# Patient Record
Sex: MALE | Race: BLACK OR AFRICAN AMERICAN | NOT HISPANIC OR LATINO | Employment: OTHER | URBAN - METROPOLITAN AREA
[De-identification: names, ages, dates, MRNs, and addresses within clinical notes are randomized per-mention and may not be internally consistent; named-entity substitution may affect disease eponyms.]

---

## 2017-03-09 ENCOUNTER — HOSPITAL ENCOUNTER (OUTPATIENT)
Facility: HOSPITAL | Age: 67
LOS: 1 days | Discharge: HOME | End: 2017-03-10
Attending: EMERGENCY MEDICINE | Admitting: HOSPITALIST
Payer: MEDICARE

## 2017-03-09 DIAGNOSIS — I25.10 CORONARY ARTERY DISEASE DUE TO LIPID RICH PLAQUE: Chronic | ICD-10-CM

## 2017-03-09 DIAGNOSIS — I10 ESSENTIAL HYPERTENSION: Chronic | ICD-10-CM

## 2017-03-09 DIAGNOSIS — R07.9 ACUTE CHEST PAIN: ICD-10-CM

## 2017-03-09 DIAGNOSIS — R07.9 CHEST PAIN: ICD-10-CM

## 2017-03-09 DIAGNOSIS — E78.5 HYPERLIPIDEMIA, UNSPECIFIED HYPERLIPIDEMIA TYPE: Chronic | ICD-10-CM

## 2017-03-09 DIAGNOSIS — R94.31 ABNORMAL EKG: ICD-10-CM

## 2017-03-09 DIAGNOSIS — D63.8 ANEMIA OF CHRONIC DISEASE: Chronic | ICD-10-CM

## 2017-03-09 DIAGNOSIS — K21.9 GASTROESOPHAGEAL REFLUX DISEASE, ESOPHAGITIS PRESENCE NOT SPECIFIED: Chronic | ICD-10-CM

## 2017-03-09 DIAGNOSIS — I25.83 CORONARY ARTERY DISEASE DUE TO LIPID RICH PLAQUE: Chronic | ICD-10-CM

## 2017-03-09 DIAGNOSIS — N17.9 ACUTE RENAL FAILURE, UNSPECIFIED ACUTE RENAL FAILURE TYPE: Primary | ICD-10-CM

## 2017-03-09 LAB
ALBUMIN SERPL BCP-MCNC: 3.3 G/DL
ALP SERPL-CCNC: 84 U/L
ALT SERPL W/O P-5'-P-CCNC: 20 U/L
ANION GAP SERPL CALC-SCNC: 10 MMOL/L
AST SERPL-CCNC: 13 U/L
BASOPHILS # BLD AUTO: 0.03 K/UL
BASOPHILS NFR BLD: 0.5 %
BILIRUB SERPL-MCNC: 0.4 MG/DL
BNP SERPL-MCNC: 103 PG/ML
BUN SERPL-MCNC: 19 MG/DL
CALCIUM SERPL-MCNC: 9.4 MG/DL
CHLORIDE SERPL-SCNC: 97 MMOL/L
CO2 SERPL-SCNC: 25 MMOL/L
CREAT SERPL-MCNC: 1.5 MG/DL
DIFFERENTIAL METHOD: ABNORMAL
EOSINOPHIL # BLD AUTO: 0.2 K/UL
EOSINOPHIL NFR BLD: 3.2 %
ERYTHROCYTE [DISTWIDTH] IN BLOOD BY AUTOMATED COUNT: 12.6 %
EST. GFR  (AFRICAN AMERICAN): 55 ML/MIN/1.73 M^2
EST. GFR  (NON AFRICAN AMERICAN): 48 ML/MIN/1.73 M^2
GLUCOSE SERPL-MCNC: 438 MG/DL
HCT VFR BLD AUTO: 32.8 %
HGB BLD-MCNC: 12 G/DL
INR PPP: 1
LYMPHOCYTES # BLD AUTO: 1.4 K/UL
LYMPHOCYTES NFR BLD: 21.4 %
MCH RBC QN AUTO: 29.1 PG
MCHC RBC AUTO-ENTMCNC: 36.6 %
MCV RBC AUTO: 79 FL
MONOCYTES # BLD AUTO: 0.6 K/UL
MONOCYTES NFR BLD: 8.4 %
NEUTROPHILS # BLD AUTO: 4.4 K/UL
NEUTROPHILS NFR BLD: 67.1 %
PLATELET # BLD AUTO: 183 K/UL
PMV BLD AUTO: 9.3 FL
POTASSIUM SERPL-SCNC: 3.5 MMOL/L
PROT SERPL-MCNC: 7.2 G/DL
PROTHROMBIN TIME: 10.1 SEC
RBC # BLD AUTO: 4.13 M/UL
SODIUM SERPL-SCNC: 132 MMOL/L
TROPONIN I SERPL DL<=0.01 NG/ML-MCNC: 0.02 NG/ML
WBC # BLD AUTO: 6.64 K/UL

## 2017-03-09 PROCEDURE — 93005 ELECTROCARDIOGRAM TRACING: CPT

## 2017-03-09 PROCEDURE — 80053 COMPREHEN METABOLIC PANEL: CPT

## 2017-03-09 PROCEDURE — 25000003 PHARM REV CODE 250: Performed by: EMERGENCY MEDICINE

## 2017-03-09 PROCEDURE — 84484 ASSAY OF TROPONIN QUANT: CPT

## 2017-03-09 PROCEDURE — G0378 HOSPITAL OBSERVATION PER HR: HCPCS

## 2017-03-09 PROCEDURE — 83880 ASSAY OF NATRIURETIC PEPTIDE: CPT

## 2017-03-09 PROCEDURE — 96361 HYDRATE IV INFUSION ADD-ON: CPT

## 2017-03-09 PROCEDURE — 99285 EMERGENCY DEPT VISIT HI MDM: CPT | Mod: 25

## 2017-03-09 PROCEDURE — 85610 PROTHROMBIN TIME: CPT

## 2017-03-09 PROCEDURE — 85025 COMPLETE CBC W/AUTO DIFF WBC: CPT

## 2017-03-09 PROCEDURE — 96360 HYDRATION IV INFUSION INIT: CPT

## 2017-03-09 PROCEDURE — 82962 GLUCOSE BLOOD TEST: CPT

## 2017-03-09 RX ORDER — ACETAMINOPHEN 500 MG
1000 TABLET ORAL
Status: COMPLETED | OUTPATIENT
Start: 2017-03-09 | End: 2017-03-09

## 2017-03-09 RX ADMIN — ACETAMINOPHEN 1000 MG: 500 TABLET ORAL at 09:03

## 2017-03-09 RX ADMIN — SODIUM CHLORIDE 1000 ML: 0.9 INJECTION, SOLUTION INTRAVENOUS at 10:03

## 2017-03-09 NOTE — IP AVS SNAPSHOT
[place] [address] [place] [address]  Phone: [phone]           Patient Discharge Instructions     Our goal is to set you up for success. This packet includes information on your condition, medications, and your home care. It will help you to care for yourself so you don't get sicker and need to go back to the hospital.     Please ask your nurse if you have any questions.        There are many details to remember when preparing to leave the hospital. Here is what you will need to do:    1. Take your medicine. If you are prescribed medications, review your Medication List in the following pages. You may have new medications to  at the pharmacy and others that you'll need to stop taking. Review the instructions for how and when to take your medications. Talk with your doctor or nurses if you are unsure of what to do.     2. Go to your follow-up appointments. Specific follow-up information is listed in the following pages. Your may be contacted by a transition nurse or clinical provider about future appointments. Be sure we have all of the phone numbers to reach you, if needed. Please contact your provider's office if you are unable to make an appointment.     3. Watch for warning signs. Your doctor or nurse will give you detailed warning signs to watch for and when to call for assistance. These instructions may also include educational information about your condition. If you experience any of warning signs to your health, call your doctor.               [facility]  Unless otherwise directed by your provider, please contact [facility], our nurse care line that is available for 24/7 assistance.     [phone] (toll-free)    Registered nurses in the [facility] provide clinical advisement, health education, appointment booking, and other advisory services.                    ** Verify the list of medication(s) below is accurate and up to date.  Carry this with you in case of emergency. If your medications have changed, please notify your healthcare provider.             Medication List      CONTINUE taking these medications        Additional Info                      amlodipine 10 MG tablet   Commonly known as:  NORVASC   Refills:  0   Dose:  10 mg    Last time this was given:  10 mg on 3/10/2017  9:46 AM   Instructions:  Take 10 mg by mouth once daily.     Begin Date    AM    Noon    PM    Bedtime       aspirin 81 MG EC tablet   Commonly known as:  ECOTRIN   Refills:  0   Dose:  81 mg    Last time this was given:  81 mg on 3/10/2017  9:47 AM   Instructions:  Take 1 tablet (81 mg total) by mouth once daily.     Begin Date    AM    Noon    PM    Bedtime       atorvastatin 80 MG tablet   Commonly known as:  LIPITOR   Refills:  0   Dose:  80 mg    Instructions:  Take 80 mg by mouth every evening.     Begin Date    AM    Noon    PM    Bedtime       carvedilol 12.5 MG tablet   Commonly known as:  COREG   Quantity:  60 tablet   Refills:  0   Dose:  12.5 mg    Last time this was given:  12.5 mg on 3/10/2017  9:47 AM   Instructions:  Take 1 tablet (12.5 mg total) by mouth 2 (two) times daily.     Begin Date    AM    Noon    PM    Bedtime       isosorbide-hydrALAZINE 20-37.5 mg 20-37.5 mg Tab   Commonly known as:  BIDIL   Quantity:  60 tablet   Refills:  0   Dose:  1 tablet    Last time this was given:  1 tablet on 3/10/2017  9:46 AM   Instructions:  Take 1 tablet by mouth 2 (two) times daily.     Begin Date    AM    Noon    PM    Bedtime       lisinopril 40 MG tablet   Commonly known as:  PRINIVIL,ZESTRIL   Refills:  0   Dose:  40 mg    Last time this was given:  40 mg on 3/10/2017  9:47 AM   Instructions:  Take 40 mg by mouth once daily.     Begin Date    AM    Noon    PM    Bedtime       metformin 1000 MG tablet   Commonly known as:  GLUCOPHAGE   Refills:  0   Dose:  1000 mg    Instructions:  Take 1,000 mg by mouth 2 (two) times daily with meals.     Begin Date  "   AM    Noon    PM    Bedtime       pantoprazole 40 MG tablet   Commonly known as:  PROTONIX   Quantity:  60 tablet   Refills:  1   Dose:  40 mg    Last time this was given:  40 mg on 3/10/2017  9:47 AM   Instructions:  Take 1 tablet (40 mg total) by mouth 2 (two) times daily.     Begin Date    AM    Noon    PM    Bedtime                  Please bring to all follow up appointments:    1. A copy of your discharge instructions.  2. All medicines you are currently taking in their original bottles.  3. Identification and insurance card.    Please arrive 15 minutes ahead of scheduled appointment time.    Please call 24 hours in advance if you must reschedule your appointment and/or time.        Follow-up Information     Follow up with [facility] On 3/14/2017.    Why:  Appointment scheduled for Tuesday March 14th at 10:50am.    Contact information:    [place] [address]  [phone]          Discharge Instructions     Future Orders    Activity as tolerated     Diet general     Questions:    Total calories:      Fat restriction, if any:      Protein restriction, if any:      Na restriction, if any:      Fluid restriction:      Additional restrictions:  Diabetic 1800    Cardiac (Low Na/Chol)        Primary Diagnosis     Your primary diagnosis was:  Acute Chest Pain      Admission Information     Date & Time Provider Department CSN    3/9/2017  9:18 PM Raoul Morse MD [facility] [id number]      Care Providers     Provider Role Specialty Primary office phone    Raoul Morse MD Attending Provider Hospitalist [phone]    Indra Foote MD Consulting Physician  Cardiology [phone]      Your Vitals Were     BP Pulse Temp Resp Height Weight    166/89 (BP Location: Right arm, Patient Position: Lying, BP Method: Automatic) 65 97.9 °F (36.6 °C) (Oral) 18 5' 11" (1.803 m) 96.3 kg (212 lb 4.9 oz)    SpO2 BMI             100% 29.61 kg/m2         Recent Lab " Values        3/7/2016                           5:37 AM           A1C 11.4 (H)                       Allergies as of 3/10/2017     No Known Allergies      Advance Directives     An advance directive is a document which, in the event you are no longer able to make decisions for yourself, tells your healthcare team what kind of treatment you do or do not want to receive, or who you would like to make those decisions for you.  If you do not currently have an advance directive, [facility] encourages you to create one.  For more information call:  [phone]), [phone] ([phone]),  or log on to [email or web address].        Language Assistance Services     ATTENTION: Language assistance services are available, free of charge. Please call [phone].      ATENCIÓN: Si habla español, tiene a schmitt disposición servicios gratuitos de asistencia lingüística. Llame al [phone].     CHÚ Ý: N?u b?n nói Ti?ng Vi?t, có các d?ch v? h? tr? ngôn ng? mi?n phí dành cho b?n. G?i s? [phone].        Diabetes Discharge Instructions                                   [facility] Sign[facility]     Activating your [facility] account is as easy as 1-2-3!     1) Visit [email or web address], select Sign Up Now, enter this activation code and your date of birth, then select Next.  [email or web address]  Expires: 4/24/2017  5:22 PM      2) Create a username and password to use when you visit [facility] in the future and select a security question in case you lose your password and select Next.    3) Enter your e-mail address and click Sign Up!    Additional Information  If you have questions, please e-mail [email or web address] or call [phone] to talk to our [facility] staff. Remember, [facility] is NOT to be used for urgent needs. For medical emergencies, dial 911.          [facility] complies with applicable Federal civil rights laws and does not discriminate on the basis of race, color, national  origin, age, disability, or sex.

## 2017-03-10 VITALS
HEIGHT: 71 IN | BODY MASS INDEX: 29.72 KG/M2 | OXYGEN SATURATION: 100 % | SYSTOLIC BLOOD PRESSURE: 160 MMHG | TEMPERATURE: 98 F | RESPIRATION RATE: 18 BRPM | DIASTOLIC BLOOD PRESSURE: 88 MMHG | HEART RATE: 65 BPM | WEIGHT: 212.31 LBS

## 2017-03-10 PROBLEM — N17.9 ACUTE RENAL FAILURE: Status: ACTIVE | Noted: 2017-03-10

## 2017-03-10 PROBLEM — D63.8 ANEMIA OF CHRONIC DISEASE: Chronic | Status: ACTIVE | Noted: 2017-03-10

## 2017-03-10 PROBLEM — E44.1 MILD PROTEIN MALNUTRITION: Chronic | Status: ACTIVE | Noted: 2017-03-10

## 2017-03-10 PROBLEM — E78.5 HYPERLIPIDEMIA: Chronic | Status: ACTIVE | Noted: 2017-03-10

## 2017-03-10 PROBLEM — K21.9 GERD (GASTROESOPHAGEAL REFLUX DISEASE): Chronic | Status: ACTIVE | Noted: 2017-03-10

## 2017-03-10 PROBLEM — I10 ESSENTIAL HYPERTENSION: Chronic | Status: ACTIVE | Noted: 2017-03-10

## 2017-03-10 LAB
ANION GAP SERPL CALC-SCNC: 11 MMOL/L
AORTIC VALVE REGURGITATION: ABNORMAL
AORTIC VALVE STENOSIS: ABNORMAL
BUN SERPL-MCNC: 16 MG/DL
CALCIUM SERPL-MCNC: 9 MG/DL
CHLORIDE SERPL-SCNC: 102 MMOL/L
CHOLEST/HDLC SERPL: 6.3 {RATIO}
CO2 SERPL-SCNC: 21 MMOL/L
CREAT SERPL-MCNC: 1.4 MG/DL
DIASTOLIC DYSFUNCTION: NO
DIASTOLIC DYSFUNCTION: YES
EST. GFR  (AFRICAN AMERICAN): >60 ML/MIN/1.73 M^2
EST. GFR  (NON AFRICAN AMERICAN): 52 ML/MIN/1.73 M^2
ESTIMATED PA SYSTOLIC PRESSURE: 15.53
GLUCOSE SERPL-MCNC: 410 MG/DL
HDL/CHOLESTEROL RATIO: 15.9 %
HDLC SERPL-MCNC: 138 MG/DL
HDLC SERPL-MCNC: 22 MG/DL
LDLC SERPL CALC-MCNC: ABNORMAL MG/DL
NONHDLC SERPL-MCNC: 116 MG/DL
POCT GLUCOSE: 312 MG/DL (ref 70–110)
POCT GLUCOSE: 320 MG/DL (ref 70–110)
POCT GLUCOSE: 348 MG/DL (ref 70–110)
POCT GLUCOSE: 349 MG/DL (ref 70–110)
POCT GLUCOSE: 350 MG/DL (ref 70–110)
POTASSIUM SERPL-SCNC: 4.2 MMOL/L
RETIRED EF AND QEF - SEE NOTES: 55 (ref 55–65)
SODIUM SERPL-SCNC: 134 MMOL/L
TRIGL SERPL-MCNC: 410 MG/DL
TROPONIN I SERPL DL<=0.01 NG/ML-MCNC: 0.01 NG/ML
TROPONIN I SERPL DL<=0.01 NG/ML-MCNC: <0.006 NG/ML

## 2017-03-10 PROCEDURE — 96368 THER/DIAG CONCURRENT INF: CPT

## 2017-03-10 PROCEDURE — 93306 TTE W/DOPPLER COMPLETE: CPT | Mod: 26,,, | Performed by: INTERNAL MEDICINE

## 2017-03-10 PROCEDURE — 27000221 HC OXYGEN, UP TO 24 HOURS

## 2017-03-10 PROCEDURE — 94761 N-INVAS EAR/PLS OXIMETRY MLT: CPT

## 2017-03-10 PROCEDURE — G0378 HOSPITAL OBSERVATION PER HR: HCPCS

## 2017-03-10 PROCEDURE — 93018 CV STRESS TEST I&R ONLY: CPT | Mod: ,,, | Performed by: INTERNAL MEDICINE

## 2017-03-10 PROCEDURE — 80048 BASIC METABOLIC PNL TOTAL CA: CPT

## 2017-03-10 PROCEDURE — 80061 LIPID PANEL: CPT

## 2017-03-10 PROCEDURE — 93306 TTE W/DOPPLER COMPLETE: CPT

## 2017-03-10 PROCEDURE — 84484 ASSAY OF TROPONIN QUANT: CPT

## 2017-03-10 PROCEDURE — 93005 ELECTROCARDIOGRAM TRACING: CPT

## 2017-03-10 PROCEDURE — 25000003 PHARM REV CODE 250: Performed by: EMERGENCY MEDICINE

## 2017-03-10 PROCEDURE — 93017 CV STRESS TEST TRACING ONLY: CPT

## 2017-03-10 PROCEDURE — 63600175 PHARM REV CODE 636 W HCPCS

## 2017-03-10 PROCEDURE — 96360 HYDRATION IV INFUSION INIT: CPT

## 2017-03-10 PROCEDURE — 36415 COLL VENOUS BLD VENIPUNCTURE: CPT

## 2017-03-10 PROCEDURE — 93016 CV STRESS TEST SUPVJ ONLY: CPT | Mod: ,,, | Performed by: INTERNAL MEDICINE

## 2017-03-10 PROCEDURE — 99219 PR INITIAL OBSERVATION CARE,LEVL II: CPT | Mod: ,,, | Performed by: INTERNAL MEDICINE

## 2017-03-10 PROCEDURE — 78452 HT MUSCLE IMAGE SPECT MULT: CPT | Mod: 26,,, | Performed by: INTERNAL MEDICINE

## 2017-03-10 PROCEDURE — 96361 HYDRATE IV INFUSION ADD-ON: CPT

## 2017-03-10 PROCEDURE — 63600175 PHARM REV CODE 636 W HCPCS: Performed by: INTERNAL MEDICINE

## 2017-03-10 PROCEDURE — 25000003 PHARM REV CODE 250: Performed by: INTERNAL MEDICINE

## 2017-03-10 RX ORDER — GLUCAGON 1 MG
1 KIT INJECTION
Status: DISCONTINUED | OUTPATIENT
Start: 2017-03-10 | End: 2017-03-10 | Stop reason: HOSPADM

## 2017-03-10 RX ORDER — AMLODIPINE BESYLATE 5 MG/1
10 TABLET ORAL DAILY
Status: DISCONTINUED | OUTPATIENT
Start: 2017-03-10 | End: 2017-03-10 | Stop reason: HOSPADM

## 2017-03-10 RX ORDER — ISOSORBIDE DINITRATE AND HYDRALAZINE HYDROCHLORIDE 37.5; 2 MG/1; MG/1
1 TABLET ORAL 2 TIMES DAILY
Status: DISCONTINUED | OUTPATIENT
Start: 2017-03-10 | End: 2017-03-10 | Stop reason: HOSPADM

## 2017-03-10 RX ORDER — CLONIDINE HYDROCHLORIDE 0.1 MG/1
0.1 TABLET ORAL 3 TIMES DAILY PRN
Status: DISCONTINUED | OUTPATIENT
Start: 2017-03-10 | End: 2017-03-10 | Stop reason: HOSPADM

## 2017-03-10 RX ORDER — ASPIRIN 81 MG/1
81 TABLET ORAL DAILY
Status: DISCONTINUED | OUTPATIENT
Start: 2017-03-10 | End: 2017-03-10 | Stop reason: HOSPADM

## 2017-03-10 RX ORDER — ONDANSETRON 2 MG/ML
4 INJECTION INTRAMUSCULAR; INTRAVENOUS EVERY 12 HOURS PRN
Status: DISCONTINUED | OUTPATIENT
Start: 2017-03-10 | End: 2017-03-10

## 2017-03-10 RX ORDER — AMOXICILLIN 250 MG
1 CAPSULE ORAL 2 TIMES DAILY
Status: DISCONTINUED | OUTPATIENT
Start: 2017-03-10 | End: 2017-03-10

## 2017-03-10 RX ORDER — LISINOPRIL 20 MG/1
40 TABLET ORAL DAILY
Status: DISCONTINUED | OUTPATIENT
Start: 2017-03-10 | End: 2017-03-10 | Stop reason: HOSPADM

## 2017-03-10 RX ORDER — IBUPROFEN 200 MG
16 TABLET ORAL
Status: DISCONTINUED | OUTPATIENT
Start: 2017-03-10 | End: 2017-03-10 | Stop reason: HOSPADM

## 2017-03-10 RX ORDER — ONDANSETRON 2 MG/ML
8 INJECTION INTRAMUSCULAR; INTRAVENOUS EVERY 8 HOURS PRN
Status: DISCONTINUED | OUTPATIENT
Start: 2017-03-10 | End: 2017-03-10 | Stop reason: HOSPADM

## 2017-03-10 RX ORDER — NITROGLYCERIN 0.4 MG/1
0.4 TABLET SUBLINGUAL EVERY 5 MIN PRN
Status: DISCONTINUED | OUTPATIENT
Start: 2017-03-10 | End: 2017-03-10 | Stop reason: HOSPADM

## 2017-03-10 RX ORDER — REGADENOSON 0.08 MG/ML
INJECTION, SOLUTION INTRAVENOUS
Status: DISCONTINUED
Start: 2017-03-10 | End: 2017-03-10 | Stop reason: HOSPADM

## 2017-03-10 RX ORDER — IBUPROFEN 200 MG
24 TABLET ORAL
Status: DISCONTINUED | OUTPATIENT
Start: 2017-03-10 | End: 2017-03-10 | Stop reason: HOSPADM

## 2017-03-10 RX ORDER — ACETAMINOPHEN 325 MG/1
650 TABLET ORAL EVERY 6 HOURS PRN
Status: DISCONTINUED | OUTPATIENT
Start: 2017-03-10 | End: 2017-03-10

## 2017-03-10 RX ORDER — ACETAMINOPHEN 500 MG
500 TABLET ORAL EVERY 6 HOURS PRN
Status: DISCONTINUED | OUTPATIENT
Start: 2017-03-10 | End: 2017-03-10 | Stop reason: HOSPADM

## 2017-03-10 RX ORDER — ENOXAPARIN SODIUM 100 MG/ML
40 INJECTION SUBCUTANEOUS EVERY 24 HOURS
Status: DISCONTINUED | OUTPATIENT
Start: 2017-03-10 | End: 2017-03-10 | Stop reason: HOSPADM

## 2017-03-10 RX ORDER — INSULIN ASPART 100 [IU]/ML
0-5 INJECTION, SOLUTION INTRAVENOUS; SUBCUTANEOUS
Status: DISCONTINUED | OUTPATIENT
Start: 2017-03-10 | End: 2017-03-10

## 2017-03-10 RX ORDER — ATORVASTATIN CALCIUM 40 MG/1
80 TABLET, FILM COATED ORAL NIGHTLY
Status: DISCONTINUED | OUTPATIENT
Start: 2017-03-10 | End: 2017-03-10 | Stop reason: HOSPADM

## 2017-03-10 RX ORDER — SODIUM CHLORIDE 9 MG/ML
INJECTION, SOLUTION INTRAVENOUS CONTINUOUS
Status: DISCONTINUED | OUTPATIENT
Start: 2017-03-10 | End: 2017-03-10 | Stop reason: HOSPADM

## 2017-03-10 RX ORDER — CARVEDILOL 6.25 MG/1
12.5 TABLET ORAL 2 TIMES DAILY
Status: DISCONTINUED | OUTPATIENT
Start: 2017-03-10 | End: 2017-03-10 | Stop reason: HOSPADM

## 2017-03-10 RX ORDER — INSULIN ASPART 100 [IU]/ML
0-5 INJECTION, SOLUTION INTRAVENOUS; SUBCUTANEOUS
Status: DISCONTINUED | OUTPATIENT
Start: 2017-03-10 | End: 2017-03-10 | Stop reason: HOSPADM

## 2017-03-10 RX ORDER — RAMELTEON 8 MG/1
8 TABLET ORAL NIGHTLY PRN
Status: DISCONTINUED | OUTPATIENT
Start: 2017-03-10 | End: 2017-03-10 | Stop reason: HOSPADM

## 2017-03-10 RX ORDER — PANTOPRAZOLE SODIUM 40 MG/1
40 TABLET, DELAYED RELEASE ORAL 2 TIMES DAILY
Status: DISCONTINUED | OUTPATIENT
Start: 2017-03-10 | End: 2017-03-10 | Stop reason: HOSPADM

## 2017-03-10 RX ORDER — MORPHINE SULFATE 10 MG/ML
4 INJECTION INTRAMUSCULAR; INTRAVENOUS; SUBCUTANEOUS EVERY 4 HOURS PRN
Status: DISCONTINUED | OUTPATIENT
Start: 2017-03-10 | End: 2017-03-10 | Stop reason: HOSPADM

## 2017-03-10 RX ADMIN — INSULIN ASPART 5 UNITS: 100 INJECTION, SOLUTION INTRAVENOUS; SUBCUTANEOUS at 05:03

## 2017-03-10 RX ADMIN — SODIUM CHLORIDE: 0.9 INJECTION, SOLUTION INTRAVENOUS at 06:03

## 2017-03-10 RX ADMIN — ASPIRIN 81 MG: 81 TABLET, COATED ORAL at 09:03

## 2017-03-10 RX ADMIN — CLONIDINE HYDROCHLORIDE 0.1 MG: 0.1 TABLET ORAL at 06:03

## 2017-03-10 RX ADMIN — PANTOPRAZOLE SODIUM 40 MG: 40 TABLET, DELAYED RELEASE ORAL at 09:03

## 2017-03-10 RX ADMIN — LISINOPRIL 40 MG: 20 TABLET ORAL at 09:03

## 2017-03-10 RX ADMIN — HYDRALAZINE HYDROCHLORIDE AND ISOSORBIDE DINITRATE 1 TABLET: 37.5; 2 TABLET, FILM COATED ORAL at 09:03

## 2017-03-10 RX ADMIN — CARVEDILOL 12.5 MG: 6.25 TABLET, FILM COATED ORAL at 09:03

## 2017-03-10 RX ADMIN — AMLODIPINE BESYLATE 10 MG: 5 TABLET ORAL at 09:03

## 2017-03-10 RX ADMIN — INSULIN ASPART 2 UNITS: 100 INJECTION, SOLUTION INTRAVENOUS; SUBCUTANEOUS at 03:03

## 2017-03-10 NOTE — ASSESSMENT & PLAN NOTE
Poorly-controlled; will continue patient's home regimen of atorvastatin.  Will repeat the lipid panel.

## 2017-03-10 NOTE — ED PROVIDER NOTES
Encounter Date: 3/9/2017    SCRIBE #1 NOTE: I, Connie Leos, am scribing for, and in the presence of,  Brian Hernandez MD. I have scribed the following portions of the note - Other sections scribed: HPI, ROS.       History   No chief complaint on file.    Review of patient's allergies indicates:  No Known Allergies  HPI Comments: CC: Chest Pain    HPI: 66 y.o. M with DM, HTN, hyperlipemia, arthritis, and CAD presents to the ED via EMS c/o intermittent, non-radiating L-sided chest pain with associated SOB, abdominal pain, and headaches x 3 days with worsening pain this evening. Symptoms are acute onset and severe (9/10). EMS administered 3 doses of nitro SL and aspirin 325 mg en route with no relief. Pt denies fever, chills, diaphoresis, nausea, vomiting, extremity pain, and any other associated symptoms. No alleviating factors.    The history is provided by the patient and the EMS personnel. No  was used.     Past Medical History:   Diagnosis Date    Arthritis     Coronary artery disease     Diabetes mellitus     Hyperlipemia     Hypertension      Past Surgical History:   Procedure Laterality Date    EYE SURGERY      VASCULAR SURGERY       Family History   Problem Relation Age of Onset    Hypertension Mother     Diabetes Mother     Diabetes Father     Hypertension Father     Diabetes Sister     Hypertension Sister      Social History   Substance Use Topics    Smoking status: Never Smoker    Smokeless tobacco: None    Alcohol use 3.6 oz/week     6 Cans of beer per week      Comment: states drinks a 6 pack of beer every other day     Review of Systems   Constitutional: Negative for chills, diaphoresis and fever.   HENT: Negative for ear pain and sore throat.    Eyes: Negative for photophobia and visual disturbance.   Respiratory: Positive for shortness of breath. Negative for cough.    Cardiovascular: Positive for chest pain (L-sided).   Gastrointestinal: Positive for abdominal  pain. Negative for diarrhea, nausea and vomiting.   Genitourinary: Negative for dysuria.   Musculoskeletal: Negative for back pain.        (-) extremity pain   Skin: Negative for rash.   Neurological: Positive for headaches.       Physical Exam   Initial Vitals   BP Pulse Resp Temp SpO2   03/09/17 2121 03/09/17 2121 -- -- 03/09/17 2121   146/75 78   96 %     Physical Exam    Nursing note and vitals reviewed.  Constitutional: He appears well-developed and well-nourished. He is not diaphoretic. No distress.   HENT:   Head: Normocephalic and atraumatic.   Right Ear: External ear normal.   Left Ear: External ear normal.   Nose: Nose normal.   Mouth/Throat: Oropharynx is clear and moist.   Eyes: Conjunctivae and EOM are normal. Pupils are equal, round, and reactive to light. Right eye exhibits no discharge. Left eye exhibits no discharge. No scleral icterus.   Neck: Normal range of motion. Neck supple. No JVD present.   Cardiovascular: Normal rate, regular rhythm, normal heart sounds and intact distal pulses. Exam reveals no gallop and no friction rub.    No murmur heard.  Pulmonary/Chest: Breath sounds normal. No stridor. No respiratory distress. He has no wheezes. He has no rhonchi. He has no rales. He exhibits no tenderness.   Abdominal: He exhibits no distension and no mass. There is no tenderness. There is no rebound and no guarding.   Musculoskeletal: Normal range of motion. He exhibits no edema or tenderness.   Neurological: He is alert and oriented to person, place, and time. He has normal strength. No cranial nerve deficit or sensory deficit.   Skin: Skin is warm and dry. No rash noted. No erythema. No pallor.   Psychiatric: He has a normal mood and affect. His behavior is normal. Judgment and thought content normal.         ED Course   Procedures  Labs Reviewed - No data to display  EKG Readings: (Independently Interpreted)   Initial Reading: No STEMI.   EKG reviewed and interpreted by me shows sinus rhythm at  a rate of 78.  There is a left bundle branch block.  There is first-degree AV block.  There is a normal QT interval.  There is a left axis deviation.  There is poor R-wave progression.  There are T-wave inversions in the lateral and inferior leads.  Findings are similar morphology to EKG from March 6, 2016.       X-Rays:   Independently Interpreted Readings:   Other Readings:  Chest x-ray reviewed and interpreted by me shows no evidence of pulmonary edema, pneumothorax, or consolidations/ infiltrates.    Medical Decision Making:   ED Management:  This is the emergent evaluation of a 66-year-old male presents the emergency department complaining of chest pain that has been intermittent for the past 3 days but suddenly became worse this evening.Differential diagnosis at the time of initial evaluation included, but was not limited to:  acute myocardial infarction, acute coronary syndrome, pericarditis, myocarditis, pneumonia, pneumothorax, gastroesophageal reflux disease, pleurisy, costochondritis.  I consider but doubt pulmonary embolus and aortic dissection.  I reviewed this patient's medical records.  History of coronary artery disease, diabetes.  Patient denies any tobacco use.    Patient has findings by history that are concerning for acute coronary syndrome.  He has multiple risk factors.  EKG is abnormal but unchanged from prior.  This patient needs admission to telemetry for further evaluation and management by cardiology.  Serial troponins.  Patient's chest pain-free at this time.  I believe he is safe and stable for admission.  Case discussed with the admitting hospitalist, Dr. Flores, who agrees with plan.            Scribe Attestation:   Scribe #1: I performed the above scribed service and the documentation accurately describes the services I performed. I attest to the accuracy of the note.    Attending Attestation:           Physician Attestation for Scribe:  Physician Attestation Statement for Scribe #1:  I, Brian Hernandez MD, reviewed documentation, as scribed by Connie Leos in my presence, and it is both accurate and complete.                 ED Course     Clinical Impression:   The encounter diagnosis was Chest pain.          Brian Hernandez Jr., MD  03/10/17 0144

## 2017-03-10 NOTE — ASSESSMENT & PLAN NOTE
Poorly-controlled on a home regimen of metformin; will provide basal insulin along with insulin sliding scale.

## 2017-03-10 NOTE — PROGRESS NOTES
Follow-up Information     Follow up with [facility] On 3/14/2017.    Why:  Appointment scheduled for Tuesday March 14th at 10:50am.    Contact information:    [place] [id number]  [phone]        Thank you for choosing [facility] for your care. Within 48-72 hours after leaving the hospital you will receive a call from [facility] Nurses following up to see how you are doing. The team will ask you a few questions and the call will last approximately 20 minutes.     Please answer any calls you may receive from [facility] we want to continue to support you as you manage your healthcare needs. [facility] happy to have the opportunity to serve you.     [facility] Nurse Care Line - 24/7 Assistance  Registered [facility] nurses can provide appointment booking, health education, clinical advisement, and other advisory services.   Call for this free service at [phone].              Sincerely,   Your [facility],   Corine Goldberg,JOYCE/[place]  [phone]

## 2017-03-10 NOTE — ASSESSMENT & PLAN NOTE
Patient's blood pressure is poorly-controlled; will continue home regimen of amlodipine, carvedilol, hydralazine, isosorbide, and lisinopril, and provide as-needed clonidine.

## 2017-03-10 NOTE — DISCHARGE SUMMARY
[facility] Hospital Medicine  Discharge Summary      Patient Name: Chato Bowie  MRN: [id number]  Admission Date: 3/9/2017  Hospital Length of Stay: 1 days  Discharge Date and Time:  03/10/2017 4:53 PM  Attending Physician: Raoul Morse MD   Discharging Provider: Radha Ye PA-C  Primary Care Provider: [facility]      HPI:   Mr. Chato Bowie is a 66 y.o. male with essential hypertension, hyperlipidemia (LDL inval. Feb 2010), type 2 diabetes mellitus (HbA1c 11.4% Mar 2016), anemia of chronic disease, mild protein malnutrition, and GERD who presents to [facility] ED with complaints of chest pain for three days.  The pain is left-sided with radiation to the left shoulder, is both heavy and stabbing in quality, and was 10/10 in severity.  The pain started at rest while he was watching television and was associated with nausea without vomiting, shortness of breath, and weakness, but not with diaphoresis, palpitations, fevers, chills, hemoptysis, nor any lower extremity pain or swelling.  He cannot recall any exacerbating factors but does say that the sublingual nitroglycerin he received helps with pain.  He reports that both his mother and father had heart attacks and diabetes, but he himself never had a heart attack.  He does say that he usually goes to [facility] for these pains, which has been ongoing for three years, and he has undergone coronary angiograms but never had a stent.      * No surgery found *      Indwelling Lines/Drains at time of discharge:   Lines/Drains/Airways          No matching active lines, drains, or airways        Hospital Course:   Placed in observation for ACS r/o. Patient with no chest pain by time of interview. CHARITY score of 3 for age, risk factors, and recent aspirin use.  Place in observation on telemetry. EKG and it reveals normal sinus rhythm with a LBBB that appears to be new; there are anterolateral T-wave inversions  which appears to be old. Chest X-ray is without any obvious chest pain mimickers. Administered supplemental oxygen and aspirin along with as-needed nitroglycerin and morphine.  Cardiology consulted with recommendations for stress and echo. Obtained serial cardiac markers all negative. Echo revealed EF of 55% and DD. Stress revealing abnormal perfusion with moderate fixed defect L ventricle wall but unchanged from previous in March 2016. Patient BP poorly controlled but reports had been out of home medications for about 1 week. Renal function improved with IVF hydration. Of note patient with elevated BG on presentation. On metformin at home but again states has been out for about a week. Concerned he may need addition of basal insulin, however, per patient when on metformin prior to running out BG running in one teens to 150. He states he will go to  refills right after discharge and keep BG log to bring to PCP appointment to determine need for further titration. He remained stable throughout stay and will discharge to home today.      Consults:   Consults         Status Ordering Provider     Inpatient consult to Cardiology  Once     Provider:  Indra Foote MD    Acknowledged BLAS PATHAK JR          Significant Diagnostic Studies: Labs:   CMP   Recent Labs  Lab 03/09/17 2138 03/10/17  1457   * 134*   K 3.5 4.2   CL 97 102   CO2 25 21*   * 410*   BUN 19 16   CREATININE 1.5* 1.4   CALCIUM 9.4 9.0   PROT 7.2  --    ALBUMIN 3.3*  --    BILITOT 0.4  --    ALKPHOS 84  --    AST 13  --    ALT 20  --    ANIONGAP 10 11   ESTGFRAFRICA 55* >60   EGFRNONAA 48* 52*   , CBC   Recent Labs  Lab 03/09/17 2138   WBC 6.64   HGB 12.0*   HCT 32.8*       and Troponin   Recent Labs  Lab 03/10/17  1457   TROPONINI <0.006     NST: ABNORMAL MYOCARDIAL PERFUSION  1. The perfusion scan is free of evidence for myocardial ischemia.   2. There is mild intensity fixed defect in the inferior wall of the  left ventricle, consistent with myocardial injury.   3. Resting wall motion is physiologic.   4. There is resting LV dysfunction with a reduced ejection fraction of 44 %.   5. The ventricular volumes are normal at rest and stress.   6. The extracardiac distribution of radioactivity is normal.   7. When compared to the previous study from 03/08/2016, no change    Cardiac Graphics: Echocardiogram:   2D echo with color flow doppler:   Results for orders placed or performed during the hospital encounter of 03/09/17   2D echo with color flow doppler   Result Value Ref Range    EF 55 55 - 65    Diastolic Dysfunction Yes (A)     Aortic Valve Regurgitation MILD     Aortic Valve Stenosis MILD (A)     Est. PA Systolic Pressure 15.53        Pending Diagnostic Studies:     Procedure Component Value Units Date/Time    EKG 12-LEAD [[id number]]     Order Status:  Sent Lab Status:  No result     EKG 12-LEAD [[id number]]     Order Status:  Sent Lab Status:  No result     EKG 12-LEAD [[id number]]     Order Status:  Sent Lab Status:  No result     NM Myocardial Perfusion Spect Multi Pharmacologic [[id number]] Resulted:  03/10/17 1054    Order Status:  Sent Lab Status:  In process Updated:  03/10/17 1339        Final Active Diagnoses:    Diagnosis Date Noted POA    PRINCIPAL PROBLEM:  Acute chest pain [R07.9] 03/08/2016 Yes    Acute renal failure [N17.9] 03/10/2017 Yes    Essential hypertension [I10] 03/10/2017 Yes     Chronic    Hyperlipidemia [E78.5] 03/10/2017 Yes     Chronic    Type 2 diabetes mellitus, uncontrolled [E11.65] 03/10/2017 Yes     Chronic    Anemia of chronic disease [D63.8] 03/10/2017 Yes     Chronic    Mild protein malnutrition [E44.1] 03/10/2017 Yes     Chronic    GERD (gastroesophageal reflux disease) [K21.9] 03/10/2017 Yes     Chronic      Problems Resolved During this Admission:    Diagnosis Date Noted Date Resolved POA      No new Assessment & Plan notes have been filed under this hospital service since the  last note was generated.  Service: Hospital Medicine      Discharged Condition: stable    Disposition:     Follow Up:  Follow-up Information     Follow up with [facility] On 3/14/2017.    Why:  Appointment scheduled for Tuesday March 14th at 10:50am.    Contact information:    [place] [id number]  [phone]          Patient Instructions:   No discharge procedures on file.  Medications:  Reconciled Home Medications:   Current Discharge Medication List      CONTINUE these medications which have NOT CHANGED    Details   amlodipine (NORVASC) 10 MG tablet Take 10 mg by mouth once daily.      aspirin (ECOTRIN) 81 MG EC tablet Take 1 tablet (81 mg total) by mouth once daily.  Refills: 0      atorvastatin (LIPITOR) 80 MG tablet Take 80 mg by mouth every evening.      carvedilol (COREG) 12.5 MG tablet Take 1 tablet (12.5 mg total) by mouth 2 (two) times daily.  Qty: 60 tablet, Refills: 0      isosorbide-hydrALAZINE 20-37.5 mg (BIDIL) 20-37.5 mg Tab Take 1 tablet by mouth 2 (two) times daily.  Qty: 60 tablet, Refills: 0      lisinopril (PRINIVIL,ZESTRIL) 40 MG tablet Take 40 mg by mouth once daily.      metformin (GLUCOPHAGE) 1000 MG tablet Take 1,000 mg by mouth 2 (two) times daily with meals.      pantoprazole (PROTONIX) 40 MG tablet Take 1 tablet (40 mg total) by mouth 2 (two) times daily.  Qty: 60 tablet, Refills: 1           Time spent on the discharge of patient: less than thirty minutes    Radha Ye PA-C  Department of Hospital Medicine  [facility]

## 2017-03-10 NOTE — PLAN OF CARE
03/10/17 1559   Discharge Assessment   Assessment Type Discharge Planning Assessment   Confirmed/corrected address and phone number on facesheet? Yes   Assessment information obtained from? Patient   Prior to hospitilization cognitive status: Alert/Oriented   Prior to hospitalization functional status: Independent   Current cognitive status: Alert/Oriented   Current Functional Status: Independent   Arrived From home or self-care   Lives With alone   Able to Return to Prior Arrangements yes   Is patient able to care for self after discharge? Yes   How many people do you have in your home that can help with your care after discharge? 1   Who are your caregiver(s) and their phone number(s)? Dulce cole [phone]   Patient's perception of discharge disposition home or selfcare   Readmission Within The Last 30 Days no previous admission in last 30 days   Patient currently being followed by outpatient case management? No   Patient currently receives home health services? No   Does the patient currently use HME? Yes   Equipment Currently Used at Home glucometer   Do you have any problems affording any of your prescribed medications? No   Is the patient taking medications as prescribed? yes   Do you have any financial concerns preventing you from receiving the healthcare you need? No   Does the patient have transportation to healthcare appointments? Yes   Transportation Available family or friend will provide;taxi   On Dialysis? No   Does the patient receive services at the Coumadin Clinic? No   Are there any open cases? No   Discharge Plan A Home     [facility] [address] [place] - [address]  [address]  [facility] [address]  Phone: [phone] Fax: [phone]    [facility] for Tuesday March [facility]th at 10:50.  Information entered into follow up tab to print on AVS

## 2017-03-10 NOTE — ASSESSMENT & PLAN NOTE
The chronicity of his renal failure is unclear at this time; the last labwork we have for him is a year ago.  Patient's urinalysis is pending.  Urine output has been stable.  Will obtain additional urine studies; provide aggressive IV fluid hydration; monitor the urine output; recheck the renal function in the morning; and avoid nephrotoxins.

## 2017-03-10 NOTE — H&P
[facility] Medicine  History & Physical    Patient Name: Chato Bowie  MRN: [id number]  Admission Date: 3/9/2017  Attending Physician: Raoul Morse MD   Primary Care Provider: [facility]         Patient information was obtained from patient.     Subjective:     Principal Problem:Acute chest pain    Chief Complaint: Chest pain last night.    HPI: Mr. Chato Bowie is a 66 y.o. male with essential hypertension, hyperlipidemia (LDL inval. Feb 2010), type 2 diabetes mellitus (HbA1c 11.4% Mar 2016), anemia of chronic disease, mild protein malnutrition, and GERD who presents to [facility] ED with complaints of chest pain for since last night.  The pain is left-sided with radiation to the left shoulder, is both heavy and stabbing in quality, and was 10/10 in severity.  The pain started at rest while he was watching television and was associated with nausea without vomiting, shortness of breath, and weakness, but not with diaphoresis, palpitations, fevers, chills, hemoptysis, nor any lower extremity pain or swelling.  He cannot recall any exacerbating factors but does say that the sublingual nitroglycerin he received helps with pain.  He reports that both his mother and father had heart attacks and diabetes, but he himself never had a heart attack.  He does say that he usually goes to [facility] for these pains, which has been ongoing for three years, and he has undergone coronary angiograms but never had a stent.      Chart Review:  Previous Hospitalizations  Date Hospital Diagnosis   Mar 2016 [facility] Abdominal pain     Past Medical History:   Diagnosis Date    Arthritis     Coronary artery disease     Diabetes mellitus     Hyperlipemia     Hypertension        Past Surgical History:   Procedure Laterality Date    EYE SURGERY      VASCULAR SURGERY         Review of patient's allergies indicates:  No Known Allergies    No current facility-administered  medications on file prior to encounter.      Current Outpatient Prescriptions on File Prior to Encounter   Medication Sig    amlodipine (NORVASC) 10 MG tablet Take 10 mg by mouth once daily.    aspirin (ECOTRIN) 81 MG EC tablet Take 1 tablet (81 mg total) by mouth once daily.    atorvastatin (LIPITOR) 80 MG tablet Take 80 mg by mouth every evening.    carvedilol (COREG) 12.5 MG tablet Take 1 tablet (12.5 mg total) by mouth 2 (two) times daily.    isosorbide-hydrALAZINE 20-37.5 mg (BIDIL) 20-37.5 mg Tab Take 1 tablet by mouth 2 (two) times daily.    lisinopril (PRINIVIL,ZESTRIL) 40 MG tablet Take 40 mg by mouth once daily.    metformin (GLUCOPHAGE) 1000 MG tablet Take 1,000 mg by mouth 2 (two) times daily with meals.    pantoprazole (PROTONIX) 40 MG tablet Take 1 tablet (40 mg total) by mouth 2 (two) times daily.     Family History     Problem Relation (Age of Onset)    Diabetes Mother, Father, Sister    Hypertension Mother, Father, Sister        Social History Main Topics    Smoking status: Never Smoker    Smokeless tobacco: Not on file    Alcohol use 3.6 oz/week     6 Cans of beer per week      Comment: states drinks a 6 pack of beer every other day    Drug use: No    Sexual activity: Not on file     Review of Systems   Constitutional: Negative for activity change, appetite change, chills, diaphoresis, fatigue, fever and unexpected weight change.   HENT: Negative.    Eyes: Negative.    Respiratory: Positive for shortness of breath. Negative for cough, chest tightness and wheezing.    Cardiovascular: Positive for chest pain. Negative for palpitations and leg swelling.   Gastrointestinal: Positive for nausea. Negative for abdominal distention, abdominal pain, blood in stool, constipation, diarrhea and vomiting.   Endocrine: Negative.    Genitourinary: Negative for dysuria and hematuria.   Musculoskeletal: Negative.    Neurological: Positive for weakness. Negative for dizziness, seizures, syncope and  light-headedness.   Psychiatric/Behavioral: Negative.      Objective:     Vital Signs (Most Recent):  Temp: 98 °F (36.7 °C) (03/10/17 0549)  Pulse: (!) 49 (03/10/17 0549)  Resp: 19 (03/10/17 0549)  BP: (!) 170/88 (03/10/17 0549)  SpO2: 100 % (03/10/17 0549) Vital Signs (24h Range):  Temp:  [97.8 °F (36.6 °C)-98 °F (36.7 °C)] 98 °F (36.7 °C)  Pulse:  [49-78] 49  Resp:  [16-19] 19  SpO2:  [96 %-100 %] 100 %  BP: (146-181)/(75-91) 170/88     Weight: 96.3 kg (212 lb 4.9 oz)  Body mass index is 29.61 kg/(m^2).    Physical Exam   Constitutional: He is oriented to person, place, and time. He appears well-developed and well-nourished. No distress.   HENT:   Head: Normocephalic and atraumatic.   Right Ear: External ear normal.   Left Ear: External ear normal.   Nose: Nose normal.   Eyes: Right eye exhibits no discharge. Left eye exhibits no discharge.   Neck: Normal range of motion.   Cardiovascular:   Grade II/VI early systolic murmur, normal S1 & S2   Pulmonary/Chest:   Normal effort with bibasilar fine crackles   Abdominal: Soft. Bowel sounds are normal. He exhibits no distension. There is no tenderness. There is no rebound and no guarding.   Musculoskeletal: Normal range of motion. He exhibits no edema.   Neurological: He is alert and oriented to person, place, and time.   Skin: Skin is warm and dry. He is not diaphoretic. No erythema.   Psychiatric: He has a normal mood and affect. His behavior is normal. Judgment and thought content normal.        Significant Labs: All pertinent labs within the past 24 hours have been reviewed.    Significant Imaging: I have reviewed and interpreted all pertinent imaging results/findings within the past 24 hours.    Assessment/Plan:     * Acute chest pain  Patient currently with no chest pain.  CHARITY score of 3 for age, risk factors, and recent aspirin use.  Will plan to obtain serial cardiac markers, which thus far have been negative.  Place in observation on telemetry.  I have  reviewed the EKG and it reveals normal sinus rhythm with a LBBB that appears to be new; there are anterolateral T-wave inversions which appears to be old.  Chest X-ray is without any obvious chest pain mimickers.  Will also administer supplemental oxygen and aspirin.  Will have as-needed nitroglycerin and morphine available.  Will consult Cardiology for further recommendations.    Acute renal failure  The chronicity of his renal failure is unclear at this time; the last labwork we have for him is a year ago.  Patient's urinalysis is pending.  Urine output has been stable.  Will obtain additional urine studies; provide aggressive IV fluid hydration; monitor the urine output; recheck the renal function in the morning; and avoid nephrotoxins.    Essential hypertension  Patient's blood pressure is poorly-controlled; will continue home regimen of amlodipine, carvedilol, hydralazine, isosorbide, and lisinopril, and provide as-needed clonidine.    Hyperlipidemia  Poorly-controlled; will continue patient's home regimen of atorvastatin.  Will repeat the lipid panel.    Type 2 diabetes mellitus, uncontrolled  Poorly-controlled on a home regimen of metformin; will provide basal insulin along with insulin sliding scale.    Anemia of chronic disease  The patient's H/H is stable and consistent with previous laboratory measurements, and the patient exhibits no signs or symptoms of acute bleeding; there is no indication for transfusion.  Will continue to monitor.    Mild protein malnutrition  Will provide protein supplementation with Boost Glucose.    GERD (gastroesophageal reflux disease)  Will continue his home regimen of pantoprazole.    VTE Risk Mitigation         Ordered     enoxaparin injection 40 mg  Daily     Route:  Subcutaneous        03/10/17 0104     Medium Risk of VTE  Once      03/10/17 0542            Total time spent on case: 45 minutes.        Rema Flores M.D.  Staff Nocturnist  Department of [facility]  Medicine  [facility]  Pager: [phone]

## 2017-03-10 NOTE — CONSULTS
[facility]  Cardiology  Consult Note    Patient Name: Chato Bowie  MRN: [id number]  Admission Date: 3/9/2017  Hospital Length of Stay: 1 days  Code Status: Full Code   Attending Provider: Raoul Morse MD   Consulting Provider: Tyrone Steen MD  Primary Care Physician: [facility]  Principal Problem:Acute chest pain    Patient information was obtained from patient and ER records.     Consults  Subjective:     Chief Complaint:  CP     HPI:     HPI: Mr. Chato Bowie is a 66 y.o. male with essential hypertension, hyperlipidemia (LDL inval. Feb 2010), type 2 diabetes mellitus (HbA1c 11.4% Mar 2016), anemia of chronic disease, mild protein malnutrition, and GERD who presents to [facility] ED with complaints of chest pain for since last night. The pain is left-sided with radiation to the left shoulder, is both heavy and stabbing in quality, and was 10/10 in severity. The pain started at rest while he was watching television and was associated with nausea without vomiting, shortness of breath, and weakness, but not with diaphoresis, palpitations, fevers, chills, hemoptysis, nor any lower extremity pain or swelling. He cannot recall any exacerbating factors but does say that the sublingual nitroglycerin he received helps with pain. He reports that both his mother and father had heart attacks and diabetes, but he himself never had a heart attack. He does say that he usually goes to [facility] for these pains, which has been ongoing for three years, and he has undergone coronary angiograms but never had a stent.     EKG NSR LVH with QRS widening - diffuse STT changes - similar to previous EKG    Evaluated by us during 3/2016 admission    Echo 3/7/16    1 - Mildly depressed left ventricular systolic function (EF 45-50%).  Mild global hypokinesis without regional wall motion abnormalities.     2 - Concentric hypertrophy.     3 - Left ventricular diastolic  dysfunction.     4 - Mild aortic stenosis, BASIL = 1.63 cm2, peak velocity = 2.3 m/s, mean gradient = 14.0 mmHg.     5 - Mild aortic regurgitation.     6 - Trivial to mild mitral regurgitation.     7 - Trivial tricuspid regurgitation.     8 - The estimated PA systolic pressure is 25 mmHg.     Stress test 3/8/16  Impression: ABNORMAL MYOCARDIAL PERFUSION  1. The perfusion scan is free of evidence for myocardial ischemia.   2. There is a moderate size fixed defect of moderate intensity that extends from the  to the  inferior wall of the left ventricle, consistent with myocardial injury.   3. There is abnormal wall motion at rest showing hypokinesis of the inferior wall of the left ventricle.   4. There is resting LV dysfunction with a reduced ejection fraction of 41 %.     # Abd pain, ?gastroparesis. Improved vs yesterday. GI following. EGD yesterday essentially neg.  # CP, likely referred from abd. MPI neg for ischemia.  # CAD, h/o PCI several yrs ago  # Mild isch CMP, EF 40-45%  # Abnl EKG  # NSVT  # Murmur, mild AS  # DM  # HTN, uncontrolled  # Hyperlipidemia/hypertriglyceridemia    Past Medical History:   Diagnosis Date    Arthritis     Coronary artery disease     Diabetes mellitus     Hyperlipemia     Hypertension        Past Surgical History:   Procedure Laterality Date    EYE SURGERY      VASCULAR SURGERY         Review of patient's allergies indicates:  No Known Allergies    No current facility-administered medications on file prior to encounter.      Current Outpatient Prescriptions on File Prior to Encounter   Medication Sig    amlodipine (NORVASC) 10 MG tablet Take 10 mg by mouth once daily.    aspirin (ECOTRIN) 81 MG EC tablet Take 1 tablet (81 mg total) by mouth once daily.    atorvastatin (LIPITOR) 80 MG tablet Take 80 mg by mouth every evening.    carvedilol (COREG) 12.5 MG tablet Take 1 tablet (12.5 mg total) by mouth 2 (two) times daily.    isosorbide-hydrALAZINE 20-37.5 mg (BIDIL) 20-37.5  mg Tab Take 1 tablet by mouth 2 (two) times daily.    lisinopril (PRINIVIL,ZESTRIL) 40 MG tablet Take 40 mg by mouth once daily.    metformin (GLUCOPHAGE) 1000 MG tablet Take 1,000 mg by mouth 2 (two) times daily with meals.    pantoprazole (PROTONIX) 40 MG tablet Take 1 tablet (40 mg total) by mouth 2 (two) times daily.     Family History     Problem Relation (Age of Onset)    Diabetes Mother, Father, Sister    Hypertension Mother, Father, Sister        Social History Main Topics    Smoking status: Never Smoker    Smokeless tobacco: Not on file    Alcohol use 3.6 oz/week     6 Cans of beer per week      Comment: states drinks a 6 pack of beer every other day    Drug use: No    Sexual activity: Not on file     Review of Systems   All other systems reviewed and are negative.    Objective:     Vital Signs (Most Recent):  Temp: 97.8 °F (36.6 °C) (03/10/17 0817)  Pulse: (!) 53 (03/10/17 0817)  Resp: 18 (03/10/17 0817)  BP: (!) 166/89 (03/10/17 0817)  SpO2: 99 % (03/10/17 0832) Vital Signs (24h Range):  Temp:  [97.8 °F (36.6 °C)-98 °F (36.7 °C)] 97.8 °F (36.6 °C)  Pulse:  [49-78] 53  Resp:  [16-19] 18  SpO2:  [96 %-100 %] 99 %  BP: (146-181)/(75-91) 166/89     Weight: 96.3 kg (212 lb 4.9 oz)  Body mass index is 29.61 kg/(m^2).    SpO2: 99 %  O2 Device (Oxygen Therapy): nasal cannula      Intake/Output Summary (Last 24 hours) at 03/10/17 1016  Last data filed at 03/10/17 0555   Gross per 24 hour   Intake               60 ml   Output             1450 ml   Net            -1390 ml       Lines/Drains/Airways     Peripheral Intravenous Line                 Peripheral IV - Single Lumen 03/09/17 2107 Left;Anterior less than 1 day         Peripheral IV - Single Lumen 03/09/17 2107 Right Hand less than 1 day                Physical Exam   Constitutional: He is oriented to person, place, and time. He appears well-developed and well-nourished.   HENT:   Head: Normocephalic and atraumatic.   Eyes: Conjunctivae are normal.  Pupils are equal, round, and reactive to light.   Neck: Normal range of motion. Neck supple.   Cardiovascular: Normal rate and intact distal pulses.    Murmur heard.   Harsh midsystolic murmur is present with a grade of 2/6  at the upper right sternal border radiating to the neck  Pulmonary/Chest: Effort normal and breath sounds normal.   Abdominal: Soft. Bowel sounds are normal.   Musculoskeletal: Normal range of motion.   Neurological: He is alert and oriented to person, place, and time.   Skin: Skin is warm and dry.       Significant Labs: All pertinent lab results from the last 24 hours have been reviewed.    Significant Imaging: Echocardiogram:   2D echo with color flow doppler:   Results for orders placed or performed during the hospital encounter of 03/06/16   2D echo with color flow doppler   Result Value Ref Range    EF 45 55 - 65    Mitral Valve Regurgitation TRIVIAL TO MILD     Diastolic Dysfunction Yes (A)     Aortic Valve Regurgitation MILD     Aortic Valve Stenosis MILD (A)     Est. PA Systolic Pressure 24.97     Pericardial Effusion NONE     Mitral Valve Mobility NORMAL     Tricuspid Valve Regurgitation TRIVIAL      Assessment and Plan:     Active Diagnoses:    Diagnosis Date Noted POA    PRINCIPAL PROBLEM:  Acute chest pain [R07.9] 03/08/2016 Yes    Acute renal failure [N17.9] 03/10/2017 Yes    Essential hypertension [I10] 03/10/2017 Yes     Chronic    Hyperlipidemia [E78.5] 03/10/2017 Yes     Chronic    Type 2 diabetes mellitus, uncontrolled [E11.65] 03/10/2017 Yes     Chronic    Anemia of chronic disease [D63.8] 03/10/2017 Yes     Chronic    Mild protein malnutrition [E44.1] 03/10/2017 Yes     Chronic    GERD (gastroesophageal reflux disease) [K21.9] 03/10/2017 Yes     Chronic      Problems Resolved During this Admission:    Diagnosis Date Noted Date Resolved POA       VTE Risk Mitigation         Ordered     enoxaparin injection 40 mg  Daily     Route:  Subcutaneous        03/10/17 0104      Medium Risk of VTE  Once      03/10/17 0542        CP - ruling out for MI. EKG changes are old. Reports medical Rx for CAD by previous LHC at [facility]. High risk for LHC with ARF. Check echo and stress test today  HTN  HLD  DM  Mild AS  ARF    Thank you for your consult. I will follow-up with patient. Please contact us if you have any additional questions.    Tyrone Steen MD  Cardiology   [facility]

## 2017-03-10 NOTE — ASSESSMENT & PLAN NOTE
The patient's H/H is stable and consistent with previous laboratory measurements, and the patient exhibits no signs or symptoms of acute bleeding; there is no indication for transfusion.  Will continue to monitor.

## 2017-03-10 NOTE — ED TRIAGE NOTES
Pt presents to ED by ems c/o chest pains since yesterday.  Also c/o nv and sob.  EMS called a stemi activation.  Pt received 3 doses of nitro SL and a full aspirin en route.  Rates his pain a 9/10.  Denies ha

## 2017-03-10 NOTE — ASSESSMENT & PLAN NOTE
Patient currently with no chest pain.  CHARITY score of 3 for age, risk factors, and recent aspirin use.  Will plan to obtain serial cardiac markers, which thus far have been negative.  Place in observation on telemetry.  I have reviewed the EKG and it reveals normal sinus rhythm with a LBBB that appears to be new; there are anterolateral T-wave inversions which appears to be old.  Chest X-ray is without any obvious chest pain mimickers.  Will also administer supplemental oxygen and aspirin.  Will have as-needed nitroglycerin and morphine available.  Will consult Cardiology for further recommendations.

## 2017-03-10 NOTE — PLAN OF CARE
03/10/17 1350   Discharge Assessment   Assessment Type Discharge Planning Assessment  (attempted to meet with pt to complete d/c planning assessment .  unable to do so at this time as pt is off unit for testing.)

## 2017-03-10 NOTE — SUBJECTIVE & OBJECTIVE
Past Medical History:   Diagnosis Date    Arthritis     Coronary artery disease     Diabetes mellitus     Hyperlipemia     Hypertension        Past Surgical History:   Procedure Laterality Date    EYE SURGERY      VASCULAR SURGERY         Review of patient's allergies indicates:  No Known Allergies    No current facility-administered medications on file prior to encounter.      Current Outpatient Prescriptions on File Prior to Encounter   Medication Sig    amlodipine (NORVASC) 10 MG tablet Take 10 mg by mouth once daily.    aspirin (ECOTRIN) 81 MG EC tablet Take 1 tablet (81 mg total) by mouth once daily.    atorvastatin (LIPITOR) 80 MG tablet Take 80 mg by mouth every evening.    carvedilol (COREG) 12.5 MG tablet Take 1 tablet (12.5 mg total) by mouth 2 (two) times daily.    isosorbide-hydrALAZINE 20-37.5 mg (BIDIL) 20-37.5 mg Tab Take 1 tablet by mouth 2 (two) times daily.    lisinopril (PRINIVIL,ZESTRIL) 40 MG tablet Take 40 mg by mouth once daily.    metformin (GLUCOPHAGE) 1000 MG tablet Take 1,000 mg by mouth 2 (two) times daily with meals.    pantoprazole (PROTONIX) 40 MG tablet Take 1 tablet (40 mg total) by mouth 2 (two) times daily.     Family History     Problem Relation (Age of Onset)    Diabetes Mother, Father, Sister    Hypertension Mother, Father, Sister        Social History Main Topics    Smoking status: Never Smoker    Smokeless tobacco: Not on file    Alcohol use 3.6 oz/week     6 Cans of beer per week      Comment: states drinks a 6 pack of beer every other day    Drug use: No    Sexual activity: Not on file     Review of Systems   Constitutional: Negative for activity change, appetite change, chills, diaphoresis, fatigue, fever and unexpected weight change.   HENT: Negative.    Eyes: Negative.    Respiratory: Positive for shortness of breath. Negative for cough, chest tightness and wheezing.    Cardiovascular: Positive for chest pain. Negative for palpitations and leg  swelling.   Gastrointestinal: Positive for nausea. Negative for abdominal distention, abdominal pain, blood in stool, constipation, diarrhea and vomiting.   Endocrine: Negative.    Genitourinary: Negative for dysuria and hematuria.   Musculoskeletal: Negative.    Neurological: Positive for weakness. Negative for dizziness, seizures, syncope and light-headedness.   Psychiatric/Behavioral: Negative.      Objective:     Vital Signs (Most Recent):  Temp: 98 °F (36.7 °C) (03/10/17 0549)  Pulse: (!) 49 (03/10/17 0549)  Resp: 19 (03/10/17 0549)  BP: (!) 170/88 (03/10/17 0549)  SpO2: 100 % (03/10/17 0549) Vital Signs (24h Range):  Temp:  [97.8 °F (36.6 °C)-98 °F (36.7 °C)] 98 °F (36.7 °C)  Pulse:  [49-78] 49  Resp:  [16-19] 19  SpO2:  [96 %-100 %] 100 %  BP: (146-181)/(75-91) 170/88     Weight: 96.3 kg (212 lb 4.9 oz)  Body mass index is 29.61 kg/(m^2).    Physical Exam   Constitutional: He is oriented to person, place, and time. He appears well-developed and well-nourished. No distress.   HENT:   Head: Normocephalic and atraumatic.   Right Ear: External ear normal.   Left Ear: External ear normal.   Nose: Nose normal.   Eyes: Right eye exhibits no discharge. Left eye exhibits no discharge.   Neck: Normal range of motion.   Cardiovascular:   Grade II/VI early systolic murmur, normal S1 & S2   Pulmonary/Chest:   Normal effort with bibasilar fine crackles   Abdominal: Soft. Bowel sounds are normal. He exhibits no distension. There is no tenderness. There is no rebound and no guarding.   Musculoskeletal: Normal range of motion. He exhibits no edema.   Neurological: He is alert and oriented to person, place, and time.   Skin: Skin is warm and dry. He is not diaphoretic. No erythema.   Psychiatric: He has a normal mood and affect. His behavior is normal. Judgment and thought content normal.        Significant Labs: All pertinent labs within the past 24 hours have been reviewed.    Significant Imaging: I have reviewed and  interpreted all pertinent imaging results/findings within the past 24 hours.

## 2017-10-03 ENCOUNTER — HOSPITAL ENCOUNTER (EMERGENCY)
Facility: HOSPITAL | Age: 67
Discharge: HOME | End: 2017-10-04
Attending: EMERGENCY MEDICINE
Payer: MEDICARE

## 2017-10-03 DIAGNOSIS — N30.90 CYSTITIS: Primary | ICD-10-CM

## 2017-10-03 DIAGNOSIS — R10.9 ABDOMINAL PAIN: ICD-10-CM

## 2017-10-03 LAB
ALBUMIN SERPL BCP-MCNC: 3.8 G/DL
ALP SERPL-CCNC: 87 U/L
ALT SERPL W/O P-5'-P-CCNC: 52 U/L
ANION GAP SERPL CALC-SCNC: 16 MMOL/L
AST SERPL-CCNC: 44 U/L
BACTERIA #/AREA URNS HPF: ABNORMAL /HPF
BASOPHILS # BLD AUTO: 0.14 K/UL
BASOPHILS NFR BLD: 2.6 %
BILIRUB SERPL-MCNC: 0.9 MG/DL
BILIRUB UR QL STRIP: NEGATIVE
BNP SERPL-MCNC: 81 PG/ML
BUN SERPL-MCNC: 20 MG/DL
CALCIUM SERPL-MCNC: 9.6 MG/DL
CHLORIDE SERPL-SCNC: 102 MMOL/L
CLARITY UR: CLEAR
CO2 SERPL-SCNC: 26 MMOL/L
COLOR UR: YELLOW
CREAT SERPL-MCNC: 1.2 MG/DL
DIFFERENTIAL METHOD: ABNORMAL
EOSINOPHIL # BLD AUTO: 0.1 K/UL
EOSINOPHIL NFR BLD: 1.9 %
ERYTHROCYTE [DISTWIDTH] IN BLOOD BY AUTOMATED COUNT: 13.1 %
EST. GFR  (AFRICAN AMERICAN): >60 ML/MIN/1.73 M^2
EST. GFR  (NON AFRICAN AMERICAN): >60 ML/MIN/1.73 M^2
GLUCOSE SERPL-MCNC: 224 MG/DL
GLUCOSE UR QL STRIP: ABNORMAL
HCT VFR BLD AUTO: 35.2 %
HGB BLD-MCNC: 12.6 G/DL
HGB UR QL STRIP: ABNORMAL
HYALINE CASTS #/AREA URNS LPF: 0 /LPF
KETONES UR QL STRIP: NEGATIVE
LEUKOCYTE ESTERASE UR QL STRIP: NEGATIVE
LIPASE SERPL-CCNC: 36 U/L
LYMPHOCYTES # BLD AUTO: 1.6 K/UL
LYMPHOCYTES NFR BLD: 30 %
MCH RBC QN AUTO: 28.8 PG
MCHC RBC AUTO-ENTMCNC: 35.8 G/DL
MCV RBC AUTO: 80 FL
MICROSCOPIC COMMENT: ABNORMAL
MONOCYTES # BLD AUTO: 0.6 K/UL
MONOCYTES NFR BLD: 11.4 %
NEUTROPHILS # BLD AUTO: 2.9 K/UL
NEUTROPHILS NFR BLD: 54.1 %
NITRITE UR QL STRIP: NEGATIVE
PH UR STRIP: 8 [PH] (ref 5–8)
PLATELET # BLD AUTO: 258 K/UL
PMV BLD AUTO: 8.9 FL
POTASSIUM SERPL-SCNC: 3.7 MMOL/L
PROT SERPL-MCNC: 8.2 G/DL
PROT UR QL STRIP: ABNORMAL
RBC # BLD AUTO: 4.38 M/UL
RBC #/AREA URNS HPF: 7 /HPF (ref 0–4)
SODIUM SERPL-SCNC: 144 MMOL/L
SP GR UR STRIP: 1.02 (ref 1–1.03)
SQUAMOUS #/AREA URNS HPF: 4 /HPF
TROPONIN I SERPL DL<=0.01 NG/ML-MCNC: 0.03 NG/ML
URN SPEC COLLECT METH UR: ABNORMAL
UROBILINOGEN UR STRIP-ACNC: ABNORMAL EU/DL
WBC # BLD AUTO: 5.37 K/UL
WBC #/AREA URNS HPF: 7 /HPF (ref 0–5)

## 2017-10-03 PROCEDURE — 63600175 PHARM REV CODE 636 W HCPCS: Performed by: EMERGENCY MEDICINE

## 2017-10-03 PROCEDURE — 96361 HYDRATE IV INFUSION ADD-ON: CPT

## 2017-10-03 PROCEDURE — C9113 INJ PANTOPRAZOLE SODIUM, VIA: HCPCS | Performed by: EMERGENCY MEDICINE

## 2017-10-03 PROCEDURE — 83690 ASSAY OF LIPASE: CPT

## 2017-10-03 PROCEDURE — 80053 COMPREHEN METABOLIC PANEL: CPT

## 2017-10-03 PROCEDURE — 25500020 PHARM REV CODE 255: Performed by: EMERGENCY MEDICINE

## 2017-10-03 PROCEDURE — 93005 ELECTROCARDIOGRAM TRACING: CPT

## 2017-10-03 PROCEDURE — 96376 TX/PRO/DX INJ SAME DRUG ADON: CPT

## 2017-10-03 PROCEDURE — 99285 EMERGENCY DEPT VISIT HI MDM: CPT | Mod: 25

## 2017-10-03 PROCEDURE — 84484 ASSAY OF TROPONIN QUANT: CPT

## 2017-10-03 PROCEDURE — 96374 THER/PROPH/DIAG INJ IV PUSH: CPT

## 2017-10-03 PROCEDURE — 81000 URINALYSIS NONAUTO W/SCOPE: CPT

## 2017-10-03 PROCEDURE — 93010 ELECTROCARDIOGRAM REPORT: CPT | Mod: ,,, | Performed by: INTERNAL MEDICINE

## 2017-10-03 PROCEDURE — 85025 COMPLETE CBC W/AUTO DIFF WBC: CPT

## 2017-10-03 PROCEDURE — 83880 ASSAY OF NATRIURETIC PEPTIDE: CPT

## 2017-10-03 PROCEDURE — 96375 TX/PRO/DX INJ NEW DRUG ADDON: CPT

## 2017-10-03 PROCEDURE — 25000003 PHARM REV CODE 250: Performed by: EMERGENCY MEDICINE

## 2017-10-03 RX ORDER — SODIUM CHLORIDE 9 MG/ML
1000 INJECTION, SOLUTION INTRAVENOUS
Status: COMPLETED | OUTPATIENT
Start: 2017-10-03 | End: 2017-10-03

## 2017-10-03 RX ORDER — ONDANSETRON 2 MG/ML
4 INJECTION INTRAMUSCULAR; INTRAVENOUS
Status: COMPLETED | OUTPATIENT
Start: 2017-10-03 | End: 2017-10-03

## 2017-10-03 RX ORDER — PANTOPRAZOLE SODIUM 40 MG/10ML
40 INJECTION, POWDER, LYOPHILIZED, FOR SOLUTION INTRAVENOUS
Status: COMPLETED | OUTPATIENT
Start: 2017-10-03 | End: 2017-10-03

## 2017-10-03 RX ORDER — LANSOPRAZOLE 30 MG/1
30 CAPSULE, DELAYED RELEASE ORAL DAILY
Status: ON HOLD | COMMUNITY
End: 2020-12-11 | Stop reason: HOSPADM

## 2017-10-03 RX ORDER — GLIPIZIDE 10 MG/1
10 TABLET ORAL
Status: ON HOLD | COMMUNITY
End: 2019-02-15 | Stop reason: HOSPADM

## 2017-10-03 RX ORDER — LOSARTAN POTASSIUM AND HYDROCHLOROTHIAZIDE 25; 100 MG/1; MG/1
1 TABLET ORAL DAILY
Status: ON HOLD | COMMUNITY
End: 2020-11-27 | Stop reason: HOSPADM

## 2017-10-03 RX ORDER — POTASSIUM CHLORIDE 750 MG/1
20 CAPSULE, EXTENDED RELEASE ORAL ONCE
Status: ON HOLD | COMMUNITY
End: 2020-03-25 | Stop reason: HOSPADM

## 2017-10-03 RX ORDER — ROSUVASTATIN CALCIUM 40 MG/1
10 TABLET, COATED ORAL DAILY
Status: ON HOLD | COMMUNITY
End: 2020-12-10 | Stop reason: HOSPADM

## 2017-10-03 RX ORDER — MORPHINE SULFATE 10 MG/ML
4 INJECTION INTRAMUSCULAR; INTRAVENOUS; SUBCUTANEOUS
Status: COMPLETED | OUTPATIENT
Start: 2017-10-03 | End: 2017-10-03

## 2017-10-03 RX ADMIN — IOHEXOL 80 ML: 350 INJECTION, SOLUTION INTRAVENOUS at 09:10

## 2017-10-03 RX ADMIN — PANTOPRAZOLE SODIUM 40 MG: 40 INJECTION, POWDER, FOR SOLUTION INTRAVENOUS at 06:10

## 2017-10-03 RX ADMIN — SODIUM CHLORIDE 1000 ML: 0.9 INJECTION, SOLUTION INTRAVENOUS at 10:10

## 2017-10-03 RX ADMIN — MORPHINE SULFATE 4 MG: 10 INJECTION INTRAVENOUS at 09:10

## 2017-10-03 RX ADMIN — ONDANSETRON 4 MG: 2 INJECTION INTRAMUSCULAR; INTRAVENOUS at 06:10

## 2017-10-03 NOTE — ED NOTES
Dr. Worley made aware of pt's symptoms,abd pain, n/v, approx 300ml of bile noted in emesis bag, verbalized understanding, md placing new orders

## 2017-10-04 VITALS
TEMPERATURE: 98 F | BODY MASS INDEX: 29.4 KG/M2 | OXYGEN SATURATION: 98 % | SYSTOLIC BLOOD PRESSURE: 174 MMHG | HEART RATE: 87 BPM | WEIGHT: 210 LBS | RESPIRATION RATE: 18 BRPM | DIASTOLIC BLOOD PRESSURE: 98 MMHG | HEIGHT: 71 IN

## 2017-10-04 LAB — TROPONIN I SERPL DL<=0.01 NG/ML-MCNC: 0.02 NG/ML

## 2017-10-04 PROCEDURE — 63600175 PHARM REV CODE 636 W HCPCS: Performed by: EMERGENCY MEDICINE

## 2017-10-04 PROCEDURE — 25000003 PHARM REV CODE 250: Performed by: EMERGENCY MEDICINE

## 2017-10-04 PROCEDURE — 84484 ASSAY OF TROPONIN QUANT: CPT

## 2017-10-04 RX ORDER — CARVEDILOL 6.25 MG/1
12.5 TABLET ORAL
Status: COMPLETED | OUTPATIENT
Start: 2017-10-04 | End: 2017-10-04

## 2017-10-04 RX ORDER — AMLODIPINE BESYLATE 5 MG/1
10 TABLET ORAL
Status: COMPLETED | OUTPATIENT
Start: 2017-10-04 | End: 2017-10-04

## 2017-10-04 RX ORDER — MORPHINE SULFATE 10 MG/ML
4 INJECTION INTRAMUSCULAR; INTRAVENOUS; SUBCUTANEOUS
Status: COMPLETED | OUTPATIENT
Start: 2017-10-04 | End: 2017-10-04

## 2017-10-04 RX ORDER — CEPHALEXIN 500 MG/1
500 CAPSULE ORAL EVERY 8 HOURS
Qty: 15 CAPSULE | Refills: 0 | Status: SHIPPED | OUTPATIENT
Start: 2017-10-04 | End: 2017-10-09

## 2017-10-04 RX ORDER — CEPHALEXIN 250 MG/1
500 CAPSULE ORAL
Status: COMPLETED | OUTPATIENT
Start: 2017-10-04 | End: 2017-10-04

## 2017-10-04 RX ORDER — LISINOPRIL 20 MG/1
40 TABLET ORAL
Status: COMPLETED | OUTPATIENT
Start: 2017-10-04 | End: 2017-10-04

## 2017-10-04 RX ADMIN — LISINOPRIL 40 MG: 20 TABLET ORAL at 02:10

## 2017-10-04 RX ADMIN — CEPHALEXIN 500 MG: 250 CAPSULE ORAL at 02:10

## 2017-10-04 RX ADMIN — MORPHINE SULFATE 4 MG: 10 INJECTION INTRAVENOUS at 01:10

## 2017-10-04 RX ADMIN — CARVEDILOL 12.5 MG: 6.25 TABLET, FILM COATED ORAL at 02:10

## 2017-10-04 RX ADMIN — AMLODIPINE BESYLATE 10 MG: 5 TABLET ORAL at 02:10

## 2017-10-04 NOTE — ED NOTES
Pt has involuntary intermittent shaking of arms and upper body. MD made aware. Temp assessed 98.4. Safety maintained.

## 2017-10-04 NOTE — ED TRIAGE NOTES
Pt presents to ED via EMS with mid and RLQ abdominal pain and tenderness x 1 week with N/V/D that started this AM. Pt denies chest pain, headache and SOB due to pain.

## 2017-10-04 NOTE — ED NOTES
Pt is aware that a designated  is needed since morphine medication will be administered to him. Pt states that he does not have a ride and will take a cab. Pt was explained that he will have to be monitored in the ed for 4hrs if medication will be administered. MD consulted. Pt's agree's to having to wait for monitoring after administration if discharged. No questions or concerns voiced.

## 2017-10-04 NOTE — ED PROVIDER NOTES
Encounter Date: 10/3/2017    SCRIBE #1 NOTE: I, Connie Leos, am scribing for, and in the presence of,  Martin Pepper MD. I have scribed the following portions of the note - Other sections scribed: HPI, ROS.       History     Chief Complaint   Patient presents with    Abdominal Pain     pt c/o abd pain x1wk with 1 episode of n/v this morning but worsened this morning. pt also c/o headache vomiting upon arrival      CC: Abdominal Pain    HPI: 66 year old male with DM, HTN, hyperlipemia, CAD, and arthritis presents to the ED via EMS c/o RUQ, LLQ, and epigastric abdominal pain x 1 week and nausea, vomiting (x 2), and diarrhea (x 1) that began yesterday. Pain is 10/10 and has worsened today. Pt attempted to eat today but was unable to keep anything down. Pt reports having similar pain in the past. Pt otherwise denies fever, chills, chest pain, SOB, dysuria, back pain, and any other associated symptoms. No alleviating factors.      The history is provided by the patient. No  was used.     Review of patient's allergies indicates:  No Known Allergies  Past Medical History:   Diagnosis Date    Arthritis     Coronary artery disease     Diabetes mellitus     Hyperlipemia     Hypertension      Past Surgical History:   Procedure Laterality Date    EYE SURGERY      VASCULAR SURGERY       Family History   Problem Relation Age of Onset    Hypertension Mother     Diabetes Mother     Diabetes Father     Hypertension Father     Diabetes Sister     Hypertension Sister      Social History   Substance Use Topics    Smoking status: Never Smoker    Smokeless tobacco: Never Used    Alcohol use 2.4 oz/week     4 Cans of beer per week     Review of Systems   Constitutional: Negative for chills, diaphoresis and fever.   HENT: Negative for ear pain and sore throat.    Eyes: Negative for photophobia and visual disturbance.   Respiratory: Negative for cough and shortness of breath.    Cardiovascular: Negative  for chest pain.   Gastrointestinal: Positive for abdominal pain, diarrhea, nausea and vomiting.   Genitourinary: Negative for dysuria.   Musculoskeletal: Negative for back pain.   Skin: Negative for rash.   Neurological: Negative for headaches.       Physical Exam     Initial Vitals [10/03/17 1755]   BP Pulse Resp Temp SpO2   (!) 201/117 103 18 99.3 °F (37.4 °C) 99 %      MAP       145         Physical Exam    Constitutional: He appears well-developed and well-nourished. He is not diaphoretic. No distress.   HENT:   Head: Normocephalic and atraumatic.   Nose: Nose normal.   Mouth/Throat: Oropharynx is clear and moist. No oropharyngeal exudate.   Eyes: Conjunctivae and EOM are normal. Pupils are equal, round, and reactive to light. No scleral icterus.   Neck: Normal range of motion. Neck supple. No thyromegaly present. No tracheal deviation present.   Cardiovascular: Normal rate, regular rhythm and normal heart sounds. Exam reveals no gallop and no friction rub.    No murmur heard.  Pulmonary/Chest: Breath sounds normal. No respiratory distress. He has no wheezes. He has no rhonchi. He has no rales.   Abdominal: Soft. Bowel sounds are normal. He exhibits no distension and no mass. There is tenderness (Ruq, epigastric, LLQ). There is no rebound and no guarding.   Musculoskeletal: Normal range of motion. He exhibits no edema or tenderness.   Lymphadenopathy:     He has no cervical adenopathy.   Neurological: He is alert and oriented to person, place, and time. He has normal strength. No cranial nerve deficit or sensory deficit.   Skin: Skin is warm and dry. No rash noted. No erythema. No pallor.   Psychiatric: He has a normal mood and affect. His behavior is normal. Thought content normal.         ED Course   Procedures  Labs Reviewed   CBC W/ AUTO DIFFERENTIAL - Abnormal; Notable for the following:        Result Value    RBC 4.38 (*)     Hemoglobin 12.6 (*)     Hematocrit 35.2 (*)     MCV 80 (*)     MPV 8.9 (*)      Basophil% 2.6 (*)     All other components within normal limits    Narrative:     For upper or mid abdominal pain.   COMPREHENSIVE METABOLIC PANEL - Abnormal; Notable for the following:     Glucose 224 (*)     AST 44 (*)     ALT 52 (*)     All other components within normal limits    Narrative:     For upper or mid abdominal pain.   LIPASE    Narrative:     For upper or mid abdominal pain.   URINALYSIS   LIPASE   TROPONIN I   B-TYPE NATRIURETIC PEPTIDE             Medical Decision Making:   Initial Assessment:   66-year-old male presents complaining of right mid abdominal pain for the last week suddenly worsened in the last 24 hours.  He reports 2 episodes of vomiting and one episode of diarrhea.  Physical examination reveals epigastric and RUQ tenderness, and left lower quadrant tenderness.   Differential Diagnosis:   Broad, includes gastritis, peptic disease, gallbladder disease, appendicitis, colitis, pancreatitis, small bowel obstruction, mesenteric ischemia, urinary tract infection  Independently Interpreted Test(s):   I have ordered and independently interpreted X-rays - see summary below.       <> Summary of X-Ray Reading(s): CT abdomen: Hepatic steatosis, no acute abnormality  I have ordered and independently interpreted EKG Reading(s) - see summary below       <> Summary of EKG Reading(s): Sinus tachycardia at 101 bpm, normal axis, prolonged QT, lateral ST depressions and T-wave inversions that are chronic  ED Management:  CT unremarkable.  Laboratory evaluation unremarkable except for microscopic hematuria and pyuria.  On reevaluation the patient is now reporting suprapubic pain and tenderness, with no other pain or tenderness at all.  When I comment that location of pain and tenderness seems to changed, he denies this, stating that his pain has been suprapubic all along.  Continue to deny nausea, vomiting, diarrhea.  He remains moderate to severely hypertensive but with no other concerning abnormalities.   His initial troponin was very slightly elevated, though of unknown significance given no chest pain, shortness of breath, weakness, paresthesias, nausea, vomiting.  He does have a history of severe diastolic dysfunction with baseline abnormal EKG, which may explain his slightly abnormal troponin.  Out of an abundance of caution, we'll repeat this troponin at 6 hours.    Repeat troponin essentially stable, now in the normal range.  Continues to have only suprapubic complaints.  Will treat for cystitis and discharge with return precautions.            Scribe Attestation:   Scribe #1: I performed the above scribed service and the documentation accurately describes the services I performed. I attest to the accuracy of the note.    Attending Attestation:           Physician Attestation for Scribe:  Physician Attestation Statement for Scribe #1: I, Martin Pepper MD, reviewed documentation, as scribed by Connie Leos in my presence, and it is both accurate and complete.                 ED Course      Clinical Impression:   The primary encounter diagnosis was Cystitis. A diagnosis of Abdominal pain was also pertinent to this visit.                           Martin Pepper III, MD  10/04/17 0243

## 2017-10-04 NOTE — DISCHARGE INSTRUCTIONS
Please return to the ER if you develop worsening abdominal pain, persistent vomiting, fever higher than 100.4°, or for any new or worsening medical concerns.

## 2019-02-14 ENCOUNTER — HOSPITAL ENCOUNTER (INPATIENT)
Facility: HOSPITAL | Age: 69
LOS: 1 days | Discharge: HOME | DRG: 638 | End: 2019-02-15
Attending: EMERGENCY MEDICINE | Admitting: INTERNAL MEDICINE
Payer: MEDICARE

## 2019-02-14 DIAGNOSIS — N28.9 ACUTE RENAL INSUFFICIENCY: ICD-10-CM

## 2019-02-14 DIAGNOSIS — E16.2 HYPOGLYCEMIA: Primary | ICD-10-CM

## 2019-02-14 DIAGNOSIS — E87.6 HYPOKALEMIA: ICD-10-CM

## 2019-02-14 DIAGNOSIS — T68.XXXA HYPOTHERMIA, INITIAL ENCOUNTER: ICD-10-CM

## 2019-02-14 DIAGNOSIS — R41.82 ALTERED MENTAL STATUS, UNSPECIFIED ALTERED MENTAL STATUS TYPE: ICD-10-CM

## 2019-02-14 PROBLEM — N19 RENAL FAILURE: Status: ACTIVE | Noted: 2019-02-14

## 2019-02-14 PROBLEM — F10.10 ALCOHOL ABUSE: Status: ACTIVE | Noted: 2019-02-14

## 2019-02-14 LAB
ALBUMIN SERPL BCP-MCNC: 3.5 G/DL
ALP SERPL-CCNC: 74 U/L
ALT SERPL W/O P-5'-P-CCNC: 15 U/L
ANION GAP SERPL CALC-SCNC: 10 MMOL/L
AST SERPL-CCNC: 25 U/L
BACTERIA #/AREA URNS HPF: ABNORMAL /HPF
BASOPHILS # BLD AUTO: 0.04 K/UL
BASOPHILS NFR BLD: 0.6 %
BILIRUB SERPL-MCNC: 0.6 MG/DL
BILIRUB UR QL STRIP: NEGATIVE
BUN SERPL-MCNC: 24 MG/DL
CALCIUM SERPL-MCNC: 9.2 MG/DL
CHLORIDE SERPL-SCNC: 96 MMOL/L
CLARITY UR: CLEAR
CO2 SERPL-SCNC: 31 MMOL/L
COLOR UR: COLORLESS
CREAT SERPL-MCNC: 2.2 MG/DL
DIFFERENTIAL METHOD: ABNORMAL
EOSINOPHIL # BLD AUTO: 0.1 K/UL
EOSINOPHIL NFR BLD: 1.7 %
ERYTHROCYTE [DISTWIDTH] IN BLOOD BY AUTOMATED COUNT: 13.5 %
EST. GFR  (AFRICAN AMERICAN): 34 ML/MIN/1.73 M^2
EST. GFR  (NON AFRICAN AMERICAN): 30 ML/MIN/1.73 M^2
ESTIMATED AVG GLUCOSE: 180 MG/DL
ESTIMATED AVG GLUCOSE: 180 MG/DL
GLUCOSE SERPL-MCNC: 103 MG/DL (ref 70–110)
GLUCOSE SERPL-MCNC: 150 MG/DL
GLUCOSE SERPL-MCNC: 191 MG/DL (ref 70–110)
GLUCOSE SERPL-MCNC: 76 MG/DL (ref 70–110)
GLUCOSE SERPL-MCNC: 76 MG/DL (ref 70–110)
GLUCOSE UR QL STRIP: NEGATIVE
HBA1C MFR BLD HPLC: 7.9 %
HBA1C MFR BLD HPLC: 7.9 %
HCT VFR BLD AUTO: 36.3 %
HGB BLD-MCNC: 12.7 G/DL
HGB UR QL STRIP: ABNORMAL
HYALINE CASTS #/AREA URNS LPF: 0 /LPF
KETONES UR QL STRIP: NEGATIVE
LACTATE SERPL-SCNC: 1.8 MMOL/L
LEUKOCYTE ESTERASE UR QL STRIP: NEGATIVE
LYMPHOCYTES # BLD AUTO: 0.9 K/UL
LYMPHOCYTES NFR BLD: 12.8 %
MCH RBC QN AUTO: 29 PG
MCHC RBC AUTO-ENTMCNC: 35 G/DL
MCV RBC AUTO: 83 FL
MICROSCOPIC COMMENT: ABNORMAL
MONOCYTES # BLD AUTO: 0.9 K/UL
MONOCYTES NFR BLD: 14 %
NEUTROPHILS # BLD AUTO: 4.7 K/UL
NEUTROPHILS NFR BLD: 70.9 %
NITRITE UR QL STRIP: NEGATIVE
PH UR STRIP: 7 [PH] (ref 5–8)
PLATELET # BLD AUTO: 218 K/UL
PMV BLD AUTO: 9 FL
POCT GLUCOSE: 103 MG/DL (ref 70–110)
POCT GLUCOSE: 241 MG/DL (ref 70–110)
POCT GLUCOSE: 247 MG/DL (ref 70–110)
POTASSIUM SERPL-SCNC: 2.4 MMOL/L
PROT SERPL-MCNC: 7.5 G/DL
PROT UR QL STRIP: ABNORMAL
RBC # BLD AUTO: 4.38 M/UL
RBC #/AREA URNS HPF: 10 /HPF (ref 0–4)
SODIUM SERPL-SCNC: 137 MMOL/L
SP GR UR STRIP: 1 (ref 1–1.03)
URN SPEC COLLECT METH UR: ABNORMAL
UROBILINOGEN UR STRIP-ACNC: NEGATIVE EU/DL
WBC # BLD AUTO: 6.63 K/UL
WBC #/AREA URNS HPF: 2 /HPF (ref 0–5)

## 2019-02-14 PROCEDURE — 11000001 HC ACUTE MED/SURG PRIVATE ROOM

## 2019-02-14 PROCEDURE — 81000 URINALYSIS NONAUTO W/SCOPE: CPT

## 2019-02-14 PROCEDURE — 25000003 PHARM REV CODE 250: Performed by: INTERNAL MEDICINE

## 2019-02-14 PROCEDURE — 63600175 PHARM REV CODE 636 W HCPCS: Performed by: INTERNAL MEDICINE

## 2019-02-14 PROCEDURE — 99284 EMERGENCY DEPT VISIT MOD MDM: CPT | Mod: 25

## 2019-02-14 PROCEDURE — 80053 COMPREHEN METABOLIC PANEL: CPT

## 2019-02-14 PROCEDURE — 36415 COLL VENOUS BLD VENIPUNCTURE: CPT

## 2019-02-14 PROCEDURE — 25000003 PHARM REV CODE 250: Performed by: EMERGENCY MEDICINE

## 2019-02-14 PROCEDURE — 83605 ASSAY OF LACTIC ACID: CPT

## 2019-02-14 PROCEDURE — 85025 COMPLETE CBC W/AUTO DIFF WBC: CPT

## 2019-02-14 PROCEDURE — 83036 HEMOGLOBIN GLYCOSYLATED A1C: CPT | Mod: 91

## 2019-02-14 PROCEDURE — 63600175 PHARM REV CODE 636 W HCPCS: Performed by: EMERGENCY MEDICINE

## 2019-02-14 PROCEDURE — 25000003 PHARM REV CODE 250

## 2019-02-14 PROCEDURE — 83036 HEMOGLOBIN GLYCOSYLATED A1C: CPT

## 2019-02-14 PROCEDURE — 82962 GLUCOSE BLOOD TEST: CPT

## 2019-02-14 RX ORDER — ASPIRIN 81 MG/1
81 TABLET ORAL DAILY
Status: DISCONTINUED | OUTPATIENT
Start: 2019-02-14 | End: 2019-02-15 | Stop reason: HOSPADM

## 2019-02-14 RX ORDER — INSULIN ASPART 100 [IU]/ML
1-10 INJECTION, SOLUTION INTRAVENOUS; SUBCUTANEOUS
Status: DISCONTINUED | OUTPATIENT
Start: 2019-02-14 | End: 2019-02-15 | Stop reason: HOSPADM

## 2019-02-14 RX ORDER — PANTOPRAZOLE SODIUM 40 MG/1
40 TABLET, DELAYED RELEASE ORAL DAILY
Status: DISCONTINUED | OUTPATIENT
Start: 2019-02-14 | End: 2019-02-15 | Stop reason: HOSPADM

## 2019-02-14 RX ORDER — IBUPROFEN 200 MG
24 TABLET ORAL
Status: DISCONTINUED | OUTPATIENT
Start: 2019-02-14 | End: 2019-02-15 | Stop reason: HOSPADM

## 2019-02-14 RX ORDER — AMLODIPINE BESYLATE 5 MG/1
10 TABLET ORAL DAILY
Status: DISCONTINUED | OUTPATIENT
Start: 2019-02-14 | End: 2019-02-15 | Stop reason: HOSPADM

## 2019-02-14 RX ORDER — ATORVASTATIN CALCIUM 40 MG/1
80 TABLET, FILM COATED ORAL NIGHTLY
Status: DISCONTINUED | OUTPATIENT
Start: 2019-02-14 | End: 2019-02-15 | Stop reason: HOSPADM

## 2019-02-14 RX ORDER — SODIUM CHLORIDE 0.9 % (FLUSH) 0.9 %
5 SYRINGE (ML) INJECTION
Status: DISCONTINUED | OUTPATIENT
Start: 2019-02-14 | End: 2019-02-15 | Stop reason: HOSPADM

## 2019-02-14 RX ORDER — DEXTROSE 50 % IN WATER (D50W) INTRAVENOUS SYRINGE
Status: COMPLETED
Start: 2019-02-14 | End: 2019-02-14

## 2019-02-14 RX ORDER — LOSARTAN POTASSIUM AND HYDROCHLOROTHIAZIDE 12.5; 5 MG/1; MG/1
2 TABLET ORAL DAILY
Status: DISCONTINUED | OUTPATIENT
Start: 2019-02-14 | End: 2019-02-15 | Stop reason: HOSPADM

## 2019-02-14 RX ORDER — HYDROCODONE BITARTRATE AND ACETAMINOPHEN 5; 325 MG/1; MG/1
1 TABLET ORAL EVERY 4 HOURS PRN
Status: DISCONTINUED | OUTPATIENT
Start: 2019-02-14 | End: 2019-02-15 | Stop reason: HOSPADM

## 2019-02-14 RX ORDER — POTASSIUM CHLORIDE 20 MEQ/15ML
40 SOLUTION ORAL
Status: COMPLETED | OUTPATIENT
Start: 2019-02-14 | End: 2019-02-14

## 2019-02-14 RX ORDER — ISOSORBIDE DINITRATE AND HYDRALAZINE HYDROCHLORIDE 37.5; 2 MG/1; MG/1
1 TABLET ORAL 2 TIMES DAILY
Status: DISCONTINUED | OUTPATIENT
Start: 2019-02-14 | End: 2019-02-15 | Stop reason: HOSPADM

## 2019-02-14 RX ORDER — POTASSIUM CHLORIDE 20 MEQ/15ML
40 SOLUTION ORAL 2 TIMES DAILY
Status: DISCONTINUED | OUTPATIENT
Start: 2019-02-14 | End: 2019-02-15 | Stop reason: HOSPADM

## 2019-02-14 RX ORDER — GLUCAGON 1 MG
1 KIT INJECTION
Status: DISCONTINUED | OUTPATIENT
Start: 2019-02-14 | End: 2019-02-15 | Stop reason: HOSPADM

## 2019-02-14 RX ORDER — IBUPROFEN 200 MG
16 TABLET ORAL
Status: DISCONTINUED | OUTPATIENT
Start: 2019-02-14 | End: 2019-02-15 | Stop reason: HOSPADM

## 2019-02-14 RX ORDER — CARVEDILOL 6.25 MG/1
12.5 TABLET ORAL 2 TIMES DAILY
Status: DISCONTINUED | OUTPATIENT
Start: 2019-02-14 | End: 2019-02-15 | Stop reason: HOSPADM

## 2019-02-14 RX ORDER — DEXTROSE 50 % IN WATER (D50W) INTRAVENOUS SYRINGE
25
Status: DISCONTINUED | OUTPATIENT
Start: 2019-02-14 | End: 2019-02-14

## 2019-02-14 RX ORDER — POTASSIUM CHLORIDE 20 MEQ/15ML
40 SOLUTION ORAL EVERY 4 HOURS
Status: DISCONTINUED | OUTPATIENT
Start: 2019-02-14 | End: 2019-02-14

## 2019-02-14 RX ORDER — DEXTROSE 50 % IN WATER (D50W) INTRAVENOUS SYRINGE
25
Status: COMPLETED | OUTPATIENT
Start: 2019-02-14 | End: 2019-02-14

## 2019-02-14 RX ORDER — SODIUM CHLORIDE 9 MG/ML
INJECTION, SOLUTION INTRAVENOUS CONTINUOUS
Status: DISCONTINUED | OUTPATIENT
Start: 2019-02-14 | End: 2019-02-15 | Stop reason: HOSPADM

## 2019-02-14 RX ORDER — ENOXAPARIN SODIUM 100 MG/ML
30 INJECTION SUBCUTANEOUS EVERY 24 HOURS
Status: DISCONTINUED | OUTPATIENT
Start: 2019-02-14 | End: 2019-02-15

## 2019-02-14 RX ORDER — POLYETHYLENE GLYCOL 3350 17 G/17G
17 POWDER, FOR SOLUTION ORAL DAILY
Status: DISCONTINUED | OUTPATIENT
Start: 2019-02-14 | End: 2019-02-15 | Stop reason: HOSPADM

## 2019-02-14 RX ADMIN — PANTOPRAZOLE SODIUM 40 MG: 40 TABLET, DELAYED RELEASE ORAL at 01:02

## 2019-02-14 RX ADMIN — HYDRALAZINE HYDROCHLORIDE AND ISOSORBIDE DINITRATE 1 TABLET: 37.5; 2 TABLET, FILM COATED ORAL at 10:02

## 2019-02-14 RX ADMIN — ASPIRIN 81 MG: 81 TABLET, COATED ORAL at 01:02

## 2019-02-14 RX ADMIN — SODIUM CHLORIDE 1000 ML: 0.9 INJECTION, SOLUTION INTRAVENOUS at 09:02

## 2019-02-14 RX ADMIN — ATORVASTATIN CALCIUM 80 MG: 40 TABLET, FILM COATED ORAL at 09:02

## 2019-02-14 RX ADMIN — POTASSIUM CHLORIDE 40 MEQ: 1.5 SOLUTION ORAL at 08:02

## 2019-02-14 RX ADMIN — CARVEDILOL 12.5 MG: 6.25 TABLET, FILM COATED ORAL at 09:02

## 2019-02-14 RX ADMIN — CARVEDILOL 12.5 MG: 6.25 TABLET, FILM COATED ORAL at 01:02

## 2019-02-14 RX ADMIN — DEXTROSE 50 % IN WATER (D50W) INTRAVENOUS SYRINGE 25 G: at 06:02

## 2019-02-14 RX ADMIN — HYDRALAZINE HYDROCHLORIDE AND ISOSORBIDE DINITRATE 1 TABLET: 37.5; 2 TABLET, FILM COATED ORAL at 01:02

## 2019-02-14 RX ADMIN — ENOXAPARIN SODIUM 30 MG: 100 INJECTION SUBCUTANEOUS at 05:02

## 2019-02-14 RX ADMIN — SODIUM CHLORIDE: 0.9 INJECTION, SOLUTION INTRAVENOUS at 01:02

## 2019-02-14 RX ADMIN — DEXTROSE MONOHYDRATE 25 G: 25 INJECTION, SOLUTION INTRAVENOUS at 06:02

## 2019-02-14 RX ADMIN — INSULIN ASPART 2 UNITS: 100 INJECTION, SOLUTION INTRAVENOUS; SUBCUTANEOUS at 10:02

## 2019-02-14 RX ADMIN — AMLODIPINE BESYLATE 10 MG: 5 TABLET ORAL at 01:02

## 2019-02-14 RX ADMIN — POTASSIUM CHLORIDE 40 MEQ: 1.5 SOLUTION ORAL at 09:02

## 2019-02-14 RX ADMIN — LOSARTAN POTASSIUM AND HYDROCHLOROTHIAZIDE 2 TABLET: 12.5; 5 TABLET ORAL at 01:02

## 2019-02-14 NOTE — H&P
[facility] Hospital Medicine  History & Physical    Patient Name: Chato Bowie  MRN: [id number]  Admission Date: 2/14/2019  Attending Physician: Raymond Cuellar MD   Primary Care Provider: [facility]         Patient information was obtained from patient and ER records.     Subjective:     Principal Problem:Hypoglycemia    Chief Complaint:   Chief Complaint   Patient presents with    Hypoglycemia     pt called 911 and was not answering dispatch so EMS arrived to pts house and found him lethargic with a cbg of 43, given oral glucose, 1 amp, and d5 gtt, upon re check cbg 47, pt incontinent of urine, pt is awake but slow to respond upon ED arrival        HPI: Mr. Bowie is a 67 yo M who presented to the ED today after EMS found the patient at home with a blood sugar of 45. He was given D50 in the field and was brought to the ED. Initially somewhat incoherent, by the time of my evaluation, he was completely back to his baseline.  He states that he took too much of his AM Insulin- 60 units instead of 50 long acting.  He notes that prior to this, he has had good control of his sugars and has not had an episode like this in the past.     Past Medical History:   Diagnosis Date    Arthritis     Coronary artery disease     Diabetes mellitus     Hyperlipemia     Hypertension        Past Surgical History:   Procedure Laterality Date    ESOPHAGOGASTRODUODENOSCOPY (EGD) N/A 3/9/2016    Performed by [facility] at [facility] ENDO    EYE SURGERY      VASCULAR SURGERY         Review of patient's allergies indicates:  No Known Allergies    No current facility-administered medications on file prior to encounter.      Current Outpatient Medications on File Prior to Encounter   Medication Sig    amlodipine (NORVASC) 10 MG tablet Take 10 mg by mouth once daily.    aspirin (ECOTRIN) 81 MG EC tablet Take 1 tablet (81 mg total) by mouth once daily.    atorvastatin (LIPITOR) 80 MG tablet Take 80  mg by mouth every evening.    carvedilol (COREG) 12.5 MG tablet Take 1 tablet (12.5 mg total) by mouth 2 (two) times daily. (Patient taking differently: Take 25 mg by mouth 2 (two) times daily. )    glipiZIDE (GLUCOTROL) 10 MG tablet Take 10 mg by mouth 2 (two) times daily before meals.    isosorbide-hydrALAZINE 20-37.5 mg (BIDIL) 20-37.5 mg Tab Take 1 tablet by mouth 2 (two) times daily.    lansoprazole (PREVACID) 30 MG capsule Take 30 mg by mouth once daily.    lisinopril (PRINIVIL,ZESTRIL) 40 MG tablet Take 40 mg by mouth once daily.    losartan-hydrochlorothiazide 100-25 mg (HYZAAR) 100-25 mg per tablet Take 1 tablet by mouth once daily.    metformin (GLUCOPHAGE) 1000 MG tablet Take 1,000 mg by mouth 2 (two) times daily with meals.    pantoprazole (PROTONIX) 40 MG tablet Take 1 tablet (40 mg total) by mouth 2 (two) times daily.    potassium chloride (MICRO-K) 10 MEQ CpSR Take 20 mEq by mouth once.    rosuvastatin (CRESTOR) 40 MG Tab Take 10 mg by mouth once daily.     Family History     Problem Relation (Age of Onset)    Diabetes Mother, Father, Sister    Hypertension Mother, Father, Sister        Tobacco Use    Smoking status: Never Smoker    Smokeless tobacco: Never Used   Substance and Sexual Activity    Alcohol use: Yes     Alcohol/week: 2.4 oz     Types: 4 Cans of beer per week    Drug use: No    Sexual activity: Not on file     Review of Systems   Constitutional: Negative for activity change and diaphoresis.   HENT: Negative for congestion.    Respiratory: Negative for chest tightness and shortness of breath.    Cardiovascular: Negative for chest pain and palpitations.   Gastrointestinal: Negative for abdominal distention, blood in stool, constipation, nausea and vomiting.   Genitourinary: Negative for difficulty urinating and flank pain.   Musculoskeletal: Negative for arthralgias.   Skin: Negative for color change and pallor.   Psychiatric/Behavioral: Positive for confusion. Negative for  agitation and behavioral problems.     Objective:     Vital Signs (Most Recent):  Temp: 98.3 °F (36.8 °C) (02/14/19 1132)  Pulse: 75 (02/14/19 1132)  Resp: 18 (02/14/19 1132)  BP: (!) 162/91 (02/14/19 1132)  SpO2: 95 % (02/14/19 1132) Vital Signs (24h Range):  Temp:  [94.5 °F (34.7 °C)-98.3 °F (36.8 °C)] 98.3 °F (36.8 °C)  Pulse:  [60-75] 75  Resp:  [16-20] 18  SpO2:  [94 %-100 %] 95 %  BP: (157-189)/(87-99) 162/91     Weight: 95.3 kg (210 lb)  Body mass index is 29.29 kg/m².    Physical Exam   Constitutional: He is oriented to person, place, and time. He appears well-developed and well-nourished.   HENT:   Head: Normocephalic and atraumatic.   Cardiovascular: Normal rate. Exam reveals no gallop and no friction rub.   Pulmonary/Chest: Effort normal and breath sounds normal.   Abdominal: Soft. He exhibits no distension. There is no rebound and no guarding.   Neurological: He is alert and oriented to person, place, and time.   Skin: Skin is warm and dry.   Psychiatric: He has a normal mood and affect. His behavior is normal.   Vitals reviewed.          Significant Labs:   BMP:   Recent Labs   Lab 02/14/19  0645   *      K 2.4*   CL 96   CO2 31*   BUN 24*   CREATININE 2.2*   CALCIUM 9.2     CBC:   Recent Labs   Lab 02/14/19  0645   WBC 6.63   HGB 12.7*   HCT 36.3*          Significant Imaging:    Assessment/Plan:     * Hypoglycemia    This was likely secondary to patient taking too much of his insulin this am. Normally has good control of sugars. No previous hypoglycemic episodes in the past. Will check an A1C.  He initially presented with AMS from low sugars- this has resolved and the patient is at his baseline now.  A contributing factor could also be decreased GFR to 30. Of note- 10/17 the patient had baseline normal renal function.  Will monitor overnight and likely send home in am.        Type 2 diabetes mellitus, uncontrolled    Uncontrolled with hypoglycemia. A1C   May have renal  manifestations. Will know more by tomorrow.          Alcohol abuse    States he drinks 1/2 pint every other day or so.  Will monitor for DT's.  Give outpatient referral for ETOH abuse.          Renal failure    See problem #1. WIll start IV fluids and check BMP in am.          Hypokalemia    Will replace.          Hypothermia    From hypoglycemia.  Resolved.          GERD (gastroesophageal reflux disease)    Resume home meds.          Mild protein malnutrition    Will discuss with patient        Anemia of chronic disease    No acute issues.          Hyperlipidemia    Resume home meds.        Essential hypertension    Resume home meds.          CAD (coronary artery disease)    Stable. No acute issues.          hypokalemia- will replace.     VTE Risk Mitigation (From admission, onward)        Ordered     IP VTE LOW RISK PATIENT  Once      02/14/19 1204     Place MASOOD hose  Until discontinued      02/14/19 1204         Monitor today- home in am      Raymond Mccormack MD  Department of Hospital Medicine   [facility]

## 2019-02-14 NOTE — ASSESSMENT & PLAN NOTE
This was likely secondary to patient taking too much of his insulin this am. Normally has good control of sugars. No previous hypoglycemic episodes in the past. Will check an A1C.  He initially presented with AMS from low sugars- this has resolved and the patient is at his baseline now.  A contributing factor could also be decreased GFR to 30. Of note- 10/17 the patient had baseline normal renal function.  Will monitor overnight and likely send home in am.

## 2019-02-14 NOTE — HPI
Mr. Bowie is a 67 yo M who presented to the ED today after EMS found the patient at home with a blood sugar of 45. He was given D50 in the field and was brought to the ED. Initially somewhat incoherent, by the time of my evaluation, he was completely back to his baseline.  He states that he took too much of his AM Insulin- 60 units instead of 50 long acting.  He notes that prior to this, he has had good control of his sugars and has not had an episode like this in the past.

## 2019-02-14 NOTE — ASSESSMENT & PLAN NOTE
States he drinks 1/2 pint every other day or so.  Will monitor for DT's.  Give outpatient referral for ETOH abuse.

## 2019-02-14 NOTE — ASSESSMENT & PLAN NOTE
Uncontrolled with hypoglycemia. A1C   May have renal manifestations. Will know more by tomorrow.

## 2019-02-14 NOTE — ED NOTES
Received report from [facility]; pt assessed; vitals assessed; call light in reach; side rails raised times two

## 2019-02-14 NOTE — SUBJECTIVE & OBJECTIVE
Past Medical History:   Diagnosis Date    Arthritis     Coronary artery disease     Diabetes mellitus     Hyperlipemia     Hypertension        Past Surgical History:   Procedure Laterality Date    ESOPHAGOGASTRODUODENOSCOPY (EGD) N/A 3/9/2016    Performed by [facility] at [facility] ENDO    EYE SURGERY      VASCULAR SURGERY         Review of patient's allergies indicates:  No Known Allergies    No current facility-administered medications on file prior to encounter.      Current Outpatient Medications on File Prior to Encounter   Medication Sig    amlodipine (NORVASC) 10 MG tablet Take 10 mg by mouth once daily.    aspirin (ECOTRIN) 81 MG EC tablet Take 1 tablet (81 mg total) by mouth once daily.    atorvastatin (LIPITOR) 80 MG tablet Take 80 mg by mouth every evening.    carvedilol (COREG) 12.5 MG tablet Take 1 tablet (12.5 mg total) by mouth 2 (two) times daily. (Patient taking differently: Take 25 mg by mouth 2 (two) times daily. )    glipiZIDE (GLUCOTROL) 10 MG tablet Take 10 mg by mouth 2 (two) times daily before meals.    isosorbide-hydrALAZINE 20-37.5 mg (BIDIL) 20-37.5 mg Tab Take 1 tablet by mouth 2 (two) times daily.    lansoprazole (PREVACID) 30 MG capsule Take 30 mg by mouth once daily.    lisinopril (PRINIVIL,ZESTRIL) 40 MG tablet Take 40 mg by mouth once daily.    losartan-hydrochlorothiazide 100-25 mg (HYZAAR) 100-25 mg per tablet Take 1 tablet by mouth once daily.    metformin (GLUCOPHAGE) 1000 MG tablet Take 1,000 mg by mouth 2 (two) times daily with meals.    pantoprazole (PROTONIX) 40 MG tablet Take 1 tablet (40 mg total) by mouth 2 (two) times daily.    potassium chloride (MICRO-K) 10 MEQ CpSR Take 20 mEq by mouth once.    rosuvastatin (CRESTOR) 40 MG Tab Take 10 mg by mouth once daily.     Family History     Problem Relation (Age of Onset)    Diabetes Mother, Father, Sister    Hypertension Mother, Father, Sister        Tobacco Use    Smoking status: Never Smoker     Smokeless tobacco: Never Used   Substance and Sexual Activity    Alcohol use: Yes     Alcohol/week: 2.4 oz     Types: 4 Cans of beer per week    Drug use: No    Sexual activity: Not on file     Review of Systems   Constitutional: Negative for activity change and diaphoresis.   HENT: Negative for congestion.    Respiratory: Negative for chest tightness and shortness of breath.    Cardiovascular: Negative for chest pain and palpitations.   Gastrointestinal: Negative for abdominal distention, blood in stool, constipation, nausea and vomiting.   Genitourinary: Negative for difficulty urinating and flank pain.   Musculoskeletal: Negative for arthralgias.   Skin: Negative for color change and pallor.   Psychiatric/Behavioral: Positive for confusion. Negative for agitation and behavioral problems.     Objective:     Vital Signs (Most Recent):  Temp: 98.3 °F (36.8 °C) (02/14/19 1132)  Pulse: 75 (02/14/19 1132)  Resp: 18 (02/14/19 1132)  BP: (!) 162/91 (02/14/19 1132)  SpO2: 95 % (02/14/19 1132) Vital Signs (24h Range):  Temp:  [94.5 °F (34.7 °C)-98.3 °F (36.8 °C)] 98.3 °F (36.8 °C)  Pulse:  [60-75] 75  Resp:  [16-20] 18  SpO2:  [94 %-100 %] 95 %  BP: (157-189)/(87-99) 162/91     Weight: 95.3 kg (210 lb)  Body mass index is 29.29 kg/m².    Physical Exam   Constitutional: He is oriented to person, place, and time. He appears well-developed and well-nourished.   HENT:   Head: Normocephalic and atraumatic.   Cardiovascular: Normal rate. Exam reveals no gallop and no friction rub.   Pulmonary/Chest: Effort normal and breath sounds normal.   Abdominal: Soft. He exhibits no distension. There is no rebound and no guarding.   Neurological: He is alert and oriented to person, place, and time.   Skin: Skin is warm and dry.   Psychiatric: He has a normal mood and affect. His behavior is normal.   Vitals reviewed.          Significant Labs:   BMP:   Recent Labs   Lab 02/14/19  0645   *      K 2.4*   CL 96   CO2 31*   BUN  24*   CREATININE 2.2*   CALCIUM 9.2     CBC:   Recent Labs   Lab 02/14/19  0645   WBC 6.63   HGB 12.7*   HCT 36.3*          Significant Imaging:

## 2019-02-14 NOTE — ED TRIAGE NOTES
Pt from home found by EMS w/ low blood sugar, ams and was inconstant of urine. IV's started [place] and d-50 given prior to arrival

## 2019-02-14 NOTE — ED PROVIDER NOTES
Encounter Date: 2/14/2019    SCRIBE #1 NOTE: I, Geronimo Navarro , am scribing for, and in the presence of,  Martin Hopper MD . I have scribed the following portions of the note - Other sections scribed: HPI, ROS .       History     Chief Complaint   Patient presents with    Hypoglycemia     pt called 911 and was not answering dispatch so EMS arrived to pts house and found him lethargic with a cbg of 43, given oral glucose, 1 amp, and d5 gtt, upon re check cbg 47, pt incontinent of urine, pt is awake but slow to respond upon ED arrival     CC: Hypoglycemia     HPI: 69 y/o M who has a past medical history of Arthritis, Coronary artery disease, Diabetes mellitus, Hyperlipemia, and Hypertension presents to the ED via EMS for emergent evaluation of hypoglycemia. According to EMS, pt was found at home with low sugar and was inconstant of urine. EMS gave d-50 prior to arrival. Pt complains of dizziness, weakness, and diaphoresis. Pt takes 55units of insulin 2x/day, once in the morning and once a night. Pt took his insulin last night but states he ate less than he normal. He believes this to be a cause of his hypoglycemia. No past surgeries. Pt is a nonsmoker. Pt is a nondrinker. No known allergies. Pt has not other complaints at this time.          The history is provided by the patient. No  was used.     Review of patient's allergies indicates:  No Known Allergies  Past Medical History:   Diagnosis Date    Arthritis     Coronary artery disease     Diabetes mellitus     Hyperlipemia     Hypertension      Past Surgical History:   Procedure Laterality Date    ESOPHAGOGASTRODUODENOSCOPY (EGD) N/A 3/9/2016    Performed by Andrea Nascimento MD at [facility] ENDO    EYE SURGERY      VASCULAR SURGERY       Family History   Problem Relation Age of Onset    Hypertension Mother     Diabetes Mother     Diabetes Father     Hypertension Father     Diabetes Sister     Hypertension Sister      Social History      Tobacco Use    Smoking status: Never Smoker    Smokeless tobacco: Never Used   Substance Use Topics    Alcohol use: Yes     Alcohol/week: 2.4 oz     Types: 4 Cans of beer per week    Drug use: No     Review of Systems   Constitutional: Positive for diaphoresis. Negative for appetite change and fever.   HENT: Negative for rhinorrhea and sore throat.    Eyes: Negative for visual disturbance.   Respiratory: Negative for cough and shortness of breath.    Cardiovascular: Negative for chest pain.   Gastrointestinal: Negative for abdominal pain.   Genitourinary: Negative for dysuria.   Musculoskeletal: Negative for gait problem.   Skin: Negative for rash.   Neurological: Positive for dizziness and weakness. Negative for syncope.       Physical Exam     Initial Vitals [02/14/19 0640]   BP Pulse Resp Temp SpO2   (!) 189/96 62 16 (!) 94.5 °F (34.7 °C) (!) 94 %      MAP       --         Physical Exam  The patient was examined specifically for the following:   General:No significant distress, Good color, Warm and dry. Head and neck:Scalp atraumatic, Neck supple. Neurological:Appropriate conversation, Gross motor deficits. Eyes:Conjugate gaze, Clear corneas. ENT: No epistaxis. Cardiac: Regular rate and rhythm, Grossly normal heart tones. Pulmonary: Wheezing, Rales. Gastrointestinal: Abdominal tenderness, Abdominal distention. Musculoskeletal: Extremity deformity, Apparent pain with range of motion of the joints. Skin: Rash.   The findings on examination were normal except for the following:  Patient is cool to touch.  The lungs are clear.  The heart tones are normal. The abdomen is soft.  Extremities are nontender.  There is no pain with range of motion of any joints.  The patient has no rash. The patient is alert and conversational.  Cranial nerves motor and sensory examination are essentially unremarkable.  ED Course   Critical Care  Date/Time: 2/14/2019 8:56 AM  Performed by: Martin Hopper MD  Authorized by: Martin  ZOEY Hopper MD   Direct patient critical care time: 22 minutes  Additional history critical care time: 11 minutes  Ordering / reviewing critical care time: 11 minutes  Documentation critical care time: 17 minutes  Consulting other physicians critical care time: 5 minutes  Consult with family critical care time: 5 minutes  Total critical care time (exclusive of procedural time) : 71 minutes  Critical care time was exclusive of separately billable procedures and treating other patients.  Critical care was necessary to treat or prevent imminent or life-threatening deterioration of the following conditions: metabolic crisis and CNS failure or compromise.  Critical care was time spent personally by me on the following activities: evaluation of patient's response to treatment, obtaining history from patient or surrogate, ordering and review of laboratory studies, pulse oximetry, review of old charts, development of treatment plan with patient or surrogate, discussions with primary provider, examination of patient, ordering and performing treatments and interventions, ordering and review of radiographic studies and re-evaluation of patient's condition.        Labs Reviewed   COMPREHENSIVE METABOLIC PANEL - Abnormal; Notable for the following components:       Result Value    Potassium 2.4 (*)     CO2 31 (*)     Glucose 150 (*)     BUN, Bld 24 (*)     Creatinine 2.2 (*)     eGFR if  34 (*)     eGFR if non  30 (*)     All other components within normal limits    Narrative:      Potassium  critical result(s) called and verbal readback obtained   from Kristel Huber., 02/14/2019 08:23   CBC W/ AUTO DIFFERENTIAL - Abnormal; Notable for the following components:    RBC 4.38 (*)     Hemoglobin 12.7 (*)     Hematocrit 36.3 (*)     MPV 9.0 (*)     Lymph # 0.9 (*)     Lymph% 12.8 (*)     All other components within normal limits   URINALYSIS, REFLEX TO URINE CULTURE - Abnormal; Notable for the following  components:    Color, UA Colorless (*)     Protein, UA 1+ (*)     Occult Blood UA 2+ (*)     All other components within normal limits    Narrative:     Preferred Collection Type->Urine, Clean Catch   URINALYSIS MICROSCOPIC - Abnormal; Notable for the following components:    RBC, UA 10 (*)     All other components within normal limits    Narrative:     Preferred Collection Type->Urine, Clean Catch   LACTIC ACID, PLASMA          Imaging Results    None       Medical decision making:  Given the above, this patient presents to the emergency room after being found lethargic in the field with a blood sugar of 43.  The patient was treated with sugar.  His Accu-Chek edi to above 100.  He was noted to be hypothermic.  He was treated with a warming blanket he remains hypothermic.  His potassium is 2.4.  We will treat him with oral potassium.  He will be admitted to the floor for recovery.  We considered sepsis but is lactate is normal.  The patient does have new acute renal insufficiency with a creatinine of 2.2.  This value was previously 1.2.  It is almost doubled.  I will admit the patient for further evaluation by Hospital Medicine.  I discussed the case at length with Dr. Alisa Montero Attestation:   Scribe #1: I performed the above scribed service and the documentation accurately describes the services I performed. I attest to the accuracy of the note.    Attending Attestation:           Physician Attestation for Scribe:  Physician Attestation Statement for Markibe #1: I, Martin Hopper MD , reviewed documentation, as scribed by Geronimo Navarro  in my presence, and it is both accurate and complete.                    Clinical Impression:   The primary encounter diagnosis was Hypoglycemia. Diagnoses of Altered mental status, unspecified altered mental status type, Acute renal insufficiency, Hypokalemia, and Hypothermia, initial encounter were also pertinent to this visit.                             Martin  ZOEY Hopper MD  02/14/19 0857

## 2019-02-14 NOTE — HOSPITAL COURSE
Mr. Bowie is a 69 yo M who presented to the ED today after EMS found the patient at home with a blood sugar of 45. He was given D50 in the field and was brought to the ED. Initially somewhat incoherent, by the time of my evaluation, he was completely back to his baseline.  He states that he took too much of his AM Insulin- 60 units instead of 50 long acting.  The patient of note is also on a sulfonylurea.  The patient was found to have some renal insufficiency that improved with IV fluids. The rest of the hospital stay was unremarkable.  To be on the safe side, sulfonylurea will be discontinued. A1c is 7.9.  Patient will be discharged to home today. Activity as tolerated. Diet- low NA ada 2000 juan j diet. Follow up with PCP in one week

## 2019-02-15 VITALS
DIASTOLIC BLOOD PRESSURE: 65 MMHG | HEIGHT: 71 IN | BODY MASS INDEX: 29.94 KG/M2 | HEART RATE: 75 BPM | OXYGEN SATURATION: 97 % | WEIGHT: 213.88 LBS | RESPIRATION RATE: 17 BRPM | SYSTOLIC BLOOD PRESSURE: 139 MMHG | TEMPERATURE: 98 F

## 2019-02-15 PROBLEM — E87.6 HYPOKALEMIA: Status: RESOLVED | Noted: 2019-02-14 | Resolved: 2019-02-15

## 2019-02-15 PROBLEM — N19 RENAL FAILURE: Status: RESOLVED | Noted: 2019-02-14 | Resolved: 2019-02-15

## 2019-02-15 PROBLEM — T68.XXXA HYPOTHERMIA: Status: RESOLVED | Noted: 2019-02-14 | Resolved: 2019-02-15

## 2019-02-15 PROBLEM — E16.2 HYPOGLYCEMIA: Status: RESOLVED | Noted: 2019-02-14 | Resolved: 2019-02-15

## 2019-02-15 LAB
ANION GAP SERPL CALC-SCNC: 11 MMOL/L
BUN SERPL-MCNC: 26 MG/DL
CALCIUM SERPL-MCNC: 8.3 MG/DL
CHLORIDE SERPL-SCNC: 101 MMOL/L
CO2 SERPL-SCNC: 26 MMOL/L
CREAT SERPL-MCNC: 1.8 MG/DL
EST. GFR  (AFRICAN AMERICAN): 44 ML/MIN/1.73 M^2
EST. GFR  (NON AFRICAN AMERICAN): 38 ML/MIN/1.73 M^2
GLUCOSE SERPL-MCNC: 170 MG/DL
POCT GLUCOSE: 162 MG/DL (ref 70–110)
POCT GLUCOSE: 205 MG/DL (ref 70–110)
POTASSIUM SERPL-SCNC: 3.2 MMOL/L
SODIUM SERPL-SCNC: 138 MMOL/L

## 2019-02-15 PROCEDURE — 25000003 PHARM REV CODE 250: Performed by: EMERGENCY MEDICINE

## 2019-02-15 PROCEDURE — 80048 BASIC METABOLIC PNL TOTAL CA: CPT

## 2019-02-15 PROCEDURE — 36415 COLL VENOUS BLD VENIPUNCTURE: CPT

## 2019-02-15 PROCEDURE — 25000003 PHARM REV CODE 250: Performed by: INTERNAL MEDICINE

## 2019-02-15 RX ORDER — ENOXAPARIN SODIUM 100 MG/ML
40 INJECTION SUBCUTANEOUS EVERY 24 HOURS
Status: DISCONTINUED | OUTPATIENT
Start: 2019-02-15 | End: 2019-02-15 | Stop reason: HOSPADM

## 2019-02-15 RX ADMIN — CARVEDILOL 12.5 MG: 6.25 TABLET, FILM COATED ORAL at 10:02

## 2019-02-15 RX ADMIN — HYDROCODONE BITARTRATE AND ACETAMINOPHEN 1 TABLET: 5; 325 TABLET ORAL at 05:02

## 2019-02-15 RX ADMIN — AMLODIPINE BESYLATE 10 MG: 5 TABLET ORAL at 10:02

## 2019-02-15 RX ADMIN — SODIUM CHLORIDE 1000 ML: 0.9 INJECTION, SOLUTION INTRAVENOUS at 05:02

## 2019-02-15 RX ADMIN — HYDRALAZINE HYDROCHLORIDE AND ISOSORBIDE DINITRATE 1 TABLET: 37.5; 2 TABLET, FILM COATED ORAL at 10:02

## 2019-02-15 RX ADMIN — POTASSIUM CHLORIDE 40 MEQ: 1.5 SOLUTION ORAL at 10:02

## 2019-02-15 RX ADMIN — PANTOPRAZOLE SODIUM 40 MG: 40 TABLET, DELAYED RELEASE ORAL at 10:02

## 2019-02-15 RX ADMIN — ASPIRIN 81 MG: 81 TABLET, COATED ORAL at 10:02

## 2019-02-15 RX ADMIN — LOSARTAN POTASSIUM AND HYDROCHLOROTHIAZIDE 2 TABLET: 12.5; 5 TABLET ORAL at 10:02

## 2019-02-15 NOTE — PROGRESS NOTES
Hypoglycemia (Low Blood Sugar)     Fast-acting sugar includes a cup of nonfat milk.     Too little sugar (glucose) in your blood is called hypoglycemia or low blood sugar. Low blood sugar usually means anything lower than 70 mg/dL. Talk with your healthcare provider about your target range and what level is too low for you. Diabetes itself doesnt cause low blood sugar. But some of the treatments for diabetes, such as pills or insulin, may raise your risk for it. Low blood sugar may cause you to pass out or have a seizure. So always treat low blood sugar right away, but don't overeat.  Special note: Always carry a source of fast-acting sugar and a snack in case of hypoglycemia.   What you may notice  If you have low blood sugar, you may have one or more of these symptoms:  · Shakiness or dizziness  · Cold, clammy skin or sweating  · Feelings of hunger  · Headache  · Nervousness  · A hard, fast heartbeat  · Weakness  · Confusion or irritability  · Blurred vision  · Having nightmares or waking up confused or sweating  · Numbness or tingling in the lips or tongue  What you should do  Here are tips to follow if you have hypoglycemia:   · First check your blood sugar. If it is too low (out of your target range), eat or drink 15 to 20 grams of fast-acting sugar. This may be 3 to 4 glucose tablets, 4 ounces (half a cup) of fruit juice or regular (nondiet) soda, 8 ounces (1 cup) of fat-free milk, or 1 tablespoon of honey. Dont take more than this, or your blood sugar may go too high.  · Wait 15 minutes. Then recheck your blood sugar if you can.  · If your blood sugar is still too low, repeat the steps above and check your blood sugar again. If your blood sugar still has not returned to your target range, contact your healthcare provider or seek emergency care.  · Once your blood sugar returns to target range, eat a snack or meal.  Preventing low blood sugar  Things you can do include the following:   · If your condition  needs a strict treatment plan, eat your meals and snacks at the same times each day. Dont skip meals!  · If your treatment plan lets you change when you eat and what you eat, learn how to change the time and dose of your rapid-acting insulin to match this.   · Ask your healthcare provider if it is safe for you to drink alcohol. Never drink on an empty stomach.  · Take your medicine at the prescribed times.  · Always carry a source of fast-acting sugar and a snack when youre away from home.  Other things to do  Additional tips include the following:  · Carry a medical ID card, a compact USB drive, or wear a medical alert bracelet or necklace. It should say that you have diabetes. It should also say what to do if you pass out or have a seizure.  · Make sure your family, friends, and coworkers know the signs of low blood sugar. Tell them what to do if your blood sugar falls very low and you cant treat yourself.  · Keep a glucagon emergency kit handy. Be sure your family, friends, and coworkers know how and when to use it. Check it regularly and replace the glucagon before it expires.  · Talk with your health care team about other things you can do to prevent low blood sugar.

## 2019-02-15 NOTE — PLAN OF CARE
Problem: Fall Injury Risk  Goal: Absence of Fall and Fall-Related Injury  Outcome: Ongoing (interventions implemented as appropriate)  Monitored. Safety precautions maintained. Bed alarm on, call bell in reach.

## 2019-02-15 NOTE — ASSESSMENT & PLAN NOTE
This was likely secondary to patient taking too much of his insulin this am. Normally has good control of sugars. No previous hypoglycemic episodes in the past. Will check an A1C.  He initially presented with AMS from low sugars- this has resolved and the patient is at his baseline now.  A contributing factor could also be decreased GFR to 30. Of note- 10/17 the patient had baseline normal renal function.  Will monitor overnight and likely send home in am.  Resolved.     Will d/c to home. A1c 7.9. Will hold sulfonylurea.

## 2019-02-15 NOTE — PLAN OF CARE
"TN reviewed follow up appointment information as well as  "Hypoglycemia discharge instructions" handout with patient using teach back. Patient stated he will notify the doctor if he has dizziness, confusion, and vision problems. Patient is in agreement and verbalized an understanding. Placed discharge information in blue discharge folder. TN also reviewed patient responsibility checklist with him using teach back. Patient was able to verbalize his responsibilities after discharge to manage his care at home bein. Going to follow up appointments   2. Picking up rx from the pharmacy when discharged  3. Taking his medications as prescribed     Patient informed that [facility] will contact him to arrange his home visits.    1142- Patient's nurse, Yari, informed that patient can discharge from  standpoint.        02/15/19 1135   Final Note   Assessment Type Final Discharge Note   Anticipated Discharge Disposition Home   What phone number can be called within the next 1-3 days to see how you are doing after discharge? ([phone])   Hospital Follow Up  Appt(s) scheduled? Yes   Discharge plans and expectations educations in teach back method with documentation complete? Yes   Right Care Referral Info   Post Acute Recommendation Home-care   Facility Name ([facility])       "

## 2019-02-15 NOTE — PLAN OF CARE
[facility]    HOME HEALTH ORDERS  FACE TO FACE ENCOUNTER    Patient Name: Chato Bowie  YOB: 1950    PCP: Irlanda Valenzuela   PCP Address: [address]  PCP Phone Number: [phone]  PCP Fax: [phone]    Encounter Date: 02/15/2019    Admit to Home Health    Diagnoses: hypoglycemia     Active Hospital Problems    Diagnosis  POA    Type 2 diabetes mellitus, uncontrolled [E11.65]  Yes     Priority: 2      Chronic    Alcohol abuse [F10.10]  Yes    Anemia of chronic disease [D63.8]  Yes     Chronic    Essential hypertension [I10]  Yes     Chronic    GERD (gastroesophageal reflux disease) [K21.9]  Yes     Chronic    Hyperlipidemia [E78.5]  Yes     Chronic    Mild protein malnutrition [E44.1]  Yes     Chronic    CAD (coronary artery disease) [I25.10]  Yes     Chronic      Resolved Hospital Problems    Diagnosis Date Resolved POA    *Hypoglycemia [E16.2] 02/15/2019 Yes     Priority: 1 - High    Hypothermia [T68.XXXA] 02/15/2019 Yes    Hypokalemia [E87.6] 02/15/2019 Yes    Renal failure [N19] 02/15/2019 Yes       No future appointments.  Follow-up Information     [facility] In 1 week.    Contact information:  [address]  [phone]                     I have seen and examined this patient face to face today. My clinical findings that support the need for the home health skilled services and home bound status are the following:  Patient with medication mismanagement issues requiring home bound status as evidenced by  Poor adherence to medication regimen/dosage.    Allergies:Review of patient's allergies indicates:  No Known Allergies    Diet: diabetic diet: 2000 calorie and 2 gram sodium diet    Activities: activity as tolerated    Nursing:   SN to complete comprehensive assessment including routine vital signs. Instruct on disease process and s/s of complications to report to MD. Review/verify medication list sent  home with the patient at time of discharge  and instruct patient/caregiver as needed. Frequency may be adjusted depending on start of care date.    Notify MD if SBP > 160 or < 90; DBP > 90 or < 50; HR > 120 or < 50; Temp > 101; Other:        CONSULTS:    Physical Therapy to evaluate and treat. Evaluate for home safety and equipment needs; Establish/upgrade home exercise program. Perform / instruct on therapeutic exercises, gait training, transfer training, and Range of Motion.  Occupational Therapy to evaluate and treat. Evaluate home environment for safety and equipment needs. Perform/Instruct on transfers, ADL training, ROM, and therapeutic exercises.  Aide to provide assistance with personal care, ADLs, and vital signs.    MISCELLANEOUS CARE:      WOUND CARE ORDERS        Medications: Review discharge medications with patient and family and provide education.      Current Discharge Medication List      CONTINUE these medications which have NOT CHANGED    Details   amlodipine (NORVASC) 10 MG tablet Take 10 mg by mouth once daily.      aspirin (ECOTRIN) 81 MG EC tablet Take 1 tablet (81 mg total) by mouth once daily.  Refills: 0      atorvastatin (LIPITOR) 80 MG tablet Take 80 mg by mouth every evening.      carvedilol (COREG) 12.5 MG tablet Take 1 tablet (12.5 mg total) by mouth 2 (two) times daily.  Qty: 60 tablet, Refills: 0      isosorbide-hydrALAZINE 20-37.5 mg (BIDIL) 20-37.5 mg Tab Take 1 tablet by mouth 2 (two) times daily.  Qty: 60 tablet, Refills: 0      lansoprazole (PREVACID) 30 MG capsule Take 30 mg by mouth once daily.      lisinopril (PRINIVIL,ZESTRIL) 40 MG tablet Take 40 mg by mouth once daily.      losartan-hydrochlorothiazide 100-25 mg (HYZAAR) 100-25 mg per tablet Take 1 tablet by mouth once daily.      metformin (GLUCOPHAGE) 1000 MG tablet Take 1,000 mg by mouth 2 (two) times daily with meals.      pantoprazole (PROTONIX) 40 MG tablet Take 1 tablet (40 mg total) by mouth 2 (two) times  daily.  Qty: 60 tablet, Refills: 1      potassium chloride (MICRO-K) 10 MEQ CpSR Take 20 mEq by mouth once.      rosuvastatin (CRESTOR) 40 MG Tab Take 10 mg by mouth once daily.         STOP taking these medications       glipiZIDE (GLUCOTROL) 10 MG tablet Comments:   Reason for Stopping:               I certify that this patient is confined to his home and needs intermittent skilled nursing care, physical therapy and occupational therapy.

## 2019-02-15 NOTE — DISCHARGE SUMMARY
[facility] Hospital Medicine  Discharge Summary      Patient Name: Chato Bowie  MRN: [id number]  Admission Date: 2/14/2019  Hospital Length of Stay: 1 days  Discharge Date and Time:  02/15/2019 9:35 AM  Attending Physician: Raymond Cuellar MD   Discharging Provider: Raymond Cuellar MD  Primary Care Provider: [facility]      HPI:   Mr. Bowie is a 67 yo M who presented to the ED today after EMS found the patient at home with a blood sugar of 45. He was given D50 in the field and was brought to the ED. Initially somewhat incoherent, by the time of my evaluation, he was completely back to his baseline.  He states that he took too much of his AM Insulin- 60 units instead of 50 long acting.  He notes that prior to this, he has had good control of his sugars and has not had an episode like this in the past.     * No surgery found *      Hospital Course:   Mr. Bowie is a 67 yo M who presented to the ED today after EMS found the patient at home with a blood sugar of 45. He was given D50 in the field and was brought to the ED. Initially somewhat incoherent, by the time of my evaluation, he was completely back to his baseline.  He states that he took too much of his AM Insulin- 60 units instead of 50 long acting.  The patient of note is also on a sulfonylurea.  The patient was found to have some renal insufficiency that improved with IV fluids. The rest of the hospital stay was unremarkable.  To be on the safe side, sulfonylurea will be discontinued. A1c is 7.9.  Patient will be discharged to home today. Activity as tolerated. Diet- low NA ada 2000 juan j diet. Follow up with PCP in one week     Also, H/H with an Nursing Aide will be arranged.        Consults:     No new Assessment & Plan notes have been filed under this hospital service since the last note was generated.  Service: Hospital Medicine    Final Active Diagnoses:    Diagnosis Date Noted POA    Type 2 diabetes mellitus,  uncontrolled [E11.65] 03/10/2017 Yes     Chronic    Alcohol abuse [F10.10] 02/14/2019 Yes    Anemia of chronic disease [D63.8] 03/10/2017 Yes     Chronic    Essential hypertension [I10] 03/10/2017 Yes     Chronic    GERD (gastroesophageal reflux disease) [K21.9] 03/10/2017 Yes     Chronic    Hyperlipidemia [E78.5] 03/10/2017 Yes     Chronic    Mild protein malnutrition [E44.1] 03/10/2017 Yes     Chronic    CAD (coronary artery disease) [I25.10] 03/08/2016 Yes     Chronic      Problems Resolved During this Admission:    Diagnosis Date Noted Date Resolved POA    PRINCIPAL PROBLEM:  Hypoglycemia [E16.2] 02/14/2019 02/15/2019 Yes    Hypothermia [T68.XXXA] 02/14/2019 02/15/2019 Yes    Hypokalemia [E87.6] 02/14/2019 02/15/2019 Yes    Renal failure [N19] 02/14/2019 02/15/2019 Yes       Discharged Condition: good    Disposition:   To home   Follow Up:  Follow-up Information     [facility] In 1 week.    Contact information:  [address] [place] [address]  [place] [id number] [phone]                 Patient Instructions:   No discharge procedures on file.    Significant Diagnostic Studies:     Pending Diagnostic Studies:     None         Medications:  Reconciled Home Medications:      Medication List      CHANGE how you take these medications    carvedilol 12.5 MG tablet  Commonly known as:  COREG  Take 1 tablet (12.5 mg total) by mouth 2 (two) times daily.  What changed:  how much to take        CONTINUE taking these medications    amLODIPine 10 MG tablet  Commonly known as:  NORVASC  Take 10 mg by mouth once daily.     aspirin 81 MG EC tablet  Commonly known as:  ECOTRIN  Take 1 tablet (81 mg total) by mouth once daily.     atorvastatin 80 MG tablet  Commonly known as:  LIPITOR  Take 80 mg by mouth every evening.     isosorbide-hydrALAZINE 20-37.5 mg 20-37.5 mg Tab  Commonly known as:  BIDIL  Take 1 tablet by mouth 2 (two) times daily.     lansoprazole 30 MG capsule  Commonly known as:  PREVACID  Take 30 mg by mouth  once daily.     lisinopril 40 MG tablet  Commonly known as:  PRINIVIL,ZESTRIL  Take 40 mg by mouth once daily.     losartan-hydrochlorothiazide 100-25 mg 100-25 mg per tablet  Commonly known as:  HYZAAR  Take 1 tablet by mouth once daily.     metFORMIN 1000 MG tablet  Commonly known as:  GLUCOPHAGE  Take 1,000 mg by mouth 2 (two) times daily with meals.     pantoprazole 40 MG tablet  Commonly known as:  PROTONIX  Take 1 tablet (40 mg total) by mouth 2 (two) times daily.     potassium chloride 10 MEQ Cpsr  Commonly known as:  MICRO-K  Take 20 mEq by mouth once.     rosuvastatin 40 MG Tab  Commonly known as:  CRESTOR  Take 10 mg by mouth once daily.        STOP taking these medications    glipiZIDE 10 MG tablet  Commonly known as:  GLUCOTROL            Indwelling Lines/Drains at time of discharge:   Lines/Drains/Airways          None          Time spent on the discharge of patient:  < 30  minutes  Patient was seen and examined on the date of discharge and determined to be suitable for discharge.         Raymond Mccormack MD  Department of Hospital Medicine  [facility]

## 2019-02-15 NOTE — NURSING
Patient oriented to room. Blue folder and white board explained. Questions encouraged and answered. Patient verbalized understanding. Bed in low position and locked. Call light in reach. Bed alarm set. No distress noted. Will continue to monitor, will continue with plan of care.

## 2019-02-15 NOTE — ASSESSMENT & PLAN NOTE
See problem #1. WIll start IV fluids and check BMP in am.   Improved. Suspect that patient has renal manifestations of DM with CKD III

## 2019-02-15 NOTE — PROGRESS NOTES
TN sent patient's clinicals (Packet, MD notes, MAR, POC) to [facility] for resumption of care. Awaiting feedback.

## 2019-02-15 NOTE — PROGRESS NOTES
[facility] Hospital Medicine  Progress Note    Patient Name: Chato Bowie  MRN: [id number]  Patient Class: IP- Inpatient   Admission Date: 2/14/2019  Length of Stay: 1 days  Attending Physician: Raymond Cuellar MD  Primary Care Provider: [facility]        Subjective:     Principal Problem:Hypoglycemia    HPI:  Mr. Bowie is a 69 yo M who presented to the ED today after EMS found the patient at home with a blood sugar of 45. He was given D50 in the field and was brought to the ED. Initially somewhat incoherent, by the time of my evaluation, he was completely back to his baseline.  He states that he took too much of his AM Insulin- 60 units instead of 50 long acting.  He notes that prior to this, he has had good control of his sugars and has not had an episode like this in the past.     Hospital Course:  Mr. Bowie is a 69 yo M who presented to the ED today after EMS found the patient at home with a blood sugar of 45. He was given D50 in the field and was brought to the ED. Initially somewhat incoherent, by the time of my evaluation, he was completely back to his baseline.  He states that he took too much of his AM Insulin- 60 units instead of 50 long acting.  The patient of note is also on a sulfonylurea.  The patient was found to have some renal insufficiency that improved with IV fluids. The rest of the hospital stay was unremarkable.  To be on the safe side, sulfonylurea will be discontinued. A1c is 7.9.  Patient will be discharged to home today. Activity as tolerated. Diet- low NA ada 2000 juan j diet. Follow up with PCP in one week       Interval History: No new issues. Would like to be discharged.       Review of Systems   Constitutional: Negative for activity change and diaphoresis.   HENT: Negative for congestion.    Respiratory: Negative for chest tightness and shortness of breath.    Cardiovascular: Negative for chest pain and palpitations.   Gastrointestinal: Negative for  abdominal distention, blood in stool, constipation, nausea and vomiting.   Genitourinary: Negative for difficulty urinating and flank pain.   Musculoskeletal: Negative for arthralgias.   Skin: Negative for color change and pallor.   Psychiatric/Behavioral: Negative for agitation, behavioral problems and confusion.     Objective:     Vital Signs (Most Recent):  Temp: 98.1 °F (36.7 °C) (02/15/19 0731)  Pulse: 68 (02/15/19 0731)  Resp: 17 (02/15/19 0731)  BP: (!) 152/72 (02/15/19 0731)  SpO2: 96 % (02/15/19 0731) Vital Signs (24h Range):  Temp:  [98.1 °F (36.7 °C)-98.7 °F (37.1 °C)] 98.1 °F (36.7 °C)  Pulse:  [67-80] 68  Resp:  [17-20] 17  SpO2:  [95 %-97 %] 96 %  BP: (136-162)/(71-94) 152/72     Weight: 97 kg (213 lb 13.5 oz)  Body mass index is 29.83 kg/m².    Intake/Output Summary (Last 24 hours) at 2/15/2019 0931  Last data filed at 2/15/2019 0800  Gross per 24 hour   Intake 1460 ml   Output 300 ml   Net 1160 ml      Physical Exam   Constitutional: He is oriented to person, place, and time. He appears well-developed and well-nourished.   HENT:   Head: Normocephalic and atraumatic.   Cardiovascular: Normal rate. Exam reveals no gallop and no friction rub.   Pulmonary/Chest: Effort normal and breath sounds normal.   Abdominal: Soft. He exhibits no distension. There is no rebound and no guarding.   Neurological: He is alert and oriented to person, place, and time.   Skin: Skin is warm and dry.   Psychiatric: He has a normal mood and affect. His behavior is normal.   Vitals reviewed.      Significant Labs:   BMP:   Recent Labs   Lab 02/15/19  0344   *      K 3.2*      CO2 26   BUN 26*   CREATININE 1.8*   CALCIUM 8.3*     CBC:   Recent Labs   Lab 02/14/19  0645   WBC 6.63   HGB 12.7*   HCT 36.3*          Significant Imaging:    Assessment/Plan:      * Hypoglycemia    This was likely secondary to patient taking too much of his insulin this am. Normally has good control of sugars. No previous  hypoglycemic episodes in the past. Will check an A1C.  He initially presented with AMS from low sugars- this has resolved and the patient is at his baseline now.  A contributing factor could also be decreased GFR to 30. Of note- 10/17 the patient had baseline normal renal function.  Will monitor overnight and likely send home in am.  Resolved.     Will d/c to home. A1c 7.9. Will hold sulfonylurea.        Type 2 diabetes mellitus, uncontrolled    Uncontrolled with hypoglycemia. A1C   May have renal manifestations. Will know more by tomorrow.          Alcohol abuse    States he drinks 1/2 pint every other day or so.  Will monitor for DT's.  Give outpatient referral for ETOH abuse.          Renal failure    See problem #1. WIll start IV fluids and check BMP in am.   Improved. Suspect that patient has renal manifestations of DM with CKD III         Hypokalemia    Will replace.          Hypothermia    From hypoglycemia.  Resolved.          GERD (gastroesophageal reflux disease)    Resume home meds.          Mild protein malnutrition    Will discuss with patient        Anemia of chronic disease    No acute issues.          Hyperlipidemia    Resume home meds.        Essential hypertension    Resume home meds.          CAD (coronary artery disease)    Stable. No acute issues.            VTE Risk Mitigation (From admission, onward)        Ordered     enoxaparin injection 40 mg  Daily      02/15/19 0906     IP VTE LOW RISK PATIENT  Once      02/14/19 1204     Place MASOOD hose  Until discontinued      02/14/19 1204        D/c to home        Raymond Mccormack MD  Department of Hospital Medicine   [facility]

## 2019-02-15 NOTE — PROGRESS NOTES
WRITTEN HEALTHCARE DISCHARGE INFORMATION     Things that YOU are responsible for to Manage Your Care At Home:  1. Getting your prescriptions filled.  2. Taking you medications as directed. DO NOT MISS ANY DOSES!  3. Going to your follow-up doctor appointments. This is important because it allows the doctor to monitor your progress and to determine if any changes need to be made to your treatment plan.    If you are unable to make your follow up appointments, please call the number listed and reschedule this appointment.     After discharge, if you need assistance, you can call [facility] for 24/7 assistance at [phone]    Thank you for choosing [facility] and allowing us to care for you.   From your care manager: Joie RAMOS,TN @ [phone]     You should receive a call from [facility] within 48-72 hours to help manage your care after discharge. Please try to make sure that you answer your phone for this important phone call.     Follow-up Information     [facility] On 2/18/2019.    Why:  On Monday @ 8:10am, outpatient services  Contact information:  [place] [id number]  [phone]             [facility].    Specialties:  Home Health Services, Physical Therapy, Occupational Therapy  Why:  Nurse will call you to schedule your home visits  Contact information:  [address]  [address] [place] [address]  [phone]

## 2019-02-15 NOTE — PROGRESS NOTES
TN arranged transportation for patient to travel via cab. Approved by Shyann Archer. Nurse Yari funes.

## 2019-02-15 NOTE — SUBJECTIVE & OBJECTIVE
Interval History: No new issues. Would like to be discharged.       Review of Systems   Constitutional: Negative for activity change and diaphoresis.   HENT: Negative for congestion.    Respiratory: Negative for chest tightness and shortness of breath.    Cardiovascular: Negative for chest pain and palpitations.   Gastrointestinal: Negative for abdominal distention, blood in stool, constipation, nausea and vomiting.   Genitourinary: Negative for difficulty urinating and flank pain.   Musculoskeletal: Negative for arthralgias.   Skin: Negative for color change and pallor.   Psychiatric/Behavioral: Negative for agitation, behavioral problems and confusion.     Objective:     Vital Signs (Most Recent):  Temp: 98.1 °F (36.7 °C) (02/15/19 0731)  Pulse: 68 (02/15/19 0731)  Resp: 17 (02/15/19 0731)  BP: (!) 152/72 (02/15/19 0731)  SpO2: 96 % (02/15/19 0731) Vital Signs (24h Range):  Temp:  [98.1 °F (36.7 °C)-98.7 °F (37.1 °C)] 98.1 °F (36.7 °C)  Pulse:  [67-80] 68  Resp:  [17-20] 17  SpO2:  [95 %-97 %] 96 %  BP: (136-162)/(71-94) 152/72     Weight: 97 kg (213 lb 13.5 oz)  Body mass index is 29.83 kg/m².    Intake/Output Summary (Last 24 hours) at 2/15/2019 0931  Last data filed at 2/15/2019 0800  Gross per 24 hour   Intake 1460 ml   Output 300 ml   Net 1160 ml      Physical Exam   Constitutional: He is oriented to person, place, and time. He appears well-developed and well-nourished.   HENT:   Head: Normocephalic and atraumatic.   Cardiovascular: Normal rate. Exam reveals no gallop and no friction rub.   Pulmonary/Chest: Effort normal and breath sounds normal.   Abdominal: Soft. He exhibits no distension. There is no rebound and no guarding.   Neurological: He is alert and oriented to person, place, and time.   Skin: Skin is warm and dry.   Psychiatric: He has a normal mood and affect. His behavior is normal.   Vitals reviewed.      Significant Labs:   BMP:   Recent Labs   Lab 02/15/19  0344   *      K 3.2*       CO2 26   BUN 26*   CREATININE 1.8*   CALCIUM 8.3*     CBC:   Recent Labs   Lab 02/14/19  0645   WBC 6.63   HGB 12.7*   HCT 36.3*          Significant Imaging:

## 2019-02-15 NOTE — PLAN OF CARE
"TN met with patient at bedside to complete discharge needs assessment. TN explained duties of case management to patient.  TN reviewed "[facility] Packet", "Discharge Planning Begins on Admission"  and discussed "Help at Home".  Patient stated he lives alone however his sister Wendy visits him each day to assist. Patient has [facility]. Patient will discharge home with assistance from family and home health. TN also discussed his responsibilities to manage his  health at home. Patient was informed to leave folder at bedside during hospital stay. Contact information added to zeynep thomas.    [facility] Case Management        02/15/19 0148   Discharge Assessment   Assessment Type Discharge Planning Assessment   Assessment information obtained from? Patient   Prior to hospitilization cognitive status: Alert/Oriented   Prior to hospitalization functional status: Independent;Assistive Equipment   Current cognitive status: Alert/Oriented   Current Functional Status: Independent;Assistive Equipment   Lives With alone   Able to Return to Prior Arrangements yes   Is patient able to care for self after discharge? Yes   Who are your caregiver(s) and their phone number(s)? Wendy- sister [phone]   Patient's perception of discharge disposition home or selfcare;home health   Readmission Within the Last 30 Days no previous admission in last 30 days   Patient currently being followed by outpatient case management? Yes   If yes, name of outpatient case management following: ([facility])   Patient currently receives any other outside agency services? No   Equipment Currently Used at Home cane, straight   Do you have any problems affording any of your prescribed medications? No   Is the patient taking medications as prescribed? yes   Does the patient have transportation home? No   Does the patient receive services at the [facility]madin Clinic? No   Discharge Plan A Home;Home Health   Discharge Plan B Home Health;Home   Patient/Family " in Agreement with Plan yes

## 2020-01-02 ENCOUNTER — OFFICE VISIT (OUTPATIENT)
Dept: UROLOGY | Facility: CLINIC | Age: 70
End: 2020-01-02
Payer: MEDICARE

## 2020-01-02 VITALS
RESPIRATION RATE: 15 BRPM | WEIGHT: 214.5 LBS | HEIGHT: 71 IN | BODY MASS INDEX: 30.03 KG/M2 | HEART RATE: 68 BPM | DIASTOLIC BLOOD PRESSURE: 70 MMHG | SYSTOLIC BLOOD PRESSURE: 122 MMHG

## 2020-01-02 DIAGNOSIS — R35.0 FREQUENCY OF URINATION: ICD-10-CM

## 2020-01-02 DIAGNOSIS — N52.01 ERECTILE DYSFUNCTION DUE TO ARTERIAL INSUFFICIENCY: Primary | ICD-10-CM

## 2020-01-02 DIAGNOSIS — R35.1 NOCTURIA: ICD-10-CM

## 2020-01-02 DIAGNOSIS — N39.41 URGE INCONTINENCE: ICD-10-CM

## 2020-01-02 PROCEDURE — 1159F MED LIST DOCD IN RCRD: CPT | Mod: S$GLB,,, | Performed by: UROLOGY

## 2020-01-02 PROCEDURE — 3074F PR MOST RECENT SYSTOLIC BLOOD PRESSURE < 130 MM HG: ICD-10-PCS | Mod: CPTII,S$GLB,, | Performed by: UROLOGY

## 2020-01-02 PROCEDURE — 3078F DIAST BP <80 MM HG: CPT | Mod: CPTII,S$GLB,, | Performed by: UROLOGY

## 2020-01-02 PROCEDURE — 1126F AMNT PAIN NOTED NONE PRSNT: CPT | Mod: S$GLB,,, | Performed by: UROLOGY

## 2020-01-02 PROCEDURE — 99999 PR PBB SHADOW E&M-EST. PATIENT-LVL III: CPT | Mod: PBBFAC,,, | Performed by: UROLOGY

## 2020-01-02 PROCEDURE — 1159F PR MEDICATION LIST DOCUMENTED IN MEDICAL RECORD: ICD-10-PCS | Mod: S$GLB,,, | Performed by: UROLOGY

## 2020-01-02 PROCEDURE — 1101F PT FALLS ASSESS-DOCD LE1/YR: CPT | Mod: CPTII,S$GLB,, | Performed by: UROLOGY

## 2020-01-02 PROCEDURE — 3074F SYST BP LT 130 MM HG: CPT | Mod: CPTII,S$GLB,, | Performed by: UROLOGY

## 2020-01-02 PROCEDURE — 1101F PR PT FALLS ASSESS DOC 0-1 FALLS W/OUT INJ PAST YR: ICD-10-PCS | Mod: CPTII,S$GLB,, | Performed by: UROLOGY

## 2020-01-02 PROCEDURE — 1126F PR PAIN SEVERITY QUANTIFIED, NO PAIN PRESENT: ICD-10-PCS | Mod: S$GLB,,, | Performed by: UROLOGY

## 2020-01-02 PROCEDURE — 99204 OFFICE O/P NEW MOD 45 MIN: CPT | Mod: S$GLB,,, | Performed by: UROLOGY

## 2020-01-02 PROCEDURE — 99204 PR OFFICE/OUTPT VISIT, NEW, LEVL IV, 45-59 MIN: ICD-10-PCS | Mod: S$GLB,,, | Performed by: UROLOGY

## 2020-01-02 PROCEDURE — 99999 PR PBB SHADOW E&M-EST. PATIENT-LVL III: ICD-10-PCS | Mod: PBBFAC,,, | Performed by: UROLOGY

## 2020-01-02 PROCEDURE — 3078F PR MOST RECENT DIASTOLIC BLOOD PRESSURE < 80 MM HG: ICD-10-PCS | Mod: CPTII,S$GLB,, | Performed by: UROLOGY

## 2020-01-02 RX ORDER — ATROPINE SULFATE 10 MG/ML
SOLUTION/ DROPS OPHTHALMIC
Status: ON HOLD | COMMUNITY
Start: 2019-11-01 | End: 2020-12-10 | Stop reason: HOSPADM

## 2020-01-02 RX ORDER — TAMSULOSIN HYDROCHLORIDE 0.4 MG/1
0.4 CAPSULE ORAL DAILY
Qty: 30 CAPSULE | Refills: 11 | Status: SHIPPED | OUTPATIENT
Start: 2020-01-02 | End: 2023-11-27

## 2020-01-02 RX ORDER — TERBINAFINE HYDROCHLORIDE 250 MG/1
TABLET ORAL
Status: ON HOLD | COMMUNITY
Start: 2019-12-23 | End: 2020-12-10 | Stop reason: HOSPADM

## 2020-01-02 RX ORDER — INSULIN ASPART 100 [IU]/ML
INJECTION, SUSPENSION SUBCUTANEOUS
Status: ON HOLD | COMMUNITY
Start: 2019-12-07 | End: 2020-11-27 | Stop reason: SDUPTHER

## 2020-01-02 RX ORDER — FENOFIBRATE 160 MG/1
160 TABLET ORAL
Status: ON HOLD | COMMUNITY
Start: 2015-04-29 | End: 2020-12-10 | Stop reason: HOSPADM

## 2020-01-02 RX ORDER — LOSARTAN POTASSIUM 100 MG/1
TABLET ORAL
COMMUNITY
Start: 2019-10-31 | End: 2020-01-02 | Stop reason: ALTCHOICE

## 2020-01-02 RX ORDER — FOLIC ACID 1 MG/1
1 TABLET ORAL
Status: ON HOLD | COMMUNITY
Start: 2015-04-29 | End: 2020-12-10 | Stop reason: HOSPADM

## 2020-01-02 RX ORDER — SILDENAFIL 100 MG/1
100 TABLET, FILM COATED ORAL DAILY PRN
Qty: 30 TABLET | Refills: 11 | Status: ON HOLD | OUTPATIENT
Start: 2020-01-02 | End: 2021-06-12

## 2020-01-02 RX ORDER — SYRINGE,SAFETY WITH NEEDLE,1ML 25GX1"
SYRINGE (EA) MISCELLANEOUS
Status: ON HOLD | COMMUNITY
Start: 2019-12-13 | End: 2020-12-11 | Stop reason: HOSPADM

## 2020-01-02 RX ORDER — DUREZOL 0.5 MG/ML
EMULSION OPHTHALMIC
COMMUNITY
Start: 2019-11-01 | End: 2023-01-05

## 2020-01-02 RX ORDER — LANOLIN ALCOHOL/MO/W.PET/CERES
800 CREAM (GRAM) TOPICAL
COMMUNITY
Start: 2015-04-29 | End: 2023-09-19

## 2020-01-02 RX ORDER — FUROSEMIDE 20 MG/1
TABLET ORAL
Status: ON HOLD | COMMUNITY
Start: 2019-10-31 | End: 2020-03-25 | Stop reason: HOSPADM

## 2020-01-02 NOTE — PROGRESS NOTES
"Subjective:       Chato Bowie is a 69 y.o. male who is a new patient who was referred by PCP for evaluation of ED.      Patient complains of erectile dysfunction. Onset of dysfunction was 4-5 months ago and was sudden in onset.  Patient states the nature of difficulty is both attaining and maintaining erection. Full erections occur never. Partial erections occur never. Libido is not affected. Risk factors for ED include +CAD, HTN, uncontrolled DM2, beta-blocker use. Previous treatment of ED includes Viagra 20mg - not helpful. He was not given a higher dose.     +isosorbide mononitrate listed on MAR but states he is no longer on this medication.      He also reports urinary frequency day and night. Occasional UUI. Tried prostate meds by report but stopped as they were not helpful. +Lasix. Significant glucosuria today.     Unknown if PSA checked by PCP.     PVR (bladder scan) today - 152cc      The following portions of the patient's history were reviewed and updated as appropriate: allergies, current medications, past family history, past medical history, past social history, past surgical history and problem list.    Review of Systems  Constitutional: no fever or chills  ENT: no nasal congestion or sore throat  Respiratory: no cough or shortness of breath  Cardiovascular: no chest pain or palpitations  Gastrointestinal: no nausea or vomiting, tolerating diet  Genitourinary: as per HPI  Hematologic/Lymphatic: no easy bruising or lymphadenopathy  Musculoskeletal: no arthralgias or myalgias  Skin: no rashes or lesions  Neurological: no seizures or tremors  Behavioral/Psych: no auditory or visual hallucinations       Objective:    Vitals: Pulse 68   Resp 15   Ht 5' 11" (1.803 m)   Wt 97.3 kg (214 lb 8.1 oz)   BMI 29.92 kg/m²     Physical Exam   General: well developed, well nourished in no acute distress  Head: normocephalic, atraumatic  Neck: supple, trachea midline, no obvious enlargement of thyroid  HEENT: " EOMI, mucus membranes moist, sclera anicteric, no hearing impairment  Lungs: symmetric expansion, non-labored breathing  Cardiovascular: regular rate and rhythm, normal pulses  Abdomen: soft, non tender, non distended, no palpable masses, no hepatosplenomegaly, no hernias, bladder nonpalpable. No CVA tenderness.  Musculoskeletal: no peripheral edema, normal ROM in bilateral upper and lower extremities  Lymphatics: no cervical or inguinal lymphadenopathy  Skin: no rashes or lesions  Neuro: alert and oriented x 3, no gross deficits  Psych: normal judgment and insight, normal mood/affect and non-anxious  Genitourinary:   patient declined exam   LINCOLN: patient declined exam      Lab Review   Urine analysis today in clinic shows ++protein, +blood 250, +glucose 1000    Lab Results   Component Value Date    WBC 6.63 02/14/2019    HGB 12.7 (L) 02/14/2019    HCT 36.3 (L) 02/14/2019    MCV 83 02/14/2019     02/14/2019     Lab Results   Component Value Date    CREATININE 1.8 (H) 02/15/2019    BUN 26 (H) 02/15/2019     No results found for: PSA  No results found for: PSADIAG    Imaging  NA         Assessment/Plan:      1. Erectile dysfunction due to arterial insufficiency    - Multiple risk factors   - Viagra 20mg given previously - too low of dose.    - Increase to Viagra 100mg. Instructed on use and SE.    - May need 2nd line therapies if Viagra not working     2. Nocturia    - Multifactorial   - Needs much better glucose control   - Trial Flomax first     3. Frequency of urination    - Flomax   - Glucose control   - Also on Lasix     4. Urge incontinence    - Flomax       Follow up in 2 months with PVR

## 2020-01-16 ENCOUNTER — TELEPHONE (OUTPATIENT)
Dept: UROLOGY | Facility: CLINIC | Age: 70
End: 2020-01-16

## 2020-01-16 NOTE — TELEPHONE ENCOUNTER
----- Message from Bell Swain sent at 1/16/2020  2:51 PM CST -----  Contact: tavo /sriram /[phone]   Name of Who is Calling:     What is the request in detail:  request call back in reference to verify scheduled visit and chart notes Please contact to further discuss and advise      Can the clinic reply by [facility]: no     What Number to Call Back if not in [facility]:  tavo /sriram /[phone]

## 2020-03-05 ENCOUNTER — OFFICE VISIT (OUTPATIENT)
Dept: UROLOGY | Facility: CLINIC | Age: 70
End: 2020-03-05
Payer: MEDICARE

## 2020-03-05 VITALS
WEIGHT: 216.5 LBS | SYSTOLIC BLOOD PRESSURE: 146 MMHG | BODY MASS INDEX: 30.31 KG/M2 | HEIGHT: 71 IN | DIASTOLIC BLOOD PRESSURE: 84 MMHG

## 2020-03-05 DIAGNOSIS — N52.01 ERECTILE DYSFUNCTION DUE TO ARTERIAL INSUFFICIENCY: ICD-10-CM

## 2020-03-05 DIAGNOSIS — N39.41 URGE INCONTINENCE: ICD-10-CM

## 2020-03-05 DIAGNOSIS — R31.29 MICROHEMATURIA: ICD-10-CM

## 2020-03-05 DIAGNOSIS — R35.0 FREQUENCY OF URINATION: Primary | ICD-10-CM

## 2020-03-05 DIAGNOSIS — R35.1 NOCTURIA: ICD-10-CM

## 2020-03-05 LAB
BACTERIA #/AREA URNS HPF: ABNORMAL /HPF
MICROSCOPIC COMMENT: ABNORMAL
RBC #/AREA URNS HPF: 18 /HPF (ref 0–4)
WBC #/AREA URNS HPF: 3 /HPF (ref 0–5)

## 2020-03-05 PROCEDURE — 1101F PT FALLS ASSESS-DOCD LE1/YR: CPT | Mod: CPTII,S$GLB,, | Performed by: UROLOGY

## 2020-03-05 PROCEDURE — 1101F PR PT FALLS ASSESS DOC 0-1 FALLS W/OUT INJ PAST YR: ICD-10-PCS | Mod: CPTII,S$GLB,, | Performed by: UROLOGY

## 2020-03-05 PROCEDURE — 51798 US URINE CAPACITY MEASURE: CPT | Mod: S$GLB,,, | Performed by: UROLOGY

## 2020-03-05 PROCEDURE — 1126F AMNT PAIN NOTED NONE PRSNT: CPT | Mod: S$GLB,,, | Performed by: UROLOGY

## 2020-03-05 PROCEDURE — 3079F DIAST BP 80-89 MM HG: CPT | Mod: CPTII,S$GLB,, | Performed by: UROLOGY

## 2020-03-05 PROCEDURE — 3077F SYST BP >= 140 MM HG: CPT | Mod: CPTII,S$GLB,, | Performed by: UROLOGY

## 2020-03-05 PROCEDURE — 1159F PR MEDICATION LIST DOCUMENTED IN MEDICAL RECORD: ICD-10-PCS | Mod: S$GLB,,, | Performed by: UROLOGY

## 2020-03-05 PROCEDURE — 3079F PR MOST RECENT DIASTOLIC BLOOD PRESSURE 80-89 MM HG: ICD-10-PCS | Mod: CPTII,S$GLB,, | Performed by: UROLOGY

## 2020-03-05 PROCEDURE — 99999 PR PBB SHADOW E&M-EST. PATIENT-LVL III: ICD-10-PCS | Mod: PBBFAC,,, | Performed by: UROLOGY

## 2020-03-05 PROCEDURE — 81002 POCT URINE DIPSTICK WITHOUT MICROSCOPE: ICD-10-PCS | Mod: S$GLB,,, | Performed by: UROLOGY

## 2020-03-05 PROCEDURE — 1126F PR PAIN SEVERITY QUANTIFIED, NO PAIN PRESENT: ICD-10-PCS | Mod: S$GLB,,, | Performed by: UROLOGY

## 2020-03-05 PROCEDURE — 51798 POCT BLADDER SCAN: ICD-10-PCS | Mod: S$GLB,,, | Performed by: UROLOGY

## 2020-03-05 PROCEDURE — 3077F PR MOST RECENT SYSTOLIC BLOOD PRESSURE >= 140 MM HG: ICD-10-PCS | Mod: CPTII,S$GLB,, | Performed by: UROLOGY

## 2020-03-05 PROCEDURE — 1159F MED LIST DOCD IN RCRD: CPT | Mod: S$GLB,,, | Performed by: UROLOGY

## 2020-03-05 PROCEDURE — 81002 URINALYSIS NONAUTO W/O SCOPE: CPT | Mod: S$GLB,,, | Performed by: UROLOGY

## 2020-03-05 PROCEDURE — 99214 OFFICE O/P EST MOD 30 MIN: CPT | Mod: 25,S$GLB,, | Performed by: UROLOGY

## 2020-03-05 PROCEDURE — 99214 PR OFFICE/OUTPT VISIT, EST, LEVL IV, 30-39 MIN: ICD-10-PCS | Mod: 25,S$GLB,, | Performed by: UROLOGY

## 2020-03-05 PROCEDURE — 81000 URINALYSIS NONAUTO W/SCOPE: CPT

## 2020-03-05 PROCEDURE — 99999 PR PBB SHADOW E&M-EST. PATIENT-LVL III: CPT | Mod: PBBFAC,,, | Performed by: UROLOGY

## 2020-03-05 NOTE — PROGRESS NOTES
"Subjective:       Chato Bowie is a 69 y.o. male who is an established patient who was referred by PCP for evaluation of ED.      Patient complains of erectile dysfunction. Onset of dysfunction was 4-5 months ago and was sudden in onset.  Patient states the nature of difficulty is both attaining and maintaining erection. Full erections occur never. Partial erections occur never. Libido is not affected. Risk factors for ED include +CAD, HTN, uncontrolled DM2, beta-blocker use. Previous treatment of ED includes Viagra 20mg - not helpful. He was not given a higher dose.     +isosorbide mononitrate listed on MAR but states he is no longer on this medication.      He also reports urinary frequency day and night. Occasional UUI. Tried prostate meds by report but stopped as they were not helpful. +Lasix. Significant glucosuria today.     Unknown if PSA checked by PCP.     PVR (bladder scan) today - 152cc    3/5/2020   Given Viagra 100mg last visit - no improvement. Feels that he has penile shortening.   Also given Flomax for LUTS - no improvement.   PVR (bladder scan) today - 188cc      The following portions of the patient's history were reviewed and updated as appropriate: allergies, current medications, past family history, past medical history, past social history, past surgical history and problem list.    Review of Systems  Constitutional: no fever or chills  ENT: no nasal congestion or sore throat  Respiratory: no cough or shortness of breath  Cardiovascular: no chest pain or palpitations  Gastrointestinal: no nausea or vomiting, tolerating diet  Genitourinary: as per HPI  Hematologic/Lymphatic: no easy bruising or lymphadenopathy  Musculoskeletal: no arthralgias or myalgias  Skin: no rashes or lesions  Neurological: no seizures or tremors  Behavioral/Psych: no auditory or visual hallucinations       Objective:    Vitals: BP (!) 146/84   Ht 5' 11" (1.803 m)   Wt 98.2 kg (216 lb 7.9 oz)   BMI 30.19 kg/m² "     Physical Exam   General: well developed, well nourished in no acute distress  Head: normocephalic, atraumatic  Neck: supple, trachea midline, no obvious enlargement of thyroid  HEENT: EOMI, mucus membranes moist, sclera anicteric, no hearing impairment  Lungs: symmetric expansion, non-labored breathing  Cardiovascular: regular rate and rhythm, normal pulses  Abdomen: soft, non tender, non distended, no palpable masses, no hepatosplenomegaly, no hernias, bladder nonpalpable. No CVA tenderness.  Musculoskeletal: no peripheral edema, normal ROM in bilateral upper and lower extremities  Lymphatics: no cervical or inguinal lymphadenopathy  Skin: no rashes or lesions  Neuro: alert and oriented x 3, no gross deficits  Psych: normal judgment and insight, normal mood/affect and non-anxious  Genitourinary:   patient declined exam   LINCOLN: patient declined exam      Lab Review   Urine analysis today in clinic shows ++protein, +blood 50, +glucose 500    Lab Results   Component Value Date    WBC 6.63 02/14/2019    HGB 12.7 (L) 02/14/2019    HCT 36.3 (L) 02/14/2019    MCV 83 02/14/2019     02/14/2019     Lab Results   Component Value Date    CREATININE 1.8 (H) 02/15/2019    BUN 26 (H) 02/15/2019     No results found for: PSA  No results found for: PSADIAG    Imaging  NA         Assessment/Plan:      1. Erectile dysfunction due to arterial insufficiency    - Multiple risk factors   - Viagra 20mg given previously - too low of dose.    - Viagra 100mg. Instructed on use and SE.    - As he has failed PDE5i, we discussed other alternatives of ED treatment. We discussed KODAK with compression ring, MUSE intraurethral suppository, penile injection therapy as non-surgical options. We also discussed possibility of IPP implantation. He understands the pros and cons of each therapy including cost and risks.   - Interested in KODAK.       2. Nocturia    - Multifactorial   - Needs much better glucose control   - Flomax - no improvement      3. Frequency of urination    - Flomax   - Glucose control   - Also on Lasix      4. Urge incontinence    - Flomax     5. Microhematuria   - UA micro today   - If confirmed, rec CT uro and cysto    Follow up in 3-4 weeks with PVR

## 2020-03-06 LAB
BILIRUB SERPL-MCNC: NORMAL MG/DL
BLOOD URINE, POC: POSITIVE
COLOR, POC UA: YELLOW
GLUCOSE UR QL STRIP: NORMAL
KETONES UR QL STRIP: NORMAL
LEUKOCYTE ESTERASE URINE, POC: NORMAL
NITRITE, POC UA: POSITIVE
PH, POC UA: NORMAL
POC RESIDUAL URINE VOLUME: 188 ML (ref 0–100)
PROTEIN, POC: POSITIVE
SPECIFIC GRAVITY, POC UA: 1000
UROBILINOGEN, POC UA: NORMAL

## 2020-03-19 ENCOUNTER — HOSPITAL ENCOUNTER (INPATIENT)
Facility: HOSPITAL | Age: 70
LOS: 6 days | Discharge: HOME | DRG: 293 | End: 2020-03-25
Attending: EMERGENCY MEDICINE | Admitting: EMERGENCY MEDICINE
Payer: MEDICARE

## 2020-03-19 DIAGNOSIS — R06.02 SHORTNESS OF BREATH: ICD-10-CM

## 2020-03-19 DIAGNOSIS — I50.9 ACUTE CONGESTIVE HEART FAILURE, UNSPECIFIED HEART FAILURE TYPE: Primary | ICD-10-CM

## 2020-03-19 DIAGNOSIS — I50.9 CHF EXACERBATION: ICD-10-CM

## 2020-03-19 PROCEDURE — 99900035 HC TECH TIME PER 15 MIN (STAT)

## 2020-03-19 PROCEDURE — 82962 GLUCOSE BLOOD TEST: CPT

## 2020-03-19 PROCEDURE — 99291 CRITICAL CARE FIRST HOUR: CPT | Mod: 25

## 2020-03-19 PROCEDURE — 27000221 HC OXYGEN, UP TO 24 HOURS

## 2020-03-19 PROCEDURE — 94660 CPAP INITIATION&MGMT: CPT

## 2020-03-19 PROCEDURE — 12000002 HC ACUTE/MED SURGE SEMI-PRIVATE ROOM

## 2020-03-19 PROCEDURE — 27000190 HC CPAP FULL FACE MASK W/VALVE

## 2020-03-19 RX ORDER — FUROSEMIDE 10 MG/ML
80 INJECTION INTRAMUSCULAR; INTRAVENOUS
Status: COMPLETED | OUTPATIENT
Start: 2020-03-20 | End: 2020-03-20

## 2020-03-20 PROBLEM — I50.9 ACUTE CONGESTIVE HEART FAILURE: Status: ACTIVE | Noted: 2020-03-20

## 2020-03-20 LAB
ALBUMIN SERPL BCP-MCNC: 3.4 G/DL (ref 3.5–5.2)
ALP SERPL-CCNC: 79 U/L (ref 55–135)
ALT SERPL W/O P-5'-P-CCNC: 24 U/L (ref 10–44)
ANION GAP SERPL CALC-SCNC: 11 MMOL/L (ref 8–16)
AST SERPL-CCNC: 26 U/L (ref 10–40)
BACTERIA #/AREA URNS HPF: ABNORMAL /HPF
BASOPHILS # BLD AUTO: 0.08 K/UL (ref 0–0.2)
BASOPHILS NFR BLD: 0.8 % (ref 0–1.9)
BILIRUB SERPL-MCNC: 0.5 MG/DL (ref 0.1–1)
BILIRUB UR QL STRIP: NEGATIVE
BNP SERPL-MCNC: 311 PG/ML (ref 0–99)
BUN SERPL-MCNC: 14 MG/DL (ref 8–23)
CALCIUM SERPL-MCNC: 8.8 MG/DL (ref 8.7–10.5)
CHLORIDE SERPL-SCNC: 99 MMOL/L (ref 95–110)
CLARITY UR: CLEAR
CO2 SERPL-SCNC: 29 MMOL/L (ref 23–29)
COLOR UR: ABNORMAL
CREAT SERPL-MCNC: 1.6 MG/DL (ref 0.5–1.4)
DIFFERENTIAL METHOD: ABNORMAL
EOSINOPHIL # BLD AUTO: 0.3 K/UL (ref 0–0.5)
EOSINOPHIL NFR BLD: 2.5 % (ref 0–8)
ERYTHROCYTE [DISTWIDTH] IN BLOOD BY AUTOMATED COUNT: 13.5 % (ref 11.5–14.5)
EST. GFR  (AFRICAN AMERICAN): 50 ML/MIN/1.73 M^2
EST. GFR  (NON AFRICAN AMERICAN): 43 ML/MIN/1.73 M^2
ESTIMATED AVG GLUCOSE: 243 MG/DL (ref 68–131)
ESTIMATED AVG GLUCOSE: 243 MG/DL (ref 68–131)
GLUCOSE SERPL-MCNC: 192 MG/DL (ref 70–110)
GLUCOSE SERPL-MCNC: 209 MG/DL (ref 70–110)
GLUCOSE UR QL STRIP: ABNORMAL
HBA1C MFR BLD HPLC: 10.1 % (ref 4–5.6)
HBA1C MFR BLD HPLC: 10.1 % (ref 4–5.6)
HCT VFR BLD AUTO: 32.9 % (ref 40–54)
HGB BLD-MCNC: 11.3 G/DL (ref 14–18)
HGB UR QL STRIP: ABNORMAL
HYALINE CASTS #/AREA URNS LPF: 0 /LPF
IMM GRANULOCYTES # BLD AUTO: 0.05 K/UL (ref 0–0.04)
IMM GRANULOCYTES NFR BLD AUTO: 0.5 % (ref 0–0.5)
INR PPP: 1 (ref 0.8–1.2)
KETONES UR QL STRIP: NEGATIVE
LEUKOCYTE ESTERASE UR QL STRIP: NEGATIVE
LYMPHOCYTES # BLD AUTO: 1.1 K/UL (ref 1–4.8)
LYMPHOCYTES NFR BLD: 10.9 % (ref 18–48)
MCH RBC QN AUTO: 28.4 PG (ref 27–31)
MCHC RBC AUTO-ENTMCNC: 34.3 G/DL (ref 32–36)
MCV RBC AUTO: 83 FL (ref 82–98)
MICROSCOPIC COMMENT: ABNORMAL
MONOCYTES # BLD AUTO: 0.9 K/UL (ref 0.3–1)
MONOCYTES NFR BLD: 8.3 % (ref 4–15)
NEUTROPHILS # BLD AUTO: 7.9 K/UL (ref 1.8–7.7)
NEUTROPHILS NFR BLD: 77 % (ref 38–73)
NITRITE UR QL STRIP: NEGATIVE
NRBC BLD-RTO: 0 /100 WBC
PH UR STRIP: 6 [PH] (ref 5–8)
PLATELET # BLD AUTO: 239 K/UL (ref 150–350)
PMV BLD AUTO: 8.9 FL (ref 9.2–12.9)
POCT GLUCOSE: 161 MG/DL (ref 70–110)
POCT GLUCOSE: 175 MG/DL (ref 70–110)
POCT GLUCOSE: 192 MG/DL (ref 70–110)
POCT GLUCOSE: 217 MG/DL (ref 70–110)
POTASSIUM SERPL-SCNC: 3.2 MMOL/L (ref 3.5–5.1)
PROT SERPL-MCNC: 7.2 G/DL (ref 6–8.4)
PROT UR QL STRIP: ABNORMAL
PROTHROMBIN TIME: 10.6 SEC (ref 9–12.5)
RBC # BLD AUTO: 3.98 M/UL (ref 4.6–6.2)
RBC #/AREA URNS HPF: 10 /HPF (ref 0–4)
SODIUM SERPL-SCNC: 139 MMOL/L (ref 136–145)
SP GR UR STRIP: 1 (ref 1–1.03)
TROPONIN I SERPL DL<=0.01 NG/ML-MCNC: 0.03 NG/ML (ref 0–0.03)
URN SPEC COLLECT METH UR: ABNORMAL
UROBILINOGEN UR STRIP-ACNC: NEGATIVE EU/DL
WBC # BLD AUTO: 10.25 K/UL (ref 3.9–12.7)
WBC #/AREA URNS HPF: 0 /HPF (ref 0–5)
YEAST URNS QL MICRO: ABNORMAL

## 2020-03-20 PROCEDURE — 25000003 PHARM REV CODE 250: Performed by: HOSPITALIST

## 2020-03-20 PROCEDURE — 99223 1ST HOSP IP/OBS HIGH 75: CPT | Mod: ,,, | Performed by: INTERNAL MEDICINE

## 2020-03-20 PROCEDURE — 36415 COLL VENOUS BLD VENIPUNCTURE: CPT

## 2020-03-20 PROCEDURE — 83036 HEMOGLOBIN GLYCOSYLATED A1C: CPT

## 2020-03-20 PROCEDURE — 63600175 PHARM REV CODE 636 W HCPCS: Performed by: EMERGENCY MEDICINE

## 2020-03-20 PROCEDURE — 85025 COMPLETE CBC W/AUTO DIFF WBC: CPT

## 2020-03-20 PROCEDURE — 93010 ELECTROCARDIOGRAM REPORT: CPT | Mod: ,,, | Performed by: INTERNAL MEDICINE

## 2020-03-20 PROCEDURE — 84484 ASSAY OF TROPONIN QUANT: CPT

## 2020-03-20 PROCEDURE — 93010 EKG 12-LEAD: ICD-10-PCS | Mod: ,,, | Performed by: INTERNAL MEDICINE

## 2020-03-20 PROCEDURE — 83880 ASSAY OF NATRIURETIC PEPTIDE: CPT

## 2020-03-20 PROCEDURE — 84484 ASSAY OF TROPONIN QUANT: CPT | Mod: 91

## 2020-03-20 PROCEDURE — 93005 ELECTROCARDIOGRAM TRACING: CPT

## 2020-03-20 PROCEDURE — 81000 URINALYSIS NONAUTO W/SCOPE: CPT

## 2020-03-20 PROCEDURE — 25000003 PHARM REV CODE 250: Performed by: EMERGENCY MEDICINE

## 2020-03-20 PROCEDURE — 80053 COMPREHEN METABOLIC PANEL: CPT

## 2020-03-20 PROCEDURE — 63600175 PHARM REV CODE 636 W HCPCS: Performed by: HOSPITALIST

## 2020-03-20 PROCEDURE — 99223 PR INITIAL HOSPITAL CARE,LEVL III: ICD-10-PCS | Mod: ,,, | Performed by: INTERNAL MEDICINE

## 2020-03-20 PROCEDURE — 21400001 HC TELEMETRY ROOM

## 2020-03-20 PROCEDURE — 85610 PROTHROMBIN TIME: CPT

## 2020-03-20 RX ORDER — ACETAMINOPHEN 325 MG/1
650 TABLET ORAL EVERY 4 HOURS PRN
Status: DISCONTINUED | OUTPATIENT
Start: 2020-03-20 | End: 2020-03-25 | Stop reason: HOSPADM

## 2020-03-20 RX ORDER — ROSUVASTATIN CALCIUM 10 MG/1
10 TABLET, COATED ORAL DAILY
Status: DISCONTINUED | OUTPATIENT
Start: 2020-03-20 | End: 2020-03-20

## 2020-03-20 RX ORDER — IBUPROFEN 200 MG
16 TABLET ORAL
Status: DISCONTINUED | OUTPATIENT
Start: 2020-03-20 | End: 2020-03-25 | Stop reason: HOSPADM

## 2020-03-20 RX ORDER — GLUCAGON 1 MG
1 KIT INJECTION
Status: DISCONTINUED | OUTPATIENT
Start: 2020-03-20 | End: 2020-03-25 | Stop reason: HOSPADM

## 2020-03-20 RX ORDER — IBUPROFEN 200 MG
24 TABLET ORAL
Status: DISCONTINUED | OUTPATIENT
Start: 2020-03-20 | End: 2020-03-25 | Stop reason: HOSPADM

## 2020-03-20 RX ORDER — ONDANSETRON 2 MG/ML
8 INJECTION INTRAMUSCULAR; INTRAVENOUS EVERY 6 HOURS PRN
Status: DISCONTINUED | OUTPATIENT
Start: 2020-03-20 | End: 2020-03-25 | Stop reason: HOSPADM

## 2020-03-20 RX ORDER — CARVEDILOL 12.5 MG/1
12.5 TABLET ORAL 2 TIMES DAILY
Status: DISCONTINUED | OUTPATIENT
Start: 2020-03-20 | End: 2020-03-23

## 2020-03-20 RX ORDER — FUROSEMIDE 40 MG/1
40 TABLET ORAL 2 TIMES DAILY
Status: CANCELLED | OUTPATIENT
Start: 2020-03-20 | End: 2020-03-21

## 2020-03-20 RX ORDER — FUROSEMIDE 10 MG/ML
40 INJECTION INTRAMUSCULAR; INTRAVENOUS
Status: DISCONTINUED | OUTPATIENT
Start: 2020-03-20 | End: 2020-03-23

## 2020-03-20 RX ORDER — INSULIN ASPART 100 [IU]/ML
30 INJECTION, SUSPENSION SUBCUTANEOUS 2 TIMES DAILY WITH MEALS
Status: DISCONTINUED | OUTPATIENT
Start: 2020-03-20 | End: 2020-03-21

## 2020-03-20 RX ORDER — FUROSEMIDE 10 MG/ML
40 INJECTION INTRAMUSCULAR; INTRAVENOUS
Status: DISCONTINUED | OUTPATIENT
Start: 2020-03-20 | End: 2020-03-20

## 2020-03-20 RX ORDER — ROSUVASTATIN CALCIUM 10 MG/1
10 TABLET, COATED ORAL DAILY
Status: DISCONTINUED | OUTPATIENT
Start: 2020-03-21 | End: 2020-03-25 | Stop reason: HOSPADM

## 2020-03-20 RX ORDER — POTASSIUM CHLORIDE 20 MEQ/15ML
20 SOLUTION ORAL DAILY
Status: DISCONTINUED | OUTPATIENT
Start: 2020-03-20 | End: 2020-03-22

## 2020-03-20 RX ORDER — LOSARTAN POTASSIUM 25 MG/1
100 TABLET ORAL DAILY
Status: DISCONTINUED | OUTPATIENT
Start: 2020-03-20 | End: 2020-03-25 | Stop reason: HOSPADM

## 2020-03-20 RX ORDER — HYDROCHLOROTHIAZIDE 25 MG/1
25 TABLET ORAL DAILY
Status: DISCONTINUED | OUTPATIENT
Start: 2020-03-20 | End: 2020-03-23

## 2020-03-20 RX ORDER — ASPIRIN 81 MG/1
81 TABLET ORAL DAILY
Status: DISCONTINUED | OUTPATIENT
Start: 2020-03-20 | End: 2020-03-25 | Stop reason: HOSPADM

## 2020-03-20 RX ORDER — SODIUM CHLORIDE 0.9 % (FLUSH) 0.9 %
10 SYRINGE (ML) INJECTION
Status: DISCONTINUED | OUTPATIENT
Start: 2020-03-20 | End: 2020-03-25 | Stop reason: HOSPADM

## 2020-03-20 RX ORDER — INSULIN ASPART 100 [IU]/ML
0-5 INJECTION, SOLUTION INTRAVENOUS; SUBCUTANEOUS
Status: DISCONTINUED | OUTPATIENT
Start: 2020-03-20 | End: 2020-03-25 | Stop reason: HOSPADM

## 2020-03-20 RX ORDER — POLYETHYLENE GLYCOL 3350 17 G/17G
17 POWDER, FOR SOLUTION ORAL 2 TIMES DAILY PRN
Status: DISCONTINUED | OUTPATIENT
Start: 2020-03-20 | End: 2020-03-25 | Stop reason: HOSPADM

## 2020-03-20 RX ORDER — AMLODIPINE BESYLATE 5 MG/1
10 TABLET ORAL DAILY
Status: DISCONTINUED | OUTPATIENT
Start: 2020-03-20 | End: 2020-03-25 | Stop reason: HOSPADM

## 2020-03-20 RX ORDER — TAMSULOSIN HYDROCHLORIDE 0.4 MG/1
0.4 CAPSULE ORAL DAILY
Status: DISCONTINUED | OUTPATIENT
Start: 2020-03-20 | End: 2020-03-25 | Stop reason: HOSPADM

## 2020-03-20 RX ORDER — ATORVASTATIN CALCIUM 40 MG/1
80 TABLET, FILM COATED ORAL NIGHTLY
Status: DISCONTINUED | OUTPATIENT
Start: 2020-03-20 | End: 2020-03-20

## 2020-03-20 RX ADMIN — ASPIRIN 81 MG: 81 TABLET, COATED ORAL at 09:03

## 2020-03-20 RX ADMIN — FUROSEMIDE 40 MG: 10 INJECTION, SOLUTION INTRAMUSCULAR; INTRAVENOUS at 10:03

## 2020-03-20 RX ADMIN — NITROGLYCERIN 1 INCH: 20 OINTMENT TOPICAL at 12:03

## 2020-03-20 RX ADMIN — CARVEDILOL 12.5 MG: 12.5 TABLET, FILM COATED ORAL at 09:03

## 2020-03-20 RX ADMIN — FUROSEMIDE 40 MG: 10 INJECTION, SOLUTION INTRAMUSCULAR; INTRAVENOUS at 11:03

## 2020-03-20 RX ADMIN — CARVEDILOL 12.5 MG: 12.5 TABLET, FILM COATED ORAL at 10:03

## 2020-03-20 RX ADMIN — NITROGLYCERIN 1 INCH: 20 OINTMENT TOPICAL at 05:03

## 2020-03-20 RX ADMIN — NITROGLYCERIN 1 INCH: 20 OINTMENT TOPICAL at 06:03

## 2020-03-20 RX ADMIN — LOSARTAN POTASSIUM 100 MG: 25 TABLET ORAL at 09:03

## 2020-03-20 RX ADMIN — INSULIN ASPART 30 UNITS: 100 INJECTION, SUSPENSION SUBCUTANEOUS at 05:03

## 2020-03-20 RX ADMIN — POTASSIUM CHLORIDE 20 MEQ: 1.5 SOLUTION ORAL at 01:03

## 2020-03-20 RX ADMIN — FUROSEMIDE 80 MG: 10 INJECTION, SOLUTION INTRAMUSCULAR; INTRAVENOUS at 12:03

## 2020-03-20 RX ADMIN — AMLODIPINE BESYLATE 10 MG: 5 TABLET ORAL at 09:03

## 2020-03-20 RX ADMIN — TAMSULOSIN HYDROCHLORIDE 0.4 MG: 0.4 CAPSULE ORAL at 01:03

## 2020-03-20 RX ADMIN — HYDROCHLOROTHIAZIDE 25 MG: 25 TABLET ORAL at 09:03

## 2020-03-20 RX ADMIN — ATORVASTATIN CALCIUM 80 MG: 40 TABLET, FILM COATED ORAL at 05:03

## 2020-03-20 NOTE — PLAN OF CARE
To patient's room to discuss patient managing his care at home. Patient confirmed information of facesheet was correct;     TN Role Explained.  Patient identified by using 2 identifiers:  Name and date of birth     Patient stated that Dulce (sister) WILL HELP AT HOME WITH his RECOVERY if needed.     Patient currently denies any home needs at this time and plans to return home;      TN name and contact info placed on the communication board; encouraged to call for any questions or discharge needs      [id number] - [place] [address] [place] [address]  [place] [address]  Phone: [phone] Fax: [phone]       03/20/20 1535   Discharge Assessment   Assessment Type Discharge Planning Assessment   Confirmed/corrected address and phone number on facesheet? Yes   Assessment information obtained from? Patient   Expected Length of Stay (days) 3   Communicated expected length of stay with patient/caregiver yes   Prior to hospitilization cognitive status: Alert/Oriented   Prior to hospitalization functional status: Assistive Equipment   Current cognitive status: Alert/Oriented   Current Functional Status: Assistive Equipment   Lives With alone   Able to Return to Prior Arrangements yes   Is patient able to care for self after discharge? Yes   Who are your caregiver(s) and their phone number(s)? Dulce Ramirez (sister) [phone]   Patient's perception of discharge disposition home or selfcare   Readmission Within the Last 30 Days no previous admission in last 30 days   Patient currently being followed by outpatient case management? No   Patient currently receives any other outside agency services? No  (pt was receiving home health in past; unsure of agency; stated was arranged by [facility])   Equipment Currently Used at Home cane, straight   Do you have any problems affording any of your prescribed medications? No   Is the patient taking  medications as prescribed? yes   Does the patient have transportation home? No   Does the patient receive services at the Coumadin Clinic? No   Discharge Plan A Foster Home;Home   Discharge Plan B   (TBD)   DME Needed Upon Discharge    (TBD)   Patient/Family in Agreement with Plan yes

## 2020-03-20 NOTE — HPI
HPI: 69-year-old male with known congestive heart failure, coronary artery disease, hypertension, diabetes, alcohol abuse comes in with over a week of lower extremity bilateral swelling with progressive worsening shortness of breath that really got acutely worse tonight.  He called 911 emergently as he was so short of breath he could not breathe was found to have O2 sats of 81% and rales.  Patient was placed on BiPAP had nitroglycerin and on arrival to the emergency department he was given Lasix.  Since he has been given Lasix had nitroglycerin paste placed he has diuresed and his respiratory status has much improved.  He is currently down to 2 L of oxygen and feeling much better.  He denies any fevers coughing in the last week.  Patient be referred for admission for acute flash pulmonary edema and congestive heart failure.    Less SOB, denies CP  EKG NSR IVCD NSSTT changes  , troponin negative    Stress test 2017  LVEF: 44 %  Impression: ABNORMAL MYOCARDIAL PERFUSION  1. The perfusion scan is free of evidence for myocardial ischemia.   2. There is mild intensity fixed defect in the inferior wall of the left ventricle, consistent with myocardial injury.   3. Resting wall motion is physiologic.   4. There is resting LV dysfunction with a reduced ejection fraction of 44 %.   5. The ventricular volumes are normal at rest and stress.   6. The extracardiac distribution of radioactivity is normal.   7. When compared to the previous study from 03/08/2016, no change    Echo 2017    1 - Normal left ventricular systolic function (EF 55-60%).     2 - Concentric hypertrophy.     3 - Left ventricular diastolic dysfunction.     4 - Left atrial enlargement.     5 - Mild aortic stenosis, BASIL = 1.95 cm2, mean gradient = 16.7 mmHg.     6 - Mild aortic regurgitation.

## 2020-03-20 NOTE — CONSULTS
[facility]  Cardiology  Consult Note    Patient Name: Chato Bowie  MRN: [id number]  Admission Date: 3/19/2020  Hospital Length of Stay: 0 days  Code Status: Full Code   Attending Provider: Volodymyr Hunt MD   Consulting Provider: Tyrone Steen MD  Primary Care Physician: [facility]  Principal Problem:Acute congestive heart failure    Patient information was obtained from patient and ER records.     Consults  Subjective:     Chief Complaint:  CHF     HPI: 69-year-old male with known congestive heart failure, coronary artery disease, hypertension, diabetes, alcohol abuse comes in with over a week of lower extremity bilateral swelling with progressive worsening shortness of breath that really got acutely worse tonight.  He called 911 emergently as he was so short of breath he could not breathe was found to have O2 sats of 81% and rales.  Patient was placed on BiPAP had nitroglycerin and on arrival to the emergency department he was given Lasix.  Since he has been given Lasix had nitroglycerin paste placed he has diuresed and his respiratory status has much improved.  He is currently down to 2 L of oxygen and feeling much better.  He denies any fevers coughing in the last week.  Patient be referred for admission for acute flash pulmonary edema and congestive heart failure.    Less SOB, denies CP  EKG NSR IVCD NSSTT changes  , troponin negative    Stress test 2017  LVEF: 44 %  Impression: ABNORMAL MYOCARDIAL PERFUSION  1. The perfusion scan is free of evidence for myocardial ischemia.   2. There is mild intensity fixed defect in the inferior wall of the left ventricle, consistent with myocardial injury.   3. Resting wall motion is physiologic.   4. There is resting LV dysfunction with a reduced ejection fraction of 44 %.   5. The ventricular volumes are normal at rest and stress.   6. The extracardiac distribution of radioactivity is normal.   7. When compared to the previous study  from 03/08/2016, no change    Echo 2017    1 - Normal left ventricular systolic function (EF 55-60%).     2 - Concentric hypertrophy.     3 - Left ventricular diastolic dysfunction.     4 - Left atrial enlargement.     5 - Mild aortic stenosis, BASIL = 1.95 cm2, mean gradient = 16.7 mmHg.     6 - Mild aortic regurgitation.     Past Medical History:   Diagnosis Date    Acute congestive heart failure 3/20/2020    Arthritis     Coronary artery disease     Diabetes mellitus     Hyperlipemia     Hypertension        Past Surgical History:   Procedure Laterality Date    EYE SURGERY      VASCULAR SURGERY         Review of patient's allergies indicates:  No Known Allergies    No current facility-administered medications on file prior to encounter.      Current Outpatient Medications on File Prior to Encounter   Medication Sig    amlodipine (NORVASC) 10 MG tablet Take 10 mg by mouth once daily.    aspirin (ECOTRIN) 81 MG EC tablet Take 1 tablet (81 mg total) by mouth once daily.    atorvastatin (LIPITOR) 80 MG tablet Take 80 mg by mouth every evening.    atropine 1% (ISOPTO ATROPINE) 1 % Drop INT 1 GTT INTO OD QD FOR 1 WK    carvedilol (COREG) 12.5 MG tablet Take 1 tablet (12.5 mg total) by mouth 2 (two) times daily. (Patient taking differently: Take 25 mg by mouth 2 (two) times daily. )    furosemide (LASIX) 20 MG tablet     lansoprazole (PREVACID) 30 MG capsule Take 30 mg by mouth once daily.    metformin (GLUCOPHAGE) 1000 MG tablet Take 1,000 mg by mouth 2 (two) times daily with meals.    potassium chloride (MICRO-K) 10 MEQ CpSR Take 20 mEq by mouth once.    rosuvastatin (CRESTOR) 40 MG Tab Take 10 mg by mouth once daily.    tamsulosin (FLOMAX) 0.4 mg Cap Take 1 capsule (0.4 mg total) by mouth once daily.    terbinafine HCl (LAMISIL) 250 mg tablet     aspirin-calcium carbonate 81 mg-300 mg calcium(777 mg) Tab Take 81 mg by mouth.    DUREZOL 0.05 % Drop ophthalmic solution INT 1 GTT INTO OD FID FOR 3  WEEKS    fenofibrate 160 MG Tab Take 160 mg by mouth.    folic acid (FOLVITE) 1 MG tablet Take 1 mg by mouth.    insulin syringe-needle U-100 1 mL 31 gauge x 5/16 Syrg USE TO INJECT INSULIN TWICE A DAY    losartan-hydrochlorothiazide 100-25 mg (HYZAAR) 100-25 mg per tablet Take 1 tablet by mouth once daily.    magnesium oxide (MAG-OX) 400 mg (241.3 mg magnesium) tablet Take 800 mg by mouth.    NOVOLOG MIX 70-30 U-100 INSULN 100 unit/mL (70-30) Soln INJ 55 UNI SC QAM AND 45 UNI QPM    pantoprazole (PROTONIX) 40 MG tablet Take 1 tablet (40 mg total) by mouth 2 (two) times daily.    sildenafil (VIAGRA) 100 MG tablet Take 1 tablet (100 mg total) by mouth daily as needed for Erectile Dysfunction. *generic tabs*     Family History     Problem Relation (Age of Onset)    Diabetes Mother, Father, Sister    Hypertension Mother, Father, Sister        Tobacco Use    Smoking status: Never Smoker    Smokeless tobacco: Never Used   Substance and Sexual Activity    Alcohol use: Yes     Alcohol/week: 4.0 standard drinks     Types: 4 Cans of beer per week    Drug use: No    Sexual activity: Not on file     Review of Systems   Constitution: Negative for decreased appetite.   HENT: Negative for ear discharge.    Eyes: Negative for blurred vision.   Endocrine: Negative for polyphagia.   Skin: Negative for nail changes.   Genitourinary: Negative for bladder incontinence.   Neurological: Negative for aphonia.   Psychiatric/Behavioral: Negative for hallucinations.   Allergic/Immunologic: Negative for hives.     Objective:     Vital Signs (Most Recent):  Temp: 98.2 °F (36.8 °C) (03/20/20 0824)  Pulse: 76 (03/20/20 0824)  Resp: 18 (03/20/20 0601)  BP: 139/75 (03/20/20 0824)  SpO2: 98 % (03/20/20 0824) Vital Signs (24h Range):  Temp:  [97.9 °F (36.6 °C)-98.3 °F (36.8 °C)] 98.2 °F (36.8 °C)  Pulse:  [75-93] 76  Resp:  [18-26] 18  SpO2:  [96 %-100 %] 98 %  BP: (139-176)/() 139/75     Weight: 102.4 kg (225 lb 12 oz)  Body mass  index is 31.49 kg/m².    SpO2: 98 %  O2 Device (Oxygen Therapy): nasal cannula      Intake/Output Summary (Last 24 hours) at 3/20/2020 0949  Last data filed at 3/20/2020 0601  Gross per 24 hour   Intake 0 ml   Output 1050 ml   Net -1050 ml       Lines/Drains/Airways     Peripheral Intravenous Line                 Peripheral IV - Single Lumen 02/14/19 0648 Left Antecubital 400 days                Physical Exam   Constitutional: He is oriented to person, place, and time. He appears well-developed and well-nourished.   HENT:   Head: Normocephalic and atraumatic.   Eyes: Pupils are equal, round, and reactive to light. Conjunctivae are normal.   Neck: Normal range of motion. Neck supple.   Cardiovascular: Normal rate, normal heart sounds and intact distal pulses.   Pulmonary/Chest: Effort normal and breath sounds normal.   Abdominal: Soft. Bowel sounds are normal.   Musculoskeletal: Normal range of motion.   Neurological: He is alert and oriented to person, place, and time.   Skin: Skin is warm and dry.       Significant Labs: All pertinent lab results from the last 24 hours have been reviewed.    Significant Imaging: Echocardiogram:   2D echo with color flow doppler:   Results for orders placed or performed during the hospital encounter of 03/09/17   2D echo with color flow doppler   Result Value Ref Range    QEF 55 55 - 65    Diastolic Dysfunction Yes (A)     Aortic Valve Regurgitation MILD     Aortic Valve Stenosis MILD (A)     Est. PA Systolic Pressure 15.53     Narrative    Date of Procedure: 03/10/2017        TEST DESCRIPTION       Aorta: The aortic root is normal in size, measuring 2.8 cm at sinotubular junction and 3.1 cm at Sinuses of Valsalva. The proximal ascending aorta is normal in size, measuring 2.6 cm across.     Left Atrium: The left atrial volume index is severely enlarged, measuring 52.83 cc/m2.     Left Ventricle: The left ventricle is normal in size, with an end-diastolic diameter of 4.0 cm, and an  end-systolic diameter of 2.9 cm. Wall thickness is increased, with the septum measuring 1.7 cm and the posterior wall measuring 1.9 cm across. Relative   wall thickness was increased at 0.95, and the LV mass index was increased at 174.3 g/m2 consistent with concentric left ventricular hypertrophy. Global left ventricular systolic function appears normal. Visually estimated ejection fraction is 55-60%.   The LV Doppler derived stroke volume equals 92.0 ccs.   The E/e'(lat) is 21, consistent with significant diastolic dysfunction.     Right Atrium: The right atrium is normal in size, measuring 4.1 cm in length and 3.4 cm in width in the apical view.     Right Ventricle: The right ventricle is normal in size measuring 3.7 cm at the base in the apical right ventricle-focused view. Global right ventricular systolic function appears normal. Tricuspid annular plane systolic excursion (TAPSE) is 2.5 cm.   Tissue Doppler-derived tricuspid annular peak systolic velocity (S prime) is 15.8 cm/s. The estimated PA systolic pressure is 16 mmHg.     Aortic Valve:  The aortic valve is moderately sclerotic. The peak gradient obtained across the aortic valve is 30.0 mmHg, with a mean gradient of 16.7 mmHg. Using a left ventricular outflow tract diameter of 2.3 cm, a left ventricular outflow tract   velocity time integral of 23 cm, and a peak instantaneous transvalvular velocity time integral of 47 cm, the calculated aortic valve area is 1.95 cm2, consistent with mild aortic stenosis. Additionally, there is mild aortic regurgitation.     IVC: IVC is normal in size and collapses > 50% with a sniff, suggesting normal right atrial pressure of 3 mmHg.     Intracavitary: There is no evidence of pericardial effusion, intracavity mass, thrombi, or vegetation.         CONCLUSIONS     1 - Normal left ventricular systolic function (EF 55-60%).     2 - Concentric hypertrophy.     3 - Left ventricular diastolic dysfunction.     4 - Left atrial  enlargement.     5 - Mild aortic stenosis, BASIL = 1.95 cm2, mean gradient = 16.7 mmHg.     6 - Mild aortic regurgitation.             This document has been electronically    SIGNED BY: Tyrone Steen MD On: 03/10/2017 12:04     Assessment and Plan:     * Acute congestive heart failure  Diastolic. Repeat echo. Diuresis and afterload reduction as tolerated. If echo stable will f/u prn    Type 2 diabetes mellitus, uncontrolled  Per primary    Hyperlipidemia  On statin    Essential hypertension  Poorly controlled on admission - improved now    CAD (coronary artery disease)  No Cp, No MI. Reports medical Rx by [facility] several years ago. Out patient stress test        VTE Risk Mitigation (From admission, onward)         Ordered     IP VTE HIGH RISK PATIENT  Once      03/20/20 0245     Place MASOOD hose  Until discontinued      03/20/20 0245     Place sequential compression device  Until discontinued      03/20/20 0245                Thank you for your consult. I will follow-up with patient. Please contact us if you have any additional questions.    Tyrone Steen MD  Cardiology   [facility] [place]

## 2020-03-20 NOTE — ED PROVIDER NOTES
Encounter Date: 3/19/2020       History     Chief Complaint   Patient presents with    Shortness of Breath     Pt c/o SOB/CP 81% RA with rales, not on home O2, pt arrives via EMS on CPAP at 96%. pt denies fever or flu s/s.  4spray NTG en route     Pt is a 70 y/o M with PMHx as per below who presents with concern for acute onset of SOB  Pt states he woke feeling profoundly SOB so he called 911.  When EMS arrived he was SOB with SpO2 of 81%.  Pt placed on BIPAP and given four sprays of sublingual nitroglycerin by EMS with improvement of his symptoms.          Review of patient's allergies indicates:  No Known Allergies  Past Medical History:   Diagnosis Date    Acute congestive heart failure 3/20/2020    Arthritis     Coronary artery disease     Diabetes mellitus     Hyperlipemia     Hypertension      Past Surgical History:   Procedure Laterality Date    EYE SURGERY      VASCULAR SURGERY       Family History   Problem Relation Age of Onset    Hypertension Mother     Diabetes Mother     Diabetes Father     Hypertension Father     Diabetes Sister     Hypertension Sister      Social History     Tobacco Use    Smoking status: Never Smoker    Smokeless tobacco: Never Used   Substance Use Topics    Alcohol use: Yes     Alcohol/week: 4.0 standard drinks     Types: 4 Cans of beer per week    Drug use: No     Review of Systems   Constitutional: Negative for chills and fever.   HENT: Negative for congestion, postnasal drip, rhinorrhea, sinus pressure, sore throat, trouble swallowing and voice change.    Eyes: Negative for pain and redness.   Respiratory: Positive for shortness of breath. Negative for apnea, cough, choking, chest tightness, wheezing and stridor.    Cardiovascular: Positive for leg swelling. Negative for chest pain.   Gastrointestinal: Negative for abdominal distention, abdominal pain, blood in stool, constipation, diarrhea, nausea and vomiting.   Endocrine: Negative for cold intolerance and  heat intolerance.   Genitourinary: Negative for dysuria, flank pain, hematuria and urgency.   Musculoskeletal: Negative for arthralgias and myalgias.   Skin: Negative for rash and wound.   Allergic/Immunologic: Negative for immunocompromised state.   Neurological: Negative for weakness, light-headedness and headaches.   Hematological: Does not bruise/bleed easily.   Psychiatric/Behavioral: Negative for agitation, behavioral problems, confusion and hallucinations. The patient is not nervous/anxious.        Physical Exam     Initial Vitals [03/20/20 0000]   BP Pulse Resp Temp SpO2   (!) 176/106 93 (!) 26 98 °F (36.7 °C) 96 %      MAP       --         Physical Exam    Nursing note and vitals reviewed.  Constitutional: He appears well-developed and well-nourished. He is not diaphoretic. He appears distressed.   Pt arrived on BIPAP due to moderately increased WOB   HENT:   Head: Normocephalic and atraumatic.   Right Ear: External ear normal.   Left Ear: External ear normal.   Mouth/Throat: Oropharynx is clear and moist.   Eyes: Conjunctivae are normal. Right eye exhibits no discharge. Left eye exhibits no discharge. No scleral icterus.   Neck: Normal range of motion. Neck supple. No thyromegaly present. No tracheal deviation present. JVD present.   Cardiovascular: Normal rate, regular rhythm, normal heart sounds and intact distal pulses. Exam reveals no gallop and no friction rub.    No murmur heard.  Pulmonary/Chest: No stridor. He is in respiratory distress. He has no wheezes. He has no rhonchi. He has rales. He exhibits no tenderness.   Abdominal: Soft. He exhibits no distension. There is no tenderness. There is no rebound and no guarding.   Musculoskeletal: Normal range of motion. He exhibits edema (2+ pedal edema noted on exam). He exhibits no tenderness.   Lymphadenopathy:     He has no cervical adenopathy.   Neurological: He is alert and oriented to person, place, and time. He has normal strength. GCS score is 15.  GCS eye subscore is 4. GCS verbal subscore is 5. GCS motor subscore is 6.   VALDO with NGND's   Skin: Skin is warm and dry. Capillary refill takes less than 2 seconds. No rash noted. No erythema.   Psychiatric: He has a normal mood and affect. His behavior is normal. Judgment and thought content normal.         ED Course   Critical Care  Date/Time: 3/20/2020 2:39 AM  Performed by: Jose Gaitan MD  Authorized by: Jose Gaitan MD   Direct patient critical care time: 20 minutes  Additional history critical care time: 5 minutes  Ordering / reviewing critical care time: 10 minutes  Documentation critical care time: 5 minutes  Consulting other physicians critical care time: 5 minutes  Consult with family critical care time: 0 minutes  Total critical care time (exclusive of procedural time) : 45 minutes  Critical care time was exclusive of separately billable procedures and treating other patients.  Critical care was necessary to treat or prevent imminent or life-threatening deterioration of the following conditions: cardiac failure (CHF / flash pulmonary edema requiring BIPAP, nitroglycerin and Lasix).  Critical care was time spent personally by me on the following activities: evaluation of patient's response to treatment, ordering and performing treatments and interventions, pulse oximetry, development of treatment plan with patient or surrogate, examination of patient, ordering and review of laboratory studies, re-evaluation of patient's condition, discussions with consultants, obtaining history from patient or surrogate, ordering and review of radiographic studies, review of old charts and interpretation of cardiac output measurements.        Labs Reviewed   CBC W/ AUTO DIFFERENTIAL - Abnormal; Notable for the following components:       Result Value    RBC 3.98 (*)     Hemoglobin 11.3 (*)     Hematocrit 32.9 (*)     MPV 8.9 (*)     Gran # (ANC) 7.9 (*)     Immature Grans (Abs) 0.05 (*)     Gran% 77.0 (*)      Lymph% 10.9 (*)     All other components within normal limits   COMPREHENSIVE METABOLIC PANEL - Abnormal; Notable for the following components:    Potassium 3.2 (*)     Glucose 209 (*)     Creatinine 1.6 (*)     Albumin 3.4 (*)     eGFR if  50 (*)     eGFR if non  43 (*)     All other components within normal limits   TROPONIN I - Abnormal; Notable for the following components:    Troponin I 0.030 (*)     All other components within normal limits   B-TYPE NATRIURETIC PEPTIDE - Abnormal; Notable for the following components:     (*)     All other components within normal limits   URINALYSIS, REFLEX TO URINE CULTURE - Abnormal; Notable for the following components:    Protein, UA 2+ (*)     Glucose, UA 1+ (*)     Occult Blood UA 2+ (*)     All other components within normal limits    Narrative:     Preferred Collection Type->Urine, Clean Catch   URINALYSIS MICROSCOPIC - Abnormal; Notable for the following components:    RBC, UA 10 (*)     Yeast, UA Rare (*)     All other components within normal limits    Narrative:     Preferred Collection Type->Urine, Clean Catch   POCT GLUCOSE - Abnormal; Notable for the following components:    POCT Glucose 192 (*)     All other components within normal limits   PROTIME-INR   TROPONIN I   HEMOGLOBIN A1C   HEMOGLOBIN A1C   POCT GLUCOSE MONITORING CONTINUOUS   POCT GLUCOSE MONITORING CONTINUOUS     EKG Readings: (Independently Interpreted)   Initial Reading: No STEMI. Rhythm: Normal Sinus Rhythm. Heart Rate: 81. Ectopy: No Ectopy. Conduction: 1st Degree AV Block. ST Segments: Normal ST Segments. T Waves: Normal. Axis: Normal. Clinical Impression: Normal Sinus Rhythm       Imaging Results          X-Ray Chest AP Portable (Final result)  Result time 03/20/20 01:01:36    Final result by Gen Brooke MD (03/20/20 01:01:36)                 Impression:      Interstitial and bibasilar airspace opacities which may relate to edema.  Atypical  interstitial infection is not excluded.  Clinical correlation advised.      Electronically signed by: Gen Brooke MD  Date:    03/20/2020  Time:    01:01             Narrative:    EXAMINATION:  XR CHEST AP PORTABLE    CLINICAL HISTORY:  CHF;    TECHNIQUE:  Single frontal view of the chest was performed.    COMPARISON:  Chest radiograph 03/09/2017    FINDINGS:  Cardiac monitoring leads overlie the chest.  Cardiomediastinal silhouette is mildly prominent.  There is prominence of the central pulmonary vasculature with increased interstitial attenuation bilaterally.  There are mild perihilar and bibasilar opacities.  There is a possible small volume right-sided pleural effusion.  No evidence of pneumothorax.  Osseous structures demonstrate degenerative changes.                                Pt arrived afebrile and on BIPAP due to moderate respiratory distress.   His WOB and BP improved following continuation of BIPAP along with administration of nitro paste and Lasix.  Pt's presentation clearly correlates with flash pulmonary edema / CHF.  Pt gradually weaned off BIPAP and now comfortably resting on O2 via NC.  EKG revealed no acute findings concerning for ischemia, arrhythmia, heart block or any pathology to warrant cardiology consultation.  Pt discussed with Dr. Domingo and admitted for further evaluation and management.    Jose Gaitan MD                                             Clinical Impression:       ICD-10-CM ICD-9-CM   1. Acute congestive heart failure, unspecified heart failure type I50.9 428.0   2. Shortness of breath R06.02 786.05             ED Disposition Condition    Admit             I, Jose Gaitan, personally performed the services described in this documentation. All medical record entries made by the scribe were at my direction and in my presence.  I have reviewed the chart and agree that the record reflects my personal performance and is accurate and complete.                  Jose Gaitan MD  03/20/20 4212

## 2020-03-20 NOTE — SUBJECTIVE & OBJECTIVE
Past Medical History:   Diagnosis Date    Acute congestive heart failure 3/20/2020    Arthritis     Coronary artery disease     Diabetes mellitus     Hyperlipemia     Hypertension        Past Surgical History:   Procedure Laterality Date    EYE SURGERY      VASCULAR SURGERY         Review of patient's allergies indicates:  No Known Allergies    No current facility-administered medications on file prior to encounter.      Current Outpatient Medications on File Prior to Encounter   Medication Sig    amlodipine (NORVASC) 10 MG tablet Take 10 mg by mouth once daily.    aspirin (ECOTRIN) 81 MG EC tablet Take 1 tablet (81 mg total) by mouth once daily.    aspirin-calcium carbonate 81 mg-300 mg calcium(777 mg) Tab Take 81 mg by mouth.    atorvastatin (LIPITOR) 80 MG tablet Take 80 mg by mouth every evening.    atropine 1% (ISOPTO ATROPINE) 1 % Drop INT 1 GTT INTO OD QD FOR 1 WK    carvedilol (COREG) 12.5 MG tablet Take 1 tablet (12.5 mg total) by mouth 2 (two) times daily. (Patient taking differently: Take 25 mg by mouth 2 (two) times daily. )    DUREZOL 0.05 % Drop ophthalmic solution INT 1 GTT INTO OD FID FOR 3 WEEKS    fenofibrate 160 MG Tab Take 160 mg by mouth.    folic acid (FOLVITE) 1 MG tablet Take 1 mg by mouth.    furosemide (LASIX) 20 MG tablet     insulin syringe-needle U-100 1 mL 31 gauge x 5/16 Syrg USE TO INJECT INSULIN TWICE A DAY    lansoprazole (PREVACID) 30 MG capsule Take 30 mg by mouth once daily.    losartan-hydrochlorothiazide 100-25 mg (HYZAAR) 100-25 mg per tablet Take 1 tablet by mouth once daily.    magnesium oxide (MAG-OX) 400 mg (241.3 mg magnesium) tablet Take 800 mg by mouth.    metformin (GLUCOPHAGE) 1000 MG tablet Take 1,000 mg by mouth 2 (two) times daily with meals.    NOVOLOG MIX 70-30 U-100 INSULN 100 unit/mL (70-30) Soln INJ 55 UNI SC QAM AND 45 UNI QPM    pantoprazole (PROTONIX) 40 MG tablet Take 1 tablet (40 mg total) by mouth 2 (two) times daily.     potassium chloride (MICRO-K) 10 MEQ CpSR Take 20 mEq by mouth once.    rosuvastatin (CRESTOR) 40 MG Tab Take 10 mg by mouth once daily.    sildenafil (VIAGRA) 100 MG tablet Take 1 tablet (100 mg total) by mouth daily as needed for Erectile Dysfunction. *generic tabs*    tamsulosin (FLOMAX) 0.4 mg Cap Take 1 capsule (0.4 mg total) by mouth once daily.    terbinafine HCl (LAMISIL) 250 mg tablet      Family History     Problem Relation (Age of Onset)    Diabetes Mother, Father, Sister    Hypertension Mother, Father, Sister        Tobacco Use    Smoking status: Never Smoker    Smokeless tobacco: Never Used   Substance and Sexual Activity    Alcohol use: Yes     Alcohol/week: 4.0 standard drinks     Types: 4 Cans of beer per week    Drug use: No    Sexual activity: Not on file     Review of Systems   All other systems reviewed and are negative.    Objective:     Vital Signs (Most Recent):  Temp: 98 °F (36.7 °C) (03/20/20 0000)  Pulse: 78 (03/20/20 0302)  Resp: (!) 23 (03/20/20 0231)  BP: (!) 174/93 (03/20/20 0302)  SpO2: 98 % (03/20/20 0302) Vital Signs (24h Range):  Temp:  [98 °F (36.7 °C)] 98 °F (36.7 °C)  Pulse:  [75-93] 78  Resp:  [20-26] 23  SpO2:  [96 %-100 %] 98 %  BP: (146-176)/() 174/93     Weight: 95.3 kg (210 lb)  Body mass index is 29.29 kg/m².    Physical Exam   Constitutional: He is oriented to person, place, and time. He appears well-developed and well-nourished.   HENT:   Head: Normocephalic and atraumatic.   Eyes: Pupils are equal, round, and reactive to light. EOM are normal.   Neck: Normal range of motion. Neck supple.   Cardiovascular: Normal rate, regular rhythm, normal heart sounds and intact distal pulses.   No murmur heard.  Pulmonary/Chest: He is in respiratory distress. He has no wheezes. He has rales.   Abdominal: Soft. Bowel sounds are normal. He exhibits no distension. There is no tenderness. There is no rebound and no guarding.   Musculoskeletal: Normal range of motion. He  exhibits edema. He exhibits no deformity.   Neurological: He is alert and oriented to person, place, and time. No cranial nerve deficit.   Skin: Skin is warm and dry. Capillary refill takes 2 to 3 seconds. No rash noted.   Psychiatric: He has a normal mood and affect. His behavior is normal. Judgment and thought content normal.   Nursing note and vitals reviewed.        CRANIAL NERVES     CN III, IV, VI   Pupils are equal, round, and reactive to light.  Extraocular motions are normal.        Significant Labs: All pertinent labs within the past 24 hours have been reviewed.    Significant Imaging: I have reviewed and interpreted all pertinent imaging results/findings within the past 24 hours.

## 2020-03-20 NOTE — SUBJECTIVE & OBJECTIVE
Past Medical History:   Diagnosis Date    Acute congestive heart failure 3/20/2020    Arthritis     Coronary artery disease     Diabetes mellitus     Hyperlipemia     Hypertension        Past Surgical History:   Procedure Laterality Date    EYE SURGERY      VASCULAR SURGERY         Review of patient's allergies indicates:  No Known Allergies    No current facility-administered medications on file prior to encounter.      Current Outpatient Medications on File Prior to Encounter   Medication Sig    amlodipine (NORVASC) 10 MG tablet Take 10 mg by mouth once daily.    aspirin (ECOTRIN) 81 MG EC tablet Take 1 tablet (81 mg total) by mouth once daily.    atorvastatin (LIPITOR) 80 MG tablet Take 80 mg by mouth every evening.    atropine 1% (ISOPTO ATROPINE) 1 % Drop INT 1 GTT INTO OD QD FOR 1 WK    carvedilol (COREG) 12.5 MG tablet Take 1 tablet (12.5 mg total) by mouth 2 (two) times daily. (Patient taking differently: Take 25 mg by mouth 2 (two) times daily. )    furosemide (LASIX) 20 MG tablet     lansoprazole (PREVACID) 30 MG capsule Take 30 mg by mouth once daily.    metformin (GLUCOPHAGE) 1000 MG tablet Take 1,000 mg by mouth 2 (two) times daily with meals.    potassium chloride (MICRO-K) 10 MEQ CpSR Take 20 mEq by mouth once.    rosuvastatin (CRESTOR) 40 MG Tab Take 10 mg by mouth once daily.    tamsulosin (FLOMAX) 0.4 mg Cap Take 1 capsule (0.4 mg total) by mouth once daily.    terbinafine HCl (LAMISIL) 250 mg tablet     aspirin-calcium carbonate 81 mg-300 mg calcium(777 mg) Tab Take 81 mg by mouth.    DUREZOL 0.05 % Drop ophthalmic solution INT 1 GTT INTO OD FID FOR 3 WEEKS    fenofibrate 160 MG Tab Take 160 mg by mouth.    folic acid (FOLVITE) 1 MG tablet Take 1 mg by mouth.    insulin syringe-needle U-100 1 mL 31 gauge x 5/16 Syrg USE TO INJECT INSULIN TWICE A DAY    losartan-hydrochlorothiazide 100-25 mg (HYZAAR) 100-25 mg per tablet Take 1 tablet by mouth once daily.    magnesium  oxide (MAG-OX) 400 mg (241.3 mg magnesium) tablet Take 800 mg by mouth.    NOVOLOG MIX 70-30 U-100 INSULN 100 unit/mL (70-30) Soln INJ 55 UNI SC QAM AND 45 UNI QPM    pantoprazole (PROTONIX) 40 MG tablet Take 1 tablet (40 mg total) by mouth 2 (two) times daily.    sildenafil (VIAGRA) 100 MG tablet Take 1 tablet (100 mg total) by mouth daily as needed for Erectile Dysfunction. *generic tabs*     Family History     Problem Relation (Age of Onset)    Diabetes Mother, Father, Sister    Hypertension Mother, Father, Sister        Tobacco Use    Smoking status: Never Smoker    Smokeless tobacco: Never Used   Substance and Sexual Activity    Alcohol use: Yes     Alcohol/week: 4.0 standard drinks     Types: 4 Cans of beer per week    Drug use: No    Sexual activity: Not on file     Review of Systems   Constitution: Negative for decreased appetite.   HENT: Negative for ear discharge.    Eyes: Negative for blurred vision.   Endocrine: Negative for polyphagia.   Skin: Negative for nail changes.   Genitourinary: Negative for bladder incontinence.   Neurological: Negative for aphonia.   Psychiatric/Behavioral: Negative for hallucinations.   Allergic/Immunologic: Negative for hives.     Objective:     Vital Signs (Most Recent):  Temp: 98.2 °F (36.8 °C) (03/20/20 0824)  Pulse: 76 (03/20/20 0824)  Resp: 18 (03/20/20 0601)  BP: 139/75 (03/20/20 0824)  SpO2: 98 % (03/20/20 0824) Vital Signs (24h Range):  Temp:  [97.9 °F (36.6 °C)-98.3 °F (36.8 °C)] 98.2 °F (36.8 °C)  Pulse:  [75-93] 76  Resp:  [18-26] 18  SpO2:  [96 %-100 %] 98 %  BP: (139-176)/() 139/75     Weight: 102.4 kg (225 lb 12 oz)  Body mass index is 31.49 kg/m².    SpO2: 98 %  O2 Device (Oxygen Therapy): nasal cannula      Intake/Output Summary (Last 24 hours) at 3/20/2020 0978  Last data filed at 3/20/2020 0601  Gross per 24 hour   Intake 0 ml   Output 1050 ml   Net -1050 ml       Lines/Drains/Airways     Peripheral Intravenous Line                 Peripheral  IV - Single Lumen 02/14/19 0648 Left Antecubital 400 days                Physical Exam   Constitutional: He is oriented to person, place, and time. He appears well-developed and well-nourished.   HENT:   Head: Normocephalic and atraumatic.   Eyes: Pupils are equal, round, and reactive to light. Conjunctivae are normal.   Neck: Normal range of motion. Neck supple.   Cardiovascular: Normal rate, normal heart sounds and intact distal pulses.   Pulmonary/Chest: Effort normal and breath sounds normal.   Abdominal: Soft. Bowel sounds are normal.   Musculoskeletal: Normal range of motion.   Neurological: He is alert and oriented to person, place, and time.   Skin: Skin is warm and dry.       Significant Labs: All pertinent lab results from the last 24 hours have been reviewed.    Significant Imaging: Echocardiogram:   2D echo with color flow doppler:   Results for orders placed or performed during the hospital encounter of 03/09/17   2D echo with color flow doppler   Result Value Ref Range    QEF 55 55 - 65    Diastolic Dysfunction Yes (A)     Aortic Valve Regurgitation MILD     Aortic Valve Stenosis MILD (A)     Est. PA Systolic Pressure 15.53     Narrative    Date of Procedure: 03/10/2017        TEST DESCRIPTION       Aorta: The aortic root is normal in size, measuring 2.8 cm at sinotubular junction and 3.1 cm at Sinuses of Valsalva. The proximal ascending aorta is normal in size, measuring 2.6 cm across.     Left Atrium: The left atrial volume index is severely enlarged, measuring 52.83 cc/m2.     Left Ventricle: The left ventricle is normal in size, with an end-diastolic diameter of 4.0 cm, and an end-systolic diameter of 2.9 cm. Wall thickness is increased, with the septum measuring 1.7 cm and the posterior wall measuring 1.9 cm across. Relative   wall thickness was increased at 0.95, and the LV mass index was increased at 174.3 g/m2 consistent with concentric left ventricular hypertrophy. Global left ventricular  systolic function appears normal. Visually estimated ejection fraction is 55-60%.   The LV Doppler derived stroke volume equals 92.0 ccs.   The E/e'(lat) is 21, consistent with significant diastolic dysfunction.     Right Atrium: The right atrium is normal in size, measuring 4.1 cm in length and 3.4 cm in width in the apical view.     Right Ventricle: The right ventricle is normal in size measuring 3.7 cm at the base in the apical right ventricle-focused view. Global right ventricular systolic function appears normal. Tricuspid annular plane systolic excursion (TAPSE) is 2.5 cm.   Tissue Doppler-derived tricuspid annular peak systolic velocity (S prime) is 15.8 cm/s. The estimated PA systolic pressure is 16 mmHg.     Aortic Valve:  The aortic valve is moderately sclerotic. The peak gradient obtained across the aortic valve is 30.0 mmHg, with a mean gradient of 16.7 mmHg. Using a left ventricular outflow tract diameter of 2.3 cm, a left ventricular outflow tract   velocity time integral of 23 cm, and a peak instantaneous transvalvular velocity time integral of 47 cm, the calculated aortic valve area is 1.95 cm2, consistent with mild aortic stenosis. Additionally, there is mild aortic regurgitation.     IVC: IVC is normal in size and collapses > 50% with a sniff, suggesting normal right atrial pressure of 3 mmHg.     Intracavitary: There is no evidence of pericardial effusion, intracavity mass, thrombi, or vegetation.         CONCLUSIONS     1 - Normal left ventricular systolic function (EF 55-60%).     2 - Concentric hypertrophy.     3 - Left ventricular diastolic dysfunction.     4 - Left atrial enlargement.     5 - Mild aortic stenosis, BASIL = 1.95 cm2, mean gradient = 16.7 mmHg.     6 - Mild aortic regurgitation.             This document has been electronically    SIGNED BY: Tyrone Steen MD On: 03/10/2017 12:04

## 2020-03-20 NOTE — HPI
69-year-old male with known congestive heart failure, coronary artery disease, hypertension, diabetes, alcohol abuse comes in with over a week of lower extremity bilateral swelling with progressive worsening shortness of breath that really got acutely worse tonight.  He called 911 emergently as he was so short of breath he could not breathe was found to have O2 sats of 81% and rales.  Patient was placed on BiPAP had nitroglycerin and on arrival to the emergency department he was given Lasix.  Since he has been given Lasix had nitroglycerin paste placed he has diuresed and his respiratory status has much improved.  He is currently down to 2 L of oxygen and feeling much better.  He denies any fevers coughing in the last week.  Patient be referred for admission for acute flash pulmonary edema and congestive heart failure.

## 2020-03-20 NOTE — ASSESSMENT & PLAN NOTE
Patient has diuresed well with Lasix 80 mg IV in the ED.  Will place on Lasix 40 mg IV q.12 hours.  Continue nitroglycerin paste.  Supplemental oxygen as needed and wean oxygen as tolerates.  Aim for better blood pressure control.

## 2020-03-20 NOTE — RESPIRATORY THERAPY
Pt in on ambulance CPAP. Placed on V60 BIPAP per Dr Gaitan. 15/8, R 8, FIO2 30%. Medium mask. Tolerating well.

## 2020-03-20 NOTE — H&P
[facility] Hospital Medicine  History & Physical    Patient Name: Chato Bowie  MRN: [id number]  Admission Date: 3/19/2020  Attending Physician: Jose Gaitan MD   Primary Care Provider: [facility]         Patient information was obtained from patient and ER records.     Subjective:     Principal Problem:Acute congestive heart failure    Chief Complaint:   Chief Complaint   Patient presents with    Shortness of Breath     Pt c/o SOB/CP 81% RA with rales, not on home O2, pt arrives via EMS on CPAP at 96%. pt denies fever or flu s/s.  4spray NTG en route        HPI: 69-year-old male with known congestive heart failure, coronary artery disease, hypertension, diabetes, alcohol abuse comes in with over a week of lower extremity bilateral swelling with progressive worsening shortness of breath that really got acutely worse tonight.  He called 911 emergently as he was so short of breath he could not breathe was found to have O2 sats of 81% and rales.  Patient was placed on BiPAP had nitroglycerin and on arrival to the emergency department he was given Lasix.  Since he has been given Lasix had nitroglycerin paste placed he has diuresed and his respiratory status has much improved.  He is currently down to 2 L of oxygen and feeling much better.  He denies any fevers coughing in the last week.  Patient be referred for admission for acute flash pulmonary edema and congestive heart failure.    Past Medical History:   Diagnosis Date    Acute congestive heart failure 3/20/2020    Arthritis     Coronary artery disease     Diabetes mellitus     Hyperlipemia     Hypertension        Past Surgical History:   Procedure Laterality Date    EYE SURGERY      VASCULAR SURGERY         Review of patient's allergies indicates:  No Known Allergies    No current facility-administered medications on file prior to encounter.      Current Outpatient Medications on File Prior to Encounter   Medication Sig     amlodipine (NORVASC) 10 MG tablet Take 10 mg by mouth once daily.    aspirin (ECOTRIN) 81 MG EC tablet Take 1 tablet (81 mg total) by mouth once daily.    aspirin-calcium carbonate 81 mg-300 mg calcium(777 mg) Tab Take 81 mg by mouth.    atorvastatin (LIPITOR) 80 MG tablet Take 80 mg by mouth every evening.    atropine 1% (ISOPTO ATROPINE) 1 % Drop INT 1 GTT INTO OD QD FOR 1 WK    carvedilol (COREG) 12.5 MG tablet Take 1 tablet (12.5 mg total) by mouth 2 (two) times daily. (Patient taking differently: Take 25 mg by mouth 2 (two) times daily. )    DUREZOL 0.05 % Drop ophthalmic solution INT 1 GTT INTO OD FID FOR 3 WEEKS    fenofibrate 160 MG Tab Take 160 mg by mouth.    folic acid (FOLVITE) 1 MG tablet Take 1 mg by mouth.    furosemide (LASIX) 20 MG tablet     insulin syringe-needle U-100 1 mL 31 gauge x 5/16 Syrg USE TO INJECT INSULIN TWICE A DAY    lansoprazole (PREVACID) 30 MG capsule Take 30 mg by mouth once daily.    losartan-hydrochlorothiazide 100-25 mg (HYZAAR) 100-25 mg per tablet Take 1 tablet by mouth once daily.    magnesium oxide (MAG-OX) 400 mg (241.3 mg magnesium) tablet Take 800 mg by mouth.    metformin (GLUCOPHAGE) 1000 MG tablet Take 1,000 mg by mouth 2 (two) times daily with meals.    NOVOLOG MIX 70-30 U-100 INSULN 100 unit/mL (70-30) Soln INJ 55 UNI SC QAM AND 45 UNI QPM    pantoprazole (PROTONIX) 40 MG tablet Take 1 tablet (40 mg total) by mouth 2 (two) times daily.    potassium chloride (MICRO-K) 10 MEQ CpSR Take 20 mEq by mouth once.    rosuvastatin (CRESTOR) 40 MG Tab Take 10 mg by mouth once daily.    sildenafil (VIAGRA) 100 MG tablet Take 1 tablet (100 mg total) by mouth daily as needed for Erectile Dysfunction. *generic tabs*    tamsulosin (FLOMAX) 0.4 mg Cap Take 1 capsule (0.4 mg total) by mouth once daily.    terbinafine HCl (LAMISIL) 250 mg tablet      Family History     Problem Relation (Age of Onset)    Diabetes Mother, Father, Sister    Hypertension Mother,  Father, Sister        Tobacco Use    Smoking status: Never Smoker    Smokeless tobacco: Never Used   Substance and Sexual Activity    Alcohol use: Yes     Alcohol/week: 4.0 standard drinks     Types: 4 Cans of beer per week    Drug use: No    Sexual activity: Not on file     Review of Systems   All other systems reviewed and are negative.    Objective:     Vital Signs (Most Recent):  Temp: 98 °F (36.7 °C) (03/20/20 0000)  Pulse: 78 (03/20/20 0302)  Resp: (!) 23 (03/20/20 0231)  BP: (!) 174/93 (03/20/20 0302)  SpO2: 98 % (03/20/20 0302) Vital Signs (24h Range):  Temp:  [98 °F (36.7 °C)] 98 °F (36.7 °C)  Pulse:  [75-93] 78  Resp:  [20-26] 23  SpO2:  [96 %-100 %] 98 %  BP: (146-176)/() 174/93     Weight: 95.3 kg (210 lb)  Body mass index is 29.29 kg/m².    Physical Exam   Constitutional: He is oriented to person, place, and time. He appears well-developed and well-nourished.   HENT:   Head: Normocephalic and atraumatic.   Eyes: Pupils are equal, round, and reactive to light. EOM are normal.   Neck: Normal range of motion. Neck supple.   Cardiovascular: Normal rate, regular rhythm, normal heart sounds and intact distal pulses.   No murmur heard.  Pulmonary/Chest: He is in respiratory distress. He has no wheezes. He has rales.   Abdominal: Soft. Bowel sounds are normal. He exhibits no distension. There is no tenderness. There is no rebound and no guarding.   Musculoskeletal: Normal range of motion. He exhibits edema. He exhibits no deformity.   Neurological: He is alert and oriented to person, place, and time. No cranial nerve deficit.   Skin: Skin is warm and dry. Capillary refill takes 2 to 3 seconds. No rash noted.   Psychiatric: He has a normal mood and affect. His behavior is normal. Judgment and thought content normal.   Nursing note and vitals reviewed.        CRANIAL NERVES     CN III, IV, VI   Pupils are equal, round, and reactive to light.  Extraocular motions are normal.        Significant Labs: All  pertinent labs within the past 24 hours have been reviewed.    Significant Imaging: I have reviewed and interpreted all pertinent imaging results/findings within the past 24 hours.    Assessment/Plan:     * Acute congestive heart failure  Patient has diuresed well with Lasix 80 mg IV in the ED.  Will place on Lasix 40 mg IV q.12 hours.  Continue nitroglycerin paste.  Supplemental oxygen as needed and wean oxygen as tolerates.  Aim for better blood pressure control.      Anemia of chronic disease  Stable      Type 2 diabetes mellitus, uncontrolled  Clarify home meds and resume      Essential hypertension  Resume home meds and monitor.  Unclear patient is compliant at home.      CAD (coronary artery disease)    Stable      VTE Risk Mitigation (From admission, onward)         Ordered     IP VTE HIGH RISK PATIENT  Once      03/20/20 0245     Place MASOOD hose  Until discontinued      03/20/20 0245     Place sequential compression device  Until discontinued      03/20/20 0245                   Cheryl Domingo MD  Department of Hospital Medicine   [facility]

## 2020-03-20 NOTE — ASSESSMENT & PLAN NOTE
Diastolic. Repeat echo. Diuresis and afterload reduction as tolerated. If echo stable will f/u prn

## 2020-03-20 NOTE — ASSESSMENT & PLAN NOTE
No Cp, No MI. Reports medical Rx by [facility] at [facility] several years ago. Out patient stress test

## 2020-03-20 NOTE — ED TRIAGE NOTES
Pt arrives via EMS acute SOB/cp 81% upon EMS arrival, with rales, pt does not use home O2, pt arrives via EMS on cpap at 96%.  Pt denies fever, flu like s/s, cough or contact with sick people.  4 IN NTG en route

## 2020-03-20 NOTE — CARE UPDATE
Patient seen and examined.  Agree with Dr. Domingo's treatment and plan.  CHF exacerbation.  Probable acute on chronic diastolic heart failure.  Continue IV diuresis.  Diabetes.  Restart scheduled insulin with sliding scale.  Will reassess in Am.

## 2020-03-21 LAB
AORTIC ROOT ANNULUS: 3.79 CM
AORTIC VALVE CUSP SEPERATION: 1.37 CM
ASCENDING AORTA: 3.1 CM
AV INDEX (PROSTH): 0.29
AV MEAN GRADIENT: 24 MMHG
AV PEAK GRADIENT: 37 MMHG
AV VALVE AREA: 1.07 CM2
AV VELOCITY RATIO: 0.28
BSA FOR ECHO PROCEDURE: 2.26 M2
CV ECHO LV RWT: 0.68 CM
DOP CALC AO PEAK VEL: 3.05 M/S
DOP CALC AO VTI: 73.15 CM
DOP CALC LVOT AREA: 3.7 CM2
DOP CALC LVOT DIAMETER: 2.16 CM
DOP CALC LVOT PEAK VEL: 0.84 M/S
DOP CALC LVOT STROKE VOLUME: 78.45 CM3
DOP CALCLVOT PEAK VEL VTI: 21.42 CM
E WAVE DECELERATION TIME: 151.71 MSEC
E/A RATIO: 1.38
E/E' RATIO: 22 M/S
ECHO LV POSTERIOR WALL: 1.67 CM (ref 0.6–1.1)
FRACTIONAL SHORTENING: 25 % (ref 28–44)
INTERVENTRICULAR SEPTUM: 1.75 CM (ref 0.6–1.1)
IVRT: 95.73 MSEC
LA MAJOR: 6.26 CM
LA MINOR: 5.39 CM
LA WIDTH: 4.82 CM
LEFT ATRIUM SIZE: 4.42 CM
LEFT ATRIUM VOLUME INDEX: 47.3 ML/M2
LEFT ATRIUM VOLUME: 104.9 CM3
LEFT INTERNAL DIMENSION IN SYSTOLE: 3.64 CM (ref 2.1–4)
LEFT VENTRICLE DIASTOLIC VOLUME INDEX: 50.33 ML/M2
LEFT VENTRICLE DIASTOLIC VOLUME: 111.58 ML
LEFT VENTRICLE MASS INDEX: 171 G/M2
LEFT VENTRICLE SYSTOLIC VOLUME INDEX: 25.2 ML/M2
LEFT VENTRICLE SYSTOLIC VOLUME: 55.94 ML
LEFT VENTRICULAR INTERNAL DIMENSION IN DIASTOLE: 4.88 CM (ref 3.5–6)
LEFT VENTRICULAR MASS: 379.64 G
LV LATERAL E/E' RATIO: 20.17 M/S
LV SEPTAL E/E' RATIO: 24.2 M/S
MV PEAK A VEL: 0.88 M/S
MV PEAK E VEL: 1.21 M/S
PISA TR MAX VEL: 3.31 M/S
POCT GLUCOSE: 121 MG/DL (ref 70–110)
POCT GLUCOSE: 155 MG/DL (ref 70–110)
POCT GLUCOSE: 159 MG/DL (ref 70–110)
POCT GLUCOSE: 170 MG/DL (ref 70–110)
POCT GLUCOSE: 196 MG/DL (ref 70–110)
POCT GLUCOSE: 76 MG/DL (ref 70–110)
PULM VEIN S/D RATIO: 1.17
PV PEAK D VEL: 0.42 M/S
PV PEAK S VEL: 0.49 M/S
PV PEAK VELOCITY: 1.12 CM/S
RA MAJOR: 5.47 CM
RA PRESSURE: 3 MMHG
RA WIDTH: 3.96 CM
RIGHT VENTRICULAR END-DIASTOLIC DIMENSION: 4.42 CM
RV TISSUE DOPPLER FREE WALL SYSTOLIC VELOCITY 1 (APICAL 4 CHAMBER VIEW): 14.64 CM/S
SINUS: 3.49 CM
STJ: 2.81 CM
TDI LATERAL: 0.06 M/S
TDI SEPTAL: 0.05 M/S
TDI: 0.06 M/S
TR MAX PG: 44 MMHG
TRICUSPID ANNULAR PLANE SYSTOLIC EXCURSION: 2.06 CM
TV REST PULMONARY ARTERY PRESSURE: 47 MMHG

## 2020-03-21 PROCEDURE — 25000003 PHARM REV CODE 250: Performed by: HOSPITALIST

## 2020-03-21 PROCEDURE — 21400001 HC TELEMETRY ROOM

## 2020-03-21 PROCEDURE — 25000003 PHARM REV CODE 250: Performed by: EMERGENCY MEDICINE

## 2020-03-21 PROCEDURE — 63600175 PHARM REV CODE 636 W HCPCS: Performed by: HOSPITALIST

## 2020-03-21 RX ORDER — INSULIN ASPART 100 [IU]/ML
25 INJECTION, SUSPENSION SUBCUTANEOUS 2 TIMES DAILY WITH MEALS
Status: DISCONTINUED | OUTPATIENT
Start: 2020-03-21 | End: 2020-03-25 | Stop reason: HOSPADM

## 2020-03-21 RX ADMIN — CARVEDILOL 12.5 MG: 12.5 TABLET, FILM COATED ORAL at 08:03

## 2020-03-21 RX ADMIN — FUROSEMIDE 40 MG: 10 INJECTION, SOLUTION INTRAMUSCULAR; INTRAVENOUS at 09:03

## 2020-03-21 RX ADMIN — ROSUVASTATIN CALCIUM 10 MG: 10 TABLET, COATED ORAL at 09:03

## 2020-03-21 RX ADMIN — FUROSEMIDE 40 MG: 10 INJECTION, SOLUTION INTRAMUSCULAR; INTRAVENOUS at 08:03

## 2020-03-21 RX ADMIN — TAMSULOSIN HYDROCHLORIDE 0.4 MG: 0.4 CAPSULE ORAL at 12:03

## 2020-03-21 RX ADMIN — LOSARTAN POTASSIUM 100 MG: 25 TABLET ORAL at 09:03

## 2020-03-21 RX ADMIN — ASPIRIN 81 MG: 81 TABLET, COATED ORAL at 12:03

## 2020-03-21 RX ADMIN — INSULIN ASPART 25 UNITS: 100 INJECTION, SUSPENSION SUBCUTANEOUS at 04:03

## 2020-03-21 RX ADMIN — NITROGLYCERIN 1 INCH: 20 OINTMENT TOPICAL at 12:03

## 2020-03-21 RX ADMIN — NITROGLYCERIN 1 INCH: 20 OINTMENT TOPICAL at 06:03

## 2020-03-21 RX ADMIN — INSULIN ASPART 30 UNITS: 100 INJECTION, SUSPENSION SUBCUTANEOUS at 09:03

## 2020-03-21 RX ADMIN — HYDROCHLOROTHIAZIDE 25 MG: 25 TABLET ORAL at 09:03

## 2020-03-21 RX ADMIN — CARVEDILOL 12.5 MG: 12.5 TABLET, FILM COATED ORAL at 09:03

## 2020-03-21 RX ADMIN — AMLODIPINE BESYLATE 10 MG: 5 TABLET ORAL at 12:03

## 2020-03-21 RX ADMIN — POTASSIUM CHLORIDE 20 MEQ: 1.5 SOLUTION ORAL at 09:03

## 2020-03-21 NOTE — PLAN OF CARE
03/21/20 1524   Discharge Assessment   Assessment Type Discharge Planning Assessment   Confirmed/corrected address and phone number on facesheet? Yes   Assessment information obtained from? Patient;Medical Record   Communicated expected length of stay with patient/caregiver no   Prior to hospitilization cognitive status: Alert/Oriented   Prior to hospitalization functional status: Independent;Assistive Equipment  (Uses cane)   Current cognitive status: Alert/Oriented   Current Functional Status: Independent;Assistive Equipment  (cane)   Facility Arrived From: [facility]   Lives With alone   Able to Return to Prior Arrangements yes   Is patient able to care for self after discharge? Yes   Who are your caregiver(s) and their phone number(s)? Dulce-sister: [phone]   Patient's perception of discharge disposition home or selfcare   Readmission Within the Last 30 Days no previous admission in last 30 days   Patient currently being followed by outpatient case management? No   Patient currently receives any other outside agency services? No   Equipment Currently Used at Home cane, straight   Do you have any problems affording any of your prescribed medications? No   Is the patient taking medications as prescribed? yes   Does the patient have transportation home? Yes   Transportation Anticipated health plan transportation  ([employer] transport)   Does the patient receive services at the [facility]? No   Discharge Plan A Home   Discharge Plan B Other  (TBD)   DME Needed Upon Discharge  other (see comments)  (TBD)   Patient/Family in Agreement with Plan yes   SW Role explained to patient; two patient identifiers recognized; SW contact information placed on Communication board. Discussed patient managing health care at home; determined who would be helping patient at home with recovery: Zara helps at home    PCP: Irlanda Valenzuela     Extended Emergency Contact Information  Primary Emergency Contact: Dulce Ramirez    [place] [facility]  Home Phone: [phone]  Relation: Sister     [facility] - [place], [place] - [address]  [address] [place] [address]  Phone: [phone] Fax: [phone]    [id number] - [place], [place] - [address]  [address]  [facility] [address]  Phone: [phone] Fax: [phone]    Payor: [employer] MANAGED MEDICARE / Plan: [employer] [facility] / Product Type: Medicare Advantage /

## 2020-03-21 NOTE — SUBJECTIVE & OBJECTIVE
Interval History: Feeling a little better.    Review of Systems   HENT: Negative for ear discharge and ear pain.    Eyes: Negative for discharge and itching.   Endocrine: Negative for cold intolerance and heat intolerance.   Neurological: Negative for seizures and syncope.     Objective:     Vital Signs (Most Recent):  Temp: 98.6 °F (37 °C) (03/21/20 1118)  Pulse: 77 (03/21/20 1118)  Resp: 17 (03/21/20 1118)  BP: 132/76 (03/21/20 1118)  SpO2: (!) 94 % (03/21/20 1118) Vital Signs (24h Range):  Temp:  [97.6 °F (36.4 °C)-98.6 °F (37 °C)] 98.6 °F (37 °C)  Pulse:  [74-78] 77  Resp:  [16-20] 17  SpO2:  [92 %-95 %] 94 %  BP: (121-167)/(59-92) 132/76     Weight: 102.1 kg (225 lb)  Body mass index is 31.38 kg/m².    Intake/Output Summary (Last 24 hours) at 3/21/2020 1302  Last data filed at 3/21/2020 1215  Gross per 24 hour   Intake 240 ml   Output 3420 ml   Net -3180 ml      Physical Exam   Constitutional: He is oriented to person, place, and time. He appears well-developed and well-nourished.   HENT:   Head: Normocephalic and atraumatic.   Eyes: Pupils are equal, round, and reactive to light. EOM are normal.   Neck: Normal range of motion. Neck supple.   Cardiovascular: Normal rate, regular rhythm, normal heart sounds and intact distal pulses.   No murmur heard.  Pulmonary/Chest: Effort normal. He has no wheezes. He has rales.   Abdominal: Soft. Bowel sounds are normal. He exhibits no distension. There is no tenderness. There is no rebound and no guarding.   Musculoskeletal: Normal range of motion. He exhibits edema. He exhibits no deformity.   Neurological: He is alert and oriented to person, place, and time. No cranial nerve deficit.   Skin: Skin is warm and dry. Capillary refill takes 2 to 3 seconds. No rash noted.   Psychiatric: He has a normal mood and affect. His behavior is normal. Judgment and thought content normal.   Nursing note and vitals reviewed.      Significant Labs:   BMP:   Recent Labs   Lab  03/20/20  0014   *      K 3.2*   CL 99   CO2 29   BUN 14   CREATININE 1.6*   CALCIUM 8.8     CBC:   Recent Labs   Lab 03/20/20  0014   WBC 10.25   HGB 11.3*   HCT 32.9*          Significant Imaging: I have reviewed all pertinent imaging results/findings within the past 24 hours.

## 2020-03-21 NOTE — ASSESSMENT & PLAN NOTE
Echo showing EF of 50%.  Acute on chronic diastolic heart failure.  Started on IV Lasix with good diuresis and improvement of symptoms.  Continue IV diuresis.  Possibly home tomorrow.

## 2020-03-21 NOTE — NURSING
Received bedside handoff from Sherry. Discussed plan of care, pain management and safety measures with patient. Patient free of pain and states he understands plan of care.

## 2020-03-21 NOTE — ASSESSMENT & PLAN NOTE
Resume home insulin at lower dose to avoid hypoglycemia.  Diabetic diet and insulin sliding scale.

## 2020-03-21 NOTE — PLAN OF CARE
Patient rested well throughout the night. Remained pain free and no SOB. Diuresed well this shift  Problem: Fall Injury Risk  Goal: Absence of Fall and Fall-Related Injury  Outcome: Ongoing, Progressing  Intervention: Identify and Manage Contributors to Fall Injury Risk  Flowsheets (Taken 3/21/2020 0501)  Self-Care Promotion: independence encouraged; BADL personal objects within reach; BADL personal routines maintained; safe use of adaptive equipment encouraged  Intervention: Promote Injury-Free Environment  Flowsheets (Taken 3/21/2020 0501)  Safety Promotion/Fall Prevention: assistive device/personal item within reach; bed alarm set; commode/urinal/bedpan at bedside; diversional activities provided; Fall Risk reviewed with patient/family; Fall Risk signage in place; medications reviewed; nonskid shoes/socks when out of bed; side rails raised x 2; supervised activity  Environmental Safety Modification: assistive device/personal items within reach; clutter free environment maintained; lighting adjusted; room organization consistent     Problem: Diabetes Comorbidity  Goal: Blood Glucose Level Within Desired Range  Outcome: Ongoing, Progressing  Intervention: Maintain Glycemic Control  Flowsheets (Taken 3/21/2020 0501)  Glycemic Management: blood glucose monitoring; insulin dose matched to carbohydrate intake     Problem: Adjustment to Illness (Heart Failure)  Goal: Optimal Coping  Outcome: Ongoing, Progressing  Intervention: Support and Optimize Psychosocial Response to Chronic Illness  Flowsheets (Taken 3/21/2020 0501)  Supportive Measures: active listening utilized; decision-making supported; positive reinforcement provided; problem solving facilitated; self-reflection promoted; self-care encouraged; self-responsibility promoted; verbalization of feelings encouraged  Family/Support System Care: self-care encouraged; support provided     Problem: Fluid Imbalance (Heart Failure)  Goal: Fluid Balance  Outcome: Ongoing,  Progressing  Intervention: Monitor and Manage Fluid Balance  Flowsheets (Taken 3/21/2020 0501)  Fluid/Electrolyte Management: fluids restricted     Problem: Functional Ability Impaired (Heart Failure)  Goal: Optimal Functional Ability  Outcome: Ongoing, Progressing  Intervention: Optimize Functional Ability  Flowsheets (Taken 3/21/2020 0501)  Self-Care Promotion: independence encouraged; BADL personal objects within reach; BADL personal routines maintained; safe use of adaptive equipment encouraged  Activity Management: sitting at edge of bed - L2; walk with assistive device and/or staff member - L3; activity adjusted per tolerance; activity clustered for rest period     Problem: Respiratory Compromise (Heart Failure)  Goal: Effective Oxygenation and Ventilation  Outcome: Ongoing, Progressing  Intervention: Promote Airway Secretion Clearance  Flowsheets (Taken 3/21/2020 0501)  Breathing Techniques/Airway Clearance: deep/controlled cough encouraged  Cough And Deep Breathing: done independently per patient  Intervention: Optimize Oxygenation and Ventilation  Flowsheets (Taken 3/21/2020 0501)  Airway/Ventilation Management: airway patency maintained; calming measures promoted

## 2020-03-21 NOTE — HOSPITAL COURSE
69-year-old male with known congestive heart failure, coronary artery disease, hypertension, diabetes, alcohol abuse comes in with over a week of lower extremity bilateral swelling with progressive worsening shortness of breath that really got acutely got worse,. He called 911 emergently as he was so short of breath he could not breathe was found to have O2 sats of 81% and rales.  Patient was placed on BiPAP had nitroglycerin and on arrival to the emergency department he was given Lasix.  Since he has been given Lasix had nitroglycerin paste placed he has diuresed and his respiratory status has much improved.  He was down to 2 L of oxygen and feeling much better.  He denies any fevers coughing in the last week.  Patient be referred for admission for acute flash pulmonary edema and congestive heart failure.patient was  admitted with acute on chronic systolic ,diastolic heart failure. was Started on IV Lasix.  Great diuresis with improvement of symptoms.  Hypokalemia secondary to diuresis and replaced orally.  Has left knee pain,X ray show,DJD and bone infarcts.Probable suprapatellar effusion.  Atherosclerotic vascular disease.had  elevated uric acid 7.8,started on colchicine and IV solumedrol.ortho was consulted,did not required intervention since his symptoms resolved,he was stable on RA at DC time,home lasix prescribed,patient was discharged home with PCP follow up.

## 2020-03-21 NOTE — PROGRESS NOTES
[facility] Hospital Medicine  Progress Note    Patient Name: Chato Bowie  MRN: [id number]  Patient Class: IP- Inpatient   Admission Date: 3/19/2020  Length of Stay: 1 days  Attending Physician: Volodymyr Hunt MD  Primary Care Provider: [facility]        Subjective:     Principal Problem:Acute congestive heart failure        HPI:  69-year-old male with known congestive heart failure, coronary artery disease, hypertension, diabetes, alcohol abuse comes in with over a week of lower extremity bilateral swelling with progressive worsening shortness of breath that really got acutely worse tonight.  He called 911 emergently as he was so short of breath he could not breathe was found to have O2 sats of 81% and rales.  Patient was placed on BiPAP had nitroglycerin and on arrival to the emergency department he was given Lasix.  Since he has been given Lasix had nitroglycerin paste placed he has diuresed and his respiratory status has much improved.  He is currently down to 2 L of oxygen and feeling much better.  He denies any fevers coughing in the last week.  Patient be referred for admission for acute flash pulmonary edema and congestive heart failure.    Overview/Hospital Course:  70 y/o male admitted with acute on chronic diastolic heart failure.  Started on IV Lasix.    Interval History: Feeling a little better.    Review of Systems   HENT: Negative for ear discharge and ear pain.    Eyes: Negative for discharge and itching.   Endocrine: Negative for cold intolerance and heat intolerance.   Neurological: Negative for seizures and syncope.     Objective:     Vital Signs (Most Recent):  Temp: 98.6 °F (37 °C) (03/21/20 1118)  Pulse: 77 (03/21/20 1118)  Resp: 17 (03/21/20 1118)  BP: 132/76 (03/21/20 1118)  SpO2: (!) 94 % (03/21/20 1118) Vital Signs (24h Range):  Temp:  [97.6 °F (36.4 °C)-98.6 °F (37 °C)] 98.6 °F (37 °C)  Pulse:  [74-78] 77  Resp:  [16-20] 17  SpO2:  [92 %-95 %] 94 %  BP:  (121-167)/(59-92) 132/76     Weight: 102.1 kg (225 lb)  Body mass index is 31.38 kg/m².    Intake/Output Summary (Last 24 hours) at 3/21/2020 1302  Last data filed at 3/21/2020 1215  Gross per 24 hour   Intake 240 ml   Output 3420 ml   Net -3180 ml      Physical Exam   Constitutional: He is oriented to person, place, and time. He appears well-developed and well-nourished.   HENT:   Head: Normocephalic and atraumatic.   Eyes: Pupils are equal, round, and reactive to light. EOM are normal.   Neck: Normal range of motion. Neck supple.   Cardiovascular: Normal rate, regular rhythm, normal heart sounds and intact distal pulses.   No murmur heard.  Pulmonary/Chest: Effort normal. He has no wheezes. He has rales.   Abdominal: Soft. Bowel sounds are normal. He exhibits no distension. There is no tenderness. There is no rebound and no guarding.   Musculoskeletal: Normal range of motion. He exhibits edema. He exhibits no deformity.   Neurological: He is alert and oriented to person, place, and time. No cranial nerve deficit.   Skin: Skin is warm and dry. Capillary refill takes 2 to 3 seconds. No rash noted.   Psychiatric: He has a normal mood and affect. His behavior is normal. Judgment and thought content normal.   Nursing note and vitals reviewed.      Significant Labs:   BMP:   Recent Labs   Lab 03/20/20  0014   *      K 3.2*   CL 99   CO2 29   BUN 14   CREATININE 1.6*   CALCIUM 8.8     CBC:   Recent Labs   Lab 03/20/20  0014   WBC 10.25   HGB 11.3*   HCT 32.9*          Significant Imaging: I have reviewed all pertinent imaging results/findings within the past 24 hours.      Assessment/Plan:      * Acute congestive heart failure  Echo showing EF of 50%.  Acute on chronic diastolic heart failure.  Started on IV Lasix with good diuresis and improvement of symptoms.  Continue IV diuresis.  Possibly home tomorrow.      Anemia of chronic disease  H/H stable.      Type 2 diabetes mellitus,  uncontrolled  Resume home insulin at lower dose to avoid hypoglycemia.  Diabetic diet and insulin sliding scale.      Hyperlipidemia  Continue Statin.      Essential hypertension  Resume home meds.  Low NA diet.      CAD (coronary artery disease)  Continue ASA and Statin.      VTE Risk Mitigation (From admission, onward)         Ordered     IP VTE HIGH RISK PATIENT  Once      03/20/20 0245     Place MASOOD hose  Until discontinued      03/20/20 0245     Place sequential compression device  Until discontinued      03/20/20 0245                      Volodymyr Hunt MD  Department of Hospital Medicine   [facility]

## 2020-03-22 LAB
ANION GAP SERPL CALC-SCNC: 12 MMOL/L (ref 8–16)
BUN SERPL-MCNC: 18 MG/DL (ref 8–23)
CALCIUM SERPL-MCNC: 8.6 MG/DL (ref 8.7–10.5)
CHLORIDE SERPL-SCNC: 96 MMOL/L (ref 95–110)
CO2 SERPL-SCNC: 32 MMOL/L (ref 23–29)
CREAT SERPL-MCNC: 1.4 MG/DL (ref 0.5–1.4)
EST. GFR  (AFRICAN AMERICAN): 59 ML/MIN/1.73 M^2
EST. GFR  (NON AFRICAN AMERICAN): 51 ML/MIN/1.73 M^2
GLUCOSE SERPL-MCNC: 99 MG/DL (ref 70–110)
POCT GLUCOSE: 167 MG/DL (ref 70–110)
POCT GLUCOSE: 174 MG/DL (ref 70–110)
POCT GLUCOSE: 178 MG/DL (ref 70–110)
POTASSIUM SERPL-SCNC: 2.4 MMOL/L (ref 3.5–5.1)
SODIUM SERPL-SCNC: 140 MMOL/L (ref 136–145)

## 2020-03-22 PROCEDURE — 80048 BASIC METABOLIC PNL TOTAL CA: CPT

## 2020-03-22 PROCEDURE — 21400001 HC TELEMETRY ROOM

## 2020-03-22 PROCEDURE — 25000003 PHARM REV CODE 250: Performed by: EMERGENCY MEDICINE

## 2020-03-22 PROCEDURE — 63600175 PHARM REV CODE 636 W HCPCS: Performed by: HOSPITALIST

## 2020-03-22 PROCEDURE — 36415 COLL VENOUS BLD VENIPUNCTURE: CPT

## 2020-03-22 PROCEDURE — 25000003 PHARM REV CODE 250: Performed by: HOSPITALIST

## 2020-03-22 RX ORDER — POTASSIUM CHLORIDE 20 MEQ/1
40 TABLET, EXTENDED RELEASE ORAL 2 TIMES DAILY
Status: DISCONTINUED | OUTPATIENT
Start: 2020-03-22 | End: 2020-03-25

## 2020-03-22 RX ADMIN — POTASSIUM CHLORIDE 40 MEQ: 20 TABLET, EXTENDED RELEASE ORAL at 08:03

## 2020-03-22 RX ADMIN — ROSUVASTATIN CALCIUM 10 MG: 10 TABLET, COATED ORAL at 08:03

## 2020-03-22 RX ADMIN — HYDROCHLOROTHIAZIDE 25 MG: 25 TABLET ORAL at 08:03

## 2020-03-22 RX ADMIN — AMLODIPINE BESYLATE 10 MG: 5 TABLET ORAL at 08:03

## 2020-03-22 RX ADMIN — CARVEDILOL 12.5 MG: 12.5 TABLET, FILM COATED ORAL at 08:03

## 2020-03-22 RX ADMIN — ASPIRIN 81 MG: 81 TABLET, COATED ORAL at 08:03

## 2020-03-22 RX ADMIN — TAMSULOSIN HYDROCHLORIDE 0.4 MG: 0.4 CAPSULE ORAL at 08:03

## 2020-03-22 RX ADMIN — LOSARTAN POTASSIUM 100 MG: 25 TABLET ORAL at 08:03

## 2020-03-22 RX ADMIN — INSULIN ASPART 25 UNITS: 100 INJECTION, SUSPENSION SUBCUTANEOUS at 05:03

## 2020-03-22 RX ADMIN — INSULIN ASPART 25 UNITS: 100 INJECTION, SUSPENSION SUBCUTANEOUS at 08:03

## 2020-03-22 RX ADMIN — FUROSEMIDE 40 MG: 10 INJECTION, SOLUTION INTRAMUSCULAR; INTRAVENOUS at 08:03

## 2020-03-22 NOTE — NURSING
Received bedside handoff from Milady.Discussed p[[place] of care, pain management and safety measures with patient.

## 2020-03-22 NOTE — PLAN OF CARE
Patient rested well , diuresed and serum potassium 2,4 called to Dr Hunt- received order for K DUR 40 meq po BID. Discussed potassium replacement with patient.  Problem: Diabetes Comorbidity  Goal: Blood Glucose Level Within Desired Range  Outcome: Ongoing, Progressing  Intervention: Maintain Glycemic Control  Flowsheets (Taken 3/22/2020 0631)  Glycemic Management: blood glucose monitoring; insulin dose matched to carbohydrate intake     Problem: Adjustment to Illness (Heart Failure)  Goal: Optimal Coping  Outcome: Ongoing, Progressing  Intervention: Support and Optimize Psychosocial Response to Chronic Illness  Flowsheets (Taken 3/22/2020 0631)  Supportive Measures: active listening utilized; decision-making supported; positive reinforcement provided; relaxation techniques promoted; self-care encouraged; self-reflection promoted; self-responsibility promoted  Family/Support System Care: self-care encouraged     Problem: Fluid Imbalance (Heart Failure)  Goal: Fluid Balance  Outcome: Ongoing, Progressing     Problem: Functional Ability Impaired (Heart Failure)  Goal: Optimal Functional Ability  Outcome: Ongoing, Progressing  Intervention: Optimize Functional Ability  Flowsheets (Taken 3/22/2020 0631)  Self-Care Promotion: independence encouraged; BADL personal objects within reach; BADL personal routines maintained  Activity Management: ambulated in room - L4     Problem: Respiratory Compromise (Heart Failure)  Goal: Effective Oxygenation and Ventilation  Outcome: Ongoing, Progressing  Intervention: Promote Airway Secretion Clearance  Flowsheets (Taken 3/22/2020 0631)  Breathing Techniques/Airway Clearance: deep/controlled cough encouraged  Cough And Deep Breathing: done independently per patient  Intervention: Optimize Oxygenation and Ventilation  Flowsheets (Taken 3/22/2020 0631)  Airway/Ventilation Management: airway patency maintained; calming measures promoted

## 2020-03-22 NOTE — NURSING
Received report from LUIS Hickey RN. Pt AAOx4, RR even and unlabored, PERRL with no c/o pain. Telemetry monitor in place. Saline lock in place to site is clear. Pt informed of plan of care, oriented to environment and safety maintained with bed low side rails up x2 with nurse call bell within reach w/ bed alarm set

## 2020-03-22 NOTE — SUBJECTIVE & OBJECTIVE
Interval History: Feeling well.    Review of Systems   HENT: Negative for ear discharge and ear pain.    Eyes: Negative for discharge and itching.   Endocrine: Negative for cold intolerance and heat intolerance.   Neurological: Negative for seizures and syncope.     Objective:     Vital Signs (Most Recent):  Temp: 99 °F (37.2 °C) (03/22/20 0810)  Pulse: 85 (03/22/20 0810)  Resp: 18 (03/22/20 0810)  BP: (!) 150/87 (03/22/20 0810)  SpO2: 95 % (03/22/20 0810) Vital Signs (24h Range):  Temp:  [98.4 °F (36.9 °C)-99.4 °F (37.4 °C)] 99 °F (37.2 °C)  Pulse:  [76-85] 85  Resp:  [17-18] 18  SpO2:  [93 %-95 %] 95 %  BP: (132-156)/(73-87) 150/87     Weight: 98.4 kg (216 lb 14.9 oz)  Body mass index is 30.26 kg/m².    Intake/Output Summary (Last 24 hours) at 3/22/2020 1103  Last data filed at 3/22/2020 0600  Gross per 24 hour   Intake --   Output 3425 ml   Net -3425 ml      Physical Exam   Constitutional: He is oriented to person, place, and time. He appears well-developed and well-nourished.   HENT:   Head: Normocephalic and atraumatic.   Eyes: Pupils are equal, round, and reactive to light. EOM are normal.   Neck: Normal range of motion. Neck supple.   Cardiovascular: Normal rate, regular rhythm, normal heart sounds and intact distal pulses.   No murmur heard.  Pulmonary/Chest: Effort normal. He has no wheezes. He has rales.   Abdominal: Soft. Bowel sounds are normal. He exhibits no distension. There is no tenderness. There is no rebound and no guarding.   Musculoskeletal: Normal range of motion. He exhibits edema. He exhibits no deformity.   Neurological: He is alert and oriented to person, place, and time. No cranial nerve deficit.   Skin: Skin is warm and dry. Capillary refill takes 2 to 3 seconds. No rash noted.   Psychiatric: He has a normal mood and affect. His behavior is normal. Judgment and thought content normal.   Nursing note and vitals reviewed.      Significant Labs:   BMP:   Recent Labs   Lab 03/22/20  2892    GLU 99      K 2.4*   CL 96   CO2 32*   BUN 18   CREATININE 1.4   CALCIUM 8.6*     CBC:   No results for input(s): WBC, HGB, HCT, PLT in the last 48 hours.    Significant Imaging: I have reviewed all pertinent imaging results/findings within the past 24 hours.

## 2020-03-22 NOTE — NURSING
Received report from LUIS Hickey RN. Pt AAOx4, RR even and unlabored, PERRL with no c/o pain. Telemetry monitor in place. Saline lock in place to site is clear. Pt  informed of plan of care

## 2020-03-22 NOTE — PLAN OF CARE
Problem: Adult Inpatient Plan of Care  Goal: Plan of Care Review  Outcome: Ongoing, Progressing  Goal: Patient-Specific Goal (Individualization)  Outcome: Ongoing, Progressing  Goal: Absence of Hospital-Acquired Illness or Injury  Outcome: Ongoing, Progressing  Goal: Optimal Comfort and Wellbeing  Outcome: Ongoing, Progressing  Goal: Readiness for Transition of Care  Outcome: Ongoing, Progressing  Goal: Rounds/Family Conference  Outcome: Ongoing, Progressing     Problem: Fall Injury Risk  Goal: Absence of Fall and Fall-Related Injury  Outcome: Ongoing, Progressing     Problem: Diabetes Comorbidity  Goal: Blood Glucose Level Within Desired Range  Outcome: Ongoing, Progressing     Problem: Adjustment to Illness (Heart Failure)  Goal: Optimal Coping  Outcome: Ongoing, Progressing     Problem: Fluid Imbalance (Heart Failure)  Goal: Fluid Balance  Outcome: Ongoing, Progressing     Problem: Functional Ability Impaired (Heart Failure)  Goal: Optimal Functional Ability  Outcome: Ongoing, Progressing     Problem: Respiratory Compromise (Heart Failure)  Goal: Effective Oxygenation and Ventilation  Outcome: Ongoing, Progressing

## 2020-03-22 NOTE — ASSESSMENT & PLAN NOTE
Echo showing EF of 50%.  Acute on chronic diastolic heart failure.  Started on IV Lasix with good diuresis and improvement of symptoms.  Continue IV diuresis.  One more day of IV lasix and home tomorrow.

## 2020-03-22 NOTE — PROGRESS NOTES
[facility] Hospital Medicine  Progress Note    Patient Name: Chato Bowie  MRN: [id number]  Patient Class: IP- Inpatient   Admission Date: 3/19/2020  Length of Stay: 2 days  Attending Physician: Volodymyr Hunt MD  Primary Care Provider: [facility]        Subjective:     Principal Problem:Acute congestive heart failure        HPI:  69-year-old male with known congestive heart failure, coronary artery disease, hypertension, diabetes, alcohol abuse comes in with over a week of lower extremity bilateral swelling with progressive worsening shortness of breath that really got acutely worse tonight.  He called 911 emergently as he was so short of breath he could not breathe was found to have O2 sats of 81% and rales.  Patient was placed on BiPAP had nitroglycerin and on arrival to the emergency department he was given Lasix.  Since he has been given Lasix had nitroglycerin paste placed he has diuresed and his respiratory status has much improved.  He is currently down to 2 L of oxygen and feeling much better.  He denies any fevers coughing in the last week.  Patient be referred for admission for acute flash pulmonary edema and congestive heart failure.    Overview/Hospital Course:  70 y/o male admitted with acute on chronic diastolic heart failure.  Started on IV Lasix.    Interval History: Feeling well.    Review of Systems   HENT: Negative for ear discharge and ear pain.    Eyes: Negative for discharge and itching.   Endocrine: Negative for cold intolerance and heat intolerance.   Neurological: Negative for seizures and syncope.     Objective:     Vital Signs (Most Recent):  Temp: 99 °F (37.2 °C) (03/22/20 0810)  Pulse: 85 (03/22/20 0810)  Resp: 18 (03/22/20 0810)  BP: (!) 150/87 (03/22/20 0810)  SpO2: 95 % (03/22/20 0810) Vital Signs (24h Range):  Temp:  [98.4 °F (36.9 °C)-99.4 °F (37.4 °C)] 99 °F (37.2 °C)  Pulse:  [76-85] 85  Resp:  [17-18] 18  SpO2:  [93 %-95 %] 95 %  BP:  (132-156)/(73-87) 150/87     Weight: 98.4 kg (216 lb 14.9 oz)  Body mass index is 30.26 kg/m².    Intake/Output Summary (Last 24 hours) at 3/22/2020 1103  Last data filed at 3/22/2020 0600  Gross per 24 hour   Intake --   Output 3425 ml   Net -3425 ml      Physical Exam   Constitutional: He is oriented to person, place, and time. He appears well-developed and well-nourished.   HENT:   Head: Normocephalic and atraumatic.   Eyes: Pupils are equal, round, and reactive to light. EOM are normal.   Neck: Normal range of motion. Neck supple.   Cardiovascular: Normal rate, regular rhythm, normal heart sounds and intact distal pulses.   No murmur heard.  Pulmonary/Chest: Effort normal. He has no wheezes. He has rales.   Abdominal: Soft. Bowel sounds are normal. He exhibits no distension. There is no tenderness. There is no rebound and no guarding.   Musculoskeletal: Normal range of motion. He exhibits edema. He exhibits no deformity.   Neurological: He is alert and oriented to person, place, and time. No cranial nerve deficit.   Skin: Skin is warm and dry. Capillary refill takes 2 to 3 seconds. No rash noted.   Psychiatric: He has a normal mood and affect. His behavior is normal. Judgment and thought content normal.   Nursing note and vitals reviewed.      Significant Labs:   BMP:   Recent Labs   Lab 03/22/20  0406   GLU 99      K 2.4*   CL 96   CO2 32*   BUN 18   CREATININE 1.4   CALCIUM 8.6*     CBC:   No results for input(s): WBC, HGB, HCT, PLT in the last 48 hours.    Significant Imaging: I have reviewed all pertinent imaging results/findings within the past 24 hours.      Assessment/Plan:      * Acute congestive heart failure  Echo showing EF of 50%.  Acute on chronic diastolic heart failure.  Started on IV Lasix with good diuresis and improvement of symptoms.  Continue IV diuresis.  One more day of IV lasix and home tomorrow.      Hypokalemia  Secondary to diuresis.  Replace orally and recheck in Am.      Anemia  of chronic disease  H/H stable.      Type 2 diabetes mellitus, uncontrolled  Resume home insulin at lower dose to avoid hypoglycemia.  Diabetic diet and insulin sliding scale.      Hyperlipidemia  Continue Statin.      Essential hypertension  Resume home meds.  Low NA diet.      CAD (coronary artery disease)  Continue ASA and Statin.      VTE Risk Mitigation (From admission, onward)         Ordered     IP VTE HIGH RISK PATIENT  Once      03/20/20 0245     Place MASOOD hose  Until discontinued      03/20/20 0245     Place sequential compression device  Until discontinued      03/20/20 0245                      Volodymyr Hunt MD  Department of Hospital Medicine   [facility]

## 2020-03-23 PROBLEM — M25.562 ACUTE PAIN OF LEFT KNEE: Status: ACTIVE | Noted: 2020-03-23

## 2020-03-23 LAB
ANION GAP SERPL CALC-SCNC: 12 MMOL/L (ref 8–16)
BUN SERPL-MCNC: 20 MG/DL (ref 8–23)
CALCIUM SERPL-MCNC: 8.7 MG/DL (ref 8.7–10.5)
CHLORIDE SERPL-SCNC: 95 MMOL/L (ref 95–110)
CO2 SERPL-SCNC: 30 MMOL/L (ref 23–29)
CREAT SERPL-MCNC: 1.4 MG/DL (ref 0.5–1.4)
EST. GFR  (AFRICAN AMERICAN): 59 ML/MIN/1.73 M^2
EST. GFR  (NON AFRICAN AMERICAN): 51 ML/MIN/1.73 M^2
GLUCOSE SERPL-MCNC: 126 MG/DL (ref 70–110)
POCT GLUCOSE: 180 MG/DL (ref 70–110)
POCT GLUCOSE: 184 MG/DL (ref 70–110)
POCT GLUCOSE: 204 MG/DL (ref 70–110)
POCT GLUCOSE: 262 MG/DL (ref 70–110)
POCT GLUCOSE: 334 MG/DL (ref 70–110)
POTASSIUM SERPL-SCNC: 2.6 MMOL/L (ref 3.5–5.1)
SODIUM SERPL-SCNC: 137 MMOL/L (ref 136–145)
URATE SERPL-MCNC: 7.8 MG/DL (ref 3.4–7)

## 2020-03-23 PROCEDURE — 25000003 PHARM REV CODE 250: Performed by: HOSPITALIST

## 2020-03-23 PROCEDURE — 84550 ASSAY OF BLOOD/URIC ACID: CPT

## 2020-03-23 PROCEDURE — 25000003 PHARM REV CODE 250: Performed by: EMERGENCY MEDICINE

## 2020-03-23 PROCEDURE — 63600175 PHARM REV CODE 636 W HCPCS: Performed by: HOSPITALIST

## 2020-03-23 PROCEDURE — 80048 BASIC METABOLIC PNL TOTAL CA: CPT

## 2020-03-23 PROCEDURE — 21400001 HC TELEMETRY ROOM

## 2020-03-23 PROCEDURE — 36415 COLL VENOUS BLD VENIPUNCTURE: CPT

## 2020-03-23 RX ORDER — CARVEDILOL 12.5 MG/1
25 TABLET ORAL 2 TIMES DAILY
Status: DISCONTINUED | OUTPATIENT
Start: 2020-03-23 | End: 2020-03-25 | Stop reason: HOSPADM

## 2020-03-23 RX ORDER — FUROSEMIDE 40 MG/1
40 TABLET ORAL DAILY
Status: DISCONTINUED | OUTPATIENT
Start: 2020-03-23 | End: 2020-03-23

## 2020-03-23 RX ORDER — PREDNISONE 20 MG/1
20 TABLET ORAL DAILY
Status: DISCONTINUED | OUTPATIENT
Start: 2020-03-23 | End: 2020-03-24

## 2020-03-23 RX ORDER — FUROSEMIDE 40 MG/1
40 TABLET ORAL 2 TIMES DAILY
Status: DISCONTINUED | OUTPATIENT
Start: 2020-03-23 | End: 2020-03-25 | Stop reason: HOSPADM

## 2020-03-23 RX ORDER — GUAIFENESIN/DEXTROMETHORPHAN 100-10MG/5
5 SYRUP ORAL EVERY 6 HOURS
Status: DISCONTINUED | OUTPATIENT
Start: 2020-03-23 | End: 2020-03-25 | Stop reason: HOSPADM

## 2020-03-23 RX ORDER — BENZONATATE 100 MG/1
200 CAPSULE ORAL 3 TIMES DAILY PRN
Status: DISCONTINUED | OUTPATIENT
Start: 2020-03-23 | End: 2020-03-25 | Stop reason: HOSPADM

## 2020-03-23 RX ORDER — COLCHICINE 0.6 MG/1
0.6 TABLET, FILM COATED ORAL DAILY
Status: DISCONTINUED | OUTPATIENT
Start: 2020-03-23 | End: 2020-03-25 | Stop reason: HOSPADM

## 2020-03-23 RX ADMIN — GUAIFENESIN AND DEXTROMETHORPHAN 5 ML: 100; 10 SYRUP ORAL at 11:03

## 2020-03-23 RX ADMIN — POTASSIUM CHLORIDE 40 MEQ: 20 TABLET, EXTENDED RELEASE ORAL at 08:03

## 2020-03-23 RX ADMIN — INSULIN ASPART 25 UNITS: 100 INJECTION, SUSPENSION SUBCUTANEOUS at 09:03

## 2020-03-23 RX ADMIN — FUROSEMIDE 40 MG: 40 TABLET ORAL at 05:03

## 2020-03-23 RX ADMIN — PREDNISONE 20 MG: 20 TABLET ORAL at 10:03

## 2020-03-23 RX ADMIN — HYDROCHLOROTHIAZIDE 25 MG: 25 TABLET ORAL at 08:03

## 2020-03-23 RX ADMIN — ASPIRIN 81 MG: 81 TABLET, COATED ORAL at 08:03

## 2020-03-23 RX ADMIN — LOSARTAN POTASSIUM 100 MG: 25 TABLET ORAL at 08:03

## 2020-03-23 RX ADMIN — FUROSEMIDE 40 MG: 40 TABLET ORAL at 08:03

## 2020-03-23 RX ADMIN — TAMSULOSIN HYDROCHLORIDE 0.4 MG: 0.4 CAPSULE ORAL at 08:03

## 2020-03-23 RX ADMIN — BENZONATATE 200 MG: 100 CAPSULE ORAL at 11:03

## 2020-03-23 RX ADMIN — CARVEDILOL 12.5 MG: 12.5 TABLET, FILM COATED ORAL at 08:03

## 2020-03-23 RX ADMIN — COLCHICINE 0.6 MG: 0.6 TABLET, FILM COATED ORAL at 11:03

## 2020-03-23 RX ADMIN — INSULIN ASPART 25 UNITS: 100 INJECTION, SUSPENSION SUBCUTANEOUS at 05:03

## 2020-03-23 RX ADMIN — ROSUVASTATIN CALCIUM 10 MG: 10 TABLET, COATED ORAL at 08:03

## 2020-03-23 RX ADMIN — GUAIFENESIN AND DEXTROMETHORPHAN 5 ML: 100; 10 SYRUP ORAL at 05:03

## 2020-03-23 RX ADMIN — AMLODIPINE BESYLATE 10 MG: 5 TABLET ORAL at 08:03

## 2020-03-23 RX ADMIN — CARVEDILOL 25 MG: 12.5 TABLET, FILM COATED ORAL at 08:03

## 2020-03-23 NOTE — SUBJECTIVE & OBJECTIVE
Interval History: Complaining of severe pain to left knee.    Review of Systems   HENT: Negative for ear discharge and ear pain.    Eyes: Negative for discharge and itching.   Endocrine: Negative for cold intolerance and heat intolerance.   Neurological: Negative for seizures and syncope.     Objective:     Vital Signs (Most Recent):  Temp: 98.3 °F (36.8 °C) (03/23/20 0755)  Pulse: 77 (03/23/20 0755)  Resp: 18 (03/23/20 0755)  BP: (!) 158/83 (03/23/20 0755)  SpO2: 95 % (03/23/20 0755) Vital Signs (24h Range):  Temp:  [98.3 °F (36.8 °C)-99.5 °F (37.5 °C)] 98.3 °F (36.8 °C)  Pulse:  [71-82] 77  Resp:  [17-18] 18  SpO2:  [94 %-100 %] 95 %  BP: (137-158)/(63-86) 158/83     Weight: 98.4 kg (216 lb 14.9 oz)  Body mass index is 30.26 kg/m².    Intake/Output Summary (Last 24 hours) at 3/23/2020 0938  Last data filed at 3/23/2020 0324  Gross per 24 hour   Intake --   Output 1125 ml   Net -1125 ml      Physical Exam   Constitutional: He is oriented to person, place, and time. He appears well-developed and well-nourished.   HENT:   Head: Normocephalic and atraumatic.   Eyes: Pupils are equal, round, and reactive to light. EOM are normal.   Neck: Normal range of motion. Neck supple.   Cardiovascular: Normal rate, regular rhythm, normal heart sounds and intact distal pulses.   No murmur heard.  Pulmonary/Chest: Effort normal. He has no wheezes. He has rales.   Abdominal: Soft. Bowel sounds are normal. He exhibits no distension. There is no tenderness. There is no rebound and no guarding.   Musculoskeletal: Normal range of motion. He exhibits edema (Improved from admit). He exhibits no deformity.   Left knee swelling, tender to palpation and warm   Neurological: He is alert and oriented to person, place, and time. No cranial nerve deficit.   Skin: Skin is warm and dry. Capillary refill takes 2 to 3 seconds. No rash noted.   Psychiatric: He has a normal mood and affect. His behavior is normal. Judgment and thought content normal.    Nursing note and vitals reviewed.      Significant Labs:   BMP:   Recent Labs   Lab 03/23/20  0519   *      K 2.6*   CL 95   CO2 30*   BUN 20   CREATININE 1.4   CALCIUM 8.7     CBC:   No results for input(s): WBC, HGB, HCT, PLT in the last 48 hours.    Significant Imaging: I have reviewed all pertinent imaging results/findings within the past 24 hours.

## 2020-03-23 NOTE — PROGRESS NOTES
Report received from off going nurse, JOYCE Canales. Patient AAO sitting in chair. No signs of distress noted. Call light in reach. Bed low and locked. Will continue to monitor.

## 2020-03-23 NOTE — ASSESSMENT & PLAN NOTE
Seems like a gout attack secondary to diuretics.  No hx of gout.  Check uric acid.  Knee Xray.  Will start colchicine.  No NSAID's secondary to borderline renal function.  Start Prednisone.

## 2020-03-23 NOTE — PLAN OF CARE
IMM and Medicare appeal process reviewed with patient via telephone; copies to be given to patient and placed in chart       03/23/20 1016   Medicare Message   Important Message from Medicare regarding Discharge Appeal Rights Given to patient/caregiver;Explained to patient/caregiver;Signed/date by patient/caregiver   Date IMM was signed 03/23/20   Time IMM was signed 1011

## 2020-03-23 NOTE — PROGRESS NOTES
[facility] Hospital Medicine  Progress Note    Patient Name: Chato Bowie  MRN: [id number]  Patient Class: IP- Inpatient   Admission Date: 3/19/2020  Length of Stay: 3 days  Attending Physician: Volodymyr Hunt MD  Primary Care Provider: [facility]        Subjective:     Principal Problem:Acute congestive heart failure        HPI:  69-year-old male with known congestive heart failure, coronary artery disease, hypertension, diabetes, alcohol abuse comes in with over a week of lower extremity bilateral swelling with progressive worsening shortness of breath that really got acutely worse tonight.  He called 911 emergently as he was so short of breath he could not breathe was found to have O2 sats of 81% and rales.  Patient was placed on BiPAP had nitroglycerin and on arrival to the emergency department he was given Lasix.  Since he has been given Lasix had nitroglycerin paste placed he has diuresed and his respiratory status has much improved.  He is currently down to 2 L of oxygen and feeling much better.  He denies any fevers coughing in the last week.  Patient be referred for admission for acute flash pulmonary edema and congestive heart failure.    Overview/Hospital Course:  68 y/o male admitted with acute on chronic diastolic heart failure.  Started on IV Lasix.  Great diuresis with improvement of symptoms.  Hypokalemia secondary to diuresis and replaced orally.    Interval History: Complaining of severe pain to left knee.    Review of Systems   HENT: Negative for ear discharge and ear pain.    Eyes: Negative for discharge and itching.   Endocrine: Negative for cold intolerance and heat intolerance.   Neurological: Negative for seizures and syncope.     Objective:     Vital Signs (Most Recent):  Temp: 98.3 °F (36.8 °C) (03/23/20 0755)  Pulse: 77 (03/23/20 0755)  Resp: 18 (03/23/20 0755)  BP: (!) 158/83 (03/23/20 0755)  SpO2: 95 % (03/23/20 0755) Vital Signs (24h Range):  Temp:  [98.3  °F (36.8 °C)-99.5 °F (37.5 °C)] 98.3 °F (36.8 °C)  Pulse:  [71-82] 77  Resp:  [17-18] 18  SpO2:  [94 %-100 %] 95 %  BP: (137-158)/(63-86) 158/83     Weight: 98.4 kg (216 lb 14.9 oz)  Body mass index is 30.26 kg/m².    Intake/Output Summary (Last 24 hours) at 3/23/2020 0938  Last data filed at 3/23/2020 0324  Gross per 24 hour   Intake --   Output 1125 ml   Net -1125 ml      Physical Exam   Constitutional: He is oriented to person, place, and time. He appears well-developed and well-nourished.   HENT:   Head: Normocephalic and atraumatic.   Eyes: Pupils are equal, round, and reactive to light. EOM are normal.   Neck: Normal range of motion. Neck supple.   Cardiovascular: Normal rate, regular rhythm, normal heart sounds and intact distal pulses.   No murmur heard.  Pulmonary/Chest: Effort normal. He has no wheezes. He has rales.   Abdominal: Soft. Bowel sounds are normal. He exhibits no distension. There is no tenderness. There is no rebound and no guarding.   Musculoskeletal: Normal range of motion. He exhibits edema (Improved from admit). He exhibits no deformity.   Left knee swelling, tender to palpation and warm   Neurological: He is alert and oriented to person, place, and time. No cranial nerve deficit.   Skin: Skin is warm and dry. Capillary refill takes 2 to 3 seconds. No rash noted.   Psychiatric: He has a normal mood and affect. His behavior is normal. Judgment and thought content normal.   Nursing note and vitals reviewed.      Significant Labs:   BMP:   Recent Labs   Lab 03/23/20  0519   *      K 2.6*   CL 95   CO2 30*   BUN 20   CREATININE 1.4   CALCIUM 8.7     CBC:   No results for input(s): WBC, HGB, HCT, PLT in the last 48 hours.    Significant Imaging: I have reviewed all pertinent imaging results/findings within the past 24 hours.      Assessment/Plan:      * Acute congestive heart failure  Echo showing EF of 50%.  Acute on chronic diastolic heart failure.  Started on IV Lasix with good  diuresis and improvement of symptoms.  Continued IV diuresis.  Change to oral Lasix.  Continue B blocker and ARB        Acute pain of left knee  Seems like a gout attack secondary to diuretics.  No hx of gout.  Check uric acid.  Knee Xray.  Will start colchicine.  No NSAID's secondary to borderline renal function.  Start Prednisone.    Hypokalemia  Secondary to diuresis.  Replace orally and recheck in Am.      Anemia of chronic disease  H/H stable.      Type 2 diabetes mellitus, uncontrolled  Resume home insulin at lower dose to avoid hypoglycemia.  Diabetic diet and insulin sliding scale.      Hyperlipidemia  Continue Statin.      Essential hypertension  Resume home meds.  Low NA diet.  Will increase Coreg.      CAD (coronary artery disease)  Continue ASA and Statin.      VTE Risk Mitigation (From admission, onward)         Ordered     IP VTE HIGH RISK PATIENT  Once      03/20/20 0245     Place MASOOD hose  Until discontinued      03/20/20 0245     Place sequential compression device  Until discontinued      03/20/20 0245                      Volodymyr Hunt MD  Department of Hospital Medicine   [facility]

## 2020-03-23 NOTE — ASSESSMENT & PLAN NOTE
Echo showing EF of 50%.  Acute on chronic diastolic heart failure.  Started on IV Lasix with good diuresis and improvement of symptoms.  Continued IV diuresis.  Change to oral Lasix.  Continue B blocker and ARB

## 2020-03-23 NOTE — NURSING
Bedside shift report given to Magali. Pt sitting up in chair watching tv. NADN. Respirations even and unlabored. Call light in easy reach.

## 2020-03-24 PROBLEM — I50.43 ACUTE ON CHRONIC COMBINED SYSTOLIC AND DIASTOLIC CONGESTIVE HEART FAILURE: Status: ACTIVE | Noted: 2020-03-20

## 2020-03-24 LAB
ANION GAP SERPL CALC-SCNC: 10 MMOL/L (ref 8–16)
BUN SERPL-MCNC: 27 MG/DL (ref 8–23)
CALCIUM SERPL-MCNC: 8.9 MG/DL (ref 8.7–10.5)
CHLORIDE SERPL-SCNC: 97 MMOL/L (ref 95–110)
CO2 SERPL-SCNC: 31 MMOL/L (ref 23–29)
CREAT SERPL-MCNC: 1.3 MG/DL (ref 0.5–1.4)
EST. GFR  (AFRICAN AMERICAN): >60 ML/MIN/1.73 M^2
EST. GFR  (NON AFRICAN AMERICAN): 56 ML/MIN/1.73 M^2
GLUCOSE SERPL-MCNC: 144 MG/DL (ref 70–110)
POCT GLUCOSE: 213 MG/DL (ref 70–110)
POCT GLUCOSE: 245 MG/DL (ref 70–110)
POCT GLUCOSE: 299 MG/DL (ref 70–110)
POTASSIUM SERPL-SCNC: 2.9 MMOL/L (ref 3.5–5.1)
SODIUM SERPL-SCNC: 138 MMOL/L (ref 136–145)

## 2020-03-24 PROCEDURE — 80048 BASIC METABOLIC PNL TOTAL CA: CPT

## 2020-03-24 PROCEDURE — 63600175 PHARM REV CODE 636 W HCPCS: Performed by: EMERGENCY MEDICINE

## 2020-03-24 PROCEDURE — 25000003 PHARM REV CODE 250: Performed by: EMERGENCY MEDICINE

## 2020-03-24 PROCEDURE — 63600175 PHARM REV CODE 636 W HCPCS: Performed by: HOSPITALIST

## 2020-03-24 PROCEDURE — 36415 COLL VENOUS BLD VENIPUNCTURE: CPT

## 2020-03-24 PROCEDURE — 25000003 PHARM REV CODE 250: Performed by: HOSPITALIST

## 2020-03-24 PROCEDURE — 21400001 HC TELEMETRY ROOM

## 2020-03-24 RX ORDER — METHYLPREDNISOLONE SOD SUCC 125 MG
80 VIAL (EA) INJECTION 2 TIMES DAILY
Status: DISCONTINUED | OUTPATIENT
Start: 2020-03-24 | End: 2020-03-25 | Stop reason: HOSPADM

## 2020-03-24 RX ORDER — FAMOTIDINE 20 MG/1
20 TABLET, FILM COATED ORAL 2 TIMES DAILY
Status: DISCONTINUED | OUTPATIENT
Start: 2020-03-24 | End: 2020-03-25 | Stop reason: HOSPADM

## 2020-03-24 RX ADMIN — FAMOTIDINE 20 MG: 20 TABLET ORAL at 12:03

## 2020-03-24 RX ADMIN — ACETAMINOPHEN 650 MG: 325 TABLET ORAL at 03:03

## 2020-03-24 RX ADMIN — FAMOTIDINE 20 MG: 20 TABLET ORAL at 10:03

## 2020-03-24 RX ADMIN — POTASSIUM CHLORIDE 40 MEQ: 20 TABLET, EXTENDED RELEASE ORAL at 10:03

## 2020-03-24 RX ADMIN — LOSARTAN POTASSIUM 100 MG: 25 TABLET ORAL at 10:03

## 2020-03-24 RX ADMIN — GUAIFENESIN AND DEXTROMETHORPHAN 5 ML: 100; 10 SYRUP ORAL at 01:03

## 2020-03-24 RX ADMIN — GUAIFENESIN AND DEXTROMETHORPHAN 5 ML: 100; 10 SYRUP ORAL at 10:03

## 2020-03-24 RX ADMIN — INSULIN ASPART 1 UNITS: 100 INJECTION, SOLUTION INTRAVENOUS; SUBCUTANEOUS at 01:03

## 2020-03-24 RX ADMIN — FUROSEMIDE 40 MG: 40 TABLET ORAL at 05:03

## 2020-03-24 RX ADMIN — ASPIRIN 81 MG: 81 TABLET, COATED ORAL at 10:03

## 2020-03-24 RX ADMIN — TAMSULOSIN HYDROCHLORIDE 0.4 MG: 0.4 CAPSULE ORAL at 10:03

## 2020-03-24 RX ADMIN — GUAIFENESIN AND DEXTROMETHORPHAN 5 ML: 100; 10 SYRUP ORAL at 05:03

## 2020-03-24 RX ADMIN — AMLODIPINE BESYLATE 10 MG: 5 TABLET ORAL at 10:03

## 2020-03-24 RX ADMIN — COLCHICINE 0.6 MG: 0.6 TABLET, FILM COATED ORAL at 10:03

## 2020-03-24 RX ADMIN — GUAIFENESIN AND DEXTROMETHORPHAN 5 ML: 100; 10 SYRUP ORAL at 12:03

## 2020-03-24 RX ADMIN — INSULIN ASPART 25 UNITS: 100 INJECTION, SUSPENSION SUBCUTANEOUS at 04:03

## 2020-03-24 RX ADMIN — FUROSEMIDE 40 MG: 40 TABLET ORAL at 10:03

## 2020-03-24 RX ADMIN — CARVEDILOL 25 MG: 12.5 TABLET, FILM COATED ORAL at 10:03

## 2020-03-24 RX ADMIN — INSULIN ASPART 3 UNITS: 100 INJECTION, SOLUTION INTRAVENOUS; SUBCUTANEOUS at 04:03

## 2020-03-24 RX ADMIN — ACETAMINOPHEN 650 MG: 325 TABLET ORAL at 10:03

## 2020-03-24 RX ADMIN — METHYLPREDNISOLONE SODIUM SUCCINATE 80 MG: 125 INJECTION, POWDER, FOR SOLUTION INTRAMUSCULAR; INTRAVENOUS at 05:03

## 2020-03-24 RX ADMIN — ROSUVASTATIN CALCIUM 10 MG: 10 TABLET, COATED ORAL at 10:03

## 2020-03-24 RX ADMIN — INSULIN ASPART 2 UNITS: 100 INJECTION, SOLUTION INTRAVENOUS; SUBCUTANEOUS at 12:03

## 2020-03-24 RX ADMIN — PREDNISONE 20 MG: 20 TABLET ORAL at 10:03

## 2020-03-24 NOTE — PROGRESS NOTES
[facility] Hospital Medicine  Progress Note    Patient Name: Chato Boiwe  MRN: [id number]  Patient Class: IP- Inpatient   Admission Date: 3/19/2020  Length of Stay: 4 days  Attending Physician: Raoul Morse MD  Primary Care Provider: [facility]        Subjective:     Principal Problem:Acute on chronic combined systolic and diastolic congestive heart failure        HPI:  69-year-old male with known congestive heart failure, coronary artery disease, hypertension, diabetes, alcohol abuse comes in with over a week of lower extremity bilateral swelling with progressive worsening shortness of breath that really got acutely worse tonight.  He called 911 emergently as he was so short of breath he could not breathe was found to have O2 sats of 81% and rales.  Patient was placed on BiPAP had nitroglycerin and on arrival to the emergency department he was given Lasix.  Since he has been given Lasix had nitroglycerin paste placed he has diuresed and his respiratory status has much improved.  He is currently down to 2 L of oxygen and feeling much better.  He denies any fevers coughing in the last week.  Patient be referred for admission for acute flash pulmonary edema and congestive heart failure.    Overview/Hospital Course:  68 y/o male admitted with acute on chronic diastolic heart failure.  Started on IV Lasix.  Great diuresis with improvement of symptoms.  Hypokalemia secondary to diuresis and replaced orally.  Has left knee pain,X ray show,DJD and bone infarcts.Probable suprapatellar effusion.  Atherosclerotic vascular disease.has elevated uric acid 7.8,started on colchicine and IV solumedrol.ortho is consulted,    Interval History: Complaining of severe pain to left knee.    Review of Systems   HENT: Negative for ear discharge and ear pain.    Eyes: Negative for discharge and itching.   Endocrine: Negative for cold intolerance and heat intolerance.   Neurological: Negative for  seizures and syncope.     Objective:     Vital Signs (Most Recent):  Temp: 98.4 °F (36.9 °C) (03/24/20 1135)  Pulse: 69 (03/24/20 1135)  Resp: 18 (03/24/20 1135)  BP: (!) 148/79 (03/24/20 1135)  SpO2: 95 % (03/24/20 1135) Vital Signs (24h Range):  Temp:  [97.6 °F (36.4 °C)-98.9 °F (37.2 °C)] 98.4 °F (36.9 °C)  Pulse:  [69-82] 69  Resp:  [18] 18  SpO2:  [93 %-97 %] 95 %  BP: (136-148)/(77-86) 148/79     Weight: 98.4 kg (216 lb 14.9 oz)  Body mass index is 30.26 kg/m².    Intake/Output Summary (Last 24 hours) at 3/24/2020 1152  Last data filed at 3/24/2020 1019  Gross per 24 hour   Intake 120 ml   Output 1501 ml   Net -1381 ml      Physical Exam   Constitutional: He is oriented to person, place, and time. He appears well-developed and well-nourished.   HENT:   Head: Normocephalic and atraumatic.   Eyes: Pupils are equal, round, and reactive to light. EOM are normal.   Neck: Normal range of motion. Neck supple.   Cardiovascular: Normal rate, regular rhythm, normal heart sounds and intact distal pulses.   No murmur heard.  Pulmonary/Chest: Effort normal. He has no wheezes. He has rales.   Abdominal: Soft. Bowel sounds are normal. He exhibits no distension. There is no tenderness. There is no rebound and no guarding.   Musculoskeletal: Normal range of motion. He exhibits edema (Improved from admit). He exhibits no deformity.   Left knee swelling, tender to palpation and warm   Neurological: He is alert and oriented to person, place, and time. No cranial nerve deficit.   Skin: Skin is warm and dry. Capillary refill takes 2 to 3 seconds. No rash noted.   Psychiatric: He has a normal mood and affect. His behavior is normal. Judgment and thought content normal.   Nursing note and vitals reviewed.      Significant Labs:   BMP:   Recent Labs   Lab 03/24/20  0706   *      K 2.9*   CL 97   CO2 31*   BUN 27*   CREATININE 1.3   CALCIUM 8.9     CBC:   No results for input(s): WBC, HGB, HCT, PLT in the last 48  hours.    Significant Imaging: I have reviewed all pertinent imaging results/findings within the past 24 hours.      Assessment/Plan:      * Acute on chronic combined systolic and diastolic congestive heart failure  Echo showing EF of 50%.  Acute on chronic diastolic heart failure.  Started on IV Lasix with good diuresis and improvement of symptoms.  Continued IV diuresis.  Change to oral Lasix.  Continue B blocker and ARB        Acute pain of left knee  Seems like a gout attack secondary to diuretics.  No hx of gout.  Check uric acid.  Knee Xray.  Will start colchicine.  No NSAID's secondary to borderline renal function.    Has left knee pain,X ray show,DJD and bone infarcts.Probable suprapatellar effusion.  Atherosclerotic vascular disease.has elevated uric acid 7.8,started on colchicine and IV solumedrol.ortho is consulted,    Hypokalemia  Secondary to diuresis.  Replace orally and recheck in Am.      Anemia of chronic disease  H/H stable.      Type 2 diabetes mellitus, uncontrolled  Resume home insulin at lower dose to avoid hypoglycemia.  Diabetic diet and insulin sliding scale.      Hyperlipidemia  Continue Statin.      Essential hypertension  Resume home meds.  Low NA diet.  Will increase Coreg.      CAD (coronary artery disease)  Continue ASA and Statin.      VTE Risk Mitigation (From admission, onward)         Ordered     IP VTE HIGH RISK PATIENT  Once      03/20/20 0245     Place MASOOD hose  Until discontinued      03/20/20 0245     Place sequential compression device  Until discontinued      03/20/20 0245                      Raoul Morse MD  Department of Hospital Medicine   [facility]

## 2020-03-24 NOTE — NURSING
Report Recieved from off going nurse Aury COOK. Patient is AAO. Bed in lowest position, wheels locked, call light in reach. Will continue to monitor.

## 2020-03-24 NOTE — SUBJECTIVE & OBJECTIVE
Interval History: Complaining of severe pain to left knee.    Review of Systems   HENT: Negative for ear discharge and ear pain.    Eyes: Negative for discharge and itching.   Endocrine: Negative for cold intolerance and heat intolerance.   Neurological: Negative for seizures and syncope.     Objective:     Vital Signs (Most Recent):  Temp: 98.4 °F (36.9 °C) (03/24/20 1135)  Pulse: 69 (03/24/20 1135)  Resp: 18 (03/24/20 1135)  BP: (!) 148/79 (03/24/20 1135)  SpO2: 95 % (03/24/20 1135) Vital Signs (24h Range):  Temp:  [97.6 °F (36.4 °C)-98.9 °F (37.2 °C)] 98.4 °F (36.9 °C)  Pulse:  [69-82] 69  Resp:  [18] 18  SpO2:  [93 %-97 %] 95 %  BP: (136-148)/(77-86) 148/79     Weight: 98.4 kg (216 lb 14.9 oz)  Body mass index is 30.26 kg/m².    Intake/Output Summary (Last 24 hours) at 3/24/2020 1152  Last data filed at 3/24/2020 1019  Gross per 24 hour   Intake 120 ml   Output 1501 ml   Net -1381 ml      Physical Exam   Constitutional: He is oriented to person, place, and time. He appears well-developed and well-nourished.   HENT:   Head: Normocephalic and atraumatic.   Eyes: Pupils are equal, round, and reactive to light. EOM are normal.   Neck: Normal range of motion. Neck supple.   Cardiovascular: Normal rate, regular rhythm, normal heart sounds and intact distal pulses.   No murmur heard.  Pulmonary/Chest: Effort normal. He has no wheezes. He has rales.   Abdominal: Soft. Bowel sounds are normal. He exhibits no distension. There is no tenderness. There is no rebound and no guarding.   Musculoskeletal: Normal range of motion. He exhibits edema (Improved from admit). He exhibits no deformity.   Left knee swelling, tender to palpation and warm   Neurological: He is alert and oriented to person, place, and time. No cranial nerve deficit.   Skin: Skin is warm and dry. Capillary refill takes 2 to 3 seconds. No rash noted.   Psychiatric: He has a normal mood and affect. His behavior is normal. Judgment and thought content normal.    Nursing note and vitals reviewed.      Significant Labs:   BMP:   Recent Labs   Lab 03/24/20  0706   *      K 2.9*   CL 97   CO2 31*   BUN 27*   CREATININE 1.3   CALCIUM 8.9     CBC:   No results for input(s): WBC, HGB, HCT, PLT in the last 48 hours.    Significant Imaging: I have reviewed all pertinent imaging results/findings within the past 24 hours.

## 2020-03-24 NOTE — ASSESSMENT & PLAN NOTE
Seems like a gout attack secondary to diuretics.  No hx of gout.  Check uric acid.  Knee Xray.  Will start colchicine.  No NSAID's secondary to borderline renal function.    Has left knee pain,X ray show,DJD and bone infarcts.Probable suprapatellar effusion.  Atherosclerotic vascular disease.has elevated uric acid 7.8,started on colchicine and IV solumedrol.ortho is consulted,

## 2020-03-24 NOTE — PROGRESS NOTES
Received report from JOSLYN De Los Santos. Patient AAOx4, resting quietly in bed, telemetry monitoring in place, no distress noted. Safety maintained, call light in reach.

## 2020-03-24 NOTE — PLAN OF CARE
TN reviewed discharge planning; 0 changes in previous information; pt continues to agree with current discharge plan and anticipates returning home with 0 needs; received report from [facility] that [facility] had [facility] in past;        03/24/20 1310   Discharge Assessment   Assessment Type Discharge Planning Reassessment   Confirmed/corrected address and phone number on facesheet? Yes   Assessment information obtained from? Medical Record;Patient   Prior to hospitilization cognitive status: Alert/Oriented   Prior to hospitalization functional status: Assistive Equipment;Independent   Current cognitive status: Alert/Oriented   Current Functional Status: Independent;Assistive Equipment   Facility Arrived From:   (Home)   Lives With alone   Able to Return to Prior Arrangements yes   Is patient able to care for self after discharge? Yes   Who are your caregiver(s) and their phone number(s)? Dulce (sister) [phone]   Patient's perception of discharge disposition home or selfcare   Readmission Within the Last 30 Days no previous admission in last 30 days   Patient currently being followed by outpatient case management? No   Patient currently receives any other outside agency services? No   Equipment Currently Used at Home cane, straight   Do you have any problems affording any of your prescribed medications? No   Is the patient taking medications as prescribed? yes   Does the patient have transportation home? Yes   Transportation Anticipated family or friend will provide  ([facility])   Does the patient receive services at the [facility]? No   Discharge Plan A Home   Discharge Plan B   (TBD)   DME Needed Upon Discharge    (TBD)   Patient/Family in Agreement with Plan yes

## 2020-03-24 NOTE — CONSULTS
69-year-old male admitted with congestive heart failure.  Patient reports he has had chronic problems with his left knee and in the past at [facility] underwent aspiration with cortisone injection for arthritis.  It is suspected he has a gout flare up due to his diuresis.    Patient reports he is much improved with pain in range of motion from 24 hr ago after giving Colcrys and steroids.  NSAIDs avoided due to renal condition.    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  Patient has very good range of motion 0-95 with some pain but he reports much improved from yesterday.  There is no erythema or sign of infection.  He is neurologically intact.    Patient has a gouty flare-up of the left knee as well as his arthritis.  He is much improved with Colchrys and steroids today. I have offered him aspiration with cortisone injection and the patient reports it was very painful in the past and would like to avoid this if possible.  As long as he continues to improve, we will avoid aspiration with cortisone injection.  He is ambulating with minimal pain. We will aspirate in a.m. if he is not improved.

## 2020-03-24 NOTE — PROGRESS NOTES
Patient awake, alert, oriented resting comfortably. No signs of distress observed. bed low and locked. Call light in reach. Report given to oncoming nurse, JOYCE Jeffers. 12hour chart check complete.

## 2020-03-24 NOTE — PHYSICIAN QUERY
PT Name: Chato Bowie  MR #: [id number]     Physician Query Form - Diabetic Condition Clarification     CDS/: Mandi Sandhu               Contact information: [email or web address]    This form is a permanent document in the medical record.     Query Date: March 24, 2020    By submitting this query, we are merely seeking further clarification of documentation to reflect the severity of illness of your patient. Please utilize your independent clinical judgment when addressing the question(s) below.    The Medical record reflects the following:     Indicators   Supporting Clinical Findings Location in Medical Record   x Diabetes uncontrolled documented Type 2 diabetes mellitus, uncontrolled  Resume home insulin at lower dose to avoid hypoglycemia.  Diabetic diet and insulin sliding scale. HM note 3/23    x Lab Value(s), POCT glucose value(s)  Hemoglobin A1C- 10.1  Estimated avg glucose 243 Labs 3/20     Treatment                                 Medication      Other       Provider, please specify the meaning of the term uncontrolled:    [  x ] Diabetes mellitus Type 2 with hyperglycemia   [   ] Other diabetes complication (please specify): ____________   [   ]  Clinically Undetermined       Please document in your progress notes daily for the duration of treatment until resolved, and include in your discharge summary.

## 2020-03-25 VITALS
OXYGEN SATURATION: 95 % | HEART RATE: 74 BPM | WEIGHT: 216.94 LBS | HEIGHT: 71 IN | TEMPERATURE: 98 F | DIASTOLIC BLOOD PRESSURE: 77 MMHG | RESPIRATION RATE: 18 BRPM | SYSTOLIC BLOOD PRESSURE: 143 MMHG | BODY MASS INDEX: 30.37 KG/M2

## 2020-03-25 LAB
ANION GAP SERPL CALC-SCNC: 14 MMOL/L (ref 8–16)
BUN SERPL-MCNC: 35 MG/DL (ref 8–23)
CALCIUM SERPL-MCNC: 9.4 MG/DL (ref 8.7–10.5)
CHLORIDE SERPL-SCNC: 97 MMOL/L (ref 95–110)
CO2 SERPL-SCNC: 25 MMOL/L (ref 23–29)
CREAT SERPL-MCNC: 1.6 MG/DL (ref 0.5–1.4)
EST. GFR  (AFRICAN AMERICAN): 50 ML/MIN/1.73 M^2
EST. GFR  (NON AFRICAN AMERICAN): 43 ML/MIN/1.73 M^2
GLUCOSE SERPL-MCNC: 300 MG/DL (ref 70–110)
POCT GLUCOSE: 252 MG/DL (ref 70–110)
POCT GLUCOSE: 303 MG/DL (ref 70–110)
POTASSIUM SERPL-SCNC: 4 MMOL/L (ref 3.5–5.1)
SODIUM SERPL-SCNC: 136 MMOL/L (ref 136–145)

## 2020-03-25 PROCEDURE — 63600175 PHARM REV CODE 636 W HCPCS: Performed by: HOSPITALIST

## 2020-03-25 PROCEDURE — 25000003 PHARM REV CODE 250: Performed by: HOSPITALIST

## 2020-03-25 PROCEDURE — 25000003 PHARM REV CODE 250: Performed by: EMERGENCY MEDICINE

## 2020-03-25 PROCEDURE — 80048 BASIC METABOLIC PNL TOTAL CA: CPT

## 2020-03-25 PROCEDURE — 36415 COLL VENOUS BLD VENIPUNCTURE: CPT

## 2020-03-25 RX ORDER — FUROSEMIDE 40 MG/1
40 TABLET ORAL 2 TIMES DAILY
Qty: 60 TABLET | Refills: 0 | Status: ON HOLD | OUTPATIENT
Start: 2020-03-25 | End: 2020-11-27

## 2020-03-25 RX ORDER — COLCHICINE 0.6 MG/1
0.6 TABLET ORAL DAILY
Qty: 14 TABLET | Refills: 0 | Status: ON HOLD | OUTPATIENT
Start: 2020-03-26 | End: 2020-12-10 | Stop reason: HOSPADM

## 2020-03-25 RX ADMIN — POTASSIUM CHLORIDE 40 MEQ: 20 TABLET, EXTENDED RELEASE ORAL at 08:03

## 2020-03-25 RX ADMIN — FUROSEMIDE 40 MG: 40 TABLET ORAL at 08:03

## 2020-03-25 RX ADMIN — TAMSULOSIN HYDROCHLORIDE 0.4 MG: 0.4 CAPSULE ORAL at 08:03

## 2020-03-25 RX ADMIN — COLCHICINE 0.6 MG: 0.6 TABLET, FILM COATED ORAL at 08:03

## 2020-03-25 RX ADMIN — INSULIN ASPART 25 UNITS: 100 INJECTION, SUSPENSION SUBCUTANEOUS at 08:03

## 2020-03-25 RX ADMIN — GUAIFENESIN AND DEXTROMETHORPHAN 5 ML: 100; 10 SYRUP ORAL at 06:03

## 2020-03-25 RX ADMIN — ROSUVASTATIN CALCIUM 10 MG: 10 TABLET, COATED ORAL at 08:03

## 2020-03-25 RX ADMIN — INSULIN ASPART 4 UNITS: 100 INJECTION, SOLUTION INTRAVENOUS; SUBCUTANEOUS at 08:03

## 2020-03-25 RX ADMIN — LOSARTAN POTASSIUM 100 MG: 25 TABLET ORAL at 08:03

## 2020-03-25 RX ADMIN — CARVEDILOL 25 MG: 12.5 TABLET, FILM COATED ORAL at 08:03

## 2020-03-25 RX ADMIN — AMLODIPINE BESYLATE 10 MG: 5 TABLET ORAL at 08:03

## 2020-03-25 RX ADMIN — ASPIRIN 81 MG: 81 TABLET, COATED ORAL at 08:03

## 2020-03-25 RX ADMIN — FAMOTIDINE 20 MG: 20 TABLET ORAL at 08:03

## 2020-03-25 RX ADMIN — GUAIFENESIN AND DEXTROMETHORPHAN 5 ML: 100; 10 SYRUP ORAL at 11:03

## 2020-03-25 RX ADMIN — METHYLPREDNISOLONE SODIUM SUCCINATE 80 MG: 125 INJECTION, POWDER, FOR SOLUTION INTRAMUSCULAR; INTRAVENOUS at 06:03

## 2020-03-25 NOTE — NURSING
Report given to oncoming nurse Bryon COOK, Patient is resting in bed, no distress noted,. Bed is in lowest position, wheels locked, call light is in reach. 12 hour chart check completed

## 2020-03-25 NOTE — PLAN OF CARE
" Patient provided with educational information on Heart Failure;  Information reviewed via telephone; pt verbalized understanding with teachback method; stated that he will call 911 if "he gets sick again, can't breath, shortness of breath or gain more than 3 lbs a day"     Follow up information reviewed with patient; informed that he will receive a telephone follow up from MD on scheduled date and time; information will also be printed on AVS and given to patient upon discharge    Patient stated that he does not have anyone to pick him up from the hospital; [place] informed patient that nurses can arrange for a cab; nurse De Los Santos notified    Nurse De Los Santos also notified that all discharge planning needs have been addressed and patient can be discharged from  standpoint       03/25/20 1144   Final Note   Assessment Type Final Discharge Note   Anticipated Discharge Disposition Home   What phone number can be called within the next 1-3 days to see how you are doing after discharge? [phone]   Hospital Follow Up  Appt(s) scheduled? Yes   Discharge plans and expectations educations in teach back method with documentation complete? Yes   Post-Acute Status   Post-Acute Authorization Other   Other Status No Post-Acute Service Needs   Discharge Delays None known at this time           "

## 2020-03-25 NOTE — DISCHARGE SUMMARY
[facility] Hospital Medicine  Discharge Summary      Patient Name: Chato Bowie  MRN: [id number]  Admission Date: 3/19/2020  Hospital Length of Stay: 5 days  Discharge Date and Time:  03/25/2020 12:59 PM  Attending Physician: Raoul Morse MD   Discharging Provider: Raoul Morse MD  Primary Care Provider: [facility]      HPI:   69-year-old male with known congestive heart failure, coronary artery disease, hypertension, diabetes, alcohol abuse comes in with over a week of lower extremity bilateral swelling with progressive worsening shortness of breath that really got acutely worse tonight.  He called 911 emergently as he was so short of breath he could not breathe was found to have O2 sats of 81% and rales.  Patient was placed on BiPAP had nitroglycerin and on arrival to the emergency department he was given Lasix.  Since he has been given Lasix had nitroglycerin paste placed he has diuresed and his respiratory status has much improved.  He is currently down to 2 L of oxygen and feeling much better.  He denies any fevers coughing in the last week.  Patient be referred for admission for acute flash pulmonary edema and congestive heart failure.    * No surgery found *      Hospital Course:   69-year-old male with known congestive heart failure, coronary artery disease, hypertension, diabetes, alcohol abuse comes in with over a week of lower extremity bilateral swelling with progressive worsening shortness of breath that really got acutely got worse,. He called 911 emergently as he was so short of breath he could not breathe was found to have O2 sats of 81% and rales.  Patient was placed on BiPAP had nitroglycerin and on arrival to the emergency department he was given Lasix.  Since he has been given Lasix had nitroglycerin paste placed he has diuresed and his respiratory status has much improved.  He was down to 2 L of oxygen and feeling much better.  He denies any fevers  coughing in the last week.  Patient be referred for admission for acute flash pulmonary edema and congestive heart failure.patient was  admitted with acute on chronic systolic ,diastolic heart failure. was Started on IV Lasix.  Great diuresis with improvement of symptoms.  Hypokalemia secondary to diuresis and replaced orally.  Has left knee pain,X ray show,DJD and bone infarcts.Probable suprapatellar effusion.  Atherosclerotic vascular disease.had  elevated uric acid 7.8,started on colchicine and IV solumedrol.ortho was consulted,did not required intervention since his symptoms resolved,he was stable on RA at DC time,home lasix prescribed,patient was discharged home with PCP follow up.     Consults:   Consults (From admission, onward)        Status Ordering Provider     Inpatient consult to Orthopedic Surgery  Once     Provider:  Miguel Dasilva MD    Completed EDWIN NESBITT          No new Assessment & Plan notes have been filed under this hospital service since the last note was generated.  Service: Hospital Medicine    Final Active Diagnoses:    Diagnosis Date Noted POA    PRINCIPAL PROBLEM:  Acute on chronic combined systolic and diastolic congestive heart failure [I50.43] 03/20/2020 Yes    Acute pain of left knee [M25.562] 03/23/2020 Yes    Hypokalemia [E87.6] 02/14/2019 No    Essential hypertension [I10] 03/10/2017 Yes     Chronic    Type 2 diabetes mellitus, uncontrolled [E11.65] 03/10/2017 Yes     Chronic    Anemia of chronic disease [D63.8] 03/10/2017 Yes     Chronic    Hyperlipidemia [E78.5] 03/10/2017 Yes     Chronic    CAD (coronary artery disease) [I25.10] 03/08/2016 Yes     Chronic      Problems Resolved During this Admission:       Discharged Condition: stable    Disposition: Home or Self Care    Follow Up:  Follow-up Information     [facility] On 3/26/2020.    Why:  Telephone follow up  appointment scheduled March 26th @ 6293  Contact information:  [place]  [id number]  [phone]                 Patient Instructions:      Activity as tolerated       Significant Diagnostic Studies: Labs:   BMP:   Recent Labs   Lab 03/24/20  0706 03/25/20  0640   * 300*    136   K 2.9* 4.0   CL 97 97   CO2 31* 25   BUN 27* 35*   CREATININE 1.3 1.6*   CALCIUM 8.9 9.4   , CMP   Recent Labs   Lab 03/24/20  0706 03/25/20  0640    136   K 2.9* 4.0   CL 97 97   CO2 31* 25   * 300*   BUN 27* 35*   CREATININE 1.3 1.6*   CALCIUM 8.9 9.4   ANIONGAP 10 14   ESTGFRAFRICA >60 50*   EGFRNONAA 56* 43*   , CBC No results for input(s): WBC, HGB, HCT, PLT in the last 48 hours. and Troponin   Recent Labs   Lab 03/20/20  0928   TROPONINI 0.026     Radiology: X-Ray: CXR: X-Ray Chest 1 View (CXR): No results found for this visit on 03/19/20., X-Ray Chest PA and Lateral (CXR): No results found for this visit on 03/19/20. and  knee    Pending Diagnostic Studies:     None         Medications:  Reconciled Home Medications:      Medication List      START taking these medications    colchicine 0.6 mg tablet  Commonly known as:  COLCRYS  Take 1 tablet (0.6 mg total) by mouth once daily. for 14 days  Start taking on:  March 26, 2020        CHANGE how you take these medications    carvediloL 12.5 MG tablet  Commonly known as:  COREG  Take 1 tablet (12.5 mg total) by mouth 2 (two) times daily.  What changed:  how much to take     furosemide 40 MG tablet  Commonly known as:  LASIX  Take 1 tablet (40 mg total) by mouth 2 (two) times daily.  What changed:    · medication strength  · how much to take  · how to take this  · when to take this        CONTINUE taking these medications    amLODIPine 10 MG tablet  Commonly known as:  NORVASC  Take 10 mg by mouth once daily.     aspirin 81 MG EC tablet  Commonly known as:  ECOTRIN  Take 1 tablet (81 mg total) by mouth once daily.     aspirin-calcium carbonate 81 mg-300 mg calcium(777 mg) Tab  Take 81 mg by mouth.     atorvastatin 80 MG tablet  Commonly  known as:  LIPITOR  Take 80 mg by mouth every evening.     atropine 1% 1 % Drop  Commonly known as:  ISOPTO ATROPINE  INT 1 GTT INTO OD QD FOR 1 WK     DurezoL 0.05 % Drop ophthalmic solution  Generic drug:  difluprednate  INT 1 GTT INTO OD FID FOR 3 WEEKS     fenofibrate 160 MG Tab  Take 160 mg by mouth.     folic acid 1 MG tablet  Commonly known as:  FOLVITE  Take 1 mg by mouth.     insulin syringe-needle U-100 1 mL 31 gauge x 5/16 Syrg  USE TO INJECT INSULIN TWICE A DAY     lansoprazole 30 MG capsule  Commonly known as:  PREVACID  Take 30 mg by mouth once daily.     losartan-hydrochlorothiazide 100-25 mg 100-25 mg per tablet  Commonly known as:  HYZAAR  Take 1 tablet by mouth once daily.     magnesium oxide 400 mg (241.3 mg magnesium) tablet  Commonly known as:  MAG-OX  Take 800 mg by mouth.     metFORMIN 1000 MG tablet  Commonly known as:  GLUCOPHAGE  Take 1,000 mg by mouth 2 (two) times daily with meals.     NovoLOG Mix 70-30 U-100 Insuln 100 unit/mL (70-30) Soln  Generic drug:  insulin asp prt-insulin aspart (NOVOLOG 70/30)  INJ 55 UNI SC QAM AND 45 UNI QPM     pantoprazole 40 MG tablet  Commonly known as:  PROTONIX  Take 1 tablet (40 mg total) by mouth 2 (two) times daily.     rosuvastatin 40 MG Tab  Commonly known as:  CRESTOR  Take 10 mg by mouth once daily.     sildenafiL 100 MG tablet  Commonly known as:  VIAGRA  Take 1 tablet (100 mg total) by mouth daily as needed for Erectile Dysfunction. *generic tabs*     tamsulosin 0.4 mg Cap  Commonly known as:  FLOMAX  Take 1 capsule (0.4 mg total) by mouth once daily.     terbinafine  mg tablet  Commonly known as:  LAMISIL        STOP taking these medications    potassium chloride 10 MEQ Cpsr  Commonly known as:  MICRO-K            Indwelling Lines/Drains at time of discharge:   Lines/Drains/Airways     None                 Time spent on the discharge of patient: over 30  minutes  Patient was seen and examined on the date of discharge and determined to be  suitable for discharge.         Raoul Morse MD  Department of Hospital Medicine  [facility]

## 2020-03-25 NOTE — PROGRESS NOTES
Report received from off going nurse, JOYCE Slater. Patient AAO. No signs of distress noted. Call light in reach. Bed low and locked. Will continue to monitor.

## 2020-03-25 NOTE — NURSING
Patient discharged to home in stable condition.  Patient left with discharge packet in hand.  Discharge orders and new meds explained to patient.  Patient aware of new meds to be picked up at pharmacy and also which meds were discontinued.  Patient left in wheelchair assisted by patient transporter.  Patient left via taxi with belongings at side.

## 2020-03-28 ENCOUNTER — TELEPHONE (OUTPATIENT)
Dept: UROLOGY | Facility: CLINIC | Age: 70
End: 2020-03-28

## 2020-03-28 NOTE — TELEPHONE ENCOUNTER
Tried to reach pt do to the corona virus appt will need to be urvashi. Letter to contact office mailed out also.-calin

## 2020-09-16 ENCOUNTER — TELEPHONE (OUTPATIENT)
Dept: UROLOGY | Facility: CLINIC | Age: 70
End: 2020-09-16

## 2020-09-16 NOTE — TELEPHONE ENCOUNTER
----- Message from Bell Swain sent at 9/16/2020  2:34 PM CDT -----   Name of Who is Calling:     What is the request in detail:  patient request call back     NFI Please contact to further discuss and advise      Can the clinic reply by [facility]:     What Number to Call Back if not in [facility]:  [phone]

## 2020-11-24 ENCOUNTER — HOSPITAL ENCOUNTER (INPATIENT)
Facility: HOSPITAL | Age: 70
LOS: 3 days | Discharge: HOME HEALTH | DRG: 281 | End: 2020-11-27
Attending: EMERGENCY MEDICINE | Admitting: EMERGENCY MEDICINE
Payer: MEDICARE

## 2020-11-24 DIAGNOSIS — N17.9 AKI (ACUTE KIDNEY INJURY): ICD-10-CM

## 2020-11-24 DIAGNOSIS — R07.9 CHEST PAIN: ICD-10-CM

## 2020-11-24 DIAGNOSIS — R74.8 ELEVATED LIPASE: ICD-10-CM

## 2020-11-24 DIAGNOSIS — I21.4 NSTEMI (NON-ST ELEVATED MYOCARDIAL INFARCTION): Primary | ICD-10-CM

## 2020-11-24 DIAGNOSIS — E83.42 HYPOMAGNESEMIA: ICD-10-CM

## 2020-11-24 DIAGNOSIS — E87.6 HYPOKALEMIA: ICD-10-CM

## 2020-11-24 DIAGNOSIS — R73.9 HYPERGLYCEMIA: ICD-10-CM

## 2020-11-24 PROBLEM — N18.30 CKD (CHRONIC KIDNEY DISEASE), STAGE III: Status: ACTIVE | Noted: 2020-11-24

## 2020-11-24 PROBLEM — N40.0 BPH (BENIGN PROSTATIC HYPERPLASIA): Status: ACTIVE | Noted: 2020-11-24

## 2020-11-24 PROBLEM — I35.0 AORTIC STENOSIS, MODERATE: Status: ACTIVE | Noted: 2020-11-24

## 2020-11-24 PROBLEM — E11.65 TYPE 2 DIABETES MELLITUS WITH HYPERGLYCEMIA: Status: ACTIVE | Noted: 2020-11-24

## 2020-11-24 PROBLEM — N18.30 ACUTE RENAL FAILURE SUPERIMPOSED ON STAGE 3 CHRONIC KIDNEY DISEASE: Status: ACTIVE | Noted: 2020-11-24

## 2020-11-24 PROBLEM — I50.42 CHRONIC COMBINED SYSTOLIC AND DIASTOLIC CONGESTIVE HEART FAILURE: Status: ACTIVE | Noted: 2020-11-24

## 2020-11-24 LAB
ALBUMIN SERPL BCP-MCNC: 3.7 G/DL (ref 3.5–5.2)
ALP SERPL-CCNC: 72 U/L (ref 55–135)
ALT SERPL W/O P-5'-P-CCNC: 28 U/L (ref 10–44)
AMPHET+METHAMPHET UR QL: NEGATIVE
ANION GAP SERPL CALC-SCNC: 18 MMOL/L (ref 8–16)
ANION GAP SERPL CALC-SCNC: 23 MMOL/L (ref 8–16)
APTT BLDCRRT: 26 SEC (ref 21–32)
AST SERPL-CCNC: 47 U/L (ref 10–40)
BACTERIA #/AREA URNS HPF: ABNORMAL /HPF
BARBITURATES UR QL SCN>200 NG/ML: NEGATIVE
BASOPHILS # BLD AUTO: 0.07 K/UL (ref 0–0.2)
BASOPHILS NFR BLD: 0.8 % (ref 0–1.9)
BENZODIAZ UR QL SCN>200 NG/ML: NEGATIVE
BILIRUB SERPL-MCNC: 1.1 MG/DL (ref 0.1–1)
BILIRUB UR QL STRIP: NEGATIVE
BNP SERPL-MCNC: 97 PG/ML (ref 0–99)
BUN SERPL-MCNC: 29 MG/DL (ref 8–23)
BUN SERPL-MCNC: 31 MG/DL (ref 8–23)
BZE UR QL SCN: NEGATIVE
CALCIUM SERPL-MCNC: 8.9 MG/DL (ref 8.7–10.5)
CALCIUM SERPL-MCNC: 8.9 MG/DL (ref 8.7–10.5)
CANNABINOIDS UR QL SCN: NEGATIVE
CHLORIDE SERPL-SCNC: 82 MMOL/L (ref 95–110)
CHLORIDE SERPL-SCNC: 83 MMOL/L (ref 95–110)
CHOLEST SERPL-MCNC: 98 MG/DL (ref 120–199)
CHOLEST/HDLC SERPL: 2.7 {RATIO} (ref 2–5)
CK SERPL-CCNC: 569 U/L (ref 20–200)
CLARITY UR: CLEAR
CO2 SERPL-SCNC: 33 MMOL/L (ref 23–29)
CO2 SERPL-SCNC: 39 MMOL/L (ref 23–29)
COLOR UR: YELLOW
CREAT SERPL-MCNC: 2.3 MG/DL (ref 0.5–1.4)
CREAT SERPL-MCNC: 2.5 MG/DL (ref 0.5–1.4)
CREAT UR-MCNC: 78.9 MG/DL (ref 23–375)
CTP QC/QA: YES
D DIMER PPP IA.FEU-MCNC: 0.51 MG/L FEU
DIFFERENTIAL METHOD: ABNORMAL
EOSINOPHIL # BLD AUTO: 0 K/UL (ref 0–0.5)
EOSINOPHIL NFR BLD: 0.4 % (ref 0–8)
ERYTHROCYTE [DISTWIDTH] IN BLOOD BY AUTOMATED COUNT: 12.4 % (ref 11.5–14.5)
EST. GFR  (AFRICAN AMERICAN): 29 ML/MIN/1.73 M^2
EST. GFR  (AFRICAN AMERICAN): 32 ML/MIN/1.73 M^2
EST. GFR  (NON AFRICAN AMERICAN): 25 ML/MIN/1.73 M^2
EST. GFR  (NON AFRICAN AMERICAN): 28 ML/MIN/1.73 M^2
ETHANOL SERPL-MCNC: 150 MG/DL
GLUCOSE SERPL-MCNC: 253 MG/DL (ref 70–110)
GLUCOSE SERPL-MCNC: 277 MG/DL (ref 70–110)
GLUCOSE UR QL STRIP: ABNORMAL
HCT VFR BLD AUTO: 31.5 % (ref 40–54)
HDLC SERPL-MCNC: 36 MG/DL (ref 40–75)
HDLC SERPL: 36.7 % (ref 20–50)
HGB BLD-MCNC: 11.2 G/DL (ref 14–18)
HGB UR QL STRIP: ABNORMAL
HYALINE CASTS #/AREA URNS LPF: 3 /LPF
IMM GRANULOCYTES # BLD AUTO: 0.04 K/UL (ref 0–0.04)
IMM GRANULOCYTES NFR BLD AUTO: 0.5 % (ref 0–0.5)
INR PPP: 1 (ref 0.8–1.2)
KETONES UR QL STRIP: NEGATIVE
LDLC SERPL CALC-MCNC: 15.2 MG/DL (ref 63–159)
LEUKOCYTE ESTERASE UR QL STRIP: NEGATIVE
LIPASE SERPL-CCNC: 74 U/L (ref 4–60)
LYMPHOCYTES # BLD AUTO: 0.9 K/UL (ref 1–4.8)
LYMPHOCYTES NFR BLD: 10.8 % (ref 18–48)
MAGNESIUM SERPL-MCNC: 1.4 MG/DL (ref 1.6–2.6)
MAGNESIUM SERPL-MCNC: 1.8 MG/DL (ref 1.6–2.6)
MAGNESIUM SERPL-MCNC: 2.1 MG/DL (ref 1.6–2.6)
MCH RBC QN AUTO: 28.1 PG (ref 27–31)
MCHC RBC AUTO-ENTMCNC: 35.6 G/DL (ref 32–36)
MCV RBC AUTO: 79 FL (ref 82–98)
METHADONE UR QL SCN>300 NG/ML: NEGATIVE
MICROSCOPIC COMMENT: ABNORMAL
MONOCYTES # BLD AUTO: 0.9 K/UL (ref 0.3–1)
MONOCYTES NFR BLD: 11.4 % (ref 4–15)
NEUTROPHILS # BLD AUTO: 6.3 K/UL (ref 1.8–7.7)
NEUTROPHILS NFR BLD: 76.1 % (ref 38–73)
NITRITE UR QL STRIP: NEGATIVE
NONHDLC SERPL-MCNC: 62 MG/DL
NRBC BLD-RTO: 0 /100 WBC
OPIATES UR QL SCN: NORMAL
PCP UR QL SCN>25 NG/ML: NEGATIVE
PH UR STRIP: 6 [PH] (ref 5–8)
PHOSPHATE SERPL-MCNC: 2.8 MG/DL (ref 2.7–4.5)
PLATELET # BLD AUTO: 183 K/UL (ref 150–350)
PMV BLD AUTO: 8.7 FL (ref 9.2–12.9)
POCT GLUCOSE: 276 MG/DL (ref 70–110)
POCT GLUCOSE: 304 MG/DL (ref 70–110)
POTASSIUM SERPL-SCNC: 2.3 MMOL/L (ref 3.5–5.1)
POTASSIUM SERPL-SCNC: 2.4 MMOL/L (ref 3.5–5.1)
POTASSIUM SERPL-SCNC: 2.5 MMOL/L (ref 3.5–5.1)
POTASSIUM SERPL-SCNC: <2 MMOL/L (ref 3.5–5.1)
PROT SERPL-MCNC: 7.2 G/DL (ref 6–8.4)
PROT UR QL STRIP: ABNORMAL
PROTHROMBIN TIME: 11 SEC (ref 9–12.5)
RBC # BLD AUTO: 3.98 M/UL (ref 4.6–6.2)
RBC #/AREA URNS HPF: 3 /HPF (ref 0–4)
SARS-COV-2 RDRP RESP QL NAA+PROBE: NEGATIVE
SODIUM SERPL-SCNC: 139 MMOL/L (ref 136–145)
SODIUM SERPL-SCNC: 139 MMOL/L (ref 136–145)
SP GR UR STRIP: 1.01 (ref 1–1.03)
SQUAMOUS #/AREA URNS HPF: 6 /HPF
TOXICOLOGY INFORMATION: NORMAL
TRIGL SERPL-MCNC: 234 MG/DL (ref 30–150)
TROPONIN I SERPL DL<=0.01 NG/ML-MCNC: 0.06 NG/ML (ref 0–0.03)
TROPONIN I SERPL DL<=0.01 NG/ML-MCNC: 0.06 NG/ML (ref 0–0.03)
TROPONIN I SERPL DL<=0.01 NG/ML-MCNC: 0.07 NG/ML (ref 0–0.03)
TSH SERPL DL<=0.005 MIU/L-ACNC: 1.54 UIU/ML (ref 0.4–4)
URN SPEC COLLECT METH UR: ABNORMAL
UROBILINOGEN UR STRIP-ACNC: NEGATIVE EU/DL
WBC # BLD AUTO: 8.27 K/UL (ref 3.9–12.7)
WBC #/AREA URNS HPF: 2 /HPF (ref 0–5)

## 2020-11-24 PROCEDURE — 84132 ASSAY OF SERUM POTASSIUM: CPT | Mod: 91

## 2020-11-24 PROCEDURE — 63600175 PHARM REV CODE 636 W HCPCS: Performed by: HOSPITALIST

## 2020-11-24 PROCEDURE — 81000 URINALYSIS NONAUTO W/SCOPE: CPT | Mod: 59

## 2020-11-24 PROCEDURE — 25000003 PHARM REV CODE 250: Performed by: HOSPITALIST

## 2020-11-24 PROCEDURE — 20000000 HC ICU ROOM

## 2020-11-24 PROCEDURE — 36415 COLL VENOUS BLD VENIPUNCTURE: CPT

## 2020-11-24 PROCEDURE — 83735 ASSAY OF MAGNESIUM: CPT | Mod: 91

## 2020-11-24 PROCEDURE — 96374 THER/PROPH/DIAG INJ IV PUSH: CPT

## 2020-11-24 PROCEDURE — 25000242 PHARM REV CODE 250 ALT 637 W/ HCPCS: Performed by: EMERGENCY MEDICINE

## 2020-11-24 PROCEDURE — 99222 PR INITIAL HOSPITAL CARE,LEVL II: ICD-10-PCS | Mod: ,,, | Performed by: INTERNAL MEDICINE

## 2020-11-24 PROCEDURE — 99291 CRITICAL CARE FIRST HOUR: CPT | Mod: 25

## 2020-11-24 PROCEDURE — 83690 ASSAY OF LIPASE: CPT

## 2020-11-24 PROCEDURE — 85025 COMPLETE CBC W/AUTO DIFF WBC: CPT

## 2020-11-24 PROCEDURE — 99222 1ST HOSP IP/OBS MODERATE 55: CPT | Mod: ,,, | Performed by: INTERNAL MEDICINE

## 2020-11-24 PROCEDURE — 25000003 PHARM REV CODE 250: Performed by: EMERGENCY MEDICINE

## 2020-11-24 PROCEDURE — 84484 ASSAY OF TROPONIN QUANT: CPT | Mod: 91

## 2020-11-24 PROCEDURE — 84443 ASSAY THYROID STIM HORMONE: CPT

## 2020-11-24 PROCEDURE — 93005 ELECTROCARDIOGRAM TRACING: CPT

## 2020-11-24 PROCEDURE — 83735 ASSAY OF MAGNESIUM: CPT

## 2020-11-24 PROCEDURE — 85379 FIBRIN DEGRADATION QUANT: CPT

## 2020-11-24 PROCEDURE — 80048 BASIC METABOLIC PNL TOTAL CA: CPT

## 2020-11-24 PROCEDURE — 85730 THROMBOPLASTIN TIME PARTIAL: CPT

## 2020-11-24 PROCEDURE — 63600175 PHARM REV CODE 636 W HCPCS: Performed by: EMERGENCY MEDICINE

## 2020-11-24 PROCEDURE — 83036 HEMOGLOBIN GLYCOSYLATED A1C: CPT

## 2020-11-24 PROCEDURE — 80053 COMPREHEN METABOLIC PANEL: CPT

## 2020-11-24 PROCEDURE — 80320 DRUG SCREEN QUANTALCOHOLS: CPT

## 2020-11-24 PROCEDURE — 93010 EKG 12-LEAD: ICD-10-PCS | Mod: ,,, | Performed by: INTERNAL MEDICINE

## 2020-11-24 PROCEDURE — 82550 ASSAY OF CK (CPK): CPT

## 2020-11-24 PROCEDURE — 84484 ASSAY OF TROPONIN QUANT: CPT

## 2020-11-24 PROCEDURE — 80307 DRUG TEST PRSMV CHEM ANLYZR: CPT

## 2020-11-24 PROCEDURE — U0002 COVID-19 LAB TEST NON-CDC: HCPCS | Performed by: EMERGENCY MEDICINE

## 2020-11-24 PROCEDURE — 80061 LIPID PANEL: CPT

## 2020-11-24 PROCEDURE — 93010 ELECTROCARDIOGRAM REPORT: CPT | Mod: ,,, | Performed by: INTERNAL MEDICINE

## 2020-11-24 PROCEDURE — 85610 PROTHROMBIN TIME: CPT

## 2020-11-24 PROCEDURE — 83880 ASSAY OF NATRIURETIC PEPTIDE: CPT

## 2020-11-24 PROCEDURE — 84100 ASSAY OF PHOSPHORUS: CPT

## 2020-11-24 RX ORDER — MORPHINE SULFATE 4 MG/ML
4 INJECTION, SOLUTION INTRAMUSCULAR; INTRAVENOUS EVERY 4 HOURS PRN
Status: DISCONTINUED | OUTPATIENT
Start: 2020-11-24 | End: 2020-11-27 | Stop reason: HOSPADM

## 2020-11-24 RX ORDER — LORAZEPAM 2 MG/ML
2 INJECTION INTRAMUSCULAR
Status: DISCONTINUED | OUTPATIENT
Start: 2020-11-24 | End: 2020-11-27 | Stop reason: HOSPADM

## 2020-11-24 RX ORDER — MAGNESIUM SULFATE 1 G/100ML
1 INJECTION INTRAVENOUS ONCE
Status: COMPLETED | OUTPATIENT
Start: 2020-11-24 | End: 2020-11-24

## 2020-11-24 RX ORDER — POTASSIUM CHLORIDE 1.5 G/1.58G
40 POWDER, FOR SOLUTION ORAL
Status: COMPLETED | OUTPATIENT
Start: 2020-11-24 | End: 2020-11-24

## 2020-11-24 RX ORDER — GLUCAGON 1 MG
1 KIT INJECTION
Status: DISCONTINUED | OUTPATIENT
Start: 2020-11-24 | End: 2020-11-27 | Stop reason: HOSPADM

## 2020-11-24 RX ORDER — CLONIDINE HYDROCHLORIDE 0.1 MG/1
0.1 TABLET ORAL EVERY 4 HOURS PRN
Status: DISCONTINUED | OUTPATIENT
Start: 2020-11-24 | End: 2020-11-27 | Stop reason: HOSPADM

## 2020-11-24 RX ORDER — IBUPROFEN 200 MG
24 TABLET ORAL
Status: DISCONTINUED | OUTPATIENT
Start: 2020-11-24 | End: 2020-11-27 | Stop reason: HOSPADM

## 2020-11-24 RX ORDER — METOPROLOL SUCCINATE 25 MG/1
25 TABLET, EXTENDED RELEASE ORAL DAILY
Status: DISCONTINUED | OUTPATIENT
Start: 2020-11-24 | End: 2020-11-27 | Stop reason: HOSPADM

## 2020-11-24 RX ORDER — ASPIRIN 325 MG
325 TABLET, DELAYED RELEASE (ENTERIC COATED) ORAL DAILY
Status: DISCONTINUED | OUTPATIENT
Start: 2020-11-24 | End: 2020-11-27 | Stop reason: HOSPADM

## 2020-11-24 RX ORDER — MAGNESIUM SULFATE 1 G/100ML
1 INJECTION INTRAVENOUS
Status: COMPLETED | OUTPATIENT
Start: 2020-11-24 | End: 2020-11-24

## 2020-11-24 RX ORDER — SODIUM CHLORIDE AND POTASSIUM CHLORIDE 150; 900 MG/100ML; MG/100ML
INJECTION, SOLUTION INTRAVENOUS CONTINUOUS
Status: DISCONTINUED | OUTPATIENT
Start: 2020-11-24 | End: 2020-11-25

## 2020-11-24 RX ORDER — IBUPROFEN 200 MG
16 TABLET ORAL
Status: DISCONTINUED | OUTPATIENT
Start: 2020-11-24 | End: 2020-11-27 | Stop reason: HOSPADM

## 2020-11-24 RX ORDER — ATORVASTATIN CALCIUM 40 MG/1
40 TABLET, FILM COATED ORAL NIGHTLY
Status: DISCONTINUED | OUTPATIENT
Start: 2020-11-24 | End: 2020-11-27 | Stop reason: HOSPADM

## 2020-11-24 RX ORDER — LANOLIN ALCOHOL/MO/W.PET/CERES
100 CREAM (GRAM) TOPICAL DAILY
Status: DISCONTINUED | OUTPATIENT
Start: 2020-11-25 | End: 2020-11-27 | Stop reason: HOSPADM

## 2020-11-24 RX ORDER — NITROGLYCERIN 0.4 MG/1
0.4 TABLET SUBLINGUAL EVERY 5 MIN PRN
Status: COMPLETED | OUTPATIENT
Start: 2020-11-24 | End: 2020-11-24

## 2020-11-24 RX ORDER — HEPARIN SODIUM,PORCINE/D5W 25000/250
12 INTRAVENOUS SOLUTION INTRAVENOUS CONTINUOUS
Status: DISCONTINUED | OUTPATIENT
Start: 2020-11-24 | End: 2020-11-25

## 2020-11-24 RX ORDER — DIAZEPAM 5 MG/1
10 TABLET ORAL EVERY 8 HOURS
Status: DISCONTINUED | OUTPATIENT
Start: 2020-11-24 | End: 2020-11-26

## 2020-11-24 RX ORDER — POTASSIUM CHLORIDE 7.45 MG/ML
10 INJECTION INTRAVENOUS
Status: COMPLETED | OUTPATIENT
Start: 2020-11-24 | End: 2020-11-24

## 2020-11-24 RX ORDER — TAMSULOSIN HYDROCHLORIDE 0.4 MG/1
0.4 CAPSULE ORAL DAILY
Status: DISCONTINUED | OUTPATIENT
Start: 2020-11-25 | End: 2020-11-27 | Stop reason: HOSPADM

## 2020-11-24 RX ORDER — INSULIN ASPART 100 [IU]/ML
0-5 INJECTION, SOLUTION INTRAVENOUS; SUBCUTANEOUS
Status: DISCONTINUED | OUTPATIENT
Start: 2020-11-24 | End: 2020-11-27 | Stop reason: HOSPADM

## 2020-11-24 RX ORDER — FOLIC ACID 1 MG/1
1 TABLET ORAL DAILY
Status: DISCONTINUED | OUTPATIENT
Start: 2020-11-25 | End: 2020-11-27 | Stop reason: HOSPADM

## 2020-11-24 RX ORDER — CLOPIDOGREL 300 MG/1
300 TABLET, FILM COATED ORAL
Status: COMPLETED | OUTPATIENT
Start: 2020-11-24 | End: 2020-11-24

## 2020-11-24 RX ORDER — MORPHINE SULFATE 4 MG/ML
4 INJECTION, SOLUTION INTRAMUSCULAR; INTRAVENOUS
Status: COMPLETED | OUTPATIENT
Start: 2020-11-24 | End: 2020-11-24

## 2020-11-24 RX ORDER — FAMOTIDINE 10 MG/ML
20 INJECTION INTRAVENOUS DAILY
Status: DISCONTINUED | OUTPATIENT
Start: 2020-11-25 | End: 2020-11-27 | Stop reason: HOSPADM

## 2020-11-24 RX ADMIN — SODIUM CHLORIDE AND POTASSIUM CHLORIDE: 9; 1.49 INJECTION, SOLUTION INTRAVENOUS at 06:11

## 2020-11-24 RX ADMIN — POTASSIUM CHLORIDE 10 MEQ: 7.46 INJECTION, SOLUTION INTRAVENOUS at 06:11

## 2020-11-24 RX ADMIN — INSULIN ASPART 2 UNITS: 100 INJECTION, SOLUTION INTRAVENOUS; SUBCUTANEOUS at 08:11

## 2020-11-24 RX ADMIN — CLOPIDOGREL BISULFATE 300 MG: 300 TABLET, FILM COATED ORAL at 05:11

## 2020-11-24 RX ADMIN — MAGNESIUM SULFATE 1 G: 1 INJECTION INTRAVENOUS at 05:11

## 2020-11-24 RX ADMIN — POTASSIUM CHLORIDE 40 MEQ: 1.5 POWDER, FOR SOLUTION ORAL at 05:11

## 2020-11-24 RX ADMIN — ATORVASTATIN CALCIUM 40 MG: 40 TABLET, FILM COATED ORAL at 08:11

## 2020-11-24 RX ADMIN — MORPHINE SULFATE 4 MG: 4 INJECTION INTRAVENOUS at 04:11

## 2020-11-24 RX ADMIN — NITROGLYCERIN 0.4 MG: 0.4 TABLET, ORALLY DISINTEGRATING SUBLINGUAL at 03:11

## 2020-11-24 RX ADMIN — POTASSIUM BICARBONATE 50 MEQ: 978 TABLET, EFFERVESCENT ORAL at 06:11

## 2020-11-24 RX ADMIN — MAGNESIUM SULFATE 1 G: 1 INJECTION INTRAVENOUS at 06:11

## 2020-11-24 RX ADMIN — SODIUM CHLORIDE 500 ML: 0.9 INJECTION, SOLUTION INTRAVENOUS at 05:11

## 2020-11-24 RX ADMIN — HEPARIN SODIUM AND DEXTROSE 12 UNITS/KG/HR: 10000; 5 INJECTION INTRAVENOUS at 05:11

## 2020-11-24 RX ADMIN — POTASSIUM BICARBONATE 50 MEQ: 978 TABLET, EFFERVESCENT ORAL at 08:11

## 2020-11-24 RX ADMIN — METOPROLOL SUCCINATE 25 MG: 25 TABLET, EXTENDED RELEASE ORAL at 07:11

## 2020-11-24 RX ADMIN — DIAZEPAM 10 MG: 5 TABLET ORAL at 08:11

## 2020-11-24 NOTE — ASSESSMENT & PLAN NOTE
Patient with intermittent chest pain with many risk factories in last 3 days,has  Troponin level of 0.063,EKG with non specific T wave changes,cardiology was called ,was planing taking to cath lab,but he has potassium level of less than 2 and magnesioum of 1.4 cardiology can not take patient to cath lab at this time,electrolytes is replaced IV and PO.,he has been also  already on heparin drip.NPO past MN.cardiology is consulted.

## 2020-11-24 NOTE — ASSESSMENT & PLAN NOTE
Minimally abnormal troponin.  Rojas.  Abnormal EKG appears slightly different than previous.  Heparin.  Aspirin Plavix.

## 2020-11-24 NOTE — ASSESSMENT & PLAN NOTE
According to the patient history of a stent many years ago.  Patient will need repeat heart catheterization.  Would like to do 2D echocardiogram to reassess his aortic valve.  There is definitive concern if the stenosis has progressed.  Would need left and right heart catheterization.

## 2020-11-24 NOTE — ED PROVIDER NOTES
"Encounter Date: 11/24/2020    SCRIBE #1 NOTE: I, Jennie Pollock, am scribing for, and in the presence of,  Kristel Mercado MD. I have scribed the following portions of the note - Other sections scribed: HPI, ROS, PE.       History     Chief Complaint   Patient presents with    Dizziness     EMS reports symptoms x 2 days, patient had a pre-syncopal episode yesterday. EMS reports dry cough x 3 days and chest pain on inspiration and palpation     Pre Syncope    Weakness     CC: Chest pain; Light-headedness    69-year-old male with a PMHx of CAD, CHF, DM, and HTN presents to the ED, per EMS, complaining of intermittent chest pain x 3 days. He describes his pain as pressure. CP radiates to his neck. CP exacerbating with deep respiration. Reports associated SOB with CP. Reports emesis with CP yesterday. Today, pt states he felt light-headed while shopping prior to onset of CP around 2 pm. States he was standing at a bus stop when his pain began. States CP lasted about 1 hour. Reports of chest pain at this time. States EMS gave ASA en route. Pt states he does not take NTG or ASA at home. PSHx of cardiac stent placement "years" ago. He does not take blood thinners. No Hx of renal complications. Reports compliance with Metformin, states he takes his potassium pills occasionally. He states he is not really sure what other medications he is taking. NKDA. Reports chronic leg swelling.    The history is provided by the patient.     Review of patient's allergies indicates:  No Known Allergies  Past Medical History:   Diagnosis Date    Acute congestive heart failure 3/20/2020    Alcohol abuse     Arthritis     Coronary artery disease     Diabetes mellitus     Hyperlipemia     Hypertension     Rhabdomyolysis      Past Surgical History:   Procedure Laterality Date    EYE SURGERY      VASCULAR SURGERY       Family History   Problem Relation Age of Onset    Hypertension Mother     Diabetes Mother     Diabetes Father  "    Hypertension Father     Diabetes Sister     Hypertension Sister      Social History     Tobacco Use    Smoking status: Never Smoker    Smokeless tobacco: Never Used   Substance Use Topics    Alcohol use: Yes     Alcohol/week: 4.0 standard drinks     Types: 4 Cans of beer per week    Drug use: No     Review of Systems   Constitutional: Negative for chills and fever.   HENT: Negative for congestion and sore throat.    Eyes: Negative for visual disturbance.   Respiratory: Positive for shortness of breath (with CP). Negative for cough.    Cardiovascular: Positive for chest pain. Negative for leg swelling.   Gastrointestinal: Positive for vomiting (with CP). Negative for abdominal pain and nausea.   Genitourinary: Negative for dysuria.   Musculoskeletal: Positive for neck pain (with CP).   Skin: Negative for rash.   Neurological: Positive for light-headedness. Negative for headaches.   Psychiatric/Behavioral: Negative for confusion.       Physical Exam     Initial Vitals [11/24/20 1439]   BP Pulse Resp Temp SpO2   122/70 84 18 99.1 °F (37.3 °C) 100 %      MAP       --         Physical Exam    Nursing note and vitals reviewed.  Constitutional: He appears well-developed and well-nourished. He is not diaphoretic. No distress.   HENT:   Head: Normocephalic and atraumatic.   Mouth/Throat: Oropharynx is clear and moist.   Eyes: EOM are normal. Pupils are equal, round, and reactive to light.   Neck: Neck supple.   Cardiovascular: Normal rate and regular rhythm.   Murmur heard.   Systolic murmur is present with a grade of 5/6.  Pulmonary/Chest: Breath sounds normal. No respiratory distress.   Abdominal: Soft. Bowel sounds are normal.   Musculoskeletal: No edema.   Neurological: He is alert and oriented to person, place, and time.   Skin: Skin is warm and dry.   Psychiatric: He has a normal mood and affect.         ED Course   Critical Care    Date/Time: 11/24/2020 5:04 PM  Performed by: Kristel Mercado  MD  Authorized by: Kristel Mercado MD   Total critical care time (exclusive of procedural time) : 0 minutes  Comments: Please put in 45 minutes of critical care due to patient having a high risk of cardiac failure.   Separate from teaching and exclusive of procedure and ekg time  Includes:  Time at bedside  Time reviewing test results  Time discussing case with staff  Time documenting the medical record  Time spent with consults  Management          Labs Reviewed   CBC W/ AUTO DIFFERENTIAL - Abnormal; Notable for the following components:       Result Value    RBC 3.98 (*)     Hemoglobin 11.2 (*)     Hematocrit 31.5 (*)     MCV 79 (*)     MPV 8.7 (*)     Lymph # 0.9 (*)     Gran % 76.1 (*)     Lymph % 10.8 (*)     All other components within normal limits   COMPREHENSIVE METABOLIC PANEL - Abnormal; Notable for the following components:    Potassium <2.0 (*)     Chloride 82 (*)     CO2 39 (*)     Glucose 253 (*)     BUN 31 (*)     Creatinine 2.5 (*)     Total Bilirubin 1.1 (*)     AST 47 (*)     Anion Gap 18 (*)     eGFR if  29 (*)     eGFR if non  25 (*)     All other components within normal limits   D DIMER, QUANTITATIVE - Abnormal; Notable for the following components:    D-Dimer 0.51 (*)     All other components within normal limits   TROPONIN I - Abnormal; Notable for the following components:    Troponin I 0.063 (*)     All other components within normal limits   URINALYSIS, REFLEX TO URINE CULTURE - Abnormal; Notable for the following components:    Protein, UA 2+ (*)     Glucose, UA 2+ (*)     Occult Blood UA 2+ (*)     All other components within normal limits    Narrative:     Specimen Source->Urine   MAGNESIUM - Abnormal; Notable for the following components:    Magnesium 1.4 (*)     All other components within normal limits    Narrative:       Mg critical result(s) called and verbal readback obtained from   [facility] by JONATHAN 11/24/2020 16:10   ALCOHOL,MEDICAL  (ETHANOL) - Abnormal; Notable for the following components:    Alcohol, Serum 150 (*)     All other components within normal limits   LIPASE - Abnormal; Notable for the following components:    Lipase 74 (*)     All other components within normal limits   BASIC METABOLIC PANEL - Abnormal; Notable for the following components:    Potassium 2.5 (*)     Chloride 83 (*)     CO2 33 (*)     Glucose 277 (*)     BUN 29 (*)     Creatinine 2.3 (*)     Anion Gap 23 (*)     eGFR if  32 (*)     eGFR if non  28 (*)     All other components within normal limits    Narrative:        POTASSIUM critical result(s) called and verbal readback obtained   from JENIFER STEVENSON  by JONATHAN 11/24/2020 19:31   LIPID PANEL - Abnormal; Notable for the following components:    Cholesterol 98 (*)     Triglycerides 234 (*)     HDL 36 (*)     LDL Cholesterol 15.2 (*)     All other components within normal limits   URINALYSIS MICROSCOPIC - Abnormal; Notable for the following components:    Hyaline Casts, UA 3 (*)     All other components within normal limits    Narrative:     Specimen Source->Urine   B-TYPE NATRIURETIC PEPTIDE   TSH   PROTIME-INR   APTT   DRUG SCREEN PANEL, URINE EMERGENCY    Narrative:     Specimen Source->Urine   ALCOHOL,MEDICAL (ETHANOL)   LIPASE   LIPID PANEL   SARS-COV-2 RDRP GENE     EKG Readings: (Independently Interpreted)   Initial Reading: Ischemia.   Sinus rhythm with first-degree AV block, rate 82 beats per minute, KY interval 228 milliseconds.  No STEMI, patient does have ST depression noted in 2, V5 and V6 which appear more pronounced compared to previous EKG in March of 2020.       Imaging Results          X-Ray Chest AP Portable (Final result)  Result time 11/24/20 16:00:53    Final result by Naina Arreola MD (11/24/20 16:00:53)                 Impression:      No acute abnormalities.      Electronically signed by: Naina Arreola  Date:    11/24/2020  Time:    16:00              Narrative:    EXAMINATION:  XR CHEST AP PORTABLE    CLINICAL HISTORY:  chest pain;    TECHNIQUE:  Single frontal view of the chest was performed.    COMPARISON:  Chest x-ray 03/20/2020    FINDINGS:  The cardiomediastinal silhouette is stable and not significantly enlarged.  The lungs appear clear.  There is no evidence of effusion or pneumothorax.  Osseous structures grossly appear intact.                                 Medical Decision Making:   History:   Old Medical Records: I decided to obtain old medical records.  Initial Assessment:   69-year-old male with history of CAD, CHF, diabetes, hypertension presents to the ED with intermittent chest pain, associated shortness of breath, nausea, vomiting, lightheadedness.  Differential includes not limited to ACS, PE, GERD, musculoskeletal pain.  Workup initiated with labs, D-dimer, cardiac markers.  EKG with ischemic changes, will discuss with Cardiology.  Patient received 325 mg of aspirin EN route with EMS.  Independently Interpreted Test(s):   I have ordered and independently interpreted EKG Reading(s) - see prior notes  Clinical Tests:   Lab Tests: Ordered and Reviewed  Radiological Study: Ordered and Reviewed  Medical Tests: Ordered and Reviewed            Scribe Attestation:   Scribe #1: I performed the above scribed service and the documentation accurately describes the services I performed. I attest to the accuracy of the note.            ED Course as of Nov 24 2138   Tue Nov 24, 2020   1529 Patient's initial EKG reviewed with lots of artifact (performed with portable machine at EMS triage as no beds available)- too much artifact for interpretation. Moved to room 17 and repeat ECG shows some ischemic changes in the inferolateral leads changed compared to ECG 3/2020. Discussed case with Dr. Llamas- no STEMI, will try to alleviate CP with nitro, if no pain relief will call Dr. Llamas back.     [LH]   1614 Patient with continued CP 8/10 despite SL Nitro x 3 and IV  morphine x 4. Labs with severe electrolyte derangement- K <2, Mag 1.2, and with ELIZABETH- discussed with Dr. Llamas- no cath lab activation at this time due to electrolyte derangements. Will replete K IV and PO, Mag IV.     [LH]   1658 Case discussed with Dr. Hampton- will admit to ICU for close monitoring while replacing K. Plavix and heparin gtt ordered. Patient's D-dimer elevated at 0.51- age adjusted D dimer negative.     [LH]      ED Course User Index  [LH] Kristel Mercado MD            Clinical Impression:     ICD-10-CM ICD-9-CM   1. NSTEMI (non-ST elevated myocardial infarction)  I21.4 410.70   2. Chest pain  R07.9 786.50   3. Hypokalemia  E87.6 276.8   4. Hypomagnesemia  E83.42 275.2   5. ELIZABETH (acute kidney injury)  N17.9 584.9   6. Elevated lipase  R74.8 790.5   7. Hyperglycemia  R73.9 790.29                          ED Disposition Condition    Admit                    I, Kristel Mercado MD, personally performed the services described in this documentation. All medical record entries made by the scribe were at my direction and in my presence.  I have reviewed the chart and agree that the record reflects my personal performance and is accurate and complete.    This dictation has been generated using M-Modal Fluency Direct dictation; some phonetic errors may occur.          Kristel Mercado MD  11/24/20 1134

## 2020-11-24 NOTE — SUBJECTIVE & OBJECTIVE
Past Medical History:   Diagnosis Date    Acute congestive heart failure 3/20/2020    Arthritis     Coronary artery disease     Diabetes mellitus     Hyperlipemia     Hypertension        Past Surgical History:   Procedure Laterality Date    EYE SURGERY      VASCULAR SURGERY         Review of patient's allergies indicates:  No Known Allergies    No current facility-administered medications on file prior to encounter.      Current Outpatient Medications on File Prior to Encounter   Medication Sig    amlodipine (NORVASC) 10 MG tablet Take 10 mg by mouth once daily.    aspirin (ECOTRIN) 81 MG EC tablet Take 1 tablet (81 mg total) by mouth once daily.    aspirin-calcium carbonate 81 mg-300 mg calcium(777 mg) Tab Take 81 mg by mouth.    atorvastatin (LIPITOR) 80 MG tablet Take 80 mg by mouth every evening.    atropine 1% (ISOPTO ATROPINE) 1 % Drop INT 1 GTT INTO OD QD FOR 1 WK    carvedilol (COREG) 12.5 MG tablet Take 1 tablet (12.5 mg total) by mouth 2 (two) times daily. (Patient taking differently: Take 25 mg by mouth 2 (two) times daily. )    colchicine (COLCRYS) 0.6 mg tablet Take 1 tablet (0.6 mg total) by mouth once daily. for 14 days    DUREZOL 0.05 % Drop ophthalmic solution INT 1 GTT INTO OD FID FOR 3 WEEKS    fenofibrate 160 MG Tab Take 160 mg by mouth.    folic acid (FOLVITE) 1 MG tablet Take 1 mg by mouth.    furosemide (LASIX) 40 MG tablet Take 1 tablet (40 mg total) by mouth 2 (two) times daily.    insulin syringe-needle U-100 1 mL 31 gauge x 5/16 Syrg USE TO INJECT INSULIN TWICE A DAY    lansoprazole (PREVACID) 30 MG capsule Take 30 mg by mouth once daily.    losartan-hydrochlorothiazide 100-25 mg (HYZAAR) 100-25 mg per tablet Take 1 tablet by mouth once daily.    magnesium oxide (MAG-OX) 400 mg (241.3 mg magnesium) tablet Take 800 mg by mouth.    metformin (GLUCOPHAGE) 1000 MG tablet Take 1,000 mg by mouth 2 (two) times daily with meals.    NOVOLOG MIX 70-30 U-100 INSULN 100  unit/mL (70-30) Soln INJ 55 UNI SC QAM AND 45 UNI QPM    pantoprazole (PROTONIX) 40 MG tablet Take 1 tablet (40 mg total) by mouth 2 (two) times daily.    rosuvastatin (CRESTOR) 40 MG Tab Take 10 mg by mouth once daily.    sildenafil (VIAGRA) 100 MG tablet Take 1 tablet (100 mg total) by mouth daily as needed for Erectile Dysfunction. *generic tabs*    tamsulosin (FLOMAX) 0.4 mg Cap Take 1 capsule (0.4 mg total) by mouth once daily.    terbinafine HCl (LAMISIL) 250 mg tablet      Family History     Problem Relation (Age of Onset)    Diabetes Mother, Father, Sister    Hypertension Mother, Father, Sister        Tobacco Use    Smoking status: Never Smoker    Smokeless tobacco: Never Used   Substance and Sexual Activity    Alcohol use: Yes     Alcohol/week: 4.0 standard drinks     Types: 4 Cans of beer per week    Drug use: No    Sexual activity: Not on file     Review of Systems   Cardiovascular: Positive for chest pain and leg swelling. Negative for dyspnea on exertion, irregular heartbeat, orthopnea, palpitations, paroxysmal nocturnal dyspnea and syncope.   Respiratory: Negative for shortness of breath, sputum production and wheezing.      Objective:     Vital Signs (Most Recent):  Temp: 99.1 °F (37.3 °C) (11/24/20 1439)  Pulse: 89 (11/24/20 1602)  Resp: 20 (11/24/20 1604)  BP: 112/64 (11/24/20 1602)  SpO2: 100 % (11/24/20 1439) Vital Signs (24h Range):  Temp:  [99.1 °F (37.3 °C)] 99.1 °F (37.3 °C)  Pulse:  [81-89] 89  Resp:  [18-24] 20  SpO2:  [100 %] 100 %  BP: (112-139)/(63-77) 112/64     Weight: 95.3 kg (210 lb)  Body mass index is 29.29 kg/m².    SpO2: 100 %  O2 Device (Oxygen Therapy): nasal cannula    No intake or output data in the 24 hours ending 11/24/20 1753    Lines/Drains/Airways     Peripheral Intravenous Line                 Peripheral IV - Single Lumen 11/24/20 1443 18 G Anterior;Left Upper Arm less than 1 day                Physical Exam   Constitutional: He is oriented to person, place,  and time. No distress.   Neck: No JVD present. Carotid bruit is not present.   Cardiovascular: Normal rate, regular rhythm and intact distal pulses.   Murmur heard.   Crescendo systolic murmur is present with a grade of 3/6.  Pulmonary/Chest: Effort normal and breath sounds normal.   Abdominal: Soft. Bowel sounds are normal. There is no abdominal tenderness.   Musculoskeletal:      Right lower leg: Edema present.      Left lower leg: Edema present.   Neurological: He is alert and oriented to person, place, and time.   Skin: He is not diaphoretic.   Psychiatric: He has a normal mood and affect. His speech is normal and behavior is normal.   Vitals reviewed.      Significant Labs:   CMP   Recent Labs   Lab 11/24/20  1519      K <2.0*   CL 82*   CO2 39*   *   BUN 31*   CREATININE 2.5*   CALCIUM 8.9   PROT 7.2   ALBUMIN 3.7   BILITOT 1.1*   ALKPHOS 72   AST 47*   ALT 28   ANIONGAP 18*   ESTGFRAFRICA 29*   EGFRNONAA 25*   , CBC   Recent Labs   Lab 11/24/20  1519   WBC 8.27   HGB 11.2*   HCT 31.5*      , Lipid Panel   Recent Labs   Lab 11/24/20  1519   CHOL 98*   HDL 36*   LDLCALC 15.2*   TRIG 234*   CHOLHDL 36.7    and Troponin   Recent Labs   Lab 11/24/20  1519   TROPONINI 0.063*       Significant Imaging: Echocardiogram:   Transthoracic echo (TTE) complete (Cupid Only):   Results for orders placed or performed during the hospital encounter of 03/19/20   Echo Color Flow Doppler? Yes   Result Value Ref Range    Ascending aorta 3.10 cm    STJ 2.81 cm    AV mean gradient 24 mmHg    Ao peak david 3.05 m/s    Ao VTI 73.15 cm    IVRT 95.73 msec    IVS 1.75 (A) 0.6 - 1.1 cm    LA size 4.42 cm    Left Atrium Major Axis 6.26 cm    Left Atrium Minor Axis 5.39 cm    LVIDd 4.88 3.5 - 6.0 cm    LVIDs 3.64 2.1 - 4.0 cm    LVOT diameter 2.16 cm    LVOT peak VTI 21.42 cm    Posterior Wall 1.67 (A) 0.6 - 1.1 cm    MV Peak A David 0.88 m/s    E wave decelartion time 151.71 msec    MV Peak E David 1.21 m/s    PV Peak D David  0.42 m/s    PV Peak S David 0.49 m/s    RA Major Axis 5.47 cm    RA Width 3.96 cm    RVDD 4.42 cm    Sinus 3.49 cm    TAPSE 2.06 cm    TR Max David 3.31 m/s    TDI LATERAL 0.06 m/s    TDI SEPTAL 0.05 m/s    LA WIDTH 4.82 cm    Ao root annulus 3.79 cm    AORTIC VALVE CUSP SEPERATION 1.37 cm    PV PEAK VELOCITY 1.12 cm/s    LV Diastolic Volume 111.58 mL    LV Systolic Volume 55.94 mL    RV S' 14.64 cm/s    LVOT peak david 0.84 m/s    LV LATERAL E/E' RATIO 20.17 m/s    LV SEPTAL E/E' RATIO 24.20 m/s    FS 25 %    LA volume 104.90 cm3    LV mass 379.64 g    Left Ventricle Relative Wall Thickness 0.68 cm    AV valve area 1.07 cm2    AV Velocity Ratio 0.28     AV index (prosthetic) 0.29     E/A ratio 1.38     Mean e' 0.06 m/s    Pulm vein S/D ratio 1.17     LVOT area 3.7 cm2    LVOT stroke volume 78.45 cm3    AV peak gradient 37 mmHg    E/E' ratio 22.00 m/s    LV Systolic Volume Index 25.2 mL/m2    LV Diastolic Volume Index 50.33 mL/m2    LA Volume Index 47.3 mL/m2    LV Mass Index 171 g/m2    Triscuspid Valve Regurgitation Peak Gradient 44 mmHg    BSA 2.26 m2    Right Atrial Pressure (from IVC) 3 mmHg    TV rest pulmonary artery pressure 47 mmHg    Narrative    · Low normal left ventricular systolic function. The estimated ejection   fraction is 50%.  · Concentric left ventricular hypertrophy.  · Grade II (moderate) left ventricular diastolic dysfunction consistent   with pseudonormalization.  · Normal right ventricular systolic function.  · Moderate left atrial enlargement.  · Moderate aortic valve stenosis.  · Aortic valve area is 1.07 cm2; peak velocity is 3.05 m/s; mean gradient   is 24 mmHg.  · Mild aortic regurgitation.  · Mild mitral regurgitation.  · The estimated PA systolic pressure is 47 mmHg.  · Pulmonary hypertension present.

## 2020-11-24 NOTE — CONSULTS
[facility]  Cardiology  Consult Note    Patient Name: Chato Bowie  MRN: [id number]  Admission Date: 11/24/2020  Hospital Length of Stay: 0 days  Code Status: Prior   Attending Provider: Raoul Morse MD   Consulting Provider: Joshua Llamas MD  Primary Care Physician: [facility]  Principal Problem:NSTEMI (non-ST elevated myocardial infarction)    Patient information was obtained from patient, past medical records and ER records.     Inpatient consult to Cardiology  Consult performed by: Joshua Llamas MD  Consult ordered by: Kristel Mercado MD        Subjective:     Chief Complaint:  Chest pain     HPI:   Chato Bowie is a 69-year-old male who presents with complaints of chest discomfort.  Symptoms started yesterday at rest.  No waxing or waning.  Persistent throughout the night into today.  Remote history of coronary artery disease.  Has been seen by Dr. Steen in the hospital the patient has not followed up in clinic.  EKG shows sinus rhythm with interventricular conduction delay.  Lateral T-wave abnormality that appears slightly worse than previous.  His electrolytes were friendly abnormal.  Hypokalemia.  Hypomagnesemia.  His creatinine is 2.5.  Troponin of 0.063.  With regards to his chest discomfort he has received nitroglycerin without relief.  Last ejection fraction showed an EF of 50%.  Left ventricular hypertrophy with grade 2 diastolic dysfunction.  Moderate aortic stenosis.  Moderate pulmonary hypertension.  BNP normal.  Blood pressure not elevated.  Creatinine from March 1.6.  Denies tobacco or alcohol.    Echocardiogram 03/21/2020:    · Low normal left ventricular systolic function. The estimated ejection fraction is 50%.  · Concentric left ventricular hypertrophy.  · Grade II (moderate) left ventricular diastolic dysfunction consistent with pseudonormalization.  · Normal right ventricular systolic function.  · Moderate left atrial enlargement.  · Moderate  aortic valve stenosis.  · Aortic valve area is 1.07 cm2; peak velocity is 3.05 m/s; mean gradient is 24 mmHg.  · Mild aortic regurgitation.  · Mild mitral regurgitation.  · The estimated PA systolic pressure is 47 mmHg.  · Pulmonary hypertension present.      Past Medical History:   Diagnosis Date    Acute congestive heart failure 3/20/2020    Arthritis     Coronary artery disease     Diabetes mellitus     Hyperlipemia     Hypertension        Past Surgical History:   Procedure Laterality Date    EYE SURGERY      VASCULAR SURGERY         Review of patient's allergies indicates:  No Known Allergies    No current facility-administered medications on file prior to encounter.      Current Outpatient Medications on File Prior to Encounter   Medication Sig    amlodipine (NORVASC) 10 MG tablet Take 10 mg by mouth once daily.    aspirin (ECOTRIN) 81 MG EC tablet Take 1 tablet (81 mg total) by mouth once daily.    aspirin-calcium carbonate 81 mg-300 mg calcium(777 mg) Tab Take 81 mg by mouth.    atorvastatin (LIPITOR) 80 MG tablet Take 80 mg by mouth every evening.    atropine 1% (ISOPTO ATROPINE) 1 % Drop INT 1 GTT INTO OD QD FOR 1 WK    carvedilol (COREG) 12.5 MG tablet Take 1 tablet (12.5 mg total) by mouth 2 (two) times daily. (Patient taking differently: Take 25 mg by mouth 2 (two) times daily. )    colchicine (COLCRYS) 0.6 mg tablet Take 1 tablet (0.6 mg total) by mouth once daily. for 14 days    DUREZOL 0.05 % Drop ophthalmic solution INT 1 GTT INTO OD FID FOR 3 WEEKS    fenofibrate 160 MG Tab Take 160 mg by mouth.    folic acid (FOLVITE) 1 MG tablet Take 1 mg by mouth.    furosemide (LASIX) 40 MG tablet Take 1 tablet (40 mg total) by mouth 2 (two) times daily.    insulin syringe-needle U-100 1 mL 31 gauge x 5/16 Syrg USE TO INJECT INSULIN TWICE A DAY    lansoprazole (PREVACID) 30 MG capsule Take 30 mg by mouth once daily.    losartan-hydrochlorothiazide 100-25 mg (HYZAAR) 100-25 mg per tablet  Take 1 tablet by mouth once daily.    magnesium oxide (MAG-OX) 400 mg (241.3 mg magnesium) tablet Take 800 mg by mouth.    metformin (GLUCOPHAGE) 1000 MG tablet Take 1,000 mg by mouth 2 (two) times daily with meals.    NOVOLOG MIX 70-30 U-100 INSULN 100 unit/mL (70-30) Soln INJ 55 UNI SC QAM AND 45 UNI QPM    pantoprazole (PROTONIX) 40 MG tablet Take 1 tablet (40 mg total) by mouth 2 (two) times daily.    rosuvastatin (CRESTOR) 40 MG Tab Take 10 mg by mouth once daily.    sildenafil (VIAGRA) 100 MG tablet Take 1 tablet (100 mg total) by mouth daily as needed for Erectile Dysfunction. *generic tabs*    tamsulosin (FLOMAX) 0.4 mg Cap Take 1 capsule (0.4 mg total) by mouth once daily.    terbinafine HCl (LAMISIL) 250 mg tablet      Family History     Problem Relation (Age of Onset)    Diabetes Mother, Father, Sister    Hypertension Mother, Father, Sister        Tobacco Use    Smoking status: Never Smoker    Smokeless tobacco: Never Used   Substance and Sexual Activity    Alcohol use: Yes     Alcohol/week: 4.0 standard drinks     Types: 4 Cans of beer per week    Drug use: No    Sexual activity: Not on file     Review of Systems   Cardiovascular: Positive for chest pain and leg swelling. Negative for dyspnea on exertion, irregular heartbeat, orthopnea, palpitations, paroxysmal nocturnal dyspnea and syncope.   Respiratory: Negative for shortness of breath, sputum production and wheezing.      Objective:     Vital Signs (Most Recent):  Temp: 99.1 °F (37.3 °C) (11/24/20 1439)  Pulse: 89 (11/24/20 1602)  Resp: 20 (11/24/20 1604)  BP: 112/64 (11/24/20 1602)  SpO2: 100 % (11/24/20 1439) Vital Signs (24h Range):  Temp:  [99.1 °F (37.3 °C)] 99.1 °F (37.3 °C)  Pulse:  [81-89] 89  Resp:  [18-24] 20  SpO2:  [100 %] 100 %  BP: (112-139)/(63-77) 112/64     Weight: 95.3 kg (210 lb)  Body mass index is 29.29 kg/m².    SpO2: 100 %  O2 Device (Oxygen Therapy): nasal cannula    No intake or output data in the 24 hours ending  11/24/20 1753    Lines/Drains/Airways     Peripheral Intravenous Line                 Peripheral IV - Single Lumen 11/24/20 1443 18 G Anterior;Left Upper Arm less than 1 day                Physical Exam   Constitutional: He is oriented to person, place, and time. No distress.   Neck: No JVD present. Carotid bruit is not present.   Cardiovascular: Normal rate, regular rhythm and intact distal pulses.   Murmur heard.   Crescendo systolic murmur is present with a grade of 3/6.  Pulmonary/Chest: Effort normal and breath sounds normal.   Abdominal: Soft. Bowel sounds are normal. There is no abdominal tenderness.   Musculoskeletal:      Right lower leg: Edema present.      Left lower leg: Edema present.   Neurological: He is alert and oriented to person, place, and time.   Skin: He is not diaphoretic.   Psychiatric: He has a normal mood and affect. His speech is normal and behavior is normal.   Vitals reviewed.      Significant Labs:   CMP   Recent Labs   Lab 11/24/20  1519      K <2.0*   CL 82*   CO2 39*   *   BUN 31*   CREATININE 2.5*   CALCIUM 8.9   PROT 7.2   ALBUMIN 3.7   BILITOT 1.1*   ALKPHOS 72   AST 47*   ALT 28   ANIONGAP 18*   ESTGFRAFRICA 29*   EGFRNONAA 25*   , CBC   Recent Labs   Lab 11/24/20  1519   WBC 8.27   HGB 11.2*   HCT 31.5*      , Lipid Panel   Recent Labs   Lab 11/24/20  1519   CHOL 98*   HDL 36*   LDLCALC 15.2*   TRIG 234*   CHOLHDL 36.7    and Troponin   Recent Labs   Lab 11/24/20  1519   TROPONINI 0.063*       Significant Imaging: Echocardiogram:   Transthoracic echo (TTE) complete (Cupid Only):   Results for orders placed or performed during the hospital encounter of 03/19/20   Echo Color Flow Doppler? Yes   Result Value Ref Range    Ascending aorta 3.10 cm    STJ 2.81 cm    AV mean gradient 24 mmHg    Ao peak chiki 3.05 m/s    Ao VTI 73.15 cm    IVRT 95.73 msec    IVS 1.75 (A) 0.6 - 1.1 cm    LA size 4.42 cm    Left Atrium Major Axis 6.26 cm    Left Atrium Minor Axis 5.39 cm     LVIDd 4.88 3.5 - 6.0 cm    LVIDs 3.64 2.1 - 4.0 cm    LVOT diameter 2.16 cm    LVOT peak VTI 21.42 cm    Posterior Wall 1.67 (A) 0.6 - 1.1 cm    MV Peak A David 0.88 m/s    E wave decelartion time 151.71 msec    MV Peak E David 1.21 m/s    PV Peak D David 0.42 m/s    PV Peak S David 0.49 m/s    RA Major Axis 5.47 cm    RA Width 3.96 cm    RVDD 4.42 cm    Sinus 3.49 cm    TAPSE 2.06 cm    TR Max David 3.31 m/s    TDI LATERAL 0.06 m/s    TDI SEPTAL 0.05 m/s    LA WIDTH 4.82 cm    Ao root annulus 3.79 cm    AORTIC VALVE CUSP SEPERATION 1.37 cm    PV PEAK VELOCITY 1.12 cm/s    LV Diastolic Volume 111.58 mL    LV Systolic Volume 55.94 mL    RV S' 14.64 cm/s    LVOT peak david 0.84 m/s    LV LATERAL E/E' RATIO 20.17 m/s    LV SEPTAL E/E' RATIO 24.20 m/s    FS 25 %    LA volume 104.90 cm3    LV mass 379.64 g    Left Ventricle Relative Wall Thickness 0.68 cm    AV valve area 1.07 cm2    AV Velocity Ratio 0.28     AV index (prosthetic) 0.29     E/A ratio 1.38     Mean e' 0.06 m/s    Pulm vein S/D ratio 1.17     LVOT area 3.7 cm2    LVOT stroke volume 78.45 cm3    AV peak gradient 37 mmHg    E/E' ratio 22.00 m/s    LV Systolic Volume Index 25.2 mL/m2    LV Diastolic Volume Index 50.33 mL/m2    LA Volume Index 47.3 mL/m2    LV Mass Index 171 g/m2    Triscuspid Valve Regurgitation Peak Gradient 44 mmHg    BSA 2.26 m2    Right Atrial Pressure (from IVC) 3 mmHg    TV rest pulmonary artery pressure 47 mmHg    Narrative    · Low normal left ventricular systolic function. The estimated ejection   fraction is 50%.  · Concentric left ventricular hypertrophy.  · Grade II (moderate) left ventricular diastolic dysfunction consistent   with pseudonormalization.  · Normal right ventricular systolic function.  · Moderate left atrial enlargement.  · Moderate aortic valve stenosis.  · Aortic valve area is 1.07 cm2; peak velocity is 3.05 m/s; mean gradient   is 24 mmHg.  · Mild aortic regurgitation.  · Mild mitral regurgitation.  · The estimated PA  systolic pressure is 47 mmHg.  · Pulmonary hypertension present.        Assessment and Plan:     * NSTEMI (non-ST elevated myocardial infarction)  Minimally abnormal troponin.  Rojas.  Abnormal EKG appears slightly different than previous.  Heparin.  Aspirin Plavix.    Aortic stenosis, moderate  Follow-up echocardiogram    Acute renal failure superimposed on stage 3 chronic kidney disease  Electrolyte abnormalities.  Creatinine significantly elevated compared to previous.    Hypokalemia  Replace lytes    Essential hypertension  Monitor.    CAD (coronary artery disease)  According to the patient history of a stent many years ago.  Patient will need repeat heart catheterization.  Would like to do 2D echocardiogram to reassess his aortic valve.  There is definitive concern if the stenosis has progressed.  Would need left and right heart catheterization.        VTE Risk Mitigation (From admission, onward)         Ordered     heparin 25,000 units in dextrose 5% (100 units/ml) IV bolus from bag - ADDITIONAL PRN BOLUS - 60 units/kg (max bolus 4000 units)  As needed (PRN)     Question:  Heparin Infusion Adjustment (DO NOT MODIFY ANSWER)  Answer:  \\[facility]\epic\Images\Pharmacy\HeparinInfusions\heparin LOW INTENSITY nomogram for [facility] [id number].pdf    11/24/20 1657     heparin 25,000 units in dextrose 5% (100 units/ml) IV bolus from bag - ADDITIONAL PRN BOLUS - 30 units/kg (max bolus 4000 units)  As needed (PRN)     Question:  Heparin Infusion Adjustment (DO NOT MODIFY ANSWER)  Answer:  \[facility]\epic\Images\Pharmacy\HeparinInfusions\heparin LOW INTENSITY nomogram for [facility] [id number].pdf    11/24/20 1657     heparin 25,000 units in dextrose 5% 250 mL (100 units/mL) infusion LOW INTENSITY nomogram - [facility]  Continuous     Question:  Heparin Infusion Adjustment (DO NOT MODIFY ANSWER)  Answer:  \[facility]\epic\Images\Pharmacy\HeparinInfusions\heparin LOW INTENSITY nomogram for [facility] [id number].pdf    11/24/20 1657                Thank  you for your consult. I will follow-up with patient. Please contact us if you have any additional questions.    Joshua Llamas MD  Cardiology   [facility]

## 2020-11-24 NOTE — H&P
[facility] Hospital Medicine  History & Physical    Patient Name: Chato Bowie  MRN: [id number]  Admission Date: 11/24/2020  Attending Physician: Raoul Morse    Primary Care Provider: [facility]         Patient information was obtained from patient and ER records.     Subjective:     Principal Problem:NSTEMI (non-ST elevated myocardial infarction)    Chief Complaint:   Chief Complaint   Patient presents with    Dizziness     EMS reports symptoms x 2 days, patient had a pre-syncopal episode yesterday. EMS reports dry cough x 3 days and chest pain on inspiration and palpation     Pre Syncope    Weakness        HPI: 69-year-old male with a PMHx of CAD, CHF with EF of 50%,alcohol abuse,CKD 3,, DM, and HTN presents to the ED, per EMS, complaining of intermittent chest pain x 3 days. He describes his pain as pressure. CP radiates to his neck. CP exacerbating with deep respiration. Reports associated SOB with CP. Reports emesis with CP yesterday. Today, pt states he felt light-headed while shopping prior to onset of CP around 2 pm. States he was standing at a bus stop when his pain began. States CP lasted about 1 hour. Reports of chest pain at this time. States EMS gave ASA en route. Pt states he does not take NTG or ASA at home.in ER has Troponin level of 0.063,EKG wit non specific T wave changes,cardiology was called ,was planing taking to cath lab,but he has potassium level of less than 2 and magnesioum of 1.4 cardiology can not take patient to cath lab at this time,electrolytes is replaced IV and PO.he has been also started on IVF for ARF on CKD 3,he is hemodynamically stable and he is on RA,he is already on heparin drip.his last alcohol consumption was yesterday,drink 2 pint of whisky.    Past Medical History:   Diagnosis Date    Acute congestive heart failure 3/20/2020    Arthritis     Coronary artery disease     Diabetes mellitus     Hyperlipemia     Hypertension         Past Surgical History:   Procedure Laterality Date    EYE SURGERY      VASCULAR SURGERY         Review of patient's allergies indicates:  No Known Allergies    No current facility-administered medications on file prior to encounter.      Current Outpatient Medications on File Prior to Encounter   Medication Sig    amlodipine (NORVASC) 10 MG tablet Take 10 mg by mouth once daily.    aspirin (ECOTRIN) 81 MG EC tablet Take 1 tablet (81 mg total) by mouth once daily.    aspirin-calcium carbonate 81 mg-300 mg calcium(777 mg) Tab Take 81 mg by mouth.    atorvastatin (LIPITOR) 80 MG tablet Take 80 mg by mouth every evening.    atropine 1% (ISOPTO ATROPINE) 1 % Drop INT 1 GTT INTO OD QD FOR 1 WK    carvedilol (COREG) 12.5 MG tablet Take 1 tablet (12.5 mg total) by mouth 2 (two) times daily. (Patient taking differently: Take 25 mg by mouth 2 (two) times daily. )    colchicine (COLCRYS) 0.6 mg tablet Take 1 tablet (0.6 mg total) by mouth once daily. for 14 days    DUREZOL 0.05 % Drop ophthalmic solution INT 1 GTT INTO OD FID FOR 3 WEEKS    fenofibrate 160 MG Tab Take 160 mg by mouth.    folic acid (FOLVITE) 1 MG tablet Take 1 mg by mouth.    furosemide (LASIX) 40 MG tablet Take 1 tablet (40 mg total) by mouth 2 (two) times daily.    insulin syringe-needle U-100 1 mL 31 gauge x 5/16 Syrg USE TO INJECT INSULIN TWICE A DAY    lansoprazole (PREVACID) 30 MG capsule Take 30 mg by mouth once daily.    losartan-hydrochlorothiazide 100-25 mg (HYZAAR) 100-25 mg per tablet Take 1 tablet by mouth once daily.    magnesium oxide (MAG-OX) 400 mg (241.3 mg magnesium) tablet Take 800 mg by mouth.    metformin (GLUCOPHAGE) 1000 MG tablet Take 1,000 mg by mouth 2 (two) times daily with meals.    NOVOLOG MIX 70-30 U-100 INSULN 100 unit/mL (70-30) Soln INJ 55 UNI SC QAM AND 45 UNI QPM    pantoprazole (PROTONIX) 40 MG tablet Take 1 tablet (40 mg total) by mouth 2 (two) times daily.    rosuvastatin (CRESTOR) 40 MG Tab  Take 10 mg by mouth once daily.    sildenafil (VIAGRA) 100 MG tablet Take 1 tablet (100 mg total) by mouth daily as needed for Erectile Dysfunction. *generic tabs*    tamsulosin (FLOMAX) 0.4 mg Cap Take 1 capsule (0.4 mg total) by mouth once daily.    terbinafine HCl (LAMISIL) 250 mg tablet      Family History     Problem Relation (Age of Onset)    Diabetes Mother, Father, Sister    Hypertension Mother, Father, Sister        Tobacco Use    Smoking status: Never Smoker    Smokeless tobacco: Never Used   Substance and Sexual Activity    Alcohol use: Yes     Alcohol/week: 4.0 standard drinks     Types: 4 Cans of beer per week    Drug use: No    Sexual activity: Not on file     Review of Systems   Constitutional: Positive for activity change and appetite change.   HENT: Negative for congestion and dental problem.    Eyes: Negative for discharge and itching.   Respiratory: Positive for shortness of breath. Negative for apnea and chest tightness.    Cardiovascular: Positive for chest pain. Negative for leg swelling.   Gastrointestinal: Negative for abdominal distention and abdominal pain.   Endocrine: Negative for cold intolerance and polydipsia.   Genitourinary: Negative for difficulty urinating and dysuria.   Musculoskeletal: Negative for arthralgias and back pain.   Skin: Negative for color change and pallor.   Allergic/Immunologic: Negative for environmental allergies and food allergies.   Neurological: Negative for dizziness and facial asymmetry.   Hematological: Negative for adenopathy. Does not bruise/bleed easily.   Psychiatric/Behavioral: Negative for agitation and behavioral problems.     Objective:     Vital Signs (Most Recent):  Temp: 99.1 °F (37.3 °C) (11/24/20 1439)  Pulse: 89 (11/24/20 1602)  Resp: 20 (11/24/20 1604)  BP: 112/64 (11/24/20 1602)  SpO2: 100 % (11/24/20 1439) Vital Signs (24h Range):  Temp:  [99.1 °F (37.3 °C)] 99.1 °F (37.3 °C)  Pulse:  [81-89] 89  Resp:  [18-24] 20  SpO2:  [100 %]  100 %  BP: (112-139)/(63-77) 112/64     Weight: 95.3 kg (210 lb)  Body mass index is 29.29 kg/m².    Physical Exam  Constitutional:       Appearance: Normal appearance.   HENT:      Head: Normocephalic and atraumatic.      Right Ear: Tympanic membrane normal.      Nose: Nose normal.      Mouth/Throat:      Mouth: Mucous membranes are dry.   Eyes:      Extraocular Movements: Extraocular movements intact.      Pupils: Pupils are equal, round, and reactive to light.   Neck:      Musculoskeletal: Normal range of motion and neck supple.   Cardiovascular:      Rate and Rhythm: Normal rate and regular rhythm.      Heart sounds: Murmur present.   Pulmonary:      Effort: Pulmonary effort is normal.      Breath sounds: Normal breath sounds.   Abdominal:      General: Abdomen is flat.      Palpations: Abdomen is soft.   Musculoskeletal: Normal range of motion.         General: No swelling, tenderness, deformity or signs of injury.   Skin:     General: Skin is warm and dry.   Neurological:      General: No focal deficit present.      Mental Status: He is alert. He is disoriented.   Psychiatric:         Mood and Affect: Mood normal.         Behavior: Behavior normal.           CRANIAL NERVES     CN III, IV, VI   Pupils are equal, round, and reactive to light.       Significant Labs:   BMP:   Recent Labs   Lab 11/24/20  1519   *      K <2.0*   CL 82*   CO2 39*   BUN 31*   CREATININE 2.5*   CALCIUM 8.9   MG 1.4*     CBC:   Recent Labs   Lab 11/24/20  1519   WBC 8.27   HGB 11.2*   HCT 31.5*        CMP:   Recent Labs   Lab 11/24/20  1519      K <2.0*   CL 82*   CO2 39*   *   BUN 31*   CREATININE 2.5*   CALCIUM 8.9   PROT 7.2   ALBUMIN 3.7   BILITOT 1.1*   ALKPHOS 72   AST 47*   ALT 28   ANIONGAP 18*   EGFRNONAA 25*     Cardiac Markers:   Recent Labs   Lab 11/24/20  1519   BNP 97     Troponin:   Recent Labs   Lab 11/24/20  1519   TROPONINI 0.063*       Significant Imaging:  reviewed.    Assessment/Plan:     * NSTEMI (non-ST elevated myocardial infarction)   Patient with intermittent chest pain with many risk factories in last 3 days,has  Troponin level of 0.063,EKG with non specific T wave changes,cardiology was called ,was planing taking to cath lab,but he has potassium level of less than 2 and magnesioum of 1.4 cardiology can not take patient to cath lab at this time,electrolytes is replaced IV and PO.,he has been also  already on heparin drip.NPO past MN.cardiology is consulted.      Hypokalemia  Replaced agressive IV and PO,will monitor closely in ICU with BPM Q 4 hours.      Hypomagnesemia  replaced per IV.      Type 2 diabetes mellitus with hyperglycemia  On SSI.      Aortic stenosis, moderate  Per Echo,has loud systolic murmur,on medical treatment.      BPH (benign prostatic hyperplasia)  Resumed Flomax.      Chronic combined systolic and diastolic congestive heart failure  With EF of 50%,will monitor for fluid overload.      CKD (chronic kidney disease), stage III  Baseline CRT is 1.6.will monitor,.      Acute renal failure superimposed on stage 3 chronic kidney disease  Baseline CRT is 1.6,will continue with IVF,folow up with labs.      Alcohol abuse  consulted need quit drinking,on DT precaution with scheduled valium,also vitamin with prn IV Ativan.drink usually 2 pint whisky every other day.      Anemia of chronic disease  Stable,will monitor.      Hyperlipidemia  Check lipid panel,on statin.      Essential hypertension  Stable at this time, stared on BB,prn Clonidine.      CAD (coronary artery disease)  On ACS medications,ASA,plavix,statin,BB,not on ACE or ARB duo to ARF,check lipid panel.        VTE Risk Mitigation (From admission, onward)         Ordered     heparin 25,000 units in dextrose 5% (100 units/ml) IV bolus from bag - ADDITIONAL PRN BOLUS - 60 units/kg (max bolus 4000 units)  As needed (PRN)     Question:  Heparin Infusion Adjustment (DO NOT MODIFY ANSWER)   Answer:  [email or web address]\epic\Images\Pharmacy\HeparinInfusions\heparin LOW INTENSITY nomogram for [facility] [id number].pdf    11/24/20 1657     heparin 25,000 units in dextrose 5% (100 units/ml) IV bolus from bag - ADDITIONAL PRN BOLUS - 30 units/kg (max bolus 4000 units)  As needed (PRN)     Question:  Heparin Infusion Adjustment (DO NOT MODIFY ANSWER)  Answer:  [email or web address]\[email or web address]\Images\Pharmacy\HeparinInfusions\heparin LOW INTENSITY nomogram for [facility] [id number].pdf    11/24/20 1657     heparin 25,000 units in dextrose 5% 250 mL (100 units/mL) infusion LOW INTENSITY nomogram - [facility]  Continuous     Question:  Heparin Infusion Adjustment (DO NOT MODIFY ANSWER)  Answer:  [email or web address]\epic\Images\Pharmacy\HeparinInfusions\heparin LOW INTENSITY nomogram for [facility] [id number].pdf    11/24/20 1652                   Raoul Morse MD  Department of Hospital Medicine   [facility]

## 2020-11-24 NOTE — ASSESSMENT & PLAN NOTE
consulted need quit drinking,on DT precaution with scheduled valium,also vitamin with prn IV Ativan.drink usually 2 pint whisky every other day.

## 2020-11-24 NOTE — SUBJECTIVE & OBJECTIVE
Past Medical History:   Diagnosis Date    Acute congestive heart failure 3/20/2020    Arthritis     Coronary artery disease     Diabetes mellitus     Hyperlipemia     Hypertension        Past Surgical History:   Procedure Laterality Date    EYE SURGERY      VASCULAR SURGERY         Review of patient's allergies indicates:  No Known Allergies    No current facility-administered medications on file prior to encounter.      Current Outpatient Medications on File Prior to Encounter   Medication Sig    amlodipine (NORVASC) 10 MG tablet Take 10 mg by mouth once daily.    aspirin (ECOTRIN) 81 MG EC tablet Take 1 tablet (81 mg total) by mouth once daily.    aspirin-calcium carbonate 81 mg-300 mg calcium(777 mg) Tab Take 81 mg by mouth.    atorvastatin (LIPITOR) 80 MG tablet Take 80 mg by mouth every evening.    atropine 1% (ISOPTO ATROPINE) 1 % Drop INT 1 GTT INTO OD QD FOR 1 WK    carvedilol (COREG) 12.5 MG tablet Take 1 tablet (12.5 mg total) by mouth 2 (two) times daily. (Patient taking differently: Take 25 mg by mouth 2 (two) times daily. )    colchicine (COLCRYS) 0.6 mg tablet Take 1 tablet (0.6 mg total) by mouth once daily. for 14 days    DUREZOL 0.05 % Drop ophthalmic solution INT 1 GTT INTO OD FID FOR 3 WEEKS    fenofibrate 160 MG Tab Take 160 mg by mouth.    folic acid (FOLVITE) 1 MG tablet Take 1 mg by mouth.    furosemide (LASIX) 40 MG tablet Take 1 tablet (40 mg total) by mouth 2 (two) times daily.    insulin syringe-needle U-100 1 mL 31 gauge x 5/16 Syrg USE TO INJECT INSULIN TWICE A DAY    lansoprazole (PREVACID) 30 MG capsule Take 30 mg by mouth once daily.    losartan-hydrochlorothiazide 100-25 mg (HYZAAR) 100-25 mg per tablet Take 1 tablet by mouth once daily.    magnesium oxide (MAG-OX) 400 mg (241.3 mg magnesium) tablet Take 800 mg by mouth.    metformin (GLUCOPHAGE) 1000 MG tablet Take 1,000 mg by mouth 2 (two) times daily with meals.    NOVOLOG MIX 70-30 U-100 INSULN 100  unit/mL (70-30) Soln INJ 55 UNI SC QAM AND 45 UNI QPM    pantoprazole (PROTONIX) 40 MG tablet Take 1 tablet (40 mg total) by mouth 2 (two) times daily.    rosuvastatin (CRESTOR) 40 MG Tab Take 10 mg by mouth once daily.    sildenafil (VIAGRA) 100 MG tablet Take 1 tablet (100 mg total) by mouth daily as needed for Erectile Dysfunction. *generic tabs*    tamsulosin (FLOMAX) 0.4 mg Cap Take 1 capsule (0.4 mg total) by mouth once daily.    terbinafine HCl (LAMISIL) 250 mg tablet      Family History     Problem Relation (Age of Onset)    Diabetes Mother, Father, Sister    Hypertension Mother, Father, Sister        Tobacco Use    Smoking status: Never Smoker    Smokeless tobacco: Never Used   Substance and Sexual Activity    Alcohol use: Yes     Alcohol/week: 4.0 standard drinks     Types: 4 Cans of beer per week    Drug use: No    Sexual activity: Not on file     Review of Systems   Constitutional: Positive for activity change and appetite change.   HENT: Negative for congestion and dental problem.    Eyes: Negative for discharge and itching.   Respiratory: Positive for shortness of breath. Negative for apnea and chest tightness.    Cardiovascular: Positive for chest pain. Negative for leg swelling.   Gastrointestinal: Negative for abdominal distention and abdominal pain.   Endocrine: Negative for cold intolerance and polydipsia.   Genitourinary: Negative for difficulty urinating and dysuria.   Musculoskeletal: Negative for arthralgias and back pain.   Skin: Negative for color change and pallor.   Allergic/Immunologic: Negative for environmental allergies and food allergies.   Neurological: Negative for dizziness and facial asymmetry.   Hematological: Negative for adenopathy. Does not bruise/bleed easily.   Psychiatric/Behavioral: Negative for agitation and behavioral problems.     Objective:     Vital Signs (Most Recent):  Temp: 99.1 °F (37.3 °C) (11/24/20 1439)  Pulse: 89 (11/24/20 1602)  Resp: 20 (11/24/20  1604)  BP: 112/64 (11/24/20 1602)  SpO2: 100 % (11/24/20 1439) Vital Signs (24h Range):  Temp:  [99.1 °F (37.3 °C)] 99.1 °F (37.3 °C)  Pulse:  [81-89] 89  Resp:  [18-24] 20  SpO2:  [100 %] 100 %  BP: (112-139)/(63-77) 112/64     Weight: 95.3 kg (210 lb)  Body mass index is 29.29 kg/m².    Physical Exam  Constitutional:       Appearance: Normal appearance.   HENT:      Head: Normocephalic and atraumatic.      Right Ear: Tympanic membrane normal.      Nose: Nose normal.      Mouth/Throat:      Mouth: Mucous membranes are dry.   Eyes:      Extraocular Movements: Extraocular movements intact.      Pupils: Pupils are equal, round, and reactive to light.   Neck:      Musculoskeletal: Normal range of motion and neck supple.   Cardiovascular:      Rate and Rhythm: Normal rate and regular rhythm.      Heart sounds: Murmur present.   Pulmonary:      Effort: Pulmonary effort is normal.      Breath sounds: Normal breath sounds.   Abdominal:      General: Abdomen is flat.      Palpations: Abdomen is soft.   Musculoskeletal: Normal range of motion.         General: No swelling, tenderness, deformity or signs of injury.   Skin:     General: Skin is warm and dry.   Neurological:      General: No focal deficit present.      Mental Status: He is alert. He is disoriented.   Psychiatric:         Mood and Affect: Mood normal.         Behavior: Behavior normal.           CRANIAL NERVES     CN III, IV, VI   Pupils are equal, round, and reactive to light.       Significant Labs:   BMP:   Recent Labs   Lab 11/24/20  1519   *      K <2.0*   CL 82*   CO2 39*   BUN 31*   CREATININE 2.5*   CALCIUM 8.9   MG 1.4*     CBC:   Recent Labs   Lab 11/24/20  1519   WBC 8.27   HGB 11.2*   HCT 31.5*        CMP:   Recent Labs   Lab 11/24/20  1519      K <2.0*   CL 82*   CO2 39*   *   BUN 31*   CREATININE 2.5*   CALCIUM 8.9   PROT 7.2   ALBUMIN 3.7   BILITOT 1.1*   ALKPHOS 72   AST 47*   ALT 28   ANIONGAP 18*   EGFRNONAA 25*      Cardiac Markers:   Recent Labs   Lab 11/24/20  1519   BNP 97     Troponin:   Recent Labs   Lab 11/24/20  1519   TROPONINI 0.063*       Significant Imaging: reviewed.

## 2020-11-25 LAB
ALBUMIN SERPL BCP-MCNC: 3.6 G/DL (ref 3.5–5.2)
ALP SERPL-CCNC: 72 U/L (ref 55–135)
ALT SERPL W/O P-5'-P-CCNC: 27 U/L (ref 10–44)
ANION GAP SERPL CALC-SCNC: 12 MMOL/L (ref 8–16)
ANION GAP SERPL CALC-SCNC: 14 MMOL/L (ref 8–16)
ANION GAP SERPL CALC-SCNC: 15 MMOL/L (ref 8–16)
ANION GAP SERPL CALC-SCNC: 15 MMOL/L (ref 8–16)
ANION GAP SERPL CALC-SCNC: 17 MMOL/L (ref 8–16)
ANION GAP SERPL CALC-SCNC: 18 MMOL/L (ref 8–16)
AORTIC ROOT ANNULUS: 2.85 CM
AORTIC VALVE CUSP SEPERATION: 1.44 CM
APTT BLDCRRT: 35.5 SEC (ref 21–32)
APTT BLDCRRT: 45 SEC (ref 21–32)
ASCENDING AORTA: 4.07 CM
AST SERPL-CCNC: 47 U/L (ref 10–40)
AV INDEX (PROSTH): 0.29
AV MEAN GRADIENT: 25 MMHG
AV PEAK GRADIENT: 42 MMHG
AV VALVE AREA: 1.61 CM2
AV VELOCITY RATIO: 0.29
BASOPHILS # BLD AUTO: 0.08 K/UL (ref 0–0.2)
BASOPHILS NFR BLD: 1.1 % (ref 0–1.9)
BILIRUB SERPL-MCNC: 1.3 MG/DL (ref 0.1–1)
BSA FOR ECHO PROCEDURE: 2.18 M2
BUN SERPL-MCNC: 22 MG/DL (ref 8–23)
BUN SERPL-MCNC: 22 MG/DL (ref 8–23)
BUN SERPL-MCNC: 23 MG/DL (ref 8–23)
BUN SERPL-MCNC: 25 MG/DL (ref 8–23)
BUN SERPL-MCNC: 26 MG/DL (ref 8–23)
BUN SERPL-MCNC: 28 MG/DL (ref 8–23)
CALCIUM SERPL-MCNC: 7.8 MG/DL (ref 8.7–10.5)
CALCIUM SERPL-MCNC: 8.2 MG/DL (ref 8.7–10.5)
CALCIUM SERPL-MCNC: 8.2 MG/DL (ref 8.7–10.5)
CALCIUM SERPL-MCNC: 8.5 MG/DL (ref 8.7–10.5)
CALCIUM SERPL-MCNC: 8.5 MG/DL (ref 8.7–10.5)
CALCIUM SERPL-MCNC: 8.6 MG/DL (ref 8.7–10.5)
CHLORIDE SERPL-SCNC: 84 MMOL/L (ref 95–110)
CHLORIDE SERPL-SCNC: 84 MMOL/L (ref 95–110)
CHLORIDE SERPL-SCNC: 85 MMOL/L (ref 95–110)
CO2 SERPL-SCNC: 33 MMOL/L (ref 23–29)
CO2 SERPL-SCNC: 37 MMOL/L (ref 23–29)
CO2 SERPL-SCNC: 38 MMOL/L (ref 23–29)
CO2 SERPL-SCNC: 38 MMOL/L (ref 23–29)
CO2 SERPL-SCNC: 39 MMOL/L (ref 23–29)
CO2 SERPL-SCNC: 39 MMOL/L (ref 23–29)
CREAT SERPL-MCNC: 1.7 MG/DL (ref 0.5–1.4)
CREAT SERPL-MCNC: 1.8 MG/DL (ref 0.5–1.4)
CREAT SERPL-MCNC: 1.9 MG/DL (ref 0.5–1.4)
CREAT SERPL-MCNC: 1.9 MG/DL (ref 0.5–1.4)
CREAT SERPL-MCNC: 2 MG/DL (ref 0.5–1.4)
CREAT SERPL-MCNC: 2.1 MG/DL (ref 0.5–1.4)
CV ECHO LV RWT: 0.54 CM
CV STRESS BASE HR: 89 BPM
DIASTOLIC BLOOD PRESSURE: 67 MMHG
DIFFERENTIAL METHOD: ABNORMAL
DOP CALC AO PEAK VEL: 3.23 M/S
DOP CALC AO VTI: 76.12 CM
DOP CALC LVOT AREA: 5.5 CM2
DOP CALC LVOT DIAMETER: 2.64 CM
DOP CALC LVOT PEAK VEL: 0.94 M/S
DOP CALC LVOT STROKE VOLUME: 122.83 CM3
DOP CALCLVOT PEAK VEL VTI: 22.45 CM
E WAVE DECELERATION TIME: 212.97 MSEC
E/A RATIO: 0.66
ECHO LV POSTERIOR WALL: 1.32 CM (ref 0.6–1.1)
EOSINOPHIL # BLD AUTO: 0.2 K/UL (ref 0–0.5)
EOSINOPHIL NFR BLD: 3 % (ref 0–8)
ERYTHROCYTE [DISTWIDTH] IN BLOOD BY AUTOMATED COUNT: 12.3 % (ref 11.5–14.5)
EST. GFR  (AFRICAN AMERICAN): 36 ML/MIN/1.73 M^2
EST. GFR  (AFRICAN AMERICAN): 38 ML/MIN/1.73 M^2
EST. GFR  (AFRICAN AMERICAN): 40 ML/MIN/1.73 M^2
EST. GFR  (AFRICAN AMERICAN): 40 ML/MIN/1.73 M^2
EST. GFR  (AFRICAN AMERICAN): 43 ML/MIN/1.73 M^2
EST. GFR  (AFRICAN AMERICAN): 46 ML/MIN/1.73 M^2
EST. GFR  (NON AFRICAN AMERICAN): 31 ML/MIN/1.73 M^2
EST. GFR  (NON AFRICAN AMERICAN): 33 ML/MIN/1.73 M^2
EST. GFR  (NON AFRICAN AMERICAN): 35 ML/MIN/1.73 M^2
EST. GFR  (NON AFRICAN AMERICAN): 35 ML/MIN/1.73 M^2
EST. GFR  (NON AFRICAN AMERICAN): 37 ML/MIN/1.73 M^2
EST. GFR  (NON AFRICAN AMERICAN): 40 ML/MIN/1.73 M^2
ESTIMATED AVG GLUCOSE: 275 MG/DL (ref 68–131)
FRACTIONAL SHORTENING: 35 % (ref 28–44)
GLUCOSE SERPL-MCNC: 164 MG/DL (ref 70–110)
GLUCOSE SERPL-MCNC: 175 MG/DL (ref 70–110)
GLUCOSE SERPL-MCNC: 181 MG/DL (ref 70–110)
GLUCOSE SERPL-MCNC: 252 MG/DL (ref 70–110)
GLUCOSE SERPL-MCNC: 310 MG/DL (ref 70–110)
GLUCOSE SERPL-MCNC: 336 MG/DL (ref 70–110)
HBA1C MFR BLD HPLC: 11.2 % (ref 4–5.6)
HCT VFR BLD AUTO: 33.8 % (ref 40–54)
HGB BLD-MCNC: 11.9 G/DL (ref 14–18)
IMM GRANULOCYTES # BLD AUTO: 0.02 K/UL (ref 0–0.04)
IMM GRANULOCYTES NFR BLD AUTO: 0.3 % (ref 0–0.5)
INTERVENTRICULAR SEPTUM: 1.26 CM (ref 0.6–1.1)
IVRT: 117.65 MSEC
LA MAJOR: 5.46 CM
LA MINOR: 6.03 CM
LA WIDTH: 4.62 CM
LEFT ATRIUM SIZE: 4.49 CM
LEFT ATRIUM VOLUME INDEX: 46.9 ML/M2
LEFT ATRIUM VOLUME: 101.05 CM3
LEFT INTERNAL DIMENSION IN SYSTOLE: 3.2 CM (ref 2.1–4)
LEFT VENTRICLE DIASTOLIC VOLUME INDEX: 52.95 ML/M2
LEFT VENTRICLE DIASTOLIC VOLUME: 114 ML
LEFT VENTRICLE MASS INDEX: 117 G/M2
LEFT VENTRICLE SYSTOLIC VOLUME INDEX: 19 ML/M2
LEFT VENTRICLE SYSTOLIC VOLUME: 40.93 ML
LEFT VENTRICULAR INTERNAL DIMENSION IN DIASTOLE: 4.92 CM (ref 3.5–6)
LEFT VENTRICULAR MASS: 252.51 G
LYMPHOCYTES # BLD AUTO: 1.7 K/UL (ref 1–4.8)
LYMPHOCYTES NFR BLD: 24.4 % (ref 18–48)
MCH RBC QN AUTO: 27.7 PG (ref 27–31)
MCHC RBC AUTO-ENTMCNC: 35.2 G/DL (ref 32–36)
MCV RBC AUTO: 79 FL (ref 82–98)
MONOCYTES # BLD AUTO: 0.9 K/UL (ref 0.3–1)
MONOCYTES NFR BLD: 13.1 % (ref 4–15)
MV PEAK A VEL: 1.07 M/S
MV PEAK E VEL: 0.71 M/S
NEUTROPHILS # BLD AUTO: 4.1 K/UL (ref 1.8–7.7)
NEUTROPHILS NFR BLD: 58.1 % (ref 38–73)
NRBC BLD-RTO: 0 /100 WBC
NUC STRESS DIASTOLIC VOLUME INDEX: 119
NUC STRESS EJECTION FRACTION: 48 %
NUC STRESS SYSTOLIC VOLUME INDEX: 62
OHS CV CPX 85 PERCENT MAX PREDICTED HEART RATE MALE: 128
OHS CV CPX MAX PREDICTED HEART RATE: 150
OHS CV CPX PATIENT IS FEMALE: 0
OHS CV CPX PATIENT IS MALE: 1
OHS CV CPX PEAK DIASTOLIC BLOOD PRESSURE: 65 MMHG
OHS CV CPX PEAK HEAR RATE: 92 BPM
OHS CV CPX PEAK RATE PRESSURE PRODUCT: NORMAL
OHS CV CPX PEAK SYSTOLIC BLOOD PRESSURE: 125 MMHG
OHS CV CPX PERCENT MAX PREDICTED HEART RATE ACHIEVED: 61
OHS CV CPX RATE PRESSURE PRODUCT PRESENTING: NORMAL
OSMOLALITY SERPL: 301 MOSM/KG (ref 280–300)
OSMOLALITY UR: 365 MOSM/KG (ref 50–1200)
PISA TR MAX VEL: 2.52 M/S
PLATELET # BLD AUTO: 197 K/UL (ref 150–350)
PMV BLD AUTO: 9.1 FL (ref 9.2–12.9)
POCT GLUCOSE: 177 MG/DL (ref 70–110)
POCT GLUCOSE: 210 MG/DL (ref 70–110)
POCT GLUCOSE: 340 MG/DL (ref 70–110)
POTASSIUM SERPL-SCNC: 2.3 MMOL/L (ref 3.5–5.1)
POTASSIUM SERPL-SCNC: 2.5 MMOL/L (ref 3.5–5.1)
POTASSIUM SERPL-SCNC: 2.6 MMOL/L (ref 3.5–5.1)
POTASSIUM SERPL-SCNC: 2.8 MMOL/L (ref 3.5–5.1)
POTASSIUM SERPL-SCNC: 2.9 MMOL/L (ref 3.5–5.1)
POTASSIUM SERPL-SCNC: 3 MMOL/L (ref 3.5–5.1)
POTASSIUM UR-SCNC: 20 MMOL/L (ref 15–95)
PROT SERPL-MCNC: 7.2 G/DL (ref 6–8.4)
PULM VEIN S/D RATIO: 1.59
PV PEAK D VEL: 0.27 M/S
PV PEAK S VEL: 0.43 M/S
PV PEAK VELOCITY: 1.08 CM/S
RA MAJOR: 5.1 CM
RA WIDTH: 3.42 CM
RBC # BLD AUTO: 4.3 M/UL (ref 4.6–6.2)
RIGHT VENTRICULAR END-DIASTOLIC DIMENSION: 3.74 CM
RV TISSUE DOPPLER FREE WALL SYSTOLIC VELOCITY 1 (APICAL 4 CHAMBER VIEW): 8.3 CM/S
SINUS: 3.92 CM
SODIUM SERPL-SCNC: 135 MMOL/L (ref 136–145)
SODIUM SERPL-SCNC: 135 MMOL/L (ref 136–145)
SODIUM SERPL-SCNC: 137 MMOL/L (ref 136–145)
SODIUM SERPL-SCNC: 138 MMOL/L (ref 136–145)
SODIUM SERPL-SCNC: 138 MMOL/L (ref 136–145)
SODIUM SERPL-SCNC: 140 MMOL/L (ref 136–145)
STJ: 3.18 CM
SYSTOLIC BLOOD PRESSURE: 130 MMHG
TR MAX PG: 25 MMHG
TRICUSPID ANNULAR PLANE SYSTOLIC EXCURSION: 2.42 CM
TROPONIN I SERPL DL<=0.01 NG/ML-MCNC: 0.07 NG/ML (ref 0–0.03)
WBC # BLD AUTO: 7.08 K/UL (ref 3.9–12.7)

## 2020-11-25 PROCEDURE — 85730 THROMBOPLASTIN TIME PARTIAL: CPT

## 2020-11-25 PROCEDURE — 99232 SBSQ HOSP IP/OBS MODERATE 35: CPT | Mod: 25,,, | Performed by: INTERNAL MEDICINE

## 2020-11-25 PROCEDURE — 80048 BASIC METABOLIC PNL TOTAL CA: CPT

## 2020-11-25 PROCEDURE — 82088 ASSAY OF ALDOSTERONE: CPT

## 2020-11-25 PROCEDURE — 25000003 PHARM REV CODE 250: Performed by: HOSPITALIST

## 2020-11-25 PROCEDURE — 80048 BASIC METABOLIC PNL TOTAL CA: CPT | Mod: 91

## 2020-11-25 PROCEDURE — 21400001 HC TELEMETRY ROOM

## 2020-11-25 PROCEDURE — 36415 COLL VENOUS BLD VENIPUNCTURE: CPT

## 2020-11-25 PROCEDURE — 63600175 PHARM REV CODE 636 W HCPCS: Performed by: HOSPITALIST

## 2020-11-25 PROCEDURE — 99232 PR SUBSEQUENT HOSPITAL CARE,LEVL II: ICD-10-PCS | Mod: 25,,, | Performed by: INTERNAL MEDICINE

## 2020-11-25 PROCEDURE — 84484 ASSAY OF TROPONIN QUANT: CPT

## 2020-11-25 PROCEDURE — 25000003 PHARM REV CODE 250: Performed by: NURSE PRACTITIONER

## 2020-11-25 PROCEDURE — 85025 COMPLETE CBC W/AUTO DIFF WBC: CPT

## 2020-11-25 PROCEDURE — 83935 ASSAY OF URINE OSMOLALITY: CPT

## 2020-11-25 PROCEDURE — 80053 COMPREHEN METABOLIC PANEL: CPT

## 2020-11-25 PROCEDURE — 84133 ASSAY OF URINE POTASSIUM: CPT

## 2020-11-25 PROCEDURE — 83930 ASSAY OF BLOOD OSMOLALITY: CPT

## 2020-11-25 PROCEDURE — 85730 THROMBOPLASTIN TIME PARTIAL: CPT | Mod: 91

## 2020-11-25 RX ORDER — POTASSIUM CHLORIDE 20 MEQ/1
60 TABLET, EXTENDED RELEASE ORAL ONCE
Status: COMPLETED | OUTPATIENT
Start: 2020-11-25 | End: 2020-11-25

## 2020-11-25 RX ORDER — LANOLIN ALCOHOL/MO/W.PET/CERES
800 CREAM (GRAM) TOPICAL ONCE
Status: COMPLETED | OUTPATIENT
Start: 2020-11-25 | End: 2020-11-25

## 2020-11-25 RX ORDER — HEPARIN SODIUM 5000 [USP'U]/ML
5000 INJECTION, SOLUTION INTRAVENOUS; SUBCUTANEOUS EVERY 8 HOURS
Status: DISCONTINUED | OUTPATIENT
Start: 2020-11-25 | End: 2020-11-27 | Stop reason: HOSPADM

## 2020-11-25 RX ORDER — AMLODIPINE BESYLATE 5 MG/1
10 TABLET ORAL DAILY
Status: DISCONTINUED | OUTPATIENT
Start: 2020-11-25 | End: 2020-11-27 | Stop reason: HOSPADM

## 2020-11-25 RX ORDER — SODIUM CHLORIDE 450 MG/100ML
INJECTION, SOLUTION INTRAVENOUS CONTINUOUS
Status: DISCONTINUED | OUTPATIENT
Start: 2020-11-25 | End: 2020-11-25

## 2020-11-25 RX ORDER — REGADENOSON 0.08 MG/ML
0.4 INJECTION, SOLUTION INTRAVENOUS ONCE
Status: COMPLETED | OUTPATIENT
Start: 2020-11-25 | End: 2020-11-25

## 2020-11-25 RX ORDER — MUPIROCIN 20 MG/G
OINTMENT TOPICAL 2 TIMES DAILY
Status: DISCONTINUED | OUTPATIENT
Start: 2020-11-25 | End: 2020-11-27 | Stop reason: HOSPADM

## 2020-11-25 RX ORDER — POTASSIUM CHLORIDE 7.45 MG/ML
20 INJECTION INTRAVENOUS ONCE
Status: COMPLETED | OUTPATIENT
Start: 2020-11-25 | End: 2020-11-25

## 2020-11-25 RX ORDER — SODIUM CHLORIDE AND POTASSIUM CHLORIDE 150; 450 MG/100ML; MG/100ML
INJECTION, SOLUTION INTRAVENOUS CONTINUOUS
Status: DISCONTINUED | OUTPATIENT
Start: 2020-11-25 | End: 2020-11-26

## 2020-11-25 RX ADMIN — THERA TABS 1 TABLET: TAB at 08:11

## 2020-11-25 RX ADMIN — POTASSIUM CHLORIDE AND SODIUM CHLORIDE: 450; 150 INJECTION, SOLUTION INTRAVENOUS at 08:11

## 2020-11-25 RX ADMIN — DIAZEPAM 10 MG: 5 TABLET ORAL at 05:11

## 2020-11-25 RX ADMIN — POTASSIUM BICARBONATE 50 MEQ: 978 TABLET, EFFERVESCENT ORAL at 06:11

## 2020-11-25 RX ADMIN — FAMOTIDINE 20 MG: 10 INJECTION INTRAVENOUS at 08:11

## 2020-11-25 RX ADMIN — MUPIROCIN: 20 OINTMENT TOPICAL at 09:11

## 2020-11-25 RX ADMIN — MUPIROCIN: 20 OINTMENT TOPICAL at 08:11

## 2020-11-25 RX ADMIN — ATORVASTATIN CALCIUM 40 MG: 40 TABLET, FILM COATED ORAL at 08:11

## 2020-11-25 RX ADMIN — DIAZEPAM 10 MG: 5 TABLET ORAL at 09:11

## 2020-11-25 RX ADMIN — INSULIN ASPART 2 UNITS: 100 INJECTION, SOLUTION INTRAVENOUS; SUBCUTANEOUS at 08:11

## 2020-11-25 RX ADMIN — MAGNESIUM OXIDE TAB 400 MG (241.3 MG ELEMENTAL MG) 800 MG: 400 (241.3 MG) TAB at 01:11

## 2020-11-25 RX ADMIN — HEPARIN SODIUM 5000 UNITS: 5000 INJECTION INTRAVENOUS; SUBCUTANEOUS at 03:11

## 2020-11-25 RX ADMIN — TAMSULOSIN HYDROCHLORIDE 0.4 MG: 0.4 CAPSULE ORAL at 08:11

## 2020-11-25 RX ADMIN — POTASSIUM CHLORIDE 60 MEQ: 1500 TABLET, EXTENDED RELEASE ORAL at 11:11

## 2020-11-25 RX ADMIN — HEPARIN SODIUM 5000 UNITS: 5000 INJECTION INTRAVENOUS; SUBCUTANEOUS at 09:11

## 2020-11-25 RX ADMIN — METOPROLOL SUCCINATE 25 MG: 25 TABLET, EXTENDED RELEASE ORAL at 08:11

## 2020-11-25 RX ADMIN — DIAZEPAM 10 MG: 5 TABLET ORAL at 03:11

## 2020-11-25 RX ADMIN — POTASSIUM CHLORIDE 60 MEQ: 1500 TABLET, EXTENDED RELEASE ORAL at 07:11

## 2020-11-25 RX ADMIN — Medication 100 MG: at 08:11

## 2020-11-25 RX ADMIN — SODIUM CHLORIDE: 0.45 INJECTION, SOLUTION INTRAVENOUS at 01:11

## 2020-11-25 RX ADMIN — POTASSIUM CHLORIDE 20 MEQ: 7.46 INJECTION, SOLUTION INTRAVENOUS at 01:11

## 2020-11-25 RX ADMIN — AMLODIPINE BESYLATE 10 MG: 5 TABLET ORAL at 08:11

## 2020-11-25 RX ADMIN — POTASSIUM CHLORIDE 20 MEQ: 7.46 INJECTION, SOLUTION INTRAVENOUS at 07:11

## 2020-11-25 RX ADMIN — POTASSIUM CHLORIDE 60 MEQ: 1500 TABLET, EXTENDED RELEASE ORAL at 01:11

## 2020-11-25 RX ADMIN — FOLIC ACID 1 MG: 1 TABLET ORAL at 08:11

## 2020-11-25 RX ADMIN — ASPIRIN 325 MG: 325 TABLET, COATED ORAL at 08:11

## 2020-11-25 NOTE — NURSING TRANSFER
Nursing Transfer Note      11/25/2020     Transfer To: 320    Transfer via wheelchair    Transfer with cardiac monitoring    Transported by JOYCE Ma    Medicines sent: novolog, mupirocin    Chart send with patient: Yes    Notified: pt notified family    Patient reassessed at: 1000 11/25/2020

## 2020-11-25 NOTE — CONSULTS
Reason for consultation: acute renal failure, electrolyte imbalance    HPI:  71 yo AA man with h/o HTN, DM type 2, moderate aortic stenosis, alcoholism, CAD presented to the hospital with chest pain and was admitted for NSTEMI.  He is also found with elevated serum creatinine fro baseline, and low serum levels of potassium and magnesium.  Nephrology is consulted for evaluation of acute renal failure and electrolyte imbalance.  He reports drinking at least 6 beers a day.  At this time, he denies any problems with SOB, fever, chills, N?V.  Chest pain has been resolved.    PMH:  As above    Scheduled Meds:   aspirin  325 mg Oral Daily    atorvastatin  40 mg Oral QHS    diazePAM  10 mg Oral Q8H    famotidine (PF)  20 mg Intravenous Daily    folic acid  1 mg Oral Daily    metoprolol succinate  25 mg Oral Daily    multivitamin  1 tablet Oral Daily    tamsulosin  0.4 mg Oral Daily    thiamine  100 mg Oral Daily       Review of patient's allergies indicates:  No Known Allergies    Family history:  Non contributory    Social history:  No tobacco, (+) excessive alcohol use    Vital Signs Range (Last 24H):  Temp:  [98.4 °F (36.9 °C)-99.1 °F (37.3 °C)]   Pulse:  [69-96]   Resp:  [12-26]   BP: (112-174)/()   SpO2:  [100 %]     I & O (Last 24H):    Intake/Output Summary (Last 24 hours) at 11/25/2020 0806  Last data filed at 11/25/2020 0600  Gross per 24 hour   Intake 2309.06 ml   Output 500 ml   Net 1809.06 ml           Physical Exam:  General appearance: well developed, well nourished, no distress  Lungs:  clear to auscultation bilaterally and normal respiratory effort  Heart: regular rate and rhythm  Abdomen: soft, non-tender non-distented; bowel sounds normal; no masses,  no organomegaly  Extremities: no cyanosis or edema, or clubbing    Laboratory:  CBC:   Recent Labs   Lab 11/25/20  0433   WBC 7.08   RBC 4.30*   HGB 11.9*   HCT 33.8*      MCV 79*   MCH 27.7   MCHC 35.2     CMP:   Recent Labs   Lab  11/25/20  0433   *   CALCIUM 8.5*   ALBUMIN 3.6   PROT 7.2      K 2.5*   CO2 39*   CL 84*   BUN 26*   CREATININE 1.9*   ALKPHOS 72   ALT 27   AST 47*   BILITOT 1.3*     Cardiac markers:   Recent Labs   Lab 11/25/20  0655   TROPONINI 0.070*     Recent Labs   Lab 11/24/20  1834   COLORU Yellow   SPECGRAV 1.010   PHUR 6.0   PROTEINUA 2+*   BACTERIA Rare   NITRITE Negative   LEUKOCYTESUR Negative   UROBILINOGEN Negative   HYALINECASTS 3*       Impression:    ELIZABETH on CKD stage 3 - prerenal azeotemia, may also relate to cardiac decompensation  Electrolyte imbalance - hypomagnesemia/hypokalemia related to excessive alcohol intake  NSTEMI  HTN  DM type 2  Proteinuria - most likely DM nephropathy  Alcoholism    Discussion/Recommendations:    Continue IVF  Replace Mg and K  Watch renal function and electrolytes  We'll follow    Thank you for the courtesy of the consultation      Ilene Tirado  11/25/2020

## 2020-11-25 NOTE — SUBJECTIVE & OBJECTIVE
Interval History:  Chest pain-free    Review of Systems   Cardiovascular: Positive for leg swelling. Negative for chest pain, dyspnea on exertion, irregular heartbeat, orthopnea, palpitations, paroxysmal nocturnal dyspnea and syncope.   Respiratory: Negative for shortness of breath, sputum production and wheezing.      Objective:     Vital Signs (Most Recent):  Temp: 98.6 °F (37 °C) (11/25/20 0715)  Pulse: 69 (11/25/20 0900)  Resp: 16 (11/25/20 0900)  BP: (!) 180/95 (11/25/20 0900)  SpO2: 100 % (11/25/20 0900) Vital Signs (24h Range):  Temp:  [98.4 °F (36.9 °C)-99.1 °F (37.3 °C)] 98.6 °F (37 °C)  Pulse:  [69-96] 69  Resp:  [11-26] 16  SpO2:  [100 %] 100 %  BP: (112-180)/() 180/95     Weight: 95.3 kg (210 lb)  Body mass index is 29.29 kg/m².     SpO2: 100 %  O2 Device (Oxygen Therapy): room air      Intake/Output Summary (Last 24 hours) at 11/25/2020 0908  Last data filed at 11/25/2020 0901  Gross per 24 hour   Intake 2833.53 ml   Output 1100 ml   Net 1733.53 ml       Lines/Drains/Airways     Peripheral Intravenous Line                 Peripheral IV - Single Lumen 11/24/20 1443 18 G Anterior;Left Upper Arm less than 1 day         Peripheral IV - Single Lumen 11/24/20 1834 20 G Left Hand less than 1 day         Peripheral IV - Single Lumen 11/24/20 1834 20 G Right Forearm less than 1 day                Physical Exam   Constitutional: He is oriented to person, place, and time. No distress.   Neck: No JVD present. Carotid bruit is not present.   Cardiovascular: Normal rate, regular rhythm and intact distal pulses.   Murmur heard.   Crescendo systolic murmur is present with a grade of 3/6.  Pulmonary/Chest: Effort normal and breath sounds normal.   Abdominal: Soft. Bowel sounds are normal. There is no abdominal tenderness.   Musculoskeletal:      Right lower leg: Edema present.      Left lower leg: Edema present.   Neurological: He is alert and oriented to person, place, and time.   Skin: He is not diaphoretic.    Psychiatric: He has a normal mood and affect. His speech is normal and behavior is normal.   Vitals reviewed.      Significant Labs:   BMP:   Recent Labs   Lab 11/24/20  1519  11/24/20 2001 11/24/20 2157 11/25/20  0003 11/25/20  0433 11/25/20  0655   *   < >  --   --  175* 164* 181*      < >  --   --  140 138 138   K <2.0*   < > 2.4* 2.3* 2.6* 2.5* 2.3*   CL 82*   < >  --   --  84* 84* 85*   CO2 39*   < >  --   --  38* 39* 39*   BUN 31*   < >  --   --  28* 26* 25*   CREATININE 2.5*   < >  --   --  2.0* 1.9* 1.8*   CALCIUM 8.9   < >  --   --  8.6* 8.5* 8.2*   MG 1.4*  --  2.1 1.8  --   --   --     < > = values in this interval not displayed.   , CBC   Recent Labs   Lab 11/24/20  1519 11/25/20  0433   WBC 8.27 7.08   HGB 11.2* 11.9*   HCT 31.5* 33.8*    197   , Lipid Panel   Recent Labs   Lab 11/24/20  1519   CHOL 98*   HDL 36*   LDLCALC 15.2*   TRIG 234*   CHOLHDL 36.7    and Troponin   Recent Labs   Lab 11/24/20 2001 11/24/20 2157 11/25/20  0655   TROPONINI 0.060* 0.067* 0.070*       Significant Imaging: Echocardiogram:   Transthoracic echo (TTE) complete (Cupid Only):   Results for orders placed or performed during the hospital encounter of 11/24/20   Echo Color Flow Doppler? Yes   Result Value Ref Range    BSA 2.18 m2    LA WIDTH 4.62 cm    AORTIC VALVE CUSP SEPERATION 1.44 cm    PV PEAK VELOCITY 1.08 cm/s    LVIDd 4.92 3.5 - 6.0 cm    IVS 1.26 (A) 0.6 - 1.1 cm    Posterior Wall 1.32 (A) 0.6 - 1.1 cm    Ao root annulus 2.85 cm    LVIDs 3.20 2.1 - 4.0 cm    FS 35 28 - 44 %    LA volume 101.05 cm3    Sinus 3.92 cm    STJ 3.18 cm    Ascending aorta 4.07 cm    LV mass 252.51 g    LA size 4.49 cm    RVDD 3.74 cm    TAPSE 2.42 cm    RV S' 8.30 cm/s    Left Ventricle Relative Wall Thickness 0.54 cm    AV mean gradient 25 mmHg    AV valve area 1.61 cm2    AV Velocity Ratio 0.29     AV index (prosthetic) 0.29     E/A ratio 0.66     E wave decelartion time 212.97 msec    IVRT 117.65 msec    Pulm  vein S/D ratio 1.59     LVOT diameter 2.64 cm    LVOT area 5.5 cm2    LVOT peak david 0.94 m/s    LVOT peak VTI 22.45 cm    Ao peak david 3.23 m/s    Ao VTI 76.12 cm    LVOT stroke volume 122.83 cm3    AV peak gradient 42 mmHg    MV Peak E David 0.71 m/s    TR Max David 2.52 m/s    MV Peak A David 1.07 m/s    PV Peak S David 0.43 m/s    PV Peak D David 0.27 m/s    LV Systolic Volume 40.93 mL    LV Systolic Volume Index 19.0 mL/m2    LV Diastolic Volume 114.00 mL    LV Diastolic Volume Index 52.95 mL/m2    LA Volume Index 46.9 mL/m2    LV Mass Index 117 g/m2    RA Major Axis 5.10 cm    Left Atrium Minor Axis 6.03 cm    Left Atrium Major Axis 5.46 cm    Triscuspid Valve Regurgitation Peak Gradient 25 mmHg    RA Width 3.42 cm    Narrative    · The left ventricle is normal in size with normal systolic function. The   estimated ejection fraction is 55%.  · There is left ventricular concentric hypertrophy.  · Moderate left atrial enlargement.  · Normal left ventricular diastolic function.  · Normal right ventricular size with normal right ventricular systolic   function.  · Mild-to-moderate aortic valve stenosis.  · Aortic valve area is 1.61 cm2; peak velocity is 3.23 m/s; mean gradient   is 25 mmHg.  · Moderate aortic regurgitation.  · Mild mitral regurgitation.  · No pulmonary hypertension

## 2020-11-25 NOTE — HOSPITAL COURSE
69-year-old male with a PMHx of CAD, CHF with EF of 50%,alcohol abuse,CKD 3,, DM, and HTN presents to the ED, per EMS, complaining of intermittent chest pain x 3 days. He describes his pain as pressure. CP radiates to his neck. CP exacerbating with deep respiration. Reports associated SOB with CP. Reports emesis with CP , pt states he felt light-headed while shopping prior to onset of CP around 2 pm. States he was standing at a bus stop when his pain began. States CP lasted about 1 hour.  States EMS gave ASA en route. Pt states he does not take NTG or ASA at home.in ER has Troponin level of 0.063,EKG wit non specific T wave changes,cardiology was called ,was planing taking to cath lab,but he had  potassium level of less than 2 and magnesioum of 1.4 cardiology could not take patient to cath lab ,electrolytes is replaced IV and PO.he has been also started on IVF for ARF on CKD 3,he was hemodynamically stable and he was on RA,he was started  already on heparin drip to ICU for close monitoring..his last alcohol consumption was day before admission,,drink 2 pint of whisky every orgher day.  Hypokalemia and Hypomagnesemia is gradually improved,continued replace and monitor closely.  CRT with IVF  is improved,was back to baseline, nephrology was following,  Troponin  level remains flat,chest pain is resolved,.SNT show no acute ischemia,  Patient was on DT precaution with schedulled valium and vitamins,gradully wean down.  Did well with PT,OT and HH at MT time is arranged.  Patient was consulted multiple times quit alcohol consumption,patient was discharged home with HH and  DME and follow up with PCP in next few days.

## 2020-11-25 NOTE — ASSESSMENT & PLAN NOTE
Baseline CRT is 1.6,will continue with IVF,folow up with labs,  CRT is improving,consulted nephrology a [facility].

## 2020-11-25 NOTE — ASSESSMENT & PLAN NOTE
Patient with intermittent chest pain with many risk factories in last 3 days,has  Troponin level of 0.063,EKG with non specific T wave changes,cardiology was called ,was planing taking to cath lab,but he has potassium level of less than 2 and magnesioum of 1.4 cardiology can not take patient to cath lab at this time,electrolytes is replaced IV and PO.,he has been also  already on heparin drip.NPO past MN.cardiology is consulted.  Troponin  level remains flat,chest pain is improved.echo result is pending.

## 2020-11-25 NOTE — PROGRESS NOTES
[facility] Hospital Medicine  Progress Note    Patient Name: Chato Bowie  MRN: [id number]  Patient Class: IP- Inpatient   Admission Date: 11/24/2020  Length of Stay: 1 days  Attending Physician: Raoul Morse MD  Primary Care Provider: [facility]        Subjective:     Principal Problem:NSTEMI (non-ST elevated myocardial infarction)        HPI:  69-year-old male with a PMHx of CAD, CHF with EF of 50%,alcohol abuse,CKD 3,, DM, and HTN presents to the ED, per EMS, complaining of intermittent chest pain x 3 days. He describes his pain as pressure. CP radiates to his neck. CP exacerbating with deep respiration. Reports associated SOB with CP. Reports emesis with CP yesterday. Today, pt states he felt light-headed while shopping prior to onset of CP around 2 pm. States he was standing at a bus stop when his pain began. States CP lasted about 1 hour. Reports of chest pain at this time. States EMS gave ASA en route. Pt states he does not take NTG or ASA at home.in ER has Troponin level of 0.063,EKG wit non specific T wave changes,cardiology was called ,was planing taking to cath lab,but he has potassium level of less than 2 and magnesioum of 1.4 cardiology can not take patient to cath lab at this time,electrolytes is replaced IV and PO.he has been also started on IVF for ARF on CKD 3,he is hemodynamically stable and he is on RA,he is already on heparin drip.his last alcohol consumption was yesterday,drink 2 pint of whisky.    Overview/Hospital Course:  69-year-old male with a PMHx of CAD, CHF with EF of 50%,alcohol abuse,CKD 3,, DM, and HTN presents to the ED, per EMS, complaining of intermittent chest pain x 3 days. He describes his pain as pressure. CP radiates to his neck. CP exacerbating with deep respiration. Reports associated SOB with CP. Reports emesis with CP , pt states he felt light-headed while shopping prior to onset of CP around 2 pm. States he was standing at a bus stop  when his pain began. States CP lasted about 1 hour.  States EMS gave ASA en route. Pt states he does not take NTG or ASA at home.in ER has Troponin level of 0.063,EKG wit non specific T wave changes,cardiology was called ,was planing taking to cath lab,but he has potassium level of less than 2 and magnesioum of 1.4 cardiology could not take patient to cath lab ,electrolytes is replaced IV and PO.he has been also started on IVF for ARF on CKD 3,he is hemodynamically stable and he is on RA,he is already on heparin drip.his last alcohol consumption was day before admission,,drink 2 pint of whisky every orgher day.  Hypokalemia and Hypomagnesemia is gradually improving,will continue replace and monitor closely.  CRT is improving,consulted nephrology jake arguelles.  Troponin  level remains flat,chest pain is improved.    Past Medical History:   Diagnosis Date    Acute congestive heart failure 3/20/2020    Alcohol abuse     Arthritis     Coronary artery disease     Diabetes mellitus     Hyperlipemia     Hypertension     Rhabdomyolysis        Past Surgical History:   Procedure Laterality Date    EYE SURGERY      VASCULAR SURGERY         Review of patient's allergies indicates:  No Known Allergies    No current facility-administered medications on file prior to encounter.      Current Outpatient Medications on File Prior to Encounter   Medication Sig    amlodipine (NORVASC) 10 MG tablet Take 10 mg by mouth once daily.    aspirin (ECOTRIN) 81 MG EC tablet Take 1 tablet (81 mg total) by mouth once daily.    aspirin-calcium carbonate 81 mg-300 mg calcium(777 mg) Tab Take 81 mg by mouth.    atorvastatin (LIPITOR) 80 MG tablet Take 80 mg by mouth every evening.    atropine 1% (ISOPTO ATROPINE) 1 % Drop INT 1 GTT INTO OD QD FOR 1 WK    carvedilol (COREG) 12.5 MG tablet Take 1 tablet (12.5 mg total) by mouth 2 (two) times daily. (Patient taking differently: Take 25 mg by mouth 2 (two) times daily. )    colchicine  (COLCRYS) 0.6 mg tablet Take 1 tablet (0.6 mg total) by mouth once daily. for 14 days    DUREZOL 0.05 % Drop ophthalmic solution INT 1 GTT INTO OD FID FOR 3 WEEKS    fenofibrate 160 MG Tab Take 160 mg by mouth.    folic acid (FOLVITE) 1 MG tablet Take 1 mg by mouth.    furosemide (LASIX) 40 MG tablet Take 1 tablet (40 mg total) by mouth 2 (two) times daily.    insulin syringe-needle U-100 1 mL 31 gauge x 5/16 Syrg USE TO INJECT INSULIN TWICE A DAY    lansoprazole (PREVACID) 30 MG capsule Take 30 mg by mouth once daily.    losartan-hydrochlorothiazide 100-25 mg (HYZAAR) 100-25 mg per tablet Take 1 tablet by mouth once daily.    magnesium oxide (MAG-OX) 400 mg (241.3 mg magnesium) tablet Take 800 mg by mouth.    metformin (GLUCOPHAGE) 1000 MG tablet Take 1,000 mg by mouth 2 (two) times daily with meals.    NOVOLOG MIX 70-30 U-100 INSULN 100 unit/mL (70-30) Soln INJ 55 UNI SC QAM AND 45 UNI QPM    pantoprazole (PROTONIX) 40 MG tablet Take 1 tablet (40 mg total) by mouth 2 (two) times daily.    rosuvastatin (CRESTOR) 40 MG Tab Take 10 mg by mouth once daily.    sildenafil (VIAGRA) 100 MG tablet Take 1 tablet (100 mg total) by mouth daily as needed for Erectile Dysfunction. *generic tabs*    tamsulosin (FLOMAX) 0.4 mg Cap Take 1 capsule (0.4 mg total) by mouth once daily.    terbinafine HCl (LAMISIL) 250 mg tablet      Family History     Problem Relation (Age of Onset)    Diabetes Mother, Father, Sister    Hypertension Mother, Father, Sister        Tobacco Use    Smoking status: Never Smoker    Smokeless tobacco: Never Used   Substance and Sexual Activity    Alcohol use: Yes     Alcohol/week: 4.0 standard drinks     Types: 4 Cans of beer per week    Drug use: No    Sexual activity: Not on file     Review of Systems   Constitutional: Positive for activity change and appetite change.   HENT: Negative for congestion and dental problem.    Eyes: Negative for discharge and itching.   Respiratory: Negative  for apnea, chest tightness and shortness of breath.    Cardiovascular: Positive for chest pain. Negative for leg swelling.   Gastrointestinal: Negative for abdominal distention and abdominal pain.   Endocrine: Negative for cold intolerance and polydipsia.   Genitourinary: Negative for difficulty urinating and dysuria.   Musculoskeletal: Negative for arthralgias and back pain.   Skin: Negative for color change and pallor.   Allergic/Immunologic: Negative for environmental allergies and food allergies.   Neurological: Negative for dizziness and facial asymmetry.   Hematological: Negative for adenopathy. Does not bruise/bleed easily.   Psychiatric/Behavioral: Negative for agitation and behavioral problems.     Objective:     Vital Signs (Most Recent):  Temp: 98.6 °F (37 °C) (11/25/20 0715)  Pulse: 73 (11/25/20 0800)  Resp: (!) 21 (11/25/20 0800)  BP: (!) 162/108 (11/25/20 0800)  SpO2: 100 % (11/25/20 0800) Vital Signs (24h Range):  Temp:  [98.4 °F (36.9 °C)-99.1 °F (37.3 °C)] 98.6 °F (37 °C)  Pulse:  [69-96] 73  Resp:  [11-26] 21  SpO2:  [100 %] 100 %  BP: (112-174)/() 162/108     Weight: 95.3 kg (210 lb)  Body mass index is 29.29 kg/m².    Physical Exam  Constitutional:       Appearance: Normal appearance.   HENT:      Head: Normocephalic and atraumatic.      Right Ear: Tympanic membrane normal.      Nose: Nose normal.      Mouth/Throat:      Mouth: Mucous membranes are dry.   Eyes:      Extraocular Movements: Extraocular movements intact.      Pupils: Pupils are equal, round, and reactive to light.   Neck:      Musculoskeletal: Normal range of motion and neck supple.   Cardiovascular:      Rate and Rhythm: Normal rate and regular rhythm.      Heart sounds: Murmur present.   Pulmonary:      Effort: Pulmonary effort is normal.      Breath sounds: Normal breath sounds.   Abdominal:      General: Abdomen is flat.      Palpations: Abdomen is soft.   Musculoskeletal: Normal range of motion.         General: No  swelling, tenderness, deformity or signs of injury.   Skin:     General: Skin is warm and dry.   Neurological:      General: No focal deficit present.      Mental Status: He is alert. He is disoriented.   Psychiatric:         Mood and Affect: Mood normal.         Behavior: Behavior normal.           CRANIAL NERVES     CN III, IV, VI   Pupils are equal, round, and reactive to light.       Significant Labs:   BMP:   Recent Labs   Lab 11/24/20 2157 11/25/20  0655   GLU  --    < > 181*   NA  --    < > 138   K 2.3*   < > 2.3*   CL  --    < > 85*   CO2  --    < > 39*   BUN  --    < > 25*   CREATININE  --    < > 1.8*   CALCIUM  --    < > 8.2*   MG 1.8  --   --     < > = values in this interval not displayed.     CBC:   Recent Labs   Lab 11/24/20 1519 11/25/20 0433   WBC 8.27 7.08   HGB 11.2* 11.9*   HCT 31.5* 33.8*    197     CMP:   Recent Labs   Lab 11/24/20 1519 11/25/20  0003 11/25/20  0433 11/25/20  0655      < > 140 138 138   K <2.0*   < > 2.6* 2.5* 2.3*   CL 82*   < > 84* 84* 85*   CO2 39*   < > 38* 39* 39*   *   < > 175* 164* 181*   BUN 31*   < > 28* 26* 25*   CREATININE 2.5*   < > 2.0* 1.9* 1.8*   CALCIUM 8.9   < > 8.6* 8.5* 8.2*   PROT 7.2  --   --  7.2  --    ALBUMIN 3.7  --   --  3.6  --    BILITOT 1.1*  --   --  1.3*  --    ALKPHOS 72  --   --  72  --    AST 47*  --   --  47*  --    ALT 28  --   --  27  --    ANIONGAP 18*   < > 18* 15 14   EGFRNONAA 25*   < > 33* 35* 37*    < > = values in this interval not displayed.     Cardiac Markers:   Recent Labs   Lab 11/24/20 1519   BNP 97     Troponin:   Recent Labs   Lab 11/24/20 2001 11/24/20 2157 11/25/20  0655   TROPONINI 0.060* 0.067* 0.070*       Significant Imaging: reviewed.      Assessment/Plan:      * NSTEMI (non-ST elevated myocardial infarction)   Patient with intermittent chest pain with many risk factories in last 3 days,has  Troponin level of 0.063,EKG with non specific T wave changes,cardiology was called ,was planing taking  to cath lab,but he has potassium level of less than 2 and magnesioum of 1.4 cardiology can not take patient to cath lab at this time,electrolytes is replaced IV and PO.,he has been also  already on heparin drip.NPO past MN.cardiology is consulted.  Troponin  level remains flat,chest pain is improved.echo result is pending.      Hypokalemia  Replaced agressive IV and PO,will monitor closely in ICU with BPM Q 4 hours.Hypokalemia and Hypomagnesemia is gradually improving,will continue replace and monitor closely.        Hypomagnesemia  replaced per IV.      Type 2 diabetes mellitus with hyperglycemia  On SSI.      Aortic stenosis, moderate  Per Echo,has loud systolic murmur,on medical treatment.      BPH (benign prostatic hyperplasia)  Resumed Flomax.      Chronic combined systolic and diastolic congestive heart failure  With EF of 50%,will monitor for fluid overload.      CKD (chronic kidney disease), stage III  Baseline CRT is 1.6.will monitor,.      Acute renal failure superimposed on stage 3 chronic kidney disease  Baseline CRT is 1.6,will continue with IVF,folow up with labs,  CRT is improving,consulted nephrology jake arguelles.    Alcohol abuse  consulted need quit drinking,on DT precaution with scheduled valium,also vitamin with prn IV Ativan.drink usually 2 pint whisky every other day.      Anemia of chronic disease  Stable,will monitor.      Hyperlipidemia  Check lipid panel,on statin.      Essential hypertension  Stable at this time, stared on BB,prn Clonidine.adede Norvasc.      CAD (coronary artery disease)  On ACS medications,ASA,plavix,statin,BB,not on ACE or ARB duo to ARF,check lipid panel.        VTE Risk Mitigation (From admission, onward)         Ordered     heparin 25,000 units in dextrose 5% (100 units/ml) IV bolus from bag - ADDITIONAL PRN BOLUS - 60 units/kg (max bolus 4000 units)  As needed (PRN)     Question:  Heparin Infusion Adjustment (DO NOT MODIFY ANSWER)  Answer:   \\[facility]\epic\Images\Pharmacy\HeparinInfusions\heparin LOW INTENSITY nomogram for [facility] [id number].pdf    11/24/20 1657     heparin 25,000 units in dextrose 5% (100 units/ml) IV bolus from bag - ADDITIONAL PRN BOLUS - 30 units/kg (max bolus 4000 units)  As needed (PRN)     Question:  Heparin Infusion Adjustment (DO NOT MODIFY ANSWER)  Answer:  \\[email or web address]\epic\Images\Pharmacy\HeparinInfusions\heparin LOW INTENSITY nomogram for [facility] [id number].pdf    11/24/20 1657     heparin 25,000 units in dextrose 5% 250 mL (100 units/mL) infusion LOW INTENSITY nomogram - [facility]  Continuous     Question:  Heparin Infusion Adjustment (DO NOT MODIFY ANSWER)  Answer:  \\[facility]\epic\Images\Pharmacy\HeparinInfusions\heparin LOW INTENSITY nomogram for [facility] [id number].pdf    11/24/20 1657                Discharge Planning   ELY:      Code Status: Prior   Is the patient medically ready for discharge?:     Reason for patient still in hospital (select all that apply): Patient trending condition  Discharge Plan A: (P) Home            Critical care time spent on the evaluation and treatment of severe organ dysfunction, review of pertinent labs and imaging studies, discussions with consulting providers and discussions with patient/family: over 45  minutes.      Raoul Morse MD  Department of Hospital Medicine   [facility]

## 2020-11-25 NOTE — NURSING
Lab rafat BMP one hour earlier. It is a timed lab. Critical K reported to Dr Hampton. Adjusted times for BMP since pt is currently receiving K supplements. Notified BMP to be at 1000 and every 4 hrs after

## 2020-11-25 NOTE — ASSESSMENT & PLAN NOTE
Minimally abnormal troponin.  Rojas.  Abnormal EKG appears slightly different than previous.  Heparin.  Aspirin Plavix.    11/25/2020:  Chest pain-free.  Troponins flat.  Echocardiogram showed normal left ventricular systolic function.  No significant aortic valve stenosis.  Plan for nuclear myocardial perfusion study today.  If significant ischemia would then plan on heart catheterization.

## 2020-11-25 NOTE — PROGRESS NOTES
eICU Brief Admission Note       Briefly, 69-year-old male with a PMHx of CAD, CHF, DM, and HTN presents to the ED, per EMS, complaining of intermittent chest pain x 3 days.    Video assessment :  lying comfortably in bed     Vitals reviewed   Afebrile, stable vitals     LABs reviewed       Radiology reviewed         Assessment / Plan :  Chest pain -- NSTEMI   Hypokalemia , low Cl , low Mg   Alcohol intoxication; watch for DTs   Moderate AS   ELIZABETH / CKD ; wide AG   HTN   CAD, CHF   DM   - on Heparin drip   - on ASA, statin, BB   - TTE   - trend trops / EKG; f/u cardiology   - Folate, MVT, thiamine  - check serum ketones, Lactate, CPK , phosphorus    - Insulin drip    - correct electrolytes       DVT Px : Heparin drip   GI Px : N/A      Patient seen over video : Yes        1:12 AM   K 2.6   - IV 20 + po 60 KCl   - DC IVF   - IV 1 g + po 800 mg Mg   - check TTKG ; serum Renin:Aldosterone level   - may need nephrology consult in AM   Chart reviewed :Yes  Spoke with RN : yes

## 2020-11-25 NOTE — PLAN OF CARE
Discharge Planning Aassessment completed with patient's assistance . Prior to admit, patient was (independent).  Patient had no OP services and the only DME is a RW.  Patient's sister to  provided in-home assistance.  Patient's fwill need transportation assistance at discharge.  He normally uses public transportation or cabl.  Patient receives [facility] at [facility],  When medically stable, patient will discharge to home .          11/24/20 2135   Discharge Assessment   Assessment Type Discharge Planning Assessment   Confirmed/corrected address and phone number on facesheet? Yes   Assessment information obtained from? Patient   Expected Length of Stay (days) 2   Communicated expected length of stay with patient/caregiver yes   Prior to hospitilization cognitive status: Alert/Oriented   Prior to hospitalization functional status: Assistive Equipment;Independent   Current cognitive status: Alert/Oriented   Current Functional Status: Independent;Assistive Equipment   Facility Arrived From: home   Lives With alone   Able to Return to Prior Arrangements yes   Is patient able to care for self after discharge? Yes   Who are your caregiver(s) and their phone number(s)? sister; Dulce Ramirez  [phone]   Patient's perception of discharge disposition home or selfcare   Readmission Within the Last 30 Days no previous admission in last 30 days   Patient currently being followed by outpatient case management? No   Patient currently receives any other outside agency services? No   Equipment Currently Used at Home walker, rolling   Part D Coverage [employer]   Do you have any problems affording any of your prescribed medications? No   Is the patient taking medications as prescribed? yes   Does the patient have transportation home? No   Dialysis Name and Scheduled days n/a   Does the patient receive services at the [facility]mad[facility] Clinic? No   Discharge Plan A Home   Discharge Plan B Home   DME Needed Upon Discharge  none    Patient/Family in Agreement with Plan yes   Ariana Holland LMSW, CCM  11/24/20

## 2020-11-25 NOTE — CARE UPDATE
[facility]  ICU Shift Summary  Date: 11/25/2020      COVID Test: (--)  Isolation: No active isolations     Prehospitalization: Home  Admit Date / LOS : 11/24/2020/ 1 days    Diagnosis: NSTEMI (non-ST elevated myocardial infarction)    Consults:        Active: Cardio       Needed: N/A     Code Status: Prior   Advanced Directive: <no information>    LDA: PIV       Central Lines/Site/Justification:Patient Does Not Have Central Line       Urinary Cath/Order/Justification:Patient Does Not Have Urinary Catheter    Vasopressors/Infusions:    sodium chloride 0.45% 100 mL/hr at 11/25/20 0300    heparin (porcine) in D5W 14.046 Units/kg/hr (11/25/20 0300)          GOALS: Volume/ Hemodynamic: N/A                     RASS: 0  alert and calm    Pain Management: none       Pain Controlled: not applicable     Rhythm: NSR    Respiratory Device: Nasal Cannula                  Most Recent SBT/ SAT: N/A       MOVE Screen:     VTE Prophylaxis: Pharm and Mechanical  Mobility: Bedrest  Stress Ulcer Prophylaxis: Yes    Dietary: NPO  Tolerance: not applicable  /  Advancement:     I & O (24h):    Intake/Output Summary (Last 24 hours) at 11/25/2020 0332  Last data filed at 11/25/2020 0300  Gross per 24 hour   Intake 1973.96 ml   Output 300 ml   Net 1673.96 ml        Restraints: No    Significant Dates:  Post Op Date: N/A  Rescue Date: N/A  Imaging/ Diagnostics: N/A    Noteworthy Labs:      CBC/Anemia Labs: Coags:    Recent Labs   Lab 11/24/20  1519   WBC 8.27   HGB 11.2*   HCT 31.5*      MCV 79*   RDW 12.4    Recent Labs   Lab 11/24/20  1519 11/25/20  0003   INR 1.0  --    APTT 26.0 35.5*        Chemistries:   Recent Labs   Lab 11/24/20  1519 11/24/20  1834 11/24/20 2001 11/24/20 2157 11/25/20  0003    139  --   --  140   K <2.0* 2.5* 2.4* 2.3* 2.6*   CL 82* 83*  --   --  84*   CO2 39* 33*  --   --  38*   BUN 31* 29*  --   --  28*   CREATININE 2.5* 2.3*  --   --  2.0*   CALCIUM 8.9 8.9  --   --  8.6*   PROT  7.2  --   --   --   --    BILITOT 1.1*  --   --   --   --    ALKPHOS 72  --   --   --   --    ALT 28  --   --   --   --    AST 47*  --   --   --   --    MG 1.4*  --  2.1 1.8  --    PHOS  --   --  2.8  --   --         Cardiac Enzymes: Ejection Fractions:    Recent Labs     11/24/20 2001 11/24/20 2157   *  --    TROPONINI 0.060* 0.067*    No results found for: EF     POCT Glucose: HbA1c:    Recent Labs   Lab 11/24/20 1931 11/24/20 2013   POCTGLUCOSE 276* 304*    Hemoglobin A1C   Date Value Ref Range Status   03/20/2020 10.1 (H) 4.0 - 5.6 % Final     Comment:     ADA Screening Guidelines:  5.7-6.4%  Consistent with prediabetes  >or=6.5%  Consistent with diabetes  High levels of fetal hemoglobin interfere with the HbA1C  assay. Heterozygous hemoglobin variants (HbS, HgC, etc)do  not significantly interfere with this assay.   However, presence of multiple variants may affect accuracy.     03/20/2020 10.1 (H) 4.0 - 5.6 % Final     Comment:     ADA Screening Guidelines:  5.7-6.4%  Consistent with prediabetes  >or=6.5%  Consistent with diabetes  High levels of fetal hemoglobin interfere with the HbA1C  assay. Heterozygous hemoglobin variants (HbS, HgC, etc)do  not significantly interfere with this assay.   However, presence of multiple variants may affect accuracy.             ICU LOS 7h  Level of Care: Critical Care    Shift Summary/Plan for the shift: Please see Care Planning Notes

## 2020-11-25 NOTE — ASSESSMENT & PLAN NOTE
Replaced agressive IV and PO,will monitor closely in ICU with BPM Q 4 hours.Hypokalemia and Hypomagnesemia is gradually improving,will continue replace and monitor closely.

## 2020-11-25 NOTE — ASSESSMENT & PLAN NOTE
According to the patient history of a stent many years ago.  Patient will need repeat heart catheterization.  Would like to do 2D echocardiogram to reassess his aortic valve.  There is definitive concern if the stenosis has progressed.  Would need left and right heart catheterization.    11/25/2020:  Flat troponins.  Chest pain free.  Normal left ventricular systolic function.  No significant aortic valve stenosis.  Nuclear stress today.  If significant ischemia would plan on heart catheterization.  Otherwise continue medical management.

## 2020-11-25 NOTE — SUBJECTIVE & OBJECTIVE
Past Medical History:   Diagnosis Date    Acute congestive heart failure 3/20/2020    Alcohol abuse     Arthritis     Coronary artery disease     Diabetes mellitus     Hyperlipemia     Hypertension     Rhabdomyolysis        Past Surgical History:   Procedure Laterality Date    EYE SURGERY      VASCULAR SURGERY         Review of patient's allergies indicates:  No Known Allergies    No current facility-administered medications on file prior to encounter.      Current Outpatient Medications on File Prior to Encounter   Medication Sig    amlodipine (NORVASC) 10 MG tablet Take 10 mg by mouth once daily.    aspirin (ECOTRIN) 81 MG EC tablet Take 1 tablet (81 mg total) by mouth once daily.    aspirin-calcium carbonate 81 mg-300 mg calcium(777 mg) Tab Take 81 mg by mouth.    atorvastatin (LIPITOR) 80 MG tablet Take 80 mg by mouth every evening.    atropine 1% (ISOPTO ATROPINE) 1 % Drop INT 1 GTT INTO OD QD FOR 1 WK    carvedilol (COREG) 12.5 MG tablet Take 1 tablet (12.5 mg total) by mouth 2 (two) times daily. (Patient taking differently: Take 25 mg by mouth 2 (two) times daily. )    colchicine (COLCRYS) 0.6 mg tablet Take 1 tablet (0.6 mg total) by mouth once daily. for 14 days    DUREZOL 0.05 % Drop ophthalmic solution INT 1 GTT INTO OD FID FOR 3 WEEKS    fenofibrate 160 MG Tab Take 160 mg by mouth.    folic acid (FOLVITE) 1 MG tablet Take 1 mg by mouth.    furosemide (LASIX) 40 MG tablet Take 1 tablet (40 mg total) by mouth 2 (two) times daily.    insulin syringe-needle U-100 1 mL 31 gauge x 5/16 Syrg USE TO INJECT INSULIN TWICE A DAY    lansoprazole (PREVACID) 30 MG capsule Take 30 mg by mouth once daily.    losartan-hydrochlorothiazide 100-25 mg (HYZAAR) 100-25 mg per tablet Take 1 tablet by mouth once daily.    magnesium oxide (MAG-OX) 400 mg (241.3 mg magnesium) tablet Take 800 mg by mouth.    metformin (GLUCOPHAGE) 1000 MG tablet Take 1,000 mg by mouth 2 (two) times daily with meals.     NOVOLOG MIX 70-30 U-100 INSULN 100 unit/mL (70-30) Soln INJ 55 UNI SC QAM AND 45 UNI QPM    pantoprazole (PROTONIX) 40 MG tablet Take 1 tablet (40 mg total) by mouth 2 (two) times daily.    rosuvastatin (CRESTOR) 40 MG Tab Take 10 mg by mouth once daily.    sildenafil (VIAGRA) 100 MG tablet Take 1 tablet (100 mg total) by mouth daily as needed for Erectile Dysfunction. *generic tabs*    tamsulosin (FLOMAX) 0.4 mg Cap Take 1 capsule (0.4 mg total) by mouth once daily.    terbinafine HCl (LAMISIL) 250 mg tablet      Family History     Problem Relation (Age of Onset)    Diabetes Mother, Father, Sister    Hypertension Mother, Father, Sister        Tobacco Use    Smoking status: Never Smoker    Smokeless tobacco: Never Used   Substance and Sexual Activity    Alcohol use: Yes     Alcohol/week: 4.0 standard drinks     Types: 4 Cans of beer per week    Drug use: No    Sexual activity: Not on file     Review of Systems   Constitutional: Positive for activity change and appetite change.   HENT: Negative for congestion and dental problem.    Eyes: Negative for discharge and itching.   Respiratory: Negative for apnea, chest tightness and shortness of breath.    Cardiovascular: Positive for chest pain. Negative for leg swelling.   Gastrointestinal: Negative for abdominal distention and abdominal pain.   Endocrine: Negative for cold intolerance and polydipsia.   Genitourinary: Negative for difficulty urinating and dysuria.   Musculoskeletal: Negative for arthralgias and back pain.   Skin: Negative for color change and pallor.   Allergic/Immunologic: Negative for environmental allergies and food allergies.   Neurological: Negative for dizziness and facial asymmetry.   Hematological: Negative for adenopathy. Does not bruise/bleed easily.   Psychiatric/Behavioral: Negative for agitation and behavioral problems.     Objective:     Vital Signs (Most Recent):  Temp: 98.6 °F (37 °C) (11/25/20 0715)  Pulse: 73 (11/25/20  0800)  Resp: (!) 21 (11/25/20 0800)  BP: (!) 162/108 (11/25/20 0800)  SpO2: 100 % (11/25/20 0800) Vital Signs (24h Range):  Temp:  [98.4 °F (36.9 °C)-99.1 °F (37.3 °C)] 98.6 °F (37 °C)  Pulse:  [69-96] 73  Resp:  [11-26] 21  SpO2:  [100 %] 100 %  BP: (112-174)/() 162/108     Weight: 95.3 kg (210 lb)  Body mass index is 29.29 kg/m².    Physical Exam  Constitutional:       Appearance: Normal appearance.   HENT:      Head: Normocephalic and atraumatic.      Right Ear: Tympanic membrane normal.      Nose: Nose normal.      Mouth/Throat:      Mouth: Mucous membranes are dry.   Eyes:      Extraocular Movements: Extraocular movements intact.      Pupils: Pupils are equal, round, and reactive to light.   Neck:      Musculoskeletal: Normal range of motion and neck supple.   Cardiovascular:      Rate and Rhythm: Normal rate and regular rhythm.      Heart sounds: Murmur present.   Pulmonary:      Effort: Pulmonary effort is normal.      Breath sounds: Normal breath sounds.   Abdominal:      General: Abdomen is flat.      Palpations: Abdomen is soft.   Musculoskeletal: Normal range of motion.         General: No swelling, tenderness, deformity or signs of injury.   Skin:     General: Skin is warm and dry.   Neurological:      General: No focal deficit present.      Mental Status: He is alert. He is disoriented.   Psychiatric:         Mood and Affect: Mood normal.         Behavior: Behavior normal.           CRANIAL NERVES     CN III, IV, VI   Pupils are equal, round, and reactive to light.       Significant Labs:   BMP:   Recent Labs   Lab 11/24/20  2157  11/25/20  0655   GLU  --    < > 181*   NA  --    < > 138   K 2.3*   < > 2.3*   CL  --    < > 85*   CO2  --    < > 39*   BUN  --    < > 25*   CREATININE  --    < > 1.8*   CALCIUM  --    < > 8.2*   MG 1.8  --   --     < > = values in this interval not displayed.     CBC:   Recent Labs   Lab 11/24/20  1519 11/25/20  0433   WBC 8.27 7.08   HGB 11.2* 11.9*   HCT 31.5* 33.8*     197     CMP:   Recent Labs   Lab 11/24/20  1519  11/25/20  0003 11/25/20  0433 11/25/20  0655      < > 140 138 138   K <2.0*   < > 2.6* 2.5* 2.3*   CL 82*   < > 84* 84* 85*   CO2 39*   < > 38* 39* 39*   *   < > 175* 164* 181*   BUN 31*   < > 28* 26* 25*   CREATININE 2.5*   < > 2.0* 1.9* 1.8*   CALCIUM 8.9   < > 8.6* 8.5* 8.2*   PROT 7.2  --   --  7.2  --    ALBUMIN 3.7  --   --  3.6  --    BILITOT 1.1*  --   --  1.3*  --    ALKPHOS 72  --   --  72  --    AST 47*  --   --  47*  --    ALT 28  --   --  27  --    ANIONGAP 18*   < > 18* 15 14   EGFRNONAA 25*   < > 33* 35* 37*    < > = values in this interval not displayed.     Cardiac Markers:   Recent Labs   Lab 11/24/20  1519   BNP 97     Troponin:   Recent Labs   Lab 11/24/20 2001 11/24/20  2157 11/25/20  0655   TROPONINI 0.060* 0.067* 0.070*       Significant Imaging: reviewed.

## 2020-11-25 NOTE — PROGRESS NOTES
[facility]  Cardiology  Progress Note    Patient Name: Chato Bowie  MRN: [id number]  Admission Date: 11/24/2020  Hospital Length of Stay: 1 days  Code Status: Prior   Attending Physician: Raoul Morse MD   Primary Care Physician: Irlanda Montes [facility]  Expected Discharge Date:   Principal Problem:NSTEMI (non-ST elevated myocardial infarction)    Subjective:     Hospital Course:   No notes on file    Interval History:  Chest pain-free    Review of Systems   Cardiovascular: Positive for leg swelling. Negative for chest pain, dyspnea on exertion, irregular heartbeat, orthopnea, palpitations, paroxysmal nocturnal dyspnea and syncope.   Respiratory: Negative for shortness of breath, sputum production and wheezing.      Objective:     Vital Signs (Most Recent):  Temp: 98.6 °F (37 °C) (11/25/20 0715)  Pulse: 69 (11/25/20 0900)  Resp: 16 (11/25/20 0900)  BP: (!) 180/95 (11/25/20 0900)  SpO2: 100 % (11/25/20 0900) Vital Signs (24h Range):  Temp:  [98.4 °F (36.9 °C)-99.1 °F (37.3 °C)] 98.6 °F (37 °C)  Pulse:  [69-96] 69  Resp:  [11-26] 16  SpO2:  [100 %] 100 %  BP: (112-180)/() 180/95     Weight: 95.3 kg (210 lb)  Body mass index is 29.29 kg/m².     SpO2: 100 %  O2 Device (Oxygen Therapy): room air      Intake/Output Summary (Last 24 hours) at 11/25/2020 0908  Last data filed at 11/25/2020 0901  Gross per 24 hour   Intake 2833.53 ml   Output 1100 ml   Net 1733.53 ml       Lines/Drains/Airways     Peripheral Intravenous Line                 Peripheral IV - Single Lumen 11/24/20 1443 18 G Anterior;Left Upper Arm less than 1 day         Peripheral IV - Single Lumen 11/24/20 1834 20 G Left Hand less than 1 day         Peripheral IV - Single Lumen 11/24/20 1834 20 G Right Forearm less than 1 day                Physical Exam   Constitutional: He is oriented to person, place, and time. No distress.   Neck: No JVD present. Carotid bruit is not present.   Cardiovascular: Normal rate, regular rhythm and  intact distal pulses.   Murmur heard.   Crescendo systolic murmur is present with a grade of 3/6.  Pulmonary/Chest: Effort normal and breath sounds normal.   Abdominal: Soft. Bowel sounds are normal. There is no abdominal tenderness.   Musculoskeletal:      Right lower leg: Edema present.      Left lower leg: Edema present.   Neurological: He is alert and oriented to person, place, and time.   Skin: He is not diaphoretic.   Psychiatric: He has a normal mood and affect. His speech is normal and behavior is normal.   Vitals reviewed.      Significant Labs:   BMP:   Recent Labs   Lab 11/24/20  1519 11/24/20 2001 11/24/20 2157 11/25/20  0003 11/25/20  0433 11/25/20  0655   *   < >  --   --  175* 164* 181*      < >  --   --  140 138 138   K <2.0*   < > 2.4* 2.3* 2.6* 2.5* 2.3*   CL 82*   < >  --   --  84* 84* 85*   CO2 39*   < >  --   --  38* 39* 39*   BUN 31*   < >  --   --  28* 26* 25*   CREATININE 2.5*   < >  --   --  2.0* 1.9* 1.8*   CALCIUM 8.9   < >  --   --  8.6* 8.5* 8.2*   MG 1.4*  --  2.1 1.8  --   --   --     < > = values in this interval not displayed.   , CBC   Recent Labs   Lab 11/24/20 1519 11/25/20  0433   WBC 8.27 7.08   HGB 11.2* 11.9*   HCT 31.5* 33.8*    197   , Lipid Panel   Recent Labs   Lab 11/24/20 1519   CHOL 98*   HDL 36*   LDLCALC 15.2*   TRIG 234*   CHOLHDL 36.7    and Troponin   Recent Labs   Lab 11/24/20 2001 11/24/20 2157 11/25/20  0655   TROPONINI 0.060* 0.067* 0.070*       Significant Imaging: Echocardiogram:   Transthoracic echo (TTE) complete (Cupid Only):   Results for orders placed or performed during the hospital encounter of 11/24/20   Echo Color Flow Doppler? Yes   Result Value Ref Range    BSA 2.18 m2    LA WIDTH 4.62 cm    AORTIC VALVE CUSP SEPERATION 1.44 cm    PV PEAK VELOCITY 1.08 cm/s    LVIDd 4.92 3.5 - 6.0 cm    IVS 1.26 (A) 0.6 - 1.1 cm    Posterior Wall 1.32 (A) 0.6 - 1.1 cm    Ao root annulus 2.85 cm    LVIDs 3.20 2.1 - 4.0 cm    FS 35 28 - 44  %    LA volume 101.05 cm3    Sinus 3.92 cm    STJ 3.18 cm    Ascending aorta 4.07 cm    LV mass 252.51 g    LA size 4.49 cm    RVDD 3.74 cm    TAPSE 2.42 cm    RV S' 8.30 cm/s    Left Ventricle Relative Wall Thickness 0.54 cm    AV mean gradient 25 mmHg    AV valve area 1.61 cm2    AV Velocity Ratio 0.29     AV index (prosthetic) 0.29     E/A ratio 0.66     E wave decelartion time 212.97 msec    IVRT 117.65 msec    Pulm vein S/D ratio 1.59     LVOT diameter 2.64 cm    LVOT area 5.5 cm2    LVOT peak david 0.94 m/s    LVOT peak VTI 22.45 cm    Ao peak david 3.23 m/s    Ao VTI 76.12 cm    LVOT stroke volume 122.83 cm3    AV peak gradient 42 mmHg    MV Peak E David 0.71 m/s    TR Max David 2.52 m/s    MV Peak A David 1.07 m/s    PV Peak S David 0.43 m/s    PV Peak D David 0.27 m/s    LV Systolic Volume 40.93 mL    LV Systolic Volume Index 19.0 mL/m2    LV Diastolic Volume 114.00 mL    LV Diastolic Volume Index 52.95 mL/m2    LA Volume Index 46.9 mL/m2    LV Mass Index 117 g/m2    RA Major Axis 5.10 cm    Left Atrium Minor Axis 6.03 cm    Left Atrium Major Axis 5.46 cm    Triscuspid Valve Regurgitation Peak Gradient 25 mmHg    RA Width 3.42 cm    Narrative    · The left ventricle is normal in size with normal systolic function. The   estimated ejection fraction is 55%.  · There is left ventricular concentric hypertrophy.  · Moderate left atrial enlargement.  · Normal left ventricular diastolic function.  · Normal right ventricular size with normal right ventricular systolic   function.  · Mild-to-moderate aortic valve stenosis.  · Aortic valve area is 1.61 cm2; peak velocity is 3.23 m/s; mean gradient   is 25 mmHg.  · Moderate aortic regurgitation.  · Mild mitral regurgitation.  · No pulmonary hypertension        Assessment and Plan:     Brief HPI:     * NSTEMI (non-ST elevated myocardial infarction)  Minimally abnormal troponin.  Rojas.  Abnormal EKG appears slightly different than previous.  Heparin.  Aspirin Plavix.    11/25/2020:   Chest pain-free.  Troponins flat.  Echocardiogram showed normal left ventricular systolic function.  No significant aortic valve stenosis.  Plan for nuclear myocardial perfusion study today.  If significant ischemia would then plan on heart catheterization.    Aortic stenosis, moderate  Follow-up echocardiogram    Acute renal failure superimposed on stage 3 chronic kidney disease  Electrolyte abnormalities.  Creatinine significantly elevated compared to previous.    Hypokalemia  Replace lytes    Essential hypertension  Monitor.    11/25/2020:  Elevated.  Patient just received his medications.  Monitor.    CAD (coronary artery disease)  According to the patient history of a stent many years ago.  Patient will need repeat heart catheterization.  Would like to do 2D echocardiogram to reassess his aortic valve.  There is definitive concern if the stenosis has progressed.  Would need left and right heart catheterization.    11/25/2020:  Flat troponins.  Chest pain free.  Normal left ventricular systolic function.  No significant aortic valve stenosis.  Nuclear stress today.  If significant ischemia would plan on heart catheterization.  Otherwise continue medical management.        VTE Risk Mitigation (From admission, onward)         Ordered     heparin (porcine) injection 5,000 Units  Every 8 hours      11/25/20 0853                Joshua Llamas MD  Cardiology  [facility]

## 2020-11-26 LAB
ALBUMIN SERPL BCP-MCNC: 3.2 G/DL (ref 3.5–5.2)
ALP SERPL-CCNC: 78 U/L (ref 55–135)
ALT SERPL W/O P-5'-P-CCNC: 23 U/L (ref 10–44)
ANION GAP SERPL CALC-SCNC: 11 MMOL/L (ref 8–16)
ANION GAP SERPL CALC-SCNC: 12 MMOL/L (ref 8–16)
ANION GAP SERPL CALC-SCNC: 9 MMOL/L (ref 8–16)
AST SERPL-CCNC: 43 U/L (ref 10–40)
BASOPHILS # BLD AUTO: 0.07 K/UL (ref 0–0.2)
BASOPHILS NFR BLD: 1.1 % (ref 0–1.9)
BILIRUB SERPL-MCNC: 1 MG/DL (ref 0.1–1)
BUN SERPL-MCNC: 18 MG/DL (ref 8–23)
BUN SERPL-MCNC: 19 MG/DL (ref 8–23)
BUN SERPL-MCNC: 20 MG/DL (ref 8–23)
BUN SERPL-MCNC: 20 MG/DL (ref 8–23)
BUN SERPL-MCNC: 21 MG/DL (ref 8–23)
CALCIUM SERPL-MCNC: 7.8 MG/DL (ref 8.7–10.5)
CALCIUM SERPL-MCNC: 7.9 MG/DL (ref 8.7–10.5)
CALCIUM SERPL-MCNC: 8 MG/DL (ref 8.7–10.5)
CHLORIDE SERPL-SCNC: 86 MMOL/L (ref 95–110)
CHLORIDE SERPL-SCNC: 88 MMOL/L (ref 95–110)
CHLORIDE SERPL-SCNC: 88 MMOL/L (ref 95–110)
CHLORIDE SERPL-SCNC: 89 MMOL/L (ref 95–110)
CHLORIDE SERPL-SCNC: 90 MMOL/L (ref 95–110)
CO2 SERPL-SCNC: 32 MMOL/L (ref 23–29)
CO2 SERPL-SCNC: 33 MMOL/L (ref 23–29)
CO2 SERPL-SCNC: 37 MMOL/L (ref 23–29)
CO2 SERPL-SCNC: 37 MMOL/L (ref 23–29)
CO2 SERPL-SCNC: 38 MMOL/L (ref 23–29)
CREAT SERPL-MCNC: 1.7 MG/DL (ref 0.5–1.4)
CREAT SERPL-MCNC: 1.8 MG/DL (ref 0.5–1.4)
CREAT SERPL-MCNC: 2 MG/DL (ref 0.5–1.4)
DIFFERENTIAL METHOD: ABNORMAL
EOSINOPHIL # BLD AUTO: 0.1 K/UL (ref 0–0.5)
EOSINOPHIL NFR BLD: 2.2 % (ref 0–8)
ERYTHROCYTE [DISTWIDTH] IN BLOOD BY AUTOMATED COUNT: 12.2 % (ref 11.5–14.5)
EST. GFR  (AFRICAN AMERICAN): 38 ML/MIN/1.73 M^2
EST. GFR  (AFRICAN AMERICAN): 43 ML/MIN/1.73 M^2
EST. GFR  (AFRICAN AMERICAN): 46 ML/MIN/1.73 M^2
EST. GFR  (NON AFRICAN AMERICAN): 33 ML/MIN/1.73 M^2
EST. GFR  (NON AFRICAN AMERICAN): 37 ML/MIN/1.73 M^2
EST. GFR  (NON AFRICAN AMERICAN): 40 ML/MIN/1.73 M^2
GLUCOSE SERPL-MCNC: 268 MG/DL (ref 70–110)
GLUCOSE SERPL-MCNC: 268 MG/DL (ref 70–110)
GLUCOSE SERPL-MCNC: 295 MG/DL (ref 70–110)
GLUCOSE SERPL-MCNC: 295 MG/DL (ref 70–110)
GLUCOSE SERPL-MCNC: 318 MG/DL (ref 70–110)
HCT VFR BLD AUTO: 28.8 % (ref 40–54)
HGB BLD-MCNC: 10.4 G/DL (ref 14–18)
IMM GRANULOCYTES # BLD AUTO: 0.03 K/UL (ref 0–0.04)
IMM GRANULOCYTES NFR BLD AUTO: 0.5 % (ref 0–0.5)
LYMPHOCYTES # BLD AUTO: 1.1 K/UL (ref 1–4.8)
LYMPHOCYTES NFR BLD: 16.5 % (ref 18–48)
MAGNESIUM SERPL-MCNC: 1.5 MG/DL (ref 1.6–2.6)
MCH RBC QN AUTO: 27.8 PG (ref 27–31)
MCHC RBC AUTO-ENTMCNC: 36.1 G/DL (ref 32–36)
MCV RBC AUTO: 77 FL (ref 82–98)
MONOCYTES # BLD AUTO: 0.8 K/UL (ref 0.3–1)
MONOCYTES NFR BLD: 11.6 % (ref 4–15)
NEUTROPHILS # BLD AUTO: 4.4 K/UL (ref 1.8–7.7)
NEUTROPHILS NFR BLD: 68.1 % (ref 38–73)
NRBC BLD-RTO: 0 /100 WBC
PLATELET # BLD AUTO: 177 K/UL (ref 150–350)
PMV BLD AUTO: 9 FL (ref 9.2–12.9)
POCT GLUCOSE: 270 MG/DL (ref 70–110)
POCT GLUCOSE: 283 MG/DL (ref 70–110)
POCT GLUCOSE: 342 MG/DL (ref 70–110)
POTASSIUM SERPL-SCNC: 2.6 MMOL/L (ref 3.5–5.1)
POTASSIUM SERPL-SCNC: 2.9 MMOL/L (ref 3.5–5.1)
POTASSIUM SERPL-SCNC: 2.9 MMOL/L (ref 3.5–5.1)
POTASSIUM SERPL-SCNC: 3.6 MMOL/L (ref 3.5–5.1)
POTASSIUM SERPL-SCNC: 3.6 MMOL/L (ref 3.5–5.1)
PROT SERPL-MCNC: 6.5 G/DL (ref 6–8.4)
RBC # BLD AUTO: 3.74 M/UL (ref 4.6–6.2)
SODIUM SERPL-SCNC: 131 MMOL/L (ref 136–145)
SODIUM SERPL-SCNC: 134 MMOL/L (ref 136–145)
SODIUM SERPL-SCNC: 135 MMOL/L (ref 136–145)
SODIUM SERPL-SCNC: 136 MMOL/L (ref 136–145)
SODIUM SERPL-SCNC: 136 MMOL/L (ref 136–145)
WBC # BLD AUTO: 6.48 K/UL (ref 3.9–12.7)

## 2020-11-26 PROCEDURE — 85025 COMPLETE CBC W/AUTO DIFF WBC: CPT

## 2020-11-26 PROCEDURE — C9399 UNCLASSIFIED DRUGS OR BIOLOG: HCPCS | Performed by: HOSPITALIST

## 2020-11-26 PROCEDURE — 21400001 HC TELEMETRY ROOM

## 2020-11-26 PROCEDURE — 97162 PT EVAL MOD COMPLEX 30 MIN: CPT | Performed by: PHYSICAL THERAPIST

## 2020-11-26 PROCEDURE — 25000003 PHARM REV CODE 250: Performed by: HOSPITALIST

## 2020-11-26 PROCEDURE — 80048 BASIC METABOLIC PNL TOTAL CA: CPT | Mod: 91

## 2020-11-26 PROCEDURE — 94761 N-INVAS EAR/PLS OXIMETRY MLT: CPT

## 2020-11-26 PROCEDURE — 83735 ASSAY OF MAGNESIUM: CPT

## 2020-11-26 PROCEDURE — 97110 THERAPEUTIC EXERCISES: CPT | Performed by: PHYSICAL THERAPIST

## 2020-11-26 PROCEDURE — 63600175 PHARM REV CODE 636 W HCPCS: Performed by: HOSPITALIST

## 2020-11-26 PROCEDURE — 80048 BASIC METABOLIC PNL TOTAL CA: CPT

## 2020-11-26 PROCEDURE — 80053 COMPREHEN METABOLIC PANEL: CPT

## 2020-11-26 PROCEDURE — 63600175 PHARM REV CODE 636 W HCPCS: Performed by: INTERNAL MEDICINE

## 2020-11-26 PROCEDURE — 36415 COLL VENOUS BLD VENIPUNCTURE: CPT

## 2020-11-26 PROCEDURE — 25000003 PHARM REV CODE 250: Performed by: INTERNAL MEDICINE

## 2020-11-26 RX ORDER — POTASSIUM CHLORIDE 20 MEQ/1
40 TABLET, EXTENDED RELEASE ORAL ONCE
Status: COMPLETED | OUTPATIENT
Start: 2020-11-26 | End: 2020-11-26

## 2020-11-26 RX ORDER — POTASSIUM CHLORIDE 7.45 MG/ML
10 INJECTION INTRAVENOUS
Status: DISCONTINUED | OUTPATIENT
Start: 2020-11-26 | End: 2020-11-26

## 2020-11-26 RX ORDER — POTASSIUM CHLORIDE 14.9 MG/ML
40 INJECTION INTRAVENOUS ONCE
Status: DISCONTINUED | OUTPATIENT
Start: 2020-11-26 | End: 2020-11-26

## 2020-11-26 RX ORDER — POLYETHYLENE GLYCOL 3350 17 G/17G
17 POWDER, FOR SOLUTION ORAL 2 TIMES DAILY
Status: DISCONTINUED | OUTPATIENT
Start: 2020-11-26 | End: 2020-11-27 | Stop reason: HOSPADM

## 2020-11-26 RX ORDER — MAGNESIUM SULFATE HEPTAHYDRATE 40 MG/ML
2 INJECTION, SOLUTION INTRAVENOUS ONCE
Status: COMPLETED | OUTPATIENT
Start: 2020-11-26 | End: 2020-11-26

## 2020-11-26 RX ORDER — DIAZEPAM 5 MG/1
5 TABLET ORAL EVERY 8 HOURS
Status: DISCONTINUED | OUTPATIENT
Start: 2020-11-26 | End: 2020-11-27 | Stop reason: HOSPADM

## 2020-11-26 RX ORDER — SODIUM CHLORIDE AND POTASSIUM CHLORIDE 150; 900 MG/100ML; MG/100ML
INJECTION, SOLUTION INTRAVENOUS CONTINUOUS
Status: DISCONTINUED | OUTPATIENT
Start: 2020-11-26 | End: 2020-11-27 | Stop reason: HOSPADM

## 2020-11-26 RX ADMIN — FOLIC ACID 1 MG: 1 TABLET ORAL at 08:11

## 2020-11-26 RX ADMIN — POLYETHYLENE GLYCOL 3350 17 G: 17 POWDER, FOR SOLUTION ORAL at 09:11

## 2020-11-26 RX ADMIN — DIAZEPAM 5 MG: 5 TABLET ORAL at 02:11

## 2020-11-26 RX ADMIN — POTASSIUM CHLORIDE AND SODIUM CHLORIDE: 450; 150 INJECTION, SOLUTION INTRAVENOUS at 02:11

## 2020-11-26 RX ADMIN — INSULIN ASPART 1 UNITS: 100 INJECTION, SOLUTION INTRAVENOUS; SUBCUTANEOUS at 09:11

## 2020-11-26 RX ADMIN — FAMOTIDINE 20 MG: 10 INJECTION INTRAVENOUS at 08:11

## 2020-11-26 RX ADMIN — POTASSIUM CHLORIDE 10 MEQ: 7.46 INJECTION, SOLUTION INTRAVENOUS at 03:11

## 2020-11-26 RX ADMIN — ATORVASTATIN CALCIUM 40 MG: 40 TABLET, FILM COATED ORAL at 09:11

## 2020-11-26 RX ADMIN — INSULIN ASPART 4 UNITS: 100 INJECTION, SOLUTION INTRAVENOUS; SUBCUTANEOUS at 04:11

## 2020-11-26 RX ADMIN — HEPARIN SODIUM 5000 UNITS: 5000 INJECTION INTRAVENOUS; SUBCUTANEOUS at 09:11

## 2020-11-26 RX ADMIN — HEPARIN SODIUM 5000 UNITS: 5000 INJECTION INTRAVENOUS; SUBCUTANEOUS at 02:11

## 2020-11-26 RX ADMIN — INSULIN ASPART 3 UNITS: 100 INJECTION, SOLUTION INTRAVENOUS; SUBCUTANEOUS at 08:11

## 2020-11-26 RX ADMIN — POTASSIUM CHLORIDE 10 MEQ: 7.46 INJECTION, SOLUTION INTRAVENOUS at 05:11

## 2020-11-26 RX ADMIN — DIAZEPAM 5 MG: 5 TABLET ORAL at 09:11

## 2020-11-26 RX ADMIN — THERA TABS 1 TABLET: TAB at 08:11

## 2020-11-26 RX ADMIN — MUPIROCIN: 20 OINTMENT TOPICAL at 09:11

## 2020-11-26 RX ADMIN — SODIUM CHLORIDE AND POTASSIUM CHLORIDE: 9; 1.49 INJECTION, SOLUTION INTRAVENOUS at 11:11

## 2020-11-26 RX ADMIN — POTASSIUM CHLORIDE 40 MEQ: 1500 TABLET, EXTENDED RELEASE ORAL at 06:11

## 2020-11-26 RX ADMIN — MAGNESIUM SULFATE 2 G: 2 INJECTION INTRAVENOUS at 11:11

## 2020-11-26 RX ADMIN — Medication 100 MG: at 08:11

## 2020-11-26 RX ADMIN — POTASSIUM BICARBONATE 50 MEQ: 978 TABLET, EFFERVESCENT ORAL at 08:11

## 2020-11-26 RX ADMIN — INSULIN DETEMIR 20 UNITS: 100 INJECTION, SOLUTION SUBCUTANEOUS at 05:11

## 2020-11-26 RX ADMIN — INSULIN ASPART 3 UNITS: 100 INJECTION, SOLUTION INTRAVENOUS; SUBCUTANEOUS at 11:11

## 2020-11-26 RX ADMIN — DIAZEPAM 10 MG: 5 TABLET ORAL at 05:11

## 2020-11-26 RX ADMIN — HEPARIN SODIUM 5000 UNITS: 5000 INJECTION INTRAVENOUS; SUBCUTANEOUS at 05:11

## 2020-11-26 RX ADMIN — ASPIRIN 325 MG: 325 TABLET, COATED ORAL at 08:11

## 2020-11-26 RX ADMIN — TAMSULOSIN HYDROCHLORIDE 0.4 MG: 0.4 CAPSULE ORAL at 08:11

## 2020-11-26 RX ADMIN — POLYETHYLENE GLYCOL 3350 17 G: 17 POWDER, FOR SOLUTION ORAL at 11:11

## 2020-11-26 RX ADMIN — METOPROLOL SUCCINATE 25 MG: 25 TABLET, EXTENDED RELEASE ORAL at 08:11

## 2020-11-26 RX ADMIN — AMLODIPINE BESYLATE 10 MG: 5 TABLET ORAL at 08:11

## 2020-11-26 NOTE — PROGRESS NOTES
[facility] Hospital Medicine  Progress Note    Patient Name: Chato Bowie  MRN: [id number]  Patient Class: IP- Inpatient   Admission Date: 11/24/2020  Length of Stay: 2 days  Attending Physician: Raoul Morse MD  Primary Care Provider: [facility]        Subjective:     Principal Problem:NSTEMI (non-ST elevated myocardial infarction)        HPI:  69-year-old male with a PMHx of CAD, CHF with EF of 50%,alcohol abuse,CKD 3,, DM, and HTN presents to the ED, per EMS, complaining of intermittent chest pain x 3 days. He describes his pain as pressure. CP radiates to his neck. CP exacerbating with deep respiration. Reports associated SOB with CP. Reports emesis with CP yesterday. Today, pt states he felt light-headed while shopping prior to onset of CP around 2 pm. States he was standing at a bus stop when his pain began. States CP lasted about 1 hour. Reports of chest pain at this time. States EMS gave ASA en route. Pt states he does not take NTG or ASA at home.in ER has Troponin level of 0.063,EKG wit non specific T wave changes,cardiology was called ,was planing taking to cath lab,but he has potassium level of less than 2 and magnesioum of 1.4 cardiology can not take patient to cath lab at this time,electrolytes is replaced IV and PO.he has been also started on IVF for ARF on CKD 3,he is hemodynamically stable and he is on RA,he is already on heparin drip.his last alcohol consumption was yesterday,drink 2 pint of whisky.    Overview/Hospital Course:  69-year-old male with a PMHx of CAD, CHF with EF of 50%,alcohol abuse,CKD 3,, DM, and HTN presents to the ED, per EMS, complaining of intermittent chest pain x 3 days. He describes his pain as pressure. CP radiates to his neck. CP exacerbating with deep respiration. Reports associated SOB with CP. Reports emesis with CP , pt states he felt light-headed while shopping prior to onset of CP around 2 pm. States he was standing at a bus stop  when his pain began. States CP lasted about 1 hour.  States EMS gave ASA en route. Pt states he does not take NTG or ASA at home.in ER has Troponin level of 0.063,EKG wit non specific T wave changes,cardiology was called ,was planing taking to cath lab,but he has potassium level of less than 2 and magnesioum of 1.4 cardiology could not take patient to cath lab ,electrolytes is replaced IV and PO.he has been also started on IVF for ARF on CKD 3,he is hemodynamically stable and he is on RA,he is already on heparin drip.his last alcohol consumption was day before admission,,drink 2 pint of whisky every orgher day.  Hypokalemia and Hypomagnesemia is gradually improving,will continue replace and monitor closely.  CRT is improving,consulted nephrology jake arguelles.  Troponin  level remains flat,chest pain is improved.SNT show no acute ischemia,    Past Medical History:   Diagnosis Date    Acute congestive heart failure 3/20/2020    Alcohol abuse     Arthritis     Coronary artery disease     Diabetes mellitus     Hyperlipemia     Hypertension     Rhabdomyolysis        Past Surgical History:   Procedure Laterality Date    EYE SURGERY      VASCULAR SURGERY         Review of patient's allergies indicates:  No Known Allergies    No current facility-administered medications on file prior to encounter.      Current Outpatient Medications on File Prior to Encounter   Medication Sig    amlodipine (NORVASC) 10 MG tablet Take 10 mg by mouth once daily.    aspirin (ECOTRIN) 81 MG EC tablet Take 1 tablet (81 mg total) by mouth once daily.    aspirin-calcium carbonate 81 mg-300 mg calcium(777 mg) Tab Take 81 mg by mouth.    atorvastatin (LIPITOR) 80 MG tablet Take 80 mg by mouth every evening.    atropine 1% (ISOPTO ATROPINE) 1 % Drop INT 1 GTT INTO OD QD FOR 1 WK    carvedilol (COREG) 12.5 MG tablet Take 1 tablet (12.5 mg total) by mouth 2 (two) times daily. (Patient taking differently: Take 25 mg by mouth 2 (two) times  daily. )    colchicine (COLCRYS) 0.6 mg tablet Take 1 tablet (0.6 mg total) by mouth once daily. for 14 days    DUREZOL 0.05 % Drop ophthalmic solution INT 1 GTT INTO OD FID FOR 3 WEEKS    fenofibrate 160 MG Tab Take 160 mg by mouth.    folic acid (FOLVITE) 1 MG tablet Take 1 mg by mouth.    furosemide (LASIX) 40 MG tablet Take 1 tablet (40 mg total) by mouth 2 (two) times daily.    insulin syringe-needle U-100 1 mL 31 gauge x 5/16 Syrg USE TO INJECT INSULIN TWICE A DAY    lansoprazole (PREVACID) 30 MG capsule Take 30 mg by mouth once daily.    losartan-hydrochlorothiazide 100-25 mg (HYZAAR) 100-25 mg per tablet Take 1 tablet by mouth once daily.    magnesium oxide (MAG-OX) 400 mg (241.3 mg magnesium) tablet Take 800 mg by mouth.    metformin (GLUCOPHAGE) 1000 MG tablet Take 1,000 mg by mouth 2 (two) times daily with meals.    NOVOLOG MIX 70-30 U-100 INSULN 100 unit/mL (70-30) Soln INJ 55 UNI SC QAM AND 45 UNI QPM    pantoprazole (PROTONIX) 40 MG tablet Take 1 tablet (40 mg total) by mouth 2 (two) times daily.    rosuvastatin (CRESTOR) 40 MG Tab Take 10 mg by mouth once daily.    sildenafil (VIAGRA) 100 MG tablet Take 1 tablet (100 mg total) by mouth daily as needed for Erectile Dysfunction. *generic tabs*    tamsulosin (FLOMAX) 0.4 mg Cap Take 1 capsule (0.4 mg total) by mouth once daily.    terbinafine HCl (LAMISIL) 250 mg tablet      Family History     Problem Relation (Age of Onset)    Diabetes Mother, Father, Sister    Hypertension Mother, Father, Sister        Tobacco Use    Smoking status: Never Smoker    Smokeless tobacco: Never Used   Substance and Sexual Activity    Alcohol use: Yes     Alcohol/week: 4.0 standard drinks     Types: 4 Cans of beer per week    Drug use: No    Sexual activity: Not on file     Review of Systems   Constitutional: Positive for activity change and appetite change.   HENT: Negative for congestion and dental problem.    Eyes: Negative for discharge and itching.    Respiratory: Negative for apnea, chest tightness and shortness of breath.    Cardiovascular: Positive for chest pain. Negative for leg swelling.   Gastrointestinal: Negative for abdominal distention and abdominal pain.   Endocrine: Negative for cold intolerance and polydipsia.   Genitourinary: Negative for difficulty urinating and dysuria.   Musculoskeletal: Negative for arthralgias and back pain.   Skin: Negative for color change and pallor.   Allergic/Immunologic: Negative for environmental allergies and food allergies.   Neurological: Negative for dizziness and facial asymmetry.   Hematological: Negative for adenopathy. Does not bruise/bleed easily.   Psychiatric/Behavioral: Negative for agitation and behavioral problems.     Objective:     Vital Signs (Most Recent):  Temp: 98 °F (36.7 °C) (11/26/20 0739)  Pulse: 71 (11/26/20 0739)  Resp: 18 (11/26/20 0739)  BP: (!) 160/81 (11/26/20 0739)  SpO2: 95 % (11/26/20 0739) Vital Signs (24h Range):  Temp:  [97.5 °F (36.4 °C)-98.9 °F (37.2 °C)] 98 °F (36.7 °C)  Pulse:  [71-86] 71  Resp:  [18] 18  SpO2:  [95 %-97 %] 95 %  BP: (118-160)/(66-86) 160/81     Weight: 95.3 kg (210 lb)  Body mass index is 29.29 kg/m².    Physical Exam  Constitutional:       Appearance: Normal appearance.   HENT:      Head: Normocephalic and atraumatic.      Right Ear: Tympanic membrane normal.      Nose: Nose normal.      Mouth/Throat:      Mouth: Mucous membranes are dry.   Eyes:      Extraocular Movements: Extraocular movements intact.      Pupils: Pupils are equal, round, and reactive to light.   Neck:      Musculoskeletal: Normal range of motion and neck supple.   Cardiovascular:      Rate and Rhythm: Normal rate and regular rhythm.      Heart sounds: Murmur present.   Pulmonary:      Effort: Pulmonary effort is normal.      Breath sounds: Normal breath sounds.   Abdominal:      General: Abdomen is flat.      Palpations: Abdomen is soft.   Musculoskeletal: Normal range of motion.          General: No swelling, tenderness, deformity or signs of injury.   Skin:     General: Skin is warm and dry.   Neurological:      General: No focal deficit present.      Mental Status: He is alert. He is disoriented.   Psychiatric:         Mood and Affect: Mood normal.         Behavior: Behavior normal.           CRANIAL NERVES     CN III, IV, VI   Pupils are equal, round, and reactive to light.       Significant Labs:   BMP:   Recent Labs   Lab 11/26/20  0217  11/26/20  1013   *   < > 295*   *   < > 131*   K 2.6*   < > 3.6   CL 86*   < > 89*   CO2 38*   < > 33*   BUN 21   < > 19   CREATININE 2.0*   < > 1.8*   CALCIUM 7.8*   < > 7.9*   MG 1.5*  --   --     < > = values in this interval not displayed.     CBC:   Recent Labs   Lab 11/24/20  1519 11/25/20  0433 11/26/20  0217   WBC 8.27 7.08 6.48   HGB 11.2* 11.9* 10.4*   HCT 31.5* 33.8* 28.8*    197 177     CMP:   Recent Labs   Lab 11/24/20  1519  11/25/20  0433  11/26/20  0217 11/26/20  0638 11/26/20  1013      < > 138   < > 135* 136  136 131*   K <2.0*   < > 2.5*   < > 2.6* 2.9*  2.9* 3.6   CL 82*   < > 84*   < > 86* 88*  88* 89*   CO2 39*   < > 39*   < > 38* 37*  37* 33*   *   < > 164*   < > 295* 268*  268* 295*   BUN 31*   < > 26*   < > 21 20  20 19   CREATININE 2.5*   < > 1.9*   < > 2.0* 1.8*  1.8* 1.8*   CALCIUM 8.9   < > 8.5*   < > 7.8* 7.9*  7.9* 7.9*   PROT 7.2  --  7.2  --   --  6.5  --    ALBUMIN 3.7  --  3.6  --   --  3.2*  --    BILITOT 1.1*  --  1.3*  --   --  1.0  --    ALKPHOS 72  --  72  --   --  78  --    AST 47*  --  47*  --   --  43*  --    ALT 28  --  27  --   --  23  --    ANIONGAP 18*   < > 15   < > 11 11  11 9   EGFRNONAA 25*   < > 35*   < > 33* 37*  37* 37*    < > = values in this interval not displayed.     Cardiac Markers:   Recent Labs   Lab 11/24/20  1519   BNP 97     Troponin:   Recent Labs   Lab 11/24/20 2001 11/24/20  2157 11/25/20  0655   TROPONINI 0.060* 0.067* 0.070*       Significant  Imaging: reviewed.      Assessment/Plan:      * NSTEMI (non-ST elevated myocardial infarction)   Patient with intermittent chest pain with many risk factories in last 3 days,has  Troponin level of 0.063,EKG with non specific T wave changes,cardiology was called ,was planing taking to cath lab,but he has potassium level of less than 2 and magnesioum of 1.4 cardiology can not take patient to cath lab at this time,electrolytes is replaced IV and PO.,he has been also  already on heparin drip.NPO past MN.cardiology is consulted.  Troponin  level remains flat,chest pain is improved.SNT show no acute ischemia,EF is preserved .      Hypokalemia  Replaced agressive IV and PO,will monitor closely in ICU with BPM Q 4 hours.Hypokalemia and Hypomagnesemia is gradually improving,will continue replace and monitor closely.        Hypomagnesemia  replaced per IV.      Type 2 diabetes mellitus with hyperglycemia  On SSI.      Aortic stenosis, moderate  Per Echo,has loud systolic murmur,on medical treatment.      BPH (benign prostatic hyperplasia)  Resumed Flomax.      Chronic combined systolic and diastolic congestive heart failure  With EF of 50%,will monitor for fluid overload.      CKD (chronic kidney disease), stage III  Baseline CRT is 1.6.will monitor,.      Acute renal failure superimposed on stage 3 chronic kidney disease  Baseline CRT is 1.6,will continue with IVF,folow up with labs,  CRT is improving,consulted nephrology jake arguelles.    Alcohol abuse  consulted need quit drinking,on DT precaution with scheduled valium,also vitamin with prn IV Ativan.drink usually 2 pint whisky every other day.      Anemia of chronic disease  Stable,will monitor.      Hyperlipidemia  Check lipid panel,on statin.      Essential hypertension  Stable at this time, stared on BB,prn Clonidine.adede Norvasc.      CAD (coronary artery disease)  On ACS medications,ASA,plavix,statin,BB,not on ACE or ARB duo to ARF,check lipid panel.        VTE Risk  Mitigation (From admission, onward)         Ordered     heparin (porcine) injection 5,000 Units  Every 8 hours      11/25/20 0853                Discharge Planning   ELY:      Code Status: Prior   Is the patient medically ready for discharge?:     Reason for patient still in hospital (select all that apply): Patient trending condition  Discharge Plan A: (P) Home                  Raoul Morse MD  Department of Hospital Medicine   [facility]

## 2020-11-26 NOTE — SUBJECTIVE & OBJECTIVE
Past Medical History:   Diagnosis Date    Acute congestive heart failure 3/20/2020    Alcohol abuse     Arthritis     Coronary artery disease     Diabetes mellitus     Hyperlipemia     Hypertension     Rhabdomyolysis        Past Surgical History:   Procedure Laterality Date    EYE SURGERY      VASCULAR SURGERY         Review of patient's allergies indicates:  No Known Allergies    No current facility-administered medications on file prior to encounter.      Current Outpatient Medications on File Prior to Encounter   Medication Sig    amlodipine (NORVASC) 10 MG tablet Take 10 mg by mouth once daily.    aspirin (ECOTRIN) 81 MG EC tablet Take 1 tablet (81 mg total) by mouth once daily.    aspirin-calcium carbonate 81 mg-300 mg calcium(777 mg) Tab Take 81 mg by mouth.    atorvastatin (LIPITOR) 80 MG tablet Take 80 mg by mouth every evening.    atropine 1% (ISOPTO ATROPINE) 1 % Drop INT 1 GTT INTO OD QD FOR 1 WK    carvedilol (COREG) 12.5 MG tablet Take 1 tablet (12.5 mg total) by mouth 2 (two) times daily. (Patient taking differently: Take 25 mg by mouth 2 (two) times daily. )    colchicine (COLCRYS) 0.6 mg tablet Take 1 tablet (0.6 mg total) by mouth once daily. for 14 days    DUREZOL 0.05 % Drop ophthalmic solution INT 1 GTT INTO OD FID FOR 3 WEEKS    fenofibrate 160 MG Tab Take 160 mg by mouth.    folic acid (FOLVITE) 1 MG tablet Take 1 mg by mouth.    furosemide (LASIX) 40 MG tablet Take 1 tablet (40 mg total) by mouth 2 (two) times daily.    insulin syringe-needle U-100 1 mL 31 gauge x 5/16 Syrg USE TO INJECT INSULIN TWICE A DAY    lansoprazole (PREVACID) 30 MG capsule Take 30 mg by mouth once daily.    losartan-hydrochlorothiazide 100-25 mg (HYZAAR) 100-25 mg per tablet Take 1 tablet by mouth once daily.    magnesium oxide (MAG-OX) 400 mg (241.3 mg magnesium) tablet Take 800 mg by mouth.    metformin (GLUCOPHAGE) 1000 MG tablet Take 1,000 mg by mouth 2 (two) times daily with meals.     NOVOLOG MIX 70-30 U-100 INSULN 100 unit/mL (70-30) Soln INJ 55 UNI SC QAM AND 45 UNI QPM    pantoprazole (PROTONIX) 40 MG tablet Take 1 tablet (40 mg total) by mouth 2 (two) times daily.    rosuvastatin (CRESTOR) 40 MG Tab Take 10 mg by mouth once daily.    sildenafil (VIAGRA) 100 MG tablet Take 1 tablet (100 mg total) by mouth daily as needed for Erectile Dysfunction. *generic tabs*    tamsulosin (FLOMAX) 0.4 mg Cap Take 1 capsule (0.4 mg total) by mouth once daily.    terbinafine HCl (LAMISIL) 250 mg tablet      Family History     Problem Relation (Age of Onset)    Diabetes Mother, Father, Sister    Hypertension Mother, Father, Sister        Tobacco Use    Smoking status: Never Smoker    Smokeless tobacco: Never Used   Substance and Sexual Activity    Alcohol use: Yes     Alcohol/week: 4.0 standard drinks     Types: 4 Cans of beer per week    Drug use: No    Sexual activity: Not on file     Review of Systems   Constitutional: Positive for activity change and appetite change.   HENT: Negative for congestion and dental problem.    Eyes: Negative for discharge and itching.   Respiratory: Negative for apnea, chest tightness and shortness of breath.    Cardiovascular: Positive for chest pain. Negative for leg swelling.   Gastrointestinal: Negative for abdominal distention and abdominal pain.   Endocrine: Negative for cold intolerance and polydipsia.   Genitourinary: Negative for difficulty urinating and dysuria.   Musculoskeletal: Negative for arthralgias and back pain.   Skin: Negative for color change and pallor.   Allergic/Immunologic: Negative for environmental allergies and food allergies.   Neurological: Negative for dizziness and facial asymmetry.   Hematological: Negative for adenopathy. Does not bruise/bleed easily.   Psychiatric/Behavioral: Negative for agitation and behavioral problems.     Objective:     Vital Signs (Most Recent):  Temp: 98 °F (36.7 °C) (11/26/20 0739)  Pulse: 71 (11/26/20  0739)  Resp: 18 (11/26/20 0739)  BP: (!) 160/81 (11/26/20 0739)  SpO2: 95 % (11/26/20 0739) Vital Signs (24h Range):  Temp:  [97.5 °F (36.4 °C)-98.9 °F (37.2 °C)] 98 °F (36.7 °C)  Pulse:  [71-86] 71  Resp:  [18] 18  SpO2:  [95 %-97 %] 95 %  BP: (118-160)/(66-86) 160/81     Weight: 95.3 kg (210 lb)  Body mass index is 29.29 kg/m².    Physical Exam  Constitutional:       Appearance: Normal appearance.   HENT:      Head: Normocephalic and atraumatic.      Right Ear: Tympanic membrane normal.      Nose: Nose normal.      Mouth/Throat:      Mouth: Mucous membranes are dry.   Eyes:      Extraocular Movements: Extraocular movements intact.      Pupils: Pupils are equal, round, and reactive to light.   Neck:      Musculoskeletal: Normal range of motion and neck supple.   Cardiovascular:      Rate and Rhythm: Normal rate and regular rhythm.      Heart sounds: Murmur present.   Pulmonary:      Effort: Pulmonary effort is normal.      Breath sounds: Normal breath sounds.   Abdominal:      General: Abdomen is flat.      Palpations: Abdomen is soft.   Musculoskeletal: Normal range of motion.         General: No swelling, tenderness, deformity or signs of injury.   Skin:     General: Skin is warm and dry.   Neurological:      General: No focal deficit present.      Mental Status: He is alert. He is disoriented.   Psychiatric:         Mood and Affect: Mood normal.         Behavior: Behavior normal.           CRANIAL NERVES     CN III, IV, VI   Pupils are equal, round, and reactive to light.       Significant Labs:   BMP:   Recent Labs   Lab 11/26/20 0217  11/26/20  1013   *   < > 295*   *   < > 131*   K 2.6*   < > 3.6   CL 86*   < > 89*   CO2 38*   < > 33*   BUN 21   < > 19   CREATININE 2.0*   < > 1.8*   CALCIUM 7.8*   < > 7.9*   MG 1.5*  --   --     < > = values in this interval not displayed.     CBC:   Recent Labs   Lab 11/24/20  1519 11/25/20  0433 11/26/20 0217   WBC 8.27 7.08 6.48   HGB 11.2* 11.9* 10.4*   HCT  31.5* 33.8* 28.8*    197 177     CMP:   Recent Labs   Lab 11/24/20  1519  11/25/20  0433  11/26/20  0217 11/26/20  0638 11/26/20  1013      < > 138   < > 135* 136  136 131*   K <2.0*   < > 2.5*   < > 2.6* 2.9*  2.9* 3.6   CL 82*   < > 84*   < > 86* 88*  88* 89*   CO2 39*   < > 39*   < > 38* 37*  37* 33*   *   < > 164*   < > 295* 268*  268* 295*   BUN 31*   < > 26*   < > 21 20  20 19   CREATININE 2.5*   < > 1.9*   < > 2.0* 1.8*  1.8* 1.8*   CALCIUM 8.9   < > 8.5*   < > 7.8* 7.9*  7.9* 7.9*   PROT 7.2  --  7.2  --   --  6.5  --    ALBUMIN 3.7  --  3.6  --   --  3.2*  --    BILITOT 1.1*  --  1.3*  --   --  1.0  --    ALKPHOS 72  --  72  --   --  78  --    AST 47*  --  47*  --   --  43*  --    ALT 28  --  27  --   --  23  --    ANIONGAP 18*   < > 15   < > 11 11  11 9   EGFRNONAA 25*   < > 35*   < > 33* 37*  37* 37*    < > = values in this interval not displayed.     Cardiac Markers:   Recent Labs   Lab 11/24/20  1519   BNP 97     Troponin:   Recent Labs   Lab 11/24/20 2001 11/24/20  2157 11/25/20  0655   TROPONINI 0.060* 0.067* 0.070*       Significant Imaging: reviewed.

## 2020-11-26 NOTE — PROGRESS NOTES
Chato Bowie is a 70 y.o. male patient.    Follow for ELIZABETH, electrolyte abnormalities    No c/o feeling better    Scheduled Meds:   amLODIPine  10 mg Oral Daily    aspirin  325 mg Oral Daily    atorvastatin  40 mg Oral QHS    diazePAM  5 mg Oral Q8H    famotidine (PF)  20 mg Intravenous Daily    folic acid  1 mg Oral Daily    heparin (porcine)  5,000 Units Subcutaneous Q8H    metoprolol succinate  25 mg Oral Daily    multivitamin  1 tablet Oral Daily    mupirocin   Nasal BID    polyethylene glycol  17 g Oral BID    tamsulosin  0.4 mg Oral Daily    thiamine  100 mg Oral Daily       Review of patient's allergies indicates:  No Known Allergies      Vital Signs Range (Last 24H):  Temp:  [97.5 °F (36.4 °C)-98.9 °F (37.2 °C)]   Pulse:  [71-86]   Resp:  [18]   BP: (118-160)/(66-86)   SpO2:  [95 %-97 %]     I & O (Last 24H):    Intake/Output Summary (Last 24 hours) at 11/26/2020 1357  Last data filed at 11/26/2020 0900  Gross per 24 hour   Intake 2100 ml   Output 1355 ml   Net 745 ml           Physical Exam:  General appearance: well developed, well nourished, no distress  Lungs:  clear to auscultation bilaterally and normal respiratory effort  Heart: regular rate and rhythm  Abdomen: soft, non-tender non-distented; bowel sounds normal; no masses,  no organomegaly  Extremities: no cyanosis or edema, or clubbing    Laboratory:  CBC:   Recent Labs   Lab 11/26/20  0217   WBC 6.48   RBC 3.74*   HGB 10.4*   HCT 28.8*      MCV 77*   MCH 27.8   MCHC 36.1*     CMP:   Recent Labs   Lab 11/26/20  0638 11/26/20  1013   *  268* 295*   CALCIUM 7.9*  7.9* 7.9*   ALBUMIN 3.2*  --    PROT 6.5  --      136 131*   K 2.9*  2.9* 3.6   CO2 37*  37* 33*   CL 88*  88* 89*   BUN 20  20 19   CREATININE 1.8*  1.8* 1.8*   ALKPHOS 78  --    ALT 23  --    AST 43*  --    BILITOT 1.0  --        Imp/Plan    ELIZABETH - improving  Hypomagnesemia/hypokalemia related to alcohol, improving  NSTEMI  HTN  DM type  2  Proteinuria  Alcoholism    Continue present Rx  CMP in am      Trac T Le  11/26/2020

## 2020-11-26 NOTE — NURSING
Patient refused potassium IV, says burns too much. Was already running with a saline flush for comfort. Lowered rate to 50ml/hr, still burns per patient. Notified Dr Conn for med order.

## 2020-11-26 NOTE — PLAN OF CARE
Problem: Adult Inpatient Plan of Care  Goal: Plan of Care Review  Flowsheets (Taken 11/26/2020 1736)  Plan of Care Reviewed With: patient     Problem: Fall Injury Risk  Goal: Absence of Fall and Fall-Related Injury  Intervention: Identify and Manage Contributors to Fall Injury Risk  Flowsheets (Taken 11/26/2020 1736)  Self-Care Promotion: independence encouraged  Medication Review/Management: medications reviewed  Intervention: Promote Injury-Free Environment  Flowsheets (Taken 11/26/2020 1736)  Safety Promotion/Fall Prevention:   assistive device/personal item within reach   bed alarm set   side rails raised x 2   medications reviewed   instructed to call staff for mobility   room near unit station  Environmental Safety Modification:   assistive device/personal items within reach   clutter free environment maintained   room near unit station     Problem: Fall Injury Risk  Goal: Absence of Fall and Fall-Related Injury  Intervention: Promote Injury-Free Environment  Flowsheets (Taken 11/26/2020 1736)  Safety Promotion/Fall Prevention:   assistive device/personal item within reach   bed alarm set   side rails raised x 2   medications reviewed   instructed to call staff for mobility   room near unit station  Environmental Safety Modification:   assistive device/personal items within reach   clutter free environment maintained   room near unit station     Problem: Diabetes Comorbidity  Goal: Blood Glucose Level Within Desired Range  Intervention: Maintain Glycemic Control  Flowsheets (Taken 11/26/2020 1736)  Glycemic Management:   blood glucose monitoring   supplemental insulin given     Problem: Adjustment to Illness (Sepsis/Septic Shock)  Goal: Optimal Coping  Intervention: Optimize Psychosocial Adjustment to Illness  Flowsheets (Taken 11/26/2020 1736)  Supportive Measures:   active listening utilized   verbalization of feelings encouraged  Family/Support System Care: support provided     Problem: Electrolyte  Imbalance (Acute Kidney Injury/Impairment)  Goal: Serum Electrolyte Balance  Intervention: Monitor and Manage Electrolyte Imbalance  Flowsheets (Taken 11/26/2020 1736)  Fluid/Electrolyte Management:   electrolyte supplement initiated   fluids adjusted     Problem: Renal Function Impairment (Acute Kidney Injury/Impairment)  Goal: Effective Renal Function  Intervention: Monitor and Support Renal Function  Flowsheets (Taken 11/26/2020 1736)  Medication Review/Management: medications reviewed

## 2020-11-26 NOTE — PROGRESS NOTES
Nuclear showed no ischemia. He has had recovery of his ejection fraction. Recommend medical management. Stress compliance. Restart home hyzaar as creatine improves.

## 2020-11-26 NOTE — PT/OT/SLP EVAL
Physical Therapy Evaluation    Patient Name:  Chato Bowie   MRN:  [id number]    Recommendations:     Discharge Recommendations:  home health PT   Discharge Equipment Recommendations: 3-in-1 commode, walker, rolling   Barriers to discharge: None    Assessment:     Chato Bowie is a 70 y.o. male admitted with a medical diagnosis of NSTEMI (non-ST elevated myocardial infarction).  He presents with the following impairments/functional limitations:  weakness, impaired endurance, impaired functional mobilty, gait instability, impaired balance, decreased lower extremity function, decreased safety awareness, pain, edema.    Rehab Prognosis: Good; patient would benefit from acute skilled PT services to address these deficits and reach maximum level of function.    Recent Surgery: * No surgery found *      Plan:     During this hospitalization, patient to be seen 5 x/week to address the identified rehab impairments via gait training, therapeutic activities, therapeutic exercises and progress toward the following goals:    · Plan of Care Expires:  12/09/20    Subjective     Chief Complaint: my leg is swollen  Patient/Family Comments/goals: to return to PLOF  Pain/Comfort:  · Pain Rating 1: 3/10  · Location - Side 1: Bilateral  · Location - Orientation 1: lower  · Location 1: back  · Pain Addressed 1: Reposition    Patients cultural, spiritual, Sikhism conflicts given the current situation:      Living Environment:  Pt lives alone in a 3rd floor apartment.  Pt reports using the elevation, but has to utilize stairs if electricity goes out.    Prior to admission, patients level of function was Independent.  Equipment used at home: none.  DME owned (not currently used): none.  Upon discharge, patient will have assistance from self.    Objective:     Communicated with Nurse Corado prior to session.  Patient found supine with peripheral IV, telemetry, SCD  upon PT entry to room.    General Precautions: Standard, fall    Orthopedic Precautions:Full weight bearing   Braces: N/A     Exams:  · Cognitive Exam:  Patient is oriented to Person, Place and Situation  · Gross Motor Coordination:  WFL  · Postural Exam:  Patient presented with the following abnormalities:    · -       Rounded shoulders  · -       Forward head  · -       Abnormal trunk flexion  · Skin Integrity/Edema:      · -       Skin integrity: Visible skin intact  · -       Edema: Mild L knee  · RLE ROM: WFL  · RLE Strength: WFL  · LLE ROM: WFL  · LLE Strength: WFL    Functional Mobility:  · Bed Mobility:     · Rolling Left:  contact guard assistance  · Rolling Right: contact guard assistance  · Supine to Sit: minimum assistance  · Sit to Supine: minimum assistance  · Transfers:     · Sit to Stand:  minimum assistance with rolling walker  · Gait: 100' with RW and Min A.  Pt requires VC's for sequencing, maintaining an upright posture, and increase step length to normalized functional mobility.     Therapeutic Activities and Exercises:   Lower Extremity Exercises.   Patient educated on the purpose of therapeutic exercise.     Patient verbalized acceptance/understanding of instructions, expectations, and limitations(for safety).   Patient performed: 2 sets of 10 reps (each) of B LE There Ex: AP, LAQ, Hip abd/add, Hip flexion while sitting up on EOB.        Patient required still requires verbal cues/tactile cues to ensure correct sequence, to maintain proper form, and to allow for self-correction.   Pt reported no complaints or problems with exercise activity.     AM-PAC 6 CLICK MOBILITY  Total Score:17     Patient left supine with all lines intact and call button in reach.    GOALS:   Multidisciplinary Problems     Physical Therapy Goals        Problem: Physical Therapy Goal    Goal Priority Disciplines Outcome Goal Variances Interventions   Physical Therapy Goal     PT, PT/OT Ongoing, Progressing     Description: Goals to be met by: 12/09/2020    Patient will increase  functional independence with mobility by performin. Supine to sit with Modified [place]  2. Sit to supine with Modified [place]  3. Sit to stand transfer with [place]  4. Gait  x 400 feet with Modified [place] using Rolling Walker.    Recommend:  HHPT, Rolling Walker, and 2-1 Commode at time of discharge.                      History:     Past Medical History:   Diagnosis Date    Acute congestive heart failure 3/20/2020    Alcohol abuse     Arthritis     Coronary artery disease     Diabetes mellitus     Hyperlipemia     Hypertension     Rhabdomyolysis        Past Surgical History:   Procedure Laterality Date    EYE SURGERY      VASCULAR SURGERY         Time Tracking:     PT Received On: 20  PT Start Time: 1705     PT Stop Time: 1727  PT Total Time (min): 22 min     Billable Minutes: Evaluation 8 min and Therapeutic Exercise 14 min      Mark Medina, PT  2020

## 2020-11-26 NOTE — PLAN OF CARE
Problem: Physical Therapy Goal  Goal: Physical Therapy Goal  Description: Goals to be met by: 2020    Patient will increase functional independence with mobility by performin. Supine to sit with Modified [place]  2. Sit to supine with Modified [place]  3. Sit to stand transfer with [place]  4. Gait  x 400 feet with Modified [place] using Rolling Walker.    Recommend:  HHPT, Rolling Walker, and 2-1 Commode at time of discharge.     Outcome: Ongoing, Progressing     Mark Medina, PT  2020

## 2020-11-26 NOTE — NURSING
Received report from JOYCE Curiel. Patient lying in bed resting, NAD noted. Safety precautions maintained, Will Monitor.

## 2020-11-26 NOTE — ASSESSMENT & PLAN NOTE
Patient with intermittent chest pain with many risk factories in last 3 days,has  Troponin level of 0.063,EKG with non specific T wave changes,cardiology was called ,was planing taking to cath lab,but he has potassium level of less than 2 and magnesioum of 1.4 cardiology can not take patient to cath lab at this time,electrolytes is replaced IV and PO.,he has been also  already on heparin drip.NPO past MN.cardiology is consulted.  Troponin  level remains flat,chest pain is improved.SNT show no acute ischemia,EF is preserved .

## 2020-11-27 VITALS
BODY MASS INDEX: 30.15 KG/M2 | RESPIRATION RATE: 16 BRPM | HEART RATE: 85 BPM | DIASTOLIC BLOOD PRESSURE: 85 MMHG | OXYGEN SATURATION: 99 % | TEMPERATURE: 98 F | WEIGHT: 215.38 LBS | SYSTOLIC BLOOD PRESSURE: 152 MMHG | HEIGHT: 71 IN

## 2020-11-27 LAB
ANION GAP SERPL CALC-SCNC: 12 MMOL/L (ref 8–16)
BASOPHILS # BLD AUTO: 0.08 K/UL (ref 0–0.2)
BASOPHILS NFR BLD: 1.1 % (ref 0–1.9)
BUN SERPL-MCNC: 16 MG/DL (ref 8–23)
CALCIUM SERPL-MCNC: 8.1 MG/DL (ref 8.7–10.5)
CHLORIDE SERPL-SCNC: 93 MMOL/L (ref 95–110)
CO2 SERPL-SCNC: 33 MMOL/L (ref 23–29)
CREAT SERPL-MCNC: 1.6 MG/DL (ref 0.5–1.4)
DIFFERENTIAL METHOD: ABNORMAL
EOSINOPHIL # BLD AUTO: 0.2 K/UL (ref 0–0.5)
EOSINOPHIL NFR BLD: 2.7 % (ref 0–8)
ERYTHROCYTE [DISTWIDTH] IN BLOOD BY AUTOMATED COUNT: 12.2 % (ref 11.5–14.5)
EST. GFR  (AFRICAN AMERICAN): 50 ML/MIN/1.73 M^2
EST. GFR  (NON AFRICAN AMERICAN): 43 ML/MIN/1.73 M^2
GLUCOSE SERPL-MCNC: 241 MG/DL (ref 70–110)
HCT VFR BLD AUTO: 30.9 % (ref 40–54)
HGB BLD-MCNC: 11 G/DL (ref 14–18)
IMM GRANULOCYTES # BLD AUTO: 0.08 K/UL (ref 0–0.04)
IMM GRANULOCYTES NFR BLD AUTO: 1.1 % (ref 0–0.5)
LYMPHOCYTES # BLD AUTO: 1.1 K/UL (ref 1–4.8)
LYMPHOCYTES NFR BLD: 15.2 % (ref 18–48)
MAGNESIUM SERPL-MCNC: 1.8 MG/DL (ref 1.6–2.6)
MCH RBC QN AUTO: 27.9 PG (ref 27–31)
MCHC RBC AUTO-ENTMCNC: 35.6 G/DL (ref 32–36)
MCV RBC AUTO: 78 FL (ref 82–98)
MONOCYTES # BLD AUTO: 0.9 K/UL (ref 0.3–1)
MONOCYTES NFR BLD: 12.4 % (ref 4–15)
NEUTROPHILS # BLD AUTO: 5.1 K/UL (ref 1.8–7.7)
NEUTROPHILS NFR BLD: 67.5 % (ref 38–73)
NRBC BLD-RTO: 0 /100 WBC
PLATELET # BLD AUTO: 196 K/UL (ref 150–350)
PMV BLD AUTO: 9.4 FL (ref 9.2–12.9)
POCT GLUCOSE: 226 MG/DL (ref 70–110)
POCT GLUCOSE: 252 MG/DL (ref 70–110)
POCT GLUCOSE: 297 MG/DL (ref 70–110)
POTASSIUM SERPL-SCNC: 3.1 MMOL/L (ref 3.5–5.1)
RBC # BLD AUTO: 3.94 M/UL (ref 4.6–6.2)
SODIUM SERPL-SCNC: 138 MMOL/L (ref 136–145)
WBC # BLD AUTO: 7.48 K/UL (ref 3.9–12.7)

## 2020-11-27 PROCEDURE — 80048 BASIC METABOLIC PNL TOTAL CA: CPT

## 2020-11-27 PROCEDURE — 25000003 PHARM REV CODE 250: Performed by: HOSPITALIST

## 2020-11-27 PROCEDURE — 36415 COLL VENOUS BLD VENIPUNCTURE: CPT

## 2020-11-27 PROCEDURE — 83735 ASSAY OF MAGNESIUM: CPT

## 2020-11-27 PROCEDURE — 97530 THERAPEUTIC ACTIVITIES: CPT | Performed by: PHYSICAL THERAPIST

## 2020-11-27 PROCEDURE — 97165 OT EVAL LOW COMPLEX 30 MIN: CPT

## 2020-11-27 PROCEDURE — 97110 THERAPEUTIC EXERCISES: CPT | Performed by: PHYSICAL THERAPIST

## 2020-11-27 PROCEDURE — 85025 COMPLETE CBC W/AUTO DIFF WBC: CPT

## 2020-11-27 PROCEDURE — 94761 N-INVAS EAR/PLS OXIMETRY MLT: CPT

## 2020-11-27 PROCEDURE — 97116 GAIT TRAINING THERAPY: CPT | Performed by: PHYSICAL THERAPIST

## 2020-11-27 PROCEDURE — 63600175 PHARM REV CODE 636 W HCPCS: Performed by: HOSPITALIST

## 2020-11-27 RX ORDER — POTASSIUM CHLORIDE 20 MEQ/1
60 TABLET, EXTENDED RELEASE ORAL ONCE
Status: COMPLETED | OUTPATIENT
Start: 2020-11-27 | End: 2020-11-27

## 2020-11-27 RX ORDER — INSULIN ASPART 100 [IU]/ML
10 INJECTION, SUSPENSION SUBCUTANEOUS
Status: ON HOLD
Start: 2020-11-27 | End: 2020-12-11 | Stop reason: HOSPADM

## 2020-11-27 RX ORDER — FUROSEMIDE 40 MG/1
40 TABLET ORAL DAILY
Qty: 30 TABLET | Refills: 0 | Status: ON HOLD
Start: 2020-11-27 | End: 2020-12-10 | Stop reason: HOSPADM

## 2020-11-27 RX ORDER — HYDRALAZINE HYDROCHLORIDE 50 MG/1
50 TABLET, FILM COATED ORAL 3 TIMES DAILY
Qty: 90 TABLET | Refills: 0 | Status: ON HOLD | OUTPATIENT
Start: 2020-11-27 | End: 2020-12-11 | Stop reason: HOSPADM

## 2020-11-27 RX ORDER — LANOLIN ALCOHOL/MO/W.PET/CERES
100 CREAM (GRAM) TOPICAL DAILY
Status: ON HOLD
Start: 2020-11-28 | End: 2020-12-10 | Stop reason: HOSPADM

## 2020-11-27 RX ADMIN — THERA TABS 1 TABLET: TAB at 08:11

## 2020-11-27 RX ADMIN — Medication 100 MG: at 08:11

## 2020-11-27 RX ADMIN — SODIUM CHLORIDE AND POTASSIUM CHLORIDE: 9; 1.49 INJECTION, SOLUTION INTRAVENOUS at 05:11

## 2020-11-27 RX ADMIN — FAMOTIDINE 20 MG: 10 INJECTION INTRAVENOUS at 08:11

## 2020-11-27 RX ADMIN — AMLODIPINE BESYLATE 10 MG: 5 TABLET ORAL at 08:11

## 2020-11-27 RX ADMIN — POTASSIUM CHLORIDE 60 MEQ: 1500 TABLET, EXTENDED RELEASE ORAL at 12:11

## 2020-11-27 RX ADMIN — FOLIC ACID 1 MG: 1 TABLET ORAL at 08:11

## 2020-11-27 RX ADMIN — TAMSULOSIN HYDROCHLORIDE 0.4 MG: 0.4 CAPSULE ORAL at 08:11

## 2020-11-27 RX ADMIN — METOPROLOL SUCCINATE 25 MG: 25 TABLET, EXTENDED RELEASE ORAL at 08:11

## 2020-11-27 RX ADMIN — DIAZEPAM 5 MG: 5 TABLET ORAL at 05:11

## 2020-11-27 RX ADMIN — INSULIN DETEMIR 20 UNITS: 100 INJECTION, SOLUTION SUBCUTANEOUS at 08:11

## 2020-11-27 RX ADMIN — HEPARIN SODIUM 5000 UNITS: 5000 INJECTION INTRAVENOUS; SUBCUTANEOUS at 05:11

## 2020-11-27 RX ADMIN — INSULIN ASPART 2 UNITS: 100 INJECTION, SOLUTION INTRAVENOUS; SUBCUTANEOUS at 08:11

## 2020-11-27 RX ADMIN — ASPIRIN 325 MG: 325 TABLET, COATED ORAL at 08:11

## 2020-11-27 RX ADMIN — POLYETHYLENE GLYCOL 3350 17 G: 17 POWDER, FOR SOLUTION ORAL at 08:11

## 2020-11-27 NOTE — PT/OT/SLP DISCHARGE
Occupational Therapy Discharge Summary    Chato Bowie  MRN: [id number]   Principal Problem: NSTEMI (non-ST elevated myocardial infarction)      Patient Discharged from acute Occupational Therapy on 11/27/20.  Please refer to prior OT notes for functional status.    Assessment:      Patient appropriate for care in another setting.    Objective:     GOALS:   Multidisciplinary Problems     Occupational Therapy Goals        Problem: Occupational Therapy Goal    Goal Priority Disciplines Outcome Interventions   Occupational Therapy Goal     OT, PT/OT Ongoing, Progressing    Description: Goals to be met by: 12/04/20     Patient will increase functional independence with ADLs by performing:    LE Dressing with Modified [place].  Grooming while standing at sink with Modified [place].  Toileting from toilet with Modified [place] for hygiene and clothing management.   Toilet transfer to toilet with Modified [place].  Upper extremity exercise program x15 reps per handout, with independence.                     Reasons for Discontinuation of Therapy Services  Transfer to alternate level of care.      Plan:     Patient Discharged to: Home with Home Health Service    Ewa Ji OT  11/27/2020

## 2020-11-27 NOTE — PLAN OF CARE
Important Message from Medicare and discharge appeal process reviewed with patient; patient was given opportunity to ask questions; after which, verbalized understanding of rights; copy was placed in medical record chart and one copy given to his for his review and records       11/27/20 0830   Medicare Message   Important Message from Medicare regarding Discharge Appeal Rights Given to patient/caregiver;Explained to patient/caregiver;Signed/date by patient/caregiver   Date IMM was signed 11/27/20   Time IMM was signed 0830

## 2020-11-27 NOTE — NURSING
Received report from JOYCE Obrien. Patient lying in bed resting, NAD noted. Safety precautions maintained.

## 2020-11-27 NOTE — PLAN OF CARE
Problem: Occupational Therapy Goal  Goal: Occupational Therapy Goal  Description: Goals to be met by: 12/04/20     Patient will increase functional independence with ADLs by performing:    LE Dressing with Modified [place].  Grooming while standing at sink with Modified [place].  Toileting from toilet with Modified [place] for hygiene and clothing management.   Toilet transfer to toilet with Modified [place].  Upper extremity exercise program x15 reps per handout, with independence.    Outcome: Ongoing, Progressing     SBA/SUP for in-room functional mobility using RW and ADLs. OT rec HHOT for home safety/set-up in order to increase safety with ADLs/all aspects of functional mobility and reduce risk of falls.

## 2020-11-27 NOTE — PLAN OF CARE
[facility]    HOME HEALTH ORDERS  FACE TO FACE ENCOUNTER    Patient Name: Chato Bowie  YOB: 1950    PCP: Irlanda Valenzuela   PCP Address: [address]  PCP Phone Number: [phone]  PCP Fax: [phone]    Encounter Date: 11/27/2020    Admit to Home Health    Diagnoses:  Active Hospital Problems    Diagnosis  POA    *NSTEMI (non-ST elevated myocardial infarction) [I21.4]  Yes     Priority: 1 - High    Hypokalemia [E87.6]  Yes     Priority: 2     Hypomagnesemia [E83.42]  Yes     Priority: 3     Acute renal failure superimposed on stage 3 chronic kidney disease [N17.9, N18.30]  Yes    CKD (chronic kidney disease), stage III [N18.30]  Yes    Chronic combined systolic and diastolic congestive heart failure [I50.42]  Yes    BPH (benign prostatic hyperplasia) [N40.0]  Yes    Aortic stenosis, moderate [I35.0]  Yes    Type 2 diabetes mellitus with hyperglycemia [E11.65]  Yes    Alcohol abuse [F10.10]  Yes    Anemia of chronic disease [D63.8]  Yes     Chronic    Essential hypertension [I10]  Yes     Chronic    Hyperlipidemia [E78.5]  Yes     Chronic    CAD (coronary artery disease) [I25.10]  Yes     Chronic      Resolved Hospital Problems   No resolved problems to display.       No future appointments.  Follow-up Information     [facility] In 1 week.    Contact information:  [address]  [phone]                     I have seen and examined this patient face to face today. My clinical findings that support the need for the home health skilled services and home bound status are the following:  Weakness/numbness causing balance and gait disturbance due to Weakness/Debility making it taxing to leave home.    Allergies:Review of patient's allergies indicates:  No Known Allergies    Diet: diabetic diet: 2000 calorie and renal diet    Activities: activity as tolerated    Nursing:   SN to complete comprehensive  assessment including routine vital signs. Instruct on disease process and s/s of complications to report to MD. Review/verify medication list sent home with the patient at time of discharge  and instruct patient/caregiver as needed. Frequency may be adjusted depending on start of care date.    Notify MD if SBP > 160 or < 90; DBP > 90 or < 50; HR > 120 or < 50; Temp > 101; Other:         CONSULTS:    Physical Therapy to evaluate and treat. Evaluate for home safety and equipment needs; Establish/upgrade home exercise program. Perform / instruct on therapeutic exercises, gait training, transfer training, and Range of Motion.  Occupational Therapy to evaluate and treat. Evaluate home environment for safety and equipment needs. Perform/Instruct on transfers, ADL training, ROM, and therapeutic exercises.    MISCELLANEOUS CARE:  Routine Skin for Bedridden Patients: Instruct patient/caregiver to apply moisture barrier cream to all skin folds and wet areas in perineal area daily and after baths and all bowel movements.    WOUND CARE ORDERS  n/a      Medications: Review discharge medications with patient and family and provide education.      Current Discharge Medication List      START taking these medications    Details   hydrALAZINE (APRESOLINE) 50 MG tablet Take 1 tablet (50 mg total) by mouth 3 (three) times daily.  Qty: 90 tablet, Refills: 0    Comments: .      multivitamin Tab Take 1 tablet by mouth once daily.  Qty:        thiamine 100 MG tablet Take 1 tablet (100 mg total) by mouth once daily.  Qty:           CONTINUE these medications which have CHANGED    Details   furosemide (LASIX) 40 MG tablet Take 1 tablet (40 mg total) by mouth once daily.  Qty: 30 tablet, Refills: 0      NOVOLOG MIX 70-30 U-100 INSULN 100 unit/mL (70-30) Soln Inject 10 Units into the skin before meals as needed.         CONTINUE these medications which have NOT CHANGED    Details   amlodipine (NORVASC) 10 MG tablet Take 10 mg by mouth once  daily.      aspirin (ECOTRIN) 81 MG EC tablet Take 1 tablet (81 mg total) by mouth once daily.  Refills: 0      aspirin-calcium carbonate 81 mg-300 mg calcium(777 mg) Tab Take 81 mg by mouth.      atorvastatin (LIPITOR) 80 MG tablet Take 80 mg by mouth every evening.      atropine 1% (ISOPTO ATROPINE) 1 % Drop INT 1 GTT INTO OD QD FOR 1 WK      carvedilol (COREG) 12.5 MG tablet Take 1 tablet (12.5 mg total) by mouth 2 (two) times daily.  Qty: 60 tablet, Refills: 0      colchicine (COLCRYS) 0.6 mg tablet Take 1 tablet (0.6 mg total) by mouth once daily. for 14 days  Qty: 14 tablet, Refills: 0      DUREZOL 0.05 % Drop ophthalmic solution INT 1 GTT INTO OD FID FOR 3 WEEKS      fenofibrate 160 MG Tab Take 160 mg by mouth.      folic acid (FOLVITE) 1 MG tablet Take 1 mg by mouth.      insulin syringe-needle U-100 1 mL 31 gauge x 5/16 Syrg USE TO INJECT INSULIN TWICE A DAY      lansoprazole (PREVACID) 30 MG capsule Take 30 mg by mouth once daily.      magnesium oxide (MAG-OX) 400 mg (241.3 mg magnesium) tablet Take 800 mg by mouth.      pantoprazole (PROTONIX) 40 MG tablet Take 1 tablet (40 mg total) by mouth 2 (two) times daily.  Qty: 60 tablet, Refills: 1      rosuvastatin (CRESTOR) 40 MG Tab Take 10 mg by mouth once daily.      sildenafil (VIAGRA) 100 MG tablet Take 1 tablet (100 mg total) by mouth daily as needed for Erectile Dysfunction. *generic tabs*  Qty: 30 tablet, Refills: 11      tamsulosin (FLOMAX) 0.4 mg Cap Take 1 capsule (0.4 mg total) by mouth once daily.  Qty: 30 capsule, Refills: 11      terbinafine HCl (LAMISIL) 250 mg tablet          STOP taking these medications       losartan-hydrochlorothiazide 100-25 mg (HYZAAR) 100-25 mg per tablet Comments:   Reason for Stopping:         metformin (GLUCOPHAGE) 1000 MG tablet Comments:   Reason for Stopping:               I certify that this patient is confined to his home and needs intermittent skilled nursing care, physical therapy and occupational  therapy.

## 2020-11-27 NOTE — PT/OT/SLP EVAL
Occupational Therapy   Evaluation    Name: Chato Bowie  MRN: [id number]  Admitting Diagnosis:  NSTEMI (non-ST elevated myocardial infarction)      Recommendations:     Discharge Recommendations: home health OT  Discharge Equipment Recommendations:  walker, rolling  Barriers to discharge:  None    Assessment:     Chato Bowie is a 70 y.o. male with a medical diagnosis of NSTEMI (non-ST elevated myocardial infarction). Performance deficits affecting function: weakness, impaired endurance, impaired balance, impaired functional mobilty, impaired self care skills.      SBA/SUP for in-room functional mobility using RW and ADLs. OT rec HHOT for home safety/set-up in order to increase safety with ADLs/all aspects of functional mobility and reduce risk of falls    Rehab Prognosis: Good; patient would benefit from acute skilled OT services to address these deficits and reach maximum level of function.       Plan:     Patient to be seen 3 x/week to address the above listed problems via self-care/home management, therapeutic activities, therapeutic exercises  · Plan of Care Expires: 12/11/20  · Plan of Care Reviewed with: patient    Subjective     Chief Complaint: ready to go home and take a shower   Patient/Family Comments/goals: pt pleasantly and appreciative; happy to sit up in the chair     Occupational Profile:  Living Environment: Pt lives alone on the 3rd floor apartment with elevator access. Bathroom set-up: tub/shower combo.   Previous level of function: independent within the apartment with ADLs/functional mobility; pt uses str c in the community   Roles and Routines: likes to cook; uses public transportation   Equipment Used at Home:  cane, straight  Assistance upon Discharge: sister nearby     Pain/Comfort:  · Pain Rating 1: 0/10    Patients cultural, spiritual, Latter day conflicts given the current situation: no    Objective:     Communicated with: nurseMickie, prior to session.  Patient found HOB elevated  with peripheral IV, telemetry upon OT entry to room.    General Precautions: Standard, fall, diabetic   Orthopedic Precautions:N/A   Braces: N/A     Occupational Performance:    Bed Mobility:    · Patient completed Scooting/Bridging with modified independence  · Patient completed Supine to Sit with modified independence    Functional Mobility/Transfers:  · Patient completed Sit <> Stand Transfer with supervision  with  rolling walker   · Patient completed Bed <> Chair Transfer using Step Transfer technique with supervision with rolling walker  · Patient completed Toilet Transfer Step Transfer technique with supervision with  rolling walker  · Functional Mobility: Pt completed in-room and bathroom functional mobility with RW and supervision.    Activities of Daily Living:  · Upper Body Dressing: modified independence with donning gown with back gown  · Lower Body Dressing: supervision to doff/don R sock seated EOB; pt became SOB after this; spO2 98%, 90 bpm  · Pt already completed grooming with nursing assist      Cognitive/Visual Perceptual:  Cognitive/Psychosocial Skills:     -       Oriented to: Person, Place and Time   -       Follows Commands/attention:Follows multistep  commands  -       Communication: clear/fluent  -       Memory: No Deficits noted  -       Safety awareness/insight to disability: intact   -       Mood/Affect/Coping skills/emotional control: Cooperative and Pleasant  Visual/Perceptual:      -Intact  R/L discrimination      Physical Exam:  Balance:    -       seated: MOD I; standing: SUP with RW  Postural examination/scapula alignment:    -       Rounded shoulders  Skin integrity: Visible skin intact  Edema:  no BUE edema noted  Sensation:    -       Intact  light/touch BUE  Dominant hand:    -       Right  Upper Extremity Range of Motion:     -       Right Upper Extremity: WFL  -       Left Upper Extremity: WFL  Upper Extremity Strength:    -       Right Upper Extremity: WFL  -       Left Upper  Extremity: WFL   Strength:    -       Right Upper Extremity: WFL  -       Left Upper Extremity: WFL  Fine Motor Coordination:    -       Intact  Left hand, manipulation of objects and Right hand, manipulation of objects  Gross motor coordination:   WFL    AMPAC 6 Click ADL:  AMPAC Total Score: 24    Treatment & Education:  · Pt educated on OT role/POC.   · Importance of OOB activity with staff assistance  · Encouraged pt to sit up in the chair throughout the day, especially for meals  · Safety during functional t/f and mobility   · White board updated: level 3 progressive mobility   · Multiple self-care tasks/functional mobility completed- assistance level noted above   · All questions/concerns answered within OT scope of practice     Education:    Patient left up in chair with all lines intact, call button in reach, nurse, Mickie, aware and all needs within reach    GOALS:   Multidisciplinary Problems     Occupational Therapy Goals        Problem: Occupational Therapy Goal    Goal Priority Disciplines Outcome Interventions   Occupational Therapy Goal     OT, PT/OT Ongoing, Progressing    Description: Goals to be met by: 12/04/20     Patient will increase functional independence with ADLs by performing:    LE Dressing with Modified [place].  Grooming while standing at sink with Modified [place].  Toileting from toilet with Modified [place] for hygiene and clothing management.   Toilet transfer to toilet with Modified [place].  Upper extremity exercise program x15 reps per handout, with independence.                     History:     Past Medical History:   Diagnosis Date    Acute congestive heart failure 3/20/2020    Alcohol abuse     Arthritis     Coronary artery disease     Diabetes mellitus     Hyperlipemia     Hypertension     Rhabdomyolysis        Past Surgical History:   Procedure Laterality Date    EYE SURGERY      VASCULAR SURGERY         Time Tracking:     OT Date of  Treatment: 11/27/20  OT Start Time: 1035  OT Stop Time: 1047  OT Total Time (min): 12 min    Billable Minutes:Evaluation 12 min    Ewa Ji OT  11/27/2020

## 2020-11-27 NOTE — PT/OT/SLP PROGRESS
Physical Therapy Treatment    Patient Name:  Chato Bowie   MRN:  [id number]    Recommendations:     Discharge Recommendations:  home health PT   Discharge Equipment Recommendations: 3-in-1 commode, walker, rolling   Barriers to discharge: None    Assessment:     Chato Bowie is a 70 y.o. male admitted with a medical diagnosis of NSTEMI (non-ST elevated myocardial infarction).  He presents with the following impairments/functional limitations:  impaired endurance, gait instability, impaired balance, decreased lower extremity function, decreased safety awareness.    Rehab Prognosis: Good; patient would benefit from acute skilled PT services to address these deficits and reach maximum level of function.    Recent Surgery: * No surgery found *      Plan:     During this hospitalization, patient to be seen 5 x/week to address the identified rehab impairments via gait training, therapeutic activities, therapeutic exercises and progress toward the following goals:    · Plan of Care Expires:  12/09/20    Subjective     Chief Complaint: none stated  Patient/Family Comments/goals: to return to PLOF  Pain/Comfort:  · Pain Rating 1: 0/10      Objective:     Communicated with Nurse Corado prior to session.  Patient found supine with peripheral IV, telemetry upon PT entry to room.     General Precautions: Standard, fall   Orthopedic Precautions:Full weight bearing   Braces: (- personal L knee compression sleeve)     Functional Mobility:  · Bed Mobility:     · Supine to Sit: stand by assistance  · Sit to Supine: stand by assistance  · Transfers:     · Sit to Stand:  contact guard assistance with rolling walker  · Gait: 400' with RW and CGA.      Therapeutic Activities and Exercises:   Lower Extremity Exercises.   Patient educated on the purpose of therapeutic exercise.     Patient verbalized acceptance/understanding of instructions, expectations, and limitations(for safety).   Patient performed: 2 sets of 10 reps (each) of B  "LE There Ex: AP, LAQ, Hip abd/add, Hip flexion while sitting up on EOB.        Patient required still requires verbal cues/tactile cues to ensure correct sequence, to maintain proper form, and to allow for self-correction.   Pt reported no complaints or problems with exercise activity.     Patient Assistance for Bathroom Usage.   The bathroom environment was organized for patient usage (light is on/ is up/toilet seat is down/trash can is out way/locate toilet paper dispenser).  Patient ambulated from EOB to toilet with RW and CGA.  Patient required VC's/TC's for sequencing, positioning, and initiation for safety with toilet transfer.  · VC's to "Step back until you can feel the toilet with the back of your legs to ensure proximity to toilet.    Patient required CGA to transfer on/off toilet with initial hesitation with task.   · VC's to Reach back for the grab bar or toilet seat with UE to provide support and alignment with toilet.  · VC's to Lean Forward while allowing buttocks to "stick out" while lowering to toilet seat to prevent any (sitting) LOB.    Patient educated to ALWAYS notify someone (NURSE/CNA) before going to the Bathroom to make sure path is blocked or still hooked up to lines.      Patient left supine with all lines intact, call button in reach and Nurse Mickie notified..    GOALS:   Multidisciplinary Problems     Physical Therapy Goals        Problem: Physical Therapy Goal    Goal Priority Disciplines Outcome Goal Variances Interventions   Physical Therapy Goal     PT, PT/OT Ongoing, Progressing     Description: Goals to be met by: 2020    Patient will increase functional independence with mobility by performin. Supine to sit with Modified [place]  2. Sit to supine with Modified [place]  3. Sit to stand transfer with [place]  4. Gait  x 400 feet with Modified [place] using Rolling Walker.    Recommend:  HHPT, Rolling Walker, and 2-1 Commode at time of " discharge.                      Time Tracking:     PT Received On: 11/27/20  PT Start Time: 0940     PT Stop Time: 1020  PT Total Time (min): 40 min     Billable Minutes: Gait Training 15 min, Therapeutic Activity 12 min and Therapeutic Exercise 13 min    Treatment Type: Treatment  PT/PTA: PT     PTA Visit Number: 0     Mark Medina, PT  11/27/2020

## 2020-11-27 NOTE — DISCHARGE INSTRUCTIONS
WRITTEN HEALTHCARE DISCHARGE INFORMATION      Things that YOU are responsible for to Manage Your Care At Home:  1. Getting your prescriptions filled.  2. Taking you medications as directed. DO NOT MISS ANY DOSES!  3. Going to your follow-up doctor appointments. This is important because it allows the doctor to monitor your progress and to determine if any changes need to be made to your treatment plan.     If you are unable to make your follow up appointments, please call the number listed and reschedule this appointment.      After discharge, if you need assistance, you can call [facility] On Call Nurse Care Line for 24/7 assistance at [phone]     If you are experience any signs or symptoms, Call your doctor or Call [phone] and come to your nearest Emergency Room.     Thank you for choosing [facility] and allowing us to care for you.        You should receive a call from [facility] Discharge Department within 48-72 hours to help manage your care after discharge. Please try to make sure that you answer your phone for this important phone call.

## 2020-11-27 NOTE — PLAN OF CARE
Problem: Physical Therapy Goal  Goal: Physical Therapy Goal  Description: Goals to be met by: 2020    Patient will increase functional independence with mobility by performin. Supine to sit with Modified [place]  2. Sit to supine with Modified [place]  3. Sit to stand transfer with [place]  4. Gait  x 400 feet with Modified [place] using Rolling Walker.    Recommend:  HHPT, Rolling Walker, and 2-1 Commode at time of discharge.     Outcome: Ongoing, Progressing     Mark Medina, PT  2020

## 2020-11-27 NOTE — PLAN OF CARE
EDUCATION:  Patient discharge instructions updated to include educational information on Heart Attacks that will be printed on patient's AVS to review upon discharge; Information reviewed with patient and include  signs and symptoms to look for and call the doctor if experiencing, and symptoms that may indicate a medical emergency: CALL 911.    All questions answered.  Teach back method used.   Repeated will call 911 for chest pains and SOB    Reviewed with patient things that he can do  to better manage his care at home to include taking all medications as precscribed and make sure patient attend all follow up visits;     F/U appointments with PCP were reviewed;  verbalized understanding     NurseMickie notified that all discharge planning needs have been addressed and patient can be discharged from CM standpoint     Patient stated that he needed assistance with transportation; TN provided; nurse made aware    ADT 30 order placed for Van Transportation.      Requested  time: 1330      If transportation does not arrive at ETA time nurse will be instructed to follow protocol for transportation below:  How can I get in touch directly with dispatch, if needed?                 Non-emergent (Van): [phone]        ++NURSING:  If Van does not arrive at requested time please call the above Non Emergent Dispatcher.  If issue not resolved please escalate to your charge nurse for further instructions.           11/27/20 1202   Final Note   Assessment Type Final Discharge Note   Anticipated Discharge Disposition Home-Health   What phone number can be called within the next 1-3 days to see how you are doing after discharge? [phone]   Hospital Follow Up  Appt(s) scheduled? Yes   Discharge plans and expectations educations in teach back method with documentation complete? Yes   Right Care Referral Info   Post Acute Recommendation Home-care   Referral Type Home Health   Facility Name [facility]   Post-Acute  Status   Post-Acute Authorization HME;Home Health;Other   HME Status Patient/Family Changed Mind  (RW/BSC delivered to room)   Home Health Status Set-up Complete  (Family [facility] - per Irlanda)   Other Status Awaiting f/u Appts  (per Irlanda, virtual appt scheduled 12/1 @ 3:30; pt made aware)   Discharge Delays None known at this time

## 2020-11-27 NOTE — PLAN OF CARE
WRITTEN HEALTHCARE DISCHARGE INFORMATION      Things that YOU are responsible for to Manage Your Care At Home:  1. Getting your prescriptions filled.  2. Taking you medications as directed. DO NOT MISS ANY DOSES!  3. Going to your follow-up doctor appointments. This is important because it allows the doctor to monitor your progress and to determine if any changes need to be made to your treatment plan.     If you are unable to make your follow up appointments, please call the number listed and reschedule this appointment.      After discharge, if you need assistance, you can call [facility] Line for 24/7 assistance at [phone]     If you are experience any signs or symptoms, Call your doctor or Call 911 and come to your nearest Emergency Room.     Thank you for choosing [facility] and allowing us to care for you.        You should receive a call from [facility] Discharge Department within 48-72 hours to help manage your care after discharge. Please try to make sure that you answer your phone for this important phone call.     Follow-up Information     Irlanda Valenzuela On 12/1/2020.    Why: Video appointment scheduled 12/1/20 @ 3:30  Contact information:  [address]  [place] [address]  [phone]             [facility].    Specialties: Home Health Services, Physical Therapy, Occupational Therapy  Why: Please call for any Home Health related issues  Contact information:  [address] [address]  [phone]             [facility]    Specialty: DME Provider  Why: Please call for any equipment related issues  Contact information:  [address]  [facility]  [facility] [id number] [phone]

## 2020-11-27 NOTE — DISCHARGE SUMMARY
[facility] Hospital Medicine  Discharge Summary      Patient Name: Chato Bowie  MRN: [id number]  Admission Date: 11/24/2020  Hospital Length of Stay: 3 days  Discharge Date and Time:  11/27/2020 10:07 AM  Attending Physician: Raoul Morse MD   Discharging Provider: Raoul Morse MD  Primary Care Provider: [facility]      HPI:   69-year-old male with a PMHx of CAD, CHF with EF of 50%,alcohol abuse,CKD 3,, DM, and HTN presents to the ED, per EMS, complaining of intermittent chest pain x 3 days. He describes his pain as pressure. CP radiates to his neck. CP exacerbating with deep respiration. Reports associated SOB with CP. Reports emesis with CP yesterday. Today, pt states he felt light-headed while shopping prior to onset of CP around 2 pm. States he was standing at a bus stop when his pain began. States CP lasted about 1 hour. Reports of chest pain at this time. States EMS gave ASA en route. Pt states he does not take NTG or ASA at home.in ER has Troponin level of 0.063,EKG wit non specific T wave changes,cardiology was called ,was planing taking to cath lab,but he has potassium level of less than 2 and magnesioum of 1.4 cardiology can not take patient to cath lab at this time,electrolytes is replaced IV and PO.he has been also started on IVF for ARF on CKD 3,he is hemodynamically stable and he is on RA,he is already on heparin drip.his last alcohol consumption was yesterday,drink 2 pint of whisky.    * No surgery found *      Hospital Course:   69-year-old male with a PMHx of CAD, CHF with EF of 50%,alcohol abuse,CKD 3,, DM, and HTN presents to the ED, per EMS, complaining of intermittent chest pain x 3 days. He describes his pain as pressure. CP radiates to his neck. CP exacerbating with deep respiration. Reports associated SOB with CP. Reports emesis with CP , pt states he felt light-headed while shopping prior to onset of CP around 2 pm. States he was standing at a  bus stop when his pain began. States CP lasted about 1 hour.  States EMS gave ASA en route. Pt states he does not take NTG or ASA at home.in ER has Troponin level of 0.063,EKG wit non specific T wave changes,cardiology was called ,was planing taking to cath lab,but he had  potassium level of less than 2 and magnesioum of 1.4 cardiology could not take patient to cath lab ,electrolytes is replaced IV and PO.he has been also started on IVF for ARF on CKD 3,he was hemodynamically stable and he was on RA,he was started  already on heparin drip to ICU for close monitoring..his last alcohol consumption was day before admission,,drink 2 pint of whisky every orgher day.  Hypokalemia and Hypomagnesemia is gradually improved,continued replace and monitor closely.  CRT with IVF  is improved,was back to baseline, nephrology was following,  Troponin  level remains flat,chest pain is resolved,.SNT show no acute ischemia,  Patient was on DT precaution with schedulled valium and vitamins,gradully wean down.  Did well with PT,OT and HH at [place] time is arranged.  Patient was consulted multiple times quit alcohol consumption,patient was discharged home with HH and  DME and follow up with PCP in next few days.     Consults:   Consults (From admission, onward)        Status Ordering Provider     Inpatient consult to Cardiology  Once     Provider:  Joshua Llamas MD    Completed LATRICE STALEY     Inpatient consult to Nephrology  Once     Provider:  Ilene Tirado MD    Completed RABIA SALAZAR          No new Assessment & Plan notes have been filed under this hospital service since the last note was generated.  Service: Hospital Medicine    Final Active Diagnoses:    Diagnosis Date Noted POA    PRINCIPAL PROBLEM:  NSTEMI (non-ST elevated myocardial infarction) [I21.4] 11/24/2020 Yes    Hypokalemia [E87.6] 02/14/2019 Yes    Hypomagnesemia [E83.42] 11/24/2020 Yes    Acute renal failure superimposed on stage 3 chronic kidney  "disease [N17.9, N18.30] 11/24/2020 Yes    CKD (chronic kidney disease), stage III [N18.30] 11/24/2020 Yes    Chronic combined systolic and diastolic congestive heart failure [I50.42] 11/24/2020 Yes    BPH (benign prostatic hyperplasia) [N40.0] 11/24/2020 Yes    Aortic stenosis, moderate [I35.0] 11/24/2020 Yes    Type 2 diabetes mellitus with hyperglycemia [E11.65] 11/24/2020 Yes    Alcohol abuse [F10.10] 02/14/2019 Yes    Anemia of chronic disease [D63.8] 03/10/2017 Yes     Chronic    Essential hypertension [I10] 03/10/2017 Yes     Chronic    Hyperlipidemia [E78.5] 03/10/2017 Yes     Chronic    CAD (coronary artery disease) [I25.10] 03/08/2016 Yes     Chronic      Problems Resolved During this Admission:       Discharged Condition: stable    Disposition: Home-Health Care [facility]    Follow Up:  Follow-up Information     [facility]    Contact information:  [address] [place] [address]  Luz MERCEDES [id number] [phone]                 Patient Instructions:      WALKER FOR HOME USE     Order Specific Question Answer Comments   Type of Walker: Adult (5'4"-6'6")    With wheels? Yes    Height: 5' 11" (1.803 m)    Weight: 97.7 kg (215 lb 6.2 oz)    Length of need (1-99 months): 99    Does patient have medical equipment at home? none    Please check all that apply: Patient's condition impairs ambulation.    Please check all that apply: Patient is unable to safely ambulate without equipment.      COMMODE FOR HOME USE     Order Specific Question Answer Comments   Type: Standard    Height: 5' 11" (1.803 m)    Weight: 97.7 kg (215 lb 6.2 oz)    Does patient have medical equipment at home? none    Length of need (1-99 months): 99      Activity as tolerated       Significant Diagnostic Studies: Labs:   BMP:   Recent Labs   Lab 11/26/20  0217  11/26/20  1013 11/26/20  1440 11/27/20  0540   *   < > 295* 318* 241*   *   < > 131* 134* 138   K 2.6*   < > 3.6 3.6 3.1*   CL 86*   < > 89* 90* 93*   CO2 38*   < > 33* " 32* 33*   BUN 21   < > 19 18 16   CREATININE 2.0*   < > 1.8* 1.7* 1.6*   CALCIUM 7.8*   < > 7.9* 8.0* 8.1*   MG 1.5*  --   --   --  1.8    < > = values in this interval not displayed.   , CMP   Recent Labs   Lab 11/26/20  0638 11/26/20  1013 11/26/20  1440 11/27/20  0540     136 131* 134* 138   K 2.9*  2.9* 3.6 3.6 3.1*   CL 88*  88* 89* 90* 93*   CO2 37*  37* 33* 32* 33*   *  268* 295* 318* 241*   BUN 20  20 19 18 16   CREATININE 1.8*  1.8* 1.8* 1.7* 1.6*   CALCIUM 7.9*  7.9* 7.9* 8.0* 8.1*   PROT 6.5  --   --   --    ALBUMIN 3.2*  --   --   --    BILITOT 1.0  --   --   --    ALKPHOS 78  --   --   --    AST 43*  --   --   --    ALT 23  --   --   --    ANIONGAP 11  11 9 12 12   ESTGFRAFRICA 43*  43* 43* 46* 50*   EGFRNONAA 37*  37* 37* 40* 43*   , CBC   Recent Labs   Lab 11/26/20  0217 11/27/20  0540   WBC 6.48 7.48   HGB 10.4* 11.0*   HCT 28.8* 30.9*    196    and Troponin   Recent Labs   Lab 11/24/20 2001 11/24/20  2157 11/25/20  0655   TROPONINI 0.060* 0.067* 0.070*     Radiology: X-Ray: CXR: X-Ray Chest 1 View (CXR): No results found for this visit on 11/24/20. and X-Ray Chest PA and Lateral (CXR): No results found for this visit on 11/24/20.  Nuclear Medicine: Lexiscan     Pending Diagnostic Studies:     Procedure Component Value Units Date/Time    Aldosterone/Renin Activity Ratio [[id number]] Collected: 11/25/20 0433    Order Status: Sent Lab Status: In process Updated: 11/25/20 0554    Specimen: Blood          Medications:  Reconciled Home Medications:      Medication List      START taking these medications    hydrALAZINE 50 MG tablet  Commonly known as: APRESOLINE  Take 1 tablet (50 mg total) by mouth 3 (three) times daily.     multivitamin Tab  Take 1 tablet by mouth once daily.  Start taking on: November 28, 2020     thiamine 100 MG tablet  Take 1 tablet (100 mg total) by mouth once daily.  Start taking on: November 28, 2020        CHANGE how you take these medications     carvediloL 12.5 MG tablet  Commonly known as: COREG  Take 1 tablet (12.5 mg total) by mouth 2 (two) times daily.  What changed: how much to take     furosemide 40 MG tablet  Commonly known as: LASIX  Take 1 tablet (40 mg total) by mouth once daily.  What changed: when to take this     NovoLOG Mix 70-30 U-100 Insuln 100 unit/mL (70-30) Soln  Generic drug: insulin asp prt-insulin aspart (NOVOLOG 70/30)  Inject 10 Units into the skin before meals as needed.  What changed: See the new instructions.        CONTINUE taking these medications    amLODIPine 10 MG tablet  Commonly known as: NORVASC  Take 10 mg by mouth once daily.     aspirin 81 MG EC tablet  Commonly known as: ECOTRIN  Take 1 tablet (81 mg total) by mouth once daily.     aspirin-calcium carbonate 81 mg-300 mg calcium(777 mg) Tab  Take 81 mg by mouth.     atorvastatin 80 MG tablet  Commonly known as: LIPITOR  Take 80 mg by mouth every evening.     atropine 1% 1 % Drop  Commonly known as: ISOPTO ATROPINE  INT 1 GTT INTO OD QD FOR 1 WK     colchicine 0.6 mg tablet  Commonly known as: COLCRYS  Take 1 tablet (0.6 mg total) by mouth once daily. for 14 days     DurezoL 0.05 % Drop ophthalmic solution  Generic drug: difluprednate  INT 1 GTT INTO OD FID FOR 3 WEEKS     fenofibrate 160 MG Tab  Take 160 mg by mouth.     folic acid 1 MG tablet  Commonly known as: FOLVITE  Take 1 mg by mouth.     insulin syringe-needle U-100 1 mL 31 gauge x 5/16 Syrg  USE TO INJECT INSULIN TWICE A DAY     lansoprazole 30 MG capsule  Commonly known as: PREVACID  Take 30 mg by mouth once daily.     magnesium oxide 400 mg (241.3 mg magnesium) tablet  Commonly known as: MAG-OX  Take 800 mg by mouth.     pantoprazole 40 MG tablet  Commonly known as: PROTONIX  Take 1 tablet (40 mg total) by mouth 2 (two) times daily.     rosuvastatin 40 MG Tab  Commonly known as: CRESTOR  Take 10 mg by mouth once daily.     sildenafiL 100 MG tablet  Commonly known as: VIAGRA  Take 1 tablet (100 mg total) by  mouth daily as needed for Erectile Dysfunction. *generic tabs*     tamsulosin 0.4 mg Cap  Commonly known as: FLOMAX  Take 1 capsule (0.4 mg total) by mouth once daily.     terbinafine  mg tablet  Commonly known as: LAMISIL        STOP taking these medications    losartan-hydrochlorothiazide 100-25 mg 100-25 mg per tablet  Commonly known as: HYZAAR     metFORMIN 1000 MG tablet  Commonly known as: GLUCOPHAGE            Indwelling Lines/Drains at time of discharge:   Lines/Drains/Airways     None                 Time spent on the discharge of patient: over 30  minutes  Patient was seen and examined on the date of discharge and determined to be suitable for discharge.         Raoul Morse MD  Department of Hospital Medicine  [facility]

## 2020-11-27 NOTE — PLAN OF CARE
11/27/20 1113   Post-Acute Status   Post-Acute Authorization HME;Home Health;Other   HME Status Set-up Complete  (RW/BSC delivered to room)   Home Health Status Set-up Complete  (per Ziyad, pt previously had Family [facility]; referral sent and accepted; signed choice letter placed in chart)   Other Status Awaiting f/u Appts  (contacted ROBERTH Acosta ([facility]) to inform of DC; pending f/u appts)   Patient choice form signed by patient/caregiver List from [facility]   Discharge Delays None known at this time   Discharge Plan   Discharge Plan A Home Health

## 2020-11-28 LAB
ALDOST SERPL-MCNC: 3.3 NG/DL
ALDOST/RENIN PLAS-RTO: 8.4 RATIO
RENIN PLAS-CCNC: 0.4 NG/ML/HR

## 2020-11-30 ENCOUNTER — PATIENT OUTREACH (OUTPATIENT)
Dept: ADMINISTRATIVE | Facility: CLINIC | Age: 70
End: 2020-11-30

## 2020-11-30 NOTE — PROGRESS NOTES
C3 nurse attempted to contact patient. No answer.  C3 nurse attempted to contact Chato Jamir  for a TCC post hospital discharge follow up call. No answer at phone number listed and no voicemail available. The patient has a [facility] appointment with Irlanda Valenzuela  on 12/1 @ 330pm(virtual).

## 2020-12-04 ENCOUNTER — HOSPITAL ENCOUNTER (INPATIENT)
Facility: HOSPITAL | Age: 70
LOS: 7 days | Discharge: HOME | DRG: 291 | End: 2020-12-11
Attending: EMERGENCY MEDICINE | Admitting: EMERGENCY MEDICINE
Payer: MEDICARE

## 2020-12-04 DIAGNOSIS — R07.9 CHEST PAIN: ICD-10-CM

## 2020-12-04 DIAGNOSIS — I48.0 PAROXYSMAL ATRIAL FIBRILLATION: ICD-10-CM

## 2020-12-04 DIAGNOSIS — N18.32 STAGE 3B CHRONIC KIDNEY DISEASE: ICD-10-CM

## 2020-12-04 DIAGNOSIS — I50.33 ACUTE ON CHRONIC DIASTOLIC CONGESTIVE HEART FAILURE: ICD-10-CM

## 2020-12-04 DIAGNOSIS — R00.0 TACHYCARDIA: ICD-10-CM

## 2020-12-04 DIAGNOSIS — R06.02 SHORTNESS OF BREATH: ICD-10-CM

## 2020-12-04 DIAGNOSIS — R09.02 HYPOXEMIA: ICD-10-CM

## 2020-12-04 DIAGNOSIS — I48.92 ATRIAL FLUTTER, UNSPECIFIED TYPE: Primary | ICD-10-CM

## 2020-12-04 DIAGNOSIS — I50.9 CHF (CONGESTIVE HEART FAILURE): ICD-10-CM

## 2020-12-04 LAB
ALBUMIN SERPL BCP-MCNC: 3 G/DL (ref 3.5–5.2)
ALP SERPL-CCNC: 58 U/L (ref 55–135)
ALT SERPL W/O P-5'-P-CCNC: 26 U/L (ref 10–44)
ANION GAP SERPL CALC-SCNC: 15 MMOL/L (ref 8–16)
AST SERPL-CCNC: 29 U/L (ref 10–40)
BASOPHILS # BLD AUTO: 0.06 K/UL (ref 0–0.2)
BASOPHILS NFR BLD: 0.8 % (ref 0–1.9)
BILIRUB SERPL-MCNC: 0.6 MG/DL (ref 0.1–1)
BUN SERPL-MCNC: 19 MG/DL (ref 8–23)
CALCIUM SERPL-MCNC: 8.1 MG/DL (ref 8.7–10.5)
CHLORIDE SERPL-SCNC: 98 MMOL/L (ref 95–110)
CO2 SERPL-SCNC: 26 MMOL/L (ref 23–29)
CREAT SERPL-MCNC: 1.7 MG/DL (ref 0.5–1.4)
CTP QC/QA: YES
D DIMER PPP IA.FEU-MCNC: 1.67 MG/L FEU
DIFFERENTIAL METHOD: ABNORMAL
EOSINOPHIL # BLD AUTO: 0.2 K/UL (ref 0–0.5)
EOSINOPHIL NFR BLD: 2.4 % (ref 0–8)
ERYTHROCYTE [DISTWIDTH] IN BLOOD BY AUTOMATED COUNT: 13.8 % (ref 11.5–14.5)
EST. GFR  (AFRICAN AMERICAN): 46 ML/MIN/1.73 M^2
EST. GFR  (NON AFRICAN AMERICAN): 40 ML/MIN/1.73 M^2
GLUCOSE SERPL-MCNC: 224 MG/DL (ref 70–110)
HCT VFR BLD AUTO: 27.2 % (ref 40–54)
HGB BLD-MCNC: 9.2 G/DL (ref 14–18)
IMM GRANULOCYTES # BLD AUTO: 0.03 K/UL (ref 0–0.04)
IMM GRANULOCYTES NFR BLD AUTO: 0.4 % (ref 0–0.5)
LYMPHOCYTES # BLD AUTO: 1.1 K/UL (ref 1–4.8)
LYMPHOCYTES NFR BLD: 14.8 % (ref 18–48)
MCH RBC QN AUTO: 28.3 PG (ref 27–31)
MCHC RBC AUTO-ENTMCNC: 33.8 G/DL (ref 32–36)
MCV RBC AUTO: 84 FL (ref 82–98)
MONOCYTES # BLD AUTO: 1.1 K/UL (ref 0.3–1)
MONOCYTES NFR BLD: 14.6 % (ref 4–15)
NEUTROPHILS # BLD AUTO: 5.1 K/UL (ref 1.8–7.7)
NEUTROPHILS NFR BLD: 67 % (ref 38–73)
NRBC BLD-RTO: 0 /100 WBC
PLATELET # BLD AUTO: 243 K/UL (ref 150–350)
PMV BLD AUTO: 9 FL (ref 9.2–12.9)
POTASSIUM SERPL-SCNC: 2.6 MMOL/L (ref 3.5–5.1)
PROT SERPL-MCNC: 6.5 G/DL (ref 6–8.4)
RBC # BLD AUTO: 3.25 M/UL (ref 4.6–6.2)
SARS-COV-2 RDRP RESP QL NAA+PROBE: NEGATIVE
SODIUM SERPL-SCNC: 139 MMOL/L (ref 136–145)
TROPONIN I SERPL DL<=0.01 NG/ML-MCNC: 0.02 NG/ML (ref 0–0.03)
WBC # BLD AUTO: 7.58 K/UL (ref 3.9–12.7)

## 2020-12-04 PROCEDURE — 93005 ELECTROCARDIOGRAM TRACING: CPT

## 2020-12-04 PROCEDURE — 96375 TX/PRO/DX INJ NEW DRUG ADDON: CPT

## 2020-12-04 PROCEDURE — 80053 COMPREHEN METABOLIC PANEL: CPT

## 2020-12-04 PROCEDURE — 63600175 PHARM REV CODE 636 W HCPCS: Performed by: EMERGENCY MEDICINE

## 2020-12-04 PROCEDURE — 84484 ASSAY OF TROPONIN QUANT: CPT

## 2020-12-04 PROCEDURE — 83880 ASSAY OF NATRIURETIC PEPTIDE: CPT

## 2020-12-04 PROCEDURE — 93010 EKG 12-LEAD: ICD-10-PCS | Mod: ,,, | Performed by: INTERNAL MEDICINE

## 2020-12-04 PROCEDURE — 85379 FIBRIN DEGRADATION QUANT: CPT

## 2020-12-04 PROCEDURE — 85025 COMPLETE CBC W/AUTO DIFF WBC: CPT

## 2020-12-04 PROCEDURE — 99291 CRITICAL CARE FIRST HOUR: CPT | Mod: 25

## 2020-12-04 PROCEDURE — 25000003 PHARM REV CODE 250: Performed by: EMERGENCY MEDICINE

## 2020-12-04 PROCEDURE — 12000002 HC ACUTE/MED SURGE SEMI-PRIVATE ROOM

## 2020-12-04 PROCEDURE — 93010 ELECTROCARDIOGRAM REPORT: CPT | Mod: ,,, | Performed by: INTERNAL MEDICINE

## 2020-12-04 PROCEDURE — U0002 COVID-19 LAB TEST NON-CDC: HCPCS | Performed by: EMERGENCY MEDICINE

## 2020-12-04 PROCEDURE — 83735 ASSAY OF MAGNESIUM: CPT

## 2020-12-04 RX ORDER — ACETAMINOPHEN 500 MG
1000 TABLET ORAL
Status: COMPLETED | OUTPATIENT
Start: 2020-12-04 | End: 2020-12-05

## 2020-12-04 RX ORDER — ONDANSETRON 2 MG/ML
4 INJECTION INTRAMUSCULAR; INTRAVENOUS
Status: COMPLETED | OUTPATIENT
Start: 2020-12-04 | End: 2020-12-04

## 2020-12-04 RX ORDER — ASPIRIN 325 MG
325 TABLET ORAL
Status: COMPLETED | OUTPATIENT
Start: 2020-12-04 | End: 2020-12-04

## 2020-12-04 RX ORDER — MORPHINE SULFATE 4 MG/ML
4 INJECTION, SOLUTION INTRAMUSCULAR; INTRAVENOUS
Status: DISCONTINUED | OUTPATIENT
Start: 2020-12-04 | End: 2020-12-04

## 2020-12-04 RX ADMIN — ASPIRIN 325 MG ORAL TABLET 325 MG: 325 PILL ORAL at 11:12

## 2020-12-04 RX ADMIN — ONDANSETRON 4 MG: 2 INJECTION INTRAMUSCULAR; INTRAVENOUS at 11:12

## 2020-12-05 PROBLEM — I50.33 ACUTE ON CHRONIC DIASTOLIC CONGESTIVE HEART FAILURE: Status: ACTIVE | Noted: 2020-03-20

## 2020-12-05 PROBLEM — I50.9 CHF (CONGESTIVE HEART FAILURE): Status: RESOLVED | Noted: 2020-12-05 | Resolved: 2020-12-05

## 2020-12-05 PROBLEM — J96.01 ACUTE HYPOXEMIC RESPIRATORY FAILURE: Status: ACTIVE | Noted: 2020-12-05

## 2020-12-05 PROBLEM — R07.89 OTHER CHEST PAIN: Status: ACTIVE | Noted: 2020-12-05

## 2020-12-05 PROBLEM — J18.9 PNEUMONIA OF RIGHT LOWER LOBE DUE TO INFECTIOUS ORGANISM: Status: ACTIVE | Noted: 2020-12-05

## 2020-12-05 PROBLEM — I50.9 CHF (CONGESTIVE HEART FAILURE): Status: ACTIVE | Noted: 2020-12-05

## 2020-12-05 LAB
ANION GAP SERPL CALC-SCNC: 12 MMOL/L (ref 8–16)
BASOPHILS # BLD AUTO: 0.06 K/UL (ref 0–0.2)
BASOPHILS NFR BLD: 0.8 % (ref 0–1.9)
BNP SERPL-MCNC: 868 PG/ML (ref 0–99)
BUN SERPL-MCNC: 19 MG/DL (ref 8–23)
CALCIUM SERPL-MCNC: 8.4 MG/DL (ref 8.7–10.5)
CHLORIDE SERPL-SCNC: 97 MMOL/L (ref 95–110)
CO2 SERPL-SCNC: 29 MMOL/L (ref 23–29)
CREAT SERPL-MCNC: 1.7 MG/DL (ref 0.5–1.4)
DIFFERENTIAL METHOD: ABNORMAL
EOSINOPHIL # BLD AUTO: 0.3 K/UL (ref 0–0.5)
EOSINOPHIL NFR BLD: 3.5 % (ref 0–8)
ERYTHROCYTE [DISTWIDTH] IN BLOOD BY AUTOMATED COUNT: 13.9 % (ref 11.5–14.5)
EST. GFR  (AFRICAN AMERICAN): 46 ML/MIN/1.73 M^2
EST. GFR  (NON AFRICAN AMERICAN): 40 ML/MIN/1.73 M^2
GLUCOSE SERPL-MCNC: 195 MG/DL (ref 70–110)
HCT VFR BLD AUTO: 29.6 % (ref 40–54)
HGB BLD-MCNC: 9.9 G/DL (ref 14–18)
IMM GRANULOCYTES # BLD AUTO: 0.03 K/UL (ref 0–0.04)
IMM GRANULOCYTES NFR BLD AUTO: 0.4 % (ref 0–0.5)
LYMPHOCYTES # BLD AUTO: 1 K/UL (ref 1–4.8)
LYMPHOCYTES NFR BLD: 13.6 % (ref 18–48)
MAGNESIUM SERPL-MCNC: 1.4 MG/DL (ref 1.6–2.6)
MCH RBC QN AUTO: 28.2 PG (ref 27–31)
MCHC RBC AUTO-ENTMCNC: 33.4 G/DL (ref 32–36)
MCV RBC AUTO: 84 FL (ref 82–98)
MONOCYTES # BLD AUTO: 1.1 K/UL (ref 0.3–1)
MONOCYTES NFR BLD: 15.3 % (ref 4–15)
NEUTROPHILS # BLD AUTO: 4.8 K/UL (ref 1.8–7.7)
NEUTROPHILS NFR BLD: 66.4 % (ref 38–73)
NRBC BLD-RTO: 0 /100 WBC
PLATELET # BLD AUTO: 278 K/UL (ref 150–350)
PMV BLD AUTO: 9.3 FL (ref 9.2–12.9)
POCT GLUCOSE: 177 MG/DL (ref 70–110)
POCT GLUCOSE: 181 MG/DL (ref 70–110)
POCT GLUCOSE: 198 MG/DL (ref 70–110)
POCT GLUCOSE: 211 MG/DL (ref 70–110)
POTASSIUM SERPL-SCNC: 3.4 MMOL/L (ref 3.5–5.1)
PROCALCITONIN SERPL IA-MCNC: 0.11 NG/ML
RBC # BLD AUTO: 3.51 M/UL (ref 4.6–6.2)
SODIUM SERPL-SCNC: 138 MMOL/L (ref 136–145)
TROPONIN I SERPL DL<=0.01 NG/ML-MCNC: 0.01 NG/ML (ref 0–0.03)
TROPONIN I SERPL DL<=0.01 NG/ML-MCNC: 0.02 NG/ML (ref 0–0.03)
WBC # BLD AUTO: 7.21 K/UL (ref 3.9–12.7)

## 2020-12-05 PROCEDURE — 94761 N-INVAS EAR/PLS OXIMETRY MLT: CPT

## 2020-12-05 PROCEDURE — 63600175 PHARM REV CODE 636 W HCPCS: Performed by: EMERGENCY MEDICINE

## 2020-12-05 PROCEDURE — 25000003 PHARM REV CODE 250: Performed by: EMERGENCY MEDICINE

## 2020-12-05 PROCEDURE — 25000003 PHARM REV CODE 250: Performed by: STUDENT IN AN ORGANIZED HEALTH CARE EDUCATION/TRAINING PROGRAM

## 2020-12-05 PROCEDURE — 85025 COMPLETE CBC W/AUTO DIFF WBC: CPT

## 2020-12-05 PROCEDURE — 96361 HYDRATE IV INFUSION ADD-ON: CPT

## 2020-12-05 PROCEDURE — 25000003 PHARM REV CODE 250: Performed by: INTERNAL MEDICINE

## 2020-12-05 PROCEDURE — 94640 AIRWAY INHALATION TREATMENT: CPT

## 2020-12-05 PROCEDURE — 96365 THER/PROPH/DIAG IV INF INIT: CPT

## 2020-12-05 PROCEDURE — 63600175 PHARM REV CODE 636 W HCPCS: Performed by: STUDENT IN AN ORGANIZED HEALTH CARE EDUCATION/TRAINING PROGRAM

## 2020-12-05 PROCEDURE — 25000242 PHARM REV CODE 250 ALT 637 W/ HCPCS: Performed by: STUDENT IN AN ORGANIZED HEALTH CARE EDUCATION/TRAINING PROGRAM

## 2020-12-05 PROCEDURE — 63600175 PHARM REV CODE 636 W HCPCS: Performed by: HOSPITALIST

## 2020-12-05 PROCEDURE — 84145 PROCALCITONIN (PCT): CPT

## 2020-12-05 PROCEDURE — 96375 TX/PRO/DX INJ NEW DRUG ADDON: CPT

## 2020-12-05 PROCEDURE — 99223 PR INITIAL HOSPITAL CARE,LEVL III: ICD-10-PCS | Mod: ,,, | Performed by: INTERNAL MEDICINE

## 2020-12-05 PROCEDURE — 99223 1ST HOSP IP/OBS HIGH 75: CPT | Mod: ,,, | Performed by: INTERNAL MEDICINE

## 2020-12-05 PROCEDURE — 21400001 HC TELEMETRY ROOM

## 2020-12-05 PROCEDURE — 27000221 HC OXYGEN, UP TO 24 HOURS

## 2020-12-05 PROCEDURE — 25500020 PHARM REV CODE 255: Performed by: EMERGENCY MEDICINE

## 2020-12-05 PROCEDURE — 84484 ASSAY OF TROPONIN QUANT: CPT

## 2020-12-05 PROCEDURE — 93010 ELECTROCARDIOGRAM REPORT: CPT | Mod: ,,, | Performed by: INTERNAL MEDICINE

## 2020-12-05 PROCEDURE — 36415 COLL VENOUS BLD VENIPUNCTURE: CPT

## 2020-12-05 PROCEDURE — 80048 BASIC METABOLIC PNL TOTAL CA: CPT

## 2020-12-05 PROCEDURE — 93005 ELECTROCARDIOGRAM TRACING: CPT

## 2020-12-05 PROCEDURE — 93010 EKG 12-LEAD: ICD-10-PCS | Mod: ,,, | Performed by: INTERNAL MEDICINE

## 2020-12-05 PROCEDURE — 96376 TX/PRO/DX INJ SAME DRUG ADON: CPT

## 2020-12-05 RX ORDER — SODIUM CHLORIDE 0.9 % (FLUSH) 0.9 %
10 SYRINGE (ML) INJECTION
Status: DISCONTINUED | OUTPATIENT
Start: 2020-12-05 | End: 2020-12-11 | Stop reason: HOSPADM

## 2020-12-05 RX ORDER — FENOFIBRATE 160 MG/1
160 TABLET ORAL DAILY
Status: DISCONTINUED | OUTPATIENT
Start: 2020-12-05 | End: 2020-12-10

## 2020-12-05 RX ORDER — DILTIAZEM HYDROCHLORIDE 5 MG/ML
10 INJECTION INTRAVENOUS
Status: COMPLETED | OUTPATIENT
Start: 2020-12-05 | End: 2020-12-05

## 2020-12-05 RX ORDER — ASPIRIN 81 MG/1
81 TABLET ORAL DAILY
Status: DISCONTINUED | OUTPATIENT
Start: 2020-12-05 | End: 2020-12-05

## 2020-12-05 RX ORDER — POTASSIUM CHLORIDE 7.45 MG/ML
10 INJECTION INTRAVENOUS ONCE
Status: COMPLETED | OUTPATIENT
Start: 2020-12-05 | End: 2020-12-05

## 2020-12-05 RX ORDER — CARVEDILOL 3.12 MG/1
3.12 TABLET ORAL 2 TIMES DAILY
Status: DISCONTINUED | OUTPATIENT
Start: 2020-12-05 | End: 2020-12-06

## 2020-12-05 RX ORDER — ROSUVASTATIN CALCIUM 10 MG/1
10 TABLET, COATED ORAL DAILY
Status: DISCONTINUED | OUTPATIENT
Start: 2020-12-05 | End: 2020-12-05

## 2020-12-05 RX ORDER — BENZONATATE 100 MG/1
100 CAPSULE ORAL 3 TIMES DAILY PRN
Status: DISCONTINUED | OUTPATIENT
Start: 2020-12-05 | End: 2020-12-11 | Stop reason: HOSPADM

## 2020-12-05 RX ORDER — DILTIAZEM HYDROCHLORIDE 120 MG/1
120 CAPSULE, COATED, EXTENDED RELEASE ORAL
Status: COMPLETED | OUTPATIENT
Start: 2020-12-05 | End: 2020-12-05

## 2020-12-05 RX ORDER — LANOLIN ALCOHOL/MO/W.PET/CERES
100 CREAM (GRAM) TOPICAL DAILY
Status: DISCONTINUED | OUTPATIENT
Start: 2020-12-05 | End: 2020-12-10

## 2020-12-05 RX ORDER — LANOLIN ALCOHOL/MO/W.PET/CERES
400 CREAM (GRAM) TOPICAL DAILY
Status: DISCONTINUED | OUTPATIENT
Start: 2020-12-05 | End: 2020-12-07

## 2020-12-05 RX ORDER — LEVALBUTEROL INHALATION SOLUTION 0.63 MG/3ML
0.63 SOLUTION RESPIRATORY (INHALATION) EVERY 8 HOURS
Status: DISCONTINUED | OUTPATIENT
Start: 2020-12-05 | End: 2020-12-11 | Stop reason: HOSPADM

## 2020-12-05 RX ORDER — IBUPROFEN 200 MG
24 TABLET ORAL
Status: DISCONTINUED | OUTPATIENT
Start: 2020-12-05 | End: 2020-12-11 | Stop reason: HOSPADM

## 2020-12-05 RX ORDER — INSULIN ASPART 100 [IU]/ML
0-5 INJECTION, SOLUTION INTRAVENOUS; SUBCUTANEOUS
Status: DISCONTINUED | OUTPATIENT
Start: 2020-12-05 | End: 2020-12-11 | Stop reason: HOSPADM

## 2020-12-05 RX ORDER — ATORVASTATIN CALCIUM 40 MG/1
80 TABLET, FILM COATED ORAL NIGHTLY
Status: DISCONTINUED | OUTPATIENT
Start: 2020-12-05 | End: 2020-12-11 | Stop reason: HOSPADM

## 2020-12-05 RX ORDER — GUAIFENESIN 100 MG/5ML
200 SOLUTION ORAL EVERY 4 HOURS PRN
Status: DISCONTINUED | OUTPATIENT
Start: 2020-12-05 | End: 2020-12-11 | Stop reason: HOSPADM

## 2020-12-05 RX ORDER — FUROSEMIDE 10 MG/ML
40 INJECTION INTRAMUSCULAR; INTRAVENOUS
Status: COMPLETED | OUTPATIENT
Start: 2020-12-05 | End: 2020-12-05

## 2020-12-05 RX ORDER — MAGNESIUM SULFATE HEPTAHYDRATE 40 MG/ML
2 INJECTION, SOLUTION INTRAVENOUS
Status: COMPLETED | OUTPATIENT
Start: 2020-12-05 | End: 2020-12-05

## 2020-12-05 RX ORDER — IBUPROFEN 200 MG
16 TABLET ORAL
Status: DISCONTINUED | OUTPATIENT
Start: 2020-12-05 | End: 2020-12-11 | Stop reason: HOSPADM

## 2020-12-05 RX ORDER — FUROSEMIDE 10 MG/ML
40 INJECTION INTRAMUSCULAR; INTRAVENOUS 2 TIMES DAILY
Status: DISCONTINUED | OUTPATIENT
Start: 2020-12-05 | End: 2020-12-08

## 2020-12-05 RX ORDER — GLUCAGON 1 MG
1 KIT INJECTION
Status: DISCONTINUED | OUTPATIENT
Start: 2020-12-05 | End: 2020-12-11 | Stop reason: HOSPADM

## 2020-12-05 RX ADMIN — Medication 100 MG: at 09:12

## 2020-12-05 RX ADMIN — ACETAMINOPHEN 1000 MG: 500 TABLET ORAL at 12:12

## 2020-12-05 RX ADMIN — FUROSEMIDE 40 MG: 10 INJECTION, SOLUTION INTRAVENOUS at 09:12

## 2020-12-05 RX ADMIN — THERA TABS 1 TABLET: TAB at 09:12

## 2020-12-05 RX ADMIN — POTASSIUM BICARBONATE 50 MEQ: 978 TABLET, EFFERVESCENT ORAL at 12:12

## 2020-12-05 RX ADMIN — DILTIAZEM HYDROCHLORIDE 120 MG: 120 CAPSULE, COATED, EXTENDED RELEASE ORAL at 01:12

## 2020-12-05 RX ADMIN — Medication 400 MG: at 09:12

## 2020-12-05 RX ADMIN — DOXYCYCLINE 100 MG: 100 INJECTION, POWDER, LYOPHILIZED, FOR SOLUTION INTRAVENOUS at 09:12

## 2020-12-05 RX ADMIN — POTASSIUM CHLORIDE 10 MEQ: 7.46 INJECTION, SOLUTION INTRAVENOUS at 12:12

## 2020-12-05 RX ADMIN — IOHEXOL 75 ML: 350 INJECTION, SOLUTION INTRAVENOUS at 01:12

## 2020-12-05 RX ADMIN — FUROSEMIDE 40 MG: 10 INJECTION, SOLUTION INTRAVENOUS at 12:12

## 2020-12-05 RX ADMIN — CARVEDILOL 3.12 MG: 3.12 TABLET, FILM COATED ORAL at 09:12

## 2020-12-05 RX ADMIN — ATORVASTATIN CALCIUM 80 MG: 40 TABLET, FILM COATED ORAL at 09:12

## 2020-12-05 RX ADMIN — CARVEDILOL 3.12 MG: 3.12 TABLET, FILM COATED ORAL at 12:12

## 2020-12-05 RX ADMIN — DILTIAZEM HYDROCHLORIDE 10 MG: 5 INJECTION INTRAVENOUS at 02:12

## 2020-12-05 RX ADMIN — APIXABAN 5 MG: 5 TABLET, FILM COATED ORAL at 09:12

## 2020-12-05 RX ADMIN — INSULIN ASPART 1 UNITS: 100 INJECTION, SOLUTION INTRAVENOUS; SUBCUTANEOUS at 09:12

## 2020-12-05 RX ADMIN — LEVALBUTEROL HYDROCHLORIDE 0.63 MG: 0.63 SOLUTION RESPIRATORY (INHALATION) at 03:12

## 2020-12-05 RX ADMIN — ASPIRIN 81 MG: 81 TABLET, COATED ORAL at 09:12

## 2020-12-05 RX ADMIN — SODIUM CHLORIDE 250 ML: 0.9 INJECTION, SOLUTION INTRAVENOUS at 12:12

## 2020-12-05 RX ADMIN — DILTIAZEM HYDROCHLORIDE 10 MG: 5 INJECTION INTRAVENOUS at 12:12

## 2020-12-05 RX ADMIN — MAGNESIUM SULFATE 2 G: 2 INJECTION INTRAVENOUS at 02:12

## 2020-12-05 RX ADMIN — LEVALBUTEROL HYDROCHLORIDE 0.63 MG: 0.63 SOLUTION RESPIRATORY (INHALATION) at 09:12

## 2020-12-05 RX ADMIN — FENOFIBRATE 160 MG: 160 TABLET ORAL at 09:12

## 2020-12-05 NOTE — ASSESSMENT & PLAN NOTE
Hemoglobin appears slightly lower than on previous admissions.  No evidence of bleeding at this time.  Will continue diuresis and monitor.

## 2020-12-05 NOTE — CONSULTS
[facility]  Cardiology  Consult Note    Patient Name: Chato Bowie  MRN: [id number]  Admission Date: 12/4/2020  Hospital Length of Stay: 0 days  Code Status: Full Code   Attending Provider: Sea Phelan MD   Consulting Provider: Indra Foote MD  Primary Care Physician: [facility]  Principal Problem:Acute hypoxemic respiratory failure    Patient information was obtained from patient and ER records.     Inpatient consult to Cardiology  Consult performed by: Indra Foote MD  Consult ordered by: Gildardo Ho NP  Reason for consult: AFib        Subjective:     Chief Complaint:  SOB/cough     HPI:   70-year-old male with a past medical history of coronary artery disease, hypertension, diabetes, BPH, alcohol use who presents after he has been having a cough for 1 week.  The patient was recently admitted for chest pain and underwent nuclear stress test.  She had a fixed defect the patient was discharged with follow-up.  The patient states that ever since he was discharged, he has been having a dry cough and it has gotten so bad that it makes his chest hurt.  He is not producing any sputum.  Otherwise, he does not have chest pain on exertion.  Only when he coughs.  He denies fevers or chills but endorses shortness of breath with exertion in anything that he does.  He is not able to lie flat.  His lower extremities do have some swelling he states that this is his baseline and not worse than usual.  He denies smoking and denies any history of lung disease.  However he does believe that he may have some sort of asthma. The patient states he has not had any alcohol since he was discharged last week.    Cardiology consulted for possible atrial arrhythmia.    Patient seen by Dr. Llamas last week with elevated troponin and ARF as well as aortic stenosis.  Nuclear stress test was negative.    The patient presents with shortness of breath respiratory phasic chest pain.  He is noted to  be in a tachycardia which apparently his atrial fibrillation on telemetry on my personal review.  His EKG done earlier today suggest possible atypical flutter.  Nevertheless, the patient is unaware of the arrhythmia aside from his dyspnea.  He denies any history of stroke or TIA.  His chads Vasc score is at least 2.  I discussed the need to initiate anticoagulation and he is agreeable.  I also discussed possible MARIA cardioversion and the patient is agreeable to proceeding with the procedure.    Past Medical History:   Diagnosis Date    Acute congestive heart failure 3/20/2020    Alcohol abuse     Arthritis     Coronary artery disease     Diabetes mellitus     Hyperlipemia     Hypertension     Rhabdomyolysis        Past Surgical History:   Procedure Laterality Date    EYE SURGERY      VASCULAR SURGERY         Review of patient's allergies indicates:  No Known Allergies    No current facility-administered medications on file prior to encounter.      Current Outpatient Medications on File Prior to Encounter   Medication Sig    amlodipine (NORVASC) 10 MG tablet Take 10 mg by mouth once daily.    aspirin (ECOTRIN) 81 MG EC tablet Take 1 tablet (81 mg total) by mouth once daily.    aspirin-calcium carbonate 81 mg-300 mg calcium(777 mg) Tab Take 81 mg by mouth.    atorvastatin (LIPITOR) 80 MG tablet Take 80 mg by mouth every evening.    atropine 1% (ISOPTO ATROPINE) 1 % Drop INT 1 GTT INTO OD QD FOR 1 WK    carvedilol (COREG) 12.5 MG tablet Take 1 tablet (12.5 mg total) by mouth 2 (two) times daily. (Patient taking differently: Take 25 mg by mouth 2 (two) times daily. )    colchicine (COLCRYS) 0.6 mg tablet Take 1 tablet (0.6 mg total) by mouth once daily. for 14 days    DUREZOL 0.05 % Drop ophthalmic solution INT 1 GTT INTO OD FID FOR 3 WEEKS    fenofibrate 160 MG Tab Take 160 mg by mouth.    folic acid (FOLVITE) 1 MG tablet Take 1 mg by mouth.    furosemide (LASIX) 40 MG tablet Take 1 tablet (40 mg  total) by mouth once daily.    hydrALAZINE (APRESOLINE) 50 MG tablet Take 1 tablet (50 mg total) by mouth 3 (three) times daily.    insulin syringe-needle U-100 1 mL 31 gauge x 5/16 Syrg USE TO INJECT INSULIN TWICE A DAY    lansoprazole (PREVACID) 30 MG capsule Take 30 mg by mouth once daily.    magnesium oxide (MAG-OX) 400 mg (241.3 mg magnesium) tablet Take 800 mg by mouth.    multivitamin Tab Take 1 tablet by mouth once daily.    NOVOLOG MIX 70-30 U-100 INSULN 100 unit/mL (70-30) Soln Inject 10 Units into the skin before meals as needed.    pantoprazole (PROTONIX) 40 MG tablet Take 1 tablet (40 mg total) by mouth 2 (two) times daily.    rosuvastatin (CRESTOR) 40 MG Tab Take 10 mg by mouth once daily.    sildenafil (VIAGRA) 100 MG tablet Take 1 tablet (100 mg total) by mouth daily as needed for Erectile Dysfunction. *generic tabs*    tamsulosin (FLOMAX) 0.4 mg Cap Take 1 capsule (0.4 mg total) by mouth once daily.    terbinafine HCl (LAMISIL) 250 mg tablet     thiamine 100 MG tablet Take 1 tablet (100 mg total) by mouth once daily.     Family History     Problem Relation (Age of Onset)    Diabetes Mother, Father, Sister    Hypertension Mother, Father, Sister        Tobacco Use    Smoking status: Never Smoker    Smokeless tobacco: Never Used   Substance and Sexual Activity    Alcohol use: Yes     Alcohol/week: 4.0 standard drinks     Types: 4 Cans of beer per week    Drug use: No    Sexual activity: Not on file     Review of Systems   Constitution: Negative for chills, diaphoresis, fever and malaise/fatigue.   HENT: Negative for nosebleeds.    Eyes: Negative for blurred vision and double vision.   Cardiovascular: Positive for chest pain (with cough). Negative for claudication, cyanosis, dyspnea on exertion, leg swelling, orthopnea, palpitations, paroxysmal nocturnal dyspnea and syncope.   Respiratory: Positive for cough and shortness of breath. Negative for wheezing.    Skin: Negative for dry skin  and poor wound healing.   Musculoskeletal: Negative for back pain, joint swelling and myalgias.   Gastrointestinal: Negative for abdominal pain, nausea and vomiting.   Genitourinary: Negative for hematuria.   Neurological: Negative for dizziness, headaches, numbness, seizures and weakness.   Psychiatric/Behavioral: Negative for altered mental status and depression.     Objective:     Vital Signs (Most Recent):  Temp: 97.9 °F (36.6 °C) (12/05/20 0728)  Pulse: 87 (12/05/20 0928)  Resp: 20 (12/05/20 0928)  BP: 116/71 (12/05/20 0728)  SpO2: 97 % (12/05/20 0928) Vital Signs (24h Range):  Temp:  [97.9 °F (36.6 °C)-99.4 °F (37.4 °C)] 97.9 °F (36.6 °C)  Pulse:  [] 87  Resp:  [18-30] 20  SpO2:  [71 %-97 %] 97 %  BP: (116-151)/() 116/71     Weight: 103.9 kg (229 lb 0.9 oz)  Body mass index is 31.95 kg/m².    SpO2: 97 %  O2 Device (Oxygen Therapy): Non Rebreather Mask      Intake/Output Summary (Last 24 hours) at 12/5/2020 1110  Last data filed at 12/5/2020 0344  Gross per 24 hour   Intake 600 ml   Output --   Net 600 ml       Lines/Drains/Airways     Peripheral Intravenous Line                 Peripheral IV - Single Lumen 12/04/20 2246 18 G Left Antecubital less than 1 day                Physical Exam   Constitutional: He is oriented to person, place, and time. He appears well-developed and well-nourished. No distress.   HENT:   Head: Normocephalic and atraumatic.   Eyes: Pupils are equal, round, and reactive to light. Conjunctivae and EOM are normal. No scleral icterus.   Neck: Normal range of motion. Neck supple. No JVD present. No tracheal deviation present. No thyromegaly present.   Cardiovascular: Normal rate, S1 normal, S2 normal and intact distal pulses. An irregularly irregular rhythm present. Exam reveals no gallop and no friction rub.   Murmur heard.   Systolic murmur is present with a grade of 1/6.  Pulmonary/Chest: Effort normal and breath sounds normal. No respiratory distress. He has no wheezes. He  has no rales. He exhibits no tenderness.   Abdominal: Soft. He exhibits no distension.   Musculoskeletal:         General: No edema.   Neurological: He is alert and oriented to person, place, and time. He has normal strength. No cranial nerve deficit.   Skin: Skin is warm and dry. No rash noted. He is not diaphoretic.   Psychiatric: He has a normal mood and affect. His behavior is normal.       Current Medications:   aspirin  81 mg Oral Daily    atorvastatin  80 mg Oral QHS    carvediloL  3.125 mg Oral BID    doxycycline (VIBRAMYCIN) IVPB  100 mg Intravenous Q12H    fenofibrate  160 mg Oral Daily    furosemide (LASIX) IV  40 mg Intravenous BID    levalbuterol  0.63 mg Nebulization Q8H    magnesium oxide  400 mg Oral Daily    multivitamin  1 tablet Oral Daily    thiamine  100 mg Oral Daily       benzonatate, dextrose 50%, dextrose 50%, glucagon (human recombinant), glucose, glucose, guaifenesin 100 mg/5 ml, insulin aspart U-100, sodium chloride 0.9%    Laboratory (all labs reviewed):  CBC:  Recent Labs   Lab 11/25/20  0433 11/26/20  0217 11/27/20  0540 12/04/20  2323 12/05/20  0645   WBC 7.08 6.48 7.48 7.58 7.21   Hemoglobin 11.9 L 10.4 L 11.0 L 9.2 L 9.9 L   Hematocrit 33.8 L 28.8 L 30.9 L 27.2 L 29.6 L   Platelets 197 177 196 243 278       CHEMISTRIES:  Recent Labs   Lab 11/24/20 2001 11/24/20  2157  11/26/20  0217  11/26/20  1013 11/26/20  1440 11/27/20  0540 12/04/20  2323 12/05/20  0645   Glucose  --   --    < > 295 H   < > 295 H 318 H 241 H 224 H 195 H   Sodium  --   --    < > 135 L   < > 131 L 134 L 138 139 138   Potassium 2.4 LL 2.3 LL   < > 2.6 LL   < > 3.6 3.6 3.1 L 2.6 LL 3.4 L   BUN  --   --    < > 21   < > 19 18 16 19 19   Creatinine  --   --    < > 2.0 H   < > 1.8 H 1.7 H 1.6 H 1.7 H 1.7 H   eGFR if   --   --    < > 38 A   < > 43 A 46 A 50 A 46 A 46 A   eGFR if non   --   --    < > 33 A   < > 37 A 40 A 43 A 40 A 40 A   Calcium  --   --    < > 7.8 L   < > 7.9 L  8.0 L 8.1 L 8.1 L 8.4 L   Magnesium 2.1 1.8  --  1.5 L  --   --   --  1.8 1.4 L  --     < > = values in this interval not displayed.       CARDIAC BIOMARKERS:  Recent Labs   Lab 11/24/20 2001 11/24/20 2157 11/25/20  0655 12/04/20  2323 12/05/20  0645    H  --   --   --   --    Troponin I 0.060 H 0.067 H 0.070 H 0.022 0.013       COAGS:  Recent Labs   Lab 03/20/20  0014 11/24/20  1519   INR 1.0 1.0       LIPIDS/LFTS:  Recent Labs   Lab 03/20/20 0014 11/24/20  1519 11/25/20  0433 11/26/20  0638 12/04/20  2323   Cholesterol  --  98 L  --   --   --    Triglycerides  --  234 H  --   --   --    HDL  --  36 L  --   --   --    LDL Cholesterol  --  15.2 L  --   --   --    Non-HDL Cholesterol  --  62  --   --   --    AST 26 47 H 47 H 43 H 29   ALT 24 28 27 23 26       BNP:  Recent Labs   Lab 03/20/20 0014 11/24/20  1519 12/04/20  2323    H 97 868 H       TSH:  Recent Labs   Lab 11/24/20 1519   TSH 1.539       Free T4:        Diagnostic Results:  ECG (personally reviewed and interpreted tracing(s)):  11/24/20 1516 SR 82, 1st deg AVB, IVCD  12/5/20 0030 SR vs ?2:1 , IVCD  12/5/20 1113 tele AF 90s    Chest X-Ray (personally reviewed and interpreted image(s)): 12/5/20 ?CHF vs PNA    Echo: 11/25/20  · The left ventricle is normal in size with normal systolic function. The estimated ejection fraction is 55%.  · There is left ventricular concentric hypertrophy.  · Moderate left atrial enlargement.  · Normal left ventricular diastolic function.  · Normal right ventricular size with normal right ventricular systolic function.  · Mild-to-moderate aortic valve stenosis.  · Aortic valve area is 1.61 cm2; peak velocity is 3.23 m/s; mean gradient is 25 mmHg.  · Moderate aortic regurgitation.  · Mild mitral regurgitation.  · No pulmonary hypertension    Stress Test: L MPi 11/25/20    Normal myocardial perfusion scan. There is no evidence of myocardial ischemia or infarction.    There is a  moderate intensity  fixed defect in the inferior wall of the left ventricle secondary to diaphragm attenuation.    Gated perfusion images showed an ejection fraction of 48% post stress.    There is normal wall motion post stress.    The EKG portion of this study is abnormal but not diagnostic.    There were no arrhythmias during stress.          Assessment and Plan:     * Acute hypoxemic respiratory failure  Mgmt per IM  Agree with lasix for vol overload  Atrial arrhythmia not helping  Cont efforts at rate control    Paroxysmal atrial fibrillation  Newly noted this admission  HR controlled  CHADSVASC 3  Stop ASA  Start eliquis 5mg bid  Plan MARIA/DCCV on Monday 12/7/20    Risks, benefits and alternatives of the MARIA/Cardioversion procedure were discussed with the patient including throat irritation, aspiration, anesthetic complications, esophageal irritation/perforation, skin irritation, arrhythmia, etc.  Patient understands and agrees to proceed.        Essential hypertension  Cont med rx    Hyperlipidemia  Cont statin    Type 2 diabetes mellitus, uncontrolled  Per IM    Acute on chronic diastolic congestive heart failure  Recent cardiac workup (echo/MPI 11/2020) normal.  Mild to mod AS noted, EF normal.  No need for repeat isch eval    CKD (chronic kidney disease), stage III  Creat stable        VTE Risk Mitigation (From admission, onward)         Ordered     IP VTE HIGH RISK PATIENT  Once      12/05/20 0556     Place sequential compression device  Until discontinued      12/05/20 0556                Thank you for your consult. I will follow-up with patient. Please contact us if you have any additional questions.    Indra Foote MD  Cardiology   [facility]

## 2020-12-05 NOTE — ED PROVIDER NOTES
"Encounter Date: 12/4/2020       History     Chief Complaint   Patient presents with    Cough     x1 week. "I cough so much, it makes my chest hurt". Denies fever     Patient presents for evaluation of chest and abdominal pain associated with coughing.  Patient states Min coughing for approximately a week.  He states it hurts his chest upper abdomen when he coughs.  He states cough is nonproductive.  He admits to mild wheezing.  Chest pain is not present when he is not coughing.  No pain with breathing.  No back pain.  No nausea or vomiting.  No diarrhea.  No fever.  Patient denies leg swelling or calf pain.  Patient was recently admitted for chest pain to rule out acute coronary syndrome.  He had nuclear stress test that was normal and echocardiogram that was normal.  Patient has history of alcohol abuse.  He was treated for hypokalemia and hypomagnesemia during his admission.  He had pleuritic pain at that time.  D-dimer that time was 0.51.  Troponin was mildly elevated but stable.  Patient's history of renal dysfunction.  There was no evidence of pneumonia.  Patient states that the pain he was having during the admission has just gotten worse since he left.  COVID testing was negative during recent admission.        Review of patient's allergies indicates:  No Known Allergies  Past Medical History:   Diagnosis Date    Acute congestive heart failure 3/20/2020    Alcohol abuse     Arthritis     Coronary artery disease     Diabetes mellitus     Hyperlipemia     Hypertension     Rhabdomyolysis      Past Surgical History:   Procedure Laterality Date    EYE SURGERY      VASCULAR SURGERY       Family History   Problem Relation Age of Onset    Hypertension Mother     Diabetes Mother     Diabetes Father     Hypertension Father     Diabetes Sister     Hypertension Sister      Social History     Tobacco Use    Smoking status: Never Smoker    Smokeless tobacco: Never Used   Substance Use Topics    Alcohol " use: Yes     Alcohol/week: 4.0 standard drinks     Types: 4 Cans of beer per week    Drug use: No     Review of Systems   Constitutional: Negative for chills, diaphoresis and fever.   HENT: Negative for congestion and sore throat.    Eyes: Negative.  Negative for visual disturbance.   Respiratory: Positive for cough. Negative for shortness of breath.    Cardiovascular: Positive for chest pain. Negative for palpitations and leg swelling.   Gastrointestinal: Positive for abdominal pain. Negative for blood in stool, diarrhea, nausea and vomiting.        NO melena or rectal bleeding   Genitourinary: Negative for dysuria, flank pain and frequency.   Musculoskeletal: Negative for back pain and neck pain.   Skin: Negative for rash.   Neurological: Negative for dizziness, syncope, speech difficulty, weakness, numbness and headaches.        No numbness   Psychiatric/Behavioral: Negative for confusion.   All other systems reviewed and are negative.      Physical Exam     Initial Vitals [12/04/20 2245]   BP Pulse Resp Temp SpO2   130/88 (!) 120 18 99.4 °F (37.4 °C) 97 %      MAP       --         Physical Exam    Nursing note and vitals reviewed.  Constitutional: He appears well-developed and well-nourished. He is not diaphoretic. No distress.   HENT:   Head: Normocephalic and atraumatic.   Right Ear: External ear normal.   Left Ear: External ear normal.   Nose: Nose normal.   Eyes: Conjunctivae and EOM are normal. Pupils are equal, round, and reactive to light. Right eye exhibits no discharge. Left eye exhibits no discharge. No scleral icterus.   Neck: Neck supple. No thyromegaly present. No tracheal deviation present. No JVD present.   Cardiovascular: Regular rhythm and normal heart sounds.   No murmur heard.  Tachycardic   Pulmonary/Chest: No stridor. No respiratory distress. He has no wheezes. He has rhonchi (Bilateral.  More pronounced on the left.). He has rales (Left posterior lung field, coarse). He exhibits no  tenderness.   Abdominal: Soft. He exhibits no distension. There is no abdominal tenderness. There is no rebound and no guarding.   Musculoskeletal: Normal range of motion. No tenderness or edema.      Comments: No calf tenderness.   Neurological: He is alert and oriented to person, place, and time. He has normal strength. No cranial nerve deficit or sensory deficit. GCS score is 15. GCS eye subscore is 4. GCS verbal subscore is 5. GCS motor subscore is 6.   Skin: Skin is warm and dry. No rash noted. No pallor.   Psychiatric: He has a normal mood and affect. His behavior is normal. Judgment and thought content normal.         ED Course   Procedures  Labs Reviewed   CBC W/ AUTO DIFFERENTIAL - Abnormal; Notable for the following components:       Result Value    RBC 3.25 (*)     Hemoglobin 9.2 (*)     Hematocrit 27.2 (*)     MPV 9.0 (*)     Mono # 1.1 (*)     Lymph % 14.8 (*)     All other components within normal limits   COMPREHENSIVE METABOLIC PANEL - Abnormal; Notable for the following components:    Potassium 2.6 (*)     Glucose 224 (*)     Creatinine 1.7 (*)     Calcium 8.1 (*)     Albumin 3.0 (*)     eGFR if  46 (*)     eGFR if non  40 (*)     All other components within normal limits    Narrative:      Potassium critical result(s) called and verbal readback obtained   from Emily Whitehead by SMITH 12/04/2020 23:56   D DIMER, QUANTITATIVE - Abnormal; Notable for the following components:    D-Dimer 1.67 (*)     All other components within normal limits   MAGNESIUM - Abnormal; Notable for the following components:    Magnesium 1.4 (*)     All other components within normal limits   B-TYPE NATRIURETIC PEPTIDE - Abnormal; Notable for the following components:     (*)     All other components within normal limits   TROPONIN I   SARS-COV-2 RDRP GENE     EKG Readings: (Independently Interpreted)   Initial Reading: No STEMI. Rhythm: Sinus Tachycardia. Heart Rate: 123. Ectopy: No  Ectopy. ST Segments: Non-Specific ST Segment Depression. T Waves Flipped: II, III, AVF, V5 and V6. Axis: Normal.   Nonspecific intraventricular conduction delay.  Nonspecific ST changes and T-wave inversions unchanged from 11/25/2020.  Heart rate is increased.       Imaging Results          CTA Chest Non-Coronary (PE Study) (Final result)  Result time 12/05/20 02:04:21    Final result by Mine Dasilva MD (12/05/20 02:04:21)                 Impression:      1. No evidence of PE.  2. Small bilateral pleural effusions with mild dependent consolidation/compressive atelectasis within the lower lobes.  3. Patchy multifocal opacities within the right lower lobe, nonspecific but may reflect infectious or inflammatory process.  4. Nonspecific bilateral peribronchial thickening, more pronounced within the lower lobes.  5. Additional findings as detailed above.      Electronically signed by: Mine Dasilva MD  Date:    12/05/2020  Time:    02:04             Narrative:    EXAMINATION:  CTA CHEST NON CORONARY    CLINICAL HISTORY:  PE suspected, intermediate prob, positive D-dimer;    TECHNIQUE:  Low dose axial images, sagittal and coronal reformations were obtained from the thoracic inlet to the lung bases following the IV administration of 75 mL of Omnipaque 350.  Contrast timing was optimized to evaluate the pulmonary arteries.  MIP images were performed.    COMPARISON:  None    FINDINGS:  Structures at the base of the neck are unremarkable.  Aorta is non-aneurysmal.  Heart is enlarged without pericardial effusion.  There is mild coronary artery calcification.  Aortic valve calcification is also seen.  No intraluminal filling defects within the pulmonary arteries to suggest pulmonary thromboembolism.   No evidence of mediastinal lymphadenopathy.  Prominent bilateral axillary lymph nodes are seen with nonspecific mild edema and stranding seen involving the bilateral axillary regions, left greater than right.  The esophagus  is unremarkable along its course.    The trachea and bronchi are patent.  The lungs are symmetrically expanded.  Small bilateral pleural effusions are seen resulting in mild volume loss with mild dependent consolidation/compressive atelectasis within the lower lobes.  There is bilateral peribronchial thickening seen prominent within the lower lobes.  Multifocal opacities are seen within the right lower lobe.    The visualized abdominal structures are unremarkable.  Osseous structures demonstrate degenerative change.                               X-Ray Chest AP Portable (Final result)  Result time 12/05/20 00:16:27    Final result by Wilder Amezquita DO (12/05/20 00:16:27)                 Impression:      Bilateral interstitial opacities, likely representing mild edema or atypical infection.      Electronically signed by: Wilder Amezquita  Date:    12/05/2020  Time:    00:16             Narrative:    EXAMINATION:  XR CHEST AP PORTABLE    CLINICAL HISTORY:  Chest Pain.    TECHNIQUE:  Single frontal view of the chest was performed.    COMPARISON:  Multiple prior radiographs of the chest, most recent from 11/24/2020.    FINDINGS:  The lungs are hypoexpanded.  There is pulmonary vascular congestion and perihilar prominence bilaterally.  The pleural spaces are clear.  The cardiac silhouette is borderline enlarged.  The visualized osseous structures are intact and demonstrate degenerative changes.                                 Medical Decision Making:   Initial Assessment:   Patient presents for evaluation of chest pain abdominal pain associated with nonproductive cough.  Patient denies shortness of breath.  Admits to wheezing.  There are scattered rhonchi and crackles on his pulmonary exam.  Most prominent in the left posterior lung field.  Patient's oxygen saturations are normal.  Patient is afebrile.  Patient is tachycardic.  No clinical evidence of DVT.  Patient recently admitted for chest pain rule out for acute  coronary syndrome.  Repeat cardiac workup.  Will check D-dimer again.  Will get chest x-ray.  Will repeat COVID.  Will provide symptomatic relief.  Will reassess.  Differential Diagnosis:   Bronchitis, pneumonia, COVID, pulmonary embolism, CHF, chest wall pain, GERD  Clinical Tests:   Lab Tests: Ordered and Reviewed  The following lab test(s) were unremarkable: CBC, CMP, Troponin and D-Dimer  Radiological Study: Ordered and Reviewed  Medical Tests: Ordered and Reviewed  ED Management:  0030:  This has ischemia on initial EKG.  Patient is tachycardic with compared to previous.  No significant change in QRS complex or QT segments.  Initial rhythm difficult to determine.  P-waves were difficult to visualize.  May represent sinus tach versus a flutter.  He was monitored for improvement no significant proven in rate and no variation rate.  Repeat EKG more consistent with a flutter redness sinus tachycardia.  Lab work shows recurrence hypokalemia.  Magnesium is pending.  Renal function is at baseline.  Troponin is not elevated.  D-dimer is more elevated than previous.  Will obtain CT of the chest rule out pulmonary embolism is etiology of tachycardia and cough.  Chest x-ray shows evidence of interstitial edema.  Recent echo did not show a decreased EF or diastolic dysfunction but, previous echo earlier this year showed grade 2 diastolic dysfunction with mild depression and EF.  Will give Lasix.  Will give supplemental potassium.  Will give diltiazem and reassess rhythm.    0230:  Rhythm improved after 2 doses of IV diltiazem and p.o. diltiazem.  Patient diuresing after IV Lasix.  Patient's O2 requirement is increased.  Patient current on 40% Venti mask.  CT negative for pulmonary embolism.  Findings consistent with CHF.  Will admit for atrial fibrillation with RVR and hypoxemia secondary to pulmonary edema.  Discussed plan with patient.  Patient also given magnesium for hypomagnesemia.              Attending Attestation:          Attending Critical Care:   Critical Care Times:   Direct Patient Care (initial evaluation, reassessments, and time considering the case)................................................................15 minutes.   Additional History from reviewing old medical records or taking additional history from the family, EMS, PCP, etc.......................10 minutes.   Ordering, Reviewing, and Interpreting Diagnostic Studies...............................................................................................................10 minutes.   Documentation..................................................................................................................................................................................10 minutes.   ==============================================================  · Total Critical Care Time - exclusive of procedural time: 45 minutes.  ==============================================================  Critical care was necessary to treat or prevent imminent or life-threatening deterioration of the following conditions: congestive heart failure and cardiac arrhythmia.   The following critical care procedures were done by me (see procedure notes): pulse oximetry.   Critical care was time spent personally by me on the following activities: obtaining history from patient or relative, examination of patient, review of old charts, ordering lab, x-rays, and/or EKG, ordering and performing treatments and interventions, discussion with consultants and re-evaluation of patient's conition.   Critical Care Condition: potentially life-threatening                         Clinical Impression:       ICD-10-CM ICD-9-CM   1. Atrial flutter, unspecified type  I48.92 427.32   2. Chest pain  R07.9 786.50   3. Tachycardia  R00.0 785.0   4. Acute on chronic diastolic congestive heart failure  I50.33 428.33     428.0   5. Hypoxemia  R09.02 799.02   6. Shortness of breath  R06.02 786.05                       Disposition:   Disposition: Admitted  Condition: Serious     ED Disposition Condition    Admit                             Ralf Luna MD  12/05/20 0233

## 2020-12-05 NOTE — ED NOTES
While on 2L/min of O2 via NC, patient's O2 was 91%. I increased the O2 administration to 4L/min, and the O2 rate increased to 96%. NAD noted. VSS. Will continue to monitor.

## 2020-12-05 NOTE — H&P
"[facility] Medicine  History & Physical    Patient Name: Chato Bowie  MRN: [id number]  Admission Date: 12/4/2020  Attending Physician: Sea Phelan MD   Primary Care Provider: [facility]         Patient information was obtained from patient, past medical records and ER records.     Subjective:     Principal Problem:Acute hypoxemic respiratory failure    Chief Complaint:   Chief Complaint   Patient presents with    Cough     x1 week. "I cough so much, it makes my chest hurt". Denies fever        HPI: Mr. Marie is a 70-year-old male with a past medical history of coronary artery disease, hypertension, diabetes, BPH, alcohol use who presents after he has been having a cough for 1 week.  The patient was recently admitted for chest pain and underwent nuclear stress test.  She had a fixed defect the patient was discharged with follow-up.  The patient states that ever since he was discharged, he has been having a dry cough and it has gotten so bad that it makes his chest hurt.  He is not producing any sputum.  Otherwise, he does not have chest pain on exertion.  Only when he coughs.  He denies fevers or chills but endorses shortness of breath with exertion in anything that he does.  He is not able to lie flat.  His lower extremities do have some swelling he states that this is his baseline and not worse than usual.  He denies smoking and denies any history of lung disease.  However he does believe that he may have some sort of asthma. The patient states he has not had any alcohol since he was discharged last week.    Past Medical History:   Diagnosis Date    Acute congestive heart failure 3/20/2020    Alcohol abuse     Arthritis     Coronary artery disease     Diabetes mellitus     Hyperlipemia     Hypertension     Rhabdomyolysis        Past Surgical History:   Procedure Laterality Date    EYE SURGERY      VASCULAR SURGERY         Review of patient's allergies " indicates:  No Known Allergies    No current facility-administered medications on file prior to encounter.      Current Outpatient Medications on File Prior to Encounter   Medication Sig    amlodipine (NORVASC) 10 MG tablet Take 10 mg by mouth once daily.    aspirin (ECOTRIN) 81 MG EC tablet Take 1 tablet (81 mg total) by mouth once daily.    aspirin-calcium carbonate 81 mg-300 mg calcium(777 mg) Tab Take 81 mg by mouth.    atorvastatin (LIPITOR) 80 MG tablet Take 80 mg by mouth every evening.    atropine 1% (ISOPTO ATROPINE) 1 % Drop INT 1 GTT INTO OD QD FOR 1 WK    carvedilol (COREG) 12.5 MG tablet Take 1 tablet (12.5 mg total) by mouth 2 (two) times daily. (Patient taking differently: Take 25 mg by mouth 2 (two) times daily. )    colchicine (COLCRYS) 0.6 mg tablet Take 1 tablet (0.6 mg total) by mouth once daily. for 14 days    DUREZOL 0.05 % Drop ophthalmic solution INT 1 GTT INTO OD FID FOR 3 WEEKS    fenofibrate 160 MG Tab Take 160 mg by mouth.    folic acid (FOLVITE) 1 MG tablet Take 1 mg by mouth.    furosemide (LASIX) 40 MG tablet Take 1 tablet (40 mg total) by mouth once daily.    hydrALAZINE (APRESOLINE) 50 MG tablet Take 1 tablet (50 mg total) by mouth 3 (three) times daily.    insulin syringe-needle U-100 1 mL 31 gauge x 5/16 Syrg USE TO INJECT INSULIN TWICE A DAY    lansoprazole (PREVACID) 30 MG capsule Take 30 mg by mouth once daily.    magnesium oxide (MAG-OX) 400 mg (241.3 mg magnesium) tablet Take 800 mg by mouth.    multivitamin Tab Take 1 tablet by mouth once daily.    NOVOLOG MIX 70-30 U-100 INSULN 100 unit/mL (70-30) Soln Inject 10 Units into the skin before meals as needed.    pantoprazole (PROTONIX) 40 MG tablet Take 1 tablet (40 mg total) by mouth 2 (two) times daily.    rosuvastatin (CRESTOR) 40 MG Tab Take 10 mg by mouth once daily.    sildenafil (VIAGRA) 100 MG tablet Take 1 tablet (100 mg total) by mouth daily as needed for Erectile Dysfunction. *generic tabs*     tamsulosin (FLOMAX) 0.4 mg Cap Take 1 capsule (0.4 mg total) by mouth once daily.    terbinafine HCl (LAMISIL) 250 mg tablet     thiamine 100 MG tablet Take 1 tablet (100 mg total) by mouth once daily.     Family History     Problem Relation (Age of Onset)    Diabetes Mother, Father, Sister    Hypertension Mother, Father, Sister        Tobacco Use    Smoking status: Never Smoker    Smokeless tobacco: Never Used   Substance and Sexual Activity    Alcohol use: Yes     Alcohol/week: 4.0 standard drinks     Types: 4 Cans of beer per week    Drug use: No    Sexual activity: Not on file     Review of Systems   Constitutional: Negative for chills and fever.   HENT: Negative for congestion and sore throat.    Respiratory: Positive for cough, shortness of breath and wheezing.    Cardiovascular: Positive for chest pain. Negative for palpitations and leg swelling.   Gastrointestinal: Negative for abdominal pain, constipation, diarrhea, nausea and vomiting.   Genitourinary: Negative for dysuria.   Musculoskeletal: Negative for arthralgias and back pain.   Neurological: Negative for dizziness, numbness and headaches.   Psychiatric/Behavioral: Negative for agitation and confusion.     Objective:     Vital Signs (Most Recent):  Temp: 97.9 °F (36.6 °C) (12/05/20 0728)  Pulse: 65 (12/05/20 0728)  Resp: (!) 22 (12/05/20 0728)  BP: 116/71 (12/05/20 0728)  SpO2: (!) 88 % (12/05/20 0824) Vital Signs (24h Range):  Temp:  [97.9 °F (36.6 °C)-99.4 °F (37.4 °C)] 97.9 °F (36.6 °C)  Pulse:  [] 65  Resp:  [18-30] 22  SpO2:  [71 %-97 %] 88 %  BP: (116-151)/() 116/71     Weight: 103.9 kg (229 lb 0.9 oz)  Body mass index is 31.95 kg/m².    Physical Exam  Vitals signs and nursing note reviewed.   Constitutional:       General: He is not in acute distress.     Appearance: Normal appearance. He is not ill-appearing.   HENT:      Head: Normocephalic.      Nose: No congestion.      Mouth/Throat:      Pharynx: Oropharynx is clear.    Eyes:      General: No scleral icterus.     Conjunctiva/sclera: Conjunctivae normal.   Neck:      Musculoskeletal: Neck supple.   Cardiovascular:      Rate and Rhythm: Normal rate and regular rhythm.      Pulses: Normal pulses.   Pulmonary:      Effort: Pulmonary effort is normal.      Comments: Mild crackles at bases and faint expiratory wheezing noted. No rhonchi appreciated  Abdominal:      General: Bowel sounds are normal. There is no distension.      Tenderness: There is no abdominal tenderness.   Musculoskeletal: Normal range of motion.      Comments: Mild nonpitting edema to b/l lower extremities   Skin:     General: Skin is warm and dry.   Neurological:      General: No focal deficit present.      Mental Status: He is alert and oriented to person, place, and time.   Psychiatric:         Mood and Affect: Mood normal.         Thought Content: Thought content normal.         Judgment: Judgment normal.             Significant Labs: All pertinent labs within the past 24 hours have been reviewed.    Significant Imaging: I have reviewed all pertinent imaging results/findings within the past 24 hours.    Assessment/Plan:     * Acute hypoxemic respiratory failure  Patient presented with dry cough and persistent shortness of breath.  Became hypoxic and went for CTA which showed no evidence of a pulmonary embolism but noted patchy multifocal opacities within the right lower lobe as well as small bilateral pleural effusions with mild dependent consolidations loss compressive atelectasis  Patient does not appear in distress but is currently a non-rebreather mask.  Lung exam were consistent with volume overload and some expiratory wheezing  There is concern for some sort of infectious process.  Start patient on doxycycline and check procalcitonin.  Will continue with IV diuresis and supplemental oxygen to wean as tolerated.  COVID negative.  Also with peribronchial thickening which is nonspecific.  Will give nebulizers  for shortness of breath and wheezing.      Other chest pain  Patient describes his chest pain is only when he coughs which has been going on for 1 week.  He also describes the pain in his abdomen.  Denies chest pain on exertion, EKG reviewed troponin downtrending.  Do not suspect ACS at this time.  Continue with aspirin and statin and symptom relief      Pneumonia of right lower lobe due to infectious organism  Patient gives a history a dry cough with shortness of breath and is now hypoxic.  He does have evidence of volume overload however on CT a it showed that he had multifocal patchy opacities in right lower.  More likely to be atypical in nature given his symptoms.  COVID negative.  Will treat with doxycycline and check procalcitonin.  Wean off oxygen as tolerated      Aortic stenosis, moderate  Noted on previous echo      CKD (chronic kidney disease), stage III  Appears to be at baseline.  Will continue to monitor      Acute on chronic combined systolic and diastolic congestive heart failure  Patient has a history of low normal EF and diastolic dysfunction.  Last echocardiogram 1 week ago showed improved EF and no diastolic heart dysfunction.  Concern for some mild volume overload at this time.  Will continue with IV diuresis to see if this will improve his symptoms.  Patient reports compliance with his home medication, resume carvedilol at a lower dose.      Alcohol abuse  Patient has a history of alcohol abuse however denies any recent used in the past week.  Not showing any signs or symptoms of withdrawal at this time.  Continue with thiamine and multivitamin      Hypokalemia  Will replace and monitor      GERD (gastroesophageal reflux disease)  Not on any medications at home.  Will monitor for now.      Anemia of chronic disease  Hemoglobin appears slightly lower than on previous admissions.  No evidence of bleeding at this time.  Will continue diuresis and monitor.      Type 2 diabetes mellitus,  uncontrolled  On NovoLog at home.  Will continue with a diabetic diet and sliding scale for now and adjust as needed.      Hyperlipidemia  Continue with home statin      Essential hypertension  Relatively normotensive at this time.  Restarted home carvedilol at a lower dose.  Will continue to monitor.      CAD (coronary artery disease)  History of coronary artery disease.  Continue with aspirin statin      Tachycardia  EKG reviewed and noted wide complex tachycardia, not consistent VT.  Reviewed older EKGs and there are changes.  Currently, patient appears to be in atrial flutter with an irregular rhythm on telemetry but rate is 80s. Will continue to monitor, patient states he's been compliant with his home medications.   Cardiology consulted for evaluation of ECG changes.        VTE Risk Mitigation (From admission, onward)         Ordered     IP VTE HIGH RISK PATIENT  Once      12/05/20 0556     Place sequential compression device  Until discontinued      12/05/20 0556                   Sea Phelan MD  Department of Hospital Medicine   [facility]

## 2020-12-05 NOTE — PLAN OF CARE
12/05/20 0943   Discharge Assessment   Assessment Type Discharge Planning Assessment   Confirmed/corrected address and phone number on facesheet? Yes   Assessment information obtained from? Patient;Medical Record   Communicated expected length of stay with patient/caregiver no   Prior to hospitilization cognitive status: Alert/Oriented   Prior to hospitalization functional status: Independent;Assistive Equipment  (Cane)   Current cognitive status: Alert/Oriented   Current Functional Status: Independent;Assistive Equipment  (Cane)   Facility Arrived From: Home   Lives With alone   Able to Return to Prior Arrangements yes   Is patient able to care for self after discharge? Yes   Who are your caregiver(s) and their phone number(s)? Janelle   Patient's perception of discharge disposition home or selfcare   Readmission Within the Last 30 Days planned readmission   If yes, most recent facility name: [facility]   Patient currently being followed by outpatient case management? No   Patient currently receives any other outside agency services? No   Equipment Currently Used at Home cane, straight   Do you have any problems affording any of your prescribed medications? No   Is the patient taking medications as prescribed? yes   Does the patient have transportation home? Yes   Transportation Anticipated family or friend will provide;public transportation   Does the patient receive services at the [facility]? No   Discharge Plan A Home   Discharge Plan B Other  (TBD)   DME Needed Upon Discharge  other (see comments)  (TBD)   Patient/Family in Agreement with Plan yes   Readmission Questionnaire   At the time of your discharge, did someone talk to you about what your health problems were? Yes   At the time of discharge, did someone talk to you about what to watch out for regarding worsening of your health problem? Yes   At the time of discharge, did someone talk to you about what to do if you experienced worsening of your  health problem? Yes   At the time of discharge, did someone talk to you about which medication to take when you left the hospital and which ones to stop taking? Yes   At the time of discharge, did someone talk to you about when and where to follow up with a doctor after you left the hospital? Yes   What do you believe caused you to be sick enough to be re-admitted? Respiratory problems   How often do you need to have someone help you when you read instructions, pamphlets, or other written material from your doctor or pharmacy? Sometimes   Do you have problems taking your medications as prescribed? No   Do you have any problems affording any of  your prescribed medications? No   Do you have problems obtaining/receiving your medications? No   Does the patient have transportation to healthcare appointments? Yes   Living Arrangements apartment   Does the patient have family/friends to help with healtcare needs after discharge? yes   Does your caregiver provide all the help you need? Yes   If no, what kind of help do you need at home? Transport; not much other help needed   SW Role explained to patient; two patient identifiers recognized; SW contact information placed on Communication board. Discussed patient managing health care at home; determined who would be helping patient at home with recovery: Dulce- will help with recovery at home.    PCP: Irlanda Valenzuela     Extended Emergency Contact Information  Primary Emergency Contact: Dulce Ramirez   [facility]  Home Phone: [phone]  Relation: Sister [id number] - [place], [place] - [address]  [address]  [address]  [facility] [address]  Phone: [phone] Fax: [phone]    [facility] [address] [place], [place] - [address]  [address] [place] [address]  Phone: [phone] Fax: [phone]     Payor: [id number] MANAGED MEDICARE / Plan: [employer] MEDICARE HMO / Product  Type: Capitation /

## 2020-12-05 NOTE — ASSESSMENT & PLAN NOTE
Patient describes his chest pain is only when he coughs which has been going on for 1 week.  He also describes the pain in his abdomen.  Denies chest pain on exertion, EKG reviewed troponin downtrending.  Do not suspect ACS at this time.  Continue with aspirin and statin and symptom relief

## 2020-12-05 NOTE — ASSESSMENT & PLAN NOTE
Mgmt per IM  Agree with lasix for vol overload  Atrial arrhythmia not helping  Cont efforts at rate control

## 2020-12-05 NOTE — ED NOTES
Pt's stats dropped to 88% while on 4L/min of O2 on NC. 8L/min of O2 was administered via non-rebreather. Stats increased to 96%.

## 2020-12-05 NOTE — ASSESSMENT & PLAN NOTE
Patient gives a history a dry cough with shortness of breath and is now hypoxic.  He does have evidence of volume overload however on CT a it showed that he had multifocal patchy opacities in right lower.  More likely to be atypical in nature given his symptoms.  COVID negative.  Will treat with doxycycline and check procalcitonin.  Wean off oxygen as tolerated

## 2020-12-05 NOTE — ED NOTES
MD Cheryl notified of patient's 145/92 BP before administration of Diltiazem. MD informed me to continue with administration.

## 2020-12-05 NOTE — ASSESSMENT & PLAN NOTE
Newly noted this admission  HR controlled  CHADSVASC 3  Stop ASA  Start eliquis 5mg bid  Plan MARIA/DCCV on Monday 12/7/20    Risks, benefits and alternatives of the MARIA/Cardioversion procedure were discussed with the patient including throat irritation, aspiration, anesthetic complications, esophageal irritation/perforation, skin irritation, arrhythmia, etc.  Patient understands and agrees to proceed.

## 2020-12-05 NOTE — HPI
Mr. Marie is a 70-year-old male with a past medical history of coronary artery disease, hypertension, diabetes, BPH, alcohol use who presents after he has been having a cough for 1 week.  The patient was recently admitted for chest pain and underwent nuclear stress test.  She had a fixed defect the patient was discharged with follow-up.  The patient states that ever since he was discharged, he has been having a dry cough and it has gotten so bad that it makes his chest hurt.  He is not producing any sputum.  Otherwise, he does not have chest pain on exertion.  Only when he coughs.  He denies fevers or chills but endorses shortness of breath with exertion in anything that he does.  He is not able to lie flat.  His lower extremities do have some swelling he states that this is his baseline and not worse than usual.  He denies smoking and denies any history of lung disease.  However he does believe that he may have some sort of asthma. The patient states he has not had any alcohol since he was discharged last week.

## 2020-12-05 NOTE — ASSESSMENT & PLAN NOTE
On NovoLog at home.  Will continue with a diabetic diet and sliding scale for now and adjust as needed.

## 2020-12-05 NOTE — SUBJECTIVE & OBJECTIVE
Past Medical History:   Diagnosis Date    Acute congestive heart failure 3/20/2020    Alcohol abuse     Arthritis     Coronary artery disease     Diabetes mellitus     Hyperlipemia     Hypertension     Rhabdomyolysis        Past Surgical History:   Procedure Laterality Date    EYE SURGERY      VASCULAR SURGERY         Review of patient's allergies indicates:  No Known Allergies    No current facility-administered medications on file prior to encounter.      Current Outpatient Medications on File Prior to Encounter   Medication Sig    amlodipine (NORVASC) 10 MG tablet Take 10 mg by mouth once daily.    aspirin (ECOTRIN) 81 MG EC tablet Take 1 tablet (81 mg total) by mouth once daily.    aspirin-calcium carbonate 81 mg-300 mg calcium(777 mg) Tab Take 81 mg by mouth.    atorvastatin (LIPITOR) 80 MG tablet Take 80 mg by mouth every evening.    atropine 1% (ISOPTO ATROPINE) 1 % Drop INT 1 GTT INTO OD QD FOR 1 WK    carvedilol (COREG) 12.5 MG tablet Take 1 tablet (12.5 mg total) by mouth 2 (two) times daily. (Patient taking differently: Take 25 mg by mouth 2 (two) times daily. )    colchicine (COLCRYS) 0.6 mg tablet Take 1 tablet (0.6 mg total) by mouth once daily. for 14 days    DUREZOL 0.05 % Drop ophthalmic solution INT 1 GTT INTO OD FID FOR 3 WEEKS    fenofibrate 160 MG Tab Take 160 mg by mouth.    folic acid (FOLVITE) 1 MG tablet Take 1 mg by mouth.    furosemide (LASIX) 40 MG tablet Take 1 tablet (40 mg total) by mouth once daily.    hydrALAZINE (APRESOLINE) 50 MG tablet Take 1 tablet (50 mg total) by mouth 3 (three) times daily.    insulin syringe-needle U-100 1 mL 31 gauge x 5/16 Syrg USE TO INJECT INSULIN TWICE A DAY    lansoprazole (PREVACID) 30 MG capsule Take 30 mg by mouth once daily.    magnesium oxide (MAG-OX) 400 mg (241.3 mg magnesium) tablet Take 800 mg by mouth.    multivitamin Tab Take 1 tablet by mouth once daily.    NOVOLOG MIX 70-30 U-100 INSULN 100 unit/mL (70-30) Soln  Inject 10 Units into the skin before meals as needed.    pantoprazole (PROTONIX) 40 MG tablet Take 1 tablet (40 mg total) by mouth 2 (two) times daily.    rosuvastatin (CRESTOR) 40 MG Tab Take 10 mg by mouth once daily.    sildenafil (VIAGRA) 100 MG tablet Take 1 tablet (100 mg total) by mouth daily as needed for Erectile Dysfunction. *generic tabs*    tamsulosin (FLOMAX) 0.4 mg Cap Take 1 capsule (0.4 mg total) by mouth once daily.    terbinafine HCl (LAMISIL) 250 mg tablet     thiamine 100 MG tablet Take 1 tablet (100 mg total) by mouth once daily.     Family History     Problem Relation (Age of Onset)    Diabetes Mother, Father, Sister    Hypertension Mother, Father, Sister        Tobacco Use    Smoking status: Never Smoker    Smokeless tobacco: Never Used   Substance and Sexual Activity    Alcohol use: Yes     Alcohol/week: 4.0 standard drinks     Types: 4 Cans of beer per week    Drug use: No    Sexual activity: Not on file     Review of Systems   Constitution: Negative for chills, diaphoresis, fever and malaise/fatigue.   HENT: Negative for nosebleeds.    Eyes: Negative for blurred vision and double vision.   Cardiovascular: Positive for chest pain (with cough). Negative for claudication, cyanosis, dyspnea on exertion, leg swelling, orthopnea, palpitations, paroxysmal nocturnal dyspnea and syncope.   Respiratory: Positive for cough and shortness of breath. Negative for wheezing.    Skin: Negative for dry skin and poor wound healing.   Musculoskeletal: Negative for back pain, joint swelling and myalgias.   Gastrointestinal: Negative for abdominal pain, nausea and vomiting.   Genitourinary: Negative for hematuria.   Neurological: Negative for dizziness, headaches, numbness, seizures and weakness.   Psychiatric/Behavioral: Negative for altered mental status and depression.     Objective:     Vital Signs (Most Recent):  Temp: 97.9 °F (36.6 °C) (12/05/20 0728)  Pulse: 87 (12/05/20 0928)  Resp: 20  (12/05/20 0928)  BP: 116/71 (12/05/20 0728)  SpO2: 97 % (12/05/20 0928) Vital Signs (24h Range):  Temp:  [97.9 °F (36.6 °C)-99.4 °F (37.4 °C)] 97.9 °F (36.6 °C)  Pulse:  [] 87  Resp:  [18-30] 20  SpO2:  [71 %-97 %] 97 %  BP: (116-151)/() 116/71     Weight: 103.9 kg (229 lb 0.9 oz)  Body mass index is 31.95 kg/m².    SpO2: 97 %  O2 Device (Oxygen Therapy): Non Rebreather Mask      Intake/Output Summary (Last 24 hours) at 12/5/2020 1110  Last data filed at 12/5/2020 0344  Gross per 24 hour   Intake 600 ml   Output --   Net 600 ml       Lines/Drains/Airways     Peripheral Intravenous Line                 Peripheral IV - Single Lumen 12/04/20 2246 18 G Left Antecubital less than 1 day                Physical Exam   Constitutional: He is oriented to person, place, and time. He appears well-developed and well-nourished. No distress.   HENT:   Head: Normocephalic and atraumatic.   Eyes: Pupils are equal, round, and reactive to light. Conjunctivae and EOM are normal. No scleral icterus.   Neck: Normal range of motion. Neck supple. No JVD present. No tracheal deviation present. No thyromegaly present.   Cardiovascular: Normal rate, S1 normal, S2 normal and intact distal pulses. An irregularly irregular rhythm present. Exam reveals no gallop and no friction rub.   Murmur heard.   Systolic murmur is present with a grade of 1/6.  Pulmonary/Chest: Effort normal and breath sounds normal. No respiratory distress. He has no wheezes. He has no rales. He exhibits no tenderness.   Abdominal: Soft. He exhibits no distension.   Musculoskeletal:         General: No edema.   Neurological: He is alert and oriented to person, place, and time. He has normal strength. No cranial nerve deficit.   Skin: Skin is warm and dry. No rash noted. He is not diaphoretic.   Psychiatric: He has a normal mood and affect. His behavior is normal.       Current Medications:   aspirin  81 mg Oral Daily    atorvastatin  80 mg Oral QHS     carvediloL  3.125 mg Oral BID    doxycycline (VIBRAMYCIN) IVPB  100 mg Intravenous Q12H    fenofibrate  160 mg Oral Daily    furosemide (LASIX) IV  40 mg Intravenous BID    levalbuterol  0.63 mg Nebulization Q8H    magnesium oxide  400 mg Oral Daily    multivitamin  1 tablet Oral Daily    thiamine  100 mg Oral Daily       benzonatate, dextrose 50%, dextrose 50%, glucagon (human recombinant), glucose, glucose, guaifenesin 100 mg/5 ml, insulin aspart U-100, sodium chloride 0.9%    Laboratory (all labs reviewed):  CBC:  Recent Labs   Lab 11/25/20  0433 11/26/20  0217 11/27/20  0540 12/04/20  2323 12/05/20  0645   WBC 7.08 6.48 7.48 7.58 7.21   Hemoglobin 11.9 L 10.4 L 11.0 L 9.2 L 9.9 L   Hematocrit 33.8 L 28.8 L 30.9 L 27.2 L 29.6 L   Platelets 197 177 196 243 278       CHEMISTRIES:  Recent Labs   Lab 11/24/20 2001 11/24/20  2157  11/26/20  0217  11/26/20  1013 11/26/20  1440 11/27/20  0540 12/04/20  2323 12/05/20  0645   Glucose  --   --    < > 295 H   < > 295 H 318 H 241 H 224 H 195 H   Sodium  --   --    < > 135 L   < > 131 L 134 L 138 139 138   Potassium 2.4 LL 2.3 LL   < > 2.6 LL   < > 3.6 3.6 3.1 L 2.6 LL 3.4 L   BUN  --   --    < > 21   < > 19 18 16 19 19   Creatinine  --   --    < > 2.0 H   < > 1.8 H 1.7 H 1.6 H 1.7 H 1.7 H   eGFR if   --   --    < > 38 A   < > 43 A 46 A 50 A 46 A 46 A   eGFR if non   --   --    < > 33 A   < > 37 A 40 A 43 A 40 A 40 A   Calcium  --   --    < > 7.8 L   < > 7.9 L 8.0 L 8.1 L 8.1 L 8.4 L   Magnesium 2.1 1.8  --  1.5 L  --   --   --  1.8 1.4 L  --     < > = values in this interval not displayed.       CARDIAC BIOMARKERS:  Recent Labs   Lab 11/24/20 2001 11/24/20 2157 11/25/20 0655 12/04/20 2323 12/05/20  0645    H  --   --   --   --    Troponin I 0.060 H 0.067 H 0.070 H 0.022 0.013       COAGS:  Recent Labs   Lab 03/20/20 0014 11/24/20  1519   INR 1.0 1.0       LIPIDS/LFTS:  Recent Labs   Lab 03/20/20 0014 11/24/20  1519  11/25/20  0433 11/26/20  0638 12/04/20  2323   Cholesterol  --  98 L  --   --   --    Triglycerides  --  234 H  --   --   --    HDL  --  36 L  --   --   --    LDL Cholesterol  --  15.2 L  --   --   --    Non-HDL Cholesterol  --  62  --   --   --    AST 26 47 H 47 H 43 H 29   ALT 24 28 27 23 26       BNP:  Recent Labs   Lab 03/20/20  0014 11/24/20  1519 12/04/20  2323    H 97 868 H       TSH:  Recent Labs   Lab 11/24/20  1519   TSH 1.539       Free T4:        Diagnostic Results:  ECG (personally reviewed and interpreted tracing(s)):  11/24/20 1516 SR 82, 1st deg AVB, IVCD  12/5/20 0030 SR vs ?2:1 , IVCD  12/5/20 1113 tele AF 90s    Chest X-Ray (personally reviewed and interpreted image(s)): 12/5/20 ?CHF vs PNA    Echo: 11/25/20  · The left ventricle is normal in size with normal systolic function. The estimated ejection fraction is 55%.  · There is left ventricular concentric hypertrophy.  · Moderate left atrial enlargement.  · Normal left ventricular diastolic function.  · Normal right ventricular size with normal right ventricular systolic function.  · Mild-to-moderate aortic valve stenosis.  · Aortic valve area is 1.61 cm2; peak velocity is 3.23 m/s; mean gradient is 25 mmHg.  · Moderate aortic regurgitation.  · Mild mitral regurgitation.  · No pulmonary hypertension    Stress Test: L MPi 11/25/20    Normal myocardial perfusion scan. There is no evidence of myocardial ischemia or infarction.    There is a  moderate intensity fixed defect in the inferior wall of the left ventricle secondary to diaphragm attenuation.    Gated perfusion images showed an ejection fraction of 48% post stress.    There is normal wall motion post stress.    The EKG portion of this study is abnormal but not diagnostic.    There were no arrhythmias during stress.

## 2020-12-05 NOTE — ASSESSMENT & PLAN NOTE
Patient has a history of alcohol abuse however denies any recent used in the past week.  Not showing any signs or symptoms of withdrawal at this time.  Continue with thiamine and multivitamin

## 2020-12-05 NOTE — ASSESSMENT & PLAN NOTE
EKG reviewed and noted wide complex tachycardia, not consistent VT.  Reviewed older EKGs and there are changes.  Currently, patient appears to be in atrial flutter with an irregular rhythm on telemetry but rate is 80s. Will continue to monitor, patient states he's been compliant with his home medications.   Cardiology consulted for evaluation of ECG changes.

## 2020-12-05 NOTE — ASSESSMENT & PLAN NOTE
Patient has a history of low normal EF and diastolic dysfunction.  Last echocardiogram 1 week ago showed improved EF and no diastolic heart dysfunction.  Concern for some mild volume overload at this time.  Will continue with IV diuresis to see if this will improve his symptoms.  Patient reports compliance with his home medication, resume carvedilol at a lower dose.

## 2020-12-05 NOTE — HPI
70-year-old male with a past medical history of coronary artery disease, hypertension, diabetes, BPH, alcohol use who presents after he has been having a cough for 1 week.  The patient was recently admitted for chest pain and underwent nuclear stress test.  She had a fixed defect the patient was discharged with follow-up.  The patient states that ever since he was discharged, he has been having a dry cough and it has gotten so bad that it makes his chest hurt.  He is not producing any sputum.  Otherwise, he does not have chest pain on exertion.  Only when he coughs.  He denies fevers or chills but endorses shortness of breath with exertion in anything that he does.  He is not able to lie flat.  His lower extremities do have some swelling he states that this is his baseline and not worse than usual.  He denies smoking and denies any history of lung disease.  However he does believe that he may have some sort of asthma. The patient states he has not had any alcohol since he was discharged last week.    Cardiology consulted for possible atrial arrhythmia.    Patient seen by Dr. Llamas last week with elevated troponin and ARF as well as aortic stenosis.  Nuclear stress test was negative.    The patient presents with shortness of breath respiratory phasic chest pain.  He is noted to be in a tachycardia which apparently his atrial fibrillation on telemetry on my personal review.  His EKG done earlier today suggest possible atypical flutter.  Nevertheless, the patient is unaware of the arrhythmia aside from his dyspnea.  He denies any history of stroke or TIA.  His chads Vasc score is at least 2.  I discussed the need to initiate anticoagulation and he is agreeable.  I also discussed possible MARIA cardioversion and the patient is agreeable to proceeding with the procedure.

## 2020-12-05 NOTE — ASSESSMENT & PLAN NOTE
Relatively normotensive at this time.  Restarted home carvedilol at a lower dose.  Will continue to monitor.

## 2020-12-05 NOTE — ED TRIAGE NOTES
Pt presents to the ED w/ c/o cough and midsternal chest/abd pain when he coughs x 1 week. Reports SOB, dysuria nausea and vomiting since yesterday, but denies hematuria, diarrhea or fever. NAD noted. Will continue to monitor.

## 2020-12-05 NOTE — SUBJECTIVE & OBJECTIVE
Past Medical History:   Diagnosis Date    Acute congestive heart failure 3/20/2020    Alcohol abuse     Arthritis     Coronary artery disease     Diabetes mellitus     Hyperlipemia     Hypertension     Rhabdomyolysis        Past Surgical History:   Procedure Laterality Date    EYE SURGERY      VASCULAR SURGERY         Review of patient's allergies indicates:  No Known Allergies    No current facility-administered medications on file prior to encounter.      Current Outpatient Medications on File Prior to Encounter   Medication Sig    amlodipine (NORVASC) 10 MG tablet Take 10 mg by mouth once daily.    aspirin (ECOTRIN) 81 MG EC tablet Take 1 tablet (81 mg total) by mouth once daily.    aspirin-calcium carbonate 81 mg-300 mg calcium(777 mg) Tab Take 81 mg by mouth.    atorvastatin (LIPITOR) 80 MG tablet Take 80 mg by mouth every evening.    atropine 1% (ISOPTO ATROPINE) 1 % Drop INT 1 GTT INTO OD QD FOR 1 WK    carvedilol (COREG) 12.5 MG tablet Take 1 tablet (12.5 mg total) by mouth 2 (two) times daily. (Patient taking differently: Take 25 mg by mouth 2 (two) times daily. )    colchicine (COLCRYS) 0.6 mg tablet Take 1 tablet (0.6 mg total) by mouth once daily. for 14 days    DUREZOL 0.05 % Drop ophthalmic solution INT 1 GTT INTO OD FID FOR 3 WEEKS    fenofibrate 160 MG Tab Take 160 mg by mouth.    folic acid (FOLVITE) 1 MG tablet Take 1 mg by mouth.    furosemide (LASIX) 40 MG tablet Take 1 tablet (40 mg total) by mouth once daily.    hydrALAZINE (APRESOLINE) 50 MG tablet Take 1 tablet (50 mg total) by mouth 3 (three) times daily.    insulin syringe-needle U-100 1 mL 31 gauge x 5/16 Syrg USE TO INJECT INSULIN TWICE A DAY    lansoprazole (PREVACID) 30 MG capsule Take 30 mg by mouth once daily.    magnesium oxide (MAG-OX) 400 mg (241.3 mg magnesium) tablet Take 800 mg by mouth.    multivitamin Tab Take 1 tablet by mouth once daily.    NOVOLOG MIX 70-30 U-100 INSULN 100 unit/mL (70-30) Soln  Inject 10 Units into the skin before meals as needed.    pantoprazole (PROTONIX) 40 MG tablet Take 1 tablet (40 mg total) by mouth 2 (two) times daily.    rosuvastatin (CRESTOR) 40 MG Tab Take 10 mg by mouth once daily.    sildenafil (VIAGRA) 100 MG tablet Take 1 tablet (100 mg total) by mouth daily as needed for Erectile Dysfunction. *generic tabs*    tamsulosin (FLOMAX) 0.4 mg Cap Take 1 capsule (0.4 mg total) by mouth once daily.    terbinafine HCl (LAMISIL) 250 mg tablet     thiamine 100 MG tablet Take 1 tablet (100 mg total) by mouth once daily.     Family History     Problem Relation (Age of Onset)    Diabetes Mother, Father, Sister    Hypertension Mother, Father, Sister        Tobacco Use    Smoking status: Never Smoker    Smokeless tobacco: Never Used   Substance and Sexual Activity    Alcohol use: Yes     Alcohol/week: 4.0 standard drinks     Types: 4 Cans of beer per week    Drug use: No    Sexual activity: Not on file     Review of Systems   Constitutional: Negative for chills and fever.   HENT: Negative for congestion and sore throat.    Respiratory: Positive for cough, shortness of breath and wheezing.    Cardiovascular: Positive for chest pain. Negative for palpitations and leg swelling.   Gastrointestinal: Negative for abdominal pain, constipation, diarrhea, nausea and vomiting.   Genitourinary: Negative for dysuria.   Musculoskeletal: Negative for arthralgias and back pain.   Neurological: Negative for dizziness, numbness and headaches.   Psychiatric/Behavioral: Negative for agitation and confusion.     Objective:     Vital Signs (Most Recent):  Temp: 97.9 °F (36.6 °C) (12/05/20 0728)  Pulse: 65 (12/05/20 0728)  Resp: (!) 22 (12/05/20 0728)  BP: 116/71 (12/05/20 0728)  SpO2: (!) 88 % (12/05/20 0824) Vital Signs (24h Range):  Temp:  [97.9 °F (36.6 °C)-99.4 °F (37.4 °C)] 97.9 °F (36.6 °C)  Pulse:  [] 65  Resp:  [18-30] 22  SpO2:  [71 %-97 %] 88 %  BP: (116-151)/() 116/71      Weight: 103.9 kg (229 lb 0.9 oz)  Body mass index is 31.95 kg/m².    Physical Exam  Vitals signs and nursing note reviewed.   Constitutional:       General: He is not in acute distress.     Appearance: Normal appearance. He is not ill-appearing.   HENT:      Head: Normocephalic.      Nose: No congestion.      Mouth/Throat:      Pharynx: Oropharynx is clear.   Eyes:      General: No scleral icterus.     Conjunctiva/sclera: Conjunctivae normal.   Neck:      Musculoskeletal: Neck supple.   Cardiovascular:      Rate and Rhythm: Normal rate and regular rhythm.      Pulses: Normal pulses.   Pulmonary:      Effort: Pulmonary effort is normal.      Comments: Mild crackles at bases and faint expiratory wheezing noted. No rhonchi appreciated  Abdominal:      General: Bowel sounds are normal. There is no distension.      Tenderness: There is no abdominal tenderness.   Musculoskeletal: Normal range of motion.      Comments: Mild nonpitting edema to b/l lower extremities   Skin:     General: Skin is warm and dry.   Neurological:      General: No focal deficit present.      Mental Status: He is alert and oriented to person, place, and time.   Psychiatric:         Mood and Affect: Mood normal.         Thought Content: Thought content normal.         Judgment: Judgment normal.             Significant Labs: All pertinent labs within the past 24 hours have been reviewed.    Significant Imaging: I have reviewed all pertinent imaging results/findings within the past 24 hours.

## 2020-12-05 NOTE — ASSESSMENT & PLAN NOTE
Recent cardiac workup (echo/MPI 11/2020) normal.  Mild to mod AS noted, EF normal.  No need for repeat isch eval

## 2020-12-05 NOTE — ASSESSMENT & PLAN NOTE
Patient presented with dry cough and persistent shortness of breath.  Became hypoxic and went for CTA which showed no evidence of a pulmonary embolism but noted patchy multifocal opacities within the right lower lobe as well as small bilateral pleural effusions with mild dependent consolidations loss compressive atelectasis  Patient does not appear in distress but is currently a non-rebreather mask.  Lung exam were consistent with volume overload and some expiratory wheezing  There is concern for some sort of infectious process.  Start patient on doxycycline and check procalcitonin.  Will continue with IV diuresis and supplemental oxygen to wean as tolerated.  COVID negative.  Also with peribronchial thickening which is nonspecific.  Will give nebulizers for shortness of breath and wheezing.

## 2020-12-06 LAB
ANION GAP SERPL CALC-SCNC: 12 MMOL/L (ref 8–16)
BASOPHILS # BLD AUTO: 0.08 K/UL (ref 0–0.2)
BASOPHILS NFR BLD: 0.8 % (ref 0–1.9)
BUN SERPL-MCNC: 20 MG/DL (ref 8–23)
CALCIUM SERPL-MCNC: 8.5 MG/DL (ref 8.7–10.5)
CHLORIDE SERPL-SCNC: 95 MMOL/L (ref 95–110)
CO2 SERPL-SCNC: 32 MMOL/L (ref 23–29)
CREAT SERPL-MCNC: 1.8 MG/DL (ref 0.5–1.4)
DIFFERENTIAL METHOD: ABNORMAL
EOSINOPHIL # BLD AUTO: 0.3 K/UL (ref 0–0.5)
EOSINOPHIL NFR BLD: 2.8 % (ref 0–8)
ERYTHROCYTE [DISTWIDTH] IN BLOOD BY AUTOMATED COUNT: 13.4 % (ref 11.5–14.5)
EST. GFR  (AFRICAN AMERICAN): 43 ML/MIN/1.73 M^2
EST. GFR  (NON AFRICAN AMERICAN): 37 ML/MIN/1.73 M^2
GLUCOSE SERPL-MCNC: 184 MG/DL (ref 70–110)
HCT VFR BLD AUTO: 27.6 % (ref 40–54)
HGB BLD-MCNC: 9 G/DL (ref 14–18)
IMM GRANULOCYTES # BLD AUTO: 0.04 K/UL (ref 0–0.04)
IMM GRANULOCYTES NFR BLD AUTO: 0.4 % (ref 0–0.5)
LYMPHOCYTES # BLD AUTO: 0.8 K/UL (ref 1–4.8)
LYMPHOCYTES NFR BLD: 8.5 % (ref 18–48)
MAGNESIUM SERPL-MCNC: 1.6 MG/DL (ref 1.6–2.6)
MCH RBC QN AUTO: 28.2 PG (ref 27–31)
MCHC RBC AUTO-ENTMCNC: 32.6 G/DL (ref 32–36)
MCV RBC AUTO: 87 FL (ref 82–98)
MONOCYTES # BLD AUTO: 1.1 K/UL (ref 0.3–1)
MONOCYTES NFR BLD: 11.6 % (ref 4–15)
NEUTROPHILS # BLD AUTO: 7.2 K/UL (ref 1.8–7.7)
NEUTROPHILS NFR BLD: 75.9 % (ref 38–73)
NRBC BLD-RTO: 0 /100 WBC
PLATELET # BLD AUTO: 288 K/UL (ref 150–350)
PMV BLD AUTO: 9.5 FL (ref 9.2–12.9)
POCT GLUCOSE: 234 MG/DL (ref 70–110)
POCT GLUCOSE: 242 MG/DL (ref 70–110)
POCT GLUCOSE: 244 MG/DL (ref 70–110)
POCT GLUCOSE: 265 MG/DL (ref 70–110)
POCT GLUCOSE: 310 MG/DL (ref 70–110)
POTASSIUM SERPL-SCNC: 3.2 MMOL/L (ref 3.5–5.1)
RBC # BLD AUTO: 3.19 M/UL (ref 4.6–6.2)
SODIUM SERPL-SCNC: 139 MMOL/L (ref 136–145)
WBC # BLD AUTO: 9.45 K/UL (ref 3.9–12.7)

## 2020-12-06 PROCEDURE — 83735 ASSAY OF MAGNESIUM: CPT

## 2020-12-06 PROCEDURE — 25000003 PHARM REV CODE 250: Performed by: INTERNAL MEDICINE

## 2020-12-06 PROCEDURE — 94640 AIRWAY INHALATION TREATMENT: CPT

## 2020-12-06 PROCEDURE — 94761 N-INVAS EAR/PLS OXIMETRY MLT: CPT

## 2020-12-06 PROCEDURE — 25000003 PHARM REV CODE 250: Performed by: STUDENT IN AN ORGANIZED HEALTH CARE EDUCATION/TRAINING PROGRAM

## 2020-12-06 PROCEDURE — 27100171 HC OXYGEN HIGH FLOW UP TO 24 HOURS

## 2020-12-06 PROCEDURE — 63600175 PHARM REV CODE 636 W HCPCS: Performed by: STUDENT IN AN ORGANIZED HEALTH CARE EDUCATION/TRAINING PROGRAM

## 2020-12-06 PROCEDURE — 85025 COMPLETE CBC W/AUTO DIFF WBC: CPT

## 2020-12-06 PROCEDURE — 36415 COLL VENOUS BLD VENIPUNCTURE: CPT

## 2020-12-06 PROCEDURE — 27000221 HC OXYGEN, UP TO 24 HOURS

## 2020-12-06 PROCEDURE — 25000242 PHARM REV CODE 250 ALT 637 W/ HCPCS: Performed by: STUDENT IN AN ORGANIZED HEALTH CARE EDUCATION/TRAINING PROGRAM

## 2020-12-06 PROCEDURE — 99232 PR SUBSEQUENT HOSPITAL CARE,LEVL II: ICD-10-PCS | Mod: ,,, | Performed by: INTERNAL MEDICINE

## 2020-12-06 PROCEDURE — 21400001 HC TELEMETRY ROOM

## 2020-12-06 PROCEDURE — C9399 UNCLASSIFIED DRUGS OR BIOLOG: HCPCS | Performed by: STUDENT IN AN ORGANIZED HEALTH CARE EDUCATION/TRAINING PROGRAM

## 2020-12-06 PROCEDURE — 99232 SBSQ HOSP IP/OBS MODERATE 35: CPT | Mod: ,,, | Performed by: INTERNAL MEDICINE

## 2020-12-06 PROCEDURE — 80048 BASIC METABOLIC PNL TOTAL CA: CPT

## 2020-12-06 RX ORDER — FLUTICASONE FUROATE AND VILANTEROL 200; 25 UG/1; UG/1
1 POWDER RESPIRATORY (INHALATION) DAILY
Status: DISCONTINUED | OUTPATIENT
Start: 2020-12-06 | End: 2020-12-10

## 2020-12-06 RX ORDER — METOPROLOL TARTRATE 1 MG/ML
5 INJECTION, SOLUTION INTRAVENOUS ONCE
Status: COMPLETED | OUTPATIENT
Start: 2020-12-06 | End: 2020-12-06

## 2020-12-06 RX ORDER — METOPROLOL TARTRATE 25 MG/1
25 TABLET, FILM COATED ORAL 2 TIMES DAILY
Status: DISCONTINUED | OUTPATIENT
Start: 2020-12-06 | End: 2020-12-08

## 2020-12-06 RX ORDER — PREDNISONE 20 MG/1
20 TABLET ORAL DAILY
Status: DISCONTINUED | OUTPATIENT
Start: 2020-12-06 | End: 2020-12-09

## 2020-12-06 RX ADMIN — LEVALBUTEROL HYDROCHLORIDE 0.63 MG: 0.63 SOLUTION RESPIRATORY (INHALATION) at 04:12

## 2020-12-06 RX ADMIN — FLUTICASONE FUROATE AND VILANTEROL TRIFENATATE 1 PUFF: 200; 25 POWDER RESPIRATORY (INHALATION) at 04:12

## 2020-12-06 RX ADMIN — METOPROLOL TARTRATE 5 MG: 5 INJECTION, SOLUTION INTRAVENOUS at 04:12

## 2020-12-06 RX ADMIN — Medication 100 MG: at 09:12

## 2020-12-06 RX ADMIN — PREDNISONE 20 MG: 20 TABLET ORAL at 11:12

## 2020-12-06 RX ADMIN — APIXABAN 5 MG: 5 TABLET, FILM COATED ORAL at 09:12

## 2020-12-06 RX ADMIN — DOXYCYCLINE 100 MG: 100 INJECTION, POWDER, LYOPHILIZED, FOR SOLUTION INTRAVENOUS at 09:12

## 2020-12-06 RX ADMIN — THERA TABS 1 TABLET: TAB at 09:12

## 2020-12-06 RX ADMIN — INSULIN ASPART 2 UNITS: 100 INJECTION, SOLUTION INTRAVENOUS; SUBCUTANEOUS at 05:12

## 2020-12-06 RX ADMIN — LEVALBUTEROL HYDROCHLORIDE 0.63 MG: 0.63 SOLUTION RESPIRATORY (INHALATION) at 08:12

## 2020-12-06 RX ADMIN — INSULIN DETEMIR 5 UNITS: 100 INJECTION, SOLUTION SUBCUTANEOUS at 09:12

## 2020-12-06 RX ADMIN — FENOFIBRATE 160 MG: 160 TABLET ORAL at 09:12

## 2020-12-06 RX ADMIN — LEVALBUTEROL HYDROCHLORIDE 0.63 MG: 0.63 SOLUTION RESPIRATORY (INHALATION) at 11:12

## 2020-12-06 RX ADMIN — APIXABAN 5 MG: 5 TABLET, FILM COATED ORAL at 08:12

## 2020-12-06 RX ADMIN — FUROSEMIDE 40 MG: 10 INJECTION, SOLUTION INTRAVENOUS at 09:12

## 2020-12-06 RX ADMIN — FUROSEMIDE 40 MG: 10 INJECTION, SOLUTION INTRAVENOUS at 08:12

## 2020-12-06 RX ADMIN — POTASSIUM PHOSPHATE, MONOBASIC 500 MG: 500 TABLET, SOLUBLE ORAL at 03:12

## 2020-12-06 RX ADMIN — INSULIN ASPART 2 UNITS: 100 INJECTION, SOLUTION INTRAVENOUS; SUBCUTANEOUS at 10:12

## 2020-12-06 RX ADMIN — Medication 400 MG: at 09:12

## 2020-12-06 RX ADMIN — INSULIN ASPART 2 UNITS: 100 INJECTION, SOLUTION INTRAVENOUS; SUBCUTANEOUS at 09:12

## 2020-12-06 RX ADMIN — METOPROLOL TARTRATE 25 MG: 25 TABLET, FILM COATED ORAL at 04:12

## 2020-12-06 RX ADMIN — METOPROLOL TARTRATE 25 MG: 25 TABLET, FILM COATED ORAL at 08:12

## 2020-12-06 RX ADMIN — ATORVASTATIN CALCIUM 80 MG: 40 TABLET, FILM COATED ORAL at 08:12

## 2020-12-06 RX ADMIN — INSULIN ASPART 3 UNITS: 100 INJECTION, SOLUTION INTRAVENOUS; SUBCUTANEOUS at 11:12

## 2020-12-06 RX ADMIN — LEVALBUTEROL HYDROCHLORIDE 0.63 MG: 0.63 SOLUTION RESPIRATORY (INHALATION) at 12:12

## 2020-12-06 NOTE — ASSESSMENT & PLAN NOTE
Newly noted this admission  HR controlled  CHADSVASC 3  Cont eliquis 5mg bid  Plan MARIA/DCCV on Monday 12/7/20

## 2020-12-06 NOTE — PROGRESS NOTES
[facility]  Cardiology  Progress Note    Patient Name: Chato Bowie  MRN: [id number]  Admission Date: 12/4/2020  Hospital Length of Stay: 1 days  Code Status: Full Code   Attending Physician: Sea Phelan MD   Primary Care Physician: Irlanda Valenzuela  Expected Discharge Date:   Principal Problem:Acute hypoxemic respiratory failure    Subjective:     Hospital Course:   12/4 adm with resp failure and AFL    Interval Hx: SOB improving, no cp/palps/LH.    Tele: -120 (personally reviewed and interpreted)          Past Medical History:   Diagnosis Date    Acute congestive heart failure 3/20/2020    Alcohol abuse     Arthritis     Coronary artery disease     Diabetes mellitus     Hyperlipemia     Hypertension     Rhabdomyolysis        Past Surgical History:   Procedure Laterality Date    EYE SURGERY      VASCULAR SURGERY         Review of patient's allergies indicates:  No Known Allergies    No current facility-administered medications on file prior to encounter.      Current Outpatient Medications on File Prior to Encounter   Medication Sig    amlodipine (NORVASC) 10 MG tablet Take 10 mg by mouth once daily.    aspirin (ECOTRIN) 81 MG EC tablet Take 1 tablet (81 mg total) by mouth once daily.    aspirin-calcium carbonate 81 mg-300 mg calcium(777 mg) Tab Take 81 mg by mouth.    atorvastatin (LIPITOR) 80 MG tablet Take 80 mg by mouth every evening.    atropine 1% (ISOPTO ATROPINE) 1 % Drop INT 1 GTT INTO OD QD FOR 1 WK    carvedilol (COREG) 12.5 MG tablet Take 1 tablet (12.5 mg total) by mouth 2 (two) times daily. (Patient taking differently: Take 25 mg by mouth 2 (two) times daily. )    colchicine (COLCRYS) 0.6 mg tablet Take 1 tablet (0.6 mg total) by mouth once daily. for 14 days    DUREZOL 0.05 % Drop ophthalmic solution INT 1 GTT INTO OD FID FOR 3 WEEKS    fenofibrate 160 MG Tab Take 160 mg by mouth.    folic acid (FOLVITE) 1 MG tablet Take 1 mg by mouth.    furosemide  (LASIX) 40 MG tablet Take 1 tablet (40 mg total) by mouth once daily.    hydrALAZINE (APRESOLINE) 50 MG tablet Take 1 tablet (50 mg total) by mouth 3 (three) times daily.    insulin syringe-needle U-100 1 mL 31 gauge x 5/16 Syrg USE TO INJECT INSULIN TWICE A DAY    lansoprazole (PREVACID) 30 MG capsule Take 30 mg by mouth once daily.    magnesium oxide (MAG-OX) 400 mg (241.3 mg magnesium) tablet Take 800 mg by mouth.    multivitamin Tab Take 1 tablet by mouth once daily.    NOVOLOG MIX 70-30 U-100 INSULN 100 unit/mL (70-30) Soln Inject 10 Units into the skin before meals as needed.    pantoprazole (PROTONIX) 40 MG tablet Take 1 tablet (40 mg total) by mouth 2 (two) times daily.    rosuvastatin (CRESTOR) 40 MG Tab Take 10 mg by mouth once daily.    sildenafil (VIAGRA) 100 MG tablet Take 1 tablet (100 mg total) by mouth daily as needed for Erectile Dysfunction. *generic tabs*    tamsulosin (FLOMAX) 0.4 mg Cap Take 1 capsule (0.4 mg total) by mouth once daily.    terbinafine HCl (LAMISIL) 250 mg tablet     thiamine 100 MG tablet Take 1 tablet (100 mg total) by mouth once daily.     Family History     Problem Relation (Age of Onset)    Diabetes Mother, Father, Sister    Hypertension Mother, Father, Sister        Tobacco Use    Smoking status: Never Smoker    Smokeless tobacco: Never Used   Substance and Sexual Activity    Alcohol use: Yes     Alcohol/week: 4.0 standard drinks     Types: 4 Cans of beer per week    Drug use: No    Sexual activity: Not on file     Review of Systems   Gastrointestinal: Negative for melena.   Genitourinary: Negative for hematuria.     Objective:     Vital Signs (Most Recent):  Temp: 98.2 °F (36.8 °C) (12/06/20 1130)  Pulse: (!) 119 (12/06/20 1130)  Resp: 20 (12/06/20 1130)  BP: 131/78 (12/06/20 1130)  SpO2: 97 % (12/06/20 1130) Vital Signs (24h Range):  Temp:  [97.6 °F (36.4 °C)-99.3 °F (37.4 °C)] 98.2 °F (36.8 °C)  Pulse:  [106-133] 119  Resp:  [16-20] 20  SpO2:  [95 %-100  %] 97 %  BP: (115-131)/(76-90) 131/78     Weight: 103.9 kg (229 lb 0.9 oz)  Body mass index is 31.95 kg/m².    SpO2: 97 %  O2 Device (Oxygen Therapy): High Flow nasal Cannula      Intake/Output Summary (Last 24 hours) at 12/6/2020 1300  Last data filed at 12/6/2020 1100  Gross per 24 hour   Intake 840 ml   Output 3101 ml   Net -2261 ml       Lines/Drains/Airways     Peripheral Intravenous Line                 Peripheral IV - Single Lumen 12/04/20 2246 18 G Left Antecubital 1 day              Exam unchanged vs 12/5/20  Physical Exam   Constitutional: He is oriented to person, place, and time. He appears well-developed and well-nourished. No distress.   HENT:   Head: Normocephalic and atraumatic.   Eyes: Pupils are equal, round, and reactive to light. Conjunctivae and EOM are normal. No scleral icterus.   Neck: Normal range of motion. Neck supple. No JVD present. No tracheal deviation present. No thyromegaly present.   Cardiovascular: Normal rate, S1 normal, S2 normal and intact distal pulses. An irregularly irregular rhythm present. Exam reveals no gallop and no friction rub.   Murmur heard.   Systolic murmur is present with a grade of 1/6.  Pulmonary/Chest: Effort normal and breath sounds normal. No respiratory distress. He has no wheezes. He has no rales. He exhibits no tenderness.   Abdominal: Soft. He exhibits no distension.   Musculoskeletal:         General: No edema.   Neurological: He is alert and oriented to person, place, and time. He has normal strength. No cranial nerve deficit.   Skin: Skin is warm and dry. No rash noted. He is not diaphoretic.   Psychiatric: He has a normal mood and affect. His behavior is normal.       Current Medications:   apixaban  5 mg Oral BID    atorvastatin  80 mg Oral QHS    doxycycline (VIBRAMYCIN) IVPB  100 mg Intravenous Q12H    fenofibrate  160 mg Oral Daily    furosemide (LASIX) IV  40 mg Intravenous BID    levalbuterol  0.63 mg Nebulization Q8H    magnesium oxide   400 mg Oral Daily    metoprolol tartrate  25 mg Oral BID    multivitamin  1 tablet Oral Daily    predniSONE  20 mg Oral Daily    thiamine  100 mg Oral Daily       benzonatate, dextrose 50%, dextrose 50%, glucagon (human recombinant), glucose, glucose, guaifenesin 100 mg/5 ml, insulin aspart U-100, sodium chloride 0.9%    Laboratory (all labs reviewed):  CBC:  Recent Labs   Lab 11/26/20 0217 11/27/20  0540 12/04/20 2323 12/05/20 0645 12/06/20  0505   WBC 6.48 7.48 7.58 7.21 9.45   Hemoglobin 10.4 L 11.0 L 9.2 L 9.9 L 9.0 L   Hematocrit 28.8 L 30.9 L 27.2 L 29.6 L 27.6 L   Platelets 177 196 243 278 288       CHEMISTRIES:  Recent Labs   Lab 11/24/20 2157 11/26/20 0217 11/26/20  1440 11/27/20 0540 12/04/20 2323 12/05/20 0645 12/06/20  0505   Glucose  --    < > 295 H   < > 318 H 241 H 224 H 195 H 184 H   Sodium  --    < > 135 L   < > 134 L 138 139 138 139   Potassium 2.3 LL   < > 2.6 LL   < > 3.6 3.1 L 2.6 LL 3.4 L 3.2 L   BUN  --    < > 21   < > 18 16 19 19 20   Creatinine  --    < > 2.0 H   < > 1.7 H 1.6 H 1.7 H 1.7 H 1.8 H   eGFR if   --    < > 38 A   < > 46 A 50 A 46 A 46 A 43 A   eGFR if non   --    < > 33 A   < > 40 A 43 A 40 A 40 A 37 A   Calcium  --    < > 7.8 L   < > 8.0 L 8.1 L 8.1 L 8.4 L 8.5 L   Magnesium 1.8  --  1.5 L  --   --  1.8 1.4 L  --  1.6    < > = values in this interval not displayed.       CARDIAC BIOMARKERS:  Recent Labs   Lab 11/24/20 2001 11/24/20 2157 11/25/20  0655 12/04/20 2323 12/05/20 0645 12/05/20  1545    H  --   --   --   --   --    Troponin I 0.060 H 0.067 H 0.070 H 0.022 0.013 0.023       COAGS:  Recent Labs   Lab 03/20/20  0014 11/24/20  1519   INR 1.0 1.0       LIPIDS/LFTS:  Recent Labs   Lab 03/20/20  0014 11/24/20  1519 11/25/20  0433 11/26/20  0638 12/04/20  2323   Cholesterol  --  98 L  --   --   --    Triglycerides  --  234 H  --   --   --    HDL  --  36 L  --   --   --    LDL Cholesterol  --  15.2 L  --   --   --     Non-HDL Cholesterol  --  62  --   --   --    AST 26 47 H 47 H 43 H 29   ALT 24 28 27 23 26       BNP:  Recent Labs   Lab 03/20/20  0014 11/24/20  1519 12/04/20  2323    H 97 868 H       TSH:  Recent Labs   Lab 11/24/20  1519   TSH 1.539       Free T4:        Diagnostic Results:  ECG (personally reviewed and interpreted tracing(s)):  11/24/20 1516 SR 82, 1st deg AVB, IVCD  12/5/20 0030 SR vs ?2:1 , IVCD  12/5/20 1113 tele AF 90s    Chest X-Ray (personally reviewed and interpreted image(s)): 12/5/20 ?CHF vs PNA    Echo: 11/25/20  · The left ventricle is normal in size with normal systolic function. The estimated ejection fraction is 55%.  · There is left ventricular concentric hypertrophy.  · Moderate left atrial enlargement.  · Normal left ventricular diastolic function.  · Normal right ventricular size with normal right ventricular systolic function.  · Mild-to-moderate aortic valve stenosis.  · Aortic valve area is 1.61 cm2; peak velocity is 3.23 m/s; mean gradient is 25 mmHg.  · Moderate aortic regurgitation.  · Mild mitral regurgitation.  · No pulmonary hypertension    Stress Test: L MPi 11/25/20    Normal myocardial perfusion scan. There is no evidence of myocardial ischemia or infarction.    There is a  moderate intensity fixed defect in the inferior wall of the left ventricle secondary to diaphragm attenuation.    Gated perfusion images showed an ejection fraction of 48% post stress.    There is normal wall motion post stress.    The EKG portion of this study is abnormal but not diagnostic.    There were no arrhythmias during stress.          Assessment and Plan:     * Acute hypoxemic respiratory failure  Mgmt per IM  Agree with lasix for vol overload  Atrial arrhythmia not helping  Cont efforts at rate control    Paroxysmal atrial fibrillation  Newly noted this admission  HR controlled  CHADSVASC 3  Cont eliquis 5mg bid  Plan MARIA/DCCV on Monday 12/7/20      Essential hypertension  Cont  med rx    Hyperlipidemia  Cont statin    Type 2 diabetes mellitus, uncontrolled  Per IM    Acute on chronic diastolic congestive heart failure  Recent cardiac workup (echo/MPI 11/2020) normal.  Mild to mod AS noted, EF normal.  No need for repeat isch eval    CKD (chronic kidney disease), stage III  Creat stable        VTE Risk Mitigation (From admission, onward)         Ordered     apixaban tablet 5 mg  2 times daily      12/05/20 1138     IP VTE HIGH RISK PATIENT  Once      12/05/20 0556     Place sequential compression device  Until discontinued      12/05/20 0556                Indra Foote MD  Cardiology  [facility]

## 2020-12-06 NOTE — ASSESSMENT & PLAN NOTE
Patient presented with dry cough and persistent shortness of breath.  Became hypoxic and went for CTA which showed no evidence of a pulmonary embolism but noted patchy multifocal opacities within the right lower lobe as well as small bilateral pleural effusions with mild dependent consolidations loss compressive atelectasis.  Patient does not appear in distress but was a non-rebreather mask.  Lung exam were consistent with volume overload and some expiratory wheezing  There is concern for some sort of infectious process.  Start patient on doxycycline and check procalcitonin.  Procalcitonin only 0.11.  Will continue with IV diuresis and supplemental oxygen to wean as tolerated.  COVID negative.  Also with peribronchial thickening which is nonspecific.  Will give nebulizers for shortness of breath and wheezing.  Having more wheezing today, started on prednisone and inhalers.  Oxygen requirements decreased

## 2020-12-06 NOTE — SUBJECTIVE & OBJECTIVE
Past Medical History:   Diagnosis Date    Acute congestive heart failure 3/20/2020    Alcohol abuse     Arthritis     Coronary artery disease     Diabetes mellitus     Hyperlipemia     Hypertension     Rhabdomyolysis        Past Surgical History:   Procedure Laterality Date    EYE SURGERY      VASCULAR SURGERY         Review of patient's allergies indicates:  No Known Allergies    No current facility-administered medications on file prior to encounter.      Current Outpatient Medications on File Prior to Encounter   Medication Sig    amlodipine (NORVASC) 10 MG tablet Take 10 mg by mouth once daily.    aspirin (ECOTRIN) 81 MG EC tablet Take 1 tablet (81 mg total) by mouth once daily.    aspirin-calcium carbonate 81 mg-300 mg calcium(777 mg) Tab Take 81 mg by mouth.    atorvastatin (LIPITOR) 80 MG tablet Take 80 mg by mouth every evening.    atropine 1% (ISOPTO ATROPINE) 1 % Drop INT 1 GTT INTO OD QD FOR 1 WK    carvedilol (COREG) 12.5 MG tablet Take 1 tablet (12.5 mg total) by mouth 2 (two) times daily. (Patient taking differently: Take 25 mg by mouth 2 (two) times daily. )    colchicine (COLCRYS) 0.6 mg tablet Take 1 tablet (0.6 mg total) by mouth once daily. for 14 days    DUREZOL 0.05 % Drop ophthalmic solution INT 1 GTT INTO OD FID FOR 3 WEEKS    fenofibrate 160 MG Tab Take 160 mg by mouth.    folic acid (FOLVITE) 1 MG tablet Take 1 mg by mouth.    furosemide (LASIX) 40 MG tablet Take 1 tablet (40 mg total) by mouth once daily.    hydrALAZINE (APRESOLINE) 50 MG tablet Take 1 tablet (50 mg total) by mouth 3 (three) times daily.    insulin syringe-needle U-100 1 mL 31 gauge x 5/16 Syrg USE TO INJECT INSULIN TWICE A DAY    lansoprazole (PREVACID) 30 MG capsule Take 30 mg by mouth once daily.    magnesium oxide (MAG-OX) 400 mg (241.3 mg magnesium) tablet Take 800 mg by mouth.    multivitamin Tab Take 1 tablet by mouth once daily.    NOVOLOG MIX 70-30 U-100 INSULN 100 unit/mL (70-30) Soln  Inject 10 Units into the skin before meals as needed.    pantoprazole (PROTONIX) 40 MG tablet Take 1 tablet (40 mg total) by mouth 2 (two) times daily.    rosuvastatin (CRESTOR) 40 MG Tab Take 10 mg by mouth once daily.    sildenafil (VIAGRA) 100 MG tablet Take 1 tablet (100 mg total) by mouth daily as needed for Erectile Dysfunction. *generic tabs*    tamsulosin (FLOMAX) 0.4 mg Cap Take 1 capsule (0.4 mg total) by mouth once daily.    terbinafine HCl (LAMISIL) 250 mg tablet     thiamine 100 MG tablet Take 1 tablet (100 mg total) by mouth once daily.     Family History     Problem Relation (Age of Onset)    Diabetes Mother, Father, Sister    Hypertension Mother, Father, Sister        Tobacco Use    Smoking status: Never Smoker    Smokeless tobacco: Never Used   Substance and Sexual Activity    Alcohol use: Yes     Alcohol/week: 4.0 standard drinks     Types: 4 Cans of beer per week    Drug use: No    Sexual activity: Not on file     Review of Systems   Gastrointestinal: Negative for melena.   Genitourinary: Negative for hematuria.     Objective:     Vital Signs (Most Recent):  Temp: 98.2 °F (36.8 °C) (12/06/20 1130)  Pulse: (!) 119 (12/06/20 1130)  Resp: 20 (12/06/20 1130)  BP: 131/78 (12/06/20 1130)  SpO2: 97 % (12/06/20 1130) Vital Signs (24h Range):  Temp:  [97.6 °F (36.4 °C)-99.3 °F (37.4 °C)] 98.2 °F (36.8 °C)  Pulse:  [106-133] 119  Resp:  [16-20] 20  SpO2:  [95 %-100 %] 97 %  BP: (115-131)/(76-90) 131/78     Weight: 103.9 kg (229 lb 0.9 oz)  Body mass index is 31.95 kg/m².    SpO2: 97 %  O2 Device (Oxygen Therapy): High Flow nasal Cannula      Intake/Output Summary (Last 24 hours) at 12/6/2020 1300  Last data filed at 12/6/2020 1100  Gross per 24 hour   Intake 840 ml   Output 3101 ml   Net -2261 ml       Lines/Drains/Airways     Peripheral Intravenous Line                 Peripheral IV - Single Lumen 12/04/20 2246 18 G Left Antecubital 1 day              Exam unchanged vs 12/5/20  Physical Exam    Constitutional: He is oriented to person, place, and time. He appears well-developed and well-nourished. No distress.   HENT:   Head: Normocephalic and atraumatic.   Eyes: Pupils are equal, round, and reactive to light. Conjunctivae and EOM are normal. No scleral icterus.   Neck: Normal range of motion. Neck supple. No JVD present. No tracheal deviation present. No thyromegaly present.   Cardiovascular: Normal rate, S1 normal, S2 normal and intact distal pulses. An irregularly irregular rhythm present. Exam reveals no gallop and no friction rub.   Murmur heard.   Systolic murmur is present with a grade of 1/6.  Pulmonary/Chest: Effort normal and breath sounds normal. No respiratory distress. He has no wheezes. He has no rales. He exhibits no tenderness.   Abdominal: Soft. He exhibits no distension.   Musculoskeletal:         General: No edema.   Neurological: He is alert and oriented to person, place, and time. He has normal strength. No cranial nerve deficit.   Skin: Skin is warm and dry. No rash noted. He is not diaphoretic.   Psychiatric: He has a normal mood and affect. His behavior is normal.       Current Medications:   apixaban  5 mg Oral BID    atorvastatin  80 mg Oral QHS    doxycycline (VIBRAMYCIN) IVPB  100 mg Intravenous Q12H    fenofibrate  160 mg Oral Daily    furosemide (LASIX) IV  40 mg Intravenous BID    levalbuterol  0.63 mg Nebulization Q8H    magnesium oxide  400 mg Oral Daily    metoprolol tartrate  25 mg Oral BID    multivitamin  1 tablet Oral Daily    predniSONE  20 mg Oral Daily    thiamine  100 mg Oral Daily       benzonatate, dextrose 50%, dextrose 50%, glucagon (human recombinant), glucose, glucose, guaifenesin 100 mg/5 ml, insulin aspart U-100, sodium chloride 0.9%    Laboratory (all labs reviewed):  CBC:  Recent Labs   Lab 11/26/20  0217 11/27/20  0540 12/04/20  2323 12/05/20  0645 12/06/20  0505   WBC 6.48 7.48 7.58 7.21 9.45   Hemoglobin 10.4 L 11.0 L 9.2 L 9.9 L 9.0 L    Hematocrit 28.8 L 30.9 L 27.2 L 29.6 L 27.6 L   Platelets 177 196 243 278 288       CHEMISTRIES:  Recent Labs   Lab 11/24/20 2157 11/26/20 0217  11/26/20  1440 11/27/20  0540 12/04/20  2323 12/05/20  0645 12/06/20  0505   Glucose  --    < > 295 H   < > 318 H 241 H 224 H 195 H 184 H   Sodium  --    < > 135 L   < > 134 L 138 139 138 139   Potassium 2.3 LL   < > 2.6 LL   < > 3.6 3.1 L 2.6 LL 3.4 L 3.2 L   BUN  --    < > 21   < > 18 16 19 19 20   Creatinine  --    < > 2.0 H   < > 1.7 H 1.6 H 1.7 H 1.7 H 1.8 H   eGFR if   --    < > 38 A   < > 46 A 50 A 46 A 46 A 43 A   eGFR if non   --    < > 33 A   < > 40 A 43 A 40 A 40 A 37 A   Calcium  --    < > 7.8 L   < > 8.0 L 8.1 L 8.1 L 8.4 L 8.5 L   Magnesium 1.8  --  1.5 L  --   --  1.8 1.4 L  --  1.6    < > = values in this interval not displayed.       CARDIAC BIOMARKERS:  Recent Labs   Lab 11/24/20 2001 11/24/20 2157 11/25/20  0655 12/04/20  2323 12/05/20  0645 12/05/20  1545    H  --   --   --   --   --    Troponin I 0.060 H 0.067 H 0.070 H 0.022 0.013 0.023       COAGS:  Recent Labs   Lab 03/20/20 0014 11/24/20  1519   INR 1.0 1.0       LIPIDS/LFTS:  Recent Labs   Lab 03/20/20 0014 11/24/20  1519 11/25/20  0433 11/26/20  0638 12/04/20  2323   Cholesterol  --  98 L  --   --   --    Triglycerides  --  234 H  --   --   --    HDL  --  36 L  --   --   --    LDL Cholesterol  --  15.2 L  --   --   --    Non-HDL Cholesterol  --  62  --   --   --    AST 26 47 H 47 H 43 H 29   ALT 24 28 27 23 26       BNP:  Recent Labs   Lab 03/20/20  0014 11/24/20  1519 12/04/20  2323    H 97 868 H       TSH:  Recent Labs   Lab 11/24/20  1519   TSH 1.539       Free T4:        Diagnostic Results:  ECG (personally reviewed and interpreted tracing(s)):  11/24/20 1516 SR 82, 1st deg AVB, IVCD  12/5/20 0030 SR vs ?2:1 , IVCD  12/5/20 1113 tele AF 90s    Chest X-Ray (personally reviewed and interpreted image(s)): 12/5/20 ?CHF vs PNA    Echo:  11/25/20  · The left ventricle is normal in size with normal systolic function. The estimated ejection fraction is 55%.  · There is left ventricular concentric hypertrophy.  · Moderate left atrial enlargement.  · Normal left ventricular diastolic function.  · Normal right ventricular size with normal right ventricular systolic function.  · Mild-to-moderate aortic valve stenosis.  · Aortic valve area is 1.61 cm2; peak velocity is 3.23 m/s; mean gradient is 25 mmHg.  · Moderate aortic regurgitation.  · Mild mitral regurgitation.  · No pulmonary hypertension    Stress Test: L MPi 11/25/20    Normal myocardial perfusion scan. There is no evidence of myocardial ischemia or infarction.    There is a  moderate intensity fixed defect in the inferior wall of the left ventricle secondary to diaphragm attenuation.    Gated perfusion images showed an ejection fraction of 48% post stress.    There is normal wall motion post stress.    The EKG portion of this study is abnormal but not diagnostic.    There were no arrhythmias during stress.

## 2020-12-06 NOTE — PLAN OF CARE
"I received a notification from the nurse, "Mr Bowie, Rm 322, admitted with chf, heart rate has been hanging around 125 for few hours. Been in afib/flutter since admission 12/04. Hasn't had any chest pain. Having MARIA/DCCV Monday."    Temp:  [97.6 °F (36.4 °C)-99.3 °F (37.4 °C)] 99.3 °F (37.4 °C)  Pulse:  [] 110  Resp:  [16-22] 19  SpO2:  [71 %-100 %] 97 %  BP: (116-131)/(71-90) 131/81     Will discontinue Coreg  Start Lopressor 25mg po BID, first now  Lopressor 5mg iv x 1  "

## 2020-12-06 NOTE — PLAN OF CARE
Problem: Fall Injury Risk  Goal: Absence of Fall and Fall-Related Injury  Outcome: Ongoing, Progressing  Intervention: Identify and Manage Contributors to Fall Injury Risk  Flowsheets (Taken 12/6/2020 1746)  Self-Care Promotion: independence encouraged  Medication Review/Management: medications reviewed  Intervention: Promote Injury-Free Environment  Flowsheets (Taken 12/6/2020 1746)  Safety Promotion/Fall Prevention: assistive device/personal item within reach     Problem: Adult Inpatient Plan of Care  Goal: Plan of Care Review  Outcome: Ongoing, Progressing  Goal: Patient-Specific Goal (Individualization)  Outcome: Ongoing, Progressing  Goal: Absence of Hospital-Acquired Illness or Injury  Outcome: Ongoing, Progressing  Intervention: Identify and Manage Fall Risk  Flowsheets (Taken 12/6/2020 1746)  Safety Promotion/Fall Prevention: assistive device/personal item within reach  Goal: Optimal Comfort and Wellbeing  Outcome: Ongoing, Progressing  Goal: Readiness for Transition of Care  Outcome: Ongoing, Progressing  Goal: Rounds/Family Conference  Outcome: Ongoing, Progressing     Problem: Diabetes Comorbidity  Goal: Blood Glucose Level Within Desired Range  Outcome: Ongoing, Progressing  Intervention: Maintain Glycemic Control  Flowsheets (Taken 12/6/2020 1746)  Glycemic Management: blood glucose monitoring     Problem: Adjustment to Illness (Sepsis/Septic Shock)  Goal: Optimal Coping  Outcome: Ongoing, Progressing     Problem: Bleeding (Sepsis/Septic Shock)  Goal: Absence of Bleeding  Outcome: Ongoing, Progressing     Problem: Glycemic Control Impaired (Sepsis/Septic Shock)  Goal: Blood Glucose Level Within Desired Range  Outcome: Ongoing, Progressing     Problem: Hemodynamic Instability (Sepsis/Septic Shock)  Goal: Effective Tissue Perfusion  Outcome: Ongoing, Progressing     Problem: Infection (Sepsis/Septic Shock)  Goal: Absence of Infection Signs/Symptoms  Outcome: Ongoing, Progressing     Problem: Nutrition  Impaired (Sepsis/Septic Shock)  Goal: Optimal Nutrition Intake  Outcome: Ongoing, Progressing     Problem: Respiratory Compromise (Sepsis/Septic Shock)  Goal: Effective Oxygenation and Ventilation  Outcome: Ongoing, Progressing     Problem: Electrolyte Imbalance (Acute Kidney Injury/Impairment)  Goal: Serum Electrolyte Balance  Outcome: Ongoing, Progressing     Problem: Fluid Imbalance (Acute Kidney Injury/Impairment)  Goal: Optimal Fluid Balance  Outcome: Ongoing, Progressing     Problem: Hematologic Alteration (Acute Kidney Injury/Impairment)  Goal: Hemoglobin, Hematocrit and Platelets Within Normal Range  Outcome: Ongoing, Progressing     Problem: Oral Intake Inadequate (Acute Kidney Injury/Impairment)  Goal: Optimal Nutrition Intake  Outcome: Ongoing, Progressing     Problem: Renal Function Impairment (Acute Kidney Injury/Impairment)  Goal: Effective Renal Function  Outcome: Ongoing, Progressing     Problem: Fluid Imbalance (Pneumonia)  Goal: Fluid Balance  Outcome: Ongoing, Progressing     Problem: Infection (Pneumonia)  Goal: Resolution of Infection Signs/Symptoms  Outcome: Ongoing, Progressing     Problem: Respiratory Compromise (Pneumonia)  Goal: Effective Oxygenation and Ventilation  Outcome: Ongoing, Progressing

## 2020-12-06 NOTE — ASSESSMENT & PLAN NOTE
Relatively normotensive at this time.  Madeleine loss.  Overnight and started on metoprolol 25 mg p.o. b.i.d..  Will continue to monitor.

## 2020-12-06 NOTE — PROGRESS NOTES
MEWS Monitoring: Chart check completed, abnormal VS noted, bedside RN, Tia notified. Bedside rounds completed. Pt calm, resting, no signs of distress. no concerns verbalized at this time, instructed to call [phone] for further concerns or assistance.

## 2020-12-06 NOTE — PROGRESS NOTES
[facility] Hospital Medicine  Progress Note    Patient Name: Chato Bowie  MRN: [id number]  Patient Class: IP- Inpatient   Admission Date: 12/4/2020  Length of Stay: 1 days  Attending Physician: Sea Phelan MD  Primary Care Provider: [facility]        Subjective:     Principal Problem:Acute hypoxemic respiratory failure        HPI:  Mr. Marie is a 70-year-old male with a past medical history of coronary artery disease, hypertension, diabetes, BPH, alcohol use who presents after he has been having a cough for 1 week.  The patient was recently admitted for chest pain and underwent nuclear stress test.  She had a fixed defect the patient was discharged with follow-up.  The patient states that ever since he was discharged, he has been having a dry cough and it has gotten so bad that it makes his chest hurt.  He is not producing any sputum.  Otherwise, he does not have chest pain on exertion.  Only when he coughs.  He denies fevers or chills but endorses shortness of breath with exertion in anything that he does.  He is not able to lie flat.  His lower extremities do have some swelling he states that this is his baseline and not worse than usual.  He denies smoking and denies any history of lung disease.  However he does believe that he may have some sort of asthma. The patient states he has not had any alcohol since he was discharged last week.    Overview/Hospital Course:  No notes on file    Interval History:  Is patient states he is feeling somewhat better today.  Breathing improved but still with dry cough and pain coughing.    Review of Systems   Constitutional: Negative for chills and fever.   HENT: Negative for congestion and sore throat.    Respiratory: Positive for cough and wheezing. Negative for shortness of breath.    Cardiovascular: Positive for chest pain. Negative for palpitations and leg swelling.   Gastrointestinal: Negative for abdominal pain, constipation, diarrhea,  nausea and vomiting.   Genitourinary: Negative for dysuria.   Musculoskeletal: Negative for arthralgias and back pain.   Neurological: Negative for dizziness, numbness and headaches.   Psychiatric/Behavioral: Negative for agitation and confusion.     Objective:     Vital Signs (Most Recent):  Temp: 98.2 °F (36.8 °C) (12/06/20 1130)  Pulse: (!) 119 (12/06/20 1130)  Resp: 20 (12/06/20 1130)  BP: 131/78 (12/06/20 1130)  SpO2: 97 % (12/06/20 1130) Vital Signs (24h Range):  Temp:  [97.6 °F (36.4 °C)-99.3 °F (37.4 °C)] 98.2 °F (36.8 °C)  Pulse:  [106-133] 119  Resp:  [16-20] 20  SpO2:  [95 %-100 %] 97 %  BP: (115-131)/(76-90) 131/78     Weight: 103.9 kg (229 lb 0.9 oz)  Body mass index is 31.95 kg/m².    Intake/Output Summary (Last 24 hours) at 12/6/2020 1343  Last data filed at 12/6/2020 1100  Gross per 24 hour   Intake 840 ml   Output 3101 ml   Net -2261 ml      Physical Exam  Vitals signs and nursing note reviewed.   Constitutional:       General: He is not in acute distress.     Appearance: Normal appearance. He is not ill-appearing.   HENT:      Head: Normocephalic.      Nose: No congestion.      Mouth/Throat:      Pharynx: Oropharynx is clear.   Eyes:      General: No scleral icterus.     Conjunctiva/sclera: Conjunctivae normal.   Neck:      Musculoskeletal: Neck supple.   Cardiovascular:      Rate and Rhythm: Normal rate and regular rhythm.      Pulses: Normal pulses.   Pulmonary:      Effort: Pulmonary effort is normal.      Comments: Mild crackles at bases and significant expiratory wheezing  Abdominal:      General: Bowel sounds are normal. There is no distension.      Tenderness: There is no abdominal tenderness.   Musculoskeletal: Normal range of motion.      Comments: Mild nonpitting edema to b/l lower extremities   Skin:     General: Skin is warm and dry.   Neurological:      General: No focal deficit present.      Mental Status: He is alert and oriented to person, place, and time.   Psychiatric:          Mood and Affect: Mood normal.         Thought Content: Thought content normal.         Judgment: Judgment normal.         Significant Labs: All pertinent labs within the past 24 hours have been reviewed.    Significant Imaging: I have reviewed all pertinent imaging results/findings within the past 24 hours.      Assessment/Plan:      * Acute hypoxemic respiratory failure  Patient presented with dry cough and persistent shortness of breath.  Became hypoxic and went for CTA which showed no evidence of a pulmonary embolism but noted patchy multifocal opacities within the right lower lobe as well as small bilateral pleural effusions with mild dependent consolidations loss compressive atelectasis.  Patient does not appear in distress but was a non-rebreather mask.  Lung exam were consistent with volume overload and some expiratory wheezing  There is concern for some sort of infectious process.  Start patient on doxycycline and check procalcitonin.  Procalcitonin only 0.11.  Will continue with IV diuresis and supplemental oxygen to wean as tolerated.  COVID negative.  Also with peribronchial thickening which is nonspecific.  Will give nebulizers for shortness of breath and wheezing.  Having more wheezing today, started on prednisone and inhalers.  Oxygen requirements decreased      Other chest pain  Patient describes his chest pain is only when he coughs which has been going on for 1 week.  He also describes the pain in his abdomen.  Denies chest pain on exertion, EKG reviewed troponin downtrending.  Do not suspect ACS at this time.  Continue with aspirin and statin and symptom relief      Pneumonia of right lower lobe due to infectious organism  Patient gives a history a dry cough with shortness of breath and is now hypoxic.  He does have evidence of volume overload however on CT a it showed that he had multifocal patchy opacities in right lower.  More likely to be atypical in nature given his symptoms.  COVID negative.   Started doxy cycline however pro calcitonin and.  Continue for total of 5 days considering exam findings and clinical presentation.  Wean off oxygen as tolerated      Aortic stenosis, moderate  Noted on previous echo      CKD (chronic kidney disease), stage III  Appears to be at baseline.  Will continue to monitor      Acute on chronic diastolic congestive heart failure  Patient has a history of low normal EF and diastolic dysfunction.  Last echocardiogram 1 week ago showed improved EF and no diastolic heart dysfunction.  Concern for some mild volume overload at this time.  Will continue with IV diuresis to see if this will improve his symptoms.  Patient reports compliance with his home medication      Alcohol abuse  Patient has a history of alcohol abuse however denies any recent used in the past week.  Not showing any signs or symptoms of withdrawal at this time.  Continue with thiamine and multivitamin      Hypokalemia  Will replace and monitor      GERD (gastroesophageal reflux disease)  Not on any medications at home.  Will monitor for now.      Anemia of chronic disease  Hemoglobin appears slightly lower than on previous admissions.  No evidence of bleeding at this time.  Will continue diuresis and monitor.      Type 2 diabetes mellitus, uncontrolled  On NovoLog at home.  Will continue with a diabetic diet and sliding scale for now and adjust as needed.  Added  Basal insulin        Hyperlipidemia  Continue with home statin      Essential hypertension  Relatively normotensive at this time.  Madeleine loss.  Overnight and started on metoprolol 25 mg p.o. b.i.d..  Will continue to monitor.      CAD (coronary artery disease)  History of coronary artery disease.  Continue with aspirin statin      Paroxysmal atrial fibrillation  EKG reviewed and noted wide complex tachycardia, not consistent VT.  Reviewed older EKGs and there are changes.    On admission, patient appears to be in atrial flutter/fibrillation with an  irregular rhythm on telemetry but rate is 80s. Will continue to monitor, patient states he's been compliant with his home medications.   Cardiology consulted   Patient started on Eliquis 5 mg p.o. b.i.d. and plans for cardioversion tomorrow 12/07/2020  Started on metoprolol 25 b.i.d. for rate control        VTE Risk Mitigation (From admission, onward)         Ordered     apixaban tablet 5 mg  2 times daily      12/05/20 1138     IP VTE HIGH RISK PATIENT  Once      12/05/20 0556     Place sequential compression device  Until discontinued      12/05/20 0556                Discharge Planning   ELY:      Code Status: Full Code   Is the patient medically ready for discharge?:     Reason for patient still in hospital (select all that apply): Treatment  Discharge Plan A: Home                  Sea Phelan MD  Department of Hospital Medicine   [facility]

## 2020-12-06 NOTE — NURSING
Dr Tovar notified of the following: Hi Dr Tovar. Mr Bowie, Rm 322, admitted with chf, heart rate has been hanging around 125 for few hours. Been in afib/flutter since admission 12/04. Hasn't had any chest pain. Having MARIA/DCCV Monday.

## 2020-12-06 NOTE — SUBJECTIVE & OBJECTIVE
Interval History:  Is patient states he is feeling somewhat better today.  Breathing improved but still with dry cough and pain coughing.    Review of Systems   Constitutional: Negative for chills and fever.   HENT: Negative for congestion and sore throat.    Respiratory: Positive for cough and wheezing. Negative for shortness of breath.    Cardiovascular: Positive for chest pain. Negative for palpitations and leg swelling.   Gastrointestinal: Negative for abdominal pain, constipation, diarrhea, nausea and vomiting.   Genitourinary: Negative for dysuria.   Musculoskeletal: Negative for arthralgias and back pain.   Neurological: Negative for dizziness, numbness and headaches.   Psychiatric/Behavioral: Negative for agitation and confusion.     Objective:     Vital Signs (Most Recent):  Temp: 98.2 °F (36.8 °C) (12/06/20 1130)  Pulse: (!) 119 (12/06/20 1130)  Resp: 20 (12/06/20 1130)  BP: 131/78 (12/06/20 1130)  SpO2: 97 % (12/06/20 1130) Vital Signs (24h Range):  Temp:  [97.6 °F (36.4 °C)-99.3 °F (37.4 °C)] 98.2 °F (36.8 °C)  Pulse:  [106-133] 119  Resp:  [16-20] 20  SpO2:  [95 %-100 %] 97 %  BP: (115-131)/(76-90) 131/78     Weight: 103.9 kg (229 lb 0.9 oz)  Body mass index is 31.95 kg/m².    Intake/Output Summary (Last 24 hours) at 12/6/2020 1343  Last data filed at 12/6/2020 1100  Gross per 24 hour   Intake 840 ml   Output 3101 ml   Net -2261 ml      Physical Exam  Vitals signs and nursing note reviewed.   Constitutional:       General: He is not in acute distress.     Appearance: Normal appearance. He is not ill-appearing.   HENT:      Head: Normocephalic.      Nose: No congestion.      Mouth/Throat:      Pharynx: Oropharynx is clear.   Eyes:      General: No scleral icterus.     Conjunctiva/sclera: Conjunctivae normal.   Neck:      Musculoskeletal: Neck supple.   Cardiovascular:      Rate and Rhythm: Normal rate and regular rhythm.      Pulses: Normal pulses.   Pulmonary:      Effort: Pulmonary effort is normal.       Comments: Mild crackles at bases and significant expiratory wheezing  Abdominal:      General: Bowel sounds are normal. There is no distension.      Tenderness: There is no abdominal tenderness.   Musculoskeletal: Normal range of motion.      Comments: Mild nonpitting edema to b/l lower extremities   Skin:     General: Skin is warm and dry.   Neurological:      General: No focal deficit present.      Mental Status: He is alert and oriented to person, place, and time.   Psychiatric:         Mood and Affect: Mood normal.         Thought Content: Thought content normal.         Judgment: Judgment normal.         Significant Labs: All pertinent labs within the past 24 hours have been reviewed.    Significant Imaging: I have reviewed all pertinent imaging results/findings within the past 24 hours.

## 2020-12-06 NOTE — HOSPITAL COURSE
12/4 adm with resp failure and AFL  12/7 successful MARIA/DCCV    Interval Hx: SOB improving, no cp/palps/LH.    Tele: SR 70s (personally reviewed and interpreted)

## 2020-12-06 NOTE — ASSESSMENT & PLAN NOTE
Patient gives a history a dry cough with shortness of breath and is now hypoxic.  He does have evidence of volume overload however on CT a it showed that he had multifocal patchy opacities in right lower.  More likely to be atypical in nature given his symptoms.  COVID negative.  Started doxy cycline however pro calcitonin and.  Continue for total of 5 days considering exam findings and clinical presentation.  Wean off oxygen as tolerated

## 2020-12-06 NOTE — ASSESSMENT & PLAN NOTE
EKG reviewed and noted wide complex tachycardia, not consistent VT.  Reviewed older EKGs and there are changes.    On admission, patient appears to be in atrial flutter/fibrillation with an irregular rhythm on telemetry but rate is 80s. Will continue to monitor, patient states he's been compliant with his home medications.   Cardiology consulted   Patient started on Eliquis 5 mg p.o. b.i.d. and plans for cardioversion tomorrow 12/07/2020  Started on metoprolol 25 b.i.d. for rate control

## 2020-12-06 NOTE — ASSESSMENT & PLAN NOTE
Patient has a history of low normal EF and diastolic dysfunction.  Last echocardiogram 1 week ago showed improved EF and no diastolic heart dysfunction.  Concern for some mild volume overload at this time.  Will continue with IV diuresis to see if this will improve his symptoms.  Patient reports compliance with his home medication

## 2020-12-06 NOTE — NURSING
Received report from night nurse JOYCE Joseph. Patient comfortable in bed at time of report with no signs of acute distress noted. Safety precautions maintained, will continue to monitor.

## 2020-12-06 NOTE — PLAN OF CARE
Problem: Fall Injury Risk  Goal: Absence of Fall and Fall-Related Injury  Outcome: Ongoing, Progressing     Problem: Adult Inpatient Plan of Care  Goal: Plan of Care Review  Outcome: Ongoing, Progressing  Goal: Patient-Specific Goal (Individualization)  Outcome: Ongoing, Progressing  Goal: Absence of Hospital-Acquired Illness or Injury  Outcome: Ongoing, Progressing  Goal: Optimal Comfort and Wellbeing  Outcome: Ongoing, Progressing  Goal: Readiness for Transition of Care  Outcome: Ongoing, Progressing  Goal: Rounds/Family Conference  Outcome: Ongoing, Progressing     Problem: Diabetes Comorbidity  Goal: Blood Glucose Level Within Desired Range  Outcome: Ongoing, Progressing     Problem: Adjustment to Illness (Sepsis/Septic Shock)  Goal: Optimal Coping  Outcome: Ongoing, Progressing     Problem: Bleeding (Sepsis/Septic Shock)  Goal: Absence of Bleeding  Outcome: Ongoing, Progressing     Problem: Glycemic Control Impaired (Sepsis/Septic Shock)  Goal: Blood Glucose Level Within Desired Range  Outcome: Ongoing, Progressing     Problem: Hemodynamic Instability (Sepsis/Septic Shock)  Goal: Effective Tissue Perfusion  Outcome: Ongoing, Progressing     Problem: Infection (Sepsis/Septic Shock)  Goal: Absence of Infection Signs/Symptoms  Outcome: Ongoing, Progressing     Problem: Nutrition Impaired (Sepsis/Septic Shock)  Goal: Optimal Nutrition Intake  Outcome: Ongoing, Progressing     Problem: Respiratory Compromise (Sepsis/Septic Shock)  Goal: Effective Oxygenation and Ventilation  Outcome: Ongoing, Progressing     Problem: Electrolyte Imbalance (Acute Kidney Injury/Impairment)  Goal: Serum Electrolyte Balance  Outcome: Ongoing, Progressing     Problem: Fluid Imbalance (Acute Kidney Injury/Impairment)  Goal: Optimal Fluid Balance  Outcome: Ongoing, Progressing     Problem: Hematologic Alteration (Acute Kidney Injury/Impairment)  Goal: Hemoglobin, Hematocrit and Platelets Within Normal Range  Outcome: Ongoing,  Progressing     Problem: Oral Intake Inadequate (Acute Kidney Injury/Impairment)  Goal: Optimal Nutrition Intake  Outcome: Ongoing, Progressing     Problem: Renal Function Impairment (Acute Kidney Injury/Impairment)  Goal: Effective Renal Function  Outcome: Ongoing, Progressing     Problem: Fluid Imbalance (Pneumonia)  Goal: Fluid Balance  Outcome: Ongoing, Progressing     Problem: Infection (Pneumonia)  Goal: Resolution of Infection Signs/Symptoms  Outcome: Ongoing, Progressing     Problem: Respiratory Compromise (Pneumonia)  Goal: Effective Oxygenation and Ventilation  Outcome: Ongoing, Progressing

## 2020-12-07 ENCOUNTER — ANESTHESIA EVENT (OUTPATIENT)
Dept: CARDIOLOGY | Facility: HOSPITAL | Age: 70
DRG: 291 | End: 2020-12-07
Payer: MEDICARE

## 2020-12-07 ENCOUNTER — ANESTHESIA (OUTPATIENT)
Dept: CARDIOLOGY | Facility: HOSPITAL | Age: 70
DRG: 291 | End: 2020-12-07
Payer: MEDICARE

## 2020-12-07 PROBLEM — I48.92 ATRIAL FLUTTER: Status: ACTIVE | Noted: 2020-12-07

## 2020-12-07 LAB
ANION GAP SERPL CALC-SCNC: 8 MMOL/L (ref 8–16)
BASOPHILS # BLD AUTO: 0.04 K/UL (ref 0–0.2)
BASOPHILS NFR BLD: 0.4 % (ref 0–1.9)
BSA FOR ECHO PROCEDURE: 2.28 M2
BUN SERPL-MCNC: 24 MG/DL (ref 8–23)
CALCIUM SERPL-MCNC: 8.8 MG/DL (ref 8.7–10.5)
CHLORIDE SERPL-SCNC: 95 MMOL/L (ref 95–110)
CO2 SERPL-SCNC: 35 MMOL/L (ref 23–29)
CREAT SERPL-MCNC: 1.8 MG/DL (ref 0.5–1.4)
DIFFERENTIAL METHOD: ABNORMAL
EOSINOPHIL # BLD AUTO: 0 K/UL (ref 0–0.5)
EOSINOPHIL NFR BLD: 0.2 % (ref 0–8)
ERYTHROCYTE [DISTWIDTH] IN BLOOD BY AUTOMATED COUNT: 13.2 % (ref 11.5–14.5)
EST. GFR  (AFRICAN AMERICAN): 43 ML/MIN/1.73 M^2
EST. GFR  (NON AFRICAN AMERICAN): 37 ML/MIN/1.73 M^2
GLUCOSE SERPL-MCNC: 299 MG/DL (ref 70–110)
HCT VFR BLD AUTO: 27.1 % (ref 40–54)
HGB BLD-MCNC: 9.1 G/DL (ref 14–18)
IMM GRANULOCYTES # BLD AUTO: 0.04 K/UL (ref 0–0.04)
IMM GRANULOCYTES NFR BLD AUTO: 0.4 % (ref 0–0.5)
LYMPHOCYTES # BLD AUTO: 0.7 K/UL (ref 1–4.8)
LYMPHOCYTES NFR BLD: 7.5 % (ref 18–48)
MAGNESIUM SERPL-MCNC: 1.5 MG/DL (ref 1.6–2.6)
MCH RBC QN AUTO: 27.8 PG (ref 27–31)
MCHC RBC AUTO-ENTMCNC: 33.6 G/DL (ref 32–36)
MCV RBC AUTO: 83 FL (ref 82–98)
MONOCYTES # BLD AUTO: 0.8 K/UL (ref 0.3–1)
MONOCYTES NFR BLD: 8 % (ref 4–15)
NEUTROPHILS # BLD AUTO: 8.1 K/UL (ref 1.8–7.7)
NEUTROPHILS NFR BLD: 83.5 % (ref 38–73)
NRBC BLD-RTO: 0 /100 WBC
PLATELET # BLD AUTO: 332 K/UL (ref 150–350)
PMV BLD AUTO: 9.7 FL (ref 9.2–12.9)
POCT GLUCOSE: 211 MG/DL (ref 70–110)
POCT GLUCOSE: 241 MG/DL (ref 70–110)
POCT GLUCOSE: 259 MG/DL (ref 70–110)
POCT GLUCOSE: 297 MG/DL (ref 70–110)
POTASSIUM SERPL-SCNC: 3.6 MMOL/L (ref 3.5–5.1)
RBC # BLD AUTO: 3.27 M/UL (ref 4.6–6.2)
SINUS: 3.25 CM
SODIUM SERPL-SCNC: 138 MMOL/L (ref 136–145)
WBC # BLD AUTO: 9.74 K/UL (ref 3.9–12.7)

## 2020-12-07 PROCEDURE — 25000003 PHARM REV CODE 250: Performed by: STUDENT IN AN ORGANIZED HEALTH CARE EDUCATION/TRAINING PROGRAM

## 2020-12-07 PROCEDURE — 63600175 PHARM REV CODE 636 W HCPCS: Performed by: NURSE ANESTHETIST, CERTIFIED REGISTERED

## 2020-12-07 PROCEDURE — 92960 PR CARDIOVERSION, ELECTIVE;EXTERN: ICD-10-PCS | Mod: ,,, | Performed by: INTERNAL MEDICINE

## 2020-12-07 PROCEDURE — 21400001 HC TELEMETRY ROOM

## 2020-12-07 PROCEDURE — 94640 AIRWAY INHALATION TREATMENT: CPT

## 2020-12-07 PROCEDURE — 92960 CARDIOVERSION ELECTRIC EXT: CPT | Performed by: INTERNAL MEDICINE

## 2020-12-07 PROCEDURE — D9220A PRA ANESTHESIA: Mod: ANES,,, | Performed by: ANESTHESIOLOGY

## 2020-12-07 PROCEDURE — D9220A PRA ANESTHESIA: Mod: CRNA,,, | Performed by: NURSE ANESTHETIST, CERTIFIED REGISTERED

## 2020-12-07 PROCEDURE — 85025 COMPLETE CBC W/AUTO DIFF WBC: CPT

## 2020-12-07 PROCEDURE — 80048 BASIC METABOLIC PNL TOTAL CA: CPT

## 2020-12-07 PROCEDURE — 37000009 HC ANESTHESIA EA ADD 15 MINS: Performed by: INTERNAL MEDICINE

## 2020-12-07 PROCEDURE — D9220A PRA ANESTHESIA: ICD-10-PCS | Mod: ANES,,, | Performed by: ANESTHESIOLOGY

## 2020-12-07 PROCEDURE — 83735 ASSAY OF MAGNESIUM: CPT

## 2020-12-07 PROCEDURE — 25000003 PHARM REV CODE 250: Performed by: INTERNAL MEDICINE

## 2020-12-07 PROCEDURE — 94761 N-INVAS EAR/PLS OXIMETRY MLT: CPT

## 2020-12-07 PROCEDURE — 25000242 PHARM REV CODE 250 ALT 637 W/ HCPCS: Performed by: STUDENT IN AN ORGANIZED HEALTH CARE EDUCATION/TRAINING PROGRAM

## 2020-12-07 PROCEDURE — 37000008 HC ANESTHESIA 1ST 15 MINUTES: Performed by: INTERNAL MEDICINE

## 2020-12-07 PROCEDURE — 36415 COLL VENOUS BLD VENIPUNCTURE: CPT

## 2020-12-07 PROCEDURE — D9220A PRA ANESTHESIA: ICD-10-PCS | Mod: CRNA,,, | Performed by: NURSE ANESTHETIST, CERTIFIED REGISTERED

## 2020-12-07 PROCEDURE — 92960 CARDIOVERSION ELECTRIC EXT: CPT | Mod: ,,, | Performed by: INTERNAL MEDICINE

## 2020-12-07 PROCEDURE — 25000003 PHARM REV CODE 250: Performed by: NURSE ANESTHETIST, CERTIFIED REGISTERED

## 2020-12-07 PROCEDURE — 63600175 PHARM REV CODE 636 W HCPCS: Performed by: STUDENT IN AN ORGANIZED HEALTH CARE EDUCATION/TRAINING PROGRAM

## 2020-12-07 RX ORDER — SODIUM CHLORIDE 9 MG/ML
INJECTION, SOLUTION INTRAVENOUS CONTINUOUS PRN
Status: DISCONTINUED | OUTPATIENT
Start: 2020-12-07 | End: 2020-12-07

## 2020-12-07 RX ORDER — ETOMIDATE 2 MG/ML
INJECTION INTRAVENOUS
Status: DISCONTINUED | OUTPATIENT
Start: 2020-12-07 | End: 2020-12-07

## 2020-12-07 RX ORDER — PROPOFOL 10 MG/ML
VIAL (ML) INTRAVENOUS
Status: DISCONTINUED | OUTPATIENT
Start: 2020-12-07 | End: 2020-12-07

## 2020-12-07 RX ORDER — LIDOCAINE HCL/PF 100 MG/5ML
SYRINGE (ML) INTRAVENOUS
Status: DISCONTINUED | OUTPATIENT
Start: 2020-12-07 | End: 2020-12-07

## 2020-12-07 RX ORDER — LANOLIN ALCOHOL/MO/W.PET/CERES
400 CREAM (GRAM) TOPICAL 2 TIMES DAILY
Status: DISCONTINUED | OUTPATIENT
Start: 2020-12-07 | End: 2020-12-11 | Stop reason: HOSPADM

## 2020-12-07 RX ADMIN — MAGNESIUM OXIDE TAB 400 MG (241.3 MG ELEMENTAL MG) 400 MG: 400 (241.3 MG) TAB at 08:12

## 2020-12-07 RX ADMIN — POTASSIUM PHOSPHATE, MONOBASIC 500 MG: 500 TABLET, SOLUBLE ORAL at 08:12

## 2020-12-07 RX ADMIN — ETOMIDATE 4 MG: 2 INJECTION, SOLUTION INTRAVENOUS at 02:12

## 2020-12-07 RX ADMIN — FLUTICASONE FUROATE AND VILANTEROL TRIFENATATE 1 PUFF: 200; 25 POWDER RESPIRATORY (INHALATION) at 08:12

## 2020-12-07 RX ADMIN — PROPOFOL 50 MG: 10 INJECTION, EMULSION INTRAVENOUS at 02:12

## 2020-12-07 RX ADMIN — METOPROLOL TARTRATE 25 MG: 25 TABLET, FILM COATED ORAL at 09:12

## 2020-12-07 RX ADMIN — Medication 100 MG: at 08:12

## 2020-12-07 RX ADMIN — APIXABAN 5 MG: 5 TABLET, FILM COATED ORAL at 08:12

## 2020-12-07 RX ADMIN — PROPOFOL 30 MG: 10 INJECTION, EMULSION INTRAVENOUS at 02:12

## 2020-12-07 RX ADMIN — FUROSEMIDE 40 MG: 10 INJECTION, SOLUTION INTRAVENOUS at 08:12

## 2020-12-07 RX ADMIN — SODIUM CHLORIDE: 0.9 INJECTION, SOLUTION INTRAVENOUS at 02:12

## 2020-12-07 RX ADMIN — DOXYCYCLINE 100 MG: 100 INJECTION, POWDER, LYOPHILIZED, FOR SOLUTION INTRAVENOUS at 09:12

## 2020-12-07 RX ADMIN — PREDNISONE 20 MG: 20 TABLET ORAL at 08:12

## 2020-12-07 RX ADMIN — THERA TABS 1 TABLET: TAB at 08:12

## 2020-12-07 RX ADMIN — INSULIN ASPART 2 UNITS: 100 INJECTION, SOLUTION INTRAVENOUS; SUBCUTANEOUS at 05:12

## 2020-12-07 RX ADMIN — ATORVASTATIN CALCIUM 80 MG: 40 TABLET, FILM COATED ORAL at 09:12

## 2020-12-07 RX ADMIN — MAGNESIUM OXIDE TAB 400 MG (241.3 MG ELEMENTAL MG) 400 MG: 400 (241.3 MG) TAB at 09:12

## 2020-12-07 RX ADMIN — DOXYCYCLINE 100 MG: 100 INJECTION, POWDER, LYOPHILIZED, FOR SOLUTION INTRAVENOUS at 10:12

## 2020-12-07 RX ADMIN — LEVALBUTEROL HYDROCHLORIDE 0.63 MG: 0.63 SOLUTION RESPIRATORY (INHALATION) at 11:12

## 2020-12-07 RX ADMIN — INSULIN ASPART 3 UNITS: 100 INJECTION, SOLUTION INTRAVENOUS; SUBCUTANEOUS at 08:12

## 2020-12-07 RX ADMIN — ETOMIDATE 6 MG: 2 INJECTION, SOLUTION INTRAVENOUS at 02:12

## 2020-12-07 RX ADMIN — APIXABAN 5 MG: 5 TABLET, FILM COATED ORAL at 09:12

## 2020-12-07 RX ADMIN — BENZOCAINE, BUTAMBEN, AND TETRACAINE HYDROCHLORIDE 1 SPRAY: .028; .004; .004 AEROSOL, SPRAY TOPICAL at 02:12

## 2020-12-07 RX ADMIN — FUROSEMIDE 40 MG: 10 INJECTION, SOLUTION INTRAVENOUS at 09:12

## 2020-12-07 RX ADMIN — METOPROLOL TARTRATE 25 MG: 25 TABLET, FILM COATED ORAL at 08:12

## 2020-12-07 RX ADMIN — FENOFIBRATE 160 MG: 160 TABLET ORAL at 08:12

## 2020-12-07 RX ADMIN — INSULIN ASPART 1 UNITS: 100 INJECTION, SOLUTION INTRAVENOUS; SUBCUTANEOUS at 09:12

## 2020-12-07 RX ADMIN — LEVALBUTEROL HYDROCHLORIDE 0.63 MG: 0.63 SOLUTION RESPIRATORY (INHALATION) at 03:12

## 2020-12-07 RX ADMIN — LIDOCAINE HYDROCHLORIDE 100 MG: 20 INJECTION, SOLUTION INTRAVENOUS at 02:12

## 2020-12-07 RX ADMIN — LEVALBUTEROL HYDROCHLORIDE 0.63 MG: 0.63 SOLUTION RESPIRATORY (INHALATION) at 08:12

## 2020-12-07 NOTE — TRANSFER OF CARE
"Anesthesia Transfer of Care Note    Patient: Chato Bowie    Procedure(s) Performed: Procedure(s) (LRB):  Transesophageal echo (MARIA) intra-procedure log documentation (N/A)  Cardioversion or Defibrillation (N/A)    Patient location: Cath Lab    Anesthesia Type: general    Transport from OR: Transported from OR on 2-3 L/min O2 by NC with adequate spontaneous ventilation    Post pain: adequate analgesia    Post assessment: no apparent anesthetic complications and tolerated procedure well    Post vital signs: stable    Level of consciousness: sedated and responds to stimulation    Nausea/Vomiting: no nausea/vomiting    Complications: none    Transfer of care protocol was followed      Last vitals:   Visit Vitals  /71 (BP Location: Right arm, Patient Position: Lying)   Pulse 79   Temp 36.4 °C (97.5 °F) (Skin)   Resp 11   Ht 5' 11" (1.803 m)   Wt 103.9 kg (229 lb 0.9 oz)   SpO2 96%   BMI 31.95 kg/m²     "

## 2020-12-07 NOTE — PLAN OF CARE
Problem: Fall Injury Risk  Goal: Absence of Fall and Fall-Related Injury  Outcome: Ongoing, Progressing  Intervention: Identify and Manage Contributors to Fall Injury Risk  Flowsheets (Taken 12/7/2020 1645)  Self-Care Promotion: independence encouraged  Medication Review/Management: medications reviewed     Problem: Adult Inpatient Plan of Care  Goal: Plan of Care Review  Outcome: Ongoing, Progressing  Goal: Patient-Specific Goal (Individualization)  Outcome: Ongoing, Progressing  Goal: Absence of Hospital-Acquired Illness or Injury  Outcome: Ongoing, Progressing  Intervention: Identify and Manage Fall Risk  Flowsheets (Taken 12/7/2020 1645)  Safety Promotion/Fall Prevention: assistive device/personal item within reach  Goal: Optimal Comfort and Wellbeing  Outcome: Ongoing, Progressing  Goal: Readiness for Transition of Care  Outcome: Ongoing, Progressing  Goal: Rounds/Family Conference  Outcome: Ongoing, Progressing     Problem: Diabetes Comorbidity  Goal: Blood Glucose Level Within Desired Range  Outcome: Ongoing, Progressing     Problem: Adjustment to Illness (Sepsis/Septic Shock)  Goal: Optimal Coping  Outcome: Ongoing, Progressing     Problem: Bleeding (Sepsis/Septic Shock)  Goal: Absence of Bleeding  Outcome: Ongoing, Progressing     Problem: Glycemic Control Impaired (Sepsis/Septic Shock)  Goal: Blood Glucose Level Within Desired Range  Outcome: Ongoing, Progressing     Problem: Hemodynamic Instability (Sepsis/Septic Shock)  Goal: Effective Tissue Perfusion  Outcome: Ongoing, Progressing     Problem: Infection (Sepsis/Septic Shock)  Goal: Absence of Infection Signs/Symptoms  Outcome: Ongoing, Progressing     Problem: Nutrition Impaired (Sepsis/Septic Shock)  Goal: Optimal Nutrition Intake  Outcome: Ongoing, Progressing     Problem: Respiratory Compromise (Sepsis/Septic Shock)  Goal: Effective Oxygenation and Ventilation  Outcome: Ongoing, Progressing     Problem: Electrolyte Imbalance (Acute Kidney  Injury/Impairment)  Goal: Serum Electrolyte Balance  Outcome: Ongoing, Progressing     Problem: Fluid Imbalance (Acute Kidney Injury/Impairment)  Goal: Optimal Fluid Balance  Outcome: Ongoing, Progressing     Problem: Hematologic Alteration (Acute Kidney Injury/Impairment)  Goal: Hemoglobin, Hematocrit and Platelets Within Normal Range  Outcome: Ongoing, Progressing     Problem: Oral Intake Inadequate (Acute Kidney Injury/Impairment)  Goal: Optimal Nutrition Intake  Outcome: Ongoing, Progressing     Problem: Renal Function Impairment (Acute Kidney Injury/Impairment)  Goal: Effective Renal Function  Outcome: Ongoing, Progressing     Problem: Fluid Imbalance (Pneumonia)  Goal: Fluid Balance  Outcome: Ongoing, Progressing     Problem: Infection (Pneumonia)  Goal: Resolution of Infection Signs/Symptoms  Outcome: Ongoing, Progressing     Problem: Respiratory Compromise (Pneumonia)  Goal: Effective Oxygenation and Ventilation  Outcome: Ongoing, Progressing

## 2020-12-07 NOTE — PLAN OF CARE
12/07/20 1650   Medicare Message   Important Message from Medicare regarding Discharge Appeal Rights Given to patient/caregiver;Explained to patient/caregiver;Signed/date by patient/caregiver   Date IMM was signed 12/07/20   Time IMM was signed [id number]

## 2020-12-07 NOTE — NURSING
Gave report to night nurse JOYCE Macario. Patient comfortable in bed at time of report with no signs of acute distress. Safety precautions maintained.

## 2020-12-07 NOTE — ASSESSMENT & PLAN NOTE
Patient presented with dry cough and persistent shortness of breath.  Became hypoxic and went for CTA which showed no evidence of a pulmonary embolism but noted patchy multifocal opacities within the right lower lobe as well as small bilateral pleural effusions with mild dependent consolidations loss compressive atelectasis.  Patient does not appear in distress but was a non-rebreather mask.  Lung exam were consistent with volume overload and some expiratory wheezing  There is concern for some sort of infectious process.  Start patient on doxycycline and check procalcitonin.  Procalcitonin only 0.11.  Will continue with IV diuresis and supplemental oxygen to wean as tolerated.  COVID negative.  Also with peribronchial thickening which is nonspecific.  Will give nebulizers for shortness of breath and wheezing.  Having more wheezing today, started on prednisone and inhalers.  Oxygen requirements decreased and wheezing improved with addition of prednisone. Continue to wean off oxygen as tolerated.

## 2020-12-07 NOTE — PLAN OF CARE
Pt slept throughout evening.  No complaints of pain.     Pt continues to have dry cough, pt denies pain while coughing.    Pt afib on telemetry 110-119.  Pt titrated from 5 L NC to 7 L NC, sating in high 90s.    Pt voiding per urinal.    Pt compliant with NPO@0000 for MARIA in AM.

## 2020-12-07 NOTE — ASSESSMENT & PLAN NOTE
EKG reviewed and noted wide complex tachycardia, not consistent VT.  Reviewed older EKGs and there are changes.    On admission, patient appears to be in atrial flutter/fibrillation with an irregular rhythm on telemetry but rate is 80s. Will continue to monitor, patient states he's been compliant with his home medications.   Cardiology consulted   Patient started on Eliquis 5 mg p.o. b.i.d. and cardioversion today  Started on metoprolol 25 b.i.d. for rate control

## 2020-12-07 NOTE — ANESTHESIA PREPROCEDURE EVALUATION
12/07/2020    Pre-operative evaluation for Procedure(s) (LRB):  Transesophageal echo (MARIA) intra-procedure log documentation (N/A)    Chato Bowie is a 70 y.o. male     Patient Active Problem List   Diagnosis    Abnormal EKG    Epigastric abdominal pain    Paroxysmal atrial fibrillation    Sepsis    Intractable abdominal pain    Fever    Intractable vomiting with nausea    CAD (coronary artery disease)    Acute renal failure    Essential hypertension    Hyperlipidemia    Type 2 diabetes mellitus, uncontrolled    Anemia of chronic disease    Mild protein malnutrition    GERD (gastroesophageal reflux disease)    Cystitis    Hypokalemia    Alcohol abuse    Acute on chronic diastolic congestive heart failure    Acute pain of left knee    NSTEMI (non-ST elevated myocardial infarction)    Acute renal failure superimposed on stage 3 chronic kidney disease    CKD (chronic kidney disease), stage III    Hypomagnesemia    Chronic combined systolic and diastolic congestive heart failure    BPH (benign prostatic hyperplasia)    Aortic stenosis, moderate    Type 2 diabetes mellitus with hyperglycemia    Acute hypoxemic respiratory failure    Pneumonia of right lower lobe due to infectious organism    Other chest pain       Review of patient's allergies indicates:  No Known Allergies    No current facility-administered medications on file prior to encounter.      Current Outpatient Medications on File Prior to Encounter   Medication Sig Dispense Refill    amlodipine (NORVASC) 10 MG tablet Take 10 mg by mouth once daily.      aspirin (ECOTRIN) 81 MG EC tablet Take 1 tablet (81 mg total) by mouth once daily.  0    aspirin-calcium carbonate 81 mg-300 mg calcium(777 mg) Tab Take 81 mg by mouth.      atorvastatin (LIPITOR) 80 MG tablet Take 80 mg by mouth every evening.      atropine 1%  (ISOPTO ATROPINE) 1 % Drop INT 1 GTT INTO OD QD FOR 1 WK      carvedilol (COREG) 12.5 MG tablet Take 1 tablet (12.5 mg total) by mouth 2 (two) times daily. (Patient taking differently: Take 25 mg by mouth 2 (two) times daily. ) 60 tablet 0    colchicine (COLCRYS) 0.6 mg tablet Take 1 tablet (0.6 mg total) by mouth once daily. for 14 days 14 tablet 0    DUREZOL 0.05 % Drop ophthalmic solution INT 1 GTT INTO OD FID FOR 3 WEEKS      fenofibrate 160 MG Tab Take 160 mg by mouth.      folic acid (FOLVITE) 1 MG tablet Take 1 mg by mouth.      furosemide (LASIX) 40 MG tablet Take 1 tablet (40 mg total) by mouth once daily. 30 tablet 0    hydrALAZINE (APRESOLINE) 50 MG tablet Take 1 tablet (50 mg total) by mouth 3 (three) times daily. 90 tablet 0    insulin syringe-needle U-100 1 mL 31 gauge x 5/16 Syrg USE TO INJECT INSULIN TWICE A DAY      lansoprazole (PREVACID) 30 MG capsule Take 30 mg by mouth once daily.      magnesium oxide (MAG-OX) 400 mg (241.3 mg magnesium) tablet Take 800 mg by mouth.      multivitamin Tab Take 1 tablet by mouth once daily.      NOVOLOG MIX 70-30 U-100 INSULN 100 unit/mL (70-30) Soln Inject 10 Units into the skin before meals as needed.      pantoprazole (PROTONIX) 40 MG tablet Take 1 tablet (40 mg total) by mouth 2 (two) times daily. 60 tablet 1    rosuvastatin (CRESTOR) 40 MG Tab Take 10 mg by mouth once daily.      sildenafil (VIAGRA) 100 MG tablet Take 1 tablet (100 mg total) by mouth daily as needed for Erectile Dysfunction. *generic tabs* 30 tablet 11    tamsulosin (FLOMAX) 0.4 mg Cap Take 1 capsule (0.4 mg total) by mouth once daily. 30 capsule 11    terbinafine HCl (LAMISIL) 250 mg tablet       thiamine 100 MG tablet Take 1 tablet (100 mg total) by mouth once daily.         Past Surgical History:   Procedure Laterality Date    EYE SURGERY      VASCULAR SURGERY         Social History     Socioeconomic History    Marital status: Single     Spouse name: Not on file     Number of children: Not on file    Years of education: Not on file    Highest education level: Not on file   Occupational History    Not on file   Social Needs    Financial resource strain: Not on file    Food insecurity     Worry: Not on file     Inability: Not on file    Transportation needs     Medical: Not on file     Non-medical: Not on file   Tobacco Use    Smoking status: Never Smoker    Smokeless tobacco: Never Used   Substance and Sexual Activity    Alcohol use: Yes     Alcohol/week: 4.0 standard drinks     Types: 4 Cans of beer per week    Drug use: No    Sexual activity: Not on file   Lifestyle    Physical activity     Days per week: Not on file     Minutes per session: Not on file    Stress: Not on file   Relationships    Social connections     Talks on phone: Not on file     Gets together: Not on file     Attends [employer] service: Not on file     Active member of club or organization: Not on file     Attends meetings of clubs or organizations: Not on file     Relationship status: Not on file   Other Topics Concern    Not on file   Social History Narrative    Not on file         CBC:   Recent Labs     20  0505 20  0430   WBC 9.45 9.74   RBC 3.19* 3.27*   HGB 9.0* 9.1*   HCT 27.6* 27.1*    332   MCV 87 83   MCH 28.2 27.8   MCHC 32.6 33.6       CMP:   Recent Labs     20  2323  20  0505 20  0429      < > 139 138   K 2.6*   < > 3.2* 3.6   CL 98   < > 95 95   CO2 26   < > 32* 35*   BUN 19   < > 20 24*   CREATININE 1.7*   < > 1.8* 1.8*   *   < > 184* 299*   MG 1.4*  --  1.6 1.5*   CALCIUM 8.1*   < > 8.5* 8.8   ALBUMIN 3.0*  --   --   --    PROT 6.5  --   --   --    ALKPHOS 58  --   --   --    ALT 26  --   --   --    AST 29  --   --   --    BILITOT 0.6  --   --   --     < > = values in this interval not displayed.       INR  No results for input(s): PT, INR, PROTIME, APTT in the last 72 hours.        Diagnostic Studies:      EKD  Echo:  Results for orders placed or performed during the hospital encounter of 03/09/17   2D echo with color flow doppler   Result Value Ref Range    QEF 55 55 - 65    Diastolic Dysfunction Yes (A)     Aortic Valve Regurgitation MILD     Aortic Valve Stenosis MILD (A)     Est. PA Systolic Pressure 15.53          Anesthesia Evaluation    I have reviewed the Patient Summary Reports.    I have reviewed the NPO Status.   I have reviewed the Medications.     Review of Systems  Anesthesia Hx:  No previous Anesthesia   Social:  Non-Smoker    Hematology/Oncology:  Hematology Normal   Oncology Normal     EENT/Dental:EENT/Dental Normal   Cardiovascular:   Hypertension Past MI CAD   CHF 11/20-stress test neg for ischemia..45 ef..echo 55 perc ef mod aortic stenosis 1.6 cm sq area ..with aortic regurg mod and mitral regurge mild  Past echo showed a a valve area of 1 cm2   Pulmonary:   Pneumonia Recent cxr shows active pulmonary edema   Renal/:   Chronic Renal Disease    Hepatic/GI:   GERD    Musculoskeletal:  Musculoskeletal Normal    Neurological:   Neuromuscular Disease,    Endocrine:   Diabetes    Dermatological:  Skin Normal    Psych:   Psychiatric History             Anesthesia Plan  Type of Anesthesia, risks & benefits discussed:  Anesthesia Type:  MAC  Patient's Preference:   Intra-op Monitoring Plan: standard ASA monitors  Intra-op Monitoring Plan Comments:   Post Op Pain Control Plan: multimodal analgesia, IV/PO Opioids PRN and per primary service following discharge from PACU  Post Op Pain Control Plan Comments:   Induction:    Beta Blocker:  Patient is not currently on a Beta-Blocker (No further documentation required).       Informed Consent: Patient understands risks and agrees with Anesthesia plan.  Questions answered. Anesthesia consent signed with patient.  ASA Score: 4     Day of Surgery Review of History & Physical:    H&P update referred to the provider.  H&P completed by Anesthesiologist.       Ready For  Surgery From Anesthesia Perspective.

## 2020-12-07 NOTE — NURSING
Received report from night nurse JOYCE Macario. Patient comfortable in bed at time of report with no signs of acute distress noted. Safety precautions maintained, will continue to monitor.

## 2020-12-07 NOTE — ASSESSMENT & PLAN NOTE
On NovoLog at home.  Will continue with a diabetic diet and sliding scale for now and adjust as needed.  Added Basal insulin

## 2020-12-07 NOTE — ANESTHESIA POSTPROCEDURE EVALUATION
Anesthesia Post Evaluation    Patient: Chato Bowie    Procedure(s) Performed: Procedure(s) (LRB):  Transesophageal echo (MARIA) intra-procedure log documentation (N/A)  Cardioversion or Defibrillation (N/A)    Final Anesthesia Type: general    Patient location during evaluation: PACU  Patient participation: Yes- Able to Participate  Level of consciousness: awake and alert and oriented  Post-procedure vital signs: reviewed and stable  Pain management: adequate  Airway patency: patent    PONV status at discharge: No PONV  Anesthetic complications: no      Cardiovascular status: blood pressure returned to baseline, hemodynamically stable and stable  Respiratory status: unassisted, spontaneous ventilation and room air  Hydration status: euvolemic  Follow-up not needed.          Vitals Value Taken Time   /71 12/07/20 1437   Temp 36.4 (97.5) 12/07/20 1437   Pulse 79 12/07/20 1437   Resp 11 12/07/20 1437   SpO2 96 12/07/20 1437         No case tracking events are documented in the log.      Pain/Avery Score: Avery Score: 8 (12/7/2020  2:35 PM)

## 2020-12-07 NOTE — ASSESSMENT & PLAN NOTE
Patient describes his chest pain is only when he coughs which has been going on for 1 week.  He also describes the pain in his abdomen.  Denies chest pain on exertion, EKG reviewed troponin downtrending.  Do not suspect ACS at this time.  Continue with aspirin and statin and symptom relief  Resolved

## 2020-12-07 NOTE — HOSPITAL COURSE
Admitted for chest pain, cough and hypoxemic respiratory failure. Found to be afib/flutter and concerned for pneumonia and volume overload. Started on antibiotics and IV diuresis. Cardiology consulted and MARIA/cardioversion on 12/7. TTE no pulmonary hypertension, normal EF and diastolic function. Still hypoxic. Started doxycycline, prednisone, and PO lasix.  Patient completed a course of oral doxycycline and prednisone for 5 days and this was discontinued at time of discharge.  He did have some increased hypoxemia on the day prior to discharge, which seemed to be related to worsening heart failure.  He received 60 mg of Lasix IV and diuresed well.  Home oxygen evaluation was done on the day of discharge, and he did not require any oxygen.

## 2020-12-07 NOTE — BRIEF OP NOTE
[facility]  Brief Operative Note     SUMMARY     Surgery Date: 12/7/2020     Surgeon(s) and Role:     * Indra Foote MD - Primary    Assisting Surgeon: None    Pre-op Diagnosis:  Paroxysmal atrial fibrillation [I48.0]    Post-op Diagnosis:  Post-Op Diagnosis Codes:     * Paroxysmal atrial fibrillation [I48.0]    Procedure(s) (LRB):  Transesophageal echo (MARIA) intra-procedure log documentation (N/A)  Cardioversion or Defibrillation (N/A)    Anesthesia: Monitor Anesthesia Care    Description of the findings of the procedure: Uneventful MARIA/DCCV with anesthesia    Findings/Key Components:   LVEF 60%  Normal RV fxn  Biatrial enlargement  No LA/VANESA thrombus  Mod MR  Mild-Mod AI  Mod AS, BASIL 1.5 cm2 (by planimetry)    Successful DCCV  BPM->SR 70 BPM 75J sync biphasic x1    Plan:  Cont eliquis 5mg bid  Dispo planning appropriate, pt to follow up with Dr. Llamas as planned.    Estimated Blood Loss: none         Specimens: None    Diet: ADA/cardiac    Activity: ad judy.

## 2020-12-07 NOTE — SUBJECTIVE & OBJECTIVE
Interval History: patient feeling better today, no more pain with coughing. Npo and planned for shabnam/cardioversion.    Review of Systems   Constitutional: Negative for chills and fever.   HENT: Negative for congestion and sore throat.    Respiratory: Positive for cough and wheezing. Negative for shortness of breath.    Cardiovascular: Negative for chest pain, palpitations and leg swelling.   Gastrointestinal: Negative for abdominal pain.   Genitourinary: Negative for dysuria.   Neurological: Negative for dizziness, numbness and headaches.     Objective:     Vital Signs (Most Recent):  Temp: 98.1 °F (36.7 °C) (12/07/20 1538)  Pulse: 81 (12/07/20 1538)  Resp: 18 (12/07/20 1538)  BP: 129/83 (12/07/20 1538)  SpO2: 97 % (12/07/20 1538) Vital Signs (24h Range):  Temp:  [97 °F (36.1 °C)-98.4 °F (36.9 °C)] 98.1 °F (36.7 °C)  Pulse:  [] 81  Resp:  [11-20] 18  SpO2:  [87 %-100 %] 97 %  BP: (124-138)/(71-97) 129/83     Weight: 103.9 kg (229 lb 0.9 oz)  Body mass index is 31.95 kg/m².    Intake/Output Summary (Last 24 hours) at 12/7/2020 1658  Last data filed at 12/7/2020 1200  Gross per 24 hour   Intake 490 ml   Output 2750 ml   Net -2260 ml      Physical Exam  Vitals signs and nursing note reviewed.   Constitutional:       General: He is not in acute distress.     Appearance: Normal appearance. He is not ill-appearing.   HENT:      Head: Normocephalic.      Nose: No congestion.      Mouth/Throat:      Pharynx: Oropharynx is clear.   Eyes:      General: No scleral icterus.     Conjunctiva/sclera: Conjunctivae normal.   Neck:      Musculoskeletal: Neck supple.   Cardiovascular:      Rate and Rhythm: Normal rate and regular rhythm.      Pulses: Normal pulses.   Pulmonary:      Effort: Pulmonary effort is normal.      Comments: Wheezing significantly improved  Abdominal:      General: Bowel sounds are normal. There is no distension.      Tenderness: There is no abdominal tenderness.   Musculoskeletal: Normal range of motion.       Comments: Mild nonpitting edema to b/l lower extremities   Skin:     General: Skin is warm and dry.   Neurological:      General: No focal deficit present.      Mental Status: He is alert and oriented to person, place, and time.   Psychiatric:         Mood and Affect: Mood normal.         Thought Content: Thought content normal.         Judgment: Judgment normal.         Significant Labs: All pertinent labs within the past 24 hours have been reviewed.    Significant Imaging: I have reviewed all pertinent imaging results/findings within the past 24 hours.

## 2020-12-07 NOTE — ASSESSMENT & PLAN NOTE
Patient gives a history a dry cough with shortness of breath and is now hypoxic.  He does have evidence of volume overload however on CT a it showed that he had multifocal patchy opacities in right lower.  More likely to be atypical in nature given his symptoms.  COVID negative.  Started doxy cycline however pro calcitonin low.  Continue for total of 5 days considering exam findings and clinical presentation.  Wean off oxygen as tolerated

## 2020-12-07 NOTE — PROGRESS NOTES
[facility] Hospital Medicine  Progress Note    Patient Name: Chato Bowie  MRN: [id number]  Patient Class: IP- Inpatient   Admission Date: 12/4/2020  Length of Stay: 2 days  Attending Physician: Sea Phelan MD  Primary Care Provider: [facility]        Subjective:     Principal Problem:Acute hypoxemic respiratory failure        HPI:  Mr. Marie is a 70-year-old male with a past medical history of coronary artery disease, hypertension, diabetes, BPH, alcohol use who presents after he has been having a cough for 1 week.  The patient was recently admitted for chest pain and underwent nuclear stress test.  She had a fixed defect the patient was discharged with follow-up.  The patient states that ever since he was discharged, he has been having a dry cough and it has gotten so bad that it makes his chest hurt.  He is not producing any sputum.  Otherwise, he does not have chest pain on exertion.  Only when he coughs.  He denies fevers or chills but endorses shortness of breath with exertion in anything that he does.  He is not able to lie flat.  His lower extremities do have some swelling he states that this is his baseline and not worse than usual.  He denies smoking and denies any history of lung disease.  However he does believe that he may have some sort of asthma. The patient states he has not had any alcohol since he was discharged last week.    Overview/Hospital Course:  No notes on file    Interval History: patient feeling better today, no more pain with coughing. Npo and planned for shabnam/cardioversion.    Review of Systems   Constitutional: Negative for chills and fever.   HENT: Negative for congestion and sore throat.    Respiratory: Positive for cough and wheezing. Negative for shortness of breath.    Cardiovascular: Negative for chest pain, palpitations and leg swelling.   Gastrointestinal: Negative for abdominal pain.   Genitourinary: Negative for dysuria.   Neurological:  Negative for dizziness, numbness and headaches.     Objective:     Vital Signs (Most Recent):  Temp: 98.1 °F (36.7 °C) (12/07/20 1538)  Pulse: 81 (12/07/20 1538)  Resp: 18 (12/07/20 1538)  BP: 129/83 (12/07/20 1538)  SpO2: 97 % (12/07/20 1538) Vital Signs (24h Range):  Temp:  [97 °F (36.1 °C)-98.4 °F (36.9 °C)] 98.1 °F (36.7 °C)  Pulse:  [] 81  Resp:  [11-20] 18  SpO2:  [87 %-100 %] 97 %  BP: (124-138)/(71-97) 129/83     Weight: 103.9 kg (229 lb 0.9 oz)  Body mass index is 31.95 kg/m².    Intake/Output Summary (Last 24 hours) at 12/7/2020 1658  Last data filed at 12/7/2020 1200  Gross per 24 hour   Intake 490 ml   Output 2750 ml   Net -2260 ml      Physical Exam  Vitals signs and nursing note reviewed.   Constitutional:       General: He is not in acute distress.     Appearance: Normal appearance. He is not ill-appearing.   HENT:      Head: Normocephalic.      Nose: No congestion.      Mouth/Throat:      Pharynx: Oropharynx is clear.   Eyes:      General: No scleral icterus.     Conjunctiva/sclera: Conjunctivae normal.   Neck:      Musculoskeletal: Neck supple.   Cardiovascular:      Rate and Rhythm: Normal rate and regular rhythm.      Pulses: Normal pulses.   Pulmonary:      Effort: Pulmonary effort is normal.      Comments: Wheezing significantly improved  Abdominal:      General: Bowel sounds are normal. There is no distension.      Tenderness: There is no abdominal tenderness.   Musculoskeletal: Normal range of motion.      Comments: Mild nonpitting edema to b/l lower extremities   Skin:     General: Skin is warm and dry.   Neurological:      General: No focal deficit present.      Mental Status: He is alert and oriented to person, place, and time.   Psychiatric:         Mood and Affect: Mood normal.         Thought Content: Thought content normal.         Judgment: Judgment normal.         Significant Labs: All pertinent labs within the past 24 hours have been reviewed.    Significant Imaging: I have  reviewed all pertinent imaging results/findings within the past 24 hours.      Assessment/Plan:      * Acute hypoxemic respiratory failure  Patient presented with dry cough and persistent shortness of breath.  Became hypoxic and went for CTA which showed no evidence of a pulmonary embolism but noted patchy multifocal opacities within the right lower lobe as well as small bilateral pleural effusions with mild dependent consolidations loss compressive atelectasis.  Patient does not appear in distress but was a non-rebreather mask.  Lung exam were consistent with volume overload and some expiratory wheezing  There is concern for some sort of infectious process.  Start patient on doxycycline and check procalcitonin.  Procalcitonin only 0.11.  Will continue with IV diuresis and supplemental oxygen to wean as tolerated.  COVID negative.  Also with peribronchial thickening which is nonspecific.  Will give nebulizers for shortness of breath and wheezing.  Having more wheezing today, started on prednisone and inhalers.  Oxygen requirements decreased and wheezing improved with addition of prednisone. Continue to wean off oxygen as tolerated.      Other chest pain  Patient describes his chest pain is only when he coughs which has been going on for 1 week.  He also describes the pain in his abdomen.  Denies chest pain on exertion, EKG reviewed troponin downtrending.  Do not suspect ACS at this time.  Continue with aspirin and statin and symptom relief  Resolved      Pneumonia of right lower lobe due to infectious organism  Patient gives a history a dry cough with shortness of breath and is now hypoxic.  He does have evidence of volume overload however on CT a it showed that he had multifocal patchy opacities in right lower.  More likely to be atypical in nature given his symptoms.  COVID negative.  Started doxy cycline however pro calcitonin low.  Continue for total of 5 days considering exam findings and clinical presentation.   Wean off oxygen as tolerated      Aortic stenosis, moderate  Noted on previous echo      CKD (chronic kidney disease), stage III  Appears to be at baseline.  Will continue to monitor      Acute on chronic diastolic congestive heart failure  Patient has a history of low normal EF and diastolic dysfunction.  Last echocardiogram 1 week ago showed improved EF and no diastolic heart dysfunction.  Concern for some mild volume overload at this time.  Will continue with IV diuresis to see if this will improve his symptoms.  Patient reports compliance with his home medication      Alcohol abuse  Patient has a history of alcohol abuse however denies any recent used in the past week.  Not showing any signs or symptoms of withdrawal at this time.  Continue with thiamine and multivitamin      Hypokalemia  Will replace and monitor      GERD (gastroesophageal reflux disease)  Not on any medications at home.  Will monitor for now.      Anemia of chronic disease  Hemoglobin appears slightly lower than on previous admissions.  No evidence of bleeding at this time.  Will continue diuresis and monitor.      Type 2 diabetes mellitus, uncontrolled  On NovoLog at home.  Will continue with a diabetic diet and sliding scale for now and adjust as needed.  Added Basal insulin        Hyperlipidemia  Continue with home statin      Essential hypertension  Relatively normotensive at this time.  Madeleine loss.  Overnight and started on metoprolol 25 mg p.o. b.i.d..  Will continue to monitor.      CAD (coronary artery disease)  History of coronary artery disease.  Continue with aspirin statin      Paroxysmal atrial fibrillation  EKG reviewed and noted wide complex tachycardia, not consistent VT.  Reviewed older EKGs and there are changes.    On admission, patient appears to be in atrial flutter/fibrillation with an irregular rhythm on telemetry but rate is 80s. Will continue to monitor, patient states he's been compliant with his home medications.    Cardiology consulted   Patient started on Eliquis 5 mg p.o. b.i.d. and cardioversion today  Started on metoprolol 25 b.i.d. for rate control        VTE Risk Mitigation (From admission, onward)         Ordered     apixaban tablet 5 mg  2 times daily      12/05/20 1138     IP VTE HIGH RISK PATIENT  Once      12/05/20 0556     Place sequential compression device  Until discontinued      12/05/20 0556                Discharge Planning   ELY:      Code Status: Full Code   Is the patient medically ready for discharge?:     Reason for patient still in hospital (select all that apply): Treatment  Discharge Plan A: Home                  Sea Phelan MD  Department of Hospital Medicine   [facility]

## 2020-12-08 LAB
ANION GAP SERPL CALC-SCNC: 9 MMOL/L (ref 8–16)
BASOPHILS # BLD AUTO: 0.07 K/UL (ref 0–0.2)
BASOPHILS NFR BLD: 0.7 % (ref 0–1.9)
BUN SERPL-MCNC: 27 MG/DL (ref 8–23)
CALCIUM SERPL-MCNC: 9 MG/DL (ref 8.7–10.5)
CHLORIDE SERPL-SCNC: 95 MMOL/L (ref 95–110)
CO2 SERPL-SCNC: 37 MMOL/L (ref 23–29)
CREAT SERPL-MCNC: 1.7 MG/DL (ref 0.5–1.4)
DIFFERENTIAL METHOD: ABNORMAL
EOSINOPHIL # BLD AUTO: 0.1 K/UL (ref 0–0.5)
EOSINOPHIL NFR BLD: 0.9 % (ref 0–8)
ERYTHROCYTE [DISTWIDTH] IN BLOOD BY AUTOMATED COUNT: 13.5 % (ref 11.5–14.5)
EST. GFR  (AFRICAN AMERICAN): 46 ML/MIN/1.73 M^2
EST. GFR  (NON AFRICAN AMERICAN): 40 ML/MIN/1.73 M^2
GLUCOSE SERPL-MCNC: 190 MG/DL (ref 70–110)
HCT VFR BLD AUTO: 27.8 % (ref 40–54)
HGB BLD-MCNC: 9.2 G/DL (ref 14–18)
IMM GRANULOCYTES # BLD AUTO: 0.05 K/UL (ref 0–0.04)
IMM GRANULOCYTES NFR BLD AUTO: 0.5 % (ref 0–0.5)
LYMPHOCYTES # BLD AUTO: 1.3 K/UL (ref 1–4.8)
LYMPHOCYTES NFR BLD: 13.5 % (ref 18–48)
MAGNESIUM SERPL-MCNC: 1.6 MG/DL (ref 1.6–2.6)
MCH RBC QN AUTO: 28 PG (ref 27–31)
MCHC RBC AUTO-ENTMCNC: 33.1 G/DL (ref 32–36)
MCV RBC AUTO: 85 FL (ref 82–98)
MONOCYTES # BLD AUTO: 1 K/UL (ref 0.3–1)
MONOCYTES NFR BLD: 10.1 % (ref 4–15)
NEUTROPHILS # BLD AUTO: 7.2 K/UL (ref 1.8–7.7)
NEUTROPHILS NFR BLD: 74.3 % (ref 38–73)
NRBC BLD-RTO: 0 /100 WBC
PLATELET # BLD AUTO: 358 K/UL (ref 150–350)
PMV BLD AUTO: 9.6 FL (ref 9.2–12.9)
POCT GLUCOSE: 236 MG/DL (ref 70–110)
POCT GLUCOSE: 319 MG/DL (ref 70–110)
POCT GLUCOSE: 337 MG/DL (ref 70–110)
POTASSIUM SERPL-SCNC: 3.3 MMOL/L (ref 3.5–5.1)
RBC # BLD AUTO: 3.29 M/UL (ref 4.6–6.2)
SODIUM SERPL-SCNC: 141 MMOL/L (ref 136–145)
WBC # BLD AUTO: 9.69 K/UL (ref 3.9–12.7)

## 2020-12-08 PROCEDURE — 83735 ASSAY OF MAGNESIUM: CPT

## 2020-12-08 PROCEDURE — 27000221 HC OXYGEN, UP TO 24 HOURS

## 2020-12-08 PROCEDURE — 63600175 PHARM REV CODE 636 W HCPCS: Performed by: STUDENT IN AN ORGANIZED HEALTH CARE EDUCATION/TRAINING PROGRAM

## 2020-12-08 PROCEDURE — 36415 COLL VENOUS BLD VENIPUNCTURE: CPT

## 2020-12-08 PROCEDURE — 99233 PR SUBSEQUENT HOSPITAL CARE,LEVL III: ICD-10-PCS | Mod: ,,, | Performed by: INTERNAL MEDICINE

## 2020-12-08 PROCEDURE — 94640 AIRWAY INHALATION TREATMENT: CPT

## 2020-12-08 PROCEDURE — 80048 BASIC METABOLIC PNL TOTAL CA: CPT

## 2020-12-08 PROCEDURE — 25000242 PHARM REV CODE 250 ALT 637 W/ HCPCS: Performed by: STUDENT IN AN ORGANIZED HEALTH CARE EDUCATION/TRAINING PROGRAM

## 2020-12-08 PROCEDURE — 25000003 PHARM REV CODE 250: Performed by: HOSPITALIST

## 2020-12-08 PROCEDURE — 25000003 PHARM REV CODE 250: Performed by: INTERNAL MEDICINE

## 2020-12-08 PROCEDURE — 85025 COMPLETE CBC W/AUTO DIFF WBC: CPT

## 2020-12-08 PROCEDURE — 21400001 HC TELEMETRY ROOM

## 2020-12-08 PROCEDURE — 99233 SBSQ HOSP IP/OBS HIGH 50: CPT | Mod: ,,, | Performed by: INTERNAL MEDICINE

## 2020-12-08 PROCEDURE — 25000003 PHARM REV CODE 250: Performed by: STUDENT IN AN ORGANIZED HEALTH CARE EDUCATION/TRAINING PROGRAM

## 2020-12-08 RX ORDER — POTASSIUM CHLORIDE 20 MEQ/1
40 TABLET, EXTENDED RELEASE ORAL ONCE
Status: COMPLETED | OUTPATIENT
Start: 2020-12-08 | End: 2020-12-08

## 2020-12-08 RX ORDER — FUROSEMIDE 40 MG/1
40 TABLET ORAL 2 TIMES DAILY
Status: DISCONTINUED | OUTPATIENT
Start: 2020-12-08 | End: 2020-12-10

## 2020-12-08 RX ORDER — METOPROLOL TARTRATE 50 MG/1
50 TABLET ORAL 2 TIMES DAILY
Status: DISCONTINUED | OUTPATIENT
Start: 2020-12-08 | End: 2020-12-10

## 2020-12-08 RX ADMIN — INSULIN ASPART 4 UNITS: 100 INJECTION, SOLUTION INTRAVENOUS; SUBCUTANEOUS at 05:12

## 2020-12-08 RX ADMIN — APIXABAN 5 MG: 5 TABLET, FILM COATED ORAL at 09:12

## 2020-12-08 RX ADMIN — DOXYCYCLINE 100 MG: 100 INJECTION, POWDER, LYOPHILIZED, FOR SOLUTION INTRAVENOUS at 10:12

## 2020-12-08 RX ADMIN — LEVALBUTEROL HYDROCHLORIDE 0.63 MG: 0.63 SOLUTION RESPIRATORY (INHALATION) at 07:12

## 2020-12-08 RX ADMIN — INSULIN ASPART 2 UNITS: 100 INJECTION, SOLUTION INTRAVENOUS; SUBCUTANEOUS at 10:12

## 2020-12-08 RX ADMIN — ATORVASTATIN CALCIUM 80 MG: 40 TABLET, FILM COATED ORAL at 09:12

## 2020-12-08 RX ADMIN — METOPROLOL TARTRATE 50 MG: 50 TABLET, FILM COATED ORAL at 10:12

## 2020-12-08 RX ADMIN — PREDNISONE 20 MG: 20 TABLET ORAL at 10:12

## 2020-12-08 RX ADMIN — METOPROLOL TARTRATE 50 MG: 50 TABLET, FILM COATED ORAL at 09:12

## 2020-12-08 RX ADMIN — POTASSIUM CHLORIDE 40 MEQ: 1500 TABLET, EXTENDED RELEASE ORAL at 10:12

## 2020-12-08 RX ADMIN — Medication 100 MG: at 10:12

## 2020-12-08 RX ADMIN — THERA TABS 1 TABLET: TAB at 10:12

## 2020-12-08 RX ADMIN — INSULIN ASPART 2 UNITS: 100 INJECTION, SOLUTION INTRAVENOUS; SUBCUTANEOUS at 09:12

## 2020-12-08 RX ADMIN — FUROSEMIDE 40 MG: 40 TABLET ORAL at 10:12

## 2020-12-08 RX ADMIN — LEVALBUTEROL HYDROCHLORIDE 0.63 MG: 0.63 SOLUTION RESPIRATORY (INHALATION) at 03:12

## 2020-12-08 RX ADMIN — DOXYCYCLINE 100 MG: 100 INJECTION, POWDER, LYOPHILIZED, FOR SOLUTION INTRAVENOUS at 09:12

## 2020-12-08 RX ADMIN — APIXABAN 5 MG: 5 TABLET, FILM COATED ORAL at 10:12

## 2020-12-08 RX ADMIN — MAGNESIUM OXIDE TAB 400 MG (241.3 MG ELEMENTAL MG) 400 MG: 400 (241.3 MG) TAB at 09:12

## 2020-12-08 RX ADMIN — MAGNESIUM OXIDE TAB 400 MG (241.3 MG ELEMENTAL MG) 400 MG: 400 (241.3 MG) TAB at 10:12

## 2020-12-08 RX ADMIN — FLUTICASONE FUROATE AND VILANTEROL TRIFENATATE 1 PUFF: 200; 25 POWDER RESPIRATORY (INHALATION) at 07:12

## 2020-12-08 RX ADMIN — FUROSEMIDE 40 MG: 40 TABLET ORAL at 05:12

## 2020-12-08 RX ADMIN — FENOFIBRATE 160 MG: 160 TABLET ORAL at 10:12

## 2020-12-08 NOTE — ASSESSMENT & PLAN NOTE
EKG reviewed and noted wide complex tachycardia, not consistent VT.  Reviewed older EKGs and there are changes.    On admission, patient appears to be in atrial flutter/fibrillation with an irregular rhythm on telemetry but rate is 80s. Will continue to monitor, patient states he's been compliant with his home medications.   Cardiology consulted   Patient started on Eliquis 5 mg p.o. b.i.d. and cardioversion 12/7  Started on metoprolol 25 b.i.d. for rate control

## 2020-12-08 NOTE — ASSESSMENT & PLAN NOTE
Newly noted this admission  HR controlled  CHADSVASC 3  Cont eliquis 5mg bid  S/p MARIA/DCCV 12/7/20  Inc metoprolol 50mg bid

## 2020-12-08 NOTE — ASSESSMENT & PLAN NOTE
Patient has a history of low normal EF and diastolic dysfunction.  Last echocardiogram 1 week ago showed improved EF and no diastolic heart dysfunction.  Concern for some mild volume overload at this time.  Continue lasix diuresis.  Patient reports compliance with his home medication

## 2020-12-08 NOTE — PROGRESS NOTES
[facility] Hospital Medicine  Progress Note    Patient Name: Chato Bowie  MRN: [id number]  Patient Class: IP- Inpatient   Admission Date: 12/4/2020  Length of Stay: 3 days  Attending Physician: Wen Polk MD  Primary Care Provider: [facility]        Subjective:     Principal Problem:Acute hypoxemic respiratory failure        HPI:  Mr. Marie is a 70-year-old male with a past medical history of coronary artery disease, hypertension, diabetes, BPH, alcohol use who presents after he has been having a cough for 1 week.  The patient was recently admitted for chest pain and underwent nuclear stress test.  She had a fixed defect the patient was discharged with follow-up.  The patient states that ever since he was discharged, he has been having a dry cough and it has gotten so bad that it makes his chest hurt.  He is not producing any sputum.  Otherwise, he does not have chest pain on exertion.  Only when he coughs.  He denies fevers or chills but endorses shortness of breath with exertion in anything that he does.  He is not able to lie flat.  His lower extremities do have some swelling he states that this is his baseline and not worse than usual.  He denies smoking and denies any history of lung disease.  However he does believe that he may have some sort of asthma. The patient states he has not had any alcohol since he was discharged last week.    Overview/Hospital Course:  Admitted for chest pain, cough and hypoxemic respiratory failure. Found to be afib/flutter and concerned for pneumonia and volume overload. Started on antibiotics and IV diuresis. Cardiology consulted and MARIA/cardioversion on 12/7.     Interval History: Feeling better. Still has dry cough and shortness of breath with exertion. Weaned to 3L NC     Review of Systems   Constitutional: Negative for chills and fever.   Respiratory: Positive for cough and shortness of breath.    Cardiovascular: Negative for chest pain and  leg swelling.   Gastrointestinal: Negative for abdominal pain, nausea and vomiting.   Genitourinary: Negative for difficulty urinating.     Objective:     Vital Signs (Most Recent):  Temp: 98.8 °F (37.1 °C) (12/08/20 1541)  Pulse: 79 (12/08/20 1541)  Resp: 18 (12/08/20 1541)  BP: (!) 146/78 (12/08/20 1541)  SpO2: (!) 94 % (12/08/20 1541) Vital Signs (24h Range):  Temp:  [97.4 °F (36.3 °C)-98.8 °F (37.1 °C)] 98.8 °F (37.1 °C)  Pulse:  [75-84] 79  Resp:  [17-20] 18  SpO2:  [94 %-98 %] 94 %  BP: (127-161)/(78-86) 146/78     Weight: 101.8 kg (224 lb 6.9 oz)  Body mass index is 31.3 kg/m².    Intake/Output Summary (Last 24 hours) at 12/8/2020 1630  Last data filed at 12/8/2020 1400  Gross per 24 hour   Intake 720 ml   Output 620 ml   Net 100 ml      Physical Exam  Vitals signs and nursing note reviewed.   Constitutional:       General: He is not in acute distress.     Appearance: He is not toxic-appearing.   HENT:      Head: Normocephalic and atraumatic.      Nose: Nose normal.      Mouth/Throat:      Mouth: Mucous membranes are moist.   Cardiovascular:      Rate and Rhythm: Normal rate and regular rhythm.      Pulses: Normal pulses.      Heart sounds: Normal heart sounds. No murmur. No gallop.    Pulmonary:      Effort: Pulmonary effort is normal. No respiratory distress.      Breath sounds: Normal breath sounds. No wheezing or rales.      Comments: 3L O2 by NC  Abdominal:      General: Bowel sounds are normal. There is no distension.      Tenderness: There is no abdominal tenderness. There is no guarding.   Musculoskeletal:      Right lower leg: No edema.      Left lower leg: No edema.   Skin:     General: Skin is warm and dry.   Neurological:      Mental Status: He is alert.         Significant Labs: All pertinent labs within the past 24 hours have been reviewed.    Significant Imaging: I have reviewed and interpreted all pertinent imaging results/findings within the past 24 hours.      Assessment/Plan:      * Acute  hypoxemic respiratory failure  Patient presented with dry cough and persistent shortness of breath.  Became hypoxic and went for CTA which showed no evidence of a pulmonary embolism but noted patchy multifocal opacities within the right lower lobe as well as small bilateral pleural effusions with mild dependent consolidations with compressive atelectasis.  Patient does not appear in distress but was a non-rebreather mask.  Lung exam were consistent with volume overload and some expiratory wheezing  There is concern for some sort of infectious process.  Start patient on doxycycline.  Procalcitonin only 0.11.    COVID negative.   Started prednisone.   Slowly improving and weaning O2 as tolerated    Atrial flutter  Paroxysmal  Metoprolol HR control, eliquis anticoagulation      Other chest pain  Patient describes his chest pain is only when he coughs which has been going on for 1 week.  He also describes the pain in his abdomen.  Denies chest pain on exertion, EKG reviewed troponin downtrending.  Do not suspect ACS at this time.  Continue with aspirin and statin and symptom relief  Resolved      Pneumonia of right lower lobe due to infectious organism  Patient gives a history a dry cough with shortness of breath and is now hypoxic.  He does have evidence of volume overload however on CT  it showed that he had multifocal patchy opacities in right lower lobe.  More likely to be atypical in nature given his symptoms.  COVID negative.  Started doxycycline.  Continue for total of 5 days considering exam findings and clinical presentation.  Wean off oxygen as tolerated      Aortic stenosis, moderate  Noted on previous echo      CKD (chronic kidney disease), stage III  Appears to be at baseline.  Will continue to monitor      Acute on chronic diastolic congestive heart failure  Patient has a history of low normal EF and diastolic dysfunction.  Last echocardiogram 1 week ago showed improved EF and no diastolic heart  dysfunction.  Concern for some mild volume overload at this time.  Continue lasix diuresis.  Patient reports compliance with his home medication      Alcohol abuse  Patient has a history of alcohol abuse however denies any recent used in the past week.  Not showing any signs or symptoms of withdrawal at this time.  Continue with thiamine and multivitamin      Hypokalemia  Will replace and monitor      GERD (gastroesophageal reflux disease)  Not on any medications at home.  Will monitor for now.      Anemia of chronic disease  Hemoglobin appears slightly lower than on previous admissions.  No evidence of bleeding at this time.      Type 2 diabetes mellitus, uncontrolled  A1c:   Lab Results   Component Value Date    HGBA1C 11.2 (H) 11/24/2020     Meds: basal insulin + SSI PRN to maintain goal 140-180  ADA diet, accuchecks ACHS, hypoglycemic protocol          Hyperlipidemia  Continue with home statin      Essential hypertension  Relatively normotensive at this time.  Started on metoprolol 25 mg p.o. b.i.d. for Afib rate control.  Will continue to monitor.      CAD (coronary artery disease)  History of coronary artery disease.  Continue with aspirin statin      Paroxysmal atrial fibrillation  EKG reviewed and noted wide complex tachycardia, not consistent VT.  Reviewed older EKGs and there are changes.    On admission, patient appears to be in atrial flutter/fibrillation with an irregular rhythm on telemetry but rate is 80s. Will continue to monitor, patient states he's been compliant with his home medications.   Cardiology consulted   Patient started on Eliquis 5 mg p.o. b.i.d. and cardioversion 12/7  Started on metoprolol 25 b.i.d. for rate control        VTE Risk Mitigation (From admission, onward)         Ordered     apixaban tablet 5 mg  2 times daily      12/05/20 1138     IP VTE HIGH RISK PATIENT  Once      12/05/20 0556     Place sequential compression device  Until discontinued      12/05/20 0556                 Discharge Planning   ELY:      Code Status: Full Code   Is the patient medically ready for discharge?:     Reason for patient still in hospital (select all that apply): Patient trending condition  Discharge Plan A: Home Health   Discharge Delays: None known at this time              Wen Polk MD  Department of Hospital Medicine   [facility]

## 2020-12-08 NOTE — NURSING
Received report from night nurse JOYCE Smith. Patient comfortable in bed at time of report with no signs of acute distress noted. Safety precautions maintained, will continue to monitor.

## 2020-12-08 NOTE — SUBJECTIVE & OBJECTIVE
Interval History: Feeling better. Still has dry cough and shortness of breath with exertion. Weaned to 3L NC     Review of Systems   Constitutional: Negative for chills and fever.   Respiratory: Positive for cough and shortness of breath.    Cardiovascular: Negative for chest pain and leg swelling.   Gastrointestinal: Negative for abdominal pain, nausea and vomiting.   Genitourinary: Negative for difficulty urinating.     Objective:     Vital Signs (Most Recent):  Temp: 98.8 °F (37.1 °C) (12/08/20 1541)  Pulse: 79 (12/08/20 1541)  Resp: 18 (12/08/20 1541)  BP: (!) 146/78 (12/08/20 1541)  SpO2: (!) 94 % (12/08/20 1541) Vital Signs (24h Range):  Temp:  [97.4 °F (36.3 °C)-98.8 °F (37.1 °C)] 98.8 °F (37.1 °C)  Pulse:  [75-84] 79  Resp:  [17-20] 18  SpO2:  [94 %-98 %] 94 %  BP: (127-161)/(78-86) 146/78     Weight: 101.8 kg (224 lb 6.9 oz)  Body mass index is 31.3 kg/m².    Intake/Output Summary (Last 24 hours) at 12/8/2020 1630  Last data filed at 12/8/2020 1400  Gross per 24 hour   Intake 720 ml   Output 620 ml   Net 100 ml      Physical Exam  Vitals signs and nursing note reviewed.   Constitutional:       General: He is not in acute distress.     Appearance: He is not toxic-appearing.   HENT:      Head: Normocephalic and atraumatic.      Nose: Nose normal.      Mouth/Throat:      Mouth: Mucous membranes are moist.   Cardiovascular:      Rate and Rhythm: Normal rate and regular rhythm.      Pulses: Normal pulses.      Heart sounds: Normal heart sounds. No murmur. No gallop.    Pulmonary:      Effort: Pulmonary effort is normal. No respiratory distress.      Breath sounds: Normal breath sounds. No wheezing or rales.      Comments: 3L O2 by NC  Abdominal:      General: Bowel sounds are normal. There is no distension.      Tenderness: There is no abdominal tenderness. There is no guarding.   Musculoskeletal:      Right lower leg: No edema.      Left lower leg: No edema.   Skin:     General: Skin is warm and dry.    Neurological:      Mental Status: He is alert.         Significant Labs: All pertinent labs within the past 24 hours have been reviewed.    Significant Imaging: I have reviewed and interpreted all pertinent imaging results/findings within the past 24 hours.

## 2020-12-08 NOTE — ASSESSMENT & PLAN NOTE
Patient gives a history a dry cough with shortness of breath and is now hypoxic.  He does have evidence of volume overload however on CT  it showed that he had multifocal patchy opacities in right lower lobe.  More likely to be atypical in nature given his symptoms.  COVID negative.  Started doxycycline.  Continue for total of 5 days considering exam findings and clinical presentation.  Wean off oxygen as tolerated

## 2020-12-08 NOTE — ASSESSMENT & PLAN NOTE
Relatively normotensive at this time.  Started on metoprolol 25 mg p.o. b.i.d. for Afib rate control.  Will continue to monitor.

## 2020-12-08 NOTE — PROGRESS NOTES
[facility]  Cardiology  Progress Note    Patient Name: Chato Bowie  MRN: [id number]  Admission Date: 12/4/2020  Hospital Length of Stay: 3 days  Code Status: Full Code   Attending Physician: Wen Polk MD   Primary Care Physician: Irlanda Valenzuela  Expected Discharge Date:   Principal Problem:Acute hypoxemic respiratory failure    Subjective:     Hospital Course:   12/4 adm with resp failure and AFL  12/7 successful MARIA/DCCV    Interval Hx: SOB improving, no cp/palps/LH.    Tele: SR 70s (personally reviewed and interpreted)          Past Medical History:   Diagnosis Date    Acute congestive heart failure 3/20/2020    Alcohol abuse     Arthritis     Coronary artery disease     Diabetes mellitus     Hyperlipemia     Hypertension     Rhabdomyolysis        Past Surgical History:   Procedure Laterality Date    EYE SURGERY      VASCULAR SURGERY         Review of patient's allergies indicates:  No Known Allergies    No current facility-administered medications on file prior to encounter.      Current Outpatient Medications on File Prior to Encounter   Medication Sig    amlodipine (NORVASC) 10 MG tablet Take 10 mg by mouth once daily.    aspirin (ECOTRIN) 81 MG EC tablet Take 1 tablet (81 mg total) by mouth once daily.    aspirin-calcium carbonate 81 mg-300 mg calcium(777 mg) Tab Take 81 mg by mouth.    atorvastatin (LIPITOR) 80 MG tablet Take 80 mg by mouth every evening.    atropine 1% (ISOPTO ATROPINE) 1 % Drop INT 1 GTT INTO OD QD FOR 1 WK    carvedilol (COREG) 12.5 MG tablet Take 1 tablet (12.5 mg total) by mouth 2 (two) times daily. (Patient taking differently: Take 25 mg by mouth 2 (two) times daily. )    colchicine (COLCRYS) 0.6 mg tablet Take 1 tablet (0.6 mg total) by mouth once daily. for 14 days    DUREZOL 0.05 % Drop ophthalmic solution INT 1 GTT INTO OD FID FOR 3 WEEKS    fenofibrate 160 MG Tab Take 160 mg by mouth.    folic acid (FOLVITE) 1 MG tablet Take 1 mg  by mouth.    furosemide (LASIX) 40 MG tablet Take 1 tablet (40 mg total) by mouth once daily.    hydrALAZINE (APRESOLINE) 50 MG tablet Take 1 tablet (50 mg total) by mouth 3 (three) times daily.    insulin syringe-needle U-100 1 mL 31 gauge x 5/16 Syrg USE TO INJECT INSULIN TWICE A DAY    lansoprazole (PREVACID) 30 MG capsule Take 30 mg by mouth once daily.    magnesium oxide (MAG-OX) 400 mg (241.3 mg magnesium) tablet Take 800 mg by mouth.    multivitamin Tab Take 1 tablet by mouth once daily.    NOVOLOG MIX 70-30 U-100 INSULN 100 unit/mL (70-30) Soln Inject 10 Units into the skin before meals as needed.    pantoprazole (PROTONIX) 40 MG tablet Take 1 tablet (40 mg total) by mouth 2 (two) times daily.    rosuvastatin (CRESTOR) 40 MG Tab Take 10 mg by mouth once daily.    sildenafil (VIAGRA) 100 MG tablet Take 1 tablet (100 mg total) by mouth daily as needed for Erectile Dysfunction. *generic tabs*    tamsulosin (FLOMAX) 0.4 mg Cap Take 1 capsule (0.4 mg total) by mouth once daily.    terbinafine HCl (LAMISIL) 250 mg tablet     thiamine 100 MG tablet Take 1 tablet (100 mg total) by mouth once daily.     Family History     Problem Relation (Age of Onset)    Diabetes Mother, Father, Sister    Hypertension Mother, Father, Sister        Tobacco Use    Smoking status: Never Smoker    Smokeless tobacco: Never Used   Substance and Sexual Activity    Alcohol use: Yes     Alcohol/week: 4.0 standard drinks     Types: 4 Cans of beer per week    Drug use: No    Sexual activity: Not on file     Review of Systems   Gastrointestinal: Negative for melena.   Genitourinary: Negative for hematuria.     Objective:     Vital Signs (Most Recent):  Temp: 98.5 °F (36.9 °C) (12/08/20 0742)  Pulse: 75 (12/08/20 0759)  Resp: 19 (12/08/20 0759)  BP: (!) 156/86 (12/08/20 0742)  SpO2: 98 % (12/08/20 0749) Vital Signs (24h Range):  Temp:  [97 °F (36.1 °C)-98.5 °F (36.9 °C)] 98.5 °F (36.9 °C)  Pulse:  [] 75  Resp:  [11-19]  19  SpO2:  [93 %-98 %] 98 %  BP: (124-156)/(71-97) 156/86     Weight: 99.9 kg (220 lb 3.8 oz)  Body mass index is 30.72 kg/m².    SpO2: 98 %  O2 Device (Oxygen Therapy): nasal cannula w/ humidification      Intake/Output Summary (Last 24 hours) at 12/8/2020 0853  Last data filed at 12/8/2020 0356  Gross per 24 hour   Intake 240 ml   Output 1670 ml   Net -1430 ml       Lines/Drains/Airways     Airway                 Airway - Non-Surgical 12/07/20 1427 Nasal Cannula less than 1 day          Peripheral Intravenous Line                 Peripheral IV - Single Lumen 12/04/20 2246 18 G Left Antecubital 3 days                Physical Exam   Constitutional: He is oriented to person, place, and time. He appears well-developed and well-nourished. No distress.   HENT:   Head: Normocephalic and atraumatic.   Eyes: Pupils are equal, round, and reactive to light. Conjunctivae and EOM are normal. No scleral icterus.   Neck: Normal range of motion. Neck supple. No JVD present. No tracheal deviation present. No thyromegaly present.   Cardiovascular: Normal rate, regular rhythm, S1 normal and S2 normal. Exam reveals no gallop and no friction rub.   Murmur heard.   Systolic murmur is present with a grade of 1/6.  Pulmonary/Chest: Effort normal and breath sounds normal. No respiratory distress. He has no wheezes. He has no rales. He exhibits no tenderness.   Abdominal: Soft. He exhibits no distension.   Musculoskeletal:         General: No edema.   Neurological: He is alert and oriented to person, place, and time. He has normal strength. No cranial nerve deficit.   Skin: Skin is warm and dry. No rash noted. He is not diaphoretic.   Psychiatric: He has a normal mood and affect. His behavior is normal.       Current Medications:   apixaban  5 mg Oral BID    atorvastatin  80 mg Oral QHS    doxycycline (VIBRAMYCIN) IVPB  100 mg Intravenous Q12H    fenofibrate  160 mg Oral Daily    fluticasone furoate-vilanteroL  1 puff Inhalation Daily     furosemide (LASIX) IV  40 mg Intravenous BID    insulin detemir U-100  10 Units Subcutaneous Daily    levalbuterol  0.63 mg Nebulization Q8H    magnesium oxide  400 mg Oral BID    metoprolol tartrate  25 mg Oral BID    multivitamin  1 tablet Oral Daily    potassium chloride  40 mEq Oral Once    predniSONE  20 mg Oral Daily    thiamine  100 mg Oral Daily       benzonatate, dextrose 50%, dextrose 50%, glucagon (human recombinant), glucose, glucose, guaifenesin 100 mg/5 ml, insulin aspart U-100, sodium chloride 0.9%    Laboratory (all labs reviewed):  CBC:  Recent Labs   Lab 12/04/20 2323 12/05/20  0645 12/06/20  0505 12/07/20  0430 12/08/20  0354   WBC 7.58 7.21 9.45 9.74 9.69   Hemoglobin 9.2 L 9.9 L 9.0 L 9.1 L 9.2 L   Hematocrit 27.2 L 29.6 L 27.6 L 27.1 L 27.8 L   Platelets 243 278 288 332 358 H       CHEMISTRIES:  Recent Labs   Lab 11/27/20  0540 12/04/20 2323 12/05/20 0645 12/06/20  0505 12/07/20  0429 12/08/20  0354   Glucose 241 H 224 H 195 H 184 H 299 H 190 H   Sodium 138 139 138 139 138 141   Potassium 3.1 L 2.6 LL 3.4 L 3.2 L 3.6 3.3 L   BUN 16 19 19 20 24 H 27 H   Creatinine 1.6 H 1.7 H 1.7 H 1.8 H 1.8 H 1.7 H   eGFR if  50 A 46 A 46 A 43 A 43 A 46 A   eGFR if non  43 A 40 A 40 A 37 A 37 A 40 A   Calcium 8.1 L 8.1 L 8.4 L 8.5 L 8.8 9.0   Magnesium 1.8 1.4 L  --  1.6 1.5 L 1.6       CARDIAC BIOMARKERS:  Recent Labs   Lab 11/24/20 2001 11/24/20 2157 11/25/20 0655 12/04/20 2323 12/05/20  0645 12/05/20  1545    H  --   --   --   --   --    Troponin I 0.060 H 0.067 H 0.070 H 0.022 0.013 0.023       COAGS:  Recent Labs   Lab 03/20/20  0014 11/24/20  1519   INR 1.0 1.0       LIPIDS/LFTS:  Recent Labs   Lab 03/20/20 0014 11/24/20  1519 11/25/20  0433 11/26/20  0638 12/04/20  2323   Cholesterol  --  98 L  --   --   --    Triglycerides  --  234 H  --   --   --    HDL  --  36 L  --   --   --    LDL Cholesterol  --  15.2 L  --   --   --    Non-HDL Cholesterol   --  62  --   --   --    AST 26 47 H 47 H 43 H 29   ALT 24 28 27 23 26       BNP:  Recent Labs   Lab 03/20/20  0014 11/24/20  1519 12/04/20  2323    H 97 868 H       TSH:  Recent Labs   Lab 11/24/20  1519   TSH 1.539       Free T4:        Diagnostic Results:  ECG (personally reviewed and interpreted tracing(s)):  11/24/20 1516 SR 82, 1st deg AVB, IVCD  12/5/20 0030 SR vs ?2:1 , IVCD  12/5/20 1113 tele AF 90s    Chest X-Ray (personally reviewed and interpreted image(s)): 12/5/20 ?CHF vs PNA    MARIA/DCCV 12/7/20 (images personally reviewed and interpreted)  LVEF 60%  Normal RV fxn  Biatrial enlargement  No LA/VANESA thrombus  Mod MR  Mild-Mod AI  Mod AS, BASIL 1.5 cm2 (by planimetry)  Successful DCCV  BPM->SR 70 BPM 75J sync biphasic x1  Plan:  Cont eliquis 5mg bid  Dispo planning appropriate, pt to follow up with Dr. Llamas as planned.    Echo: 11/25/20  · The left ventricle is normal in size with normal systolic function. The estimated ejection fraction is 55%.  · There is left ventricular concentric hypertrophy.  · Moderate left atrial enlargement.  · Normal left ventricular diastolic function.  · Normal right ventricular size with normal right ventricular systolic function.  · Mild-to-moderate aortic valve stenosis.  · Aortic valve area is 1.61 cm2; peak velocity is 3.23 m/s; mean gradient is 25 mmHg.  · Moderate aortic regurgitation.  · Mild mitral regurgitation.  · No pulmonary hypertension    Stress Test: L MPI 11/25/20    Normal myocardial perfusion scan. There is no evidence of myocardial ischemia or infarction.    There is a  moderate intensity fixed defect in the inferior wall of the left ventricle secondary to diaphragm attenuation.    Gated perfusion images showed an ejection fraction of 48% post stress.    There is normal wall motion post stress.    The EKG portion of this study is abnormal but not diagnostic.    There were no arrhythmias during stress.          Assessment and Plan:      * Acute hypoxemic respiratory failure  Mgmt per IM  Change lasix to PO    Paroxysmal atrial fibrillation  Newly noted this admission  HR controlled  CHADSVASC 3  Cont eliquis 5mg bid  S/p MARIA/DCCV 12/7/20  Inc metoprolol 50mg bid      Essential hypertension  Cont med rx    Hyperlipidemia  Cont statin    Type 2 diabetes mellitus, uncontrolled  Per IM    Acute on chronic diastolic congestive heart failure  Recent cardiac workup (echo/MPI 11/2020) normal.  Mild to mod AS noted, EF normal.  No need for repeat isch eval    CKD (chronic kidney disease), stage III  Creat stable        VTE Risk Mitigation (From admission, onward)         Ordered     apixaban tablet 5 mg  2 times daily      12/05/20 1138     IP VTE HIGH RISK PATIENT  Once      12/05/20 0556     Place sequential compression device  Until discontinued      12/05/20 0556              Dispo planning appropriate.  Cardiology will sign off, pls call with questions.  Pt to follow up with Dr. Llamas as planned 12/10/20.    Indra Foote MD  Cardiology  [facility]

## 2020-12-08 NOTE — NURSING
Gave report to night nurse JOYCE Smith. Patient comfortable in bed at time of report with no signs of acute distress noted. Safety precautions maintained.

## 2020-12-08 NOTE — PLAN OF CARE
12/08/20 1136   Discharge Reassessment   Assessment Type Discharge Planning Reassessment   Do you have any problems affording any of your prescribed medications? No   Discharge Plan A Home Health   Discharge Plan B Home   DME Needed Upon Discharge  none   Patient choice form signed by patient/caregiver N/A   Anticipated Discharge Disposition Home-Health   Can the patient/caregiver answer the patient profile reliably? Yes, cognitively intact   How does the patient rate their overall health at the present time? Good   Describe the patient's ability to walk at the present time. No restrictions   How often would a person be available to care for the patient? Often   Number of comorbid conditions (as recorded on the chart) Three   During the past month, has the patient often been bothered by feeling down, depressed or hopeless? No   During the past month, has the patient often been bothered by little interest or pleasure in doing things? No   Post-Acute Status   Post-Acute Authorization Other   Other Status No Post-Acute Service Needs   Discharge Delays None known at this time

## 2020-12-08 NOTE — ASSESSMENT & PLAN NOTE
Hemoglobin appears slightly lower than on previous admissions.  No evidence of bleeding at this time.

## 2020-12-08 NOTE — PLAN OF CARE
Problem: Diabetes Comorbidity  Goal: Blood Glucose Level Within Desired Range  Outcome: Ongoing, Progressing     Problem: Renal Function Impairment (Acute Kidney Injury/Impairment)  Goal: Effective Renal Function  Outcome: Ongoing, Progressing  Intervention: Monitor and Support Renal Function  Flowsheets (Taken 12/8/2020 0526)  Medication Review/Management: medications reviewed     Problem: Respiratory Compromise (Pneumonia)  Goal: Effective Oxygenation and Ventilation  Outcome: Ongoing, Progressing  Intervention: Promote Airway Secretion Clearance  Flowsheets (Taken 12/8/2020 0526)  Breathing Techniques/Airway Clearance: deep/controlled cough encouraged  Cough And Deep Breathing: done independently per patient       Pt remained stable throughout shift. No c/o of pain or other issues.  Will continue to monitor.

## 2020-12-08 NOTE — SUBJECTIVE & OBJECTIVE
Past Medical History:   Diagnosis Date    Acute congestive heart failure 3/20/2020    Alcohol abuse     Arthritis     Coronary artery disease     Diabetes mellitus     Hyperlipemia     Hypertension     Rhabdomyolysis        Past Surgical History:   Procedure Laterality Date    EYE SURGERY      VASCULAR SURGERY         Review of patient's allergies indicates:  No Known Allergies    No current facility-administered medications on file prior to encounter.      Current Outpatient Medications on File Prior to Encounter   Medication Sig    amlodipine (NORVASC) 10 MG tablet Take 10 mg by mouth once daily.    aspirin (ECOTRIN) 81 MG EC tablet Take 1 tablet (81 mg total) by mouth once daily.    aspirin-calcium carbonate 81 mg-300 mg calcium(777 mg) Tab Take 81 mg by mouth.    atorvastatin (LIPITOR) 80 MG tablet Take 80 mg by mouth every evening.    atropine 1% (ISOPTO ATROPINE) 1 % Drop INT 1 GTT INTO OD QD FOR 1 WK    carvedilol (COREG) 12.5 MG tablet Take 1 tablet (12.5 mg total) by mouth 2 (two) times daily. (Patient taking differently: Take 25 mg by mouth 2 (two) times daily. )    colchicine (COLCRYS) 0.6 mg tablet Take 1 tablet (0.6 mg total) by mouth once daily. for 14 days    DUREZOL 0.05 % Drop ophthalmic solution INT 1 GTT INTO OD FID FOR 3 WEEKS    fenofibrate 160 MG Tab Take 160 mg by mouth.    folic acid (FOLVITE) 1 MG tablet Take 1 mg by mouth.    furosemide (LASIX) 40 MG tablet Take 1 tablet (40 mg total) by mouth once daily.    hydrALAZINE (APRESOLINE) 50 MG tablet Take 1 tablet (50 mg total) by mouth 3 (three) times daily.    insulin syringe-needle U-100 1 mL 31 gauge x 5/16 Syrg USE TO INJECT INSULIN TWICE A DAY    lansoprazole (PREVACID) 30 MG capsule Take 30 mg by mouth once daily.    magnesium oxide (MAG-OX) 400 mg (241.3 mg magnesium) tablet Take 800 mg by mouth.    multivitamin Tab Take 1 tablet by mouth once daily.    NOVOLOG MIX 70-30 U-100 INSULN 100 unit/mL (70-30) Soln  Inject 10 Units into the skin before meals as needed.    pantoprazole (PROTONIX) 40 MG tablet Take 1 tablet (40 mg total) by mouth 2 (two) times daily.    rosuvastatin (CRESTOR) 40 MG Tab Take 10 mg by mouth once daily.    sildenafil (VIAGRA) 100 MG tablet Take 1 tablet (100 mg total) by mouth daily as needed for Erectile Dysfunction. *generic tabs*    tamsulosin (FLOMAX) 0.4 mg Cap Take 1 capsule (0.4 mg total) by mouth once daily.    terbinafine HCl (LAMISIL) 250 mg tablet     thiamine 100 MG tablet Take 1 tablet (100 mg total) by mouth once daily.     Family History     Problem Relation (Age of Onset)    Diabetes Mother, Father, Sister    Hypertension Mother, Father, Sister        Tobacco Use    Smoking status: Never Smoker    Smokeless tobacco: Never Used   Substance and Sexual Activity    Alcohol use: Yes     Alcohol/week: 4.0 standard drinks     Types: 4 Cans of beer per week    Drug use: No    Sexual activity: Not on file     Review of Systems   Gastrointestinal: Negative for melena.   Genitourinary: Negative for hematuria.     Objective:     Vital Signs (Most Recent):  Temp: 98.5 °F (36.9 °C) (12/08/20 0742)  Pulse: 75 (12/08/20 0759)  Resp: 19 (12/08/20 0759)  BP: (!) 156/86 (12/08/20 0742)  SpO2: 98 % (12/08/20 0749) Vital Signs (24h Range):  Temp:  [97 °F (36.1 °C)-98.5 °F (36.9 °C)] 98.5 °F (36.9 °C)  Pulse:  [] 75  Resp:  [11-19] 19  SpO2:  [93 %-98 %] 98 %  BP: (124-156)/(71-97) 156/86     Weight: 99.9 kg (220 lb 3.8 oz)  Body mass index is 30.72 kg/m².    SpO2: 98 %  O2 Device (Oxygen Therapy): nasal cannula w/ humidification      Intake/Output Summary (Last 24 hours) at 12/8/2020 0833  Last data filed at 12/8/2020 0356  Gross per 24 hour   Intake 240 ml   Output 1670 ml   Net -1430 ml       Lines/Drains/Airways     Airway                 Airway - Non-Surgical 12/07/20 1427 Nasal Cannula less than 1 day          Peripheral Intravenous Line                 Peripheral IV - Single Lumen  12/04/20 2246 18 G Left Antecubital 3 days                Physical Exam   Constitutional: He is oriented to person, place, and time. He appears well-developed and well-nourished. No distress.   HENT:   Head: Normocephalic and atraumatic.   Eyes: Pupils are equal, round, and reactive to light. Conjunctivae and EOM are normal. No scleral icterus.   Neck: Normal range of motion. Neck supple. No JVD present. No tracheal deviation present. No thyromegaly present.   Cardiovascular: Normal rate, regular rhythm, S1 normal and S2 normal. Exam reveals no gallop and no friction rub.   Murmur heard.   Systolic murmur is present with a grade of 1/6.  Pulmonary/Chest: Effort normal and breath sounds normal. No respiratory distress. He has no wheezes. He has no rales. He exhibits no tenderness.   Abdominal: Soft. He exhibits no distension.   Musculoskeletal:         General: No edema.   Neurological: He is alert and oriented to person, place, and time. He has normal strength. No cranial nerve deficit.   Skin: Skin is warm and dry. No rash noted. He is not diaphoretic.   Psychiatric: He has a normal mood and affect. His behavior is normal.       Current Medications:   apixaban  5 mg Oral BID    atorvastatin  80 mg Oral QHS    doxycycline (VIBRAMYCIN) IVPB  100 mg Intravenous Q12H    fenofibrate  160 mg Oral Daily    fluticasone furoate-vilanteroL  1 puff Inhalation Daily    furosemide (LASIX) IV  40 mg Intravenous BID    insulin detemir U-100  10 Units Subcutaneous Daily    levalbuterol  0.63 mg Nebulization Q8H    magnesium oxide  400 mg Oral BID    metoprolol tartrate  25 mg Oral BID    multivitamin  1 tablet Oral Daily    potassium chloride  40 mEq Oral Once    predniSONE  20 mg Oral Daily    thiamine  100 mg Oral Daily       benzonatate, dextrose 50%, dextrose 50%, glucagon (human recombinant), glucose, glucose, guaifenesin 100 mg/5 ml, insulin aspart U-100, sodium chloride 0.9%    Laboratory (all labs  reviewed):  CBC:  Recent Labs   Lab 12/04/20  2323 12/05/20  0645 12/06/20  0505 12/07/20  0430 12/08/20  0354   WBC 7.58 7.21 9.45 9.74 9.69   Hemoglobin 9.2 L 9.9 L 9.0 L 9.1 L 9.2 L   Hematocrit 27.2 L 29.6 L 27.6 L 27.1 L 27.8 L   Platelets 243 278 288 332 358 H       CHEMISTRIES:  Recent Labs   Lab 11/27/20  0540 12/04/20  2323 12/05/20  0645 12/06/20  0505 12/07/20  0429 12/08/20  0354   Glucose 241 H 224 H 195 H 184 H 299 H 190 H   Sodium 138 139 138 139 138 141   Potassium 3.1 L 2.6 LL 3.4 L 3.2 L 3.6 3.3 L   BUN 16 19 19 20 24 H 27 H   Creatinine 1.6 H 1.7 H 1.7 H 1.8 H 1.8 H 1.7 H   eGFR if  50 A 46 A 46 A 43 A 43 A 46 A   eGFR if non  43 A 40 A 40 A 37 A 37 A 40 A   Calcium 8.1 L 8.1 L 8.4 L 8.5 L 8.8 9.0   Magnesium 1.8 1.4 L  --  1.6 1.5 L 1.6       CARDIAC BIOMARKERS:  Recent Labs   Lab 11/24/20 2001 11/24/20 2157 11/25/20 0655 12/04/20 2323 12/05/20 0645 12/05/20  1545    H  --   --   --   --   --    Troponin I 0.060 H 0.067 H 0.070 H 0.022 0.013 0.023       COAGS:  Recent Labs   Lab 03/20/20 0014 11/24/20  1519   INR 1.0 1.0       LIPIDS/LFTS:  Recent Labs   Lab 03/20/20 0014 11/24/20  1519 11/25/20  0433 11/26/20  0638 12/04/20  2323   Cholesterol  --  98 L  --   --   --    Triglycerides  --  234 H  --   --   --    HDL  --  36 L  --   --   --    LDL Cholesterol  --  15.2 L  --   --   --    Non-HDL Cholesterol  --  62  --   --   --    AST 26 47 H 47 H 43 H 29   ALT 24 28 27 23 26       BNP:  Recent Labs   Lab 03/20/20  0014 11/24/20  1519 12/04/20  2323    H 97 868 H       TSH:  Recent Labs   Lab 11/24/20  1519   TSH 1.539       Free T4:        Diagnostic Results:  ECG (personally reviewed and interpreted tracing(s)):  11/24/20 1516 SR 82, 1st deg AVB, IVCD  12/5/20 0030 SR vs ?2:1 , IVCD  12/5/20 1113 tele AF 90s    Chest X-Ray (personally reviewed and interpreted image(s)): 12/5/20 ?CHF vs PNA    MARIA/DCCV 12/7/20 (images personally  reviewed and interpreted)  LVEF 60%  Normal RV fxn  Biatrial enlargement  No LA/VANESA thrombus  Mod MR  Mild-Mod AI  Mod AS, BASIL 1.5 cm2 (by planimetry)  Successful DCCV  BPM->SR 70 BPM 75J sync biphasic x1  Plan:  Cont eliquis 5mg bid  Dispo planning appropriate, pt to follow up with Dr. Llamas as planned.    Echo: 11/25/20  · The left ventricle is normal in size with normal systolic function. The estimated ejection fraction is 55%.  · There is left ventricular concentric hypertrophy.  · Moderate left atrial enlargement.  · Normal left ventricular diastolic function.  · Normal right ventricular size with normal right ventricular systolic function.  · Mild-to-moderate aortic valve stenosis.  · Aortic valve area is 1.61 cm2; peak velocity is 3.23 m/s; mean gradient is 25 mmHg.  · Moderate aortic regurgitation.  · Mild mitral regurgitation.  · No pulmonary hypertension    Stress Test: L MPI 11/25/20    Normal myocardial perfusion scan. There is no evidence of myocardial ischemia or infarction.    There is a  moderate intensity fixed defect in the inferior wall of the left ventricle secondary to diaphragm attenuation.    Gated perfusion images showed an ejection fraction of 48% post stress.    There is normal wall motion post stress.    The EKG portion of this study is abnormal but not diagnostic.    There were no arrhythmias during stress.

## 2020-12-08 NOTE — ASSESSMENT & PLAN NOTE
A1c:   Lab Results   Component Value Date    HGBA1C 11.2 (H) 11/24/2020     Meds: basal insulin + SSI PRN to maintain goal 140-180  ADA diet, accuchecks ACHS, hypoglycemic protocol

## 2020-12-08 NOTE — ASSESSMENT & PLAN NOTE
Patient presented with dry cough and persistent shortness of breath.  Became hypoxic and went for CTA which showed no evidence of a pulmonary embolism but noted patchy multifocal opacities within the right lower lobe as well as small bilateral pleural effusions with mild dependent consolidations with compressive atelectasis.  Patient does not appear in distress but was a non-rebreather mask.  Lung exam were consistent with volume overload and some expiratory wheezing  There is concern for some sort of infectious process.  Start patient on doxycycline.  Procalcitonin only 0.11.    COVID negative.   Started prednisone.   Slowly improving and weaning O2 as tolerated

## 2020-12-09 LAB
POCT GLUCOSE: 221 MG/DL (ref 70–110)
POCT GLUCOSE: 240 MG/DL (ref 70–110)
POCT GLUCOSE: 319 MG/DL (ref 70–110)
POCT GLUCOSE: 359 MG/DL (ref 70–110)

## 2020-12-09 PROCEDURE — 27000221 HC OXYGEN, UP TO 24 HOURS

## 2020-12-09 PROCEDURE — 25000242 PHARM REV CODE 250 ALT 637 W/ HCPCS: Performed by: STUDENT IN AN ORGANIZED HEALTH CARE EDUCATION/TRAINING PROGRAM

## 2020-12-09 PROCEDURE — 63600175 PHARM REV CODE 636 W HCPCS: Performed by: HOSPITALIST

## 2020-12-09 PROCEDURE — 25000003 PHARM REV CODE 250: Performed by: INTERNAL MEDICINE

## 2020-12-09 PROCEDURE — 94640 AIRWAY INHALATION TREATMENT: CPT

## 2020-12-09 PROCEDURE — 21400001 HC TELEMETRY ROOM

## 2020-12-09 PROCEDURE — 63600175 PHARM REV CODE 636 W HCPCS: Performed by: STUDENT IN AN ORGANIZED HEALTH CARE EDUCATION/TRAINING PROGRAM

## 2020-12-09 PROCEDURE — 25000003 PHARM REV CODE 250: Performed by: HOSPITALIST

## 2020-12-09 PROCEDURE — 94761 N-INVAS EAR/PLS OXIMETRY MLT: CPT

## 2020-12-09 PROCEDURE — 25000003 PHARM REV CODE 250: Performed by: STUDENT IN AN ORGANIZED HEALTH CARE EDUCATION/TRAINING PROGRAM

## 2020-12-09 RX ORDER — INSULIN ASPART 100 [IU]/ML
7 INJECTION, SOLUTION INTRAVENOUS; SUBCUTANEOUS
Status: DISCONTINUED | OUTPATIENT
Start: 2020-12-09 | End: 2020-12-11 | Stop reason: HOSPADM

## 2020-12-09 RX ORDER — INSULIN ASPART 100 [IU]/ML
3 INJECTION, SOLUTION INTRAVENOUS; SUBCUTANEOUS
Status: DISCONTINUED | OUTPATIENT
Start: 2020-12-09 | End: 2020-12-09

## 2020-12-09 RX ADMIN — THERA TABS 1 TABLET: TAB at 08:12

## 2020-12-09 RX ADMIN — INSULIN ASPART 3 UNITS: 100 INJECTION, SOLUTION INTRAVENOUS; SUBCUTANEOUS at 11:12

## 2020-12-09 RX ADMIN — LEVALBUTEROL HYDROCHLORIDE 0.63 MG: 0.63 SOLUTION RESPIRATORY (INHALATION) at 08:12

## 2020-12-09 RX ADMIN — MAGNESIUM OXIDE TAB 400 MG (241.3 MG ELEMENTAL MG) 400 MG: 400 (241.3 MG) TAB at 09:12

## 2020-12-09 RX ADMIN — Medication 100 MG: at 08:12

## 2020-12-09 RX ADMIN — INSULIN ASPART 5 UNITS: 100 INJECTION, SOLUTION INTRAVENOUS; SUBCUTANEOUS at 05:12

## 2020-12-09 RX ADMIN — LEVALBUTEROL HYDROCHLORIDE 0.63 MG: 0.63 SOLUTION RESPIRATORY (INHALATION) at 04:12

## 2020-12-09 RX ADMIN — MAGNESIUM OXIDE TAB 400 MG (241.3 MG ELEMENTAL MG) 400 MG: 400 (241.3 MG) TAB at 08:12

## 2020-12-09 RX ADMIN — DOXYCYCLINE 100 MG: 100 INJECTION, POWDER, LYOPHILIZED, FOR SOLUTION INTRAVENOUS at 09:12

## 2020-12-09 RX ADMIN — PREDNISONE 20 MG: 20 TABLET ORAL at 08:12

## 2020-12-09 RX ADMIN — METOPROLOL TARTRATE 50 MG: 50 TABLET, FILM COATED ORAL at 08:12

## 2020-12-09 RX ADMIN — INSULIN ASPART 7 UNITS: 100 INJECTION, SOLUTION INTRAVENOUS; SUBCUTANEOUS at 05:12

## 2020-12-09 RX ADMIN — METOPROLOL TARTRATE 50 MG: 50 TABLET, FILM COATED ORAL at 09:12

## 2020-12-09 RX ADMIN — FUROSEMIDE 40 MG: 40 TABLET ORAL at 08:12

## 2020-12-09 RX ADMIN — FENOFIBRATE 160 MG: 160 TABLET ORAL at 08:12

## 2020-12-09 RX ADMIN — APIXABAN 5 MG: 5 TABLET, FILM COATED ORAL at 09:12

## 2020-12-09 RX ADMIN — FUROSEMIDE 40 MG: 40 TABLET ORAL at 05:12

## 2020-12-09 RX ADMIN — DOXYCYCLINE 100 MG: 100 INJECTION, POWDER, LYOPHILIZED, FOR SOLUTION INTRAVENOUS at 11:12

## 2020-12-09 RX ADMIN — LEVALBUTEROL HYDROCHLORIDE 0.63 MG: 0.63 SOLUTION RESPIRATORY (INHALATION) at 12:12

## 2020-12-09 RX ADMIN — INSULIN ASPART 2 UNITS: 100 INJECTION, SOLUTION INTRAVENOUS; SUBCUTANEOUS at 09:12

## 2020-12-09 RX ADMIN — ATORVASTATIN CALCIUM 80 MG: 40 TABLET, FILM COATED ORAL at 09:12

## 2020-12-09 RX ADMIN — INSULIN ASPART 2 UNITS: 100 INJECTION, SOLUTION INTRAVENOUS; SUBCUTANEOUS at 08:12

## 2020-12-09 RX ADMIN — FLUTICASONE FUROATE AND VILANTEROL TRIFENATATE 1 PUFF: 200; 25 POWDER RESPIRATORY (INHALATION) at 08:12

## 2020-12-09 RX ADMIN — APIXABAN 5 MG: 5 TABLET, FILM COATED ORAL at 08:12

## 2020-12-09 NOTE — ASSESSMENT & PLAN NOTE
A1c:   Lab Results   Component Value Date    HGBA1C 11.2 (H) 11/24/2020     Meds: basal bolus insulin + SSI PRN to maintain goal 140-180. Increased for hyperglycemia  ADA diet, accuchecks ACHS, hypoglycemic protocol

## 2020-12-09 NOTE — SUBJECTIVE & OBJECTIVE
Interval History: Short of breath with walking. Otherwise feeling better. Desatted to 84% with ambulation this AM.      Review of Systems   Constitutional: Negative for chills and fever.   Respiratory: Positive for shortness of breath. Negative for cough.    Cardiovascular: Negative for chest pain and leg swelling.   Gastrointestinal: Negative for abdominal pain, nausea and vomiting.   Genitourinary: Negative for difficulty urinating.     Objective:     Vital Signs (Most Recent):  Temp: 98.7 °F (37.1 °C) (12/09/20 1132)  Pulse: 72 (12/09/20 1132)  Resp: 18 (12/09/20 1132)  BP: (!) 154/81 (12/09/20 1132)  SpO2: 95 % (12/09/20 1132) Vital Signs (24h Range):  Temp:  [97.1 °F (36.2 °C)-98.8 °F (37.1 °C)] 98.7 °F (37.1 °C)  Pulse:  [72-79] 72  Resp:  [16-20] 18  SpO2:  [90 %-99 %] 95 %  BP: (146-181)/(71-94) 154/81     Weight: 100.5 kg (221 lb 9 oz)  Body mass index is 30.9 kg/m².    Intake/Output Summary (Last 24 hours) at 12/9/2020 1229  Last data filed at 12/9/2020 0300  Gross per 24 hour   Intake 480 ml   Output 1050 ml   Net -570 ml      Physical Exam  Vitals signs and nursing note reviewed.   Constitutional:       General: He is not in acute distress.     Appearance: He is not toxic-appearing.   HENT:      Head: Normocephalic and atraumatic.      Nose: Nose normal.      Mouth/Throat:      Mouth: Mucous membranes are moist.   Cardiovascular:      Rate and Rhythm: Normal rate and regular rhythm.      Pulses: Normal pulses.      Heart sounds: Normal heart sounds. No murmur. No gallop.    Pulmonary:      Effort: Pulmonary effort is normal. No respiratory distress.      Breath sounds: Normal breath sounds. No wheezing or rales.      Comments: 2L O2 by NC  Abdominal:      General: Bowel sounds are normal. There is no distension.      Tenderness: There is no abdominal tenderness. There is no guarding.   Musculoskeletal:      Right lower leg: No edema.      Left lower leg: No edema.   Skin:     General: Skin is warm and  dry.   Neurological:      Mental Status: He is alert.         Significant Labs: All pertinent labs within the past 24 hours have been reviewed.    Significant Imaging: I have reviewed and interpreted all pertinent imaging results/findings within the past 24 hours.

## 2020-12-09 NOTE — PROGRESS NOTES
Testing for Home O2    O2 Sats on RA at rest =88%    O2 sats on RA with exercise  =84%    O2 sats on 2__L O2 with exercise =93%

## 2020-12-09 NOTE — PLAN OF CARE
Important Message from Medicare and discharge appeal process reviewed with patient; patient was given opportunity to ask questions; after which, verbalized understanding of rights; copy was placed in medical record chart and one copy given to patient for her review and records.       12/09/20 1411   Medicare Message   Important Message from Medicare regarding Discharge Appeal Rights Given to patient/caregiver;Explained to patient/caregiver;Signed/date by patient/caregiver   Date IMM was signed 12/09/20   Time IMM was signed 1219

## 2020-12-09 NOTE — PROGRESS NOTES
[facility] Hospital Medicine  Progress Note    Patient Name: Chato Bowie  MRN: [id number]  Patient Class: IP- Inpatient   Admission Date: 12/4/2020  Length of Stay: 4 days  Attending Physician: Wen Polk MD  Primary Care Provider: [facility]        Subjective:     Principal Problem:Acute hypoxemic respiratory failure        HPI:  Mr. Marie is a 70-year-old male with a past medical history of coronary artery disease, hypertension, diabetes, BPH, alcohol use who presents after he has been having a cough for 1 week.  The patient was recently admitted for chest pain and underwent nuclear stress test.  She had a fixed defect the patient was discharged with follow-up.  The patient states that ever since he was discharged, he has been having a dry cough and it has gotten so bad that it makes his chest hurt.  He is not producing any sputum.  Otherwise, he does not have chest pain on exertion.  Only when he coughs.  He denies fevers or chills but endorses shortness of breath with exertion in anything that he does.  He is not able to lie flat.  His lower extremities do have some swelling he states that this is his baseline and not worse than usual.  He denies smoking and denies any history of lung disease.  However he does believe that he may have some sort of asthma. The patient states he has not had any alcohol since he was discharged last week.    Overview/Hospital Course:  Admitted for chest pain, cough and hypoxemic respiratory failure. Found to be afib/flutter and concerned for pneumonia and volume overload. Started on antibiotics and IV diuresis. Cardiology consulted and MARIA/cardioversion on 12/7. TTE no pulmonary hypertension, normal EF and diastolic function. Still hypoxic. Started doxycycline, prednisone, and PO lasix. Still hypoxic with ambulation and repeating workup.     Interval History: Short of breath with walking. Otherwise feeling better. Desatted to 84% with ambulation  this AM.      Review of Systems   Constitutional: Negative for chills and fever.   Respiratory: Positive for shortness of breath. Negative for cough.    Cardiovascular: Negative for chest pain and leg swelling.   Gastrointestinal: Negative for abdominal pain, nausea and vomiting.   Genitourinary: Negative for difficulty urinating.     Objective:     Vital Signs (Most Recent):  Temp: 98.7 °F (37.1 °C) (12/09/20 1132)  Pulse: 72 (12/09/20 1132)  Resp: 18 (12/09/20 1132)  BP: (!) 154/81 (12/09/20 1132)  SpO2: 95 % (12/09/20 1132) Vital Signs (24h Range):  Temp:  [97.1 °F (36.2 °C)-98.8 °F (37.1 °C)] 98.7 °F (37.1 °C)  Pulse:  [72-79] 72  Resp:  [16-20] 18  SpO2:  [90 %-99 %] 95 %  BP: (146-181)/(71-94) 154/81     Weight: 100.5 kg (221 lb 9 oz)  Body mass index is 30.9 kg/m².    Intake/Output Summary (Last 24 hours) at 12/9/2020 1229  Last data filed at 12/9/2020 0300  Gross per 24 hour   Intake 480 ml   Output 1050 ml   Net -570 ml      Physical Exam  Vitals signs and nursing note reviewed.   Constitutional:       General: He is not in acute distress.     Appearance: He is not toxic-appearing.   HENT:      Head: Normocephalic and atraumatic.      Nose: Nose normal.      Mouth/Throat:      Mouth: Mucous membranes are moist.   Cardiovascular:      Rate and Rhythm: Normal rate and regular rhythm.      Pulses: Normal pulses.      Heart sounds: Normal heart sounds. No murmur. No gallop.    Pulmonary:      Effort: Pulmonary effort is normal. No respiratory distress.      Breath sounds: Normal breath sounds. No wheezing or rales.      Comments: 2L O2 by NC  Abdominal:      General: Bowel sounds are normal. There is no distension.      Tenderness: There is no abdominal tenderness. There is no guarding.   Musculoskeletal:      Right lower leg: No edema.      Left lower leg: No edema.   Skin:     General: Skin is warm and dry.   Neurological:      Mental Status: He is alert.         Significant Labs: All pertinent labs within  the past 24 hours have been reviewed.    Significant Imaging: I have reviewed and interpreted all pertinent imaging results/findings within the past 24 hours.      Assessment/Plan:      * Acute hypoxemic respiratory failure  Patient presented with dry cough and persistent shortness of breath.  Became hypoxic and went for CTA which showed no evidence of a pulmonary embolism but noted patchy multifocal opacities within the right lower lobe as well as small bilateral pleural effusions with mild dependent consolidations with compressive atelectasis.  Patient does not appear in distress but was a non-rebreather mask.  Lung exam were consistent with volume overload and some expiratory wheezing  There is concern for some sort of infectious process.  Started patient on doxycycline.  Procalcitonin only 0.11.    COVID negative.   Started prednisone.   Slowly improving and weaning O2 as tolerated  Still hypoxic with ambulation. Repeat CXR, BNP, procal.     Atrial flutter  Paroxysmal  Metoprolol HR control, eliquis anticoagulation      Other chest pain  Patient describes his chest pain is only when he coughs which has been going on for 1 week.  He also describes the pain in his abdomen.  Denies chest pain on exertion, EKG reviewed troponin downtrending.  Do not suspect ACS at this time.  Continue with aspirin and statin and symptom relief  Resolved      Pneumonia of right lower lobe due to infectious organism  Patient gives a history a dry cough with shortness of breath and is now hypoxic.  He does have evidence of volume overload however on CT  it showed that he had multifocal patchy opacities in right lower lobe.  More likely to be atypical in nature given his symptoms.  COVID negative.  Started doxycycline.  Continue for total of 5 days considering exam findings and clinical presentation.  Wean off oxygen as tolerated      Aortic stenosis, moderate  Noted on previous echo      CKD (chronic kidney disease), stage III  Appears  to be at baseline.  Will continue to monitor      Acute on chronic diastolic congestive heart failure  Patient has a history of low normal EF and diastolic dysfunction.  Last echocardiogram 1 week ago showed improved EF and no diastolic heart dysfunction.  Concern for some mild volume overload at this time.  Continue lasix diuresis.  Patient reports compliance with his home medication  Still requiring O2. Repeat CXR with pulmonary congestion but no clear edema. BNP pending.     Alcohol abuse  Patient has a history of alcohol abuse however denies any recent used in the past week.  Not showing any signs or symptoms of withdrawal at this time.  Continue with thiamine and multivitamin      Hypokalemia  Will replace and monitor      GERD (gastroesophageal reflux disease)  Not on any medications at home.  Will monitor for now.      Anemia of chronic disease  Hemoglobin appears slightly lower than on previous admissions.  No evidence of bleeding at this time.      Type 2 diabetes mellitus, uncontrolled  A1c:   Lab Results   Component Value Date    HGBA1C 11.2 (H) 11/24/2020     Meds: basal bolus insulin + SSI PRN to maintain goal 140-180. Increased for hyperglycemia  ADA diet, accuchecks ACHS, hypoglycemic protocol          Hyperlipidemia  Continue with home statin      Essential hypertension  Started on metoprolol 25 mg p.o. b.i.d. for Afib rate control.  Will continue to monitor.      CAD (coronary artery disease)  History of coronary artery disease.  Continue with aspirin statin      Paroxysmal atrial fibrillation  EKG reviewed and noted wide complex tachycardia, not consistent VT.  Reviewed older EKGs and there are changes.    On admission, patient appears to be in atrial flutter/fibrillation with an irregular rhythm on telemetry but rate is 80s. Will continue to monitor, patient states he's been compliant with his home medications.   Cardiology consulted   Patient started on Eliquis 5 mg p.o. b.i.d. and cardioversion  12/7  Started on metoprolol 25 b.i.d. for rate control        VTE Risk Mitigation (From admission, onward)         Ordered     apixaban tablet 5 mg  2 times daily      12/05/20 1138     IP VTE HIGH RISK PATIENT  Once      12/05/20 0556     Place sequential compression device  Until discontinued      12/05/20 0556                Discharge Planning   ELY:      Code Status: Full Code   Is the patient medically ready for discharge?:     Reason for patient still in hospital (select all that apply): Patient trending condition  Discharge Plan A: Home Health   Discharge Delays: None known at this time              Wen Polk MD  Department of Hospital Medicine   [facility]

## 2020-12-09 NOTE — ASSESSMENT & PLAN NOTE
Patient presented with dry cough and persistent shortness of breath.  Became hypoxic and went for CTA which showed no evidence of a pulmonary embolism but noted patchy multifocal opacities within the right lower lobe as well as small bilateral pleural effusions with mild dependent consolidations with compressive atelectasis.  Patient does not appear in distress but was a non-rebreather mask.  Lung exam were consistent with volume overload and some expiratory wheezing  There is concern for some sort of infectious process.  Started patient on doxycycline.  Procalcitonin only 0.11.    COVID negative.   Started prednisone.   Slowly improving and weaning O2 as tolerated  Still hypoxic with ambulation. Repeat CXR, BNP, procal.

## 2020-12-09 NOTE — PLAN OF CARE
Problem: Adult Inpatient Plan of Care  Goal: Plan of Care Review  Outcome: Ongoing, Progressing     Problem: Adult Inpatient Plan of Care  Goal: Patient-Specific Goal (Individualization)  Outcome: Ongoing, Progressing     Problem: Diabetes Comorbidity  Goal: Blood Glucose Level Within Desired Range  Outcome: Ongoing, Progressing       Pt remained stable throughout shift.  No c/o of pain.  Pt O2 on RA was 90.  Will put O2 back on at 2L to maintain above 92%.  Will continue to monitor.

## 2020-12-09 NOTE — ASSESSMENT & PLAN NOTE
Patient has a history of low normal EF and diastolic dysfunction.  Last echocardiogram 1 week ago showed improved EF and no diastolic heart dysfunction.  Concern for some mild volume overload at this time.  Continue lasix diuresis.  Patient reports compliance with his home medication  Still requiring O2. Repeat CXR with pulmonary congestion but no clear edema. BNP pending.

## 2020-12-10 LAB
ANION GAP SERPL CALC-SCNC: 10 MMOL/L (ref 8–16)
BASOPHILS # BLD AUTO: 0.1 K/UL (ref 0–0.2)
BASOPHILS NFR BLD: 1 % (ref 0–1.9)
BNP SERPL-MCNC: 1179 PG/ML (ref 0–99)
BUN SERPL-MCNC: 29 MG/DL (ref 8–23)
CALCIUM SERPL-MCNC: 9.2 MG/DL (ref 8.7–10.5)
CHLORIDE SERPL-SCNC: 98 MMOL/L (ref 95–110)
CO2 SERPL-SCNC: 33 MMOL/L (ref 23–29)
CREAT SERPL-MCNC: 1.5 MG/DL (ref 0.5–1.4)
DIFFERENTIAL METHOD: ABNORMAL
EOSINOPHIL # BLD AUTO: 0.1 K/UL (ref 0–0.5)
EOSINOPHIL NFR BLD: 0.8 % (ref 0–8)
ERYTHROCYTE [DISTWIDTH] IN BLOOD BY AUTOMATED COUNT: 13.2 % (ref 11.5–14.5)
EST. GFR  (AFRICAN AMERICAN): 54 ML/MIN/1.73 M^2
EST. GFR  (NON AFRICAN AMERICAN): 46 ML/MIN/1.73 M^2
GLUCOSE SERPL-MCNC: 183 MG/DL (ref 70–110)
HCT VFR BLD AUTO: 28.5 % (ref 40–54)
HGB BLD-MCNC: 9.5 G/DL (ref 14–18)
IMM GRANULOCYTES # BLD AUTO: 0.08 K/UL (ref 0–0.04)
IMM GRANULOCYTES NFR BLD AUTO: 0.8 % (ref 0–0.5)
LYMPHOCYTES # BLD AUTO: 1.5 K/UL (ref 1–4.8)
LYMPHOCYTES NFR BLD: 14.7 % (ref 18–48)
MAGNESIUM SERPL-MCNC: 1.5 MG/DL (ref 1.6–2.6)
MCH RBC QN AUTO: 27.8 PG (ref 27–31)
MCHC RBC AUTO-ENTMCNC: 33.3 G/DL (ref 32–36)
MCV RBC AUTO: 83 FL (ref 82–98)
MONOCYTES # BLD AUTO: 1.1 K/UL (ref 0.3–1)
MONOCYTES NFR BLD: 10.9 % (ref 4–15)
NEUTROPHILS # BLD AUTO: 7.5 K/UL (ref 1.8–7.7)
NEUTROPHILS NFR BLD: 71.8 % (ref 38–73)
NRBC BLD-RTO: 0 /100 WBC
PLATELET # BLD AUTO: 371 K/UL (ref 150–350)
PMV BLD AUTO: 9.3 FL (ref 9.2–12.9)
POCT GLUCOSE: 125 MG/DL (ref 70–110)
POCT GLUCOSE: 127 MG/DL (ref 70–110)
POCT GLUCOSE: 154 MG/DL (ref 70–110)
POCT GLUCOSE: 200 MG/DL (ref 70–110)
POTASSIUM SERPL-SCNC: 3.2 MMOL/L (ref 3.5–5.1)
RBC # BLD AUTO: 3.42 M/UL (ref 4.6–6.2)
SODIUM SERPL-SCNC: 141 MMOL/L (ref 136–145)
WBC # BLD AUTO: 10.48 K/UL (ref 3.9–12.7)

## 2020-12-10 PROCEDURE — 83880 ASSAY OF NATRIURETIC PEPTIDE: CPT

## 2020-12-10 PROCEDURE — 25000242 PHARM REV CODE 250 ALT 637 W/ HCPCS: Performed by: STUDENT IN AN ORGANIZED HEALTH CARE EDUCATION/TRAINING PROGRAM

## 2020-12-10 PROCEDURE — 80048 BASIC METABOLIC PNL TOTAL CA: CPT

## 2020-12-10 PROCEDURE — 94640 AIRWAY INHALATION TREATMENT: CPT

## 2020-12-10 PROCEDURE — 94761 N-INVAS EAR/PLS OXIMETRY MLT: CPT

## 2020-12-10 PROCEDURE — 25000003 PHARM REV CODE 250: Performed by: INTERNAL MEDICINE

## 2020-12-10 PROCEDURE — 83735 ASSAY OF MAGNESIUM: CPT

## 2020-12-10 PROCEDURE — 21400001 HC TELEMETRY ROOM

## 2020-12-10 PROCEDURE — 25000003 PHARM REV CODE 250: Performed by: HOSPITALIST

## 2020-12-10 PROCEDURE — 25000003 PHARM REV CODE 250: Performed by: STUDENT IN AN ORGANIZED HEALTH CARE EDUCATION/TRAINING PROGRAM

## 2020-12-10 PROCEDURE — 36415 COLL VENOUS BLD VENIPUNCTURE: CPT

## 2020-12-10 PROCEDURE — 85025 COMPLETE CBC W/AUTO DIFF WBC: CPT

## 2020-12-10 PROCEDURE — 63600175 PHARM REV CODE 636 W HCPCS: Performed by: HOSPITALIST

## 2020-12-10 RX ORDER — FUROSEMIDE 10 MG/ML
40 INJECTION INTRAMUSCULAR; INTRAVENOUS ONCE
Status: COMPLETED | OUTPATIENT
Start: 2020-12-10 | End: 2020-12-10

## 2020-12-10 RX ORDER — CARVEDILOL 12.5 MG/1
25 TABLET ORAL 2 TIMES DAILY
Status: DISCONTINUED | OUTPATIENT
Start: 2020-12-10 | End: 2020-12-11 | Stop reason: HOSPADM

## 2020-12-10 RX ORDER — POTASSIUM CHLORIDE 20 MEQ/1
40 TABLET, EXTENDED RELEASE ORAL ONCE
Status: COMPLETED | OUTPATIENT
Start: 2020-12-10 | End: 2020-12-10

## 2020-12-10 RX ORDER — FUROSEMIDE 10 MG/ML
40 INJECTION INTRAMUSCULAR; INTRAVENOUS 2 TIMES DAILY
Status: DISCONTINUED | OUTPATIENT
Start: 2020-12-10 | End: 2020-12-10

## 2020-12-10 RX ORDER — POLYETHYLENE GLYCOL 3350 17 G/17G
17 POWDER, FOR SOLUTION ORAL DAILY PRN
Status: DISCONTINUED | OUTPATIENT
Start: 2020-12-10 | End: 2020-12-11 | Stop reason: HOSPADM

## 2020-12-10 RX ORDER — CARVEDILOL 12.5 MG/1
25 TABLET ORAL 2 TIMES DAILY
Qty: 360 TABLET | Refills: 3 | Status: SHIPPED | OUTPATIENT
Start: 2020-12-10 | End: 2020-12-11 | Stop reason: HOSPADM

## 2020-12-10 RX ORDER — FUROSEMIDE 40 MG/1
40 TABLET ORAL 2 TIMES DAILY
Status: DISCONTINUED | OUTPATIENT
Start: 2020-12-10 | End: 2020-12-10

## 2020-12-10 RX ORDER — ATORVASTATIN CALCIUM 80 MG/1
80 TABLET, FILM COATED ORAL NIGHTLY
Qty: 90 TABLET | Refills: 3 | Status: ON HOLD | OUTPATIENT
Start: 2020-12-10 | End: 2021-03-23

## 2020-12-10 RX ORDER — FUROSEMIDE 10 MG/ML
60 INJECTION INTRAMUSCULAR; INTRAVENOUS 2 TIMES DAILY
Status: DISCONTINUED | OUTPATIENT
Start: 2020-12-10 | End: 2020-12-11 | Stop reason: HOSPADM

## 2020-12-10 RX ORDER — POLYETHYLENE GLYCOL 3350 17 G/17G
17 POWDER, FOR SOLUTION ORAL DAILY
Status: DISCONTINUED | OUTPATIENT
Start: 2020-12-10 | End: 2020-12-10

## 2020-12-10 RX ORDER — AMOXICILLIN 250 MG
2 CAPSULE ORAL 2 TIMES DAILY
Status: DISCONTINUED | OUTPATIENT
Start: 2020-12-10 | End: 2020-12-11 | Stop reason: HOSPADM

## 2020-12-10 RX ADMIN — LEVALBUTEROL HYDROCHLORIDE 0.63 MG: 0.63 SOLUTION RESPIRATORY (INHALATION) at 08:12

## 2020-12-10 RX ADMIN — APIXABAN 5 MG: 5 TABLET, FILM COATED ORAL at 08:12

## 2020-12-10 RX ADMIN — FUROSEMIDE 40 MG: 10 INJECTION, SOLUTION INTRAMUSCULAR; INTRAVENOUS at 08:12

## 2020-12-10 RX ADMIN — THERA TABS 1 TABLET: TAB at 08:12

## 2020-12-10 RX ADMIN — METOPROLOL TARTRATE 50 MG: 50 TABLET, FILM COATED ORAL at 08:12

## 2020-12-10 RX ADMIN — ATORVASTATIN CALCIUM 80 MG: 40 TABLET, FILM COATED ORAL at 08:12

## 2020-12-10 RX ADMIN — FLUTICASONE FUROATE AND VILANTEROL TRIFENATATE 1 PUFF: 200; 25 POWDER RESPIRATORY (INHALATION) at 09:12

## 2020-12-10 RX ADMIN — MAGNESIUM OXIDE TAB 400 MG (241.3 MG ELEMENTAL MG) 400 MG: 400 (241.3 MG) TAB at 08:12

## 2020-12-10 RX ADMIN — DOCUSATE SODIUM 50 MG AND SENNOSIDES 8.6 MG 2 TABLET: 8.6; 5 TABLET, FILM COATED ORAL at 08:12

## 2020-12-10 RX ADMIN — FENOFIBRATE 160 MG: 160 TABLET ORAL at 08:12

## 2020-12-10 RX ADMIN — Medication 100 MG: at 08:12

## 2020-12-10 RX ADMIN — INSULIN ASPART 7 UNITS: 100 INJECTION, SOLUTION INTRAVENOUS; SUBCUTANEOUS at 04:12

## 2020-12-10 RX ADMIN — POTASSIUM CHLORIDE 40 MEQ: 1500 TABLET, EXTENDED RELEASE ORAL at 08:12

## 2020-12-10 RX ADMIN — INSULIN ASPART 7 UNITS: 100 INJECTION, SOLUTION INTRAVENOUS; SUBCUTANEOUS at 11:12

## 2020-12-10 RX ADMIN — LEVALBUTEROL HYDROCHLORIDE 0.63 MG: 0.63 SOLUTION RESPIRATORY (INHALATION) at 03:12

## 2020-12-10 RX ADMIN — CARVEDILOL 25 MG: 12.5 TABLET, FILM COATED ORAL at 08:12

## 2020-12-10 RX ADMIN — INSULIN ASPART 7 UNITS: 100 INJECTION, SOLUTION INTRAVENOUS; SUBCUTANEOUS at 08:12

## 2020-12-10 RX ADMIN — POTASSIUM CHLORIDE 40 MEQ: 1500 TABLET, EXTENDED RELEASE ORAL at 01:12

## 2020-12-10 RX ADMIN — POLYETHYLENE GLYCOL 3350 17 G: 17 POWDER, FOR SOLUTION ORAL at 08:12

## 2020-12-10 RX ADMIN — LEVALBUTEROL HYDROCHLORIDE 0.63 MG: 0.63 SOLUTION RESPIRATORY (INHALATION) at 12:12

## 2020-12-10 RX ADMIN — FUROSEMIDE 60 MG: 10 INJECTION, SOLUTION INTRAMUSCULAR; INTRAVENOUS at 09:12

## 2020-12-10 NOTE — ASSESSMENT & PLAN NOTE
Hemoglobin appears slightly lower than on previous admissions but is stable.  No evidence of bleeding at this time.

## 2020-12-10 NOTE — ASSESSMENT & PLAN NOTE
Patient presented with dry cough and persistent shortness of breath.  Became hypoxic and went for CTA which showed no evidence of a pulmonary embolism but noted patchy multifocal opacities within the right lower lobe as well as small bilateral pleural effusions with mild dependent consolidations with compressive atelectasis.  Patient does not appear in distress but was a non-rebreather mask.  Lung exam were consistent with volume overload and some expiratory wheezing  There is concern for some sort of infectious process.  Completed 5 days doxycycline and prednisone- now discontinued.     COVID negative.   Still hypoxic on 12/9 with ambulation. CXR with pulmonary vascular congestion. Repeat BNP pending. Resume IV lasix.

## 2020-12-10 NOTE — ASSESSMENT & PLAN NOTE
A1c:   Lab Results   Component Value Date    HGBA1C 11.2 (H) 11/24/2020     Meds: basal bolus insulin + SSI PRN to maintain goal 140-180. Increased again for hyperglycemia  ADA diet, accuchecks ACHS, hypoglycemic protocol

## 2020-12-10 NOTE — ASSESSMENT & PLAN NOTE
Patient has a history of low normal EF and diastolic dysfunction.  Last echocardiogram 1 week ago showed improved EF and no diastolic heart dysfunction.  Concern for some mild volume overload at this time.  Continue lasix diuresis.  Patient reports compliance with his home medication  Still requiring O2. Repeat CXR with pulmonary congestion but no clear edema. BNP pending.   Dose IV lasix this AM

## 2020-12-10 NOTE — CONSULTS
Patient chart reviewed-    Appears appropriate for [facility] medicine management. Will begin care for patient at 7PM

## 2020-12-10 NOTE — RESPIRATORY THERAPY
Testing for Home O2    O2 Sats on RA at rest =90    O2 sats on RA with exercise  =85%      O2 sats on _2_L O2 with exercise =92%    96% on 2lpm recovery at rest

## 2020-12-10 NOTE — ASSESSMENT & PLAN NOTE
Patient has a history of alcohol abuse however denies any recent used in the past week.  Not showing any signs or symptoms of withdrawal at this time.

## 2020-12-10 NOTE — SUBJECTIVE & OBJECTIVE
Interval History: No shortness of breath at rest and cough resolved. SpO2 90% on room air at rest. Walking test pending.     Review of Systems   Constitutional: Negative for chills and fever.   Respiratory: Negative for cough and shortness of breath.    Cardiovascular: Negative for chest pain and leg swelling.   Gastrointestinal: Negative for abdominal pain, nausea and vomiting.   Genitourinary: Negative for difficulty urinating.     Objective:     Vital Signs (Most Recent):  Temp: 98.3 °F (36.8 °C) (12/10/20 0748)  Pulse: 77 (12/10/20 0901)  Resp: 18 (12/10/20 0901)  BP: (!) 179/83 (12/10/20 0748)  SpO2: (!) 91 % (12/10/20 0857) Vital Signs (24h Range):  Temp:  [98 °F (36.7 °C)-98.8 °F (37.1 °C)] 98.3 °F (36.8 °C)  Pulse:  [70-77] 77  Resp:  [16-19] 18  SpO2:  [91 %-98 %] 91 %  BP: (140-179)/(74-96) 179/83     Weight: 100 kg (220 lb 7.4 oz)  Body mass index is 30.75 kg/m².    Intake/Output Summary (Last 24 hours) at 12/10/2020 0934  Last data filed at 12/9/2020 2343  Gross per 24 hour   Intake 730 ml   Output 1775 ml   Net -1045 ml      Physical Exam  Vitals signs and nursing note reviewed.   Constitutional:       General: He is not in acute distress.     Appearance: He is not toxic-appearing.   HENT:      Head: Normocephalic and atraumatic.      Nose: Nose normal.      Mouth/Throat:      Mouth: Mucous membranes are moist.   Cardiovascular:      Rate and Rhythm: Normal rate and regular rhythm.      Pulses: Normal pulses.      Heart sounds: Normal heart sounds. No murmur. No gallop.    Pulmonary:      Effort: Pulmonary effort is normal. No respiratory distress.      Breath sounds: Normal breath sounds. No wheezing or rales.      Comments: Diminished breath sounds. On room air  Abdominal:      General: Bowel sounds are normal. There is no distension.      Tenderness: There is no abdominal tenderness. There is no guarding.   Musculoskeletal:      Right lower leg: No edema.      Left lower leg: No edema.   Skin:      General: Skin is warm and dry.   Neurological:      Mental Status: He is alert.         Significant Labs: All pertinent labs within the past 24 hours have been reviewed.    Significant Imaging: I have reviewed and interpreted all pertinent imaging results/findings within the past 24 hours.

## 2020-12-10 NOTE — PROGRESS NOTES
[facility] Hospital Medicine  Progress Note    Patient Name: Chato Bowie  MRN: [id number]  Patient Class: IP- Inpatient   Admission Date: 12/4/2020  Length of Stay: 5 days  Attending Physician: Wen Polk MD  Primary Care Provider: [facility]        Subjective:     Principal Problem:Acute hypoxemic respiratory failure        HPI:  Mr. Marie is a 70-year-old male with a past medical history of coronary artery disease, hypertension, diabetes, BPH, alcohol use who presents after he has been having a cough for 1 week.  The patient was recently admitted for chest pain and underwent nuclear stress test.  She had a fixed defect the patient was discharged with follow-up.  The patient states that ever since he was discharged, he has been having a dry cough and it has gotten so bad that it makes his chest hurt.  He is not producing any sputum.  Otherwise, he does not have chest pain on exertion.  Only when he coughs.  He denies fevers or chills but endorses shortness of breath with exertion in anything that he does.  He is not able to lie flat.  His lower extremities do have some swelling he states that this is his baseline and not worse than usual.  He denies smoking and denies any history of lung disease.  However he does believe that he may have some sort of asthma. The patient states he has not had any alcohol since he was discharged last week.    Overview/Hospital Course:  Admitted for chest pain, cough and hypoxemic respiratory failure. Found to be afib/flutter and concerned for pneumonia and volume overload. Started on antibiotics and IV diuresis. Cardiology consulted and MARIA/cardioversion on 12/7. TTE no pulmonary hypertension, normal EF and diastolic function. Still hypoxic. Started doxycycline, prednisone, and PO lasix. Still hypoxic with ambulation.     Interval History: No shortness of breath at rest and cough resolved. SpO2 90% on room air at rest. Walking test pending.      Review of Systems   Constitutional: Negative for chills and fever.   Respiratory: Negative for cough and shortness of breath.    Cardiovascular: Negative for chest pain and leg swelling.   Gastrointestinal: Negative for abdominal pain, nausea and vomiting.   Genitourinary: Negative for difficulty urinating.     Objective:     Vital Signs (Most Recent):  Temp: 98.3 °F (36.8 °C) (12/10/20 0748)  Pulse: 77 (12/10/20 0901)  Resp: 18 (12/10/20 0901)  BP: (!) 179/83 (12/10/20 0748)  SpO2: (!) 91 % (12/10/20 0857) Vital Signs (24h Range):  Temp:  [98 °F (36.7 °C)-98.8 °F (37.1 °C)] 98.3 °F (36.8 °C)  Pulse:  [70-77] 77  Resp:  [16-19] 18  SpO2:  [91 %-98 %] 91 %  BP: (140-179)/(74-96) 179/83     Weight: 100 kg (220 lb 7.4 oz)  Body mass index is 30.75 kg/m².    Intake/Output Summary (Last 24 hours) at 12/10/2020 0934  Last data filed at 12/9/2020 2343  Gross per 24 hour   Intake 730 ml   Output 1775 ml   Net -1045 ml      Physical Exam  Vitals signs and nursing note reviewed.   Constitutional:       General: He is not in acute distress.     Appearance: He is not toxic-appearing.   HENT:      Head: Normocephalic and atraumatic.      Nose: Nose normal.      Mouth/Throat:      Mouth: Mucous membranes are moist.   Cardiovascular:      Rate and Rhythm: Normal rate and regular rhythm.      Pulses: Normal pulses.      Heart sounds: Normal heart sounds. No murmur. No gallop.    Pulmonary:      Effort: Pulmonary effort is normal. No respiratory distress.      Breath sounds: Normal breath sounds. No wheezing or rales.      Comments: Diminished breath sounds. On room air  Abdominal:      General: Bowel sounds are normal. There is no distension.      Tenderness: There is no abdominal tenderness. There is no guarding.   Musculoskeletal:      Right lower leg: No edema.      Left lower leg: No edema.   Skin:     General: Skin is warm and dry.   Neurological:      Mental Status: He is alert.         Significant Labs: All pertinent  labs within the past 24 hours have been reviewed.    Significant Imaging: I have reviewed and interpreted all pertinent imaging results/findings within the past 24 hours.      Assessment/Plan:      * Acute hypoxemic respiratory failure  Patient presented with dry cough and persistent shortness of breath.  Became hypoxic and went for CTA which showed no evidence of a pulmonary embolism but noted patchy multifocal opacities within the right lower lobe as well as small bilateral pleural effusions with mild dependent consolidations with compressive atelectasis.  Patient does not appear in distress but was a non-rebreather mask.  Lung exam were consistent with volume overload and some expiratory wheezing  There is concern for some sort of infectious process.  Completed 5 days doxycycline and prednisone- now discontinued.     COVID negative.   Still hypoxic on 12/9 with ambulation. CXR with pulmonary vascular congestion. Repeat BNP pending. Resume IV lasix.     Atrial flutter  Paroxysmal  Coreg HR control, eliquis anticoagulation      Other chest pain  Patient describes his chest pain is only when he coughs which has been going on for 1 week.  He also describes the pain in his abdomen.  Denies chest pain on exertion, EKG reviewed troponin downtrending.  Do not suspect ACS at this time.  Continue with aspirin and statin and symptom relief  Resolved      Pneumonia of right lower lobe due to infectious organism  Patient gives a history a dry cough with shortness of breath and is now hypoxic.  He does have evidence of volume overload however on CT  it showed that he had multifocal patchy opacities in right lower lobe.  More likely to be atypical in nature given his symptoms.  COVID negative.  Started doxycycline x5 days.        Aortic stenosis, moderate  Noted on previous echo      Hypomagnesemia  Replace and monitor        CKD (chronic kidney disease), stage III  Appears to be at baseline.  Will continue to  monitor      Acute on chronic diastolic congestive heart failure  Patient has a history of low normal EF and diastolic dysfunction.  Last echocardiogram 1 week ago showed improved EF and no diastolic heart dysfunction.  Concern for some mild volume overload at this time.  Continue lasix diuresis.  Patient reports compliance with his home medication  Still requiring O2. Repeat CXR with pulmonary congestion but no clear edema. BNP pending.   Dose IV lasix this AM    Alcohol abuse  Patient has a history of alcohol abuse however denies any recent used in the past week.  Not showing any signs or symptoms of withdrawal at this time.     Hypokalemia  Will replace and monitor      GERD (gastroesophageal reflux disease)  Not on any medications at home.  Will monitor for now.      Anemia of chronic disease  Hemoglobin appears slightly lower than on previous admissions but is stable.  No evidence of bleeding at this time.      Type 2 diabetes mellitus, uncontrolled  A1c:   Lab Results   Component Value Date    HGBA1C 11.2 (H) 11/24/2020     Meds: basal bolus insulin + SSI PRN to maintain goal 140-180. Increased again for hyperglycemia  ADA diet, accuchecks ACHS, hypoglycemic protocol          Hyperlipidemia  Continue with home statin      Essential hypertension  Resumed home coreg for BP and HR control       CAD (coronary artery disease)  History of coronary artery disease.  Continue with aspirin statin      Paroxysmal atrial fibrillation  EKG reviewed and noted wide complex tachycardia, not consistent VT.  Reviewed older EKGs and there are changes.    On admission, patient appears to be in atrial flutter/fibrillation with an irregular rhythm on telemetry but rate is 80s. Will continue to monitor, patient states he's been compliant with his home medications.   Cardiology consulted   Patient started on Eliquis 5 mg p.o. b.i.d. and cardioversion 12/7  Resumed home coreg for HR control        VTE Risk Mitigation (From admission,  onward)         Ordered     apixaban tablet 5 mg  2 times daily      12/05/20 1138     IP VTE HIGH RISK PATIENT  Once      12/05/20 0556     Place sequential compression device  Until discontinued      12/05/20 0556                Discharge Planning   ELY:      Code Status: Full Code   Is the patient medically ready for discharge?:     Reason for patient still in hospital (select all that apply): Patient trending condition  Discharge Plan A: Home Health   Discharge Delays: None known at this time      12:10 PM  BNP>1100, higher than previously. Suspect pulmonary edema as cause of continued hypoxia. Continue IV lasix diuresis.         Wen Polk MD  Department of Hospital Medicine   [facility]

## 2020-12-10 NOTE — ASSESSMENT & PLAN NOTE
EKG reviewed and noted wide complex tachycardia, not consistent VT.  Reviewed older EKGs and there are changes.    On admission, patient appears to be in atrial flutter/fibrillation with an irregular rhythm on telemetry but rate is 80s. Will continue to monitor, patient states he's been compliant with his home medications.   Cardiology consulted   Patient started on Eliquis 5 mg p.o. b.i.d. and cardioversion 12/7  Resumed home coreg for HR control

## 2020-12-10 NOTE — ASSESSMENT & PLAN NOTE
Patient gives a history a dry cough with shortness of breath and is now hypoxic.  He does have evidence of volume overload however on CT  it showed that he had multifocal patchy opacities in right lower lobe.  More likely to be atypical in nature given his symptoms.  COVID negative.  Started doxycycline x5 days.

## 2020-12-10 NOTE — NURSING
Report given to Adrianne COOK. Pt resting comfortably, eyes closed. 2L O2 in use nasal cannula. No acute distress noted. Safety precautions maintained.     Chart check completed.

## 2020-12-10 NOTE — PLAN OF CARE
Problem: Fall Injury Risk  Goal: Absence of Fall and Fall-Related Injury  Outcome: Ongoing, Progressing     Problem: Adult Inpatient Plan of Care  Goal: Plan of Care Review  Outcome: Ongoing, Progressing  Goal: Patient-Specific Goal (Individualization)  Outcome: Ongoing, Progressing  Goal: Absence of Hospital-Acquired Illness or Injury  Outcome: Ongoing, Progressing  Goal: Optimal Comfort and Wellbeing  Outcome: Ongoing, Progressing  Goal: Readiness for Transition of Care  Outcome: Ongoing, Progressing  Goal: Rounds/Family Conference  Outcome: Ongoing, Progressing     Problem: Diabetes Comorbidity  Goal: Blood Glucose Level Within Desired Range  Outcome: Ongoing, Progressing     Problem: Adjustment to Illness (Sepsis/Septic Shock)  Goal: Optimal Coping  Outcome: Ongoing, Progressing     Problem: Bleeding (Sepsis/Septic Shock)  Goal: Absence of Bleeding  Outcome: Ongoing, Progressing     Problem: Glycemic Control Impaired (Sepsis/Septic Shock)  Goal: Blood Glucose Level Within Desired Range  Outcome: Ongoing, Progressing     Problem: Hemodynamic Instability (Sepsis/Septic Shock)  Goal: Effective Tissue Perfusion  Outcome: Ongoing, Progressing     Problem: Infection (Sepsis/Septic Shock)  Goal: Absence of Infection Signs/Symptoms  Outcome: Ongoing, Progressing     Problem: Nutrition Impaired (Sepsis/Septic Shock)  Goal: Optimal Nutrition Intake  Outcome: Ongoing, Progressing     Problem: Respiratory Compromise (Sepsis/Septic Shock)  Goal: Effective Oxygenation and Ventilation  Outcome: Ongoing, Progressing     Problem: Electrolyte Imbalance (Acute Kidney Injury/Impairment)  Goal: Serum Electrolyte Balance  Outcome: Ongoing, Progressing     Problem: Fluid Imbalance (Acute Kidney Injury/Impairment)  Goal: Optimal Fluid Balance  Outcome: Ongoing, Progressing     Problem: Hematologic Alteration (Acute Kidney Injury/Impairment)  Goal: Hemoglobin, Hematocrit and Platelets Within Normal Range  Outcome: Ongoing,  Progressing     Problem: Oral Intake Inadequate (Acute Kidney Injury/Impairment)  Goal: Optimal Nutrition Intake  Outcome: Ongoing, Progressing     Problem: Infection (Pneumonia)  Goal: Resolution of Infection Signs/Symptoms  Outcome: Ongoing, Progressing     Problem: Respiratory Compromise (Pneumonia)  Goal: Effective Oxygenation and Ventilation  Outcome: Ongoing, Progressing

## 2020-12-11 VITALS
HEIGHT: 71 IN | DIASTOLIC BLOOD PRESSURE: 85 MMHG | BODY MASS INDEX: 30.86 KG/M2 | SYSTOLIC BLOOD PRESSURE: 173 MMHG | WEIGHT: 220.44 LBS | OXYGEN SATURATION: 98 % | RESPIRATION RATE: 16 BRPM | TEMPERATURE: 97 F | HEART RATE: 73 BPM

## 2020-12-11 LAB
ANION GAP SERPL CALC-SCNC: 10 MMOL/L (ref 8–16)
BUN SERPL-MCNC: 25 MG/DL (ref 8–23)
CALCIUM SERPL-MCNC: 9.2 MG/DL (ref 8.7–10.5)
CHLORIDE SERPL-SCNC: 100 MMOL/L (ref 95–110)
CO2 SERPL-SCNC: 34 MMOL/L (ref 23–29)
CREAT SERPL-MCNC: 1.4 MG/DL (ref 0.5–1.4)
EST. GFR  (AFRICAN AMERICAN): 58 ML/MIN/1.73 M^2
EST. GFR  (NON AFRICAN AMERICAN): 51 ML/MIN/1.73 M^2
GLUCOSE SERPL-MCNC: 163 MG/DL (ref 70–110)
MAGNESIUM SERPL-MCNC: 1.5 MG/DL (ref 1.6–2.6)
POCT GLUCOSE: 172 MG/DL (ref 70–110)
POCT GLUCOSE: 279 MG/DL (ref 70–110)
POTASSIUM SERPL-SCNC: 3.3 MMOL/L (ref 3.5–5.1)
SODIUM SERPL-SCNC: 144 MMOL/L (ref 136–145)

## 2020-12-11 PROCEDURE — 94761 N-INVAS EAR/PLS OXIMETRY MLT: CPT

## 2020-12-11 PROCEDURE — 94640 AIRWAY INHALATION TREATMENT: CPT

## 2020-12-11 PROCEDURE — 80048 BASIC METABOLIC PNL TOTAL CA: CPT

## 2020-12-11 PROCEDURE — 83735 ASSAY OF MAGNESIUM: CPT

## 2020-12-11 PROCEDURE — 25000003 PHARM REV CODE 250: Performed by: INTERNAL MEDICINE

## 2020-12-11 PROCEDURE — 25000003 PHARM REV CODE 250: Performed by: HOSPITALIST

## 2020-12-11 PROCEDURE — 63600175 PHARM REV CODE 636 W HCPCS: Performed by: HOSPITALIST

## 2020-12-11 PROCEDURE — 25000242 PHARM REV CODE 250 ALT 637 W/ HCPCS: Performed by: INTERNAL MEDICINE

## 2020-12-11 PROCEDURE — 36415 COLL VENOUS BLD VENIPUNCTURE: CPT

## 2020-12-11 RX ORDER — SODIUM,POTASSIUM PHOSPHATES 280-250MG
2 POWDER IN PACKET (EA) ORAL
Status: DISCONTINUED | OUTPATIENT
Start: 2020-12-11 | End: 2020-12-11 | Stop reason: HOSPADM

## 2020-12-11 RX ORDER — INSULIN ASPART 100 [IU]/ML
7 INJECTION, SOLUTION INTRAVENOUS; SUBCUTANEOUS 3 TIMES DAILY
Qty: 6.3 ML | Refills: 11 | Status: ON HOLD | OUTPATIENT
Start: 2020-12-11 | End: 2020-12-31 | Stop reason: SDUPTHER

## 2020-12-11 RX ORDER — GUAIFENESIN/DEXTROMETHORPHAN 100-10MG/5
10 SYRUP ORAL EVERY 4 HOURS PRN
Refills: 0 | COMMUNITY
Start: 2020-12-11 | End: 2020-12-21

## 2020-12-11 RX ORDER — CARVEDILOL 25 MG/1
25 TABLET ORAL 2 TIMES DAILY
Qty: 60 TABLET | Refills: 11 | Status: ON HOLD | OUTPATIENT
Start: 2020-12-11 | End: 2021-03-24 | Stop reason: HOSPADM

## 2020-12-11 RX ORDER — FUROSEMIDE 40 MG/1
40 TABLET ORAL DAILY
Qty: 30 TABLET | Refills: 11 | Status: ON HOLD | OUTPATIENT
Start: 2020-12-11 | End: 2021-03-24 | Stop reason: SDUPTHER

## 2020-12-11 RX ORDER — GUAIFENESIN/DEXTROMETHORPHAN 100-10MG/5
10 SYRUP ORAL EVERY 4 HOURS PRN
Status: DISCONTINUED | OUTPATIENT
Start: 2020-12-11 | End: 2020-12-11 | Stop reason: HOSPADM

## 2020-12-11 RX ORDER — LANOLIN ALCOHOL/MO/W.PET/CERES
800 CREAM (GRAM) TOPICAL
Status: DISCONTINUED | OUTPATIENT
Start: 2020-12-11 | End: 2020-12-11 | Stop reason: HOSPADM

## 2020-12-11 RX ORDER — LOPERAMIDE HYDROCHLORIDE 2 MG/1
2 CAPSULE ORAL 4 TIMES DAILY PRN
Status: DISCONTINUED | OUTPATIENT
Start: 2020-12-11 | End: 2020-12-11 | Stop reason: HOSPADM

## 2020-12-11 RX ADMIN — INSULIN ASPART 7 UNITS: 100 INJECTION, SOLUTION INTRAVENOUS; SUBCUTANEOUS at 10:12

## 2020-12-11 RX ADMIN — APIXABAN 5 MG: 5 TABLET, FILM COATED ORAL at 10:12

## 2020-12-11 RX ADMIN — LEVALBUTEROL HYDROCHLORIDE 0.63 MG: 0.63 SOLUTION RESPIRATORY (INHALATION) at 03:12

## 2020-12-11 RX ADMIN — LEVALBUTEROL HYDROCHLORIDE 0.63 MG: 0.63 SOLUTION RESPIRATORY (INHALATION) at 08:12

## 2020-12-11 RX ADMIN — FUROSEMIDE 60 MG: 10 INJECTION, SOLUTION INTRAMUSCULAR; INTRAVENOUS at 10:12

## 2020-12-11 RX ADMIN — THERA TABS 1 TABLET: TAB at 10:12

## 2020-12-11 RX ADMIN — INSULIN ASPART 7 UNITS: 100 INJECTION, SOLUTION INTRAVENOUS; SUBCUTANEOUS at 04:12

## 2020-12-11 RX ADMIN — POTASSIUM BICARBONATE 50 MEQ: 977.5 TABLET, EFFERVESCENT ORAL at 11:12

## 2020-12-11 RX ADMIN — MAGNESIUM OXIDE TAB 400 MG (241.3 MG ELEMENTAL MG) 400 MG: 400 (241.3 MG) TAB at 10:12

## 2020-12-11 RX ADMIN — MAGNESIUM OXIDE TAB 400 MG (241.3 MG ELEMENTAL MG) 800 MG: 400 (241.3 MG) TAB at 11:12

## 2020-12-11 RX ADMIN — LOPERAMIDE HYDROCHLORIDE 2 MG: 2 CAPSULE ORAL at 03:12

## 2020-12-11 RX ADMIN — CARVEDILOL 25 MG: 12.5 TABLET, FILM COATED ORAL at 10:12

## 2020-12-11 RX ADMIN — LEVALBUTEROL HYDROCHLORIDE 0.63 MG: 0.63 SOLUTION RESPIRATORY (INHALATION) at 12:12

## 2020-12-11 NOTE — PROGRESS NOTES
WRITTEN HEALTHCARE DISCHARGE INFORMATION      Things that YOU are RESPONSIBLE for to Manage Your Care At Home:     1. Getting your prescriptions filled.  2. Taking you medications as directed. DO NOT MISS ANY DOSES!  3. Going to your follow-up doctor appointments. This is important because it allows the doctor to monitor your progress and to determine if any changes need to be made to your treatment plan.     If you are unable to make your follow up appointments, please call the number listed and reschedule this appointment.      ____________HELP AT HOME____________________     Experiencing any SIGNS or SYMPTOMS: YOU CAN     Schedule a same day appopintment with your Primary Care Doctor or  you can call [facility] On Call Nurse Care Line for 24/7 assistance at [phone]     If you are experience any signs or symptoms that have become severe, Call 911 and come to your nearest Emergency Room.     Thank you for choosing [facility] and allowing us to care for you.   From your care management team:      You should receive a call from [facility] Discharge Department within 48-72 hours to help manage your care after discharge. Please try to make sure that you answer your phone for this important phone call.    Follow-up Information     Joshua Llamas MD. Go on 12/10/2020.    Specialties: Cardiovascular Disease, INTERVENTIONAL CARDIOLOGY, Cardiology  Why: @ 9:00am Out-Patient Cardiology Hospital Follow-Up with Dr. Llamas  Contact information:  [address]  [address]  [facility] [address]  [phone]             [facility] On 12/14/2020.    Why: Appointment scheduled for Decemeber 14th at 4:10pm  Contact information:  [address] [facility] [address]  [phone]

## 2020-12-11 NOTE — DISCHARGE INSTRUCTIONS
Thank you for choosing [facility] for your medical care. The primary doctor who is taking care of you at the time of your discharge is Tico Cheney MD.     You were admitted to the hospital with Acute hypoxemic respiratory failure.     Please note your discharge instructions, including diet/activity restrictions, follow-up appointments, and medication changes.  If you have any questions about your medical issues, prescriptions, or any other questions, please feel free to contact the [facility] Medicine Dept at [phone] and we will help.    If you are previously with Home health, outpatient PT/OT or under a therapy program, you are cleared to return to those programs.    Please direct all long term medication refills and follow up to your primary care provider, Irlanda Valenzuela. Thank you again for letting us take care of your health care needs.    CHF Home Care-  Please make sure that you review the patient handouts for CHF. Remember that this disease is dependent on YOU! Proper diet, knowing when to increase your fluid pills, and knowing when to call your provider is absolutely essential to staying healthy! Please see below for how to change your fluid pills when you have weight gain.    Please make sure to weigh yourself EVERY DAY. See below if you gain weight.    Weight gain protocol  For weight gain of greater than 5 pounds over 2-3 days, or 10pounds over one week.   Increase lasix (oral diuretic) dose to 40mg 2xday for 5 days.  Notify physician.    If you are increasing your fluid pill, please notify the hospital medicine department [phone] to order labs for you kidney function within 2-3 days.    Please notify your PCP, Irlanda Valenzuela if your symptoms are worsening.

## 2020-12-11 NOTE — PROGRESS NOTES
HOW TO MANAGE YOUR CARE  AT HOME:  TN taught Symptoms and Problems for CHF home care with pt and  with teach back:  1. Low salt, 2. Feet swelling, 3. SOB. TN placed education sheet in [employer]..     HELP AT HOME TO ASSIST WITH PATIENT'S RECOVERY IS hs sister, Dulce.      TN taught patient about things he is responsible for when discharged TO HELP WITH HIS RECOVERY:  How to Manage His Care At Home:  1. Getting his prescriptions filled.  2. Taking his medications as directed. DO NOT MISS ANY DOSES!  3. Going to his follow-up doctor appointments.   .  Luh Avila, RN, BSN, STN CCM

## 2020-12-11 NOTE — PLAN OF CARE
Problem: Fall Injury Risk  Goal: Absence of Fall and Fall-Related Injury  Outcome: Ongoing, Progressing     Problem: Adult Inpatient Plan of Care  Goal: Plan of Care Review  Outcome: Ongoing, Progressing  Goal: Patient-Specific Goal (Individualization)  Outcome: Ongoing, Progressing  Goal: Absence of Hospital-Acquired Illness or Injury  Outcome: Ongoing, Progressing  Goal: Optimal Comfort and Wellbeing  Outcome: Ongoing, Progressing  Goal: Readiness for Transition of Care  Outcome: Ongoing, Progressing  Goal: Rounds/Family Conference  Outcome: Ongoing, Progressing     Problem: Diabetes Comorbidity  Goal: Blood Glucose Level Within Desired Range  Outcome: Ongoing, Progressing     Problem: Adjustment to Illness (Sepsis/Septic Shock)  Goal: Optimal Coping  Outcome: Ongoing, Progressing     Problem: Bleeding (Sepsis/Septic Shock)  Goal: Absence of Bleeding  Outcome: Ongoing, Progressing     Problem: Glycemic Control Impaired (Sepsis/Septic Shock)  Goal: Blood Glucose Level Within Desired Range  Outcome: Ongoing, Progressing     Problem: Hemodynamic Instability (Sepsis/Septic Shock)  Goal: Effective Tissue Perfusion  Outcome: Ongoing, Progressing     Problem: Infection (Sepsis/Septic Shock)  Goal: Absence of Infection Signs/Symptoms  Outcome: Ongoing, Progressing     Problem: Nutrition Impaired (Sepsis/Septic Shock)  Goal: Optimal Nutrition Intake  Outcome: Ongoing, Progressing     Problem: Respiratory Compromise (Sepsis/Septic Shock)  Goal: Effective Oxygenation and Ventilation  Outcome: Ongoing, Progressing     Problem: Electrolyte Imbalance (Acute Kidney Injury/Impairment)  Goal: Serum Electrolyte Balance  Outcome: Ongoing, Progressing     Problem: Fluid Imbalance (Acute Kidney Injury/Impairment)  Goal: Optimal Fluid Balance  Outcome: Ongoing, Progressing     Problem: Hematologic Alteration (Acute Kidney Injury/Impairment)  Goal: Hemoglobin, Hematocrit and Platelets Within Normal Range  Outcome: Ongoing,  Progressing     Problem: Oral Intake Inadequate (Acute Kidney Injury/Impairment)  Goal: Optimal Nutrition Intake  Outcome: Ongoing, Progressing     Problem: Infection (Pneumonia)  Goal: Resolution of Infection Signs/Symptoms  Outcome: Ongoing, Progressing     Problem: Respiratory Compromise (Pneumonia)  Goal: Effective Oxygenation and Ventilation  Outcome: Ongoing, Progressing     Patient awake and alert. Able to voice needs. 1L O2 in use nasal cannula. Tolerates medication by mouth. No c/o pain or discomfort noted. Ambulates independently. Blood glucose monitored. Will continue to monitor.

## 2020-12-11 NOTE — PROGRESS NOTES

## 2020-12-13 NOTE — DISCHARGE SUMMARY
[facility] Hospital Medicine  Discharge Summary      Patient Name: Chato Bowie  MRN: [id number]  Admission Date: 12/4/2020  Hospital Length of Stay: 6 days  Discharge Date and Time: 12/11/2020  5:24 PM  Attending Physician: No att. providers found   Discharging Provider: Tico Cheney MD  Primary Care Provider: [facility]      HPI:   Mr. Marie is a 70-year-old male with a past medical history of coronary artery disease, hypertension, diabetes, BPH, alcohol use who presents after he has been having a cough for 1 week.  The patient was recently admitted for chest pain and underwent nuclear stress test.  She had a fixed defect the patient was discharged with follow-up.  The patient states that ever since he was discharged, he has been having a dry cough and it has gotten so bad that it makes his chest hurt.  He is not producing any sputum.  Otherwise, he does not have chest pain on exertion.  Only when he coughs.  He denies fevers or chills but endorses shortness of breath with exertion in anything that he does.  He is not able to lie flat.  His lower extremities do have some swelling he states that this is his baseline and not worse than usual.  He denies smoking and denies any history of lung disease.  However he does believe that he may have some sort of asthma. The patient states he has not had any alcohol since he was discharged last week.    Procedure(s) (LRB):  Transesophageal echo (MARIA) intra-procedure log documentation (N/A)  Cardioversion or Defibrillation (N/A)      Hospital Course:   Admitted for chest pain, cough and hypoxemic respiratory failure. Found to be afib/flutter and concerned for pneumonia and volume overload. Started on antibiotics and IV diuresis. Cardiology consulted and MARIA/cardioversion on 12/7. TTE no pulmonary hypertension, normal EF and diastolic function. Still hypoxic. Started doxycycline, prednisone, and PO lasix.  Patient completed a course of oral  doxycycline and prednisone for 5 days and this was discontinued at time of discharge.  He did have some increased hypoxemia on the day prior to discharge, which seemed to be related to worsening heart failure.  He received 60 mg of Lasix IV and diuresed well.  Home oxygen evaluation was done on the day of discharge, and he did not require any oxygen.     Consults:   Consults (From admission, onward)        Status Ordering Provider     Inpatient consult to Cardiology  Once     Provider:  Indra Foote MD    Completed ALEAH DEE     Inpatient consult to Social Work  Once     Provider:  (Not yet assigned)    INDRA Nobles     Inpatient virtual consult to Hospital Medicine  Once     Provider:  Tico Cheney MD    Completed ROCK DE LA ROSA new Assessment & Plan notes have been filed under this hospital service since the last note was generated.  Service: Hospital Medicine    Final Active Diagnoses:    Diagnosis Date Noted POA    PRINCIPAL PROBLEM:  Acute hypoxemic respiratory failure [J96.01] 12/05/2020 Yes    Atrial flutter [I48.92] 12/07/2020 Yes    Pneumonia of right lower lobe due to infectious organism [J18.9] 12/05/2020 Yes    Other chest pain [R07.89] 12/05/2020 Yes    CKD (chronic kidney disease), stage III [N18.30] 11/24/2020 Yes    Aortic stenosis, moderate [I35.0] 11/24/2020 Yes    Hypomagnesemia [E83.42] 11/24/2020 Yes    Acute on chronic diastolic congestive heart failure [I50.33] 03/20/2020 Yes    Hypokalemia [E87.6] 02/14/2019 Yes    Alcohol abuse [F10.10] 02/14/2019 Yes    Hyperlipidemia [E78.5] 03/10/2017 Yes     Chronic    Essential hypertension [I10] 03/10/2017 Yes     Chronic    Type 2 diabetes mellitus, uncontrolled [E11.65] 03/10/2017 Yes     Chronic    Anemia of chronic disease [D63.8] 03/10/2017 Yes     Chronic    GERD (gastroesophageal reflux disease) [K21.9] 03/10/2017 Yes     Chronic    CAD (coronary artery disease) [I25.10]  03/08/2016 Yes     Chronic    Paroxysmal atrial fibrillation [I48.0] 03/07/2016 Yes      Problems Resolved During this Admission:    Diagnosis Date Noted Date Resolved POA    CHF (congestive heart failure) [I50.9] 12/05/2020 12/05/2020 Yes       Discharged Condition: stable    Disposition: Home or Self Care    Follow Up:  Follow-up Information     Joshua Llamas MD. Go on 12/10/2020.    Specialties: Cardiovascular Disease, INTERVENTIONAL CARDIOLOGY, Cardiology  Why: @ 9:00am Out-Patient Cardiology Hospital Follow-Up with Dr. Llamas  Contact information:  [address]  [address]  [facility] [address]  [phone]             [facility] On 12/14/2020.    Why: Appointment scheduled for Decemeber 14th at 4:10pm  Contact information:  [address] [place] [address]  [phone]                 Patient Instructions:      Diet diabetic     Diet Cardiac     Notify your health care provider if you experience any of the following:  difficulty breathing or increased cough     Notify your health care provider if you experience any of the following:  severe uncontrolled pain     Activity as tolerated       Significant Diagnostic Studies:  N/A    Pending Diagnostic Studies:     None         Medications:  Reconciled Home Medications:      Medication List      START taking these medications    apixaban 5 mg Tab  Commonly known as: ELIQUIS  Take 1 tablet (5 mg total) by mouth 2 (two) times daily.     dextromethorphan-guaifenesin  mg/5 ml  mg/5 mL liquid  Commonly known as: ROBITUSSIN-DM  Take 10 mLs by mouth every 4 (four) hours as needed.     insulin aspart U-100 100 unit/mL (3 mL) Inpn pen  Commonly known as: NovoLOG  Inject 7 Units into the skin 3 (three) times daily.     insulin detemir U-100 100 unit/mL (3 mL) Inpn pen  Commonly known as: LEVEMIR FLEXTOUCH  Inject 20 Units into the skin once daily.        CHANGE how you take these medications    carvediloL 25 MG tablet  Commonly known as: COREG  Take  1 tablet (25 mg total) by mouth 2 (two) times daily.  What changed:   · medication strength  · how much to take        CONTINUE taking these medications    atorvastatin 80 MG tablet  Commonly known as: LIPITOR  Take 1 tablet (80 mg total) by mouth every evening.     DurezoL 0.05 % Drop ophthalmic solution  Generic drug: difluprednate  INT 1 GTT INTO OD FID FOR 3 WEEKS     furosemide 40 MG tablet  Commonly known as: LASIX  Take 1 tablet (40 mg total) by mouth once daily.     magnesium oxide 400 mg (241.3 mg magnesium) tablet  Commonly known as: MAG-OX  Take 800 mg by mouth.     multivitamin Tab  Take 1 tablet by mouth once daily.     sildenafiL 100 MG tablet  Commonly known as: VIAGRA  Take 1 tablet (100 mg total) by mouth daily as needed for Erectile Dysfunction. *generic tabs*     tamsulosin 0.4 mg Cap  Commonly known as: FLOMAX  Take 1 capsule (0.4 mg total) by mouth once daily.        STOP taking these medications    amLODIPine 10 MG tablet  Commonly known as: NORVASC     aspirin 81 MG EC tablet  Commonly known as: ECOTRIN     aspirin-calcium carbonate 81 mg-300 mg calcium(777 mg) Tab     atropine 1% 1 % Drop  Commonly known as: ISOPTO ATROPINE     colchicine 0.6 mg tablet  Commonly known as: COLCRYS     fenofibrate 160 MG Tab     folic acid 1 MG tablet  Commonly known as: FOLVITE     hydrALAZINE 50 MG tablet  Commonly known as: APRESOLINE     insulin syringe-needle U-100 1 mL 31 gauge x 5/16 Syrg     lansoprazole 30 MG capsule  Commonly known as: PREVACID     NovoLOG Mix 70-30 U-100 Insuln 100 unit/mL (70-30) Soln  Generic drug: insulin asp prt-insulin aspart (NOVOLOG 70/30)     pantoprazole 40 MG tablet  Commonly known as: PROTONIX     rosuvastatin 40 MG Tab  Commonly known as: CRESTOR     terbinafine  mg tablet  Commonly known as: LAMISIL     thiamine 100 MG tablet            Indwelling Lines/Drains at time of discharge:   Lines/Drains/Airways     Airway                 Airway - Non-Surgical 12/07/20  1427 Nasal Cannula 5 days                Time spent on the discharge of patient: 38 minutes  Patient was seen and examined on the date of discharge and determined to be suitable for discharge.         Tico Cheney MD  Department of Hospital Medicine  [facility]

## 2020-12-29 ENCOUNTER — HOSPITAL ENCOUNTER (INPATIENT)
Facility: HOSPITAL | Age: 70
LOS: 2 days | Discharge: HOME | DRG: 308 | End: 2020-12-31
Attending: EMERGENCY MEDICINE | Admitting: INTERNAL MEDICINE
Payer: MEDICARE

## 2020-12-29 DIAGNOSIS — R09.02 HYPOXIA: ICD-10-CM

## 2020-12-29 DIAGNOSIS — R06.02 SHORTNESS OF BREATH: ICD-10-CM

## 2020-12-29 DIAGNOSIS — I50.9 CONGESTIVE HEART FAILURE, UNSPECIFIED HF CHRONICITY, UNSPECIFIED HEART FAILURE TYPE: ICD-10-CM

## 2020-12-29 DIAGNOSIS — J81.0 ACUTE PULMONARY EDEMA: ICD-10-CM

## 2020-12-29 DIAGNOSIS — I48.0 PAROXYSMAL ATRIAL FIBRILLATION: ICD-10-CM

## 2020-12-29 DIAGNOSIS — R60.0 BILATERAL LOWER EXTREMITY EDEMA: ICD-10-CM

## 2020-12-29 DIAGNOSIS — I48.92 ATRIAL FLUTTER WITH RAPID VENTRICULAR RESPONSE: Primary | ICD-10-CM

## 2020-12-29 PROBLEM — R07.9 CHEST PAIN: Status: ACTIVE | Noted: 2020-12-05

## 2020-12-29 PROBLEM — R79.89 ELEVATED SERUM CREATININE: Status: ACTIVE | Noted: 2020-12-29

## 2020-12-29 LAB
ALBUMIN SERPL BCP-MCNC: 3.5 G/DL (ref 3.5–5.2)
ALP SERPL-CCNC: 64 U/L (ref 55–135)
ALT SERPL W/O P-5'-P-CCNC: 19 U/L (ref 10–44)
ANION GAP SERPL CALC-SCNC: 15 MMOL/L (ref 8–16)
APTT BLDCRRT: 25.6 SEC (ref 21–32)
AST SERPL-CCNC: 24 U/L (ref 10–40)
BASOPHILS # BLD AUTO: 0.07 K/UL (ref 0–0.2)
BASOPHILS NFR BLD: 1 % (ref 0–1.9)
BILIRUB SERPL-MCNC: 0.7 MG/DL (ref 0.1–1)
BNP SERPL-MCNC: 887 PG/ML (ref 0–99)
BUN SERPL-MCNC: 18 MG/DL (ref 8–23)
CALCIUM SERPL-MCNC: 9 MG/DL (ref 8.7–10.5)
CHLORIDE SERPL-SCNC: 99 MMOL/L (ref 95–110)
CHOLEST SERPL-MCNC: 141 MG/DL (ref 120–199)
CHOLEST/HDLC SERPL: 5.2 {RATIO} (ref 2–5)
CO2 SERPL-SCNC: 25 MMOL/L (ref 23–29)
CREAT SERPL-MCNC: 1.6 MG/DL (ref 0.5–1.4)
CTP QC/QA: YES
DIFFERENTIAL METHOD: ABNORMAL
EOSINOPHIL # BLD AUTO: 0.3 K/UL (ref 0–0.5)
EOSINOPHIL NFR BLD: 4.5 % (ref 0–8)
ERYTHROCYTE [DISTWIDTH] IN BLOOD BY AUTOMATED COUNT: 14 % (ref 11.5–14.5)
EST. GFR  (AFRICAN AMERICAN): 50 ML/MIN/1.73 M^2
EST. GFR  (NON AFRICAN AMERICAN): 43 ML/MIN/1.73 M^2
ESTIMATED AVG GLUCOSE: 209 MG/DL (ref 68–131)
GLUCOSE SERPL-MCNC: 222 MG/DL (ref 70–110)
HBA1C MFR BLD HPLC: 8.9 % (ref 4–5.6)
HCT VFR BLD AUTO: 32.5 % (ref 40–54)
HDLC SERPL-MCNC: 27 MG/DL (ref 40–75)
HDLC SERPL: 19.1 % (ref 20–50)
HGB BLD-MCNC: 11 G/DL (ref 14–18)
IMM GRANULOCYTES # BLD AUTO: 0.03 K/UL (ref 0–0.04)
IMM GRANULOCYTES NFR BLD AUTO: 0.4 % (ref 0–0.5)
INR PPP: 1 (ref 0.8–1.2)
LDLC SERPL CALC-MCNC: 89.4 MG/DL (ref 63–159)
LYMPHOCYTES # BLD AUTO: 1 K/UL (ref 1–4.8)
LYMPHOCYTES NFR BLD: 14 % (ref 18–48)
MAGNESIUM SERPL-MCNC: 1.7 MG/DL (ref 1.6–2.6)
MCH RBC QN AUTO: 28.1 PG (ref 27–31)
MCHC RBC AUTO-ENTMCNC: 33.8 G/DL (ref 32–36)
MCV RBC AUTO: 83 FL (ref 82–98)
MONOCYTES # BLD AUTO: 0.8 K/UL (ref 0.3–1)
MONOCYTES NFR BLD: 12.3 % (ref 4–15)
NEUTROPHILS # BLD AUTO: 4.6 K/UL (ref 1.8–7.7)
NEUTROPHILS NFR BLD: 67.8 % (ref 38–73)
NONHDLC SERPL-MCNC: 114 MG/DL
NRBC BLD-RTO: 0 /100 WBC
PHOSPHATE SERPL-MCNC: 3.4 MG/DL (ref 2.7–4.5)
PLATELET # BLD AUTO: 268 K/UL (ref 150–350)
PMV BLD AUTO: 9.5 FL (ref 9.2–12.9)
POCT GLUCOSE: 184 MG/DL (ref 70–110)
POCT GLUCOSE: 225 MG/DL (ref 70–110)
POCT GLUCOSE: 240 MG/DL (ref 70–110)
POTASSIUM SERPL-SCNC: 3.5 MMOL/L (ref 3.5–5.1)
PROT SERPL-MCNC: 6.9 G/DL (ref 6–8.4)
PROTHROMBIN TIME: 11.5 SEC (ref 9–12.5)
RBC # BLD AUTO: 3.92 M/UL (ref 4.6–6.2)
SARS-COV-2 RDRP RESP QL NAA+PROBE: NEGATIVE
SODIUM SERPL-SCNC: 139 MMOL/L (ref 136–145)
TRIGL SERPL-MCNC: 123 MG/DL (ref 30–150)
TROPONIN I SERPL DL<=0.01 NG/ML-MCNC: 0.01 NG/ML (ref 0–0.03)
TSH SERPL DL<=0.005 MIU/L-ACNC: 2.63 UIU/ML (ref 0.4–4)
WBC # BLD AUTO: 6.85 K/UL (ref 3.9–12.7)

## 2020-12-29 PROCEDURE — 99291 CRITICAL CARE FIRST HOUR: CPT | Mod: ,,, | Performed by: INTERNAL MEDICINE

## 2020-12-29 PROCEDURE — 82962 GLUCOSE BLOOD TEST: CPT

## 2020-12-29 PROCEDURE — 93010 ELECTROCARDIOGRAM REPORT: CPT | Mod: ,,, | Performed by: INTERNAL MEDICINE

## 2020-12-29 PROCEDURE — 63600175 PHARM REV CODE 636 W HCPCS: Performed by: INTERNAL MEDICINE

## 2020-12-29 PROCEDURE — 80053 COMPREHEN METABOLIC PANEL: CPT

## 2020-12-29 PROCEDURE — 25000003 PHARM REV CODE 250: Performed by: HOSPITALIST

## 2020-12-29 PROCEDURE — 93010 EKG 12-LEAD: ICD-10-PCS | Mod: ,,, | Performed by: INTERNAL MEDICINE

## 2020-12-29 PROCEDURE — 25000003 PHARM REV CODE 250: Performed by: STUDENT IN AN ORGANIZED HEALTH CARE EDUCATION/TRAINING PROGRAM

## 2020-12-29 PROCEDURE — 27000221 HC OXYGEN, UP TO 24 HOURS

## 2020-12-29 PROCEDURE — 80061 LIPID PANEL: CPT

## 2020-12-29 PROCEDURE — 84484 ASSAY OF TROPONIN QUANT: CPT

## 2020-12-29 PROCEDURE — C9399 UNCLASSIFIED DRUGS OR BIOLOG: HCPCS | Performed by: STUDENT IN AN ORGANIZED HEALTH CARE EDUCATION/TRAINING PROGRAM

## 2020-12-29 PROCEDURE — 99291 PR CRITICAL CARE, E/M 30-74 MINUTES: ICD-10-PCS | Mod: ,,, | Performed by: INTERNAL MEDICINE

## 2020-12-29 PROCEDURE — 20000000 HC ICU ROOM

## 2020-12-29 PROCEDURE — 85610 PROTHROMBIN TIME: CPT

## 2020-12-29 PROCEDURE — 85730 THROMBOPLASTIN TIME PARTIAL: CPT

## 2020-12-29 PROCEDURE — 94761 N-INVAS EAR/PLS OXIMETRY MLT: CPT

## 2020-12-29 PROCEDURE — 27000190 HC CPAP FULL FACE MASK W/VALVE

## 2020-12-29 PROCEDURE — U0002 COVID-19 LAB TEST NON-CDC: HCPCS | Performed by: EMERGENCY MEDICINE

## 2020-12-29 PROCEDURE — 25000003 PHARM REV CODE 250: Performed by: EMERGENCY MEDICINE

## 2020-12-29 PROCEDURE — 99285 EMERGENCY DEPT VISIT HI MDM: CPT | Mod: 25

## 2020-12-29 PROCEDURE — 63600175 PHARM REV CODE 636 W HCPCS: Performed by: STUDENT IN AN ORGANIZED HEALTH CARE EDUCATION/TRAINING PROGRAM

## 2020-12-29 PROCEDURE — 83880 ASSAY OF NATRIURETIC PEPTIDE: CPT

## 2020-12-29 PROCEDURE — 99900035 HC TECH TIME PER 15 MIN (STAT)

## 2020-12-29 PROCEDURE — 85025 COMPLETE CBC W/AUTO DIFF WBC: CPT

## 2020-12-29 PROCEDURE — 94660 CPAP INITIATION&MGMT: CPT

## 2020-12-29 PROCEDURE — 84443 ASSAY THYROID STIM HORMONE: CPT

## 2020-12-29 PROCEDURE — 97802 MEDICAL NUTRITION INDIV IN: CPT

## 2020-12-29 PROCEDURE — 63600175 PHARM REV CODE 636 W HCPCS: Performed by: EMERGENCY MEDICINE

## 2020-12-29 PROCEDURE — 96374 THER/PROPH/DIAG INJ IV PUSH: CPT

## 2020-12-29 PROCEDURE — 84100 ASSAY OF PHOSPHORUS: CPT

## 2020-12-29 PROCEDURE — 96375 TX/PRO/DX INJ NEW DRUG ADDON: CPT

## 2020-12-29 PROCEDURE — 93005 ELECTROCARDIOGRAM TRACING: CPT

## 2020-12-29 PROCEDURE — 83036 HEMOGLOBIN GLYCOSYLATED A1C: CPT

## 2020-12-29 PROCEDURE — 83735 ASSAY OF MAGNESIUM: CPT

## 2020-12-29 RX ORDER — TAMSULOSIN HYDROCHLORIDE 0.4 MG/1
0.4 CAPSULE ORAL DAILY
Status: DISCONTINUED | OUTPATIENT
Start: 2020-12-29 | End: 2020-12-31 | Stop reason: HOSPADM

## 2020-12-29 RX ORDER — ATORVASTATIN CALCIUM 40 MG/1
80 TABLET, FILM COATED ORAL NIGHTLY
Status: DISCONTINUED | OUTPATIENT
Start: 2020-12-29 | End: 2020-12-31 | Stop reason: HOSPADM

## 2020-12-29 RX ORDER — POTASSIUM CHLORIDE 20 MEQ/1
40 TABLET, EXTENDED RELEASE ORAL ONCE
Status: COMPLETED | OUTPATIENT
Start: 2020-12-29 | End: 2020-12-29

## 2020-12-29 RX ORDER — MUPIROCIN 20 MG/G
OINTMENT TOPICAL 2 TIMES DAILY
Status: DISCONTINUED | OUTPATIENT
Start: 2020-12-29 | End: 2020-12-31 | Stop reason: HOSPADM

## 2020-12-29 RX ORDER — METOPROLOL TARTRATE 1 MG/ML
5 INJECTION, SOLUTION INTRAVENOUS
Status: COMPLETED | OUTPATIENT
Start: 2020-12-29 | End: 2020-12-29

## 2020-12-29 RX ORDER — ONDANSETRON 2 MG/ML
4 INJECTION INTRAMUSCULAR; INTRAVENOUS EVERY 8 HOURS PRN
Status: DISCONTINUED | OUTPATIENT
Start: 2020-12-29 | End: 2020-12-31 | Stop reason: HOSPADM

## 2020-12-29 RX ORDER — SODIUM CHLORIDE 0.9 % (FLUSH) 0.9 %
10 SYRINGE (ML) INJECTION
Status: DISCONTINUED | OUTPATIENT
Start: 2020-12-29 | End: 2020-12-31 | Stop reason: HOSPADM

## 2020-12-29 RX ORDER — ENOXAPARIN SODIUM 100 MG/ML
40 INJECTION SUBCUTANEOUS EVERY 24 HOURS
Status: DISCONTINUED | OUTPATIENT
Start: 2020-12-29 | End: 2020-12-29

## 2020-12-29 RX ORDER — INSULIN ASPART 100 [IU]/ML
4 INJECTION, SOLUTION INTRAVENOUS; SUBCUTANEOUS
Status: DISCONTINUED | OUTPATIENT
Start: 2020-12-29 | End: 2020-12-31 | Stop reason: HOSPADM

## 2020-12-29 RX ORDER — FUROSEMIDE 10 MG/ML
40 INJECTION INTRAMUSCULAR; INTRAVENOUS
Status: DISCONTINUED | OUTPATIENT
Start: 2020-12-29 | End: 2020-12-30

## 2020-12-29 RX ORDER — FUROSEMIDE 10 MG/ML
80 INJECTION INTRAMUSCULAR; INTRAVENOUS
Status: COMPLETED | OUTPATIENT
Start: 2020-12-29 | End: 2020-12-29

## 2020-12-29 RX ADMIN — MUPIROCIN: 20 OINTMENT TOPICAL at 11:12

## 2020-12-29 RX ADMIN — AMIODARONE HYDROCHLORIDE 1 MG/MIN: 1.8 INJECTION, SOLUTION INTRAVENOUS at 11:12

## 2020-12-29 RX ADMIN — INSULIN ASPART 4 UNITS: 100 INJECTION, SOLUTION INTRAVENOUS; SUBCUTANEOUS at 12:12

## 2020-12-29 RX ADMIN — FUROSEMIDE 80 MG: 10 INJECTION, SOLUTION INTRAMUSCULAR; INTRAVENOUS at 03:12

## 2020-12-29 RX ADMIN — POTASSIUM CHLORIDE 40 MEQ: 1500 TABLET, EXTENDED RELEASE ORAL at 11:12

## 2020-12-29 RX ADMIN — APIXABAN 5 MG: 5 TABLET, FILM COATED ORAL at 11:12

## 2020-12-29 RX ADMIN — APIXABAN 5 MG: 5 TABLET, FILM COATED ORAL at 09:12

## 2020-12-29 RX ADMIN — AMIODARONE HYDROCHLORIDE 150 MG: 1.5 INJECTION, SOLUTION INTRAVENOUS at 11:12

## 2020-12-29 RX ADMIN — ATORVASTATIN CALCIUM 80 MG: 40 TABLET, FILM COATED ORAL at 09:12

## 2020-12-29 RX ADMIN — FUROSEMIDE 40 MG: 10 INJECTION, SOLUTION INTRAVENOUS at 10:12

## 2020-12-29 RX ADMIN — AMIODARONE HYDROCHLORIDE 0.5 MG/MIN: 1.8 INJECTION, SOLUTION INTRAVENOUS at 04:12

## 2020-12-29 RX ADMIN — TAMSULOSIN HYDROCHLORIDE 0.4 MG: 0.4 CAPSULE ORAL at 11:12

## 2020-12-29 RX ADMIN — MUPIROCIN: 20 OINTMENT TOPICAL at 09:12

## 2020-12-29 RX ADMIN — METOPROLOL TARTRATE 5 MG: 5 INJECTION, SOLUTION INTRAVENOUS at 05:12

## 2020-12-29 RX ADMIN — FUROSEMIDE 40 MG: 10 INJECTION, SOLUTION INTRAVENOUS at 09:12

## 2020-12-29 RX ADMIN — INSULIN DETEMIR 10 UNITS: 100 INJECTION, SOLUTION SUBCUTANEOUS at 09:12

## 2020-12-30 ENCOUNTER — ANESTHESIA (OUTPATIENT)
Dept: CARDIOLOGY | Facility: HOSPITAL | Age: 70
DRG: 308 | End: 2020-12-30
Payer: MEDICARE

## 2020-12-30 ENCOUNTER — ANESTHESIA EVENT (OUTPATIENT)
Dept: CARDIOLOGY | Facility: HOSPITAL | Age: 70
DRG: 308 | End: 2020-12-30
Payer: MEDICARE

## 2020-12-30 LAB
ANION GAP SERPL CALC-SCNC: 11 MMOL/L (ref 8–16)
BUN SERPL-MCNC: 18 MG/DL (ref 8–23)
CALCIUM SERPL-MCNC: 9.1 MG/DL (ref 8.7–10.5)
CHLORIDE SERPL-SCNC: 102 MMOL/L (ref 95–110)
CO2 SERPL-SCNC: 29 MMOL/L (ref 23–29)
CREAT SERPL-MCNC: 1.6 MG/DL (ref 0.5–1.4)
EST. GFR  (AFRICAN AMERICAN): 50 ML/MIN/1.73 M^2
EST. GFR  (NON AFRICAN AMERICAN): 43 ML/MIN/1.73 M^2
GLUCOSE SERPL-MCNC: 174 MG/DL (ref 70–110)
POCT GLUCOSE: 164 MG/DL (ref 70–110)
POCT GLUCOSE: 173 MG/DL (ref 70–110)
POCT GLUCOSE: 188 MG/DL (ref 70–110)
POCT GLUCOSE: 197 MG/DL (ref 70–110)
POTASSIUM SERPL-SCNC: 3.1 MMOL/L (ref 3.5–5.1)
SODIUM SERPL-SCNC: 142 MMOL/L (ref 136–145)

## 2020-12-30 PROCEDURE — 80048 BASIC METABOLIC PNL TOTAL CA: CPT

## 2020-12-30 PROCEDURE — 25000003 PHARM REV CODE 250: Performed by: INTERNAL MEDICINE

## 2020-12-30 PROCEDURE — 92960 CARDIOVERSION ELECTRIC EXT: CPT | Mod: ,,, | Performed by: INTERNAL MEDICINE

## 2020-12-30 PROCEDURE — 92960 PR CARDIOVERSION, ELECTIVE;EXTERN: ICD-10-PCS | Mod: ,,, | Performed by: INTERNAL MEDICINE

## 2020-12-30 PROCEDURE — 25000242 PHARM REV CODE 250 ALT 637 W/ HCPCS: Performed by: STUDENT IN AN ORGANIZED HEALTH CARE EDUCATION/TRAINING PROGRAM

## 2020-12-30 PROCEDURE — 36415 COLL VENOUS BLD VENIPUNCTURE: CPT

## 2020-12-30 PROCEDURE — C1894 INTRO/SHEATH, NON-LASER: HCPCS | Performed by: INTERNAL MEDICINE

## 2020-12-30 PROCEDURE — 37000009 HC ANESTHESIA EA ADD 15 MINS: Performed by: INTERNAL MEDICINE

## 2020-12-30 PROCEDURE — 37000008 HC ANESTHESIA 1ST 15 MINUTES: Performed by: INTERNAL MEDICINE

## 2020-12-30 PROCEDURE — D9220A PRA ANESTHESIA: ICD-10-PCS | Mod: ANES,,, | Performed by: ANESTHESIOLOGY

## 2020-12-30 PROCEDURE — 25000003 PHARM REV CODE 250: Performed by: STUDENT IN AN ORGANIZED HEALTH CARE EDUCATION/TRAINING PROGRAM

## 2020-12-30 PROCEDURE — 93010 EKG 12-LEAD: ICD-10-PCS | Mod: ,,, | Performed by: INTERNAL MEDICINE

## 2020-12-30 PROCEDURE — C1887 CATHETER, GUIDING: HCPCS | Performed by: INTERNAL MEDICINE

## 2020-12-30 PROCEDURE — D9220A PRA ANESTHESIA: Mod: CRNA,,, | Performed by: NURSE ANESTHETIST, CERTIFIED REGISTERED

## 2020-12-30 PROCEDURE — D9220A PRA ANESTHESIA: Mod: ANES,,, | Performed by: ANESTHESIOLOGY

## 2020-12-30 PROCEDURE — 25000242 PHARM REV CODE 250 ALT 637 W/ HCPCS: Performed by: ANESTHESIOLOGY

## 2020-12-30 PROCEDURE — C1769 GUIDE WIRE: HCPCS | Performed by: INTERNAL MEDICINE

## 2020-12-30 PROCEDURE — 94640 AIRWAY INHALATION TREATMENT: CPT

## 2020-12-30 PROCEDURE — 25000003 PHARM REV CODE 250: Performed by: NURSE ANESTHETIST, CERTIFIED REGISTERED

## 2020-12-30 PROCEDURE — 63600175 PHARM REV CODE 636 W HCPCS: Performed by: STUDENT IN AN ORGANIZED HEALTH CARE EDUCATION/TRAINING PROGRAM

## 2020-12-30 PROCEDURE — 21400001 HC TELEMETRY ROOM

## 2020-12-30 PROCEDURE — 93010 ELECTROCARDIOGRAM REPORT: CPT | Mod: ,,, | Performed by: INTERNAL MEDICINE

## 2020-12-30 PROCEDURE — 92960 CARDIOVERSION ELECTRIC EXT: CPT | Performed by: INTERNAL MEDICINE

## 2020-12-30 PROCEDURE — 94761 N-INVAS EAR/PLS OXIMETRY MLT: CPT

## 2020-12-30 PROCEDURE — D9220A PRA ANESTHESIA: ICD-10-PCS | Mod: CRNA,,, | Performed by: NURSE ANESTHETIST, CERTIFIED REGISTERED

## 2020-12-30 PROCEDURE — 27000221 HC OXYGEN, UP TO 24 HOURS

## 2020-12-30 PROCEDURE — 93005 ELECTROCARDIOGRAM TRACING: CPT

## 2020-12-30 PROCEDURE — 63600175 PHARM REV CODE 636 W HCPCS: Performed by: NURSE ANESTHETIST, CERTIFIED REGISTERED

## 2020-12-30 RX ORDER — PROPOFOL 10 MG/ML
VIAL (ML) INTRAVENOUS
Status: DISCONTINUED | OUTPATIENT
Start: 2020-12-30 | End: 2020-12-30

## 2020-12-30 RX ORDER — POTASSIUM CHLORIDE 20 MEQ/1
40 TABLET, EXTENDED RELEASE ORAL ONCE
Status: COMPLETED | OUTPATIENT
Start: 2020-12-30 | End: 2020-12-30

## 2020-12-30 RX ORDER — IPRATROPIUM BROMIDE AND ALBUTEROL SULFATE 2.5; .5 MG/3ML; MG/3ML
3 SOLUTION RESPIRATORY (INHALATION)
Status: COMPLETED | OUTPATIENT
Start: 2020-12-30 | End: 2020-12-30

## 2020-12-30 RX ORDER — LABETALOL HYDROCHLORIDE 5 MG/ML
10 INJECTION, SOLUTION INTRAVENOUS EVERY 4 HOURS
Status: DISCONTINUED | OUTPATIENT
Start: 2020-12-30 | End: 2020-12-30

## 2020-12-30 RX ORDER — BENZONATATE 100 MG/1
100 CAPSULE ORAL 3 TIMES DAILY
Status: DISCONTINUED | OUTPATIENT
Start: 2020-12-30 | End: 2020-12-31 | Stop reason: HOSPADM

## 2020-12-30 RX ORDER — ALBUTEROL SULFATE 90 UG/1
2 AEROSOL, METERED RESPIRATORY (INHALATION) EVERY 6 HOURS PRN
Status: DISCONTINUED | OUTPATIENT
Start: 2020-12-30 | End: 2020-12-30

## 2020-12-30 RX ORDER — BENZONATATE 100 MG/1
100 CAPSULE ORAL 3 TIMES DAILY PRN
Status: DISCONTINUED | OUTPATIENT
Start: 2020-12-30 | End: 2020-12-30

## 2020-12-30 RX ORDER — AMIODARONE HYDROCHLORIDE 200 MG/1
200 TABLET ORAL 2 TIMES DAILY
Status: DISCONTINUED | OUTPATIENT
Start: 2020-12-30 | End: 2020-12-31 | Stop reason: HOSPADM

## 2020-12-30 RX ORDER — FUROSEMIDE 10 MG/ML
40 INJECTION INTRAMUSCULAR; INTRAVENOUS DAILY
Status: DISCONTINUED | OUTPATIENT
Start: 2020-12-31 | End: 2020-12-31

## 2020-12-30 RX ORDER — LIDOCAINE HCL/PF 100 MG/5ML
SYRINGE (ML) INTRAVENOUS
Status: DISCONTINUED | OUTPATIENT
Start: 2020-12-30 | End: 2020-12-30

## 2020-12-30 RX ORDER — IPRATROPIUM BROMIDE AND ALBUTEROL SULFATE 2.5; .5 MG/3ML; MG/3ML
3 SOLUTION RESPIRATORY (INHALATION) EVERY 4 HOURS PRN
Status: DISCONTINUED | OUTPATIENT
Start: 2020-12-30 | End: 2020-12-31 | Stop reason: HOSPADM

## 2020-12-30 RX ORDER — LABETALOL HYDROCHLORIDE 5 MG/ML
10 INJECTION, SOLUTION INTRAVENOUS EVERY 4 HOURS PRN
Status: DISCONTINUED | OUTPATIENT
Start: 2020-12-30 | End: 2020-12-31 | Stop reason: HOSPADM

## 2020-12-30 RX ADMIN — TAMSULOSIN HYDROCHLORIDE 0.4 MG: 0.4 CAPSULE ORAL at 08:12

## 2020-12-30 RX ADMIN — ALBUTEROL SULFATE 2 PUFF: 90 AEROSOL, METERED RESPIRATORY (INHALATION) at 08:12

## 2020-12-30 RX ADMIN — INSULIN DETEMIR 10 UNITS: 100 INJECTION, SOLUTION SUBCUTANEOUS at 09:12

## 2020-12-30 RX ADMIN — BENZONATATE 100 MG: 100 CAPSULE ORAL at 04:12

## 2020-12-30 RX ADMIN — MUPIROCIN: 20 OINTMENT TOPICAL at 09:12

## 2020-12-30 RX ADMIN — APIXABAN 5 MG: 5 TABLET, FILM COATED ORAL at 09:12

## 2020-12-30 RX ADMIN — BENZONATATE 100 MG: 100 CAPSULE ORAL at 09:12

## 2020-12-30 RX ADMIN — POTASSIUM CHLORIDE 40 MEQ: 1500 TABLET, EXTENDED RELEASE ORAL at 08:12

## 2020-12-30 RX ADMIN — MUPIROCIN: 20 OINTMENT TOPICAL at 08:12

## 2020-12-30 RX ADMIN — FUROSEMIDE 40 MG: 10 INJECTION, SOLUTION INTRAVENOUS at 11:12

## 2020-12-30 RX ADMIN — LIDOCAINE HYDROCHLORIDE 100 MG: 20 INJECTION, SOLUTION INTRAVENOUS at 02:12

## 2020-12-30 RX ADMIN — APIXABAN 5 MG: 5 TABLET, FILM COATED ORAL at 08:12

## 2020-12-30 RX ADMIN — POTASSIUM CHLORIDE 40 MEQ: 1500 TABLET, EXTENDED RELEASE ORAL at 04:12

## 2020-12-30 RX ADMIN — AMIODARONE HYDROCHLORIDE 200 MG: 200 TABLET ORAL at 09:12

## 2020-12-30 RX ADMIN — INSULIN ASPART 4 UNITS: 100 INJECTION, SOLUTION INTRAVENOUS; SUBCUTANEOUS at 04:12

## 2020-12-30 RX ADMIN — IPRATROPIUM BROMIDE AND ALBUTEROL SULFATE 3 ML: .5; 3 SOLUTION RESPIRATORY (INHALATION) at 12:12

## 2020-12-30 RX ADMIN — ATORVASTATIN CALCIUM 80 MG: 40 TABLET, FILM COATED ORAL at 09:12

## 2020-12-30 RX ADMIN — PROPOFOL 50 MG: 10 INJECTION, EMULSION INTRAVENOUS at 02:12

## 2020-12-30 RX ADMIN — AMIODARONE HYDROCHLORIDE 200 MG: 200 TABLET ORAL at 02:12

## 2020-12-30 RX ADMIN — AMIODARONE HYDROCHLORIDE 0.5 MG/MIN: 1.8 INJECTION, SOLUTION INTRAVENOUS at 03:12

## 2020-12-30 NOTE — ANESTHESIA PREPROCEDURE EVALUATION
12/30/2020  Chato Bowie is a 70 y.o., male.  Pre-operative evaluation for Procedure(s) (LRB):  Cardioversion or Defibrillation (N/A)    NPO >8h  METS >4    Admitted 12/29 for SOB, LE swelling found to be in Afib/flutter on IV amiodarone 0.5 mg/min gtt still with HR >90 needing cardioversion. MARIA cardioversion recently and continued on Eliquis. Requiring 3L NCfor SpO2 >90%.    Vitals:    12/30/20 0815 12/30/20 0830 12/30/20 0840 12/30/20 0845   BP:  (!) 137/103     BP Location:       Patient Position:       Pulse: 93 105 99 98   Resp: (!) 24 (!) 27 (!) 26 (!) 25   Temp:       TempSrc:       SpO2: (!) 93% (!) 94% (!) 94% (!) 93%   Weight:       Height:           Patient Active Problem List   Diagnosis    Abnormal EKG    Epigastric abdominal pain    Paroxysmal Afib/Aflutter    Sepsis    Intractable abdominal pain    Intractable vomiting with nausea    CAD (coronary artery disease)    Acute renal failure    Essential hypertension    Hyperlipidemia    Type 2 diabetes mellitus, uncontrolled    Anemia of chronic disease    Mild protein malnutrition    GERD (gastroesophageal reflux disease)    Hypokalemia    Alcohol abuse    Acute on chronic diastolic congestive heart failure    Acute pain of left knee    NSTEMI (non-ST elevated myocardial infarction)    Acute renal failure superimposed on stage 3 chronic kidney disease    CKD (chronic kidney disease), stage III    Hypomagnesemia    Chronic combined systolic and diastolic congestive heart failure    BPH (benign prostatic hyperplasia)    Aortic stenosis, moderate    Type 2 diabetes mellitus with hyperglycemia    Acute hypoxemic respiratory failure    Pneumonia of right lower lobe due to infectious organism    Chest pain    Atrial flutter    Congestive heart failure    Elevated serum creatinine       Review of patient's allergies  indicates:  No Known Allergies    No current facility-administered medications on file prior to encounter.      Current Outpatient Medications on File Prior to Encounter   Medication Sig Dispense Refill    apixaban (ELIQUIS) 5 mg Tab Take 1 tablet (5 mg total) by mouth 2 (two) times daily. 60 tablet 11    atorvastatin (LIPITOR) 80 MG tablet Take 1 tablet (80 mg total) by mouth every evening. 90 tablet 3    carvediloL (COREG) 25 MG tablet Take 1 tablet (25 mg total) by mouth 2 (two) times daily. 60 tablet 11    DUREZOL 0.05 % Drop ophthalmic solution INT 1 GTT INTO OD FID FOR 3 WEEKS      furosemide (LASIX) 40 MG tablet Take 1 tablet (40 mg total) by mouth once daily. 30 tablet 11    insulin aspart U-100 (NOVOLOG) 100 unit/mL (3 mL) InPn pen Inject 7 Units into the skin 3 (three) times daily. 6.3 mL 11    insulin detemir U-100 (LEVEMIR FLEXTOUCH) 100 unit/mL (3 mL) SubQ InPn pen Inject 20 Units into the skin once daily. 3 mL 0    magnesium oxide (MAG-OX) 400 mg (241.3 mg magnesium) tablet Take 800 mg by mouth.      multivitamin Tab Take 1 tablet by mouth once daily.      sildenafil (VIAGRA) 100 MG tablet Take 1 tablet (100 mg total) by mouth daily as needed for Erectile Dysfunction. *generic tabs* 30 tablet 11    tamsulosin (FLOMAX) 0.4 mg Cap Take 1 capsule (0.4 mg total) by mouth once daily. 30 capsule 11       Past Surgical History:   Procedure Laterality Date    EYE SURGERY      TREATMENT OF CARDIAC ARRHYTHMIA N/A 12/7/2020    Procedure: Cardioversion or Defibrillation;  Surgeon: Indra Foote MD;  Location: [facility] CATH LAB;  Service: Cardiology;  Laterality: N/A;    VASCULAR SURGERY         Social History     Socioeconomic History    Marital status: Single     Spouse name: Not on file    Number of children: Not on file    Years of education: Not on file    Highest education level: Not on file   Occupational History    Not on file   Social Needs    Financial resource strain: Not on file     Food insecurity     Worry: Not on file     Inability: Not on file    Transportation needs     Medical: Not on file     Non-medical: Not on file   Tobacco Use    Smoking status: Never Smoker    Smokeless tobacco: Never Used   Substance and Sexual Activity    Alcohol use: Not Currently     Alcohol/week: 4.0 standard drinks     Types: 4 Cans of beer per week    Drug use: No    Sexual activity: Not on file   Lifestyle    Physical activity     Days per week: Not on file     Minutes per session: Not on file    Stress: Not on file   Relationships    Social connections     Talks on phone: Not on file     Gets together: Not on file     Attends Baptism service: Not on file     Active member of club or organization: Not on file     Attends meetings of clubs or organizations: Not on file     Relationship status: Not on file   Other Topics Concern    Not on file   Social History Narrative    Not on file         CBC:   Recent Labs     12/29/20 0320   WBC 6.85   RBC 3.92*   HGB 11.0*   HCT 32.5*      MCV 83   MCH 28.1   MCHC 33.8       CMP:   Recent Labs     12/29/20 0320 12/30/20  0354    142   K 3.5 3.1*   CL 99 102   CO2 25 29   BUN 18 18   CREATININE 1.6* 1.6*   * 174*   MG 1.7  --    PHOS 3.4  --    CALCIUM 9.0 9.1   ALBUMIN 3.5  --    PROT 6.9  --    ALKPHOS 64  --    ALT 19  --    AST 24  --    BILITOT 0.7  --        INR  Recent Labs     12/29/20 0320   INR 1.0   APTT 25.6           Diagnostic Studies:  CXR 12/29     FINDINGS:  Cardiac monitoring leads overlie the chest.  The cardiomediastinal silhouette is stable.  Mediastinal structures are midline.  The visualized airway is within normal limits.  The lungs demonstrate increased interstitial attenuation which can be seen with edema or infectious or non infectious inflammatory processes.  There is a probable small volume of right pleural fluid.  No evidence of pneumothorax.  Visualized osseous structures are  intact.     Impression:     Please see above.        Electronically signed by: Gregoria Brooke MD  Date:                                            12/29/2020  Time:                                           03:41    EKG:    Vent. Rate : 116 BPM     Atrial Rate : 111 BPM      P-R Int : 000 ms          QRS Dur : 142 ms       QT Int : 390 ms       P-R-T Axes : 000 -49 233 degrees      QTc Int : 542 ms     Atrial flutter with 2 to 1 block   Left axis deviation   Intraventricular conduction delay   Abnormal ECG   When compared with ECG of 07-DEC-2020 14:44,   Significant changes have occurred   Confirmed by Tyrone Steen MD ([id number]) on 12/29/2020 7:29:39 PM      2D Echo:  11/25/20  · The left ventricle is normal in size with normal systolic function. The estimated ejection fraction is 55%.  · There is left ventricular concentric hypertrophy.  · Moderate left atrial enlargement.  · Normal left ventricular diastolic function.  · Normal right ventricular size with normal right ventricular systolic function.  · Mild-to-moderate aortic valve stenosis.  · Aortic valve area is 1.61 cm2; peak velocity is 3.23 m/s; mean gradient is 25 mmHg.  · Moderate aortic regurgitation.  · Mild mitral regurgitation.  · No pulmonary hypertension  Stress test 11/25/20    Normal myocardial perfusion scan. There is no evidence of myocardial ischemia or infarction.    There is a  moderate intensity fixed defect in the inferior wall of the left ventricle secondary to diaphragm attenuation.    Gated perfusion images showed an ejection fraction of 48% post stress.    There is normal wall motion post stress.    The EKG portion of this study is abnormal but not diagnostic.    There were no arrhythmias during stress.  Results for orders placed or performed during the hospital encounter of 03/09/17   2D echo with color flow doppler   Result Value Ref Range    EF 55 55 - 65    Diastolic Dysfunction Yes (A)     Aortic Valve Regurgitation MILD     Aortic  Valve Stenosis MILD (A)     Est. PA Systolic Pressure 15.53        Anesthesia Evaluation    I have reviewed the Patient Summary Reports.    I have reviewed the Nursing Notes. I have reviewed the NPO Status.   I have reviewed the Medications.     Review of Systems  Anesthesia Hx:  No problems with previous Anesthesia  History of prior surgery of interest to airway management or planning:  Denies Personal Hx of Anesthesia complications.   Social:  Non-Smoker, Alcohol Use    Hematology/Oncology:  Hematology Normal   Oncology Normal     EENT/Dental:EENT/Dental Normal   Cardiovascular:   Exercise tolerance: good Hypertension Past MI CAD  Dysrhythmias (on Eliquis) atrial fibrillation CHF ECG has been reviewed.    Pulmonary:   Pneumonia Shortness of breath    Renal/:   Chronic Renal Disease, ARF    Hepatic/GI:   GERD    Neurological:   Neuromuscular Disease,    Endocrine:   Diabetes, type 2    Psych:   Psychiatric History          Physical Exam  General:  Obesity    Airway/Jaw/Neck:  AIRWAY FINDINGS: Normal      Chest/Lungs:  Chest/Lungs Clear    Heart/Vascular:  Heart Findings: Rate: Tachycardia        Mental Status:  Mental Status Findings: Normal        Anesthesia Plan  Type of Anesthesia, risks & benefits discussed:  Anesthesia Type:  general  Patient's Preference:   Intra-op Monitoring Plan: standard ASA monitors  Intra-op Monitoring Plan Comments:   Post Op Pain Control Plan:   Post Op Pain Control Plan Comments:   Induction:   IV  Beta Blocker:  Patient is on a Beta-Blocker and has received one dose within the past 24 hours (No further documentation required).       Informed Consent: Patient understands risks and agrees with Anesthesia plan.  Questions answered. Anesthesia consent signed with patient.  ASA Score: 4     Day of Surgery Review of History & Physical:  There are no significant changes.  H&P update referred to the provider.     Anesthesia Plan Notes: Needed Albuterol 2 puff this AM for  wheezing.        Ready For Surgery From Anesthesia Perspective.

## 2020-12-30 NOTE — TRANSFER OF CARE
"Anesthesia Transfer of Care Note    Patient: Chato Bowie    Procedure(s) Performed: Procedure(s) (LRB):  Cardioversion or Defibrillation (N/A)    Patient location: Cath Lab    Anesthesia Type: general    Transport from OR: Transported from OR on 100% O2 by closed face mask with adequate spontaneous ventilation    Post pain: adequate analgesia    Post assessment: no apparent anesthetic complications and tolerated procedure well    Post vital signs: stable    Level of consciousness: sedated and responds to stimulation    Nausea/Vomiting: no nausea/vomiting    Complications: none    Transfer of care protocol was followed      Last vitals:   Visit Vitals  /84 (BP Location: Right arm, Patient Position: Lying)   Pulse 66   Temp 37 °C (98.6 °F) (Oral)   Resp 18   Ht 5' 11" (1.803 m)   Wt 102.2 kg (225 lb 5 oz)   SpO2 99%   BMI 31.42 kg/m²     "

## 2020-12-31 VITALS
OXYGEN SATURATION: 92 % | HEART RATE: 79 BPM | DIASTOLIC BLOOD PRESSURE: 79 MMHG | BODY MASS INDEX: 30.37 KG/M2 | RESPIRATION RATE: 18 BRPM | SYSTOLIC BLOOD PRESSURE: 143 MMHG | TEMPERATURE: 99 F | HEIGHT: 71 IN | WEIGHT: 216.94 LBS

## 2020-12-31 LAB
ANION GAP SERPL CALC-SCNC: 12 MMOL/L (ref 8–16)
BUN SERPL-MCNC: 20 MG/DL (ref 8–23)
CALCIUM SERPL-MCNC: 9.2 MG/DL (ref 8.7–10.5)
CHLORIDE SERPL-SCNC: 104 MMOL/L (ref 95–110)
CO2 SERPL-SCNC: 25 MMOL/L (ref 23–29)
CREAT SERPL-MCNC: 1.7 MG/DL (ref 0.5–1.4)
EST. GFR  (AFRICAN AMERICAN): 46 ML/MIN/1.73 M^2
EST. GFR  (NON AFRICAN AMERICAN): 40 ML/MIN/1.73 M^2
GLUCOSE SERPL-MCNC: 113 MG/DL (ref 70–110)
POCT GLUCOSE: 162 MG/DL (ref 70–110)
POCT GLUCOSE: 177 MG/DL (ref 70–110)
POTASSIUM SERPL-SCNC: 3.9 MMOL/L (ref 3.5–5.1)
SODIUM SERPL-SCNC: 141 MMOL/L (ref 136–145)

## 2020-12-31 PROCEDURE — 36415 COLL VENOUS BLD VENIPUNCTURE: CPT

## 2020-12-31 PROCEDURE — 99232 PR SUBSEQUENT HOSPITAL CARE,LEVL II: ICD-10-PCS | Mod: ,,, | Performed by: INTERNAL MEDICINE

## 2020-12-31 PROCEDURE — 80048 BASIC METABOLIC PNL TOTAL CA: CPT

## 2020-12-31 PROCEDURE — 25000003 PHARM REV CODE 250: Performed by: INTERNAL MEDICINE

## 2020-12-31 PROCEDURE — 99900035 HC TECH TIME PER 15 MIN (STAT)

## 2020-12-31 PROCEDURE — 99232 SBSQ HOSP IP/OBS MODERATE 35: CPT | Mod: ,,, | Performed by: INTERNAL MEDICINE

## 2020-12-31 PROCEDURE — 94660 CPAP INITIATION&MGMT: CPT

## 2020-12-31 PROCEDURE — 25000003 PHARM REV CODE 250: Performed by: STUDENT IN AN ORGANIZED HEALTH CARE EDUCATION/TRAINING PROGRAM

## 2020-12-31 RX ORDER — AMIODARONE HYDROCHLORIDE 200 MG/1
200 TABLET ORAL 2 TIMES DAILY
Qty: 60 TABLET | Refills: 0 | Status: ON HOLD | OUTPATIENT
Start: 2020-12-31 | End: 2021-03-24 | Stop reason: HOSPADM

## 2020-12-31 RX ORDER — INSULIN ASPART 100 [IU]/ML
5 INJECTION, SOLUTION INTRAVENOUS; SUBCUTANEOUS 3 TIMES DAILY
Qty: 4.5 ML | Refills: 11 | Status: SHIPPED | OUTPATIENT
Start: 2020-12-31 | End: 2023-09-19

## 2020-12-31 RX ADMIN — AMIODARONE HYDROCHLORIDE 200 MG: 200 TABLET ORAL at 09:12

## 2020-12-31 RX ADMIN — TAMSULOSIN HYDROCHLORIDE 0.4 MG: 0.4 CAPSULE ORAL at 09:12

## 2020-12-31 RX ADMIN — BENZONATATE 100 MG: 100 CAPSULE ORAL at 09:12

## 2020-12-31 RX ADMIN — INSULIN ASPART 4 UNITS: 100 INJECTION, SOLUTION INTRAVENOUS; SUBCUTANEOUS at 12:12

## 2020-12-31 RX ADMIN — APIXABAN 5 MG: 5 TABLET, FILM COATED ORAL at 09:12

## 2020-12-31 RX ADMIN — MUPIROCIN: 20 OINTMENT TOPICAL at 09:12

## 2020-12-31 RX ADMIN — INSULIN ASPART 4 UNITS: 100 INJECTION, SOLUTION INTRAVENOUS; SUBCUTANEOUS at 09:12

## 2020-12-31 NOTE — ANESTHESIA POSTPROCEDURE EVALUATION
Anesthesia Post Evaluation    Patient: Chato Bowie    Procedure(s) Performed: Procedure(s) (LRB):  Cardioversion or Defibrillation (N/A)    Final Anesthesia Type: general      Patient location: recovered in OR.  Patient participation: Yes- Able to Participate  Level of consciousness: awake and alert  Post-procedure vital signs: reviewed and stable  Pain management: adequate  Airway patency: patent    PONV status at discharge: No PONV  Anesthetic complications: no      Cardiovascular status: blood pressure returned to baseline and hemodynamically stable  Respiratory status: unassisted and spontaneous ventilation  Hydration status: euvolemic  Follow-up not needed.          Vitals Value Taken Time   /88 12/31/20 0507   Temp 36.8 °C (98.2 °F) 12/31/20 0507   Pulse 66 12/31/20 0507   Resp 20 12/31/20 0507   SpO2 100 % 12/31/20 0507         No case tracking events are documented in the log.      Pain/Avery Score: Avery Score: 10 (12/30/2020  2:07 PM)

## 2021-01-04 ENCOUNTER — PATIENT OUTREACH (OUTPATIENT)
Dept: ADMINISTRATIVE | Facility: CLINIC | Age: 71
End: 2021-01-04

## 2021-03-22 ENCOUNTER — HOSPITAL ENCOUNTER (OUTPATIENT)
Facility: HOSPITAL | Age: 71
Discharge: HOME | End: 2021-03-24
Attending: EMERGENCY MEDICINE | Admitting: EMERGENCY MEDICINE
Payer: MEDICARE

## 2021-03-22 DIAGNOSIS — I48.92 ATRIAL FLUTTER, UNSPECIFIED TYPE: ICD-10-CM

## 2021-03-22 DIAGNOSIS — R07.9 CHEST PAIN, UNSPECIFIED TYPE: ICD-10-CM

## 2021-03-22 DIAGNOSIS — R06.00 DYSPNEA: ICD-10-CM

## 2021-03-22 DIAGNOSIS — I50.9 CONGESTIVE HEART FAILURE, UNSPECIFIED HF CHRONICITY, UNSPECIFIED HEART FAILURE TYPE: ICD-10-CM

## 2021-03-22 DIAGNOSIS — R07.9 CHEST PAIN: ICD-10-CM

## 2021-03-22 DIAGNOSIS — I50.9 CHF (CONGESTIVE HEART FAILURE): ICD-10-CM

## 2021-03-22 DIAGNOSIS — I50.33 ACUTE ON CHRONIC DIASTOLIC CONGESTIVE HEART FAILURE: Primary | ICD-10-CM

## 2021-03-22 LAB
ALBUMIN SERPL BCP-MCNC: 3.5 G/DL (ref 3.5–5.2)
ALP SERPL-CCNC: 70 U/L (ref 55–135)
ALT SERPL W/O P-5'-P-CCNC: 18 U/L (ref 10–44)
ANION GAP SERPL CALC-SCNC: 14 MMOL/L (ref 8–16)
AST SERPL-CCNC: 18 U/L (ref 10–40)
BACTERIA #/AREA URNS HPF: ABNORMAL /HPF
BASOPHILS # BLD AUTO: 0.11 K/UL (ref 0–0.2)
BASOPHILS NFR BLD: 1.6 % (ref 0–1.9)
BILIRUB SERPL-MCNC: 0.3 MG/DL (ref 0.1–1)
BILIRUB UR QL STRIP: NEGATIVE
BNP SERPL-MCNC: 750 PG/ML (ref 0–99)
BUN SERPL-MCNC: 40 MG/DL (ref 8–23)
CALCIUM SERPL-MCNC: 8.6 MG/DL (ref 8.7–10.5)
CHLORIDE SERPL-SCNC: 98 MMOL/L (ref 95–110)
CLARITY UR: CLEAR
CO2 SERPL-SCNC: 24 MMOL/L (ref 23–29)
COLOR UR: YELLOW
CREAT SERPL-MCNC: 2 MG/DL (ref 0.5–1.4)
CTP QC/QA: YES
DIFFERENTIAL METHOD: ABNORMAL
EOSINOPHIL # BLD AUTO: 0.7 K/UL (ref 0–0.5)
EOSINOPHIL NFR BLD: 9.7 % (ref 0–8)
ERYTHROCYTE [DISTWIDTH] IN BLOOD BY AUTOMATED COUNT: 13.1 % (ref 11.5–14.5)
EST. GFR  (AFRICAN AMERICAN): 38 ML/MIN/1.73 M^2
EST. GFR  (NON AFRICAN AMERICAN): 33 ML/MIN/1.73 M^2
GLUCOSE SERPL-MCNC: 153 MG/DL (ref 70–110)
GLUCOSE UR QL STRIP: NEGATIVE
HCT VFR BLD AUTO: 36.1 % (ref 40–54)
HGB BLD-MCNC: 12 G/DL (ref 14–18)
HGB UR QL STRIP: ABNORMAL
HYALINE CASTS #/AREA URNS LPF: 5 /LPF
IMM GRANULOCYTES # BLD AUTO: 0.02 K/UL (ref 0–0.04)
IMM GRANULOCYTES NFR BLD AUTO: 0.3 % (ref 0–0.5)
KETONES UR QL STRIP: NEGATIVE
LEUKOCYTE ESTERASE UR QL STRIP: NEGATIVE
LYMPHOCYTES # BLD AUTO: 1 K/UL (ref 1–4.8)
LYMPHOCYTES NFR BLD: 14.7 % (ref 18–48)
MCH RBC QN AUTO: 26.8 PG (ref 27–31)
MCHC RBC AUTO-ENTMCNC: 33.2 G/DL (ref 32–36)
MCV RBC AUTO: 81 FL (ref 82–98)
MICROSCOPIC COMMENT: ABNORMAL
MONOCYTES # BLD AUTO: 0.9 K/UL (ref 0.3–1)
MONOCYTES NFR BLD: 13.4 % (ref 4–15)
NEUTROPHILS # BLD AUTO: 4.2 K/UL (ref 1.8–7.7)
NEUTROPHILS NFR BLD: 60.3 % (ref 38–73)
NITRITE UR QL STRIP: NEGATIVE
NRBC BLD-RTO: 0 /100 WBC
PH UR STRIP: 6 [PH] (ref 5–8)
PLATELET # BLD AUTO: 278 K/UL (ref 150–350)
PMV BLD AUTO: 8.9 FL (ref 9.2–12.9)
POTASSIUM SERPL-SCNC: 3.4 MMOL/L (ref 3.5–5.1)
PROT SERPL-MCNC: 6.8 G/DL (ref 6–8.4)
PROT UR QL STRIP: ABNORMAL
RBC # BLD AUTO: 4.48 M/UL (ref 4.6–6.2)
RBC #/AREA URNS HPF: 14 /HPF (ref 0–4)
SARS-COV-2 RDRP RESP QL NAA+PROBE: NEGATIVE
SODIUM SERPL-SCNC: 136 MMOL/L (ref 136–145)
SP GR UR STRIP: 1.01 (ref 1–1.03)
TROPONIN I SERPL DL<=0.01 NG/ML-MCNC: 0.02 NG/ML (ref 0–0.03)
URN SPEC COLLECT METH UR: ABNORMAL
UROBILINOGEN UR STRIP-ACNC: NEGATIVE EU/DL
WBC # BLD AUTO: 6.92 K/UL (ref 3.9–12.7)
WBC #/AREA URNS HPF: 2 /HPF (ref 0–5)

## 2021-03-22 PROCEDURE — 63600175 PHARM REV CODE 636 W HCPCS: Performed by: EMERGENCY MEDICINE

## 2021-03-22 PROCEDURE — 96374 THER/PROPH/DIAG INJ IV PUSH: CPT

## 2021-03-22 PROCEDURE — G0378 HOSPITAL OBSERVATION PER HR: HCPCS

## 2021-03-22 PROCEDURE — 99285 EMERGENCY DEPT VISIT HI MDM: CPT | Mod: 25

## 2021-03-22 PROCEDURE — 25000242 PHARM REV CODE 250 ALT 637 W/ HCPCS: Performed by: EMERGENCY MEDICINE

## 2021-03-22 PROCEDURE — U0002 COVID-19 LAB TEST NON-CDC: HCPCS | Performed by: EMERGENCY MEDICINE

## 2021-03-22 PROCEDURE — 83880 ASSAY OF NATRIURETIC PEPTIDE: CPT | Performed by: EMERGENCY MEDICINE

## 2021-03-22 PROCEDURE — 94640 AIRWAY INHALATION TREATMENT: CPT

## 2021-03-22 PROCEDURE — 84484 ASSAY OF TROPONIN QUANT: CPT | Performed by: EMERGENCY MEDICINE

## 2021-03-22 PROCEDURE — 81000 URINALYSIS NONAUTO W/SCOPE: CPT | Performed by: EMERGENCY MEDICINE

## 2021-03-22 PROCEDURE — 80053 COMPREHEN METABOLIC PANEL: CPT | Performed by: EMERGENCY MEDICINE

## 2021-03-22 PROCEDURE — 85025 COMPLETE CBC W/AUTO DIFF WBC: CPT | Performed by: EMERGENCY MEDICINE

## 2021-03-22 RX ORDER — FUROSEMIDE 10 MG/ML
40 INJECTION INTRAMUSCULAR; INTRAVENOUS
Status: DISCONTINUED | OUTPATIENT
Start: 2021-03-23 | End: 2021-03-24 | Stop reason: HOSPADM

## 2021-03-22 RX ORDER — SODIUM CHLORIDE 0.9 % (FLUSH) 0.9 %
10 SYRINGE (ML) INJECTION
Status: DISCONTINUED | OUTPATIENT
Start: 2021-03-22 | End: 2021-03-24 | Stop reason: HOSPADM

## 2021-03-22 RX ORDER — FUROSEMIDE 10 MG/ML
60 INJECTION INTRAMUSCULAR; INTRAVENOUS
Status: COMPLETED | OUTPATIENT
Start: 2021-03-22 | End: 2021-03-22

## 2021-03-22 RX ORDER — IBUPROFEN 200 MG
16 TABLET ORAL
Status: DISCONTINUED | OUTPATIENT
Start: 2021-03-22 | End: 2021-03-24 | Stop reason: HOSPADM

## 2021-03-22 RX ORDER — IBUPROFEN 200 MG
24 TABLET ORAL
Status: DISCONTINUED | OUTPATIENT
Start: 2021-03-22 | End: 2021-03-24 | Stop reason: HOSPADM

## 2021-03-22 RX ORDER — GLUCAGON 1 MG
1 KIT INJECTION
Status: DISCONTINUED | OUTPATIENT
Start: 2021-03-22 | End: 2021-03-24 | Stop reason: HOSPADM

## 2021-03-22 RX ORDER — ALBUTEROL SULFATE 2.5 MG/.5ML
2.5 SOLUTION RESPIRATORY (INHALATION)
Status: COMPLETED | OUTPATIENT
Start: 2021-03-22 | End: 2021-03-22

## 2021-03-22 RX ADMIN — FUROSEMIDE 60 MG: 10 INJECTION, SOLUTION INTRAMUSCULAR; INTRAVENOUS at 06:03

## 2021-03-22 RX ADMIN — ALBUTEROL SULFATE 2.5 MG: 2.5 SOLUTION RESPIRATORY (INHALATION) at 07:03

## 2021-03-23 PROBLEM — N18.4 CKD (CHRONIC KIDNEY DISEASE), STAGE IV: Status: ACTIVE | Noted: 2021-03-23

## 2021-03-23 PROBLEM — R09.02 HYPOXIA: Status: ACTIVE | Noted: 2021-03-23

## 2021-03-23 LAB
ALBUMIN SERPL BCP-MCNC: 3.6 G/DL (ref 3.5–5.2)
ALP SERPL-CCNC: 73 U/L (ref 55–135)
ALT SERPL W/O P-5'-P-CCNC: 15 U/L (ref 10–44)
ANION GAP SERPL CALC-SCNC: 12 MMOL/L (ref 8–16)
AORTIC ROOT ANNULUS: 3.51 CM
AORTIC VALVE CUSP SEPERATION: 1.05 CM
ASCENDING AORTA: 2.88 CM
AST SERPL-CCNC: 19 U/L (ref 10–40)
AV INDEX (PROSTH): 0.3
AV MEAN GRADIENT: 22 MMHG
AV PEAK GRADIENT: 40 MMHG
AV VALVE AREA: 1.9 CM2
AV VELOCITY RATIO: 0.31
BASOPHILS # BLD AUTO: 0.09 K/UL (ref 0–0.2)
BASOPHILS NFR BLD: 1.4 % (ref 0–1.9)
BILIRUB SERPL-MCNC: 0.6 MG/DL (ref 0.1–1)
BSA FOR ECHO PROCEDURE: 2.18 M2
BUN SERPL-MCNC: 38 MG/DL (ref 8–23)
CALCIUM SERPL-MCNC: 9.4 MG/DL (ref 8.7–10.5)
CHLORIDE SERPL-SCNC: 98 MMOL/L (ref 95–110)
CO2 SERPL-SCNC: 28 MMOL/L (ref 23–29)
CREAT SERPL-MCNC: 2.1 MG/DL (ref 0.5–1.4)
CV ECHO LV RWT: 0.39 CM
DIFFERENTIAL METHOD: ABNORMAL
DOP CALC AO PEAK VEL: 3.16 M/S
DOP CALC AO VTI: 75.35 CM
DOP CALC LVOT AREA: 6.3 CM2
DOP CALC LVOT DIAMETER: 2.83 CM
DOP CALC LVOT PEAK VEL: 0.98 M/S
DOP CALC LVOT STROKE VOLUME: 142.97 CM3
DOP CALCLVOT PEAK VEL VTI: 22.74 CM
E WAVE DECELERATION TIME: 249.11 MSEC
E/A RATIO: 2.98
E/E' RATIO: 27.08 M/S
ECHO LV POSTERIOR WALL: 1.13 CM (ref 0.6–1.1)
EOSINOPHIL # BLD AUTO: 0.6 K/UL (ref 0–0.5)
EOSINOPHIL NFR BLD: 9.4 % (ref 0–8)
ERYTHROCYTE [DISTWIDTH] IN BLOOD BY AUTOMATED COUNT: 13.3 % (ref 11.5–14.5)
EST. GFR  (AFRICAN AMERICAN): 36 ML/MIN/1.73 M^2
EST. GFR  (NON AFRICAN AMERICAN): 31 ML/MIN/1.73 M^2
ESTIMATED AVG GLUCOSE: 171 MG/DL (ref 68–131)
FRACTIONAL SHORTENING: 31 % (ref 28–44)
GLUCOSE SERPL-MCNC: 124 MG/DL (ref 70–110)
HBA1C MFR BLD: 7.6 % (ref 4–5.6)
HCT VFR BLD AUTO: 36.3 % (ref 40–54)
HGB BLD-MCNC: 12 G/DL (ref 14–18)
IMM GRANULOCYTES # BLD AUTO: 0.02 K/UL (ref 0–0.04)
IMM GRANULOCYTES NFR BLD AUTO: 0.3 % (ref 0–0.5)
INR PPP: 1.1 (ref 0.8–1.2)
INTERVENTRICULAR SEPTUM: 1.07 CM (ref 0.6–1.1)
IVRT: 88.81 MSEC
LA MAJOR: 5.48 CM
LA MINOR: 5.48 CM
LA WIDTH: 4.81 CM
LEFT ATRIUM SIZE: 4.72 CM
LEFT ATRIUM VOLUME INDEX: 46 ML/M2
LEFT ATRIUM VOLUME: 105.75 CM3
LEFT INTERNAL DIMENSION IN SYSTOLE: 3.99 CM (ref 2.1–4)
LEFT VENTRICLE DIASTOLIC VOLUME INDEX: 72.48 ML/M2
LEFT VENTRICLE DIASTOLIC VOLUME: 166.71 ML
LEFT VENTRICLE MASS INDEX: 115 G/M2
LEFT VENTRICLE SYSTOLIC VOLUME INDEX: 30.3 ML/M2
LEFT VENTRICLE SYSTOLIC VOLUME: 69.69 ML
LEFT VENTRICULAR INTERNAL DIMENSION IN DIASTOLE: 5.8 CM (ref 3.5–6)
LEFT VENTRICULAR MASS: 264.25 G
LV LATERAL E/E' RATIO: 22 M/S
LV SEPTAL E/E' RATIO: 35.2 M/S
LYMPHOCYTES # BLD AUTO: 0.9 K/UL (ref 1–4.8)
LYMPHOCYTES NFR BLD: 14.5 % (ref 18–48)
MAGNESIUM SERPL-MCNC: 1.8 MG/DL (ref 1.6–2.6)
MCH RBC QN AUTO: 26.9 PG (ref 27–31)
MCHC RBC AUTO-ENTMCNC: 33.1 G/DL (ref 32–36)
MCV RBC AUTO: 81 FL (ref 82–98)
MONOCYTES # BLD AUTO: 0.8 K/UL (ref 0.3–1)
MONOCYTES NFR BLD: 12.4 % (ref 4–15)
MV PEAK A VEL: 0.59 M/S
MV PEAK E VEL: 1.76 M/S
MV STENOSIS PRESSURE HALF TIME: 90.46 MS
MV VALVE AREA P 1/2 METHOD: 2.43 CM2
NEUTROPHILS # BLD AUTO: 3.9 K/UL (ref 1.8–7.7)
NEUTROPHILS NFR BLD: 62 % (ref 38–73)
NRBC BLD-RTO: 0 /100 WBC
PISA TR MAX VEL: 3.55 M/S
PLATELET # BLD AUTO: 288 K/UL (ref 150–350)
PMV BLD AUTO: 9.3 FL (ref 9.2–12.9)
POCT GLUCOSE: 122 MG/DL (ref 70–110)
POCT GLUCOSE: 126 MG/DL (ref 70–110)
POCT GLUCOSE: 151 MG/DL (ref 70–110)
POCT GLUCOSE: 162 MG/DL (ref 70–110)
POTASSIUM SERPL-SCNC: 3.4 MMOL/L (ref 3.5–5.1)
PROT SERPL-MCNC: 6.8 G/DL (ref 6–8.4)
PROTHROMBIN TIME: 11.5 SEC (ref 9–12.5)
PV PEAK VELOCITY: 1.1 CM/S
RA MAJOR: 4.79 CM
RA PRESSURE: 3 MMHG
RA WIDTH: 2.98 CM
RBC # BLD AUTO: 4.46 M/UL (ref 4.6–6.2)
RIGHT VENTRICULAR END-DIASTOLIC DIMENSION: 3.8 CM
SINUS: 3.28 CM
SODIUM SERPL-SCNC: 138 MMOL/L (ref 136–145)
STJ: 2.62 CM
T4 FREE SERPL-MCNC: 1.17 NG/DL (ref 0.71–1.51)
TDI LATERAL: 0.08 M/S
TDI SEPTAL: 0.05 M/S
TDI: 0.07 M/S
TR MAX PG: 50 MMHG
TRICUSPID ANNULAR PLANE SYSTOLIC EXCURSION: 1.68 CM
TROPONIN I SERPL DL<=0.01 NG/ML-MCNC: 0.02 NG/ML (ref 0–0.03)
TROPONIN I SERPL DL<=0.01 NG/ML-MCNC: 0.03 NG/ML (ref 0–0.03)
TSH SERPL DL<=0.005 MIU/L-ACNC: 5.73 UIU/ML (ref 0.4–4)
TV REST PULMONARY ARTERY PRESSURE: 53 MMHG
WBC # BLD AUTO: 6.35 K/UL (ref 3.9–12.7)

## 2021-03-23 PROCEDURE — 85610 PROTHROMBIN TIME: CPT | Performed by: NURSE PRACTITIONER

## 2021-03-23 PROCEDURE — 25000003 PHARM REV CODE 250: Performed by: HOSPITALIST

## 2021-03-23 PROCEDURE — 93010 ELECTROCARDIOGRAM REPORT: CPT | Mod: ,,, | Performed by: INTERNAL MEDICINE

## 2021-03-23 PROCEDURE — 84484 ASSAY OF TROPONIN QUANT: CPT | Performed by: PHYSICIAN ASSISTANT

## 2021-03-23 PROCEDURE — 63600175 PHARM REV CODE 636 W HCPCS: Performed by: PHYSICIAN ASSISTANT

## 2021-03-23 PROCEDURE — 99220 PR INITIAL OBSERVATION CARE,LEVL III: CPT | Mod: 25,,, | Performed by: INTERNAL MEDICINE

## 2021-03-23 PROCEDURE — 84484 ASSAY OF TROPONIN QUANT: CPT | Mod: 91 | Performed by: PHYSICIAN ASSISTANT

## 2021-03-23 PROCEDURE — 99220 PR INITIAL OBSERVATION CARE,LEVL III: ICD-10-PCS | Mod: 25,,, | Performed by: INTERNAL MEDICINE

## 2021-03-23 PROCEDURE — 93005 ELECTROCARDIOGRAM TRACING: CPT

## 2021-03-23 PROCEDURE — 85025 COMPLETE CBC W/AUTO DIFF WBC: CPT | Performed by: PHYSICIAN ASSISTANT

## 2021-03-23 PROCEDURE — 36415 COLL VENOUS BLD VENIPUNCTURE: CPT | Performed by: NURSE PRACTITIONER

## 2021-03-23 PROCEDURE — 96376 TX/PRO/DX INJ SAME DRUG ADON: CPT

## 2021-03-23 PROCEDURE — 93010 EKG 12-LEAD: ICD-10-PCS | Mod: ,,, | Performed by: INTERNAL MEDICINE

## 2021-03-23 PROCEDURE — 84443 ASSAY THYROID STIM HORMONE: CPT | Performed by: PHYSICIAN ASSISTANT

## 2021-03-23 PROCEDURE — 80053 COMPREHEN METABOLIC PANEL: CPT | Performed by: PHYSICIAN ASSISTANT

## 2021-03-23 PROCEDURE — 84439 ASSAY OF FREE THYROXINE: CPT | Performed by: PHYSICIAN ASSISTANT

## 2021-03-23 PROCEDURE — G0378 HOSPITAL OBSERVATION PER HR: HCPCS

## 2021-03-23 PROCEDURE — 83735 ASSAY OF MAGNESIUM: CPT | Performed by: PHYSICIAN ASSISTANT

## 2021-03-23 PROCEDURE — 25000003 PHARM REV CODE 250: Performed by: NURSE PRACTITIONER

## 2021-03-23 PROCEDURE — 83036 HEMOGLOBIN GLYCOSYLATED A1C: CPT | Performed by: PHYSICIAN ASSISTANT

## 2021-03-23 RX ORDER — TALC
6 POWDER (GRAM) TOPICAL NIGHTLY PRN
Status: DISCONTINUED | OUTPATIENT
Start: 2021-03-23 | End: 2021-03-24 | Stop reason: HOSPADM

## 2021-03-23 RX ORDER — ROSUVASTATIN CALCIUM 20 MG/1
20 TABLET, COATED ORAL DAILY
COMMUNITY
Start: 2021-02-09 | End: 2023-06-01 | Stop reason: DRUGHIGH

## 2021-03-23 RX ORDER — HYDRALAZINE HYDROCHLORIDE 25 MG/1
50 TABLET, FILM COATED ORAL 3 TIMES DAILY
Status: DISCONTINUED | OUTPATIENT
Start: 2021-03-23 | End: 2021-03-23

## 2021-03-23 RX ORDER — TAMSULOSIN HYDROCHLORIDE 0.4 MG/1
0.4 CAPSULE ORAL DAILY
Status: DISCONTINUED | OUTPATIENT
Start: 2021-03-23 | End: 2021-03-24 | Stop reason: HOSPADM

## 2021-03-23 RX ORDER — AMIODARONE HYDROCHLORIDE 200 MG/1
200 TABLET ORAL 2 TIMES DAILY
Status: DISCONTINUED | OUTPATIENT
Start: 2021-03-23 | End: 2021-03-23

## 2021-03-23 RX ORDER — INSULIN ASPART 100 [IU]/ML
0-5 INJECTION, SOLUTION INTRAVENOUS; SUBCUTANEOUS EVERY 6 HOURS PRN
Status: DISCONTINUED | OUTPATIENT
Start: 2021-03-23 | End: 2021-03-24 | Stop reason: HOSPADM

## 2021-03-23 RX ORDER — PANTOPRAZOLE SODIUM 40 MG/1
40 TABLET, DELAYED RELEASE ORAL DAILY
Status: DISCONTINUED | OUTPATIENT
Start: 2021-03-23 | End: 2021-03-24 | Stop reason: HOSPADM

## 2021-03-23 RX ORDER — AMLODIPINE BESYLATE 10 MG/1
10 TABLET ORAL DAILY
Status: ON HOLD | COMMUNITY
Start: 2021-02-09 | End: 2021-06-12

## 2021-03-23 RX ORDER — HYDRALAZINE HYDROCHLORIDE 20 MG/ML
10 INJECTION INTRAMUSCULAR; INTRAVENOUS EVERY 8 HOURS PRN
Status: DISCONTINUED | OUTPATIENT
Start: 2021-03-23 | End: 2021-03-24 | Stop reason: HOSPADM

## 2021-03-23 RX ORDER — ATORVASTATIN CALCIUM 40 MG/1
80 TABLET, FILM COATED ORAL NIGHTLY
Status: DISCONTINUED | OUTPATIENT
Start: 2021-03-23 | End: 2021-03-24 | Stop reason: HOSPADM

## 2021-03-23 RX ORDER — HYDRALAZINE HYDROCHLORIDE 50 MG/1
50 TABLET, FILM COATED ORAL 3 TIMES DAILY
Status: ON HOLD | COMMUNITY
Start: 2021-01-04 | End: 2021-03-24 | Stop reason: HOSPADM

## 2021-03-23 RX ORDER — POTASSIUM CHLORIDE 20 MEQ/1
40 TABLET, EXTENDED RELEASE ORAL ONCE
Status: COMPLETED | OUTPATIENT
Start: 2021-03-23 | End: 2021-03-23

## 2021-03-23 RX ORDER — AMLODIPINE BESYLATE 5 MG/1
10 TABLET ORAL DAILY
Status: DISCONTINUED | OUTPATIENT
Start: 2021-03-23 | End: 2021-03-23

## 2021-03-23 RX ORDER — HYDRALAZINE HYDROCHLORIDE 25 MG/1
50 TABLET, FILM COATED ORAL 3 TIMES DAILY
Status: DISCONTINUED | OUTPATIENT
Start: 2021-03-23 | End: 2021-03-24

## 2021-03-23 RX ORDER — ACETAMINOPHEN 500 MG
500 TABLET ORAL EVERY 6 HOURS PRN
Status: DISCONTINUED | OUTPATIENT
Start: 2021-03-23 | End: 2021-03-24 | Stop reason: HOSPADM

## 2021-03-23 RX ORDER — OMEPRAZOLE 20 MG/1
20 CAPSULE, DELAYED RELEASE ORAL DAILY
Status: ON HOLD | COMMUNITY
Start: 2021-03-08 | End: 2022-02-10 | Stop reason: HOSPADM

## 2021-03-23 RX ORDER — NITROGLYCERIN 0.4 MG/1
0.4 TABLET SUBLINGUAL EVERY 5 MIN PRN
Status: DISCONTINUED | OUTPATIENT
Start: 2021-03-23 | End: 2021-03-24 | Stop reason: HOSPADM

## 2021-03-23 RX ORDER — NAPROXEN SODIUM 220 MG/1
81 TABLET, FILM COATED ORAL DAILY
Status: DISCONTINUED | OUTPATIENT
Start: 2021-03-23 | End: 2021-03-24 | Stop reason: HOSPADM

## 2021-03-23 RX ORDER — AMIODARONE HYDROCHLORIDE 200 MG/1
200 TABLET ORAL DAILY
Status: DISCONTINUED | OUTPATIENT
Start: 2021-03-24 | End: 2021-03-23

## 2021-03-23 RX ORDER — AMLODIPINE BESYLATE 5 MG/1
10 TABLET ORAL DAILY
Status: DISCONTINUED | OUTPATIENT
Start: 2021-03-23 | End: 2021-03-24 | Stop reason: HOSPADM

## 2021-03-23 RX ADMIN — ASPIRIN 81 MG: 81 TABLET, CHEWABLE ORAL at 09:03

## 2021-03-23 RX ADMIN — HYDRALAZINE HYDROCHLORIDE 50 MG: 25 TABLET, FILM COATED ORAL at 03:03

## 2021-03-23 RX ADMIN — FUROSEMIDE 40 MG: 10 INJECTION, SOLUTION INTRAVENOUS at 09:03

## 2021-03-23 RX ADMIN — AMIODARONE HYDROCHLORIDE 200 MG: 200 TABLET ORAL at 03:03

## 2021-03-23 RX ADMIN — TAMSULOSIN HYDROCHLORIDE 0.4 MG: 0.4 CAPSULE ORAL at 09:03

## 2021-03-23 RX ADMIN — AMLODIPINE BESYLATE 10 MG: 5 TABLET ORAL at 05:03

## 2021-03-23 RX ADMIN — APIXABAN 5 MG: 5 TABLET, FILM COATED ORAL at 02:03

## 2021-03-23 RX ADMIN — HYDRALAZINE HYDROCHLORIDE 50 MG: 25 TABLET, FILM COATED ORAL at 05:03

## 2021-03-23 RX ADMIN — PANTOPRAZOLE SODIUM 40 MG: 40 TABLET, DELAYED RELEASE ORAL at 09:03

## 2021-03-23 RX ADMIN — POTASSIUM BICARBONATE 50 MEQ: 978 TABLET, EFFERVESCENT ORAL at 07:03

## 2021-03-23 RX ADMIN — ATORVASTATIN CALCIUM 80 MG: 40 TABLET, FILM COATED ORAL at 02:03

## 2021-03-23 RX ADMIN — APIXABAN 5 MG: 5 TABLET, FILM COATED ORAL at 09:03

## 2021-03-23 RX ADMIN — HYDRALAZINE HYDROCHLORIDE 50 MG: 25 TABLET, FILM COATED ORAL at 09:03

## 2021-03-23 RX ADMIN — MELATONIN TAB 3 MG 6 MG: 3 TAB at 09:03

## 2021-03-23 RX ADMIN — POTASSIUM CHLORIDE 40 MEQ: 1500 TABLET, EXTENDED RELEASE ORAL at 09:03

## 2021-03-23 RX ADMIN — ATORVASTATIN CALCIUM 80 MG: 40 TABLET, FILM COATED ORAL at 09:03

## 2021-03-24 VITALS
WEIGHT: 244.81 LBS | HEART RATE: 98 BPM | HEIGHT: 71 IN | RESPIRATION RATE: 19 BRPM | TEMPERATURE: 98 F | DIASTOLIC BLOOD PRESSURE: 84 MMHG | BODY MASS INDEX: 34.27 KG/M2 | SYSTOLIC BLOOD PRESSURE: 157 MMHG | OXYGEN SATURATION: 96 %

## 2021-03-24 LAB
POCT GLUCOSE: 161 MG/DL (ref 70–110)
POCT GLUCOSE: 177 MG/DL (ref 70–110)

## 2021-03-24 PROCEDURE — 25000003 PHARM REV CODE 250: Performed by: NURSE PRACTITIONER

## 2021-03-24 PROCEDURE — 63600175 PHARM REV CODE 636 W HCPCS: Performed by: PHYSICIAN ASSISTANT

## 2021-03-24 PROCEDURE — 96376 TX/PRO/DX INJ SAME DRUG ADON: CPT

## 2021-03-24 PROCEDURE — 25000003 PHARM REV CODE 250: Performed by: HOSPITALIST

## 2021-03-24 PROCEDURE — G0378 HOSPITAL OBSERVATION PER HR: HCPCS

## 2021-03-24 PROCEDURE — 94761 N-INVAS EAR/PLS OXIMETRY MLT: CPT

## 2021-03-24 RX ORDER — HYDRALAZINE HYDROCHLORIDE 100 MG/1
100 TABLET, FILM COATED ORAL 3 TIMES DAILY
Qty: 90 TABLET | Refills: 5 | Status: ON HOLD | OUTPATIENT
Start: 2021-03-24 | End: 2021-06-12

## 2021-03-24 RX ORDER — HYDRALAZINE HYDROCHLORIDE 25 MG/1
100 TABLET, FILM COATED ORAL 3 TIMES DAILY
Status: DISCONTINUED | OUTPATIENT
Start: 2021-03-24 | End: 2021-03-24 | Stop reason: HOSPADM

## 2021-03-24 RX ORDER — FUROSEMIDE 40 MG/1
40 TABLET ORAL DAILY
Qty: 60 TABLET | Refills: 5 | Status: ON HOLD | OUTPATIENT
Start: 2021-03-24 | End: 2021-06-12 | Stop reason: SDUPTHER

## 2021-03-24 RX ORDER — NAPROXEN SODIUM 220 MG/1
81 TABLET, FILM COATED ORAL DAILY
Refills: 0 | Status: ON HOLD
Start: 2021-03-24 | End: 2023-01-06 | Stop reason: HOSPADM

## 2021-03-24 RX ADMIN — TAMSULOSIN HYDROCHLORIDE 0.4 MG: 0.4 CAPSULE ORAL at 09:03

## 2021-03-24 RX ADMIN — ASPIRIN 81 MG: 81 TABLET, CHEWABLE ORAL at 09:03

## 2021-03-24 RX ADMIN — HYDRALAZINE HYDROCHLORIDE 100 MG: 25 TABLET, FILM COATED ORAL at 06:03

## 2021-03-24 RX ADMIN — FUROSEMIDE 40 MG: 10 INJECTION, SOLUTION INTRAVENOUS at 09:03

## 2021-03-24 RX ADMIN — AMLODIPINE BESYLATE 10 MG: 5 TABLET ORAL at 09:03

## 2021-03-24 RX ADMIN — PANTOPRAZOLE SODIUM 40 MG: 40 TABLET, DELAYED RELEASE ORAL at 09:03

## 2021-03-24 RX ADMIN — APIXABAN 5 MG: 5 TABLET, FILM COATED ORAL at 09:03

## 2021-03-24 RX ADMIN — HYDRALAZINE HYDROCHLORIDE 100 MG: 25 TABLET, FILM COATED ORAL at 09:03

## 2021-04-07 NOTE — PLAN OF CARE
Problem: Adult Inpatient Plan of Care  Goal: Plan of Care Review  Outcome: Ongoing, Progressing  Flowsheets (Taken 11/25/2020 0327)  Plan of Care Reviewed With: patient    Patient was admitted last night for NSTEMI On arrival to ICU patient is awake alert oriented. Able to move independently on bed without any assistance. Vital signs stable and within limit. Potassium level remain critically low despite replacement in ER. Additional replacement was given as per order awaiting Lab redraw this morning at 5 am. NPO post midnight was initiated Possible cath this morning. Still on Heparin drip and titrated as per normogram.      Topical Retinoid Pregnancy And Lactation Text: This medication is Pregnancy Category C. It is unknown if this medication is excreted in breast milk. Ivermectin Pregnancy And Lactation Text: This medication is Pregnancy Category C and it isn't known if it is safe during pregnancy. It is also excreted in breast milk. Erivedge Counseling- I discussed with the patient the risks of Erivedge including but not limited to nausea, vomiting, diarrhea, constipation, weight loss, changes in the sense of taste, decreased appetite, muscle spasms, and hair loss.  The patient verbalized understanding of the proper use and possible adverse effects of Erivedge.  All of the patient's questions and concerns were addressed. Metronidazole Counseling:  I discussed with the patient the risks of metronidazole including but not limited to seizures, nausea/vomiting, a metallic taste in the mouth, nausea/vomiting and severe allergy. Griseofulvin Pregnancy And Lactation Text: This medication is Pregnancy Category X and is known to cause serious birth defects. It is unknown if this medication is excreted in breast milk but breast feeding should be avoided. Drysol Pregnancy And Lactation Text: This medication is considered safe during pregnancy and breast feeding. Azathioprine Counseling:  I discussed with the patient the risks of azathioprine including but not limited to myelosuppression, immunosuppression, hepatotoxicity, lymphoma, and infections.  The patient understands that monitoring is required including baseline LFTs, Creatinine, possible TPMP genotyping and weekly CBCs for the first month and then every 2 weeks thereafter.  The patient verbalized understanding of the proper use and possible adverse effects of azathioprine.  All of the patient's questions and concerns were addressed. Tazorac Counseling:  Patient advised that medication is irritating and drying.  Patient may need to apply sparingly and wash off after an hour before eventually leaving it on overnight.  The patient verbalized understanding of the proper use and possible adverse effects of tazorac.  All of the patient's questions and concerns were addressed. Erivedge Pregnancy And Lactation Text: This medication is Pregnancy Category X and is absolutely contraindicated during pregnancy. It is unknown if it is excreted in breast milk. Rituxan Pregnancy And Lactation Text: This medication is Pregnancy Category C and it isn't know if it is safe during pregnancy. It is unknown if this medication is excreted in breast milk but similar antibodies are known to be excreted. Elidel Counseling: Patient may experience a mild burning sensation during topical application. Elidel is not approved in children less than 2 years of age. There have been case reports of hematologic and skin malignancies in patients using topical calcineurin inhibitors although causality is questionable. Itraconazole Counseling:  I discussed with the patient the risks of itraconazole including but not limited to liver damage, nausea/vomiting, neuropathy, and severe allergy.  The patient understands that this medication is best absorbed when taken with acidic beverages such as non-diet cola or ginger ale.  The patient understands that monitoring is required including baseline LFTs and repeat LFTs at intervals.  The patient understands that they are to contact us or the primary physician if concerning signs are noted. Azathioprine Pregnancy And Lactation Text: This medication is Pregnancy Category D and isn't considered safe during pregnancy. It is unknown if this medication is excreted in breast milk. Gabapentin Counseling: I discussed with the patient the risks of gabapentin including but not limited to dizziness, somnolence, fatigue and ataxia. Spironolactone Counseling: Patient advised regarding risks of diarrhea, abdominal pain, hyperkalemia, birth defects (for female patients), liver toxicity and renal toxicity. The patient may need blood work to monitor liver and kidney function and potassium levels while on therapy. The patient verbalized understanding of the proper use and possible adverse effects of spironolactone.  All of the patient's questions and concerns were addressed. Siliq Counseling:  I discussed with the patient the risks of Siliq including but not limited to new or worsening depression, suicidal thoughts and behavior, immunosuppression, malignancy, posterior leukoencephalopathy syndrome, and serious infections.  The patient understands that monitoring is required including a PPD at baseline and must alert us or the primary physician if symptoms of infection or other concerning signs are noted. There is also a special program designed to monitor depression which is required with Siliq. Tazorac Pregnancy And Lactation Text: This medication is not safe during pregnancy. It is unknown if this medication is excreted in breast milk. Itraconazole Pregnancy And Lactation Text: This medication is Pregnancy Category C and it isn't know if it is safe during pregnancy. It is also excreted in breast milk. Cellcept Counseling:  I discussed with the patient the risks of mycophenolate mofetil including but not limited to infection/immunosuppression, GI upset, hypokalemia, hypercholesterolemia, bone marrow suppression, lymphoproliferative disorders, malignancy, GI ulceration/bleed/perforation, colitis, interstitial lung disease, kidney failure, progressive multifocal leukoencephalopathy, and birth defects.  The patient understands that monitoring is required including a baseline creatinine and regular CBC testing. In addition, patient must alert us immediately if symptoms of infection or other concerning signs are noted. Siliq Pregnancy And Lactation Text: The risk during pregnancy and breastfeeding is uncertain with this medication. Eucrisa Counseling: Patient may experience a mild burning sensation during topical application. Eucrisa is not approved in children less than 2 years of age. Gabapentin Pregnancy And Lactation Text: This medication is Pregnancy Category C and isn't considered safe during pregnancy. It is excreted in breast milk. Spironolactone Pregnancy And Lactation Text: This medication can cause feminization of the male fetus and should be avoided during pregnancy. The active metabolite is also found in breast milk. Ketoconazole Counseling:   Patient counseled regarding improving absorption with orange juice.  Adverse effects include but are not limited to breast enlargement, headache, diarrhea, nausea, upset stomach, liver function test abnormalities, taste disturbance, and stomach pain.  There is a rare possibility of liver failure that can occur when taking ketoconazole. The patient understands that monitoring of LFTs may be required, especially at baseline. The patient verbalized understanding of the proper use and possible adverse effects of ketoconazole.  All of the patient's questions and concerns were addressed. Topical Clindamycin Counseling: Patient counseled that this medication may cause skin irritation or allergic reactions.  In the event of skin irritation, the patient was advised to reduce the amount of the drug applied or use it less frequently.   The patient verbalized understanding of the proper use and possible adverse effects of clindamycin.  All of the patient's questions and concerns were addressed. Metronidazole Pregnancy And Lactation Text: This medication is Pregnancy Category B and considered safe during pregnancy.  It is also excreted in breast milk. Simponi Counseling:  I discussed with the patient the risks of golimumab including but not limited to myelosuppression, immunosuppression, autoimmune hepatitis, demyelinating diseases, lymphoma, and serious infections.  The patient understands that monitoring is required including a PPD at baseline and must alert us or the primary physician if symptoms of infection or other concerning signs are noted. Glycopyrrolate Counseling:  I discussed with the patient the risks of glycopyrrolate including but not limited to skin rash, drowsiness, dry mouth, difficulty urinating, and blurred vision. Ketoconazole Pregnancy And Lactation Text: This medication is Pregnancy Category C and it isn't know if it is safe during pregnancy. It is also excreted in breast milk and breast feeding isn't recommended. Topical Clindamycin Pregnancy And Lactation Text: This medication is Pregnancy Category B and is considered safe during pregnancy. It is unknown if it is excreted in breast milk. SSKI Counseling:  I discussed with the patient the risks of SSKI including but not limited to thyroid abnormalities, metallic taste, GI upset, fever, headache, acne, arthralgias, paraesthesias, lymphadenopathy, easy bleeding, arrhythmias, and allergic reaction. Cimzia Counseling:  I discussed with the patient the risks of Cimzia including but not limited to immunosuppression, allergic reactions and infections.  The patient understands that monitoring is required including a PPD at baseline and must alert us or the primary physician if symptoms of infection or other concerning signs are noted. Eucrisa Pregnancy And Lactation Text: This medication has not been assigned a Pregnancy Risk Category but animal studies failed to show danger with the topical medication. It is unknown if the medication is excreted in breast milk. Bexarotene Counseling:  I discussed with the patient the risks of bexarotene including but not limited to hair loss, dry lips/skin/eyes, liver abnormalities, hyperlipidemia, pancreatitis, depression/suicidal ideation, photosensitivity, drug rash/allergic reactions, hypothyroidism, anemia, leukopenia, infection, cataracts, and teratogenicity.  Patient understands that they will need regular blood tests to check lipid profile, liver function tests, white blood cell count, thyroid function tests and pregnancy test if applicable. Cyclophosphamide Counseling:  I discussed with the patient the risks of cyclophosphamide including but not limited to hair loss, hormonal abnormalities, decreased fertility, abdominal pain, diarrhea, nausea and vomiting, bone marrow suppression and infection. The patient understands that monitoring is required while taking this medication. Minocycline Counseling: Patient advised regarding possible photosensitivity and discoloration of the teeth, skin, lips, tongue and gums.  Patient instructed to avoid sunlight, if possible.  When exposed to sunlight, patients should wear protective clothing, sunglasses, and sunscreen.  The patient was instructed to call the office immediately if the following severe adverse effects occur:  hearing changes, easy bruising/bleeding, severe headache, or vision changes.  The patient verbalized understanding of the proper use and possible adverse effects of minocycline.  All of the patient's questions and concerns were addressed. Glycopyrrolate Pregnancy And Lactation Text: This medication is Pregnancy Category B and is considered safe during pregnancy. It is unknown if it is excreted breast milk. Hydroquinone Counseling:  Patient advised that medication may result in skin irritation, lightening (hypopigmentation), dryness, and burning.  In the event of skin irritation, the patient was advised to reduce the amount of the drug applied or use it less frequently.  Rarely, spots that are treated with hydroquinone can become darker (pseudoochronosis).  Should this occur, patient instructed to stop medication and call the office. The patient verbalized understanding of the proper use and possible adverse effects of hydroquinone.  All of the patient's questions and concerns were addressed. Topical Sulfur Applications Counseling: Topical Sulfur Counseling: Patient counseled that this medication may cause skin irritation or allergic reactions.  In the event of skin irritation, the patient was advised to reduce the amount of the drug applied or use it less frequently.   The patient verbalized understanding of the proper use and possible adverse effects of topical sulfur application.  All of the patient's questions and concerns were addressed. Bexarotene Pregnancy And Lactation Text: This medication is Pregnancy Category X and should not be given to women who are pregnant or may become pregnant. This medication should not be used if you are breast feeding. Cimzia Pregnancy And Lactation Text: This medication crosses the placenta but can be considered safe in certain situations. Cimzia may be excreted in breast milk. Cyclophosphamide Pregnancy And Lactation Text: This medication is Pregnancy Category D and it isn't considered safe during pregnancy. This medication is excreted in breast milk. Sski Pregnancy And Lactation Text: This medication is Pregnancy Category D and isn't considered safe during pregnancy. It is excreted in breast milk. Terbinafine Counseling: Patient counseling regarding adverse effects of terbinafine including but not limited to headache, diarrhea, rash, upset stomach, liver function test abnormalities, itching, taste/smell disturbance, nausea, abdominal pain, and flatulence.  There is a rare possibility of liver failure that can occur when taking terbinafine.  The patient understands that a baseline LFT and kidney function test may be required. The patient verbalized understanding of the proper use and possible adverse effects of terbinafine.  All of the patient's questions and concerns were addressed. Minocycline Pregnancy And Lactation Text: This medication is Pregnancy Category D and not consider safe during pregnancy. It is also excreted in breast milk. Skyrizi Counseling: I discussed with the patient the risks of risankizumab-rzaa including but not limited to immunosuppression, and serious infections.  The patient understands that monitoring is required including a PPD at baseline and must alert us or the primary physician if symptoms of infection or other concerning signs are noted. Cosentyx Counseling:  I discussed with the patient the risks of Cosentyx including but not limited to worsening of Crohn's disease, immunosuppression, allergic reactions and infections.  The patient understands that monitoring is required including a PPD at baseline and must alert us or the primary physician if symptoms of infection or other concerning signs are noted. Hydroxychloroquine Counseling:  I discussed with the patient that a baseline ophthalmologic exam is needed at the start of therapy and every year thereafter while on therapy. A CBC may also be warranted for monitoring.  The side effects of this medication were discussed with the patient, including but not limited to agranulocytosis, aplastic anemia, seizures, rashes, retinopathy, and liver toxicity. Patient instructed to call the office should any adverse effect occur.  The patient verbalized understanding of the proper use and possible adverse effects of Plaquenil.  All the patient's questions and concerns were addressed. Topical Sulfur Applications Pregnancy And Lactation Text: This medication is Pregnancy Category C and has an unknown safety profile during pregnancy. It is unknown if this topical medication is excreted in breast milk. Thalidomide Counseling: I discussed with the patient the risks of thalidomide including but not limited to birth defects, anxiety, weakness, chest pain, dizziness, cough and severe allergy. Quinolones Counseling:  I discussed with the patient the risks of fluoroquinolones including but not limited to GI upset, allergic reaction, drug rash, diarrhea, dizziness, photosensitivity, yeast infections, liver function test abnormalities, tendonitis/tendon rupture. Azithromycin Counseling:  I discussed with the patient the risks of azithromycin including but not limited to GI upset, allergic reaction, drug rash, diarrhea, and yeast infections. Isotretinoin Counseling: Patient should get monthly blood tests, not donate blood, not drive at night if vision affected, not share medication, and not undergo elective surgery for 6 months after tx completed. Side effects reviewed, pt to contact office should one occur. Terbinafine Pregnancy And Lactation Text: This medication is Pregnancy Category B and is considered safe during pregnancy. It is also excreted in breast milk and breast feeding isn't recommended. Cyclosporine Counseling:  I discussed with the patient the risks of cyclosporine including but not limited to hypertension, gingival hyperplasia,myelosuppression, immunosuppression, liver damage, kidney damage, neurotoxicity, lymphoma, and serious infections. The patient understands that monitoring is required including baseline blood pressure, CBC, CMP, lipid panel and uric acid, and then 1-2 times monthly CMP and blood pressure. Zyclara Counseling:  I discussed with the patient the risks of imiquimod including but not limited to erythema, scaling, itching, weeping, crusting, and pain.  Patient understands that the inflammatory response to imiquimod is variable from person to person and was educated regarded proper titration schedule.  If flu-like symptoms develop, patient knows to discontinue the medication and contact us. Hydroxychloroquine Pregnancy And Lactation Text: This medication has been shown to cause fetal harm but it isn't assigned a Pregnancy Risk Category. There are small amounts excreted in breast milk. Cosentyx Pregnancy And Lactation Text: This medication is Pregnancy Category B and is considered safe during pregnancy. It is unknown if this medication is excreted in breast milk. Imiquimod Counseling:  I discussed with the patient the risks of imiquimod including but not limited to erythema, scaling, itching, weeping, crusting, and pain.  Patient understands that the inflammatory response to imiquimod is variable from person to person and was educated regarded proper titration schedule.  If flu-like symptoms develop, patient knows to discontinue the medication and contact us. Azithromycin Pregnancy And Lactation Text: This medication is considered safe during pregnancy and is also secreted in breast milk. Isotretinoin Pregnancy And Lactation Text: This medication is Pregnancy Category X and is considered extremely dangerous during pregnancy. It is unknown if it is excreted in breast milk. Stelara Counseling:  I discussed with the patient the risks of ustekinumab including but not limited to immunosuppression, malignancy, posterior leukoencephalopathy syndrome, and serious infections.  The patient understands that monitoring is required including a PPD at baseline and must alert us or the primary physician if symptoms of infection or other concerning signs are noted. Cyclosporine Pregnancy And Lactation Text: This medication is Pregnancy Category C and it isn't know if it is safe during pregnancy. This medication is excreted in breast milk. High Dose Vitamin A Counseling: Side effects reviewed, pt to contact office should one occur. Dupixent Counseling: I discussed with the patient the risks of dupilumab including but not limited to eye infection and irritation, cold sores, injection site reactions, worsening of asthma, allergic reactions and increased risk of parasitic infection.  Live vaccines should be avoided while taking dupilumab. Dupilumab will also interact with certain medications such as warfarin and cyclosporine. The patient understands that monitoring is required and they must alert us or the primary physician if symptoms of infection or other concerning signs are noted. Nsaids Counseling: NSAID Counseling: I discussed with the patient that NSAIDs should be taken with food. Prolonged use of NSAIDs can result in the development of stomach ulcers.  Patient advised to stop taking NSAIDs if abdominal pain occurs.  The patient verbalized understanding of the proper use and possible adverse effects of NSAIDs.  All of the patient's questions and concerns were addressed. Cimetidine Counseling:  I discussed with the patient the risks of Cimetidine including but not limited to gynecomastia, headache, diarrhea, nausea, drowsiness, arrhythmias, pancreatitis, skin rashes, psychosis, bone marrow suppression and kidney toxicity. Valtrex Counseling: I discussed with the patient the risks of valacyclovir including but not limited to kidney damage, nausea, vomiting and severe allergy.  The patient understands that if the infection seems to be worsening or is not improving, they are to call. Bactrim Counseling:  I discussed with the patient the risks of sulfa antibiotics including but not limited to GI upset, allergic reaction, drug rash, diarrhea, dizziness, photosensitivity, and yeast infections.  Rarely, more serious reactions can occur including but not limited to aplastic anemia, agranulocytosis, methemoglobinemia, blood dyscrasias, liver or kidney failure, lung infiltrates or desquamative/blistering drug rashes. Rifampin Counseling: I discussed with the patient the risks of rifampin including but not limited to liver damage, kidney damage, red-orange body fluids, nausea/vomiting and severe allergy. Methotrexate Counseling:  Patient counseled regarding adverse effects of methotrexate including but not limited to nausea, vomiting, abnormalities in liver function tests. Patients may develop mouth sores, rash, diarrhea, and abnormalities in blood counts. The patient understands that monitoring is required including LFT's and blood counts.  There is a rare possibility of scarring of the liver and lung problems that can occur when taking methotrexate. Persistent nausea, loss of appetite, pale stools, dark urine, cough, and shortness of breath should be reported immediately. Patient advised to discontinue methotrexate treatment at least three months before attempting to become pregnant.  I discussed the need for folate supplements while taking methotrexate.  These supplements can decrease side effects during methotrexate treatment. The patient verbalized understanding of the proper use and possible adverse effects of methotrexate.  All of the patient's questions and concerns were addressed. Dupixent Pregnancy And Lactation Text: This medication likely crosses the placenta but the risk for the fetus is uncertain. This medication is excreted in breast milk. Nsaids Pregnancy And Lactation Text: These medications are considered safe up to 30 weeks gestation. It is excreted in breast milk. Valtrex Pregnancy And Lactation Text: this medication is Pregnancy Category B and is considered safe during pregnancy. This medication is not directly found in breast milk but it's metabolite acyclovir is present. High Dose Vitamin A Pregnancy And Lactation Text: High dose vitamin A therapy is contraindicated during pregnancy and breast feeding. Minoxidil Counseling: Minoxidil is a topical medication which can increase blood flow where it is applied. It is uncertain how this medication increases hair growth. Side effects are uncommon and include stinging and allergic reactions. Methotrexate Pregnancy And Lactation Text: This medication is Pregnancy Category X and is known to cause fetal harm. This medication is excreted in breast milk. Rifampin Pregnancy And Lactation Text: This medication is Pregnancy Category C and it isn't know if it is safe during pregnancy. It is also excreted in breast milk and should not be used if you are breast feeding. Bactrim Pregnancy And Lactation Text: This medication is Pregnancy Category D and is known to cause fetal risk.  It is also excreted in breast milk. Odomzo Counseling- I discussed with the patient the risks of Odomzo including but not limited to nausea, vomiting, diarrhea, constipation, weight loss, changes in the sense of taste, decreased appetite, muscle spasms, and hair loss.  The patient verbalized understanding of the proper use and possible adverse effects of Odomzo.  All of the patient's questions and concerns were addressed. Taltz Counseling: I discussed with the patient the risks of ixekizumab including but not limited to immunosuppression, serious infections, worsening of inflammatory bowel disease and drug reactions.  The patient understands that monitoring is required including a PPD at baseline and must alert us or the primary physician if symptoms of infection or other concerning signs are noted. Enbrel Counseling:  I discussed with the patient the risks of etanercept including but not limited to myelosuppression, immunosuppression, autoimmune hepatitis, demyelinating diseases, lymphoma, and infections.  The patient understands that monitoring is required including a PPD at baseline and must alert us or the primary physician if symptoms of infection or other concerning signs are noted. Doxepin Counseling:  Patient advised that the medication is sedating and not to drive a car after taking this medication. Patient informed of potential adverse effects including but not limited to dry mouth, urinary retention, and blurry vision.  The patient verbalized understanding of the proper use and possible adverse effects of doxepin.  All of the patient's questions and concerns were addressed. Arava Counseling:  Patient counseled regarding adverse effects of Arava including but not limited to nausea, vomiting, abnormalities in liver function tests. Patients may develop mouth sores, rash, diarrhea, and abnormalities in blood counts. The patient understands that monitoring is required including LFTs and blood counts.  There is a rare possibility of scarring of the liver and lung problems that can occur when taking methotrexate. Persistent nausea, loss of appetite, pale stools, dark urine, cough, and shortness of breath should be reported immediately. Patient advised to discontinue Arava treatment and consult with a physician prior to attempting conception. The patient will have to undergo a treatment to eliminate Arava from the body prior to conception. Cephalexin Counseling: I counseled the patient regarding use of cephalexin as an antibiotic for prophylactic and/or therapeutic purposes. Cephalexin (commonly prescribed under brand name Keflex) is a cephalosporin antibiotic which is active against numerous classes of bacteria, including most skin bacteria. Side effects may include nausea, diarrhea, gastrointestinal upset, rash, hives, yeast infections, and in rare cases, hepatitis, kidney disease, seizures, fever, confusion, neurologic symptoms, and others. Patients with severe allergies to penicillin medications are cautioned that there is about a 10% incidence of cross-reactivity with cephalosporins. When possible, patients with penicillin allergies should use alternatives to cephalosporins for antibiotic therapy. Sarecycline Counseling: Patient advised regarding possible photosensitivity and discoloration of the teeth, skin, lips, tongue and gums.  Patient instructed to avoid sunlight, if possible.  When exposed to sunlight, patients should wear protective clothing, sunglasses, and sunscreen.  The patient was instructed to call the office immediately if the following severe adverse effects occur:  hearing changes, easy bruising/bleeding, severe headache, or vision changes.  The patient verbalized understanding of the proper use and possible adverse effects of sarecycline.  All of the patient's questions and concerns were addressed. Prednisone Counseling:  I discussed with the patient the risks of prolonged use of prednisone including but not limited to weight gain, insomnia, osteoporosis, mood changes, diabetes, susceptibility to infection, glaucoma and high blood pressure.  In cases where prednisone use is prolonged, patients should be monitored with blood pressure checks, serum glucose levels and an eye exam.  Additionally, the patient may need to be placed on GI prophylaxis, PCP prophylaxis, and calcium and vitamin D supplementation and/or a bisphosphonate.  The patient verbalized understanding of the proper use and the possible adverse effects of prednisone.  All of the patient's questions and concerns were addressed. Use Enhanced Medication Counseling?: No Doxepin Pregnancy And Lactation Text: This medication is Pregnancy Category C and it isn't known if it is safe during pregnancy. It is also excreted in breast milk and breast feeding isn't recommended. Picato Counseling:  I discussed with the patient the risks of Picato including but not limited to erythema, scaling, itching, weeping, crusting, and pain. Cephalexin Pregnancy And Lactation Text: This medication is Pregnancy Category B and considered safe during pregnancy.  It is also excreted in breast milk but can be used safely for shorter doses. Benzoyl Peroxide Counseling: Patient counseled that medicine may cause skin irritation and bleach clothing.  In the event of skin irritation, the patient was advised to reduce the amount of the drug applied or use it less frequently.   The patient verbalized understanding of the proper use and possible adverse effects of benzoyl peroxide.  All of the patient's questions and concerns were addressed. Otezla Counseling: The side effects of Otezla were discussed with the patient, including but not limited to worsening or new depression, weight loss, diarrhea, nausea, upper respiratory tract infection, and headache. Patient instructed to call the office should any adverse effect occur.  The patient verbalized understanding of the proper use and possible adverse effects of Otezla.  All the patient's questions and concerns were addressed. Tremfya Counseling: I discussed with the patient the risks of guselkumab including but not limited to immunosuppression, serious infections, worsening of inflammatory bowel disease and drug reactions.  The patient understands that monitoring is required including a PPD at baseline and must alert us or the primary physician if symptoms of infection or other concerning signs are noted. Clofazimine Counseling:  I discussed with the patient the risks of clofazimine including but not limited to skin and eye pigmentation, liver damage, nausea/vomiting, gastrointestinal bleeding and allergy. Humira Counseling:  I discussed with the patient the risks of adalimumab including but not limited to myelosuppression, immunosuppression, autoimmune hepatitis, demyelinating diseases, lymphoma, and serious infections.  The patient understands that monitoring is required including a PPD at baseline and must alert us or the primary physician if symptoms of infection or other concerning signs are noted. Hydroxyzine Counseling: Patient advised that the medication is sedating and not to drive a car after taking this medication.  Patient informed of potential adverse effects including but not limited to dry mouth, urinary retention, and blurry vision.  The patient verbalized understanding of the proper use and possible adverse effects of hydroxyzine.  All of the patient's questions and concerns were addressed. Benzoyl Peroxide Pregnancy And Lactation Text: This medication is Pregnancy Category C. It is unknown if benzoyl peroxide is excreted in breast milk. Tetracycline Counseling: Patient counseled regarding possible photosensitivity and increased risk for sunburn.  Patient instructed to avoid sunlight, if possible.  When exposed to sunlight, patients should wear protective clothing, sunglasses, and sunscreen.  The patient was instructed to call the office immediately if the following severe adverse effects occur:  hearing changes, easy bruising/bleeding, severe headache, or vision changes.  The patient verbalized understanding of the proper use and possible adverse effects of tetracycline.  All of the patient's questions and concerns were addressed. Patient understands to avoid pregnancy while on therapy due to potential birth defects. Clindamycin Counseling: I counseled the patient regarding use of clindamycin as an antibiotic for prophylactic and/or therapeutic purposes. Clindamycin is active against numerous classes of bacteria, including skin bacteria. Side effects may include nausea, diarrhea, gastrointestinal upset, rash, hives, yeast infections, and in rare cases, colitis. Detail Level: Zone Protopic Counseling: Patient may experience a mild burning sensation during topical application. Protopic is not approved in children less than 2 years of age. There have been case reports of hematologic and skin malignancies in patients using topical calcineurin inhibitors although causality is questionable. Carac Counseling:  I discussed with the patient the risks of Carac including but not limited to erythema, scaling, itching, weeping, crusting, and pain. Otezla Pregnancy And Lactation Text: This medication is Pregnancy Category C and it isn't known if it is safe during pregnancy. It is unknown if it is excreted in breast milk. Hydroxyzine Pregnancy And Lactation Text: This medication is not safe during pregnancy and should not be taken. It is also excreted in breast milk and breast feeding isn't recommended. Clindamycin Pregnancy And Lactation Text: This medication can be used in pregnancy if certain situations. Clindamycin is also present in breast milk. Xeljanz Counseling: I discussed with the patient the risks of Xeljanz therapy including increased risk of infection, liver issues, headache, diarrhea, or cold symptoms. Live vaccines should be avoided. They were instructed to call if they have any problems. Ilumya Counseling: I discussed with the patient the risks of tildrakizumab including but not limited to immunosuppression, malignancy, posterior leukoencephalopathy syndrome, and serious infections.  The patient understands that monitoring is required including a PPD at baseline and must alert us or the primary physician if symptoms of infection or other concerning signs are noted. Oxybutynin Counseling:  I discussed with the patient the risks of oxybutynin including but not limited to skin rash, drowsiness, dry mouth, difficulty urinating, and blurred vision. Colchicine Counseling:  Patient counseled regarding adverse effects including but not limited to stomach upset (nausea, vomiting, stomach pain, or diarrhea).  Patient instructed to limit alcohol consumption while taking this medication.  Colchicine may reduce blood counts especially with prolonged use.  The patient understands that monitoring of kidney function and blood counts may be required, especially at baseline. The patient verbalized understanding of the proper use and possible adverse effects of colchicine.  All of the patient's questions and concerns were addressed. Protopic Pregnancy And Lactation Text: This medication is Pregnancy Category C. It is unknown if this medication is excreted in breast milk when applied topically. Doxycycline Counseling:  Patient counseled regarding possible photosensitivity and increased risk for sunburn.  Patient instructed to avoid sunlight, if possible.  When exposed to sunlight, patients should wear protective clothing, sunglasses, and sunscreen.  The patient was instructed to call the office immediately if the following severe adverse effects occur:  hearing changes, easy bruising/bleeding, severe headache, or vision changes.  The patient verbalized understanding of the proper use and possible adverse effects of doxycycline.  All of the patient's questions and concerns were addressed. Carac Pregnancy And Lactation Text: This medication is Pregnancy Category X and contraindicated in pregnancy and in women who may become pregnant. It is unknown if this medication is excreted in breast milk. Xelalphonsoz Pregnancy And Lactation Text: This medication is Pregnancy Category D and is not considered safe during pregnancy.  The risk during breast feeding is also uncertain. 5-Fu Counseling: 5-Fluorouracil Counseling:  I discussed with the patient the risks of 5-fluorouracil including but not limited to erythema, scaling, itching, weeping, crusting, and pain. Solaraze Counseling:  I discussed with the patient the risks of Solaraze including but not limited to erythema, scaling, itching, weeping, crusting, and pain. Doxycycline Pregnancy And Lactation Text: This medication is Pregnancy Category D and not consider safe during pregnancy. It is also excreted in breast milk but is considered safe for shorter treatment courses. Acitretin Counseling:  I discussed with the patient the risks of acitretin including but not limited to hair loss, dry lips/skin/eyes, liver damage, hyperlipidemia, depression/suicidal ideation, photosensitivity.  Serious rare side effects can include but are not limited to pancreatitis, pseudotumor cerebri, bony changes, clot formation/stroke/heart attack.  Patient understands that alcohol is contraindicated since it can result in liver toxicity and significantly prolong the elimination of the drug by many years. Albendazole Counseling:  I discussed with the patient the risks of albendazole including but not limited to cytopenia, kidney damage, nausea/vomiting and severe allergy.  The patient understands that this medication is being used in an off-label manner. Xolair Counseling:  Patient informed of potential adverse effects including but not limited to fever, muscle aches, rash and allergic reactions.  The patient verbalized understanding of the proper use and possible adverse effects of Xolair.  All of the patient's questions and concerns were addressed. Fluconazole Counseling:  Patient counseled regarding adverse effects of fluconazole including but not limited to headache, diarrhea, nausea, upset stomach, liver function test abnormalities, taste disturbance, and stomach pain.  There is a rare possibility of liver failure that can occur when taking fluconazole.  The patient understands that monitoring of LFTs and kidney function test may be required, especially at baseline. The patient verbalized understanding of the proper use and possible adverse effects of fluconazole.  All of the patient's questions and concerns were addressed. Infliximab Counseling:  I discussed with the patient the risks of infliximab including but not limited to myelosuppression, immunosuppression, autoimmune hepatitis, demyelinating diseases, lymphoma, and serious infections.  The patient understands that monitoring is required including a PPD at baseline and must alert us or the primary physician if symptoms of infection or other concerning signs are noted. Birth Control Pills Counseling: Birth Control Pill Counseling: I discussed with the patient the potential side effects of OCPs including but not limited to increased risk of stroke, heart attack, thrombophlebitis, deep venous thrombosis, hepatic adenomas, breast changes, GI upset, headaches, and depression.  The patient verbalized understanding of the proper use and possible adverse effects of OCPs. All of the patient's questions and concerns were addressed. Solaraze Pregnancy And Lactation Text: This medication is Pregnancy Category B and is considered safe. There is some data to suggest avoiding during the third trimester. It is unknown if this medication is excreted in breast milk. Dapsone Counseling: I discussed with the patient the risks of dapsone including but not limited to hemolytic anemia, agranulocytosis, rashes, methemoglobinemia, kidney failure, peripheral neuropathy, headaches, GI upset, and liver toxicity.  Patients who start dapsone require monitoring including baseline LFTs and weekly CBCs for the first month, then every month thereafter.  The patient verbalized understanding of the proper use and possible adverse effects of dapsone.  All of the patient's questions and concerns were addressed. Acitretin Pregnancy And Lactation Text: This medication is Pregnancy Category X and should not be given to women who are pregnant or may become pregnant in the future. This medication is excreted in breast milk. Erythromycin Counseling:  I discussed with the patient the risks of erythromycin including but not limited to GI upset, allergic reaction, drug rash, diarrhea, increase in liver enzymes, and yeast infections. Birth Control Pills Pregnancy And Lactation Text: This medication should be avoided if pregnant and for the first 30 days post-partum. Xolair Pregnancy And Lactation Text: This medication is Pregnancy Category B and is considered safe during pregnancy. This medication is excreted in breast milk. Dapsone Pregnancy And Lactation Text: This medication is Pregnancy Category C and is not considered safe during pregnancy or breast feeding. Drysol Counseling:  I discussed with the patient the risks of drysol/aluminum chloride including but not limited to skin rash, itching, irritation, burning. Topical Retinoid counseling:  Patient advised to apply a pea-sized amount only at bedtime and wait 30 minutes after washing their face before applying.  If too drying, patient may add a non-comedogenic moisturizer. The patient verbalized understanding of the proper use and possible adverse effects of retinoids.  All of the patient's questions and concerns were addressed. Ivermectin Counseling:  Patient instructed to take medication on an empty stomach with a full glass of water.  Patient informed of potential adverse effects including but not limited to nausea, diarrhea, dizziness, itching, and swelling of the extremities or lymph nodes.  The patient verbalized understanding of the proper use and possible adverse effects of ivermectin.  All of the patient's questions and concerns were addressed. Erythromycin Pregnancy And Lactation Text: This medication is Pregnancy Category B and is considered safe during pregnancy. It is also excreted in breast milk. Griseofulvin Counseling:  I discussed with the patient the risks of griseofulvin including but not limited to photosensitivity, cytopenia, liver damage, nausea/vomiting and severe allergy.  The patient understands that this medication is best absorbed when taken with a fatty meal (e.g., ice cream or french fries). Rituxan Counseling:  I discussed with the patient the risks of Rituxan infusions. Side effects can include infusion reactions, severe drug rashes including mucocutaneous reactions, reactivation of latent hepatitis and other infections and rarely progressive multifocal leukoencephalopathy.  All of the patient's questions and concerns were addressed.

## 2021-05-23 ENCOUNTER — HOSPITAL ENCOUNTER (OUTPATIENT)
Facility: HOSPITAL | Age: 71
Discharge: HOME HEALTH | End: 2021-05-25
Attending: EMERGENCY MEDICINE | Admitting: EMERGENCY MEDICINE
Payer: MEDICARE

## 2021-05-23 DIAGNOSIS — N17.9 ACUTE KIDNEY INJURY: ICD-10-CM

## 2021-05-23 DIAGNOSIS — E87.6 HYPOKALEMIA: ICD-10-CM

## 2021-05-23 DIAGNOSIS — E86.0 DEHYDRATION: ICD-10-CM

## 2021-05-23 DIAGNOSIS — S22.31XA CLOSED FRACTURE OF ONE RIB OF RIGHT SIDE, INITIAL ENCOUNTER: ICD-10-CM

## 2021-05-23 DIAGNOSIS — N18.4 CKD (CHRONIC KIDNEY DISEASE), STAGE IV: ICD-10-CM

## 2021-05-23 DIAGNOSIS — R07.89 CHEST WALL PAIN: ICD-10-CM

## 2021-05-23 DIAGNOSIS — S22.31XA FRACTURE OF ONE RIB, RIGHT SIDE, INITIAL ENCOUNTER FOR CLOSED FRACTURE: Primary | ICD-10-CM

## 2021-05-23 DIAGNOSIS — R55 SYNCOPE: ICD-10-CM

## 2021-05-23 DIAGNOSIS — N17.9 AKI (ACUTE KIDNEY INJURY): ICD-10-CM

## 2021-05-23 LAB
ALBUMIN SERPL BCP-MCNC: 3.8 G/DL (ref 3.5–5.2)
ALP SERPL-CCNC: 90 U/L (ref 55–135)
ALT SERPL W/O P-5'-P-CCNC: 12 U/L (ref 10–44)
ANION GAP SERPL CALC-SCNC: 12 MMOL/L (ref 8–16)
AST SERPL-CCNC: 15 U/L (ref 10–40)
BASOPHILS # BLD AUTO: 0.09 K/UL (ref 0–0.2)
BASOPHILS NFR BLD: 1.1 % (ref 0–1.9)
BILIRUB SERPL-MCNC: 0.7 MG/DL (ref 0.1–1)
BNP SERPL-MCNC: 346 PG/ML (ref 0–99)
BUN SERPL-MCNC: 41 MG/DL (ref 8–23)
CALCIUM SERPL-MCNC: 9.7 MG/DL (ref 8.7–10.5)
CHLORIDE SERPL-SCNC: 92 MMOL/L (ref 95–110)
CO2 SERPL-SCNC: 32 MMOL/L (ref 23–29)
CREAT SERPL-MCNC: 3 MG/DL (ref 0.5–1.4)
D DIMER PPP IA.FEU-MCNC: 0.56 MG/L FEU
DIFFERENTIAL METHOD: ABNORMAL
EOSINOPHIL # BLD AUTO: 0.1 K/UL (ref 0–0.5)
EOSINOPHIL NFR BLD: 1.6 % (ref 0–8)
ERYTHROCYTE [DISTWIDTH] IN BLOOD BY AUTOMATED COUNT: 14.7 % (ref 11.5–14.5)
EST. GFR  (AFRICAN AMERICAN): 23 ML/MIN/1.73 M^2
EST. GFR  (NON AFRICAN AMERICAN): 20 ML/MIN/1.73 M^2
GLUCOSE SERPL-MCNC: 151 MG/DL (ref 70–110)
HCT VFR BLD AUTO: 41.7 % (ref 40–54)
HGB BLD-MCNC: 14.2 G/DL (ref 14–18)
IMM GRANULOCYTES # BLD AUTO: 0.01 K/UL (ref 0–0.04)
IMM GRANULOCYTES NFR BLD AUTO: 0.1 % (ref 0–0.5)
LYMPHOCYTES # BLD AUTO: 1.3 K/UL (ref 1–4.8)
LYMPHOCYTES NFR BLD: 15.1 % (ref 18–48)
MAGNESIUM SERPL-MCNC: 1.9 MG/DL (ref 1.6–2.6)
MCH RBC QN AUTO: 27.2 PG (ref 27–31)
MCHC RBC AUTO-ENTMCNC: 34.1 G/DL (ref 32–36)
MCV RBC AUTO: 80 FL (ref 82–98)
MONOCYTES # BLD AUTO: 1.2 K/UL (ref 0.3–1)
MONOCYTES NFR BLD: 14.1 % (ref 4–15)
NEUTROPHILS # BLD AUTO: 5.8 K/UL (ref 1.8–7.7)
NEUTROPHILS NFR BLD: 68 % (ref 38–73)
NRBC BLD-RTO: 0 /100 WBC
PLATELET # BLD AUTO: 254 K/UL (ref 150–450)
PMV BLD AUTO: 8.8 FL (ref 9.2–12.9)
POCT GLUCOSE: 182 MG/DL (ref 70–110)
POTASSIUM SERPL-SCNC: 2.9 MMOL/L (ref 3.5–5.1)
PROT SERPL-MCNC: 8.3 G/DL (ref 6–8.4)
RBC # BLD AUTO: 5.22 M/UL (ref 4.6–6.2)
SODIUM SERPL-SCNC: 136 MMOL/L (ref 136–145)
TROPONIN I SERPL DL<=0.01 NG/ML-MCNC: 0.03 NG/ML (ref 0–0.03)
WBC # BLD AUTO: 8.52 K/UL (ref 3.9–12.7)

## 2021-05-23 PROCEDURE — 83880 ASSAY OF NATRIURETIC PEPTIDE: CPT | Performed by: EMERGENCY MEDICINE

## 2021-05-23 PROCEDURE — 25000003 PHARM REV CODE 250: Performed by: EMERGENCY MEDICINE

## 2021-05-23 PROCEDURE — 85025 COMPLETE CBC W/AUTO DIFF WBC: CPT | Performed by: EMERGENCY MEDICINE

## 2021-05-23 PROCEDURE — 82962 GLUCOSE BLOOD TEST: CPT

## 2021-05-23 PROCEDURE — 84443 ASSAY THYROID STIM HORMONE: CPT | Performed by: EMERGENCY MEDICINE

## 2021-05-23 PROCEDURE — 93005 ELECTROCARDIOGRAM TRACING: CPT

## 2021-05-23 PROCEDURE — 84484 ASSAY OF TROPONIN QUANT: CPT | Performed by: EMERGENCY MEDICINE

## 2021-05-23 PROCEDURE — 84100 ASSAY OF PHOSPHORUS: CPT | Performed by: EMERGENCY MEDICINE

## 2021-05-23 PROCEDURE — 80061 LIPID PANEL: CPT | Performed by: EMERGENCY MEDICINE

## 2021-05-23 PROCEDURE — 85379 FIBRIN DEGRADATION QUANT: CPT | Performed by: EMERGENCY MEDICINE

## 2021-05-23 PROCEDURE — 84439 ASSAY OF FREE THYROXINE: CPT | Performed by: EMERGENCY MEDICINE

## 2021-05-23 PROCEDURE — 83735 ASSAY OF MAGNESIUM: CPT | Performed by: EMERGENCY MEDICINE

## 2021-05-23 PROCEDURE — 96361 HYDRATE IV INFUSION ADD-ON: CPT

## 2021-05-23 PROCEDURE — 93010 EKG 12-LEAD: ICD-10-PCS | Mod: ,,, | Performed by: INTERNAL MEDICINE

## 2021-05-23 PROCEDURE — 93010 ELECTROCARDIOGRAM REPORT: CPT | Mod: ,,, | Performed by: INTERNAL MEDICINE

## 2021-05-23 PROCEDURE — 80053 COMPREHEN METABOLIC PANEL: CPT | Performed by: EMERGENCY MEDICINE

## 2021-05-23 PROCEDURE — 99285 EMERGENCY DEPT VISIT HI MDM: CPT | Mod: 25

## 2021-05-23 PROCEDURE — U0002 COVID-19 LAB TEST NON-CDC: HCPCS | Performed by: EMERGENCY MEDICINE

## 2021-05-23 RX ORDER — HYDROCODONE BITARTRATE AND ACETAMINOPHEN 5; 325 MG/1; MG/1
1 TABLET ORAL
Status: COMPLETED | OUTPATIENT
Start: 2021-05-23 | End: 2021-05-23

## 2021-05-23 RX ADMIN — POTASSIUM BICARBONATE 50 MEQ: 978 TABLET, EFFERVESCENT ORAL at 11:05

## 2021-05-23 RX ADMIN — HYDROCODONE BITARTRATE AND ACETAMINOPHEN 1 TABLET: 5; 325 TABLET ORAL at 11:05

## 2021-05-23 RX ADMIN — SODIUM CHLORIDE 500 ML: 0.9 INJECTION, SOLUTION INTRAVENOUS at 11:05

## 2021-05-24 PROBLEM — R55 SYNCOPE: Status: ACTIVE | Noted: 2021-05-24

## 2021-05-24 PROBLEM — S22.31XA FRACTURE OF ONE RIB, RIGHT SIDE, INITIAL ENCOUNTER FOR CLOSED FRACTURE: Status: ACTIVE | Noted: 2021-05-24

## 2021-05-24 PROBLEM — I50.42 CHRONIC COMBINED SYSTOLIC AND DIASTOLIC CONGESTIVE HEART FAILURE: Status: RESOLVED | Noted: 2020-11-24 | Resolved: 2021-05-24

## 2021-05-24 PROBLEM — N17.9 AKI (ACUTE KIDNEY INJURY): Status: ACTIVE | Noted: 2021-05-24

## 2021-05-24 LAB
ANION GAP SERPL CALC-SCNC: 10 MMOL/L (ref 8–16)
ANION GAP SERPL CALC-SCNC: 13 MMOL/L (ref 8–16)
BACTERIA #/AREA URNS HPF: ABNORMAL /HPF
BASOPHILS # BLD AUTO: 0.1 K/UL (ref 0–0.2)
BASOPHILS NFR BLD: 1.3 % (ref 0–1.9)
BILIRUB UR QL STRIP: NEGATIVE
BUN SERPL-MCNC: 39 MG/DL (ref 8–23)
BUN SERPL-MCNC: 42 MG/DL (ref 8–23)
CALCIUM SERPL-MCNC: 9 MG/DL (ref 8.7–10.5)
CALCIUM SERPL-MCNC: 9.4 MG/DL (ref 8.7–10.5)
CHLORIDE SERPL-SCNC: 96 MMOL/L (ref 95–110)
CHLORIDE SERPL-SCNC: 96 MMOL/L (ref 95–110)
CHOLEST SERPL-MCNC: 123 MG/DL (ref 120–199)
CHOLEST/HDLC SERPL: 4 {RATIO} (ref 2–5)
CLARITY UR: CLEAR
CO2 SERPL-SCNC: 27 MMOL/L (ref 23–29)
CO2 SERPL-SCNC: 30 MMOL/L (ref 23–29)
COLOR UR: YELLOW
CREAT SERPL-MCNC: 2.5 MG/DL (ref 0.5–1.4)
CREAT SERPL-MCNC: 2.5 MG/DL (ref 0.5–1.4)
CTP QC/QA: YES
DIFFERENTIAL METHOD: ABNORMAL
EOSINOPHIL # BLD AUTO: 0.2 K/UL (ref 0–0.5)
EOSINOPHIL NFR BLD: 2.4 % (ref 0–8)
ERYTHROCYTE [DISTWIDTH] IN BLOOD BY AUTOMATED COUNT: 14.7 % (ref 11.5–14.5)
EST. GFR  (AFRICAN AMERICAN): 29 ML/MIN/1.73 M^2
EST. GFR  (AFRICAN AMERICAN): 29 ML/MIN/1.73 M^2
EST. GFR  (NON AFRICAN AMERICAN): 25 ML/MIN/1.73 M^2
EST. GFR  (NON AFRICAN AMERICAN): 25 ML/MIN/1.73 M^2
GLUCOSE SERPL-MCNC: 140 MG/DL (ref 70–110)
GLUCOSE SERPL-MCNC: 185 MG/DL (ref 70–110)
GLUCOSE UR QL STRIP: ABNORMAL
HCT VFR BLD AUTO: 40 % (ref 40–54)
HDLC SERPL-MCNC: 31 MG/DL (ref 40–75)
HDLC SERPL: 25.2 % (ref 20–50)
HGB BLD-MCNC: 13.5 G/DL (ref 14–18)
HGB UR QL STRIP: ABNORMAL
HYALINE CASTS #/AREA URNS LPF: 5 /LPF
IMM GRANULOCYTES # BLD AUTO: 0.09 K/UL (ref 0–0.04)
IMM GRANULOCYTES NFR BLD AUTO: 1.2 % (ref 0–0.5)
KETONES UR QL STRIP: NEGATIVE
LDLC SERPL CALC-MCNC: 47.2 MG/DL (ref 63–159)
LEUKOCYTE ESTERASE UR QL STRIP: ABNORMAL
LYMPHOCYTES # BLD AUTO: 1.4 K/UL (ref 1–4.8)
LYMPHOCYTES NFR BLD: 18.2 % (ref 18–48)
MCH RBC QN AUTO: 26.8 PG (ref 27–31)
MCHC RBC AUTO-ENTMCNC: 33.8 G/DL (ref 32–36)
MCV RBC AUTO: 80 FL (ref 82–98)
MICROSCOPIC COMMENT: ABNORMAL
MONOCYTES # BLD AUTO: 1.1 K/UL (ref 0.3–1)
MONOCYTES NFR BLD: 14.1 % (ref 4–15)
NEUTROPHILS # BLD AUTO: 4.7 K/UL (ref 1.8–7.7)
NEUTROPHILS NFR BLD: 62.8 % (ref 38–73)
NITRITE UR QL STRIP: NEGATIVE
NONHDLC SERPL-MCNC: 92 MG/DL
NRBC BLD-RTO: 0 /100 WBC
PH UR STRIP: 6 [PH] (ref 5–8)
PHOSPHATE SERPL-MCNC: 3.8 MG/DL (ref 2.7–4.5)
PLATELET # BLD AUTO: ABNORMAL K/UL (ref 150–450)
PLATELET BLD QL SMEAR: ABNORMAL
PMV BLD AUTO: ABNORMAL FL (ref 9.2–12.9)
POCT GLUCOSE: 137 MG/DL (ref 70–110)
POCT GLUCOSE: 146 MG/DL (ref 70–110)
POCT GLUCOSE: 154 MG/DL (ref 70–110)
POCT GLUCOSE: 176 MG/DL (ref 70–110)
POCT GLUCOSE: 224 MG/DL (ref 70–110)
POTASSIUM SERPL-SCNC: 3.2 MMOL/L (ref 3.5–5.1)
POTASSIUM SERPL-SCNC: 3.4 MMOL/L (ref 3.5–5.1)
PROT UR QL STRIP: ABNORMAL
RBC # BLD AUTO: 5.03 M/UL (ref 4.6–6.2)
RBC #/AREA URNS HPF: 17 /HPF (ref 0–4)
SARS-COV-2 RDRP RESP QL NAA+PROBE: NEGATIVE
SODIUM SERPL-SCNC: 136 MMOL/L (ref 136–145)
SODIUM SERPL-SCNC: 136 MMOL/L (ref 136–145)
SP GR UR STRIP: 1.02 (ref 1–1.03)
T4 FREE SERPL-MCNC: 1.25 NG/DL (ref 0.71–1.51)
TRIGL SERPL-MCNC: 224 MG/DL (ref 30–150)
TROPONIN I SERPL DL<=0.01 NG/ML-MCNC: 0.01 NG/ML (ref 0–0.03)
TROPONIN I SERPL DL<=0.01 NG/ML-MCNC: 0.03 NG/ML (ref 0–0.03)
TSH SERPL DL<=0.005 MIU/L-ACNC: 4.97 UIU/ML (ref 0.4–4)
URN SPEC COLLECT METH UR: ABNORMAL
UROBILINOGEN UR STRIP-ACNC: NEGATIVE EU/DL
WBC # BLD AUTO: 7.47 K/UL (ref 3.9–12.7)
WBC #/AREA URNS HPF: 26 /HPF (ref 0–5)

## 2021-05-24 PROCEDURE — 87088 URINE BACTERIA CULTURE: CPT | Performed by: HOSPITALIST

## 2021-05-24 PROCEDURE — 63600175 PHARM REV CODE 636 W HCPCS: Performed by: HOSPITALIST

## 2021-05-24 PROCEDURE — 87077 CULTURE AEROBIC IDENTIFY: CPT | Performed by: HOSPITALIST

## 2021-05-24 PROCEDURE — 96376 TX/PRO/DX INJ SAME DRUG ADON: CPT | Performed by: EMERGENCY MEDICINE

## 2021-05-24 PROCEDURE — 84484 ASSAY OF TROPONIN QUANT: CPT | Performed by: HOSPITALIST

## 2021-05-24 PROCEDURE — 51798 US URINE CAPACITY MEASURE: CPT

## 2021-05-24 PROCEDURE — 87086 URINE CULTURE/COLONY COUNT: CPT | Performed by: HOSPITALIST

## 2021-05-24 PROCEDURE — 25000003 PHARM REV CODE 250: Performed by: INTERNAL MEDICINE

## 2021-05-24 PROCEDURE — G0378 HOSPITAL OBSERVATION PER HR: HCPCS

## 2021-05-24 PROCEDURE — 80048 BASIC METABOLIC PNL TOTAL CA: CPT | Performed by: HOSPITALIST

## 2021-05-24 PROCEDURE — 97161 PT EVAL LOW COMPLEX 20 MIN: CPT

## 2021-05-24 PROCEDURE — 36415 COLL VENOUS BLD VENIPUNCTURE: CPT | Performed by: HOSPITALIST

## 2021-05-24 PROCEDURE — 87186 SC STD MICRODIL/AGAR DIL: CPT | Performed by: HOSPITALIST

## 2021-05-24 PROCEDURE — 97116 GAIT TRAINING THERAPY: CPT

## 2021-05-24 PROCEDURE — 80048 BASIC METABOLIC PNL TOTAL CA: CPT | Mod: 91 | Performed by: HOSPITALIST

## 2021-05-24 PROCEDURE — 94799 UNLISTED PULMONARY SVC/PX: CPT

## 2021-05-24 PROCEDURE — 25000003 PHARM REV CODE 250: Performed by: HOSPITALIST

## 2021-05-24 PROCEDURE — 99220 PR INITIAL OBSERVATION CARE,LEVL III: ICD-10-PCS | Mod: ,,, | Performed by: INTERNAL MEDICINE

## 2021-05-24 PROCEDURE — C9399 UNCLASSIFIED DRUGS OR BIOLOG: HCPCS | Performed by: HOSPITALIST

## 2021-05-24 PROCEDURE — 99220 PR INITIAL OBSERVATION CARE,LEVL III: CPT | Mod: ,,, | Performed by: INTERNAL MEDICINE

## 2021-05-24 PROCEDURE — 96375 TX/PRO/DX INJ NEW DRUG ADDON: CPT | Performed by: EMERGENCY MEDICINE

## 2021-05-24 PROCEDURE — 96372 THER/PROPH/DIAG INJ SC/IM: CPT | Mod: 59 | Performed by: EMERGENCY MEDICINE

## 2021-05-24 PROCEDURE — 85025 COMPLETE CBC W/AUTO DIFF WBC: CPT | Performed by: HOSPITALIST

## 2021-05-24 PROCEDURE — 81000 URINALYSIS NONAUTO W/SCOPE: CPT | Performed by: HOSPITALIST

## 2021-05-24 PROCEDURE — 94760 N-INVAS EAR/PLS OXIMETRY 1: CPT

## 2021-05-24 PROCEDURE — 97165 OT EVAL LOW COMPLEX 30 MIN: CPT

## 2021-05-24 RX ORDER — SODIUM CHLORIDE 0.9 % (FLUSH) 0.9 %
10 SYRINGE (ML) INJECTION
Status: DISCONTINUED | OUTPATIENT
Start: 2021-05-24 | End: 2021-05-25 | Stop reason: HOSPADM

## 2021-05-24 RX ORDER — GLUCAGON 1 MG
1 KIT INJECTION
Status: DISCONTINUED | OUTPATIENT
Start: 2021-05-24 | End: 2021-05-25 | Stop reason: HOSPADM

## 2021-05-24 RX ORDER — GUAIFENESIN/DEXTROMETHORPHAN 100-10MG/5
10 SYRUP ORAL EVERY 4 HOURS PRN
Status: DISCONTINUED | OUTPATIENT
Start: 2021-05-24 | End: 2021-05-25 | Stop reason: HOSPADM

## 2021-05-24 RX ORDER — SODIUM CHLORIDE 9 MG/ML
INJECTION, SOLUTION INTRAVENOUS CONTINUOUS
Status: DISCONTINUED | OUTPATIENT
Start: 2021-05-24 | End: 2021-05-24

## 2021-05-24 RX ORDER — LANOLIN ALCOHOL/MO/W.PET/CERES
800 CREAM (GRAM) TOPICAL 2 TIMES DAILY
Status: DISCONTINUED | OUTPATIENT
Start: 2021-05-24 | End: 2021-05-25 | Stop reason: HOSPADM

## 2021-05-24 RX ORDER — ONDANSETRON 2 MG/ML
4 INJECTION INTRAMUSCULAR; INTRAVENOUS EVERY 8 HOURS PRN
Status: DISCONTINUED | OUTPATIENT
Start: 2021-05-24 | End: 2021-05-25 | Stop reason: HOSPADM

## 2021-05-24 RX ORDER — ROSUVASTATIN CALCIUM 10 MG/1
20 TABLET, COATED ORAL DAILY
Status: DISCONTINUED | OUTPATIENT
Start: 2021-05-24 | End: 2021-05-25 | Stop reason: HOSPADM

## 2021-05-24 RX ORDER — IBUPROFEN 200 MG
16 TABLET ORAL
Status: DISCONTINUED | OUTPATIENT
Start: 2021-05-24 | End: 2021-05-25 | Stop reason: HOSPADM

## 2021-05-24 RX ORDER — OXYCODONE AND ACETAMINOPHEN 5; 325 MG/1; MG/1
1 TABLET ORAL EVERY 6 HOURS PRN
Status: DISCONTINUED | OUTPATIENT
Start: 2021-05-24 | End: 2021-05-25 | Stop reason: HOSPADM

## 2021-05-24 RX ORDER — AMLODIPINE BESYLATE 5 MG/1
10 TABLET ORAL DAILY
Status: DISCONTINUED | OUTPATIENT
Start: 2021-05-24 | End: 2021-05-25 | Stop reason: HOSPADM

## 2021-05-24 RX ORDER — PANTOPRAZOLE SODIUM 40 MG/1
40 TABLET, DELAYED RELEASE ORAL DAILY
Status: DISCONTINUED | OUTPATIENT
Start: 2021-05-24 | End: 2021-05-25 | Stop reason: HOSPADM

## 2021-05-24 RX ORDER — MORPHINE SULFATE 4 MG/ML
4 INJECTION, SOLUTION INTRAMUSCULAR; INTRAVENOUS EVERY 4 HOURS PRN
Status: DISCONTINUED | OUTPATIENT
Start: 2021-05-24 | End: 2021-05-25 | Stop reason: HOSPADM

## 2021-05-24 RX ORDER — INSULIN ASPART 100 [IU]/ML
0-5 INJECTION, SOLUTION INTRAVENOUS; SUBCUTANEOUS
Status: DISCONTINUED | OUTPATIENT
Start: 2021-05-24 | End: 2021-05-25 | Stop reason: HOSPADM

## 2021-05-24 RX ORDER — NAPROXEN SODIUM 220 MG/1
81 TABLET, FILM COATED ORAL DAILY
Status: DISCONTINUED | OUTPATIENT
Start: 2021-05-24 | End: 2021-05-25 | Stop reason: HOSPADM

## 2021-05-24 RX ORDER — IBUPROFEN 200 MG
24 TABLET ORAL
Status: DISCONTINUED | OUTPATIENT
Start: 2021-05-24 | End: 2021-05-25 | Stop reason: HOSPADM

## 2021-05-24 RX ORDER — HYDRALAZINE HYDROCHLORIDE 25 MG/1
100 TABLET, FILM COATED ORAL 3 TIMES DAILY
Status: DISCONTINUED | OUTPATIENT
Start: 2021-05-24 | End: 2021-05-25 | Stop reason: HOSPADM

## 2021-05-24 RX ORDER — TALC
6 POWDER (GRAM) TOPICAL NIGHTLY PRN
Status: DISCONTINUED | OUTPATIENT
Start: 2021-05-24 | End: 2021-05-25 | Stop reason: HOSPADM

## 2021-05-24 RX ORDER — FUROSEMIDE 10 MG/ML
40 INJECTION INTRAMUSCULAR; INTRAVENOUS 2 TIMES DAILY WITH MEALS
Status: DISCONTINUED | OUTPATIENT
Start: 2021-05-24 | End: 2021-05-24

## 2021-05-24 RX ORDER — METOPROLOL SUCCINATE 25 MG/1
25 TABLET, EXTENDED RELEASE ORAL DAILY
Status: DISCONTINUED | OUTPATIENT
Start: 2021-05-24 | End: 2021-05-25 | Stop reason: HOSPADM

## 2021-05-24 RX ORDER — ACETAMINOPHEN 325 MG/1
650 TABLET ORAL EVERY 4 HOURS PRN
Status: DISCONTINUED | OUTPATIENT
Start: 2021-05-24 | End: 2021-05-25 | Stop reason: HOSPADM

## 2021-05-24 RX ORDER — POTASSIUM CHLORIDE 20 MEQ/1
20 TABLET, EXTENDED RELEASE ORAL 2 TIMES DAILY WITH MEALS
Status: DISCONTINUED | OUTPATIENT
Start: 2021-05-24 | End: 2021-05-24

## 2021-05-24 RX ADMIN — MORPHINE SULFATE 4 MG: 4 INJECTION, SOLUTION INTRAMUSCULAR; INTRAVENOUS at 05:05

## 2021-05-24 RX ADMIN — ROSUVASTATIN CALCIUM 20 MG: 10 TABLET, FILM COATED ORAL at 08:05

## 2021-05-24 RX ADMIN — OXYCODONE HYDROCHLORIDE AND ACETAMINOPHEN 1 TABLET: 5; 325 TABLET ORAL at 08:05

## 2021-05-24 RX ADMIN — OXYCODONE HYDROCHLORIDE AND ACETAMINOPHEN 1 TABLET: 5; 325 TABLET ORAL at 02:05

## 2021-05-24 RX ADMIN — MAGNESIUM OXIDE 400 MG (241.3 MG MAGNESIUM) TABLET 800 MG: TABLET at 08:05

## 2021-05-24 RX ADMIN — GUAIFENESIN AND DEXTROMETHORPHAN 10 ML: 100; 10 SYRUP ORAL at 08:05

## 2021-05-24 RX ADMIN — ASPIRIN 81 MG: 81 TABLET, CHEWABLE ORAL at 08:05

## 2021-05-24 RX ADMIN — INSULIN ASPART 2 UNITS: 100 INJECTION, SOLUTION INTRAVENOUS; SUBCUTANEOUS at 04:05

## 2021-05-24 RX ADMIN — HYDRALAZINE HYDROCHLORIDE 100 MG: 25 TABLET, FILM COATED ORAL at 08:05

## 2021-05-24 RX ADMIN — PANTOPRAZOLE SODIUM 40 MG: 40 TABLET, DELAYED RELEASE ORAL at 08:05

## 2021-05-24 RX ADMIN — METOPROLOL SUCCINATE 25 MG: 25 TABLET, EXTENDED RELEASE ORAL at 08:05

## 2021-05-24 RX ADMIN — Medication 6 MG: at 08:05

## 2021-05-24 RX ADMIN — HYDRALAZINE HYDROCHLORIDE 100 MG: 25 TABLET, FILM COATED ORAL at 02:05

## 2021-05-24 RX ADMIN — THERA TABS 1 TABLET: TAB at 08:05

## 2021-05-24 RX ADMIN — MORPHINE SULFATE 4 MG: 4 INJECTION, SOLUTION INTRAMUSCULAR; INTRAVENOUS at 11:05

## 2021-05-24 RX ADMIN — FUROSEMIDE 40 MG: 10 INJECTION, SOLUTION INTRAVENOUS at 08:05

## 2021-05-24 RX ADMIN — MORPHINE SULFATE 4 MG: 4 INJECTION, SOLUTION INTRAMUSCULAR; INTRAVENOUS at 10:05

## 2021-05-24 RX ADMIN — AMLODIPINE BESYLATE 10 MG: 5 TABLET ORAL at 08:05

## 2021-05-24 RX ADMIN — INSULIN DETEMIR 7.5 UNITS: 100 INJECTION, SOLUTION SUBCUTANEOUS at 08:05

## 2021-05-24 RX ADMIN — POTASSIUM CHLORIDE 20 MEQ: 1500 TABLET, EXTENDED RELEASE ORAL at 08:05

## 2021-05-25 VITALS
WEIGHT: 212.5 LBS | HEIGHT: 71 IN | OXYGEN SATURATION: 96 % | BODY MASS INDEX: 29.75 KG/M2 | HEART RATE: 106 BPM | SYSTOLIC BLOOD PRESSURE: 124 MMHG | RESPIRATION RATE: 18 BRPM | DIASTOLIC BLOOD PRESSURE: 76 MMHG | TEMPERATURE: 98 F

## 2021-05-25 LAB
POCT GLUCOSE: 129 MG/DL (ref 70–110)
POCT GLUCOSE: 169 MG/DL (ref 70–110)

## 2021-05-25 PROCEDURE — 97535 SELF CARE MNGMENT TRAINING: CPT

## 2021-05-25 PROCEDURE — 97530 THERAPEUTIC ACTIVITIES: CPT

## 2021-05-25 PROCEDURE — G0378 HOSPITAL OBSERVATION PER HR: HCPCS

## 2021-05-25 PROCEDURE — 96374 THER/PROPH/DIAG INJ IV PUSH: CPT | Performed by: EMERGENCY MEDICINE

## 2021-05-25 PROCEDURE — 25000003 PHARM REV CODE 250: Performed by: HOSPITALIST

## 2021-05-25 PROCEDURE — 25000003 PHARM REV CODE 250: Performed by: INTERNAL MEDICINE

## 2021-05-25 PROCEDURE — 96372 THER/PROPH/DIAG INJ SC/IM: CPT | Mod: 59 | Performed by: EMERGENCY MEDICINE

## 2021-05-25 PROCEDURE — 63600175 PHARM REV CODE 636 W HCPCS: Performed by: HOSPITALIST

## 2021-05-25 PROCEDURE — 96376 TX/PRO/DX INJ SAME DRUG ADON: CPT | Performed by: EMERGENCY MEDICINE

## 2021-05-25 RX ORDER — OXYCODONE AND ACETAMINOPHEN 5; 325 MG/1; MG/1
1 TABLET ORAL EVERY 6 HOURS PRN
Qty: 20 TABLET | Refills: 0 | Status: SHIPPED | OUTPATIENT
Start: 2021-05-25 | End: 2021-05-31

## 2021-05-25 RX ORDER — TAMSULOSIN HYDROCHLORIDE 0.4 MG/1
0.4 CAPSULE ORAL DAILY
Status: DISCONTINUED | OUTPATIENT
Start: 2021-05-25 | End: 2021-05-25 | Stop reason: HOSPADM

## 2021-05-25 RX ADMIN — CEFTRIAXONE 1 G: 1 INJECTION, SOLUTION INTRAVENOUS at 09:05

## 2021-05-25 RX ADMIN — ASPIRIN 81 MG: 81 TABLET, CHEWABLE ORAL at 09:05

## 2021-05-25 RX ADMIN — ROSUVASTATIN CALCIUM 20 MG: 10 TABLET, FILM COATED ORAL at 09:05

## 2021-05-25 RX ADMIN — OXYCODONE HYDROCHLORIDE AND ACETAMINOPHEN 1 TABLET: 5; 325 TABLET ORAL at 10:05

## 2021-05-25 RX ADMIN — HYDRALAZINE HYDROCHLORIDE 100 MG: 25 TABLET, FILM COATED ORAL at 09:05

## 2021-05-25 RX ADMIN — PANTOPRAZOLE SODIUM 40 MG: 40 TABLET, DELAYED RELEASE ORAL at 09:05

## 2021-05-25 RX ADMIN — OXYCODONE HYDROCHLORIDE AND ACETAMINOPHEN 1 TABLET: 5; 325 TABLET ORAL at 04:05

## 2021-05-25 RX ADMIN — INSULIN DETEMIR 7.5 UNITS: 100 INJECTION, SOLUTION SUBCUTANEOUS at 09:05

## 2021-05-25 RX ADMIN — MORPHINE SULFATE 4 MG: 4 INJECTION, SOLUTION INTRAMUSCULAR; INTRAVENOUS at 05:05

## 2021-05-25 RX ADMIN — METOPROLOL SUCCINATE 25 MG: 25 TABLET, EXTENDED RELEASE ORAL at 09:05

## 2021-05-25 RX ADMIN — AMLODIPINE BESYLATE 10 MG: 5 TABLET ORAL at 09:05

## 2021-05-25 RX ADMIN — TAMSULOSIN HYDROCHLORIDE 0.4 MG: 0.4 CAPSULE ORAL at 08:05

## 2021-05-25 RX ADMIN — MAGNESIUM OXIDE 400 MG (241.3 MG MAGNESIUM) TABLET 800 MG: TABLET at 09:05

## 2021-05-25 RX ADMIN — THERA TABS 1 TABLET: TAB at 09:05

## 2021-05-26 LAB — BACTERIA UR CULT: ABNORMAL

## 2021-05-26 PROCEDURE — G0180 MD CERTIFICATION HHA PATIENT: HCPCS | Mod: ,,, | Performed by: HOSPITALIST

## 2021-05-26 PROCEDURE — G0180 PR HOME HEALTH MD CERTIFICATION: ICD-10-PCS | Mod: ,,, | Performed by: HOSPITALIST

## 2021-06-04 ENCOUNTER — HOSPITAL ENCOUNTER (INPATIENT)
Facility: HOSPITAL | Age: 71
LOS: 8 days | Discharge: HOME HEALTH | DRG: 291 | End: 2021-06-12
Attending: EMERGENCY MEDICINE | Admitting: STUDENT IN AN ORGANIZED HEALTH CARE EDUCATION/TRAINING PROGRAM
Payer: MEDICARE

## 2021-06-04 DIAGNOSIS — I50.33 ACUTE ON CHRONIC DIASTOLIC CONGESTIVE HEART FAILURE: ICD-10-CM

## 2021-06-04 DIAGNOSIS — J96.00 ACUTE RESPIRATORY FAILURE, UNSPECIFIED WHETHER WITH HYPOXIA OR HYPERCAPNIA: Primary | ICD-10-CM

## 2021-06-04 DIAGNOSIS — S22.31XD CLOSED FRACTURE OF ONE RIB OF RIGHT SIDE WITH ROUTINE HEALING, SUBSEQUENT ENCOUNTER: ICD-10-CM

## 2021-06-04 DIAGNOSIS — R06.02 SHORTNESS OF BREATH: ICD-10-CM

## 2021-06-04 PROBLEM — J96.02 ACUTE RESPIRATORY FAILURE WITH HYPOXIA AND HYPERCARBIA: Status: ACTIVE | Noted: 2020-12-05

## 2021-06-04 LAB
ALBUMIN SERPL BCP-MCNC: 3.6 G/DL (ref 3.5–5.2)
ALLENS TEST: ABNORMAL
ALP SERPL-CCNC: 97 U/L (ref 55–135)
ALT SERPL W/O P-5'-P-CCNC: 56 U/L (ref 10–44)
ANION GAP SERPL CALC-SCNC: 13 MMOL/L (ref 8–16)
AST SERPL-CCNC: 52 U/L (ref 10–40)
BASOPHILS # BLD AUTO: 0.11 K/UL (ref 0–0.2)
BASOPHILS NFR BLD: 1.1 % (ref 0–1.9)
BILIRUB SERPL-MCNC: 0.4 MG/DL (ref 0.1–1)
BNP SERPL-MCNC: 548 PG/ML (ref 0–99)
BUN SERPL-MCNC: 24 MG/DL (ref 8–23)
CALCIUM SERPL-MCNC: 9.2 MG/DL (ref 8.7–10.5)
CHLORIDE SERPL-SCNC: 102 MMOL/L (ref 95–110)
CO2 SERPL-SCNC: 24 MMOL/L (ref 23–29)
CREAT SERPL-MCNC: 1.8 MG/DL (ref 0.5–1.4)
CTP QC/QA: YES
D DIMER PPP IA.FEU-MCNC: 0.96 MG/L FEU
DELSYS: ABNORMAL
DIFFERENTIAL METHOD: ABNORMAL
EOSINOPHIL # BLD AUTO: 0.3 K/UL (ref 0–0.5)
EOSINOPHIL NFR BLD: 2.5 % (ref 0–8)
EP: 5
ERYTHROCYTE [DISTWIDTH] IN BLOOD BY AUTOMATED COUNT: 15 % (ref 11.5–14.5)
ERYTHROCYTE [SEDIMENTATION RATE] IN BLOOD BY WESTERGREN METHOD: 10 MM/H
EST. GFR  (AFRICAN AMERICAN): 43 ML/MIN/1.73 M^2
EST. GFR  (NON AFRICAN AMERICAN): 37 ML/MIN/1.73 M^2
FIO2: 65
GLUCOSE SERPL-MCNC: 157 MG/DL (ref 70–110)
HCO3 UR-SCNC: 30.2 MMOL/L (ref 24–28)
HCT VFR BLD AUTO: 34.5 % (ref 40–54)
HGB BLD-MCNC: 11.6 G/DL (ref 14–18)
IMM GRANULOCYTES # BLD AUTO: 0.06 K/UL (ref 0–0.04)
IMM GRANULOCYTES NFR BLD AUTO: 0.6 % (ref 0–0.5)
IP: 12
LYMPHOCYTES # BLD AUTO: 1.1 K/UL (ref 1–4.8)
LYMPHOCYTES NFR BLD: 11.1 % (ref 18–48)
MAGNESIUM SERPL-MCNC: 1.7 MG/DL (ref 1.6–2.6)
MCH RBC QN AUTO: 27.4 PG (ref 27–31)
MCHC RBC AUTO-ENTMCNC: 33.6 G/DL (ref 32–36)
MCV RBC AUTO: 82 FL (ref 82–98)
MIN VOL: 14.6
MODE: ABNORMAL
MONOCYTES # BLD AUTO: 0.9 K/UL (ref 0.3–1)
MONOCYTES NFR BLD: 8.6 % (ref 4–15)
NEUTROPHILS # BLD AUTO: 7.5 K/UL (ref 1.8–7.7)
NEUTROPHILS NFR BLD: 76.1 % (ref 38–73)
NRBC BLD-RTO: 0 /100 WBC
PCO2 BLDA: 50.5 MMHG (ref 35–45)
PH SMN: 7.38 [PH] (ref 7.35–7.45)
PHOSPHATE SERPL-MCNC: 3 MG/DL (ref 2.7–4.5)
PLATELET # BLD AUTO: 217 K/UL (ref 150–450)
PMV BLD AUTO: 8.9 FL (ref 9.2–12.9)
PO2 BLDA: 320 MMHG (ref 80–100)
POC BE: 4 MMOL/L
POC SATURATED O2: 100 % (ref 95–100)
POC TCO2: 32 MMOL/L (ref 23–27)
POCT GLUCOSE: 184 MG/DL (ref 70–110)
POTASSIUM SERPL-SCNC: 3.8 MMOL/L (ref 3.5–5.1)
PROT SERPL-MCNC: 7.3 G/DL (ref 6–8.4)
RBC # BLD AUTO: 4.23 M/UL (ref 4.6–6.2)
SAMPLE: ABNORMAL
SARS-COV-2 RDRP RESP QL NAA+PROBE: NEGATIVE
SITE: ABNORMAL
SODIUM SERPL-SCNC: 139 MMOL/L (ref 136–145)
SP02: 100
SPONT RATE: 27
TROPONIN I SERPL DL<=0.01 NG/ML-MCNC: 0.02 NG/ML (ref 0–0.03)
WBC # BLD AUTO: 9.9 K/UL (ref 3.9–12.7)

## 2021-06-04 PROCEDURE — 63600175 PHARM REV CODE 636 W HCPCS: Performed by: EMERGENCY MEDICINE

## 2021-06-04 PROCEDURE — 93005 ELECTROCARDIOGRAM TRACING: CPT

## 2021-06-04 PROCEDURE — 83880 ASSAY OF NATRIURETIC PEPTIDE: CPT | Performed by: EMERGENCY MEDICINE

## 2021-06-04 PROCEDURE — 84484 ASSAY OF TROPONIN QUANT: CPT | Performed by: EMERGENCY MEDICINE

## 2021-06-04 PROCEDURE — 83735 ASSAY OF MAGNESIUM: CPT | Performed by: EMERGENCY MEDICINE

## 2021-06-04 PROCEDURE — 82803 BLOOD GASES ANY COMBINATION: CPT

## 2021-06-04 PROCEDURE — 80053 COMPREHEN METABOLIC PANEL: CPT | Performed by: EMERGENCY MEDICINE

## 2021-06-04 PROCEDURE — 85025 COMPLETE CBC W/AUTO DIFF WBC: CPT | Performed by: EMERGENCY MEDICINE

## 2021-06-04 PROCEDURE — U0002 COVID-19 LAB TEST NON-CDC: HCPCS | Performed by: EMERGENCY MEDICINE

## 2021-06-04 PROCEDURE — 93010 ELECTROCARDIOGRAM REPORT: CPT | Mod: ,,, | Performed by: INTERNAL MEDICINE

## 2021-06-04 PROCEDURE — 84100 ASSAY OF PHOSPHORUS: CPT | Performed by: EMERGENCY MEDICINE

## 2021-06-04 PROCEDURE — 96374 THER/PROPH/DIAG INJ IV PUSH: CPT

## 2021-06-04 PROCEDURE — 27000190 HC CPAP FULL FACE MASK W/VALVE

## 2021-06-04 PROCEDURE — 85379 FIBRIN DEGRADATION QUANT: CPT | Performed by: EMERGENCY MEDICINE

## 2021-06-04 PROCEDURE — 25000003 PHARM REV CODE 250: Performed by: EMERGENCY MEDICINE

## 2021-06-04 PROCEDURE — 36600 WITHDRAWAL OF ARTERIAL BLOOD: CPT

## 2021-06-04 PROCEDURE — 25000003 PHARM REV CODE 250: Performed by: STUDENT IN AN ORGANIZED HEALTH CARE EDUCATION/TRAINING PROGRAM

## 2021-06-04 PROCEDURE — 82962 GLUCOSE BLOOD TEST: CPT

## 2021-06-04 PROCEDURE — 12000002 HC ACUTE/MED SURGE SEMI-PRIVATE ROOM

## 2021-06-04 PROCEDURE — 99291 CRITICAL CARE FIRST HOUR: CPT | Mod: 25

## 2021-06-04 PROCEDURE — 94660 CPAP INITIATION&MGMT: CPT

## 2021-06-04 PROCEDURE — 93010 EKG 12-LEAD: ICD-10-PCS | Mod: ,,, | Performed by: INTERNAL MEDICINE

## 2021-06-04 PROCEDURE — 99900035 HC TECH TIME PER 15 MIN (STAT)

## 2021-06-04 RX ORDER — POTASSIUM CHLORIDE 20 MEQ/1
40 TABLET, EXTENDED RELEASE ORAL ONCE
Status: COMPLETED | OUTPATIENT
Start: 2021-06-05 | End: 2021-06-05

## 2021-06-04 RX ORDER — ASPIRIN 325 MG
325 TABLET ORAL
Status: COMPLETED | OUTPATIENT
Start: 2021-06-04 | End: 2021-06-04

## 2021-06-04 RX ORDER — NAPROXEN SODIUM 220 MG/1
81 TABLET, FILM COATED ORAL DAILY
Status: DISCONTINUED | OUTPATIENT
Start: 2021-06-05 | End: 2021-06-12 | Stop reason: HOSPADM

## 2021-06-04 RX ORDER — GLUCAGON 1 MG
1 KIT INJECTION
Status: DISCONTINUED | OUTPATIENT
Start: 2021-06-05 | End: 2021-06-12 | Stop reason: HOSPADM

## 2021-06-04 RX ORDER — INSULIN ASPART 100 [IU]/ML
0-5 INJECTION, SOLUTION INTRAVENOUS; SUBCUTANEOUS
Status: DISCONTINUED | OUTPATIENT
Start: 2021-06-05 | End: 2021-06-12 | Stop reason: HOSPADM

## 2021-06-04 RX ORDER — AMLODIPINE BESYLATE 5 MG/1
10 TABLET ORAL DAILY
Status: DISCONTINUED | OUTPATIENT
Start: 2021-06-05 | End: 2021-06-09

## 2021-06-04 RX ORDER — FUROSEMIDE 10 MG/ML
80 INJECTION INTRAMUSCULAR; INTRAVENOUS
Status: DISCONTINUED | OUTPATIENT
Start: 2021-06-05 | End: 2021-06-06

## 2021-06-04 RX ORDER — HEPARIN SODIUM 5000 [USP'U]/ML
5000 INJECTION, SOLUTION INTRAVENOUS; SUBCUTANEOUS EVERY 8 HOURS
Status: DISCONTINUED | OUTPATIENT
Start: 2021-06-05 | End: 2021-06-05

## 2021-06-04 RX ORDER — PANTOPRAZOLE SODIUM 40 MG/1
40 TABLET, DELAYED RELEASE ORAL DAILY
Status: DISCONTINUED | OUTPATIENT
Start: 2021-06-05 | End: 2021-06-12 | Stop reason: HOSPADM

## 2021-06-04 RX ORDER — HYDRALAZINE HYDROCHLORIDE 25 MG/1
100 TABLET, FILM COATED ORAL
Status: COMPLETED | OUTPATIENT
Start: 2021-06-04 | End: 2021-06-04

## 2021-06-04 RX ORDER — IBUPROFEN 200 MG
24 TABLET ORAL
Status: DISCONTINUED | OUTPATIENT
Start: 2021-06-05 | End: 2021-06-12 | Stop reason: HOSPADM

## 2021-06-04 RX ORDER — HYDRALAZINE HYDROCHLORIDE 25 MG/1
100 TABLET, FILM COATED ORAL 3 TIMES DAILY
Status: DISCONTINUED | OUTPATIENT
Start: 2021-06-05 | End: 2021-06-09

## 2021-06-04 RX ORDER — TAMSULOSIN HYDROCHLORIDE 0.4 MG/1
0.4 CAPSULE ORAL DAILY
Status: DISCONTINUED | OUTPATIENT
Start: 2021-06-05 | End: 2021-06-12 | Stop reason: HOSPADM

## 2021-06-04 RX ORDER — LANOLIN ALCOHOL/MO/W.PET/CERES
800 CREAM (GRAM) TOPICAL DAILY
Status: DISCONTINUED | OUTPATIENT
Start: 2021-06-05 | End: 2021-06-11

## 2021-06-04 RX ORDER — ONDANSETRON 4 MG/1
4 TABLET, ORALLY DISINTEGRATING ORAL EVERY 8 HOURS PRN
Status: DISCONTINUED | OUTPATIENT
Start: 2021-06-05 | End: 2021-06-12 | Stop reason: HOSPADM

## 2021-06-04 RX ORDER — SODIUM CHLORIDE 0.9 % (FLUSH) 0.9 %
10 SYRINGE (ML) INJECTION
Status: DISCONTINUED | OUTPATIENT
Start: 2021-06-05 | End: 2021-06-12 | Stop reason: HOSPADM

## 2021-06-04 RX ORDER — ROSUVASTATIN CALCIUM 10 MG/1
20 TABLET, COATED ORAL DAILY
Status: DISCONTINUED | OUTPATIENT
Start: 2021-06-05 | End: 2021-06-12 | Stop reason: HOSPADM

## 2021-06-04 RX ORDER — FUROSEMIDE 10 MG/ML
80 INJECTION INTRAMUSCULAR; INTRAVENOUS
Status: COMPLETED | OUTPATIENT
Start: 2021-06-04 | End: 2021-06-04

## 2021-06-04 RX ORDER — IBUPROFEN 200 MG
16 TABLET ORAL
Status: DISCONTINUED | OUTPATIENT
Start: 2021-06-05 | End: 2021-06-12 | Stop reason: HOSPADM

## 2021-06-04 RX ORDER — POLYETHYLENE GLYCOL 3350 17 G/17G
17 POWDER, FOR SOLUTION ORAL DAILY
Status: DISCONTINUED | OUTPATIENT
Start: 2021-06-05 | End: 2021-06-12 | Stop reason: HOSPADM

## 2021-06-04 RX ADMIN — FUROSEMIDE 80 MG: 10 INJECTION, SOLUTION INTRAVENOUS at 09:06

## 2021-06-04 RX ADMIN — ASPIRIN 325 MG ORAL TABLET 325 MG: 325 PILL ORAL at 09:06

## 2021-06-04 RX ADMIN — NITROGLYCERIN 0.5 INCH: 20 OINTMENT TOPICAL at 09:06

## 2021-06-04 RX ADMIN — HYDRALAZINE HYDROCHLORIDE 100 MG: 25 TABLET, FILM COATED ORAL at 11:06

## 2021-06-05 PROBLEM — T50.2X5A DIURETIC-INDUCED HYPOKALEMIA: Status: ACTIVE | Noted: 2019-02-14

## 2021-06-05 LAB
ANION GAP SERPL CALC-SCNC: 10 MMOL/L (ref 8–16)
BUN SERPL-MCNC: 27 MG/DL (ref 8–23)
CALCIUM SERPL-MCNC: 9 MG/DL (ref 8.7–10.5)
CHLORIDE SERPL-SCNC: 101 MMOL/L (ref 95–110)
CO2 SERPL-SCNC: 27 MMOL/L (ref 23–29)
CREAT SERPL-MCNC: 1.7 MG/DL (ref 0.5–1.4)
EST. GFR  (AFRICAN AMERICAN): 46 ML/MIN/1.73 M^2
EST. GFR  (NON AFRICAN AMERICAN): 40 ML/MIN/1.73 M^2
ESTIMATED AVG GLUCOSE: 177 MG/DL (ref 68–131)
GLUCOSE SERPL-MCNC: 126 MG/DL (ref 70–110)
HBA1C MFR BLD: 7.8 % (ref 4–5.6)
POCT GLUCOSE: 110 MG/DL (ref 70–110)
POCT GLUCOSE: 115 MG/DL (ref 70–110)
POCT GLUCOSE: 161 MG/DL (ref 70–110)
POCT GLUCOSE: 170 MG/DL (ref 70–110)
POCT GLUCOSE: 174 MG/DL (ref 70–110)
POTASSIUM SERPL-SCNC: 3.8 MMOL/L (ref 3.5–5.1)
SODIUM SERPL-SCNC: 138 MMOL/L (ref 136–145)
TROPONIN I SERPL DL<=0.01 NG/ML-MCNC: 0.03 NG/ML (ref 0–0.03)

## 2021-06-05 PROCEDURE — 83036 HEMOGLOBIN GLYCOSYLATED A1C: CPT | Performed by: INTERNAL MEDICINE

## 2021-06-05 PROCEDURE — 94761 N-INVAS EAR/PLS OXIMETRY MLT: CPT

## 2021-06-05 PROCEDURE — 94660 CPAP INITIATION&MGMT: CPT

## 2021-06-05 PROCEDURE — 63600175 PHARM REV CODE 636 W HCPCS: Performed by: INTERNAL MEDICINE

## 2021-06-05 PROCEDURE — 36415 COLL VENOUS BLD VENIPUNCTURE: CPT | Performed by: INTERNAL MEDICINE

## 2021-06-05 PROCEDURE — 84484 ASSAY OF TROPONIN QUANT: CPT | Performed by: INTERNAL MEDICINE

## 2021-06-05 PROCEDURE — 27000221 HC OXYGEN, UP TO 24 HOURS

## 2021-06-05 PROCEDURE — 25000003 PHARM REV CODE 250: Performed by: HOSPITALIST

## 2021-06-05 PROCEDURE — 25000003 PHARM REV CODE 250: Performed by: INTERNAL MEDICINE

## 2021-06-05 PROCEDURE — C9399 UNCLASSIFIED DRUGS OR BIOLOG: HCPCS | Performed by: INTERNAL MEDICINE

## 2021-06-05 PROCEDURE — 25000242 PHARM REV CODE 250 ALT 637 W/ HCPCS: Performed by: STUDENT IN AN ORGANIZED HEALTH CARE EDUCATION/TRAINING PROGRAM

## 2021-06-05 PROCEDURE — 94640 AIRWAY INHALATION TREATMENT: CPT

## 2021-06-05 PROCEDURE — 21400001 HC TELEMETRY ROOM

## 2021-06-05 PROCEDURE — 25000003 PHARM REV CODE 250: Performed by: STUDENT IN AN ORGANIZED HEALTH CARE EDUCATION/TRAINING PROGRAM

## 2021-06-05 PROCEDURE — 80048 BASIC METABOLIC PNL TOTAL CA: CPT | Performed by: INTERNAL MEDICINE

## 2021-06-05 PROCEDURE — 99900035 HC TECH TIME PER 15 MIN (STAT)

## 2021-06-05 RX ORDER — LIDOCAINE 50 MG/G
1 PATCH TOPICAL
Status: DISCONTINUED | OUTPATIENT
Start: 2021-06-05 | End: 2021-06-12 | Stop reason: HOSPADM

## 2021-06-05 RX ORDER — GUAIFENESIN/DEXTROMETHORPHAN 100-10MG/5
10 SYRUP ORAL EVERY 4 HOURS PRN
Status: DISCONTINUED | OUTPATIENT
Start: 2021-06-05 | End: 2021-06-12 | Stop reason: HOSPADM

## 2021-06-05 RX ORDER — TALC
6 POWDER (GRAM) TOPICAL NIGHTLY PRN
Status: DISCONTINUED | OUTPATIENT
Start: 2021-06-06 | End: 2021-06-12 | Stop reason: HOSPADM

## 2021-06-05 RX ORDER — LOSARTAN POTASSIUM 100 MG/1
100 TABLET ORAL DAILY
COMMUNITY
End: 2021-09-05

## 2021-06-05 RX ORDER — ACETAMINOPHEN 325 MG/1
650 TABLET ORAL EVERY 6 HOURS PRN
Status: DISCONTINUED | OUTPATIENT
Start: 2021-06-05 | End: 2021-06-09

## 2021-06-05 RX ORDER — METOPROLOL SUCCINATE 50 MG/1
50 TABLET, EXTENDED RELEASE ORAL DAILY
Status: DISCONTINUED | OUTPATIENT
Start: 2021-06-05 | End: 2021-06-12 | Stop reason: HOSPADM

## 2021-06-05 RX ORDER — LOSARTAN POTASSIUM 25 MG/1
100 TABLET ORAL DAILY
Status: DISCONTINUED | OUTPATIENT
Start: 2021-06-05 | End: 2021-06-08

## 2021-06-05 RX ORDER — OXYCODONE HYDROCHLORIDE 5 MG/1
5 TABLET ORAL EVERY 6 HOURS PRN
Status: DISCONTINUED | OUTPATIENT
Start: 2021-06-05 | End: 2021-06-09

## 2021-06-05 RX ORDER — IPRATROPIUM BROMIDE AND ALBUTEROL SULFATE 2.5; .5 MG/3ML; MG/3ML
3 SOLUTION RESPIRATORY (INHALATION) EVERY 4 HOURS PRN
Status: DISCONTINUED | OUTPATIENT
Start: 2021-06-05 | End: 2021-06-12 | Stop reason: HOSPADM

## 2021-06-05 RX ORDER — METOPROLOL SUCCINATE 50 MG/1
50 TABLET, EXTENDED RELEASE ORAL DAILY
Status: ON HOLD | COMMUNITY
End: 2022-02-10 | Stop reason: SDUPTHER

## 2021-06-05 RX ADMIN — LIDOCAINE 1 PATCH: 50 PATCH TOPICAL at 04:06

## 2021-06-05 RX ADMIN — LOSARTAN POTASSIUM 100 MG: 25 TABLET, FILM COATED ORAL at 09:06

## 2021-06-05 RX ADMIN — HEPARIN SODIUM 5000 UNITS: 5000 INJECTION INTRAVENOUS; SUBCUTANEOUS at 05:06

## 2021-06-05 RX ADMIN — FUROSEMIDE 80 MG: 10 INJECTION, SOLUTION INTRAVENOUS at 02:06

## 2021-06-05 RX ADMIN — ASPIRIN 81 MG CHEWABLE TABLET 81 MG: 81 TABLET CHEWABLE at 08:06

## 2021-06-05 RX ADMIN — OXYCODONE 5 MG: 5 TABLET ORAL at 09:06

## 2021-06-05 RX ADMIN — INSULIN DETEMIR 10 UNITS: 100 INJECTION, SOLUTION SUBCUTANEOUS at 08:06

## 2021-06-05 RX ADMIN — METOPROLOL SUCCINATE 50 MG: 50 TABLET, FILM COATED, EXTENDED RELEASE ORAL at 09:06

## 2021-06-05 RX ADMIN — HEPARIN SODIUM 5000 UNITS: 5000 INJECTION INTRAVENOUS; SUBCUTANEOUS at 02:06

## 2021-06-05 RX ADMIN — GUAIFENESIN AND DEXTROMETHORPHAN 10 ML: 100; 10 SYRUP ORAL at 09:06

## 2021-06-05 RX ADMIN — POLYETHYLENE GLYCOL 3350 17 G: 17 POWDER, FOR SOLUTION ORAL at 08:06

## 2021-06-05 RX ADMIN — TAMSULOSIN HYDROCHLORIDE 0.4 MG: 0.4 CAPSULE ORAL at 09:06

## 2021-06-05 RX ADMIN — PANTOPRAZOLE SODIUM 40 MG: 40 TABLET, DELAYED RELEASE ORAL at 08:06

## 2021-06-05 RX ADMIN — FUROSEMIDE 80 MG: 10 INJECTION, SOLUTION INTRAVENOUS at 01:06

## 2021-06-05 RX ADMIN — HYDRALAZINE HYDROCHLORIDE 100 MG: 25 TABLET, FILM COATED ORAL at 02:06

## 2021-06-05 RX ADMIN — Medication 800 MG: at 08:06

## 2021-06-05 RX ADMIN — Medication 800 MG: at 02:06

## 2021-06-05 RX ADMIN — HYDRALAZINE HYDROCHLORIDE 100 MG: 25 TABLET, FILM COATED ORAL at 09:06

## 2021-06-05 RX ADMIN — IPRATROPIUM BROMIDE AND ALBUTEROL SULFATE 3 ML: .5; 3 SOLUTION RESPIRATORY (INHALATION) at 07:06

## 2021-06-05 RX ADMIN — ROSUVASTATIN CALCIUM 20 MG: 10 TABLET, FILM COATED ORAL at 08:06

## 2021-06-05 RX ADMIN — POTASSIUM CHLORIDE 40 MEQ: 1500 TABLET, EXTENDED RELEASE ORAL at 02:06

## 2021-06-05 RX ADMIN — IPRATROPIUM BROMIDE AND ALBUTEROL SULFATE 3 ML: .5; 3 SOLUTION RESPIRATORY (INHALATION) at 09:06

## 2021-06-05 RX ADMIN — AMLODIPINE BESYLATE 10 MG: 5 TABLET ORAL at 08:06

## 2021-06-05 RX ADMIN — FUROSEMIDE 80 MG: 10 INJECTION, SOLUTION INTRAVENOUS at 09:06

## 2021-06-05 RX ADMIN — HYDRALAZINE HYDROCHLORIDE 100 MG: 25 TABLET, FILM COATED ORAL at 08:06

## 2021-06-06 LAB
ANION GAP SERPL CALC-SCNC: 11 MMOL/L (ref 8–16)
BUN SERPL-MCNC: 31 MG/DL (ref 8–23)
CALCIUM SERPL-MCNC: 9.3 MG/DL (ref 8.7–10.5)
CHLORIDE SERPL-SCNC: 100 MMOL/L (ref 95–110)
CO2 SERPL-SCNC: 28 MMOL/L (ref 23–29)
CREAT SERPL-MCNC: 1.9 MG/DL (ref 0.5–1.4)
EST. GFR  (AFRICAN AMERICAN): 40 ML/MIN/1.73 M^2
EST. GFR  (NON AFRICAN AMERICAN): 35 ML/MIN/1.73 M^2
GLUCOSE SERPL-MCNC: 158 MG/DL (ref 70–110)
POCT GLUCOSE: 152 MG/DL (ref 70–110)
POCT GLUCOSE: 169 MG/DL (ref 70–110)
POCT GLUCOSE: 170 MG/DL (ref 70–110)
POCT GLUCOSE: 197 MG/DL (ref 70–110)
POTASSIUM SERPL-SCNC: 3.6 MMOL/L (ref 3.5–5.1)
SODIUM SERPL-SCNC: 139 MMOL/L (ref 136–145)

## 2021-06-06 PROCEDURE — 25000003 PHARM REV CODE 250: Performed by: HOSPITALIST

## 2021-06-06 PROCEDURE — 21400001 HC TELEMETRY ROOM

## 2021-06-06 PROCEDURE — 94660 CPAP INITIATION&MGMT: CPT

## 2021-06-06 PROCEDURE — 63600175 PHARM REV CODE 636 W HCPCS: Performed by: INTERNAL MEDICINE

## 2021-06-06 PROCEDURE — 99900035 HC TECH TIME PER 15 MIN (STAT)

## 2021-06-06 PROCEDURE — 94761 N-INVAS EAR/PLS OXIMETRY MLT: CPT

## 2021-06-06 PROCEDURE — 63600175 PHARM REV CODE 636 W HCPCS: Performed by: STUDENT IN AN ORGANIZED HEALTH CARE EDUCATION/TRAINING PROGRAM

## 2021-06-06 PROCEDURE — 36415 COLL VENOUS BLD VENIPUNCTURE: CPT | Performed by: INTERNAL MEDICINE

## 2021-06-06 PROCEDURE — 80048 BASIC METABOLIC PNL TOTAL CA: CPT | Performed by: INTERNAL MEDICINE

## 2021-06-06 PROCEDURE — 25000003 PHARM REV CODE 250: Performed by: INTERNAL MEDICINE

## 2021-06-06 RX ORDER — FUROSEMIDE 10 MG/ML
40 INJECTION INTRAMUSCULAR; INTRAVENOUS
Status: DISCONTINUED | OUTPATIENT
Start: 2021-06-06 | End: 2021-06-08

## 2021-06-06 RX ADMIN — APIXABAN 5 MG: 5 TABLET, FILM COATED ORAL at 09:06

## 2021-06-06 RX ADMIN — HYDRALAZINE HYDROCHLORIDE 100 MG: 25 TABLET, FILM COATED ORAL at 08:06

## 2021-06-06 RX ADMIN — TAMSULOSIN HYDROCHLORIDE 0.4 MG: 0.4 CAPSULE ORAL at 08:06

## 2021-06-06 RX ADMIN — OXYCODONE 5 MG: 5 TABLET ORAL at 06:06

## 2021-06-06 RX ADMIN — ASPIRIN 81 MG CHEWABLE TABLET 81 MG: 81 TABLET CHEWABLE at 08:06

## 2021-06-06 RX ADMIN — INSULIN DETEMIR 10 UNITS: 100 INJECTION, SOLUTION SUBCUTANEOUS at 08:06

## 2021-06-06 RX ADMIN — Medication 6 MG: at 09:06

## 2021-06-06 RX ADMIN — AMLODIPINE BESYLATE 10 MG: 5 TABLET ORAL at 08:06

## 2021-06-06 RX ADMIN — METOPROLOL SUCCINATE 50 MG: 50 TABLET, FILM COATED, EXTENDED RELEASE ORAL at 08:06

## 2021-06-06 RX ADMIN — Medication 6 MG: at 12:06

## 2021-06-06 RX ADMIN — ACETAMINOPHEN 650 MG: 325 TABLET ORAL at 09:06

## 2021-06-06 RX ADMIN — LIDOCAINE 1 PATCH: 50 PATCH TOPICAL at 04:06

## 2021-06-06 RX ADMIN — ROSUVASTATIN CALCIUM 20 MG: 10 TABLET, FILM COATED ORAL at 08:06

## 2021-06-06 RX ADMIN — HYDRALAZINE HYDROCHLORIDE 100 MG: 25 TABLET, FILM COATED ORAL at 04:06

## 2021-06-06 RX ADMIN — PANTOPRAZOLE SODIUM 40 MG: 40 TABLET, DELAYED RELEASE ORAL at 08:06

## 2021-06-06 RX ADMIN — GUAIFENESIN AND DEXTROMETHORPHAN 10 ML: 100; 10 SYRUP ORAL at 09:06

## 2021-06-06 RX ADMIN — FUROSEMIDE 40 MG: 10 INJECTION, SOLUTION INTRAVENOUS at 12:06

## 2021-06-06 RX ADMIN — HYDRALAZINE HYDROCHLORIDE 100 MG: 25 TABLET, FILM COATED ORAL at 09:06

## 2021-06-06 RX ADMIN — APIXABAN 5 MG: 5 TABLET, FILM COATED ORAL at 08:06

## 2021-06-06 RX ADMIN — FUROSEMIDE 80 MG: 10 INJECTION, SOLUTION INTRAVENOUS at 12:06

## 2021-06-06 RX ADMIN — POLYETHYLENE GLYCOL 3350 17 G: 17 POWDER, FOR SOLUTION ORAL at 08:06

## 2021-06-06 RX ADMIN — LOSARTAN POTASSIUM 100 MG: 25 TABLET, FILM COATED ORAL at 08:06

## 2021-06-06 RX ADMIN — Medication 800 MG: at 08:06

## 2021-06-07 LAB
ANION GAP SERPL CALC-SCNC: 10 MMOL/L (ref 8–16)
BUN SERPL-MCNC: 36 MG/DL (ref 8–23)
CALCIUM SERPL-MCNC: 9.3 MG/DL (ref 8.7–10.5)
CHLORIDE SERPL-SCNC: 99 MMOL/L (ref 95–110)
CO2 SERPL-SCNC: 29 MMOL/L (ref 23–29)
CREAT SERPL-MCNC: 1.9 MG/DL (ref 0.5–1.4)
EST. GFR  (AFRICAN AMERICAN): 40 ML/MIN/1.73 M^2
EST. GFR  (NON AFRICAN AMERICAN): 35 ML/MIN/1.73 M^2
GLUCOSE SERPL-MCNC: 153 MG/DL (ref 70–110)
POCT GLUCOSE: 173 MG/DL (ref 70–110)
POCT GLUCOSE: 184 MG/DL (ref 70–110)
POCT GLUCOSE: 256 MG/DL (ref 70–110)
POCT GLUCOSE: 266 MG/DL (ref 70–110)
POTASSIUM SERPL-SCNC: 3.5 MMOL/L (ref 3.5–5.1)
SODIUM SERPL-SCNC: 138 MMOL/L (ref 136–145)

## 2021-06-07 PROCEDURE — C9399 UNCLASSIFIED DRUGS OR BIOLOG: HCPCS | Performed by: INTERNAL MEDICINE

## 2021-06-07 PROCEDURE — 99900035 HC TECH TIME PER 15 MIN (STAT)

## 2021-06-07 PROCEDURE — 97161 PT EVAL LOW COMPLEX 20 MIN: CPT

## 2021-06-07 PROCEDURE — 21400001 HC TELEMETRY ROOM

## 2021-06-07 PROCEDURE — 27000221 HC OXYGEN, UP TO 24 HOURS

## 2021-06-07 PROCEDURE — 97165 OT EVAL LOW COMPLEX 30 MIN: CPT

## 2021-06-07 PROCEDURE — 36415 COLL VENOUS BLD VENIPUNCTURE: CPT | Performed by: INTERNAL MEDICINE

## 2021-06-07 PROCEDURE — 25000242 PHARM REV CODE 250 ALT 637 W/ HCPCS: Performed by: STUDENT IN AN ORGANIZED HEALTH CARE EDUCATION/TRAINING PROGRAM

## 2021-06-07 PROCEDURE — 94761 N-INVAS EAR/PLS OXIMETRY MLT: CPT

## 2021-06-07 PROCEDURE — 25000003 PHARM REV CODE 250: Performed by: STUDENT IN AN ORGANIZED HEALTH CARE EDUCATION/TRAINING PROGRAM

## 2021-06-07 PROCEDURE — 63600175 PHARM REV CODE 636 W HCPCS: Performed by: INTERNAL MEDICINE

## 2021-06-07 PROCEDURE — 25000003 PHARM REV CODE 250: Performed by: HOSPITALIST

## 2021-06-07 PROCEDURE — 94640 AIRWAY INHALATION TREATMENT: CPT

## 2021-06-07 PROCEDURE — 97116 GAIT TRAINING THERAPY: CPT

## 2021-06-07 PROCEDURE — 25000003 PHARM REV CODE 250: Performed by: INTERNAL MEDICINE

## 2021-06-07 PROCEDURE — 63600175 PHARM REV CODE 636 W HCPCS: Performed by: STUDENT IN AN ORGANIZED HEALTH CARE EDUCATION/TRAINING PROGRAM

## 2021-06-07 PROCEDURE — 94660 CPAP INITIATION&MGMT: CPT

## 2021-06-07 PROCEDURE — 80048 BASIC METABOLIC PNL TOTAL CA: CPT | Performed by: INTERNAL MEDICINE

## 2021-06-07 RX ADMIN — ROSUVASTATIN CALCIUM 20 MG: 10 TABLET, FILM COATED ORAL at 10:06

## 2021-06-07 RX ADMIN — ASPIRIN 81 MG CHEWABLE TABLET 81 MG: 81 TABLET CHEWABLE at 09:06

## 2021-06-07 RX ADMIN — AMLODIPINE BESYLATE 10 MG: 5 TABLET ORAL at 09:06

## 2021-06-07 RX ADMIN — PANTOPRAZOLE SODIUM 40 MG: 40 TABLET, DELAYED RELEASE ORAL at 09:06

## 2021-06-07 RX ADMIN — TAMSULOSIN HYDROCHLORIDE 0.4 MG: 0.4 CAPSULE ORAL at 10:06

## 2021-06-07 RX ADMIN — IPRATROPIUM BROMIDE AND ALBUTEROL SULFATE 3 ML: .5; 3 SOLUTION RESPIRATORY (INHALATION) at 10:06

## 2021-06-07 RX ADMIN — INSULIN ASPART 3 UNITS: 100 INJECTION, SOLUTION INTRAVENOUS; SUBCUTANEOUS at 12:06

## 2021-06-07 RX ADMIN — INSULIN ASPART 3 UNITS: 100 INJECTION, SOLUTION INTRAVENOUS; SUBCUTANEOUS at 09:06

## 2021-06-07 RX ADMIN — OXYCODONE 5 MG: 5 TABLET ORAL at 12:06

## 2021-06-07 RX ADMIN — APIXABAN 5 MG: 5 TABLET, FILM COATED ORAL at 08:06

## 2021-06-07 RX ADMIN — APIXABAN 5 MG: 5 TABLET, FILM COATED ORAL at 09:06

## 2021-06-07 RX ADMIN — OXYCODONE 5 MG: 5 TABLET ORAL at 08:06

## 2021-06-07 RX ADMIN — METOPROLOL SUCCINATE 50 MG: 50 TABLET, FILM COATED, EXTENDED RELEASE ORAL at 09:06

## 2021-06-07 RX ADMIN — HYDRALAZINE HYDROCHLORIDE 100 MG: 25 TABLET, FILM COATED ORAL at 08:06

## 2021-06-07 RX ADMIN — OXYCODONE 5 MG: 5 TABLET ORAL at 01:06

## 2021-06-07 RX ADMIN — HYDRALAZINE HYDROCHLORIDE 100 MG: 25 TABLET, FILM COATED ORAL at 09:06

## 2021-06-07 RX ADMIN — GUAIFENESIN AND DEXTROMETHORPHAN 10 ML: 100; 10 SYRUP ORAL at 08:06

## 2021-06-07 RX ADMIN — HYDRALAZINE HYDROCHLORIDE 100 MG: 25 TABLET, FILM COATED ORAL at 03:06

## 2021-06-07 RX ADMIN — LOSARTAN POTASSIUM 100 MG: 25 TABLET, FILM COATED ORAL at 09:06

## 2021-06-07 RX ADMIN — FUROSEMIDE 40 MG: 10 INJECTION, SOLUTION INTRAVENOUS at 01:06

## 2021-06-07 RX ADMIN — Medication 800 MG: at 10:06

## 2021-06-07 RX ADMIN — INSULIN DETEMIR 10 UNITS: 100 INJECTION, SOLUTION SUBCUTANEOUS at 09:06

## 2021-06-07 RX ADMIN — LIDOCAINE 1 PATCH: 50 PATCH TOPICAL at 03:06

## 2021-06-07 RX ADMIN — FUROSEMIDE 40 MG: 10 INJECTION, SOLUTION INTRAVENOUS at 12:06

## 2021-06-08 PROBLEM — Z79.4 TYPE 2 DIABETES MELLITUS WITH KIDNEY COMPLICATION, WITH LONG-TERM CURRENT USE OF INSULIN: Status: ACTIVE | Noted: 2017-03-10

## 2021-06-08 PROBLEM — E11.29 TYPE 2 DIABETES MELLITUS WITH KIDNEY COMPLICATION, WITH LONG-TERM CURRENT USE OF INSULIN: Status: ACTIVE | Noted: 2017-03-10

## 2021-06-08 LAB
ANION GAP SERPL CALC-SCNC: 11 MMOL/L (ref 8–16)
BUN SERPL-MCNC: 47 MG/DL (ref 8–23)
CALCIUM SERPL-MCNC: 9.2 MG/DL (ref 8.7–10.5)
CHLORIDE SERPL-SCNC: 98 MMOL/L (ref 95–110)
CO2 SERPL-SCNC: 28 MMOL/L (ref 23–29)
CREAT SERPL-MCNC: 2.4 MG/DL (ref 0.5–1.4)
EST. GFR  (AFRICAN AMERICAN): 30 ML/MIN/1.73 M^2
EST. GFR  (NON AFRICAN AMERICAN): 26 ML/MIN/1.73 M^2
GLUCOSE SERPL-MCNC: 163 MG/DL (ref 70–110)
POCT GLUCOSE: 155 MG/DL (ref 70–110)
POCT GLUCOSE: 167 MG/DL (ref 70–110)
POCT GLUCOSE: 173 MG/DL (ref 70–110)
POCT GLUCOSE: 247 MG/DL (ref 70–110)
POTASSIUM SERPL-SCNC: 3.7 MMOL/L (ref 3.5–5.1)
SODIUM SERPL-SCNC: 137 MMOL/L (ref 136–145)

## 2021-06-08 PROCEDURE — 94761 N-INVAS EAR/PLS OXIMETRY MLT: CPT

## 2021-06-08 PROCEDURE — 63600175 PHARM REV CODE 636 W HCPCS: Performed by: STUDENT IN AN ORGANIZED HEALTH CARE EDUCATION/TRAINING PROGRAM

## 2021-06-08 PROCEDURE — 25000003 PHARM REV CODE 250: Performed by: INTERNAL MEDICINE

## 2021-06-08 PROCEDURE — 21400001 HC TELEMETRY ROOM

## 2021-06-08 PROCEDURE — 80048 BASIC METABOLIC PNL TOTAL CA: CPT | Performed by: INTERNAL MEDICINE

## 2021-06-08 PROCEDURE — 27000221 HC OXYGEN, UP TO 24 HOURS

## 2021-06-08 PROCEDURE — 99900035 HC TECH TIME PER 15 MIN (STAT)

## 2021-06-08 PROCEDURE — 97530 THERAPEUTIC ACTIVITIES: CPT | Mod: CO

## 2021-06-08 PROCEDURE — 36415 COLL VENOUS BLD VENIPUNCTURE: CPT | Performed by: INTERNAL MEDICINE

## 2021-06-08 PROCEDURE — C9399 UNCLASSIFIED DRUGS OR BIOLOG: HCPCS | Performed by: INTERNAL MEDICINE

## 2021-06-08 PROCEDURE — 97116 GAIT TRAINING THERAPY: CPT

## 2021-06-08 PROCEDURE — 25000003 PHARM REV CODE 250: Performed by: HOSPITALIST

## 2021-06-08 PROCEDURE — 25000003 PHARM REV CODE 250: Performed by: STUDENT IN AN ORGANIZED HEALTH CARE EDUCATION/TRAINING PROGRAM

## 2021-06-08 PROCEDURE — 97110 THERAPEUTIC EXERCISES: CPT

## 2021-06-08 PROCEDURE — 94660 CPAP INITIATION&MGMT: CPT

## 2021-06-08 RX ORDER — LOSARTAN POTASSIUM 25 MG/1
100 TABLET ORAL DAILY
Status: DISCONTINUED | OUTPATIENT
Start: 2021-06-09 | End: 2021-06-09

## 2021-06-08 RX ADMIN — APIXABAN 5 MG: 5 TABLET, FILM COATED ORAL at 09:06

## 2021-06-08 RX ADMIN — PANTOPRAZOLE SODIUM 40 MG: 40 TABLET, DELAYED RELEASE ORAL at 09:06

## 2021-06-08 RX ADMIN — INSULIN DETEMIR 10 UNITS: 100 INJECTION, SOLUTION SUBCUTANEOUS at 09:06

## 2021-06-08 RX ADMIN — ACETAMINOPHEN 650 MG: 325 TABLET ORAL at 10:06

## 2021-06-08 RX ADMIN — LIDOCAINE 1 PATCH: 50 PATCH TOPICAL at 04:06

## 2021-06-08 RX ADMIN — Medication 800 MG: at 09:06

## 2021-06-08 RX ADMIN — FUROSEMIDE 40 MG: 10 INJECTION, SOLUTION INTRAVENOUS at 12:06

## 2021-06-08 RX ADMIN — TAMSULOSIN HYDROCHLORIDE 0.4 MG: 0.4 CAPSULE ORAL at 09:06

## 2021-06-08 RX ADMIN — OXYCODONE 5 MG: 5 TABLET ORAL at 12:06

## 2021-06-08 RX ADMIN — HYDRALAZINE HYDROCHLORIDE 100 MG: 25 TABLET, FILM COATED ORAL at 09:06

## 2021-06-08 RX ADMIN — ROSUVASTATIN CALCIUM 20 MG: 10 TABLET, FILM COATED ORAL at 09:06

## 2021-06-08 RX ADMIN — Medication 6 MG: at 10:06

## 2021-06-08 RX ADMIN — Medication 6 MG: at 12:06

## 2021-06-08 RX ADMIN — GUAIFENESIN AND DEXTROMETHORPHAN 10 ML: 100; 10 SYRUP ORAL at 09:06

## 2021-06-08 RX ADMIN — OXYCODONE 5 MG: 5 TABLET ORAL at 06:06

## 2021-06-08 RX ADMIN — ASPIRIN 81 MG CHEWABLE TABLET 81 MG: 81 TABLET CHEWABLE at 09:06

## 2021-06-08 RX ADMIN — INSULIN ASPART 2 UNITS: 100 INJECTION, SOLUTION INTRAVENOUS; SUBCUTANEOUS at 12:06

## 2021-06-08 RX ADMIN — METOPROLOL SUCCINATE 50 MG: 50 TABLET, FILM COATED, EXTENDED RELEASE ORAL at 09:06

## 2021-06-08 RX ADMIN — AMLODIPINE BESYLATE 10 MG: 5 TABLET ORAL at 09:06

## 2021-06-08 RX ADMIN — HYDRALAZINE HYDROCHLORIDE 100 MG: 25 TABLET, FILM COATED ORAL at 03:06

## 2021-06-09 LAB
ALBUMIN SERPL BCP-MCNC: 3.2 G/DL (ref 3.5–5.2)
ANION GAP SERPL CALC-SCNC: 10 MMOL/L (ref 8–16)
BUN SERPL-MCNC: 56 MG/DL (ref 8–23)
CALCIUM SERPL-MCNC: 9.3 MG/DL (ref 8.7–10.5)
CHLORIDE SERPL-SCNC: 99 MMOL/L (ref 95–110)
CO2 SERPL-SCNC: 27 MMOL/L (ref 23–29)
CREAT SERPL-MCNC: 2.7 MG/DL (ref 0.5–1.4)
EST. GFR  (AFRICAN AMERICAN): 26 ML/MIN/1.73 M^2
EST. GFR  (NON AFRICAN AMERICAN): 23 ML/MIN/1.73 M^2
GLUCOSE SERPL-MCNC: 151 MG/DL (ref 70–110)
PHOSPHATE SERPL-MCNC: 5.1 MG/DL (ref 2.7–4.5)
POCT GLUCOSE: 149 MG/DL (ref 70–110)
POCT GLUCOSE: 152 MG/DL (ref 70–110)
POCT GLUCOSE: 167 MG/DL (ref 70–110)
POCT GLUCOSE: 191 MG/DL (ref 70–110)
POTASSIUM SERPL-SCNC: 3.7 MMOL/L (ref 3.5–5.1)
SODIUM SERPL-SCNC: 136 MMOL/L (ref 136–145)

## 2021-06-09 PROCEDURE — 25000003 PHARM REV CODE 250: Performed by: HOSPITALIST

## 2021-06-09 PROCEDURE — 21400001 HC TELEMETRY ROOM

## 2021-06-09 PROCEDURE — 80069 RENAL FUNCTION PANEL: CPT | Performed by: INTERNAL MEDICINE

## 2021-06-09 PROCEDURE — 25000003 PHARM REV CODE 250: Performed by: STUDENT IN AN ORGANIZED HEALTH CARE EDUCATION/TRAINING PROGRAM

## 2021-06-09 PROCEDURE — C9399 UNCLASSIFIED DRUGS OR BIOLOG: HCPCS | Performed by: INTERNAL MEDICINE

## 2021-06-09 PROCEDURE — 25000003 PHARM REV CODE 250: Performed by: INTERNAL MEDICINE

## 2021-06-09 PROCEDURE — 27000221 HC OXYGEN, UP TO 24 HOURS

## 2021-06-09 PROCEDURE — 94761 N-INVAS EAR/PLS OXIMETRY MLT: CPT

## 2021-06-09 PROCEDURE — 36415 COLL VENOUS BLD VENIPUNCTURE: CPT | Performed by: INTERNAL MEDICINE

## 2021-06-09 RX ORDER — HYDROCODONE BITARTRATE AND ACETAMINOPHEN 5; 325 MG/1; MG/1
1 TABLET ORAL EVERY 6 HOURS PRN
Status: DISCONTINUED | OUTPATIENT
Start: 2021-06-09 | End: 2021-06-12 | Stop reason: HOSPADM

## 2021-06-09 RX ADMIN — Medication 800 MG: at 09:06

## 2021-06-09 RX ADMIN — APIXABAN 5 MG: 5 TABLET, FILM COATED ORAL at 09:06

## 2021-06-09 RX ADMIN — HYDRALAZINE HYDROCHLORIDE 100 MG: 25 TABLET, FILM COATED ORAL at 02:06

## 2021-06-09 RX ADMIN — HYDROCODONE BITARTRATE AND ACETAMINOPHEN 1 TABLET: 5; 325 TABLET ORAL at 10:06

## 2021-06-09 RX ADMIN — ROSUVASTATIN CALCIUM 20 MG: 10 TABLET, FILM COATED ORAL at 09:06

## 2021-06-09 RX ADMIN — APIXABAN 5 MG: 5 TABLET, FILM COATED ORAL at 10:06

## 2021-06-09 RX ADMIN — TAMSULOSIN HYDROCHLORIDE 0.4 MG: 0.4 CAPSULE ORAL at 09:06

## 2021-06-09 RX ADMIN — Medication 6 MG: at 10:06

## 2021-06-09 RX ADMIN — INSULIN DETEMIR 10 UNITS: 100 INJECTION, SOLUTION SUBCUTANEOUS at 09:06

## 2021-06-09 RX ADMIN — PANTOPRAZOLE SODIUM 40 MG: 40 TABLET, DELAYED RELEASE ORAL at 09:06

## 2021-06-09 RX ADMIN — METOPROLOL SUCCINATE 50 MG: 50 TABLET, FILM COATED, EXTENDED RELEASE ORAL at 09:06

## 2021-06-09 RX ADMIN — AMLODIPINE BESYLATE 10 MG: 5 TABLET ORAL at 09:06

## 2021-06-09 RX ADMIN — POLYETHYLENE GLYCOL 3350 17 G: 17 POWDER, FOR SOLUTION ORAL at 09:06

## 2021-06-09 RX ADMIN — HYDRALAZINE HYDROCHLORIDE 100 MG: 25 TABLET, FILM COATED ORAL at 09:06

## 2021-06-09 RX ADMIN — LIDOCAINE 1 PATCH: 50 PATCH TOPICAL at 04:06

## 2021-06-09 RX ADMIN — ASPIRIN 81 MG CHEWABLE TABLET 81 MG: 81 TABLET CHEWABLE at 09:06

## 2021-06-09 RX ADMIN — OXYCODONE 5 MG: 5 TABLET ORAL at 05:06

## 2021-06-09 RX ADMIN — OXYCODONE 5 MG: 5 TABLET ORAL at 02:06

## 2021-06-10 LAB
ANION GAP SERPL CALC-SCNC: 11 MMOL/L (ref 8–16)
BUN SERPL-MCNC: 62 MG/DL (ref 8–23)
CALCIUM SERPL-MCNC: 9 MG/DL (ref 8.7–10.5)
CHLORIDE SERPL-SCNC: 101 MMOL/L (ref 95–110)
CO2 SERPL-SCNC: 25 MMOL/L (ref 23–29)
CREAT SERPL-MCNC: 2.5 MG/DL (ref 0.5–1.4)
EST. GFR  (AFRICAN AMERICAN): 29 ML/MIN/1.73 M^2
EST. GFR  (NON AFRICAN AMERICAN): 25 ML/MIN/1.73 M^2
GLUCOSE SERPL-MCNC: 126 MG/DL (ref 70–110)
POCT GLUCOSE: 137 MG/DL (ref 70–110)
POCT GLUCOSE: 140 MG/DL (ref 70–110)
POCT GLUCOSE: 143 MG/DL (ref 70–110)
POCT GLUCOSE: 172 MG/DL (ref 70–110)
POTASSIUM SERPL-SCNC: 3.9 MMOL/L (ref 3.5–5.1)
SODIUM SERPL-SCNC: 137 MMOL/L (ref 136–145)

## 2021-06-10 PROCEDURE — 36415 COLL VENOUS BLD VENIPUNCTURE: CPT | Performed by: INTERNAL MEDICINE

## 2021-06-10 PROCEDURE — 97116 GAIT TRAINING THERAPY: CPT | Mod: CQ

## 2021-06-10 PROCEDURE — 80048 BASIC METABOLIC PNL TOTAL CA: CPT | Performed by: INTERNAL MEDICINE

## 2021-06-10 PROCEDURE — 25000003 PHARM REV CODE 250: Performed by: STUDENT IN AN ORGANIZED HEALTH CARE EDUCATION/TRAINING PROGRAM

## 2021-06-10 PROCEDURE — 99900035 HC TECH TIME PER 15 MIN (STAT)

## 2021-06-10 PROCEDURE — 25000242 PHARM REV CODE 250 ALT 637 W/ HCPCS: Performed by: STUDENT IN AN ORGANIZED HEALTH CARE EDUCATION/TRAINING PROGRAM

## 2021-06-10 PROCEDURE — 94761 N-INVAS EAR/PLS OXIMETRY MLT: CPT

## 2021-06-10 PROCEDURE — 25000003 PHARM REV CODE 250: Performed by: INTERNAL MEDICINE

## 2021-06-10 PROCEDURE — 94640 AIRWAY INHALATION TREATMENT: CPT

## 2021-06-10 PROCEDURE — 27000221 HC OXYGEN, UP TO 24 HOURS

## 2021-06-10 PROCEDURE — 21400001 HC TELEMETRY ROOM

## 2021-06-10 PROCEDURE — 97110 THERAPEUTIC EXERCISES: CPT | Mod: CQ

## 2021-06-10 RX ADMIN — IPRATROPIUM BROMIDE AND ALBUTEROL SULFATE 3 ML: .5; 3 SOLUTION RESPIRATORY (INHALATION) at 08:06

## 2021-06-10 RX ADMIN — ROSUVASTATIN CALCIUM 20 MG: 10 TABLET, FILM COATED ORAL at 09:06

## 2021-06-10 RX ADMIN — METOPROLOL SUCCINATE 50 MG: 50 TABLET, FILM COATED, EXTENDED RELEASE ORAL at 09:06

## 2021-06-10 RX ADMIN — TAMSULOSIN HYDROCHLORIDE 0.4 MG: 0.4 CAPSULE ORAL at 09:06

## 2021-06-10 RX ADMIN — PANTOPRAZOLE SODIUM 40 MG: 40 TABLET, DELAYED RELEASE ORAL at 09:06

## 2021-06-10 RX ADMIN — APIXABAN 5 MG: 5 TABLET, FILM COATED ORAL at 08:06

## 2021-06-10 RX ADMIN — INSULIN DETEMIR 10 UNITS: 100 INJECTION, SOLUTION SUBCUTANEOUS at 09:06

## 2021-06-10 RX ADMIN — POLYETHYLENE GLYCOL 3350 17 G: 17 POWDER, FOR SOLUTION ORAL at 09:06

## 2021-06-10 RX ADMIN — APIXABAN 5 MG: 5 TABLET, FILM COATED ORAL at 09:06

## 2021-06-10 RX ADMIN — HYDROCODONE BITARTRATE AND ACETAMINOPHEN 1 TABLET: 5; 325 TABLET ORAL at 05:06

## 2021-06-10 RX ADMIN — Medication 800 MG: at 09:06

## 2021-06-10 RX ADMIN — LIDOCAINE 1 PATCH: 50 PATCH TOPICAL at 05:06

## 2021-06-10 RX ADMIN — ASPIRIN 81 MG CHEWABLE TABLET 81 MG: 81 TABLET CHEWABLE at 09:06

## 2021-06-11 LAB
POCT GLUCOSE: 136 MG/DL (ref 70–110)
POCT GLUCOSE: 144 MG/DL (ref 70–110)
POCT GLUCOSE: 146 MG/DL (ref 70–110)
POCT GLUCOSE: 164 MG/DL (ref 70–110)

## 2021-06-11 PROCEDURE — 97535 SELF CARE MNGMENT TRAINING: CPT | Mod: CO

## 2021-06-11 PROCEDURE — 21400001 HC TELEMETRY ROOM

## 2021-06-11 PROCEDURE — 94761 N-INVAS EAR/PLS OXIMETRY MLT: CPT

## 2021-06-11 PROCEDURE — 99900035 HC TECH TIME PER 15 MIN (STAT)

## 2021-06-11 PROCEDURE — 25000003 PHARM REV CODE 250: Performed by: INTERNAL MEDICINE

## 2021-06-11 PROCEDURE — 25000003 PHARM REV CODE 250: Performed by: STUDENT IN AN ORGANIZED HEALTH CARE EDUCATION/TRAINING PROGRAM

## 2021-06-11 RX ADMIN — ASPIRIN 81 MG CHEWABLE TABLET 81 MG: 81 TABLET CHEWABLE at 08:06

## 2021-06-11 RX ADMIN — APIXABAN 5 MG: 5 TABLET, FILM COATED ORAL at 09:06

## 2021-06-11 RX ADMIN — LIDOCAINE 1 PATCH: 50 PATCH TOPICAL at 04:06

## 2021-06-11 RX ADMIN — POLYETHYLENE GLYCOL 3350 17 G: 17 POWDER, FOR SOLUTION ORAL at 08:06

## 2021-06-11 RX ADMIN — HYDROCODONE BITARTRATE AND ACETAMINOPHEN 1 TABLET: 5; 325 TABLET ORAL at 04:06

## 2021-06-11 RX ADMIN — ROSUVASTATIN CALCIUM 20 MG: 10 TABLET, FILM COATED ORAL at 08:06

## 2021-06-11 RX ADMIN — TAMSULOSIN HYDROCHLORIDE 0.4 MG: 0.4 CAPSULE ORAL at 08:06

## 2021-06-11 RX ADMIN — METOPROLOL SUCCINATE 50 MG: 50 TABLET, FILM COATED, EXTENDED RELEASE ORAL at 08:06

## 2021-06-11 RX ADMIN — INSULIN DETEMIR 10 UNITS: 100 INJECTION, SOLUTION SUBCUTANEOUS at 08:06

## 2021-06-11 RX ADMIN — PANTOPRAZOLE SODIUM 40 MG: 40 TABLET, DELAYED RELEASE ORAL at 08:06

## 2021-06-11 RX ADMIN — HYDROCODONE BITARTRATE AND ACETAMINOPHEN 1 TABLET: 5; 325 TABLET ORAL at 05:06

## 2021-06-11 RX ADMIN — Medication 800 MG: at 08:06

## 2021-06-11 RX ADMIN — HYDROCODONE BITARTRATE AND ACETAMINOPHEN 1 TABLET: 5; 325 TABLET ORAL at 10:06

## 2021-06-11 RX ADMIN — APIXABAN 5 MG: 5 TABLET, FILM COATED ORAL at 08:06

## 2021-06-12 VITALS
OXYGEN SATURATION: 99 % | BODY MASS INDEX: 32.56 KG/M2 | SYSTOLIC BLOOD PRESSURE: 130 MMHG | HEART RATE: 85 BPM | WEIGHT: 232.56 LBS | TEMPERATURE: 98 F | RESPIRATION RATE: 18 BRPM | HEIGHT: 71 IN | DIASTOLIC BLOOD PRESSURE: 80 MMHG

## 2021-06-12 PROBLEM — E87.6 DIURETIC-INDUCED HYPOKALEMIA: Status: RESOLVED | Noted: 2019-02-14 | Resolved: 2021-06-12

## 2021-06-12 PROBLEM — I50.33 ACUTE ON CHRONIC DIASTOLIC HEART FAILURE: Status: RESOLVED | Noted: 2020-03-20 | Resolved: 2021-06-12

## 2021-06-12 PROBLEM — T50.2X5A DIURETIC-INDUCED HYPOKALEMIA: Status: RESOLVED | Noted: 2019-02-14 | Resolved: 2021-06-12

## 2021-06-12 PROBLEM — J96.01 ACUTE RESPIRATORY FAILURE WITH HYPOXIA AND HYPERCARBIA: Status: RESOLVED | Noted: 2020-12-05 | Resolved: 2021-06-12

## 2021-06-12 PROBLEM — J96.02 ACUTE RESPIRATORY FAILURE WITH HYPOXIA AND HYPERCARBIA: Status: RESOLVED | Noted: 2020-12-05 | Resolved: 2021-06-12

## 2021-06-12 LAB — POCT GLUCOSE: 121 MG/DL (ref 70–110)

## 2021-06-12 PROCEDURE — 25000003 PHARM REV CODE 250: Performed by: INTERNAL MEDICINE

## 2021-06-12 PROCEDURE — C9399 UNCLASSIFIED DRUGS OR BIOLOG: HCPCS | Performed by: INTERNAL MEDICINE

## 2021-06-12 RX ORDER — FUROSEMIDE 40 MG/1
40 TABLET ORAL DAILY
Qty: 30 TABLET | Refills: 0 | Status: ON HOLD | OUTPATIENT
Start: 2021-06-13 | End: 2021-09-09

## 2021-06-12 RX ADMIN — METOPROLOL SUCCINATE 50 MG: 50 TABLET, FILM COATED, EXTENDED RELEASE ORAL at 08:06

## 2021-06-12 RX ADMIN — POLYETHYLENE GLYCOL 3350 17 G: 17 POWDER, FOR SOLUTION ORAL at 08:06

## 2021-06-12 RX ADMIN — ASPIRIN 81 MG CHEWABLE TABLET 81 MG: 81 TABLET CHEWABLE at 08:06

## 2021-06-12 RX ADMIN — HYDROCODONE BITARTRATE AND ACETAMINOPHEN 1 TABLET: 5; 325 TABLET ORAL at 05:06

## 2021-06-12 RX ADMIN — TAMSULOSIN HYDROCHLORIDE 0.4 MG: 0.4 CAPSULE ORAL at 08:06

## 2021-06-12 RX ADMIN — APIXABAN 5 MG: 5 TABLET, FILM COATED ORAL at 08:06

## 2021-06-12 RX ADMIN — PANTOPRAZOLE SODIUM 40 MG: 40 TABLET, DELAYED RELEASE ORAL at 08:06

## 2021-06-12 RX ADMIN — ROSUVASTATIN CALCIUM 20 MG: 10 TABLET, FILM COATED ORAL at 08:06

## 2021-06-12 RX ADMIN — INSULIN DETEMIR 10 UNITS: 100 INJECTION, SOLUTION SUBCUTANEOUS at 08:06

## 2021-06-14 ENCOUNTER — EXTERNAL HOME HEALTH (OUTPATIENT)
Dept: HOME HEALTH SERVICES | Facility: HOSPITAL | Age: 71
End: 2021-06-14
Payer: MEDICARE

## 2021-06-14 ENCOUNTER — PATIENT OUTREACH (OUTPATIENT)
Dept: ADMINISTRATIVE | Facility: CLINIC | Age: 71
End: 2021-06-14

## 2021-09-05 ENCOUNTER — HOSPITAL ENCOUNTER (INPATIENT)
Facility: HOSPITAL | Age: 71
LOS: 2 days | Discharge: HOME | DRG: 689 | End: 2021-09-09
Attending: EMERGENCY MEDICINE | Admitting: EMERGENCY MEDICINE
Payer: MEDICARE

## 2021-09-05 DIAGNOSIS — I50.9 CHF (CONGESTIVE HEART FAILURE): ICD-10-CM

## 2021-09-05 DIAGNOSIS — R78.81 BACTEREMIA: ICD-10-CM

## 2021-09-05 DIAGNOSIS — R79.89 ELEVATED TROPONIN: ICD-10-CM

## 2021-09-05 DIAGNOSIS — I50.9 ACUTE EXACERBATION OF CHF (CONGESTIVE HEART FAILURE): Primary | ICD-10-CM

## 2021-09-05 DIAGNOSIS — N30.01 ACUTE CYSTITIS WITH HEMATURIA: ICD-10-CM

## 2021-09-05 DIAGNOSIS — R06.02 SHORTNESS OF BREATH: ICD-10-CM

## 2021-09-05 DIAGNOSIS — I50.33 ACUTE ON CHRONIC DIASTOLIC CONGESTIVE HEART FAILURE: ICD-10-CM

## 2021-09-05 DIAGNOSIS — I16.0 HYPERTENSIVE URGENCY: ICD-10-CM

## 2021-09-05 DIAGNOSIS — R07.9 CHEST PAIN: ICD-10-CM

## 2021-09-05 PROBLEM — N39.0 UTI (URINARY TRACT INFECTION): Status: ACTIVE | Noted: 2021-09-05

## 2021-09-05 LAB
ALBUMIN SERPL BCP-MCNC: 3.4 G/DL (ref 3.5–5.2)
ALLENS TEST: ABNORMAL
ALP SERPL-CCNC: 98 U/L (ref 55–135)
ALT SERPL W/O P-5'-P-CCNC: 51 U/L (ref 10–44)
ANION GAP SERPL CALC-SCNC: 16 MMOL/L (ref 8–16)
AORTIC ROOT ANNULUS: 3.78 CM
AORTIC VALVE CUSP SEPERATION: 0.99 CM
AST SERPL-CCNC: 60 U/L (ref 10–40)
AV INDEX (PROSTH): 0.31
AV MEAN GRADIENT: 23 MMHG
AV PEAK GRADIENT: 36 MMHG
AV VALVE AREA: 1.54 CM2
AV VELOCITY RATIO: 0.31
B-OH-BUTYR BLD STRIP-SCNC: 0 MMOL/L (ref 0–0.5)
BACTERIA #/AREA URNS HPF: ABNORMAL /HPF
BASOPHILS # BLD AUTO: 0.11 K/UL (ref 0–0.2)
BASOPHILS NFR BLD: 0.8 % (ref 0–1.9)
BILIRUB SERPL-MCNC: 1.8 MG/DL (ref 0.1–1)
BILIRUB UR QL STRIP: NEGATIVE
BNP SERPL-MCNC: 732 PG/ML (ref 0–99)
BSA FOR ECHO PROCEDURE: 2.3 M2
BUN SERPL-MCNC: 21 MG/DL (ref 8–23)
CALCIUM SERPL-MCNC: 9.4 MG/DL (ref 8.7–10.5)
CHLORIDE SERPL-SCNC: 105 MMOL/L (ref 95–110)
CLARITY UR: ABNORMAL
CO2 SERPL-SCNC: 20 MMOL/L (ref 23–29)
COLOR UR: YELLOW
CREAT SERPL-MCNC: 1.8 MG/DL (ref 0.5–1.4)
CTP QC/QA: YES
CV ECHO LV RWT: 0.45 CM
D DIMER PPP IA.FEU-MCNC: 1.55 MG/L FEU
DELSYS: ABNORMAL
DIFFERENTIAL METHOD: ABNORMAL
DOP CALC AO PEAK VEL: 2.99 M/S
DOP CALC AO VTI: 52.07 CM
DOP CALC LVOT AREA: 5 CM2
DOP CALC LVOT DIAMETER: 2.53 CM
DOP CALC LVOT PEAK VEL: 0.92 M/S
DOP CALC LVOT STROKE VOLUME: 79.99 CM3
DOP CALCLVOT PEAK VEL VTI: 15.92 CM
E WAVE DECELERATION TIME: 156.31 MSEC
E/A RATIO: 2.8
ECHO LV POSTERIOR WALL: 1.13 CM (ref 0.6–1.1)
EJECTION FRACTION: 55 %
EOSINOPHIL # BLD AUTO: 0 K/UL (ref 0–0.5)
EOSINOPHIL NFR BLD: 0.1 % (ref 0–8)
ERYTHROCYTE [DISTWIDTH] IN BLOOD BY AUTOMATED COUNT: 13.9 % (ref 11.5–14.5)
EST. GFR  (AFRICAN AMERICAN): 43 ML/MIN/1.73 M^2
EST. GFR  (NON AFRICAN AMERICAN): 37 ML/MIN/1.73 M^2
ESTIMATED AVG GLUCOSE: 157 MG/DL (ref 68–131)
FIO2: 28
FLOW: 2
FRACTIONAL SHORTENING: 17 % (ref 28–44)
GLUCOSE SERPL-MCNC: 204 MG/DL (ref 70–110)
GLUCOSE UR QL STRIP: ABNORMAL
HBA1C MFR BLD: 7.1 % (ref 4–5.6)
HCO3 UR-SCNC: 28.3 MMOL/L (ref 24–28)
HCT VFR BLD AUTO: 42 % (ref 40–54)
HGB BLD-MCNC: 14.4 G/DL (ref 14–18)
HGB UR QL STRIP: ABNORMAL
HYALINE CASTS #/AREA URNS LPF: ABNORMAL /LPF
IMM GRANULOCYTES # BLD AUTO: 0.09 K/UL (ref 0–0.04)
IMM GRANULOCYTES NFR BLD AUTO: 0.7 % (ref 0–0.5)
INR PPP: 1.1 (ref 0.8–1.2)
INTERVENTRICULAR SEPTUM: 1.1 CM (ref 0.6–1.1)
IVRT: 76.12 MSEC
KETONES UR QL STRIP: NEGATIVE
LA MAJOR: 6.43 CM
LA MINOR: 6.16 CM
LA WIDTH: 4.53 CM
LACTATE SERPL-SCNC: 2 MMOL/L (ref 0.5–2.2)
LEFT ATRIUM SIZE: 5.11 CM
LEFT ATRIUM VOLUME INDEX: 55 ML/M2
LEFT ATRIUM VOLUME: 123.8 CM3
LEFT INTERNAL DIMENSION IN SYSTOLE: 4.19 CM (ref 2.1–4)
LEFT VENTRICLE DIASTOLIC VOLUME INDEX: 54.01 ML/M2
LEFT VENTRICLE DIASTOLIC VOLUME: 121.52 ML
LEFT VENTRICLE MASS INDEX: 96 G/M2
LEFT VENTRICLE SYSTOLIC VOLUME INDEX: 34.7 ML/M2
LEFT VENTRICLE SYSTOLIC VOLUME: 78.13 ML
LEFT VENTRICULAR INTERNAL DIMENSION IN DIASTOLE: 5.06 CM (ref 3.5–6)
LEFT VENTRICULAR MASS: 215.14 G
LEUKOCYTE ESTERASE UR QL STRIP: ABNORMAL
LIPASE SERPL-CCNC: 33 U/L (ref 4–60)
LV LATERAL E/E' RATIO: 14 M/S
LYMPHOCYTES # BLD AUTO: 0.9 K/UL (ref 1–4.8)
LYMPHOCYTES NFR BLD: 6.3 % (ref 18–48)
MAGNESIUM SERPL-MCNC: 1.6 MG/DL (ref 1.6–2.6)
MCH RBC QN AUTO: 27 PG (ref 27–31)
MCHC RBC AUTO-ENTMCNC: 34.3 G/DL (ref 32–36)
MCV RBC AUTO: 79 FL (ref 82–98)
MICROSCOPIC COMMENT: ABNORMAL
MODE: ABNORMAL
MONOCYTES # BLD AUTO: 1.1 K/UL (ref 0.3–1)
MONOCYTES NFR BLD: 7.6 % (ref 4–15)
MV PEAK A VEL: 0.55 M/S
MV PEAK E VEL: 1.54 M/S
MV STENOSIS PRESSURE HALF TIME: 45.33 MS
MV VALVE AREA P 1/2 METHOD: 4.85 CM2
NEUTROPHILS # BLD AUTO: 11.6 K/UL (ref 1.8–7.7)
NEUTROPHILS NFR BLD: 84.5 % (ref 38–73)
NITRITE UR QL STRIP: NEGATIVE
NRBC BLD-RTO: 0 /100 WBC
OSMOLALITY SERPL: 317 MOSM/KG (ref 280–300)
PCO2 BLDA: 38.6 MMHG (ref 35–45)
PH SMN: 7.47 [PH] (ref 7.35–7.45)
PH UR STRIP: 7 [PH] (ref 5–8)
PISA TR MAX VEL: 3.44 M/S
PLATELET # BLD AUTO: 193 K/UL (ref 150–450)
PMV BLD AUTO: 8.3 FL (ref 9.2–12.9)
PO2 BLDA: 76 MMHG (ref 80–100)
POC BE: 4 MMOL/L
POC SATURATED O2: 96 % (ref 95–100)
POC TCO2: 29 MMOL/L (ref 23–27)
POCT GLUCOSE: 149 MG/DL (ref 70–110)
POCT GLUCOSE: 190 MG/DL (ref 70–110)
POTASSIUM SERPL-SCNC: 3.7 MMOL/L (ref 3.5–5.1)
PROT SERPL-MCNC: 7.5 G/DL (ref 6–8.4)
PROT UR QL STRIP: ABNORMAL
PROTHROMBIN TIME: 11.5 SEC (ref 9–12.5)
PV PEAK VELOCITY: 1.05 CM/S
RA MAJOR: 5.84 CM
RA PRESSURE: 3 MMHG
RA WIDTH: 3.99 CM
RBC # BLD AUTO: 5.34 M/UL (ref 4.6–6.2)
RBC #/AREA URNS HPF: 6 /HPF (ref 0–4)
RIGHT VENTRICULAR END-DIASTOLIC DIMENSION: 3.65 CM
SAMPLE: ABNORMAL
SARS-COV-2 RDRP RESP QL NAA+PROBE: NEGATIVE
SINUS: 3.2 CM
SITE: ABNORMAL
SODIUM SERPL-SCNC: 141 MMOL/L (ref 136–145)
SP GR UR STRIP: 1.02 (ref 1–1.03)
SQUAMOUS #/AREA URNS HPF: 1 /HPF
STJ: 2.77 CM
TDI LATERAL: 0.11 M/S
TR MAX PG: 47 MMHG
TRICUSPID ANNULAR PLANE SYSTOLIC EXCURSION: 2.63 CM
TROPONIN I SERPL DL<=0.01 NG/ML-MCNC: 0.03 NG/ML (ref 0–0.03)
TROPONIN I SERPL DL<=0.01 NG/ML-MCNC: 0.04 NG/ML (ref 0–0.03)
TROPONIN I SERPL DL<=0.01 NG/ML-MCNC: 0.04 NG/ML (ref 0–0.03)
TSH SERPL DL<=0.005 MIU/L-ACNC: 0.89 UIU/ML (ref 0.4–4)
TV REST PULMONARY ARTERY PRESSURE: 50 MMHG
URN SPEC COLLECT METH UR: ABNORMAL
UROBILINOGEN UR STRIP-ACNC: NEGATIVE EU/DL
WBC # BLD AUTO: 13.73 K/UL (ref 3.9–12.7)
WBC #/AREA URNS HPF: >100 /HPF (ref 0–5)
WBC CLUMPS URNS QL MICRO: ABNORMAL

## 2021-09-05 PROCEDURE — 83930 ASSAY OF BLOOD OSMOLALITY: CPT | Performed by: EMERGENCY MEDICINE

## 2021-09-05 PROCEDURE — 83036 HEMOGLOBIN GLYCOSYLATED A1C: CPT | Performed by: PHYSICIAN ASSISTANT

## 2021-09-05 PROCEDURE — 93005 ELECTROCARDIOGRAM TRACING: CPT

## 2021-09-05 PROCEDURE — 80053 COMPREHEN METABOLIC PANEL: CPT | Performed by: EMERGENCY MEDICINE

## 2021-09-05 PROCEDURE — 83735 ASSAY OF MAGNESIUM: CPT | Performed by: EMERGENCY MEDICINE

## 2021-09-05 PROCEDURE — 63600175 PHARM REV CODE 636 W HCPCS: Performed by: NURSE PRACTITIONER

## 2021-09-05 PROCEDURE — 96372 THER/PROPH/DIAG INJ SC/IM: CPT | Mod: 59

## 2021-09-05 PROCEDURE — 36600 WITHDRAWAL OF ARTERIAL BLOOD: CPT

## 2021-09-05 PROCEDURE — 85379 FIBRIN DEGRADATION QUANT: CPT | Performed by: EMERGENCY MEDICINE

## 2021-09-05 PROCEDURE — U0002 COVID-19 LAB TEST NON-CDC: HCPCS | Performed by: EMERGENCY MEDICINE

## 2021-09-05 PROCEDURE — 96374 THER/PROPH/DIAG INJ IV PUSH: CPT | Mod: 59

## 2021-09-05 PROCEDURE — 99285 EMERGENCY DEPT VISIT HI MDM: CPT | Mod: 25

## 2021-09-05 PROCEDURE — 63600175 PHARM REV CODE 636 W HCPCS: Performed by: EMERGENCY MEDICINE

## 2021-09-05 PROCEDURE — 82010 KETONE BODYS QUAN: CPT | Performed by: EMERGENCY MEDICINE

## 2021-09-05 PROCEDURE — 96376 TX/PRO/DX INJ SAME DRUG ADON: CPT

## 2021-09-05 PROCEDURE — 93010 ELECTROCARDIOGRAM REPORT: CPT | Mod: ,,, | Performed by: INTERNAL MEDICINE

## 2021-09-05 PROCEDURE — 25000003 PHARM REV CODE 250: Performed by: EMERGENCY MEDICINE

## 2021-09-05 PROCEDURE — 96365 THER/PROPH/DIAG IV INF INIT: CPT

## 2021-09-05 PROCEDURE — 84443 ASSAY THYROID STIM HORMONE: CPT | Performed by: EMERGENCY MEDICINE

## 2021-09-05 PROCEDURE — 87077 CULTURE AEROBIC IDENTIFY: CPT | Performed by: EMERGENCY MEDICINE

## 2021-09-05 PROCEDURE — 81000 URINALYSIS NONAUTO W/SCOPE: CPT | Performed by: EMERGENCY MEDICINE

## 2021-09-05 PROCEDURE — 93010 EKG 12-LEAD: ICD-10-PCS | Mod: ,,, | Performed by: INTERNAL MEDICINE

## 2021-09-05 PROCEDURE — 85025 COMPLETE CBC W/AUTO DIFF WBC: CPT | Performed by: EMERGENCY MEDICINE

## 2021-09-05 PROCEDURE — 87088 URINE BACTERIA CULTURE: CPT | Performed by: EMERGENCY MEDICINE

## 2021-09-05 PROCEDURE — 99220 PR INITIAL OBSERVATION CARE,LEVL III: ICD-10-PCS | Mod: 25,,, | Performed by: INTERNAL MEDICINE

## 2021-09-05 PROCEDURE — 96375 TX/PRO/DX INJ NEW DRUG ADDON: CPT

## 2021-09-05 PROCEDURE — 87086 URINE CULTURE/COLONY COUNT: CPT | Performed by: EMERGENCY MEDICINE

## 2021-09-05 PROCEDURE — 83880 ASSAY OF NATRIURETIC PEPTIDE: CPT | Performed by: EMERGENCY MEDICINE

## 2021-09-05 PROCEDURE — 82962 GLUCOSE BLOOD TEST: CPT

## 2021-09-05 PROCEDURE — G0378 HOSPITAL OBSERVATION PER HR: HCPCS

## 2021-09-05 PROCEDURE — 87186 SC STD MICRODIL/AGAR DIL: CPT | Mod: 59 | Performed by: PHYSICIAN ASSISTANT

## 2021-09-05 PROCEDURE — 85610 PROTHROMBIN TIME: CPT | Performed by: EMERGENCY MEDICINE

## 2021-09-05 PROCEDURE — 99220 PR INITIAL OBSERVATION CARE,LEVL III: CPT | Mod: 25,,, | Performed by: INTERNAL MEDICINE

## 2021-09-05 PROCEDURE — 83605 ASSAY OF LACTIC ACID: CPT | Performed by: PHYSICIAN ASSISTANT

## 2021-09-05 PROCEDURE — 87040 BLOOD CULTURE FOR BACTERIA: CPT | Mod: 59 | Performed by: PHYSICIAN ASSISTANT

## 2021-09-05 PROCEDURE — 82803 BLOOD GASES ANY COMBINATION: CPT

## 2021-09-05 PROCEDURE — 84484 ASSAY OF TROPONIN QUANT: CPT | Mod: 91 | Performed by: NURSE PRACTITIONER

## 2021-09-05 PROCEDURE — 84484 ASSAY OF TROPONIN QUANT: CPT | Performed by: EMERGENCY MEDICINE

## 2021-09-05 PROCEDURE — 99900035 HC TECH TIME PER 15 MIN (STAT)

## 2021-09-05 PROCEDURE — 87077 CULTURE AEROBIC IDENTIFY: CPT | Mod: 59 | Performed by: PHYSICIAN ASSISTANT

## 2021-09-05 PROCEDURE — 87186 SC STD MICRODIL/AGAR DIL: CPT | Performed by: EMERGENCY MEDICINE

## 2021-09-05 PROCEDURE — 83690 ASSAY OF LIPASE: CPT | Performed by: EMERGENCY MEDICINE

## 2021-09-05 RX ORDER — ATORVASTATIN CALCIUM 40 MG/1
40 TABLET, FILM COATED ORAL DAILY
Status: DISCONTINUED | OUTPATIENT
Start: 2021-09-06 | End: 2021-09-09 | Stop reason: HOSPADM

## 2021-09-05 RX ORDER — IBUPROFEN 200 MG
24 TABLET ORAL
Status: DISCONTINUED | OUTPATIENT
Start: 2021-09-05 | End: 2021-09-09 | Stop reason: HOSPADM

## 2021-09-05 RX ORDER — POTASSIUM CHLORIDE 20 MEQ/1
2 TABLET, EXTENDED RELEASE ORAL DAILY
COMMUNITY
Start: 2015-04-29 | End: 2023-06-01 | Stop reason: DRUGHIGH

## 2021-09-05 RX ORDER — FUROSEMIDE 10 MG/ML
40 INJECTION INTRAMUSCULAR; INTRAVENOUS
Status: DISCONTINUED | OUTPATIENT
Start: 2021-09-05 | End: 2021-09-06

## 2021-09-05 RX ORDER — HUMAN INSULIN 100 [USP'U]/ML
INJECTION, SUSPENSION SUBCUTANEOUS
Status: ON HOLD | COMMUNITY
Start: 2021-05-31 | End: 2022-02-10 | Stop reason: HOSPADM

## 2021-09-05 RX ORDER — AMLODIPINE BESYLATE 5 MG/1
10 TABLET ORAL DAILY
Status: DISCONTINUED | OUTPATIENT
Start: 2021-09-06 | End: 2021-09-09 | Stop reason: HOSPADM

## 2021-09-05 RX ORDER — TALC
6 POWDER (GRAM) TOPICAL NIGHTLY PRN
Status: DISCONTINUED | OUTPATIENT
Start: 2021-09-05 | End: 2021-09-09 | Stop reason: HOSPADM

## 2021-09-05 RX ORDER — AMOXICILLIN 250 MG
1 CAPSULE ORAL ONCE
Status: DISCONTINUED | OUTPATIENT
Start: 2021-09-05 | End: 2021-09-09 | Stop reason: HOSPADM

## 2021-09-05 RX ORDER — IBUPROFEN 200 MG
16 TABLET ORAL
Status: DISCONTINUED | OUTPATIENT
Start: 2021-09-05 | End: 2021-09-09 | Stop reason: HOSPADM

## 2021-09-05 RX ORDER — AMLODIPINE BESYLATE 10 MG/1
10 TABLET ORAL DAILY
Status: ON HOLD | COMMUNITY
Start: 2021-08-17 | End: 2022-02-10 | Stop reason: HOSPADM

## 2021-09-05 RX ORDER — LOSARTAN POTASSIUM 100 MG/1
100 TABLET ORAL DAILY
Status: ON HOLD | COMMUNITY
End: 2021-11-27 | Stop reason: HOSPADM

## 2021-09-05 RX ORDER — CALCIUM CARBONATE 200(500)MG
500 TABLET,CHEWABLE ORAL 3 TIMES DAILY PRN
Status: DISCONTINUED | OUTPATIENT
Start: 2021-09-05 | End: 2021-09-09 | Stop reason: HOSPADM

## 2021-09-05 RX ORDER — SODIUM CHLORIDE 0.9 % (FLUSH) 0.9 %
10 SYRINGE (ML) INJECTION
Status: DISCONTINUED | OUTPATIENT
Start: 2021-09-05 | End: 2021-09-09 | Stop reason: HOSPADM

## 2021-09-05 RX ORDER — HYDROMORPHONE HYDROCHLORIDE 2 MG/ML
1 INJECTION, SOLUTION INTRAMUSCULAR; INTRAVENOUS; SUBCUTANEOUS
Status: COMPLETED | OUTPATIENT
Start: 2021-09-05 | End: 2021-09-05

## 2021-09-05 RX ORDER — LOSARTAN POTASSIUM AND HYDROCHLOROTHIAZIDE 25; 100 MG/1; MG/1
1 TABLET ORAL DAILY
Status: ON HOLD | COMMUNITY
End: 2022-02-10 | Stop reason: HOSPADM

## 2021-09-05 RX ORDER — ONDANSETRON 2 MG/ML
4 INJECTION INTRAMUSCULAR; INTRAVENOUS
Status: COMPLETED | OUTPATIENT
Start: 2021-09-05 | End: 2021-09-05

## 2021-09-05 RX ORDER — FUROSEMIDE 10 MG/ML
80 INJECTION INTRAMUSCULAR; INTRAVENOUS
Status: COMPLETED | OUTPATIENT
Start: 2021-09-05 | End: 2021-09-05

## 2021-09-05 RX ORDER — MORPHINE SULFATE 4 MG/ML
6 INJECTION, SOLUTION INTRAMUSCULAR; INTRAVENOUS
Status: COMPLETED | OUTPATIENT
Start: 2021-09-05 | End: 2021-09-05

## 2021-09-05 RX ORDER — METOLAZONE 5 MG/1
TABLET ORAL
Status: ON HOLD | COMMUNITY
Start: 2021-07-26 | End: 2023-06-06 | Stop reason: HOSPADM

## 2021-09-05 RX ORDER — FAMOTIDINE 40 MG/1
40 TABLET, FILM COATED ORAL DAILY
Status: ON HOLD | COMMUNITY
Start: 2021-05-10 | End: 2023-01-06 | Stop reason: HOSPADM

## 2021-09-05 RX ORDER — METOPROLOL SUCCINATE 50 MG/1
50 TABLET, EXTENDED RELEASE ORAL DAILY
Status: DISCONTINUED | OUTPATIENT
Start: 2021-09-06 | End: 2021-09-09 | Stop reason: HOSPADM

## 2021-09-05 RX ORDER — FLUTICASONE PROPIONATE AND SALMETEROL 100; 50 UG/1; UG/1
POWDER RESPIRATORY (INHALATION)
Status: ON HOLD | COMMUNITY
Start: 2021-07-13 | End: 2022-02-10 | Stop reason: HOSPADM

## 2021-09-05 RX ORDER — TAMSULOSIN HYDROCHLORIDE 0.4 MG/1
0.4 CAPSULE ORAL DAILY
Status: DISCONTINUED | OUTPATIENT
Start: 2021-09-06 | End: 2021-09-09 | Stop reason: HOSPADM

## 2021-09-05 RX ORDER — LOSARTAN POTASSIUM 25 MG/1
100 TABLET ORAL DAILY
Status: DISCONTINUED | OUTPATIENT
Start: 2021-09-06 | End: 2021-09-07

## 2021-09-05 RX ORDER — PROMETHAZINE HYDROCHLORIDE 25 MG/ML
25 INJECTION, SOLUTION INTRAMUSCULAR; INTRAVENOUS
Status: COMPLETED | OUTPATIENT
Start: 2021-09-05 | End: 2021-09-05

## 2021-09-05 RX ORDER — TRAMADOL HYDROCHLORIDE 50 MG/1
50 TABLET ORAL 4 TIMES DAILY PRN
Status: ON HOLD | COMMUNITY
Start: 2021-06-16 | End: 2021-11-27 | Stop reason: HOSPADM

## 2021-09-05 RX ORDER — ALBUTEROL SULFATE 90 UG/1
2 AEROSOL, METERED RESPIRATORY (INHALATION) EVERY 4 HOURS PRN
Status: ON HOLD | COMMUNITY
Start: 2021-06-15 | End: 2021-12-15 | Stop reason: SDUPTHER

## 2021-09-05 RX ORDER — ACETAMINOPHEN 500 MG
500 TABLET ORAL EVERY 6 HOURS PRN
Status: DISCONTINUED | OUTPATIENT
Start: 2021-09-05 | End: 2021-09-06

## 2021-09-05 RX ORDER — SPIRONOLACTONE 25 MG/1
TABLET ORAL
Status: ON HOLD | COMMUNITY
Start: 2021-06-01 | End: 2022-02-10 | Stop reason: HOSPADM

## 2021-09-05 RX ORDER — INSULIN ASPART 100 [IU]/ML
0-5 INJECTION, SOLUTION INTRAVENOUS; SUBCUTANEOUS
Status: DISCONTINUED | OUTPATIENT
Start: 2021-09-05 | End: 2021-09-09 | Stop reason: HOSPADM

## 2021-09-05 RX ORDER — AMIODARONE HYDROCHLORIDE 200 MG/1
200 TABLET ORAL DAILY
Status: ON HOLD | COMMUNITY
Start: 2021-06-22 | End: 2022-02-10 | Stop reason: SDUPTHER

## 2021-09-05 RX ORDER — HYDROMORPHONE HYDROCHLORIDE 2 MG/ML
2 INJECTION, SOLUTION INTRAMUSCULAR; INTRAVENOUS; SUBCUTANEOUS
Status: DISCONTINUED | OUTPATIENT
Start: 2021-09-05 | End: 2021-09-05

## 2021-09-05 RX ORDER — GLUCAGON 1 MG
1 KIT INJECTION
Status: DISCONTINUED | OUTPATIENT
Start: 2021-09-05 | End: 2021-09-09 | Stop reason: HOSPADM

## 2021-09-05 RX ADMIN — MORPHINE SULFATE 6 MG: 4 INJECTION, SOLUTION INTRAMUSCULAR; INTRAVENOUS at 05:09

## 2021-09-05 RX ADMIN — PROMETHAZINE HYDROCHLORIDE 25 MG: 25 INJECTION INTRAMUSCULAR; INTRAVENOUS at 06:09

## 2021-09-05 RX ADMIN — ONDANSETRON 4 MG: 2 INJECTION INTRAMUSCULAR; INTRAVENOUS at 05:09

## 2021-09-05 RX ADMIN — HYDROMORPHONE HYDROCHLORIDE 1 MG: 2 INJECTION INTRAMUSCULAR; INTRAVENOUS; SUBCUTANEOUS at 07:09

## 2021-09-05 RX ADMIN — CEFTRIAXONE 2 G: 2 INJECTION, POWDER, FOR SOLUTION INTRAMUSCULAR; INTRAVENOUS at 06:09

## 2021-09-05 RX ADMIN — FUROSEMIDE 80 MG: 10 INJECTION, SOLUTION INTRAMUSCULAR; INTRAVENOUS at 05:09

## 2021-09-05 RX ADMIN — FUROSEMIDE 40 MG: 10 INJECTION, SOLUTION INTRAMUSCULAR; INTRAVENOUS at 01:09

## 2021-09-06 LAB
ALBUMIN SERPL BCP-MCNC: 2.7 G/DL (ref 3.5–5.2)
ALP SERPL-CCNC: 99 U/L (ref 55–135)
ALT SERPL W/O P-5'-P-CCNC: 34 U/L (ref 10–44)
ANION GAP SERPL CALC-SCNC: 10 MMOL/L (ref 8–16)
AST SERPL-CCNC: 29 U/L (ref 10–40)
BASOPHILS # BLD AUTO: 0.14 K/UL (ref 0–0.2)
BASOPHILS NFR BLD: 1 % (ref 0–1.9)
BILIRUB SERPL-MCNC: 1.6 MG/DL (ref 0.1–1)
BUN SERPL-MCNC: 19 MG/DL (ref 8–23)
CALCIUM SERPL-MCNC: 8.9 MG/DL (ref 8.7–10.5)
CHLORIDE SERPL-SCNC: 103 MMOL/L (ref 95–110)
CO2 SERPL-SCNC: 27 MMOL/L (ref 23–29)
CREAT SERPL-MCNC: 1.7 MG/DL (ref 0.5–1.4)
DIFFERENTIAL METHOD: ABNORMAL
EOSINOPHIL # BLD AUTO: 0.1 K/UL (ref 0–0.5)
EOSINOPHIL NFR BLD: 0.6 % (ref 0–8)
ERYTHROCYTE [DISTWIDTH] IN BLOOD BY AUTOMATED COUNT: 13.7 % (ref 11.5–14.5)
EST. GFR  (AFRICAN AMERICAN): 46 ML/MIN/1.73 M^2
EST. GFR  (NON AFRICAN AMERICAN): 40 ML/MIN/1.73 M^2
GLUCOSE SERPL-MCNC: 126 MG/DL (ref 70–110)
HCT VFR BLD AUTO: 44 % (ref 40–54)
HGB BLD-MCNC: 14.7 G/DL (ref 14–18)
IMM GRANULOCYTES # BLD AUTO: 0.06 K/UL (ref 0–0.04)
IMM GRANULOCYTES NFR BLD AUTO: 0.4 % (ref 0–0.5)
LYMPHOCYTES # BLD AUTO: 1.1 K/UL (ref 1–4.8)
LYMPHOCYTES NFR BLD: 8.3 % (ref 18–48)
MCH RBC QN AUTO: 26.9 PG (ref 27–31)
MCHC RBC AUTO-ENTMCNC: 33.4 G/DL (ref 32–36)
MCV RBC AUTO: 80 FL (ref 82–98)
MONOCYTES # BLD AUTO: 1 K/UL (ref 0.3–1)
MONOCYTES NFR BLD: 7.2 % (ref 4–15)
NEUTROPHILS # BLD AUTO: 11.2 K/UL (ref 1.8–7.7)
NEUTROPHILS NFR BLD: 82.5 % (ref 38–73)
NRBC BLD-RTO: 0 /100 WBC
PLATELET # BLD AUTO: 153 K/UL (ref 150–450)
PMV BLD AUTO: 8.6 FL (ref 9.2–12.9)
POCT GLUCOSE: 162 MG/DL (ref 70–110)
POCT GLUCOSE: 164 MG/DL (ref 70–110)
POTASSIUM SERPL-SCNC: 3.4 MMOL/L (ref 3.5–5.1)
PROT SERPL-MCNC: 6.8 G/DL (ref 6–8.4)
RBC # BLD AUTO: 5.47 M/UL (ref 4.6–6.2)
SODIUM SERPL-SCNC: 140 MMOL/L (ref 136–145)
TROPONIN I SERPL DL<=0.01 NG/ML-MCNC: 0.02 NG/ML (ref 0–0.03)
TROPONIN I SERPL DL<=0.01 NG/ML-MCNC: 0.06 NG/ML (ref 0–0.03)
WBC # BLD AUTO: 13.59 K/UL (ref 3.9–12.7)

## 2021-09-06 PROCEDURE — 63600175 PHARM REV CODE 636 W HCPCS: Performed by: NURSE PRACTITIONER

## 2021-09-06 PROCEDURE — 96375 TX/PRO/DX INJ NEW DRUG ADDON: CPT

## 2021-09-06 PROCEDURE — 93005 ELECTROCARDIOGRAM TRACING: CPT

## 2021-09-06 PROCEDURE — 96376 TX/PRO/DX INJ SAME DRUG ADON: CPT

## 2021-09-06 PROCEDURE — 85025 COMPLETE CBC W/AUTO DIFF WBC: CPT | Performed by: PHYSICIAN ASSISTANT

## 2021-09-06 PROCEDURE — 25000003 PHARM REV CODE 250: Performed by: PHYSICIAN ASSISTANT

## 2021-09-06 PROCEDURE — 25000003 PHARM REV CODE 250: Performed by: NURSE PRACTITIONER

## 2021-09-06 PROCEDURE — 84484 ASSAY OF TROPONIN QUANT: CPT | Mod: 91 | Performed by: PHYSICIAN ASSISTANT

## 2021-09-06 PROCEDURE — G0378 HOSPITAL OBSERVATION PER HR: HCPCS

## 2021-09-06 PROCEDURE — 36415 COLL VENOUS BLD VENIPUNCTURE: CPT | Performed by: PHYSICIAN ASSISTANT

## 2021-09-06 PROCEDURE — 63600175 PHARM REV CODE 636 W HCPCS: Performed by: PHYSICIAN ASSISTANT

## 2021-09-06 PROCEDURE — 96366 THER/PROPH/DIAG IV INF ADDON: CPT

## 2021-09-06 PROCEDURE — 80053 COMPREHEN METABOLIC PANEL: CPT | Performed by: PHYSICIAN ASSISTANT

## 2021-09-06 PROCEDURE — 63600175 PHARM REV CODE 636 W HCPCS: Performed by: HOSPITALIST

## 2021-09-06 PROCEDURE — 25000003 PHARM REV CODE 250: Performed by: HOSPITALIST

## 2021-09-06 RX ORDER — ACETAMINOPHEN 325 MG/1
650 TABLET ORAL EVERY 6 HOURS PRN
Status: DISCONTINUED | OUTPATIENT
Start: 2021-09-07 | End: 2021-09-09 | Stop reason: HOSPADM

## 2021-09-06 RX ORDER — FUROSEMIDE 40 MG/1
40 TABLET ORAL DAILY
Status: DISCONTINUED | OUTPATIENT
Start: 2021-09-07 | End: 2021-09-07

## 2021-09-06 RX ORDER — POTASSIUM CHLORIDE 20 MEQ/1
20 TABLET, EXTENDED RELEASE ORAL ONCE
Status: COMPLETED | OUTPATIENT
Start: 2021-09-06 | End: 2021-09-06

## 2021-09-06 RX ADMIN — FUROSEMIDE 40 MG: 10 INJECTION, SOLUTION INTRAMUSCULAR; INTRAVENOUS at 01:09

## 2021-09-06 RX ADMIN — CEFTRIAXONE 2 G: 2 INJECTION, POWDER, FOR SOLUTION INTRAMUSCULAR; INTRAVENOUS at 07:09

## 2021-09-06 RX ADMIN — ACETAMINOPHEN 500 MG: 500 TABLET, FILM COATED ORAL at 09:09

## 2021-09-06 RX ADMIN — ATORVASTATIN CALCIUM 40 MG: 40 TABLET, FILM COATED ORAL at 10:09

## 2021-09-06 RX ADMIN — AMLODIPINE BESYLATE 10 MG: 5 TABLET ORAL at 10:09

## 2021-09-06 RX ADMIN — TAMSULOSIN HYDROCHLORIDE 0.4 MG: 0.4 CAPSULE ORAL at 10:09

## 2021-09-06 RX ADMIN — METOPROLOL SUCCINATE 50 MG: 50 TABLET, EXTENDED RELEASE ORAL at 10:09

## 2021-09-06 RX ADMIN — APIXABAN 5 MG: 5 TABLET, FILM COATED ORAL at 10:09

## 2021-09-06 RX ADMIN — LOSARTAN POTASSIUM 100 MG: 25 TABLET, FILM COATED ORAL at 10:09

## 2021-09-06 RX ADMIN — POTASSIUM CHLORIDE 20 MEQ: 20 TABLET, EXTENDED RELEASE ORAL at 10:09

## 2021-09-06 RX ADMIN — CALCIUM CARBONATE (ANTACID) CHEW TAB 500 MG 500 MG: 500 CHEW TAB at 02:09

## 2021-09-06 RX ADMIN — APIXABAN 5 MG: 5 TABLET, FILM COATED ORAL at 12:09

## 2021-09-06 RX ADMIN — FUROSEMIDE 40 MG: 10 INJECTION, SOLUTION INTRAMUSCULAR; INTRAVENOUS at 02:09

## 2021-09-06 RX ADMIN — VANCOMYCIN HYDROCHLORIDE 2000 MG: 10 INJECTION, POWDER, LYOPHILIZED, FOR SOLUTION INTRAVENOUS at 10:09

## 2021-09-06 RX ADMIN — APIXABAN 5 MG: 5 TABLET, FILM COATED ORAL at 09:09

## 2021-09-06 RX ADMIN — ACETAMINOPHEN 500 MG: 500 TABLET, FILM COATED ORAL at 01:09

## 2021-09-07 PROBLEM — R78.81 BACTEREMIA DUE TO GRAM-POSITIVE BACTERIA: Status: ACTIVE | Noted: 2021-09-07

## 2021-09-07 LAB
ALBUMIN SERPL BCP-MCNC: 2.5 G/DL (ref 3.5–5.2)
ALP SERPL-CCNC: 87 U/L (ref 55–135)
ALT SERPL W/O P-5'-P-CCNC: 22 U/L (ref 10–44)
ANION GAP SERPL CALC-SCNC: 13 MMOL/L (ref 8–16)
AORTIC ROOT ANNULUS: 3.82 CM
AORTIC VALVE CUSP SEPERATION: 1.21 CM
AST SERPL-CCNC: 22 U/L (ref 10–40)
AV INDEX (PROSTH): 0.26
AV MEAN GRADIENT: 20 MMHG
AV PEAK GRADIENT: 32 MMHG
AV VALVE AREA: 1.28 CM2
AV VELOCITY RATIO: 0.32
BACTERIA UR CULT: ABNORMAL
BASOPHILS # BLD AUTO: 0.09 K/UL (ref 0–0.2)
BASOPHILS NFR BLD: 1 % (ref 0–1.9)
BILIRUB SERPL-MCNC: 0.9 MG/DL (ref 0.1–1)
BSA FOR ECHO PROCEDURE: 2.3 M2
BUN SERPL-MCNC: 27 MG/DL (ref 8–23)
CALCIUM SERPL-MCNC: 8.5 MG/DL (ref 8.7–10.5)
CHLORIDE SERPL-SCNC: 100 MMOL/L (ref 95–110)
CO2 SERPL-SCNC: 22 MMOL/L (ref 23–29)
CREAT SERPL-MCNC: 2.3 MG/DL (ref 0.5–1.4)
CV ECHO LV RWT: 0.62 CM
DIFFERENTIAL METHOD: ABNORMAL
DOP CALC AO PEAK VEL: 2.81 M/S
DOP CALC AO VTI: 58.66 CM
DOP CALC LVOT AREA: 4.9 CM2
DOP CALC LVOT DIAMETER: 2.5 CM
DOP CALC LVOT PEAK VEL: 0.9 M/S
DOP CALC LVOT STROKE VOLUME: 74.82 CM3
DOP CALCLVOT PEAK VEL VTI: 15.25 CM
E WAVE DECELERATION TIME: 138.53 MSEC
E/A RATIO: 2.49
ECHO LV POSTERIOR WALL: 1.46 CM (ref 0.6–1.1)
EJECTION FRACTION: 60 %
EOSINOPHIL # BLD AUTO: 0.1 K/UL (ref 0–0.5)
EOSINOPHIL NFR BLD: 1.1 % (ref 0–8)
ERYTHROCYTE [DISTWIDTH] IN BLOOD BY AUTOMATED COUNT: 13.8 % (ref 11.5–14.5)
EST. GFR  (AFRICAN AMERICAN): 32 ML/MIN/1.73 M^2
EST. GFR  (NON AFRICAN AMERICAN): 28 ML/MIN/1.73 M^2
FRACTIONAL SHORTENING: 36 % (ref 28–44)
GLUCOSE SERPL-MCNC: 122 MG/DL (ref 70–110)
HCT VFR BLD AUTO: 43.2 % (ref 40–54)
HGB BLD-MCNC: 14 G/DL (ref 14–18)
IMM GRANULOCYTES # BLD AUTO: 0.04 K/UL (ref 0–0.04)
IMM GRANULOCYTES NFR BLD AUTO: 0.5 % (ref 0–0.5)
INTERVENTRICULAR SEPTUM: 1.51 CM (ref 0.6–1.1)
IVRT: 62.28 MSEC
LA MAJOR: 4.97 CM
LA MINOR: 6.16 CM
LA WIDTH: 5.66 CM
LEFT ATRIUM SIZE: 4.72 CM
LEFT ATRIUM VOLUME INDEX: 55.5 ML/M2
LEFT ATRIUM VOLUME: 124.92 CM3
LEFT INTERNAL DIMENSION IN SYSTOLE: 3.01 CM (ref 2.1–4)
LEFT VENTRICLE DIASTOLIC VOLUME INDEX: 44.98 ML/M2
LEFT VENTRICLE DIASTOLIC VOLUME: 101.21 ML
LEFT VENTRICLE MASS INDEX: 128 G/M2
LEFT VENTRICLE SYSTOLIC VOLUME INDEX: 15.6 ML/M2
LEFT VENTRICLE SYSTOLIC VOLUME: 35.18 ML
LEFT VENTRICULAR INTERNAL DIMENSION IN DIASTOLE: 4.68 CM (ref 3.5–6)
LEFT VENTRICULAR MASS: 287.8 G
LV SEPTAL E/E' RATIO: 44 M/S
LYMPHOCYTES # BLD AUTO: 0.9 K/UL (ref 1–4.8)
LYMPHOCYTES NFR BLD: 10.1 % (ref 18–48)
MCH RBC QN AUTO: 27 PG (ref 27–31)
MCHC RBC AUTO-ENTMCNC: 32.4 G/DL (ref 32–36)
MCV RBC AUTO: 83 FL (ref 82–98)
MONOCYTES # BLD AUTO: 1 K/UL (ref 0.3–1)
MONOCYTES NFR BLD: 11.1 % (ref 4–15)
MV PEAK A VEL: 0.53 M/S
MV PEAK E VEL: 1.32 M/S
NEUTROPHILS # BLD AUTO: 6.7 K/UL (ref 1.8–7.7)
NEUTROPHILS NFR BLD: 76.2 % (ref 38–73)
NRBC BLD-RTO: 0 /100 WBC
PISA TR MAX VEL: 3.52 M/S
PLATELET # BLD AUTO: 135 K/UL (ref 150–450)
PMV BLD AUTO: 8.8 FL (ref 9.2–12.9)
POCT GLUCOSE: 120 MG/DL (ref 70–110)
POCT GLUCOSE: 154 MG/DL (ref 70–110)
POCT GLUCOSE: 163 MG/DL (ref 70–110)
POCT GLUCOSE: 167 MG/DL (ref 70–110)
POTASSIUM SERPL-SCNC: 3.8 MMOL/L (ref 3.5–5.1)
PROT SERPL-MCNC: 6.4 G/DL (ref 6–8.4)
PULM VEIN S/D RATIO: 0.63
PV PEAK D VEL: 0.59 M/S
PV PEAK S VEL: 0.37 M/S
PV PEAK VELOCITY: 1.22 CM/S
RA MAJOR: 5.06 CM
RA PRESSURE: 3 MMHG
RA WIDTH: 3.73 CM
RBC # BLD AUTO: 5.19 M/UL (ref 4.6–6.2)
RIGHT VENTRICULAR END-DIASTOLIC DIMENSION: 4.43 CM
RV TISSUE DOPPLER FREE WALL SYSTOLIC VELOCITY 1 (APICAL 4 CHAMBER VIEW): 7.91 CM/S
SINUS: 3.74 CM
SODIUM SERPL-SCNC: 135 MMOL/L (ref 136–145)
STJ: 3.01 CM
TDI SEPTAL: 0.03 M/S
TR MAX PG: 50 MMHG
TRICUSPID ANNULAR PLANE SYSTOLIC EXCURSION: 2.19 CM
TV REST PULMONARY ARTERY PRESSURE: 53 MMHG
VANCOMYCIN TROUGH SERPL-MCNC: 32.8 UG/ML (ref 10–22)
WBC # BLD AUTO: 8.75 K/UL (ref 3.9–12.7)

## 2021-09-07 PROCEDURE — 80053 COMPREHEN METABOLIC PANEL: CPT | Performed by: PHYSICIAN ASSISTANT

## 2021-09-07 PROCEDURE — 87040 BLOOD CULTURE FOR BACTERIA: CPT | Performed by: NURSE PRACTITIONER

## 2021-09-07 PROCEDURE — 36415 COLL VENOUS BLD VENIPUNCTURE: CPT | Performed by: PHYSICIAN ASSISTANT

## 2021-09-07 PROCEDURE — 96376 TX/PRO/DX INJ SAME DRUG ADON: CPT

## 2021-09-07 PROCEDURE — 85025 COMPLETE CBC W/AUTO DIFF WBC: CPT | Performed by: PHYSICIAN ASSISTANT

## 2021-09-07 PROCEDURE — 11000001 HC ACUTE MED/SURG PRIVATE ROOM

## 2021-09-07 PROCEDURE — 25000003 PHARM REV CODE 250: Performed by: PHYSICIAN ASSISTANT

## 2021-09-07 PROCEDURE — 97165 OT EVAL LOW COMPLEX 30 MIN: CPT

## 2021-09-07 PROCEDURE — 63600175 PHARM REV CODE 636 W HCPCS: Performed by: INTERNAL MEDICINE

## 2021-09-07 PROCEDURE — 63600175 PHARM REV CODE 636 W HCPCS: Performed by: PHYSICIAN ASSISTANT

## 2021-09-07 PROCEDURE — 97161 PT EVAL LOW COMPLEX 20 MIN: CPT

## 2021-09-07 PROCEDURE — 25000003 PHARM REV CODE 250: Performed by: INTERNAL MEDICINE

## 2021-09-07 PROCEDURE — 80202 ASSAY OF VANCOMYCIN: CPT | Performed by: HOSPITALIST

## 2021-09-07 RX ORDER — BENZONATATE 100 MG/1
100 CAPSULE ORAL 3 TIMES DAILY PRN
Status: DISCONTINUED | OUTPATIENT
Start: 2021-09-07 | End: 2021-09-09 | Stop reason: HOSPADM

## 2021-09-07 RX ADMIN — METOPROLOL SUCCINATE 50 MG: 50 TABLET, EXTENDED RELEASE ORAL at 09:09

## 2021-09-07 RX ADMIN — BENZONATATE 100 MG: 100 CAPSULE ORAL at 08:09

## 2021-09-07 RX ADMIN — FUROSEMIDE 40 MG: 40 TABLET ORAL at 09:09

## 2021-09-07 RX ADMIN — ATORVASTATIN CALCIUM 40 MG: 40 TABLET, FILM COATED ORAL at 09:09

## 2021-09-07 RX ADMIN — APIXABAN 5 MG: 5 TABLET, FILM COATED ORAL at 09:09

## 2021-09-07 RX ADMIN — AMLODIPINE BESYLATE 10 MG: 5 TABLET ORAL at 09:09

## 2021-09-07 RX ADMIN — APIXABAN 5 MG: 5 TABLET, FILM COATED ORAL at 08:09

## 2021-09-07 RX ADMIN — CEFTRIAXONE 2 G: 2 INJECTION, POWDER, FOR SOLUTION INTRAMUSCULAR; INTRAVENOUS at 05:09

## 2021-09-07 RX ADMIN — VANCOMYCIN HYDROCHLORIDE 1750 MG: 10 INJECTION, POWDER, LYOPHILIZED, FOR SOLUTION INTRAVENOUS at 10:09

## 2021-09-07 RX ADMIN — TAMSULOSIN HYDROCHLORIDE 0.4 MG: 0.4 CAPSULE ORAL at 09:09

## 2021-09-07 RX ADMIN — LOSARTAN POTASSIUM 100 MG: 25 TABLET, FILM COATED ORAL at 09:09

## 2021-09-08 LAB
ALBUMIN SERPL BCP-MCNC: 2.7 G/DL (ref 3.5–5.2)
ALP SERPL-CCNC: 95 U/L (ref 55–135)
ALT SERPL W/O P-5'-P-CCNC: 17 U/L (ref 10–44)
ANION GAP SERPL CALC-SCNC: 11 MMOL/L (ref 8–16)
AST SERPL-CCNC: 23 U/L (ref 10–40)
BASOPHILS # BLD AUTO: 0.11 K/UL (ref 0–0.2)
BASOPHILS NFR BLD: 1.7 % (ref 0–1.9)
BILIRUB SERPL-MCNC: 0.6 MG/DL (ref 0.1–1)
BUN SERPL-MCNC: 34 MG/DL (ref 8–23)
CALCIUM SERPL-MCNC: 8.6 MG/DL (ref 8.7–10.5)
CHLORIDE SERPL-SCNC: 98 MMOL/L (ref 95–110)
CO2 SERPL-SCNC: 24 MMOL/L (ref 23–29)
CREAT SERPL-MCNC: 2.4 MG/DL (ref 0.5–1.4)
DIFFERENTIAL METHOD: ABNORMAL
EOSINOPHIL # BLD AUTO: 0.2 K/UL (ref 0–0.5)
EOSINOPHIL NFR BLD: 2.5 % (ref 0–8)
ERYTHROCYTE [DISTWIDTH] IN BLOOD BY AUTOMATED COUNT: 13.5 % (ref 11.5–14.5)
EST. GFR  (AFRICAN AMERICAN): 30 ML/MIN/1.73 M^2
EST. GFR  (NON AFRICAN AMERICAN): 26 ML/MIN/1.73 M^2
GLUCOSE SERPL-MCNC: 121 MG/DL (ref 70–110)
HCT VFR BLD AUTO: 37.9 % (ref 40–54)
HGB BLD-MCNC: 12.6 G/DL (ref 14–18)
IMM GRANULOCYTES # BLD AUTO: 0.05 K/UL (ref 0–0.04)
IMM GRANULOCYTES NFR BLD AUTO: 0.8 % (ref 0–0.5)
LYMPHOCYTES # BLD AUTO: 0.9 K/UL (ref 1–4.8)
LYMPHOCYTES NFR BLD: 14.7 % (ref 18–48)
MCH RBC QN AUTO: 26.8 PG (ref 27–31)
MCHC RBC AUTO-ENTMCNC: 33.2 G/DL (ref 32–36)
MCV RBC AUTO: 81 FL (ref 82–98)
MONOCYTES # BLD AUTO: 1 K/UL (ref 0.3–1)
MONOCYTES NFR BLD: 15.2 % (ref 4–15)
NEUTROPHILS # BLD AUTO: 4.2 K/UL (ref 1.8–7.7)
NEUTROPHILS NFR BLD: 65.1 % (ref 38–73)
NRBC BLD-RTO: 0 /100 WBC
PLATELET # BLD AUTO: 156 K/UL (ref 150–450)
PMV BLD AUTO: 9 FL (ref 9.2–12.9)
POCT GLUCOSE: 128 MG/DL (ref 70–110)
POCT GLUCOSE: 152 MG/DL (ref 70–110)
POCT GLUCOSE: 158 MG/DL (ref 70–110)
POCT GLUCOSE: 205 MG/DL (ref 70–110)
POCT GLUCOSE: 215 MG/DL (ref 70–110)
POTASSIUM SERPL-SCNC: 3 MMOL/L (ref 3.5–5.1)
PROT SERPL-MCNC: 6.7 G/DL (ref 6–8.4)
RBC # BLD AUTO: 4.71 M/UL (ref 4.6–6.2)
SODIUM SERPL-SCNC: 133 MMOL/L (ref 136–145)
VANCOMYCIN TROUGH SERPL-MCNC: 15.8 UG/ML (ref 10–22)
WBC # BLD AUTO: 6.39 K/UL (ref 3.9–12.7)

## 2021-09-08 PROCEDURE — 99223 1ST HOSP IP/OBS HIGH 75: CPT | Mod: ,,, | Performed by: INTERNAL MEDICINE

## 2021-09-08 PROCEDURE — 99223 PR INITIAL HOSPITAL CARE,LEVL III: ICD-10-PCS | Mod: ,,, | Performed by: INTERNAL MEDICINE

## 2021-09-08 PROCEDURE — 25000003 PHARM REV CODE 250: Performed by: INTERNAL MEDICINE

## 2021-09-08 PROCEDURE — 80053 COMPREHEN METABOLIC PANEL: CPT | Performed by: NURSE PRACTITIONER

## 2021-09-08 PROCEDURE — 63600175 PHARM REV CODE 636 W HCPCS: Performed by: INTERNAL MEDICINE

## 2021-09-08 PROCEDURE — 36415 COLL VENOUS BLD VENIPUNCTURE: CPT | Performed by: NURSE PRACTITIONER

## 2021-09-08 PROCEDURE — 85025 COMPLETE CBC W/AUTO DIFF WBC: CPT | Performed by: NURSE PRACTITIONER

## 2021-09-08 PROCEDURE — 25000003 PHARM REV CODE 250: Performed by: PHYSICIAN ASSISTANT

## 2021-09-08 PROCEDURE — 36415 COLL VENOUS BLD VENIPUNCTURE: CPT | Performed by: HOSPITALIST

## 2021-09-08 PROCEDURE — 80202 ASSAY OF VANCOMYCIN: CPT | Performed by: HOSPITALIST

## 2021-09-08 PROCEDURE — 63600175 PHARM REV CODE 636 W HCPCS: Performed by: PHYSICIAN ASSISTANT

## 2021-09-08 PROCEDURE — 11000001 HC ACUTE MED/SURG PRIVATE ROOM

## 2021-09-08 RX ORDER — FUROSEMIDE 10 MG/ML
40 INJECTION INTRAMUSCULAR; INTRAVENOUS ONCE
Status: COMPLETED | OUTPATIENT
Start: 2021-09-08 | End: 2021-09-08

## 2021-09-08 RX ADMIN — TAMSULOSIN HYDROCHLORIDE 0.4 MG: 0.4 CAPSULE ORAL at 08:09

## 2021-09-08 RX ADMIN — AMLODIPINE BESYLATE 10 MG: 5 TABLET ORAL at 08:09

## 2021-09-08 RX ADMIN — BENZONATATE 100 MG: 100 CAPSULE ORAL at 03:09

## 2021-09-08 RX ADMIN — BENZONATATE 100 MG: 100 CAPSULE ORAL at 08:09

## 2021-09-08 RX ADMIN — VANCOMYCIN HYDROCHLORIDE 1750 MG: 10 INJECTION, POWDER, LYOPHILIZED, FOR SOLUTION INTRAVENOUS at 11:09

## 2021-09-08 RX ADMIN — APIXABAN 5 MG: 5 TABLET, FILM COATED ORAL at 08:09

## 2021-09-08 RX ADMIN — FUROSEMIDE 40 MG: 10 INJECTION, SOLUTION INTRAMUSCULAR; INTRAVENOUS at 03:09

## 2021-09-08 RX ADMIN — METOPROLOL SUCCINATE 50 MG: 50 TABLET, EXTENDED RELEASE ORAL at 08:09

## 2021-09-08 RX ADMIN — ATORVASTATIN CALCIUM 40 MG: 40 TABLET, FILM COATED ORAL at 08:09

## 2021-09-08 RX ADMIN — CEFTRIAXONE 2 G: 2 INJECTION, POWDER, FOR SOLUTION INTRAMUSCULAR; INTRAVENOUS at 05:09

## 2021-09-08 RX ADMIN — INSULIN ASPART 2 UNITS: 100 INJECTION, SOLUTION INTRAVENOUS; SUBCUTANEOUS at 06:09

## 2021-09-09 VITALS
HEART RATE: 106 BPM | TEMPERATURE: 98 F | OXYGEN SATURATION: 97 % | DIASTOLIC BLOOD PRESSURE: 69 MMHG | BODY MASS INDEX: 32.48 KG/M2 | RESPIRATION RATE: 18 BRPM | SYSTOLIC BLOOD PRESSURE: 129 MMHG | WEIGHT: 232 LBS | HEIGHT: 71 IN

## 2021-09-09 PROBLEM — R78.81 BACTEREMIA DUE TO GRAM-POSITIVE BACTERIA: Status: RESOLVED | Noted: 2021-09-07 | Resolved: 2021-09-09

## 2021-09-09 PROBLEM — I50.33 ACUTE ON CHRONIC DIASTOLIC CONGESTIVE HEART FAILURE: Status: RESOLVED | Noted: 2021-09-05 | Resolved: 2021-09-09

## 2021-09-09 LAB
POCT GLUCOSE: 155 MG/DL (ref 70–110)
POCT GLUCOSE: 175 MG/DL (ref 70–110)

## 2021-09-09 PROCEDURE — 99233 PR SUBSEQUENT HOSPITAL CARE,LEVL III: ICD-10-PCS | Mod: ,,, | Performed by: INTERNAL MEDICINE

## 2021-09-09 PROCEDURE — 25000003 PHARM REV CODE 250: Performed by: INTERNAL MEDICINE

## 2021-09-09 PROCEDURE — 63600175 PHARM REV CODE 636 W HCPCS: Performed by: PHYSICIAN ASSISTANT

## 2021-09-09 PROCEDURE — 99233 SBSQ HOSP IP/OBS HIGH 50: CPT | Mod: ,,, | Performed by: INTERNAL MEDICINE

## 2021-09-09 PROCEDURE — 25000003 PHARM REV CODE 250: Performed by: PHYSICIAN ASSISTANT

## 2021-09-09 PROCEDURE — 63600175 PHARM REV CODE 636 W HCPCS: Performed by: INTERNAL MEDICINE

## 2021-09-09 RX ORDER — FUROSEMIDE 40 MG/1
40 TABLET ORAL 2 TIMES DAILY
Qty: 60 TABLET | Refills: 5 | Status: ON HOLD | OUTPATIENT
Start: 2021-09-09 | End: 2021-11-27

## 2021-09-09 RX ORDER — CIPROFLOXACIN 500 MG/1
500 TABLET ORAL EVERY 12 HOURS
Qty: 14 TABLET | Refills: 0 | Status: SHIPPED | OUTPATIENT
Start: 2021-09-09 | End: 2021-09-16

## 2021-09-09 RX ADMIN — CEFTRIAXONE 2 G: 2 INJECTION, POWDER, FOR SOLUTION INTRAMUSCULAR; INTRAVENOUS at 06:09

## 2021-09-09 RX ADMIN — METOPROLOL SUCCINATE 50 MG: 50 TABLET, EXTENDED RELEASE ORAL at 09:09

## 2021-09-09 RX ADMIN — APIXABAN 5 MG: 5 TABLET, FILM COATED ORAL at 09:09

## 2021-09-09 RX ADMIN — AMLODIPINE BESYLATE 10 MG: 5 TABLET ORAL at 09:09

## 2021-09-09 RX ADMIN — TAMSULOSIN HYDROCHLORIDE 0.4 MG: 0.4 CAPSULE ORAL at 09:09

## 2021-09-09 RX ADMIN — ATORVASTATIN CALCIUM 40 MG: 40 TABLET, FILM COATED ORAL at 09:09

## 2021-09-10 ENCOUNTER — PATIENT OUTREACH (OUTPATIENT)
Dept: ADMINISTRATIVE | Facility: CLINIC | Age: 71
End: 2021-09-10

## 2021-09-10 LAB
BACTERIA BLD CULT: ABNORMAL

## 2021-09-11 LAB
BACTERIA BLD CULT: NORMAL
BACTERIA BLD CULT: NORMAL

## 2021-11-23 ENCOUNTER — HOSPITAL ENCOUNTER (INPATIENT)
Facility: HOSPITAL | Age: 71
LOS: 3 days | Discharge: HOME HEALTH | DRG: 291 | End: 2021-11-27
Attending: EMERGENCY MEDICINE | Admitting: EMERGENCY MEDICINE
Payer: MEDICARE

## 2021-11-23 DIAGNOSIS — I50.9 CHF (CONGESTIVE HEART FAILURE): ICD-10-CM

## 2021-11-23 DIAGNOSIS — J45.901 EXACERBATION OF ASTHMA, UNSPECIFIED ASTHMA SEVERITY, UNSPECIFIED WHETHER PERSISTENT: ICD-10-CM

## 2021-11-23 DIAGNOSIS — I48.91 ATRIAL FIBRILLATION, UNSPECIFIED TYPE: ICD-10-CM

## 2021-11-23 DIAGNOSIS — R06.02 SHORTNESS OF BREATH: ICD-10-CM

## 2021-11-23 DIAGNOSIS — N18.9 CHRONIC KIDNEY DISEASE, UNSPECIFIED CKD STAGE: ICD-10-CM

## 2021-11-23 DIAGNOSIS — J81.0 ACUTE PULMONARY EDEMA: ICD-10-CM

## 2021-11-23 DIAGNOSIS — I50.9 ACUTE ON CHRONIC CONGESTIVE HEART FAILURE, UNSPECIFIED HEART FAILURE TYPE: Primary | ICD-10-CM

## 2021-11-23 LAB
ALBUMIN SERPL BCP-MCNC: 3.5 G/DL (ref 3.5–5.2)
ALP SERPL-CCNC: 97 U/L (ref 55–135)
ALT SERPL W/O P-5'-P-CCNC: 9 U/L (ref 10–44)
ANION GAP SERPL CALC-SCNC: 11 MMOL/L (ref 8–16)
AST SERPL-CCNC: 19 U/L (ref 10–40)
BASOPHILS # BLD AUTO: 0.09 K/UL (ref 0–0.2)
BASOPHILS NFR BLD: 1 % (ref 0–1.9)
BILIRUB SERPL-MCNC: 0.8 MG/DL (ref 0.1–1)
BNP SERPL-MCNC: 929 PG/ML (ref 0–99)
BUN SERPL-MCNC: 30 MG/DL (ref 8–23)
CALCIUM SERPL-MCNC: 9.5 MG/DL (ref 8.7–10.5)
CHLORIDE SERPL-SCNC: 98 MMOL/L (ref 95–110)
CO2 SERPL-SCNC: 29 MMOL/L (ref 23–29)
CREAT SERPL-MCNC: 2.3 MG/DL (ref 0.5–1.4)
CTP QC/QA: YES
DIFFERENTIAL METHOD: ABNORMAL
EOSINOPHIL # BLD AUTO: 0.1 K/UL (ref 0–0.5)
EOSINOPHIL NFR BLD: 1.1 % (ref 0–8)
ERYTHROCYTE [DISTWIDTH] IN BLOOD BY AUTOMATED COUNT: 15.2 % (ref 11.5–14.5)
EST. GFR  (AFRICAN AMERICAN): 32 ML/MIN/1.73 M^2
EST. GFR  (NON AFRICAN AMERICAN): 28 ML/MIN/1.73 M^2
GLUCOSE SERPL-MCNC: 107 MG/DL (ref 70–110)
HCT VFR BLD AUTO: 34.4 % (ref 40–54)
HGB BLD-MCNC: 11.5 G/DL (ref 14–18)
IMM GRANULOCYTES # BLD AUTO: 0.03 K/UL (ref 0–0.04)
IMM GRANULOCYTES NFR BLD AUTO: 0.3 % (ref 0–0.5)
LYMPHOCYTES # BLD AUTO: 1 K/UL (ref 1–4.8)
LYMPHOCYTES NFR BLD: 10.7 % (ref 18–48)
MCH RBC QN AUTO: 28 PG (ref 27–31)
MCHC RBC AUTO-ENTMCNC: 33.4 G/DL (ref 32–36)
MCV RBC AUTO: 84 FL (ref 82–98)
MONOCYTES # BLD AUTO: 1.2 K/UL (ref 0.3–1)
MONOCYTES NFR BLD: 13.3 % (ref 4–15)
NEUTROPHILS # BLD AUTO: 6.5 K/UL (ref 1.8–7.7)
NEUTROPHILS NFR BLD: 73.6 % (ref 38–73)
NRBC BLD-RTO: 0 /100 WBC
PLATELET # BLD AUTO: 253 K/UL (ref 150–450)
PMV BLD AUTO: 8.5 FL (ref 9.2–12.9)
POTASSIUM SERPL-SCNC: 3.3 MMOL/L (ref 3.5–5.1)
PROT SERPL-MCNC: 7.6 G/DL (ref 6–8.4)
RBC # BLD AUTO: 4.1 M/UL (ref 4.6–6.2)
SARS-COV-2 RDRP RESP QL NAA+PROBE: NEGATIVE
SODIUM SERPL-SCNC: 138 MMOL/L (ref 136–145)
TROPONIN I SERPL DL<=0.01 NG/ML-MCNC: 0.01 NG/ML (ref 0–0.03)
WBC # BLD AUTO: 8.85 K/UL (ref 3.9–12.7)

## 2021-11-23 PROCEDURE — 99900035 HC TECH TIME PER 15 MIN (STAT)

## 2021-11-23 PROCEDURE — 83880 ASSAY OF NATRIURETIC PEPTIDE: CPT | Performed by: EMERGENCY MEDICINE

## 2021-11-23 PROCEDURE — 99291 CRITICAL CARE FIRST HOUR: CPT | Mod: 25

## 2021-11-23 PROCEDURE — 80053 COMPREHEN METABOLIC PANEL: CPT | Performed by: EMERGENCY MEDICINE

## 2021-11-23 PROCEDURE — 94660 CPAP INITIATION&MGMT: CPT

## 2021-11-23 PROCEDURE — 82803 BLOOD GASES ANY COMBINATION: CPT

## 2021-11-23 PROCEDURE — 63600175 PHARM REV CODE 636 W HCPCS: Performed by: EMERGENCY MEDICINE

## 2021-11-23 PROCEDURE — 84484 ASSAY OF TROPONIN QUANT: CPT | Performed by: EMERGENCY MEDICINE

## 2021-11-23 PROCEDURE — 94640 AIRWAY INHALATION TREATMENT: CPT

## 2021-11-23 PROCEDURE — U0002 COVID-19 LAB TEST NON-CDC: HCPCS | Performed by: EMERGENCY MEDICINE

## 2021-11-23 PROCEDURE — 93005 ELECTROCARDIOGRAM TRACING: CPT

## 2021-11-23 PROCEDURE — 85025 COMPLETE CBC W/AUTO DIFF WBC: CPT | Performed by: EMERGENCY MEDICINE

## 2021-11-23 PROCEDURE — 93010 ELECTROCARDIOGRAM REPORT: CPT | Mod: ,,, | Performed by: INTERNAL MEDICINE

## 2021-11-23 PROCEDURE — 25000242 PHARM REV CODE 250 ALT 637 W/ HCPCS: Performed by: EMERGENCY MEDICINE

## 2021-11-23 PROCEDURE — 96375 TX/PRO/DX INJ NEW DRUG ADDON: CPT

## 2021-11-23 PROCEDURE — 27000190 HC CPAP FULL FACE MASK W/VALVE

## 2021-11-23 PROCEDURE — 93010 EKG 12-LEAD: ICD-10-PCS | Mod: ,,, | Performed by: INTERNAL MEDICINE

## 2021-11-23 PROCEDURE — 96374 THER/PROPH/DIAG INJ IV PUSH: CPT

## 2021-11-23 RX ORDER — ASPIRIN 325 MG
325 TABLET ORAL
Status: DISCONTINUED | OUTPATIENT
Start: 2021-11-23 | End: 2021-11-24

## 2021-11-23 RX ORDER — FUROSEMIDE 10 MG/ML
40 INJECTION INTRAMUSCULAR; INTRAVENOUS
Status: COMPLETED | OUTPATIENT
Start: 2021-11-23 | End: 2021-11-23

## 2021-11-23 RX ORDER — METHYLPREDNISOLONE SOD SUCC 125 MG
125 VIAL (EA) INJECTION
Status: COMPLETED | OUTPATIENT
Start: 2021-11-23 | End: 2021-11-23

## 2021-11-23 RX ORDER — IPRATROPIUM BROMIDE AND ALBUTEROL SULFATE 2.5; .5 MG/3ML; MG/3ML
3 SOLUTION RESPIRATORY (INHALATION) EVERY 5 MIN PRN
Status: COMPLETED | OUTPATIENT
Start: 2021-11-23 | End: 2021-11-23

## 2021-11-23 RX ADMIN — METHYLPREDNISOLONE SODIUM SUCCINATE 125 MG: 125 INJECTION, POWDER, FOR SOLUTION INTRAMUSCULAR; INTRAVENOUS at 10:11

## 2021-11-23 RX ADMIN — IPRATROPIUM BROMIDE AND ALBUTEROL SULFATE 3 ML: 2.5; .5 SOLUTION RESPIRATORY (INHALATION) at 10:11

## 2021-11-23 RX ADMIN — FUROSEMIDE 40 MG: 10 INJECTION, SOLUTION INTRAMUSCULAR; INTRAVENOUS at 11:11

## 2021-11-24 PROBLEM — J45.901 ASTHMA ATTACK: Status: ACTIVE | Noted: 2021-11-24

## 2021-11-24 PROBLEM — I50.21 ACUTE HFREF (HEART FAILURE WITH REDUCED EJECTION FRACTION): Status: ACTIVE | Noted: 2021-11-24

## 2021-11-24 LAB
ANION GAP SERPL CALC-SCNC: 9 MMOL/L (ref 8–16)
AORTIC ROOT ANNULUS: 3.58 CM
AORTIC VALVE CUSP SEPERATION: 1.29 CM
ASCENDING AORTA: 2.99 CM
AV INDEX (PROSTH): 0.35
AV MEAN GRADIENT: 17 MMHG
AV PEAK GRADIENT: 26 MMHG
AV VALVE AREA: 1.4 CM2
AV VELOCITY RATIO: 0.33
BACTERIA #/AREA URNS HPF: ABNORMAL /HPF
BASOPHILS # BLD AUTO: 0.04 K/UL (ref 0–0.2)
BASOPHILS NFR BLD: 0.4 % (ref 0–1.9)
BILIRUB UR QL STRIP: NEGATIVE
BSA FOR ECHO PROCEDURE: 2.25 M2
BUN SERPL-MCNC: 31 MG/DL (ref 8–23)
CALCIUM SERPL-MCNC: 10.2 MG/DL (ref 8.7–10.5)
CHLORIDE SERPL-SCNC: 98 MMOL/L (ref 95–110)
CLARITY UR: CLEAR
CO2 SERPL-SCNC: 30 MMOL/L (ref 23–29)
COLOR UR: YELLOW
CREAT SERPL-MCNC: 2.3 MG/DL (ref 0.5–1.4)
CV ECHO LV RWT: 0.7 CM
DIFFERENTIAL METHOD: ABNORMAL
DOP CALC AO PEAK VEL: 2.53 M/S
DOP CALC AO VTI: 52.83 CM
DOP CALC LVOT AREA: 4 CM2
DOP CALC LVOT DIAMETER: 2.26 CM
DOP CALC LVOT PEAK VEL: 0.83 M/S
DOP CALC LVOT STROKE VOLUME: 74.01 CM3
DOP CALCLVOT PEAK VEL VTI: 18.46 CM
E/E' RATIO: 24.17 M/S
ECHO LV POSTERIOR WALL: 1.6 CM (ref 0.6–1.1)
EJECTION FRACTION: 55 %
EOSINOPHIL # BLD AUTO: 0 K/UL (ref 0–0.5)
EOSINOPHIL NFR BLD: 0.1 % (ref 0–8)
ERYTHROCYTE [DISTWIDTH] IN BLOOD BY AUTOMATED COUNT: 14.9 % (ref 11.5–14.5)
EST. GFR  (AFRICAN AMERICAN): 32 ML/MIN/1.73 M^2
EST. GFR  (NON AFRICAN AMERICAN): 28 ML/MIN/1.73 M^2
FRACTIONAL SHORTENING: 24 % (ref 28–44)
GLUCOSE SERPL-MCNC: 139 MG/DL (ref 70–110)
GLUCOSE UR QL STRIP: NEGATIVE
HCT VFR BLD AUTO: 33 % (ref 40–54)
HGB BLD-MCNC: 11.1 G/DL (ref 14–18)
HGB UR QL STRIP: ABNORMAL
HYALINE CASTS #/AREA URNS LPF: 0 /LPF
IMM GRANULOCYTES # BLD AUTO: 0.02 K/UL (ref 0–0.04)
IMM GRANULOCYTES NFR BLD AUTO: 0.2 % (ref 0–0.5)
INTERVENTRICULAR SEPTUM: 1.65 CM (ref 0.6–1.1)
IVRT: 117.65 MSEC
KETONES UR QL STRIP: NEGATIVE
LA MAJOR: 5.62 CM
LA MINOR: 6.19 CM
LA WIDTH: 4.51 CM
LEFT ATRIUM SIZE: 5.54 CM
LEFT ATRIUM VOLUME INDEX: 56.6 ML/M2
LEFT ATRIUM VOLUME: 125.12 CM3
LEFT INTERNAL DIMENSION IN SYSTOLE: 3.46 CM (ref 2.1–4)
LEFT VENTRICLE DIASTOLIC VOLUME INDEX: 43.09 ML/M2
LEFT VENTRICLE DIASTOLIC VOLUME: 95.22 ML
LEFT VENTRICLE MASS INDEX: 144 G/M2
LEFT VENTRICLE SYSTOLIC VOLUME INDEX: 22.4 ML/M2
LEFT VENTRICLE SYSTOLIC VOLUME: 49.49 ML
LEFT VENTRICULAR INTERNAL DIMENSION IN DIASTOLE: 4.56 CM (ref 3.5–6)
LEFT VENTRICULAR MASS: 318.06 G
LEUKOCYTE ESTERASE UR QL STRIP: NEGATIVE
LV LATERAL E/E' RATIO: 16.11 M/S
LV SEPTAL E/E' RATIO: 48.33 M/S
LYMPHOCYTES # BLD AUTO: 0.4 K/UL (ref 1–4.8)
LYMPHOCYTES NFR BLD: 3.9 % (ref 18–48)
MAGNESIUM SERPL-MCNC: 1.9 MG/DL (ref 1.6–2.6)
MCH RBC QN AUTO: 28 PG (ref 27–31)
MCHC RBC AUTO-ENTMCNC: 33.6 G/DL (ref 32–36)
MCV RBC AUTO: 83 FL (ref 82–98)
MICROSCOPIC COMMENT: ABNORMAL
MONOCYTES # BLD AUTO: 0.2 K/UL (ref 0.3–1)
MONOCYTES NFR BLD: 1.9 % (ref 4–15)
MV PEAK E VEL: 1.45 M/S
NEUTROPHILS # BLD AUTO: 8.6 K/UL (ref 1.8–7.7)
NEUTROPHILS NFR BLD: 93.5 % (ref 38–73)
NITRITE UR QL STRIP: NEGATIVE
NRBC BLD-RTO: 0 /100 WBC
PH UR STRIP: 6 [PH] (ref 5–8)
PHOSPHATE SERPL-MCNC: 3 MG/DL (ref 2.7–4.5)
PISA TR MAX VEL: 3.61 M/S
PLATELET # BLD AUTO: 270 K/UL (ref 150–450)
PMV BLD AUTO: 8.5 FL (ref 9.2–12.9)
POCT GLUCOSE: 189 MG/DL (ref 70–110)
POCT GLUCOSE: 240 MG/DL (ref 70–110)
POCT GLUCOSE: 250 MG/DL (ref 70–110)
POCT GLUCOSE: 306 MG/DL (ref 70–110)
POTASSIUM SERPL-SCNC: 3.2 MMOL/L (ref 3.5–5.1)
PROT UR QL STRIP: ABNORMAL
PV PEAK VELOCITY: 1.14 CM/S
RA MAJOR: 5.39 CM
RA PRESSURE: 8 MMHG
RA WIDTH: 4.31 CM
RBC # BLD AUTO: 3.96 M/UL (ref 4.6–6.2)
RBC #/AREA URNS HPF: 20 /HPF (ref 0–4)
RIGHT VENTRICULAR END-DIASTOLIC DIMENSION: 5 CM
RV TISSUE DOPPLER FREE WALL SYSTOLIC VELOCITY 1 (APICAL 4 CHAMBER VIEW): 7.78 CM/S
SINUS: 3.63 CM
SODIUM SERPL-SCNC: 137 MMOL/L (ref 136–145)
SP GR UR STRIP: 1.01 (ref 1–1.03)
STJ: 2.92 CM
TDI LATERAL: 0.09 M/S
TDI SEPTAL: 0.03 M/S
TDI: 0.06 M/S
TR MAX PG: 52 MMHG
TRICUSPID ANNULAR PLANE SYSTOLIC EXCURSION: 2.18 CM
TROPONIN I SERPL DL<=0.01 NG/ML-MCNC: 0.01 NG/ML (ref 0–0.03)
TV REST PULMONARY ARTERY PRESSURE: 60 MMHG
URN SPEC COLLECT METH UR: ABNORMAL
UROBILINOGEN UR STRIP-ACNC: NEGATIVE EU/DL
WBC # BLD AUTO: 9.24 K/UL (ref 3.9–12.7)
WBC #/AREA URNS HPF: 0 /HPF (ref 0–5)

## 2021-11-24 PROCEDURE — 84100 ASSAY OF PHOSPHORUS: CPT | Performed by: INTERNAL MEDICINE

## 2021-11-24 PROCEDURE — 99900035 HC TECH TIME PER 15 MIN (STAT)

## 2021-11-24 PROCEDURE — 84484 ASSAY OF TROPONIN QUANT: CPT | Performed by: INTERNAL MEDICINE

## 2021-11-24 PROCEDURE — 25000003 PHARM REV CODE 250: Performed by: INTERNAL MEDICINE

## 2021-11-24 PROCEDURE — 63600175 PHARM REV CODE 636 W HCPCS: Performed by: INTERNAL MEDICINE

## 2021-11-24 PROCEDURE — 94760 N-INVAS EAR/PLS OXIMETRY 1: CPT

## 2021-11-24 PROCEDURE — 21400001 HC TELEMETRY ROOM

## 2021-11-24 PROCEDURE — 81000 URINALYSIS NONAUTO W/SCOPE: CPT | Performed by: EMERGENCY MEDICINE

## 2021-11-24 PROCEDURE — 27000221 HC OXYGEN, UP TO 24 HOURS

## 2021-11-24 PROCEDURE — 63600175 PHARM REV CODE 636 W HCPCS: Performed by: CLINIC/CENTER

## 2021-11-24 PROCEDURE — 85025 COMPLETE CBC W/AUTO DIFF WBC: CPT | Performed by: INTERNAL MEDICINE

## 2021-11-24 PROCEDURE — 25000003 PHARM REV CODE 250: Performed by: EMERGENCY MEDICINE

## 2021-11-24 PROCEDURE — 83735 ASSAY OF MAGNESIUM: CPT | Performed by: INTERNAL MEDICINE

## 2021-11-24 PROCEDURE — 80048 BASIC METABOLIC PNL TOTAL CA: CPT | Performed by: INTERNAL MEDICINE

## 2021-11-24 RX ORDER — ONDANSETRON 2 MG/ML
4 INJECTION INTRAMUSCULAR; INTRAVENOUS EVERY 8 HOURS PRN
Status: DISCONTINUED | OUTPATIENT
Start: 2021-11-24 | End: 2021-11-27 | Stop reason: HOSPADM

## 2021-11-24 RX ORDER — AMLODIPINE BESYLATE 5 MG/1
10 TABLET ORAL DAILY
Status: DISCONTINUED | OUTPATIENT
Start: 2021-11-24 | End: 2021-11-27 | Stop reason: HOSPADM

## 2021-11-24 RX ORDER — METOPROLOL SUCCINATE 50 MG/1
50 TABLET, EXTENDED RELEASE ORAL DAILY
Status: DISCONTINUED | OUTPATIENT
Start: 2021-11-24 | End: 2021-11-27 | Stop reason: HOSPADM

## 2021-11-24 RX ORDER — GLUCAGON 1 MG
1 KIT INJECTION
Status: DISCONTINUED | OUTPATIENT
Start: 2021-11-24 | End: 2021-11-27 | Stop reason: HOSPADM

## 2021-11-24 RX ORDER — TALC
6 POWDER (GRAM) TOPICAL NIGHTLY PRN
Status: DISCONTINUED | OUTPATIENT
Start: 2021-11-24 | End: 2021-11-27 | Stop reason: HOSPADM

## 2021-11-24 RX ORDER — MUPIROCIN 20 MG/G
OINTMENT TOPICAL 2 TIMES DAILY
Status: DISCONTINUED | OUTPATIENT
Start: 2021-11-24 | End: 2021-11-27 | Stop reason: HOSPADM

## 2021-11-24 RX ORDER — HYDRALAZINE HYDROCHLORIDE 20 MG/ML
10 INJECTION INTRAMUSCULAR; INTRAVENOUS ONCE
Status: COMPLETED | OUTPATIENT
Start: 2021-11-24 | End: 2021-11-24

## 2021-11-24 RX ORDER — PREDNISONE 20 MG/1
40 TABLET ORAL DAILY
Status: DISCONTINUED | OUTPATIENT
Start: 2021-11-24 | End: 2021-11-24

## 2021-11-24 RX ORDER — IPRATROPIUM BROMIDE AND ALBUTEROL SULFATE 2.5; .5 MG/3ML; MG/3ML
3 SOLUTION RESPIRATORY (INHALATION) EVERY 4 HOURS PRN
Status: DISCONTINUED | OUTPATIENT
Start: 2021-11-24 | End: 2021-11-27 | Stop reason: HOSPADM

## 2021-11-24 RX ORDER — NAPROXEN SODIUM 220 MG/1
81 TABLET, FILM COATED ORAL DAILY
Status: DISCONTINUED | OUTPATIENT
Start: 2021-11-24 | End: 2021-11-27 | Stop reason: HOSPADM

## 2021-11-24 RX ORDER — ATORVASTATIN CALCIUM 40 MG/1
80 TABLET, FILM COATED ORAL DAILY
Status: DISCONTINUED | OUTPATIENT
Start: 2021-11-24 | End: 2021-11-27 | Stop reason: HOSPADM

## 2021-11-24 RX ORDER — SPIRONOLACTONE 25 MG/1
25 TABLET ORAL DAILY
Status: DISCONTINUED | OUTPATIENT
Start: 2021-11-24 | End: 2021-11-27 | Stop reason: HOSPADM

## 2021-11-24 RX ORDER — FAMOTIDINE 20 MG/1
40 TABLET, FILM COATED ORAL DAILY
Status: DISCONTINUED | OUTPATIENT
Start: 2021-11-24 | End: 2021-11-24

## 2021-11-24 RX ORDER — HYDRALAZINE HYDROCHLORIDE 20 MG/ML
10 INJECTION INTRAMUSCULAR; INTRAVENOUS EVERY 8 HOURS PRN
Status: DISCONTINUED | OUTPATIENT
Start: 2021-11-24 | End: 2021-11-27 | Stop reason: HOSPADM

## 2021-11-24 RX ORDER — SODIUM CHLORIDE 0.9 % (FLUSH) 0.9 %
10 SYRINGE (ML) INJECTION
Status: DISCONTINUED | OUTPATIENT
Start: 2021-11-24 | End: 2021-11-27 | Stop reason: HOSPADM

## 2021-11-24 RX ORDER — FAMOTIDINE 20 MG/1
20 TABLET, FILM COATED ORAL DAILY
Status: DISCONTINUED | OUTPATIENT
Start: 2021-11-25 | End: 2021-11-27 | Stop reason: HOSPADM

## 2021-11-24 RX ORDER — HYDROCHLOROTHIAZIDE 25 MG/1
25 TABLET ORAL DAILY
Status: DISCONTINUED | OUTPATIENT
Start: 2021-11-24 | End: 2021-11-27 | Stop reason: HOSPADM

## 2021-11-24 RX ORDER — ACETAMINOPHEN 325 MG/1
650 TABLET ORAL EVERY 4 HOURS PRN
Status: DISCONTINUED | OUTPATIENT
Start: 2021-11-24 | End: 2021-11-27 | Stop reason: HOSPADM

## 2021-11-24 RX ORDER — INSULIN ASPART 100 [IU]/ML
0-5 INJECTION, SOLUTION INTRAVENOUS; SUBCUTANEOUS EVERY 6 HOURS PRN
Status: DISCONTINUED | OUTPATIENT
Start: 2021-11-24 | End: 2021-11-27 | Stop reason: HOSPADM

## 2021-11-24 RX ORDER — LOSARTAN POTASSIUM 25 MG/1
100 TABLET ORAL DAILY
Status: DISCONTINUED | OUTPATIENT
Start: 2021-11-24 | End: 2021-11-27 | Stop reason: HOSPADM

## 2021-11-24 RX ORDER — AMIODARONE HYDROCHLORIDE 200 MG/1
200 TABLET ORAL DAILY
Status: DISCONTINUED | OUTPATIENT
Start: 2021-11-24 | End: 2021-11-27 | Stop reason: HOSPADM

## 2021-11-24 RX ORDER — POTASSIUM CHLORIDE 7.45 MG/ML
10 INJECTION INTRAVENOUS ONCE
Status: COMPLETED | OUTPATIENT
Start: 2021-11-24 | End: 2021-11-24

## 2021-11-24 RX ORDER — FUROSEMIDE 10 MG/ML
80 INJECTION INTRAMUSCULAR; INTRAVENOUS
Status: DISPENSED | OUTPATIENT
Start: 2021-11-24 | End: 2021-11-27

## 2021-11-24 RX ADMIN — ATORVASTATIN CALCIUM 80 MG: 40 TABLET, FILM COATED ORAL at 08:11

## 2021-11-24 RX ADMIN — ASPIRIN 81 MG CHEWABLE TABLET 81 MG: 81 TABLET CHEWABLE at 08:11

## 2021-11-24 RX ADMIN — APIXABAN 5 MG: 5 TABLET, FILM COATED ORAL at 08:11

## 2021-11-24 RX ADMIN — AMIODARONE HYDROCHLORIDE 200 MG: 200 TABLET ORAL at 08:11

## 2021-11-24 RX ADMIN — MUPIROCIN: 20 OINTMENT TOPICAL at 08:11

## 2021-11-24 RX ADMIN — FAMOTIDINE 40 MG: 20 TABLET ORAL at 08:11

## 2021-11-24 RX ADMIN — AMLODIPINE BESYLATE 10 MG: 5 TABLET ORAL at 08:11

## 2021-11-24 RX ADMIN — INSULIN ASPART 2 UNITS: 100 INJECTION, SOLUTION INTRAVENOUS; SUBCUTANEOUS at 04:11

## 2021-11-24 RX ADMIN — HYDRALAZINE HYDROCHLORIDE 10 MG: 20 INJECTION, SOLUTION INTRAMUSCULAR; INTRAVENOUS at 01:11

## 2021-11-24 RX ADMIN — INSULIN ASPART 1 UNITS: 100 INJECTION, SOLUTION INTRAVENOUS; SUBCUTANEOUS at 08:11

## 2021-11-24 RX ADMIN — NITROGLYCERIN 0.5 INCH: 20 OINTMENT TOPICAL at 12:11

## 2021-11-24 RX ADMIN — FUROSEMIDE 80 MG: 10 INJECTION, SOLUTION INTRAMUSCULAR; INTRAVENOUS at 06:11

## 2021-11-24 RX ADMIN — SPIRONOLACTONE 25 MG: 25 TABLET, FILM COATED ORAL at 08:11

## 2021-11-24 RX ADMIN — INSULIN ASPART 4 UNITS: 100 INJECTION, SOLUTION INTRAVENOUS; SUBCUTANEOUS at 11:11

## 2021-11-24 RX ADMIN — LOSARTAN POTASSIUM 100 MG: 25 TABLET, FILM COATED ORAL at 08:11

## 2021-11-24 RX ADMIN — HYDROCHLOROTHIAZIDE 25 MG: 25 TABLET ORAL at 08:11

## 2021-11-24 RX ADMIN — PREDNISONE 40 MG: 20 TABLET ORAL at 08:11

## 2021-11-24 RX ADMIN — METOPROLOL SUCCINATE 50 MG: 50 TABLET, EXTENDED RELEASE ORAL at 08:11

## 2021-11-24 RX ADMIN — POTASSIUM CHLORIDE 10 MEQ: 7.46 INJECTION, SOLUTION INTRAVENOUS at 06:11

## 2021-11-25 PROBLEM — J45.901 ASTHMA ATTACK: Status: RESOLVED | Noted: 2021-11-24 | Resolved: 2021-11-25

## 2021-11-25 PROBLEM — I50.21 ACUTE HFREF (HEART FAILURE WITH REDUCED EJECTION FRACTION): Status: RESOLVED | Noted: 2021-11-24 | Resolved: 2021-11-25

## 2021-11-25 LAB
ANION GAP SERPL CALC-SCNC: 13 MMOL/L (ref 8–16)
BUN SERPL-MCNC: 44 MG/DL (ref 8–23)
CALCIUM SERPL-MCNC: 9.7 MG/DL (ref 8.7–10.5)
CHLORIDE SERPL-SCNC: 99 MMOL/L (ref 95–110)
CO2 SERPL-SCNC: 25 MMOL/L (ref 23–29)
CREAT SERPL-MCNC: 2.3 MG/DL (ref 0.5–1.4)
EST. GFR  (AFRICAN AMERICAN): 32 ML/MIN/1.73 M^2
EST. GFR  (NON AFRICAN AMERICAN): 28 ML/MIN/1.73 M^2
GLUCOSE SERPL-MCNC: 209 MG/DL (ref 70–110)
POCT GLUCOSE: 169 MG/DL (ref 70–110)
POCT GLUCOSE: 234 MG/DL (ref 70–110)
POCT GLUCOSE: 251 MG/DL (ref 70–110)
POCT GLUCOSE: 266 MG/DL (ref 70–110)
POTASSIUM SERPL-SCNC: 3.8 MMOL/L (ref 3.5–5.1)
SODIUM SERPL-SCNC: 137 MMOL/L (ref 136–145)

## 2021-11-25 PROCEDURE — 63600175 PHARM REV CODE 636 W HCPCS: Performed by: INTERNAL MEDICINE

## 2021-11-25 PROCEDURE — 25000003 PHARM REV CODE 250: Performed by: STUDENT IN AN ORGANIZED HEALTH CARE EDUCATION/TRAINING PROGRAM

## 2021-11-25 PROCEDURE — 27000221 HC OXYGEN, UP TO 24 HOURS

## 2021-11-25 PROCEDURE — 21400001 HC TELEMETRY ROOM

## 2021-11-25 PROCEDURE — 94760 N-INVAS EAR/PLS OXIMETRY 1: CPT

## 2021-11-25 PROCEDURE — C9399 UNCLASSIFIED DRUGS OR BIOLOG: HCPCS | Performed by: STUDENT IN AN ORGANIZED HEALTH CARE EDUCATION/TRAINING PROGRAM

## 2021-11-25 PROCEDURE — 80048 BASIC METABOLIC PNL TOTAL CA: CPT | Performed by: INTERNAL MEDICINE

## 2021-11-25 PROCEDURE — 25000003 PHARM REV CODE 250: Performed by: INTERNAL MEDICINE

## 2021-11-25 PROCEDURE — 36415 COLL VENOUS BLD VENIPUNCTURE: CPT | Performed by: INTERNAL MEDICINE

## 2021-11-25 RX ADMIN — ATORVASTATIN CALCIUM 80 MG: 40 TABLET, FILM COATED ORAL at 09:11

## 2021-11-25 RX ADMIN — FUROSEMIDE 80 MG: 10 INJECTION, SOLUTION INTRAMUSCULAR; INTRAVENOUS at 05:11

## 2021-11-25 RX ADMIN — AMLODIPINE BESYLATE 10 MG: 5 TABLET ORAL at 09:11

## 2021-11-25 RX ADMIN — INSULIN ASPART 3 UNITS: 100 INJECTION, SOLUTION INTRAVENOUS; SUBCUTANEOUS at 05:11

## 2021-11-25 RX ADMIN — MUPIROCIN: 20 OINTMENT TOPICAL at 09:11

## 2021-11-25 RX ADMIN — ASPIRIN 81 MG CHEWABLE TABLET 81 MG: 81 TABLET CHEWABLE at 09:11

## 2021-11-25 RX ADMIN — INSULIN ASPART 2 UNITS: 100 INJECTION, SOLUTION INTRAVENOUS; SUBCUTANEOUS at 09:11

## 2021-11-25 RX ADMIN — MUPIROCIN: 20 OINTMENT TOPICAL at 08:11

## 2021-11-25 RX ADMIN — LOSARTAN POTASSIUM 100 MG: 25 TABLET, FILM COATED ORAL at 09:11

## 2021-11-25 RX ADMIN — AMIODARONE HYDROCHLORIDE 200 MG: 200 TABLET ORAL at 09:11

## 2021-11-25 RX ADMIN — APIXABAN 5 MG: 5 TABLET, FILM COATED ORAL at 08:11

## 2021-11-25 RX ADMIN — HYDROCHLOROTHIAZIDE 25 MG: 25 TABLET ORAL at 09:11

## 2021-11-25 RX ADMIN — METOPROLOL SUCCINATE 50 MG: 50 TABLET, EXTENDED RELEASE ORAL at 09:11

## 2021-11-25 RX ADMIN — FAMOTIDINE 20 MG: 20 TABLET ORAL at 09:11

## 2021-11-25 RX ADMIN — INSULIN DETEMIR 8 UNITS: 100 INJECTION, SOLUTION SUBCUTANEOUS at 11:11

## 2021-11-25 RX ADMIN — SPIRONOLACTONE 25 MG: 25 TABLET, FILM COATED ORAL at 09:11

## 2021-11-25 RX ADMIN — APIXABAN 5 MG: 5 TABLET, FILM COATED ORAL at 09:11

## 2021-11-26 LAB
ANION GAP SERPL CALC-SCNC: 11 MMOL/L (ref 8–16)
BUN SERPL-MCNC: 44 MG/DL (ref 8–23)
CALCIUM SERPL-MCNC: 10.3 MG/DL (ref 8.7–10.5)
CHLORIDE SERPL-SCNC: 99 MMOL/L (ref 95–110)
CO2 SERPL-SCNC: 29 MMOL/L (ref 23–29)
CREAT SERPL-MCNC: 2 MG/DL (ref 0.5–1.4)
EST. GFR  (AFRICAN AMERICAN): 38 ML/MIN/1.73 M^2
EST. GFR  (NON AFRICAN AMERICAN): 33 ML/MIN/1.73 M^2
GLUCOSE SERPL-MCNC: 128 MG/DL (ref 70–110)
POCT GLUCOSE: 120 MG/DL (ref 70–110)
POCT GLUCOSE: 188 MG/DL (ref 70–110)
POCT GLUCOSE: 212 MG/DL (ref 70–110)
POCT GLUCOSE: 260 MG/DL (ref 70–110)
POTASSIUM SERPL-SCNC: 3.1 MMOL/L (ref 3.5–5.1)
SODIUM SERPL-SCNC: 139 MMOL/L (ref 136–145)

## 2021-11-26 PROCEDURE — 63600175 PHARM REV CODE 636 W HCPCS: Performed by: STUDENT IN AN ORGANIZED HEALTH CARE EDUCATION/TRAINING PROGRAM

## 2021-11-26 PROCEDURE — 94760 N-INVAS EAR/PLS OXIMETRY 1: CPT

## 2021-11-26 PROCEDURE — 21400001 HC TELEMETRY ROOM

## 2021-11-26 PROCEDURE — 27000221 HC OXYGEN, UP TO 24 HOURS

## 2021-11-26 PROCEDURE — 25000003 PHARM REV CODE 250: Performed by: INTERNAL MEDICINE

## 2021-11-26 PROCEDURE — 25000003 PHARM REV CODE 250: Performed by: STUDENT IN AN ORGANIZED HEALTH CARE EDUCATION/TRAINING PROGRAM

## 2021-11-26 PROCEDURE — 80048 BASIC METABOLIC PNL TOTAL CA: CPT | Performed by: INTERNAL MEDICINE

## 2021-11-26 PROCEDURE — 63600175 PHARM REV CODE 636 W HCPCS: Performed by: INTERNAL MEDICINE

## 2021-11-26 PROCEDURE — 36415 COLL VENOUS BLD VENIPUNCTURE: CPT | Performed by: INTERNAL MEDICINE

## 2021-11-26 RX ORDER — POTASSIUM CHLORIDE 20 MEQ/1
40 TABLET, EXTENDED RELEASE ORAL ONCE
Status: COMPLETED | OUTPATIENT
Start: 2021-11-26 | End: 2021-11-26

## 2021-11-26 RX ORDER — POTASSIUM CHLORIDE 20 MEQ/1
40 TABLET, EXTENDED RELEASE ORAL 2 TIMES DAILY
Status: DISCONTINUED | OUTPATIENT
Start: 2021-11-26 | End: 2021-11-27 | Stop reason: HOSPADM

## 2021-11-26 RX ORDER — FUROSEMIDE 40 MG/1
40 TABLET ORAL 2 TIMES DAILY
Status: DISCONTINUED | OUTPATIENT
Start: 2021-11-27 | End: 2021-11-27 | Stop reason: HOSPADM

## 2021-11-26 RX ADMIN — AMIODARONE HYDROCHLORIDE 200 MG: 200 TABLET ORAL at 09:11

## 2021-11-26 RX ADMIN — LOSARTAN POTASSIUM 100 MG: 25 TABLET, FILM COATED ORAL at 09:11

## 2021-11-26 RX ADMIN — FUROSEMIDE 80 MG: 10 INJECTION, SOLUTION INTRAMUSCULAR; INTRAVENOUS at 05:11

## 2021-11-26 RX ADMIN — SPIRONOLACTONE 25 MG: 25 TABLET, FILM COATED ORAL at 09:11

## 2021-11-26 RX ADMIN — HYDROCHLOROTHIAZIDE 25 MG: 25 TABLET ORAL at 09:11

## 2021-11-26 RX ADMIN — MUPIROCIN: 20 OINTMENT TOPICAL at 09:11

## 2021-11-26 RX ADMIN — AMLODIPINE BESYLATE 10 MG: 5 TABLET ORAL at 09:11

## 2021-11-26 RX ADMIN — APIXABAN 5 MG: 5 TABLET, FILM COATED ORAL at 09:11

## 2021-11-26 RX ADMIN — METOPROLOL SUCCINATE 50 MG: 50 TABLET, EXTENDED RELEASE ORAL at 09:11

## 2021-11-26 RX ADMIN — ATORVASTATIN CALCIUM 80 MG: 40 TABLET, FILM COATED ORAL at 09:11

## 2021-11-26 RX ADMIN — FAMOTIDINE 20 MG: 20 TABLET ORAL at 09:11

## 2021-11-26 RX ADMIN — INSULIN ASPART 2 UNITS: 100 INJECTION, SOLUTION INTRAVENOUS; SUBCUTANEOUS at 05:11

## 2021-11-26 RX ADMIN — APIXABAN 5 MG: 5 TABLET, FILM COATED ORAL at 08:11

## 2021-11-26 RX ADMIN — POTASSIUM CHLORIDE 40 MEQ: 1500 TABLET, EXTENDED RELEASE ORAL at 09:11

## 2021-11-26 RX ADMIN — INSULIN ASPART 3 UNITS: 100 INJECTION, SOLUTION INTRAVENOUS; SUBCUTANEOUS at 01:11

## 2021-11-26 RX ADMIN — INSULIN DETEMIR 8 UNITS: 100 INJECTION, SOLUTION SUBCUTANEOUS at 09:11

## 2021-11-26 RX ADMIN — POTASSIUM CHLORIDE 40 MEQ: 1500 TABLET, EXTENDED RELEASE ORAL at 04:11

## 2021-11-26 RX ADMIN — ASPIRIN 81 MG CHEWABLE TABLET 81 MG: 81 TABLET CHEWABLE at 09:11

## 2021-11-27 VITALS
WEIGHT: 220.88 LBS | HEIGHT: 71 IN | OXYGEN SATURATION: 97 % | BODY MASS INDEX: 30.92 KG/M2 | HEART RATE: 84 BPM | RESPIRATION RATE: 19 BRPM | DIASTOLIC BLOOD PRESSURE: 80 MMHG | TEMPERATURE: 98 F | SYSTOLIC BLOOD PRESSURE: 141 MMHG

## 2021-11-27 LAB
ANION GAP SERPL CALC-SCNC: 14 MMOL/L (ref 8–16)
BUN SERPL-MCNC: 39 MG/DL (ref 8–23)
CALCIUM SERPL-MCNC: 10.5 MG/DL (ref 8.7–10.5)
CHLORIDE SERPL-SCNC: 96 MMOL/L (ref 95–110)
CO2 SERPL-SCNC: 30 MMOL/L (ref 23–29)
CREAT SERPL-MCNC: 1.9 MG/DL (ref 0.5–1.4)
EST. GFR  (AFRICAN AMERICAN): 40 ML/MIN/1.73 M^2
EST. GFR  (NON AFRICAN AMERICAN): 35 ML/MIN/1.73 M^2
GLUCOSE SERPL-MCNC: 154 MG/DL (ref 70–110)
POCT GLUCOSE: 195 MG/DL (ref 70–110)
POTASSIUM SERPL-SCNC: 3.8 MMOL/L (ref 3.5–5.1)
SODIUM SERPL-SCNC: 140 MMOL/L (ref 136–145)

## 2021-11-27 PROCEDURE — 25000003 PHARM REV CODE 250: Performed by: INTERNAL MEDICINE

## 2021-11-27 PROCEDURE — G0009 ADMIN PNEUMOCOCCAL VACCINE: HCPCS | Performed by: INTERNAL MEDICINE

## 2021-11-27 PROCEDURE — 36415 COLL VENOUS BLD VENIPUNCTURE: CPT | Performed by: INTERNAL MEDICINE

## 2021-11-27 PROCEDURE — 80048 BASIC METABOLIC PNL TOTAL CA: CPT | Performed by: INTERNAL MEDICINE

## 2021-11-27 PROCEDURE — 25000003 PHARM REV CODE 250: Performed by: STUDENT IN AN ORGANIZED HEALTH CARE EDUCATION/TRAINING PROGRAM

## 2021-11-27 PROCEDURE — 90694 VACC AIIV4 NO PRSRV 0.5ML IM: CPT | Performed by: INTERNAL MEDICINE

## 2021-11-27 PROCEDURE — 63600175 PHARM REV CODE 636 W HCPCS: Performed by: INTERNAL MEDICINE

## 2021-11-27 PROCEDURE — G0008 ADMIN INFLUENZA VIRUS VAC: HCPCS | Performed by: INTERNAL MEDICINE

## 2021-11-27 PROCEDURE — 90670 PCV13 VACCINE IM: CPT | Performed by: INTERNAL MEDICINE

## 2021-11-27 PROCEDURE — 90471 IMMUNIZATION ADMIN: CPT | Performed by: INTERNAL MEDICINE

## 2021-11-27 PROCEDURE — 90472 IMMUNIZATION ADMIN EACH ADD: CPT | Performed by: INTERNAL MEDICINE

## 2021-11-27 RX ORDER — FUROSEMIDE 40 MG/1
80 TABLET ORAL 2 TIMES DAILY
Qty: 60 TABLET | Refills: 5 | Status: ON HOLD | OUTPATIENT
Start: 2021-11-27 | End: 2021-12-15 | Stop reason: SDUPTHER

## 2021-11-27 RX ADMIN — METOPROLOL SUCCINATE 50 MG: 50 TABLET, EXTENDED RELEASE ORAL at 08:11

## 2021-11-27 RX ADMIN — POTASSIUM CHLORIDE 40 MEQ: 1500 TABLET, EXTENDED RELEASE ORAL at 08:11

## 2021-11-27 RX ADMIN — MUPIROCIN: 20 OINTMENT TOPICAL at 08:11

## 2021-11-27 RX ADMIN — INFLUENZA VACCINE, ADJUVANTED 0.5 ML: 15; 15; 15; 15 INJECTION, SUSPENSION INTRAMUSCULAR at 04:11

## 2021-11-27 RX ADMIN — HYDROCHLOROTHIAZIDE 25 MG: 25 TABLET ORAL at 08:11

## 2021-11-27 RX ADMIN — APIXABAN 5 MG: 5 TABLET, FILM COATED ORAL at 08:11

## 2021-11-27 RX ADMIN — PNEUMOCOCCAL 13-VALENT CONJUGATE VACCINE 0.5 ML: 2.2; 2.2; 2.2; 2.2; 2.2; 4.4; 2.2; 2.2; 2.2; 2.2; 2.2; 2.2; 2.2 INJECTION, SUSPENSION INTRAMUSCULAR at 04:11

## 2021-11-27 RX ADMIN — ASPIRIN 81 MG CHEWABLE TABLET 81 MG: 81 TABLET CHEWABLE at 08:11

## 2021-11-27 RX ADMIN — FUROSEMIDE 40 MG: 40 TABLET ORAL at 08:11

## 2021-11-27 RX ADMIN — AMIODARONE HYDROCHLORIDE 200 MG: 200 TABLET ORAL at 08:11

## 2021-11-27 RX ADMIN — FAMOTIDINE 20 MG: 20 TABLET ORAL at 08:11

## 2021-11-27 RX ADMIN — SPIRONOLACTONE 25 MG: 25 TABLET, FILM COATED ORAL at 08:11

## 2021-11-27 RX ADMIN — LOSARTAN POTASSIUM 100 MG: 25 TABLET, FILM COATED ORAL at 08:11

## 2021-11-27 RX ADMIN — INSULIN DETEMIR 8 UNITS: 100 INJECTION, SOLUTION SUBCUTANEOUS at 08:11

## 2021-11-27 RX ADMIN — AMLODIPINE BESYLATE 10 MG: 5 TABLET ORAL at 08:11

## 2021-11-27 RX ADMIN — ATORVASTATIN CALCIUM 80 MG: 40 TABLET, FILM COATED ORAL at 08:11

## 2021-11-29 ENCOUNTER — PATIENT OUTREACH (OUTPATIENT)
Dept: ADMINISTRATIVE | Facility: CLINIC | Age: 71
End: 2021-11-29
Payer: MEDICARE

## 2021-12-09 ENCOUNTER — HOSPITAL ENCOUNTER (INPATIENT)
Facility: HOSPITAL | Age: 71
LOS: 4 days | Discharge: HOME | DRG: 682 | End: 2021-12-15
Attending: EMERGENCY MEDICINE | Admitting: HOSPITALIST
Payer: MEDICARE

## 2021-12-09 DIAGNOSIS — R00.1 BRADYCARDIA: ICD-10-CM

## 2021-12-09 DIAGNOSIS — R07.9 CHEST PAIN: ICD-10-CM

## 2021-12-09 DIAGNOSIS — R06.02 SHORTNESS OF BREATH: ICD-10-CM

## 2021-12-09 DIAGNOSIS — J45.909 ASTHMA, UNSPECIFIED ASTHMA SEVERITY, UNSPECIFIED WHETHER COMPLICATED, UNSPECIFIED WHETHER PERSISTENT: Primary | ICD-10-CM

## 2021-12-09 LAB
ALBUMIN SERPL BCP-MCNC: 3.4 G/DL (ref 3.5–5.2)
ALLENS TEST: ABNORMAL
ALP SERPL-CCNC: 72 U/L (ref 55–135)
ALT SERPL W/O P-5'-P-CCNC: 16 U/L (ref 10–44)
ANION GAP SERPL CALC-SCNC: 14 MMOL/L (ref 8–16)
AST SERPL-CCNC: 15 U/L (ref 10–40)
BASOPHILS # BLD AUTO: 0.12 K/UL (ref 0–0.2)
BASOPHILS NFR BLD: 1.6 % (ref 0–1.9)
BILIRUB SERPL-MCNC: 0.5 MG/DL (ref 0.1–1)
BNP SERPL-MCNC: 525 PG/ML (ref 0–99)
BUN SERPL-MCNC: 58 MG/DL (ref 8–23)
CALCIUM SERPL-MCNC: 8.7 MG/DL (ref 8.7–10.5)
CHLORIDE SERPL-SCNC: 99 MMOL/L (ref 95–110)
CO2 SERPL-SCNC: 23 MMOL/L (ref 23–29)
CREAT SERPL-MCNC: 3.6 MG/DL (ref 0.5–1.4)
CTP QC/QA: YES
DELSYS: ABNORMAL
DIFFERENTIAL METHOD: ABNORMAL
EOSINOPHIL # BLD AUTO: 0.2 K/UL (ref 0–0.5)
EOSINOPHIL NFR BLD: 2 % (ref 0–8)
ERYTHROCYTE [DISTWIDTH] IN BLOOD BY AUTOMATED COUNT: 14.6 % (ref 11.5–14.5)
EST. GFR  (AFRICAN AMERICAN): 19 ML/MIN/1.73 M^2
EST. GFR  (NON AFRICAN AMERICAN): 16 ML/MIN/1.73 M^2
FIO2: 28
FLOW: 2
GLUCOSE SERPL-MCNC: 107 MG/DL (ref 70–110)
HCO3 UR-SCNC: 28.1 MMOL/L (ref 24–28)
HCT VFR BLD AUTO: 32.1 % (ref 40–54)
HGB BLD-MCNC: 10.5 G/DL (ref 14–18)
IMM GRANULOCYTES # BLD AUTO: 0.03 K/UL (ref 0–0.04)
IMM GRANULOCYTES NFR BLD AUTO: 0.4 % (ref 0–0.5)
LYMPHOCYTES # BLD AUTO: 0.9 K/UL (ref 1–4.8)
LYMPHOCYTES NFR BLD: 12.5 % (ref 18–48)
MCH RBC QN AUTO: 27.9 PG (ref 27–31)
MCHC RBC AUTO-ENTMCNC: 32.7 G/DL (ref 32–36)
MCV RBC AUTO: 85 FL (ref 82–98)
MODE: ABNORMAL
MONOCYTES # BLD AUTO: 0.9 K/UL (ref 0.3–1)
MONOCYTES NFR BLD: 12.5 % (ref 4–15)
NEUTROPHILS # BLD AUTO: 5.2 K/UL (ref 1.8–7.7)
NEUTROPHILS NFR BLD: 71 % (ref 38–73)
NRBC BLD-RTO: 0 /100 WBC
PCO2 BLDA: 46.5 MMHG (ref 35–45)
PH SMN: 7.39 [PH] (ref 7.35–7.45)
PLATELET # BLD AUTO: 334 K/UL (ref 150–450)
PMV BLD AUTO: 9.2 FL (ref 9.2–12.9)
PO2 BLDA: 51 MMHG (ref 40–60)
POC BE: 3 MMOL/L
POC SATURATED O2: 85 % (ref 95–100)
POC TCO2: 29 MMOL/L (ref 24–29)
POTASSIUM SERPL-SCNC: 3.7 MMOL/L (ref 3.5–5.1)
PROT SERPL-MCNC: 6.9 G/DL (ref 6–8.4)
RBC # BLD AUTO: 3.76 M/UL (ref 4.6–6.2)
SAMPLE: ABNORMAL
SARS-COV-2 RDRP RESP QL NAA+PROBE: NEGATIVE
SITE: ABNORMAL
SODIUM SERPL-SCNC: 136 MMOL/L (ref 136–145)
SP02: 95
TROPONIN I SERPL DL<=0.01 NG/ML-MCNC: 0.01 NG/ML (ref 0–0.03)
WBC # BLD AUTO: 7.35 K/UL (ref 3.9–12.7)

## 2021-12-09 PROCEDURE — U0002 COVID-19 LAB TEST NON-CDC: HCPCS | Performed by: EMERGENCY MEDICINE

## 2021-12-09 PROCEDURE — 93010 ELECTROCARDIOGRAM REPORT: CPT | Mod: 76,,, | Performed by: INTERNAL MEDICINE

## 2021-12-09 PROCEDURE — 25000242 PHARM REV CODE 250 ALT 637 W/ HCPCS: Performed by: EMERGENCY MEDICINE

## 2021-12-09 PROCEDURE — 84484 ASSAY OF TROPONIN QUANT: CPT | Performed by: EMERGENCY MEDICINE

## 2021-12-09 PROCEDURE — 93010 ELECTROCARDIOGRAM REPORT: CPT | Mod: ,,, | Performed by: INTERNAL MEDICINE

## 2021-12-09 PROCEDURE — 93005 ELECTROCARDIOGRAM TRACING: CPT

## 2021-12-09 PROCEDURE — 93010 EKG 12-LEAD: ICD-10-PCS | Mod: ,,, | Performed by: INTERNAL MEDICINE

## 2021-12-09 PROCEDURE — G0378 HOSPITAL OBSERVATION PER HR: HCPCS

## 2021-12-09 PROCEDURE — 99285 EMERGENCY DEPT VISIT HI MDM: CPT | Mod: 25

## 2021-12-09 PROCEDURE — 94644 CONT INHLJ TX 1ST HOUR: CPT

## 2021-12-09 PROCEDURE — 27000221 HC OXYGEN, UP TO 24 HOURS

## 2021-12-09 PROCEDURE — 99900035 HC TECH TIME PER 15 MIN (STAT)

## 2021-12-09 PROCEDURE — 63600175 PHARM REV CODE 636 W HCPCS: Performed by: NURSE PRACTITIONER

## 2021-12-09 PROCEDURE — 82803 BLOOD GASES ANY COMBINATION: CPT

## 2021-12-09 PROCEDURE — 83880 ASSAY OF NATRIURETIC PEPTIDE: CPT | Performed by: EMERGENCY MEDICINE

## 2021-12-09 PROCEDURE — 96366 THER/PROPH/DIAG IV INF ADDON: CPT

## 2021-12-09 PROCEDURE — 85025 COMPLETE CBC W/AUTO DIFF WBC: CPT | Performed by: EMERGENCY MEDICINE

## 2021-12-09 PROCEDURE — 96375 TX/PRO/DX INJ NEW DRUG ADDON: CPT

## 2021-12-09 PROCEDURE — 25000003 PHARM REV CODE 250: Performed by: EMERGENCY MEDICINE

## 2021-12-09 PROCEDURE — 94761 N-INVAS EAR/PLS OXIMETRY MLT: CPT

## 2021-12-09 PROCEDURE — 96365 THER/PROPH/DIAG IV INF INIT: CPT

## 2021-12-09 PROCEDURE — 94645 CONT INHLJ TX EACH ADDL HOUR: CPT

## 2021-12-09 PROCEDURE — 63600175 PHARM REV CODE 636 W HCPCS: Performed by: EMERGENCY MEDICINE

## 2021-12-09 PROCEDURE — 80053 COMPREHEN METABOLIC PANEL: CPT | Performed by: EMERGENCY MEDICINE

## 2021-12-09 RX ORDER — IPRATROPIUM BROMIDE 0.5 MG/2.5ML
500 SOLUTION RESPIRATORY (INHALATION)
Status: COMPLETED | OUTPATIENT
Start: 2021-12-09 | End: 2021-12-09

## 2021-12-09 RX ORDER — MAGNESIUM SULFATE HEPTAHYDRATE 40 MG/ML
2 INJECTION, SOLUTION INTRAVENOUS
Status: COMPLETED | OUTPATIENT
Start: 2021-12-09 | End: 2021-12-09

## 2021-12-09 RX ORDER — IPRATROPIUM BROMIDE AND ALBUTEROL SULFATE 2.5; .5 MG/3ML; MG/3ML
3 SOLUTION RESPIRATORY (INHALATION) EVERY 4 HOURS
Status: DISPENSED | OUTPATIENT
Start: 2021-12-09 | End: 2021-12-10

## 2021-12-09 RX ORDER — ALBUTEROL SULFATE 2.5 MG/.5ML
10 SOLUTION RESPIRATORY (INHALATION)
Status: COMPLETED | OUTPATIENT
Start: 2021-12-09 | End: 2021-12-09

## 2021-12-09 RX ORDER — FUROSEMIDE 10 MG/ML
20 INJECTION INTRAMUSCULAR; INTRAVENOUS
Status: COMPLETED | OUTPATIENT
Start: 2021-12-09 | End: 2021-12-09

## 2021-12-09 RX ORDER — SODIUM CHLORIDE 0.9 % (FLUSH) 0.9 %
10 SYRINGE (ML) INJECTION
Status: CANCELLED | OUTPATIENT
Start: 2021-12-09

## 2021-12-09 RX ORDER — ASPIRIN 325 MG
325 TABLET ORAL
Status: COMPLETED | OUTPATIENT
Start: 2021-12-09 | End: 2021-12-09

## 2021-12-09 RX ADMIN — ALBUTEROL SULFATE 10 MG: 2.5 SOLUTION RESPIRATORY (INHALATION) at 07:12

## 2021-12-09 RX ADMIN — MAGNESIUM SULFATE 2 G: 2 INJECTION INTRAVENOUS at 05:12

## 2021-12-09 RX ADMIN — METHYLPREDNISOLONE SODIUM SUCCINATE 80 MG: 40 INJECTION, POWDER, FOR SOLUTION INTRAMUSCULAR; INTRAVENOUS at 10:12

## 2021-12-09 RX ADMIN — FUROSEMIDE 20 MG: 10 INJECTION, SOLUTION INTRAMUSCULAR; INTRAVENOUS at 08:12

## 2021-12-09 RX ADMIN — IPRATROPIUM BROMIDE 500 MCG: 0.5 SOLUTION RESPIRATORY (INHALATION) at 05:12

## 2021-12-09 RX ADMIN — ASPIRIN 325 MG ORAL TABLET 325 MG: 325 PILL ORAL at 05:12

## 2021-12-09 RX ADMIN — IPRATROPIUM BROMIDE 500 MCG: 0.5 SOLUTION RESPIRATORY (INHALATION) at 07:12

## 2021-12-09 RX ADMIN — ALBUTEROL SULFATE 10 MG: 2.5 SOLUTION RESPIRATORY (INHALATION) at 05:12

## 2021-12-10 ENCOUNTER — EXTERNAL HOME HEALTH (OUTPATIENT)
Dept: HOME HEALTH SERVICES | Facility: HOSPITAL | Age: 71
End: 2021-12-10
Payer: MEDICARE

## 2021-12-10 PROBLEM — J96.01 ACUTE RESPIRATORY FAILURE WITH HYPOXIA AND HYPERCAPNIA: Status: RESOLVED | Noted: 2020-12-05 | Resolved: 2021-12-10

## 2021-12-10 PROBLEM — I50.33 ACUTE ON CHRONIC DIASTOLIC (CONGESTIVE) HEART FAILURE: Chronic | Status: ACTIVE | Noted: 2021-09-05

## 2021-12-10 PROBLEM — J96.02 ACUTE RESPIRATORY FAILURE WITH HYPOXIA AND HYPERCAPNIA: Status: RESOLVED | Noted: 2020-12-05 | Resolved: 2021-12-10

## 2021-12-10 PROBLEM — D63.8 ANEMIA OF CHRONIC DISEASE: Status: ACTIVE | Noted: 2017-03-10

## 2021-12-10 LAB
ANION GAP SERPL CALC-SCNC: 12 MMOL/L (ref 8–16)
BASOPHILS # BLD AUTO: 0.03 K/UL (ref 0–0.2)
BASOPHILS NFR BLD: 0.5 % (ref 0–1.9)
BUN SERPL-MCNC: 65 MG/DL (ref 8–23)
CALCIUM SERPL-MCNC: 9.1 MG/DL (ref 8.7–10.5)
CHLORIDE SERPL-SCNC: 101 MMOL/L (ref 95–110)
CO2 SERPL-SCNC: 26 MMOL/L (ref 23–29)
CREAT SERPL-MCNC: 3.9 MG/DL (ref 0.5–1.4)
DIFFERENTIAL METHOD: ABNORMAL
EOSINOPHIL # BLD AUTO: 0 K/UL (ref 0–0.5)
EOSINOPHIL NFR BLD: 0.2 % (ref 0–8)
ERYTHROCYTE [DISTWIDTH] IN BLOOD BY AUTOMATED COUNT: 14.1 % (ref 11.5–14.5)
EST. GFR  (AFRICAN AMERICAN): 17 ML/MIN/1.73 M^2
EST. GFR  (NON AFRICAN AMERICAN): 15 ML/MIN/1.73 M^2
ESTIMATED AVG GLUCOSE: 163 MG/DL (ref 68–131)
GLUCOSE SERPL-MCNC: 107 MG/DL (ref 70–110)
HBA1C MFR BLD: 7.3 % (ref 4–5.6)
HCT VFR BLD AUTO: 34 % (ref 40–54)
HGB BLD-MCNC: 11 G/DL (ref 14–18)
IMM GRANULOCYTES # BLD AUTO: 0.01 K/UL (ref 0–0.04)
IMM GRANULOCYTES NFR BLD AUTO: 0.2 % (ref 0–0.5)
LYMPHOCYTES # BLD AUTO: 0.5 K/UL (ref 1–4.8)
LYMPHOCYTES NFR BLD: 6.9 % (ref 18–48)
MAGNESIUM SERPL-MCNC: 2.5 MG/DL (ref 1.6–2.6)
MCH RBC QN AUTO: 27.6 PG (ref 27–31)
MCHC RBC AUTO-ENTMCNC: 32.4 G/DL (ref 32–36)
MCV RBC AUTO: 85 FL (ref 82–98)
MONOCYTES # BLD AUTO: 0.2 K/UL (ref 0.3–1)
MONOCYTES NFR BLD: 2.5 % (ref 4–15)
NEUTROPHILS # BLD AUTO: 5.8 K/UL (ref 1.8–7.7)
NEUTROPHILS NFR BLD: 89.7 % (ref 38–73)
NRBC BLD-RTO: 0 /100 WBC
PHOSPHATE SERPL-MCNC: 5.7 MG/DL (ref 2.7–4.5)
PLATELET # BLD AUTO: 346 K/UL (ref 150–450)
PMV BLD AUTO: 9.5 FL (ref 9.2–12.9)
POCT GLUCOSE: 106 MG/DL (ref 70–110)
POCT GLUCOSE: 130 MG/DL (ref 70–110)
POCT GLUCOSE: 202 MG/DL (ref 70–110)
POTASSIUM SERPL-SCNC: 4.8 MMOL/L (ref 3.5–5.1)
RBC # BLD AUTO: 3.99 M/UL (ref 4.6–6.2)
SODIUM SERPL-SCNC: 139 MMOL/L (ref 136–145)
TROPONIN I SERPL DL<=0.01 NG/ML-MCNC: 0.01 NG/ML (ref 0–0.03)
TROPONIN I SERPL DL<=0.01 NG/ML-MCNC: 0.02 NG/ML (ref 0–0.03)
WBC # BLD AUTO: 6.5 K/UL (ref 3.9–12.7)

## 2021-12-10 PROCEDURE — 25000003 PHARM REV CODE 250: Performed by: NURSE PRACTITIONER

## 2021-12-10 PROCEDURE — 84100 ASSAY OF PHOSPHORUS: CPT | Performed by: NURSE PRACTITIONER

## 2021-12-10 PROCEDURE — 83735 ASSAY OF MAGNESIUM: CPT | Performed by: NURSE PRACTITIONER

## 2021-12-10 PROCEDURE — 63600175 PHARM REV CODE 636 W HCPCS: Performed by: NURSE PRACTITIONER

## 2021-12-10 PROCEDURE — 36415 COLL VENOUS BLD VENIPUNCTURE: CPT | Performed by: NURSE PRACTITIONER

## 2021-12-10 PROCEDURE — 25000242 PHARM REV CODE 250 ALT 637 W/ HCPCS: Performed by: NURSE PRACTITIONER

## 2021-12-10 PROCEDURE — 94640 AIRWAY INHALATION TREATMENT: CPT

## 2021-12-10 PROCEDURE — 94645 CONT INHLJ TX EACH ADDL HOUR: CPT

## 2021-12-10 PROCEDURE — 85025 COMPLETE CBC W/AUTO DIFF WBC: CPT | Performed by: NURSE PRACTITIONER

## 2021-12-10 PROCEDURE — G0378 HOSPITAL OBSERVATION PER HR: HCPCS

## 2021-12-10 PROCEDURE — 84484 ASSAY OF TROPONIN QUANT: CPT | Mod: 91 | Performed by: NURSE PRACTITIONER

## 2021-12-10 PROCEDURE — 27000221 HC OXYGEN, UP TO 24 HOURS

## 2021-12-10 PROCEDURE — 83036 HEMOGLOBIN GLYCOSYLATED A1C: CPT | Performed by: NURSE PRACTITIONER

## 2021-12-10 PROCEDURE — 99220 PR INITIAL OBSERVATION CARE,LEVL III: CPT | Mod: ,,, | Performed by: INTERNAL MEDICINE

## 2021-12-10 PROCEDURE — 94760 N-INVAS EAR/PLS OXIMETRY 1: CPT

## 2021-12-10 PROCEDURE — 99220 PR INITIAL OBSERVATION CARE,LEVL III: ICD-10-PCS | Mod: ,,, | Performed by: INTERNAL MEDICINE

## 2021-12-10 PROCEDURE — 80048 BASIC METABOLIC PNL TOTAL CA: CPT | Performed by: NURSE PRACTITIONER

## 2021-12-10 RX ORDER — FLUTICASONE FUROATE AND VILANTEROL 100; 25 UG/1; UG/1
1 POWDER RESPIRATORY (INHALATION) DAILY
Status: DISCONTINUED | OUTPATIENT
Start: 2021-12-10 | End: 2021-12-10

## 2021-12-10 RX ORDER — GLUCAGON 1 MG
1 KIT INJECTION
Status: DISCONTINUED | OUTPATIENT
Start: 2021-12-10 | End: 2021-12-15 | Stop reason: HOSPADM

## 2021-12-10 RX ORDER — NALOXONE HCL 0.4 MG/ML
0.02 VIAL (ML) INJECTION
Status: DISCONTINUED | OUTPATIENT
Start: 2021-12-10 | End: 2021-12-15 | Stop reason: HOSPADM

## 2021-12-10 RX ORDER — FUROSEMIDE 10 MG/ML
40 INJECTION INTRAMUSCULAR; INTRAVENOUS
Status: DISCONTINUED | OUTPATIENT
Start: 2021-12-10 | End: 2021-12-11

## 2021-12-10 RX ORDER — FLUTICASONE FUROATE AND VILANTEROL 100; 25 UG/1; UG/1
1 POWDER RESPIRATORY (INHALATION) DAILY
Status: DISCONTINUED | OUTPATIENT
Start: 2021-12-10 | End: 2021-12-15 | Stop reason: HOSPADM

## 2021-12-10 RX ORDER — IBUPROFEN 200 MG
24 TABLET ORAL
Status: DISCONTINUED | OUTPATIENT
Start: 2021-12-10 | End: 2021-12-15 | Stop reason: HOSPADM

## 2021-12-10 RX ORDER — INSULIN ASPART 100 [IU]/ML
0-5 INJECTION, SOLUTION INTRAVENOUS; SUBCUTANEOUS EVERY 6 HOURS PRN
Status: DISCONTINUED | OUTPATIENT
Start: 2021-12-10 | End: 2021-12-15 | Stop reason: HOSPADM

## 2021-12-10 RX ORDER — ATORVASTATIN CALCIUM 40 MG/1
80 TABLET, FILM COATED ORAL DAILY
Status: DISCONTINUED | OUTPATIENT
Start: 2021-12-10 | End: 2021-12-15 | Stop reason: HOSPADM

## 2021-12-10 RX ORDER — PREDNISONE 20 MG/1
40 TABLET ORAL DAILY
Status: DISCONTINUED | OUTPATIENT
Start: 2021-12-11 | End: 2021-12-15 | Stop reason: HOSPADM

## 2021-12-10 RX ORDER — ACETAMINOPHEN 325 MG/1
650 TABLET ORAL EVERY 4 HOURS PRN
Status: DISCONTINUED | OUTPATIENT
Start: 2021-12-10 | End: 2021-12-15 | Stop reason: HOSPADM

## 2021-12-10 RX ORDER — ONDANSETRON 2 MG/ML
4 INJECTION INTRAMUSCULAR; INTRAVENOUS EVERY 8 HOURS PRN
Status: DISCONTINUED | OUTPATIENT
Start: 2021-12-10 | End: 2021-12-15 | Stop reason: HOSPADM

## 2021-12-10 RX ORDER — FAMOTIDINE 20 MG/1
20 TABLET, FILM COATED ORAL DAILY
Status: DISCONTINUED | OUTPATIENT
Start: 2021-12-10 | End: 2021-12-15 | Stop reason: HOSPADM

## 2021-12-10 RX ORDER — TALC
6 POWDER (GRAM) TOPICAL NIGHTLY PRN
Status: DISCONTINUED | OUTPATIENT
Start: 2021-12-10 | End: 2021-12-15 | Stop reason: HOSPADM

## 2021-12-10 RX ORDER — IBUPROFEN 200 MG
16 TABLET ORAL
Status: DISCONTINUED | OUTPATIENT
Start: 2021-12-10 | End: 2021-12-15 | Stop reason: HOSPADM

## 2021-12-10 RX ORDER — FAMOTIDINE 20 MG/1
40 TABLET, FILM COATED ORAL DAILY
Status: DISCONTINUED | OUTPATIENT
Start: 2021-12-10 | End: 2021-12-10 | Stop reason: DRUGHIGH

## 2021-12-10 RX ORDER — TAMSULOSIN HYDROCHLORIDE 0.4 MG/1
0.4 CAPSULE ORAL DAILY
Status: DISCONTINUED | OUTPATIENT
Start: 2021-12-10 | End: 2021-12-15 | Stop reason: HOSPADM

## 2021-12-10 RX ORDER — SODIUM CHLORIDE 0.9 % (FLUSH) 0.9 %
10 SYRINGE (ML) INJECTION EVERY 12 HOURS PRN
Status: DISCONTINUED | OUTPATIENT
Start: 2021-12-10 | End: 2021-12-15 | Stop reason: HOSPADM

## 2021-12-10 RX ORDER — NAPROXEN SODIUM 220 MG/1
81 TABLET, FILM COATED ORAL DAILY
Status: DISCONTINUED | OUTPATIENT
Start: 2021-12-10 | End: 2021-12-15 | Stop reason: HOSPADM

## 2021-12-10 RX ADMIN — INSULIN ASPART 2 UNITS: 100 INJECTION, SOLUTION INTRAVENOUS; SUBCUTANEOUS at 06:12

## 2021-12-10 RX ADMIN — APIXABAN 5 MG: 5 TABLET, FILM COATED ORAL at 09:12

## 2021-12-10 RX ADMIN — APIXABAN 5 MG: 5 TABLET, FILM COATED ORAL at 08:12

## 2021-12-10 RX ADMIN — METHYLPREDNISOLONE SODIUM SUCCINATE 80 MG: 40 INJECTION, POWDER, FOR SOLUTION INTRAMUSCULAR; INTRAVENOUS at 02:12

## 2021-12-10 RX ADMIN — FAMOTIDINE 20 MG: 20 TABLET ORAL at 09:12

## 2021-12-10 RX ADMIN — METHYLPREDNISOLONE SODIUM SUCCINATE 80 MG: 40 INJECTION, POWDER, FOR SOLUTION INTRAMUSCULAR; INTRAVENOUS at 05:12

## 2021-12-10 RX ADMIN — ASPIRIN 81 MG CHEWABLE TABLET 81 MG: 81 TABLET CHEWABLE at 09:12

## 2021-12-10 RX ADMIN — IPRATROPIUM BROMIDE AND ALBUTEROL SULFATE 3 ML: .5; 3 SOLUTION RESPIRATORY (INHALATION) at 09:12

## 2021-12-10 RX ADMIN — FUROSEMIDE 40 MG: 10 INJECTION, SOLUTION INTRAMUSCULAR; INTRAVENOUS at 02:12

## 2021-12-10 RX ADMIN — TAMSULOSIN HYDROCHLORIDE 0.4 MG: 0.4 CAPSULE ORAL at 09:12

## 2021-12-10 RX ADMIN — FUROSEMIDE 40 MG: 10 INJECTION, SOLUTION INTRAMUSCULAR; INTRAVENOUS at 01:12

## 2021-12-10 RX ADMIN — IPRATROPIUM BROMIDE AND ALBUTEROL SULFATE 3 ML: .5; 3 SOLUTION RESPIRATORY (INHALATION) at 03:12

## 2021-12-10 RX ADMIN — ATORVASTATIN CALCIUM 80 MG: 40 TABLET, FILM COATED ORAL at 09:12

## 2021-12-11 LAB
ANION GAP SERPL CALC-SCNC: 17 MMOL/L (ref 8–16)
BUN SERPL-MCNC: 81 MG/DL (ref 8–23)
CALCIUM SERPL-MCNC: 9.2 MG/DL (ref 8.7–10.5)
CHLORIDE SERPL-SCNC: 95 MMOL/L (ref 95–110)
CO2 SERPL-SCNC: 24 MMOL/L (ref 23–29)
CREAT SERPL-MCNC: 4.3 MG/DL (ref 0.5–1.4)
EST. GFR  (AFRICAN AMERICAN): 15 ML/MIN/1.73 M^2
EST. GFR  (NON AFRICAN AMERICAN): 13 ML/MIN/1.73 M^2
GLUCOSE SERPL-MCNC: 285 MG/DL (ref 70–110)
POCT GLUCOSE: 252 MG/DL (ref 70–110)
POCT GLUCOSE: 296 MG/DL (ref 70–110)
POCT GLUCOSE: 304 MG/DL (ref 70–110)
POCT GLUCOSE: 312 MG/DL (ref 70–110)
POCT GLUCOSE: 317 MG/DL (ref 70–110)
POTASSIUM SERPL-SCNC: 4.2 MMOL/L (ref 3.5–5.1)
SODIUM SERPL-SCNC: 136 MMOL/L (ref 136–145)

## 2021-12-11 PROCEDURE — 25000003 PHARM REV CODE 250: Performed by: NURSE PRACTITIONER

## 2021-12-11 PROCEDURE — 94640 AIRWAY INHALATION TREATMENT: CPT

## 2021-12-11 PROCEDURE — 36415 COLL VENOUS BLD VENIPUNCTURE: CPT | Performed by: STUDENT IN AN ORGANIZED HEALTH CARE EDUCATION/TRAINING PROGRAM

## 2021-12-11 PROCEDURE — 94761 N-INVAS EAR/PLS OXIMETRY MLT: CPT

## 2021-12-11 PROCEDURE — 21400001 HC TELEMETRY ROOM

## 2021-12-11 PROCEDURE — 63600175 PHARM REV CODE 636 W HCPCS: Performed by: NURSE PRACTITIONER

## 2021-12-11 PROCEDURE — 63600175 PHARM REV CODE 636 W HCPCS: Performed by: STUDENT IN AN ORGANIZED HEALTH CARE EDUCATION/TRAINING PROGRAM

## 2021-12-11 PROCEDURE — 25000242 PHARM REV CODE 250 ALT 637 W/ HCPCS: Performed by: HOSPITALIST

## 2021-12-11 PROCEDURE — 25000003 PHARM REV CODE 250: Performed by: STUDENT IN AN ORGANIZED HEALTH CARE EDUCATION/TRAINING PROGRAM

## 2021-12-11 PROCEDURE — 63600175 PHARM REV CODE 636 W HCPCS: Performed by: INTERNAL MEDICINE

## 2021-12-11 PROCEDURE — C9399 UNCLASSIFIED DRUGS OR BIOLOG: HCPCS | Performed by: STUDENT IN AN ORGANIZED HEALTH CARE EDUCATION/TRAINING PROGRAM

## 2021-12-11 PROCEDURE — 80048 BASIC METABOLIC PNL TOTAL CA: CPT | Performed by: STUDENT IN AN ORGANIZED HEALTH CARE EDUCATION/TRAINING PROGRAM

## 2021-12-11 RX ORDER — INSULIN ASPART 100 [IU]/ML
5 INJECTION, SOLUTION INTRAVENOUS; SUBCUTANEOUS
Status: DISCONTINUED | OUTPATIENT
Start: 2021-12-11 | End: 2021-12-12

## 2021-12-11 RX ORDER — FUROSEMIDE 10 MG/ML
80 INJECTION INTRAMUSCULAR; INTRAVENOUS ONCE
Status: COMPLETED | OUTPATIENT
Start: 2021-12-11 | End: 2021-12-11

## 2021-12-11 RX ADMIN — ASPIRIN 81 MG CHEWABLE TABLET 81 MG: 81 TABLET CHEWABLE at 09:12

## 2021-12-11 RX ADMIN — FUROSEMIDE 80 MG: 10 INJECTION, SOLUTION INTRAMUSCULAR; INTRAVENOUS at 06:12

## 2021-12-11 RX ADMIN — FUROSEMIDE 40 MG: 10 INJECTION, SOLUTION INTRAMUSCULAR; INTRAVENOUS at 12:12

## 2021-12-11 RX ADMIN — INSULIN ASPART 4 UNITS: 100 INJECTION, SOLUTION INTRAVENOUS; SUBCUTANEOUS at 05:12

## 2021-12-11 RX ADMIN — FLUTICASONE FUROATE AND VILANTEROL TRIFENATATE 1 PUFF: 100; 25 POWDER RESPIRATORY (INHALATION) at 07:12

## 2021-12-11 RX ADMIN — INSULIN ASPART 5 UNITS: 100 INJECTION, SOLUTION INTRAVENOUS; SUBCUTANEOUS at 06:12

## 2021-12-11 RX ADMIN — INSULIN DETEMIR 8 UNITS: 100 INJECTION, SOLUTION SUBCUTANEOUS at 08:12

## 2021-12-11 RX ADMIN — PREDNISONE 40 MG: 20 TABLET ORAL at 09:12

## 2021-12-11 RX ADMIN — INSULIN ASPART 5 UNITS: 100 INJECTION, SOLUTION INTRAVENOUS; SUBCUTANEOUS at 09:12

## 2021-12-11 RX ADMIN — APIXABAN 5 MG: 5 TABLET, FILM COATED ORAL at 08:12

## 2021-12-11 RX ADMIN — INSULIN ASPART 5 UNITS: 100 INJECTION, SOLUTION INTRAVENOUS; SUBCUTANEOUS at 01:12

## 2021-12-11 RX ADMIN — APIXABAN 5 MG: 5 TABLET, FILM COATED ORAL at 09:12

## 2021-12-11 RX ADMIN — INSULIN ASPART 1 UNITS: 100 INJECTION, SOLUTION INTRAVENOUS; SUBCUTANEOUS at 12:12

## 2021-12-11 RX ADMIN — FAMOTIDINE 20 MG: 20 TABLET ORAL at 09:12

## 2021-12-11 RX ADMIN — ATORVASTATIN CALCIUM 80 MG: 40 TABLET, FILM COATED ORAL at 09:12

## 2021-12-11 RX ADMIN — TAMSULOSIN HYDROCHLORIDE 0.4 MG: 0.4 CAPSULE ORAL at 09:12

## 2021-12-12 LAB
ALBUMIN SERPL BCP-MCNC: 3.7 G/DL (ref 3.5–5.2)
ALP SERPL-CCNC: 74 U/L (ref 55–135)
ALT SERPL W/O P-5'-P-CCNC: 11 U/L (ref 10–44)
ANION GAP SERPL CALC-SCNC: 14 MMOL/L (ref 8–16)
ANION GAP SERPL CALC-SCNC: 14 MMOL/L (ref 8–16)
AST SERPL-CCNC: 10 U/L (ref 10–40)
BILIRUB SERPL-MCNC: 0.4 MG/DL (ref 0.1–1)
BUN SERPL-MCNC: 99 MG/DL (ref 8–23)
BUN SERPL-MCNC: 99 MG/DL (ref 8–23)
CALCIUM SERPL-MCNC: 9 MG/DL (ref 8.7–10.5)
CALCIUM SERPL-MCNC: 9 MG/DL (ref 8.7–10.5)
CHLORIDE SERPL-SCNC: 98 MMOL/L (ref 95–110)
CHLORIDE SERPL-SCNC: 98 MMOL/L (ref 95–110)
CO2 SERPL-SCNC: 24 MMOL/L (ref 23–29)
CO2 SERPL-SCNC: 24 MMOL/L (ref 23–29)
CREAT SERPL-MCNC: 4.6 MG/DL (ref 0.5–1.4)
CREAT SERPL-MCNC: 4.6 MG/DL (ref 0.5–1.4)
EST. GFR  (AFRICAN AMERICAN): 14 ML/MIN/1.73 M^2
EST. GFR  (AFRICAN AMERICAN): 14 ML/MIN/1.73 M^2
EST. GFR  (NON AFRICAN AMERICAN): 12 ML/MIN/1.73 M^2
EST. GFR  (NON AFRICAN AMERICAN): 12 ML/MIN/1.73 M^2
GLUCOSE SERPL-MCNC: 302 MG/DL (ref 70–110)
GLUCOSE SERPL-MCNC: 302 MG/DL (ref 70–110)
POCT GLUCOSE: 236 MG/DL (ref 70–110)
POCT GLUCOSE: 323 MG/DL (ref 70–110)
POCT GLUCOSE: 347 MG/DL (ref 70–110)
POCT GLUCOSE: 349 MG/DL (ref 70–110)
POCT GLUCOSE: 361 MG/DL (ref 70–110)
POTASSIUM SERPL-SCNC: 4 MMOL/L (ref 3.5–5.1)
POTASSIUM SERPL-SCNC: 4 MMOL/L (ref 3.5–5.1)
PROT SERPL-MCNC: 6.9 G/DL (ref 6–8.4)
SODIUM SERPL-SCNC: 136 MMOL/L (ref 136–145)
SODIUM SERPL-SCNC: 136 MMOL/L (ref 136–145)

## 2021-12-12 PROCEDURE — 21400001 HC TELEMETRY ROOM

## 2021-12-12 PROCEDURE — 36415 COLL VENOUS BLD VENIPUNCTURE: CPT | Performed by: INTERNAL MEDICINE

## 2021-12-12 PROCEDURE — 63600175 PHARM REV CODE 636 W HCPCS: Performed by: STUDENT IN AN ORGANIZED HEALTH CARE EDUCATION/TRAINING PROGRAM

## 2021-12-12 PROCEDURE — 25000242 PHARM REV CODE 250 ALT 637 W/ HCPCS: Performed by: STUDENT IN AN ORGANIZED HEALTH CARE EDUCATION/TRAINING PROGRAM

## 2021-12-12 PROCEDURE — 94761 N-INVAS EAR/PLS OXIMETRY MLT: CPT

## 2021-12-12 PROCEDURE — 25000003 PHARM REV CODE 250: Performed by: NURSE PRACTITIONER

## 2021-12-12 PROCEDURE — 94640 AIRWAY INHALATION TREATMENT: CPT

## 2021-12-12 PROCEDURE — 63600175 PHARM REV CODE 636 W HCPCS: Performed by: INTERNAL MEDICINE

## 2021-12-12 PROCEDURE — 27000221 HC OXYGEN, UP TO 24 HOURS

## 2021-12-12 PROCEDURE — 99900035 HC TECH TIME PER 15 MIN (STAT)

## 2021-12-12 PROCEDURE — 25000003 PHARM REV CODE 250: Performed by: INTERNAL MEDICINE

## 2021-12-12 PROCEDURE — 25000003 PHARM REV CODE 250: Performed by: STUDENT IN AN ORGANIZED HEALTH CARE EDUCATION/TRAINING PROGRAM

## 2021-12-12 PROCEDURE — 80053 COMPREHEN METABOLIC PANEL: CPT | Performed by: INTERNAL MEDICINE

## 2021-12-12 RX ORDER — FUROSEMIDE 10 MG/ML
80 INJECTION INTRAMUSCULAR; INTRAVENOUS 2 TIMES DAILY
Status: COMPLETED | OUTPATIENT
Start: 2021-12-12 | End: 2021-12-14

## 2021-12-12 RX ORDER — IPRATROPIUM BROMIDE AND ALBUTEROL SULFATE 2.5; .5 MG/3ML; MG/3ML
3 SOLUTION RESPIRATORY (INHALATION) EVERY 4 HOURS PRN
Status: DISCONTINUED | OUTPATIENT
Start: 2021-12-12 | End: 2021-12-15 | Stop reason: HOSPADM

## 2021-12-12 RX ORDER — GUAIFENESIN/DEXTROMETHORPHAN 100-10MG/5
5 SYRUP ORAL EVERY 4 HOURS PRN
Status: DISCONTINUED | OUTPATIENT
Start: 2021-12-12 | End: 2021-12-15 | Stop reason: HOSPADM

## 2021-12-12 RX ORDER — METOLAZONE 5 MG/1
10 TABLET ORAL DAILY
Status: COMPLETED | OUTPATIENT
Start: 2021-12-12 | End: 2021-12-14

## 2021-12-12 RX ORDER — INSULIN ASPART 100 [IU]/ML
8 INJECTION, SOLUTION INTRAVENOUS; SUBCUTANEOUS
Status: DISCONTINUED | OUTPATIENT
Start: 2021-12-12 | End: 2021-12-14

## 2021-12-12 RX ADMIN — FAMOTIDINE 20 MG: 20 TABLET ORAL at 09:12

## 2021-12-12 RX ADMIN — PREDNISONE 40 MG: 20 TABLET ORAL at 09:12

## 2021-12-12 RX ADMIN — METOLAZONE 10 MG: 5 TABLET ORAL at 02:12

## 2021-12-12 RX ADMIN — FUROSEMIDE 80 MG: 10 INJECTION, SOLUTION INTRAMUSCULAR; INTRAVENOUS at 02:12

## 2021-12-12 RX ADMIN — INSULIN ASPART 8 UNITS: 100 INJECTION, SOLUTION INTRAVENOUS; SUBCUTANEOUS at 06:12

## 2021-12-12 RX ADMIN — ASPIRIN 81 MG CHEWABLE TABLET 81 MG: 81 TABLET CHEWABLE at 09:12

## 2021-12-12 RX ADMIN — INSULIN ASPART 5 UNITS: 100 INJECTION, SOLUTION INTRAVENOUS; SUBCUTANEOUS at 09:12

## 2021-12-12 RX ADMIN — FLUTICASONE FUROATE AND VILANTEROL TRIFENATATE 1 PUFF: 100; 25 POWDER RESPIRATORY (INHALATION) at 07:12

## 2021-12-12 RX ADMIN — ATORVASTATIN CALCIUM 80 MG: 40 TABLET, FILM COATED ORAL at 09:12

## 2021-12-12 RX ADMIN — APIXABAN 5 MG: 5 TABLET, FILM COATED ORAL at 09:12

## 2021-12-12 RX ADMIN — FUROSEMIDE 80 MG: 10 INJECTION, SOLUTION INTRAMUSCULAR; INTRAVENOUS at 09:12

## 2021-12-12 RX ADMIN — TAMSULOSIN HYDROCHLORIDE 0.4 MG: 0.4 CAPSULE ORAL at 09:12

## 2021-12-12 RX ADMIN — INSULIN ASPART 5 UNITS: 100 INJECTION, SOLUTION INTRAVENOUS; SUBCUTANEOUS at 02:12

## 2021-12-12 RX ADMIN — IPRATROPIUM BROMIDE AND ALBUTEROL SULFATE 3 ML: .5; 3 SOLUTION RESPIRATORY (INHALATION) at 02:12

## 2021-12-12 RX ADMIN — GUAIFENESIN AND DEXTROMETHORPHAN 5 ML: 100; 10 SYRUP ORAL at 09:12

## 2021-12-13 LAB
ANION GAP SERPL CALC-SCNC: 15 MMOL/L (ref 8–16)
BUN SERPL-MCNC: 99 MG/DL (ref 8–23)
CALCIUM SERPL-MCNC: 9.1 MG/DL (ref 8.7–10.5)
CHLORIDE SERPL-SCNC: 96 MMOL/L (ref 95–110)
CO2 SERPL-SCNC: 24 MMOL/L (ref 23–29)
CREAT SERPL-MCNC: 3.9 MG/DL (ref 0.5–1.4)
EST. GFR  (AFRICAN AMERICAN): 17 ML/MIN/1.73 M^2
EST. GFR  (NON AFRICAN AMERICAN): 15 ML/MIN/1.73 M^2
GLUCOSE SERPL-MCNC: 365 MG/DL (ref 70–110)
POCT GLUCOSE: 268 MG/DL (ref 70–110)
POCT GLUCOSE: 282 MG/DL (ref 70–110)
POCT GLUCOSE: 291 MG/DL (ref 70–110)
POCT GLUCOSE: 294 MG/DL (ref 70–110)
POTASSIUM SERPL-SCNC: 4.1 MMOL/L (ref 3.5–5.1)
SODIUM SERPL-SCNC: 135 MMOL/L (ref 136–145)

## 2021-12-13 PROCEDURE — 94640 AIRWAY INHALATION TREATMENT: CPT

## 2021-12-13 PROCEDURE — 25000003 PHARM REV CODE 250: Performed by: STUDENT IN AN ORGANIZED HEALTH CARE EDUCATION/TRAINING PROGRAM

## 2021-12-13 PROCEDURE — 21400001 HC TELEMETRY ROOM

## 2021-12-13 PROCEDURE — 63600175 PHARM REV CODE 636 W HCPCS: Performed by: STUDENT IN AN ORGANIZED HEALTH CARE EDUCATION/TRAINING PROGRAM

## 2021-12-13 PROCEDURE — 80048 BASIC METABOLIC PNL TOTAL CA: CPT | Performed by: STUDENT IN AN ORGANIZED HEALTH CARE EDUCATION/TRAINING PROGRAM

## 2021-12-13 PROCEDURE — 25000003 PHARM REV CODE 250: Performed by: NURSE PRACTITIONER

## 2021-12-13 PROCEDURE — 63600175 PHARM REV CODE 636 W HCPCS: Performed by: INTERNAL MEDICINE

## 2021-12-13 PROCEDURE — 94760 N-INVAS EAR/PLS OXIMETRY 1: CPT

## 2021-12-13 PROCEDURE — 25000003 PHARM REV CODE 250: Performed by: INTERNAL MEDICINE

## 2021-12-13 PROCEDURE — 36415 COLL VENOUS BLD VENIPUNCTURE: CPT | Performed by: STUDENT IN AN ORGANIZED HEALTH CARE EDUCATION/TRAINING PROGRAM

## 2021-12-13 RX ORDER — AMLODIPINE BESYLATE 5 MG/1
10 TABLET ORAL DAILY
Status: DISCONTINUED | OUTPATIENT
Start: 2021-12-13 | End: 2021-12-15 | Stop reason: HOSPADM

## 2021-12-13 RX ADMIN — METOLAZONE 10 MG: 5 TABLET ORAL at 08:12

## 2021-12-13 RX ADMIN — FUROSEMIDE 80 MG: 10 INJECTION, SOLUTION INTRAMUSCULAR; INTRAVENOUS at 08:12

## 2021-12-13 RX ADMIN — APIXABAN 5 MG: 5 TABLET, FILM COATED ORAL at 08:12

## 2021-12-13 RX ADMIN — ATORVASTATIN CALCIUM 80 MG: 40 TABLET, FILM COATED ORAL at 08:12

## 2021-12-13 RX ADMIN — INSULIN ASPART 8 UNITS: 100 INJECTION, SOLUTION INTRAVENOUS; SUBCUTANEOUS at 04:12

## 2021-12-13 RX ADMIN — TAMSULOSIN HYDROCHLORIDE 0.4 MG: 0.4 CAPSULE ORAL at 08:12

## 2021-12-13 RX ADMIN — INSULIN ASPART 2 UNITS: 100 INJECTION, SOLUTION INTRAVENOUS; SUBCUTANEOUS at 08:12

## 2021-12-13 RX ADMIN — FAMOTIDINE 20 MG: 20 TABLET ORAL at 08:12

## 2021-12-13 RX ADMIN — INSULIN ASPART 8 UNITS: 100 INJECTION, SOLUTION INTRAVENOUS; SUBCUTANEOUS at 11:12

## 2021-12-13 RX ADMIN — PREDNISONE 40 MG: 20 TABLET ORAL at 08:12

## 2021-12-13 RX ADMIN — Medication 6 MG: at 08:12

## 2021-12-13 RX ADMIN — INSULIN DETEMIR 15 UNITS: 100 INJECTION, SOLUTION SUBCUTANEOUS at 08:12

## 2021-12-13 RX ADMIN — INSULIN ASPART 8 UNITS: 100 INJECTION, SOLUTION INTRAVENOUS; SUBCUTANEOUS at 08:12

## 2021-12-13 RX ADMIN — INSULIN ASPART 1 UNITS: 100 INJECTION, SOLUTION INTRAVENOUS; SUBCUTANEOUS at 08:12

## 2021-12-13 RX ADMIN — AMLODIPINE BESYLATE 10 MG: 5 TABLET ORAL at 11:12

## 2021-12-13 RX ADMIN — ASPIRIN 81 MG CHEWABLE TABLET 81 MG: 81 TABLET CHEWABLE at 08:12

## 2021-12-13 RX ADMIN — FLUTICASONE FUROATE AND VILANTEROL TRIFENATATE 1 PUFF: 100; 25 POWDER RESPIRATORY (INHALATION) at 08:12

## 2021-12-14 LAB
ANION GAP SERPL CALC-SCNC: 14 MMOL/L (ref 8–16)
BUN SERPL-MCNC: 89 MG/DL (ref 8–23)
CALCIUM SERPL-MCNC: 9.8 MG/DL (ref 8.7–10.5)
CHLORIDE SERPL-SCNC: 95 MMOL/L (ref 95–110)
CO2 SERPL-SCNC: 29 MMOL/L (ref 23–29)
CREAT SERPL-MCNC: 3 MG/DL (ref 0.5–1.4)
EST. GFR  (AFRICAN AMERICAN): 23 ML/MIN/1.73 M^2
EST. GFR  (NON AFRICAN AMERICAN): 20 ML/MIN/1.73 M^2
GLUCOSE SERPL-MCNC: 261 MG/DL (ref 70–110)
POCT GLUCOSE: 218 MG/DL (ref 70–110)
POCT GLUCOSE: 234 MG/DL (ref 70–110)
POCT GLUCOSE: 288 MG/DL (ref 70–110)
POCT GLUCOSE: 313 MG/DL (ref 70–110)
POCT GLUCOSE: 354 MG/DL (ref 70–110)
POCT GLUCOSE: 386 MG/DL (ref 70–110)
POTASSIUM SERPL-SCNC: 3.7 MMOL/L (ref 3.5–5.1)
SODIUM SERPL-SCNC: 138 MMOL/L (ref 136–145)

## 2021-12-14 PROCEDURE — 21400001 HC TELEMETRY ROOM

## 2021-12-14 PROCEDURE — 99900035 HC TECH TIME PER 15 MIN (STAT)

## 2021-12-14 PROCEDURE — 25000003 PHARM REV CODE 250: Performed by: NURSE PRACTITIONER

## 2021-12-14 PROCEDURE — C9399 UNCLASSIFIED DRUGS OR BIOLOG: HCPCS | Performed by: STUDENT IN AN ORGANIZED HEALTH CARE EDUCATION/TRAINING PROGRAM

## 2021-12-14 PROCEDURE — 36415 COLL VENOUS BLD VENIPUNCTURE: CPT | Performed by: STUDENT IN AN ORGANIZED HEALTH CARE EDUCATION/TRAINING PROGRAM

## 2021-12-14 PROCEDURE — 63600175 PHARM REV CODE 636 W HCPCS: Performed by: STUDENT IN AN ORGANIZED HEALTH CARE EDUCATION/TRAINING PROGRAM

## 2021-12-14 PROCEDURE — 80048 BASIC METABOLIC PNL TOTAL CA: CPT | Performed by: STUDENT IN AN ORGANIZED HEALTH CARE EDUCATION/TRAINING PROGRAM

## 2021-12-14 PROCEDURE — 25000003 PHARM REV CODE 250: Performed by: INTERNAL MEDICINE

## 2021-12-14 PROCEDURE — 63600175 PHARM REV CODE 636 W HCPCS: Performed by: INTERNAL MEDICINE

## 2021-12-14 PROCEDURE — 94640 AIRWAY INHALATION TREATMENT: CPT

## 2021-12-14 PROCEDURE — 94761 N-INVAS EAR/PLS OXIMETRY MLT: CPT

## 2021-12-14 PROCEDURE — 25000003 PHARM REV CODE 250: Performed by: STUDENT IN AN ORGANIZED HEALTH CARE EDUCATION/TRAINING PROGRAM

## 2021-12-14 RX ORDER — INSULIN ASPART 100 [IU]/ML
13 INJECTION, SOLUTION INTRAVENOUS; SUBCUTANEOUS
Status: DISCONTINUED | OUTPATIENT
Start: 2021-12-14 | End: 2021-12-15 | Stop reason: HOSPADM

## 2021-12-14 RX ORDER — HYDRALAZINE HYDROCHLORIDE 25 MG/1
25 TABLET, FILM COATED ORAL EVERY 8 HOURS
Status: DISCONTINUED | OUTPATIENT
Start: 2021-12-14 | End: 2021-12-15 | Stop reason: HOSPADM

## 2021-12-14 RX ADMIN — TAMSULOSIN HYDROCHLORIDE 0.4 MG: 0.4 CAPSULE ORAL at 08:12

## 2021-12-14 RX ADMIN — FUROSEMIDE 80 MG: 10 INJECTION, SOLUTION INTRAMUSCULAR; INTRAVENOUS at 09:12

## 2021-12-14 RX ADMIN — FUROSEMIDE 80 MG: 10 INJECTION, SOLUTION INTRAMUSCULAR; INTRAVENOUS at 08:12

## 2021-12-14 RX ADMIN — INSULIN ASPART 8 UNITS: 100 INJECTION, SOLUTION INTRAVENOUS; SUBCUTANEOUS at 08:12

## 2021-12-14 RX ADMIN — METOLAZONE 10 MG: 5 TABLET ORAL at 08:12

## 2021-12-14 RX ADMIN — AMLODIPINE BESYLATE 10 MG: 5 TABLET ORAL at 08:12

## 2021-12-14 RX ADMIN — INSULIN ASPART 3 UNITS: 100 INJECTION, SOLUTION INTRAVENOUS; SUBCUTANEOUS at 12:12

## 2021-12-14 RX ADMIN — APIXABAN 5 MG: 5 TABLET, FILM COATED ORAL at 08:12

## 2021-12-14 RX ADMIN — APIXABAN 5 MG: 5 TABLET, FILM COATED ORAL at 09:12

## 2021-12-14 RX ADMIN — HYDRALAZINE HYDROCHLORIDE 25 MG: 25 TABLET ORAL at 09:12

## 2021-12-14 RX ADMIN — INSULIN ASPART 2 UNITS: 100 INJECTION, SOLUTION INTRAVENOUS; SUBCUTANEOUS at 08:12

## 2021-12-14 RX ADMIN — INSULIN DETEMIR 15 UNITS: 100 INJECTION, SOLUTION SUBCUTANEOUS at 09:12

## 2021-12-14 RX ADMIN — INSULIN ASPART 2 UNITS: 100 INJECTION, SOLUTION INTRAVENOUS; SUBCUTANEOUS at 01:12

## 2021-12-14 RX ADMIN — PREDNISONE 40 MG: 20 TABLET ORAL at 08:12

## 2021-12-14 RX ADMIN — HYDRALAZINE HYDROCHLORIDE 25 MG: 25 TABLET ORAL at 01:12

## 2021-12-14 RX ADMIN — INSULIN ASPART 13 UNITS: 100 INJECTION, SOLUTION INTRAVENOUS; SUBCUTANEOUS at 05:12

## 2021-12-14 RX ADMIN — ASPIRIN 81 MG CHEWABLE TABLET 81 MG: 81 TABLET CHEWABLE at 08:12

## 2021-12-14 RX ADMIN — INSULIN ASPART 13 UNITS: 100 INJECTION, SOLUTION INTRAVENOUS; SUBCUTANEOUS at 12:12

## 2021-12-14 RX ADMIN — ATORVASTATIN CALCIUM 80 MG: 40 TABLET, FILM COATED ORAL at 08:12

## 2021-12-14 RX ADMIN — FLUTICASONE FUROATE AND VILANTEROL TRIFENATATE 1 PUFF: 100; 25 POWDER RESPIRATORY (INHALATION) at 10:12

## 2021-12-14 RX ADMIN — INSULIN ASPART 4 UNITS: 100 INJECTION, SOLUTION INTRAVENOUS; SUBCUTANEOUS at 05:12

## 2021-12-14 RX ADMIN — FAMOTIDINE 20 MG: 20 TABLET ORAL at 08:12

## 2021-12-15 VITALS
HEART RATE: 114 BPM | RESPIRATION RATE: 20 BRPM | BODY MASS INDEX: 30.68 KG/M2 | TEMPERATURE: 98 F | OXYGEN SATURATION: 99 % | DIASTOLIC BLOOD PRESSURE: 88 MMHG | SYSTOLIC BLOOD PRESSURE: 136 MMHG | WEIGHT: 219.13 LBS | HEIGHT: 71 IN

## 2021-12-15 PROBLEM — I50.33 ACUTE ON CHRONIC DIASTOLIC (CONGESTIVE) HEART FAILURE: Chronic | Status: RESOLVED | Noted: 2021-09-05 | Resolved: 2021-12-15

## 2021-12-15 PROBLEM — N17.9 ACUTE RENAL FAILURE SUPERIMPOSED ON STAGE 3 CHRONIC KIDNEY DISEASE: Status: RESOLVED | Noted: 2020-11-24 | Resolved: 2021-12-15

## 2021-12-15 PROBLEM — J45.901 ACUTE ASTHMA EXACERBATION: Status: RESOLVED | Noted: 2021-11-24 | Resolved: 2021-12-15

## 2021-12-15 PROBLEM — N18.30 ACUTE RENAL FAILURE SUPERIMPOSED ON STAGE 3 CHRONIC KIDNEY DISEASE: Status: RESOLVED | Noted: 2020-11-24 | Resolved: 2021-12-15

## 2021-12-15 LAB
ANION GAP SERPL CALC-SCNC: 13 MMOL/L (ref 8–16)
BUN SERPL-MCNC: 80 MG/DL (ref 8–23)
CALCIUM SERPL-MCNC: 10.2 MG/DL (ref 8.7–10.5)
CHLORIDE SERPL-SCNC: 92 MMOL/L (ref 95–110)
CO2 SERPL-SCNC: 34 MMOL/L (ref 23–29)
CREAT SERPL-MCNC: 2.2 MG/DL (ref 0.5–1.4)
EST. GFR  (AFRICAN AMERICAN): 34 ML/MIN/1.73 M^2
EST. GFR  (NON AFRICAN AMERICAN): 29 ML/MIN/1.73 M^2
GLUCOSE SERPL-MCNC: 222 MG/DL (ref 70–110)
POCT GLUCOSE: 208 MG/DL (ref 70–110)
POCT GLUCOSE: 281 MG/DL (ref 70–110)
POTASSIUM SERPL-SCNC: 3.3 MMOL/L (ref 3.5–5.1)
SODIUM SERPL-SCNC: 139 MMOL/L (ref 136–145)

## 2021-12-15 PROCEDURE — 25000003 PHARM REV CODE 250: Performed by: NURSE PRACTITIONER

## 2021-12-15 PROCEDURE — 94640 AIRWAY INHALATION TREATMENT: CPT

## 2021-12-15 PROCEDURE — 36415 COLL VENOUS BLD VENIPUNCTURE: CPT | Performed by: STUDENT IN AN ORGANIZED HEALTH CARE EDUCATION/TRAINING PROGRAM

## 2021-12-15 PROCEDURE — 63600175 PHARM REV CODE 636 W HCPCS: Performed by: STUDENT IN AN ORGANIZED HEALTH CARE EDUCATION/TRAINING PROGRAM

## 2021-12-15 PROCEDURE — 25000003 PHARM REV CODE 250: Performed by: INTERNAL MEDICINE

## 2021-12-15 PROCEDURE — 80048 BASIC METABOLIC PNL TOTAL CA: CPT | Performed by: STUDENT IN AN ORGANIZED HEALTH CARE EDUCATION/TRAINING PROGRAM

## 2021-12-15 PROCEDURE — 25000003 PHARM REV CODE 250: Performed by: STUDENT IN AN ORGANIZED HEALTH CARE EDUCATION/TRAINING PROGRAM

## 2021-12-15 RX ORDER — ALBUTEROL SULFATE 90 UG/1
2 AEROSOL, METERED RESPIRATORY (INHALATION) EVERY 4 HOURS PRN
Qty: 8.5 G | Refills: 5 | Status: SHIPPED | OUTPATIENT
Start: 2021-12-15 | End: 2023-07-12 | Stop reason: SDUPTHER

## 2021-12-15 RX ORDER — HYDRALAZINE HYDROCHLORIDE 25 MG/1
25 TABLET, FILM COATED ORAL EVERY 8 HOURS
Qty: 90 TABLET | Refills: 5 | Status: ON HOLD | OUTPATIENT
Start: 2021-12-15 | End: 2022-02-10 | Stop reason: SDUPTHER

## 2021-12-15 RX ORDER — FUROSEMIDE 80 MG/1
80 TABLET ORAL 2 TIMES DAILY
Qty: 60 TABLET | Refills: 5 | Status: ON HOLD | OUTPATIENT
Start: 2021-12-15 | End: 2022-02-10 | Stop reason: SDUPTHER

## 2021-12-15 RX ORDER — FUROSEMIDE 40 MG/1
40 TABLET ORAL DAILY
Status: DISCONTINUED | OUTPATIENT
Start: 2021-12-15 | End: 2021-12-15 | Stop reason: HOSPADM

## 2021-12-15 RX ADMIN — ASPIRIN 81 MG CHEWABLE TABLET 81 MG: 81 TABLET CHEWABLE at 09:12

## 2021-12-15 RX ADMIN — APIXABAN 5 MG: 5 TABLET, FILM COATED ORAL at 09:12

## 2021-12-15 RX ADMIN — PREDNISONE 40 MG: 20 TABLET ORAL at 09:12

## 2021-12-15 RX ADMIN — HYDRALAZINE HYDROCHLORIDE 25 MG: 25 TABLET ORAL at 06:12

## 2021-12-15 RX ADMIN — TAMSULOSIN HYDROCHLORIDE 0.4 MG: 0.4 CAPSULE ORAL at 09:12

## 2021-12-15 RX ADMIN — FAMOTIDINE 20 MG: 20 TABLET ORAL at 09:12

## 2021-12-15 RX ADMIN — ATORVASTATIN CALCIUM 80 MG: 40 TABLET, FILM COATED ORAL at 09:12

## 2021-12-15 RX ADMIN — INSULIN ASPART 13 UNITS: 100 INJECTION, SOLUTION INTRAVENOUS; SUBCUTANEOUS at 09:12

## 2021-12-15 RX ADMIN — POTASSIUM BICARBONATE 25 MEQ: 978 TABLET, EFFERVESCENT ORAL at 09:12

## 2021-12-15 RX ADMIN — AMLODIPINE BESYLATE 10 MG: 5 TABLET ORAL at 09:12

## 2021-12-15 RX ADMIN — FUROSEMIDE 40 MG: 40 TABLET ORAL at 09:12

## 2021-12-15 RX ADMIN — FLUTICASONE FUROATE AND VILANTEROL TRIFENATATE 1 PUFF: 100; 25 POWDER RESPIRATORY (INHALATION) at 07:12

## 2021-12-16 ENCOUNTER — PATIENT OUTREACH (OUTPATIENT)
Dept: ADMINISTRATIVE | Facility: CLINIC | Age: 71
End: 2021-12-16
Payer: MEDICARE

## 2022-01-03 NOTE — HPI
69-year-old male with a PMHx of CAD, CHF with EF of 50%,alcohol abuse,CKD 3,, DM, and HTN presents to the ED, per EMS, complaining of intermittent chest pain x 3 days. He describes his pain as pressure. CP radiates to his neck. CP exacerbating with deep respiration. Reports associated SOB with CP. Reports emesis with CP yesterday. Today, pt states he felt light-headed while shopping prior to onset of CP around 2 pm. States he was standing at a bus stop when his pain began. States CP lasted about 1 hour. Reports of chest pain at this time. States EMS gave ASA en route. Pt states he does not take NTG or ASA at home.in ER has Troponin level of 0.063,EKG wit non specific T wave changes,cardiology was called ,was planing taking to cath lab,but he has potassium level of less than 2 and magnesioum of 1.4 cardiology can not take patient to cath lab at this time,electrolytes is replaced IV and PO.he has been also started on IVF for ARF on CKD 3,he is hemodynamically stable and he is on RA,he is already on heparin drip.his last alcohol consumption was yesterday,drink 2 pint of whisky.  
Chato Bowie is a 69-year-old male who presents with complaints of chest discomfort.  Symptoms started yesterday at rest.  No waxing or waning.  Persistent throughout the night into today.  Remote history of coronary artery disease.  Has been seen by Dr. Steen in the hospital the patient has not followed up in clinic.  EKG shows sinus rhythm with interventricular conduction delay.  Lateral T-wave abnormality that appears slightly worse than previous.  His electrolytes were friendly abnormal.  Hypokalemia.  Hypomagnesemia.  His creatinine is 2.5.  Troponin of 0.063.  With regards to his chest discomfort he has received nitroglycerin without relief.  Last ejection fraction showed an EF of 50%.  Left ventricular hypertrophy with grade 2 diastolic dysfunction.  Moderate aortic stenosis.  Moderate pulmonary hypertension.  BNP normal.  Blood pressure not elevated.  Creatinine from March 1.6.  Denies tobacco or alcohol.    Echocardiogram 03/21/2020:    · Low normal left ventricular systolic function. The estimated ejection fraction is 50%.  · Concentric left ventricular hypertrophy.  · Grade II (moderate) left ventricular diastolic dysfunction consistent with pseudonormalization.  · Normal right ventricular systolic function.  · Moderate left atrial enlargement.  · Moderate aortic valve stenosis.  · Aortic valve area is 1.07 cm2; peak velocity is 3.05 m/s; mean gradient is 24 mmHg.  · Mild aortic regurgitation.  · Mild mitral regurgitation.  · The estimated PA systolic pressure is 47 mmHg.  · Pulmonary hypertension present.    
No

## 2022-02-05 ENCOUNTER — HOSPITAL ENCOUNTER (INPATIENT)
Facility: HOSPITAL | Age: 72
LOS: 5 days | Discharge: HOME HEALTH | DRG: 291 | End: 2022-02-10
Attending: EMERGENCY MEDICINE | Admitting: STUDENT IN AN ORGANIZED HEALTH CARE EDUCATION/TRAINING PROGRAM
Payer: MEDICARE

## 2022-02-05 DIAGNOSIS — I50.9 CHF EXACERBATION: ICD-10-CM

## 2022-02-05 DIAGNOSIS — R55 SYNCOPE, UNSPECIFIED SYNCOPE TYPE: ICD-10-CM

## 2022-02-05 DIAGNOSIS — R60.9 EDEMA: ICD-10-CM

## 2022-02-05 DIAGNOSIS — R06.02 SOB (SHORTNESS OF BREATH): ICD-10-CM

## 2022-02-05 DIAGNOSIS — U07.1 COVID-19: ICD-10-CM

## 2022-02-05 DIAGNOSIS — N18.4 CKD (CHRONIC KIDNEY DISEASE), STAGE IV: ICD-10-CM

## 2022-02-05 DIAGNOSIS — I50.33 ACUTE ON CHRONIC DIASTOLIC (CONGESTIVE) HEART FAILURE: Primary | Chronic | ICD-10-CM

## 2022-02-05 DIAGNOSIS — R60.1 ANASARCA: ICD-10-CM

## 2022-02-05 LAB
ALBUMIN SERPL BCP-MCNC: 4 G/DL (ref 3.5–5.2)
ALLENS TEST: ABNORMAL
ALP SERPL-CCNC: 78 U/L (ref 55–135)
ALT SERPL W/O P-5'-P-CCNC: 8 U/L (ref 10–44)
ANION GAP SERPL CALC-SCNC: 14 MMOL/L (ref 8–16)
AST SERPL-CCNC: 17 U/L (ref 10–40)
BASOPHILS # BLD AUTO: 0.05 K/UL (ref 0–0.2)
BASOPHILS NFR BLD: 0.6 % (ref 0–1.9)
BILIRUB SERPL-MCNC: 0.6 MG/DL (ref 0.1–1)
BNP SERPL-MCNC: 659 PG/ML (ref 0–99)
BUN SERPL-MCNC: 35 MG/DL (ref 8–23)
CALCIUM SERPL-MCNC: 9.6 MG/DL (ref 8.7–10.5)
CHLORIDE SERPL-SCNC: 104 MMOL/L (ref 95–110)
CO2 SERPL-SCNC: 24 MMOL/L (ref 23–29)
CREAT SERPL-MCNC: 1.8 MG/DL (ref 0.5–1.4)
CRP SERPL-MCNC: 13.7 MG/L (ref 0–8.2)
CTP QC/QA: YES
DELSYS: ABNORMAL
DIFFERENTIAL METHOD: ABNORMAL
EOSINOPHIL # BLD AUTO: 0.2 K/UL (ref 0–0.5)
EOSINOPHIL NFR BLD: 2.7 % (ref 0–8)
EP: 5
ERYTHROCYTE [DISTWIDTH] IN BLOOD BY AUTOMATED COUNT: 13.4 % (ref 11.5–14.5)
ERYTHROCYTE [SEDIMENTATION RATE] IN BLOOD BY WESTERGREN METHOD: 14 MM/H
EST. GFR  (AFRICAN AMERICAN): 43 ML/MIN/1.73 M^2
EST. GFR  (NON AFRICAN AMERICAN): 37 ML/MIN/1.73 M^2
FERRITIN SERPL-MCNC: 293 NG/ML (ref 20–300)
FIO2: 100
GLUCOSE SERPL-MCNC: 110 MG/DL (ref 70–110)
HCO3 UR-SCNC: 27.4 MMOL/L (ref 24–28)
HCT VFR BLD AUTO: 37.3 % (ref 40–54)
HGB BLD-MCNC: 12.2 G/DL (ref 14–18)
IMM GRANULOCYTES # BLD AUTO: 0.04 K/UL (ref 0–0.04)
IMM GRANULOCYTES NFR BLD AUTO: 0.5 % (ref 0–0.5)
IP: 10
LACTATE SERPL-SCNC: 0.9 MMOL/L (ref 0.5–2.2)
LACTATE SERPL-SCNC: 1.4 MMOL/L (ref 0.5–2.2)
LYMPHOCYTES # BLD AUTO: 1.3 K/UL (ref 1–4.8)
LYMPHOCYTES NFR BLD: 14.4 % (ref 18–48)
MAGNESIUM SERPL-MCNC: 2 MG/DL (ref 1.6–2.6)
MCH RBC QN AUTO: 26.7 PG (ref 27–31)
MCHC RBC AUTO-ENTMCNC: 32.7 G/DL (ref 32–36)
MCV RBC AUTO: 82 FL (ref 82–98)
MODE: ABNORMAL
MONOCYTES # BLD AUTO: 1.1 K/UL (ref 0.3–1)
MONOCYTES NFR BLD: 12.1 % (ref 4–15)
NEUTROPHILS # BLD AUTO: 6.2 K/UL (ref 1.8–7.7)
NEUTROPHILS NFR BLD: 69.7 % (ref 38–73)
NRBC BLD-RTO: 0 /100 WBC
PCO2 BLDA: 44.8 MMHG (ref 35–45)
PH SMN: 7.39 [PH] (ref 7.35–7.45)
PHOSPHATE SERPL-MCNC: 3.4 MG/DL (ref 2.7–4.5)
PLATELET # BLD AUTO: 266 K/UL (ref 150–450)
PMV BLD AUTO: 8.9 FL (ref 9.2–12.9)
PO2 BLDA: 71 MMHG (ref 40–60)
POC BE: 2 MMOL/L
POC SATURATED O2: 94 % (ref 95–100)
POC TCO2: 29 MMOL/L (ref 24–29)
POCT GLUCOSE: 106 MG/DL (ref 70–110)
POCT GLUCOSE: 120 MG/DL (ref 70–110)
POCT GLUCOSE: 132 MG/DL (ref 70–110)
POCT GLUCOSE: 244 MG/DL (ref 70–110)
POTASSIUM SERPL-SCNC: 2.9 MMOL/L (ref 3.5–5.1)
POTASSIUM SERPL-SCNC: 3.2 MMOL/L (ref 3.5–5.1)
PROCALCITONIN SERPL IA-MCNC: 0.05 NG/ML
PROCALCITONIN SERPL IA-MCNC: 0.07 NG/ML
PROT SERPL-MCNC: 7.4 G/DL (ref 6–8.4)
RBC # BLD AUTO: 4.57 M/UL (ref 4.6–6.2)
SAMPLE: ABNORMAL
SARS-COV-2 RDRP RESP QL NAA+PROBE: NEGATIVE
SITE: ABNORMAL
SODIUM SERPL-SCNC: 142 MMOL/L (ref 136–145)
TROPONIN I SERPL DL<=0.01 NG/ML-MCNC: 0.02 NG/ML (ref 0–0.03)
WBC # BLD AUTO: 8.86 K/UL (ref 3.9–12.7)

## 2022-02-05 PROCEDURE — 94640 AIRWAY INHALATION TREATMENT: CPT

## 2022-02-05 PROCEDURE — 84132 ASSAY OF SERUM POTASSIUM: CPT | Performed by: NURSE PRACTITIONER

## 2022-02-05 PROCEDURE — 25000003 PHARM REV CODE 250: Performed by: NURSE PRACTITIONER

## 2022-02-05 PROCEDURE — 27000190 HC CPAP FULL FACE MASK W/VALVE

## 2022-02-05 PROCEDURE — 84145 PROCALCITONIN (PCT): CPT | Performed by: EMERGENCY MEDICINE

## 2022-02-05 PROCEDURE — 94660 CPAP INITIATION&MGMT: CPT

## 2022-02-05 PROCEDURE — 63600175 PHARM REV CODE 636 W HCPCS: Performed by: EMERGENCY MEDICINE

## 2022-02-05 PROCEDURE — U0003 INFECTIOUS AGENT DETECTION BY NUCLEIC ACID (DNA OR RNA); SEVERE ACUTE RESPIRATORY SYNDROME CORONAVIRUS 2 (SARS-COV-2) (CORONAVIRUS DISEASE [COVID-19]), AMPLIFIED PROBE TECHNIQUE, MAKING USE OF HIGH THROUGHPUT TECHNOLOGIES AS DESCRIBED BY CMS-2020-01-R: HCPCS | Performed by: NURSE PRACTITIONER

## 2022-02-05 PROCEDURE — 99900035 HC TECH TIME PER 15 MIN (STAT)

## 2022-02-05 PROCEDURE — 63600175 PHARM REV CODE 636 W HCPCS: Performed by: NURSE PRACTITIONER

## 2022-02-05 PROCEDURE — 82728 ASSAY OF FERRITIN: CPT | Performed by: NURSE PRACTITIONER

## 2022-02-05 PROCEDURE — 93010 ELECTROCARDIOGRAM REPORT: CPT | Mod: ,,, | Performed by: INTERNAL MEDICINE

## 2022-02-05 PROCEDURE — 84484 ASSAY OF TROPONIN QUANT: CPT | Performed by: EMERGENCY MEDICINE

## 2022-02-05 PROCEDURE — U0005 INFEC AGEN DETEC AMPLI PROBE: HCPCS | Performed by: NURSE PRACTITIONER

## 2022-02-05 PROCEDURE — 83735 ASSAY OF MAGNESIUM: CPT | Performed by: EMERGENCY MEDICINE

## 2022-02-05 PROCEDURE — U0002 COVID-19 LAB TEST NON-CDC: HCPCS | Performed by: EMERGENCY MEDICINE

## 2022-02-05 PROCEDURE — 84100 ASSAY OF PHOSPHORUS: CPT | Performed by: EMERGENCY MEDICINE

## 2022-02-05 PROCEDURE — 80053 COMPREHEN METABOLIC PANEL: CPT | Performed by: EMERGENCY MEDICINE

## 2022-02-05 PROCEDURE — 96365 THER/PROPH/DIAG IV INF INIT: CPT

## 2022-02-05 PROCEDURE — 21400001 HC TELEMETRY ROOM

## 2022-02-05 PROCEDURE — 25000003 PHARM REV CODE 250: Performed by: EMERGENCY MEDICINE

## 2022-02-05 PROCEDURE — 82803 BLOOD GASES ANY COMBINATION: CPT

## 2022-02-05 PROCEDURE — 84145 PROCALCITONIN (PCT): CPT | Mod: 91 | Performed by: NURSE PRACTITIONER

## 2022-02-05 PROCEDURE — 94761 N-INVAS EAR/PLS OXIMETRY MLT: CPT

## 2022-02-05 PROCEDURE — 93010 EKG 12-LEAD: ICD-10-PCS | Mod: ,,, | Performed by: INTERNAL MEDICINE

## 2022-02-05 PROCEDURE — 93005 ELECTROCARDIOGRAM TRACING: CPT

## 2022-02-05 PROCEDURE — 83605 ASSAY OF LACTIC ACID: CPT | Performed by: EMERGENCY MEDICINE

## 2022-02-05 PROCEDURE — 99291 CRITICAL CARE FIRST HOUR: CPT | Mod: 25

## 2022-02-05 PROCEDURE — 85025 COMPLETE CBC W/AUTO DIFF WBC: CPT | Performed by: EMERGENCY MEDICINE

## 2022-02-05 PROCEDURE — 96375 TX/PRO/DX INJ NEW DRUG ADDON: CPT

## 2022-02-05 PROCEDURE — 27000207 HC ISOLATION

## 2022-02-05 PROCEDURE — 63700000 PHARM REV CODE 250 ALT 637 W/O HCPCS: Performed by: NURSE PRACTITIONER

## 2022-02-05 PROCEDURE — 86140 C-REACTIVE PROTEIN: CPT | Performed by: EMERGENCY MEDICINE

## 2022-02-05 PROCEDURE — 83605 ASSAY OF LACTIC ACID: CPT | Mod: 91 | Performed by: NURSE PRACTITIONER

## 2022-02-05 PROCEDURE — 25000242 PHARM REV CODE 250 ALT 637 W/ HCPCS: Performed by: EMERGENCY MEDICINE

## 2022-02-05 PROCEDURE — 83880 ASSAY OF NATRIURETIC PEPTIDE: CPT | Performed by: EMERGENCY MEDICINE

## 2022-02-05 RX ORDER — METOPROLOL SUCCINATE 50 MG/1
50 TABLET, EXTENDED RELEASE ORAL DAILY
Status: DISCONTINUED | OUTPATIENT
Start: 2022-02-05 | End: 2022-02-10 | Stop reason: HOSPADM

## 2022-02-05 RX ORDER — GLUCAGON 1 MG
1 KIT INJECTION
Status: DISCONTINUED | OUTPATIENT
Start: 2022-02-05 | End: 2022-02-10 | Stop reason: HOSPADM

## 2022-02-05 RX ORDER — IBUPROFEN 200 MG
24 TABLET ORAL
Status: DISCONTINUED | OUTPATIENT
Start: 2022-02-05 | End: 2022-02-10 | Stop reason: HOSPADM

## 2022-02-05 RX ORDER — AZITHROMYCIN 250 MG/1
250 TABLET, FILM COATED ORAL DAILY
Status: COMPLETED | OUTPATIENT
Start: 2022-02-06 | End: 2022-02-09

## 2022-02-05 RX ORDER — SODIUM CHLORIDE 0.9 % (FLUSH) 0.9 %
10 SYRINGE (ML) INJECTION
Status: DISCONTINUED | OUTPATIENT
Start: 2022-02-05 | End: 2022-02-10 | Stop reason: HOSPADM

## 2022-02-05 RX ORDER — ATORVASTATIN CALCIUM 10 MG/1
20 TABLET, FILM COATED ORAL NIGHTLY
Status: DISCONTINUED | OUTPATIENT
Start: 2022-02-05 | End: 2022-02-10 | Stop reason: HOSPADM

## 2022-02-05 RX ORDER — LEVOFLOXACIN 500 MG/1
500 TABLET, FILM COATED ORAL DAILY
Status: DISCONTINUED | OUTPATIENT
Start: 2022-02-05 | End: 2022-02-05

## 2022-02-05 RX ORDER — POTASSIUM CHLORIDE 7.45 MG/ML
10 INJECTION INTRAVENOUS
Status: COMPLETED | OUTPATIENT
Start: 2022-02-05 | End: 2022-02-05

## 2022-02-05 RX ORDER — FAMOTIDINE 20 MG/1
20 TABLET, FILM COATED ORAL DAILY
Status: DISCONTINUED | OUTPATIENT
Start: 2022-02-05 | End: 2022-02-10 | Stop reason: HOSPADM

## 2022-02-05 RX ORDER — FAMOTIDINE 20 MG/1
40 TABLET, FILM COATED ORAL DAILY
Status: DISCONTINUED | OUTPATIENT
Start: 2022-02-05 | End: 2022-02-05 | Stop reason: DRUGHIGH

## 2022-02-05 RX ORDER — FUROSEMIDE 10 MG/ML
40 INJECTION INTRAMUSCULAR; INTRAVENOUS ONCE
Status: DISCONTINUED | OUTPATIENT
Start: 2022-02-05 | End: 2022-02-05

## 2022-02-05 RX ORDER — HYDRALAZINE HYDROCHLORIDE 25 MG/1
25 TABLET, FILM COATED ORAL EVERY 8 HOURS
Status: DISCONTINUED | OUTPATIENT
Start: 2022-02-05 | End: 2022-02-09

## 2022-02-05 RX ORDER — FUROSEMIDE 10 MG/ML
40 INJECTION INTRAMUSCULAR; INTRAVENOUS 2 TIMES DAILY WITH MEALS
Status: DISCONTINUED | OUTPATIENT
Start: 2022-02-05 | End: 2022-02-05

## 2022-02-05 RX ORDER — IBUPROFEN 200 MG
16 TABLET ORAL
Status: DISCONTINUED | OUTPATIENT
Start: 2022-02-05 | End: 2022-02-10 | Stop reason: HOSPADM

## 2022-02-05 RX ORDER — LANOLIN ALCOHOL/MO/W.PET/CERES
800 CREAM (GRAM) TOPICAL DAILY
Status: DISCONTINUED | OUTPATIENT
Start: 2022-02-05 | End: 2022-02-10 | Stop reason: HOSPADM

## 2022-02-05 RX ORDER — ALBUTEROL SULFATE 2.5 MG/.5ML
5 SOLUTION RESPIRATORY (INHALATION)
Status: COMPLETED | OUTPATIENT
Start: 2022-02-05 | End: 2022-02-05

## 2022-02-05 RX ORDER — FUROSEMIDE 10 MG/ML
80 INJECTION INTRAMUSCULAR; INTRAVENOUS
Status: COMPLETED | OUTPATIENT
Start: 2022-02-05 | End: 2022-02-05

## 2022-02-05 RX ORDER — ONDANSETRON 2 MG/ML
4 INJECTION INTRAMUSCULAR; INTRAVENOUS EVERY 6 HOURS PRN
Status: DISCONTINUED | OUTPATIENT
Start: 2022-02-05 | End: 2022-02-10 | Stop reason: HOSPADM

## 2022-02-05 RX ORDER — IPRATROPIUM BROMIDE AND ALBUTEROL SULFATE 2.5; .5 MG/3ML; MG/3ML
3 SOLUTION RESPIRATORY (INHALATION)
Status: DISCONTINUED | OUTPATIENT
Start: 2022-02-05 | End: 2022-02-05

## 2022-02-05 RX ORDER — INSULIN ASPART 100 [IU]/ML
0-5 INJECTION, SOLUTION INTRAVENOUS; SUBCUTANEOUS
Status: DISCONTINUED | OUTPATIENT
Start: 2022-02-05 | End: 2022-02-10 | Stop reason: HOSPADM

## 2022-02-05 RX ORDER — AMIODARONE HYDROCHLORIDE 200 MG/1
200 TABLET ORAL DAILY
Status: DISCONTINUED | OUTPATIENT
Start: 2022-02-05 | End: 2022-02-10 | Stop reason: HOSPADM

## 2022-02-05 RX ORDER — TAMSULOSIN HYDROCHLORIDE 0.4 MG/1
0.4 CAPSULE ORAL DAILY
Status: DISCONTINUED | OUTPATIENT
Start: 2022-02-05 | End: 2022-02-10 | Stop reason: HOSPADM

## 2022-02-05 RX ORDER — AZITHROMYCIN 250 MG/1
500 TABLET, FILM COATED ORAL ONCE
Status: COMPLETED | OUTPATIENT
Start: 2022-02-05 | End: 2022-02-05

## 2022-02-05 RX ORDER — ACETAMINOPHEN 325 MG/1
650 TABLET ORAL EVERY 6 HOURS PRN
Status: DISCONTINUED | OUTPATIENT
Start: 2022-02-05 | End: 2022-02-10 | Stop reason: HOSPADM

## 2022-02-05 RX ORDER — NAPROXEN SODIUM 220 MG/1
81 TABLET, FILM COATED ORAL DAILY
Status: DISCONTINUED | OUTPATIENT
Start: 2022-02-05 | End: 2022-02-10 | Stop reason: HOSPADM

## 2022-02-05 RX ORDER — IPRATROPIUM BROMIDE 0.5 MG/2.5ML
1 SOLUTION RESPIRATORY (INHALATION) ONCE
Status: COMPLETED | OUTPATIENT
Start: 2022-02-05 | End: 2022-02-05

## 2022-02-05 RX ORDER — FUROSEMIDE 10 MG/ML
60 INJECTION INTRAMUSCULAR; INTRAVENOUS 2 TIMES DAILY WITH MEALS
Status: COMPLETED | OUTPATIENT
Start: 2022-02-05 | End: 2022-02-06

## 2022-02-05 RX ORDER — AMLODIPINE BESYLATE 5 MG/1
10 TABLET ORAL DAILY
Status: DISCONTINUED | OUTPATIENT
Start: 2022-02-05 | End: 2022-02-07

## 2022-02-05 RX ORDER — POTASSIUM CHLORIDE 20 MEQ/1
40 TABLET, EXTENDED RELEASE ORAL DAILY
Status: DISCONTINUED | OUTPATIENT
Start: 2022-02-05 | End: 2022-02-10 | Stop reason: HOSPADM

## 2022-02-05 RX ORDER — ALBUTEROL SULFATE 2.5 MG/.5ML
2.5 SOLUTION RESPIRATORY (INHALATION) EVERY 4 HOURS PRN
Status: DISCONTINUED | OUTPATIENT
Start: 2022-02-05 | End: 2022-02-10 | Stop reason: HOSPADM

## 2022-02-05 RX ADMIN — ASPIRIN 81 MG CHEWABLE TABLET 81 MG: 81 TABLET CHEWABLE at 09:02

## 2022-02-05 RX ADMIN — HYDRALAZINE HYDROCHLORIDE 25 MG: 25 TABLET, FILM COATED ORAL at 09:02

## 2022-02-05 RX ADMIN — TAMSULOSIN HYDROCHLORIDE 0.4 MG: 0.4 CAPSULE ORAL at 09:02

## 2022-02-05 RX ADMIN — Medication 800 MG: at 09:02

## 2022-02-05 RX ADMIN — ALBUTEROL SULFATE 5 MG: 2.5 SOLUTION RESPIRATORY (INHALATION) at 04:02

## 2022-02-05 RX ADMIN — LEVOFLOXACIN 500 MG: 500 TABLET, FILM COATED ORAL at 01:02

## 2022-02-05 RX ADMIN — FAMOTIDINE 20 MG: 20 TABLET, FILM COATED ORAL at 09:02

## 2022-02-05 RX ADMIN — POTASSIUM BICARBONATE 25 MEQ: 977.5 TABLET, EFFERVESCENT ORAL at 04:02

## 2022-02-05 RX ADMIN — HYDRALAZINE HYDROCHLORIDE 25 MG: 25 TABLET, FILM COATED ORAL at 01:02

## 2022-02-05 RX ADMIN — POTASSIUM CHLORIDE 10 MEQ: 7.46 INJECTION, SOLUTION INTRAVENOUS at 04:02

## 2022-02-05 RX ADMIN — METOPROLOL SUCCINATE 50 MG: 50 TABLET, EXTENDED RELEASE ORAL at 09:02

## 2022-02-05 RX ADMIN — POTASSIUM CHLORIDE 40 MEQ: 20 TABLET, EXTENDED RELEASE ORAL at 09:02

## 2022-02-05 RX ADMIN — APIXABAN 5 MG: 5 TABLET, FILM COATED ORAL at 09:02

## 2022-02-05 RX ADMIN — HYDRALAZINE HYDROCHLORIDE 25 MG: 25 TABLET, FILM COATED ORAL at 05:02

## 2022-02-05 RX ADMIN — METHYLPREDNISOLONE SODIUM SUCCINATE 60 MG: 40 INJECTION, POWDER, FOR SOLUTION INTRAMUSCULAR; INTRAVENOUS at 01:02

## 2022-02-05 RX ADMIN — INSULIN ASPART 1 UNITS: 100 INJECTION, SOLUTION INTRAVENOUS; SUBCUTANEOUS at 09:02

## 2022-02-05 RX ADMIN — FUROSEMIDE 80 MG: 10 INJECTION, SOLUTION INTRAMUSCULAR; INTRAVENOUS at 04:02

## 2022-02-05 RX ADMIN — IPRATROPIUM BROMIDE 1 MG: 0.5 SOLUTION RESPIRATORY (INHALATION) at 04:02

## 2022-02-05 RX ADMIN — FUROSEMIDE 60 MG: 10 INJECTION, SOLUTION INTRAMUSCULAR; INTRAVENOUS at 05:02

## 2022-02-05 RX ADMIN — ATORVASTATIN CALCIUM 20 MG: 10 TABLET, FILM COATED ORAL at 09:02

## 2022-02-05 RX ADMIN — AMIODARONE HYDROCHLORIDE 200 MG: 200 TABLET ORAL at 09:02

## 2022-02-05 RX ADMIN — AZITHROMYCIN MONOHYDRATE 500 MG: 250 TABLET ORAL at 03:02

## 2022-02-05 RX ADMIN — CEFTRIAXONE 1 G: 1 INJECTION, SOLUTION INTRAVENOUS at 03:02

## 2022-02-05 NOTE — H&P
"[facility]  History & Physical    Patient Name: Chato Bowie  MRN: [id number]  Patient Class: OP- Observation  Admission Date: 2/5/2022  Attending Physician: Sea Phelan MD   Primary Care Provider: [facility]         Patient information was obtained from patient, past medical records and ER records.     Subjective:     Principal Problem:Congestive heart failure    Chief Complaint:   Chief Complaint   Patient presents with    Shortness of Breath     Pt SOB x2hrs. Home inhaler hasn't helped. + wheezing and labored breathing. Hx of CHF. Pt placed on CPAP with EMS. +BLE swelling. EMS gave ASA 324mg and x2 Nitro SL. Pt also with complaints of midline CP. Per EMS report pt initial sats were 92% on RA.     Chest Pain        HPI: 71 year old male present to ED with c/o persistent shortness of breath onset 1 day ago. Patient also notes symptoms of leg swelling (onset "earlier today"), chills, chest pain, cough, chest tightness, and abdominal distension. Patient reports that he ran out of his prescribed Lasix 1 day ago, but otherwise endorses medication compliance. Patient has attempted Tx with Albuterol inhaler, but notes no alleviation. Patient also states that his chest pain is exacerbated by coughing. PMHx CHF, Asthma, CAD, DM, HTN, HLD.                   Past Medical History:   Diagnosis Date    Acute congestive heart failure 3/20/2020    Alcohol abuse     Arthritis     Asthma attack 11/24/2021    Coronary artery disease     Diabetes mellitus     Hyperlipemia     Hypertension     Rhabdomyolysis        Past Surgical History:   Procedure Laterality Date    EYE SURGERY      TREATMENT OF CARDIAC ARRHYTHMIA N/A 12/7/2020    Procedure: Cardioversion or Defibrillation;  Surgeon: Indra Foote MD;  Location: [facility] CATH LAB;  Service: Cardiology;  Laterality: N/A;    TREATMENT OF CARDIAC ARRHYTHMIA N/A 12/30/2020    Procedure: Cardioversion or Defibrillation;  Surgeon: " Tyrone Steen MD;  Location: [facility] CATH LAB;  Service: Cardiology;  Laterality: N/A;    VASCULAR SURGERY         Review of patient's allergies indicates:  No Known Allergies    No current facility-administered medications on file prior to encounter.     Current Outpatient Medications on File Prior to Encounter   Medication Sig    albuterol (PROVENTIL/VENTOLIN HFA) 90 mcg/actuation inhaler Inhale 2 puffs into the lungs every 4 (four) hours as needed.    amiodarone (PACERONE) 200 MG Tab Take 200 mg by mouth once daily.    amLODIPine (NORVASC) 10 MG tablet Take 10 mg by mouth once daily.    apixaban (ELIQUIS) 5 mg Tab Take 5 mg by mouth 2 (two) times daily.    aspirin 81 MG Chew Take 1 tablet (81 mg total) by mouth once daily.    DUREZOL 0.05 % Drop ophthalmic solution INT 1 GTT INTO OD FID FOR 3 WEEKS    famotidine (PEPCID) 40 MG tablet Take 40 mg by mouth once daily.    fluticasone-salmeterol diskus inhaler 100-50 mcg     furosemide (LASIX) 80 MG tablet Take 1 tablet (80 mg total) by mouth 2 (two) times a day.    hydrALAZINE (APRESOLINE) 25 MG tablet Take 1 tablet (25 mg total) by mouth every 8 (eight) hours.    insulin aspart U-100 (NOVOLOG) 100 unit/mL (3 mL) InPn pen Inject 5 Units into the skin 3 (three) times daily.    losartan-hydrochlorothiazide 100-25 mg (HYZAAR) 100-25 mg per tablet Take 1 tablet by mouth once daily.    magnesium oxide (MAG-OX) 400 mg (241.3 mg magnesium) tablet Take 800 mg by mouth.    metOLazone (ZAROXOLYN) 5 MG tablet TAKE 1 TABLET BY MOUTH 3 TIMES A WEEK AS NEEDED FOR SEVERE LEG SWELLING    metoprolol succinate (TOPROL-XL) 50 MG 24 hr tablet Take 50 mg by mouth once daily.    multivitamin Tab Take 1 tablet by mouth once daily.    NOVOLIN 70/30 U-100 INSULIN 100 unit/mL (70-30) injection     omeprazole (PRILOSEC) 20 MG capsule Take 20 mg by mouth once daily.    potassium chloride SA (K-DUR,KLOR-CON) 20 MEQ tablet Take 2 tablets by mouth once daily.    rosuvastatin  (CRESTOR) 20 MG tablet Take 20 mg by mouth once daily.    spironolactone (ALDACTONE) 25 MG tablet     tamsulosin (FLOMAX) 0.4 mg Cap Take 1 capsule (0.4 mg total) by mouth once daily.     Family History     Problem Relation (Age of Onset)    Diabetes Mother, Father, Sister    Hypertension Mother, Father, Sister        Tobacco Use    Smoking status: Never Smoker    Smokeless tobacco: Never Used   Substance and Sexual Activity    Alcohol use: Not Currently     Alcohol/week: 4.0 standard drinks     Types: 4 Cans of beer per week    Drug use: No    Sexual activity: Yes     Partners: Female     Review of Systems   Constitutional: Positive for chills. Negative for fever.   HENT: Negative.  Negative for congestion, rhinorrhea and sore throat.    Eyes: Negative.  Negative for visual disturbance.   Respiratory: Positive for cough, chest tightness and shortness of breath.    Cardiovascular: Positive for chest pain and leg swelling.   Gastrointestinal: Positive for abdominal distention. Negative for abdominal pain, diarrhea, nausea and vomiting.   Genitourinary: Negative.  Negative for dysuria, frequency and hematuria.   Musculoskeletal: Negative.  Negative for back pain.   Skin: Negative.  Negative for rash.   Neurological: Negative.  Negative for dizziness, weakness and headaches.   Objective:     Vital Signs (Most Recent):  Temp: 98.5 °F (36.9 °C) (02/05/22 0409)  Pulse: 107 (02/05/22 0432)  Resp: (!) 22 (02/05/22 0432)  BP: (!) 158/87 (02/05/22 0432)  SpO2: 100 % (02/05/22 0432) Vital Signs (24h Range):  Temp:  [98.5 °F (36.9 °C)] 98.5 °F (36.9 °C)  Pulse:  [106-108] 107  Resp:  [16-30] 22  SpO2:  [97 %-100 %] 100 %  BP: (150-167)/(82-87) 158/87     Weight: 104.3 kg (230 lb)  Body mass index is 32.08 kg/m².    Physical Exam  Nursing note and vitals reviewed.  Constitutional: He is not diaphoretic. He appears distressed (moderately).   HENT:   Head: Normocephalic and atraumatic.   Nose: Nose normal.   Mouth/Throat:  No oropharyngeal exudate.   Eyes: EOM are normal. Pupils are equal, round, and reactive to light.   Neck: Neck supple. No tracheal deviation present. JVD present.   Normal range of motion.  Cardiovascular: Regular rhythm, normal heart sounds and intact distal pulses.   Tachycardic   Pulmonary/Chest: He is in respiratory distress (Mild tachypnea, diffuse wheezing). He has wheezes.   Abdominal: Abdomen is soft. Bowel sounds are normal. He exhibits no distension. There is no abdominal tenderness. There is no rebound and no guarding.   Musculoskeletal:         General: Edema ( 2+ pitting edema bilateral lower extremity) present. No tenderness. Normal range of motion.      Cervical back: Normal range of motion and neck supple.     Neurological: He is alert and oriented to person, place, and time. He has normal strength. No cranial nerve deficit.   Skin: Skin is warm and dry. Capillary refill takes less than 2 seconds. No rash noted. No erythema.      Significant Labs: All pertinent labs within the past 24 hours have been reviewed.    Significant Imaging: I have reviewed all pertinent imaging results/findings within the past 24 hours.    Assessment/Plan:     * Congestive heart failure  IV diuresis  , troponin 0.017  Strict I&Os  Daily weight  Fluid restrict 1.5L   As needed respiratory tx   continuous tele monitoring   TTE 9/07/21  · The left ventricle is normal in size with concentric hypertrophy and normal systolic function.  · The estimated ejection fraction is 60%.  · Grade III left ventricular diastolic dysfunction.  · Mild right ventricular enlargement with normal right ventricular systolic function.  · Severe left atrial enlargement.  · Mild right atrial enlargement.  · There is mild-to-moderate aortic valve stenosis.  · Aortic valve area is 1.28 cm2; peak velocity is 2.81 m/s; mean gradient is 20 mmHg.  · Mild aortic regurgitation.  · Mild mitral regurgitation.  · Moderate tricuspid regurgitation.  · Normal  central venous pressure (3 mmHg).  · The estimated PA systolic pressure is 53 mmHg.  · There is pulmonary hypertension.  CXR IMPRESSION: Mild diminished depth of inspiration noted.  The cardiomediastinal silhouette appears enlarged, stable appearance when compared to the prior exam.There is central pulmonary vascular prominence.  There is bilateral pattern of patchy pulmonary infiltrates.  This is more prominent about the perihilar region and lung bases. There is no evidence for pleural effusion and there is no evidence for pneumothorax.  The osseous structures demonstrate chronic change.          Paroxysmal atrial flutter  Resume home eliquis   continuous tele monitoring   ekg as needed    Hypokalemia  Replete as needed  monitor    Type 2 diabetes mellitus with kidney complication, with long-term current use of insulin  Accuchecks AcHs with ss insulin  Held home meds     Hyperlipidemia  Resume home meds    Essential hypertension  Resume home meds  Held losartan due to elevated creatinine    Acute renal failure  Trend level 1.8  (recently patient was high as 3.9)   Avoid any nephrotoxin medication  Strict I and Os   Daily wgt    CAD (coronary artery disease)  Resume home meds      VTE Risk Mitigation (From admission, onward)         Ordered     apixaban tablet 5 mg  2 times daily         02/05/22 0538               As clarification, on2/5/22 @ 0511, patient should be placed in hospital observation services under my care in collaboration with MD Raimundo Gunderson NP  Department of Hospital Medicine   [facility] - Emergency Dept

## 2022-02-05 NOTE — ASSESSMENT & PLAN NOTE
IV diuresis  , troponin 0.017  Strict I&Os  Daily weight  Fluid restrict 1.5L   As needed respiratory tx   continuous tele monitoring   TTE 9/07/21  · The left ventricle is normal in size with concentric hypertrophy and normal systolic function.  · The estimated ejection fraction is 60%.  · Grade III left ventricular diastolic dysfunction.  · Mild right ventricular enlargement with normal right ventricular systolic function.  · Severe left atrial enlargement.  · Mild right atrial enlargement.  · There is mild-to-moderate aortic valve stenosis.  · Aortic valve area is 1.28 cm2; peak velocity is 2.81 m/s; mean gradient is 20 mmHg.  · Mild aortic regurgitation.  · Mild mitral regurgitation.  · Moderate tricuspid regurgitation.  · Normal central venous pressure (3 mmHg).  · The estimated PA systolic pressure is 53 mmHg.  · There is pulmonary hypertension.  CXR IMPRESSION: Mild diminished depth of inspiration noted.  The cardiomediastinal silhouette appears enlarged, stable appearance when compared to the prior exam.There is central pulmonary vascular prominence.  There is bilateral pattern of patchy pulmonary infiltrates.  This is more prominent about the perihilar region and lung bases. There is no evidence for pleural effusion and there is no evidence for pneumothorax.  The osseous structures demonstrate chronic change.

## 2022-02-05 NOTE — HOSPITAL COURSE
"71 year old AAM with Diastolic heart failure (preserved LVEF 55% [11/21]), Aortic stenosis, and pulmonary HTN, who reports acute onset 2 day history of MACK and LE swelling. Admitted on 02/05/2022 for Acute on chronic diastolic congestive heart failure  IV lasix in ED with minimal improvement. Covid rapid negative and BNP consistent with prior levels. He had CXR finding which shows, "bilateral pattern of patchy pulmonary infiltrates.  This is more prominent about the perihilar region and lung bases".  Procalcitonin normal. Cardiology consulted and recommends continuing diuresis. Nephrolgy consulted for CKD and recommended aggressive diuresis. Patient was started on Rocephin/Azithromycin for CAP coverage on admission. Home norvasc discontinued given pedal edema and anasraca. US of bilateral lower extremities with no evidence of lower extremity deep venous thrombosis.      Patient dyspnea and swelling improved with diuresis and antibiotics. COVID PCR negative. Patient weaned off O2 and currently on room air.  Admits significant improvement in dyspnea and anasarca.  Lower extremities with mild edema, but significantly improved since admission.  Patient denies any fever, headaches, vision changes, chest pain, shortness of breath, difficulty breathing, abdominal pain, nausea, vomiting, or any new weaknesses.  No issues with urination. Patient's exam on discharge was as follow: Patient is alert and oriented, appears in no acute distress, heart with regular rate and rhythm, lungs clear to asculation with non-labored breathing, abdomen soft and nontender, and no new weaknesses or focal deficits seen.  Bilateral lower extremity with +1 pitting edema, significantly improved.  No calf tenderness bilaterally.    Patient was counseled regarding any abnormal labs, differential diagnosis, treatment options, risk-benefit, lifestyle changes, prognosis, current condition, and medications. Patient was interactive and attentive.  " Patient's questions were answered in a respectful and timely manner. Patient was instructed to follow-up with PCP within 1 week and to continue taking medications as prescribed.  Instructed to also follow up with nephrology and cardiology outpatient. Referral placed if needed. Also, extensively discussed the risks, benefits, and side effects of patient's medications. Discussed with patient about any medication changes. Provided refills for the requested medications and sent new prescriptions to pharmacy. Patient verbalized understanding and agrees to treatment plan.  Patient is stable for discharge.  Patient has no other questions or concerns at this time.  ED precautions discussed with the patient.    Vital signs are stable. Ambulating without any difficulty. Tolerating p.o. intake without any nausea or vomiting. Afebrile for over 24 hours. Patient is in stable condition and has no questions or concerns. Patient will be discharge to home with home health for PT and nurse. Referrals placed for nephrology and cardiology

## 2022-02-05 NOTE — HPI
"71 year old male present to ED with c/o persistent shortness of breath onset 1 day ago. Patient also notes symptoms of leg swelling (onset "earlier today"), chills, chest pain, cough, chest tightness, and abdominal distension. Patient reports that he ran out of his prescribed Lasix 1 day ago, but otherwise endorses medication compliance. Patient has attempted Tx with Albuterol inhaler, but notes no alleviation. Patient also states that his chest pain is exacerbated by coughing. PMHx CHF, Asthma, CAD, DM, HTN, HLD.               "

## 2022-02-05 NOTE — CARE UPDATE
"I have read and reviewed the findings on documentation from staff HM provider, ANA Estrada, and agree with the ROS, PE, and Plan from 2/5/2022, Please see assessment and plan in full details. Additions and/or changes below:     71 year old AAM who reports acute onset 2 day history of MACK and LE swelling. Covid rapid negative and BNP consistent with prior levels. He had CXR finding which shows, "bilateral pattern of patchy pulmonary infiltrates.  This is more prominent about the perihilar region and lung bases". He denies recent sick contact but acute SOB, difficulty breathing, conversing in incomplete sentences stopping to breath between 2-3 words, and severe MACK. IV lasix in ED with minimal improvement.      PLAN:    - Start azithromycin + ceftriaxone for CAP due to CXR findings  - Rapid Covid negative but due to symptomology will obtain a COVID PCR  - Ferritin, procal, lactic  - One dose IV solumedrol 60 mg      Present on Admission:   CAD (coronary artery disease)   Essential hypertension   Hyperlipidemia   Paroxysmal atrial flutter   Acute on chronic diastolic congestive heart failure   Acute renal failure   Pneumonia due to infectious organism   CKD (chronic kidney disease), stage III            La Thornton, DNP, APRN, FNP-C  Department of Hospital Medicine - [facility]  [address] [place] [address]  Office [phone]; Pager [phone]        "

## 2022-02-05 NOTE — ED PROVIDER NOTES
"Encounter Date: 2/5/2022    SCRIBE #1 NOTE: I, Kay Hodges, am scribing for, and in the presence of,  Robert Ferrer MD. I have scribed the following portions of the note - Other sections scribed: HPI, ROS.       History     Chief Complaint   Patient presents with    Shortness of Breath     Pt SOB x2hrs. Home inhaler hasn't helped. + wheezing and labored breathing. Hx of CHF. Pt placed on CPAP with EMS. +BLE swelling. EMS gave ASA 324mg and x2 Nitro SL. Pt also with complaints of midline CP. Per EMS report pt initial sats were 92% on RA.     Chest Pain     Chato Bowie is a 71 y.o. male, with a past medical history of CHF, Asthma, and CAD, who presents to the ED with persistent shortness of breath onset 1 day ago. Patient also notes symptoms of leg swelling (onset "earlier today"), chills, chest pain, cough, chest tightness, and abdominal distension. Patient reports that he ran out of his prescribed Lasix 1 day ago, but otherwise endorses medication compliance. Patient has attempted Tx with Albuterol inhaler, but notes no alleviation. Patient also states that his chest pain is exacerbated by coughing. No other exacerbating or alleviating factors. Patient denies other associated symptoms.       The history is provided by the patient and the EMS personnel.     Review of patient's allergies indicates:  No Known Allergies  Past Medical History:   Diagnosis Date    Acute congestive heart failure 3/20/2020    Alcohol abuse     Arthritis     Asthma attack 11/24/2021    Coronary artery disease     Diabetes mellitus     Hyperlipemia     Hypertension     Rhabdomyolysis      Past Surgical History:   Procedure Laterality Date    EYE SURGERY      TREATMENT OF CARDIAC ARRHYTHMIA N/A 12/7/2020    Procedure: Cardioversion or Defibrillation;  Surgeon: Indra Foote MD;  Location: [facility] CATH LAB;  Service: Cardiology;  Laterality: N/A;    TREATMENT OF CARDIAC ARRHYTHMIA N/A 12/30/2020    Procedure: " Cardioversion or Defibrillation;  Surgeon: Tyrone Steen MD;  Location: [facility] CATH LAB;  Service: Cardiology;  Laterality: N/A;    VASCULAR SURGERY       Family History   Problem Relation Age of Onset    Hypertension Mother     Diabetes Mother     Diabetes Father     Hypertension Father     Diabetes Sister     Hypertension Sister      Social History     Tobacco Use    Smoking status: Never Smoker    Smokeless tobacco: Never Used   Substance Use Topics    Alcohol use: Not Currently     Alcohol/week: 4.0 standard drinks     Types: 4 Cans of beer per week    Drug use: No     Review of Systems   Constitutional: Positive for chills. Negative for fever.   HENT: Negative.  Negative for congestion, rhinorrhea and sore throat.    Eyes: Negative.  Negative for visual disturbance.   Respiratory: Positive for cough, chest tightness and shortness of breath.    Cardiovascular: Positive for chest pain and leg swelling.   Gastrointestinal: Positive for abdominal distention. Negative for abdominal pain, diarrhea, nausea and vomiting.   Genitourinary: Negative.  Negative for dysuria, frequency and hematuria.   Musculoskeletal: Negative.  Negative for back pain.   Skin: Negative.  Negative for rash.   Neurological: Negative.  Negative for dizziness, weakness and headaches.       Physical Exam     Initial Vitals   BP Pulse Resp Temp SpO2   02/05/22 0205 02/05/22 0205 02/05/22 0207 02/05/22 0409 02/05/22 0205   (!) 156/82 107 16 98.5 °F (36.9 °C) 97 %      MAP       --                Physical Exam    Nursing note and vitals reviewed.  Constitutional: He is not diaphoretic. He appears distressed (moderately).   HENT:   Head: Normocephalic and atraumatic.   Nose: Nose normal.   Mouth/Throat: No oropharyngeal exudate.   Eyes: EOM are normal. Pupils are equal, round, and reactive to light.   Neck: Neck supple. No tracheal deviation present. JVD present.   Normal range of motion.  Cardiovascular: Regular rhythm, normal heart  sounds and intact distal pulses.   Tachycardic   Pulmonary/Chest: He is in respiratory distress (Mild tachypnea, diffuse wheezing). He has wheezes.   Abdominal: Abdomen is soft. Bowel sounds are normal. He exhibits no distension. There is no abdominal tenderness. There is no rebound and no guarding.   Musculoskeletal:         General: Edema ( 2+ pitting edema bilateral lower extremity) present. No tenderness. Normal range of motion.      Cervical back: Normal range of motion and neck supple.     Neurological: He is alert and oriented to person, place, and time. He has normal strength. No cranial nerve deficit.   Skin: Skin is warm and dry. Capillary refill takes less than 2 seconds. No rash noted. No erythema.         ED Course   Critical Care    Date/Time: 2/5/2022 2:15 AM  Performed by: Robert Ferrer MD  Authorized by: Robert Ferrer MD   Direct patient critical care time: 15 minutes  Additional history critical care time: 5 minutes  Ordering / reviewing critical care time: 5 minutes  Documentation critical care time: 5 minutes  Consulting other physicians critical care time: 5 minutes  Consult with family critical care time: 5 minutes  Total critical care time (exclusive of procedural time) : 40 minutes  Critical care time was exclusive of separately billable procedures and treating other patients and teaching time.  Critical care was necessary to treat or prevent imminent or life-threatening deterioration of the following conditions: shock and respiratory failure.  Critical care was time spent personally by me on the following activities: development of treatment plan with patient or surrogate, discussions with consultants, evaluation of patient's response to treatment, examination of patient, obtaining history from patient or surrogate, ordering and performing treatments and interventions, ordering and review of laboratory studies, ordering and review of radiographic studies, re-evaluation of  patient's condition and review of old charts.        Labs Reviewed   CBC W/ AUTO DIFFERENTIAL - Abnormal; Notable for the following components:       Result Value    RBC 4.57 (*)     Hemoglobin 12.2 (*)     Hematocrit 37.3 (*)     MCH 26.7 (*)     MPV 8.9 (*)     Mono # 1.1 (*)     Lymph % 14.4 (*)     All other components within normal limits   COMPREHENSIVE METABOLIC PANEL - Abnormal; Notable for the following components:    Potassium 2.9 (*)     BUN 35 (*)     Creatinine 1.8 (*)     ALT 8 (*)     eGFR if  43 (*)     eGFR if non  37 (*)     All other components within normal limits   C-REACTIVE PROTEIN - Abnormal; Notable for the following components:    CRP 13.7 (*)     All other components within normal limits   B-TYPE NATRIURETIC PEPTIDE - Abnormal; Notable for the following components:     (*)     All other components within normal limits   ISTAT PROCEDURE - Abnormal; Notable for the following components:    POC PO2 71 (*)     POC SATURATED O2 94 (*)     All other components within normal limits   LACTIC ACID, PLASMA   TROPONIN I   PROCALCITONIN   MAGNESIUM   PHOSPHORUS   MAGNESIUM   PHOSPHORUS   SARS-COV-2 RDRP GENE          Imaging Results          X-Ray Chest AP Portable (Final result)  Result time 02/05/22 02:36:46    Final result by Naun Chang MD (02/05/22 02:36:46)                 Impression:      Intrathoracic findings as above.      Electronically signed by: Naun Chang  Date:    02/05/2022  Time:    02:36             Narrative:    EXAMINATION:  XR CHEST AP PORTABLE    CLINICAL HISTORY:  COVID-19;    TECHNIQUE:  Single frontal view of the chest was performed.    COMPARISON:  Chest radiograph December 9, 2021    FINDINGS:  Single portable chest view is submitted.  Mild diminished depth of inspiration noted.  The cardiomediastinal silhouette appears enlarged, stable appearance when compared to the prior exam.    There is central pulmonary vascular  prominence.  There is bilateral pattern of patchy pulmonary infiltrates.  This is more prominent about the perihilar region and lung bases.    There is no evidence for pleural effusion and there is no evidence for pneumothorax.  The osseous structures demonstrate chronic change.                                 Medications   furosemide injection 80 mg (80 mg Intravenous Given 2/5/22 0407)   albuterol sulfate nebulizer solution 5 mg (5 mg Nebulization Given 2/5/22 0422)   ipratropium 0.02 % nebulizer solution 1 mg (1 mg Nebulization Given 2/5/22 0422)   potassium chloride 10 mEq in 100 mL IVPB (10 mEq Intravenous New Bag 2/5/22 0420)   potassium bicarbonate disintegrating tablet 25 mEq (25 mEq Oral Given 2/5/22 0409)     Medical Decision Making:   History:   Old Medical Records: I decided to obtain old medical records.  Clinical Tests:   Lab Tests: Ordered and Reviewed  Radiological Study: Ordered and Reviewed  Medical Tests: Ordered and Reviewed    MDM:    71-year-old male with past medical history as noted above presenting with shortness of breath, chest tightness.  Patient noted to be in atrial flutter with rapid ventricular response rate of 107 beats per minute, left bundle-branch block present similar to prior EKG, no STEMI.  Patient presented on CPAP and transition to BiPAP for work of breathing with some mild hypoxia reported by EMS at home.  Patient given Lasix, potassium as his potassium was low at 2.9, in addition to albuterol neb with some slight improvement in his symptoms, and diuresis.  Patient able to treat transitioned off of BiPAP, not hypoxic, not significantly tachypneic, still with some chest tightness, mild tachycardia noted.  Patient will need continued observation for additional diuresis, suspected CHF exacerbation rather than asthma exacerbation as he has previously had.  Discussed with observation team will admit for further management. Discussed diagnosis and further treatment with patient at  bedside. All questions answered, patient transferred to floor improved and stable.          Scribe Attestation:   Scribe #1: I performed the above scribed service and the documentation accurately describes the services I performed. I attest to the accuracy of the note.                 Clinical Impression:   Final diagnoses:  [U07.1] COVID-19  [I50.9] CHF exacerbation       I, Robert Ferrer M.D., personally performed the services described in this documentation. All medical record entries made by the scribe were at my direction and in my presence. I have reviewed the chart and agree that the record reflects my personal performance and is accurate and complete.     ED Disposition Condition    Observation               Robert Ferrer MD  02/05/22 0519

## 2022-02-05 NOTE — SUBJECTIVE & OBJECTIVE
Past Medical History:   Diagnosis Date    Acute congestive heart failure 3/20/2020    Alcohol abuse     Arthritis     Asthma attack 11/24/2021    Coronary artery disease     Diabetes mellitus     Hyperlipemia     Hypertension     Rhabdomyolysis        Past Surgical History:   Procedure Laterality Date    EYE SURGERY      TREATMENT OF CARDIAC ARRHYTHMIA N/A 12/7/2020    Procedure: Cardioversion or Defibrillation;  Surgeon: Indra Foote MD;  Location: [facility] CATH LAB;  Service: Cardiology;  Laterality: N/A;    TREATMENT OF CARDIAC ARRHYTHMIA N/A 12/30/2020    Procedure: Cardioversion or Defibrillation;  Surgeon: Tyrone Steen MD;  Location: [facility] CATH LAB;  Service: Cardiology;  Laterality: N/A;    VASCULAR SURGERY         Review of patient's allergies indicates:  No Known Allergies    No current facility-administered medications on file prior to encounter.     Current Outpatient Medications on File Prior to Encounter   Medication Sig    albuterol (PROVENTIL/VENTOLIN HFA) 90 mcg/actuation inhaler Inhale 2 puffs into the lungs every 4 (four) hours as needed.    amiodarone (PACERONE) 200 MG Tab Take 200 mg by mouth once daily.    amLODIPine (NORVASC) 10 MG tablet Take 10 mg by mouth once daily.    apixaban (ELIQUIS) 5 mg Tab Take 5 mg by mouth 2 (two) times daily.    aspirin 81 MG Chew Take 1 tablet (81 mg total) by mouth once daily.    DUREZOL 0.05 % Drop ophthalmic solution INT 1 GTT INTO OD FID FOR 3 WEEKS    famotidine (PEPCID) 40 MG tablet Take 40 mg by mouth once daily.    fluticasone-salmeterol diskus inhaler 100-50 mcg     furosemide (LASIX) 80 MG tablet Take 1 tablet (80 mg total) by mouth 2 (two) times a day.    hydrALAZINE (APRESOLINE) 25 MG tablet Take 1 tablet (25 mg total) by mouth every 8 (eight) hours.    insulin aspart U-100 (NOVOLOG) 100 unit/mL (3 mL) InPn pen Inject 5 Units into the skin 3 (three) times daily.    losartan-hydrochlorothiazide 100-25 mg (HYZAAR) 100-25  mg per tablet Take 1 tablet by mouth once daily.    magnesium oxide (MAG-OX) 400 mg (241.3 mg magnesium) tablet Take 800 mg by mouth.    metOLazone (ZAROXOLYN) 5 MG tablet TAKE 1 TABLET BY MOUTH 3 TIMES A WEEK AS NEEDED FOR SEVERE LEG SWELLING    metoprolol succinate (TOPROL-XL) 50 MG 24 hr tablet Take 50 mg by mouth once daily.    multivitamin Tab Take 1 tablet by mouth once daily.    NOVOLIN 70/30 U-100 INSULIN 100 unit/mL (70-30) injection     omeprazole (PRILOSEC) 20 MG capsule Take 20 mg by mouth once daily.    potassium chloride SA (K-DUR,KLOR-CON) 20 MEQ tablet Take 2 tablets by mouth once daily.    rosuvastatin (CRESTOR) 20 MG tablet Take 20 mg by mouth once daily.    spironolactone (ALDACTONE) 25 MG tablet     tamsulosin (FLOMAX) 0.4 mg Cap Take 1 capsule (0.4 mg total) by mouth once daily.     Family History     Problem Relation (Age of Onset)    Diabetes Mother, Father, Sister    Hypertension Mother, Father, Sister        Tobacco Use    Smoking status: Never Smoker    Smokeless tobacco: Never Used   Substance and Sexual Activity    Alcohol use: Not Currently     Alcohol/week: 4.0 standard drinks     Types: 4 Cans of beer per week    Drug use: No    Sexual activity: Yes     Partners: Female     Review of Systems   Constitutional: Positive for chills. Negative for fever.   HENT: Negative.  Negative for congestion, rhinorrhea and sore throat.    Eyes: Negative.  Negative for visual disturbance.   Respiratory: Positive for cough, chest tightness and shortness of breath.    Cardiovascular: Positive for chest pain and leg swelling.   Gastrointestinal: Positive for abdominal distention. Negative for abdominal pain, diarrhea, nausea and vomiting.   Genitourinary: Negative.  Negative for dysuria, frequency and hematuria.   Musculoskeletal: Negative.  Negative for back pain.   Skin: Negative.  Negative for rash.   Neurological: Negative.  Negative for dizziness, weakness and headaches.   Objective:      Vital Signs (Most Recent):  Temp: 98.5 °F (36.9 °C) (02/05/22 0409)  Pulse: 107 (02/05/22 0432)  Resp: (!) 22 (02/05/22 0432)  BP: (!) 158/87 (02/05/22 0432)  SpO2: 100 % (02/05/22 0432) Vital Signs (24h Range):  Temp:  [98.5 °F (36.9 °C)] 98.5 °F (36.9 °C)  Pulse:  [106-108] 107  Resp:  [16-30] 22  SpO2:  [97 %-100 %] 100 %  BP: (150-167)/(82-87) 158/87     Weight: 104.3 kg (230 lb)  Body mass index is 32.08 kg/m².    Physical Exam  Nursing note and vitals reviewed.  Constitutional: He is not diaphoretic. He appears distressed (moderately).   HENT:   Head: Normocephalic and atraumatic.   Nose: Nose normal.   Mouth/Throat: No oropharyngeal exudate.   Eyes: EOM are normal. Pupils are equal, round, and reactive to light.   Neck: Neck supple. No tracheal deviation present. JVD present.   Normal range of motion.  Cardiovascular: Regular rhythm, normal heart sounds and intact distal pulses.   Tachycardic   Pulmonary/Chest: He is in respiratory distress (Mild tachypnea, diffuse wheezing). He has wheezes.   Abdominal: Abdomen is soft. Bowel sounds are normal. He exhibits no distension. There is no abdominal tenderness. There is no rebound and no guarding.   Musculoskeletal:         General: Edema ( 2+ pitting edema bilateral lower extremity) present. No tenderness. Normal range of motion.      Cervical back: Normal range of motion and neck supple.     Neurological: He is alert and oriented to person, place, and time. He has normal strength. No cranial nerve deficit.   Skin: Skin is warm and dry. Capillary refill takes less than 2 seconds. No rash noted. No erythema.      Significant Labs: All pertinent labs within the past 24 hours have been reviewed.    Significant Imaging: I have reviewed all pertinent imaging results/findings within the past 24 hours.

## 2022-02-05 NOTE — Clinical Note
Diagnosis: CHF exacerbation [757304]   Future Attending Provider: CJ PATRICIA [[id number]]   Admitting Provider:: CJ PATRICIA [[id number]]

## 2022-02-05 NOTE — ASSESSMENT & PLAN NOTE
Trend level 1.8  (recently patient was high as 3.9)   Avoid any nephrotoxin medication  Strict I and Os   Daily wgt

## 2022-02-05 NOTE — ED TRIAGE NOTES
Patient identifiers for Chato Bowie 71 y.o. male checked and correct.  Chief Complaint   Patient presents with    Shortness of Breath     Pt SOB x2hrs. Home inhaler hasn't helped. + wheezing and labored breathing. Hx of CHF. Pt placed on CPAP with EMS. +BLE swelling. EMS gave ASA 324mg and x2 Nitro SL. Pt also with complaints of midline CP. Per EMS report pt initial sats were 92% on RA.     Chest Pain     Past Medical History:   Diagnosis Date    Acute congestive heart failure 3/20/2020    Alcohol abuse     Arthritis     Asthma attack 11/24/2021    Coronary artery disease     Diabetes mellitus     Hyperlipemia     Hypertension     Rhabdomyolysis      Allergies reported: Review of patient's allergies indicates:  No Known Allergies      LOC: Patient is awake, alert, and aware of environment with an appropriate affect. Patient is oriented x 3 and speaking appropriately.  APPEARANCE: Patient resting comfortably and in no acute distress. Patient is clean and well groomed, patient's clothing is properly fastened.  SKIN: The skin is warm and dry. Patient has normal skin turgor and moist mucus membranes. Skin is intact; no bruising or breakdown noted.  MUSKULOSKELETAL: Patient is moving all extremities well, no obvious deformities noted. Pulses intact.   RESPIRATORY: Airway is open and patent. Respirations are spontaneous and labored with tachypnea.   CARDIAC: Patient has a normal rate and rhythm. Pt on cardiac monitor, BLE and BUE  edema noted.   ABDOMEN: No distention noted. Bowel sounds active in all 4 quadrants. Soft and non-tender upon palpation.  NEUROLOGICAL:  PERRL. Facial expression is symmetrical. Hand grasps are equal bilaterally. Normal sensation in all extremities when touched with finger.

## 2022-02-05 NOTE — PROGRESS NOTES
Pharmacist Renal Dose Adjustment Note    Chato Bowie is a 71 y.o. male being treated with the medication Famotidine 40mg    Patient Data:    Vital Signs (Most Recent):  Temp: 98.5 °F (36.9 °C) (02/05/22 0409)  Pulse: 107 (02/05/22 0432)  Resp: (!) 22 (02/05/22 0432)  BP: (!) 158/87 (02/05/22 0432)  SpO2: 100 % (02/05/22 0432)   Vital Signs (72h Range):  Temp:  [98.5 °F (36.9 °C)]   Pulse:  [106-108]   Resp:  [16-30]   BP: (150-167)/(82-87)   SpO2:  [97 %-100 %]      Recent Labs   Lab 02/05/22 0217   CREATININE 1.8*     Serum creatinine: 1.8 mg/dL (H) 02/05/22 0217  Estimated creatinine clearance: 46.3 mL/min (A)    Medication:Famotidine 40mg daily ordered. CrCL 46.3.  As per Renal Dose Adjustment per Pharmacy, Famotidine dose will be reduced to 20mg daily.    Pharmacist's Name: Adriel Medina  Pharmacist's Extension: [phone]

## 2022-02-06 LAB
ANION GAP SERPL CALC-SCNC: 10 MMOL/L (ref 8–16)
BUN SERPL-MCNC: 39 MG/DL (ref 8–23)
CALCIUM SERPL-MCNC: 10.3 MG/DL (ref 8.7–10.5)
CHLORIDE SERPL-SCNC: 106 MMOL/L (ref 95–110)
CO2 SERPL-SCNC: 26 MMOL/L (ref 23–29)
CREAT SERPL-MCNC: 2 MG/DL (ref 0.5–1.4)
EST. GFR  (AFRICAN AMERICAN): 38 ML/MIN/1.73 M^2
EST. GFR  (NON AFRICAN AMERICAN): 33 ML/MIN/1.73 M^2
GLUCOSE SERPL-MCNC: 150 MG/DL (ref 70–110)
POCT GLUCOSE: 169 MG/DL (ref 70–110)
POCT GLUCOSE: 172 MG/DL (ref 70–110)
POCT GLUCOSE: 205 MG/DL (ref 70–110)
POCT GLUCOSE: 238 MG/DL (ref 70–110)
POTASSIUM SERPL-SCNC: 4.2 MMOL/L (ref 3.5–5.1)
SODIUM SERPL-SCNC: 142 MMOL/L (ref 136–145)

## 2022-02-06 PROCEDURE — 63600175 PHARM REV CODE 636 W HCPCS: Performed by: NURSE PRACTITIONER

## 2022-02-06 PROCEDURE — 99223 1ST HOSP IP/OBS HIGH 75: CPT | Mod: ,,, | Performed by: INTERNAL MEDICINE

## 2022-02-06 PROCEDURE — 80048 BASIC METABOLIC PNL TOTAL CA: CPT | Performed by: NURSE PRACTITIONER

## 2022-02-06 PROCEDURE — 27000207 HC ISOLATION

## 2022-02-06 PROCEDURE — 21400001 HC TELEMETRY ROOM

## 2022-02-06 PROCEDURE — 63700000 PHARM REV CODE 250 ALT 637 W/O HCPCS: Performed by: NURSE PRACTITIONER

## 2022-02-06 PROCEDURE — 25000003 PHARM REV CODE 250: Performed by: NURSE PRACTITIONER

## 2022-02-06 PROCEDURE — 36415 COLL VENOUS BLD VENIPUNCTURE: CPT | Performed by: NURSE PRACTITIONER

## 2022-02-06 PROCEDURE — 99223 PR INITIAL HOSPITAL CARE,LEVL III: ICD-10-PCS | Mod: ,,, | Performed by: INTERNAL MEDICINE

## 2022-02-06 RX ADMIN — METOPROLOL SUCCINATE 50 MG: 50 TABLET, EXTENDED RELEASE ORAL at 09:02

## 2022-02-06 RX ADMIN — TAMSULOSIN HYDROCHLORIDE 0.4 MG: 0.4 CAPSULE ORAL at 09:02

## 2022-02-06 RX ADMIN — POTASSIUM CHLORIDE 40 MEQ: 20 TABLET, EXTENDED RELEASE ORAL at 09:02

## 2022-02-06 RX ADMIN — INSULIN ASPART 1 UNITS: 100 INJECTION, SOLUTION INTRAVENOUS; SUBCUTANEOUS at 09:02

## 2022-02-06 RX ADMIN — HYDRALAZINE HYDROCHLORIDE 25 MG: 25 TABLET, FILM COATED ORAL at 02:02

## 2022-02-06 RX ADMIN — AZITHROMYCIN MONOHYDRATE 250 MG: 250 TABLET ORAL at 09:02

## 2022-02-06 RX ADMIN — AMLODIPINE BESYLATE 10 MG: 5 TABLET ORAL at 09:02

## 2022-02-06 RX ADMIN — FAMOTIDINE 20 MG: 20 TABLET, FILM COATED ORAL at 09:02

## 2022-02-06 RX ADMIN — FUROSEMIDE 60 MG: 10 INJECTION, SOLUTION INTRAMUSCULAR; INTRAVENOUS at 07:02

## 2022-02-06 RX ADMIN — APIXABAN 5 MG: 5 TABLET, FILM COATED ORAL at 09:02

## 2022-02-06 RX ADMIN — Medication 800 MG: at 09:02

## 2022-02-06 RX ADMIN — HYDRALAZINE HYDROCHLORIDE 25 MG: 25 TABLET, FILM COATED ORAL at 09:02

## 2022-02-06 RX ADMIN — AMIODARONE HYDROCHLORIDE 200 MG: 200 TABLET ORAL at 09:02

## 2022-02-06 RX ADMIN — ATORVASTATIN CALCIUM 20 MG: 10 TABLET, FILM COATED ORAL at 09:02

## 2022-02-06 RX ADMIN — CEFTRIAXONE 1 G: 1 INJECTION, SOLUTION INTRAVENOUS at 02:02

## 2022-02-06 RX ADMIN — HYDRALAZINE HYDROCHLORIDE 25 MG: 25 TABLET, FILM COATED ORAL at 05:02

## 2022-02-06 RX ADMIN — ASPIRIN 81 MG CHEWABLE TABLET 81 MG: 81 TABLET CHEWABLE at 09:02

## 2022-02-06 NOTE — PLAN OF CARE
Problem: Fluid Imbalance (Pneumonia)  Goal: Fluid Balance  Outcome: Ongoing, Progressing     Problem: Respiratory Compromise (Pneumonia)  Goal: Effective Oxygenation and Ventilation  Outcome: Ongoing, Progressing     Problem: Diabetes Comorbidity  Goal: Blood Glucose Level Within Targeted Range  Outcome: Ongoing, Progressing  Educated on fluid restriction and verbalized understanding. Remains A-fib on telemetry monitor. No skin issues and no injury during shift. Educated on new medication and verbalized understanding. HS blood sugar 244, provided PRN insulin. Bed alarm activated and audible. Call light and urinal at side.

## 2022-02-06 NOTE — PLAN OF CARE
[facility]  Initial Discharge Assessment       Primary Care Provider: Irlanda GastelumBanner    Admission Diagnosis: CHF exacerbation [I50.9]  COVID-19 [U07.1]    Admission Date: 2/5/2022  Expected Discharge Date:     Discharge Barriers Identified: None    Payor: HU[employer] MANAGED MEDICARE / Plan: HU[employer] SNP (SPECIAL NEEDS PLAN) / Product Type: Medicare Advantage /     Extended Emergency Contact Information  Primary Emergency Contact: Dulce Ramirez   [facility]  Home Phone: [phone]  Relation: Sister    Discharge Plan A: Home Health  Discharge Plan B: Home      [id number] [place], [place] - [address]  [address]  [facility] [address]  Phone: [phone] Fax: [phone]    [facility] [address] [place], [place] - [address]  [address]  [facility] [address]  Phone: [phone] Fax: [phone]      Initial Assessment (most recent)     Adult Discharge Assessment - 02/06/22 1716        Discharge Assessment    Assessment Type Discharge Planning Assessment     Confirmed/corrected address, phone number and insurance Yes     Confirmed Demographics Correct on Facesheet     Source of Information patient     When was your last doctors appointment? 01/05/22     Communicated ELY with patient/caregiver Yes     Reason For Admission Coughing and SOB     Lives With alone     Do you expect to return to your current living situation? Yes     Do you have help at home or someone to help you manage your care at home? No     Prior to hospitilization cognitive status: Alert/Oriented     Current cognitive status: Alert/Oriented     Walking or Climbing Stairs Difficulty ambulation difficulty, requires equipment;stair climbing difficulty, requires equipment     Dressing/Bathing Difficulty none     Home Accessibility wheelchair accessible     Home Layout Able to live on 1st floor     Equipment Currently Used at Home cane,  straight;power chair     Readmission within 30 days? No     Patient currently being followed by outpatient case management? No     Do you currently have service(s) that help you manage your care at home? No     Do you take prescription medications? Yes     Do you have prescription coverage? Yes     Coverage [employer]     Do you have any problems affording any of your prescribed medications? No     Is the patient taking medications as prescribed? yes     Who is going to help you get home at discharge? Neighbor helps     Are you on dialysis? No     Do you take coumadin? No     Discharge Plan A Home Health     Discharge Plan B Home     DME Needed Upon Discharge  --   tbd    Discharge Plan discussed with: Patient     Discharge Barriers Identified None

## 2022-02-06 NOTE — CONSULTS
Thank you for your consult to [facility]. We have reviewed the patient chart. This patient does not meet criteria for [facility] service at this time due to Patient is a new admission. Will hand back to In-house service. Please re-consult tomorrow if patient remains stable for [facility].    Brigitte Emanuel MD  [facility]

## 2022-02-06 NOTE — PLAN OF CARE
Problem: Adult Inpatient Plan of Care  Goal: Plan of Care Review  Outcome: Ongoing, Progressing  Goal: Patient-Specific Goal (Individualized)  Outcome: Ongoing, Progressing  Goal: Absence of Hospital-Acquired Illness or Injury  Outcome: Ongoing, Progressing  Goal: Optimal Comfort and Wellbeing  Outcome: Ongoing, Progressing     Problem: Adjustment to Illness (Sepsis/Septic Shock)  Goal: Optimal Coping  Outcome: Ongoing, Progressing     Problem: Bleeding (Sepsis/Septic Shock)  Goal: Absence of Bleeding  Outcome: Ongoing, Progressing

## 2022-02-06 NOTE — NURSING
Report received from night nurse JOYCE Canales.    Visualized patient and assessed patient's overall condition and appearance. No acute distress noted. Will continue to monitor

## 2022-02-06 NOTE — PROGRESS NOTES
"[facility] Medicine  Progress Note    Patient Name: Chato Bowie  MRN: [id number]  Patient Class: IP- Inpatient   Admission Date: 2/5/2022  Length of Stay: 1 days  Attending Physician: Sea Phelan MD  [facility]        Subjective:     Principal Problem:Pneumonia due to infectious organism        HPI:  71 year old male present to ED with c/o persistent shortness of breath onset 1 day ago. Patient also notes symptoms of leg swelling (onset "earlier today"), chills, chest pain, cough, chest tightness, and abdominal distension. Patient reports that he ran out of his prescribed Lasix 1 day ago, but otherwise endorses medication compliance. Patient has attempted Tx with Albuterol inhaler, but notes no alleviation. Patient also states that his chest pain is exacerbated by coughing. PMHx CHF, Asthma, CAD, DM, HTN, HLD.                   Overview/Hospital Course:  71 year old AAM with Diastolic heart failure (preserved LVEF 55% [11/21]), Aortic stenosis, and pulmonary HTN, who reports acute onset 2 day history of MACK and LE swelling. IV lasix in ED with minimal improvement. Covid rapid negative and BNP consistent with prior levels. He had CXR finding which shows, "bilateral pattern of patchy pulmonary infiltrates.  This is more prominent about the perihilar region and lung bases". He denies recent sick contact but acute SOB, difficulty breathing, conversing in/incomplete sentences stopping to breath between 2-3 words, and severe MACK.       PLAN: Improving on azithromycin + ceftriaxone for CAP due to CXR findings; Due to symptomology obtain a COVID PCR ordered and pending. Supplemental oxygen for comfort - LE pitting edema consult to Cards for diuresis recommendation.      Interval History: Improving but far from baseline according to the patient; continue ABX for cap and defer diuresis to cardiology due to worsening creatine    Review of Systems   Constitutional: " Positive for activity change and fatigue. Negative for appetite change, chills, diaphoresis and fever.   HENT: Negative for congestion, hearing loss, sore throat, tinnitus and trouble swallowing.    Eyes: Negative for photophobia, discharge, itching and visual disturbance.   Respiratory: Negative for apnea, cough, wheezing and stridor.    Cardiovascular: Positive for chest pain and leg swelling. Negative for palpitations.   Gastrointestinal: Negative for abdominal distention, abdominal pain, blood in stool, constipation, diarrhea and nausea.   Endocrine: Negative for polydipsia, polyphagia and polyuria.   Genitourinary: Negative for difficulty urinating, dysuria, flank pain and frequency.   Musculoskeletal: Negative for arthralgias, joint swelling and neck stiffness.   Skin: Negative for color change, rash and wound.   Neurological: Positive for weakness. Negative for dizziness, tremors, seizures, light-headedness, numbness and headaches.   Hematological: Negative for adenopathy.   Psychiatric/Behavioral: Negative for hallucinations and self-injury.     Objective:     Vital Signs (Most Recent):  Temp: 97.7 °F (36.5 °C) (02/06/22 1056)  Pulse: 75 (02/06/22 1056)  Resp: 18 (02/06/22 1056)  BP: 132/75 (02/06/22 1441)  SpO2: 96 % (02/06/22 1056) Vital Signs (24h Range):  Temp:  [97.6 °F (36.4 °C)-98 °F (36.7 °C)] 97.7 °F (36.5 °C)  Pulse:  [] 75  Resp:  [18] 18  SpO2:  [94 %-100 %] 96 %  BP: (124-164)/(66-93) 132/75     Weight: 104.3 kg (230 lb)  Body mass index is 32.08 kg/m².    Intake/Output Summary (Last 24 hours) at 2/6/2022 1627  Last data filed at 2/6/2022 1200  Gross per 24 hour   Intake 480 ml   Output 1200 ml   Net -720 ml      Physical Exam  Constitutional:       General: He is not in acute distress.     Appearance: He is well-developed and well-nourished. He is not diaphoretic.   HENT:      Head: Normocephalic and atraumatic.   Cardiovascular:      Rate and Rhythm: Normal rate and regular rhythm.    Pulmonary:      Effort: Pulmonary effort is normal. No respiratory distress.      Breath sounds: No wheezing or rales.   Abdominal:      General: There is no distension.      Palpations: Abdomen is soft.      Tenderness: There is no abdominal tenderness.      Comments: Large abdominal girth   Genitourinary:     Rectum: Guaiac result negative.   Musculoskeletal:         General: Swelling present. No tenderness. Normal range of motion.      Right lower leg: Edema present.      Left lower leg: Edema present.   Skin:     Findings: No erythema or rash.   Neurological:      General: No focal deficit present.      Mental Status: He is alert and oriented to person, place, and time.         Significant Labs: All pertinent labs within the past 24 hours have been reviewed.    Significant Imaging: I have reviewed all pertinent imaging results/findings within the past 24 hours.      Assessment/Plan:      Acute on chronic diastolic congestive heart failure  Heart failure 2/2 to Aortic Stenosis   Continue Fluid restriction, Strict I&Os, Daily weight  Fluid restrict 1.5L   Defer lasix for now after aggressive diuresis in ED his creatine worsening from 1.8-->2.0  Consult to cardiology for opinion  Treat CAP (infiltrates)    CKD (chronic kidney disease), stage III  Worsening creatine after IV lasix - hold for now and defer diuresis to cardiology;   - avoid nephrotoxins   - renally dose meds   - Follow labs         Type 2 diabetes mellitus with kidney complication, with long-term current use of insulin  Last A1c reviewed-   Lab Results   Component Value Date    HGBA1C 7.3 (H) 12/10/2021     Hold home oral hyperglycemics if indicated - while hospitalized will use combined insulin therapy with basal and prandial insulin coverage, POCT glucose checks, hypoglycemic protocol and moderate correction scale - repeat HgA1c if no record within last 90 days.    Paroxysmal atrial flutter  Resume home eliquis   continuous tele monitoring   ekg  as needed    Hypokalemia  Replete as needed  monitor    Hyperlipidemia  Resume home meds    Essential hypertension  Resume home meds  Held losartan due to elevated creatinine    Acute renal failure  Trend level 1.8  (recently patient was high as 3.9)   Avoid any nephrotoxin medication  Strict I and Os   Daily wgt    CAD (coronary artery disease)  Resume home meds        VTE Risk Mitigation (From admission, onward)         Ordered     IP VTE HIGH RISK PATIENT  Once         02/05/22 1119     apixaban tablet 5 mg  2 times daily         02/05/22 0538                Discharge Planning   ELY:  02/07/22    Code Status: Full Code   Is the patient medically ready for discharge?:     Reason for patient still in hospital (select all that apply): Patient unstable, Patient trending condition and Treatment             La Thornton, DNP, APRN, FNP-C  Department of [facility] Medicine - [facility]  [address] [place] [address]  Office [phone]; Pager [phone]

## 2022-02-06 NOTE — ASSESSMENT & PLAN NOTE
Heart failure 2/2 to Aortic Stenosis   Continue Fluid restriction, Strict I&Os, Daily weight  Fluid restrict 1.5L   Defer lasix for now after aggressive diuresis in ED his creatine worsening from 1.8-->2.0  Consult to cardiology for opinion  Treat CAP (infiltrates)

## 2022-02-06 NOTE — ASSESSMENT & PLAN NOTE
Worsening creatine after IV lasix - hold for now and defer diuresis to cardiology;   - avoid nephrotoxins   - renally dose meds   - Follow labs

## 2022-02-06 NOTE — ASSESSMENT & PLAN NOTE
Last A1c reviewed-   Lab Results   Component Value Date    HGBA1C 7.3 (H) 12/10/2021     Hold home oral hyperglycemics if indicated - while hospitalized will use combined insulin therapy with basal and prandial insulin coverage, POCT glucose checks, hypoglycemic protocol and moderate correction scale - repeat HgA1c if no record within last 90 days.

## 2022-02-06 NOTE — NURSING
Patient oxygen removed for evaluation of O2 sats pending discharge per ELIZABETH Rico.  He ambulated to and from the restroom and around the room. He stated that he had been up to the restroom without oxygen twice before I came in. He denies sob and difficulty breathing. His oxygen saturation was 98% on room air. His O2 sats went to 94-96% on room air when checked after he came out of the restroom. He did not require oxygen after sitting down. He recovered quickly. Patient in bed, watching TV. ELIZABETH Thornton notified. No distress noted. Will continue to monitor.

## 2022-02-06 NOTE — SUBJECTIVE & OBJECTIVE
Interval History: Improving but far from baseline according to the patient; continue ABX for cap and defer diuresis to cardiology due to worsening creatine    Review of Systems   Constitutional: Positive for activity change and fatigue. Negative for appetite change, chills, diaphoresis and fever.   HENT: Negative for congestion, hearing loss, sore throat, tinnitus and trouble swallowing.    Eyes: Negative for photophobia, discharge, itching and visual disturbance.   Respiratory: Negative for apnea, cough, wheezing and stridor.    Cardiovascular: Positive for chest pain and leg swelling. Negative for palpitations.   Gastrointestinal: Negative for abdominal distention, abdominal pain, blood in stool, constipation, diarrhea and nausea.   Endocrine: Negative for polydipsia, polyphagia and polyuria.   Genitourinary: Negative for difficulty urinating, dysuria, flank pain and frequency.   Musculoskeletal: Negative for arthralgias, joint swelling and neck stiffness.   Skin: Negative for color change, rash and wound.   Neurological: Positive for weakness. Negative for dizziness, tremors, seizures, light-headedness, numbness and headaches.   Hematological: Negative for adenopathy.   Psychiatric/Behavioral: Negative for hallucinations and self-injury.     Objective:     Vital Signs (Most Recent):  Temp: 97.7 °F (36.5 °C) (02/06/22 1056)  Pulse: 75 (02/06/22 1056)  Resp: 18 (02/06/22 1056)  BP: 132/75 (02/06/22 1441)  SpO2: 96 % (02/06/22 1056) Vital Signs (24h Range):  Temp:  [97.6 °F (36.4 °C)-98 °F (36.7 °C)] 97.7 °F (36.5 °C)  Pulse:  [] 75  Resp:  [18] 18  SpO2:  [94 %-100 %] 96 %  BP: (124-164)/(66-93) 132/75     Weight: 104.3 kg (230 lb)  Body mass index is 32.08 kg/m².    Intake/Output Summary (Last 24 hours) at 2/6/2022 0267  Last data filed at 2/6/2022 1200  Gross per 24 hour   Intake 480 ml   Output 1200 ml   Net -720 ml      Physical Exam  Constitutional:       General: He is not in acute distress.      Appearance: He is well-developed and well-nourished. He is not diaphoretic.   HENT:      Head: Normocephalic and atraumatic.   Cardiovascular:      Rate and Rhythm: Normal rate and regular rhythm.   Pulmonary:      Effort: Pulmonary effort is normal. No respiratory distress.      Breath sounds: No wheezing or rales.   Abdominal:      General: There is no distension.      Palpations: Abdomen is soft.      Tenderness: There is no abdominal tenderness.      Comments: Large abdominal girth   Genitourinary:     Rectum: Guaiac result negative.   Musculoskeletal:         General: Swelling present. No tenderness. Normal range of motion.      Right lower leg: Edema present.      Left lower leg: Edema present.   Skin:     Findings: No erythema or rash.   Neurological:      General: No focal deficit present.      Mental Status: He is alert and oriented to person, place, and time.         Significant Labs: All pertinent labs within the past 24 hours have been reviewed.    Significant Imaging: I have reviewed all pertinent imaging results/findings within the past 24 hours.

## 2022-02-07 LAB
ANION GAP SERPL CALC-SCNC: 10 MMOL/L (ref 8–16)
AORTIC ROOT ANNULUS: 3.5 CM
AORTIC VALVE CUSP SEPERATION: 1.11 CM
ASCENDING AORTA: 3.5 CM
AV INDEX (PROSTH): 0.29
AV MEAN GRADIENT: 22 MMHG
AV PEAK GRADIENT: 34 MMHG
AV VALVE AREA: 1.3 CM2
AV VELOCITY RATIO: 0.32
BSA FOR ECHO PROCEDURE: 2.29 M2
BUN SERPL-MCNC: 45 MG/DL (ref 8–23)
CALCIUM SERPL-MCNC: 9.4 MG/DL (ref 8.7–10.5)
CHLORIDE SERPL-SCNC: 106 MMOL/L (ref 95–110)
CO2 SERPL-SCNC: 25 MMOL/L (ref 23–29)
CREAT SERPL-MCNC: 1.9 MG/DL (ref 0.5–1.4)
CV ECHO LV RWT: 0.4 CM
DOP CALC AO PEAK VEL: 2.9 M/S
DOP CALC AO VTI: 71.18 CM
DOP CALC LVOT AREA: 4.4 CM2
DOP CALC LVOT DIAMETER: 2.38 CM
DOP CALC LVOT PEAK VEL: 0.92 M/S
DOP CALC LVOT STROKE VOLUME: 92.76 CM3
DOP CALCLVOT PEAK VEL VTI: 20.86 CM
ECHO LV POSTERIOR WALL: 1.06 CM (ref 0.6–1.1)
EJECTION FRACTION: 55 %
EST. GFR  (AFRICAN AMERICAN): 40 ML/MIN/1.73 M^2
EST. GFR  (NON AFRICAN AMERICAN): 35 ML/MIN/1.73 M^2
FRACTIONAL SHORTENING: 30 % (ref 28–44)
GLUCOSE SERPL-MCNC: 137 MG/DL (ref 70–110)
INTERVENTRICULAR SEPTUM: 0.97 CM (ref 0.6–1.1)
IVRT: 71.36 MSEC
LEFT ATRIUM SIZE: 5.59 CM
LEFT INTERNAL DIMENSION IN SYSTOLE: 3.67 CM (ref 2.1–4)
LEFT VENTRICLE DIASTOLIC VOLUME INDEX: 59.27 ML/M2
LEFT VENTRICLE DIASTOLIC VOLUME: 132.77 ML
LEFT VENTRICLE MASS INDEX: 90 G/M2
LEFT VENTRICLE SYSTOLIC VOLUME INDEX: 25.5 ML/M2
LEFT VENTRICLE SYSTOLIC VOLUME: 57.12 ML
LEFT VENTRICULAR INTERNAL DIMENSION IN DIASTOLE: 5.26 CM (ref 3.5–6)
LEFT VENTRICULAR MASS: 201.85 G
MV PEAK E VEL: 1.52 M/S
PISA TR MAX VEL: 3.34 M/S
POCT GLUCOSE: 122 MG/DL (ref 70–110)
POCT GLUCOSE: 130 MG/DL (ref 70–110)
POTASSIUM SERPL-SCNC: 3.9 MMOL/L (ref 3.5–5.1)
PV PEAK VELOCITY: 1.02 CM/S
RA PRESSURE: 8 MMHG
SARS-COV-2 RNA RESP QL NAA+PROBE: NOT DETECTED
SARS-COV-2- CYCLE NUMBER: NORMAL
SINUS: 3.2 CM
SODIUM SERPL-SCNC: 141 MMOL/L (ref 136–145)
STJ: 2.66 CM
TR MAX PG: 45 MMHG
TRICUSPID ANNULAR PLANE SYSTOLIC EXCURSION: 2.13 CM
TV REST PULMONARY ARTERY PRESSURE: 53 MMHG

## 2022-02-07 PROCEDURE — 80048 BASIC METABOLIC PNL TOTAL CA: CPT | Performed by: STUDENT IN AN ORGANIZED HEALTH CARE EDUCATION/TRAINING PROGRAM

## 2022-02-07 PROCEDURE — 25000242 PHARM REV CODE 250 ALT 637 W/ HCPCS: Performed by: NURSE PRACTITIONER

## 2022-02-07 PROCEDURE — 21400001 HC TELEMETRY ROOM

## 2022-02-07 PROCEDURE — 99233 PR SUBSEQUENT HOSPITAL CARE,LEVL III: ICD-10-PCS | Mod: ,,, | Performed by: INTERNAL MEDICINE

## 2022-02-07 PROCEDURE — 63600175 PHARM REV CODE 636 W HCPCS: Performed by: STUDENT IN AN ORGANIZED HEALTH CARE EDUCATION/TRAINING PROGRAM

## 2022-02-07 PROCEDURE — 25000003 PHARM REV CODE 250: Performed by: NURSE PRACTITIONER

## 2022-02-07 PROCEDURE — 63700000 PHARM REV CODE 250 ALT 637 W/O HCPCS: Performed by: NURSE PRACTITIONER

## 2022-02-07 PROCEDURE — 99233 SBSQ HOSP IP/OBS HIGH 50: CPT | Mod: ,,, | Performed by: INTERNAL MEDICINE

## 2022-02-07 PROCEDURE — 25000003 PHARM REV CODE 250: Performed by: INTERNAL MEDICINE

## 2022-02-07 PROCEDURE — 94640 AIRWAY INHALATION TREATMENT: CPT

## 2022-02-07 PROCEDURE — 63600175 PHARM REV CODE 636 W HCPCS: Performed by: INTERNAL MEDICINE

## 2022-02-07 PROCEDURE — 94760 N-INVAS EAR/PLS OXIMETRY 1: CPT

## 2022-02-07 PROCEDURE — 36415 COLL VENOUS BLD VENIPUNCTURE: CPT | Performed by: STUDENT IN AN ORGANIZED HEALTH CARE EDUCATION/TRAINING PROGRAM

## 2022-02-07 PROCEDURE — 63600175 PHARM REV CODE 636 W HCPCS: Performed by: NURSE PRACTITIONER

## 2022-02-07 RX ORDER — FUROSEMIDE 10 MG/ML
40 INJECTION INTRAMUSCULAR; INTRAVENOUS
Status: DISCONTINUED | OUTPATIENT
Start: 2022-02-07 | End: 2022-02-07

## 2022-02-07 RX ORDER — METOLAZONE 5 MG/1
20 TABLET ORAL DAILY
Status: COMPLETED | OUTPATIENT
Start: 2022-02-07 | End: 2022-02-09

## 2022-02-07 RX ORDER — FUROSEMIDE 10 MG/ML
80 INJECTION INTRAMUSCULAR; INTRAVENOUS
Status: DISCONTINUED | OUTPATIENT
Start: 2022-02-07 | End: 2022-02-10 | Stop reason: HOSPADM

## 2022-02-07 RX ADMIN — HYDRALAZINE HYDROCHLORIDE 25 MG: 25 TABLET, FILM COATED ORAL at 09:02

## 2022-02-07 RX ADMIN — APIXABAN 5 MG: 5 TABLET, FILM COATED ORAL at 09:02

## 2022-02-07 RX ADMIN — METOLAZONE 20 MG: 5 TABLET ORAL at 02:02

## 2022-02-07 RX ADMIN — FUROSEMIDE 80 MG: 40 INJECTION, SOLUTION INTRAMUSCULAR; INTRAVENOUS at 09:02

## 2022-02-07 RX ADMIN — FUROSEMIDE 40 MG: 10 INJECTION, SOLUTION INTRAVENOUS at 09:02

## 2022-02-07 RX ADMIN — HYDRALAZINE HYDROCHLORIDE 25 MG: 25 TABLET, FILM COATED ORAL at 02:02

## 2022-02-07 RX ADMIN — TAMSULOSIN HYDROCHLORIDE 0.4 MG: 0.4 CAPSULE ORAL at 09:02

## 2022-02-07 RX ADMIN — METOPROLOL SUCCINATE 50 MG: 50 TABLET, EXTENDED RELEASE ORAL at 09:02

## 2022-02-07 RX ADMIN — INSULIN ASPART 1 UNITS: 100 INJECTION, SOLUTION INTRAVENOUS; SUBCUTANEOUS at 09:02

## 2022-02-07 RX ADMIN — ATORVASTATIN CALCIUM 20 MG: 10 TABLET, FILM COATED ORAL at 09:02

## 2022-02-07 RX ADMIN — AMLODIPINE BESYLATE 10 MG: 5 TABLET ORAL at 09:02

## 2022-02-07 RX ADMIN — ASPIRIN 81 MG CHEWABLE TABLET 81 MG: 81 TABLET CHEWABLE at 09:02

## 2022-02-07 RX ADMIN — AMIODARONE HYDROCHLORIDE 200 MG: 200 TABLET ORAL at 09:02

## 2022-02-07 RX ADMIN — FAMOTIDINE 20 MG: 20 TABLET, FILM COATED ORAL at 09:02

## 2022-02-07 RX ADMIN — POTASSIUM CHLORIDE 40 MEQ: 20 TABLET, EXTENDED RELEASE ORAL at 09:02

## 2022-02-07 RX ADMIN — ALBUTEROL SULFATE 2.5 MG: 2.5 SOLUTION RESPIRATORY (INHALATION) at 07:02

## 2022-02-07 RX ADMIN — CEFTRIAXONE 1 G: 1 INJECTION, SOLUTION INTRAVENOUS at 02:02

## 2022-02-07 RX ADMIN — HYDRALAZINE HYDROCHLORIDE 25 MG: 25 TABLET, FILM COATED ORAL at 05:02

## 2022-02-07 RX ADMIN — AZITHROMYCIN MONOHYDRATE 250 MG: 250 TABLET ORAL at 09:02

## 2022-02-07 RX ADMIN — Medication 800 MG: at 09:02

## 2022-02-07 NOTE — PLAN OF CARE
Problem: Adult Inpatient Plan of Care  Goal: Plan of Care Review  Outcome: Ongoing, Progressing     Problem: Adjustment to Illness (Sepsis/Septic Shock)  Goal: Optimal Coping  Outcome: Ongoing, Progressing     Problem: Bleeding (Sepsis/Septic Shock)  Goal: Absence of Bleeding  Outcome: Ongoing, Progressing     Problem: Glycemic Control Impaired (Sepsis/Septic Shock)  Goal: Blood Glucose Level Within Desired Range  Outcome: Ongoing, Progressing     Problem: Infection Progression (Sepsis/Septic Shock)  Goal: Absence of Infection Signs and Symptoms  Outcome: Ongoing, Progressing     Problem: Nutrition Impaired (Sepsis/Septic Shock)  Goal: Optimal Nutrition Intake  Outcome: Ongoing, Progressing     Problem: Fluid and Electrolyte Imbalance (Acute Kidney Injury/Impairment)  Goal: Fluid and Electrolyte Balance  Outcome: Ongoing, Progressing     Problem: Oral Intake Inadequate (Acute Kidney Injury/Impairment)  Goal: Optimal Nutrition Intake  Outcome: Ongoing, Progressing     Problem: Renal Function Impairment (Acute Kidney Injury/Impairment)  Goal: Effective Renal Function  Outcome: Ongoing, Progressing     Problem: Fluid Imbalance (Pneumonia)  Goal: Fluid Balance  Outcome: Ongoing, Progressing     Problem: Infection (Pneumonia)  Goal: Resolution of Infection Signs and Symptoms  Outcome: Ongoing, Progressing     Problem: Respiratory Compromise (Pneumonia)  Goal: Effective Oxygenation and Ventilation  Outcome: Ongoing, Progressing     Problem: Diabetes Comorbidity  Goal: Blood Glucose Level Within Targeted Range  Outcome: Ongoing, Progressing     Problem: Fall Injury Risk  Goal: Absence of Fall and Fall-Related Injury  Outcome: Ongoing, Progressing

## 2022-02-07 NOTE — ASSESSMENT & PLAN NOTE
A1c:   Lab Results   Component Value Date    HGBA1C 7.3 (H) 12/10/2021     Meds: SSI PRN to maintain goal 140-180  ADA diet, accuchecks ACHS, hypoglycemic protocol

## 2022-02-07 NOTE — ASSESSMENT & PLAN NOTE
Patient is identified as having Diastolic (HFpEF) heart failure that is Acute on chronic. CHF is currently uncontrolled due to Continued edema of extremities. Latest ECHO performed and demonstrates- Results for orders placed during the hospital encounter of 11/23/21    Echo    Interpretation Summary  · The left ventricle is normal in size with concentric hypertrophy and normal systolic function.  · The estimated ejection fraction is 55%.  · Left ventricular diastolic dysfunction.  · Mild right ventricular enlargement with normal right ventricular systolic function.  · Moderate left atrial enlargement.  · There is mild-to-moderate aortic valve stenosis.  · Aortic valve area is 1.40 cm2; peak velocity is 2.53 m/s; mean gradient is 17 mmHg.  · Mild aortic regurgitation.  · Mild mitral regurgitation.  · Moderate tricuspid regurgitation.  · Mild pulmonic regurgitation.  · Intermediate central venous pressure (8 mmHg).  · The estimated PA systolic pressure is 60 mmHg.  · There is pulmonary hypertension.  . Continue Furosemide and monitor clinical status closely. Monitor on telemetry..  Monitor strict Is&Os and daily weights.  Place on fluid restriction of 1.5 L. Continue to stress to patient importance of self efficacy and  on diet for CHF. Last BNP reviewed- and noted below   Recent Labs   Lab 02/05/22  0217   *   .

## 2022-02-07 NOTE — SUBJECTIVE & OBJECTIVE
Past Medical History:   Diagnosis Date    Acute congestive heart failure 3/20/2020    Alcohol abuse     Arthritis     Asthma attack 11/24/2021    Coronary artery disease     Diabetes mellitus     Hyperlipemia     Hypertension     Rhabdomyolysis        Past Surgical History:   Procedure Laterality Date    EYE SURGERY      TREATMENT OF CARDIAC ARRHYTHMIA N/A 12/7/2020    Procedure: Cardioversion or Defibrillation;  Surgeon: Indra Foote MD;  Location: [facility] CATH LAB;  Service: Cardiology;  Laterality: N/A;    TREATMENT OF CARDIAC ARRHYTHMIA N/A 12/30/2020    Procedure: Cardioversion or Defibrillation;  Surgeon: Tyrone Steen MD;  Location: [facility] CATH LAB;  Service: Cardiology;  Laterality: N/A;    VASCULAR SURGERY         Review of patient's allergies indicates:  No Known Allergies    No current facility-administered medications on file prior to encounter.     Current Outpatient Medications on File Prior to Encounter   Medication Sig    albuterol (PROVENTIL/VENTOLIN HFA) 90 mcg/actuation inhaler Inhale 2 puffs into the lungs every 4 (four) hours as needed.    amiodarone (PACERONE) 200 MG Tab Take 200 mg by mouth once daily.    amLODIPine (NORVASC) 10 MG tablet Take 10 mg by mouth once daily.    apixaban (ELIQUIS) 5 mg Tab Take 5 mg by mouth 2 (two) times daily.    aspirin 81 MG Chew Take 1 tablet (81 mg total) by mouth once daily.    DUREZOL 0.05 % Drop ophthalmic solution INT 1 GTT INTO OD FID FOR 3 WEEKS    famotidine (PEPCID) 40 MG tablet Take 40 mg by mouth once daily.    fluticasone-salmeterol diskus inhaler 100-50 mcg     furosemide (LASIX) 80 MG tablet Take 1 tablet (80 mg total) by mouth 2 (two) times a day.    hydrALAZINE (APRESOLINE) 25 MG tablet Take 1 tablet (25 mg total) by mouth every 8 (eight) hours.    insulin aspart U-100 (NOVOLOG) 100 unit/mL (3 mL) InPn pen Inject 5 Units into the skin 3 (three) times daily.    losartan-hydrochlorothiazide 100-25 mg (HYZAAR) 100-25  mg per tablet Take 1 tablet by mouth once daily.    magnesium oxide (MAG-OX) 400 mg (241.3 mg magnesium) tablet Take 800 mg by mouth.    metOLazone (ZAROXOLYN) 5 MG tablet TAKE 1 TABLET BY MOUTH 3 TIMES A WEEK AS NEEDED FOR SEVERE LEG SWELLING    metoprolol succinate (TOPROL-XL) 50 MG 24 hr tablet Take 50 mg by mouth once daily.    multivitamin Tab Take 1 tablet by mouth once daily.    NOVOLIN 70/30 U-100 INSULIN 100 unit/mL (70-30) injection     omeprazole (PRILOSEC) 20 MG capsule Take 20 mg by mouth once daily.    potassium chloride SA (K-DUR,KLOR-CON) 20 MEQ tablet Take 2 tablets by mouth once daily.    rosuvastatin (CRESTOR) 20 MG tablet Take 20 mg by mouth once daily.    spironolactone (ALDACTONE) 25 MG tablet     tamsulosin (FLOMAX) 0.4 mg Cap Take 1 capsule (0.4 mg total) by mouth once daily.     Family History     Problem Relation (Age of Onset)    Diabetes Mother, Father, Sister    Hypertension Mother, Father, Sister        Tobacco Use    Smoking status: Never Smoker    Smokeless tobacco: Never Used   Substance and Sexual Activity    Alcohol use: Not Currently     Alcohol/week: 4.0 standard drinks     Types: 4 Cans of beer per week    Drug use: No    Sexual activity: Yes     Partners: Female     Review of Systems   Constitutional: Negative.   HENT: Negative.    Eyes: Negative.    Cardiovascular: Positive for dyspnea on exertion and leg swelling.   Respiratory: Positive for shortness of breath.    Endocrine: Negative.    Hematologic/Lymphatic: Negative.    Skin: Negative.    Musculoskeletal: Negative.    Gastrointestinal: Negative.    Genitourinary: Negative.    Neurological: Negative.    Psychiatric/Behavioral: Negative.    Allergic/Immunologic: Negative.      Objective:     Vital Signs (Most Recent):  Temp: 98.3 °F (36.8 °C) (02/06/22 1945)  Pulse: 68 (02/06/22 1945)  Resp: 17 (02/06/22 1945)  BP: 126/72 (02/06/22 1945)  SpO2: 96 % (02/06/22 1945) Vital Signs (24h Range):  Temp:  [97.6 °F  (36.4 °C)-98.3 °F (36.8 °C)] 98.3 °F (36.8 °C)  Pulse:  [] 68  Resp:  [17-18] 17  SpO2:  [94 %-99 %] 96 %  BP: (124-164)/(63-92) 126/72     Weight: 104.3 kg (230 lb)  Body mass index is 32.08 kg/m².    SpO2: 96 %  O2 Device (Oxygen Therapy): room air      Intake/Output Summary (Last 24 hours) at 2/6/2022 2037  Last data filed at 2/6/2022 1200  Gross per 24 hour   Intake 480 ml   Output 1000 ml   Net -520 ml       Lines/Drains/Airways     Peripheral Intravenous Line                 Peripheral IV - Single Lumen 02/05/22 18 G Left Antecubital 1 day                Physical Exam  Vitals reviewed.   Constitutional:       Appearance: He is well-developed and well-nourished.   HENT:      Head: Normocephalic.   Eyes:      Conjunctiva/sclera: Conjunctivae normal.      Pupils: Pupils are equal, round, and reactive to light.   Cardiovascular:      Rate and Rhythm: Normal rate and regular rhythm.      Heart sounds: Normal heart sounds.   Pulmonary:      Effort: Pulmonary effort is normal.      Breath sounds: Normal breath sounds.   Abdominal:      General: Bowel sounds are normal.      Palpations: Abdomen is soft.   Musculoskeletal:      Cervical back: Normal range of motion and neck supple.      Right lower leg: Edema present.      Left lower leg: Edema present.   Skin:     General: Skin is warm.   Neurological:      Mental Status: He is alert and oriented to person, place, and time.         Significant Labs:   BMP:   Recent Labs   Lab 02/05/22 0217 02/05/22  1116 02/06/22  0959     --  150*     --  142   K 2.9* 3.2* 4.2     --  106   CO2 24  --  26   BUN 35*  --  39*   CREATININE 1.8*  --  2.0*   CALCIUM 9.6  --  10.3   MG 2.0  --   --    , CMP   Recent Labs   Lab 02/05/22 0217 02/05/22  1116 02/06/22  0959     --  142   K 2.9* 3.2* 4.2     --  106   CO2 24  --  26     --  150*   BUN 35*  --  39*   CREATININE 1.8*  --  2.0*   CALCIUM 9.6  --  10.3   PROT 7.4  --   --    ALBUMIN 4.0   --   --    BILITOT 0.6  --   --    ALKPHOS 78  --   --    AST 17  --   --    ALT 8*  --   --    ANIONGAP 14  --  10   ESTGFRAFRICA 43*  --  38*   EGFRNONAA 37*  --  33*   , CBC   Recent Labs   Lab 02/05/22  0217   WBC 8.86   HGB 12.2*   HCT 37.3*      , INR No results for input(s): INR, PROTIME in the last 48 hours., Lipid Panel No results for input(s): CHOL, HDL, LDLCALC, TRIG, CHOLHDL in the last 48 hours. and All pertinent lab results from the last 24 hours have been reviewed.    Significant Imaging: Echocardiogram:   Transthoracic echo (TTE) complete (Cupid Only):   Results for orders placed or performed during the hospital encounter of 11/23/21   Echo   Result Value Ref Range    BSA 2.25 m2    TDI SEPTAL 0.03 m/s    LV LATERAL E/E' RATIO 16.11 m/s    LV SEPTAL E/E' RATIO 48.33 m/s    LA WIDTH 4.51 cm    AORTIC VALVE CUSP SEPERATION 1.29 cm    TDI LATERAL 0.09 m/s    PV PEAK VELOCITY 1.14 cm/s    LVIDd 4.56 3.5 - 6.0 cm    IVS 1.65 (A) 0.6 - 1.1 cm    Posterior Wall 1.60 (A) 0.6 - 1.1 cm    Ao root annulus 3.58 cm    LVIDs 3.46 2.1 - 4.0 cm    FS 24 28 - 44 %    LA volume 125.12 cm3    Sinus 3.63 cm    STJ 2.92 cm    Ascending aorta 2.99 cm    LV mass 318.06 g    LA size 5.54 cm    RVDD 5.00 cm    TAPSE 2.18 cm    RV S' 7.78 cm/s    Left Ventricle Relative Wall Thickness 0.70 cm    AV mean gradient 17 mmHg    AV valve area 1.40 cm2    AV Velocity Ratio 0.33     AV index (prosthetic) 0.35     Mean e' 0.06 m/s    IVRT 117.65 msec    LVOT diameter 2.26 cm    LVOT area 4.0 cm2    LVOT peak david 0.83 m/s    LVOT peak VTI 18.46 cm    Ao peak david 2.53 m/s    Ao VTI 52.83 cm    LVOT stroke volume 74.01 cm3    AV peak gradient 26 mmHg    E/E' ratio 24.17 m/s    MV Peak E David 1.45 m/s    TR Max David 3.61 m/s    LV Systolic Volume 49.49 mL    LV Systolic Volume Index 22.4 mL/m2    LV Diastolic Volume 95.22 mL    LV Diastolic Volume Index 43.09 mL/m2    LA Volume Index 56.6 mL/m2    LV Mass Index 144 g/m2    RA Major  Axis 5.39 cm    Left Atrium Minor Axis 6.19 cm    Left Atrium Major Axis 5.62 cm    Triscuspid Valve Regurgitation Peak Gradient 52 mmHg    RA Width 4.31 cm    Right Atrial Pressure (from IVC) 8 mmHg    EF 55 %    TV rest pulmonary artery pressure 60 mmHg    Narrative    · The left ventricle is normal in size with concentric hypertrophy and   normal systolic function.  · The estimated ejection fraction is 55%.  · Left ventricular diastolic dysfunction.  · Mild right ventricular enlargement with normal right ventricular   systolic function.  · Moderate left atrial enlargement.  · There is mild-to-moderate aortic valve stenosis.  · Aortic valve area is 1.40 cm2; peak velocity is 2.53 m/s; mean gradient   is 17 mmHg.  · Mild aortic regurgitation.  · Mild mitral regurgitation.  · Moderate tricuspid regurgitation.  · Mild pulmonic regurgitation.  · Intermediate central venous pressure (8 mmHg).  · The estimated PA systolic pressure is 60 mmHg.  · There is pulmonary hypertension.

## 2022-02-07 NOTE — CONSULTS
"[facility]  Cardiology  Consult Note    Patient Name: Chato Bowie  MRN: [id number]  Admission Date: 2/5/2022  Hospital Length of Stay: 1 days  Code Status: Full Code   Attending Provider: Sea Phelan MD   Consulting Provider: Luisana Rodríguez MD  Primary Care Physician: [facility]  Principal Problem:Pneumonia due to infectious organism    Patient information was obtained from patient and ER records.     Inpatient consult to Cardiology  Consult performed by: Luisana Rodríguez MD  Consult ordered by: ELIZABETH Davis        Subjective:     Chief Complaint:  sob     HPI:   71 year old AAM with Diastolic heart failure (preserved LVEF 55% [11/21]), Aortic stenosis, and pulmonary HTN, who reports acute onset 2 day history of MACK and LE swelling. IV lasix in ED with minimal improvement. Covid rapid negative and BNP consistent with prior levels. He had CXR finding which shows, "bilateral pattern of patchy pulmonary infiltrates.  This is more prominent about the perihilar region and lung bases". He denies recent sick contact but acute SOB, difficulty breathing, conversing in/incomplete sentences stopping to breath between 2-3 words, and severe MACK.  mproving on azithromycin + ceftriaxone for CAP due to CXR findings; Due to symptomology obtain a COVID PCR ordered and pending. Supplemental oxygen for comfort - LE pitting edema consult to Cards for diuresis recommendation.      Cardiology has been consulted to help manage heart failur. Pt denies CP. SOB improving. Pedal edema improving.           Past Medical History:   Diagnosis Date    Acute congestive heart failure 3/20/2020    Alcohol abuse     Arthritis     Asthma attack 11/24/2021    Coronary artery disease     Diabetes mellitus     Hyperlipemia     Hypertension     Rhabdomyolysis        Past Surgical History:   Procedure Laterality Date    EYE SURGERY      TREATMENT OF CARDIAC ARRHYTHMIA N/A 12/7/2020    Procedure: Cardioversion or " Defibrillation;  Surgeon: Indra Foote MD;  Location: [facility] CATH LAB;  Service: Cardiology;  Laterality: N/A;    TREATMENT OF CARDIAC ARRHYTHMIA N/A 12/30/2020    Procedure: Cardioversion or Defibrillation;  Surgeon: Tyrone Steen MD;  Location: [facility] CATH LAB;  Service: Cardiology;  Laterality: N/A;    VASCULAR SURGERY         Review of patient's allergies indicates:  No Known Allergies    No current facility-administered medications on file prior to encounter.     Current Outpatient Medications on File Prior to Encounter   Medication Sig    albuterol (PROVENTIL/VENTOLIN HFA) 90 mcg/actuation inhaler Inhale 2 puffs into the lungs every 4 (four) hours as needed.    amiodarone (PACERONE) 200 MG Tab Take 200 mg by mouth once daily.    amLODIPine (NORVASC) 10 MG tablet Take 10 mg by mouth once daily.    apixaban (ELIQUIS) 5 mg Tab Take 5 mg by mouth 2 (two) times daily.    aspirin 81 MG Chew Take 1 tablet (81 mg total) by mouth once daily.    DUREZOL 0.05 % Drop ophthalmic solution INT 1 GTT INTO OD FID FOR 3 WEEKS    famotidine (PEPCID) 40 MG tablet Take 40 mg by mouth once daily.    fluticasone-salmeterol diskus inhaler 100-50 mcg     furosemide (LASIX) 80 MG tablet Take 1 tablet (80 mg total) by mouth 2 (two) times a day.    hydrALAZINE (APRESOLINE) 25 MG tablet Take 1 tablet (25 mg total) by mouth every 8 (eight) hours.    insulin aspart U-100 (NOVOLOG) 100 unit/mL (3 mL) InPn pen Inject 5 Units into the skin 3 (three) times daily.    losartan-hydrochlorothiazide 100-25 mg (HYZAAR) 100-25 mg per tablet Take 1 tablet by mouth once daily.    magnesium oxide (MAG-OX) 400 mg (241.3 mg magnesium) tablet Take 800 mg by mouth.    metOLazone (ZAROXOLYN) 5 MG tablet TAKE 1 TABLET BY MOUTH 3 TIMES A WEEK AS NEEDED FOR SEVERE LEG SWELLING    metoprolol succinate (TOPROL-XL) 50 MG 24 hr tablet Take 50 mg by mouth once daily.    multivitamin Tab Take 1 tablet by mouth once daily.    NOVOLIN 70/30  U-100 INSULIN 100 unit/mL (70-30) injection     omeprazole (PRILOSEC) 20 MG capsule Take 20 mg by mouth once daily.    potassium chloride SA (K-DUR,KLOR-CON) 20 MEQ tablet Take 2 tablets by mouth once daily.    rosuvastatin (CRESTOR) 20 MG tablet Take 20 mg by mouth once daily.    spironolactone (ALDACTONE) 25 MG tablet     tamsulosin (FLOMAX) 0.4 mg Cap Take 1 capsule (0.4 mg total) by mouth once daily.     Family History     Problem Relation (Age of Onset)    Diabetes Mother, Father, Sister    Hypertension Mother, Father, Sister        Tobacco Use    Smoking status: Never Smoker    Smokeless tobacco: Never Used   Substance and Sexual Activity    Alcohol use: Not Currently     Alcohol/week: 4.0 standard drinks     Types: 4 Cans of beer per week    Drug use: No    Sexual activity: Yes     Partners: Female     Review of Systems   Constitutional: Negative.   HENT: Negative.    Eyes: Negative.    Cardiovascular: Positive for dyspnea on exertion and leg swelling.   Respiratory: Positive for shortness of breath.    Endocrine: Negative.    Hematologic/Lymphatic: Negative.    Skin: Negative.    Musculoskeletal: Negative.    Gastrointestinal: Negative.    Genitourinary: Negative.    Neurological: Negative.    Psychiatric/Behavioral: Negative.    Allergic/Immunologic: Negative.      Objective:     Vital Signs (Most Recent):  Temp: 98.3 °F (36.8 °C) (02/06/22 1945)  Pulse: 68 (02/06/22 1945)  Resp: 17 (02/06/22 1945)  BP: 126/72 (02/06/22 1945)  SpO2: 96 % (02/06/22 1945) Vital Signs (24h Range):  Temp:  [97.6 °F (36.4 °C)-98.3 °F (36.8 °C)] 98.3 °F (36.8 °C)  Pulse:  [] 68  Resp:  [17-18] 17  SpO2:  [94 %-99 %] 96 %  BP: (124-164)/(63-92) 126/72     Weight: 104.3 kg (230 lb)  Body mass index is 32.08 kg/m².    SpO2: 96 %  O2 Device (Oxygen Therapy): room air      Intake/Output Summary (Last 24 hours) at 2/6/2022 2037  Last data filed at 2/6/2022 1200  Gross per 24 hour   Intake 480 ml   Output 1000 ml   Net  -520 ml       Lines/Drains/Airways     Peripheral Intravenous Line                 Peripheral IV - Single Lumen 02/05/22 18 G Left Antecubital 1 day                Physical Exam  Vitals reviewed.   Constitutional:       Appearance: He is well-developed and well-nourished.   HENT:      Head: Normocephalic.   Eyes:      Conjunctiva/sclera: Conjunctivae normal.      Pupils: Pupils are equal, round, and reactive to light.   Cardiovascular:      Rate and Rhythm: Normal rate and regular rhythm.      Heart sounds: Normal heart sounds.   Pulmonary:      Effort: Pulmonary effort is normal.      Breath sounds: Normal breath sounds.   Abdominal:      General: Bowel sounds are normal.      Palpations: Abdomen is soft.   Musculoskeletal:      Cervical back: Normal range of motion and neck supple.      Right lower leg: Edema present.      Left lower leg: Edema present.   Skin:     General: Skin is warm.   Neurological:      Mental Status: He is alert and oriented to person, place, and time.         Significant Labs:   BMP:   Recent Labs   Lab 02/05/22 0217 02/05/22  1116 02/06/22  0959     --  150*     --  142   K 2.9* 3.2* 4.2     --  106   CO2 24  --  26   BUN 35*  --  39*   CREATININE 1.8*  --  2.0*   CALCIUM 9.6  --  10.3   MG 2.0  --   --    , CMP   Recent Labs   Lab 02/05/22 0217 02/05/22  1116 02/06/22  0959     --  142   K 2.9* 3.2* 4.2     --  106   CO2 24  --  26     --  150*   BUN 35*  --  39*   CREATININE 1.8*  --  2.0*   CALCIUM 9.6  --  10.3   PROT 7.4  --   --    ALBUMIN 4.0  --   --    BILITOT 0.6  --   --    ALKPHOS 78  --   --    AST 17  --   --    ALT 8*  --   --    ANIONGAP 14  --  10   ESTGFRAFRICA 43*  --  38*   EGFRNONAA 37*  --  33*   , CBC   Recent Labs   Lab 02/05/22 0217   WBC 8.86   HGB 12.2*   HCT 37.3*      , INR No results for input(s): INR, PROTIME in the last 48 hours., Lipid Panel No results for input(s): CHOL, HDL, LDLCALC, TRIG, CHOLHDL in the  last 48 hours. and All pertinent lab results from the last 24 hours have been reviewed.    Significant Imaging: Echocardiogram:   Transthoracic echo (TTE) complete (Cupid Only):   Results for orders placed or performed during the hospital encounter of 11/23/21   Echo   Result Value Ref Range    BSA 2.25 m2    TDI SEPTAL 0.03 m/s    LV LATERAL E/E' RATIO 16.11 m/s    LV SEPTAL E/E' RATIO 48.33 m/s    LA WIDTH 4.51 cm    AORTIC VALVE CUSP SEPERATION 1.29 cm    TDI LATERAL 0.09 m/s    PV PEAK VELOCITY 1.14 cm/s    LVIDd 4.56 3.5 - 6.0 cm    IVS 1.65 (A) 0.6 - 1.1 cm    Posterior Wall 1.60 (A) 0.6 - 1.1 cm    Ao root annulus 3.58 cm    LVIDs 3.46 2.1 - 4.0 cm    FS 24 28 - 44 %    LA volume 125.12 cm3    Sinus 3.63 cm    STJ 2.92 cm    Ascending aorta 2.99 cm    LV mass 318.06 g    LA size 5.54 cm    RVDD 5.00 cm    TAPSE 2.18 cm    RV S' 7.78 cm/s    Left Ventricle Relative Wall Thickness 0.70 cm    AV mean gradient 17 mmHg    AV valve area 1.40 cm2    AV Velocity Ratio 0.33     AV index (prosthetic) 0.35     Mean e' 0.06 m/s    IVRT 117.65 msec    LVOT diameter 2.26 cm    LVOT area 4.0 cm2    LVOT peak david 0.83 m/s    LVOT peak VTI 18.46 cm    Ao peak david 2.53 m/s    Ao VTI 52.83 cm    LVOT stroke volume 74.01 cm3    AV peak gradient 26 mmHg    E/E' ratio 24.17 m/s    MV Peak E David 1.45 m/s    TR Max David 3.61 m/s    LV Systolic Volume 49.49 mL    LV Systolic Volume Index 22.4 mL/m2    LV Diastolic Volume 95.22 mL    LV Diastolic Volume Index 43.09 mL/m2    LA Volume Index 56.6 mL/m2    LV Mass Index 144 g/m2    RA Major Axis 5.39 cm    Left Atrium Minor Axis 6.19 cm    Left Atrium Major Axis 5.62 cm    Triscuspid Valve Regurgitation Peak Gradient 52 mmHg    RA Width 4.31 cm    Right Atrial Pressure (from IVC) 8 mmHg    EF 55 %    TV rest pulmonary artery pressure 60 mmHg    Narrative    · The left ventricle is normal in size with concentric hypertrophy and   normal systolic function.  · The estimated ejection fraction  is 55%.  · Left ventricular diastolic dysfunction.  · Mild right ventricular enlargement with normal right ventricular   systolic function.  · Moderate left atrial enlargement.  · There is mild-to-moderate aortic valve stenosis.  · Aortic valve area is 1.40 cm2; peak velocity is 2.53 m/s; mean gradient   is 17 mmHg.  · Mild aortic regurgitation.  · Mild mitral regurgitation.  · Moderate tricuspid regurgitation.  · Mild pulmonic regurgitation.  · Intermediate central venous pressure (8 mmHg).  · The estimated PA systolic pressure is 60 mmHg.  · There is pulmonary hypertension.        Assessment and Plan:     * Pneumonia due to infectious organism          Acute on chronic diastolic congestive heart failure  Patient is identified as having Diastolic (HFpEF) heart failure that is Acute on chronic. CHF is currently uncontrolled due to Continued edema of extremities. Latest ECHO performed and demonstrates- Results for orders placed during the hospital encounter of 11/23/21    Echo    Interpretation Summary  · The left ventricle is normal in size with concentric hypertrophy and normal systolic function.  · The estimated ejection fraction is 55%.  · Left ventricular diastolic dysfunction.  · Mild right ventricular enlargement with normal right ventricular systolic function.  · Moderate left atrial enlargement.  · There is mild-to-moderate aortic valve stenosis.  · Aortic valve area is 1.40 cm2; peak velocity is 2.53 m/s; mean gradient is 17 mmHg.  · Mild aortic regurgitation.  · Mild mitral regurgitation.  · Moderate tricuspid regurgitation.  · Mild pulmonic regurgitation.  · Intermediate central venous pressure (8 mmHg).  · The estimated PA systolic pressure is 60 mmHg.  · There is pulmonary hypertension.  . Continue Furosemide and monitor clinical status closely. Monitor on telemetry..  Monitor strict Is&Os and daily weights.  Place on fluid restriction of 1.5 L. Continue to stress to patient importance of self efficacy  and  on diet for CHF. Last BNP reviewed- and noted below   Recent Labs   Lab 02/05/22  0217   *   .      Paroxysmal atrial flutter  On eliquis    CKD (chronic kidney disease), stage III         Type 2 diabetes mellitus with kidney complication, with long-term current use of insulin        Hyperlipidemia  statins    Essential hypertension  Avoid nephrotoxic agents.    Acute renal failure  Creatinine was 3 two months ago, 1.8 --> 2 on this admission. Gentle diuresis with close eye on renal function.         VTE Risk Mitigation (From admission, onward)         Ordered     IP VTE HIGH RISK PATIENT  Once         02/05/22 1119     apixaban tablet 5 mg  2 times daily         02/05/22 0592                Thank you for your consult. I will follow-up with patient. Please contact us if you have any additional questions.    Luisana Rodríguez MD  Cardiology   [facility]

## 2022-02-07 NOTE — SUBJECTIVE & OBJECTIVE
Interval History:  Still has swelling of feet.      Past Medical History:   Diagnosis Date    Acute congestive heart failure 3/20/2020    Alcohol abuse     Arthritis     Asthma attack 11/24/2021    Coronary artery disease     Diabetes mellitus     Hyperlipemia     Hypertension     Rhabdomyolysis        Past Surgical History:   Procedure Laterality Date    EYE SURGERY      TREATMENT OF CARDIAC ARRHYTHMIA N/A 12/7/2020    Procedure: Cardioversion or Defibrillation;  Surgeon: Indra Foote MD;  Location: [facility] CATH LAB;  Service: Cardiology;  Laterality: N/A;    TREATMENT OF CARDIAC ARRHYTHMIA N/A 12/30/2020    Procedure: Cardioversion or Defibrillation;  Surgeon: Tyrone Steen MD;  Location: [facility] CATH LAB;  Service: Cardiology;  Laterality: N/A;    VASCULAR SURGERY         Review of patient's allergies indicates:  No Known Allergies    No current facility-administered medications on file prior to encounter.     Current Outpatient Medications on File Prior to Encounter   Medication Sig    albuterol (PROVENTIL/VENTOLIN HFA) 90 mcg/actuation inhaler Inhale 2 puffs into the lungs every 4 (four) hours as needed.    amiodarone (PACERONE) 200 MG Tab Take 200 mg by mouth once daily.    amLODIPine (NORVASC) 10 MG tablet Take 10 mg by mouth once daily.    apixaban (ELIQUIS) 5 mg Tab Take 5 mg by mouth 2 (two) times daily.    aspirin 81 MG Chew Take 1 tablet (81 mg total) by mouth once daily.    DUREZOL 0.05 % Drop ophthalmic solution INT 1 GTT INTO OD FID FOR 3 WEEKS    famotidine (PEPCID) 40 MG tablet Take 40 mg by mouth once daily.    fluticasone-salmeterol diskus inhaler 100-50 mcg     furosemide (LASIX) 80 MG tablet Take 1 tablet (80 mg total) by mouth 2 (two) times a day.    hydrALAZINE (APRESOLINE) 25 MG tablet Take 1 tablet (25 mg total) by mouth every 8 (eight) hours.    insulin aspart U-100 (NOVOLOG) 100 unit/mL (3 mL) InPn pen Inject 5 Units into the skin 3 (three) times daily.     losartan-hydrochlorothiazide 100-25 mg (HYZAAR) 100-25 mg per tablet Take 1 tablet by mouth once daily.    magnesium oxide (MAG-OX) 400 mg (241.3 mg magnesium) tablet Take 800 mg by mouth.    metOLazone (ZAROXOLYN) 5 MG tablet TAKE 1 TABLET BY MOUTH 3 TIMES A WEEK AS NEEDED FOR SEVERE LEG SWELLING    metoprolol succinate (TOPROL-XL) 50 MG 24 hr tablet Take 50 mg by mouth once daily.    multivitamin Tab Take 1 tablet by mouth once daily.    NOVOLIN 70/30 U-100 INSULIN 100 unit/mL (70-30) injection     omeprazole (PRILOSEC) 20 MG capsule Take 20 mg by mouth once daily.    potassium chloride SA (K-DUR,KLOR-CON) 20 MEQ tablet Take 2 tablets by mouth once daily.    rosuvastatin (CRESTOR) 20 MG tablet Take 20 mg by mouth once daily.    spironolactone (ALDACTONE) 25 MG tablet     tamsulosin (FLOMAX) 0.4 mg Cap Take 1 capsule (0.4 mg total) by mouth once daily.     Family History     Problem Relation (Age of Onset)    Diabetes Mother, Father, Sister    Hypertension Mother, Father, Sister        Tobacco Use    Smoking status: Never Smoker    Smokeless tobacco: Never Used   Substance and Sexual Activity    Alcohol use: Not Currently     Alcohol/week: 4.0 standard drinks     Types: 4 Cans of beer per week    Drug use: No    Sexual activity: Yes     Partners: Female     Review of Systems   Constitutional: Negative.   HENT: Negative.    Eyes: Negative.    Cardiovascular: Positive for dyspnea on exertion and leg swelling.   Respiratory: Positive for shortness of breath.    Endocrine: Negative.    Hematologic/Lymphatic: Negative.    Skin: Negative.    Musculoskeletal: Negative.    Gastrointestinal: Negative.    Genitourinary: Negative.    Neurological: Negative.    Psychiatric/Behavioral: Negative.    Allergic/Immunologic: Negative.      Objective:     Vital Signs (Most Recent):  Temp: 98 °F (36.7 °C) (02/07/22 1118)  Pulse: 82 (02/07/22 1118)  Resp: 18 (02/07/22 1118)  BP: (!) 151/99 (02/07/22 1118)  SpO2: 96 %  (02/07/22 1118) Vital Signs (24h Range):  Temp:  [97.7 °F (36.5 °C)-98.3 °F (36.8 °C)] 98 °F (36.7 °C)  Pulse:  [67-86] 82  Resp:  [17-18] 18  SpO2:  [94 %-99 %] 96 %  BP: (126-153)/(63-99) 151/99     Weight: 104.3 kg (230 lb)  Body mass index is 32.08 kg/m².    SpO2: 96 %  O2 Device (Oxygen Therapy): room air      Intake/Output Summary (Last 24 hours) at 2/7/2022 1411  Last data filed at 2/7/2022 1200  Gross per 24 hour   Intake --   Output 1375 ml   Net -1375 ml       Lines/Drains/Airways     Peripheral Intravenous Line                 Peripheral IV - Single Lumen 02/05/22 18 G Left Antecubital 2 days                Physical Exam  Vitals reviewed.   Constitutional:       Appearance: He is well-developed.   HENT:      Head: Normocephalic.   Eyes:      Conjunctiva/sclera: Conjunctivae normal.      Pupils: Pupils are equal, round, and reactive to light.   Cardiovascular:      Rate and Rhythm: Normal rate and regular rhythm.      Heart sounds: Normal heart sounds.   Pulmonary:      Effort: Pulmonary effort is normal.      Breath sounds: Normal breath sounds.   Abdominal:      General: Bowel sounds are normal.      Palpations: Abdomen is soft.   Musculoskeletal:      Cervical back: Normal range of motion and neck supple.      Right lower leg: Edema present.      Left lower leg: Edema present.   Skin:     General: Skin is warm.   Neurological:      Mental Status: He is alert and oriented to person, place, and time.         Significant Labs:   BMP:   Recent Labs   Lab 02/06/22  0959 02/07/22  0542   * 137*    141   K 4.2 3.9    106   CO2 26 25   BUN 39* 45*   CREATININE 2.0* 1.9*   CALCIUM 10.3 9.4   , CMP   Recent Labs   Lab 02/06/22  0959 02/07/22  0542    141   K 4.2 3.9    106   CO2 26 25   * 137*   BUN 39* 45*   CREATININE 2.0* 1.9*   CALCIUM 10.3 9.4   ANIONGAP 10 10   ESTGFRAFRICA 38* 40*   EGFRNONAA 33* 35*   , CBC   No results for input(s): WBC, HGB, HCT, PLT in the last 48  hours., INR No results for input(s): INR, PROTIME in the last 48 hours., Lipid Panel No results for input(s): CHOL, HDL, LDLCALC, TRIG, CHOLHDL in the last 48 hours. and All pertinent lab results from the last 24 hours have been reviewed.    Significant Imaging: Echocardiogram:   Transthoracic echo (TTE) complete (Cupid Only):   Results for orders placed or performed during the hospital encounter of 02/05/22   Echo   Result Value Ref Range    BSA 2.29 m2    Sinus 3.20 cm    AORTIC VALVE CUSP SEPERATION 1.11 cm    PV PEAK VELOCITY 1.02 cm/s    LVIDd 5.26 3.5 - 6.0 cm    IVS 0.97 0.6 - 1.1 cm    Posterior Wall 1.06 0.6 - 1.1 cm    Ao root annulus 3.50 cm    LVIDs 3.67 2.1 - 4.0 cm    FS 30 28 - 44 %    STJ 2.66 cm    Ascending aorta 3.50 cm    LV mass 201.85 g    LA size 5.59 cm    TAPSE 2.13 cm    Left Ventricle Relative Wall Thickness 0.40 cm    AV mean gradient 22 mmHg    AV valve area 1.30 cm2    AV Velocity Ratio 0.32     AV index (prosthetic) 0.29     IVRT 71.36 msec    LVOT diameter 2.38 cm    LVOT area 4.4 cm2    LVOT peak david 0.92 m/s    LVOT peak VTI 20.86 cm    Ao peak david 2.90 m/s    Ao VTI 71.18 cm    LVOT stroke volume 92.76 cm3    AV peak gradient 34 mmHg    MV Peak E David 1.52 m/s    TR Max David 3.34 m/s    LV Systolic Volume 57.12 mL    LV Systolic Volume Index 25.5 mL/m2    LV Diastolic Volume 132.77 mL    LV Diastolic Volume Index 59.27 mL/m2    LV Mass Index 90 g/m2    Triscuspid Valve Regurgitation Peak Gradient 45 mmHg

## 2022-02-07 NOTE — PROGRESS NOTES
"[facility] Medicine  Progress Note    Patient Name: Chato Bowie  MRN: [id number]  Patient Class: IP- Inpatient   Admission Date: 2/5/2022  Length of Stay: 2 days  Attending Physician: Tiffanie Rojas DO  Primary Care Provider: Irlanda Valenzuela        Subjective:     Principal Problem:Acute on chronic diastolic congestive heart failure        HPI:  71 year old male present to ED with c/o persistent shortness of breath onset 1 day ago. Patient also notes symptoms of leg swelling (onset "earlier today"), chills, chest pain, cough, chest tightness, and abdominal distension. Patient reports that he ran out of his prescribed Lasix 1 day ago, but otherwise endorses medication compliance. Patient has attempted Tx with Albuterol inhaler, but notes no alleviation. Patient also states that his chest pain is exacerbated by coughing. PMHx CHF, Asthma, CAD, DM, HTN, HLD.                   Overview/Hospital Course:  71 year old AAM with Diastolic heart failure (preserved LVEF 55% [11/21]), Aortic stenosis, and pulmonary HTN, who reports acute onset 2 day history of MACK and LE swelling. Admitted on 02/05/2022 for Acute on chronic diastolic congestive heart failure  IV lasix in ED with minimal improvement. Covid rapid negative and BNP consistent with prior levels. He had CXR finding which shows, "bilateral pattern of patchy pulmonary infiltrates.  This is more prominent about the perihilar region and lung bases".  Procalcitonin normal. Cardiology consulted and recommends continuing diuresis. Nephrolgy consulted for questionable ELIZABETH vs CKD. Patient was started on Rocephin/Azithromycin for CAP coverage on admission. Continue strict Is/Os and IV diuresis. Improving on azithromycin + ceftriaxone for CAP due to CXR findings; Due to symptomology obtain a COVID PCR ordered and pending. Wean O2 as tolerated       Interval History: No acute overnight events. Afebrile. Pt on room air. States his shortness of " breath is improving. Continues to have swelling in his legs but much improved.     Review of Systems   Constitutional: Negative for chills, fatigue and fever.   HENT: Negative for congestion, hearing loss, sore throat, tinnitus and trouble swallowing.    Eyes: Negative for photophobia and visual disturbance.   Respiratory: Negative for apnea, cough, wheezing and stridor.    Cardiovascular: Positive for leg swelling. Negative for chest pain and palpitations.   Gastrointestinal: Negative for abdominal pain, blood in stool, constipation, diarrhea and nausea.   Endocrine: Negative for polydipsia, polyphagia and polyuria.   Genitourinary: Negative for difficulty urinating, dysuria, flank pain and frequency.   Musculoskeletal: Negative for arthralgias, joint swelling and neck stiffness.   Skin: Negative for color change, rash and wound.   Neurological: Negative for dizziness, weakness, light-headedness and headaches.   Hematological: Negative for adenopathy.   Psychiatric/Behavioral: Negative for confusion, hallucinations and self-injury.     Objective:     Vital Signs (Most Recent):  Temp: 98 °F (36.7 °C) (02/07/22 1118)  Pulse: 82 (02/07/22 1118)  Resp: 18 (02/07/22 1118)  BP: (!) 151/99 (02/07/22 1118)  SpO2: 96 % (02/07/22 1118) Vital Signs (24h Range):  Temp:  [97.7 °F (36.5 °C)-98.3 °F (36.8 °C)] 98 °F (36.7 °C)  Pulse:  [67-86] 82  Resp:  [17-18] 18  SpO2:  [94 %-99 %] 96 %  BP: (126-153)/(63-99) 151/99     Weight: 104.3 kg (230 lb)  Body mass index is 32.08 kg/m².    Intake/Output Summary (Last 24 hours) at 2/7/2022 1127  Last data filed at 2/7/2022 0600  Gross per 24 hour   Intake 240 ml   Output 975 ml   Net -735 ml      Physical Exam  Constitutional:       General: He is not in acute distress.     Appearance: He is obese. He is not ill-appearing, toxic-appearing or diaphoretic.   HENT:      Head: Normocephalic and atraumatic.      Right Ear: External ear normal.      Left Ear: External ear normal.      Nose:  Nose normal.   Eyes:      Extraocular Movements: Extraocular movements intact.      Conjunctiva/sclera: Conjunctivae normal.   Cardiovascular:      Rate and Rhythm: Normal rate and regular rhythm.   Pulmonary:      Effort: Pulmonary effort is normal. No respiratory distress.      Breath sounds: No wheezing or rales.   Abdominal:      General: There is no distension.      Palpations: Abdomen is soft.      Tenderness: There is no abdominal tenderness. There is no guarding.      Comments: Large abdominal girth   Genitourinary:     Rectum: Guaiac result negative.   Musculoskeletal:         General: Swelling present. No tenderness. Normal range of motion.      Right lower leg: Edema (trace edema) present.      Left lower leg: Edema (trace edema) present.   Skin:     Findings: No erythema or rash.   Neurological:      General: No focal deficit present.      Mental Status: He is alert and oriented to person, place, and time.   Psychiatric:         Mood and Affect: Mood normal.         Thought Content: Thought content normal.         Significant Labs: All pertinent labs within the past 24 hours have been reviewed.    Significant Imaging: I have reviewed all pertinent imaging results/findings within the past 24 hours.      Assessment/Plan:      * Acute on chronic diastolic congestive heart failure  Heart failure 2/2 to Aortic Stenosis   Continue Fluid restriction, Strict I&Os, Daily weight  Fluid restrict 1.5L   Consult to cardiology- continue IV lasix  Echo from 11/2021 with EF 55%, diastolic dysfunction and aortic stenosis    Pneumonia due to infectious organism  CXR with infiltrates, pt with mild cough (now resolved) and shortness of breath  Patient improving with abx  Procal normal and no leukocytosis  Suspect likely more CHF than CAP  Will continue abx at this time, will likely dc if continues to improve with diuresis      Acute renal failure  Monitor renal indices  (Cr recently patient was high as 3.9)   Avoid any  nephrotoxin medication  Strict I and Os   Daily wgt  Nephrology consulted    Essential hypertension  Continue home meds  Held losartan due to elevated creatinine    CAD (coronary artery disease)  Resume home meds      Hyperlipidemia  Continue home statin    Hypokalemia  Replete as needed  Monitor  Resolved    CKD (chronic kidney disease), stage III  Worsening creatine after IV lasix on admission  - avoid nephrotoxins   - renally dose meds   - Follow labs   - nephrology consulted        Type 2 diabetes mellitus with kidney complication, with long-term current use of insulin  A1c:   Lab Results   Component Value Date    HGBA1C 7.3 (H) 12/10/2021     Meds: SSI PRN to maintain goal 140-180  ADA diet, accuchecks ACHS, hypoglycemic protocol      Paroxysmal atrial flutter  Continue home eliquis   continuous tele monitoring   ekg as needed      VTE Risk Mitigation (From admission, onward)         Ordered     IP VTE HIGH RISK PATIENT  Once         02/05/22 1119     apixaban tablet 5 mg  2 times daily         02/05/22 0538                Discharge Planning   ELY:      Code Status: Full Code   Is the patient medically ready for discharge?:     Reason for patient still in hospital (select all that apply): Patient trending condition, Treatment and Consult recommendations  Discharge Plan A: Home Health                  Tiffanie Rojas DO  Department of Hospital Medicine   [facility] - Tele[facility] [place]

## 2022-02-07 NOTE — SUBJECTIVE & OBJECTIVE
Interval History: No acute overnight events. Afebrile. Pt on room air. States his shortness of breath is improving. Continues to have swelling in his legs but much improved.     Review of Systems   Constitutional: Negative for chills, fatigue and fever.   HENT: Negative for congestion, hearing loss, sore throat, tinnitus and trouble swallowing.    Eyes: Negative for photophobia and visual disturbance.   Respiratory: Negative for apnea, cough, wheezing and stridor.    Cardiovascular: Positive for leg swelling. Negative for chest pain and palpitations.   Gastrointestinal: Negative for abdominal pain, blood in stool, constipation, diarrhea and nausea.   Endocrine: Negative for polydipsia, polyphagia and polyuria.   Genitourinary: Negative for difficulty urinating, dysuria, flank pain and frequency.   Musculoskeletal: Negative for arthralgias, joint swelling and neck stiffness.   Skin: Negative for color change, rash and wound.   Neurological: Negative for dizziness, weakness, light-headedness and headaches.   Hematological: Negative for adenopathy.   Psychiatric/Behavioral: Negative for confusion, hallucinations and self-injury.     Objective:     Vital Signs (Most Recent):  Temp: 98 °F (36.7 °C) (02/07/22 1118)  Pulse: 82 (02/07/22 1118)  Resp: 18 (02/07/22 1118)  BP: (!) 151/99 (02/07/22 1118)  SpO2: 96 % (02/07/22 1118) Vital Signs (24h Range):  Temp:  [97.7 °F (36.5 °C)-98.3 °F (36.8 °C)] 98 °F (36.7 °C)  Pulse:  [67-86] 82  Resp:  [17-18] 18  SpO2:  [94 %-99 %] 96 %  BP: (126-153)/(63-99) 151/99     Weight: 104.3 kg (230 lb)  Body mass index is 32.08 kg/m².    Intake/Output Summary (Last 24 hours) at 2/7/2022 1127  Last data filed at 2/7/2022 0600  Gross per 24 hour   Intake 240 ml   Output 975 ml   Net -735 ml      Physical Exam  Constitutional:       General: He is not in acute distress.     Appearance: He is obese. He is not ill-appearing, toxic-appearing or diaphoretic.   HENT:      Head: Normocephalic and  atraumatic.      Right Ear: External ear normal.      Left Ear: External ear normal.      Nose: Nose normal.   Eyes:      Extraocular Movements: Extraocular movements intact.      Conjunctiva/sclera: Conjunctivae normal.   Cardiovascular:      Rate and Rhythm: Normal rate and regular rhythm.   Pulmonary:      Effort: Pulmonary effort is normal. No respiratory distress.      Breath sounds: No wheezing or rales.   Abdominal:      General: There is no distension.      Palpations: Abdomen is soft.      Tenderness: There is no abdominal tenderness. There is no guarding.      Comments: Large abdominal girth   Genitourinary:     Rectum: Guaiac result negative.   Musculoskeletal:         General: Swelling present. No tenderness. Normal range of motion.      Right lower leg: Edema (trace edema) present.      Left lower leg: Edema (trace edema) present.   Skin:     Findings: No erythema or rash.   Neurological:      General: No focal deficit present.      Mental Status: He is alert and oriented to person, place, and time.   Psychiatric:         Mood and Affect: Mood normal.         Thought Content: Thought content normal.         Significant Labs: All pertinent labs within the past 24 hours have been reviewed.    Significant Imaging: I have reviewed all pertinent imaging results/findings within the past 24 hours.

## 2022-02-07 NOTE — CONSULTS
Renal Consult    Date of Admission:  2/5/2022  2:03 AM        Chief Complaint:   Chief Complaint   Patient presents with    Shortness of Breath     Pt SOB x2hrs. Home inhaler hasn't helped. + wheezing and labored breathing. Hx of CHF. Pt placed on CPAP with EMS. +BLE swelling. EMS gave ASA 324mg and x2 Nitro SL. Pt also with complaints of midline CP. Per EMS report pt initial sats were 92% on RA.     Chest Pain       HPI: 70 y/o male with a PMHx. Significant for: ELIZABETH (creat 4.6 in Dec when Hospitalized for Diastolic CHF w/preserved LVEF 55% and seen by me) ) CKD-3, CHF, Asthma, CAD, A-flutter, DM2, HTN, alcohol abuse, Cirrhosis of Liver and HLD came to the ED 2/5 with c/o persistent shortness of breath onset 1 day and symptoms of leg swelling, chills, chest pain, cough, chest tightness, and abdominal distension. Patient reported  that he ran out of his prescribed Lasix 1 day PTA.  Initial Labs. Creat 1.8, Bun 35 and K+ 3.2       PMH:  Past Medical History:   Diagnosis Date    Acute congestive heart failure 3/20/2020    Alcohol abuse     Arthritis     Asthma attack 11/24/2021    Coronary artery disease     Diabetes mellitus     Hyperlipemia     Hypertension     Rhabdomyolysis        PSH:  Past Surgical History:   Procedure Laterality Date    EYE SURGERY      TREATMENT OF CARDIAC ARRHYTHMIA N/A 12/7/2020    Procedure: Cardioversion or Defibrillation;  Surgeon: Indra Fotoe MD;  Location: [facility] CATH LAB;  Service: Cardiology;  Laterality: N/A;    TREATMENT OF CARDIAC ARRHYTHMIA N/A 12/30/2020    Procedure: Cardioversion or Defibrillation;  Surgeon: Tyrone Steen MD;  Location: [facility] CATH LAB;  Service: Cardiology;  Laterality: N/A;    VASCULAR SURGERY         Allergies:  Review of patient's allergies indicates:  No Known Allergies    No current facility-administered medications on file prior to encounter.     Current Outpatient Medications on File Prior to  Encounter   Medication Sig Dispense Refill    albuterol (PROVENTIL/VENTOLIN HFA) 90 mcg/actuation inhaler Inhale 2 puffs into the lungs every 4 (four) hours as needed. 8.5 g 5    amiodarone (PACERONE) 200 MG Tab Take 200 mg by mouth once daily.      amLODIPine (NORVASC) 10 MG tablet Take 10 mg by mouth once daily.      apixaban (ELIQUIS) 5 mg Tab Take 5 mg by mouth 2 (two) times daily.      aspirin 81 MG Chew Take 1 tablet (81 mg total) by mouth once daily.  0    DUREZOL 0.05 % Drop ophthalmic solution INT 1 GTT INTO OD FID FOR 3 WEEKS      famotidine (PEPCID) 40 MG tablet Take 40 mg by mouth once daily.      fluticasone-salmeterol diskus inhaler 100-50 mcg       furosemide (LASIX) 80 MG tablet Take 1 tablet (80 mg total) by mouth 2 (two) times a day. 60 tablet 5    hydrALAZINE (APRESOLINE) 25 MG tablet Take 1 tablet (25 mg total) by mouth every 8 (eight) hours. 90 tablet 5    insulin aspart U-100 (NOVOLOG) 100 unit/mL (3 mL) InPn pen Inject 5 Units into the skin 3 (three) times daily. 4.5 mL 11    losartan-hydrochlorothiazide 100-25 mg (HYZAAR) 100-25 mg per tablet Take 1 tablet by mouth once daily.      magnesium oxide (MAG-OX) 400 mg (241.3 mg magnesium) tablet Take 800 mg by mouth.      metOLazone (ZAROXOLYN) 5 MG tablet TAKE 1 TABLET BY MOUTH 3 TIMES A WEEK AS NEEDED FOR SEVERE LEG SWELLING      metoprolol succinate (TOPROL-XL) 50 MG 24 hr tablet Take 50 mg by mouth once daily.      multivitamin Tab Take 1 tablet by mouth once daily.      NOVOLIN 70/30 U-100 INSULIN 100 unit/mL (70-30) injection       omeprazole (PRILOSEC) 20 MG capsule Take 20 mg by mouth once daily.      potassium chloride SA (K-DUR,KLOR-CON) 20 MEQ tablet Take 2 tablets by mouth once daily.      rosuvastatin (CRESTOR) 20 MG tablet Take 20 mg by mouth once daily.      spironolactone (ALDACTONE) 25 MG tablet       tamsulosin (FLOMAX) 0.4 mg Cap Take 1 capsule (0.4 mg total) by mouth once daily. 30 capsule 11        Medications:  Current Facility-Administered Medications   Medication Dose Route Frequency Provider Last Rate Last Admin    acetaminophen tablet 650 mg  650 mg Oral Q6H PRN Manicia S. Emerson, NP        albuterol sulfate nebulizer solution 2.5 mg  2.5 mg Nebulization Q4H PRN Manicia S. Emerson, NP        amiodarone tablet 200 mg  200 mg Oral Daily Manicia S. Emerson, NP   200 mg at 02/06/22 0932    amLODIPine tablet 10 mg  10 mg Oral Daily Manicia S. Emerson, NP   10 mg at 02/06/22 0932    apixaban tablet 5 mg  5 mg Oral BID Manicia S. Emerson, NP   5 mg at 02/06/22 2127    aspirin chewable tablet 81 mg  81 mg Oral Daily Manicia S. Emerson, NP   81 mg at 02/06/22 0932    atorvastatin tablet 20 mg  20 mg Oral QHS Manicia S. Emerson, NP   20 mg at 02/06/22 2127    azithromycin tablet 250 mg  250 mg Oral Daily Lajake Thornton, FNP   250 mg at 02/06/22 0932    cefTRIAXone (ROCEPHIN) 1 g/50 mL D5W IVPB  1 g Intravenous Q24H La Thornton, FNP   Stopped at 02/06/22 1510    dextrose 10% bolus 125 mL  12.5 g Intravenous PRN Sea Phelan MD        dextrose 10% bolus 250 mL  25 g Intravenous PRN Sea Phelan MD        famotidine tablet 20 mg  20 mg Oral Daily Manicia S. Emerson, NP   20 mg at 02/06/22 0931    furosemide injection 40 mg  40 mg Intravenous Q12H [facility]        glucagon (human recombinant) injection 1 mg  1 mg Intramuscular PRN Manicia S. Emerson, NP        glucose chewable tablet 16 g  16 g Oral PRN Manicia S. Emerson, NP        glucose chewable tablet 24 g  24 g Oral PRN Manicia S. Emerson, NP        hydrALAZINE tablet 25 mg  25 mg Oral Q8H Manicia S. Emerson, NP   25 mg at 02/07/22 0541    insulin aspart U-100 pen 0-5 Units  0-5 Units Subcutaneous QID (AC + HS) PRN Manicia S. Emerson, NP   1 Units at 02/06/22 2127    magnesium oxide tablet 800 mg  800 mg Oral Daily Raimundo Norman, NP   800 mg at 02/06/22 0932    metoprolol succinate (TOPROL-XL) 24 hr tablet 50  mg  50 mg Oral Daily Manicia S. Emerson, NP   50 mg at 02/06/22 0931    ondansetron injection 4 mg  4 mg Intravenous Q6H PRN Manicia S. Emerson, NP        potassium chloride SA CR tablet 40 mEq  40 mEq Oral Daily Manicia S. Emerson, NP   40 mEq at 02/06/22 0932    sodium chloride 0.9% flush 10 mL  10 mL Intravenous PRN Jose Powers MD        tamsulosin 24 hr capsule 0.4 mg  0.4 mg Oral Daily Manicia S. Emerson, NP   0.4 mg at 02/06/22 0932       FamHx:  Family History   Problem Relation Age of Onset    Hypertension Mother     Diabetes Mother     Diabetes Father     Hypertension Father     Diabetes Sister     Hypertension Sister        SocHx:  Social History     Socioeconomic History    Marital status: Single   Tobacco Use    Smoking status: Never Smoker    Smokeless tobacco: Never Used   Substance and Sexual Activity    Alcohol use: Not Currently     Alcohol/week: 4.0 standard drinks     Types: 4 Cans of beer per week    Drug use: No    Sexual activity: Yes     Partners: Female           Review of Systems: see H&P       Physical Exam:  Vitals:   Vitals:    02/07/22 0756   BP:    Pulse: 79   Resp:    Temp:        I/O last 3 completed shifts:  In: 480 [P.O.:480]  Out: 1975 [Urine:1975]  No intake/output data recorded.    General: No apparent distress.   Neck: supple   Lungs: Unlabored breathing  Heart: RRR  Abdomen: n/a  Ext: n/a  Neurologic: awake      Laboratories:    No results for input(s): WBC, RBC, HGB, HCT, PLT, MCV, MCH, MCHC in the last 24 hours.    Recent Labs   Lab 02/07/22  0542   CALCIUM 9.4      K 3.9   CO2 25      BUN 45*   CREATININE 1.9*       No results for input(s): COLORU, CLARITYU, SPECGRAV, PHUR, PROTEINUA, GLUCOSEU, BLOODU, WBCU, RBCU, BACTERIA, MUCUS in the last 24 hours.    Invalid input(s):  BILIRUBINCON    Microbiology Results (last 7 days)     ** No results found for the last 168 hours. **            Diagnostic Tests:    CXR:    FINDINGS:   Single portable  chest view is submitted.  Mild diminished depth of inspiration noted.  The cardiomediastinal silhouette appears enlarged, stable appearance when compared to the prior exam.     There is central pulmonary vascular prominence.  There is bilateral pattern of patchy pulmonary infiltrates.  This is more prominent about the perihilar region and lung bases.     There is no evidence for pleural effusion and there is no evidence for pneumothorax.  The osseous structures demonstrate chronic change.    Impression:       Intrathoracic findings as above.       Electronically signed by: Naun Chang   Date: 02/05/2022          Assessment:       70 y/o male admitted with:     - Acute-chronic diastolic HF with Vascular congestion  - Anasarca  - PA-flutter  - CKD-3 (baseline 2s) creatinine currently around baseline for Pte.  - Chronically uncontrolled DM-2 w/ renal manifestations  - HTN  - Mild Anemia of chronic illness  - CAD  - HLD  - Hx. Alcohol abuse        Plan:    - Check urine P/C ratio r/o Nephrotic proteinuria  - Aggressive diuresis  - Replace K+ PRN  - Stopping Amlodipine due to Hx. LE edema  - Adjust Hydralazine and Metoprolol dose as needed  -- No ARBs or ACEI for now  - Other problems per admitting

## 2022-02-07 NOTE — ASSESSMENT & PLAN NOTE
CXR with infiltrates, pt with mild cough (now resolved) and shortness of breath  Patient improving with abx  Procal normal and no leukocytosis  Suspect likely more CHF than CAP  Will continue abx at this time, will likely dc if continues to improve with diuresis

## 2022-02-07 NOTE — ASSESSMENT & PLAN NOTE
Worsening creatine after IV lasix on admission  - avoid nephrotoxins   - renally dose meds   - Follow labs   - nephrology consulted

## 2022-02-07 NOTE — ASSESSMENT & PLAN NOTE
Monitor renal indices  (Cr recently patient was high as 3.9)   Avoid any nephrotoxin medication  Strict I and Os   Daily wgt  Nephrology consulted

## 2022-02-07 NOTE — PROGRESS NOTES
[facility]  Cardiology  Progress Note    Patient Name: Chato Bowie  MRN: [id number]  Admission Date: 2/5/2022  Hospital Length of Stay: 2 days  Code Status: Full Code   Attending Physician: Tiffanie Rojas DO   Primary Care Physician: Irlanda Valenzuela  Expected Discharge Date:   Principal Problem:Acute on chronic diastolic congestive heart failure    Subjective:       Interval History:  Still has swelling of feet.      Past Medical History:   Diagnosis Date    Acute congestive heart failure 3/20/2020    Alcohol abuse     Arthritis     Asthma attack 11/24/2021    Coronary artery disease     Diabetes mellitus     Hyperlipemia     Hypertension     Rhabdomyolysis        Past Surgical History:   Procedure Laterality Date    EYE SURGERY      TREATMENT OF CARDIAC ARRHYTHMIA N/A 12/7/2020    Procedure: Cardioversion or Defibrillation;  Surgeon: Indra Foote MD;  Location: [facility] CATH LAB;  Service: Cardiology;  Laterality: N/A;    TREATMENT OF CARDIAC ARRHYTHMIA N/A 12/30/2020    Procedure: Cardioversion or Defibrillation;  Surgeon: Tyrone Steen MD;  Location: [facility] CATH LAB;  Service: Cardiology;  Laterality: N/A;    VASCULAR SURGERY         Review of patient's allergies indicates:  No Known Allergies    No current facility-administered medications on file prior to encounter.     Current Outpatient Medications on File Prior to Encounter   Medication Sig    albuterol (PROVENTIL/VENTOLIN HFA) 90 mcg/actuation inhaler Inhale 2 puffs into the lungs every 4 (four) hours as needed.    amiodarone (PACERONE) 200 MG Tab Take 200 mg by mouth once daily.    amLODIPine (NORVASC) 10 MG tablet Take 10 mg by mouth once daily.    apixaban (ELIQUIS) 5 mg Tab Take 5 mg by mouth 2 (two) times daily.    aspirin 81 MG Chew Take 1 tablet (81 mg total) by mouth once daily.    DUREZOL 0.05 % Drop ophthalmic solution INT 1 GTT INTO OD FID FOR 3 WEEKS    famotidine (PEPCID) 40 MG tablet Take 40 mg by  mouth once daily.    fluticasone-salmeterol diskus inhaler 100-50 mcg     furosemide (LASIX) 80 MG tablet Take 1 tablet (80 mg total) by mouth 2 (two) times a day.    hydrALAZINE (APRESOLINE) 25 MG tablet Take 1 tablet (25 mg total) by mouth every 8 (eight) hours.    insulin aspart U-100 (NOVOLOG) 100 unit/mL (3 mL) InPn pen Inject 5 Units into the skin 3 (three) times daily.    losartan-hydrochlorothiazide 100-25 mg (HYZAAR) 100-25 mg per tablet Take 1 tablet by mouth once daily.    magnesium oxide (MAG-OX) 400 mg (241.3 mg magnesium) tablet Take 800 mg by mouth.    metOLazone (ZAROXOLYN) 5 MG tablet TAKE 1 TABLET BY MOUTH 3 TIMES A WEEK AS NEEDED FOR SEVERE LEG SWELLING    metoprolol succinate (TOPROL-XL) 50 MG 24 hr tablet Take 50 mg by mouth once daily.    multivitamin Tab Take 1 tablet by mouth once daily.    NOVOLIN 70/30 U-100 INSULIN 100 unit/mL (70-30) injection     omeprazole (PRILOSEC) 20 MG capsule Take 20 mg by mouth once daily.    potassium chloride SA (K-DUR,KLOR-CON) 20 MEQ tablet Take 2 tablets by mouth once daily.    rosuvastatin (CRESTOR) 20 MG tablet Take 20 mg by mouth once daily.    spironolactone (ALDACTONE) 25 MG tablet     tamsulosin (FLOMAX) 0.4 mg Cap Take 1 capsule (0.4 mg total) by mouth once daily.     Family History     Problem Relation (Age of Onset)    Diabetes Mother, Father, Sister    Hypertension Mother, Father, Sister        Tobacco Use    Smoking status: Never Smoker    Smokeless tobacco: Never Used   Substance and Sexual Activity    Alcohol use: Not Currently     Alcohol/week: 4.0 standard drinks     Types: 4 Cans of beer per week    Drug use: No    Sexual activity: Yes     Partners: Female     Review of Systems   Constitutional: Negative.   HENT: Negative.    Eyes: Negative.    Cardiovascular: Positive for dyspnea on exertion and leg swelling.   Respiratory: Positive for shortness of breath.    Endocrine: Negative.    Hematologic/Lymphatic: Negative.     Skin: Negative.    Musculoskeletal: Negative.    Gastrointestinal: Negative.    Genitourinary: Negative.    Neurological: Negative.    Psychiatric/Behavioral: Negative.    Allergic/Immunologic: Negative.      Objective:     Vital Signs (Most Recent):  Temp: 98 °F (36.7 °C) (02/07/22 1118)  Pulse: 82 (02/07/22 1118)  Resp: 18 (02/07/22 1118)  BP: (!) 151/99 (02/07/22 1118)  SpO2: 96 % (02/07/22 1118) Vital Signs (24h Range):  Temp:  [97.7 °F (36.5 °C)-98.3 °F (36.8 °C)] 98 °F (36.7 °C)  Pulse:  [67-86] 82  Resp:  [17-18] 18  SpO2:  [94 %-99 %] 96 %  BP: (126-153)/(63-99) 151/99     Weight: 104.3 kg (230 lb)  Body mass index is 32.08 kg/m².    SpO2: 96 %  O2 Device (Oxygen Therapy): room air      Intake/Output Summary (Last 24 hours) at 2/7/2022 1411  Last data filed at 2/7/2022 1200  Gross per 24 hour   Intake --   Output 1375 ml   Net -1375 ml       Lines/Drains/Airways     Peripheral Intravenous Line                 Peripheral IV - Single Lumen 02/05/22 18 G Left Antecubital 2 days                Physical Exam  Vitals reviewed.   Constitutional:       Appearance: He is well-developed.   HENT:      Head: Normocephalic.   Eyes:      Conjunctiva/sclera: Conjunctivae normal.      Pupils: Pupils are equal, round, and reactive to light.   Cardiovascular:      Rate and Rhythm: Normal rate and regular rhythm.      Heart sounds: Normal heart sounds.   Pulmonary:      Effort: Pulmonary effort is normal.      Breath sounds: Normal breath sounds.   Abdominal:      General: Bowel sounds are normal.      Palpations: Abdomen is soft.   Musculoskeletal:      Cervical back: Normal range of motion and neck supple.      Right lower leg: Edema present.      Left lower leg: Edema present.   Skin:     General: Skin is warm.   Neurological:      Mental Status: He is alert and oriented to person, place, and time.         Significant Labs:   BMP:   Recent Labs   Lab 02/06/22  0959 02/07/22  0542   * 137*    141   K 4.2 3.9     106   CO2 26 25   BUN 39* 45*   CREATININE 2.0* 1.9*   CALCIUM 10.3 9.4   , CMP   Recent Labs   Lab 02/06/22  0959 02/07/22  0542    141   K 4.2 3.9    106   CO2 26 25   * 137*   BUN 39* 45*   CREATININE 2.0* 1.9*   CALCIUM 10.3 9.4   ANIONGAP 10 10   ESTGFRAFRICA 38* 40*   EGFRNONAA 33* 35*   , CBC   No results for input(s): WBC, HGB, HCT, PLT in the last 48 hours., INR No results for input(s): INR, PROTIME in the last 48 hours., Lipid Panel No results for input(s): CHOL, HDL, LDLCALC, TRIG, CHOLHDL in the last 48 hours. and All pertinent lab results from the last 24 hours have been reviewed.    Significant Imaging: Echocardiogram:   Transthoracic echo (TTE) complete (Cupid Only):   Results for orders placed or performed during the hospital encounter of 02/05/22   Echo   Result Value Ref Range    BSA 2.29 m2    Sinus 3.20 cm    AORTIC VALVE CUSP SEPERATION 1.11 cm    PV PEAK VELOCITY 1.02 cm/s    LVIDd 5.26 3.5 - 6.0 cm    IVS 0.97 0.6 - 1.1 cm    Posterior Wall 1.06 0.6 - 1.1 cm    Ao root annulus 3.50 cm    LVIDs 3.67 2.1 - 4.0 cm    FS 30 28 - 44 %    STJ 2.66 cm    Ascending aorta 3.50 cm    LV mass 201.85 g    LA size 5.59 cm    TAPSE 2.13 cm    Left Ventricle Relative Wall Thickness 0.40 cm    AV mean gradient 22 mmHg    AV valve area 1.30 cm2    AV Velocity Ratio 0.32     AV index (prosthetic) 0.29     IVRT 71.36 msec    LVOT diameter 2.38 cm    LVOT area 4.4 cm2    LVOT peak david 0.92 m/s    LVOT peak VTI 20.86 cm    Ao peak david 2.90 m/s    Ao VTI 71.18 cm    LVOT stroke volume 92.76 cm3    AV peak gradient 34 mmHg    MV Peak E David 1.52 m/s    TR Max David 3.34 m/s    LV Systolic Volume 57.12 mL    LV Systolic Volume Index 25.5 mL/m2    LV Diastolic Volume 132.77 mL    LV Diastolic Volume Index 59.27 mL/m2    LV Mass Index 90 g/m2    Triscuspid Valve Regurgitation Peak Gradient 45 mmHg     Assessment and Plan:     Brief HPI:     * Acute on chronic diastolic congestive heart  failure  Patient is identified as having Diastolic (HFpEF) heart failure that is Acute on chronic. CHF is currently uncontrolled due to Continued edema of extremities. Latest ECHO performed and demonstrates- Results for orders placed during the hospital encounter of 11/23/21    Echo    Interpretation Summary  · The left ventricle is normal in size with concentric hypertrophy and normal systolic function.  · The estimated ejection fraction is 55%.  · Left ventricular diastolic dysfunction.  · Mild right ventricular enlargement with normal right ventricular systolic function.  · Moderate left atrial enlargement.  · There is mild-to-moderate aortic valve stenosis.  · Aortic valve area is 1.40 cm2; peak velocity is 2.53 m/s; mean gradient is 17 mmHg.  · Mild aortic regurgitation.  · Mild mitral regurgitation.  · Moderate tricuspid regurgitation.  · Mild pulmonic regurgitation.  · Intermediate central venous pressure (8 mmHg).  · The estimated PA systolic pressure is 60 mmHg.  · There is pulmonary hypertension.  . Continue Furosemide and monitor clinical status closely. Monitor on telemetry..  Monitor strict Is&Os and daily weights.  Place on fluid restriction of 1.5 L. Continue to stress to patient importance of self efficacy and  on diet for CHF. Last BNP reviewed- and noted below   Recent Labs   Lab 02/05/22  0217   *   .      Paroxysmal atrial flutter  On eliquis    Pneumonia due to infectious organism          CKD (chronic kidney disease), stage III         Type 2 diabetes mellitus with kidney complication, with long-term current use of insulin        Hyperlipidemia  statins    Essential hypertension  Avoid nephrotoxic agents.    Acute renal failure  Creatinine was 3 two months ago, 1.8 --> 2 --> 1.9 on this admission. Gentle diuresis with close eye on renal function.         VTE Risk Mitigation (From admission, onward)         Ordered     IP VTE HIGH RISK PATIENT  Once         02/05/22 2138      apixaban tablet 5 mg  2 times daily         02/05/22 0538                Luisana Rodríguez MD  Cardiology  [facility] [place] - Tele[facility] [place]

## 2022-02-07 NOTE — NURSING
Report received from night nurse JOYCE Curiel. Visualized patient and assessed patient's overall condition and appearance. No acute distress noted. Will continue to monitor

## 2022-02-07 NOTE — ASSESSMENT & PLAN NOTE
Creatinine was 3 two months ago, 1.8 --> 2 on this admission. Gentle diuresis with close eye on renal function.

## 2022-02-07 NOTE — HPI
"71 year old AAM with Diastolic heart failure (preserved LVEF 55% [11/21]), Aortic stenosis, and pulmonary HTN, who reports acute onset 2 day history of MACK and LE swelling. IV lasix in ED with minimal improvement. Covid rapid negative and BNP consistent with prior levels. He had CXR finding which shows, "bilateral pattern of patchy pulmonary infiltrates.  This is more prominent about the perihilar region and lung bases". He denies recent sick contact but acute SOB, difficulty breathing, conversing in/incomplete sentences stopping to breath between 2-3 words, and severe MACK.  mproving on azithromycin + ceftriaxone for CAP due to CXR findings; Due to symptomology obtain a COVID PCR ordered and pending. Supplemental oxygen for comfort - LE pitting edema consult to Cards for diuresis recommendation.      Cardiology has been consulted to help manage heart failur. Pt denies CP. SOB improving. Pedal edema improving.       "

## 2022-02-07 NOTE — ASSESSMENT & PLAN NOTE
Heart failure 2/2 to Aortic Stenosis   Continue Fluid restriction, Strict I&Os, Daily weight  Fluid restrict 1.5L   Consult to cardiology- continue IV lasix  Echo from 11/2021 with EF 55%, diastolic dysfunction and aortic stenosis

## 2022-02-07 NOTE — NURSING
Bedside Report given to night nurse JOYCE Curiel. Walking rounds completed. Visualized and assessed patient NAD noted. Safety precautions maintained and call light within reach.     Chart check completed.

## 2022-02-08 PROBLEM — R60.1 ANASARCA: Status: ACTIVE | Noted: 2022-02-08

## 2022-02-08 LAB
ANION GAP SERPL CALC-SCNC: 13 MMOL/L (ref 8–16)
BUN SERPL-MCNC: 46 MG/DL (ref 8–23)
CALCIUM SERPL-MCNC: 9.6 MG/DL (ref 8.7–10.5)
CHLORIDE SERPL-SCNC: 104 MMOL/L (ref 95–110)
CHOLEST SERPL-MCNC: 96 MG/DL (ref 120–199)
CHOLEST/HDLC SERPL: 3.6 {RATIO} (ref 2–5)
CO2 SERPL-SCNC: 24 MMOL/L (ref 23–29)
CREAT SERPL-MCNC: 2 MG/DL (ref 0.5–1.4)
CREAT UR-MCNC: 33.4 MG/DL (ref 23–375)
EST. GFR  (AFRICAN AMERICAN): 38 ML/MIN/1.73 M^2
EST. GFR  (NON AFRICAN AMERICAN): 33 ML/MIN/1.73 M^2
GLUCOSE SERPL-MCNC: 141 MG/DL (ref 70–110)
HDLC SERPL-MCNC: 27 MG/DL (ref 40–75)
HDLC SERPL: 28.1 % (ref 20–50)
LDLC SERPL CALC-MCNC: 55 MG/DL (ref 63–159)
NONHDLC SERPL-MCNC: 69 MG/DL
POCT GLUCOSE: 143 MG/DL (ref 70–110)
POCT GLUCOSE: 152 MG/DL (ref 70–110)
POCT GLUCOSE: 154 MG/DL (ref 70–110)
POCT GLUCOSE: 162 MG/DL (ref 70–110)
POCT GLUCOSE: 179 MG/DL (ref 70–110)
POCT GLUCOSE: 202 MG/DL (ref 70–110)
POTASSIUM SERPL-SCNC: 4.2 MMOL/L (ref 3.5–5.1)
PROT UR-MCNC: 42 MG/DL
PROT/CREAT UR: 1.26 MG/G{CREAT} (ref 0–0.2)
SODIUM SERPL-SCNC: 141 MMOL/L (ref 136–145)
TRIGL SERPL-MCNC: 70 MG/DL (ref 30–150)

## 2022-02-08 PROCEDURE — 80048 BASIC METABOLIC PNL TOTAL CA: CPT | Performed by: STUDENT IN AN ORGANIZED HEALTH CARE EDUCATION/TRAINING PROGRAM

## 2022-02-08 PROCEDURE — 36415 COLL VENOUS BLD VENIPUNCTURE: CPT | Performed by: STUDENT IN AN ORGANIZED HEALTH CARE EDUCATION/TRAINING PROGRAM

## 2022-02-08 PROCEDURE — 25000003 PHARM REV CODE 250: Performed by: NURSE PRACTITIONER

## 2022-02-08 PROCEDURE — 99233 PR SUBSEQUENT HOSPITAL CARE,LEVL III: ICD-10-PCS | Mod: ,,, | Performed by: INTERNAL MEDICINE

## 2022-02-08 PROCEDURE — 25000003 PHARM REV CODE 250: Performed by: INTERNAL MEDICINE

## 2022-02-08 PROCEDURE — 84156 ASSAY OF PROTEIN URINE: CPT | Performed by: INTERNAL MEDICINE

## 2022-02-08 PROCEDURE — 63700000 PHARM REV CODE 250 ALT 637 W/O HCPCS: Performed by: NURSE PRACTITIONER

## 2022-02-08 PROCEDURE — 63600175 PHARM REV CODE 636 W HCPCS: Performed by: NURSE PRACTITIONER

## 2022-02-08 PROCEDURE — 99233 SBSQ HOSP IP/OBS HIGH 50: CPT | Mod: ,,, | Performed by: INTERNAL MEDICINE

## 2022-02-08 PROCEDURE — 63600175 PHARM REV CODE 636 W HCPCS: Performed by: INTERNAL MEDICINE

## 2022-02-08 PROCEDURE — 21400001 HC TELEMETRY ROOM

## 2022-02-08 PROCEDURE — 80061 LIPID PANEL: CPT | Performed by: INTERNAL MEDICINE

## 2022-02-08 RX ADMIN — METOPROLOL SUCCINATE 50 MG: 50 TABLET, EXTENDED RELEASE ORAL at 08:02

## 2022-02-08 RX ADMIN — APIXABAN 5 MG: 5 TABLET, FILM COATED ORAL at 08:02

## 2022-02-08 RX ADMIN — HYDRALAZINE HYDROCHLORIDE 25 MG: 25 TABLET, FILM COATED ORAL at 08:02

## 2022-02-08 RX ADMIN — POTASSIUM CHLORIDE 40 MEQ: 20 TABLET, EXTENDED RELEASE ORAL at 08:02

## 2022-02-08 RX ADMIN — TAMSULOSIN HYDROCHLORIDE 0.4 MG: 0.4 CAPSULE ORAL at 08:02

## 2022-02-08 RX ADMIN — HYDRALAZINE HYDROCHLORIDE 25 MG: 25 TABLET, FILM COATED ORAL at 05:02

## 2022-02-08 RX ADMIN — CEFTRIAXONE 1 G: 1 INJECTION, SOLUTION INTRAVENOUS at 03:02

## 2022-02-08 RX ADMIN — ATORVASTATIN CALCIUM 20 MG: 10 TABLET, FILM COATED ORAL at 08:02

## 2022-02-08 RX ADMIN — METOLAZONE 20 MG: 5 TABLET ORAL at 08:02

## 2022-02-08 RX ADMIN — FUROSEMIDE 80 MG: 40 INJECTION, SOLUTION INTRAMUSCULAR; INTRAVENOUS at 08:02

## 2022-02-08 RX ADMIN — AZITHROMYCIN MONOHYDRATE 250 MG: 250 TABLET ORAL at 08:02

## 2022-02-08 RX ADMIN — HYDRALAZINE HYDROCHLORIDE 25 MG: 25 TABLET, FILM COATED ORAL at 03:02

## 2022-02-08 RX ADMIN — AMIODARONE HYDROCHLORIDE 200 MG: 200 TABLET ORAL at 08:02

## 2022-02-08 RX ADMIN — ASPIRIN 81 MG CHEWABLE TABLET 81 MG: 81 TABLET CHEWABLE at 08:02

## 2022-02-08 RX ADMIN — FAMOTIDINE 20 MG: 20 TABLET, FILM COATED ORAL at 08:02

## 2022-02-08 RX ADMIN — Medication 800 MG: at 08:02

## 2022-02-08 NOTE — SUBJECTIVE & OBJECTIVE
Interval History: No acute overnight events. Afebrile. Pt placed on 2L NC overnight for comfort. Will need to wean off. States his shortness of breath is improving. Continues to have swelling in his legs and feet. No calf tenderness. No chest pain.     Review of Systems   Constitutional: Negative for chills, fatigue and fever.   HENT: Negative for congestion and trouble swallowing.    Eyes: Negative for visual disturbance.   Respiratory: Negative for cough, wheezing and stridor.    Cardiovascular: Positive for leg swelling. Negative for chest pain and palpitations.   Gastrointestinal: Negative for abdominal pain, blood in stool, constipation, diarrhea and nausea.   Genitourinary: Negative for difficulty urinating, dysuria, flank pain, frequency and hematuria.   Musculoskeletal: Negative for arthralgias, joint swelling and neck stiffness.   Skin: Negative for color change, rash and wound.   Neurological: Negative for dizziness, syncope, weakness, light-headedness and headaches.   Hematological: Negative for adenopathy.   Psychiatric/Behavioral: Negative for confusion, hallucinations and self-injury.     Objective:     Vital Signs (Most Recent):  Temp: 98.7 °F (37.1 °C) (02/08/22 1206)  Pulse: 68 (02/08/22 1206)  Resp: 18 (02/08/22 1206)  BP: (!) 156/72 (02/08/22 1206)  SpO2: 99 % (02/08/22 1206) Vital Signs (24h Range):  Temp:  [96.5 °F (35.8 °C)-98.8 °F (37.1 °C)] 98.7 °F (37.1 °C)  Pulse:  [57-85] 68  Resp:  [18-20] 18  SpO2:  [94 %-100 %] 99 %  BP: (130-156)/(72-95) 156/72     Weight: 104.3 kg (230 lb)  Body mass index is 32.08 kg/m².    Intake/Output Summary (Last 24 hours) at 2/8/2022 1449  Last data filed at 2/8/2022 1027  Gross per 24 hour   Intake 240 ml   Output 3650 ml   Net -3410 ml      Physical Exam  Constitutional:       General: He is not in acute distress.     Appearance: He is obese. He is not ill-appearing, toxic-appearing or diaphoretic.   HENT:      Head: Normocephalic and atraumatic.      Right  Ear: External ear normal.      Left Ear: External ear normal.      Nose: Nose normal.   Eyes:      Extraocular Movements: Extraocular movements intact.      Conjunctiva/sclera: Conjunctivae normal.   Cardiovascular:      Rate and Rhythm: Normal rate and regular rhythm.   Pulmonary:      Effort: Pulmonary effort is normal. No respiratory distress.      Breath sounds: No wheezing or rales.      Comments: Mildly diminished in lower lung fields  Abdominal:      General: There is no distension.      Palpations: Abdomen is soft.      Tenderness: There is no abdominal tenderness. There is no guarding.      Comments: Large abdominal girth   Genitourinary:     Rectum: Guaiac result negative.   Musculoskeletal:         General: Swelling present. No tenderness. Normal range of motion.      Right lower leg: Edema (+3 pitting edema to foot and pretibial) present.      Left lower leg: Edema (+3 pitting edema to foot and pretibia) present.   Skin:     Findings: No erythema or rash.   Neurological:      General: No focal deficit present.      Mental Status: He is alert and oriented to person, place, and time.   Psychiatric:         Mood and Affect: Mood normal.         Thought Content: Thought content normal.         Significant Labs: All pertinent labs within the past 24 hours have been reviewed.    Significant Imaging: I have reviewed all pertinent imaging results/findings within the past 24 hours.

## 2022-02-08 NOTE — ASSESSMENT & PLAN NOTE
Monitor renal indices  (Cr recently patient was high as 3.9)   Cr at baseline  Avoid any nephrotoxin medication  Strict I and Os   Daily wgt  Nephrology consulted

## 2022-02-08 NOTE — PROGRESS NOTES
"[facility] Medicine  Progress Note    Patient Name: Chato Bowie  MRN: [id number]  Patient Class: IP- Inpatient   Admission Date: 2/5/2022  Length of Stay: 3 days  Attending Physician: Tiffanie Rojas DO  Primary Care Provider: Irlanda Valenzuela        Subjective:     Principal Problem:Acute on chronic diastolic congestive heart failure        HPI:  71 year old male present to ED with c/o persistent shortness of breath onset 1 day ago. Patient also notes symptoms of leg swelling (onset "earlier today"), chills, chest pain, cough, chest tightness, and abdominal distension. Patient reports that he ran out of his prescribed Lasix 1 day ago, but otherwise endorses medication compliance. Patient has attempted Tx with Albuterol inhaler, but notes no alleviation. Patient also states that his chest pain is exacerbated by coughing. PMHx CHF, Asthma, CAD, DM, HTN, HLD.                   Overview/Hospital Course:  71 year old AAM with Diastolic heart failure (preserved LVEF 55% [11/21]), Aortic stenosis, and pulmonary HTN, who reports acute onset 2 day history of MACK and LE swelling. Admitted on 02/05/2022 for Acute on chronic diastolic congestive heart failure  IV lasix in ED with minimal improvement. Covid rapid negative and BNP consistent with prior levels. He had CXR finding which shows, "bilateral pattern of patchy pulmonary infiltrates.  This is more prominent about the perihilar region and lung bases".  Procalcitonin normal. Cardiology consulted and recommends continuing diuresis. Nephrolgy consulted for questionable CKD. Patient was started on Rocephin/Azithromycin for CAP coverage on admission. Continue strict Is/Os and IV diuresis. Improving on azithromycin + ceftriaxone for CAP due to CXR findings;COVID PCR negative. Wean O2 as tolerated       Interval History: No acute overnight events. Afebrile. Pt placed on 2L NC overnight for comfort. Will need to wean off. States his shortness of " breath is improving. Continues to have swelling in his legs and feet. No calf tenderness. No chest pain.     Review of Systems   Constitutional: Negative for chills, fatigue and fever.   HENT: Negative for congestion and trouble swallowing.    Eyes: Negative for visual disturbance.   Respiratory: Negative for cough, wheezing and stridor.    Cardiovascular: Positive for leg swelling. Negative for chest pain and palpitations.   Gastrointestinal: Negative for abdominal pain, blood in stool, constipation, diarrhea and nausea.   Genitourinary: Negative for difficulty urinating, dysuria, flank pain, frequency and hematuria.   Musculoskeletal: Negative for arthralgias, joint swelling and neck stiffness.   Skin: Negative for color change, rash and wound.   Neurological: Negative for dizziness, syncope, weakness, light-headedness and headaches.   Hematological: Negative for adenopathy.   Psychiatric/Behavioral: Negative for confusion, hallucinations and self-injury.     Objective:     Vital Signs (Most Recent):  Temp: 98.7 °F (37.1 °C) (02/08/22 1206)  Pulse: 68 (02/08/22 1206)  Resp: 18 (02/08/22 1206)  BP: (!) 156/72 (02/08/22 1206)  SpO2: 99 % (02/08/22 1206) Vital Signs (24h Range):  Temp:  [96.5 °F (35.8 °C)-98.8 °F (37.1 °C)] 98.7 °F (37.1 °C)  Pulse:  [57-85] 68  Resp:  [18-20] 18  SpO2:  [94 %-100 %] 99 %  BP: (130-156)/(72-95) 156/72     Weight: 104.3 kg (230 lb)  Body mass index is 32.08 kg/m².    Intake/Output Summary (Last 24 hours) at 2/8/2022 1449  Last data filed at 2/8/2022 1027  Gross per 24 hour   Intake 240 ml   Output 3650 ml   Net -3410 ml      Physical Exam  Constitutional:       General: He is not in acute distress.     Appearance: He is obese. He is not ill-appearing, toxic-appearing or diaphoretic.   HENT:      Head: Normocephalic and atraumatic.      Right Ear: External ear normal.      Left Ear: External ear normal.      Nose: Nose normal.   Eyes:      Extraocular Movements: Extraocular movements  intact.      Conjunctiva/sclera: Conjunctivae normal.   Cardiovascular:      Rate and Rhythm: Normal rate and regular rhythm.   Pulmonary:      Effort: Pulmonary effort is normal. No respiratory distress.      Breath sounds: No wheezing or rales.      Comments: Mildly diminished in lower lung fields  Abdominal:      General: There is no distension.      Palpations: Abdomen is soft.      Tenderness: There is no abdominal tenderness. There is no guarding.      Comments: Large abdominal girth   Genitourinary:     Rectum: Guaiac result negative.   Musculoskeletal:         General: Swelling present. No tenderness. Normal range of motion.      Right lower leg: Edema (+3 pitting edema to foot and pretibial) present.      Left lower leg: Edema (+3 pitting edema to foot and pretibia) present.   Skin:     Findings: No erythema or rash.   Neurological:      General: No focal deficit present.      Mental Status: He is alert and oriented to person, place, and time.   Psychiatric:         Mood and Affect: Mood normal.         Thought Content: Thought content normal.         Significant Labs: All pertinent labs within the past 24 hours have been reviewed.    Significant Imaging: I have reviewed all pertinent imaging results/findings within the past 24 hours.      Assessment/Plan:      * Acute on chronic diastolic congestive heart failure  Heart failure 2/2 to Aortic Stenosis   Continue Fluid restriction, Strict I&Os, Daily weight  Fluid restrict 1.5L   Consult to cardiology- continue IV lasix. Will need to follow up in cards clinic  Echo from 11/2021 with EF 55%, diastolic dysfunction and aortic stenosis  Continue aggressive IV diuresis     Pneumonia due to infectious organism  CXR with infiltrates, pt with mild cough (now resolved) and shortness of breath  Patient improving with abx  Procal normal and no leukocytosis  Suspect likely more CHF than CAP  Will continue abx at this time, will likely dc tomorrow if continues to improve  with diuresis  On day 4 of azithromycin and Rocephin      Acute renal failure  Monitor renal indices  (Cr recently patient was high as 3.9)   Cr at baseline  Avoid any nephrotoxin medication  Strict I and Os   Daily wgt  Nephrology consulted    Essential hypertension  Continue home meds  Held losartan due to elevated creatinine    CAD (coronary artery disease)  Resume home meds      Hyperlipidemia  Continue home statin    Hypokalemia  Replete as needed  Monitor  Resolved    CKD (chronic kidney disease), stage III  Worsening creatine after IV lasix on admission  - avoid nephrotoxins   - renally dose meds   - Follow labs   - nephrology consulted        Type 2 diabetes mellitus with kidney complication, with long-term current use of insulin  A1c:   Lab Results   Component Value Date    HGBA1C 7.3 (H) 12/10/2021     Meds: SSI PRN to maintain goal 140-180  ADA diet, accuchecks ACHS, hypoglycemic protocol      Paroxysmal atrial flutter  Continue home eliquis   continuous tele monitoring   ekg as needed    Anasarca  Patient continues with swelling of bilateral LE  Continue aggressive IV diuresis  Will order US of bilateral LE        VTE Risk Mitigation (From admission, onward)         Ordered     IP VTE HIGH RISK PATIENT  Once         02/05/22 1119     apixaban tablet 5 mg  2 times daily         02/05/22 0538                Discharge Planning   ELY:      Code Status: Full Code   Is the patient medically ready for discharge?:     Reason for patient still in hospital (select all that apply): Patient trending condition, Treatment and Consult recommendations  Discharge Plan A: Home Health                  Tiffanie Rojas DO  Department of Hospital Medicine   [facility] [place] [facility] [place]

## 2022-02-08 NOTE — ASSESSMENT & PLAN NOTE
Patient continues with swelling of bilateral LE  Continue aggressive IV diuresis  Will order US of bilateral LE

## 2022-02-08 NOTE — PLAN OF CARE
02/08/22 1639   Medicare Message   Important Message from Medicare regarding Discharge Appeal Rights Explained to patient/caregiver;Other (comments)   Date IMM was signed 02/08/22   Time IMM was signed 1636   CM spoke with patient via video chat who verbalized understanding of appeal rights.  Copy will be sent via certified mail:  [phone].

## 2022-02-08 NOTE — SUBJECTIVE & OBJECTIVE
Interval History:  Breathing better.  Edema improving.      Interval History:  Still has swelling of feet.      Past Medical History:   Diagnosis Date    Acute congestive heart failure 3/20/2020    Alcohol abuse     Arthritis     Asthma attack 11/24/2021    Coronary artery disease     Diabetes mellitus     Hyperlipemia     Hypertension     Rhabdomyolysis        Past Surgical History:   Procedure Laterality Date    EYE SURGERY      TREATMENT OF CARDIAC ARRHYTHMIA N/A 12/7/2020    Procedure: Cardioversion or Defibrillation;  Surgeon: Indra Foote MD;  Location: [facility] CATH LAB;  Service: Cardiology;  Laterality: N/A;    TREATMENT OF CARDIAC ARRHYTHMIA N/A 12/30/2020    Procedure: Cardioversion or Defibrillation;  Surgeon: Tyrone Steen MD;  Location: [facility] CATH LAB;  Service: Cardiology;  Laterality: N/A;    VASCULAR SURGERY         Review of patient's allergies indicates:  No Known Allergies    No current facility-administered medications on file prior to encounter.     Current Outpatient Medications on File Prior to Encounter   Medication Sig    albuterol (PROVENTIL/VENTOLIN HFA) 90 mcg/actuation inhaler Inhale 2 puffs into the lungs every 4 (four) hours as needed.    amiodarone (PACERONE) 200 MG Tab Take 200 mg by mouth once daily.    amLODIPine (NORVASC) 10 MG tablet Take 10 mg by mouth once daily.    apixaban (ELIQUIS) 5 mg Tab Take 5 mg by mouth 2 (two) times daily.    aspirin 81 MG Chew Take 1 tablet (81 mg total) by mouth once daily.    DUREZOL 0.05 % Drop ophthalmic solution INT 1 GTT INTO OD FID FOR 3 WEEKS    famotidine (PEPCID) 40 MG tablet Take 40 mg by mouth once daily.    fluticasone-salmeterol diskus inhaler 100-50 mcg     furosemide (LASIX) 80 MG tablet Take 1 tablet (80 mg total) by mouth 2 (two) times a day.    hydrALAZINE (APRESOLINE) 25 MG tablet Take 1 tablet (25 mg total) by mouth every 8 (eight) hours.    insulin aspart U-100 (NOVOLOG) 100 unit/mL (3 mL) InPn pen  Inject 5 Units into the skin 3 (three) times daily.    losartan-hydrochlorothiazide 100-25 mg (HYZAAR) 100-25 mg per tablet Take 1 tablet by mouth once daily.    magnesium oxide (MAG-OX) 400 mg (241.3 mg magnesium) tablet Take 800 mg by mouth.    metOLazone (ZAROXOLYN) 5 MG tablet TAKE 1 TABLET BY MOUTH 3 TIMES A WEEK AS NEEDED FOR SEVERE LEG SWELLING    metoprolol succinate (TOPROL-XL) 50 MG 24 hr tablet Take 50 mg by mouth once daily.    multivitamin Tab Take 1 tablet by mouth once daily.    NOVOLIN 70/30 U-100 INSULIN 100 unit/mL (70-30) injection     omeprazole (PRILOSEC) 20 MG capsule Take 20 mg by mouth once daily.    potassium chloride SA (K-DUR,KLOR-CON) 20 MEQ tablet Take 2 tablets by mouth once daily.    rosuvastatin (CRESTOR) 20 MG tablet Take 20 mg by mouth once daily.    spironolactone (ALDACTONE) 25 MG tablet     tamsulosin (FLOMAX) 0.4 mg Cap Take 1 capsule (0.4 mg total) by mouth once daily.     Family History     Problem Relation (Age of Onset)    Diabetes Mother, Father, Sister    Hypertension Mother, Father, Sister        Tobacco Use    Smoking status: Never Smoker    Smokeless tobacco: Never Used   Substance and Sexual Activity    Alcohol use: Not Currently     Alcohol/week: 4.0 standard drinks     Types: 4 Cans of beer per week    Drug use: No    Sexual activity: Yes     Partners: Female     Review of Systems   Constitutional: Negative.   HENT: Negative.    Eyes: Negative.    Cardiovascular: Positive for dyspnea on exertion and leg swelling.   Respiratory: Positive for shortness of breath.    Endocrine: Negative.    Hematologic/Lymphatic: Negative.    Skin: Negative.    Musculoskeletal: Negative.    Gastrointestinal: Negative.    Genitourinary: Negative.    Neurological: Negative.    Psychiatric/Behavioral: Negative.    Allergic/Immunologic: Negative.      Objective:     Vital Signs (Most Recent):  Temp: 98.7 °F (37.1 °C) (02/08/22 1206)  Pulse: 68 (02/08/22 1206)  Resp: 18  (02/08/22 1206)  BP: (!) 156/72 (02/08/22 1206)  SpO2: 99 % (02/08/22 1206) Vital Signs (24h Range):  Temp:  [96.5 °F (35.8 °C)-98.8 °F (37.1 °C)] 98.7 °F (37.1 °C)  Pulse:  [57-85] 68  Resp:  [18-20] 18  SpO2:  [94 %-100 %] 99 %  BP: (129-156)/(71-95) 156/72     Weight: 104.3 kg (230 lb)  Body mass index is 32.08 kg/m².    SpO2: 99 %  O2 Device (Oxygen Therapy): room air      Intake/Output Summary (Last 24 hours) at 2/8/2022 1327  Last data filed at 2/8/2022 1027  Gross per 24 hour   Intake 240 ml   Output 3650 ml   Net -3410 ml       Lines/Drains/Airways     Peripheral Intravenous Line                 Peripheral IV - Single Lumen 02/05/22 18 G Left Antecubital 3 days                Physical Exam  Vitals reviewed.   Constitutional:       Appearance: He is well-developed.   HENT:      Head: Normocephalic.   Eyes:      Conjunctiva/sclera: Conjunctivae normal.      Pupils: Pupils are equal, round, and reactive to light.   Cardiovascular:      Rate and Rhythm: Normal rate and regular rhythm.      Heart sounds: Normal heart sounds.   Pulmonary:      Effort: Pulmonary effort is normal.      Breath sounds: Normal breath sounds.   Abdominal:      General: Bowel sounds are normal.      Palpations: Abdomen is soft.   Musculoskeletal:      Cervical back: Normal range of motion and neck supple.      Right lower leg: Edema present.      Left lower leg: Edema present.   Skin:     General: Skin is warm.   Neurological:      Mental Status: He is alert and oriented to person, place, and time.         Significant Labs:   BMP:   Recent Labs   Lab 02/07/22  0542 02/08/22  0639   * 141*    141   K 3.9 4.2    104   CO2 25 24   BUN 45* 46*   CREATININE 1.9* 2.0*   CALCIUM 9.4 9.6   , CMP   Recent Labs   Lab 02/07/22  0542 02/08/22  0639    141   K 3.9 4.2    104   CO2 25 24   * 141*   BUN 45* 46*   CREATININE 1.9* 2.0*   CALCIUM 9.4 9.6   ANIONGAP 10 13   ESTGFRAFRICA 40* 38*   EGFRNONAA 35* 33*   ,  CBC   No results for input(s): WBC, HGB, HCT, PLT in the last 48 hours., INR No results for input(s): INR, PROTIME in the last 48 hours., Lipid Panel   Recent Labs   Lab 02/08/22  0639   CHOL 96*   HDL 27*   LDLCALC 55.0*   TRIG 70   CHOLHDL 28.1    and All pertinent lab results from the last 24 hours have been reviewed.    Significant Imaging: Echocardiogram:   Transthoracic echo (TTE) complete (Cupid Only):   Results for orders placed or performed during the hospital encounter of 02/05/22   Echo   Result Value Ref Range    BSA 2.29 m2    Sinus 3.20 cm    AORTIC VALVE CUSP SEPERATION 1.11 cm    PV PEAK VELOCITY 1.02 cm/s    LVIDd 5.26 3.5 - 6.0 cm    IVS 0.97 0.6 - 1.1 cm    Posterior Wall 1.06 0.6 - 1.1 cm    Ao root annulus 3.50 cm    LVIDs 3.67 2.1 - 4.0 cm    FS 30 28 - 44 %    STJ 2.66 cm    Ascending aorta 3.50 cm    LV mass 201.85 g    LA size 5.59 cm    TAPSE 2.13 cm    Left Ventricle Relative Wall Thickness 0.40 cm    AV mean gradient 22 mmHg    AV valve area 1.30 cm2    AV Velocity Ratio 0.32     AV index (prosthetic) 0.29     IVRT 71.36 msec    LVOT diameter 2.38 cm    LVOT area 4.4 cm2    LVOT peak david 0.92 m/s    LVOT peak VTI 20.86 cm    Ao peak david 2.90 m/s    Ao VTI 71.18 cm    LVOT stroke volume 92.76 cm3    AV peak gradient 34 mmHg    MV Peak E David 1.52 m/s    TR Max David 3.34 m/s    LV Systolic Volume 57.12 mL    LV Systolic Volume Index 25.5 mL/m2    LV Diastolic Volume 132.77 mL    LV Diastolic Volume Index 59.27 mL/m2    LV Mass Index 90 g/m2    Triscuspid Valve Regurgitation Peak Gradient 45 mmHg    Right Atrial Pressure (from IVC) 8 mmHg    EF 55 %    TV rest pulmonary artery pressure 53 mmHg    Narrative    · The estimated ejection fraction is 55%.  · The left ventricle is normal in size with normal systolic function.  · Indeterminate left ventricular diastolic function.  · Mild aortic regurgitation.  · Mild to moderate tricuspid regurgitation.  · Normal right ventricular size with normal  right ventricular systolic   function.  · There is moderate aortic valve stenosis.  · Aortic valve area is 1.30 cm2; peak velocity is 2.90 m/s; mean gradient   is 22 mmHg.  · Moderate left atrial enlargement.  · Mild right atrial enlargement.  · Mild mitral regurgitation.  · The estimated PA systolic pressure is 53 mmHg.  · Intermediate central venous pressure (8 mmHg).  · There is pulmonary hypertension.

## 2022-02-08 NOTE — PROGRESS NOTES
Renal Progress Note    Date of Admission:  2/5/2022  2:03 AM    Length of Stay: 3  Days    Subjective: n/a    Objective:    Current Facility-Administered Medications   Medication    acetaminophen tablet 650 mg    albuterol sulfate nebulizer solution 2.5 mg    amiodarone tablet 200 mg    apixaban tablet 5 mg    aspirin chewable tablet 81 mg    atorvastatin tablet 20 mg    azithromycin tablet 250 mg    cefTRIAXone (ROCEPHIN) 1 g/50 mL D5W IVPB    dextrose 10% bolus 125 mL    dextrose 10% bolus 250 mL    famotidine tablet 20 mg    furosemide injection 80 mg    glucagon (human recombinant) injection 1 mg    glucose chewable tablet 16 g    glucose chewable tablet 24 g    hydrALAZINE tablet 25 mg    insulin aspart U-100 pen 0-5 Units    magnesium oxide tablet 800 mg    metOLazone tablet 20 mg    metoprolol succinate (TOPROL-XL) 24 hr tablet 50 mg    ondansetron injection 4 mg    potassium chloride SA CR tablet 40 mEq    sodium chloride 0.9% flush 10 mL    tamsulosin 24 hr capsule 0.4 mg       Vitals:    02/08/22 0007 02/08/22 0514 02/08/22 0550 02/08/22 0803   BP: 130/76 130/72  (!) 136/95   BP Location: Right arm Right arm  Right arm   Patient Position: Lying Lying  Sitting   Pulse: 69 (!) 57  81   Resp: 18 18  18   Temp: 97.3 °F (36.3 °C) 96.5 °F (35.8 °C)  98.8 °F (37.1 °C)   TempSrc: Oral Oral  Oral   SpO2: 98% 96% 100% 96%   Weight:       Height:           I/O last 3 completed shifts:  In: -   Out: 4200 [Urine:4200]  I/O this shift:  In: 240 [P.O.:240]  Out: 225 [Urine:225]      Physical Exam:     General: NAD  Neck: supple  Heart: RRR  Lungs: unlabored breathing  Abdomen: n/a  Limbs: +++ edema  Neurologic: Asleep      Laboratories:    No results for input(s): WBC, RBC, HGB, HCT, PLT, MCV, MCH, MCHC in the last 24 hours.    Recent Labs   Lab 02/08/22  0639   CALCIUM 9.6      K 4.2   CO2 24      BUN 46*   CREATININE 2.0*       No results for input(s): COLORU,  CLARITYU, SPECGRAV, PHUR, PROTEINUA, GLUCOSEU, BLOODU, WBCU, RBCU, BACTERIA, MUCUS in the last 24 hours.    Invalid input(s):  BILIRUBINCON    Microbiology Results (last 7 days)     ** No results found for the last 168 hours. **            Diagnostic Tests: n/a        Assessment:       70 y/o male admitted with:     - Acute-chronic diastolic HF with Vascular congestion  - Anasarca  - PA-flutter  - CKD-3 (baseline 2s) creatinine currently around baseline for Pte.  - Chronically uncontrolled DM-2 w/ renal manifestations  - Non-nephrotic proteinuria estimated at 1.2g/day  - HTN  - Mild Anemia of chronic illness  - CAD  - HLD  - Hx. Alcohol abuse           Plan:    - Aggressive diuresis  - Replace K+ PRN  - Adjust Hydralazine and Metoprolol dose as needed  - No ARBs or ACEI for now  - Check LE for DVT  - Other problems per admitting

## 2022-02-08 NOTE — PLAN OF CARE
Problem: Adult Inpatient Plan of Care  Goal: Plan of Care Review  2/8/2022 1638 by Connie Riggins RN  Outcome: Ongoing, Progressing     Problem: Glycemic Control Impaired (Sepsis/Septic Shock)  Goal: Blood Glucose Level Within Desired Range  Outcome: Ongoing, Progressing     Problem: Fluid and Electrolyte Imbalance (Acute Kidney Injury/Impairment)  Goal: Fluid and Electrolyte Balance  Outcome: Ongoing, Progressing     Problem: Diabetes Comorbidity  Goal: Blood Glucose Level Within Targeted Range  Outcome: Ongoing, Progressing

## 2022-02-08 NOTE — PLAN OF CARE
Problem: Adult Inpatient Plan of Care  Goal: Plan of Care Review  Outcome: Ongoing, Progressing     Problem: Adjustment to Illness (Sepsis/Septic Shock)  Goal: Optimal Coping  Outcome: Ongoing, Progressing     Problem: Bleeding (Sepsis/Septic Shock)  Goal: Absence of Bleeding  Outcome: Ongoing, Progressing     Problem: Glycemic Control Impaired (Sepsis/Septic Shock)  Goal: Blood Glucose Level Within Desired Range  Outcome: Ongoing, Progressing     Problem: Fluid and Electrolyte Imbalance (Acute Kidney Injury/Impairment)  Goal: Fluid and Electrolyte Balance  Outcome: Ongoing, Progressing     Problem: Oral Intake Inadequate (Acute Kidney Injury/Impairment)  Goal: Optimal Nutrition Intake  Outcome: Ongoing, Progressing     Problem: Renal Function Impairment (Acute Kidney Injury/Impairment)  Goal: Effective Renal Function  Outcome: Ongoing, Progressing     Problem: Infection (Pneumonia)  Goal: Resolution of Infection Signs and Symptoms  Outcome: Ongoing, Progressing     Problem: Respiratory Compromise (Pneumonia)  Goal: Effective Oxygenation and Ventilation  Outcome: Ongoing, Progressing     Problem: Diabetes Comorbidity  Goal: Blood Glucose Level Within Targeted Range  Outcome: Ongoing, Progressing     Problem: Fall Injury Risk  Goal: Absence of Fall and Fall-Related Injury  Outcome: Ongoing, Progressing

## 2022-02-08 NOTE — PROGRESS NOTES
[facility]  Cardiology  Progress Note    Patient Name: Chato Bowie  MRN: [id number]  Admission Date: 2/5/2022  Hospital Length of Stay: 3 days  Code Status: Full Code   Attending Physician: Tiffanie Rojas DO   Primary Care Physician: Irlanda Valenzuela  Expected Discharge Date:   Principal Problem:Acute on chronic diastolic congestive heart failure    Subjective:     Hospital Course:   No notes on file    Interval History:  Breathing better.  Edema improving.      Interval History:  Still has swelling of feet.      Past Medical History:   Diagnosis Date    Acute congestive heart failure 3/20/2020    Alcohol abuse     Arthritis     Asthma attack 11/24/2021    Coronary artery disease     Diabetes mellitus     Hyperlipemia     Hypertension     Rhabdomyolysis        Past Surgical History:   Procedure Laterality Date    EYE SURGERY      TREATMENT OF CARDIAC ARRHYTHMIA N/A 12/7/2020    Procedure: Cardioversion or Defibrillation;  Surgeon: Indra Foote MD;  Location: [facility] CATH LAB;  Service: Cardiology;  Laterality: N/A;    TREATMENT OF CARDIAC ARRHYTHMIA N/A 12/30/2020    Procedure: Cardioversion or Defibrillation;  Surgeon: Tyrone Steen MD;  Location: [facility] CATH LAB;  Service: Cardiology;  Laterality: N/A;    VASCULAR SURGERY         Review of patient's allergies indicates:  No Known Allergies    No current facility-administered medications on file prior to encounter.     Current Outpatient Medications on File Prior to Encounter   Medication Sig    albuterol (PROVENTIL/VENTOLIN HFA) 90 mcg/actuation inhaler Inhale 2 puffs into the lungs every 4 (four) hours as needed.    amiodarone (PACERONE) 200 MG Tab Take 200 mg by mouth once daily.    amLODIPine (NORVASC) 10 MG tablet Take 10 mg by mouth once daily.    apixaban (ELIQUIS) 5 mg Tab Take 5 mg by mouth 2 (two) times daily.    aspirin 81 MG Chew Take 1 tablet (81 mg total) by mouth once daily.    DUREZOL 0.05 % Drop  ophthalmic solution INT 1 GTT INTO OD FID FOR 3 WEEKS    famotidine (PEPCID) 40 MG tablet Take 40 mg by mouth once daily.    fluticasone-salmeterol diskus inhaler 100-50 mcg     furosemide (LASIX) 80 MG tablet Take 1 tablet (80 mg total) by mouth 2 (two) times a day.    hydrALAZINE (APRESOLINE) 25 MG tablet Take 1 tablet (25 mg total) by mouth every 8 (eight) hours.    insulin aspart U-100 (NOVOLOG) 100 unit/mL (3 mL) InPn pen Inject 5 Units into the skin 3 (three) times daily.    losartan-hydrochlorothiazide 100-25 mg (HYZAAR) 100-25 mg per tablet Take 1 tablet by mouth once daily.    magnesium oxide (MAG-OX) 400 mg (241.3 mg magnesium) tablet Take 800 mg by mouth.    metOLazone (ZAROXOLYN) 5 MG tablet TAKE 1 TABLET BY MOUTH 3 TIMES A WEEK AS NEEDED FOR SEVERE LEG SWELLING    metoprolol succinate (TOPROL-XL) 50 MG 24 hr tablet Take 50 mg by mouth once daily.    multivitamin Tab Take 1 tablet by mouth once daily.    NOVOLIN 70/30 U-100 INSULIN 100 unit/mL (70-30) injection     omeprazole (PRILOSEC) 20 MG capsule Take 20 mg by mouth once daily.    potassium chloride SA (K-DUR,KLOR-CON) 20 MEQ tablet Take 2 tablets by mouth once daily.    rosuvastatin (CRESTOR) 20 MG tablet Take 20 mg by mouth once daily.    spironolactone (ALDACTONE) 25 MG tablet     tamsulosin (FLOMAX) 0.4 mg Cap Take 1 capsule (0.4 mg total) by mouth once daily.     Family History     Problem Relation (Age of Onset)    Diabetes Mother, Father, Sister    Hypertension Mother, Father, Sister        Tobacco Use    Smoking status: Never Smoker    Smokeless tobacco: Never Used   Substance and Sexual Activity    Alcohol use: Not Currently     Alcohol/week: 4.0 standard drinks     Types: 4 Cans of beer per week    Drug use: No    Sexual activity: Yes     Partners: Female     Review of Systems   Constitutional: Negative.   HENT: Negative.    Eyes: Negative.    Endocrine: Negative.    Hematologic/Lymphatic: Negative.    Skin: Negative.     Musculoskeletal: Negative.    Gastrointestinal: Negative.    Genitourinary: Negative.    Neurological: Negative.    Psychiatric/Behavioral: Negative.    Allergic/Immunologic: Negative.      Objective:     Vital Signs (Most Recent):  Temp: 98.7 °F (37.1 °C) (02/08/22 1206)  Pulse: 68 (02/08/22 1206)  Resp: 18 (02/08/22 1206)  BP: (!) 156/72 (02/08/22 1206)  SpO2: 99 % (02/08/22 1206) Vital Signs (24h Range):  Temp:  [96.5 °F (35.8 °C)-98.8 °F (37.1 °C)] 98.7 °F (37.1 °C)  Pulse:  [57-85] 68  Resp:  [18-20] 18  SpO2:  [94 %-100 %] 99 %  BP: (129-156)/(71-95) 156/72     Weight: 104.3 kg (230 lb)  Body mass index is 32.08 kg/m².    SpO2: 99 %  O2 Device (Oxygen Therapy): room air      Intake/Output Summary (Last 24 hours) at 2/8/2022 1327  Last data filed at 2/8/2022 1027  Gross per 24 hour   Intake 240 ml   Output 3650 ml   Net -3410 ml       Lines/Drains/Airways     Peripheral Intravenous Line                 Peripheral IV - Single Lumen 02/05/22 18 G Left Antecubital 3 days                Physical Exam  Vitals reviewed.   Constitutional:       Appearance: He is well-developed.   HENT:      Head: Normocephalic.   Eyes:      Conjunctiva/sclera: Conjunctivae normal.      Pupils: Pupils are equal, round, and reactive to light.   Cardiovascular:      Rate and Rhythm: Normal rate and regular rhythm.      Heart sounds: Normal heart sounds.   Pulmonary:      Effort: Pulmonary effort is normal.      Breath sounds: Normal breath sounds.   Abdominal:      General: Bowel sounds are normal.      Palpations: Abdomen is soft.   Musculoskeletal:      Cervical back: Normal range of motion and neck supple.      Right lower leg: Edema present.      Left lower leg: Edema present.   Skin:     General: Skin is warm.   Neurological:      Mental Status: He is alert and oriented to person, place, and time.         Significant Labs:   BMP:   Recent Labs   Lab 02/07/22  0542 02/08/22  0639   * 141*    141   K 3.9 4.2     104   CO2 25 24   BUN 45* 46*   CREATININE 1.9* 2.0*   CALCIUM 9.4 9.6   , CMP   Recent Labs   Lab 02/07/22  0542 02/08/22  0639    141   K 3.9 4.2    104   CO2 25 24   * 141*   BUN 45* 46*   CREATININE 1.9* 2.0*   CALCIUM 9.4 9.6   ANIONGAP 10 13   ESTGFRAFRICA 40* 38*   EGFRNONAA 35* 33*   , CBC   No results for input(s): WBC, HGB, HCT, PLT in the last 48 hours., INR No results for input(s): INR, PROTIME in the last 48 hours., Lipid Panel   Recent Labs   Lab 02/08/22  0639   CHOL 96*   HDL 27*   LDLCALC 55.0*   TRIG 70   CHOLHDL 28.1    and All pertinent lab results from the last 24 hours have been reviewed.    Significant Imaging: Echocardiogram:   Transthoracic echo (TTE) complete (Cupid Only):   Results for orders placed or performed during the hospital encounter of 02/05/22   Echo   Result Value Ref Range    BSA 2.29 m2    Sinus 3.20 cm    AORTIC VALVE CUSP SEPERATION 1.11 cm    PV PEAK VELOCITY 1.02 cm/s    LVIDd 5.26 3.5 - 6.0 cm    IVS 0.97 0.6 - 1.1 cm    Posterior Wall 1.06 0.6 - 1.1 cm    Ao root annulus 3.50 cm    LVIDs 3.67 2.1 - 4.0 cm    FS 30 28 - 44 %    STJ 2.66 cm    Ascending aorta 3.50 cm    LV mass 201.85 g    LA size 5.59 cm    TAPSE 2.13 cm    Left Ventricle Relative Wall Thickness 0.40 cm    AV mean gradient 22 mmHg    AV valve area 1.30 cm2    AV Velocity Ratio 0.32     AV index (prosthetic) 0.29     IVRT 71.36 msec    LVOT diameter 2.38 cm    LVOT area 4.4 cm2    LVOT peak david 0.92 m/s    LVOT peak VTI 20.86 cm    Ao peak david 2.90 m/s    Ao VTI 71.18 cm    LVOT stroke volume 92.76 cm3    AV peak gradient 34 mmHg    MV Peak E David 1.52 m/s    TR Max David 3.34 m/s    LV Systolic Volume 57.12 mL    LV Systolic Volume Index 25.5 mL/m2    LV Diastolic Volume 132.77 mL    LV Diastolic Volume Index 59.27 mL/m2    LV Mass Index 90 g/m2    Triscuspid Valve Regurgitation Peak Gradient 45 mmHg    Right Atrial Pressure (from IVC) 8 mmHg    EF 55 %    TV rest pulmonary artery pressure  53 mmHg    Narrative    · The estimated ejection fraction is 55%.  · The left ventricle is normal in size with normal systolic function.  · Indeterminate left ventricular diastolic function.  · Mild aortic regurgitation.  · Mild to moderate tricuspid regurgitation.  · Normal right ventricular size with normal right ventricular systolic   function.  · There is moderate aortic valve stenosis.  · Aortic valve area is 1.30 cm2; peak velocity is 2.90 m/s; mean gradient   is 22 mmHg.  · Moderate left atrial enlargement.  · Mild right atrial enlargement.  · Mild mitral regurgitation.  · The estimated PA systolic pressure is 53 mmHg.  · Intermediate central venous pressure (8 mmHg).  · There is pulmonary hypertension.        Assessment and Plan:     Brief HPI:     * Acute on chronic diastolic congestive heart failure  Patient is identified as having Diastolic (HFpEF) heart failure that is Acute on chronic. CHF is currently uncontrolled due to Continued edema of extremities. Latest ECHO performed and demonstrates- Results for orders placed during the hospital encounter of 11/23/21    Echo    Interpretation Summary  · The left ventricle is normal in size with concentric hypertrophy and normal systolic function.  · The estimated ejection fraction is 55%.  · Left ventricular diastolic dysfunction.  · Mild right ventricular enlargement with normal right ventricular systolic function.  · Moderate left atrial enlargement.  · There is mild-to-moderate aortic valve stenosis.  · Aortic valve area is 1.40 cm2; peak velocity is 2.53 m/s; mean gradient is 17 mmHg.  · Mild aortic regurgitation.  · Mild mitral regurgitation.  · Moderate tricuspid regurgitation.  · Mild pulmonic regurgitation.  · Intermediate central venous pressure (8 mmHg).  · The estimated PA systolic pressure is 60 mmHg.  · There is pulmonary hypertension.  . Continue Furosemide and monitor clinical status closely. Monitor on telemetry..  Monitor strict Is&Os and daily  weights.  Place on fluid restriction of 1.5 L. Continue to stress to patient importance of self efficacy and  on diet for CHF. Last BNP reviewed- and noted below   Recent Labs   Lab 02/05/22  0217   *   .      Paroxysmal atrial flutter  On eliquis    Pneumonia due to infectious organism          CKD (chronic kidney disease), stage III         Type 2 diabetes mellitus with kidney complication, with long-term current use of insulin        Hyperlipidemia  statins    Essential hypertension  Avoid nephrotoxic agents.    Acute renal failure  Creatinine was 3 two months ago, 1.8 --> 2 on this admission. Gentle diuresis with close eye on renal function.       Will sign off..  Please re-consult as needed.  Follow-up in Cardiology Clinic.      VTE Risk Mitigation (From admission, onward)         Ordered     IP VTE HIGH RISK PATIENT  Once         02/05/22 1119     apixaban tablet 5 mg  2 times daily         02/05/22 0509                Luisana Rodríguez MD  Cardiology  [facility]

## 2022-02-08 NOTE — ASSESSMENT & PLAN NOTE
CXR with infiltrates, pt with mild cough (now resolved) and shortness of breath  Patient improving with abx  Procal normal and no leukocytosis  Suspect likely more CHF than CAP  Will continue abx at this time, will likely dc tomorrow if continues to improve with diuresis  On day 4 of azithromycin and Rocephin

## 2022-02-08 NOTE — ASSESSMENT & PLAN NOTE
Heart failure 2/2 to Aortic Stenosis   Continue Fluid restriction, Strict I&Os, Daily weight  Fluid restrict 1.5L   Consult to cardiology- continue IV lasix. Will need to follow up in [facility] clinic  Echo from 11/2021 with EF 55%, diastolic dysfunction and aortic stenosis  Continue aggressive IV diuresis

## 2022-02-09 LAB
ANION GAP SERPL CALC-SCNC: 11 MMOL/L (ref 8–16)
BUN SERPL-MCNC: 46 MG/DL (ref 8–23)
CALCIUM SERPL-MCNC: 9.7 MG/DL (ref 8.7–10.5)
CHLORIDE SERPL-SCNC: 102 MMOL/L (ref 95–110)
CO2 SERPL-SCNC: 30 MMOL/L (ref 23–29)
CREAT SERPL-MCNC: 2.2 MG/DL (ref 0.5–1.4)
EST. GFR  (AFRICAN AMERICAN): 34 ML/MIN/1.73 M^2
EST. GFR  (NON AFRICAN AMERICAN): 29 ML/MIN/1.73 M^2
GLUCOSE SERPL-MCNC: 151 MG/DL (ref 70–110)
POCT GLUCOSE: 139 MG/DL (ref 70–110)
POCT GLUCOSE: 205 MG/DL (ref 70–110)
POCT GLUCOSE: 208 MG/DL (ref 70–110)
POCT GLUCOSE: 236 MG/DL (ref 70–110)
POTASSIUM SERPL-SCNC: 3.7 MMOL/L (ref 3.5–5.1)
SODIUM SERPL-SCNC: 143 MMOL/L (ref 136–145)

## 2022-02-09 PROCEDURE — 80048 BASIC METABOLIC PNL TOTAL CA: CPT | Performed by: STUDENT IN AN ORGANIZED HEALTH CARE EDUCATION/TRAINING PROGRAM

## 2022-02-09 PROCEDURE — 63600175 PHARM REV CODE 636 W HCPCS: Performed by: INTERNAL MEDICINE

## 2022-02-09 PROCEDURE — 25000003 PHARM REV CODE 250: Performed by: NURSE PRACTITIONER

## 2022-02-09 PROCEDURE — 63600175 PHARM REV CODE 636 W HCPCS: Performed by: STUDENT IN AN ORGANIZED HEALTH CARE EDUCATION/TRAINING PROGRAM

## 2022-02-09 PROCEDURE — 25000003 PHARM REV CODE 250: Performed by: INTERNAL MEDICINE

## 2022-02-09 PROCEDURE — 63700000 PHARM REV CODE 250 ALT 637 W/O HCPCS: Performed by: STUDENT IN AN ORGANIZED HEALTH CARE EDUCATION/TRAINING PROGRAM

## 2022-02-09 PROCEDURE — 21400001 HC TELEMETRY ROOM

## 2022-02-09 PROCEDURE — 25000003 PHARM REV CODE 250: Performed by: STUDENT IN AN ORGANIZED HEALTH CARE EDUCATION/TRAINING PROGRAM

## 2022-02-09 PROCEDURE — 36415 COLL VENOUS BLD VENIPUNCTURE: CPT | Performed by: STUDENT IN AN ORGANIZED HEALTH CARE EDUCATION/TRAINING PROGRAM

## 2022-02-09 RX ORDER — HYDRALAZINE HYDROCHLORIDE 25 MG/1
50 TABLET, FILM COATED ORAL EVERY 8 HOURS
Status: DISCONTINUED | OUTPATIENT
Start: 2022-02-09 | End: 2022-02-10 | Stop reason: HOSPADM

## 2022-02-09 RX ADMIN — INSULIN ASPART 1 UNITS: 100 INJECTION, SOLUTION INTRAVENOUS; SUBCUTANEOUS at 08:02

## 2022-02-09 RX ADMIN — METOLAZONE 20 MG: 5 TABLET ORAL at 12:02

## 2022-02-09 RX ADMIN — FUROSEMIDE 80 MG: 40 INJECTION, SOLUTION INTRAMUSCULAR; INTRAVENOUS at 08:02

## 2022-02-09 RX ADMIN — FUROSEMIDE 80 MG: 40 INJECTION, SOLUTION INTRAMUSCULAR; INTRAVENOUS at 12:02

## 2022-02-09 RX ADMIN — CEFTRIAXONE 1 G: 1 INJECTION, SOLUTION INTRAVENOUS at 05:02

## 2022-02-09 RX ADMIN — FAMOTIDINE 20 MG: 20 TABLET, FILM COATED ORAL at 12:02

## 2022-02-09 RX ADMIN — TAMSULOSIN HYDROCHLORIDE 0.4 MG: 0.4 CAPSULE ORAL at 12:02

## 2022-02-09 RX ADMIN — HYDRALAZINE HYDROCHLORIDE 25 MG: 25 TABLET, FILM COATED ORAL at 05:02

## 2022-02-09 RX ADMIN — AMIODARONE HYDROCHLORIDE 200 MG: 200 TABLET ORAL at 12:02

## 2022-02-09 RX ADMIN — FUROSEMIDE 80 MG: 40 INJECTION, SOLUTION INTRAMUSCULAR; INTRAVENOUS at 09:02

## 2022-02-09 RX ADMIN — ATORVASTATIN CALCIUM 20 MG: 10 TABLET, FILM COATED ORAL at 08:02

## 2022-02-09 RX ADMIN — APIXABAN 5 MG: 5 TABLET, FILM COATED ORAL at 08:02

## 2022-02-09 RX ADMIN — AZITHROMYCIN MONOHYDRATE 250 MG: 250 TABLET ORAL at 12:02

## 2022-02-09 RX ADMIN — ASPIRIN 81 MG CHEWABLE TABLET 81 MG: 81 TABLET CHEWABLE at 12:02

## 2022-02-09 RX ADMIN — POTASSIUM CHLORIDE 40 MEQ: 20 TABLET, EXTENDED RELEASE ORAL at 12:02

## 2022-02-09 RX ADMIN — Medication 800 MG: at 12:02

## 2022-02-09 RX ADMIN — APIXABAN 5 MG: 5 TABLET, FILM COATED ORAL at 12:02

## 2022-02-09 RX ADMIN — HYDRALAZINE HYDROCHLORIDE 50 MG: 25 TABLET, FILM COATED ORAL at 10:02

## 2022-02-09 RX ADMIN — METOPROLOL SUCCINATE 50 MG: 50 TABLET, EXTENDED RELEASE ORAL at 12:02

## 2022-02-09 NOTE — PROGRESS NOTES
Renal Progress Note    Date of Admission:  2/5/2022  2:03 AM    Length of Stay: 4  Days    Subjective: n/a    Objective:    Current Facility-Administered Medications   Medication    acetaminophen tablet 650 mg    albuterol sulfate nebulizer solution 2.5 mg    amiodarone tablet 200 mg    apixaban tablet 5 mg    aspirin chewable tablet 81 mg    atorvastatin tablet 20 mg    azithromycin tablet 250 mg    cefTRIAXone (ROCEPHIN) 1 g/50 mL D5W IVPB    dextrose 10% bolus 125 mL    dextrose 10% bolus 250 mL    famotidine tablet 20 mg    furosemide injection 80 mg    glucagon (human recombinant) injection 1 mg    glucose chewable tablet 16 g    glucose chewable tablet 24 g    hydrALAZINE tablet 25 mg    insulin aspart U-100 pen 0-5 Units    magnesium oxide tablet 800 mg    metOLazone tablet 20 mg    metoprolol succinate (TOPROL-XL) 24 hr tablet 50 mg    ondansetron injection 4 mg    potassium chloride SA CR tablet 40 mEq    sodium chloride 0.9% flush 10 mL    tamsulosin 24 hr capsule 0.4 mg       Vitals:    02/08/22 2008 02/09/22 0123 02/09/22 0415 02/09/22 0758   BP: (!) 154/76 (!) 165/74 (!) 147/71 (!) 148/74   BP Location: Right arm Right arm Right arm Right arm   Patient Position: Lying Lying Lying Lying   Pulse: 60 69 70 72   Resp: 18 19 19 18   Temp: 98.3 °F (36.8 °C) 98.4 °F (36.9 °C) 98.5 °F (36.9 °C) 97.8 °F (36.6 °C)   TempSrc: Oral Oral Oral Oral   SpO2: 96% (!) 92% (!) 94% 95%   Weight:       Height:           I/O last 3 completed shifts:  In: 480 [P.O.:480]  Out: 5625 [Urine:5625]  No intake/output data recorded.      Physical Exam:     General: NAD  Neck: supple  Heart: RRR  Lungs: unlabored breathing  Abdomen: n/a  Limbs: +++ edema  Neurologic: asleep      Laboratories:    No results for input(s): WBC, RBC, HGB, HCT, PLT, MCV, MCH, MCHC in the last 24 hours.    Recent Labs   Lab 02/09/22  0442   CALCIUM 9.7      K 3.7   CO2 30*      BUN 46*    CREATININE 2.2*       No results for input(s): COLORU, CLARITYU, SPECGRAV, PHUR, PROTEINUA, GLUCOSEU, BLOODU, WBCU, RBCU, BACTERIA, MUCUS in the last 24 hours.    Invalid input(s):  BILIRUBINCON    Microbiology Results (last 7 days)     ** No results found for the last 168 hours. **            Diagnostic Tests:     US Lower Extremity Veins Bilateral     FINDINGS:   No evidence of clot involving the bilateral common femoral, greater saphenous, femoral, popliteal, peroneal, anterior and posterior tibial veins.  All venous structures demonstrate normal respiratory phasicity and augment adequately.  No evidence of soft tissue mass or Baker's cyst.    Impression:       No evidence of lower extremity deep venous thrombosis.       Electronically signed by: Mine Dasilva MD   Date: 02/08/2022            Assessment:       70 y/o male admitted with:     - Acute-chronic diastolic HF with Vascular congestion  - Anasarca  - PA-flutter  - CKD-3 (baseline 2s) creatinine currently around baseline for Pte.  - Chronically uncontrolled DM-2 w/ renal manifestations  - Non-nephrotic proteinuria estimated at 1.2g/day  - HTN  - Mild Anemia of chronic illness  - CAD  - HLD  - Hx. Alcohol abuse           Plan:    - Aggressive diuresis  - Replace K+ PRN  - Adjust Hydralazine and Metoprolol dose as needed  - No ARBs or ACEI for now  - DVT prophylaxis and other problems per admitting

## 2022-02-09 NOTE — PLAN OF CARE
Problem: Adult Inpatient Plan of Care  Goal: Plan of Care Review  Outcome: Ongoing, Progressing  Flowsheets (Taken 2/9/2022 0444)  Plan of Care Reviewed With: patient  Goal: Absence of Hospital-Acquired Illness or Injury  Outcome: Ongoing, Progressing  Goal: Optimal Comfort and Wellbeing  Outcome: Ongoing, Progressing     Problem: Diabetes Comorbidity  Goal: Blood Glucose Level Within Targeted Range  Outcome: Ongoing, Progressing     Problem: Fall Injury Risk  Goal: Absence of Fall and Fall-Related Injury  Outcome: Ongoing, Progressing  Plan of care reviewed with pt. Pt voiced understanding. Pt AAO X 4. Pt denies any c/o during the shift. No apparent distress noted. Fall precautions maintained. Pt remains free of fall or injury. Bed in lowest position, locked, and call light within reach. Side rails up x's 2 with slip resistant socks on.

## 2022-02-09 NOTE — PROGRESS NOTES
"[facility] Medicine  Progress Note    Patient Name: Chato Bowie  MRN: [id number]  Patient Class: IP- Inpatient   Admission Date: 2/5/2022  Length of Stay: 4 days  Attending Physician: Tiffanie Rojas DO  Primary Care Provider: Irlanda Valenzuela        Subjective:     Principal Problem:Acute on chronic diastolic congestive heart failure        HPI:  71 year old male present to ED with c/o persistent shortness of breath onset 1 day ago. Patient also notes symptoms of leg swelling (onset "earlier today"), chills, chest pain, cough, chest tightness, and abdominal distension. Patient reports that he ran out of his prescribed Lasix 1 day ago, but otherwise endorses medication compliance. Patient has attempted Tx with Albuterol inhaler, but notes no alleviation. Patient also states that his chest pain is exacerbated by coughing. PMHx CHF, Asthma, CAD, DM, HTN, HLD.                   Overview/Hospital Course:  71 year old AAM with Diastolic heart failure (preserved LVEF 55% [11/21]), Aortic stenosis, and pulmonary HTN, who reports acute onset 2 day history of MACK and LE swelling. Admitted on 02/05/2022 for Acute on chronic diastolic congestive heart failure  IV lasix in ED with minimal improvement. Covid rapid negative and BNP consistent with prior levels. He had CXR finding which shows, "bilateral pattern of patchy pulmonary infiltrates.  This is more prominent about the perihilar region and lung bases".  Procalcitonin normal. Cardiology consulted and recommends continuing diuresis. Nephrolgy consulted for CKD. Patient was started on Rocephin/Azithromycin for CAP coverage on admission. Continue strict Is/Os and IV diuresis. Improving on azithromycin + ceftriaxone for CAP due to CXR findings; COVID PCR negative. Patient weaned off O2, now on RA      Interval History: No acute overnight events. Afebrile. Patient on room air. Diuresing well. Complains that urinating a lot is keeping him up. States " his shortness of breath is improving. Continues to have swelling in his legs and feet. No calf tenderness. No chest pain.     Review of Systems   Constitutional: Negative for chills, fatigue and fever.   HENT: Negative for congestion and trouble swallowing.    Eyes: Negative for visual disturbance.   Respiratory: Negative for cough, wheezing and stridor.    Cardiovascular: Positive for leg swelling. Negative for chest pain and palpitations.   Gastrointestinal: Negative for abdominal pain, blood in stool, constipation, diarrhea and nausea.   Genitourinary: Negative for difficulty urinating, dysuria, flank pain, frequency and hematuria.   Musculoskeletal: Negative for arthralgias, joint swelling and neck stiffness.   Skin: Negative for color change, rash and wound.   Neurological: Negative for dizziness, syncope, weakness, light-headedness and headaches.   Hematological: Negative for adenopathy.   Psychiatric/Behavioral: Negative for confusion, hallucinations and self-injury.     Objective:     Vital Signs (Most Recent):  Temp: 98 °F (36.7 °C) (02/09/22 1204)  Pulse: 67 (02/09/22 1204)  Resp: 18 (02/09/22 1204)  BP: (!) 170/91 (02/09/22 1204)  SpO2: (!) 94 % (02/09/22 1204) Vital Signs (24h Range):  Temp:  [97.8 °F (36.6 °C)-98.9 °F (37.2 °C)] 98 °F (36.7 °C)  Pulse:  [60-83] 67  Resp:  [18-19] 18  SpO2:  [92 %-99 %] 94 %  BP: (147-170)/(71-91) 170/91     Weight: 104.3 kg (230 lb)  Body mass index is 32.08 kg/m².    Intake/Output Summary (Last 24 hours) at 2/9/2022 1638  Last data filed at 2/9/2022 1510  Gross per 24 hour   Intake 240 ml   Output 3450 ml   Net -3210 ml      Physical Exam  Constitutional:       General: He is not in acute distress.     Appearance: He is obese. He is not ill-appearing, toxic-appearing or diaphoretic.   HENT:      Head: Normocephalic and atraumatic.      Right Ear: External ear normal.      Left Ear: External ear normal.      Nose: Nose normal.   Eyes:      Extraocular Movements:  Extraocular movements intact.      Conjunctiva/sclera: Conjunctivae normal.   Cardiovascular:      Rate and Rhythm: Normal rate and regular rhythm.   Pulmonary:      Effort: Pulmonary effort is normal. No respiratory distress.      Breath sounds: No wheezing or rales.      Comments: Mildly diminished in lower lung fields  Abdominal:      General: There is no distension.      Palpations: Abdomen is soft.      Tenderness: There is no abdominal tenderness. There is no guarding.      Comments: Large abdominal girth   Genitourinary:     Rectum: Guaiac result negative.   Musculoskeletal:         General: Swelling present. No tenderness. Normal range of motion.      Right lower leg: Edema (+2 pitting edema to foot and pretibial) present.      Left lower leg: Edema (+2 pitting edema to foot and pretibia) present.      Comments: Swelling in bilateral lower extremities gradually improving   Skin:     Findings: No erythema or rash.   Neurological:      General: No focal deficit present.      Mental Status: He is alert and oriented to person, place, and time.   Psychiatric:         Mood and Affect: Mood normal.         Thought Content: Thought content normal.         Significant Labs: All pertinent labs within the past 24 hours have been reviewed.    Significant Imaging: I have reviewed all pertinent imaging results/findings within the past 24 hours.      Assessment/Plan:      * Acute on chronic diastolic congestive heart failure  Heart failure 2/2 to Aortic Stenosis   Continue Fluid restriction, Strict I&Os, Daily weight  Fluid restrict 1.5L   Consult to cardiology- continue IV lasix. [facility]  Echo from 11/2021 with EF 55%, diastolic dysfunction and aortic stenosis  Continue aggressive IV diuresis     Pneumonia due to infectious organism  CXR with infiltrates, pt with mild cough (now resolved) and shortness of breath  Patient improving with abx  Procal normal and no leukocytosis  Suspect likely  more CHF than CAP  Completed 5 days of azithromycin and Rocephin      Acute renal failure  Monitor renal indices  (Cr recently patient was high as 3.9)   Cr at baseline  Avoid any nephrotoxin medication  Strict I and Os   Daily wgt  Nephrology consulted    Essential hypertension  Continue home meds  Held losartan due to elevated creatinine  Increased hydralazine to 50mg TID given elevated BP    CAD (coronary artery disease)  Continue home meds      Hyperlipidemia  Continue home statin    Hypokalemia  Replete as needed  Monitor  Resolved    CKD (chronic kidney disease), stage III  Worsening creatine after IV lasix on admission  - avoid nephrotoxins   - renally dose meds   - Follow labs   - nephrology consulted        Type 2 diabetes mellitus with kidney complication, with long-term current use of insulin  A1c:   Lab Results   Component Value Date    HGBA1C 7.3 (H) 12/10/2021     Meds: SSI PRN to maintain goal 140-180  ADA diet, accuchecks ACHS, hypoglycemic protocol      Paroxysmal atrial flutter  Continue home eliquis   continuous tele monitoring   ekg as needed    Anasarca  Patient continues with swelling of bilateral LE  Continue aggressive IV diuresis  US of bilateral LE with no DVT         VTE Risk Mitigation (From admission, onward)         Ordered     IP VTE HIGH RISK PATIENT  Once         02/05/22 1119     apixaban tablet 5 mg  2 times daily         02/05/22 0538                Discharge Planning   ELY:      Code Status: Full Code   Is the patient medically ready for discharge?:     Reason for patient still in hospital (select all that apply): Patient trending condition, Treatment and Consult recommendations  Discharge Plan A: Home Health                  Tiffanie Rojas DO  Department of Hospital Medicine   [facility]

## 2022-02-09 NOTE — ASSESSMENT & PLAN NOTE
CXR with infiltrates, pt with mild cough (now resolved) and shortness of breath  Patient improving with abx  Procal normal and no leukocytosis  Suspect likely more CHF than CAP  Completed 5 days of azithromycin and Rocephin

## 2022-02-09 NOTE — ASSESSMENT & PLAN NOTE
Patient continues with swelling of bilateral LE  Continue aggressive IV diuresis  US of bilateral LE with no DVT

## 2022-02-09 NOTE — ASSESSMENT & PLAN NOTE
Heart failure 2/2 to Aortic Stenosis   Continue Fluid restriction, Strict I&Os, Daily weight  Fluid restrict 1.5L   Consult to cardiology- continue IV lasix. Will need to follow up in [facility] clinic  Echo from 11/2021 with EF 55%, diastolic dysfunction and aortic stenosis  Continue aggressive IV diuresis

## 2022-02-09 NOTE — SUBJECTIVE & OBJECTIVE
Interval History: No acute overnight events. Afebrile. Patient on room air. Diuresing well. Complains that urinating a lot is keeping him up. States his shortness of breath is improving. Continues to have swelling in his legs and feet. No calf tenderness. No chest pain.     Review of Systems   Constitutional: Negative for chills, fatigue and fever.   HENT: Negative for congestion and trouble swallowing.    Eyes: Negative for visual disturbance.   Respiratory: Negative for cough, wheezing and stridor.    Cardiovascular: Positive for leg swelling. Negative for chest pain and palpitations.   Gastrointestinal: Negative for abdominal pain, blood in stool, constipation, diarrhea and nausea.   Genitourinary: Negative for difficulty urinating, dysuria, flank pain, frequency and hematuria.   Musculoskeletal: Negative for arthralgias, joint swelling and neck stiffness.   Skin: Negative for color change, rash and wound.   Neurological: Negative for dizziness, syncope, weakness, light-headedness and headaches.   Hematological: Negative for adenopathy.   Psychiatric/Behavioral: Negative for confusion, hallucinations and self-injury.     Objective:     Vital Signs (Most Recent):  Temp: 98 °F (36.7 °C) (02/09/22 1204)  Pulse: 67 (02/09/22 1204)  Resp: 18 (02/09/22 1204)  BP: (!) 170/91 (02/09/22 1204)  SpO2: (!) 94 % (02/09/22 1204) Vital Signs (24h Range):  Temp:  [97.8 °F (36.6 °C)-98.9 °F (37.2 °C)] 98 °F (36.7 °C)  Pulse:  [60-83] 67  Resp:  [18-19] 18  SpO2:  [92 %-99 %] 94 %  BP: (147-170)/(71-91) 170/91     Weight: 104.3 kg (230 lb)  Body mass index is 32.08 kg/m².    Intake/Output Summary (Last 24 hours) at 2/9/2022 1638  Last data filed at 2/9/2022 1510  Gross per 24 hour   Intake 240 ml   Output 3450 ml   Net -3210 ml      Physical Exam  Constitutional:       General: He is not in acute distress.     Appearance: He is obese. He is not ill-appearing, toxic-appearing or diaphoretic.   HENT:      Head: Normocephalic and  atraumatic.      Right Ear: External ear normal.      Left Ear: External ear normal.      Nose: Nose normal.   Eyes:      Extraocular Movements: Extraocular movements intact.      Conjunctiva/sclera: Conjunctivae normal.   Cardiovascular:      Rate and Rhythm: Normal rate and regular rhythm.   Pulmonary:      Effort: Pulmonary effort is normal. No respiratory distress.      Breath sounds: No wheezing or rales.      Comments: Mildly diminished in lower lung fields  Abdominal:      General: There is no distension.      Palpations: Abdomen is soft.      Tenderness: There is no abdominal tenderness. There is no guarding.      Comments: Large abdominal girth   Genitourinary:     Rectum: Guaiac result negative.   Musculoskeletal:         General: Swelling present. No tenderness. Normal range of motion.      Right lower leg: Edema (+2 pitting edema to foot and pretibial) present.      Left lower leg: Edema (+2 pitting edema to foot and pretibia) present.      Comments: Swelling in bilateral lower extremities gradually improving   Skin:     Findings: No erythema or rash.   Neurological:      General: No focal deficit present.      Mental Status: He is alert and oriented to person, place, and time.   Psychiatric:         Mood and Affect: Mood normal.         Thought Content: Thought content normal.         Significant Labs: All pertinent labs within the past 24 hours have been reviewed.    Significant Imaging: I have reviewed all pertinent imaging results/findings within the past 24 hours.

## 2022-02-09 NOTE — ASSESSMENT & PLAN NOTE
Continue home meds  Held losartan due to elevated creatinine  Increased hydralazine to 50mg TID given elevated BP

## 2022-02-09 NOTE — NURSING
In preparation for discharge, d/c'd pt's saline lock, applied pressure to site, secured gauze and coban, no redness or swelling noted. Instructions given.Reviewed all new meds, continued medications, follow up appointments and signs and symptoms to report to PCP or seek emergency medical care. Pt verbalized understanding to all instructions and education. Pt denies any complaints or concerns at this time. All patient belongings sent with patient. [facility] called to transport patient home. Patient stated he will have access to his home.Awaiting patient transport. Will continue to monitor.

## 2022-02-10 VITALS
WEIGHT: 230 LBS | SYSTOLIC BLOOD PRESSURE: 137 MMHG | RESPIRATION RATE: 18 BRPM | OXYGEN SATURATION: 97 % | HEART RATE: 72 BPM | TEMPERATURE: 98 F | BODY MASS INDEX: 32.2 KG/M2 | DIASTOLIC BLOOD PRESSURE: 88 MMHG | HEIGHT: 71 IN

## 2022-02-10 PROBLEM — N17.9 ACUTE RENAL FAILURE: Status: RESOLVED | Noted: 2017-03-10 | Resolved: 2022-02-10

## 2022-02-10 PROBLEM — E87.6 HYPOKALEMIA: Status: RESOLVED | Noted: 2019-02-14 | Resolved: 2022-02-10

## 2022-02-10 LAB
ANION GAP SERPL CALC-SCNC: 12 MMOL/L (ref 8–16)
BUN SERPL-MCNC: 42 MG/DL (ref 8–23)
CALCIUM SERPL-MCNC: 10.2 MG/DL (ref 8.7–10.5)
CHLORIDE SERPL-SCNC: 99 MMOL/L (ref 95–110)
CO2 SERPL-SCNC: 31 MMOL/L (ref 23–29)
CREAT SERPL-MCNC: 1.9 MG/DL (ref 0.5–1.4)
EST. GFR  (AFRICAN AMERICAN): 40 ML/MIN/1.73 M^2
EST. GFR  (NON AFRICAN AMERICAN): 35 ML/MIN/1.73 M^2
GLUCOSE SERPL-MCNC: 152 MG/DL (ref 70–110)
POCT GLUCOSE: 153 MG/DL (ref 70–110)
POCT GLUCOSE: 224 MG/DL (ref 70–110)
POTASSIUM SERPL-SCNC: 3.7 MMOL/L (ref 3.5–5.1)
SODIUM SERPL-SCNC: 142 MMOL/L (ref 136–145)

## 2022-02-10 PROCEDURE — 25000003 PHARM REV CODE 250: Performed by: NURSE PRACTITIONER

## 2022-02-10 PROCEDURE — 63600175 PHARM REV CODE 636 W HCPCS: Performed by: INTERNAL MEDICINE

## 2022-02-10 PROCEDURE — 25000003 PHARM REV CODE 250: Performed by: INTERNAL MEDICINE

## 2022-02-10 PROCEDURE — 36415 COLL VENOUS BLD VENIPUNCTURE: CPT | Performed by: STUDENT IN AN ORGANIZED HEALTH CARE EDUCATION/TRAINING PROGRAM

## 2022-02-10 PROCEDURE — 80048 BASIC METABOLIC PNL TOTAL CA: CPT | Performed by: STUDENT IN AN ORGANIZED HEALTH CARE EDUCATION/TRAINING PROGRAM

## 2022-02-10 PROCEDURE — 25000003 PHARM REV CODE 250: Performed by: STUDENT IN AN ORGANIZED HEALTH CARE EDUCATION/TRAINING PROGRAM

## 2022-02-10 RX ORDER — AMIODARONE HYDROCHLORIDE 200 MG/1
200 TABLET ORAL DAILY
Qty: 30 TABLET | Refills: 1 | Status: ON HOLD | OUTPATIENT
Start: 2022-02-10 | End: 2023-01-06 | Stop reason: HOSPADM

## 2022-02-10 RX ORDER — METOPROLOL SUCCINATE 50 MG/1
50 TABLET, EXTENDED RELEASE ORAL DAILY
Qty: 30 TABLET | Refills: 1 | Status: SHIPPED | OUTPATIENT
Start: 2022-02-10 | End: 2023-01-05

## 2022-02-10 RX ORDER — FUROSEMIDE 80 MG/1
80 TABLET ORAL 2 TIMES DAILY
Qty: 60 TABLET | Refills: 1 | Status: SHIPPED | OUTPATIENT
Start: 2022-02-10 | End: 2023-01-05

## 2022-02-10 RX ORDER — METOLAZONE 5 MG/1
20 TABLET ORAL ONCE
Status: COMPLETED | OUTPATIENT
Start: 2022-02-10 | End: 2022-02-10

## 2022-02-10 RX ORDER — HYDRALAZINE HYDROCHLORIDE 25 MG/1
50 TABLET, FILM COATED ORAL EVERY 8 HOURS
Qty: 90 TABLET | Refills: 1 | Status: SHIPPED | OUTPATIENT
Start: 2022-02-10 | End: 2023-01-31

## 2022-02-10 RX ADMIN — TAMSULOSIN HYDROCHLORIDE 0.4 MG: 0.4 CAPSULE ORAL at 08:02

## 2022-02-10 RX ADMIN — APIXABAN 5 MG: 5 TABLET, FILM COATED ORAL at 08:02

## 2022-02-10 RX ADMIN — AMIODARONE HYDROCHLORIDE 200 MG: 200 TABLET ORAL at 08:02

## 2022-02-10 RX ADMIN — ASPIRIN 81 MG CHEWABLE TABLET 81 MG: 81 TABLET CHEWABLE at 08:02

## 2022-02-10 RX ADMIN — Medication 800 MG: at 08:02

## 2022-02-10 RX ADMIN — METOLAZONE 20 MG: 5 TABLET ORAL at 09:02

## 2022-02-10 RX ADMIN — HYDRALAZINE HYDROCHLORIDE 50 MG: 25 TABLET, FILM COATED ORAL at 01:02

## 2022-02-10 RX ADMIN — HYDRALAZINE HYDROCHLORIDE 50 MG: 25 TABLET, FILM COATED ORAL at 05:02

## 2022-02-10 RX ADMIN — FUROSEMIDE 80 MG: 40 INJECTION, SOLUTION INTRAMUSCULAR; INTRAVENOUS at 08:02

## 2022-02-10 RX ADMIN — POTASSIUM CHLORIDE 40 MEQ: 20 TABLET, EXTENDED RELEASE ORAL at 08:02

## 2022-02-10 RX ADMIN — FAMOTIDINE 20 MG: 20 TABLET, FILM COATED ORAL at 08:02

## 2022-02-10 RX ADMIN — METOPROLOL SUCCINATE 50 MG: 50 TABLET, EXTENDED RELEASE ORAL at 08:02

## 2022-02-10 NOTE — PLAN OF CARE
02/10/22 1122   Post-Acute Status   Post-Acute Authorization Home Health   Home Health Status Referrals Sent   Hospital Resources/Appts/Education Provided Appointments scheduled and added to AVS   Patient choice form signed by patient/caregiver   (Discussed with patient via video chat)   Discharge Delays (!) Post-Acute Set-up   Patient is a [facility] patient and agreed to [facility].  Referral sent to [facility] via care\[facility]\"" and CM called [facility] and notified Leigh at [facility]  that referral was placed.

## 2022-02-10 NOTE — PROGRESS NOTES
Renal Progress Note    Date of Admission:  2/5/2022  2:03 AM    Length of Stay: 5  Days    Subjective: n/a    Objective:    Current Facility-Administered Medications   Medication    acetaminophen tablet 650 mg    albuterol sulfate nebulizer solution 2.5 mg    amiodarone tablet 200 mg    apixaban tablet 5 mg    aspirin chewable tablet 81 mg    atorvastatin tablet 20 mg    dextrose 10% bolus 125 mL    dextrose 10% bolus 250 mL    famotidine tablet 20 mg    furosemide injection 80 mg    glucagon (human recombinant) injection 1 mg    glucose chewable tablet 16 g    glucose chewable tablet 24 g    hydrALAZINE tablet 50 mg    insulin aspart U-100 pen 0-5 Units    magnesium oxide tablet 800 mg    metoprolol succinate (TOPROL-XL) 24 hr tablet 50 mg    ondansetron injection 4 mg    potassium chloride SA CR tablet 40 mEq    sodium chloride 0.9% flush 10 mL    tamsulosin 24 hr capsule 0.4 mg       Vitals:    02/09/22 2046 02/10/22 0100 02/10/22 0524 02/10/22 0730   BP: (!) 149/74 (!) 178/84 (!) 176/82 139/80   BP Location: Right arm Right arm Right arm Right arm   Patient Position: Sitting Lying Lying Lying   Pulse: 61 (!) 55 84 72   Resp: 20 19 18 17   Temp: 98.6 °F (37 °C) 98.7 °F (37.1 °C) 98 °F (36.7 °C) 98.3 °F (36.8 °C)   TempSrc: Oral Oral Oral Oral   SpO2: 95% 95% 98% 98%   Weight:       Height:           I/O last 3 completed shifts:  In: 620 [P.O.:620]  Out: 4900 [Urine:4900]  No intake/output data recorded.      Physical Exam:     General: NAD  Neck: supple  Heart: RRR  Lungs: unlabored breathing  Abdomen: n/a  Limbs: + edema improved  Neurologic: asleep      Laboratories:    No results for input(s): WBC, RBC, HGB, HCT, PLT, MCV, MCH, MCHC in the last 24 hours.    Recent Labs   Lab 02/10/22  0630   CALCIUM 10.2      K 3.7   CO2 31*   CL 99   BUN 42*   CREATININE 1.9*       No results for input(s): COLORU, CLARITYU, SPECGRAV, PHUR, PROTEINUA, GLUCOSEU, BLOODU, WBCU,  RBCU, BACTERIA, MUCUS in the last 24 hours.    Invalid input(s):  BILIRUBINCON    Microbiology Results (last 7 days)     ** No results found for the last 168 hours. **            Diagnostic Tests:     US Lower Extremity Veins Bilateral     FINDINGS:   No evidence of clot involving the bilateral common femoral, greater saphenous, femoral, popliteal, peroneal, anterior and posterior tibial veins.  All venous structures demonstrate normal respiratory phasicity and augment adequately.  No evidence of soft tissue mass or Baker's cyst.    Impression:       No evidence of lower extremity deep venous thrombosis.       Electronically signed by: Mine Dasilva MD   Date: 02/08/2022            Assessment:       72 y/o male admitted with:     - Acute-chronic diastolic HF with Vascular congestion  - Anasarca  - PA-flutter  - CKD-3 (baseline 2s) creatinine currently around baseline for Pte.  - Chronically uncontrolled DM-2 w/ renal manifestations  - Non-nephrotic proteinuria estimated at 1.2g/day  - HTN  - Mild Anemia of chronic illness  - CAD  - HLD  - Hx. Alcohol abuse           Plan:    - Switch to oral Lasix  - Replace K+ as needed  - Low Na+ diet  - Adjust Hydralazine and Metoprolol dose as needed  - No ARBs or ACEI for now  - Discharge planning and other problems per admitting

## 2022-02-10 NOTE — PLAN OF CARE
Problem: Adult Inpatient Plan of Care  Goal: Plan of Care Review  Outcome: Adequate for Care Transition  Goal: Patient-Specific Goal (Individualized)  Outcome: Adequate for Care Transition  Goal: Absence of Hospital-Acquired Illness or Injury  Outcome: Adequate for Care Transition  Goal: Optimal Comfort and Wellbeing  Outcome: Adequate for Care Transition  Goal: Readiness for Transition of Care  Outcome: Adequate for Care Transition     Problem: Adjustment to Illness (Sepsis/Septic Shock)  Goal: Optimal Coping  Outcome: Adequate for Care Transition     Problem: Bleeding (Sepsis/Septic Shock)  Goal: Absence of Bleeding  Outcome: Adequate for Care Transition     Problem: Glycemic Control Impaired (Sepsis/Septic Shock)  Goal: Blood Glucose Level Within Desired Range  Outcome: Adequate for Care Transition     Problem: Infection Progression (Sepsis/Septic Shock)  Goal: Absence of Infection Signs and Symptoms  Outcome: Adequate for Care Transition     Problem: Fluid and Electrolyte Imbalance (Acute Kidney Injury/Impairment)  Goal: Fluid and Electrolyte Balance  Outcome: Adequate for Care Transition     Problem: Renal Function Impairment (Acute Kidney Injury/Impairment)  Goal: Effective Renal Function  Outcome: Adequate for Care Transition     Problem: Infection (Pneumonia)  Goal: Resolution of Infection Signs and Symptoms  Outcome: Adequate for Care Transition     Problem: Respiratory Compromise (Pneumonia)  Goal: Effective Oxygenation and Ventilation  Outcome: Adequate for Care Transition     Problem: Fall Injury Risk  Goal: Absence of Fall and Fall-Related Injury  Outcome: Adequate for Care Transition

## 2022-02-10 NOTE — PLAN OF CARE
[facility] ORDERS  FACE TO FACE ENCOUNTER    Patient Name: Chato Bowie  YOB: 1950    [facility]   PCP Address: George RASMUSSEN / LUIZA ABRAMS  PCP Phone Number: [phone]  PCP Fax: [phone]    Encounter Date: 2/5/22    Admit to Home Health    Diagnoses:  Active Hospital Problems    Diagnosis  POA    *Acute on chronic diastolic congestive heart failure [I50.33]  Yes     Priority: 1 - High    Pneumonia due to infectious organism [J18.9]  Yes     Priority: 2     Essential hypertension [I10]  Yes     Priority: 4      Chronic    CAD (coronary artery disease) [I25.10]  Yes     Priority: 5      Chronic    Hyperlipidemia [E78.5]  Yes     Priority: 6      Chronic    CKD (chronic kidney disease), stage III [N18.30]  Yes     Priority: 8     Type 2 diabetes mellitus with kidney complication, with long-term current use of insulin [E11.29, Z79.4]  Not Applicable     Priority: 9     Paroxysmal atrial flutter [I48.92]  Yes     Priority: 10     Anasarca [R60.1]  Yes      Resolved Hospital Problems    Diagnosis Date Resolved POA    Acute renal failure [N17.9] 02/10/2022 Yes     Priority: 3     Hypokalemia [E87.6] 02/10/2022 Yes     Priority: 7        Follow Up Appointments:  Future Appointments   Date Time Provider Department Center   2/17/2022  3:00 PM Luisana Rodríguez MD [facility] CARDIO [facility] Hos       Allergies:Review of patient's allergies indicates:  No Known Allergies    Medications: Review discharge medications with patient and family and provide education.    Current Facility-Administered Medications   Medication Dose Route Frequency Provider Last Rate Last Admin    acetaminophen tablet 650 mg  650 mg Oral Q6H PRN Raimundo Norman NP        albuterol sulfate nebulizer solution 2.5 mg  2.5 mg Nebulization Q4H PRN Raimundo Norman NP   2.5 mg at 02/07/22 1945    amiodarone tablet 200 mg  200 mg Oral Daily Raimundo Norman NP   200  mg at 02/10/22 0824    apixaban tablet 5 mg  5 mg Oral BID Manicia S. Emerson, NP   5 mg at 02/10/22 0823    aspirin chewable tablet 81 mg  81 mg Oral Daily Manicia S. Emerson, NP   81 mg at 02/10/22 0823    atorvastatin tablet 20 mg  20 mg Oral QHS Manicia S. Emerson, NP   20 mg at 02/09/22 2042    dextrose 10% bolus 125 mL  12.5 g Intravenous PRN Sea Phelan MD        dextrose 10% bolus 250 mL  25 g Intravenous PRN Sea Phelan MD        famotidine tablet 20 mg  20 mg Oral Daily Manicia S. Emerson, NP   20 mg at 02/10/22 0824    furosemide injection 80 mg  80 mg Intravenous Q12H Avni Tinoco MD   80 mg at 02/10/22 0823    glucagon (human recombinant) injection 1 mg  1 mg Intramuscular PRN Manicia S. Emerson, NP        glucose chewable tablet 16 g  16 g Oral PRN Manicia S. Emerson, NP        glucose chewable tablet 24 g  24 g Oral PRN Manicia S. Emerson, NP        hydrALAZINE tablet 50 mg  50 mg Oral Q8H [facility]. Rojas, DO   50 mg at 02/10/22 0550    insulin aspart U-100 pen 0-5 Units  0-5 Units Subcutaneous QID (AC + HS) PRN Manicia S. Emerson, NP   1 Units at 02/09/22 2051    magnesium oxide tablet 800 mg  800 mg Oral Daily Manicia S. Emerson, NP   800 mg at 02/10/22 0823    metOLazone tablet 20 mg  20 mg Oral Once Avni Tinoco MD        metoprolol succinate (TOPROL-XL) 24 hr tablet 50 mg  50 mg Oral Daily Manicia S. Emerson, NP   50 mg at 02/10/22 0823    ondansetron injection 4 mg  4 mg Intravenous Q6H PRN Manicia S. Emerson, NP        potassium chloride SA CR tablet 40 mEq  40 mEq Oral Daily Manicia S. Emerson, NP   40 mEq at 02/10/22 0823    sodium chloride 0.9% flush 10 mL  10 mL Intravenous PRN Jose Powers MD        tamsulosin 24 hr capsule 0.4 mg  0.4 mg Oral Daily Raimundo Norman NP   0.4 mg at 02/10/22 0824     Current Discharge Medication List      CONTINUE these medications which have CHANGED    Details   amiodarone (PACERONE) 200 MG Tab  Take 1 tablet (200 mg total) by mouth once daily.  Qty: 30 tablet, Refills: 1      apixaban (ELIQUIS) 5 mg Tab Take 1 tablet (5 mg total) by mouth 2 (two) times daily.  Qty: 60 tablet, Refills: 1      furosemide (LASIX) 80 MG tablet Take 1 tablet (80 mg total) by mouth 2 (two) times daily.  Qty: 60 tablet, Refills: 1      hydrALAZINE (APRESOLINE) 25 MG tablet Take 2 tablets (50 mg total) by mouth every 8 (eight) hours.  Qty: 90 tablet, Refills: 1    Comments: .      metoprolol succinate (TOPROL-XL) 50 MG 24 hr tablet Take 1 tablet (50 mg total) by mouth once daily.  Qty: 30 tablet, Refills: 1    Comments: .         CONTINUE these medications which have NOT CHANGED    Details   albuterol (PROVENTIL/VENTOLIN HFA) 90 mcg/actuation inhaler Inhale 2 puffs into the lungs every 4 (four) hours as needed.  Qty: 8.5 g, Refills: 5      aspirin 81 MG Chew Take 1 tablet (81 mg total) by mouth once daily.  Refills: 0      DUREZOL 0.05 % Drop ophthalmic solution INT 1 GTT INTO OD FID FOR 3 WEEKS      famotidine (PEPCID) 40 MG tablet Take 40 mg by mouth once daily.      insulin aspart U-100 (NOVOLOG) 100 unit/mL (3 mL) InPn pen Inject 5 Units into the skin 3 (three) times daily.  Qty: 4.5 mL, Refills: 11      magnesium oxide (MAG-OX) 400 mg (241.3 mg magnesium) tablet Take 800 mg by mouth.      metOLazone (ZAROXOLYN) 5 MG tablet TAKE 1 TABLET BY MOUTH 3 TIMES A WEEK AS NEEDED FOR SEVERE LEG SWELLING      multivitamin Tab Take 1 tablet by mouth once daily.  Qty:        potassium chloride SA (K-DUR,KLOR-CON) 20 MEQ tablet Take 2 tablets by mouth once daily.      rosuvastatin (CRESTOR) 20 MG tablet Take 20 mg by mouth once daily.      tamsulosin (FLOMAX) 0.4 mg Cap Take 1 capsule (0.4 mg total) by mouth once daily.  Qty: 30 capsule, Refills: 11         STOP taking these medications       amLODIPine (NORVASC) 10 MG tablet Comments:   Reason for Stopping:         fluticasone-salmeterol diskus inhaler 100-50 mcg Comments:   Reason for  Stopping:         losartan-hydrochlorothiazide 100-25 mg (HYZAAR) 100-25 mg per tablet Comments:   Reason for Stopping:         NOVOLIN 70/30 U-100 INSULIN 100 unit/mL (70-30) injection Comments:   Reason for Stopping:         omeprazole (PRILOSEC) 20 MG capsule Comments:   Reason for Stopping:         spironolactone (ALDACTONE) 25 MG tablet Comments:   Reason for Stopping:                 I have seen and examined this patient within the last 30 days. My clinical findings that support the need for the home health skilled services and home bound status are the following:no   Weakness/numbness causing balance and gait disturbance due to Heart Failure, Weakness/Debility and Anemia making it taxing to leave home.  Requiring assistive device to leave home due to unsteady gait caused by  Weakness/Debility.     Diet:   cardiac diet and renal diet      Referrals/ Consults  Physical Therapy to evaluate and treat. Evaluate for home safety and equipment needs; Establish/upgrade home exercise program. Perform / instruct on therapeutic exercises, gait training, transfer training, and Range of Motion.    Activities:   activity as tolerated    Nursing:   Agency to admit patient within 24 hours of hospital discharge unless specified on physician order or at patient request    SN to complete comprehensive assessment including routine vital signs. Instruct on disease process and s/s of complications to report to MD. Review/verify medication list sent home with the patient at time of discharge  and instruct patient/caregiver as needed. Frequency may be adjusted depending on start of care date.     Skilled nurse to perform up to 3 visits PRN for symptoms related to diagnosis    Notify MD if SBP > 160 or < 90; DBP > 90 or < 50; HR > 120 or < 50; Temp > 101; O2 < 88%; Other:       Ok to schedule additional visits based on staff availability and patient request on consecutive days within the home health episode.    When multiple  disciplines ordered:    Start of Care occurs on Sunday - Wednesday schedule remaining discipline evaluations as ordered on separate consecutive days following the start of care.    Thursday SOC -schedule subsequent evaluations Friday and Monday the following week.     Friday - Saturday SOC - schedule subsequent discipline evaluations on consecutive days starting Monday of the following week.    For all post-discharge communication and subsequent orders please contact patient's primary care physician.      Home Health Aide:  Nursing Three times weekly and Physical Therapy Three times weekly    Wound Care Orders  no    I certify that this patient is confined to his home and needs intermittent skilled nursing care and physical therapy.

## 2022-02-10 NOTE — DISCHARGE SUMMARY
"[facility] Medicine  Discharge Summary      Patient Name: Chato Bowie  MRN: [id number]  Patient Class: IP- Inpatient  Admission Date: 2/5/2022  Hospital Length of Stay: 5 days  Discharge Date and Time: 2/10/2022  3:00 PM  Attending Physician: No att. providers found   Discharging Provider: Tiffanie Rojas DO  Primary Care Provider: Irlanda Valenzuela      HPI:   71 year old male present to ED with c/o persistent shortness of breath onset 1 day ago. Patient also notes symptoms of leg swelling (onset "earlier today"), chills, chest pain, cough, chest tightness, and abdominal distension. Patient reports that he ran out of his prescribed Lasix 1 day ago, but otherwise endorses medication compliance. Patient has attempted Tx with Albuterol inhaler, but notes no alleviation. Patient also states that his chest pain is exacerbated by coughing. PMHx CHF, Asthma, CAD, DM, HTN, HLD.                   * No surgery found *      Hospital Course:   71 year old AAM with Diastolic heart failure (preserved LVEF 55% [11/21]), Aortic stenosis, and pulmonary HTN, who reports acute onset 2 day history of MACK and LE swelling. Admitted on 02/05/2022 for Acute on chronic diastolic congestive heart failure  IV lasix in ED with minimal improvement. Covid rapid negative and BNP consistent with prior levels. He had CXR finding which shows, "bilateral pattern of patchy pulmonary infiltrates.  This is more prominent about the perihilar region and lung bases".  Procalcitonin normal. Cardiology consulted and recommends continuing diuresis. Nephrolgy consulted for CKD and recommended aggressive diuresis. Patient was started on Rocephin/Azithromycin for CAP coverage on admission. Home norvasc discontinued given pedal edema and anasraca. US of bilateral lower extremities with no evidence of lower extremity deep venous thrombosis.      Patient dyspnea and swelling improved with diuresis and antibiotics. COVID PCR negative. " Patient weaned off O2 and currently on room air.  Admits significant improvement in dyspnea and anasarca.  Lower extremities with mild edema, but significantly improved since admission.  Patient denies any fever, headaches, vision changes, chest pain, shortness of breath, difficulty breathing, abdominal pain, nausea, vomiting, or any new weaknesses.  No issues with urination. Patient's exam on discharge was as follow: Patient is alert and oriented, appears in no acute distress, heart with regular rate and rhythm, lungs clear to asculation with non-labored breathing, abdomen soft and nontender, and no new weaknesses or focal deficits seen.  Bilateral lower extremity with +1 pitting edema, significantly improved.  No calf tenderness bilaterally.    Patient was counseled regarding any abnormal labs, differential diagnosis, treatment options, risk-benefit, lifestyle changes, prognosis, current condition, and medications. Patient was interactive and attentive.  Patient's questions were answered in a respectful and timely manner. Patient was instructed to follow-up with PCP within 1 week and to continue taking medications as prescribed.  Instructed to also follow up with nephrology and cardiology outpatient. Referral placed if needed. Also, extensively discussed the risks, benefits, and side effects of patient's medications. Discussed with patient about any medication changes. Provided refills for the requested medications and sent new prescriptions to pharmacy. Patient verbalized understanding and agrees to treatment plan.  Patient is stable for discharge.  Patient has no other questions or concerns at this time.  ED precautions discussed with the patient.    Vital signs are stable. Ambulating without any difficulty. Tolerating p.o. intake without any nausea or vomiting. Afebrile for over 24 hours. Patient is in stable condition and has no questions or concerns. Patient will be discharge to home with home health for PT  and nurse. Referrals placed for nephrology and cardiology             Goals of Care Treatment Preferences:  Code Status: Full Code      Consults:   Consults (From admission, onward)        Status Ordering Provider     Inpatient consult to Nephrology  Once        Provider:  Avni Tinoco MD    Acknowledged NANCY DELEON.     Inpatient consult to Cardiology  Once        Provider:  Luisana Rodríguez MD    Completed MODE TERRAZAS     Inpatient virtual consult to Hospital Medicine  Once        Provider:  Brigitte Emanuel MD    Completed MODE TERRAZAS          No new Assessment & Plan notes have been filed under this hospital service since the last note was generated.  Service: Hospital Medicine    Final Active Diagnoses:    Diagnosis Date Noted POA    PRINCIPAL PROBLEM:  Acute on chronic diastolic congestive heart failure [I50.33] 12/29/2020 Yes    Pneumonia due to infectious organism [J18.9] 12/05/2020 Yes    Essential hypertension [I10] 03/10/2017 Yes     Chronic    CAD (coronary artery disease) [I25.10] 03/08/2016 Yes     Chronic    Hyperlipidemia [E78.5] 03/10/2017 Yes     Chronic    CKD (chronic kidney disease), stage III [N18.30] 11/24/2020 Yes    Type 2 diabetes mellitus with kidney complication, with long-term current use of insulin [E11.29, Z79.4] 03/10/2017 Not Applicable    Paroxysmal atrial flutter [I48.92] 12/07/2020 Yes    Anasarca [R60.1] 02/08/2022 Yes      Problems Resolved During this Admission:    Diagnosis Date Noted Date Resolved POA    Acute renal failure [N17.9] 03/10/2017 02/10/2022 Yes    Hypokalemia [E87.6] 02/14/2019 02/10/2022 Yes       Discharged Condition: stable    Disposition: Home or Self Care    Follow Up:   Follow-up Information     [facility] On 2/11/2022.    Why: Primary Care follow up, please arrive at 2:00 PM  Contact information:  [address] [id number] [phone]             Avni Tinoco MD In 1 week.    Specialty:  Nephrology  Why: Office will call with appointment details.   Contact information:  [address] [place] [address] [address]  [place] [id number] [phone]             [facility] [place].    Specialties: Home Health Services, Physical Therapy, Occupational Therapy  Why: Home Health  Contact information:  [facility] [address]  [address]  [place] [id number] [phone]                       Patient Instructions:      Ambulatory referral/consult to Nephrology   Standing Status: Future   Referral Priority: Routine Referral Type: Consultation   Referral Reason: Specialty Services Required   Requested Specialty: Nephrology   Number of Visits Requested: 1     Ambulatory referral/consult to Cardiology   Standing Status: Future   Referral Priority: Routine Referral Type: Consultation   Referral Reason: Specialty Services Required   Requested Specialty: Cardiology   Number of Visits Requested: 1     Diet Cardiac     Diet diabetic     Notify your health care provider if you experience any of the following:  difficulty breathing or increased cough     Notify your health care provider if you experience any of the following:  severe persistent headache     Notify your health care provider if you experience any of the following:  persistent dizziness, light-headedness, or visual disturbances     Notify your health care provider if you experience any of the following:  increased confusion or weakness     Activity as tolerated           Pending Diagnostic Studies:     None         Medications:  Reconciled Home Medications:      Medication List      CHANGE how you take these medications    furosemide 80 MG tablet  Commonly known as: LASIX  Take 1 tablet (80 mg total) by mouth 2 (two) times daily.  What changed: when to take this     hydrALAZINE 25 MG tablet  Commonly known as: APRESOLINE  Take 2 tablets (50 mg total) by mouth every 8 (eight) hours.  What changed: how much to take        CONTINUE taking these medications     albuterol 90 mcg/actuation inhaler  Commonly known as: PROVENTIL/VENTOLIN HFA  Inhale 2 puffs into the lungs every 4 (four) hours as needed.     amiodarone 200 MG Tab  Commonly known as: PACERONE  Take 1 tablet (200 mg total) by mouth once daily.     apixaban 5 mg Tab  Commonly known as: ELIQUIS  Take 1 tablet (5 mg total) by mouth 2 (two) times daily.     aspirin 81 MG Chew  Take 1 tablet (81 mg total) by mouth once daily.     DurezoL 0.05 % Drop ophthalmic solution  Generic drug: difluprednate  INT 1 GTT INTO OD FID FOR 3 WEEKS     famotidine 40 MG tablet  Commonly known as: PEPCID  Take 40 mg by mouth once daily.     insulin aspart U-100 100 unit/mL (3 mL) Inpn pen  Commonly known as: NovoLOG  Inject 5 Units into the skin 3 (three) times daily.     magnesium oxide 400 mg (241.3 mg magnesium) tablet  Commonly known as: MAG-OX  Take 800 mg by mouth.     metOLazone 5 MG tablet  Commonly known as: ZAROXOLYN  TAKE 1 TABLET BY MOUTH 3 TIMES A WEEK AS NEEDED FOR SEVERE LEG SWELLING     metoprolol succinate 50 MG 24 hr tablet  Commonly known as: TOPROL-XL  Take 1 tablet (50 mg total) by mouth once daily.     multivitamin Tab  Take 1 tablet by mouth once daily.     potassium chloride SA 20 MEQ tablet  Commonly known as: K-DUR,KLOR-CON  Take 2 tablets by mouth once daily.     rosuvastatin 20 MG tablet  Commonly known as: CRESTOR  Take 20 mg by mouth once daily.     tamsulosin 0.4 mg Cap  Commonly known as: FLOMAX  Take 1 capsule (0.4 mg total) by mouth once daily.        STOP taking these medications    amLODIPine 10 MG tablet  Commonly known as: NORVASC     fluticasone-salmeterol 100-50 mcg/dose 100-50 mcg/dose diskus inhaler  Commonly known as: ADVAIR     losartan-hydrochlorothiazide 100-25 mg 100-25 mg per tablet  Commonly known as: HYZAAR     NovoLIN 70/30 U-100 Insulin 100 unit/mL (70-30) injection  Generic drug: insulin NPH-insulin regular (70/30)     omeprazole 20 MG capsule  Commonly known as: PRILOSEC      spironolactone 25 MG tablet  Commonly known as: ALDACTONE            Indwelling Lines/Drains at time of discharge:   Lines/Drains/Airways     None                 Time spent on the discharge of patient: Greater than 35 minutes         Tiffanie Rojas DO  Department of Hospital Medicine  [facility] [place]

## 2022-02-10 NOTE — PLAN OF CARE
02/10/22 1332   Final Note   Assessment Type Final Discharge Note   Anticipated Discharge Disposition HomeHealth   Hospital Resources/Appts/Education Provided Appointments scheduled and added to AVS;Provided education on problems/symptoms using teachback   Post-Acute Status   Post-Acute Authorization Home Health   Home Health Status Set-up Complete/Auth obtained   Patient choice form signed by patient/caregiver   (spoke with patient via video chat)   Discharge Delays None known at this time   EDUCATION:  Things You are responsible For To Manage Your Care At Home:  1.    Getting your prescriptions filled   2.    Taking your medications as directed, DO NOT MISS ANY DOSES!  3.    Going to your follow-up doctor appointment. This is important because it  allow the doctor to monitor your progress and determine if  any changes need to made to your treatment plan.  Call [facility] Help at home number for new or repeated problems / symptoms   Call 911 for CP and / or SOB    Patient agreed to all above    [place] sent secure message to nurseJUAN.

## 2022-02-10 NOTE — NURSING
Discharge instructions given to patient at bedside. Patient verbalized understanding of instructions. Patient states willingness to comply. Saline lock removed. Tele monitoring removed.  Writer waiting for CM to get order for patient's HH.

## 2022-02-10 NOTE — PLAN OF CARE
Problem: Adult Inpatient Plan of Care  Goal: Plan of Care Review  Outcome: Ongoing, Progressing  Goal: Patient-Specific Goal (Individualized)  Outcome: Ongoing, Progressing  Goal: Absence of Hospital-Acquired Illness or Injury  Outcome: Ongoing, Progressing  Goal: Optimal Comfort and Wellbeing  Outcome: Ongoing, Progressing  Goal: Readiness for Transition of Care  Outcome: Ongoing, Progressing     Problem: Adjustment to Illness (Sepsis/Septic Shock)  Goal: Optimal Coping  Outcome: Ongoing, Progressing     Problem: Bleeding (Sepsis/Septic Shock)  Goal: Absence of Bleeding  Outcome: Ongoing, Progressing     Problem: Glycemic Control Impaired (Sepsis/Septic Shock)  Goal: Blood Glucose Level Within Desired Range  Outcome: Ongoing, Progressing     Problem: Infection Progression (Sepsis/Septic Shock)  Goal: Absence of Infection Signs and Symptoms  Outcome: Ongoing, Progressing     Problem: Nutrition Impaired (Sepsis/Septic Shock)  Goal: Optimal Nutrition Intake  Outcome: Ongoing, Progressing     Problem: Fluid and Electrolyte Imbalance (Acute Kidney Injury/Impairment)  Goal: Fluid and Electrolyte Balance  Outcome: Ongoing, Progressing     Problem: Oral Intake Inadequate (Acute Kidney Injury/Impairment)  Goal: Optimal Nutrition Intake  Outcome: Ongoing, Progressing     Problem: Renal Function Impairment (Acute Kidney Injury/Impairment)  Goal: Effective Renal Function  Outcome: Ongoing, Progressing     Problem: Fluid Imbalance (Pneumonia)  Goal: Fluid Balance  Outcome: Ongoing, Progressing     Problem: Infection (Pneumonia)  Goal: Resolution of Infection Signs and Symptoms  Outcome: Ongoing, Progressing     Problem: Respiratory Compromise (Pneumonia)  Goal: Effective Oxygenation and Ventilation  Outcome: Ongoing, Progressing     Problem: Diabetes Comorbidity  Goal: Blood Glucose Level Within Targeted Range  Outcome: Ongoing, Progressing     Problem: Fall Injury Risk  Goal: Absence of Fall and Fall-Related Injury  Outcome:  Ongoing, Progressing

## 2022-02-11 ENCOUNTER — PATIENT OUTREACH (OUTPATIENT)
Dept: ADMINISTRATIVE | Facility: CLINIC | Age: 72
End: 2022-02-11
Payer: MEDICARE

## 2022-02-11 NOTE — PROGRESS NOTES
C3 nurse attempted to contact Chato Bowie for a TCC post hospital discharge follow up call. No answer. No voicemail available.The patient does not have a scheduled HOSFU appointment. Non [facility] PCP.    C3 nurse attempted to contact Chato Bowie for a TCC post hospital discharge follow up call. No answer. No voicemail available.The patient does not have a scheduled HOSFU appointment. Non [facility] PCP.    C3 nurse attempted to contact Chato Bowie for a TCC post hospital discharge follow up call. No answer. No voicemail available.The patient does not have a scheduled HOSFU appointment. Non [facility] PCP.

## 2022-02-23 NOTE — ASSESSMENT & PLAN NOTE
On NovoLog at home.  Will continue with a diabetic diet and sliding scale for now and adjust as needed.  Added  Basal insulin       Immediate family member

## 2022-05-12 ENCOUNTER — HOSPITAL ENCOUNTER (OUTPATIENT)
Facility: HOSPITAL | Age: 72
Discharge: HOME HEALTH | End: 2022-05-14
Attending: INTERNAL MEDICINE | Admitting: HOSPITALIST
Payer: MEDICARE

## 2022-05-12 DIAGNOSIS — R06.02 SHORTNESS OF BREATH: ICD-10-CM

## 2022-05-12 DIAGNOSIS — R07.9 CHEST PAIN, UNSPECIFIED TYPE: ICD-10-CM

## 2022-05-12 DIAGNOSIS — I50.9 ACUTE CONGESTIVE HEART FAILURE, UNSPECIFIED HEART FAILURE TYPE: Primary | ICD-10-CM

## 2022-05-12 PROBLEM — E11.29 DM (DIABETES MELLITUS) TYPE II CONTROLLED WITH RENAL MANIFESTATION: Status: ACTIVE | Noted: 2022-05-12

## 2022-05-12 PROBLEM — E66.9 CLASS 1 OBESITY IN ADULT: Status: ACTIVE | Noted: 2022-05-12

## 2022-05-12 PROBLEM — J96.01 ACUTE RESPIRATORY FAILURE WITH HYPOXIA: Status: ACTIVE | Noted: 2022-05-12

## 2022-05-12 PROBLEM — I50.33 ACUTE ON CHRONIC DIASTOLIC CHF (CONGESTIVE HEART FAILURE): Status: ACTIVE | Noted: 2022-05-12

## 2022-05-12 PROBLEM — J81.0 ACUTE PULMONARY EDEMA: Status: ACTIVE | Noted: 2022-05-12

## 2022-05-12 LAB
ALBUMIN SERPL BCP-MCNC: 3.8 G/DL (ref 3.5–5.2)
ALP SERPL-CCNC: 101 U/L (ref 55–135)
ALT SERPL W/O P-5'-P-CCNC: 17 U/L (ref 10–44)
AMPHET+METHAMPHET UR QL: NEGATIVE
ANION GAP SERPL CALC-SCNC: 12 MMOL/L (ref 8–16)
AST SERPL-CCNC: 20 U/L (ref 10–40)
BARBITURATES UR QL SCN>200 NG/ML: NEGATIVE
BASOPHILS # BLD AUTO: 0.13 K/UL (ref 0–0.2)
BASOPHILS NFR BLD: 1.5 % (ref 0–1.9)
BENZODIAZ UR QL SCN>200 NG/ML: NEGATIVE
BILIRUB SERPL-MCNC: 0.6 MG/DL (ref 0.1–1)
BNP SERPL-MCNC: 424 PG/ML (ref 0–99)
BUN SERPL-MCNC: 35 MG/DL (ref 8–23)
BZE UR QL SCN: NEGATIVE
CALCIUM SERPL-MCNC: 9.7 MG/DL (ref 8.7–10.5)
CANNABINOIDS UR QL SCN: NEGATIVE
CHLORIDE SERPL-SCNC: 99 MMOL/L (ref 95–110)
CO2 SERPL-SCNC: 24 MMOL/L (ref 23–29)
CREAT SERPL-MCNC: 2 MG/DL (ref 0.5–1.4)
CREAT UR-MCNC: 16.3 MG/DL (ref 23–375)
CTP QC/QA: YES
CTP QC/QA: YES
DIFFERENTIAL METHOD: ABNORMAL
EOSINOPHIL # BLD AUTO: 0.2 K/UL (ref 0–0.5)
EOSINOPHIL NFR BLD: 2.9 % (ref 0–8)
ERYTHROCYTE [DISTWIDTH] IN BLOOD BY AUTOMATED COUNT: 13.6 % (ref 11.5–14.5)
EST. GFR  (AFRICAN AMERICAN): 38 ML/MIN/1.73 M^2
EST. GFR  (NON AFRICAN AMERICAN): 33 ML/MIN/1.73 M^2
ESTIMATED AVG GLUCOSE: 151 MG/DL (ref 68–131)
FLUAV AG NPH QL: NEGATIVE
FLUBV AG NPH QL: NEGATIVE
GLUCOSE SERPL-MCNC: 190 MG/DL (ref 70–110)
HBA1C MFR BLD: 6.9 % (ref 4–5.6)
HCT VFR BLD AUTO: 35.7 % (ref 40–54)
HGB BLD-MCNC: 11.6 G/DL (ref 14–18)
IMM GRANULOCYTES # BLD AUTO: 0.05 K/UL (ref 0–0.04)
IMM GRANULOCYTES NFR BLD AUTO: 0.6 % (ref 0–0.5)
LYMPHOCYTES # BLD AUTO: 1.5 K/UL (ref 1–4.8)
LYMPHOCYTES NFR BLD: 17.4 % (ref 18–48)
MAGNESIUM SERPL-MCNC: 1.9 MG/DL (ref 1.6–2.6)
MCH RBC QN AUTO: 25.8 PG (ref 27–31)
MCHC RBC AUTO-ENTMCNC: 32.5 G/DL (ref 32–36)
MCV RBC AUTO: 80 FL (ref 82–98)
METHADONE UR QL SCN>300 NG/ML: NEGATIVE
MONOCYTES # BLD AUTO: 1 K/UL (ref 0.3–1)
MONOCYTES NFR BLD: 11.4 % (ref 4–15)
NEUTROPHILS # BLD AUTO: 5.6 K/UL (ref 1.8–7.7)
NEUTROPHILS NFR BLD: 66.2 % (ref 38–73)
NRBC BLD-RTO: 0 /100 WBC
OPIATES UR QL SCN: NEGATIVE
PCP UR QL SCN>25 NG/ML: NEGATIVE
PLATELET # BLD AUTO: 314 K/UL (ref 150–450)
PMV BLD AUTO: 8.1 FL (ref 9.2–12.9)
POCT GLUCOSE: 276 MG/DL (ref 70–110)
POTASSIUM SERPL-SCNC: 3.6 MMOL/L (ref 3.5–5.1)
PROT SERPL-MCNC: 7.8 G/DL (ref 6–8.4)
RBC # BLD AUTO: 4.49 M/UL (ref 4.6–6.2)
SARS-COV-2 RDRP RESP QL NAA+PROBE: NEGATIVE
SODIUM SERPL-SCNC: 135 MMOL/L (ref 136–145)
TOXICOLOGY INFORMATION: ABNORMAL
TROPONIN I SERPL DL<=0.01 NG/ML-MCNC: 0.02 NG/ML (ref 0–0.03)
WBC # BLD AUTO: 8.41 K/UL (ref 3.9–12.7)

## 2022-05-12 PROCEDURE — 27000190 HC CPAP FULL FACE MASK W/VALVE

## 2022-05-12 PROCEDURE — 25000003 PHARM REV CODE 250: Performed by: HOSPITALIST

## 2022-05-12 PROCEDURE — 63600175 PHARM REV CODE 636 W HCPCS: Performed by: HOSPITALIST

## 2022-05-12 PROCEDURE — 27000221 HC OXYGEN, UP TO 24 HOURS

## 2022-05-12 PROCEDURE — 96374 THER/PROPH/DIAG INJ IV PUSH: CPT

## 2022-05-12 PROCEDURE — 96376 TX/PRO/DX INJ SAME DRUG ADON: CPT

## 2022-05-12 PROCEDURE — U0002 COVID-19 LAB TEST NON-CDC: HCPCS | Performed by: INTERNAL MEDICINE

## 2022-05-12 PROCEDURE — 63600175 PHARM REV CODE 636 W HCPCS: Performed by: INTERNAL MEDICINE

## 2022-05-12 PROCEDURE — 21400001 HC TELEMETRY ROOM

## 2022-05-12 PROCEDURE — G0378 HOSPITAL OBSERVATION PER HR: HCPCS

## 2022-05-12 PROCEDURE — 87502 INFLUENZA DNA AMP PROBE: CPT

## 2022-05-12 PROCEDURE — 99900035 HC TECH TIME PER 15 MIN (STAT)

## 2022-05-12 PROCEDURE — 82962 GLUCOSE BLOOD TEST: CPT

## 2022-05-12 PROCEDURE — 96372 THER/PROPH/DIAG INJ SC/IM: CPT | Mod: 59 | Performed by: HOSPITALIST

## 2022-05-12 PROCEDURE — 25000242 PHARM REV CODE 250 ALT 637 W/ HCPCS: Performed by: HOSPITALIST

## 2022-05-12 PROCEDURE — 93005 ELECTROCARDIOGRAM TRACING: CPT

## 2022-05-12 PROCEDURE — 85025 COMPLETE CBC W/AUTO DIFF WBC: CPT | Performed by: INTERNAL MEDICINE

## 2022-05-12 PROCEDURE — 96375 TX/PRO/DX INJ NEW DRUG ADDON: CPT

## 2022-05-12 PROCEDURE — 93010 ELECTROCARDIOGRAM REPORT: CPT | Mod: ,,, | Performed by: INTERNAL MEDICINE

## 2022-05-12 PROCEDURE — 94640 AIRWAY INHALATION TREATMENT: CPT

## 2022-05-12 PROCEDURE — 84484 ASSAY OF TROPONIN QUANT: CPT | Performed by: INTERNAL MEDICINE

## 2022-05-12 PROCEDURE — 93010 EKG 12-LEAD: ICD-10-PCS | Mod: ,,, | Performed by: INTERNAL MEDICINE

## 2022-05-12 PROCEDURE — 83036 HEMOGLOBIN GLYCOSYLATED A1C: CPT | Performed by: HOSPITALIST

## 2022-05-12 PROCEDURE — 99285 EMERGENCY DEPT VISIT HI MDM: CPT | Mod: 25,CS

## 2022-05-12 PROCEDURE — 80307 DRUG TEST PRSMV CHEM ANLYZR: CPT | Performed by: INTERNAL MEDICINE

## 2022-05-12 PROCEDURE — 83880 ASSAY OF NATRIURETIC PEPTIDE: CPT | Performed by: INTERNAL MEDICINE

## 2022-05-12 PROCEDURE — 83735 ASSAY OF MAGNESIUM: CPT | Performed by: HOSPITALIST

## 2022-05-12 PROCEDURE — 25000242 PHARM REV CODE 250 ALT 637 W/ HCPCS: Performed by: INTERNAL MEDICINE

## 2022-05-12 PROCEDURE — 94660 CPAP INITIATION&MGMT: CPT

## 2022-05-12 PROCEDURE — 80053 COMPREHEN METABOLIC PANEL: CPT | Performed by: INTERNAL MEDICINE

## 2022-05-12 RX ORDER — GLUCAGON 1 MG
1 KIT INJECTION
Status: DISCONTINUED | OUTPATIENT
Start: 2022-05-12 | End: 2022-05-14 | Stop reason: HOSPADM

## 2022-05-12 RX ORDER — ASPIRIN 81 MG/1
81 TABLET ORAL DAILY
Status: DISCONTINUED | OUTPATIENT
Start: 2022-05-12 | End: 2022-05-14 | Stop reason: HOSPADM

## 2022-05-12 RX ORDER — INSULIN ASPART 100 [IU]/ML
0-5 INJECTION, SOLUTION INTRAVENOUS; SUBCUTANEOUS
Status: DISCONTINUED | OUTPATIENT
Start: 2022-05-12 | End: 2022-05-14 | Stop reason: HOSPADM

## 2022-05-12 RX ORDER — FUROSEMIDE 10 MG/ML
80 INJECTION INTRAMUSCULAR; INTRAVENOUS
Status: COMPLETED | OUTPATIENT
Start: 2022-05-12 | End: 2022-05-12

## 2022-05-12 RX ORDER — IPRATROPIUM BROMIDE AND ALBUTEROL SULFATE 2.5; .5 MG/3ML; MG/3ML
3 SOLUTION RESPIRATORY (INHALATION)
Status: COMPLETED | OUTPATIENT
Start: 2022-05-12 | End: 2022-05-12

## 2022-05-12 RX ORDER — AMIODARONE HYDROCHLORIDE 200 MG/1
200 TABLET ORAL DAILY
Status: DISCONTINUED | OUTPATIENT
Start: 2022-05-12 | End: 2022-05-14 | Stop reason: HOSPADM

## 2022-05-12 RX ORDER — IPRATROPIUM BROMIDE AND ALBUTEROL SULFATE 2.5; .5 MG/3ML; MG/3ML
3 SOLUTION RESPIRATORY (INHALATION)
Status: DISCONTINUED | OUTPATIENT
Start: 2022-05-12 | End: 2022-05-14 | Stop reason: HOSPADM

## 2022-05-12 RX ORDER — FAMOTIDINE 20 MG/1
20 TABLET, FILM COATED ORAL DAILY
Status: DISCONTINUED | OUTPATIENT
Start: 2022-05-12 | End: 2022-05-14 | Stop reason: HOSPADM

## 2022-05-12 RX ORDER — IBUPROFEN 200 MG
16 TABLET ORAL
Status: DISCONTINUED | OUTPATIENT
Start: 2022-05-12 | End: 2022-05-14 | Stop reason: HOSPADM

## 2022-05-12 RX ORDER — SODIUM CHLORIDE 0.9 % (FLUSH) 0.9 %
10 SYRINGE (ML) INJECTION
Status: DISCONTINUED | OUTPATIENT
Start: 2022-05-12 | End: 2022-05-14 | Stop reason: HOSPADM

## 2022-05-12 RX ORDER — IBUPROFEN 200 MG
24 TABLET ORAL
Status: DISCONTINUED | OUTPATIENT
Start: 2022-05-12 | End: 2022-05-14 | Stop reason: HOSPADM

## 2022-05-12 RX ORDER — TAMSULOSIN HYDROCHLORIDE 0.4 MG/1
0.4 CAPSULE ORAL DAILY
Status: DISCONTINUED | OUTPATIENT
Start: 2022-05-12 | End: 2022-05-14 | Stop reason: HOSPADM

## 2022-05-12 RX ORDER — METHYLPREDNISOLONE SOD SUCC 125 MG
125 VIAL (EA) INJECTION
Status: COMPLETED | OUTPATIENT
Start: 2022-05-12 | End: 2022-05-12

## 2022-05-12 RX ORDER — METOPROLOL SUCCINATE 50 MG/1
50 TABLET, EXTENDED RELEASE ORAL DAILY
Status: DISCONTINUED | OUTPATIENT
Start: 2022-05-12 | End: 2022-05-14 | Stop reason: HOSPADM

## 2022-05-12 RX ORDER — HYDRALAZINE HYDROCHLORIDE 25 MG/1
25 TABLET, FILM COATED ORAL EVERY 8 HOURS
Status: DISCONTINUED | OUTPATIENT
Start: 2022-05-12 | End: 2022-05-13

## 2022-05-12 RX ORDER — ATORVASTATIN CALCIUM 40 MG/1
40 TABLET, FILM COATED ORAL DAILY
Status: DISCONTINUED | OUTPATIENT
Start: 2022-05-12 | End: 2022-05-14 | Stop reason: HOSPADM

## 2022-05-12 RX ORDER — FUROSEMIDE 10 MG/ML
80 INJECTION INTRAMUSCULAR; INTRAVENOUS
Status: DISCONTINUED | OUTPATIENT
Start: 2022-05-12 | End: 2022-05-14 | Stop reason: HOSPADM

## 2022-05-12 RX ADMIN — HYDRALAZINE HYDROCHLORIDE 25 MG: 25 TABLET, FILM COATED ORAL at 09:05

## 2022-05-12 RX ADMIN — HYDRALAZINE HYDROCHLORIDE 25 MG: 25 TABLET, FILM COATED ORAL at 02:05

## 2022-05-12 RX ADMIN — IPRATROPIUM BROMIDE AND ALBUTEROL SULFATE 3 ML: 2.5; .5 SOLUTION RESPIRATORY (INHALATION) at 05:05

## 2022-05-12 RX ADMIN — FUROSEMIDE 80 MG: 10 INJECTION, SOLUTION INTRAMUSCULAR; INTRAVENOUS at 09:05

## 2022-05-12 RX ADMIN — TAMSULOSIN HYDROCHLORIDE 0.4 MG: 0.4 CAPSULE ORAL at 09:05

## 2022-05-12 RX ADMIN — METHYLPREDNISOLONE SODIUM SUCCINATE 125 MG: 125 INJECTION, POWDER, FOR SOLUTION INTRAMUSCULAR; INTRAVENOUS at 05:05

## 2022-05-12 RX ADMIN — ASPIRIN 81 MG: 81 TABLET, DELAYED RELEASE ORAL at 09:05

## 2022-05-12 RX ADMIN — METOPROLOL SUCCINATE 50 MG: 50 TABLET, EXTENDED RELEASE ORAL at 09:05

## 2022-05-12 RX ADMIN — AMIODARONE HYDROCHLORIDE 200 MG: 200 TABLET ORAL at 09:05

## 2022-05-12 RX ADMIN — IPRATROPIUM BROMIDE AND ALBUTEROL SULFATE 3 ML: 2.5; .5 SOLUTION RESPIRATORY (INHALATION) at 09:05

## 2022-05-12 RX ADMIN — FAMOTIDINE 20 MG: 20 TABLET ORAL at 09:05

## 2022-05-12 RX ADMIN — FUROSEMIDE 80 MG: 10 INJECTION, SOLUTION INTRAMUSCULAR; INTRAVENOUS at 05:05

## 2022-05-12 RX ADMIN — ATORVASTATIN CALCIUM 40 MG: 40 TABLET, FILM COATED ORAL at 09:05

## 2022-05-12 RX ADMIN — INSULIN ASPART 1 UNITS: 100 INJECTION, SOLUTION INTRAVENOUS; SUBCUTANEOUS at 09:05

## 2022-05-12 RX ADMIN — IPRATROPIUM BROMIDE AND ALBUTEROL SULFATE 3 ML: 2.5; .5 SOLUTION RESPIRATORY (INHALATION) at 07:05

## 2022-05-12 NOTE — ASSESSMENT & PLAN NOTE
Nutrition consulted. Most recent weight and BMI monitored-                         Measurements:  Wt Readings from Last 1 Encounters:   05/12/22 98.9 kg (218 lb)   Body mass index is 30.4 kg/m².    Recommendations:      Patient has been screened and assessed by RD. RD will follow patient.

## 2022-05-12 NOTE — ASSESSMENT & PLAN NOTE
Body mass index is 30.4 kg/m². Morbid obesity complicates all aspects of disease management from diagnostic modalities to treatment. Weight loss encouraged and health benefits explained to patient.

## 2022-05-12 NOTE — PLAN OF CARE
[place] - Emergency Dept  Initial Discharge Assessment       Primary Care Provider: Irlanda Montes [facility]    Admission Diagnosis: Acute congestive heart failure, unspecified heart failure type [I50.9]    Admission Date: 5/12/2022  Expected Discharge Date:     Discharge Barriers Identified: None    Payor: [employer] MANAGED MEDICARE / Plan: [employer] SNP (SPECIAL NEEDS PLAN) / Product Type: Medicare Advantage /     Extended Emergency Contact Information  Primary Emergency Contact: JamesDulce   [facility]  Home Phone: [phone]  Relation: Sister    Discharge Plan A: Home Health  Discharge Plan B: Home      [id number] - [place], [place] - [address]  [address]  [facility] [address]  Phone: [phone] Fax: [phone]    [facility] [address] [place], [place] - [address]  [address]  [facility] [address]  Phone: [phone] Fax: [phone]    [facility]  [address]  [address]   [facility] [address]  Phone: [phone] Fax: [phone]    Living Situation:  From Home Alone    Services:  Home Health    Discharge Needs:  Resume HH, PCP followup (next PCP appt scheduled for 5/23/2022)    Initial Assessment (most recent)       Adult Discharge Assessment - 05/12/22 1156          Discharge Assessment    Assessment Type Discharge Planning Assessment     Confirmed/corrected address, phone number and insurance Yes     Confirmed Demographics Correct on Facesheet     Source of Information patient;health record     When was your last doctors appointment? --   Two Weeks ago    Communicated ELY with patient/caregiver No     Reason For Admission Acute Congestive Heart Failure     Lives With alone     Facility Arrived From: home     Do you expect to return to your current living situation? Yes     Do you have help at home or someone to help you manage your care at home? No   Lives alone.    Prior to  hospitilization cognitive status: Alert/Oriented     Current cognitive status: Alert/Oriented     Walking or Climbing Stairs Difficulty ambulation difficulty, requires equipment;stair climbing difficulty, dependent     Mobility Management Cane and rollator     Dressing/Bathing Difficulty none     Equipment Currently Used at Home glucometer;rollator;cane, straight     Readmission within 30 days? No     Patient currently being followed by outpatient case management? No     Do you currently have service(s) that help you manage your care at home? Yes     Name and Contact number of agency Home Health (?[facility]     Is the pt/caregiver preference to resume services with current agency Yes     Do you take prescription medications? Yes     Do you have prescription coverage? Yes     Coverage [employer] Managed Medicare     Do you have any problems affording any of your prescribed medications? No     Is the patient taking medications as prescribed? yes     Who is going to help you get home at discharge? TBD     How do you get to doctors appointments? health plan transportation     Are you on dialysis? No     Do you take coumadin? No     Discharge Plan A Home Health     Discharge Plan B Home     DME Needed Upon Discharge  none     Discharge Plan discussed with: Patient     Discharge Barriers Identified None

## 2022-05-12 NOTE — ASSESSMENT & PLAN NOTE
Patient with Hypoxic Respiratory failure which is Acute.  he is not on home oxygen. Supplemental oxygen was provided and noted- Oxygen Concentration (%):  [40] 40.   Signs/symptoms of respiratory failure include- tachypnea, increased work of breathing and respiratory distress. Contributing diagnoses includes - CHF Labs and images were reviewed. Patient Has not had a recent ABG. Will treat underlying causes and adjust management of respiratory failure as follows-   Off bipap  and is on NC O 2 at this time,.

## 2022-05-12 NOTE — ASSESSMENT & PLAN NOTE
chest X ray show acute pulmonary edema,he received IV lasix,nebulizer and IV Solumedrol,was started on supplemental oxygen via Bipap his respiratory status is improved,urinated well, and he has been switched to NC O 2,his chest discomfort is improved,has normal troponin.patient is admitted to hospital medicine,started on CHF protocol.

## 2022-05-12 NOTE — H&P
[facility] Emergency Dept  [facility] Medicine  History & Physical    Patient Name: Chato Bowie  MRN: [id number]  Patient Class: IP- Inpatient  Admission Date: 5/12/2022  Attending Physician: Raoul Morse    Primary Care Provider: Irlanda Valenzuela         Patient information was obtained from patient and ER records.     Subjective:     Principal Problem:Acute on chronic diastolic CHF (congestive heart failure)    Chief Complaint:   Chief Complaint   Patient presents with    Shortness of Breath     Pt arrived with NOEMS with complaints of SOB x 3 days        HPI: 71-year-old male with past medical history significant for diastolic congestive heart failure, alcohol abuse ,asthma, coronary artery disease, CKD 4,diabetes mellitus type 2 and hypertension presents to the emergency department complaining progressively worsening shortness of breath x3 days.  He also complains of intermittent chest pain since this morning.  The he describes chest pain as a pressure-like sensation, nonradiating and without exacerbating or alleviating factors. He denies nausea/vomiting/fever/chills.chest X ray show acute pulmonary edema,he received IV lasix,nebulizer and IV Solumedrol,was started on supplemental oxygen via Bipap his respiratory status is improved,urinated well, and he has been switched to NC O 2,his chest discomfort is improved,has normal troponin.patient is admitted to hospital medicine,started on CHF protocol.      Past Medical History:   Diagnosis Date    Acute congestive heart failure 3/20/2020    Alcohol abuse     Arthritis     Asthma attack 11/24/2021    Coronary artery disease     Diabetes mellitus     Hyperlipemia     Hypertension     Rhabdomyolysis        Past Surgical History:   Procedure Laterality Date    EYE SURGERY      TREATMENT OF CARDIAC ARRHYTHMIA N/A 12/7/2020    Procedure: Cardioversion or Defibrillation;  Surgeon: Indra Foote MD;  Location: [facility] CATH LAB;  Service: Cardiology;   Laterality: N/A;    TREATMENT OF CARDIAC ARRHYTHMIA N/A 12/30/2020    Procedure: Cardioversion or Defibrillation;  Surgeon: Tyrone Steen MD;  Location: [facility] CATH LAB;  Service: Cardiology;  Laterality: N/A;    VASCULAR SURGERY         Review of patient's allergies indicates:  No Known Allergies    No current facility-administered medications on file prior to encounter.     Current Outpatient Medications on File Prior to Encounter   Medication Sig    albuterol (PROVENTIL/VENTOLIN HFA) 90 mcg/actuation inhaler Inhale 2 puffs into the lungs every 4 (four) hours as needed.    amiodarone (PACERONE) 200 MG Tab Take 1 tablet (200 mg total) by mouth once daily.    aspirin 81 MG Chew Take 1 tablet (81 mg total) by mouth once daily.    DUREZOL 0.05 % Drop ophthalmic solution INT 1 GTT INTO OD FID FOR 3 WEEKS    famotidine (PEPCID) 40 MG tablet Take 40 mg by mouth once daily.    furosemide (LASIX) 80 MG tablet Take 1 tablet (80 mg total) by mouth 2 (two) times daily.    hydrALAZINE (APRESOLINE) 25 MG tablet Take 2 tablets (50 mg total) by mouth every 8 (eight) hours.    insulin aspart U-100 (NOVOLOG) 100 unit/mL (3 mL) InPn pen Inject 5 Units into the skin 3 (three) times daily.    magnesium oxide (MAG-OX) 400 mg (241.3 mg magnesium) tablet Take 800 mg by mouth.    metOLazone (ZAROXOLYN) 5 MG tablet TAKE 1 TABLET BY MOUTH 3 TIMES A WEEK AS NEEDED FOR SEVERE LEG SWELLING    metoprolol succinate (TOPROL-XL) 50 MG 24 hr tablet Take 1 tablet (50 mg total) by mouth once daily.    multivitamin Tab Take 1 tablet by mouth once daily.    potassium chloride SA (K-DUR,KLOR-CON) 20 MEQ tablet Take 2 tablets by mouth once daily.    rosuvastatin (CRESTOR) 20 MG tablet Take 20 mg by mouth once daily.    tamsulosin (FLOMAX) 0.4 mg Cap Take 1 capsule (0.4 mg total) by mouth once daily.     Family History       Problem Relation (Age of Onset)    Diabetes Mother, Father, Sister    Hypertension Mother, Father, Sister           Tobacco Use    Smoking status: Never Smoker    Smokeless tobacco: Never Used   Substance and Sexual Activity    Alcohol use: Not Currently     Alcohol/week: 4.0 standard drinks     Types: 4 Cans of beer per week    Drug use: No    Sexual activity: Yes     Partners: Female     Review of Systems   Constitutional:  Positive for activity change and appetite change.   HENT:  Negative for congestion and dental problem.    Eyes:  Negative for discharge and itching.   Respiratory:  Positive for chest tightness and shortness of breath.    Cardiovascular:  Negative for chest pain and leg swelling.   Gastrointestinal:  Negative for abdominal distention and abdominal pain.   Endocrine: Negative for cold intolerance.   Genitourinary:  Negative for difficulty urinating and dysuria.   Musculoskeletal:  Negative for arthralgias and back pain.   Skin:  Negative for color change and pallor.   Neurological:  Negative for dizziness and facial asymmetry.   Hematological:  Negative for adenopathy. Does not bruise/bleed easily.   Psychiatric/Behavioral:  Negative for agitation and behavioral problems.    Objective:     Vital Signs (Most Recent):  Temp: 97.8 °F (36.6 °C) (05/12/22 0538)  Pulse: 89 (05/12/22 0702)  Resp: 19 (05/12/22 0702)  BP: (!) 170/96 (05/12/22 0702)  SpO2: 100 % (05/12/22 0702)   Vital Signs (24h Range):  Temp:  [97.8 °F (36.6 °C)] 97.8 °F (36.6 °C)  Pulse:  [68-89] 89  Resp:  [17-23] 19  SpO2:  [99 %-100 %] 100 %  BP: (148-186)/(90-96) 170/96        There is no height or weight on file to calculate BMI.    Physical Exam  Constitutional:       Appearance: Normal appearance.   HENT:      Head: Normocephalic and atraumatic.      Nose: Nose normal.      Mouth/Throat:      Mouth: Mucous membranes are moist.      Pharynx: No oropharyngeal exudate.   Eyes:      Extraocular Movements: Extraocular movements intact.      Pupils: Pupils are equal, round, and reactive to light.   Cardiovascular:      Rate and Rhythm:  Normal rate and regular rhythm.   Pulmonary:      Effort: Pulmonary effort is normal.      Breath sounds: Rhonchi and rales present.   Abdominal:      General: Abdomen is flat. There is no distension.      Palpations: Abdomen is soft.      Tenderness: There is no abdominal tenderness.   Musculoskeletal:         General: Swelling present. Normal range of motion.   Skin:     Coloration: Skin is not jaundiced.   Neurological:      Mental Status: He is alert and oriented to person, place, and time.      Cranial Nerves: No cranial nerve deficit.   Psychiatric:         Mood and Affect: Mood normal.         Behavior: Behavior normal.         CRANIAL NERVES     CN III, IV, VI   Pupils are equal, round, and reactive to light.     Significant Labs: All pertinent labs within the past 24 hours have been reviewed.  BMP:   Recent Labs   Lab 05/12/22  0507   *   *   K 3.6   CL 99   CO2 24   BUN 35*   CREATININE 2.0*   CALCIUM 9.7     CBC:   Recent Labs   Lab 05/12/22  0507   WBC 8.41   HGB 11.6*   HCT 35.7*        CMP:   Recent Labs   Lab 05/12/22  0507   *   K 3.6   CL 99   CO2 24   *   BUN 35*   CREATININE 2.0*   CALCIUM 9.7   PROT 7.8   ALBUMIN 3.8   BILITOT 0.6   ALKPHOS 101   AST 20   ALT 17   ANIONGAP 12   EGFRNONAA 33*       Significant Imaging: I have reviewed all pertinent imaging results/findings within the past 24 hours.    Assessment/Plan:     * Acute on chronic diastolic CHF (congestive heart failure)  chest X ray show acute pulmonary edema,he received IV lasix,nebulizer and IV Solumedrol,was started on supplemental oxygen via Bipap his respiratory status is improved,urinated well, and he has been switched to NC O 2,his chest discomfort is improved,has normal troponin.patient is admitted to hospital medicine,started on CHF protocol.      Class 1 obesity in adult  Body mass index is 30.4 kg/m². Morbid obesity complicates all aspects of disease management from diagnostic modalities to  treatment. Weight loss encouraged and health benefits explained to patient.         DM (diabetes mellitus) type II controlled with renal manifestation  On SSI.      Acute pulmonary edema  Duo to CHF,on IV lasix.      Acute respiratory failure with hypoxia  Patient with Hypoxic Respiratory failure which is Acute.  he is not on home oxygen. Supplemental oxygen was provided and noted- Oxygen Concentration (%):  [40] 40.   Signs/symptoms of respiratory failure include- tachypnea, increased work of breathing and respiratory distress. Contributing diagnoses includes - CHF Labs and images were reviewed. Patient Has not had a recent ABG. Will treat underlying causes and adjust management of respiratory failure as follows-   Off bipap  and is on NC O 2 at this time,.    Anasarca  Duo to CHF ,on IV lasix,      CKD (chronic kidney disease), stage IV  Will monitor.      Paroxysmal atrial flutter  Will monitor  on Tele,sinus at this time,not on OAC.      Aortic stenosis, moderate  Per echo,on medical treatment.      BPH (benign prostatic hyperplasia)  On Flomax.      Alcohol abuse  Has been consulted.      Mild protein malnutrition  Nutrition consulted. Most recent weight and BMI monitored-                         Measurements:  Wt Readings from Last 1 Encounters:   05/12/22 98.9 kg (218 lb)   Body mass index is 30.4 kg/m².    Recommendations:      Patient has been screened and assessed by RD. RD will follow patient.      Anemia of chronic disease  Will monitor.      Hyperlipidemia  On statin.      Essential hypertension  Resumed home  Medications.      CAD (coronary artery disease)  Will continue with medical treatment.        VTE Risk Mitigation (From admission, onward)         Ordered     IP VTE HIGH RISK PATIENT  Once         05/12/22 0807     Place sequential compression device  Until discontinued         05/12/22 0807                   Raoul Morse MD  Department of Hospital Medicine   [facility] - Emergency  Dept

## 2022-05-12 NOTE — CONSULTS
Food & Nutrition  Education    Diet Education: Low Na, FR, CKD  Time Spent: 15 Minutes  Learners: Patient      Nutrition Education provided with handouts:   Heart Failure Nutrition Therapy, FR  + Clinical Reference attachments to d/c documents      Comments: Pt not seen at this time due to location (ED). Will follow up for IP education once transferred to floor.    5/13 - Spoke with patient, stated he does his own cooking and loves alone at home. Discussed Low Na, FR diet needs to manage CHF symptoms. Provided strategies to help minimize Na intake and ways to limit fluids + foods that count towards fluids. Pt verbalized understanding.    All questions and concerns answered. Dietitian's contact information provided.   Please Re-consult as needed  Thanks!

## 2022-05-12 NOTE — HPI
71-year-old male with past medical history significant for diastolic congestive heart failure, alcohol abuse ,asthma, coronary artery disease, CKD 4,diabetes mellitus type 2 and hypertension presents to the emergency department complaining progressively worsening shortness of breath x3 days.  He also complains of intermittent chest pain since this morning.  The he describes chest pain as a pressure-like sensation, nonradiating and without exacerbating or alleviating factors. He denies nausea/vomiting/fever/chills.chest X ray show acute pulmonary edema,he received IV lasix,nebulizer and IV Solumedrol,was started on supplemental oxygen via Bipap his respiratory status is improved,urinated well, and he has been switched to NC O 2,his chest discomfort is improved,has normal troponin.patient is admitted to hospital medicine,started on CHF protocol.

## 2022-05-12 NOTE — ED PROVIDER NOTES
Encounter Date: 5/12/2022       History     Chief Complaint   Patient presents with    Shortness of Breath     Pt arrived with NOEMS with complaints of SOB x 3 days     71-year-old male with past medical history significant for congestive heart failure, alcohol abuse asthma, coronary artery disease, diabetes mellitus type 2 and hypertension presents to the emergency department complaining progressively worsening shortness of breath x3 days.  He also complains of intermittent chest pain since this morning.  The he describes chest pain as a pressure-like sensation, nonradiating and without exacerbating or alleviating factors. He denies nausea/vomiting/fever/chills.    The history is provided by the patient. No  was used.     Review of patient's allergies indicates:  No Known Allergies  Past Medical History:   Diagnosis Date    Acute congestive heart failure 3/20/2020    Alcohol abuse     Arthritis     Asthma attack 11/24/2021    Coronary artery disease     Diabetes mellitus     Hyperlipemia     Hypertension     Rhabdomyolysis      Past Surgical History:   Procedure Laterality Date    EYE SURGERY      TREATMENT OF CARDIAC ARRHYTHMIA N/A 12/7/2020    Procedure: Cardioversion or Defibrillation;  Surgeon: Indra Foote MD;  Location: [facility] CATH LAB;  Service: Cardiology;  Laterality: N/A;    TREATMENT OF CARDIAC ARRHYTHMIA N/A 12/30/2020    Procedure: Cardioversion or Defibrillation;  Surgeon: Tyrone Steen MD;  Location: [facility] CATH LAB;  Service: Cardiology;  Laterality: N/A;    VASCULAR SURGERY       Family History   Problem Relation Age of Onset    Hypertension Mother     Diabetes Mother     Diabetes Father     Hypertension Father     Diabetes Sister     Hypertension Sister      Social History     Tobacco Use    Smoking status: Never Smoker    Smokeless tobacco: Never Used   Substance Use Topics    Alcohol use: Not Currently     Alcohol/week: 4.0 standard drinks      Types: 4 Cans of beer per week    Drug use: No     Review of Systems   Constitutional: Negative for chills and fever.   HENT: Negative for congestion.    Respiratory: Positive for shortness of breath.    Cardiovascular: Positive for leg swelling. Negative for chest pain and palpitations.   Gastrointestinal: Negative for abdominal pain, diarrhea, nausea and vomiting.   All other systems reviewed and are negative.      Physical Exam     Initial Vitals   BP Pulse Resp Temp SpO2   05/12/22 0508 05/12/22 0508 05/12/22 0508 05/12/22 0538 05/12/22 0508   (!) 186/94 87 20 97.8 °F (36.6 °C) 100 %      MAP       --                Physical Exam    Nursing note and vitals reviewed.  Constitutional: He is not diaphoretic. No distress.   HENT:   Head: Normocephalic and atraumatic.   Right Ear: External ear normal.   Left Ear: External ear normal.   Mouth/Throat: Oropharynx is clear and moist.   Eyes: Conjunctivae are normal.   Neck:   Normal range of motion.  Cardiovascular: Normal rate, regular rhythm and intact distal pulses.   Pulmonary/Chest: He has wheezes (Occasional expiratory wheezes bilaterally).   Abdominal: Abdomen is soft. Bowel sounds are normal.   Musculoskeletal:         General: Edema (Bilateral lower extremity pitting edema) present. No tenderness. Normal range of motion.      Cervical back: Normal range of motion.     Neurological: He is alert and oriented to person, place, and time. He has normal strength.   Skin: Skin is warm and dry. Capillary refill takes less than 2 seconds.   Psychiatric: He has a normal mood and affect. Thought content normal.         ED Course   Critical Care    Date/Time: 5/12/2022 6:44 AM  Performed by: Cam Ho MD  Authorized by: Cam Ho MD   Direct patient critical care time: 10 minutes  Total critical care time (exclusive of procedural time) : 10 minutes        Labs Reviewed   CBC W/ AUTO DIFFERENTIAL - Abnormal; Notable for the following components:       Result  Value    RBC 4.49 (*)     Hemoglobin 11.6 (*)     Hematocrit 35.7 (*)     MCV 80 (*)     MCH 25.8 (*)     MPV 8.1 (*)     Immature Granulocytes 0.6 (*)     Immature Grans (Abs) 0.05 (*)     Lymph % 17.4 (*)     All other components within normal limits   COMPREHENSIVE METABOLIC PANEL - Abnormal; Notable for the following components:    Sodium 135 (*)     Glucose 190 (*)     BUN 35 (*)     Creatinine 2.0 (*)     eGFR if  38 (*)     eGFR if non  33 (*)     All other components within normal limits   DRUG SCREEN PANEL, URINE EMERGENCY - Abnormal; Notable for the following components:    Creatinine, Urine 16.3 (*)     All other components within normal limits    Narrative:     Specimen Source->Urine   B-TYPE NATRIURETIC PEPTIDE - Abnormal; Notable for the following components:     (*)     All other components within normal limits   HEMOGLOBIN A1C - Abnormal; Notable for the following components:    Hemoglobin A1C 6.9 (*)     Estimated Avg Glucose 151 (*)     All other components within normal limits   TROPONIN I   MAGNESIUM   POCT INFLUENZA A/B   SARS-COV-2 RDRP GENE   POCT GLUCOSE MONITORING CONTINUOUS     EKG Readings: (Independently Interpreted)   Initial Reading: No STEMI. Heart Rate: 99. Conduction: LBBB. T Waves: Normal.   Unchanged from previous EKG on February 5, 2022     ECG Results          EKG 12-lead (Final result)  Result time 05/12/22 16:36:11    Final result by Interface, Lab In [facility] (05/12/22 16:36:11)                 Narrative:    Test Reason : R06.02,    Vent. Rate : 099 BPM     Atrial Rate : 077 BPM     P-R Int : 000 ms          QRS Dur : 146 ms      QT Int : 454 ms       P-R-T Axes : 000 001 103 degrees     QTc Int : 582 ms    Sinus tachycardia  Left bundle branch block  Abnormal ECG  When compared with ECG of 05-FEB-2022 02:09,  Significant changes have occurred  Confirmed by Joshua Llamas MD ([id number]) on 5/12/2022 4:36:00 PM    Referred By: ERVIN   SELF    "        Confirmed By:Joshua Llamas MD                            Imaging Results          X-Ray Chest AP Portable (Final result)  Result time 05/12/22 06:09:16    Final result by Gee Sood MD (05/12/22 06:09:16)                 Impression:      Cardiomegaly and probable mild interstitial edema.  Aeration is similar to mildly improved when compared with 02/05/2022.      Electronically signed by: Gee Sood MD  Date:    05/12/2022  Time:    06:09             Narrative:    EXAMINATION:  XR CHEST AP PORTABLE    CLINICAL HISTORY:  Provided history is "CHF;  ".    TECHNIQUE:  One view of the chest.    COMPARISON:  02/05/2022.    FINDINGS:  Cardiac wires overlie the chest.  Cardiomediastinal silhouette is enlarged and similar to the prior study.  There is central vascular congestion and coarsened interstitial lung markings.  Questionable trace pleural effusions.  No distinct pneumothorax.                                 Medications   sodium chloride 0.9% flush 10 mL (has no administration in time range)   furosemide injection 80 mg (80 mg Intravenous Given 5/12/22 2107)   albuterol-ipratropium 2.5 mg-0.5 mg/3 mL nebulizer solution 3 mL (3 mLs Nebulization Given 5/12/22 1944)   aspirin EC tablet 81 mg (81 mg Oral Given 5/12/22 0908)   amiodarone tablet 200 mg (200 mg Oral Given 5/12/22 0910)   famotidine tablet 20 mg (20 mg Oral Given 5/12/22 0910)   metoprolol succinate (TOPROL-XL) 24 hr tablet 50 mg (50 mg Oral Given 5/12/22 0915)   atorvastatin tablet 40 mg (40 mg Oral Given 5/12/22 0907)   tamsulosin 24 hr capsule 0.4 mg (0.4 mg Oral Given 5/12/22 0909)   hydrALAZINE tablet 25 mg (25 mg Oral Given 5/12/22 2107)   glucose chewable tablet 16 g (has no administration in time range)   glucose chewable tablet 24 g (has no administration in time range)   glucagon (human recombinant) injection 1 mg (has no administration in time range)   dextrose 10% bolus 125 mL (has no administration in time range)   dextrose " 10% bolus 250 mL (has no administration in time range)   insulin aspart U-100 pen 0-5 Units (1 Units Subcutaneous Given 5/12/22 2112)   furosemide injection 80 mg (80 mg Intravenous Given 5/12/22 0502)   methylPREDNISolone sodium succinate injection 125 mg (125 mg Intravenous Given 5/12/22 0502)   albuterol-ipratropium 2.5 mg-0.5 mg/3 mL nebulizer solution 3 mL (3 mLs Nebulization Given 5/12/22 1152)     Medical Decision Making:   Initial Assessment:   71-year-old male with past medical history significant for congestive heart failure, alcohol abuse asthma, coronary artery disease, diabetes mellitus type 2 and hypertension presents to the emergency department complaining progressively worsening shortness of breath x3 days.  He also complains of intermittent chest pain since this morning.  The he describes chest pain as a pressure-like sensation, nonradiating and without exacerbating or alleviating factors. He denies nausea/vomiting/fever/chills.  Clinical Tests:   Lab Tests: Ordered  Radiological Study: Ordered  ED Management:  Course of ED stay:  1. Cardiovascular-patient has been hemodynamically stable.  Troponin and BNP were ordered.  First troponin is normal.  BNP is 424. EKG shows old left bundle with no acute changes.  Patient states chest discomfort improved after BiPAP/DuoNeb treatment.  Heart score is 6.  2. Pulmonary-chest x-ray shows signs of pulmonary edema.  Lasix was given in the ED and patient started diuresing.  Respiratory distress improved after BiPAP/Solu-Medrol/DuoNeb.  Patient currently on BiPAP with settings of 10/5.  3. Hematology/infectious disease-patient is afebrile with normal white count.  4. GI/nutrition/renal-CMP shows elevated BUN/creatinine (35/2), but is stable from previous levels.  ICU hospitalist service was consulted for admission of this patient with CHF exacerbation and chest pain.  Patient was accepted to Dr. Miller's service for further evaluation and  treatment.    Additional MDM:   Heart Score:    History:          Moderately suspicious.  ECG:             Nonspecific repolarisation disturbance  Age:               >65 years  Risk factors: >= 3 risk factors or history of atherosclerotic disease  Troponin:       Less than or equal to normal limit  Final Score: 6                       Clinical Impression:   Final diagnoses:  [R06.02] Shortness of breath  [I50.9] Acute congestive heart failure, unspecified heart failure type (Primary)  [R07.9] Chest pain, unspecified type          ED Disposition Condition    Admit               Cam Ho MD  05/13/22 0349

## 2022-05-12 NOTE — NURSING TRANSFER
Nursing Transfer Note      5/12/2022     Reason patient is being transferred: further eval    Transfer From: ED    Transfer via stretcher    Transfer with cardiac monitoring and o2 therapy    Transported by transport    Medicines sent: no    Any special needs or follow-up needed: no    Chart send with patient: Yes    Notified: no family per patient    Patient reassessed at: 5/12/2022     Upon arrival to floor: cardiac monitor applied, patient oriented to room, call bell in reach and bed in lowest position

## 2022-05-12 NOTE — Clinical Note
Diagnosis: Acute congestive heart failure, unspecified heart failure type [[id number]]   Admitting Provider:: RITESH SPRINGER [[id number]]   Future Attending Provider: RITESH SPRINGER [[id number]]   Reason for IP Medical Treatment  (Clinical interventions that can only be accomplished in the IP setting? ) :: Further evaluation and treatment for acute on chronic congestive heart failure   Estimated Length of Stay:: 2 midnights   I certify that Inpatient services for greater than or equal to 2 midnights are medically necessary:: Yes   Plans for Post-Acute care--if anticipated (pick the single best option):: A. No post acute care anticipated at this time   Special Needs:: No Special Needs [1]

## 2022-05-12 NOTE — PLAN OF CARE
Problem: Diabetes Comorbidity  Goal: Blood Glucose Level Within Targeted Range  Intervention: Monitor and Manage Glycemia  Flowsheets (Taken 5/12/2022 1709)  Glycemic Management: blood glucose monitored     Problem: Renal Function Impairment (Acute Kidney Injury/Impairment)  Goal: Effective Renal Function  Intervention: Monitor and Support Renal Function  Flowsheets (Taken 5/12/2022 1709)  Medication Review/Management: medications reviewed

## 2022-05-12 NOTE — ED TRIAGE NOTES
Pt arrives in the ER by MARCIN with complaints of shortness of breath x 3 days, pt initially ws 88% on RA and improved to 100% on CPAP, pt has history of CHF and COPD. Pt denies smoking and reports dry nonproductive cough.

## 2022-05-12 NOTE — SUBJECTIVE & OBJECTIVE
Past Medical History:   Diagnosis Date    Acute congestive heart failure 3/20/2020    Alcohol abuse     Arthritis     Asthma attack 11/24/2021    Coronary artery disease     Diabetes mellitus     Hyperlipemia     Hypertension     Rhabdomyolysis        Past Surgical History:   Procedure Laterality Date    EYE SURGERY      TREATMENT OF CARDIAC ARRHYTHMIA N/A 12/7/2020    Procedure: Cardioversion or Defibrillation;  Surgeon: Indra Foote MD;  Location: [facility] CATH LAB;  Service: Cardiology;  Laterality: N/A;    TREATMENT OF CARDIAC ARRHYTHMIA N/A 12/30/2020    Procedure: Cardioversion or Defibrillation;  Surgeon: Tyrone Steen MD;  Location: [facility] CATH LAB;  Service: Cardiology;  Laterality: N/A;    VASCULAR SURGERY         Review of patient's allergies indicates:  No Known Allergies    No current facility-administered medications on file prior to encounter.     Current Outpatient Medications on File Prior to Encounter   Medication Sig    albuterol (PROVENTIL/VENTOLIN HFA) 90 mcg/actuation inhaler Inhale 2 puffs into the lungs every 4 (four) hours as needed.    amiodarone (PACERONE) 200 MG Tab Take 1 tablet (200 mg total) by mouth once daily.    aspirin 81 MG Chew Take 1 tablet (81 mg total) by mouth once daily.    DUREZOL 0.05 % Drop ophthalmic solution INT 1 GTT INTO OD FID FOR 3 WEEKS    famotidine (PEPCID) 40 MG tablet Take 40 mg by mouth once daily.    furosemide (LASIX) 80 MG tablet Take 1 tablet (80 mg total) by mouth 2 (two) times daily.    hydrALAZINE (APRESOLINE) 25 MG tablet Take 2 tablets (50 mg total) by mouth every 8 (eight) hours.    insulin aspart U-100 (NOVOLOG) 100 unit/mL (3 mL) InPn pen Inject 5 Units into the skin 3 (three) times daily.    magnesium oxide (MAG-OX) 400 mg (241.3 mg magnesium) tablet Take 800 mg by mouth.    metOLazone (ZAROXOLYN) 5 MG tablet TAKE 1 TABLET BY MOUTH 3 TIMES A WEEK AS NEEDED FOR SEVERE LEG SWELLING    metoprolol succinate (TOPROL-XL) 50 MG 24 hr tablet Take 1  tablet (50 mg total) by mouth once daily.    multivitamin Tab Take 1 tablet by mouth once daily.    potassium chloride SA (K-DUR,KLOR-CON) 20 MEQ tablet Take 2 tablets by mouth once daily.    rosuvastatin (CRESTOR) 20 MG tablet Take 20 mg by mouth once daily.    tamsulosin (FLOMAX) 0.4 mg Cap Take 1 capsule (0.4 mg total) by mouth once daily.     Family History       Problem Relation (Age of Onset)    Diabetes Mother, Father, Sister    Hypertension Mother, Father, Sister          Tobacco Use    Smoking status: Never Smoker    Smokeless tobacco: Never Used   Substance and Sexual Activity    Alcohol use: Not Currently     Alcohol/week: 4.0 standard drinks     Types: 4 Cans of beer per week    Drug use: No    Sexual activity: Yes     Partners: Female     Review of Systems   Constitutional:  Positive for activity change and appetite change.   HENT:  Negative for congestion and dental problem.    Eyes:  Negative for discharge and itching.   Respiratory:  Positive for chest tightness and shortness of breath.    Cardiovascular:  Negative for chest pain and leg swelling.   Gastrointestinal:  Negative for abdominal distention and abdominal pain.   Endocrine: Negative for cold intolerance.   Genitourinary:  Negative for difficulty urinating and dysuria.   Musculoskeletal:  Negative for arthralgias and back pain.   Skin:  Negative for color change and pallor.   Neurological:  Negative for dizziness and facial asymmetry.   Hematological:  Negative for adenopathy. Does not bruise/bleed easily.   Psychiatric/Behavioral:  Negative for agitation and behavioral problems.    Objective:     Vital Signs (Most Recent):  Temp: 97.8 °F (36.6 °C) (05/12/22 0538)  Pulse: 89 (05/12/22 0702)  Resp: 19 (05/12/22 0702)  BP: (!) 170/96 (05/12/22 0702)  SpO2: 100 % (05/12/22 0702)   Vital Signs (24h Range):  Temp:  [97.8 °F (36.6 °C)] 97.8 °F (36.6 °C)  Pulse:  [68-89] 89  Resp:  [17-23] 19  SpO2:  [99 %-100 %] 100 %  BP: (148-186)/(90-96) 170/96         There is no height or weight on file to calculate BMI.    Physical Exam  Constitutional:       Appearance: Normal appearance.   HENT:      Head: Normocephalic and atraumatic.      Nose: Nose normal.      Mouth/Throat:      Mouth: Mucous membranes are moist.      Pharynx: No oropharyngeal exudate.   Eyes:      Extraocular Movements: Extraocular movements intact.      Pupils: Pupils are equal, round, and reactive to light.   Cardiovascular:      Rate and Rhythm: Normal rate and regular rhythm.   Pulmonary:      Effort: Pulmonary effort is normal.      Breath sounds: Rhonchi and rales present.   Abdominal:      General: Abdomen is flat. There is no distension.      Palpations: Abdomen is soft.      Tenderness: There is no abdominal tenderness.   Musculoskeletal:         General: Swelling present. Normal range of motion.   Skin:     Coloration: Skin is not jaundiced.   Neurological:      Mental Status: He is alert and oriented to person, place, and time.      Cranial Nerves: No cranial nerve deficit.   Psychiatric:         Mood and Affect: Mood normal.         Behavior: Behavior normal.         CRANIAL NERVES     CN III, IV, VI   Pupils are equal, round, and reactive to light.     Significant Labs: All pertinent labs within the past 24 hours have been reviewed.  BMP:   Recent Labs   Lab 05/12/22  0507   *   *   K 3.6   CL 99   CO2 24   BUN 35*   CREATININE 2.0*   CALCIUM 9.7     CBC:   Recent Labs   Lab 05/12/22  0507   WBC 8.41   HGB 11.6*   HCT 35.7*        CMP:   Recent Labs   Lab 05/12/22  0507   *   K 3.6   CL 99   CO2 24   *   BUN 35*   CREATININE 2.0*   CALCIUM 9.7   PROT 7.8   ALBUMIN 3.8   BILITOT 0.6   ALKPHOS 101   AST 20   ALT 17   ANIONGAP 12   EGFRNONAA 33*       Significant Imaging: I have reviewed all pertinent imaging results/findings within the past 24 hours.

## 2022-05-13 LAB
ANION GAP SERPL CALC-SCNC: 13 MMOL/L (ref 8–16)
BUN SERPL-MCNC: 45 MG/DL (ref 8–23)
CALCIUM SERPL-MCNC: 9.7 MG/DL (ref 8.7–10.5)
CHLORIDE SERPL-SCNC: 97 MMOL/L (ref 95–110)
CO2 SERPL-SCNC: 26 MMOL/L (ref 23–29)
CREAT SERPL-MCNC: 1.9 MG/DL (ref 0.5–1.4)
EST. GFR  (AFRICAN AMERICAN): 40 ML/MIN/1.73 M^2
EST. GFR  (NON AFRICAN AMERICAN): 35 ML/MIN/1.73 M^2
GLUCOSE SERPL-MCNC: 172 MG/DL (ref 70–110)
POCT GLUCOSE: 212 MG/DL (ref 70–110)
POCT GLUCOSE: 236 MG/DL (ref 70–110)
POCT GLUCOSE: 256 MG/DL (ref 70–110)
POCT GLUCOSE: 276 MG/DL (ref 70–110)
POCT GLUCOSE: 293 MG/DL (ref 70–110)
POTASSIUM SERPL-SCNC: 4 MMOL/L (ref 3.5–5.1)
SODIUM SERPL-SCNC: 136 MMOL/L (ref 136–145)

## 2022-05-13 PROCEDURE — 97165 OT EVAL LOW COMPLEX 30 MIN: CPT

## 2022-05-13 PROCEDURE — G0378 HOSPITAL OBSERVATION PER HR: HCPCS

## 2022-05-13 PROCEDURE — 96376 TX/PRO/DX INJ SAME DRUG ADON: CPT | Performed by: INTERNAL MEDICINE

## 2022-05-13 PROCEDURE — 94640 AIRWAY INHALATION TREATMENT: CPT

## 2022-05-13 PROCEDURE — 97161 PT EVAL LOW COMPLEX 20 MIN: CPT

## 2022-05-13 PROCEDURE — 94640 AIRWAY INHALATION TREATMENT: CPT | Mod: XB

## 2022-05-13 PROCEDURE — 25000242 PHARM REV CODE 250 ALT 637 W/ HCPCS: Performed by: HOSPITALIST

## 2022-05-13 PROCEDURE — 80048 BASIC METABOLIC PNL TOTAL CA: CPT | Performed by: HOSPITALIST

## 2022-05-13 PROCEDURE — 96372 THER/PROPH/DIAG INJ SC/IM: CPT | Performed by: HOSPITALIST

## 2022-05-13 PROCEDURE — 63600175 PHARM REV CODE 636 W HCPCS: Performed by: HOSPITALIST

## 2022-05-13 PROCEDURE — 36415 COLL VENOUS BLD VENIPUNCTURE: CPT | Performed by: HOSPITALIST

## 2022-05-13 PROCEDURE — 25000003 PHARM REV CODE 250: Performed by: HOSPITALIST

## 2022-05-13 PROCEDURE — 99900035 HC TECH TIME PER 15 MIN (STAT)

## 2022-05-13 RX ORDER — CLONIDINE HYDROCHLORIDE 0.1 MG/1
0.1 TABLET ORAL EVERY 4 HOURS PRN
Status: DISCONTINUED | OUTPATIENT
Start: 2022-05-13 | End: 2022-05-14 | Stop reason: HOSPADM

## 2022-05-13 RX ORDER — HYDRALAZINE HYDROCHLORIDE 25 MG/1
100 TABLET, FILM COATED ORAL EVERY 8 HOURS
Status: DISCONTINUED | OUTPATIENT
Start: 2022-05-13 | End: 2022-05-14 | Stop reason: HOSPADM

## 2022-05-13 RX ORDER — HYDRALAZINE HYDROCHLORIDE 25 MG/1
50 TABLET, FILM COATED ORAL EVERY 8 HOURS
Status: DISCONTINUED | OUTPATIENT
Start: 2022-05-13 | End: 2022-05-13

## 2022-05-13 RX ADMIN — TAMSULOSIN HYDROCHLORIDE 0.4 MG: 0.4 CAPSULE ORAL at 08:05

## 2022-05-13 RX ADMIN — INSULIN ASPART 3 UNITS: 100 INJECTION, SOLUTION INTRAVENOUS; SUBCUTANEOUS at 08:05

## 2022-05-13 RX ADMIN — HYDRALAZINE HYDROCHLORIDE 100 MG: 25 TABLET, FILM COATED ORAL at 02:05

## 2022-05-13 RX ADMIN — FUROSEMIDE 80 MG: 10 INJECTION, SOLUTION INTRAMUSCULAR; INTRAVENOUS at 09:05

## 2022-05-13 RX ADMIN — FUROSEMIDE 80 MG: 10 INJECTION, SOLUTION INTRAMUSCULAR; INTRAVENOUS at 08:05

## 2022-05-13 RX ADMIN — FAMOTIDINE 20 MG: 20 TABLET ORAL at 08:05

## 2022-05-13 RX ADMIN — HYDRALAZINE HYDROCHLORIDE 25 MG: 25 TABLET, FILM COATED ORAL at 05:05

## 2022-05-13 RX ADMIN — ATORVASTATIN CALCIUM 40 MG: 40 TABLET, FILM COATED ORAL at 08:05

## 2022-05-13 RX ADMIN — IPRATROPIUM BROMIDE AND ALBUTEROL SULFATE 3 ML: 2.5; .5 SOLUTION RESPIRATORY (INHALATION) at 02:05

## 2022-05-13 RX ADMIN — INSULIN ASPART 2 UNITS: 100 INJECTION, SOLUTION INTRAVENOUS; SUBCUTANEOUS at 06:05

## 2022-05-13 RX ADMIN — ASPIRIN 81 MG: 81 TABLET, DELAYED RELEASE ORAL at 08:05

## 2022-05-13 RX ADMIN — HYDRALAZINE HYDROCHLORIDE 100 MG: 25 TABLET, FILM COATED ORAL at 09:05

## 2022-05-13 RX ADMIN — CLONIDINE HYDROCHLORIDE 0.1 MG: 0.1 TABLET ORAL at 08:05

## 2022-05-13 RX ADMIN — IPRATROPIUM BROMIDE AND ALBUTEROL SULFATE 3 ML: 2.5; .5 SOLUTION RESPIRATORY (INHALATION) at 08:05

## 2022-05-13 NOTE — SUBJECTIVE & OBJECTIVE
Past Medical History:   Diagnosis Date    Acute congestive heart failure 3/20/2020    Alcohol abuse     Arthritis     Asthma attack 11/24/2021    Coronary artery disease     Diabetes mellitus     Hyperlipemia     Hypertension     Rhabdomyolysis        Past Surgical History:   Procedure Laterality Date    EYE SURGERY      TREATMENT OF CARDIAC ARRHYTHMIA N/A 12/7/2020    Procedure: Cardioversion or Defibrillation;  Surgeon: Indra Foote MD;  Location: [facility] CATH LAB;  Service: Cardiology;  Laterality: N/A;    TREATMENT OF CARDIAC ARRHYTHMIA N/A 12/30/2020    Procedure: Cardioversion or Defibrillation;  Surgeon: Tyrone Steen MD;  Location: [facility] CATH LAB;  Service: Cardiology;  Laterality: N/A;    VASCULAR SURGERY         Review of patient's allergies indicates:  No Known Allergies    No current facility-administered medications on file prior to encounter.     Current Outpatient Medications on File Prior to Encounter   Medication Sig    albuterol (PROVENTIL/VENTOLIN HFA) 90 mcg/actuation inhaler Inhale 2 puffs into the lungs every 4 (four) hours as needed.    amiodarone (PACERONE) 200 MG Tab Take 1 tablet (200 mg total) by mouth once daily.    aspirin 81 MG Chew Take 1 tablet (81 mg total) by mouth once daily.    DUREZOL 0.05 % Drop ophthalmic solution INT 1 GTT INTO OD FID FOR 3 WEEKS    famotidine (PEPCID) 40 MG tablet Take 40 mg by mouth once daily.    furosemide (LASIX) 80 MG tablet Take 1 tablet (80 mg total) by mouth 2 (two) times daily.    hydrALAZINE (APRESOLINE) 25 MG tablet Take 2 tablets (50 mg total) by mouth every 8 (eight) hours.    insulin aspart U-100 (NOVOLOG) 100 unit/mL (3 mL) InPn pen Inject 5 Units into the skin 3 (three) times daily.    magnesium oxide (MAG-OX) 400 mg (241.3 mg magnesium) tablet Take 800 mg by mouth.    metOLazone (ZAROXOLYN) 5 MG tablet TAKE 1 TABLET BY MOUTH 3 TIMES A WEEK AS NEEDED FOR SEVERE LEG SWELLING    metoprolol succinate (TOPROL-XL) 50 MG 24 hr tablet Take 1  tablet (50 mg total) by mouth once daily.    multivitamin Tab Take 1 tablet by mouth once daily.    potassium chloride SA (K-DUR,KLOR-CON) 20 MEQ tablet Take 2 tablets by mouth once daily.    rosuvastatin (CRESTOR) 20 MG tablet Take 20 mg by mouth once daily.    tamsulosin (FLOMAX) 0.4 mg Cap Take 1 capsule (0.4 mg total) by mouth once daily.     Family History       Problem Relation (Age of Onset)    Diabetes Mother, Father, Sister    Hypertension Mother, Father, Sister          Tobacco Use    Smoking status: Never Smoker    Smokeless tobacco: Never Used   Substance and Sexual Activity    Alcohol use: Not Currently     Alcohol/week: 4.0 standard drinks     Types: 4 Cans of beer per week    Drug use: No    Sexual activity: Yes     Partners: Female     Review of Systems   Constitutional:  Positive for activity change and appetite change.   HENT:  Negative for congestion and dental problem.    Eyes:  Negative for discharge and itching.   Respiratory:  Positive for chest tightness and shortness of breath.    Cardiovascular:  Negative for chest pain and leg swelling.   Gastrointestinal:  Negative for abdominal distention and abdominal pain.   Endocrine: Negative for cold intolerance.   Genitourinary:  Negative for difficulty urinating and dysuria.   Musculoskeletal:  Negative for arthralgias and back pain.   Skin:  Negative for color change and pallor.   Neurological:  Negative for dizziness and facial asymmetry.   Hematological:  Negative for adenopathy. Does not bruise/bleed easily.   Psychiatric/Behavioral:  Negative for agitation and behavioral problems.    Objective:     Vital Signs (Most Recent):  Temp: 98 °F (36.7 °C) (05/13/22 0807)  Pulse: (!) 50 (05/13/22 0807)  Resp: 18 (05/13/22 0807)  BP: (!) 182/87 (05/13/22 0807)  SpO2: 96 % (05/13/22 0807)   Vital Signs (24h Range):  Temp:  [97.7 °F (36.5 °C)-98.3 °F (36.8 °C)] 98 °F (36.7 °C)  Pulse:  [46-58] 50  Resp:  [17-40] 18  SpO2:  [94 %-100 %] 96 %  BP:  (153-187)/(70-91) 182/87     Weight: 100.3 kg (221 lb 1.9 oz)  Body mass index is 30.84 kg/m².    Physical Exam  Constitutional:       Appearance: Normal appearance.   HENT:      Head: Normocephalic and atraumatic.      Nose: Nose normal.      Mouth/Throat:      Mouth: Mucous membranes are moist.      Pharynx: No oropharyngeal exudate.   Eyes:      Extraocular Movements: Extraocular movements intact.      Pupils: Pupils are equal, round, and reactive to light.   Cardiovascular:      Rate and Rhythm: Normal rate and regular rhythm.   Pulmonary:      Effort: Pulmonary effort is normal.      Breath sounds: Rhonchi and rales present.   Abdominal:      General: Abdomen is flat. There is no distension.      Palpations: Abdomen is soft.      Tenderness: There is no abdominal tenderness.   Musculoskeletal:         General: Swelling present. Normal range of motion.   Skin:     Coloration: Skin is not jaundiced.   Neurological:      Mental Status: He is alert and oriented to person, place, and time.      Cranial Nerves: No cranial nerve deficit.   Psychiatric:         Mood and Affect: Mood normal.         Behavior: Behavior normal.         CRANIAL NERVES     CN III, IV, VI   Pupils are equal, round, and reactive to light.     Significant Labs: All pertinent labs within the past 24 hours have been reviewed.  BMP:   Recent Labs   Lab 05/12/22  0836 05/13/22  0446   GLU  --  172*   NA  --  136   K  --  4.0   CL  --  97   CO2  --  26   BUN  --  45*   CREATININE  --  1.9*   CALCIUM  --  9.7   MG 1.9  --        CBC:   Recent Labs   Lab 05/12/22  0507   WBC 8.41   HGB 11.6*   HCT 35.7*          CMP:   Recent Labs   Lab 05/12/22  0507 05/13/22  0446   * 136   K 3.6 4.0   CL 99 97   CO2 24 26   * 172*   BUN 35* 45*   CREATININE 2.0* 1.9*   CALCIUM 9.7 9.7   PROT 7.8  --    ALBUMIN 3.8  --    BILITOT 0.6  --    ALKPHOS 101  --    AST 20  --    ALT 17  --    ANIONGAP 12 13   EGFRNONAA 33* 35*         Significant  Imaging: I have reviewed all pertinent imaging results/findings within the past 24 hours.

## 2022-05-13 NOTE — PT/OT/SLP EVAL
"Occupational Therapy   Evaluation and Discharge Note    Name: Chato Bowie  MRN: [id number]  Admitting Diagnosis:  Acute on chronic diastolic CHF (congestive heart failure)   Recent Surgery: * No surgery found *      Recommendations:     Discharge Recommendations: Home  Discharge Equipment Recommendations: None  Barriers to discharge: Patient reports admitting symptoms resolving well at present.     Assessment:     Chato Bowie is a 71 y.o. male with a medical diagnosis of Acute on chronic diastolic CHF (congestive heart failure). At this time, patient is functioning at their prior level of function and does not require further acute OT services.     Plan:     During this hospitalization, patient does not require further acute OT services.  Please re-consult if situation changes.    · Plan of Care Reviewed with: Patient     Subjective     Chief Complaint: "I feel much better. I've been getting all around here in my room. They want you to stay in your room."   Patient/Family Comments/goals: Return to own home as soon as medically/internally cleared by attending MD/s.     Occupational Profile:  Living Environment: Patient resides alone in 3rd floor apartment with elevator access. Home with tub and rails.   Prior to admission, patients level of function was (I) all ADLs and home management, mod I with intermittent use of quad cane and Rollator. (-) driving. Sister provides daily visits and all transportation needs.    Equipment used at home: rollator.  DME owned (not currently used): rolling walker and single point cane.    Upon discharge, patient will have assistance from self and near by Sister.       Pain/Comfort:    0/10  Patients cultural, spiritual, Zoroastrianism conflicts given the current situation:  No     Objective:     Communicated with: RN prior to session.  Patient found supine with no active linespon OT entry to room.    General Precautions: Standard   Orthopedic Precautions:  N/a  Braces:  " N/a  Respiratory Status: Room air     Occupational Performance:    Bed Mobility:    · Patient completed Rolling/Turning to Left with  independence  · Patient completed Rolling/Turning to Right with independence  · Patient completed Scooting/Bridging with independence  · Patient completed Supine to Sit with independence  · Patient completed Sit to Supine with independence    Functional Mobility/Transfers:  · Patient completed Sit <> Stand Transfer with independence  with  no assistive device   · Patient completed Toilet Transfer Step Transfer technique with modified independence with  no AD  · Functional Mobility: Mod (I) 2* inorganic setting.     Activities of Daily Living:  · Feeding:  independence    · Grooming: independence    · Upper Body Dressing: independence    · Lower Body Dressing: modified independence 2* seated recommended.       Cognitive/Visual Perceptual:Intact, A+OX4, able to follow complex direction, able to make complex needs known, congruent information integration at time of eval. Patient cooperative / active in own POC.  Behavior: (-) Depression, (-) Anxiety        Upper Body Physical Exam:  RESPIRATORY: non-labored / normal effort / rate. (-) accessory MM engagement.    UB SKIN: Observable UB skin warm and dry.   Sensation: Norm in UB extremities light touch/localization.  Posture: Norm   BUE AROM WNL  BUE MMT  4+<> 5/5  UB GM/FM coordination WNL  Good (-) dynamic standing bedside  (-) UB edema  (+)GM/FM coordination      Interventions:   UE ROM/MMT  Bed mobility training / assessment  Functional mobility assessment  Sit/standing balance assessment  Educated on importance of sitting OOB in bedside chair to promote increased strength, endurance & breathing.  Discussed role and POC - and collaborative discussion of D/C of acute level OT services at present.          [facility] 6 Click ADL:  AMPAC Total Score: 2424    Treatment & Education:  Patient engaged well in OT initial eval,collaborative POC  dev-> d/c of current inpatient OT services, Edu provided re: gen in room safety constructs. Very good verbal recitation.   Education:    Patient left sitting edge of bed with call button in reach, RN notified and PCT present    GOALS:   Multidisciplinary Problems     Occupational Therapy Goals     Not on file                History:     Past Medical History:   Diagnosis Date    Acute congestive heart failure 3/20/2020    Alcohol abuse     Arthritis     Asthma attack 11/24/2021    Coronary artery disease     Diabetes mellitus     Hyperlipemia     Hypertension     Rhabdomyolysis          Past Surgical History:   Procedure Laterality Date    EYE SURGERY      TREATMENT OF CARDIAC ARRHYTHMIA N/A 12/7/2020    Procedure: Cardioversion or Defibrillation;  Surgeon: Indra Foote MD;  Location: [facility] CATH LAB;  Service: Cardiology;  Laterality: N/A;    TREATMENT OF CARDIAC ARRHYTHMIA N/A 12/30/2020    Procedure: Cardioversion or Defibrillation;  Surgeon: Tyrone Steen MD;  Location: [facility] CATH LAB;  Service: Cardiology;  Laterality: N/A;    VASCULAR SURGERY         Time Tracking:     OT Date of Treatment: 05/13/22  OT Start Time: 0152  OT Stop Time: 0212  OT Total Time (min): 20 min    Billable Minutes:Evaluation 20 5/13/2022

## 2022-05-13 NOTE — PLAN OF CARE
Problem: Physical Therapy  Goal: Physical Therapy Goal  Outcome: Adequate for Care Transition   Initial eval completed.  Pt safe for discharge to home with home health.

## 2022-05-13 NOTE — PROGRESS NOTES
[facility] Medicine  Progress Note    Patient Name: Chato Bowie  MRN: [id number]  Patient Class: OP- Observation   Admission Date: 5/12/2022  Length of Stay: 1 days  Attending Physician: Raoul Morse MD  Primary Care Provider: [facility] [place]        Subjective:     Principal Problem:Acute on chronic diastolic CHF (congestive heart failure)        HPI:  71-year-old male with past medical history significant for diastolic congestive heart failure, alcohol abuse ,asthma, coronary artery disease, CKD 4,diabetes mellitus type 2 and hypertension presents to the emergency department complaining progressively worsening shortness of breath x3 days.  He also complains of intermittent chest pain since this morning.  The he describes chest pain as a pressure-like sensation, nonradiating and without exacerbating or alleviating factors. He denies nausea/vomiting/fever/chills.chest X ray show acute pulmonary edema,he received IV lasix,nebulizer and IV Solumedrol,was started on supplemental oxygen via Bipap his respiratory status is improved,urinated well, and he has been switched to NC O 2,his chest discomfort is improved,has normal troponin.patient is admitted to hospital medicine,started on CHF protocol.      Overview/Hospital Course:  71-year-old male with past medical history significant for diastolic congestive heart failure, alcohol abuse ,asthma, coronary artery disease, CKD 4,diabetes mellitus type 2 and hypertension presents to the emergency department complaining progressively worsening shortness of breath x3 days.  He also complains of intermittent chest pain since this morning.  The he describes chest pain as a pressure-like sensation, nonradiating and without exacerbating or alleviating factors. He denies nausea/vomiting/fever/chills.chest X ray show acute pulmonary edema,he received IV lasix,nebulizer and IV Solumedrol,was started on supplemental oxygen via Bipap his respiratory  status is improved,urinated well, and he has been switched to NC O 2,his chest discomfort is improved,has normal troponin.patient is admitted to hospital medicine,started on CHF protocol.respiratory status with leg edema is improving.  CC is SOB.      Past Medical History:   Diagnosis Date    Acute congestive heart failure 3/20/2020    Alcohol abuse     Arthritis     Asthma attack 11/24/2021    Coronary artery disease     Diabetes mellitus     Hyperlipemia     Hypertension     Rhabdomyolysis        Past Surgical History:   Procedure Laterality Date    EYE SURGERY      TREATMENT OF CARDIAC ARRHYTHMIA N/A 12/7/2020    Procedure: Cardioversion or Defibrillation;  Surgeon: Indra Foote MD;  Location: [facility] CATH LAB;  Service: Cardiology;  Laterality: N/A;    TREATMENT OF CARDIAC ARRHYTHMIA N/A 12/30/2020    Procedure: Cardioversion or Defibrillation;  Surgeon: Tyrone Steen MD;  Location: [facility] CATH LAB;  Service: Cardiology;  Laterality: N/A;    VASCULAR SURGERY         Review of patient's allergies indicates:  No Known Allergies    No current facility-administered medications on file prior to encounter.     Current Outpatient Medications on File Prior to Encounter   Medication Sig    albuterol (PROVENTIL/VENTOLIN HFA) 90 mcg/actuation inhaler Inhale 2 puffs into the lungs every 4 (four) hours as needed.    amiodarone (PACERONE) 200 MG Tab Take 1 tablet (200 mg total) by mouth once daily.    aspirin 81 MG Chew Take 1 tablet (81 mg total) by mouth once daily.    DUREZOL 0.05 % Drop ophthalmic solution INT 1 GTT INTO OD FID FOR 3 WEEKS    famotidine (PEPCID) 40 MG tablet Take 40 mg by mouth once daily.    furosemide (LASIX) 80 MG tablet Take 1 tablet (80 mg total) by mouth 2 (two) times daily.    hydrALAZINE (APRESOLINE) 25 MG tablet Take 2 tablets (50 mg total) by mouth every 8 (eight) hours.    insulin aspart U-100 (NOVOLOG) 100 unit/mL (3 mL) InPn pen Inject 5 Units into the skin 3 (three)  times daily.    magnesium oxide (MAG-OX) 400 mg (241.3 mg magnesium) tablet Take 800 mg by mouth.    metOLazone (ZAROXOLYN) 5 MG tablet TAKE 1 TABLET BY MOUTH 3 TIMES A WEEK AS NEEDED FOR SEVERE LEG SWELLING    metoprolol succinate (TOPROL-XL) 50 MG 24 hr tablet Take 1 tablet (50 mg total) by mouth once daily.    multivitamin Tab Take 1 tablet by mouth once daily.    potassium chloride SA (K-DUR,KLOR-CON) 20 MEQ tablet Take 2 tablets by mouth once daily.    rosuvastatin (CRESTOR) 20 MG tablet Take 20 mg by mouth once daily.    tamsulosin (FLOMAX) 0.4 mg Cap Take 1 capsule (0.4 mg total) by mouth once daily.     Family History       Problem Relation (Age of Onset)    Diabetes Mother, Father, Sister    Hypertension Mother, Father, Sister          Tobacco Use    Smoking status: Never Smoker    Smokeless tobacco: Never Used   Substance and Sexual Activity    Alcohol use: Not Currently     Alcohol/week: 4.0 standard drinks     Types: 4 Cans of beer per week    Drug use: No    Sexual activity: Yes     Partners: Female     Review of Systems   Constitutional:  Positive for activity change and appetite change.   HENT:  Negative for congestion and dental problem.    Eyes:  Negative for discharge and itching.   Respiratory:  Positive for chest tightness and shortness of breath.    Cardiovascular:  Negative for chest pain and leg swelling.   Gastrointestinal:  Negative for abdominal distention and abdominal pain.   Endocrine: Negative for cold intolerance.   Genitourinary:  Negative for difficulty urinating and dysuria.   Musculoskeletal:  Negative for arthralgias and back pain.   Skin:  Negative for color change and pallor.   Neurological:  Negative for dizziness and facial asymmetry.   Hematological:  Negative for adenopathy. Does not bruise/bleed easily.   Psychiatric/Behavioral:  Negative for agitation and behavioral problems.    Objective:     Vital Signs (Most Recent):  Temp: 98 °F (36.7 °C) (05/13/22  0807)  Pulse: (!) 50 (05/13/22 0807)  Resp: 18 (05/13/22 0807)  BP: (!) 182/87 (05/13/22 0807)  SpO2: 96 % (05/13/22 0807)   Vital Signs (24h Range):  Temp:  [97.7 °F (36.5 °C)-98.3 °F (36.8 °C)] 98 °F (36.7 °C)  Pulse:  [46-58] 50  Resp:  [17-40] 18  SpO2:  [94 %-100 %] 96 %  BP: (153-187)/(70-91) 182/87     Weight: 100.3 kg (221 lb 1.9 oz)  Body mass index is 30.84 kg/m².    Physical Exam  Constitutional:       Appearance: Normal appearance.   HENT:      Head: Normocephalic and atraumatic.      Nose: Nose normal.      Mouth/Throat:      Mouth: Mucous membranes are moist.      Pharynx: No oropharyngeal exudate.   Eyes:      Extraocular Movements: Extraocular movements intact.      Pupils: Pupils are equal, round, and reactive to light.   Cardiovascular:      Rate and Rhythm: Normal rate and regular rhythm.   Pulmonary:      Effort: Pulmonary effort is normal.      Breath sounds: Rhonchi and rales present.   Abdominal:      General: Abdomen is flat. There is no distension.      Palpations: Abdomen is soft.      Tenderness: There is no abdominal tenderness.   Musculoskeletal:         General: Swelling present. Normal range of motion.   Skin:     Coloration: Skin is not jaundiced.   Neurological:      Mental Status: He is alert and oriented to person, place, and time.      Cranial Nerves: No cranial nerve deficit.   Psychiatric:         Mood and Affect: Mood normal.         Behavior: Behavior normal.         CRANIAL NERVES     CN III, IV, VI   Pupils are equal, round, and reactive to light.     Significant Labs: All pertinent labs within the past 24 hours have been reviewed.  BMP:   Recent Labs   Lab 05/12/22  0836 05/13/22  0446   GLU  --  172*   NA  --  136   K  --  4.0   CL  --  97   CO2  --  26   BUN  --  45*   CREATININE  --  1.9*   CALCIUM  --  9.7   MG 1.9  --        CBC:   Recent Labs   Lab 05/12/22  0507   WBC 8.41   HGB 11.6*   HCT 35.7*          CMP:   Recent Labs   Lab 05/12/22  0507 05/13/22  0446    * 136   K 3.6 4.0   CL 99 97   CO2 24 26   * 172*   BUN 35* 45*   CREATININE 2.0* 1.9*   CALCIUM 9.7 9.7   PROT 7.8  --    ALBUMIN 3.8  --    BILITOT 0.6  --    ALKPHOS 101  --    AST 20  --    ALT 17  --    ANIONGAP 12 13   EGFRNONAA 33* 35*         Significant Imaging: I have reviewed all pertinent imaging results/findings within the past 24 hours.    CC is SOB.  Assessment/Plan:      * Acute on chronic diastolic CHF (congestive heart failure)  chest X ray show acute pulmonary edema,he received IV lasix,nebulizer and IV Solumedrol,was started on supplemental oxygen via Bipap his respiratory status is improved,urinated well, and he has been switched to NC O 2,his chest discomfort is improved,has normal troponin.patient is admitted to hospital medicine,started on CHF protocol.respiratory status with leg edema is improving.      Class 1 obesity in adult  Body mass index is 30.4 kg/m². Morbid obesity complicates all aspects of disease management from diagnostic modalities to treatment. Weight loss encouraged and health benefits explained to patient.         DM (diabetes mellitus) type II controlled with renal manifestation  On SSI.      Acute pulmonary edema  Duo to CHF,on IV lasix.      Acute respiratory failure with hypoxia  Patient with Hypoxic Respiratory failure which is Acute.  he is not on home oxygen. Supplemental oxygen was provided and noted- Oxygen Concentration (%):  [30] 30.   Signs/symptoms of respiratory failure include- tachypnea, increased work of breathing and respiratory distress. Contributing diagnoses includes - CHF Labs and images were reviewed. Patient Has not had a recent ABG. Will treat underlying causes and adjust management of respiratory failure as follows-   Off bipap  and is on NC O 2 at this time,.improving with IV lasix.    Anasarca  Duo to CHF ,on IV lasix,      CKD (chronic kidney disease), stage IV  Will monitor.      Paroxysmal atrial flutter  Will monitor  on  Tele,sinus at this time,not on OAC.      Aortic stenosis, moderate  Per echo,on medical treatment.      BPH (benign prostatic hyperplasia)  On Flomax.      Alcohol abuse  Has been consulted.      Mild protein malnutrition  Nutrition consulted. Most recent weight and BMI monitored-                         Measurements:  Wt Readings from Last 1 Encounters:   05/12/22 98.9 kg (218 lb)   Body mass index is 30.4 kg/m².    Recommendations:      Patient has been screened and assessed by RD. RD will follow patient.      Anemia of chronic disease  Will monitor.      Hyperlipidemia  On statin.      Essential hypertension  Resumed home  Medications.      CAD (coronary artery disease)  Will continue with medical treatment.        VTE Risk Mitigation (From admission, onward)         Ordered     IP VTE HIGH RISK PATIENT  Once         05/12/22 0807     Place sequential compression device  Until discontinued         05/12/22 0807                Discharge Planning   ELY:      Code Status: Full Code   Is the patient medically ready for discharge?:     Reason for patient still in hospital (select all that apply): Patient trending condition  Discharge Plan A: Home Health                  Raoul Morse MD  Department of Hospital Medicine   [facility] [place] - [facility]

## 2022-05-13 NOTE — HOSPITAL COURSE
71-year-old male with past medical history significant for diastolic congestive heart failure, alcohol abuse ,asthma, coronary artery disease, CKD 4,diabetes mellitus type 2 and hypertension presents to the emergency department complaining progressively worsening shortness of breath x3 days.  He also complains of intermittent chest pain since this morning.  he describes chest pain as a pressure-like sensation, nonradiating and without exacerbating or alleviating factors. He denies nausea/vomiting/fever/chills.chest X ray show acute pulmonary edema,he received IV lasix,nebulizer and IV Solumedrol,was started on supplemental oxygen via Bipap his respiratory status is improved,urinated well, and he has been switched to NC O 2,his chest discomfort is resolved,,had  normal troponin.was on CHF protocol.respiratory status much improved, leg edema is resolved,patient was stable on RA at DC time,patient was discharged home with follow up plan with PCP.

## 2022-05-13 NOTE — NURSING
Patient awake, alert, oriented resting comfortably. No signs of distress observed. bed low and locked. Call light in reach. Report given to oncoming nurse, JOYCE Mora. 12hour chart check complete. Will continue plan of care.

## 2022-05-13 NOTE — PT/OT/SLP EVAL
Physical Therapy Evaluation and Discharge Note    Patient Name:  Chato Bowie   MRN:  [id number]    Recommendations:     Discharge Recommendations:  home health PT   Discharge Equipment Recommendations: none   Barriers to discharge: None    Assessment:     Chato Bowie is a 71 y.o. male admitted with a medical diagnosis of Acute on chronic diastolic CHF (congestive heart failure). .  At this time, patient is functioning at their prior level of function; safe for discharge to home with home health.     Recent Surgery: * No surgery found *      Plan:     During this hospitalization, patient does not require further acute PT services.  Please re-consult if situation changes.      Subjective     Chief Complaint: None  Patient/Family Comments/goals: Pt reports feeling better today.  Pain/Comfort:  · Pain Rating 1: 0/10    Patients cultural, spiritual, Taoist conflicts given the current situation: no    Living Environment:  PTA pt lived alone in an apartment with elevator access.  Prior to admission, patients level of function was mod I.  Equipment used at home: rollator.  DME owned (not currently used): rolling walker and single point cane.  Upon discharge, patient will have assistance from self.    Objective:     Patient found sitting edge of bed upon PT entry to room.    General Precautions: Standard,     Orthopedic Precautions:N/A   Braces: N/A   Respiratory Status: Room air    Exams:  · Sensation:    · -       Intact  · RLE ROM: WFL  · RLE Strength: WFL  · LLE ROM: WFL  · LLE Strength: WFL    Functional Mobility:  · Bed Mobility:     · Supine to Sit: independence  · Sit to Supine: independence  · Transfers:     · Sit to Stand:  independence with rolling walker  · Gait: 250' w/RW SBA    AM-PAC 6 CLICK MOBILITY  Total Score:23       Therapeutic Activities and Exercises:   Pt educated on role of PT and POC.  Performed 1x10 reps BLEs seated EOB including APs, LAQ, marching and adduction squeezes.    AM-PAC 6 CLICK  MOBILITY  Total Score:23     Patient left sitting edge of bed with call button in reach.    GOALS:   Multidisciplinary Problems     Physical Therapy Goals        Problem: Physical Therapy    Goal Priority Disciplines Outcome Goal Variances Interventions   Physical Therapy Goal     PT, PT/OT Adequate for Care Transition                     History:     Past Medical History:   Diagnosis Date    Acute congestive heart failure 3/20/2020    Alcohol abuse     Arthritis     Asthma attack 11/24/2021    Coronary artery disease     Diabetes mellitus     Hyperlipemia     Hypertension     Rhabdomyolysis        Past Surgical History:   Procedure Laterality Date    EYE SURGERY      TREATMENT OF CARDIAC ARRHYTHMIA N/A 12/7/2020    Procedure: Cardioversion or Defibrillation;  Surgeon: Indra Foote MD;  Location: [facility] CATH LAB;  Service: Cardiology;  Laterality: N/A;    TREATMENT OF CARDIAC ARRHYTHMIA N/A 12/30/2020    Procedure: Cardioversion or Defibrillation;  Surgeon: Tyrone Steen MD;  Location: [facility] CATH LAB;  Service: Cardiology;  Laterality: N/A;    VASCULAR SURGERY         Time Tracking:     PT Received On: 05/13/22  PT Start Time: 1220     PT Stop Time: 1233  PT Total Time (min): 13 min     Billable Minutes: Evaluation 13      05/13/2022

## 2022-05-13 NOTE — NURSING
Report received from off going nurse, JOYCE Mora. Patient AAO. No signs of distress noted. Call light in reach. Bed low and locked. Will continue plan of care.

## 2022-05-13 NOTE — PLAN OF CARE
Problem: Adult Inpatient Plan of Care  Goal: Plan of Care Review  Outcome: Ongoing, Progressing  Goal: Patient-Specific Goal (Individualized)  Outcome: Ongoing, Progressing  Goal: Absence of Hospital-Acquired Illness or Injury  Outcome: Ongoing, Progressing  Goal: Optimal Comfort and Wellbeing  Outcome: Ongoing, Progressing  Goal: Readiness for Transition of Care  Outcome: Ongoing, Progressing     Problem: Diabetes Comorbidity  Goal: Blood Glucose Level Within Targeted Range  Outcome: Ongoing, Progressing     Problem: Adjustment to Illness (Sepsis/Septic Shock)  Goal: Optimal Coping  Outcome: Ongoing, Progressing     Problem: Bleeding (Sepsis/Septic Shock)  Goal: Absence of Bleeding  Outcome: Ongoing, Progressing     Problem: Glycemic Control Impaired (Sepsis/Septic Shock)  Goal: Blood Glucose Level Within Desired Range  Outcome: Ongoing, Progressing     Problem: Infection Progression (Sepsis/Septic Shock)  Goal: Absence of Infection Signs and Symptoms  Outcome: Ongoing, Progressing     Problem: Nutrition Impaired (Sepsis/Septic Shock)  Goal: Optimal Nutrition Intake  Outcome: Ongoing, Progressing     Problem: Fluid and Electrolyte Imbalance (Acute Kidney Injury/Impairment)  Goal: Fluid and Electrolyte Balance  Outcome: Ongoing, Progressing     Problem: Oral Intake Inadequate (Acute Kidney Injury/Impairment)  Goal: Optimal Nutrition Intake  Outcome: Ongoing, Progressing     Problem: Renal Function Impairment (Acute Kidney Injury/Impairment)  Goal: Effective Renal Function  Outcome: Ongoing, Progressing     Problem: Fluid Imbalance (Pneumonia)  Goal: Fluid Balance  Outcome: Ongoing, Progressing     Problem: Infection (Pneumonia)  Goal: Resolution of Infection Signs and Symptoms  Outcome: Ongoing, Progressing     Problem: Respiratory Compromise (Pneumonia)  Goal: Effective Oxygenation and Ventilation  Outcome: Ongoing, Progressing     Problem: Skin Injury Risk Increased  Goal: Skin Health and Integrity  Outcome:  Ongoing, Progressing

## 2022-05-13 NOTE — ASSESSMENT & PLAN NOTE
chest X ray show acute pulmonary edema,he received IV lasix,nebulizer and IV Solumedrol,was started on supplemental oxygen via Bipap his respiratory status is improved,urinated well, and he has been switched to NC O 2,his chest discomfort is improved,has normal troponin.patient is admitted to hospital medicine,started on CHF protocol.respiratory status with leg edema is improving.

## 2022-05-13 NOTE — PLAN OF CARE
Problem: Diabetes Comorbidity  Goal: Blood Glucose Level Within Targeted Range  Intervention: Monitor and Manage Glycemia  Flowsheets (Taken 5/13/2022 1809)  Glycemic Management: blood glucose monitored     Problem: Glycemic Control Impaired (Sepsis/Septic Shock)  Goal: Blood Glucose Level Within Desired Range  Intervention: Optimize Glycemic Control  Flowsheets (Taken 5/13/2022 1809)  Glycemic Management: blood glucose monitored     Problem: Renal Function Impairment (Acute Kidney Injury/Impairment)  Goal: Effective Renal Function  Intervention: Monitor and Support Renal Function  Flowsheets (Taken 5/13/2022 1809)  Medication Review/Management: medications reviewed

## 2022-05-13 NOTE — NURSING
Patient awake, alert, oriented resting comfortably. No signs of distress observed. o2 therapy in place at 2L per NC.  bed low and locked. Call light in reach. Report given to oncoming nurse, JOYCE Mora. 12hour chart check complete. Will continue plan of care.

## 2022-05-13 NOTE — ASSESSMENT & PLAN NOTE
Patient with Hypoxic Respiratory failure which is Acute.  he is not on home oxygen. Supplemental oxygen was provided and noted- Oxygen Concentration (%):  [30] 30.   Signs/symptoms of respiratory failure include- tachypnea, increased work of breathing and respiratory distress. Contributing diagnoses includes - CHF Labs and images were reviewed. Patient Has not had a recent ABG. Will treat underlying causes and adjust management of respiratory failure as follows-   Off bipap  and is on NC O 2 at this time,.improving with IV lasix.

## 2022-05-13 NOTE — NURSING
RAPID RESPONSE NURSE PROACTIVE ROUNDING NOTE       Time of Visit:   Admit Date: 2022  LOS: 1  Code Status: Full Code   Date of Visit: 2022  : 1950  Age: 71 y.o.  Sex: male  Race: Black or   [facility]   MRN: [id number]  Was the patient discharged from an ICU this admission? No   Was the patient discharged from a PACU within last 24 hours? No   Did the patient receive conscious sedation/general anesthesia in last 24 hours? No   Was the patient in the ED within the past 24 hours? Yes   Was the patient on NIPPV within the past 24 hours? Yes   Attending Physician: Raoul Morse MD  Primary Service: Hospitalist   Time spent at the bedside: < 15 min    SITUATION    Notified by Epic patient alert  Reason for alert: MEWS 3, HR 50, RR 23, /70     Diagnosis: Acute on chronic diastolic CHF (congestive heart failure)   has a past medical history of Acute congestive heart failure, Alcohol abuse, Arthritis, Asthma attack, Coronary artery disease, Diabetes mellitus, Hyperlipemia, Hypertension, and Rhabdomyolysis.    Last Vitals:  Temp: 97.9 °F (36.6 °C) (2318)  Pulse: 58 (2318)  Resp: 18 (2318)  BP: 153/83 (2318)  SpO2: 94 % (2318)    24 Hour Vitals Range:  Temp:  [97.7 °F (36.5 °C)-98 °F (36.7 °C)]   Pulse:  [46-89]   Resp:  [17-40]   BP: (148-192)/()   SpO2:  [94 %-100 %]     Clinical Issues: Circulatory    ASSESSMENT/INTERVENTIONS  Pt asleep on qHS bipap. RR 15, breathing even and unlabored. HR in 50s during this hospital stay. MDs aware per primary RN. PRN hydralazine given by primary RN for SBP 160s.     RECOMMENDATIONS  Continue to monitor.     Discussed plan of care with bedside RNAbby    PROVIDER ESCALATION    Physician escalation: No    Orders received and case discussed with NA.    Disposition:Remain in room 331    FOLLOW UP    Call back the Rapid Response NurseConnie at [phone] for additional questions or  concerns.

## 2022-05-14 VITALS
RESPIRATION RATE: 20 BRPM | BODY MASS INDEX: 30.96 KG/M2 | HEART RATE: 108 BPM | SYSTOLIC BLOOD PRESSURE: 123 MMHG | HEIGHT: 71 IN | OXYGEN SATURATION: 96 % | TEMPERATURE: 98 F | DIASTOLIC BLOOD PRESSURE: 79 MMHG | WEIGHT: 221.13 LBS

## 2022-05-14 LAB
ANION GAP SERPL CALC-SCNC: 13 MMOL/L (ref 8–16)
BUN SERPL-MCNC: 51 MG/DL (ref 8–23)
CALCIUM SERPL-MCNC: 9.7 MG/DL (ref 8.7–10.5)
CHLORIDE SERPL-SCNC: 96 MMOL/L (ref 95–110)
CO2 SERPL-SCNC: 31 MMOL/L (ref 23–29)
CREAT SERPL-MCNC: 1.9 MG/DL (ref 0.5–1.4)
EST. GFR  (AFRICAN AMERICAN): 40 ML/MIN/1.73 M^2
EST. GFR  (NON AFRICAN AMERICAN): 35 ML/MIN/1.73 M^2
GLUCOSE SERPL-MCNC: 153 MG/DL (ref 70–110)
POCT GLUCOSE: 142 MG/DL (ref 70–110)
POCT GLUCOSE: 285 MG/DL (ref 70–110)
POTASSIUM SERPL-SCNC: 3 MMOL/L (ref 3.5–5.1)
SODIUM SERPL-SCNC: 140 MMOL/L (ref 136–145)

## 2022-05-14 PROCEDURE — 25000242 PHARM REV CODE 250 ALT 637 W/ HCPCS: Performed by: HOSPITALIST

## 2022-05-14 PROCEDURE — G0378 HOSPITAL OBSERVATION PER HR: HCPCS

## 2022-05-14 PROCEDURE — 36415 COLL VENOUS BLD VENIPUNCTURE: CPT | Performed by: HOSPITALIST

## 2022-05-14 PROCEDURE — 80048 BASIC METABOLIC PNL TOTAL CA: CPT | Performed by: HOSPITALIST

## 2022-05-14 PROCEDURE — 94660 CPAP INITIATION&MGMT: CPT

## 2022-05-14 PROCEDURE — 94640 AIRWAY INHALATION TREATMENT: CPT

## 2022-05-14 PROCEDURE — 25000003 PHARM REV CODE 250: Performed by: NURSE PRACTITIONER

## 2022-05-14 PROCEDURE — 94761 N-INVAS EAR/PLS OXIMETRY MLT: CPT

## 2022-05-14 PROCEDURE — 96374 THER/PROPH/DIAG INJ IV PUSH: CPT | Mod: 59 | Performed by: INTERNAL MEDICINE

## 2022-05-14 PROCEDURE — 99900035 HC TECH TIME PER 15 MIN (STAT)

## 2022-05-14 PROCEDURE — 63600175 PHARM REV CODE 636 W HCPCS: Performed by: HOSPITALIST

## 2022-05-14 PROCEDURE — 25000003 PHARM REV CODE 250: Performed by: HOSPITALIST

## 2022-05-14 RX ORDER — POTASSIUM CHLORIDE 20 MEQ/1
60 TABLET, EXTENDED RELEASE ORAL ONCE
Status: COMPLETED | OUTPATIENT
Start: 2022-05-14 | End: 2022-05-14

## 2022-05-14 RX ORDER — GUAIFENESIN 100 MG/5ML
200 SOLUTION ORAL EVERY 4 HOURS PRN
Status: DISCONTINUED | OUTPATIENT
Start: 2022-05-14 | End: 2022-05-14 | Stop reason: HOSPADM

## 2022-05-14 RX ADMIN — METOPROLOL SUCCINATE 50 MG: 50 TABLET, EXTENDED RELEASE ORAL at 09:05

## 2022-05-14 RX ADMIN — ATORVASTATIN CALCIUM 40 MG: 40 TABLET, FILM COATED ORAL at 09:05

## 2022-05-14 RX ADMIN — INSULIN ASPART 3 UNITS: 100 INJECTION, SOLUTION INTRAVENOUS; SUBCUTANEOUS at 11:05

## 2022-05-14 RX ADMIN — TAMSULOSIN HYDROCHLORIDE 0.4 MG: 0.4 CAPSULE ORAL at 09:05

## 2022-05-14 RX ADMIN — HYDRALAZINE HYDROCHLORIDE 100 MG: 25 TABLET, FILM COATED ORAL at 05:05

## 2022-05-14 RX ADMIN — GUAIFENESIN 200 MG: 200 SOLUTION ORAL at 05:05

## 2022-05-14 RX ADMIN — POTASSIUM CHLORIDE 60 MEQ: 1500 TABLET, EXTENDED RELEASE ORAL at 09:05

## 2022-05-14 RX ADMIN — GUAIFENESIN 200 MG: 200 SOLUTION ORAL at 12:05

## 2022-05-14 RX ADMIN — FUROSEMIDE 80 MG: 10 INJECTION, SOLUTION INTRAMUSCULAR; INTRAVENOUS at 09:05

## 2022-05-14 RX ADMIN — AMIODARONE HYDROCHLORIDE 200 MG: 200 TABLET ORAL at 09:05

## 2022-05-14 RX ADMIN — IPRATROPIUM BROMIDE AND ALBUTEROL SULFATE 3 ML: 2.5; .5 SOLUTION RESPIRATORY (INHALATION) at 07:05

## 2022-05-14 RX ADMIN — FAMOTIDINE 20 MG: 20 TABLET ORAL at 09:05

## 2022-05-14 RX ADMIN — ASPIRIN 81 MG: 81 TABLET, DELAYED RELEASE ORAL at 09:05

## 2022-05-14 NOTE — PLAN OF CARE
Problem: Adult Inpatient Plan of Care  Goal: Plan of Care Review  Outcome: Ongoing, Progressing  Goal: Patient-Specific Goal (Individualized)  Outcome: Ongoing, Progressing  Goal: Absence of Hospital-Acquired Illness or Injury  Outcome: Ongoing, Progressing  Goal: Optimal Comfort and Wellbeing  Outcome: Ongoing, Progressing  Goal: Readiness for Transition of Care  Outcome: Ongoing, Progressing     Problem: Diabetes Comorbidity  Goal: Blood Glucose Level Within Targeted Range  Outcome: Ongoing, Progressing     Problem: Adjustment to Illness (Sepsis/Septic Shock)  Goal: Optimal Coping  Outcome: Ongoing, Progressing     Problem: Bleeding (Sepsis/Septic Shock)  Goal: Absence of Bleeding  Outcome: Ongoing, Progressing     Problem: Glycemic Control Impaired (Sepsis/Septic Shock)  Goal: Blood Glucose Level Within Desired Range  Outcome: Ongoing, Progressing     Problem: Infection Progression (Sepsis/Septic Shock)  Goal: Absence of Infection Signs and Symptoms  Outcome: Ongoing, Progressing

## 2022-05-14 NOTE — PLAN OF CARE
[facility] - [place] - Telemetry      HOME HEALTH ORDERS  FACE TO FACE ENCOUNTER    Patient Name: Chato Bowie  YOB: 1950    PCP: Irlanda Valenzuela   PCP Address: [address]  PCP Phone Number: [phone]  PCP Fax: [phone]    Encounter Date: 5/12/22    Admit to Home Health    Diagnoses:  Active Hospital Problems    Diagnosis  POA    *Acute on chronic diastolic CHF (congestive heart failure) [I50.33]  Yes     Priority: 1 - High    Acute respiratory failure with hypoxia [J96.01]  Yes    Acute pulmonary edema [J81.0]  Yes    DM (diabetes mellitus) type II controlled with renal manifestation [E11.29]  Yes    Class 1 obesity in adult [E66.9]  Yes    Anasarca [R60.1]  Yes    CKD (chronic kidney disease), stage IV [N18.4]  Yes    Paroxysmal atrial flutter [I48.92]  Yes    BPH (benign prostatic hyperplasia) [N40.0]  Yes    Aortic stenosis, moderate [I35.0]  Yes    Alcohol abuse [F10.10]  Yes    Essential hypertension [I10]  Yes     Chronic    Anemia of chronic disease [D63.8]  Yes    Mild protein malnutrition [E44.1]  Yes     Chronic    Hyperlipidemia [E78.5]  Yes     Chronic    CAD (coronary artery disease) [I25.10]  Yes     Chronic      Resolved Hospital Problems   No resolved problems to display.       Follow Up Appointments:  No future appointments.    Allergies:Review of patient's allergies indicates:  No Known Allergies    Medications: Review discharge medications with patient and family and provide education.    Current Facility-Administered Medications   Medication Dose Route Frequency Provider Last Rate Last Admin    albuterol-ipratropium 2.5 mg-0.5 mg/3 mL nebulizer solution 3 mL  3 mL Nebulization Q6H WAKE Raoul Morse MD   3 mL at 05/14/22 0757    amiodarone tablet 200 mg  200 mg Oral Daily Raoul Morse MD   200 mg at 05/12/22 0910    aspirin EC tablet 81 mg  81 mg Oral Daily Raoul Morse MD   81 mg at 05/13/22 0822     atorvastatin tablet 40 mg  40 mg Oral Daily Raoul Morse MD   40 mg at 05/13/22 0823    cloNIDine tablet 0.1 mg  0.1 mg Oral Q4H PRN Raoul Morse MD   0.1 mg at 05/13/22 0822    dextrose 10% bolus 125 mL  12.5 g Intravenous PRN Raoul Morse MD        dextrose 10% bolus 250 mL  25 g Intravenous PRN Raoul Morse MD        famotidine tablet 20 mg  20 mg Oral Daily Raoul Morse MD   20 mg at 05/13/22 0823    furosemide injection 80 mg  80 mg Intravenous Q12H Raoul Morse MD   80 mg at 05/13/22 2125    glucagon (human recombinant) injection 1 mg  1 mg Intramuscular PRN Raoul Morse MD        glucose chewable tablet 16 g  16 g Oral PRN Raoul Morse MD        glucose chewable tablet 24 g  24 g Oral PRN Raoul Morse MD        guaiFENesin 100 mg/5 ml syrup 200 mg  200 mg Oral Q4H PRN MAGDALENA Medellin, ANP   200 mg at 05/14/22 0531    hydrALAZINE tablet 100 mg  100 mg Oral Q8H Raoul Morse MD   100 mg at 05/14/22 0527    insulin aspart U-100 pen 0-5 Units  0-5 Units Subcutaneous QID (AC + HS) PRN Raoul Morse MD   2 Units at 05/13/22 1818    metoprolol succinate (TOPROL-XL) 24 hr tablet 50 mg  50 mg Oral Daily Raoul Morse MD   50 mg at 05/12/22 0915    potassium chloride SA CR tablet 60 mEq  60 mEq Oral Once Raoul Morse MD        sodium chloride 0.9% flush 10 mL  10 mL Intravenous PRN Raoul Morse MD        tamsulosin 24 hr capsule 0.4 mg  0.4 mg Oral Daily Raoul Morse MD   0.4 mg at 05/13/22 0823     Current Discharge Medication List      CONTINUE these medications which have NOT CHANGED    Details   albuterol (PROVENTIL/VENTOLIN HFA) 90 mcg/actuation inhaler Inhale 2 puffs into the lungs every 4 (four) hours as needed.  Qty: 8.5 g, Refills: 5      amiodarone (PACERONE) 200 MG Tab Take 1 tablet (200 mg total) by mouth once daily.  Qty: 30 tablet, Refills:  1      aspirin 81 MG Chew Take 1 tablet (81 mg total) by mouth once daily.  Refills: 0      DUREZOL 0.05 % Drop ophthalmic solution INT 1 GTT INTO OD FID FOR 3 WEEKS      famotidine (PEPCID) 40 MG tablet Take 40 mg by mouth once daily.      furosemide (LASIX) 80 MG tablet Take 1 tablet (80 mg total) by mouth 2 (two) times daily.  Qty: 60 tablet, Refills: 1      hydrALAZINE (APRESOLINE) 25 MG tablet Take 2 tablets (50 mg total) by mouth every 8 (eight) hours.  Qty: 90 tablet, Refills: 1    Comments: .      insulin aspart U-100 (NOVOLOG) 100 unit/mL (3 mL) InPn pen Inject 5 Units into the skin 3 (three) times daily.  Qty: 4.5 mL, Refills: 11      magnesium oxide (MAG-OX) 400 mg (241.3 mg magnesium) tablet Take 800 mg by mouth.      metOLazone (ZAROXOLYN) 5 MG tablet TAKE 1 TABLET BY MOUTH 3 TIMES A WEEK AS NEEDED FOR SEVERE LEG SWELLING      metoprolol succinate (TOPROL-XL) 50 MG 24 hr tablet Take 1 tablet (50 mg total) by mouth once daily.  Qty: 30 tablet, Refills: 1    Comments: .      multivitamin Tab Take 1 tablet by mouth once daily.  Qty:        potassium chloride SA (K-DUR,KLOR-CON) 20 MEQ tablet Take 2 tablets by mouth once daily.      rosuvastatin (CRESTOR) 20 MG tablet Take 20 mg by mouth once daily.      tamsulosin (FLOMAX) 0.4 mg Cap Take 1 capsule (0.4 mg total) by mouth once daily.  Qty: 30 capsule, Refills: 11               I have seen and examined this patient within the last 30 days. My clinical findings that support the need for the home health skilled services and home bound status are the following:no   Weakness/numbness causing balance and gait disturbance due to Weakness/Debility making it taxing to leave home.     Diet:   diabetic diet 2000 calorie and 2 gram sodium diet        Referrals/ Consults  Physical Therapy to evaluate and treat. Evaluate for home safety and equipment needs; Establish/upgrade home exercise program. Perform / instruct on therapeutic exercises, gait training, transfer  training, and Range of Motion.  Occupational Therapy to evaluate and treat. Evaluate home environment for safety and equipment needs. Perform/Instruct on transfers, ADL training, ROM, and therapeutic exercises.    Activities:   activity as tolerated    Nursing:   Agency to admit patient within 24 hours of hospital discharge unless specified on physician order or at patient request    SN to complete comprehensive assessment including routine vital signs. Instruct on disease process and s/s of complications to report to MD. Review/verify medication list sent home with the patient at time of discharge  and instruct patient/caregiver as needed. Frequency may be adjusted depending on start of care date.     Skilled nurse to perform up to 3 visits PRN for symptoms related to diagnosis    Notify MD if SBP > 160 or < 90; DBP > 90 or < 50; HR > 120 or < 50; Temp > 101; O2 < 88%; Other:       Ok to schedule additional visits based on staff availability and patient request on consecutive days within the home health episode.    When multiple disciplines ordered:    Start of Care occurs on Sunday - Wednesday schedule remaining discipline evaluations as ordered on separate consecutive days following the start of care.    Thursday SOC -schedule subsequent evaluations Friday and Monday the following week.     Friday - Saturday SOC - schedule subsequent discipline evaluations on consecutive days starting Monday of the following week.    For all post-discharge communication and subsequent orders please contact patient's primary care physician. If unable to reach primary care physician or do not receive response within 30 minutes, please contact [facility]  for clinical staff order clarification    Miscellaneous   Routine Skin for Bedridden Patients: Instruct patient/caregiver to apply moisture barrier cream to all skin folds and wet areas in perineal area daily and after baths and all bowel movements.    Home Health Aide:  Nursing Twice  weekly, Physical Therapy Twice weekly and Occupational Therapy Twice weekly    Wound Care Orders  no    I certify that this patient is confined to his home and needs intermittent skilled nursing care, physical therapy and occupational therapy.

## 2022-05-14 NOTE — PLAN OF CARE
05/14/22 1149   Final Note   Assessment Type Final Discharge Note   Anticipated Discharge Disposition Home-Health   Hospital Resources/Appts/Education Provided Appointments scheduled and added to AVS   Post-Acute Status   Post-Acute Authorization Home Health   Home Health Status Set-up Complete/Auth obtained   Discharge Delays None known at this time   [facility] accepted patient

## 2022-05-14 NOTE — RESPIRATORY THERAPY
Patient received on room air. Aerosol treatment given as ordered. BIPAP on standby at this time. Patient would like to be placed later on BIPAP.

## 2022-05-14 NOTE — PLAN OF CARE
05/14/22 1111   Post-Acute Status   Post-Acute Authorization Home Health   Home Health Status Referrals Sent   Patient choice form signed by patient/caregiver   (Patient stated he wanted the same provider as last time ([facility]))   Discharge Plan   Discharge Plan A Home Health

## 2022-05-14 NOTE — DISCHARGE SUMMARY
[facility] Medicine  Discharge Summary      Patient Name: Chato Bowie  MRN: [id number]  Patient Class: OP- Observation  Admission Date: 5/12/2022  Hospital Length of Stay: 2 days   Discharge Date and Time:  05/14/2022 10:53 AM  Attending Physician: Raoul Morse MD   Discharging Provider: Raoul Morse MD  Primary Care Provider: [facility]      HPI:   71-year-old male with past medical history significant for diastolic congestive heart failure, alcohol abuse ,asthma, coronary artery disease, CKD 4,diabetes mellitus type 2 and hypertension presents to the emergency department complaining progressively worsening shortness of breath x3 days.  He also complains of intermittent chest pain since this morning.  The he describes chest pain as a pressure-like sensation, nonradiating and without exacerbating or alleviating factors. He denies nausea/vomiting/fever/chills.chest X ray show acute pulmonary edema,he received IV lasix,nebulizer and IV Solumedrol,was started on supplemental oxygen via Bipap his respiratory status is improved,urinated well, and he has been switched to NC O 2,his chest discomfort is improved,has normal troponin.patient is admitted to hospital medicine,started on CHF protocol.      * No surgery found *      Hospital Course:   71-year-old male with past medical history significant for diastolic congestive heart failure, alcohol abuse ,asthma, coronary artery disease, CKD 4,diabetes mellitus type 2 and hypertension presents to the emergency department complaining progressively worsening shortness of breath x3 days.  He also complains of intermittent chest pain since this morning.  he describes chest pain as a pressure-like sensation, nonradiating and without exacerbating or alleviating factors. He denies nausea/vomiting/fever/chills.chest X ray show acute pulmonary edema,he received IV lasix,nebulizer and IV Solumedrol,was started on supplemental oxygen via  Bipap his respiratory status is improved,urinated well, and he has been switched to NC O 2,his chest discomfort is resolved,,had  normal troponin.was on CHF protocol.respiratory status much improved, leg edema is resolved,patient was stable on RA at DC time,patient was discharged home with follow up plan with PCP.         Goals of Care Treatment Preferences:  Code Status: Full Code      Consults:   Consults (From admission, onward)        Status Ordering Provider     Inpatient consult to Social Work/Case Management  Once        Provider:  (Not yet assigned)    Completed RABIA SALAZAR     Inpatient consult to Registered Dietitian/Nutritionist  Once        Provider:  (Not yet assigned)    Completed RABIA SALAZAR          No new Assessment & Plan notes have been filed under this hospital service since the last note was generated.  Service: Hospital Medicine    Final Active Diagnoses:    Diagnosis Date Noted POA    PRINCIPAL PROBLEM:  Acute on chronic diastolic CHF (congestive heart failure) [I50.33] 05/12/2022 Yes    Acute respiratory failure with hypoxia [J96.01] 05/12/2022 Yes    Acute pulmonary edema [J81.0] 05/12/2022 Yes    DM (diabetes mellitus) type II controlled with renal manifestation [E11.29] 05/12/2022 Yes    Class 1 obesity in adult [E66.9] 05/12/2022 Yes    Anasarca [R60.1] 02/08/2022 Yes    CKD (chronic kidney disease), stage IV [N18.4] 03/23/2021 Yes    Paroxysmal atrial flutter [I48.92] 12/07/2020 Yes    BPH (benign prostatic hyperplasia) [N40.0] 11/24/2020 Yes    Aortic stenosis, moderate [I35.0] 11/24/2020 Yes    Alcohol abuse [F10.10] 02/14/2019 Yes    Essential hypertension [I10] 03/10/2017 Yes     Chronic    Anemia of chronic disease [D63.8] 03/10/2017 Yes    Mild protein malnutrition [E44.1] 03/10/2017 Yes     Chronic    Hyperlipidemia [E78.5] 03/10/2017 Yes     Chronic    CAD (coronary artery disease) [I25.10] 03/08/2016 Yes     Chronic      Problems Resolved  During this Admission:       Discharged Condition: stable    Disposition: Home or Self Care    Follow Up:   Follow-up Information     [facility] Follow up.    Contact information:  [address] [phone]                       Patient Instructions:      Activity as tolerated       Significant Diagnostic Studies: Labs:   BMP:   Recent Labs   Lab 05/13/22  0446 05/14/22  0525   * 153*    140   K 4.0 3.0*   CL 97 96   CO2 26 31*   BUN 45* 51*   CREATININE 1.9* 1.9*   CALCIUM 9.7 9.7   , CMP   Recent Labs   Lab 05/13/22  0446 05/14/22  0525    140   K 4.0 3.0*   CL 97 96   CO2 26 31*   * 153*   BUN 45* 51*   CREATININE 1.9* 1.9*   CALCIUM 9.7 9.7   ANIONGAP 13 13   ESTGFRAFRICA 40* 40*   EGFRNONAA 35* 35*   , CBC No results for input(s): WBC, HGB, HCT, PLT in the last 48 hours. and Troponin   Recent Labs   Lab 05/12/22  0507   TROPONINI 0.018     Radiology: X-Ray: CXR: X-Ray Chest 1 View (CXR): No results found for this visit on 05/12/22. and X-Ray Chest PA and Lateral (CXR): No results found for this visit on 05/12/22.    Pending Diagnostic Studies:     None         Medications:  Reconciled Home Medications:      Medication List      CONTINUE taking these medications    albuterol 90 mcg/actuation inhaler  Commonly known as: PROVENTIL/VENTOLIN HFA  Inhale 2 puffs into the lungs every 4 (four) hours as needed.     amiodarone 200 MG Tab  Commonly known as: PACERONE  Take 1 tablet (200 mg total) by mouth once daily.     aspirin 81 MG Chew  Take 1 tablet (81 mg total) by mouth once daily.     DurezoL 0.05 % Drop ophthalmic solution  Generic drug: difluprednate  INT 1 GTT INTO OD FID FOR 3 WEEKS     famotidine 40 MG tablet  Commonly known as: PEPCID  Take 40 mg by mouth once daily.     furosemide 80 MG tablet  Commonly known as: LASIX  Take 1 tablet (80 mg total) by mouth 2 (two) times daily.     hydrALAZINE 25 MG tablet  Commonly known as: APRESOLINE  Take 2 tablets  (50 mg total) by mouth every 8 (eight) hours.     insulin aspart U-100 100 unit/mL (3 mL) Inpn pen  Commonly known as: NovoLOG  Inject 5 Units into the skin 3 (three) times daily.     magnesium oxide 400 mg (241.3 mg magnesium) tablet  Commonly known as: MAG-OX  Take 800 mg by mouth.     metOLazone 5 MG tablet  Commonly known as: ZAROXOLYN  TAKE 1 TABLET BY MOUTH 3 TIMES A WEEK AS NEEDED FOR SEVERE LEG SWELLING     metoprolol succinate 50 MG 24 hr tablet  Commonly known as: TOPROL-XL  Take 1 tablet (50 mg total) by mouth once daily.     multivitamin Tab  Take 1 tablet by mouth once daily.     potassium chloride SA 20 MEQ tablet  Commonly known as: K-DUR,KLOR-CON  Take 2 tablets by mouth once daily.     rosuvastatin 20 MG tablet  Commonly known as: CRESTOR  Take 20 mg by mouth once daily.     tamsulosin 0.4 mg Cap  Commonly known as: FLOMAX  Take 1 capsule (0.4 mg total) by mouth once daily.            Indwelling Lines/Drains at time of discharge:   Lines/Drains/Airways     None                 Time spent on the discharge of patient: less than 30  minutes         Raoul Morse MD  Department of Hospital Medicine  [facility] - Telemetry

## 2022-05-17 PROCEDURE — G0180 MD CERTIFICATION HHA PATIENT: HCPCS | Mod: ,,, | Performed by: HOSPITALIST

## 2022-05-17 PROCEDURE — G0180 PR HOME HEALTH MD CERTIFICATION: ICD-10-PCS | Mod: ,,, | Performed by: HOSPITALIST

## 2022-05-18 LAB — POCT GLUCOSE: 159 MG/DL (ref 70–110)

## 2022-06-13 ENCOUNTER — TELEPHONE (OUTPATIENT)
Dept: UROLOGY | Facility: CLINIC | Age: 72
End: 2022-06-13
Payer: MEDICARE

## 2022-06-13 RX ORDER — SILDENAFIL 100 MG/1
TABLET, FILM COATED ORAL
Qty: 30 TABLET | Refills: 0 | Status: ON HOLD | OUTPATIENT
Start: 2022-06-13 | End: 2023-08-15 | Stop reason: HOSPADM

## 2022-06-14 ENCOUNTER — EXTERNAL HOME HEALTH (OUTPATIENT)
Dept: HOME HEALTH SERVICES | Facility: HOSPITAL | Age: 72
End: 2022-06-14
Payer: MEDICARE

## 2022-08-15 PROBLEM — J81.0 ACUTE PULMONARY EDEMA: Status: RESOLVED | Noted: 2022-05-12 | Resolved: 2022-08-15

## 2023-01-05 ENCOUNTER — HOSPITAL ENCOUNTER (OUTPATIENT)
Facility: HOSPITAL | Age: 73
Discharge: HOME | End: 2023-01-06
Attending: EMERGENCY MEDICINE | Admitting: HOSPITALIST
Payer: MEDICARE

## 2023-01-05 ENCOUNTER — ANESTHESIA (OUTPATIENT)
Dept: CARDIOLOGY | Facility: HOSPITAL | Age: 73
End: 2023-01-05
Payer: MEDICARE

## 2023-01-05 ENCOUNTER — ANESTHESIA EVENT (OUTPATIENT)
Dept: CARDIOLOGY | Facility: HOSPITAL | Age: 73
End: 2023-01-05
Payer: MEDICARE

## 2023-01-05 DIAGNOSIS — I48.92 ATRIAL FLUTTER: ICD-10-CM

## 2023-01-05 DIAGNOSIS — I50.9 ACUTE ON CHRONIC CONGESTIVE HEART FAILURE, UNSPECIFIED HEART FAILURE TYPE: ICD-10-CM

## 2023-01-05 DIAGNOSIS — I50.9 ACUTE ON CHRONIC HEART FAILURE: ICD-10-CM

## 2023-01-05 DIAGNOSIS — N18.9 ACUTE ON CHRONIC RENAL INSUFFICIENCY: ICD-10-CM

## 2023-01-05 DIAGNOSIS — R07.9 CHEST PAIN: ICD-10-CM

## 2023-01-05 DIAGNOSIS — I48.3 TYPICAL ATRIAL FLUTTER: Primary | ICD-10-CM

## 2023-01-05 DIAGNOSIS — N28.9 ACUTE ON CHRONIC RENAL INSUFFICIENCY: ICD-10-CM

## 2023-01-05 DIAGNOSIS — R00.1 SYMPTOMATIC BRADYCARDIA: ICD-10-CM

## 2023-01-05 LAB
ALBUMIN SERPL BCP-MCNC: 3.6 G/DL (ref 3.5–5.2)
ALP SERPL-CCNC: 72 U/L (ref 55–135)
ALT SERPL W/O P-5'-P-CCNC: 24 U/L (ref 10–44)
ANION GAP SERPL CALC-SCNC: 13 MMOL/L (ref 8–16)
AST SERPL-CCNC: 23 U/L (ref 10–40)
BASOPHILS # BLD AUTO: 0.08 K/UL (ref 0–0.2)
BASOPHILS NFR BLD: 1.4 % (ref 0–1.9)
BILIRUB SERPL-MCNC: 0.8 MG/DL (ref 0.1–1)
BNP SERPL-MCNC: 804 PG/ML (ref 0–99)
BSA FOR ECHO PROCEDURE: 2.24 M2
BUN SERPL-MCNC: 64 MG/DL (ref 8–23)
CALCIUM SERPL-MCNC: 9.2 MG/DL (ref 8.7–10.5)
CHLORIDE SERPL-SCNC: 98 MMOL/L (ref 95–110)
CO2 SERPL-SCNC: 24 MMOL/L (ref 23–29)
CREAT SERPL-MCNC: 2.5 MG/DL (ref 0.5–1.4)
DIFFERENTIAL METHOD: ABNORMAL
EOSINOPHIL # BLD AUTO: 0.2 K/UL (ref 0–0.5)
EOSINOPHIL NFR BLD: 2.9 % (ref 0–8)
ERYTHROCYTE [DISTWIDTH] IN BLOOD BY AUTOMATED COUNT: 14 % (ref 11.5–14.5)
EST. GFR  (NO RACE VARIABLE): 27 ML/MIN/1.73 M^2
GLUCOSE SERPL-MCNC: 154 MG/DL (ref 70–110)
HCT VFR BLD AUTO: 38.9 % (ref 40–54)
HGB BLD-MCNC: 13.5 G/DL (ref 14–18)
IMM GRANULOCYTES # BLD AUTO: 0.03 K/UL (ref 0–0.04)
IMM GRANULOCYTES NFR BLD AUTO: 0.5 % (ref 0–0.5)
LYMPHOCYTES # BLD AUTO: 0.7 K/UL (ref 1–4.8)
LYMPHOCYTES NFR BLD: 13 % (ref 18–48)
MAGNESIUM SERPL-MCNC: 1.6 MG/DL (ref 1.6–2.6)
MCH RBC QN AUTO: 27.2 PG (ref 27–31)
MCHC RBC AUTO-ENTMCNC: 34.7 G/DL (ref 32–36)
MCV RBC AUTO: 78 FL (ref 82–98)
MONOCYTES # BLD AUTO: 0.8 K/UL (ref 0.3–1)
MONOCYTES NFR BLD: 13.4 % (ref 4–15)
NEUTROPHILS # BLD AUTO: 3.9 K/UL (ref 1.8–7.7)
NEUTROPHILS NFR BLD: 68.8 % (ref 38–73)
NRBC BLD-RTO: 0 /100 WBC
PLATELET # BLD AUTO: 218 K/UL (ref 150–450)
PMV BLD AUTO: 9 FL (ref 9.2–12.9)
POCT GLUCOSE: 205 MG/DL (ref 70–110)
POCT GLUCOSE: 95 MG/DL (ref 70–110)
POTASSIUM SERPL-SCNC: 3.2 MMOL/L (ref 3.5–5.1)
PROT SERPL-MCNC: 7.5 G/DL (ref 6–8.4)
RBC # BLD AUTO: 4.97 M/UL (ref 4.6–6.2)
SINUS: 3.41 CM
SODIUM SERPL-SCNC: 135 MMOL/L (ref 136–145)
TROPONIN I SERPL DL<=0.01 NG/ML-MCNC: 0.04 NG/ML (ref 0–0.03)
TROPONIN I SERPL DL<=0.01 NG/ML-MCNC: 0.05 NG/ML (ref 0–0.03)
TROPONIN I SERPL DL<=0.01 NG/ML-MCNC: 0.05 NG/ML (ref 0–0.03)
WBC # BLD AUTO: 5.61 K/UL (ref 3.9–12.7)

## 2023-01-05 PROCEDURE — 63600175 PHARM REV CODE 636 W HCPCS: Performed by: STUDENT IN AN ORGANIZED HEALTH CARE EDUCATION/TRAINING PROGRAM

## 2023-01-05 PROCEDURE — 25000003 PHARM REV CODE 250

## 2023-01-05 PROCEDURE — 37000009 HC ANESTHESIA EA ADD 15 MINS: Performed by: INTERNAL MEDICINE

## 2023-01-05 PROCEDURE — 92960 CARDIOVERSION ELECTRIC EXT: CPT | Performed by: INTERNAL MEDICINE

## 2023-01-05 PROCEDURE — 99291 CRITICAL CARE FIRST HOUR: CPT | Mod: 25

## 2023-01-05 PROCEDURE — D9220A PRA ANESTHESIA: Mod: ANES,,, | Performed by: ANESTHESIOLOGY

## 2023-01-05 PROCEDURE — G0378 HOSPITAL OBSERVATION PER HR: HCPCS

## 2023-01-05 PROCEDURE — 96376 TX/PRO/DX INJ SAME DRUG ADON: CPT

## 2023-01-05 PROCEDURE — 63600175 PHARM REV CODE 636 W HCPCS: Performed by: PHYSICIAN ASSISTANT

## 2023-01-05 PROCEDURE — 63600175 PHARM REV CODE 636 W HCPCS: Performed by: EMERGENCY MEDICINE

## 2023-01-05 PROCEDURE — 25000003 PHARM REV CODE 250: Performed by: INTERNAL MEDICINE

## 2023-01-05 PROCEDURE — 96372 THER/PROPH/DIAG INJ SC/IM: CPT | Mod: 59 | Performed by: INTERNAL MEDICINE

## 2023-01-05 PROCEDURE — D9220A PRA ANESTHESIA: ICD-10-PCS | Mod: CRNA,,, | Performed by: STUDENT IN AN ORGANIZED HEALTH CARE EDUCATION/TRAINING PROGRAM

## 2023-01-05 PROCEDURE — 25000003 PHARM REV CODE 250: Performed by: PHYSICIAN ASSISTANT

## 2023-01-05 PROCEDURE — 83880 ASSAY OF NATRIURETIC PEPTIDE: CPT | Performed by: EMERGENCY MEDICINE

## 2023-01-05 PROCEDURE — 93005 ELECTROCARDIOGRAM TRACING: CPT

## 2023-01-05 PROCEDURE — D9220A PRA ANESTHESIA: ICD-10-PCS | Mod: ANES,,, | Performed by: ANESTHESIOLOGY

## 2023-01-05 PROCEDURE — 83735 ASSAY OF MAGNESIUM: CPT

## 2023-01-05 PROCEDURE — D9220A PRA ANESTHESIA: Mod: CRNA,,, | Performed by: STUDENT IN AN ORGANIZED HEALTH CARE EDUCATION/TRAINING PROGRAM

## 2023-01-05 PROCEDURE — 85025 COMPLETE CBC W/AUTO DIFF WBC: CPT | Performed by: EMERGENCY MEDICINE

## 2023-01-05 PROCEDURE — 99214 OFFICE O/P EST MOD 30 MIN: CPT | Mod: 25,ICN,, | Performed by: INTERNAL MEDICINE

## 2023-01-05 PROCEDURE — 96374 THER/PROPH/DIAG INJ IV PUSH: CPT

## 2023-01-05 PROCEDURE — 63600175 PHARM REV CODE 636 W HCPCS: Performed by: INTERNAL MEDICINE

## 2023-01-05 PROCEDURE — 93010 EKG 12-LEAD: ICD-10-PCS | Mod: ,,, | Performed by: INTERNAL MEDICINE

## 2023-01-05 PROCEDURE — 99214 PR OFFICE/OUTPT VISIT, EST, LEVL IV, 30-39 MIN: ICD-10-PCS | Mod: 25,ICN,, | Performed by: INTERNAL MEDICINE

## 2023-01-05 PROCEDURE — 82962 GLUCOSE BLOOD TEST: CPT

## 2023-01-05 PROCEDURE — 25000003 PHARM REV CODE 250: Performed by: STUDENT IN AN ORGANIZED HEALTH CARE EDUCATION/TRAINING PROGRAM

## 2023-01-05 PROCEDURE — 37000008 HC ANESTHESIA 1ST 15 MINUTES: Performed by: INTERNAL MEDICINE

## 2023-01-05 PROCEDURE — 93010 ELECTROCARDIOGRAM REPORT: CPT | Mod: ,,, | Performed by: INTERNAL MEDICINE

## 2023-01-05 PROCEDURE — 83036 HEMOGLOBIN GLYCOSYLATED A1C: CPT

## 2023-01-05 PROCEDURE — 80053 COMPREHEN METABOLIC PANEL: CPT | Performed by: EMERGENCY MEDICINE

## 2023-01-05 PROCEDURE — 84484 ASSAY OF TROPONIN QUANT: CPT | Performed by: EMERGENCY MEDICINE

## 2023-01-05 PROCEDURE — 84484 ASSAY OF TROPONIN QUANT: CPT | Mod: 91 | Performed by: PHYSICIAN ASSISTANT

## 2023-01-05 PROCEDURE — 99285 EMERGENCY DEPT VISIT HI MDM: CPT | Mod: 25

## 2023-01-05 RX ORDER — AMIODARONE HYDROCHLORIDE 200 MG/1
400 TABLET ORAL 2 TIMES DAILY
Status: DISCONTINUED | OUTPATIENT
Start: 2023-01-05 | End: 2023-01-06 | Stop reason: HOSPADM

## 2023-01-05 RX ORDER — AMIODARONE HYDROCHLORIDE 200 MG/1
200 TABLET ORAL 2 TIMES DAILY
Status: DISCONTINUED | OUTPATIENT
Start: 2023-01-12 | End: 2023-01-06 | Stop reason: HOSPADM

## 2023-01-05 RX ORDER — HYDRALAZINE HYDROCHLORIDE 20 MG/ML
10 INJECTION INTRAMUSCULAR; INTRAVENOUS
Status: DISCONTINUED | OUTPATIENT
Start: 2023-01-05 | End: 2023-01-05

## 2023-01-05 RX ORDER — ATORVASTATIN CALCIUM 40 MG/1
80 TABLET, FILM COATED ORAL DAILY
Status: DISCONTINUED | OUTPATIENT
Start: 2023-01-05 | End: 2023-01-06 | Stop reason: HOSPADM

## 2023-01-05 RX ORDER — ENOXAPARIN SODIUM 100 MG/ML
40 INJECTION SUBCUTANEOUS EVERY 24 HOURS
Status: DISCONTINUED | OUTPATIENT
Start: 2023-01-05 | End: 2023-01-05

## 2023-01-05 RX ORDER — METOPROLOL SUCCINATE 50 MG/1
50 TABLET, EXTENDED RELEASE ORAL DAILY
Status: DISCONTINUED | OUTPATIENT
Start: 2023-01-05 | End: 2023-01-06 | Stop reason: HOSPADM

## 2023-01-05 RX ORDER — POTASSIUM CHLORIDE 20 MEQ/1
40 TABLET, EXTENDED RELEASE ORAL ONCE
Status: COMPLETED | OUTPATIENT
Start: 2023-01-05 | End: 2023-01-05

## 2023-01-05 RX ORDER — IBUPROFEN 200 MG
24 TABLET ORAL
Status: DISCONTINUED | OUTPATIENT
Start: 2023-01-05 | End: 2023-01-06 | Stop reason: HOSPADM

## 2023-01-05 RX ORDER — TAMSULOSIN HYDROCHLORIDE 0.4 MG/1
0.4 CAPSULE ORAL DAILY
Status: DISCONTINUED | OUTPATIENT
Start: 2023-01-05 | End: 2023-01-06 | Stop reason: HOSPADM

## 2023-01-05 RX ORDER — IBUPROFEN 200 MG
16 TABLET ORAL
Status: DISCONTINUED | OUTPATIENT
Start: 2023-01-05 | End: 2023-01-06 | Stop reason: HOSPADM

## 2023-01-05 RX ORDER — NITROGLYCERIN 0.4 MG/1
0.4 TABLET SUBLINGUAL EVERY 5 MIN PRN
Status: DISCONTINUED | OUTPATIENT
Start: 2023-01-05 | End: 2023-01-06 | Stop reason: HOSPADM

## 2023-01-05 RX ORDER — AMOXICILLIN 250 MG
1 CAPSULE ORAL DAILY PRN
Status: DISCONTINUED | OUTPATIENT
Start: 2023-01-05 | End: 2023-01-06 | Stop reason: HOSPADM

## 2023-01-05 RX ORDER — PROPOFOL 10 MG/ML
VIAL (ML) INTRAVENOUS
Status: DISCONTINUED | OUTPATIENT
Start: 2023-01-05 | End: 2023-01-05

## 2023-01-05 RX ORDER — LIDOCAINE HYDROCHLORIDE 20 MG/ML
INJECTION INTRAVENOUS
Status: DISCONTINUED | OUTPATIENT
Start: 2023-01-05 | End: 2023-01-05

## 2023-01-05 RX ORDER — FUROSEMIDE 10 MG/ML
80 INJECTION INTRAMUSCULAR; INTRAVENOUS DAILY
Status: DISCONTINUED | OUTPATIENT
Start: 2023-01-06 | End: 2023-01-05

## 2023-01-05 RX ORDER — SODIUM CHLORIDE 0.9 % (FLUSH) 0.9 %
10 SYRINGE (ML) INJECTION
Status: DISCONTINUED | OUTPATIENT
Start: 2023-01-05 | End: 2023-01-06 | Stop reason: HOSPADM

## 2023-01-05 RX ORDER — APIXABAN 5 MG/1
5 TABLET, FILM COATED ORAL 2 TIMES DAILY
COMMUNITY
Start: 2022-10-14

## 2023-01-05 RX ORDER — ONDANSETRON 2 MG/ML
4 INJECTION INTRAMUSCULAR; INTRAVENOUS EVERY 6 HOURS PRN
Status: DISCONTINUED | OUTPATIENT
Start: 2023-01-05 | End: 2023-01-06 | Stop reason: HOSPADM

## 2023-01-05 RX ORDER — ACETAMINOPHEN 500 MG
500 TABLET ORAL EVERY 6 HOURS PRN
Status: DISCONTINUED | OUTPATIENT
Start: 2023-01-05 | End: 2023-01-06 | Stop reason: HOSPADM

## 2023-01-05 RX ORDER — AMIODARONE HYDROCHLORIDE 200 MG/1
200 TABLET ORAL DAILY
Status: DISCONTINUED | OUTPATIENT
Start: 2023-01-20 | End: 2023-01-06 | Stop reason: HOSPADM

## 2023-01-05 RX ORDER — FUROSEMIDE 10 MG/ML
40 INJECTION INTRAMUSCULAR; INTRAVENOUS
Status: DISCONTINUED | OUTPATIENT
Start: 2023-01-05 | End: 2023-01-05

## 2023-01-05 RX ORDER — GLUCAGON 1 MG
1 KIT INJECTION
Status: DISCONTINUED | OUTPATIENT
Start: 2023-01-05 | End: 2023-01-06 | Stop reason: HOSPADM

## 2023-01-05 RX ORDER — AMOXICILLIN 250 MG
1 CAPSULE ORAL 2 TIMES DAILY
Status: DISCONTINUED | OUTPATIENT
Start: 2023-01-06 | End: 2023-01-05

## 2023-01-05 RX ORDER — FUROSEMIDE 10 MG/ML
40 INJECTION INTRAMUSCULAR; INTRAVENOUS ONCE
Status: COMPLETED | OUTPATIENT
Start: 2023-01-05 | End: 2023-01-05

## 2023-01-05 RX ORDER — POLYETHYLENE GLYCOL 3350 17 G/17G
17 POWDER, FOR SOLUTION ORAL DAILY
Status: DISCONTINUED | OUTPATIENT
Start: 2023-01-06 | End: 2023-01-05

## 2023-01-05 RX ORDER — FUROSEMIDE 10 MG/ML
60 INJECTION INTRAMUSCULAR; INTRAVENOUS
Status: COMPLETED | OUTPATIENT
Start: 2023-01-05 | End: 2023-01-05

## 2023-01-05 RX ORDER — FUROSEMIDE 40 MG/1
80 TABLET ORAL 2 TIMES DAILY
Status: DISCONTINUED | OUTPATIENT
Start: 2023-01-05 | End: 2023-01-05

## 2023-01-05 RX ORDER — TALC
6 POWDER (GRAM) TOPICAL NIGHTLY PRN
Status: DISCONTINUED | OUTPATIENT
Start: 2023-01-05 | End: 2023-01-06 | Stop reason: HOSPADM

## 2023-01-05 RX ORDER — HYDRALAZINE HYDROCHLORIDE 25 MG/1
50 TABLET, FILM COATED ORAL EVERY 8 HOURS
Status: DISCONTINUED | OUTPATIENT
Start: 2023-01-05 | End: 2023-01-06 | Stop reason: HOSPADM

## 2023-01-05 RX ORDER — HUMAN INSULIN 100 [USP'U]/ML
55 INJECTION, SUSPENSION SUBCUTANEOUS 2 TIMES DAILY
Status: ON HOLD | COMMUNITY
Start: 2022-12-08 | End: 2023-08-15 | Stop reason: HOSPADM

## 2023-01-05 RX ORDER — FAMOTIDINE 20 MG/1
20 TABLET, FILM COATED ORAL DAILY
Status: DISCONTINUED | OUTPATIENT
Start: 2023-01-05 | End: 2023-01-05

## 2023-01-05 RX ORDER — INSULIN ASPART 100 [IU]/ML
0-5 INJECTION, SOLUTION INTRAVENOUS; SUBCUTANEOUS
Status: DISCONTINUED | OUTPATIENT
Start: 2023-01-05 | End: 2023-01-06 | Stop reason: HOSPADM

## 2023-01-05 RX ADMIN — ATORVASTATIN CALCIUM 80 MG: 40 TABLET, FILM COATED ORAL at 01:01

## 2023-01-05 RX ADMIN — FUROSEMIDE 40 MG: 10 INJECTION, SOLUTION INTRAMUSCULAR; INTRAVENOUS at 06:01

## 2023-01-05 RX ADMIN — APIXABAN 5 MG: 5 TABLET, FILM COATED ORAL at 04:01

## 2023-01-05 RX ADMIN — HYDRALAZINE HYDROCHLORIDE 50 MG: 25 TABLET, FILM COATED ORAL at 09:01

## 2023-01-05 RX ADMIN — LIDOCAINE HYDROCHLORIDE 100 MG: 20 INJECTION, SOLUTION INTRAVENOUS at 02:01

## 2023-01-05 RX ADMIN — PROPOFOL 70 MG: 10 INJECTION, EMULSION INTRAVENOUS at 02:01

## 2023-01-05 RX ADMIN — POTASSIUM CHLORIDE 40 MEQ: 1500 TABLET, EXTENDED RELEASE ORAL at 01:01

## 2023-01-05 RX ADMIN — AMIODARONE HYDROCHLORIDE 400 MG: 200 TABLET ORAL at 09:01

## 2023-01-05 RX ADMIN — FUROSEMIDE 60 MG: 10 INJECTION, SOLUTION INTRAMUSCULAR; INTRAVENOUS at 11:01

## 2023-01-05 RX ADMIN — SODIUM CHLORIDE, SODIUM LACTATE, POTASSIUM CHLORIDE, AND CALCIUM CHLORIDE: .6; .31; .03; .02 INJECTION, SOLUTION INTRAVENOUS at 02:01

## 2023-01-05 RX ADMIN — PROPOFOL 30 MG: 10 INJECTION, EMULSION INTRAVENOUS at 02:01

## 2023-01-05 RX ADMIN — HYDRALAZINE HYDROCHLORIDE 50 MG: 25 TABLET, FILM COATED ORAL at 04:01

## 2023-01-05 RX ADMIN — TAMSULOSIN HYDROCHLORIDE 0.4 MG: 0.4 CAPSULE ORAL at 01:01

## 2023-01-05 RX ADMIN — INSULIN ASPART 1 UNITS: 100 INJECTION, SOLUTION INTRAVENOUS; SUBCUTANEOUS at 09:01

## 2023-01-05 RX ADMIN — APIXABAN 5 MG: 5 TABLET, FILM COATED ORAL at 09:01

## 2023-01-05 NOTE — CONSULTS
Follow-up Information       Indra Foote MD Follow up on 2/6/2023.    Specialties: Cardiology, Interventional Cardiology  Why: at 2:40pm  Contact information:  [address]  [address]  [facility] [address] [phone]

## 2023-01-05 NOTE — TRANSFER OF CARE
"Anesthesia Transfer of Care Note    Patient: Chato Bowie    Procedure(s) Performed: Procedure(s) (LRB):  Transesophageal echo (MARIA) intra-procedure log documentation (N/A)  Cardioversion or Defibrillation (N/A)    Patient location: Cath Lab    Anesthesia Type: general    Transport from OR: Transported from OR on room air with adequate spontaneous ventilation    Post pain: adequate analgesia    Post assessment: tolerated procedure well and no apparent anesthetic complications    Post vital signs: stable    Level of consciousness: lethargic and responds to stimulation    Nausea/Vomiting: no nausea/vomiting    Complications: none    Transfer of care protocol was followed      Last vitals:   Visit Vitals  BP (!) 161/77 (BP Location: Right arm, Patient Position: Lying)   Pulse 65   Temp 36 °C (96.8 °F) (Skin)   Resp 13   Ht 5' 11" (1.803 m)   Wt 100.2 kg (221 lb)   SpO2 98%   BMI 30.82 kg/m²     "

## 2023-01-05 NOTE — SUBJECTIVE & OBJECTIVE
Past Medical History:   Diagnosis Date    Acute congestive heart failure 3/20/2020    Alcohol abuse     Arthritis     Asthma attack 11/24/2021    Coronary artery disease     Diabetes mellitus     Hyperlipemia     Hypertension     Rhabdomyolysis        Past Surgical History:   Procedure Laterality Date    EYE SURGERY      TREATMENT OF CARDIAC ARRHYTHMIA N/A 12/7/2020    Procedure: Cardioversion or Defibrillation;  Surgeon: Indra Foote MD;  Location: [facility] CATH LAB;  Service: Cardiology;  Laterality: N/A;    TREATMENT OF CARDIAC ARRHYTHMIA N/A 12/30/2020    Procedure: Cardioversion or Defibrillation;  Surgeon: Tyrone Steen MD;  Location: [facility] CATH LAB;  Service: Cardiology;  Laterality: N/A;    VASCULAR SURGERY         Review of patient's allergies indicates:  No Known Allergies    No current facility-administered medications on file prior to encounter.     Current Outpatient Medications on File Prior to Encounter   Medication Sig    amiodarone (PACERONE) 200 MG Tab Take 1 tablet (200 mg total) by mouth once daily.    ELIQUIS 5 mg Tab Take 5 mg by mouth 2 (two) times daily.    furosemide (LASIX) 80 MG tablet Take 1 tablet (80 mg total) by mouth 2 (two) times daily.    hydrALAZINE (APRESOLINE) 25 MG tablet Take 2 tablets (50 mg total) by mouth every 8 (eight) hours.    magnesium oxide (MAG-OX) 400 mg (241.3 mg magnesium) tablet Take 800 mg by mouth.    metoprolol succinate (TOPROL-XL) 50 MG 24 hr tablet Take 1 tablet (50 mg total) by mouth once daily.    NOVOLIN 70/30 U-100 INSULIN 100 unit/mL (70-30) injection Inject 55 Units into the skin 2 (two) times daily.    potassium chloride SA (K-DUR,KLOR-CON) 20 MEQ tablet Take 2 tablets by mouth once daily.    tamsulosin (FLOMAX) 0.4 mg Cap Take 1 capsule (0.4 mg total) by mouth once daily.    albuterol (PROVENTIL/VENTOLIN HFA) 90 mcg/actuation inhaler Inhale 2 puffs into the lungs every 4 (four) hours as needed.    aspirin 81 MG Chew Take 1 tablet (81 mg total)  by mouth once daily.    famotidine (PEPCID) 40 MG tablet Take 40 mg by mouth once daily.    insulin aspart U-100 (NOVOLOG) 100 unit/mL (3 mL) InPn pen Inject 5 Units into the skin 3 (three) times daily.    metOLazone (ZAROXOLYN) 5 MG tablet TAKE 1 TABLET BY MOUTH 3 TIMES A WEEK AS NEEDED FOR SEVERE LEG SWELLING    multivitamin Tab Take 1 tablet by mouth once daily.    rosuvastatin (CRESTOR) 20 MG tablet Take 20 mg by mouth once daily.    sildenafiL (VIAGRA) 100 MG tablet TAKE 1 TABLET BY MOUTH ONCE DAILY AS NEEDED FOR ERECTILE DYSFUNCTION.    [DISCONTINUED] DUREZOL 0.05 % Drop ophthalmic solution INT 1 GTT INTO OD FID FOR 3 WEEKS     Family History       Problem Relation (Age of Onset)    Diabetes Mother, Father, Sister    Hypertension Mother, Father, Sister          Tobacco Use    Smoking status: Never    Smokeless tobacco: Never   Substance and Sexual Activity    Alcohol use: Not Currently     Alcohol/week: 4.0 standard drinks     Types: 4 Cans of beer per week    Drug use: No    Sexual activity: Yes     Partners: Female     Review of Systems   Constitutional:  Negative for chills and fever.   HENT:  Negative for nosebleeds and tinnitus.    Eyes:  Negative for photophobia and visual disturbance.   Respiratory:  Positive for shortness of breath. Negative for wheezing.    Cardiovascular:  Positive for chest pain. Negative for palpitations and leg swelling.   Gastrointestinal:  Negative for abdominal distention, nausea and vomiting.   Genitourinary:  Negative for dysuria, flank pain and hematuria.   Musculoskeletal:  Negative for gait problem and joint swelling.   Skin:  Negative for rash and wound.   Neurological:  Negative for seizures and syncope.   Objective:     Vital Signs (Most Recent):  Temp: 98 °F (36.7 °C) (01/05/23 0919)  Pulse: (!) 52 (01/05/23 1029)  Resp: 20 (01/05/23 0919)  BP: (!) 165/72 (01/05/23 1029)  SpO2: 97 % (01/05/23 0959)   Vital Signs (24h Range):  Temp:  [98 °F (36.7 °C)] 98 °F (36.7  °C)  Pulse:  [49-52] 52  Resp:  [20] 20  SpO2:  [97 %] 97 %  BP: (165-198)/(72-88) 165/72     Weight: 100.2 kg (221 lb)  Body mass index is 30.82 kg/m².    Physical Exam  Vitals and nursing note reviewed.   Constitutional:       General: He is not in acute distress.     Appearance: He is well-developed.   HENT:      Head: Normocephalic and atraumatic.      Right Ear: External ear normal.      Left Ear: External ear normal.   Eyes:      General:         Right eye: No discharge.         Left eye: No discharge.      Conjunctiva/sclera: Conjunctivae normal.   Neck:      Thyroid: No thyromegaly.   Cardiovascular:      Rate and Rhythm: Normal rate. Rhythm irregular.      Heart sounds: No murmur heard.  Pulmonary:      Effort: Pulmonary effort is normal. No respiratory distress.      Breath sounds: Normal breath sounds.   Abdominal:      General: Bowel sounds are normal. There is no distension.      Palpations: Abdomen is soft. There is no mass.      Tenderness: There is no abdominal tenderness.   Musculoskeletal:         General: No deformity.      Cervical back: Normal range of motion.      Right lower leg: No edema.      Left lower leg: No edema.   Skin:     General: Skin is warm and dry.   Neurological:      Mental Status: He is alert and oriented to person, place, and time.      Sensory: No sensory deficit.   Psychiatric:         Mood and Affect: Mood normal.         Behavior: Behavior normal.           Significant Labs: CBC:   Recent Labs   Lab 01/05/23  1032   WBC 5.61   HGB 13.5*   HCT 38.9*        CMP:   Recent Labs   Lab 01/05/23  1032   *   K 3.2*   CL 98   CO2 24   *   BUN 64*   CREATININE 2.5*   CALCIUM 9.2   PROT 7.5   ALBUMIN 3.6   BILITOT 0.8   ALKPHOS 72   AST 23   ALT 24   ANIONGAP 13     Cardiac Markers:   Recent Labs   Lab 01/05/23  1032   *     Troponin:   Recent Labs   Lab 01/05/23  1032   TROPONINI 0.050*       Significant Imaging:   Imaging Results              X-Ray Chest  AP Portable (Final result)  Result time 01/05/23 11:34:36      Final result by Jose L Turcios Jr., MD (01/05/23 11:34:36)                   Impression:      Cardiac enlargement and pulmonary venous congestion      Electronically signed by: Jose L Pineda Jr  Date:    01/05/2023  Time:    11:34               Narrative:    EXAMINATION:  XR CHEST AP PORTABLE    CLINICAL HISTORY:  Chest Pain;    TECHNIQUE:  Single frontal view of the chest was performed.    COMPARISON:  05/12/2022    FINDINGS:  Cardiomediastinal silhouetteremains mildly enlarged.    There may be some mild pulmonary vascular congestion but no karen edema given the limitations of portable technique.    Pleura, diaphragm, and thoracic cage grossly unremarkable.

## 2023-01-05 NOTE — ANESTHESIA PREPROCEDURE EVALUATION
01/05/2023  Chato Bowie is a 72 y.o., male with pmh as listed below for MARIA cardioversion.  Pt presented to the ED this morning with substernal crushing pain associated with SOB.  Found to be in aflutter.  Pt has hx of aflutter ot on anticoagulation  Troponin 0.050    Past Medical History:   Diagnosis Date    Acute congestive heart failure 3/20/2020    Alcohol abuse     Arthritis     Asthma attack 11/24/2021    Coronary artery disease     Diabetes mellitus     Hyperlipemia     Hypertension     Rhabdomyolysis        Pre-op Assessment    I have reviewed the Patient Summary Reports.     I have reviewed the Nursing Notes.    I have reviewed the Medications.     Review of Systems  Anesthesia Hx:  No problems with previous Anesthesia Denies Hx of Anesthetic complications  Neg history of prior surgery. Denies Family Hx of Anesthesia complications.   Denies Personal Hx of Anesthesia complications.   Social:  Non-Smoker, No Alcohol Use    Hematology/Oncology:  Hematology Normal   Oncology Normal     EENT/Dental:EENT/Dental Normal   Cardiovascular:   Hypertension, poorly controlled Past MI CAD   Stress 2020: moderate intensity fix defect in inferior wall of LV   Pulmonary:   Pneumonia Asthma    Renal/:   Chronic Renal Disease, ARF, CKD    Hepatic/GI:   GERD    Musculoskeletal:  Musculoskeletal Normal    Neurological:   Neuromuscular Disease,    Endocrine:   Diabetes, type 2    Dermatological:  Skin Normal    Psych:  Psychiatric Normal       Results for orders placed during the hospital encounter of 02/05/22    Echo    Interpretation Summary  · The estimated ejection fraction is 55%.  · The left ventricle is normal in size with normal systolic function.  · Indeterminate left ventricular diastolic function.  · Mild aortic regurgitation.  · Mild to moderate tricuspid regurgitation.  · Normal right  ventricular size with normal right ventricular systolic function.  · There is moderate aortic valve stenosis.  · Aortic valve area is 1.30 cm2; peak velocity is 2.90 m/s; mean gradient is 22 mmHg.  · Moderate left atrial enlargement.  · Mild right atrial enlargement.  · Mild mitral regurgitation.  · The estimated PA systolic pressure is 53 mmHg.  · Intermediate central venous pressure (8 mmHg).  · There is pulmonary hypertension.      Physical Exam  General: Well nourished and Flat Affect    Airway:  Mallampati: II   Mouth Opening: Normal  Tongue: Normal  Neck ROM: Normal ROM    Dental:  Intact    Chest/Lungs:  Clear to auscultation    Heart:  Rate: Normal  Rhythm: Regular Rhythm  Sounds: Normal        Anesthesia Plan  Type of Anesthesia, risks & benefits discussed:    Anesthesia Type: Gen Natural Airway  Intra-op Monitoring Plan: Standard ASA Monitors  Induction:  IV  Informed Consent: Informed consent signed with the Patient and all parties understand the risks and agree with anesthesia plan.  All questions answered. Patient consented to blood products? Yes  ASA Score: 3    Ready For Surgery From Anesthesia Perspective.     .  Lab Results   Component Value Date    WBC 5.61 01/05/2023    HGB 13.5 (L) 01/05/2023    HCT 38.9 (L) 01/05/2023    MCV 78 (L) 01/05/2023     01/05/2023         Chemistry        Component Value Date/Time     (L) 01/05/2023 1032    K 3.2 (L) 01/05/2023 1032    CL 98 01/05/2023 1032    CO2 24 01/05/2023 1032    BUN 64 (H) 01/05/2023 1032    CREATININE 2.5 (H) 01/05/2023 1032     (H) 01/05/2023 1032        Component Value Date/Time    CALCIUM 9.2 01/05/2023 1032    ALKPHOS 72 01/05/2023 1032    AST 23 01/05/2023 1032    ALT 24 01/05/2023 1032    BILITOT 0.8 01/05/2023 1032    ESTGFRAFRICA 40 (A) 05/14/2022 0525    EGFRNONAA 35 (A) 05/14/2022 0525

## 2023-01-05 NOTE — CONSULTS
Food & Nutrition  Education    Diet Education: Low na and fluid restriction  Time Spent: RD remote  Learners: Patient       Nutrition Education provided with handouts:   + Clinical Reference attachments to d/c documents      Comments:  Patient npo at this time.  Labs reviewed.  NKFA     BMP  Lab Results   Component Value Date     (L) 01/05/2023    K 3.2 (L) 01/05/2023    CL 98 01/05/2023    CO2 24 01/05/2023    BUN 64 (H) 01/05/2023    CREATININE 2.5 (H) 01/05/2023    CALCIUM 9.2 01/05/2023    ANIONGAP 13 01/05/2023    EGFRNORACEVR 27 (A) 01/05/2023     Lab Results   Component Value Date    HGBA1C 6.9 (H) 05/12/2022     Lab Results   Component Value Date    LABPROT 11.5 09/05/2021    ALBUMIN 3.6 01/05/2023     Scheduled Meds:   amiodarone  400 mg Oral BID    Followed by    [START ON 1/12/2023] amiodarone  200 mg Oral BID    Followed by    [START ON 1/20/2023] amiodarone  200 mg Oral Daily    apixaban  5 mg Oral BID    atorvastatin  80 mg Oral Daily    furosemide (LASIX) injection  40 mg Intravenous Once    hydrALAZINE  50 mg Oral Q8H    metoprolol succinate  50 mg Oral Daily    tamsulosin  0.4 mg Oral Daily     Continuous Infusions:  PRN Meds:.acetaminophen, glucagon (human recombinant), glucose, glucose, insulin aspart U-100, melatonin, nitroGLYCERIN, ondansetron, senna-docusate 8.6-50 mg, sodium chloride 0.9%        All questions and concerns answered. Dietitian's contact information provided.       Follow-Up: yes     Please Re-consult as needed        Thanks!  Connie Christina, MS, RDN, LDN

## 2023-01-05 NOTE — H&P
[facility] [place] Emergency Dept  [facility] Medicine  History & Physical    Patient Name: Chato Bowie  MRN: [id number]  Patient Class: OP- Observation  Admission Date: 1/5/2023  Attending Physician: Volodymyr Hunt MD   Primary Care Provider: [facility]         Patient information was obtained from patient, past medical records and ER records.     Subjective:     Principal Problem:Typical atrial flutter    Chief Complaint:   Chief Complaint   Patient presents with    Chest Pain     Pt reports midsternal chest pain that started last night and worsened this morning @ 7 pm. EMS gave 324 of aspirin and 3 nitro with no relief. Hx of CHF reported         HPI: Chato Bowie 72 y.o. male with atrial flutter, HFpEF, CKD, HTN, DM, presents to the hospital with a chief complaint of chest pain.  He reports today he woke to experience a pounding left-sided and right-sided chest pain has improved with treatment in the ER and worsens with palpation of his chest.  He denies any attempted treatment at home.  This was associated with shortness of breath that worsens with lying flat and is improved with Lasix in the ER.  He reports he is compliant with his low-salt diet and home diuretic.  The Chest pain did not radiate.  He denies fever nausea vomiting abdominal pain syncope dizziness dysuria melena hematuria hematemesis.    In the ED, EKG of atrial flutter chest x-ray pulmonary edema  troponin 0.05 creatinine 2.5 afebrile without leukocytosis potassium 3.2.      Past Medical History:   Diagnosis Date    Acute congestive heart failure 3/20/2020    Alcohol abuse     Arthritis     Asthma attack 11/24/2021    Coronary artery disease     Diabetes mellitus     Hyperlipemia     Hypertension     Rhabdomyolysis        Past Surgical History:   Procedure Laterality Date    EYE SURGERY      TREATMENT OF CARDIAC ARRHYTHMIA N/A 12/7/2020    Procedure: Cardioversion or Defibrillation;  Surgeon: Indra Foote MD;  Location: [facility]  CATH LAB;  Service: Cardiology;  Laterality: N/A;    TREATMENT OF CARDIAC ARRHYTHMIA N/A 12/30/2020    Procedure: Cardioversion or Defibrillation;  Surgeon: Tyrone Steen MD;  Location: [facility] CATH LAB;  Service: Cardiology;  Laterality: N/A;    VASCULAR SURGERY         Review of patient's allergies indicates:  No Known Allergies    No current facility-administered medications on file prior to encounter.     Current Outpatient Medications on File Prior to Encounter   Medication Sig    amiodarone (PACERONE) 200 MG Tab Take 1 tablet (200 mg total) by mouth once daily.    ELIQUIS 5 mg Tab Take 5 mg by mouth 2 (two) times daily.    furosemide (LASIX) 80 MG tablet Take 1 tablet (80 mg total) by mouth 2 (two) times daily.    hydrALAZINE (APRESOLINE) 25 MG tablet Take 2 tablets (50 mg total) by mouth every 8 (eight) hours.    magnesium oxide (MAG-OX) 400 mg (241.3 mg magnesium) tablet Take 800 mg by mouth.    metoprolol succinate (TOPROL-XL) 50 MG 24 hr tablet Take 1 tablet (50 mg total) by mouth once daily.    NOVOLIN 70/30 U-100 INSULIN 100 unit/mL (70-30) injection Inject 55 Units into the skin 2 (two) times daily.    potassium chloride SA (K-DUR,KLOR-CON) 20 MEQ tablet Take 2 tablets by mouth once daily.    tamsulosin (FLOMAX) 0.4 mg Cap Take 1 capsule (0.4 mg total) by mouth once daily.    albuterol (PROVENTIL/VENTOLIN HFA) 90 mcg/actuation inhaler Inhale 2 puffs into the lungs every 4 (four) hours as needed.    aspirin 81 MG Chew Take 1 tablet (81 mg total) by mouth once daily.    famotidine (PEPCID) 40 MG tablet Take 40 mg by mouth once daily.    insulin aspart U-100 (NOVOLOG) 100 unit/mL (3 mL) InPn pen Inject 5 Units into the skin 3 (three) times daily.    metOLazone (ZAROXOLYN) 5 MG tablet TAKE 1 TABLET BY MOUTH 3 TIMES A WEEK AS NEEDED FOR SEVERE LEG SWELLING    multivitamin Tab Take 1 tablet by mouth once daily.    rosuvastatin (CRESTOR) 20 MG tablet Take 20 mg by mouth once daily.    sildenafiL (VIAGRA) 100  MG tablet TAKE 1 TABLET BY MOUTH ONCE DAILY AS NEEDED FOR ERECTILE DYSFUNCTION.    [DISCONTINUED] DUREZOL 0.05 % Drop ophthalmic solution INT 1 GTT INTO OD FID FOR 3 WEEKS     Family History       Problem Relation (Age of Onset)    Diabetes Mother, Father, Sister    Hypertension Mother, Father, Sister          Tobacco Use    Smoking status: Never    Smokeless tobacco: Never   Substance and Sexual Activity    Alcohol use: Not Currently     Alcohol/week: 4.0 standard drinks     Types: 4 Cans of beer per week    Drug use: No    Sexual activity: Yes     Partners: Female     Review of Systems   Constitutional:  Negative for chills and fever.   HENT:  Negative for nosebleeds and tinnitus.    Eyes:  Negative for photophobia and visual disturbance.   Respiratory:  Positive for shortness of breath. Negative for wheezing.    Cardiovascular:  Positive for chest pain. Negative for palpitations and leg swelling.   Gastrointestinal:  Negative for abdominal distention, nausea and vomiting.   Genitourinary:  Negative for dysuria, flank pain and hematuria.   Musculoskeletal:  Negative for gait problem and joint swelling.   Skin:  Negative for rash and wound.   Neurological:  Negative for seizures and syncope.   Objective:     Vital Signs (Most Recent):  Temp: 98 °F (36.7 °C) (01/05/23 0919)  Pulse: (!) 52 (01/05/23 1029)  Resp: 20 (01/05/23 0919)  BP: (!) 165/72 (01/05/23 1029)  SpO2: 97 % (01/05/23 0959)   Vital Signs (24h Range):  Temp:  [98 °F (36.7 °C)] 98 °F (36.7 °C)  Pulse:  [49-52] 52  Resp:  [20] 20  SpO2:  [97 %] 97 %  BP: (165-198)/(72-88) 165/72     Weight: 100.2 kg (221 lb)  Body mass index is 30.82 kg/m².    Physical Exam  Vitals and nursing note reviewed.   Constitutional:       General: He is not in acute distress.     Appearance: He is well-developed.   HENT:      Head: Normocephalic and atraumatic.      Right Ear: External ear normal.      Left Ear: External ear normal.   Eyes:      General:         Right eye: No  discharge.         Left eye: No discharge.      Conjunctiva/sclera: Conjunctivae normal.   Neck:      Thyroid: No thyromegaly.   Cardiovascular:      Rate and Rhythm: Normal rate. Rhythm irregular.      Heart sounds: No murmur heard.  Pulmonary:      Effort: Pulmonary effort is normal. No respiratory distress.      Breath sounds: Normal breath sounds.   Abdominal:      General: Bowel sounds are normal. There is no distension.      Palpations: Abdomen is soft. There is no mass.      Tenderness: There is no abdominal tenderness.   Musculoskeletal:         General: No deformity.      Cervical back: Normal range of motion.      Right lower leg: No edema.      Left lower leg: No edema.   Skin:     General: Skin is warm and dry.   Neurological:      Mental Status: He is alert and oriented to person, place, and time.      Sensory: No sensory deficit.   Psychiatric:         Mood and Affect: Mood normal.         Behavior: Behavior normal.           Significant Labs: CBC:   Recent Labs   Lab 01/05/23  1032   WBC 5.61   HGB 13.5*   HCT 38.9*        CMP:   Recent Labs   Lab 01/05/23  1032   *   K 3.2*   CL 98   CO2 24   *   BUN 64*   CREATININE 2.5*   CALCIUM 9.2   PROT 7.5   ALBUMIN 3.6   BILITOT 0.8   ALKPHOS 72   AST 23   ALT 24   ANIONGAP 13     Cardiac Markers:   Recent Labs   Lab 01/05/23  1032   *     Troponin:   Recent Labs   Lab 01/05/23  1032   TROPONINI 0.050*       Significant Imaging:   Imaging Results              X-Ray Chest AP Portable (Final result)  Result time 01/05/23 11:34:36      Final result by Jose L Turcios Jr., MD (01/05/23 11:34:36)                   Impression:      Cardiac enlargement and pulmonary venous congestion      Electronically signed by: Jose L Pineda Jr  Date:    01/05/2023  Time:    11:34               Narrative:    EXAMINATION:  XR CHEST AP PORTABLE    CLINICAL HISTORY:  Chest Pain;    TECHNIQUE:  Single frontal view of the chest was  performed.    COMPARISON:  05/12/2022    FINDINGS:  Cardiomediastinal silhouetteremains mildly enlarged.    There may be some mild pulmonary vascular congestion but no karen edema given the limitations of portable technique.    Pleura, diaphragm, and thoracic cage grossly unremarkable.                                        Assessment/Plan:     * Typical atrial flutter  Previous history of AFL, presents today in AFL possibly cause of volume overload. Not currently on OAC  Started on eliquis  Reloaded with amiodarone continue metoprolol  Cardiology consulted  Echo  Telemetry  TSH/Mg pending    Acute on chronic diastolic (congestive) heart failure  Previous echo with EF of 55% and indeterminate diastolic dysfunction. Appears more euvolemic on exam after IV lasix in the ED. Now on RA. BNP elevated at 804. Chest x-ray review with pulmonary edema  Repeat echo  Continue IV lasix  Daily weights/strict intake and output/telemetry    ADDENDUM: reports continued SOB after cardioversion. Reports improved, but not yet at baseline. Will continue IV lasix tonight.     Echo    Interpretation Summary  · The estimated ejection fraction is 55%.  · The left ventricle is normal in size with normal systolic function.  · Indeterminate left ventricular diastolic function.  · Mild aortic regurgitation.  · Mild to moderate tricuspid regurgitation.  · Normal right ventricular size with normal right ventricular systolic function.  · There is moderate aortic valve stenosis.  · Aortic valve area is 1.30 cm2; peak velocity is 2.90 m/s; mean gradient is 22 mmHg.  · Moderate left atrial enlargement.  · Mild right atrial enlargement.  · Mild mitral regurgitation.  · The estimated PA systolic pressure is 53 mmHg.  · Intermediate central venous pressure (8 mmHg).  · There is pulmonary hypertension.  Recent Labs   Lab 01/05/23  1032   *   .      Chest pain  See above. Appears reproducible on exam    Aortic stenosis, moderate  Repeat echo    CKD  (chronic kidney disease), stage III  Cr today of 2.5 baseline of 1.9 to 2.0  Renal dose medications, avoid nephrotoxic drugs monitor intake and output  Not on ACE/ARB outpt.   Will check UA    Hypokalemia  Given oral repletion in the ED. Will repeat and check magnesium    Type 2 diabetes mellitus with kidney complication, with long-term current use of insulin  Patient's FSGs are controlled on current medication regimen.  Last A1c reviewed-   Lab Results   Component Value Date    HGBA1C 6.9 (H) 05/12/2022     Most recent fingerstick glucose reviewed- No results for input(s): POCTGLUCOSE in the last 24 hours.  Current correctional scale  Low  Maintain anti-hyperglycemic dose as follows-   Antihyperglycemics (From admission, onward)      Start     Stop Route Frequency Ordered    01/05/23 1440  insulin aspart U-100 pen 0-5 Units         -- SubQ Before meals & nightly PRN 01/05/23 1340          Hold Oral hypoglycemics while patient is in the hospital.    Hyperlipidemia  continue home statin    Essential hypertension  Poorly controlled. Continue home hydralazine, metoprolol.      VTE Risk Mitigation (From admission, onward)           Ordered     apixaban tablet 5 mg  2 times daily         01/05/23 1316     IP VTE HIGH RISK PATIENT  Once         01/05/23 1205     Place sequential compression device  Until discontinued         01/05/23 1205                  VTE:eliquis   Code: full  Diet: diabetic/renal fluid restriction  Dispo: pending cardioversion  As clarification, on 1/5/2023, patient should be admitted for hospital observation services under my care in collaboration with Volodymyr Hunt MD. Emanuel Soliz PA-C  Department of Hospital Medicine   [facility] [place] - Emergency Dept

## 2023-01-05 NOTE — ED PROVIDER NOTES
Encounter Date: 1/5/2023       History     Chief Complaint   Patient presents with    Chest Pain     Pt reports midsternal chest pain that started last night and worsened this morning @ 7 pm. EMS gave 324 of aspirin and 3 nitro with no relief. Hx of CHF reported      The patient is a 72-year-old with the below past medical history who presents by ambulance.  He complains of chest pain that began last night while he was lying in bed.  The pain is located across both sides of his upper chest.  He describes the pain as pressure-like and pounding.  It has worsened since onset.  It is currently severe.  He has associated shortness of breath.  He was unable to sleep because of this pain.  There are no exacerbating factors.  He received aspirin and sublingual nitroglycerin from the medics during transport which provided no relief.  He has chronic lower extremity edema for which he takes a diuretic.  He denies acute worsening.  He had lightheadedness this morning and felt like he was going to faint.  His primary provider is [facility].  He has an appointment there today but was feeling too ill this morning to go.  He is not currently followed by a cardiologist.    The history is provided by the patient. No  was used.   Review of patient's allergies indicates:  No Known Allergies  Past Medical History:   Diagnosis Date    Acute congestive heart failure 3/20/2020    Alcohol abuse     Arthritis     Asthma attack 11/24/2021    Coronary artery disease     Diabetes mellitus     Hyperlipemia     Hypertension     Rhabdomyolysis      Past Surgical History:   Procedure Laterality Date    EYE SURGERY      TREATMENT OF CARDIAC ARRHYTHMIA N/A 12/7/2020    Procedure: Cardioversion or Defibrillation;  Surgeon: Indra Foote MD;  Location: [facility] CATH LAB;  Service: Cardiology;  Laterality: N/A;    TREATMENT OF CARDIAC ARRHYTHMIA N/A 12/30/2020    Procedure: Cardioversion or Defibrillation;  Surgeon: Tyrone Steen  MD;  Location: [facility] CATH LAB;  Service: Cardiology;  Laterality: N/A;    VASCULAR SURGERY       Family History   Problem Relation Age of Onset    Hypertension Mother     Diabetes Mother     Diabetes Father     Hypertension Father     Diabetes Sister     Hypertension Sister      Social History     Tobacco Use    Smoking status: Never    Smokeless tobacco: Never   Substance Use Topics    Alcohol use: Not Currently     Alcohol/week: 4.0 standard drinks     Types: 4 Cans of beer per week    Drug use: No     Review of Systems   Constitutional:  Positive for fatigue. Negative for chills and fever.   HENT:  Negative for congestion.    Eyes:  Positive for visual disturbance (Chronic, unchanged blurred vision).   Respiratory:  Positive for shortness of breath.    Cardiovascular:  Positive for chest pain and leg swelling (Chronic, unchanged lower extremity edema).   Gastrointestinal:  Negative for abdominal pain, diarrhea, nausea and vomiting.   Endocrine: Negative for polydipsia and polyuria.   Genitourinary:  Negative for difficulty urinating.   Musculoskeletal:  Negative for arthralgias and myalgias.   Skin:  Negative for rash.   Neurological:  Positive for light-headedness. Negative for headaches.        + near syncope     Physical Exam     Initial Vitals [01/05/23 0919]   BP Pulse Resp Temp SpO2   (!) 198/80 (!) 49 20 98 °F (36.7 °C) 97 %      MAP       --         Physical Exam    Nursing note and vitals reviewed.  Constitutional: He is not diaphoretic. No distress.   HENT:   Head: Normocephalic and atraumatic.   Mouth/Throat: Oropharynx is clear and moist.   Neck: No JVD present.   Cardiovascular:  Regular rhythm.   Bradycardia present.         Murmur heard.  Systolic murmur is present with a grade of 1/6.  Pulses:       Radial pulses are 2+ on the right side.        Dorsalis pedis pulses are 2+ on the right side and 2+ on the left side.   Pulmonary/Chest: No stridor. No respiratory distress. He has no wheezes. He has  no rhonchi.   Abdominal: Abdomen is protuberant. There is no abdominal tenderness.   Musculoskeletal:         General: No tenderness or edema.     Neurological: He is alert and oriented to person, place, and time. GCS score is 15. GCS eye subscore is 4. GCS verbal subscore is 5. GCS motor subscore is 6.   Skin: Skin is warm and dry. No pallor.   Psychiatric: He has a normal mood and affect. His speech is normal and behavior is normal. He is not actively hallucinating. He is attentive.       ED Course   Procedures  Labs Reviewed   CBC W/ AUTO DIFFERENTIAL - Abnormal; Notable for the following components:       Result Value    Hemoglobin 13.5 (*)     Hematocrit 38.9 (*)     MCV 78 (*)     MPV 9.0 (*)     Lymph # 0.7 (*)     Lymph % 13.0 (*)     All other components within normal limits   COMPREHENSIVE METABOLIC PANEL - Abnormal; Notable for the following components:    Sodium 135 (*)     Potassium 3.2 (*)     Glucose 154 (*)     BUN 64 (*)     Creatinine 2.5 (*)     eGFR 27 (*)     All other components within normal limits   TROPONIN I - Abnormal; Notable for the following components:    Troponin I 0.050 (*)     All other components within normal limits   B-TYPE NATRIURETIC PEPTIDE - Abnormal; Notable for the following components:     (*)     All other components within normal limits   TROPONIN I     EKG Readings: (Independently Interpreted)   01/05/2023 09:24   Atrial flutter with variable AV block.  Ventricular rate 58 beats per minute.  Left axis deviation.  Nonspecific intraventricular block.  No ST segment elevation or depression.     Imaging Results              X-Ray Chest AP Portable (Final result)  Result time 01/05/23 11:34:36      Final result by Jose L Turcios Jr., MD (01/05/23 11:34:36)                   Impression:      Cardiac enlargement and pulmonary venous congestion      Electronically signed by: Jose L Pineda Jr  Date:    01/05/2023  Time:    11:34               Narrative:     EXAMINATION:  XR CHEST AP PORTABLE    CLINICAL HISTORY:  Chest Pain;    TECHNIQUE:  Single frontal view of the chest was performed.    COMPARISON:  05/12/2022    FINDINGS:  Cardiomediastinal silhouetteremains mildly enlarged.    There may be some mild pulmonary vascular congestion but no karen edema given the limitations of portable technique.    Pleura, diaphragm, and thoracic cage grossly unremarkable.                                       Medications   hydrALAZINE injection 10 mg (0 mg Intravenous Hold 1/5/23 1015)   furosemide injection 60 mg (60 mg Intravenous Given 1/5/23 1159)     Medical Decision Making:   History:   Old Medical Records: I decided to obtain old medical records.  Old Records Summarized: other records.       <> Summary of Records: Past or coronary artery disease, essential hypertension, hyperlipidemia, mild protein malnutrition,, CHF, paroxysmal atrial flutter, type 2 diabetes mellitus, anemia of chronic disease, alcohol, NSTEMI, and stage IV CKD.      His last admission here was in May of 2022 for acute CHF.  He was admitted for two days.    Most recent system echocardiogram dated 02/06/2022:  Estimated EF 55%.  Normal left ventricular size and systolic function.  Indeterminate left ventricular diastolic function.  Mild aortic regurgitation.  Mild-to-moderate tricuspid regurgitation.  Normal right ventricular size and systolic function.  Moderate aortic valve stenosis.  Moderate left atrial enlargement.  Mild right atrial enlargement.  Mild mitral regurgitation.  Pulmonary hypertension with estimated PA systolic pressure of 53 mm Hg.  Intermediate central venous pressure (8 mm Hg).  Differential Diagnosis:   Acute coronary syndromes, symptomatic bradycardia, decompensated heart failure, pericarditis, pericardial effusion, spontaneous pneumothorax, pleural effusion  Independently Interpreted Test(s):   I have ordered and independently interpreted EKG Reading(s) - see prior notes  Clinical  Tests:   Lab Tests: Ordered and Reviewed  Radiological Study: Ordered and Reviewed  Medical Tests: Ordered and Reviewed    MDM:  This was an emergent ED evaluation.  Differential diagnoses included the above and other conditions.  Workup consisted of close monitoring, serial exams, EKG, chest x-ray, labs.  EKG revealed atrial flutter with slow heart rate.  Upon review of prior vital signs, the patient's heart rate was significantly lower than his baseline.  He did not appear to be in respiratory distress.  Chest x-ray revealed cardiomegaly with pulmonary vascular congestion.  Troponin was mildly elevated.  BNP was approximately 800.  IV Lasix was administered.  His presentation and workup are most consistent with symptomatic bradycardia and CHF exacerbation.  He was admitted to Hospital Medicine for close monitoring, diuresis, and serial troponins.          Attending Attestation:         Attending Critical Care:   Critical Care Times:   Direct Patient Care (initial evaluation, reassessments, and time considering the case)................................................................20 minutes.   Additional History from reviewing old medical records or taking additional history from the family, EMS, PCP, etc.......................5 minutes.   Ordering, Reviewing, and Interpreting Diagnostic Studies...............................................................................................................3 minutes.   Documentation..................................................................................................................................................................................5 minutes.   Consultation with other Physicians. .................................................................................................................................................2 minutes.   ==============================================================  Total Critical Care Time - exclusive of  procedural time: 35 minutes.  ==============================================================  Critical care was necessary to treat or prevent imminent or life-threatening deterioration of the following conditions: cardiac arrhythmia, myocardial infarction, congestive heart failure and renal failure.   Critical care was time spent personally by me on the following activities: obtaining history from patient or relative, examination of patient, review of old charts, ordering lab, x-rays, and/or EKG, development of treatment plan with patient or relative, ordering and performing treatments and interventions, evaluation of patient's response to treatment, discussion with consultants, interpretation of cardiac measurements and re-evaluation of patient's conition.   Critical Care Condition: potentially life-threatening         ED Course as of 01/05/23 1204   Thu Jan 05, 2023   1156 Called and discussed the patient's presentation, exam, workup, and ED management with the on-call hospital medicine CHAD, Zion Yepez, who agrees to admit the patient. [LP]      ED Course User Index  [LP] Deon Garcia III, MD                 Clinical Impression:   Final diagnoses:  [R07.9] Chest pain  [I50.9] Acute on chronic congestive heart failure, unspecified heart failure type (Primary)  [R00.1] Symptomatic bradycardia  [N28.9, N18.9] Acute on chronic renal insufficiency        ED Disposition Condition    Observation Stable                Deon Garcia III, MD  01/05/23 1203

## 2023-01-05 NOTE — HPI
72-year-old with the below past medical history who presents by ambulance.  He complains of chest pain that began last night while he was lying in bed.  The pain is located across both sides of his upper chest.  He describes the pain as pressure-like and pounding.  It has worsened since onset.  It is currently severe.  He has associated shortness of breath.  He was unable to sleep because of this pain.  There are no exacerbating factors.  He received aspirin and sublingual nitroglycerin from the medics during transport which provided no relief.  He has chronic lower extremity edema for which he takes a diuretic.  He denies acute worsening.  He had lightheadedness this morning and felt like he was going to faint.  His primary provider is [facility].  He has an appointment there today but was feeling too ill this morning to go.  He is not currently followed by a cardiologist.    Cardiology consulted for CP.    Presents the emergency room with a pounding type chest discomfort.  This is somewhat reproducible my examination.  Found to be in atrial flutter with 4-1 ventricular response.  He has a history of atrial flutter, but is not on anticoagulation.  Otherwise appears comfortable.  His troponin is minimally elevated at 0.050 in the setting of CKD 4.  Pros and cons of MARIA guided cardioversion were discussed with the patient and he agrees to proceed.  Permit has been signed.

## 2023-01-05 NOTE — ASSESSMENT & PLAN NOTE
Seems to be presenting issue  Pt has hx of AFL in the past, not on OAC at present  Start eliquis 5mg bid  MARIA/DCCV today  Consider EPS eval as outpatient for ablation    Risks, benefits and alternatives of the MARIA/Cardioversion procedure were discussed with the patient including throat irritation, aspiration, anesthetic complications, esophageal irritation/perforation, skin irritation, arrhythmia, etc.  Patient understands and agrees to proceed.  Permits signed.

## 2023-01-05 NOTE — PLAN OF CARE
01/05/23 1232   Discharge Planning   Assessment Type Discharge Planning Brief Assessment   Resource/Environmental Concerns none   Support Systems Family members   Equipment Currently Used at Home glucometer;rollator;cane, straight   Current Living Arrangements home   Patient/Family Anticipates Transition to home   Patient/Family Anticipated Services at Transition none   DME Needed Upon Discharge  other (see comments)  (TBD)   Discharge Plan A Home  (with follow up appointment)       [id number] - [place], [place] - [address] [facility] [address] [place]  [address]  [facility] [address]  Phone: [phone] Fax: [phone]    [facility] [address] [place] [address] [address] [place] [address]  Phone: [phone] Fax: [phone]    [facility] [address] [address]  Phone: [phone] Fax: [phone]

## 2023-01-05 NOTE — ASSESSMENT & PLAN NOTE
Cr today of 2.5 baseline of 1.9 to 2.0  Renal dose medications, avoid nephrotoxic drugs monitor intake and output  Not on ACE/ARB outpt.   Will check UA

## 2023-01-05 NOTE — ASSESSMENT & PLAN NOTE
Previous echo with EF of 55% and indeterminate diastolic dysfunction. Appears more euvolemic on exam after IV lasix in the ED. Now on RA. BNP elevated at 804. Chest x-ray review with pulmonary edema  Repeat echo  Resume home lasix  Daily weights/strict intake and output/telemetry  Echo    Interpretation Summary  · The estimated ejection fraction is 55%.  · The left ventricle is normal in size with normal systolic function.  · Indeterminate left ventricular diastolic function.  · Mild aortic regurgitation.  · Mild to moderate tricuspid regurgitation.  · Normal right ventricular size with normal right ventricular systolic function.  · There is moderate aortic valve stenosis.  · Aortic valve area is 1.30 cm2; peak velocity is 2.90 m/s; mean gradient is 22 mmHg.  · Moderate left atrial enlargement.  · Mild right atrial enlargement.  · Mild mitral regurgitation.  · The estimated PA systolic pressure is 53 mmHg.  · Intermediate central venous pressure (8 mmHg).  · There is pulmonary hypertension.  Recent Labs   Lab 01/05/23  1032   *   .

## 2023-01-05 NOTE — PROGRESS NOTES
Pharmacist Renal Dose Adjustment Note    Chato Bowie is a 72 y.o. male being treated with the medication famotidine    Patient Data:    Vital Signs (Most Recent):  Temp: 98 °F (36.7 °C) (01/05/23 0919)  Pulse: (!) 52 (01/05/23 1029)  Resp: 20 (01/05/23 0919)  BP: (!) 165/72 (01/05/23 1029)  SpO2: 97 % (01/05/23 0959)   Vital Signs (72h Range):  Temp:  [98 °F (36.7 °C)]   Pulse:  [49-52]   Resp:  [20]   BP: (165-198)/(72-88)   SpO2:  [97 %]      Recent Labs   Lab 01/05/23  1032   CREATININE 2.5*     Serum creatinine: 2.5 mg/dL (H) 01/05/23 1032  Estimated creatinine clearance: 32.2 mL/min (A)    Medication:famotidine dose: 40 mg  frequency daily  will be changed to medication:famotidine dose:20 mg frequency:daily    Pharmacist's Name: Velia Tirado  Pharmacist's Extension: [phone]

## 2023-01-05 NOTE — BRIEF OP NOTE
[facility] - Cath Lab  Brief Operative Note     SUMMARY     Surgery Date: 1/5/2023     Surgeon(s) and Role:     * Inrda Foote MD - Primary    Assisting Surgeon: None    Pre-op Diagnosis:  Typical atrial flutter [I48.3]    Post-op Diagnosis:  Post-Op Diagnosis Codes:     * Typical atrial flutter [I48.3]    Procedure(s) (LRB):  Transesophageal echo (MARIA) intra-procedure log documentation (N/A)  Cardioversion or Defibrillation (N/A)    Anesthesia: Monitor Anesthesia Care    Description of the findings of the procedure: uneventful MARIA/DCCV with anesthesia    Findings/Key Components:   Normal biventricular size/fxn.  LVEF 65%, normal wall motion  Biatrial enlargement  No LA/VANESA thrombus  Mod AS, BASIL 1.2cm2 by planimetry  Mild-mod AI  Mild-mod TR  Mild MR    Successful DCCV AFL->SR/Mobitz I 70 BPM 75J x1    Plan:  Cont amio PO load  Cont eliquis 5mg bid  Dispo panning appropriate  Pt to follow up with Dr. Foote after discharge    Estimated Blood Loss: non         Specimens: None    Diet: cardiac    Activity: ad judy.

## 2023-01-05 NOTE — ASSESSMENT & PLAN NOTE
Patient's FSGs are controlled on current medication regimen.  Last A1c reviewed-   Lab Results   Component Value Date    HGBA1C 6.9 (H) 05/12/2022     Most recent fingerstick glucose reviewed- No results for input(s): POCTGLUCOSE in the last 24 hours.  Current correctional scale  Low  Maintain anti-hyperglycemic dose as follows-   Antihyperglycemics (From admission, onward)    Start     Stop Route Frequency Ordered    01/05/23 1440  insulin aspart U-100 pen 0-5 Units         -- SubQ Before meals & nightly PRN 01/05/23 1340        Hold Oral hypoglycemics while patient is in the hospital.

## 2023-01-05 NOTE — HPI
Chato Bowie 72 y.o. male with atrial flutter, HFpEF, CKD, HTN, DM, presents to the hospital with a chief complaint of chest pain.  He reports today he woke to experience a pounding left-sided and right-sided chest pain has improved with treatment in the ER and worsens with palpation of his chest.  He denies any attempted treatment at home.  This was associated with shortness of breath that worsens with lying flat and is improved with Lasix in the ER.  He reports he is compliant with his low-salt diet and home diuretic.  The Chest pain did not radiate.  He denies fever nausea vomiting abdominal pain syncope dizziness dysuria melena hematuria hematemesis.    In the ED, EKG of atrial flutter chest x-ray pulmonary edema  troponin 0.05 creatinine 2.5 afebrile without leukocytosis potassium 3.2.

## 2023-01-05 NOTE — CONSULTS
[facility] - Emergency Dept  Cardiology  Consult Note    Patient Name: Chato Bowie  MRN: [id number]  Admission Date: 1/5/2023  Hospital Length of Stay: 0 days  Code Status: Full Code   Attending Provider: Volodymyr Hunt MD   Consulting Provider: Indra Foote MD  Primary Care Physician: [facility] Valenzuela  Principal Problem:Typical atrial flutter    Patient information was obtained from patient and ER records.     Inpatient consult to Cardiology  Consult performed by: Indra Foote MD  Consult ordered by: Emanuel Soliz PA-C  Reason for consult: AFL/CP/trop        Subjective:     Chief Complaint:  CP     HPI:   72-year-old with the below past medical history who presents by ambulance.  He complains of chest pain that began last night while he was lying in bed.  The pain is located across both sides of his upper chest.  He describes the pain as pressure-like and pounding.  It has worsened since onset.  It is currently severe.  He has associated shortness of breath.  He was unable to sleep because of this pain.  There are no exacerbating factors.  He received aspirin and sublingual nitroglycerin from the medics during transport which provided no relief.  He has chronic lower extremity edema for which he takes a diuretic.  He denies acute worsening.  He had lightheadedness this morning and felt like he was going to faint.  His primary provider is [facility].  He has an appointment there today but was feeling too ill this morning to go.  He is not currently followed by a cardiologist.    Cardiology consulted for CP.    Presents the emergency room with a pounding type chest discomfort.  This is somewhat reproducible my examination.  Found to be in atrial flutter with 4-1 ventricular response.  He has a history of atrial flutter, but is not on anticoagulation.  Otherwise appears comfortable.  His troponin is minimally elevated at 0.050 in the setting of CKD 4.  Pros and cons of MARIA guided cardioversion were  discussed with the patient and he agrees to proceed.  Permit has been signed.      Past Medical History:   Diagnosis Date    Acute congestive heart failure 3/20/2020    Alcohol abuse     Arthritis     Asthma attack 11/24/2021    Coronary artery disease     Diabetes mellitus     Hyperlipemia     Hypertension     Rhabdomyolysis        Past Surgical History:   Procedure Laterality Date    EYE SURGERY      TREATMENT OF CARDIAC ARRHYTHMIA N/A 12/7/2020    Procedure: Cardioversion or Defibrillation;  Surgeon: Indra Foote MD;  Location: [facility] CATH LAB;  Service: Cardiology;  Laterality: N/A;    TREATMENT OF CARDIAC ARRHYTHMIA N/A 12/30/2020    Procedure: Cardioversion or Defibrillation;  Surgeon: Tyrone Steen MD;  Location: [facility] CATH LAB;  Service: Cardiology;  Laterality: N/A;    VASCULAR SURGERY         Review of patient's allergies indicates:  No Known Allergies    No current facility-administered medications on file prior to encounter.     Current Outpatient Medications on File Prior to Encounter   Medication Sig    furosemide (LASIX) 80 MG tablet Take 1 tablet (80 mg total) by mouth 2 (two) times daily.    metoprolol succinate (TOPROL-XL) 50 MG 24 hr tablet Take 1 tablet (50 mg total) by mouth once daily.    tamsulosin (FLOMAX) 0.4 mg Cap Take 1 capsule (0.4 mg total) by mouth once daily.    albuterol (PROVENTIL/VENTOLIN HFA) 90 mcg/actuation inhaler Inhale 2 puffs into the lungs every 4 (four) hours as needed.    amiodarone (PACERONE) 200 MG Tab Take 1 tablet (200 mg total) by mouth once daily.    aspirin 81 MG Chew Take 1 tablet (81 mg total) by mouth once daily.    DUREZOL 0.05 % Drop ophthalmic solution INT 1 GTT INTO OD FID FOR 3 WEEKS    famotidine (PEPCID) 40 MG tablet Take 40 mg by mouth once daily.    hydrALAZINE (APRESOLINE) 25 MG tablet Take 2 tablets (50 mg total) by mouth every 8 (eight) hours.    insulin aspart U-100 (NOVOLOG) 100 unit/mL (3 mL) InPn pen Inject 5 Units into the skin 3 (three)  "times daily.    magnesium oxide (MAG-OX) 400 mg (241.3 mg magnesium) tablet Take 800 mg by mouth.    metOLazone (ZAROXOLYN) 5 MG tablet TAKE 1 TABLET BY MOUTH 3 TIMES A WEEK AS NEEDED FOR SEVERE LEG SWELLING    multivitamin Tab Take 1 tablet by mouth once daily.    potassium chloride SA (K-DUR,KLOR-CON) 20 MEQ tablet Take 2 tablets by mouth once daily.    rosuvastatin (CRESTOR) 20 MG tablet Take 20 mg by mouth once daily.    sildenafiL (VIAGRA) 100 MG tablet TAKE 1 TABLET BY MOUTH ONCE DAILY AS NEEDED FOR ERECTILE DYSFUNCTION.     Family History       Problem Relation (Age of Onset)    Diabetes Mother, Father, Sister    Hypertension Mother, Father, Sister          Tobacco Use    Smoking status: Never    Smokeless tobacco: Never   Substance and Sexual Activity    Alcohol use: Not Currently     Alcohol/week: 4.0 standard drinks     Types: 4 Cans of beer per week    Drug use: No    Sexual activity: Yes     Partners: Female     Review of Systems   Constitutional: Negative for chills, diaphoresis, fever and malaise/fatigue.   HENT:  Negative for nosebleeds.    Eyes:  Negative for blurred vision and double vision.   Cardiovascular:  Positive for chest pain ("pounding", reproducible). Negative for claudication, cyanosis, dyspnea on exertion, leg swelling, orthopnea, palpitations, paroxysmal nocturnal dyspnea and syncope.   Respiratory:  Positive for shortness of breath. Negative for cough and wheezing.    Skin:  Negative for dry skin and poor wound healing.   Musculoskeletal:  Negative for back pain, joint swelling and myalgias.   Gastrointestinal:  Negative for abdominal pain, nausea and vomiting.   Genitourinary:  Negative for hematuria.   Neurological:  Negative for dizziness, headaches, numbness, seizures and weakness.   Psychiatric/Behavioral:  Negative for altered mental status and depression.    Objective:     Vital Signs (Most Recent):  Temp: 98 °F (36.7 °C) (01/05/23 0919)  Pulse: (!) 52 (01/05/23 1029)  Resp: 20 " (01/05/23 0919)  BP: (!) 165/72 (01/05/23 1029)  SpO2: 97 % (01/05/23 0959)   Vital Signs (24h Range):  Temp:  [98 °F (36.7 °C)] 98 °F (36.7 °C)  Pulse:  [49-52] 52  Resp:  [20] 20  SpO2:  [97 %] 97 %  BP: (165-198)/(72-88) 165/72     Weight: 100.2 kg (221 lb)  Body mass index is 30.82 kg/m².    SpO2: 97 %       No intake or output data in the 24 hours ending 01/05/23 1256    Lines/Drains/Airways       Peripheral Intravenous Line  Duration                  Peripheral IV - Single Lumen 01/05/23 0900 22 G Left;Posterior Hand <1 day         Peripheral IV - Single Lumen 01/05/23 1035 20 G Left Antecubital <1 day                    Physical Exam  Constitutional:       General: He is not in acute distress.     Appearance: He is well-developed. He is not ill-appearing, toxic-appearing or diaphoretic.   HENT:      Head: Normocephalic and atraumatic.   Eyes:      General: No scleral icterus.     Extraocular Movements: Extraocular movements intact.      Conjunctiva/sclera: Conjunctivae normal.      Pupils: Pupils are equal, round, and reactive to light.   Neck:      Thyroid: No thyromegaly.      Vascular: No JVD.      Trachea: No tracheal deviation.   Cardiovascular:      Rate and Rhythm: Regular rhythm. Bradycardia present.      Heart sounds: S1 normal and S2 normal. No murmur heard.    No friction rub. No gallop.   Pulmonary:      Effort: Pulmonary effort is normal. No respiratory distress.      Breath sounds: Normal breath sounds. No stridor. No wheezing, rhonchi or rales.   Chest:      Chest wall: Tenderness present.   Abdominal:      General: There is no distension.      Palpations: Abdomen is soft.   Musculoskeletal:         General: No swelling or tenderness. Normal range of motion.      Cervical back: Normal range of motion and neck supple. No rigidity.      Right lower leg: No edema.      Left lower leg: No edema.   Skin:     General: Skin is warm and dry.      Coloration: Skin is not jaundiced.      Findings: No  rash.   Neurological:      General: No focal deficit present.      Mental Status: He is alert and oriented to person, place, and time.      Cranial Nerves: No cranial nerve deficit.   Psychiatric:         Mood and Affect: Mood normal.         Behavior: Behavior normal.       Current Medications:   atorvastatin  80 mg Oral Daily    enoxaparin  40 mg Subcutaneous Daily    famotidine  20 mg Oral Daily    [START ON 1/6/2023] furosemide (LASIX) injection  80 mg Intravenous Daily    hydrALAZINE  10 mg Intravenous ED 1 Time    metoprolol succinate  50 mg Oral Daily    nitroGLYCERIN 2% TD oint  0.5 inch Topical (Top) Q6H    [START ON 1/6/2023] polyethylene glycol  17 g Oral Daily    potassium chloride  40 mEq Oral Once    [START ON 1/6/2023] senna-docusate 8.6-50 mg  1 tablet Oral BID    tamsulosin  0.4 mg Oral Daily       nitroGLYCERIN, ondansetron, sodium chloride 0.9%    Laboratory (all labs reviewed):  CBC:  Recent Labs   Lab 12/09/21  1747 12/10/21  0627 02/05/22  0217 05/12/22  0507 01/05/23  1032   WBC 7.35 6.50 8.86 8.41 5.61   Hemoglobin 10.5 L 11.0 L 12.2 L 11.6 L 13.5 L   Hematocrit 32.1 L 34.0 L 37.3 L 35.7 L 38.9 L   Platelets 334 346 266 314 218       CHEMISTRIES:  Recent Labs   Lab 09/05/21  0340 09/06/21  0601 11/24/21  0208 11/25/21  0341 12/10/21  0627 12/11/21  0515 02/05/22  0217 02/05/22  1116 02/09/22  0442 02/10/22  0630 05/12/22  0507 05/12/22  0836 05/13/22  0446 05/14/22  0525 01/05/23  1032   Glucose 204 H   < > 139 H   < > 107   < > 110   < > 151 H 152 H 190 H  --  172 H 153 H 154 H   Sodium 141   < > 137   < > 139   < > 142   < > 143 142 135 L  --  136 140 135 L   Potassium 3.7   < > 3.2 L   < > 4.8   < > 2.9 L   < > 3.7 3.7 3.6  --  4.0 3.0 L 3.2 L   BUN 21   < > 31 H   < > 65 H   < > 35 H   < > 46 H 42 H 35 H  --  45 H 51 H 64 H   Creatinine 1.8 H   < > 2.3 H   < > 3.9 H   < > 1.8 H   < > 2.2 H 1.9 H 2.0 H  --  1.9 H 1.9 H 2.5 H   eGFR if non  37 A   < > 28 A   < > 15 A   < >  37 A   < > 29 A 35 A 33 A  --  35 A 35 A  --    eGFR  --   --   --   --   --   --   --   --   --   --   --   --   --   --  27 A   Calcium 9.4   < > 10.2   < > 9.1   < > 9.6   < > 9.7 10.2 9.7  --  9.7 9.7 9.2   Magnesium 1.6  --  1.9  --  2.5  --  2.0  --   --   --   --  1.9  --   --   --     < > = values in this interval not displayed.       CARDIAC BIOMARKERS:  Recent Labs   Lab 11/24/20 2001 11/24/20  2157 12/10/21  0059 12/10/21  0627 02/05/22  0217 05/12/22  0507 01/05/23  1032    H  --   --   --   --   --   --    Troponin I 0.060 H   < > 0.020 0.011 0.017 0.018 0.050 H    < > = values in this interval not displayed.       COAGS:  Recent Labs   Lab 03/20/20  0014 11/24/20  1519 12/29/20  0320 03/23/21  0434 09/05/21  0525   INR 1.0 1.0 1.0 1.1 1.1       LIPIDS/LFTS:  Recent Labs   Lab 11/24/20  1519 11/25/20  0433 12/29/20  0320 03/22/21  1837 05/23/21  2223 06/04/21  2115 12/09/21  1747 12/12/21  0527 02/05/22  0217 02/08/22  0639 05/12/22  0507 01/05/23  1032   Cholesterol 98 L  --  141  --  123  --   --   --   --  96 L  --   --    Triglycerides 234 H  --  123  --  224 H  --   --   --   --  70  --   --    HDL 36 L  --  27 L  --  31 L  --   --   --   --  27 L  --   --    LDL Cholesterol 15.2 L  --  89.4  --  47.2 L  --   --   --   --  55.0 L  --   --    Non-HDL Cholesterol 62  --  114  --  92  --   --   --   --  69  --   --    AST 47 H   < > 24   < > 15   < > 15 10 17  --  20 23   ALT 28   < > 19   < > 12   < > 16 11 8 L  --  17 24    < > = values in this interval not displayed.       BNP:  Recent Labs   Lab 11/23/21  2208 12/09/21  1747 02/05/22  0217 05/12/22  0507 01/05/23  1032    H 525 H 659 H 424 H 804 H       TSH:  Recent Labs   Lab 11/24/20  1519 12/29/20  0320 03/23/21  0434 05/23/21  2223 09/05/21  0340   TSH 1.539 2.627 5.727 H 4.969 H 0.889       Free T4:  Recent Labs   Lab 03/23/21  0434 05/23/21  2223   Free T4 1.17 1.25       Diagnostic Results:  ECG (personally reviewed and  interpreted tracing(s)):  1/5/23 0924 AFL 58, LAD/IVCD    Chest X-Ray (personally reviewed and interpreted image(s)): 1/5/23 mild CHF    Echo: 2/7/22 (MARIA/DCCV planned)  The estimated ejection fraction is 55%.  The left ventricle is normal in size with normal systolic function.  Indeterminate left ventricular diastolic function.  Mild aortic regurgitation.  Mild to moderate tricuspid regurgitation.  Normal right ventricular size with normal right ventricular systolic function.  There is moderate aortic valve stenosis.  Aortic valve area is 1.30 cm2; peak velocity is 2.90 m/s; mean gradient is 22 mmHg.  Moderate left atrial enlargement.  Mild right atrial enlargement.  Mild mitral regurgitation.  The estimated PA systolic pressure is 53 mmHg.  Intermediate central venous pressure (8 mmHg).  There is pulmonary hypertension.    L MPI 11/25/20    Normal myocardial perfusion scan. There is no evidence of myocardial ischemia or infarction.    There is a  moderate intensity fixed defect in the inferior wall of the left ventricle secondary to diaphragm attenuation.    Gated perfusion images showed an ejection fraction of 48% post stress.    There is normal wall motion post stress.    The EKG portion of this study is abnormal but not diagnostic.    There were no arrhythmias during stress.      Assessment and Plan:     * Typical atrial flutter  Seems to be presenting issue  Pt has hx of AFL in the past, not on OAC at present  Start eliquis 5mg bid  Cont amio, will reload po  MARIA/DCCV today  Consider EPS eval as outpatient for ablation    Risks, benefits and alternatives of the MARIA/Cardioversion procedure were discussed with the patient including throat irritation, aspiration, anesthetic complications, esophageal irritation/perforation, skin irritation, arrhythmia, etc.  Patient understands and agrees to proceed.  Permits signed.      Acute on chronic diastolic (congestive) heart failure  Pt does not appear grossly vol  overloaded  Cont med rx    Chest pain  Atypical/reproducible  Minimal trop elev in setting of CKD4, doubt ACS  Consider outpat MPI    Paroxysmal atrial flutter  As above    Aortic stenosis, moderate  Notedon prev echo    Hyperlipidemia  Cont statin    Essential hypertension  Cont med rx    Type 2 diabetes mellitus with kidney complication, with long-term current use of insulin  Per IM        VTE Risk Mitigation (From admission, onward)           Ordered     apixaban tablet 5 mg  2 times daily         01/05/23 1316     IP VTE HIGH RISK PATIENT  Once         01/05/23 1205     Place sequential compression device  Until discontinued         01/05/23 1205                    Thank you for your consult. I will follow-up with patient. Please contact us if you have any additional questions.    Indra Foote MD  Cardiology   [facility] - Emergency Dept

## 2023-01-05 NOTE — ASSESSMENT & PLAN NOTE
Previous history of AFL, presents today in AFL possibly cause of volume overload. Not currently on OAC  Started on eliquis  Reloaded with amiodarone continue metoprolol  Cardiology consulted  Echo  Telemetry  TSH/Mg pending

## 2023-01-05 NOTE — ED TRIAGE NOTES
"Pt reported to ED via EMS with c/o of non radiating mid sternal chest pain that started last night at 7pm and worsened this morning. PT states " I just don't feel right."Per EMS received 325 aspirin and 3 nitro without relief. HX of CHF, DM, asthma, stent placement. AAOx4, SBP elevated all other vitals stable at this time.   "

## 2023-01-05 NOTE — SUBJECTIVE & OBJECTIVE
Past Medical History:   Diagnosis Date    Acute congestive heart failure 3/20/2020    Alcohol abuse     Arthritis     Asthma attack 11/24/2021    Coronary artery disease     Diabetes mellitus     Hyperlipemia     Hypertension     Rhabdomyolysis        Past Surgical History:   Procedure Laterality Date    EYE SURGERY      TREATMENT OF CARDIAC ARRHYTHMIA N/A 12/7/2020    Procedure: Cardioversion or Defibrillation;  Surgeon: Indra Foote MD;  Location: [facility] CATH LAB;  Service: Cardiology;  Laterality: N/A;    TREATMENT OF CARDIAC ARRHYTHMIA N/A 12/30/2020    Procedure: Cardioversion or Defibrillation;  Surgeon: Tyrone Steen MD;  Location: [facility] CATH LAB;  Service: Cardiology;  Laterality: N/A;    VASCULAR SURGERY         Review of patient's allergies indicates:  No Known Allergies    No current facility-administered medications on file prior to encounter.     Current Outpatient Medications on File Prior to Encounter   Medication Sig    furosemide (LASIX) 80 MG tablet Take 1 tablet (80 mg total) by mouth 2 (two) times daily.    metoprolol succinate (TOPROL-XL) 50 MG 24 hr tablet Take 1 tablet (50 mg total) by mouth once daily.    tamsulosin (FLOMAX) 0.4 mg Cap Take 1 capsule (0.4 mg total) by mouth once daily.    albuterol (PROVENTIL/VENTOLIN HFA) 90 mcg/actuation inhaler Inhale 2 puffs into the lungs every 4 (four) hours as needed.    amiodarone (PACERONE) 200 MG Tab Take 1 tablet (200 mg total) by mouth once daily.    aspirin 81 MG Chew Take 1 tablet (81 mg total) by mouth once daily.    DUREZOL 0.05 % Drop ophthalmic solution INT 1 GTT INTO OD FID FOR 3 WEEKS    famotidine (PEPCID) 40 MG tablet Take 40 mg by mouth once daily.    hydrALAZINE (APRESOLINE) 25 MG tablet Take 2 tablets (50 mg total) by mouth every 8 (eight) hours.    insulin aspart U-100 (NOVOLOG) 100 unit/mL (3 mL) InPn pen Inject 5 Units into the skin 3 (three) times daily.    magnesium oxide (MAG-OX) 400 mg (241.3 mg magnesium) tablet Take  "800 mg by mouth.    metOLazone (ZAROXOLYN) 5 MG tablet TAKE 1 TABLET BY MOUTH 3 TIMES A WEEK AS NEEDED FOR SEVERE LEG SWELLING    multivitamin Tab Take 1 tablet by mouth once daily.    potassium chloride SA (K-DUR,KLOR-CON) 20 MEQ tablet Take 2 tablets by mouth once daily.    rosuvastatin (CRESTOR) 20 MG tablet Take 20 mg by mouth once daily.    sildenafiL (VIAGRA) 100 MG tablet TAKE 1 TABLET BY MOUTH ONCE DAILY AS NEEDED FOR ERECTILE DYSFUNCTION.     Family History       Problem Relation (Age of Onset)    Diabetes Mother, Father, Sister    Hypertension Mother, Father, Sister          Tobacco Use    Smoking status: Never    Smokeless tobacco: Never   Substance and Sexual Activity    Alcohol use: Not Currently     Alcohol/week: 4.0 standard drinks     Types: 4 Cans of beer per week    Drug use: No    Sexual activity: Yes     Partners: Female     Review of Systems   Constitutional: Negative for chills, diaphoresis, fever and malaise/fatigue.   HENT:  Negative for nosebleeds.    Eyes:  Negative for blurred vision and double vision.   Cardiovascular:  Positive for chest pain ("pounding", reproducible). Negative for claudication, cyanosis, dyspnea on exertion, leg swelling, orthopnea, palpitations, paroxysmal nocturnal dyspnea and syncope.   Respiratory:  Positive for shortness of breath. Negative for cough and wheezing.    Skin:  Negative for dry skin and poor wound healing.   Musculoskeletal:  Negative for back pain, joint swelling and myalgias.   Gastrointestinal:  Negative for abdominal pain, nausea and vomiting.   Genitourinary:  Negative for hematuria.   Neurological:  Negative for dizziness, headaches, numbness, seizures and weakness.   Psychiatric/Behavioral:  Negative for altered mental status and depression.    Objective:     Vital Signs (Most Recent):  Temp: 98 °F (36.7 °C) (01/05/23 0919)  Pulse: (!) 52 (01/05/23 1029)  Resp: 20 (01/05/23 0919)  BP: (!) 165/72 (01/05/23 1029)  SpO2: 97 % (01/05/23 0959)   " Vital Signs (24h Range):  Temp:  [98 °F (36.7 °C)] 98 °F (36.7 °C)  Pulse:  [49-52] 52  Resp:  [20] 20  SpO2:  [97 %] 97 %  BP: (165-198)/(72-88) 165/72     Weight: 100.2 kg (221 lb)  Body mass index is 30.82 kg/m².    SpO2: 97 %       No intake or output data in the 24 hours ending 01/05/23 1256    Lines/Drains/Airways       Peripheral Intravenous Line  Duration                  Peripheral IV - Single Lumen 01/05/23 0900 22 G Left;Posterior Hand <1 day         Peripheral IV - Single Lumen 01/05/23 1035 20 G Left Antecubital <1 day                    Physical Exam  Constitutional:       General: He is not in acute distress.     Appearance: He is well-developed. He is not ill-appearing, toxic-appearing or diaphoretic.   HENT:      Head: Normocephalic and atraumatic.   Eyes:      General: No scleral icterus.     Extraocular Movements: Extraocular movements intact.      Conjunctiva/sclera: Conjunctivae normal.      Pupils: Pupils are equal, round, and reactive to light.   Neck:      Thyroid: No thyromegaly.      Vascular: No JVD.      Trachea: No tracheal deviation.   Cardiovascular:      Rate and Rhythm: Regular rhythm. Bradycardia present.      Heart sounds: S1 normal and S2 normal. No murmur heard.    No friction rub. No gallop.   Pulmonary:      Effort: Pulmonary effort is normal. No respiratory distress.      Breath sounds: Normal breath sounds. No stridor. No wheezing, rhonchi or rales.   Chest:      Chest wall: Tenderness present.   Abdominal:      General: There is no distension.      Palpations: Abdomen is soft.   Musculoskeletal:         General: No swelling or tenderness. Normal range of motion.      Cervical back: Normal range of motion and neck supple. No rigidity.      Right lower leg: No edema.      Left lower leg: No edema.   Skin:     General: Skin is warm and dry.      Coloration: Skin is not jaundiced.      Findings: No rash.   Neurological:      General: No focal deficit present.      Mental Status:  He is alert and oriented to person, place, and time.      Cranial Nerves: No cranial nerve deficit.   Psychiatric:         Mood and Affect: Mood normal.         Behavior: Behavior normal.       Current Medications:   atorvastatin  80 mg Oral Daily    enoxaparin  40 mg Subcutaneous Daily    famotidine  20 mg Oral Daily    [START ON 1/6/2023] furosemide (LASIX) injection  80 mg Intravenous Daily    hydrALAZINE  10 mg Intravenous ED 1 Time    metoprolol succinate  50 mg Oral Daily    nitroGLYCERIN 2% TD oint  0.5 inch Topical (Top) Q6H    [START ON 1/6/2023] polyethylene glycol  17 g Oral Daily    potassium chloride  40 mEq Oral Once    [START ON 1/6/2023] senna-docusate 8.6-50 mg  1 tablet Oral BID    tamsulosin  0.4 mg Oral Daily       nitroGLYCERIN, ondansetron, sodium chloride 0.9%    Laboratory (all labs reviewed):  CBC:  Recent Labs   Lab 12/09/21  1747 12/10/21  0627 02/05/22  0217 05/12/22  0507 01/05/23  1032   WBC 7.35 6.50 8.86 8.41 5.61   Hemoglobin 10.5 L 11.0 L 12.2 L 11.6 L 13.5 L   Hematocrit 32.1 L 34.0 L 37.3 L 35.7 L 38.9 L   Platelets 334 346 266 314 218       CHEMISTRIES:  Recent Labs   Lab 09/05/21  0340 09/06/21  0601 11/24/21  0208 11/25/21  0341 12/10/21  0627 12/11/21  0515 02/05/22  0217 02/05/22  1116 02/09/22  0442 02/10/22  0630 05/12/22  0507 05/12/22  0836 05/13/22  0446 05/14/22  0525 01/05/23  1032   Glucose 204 H   < > 139 H   < > 107   < > 110   < > 151 H 152 H 190 H  --  172 H 153 H 154 H   Sodium 141   < > 137   < > 139   < > 142   < > 143 142 135 L  --  136 140 135 L   Potassium 3.7   < > 3.2 L   < > 4.8   < > 2.9 L   < > 3.7 3.7 3.6  --  4.0 3.0 L 3.2 L   BUN 21   < > 31 H   < > 65 H   < > 35 H   < > 46 H 42 H 35 H  --  45 H 51 H 64 H   Creatinine 1.8 H   < > 2.3 H   < > 3.9 H   < > 1.8 H   < > 2.2 H 1.9 H 2.0 H  --  1.9 H 1.9 H 2.5 H   eGFR if non  37 A   < > 28 A   < > 15 A   < > 37 A   < > 29 A 35 A 33 A  --  35 A 35 A  --    eGFR  --   --   --   --   --   --    --   --   --   --   --   --   --   --  27 A   Calcium 9.4   < > 10.2   < > 9.1   < > 9.6   < > 9.7 10.2 9.7  --  9.7 9.7 9.2   Magnesium 1.6  --  1.9  --  2.5  --  2.0  --   --   --   --  1.9  --   --   --     < > = values in this interval not displayed.       CARDIAC BIOMARKERS:  Recent Labs   Lab 11/24/20 2001 11/24/20  2157 12/10/21  0059 12/10/21  0627 02/05/22  0217 05/12/22  0507 01/05/23  1032    H  --   --   --   --   --   --    Troponin I 0.060 H   < > 0.020 0.011 0.017 0.018 0.050 H    < > = values in this interval not displayed.       COAGS:  Recent Labs   Lab 03/20/20  0014 11/24/20  1519 12/29/20  0320 03/23/21  0434 09/05/21  0525   INR 1.0 1.0 1.0 1.1 1.1       LIPIDS/LFTS:  Recent Labs   Lab 11/24/20  1519 11/25/20  0433 12/29/20  0320 03/22/21  1837 05/23/21  2223 06/04/21  2115 12/09/21  1747 12/12/21  0527 02/05/22  0217 02/08/22  0639 05/12/22  0507 01/05/23  1032   Cholesterol 98 L  --  141  --  123  --   --   --   --  96 L  --   --    Triglycerides 234 H  --  123  --  224 H  --   --   --   --  70  --   --    HDL 36 L  --  27 L  --  31 L  --   --   --   --  27 L  --   --    LDL Cholesterol 15.2 L  --  89.4  --  47.2 L  --   --   --   --  55.0 L  --   --    Non-HDL Cholesterol 62  --  114  --  92  --   --   --   --  69  --   --    AST 47 H   < > 24   < > 15   < > 15 10 17  --  20 23   ALT 28   < > 19   < > 12   < > 16 11 8 L  --  17 24    < > = values in this interval not displayed.       BNP:  Recent Labs   Lab 11/23/21 2208 12/09/21  1747 02/05/22  0217 05/12/22  0507 01/05/23  1032    H 525 H 659 H 424 H 804 H       TSH:  Recent Labs   Lab 11/24/20  1519 12/29/20  0320 03/23/21  0434 05/23/21  2223 09/05/21  0340   TSH 1.539 2.627 5.727 H 4.969 H 0.889       Free T4:  Recent Labs   Lab 03/23/21  0434 05/23/21 2223   Free T4 1.17 1.25       Diagnostic Results:  ECG (personally reviewed and interpreted tracing(s)):  1/5/23 0924 AFL 58, LAD/IVCD    Chest X-Ray (personally  reviewed and interpreted image(s)): 1/5/23 mild CHF    Echo: 2/7/22 (MARIA/DCCV planned)  The estimated ejection fraction is 55%.  The left ventricle is normal in size with normal systolic function.  Indeterminate left ventricular diastolic function.  Mild aortic regurgitation.  Mild to moderate tricuspid regurgitation.  Normal right ventricular size with normal right ventricular systolic function.  There is moderate aortic valve stenosis.  Aortic valve area is 1.30 cm2; peak velocity is 2.90 m/s; mean gradient is 22 mmHg.  Moderate left atrial enlargement.  Mild right atrial enlargement.  Mild mitral regurgitation.  The estimated PA systolic pressure is 53 mmHg.  Intermediate central venous pressure (8 mmHg).  There is pulmonary hypertension.    L MPI 11/25/20    Normal myocardial perfusion scan. There is no evidence of myocardial ischemia or infarction.    There is a  moderate intensity fixed defect in the inferior wall of the left ventricle secondary to diaphragm attenuation.    Gated perfusion images showed an ejection fraction of 48% post stress.    There is normal wall motion post stress.    The EKG portion of this study is abnormal but not diagnostic.    There were no arrhythmias during stress.

## 2023-01-06 VITALS
HEIGHT: 71 IN | DIASTOLIC BLOOD PRESSURE: 81 MMHG | RESPIRATION RATE: 14 BRPM | BODY MASS INDEX: 30.8 KG/M2 | OXYGEN SATURATION: 96 % | TEMPERATURE: 98 F | SYSTOLIC BLOOD PRESSURE: 175 MMHG | HEART RATE: 72 BPM | WEIGHT: 220 LBS

## 2023-01-06 LAB
ANION GAP SERPL CALC-SCNC: 13 MMOL/L (ref 8–16)
BASOPHILS # BLD AUTO: 0.08 K/UL (ref 0–0.2)
BASOPHILS NFR BLD: 1.7 % (ref 0–1.9)
BUN SERPL-MCNC: 62 MG/DL (ref 8–23)
CALCIUM SERPL-MCNC: 9 MG/DL (ref 8.7–10.5)
CHLORIDE SERPL-SCNC: 100 MMOL/L (ref 95–110)
CO2 SERPL-SCNC: 28 MMOL/L (ref 23–29)
CREAT SERPL-MCNC: 2.2 MG/DL (ref 0.5–1.4)
DIFFERENTIAL METHOD: ABNORMAL
EOSINOPHIL # BLD AUTO: 0.2 K/UL (ref 0–0.5)
EOSINOPHIL NFR BLD: 3.5 % (ref 0–8)
ERYTHROCYTE [DISTWIDTH] IN BLOOD BY AUTOMATED COUNT: 14.2 % (ref 11.5–14.5)
EST. GFR  (NO RACE VARIABLE): 31 ML/MIN/1.73 M^2
ESTIMATED AVG GLUCOSE: 169 MG/DL (ref 68–131)
GLUCOSE SERPL-MCNC: 141 MG/DL (ref 70–110)
HBA1C MFR BLD: 7.5 % (ref 4–5.6)
HCT VFR BLD AUTO: 37 % (ref 40–54)
HGB BLD-MCNC: 12.2 G/DL (ref 14–18)
IMM GRANULOCYTES # BLD AUTO: 0.01 K/UL (ref 0–0.04)
IMM GRANULOCYTES NFR BLD AUTO: 0.2 % (ref 0–0.5)
LYMPHOCYTES # BLD AUTO: 0.7 K/UL (ref 1–4.8)
LYMPHOCYTES NFR BLD: 14.2 % (ref 18–48)
MCH RBC QN AUTO: 26.1 PG (ref 27–31)
MCHC RBC AUTO-ENTMCNC: 33 G/DL (ref 32–36)
MCV RBC AUTO: 79 FL (ref 82–98)
MONOCYTES # BLD AUTO: 0.8 K/UL (ref 0.3–1)
MONOCYTES NFR BLD: 17.3 % (ref 4–15)
NEUTROPHILS # BLD AUTO: 3 K/UL (ref 1.8–7.7)
NEUTROPHILS NFR BLD: 63.1 % (ref 38–73)
NRBC BLD-RTO: 0 /100 WBC
PLATELET # BLD AUTO: 206 K/UL (ref 150–450)
PMV BLD AUTO: 9.3 FL (ref 9.2–12.9)
POCT GLUCOSE: 137 MG/DL (ref 70–110)
POCT GLUCOSE: 199 MG/DL (ref 70–110)
POTASSIUM SERPL-SCNC: 2.8 MMOL/L (ref 3.5–5.1)
RBC # BLD AUTO: 4.67 M/UL (ref 4.6–6.2)
SODIUM SERPL-SCNC: 141 MMOL/L (ref 136–145)
WBC # BLD AUTO: 4.79 K/UL (ref 3.9–12.7)

## 2023-01-06 PROCEDURE — 25000003 PHARM REV CODE 250: Performed by: INTERNAL MEDICINE

## 2023-01-06 PROCEDURE — 36415 COLL VENOUS BLD VENIPUNCTURE: CPT | Performed by: INTERNAL MEDICINE

## 2023-01-06 PROCEDURE — G0378 HOSPITAL OBSERVATION PER HR: HCPCS

## 2023-01-06 PROCEDURE — 85025 COMPLETE CBC W/AUTO DIFF WBC: CPT | Performed by: INTERNAL MEDICINE

## 2023-01-06 PROCEDURE — 25000003 PHARM REV CODE 250: Performed by: HOSPITALIST

## 2023-01-06 PROCEDURE — 80048 BASIC METABOLIC PNL TOTAL CA: CPT | Performed by: INTERNAL MEDICINE

## 2023-01-06 RX ORDER — POTASSIUM CHLORIDE 20 MEQ/1
40 TABLET, EXTENDED RELEASE ORAL EVERY 4 HOURS
Status: COMPLETED | OUTPATIENT
Start: 2023-01-06 | End: 2023-01-06

## 2023-01-06 RX ORDER — AMIODARONE HYDROCHLORIDE 200 MG/1
TABLET ORAL
Qty: 132 TABLET | Refills: 0 | Status: ON HOLD | OUTPATIENT
Start: 2023-01-06 | End: 2023-06-06 | Stop reason: HOSPADM

## 2023-01-06 RX ADMIN — ATORVASTATIN CALCIUM 80 MG: 40 TABLET, FILM COATED ORAL at 08:01

## 2023-01-06 RX ADMIN — HYDRALAZINE HYDROCHLORIDE 50 MG: 25 TABLET, FILM COATED ORAL at 05:01

## 2023-01-06 RX ADMIN — TAMSULOSIN HYDROCHLORIDE 0.4 MG: 0.4 CAPSULE ORAL at 08:01

## 2023-01-06 RX ADMIN — AMIODARONE HYDROCHLORIDE 400 MG: 200 TABLET ORAL at 08:01

## 2023-01-06 RX ADMIN — APIXABAN 5 MG: 5 TABLET, FILM COATED ORAL at 08:01

## 2023-01-06 RX ADMIN — POTASSIUM CHLORIDE 40 MEQ: 1500 TABLET, EXTENDED RELEASE ORAL at 01:01

## 2023-01-06 RX ADMIN — METOPROLOL SUCCINATE 50 MG: 50 TABLET, EXTENDED RELEASE ORAL at 08:01

## 2023-01-06 RX ADMIN — POTASSIUM CHLORIDE 40 MEQ: 1500 TABLET, EXTENDED RELEASE ORAL at 09:01

## 2023-01-06 RX ADMIN — HYDRALAZINE HYDROCHLORIDE 50 MG: 25 TABLET, FILM COATED ORAL at 01:01

## 2023-01-06 NOTE — DISCHARGE SUMMARY
[facility] Medicine  Discharge Summary      Patient Name: Chato Bowie  MRN: [id number]  [facility]: [id number]  Patient Class: OP- Observation  Admission Date: 1/5/2023  Hospital Length of Stay: 0 days  Discharge Date and Time:  01/06/2023 11:43 AM  Attending Physician: Wen Polk MD   Discharging Provider: Wen Polk MD  Primary Care Provider: [facility]    Primary Care Team: Networked reference to record PCT     HPI:   Chato Bowie 72 y.o. male with atrial flutter, HFpEF, CKD, HTN, DM, presents to the hospital with a chief complaint of chest pain.  He reports today he woke to experience a pounding left-sided and right-sided chest pain has improved with treatment in the ER and worsens with palpation of his chest.  He denies any attempted treatment at home.  This was associated with shortness of breath that worsens with lying flat and is improved with Lasix in the ER.  He reports he is compliant with his low-salt diet and home diuretic.  The Chest pain did not radiate.  He denies fever nausea vomiting abdominal pain syncope dizziness dysuria melena hematuria hematemesis.    In the ED, EKG of atrial flutter chest x-ray pulmonary edema  troponin 0.05 creatinine 2.5 afebrile without leukocytosis potassium 3.2.      Procedure(s) (LRB):  Transesophageal echo (MARIA) intra-procedure log documentation (N/A)  Cardioversion or Defibrillation (N/A)      Hospital Course:   Mr Chato Bowie was placed in observation for atrial flutter. Cardioverted on 1/5/23 and now NSR. On PO amiodarone and eliquis. Given IV lasix for shortness of breath that is now resolved. Stable on room air. Electrolytes supplemented. Stable for discharge to home with Cardiology follow up.        Goals of Care Treatment Preferences:  Code Status: Full Code      Consults:   Consults (From admission, onward)        Status Ordering Provider     Inpatient consult to Social Work  Once        Provider:  (Not yet  assigned)    INDRA Nobles     Inpatient consult to Cardiology  Once        Provider:  MD Tommy Clayton DANIEL     Inpatient consult to Social Work/Case Management  Once        Provider:  (Not yet assigned)    JERRY Williamson     Inpatient consult to Registered Dietitian/Nutritionist  Once        Provider:  (Not yet assigned)    JERRY Williamson          CKD (chronic kidney disease), stage III  Cr today of 2.5 baseline of 1.9 to 2.0  Renal dose medications, avoid nephrotoxic drugs monitor intake and output  Not on ACE/ARB outpt.   Will check UA      Final Active Diagnoses:    Diagnosis Date Noted POA    PRINCIPAL PROBLEM:  Typical atrial flutter [I48.3] 03/07/2016 Yes    Acute on chronic diastolic (congestive) heart failure [I50.33] 09/05/2021 Yes     Chronic    Paroxysmal atrial flutter [I48.92] 12/07/2020 Yes    Chest pain [R07.9] 12/05/2020 Yes    Aortic stenosis, moderate [I35.0] 11/24/2020 Yes    CKD (chronic kidney disease), stage III [N18.30] 11/24/2020 Yes    Hypokalemia [E87.6] 02/14/2019 Yes    Essential hypertension [I10] 03/10/2017 Yes     Chronic    Hyperlipidemia [E78.5] 03/10/2017 Yes     Chronic    Type 2 diabetes mellitus with kidney complication, with long-term current use of insulin [E11.29, Z79.4] 03/10/2017 Not Applicable      Problems Resolved During this Admission:       Discharged Condition: good    Disposition: Home or Self Care    Follow Up:   Follow-up Information     Indra Foote MD Follow up on 2/6/2023.    Specialties: Cardiology, Interventional Cardiology  Why: at 2:40pm  Contact information:  [address]  [address]  [place] [address]  [phone]             [facility] Follow up on 1/10/2023.    Why: @2:30pm for a hospital follow up  Contact information:  [address] [facility]  [place] [address]  [phone]                       Patient Instructions:      Diet diabetic     Diet Cardiac      Notify your health care provider if you experience any of the following:  temperature >100.4     Notify your health care provider if you experience any of the following:  persistent nausea and vomiting or diarrhea     Notify your health care provider if you experience any of the following:  severe uncontrolled pain     Notify your health care provider if you experience any of the following:  redness, tenderness, or signs of infection (pain, swelling, redness, odor or green/yellow discharge around incision site)     Notify your health care provider if you experience any of the following:  difficulty breathing or increased cough     Notify your health care provider if you experience any of the following:  severe persistent headache     Notify your health care provider if you experience any of the following:  worsening rash     Notify your health care provider if you experience any of the following:  persistent dizziness, light-headedness, or visual disturbances     Notify your health care provider if you experience any of the following:  increased confusion or weakness     Activity as tolerated       Significant Diagnostic Studies: Labs: All labs within the past 24 hours have been reviewed    Pending Diagnostic Studies:     None         Medications:  Reconciled Home Medications:      Medication List      CHANGE how you take these medications    amiodarone 200 MG Tab  Commonly known as: PACERONE  Take 2 tablets (400 mg total) by mouth 2 (two) times daily for 7 days, THEN 1 tablet (200 mg total) 2 (two) times daily for 7 days, THEN 1 tablet (200 mg total) once daily.  Start taking on: January 6, 2023  What changed: See the new instructions.        CONTINUE taking these medications    albuterol 90 mcg/actuation inhaler  Commonly known as: PROVENTIL/VENTOLIN HFA  Inhale 2 puffs into the lungs every 4 (four) hours as needed.     ELIQUIS 5 mg Tab  Generic drug: apixaban  Take 5 mg by mouth 2 (two) times daily.      furosemide 80 MG tablet  Commonly known as: LASIX  Take 1 tablet (80 mg total) by mouth 2 (two) times daily.     hydrALAZINE 25 MG tablet  Commonly known as: APRESOLINE  Take 2 tablets (50 mg total) by mouth every 8 (eight) hours.     insulin aspart U-100 100 unit/mL (3 mL) Inpn pen  Commonly known as: NovoLOG  Inject 5 Units into the skin 3 (three) times daily.     magnesium oxide 400 mg (241.3 mg magnesium) tablet  Commonly known as: MAG-OX  Take 800 mg by mouth.     metOLazone 5 MG tablet  Commonly known as: ZAROXOLYN  TAKE 1 TABLET BY MOUTH 3 TIMES A WEEK AS NEEDED FOR SEVERE LEG SWELLING     metoprolol succinate 50 MG 24 hr tablet  Commonly known as: TOPROL-XL  Take 1 tablet (50 mg total) by mouth once daily.     multivitamin Tab  Take 1 tablet by mouth once daily.     NovoLIN 70/30 U-100 Insulin 100 unit/mL (70-30) injection  Generic drug: insulin NPH-insulin regular (70/30)  Inject 55 Units into the skin 2 (two) times daily.     potassium chloride SA 20 MEQ tablet  Commonly known as: K-DUR,KLOR-CON  Take 2 tablets by mouth once daily.     rosuvastatin 20 MG tablet  Commonly known as: CRESTOR  Take 20 mg by mouth once daily.     sildenafiL 100 MG tablet  Commonly known as: VIAGRA  TAKE 1 TABLET BY MOUTH ONCE DAILY AS NEEDED FOR ERECTILE DYSFUNCTION.     tamsulosin 0.4 mg Cap  Commonly known as: FLOMAX  Take 1 capsule (0.4 mg total) by mouth once daily.        STOP taking these medications    aspirin 81 MG Chew     famotidine 40 MG tablet  Commonly known as: PEPCID            Indwelling Lines/Drains at time of discharge:   Lines/Drains/Airways     None                 Time spent on the discharge of patient: 35 minutes         Wen Polk MD  Department of Hospital Medicine  [facility]

## 2023-01-06 NOTE — NURSING
Patient arrived to floor via wheelchair with transporter from ED.  Patient transferred to bed via x1 person assist.  AAOx4.  Patient was oriented to room, information on whiteboard, and medication regimen.  Bed low, adequate lighting provided, side rails x2 up, call bell within reach.  Admission assessment completed.  VSS.  Patient denied having any acute distress at this time.  None observed.  Patient does endorse SOB with exertion.  Will continue to monitor and follow treatment plan.      [facility]  Nurses Note -- 4 Eyes      1/5/2023       Skin assessed on: Admit      [x] No Pressure Injuries Present    [x]Prevention Measures Documented    [] Yes LDA  for Pressure Injury Previously documented     [] Yes New Pressure Injury Discovered   [] LDA for New Pressure Injury Added      Attending RN:  Katalina Mallory, RN     Second staff:  IVONNE Alexandra

## 2023-01-06 NOTE — PLAN OF CARE
01/06/23 0921   Final Note   Assessment Type Final Discharge Note   Anticipated Discharge Disposition Home   Hospital Resources/Appts/Education Provided Appointments scheduled and added to AVS   Post-Acute Status   Post-Acute Authorization Other   Other Status No Post-Acute Service Needs     Pts nurse Enrique notified that the pt can d/c from CM standpoint

## 2023-01-06 NOTE — NURSING
Pt given dc instructions and AVS. All questions answered. Pt reports readiness for dc. Md alerted of BP prior to d/c. Per MD ok to dc. Pt asymptomatic, denies blurred vision or HA.

## 2023-01-06 NOTE — HOSPITAL COURSE
Mr Chato Bowie was placed in observation for atrial flutter. Cardioverted on 1/5/23 and now NSR. On PO amiodarone and eliquis. Given IV lasix for shortness of breath that is now resolved. Stable on room air. Electrolytes supplemented. Stable for discharge to home with Cardiology follow up.

## 2023-01-09 NOTE — ANESTHESIA POSTPROCEDURE EVALUATION
Anesthesia Post Evaluation    Patient: Chato Bowie    Procedure(s) Performed: Procedure(s) (LRB):  Transesophageal echo (MARIA) intra-procedure log documentation (N/A)  Cardioversion or Defibrillation (N/A)    Final Anesthesia Type: general      Patient location during evaluation: GI PACU  Patient participation: Yes- Able to Participate  Level of consciousness: awake and alert, oriented and awake  Post-procedure vital signs: reviewed and stable  Airway patency: patent    PONV status at discharge: No PONV  Anesthetic complications: no      Cardiovascular status: blood pressure returned to baseline  Respiratory status: unassisted, spontaneous ventilation and room air  Hydration status: euvolemic  Follow-up not needed.          Vitals Value Taken Time   /81 01/06/23 1107   Temp 36.8 °C (98.2 °F) 01/06/23 1107   Pulse 72 01/06/23 1107   Resp 14 01/06/23 1107   SpO2 96 % 01/06/23 1107         No case tracking events are documented in the log.      Pain/Avery Score: No data recorded

## 2023-01-30 ENCOUNTER — HOSPITAL ENCOUNTER (OUTPATIENT)
Facility: HOSPITAL | Age: 73
Discharge: HOME | End: 2023-01-31
Attending: EMERGENCY MEDICINE | Admitting: EMERGENCY MEDICINE
Payer: MEDICARE

## 2023-01-30 DIAGNOSIS — F10.920 ALCOHOLIC INTOXICATION WITHOUT COMPLICATION: ICD-10-CM

## 2023-01-30 DIAGNOSIS — I50.33 ACUTE ON CHRONIC DIASTOLIC (CONGESTIVE) HEART FAILURE: ICD-10-CM

## 2023-01-30 DIAGNOSIS — R55 SYNCOPE, UNSPECIFIED SYNCOPE TYPE: Primary | ICD-10-CM

## 2023-01-30 DIAGNOSIS — R79.89 ELEVATED TROPONIN: ICD-10-CM

## 2023-01-30 DIAGNOSIS — R07.9 CHEST PAIN: ICD-10-CM

## 2023-01-30 DIAGNOSIS — F10.10 ALCOHOL ABUSE: ICD-10-CM

## 2023-01-30 DIAGNOSIS — E87.6 HYPOKALEMIA: ICD-10-CM

## 2023-01-30 PROBLEM — Z79.4 TYPE 2 DIABETES MELLITUS WITH KIDNEY COMPLICATION, WITH LONG-TERM CURRENT USE OF INSULIN: Chronic | Status: ACTIVE | Noted: 2017-03-10

## 2023-01-30 PROBLEM — R09.02 HYPOXIA: Status: RESOLVED | Noted: 2021-03-23 | Resolved: 2023-01-30

## 2023-01-30 PROBLEM — N18.4 CKD (CHRONIC KIDNEY DISEASE), STAGE IV: Chronic | Status: ACTIVE | Noted: 2021-03-23

## 2023-01-30 PROBLEM — I48.92 PAROXYSMAL ATRIAL FLUTTER: Chronic | Status: ACTIVE | Noted: 2020-12-07

## 2023-01-30 PROBLEM — N18.30 CKD (CHRONIC KIDNEY DISEASE), STAGE III: Status: RESOLVED | Noted: 2020-11-24 | Resolved: 2023-01-30

## 2023-01-30 PROBLEM — N40.0 BPH (BENIGN PROSTATIC HYPERPLASIA): Chronic | Status: ACTIVE | Noted: 2020-11-24

## 2023-01-30 PROBLEM — E11.65 TYPE 2 DIABETES MELLITUS WITH HYPERGLYCEMIA: Chronic | Status: ACTIVE | Noted: 2020-11-24

## 2023-01-30 PROBLEM — I50.32 CHRONIC DIASTOLIC HEART FAILURE: Chronic | Status: ACTIVE | Noted: 2022-05-12

## 2023-01-30 PROBLEM — J18.9 PNEUMONIA DUE TO INFECTIOUS ORGANISM: Status: RESOLVED | Noted: 2020-12-05 | Resolved: 2023-01-30

## 2023-01-30 PROBLEM — E11.29 TYPE 2 DIABETES MELLITUS WITH KIDNEY COMPLICATION, WITH LONG-TERM CURRENT USE OF INSULIN: Chronic | Status: ACTIVE | Noted: 2017-03-10

## 2023-01-30 LAB
ALBUMIN SERPL BCP-MCNC: 3.6 G/DL (ref 3.5–5.2)
ALP SERPL-CCNC: 69 U/L (ref 55–135)
ALT SERPL W/O P-5'-P-CCNC: 28 U/L (ref 10–44)
AMPHET+METHAMPHET UR QL: NEGATIVE
ANION GAP SERPL CALC-SCNC: 15 MMOL/L (ref 8–16)
ANION GAP SERPL CALC-SCNC: 16 MMOL/L (ref 8–16)
APAP SERPL-MCNC: <3 UG/ML (ref 10–20)
APTT BLDCRRT: 26.7 SEC (ref 21–32)
AST SERPL-CCNC: 27 U/L (ref 10–40)
BACTERIA #/AREA URNS HPF: ABNORMAL /HPF
BARBITURATES UR QL SCN>200 NG/ML: NEGATIVE
BASOPHILS # BLD AUTO: 0.08 K/UL (ref 0–0.2)
BASOPHILS NFR BLD: 1.5 % (ref 0–1.9)
BENZODIAZ UR QL SCN>200 NG/ML: NEGATIVE
BILIRUB SERPL-MCNC: 0.5 MG/DL (ref 0.1–1)
BILIRUB UR QL STRIP: NEGATIVE
BNP SERPL-MCNC: 261 PG/ML (ref 0–99)
BUN SERPL-MCNC: 87 MG/DL (ref 6–30)
BUN SERPL-MCNC: 88 MG/DL (ref 8–23)
BZE UR QL SCN: NEGATIVE
CALCIUM SERPL-MCNC: 8.8 MG/DL (ref 8.7–10.5)
CANNABINOIDS UR QL SCN: NEGATIVE
CHLORIDE SERPL-SCNC: 90 MMOL/L (ref 95–110)
CHLORIDE SERPL-SCNC: 93 MMOL/L (ref 95–110)
CLARITY UR: CLEAR
CO2 SERPL-SCNC: 27 MMOL/L (ref 23–29)
COLOR UR: YELLOW
CREAT SERPL-MCNC: 3.4 MG/DL (ref 0.5–1.4)
CREAT SERPL-MCNC: 3.5 MG/DL (ref 0.5–1.4)
CREAT UR-MCNC: 114.6 MG/DL (ref 23–375)
CTP QC/QA: YES
CTP QC/QA: YES
DIFFERENTIAL METHOD: ABNORMAL
EOSINOPHIL # BLD AUTO: 0.2 K/UL (ref 0–0.5)
EOSINOPHIL NFR BLD: 3.7 % (ref 0–8)
ERYTHROCYTE [DISTWIDTH] IN BLOOD BY AUTOMATED COUNT: 13.6 % (ref 11.5–14.5)
EST. GFR  (NO RACE VARIABLE): 18 ML/MIN/1.73 M^2
ETHANOL SERPL-MCNC: 128 MG/DL
GLUCOSE SERPL-MCNC: 149 MG/DL (ref 70–110)
GLUCOSE SERPL-MCNC: 152 MG/DL (ref 70–110)
GLUCOSE UR QL STRIP: NEGATIVE
HCT VFR BLD AUTO: 40.4 % (ref 40–54)
HCT VFR BLD CALC: 43 %PCV (ref 36–54)
HGB BLD-MCNC: 13.9 G/DL (ref 14–18)
HGB UR QL STRIP: ABNORMAL
HYALINE CASTS #/AREA URNS LPF: 125 /LPF
IMM GRANULOCYTES # BLD AUTO: 0.02 K/UL (ref 0–0.04)
IMM GRANULOCYTES NFR BLD AUTO: 0.4 % (ref 0–0.5)
INR PPP: 1.1 (ref 0.8–1.2)
KETONES UR QL STRIP: NEGATIVE
LEUKOCYTE ESTERASE UR QL STRIP: NEGATIVE
LIPASE SERPL-CCNC: 121 U/L (ref 4–60)
LYMPHOCYTES # BLD AUTO: 1.3 K/UL (ref 1–4.8)
LYMPHOCYTES NFR BLD: 24.2 % (ref 18–48)
MAGNESIUM SERPL-MCNC: 2 MG/DL (ref 1.6–2.6)
MCH RBC QN AUTO: 26.9 PG (ref 27–31)
MCHC RBC AUTO-ENTMCNC: 34.4 G/DL (ref 32–36)
MCV RBC AUTO: 78 FL (ref 82–98)
METHADONE UR QL SCN>300 NG/ML: NEGATIVE
MICROSCOPIC COMMENT: ABNORMAL
MONOCYTES # BLD AUTO: 1 K/UL (ref 0.3–1)
MONOCYTES NFR BLD: 18.8 % (ref 4–15)
NEUTROPHILS # BLD AUTO: 2.7 K/UL (ref 1.8–7.7)
NEUTROPHILS NFR BLD: 51.4 % (ref 38–73)
NITRITE UR QL STRIP: NEGATIVE
NRBC BLD-RTO: 0 /100 WBC
OPIATES UR QL SCN: NEGATIVE
PCP UR QL SCN>25 NG/ML: NEGATIVE
PH UR STRIP: 6 [PH] (ref 5–8)
PLATELET # BLD AUTO: 215 K/UL (ref 150–450)
PMV BLD AUTO: 9.3 FL (ref 9.2–12.9)
POC IONIZED CALCIUM: 1.08 MMOL/L (ref 1.06–1.42)
POC MOLECULAR INFLUENZA A AGN: NEGATIVE
POC MOLECULAR INFLUENZA B AGN: NEGATIVE
POC TCO2 (MEASURED): 32 MMOL/L (ref 23–29)
POCT GLUCOSE: 157 MG/DL (ref 70–110)
POCT GLUCOSE: 93 MG/DL (ref 70–110)
POTASSIUM BLD-SCNC: 2.8 MMOL/L (ref 3.5–5.1)
POTASSIUM SERPL-SCNC: 2.7 MMOL/L (ref 3.5–5.1)
PROT SERPL-MCNC: 7.5 G/DL (ref 6–8.4)
PROT UR QL STRIP: ABNORMAL
PROTHROMBIN TIME: 11.3 SEC (ref 9–12.5)
RBC # BLD AUTO: 5.16 M/UL (ref 4.6–6.2)
RBC #/AREA URNS HPF: 12 /HPF (ref 0–4)
SALICYLATES SERPL-MCNC: <5 MG/DL (ref 15–30)
SAMPLE: ABNORMAL
SARS-COV-2 RDRP RESP QL NAA+PROBE: NEGATIVE
SODIUM BLD-SCNC: 135 MMOL/L (ref 136–145)
SODIUM SERPL-SCNC: 135 MMOL/L (ref 136–145)
SP GR UR STRIP: 1.01 (ref 1–1.03)
SQUAMOUS #/AREA URNS HPF: 1 /HPF
T4 FREE SERPL-MCNC: 1.46 NG/DL (ref 0.71–1.51)
TOXICOLOGY INFORMATION: NORMAL
TROPONIN I SERPL DL<=0.01 NG/ML-MCNC: 0.06 NG/ML (ref 0–0.03)
TROPONIN I SERPL DL<=0.01 NG/ML-MCNC: 0.07 NG/ML (ref 0–0.03)
TSH SERPL DL<=0.005 MIU/L-ACNC: 4.14 UIU/ML (ref 0.4–4)
UNIDENT CRYS URNS QL MICRO: ABNORMAL
URN SPEC COLLECT METH UR: ABNORMAL
UROBILINOGEN UR STRIP-ACNC: NEGATIVE EU/DL
WBC # BLD AUTO: 5.17 K/UL (ref 3.9–12.7)
WBC #/AREA URNS HPF: 1 /HPF (ref 0–5)

## 2023-01-30 PROCEDURE — 80179 DRUG ASSAY SALICYLATE: CPT | Performed by: EMERGENCY MEDICINE

## 2023-01-30 PROCEDURE — 82330 ASSAY OF CALCIUM: CPT

## 2023-01-30 PROCEDURE — 84443 ASSAY THYROID STIM HORMONE: CPT | Performed by: EMERGENCY MEDICINE

## 2023-01-30 PROCEDURE — 93010 ELECTROCARDIOGRAM REPORT: CPT | Mod: ,,, | Performed by: INTERNAL MEDICINE

## 2023-01-30 PROCEDURE — 80307 DRUG TEST PRSMV CHEM ANLYZR: CPT | Performed by: EMERGENCY MEDICINE

## 2023-01-30 PROCEDURE — 87502 INFLUENZA DNA AMP PROBE: CPT

## 2023-01-30 PROCEDURE — 80053 COMPREHEN METABOLIC PANEL: CPT | Performed by: EMERGENCY MEDICINE

## 2023-01-30 PROCEDURE — 99900035 HC TECH TIME PER 15 MIN (STAT)

## 2023-01-30 PROCEDURE — 85025 COMPLETE CBC W/AUTO DIFF WBC: CPT | Performed by: EMERGENCY MEDICINE

## 2023-01-30 PROCEDURE — 25000003 PHARM REV CODE 250: Performed by: HOSPITALIST

## 2023-01-30 PROCEDURE — 83690 ASSAY OF LIPASE: CPT | Performed by: EMERGENCY MEDICINE

## 2023-01-30 PROCEDURE — G0378 HOSPITAL OBSERVATION PER HR: HCPCS

## 2023-01-30 PROCEDURE — 96365 THER/PROPH/DIAG IV INF INIT: CPT

## 2023-01-30 PROCEDURE — 82077 ASSAY SPEC XCP UR&BREATH IA: CPT | Performed by: EMERGENCY MEDICINE

## 2023-01-30 PROCEDURE — 82565 ASSAY OF CREATININE: CPT

## 2023-01-30 PROCEDURE — 80047 BASIC METABLC PNL IONIZED CA: CPT

## 2023-01-30 PROCEDURE — 93010 EKG 12-LEAD: ICD-10-PCS | Mod: ,,, | Performed by: INTERNAL MEDICINE

## 2023-01-30 PROCEDURE — 80143 DRUG ASSAY ACETAMINOPHEN: CPT | Performed by: EMERGENCY MEDICINE

## 2023-01-30 PROCEDURE — 96366 THER/PROPH/DIAG IV INF ADDON: CPT

## 2023-01-30 PROCEDURE — 85610 PROTHROMBIN TIME: CPT | Performed by: EMERGENCY MEDICINE

## 2023-01-30 PROCEDURE — 84439 ASSAY OF FREE THYROXINE: CPT | Performed by: EMERGENCY MEDICINE

## 2023-01-30 PROCEDURE — 81000 URINALYSIS NONAUTO W/SCOPE: CPT | Mod: 59 | Performed by: EMERGENCY MEDICINE

## 2023-01-30 PROCEDURE — 83880 ASSAY OF NATRIURETIC PEPTIDE: CPT | Performed by: EMERGENCY MEDICINE

## 2023-01-30 PROCEDURE — 85730 THROMBOPLASTIN TIME PARTIAL: CPT | Performed by: EMERGENCY MEDICINE

## 2023-01-30 PROCEDURE — 63600175 PHARM REV CODE 636 W HCPCS: Performed by: EMERGENCY MEDICINE

## 2023-01-30 PROCEDURE — 84132 ASSAY OF SERUM POTASSIUM: CPT

## 2023-01-30 PROCEDURE — 93005 ELECTROCARDIOGRAM TRACING: CPT

## 2023-01-30 PROCEDURE — 83735 ASSAY OF MAGNESIUM: CPT | Performed by: EMERGENCY MEDICINE

## 2023-01-30 PROCEDURE — 99285 EMERGENCY DEPT VISIT HI MDM: CPT | Mod: 25

## 2023-01-30 PROCEDURE — 84295 ASSAY OF SERUM SODIUM: CPT

## 2023-01-30 PROCEDURE — 82962 GLUCOSE BLOOD TEST: CPT | Mod: 91

## 2023-01-30 PROCEDURE — 87635 SARS-COV-2 COVID-19 AMP PRB: CPT | Performed by: EMERGENCY MEDICINE

## 2023-01-30 PROCEDURE — 84484 ASSAY OF TROPONIN QUANT: CPT | Mod: 91 | Performed by: EMERGENCY MEDICINE

## 2023-01-30 RX ORDER — TALC
6 POWDER (GRAM) TOPICAL NIGHTLY PRN
Status: DISCONTINUED | OUTPATIENT
Start: 2023-01-30 | End: 2023-01-31 | Stop reason: HOSPADM

## 2023-01-30 RX ORDER — SODIUM CHLORIDE 0.9 % (FLUSH) 0.9 %
10 SYRINGE (ML) INJECTION EVERY 8 HOURS PRN
Status: DISCONTINUED | OUTPATIENT
Start: 2023-01-30 | End: 2023-01-31 | Stop reason: HOSPADM

## 2023-01-30 RX ORDER — PROCHLORPERAZINE EDISYLATE 5 MG/ML
5 INJECTION INTRAMUSCULAR; INTRAVENOUS EVERY 6 HOURS PRN
Status: DISCONTINUED | OUTPATIENT
Start: 2023-01-30 | End: 2023-01-31 | Stop reason: HOSPADM

## 2023-01-30 RX ORDER — ATORVASTATIN CALCIUM 40 MG/1
80 TABLET, FILM COATED ORAL DAILY
Status: DISCONTINUED | OUTPATIENT
Start: 2023-01-31 | End: 2023-01-31 | Stop reason: HOSPADM

## 2023-01-30 RX ORDER — LOSARTAN POTASSIUM AND HYDROCHLOROTHIAZIDE 12.5; 5 MG/1; MG/1
1 TABLET ORAL DAILY
Status: ON HOLD | COMMUNITY
Start: 2022-12-08 | End: 2023-06-06 | Stop reason: HOSPADM

## 2023-01-30 RX ORDER — AMLODIPINE BESYLATE 10 MG/1
10 TABLET ORAL DAILY
Status: ON HOLD | COMMUNITY
Start: 2022-12-08 | End: 2023-06-16 | Stop reason: HOSPADM

## 2023-01-30 RX ORDER — POTASSIUM CHLORIDE 20 MEQ/1
40 TABLET, EXTENDED RELEASE ORAL ONCE
Status: DISCONTINUED | OUTPATIENT
Start: 2023-01-30 | End: 2023-01-31 | Stop reason: HOSPADM

## 2023-01-30 RX ORDER — SODIUM CHLORIDE AND POTASSIUM CHLORIDE 150; 900 MG/100ML; MG/100ML
INJECTION, SOLUTION INTRAVENOUS
Status: COMPLETED | OUTPATIENT
Start: 2023-01-30 | End: 2023-01-31

## 2023-01-30 RX ORDER — GLUCAGON 1 MG
1 KIT INJECTION
Status: DISCONTINUED | OUTPATIENT
Start: 2023-01-30 | End: 2023-01-31 | Stop reason: HOSPADM

## 2023-01-30 RX ORDER — ONDANSETRON 2 MG/ML
4 INJECTION INTRAMUSCULAR; INTRAVENOUS EVERY 8 HOURS PRN
Status: DISCONTINUED | OUTPATIENT
Start: 2023-01-30 | End: 2023-01-31 | Stop reason: HOSPADM

## 2023-01-30 RX ORDER — IBUPROFEN 200 MG
16 TABLET ORAL
Status: DISCONTINUED | OUTPATIENT
Start: 2023-01-30 | End: 2023-01-31 | Stop reason: HOSPADM

## 2023-01-30 RX ORDER — IPRATROPIUM BROMIDE AND ALBUTEROL SULFATE 2.5; .5 MG/3ML; MG/3ML
3 SOLUTION RESPIRATORY (INHALATION) EVERY 4 HOURS PRN
Status: DISCONTINUED | OUTPATIENT
Start: 2023-01-30 | End: 2023-01-31 | Stop reason: HOSPADM

## 2023-01-30 RX ORDER — POTASSIUM CHLORIDE 20 MEQ/1
40 TABLET, EXTENDED RELEASE ORAL DAILY
Status: DISCONTINUED | OUTPATIENT
Start: 2023-01-31 | End: 2023-01-30

## 2023-01-30 RX ORDER — INSULIN ASPART 100 [IU]/ML
1-10 INJECTION, SOLUTION INTRAVENOUS; SUBCUTANEOUS
Status: DISCONTINUED | OUTPATIENT
Start: 2023-01-30 | End: 2023-01-31 | Stop reason: HOSPADM

## 2023-01-30 RX ORDER — MAG HYDROX/ALUMINUM HYD/SIMETH 200-200-20
30 SUSPENSION, ORAL (FINAL DOSE FORM) ORAL 4 TIMES DAILY PRN
Status: DISCONTINUED | OUTPATIENT
Start: 2023-01-30 | End: 2023-01-31 | Stop reason: HOSPADM

## 2023-01-30 RX ORDER — IBUPROFEN 200 MG
24 TABLET ORAL
Status: DISCONTINUED | OUTPATIENT
Start: 2023-01-30 | End: 2023-01-31 | Stop reason: HOSPADM

## 2023-01-30 RX ORDER — SIMETHICONE 80 MG
1 TABLET,CHEWABLE ORAL 4 TIMES DAILY PRN
Status: DISCONTINUED | OUTPATIENT
Start: 2023-01-30 | End: 2023-01-31 | Stop reason: HOSPADM

## 2023-01-30 RX ORDER — AMIODARONE HYDROCHLORIDE 200 MG/1
200 TABLET ORAL DAILY
Status: DISCONTINUED | OUTPATIENT
Start: 2023-01-31 | End: 2023-01-31 | Stop reason: HOSPADM

## 2023-01-30 RX ORDER — NALOXONE HCL 0.4 MG/ML
0.02 VIAL (ML) INJECTION
Status: DISCONTINUED | OUTPATIENT
Start: 2023-01-30 | End: 2023-01-31 | Stop reason: HOSPADM

## 2023-01-30 RX ORDER — POLYETHYLENE GLYCOL 3350 17 G/17G
17 POWDER, FOR SOLUTION ORAL DAILY
Status: DISCONTINUED | OUTPATIENT
Start: 2023-01-31 | End: 2023-01-31 | Stop reason: HOSPADM

## 2023-01-30 RX ORDER — ACETAMINOPHEN 325 MG/1
650 TABLET ORAL EVERY 6 HOURS PRN
Status: DISCONTINUED | OUTPATIENT
Start: 2023-01-30 | End: 2023-01-31 | Stop reason: HOSPADM

## 2023-01-30 RX ADMIN — APIXABAN 5 MG: 5 TABLET, FILM COATED ORAL at 10:01

## 2023-01-30 RX ADMIN — SODIUM CHLORIDE AND POTASSIUM CHLORIDE: 9; 1.49 INJECTION, SOLUTION INTRAVENOUS at 06:01

## 2023-01-30 NOTE — Clinical Note
Diagnosis: Syncope, unspecified syncope type [2049516]   Future Attending Provider: FINESSE NICHOLSON [[id number]]   Admitting Provider:: ZEB BONNER [[id number]]

## 2023-01-30 NOTE — ED TRIAGE NOTES
"Pt to ED via EMS with complaints of unresponsiveness. Upon arrival, pt is awake. According to EMS, pt went to [facility] for a PCP appointment today. Reports while there, pt went completely unresponsive to all stimuli. EMS states pt "came to" while approx 1 min from arriving to ED. Pt . EKG abnormal. Pt complaining of mid sternal chest pain. Pt does have cardiac/pulmonary hx. Pt hypertensive, tachypnic. Otherwise vitals WNL.   "

## 2023-01-30 NOTE — ED PROVIDER NOTES
Encounter Date: 1/30/2023    SCRIBE #1 NOTE: I, Jennie Vasquez, am scribing for, and in the presence of,  Toan Acosta MD. I have scribed the following portions of the note - Other sections scribed: HPI, ROS, PE.     History     Chief Complaint   Patient presents with    Fatigue     Pt sent from [facility] for lethargy, pt went unresponsive according to staff, cbg 158 abnormal ekg, reports chest pain and weakness midsternal      Chato Bowie is a 72 y.o. male, with a PMHx of CAD, HTN, and DM, who presents to the ED with fatigue today while the patient was at [facility] today. EMS reports the staff at [facility] state the patient became unresponsive. Patient has a cbg of 158 and an abnormal ekg. Patient is complaining of mid-sternal chest pain, weakness, and abdominal pain. Patient reports he did eat a meal before going to [facility]. No other exacerbating or alleviating factors. Patient denies any other associated symptoms.    Took eliquis this morning    The history is provided by the EMS personnel and the patient. No  was used.   Review of patient's allergies indicates:  No Known Allergies  Past Medical History:   Diagnosis Date    Acute congestive heart failure 3/20/2020    Alcohol abuse     Arthritis     Asthma attack 11/24/2021    Coronary artery disease     Diabetes mellitus     Hyperlipemia     Hypertension     Rhabdomyolysis      Past Surgical History:   Procedure Laterality Date    EYE SURGERY      TRANSESOPHAGEAL ECHOCARDIOGRAM WITH POSSIBLE CARDIOVERSION (MARIA W/ POSS CARDIOVERSION) N/A 1/5/2023    Procedure: Transesophageal echo (MARIA) intra-procedure log documentation;  Surgeon: Indra Foote MD;  Location: [facility] CATH LAB;  Service: Cardiology;  Laterality: N/A;    TREATMENT OF CARDIAC ARRHYTHMIA N/A 12/7/2020    Procedure: Cardioversion or Defibrillation;  Surgeon: Indra Foote MD;  Location: [facility] CATH LAB;  Service: Cardiology;  Laterality: N/A;    TREATMENT OF CARDIAC  ARRHYTHMIA N/A 12/30/2020    Procedure: Cardioversion or Defibrillation;  Surgeon: Tyrone Steen MD;  Location: [facility] CATH LAB;  Service: Cardiology;  Laterality: N/A;    TREATMENT OF CARDIAC ARRHYTHMIA N/A 1/5/2023    Procedure: Cardioversion or Defibrillation;  Surgeon: Indra Foote MD;  Location: [facility] CATH LAB;  Service: Cardiology;  Laterality: N/A;    VASCULAR SURGERY       Family History   Problem Relation Age of Onset    Hypertension Mother     Diabetes Mother     Diabetes Father     Hypertension Father     Diabetes Sister     Hypertension Sister      Social History     Tobacco Use    Smoking status: Never    Smokeless tobacco: Never   Substance Use Topics    Alcohol use: Not Currently     Alcohol/week: 4.0 standard drinks     Types: 4 Cans of beer per week    Drug use: No     Review of Systems   Constitutional:  Positive for fatigue. Negative for chills and fever.   HENT:  Negative for congestion and sore throat.    Eyes:  Negative for pain and visual disturbance.   Respiratory:  Negative for chest tightness and shortness of breath.    Cardiovascular:  Positive for chest pain (mid-sternal).   Gastrointestinal:  Positive for abdominal pain. Negative for nausea.   Endocrine: Negative for polydipsia and polyuria.   Genitourinary:  Negative for dysuria and flank pain.   Musculoskeletal:  Negative for back pain, neck pain and neck stiffness.   Skin:  Negative for rash.   Allergic/Immunologic: Negative for immunocompromised state.   Neurological:  Positive for weakness (general). Negative for dizziness.   Hematological:  Does not bruise/bleed easily.   Psychiatric/Behavioral:  Negative for agitation and behavioral problems.    All other systems reviewed and are negative.    Physical Exam     Initial Vitals   BP Pulse Resp Temp SpO2   01/30/23 1602 01/30/23 1602 01/30/23 1602 01/30/23 1606 01/30/23 1602   (!) 196/89 (!) 57 (!) 30 97.9 °F (36.6 °C) 100 %      MAP       --                Physical  Exam    Nursing note and vitals reviewed.  Constitutional: He appears well-developed and well-nourished.   HENT:   Head: Normocephalic and atraumatic.   Mouth/Throat: Mucous membranes are normal.   Patent   Eyes: Conjunctivae and EOM are normal. Pupils are equal, round, and reactive to light. Right conjunctiva is not injected. Left conjunctiva is not injected.   sclera anicteric   Neck: Neck supple.    Full passive range of motion without pain.     Cardiovascular:  Regular rhythm, S1 normal, S2 normal and normal heart sounds.     Exam reveals no gallop and no friction rub.       No murmur heard.  Pulses:       Radial pulses are 2+ on the right side and 2+ on the left side.   Pulmonary/Chest: Effort normal and breath sounds normal. No respiratory distress.   Abdominal: Abdomen is soft.   Musculoskeletal:      Cervical back: Full passive range of motion without pain and neck supple.     Neurological: Gait normal.   Skin: Skin is warm. No ecchymosis and no rash noted.   Psychiatric: He has a normal mood and affect. Thought content normal.       ED Course   Procedures  Labs Reviewed   COMPREHENSIVE METABOLIC PANEL - Abnormal; Notable for the following components:       Result Value    Sodium 135 (*)     Potassium 2.7 (*)     Chloride 93 (*)     Glucose 149 (*)     BUN 88 (*)     Creatinine 3.4 (*)     eGFR 18 (*)     All other components within normal limits    Narrative:     K critical result(s) called and verbal readback obtained from PAOLA Cee RN by SUREKHA 01/30/2023 17:08   CBC W/ AUTO DIFFERENTIAL - Abnormal; Notable for the following components:    Hemoglobin 13.9 (*)     MCV 78 (*)     MCH 26.9 (*)     Mono % 18.8 (*)     All other components within normal limits   URINALYSIS, REFLEX TO URINE CULTURE - Abnormal; Notable for the following components:    Protein, UA 2+ (*)     Occult Blood UA 1+ (*)     All other components within normal limits    Narrative:     Specimen Source->Urine   TSH - Abnormal; Notable for  the following components:    TSH 4.141 (*)     All other components within normal limits   LIPASE - Abnormal; Notable for the following components:    Lipase 121 (*)     All other components within normal limits   ALCOHOL,MEDICAL (ETHANOL) - Abnormal; Notable for the following components:    Alcohol, Serum 128 (*)     All other components within normal limits   SALICYLATE LEVEL - Abnormal; Notable for the following components:    Salicylate Lvl <5.0 (*)     All other components within normal limits   ACETAMINOPHEN LEVEL - Abnormal; Notable for the following components:    Acetaminophen (Tylenol), Serum <3.0 (*)     All other components within normal limits   TROPONIN I - Abnormal; Notable for the following components:    Troponin I 0.059 (*)     All other components within normal limits   B-TYPE NATRIURETIC PEPTIDE - Abnormal; Notable for the following components:     (*)     All other components within normal limits   URINALYSIS MICROSCOPIC - Abnormal; Notable for the following components:    RBC, UA 12 (*)     Hyaline Casts,  (*)     All other components within normal limits    Narrative:     Specimen Source->Urine   POCT GLUCOSE - Abnormal; Notable for the following components:    POCT Glucose 157 (*)     All other components within normal limits   ISTAT PROCEDURE - Abnormal; Notable for the following components:    POC Glucose 152 (*)     POC BUN 87 (*)     POC Creatinine 3.5 (*)     POC Sodium 135 (*)     POC Potassium 2.8 (*)     POC Chloride 90 (*)     POC TCO2 (MEASURED) 32 (*)     All other components within normal limits   APTT   PROTIME-INR   DRUG SCREEN PANEL, URINE EMERGENCY    Narrative:     Specimen Source->Urine   T4, FREE   TROPONIN I   SARS-COV-2 RDRP GENE   POCT INFLUENZA A/B MOLECULAR   ISTAT CHEM8   POCT GLUCOSE MONITORING CONTINUOUS     EKG Readings: (Independently Interpreted)   EKG showing atrial fibrillation with slow ventricular response with left bundle-branch block with  biphasic T-waves inferiorly and laterally.  No ST elevation.  Normal axis.  Abnormal EKG.  Compared to previous similar.     Imaging Results              CT Head Without Contrast (Final result)  Result time 01/30/23 17:14:06      Final result by Mine Dasilva MD (01/30/23 17:14:06)                   Impression:      No acute intracranial abnormalities identified.    Generalized cerebral volume loss, chronic microvascular ischemic disease, small remote left frontal lobe infarction, and small remote lacunar infarct within the right thalamus.      Electronically signed by: Mine Dasilva MD  Date:    01/30/2023  Time:    17:14               Narrative:    EXAMINATION:  CT HEAD WITHOUT CONTRAST    CLINICAL HISTORY:  Mental status change, unknown cause;    TECHNIQUE:  Low dose axial images were obtained through the head.  Coronal and sagittal reformations were also performed. Contrast was not administered.    COMPARISON:  None.    FINDINGS:  There is generalized cerebral volume loss with chronic microvascular ischemic disease.  Small region of remote infarction with encephalomalacia is seen involving the left frontal lobe.  Small remote lacunar infarct is seen involving the right thalamus.  No evidence of acute/recent major vascular distribution cerebral infarction, intraparenchymal hemorrhage, or intra-axial space occupying lesion. The ventricular system is normal in size and configuration with no evidence of hydrocephalus. No effacement of the skull-base cisterns. No abnormal extra-axial fluid collections or blood products. Visualized paranasal sinuses and mastoid air cells are clear. The calvarium shows no significant abnormality.  Soft tissue swelling is seen involving the midline posterior septal scalp region.                                       CT Chest Abdomen Pelvis Without Contrast (XPD) (Final result)  Result time 01/30/23 17:26:08      Final result by Mine Dasilva MD (01/30/23 17:26:08)                    Impression:      1. No evidence of aortic aneurysm, as clinically questioned.  2. No acute intrathoracic or intra-abdominal abnormalities identified.  3. Cholelithiasis.  4. Scattered colonic diverticulosis with no evidence of acute diverticulitis.  5. Multiple additional findings as detailed above.      Electronically signed by: Mine Dasilva MD  Date:    01/30/2023  Time:    17:26               Narrative:    EXAMINATION:  CT CHEST ABDOMEN PELVIS WITHOUT CONTRAST(XPD)    CLINICAL HISTORY:  Aortic aneurysm, known or suspected;    TECHNIQUE:  CT chest abdomen and pelvis was obtained without the use of IV contrast.    COMPARISON:  CT abdomen and pelvis from November 2021.    FINDINGS:  Aorta is nonaneurysmal.  Ascending thoracic aorta measures upper limits of normal in caliber at 3.9 cm.  Descending thoracic aorta measures upper limits of normal in caliber at 3.0 cm.  Heart is enlarged no pericardial effusion.  Mild aortic atherosclerosis is seen.  Prominent coronary artery calcification and aortic valve calcification are also visualized.  Scattered air seen throughout the esophagus.    Vocal cords are opposed presumably due to breath-holding.  Major airways are otherwise patent.  Lungs are symmetrically expanded.  Suspected mild scarring is seen at the lateral periphery of the right lung, noting remote healed right lateral rib fractures in this region.  Lungs otherwise show no consolidation.  No pleural effusion.    Small scattered calcified hepatic and splenic granulomas are noted.  Otherwise no significant hepatic abnormality seen on this noncontrast exam.  There is no intra-or extrahepatic biliary ductal dilatation.  Gallbladder is partially contracted with single calcified stone seen.  The stomach and adrenal glands are unremarkable.  Few scattered pancreatic parenchymal calcifications are seen which could reflect sequela of chronic pancreatitis.    Kidneys show no evidence of stones or hydronephrosis.  Ureters are unremarkable along their courses.  Urinary bladder and prostate are unremarkable.    Appendix is visualized and is unremarkable.  The visualized loops of small and large bowel show no evidence of obstruction or inflammation.  Scattered colonic diverticulosis is seen.  No free air or free fluid.    Abdominal aorta tapers normally with mild atherosclerosis.    No acute osseous abnormality identified.  Degenerative changes are seen throughout the mid to lower thoracic spine and lower lumbar spine.  Subcutaneous soft tissues show no significant abnormalities.                                       X-Ray Chest AP Portable (Final result)  Result time 01/30/23 16:42:12      Final result by Miles Clemons MD (01/30/23 16:42:12)                   Impression:      1. Mild enlargement of the cardiac silhouette which may reflect cardiomegaly and/or pericardial effusion, not significantly changed.  2. Borderline pulmonary interstitial edema with blunting of the right costophrenic angle suggesting trace to small right-sided pleural effusion with overlying atelectasis and/or pneumonia.      Electronically signed by: Miles Clemons  Date:    01/30/2023  Time:    16:42               Narrative:    EXAMINATION:  XR CHEST AP PORTABLE    CLINICAL HISTORY:  chest pain;    TECHNIQUE:  Single frontal portable view of the chest was performed.    COMPARISON:  Chest radiograph 01/05/2023    FINDINGS:  Mild enlargement of the cardiac silhouette which may reflect cardiomegaly and/or pericardial effusion, not significantly changed..  Mediastinal silhouette is within normal limits.    Borderline pulmonary interstitial edema with blunting of the right costophrenic angle suggesting trace to small right-sided pleural effusion with overlying atelectasis and/or pneumonia.  No pneumothorax bilaterally.    Multilevel degenerative changes of the imaged spine.                                       Medications   0.9 % NaCl with KCl 20 mEq  infusion (has no administration in time range)     Medical Decision Making:   History:   Old Medical Records: I decided to obtain old medical records.  Old Records Summarized: records from previous admission(s).       <> Summary of Records: Hospital Course:   Mr Chato Bowie was placed in observation for atrial flutter. Cardioverted on 1/5/23 and now NSR. On PO amiodarone and eliquis. Given IV lasix for shortness of breath that is now resolved. Stable on room air. Electrolytes supplemented. Stable for discharge to home with Cardiology follow up.         Goals of Care Treatment Preferences:  Code Status: Full Code    Medication List     CHANGE how you take these medications   amiodarone 200 MG Tab  Commonly known as: PACERONE  Take 2 tablets (400 mg total) by mouth 2 (two) times daily for 7 days, THEN 1 tablet (200 mg total) 2 (two) times daily for 7 days, THEN 1 tablet (200 mg total) once daily.  Start taking on: January 6, 2023  What changed: See the new instructions.        CONTINUE taking these medications   albuterol 90 mcg/actuation inhaler  Commonly known as: PROVENTIL/VENTOLIN HFA  Inhale 2 puffs into the lungs every 4 (four) hours as needed.     ELIQUIS 5 mg Tab  Generic drug: apixaban  Take 5 mg by mouth 2 (two) times daily.     furosemide 80 MG tablet  Commonly known as: LASIX  Take 1 tablet (80 mg total) by mouth 2 (two) times daily.     hydrALAZINE 25 MG tablet  Commonly known as: APRESOLINE  Take 2 tablets (50 mg total) by mouth every 8 (eight) hours.     insulin aspart U-100 100 unit/mL (3 mL) Inpn pen  Commonly known as: NovoLOG  Inject 5 Units into the skin 3 (three) times daily.     magnesium oxide 400 mg (241.3 mg magnesium) tablet  Commonly known as: MAG-OX  Take 800 mg by mouth.     metOLazone 5 MG tablet  Commonly known as: ZAROXOLYN  TAKE 1 TABLET BY MOUTH 3 TIMES A WEEK AS NEEDED FOR SEVERE LEG SWELLING     metoprolol succinate 50 MG 24 hr tablet  Commonly known as: TOPROL-XL  Take 1 tablet  (50 mg total) by mouth once daily.     multivitamin Tab  Take 1 tablet by mouth once daily.     NovoLIN 70/30 U-100 Insulin 100 unit/mL (70-30) injection  Generic drug: insulin NPH-insulin regular (70/30)  Inject 55 Units into the skin 2 (two) times daily.     potassium chloride SA 20 MEQ tablet  Commonly known as: K-DUR,KLOR-CON  Take 2 tablets by mouth once daily.     rosuvastatin 20 MG tablet  Commonly known as: CRESTOR  Take 20 mg by mouth once daily.     sildenafiL 100 MG tablet  Commonly known as: VIAGRA  TAKE 1 TABLET BY MOUTH ONCE DAILY AS NEEDED FOR ERECTILE DYSFUNCTION.     tamsulosin 0.4 mg Cap  Commonly known as: FLOMAX  Take 1 capsule (0.4 mg total) by mouth once daily.        STOP taking these medications   aspirin 81 MG Chew     famotidine 40 MG tablet  Commonly known as: PEPCID          Indwelling Lines/Drains at time of discharge:     Lines/Drains/Airways     None       Time spent on the discharge of patient: 35 minutes       Wen Polk MD  Department of Hospital Medicine  [facility]        Initial Assessment:   72-year-old male with multiple medical problems presenting today secondary to what appears to be altered mental status for syncopal event.  No trauma.  Patient was unresponsive with EMS.  No obvious seizure-like activity.  Sugar reassuring.  Vitals show mild hypertension.  Patient more awake here.  I lean more towards syncopal event.  However he does have a history of alcohol abuse.  This could be confounding.  Also could be electrolyte abnormalities and/or anemia.  Infectious is a consideration.  He is complaining of chest epigastric pain.  Patient's point of care creatinine is elevated from baseline.  Patient was found to be hypokalemic.  IV fluids with potassium.  Patient then went back to sleep.  Being all extremities no focal neurological deficits.  Getting CT head and CT of the chest abdomen pelvis.  Can not give IV contrast secondary to creatinine.  CT reviewed.  No  acute findings.  CT head shows no acute findings.  Troponin is elevated from previous but only slightly.  Creatinine has gone from 2 to over 3.  Patient agreeable for observation.  Took Eliquis this morning so already anticoagulated.  Alcohol level elevated.  Placed observation further workup management.  Independently Interpreted Test(s):   I have ordered and independently interpreted EKG Reading(s) - see prior notes  Clinical Tests:   Lab Tests: Ordered and Reviewed  Radiological Study: Reviewed and Ordered  Medical Tests: Ordered and Reviewed        Scribe Attestation:   Scribe #1: I performed the above scribed service and the documentation accurately describes the services I performed. I attest to the accuracy of the note.                   Clinical Impression:   Final diagnoses:  [R07.9] Chest pain  [R55] Syncope, unspecified syncope type (Primary)  [F10.920] Alcoholic intoxication without complication  [E87.6] Hypokalemia  [R77.8] Elevated troponin        ED Disposition Condition    Observation Stable        I, toan acosta, personally performed the services described in this documentation. All medical record entries made by the scribe were at my direction and in my presence. I have reviewed the chart and agree that the record reflects my personal performance and is accurate and complete.         Toan Acosta MD  01/30/23 1724

## 2023-01-31 VITALS
HEART RATE: 65 BPM | BODY MASS INDEX: 30.68 KG/M2 | SYSTOLIC BLOOD PRESSURE: 168 MMHG | OXYGEN SATURATION: 95 % | DIASTOLIC BLOOD PRESSURE: 74 MMHG | TEMPERATURE: 99 F | WEIGHT: 220 LBS | RESPIRATION RATE: 18 BRPM

## 2023-01-31 LAB
ALBUMIN SERPL BCP-MCNC: 3.4 G/DL (ref 3.5–5.2)
ALP SERPL-CCNC: 63 U/L (ref 55–135)
ALT SERPL W/O P-5'-P-CCNC: 27 U/L (ref 10–44)
ANION GAP SERPL CALC-SCNC: 14 MMOL/L (ref 8–16)
AST SERPL-CCNC: 28 U/L (ref 10–40)
BILIRUB SERPL-MCNC: 0.6 MG/DL (ref 0.1–1)
BUN SERPL-MCNC: 81 MG/DL (ref 8–23)
CALCIUM SERPL-MCNC: 8.9 MG/DL (ref 8.7–10.5)
CHLORIDE SERPL-SCNC: 99 MMOL/L (ref 95–110)
CO2 SERPL-SCNC: 26 MMOL/L (ref 23–29)
CREAT SERPL-MCNC: 2.7 MG/DL (ref 0.5–1.4)
EST. GFR  (NO RACE VARIABLE): 24 ML/MIN/1.73 M^2
GLUCOSE SERPL-MCNC: 83 MG/DL (ref 70–110)
MAGNESIUM SERPL-MCNC: 1.9 MG/DL (ref 1.6–2.6)
PHOSPHATE SERPL-MCNC: 3.9 MG/DL (ref 2.7–4.5)
POCT GLUCOSE: 104 MG/DL (ref 70–110)
POTASSIUM SERPL-SCNC: 3.2 MMOL/L (ref 3.5–5.1)
PROT SERPL-MCNC: 7.2 G/DL (ref 6–8.4)
SODIUM SERPL-SCNC: 139 MMOL/L (ref 136–145)
TROPONIN I SERPL DL<=0.01 NG/ML-MCNC: 0.06 NG/ML (ref 0–0.03)

## 2023-01-31 PROCEDURE — G0378 HOSPITAL OBSERVATION PER HR: HCPCS

## 2023-01-31 PROCEDURE — 96366 THER/PROPH/DIAG IV INF ADDON: CPT

## 2023-01-31 PROCEDURE — 84484 ASSAY OF TROPONIN QUANT: CPT | Performed by: HOSPITALIST

## 2023-01-31 PROCEDURE — 63600175 PHARM REV CODE 636 W HCPCS: Performed by: NURSE PRACTITIONER

## 2023-01-31 PROCEDURE — 83735 ASSAY OF MAGNESIUM: CPT | Performed by: HOSPITALIST

## 2023-01-31 PROCEDURE — 84100 ASSAY OF PHOSPHORUS: CPT | Performed by: HOSPITALIST

## 2023-01-31 PROCEDURE — 25000003 PHARM REV CODE 250: Performed by: HOSPITALIST

## 2023-01-31 PROCEDURE — 82962 GLUCOSE BLOOD TEST: CPT

## 2023-01-31 PROCEDURE — 25000003 PHARM REV CODE 250: Performed by: NURSE PRACTITIONER

## 2023-01-31 PROCEDURE — 80053 COMPREHEN METABOLIC PANEL: CPT | Performed by: HOSPITALIST

## 2023-01-31 PROCEDURE — 96375 TX/PRO/DX INJ NEW DRUG ADDON: CPT

## 2023-01-31 RX ORDER — AMLODIPINE BESYLATE 5 MG/1
10 TABLET ORAL DAILY
Status: DISCONTINUED | OUTPATIENT
Start: 2023-01-31 | End: 2023-01-31 | Stop reason: HOSPADM

## 2023-01-31 RX ORDER — HYDRALAZINE HYDROCHLORIDE 20 MG/ML
10 INJECTION INTRAMUSCULAR; INTRAVENOUS ONCE
Status: DISCONTINUED | OUTPATIENT
Start: 2023-01-31 | End: 2023-01-31

## 2023-01-31 RX ORDER — HYDRALAZINE HYDROCHLORIDE 25 MG/1
50 TABLET, FILM COATED ORAL EVERY 8 HOURS
Status: DISCONTINUED | OUTPATIENT
Start: 2023-01-31 | End: 2023-01-31

## 2023-01-31 RX ORDER — HYDRALAZINE HYDROCHLORIDE 20 MG/ML
10 INJECTION INTRAMUSCULAR; INTRAVENOUS
Status: COMPLETED | OUTPATIENT
Start: 2023-01-31 | End: 2023-01-31

## 2023-01-31 RX ORDER — HYDRALAZINE HYDROCHLORIDE 20 MG/ML
10 INJECTION INTRAMUSCULAR; INTRAVENOUS
Status: DISCONTINUED | OUTPATIENT
Start: 2023-01-31 | End: 2023-01-31

## 2023-01-31 RX ORDER — HYDRALAZINE HYDROCHLORIDE 50 MG/1
75 TABLET, FILM COATED ORAL 3 TIMES DAILY
Qty: 90 TABLET | Refills: 1 | Status: ON HOLD | OUTPATIENT
Start: 2023-01-31 | End: 2023-07-25 | Stop reason: HOSPADM

## 2023-01-31 RX ORDER — POTASSIUM CHLORIDE 20 MEQ/1
40 TABLET, EXTENDED RELEASE ORAL ONCE
Status: COMPLETED | OUTPATIENT
Start: 2023-01-31 | End: 2023-01-31

## 2023-01-31 RX ORDER — METOPROLOL SUCCINATE 50 MG/1
50 TABLET, EXTENDED RELEASE ORAL DAILY
Status: DISCONTINUED | OUTPATIENT
Start: 2023-01-31 | End: 2023-01-31 | Stop reason: HOSPADM

## 2023-01-31 RX ADMIN — HYDRALAZINE HYDROCHLORIDE 10 MG: 20 INJECTION INTRAMUSCULAR; INTRAVENOUS at 12:01

## 2023-01-31 RX ADMIN — POTASSIUM CHLORIDE 40 MEQ: 1500 TABLET, EXTENDED RELEASE ORAL at 10:01

## 2023-01-31 RX ADMIN — AMLODIPINE BESYLATE 10 MG: 5 TABLET ORAL at 10:01

## 2023-01-31 RX ADMIN — POLYETHYLENE GLYCOL 3350 17 G: 17 POWDER, FOR SOLUTION ORAL at 09:01

## 2023-01-31 RX ADMIN — AMIODARONE HYDROCHLORIDE 200 MG: 200 TABLET ORAL at 09:01

## 2023-01-31 RX ADMIN — ATORVASTATIN CALCIUM 80 MG: 40 TABLET, FILM COATED ORAL at 09:01

## 2023-01-31 RX ADMIN — HYDRALAZINE HYDROCHLORIDE 50 MG: 25 TABLET, FILM COATED ORAL at 10:01

## 2023-01-31 RX ADMIN — METOPROLOL SUCCINATE 50 MG: 50 TABLET, EXTENDED RELEASE ORAL at 10:01

## 2023-01-31 RX ADMIN — APIXABAN 5 MG: 5 TABLET, FILM COATED ORAL at 09:01

## 2023-01-31 RX ADMIN — ACETAMINOPHEN 650 MG: 325 TABLET ORAL at 10:01

## 2023-01-31 NOTE — ASSESSMENT & PLAN NOTE
Patient with acute kidney injury likely due to IVVD/dehydration ELIZABETH is currently worsening. Labs reviewed- Renal function/electrolytes with Estimated Creatinine Clearance: 23.6 mL/min (A) (based on SCr of 3.4 mg/dL (H)). according to latest data. Monitor urine output and serial BMP and adjust therapy as needed. Avoid nephrotoxins and renally dose meds for GFR listed above.

## 2023-01-31 NOTE — ASSESSMENT & PLAN NOTE
Concerning episode of unresponsiveness in setting ELIZABETH and hypokalemia with known atrial arrhythmia.  Underwent shabnam/DCCV at the beginning of the month which was uneventful.  EF at that time of 65%.  Also noted to have an elevated alcohol level.  Replace electrolytes and monitor on tele.  Monitor electrolytes and renal function.

## 2023-01-31 NOTE — ASSESSMENT & PLAN NOTE
Severe hypokalemia, in setting of syncope, magnesium level pending, cautious replacement given degree of renal failure.  Monitor on tele and monitor labs.

## 2023-01-31 NOTE — DISCHARGE SUMMARY
[facility] Emergency Dept  [facility] Medicine  Discharge Summary      Patient Name: Chato Bowie  MRN: [id number]  RUPESH: [id number]  Patient Class: OP- Observation  Admission Date: 1/30/2023  Hospital Length of Stay: 0 days  Discharge Date and Time:  01/31/2023 5:16 PM  Attending Physician: Gen Ho MD   Discharging Provider: Sierra Hernandez NP  Primary Care Provider: [facility]    Primary Care Team: Networked reference to record PCT     HPI:   72 y.o. male with CAD, hypertension, hyperlipidemia, GERD, chronic diastolic heart failure, typical atrial flutter, DM2, alcohol abuse, CKD stage 4, BPH sent to the ED via EMS from clinic appointment at [facility] at which time he became unresponsive.  Respirations and pulse remained normal our he would not respond to stimuli.  History is somewhat limited as he does not recall the event.  He does report chest pain, acute onset, located mid chest, does not radiate, no known exacerbating or alleviating factors.  Denies fever, chills, cough, SOB, palpitations, dizziness, nausea, vomiting, diarrhea, abdominal pain.  In the ED, alcohol level 128, BNP and troponin minimally elevated, lipase 121.  Significant hypokalemia noted, K +2.7, magnesium level pending.  Renal function also noted to be acutely worse than baseline.  Given potassium replacement.  Placed in observation for syncope evaluation.      * No surgery found *      Hospital Course:   Admission to observation for syncope secondary to volume depletion and abnormal electrolytes.  Alcohol level elevated on admission.  Potassium repleted.  Elevated troponin trend flat in the setting of CKD.  Renal function close to baseline.  No further episode of syncope or chest pain during observation stay.  Noted uncontrolled blood pressure- systolic blood pressure 230 that improved with restart of home medication and p.r.n..  Increase home hydralazine 50-75 t.i.d. No chest pain,no shortness of breath, no change in vision,  and no headaches on discharge. Patient ready for discharge home with close follow-up with [facility].  Encourage stop drinking alcohol.       Goals of Care Treatment Preferences:  Code Status: Full Code      Consults:     No new Assessment & Plan notes have been filed under this hospital service since the last note was generated.  Service: Hospital Medicine    Final Active Diagnoses:    Diagnosis Date Noted POA    PRINCIPAL PROBLEM:  Syncope [R55] 05/24/2021 Yes    Chronic diastolic heart failure [I50.32] 05/12/2022 Yes     Chronic    ELIZABETH (acute kidney injury) [N17.9] 05/24/2021 Yes    CKD (chronic kidney disease), stage IV [N18.4] 03/23/2021 Yes     Chronic    Paroxysmal atrial flutter [I48.92] 12/07/2020 Yes     Chronic    BPH (benign prostatic hyperplasia) [N40.0] 11/24/2020 Yes     Chronic    Hypokalemia [E87.6] 02/14/2019 Yes    Essential hypertension [I10] 03/10/2017 Yes     Chronic    Hyperlipidemia [E78.5] 03/10/2017 Yes     Chronic    Type 2 diabetes mellitus with kidney complication, with long-term current use of insulin [E11.29, Z79.4] 03/10/2017 Not Applicable     Chronic    CAD (coronary artery disease) [I25.10] 03/08/2016 Yes     Chronic      Problems Resolved During this Admission:       Discharged Condition: good    Disposition: Home or Self Care    Follow Up:   Follow-up Information       [facility] Follow up on 2/3/2023.    Why: Appointment scheduled for 10:50am  Contact information:  [address]  [place] [address]  [phone]                           Patient Instructions:      Diet Cardiac     Notify your health care provider if you experience any of the following:  temperature >100.4     Notify your health care provider if you experience any of the following:  difficulty breathing or increased cough     Notify your health care provider if you experience any of the following:  increased confusion or weakness     Activity as tolerated       Significant Diagnostic Studies: Labs: All labs  within the past 24 hours have been reviewed    Pending Diagnostic Studies:       None           Medications:  Reconciled Home Medications:      Medication List        CHANGE how you take these medications      hydrALAZINE 50 MG tablet  Commonly known as: APRESOLINE  Take 1.5 tablets (75 mg total) by mouth 3 (three) times daily.  What changed:   medication strength  how much to take  when to take this            CONTINUE taking these medications      albuterol 90 mcg/actuation inhaler  Commonly known as: PROVENTIL/VENTOLIN HFA  Inhale 2 puffs into the lungs every 4 (four) hours as needed.     amiodarone 200 MG Tab  Commonly known as: PACERONE  Take 2 tablets (400 mg total) by mouth 2 (two) times daily for 7 days, THEN 1 tablet (200 mg total) 2 (two) times daily for 7 days, THEN 1 tablet (200 mg total) once daily.  Start taking on: January 6, 2023     amLODIPine 10 MG tablet  Commonly known as: NORVASC  Take 10 mg by mouth Daily.     ELIQUIS 5 mg Tab  Generic drug: apixaban  Take 5 mg by mouth 2 (two) times daily.     furosemide 80 MG tablet  Commonly known as: LASIX  Take 1 tablet (80 mg total) by mouth 2 (two) times daily.     insulin aspart U-100 100 unit/mL (3 mL) Inpn pen  Commonly known as: NovoLOG  Inject 5 Units into the skin 3 (three) times daily.     losartan-hydrochlorothiazide 50-12.5 mg 50-12.5 mg per tablet  Commonly known as: HYZAAR  Take 1 tablet by mouth once daily.     magnesium oxide 400 mg (241.3 mg magnesium) tablet  Commonly known as: MAG-OX  Take 800 mg by mouth.     metOLazone 5 MG tablet  Commonly known as: ZAROXOLYN  TAKE 1 TABLET BY MOUTH 3 TIMES A WEEK AS NEEDED FOR SEVERE LEG SWELLING     metoprolol succinate 50 MG 24 hr tablet  Commonly known as: TOPROL-XL  Take 1 tablet (50 mg total) by mouth once daily.     multivitamin Tab  Take 1 tablet by mouth once daily.     NovoLIN 70/30 U-100 Insulin 100 unit/mL (70-30) injection  Generic drug: insulin NPH-insulin regular (70/30)  Inject 55 Units  into the skin 2 (two) times daily.     potassium chloride SA 20 MEQ tablet  Commonly known as: K-DUR,KLOR-CON  Take 2 tablets by mouth once daily.     rosuvastatin 20 MG tablet  Commonly known as: CRESTOR  Take 20 mg by mouth once daily.     sildenafiL 100 MG tablet  Commonly known as: VIAGRA  TAKE 1 TABLET BY MOUTH ONCE DAILY AS NEEDED FOR ERECTILE DYSFUNCTION.     tamsulosin 0.4 mg Cap  Commonly known as: FLOMAX  Take 1 capsule (0.4 mg total) by mouth once daily.              Indwelling Lines/Drains at time of discharge:   Lines/Drains/Airways       None                   Time spent on the discharge of patient: 30 minutes       Sierra Hernandez NP  Department of Hospital Medicine  [facility] - Emergency Dept

## 2023-01-31 NOTE — HPI
72 y.o. male with CAD, hypertension, hyperlipidemia, GERD, chronic diastolic heart failure, typical atrial flutter, DM2, alcohol abuse, CKD stage 4, BPH sent to the ED via EMS from clinic appointment at [facility] at which time he became unresponsive.  Respirations and pulse remained normal our he would not respond to stimuli.  History is somewhat limited as he does not recall the event.  He does report chest pain, acute onset, located mid chest, does not radiate, no known exacerbating or alleviating factors.  Denies fever, chills, cough, SOB, palpitations, dizziness, nausea, vomiting, diarrhea, abdominal pain.  In the ED, alcohol level 128, BNP and troponin minimally elevated, lipase 121.  Significant hypokalemia noted, K +2.7, magnesium level pending.  Renal function also noted to be acutely worse than baseline.  Given potassium replacement.  Placed in observation for syncope evaluation.

## 2023-01-31 NOTE — PLAN OF CARE
[facility] - Emergency Dept  Discharge Assessment    SW completed assessment and discussed discharge planning with patient at his bedside. Patient stated that he lives alone and he does not really have a support system. Patient does not have anyone to provide transportation for him to get home when discharge from the hospital and would like to have transportation set up for him.    Primary Care Provider: Irlanda Montes [facility]     Discharge Assessment (most recent)       BRIEF DISCHARGE ASSESSMENT - 01/1950          Discharge Planning    Assessment Type Discharge Planning Brief Assessment     Resource/Environmental Concerns none     Support Systems Family members     Equipment Currently Used at Home none     Current Living Arrangements home     Patient/Family Anticipates Transition to home     Patient/Family Anticipated Services at Transition none     DME Needed Upon Discharge  none     Discharge Plan A Home     Discharge Plan B Home

## 2023-01-31 NOTE — SUBJECTIVE & OBJECTIVE
Past Medical History:   Diagnosis Date    Acute congestive heart failure 3/20/2020    Alcohol abuse     Arthritis     Asthma attack 11/24/2021    Coronary artery disease     Diabetes mellitus     Hyperlipemia     Hypertension     Rhabdomyolysis        Past Surgical History:   Procedure Laterality Date    EYE SURGERY      TRANSESOPHAGEAL ECHOCARDIOGRAM WITH POSSIBLE CARDIOVERSION (MARIA W/ POSS CARDIOVERSION) N/A 1/5/2023    Procedure: Transesophageal echo (MARIA) intra-procedure log documentation;  Surgeon: Indra Foote MD;  Location: [facility] CATH LAB;  Service: Cardiology;  Laterality: N/A;    TREATMENT OF CARDIAC ARRHYTHMIA N/A 12/7/2020    Procedure: Cardioversion or Defibrillation;  Surgeon: Indra Foote MD;  Location: [facility] CATH LAB;  Service: Cardiology;  Laterality: N/A;    TREATMENT OF CARDIAC ARRHYTHMIA N/A 12/30/2020    Procedure: Cardioversion or Defibrillation;  Surgeon: Tyrone Steen MD;  Location: [facility] CATH LAB;  Service: Cardiology;  Laterality: N/A;    TREATMENT OF CARDIAC ARRHYTHMIA N/A 1/5/2023    Procedure: Cardioversion or Defibrillation;  Surgeon: Indra Foote MD;  Location: [facility] CATH LAB;  Service: Cardiology;  Laterality: N/A;    VASCULAR SURGERY         Review of patient's allergies indicates:  No Known Allergies    No current facility-administered medications on file prior to encounter.     Current Outpatient Medications on File Prior to Encounter   Medication Sig    albuterol (PROVENTIL/VENTOLIN HFA) 90 mcg/actuation inhaler Inhale 2 puffs into the lungs every 4 (four) hours as needed.    amiodarone (PACERONE) 200 MG Tab Take 2 tablets (400 mg total) by mouth 2 (two) times daily for 7 days, THEN 1 tablet (200 mg total) 2 (two) times daily for 7 days, THEN 1 tablet (200 mg total) once daily.    amLODIPine (NORVASC) 10 MG tablet Take 10 mg by mouth Daily.    ELIQUIS 5 mg Tab Take 5 mg by mouth 2 (two) times daily.    furosemide (LASIX) 80 MG tablet Take 1 tablet (80 mg total)  by mouth 2 (two) times daily.    hydrALAZINE (APRESOLINE) 25 MG tablet Take 2 tablets (50 mg total) by mouth every 8 (eight) hours.    insulin aspart U-100 (NOVOLOG) 100 unit/mL (3 mL) InPn pen Inject 5 Units into the skin 3 (three) times daily.    losartan-hydrochlorothiazide 50-12.5 mg (HYZAAR) 50-12.5 mg per tablet Take 1 tablet by mouth once daily.    magnesium oxide (MAG-OX) 400 mg (241.3 mg magnesium) tablet Take 800 mg by mouth.    metOLazone (ZAROXOLYN) 5 MG tablet TAKE 1 TABLET BY MOUTH 3 TIMES A WEEK AS NEEDED FOR SEVERE LEG SWELLING    metoprolol succinate (TOPROL-XL) 50 MG 24 hr tablet Take 1 tablet (50 mg total) by mouth once daily.    multivitamin Tab Take 1 tablet by mouth once daily.    NOVOLIN 70/30 U-100 INSULIN 100 unit/mL (70-30) injection Inject 55 Units into the skin 2 (two) times daily.    potassium chloride SA (K-DUR,KLOR-CON) 20 MEQ tablet Take 2 tablets by mouth once daily.    rosuvastatin (CRESTOR) 20 MG tablet Take 20 mg by mouth once daily.    sildenafiL (VIAGRA) 100 MG tablet TAKE 1 TABLET BY MOUTH ONCE DAILY AS NEEDED FOR ERECTILE DYSFUNCTION.    tamsulosin (FLOMAX) 0.4 mg Cap Take 1 capsule (0.4 mg total) by mouth once daily.     Family History       Problem Relation (Age of Onset)    Diabetes Mother, Father, Sister    Hypertension Mother, Father, Sister          Tobacco Use    Smoking status: Never    Smokeless tobacco: Never   Substance and Sexual Activity    Alcohol use: Not Currently     Alcohol/week: 4.0 standard drinks     Types: 4 Cans of beer per week    Drug use: No    Sexual activity: Yes     Partners: Female     Review of Systems   Constitutional:  Negative for chills and fever.   HENT:  Negative for congestion and rhinorrhea.    Eyes:  Negative for photophobia and visual disturbance.   Respiratory:  Negative for cough and shortness of breath.    Cardiovascular:  Positive for chest pain. Negative for palpitations and leg swelling.   Gastrointestinal:  Negative for abdominal  pain, diarrhea, nausea and vomiting.   Genitourinary:  Negative for frequency, hematuria and urgency.   Skin:  Negative for pallor, rash and wound.   Neurological:  Positive for syncope. Negative for light-headedness and headaches.   Psychiatric/Behavioral:  Negative for confusion and decreased concentration.    Objective:     Vital Signs (Most Recent):  Temp: 98 °F (36.7 °C) (01/30/23 2029)  Pulse: (!) 57 (01/30/23 2010)  Resp: 20 (01/30/23 2010)  BP: (!) 165/75 (01/30/23 2003)  SpO2: 100 % (01/30/23 2010)   Vital Signs (24h Range):  Temp:  [97.9 °F (36.6 °C)-98 °F (36.7 °C)] 98 °F (36.7 °C)  Pulse:  [56-64] 57  Resp:  [17-30] 20  SpO2:  [100 %] 100 %  BP: (158-196)/(73-89) 165/75     Weight: 99.8 kg (220 lb)  Body mass index is 30.68 kg/m².    Physical Exam  Vitals and nursing note reviewed.   Constitutional:       General: He is not in acute distress.     Appearance: He is well-developed.   HENT:      Head: Normocephalic and atraumatic.      Right Ear: External ear normal.      Left Ear: External ear normal.      Nose: Nose normal.   Eyes:      Conjunctiva/sclera: Conjunctivae normal.      Pupils: Pupils are equal, round, and reactive to light.   Cardiovascular:      Rate and Rhythm: Normal rate and regular rhythm.   Pulmonary:      Effort: Pulmonary effort is normal. No respiratory distress.      Breath sounds: Normal breath sounds. No wheezing or rales.   Abdominal:      General: Bowel sounds are normal. There is no distension.      Palpations: Abdomen is soft.      Tenderness: There is no abdominal tenderness.      Comments: No palpable hepatomegaly or splenomegaly   Musculoskeletal:         General: No tenderness. Normal range of motion.      Cervical back: Normal range of motion and neck supple.   Skin:     General: Skin is warm and dry.   Neurological:      Mental Status: He is alert and oriented to person, place, and time.   Psychiatric:         Thought Content: Thought content normal.         CRANIAL  NERVES     CN III, IV, VI   Pupils are equal, round, and reactive to light.     Significant Labs: All pertinent labs within the past 24 hours have been reviewed.    Significant Imaging: I have reviewed all pertinent imaging results/findings within the past 24 hours.

## 2023-01-31 NOTE — H&P
[facility]  History & Physical    Patient Name: Chato Bowie  MRN: [id number]  Patient Class: OP- Observation  Admission Date: 1/30/2023  Attending Physician: Abhijeet Veloz MD   Primary Care Provider: [facility]         Patient information was obtained from patient, past medical records and ER records.     Subjective:     Principal Problem:Syncope    Chief Complaint:   Chief Complaint   Patient presents with    Fatigue     Pt sent from [facility] for lethargy, pt went unresponsive according to staff, cbg 158 abnormal ekg, reports chest pain and weakness midsternal         HPI: 72 y.o. male with CAD, hypertension, hyperlipidemia, GERD, chronic diastolic heart failure, typical atrial flutter, DM2, alcohol abuse, CKD stage 4, BPH sent to the ED via EMS from clinic appointment at [facility] at which time he became unresponsive.  Respirations and pulse remained normal our he would not respond to stimuli.  History is somewhat limited as he does not recall the event.  He does report chest pain, acute onset, located mid chest, does not radiate, no known exacerbating or alleviating factors.  Denies fever, chills, cough, SOB, palpitations, dizziness, nausea, vomiting, diarrhea, abdominal pain.  In the ED, alcohol level 128, BNP and troponin minimally elevated, lipase 121.  Significant hypokalemia noted, K +2.7, magnesium level pending.  Renal function also noted to be acutely worse than baseline.  Given potassium replacement.  Placed in observation for syncope evaluation.      Past Medical History:   Diagnosis Date    Acute congestive heart failure 3/20/2020    Alcohol abuse     Arthritis     Asthma attack 11/24/2021    Coronary artery disease     Diabetes mellitus     Hyperlipemia     Hypertension     Rhabdomyolysis        Past Surgical History:   Procedure Laterality Date    EYE SURGERY      TRANSESOPHAGEAL ECHOCARDIOGRAM WITH POSSIBLE CARDIOVERSION (MARIA W/ POSS  CARDIOVERSION) N/A 1/5/2023    Procedure: Transesophageal echo (MARIA) intra-procedure log documentation;  Surgeon: Indra Foote MD;  Location: [facility] CATH LAB;  Service: Cardiology;  Laterality: N/A;    TREATMENT OF CARDIAC ARRHYTHMIA N/A 12/7/2020    Procedure: Cardioversion or Defibrillation;  Surgeon: Indra Foote MD;  Location: [facility] CATH LAB;  Service: Cardiology;  Laterality: N/A;    TREATMENT OF CARDIAC ARRHYTHMIA N/A 12/30/2020    Procedure: Cardioversion or Defibrillation;  Surgeon: Tyrone Steen MD;  Location: [facility] CATH LAB;  Service: Cardiology;  Laterality: N/A;    TREATMENT OF CARDIAC ARRHYTHMIA N/A 1/5/2023    Procedure: Cardioversion or Defibrillation;  Surgeon: Indra Foote MD;  Location: [facility] CATH LAB;  Service: Cardiology;  Laterality: N/A;    VASCULAR SURGERY         Review of patient's allergies indicates:  No Known Allergies    No current facility-administered medications on file prior to encounter.     Current Outpatient Medications on File Prior to Encounter   Medication Sig    albuterol (PROVENTIL/VENTOLIN HFA) 90 mcg/actuation inhaler Inhale 2 puffs into the lungs every 4 (four) hours as needed.    amiodarone (PACERONE) 200 MG Tab Take 2 tablets (400 mg total) by mouth 2 (two) times daily for 7 days, THEN 1 tablet (200 mg total) 2 (two) times daily for 7 days, THEN 1 tablet (200 mg total) once daily.    amLODIPine (NORVASC) 10 MG tablet Take 10 mg by mouth Daily.    ELIQUIS 5 mg Tab Take 5 mg by mouth 2 (two) times daily.    furosemide (LASIX) 80 MG tablet Take 1 tablet (80 mg total) by mouth 2 (two) times daily.    hydrALAZINE (APRESOLINE) 25 MG tablet Take 2 tablets (50 mg total) by mouth every 8 (eight) hours.    insulin aspart U-100 (NOVOLOG) 100 unit/mL (3 mL) InPn pen Inject 5 Units into the skin 3 (three) times daily.    losartan-hydrochlorothiazide 50-12.5 mg (HYZAAR) 50-12.5 mg per tablet Take 1 tablet by mouth once daily.    magnesium oxide (MAG-OX)  400 mg (241.3 mg magnesium) tablet Take 800 mg by mouth.    metOLazone (ZAROXOLYN) 5 MG tablet TAKE 1 TABLET BY MOUTH 3 TIMES A WEEK AS NEEDED FOR SEVERE LEG SWELLING    metoprolol succinate (TOPROL-XL) 50 MG 24 hr tablet Take 1 tablet (50 mg total) by mouth once daily.    multivitamin Tab Take 1 tablet by mouth once daily.    NOVOLIN 70/30 U-100 INSULIN 100 unit/mL (70-30) injection Inject 55 Units into the skin 2 (two) times daily.    potassium chloride SA (K-DUR,KLOR-CON) 20 MEQ tablet Take 2 tablets by mouth once daily.    rosuvastatin (CRESTOR) 20 MG tablet Take 20 mg by mouth once daily.    sildenafiL (VIAGRA) 100 MG tablet TAKE 1 TABLET BY MOUTH ONCE DAILY AS NEEDED FOR ERECTILE DYSFUNCTION.    tamsulosin (FLOMAX) 0.4 mg Cap Take 1 capsule (0.4 mg total) by mouth once daily.     Family History       Problem Relation (Age of Onset)    Diabetes Mother, Father, Sister    Hypertension Mother, Father, Sister          Tobacco Use    Smoking status: Never    Smokeless tobacco: Never   Substance and Sexual Activity    Alcohol use: Not Currently     Alcohol/week: 4.0 standard drinks     Types: 4 Cans of beer per week    Drug use: No    Sexual activity: Yes     Partners: Female     Review of Systems   Constitutional:  Negative for chills and fever.   HENT:  Negative for congestion and rhinorrhea.    Eyes:  Negative for photophobia and visual disturbance.   Respiratory:  Negative for cough and shortness of breath.    Cardiovascular:  Positive for chest pain. Negative for palpitations and leg swelling.   Gastrointestinal:  Negative for abdominal pain, diarrhea, nausea and vomiting.   Genitourinary:  Negative for frequency, hematuria and urgency.   Skin:  Negative for pallor, rash and wound.   Neurological:  Positive for syncope. Negative for light-headedness and headaches.   Psychiatric/Behavioral:  Negative for confusion and decreased concentration.    Objective:     Vital Signs (Most Recent):  Temp: 98 °F  (36.7 °C) (01/30/23 2029)  Pulse: (!) 57 (01/30/23 2010)  Resp: 20 (01/30/23 2010)  BP: (!) 165/75 (01/30/23 2003)  SpO2: 100 % (01/30/23 2010)   Vital Signs (24h Range):  Temp:  [97.9 °F (36.6 °C)-98 °F (36.7 °C)] 98 °F (36.7 °C)  Pulse:  [56-64] 57  Resp:  [17-30] 20  SpO2:  [100 %] 100 %  BP: (158-196)/(73-89) 165/75     Weight: 99.8 kg (220 lb)  Body mass index is 30.68 kg/m².    Physical Exam  Vitals and nursing note reviewed.   Constitutional:       General: He is not in acute distress.     Appearance: He is well-developed.   HENT:      Head: Normocephalic and atraumatic.      Right Ear: External ear normal.      Left Ear: External ear normal.      Nose: Nose normal.   Eyes:      Conjunctiva/sclera: Conjunctivae normal.      Pupils: Pupils are equal, round, and reactive to light.   Cardiovascular:      Rate and Rhythm: Normal rate and regular rhythm.   Pulmonary:      Effort: Pulmonary effort is normal. No respiratory distress.      Breath sounds: Normal breath sounds. No wheezing or rales.   Abdominal:      General: Bowel sounds are normal. There is no distension.      Palpations: Abdomen is soft.      Tenderness: There is no abdominal tenderness.      Comments: No palpable hepatomegaly or splenomegaly   Musculoskeletal:         General: No tenderness. Normal range of motion.      Cervical back: Normal range of motion and neck supple.   Skin:     General: Skin is warm and dry.   Neurological:      Mental Status: He is alert and oriented to person, place, and time.   Psychiatric:         Thought Content: Thought content normal.         CRANIAL NERVES     CN III, IV, VI   Pupils are equal, round, and reactive to light.     Significant Labs: All pertinent labs within the past 24 hours have been reviewed.    Significant Imaging: I have reviewed all pertinent imaging results/findings within the past 24 hours.    Assessment/Plan:     * Syncope  Concerning episode of unresponsiveness in setting ELIZABETH and hypokalemia  with known atrial arrhythmia.  Underwent shabnam/DCCV at the beginning of the month which was uneventful.  EF at that time of 65%.  Also noted to have an elevated alcohol level.  Replace electrolytes and monitor on tele.  Monitor electrolytes and renal function.    ELIZABETH (acute kidney injury)  Patient with acute kidney injury likely due to IVVD/dehydration ELIZABETH is currently worsening. Labs reviewed- Renal function/electrolytes with Estimated Creatinine Clearance: 23.6 mL/min (A) (based on SCr of 3.4 mg/dL (H)). according to latest data. Monitor urine output and serial BMP and adjust therapy as needed. Avoid nephrotoxins and renally dose meds for GFR listed above.     Hypokalemia  Severe hypokalemia, in setting of syncope, magnesium level pending, cautious replacement given degree of renal failure.  Monitor on tele and monitor labs.    Chronic diastolic heart failure  No evidence of acute decompensation or fluid overload, continue home regimen, I&Os, daily weights.    CKD (chronic kidney disease), stage IV  ELIZABETH superimposed on CKD, management as above.    Paroxysmal atrial flutter  Continue home regimen amiodarone, metoprolol, and apixaban    BPH (benign prostatic hyperplasia)  Continue home regimen of tamsulosin    Type 2 diabetes mellitus with kidney complication, with long-term current use of insulin  Patient's FSGs are controlled on current medication regimen.  Last A1c reviewed-   Lab Results   Component Value Date    HGBA1C 7.5 (H) 01/05/2023     Most recent fingerstick glucose reviewed-   Recent Labs   Lab 01/30/23  1600 01/30/23 2022   POCTGLUCOSE 157* 93     Current correctional scale  Medium  Maintain anti-hyperglycemic dose as follows-   Antihyperglycemics (From admission, onward)    Start     Stop Route Frequency Ordered    01/30/23 1943  insulin aspart U-100 pen 1-10 Units         -- SubQ Before meals & nightly PRN 01/30/23 1847        Hold Oral hypoglycemics while patient is in the  hospital.    Hyperlipidemia  Continue statin    Essential hypertension  Well controlled, continue home medications and monitor blood pressure, adjust as needed.     CAD (coronary artery disease)  Continue home regimen of ARB/BBl/statin      VTE Risk Mitigation (From admission, onward)         Ordered     apixaban tablet 5 mg  2 times daily         01/30/23 1847     Reason for No Pharmacological VTE Prophylaxis  Once        Question:  Reasons:  Answer:  Already adequately anticoagulated on oral Anticoagulants    01/30/23 1847     IP VTE HIGH RISK PATIENT  Once         01/30/23 1847     Place sequential compression device  Until discontinued         01/30/23 1847               As clarification, on 01/30/2023, patient should be placed in hospital observation services under my care in collaboration with MD Alex Hamilton Jr., APRN, [facility]  Hospitalist - Department of Hospital Medicine  [facility] [address] [place] [address]  Office #: [phone]; Pager #: [phone]

## 2023-01-31 NOTE — ASSESSMENT & PLAN NOTE
Patient's FSGs are controlled on current medication regimen.  Last A1c reviewed-   Lab Results   Component Value Date    HGBA1C 7.5 (H) 01/05/2023     Most recent fingerstick glucose reviewed-   Recent Labs   Lab 01/30/23  1600 01/30/23 2022   POCTGLUCOSE 157* 93     Current correctional scale  Medium  Maintain anti-hyperglycemic dose as follows-   Antihyperglycemics (From admission, onward)    Start     Stop Route Frequency Ordered    01/30/23 1943  insulin aspart U-100 pen 1-10 Units         -- SubQ Before meals & nightly PRN 01/30/23 1847        Hold Oral hypoglycemics while patient is in the hospital.

## 2023-01-31 NOTE — PLAN OF CARE
01/31/23 1327   Final Note   Assessment Type Final Discharge Note   Anticipated Discharge Disposition Home   Hospital Resources/Appts/Education Provided Appointments scheduled and added to AVS   Post-Acute Status   Post-Acute Authorization Other   Other Status No Post-Acute Service Needs     Pts nurse Trino notified that the pt can d/c from CM standpoint

## 2023-01-31 NOTE — ASSESSMENT & PLAN NOTE
No evidence of acute decompensation or fluid overload, continue home regimen, I&Os, daily weights.

## 2023-01-31 NOTE — HOSPITAL COURSE
Admission to observation for syncope secondary to volume depletion and abnormal electrolytes.  Alcohol level elevated on admission.  Potassium repleted.  Elevated troponin trend flat in the setting of CKD.  Renal function close to baseline.  No further episode of syncope or chest pain during observation stay.  Noted uncontrolled blood pressure- systolic blood pressure 230 that improved with restart of home medication and p.r.n..  Increase home hydralazine 50-75 t.i.d. No chest pain,no shortness of breath, no change in vision, and no headaches on discharge. Patient ready for discharge home with close follow-up with [facility].  Encourage stop drinking alcohol.

## 2023-05-01 PROBLEM — N17.9 AKI (ACUTE KIDNEY INJURY): Status: RESOLVED | Noted: 2021-05-24 | Resolved: 2023-05-01

## 2023-05-20 ENCOUNTER — HOSPITAL ENCOUNTER (EMERGENCY)
Facility: HOSPITAL | Age: 73
Discharge: HOME | End: 2023-05-20
Attending: EMERGENCY MEDICINE
Payer: MEDICARE

## 2023-05-20 VITALS
SYSTOLIC BLOOD PRESSURE: 153 MMHG | WEIGHT: 210 LBS | BODY MASS INDEX: 29.4 KG/M2 | DIASTOLIC BLOOD PRESSURE: 70 MMHG | HEART RATE: 77 BPM | HEIGHT: 71 IN | RESPIRATION RATE: 18 BRPM | OXYGEN SATURATION: 97 % | TEMPERATURE: 98 F

## 2023-05-20 DIAGNOSIS — M10.9 ACUTE GOUT OF LEFT KNEE, UNSPECIFIED CAUSE: Primary | ICD-10-CM

## 2023-05-20 DIAGNOSIS — M25.469 KNEE SWELLING: ICD-10-CM

## 2023-05-20 DIAGNOSIS — R31.29 MICROSCOPIC HEMATURIA: ICD-10-CM

## 2023-05-20 DIAGNOSIS — R60.9 SWELLING: ICD-10-CM

## 2023-05-20 LAB
ALBUMIN SERPL BCP-MCNC: 3.9 G/DL (ref 3.5–5.2)
ALP SERPL-CCNC: 64 U/L (ref 55–135)
ALT SERPL W/O P-5'-P-CCNC: 29 U/L (ref 10–44)
ANION GAP SERPL CALC-SCNC: 14 MMOL/L (ref 8–16)
APPEARANCE FLD: NORMAL
AST SERPL-CCNC: 23 U/L (ref 10–40)
BACTERIA #/AREA URNS HPF: ABNORMAL /HPF
BASOPHILS # BLD AUTO: 0.05 K/UL (ref 0–0.2)
BASOPHILS NFR BLD: 0.5 % (ref 0–1.9)
BILIRUB SERPL-MCNC: 2 MG/DL (ref 0.1–1)
BILIRUB UR QL STRIP: NEGATIVE
BODY FLD TYPE: NORMAL
BUN SERPL-MCNC: 72 MG/DL (ref 8–23)
CALCIUM SERPL-MCNC: 10.6 MG/DL (ref 8.7–10.5)
CHLORIDE SERPL-SCNC: 96 MMOL/L (ref 95–110)
CLARITY UR: CLEAR
CO2 SERPL-SCNC: 29 MMOL/L (ref 23–29)
COLOR FLD: YELLOW
COLOR UR: YELLOW
CREAT SERPL-MCNC: 2.9 MG/DL (ref 0.5–1.4)
CRP SERPL-MCNC: 247.8 MG/L (ref 0–8.2)
DIFFERENTIAL METHOD: ABNORMAL
EOSINOPHIL # BLD AUTO: 0 K/UL (ref 0–0.5)
EOSINOPHIL NFR BLD: 0.1 % (ref 0–8)
ERYTHROCYTE [DISTWIDTH] IN BLOOD BY AUTOMATED COUNT: 13.2 % (ref 11.5–14.5)
ERYTHROCYTE [SEDIMENTATION RATE] IN BLOOD BY WESTERGREN METHOD: 55 MM/HR (ref 0–10)
EST. GFR  (NO RACE VARIABLE): 22 ML/MIN/1.73 M^2
GLUCOSE SERPL-MCNC: 152 MG/DL (ref 70–110)
GLUCOSE UR QL STRIP: NEGATIVE
HCT VFR BLD AUTO: 42.6 % (ref 40–54)
HGB BLD-MCNC: 14.5 G/DL (ref 14–18)
HGB UR QL STRIP: ABNORMAL
HYALINE CASTS #/AREA URNS LPF: 1 /LPF
IMM GRANULOCYTES # BLD AUTO: 0.04 K/UL (ref 0–0.04)
IMM GRANULOCYTES NFR BLD AUTO: 0.4 % (ref 0–0.5)
KETONES UR QL STRIP: NEGATIVE
KOH PREP SPEC: NORMAL
LEUKOCYTE ESTERASE UR QL STRIP: NEGATIVE
LYMPHOCYTES # BLD AUTO: 0.6 K/UL (ref 1–4.8)
LYMPHOCYTES NFR BLD: 5.9 % (ref 18–48)
MCH RBC QN AUTO: 27.6 PG (ref 27–31)
MCHC RBC AUTO-ENTMCNC: 34 G/DL (ref 32–36)
MCV RBC AUTO: 81 FL (ref 82–98)
MICROSCOPIC COMMENT: ABNORMAL
MONOCYTES # BLD AUTO: 1.5 K/UL (ref 0.3–1)
MONOCYTES NFR BLD: 15.3 % (ref 4–15)
MONOS+MACROS NFR FLD MANUAL: 7 %
NEUTROPHILS # BLD AUTO: 7.8 K/UL (ref 1.8–7.7)
NEUTROPHILS NFR BLD: 77.8 % (ref 38–73)
NEUTROPHILS NFR FLD MANUAL: 93 %
NITRITE UR QL STRIP: NEGATIVE
NRBC BLD-RTO: 0 /100 WBC
PH UR STRIP: 6 [PH] (ref 5–8)
PLATELET # BLD AUTO: 238 K/UL (ref 150–450)
PMV BLD AUTO: 8.6 FL (ref 9.2–12.9)
POTASSIUM SERPL-SCNC: 4.5 MMOL/L (ref 3.5–5.1)
PROT SERPL-MCNC: 8.5 G/DL (ref 6–8.4)
PROT UR QL STRIP: ABNORMAL
RBC # BLD AUTO: 5.26 M/UL (ref 4.6–6.2)
RBC #/AREA URNS HPF: 71 /HPF (ref 0–4)
SODIUM SERPL-SCNC: 139 MMOL/L (ref 136–145)
SP GR UR STRIP: 1.01 (ref 1–1.03)
URATE SERPL-MCNC: 14.1 MG/DL (ref 3.4–7)
URN SPEC COLLECT METH UR: ABNORMAL
UROBILINOGEN UR STRIP-ACNC: NEGATIVE EU/DL
WBC # BLD AUTO: 10.05 K/UL (ref 3.9–12.7)
WBC # FLD: NORMAL /CU MM
WBC #/AREA URNS HPF: 4 /HPF (ref 0–5)

## 2023-05-20 PROCEDURE — 81000 URINALYSIS NONAUTO W/SCOPE: CPT | Performed by: EMERGENCY MEDICINE

## 2023-05-20 PROCEDURE — 87206 SMEAR FLUORESCENT/ACID STAI: CPT | Mod: 91 | Performed by: EMERGENCY MEDICINE

## 2023-05-20 PROCEDURE — 86140 C-REACTIVE PROTEIN: CPT | Performed by: EMERGENCY MEDICINE

## 2023-05-20 PROCEDURE — 87102 FUNGUS ISOLATION CULTURE: CPT | Performed by: EMERGENCY MEDICINE

## 2023-05-20 PROCEDURE — 87210 SMEAR WET MOUNT SALINE/INK: CPT | Performed by: EMERGENCY MEDICINE

## 2023-05-20 PROCEDURE — 85025 COMPLETE CBC W/AUTO DIFF WBC: CPT | Performed by: EMERGENCY MEDICINE

## 2023-05-20 PROCEDURE — 96374 THER/PROPH/DIAG INJ IV PUSH: CPT | Mod: 59

## 2023-05-20 PROCEDURE — 96361 HYDRATE IV INFUSION ADD-ON: CPT | Mod: 59

## 2023-05-20 PROCEDURE — 87205 SMEAR GRAM STAIN: CPT | Performed by: EMERGENCY MEDICINE

## 2023-05-20 PROCEDURE — 87116 MYCOBACTERIA CULTURE: CPT | Performed by: EMERGENCY MEDICINE

## 2023-05-20 PROCEDURE — 20610 DRAIN/INJ JOINT/BURSA W/O US: CPT | Mod: LT

## 2023-05-20 PROCEDURE — 89060 EXAM SYNOVIAL FLUID CRYSTALS: CPT | Performed by: EMERGENCY MEDICINE

## 2023-05-20 PROCEDURE — 89051 BODY FLUID CELL COUNT: CPT | Performed by: EMERGENCY MEDICINE

## 2023-05-20 PROCEDURE — 85652 RBC SED RATE AUTOMATED: CPT | Performed by: EMERGENCY MEDICINE

## 2023-05-20 PROCEDURE — 63600175 PHARM REV CODE 636 W HCPCS: Performed by: EMERGENCY MEDICINE

## 2023-05-20 PROCEDURE — 80053 COMPREHEN METABOLIC PANEL: CPT | Performed by: EMERGENCY MEDICINE

## 2023-05-20 PROCEDURE — 84550 ASSAY OF BLOOD/URIC ACID: CPT | Performed by: EMERGENCY MEDICINE

## 2023-05-20 PROCEDURE — 99285 EMERGENCY DEPT VISIT HI MDM: CPT | Mod: 25

## 2023-05-20 PROCEDURE — 87070 CULTURE OTHR SPECIMN AEROBIC: CPT | Performed by: EMERGENCY MEDICINE

## 2023-05-20 PROCEDURE — 87206 SMEAR FLUORESCENT/ACID STAI: CPT | Performed by: EMERGENCY MEDICINE

## 2023-05-20 PROCEDURE — 25000003 PHARM REV CODE 250: Performed by: EMERGENCY MEDICINE

## 2023-05-20 RX ORDER — PREDNISONE 50 MG/1
50 TABLET ORAL DAILY
Qty: 5 TABLET | Refills: 0 | Status: SHIPPED | OUTPATIENT
Start: 2023-05-20 | End: 2023-05-25

## 2023-05-20 RX ORDER — DEXAMETHASONE SODIUM PHOSPHATE 4 MG/ML
12 INJECTION, SOLUTION INTRA-ARTICULAR; INTRALESIONAL; INTRAMUSCULAR; INTRAVENOUS; SOFT TISSUE
Status: COMPLETED | OUTPATIENT
Start: 2023-05-20 | End: 2023-05-20

## 2023-05-20 RX ORDER — PREDNISONE 50 MG/1
50 TABLET ORAL DAILY
Qty: 5 TABLET | Refills: 0 | Status: SHIPPED | OUTPATIENT
Start: 2023-05-20 | End: 2023-05-20 | Stop reason: SDUPTHER

## 2023-05-20 RX ADMIN — DEXAMETHASONE SODIUM PHOSPHATE 12 MG: 4 INJECTION INTRA-ARTICULAR; INTRALESIONAL; INTRAMUSCULAR; INTRAVENOUS; SOFT TISSUE at 08:05

## 2023-05-20 RX ADMIN — SODIUM CHLORIDE 1000 ML: 0.9 INJECTION, SOLUTION INTRAVENOUS at 08:05

## 2023-05-20 NOTE — ED TRIAGE NOTES
"Pt to the ED with complaints of left knee pain and swelling x3 days that is increasingly getting worse. Pt reports he is usually ambulatory but is unable to walk today due to the pain. Pt states he often has problems with this knee due to an injury "years ago". Obvious swelling noted to left knee. Pt reports 10/10 pain.  "

## 2023-05-20 NOTE — ED PROVIDER NOTES
"Encounter Date: 5/20/2023       History     Chief Complaint   Patient presents with    Knee Pain     Pt to ED via EMS c/o left knee swelling and pain x 2-3 days. Pt shaking from pain in triage. Pt hard of hearing.      72 y.o. male Past Medical History:  3/20/2020: Acute congestive heart failure  No date: Alcohol abuse  No date: Arthritis  11/24/2021: Asthma attack  No date: Coronary artery disease  No date: Diabetes mellitus  No date: Hyperlipemia  No date: Hypertension  No date: Rhabdomyolysis     CKD, dm, htn, hld, prior etoh abuse, Here for evaluation of L knee pain x 2-3 days. Notes that he has had L knee issues "for years" however over the last 2-3 days he has had increased pain/swelling and cannot bear weight. Denies falls/trauma/injuries.    Review of patient's allergies indicates:  No Known Allergies  Past Medical History:   Diagnosis Date    Acute congestive heart failure 3/20/2020    Alcohol abuse     Arthritis     Asthma attack 11/24/2021    Coronary artery disease     Diabetes mellitus     Hyperlipemia     Hypertension     Rhabdomyolysis      Past Surgical History:   Procedure Laterality Date    EYE SURGERY      TRANSESOPHAGEAL ECHOCARDIOGRAM WITH POSSIBLE CARDIOVERSION (MARIA W/ POSS CARDIOVERSION) N/A 1/5/2023    Procedure: Transesophageal echo (MARIA) intra-procedure log documentation;  Surgeon: Indra Foote MD;  Location: [facility] CATH LAB;  Service: Cardiology;  Laterality: N/A;    TREATMENT OF CARDIAC ARRHYTHMIA N/A 12/7/2020    Procedure: Cardioversion or Defibrillation;  Surgeon: Indra Foote MD;  Location: [facility] CATH LAB;  Service: Cardiology;  Laterality: N/A;    TREATMENT OF CARDIAC ARRHYTHMIA N/A 12/30/2020    Procedure: Cardioversion or Defibrillation;  Surgeon: Tyrone Steen MD;  Location: [facility] CATH LAB;  Service: Cardiology;  Laterality: N/A;    TREATMENT OF CARDIAC ARRHYTHMIA N/A 1/5/2023    Procedure: Cardioversion or Defibrillation;  Surgeon: Indra Foote MD;  Location: " [facility] CATH LAB;  Service: Cardiology;  Laterality: N/A;    VASCULAR SURGERY       Family History   Problem Relation Age of Onset    Hypertension Mother     Diabetes Mother     Diabetes Father     Hypertension Father     Diabetes Sister     Hypertension Sister      Social History     Tobacco Use    Smoking status: Never    Smokeless tobacco: Never   Substance Use Topics    Alcohol use: Not Currently     Alcohol/week: 4.0 standard drinks     Types: 4 Cans of beer per week    Drug use: No     Review of Systems   Constitutional:  Negative for fever.   HENT:  Negative for sore throat.    Respiratory:  Negative for shortness of breath.    Cardiovascular:  Negative for chest pain.   Gastrointestinal:  Negative for nausea.   Genitourinary:  Negative for dysuria.   Musculoskeletal:  Negative for back pain.   Skin:  Negative for rash.   Neurological:  Negative for weakness.   Hematological:  Does not bruise/bleed easily.   All other systems reviewed and are negative.    Physical Exam     Initial Vitals [05/20/23 1750]   BP Pulse Resp Temp SpO2   (!) 162/78 77 18 98.2 °F (36.8 °C) 96 %      MAP       --         Physical Exam    Nursing note and vitals reviewed.  Constitutional: He appears well-developed and well-nourished.   HENT:   Head: Normocephalic and atraumatic.   Eyes: EOM are normal. Pupils are equal, round, and reactive to light.   Cardiovascular:  Normal rate and regular rhythm.           Pulmonary/Chest: Effort normal.   Abdominal: He exhibits no distension.   Musculoskeletal:         General: No tenderness or edema.     Neurological: He is alert and oriented to person, place, and time. No cranial nerve deficit.   Skin: Skin is warm and dry.   Psychiatric: He has a normal mood and affect.   L knee: edematous, warm, +effusion, no pain with micro motion    ED Course   Arthrocentesis    Date/Time: 5/20/2023 6:11 PM  Location procedure was performed: [facility] EMERGENCY DEPARTMENT  Performed by: Norma Ling,  MD  Authorized by: Norma Ling MD   Pre-op diagnosis: joint swelling  Consent Done: Emergent Situation  Indications: joint swelling   Body area: knee  Joint: left knee  Local anesthesia used: no    Anesthesia:  Local anesthesia used: no    Patient sedated: no  Preparation: Patient was prepped and draped in the usual sterile fashion.  Needle size: 18 G  Approach: lateral  Aspirate amount: 20 mL  Aspirate: yellow  Patient tolerance: Patient tolerated the procedure well with no immediate complications  Complications: No  Specimens: Yes  Implants: No    MEDICAL DECISION MAKING    After review of the patient's physical exam, ED testing, and history/symptoms, relevant labs, imaging, available outside records  a wide differential was considered including but not limited to: infectious, traumatic, vascular, toxicological , metabolic, malignant, ischemic, embolic, psychological, genetic, iatrogenic, idiopathic, medication reaction, substance dependence/intoxication/withdrawal, electrolyte or blood dyscrasia, and other etiologies.        labs/imaging/interventions include:       Medications   sodium chloride 0.9% bolus 1,000 mL 1,000 mL (1,000 mLs Intravenous New Bag 5/20/23 2042)   dexAMETHasone injection 12 mg (12 mg Intravenous Given 5/20/23 2041)     Labs Reviewed   CBC W/ AUTO DIFFERENTIAL - Abnormal; Notable for the following components:       Result Value    MCV 81 (*)     MPV 8.6 (*)     Gran # (ANC) 7.8 (*)     Lymph # 0.6 (*)     Mono # 1.5 (*)     Gran % 77.8 (*)     Lymph % 5.9 (*)     Mono % 15.3 (*)     All other components within normal limits   COMPREHENSIVE METABOLIC PANEL - Abnormal; Notable for the following components:    Glucose 152 (*)     BUN 72 (*)     Creatinine 2.9 (*)     Calcium 10.6 (*)     Total Protein 8.5 (*)     Total Bilirubin 2.0 (*)     eGFR 22 (*)     All other components within normal limits   SEDIMENTATION RATE - Abnormal; Notable for the following components:    Sed Rate 55  (*)     All other components within normal limits   C-REACTIVE PROTEIN - Abnormal; Notable for the following components:    .8 (*)     All other components within normal limits   URIC ACID - Abnormal; Notable for the following components:    Uric Acid 14.1 (*)     All other components within normal limits   URINALYSIS, REFLEX TO URINE CULTURE - Abnormal; Notable for the following components:    Protein, UA 3+ (*)     Occult Blood UA 3+ (*)     All other components within normal limits    Narrative:     Specimen Source->Urine   URINALYSIS MICROSCOPIC - Abnormal; Notable for the following components:    RBC, UA 71 (*)     All other components within normal limits    Narrative:     Specimen Source->Urine   KENNEDY PREP    Narrative:     Knee left   CULTURE, FLUID  (AEROBIC) WITH GRAM STAIN   AFB CULTURE & SMEAR   DIRECT AFB STAIN   CULTURE, FUNGUS   WBC & DIFF, BODY FLUID   BODY FLUID CRYSTAL      X-Ray Knee 3 View Left   Final Result      No acute osseous abnormality identified.  DJD, remote bony infarcts, and large suprapatellar joint effusion.  Overall no significant change from previous exam March 2020.         Electronically signed by: Mine Dasilva MD   Date:    05/20/2023   Time:    19:23      US Lower Extremity Veins Left   Final Result      No evidence of left lower extremity deep venous thrombosis.      Left Baker's cyst.         Electronically signed by: Mine Dasilva MD   Date:    05/20/2023   Time:    19:10            Labs Reviewed   CBC W/ AUTO DIFFERENTIAL - Abnormal; Notable for the following components:       Result Value    MCV 81 (*)     MPV 8.6 (*)     Gran # (ANC) 7.8 (*)     Lymph # 0.6 (*)     Mono # 1.5 (*)     Gran % 77.8 (*)     Lymph % 5.9 (*)     Mono % 15.3 (*)     All other components within normal limits   COMPREHENSIVE METABOLIC PANEL - Abnormal; Notable for the following components:    Glucose 152 (*)     BUN 72 (*)     Creatinine 2.9 (*)     Calcium 10.6 (*)     Total Protein 8.5  (*)     Total Bilirubin 2.0 (*)     eGFR 22 (*)     All other components within normal limits   SEDIMENTATION RATE - Abnormal; Notable for the following components:    Sed Rate 55 (*)     All other components within normal limits   C-REACTIVE PROTEIN - Abnormal; Notable for the following components:    .8 (*)     All other components within normal limits   URIC ACID - Abnormal; Notable for the following components:    Uric Acid 14.1 (*)     All other components within normal limits   URINALYSIS, REFLEX TO URINE CULTURE - Abnormal; Notable for the following components:    Protein, UA 3+ (*)     Occult Blood UA 3+ (*)     All other components within normal limits    Narrative:     Specimen Source->Urine   URINALYSIS MICROSCOPIC - Abnormal; Notable for the following components:    RBC, UA 71 (*)     All other components within normal limits    Narrative:     Specimen Source->Urine   KENNEDY PREP    Narrative:     Knee left   CULTURE, FLUID  (AEROBIC) WITH GRAM STAIN   AFB CULTURE & SMEAR   DIRECT AFB STAIN   CULTURE, FUNGUS   WBC & DIFF, BODY FLUID   BODY FLUID CRYSTAL          Imaging Results              X-Ray Knee 3 View Left (Final result)  Result time 05/20/23 19:23:51      Final result by Mine Dasilva MD (05/20/23 19:23:51)                   Impression:      No acute osseous abnormality identified.  DJD, remote bony infarcts, and large suprapatellar joint effusion.  Overall no significant change from previous exam March 2020.      Electronically signed by: Mine Dasilva MD  Date:    05/20/2023  Time:    19:23               Narrative:    EXAMINATION:  XR KNEE 3 VIEW LEFT    CLINICAL HISTORY:  Edema, unspecified    TECHNIQUE:  AP, lateral, and Merchant views of the left knee were performed.    COMPARISON:  March 2020.    FINDINGS:  No evidence of acute displaced fracture or dislocation.  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  There is medial and tibiofemoral  compartment DJD with joint space narrowing seen most pronounced involving the medial compartment.  Large suprapatellar joint effusion is seen.  Diffuse vascular calcifications are seen.                                       US Lower Extremity Veins Left (Final result)  Result time 05/20/23 19:10:50      Final result by Mine Dasilva MD (05/20/23 19:10:50)                   Impression:      No evidence of left lower extremity deep venous thrombosis.    Left Baker's cyst.      Electronically signed by: Mine Dasilva MD  Date:    05/20/2023  Time:    19:10               Narrative:    EXAMINATION:  US LOWER EXTREMITY VEINS LEFT    CLINICAL HISTORY:  Edema, unspecified    TECHNIQUE:  Duplex and color flow Doppler evaluation of the left lower extremity veins was performed.    COMPARISON:  February 2022.    FINDINGS:  No evidence of clot involving the bilateral common femoral veins or left greater saphenous, femoral, popliteal, peroneal, anterior and posterior tibial veins.  All venous structures demonstrate normal respiratory phasicity and augment adequately.  Left-sided Baker's cyst is visualized.                                       Medications   sodium chloride 0.9% bolus 1,000 mL 1,000 mL (1,000 mLs Intravenous New Bag 5/20/23 2042)   dexAMETHasone injection 12 mg (12 mg Intravenous Given 5/20/23 2041)         BUN elevation noted. Have written for IVF.   Uric acid 14 awaiting gram stain/crystal analysis. Pt has CKD, have written for steroids instead of NSAIDS    26K wbc in joint aspirate noted. Gram stain neg, and no organisms seen.  With uric acid elevation presentation c/w gout.     Pt with micro hematuria. Have referred to urology    Will write for 1 week prednisone . Will avoid nsaids due to his level of renal impairment.           I have independently evaluated and interpreted all available labs and imaging to the extent of the scope of my practice.  The suspected diagnosis, treatment and plan were  discussed with the patient. All questions or concerns have been addressed.    Note was created using voice recognition software. Note may have occasional typographical or grammatical errors , garbled syntax, and other bizarre constructions that may not have been identified and edited despite good sal initial review prior to signing.             Clinical Impression:   Final diagnoses:  [R60.9] Swelling  [R31.29] Microscopic hematuria (Primary)  [M25.469] Knee swelling  [M10.9] Acute gout of left knee, unspecified cause        ED Disposition Condition    Discharge Stable          ED Prescriptions    None       Follow-up Information       Follow up With Specialties Details Why Contact Info Additional Information    [facility]   [address]  [facility] [address] [phone] Please park in garage or [facility] surface lot and check in at [facility] [address].              Norma Ling MD  05/20/23 2131       Norma Ling MD  05/20/23 2134

## 2023-05-21 LAB — ACID FAST MOD KINY STN SPEC: NORMAL

## 2023-05-21 NOTE — DISCHARGE INSTRUCTIONS

## 2023-05-22 LAB
BODY FLD TYPE: ABNORMAL
CRYSTALS FLD MICRO: POSITIVE

## 2023-05-24 LAB
BACTERIA FLD AEROBE CULT: NO GROWTH
GRAM STN SPEC: NORMAL
GRAM STN SPEC: NORMAL

## 2023-05-30 ENCOUNTER — HOSPITAL ENCOUNTER (EMERGENCY)
Facility: HOSPITAL | Age: 73
Discharge: HOME | End: 2023-05-30
Attending: EMERGENCY MEDICINE
Payer: MEDICARE

## 2023-05-30 VITALS
BODY MASS INDEX: 29.4 KG/M2 | DIASTOLIC BLOOD PRESSURE: 81 MMHG | HEIGHT: 71 IN | WEIGHT: 210 LBS | SYSTOLIC BLOOD PRESSURE: 176 MMHG | TEMPERATURE: 98 F | OXYGEN SATURATION: 95 % | HEART RATE: 80 BPM | RESPIRATION RATE: 19 BRPM

## 2023-05-30 DIAGNOSIS — M25.569 KNEE PAIN, UNSPECIFIED CHRONICITY, UNSPECIFIED LATERALITY: ICD-10-CM

## 2023-05-30 DIAGNOSIS — T14.90XA TRAUMA: ICD-10-CM
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
## 2023-05-30 LAB
ALBUMIN SERPL BCP-MCNC: 3.1 G/DL (ref 3.5–5.2)
ALP SERPL-CCNC: 51 U/L (ref 55–135)
ALT SERPL W/O P-5'-P-CCNC: 18 U/L (ref 10–44)
ANION GAP SERPL CALC-SCNC: 11 MMOL/L (ref 8–16)
AST SERPL-CCNC: 14 U/L (ref 10–40)
BACTERIA #/AREA URNS HPF: ABNORMAL /HPF
BASOPHILS # BLD AUTO: 0.02 K/UL (ref 0–0.2)
BASOPHILS NFR BLD: 0.2 % (ref 0–1.9)
BILIRUB SERPL-MCNC: 1.3 MG/DL (ref 0.1–1)
BILIRUB UR QL STRIP: NEGATIVE
BUN SERPL-MCNC: 83 MG/DL (ref 8–23)
CALCIUM SERPL-MCNC: 9.5 MG/DL (ref 8.7–10.5)
CHLORIDE SERPL-SCNC: 94 MMOL/L (ref 95–110)
CLARITY UR: CLEAR
CO2 SERPL-SCNC: 28 MMOL/L (ref 23–29)
COLOR UR: YELLOW
CREAT SERPL-MCNC: 2.6 MG/DL (ref 0.5–1.4)
CTP QC/QA: YES
CTP QC/QA: YES
DIFFERENTIAL METHOD: ABNORMAL
EOSINOPHIL # BLD AUTO: 0 K/UL (ref 0–0.5)
EOSINOPHIL NFR BLD: 0.1 % (ref 0–8)
ERYTHROCYTE [DISTWIDTH] IN BLOOD BY AUTOMATED COUNT: 13.3 % (ref 11.5–14.5)
EST. GFR  (NO RACE VARIABLE): 25 ML/MIN/1.73 M^2
GLUCOSE SERPL-MCNC: 237 MG/DL (ref 70–110)
GLUCOSE UR QL STRIP: ABNORMAL
HCT VFR BLD AUTO: 36.7 % (ref 40–54)
HGB BLD-MCNC: 12.7 G/DL (ref 14–18)
HGB UR QL STRIP: ABNORMAL
HYALINE CASTS #/AREA URNS LPF: 1 /LPF
IMM GRANULOCYTES # BLD AUTO: 0.15 K/UL (ref 0–0.04)
IMM GRANULOCYTES NFR BLD AUTO: 1.2 % (ref 0–0.5)
KETONES UR QL STRIP: NEGATIVE
LEUKOCYTE ESTERASE UR QL STRIP: NEGATIVE
LYMPHOCYTES # BLD AUTO: 0.6 K/UL (ref 1–4.8)
LYMPHOCYTES NFR BLD: 4.9 % (ref 18–48)
MCH RBC QN AUTO: 27.7 PG (ref 27–31)
MCHC RBC AUTO-ENTMCNC: 34.6 G/DL (ref 32–36)
MCV RBC AUTO: 80 FL (ref 82–98)
MICROSCOPIC COMMENT: ABNORMAL
MONOCYTES # BLD AUTO: 2.1 K/UL (ref 0.3–1)
MONOCYTES NFR BLD: 16.8 % (ref 4–15)
NEUTROPHILS # BLD AUTO: 9.6 K/UL (ref 1.8–7.7)
NEUTROPHILS NFR BLD: 76.8 % (ref 38–73)
NITRITE UR QL STRIP: NEGATIVE
NRBC BLD-RTO: 0 /100 WBC
PH UR STRIP: 6 [PH] (ref 5–8)
PLATELET # BLD AUTO: 250 K/UL (ref 150–450)
PMV BLD AUTO: 9.2 FL (ref 9.2–12.9)
POC MOLECULAR INFLUENZA A AGN: NEGATIVE
POC MOLECULAR INFLUENZA B AGN: NEGATIVE
POTASSIUM SERPL-SCNC: 3.8 MMOL/L (ref 3.5–5.1)
PROT SERPL-MCNC: 6.8 G/DL (ref 6–8.4)
PROT UR QL STRIP: ABNORMAL
RBC # BLD AUTO: 4.58 M/UL (ref 4.6–6.2)
RBC #/AREA URNS HPF: 12 /HPF (ref 0–4)
SARS-COV-2 RDRP RESP QL NAA+PROBE: NEGATIVE
SODIUM SERPL-SCNC: 133 MMOL/L (ref 136–145)
SP GR UR STRIP: 1.01 (ref 1–1.03)
SQUAMOUS #/AREA URNS HPF: 0 /HPF
URN SPEC COLLECT METH UR: ABNORMAL
UROBILINOGEN UR STRIP-ACNC: NEGATIVE EU/DL
WBC # BLD AUTO: 12.44 K/UL (ref 3.9–12.7)
WBC #/AREA URNS HPF: 0 /HPF (ref 0–5)

## 2023-05-30 PROCEDURE — 87502 INFLUENZA DNA AMP PROBE: CPT

## 2023-05-30 PROCEDURE — 99284 EMERGENCY DEPT VISIT MOD MDM: CPT | Mod: 25

## 2023-05-30 PROCEDURE — 81000 URINALYSIS NONAUTO W/SCOPE: CPT | Performed by: EMERGENCY MEDICINE

## 2023-05-30 PROCEDURE — 80053 COMPREHEN METABOLIC PANEL: CPT | Performed by: EMERGENCY MEDICINE

## 2023-05-30 PROCEDURE — 85025 COMPLETE CBC W/AUTO DIFF WBC: CPT | Performed by: EMERGENCY MEDICINE

## 2023-05-30 PROCEDURE — 82962 GLUCOSE BLOOD TEST: CPT

## 2023-05-30 RX ORDER — DICLOFENAC SODIUM 10 MG/G
2 GEL TOPICAL 2 TIMES DAILY PRN
Qty: 100 G | Refills: 0 | Status: SHIPPED | OUTPATIENT
Start: 2023-05-30 | End: 2023-06-26 | Stop reason: SDUPTHER

## 2023-05-30 NOTE — ED PROVIDER NOTES
Encounter Date: 5/30/2023       History     Chief Complaint   Patient presents with    Knee Pain     EMS called for a male that had a fall and landed on L knee, pt has chronic L knee pain and states that this made it worse, no obvious trauma. EMS states pt CBG at 295 mg/dl,  pt states that he forgot to take insulin today.     72 y.o. male Past Medical History:  3/20/2020: Acute congestive heart failure  No date: Alcohol abuse  No date: Arthritis  11/24/2021: Asthma attack  No date: Coronary artery disease  No date: Diabetes mellitus  No date: Hyperlipemia  No date: Hypertension  No date: Rhabdomyolysis     Notes that he has chronic L knee pain, endorses a mechanical fall backwards this morning  and endorses head/neck/back pain. Pt is on eliquis. Denies f/c, n/v, diarrhea/dysuria or other complaints.    Review of patient's allergies indicates:  No Known Allergies  Past Medical History:   Diagnosis Date    Acute congestive heart failure 3/20/2020    Alcohol abuse     Arthritis     Asthma attack 11/24/2021    Coronary artery disease     Diabetes mellitus     Hyperlipemia     Hypertension     Rhabdomyolysis      Past Surgical History:   Procedure Laterality Date    EYE SURGERY      TRANSESOPHAGEAL ECHOCARDIOGRAM WITH POSSIBLE CARDIOVERSION (MARIA W/ POSS CARDIOVERSION) N/A 1/5/2023    Procedure: Transesophageal echo (MARIA) intra-procedure log documentation;  Surgeon: Indra Foote MD;  Location: [facility] CATH LAB;  Service: Cardiology;  Laterality: N/A;    TREATMENT OF CARDIAC ARRHYTHMIA N/A 12/7/2020    Procedure: Cardioversion or Defibrillation;  Surgeon: Indra Foote MD;  Location: [facility] CATH LAB;  Service: Cardiology;  Laterality: N/A;    TREATMENT OF CARDIAC ARRHYTHMIA N/A 12/30/2020    Procedure: Cardioversion or Defibrillation;  Surgeon: Tyrone Steen MD;  Location: [facility] CATH LAB;  Service: Cardiology;  Laterality: N/A;    TREATMENT OF CARDIAC ARRHYTHMIA N/A 1/5/2023    Procedure: Cardioversion or  Defibrillation;  Surgeon: Indra Foote MD;  Location: [facility] CATH LAB;  Service: Cardiology;  Laterality: N/A;    VASCULAR SURGERY       Family History   Problem Relation Age of Onset    Hypertension Mother     Diabetes Mother     Diabetes Father     Hypertension Father     Diabetes Sister     Hypertension Sister      Social History     Tobacco Use    Smoking status: Never    Smokeless tobacco: Never   Substance Use Topics    Alcohol use: Not Currently     Alcohol/week: 6.0 standard drinks     Types: 6 Cans of beer per week     Comment: daily    Drug use: No     Review of Systems   Constitutional:  Negative for fever.   HENT:  Negative for sore throat.    Respiratory:  Negative for shortness of breath.    Cardiovascular:  Negative for chest pain.   Gastrointestinal:  Negative for nausea.   Genitourinary:  Negative for dysuria.   Musculoskeletal:  Negative for back pain.   Skin:  Negative for rash.   Neurological:  Negative for weakness.   Hematological:  Does not bruise/bleed easily.   All other systems reviewed and are negative.    Physical Exam     Initial Vitals [05/30/23 1456]   BP Pulse Resp Temp SpO2   (!) 142/74 72 20 (!) 100.9 °F (38.3 °C) 100 %      MAP       --         Physical Exam    Constitutional: He appears well-developed and well-nourished.   HENT:   Head: Normocephalic and atraumatic.   Eyes: EOM are normal. Pupils are equal, round, and reactive to light.   Cardiovascular:  Normal rate and regular rhythm.           Pulmonary/Chest: Effort normal.   Abdominal: He exhibits no distension.   Musculoskeletal:         General: No tenderness or edema.     Neurological: He is alert and oriented to person, place, and time. No cranial nerve deficit.   Skin: Skin is warm and dry.   Psychiatric: He has a normal mood and affect.   LLE: L knee swollen with moderate effusion, no cellulitis/erythema, able to flex and ext knee, no pain with micromotion  Not ant post drawer, no med/lat laxity or effusion    ED  Course   Procedures  MEDICAL DECISION MAKING    After review of the patient's physical exam, ED testing, and history/symptoms, relevant labs, imaging, available outside records  a wide differential was considered including but not limited to: infectious, traumatic, vascular, toxicological , metabolic, malignant, ischemic, embolic, psychological, genetic, iatrogenic, idiopathic, medication reaction, substance dependence/intoxication/withdrawal, electrolyte or blood dyscrasia, and other etiologies.        labs/imaging/interventions include:       Medications - No data to display  Labs Reviewed   URINALYSIS, REFLEX TO URINE CULTURE - Abnormal; Notable for the following components:       Result Value    Protein, UA 1+ (*)     Glucose, UA 1+ (*)     Occult Blood UA 2+ (*)     All other components within normal limits    Narrative:     Specimen Source->Urine   CBC W/ AUTO DIFFERENTIAL - Abnormal; Notable for the following components:    RBC 4.58 (*)     Hemoglobin 12.7 (*)     Hematocrit 36.7 (*)     MCV 80 (*)     Immature Granulocytes 1.2 (*)     Gran # (ANC) 9.6 (*)     Immature Grans (Abs) 0.15 (*)     Lymph # 0.6 (*)     Mono # 2.1 (*)     Gran % 76.8 (*)     Lymph % 4.9 (*)     Mono % 16.8 (*)     All other components within normal limits   COMPREHENSIVE METABOLIC PANEL - Abnormal; Notable for the following components:    Sodium 133 (*)     Chloride 94 (*)     Glucose 237 (*)     BUN 83 (*)     Creatinine 2.6 (*)     Albumin 3.1 (*)     Total Bilirubin 1.3 (*)     Alkaline Phosphatase 51 (*)     eGFR 25 (*)     All other components within normal limits   URINALYSIS MICROSCOPIC - Abnormal; Notable for the following components:    RBC, UA 12 (*)     All other components within normal limits    Narrative:     Specimen Source->Urine   SARS-COV-2 RDRP GENE   POCT INFLUENZA A/B MOLECULAR      X-Ray Lumbar Spine Ap And Lateral   Final Result      No acute lumbar spine injury      Degenerative changes         Electronically  signed by: Jose L Pineda Jr   Date:    05/30/2023   Time:    16:38      X-Ray Hips Bilateral 2 View Incl AP Pelvis   Final Result      1. No acute displaced fracture or dislocation of the pelvis or hips.         Electronically signed by: Tico Dobson MD   Date:    05/30/2023   Time:    16:29      X-Ray Chest AP Portable   Final Result      No acute abnormality.         Electronically signed by: Kris Argueta MD   Date:    05/30/2023   Time:    16:25      X-Ray Knee 1 or 2 View Left   Final Result      1. Improving suprapatellar effusion, no acute displaced fracture or dislocation peer         Electronically signed by: Tico Dobson MD   Date:    05/30/2023   Time:    16:26      CT Head Without Contrast   Final Result      1. No acute intracranial process.   2. Involutional changes with chronic microvascular ischemic changes and small remote left frontal cortical infarct and small remote lacunar infarct of the right thalamus.         Electronically signed by: Elbert Martinez   Date:    05/30/2023   Time:    16:20      CT Cervical Spine Without Contrast   Final Result      1. No acute abnormality.   2. Multilevel chronic degenerative changes.         Electronically signed by: Elbert Martinez   Date:    05/30/2023   Time:    16:33            Labs Reviewed   URINALYSIS, REFLEX TO URINE CULTURE - Abnormal; Notable for the following components:       Result Value    Protein, UA 1+ (*)     Glucose, UA 1+ (*)     Occult Blood UA 2+ (*)     All other components within normal limits    Narrative:     Specimen Source->Urine   CBC W/ AUTO DIFFERENTIAL - Abnormal; Notable for the following components:    RBC 4.58 (*)     Hemoglobin 12.7 (*)     Hematocrit 36.7 (*)     MCV 80 (*)     Immature Granulocytes 1.2 (*)     Gran # (ANC) 9.6 (*)     Immature Grans (Abs) 0.15 (*)     Lymph # 0.6 (*)     Mono # 2.1 (*)     Gran % 76.8 (*)     Lymph % 4.9 (*)     Mono % 16.8 (*)     All other components within normal limits    COMPREHENSIVE METABOLIC PANEL - Abnormal; Notable for the following components:    Sodium 133 (*)     Chloride 94 (*)     Glucose 237 (*)     BUN 83 (*)     Creatinine 2.6 (*)     Albumin 3.1 (*)     Total Bilirubin 1.3 (*)     Alkaline Phosphatase 51 (*)     eGFR 25 (*)     All other components within normal limits   URINALYSIS MICROSCOPIC - Abnormal; Notable for the following components:    RBC, UA 12 (*)     All other components within normal limits    Narrative:     Specimen Source->Urine   SARS-COV-2 RDRP GENE   POCT INFLUENZA A/B MOLECULAR          Imaging Results              X-Ray Lumbar Spine Ap And Lateral (Final result)  Result time 05/30/23 16:38:38      Final result by Jose L Turcios Jr., MD (05/30/23 16:38:38)                   Impression:      No acute lumbar spine injury    Degenerative changes      Electronically signed by: Jose L Pineda Jr  Date:    05/30/2023  Time:    16:38               Narrative:    EXAMINATION:  XR LUMBAR SPINE AP AND LATERAL    CLINICAL HISTORY:  trauma;    TECHNIQUE:  Three views of the lumbar spine were performed.    COMPARISON:  None    FINDINGS:  Alignment: Alignment is maintained.    Vertebrae: Vertebral body heights are maintained.  No suspicious appearing lytic or blastic lesions.    Discs and facets: Multilevel disc space narrowing and spondylitic spurring.  Facet arthritis is present at the lower lumbar levels.    Miscellaneous: There is prominent vascular calcification in the pelvis.                                       X-Ray Hips Bilateral 2 View Incl AP Pelvis (Final result)  Result time 05/30/23 16:29:12      Final result by Tico Dobson MD (05/30/23 16:29:12)                   Impression:      1. No acute displaced fracture or dislocation of the pelvis or hips.      Electronically signed by: Tico Dobson MD  Date:    05/30/2023  Time:    16:29               Narrative:    EXAMINATION:  XR HIPS BILATERAL 2 VIEW INCL AP PELVIS    CLINICAL  HISTORY:  Injury, unspecified, initial encounter    TECHNIQUE:  AP view of the pelvis and frogleg lateral views of both hips were performed.    COMPARISON:  None.    FINDINGS:  Three views oral hips and pelvis.    There is osteopenia.  There are degenerative changes of the bilateral sacroiliac joints and pubic symphysis.  There are degenerative changes of the bilateral femoroacetabular joints without dislocation.  There is extensive vascular calcification.  No convincing acute displaced fracture or dislocation of the pelvis or either hip.                                       X-Ray Chest AP Portable (Final result)  Result time 05/30/23 16:25:21      Final result by Kris Argueta MD (05/30/23 16:25:21)                   Impression:      No acute abnormality.      Electronically signed by: Kris Argueta MD  Date:    05/30/2023  Time:    16:25               Narrative:    EXAMINATION:  XR CHEST AP PORTABLE    CLINICAL HISTORY:  Fever, unspecified    TECHNIQUE:  Single view of the chest were performed.    COMPARISON:  Chest radiograph dated 1/30/2023    FINDINGS:  The trachea and cardiomediastinal silhouette remains stable.  There is no evidence of pleural effusions, pneumothoraces or consolidations.  Lungs are clear.  Osseous structures demonstrate no evidence for acute fractures or dislocations.                                       X-Ray Knee 1 or 2 View Left (Final result)  Result time 05/30/23 16:26:48   Procedure changed from X-Ray Knee 3 View Left     Final result by Tico Dobson MD (05/30/23 16:26:48)                   Impression:      1. Improving suprapatellar effusion, no acute displaced fracture or dislocation peer      Electronically signed by: Tico Dobson MD  Date:    05/30/2023  Time:    16:26               Narrative:    EXAMINATION:  XR KNEE 1 OR 2 VIEW LEFT    CLINICAL HISTORY:  Trauma;  Injury, unspecified, initial encounter    COMPARISON:  05/20/2023    FINDINGS:  Two views left knee.    There  is osteopenia.  There are degenerative changes of the knee.  No acute displaced fracture or dislocation of the knee.  There is a mild suprapatellar effusion, improved since the previous examination.  Cirrhotic foci are stable involving the distal femur and proximal tibia.  There is vascular calcification.                                       CT Head Without Contrast (Final result)  Result time 05/30/23 16:20:13      Final result by Elbert Nunez MD (05/30/23 16:20:13)                   Impression:      1. No acute intracranial process.  2. Involutional changes with chronic microvascular ischemic changes and small remote left frontal cortical infarct and small remote lacunar infarct of the right thalamus.      Electronically signed by: Elbert Nunez  Date:    05/30/2023  Time:    16:20               Narrative:    EXAMINATION:  CT HEAD WITHOUT CONTRAST    CLINICAL HISTORY:  Head trauma, minor (Age >= 65y);    TECHNIQUE:  Low dose axial CT images obtained throughout the head without intravenous contrast. Sagittal and coronal reconstructions were performed.    COMPARISON:  01/30/2023    FINDINGS:  Intracranial compartment:    Ventricles and sulci are normal in size for age without evidence of hydrocephalus. No extra-axial blood or fluid collections.    Moderate involutional changes and chronic microvascular ischemic changes in the periventricular and subcortical white matter.    Small remote left frontal cortical infarct.  Small remote lacunar infarct of the right thalamus.  No parenchymal mass, hemorrhage, edema or major vascular distribution infarct.    Skull/extracranial contents (limited evaluation): No fracture. Mastoid air cells and paranasal sinuses are essentially clear.                                       CT Cervical Spine Without Contrast (Final result)  Result time 05/30/23 16:33:27      Final result by Elbert Nunez MD (05/30/23 16:33:27)                   Impression:      1. No acute  abnormality.  2. Multilevel chronic degenerative changes.      Electronically signed by: Elbert Martinez  Date:    05/30/2023  Time:    16:33               Narrative:    EXAMINATION:  CT CERVICAL SPINE WITHOUT CONTRAST    CLINICAL HISTORY:  Neck trauma (Age >= 65y);    TECHNIQUE:  Low dose axial CT images through the cervical spine, with sagittal and coronal reformations.  Contrast was not administered.    COMPARISON:  None    FINDINGS:  No acute fractures of the cervical spine.  Slight straightening of the normal cervical lordosis.    Moderate disc space narrowing at C5-6 and C6-7.  Mild disc space narrowing at C4-5.  Osteophytic spurring of the endplates.    Mild diffuse posterior disc osteophyte complex multiple levels most prominent at C4-5, C5-6 and C6-7.    Severe foraminal narrowing at C3-4 on the left, C4-5 on the left, C5-6 bilaterally, C6-7 bilaterally.    Limited evaluation of the intraspinal contents demonstrates no hematoma or mass.Paraspinal soft tissues exhibit no acute abnormalities.    Mild superficial edema posteriorly in the subcutaneous tissues at the head neck junction                                       Medications - No data to display     Fall on blood thinners: ct head/cspine, xr lumbar, hips  Low grade temp noted, pt without uri symptoms. Knee does not look septic and he is able to move it.     have ordered UA, screening labs, cxr    Pt has a L knee brace which he has been using and crutches and at times a wheelchair at home.     Will discharge with instructions to use crutches and f/u with ortho. Labs/presentation not c/w septic knee. CKD noted. Will write for voltaren cream.           I have independently evaluated and interpreted all available labs and imaging to the extent of the scope of my practice.  The suspected diagnosis, treatment and plan were discussed with the patient. All questions or concerns have been addressed.    Note was created using voice recognition software. Note may  have occasional typographical or grammatical errors , garbled syntax, and other bizarre constructions that may not have been identified and edited despite good sal initial review prior to signing.             Clinical Impression:   Final diagnoses:  [T14.90XA] Trauma  [W19.XXXA] Fall, initial encounter (Primary)  [M25.569] Knee pain, unspecified chronicity, unspecified laterality        ED Disposition Condition    Discharge Stable          ED Prescriptions    None       Follow-up Information       Follow up With Specialties Details Why Contact Info    [facility] [place]    [address] [place] [address]  [phone]               Norma Ling MD  05/30/23 9918       Norma Ling MD  05/30/23 6922

## 2023-05-30 NOTE — DISCHARGE INSTRUCTIONS

## 2023-06-01 ENCOUNTER — HOSPITAL ENCOUNTER (OUTPATIENT)
Facility: HOSPITAL | Age: 73
Discharge: HOME HEALTH | End: 2023-06-02
Attending: EMERGENCY MEDICINE | Admitting: EMERGENCY MEDICINE
Payer: MEDICARE

## 2023-06-01 DIAGNOSIS — S39.92XA BACK INJURY: ICD-10-CM

## 2023-06-01 DIAGNOSIS — M25.569 KNEE PAIN: ICD-10-CM

## 2023-06-01 DIAGNOSIS — R07.9 CHEST PAIN: ICD-10-CM

## 2023-06-01 PROBLEM — N18.9 ACUTE KIDNEY INJURY SUPERIMPOSED ON CHRONIC KIDNEY DISEASE: Status: ACTIVE | Noted: 2017-03-10

## 2023-06-01 LAB
ALBUMIN SERPL BCP-MCNC: 2.9 G/DL (ref 3.5–5.2)
ALP SERPL-CCNC: 62 U/L (ref 55–135)
ALT SERPL W/O P-5'-P-CCNC: 15 U/L (ref 10–44)
AMORPH CRY URNS QL MICRO: ABNORMAL
ANION GAP SERPL CALC-SCNC: 17 MMOL/L (ref 8–16)
AST SERPL-CCNC: 14 U/L (ref 10–40)
BACTERIA #/AREA URNS HPF: ABNORMAL /HPF
BASOPHILS # BLD AUTO: 0.02 K/UL (ref 0–0.2)
BASOPHILS NFR BLD: 0.1 % (ref 0–1.9)
BILIRUB SERPL-MCNC: 2.4 MG/DL (ref 0.1–1)
BILIRUB UR QL STRIP: NEGATIVE
BNP SERPL-MCNC: 318 PG/ML (ref 0–99)
BUN SERPL-MCNC: 91 MG/DL (ref 8–23)
CALCIUM SERPL-MCNC: 10.6 MG/DL (ref 8.7–10.5)
CHLORIDE SERPL-SCNC: 93 MMOL/L (ref 95–110)
CK SERPL-CCNC: 31 U/L (ref 20–200)
CLARITY UR: CLEAR
CO2 SERPL-SCNC: 26 MMOL/L (ref 23–29)
COLOR UR: YELLOW
CREAT SERPL-MCNC: 3 MG/DL (ref 0.5–1.4)
DIFFERENTIAL METHOD: ABNORMAL
EOSINOPHIL # BLD AUTO: 0 K/UL (ref 0–0.5)
EOSINOPHIL NFR BLD: 0.1 % (ref 0–8)
ERYTHROCYTE [DISTWIDTH] IN BLOOD BY AUTOMATED COUNT: 13.5 % (ref 11.5–14.5)
EST. GFR  (NO RACE VARIABLE): 21 ML/MIN/1.73 M^2
GLUCOSE SERPL-MCNC: 228 MG/DL (ref 70–110)
GLUCOSE UR QL STRIP: NEGATIVE
HCT VFR BLD AUTO: 43.6 % (ref 40–54)
HGB BLD-MCNC: 14 G/DL (ref 14–18)
HGB UR QL STRIP: ABNORMAL
HYALINE CASTS #/AREA URNS LPF: 8 /LPF
IMM GRANULOCYTES # BLD AUTO: 0.14 K/UL (ref 0–0.04)
IMM GRANULOCYTES NFR BLD AUTO: 0.8 % (ref 0–0.5)
KETONES UR QL STRIP: NEGATIVE
LACTATE SERPL-SCNC: 1.9 MMOL/L (ref 0.5–2.2)
LEUKOCYTE ESTERASE UR QL STRIP: NEGATIVE
LYMPHOCYTES # BLD AUTO: 0.4 K/UL (ref 1–4.8)
LYMPHOCYTES NFR BLD: 2.1 % (ref 18–48)
MCH RBC QN AUTO: 26.8 PG (ref 27–31)
MCHC RBC AUTO-ENTMCNC: 32.1 G/DL (ref 32–36)
MCV RBC AUTO: 83 FL (ref 82–98)
MICROSCOPIC COMMENT: ABNORMAL
MONOCYTES # BLD AUTO: 1.7 K/UL (ref 0.3–1)
MONOCYTES NFR BLD: 10 % (ref 4–15)
NEUTROPHILS # BLD AUTO: 14.9 K/UL (ref 1.8–7.7)
NEUTROPHILS NFR BLD: 86.9 % (ref 38–73)
NITRITE UR QL STRIP: NEGATIVE
NRBC BLD-RTO: 0 /100 WBC
PH UR STRIP: 6 [PH] (ref 5–8)
PLATELET # BLD AUTO: 283 K/UL (ref 150–450)
PMV BLD AUTO: 9.4 FL (ref 9.2–12.9)
POCT GLUCOSE: 222 MG/DL (ref 70–110)
POCT GLUCOSE: 237 MG/DL (ref 70–110)
POTASSIUM SERPL-SCNC: 3.6 MMOL/L (ref 3.5–5.1)
PROT SERPL-MCNC: 7.7 G/DL (ref 6–8.4)
PROT UR QL STRIP: ABNORMAL
RBC # BLD AUTO: 5.23 M/UL (ref 4.6–6.2)
RBC #/AREA URNS HPF: 47 /HPF (ref 0–4)
SODIUM SERPL-SCNC: 136 MMOL/L (ref 136–145)
SP GR UR STRIP: 1.01 (ref 1–1.03)
TROPONIN I SERPL DL<=0.01 NG/ML-MCNC: 0.09 NG/ML (ref 0–0.03)
TROPONIN I SERPL DL<=0.01 NG/ML-MCNC: 0.09 NG/ML (ref 0–0.03)
TROPONIN I SERPL DL<=0.01 NG/ML-MCNC: 0.1 NG/ML (ref 0–0.03)
URN SPEC COLLECT METH UR: ABNORMAL
UROBILINOGEN UR STRIP-ACNC: NEGATIVE EU/DL
WBC # BLD AUTO: 17.07 K/UL (ref 3.9–12.7)
WBC #/AREA URNS HPF: 3 /HPF (ref 0–5)

## 2023-06-01 PROCEDURE — 96372 THER/PROPH/DIAG INJ SC/IM: CPT | Performed by: HOSPITALIST

## 2023-06-01 PROCEDURE — G0378 HOSPITAL OBSERVATION PER HR: HCPCS

## 2023-06-01 PROCEDURE — 93010 EKG 12-LEAD: ICD-10-PCS | Mod: ,,, | Performed by: INTERNAL MEDICINE

## 2023-06-01 PROCEDURE — 99285 EMERGENCY DEPT VISIT HI MDM: CPT | Mod: 25

## 2023-06-01 PROCEDURE — 93010 ELECTROCARDIOGRAM REPORT: CPT | Mod: ,,, | Performed by: INTERNAL MEDICINE

## 2023-06-01 PROCEDURE — 25000003 PHARM REV CODE 250: Performed by: HOSPITALIST

## 2023-06-01 PROCEDURE — 96360 HYDRATION IV INFUSION INIT: CPT

## 2023-06-01 PROCEDURE — 84484 ASSAY OF TROPONIN QUANT: CPT | Mod: 91 | Performed by: HOSPITALIST

## 2023-06-01 PROCEDURE — 81000 URINALYSIS NONAUTO W/SCOPE: CPT | Performed by: EMERGENCY MEDICINE

## 2023-06-01 PROCEDURE — 84484 ASSAY OF TROPONIN QUANT: CPT | Performed by: EMERGENCY MEDICINE

## 2023-06-01 PROCEDURE — 36415 COLL VENOUS BLD VENIPUNCTURE: CPT | Performed by: HOSPITALIST

## 2023-06-01 PROCEDURE — 80053 COMPREHEN METABOLIC PANEL: CPT | Performed by: EMERGENCY MEDICINE

## 2023-06-01 PROCEDURE — 96361 HYDRATE IV INFUSION ADD-ON: CPT

## 2023-06-01 PROCEDURE — 82550 ASSAY OF CK (CPK): CPT | Performed by: EMERGENCY MEDICINE

## 2023-06-01 PROCEDURE — 93005 ELECTROCARDIOGRAM TRACING: CPT

## 2023-06-01 PROCEDURE — 83605 ASSAY OF LACTIC ACID: CPT | Performed by: EMERGENCY MEDICINE

## 2023-06-01 PROCEDURE — 83880 ASSAY OF NATRIURETIC PEPTIDE: CPT | Performed by: EMERGENCY MEDICINE

## 2023-06-01 PROCEDURE — 63600175 PHARM REV CODE 636 W HCPCS: Performed by: HOSPITALIST

## 2023-06-01 PROCEDURE — 85025 COMPLETE CBC W/AUTO DIFF WBC: CPT | Performed by: EMERGENCY MEDICINE

## 2023-06-01 RX ORDER — DEXTROSE 40 %
15 GEL (GRAM) ORAL
Status: DISCONTINUED | OUTPATIENT
Start: 2023-06-01 | End: 2023-06-02 | Stop reason: HOSPADM

## 2023-06-01 RX ORDER — POLYETHYLENE GLYCOL 3350 17 G/17G
17 POWDER, FOR SOLUTION ORAL DAILY
Status: DISCONTINUED | OUTPATIENT
Start: 2023-06-01 | End: 2023-06-02 | Stop reason: HOSPADM

## 2023-06-01 RX ORDER — GLUCAGON 1 MG
1 KIT INJECTION
Status: DISCONTINUED | OUTPATIENT
Start: 2023-06-01 | End: 2023-06-02 | Stop reason: HOSPADM

## 2023-06-01 RX ORDER — SIMETHICONE 80 MG
1 TABLET,CHEWABLE ORAL 4 TIMES DAILY PRN
Status: DISCONTINUED | OUTPATIENT
Start: 2023-06-01 | End: 2023-06-02 | Stop reason: HOSPADM

## 2023-06-01 RX ORDER — METOPROLOL SUCCINATE 25 MG/1
25 TABLET, EXTENDED RELEASE ORAL
Status: ON HOLD | COMMUNITY
Start: 2023-05-24 | End: 2023-06-16 | Stop reason: HOSPADM

## 2023-06-01 RX ORDER — POTASSIUM CHLORIDE 750 MG/1
10 TABLET, EXTENDED RELEASE ORAL DAILY
Status: ON HOLD | COMMUNITY
Start: 2023-02-23 | End: 2023-09-27 | Stop reason: SDUPTHER

## 2023-06-01 RX ORDER — INSULIN ASPART 100 [IU]/ML
1-10 INJECTION, SOLUTION INTRAVENOUS; SUBCUTANEOUS
Status: DISCONTINUED | OUTPATIENT
Start: 2023-06-01 | End: 2023-06-02 | Stop reason: HOSPADM

## 2023-06-01 RX ORDER — TALC
6 POWDER (GRAM) TOPICAL NIGHTLY PRN
Status: DISCONTINUED | OUTPATIENT
Start: 2023-06-01 | End: 2023-06-02 | Stop reason: HOSPADM

## 2023-06-01 RX ORDER — ROSUVASTATIN CALCIUM 40 MG/1
40 TABLET, COATED ORAL NIGHTLY
COMMUNITY
Start: 2023-05-17

## 2023-06-01 RX ORDER — SODIUM CHLORIDE 9 MG/ML
INJECTION, SOLUTION INTRAVENOUS CONTINUOUS
Status: DISCONTINUED | OUTPATIENT
Start: 2023-06-01 | End: 2023-06-02 | Stop reason: HOSPADM

## 2023-06-01 RX ORDER — TIZANIDINE 4 MG/1
4 TABLET ORAL EVERY 8 HOURS PRN
Status: DISCONTINUED | OUTPATIENT
Start: 2023-06-01 | End: 2023-06-02 | Stop reason: HOSPADM

## 2023-06-01 RX ORDER — PROCHLORPERAZINE EDISYLATE 5 MG/ML
5 INJECTION INTRAMUSCULAR; INTRAVENOUS EVERY 6 HOURS PRN
Status: DISCONTINUED | OUTPATIENT
Start: 2023-06-01 | End: 2023-06-02 | Stop reason: HOSPADM

## 2023-06-01 RX ORDER — ONDANSETRON 2 MG/ML
4 INJECTION INTRAMUSCULAR; INTRAVENOUS EVERY 8 HOURS PRN
Status: DISCONTINUED | OUTPATIENT
Start: 2023-06-01 | End: 2023-06-02 | Stop reason: HOSPADM

## 2023-06-01 RX ORDER — SODIUM CHLORIDE 0.9 % (FLUSH) 0.9 %
10 SYRINGE (ML) INJECTION EVERY 8 HOURS PRN
Status: DISCONTINUED | OUTPATIENT
Start: 2023-06-01 | End: 2023-06-02 | Stop reason: HOSPADM

## 2023-06-01 RX ORDER — DEXTROSE 40 %
30 GEL (GRAM) ORAL
Status: DISCONTINUED | OUTPATIENT
Start: 2023-06-01 | End: 2023-06-02 | Stop reason: HOSPADM

## 2023-06-01 RX ORDER — NALOXONE HCL 0.4 MG/ML
0.02 VIAL (ML) INJECTION
Status: DISCONTINUED | OUTPATIENT
Start: 2023-06-01 | End: 2023-06-02 | Stop reason: HOSPADM

## 2023-06-01 RX ORDER — FUROSEMIDE 40 MG/1
80 TABLET ORAL 2 TIMES DAILY
Status: ON HOLD | COMMUNITY
Start: 2023-05-23 | End: 2023-06-16 | Stop reason: SDUPTHER

## 2023-06-01 RX ORDER — MAG HYDROX/ALUMINUM HYD/SIMETH 200-200-20
30 SUSPENSION, ORAL (FINAL DOSE FORM) ORAL 4 TIMES DAILY PRN
Status: DISCONTINUED | OUTPATIENT
Start: 2023-06-01 | End: 2023-06-02 | Stop reason: HOSPADM

## 2023-06-01 RX ORDER — AMLODIPINE BESYLATE 5 MG/1
10 TABLET ORAL DAILY
Status: DISCONTINUED | OUTPATIENT
Start: 2023-06-01 | End: 2023-06-02 | Stop reason: HOSPADM

## 2023-06-01 RX ORDER — ACETAMINOPHEN 325 MG/1
650 TABLET ORAL EVERY 6 HOURS PRN
Status: DISCONTINUED | OUTPATIENT
Start: 2023-06-01 | End: 2023-06-02 | Stop reason: HOSPADM

## 2023-06-01 RX ADMIN — INSULIN ASPART 2 UNITS: 100 INJECTION, SOLUTION INTRAVENOUS; SUBCUTANEOUS at 09:06

## 2023-06-01 RX ADMIN — TIZANIDINE 4 MG: 4 TABLET ORAL at 06:06

## 2023-06-01 RX ADMIN — APIXABAN 5 MG: 5 TABLET, FILM COATED ORAL at 08:06

## 2023-06-01 RX ADMIN — INSULIN ASPART 4 UNITS: 100 INJECTION, SOLUTION INTRAVENOUS; SUBCUTANEOUS at 05:06

## 2023-06-01 RX ADMIN — AMLODIPINE BESYLATE 10 MG: 5 TABLET ORAL at 04:06

## 2023-06-01 RX ADMIN — ACETAMINOPHEN 650 MG: 325 TABLET ORAL at 04:06

## 2023-06-01 RX ADMIN — MELATONIN TAB 3 MG 6 MG: 3 TAB at 08:06

## 2023-06-01 RX ADMIN — SODIUM CHLORIDE: 9 INJECTION, SOLUTION INTRAVENOUS at 04:06

## 2023-06-01 NOTE — PHARMACY MED REC
"  Admission Medication History     The home medication history was taken by Mariajose Matthews CPhT.    You may go to "Admission" then "Reconcile Home Medications" tabs to review and/or act upon these items.     The home medication list has been updated by the Pharmacy department.   Please read ALL comments highlighted in yellow.   Please address this information as you see fit.    Feel free to contact us if you have any questions or require assistance.      The medications listed below were removed from the home medication list. Please reorder if appropriate:  Patient reports no longer taking the following medication(s):          Medications listed below were obtained from: Patient/family and Analytic software- [employer]  (Not in a hospital admission)      Spoke with patient to conduct an medication history and patient stated he does not know the names of his medications.  He stated that he is taking medication for high blood pressure and diabetes.    Patient states that he has medications at home but has not taken any in 2 days.  He has only taken something for pain but does not know the name of the medication.  Asked patient if there was someone to contact to assist with medication history and he stated no.        Mariajose Matthews CPhT.  [phone]                .        "

## 2023-06-01 NOTE — ASSESSMENT & PLAN NOTE
No evidence of acute decompensation or fluid overload, appears slightly over diuresed, cautious gentle IV fluid hydration, temporarily hold diuretics, I&Os, daily weights.

## 2023-06-01 NOTE — ASSESSMENT & PLAN NOTE
Brief atypical pain now resolved, EKG unremarkable and troponin typical of usual baseline with flat trend thus far.  Monitor on tele and obtain serial troponin.

## 2023-06-01 NOTE — H&P
[facility] Medicine  History & Physical    Patient Name: Chato Bowie  MRN: [id number]  Patient Class: OP- Observation  Admission Date: 6/1/2023  Attending Physician: Sea Phelan MD   Primary Care Provider: [facility]         Patient information was obtained from patient, past medical records and ER records.     Subjective:     Principal Problem:Acute kidney injury superimposed on chronic kidney disease    Chief Complaint:   Chief Complaint   Patient presents with    Chest Pain     BIB NOEMS. LEFT sided since 0500 this morning. Denies SOB, N/V, dizziness, weakness. No tx used.     Back Pain     Chronic diffuse back pain. Was seen for same last week. Has not filled rx. Worse with movement.         HPI: 72 y.o. male with HTN, HLD, CAD, DM2, BPH, PAF, CKD stage IV, and chronic diastolic heart failure presents with a complaint of chest pain.  Acute onset this morning, located left chest, associated with shortness of breath, constant, does not radiate.  Denies fever, chills, cough, palpitations, dizziness, syncope, n/v/d, or abdominal pain.  Does additionally report back pain which is chronic.  In the ED, EKG without evidence of acute ischemia, initial troponin 0.09 which is typical of his usual baseline.  Labs show ELIZABETH and leukocytosis, .  Otherwise unremarkable for acute abnormality.  Do evidence of infectious source and no other signs of infection such as fever.  Placed in observation for gentle hydration and serial troponin.      Past Medical History:   Diagnosis Date    Acute congestive heart failure 3/20/2020    Alcohol abuse     Arthritis     Asthma attack 11/24/2021    Coronary artery disease     Diabetes mellitus     Hyperlipemia     Hypertension     Rhabdomyolysis        Past Surgical History:   Procedure Laterality Date    EYE SURGERY      TRANSESOPHAGEAL ECHOCARDIOGRAM WITH POSSIBLE CARDIOVERSION (MARIA W/ POSS CARDIOVERSION) N/A 1/5/2023    Procedure:  Transesophageal echo (MARIA) intra-procedure log documentation;  Surgeon: Indra Foote MD;  Location: [facility] CATH LAB;  Service: Cardiology;  Laterality: N/A;    TREATMENT OF CARDIAC ARRHYTHMIA N/A 12/7/2020    Procedure: Cardioversion or Defibrillation;  Surgeon: Indra Foote MD;  Location: [facility] CATH LAB;  Service: Cardiology;  Laterality: N/A;    TREATMENT OF CARDIAC ARRHYTHMIA N/A 12/30/2020    Procedure: Cardioversion or Defibrillation;  Surgeon: Tyrone Steen MD;  Location: [facility] CATH LAB;  Service: Cardiology;  Laterality: N/A;    TREATMENT OF CARDIAC ARRHYTHMIA N/A 1/5/2023    Procedure: Cardioversion or Defibrillation;  Surgeon: Indra Foote MD;  Location: [facility] CATH LAB;  Service: Cardiology;  Laterality: N/A;    VASCULAR SURGERY         Review of patient's allergies indicates:  No Known Allergies    No current facility-administered medications on file prior to encounter.     Current Outpatient Medications on File Prior to Encounter   Medication Sig    albuterol (PROVENTIL/VENTOLIN HFA) 90 mcg/actuation inhaler Inhale 2 puffs into the lungs every 4 (four) hours as needed.    amiodarone (PACERONE) 200 MG Tab Take 2 tablets (400 mg total) by mouth 2 (two) times daily for 7 days, THEN 1 tablet (200 mg total) 2 (two) times daily for 7 days, THEN 1 tablet (200 mg total) once daily.    amLODIPine (NORVASC) 10 MG tablet Take 10 mg by mouth Daily.    diclofenac sodium (VOLTAREN) 1 % Gel Apply 2 g topically 2 (two) times daily as needed (pain).    ELIQUIS 5 mg Tab Take 5 mg by mouth 2 (two) times daily.    furosemide (LASIX) 80 MG tablet Take 1 tablet (80 mg total) by mouth 2 (two) times daily.    hydrALAZINE (APRESOLINE) 50 MG tablet Take 1.5 tablets (75 mg total) by mouth 3 (three) times daily.    insulin aspart U-100 (NOVOLOG) 100 unit/mL (3 mL) InPn pen Inject 5 Units into the skin 3 (three) times daily.    losartan-hydrochlorothiazide 50-12.5 mg (HYZAAR) 50-12.5 mg per tablet Take  1 tablet by mouth once daily.    magnesium oxide (MAG-OX) 400 mg (241.3 mg magnesium) tablet Take 800 mg by mouth.    metOLazone (ZAROXOLYN) 5 MG tablet TAKE 1 TABLET BY MOUTH 3 TIMES A WEEK AS NEEDED FOR SEVERE LEG SWELLING    metoprolol succinate (TOPROL-XL) 50 MG 24 hr tablet Take 1 tablet (50 mg total) by mouth once daily.    multivitamin Tab Take 1 tablet by mouth once daily.    NOVOLIN 70/30 U-100 INSULIN 100 unit/mL (70-30) injection Inject 55 Units into the skin 2 (two) times daily.    potassium chloride SA (K-DUR,KLOR-CON) 20 MEQ tablet Take 2 tablets by mouth once daily.    rosuvastatin (CRESTOR) 20 MG tablet Take 20 mg by mouth once daily.    sildenafiL (VIAGRA) 100 MG tablet TAKE 1 TABLET BY MOUTH ONCE DAILY AS NEEDED FOR ERECTILE DYSFUNCTION.    tamsulosin (FLOMAX) 0.4 mg Cap Take 1 capsule (0.4 mg total) by mouth once daily.     Family History       Problem Relation (Age of Onset)    Diabetes Mother, Father, Sister    Hypertension Mother, Father, Sister          Tobacco Use    Smoking status: Never    Smokeless tobacco: Never   Substance and Sexual Activity    Alcohol use: Not Currently     Alcohol/week: 6.0 standard drinks     Types: 6 Cans of beer per week     Comment: daily    Drug use: No    Sexual activity: Yes     Partners: Female     Review of Systems   Constitutional:  Negative for chills and fever.   HENT:  Negative for congestion and rhinorrhea.    Eyes:  Negative for photophobia and visual disturbance.   Respiratory:  Positive for shortness of breath. Negative for cough.    Cardiovascular:  Positive for chest pain. Negative for palpitations and leg swelling.   Gastrointestinal:  Negative for abdominal pain, diarrhea, nausea and vomiting.   Genitourinary:  Negative for frequency, hematuria and urgency.   Musculoskeletal:  Positive for back pain.   Skin:  Negative for pallor, rash and wound.   Neurological:  Negative for light-headedness and headaches.   Psychiatric/Behavioral:   Negative for confusion and decreased concentration.    Objective:     Vital Signs (Most Recent):  Temp: 97.8 °F (36.6 °C) (06/01/23 1126)  Pulse: 95 (06/01/23 1446)  Resp: 20 (06/01/23 1126)  BP: (!) 150/72 (06/01/23 1446)  SpO2: (!) 94 % (06/01/23 1403) Vital Signs (24h Range):  Temp:  [97.8 °F (36.6 °C)] 97.8 °F (36.6 °C)  Pulse:  [89-95] 95  Resp:  [20] 20  SpO2:  [94 %-99 %] 94 %  BP: (128-183)/(72-88) 150/72     Weight: 95.3 kg (210 lb)  Body mass index is 29.29 kg/m².     Physical Exam  Vitals and nursing note reviewed.   Constitutional:       General: He is not in acute distress.     Appearance: He is well-developed.   HENT:      Head: Normocephalic and atraumatic.      Right Ear: External ear normal.      Left Ear: External ear normal.      Nose: Nose normal.   Eyes:      Conjunctiva/sclera: Conjunctivae normal.   Cardiovascular:      Rate and Rhythm: Normal rate and regular rhythm.   Pulmonary:      Effort: Pulmonary effort is normal. No respiratory distress.   Abdominal:      General: There is no distension.      Palpations: Abdomen is soft.   Musculoskeletal:         General: Normal range of motion.   Skin:     General: Skin is warm and dry.   Neurological:      Mental Status: He is alert and oriented to person, place, and time.   Psychiatric:         Thought Content: Thought content normal.              Significant Labs: All pertinent labs within the past 24 hours have been reviewed.    Significant Imaging: I have reviewed all pertinent imaging results/findings within the past 24 hours.    Assessment/Plan:     * Acute kidney injury superimposed on chronic kidney disease  Cr/BUN acutely worse than baseline, continue cautious gentle IV fluid hydration given heart failure, strict I/O's, monitor renal function and electrolytes, avoid nephrotoxic agents.     Chest pain  Brief atypical pain now resolved, EKG unremarkable and troponin typical of usual baseline with flat trend thus far.  Monitor on tele and  obtain serial troponin.    Chronic diastolic heart failure  No evidence of acute decompensation or fluid overload, appears slightly over diuresed, cautious gentle IV fluid hydration, temporarily hold diuretics, I&Os, daily weights.    Paroxysmal atrial flutter  Continue apixaban    Type 2 diabetes mellitus with kidney complication, with long-term current use of insulin  Patient's FSGs are controlled on current medication regimen.  Last A1c reviewed-   Lab Results   Component Value Date    HGBA1C 7.5 (H) 01/05/2023     Most recent fingerstick glucose reviewed-   Recent Labs   Lab 06/01/23  1624   POCTGLUCOSE 222*     Current correctional scale  Medium  Maintain anti-hyperglycemic dose as follows-   Antihyperglycemics (From admission, onward)    Start     Stop Route Frequency Ordered    06/01/23 1507  insulin aspart U-100 pen 1-10 Units         -- SubQ Before meals & nightly PRN 06/01/23 1409        Hold Oral hypoglycemics while patient is in the hospital.    Hyperlipidemia  Not on statin, continue cardiac diet.    Essential hypertension  Well controlled, hold Arb and diuretics due to renal function, continue other home medications and monitor blood pressure, adjust as needed.     CAD (coronary artery disease)  Continue home regimen of apixaban, hold Arb and diuretics due to renal function      VTE Risk Mitigation (From admission, onward)         Ordered     apixaban tablet 5 mg  2 times daily         06/01/23 1409     Reason for No Pharmacological VTE Prophylaxis  Once        Question:  Reasons:  Answer:  Already adequately anticoagulated on oral Anticoagulants    06/01/23 1409     IP VTE HIGH RISK PATIENT  Once         06/01/23 1409     Place sequential compression device  Until discontinued         06/01/23 1409                     On 06/01/2023, patient should be placed in hospital observation services under my care in collaboration with Sea Phelan MD.    Alex Leonard Jr., APRN, [facility]  Hospitalist -  Department of Hospital Medicine  [facility] [address] [place] [address]  Office #: [phone]; Pager #: [phone]

## 2023-06-01 NOTE — ASSESSMENT & PLAN NOTE
Patient's FSGs are controlled on current medication regimen.  Last A1c reviewed-   Lab Results   Component Value Date    HGBA1C 7.5 (H) 01/05/2023     Most recent fingerstick glucose reviewed-   Recent Labs   Lab 06/01/23  1624   POCTGLUCOSE 222*     Current correctional scale  Medium  Maintain anti-hyperglycemic dose as follows-   Antihyperglycemics (From admission, onward)    Start     Stop Route Frequency Ordered    06/01/23 1507  insulin aspart U-100 pen 1-10 Units         -- SubQ Before meals & nightly PRN 06/01/23 1409        Hold Oral hypoglycemics while patient is in the hospital.

## 2023-06-01 NOTE — NURSING
[facility]  Nurses Note -- 4 Eyes      6/1/2023       Skin assessed on: Admit      [x] No Pressure Injuries Present    [x]Prevention Measures Documented    [] Yes LDA  for Pressure Injury Previously documented     [] Yes New Pressure Injury Discovered   [] LDA for New Pressure Injury Added      Attending RN:  Mike España RN     Second RN:  LUIS Amezcua LPN

## 2023-06-01 NOTE — SUBJECTIVE & OBJECTIVE
Past Medical History:   Diagnosis Date    Acute congestive heart failure 3/20/2020    Alcohol abuse     Arthritis     Asthma attack 11/24/2021    Coronary artery disease     Diabetes mellitus     Hyperlipemia     Hypertension     Rhabdomyolysis        Past Surgical History:   Procedure Laterality Date    EYE SURGERY      TRANSESOPHAGEAL ECHOCARDIOGRAM WITH POSSIBLE CARDIOVERSION (MARIA W/ POSS CARDIOVERSION) N/A 1/5/2023    Procedure: Transesophageal echo (MARIA) intra-procedure log documentation;  Surgeon: Indra Foote MD;  Location: [facility] CATH LAB;  Service: Cardiology;  Laterality: N/A;    TREATMENT OF CARDIAC ARRHYTHMIA N/A 12/7/2020    Procedure: Cardioversion or Defibrillation;  Surgeon: Indra Foote MD;  Location: [facility] CATH LAB;  Service: Cardiology;  Laterality: N/A;    TREATMENT OF CARDIAC ARRHYTHMIA N/A 12/30/2020    Procedure: Cardioversion or Defibrillation;  Surgeon: Tyrone Steen MD;  Location: [facility] CATH LAB;  Service: Cardiology;  Laterality: N/A;    TREATMENT OF CARDIAC ARRHYTHMIA N/A 1/5/2023    Procedure: Cardioversion or Defibrillation;  Surgeon: Indra Foote MD;  Location: [facility] CATH LAB;  Service: Cardiology;  Laterality: N/A;    VASCULAR SURGERY         Review of patient's allergies indicates:  No Known Allergies    No current facility-administered medications on file prior to encounter.     Current Outpatient Medications on File Prior to Encounter   Medication Sig    albuterol (PROVENTIL/VENTOLIN HFA) 90 mcg/actuation inhaler Inhale 2 puffs into the lungs every 4 (four) hours as needed.    amiodarone (PACERONE) 200 MG Tab Take 2 tablets (400 mg total) by mouth 2 (two) times daily for 7 days, THEN 1 tablet (200 mg total) 2 (two) times daily for 7 days, THEN 1 tablet (200 mg total) once daily.    amLODIPine (NORVASC) 10 MG tablet Take 10 mg by mouth Daily.    diclofenac sodium (VOLTAREN) 1 % Gel Apply 2 g topically 2 (two) times daily as needed (pain).    ELIQUIS 5 mg Tab Take  5 mg by mouth 2 (two) times daily.    furosemide (LASIX) 80 MG tablet Take 1 tablet (80 mg total) by mouth 2 (two) times daily.    hydrALAZINE (APRESOLINE) 50 MG tablet Take 1.5 tablets (75 mg total) by mouth 3 (three) times daily.    insulin aspart U-100 (NOVOLOG) 100 unit/mL (3 mL) InPn pen Inject 5 Units into the skin 3 (three) times daily.    losartan-hydrochlorothiazide 50-12.5 mg (HYZAAR) 50-12.5 mg per tablet Take 1 tablet by mouth once daily.    magnesium oxide (MAG-OX) 400 mg (241.3 mg magnesium) tablet Take 800 mg by mouth.    metOLazone (ZAROXOLYN) 5 MG tablet TAKE 1 TABLET BY MOUTH 3 TIMES A WEEK AS NEEDED FOR SEVERE LEG SWELLING    metoprolol succinate (TOPROL-XL) 50 MG 24 hr tablet Take 1 tablet (50 mg total) by mouth once daily.    multivitamin Tab Take 1 tablet by mouth once daily.    NOVOLIN 70/30 U-100 INSULIN 100 unit/mL (70-30) injection Inject 55 Units into the skin 2 (two) times daily.    potassium chloride SA (K-DUR,KLOR-CON) 20 MEQ tablet Take 2 tablets by mouth once daily.    rosuvastatin (CRESTOR) 20 MG tablet Take 20 mg by mouth once daily.    sildenafiL (VIAGRA) 100 MG tablet TAKE 1 TABLET BY MOUTH ONCE DAILY AS NEEDED FOR ERECTILE DYSFUNCTION.    tamsulosin (FLOMAX) 0.4 mg Cap Take 1 capsule (0.4 mg total) by mouth once daily.     Family History       Problem Relation (Age of Onset)    Diabetes Mother, Father, Sister    Hypertension Mother, Father, Sister          Tobacco Use    Smoking status: Never    Smokeless tobacco: Never   Substance and Sexual Activity    Alcohol use: Not Currently     Alcohol/week: 6.0 standard drinks     Types: 6 Cans of beer per week     Comment: daily    Drug use: No    Sexual activity: Yes     Partners: Female     Review of Systems   Constitutional:  Negative for chills and fever.   HENT:  Negative for congestion and rhinorrhea.    Eyes:  Negative for photophobia and visual disturbance.   Respiratory:  Positive for shortness of breath. Negative for cough.     Cardiovascular:  Positive for chest pain. Negative for palpitations and leg swelling.   Gastrointestinal:  Negative for abdominal pain, diarrhea, nausea and vomiting.   Genitourinary:  Negative for frequency, hematuria and urgency.   Musculoskeletal:  Positive for back pain.   Skin:  Negative for pallor, rash and wound.   Neurological:  Negative for light-headedness and headaches.   Psychiatric/Behavioral:  Negative for confusion and decreased concentration.    Objective:     Vital Signs (Most Recent):  Temp: 97.8 °F (36.6 °C) (06/01/23 1126)  Pulse: 95 (06/01/23 1446)  Resp: 20 (06/01/23 1126)  BP: (!) 150/72 (06/01/23 1446)  SpO2: (!) 94 % (06/01/23 1403) Vital Signs (24h Range):  Temp:  [97.8 °F (36.6 °C)] 97.8 °F (36.6 °C)  Pulse:  [89-95] 95  Resp:  [20] 20  SpO2:  [94 %-99 %] 94 %  BP: (128-183)/(72-88) 150/72     Weight: 95.3 kg (210 lb)  Body mass index is 29.29 kg/m².     Physical Exam  Vitals and nursing note reviewed.   Constitutional:       General: He is not in acute distress.     Appearance: He is well-developed.   HENT:      Head: Normocephalic and atraumatic.      Right Ear: External ear normal.      Left Ear: External ear normal.      Nose: Nose normal.   Eyes:      Conjunctiva/sclera: Conjunctivae normal.   Cardiovascular:      Rate and Rhythm: Normal rate and regular rhythm.   Pulmonary:      Effort: Pulmonary effort is normal. No respiratory distress.   Abdominal:      General: There is no distension.      Palpations: Abdomen is soft.   Musculoskeletal:         General: Normal range of motion.   Skin:     General: Skin is warm and dry.   Neurological:      Mental Status: He is alert and oriented to person, place, and time.   Psychiatric:         Thought Content: Thought content normal.              Significant Labs: All pertinent labs within the past 24 hours have been reviewed.    Significant Imaging: I have reviewed all pertinent imaging results/findings within the past 24 hours.

## 2023-06-01 NOTE — PLAN OF CARE
Case Management Assessment     PCP: Domingo  Pharmacy: [facility] on [address]    Patient Arrived From: [facility]  Existing Help at Home: none    Barriers to Discharge: none    Discharge Plan:    A. Home health    B. OP Case management      SW completed brief assessment  and discussed discharge planning with patient at his bedside. Patient stated that he lives at [facility] alone. Patient shared that he has no help and the only sister he has is at [facility]. Patient would benefits from Home Health and OP Case Management.       06/01/23 1538   Discharge Planning   Assessment Type Discharge Planning Brief Assessment   Resource/Environmental Concerns none   Support Systems Family members   Equipment Currently Used at Home cane, straight;grab bar;glucometer   Current Living Arrangements apartment   Patient/Family Anticipates Transition to home with help/services   Patient/Family Anticipated Services at Transition home health care   DME Needed Upon Discharge  none   Discharge Plan A Home Health   Discharge Plan B Hospice/home;Home Health

## 2023-06-01 NOTE — ASSESSMENT & PLAN NOTE
Well controlled, hold Arb and diuretics due to renal function, continue other home medications and monitor blood pressure, adjust as needed.

## 2023-06-01 NOTE — ED PROVIDER NOTES
Encounter Date: 6/1/2023       History     Chief Complaint   Patient presents with    Chest Pain     BIB [facility]. LEFT sided since 0500 this morning. Denies SOB, N/V, dizziness, weakness. No tx used.     Back Pain     Chronic diffuse back pain. Was seen for same last week. Has not filled rx. Worse with movement.      Chief complaint:  Chest pain     History of present illness:  72-year-old male with a past medical history of CHF, coronary artery disease, diabetes mellitus, hyperlipidemia, hypertension, alcohol use disorder, rhabdomyolysis, presents emergency department for evaluation of acute on chronic thoracic back pain as well as chest pain in the left side of his chest that does not radiate and started approximally  6-1/2 hours ago.  It has been constant.  There has been shortness of breath as well.  The patient denies abdominal pain or vomiting.  He has chronic left knee swelling, for which she uses a crutch at home.  He states this has been going on for years.  It is not changed from prior and patient denies new injury to his knee.  He does state that he lost his balance and fell in his residence yesterday striking his head and hurting his thoracic back area.  He denies urinary retention but states he has a hard time getting the bathroom in time due to his back pain.  He denies any lower extremity  Weakness that is new.  Review of patient's allergies indicates:  No Known Allergies  Past Medical History:   Diagnosis Date    Acute congestive heart failure 3/20/2020    Alcohol abuse     Arthritis     Asthma attack 11/24/2021    Coronary artery disease     Diabetes mellitus     Hyperlipemia     Hypertension     Rhabdomyolysis      Past Surgical History:   Procedure Laterality Date    EYE SURGERY      TRANSESOPHAGEAL ECHOCARDIOGRAM WITH POSSIBLE CARDIOVERSION (MARIA W/ POSS CARDIOVERSION) N/A 1/5/2023    Procedure: Transesophageal echo (MARIA) intra-procedure log documentation;  Surgeon: Indra Foote MD;   Location: [facility] CATH LAB;  Service: Cardiology;  Laterality: N/A;    TREATMENT OF CARDIAC ARRHYTHMIA N/A 12/7/2020    Procedure: Cardioversion or Defibrillation;  Surgeon: Indra Foote MD;  Location: [facility] CATH LAB;  Service: Cardiology;  Laterality: N/A;    TREATMENT OF CARDIAC ARRHYTHMIA N/A 12/30/2020    Procedure: Cardioversion or Defibrillation;  Surgeon: Tyrone Steen MD;  Location: [facility] CATH LAB;  Service: Cardiology;  Laterality: N/A;    TREATMENT OF CARDIAC ARRHYTHMIA N/A 1/5/2023    Procedure: Cardioversion or Defibrillation;  Surgeon: Indra Foote MD;  Location: [facility] CATH LAB;  Service: Cardiology;  Laterality: N/A;    VASCULAR SURGERY       Family History   Problem Relation Age of Onset    Hypertension Mother     Diabetes Mother     Diabetes Father     Hypertension Father     Diabetes Sister     Hypertension Sister      Social History     Tobacco Use    Smoking status: Never    Smokeless tobacco: Never   Substance Use Topics    Alcohol use: Not Currently     Alcohol/week: 6.0 standard drinks     Types: 6 Cans of beer per week     Comment: daily    Drug use: No     Review of Systems   Constitutional:  Negative for fever.   HENT:  Negative for sore throat.    Respiratory:  Positive for shortness of breath.    Cardiovascular:  Positive for chest pain.   Gastrointestinal:  Negative for abdominal pain, constipation and nausea.   Genitourinary:  Negative for dysuria.   Musculoskeletal:  Positive for arthralgias and back pain.   Skin:  Negative for rash.   Neurological:  Negative for weakness.   Hematological:  Does not bruise/bleed easily.     Physical Exam     Initial Vitals [06/01/23 1126]   BP Pulse Resp Temp SpO2   128/78 90 20 97.8 °F (36.6 °C) 99 %      MAP       --         Physical Exam    Nursing note and vitals reviewed.  Constitutional: He is not diaphoretic. He appears distressed.   The patient is in mild distress secondary to discomfort in his back and chest.   HENT:   Head:  Normocephalic and atraumatic.   Right Ear: External ear normal.   Left Ear: External ear normal.   Nose: Nose normal.   Mouth/Throat: Oropharynx is clear and moist.   Eyes: Conjunctivae and EOM are normal. Pupils are equal, round, and reactive to light. Right eye exhibits no discharge. Left eye exhibits no discharge. No scleral icterus.   Neck: Neck supple. No JVD present.   Normal range of motion.  Cardiovascular:  Normal rate, regular rhythm, normal heart sounds and intact distal pulses.     Exam reveals no gallop and no friction rub.       No murmur heard.  Regular rate and rhythm.   Pulmonary/Chest: Breath sounds normal. No stridor. No respiratory distress. He has no wheezes. He has no rhonchi. He has no rales. He exhibits no tenderness.   Work of breathing is normal.  Breath sounds are clear bilaterally without wheezes, rales, rhonchi.   Abdominal: Abdomen is soft. Bowel sounds are normal. He exhibits no distension and no mass. There is no abdominal tenderness. There is no rebound and no guarding.   Musculoskeletal:         General: Tenderness present. No edema.      Cervical back: Normal range of motion and neck supple.      Comments: Distal lower extremities are symmetric without edema.  There is swelling around the left knee.      Neurological: He is alert and oriented to person, place, and time. He has normal strength. No cranial nerve deficit or sensory deficit.   The patient is not able to completely lift up his left leg due to chronic injury which she states is baseline.  Otherwise, strength and sensation appears to be intact in the bilateral lower extremities symmetrically.   Skin: Skin is warm and dry. No rash noted. No erythema. No pallor.   Psychiatric: Judgment normal.       ED Course   Procedures  Labs Reviewed   CBC W/ AUTO DIFFERENTIAL - Abnormal; Notable for the following components:       Result Value    WBC 17.07 (*)     MCH 26.8 (*)     Immature Granulocytes 0.8 (*)     Gran # (ANC) 14.9 (*)      Immature Grans (Abs) 0.14 (*)     Lymph # 0.4 (*)     Mono # 1.7 (*)     Gran % 86.9 (*)     Lymph % 2.1 (*)     All other components within normal limits   COMPREHENSIVE METABOLIC PANEL - Abnormal; Notable for the following components:    Chloride 93 (*)     Glucose 228 (*)     BUN 91 (*)     Creatinine 3.0 (*)     Calcium 10.6 (*)     Albumin 2.9 (*)     Total Bilirubin 2.4 (*)     Anion Gap 17 (*)     eGFR 21 (*)     All other components within normal limits   TROPONIN I - Abnormal; Notable for the following components:    Troponin I 0.091 (*)     All other components within normal limits   B-TYPE NATRIURETIC PEPTIDE - Abnormal; Notable for the following components:     (*)     All other components within normal limits   URINALYSIS, REFLEX TO URINE CULTURE - Abnormal; Notable for the following components:    Protein, UA 2+ (*)     Occult Blood UA 1+ (*)     All other components within normal limits    Narrative:     Specimen Source->Urine   URINALYSIS MICROSCOPIC - Abnormal; Notable for the following components:    RBC, UA 47 (*)     Hyaline Casts, UA 8 (*)     All other components within normal limits    Narrative:     Specimen Source->Urine   CK   LACTIC ACID, PLASMA     EKG Readings: (Independently Interpreted)   Initial Reading: No STEMI. Rhythm: Normal Sinus Rhythm.   EKG reviewed and interpreted by me shows sinus rhythm at a rate of 89 beats per minute.  There is a first-degree AV block.  There is a left bundle-branch block.  There is a left axis deviation.  There are no ST segment or T-wave ischemic findings.  When compared to EKG from January 30, 2023, left bundle-branch block previously present.     Imaging Results              CT Head Without Contrast (Final result)  Result time 06/01/23 13:27:52      Final result by Isaias Lechuga MD (06/01/23 13:27:52)                   Impression:      1.  No acute intracranial process.    2.  Changes of chronic vessel disease and cerebral volume  loss.      Electronically signed by: Isaias Lechuga MD  Date:    06/01/2023  Time:    13:27               Narrative:    EXAMINATION:  CT HEAD WITHOUT CONTRAST    CLINICAL HISTORY:  Head trauma, minor (Age >= 65y);    TECHNIQUE:  Low dose axial images were obtained through the head.  Coronal and sagittal reformations were also performed. Contrast was not administered.    COMPARISON:  05/30/2023.    FINDINGS:  There is suboccipital subcutaneous soft tissue swelling..  The bony calvarium is intact.  The paranasal sinuses are unremarkable.  The mastoid air cells are clear.  There are postoperative changes in the globes.    The craniocervical junction is intact.  The sellar and parasellar structures are unremarkable.  There is no evidence of intracranial hemorrhage.  The ventricles and sulci are prominent, consistent cerebral volume loss.  There are extensive hypodensities within the periventricular and subcortical white matter.  The gray-white differentiation is maintained.  There is no dense vessel sign.  There is no evidence of mass effect.                                       X-Ray Thoracic Spine AP Lateral (Final result)  Result time 06/01/23 12:44:04      Final result by Juan Daniel Aceves III, MD (06/01/23 12:44:04)                   Impression:      Degenerative change.      Electronically signed by: Juan Daniel Aceves MD  Date:    06/01/2023  Time:    12:44               Narrative:    EXAMINATION:  XR THORACIC SPINE AP LATERAL    CLINICAL HISTORY:  Unspecified injury of lower back, initial encounter    FINDINGS:  There is baseline DJD and dish.  No acute fracture dislocation bone destruction seen.  No acute trauma seen.                                       X-Ray Chest AP Portable (Final result)  Result time 06/01/23 12:43:28      Final result by Juan Daniel Aceves III, MD (06/01/23 12:43:28)                   Impression:      Mild cardiomegaly and possible mild CHF.      Electronically signed by: Juan Daniel Aceves  MD  Date:    06/01/2023  Time:    12:43               Narrative:    EXAMINATION:  XR CHEST AP PORTABLE    CLINICAL HISTORY:  Chest pain, unspecified    FINDINGS:  Chest one view portable.    There is mild cardiomegaly.  Lungs are clear and the bones bowel gas are noncontributory.                                       X-Ray Lumbar Spine Ap And Lateral (Final result)  Result time 06/01/23 12:41:15      Final result by Juan Daniel Aceves III, MD (06/01/23 12:41:15)                   Impression:      No acute process seen.      Electronically signed by: Juan Daniel Aceves MD  Date:    06/01/2023  Time:    12:41               Narrative:    EXAMINATION:  XR LUMBAR SPINE AP AND LATERAL    CLINICAL HISTORY:  back injury;    FINDINGS:  There is mild-moderate DJD and dish.  No fracture dislocation bone destruction seen.  No acute trauma seen.                                       X-Ray Knee 1 or 2 View Left (Final result)  Result time 06/01/23 12:40:24   Procedure changed from X-Ray Knee 3 View Left     Final result by Juan Daniel Aceves III, MD (06/01/23 12:40:24)                   Narrative:    EXAMINATION:  XR KNEE 1 OR 2 VIEW LEFT    CLINICAL HISTORY:  knee pain;  Pain in unspecified knee    FINDINGS:  There is DJD of the knee with a varus deformity.  There are bone infarcts of the distal femur and proximal tibia.  No fracture dislocation bone destruction seen.      Electronically signed by: Juan Daniel Aceves MD  Date:    06/01/2023  Time:    12:40                                  X-Rays:   Independently Interpreted Readings:   Other Readings:  CT head reveals no evidence of intracranial hemorrhage or skull fracture.  There is mild cardiomegaly on chest x-ray.  No acute bony abnormalities on thoracic and lumbar x-rays.  Medications - No data to display  Medical Decision Making:   ED Management:  This is the emergent evaluation of a 72-year-old male presents emergency department for evaluation of acute on chronic back pain after  injury as well as injury to his head and chest pain for the past 6.5 hours.  Differential diagnosis at the time of initial evaluation included, but was not limited to:    Acute myocardial infarction, acute coronary syndrome, pericarditis, myocarditis, pneumonia, pneumothorax, gastroesophageal reflux disease, pleurisy, costochondritis.  I considered but doubt aortic dissection.  I also considered compression fractures of the thoracic and lumbar spine and intracranial hemorrhage given recent fall.  Patient's chest x-ray reveals findings of cardiomegaly.  There may be mild CHF.  There are no bony abnormalities on thoracic or lumbar x-rays.  The patient has left knee swelling but he states that this is baseline for him.  He does have a history of gout in that knee.  He is able to bend the knee.  He does not have any urinary retention or bowel symptoms that are new or any lower extremity paresthesias or strength deficits compared to baseline.  I do not suspect cauda equina syndrome.  This patient had a CT of the head did not reveal intracranial hemorrhage or skull fracture.  His laboratory evaluation is concerning for acute on chronic elevated troponin higher than baseline as well as acute kidney injury on CKD.  He is uremic.  He does not appear to be grossly encephalopathic.  No convincing evidence of urinary tract infection.  He does have a leukocytosis but no lactic acidosis.  No foci of infection by history or physical examination.  Given all the above, patient likely needs admission for gentle hydration and troponin trending.  I discussed the case with the observation hospitalist team.  Patient has been updated.                        Clinical Impression:   Final diagnoses:  [R07.9] Chest pain  [M25.569] Knee pain  [S39.92XA] Back injury        ED Disposition Condition    Observation                 Brian Hernandez Jr., MD  06/01/23 7868

## 2023-06-01 NOTE — ED TRIAGE NOTES
Patient complaining of back and chest pain. Pt was in ED recently and had fluid drained from his knee and refills on his prescriptions. Pt states he could not get his meds from pharmacy because no ride and no money. Pt has hx of HTN and DM. Pt had fall last night where he hit his head and back but denies LOC. Pt denies N/V/D, dizziness, and SOB. Placed on cardiac monitior.

## 2023-06-01 NOTE — HPI
72 y.o. male with HTN, HLD, CAD, DM2, BPH, PAF, CKD stage IV, and chronic diastolic heart failure presents with a complaint of chest pain.  Acute onset this morning, located left chest, associated with shortness of breath, constant, does not radiate.  Denies fever, chills, cough, palpitations, dizziness, syncope, n/v/d, or abdominal pain.  Does additionally report back pain which is chronic.  In the ED, EKG without evidence of acute ischemia, initial troponin 0.09 which is typical of his usual baseline.  Labs show ELIZABETH and leukocytosis, .  Otherwise unremarkable for acute abnormality.  Do evidence of infectious source and no other signs of infection such as fever.  Placed in observation for gentle hydration and serial troponin.

## 2023-06-01 NOTE — ASSESSMENT & PLAN NOTE
Cr/BUN acutely worse than baseline, continue cautious gentle IV fluid hydration given heart failure, strict I/O's, monitor renal function and electrolytes, avoid nephrotoxic agents.

## 2023-06-02 VITALS
RESPIRATION RATE: 18 BRPM | HEIGHT: 71 IN | DIASTOLIC BLOOD PRESSURE: 72 MMHG | WEIGHT: 222.69 LBS | OXYGEN SATURATION: 97 % | HEART RATE: 88 BPM | BODY MASS INDEX: 31.18 KG/M2 | SYSTOLIC BLOOD PRESSURE: 119 MMHG | TEMPERATURE: 98 F

## 2023-06-02 LAB
ALBUMIN SERPL BCP-MCNC: 2.5 G/DL (ref 3.5–5.2)
ALP SERPL-CCNC: 57 U/L (ref 55–135)
ALT SERPL W/O P-5'-P-CCNC: 13 U/L (ref 10–44)
ANION GAP SERPL CALC-SCNC: 11 MMOL/L (ref 8–16)
AST SERPL-CCNC: 14 U/L (ref 10–40)
BASOPHILS # BLD AUTO: 0.03 K/UL (ref 0–0.2)
BASOPHILS NFR BLD: 0.3 % (ref 0–1.9)
BILIRUB SERPL-MCNC: 1.6 MG/DL (ref 0.1–1)
BUN SERPL-MCNC: 101 MG/DL (ref 8–23)
CALCIUM SERPL-MCNC: 9.7 MG/DL (ref 8.7–10.5)
CHLORIDE SERPL-SCNC: 95 MMOL/L (ref 95–110)
CO2 SERPL-SCNC: 30 MMOL/L (ref 23–29)
CREAT SERPL-MCNC: 2.9 MG/DL (ref 0.5–1.4)
DIFFERENTIAL METHOD: ABNORMAL
EOSINOPHIL # BLD AUTO: 0.1 K/UL (ref 0–0.5)
EOSINOPHIL NFR BLD: 0.5 % (ref 0–8)
ERYTHROCYTE [DISTWIDTH] IN BLOOD BY AUTOMATED COUNT: 13.8 % (ref 11.5–14.5)
EST. GFR  (NO RACE VARIABLE): 22 ML/MIN/1.73 M^2
GLUCOSE SERPL-MCNC: 192 MG/DL (ref 70–110)
HCT VFR BLD AUTO: 37.9 % (ref 40–54)
HGB BLD-MCNC: 12.1 G/DL (ref 14–18)
IMM GRANULOCYTES # BLD AUTO: 0.08 K/UL (ref 0–0.04)
IMM GRANULOCYTES NFR BLD AUTO: 0.7 % (ref 0–0.5)
LYMPHOCYTES # BLD AUTO: 0.6 K/UL (ref 1–4.8)
LYMPHOCYTES NFR BLD: 5.3 % (ref 18–48)
MCH RBC QN AUTO: 26.3 PG (ref 27–31)
MCHC RBC AUTO-ENTMCNC: 31.9 G/DL (ref 32–36)
MCV RBC AUTO: 82 FL (ref 82–98)
MONOCYTES # BLD AUTO: 1.3 K/UL (ref 0.3–1)
MONOCYTES NFR BLD: 11.2 % (ref 4–15)
NEUTROPHILS # BLD AUTO: 9.8 K/UL (ref 1.8–7.7)
NEUTROPHILS NFR BLD: 82 % (ref 38–73)
NRBC BLD-RTO: 0 /100 WBC
PLATELET # BLD AUTO: 281 K/UL (ref 150–450)
PMV BLD AUTO: 9.8 FL (ref 9.2–12.9)
POCT GLUCOSE: 176 MG/DL (ref 70–110)
POCT GLUCOSE: 178 MG/DL (ref 70–110)
POTASSIUM SERPL-SCNC: 3.4 MMOL/L (ref 3.5–5.1)
PROT SERPL-MCNC: 6.8 G/DL (ref 6–8.4)
RBC # BLD AUTO: 4.6 M/UL (ref 4.6–6.2)
SODIUM SERPL-SCNC: 136 MMOL/L (ref 136–145)
URATE SERPL-MCNC: 12.7 MG/DL (ref 3.4–7)
WBC # BLD AUTO: 11.98 K/UL (ref 3.9–12.7)

## 2023-06-02 PROCEDURE — 97110 THERAPEUTIC EXERCISES: CPT

## 2023-06-02 PROCEDURE — 86580 TB INTRADERMAL TEST: CPT | Performed by: INTERNAL MEDICINE

## 2023-06-02 PROCEDURE — G0378 HOSPITAL OBSERVATION PER HR: HCPCS

## 2023-06-02 PROCEDURE — 97535 SELF CARE MNGMENT TRAINING: CPT

## 2023-06-02 PROCEDURE — 80053 COMPREHEN METABOLIC PANEL: CPT | Performed by: HOSPITALIST

## 2023-06-02 PROCEDURE — 97165 OT EVAL LOW COMPLEX 30 MIN: CPT

## 2023-06-02 PROCEDURE — 25000003 PHARM REV CODE 250: Performed by: HOSPITALIST

## 2023-06-02 PROCEDURE — 97530 THERAPEUTIC ACTIVITIES: CPT

## 2023-06-02 PROCEDURE — 85025 COMPLETE CBC W/AUTO DIFF WBC: CPT | Performed by: HOSPITALIST

## 2023-06-02 PROCEDURE — 96361 HYDRATE IV INFUSION ADD-ON: CPT

## 2023-06-02 PROCEDURE — 36415 COLL VENOUS BLD VENIPUNCTURE: CPT | Performed by: HOSPITALIST

## 2023-06-02 PROCEDURE — 36415 COLL VENOUS BLD VENIPUNCTURE: CPT | Performed by: INTERNAL MEDICINE

## 2023-06-02 PROCEDURE — 97161 PT EVAL LOW COMPLEX 20 MIN: CPT

## 2023-06-02 PROCEDURE — 84550 ASSAY OF BLOOD/URIC ACID: CPT | Performed by: INTERNAL MEDICINE

## 2023-06-02 PROCEDURE — 30200315 PPD INTRADERMAL TEST REV CODE 302: Performed by: INTERNAL MEDICINE

## 2023-06-02 RX ADMIN — INSULIN ASPART 2 UNITS: 100 INJECTION, SOLUTION INTRAVENOUS; SUBCUTANEOUS at 11:06

## 2023-06-02 RX ADMIN — APIXABAN 5 MG: 5 TABLET, FILM COATED ORAL at 10:06

## 2023-06-02 RX ADMIN — SODIUM CHLORIDE: 9 INJECTION, SOLUTION INTRAVENOUS at 05:06

## 2023-06-02 RX ADMIN — ACETAMINOPHEN 650 MG: 325 TABLET ORAL at 10:06

## 2023-06-02 RX ADMIN — TUBERCULIN PURIFIED PROTEIN DERIVATIVE 5 UNITS: 5 INJECTION, SOLUTION INTRADERMAL at 12:06

## 2023-06-02 NOTE — PLAN OF CARE
Referral for [facility] and [facility] care for review and will follow for response.

## 2023-06-02 NOTE — PLAN OF CARE
[facility] [place] - Telemetry      HOME HEALTH ORDERS  FACE TO FACE ENCOUNTER    Patient Name: Chato Bowie  YOB: 1950    PCP: Irlanda Valenzuela   PCP Address: [address]  PCP Phone Number: [phone]  PCP Fax: [phone]    Encounter Date: 6/1/23    Admit to Home Health    Diagnoses: Weakness  Active Hospital Problems    Diagnosis  POA    *Acute kidney injury superimposed on chronic kidney disease [N17.9, N18.9]  Yes     Priority: 1 - High    Type 2 diabetes mellitus with kidney complication, with long-term current use of insulin [E11.29, Z79.4]  Not Applicable     Priority: 2      Chronic    Chronic diastolic heart failure [I50.32]  Yes     Chronic    Paroxysmal atrial flutter [I48.92]  Yes     Chronic    Chest pain [R07.9]  Yes    Essential hypertension [I10]  Yes     Chronic    Hyperlipidemia [E78.5]  Yes     Chronic    CAD (coronary artery disease) [I25.10]  Yes     Chronic      Resolved Hospital Problems   No resolved problems to display.       Follow Up Appointments:  No future appointments.    Allergies:Review of patient's allergies indicates:  No Known Allergies    Medications: Review discharge medications with patient and family and provide education.    Current Facility-Administered Medications   Medication Dose Route Frequency Provider Last Rate Last Admin    0.9%  NaCl infusion   Intravenous Continuous Alex Leonard Jr., NP 75 mL/hr at 06/02/23 0538 New Bag at 06/02/23 0538    acetaminophen tablet 650 mg  650 mg Oral Q6H PRN Alex Leonard Jr. NP   650 mg at 06/02/23 1010    aluminum-magnesium hydroxide-simethicone 200-200-20 mg/5 mL suspension 30 mL  30 mL Oral QID PRN Alex Leonard Jr., NP        amLODIPine tablet 10 mg  10 mg Oral Daily Alex Leonard Jr. NP   10 mg at 06/01/23 1602    apixaban tablet 5 mg  5 mg Oral BID Alex Leonard Jr. NP   5 mg at 06/02/23 1010    dextrose 10% bolus 125 mL 125 mL  12.5 g Intravenous PRN Alex Leonard Jr., NP         dextrose 10% bolus 250 mL 250 mL  25 g Intravenous PRN Alex Leonard Jr., NP        dextrose 40 % gel 15,000 mg  15 g Oral PRN Alex Leonard Jr., NP        dextrose 40 % gel 30,000 mg  30 g Oral PRN Alex Leonard Jr., NP        glucagon (human recombinant) injection 1 mg  1 mg Intramuscular PRN Alex Leonard Jr., NP        insulin aspart U-100 pen 1-10 Units  1-10 Units Subcutaneous QID (AC + HS) PRN Alex Leonard Jr., NP   2 Units at 06/01/23 2146    melatonin tablet 6 mg  6 mg Oral Nightly PRN Alex Leonard Jr., NP   6 mg at 06/01/23 2030    naloxone 0.4 mg/mL injection 0.02 mg  0.02 mg Intravenous PRN Alex Leonard Jr., NP        ondansetron injection 4 mg  4 mg Intravenous Q8H PRN Alex Leonard Jr., NP        polyethylene glycol packet 17 g  17 g Oral Daily Alex Leonard Jr., NP        prochlorperazine injection Soln 5 mg  5 mg Intravenous Q6H PRN Alex Leonard Jr., NP        simethicone chewable tablet 80 mg  1 tablet Oral QID PRN Alex Leonard Jr., NP        sodium chloride 0.9% flush 10 mL  10 mL Intravenous Q8H PRN Alex Leonard Jr., NP        tiZANidine tablet 4 mg  4 mg Oral Q8H PRN Alex Leonard Jr., NP   4 mg at 06/01/23 1850    tuberculin injection 5 Units  5 Units Intradermal Once Raymond Cuellar MD         Current Discharge Medication List        CONTINUE these medications which have NOT CHANGED    Details   albuterol (PROVENTIL/VENTOLIN HFA) 90 mcg/actuation inhaler Inhale 2 puffs into the lungs every 4 (four) hours as needed.  Qty: 8.5 g, Refills: 5      amiodarone (PACERONE) 200 MG Tab Take 2 tablets (400 mg total) by mouth 2 (two) times daily for 7 days, THEN 1 tablet (200 mg total) 2 (two) times daily for 7 days, THEN 1 tablet (200 mg total) once daily.  Qty: 132 tablet, Refills: 0      amLODIPine (NORVASC) 10 MG tablet Take 10 mg by mouth Daily.      diclofenac sodium (VOLTAREN) 1 % Gel Apply 2 g topically 2 (two) times daily as needed (pain).  Qty: 100 g, Refills: 0       ELIQUIS 5 mg Tab Take 5 mg by mouth 2 (two) times daily.      furosemide (LASIX) 40 MG tablet Take 80 mg by mouth 2 (two) times daily.      hydrALAZINE (APRESOLINE) 50 MG tablet Take 1.5 tablets (75 mg total) by mouth 3 (three) times daily.  Qty: 90 tablet, Refills: 1    Comments: .      insulin aspart U-100 (NOVOLOG) 100 unit/mL (3 mL) InPn pen Inject 5 Units into the skin 3 (three) times daily.  Qty: 4.5 mL, Refills: 11      losartan-hydrochlorothiazide 50-12.5 mg (HYZAAR) 50-12.5 mg per tablet Take 1 tablet by mouth once daily.      magnesium oxide (MAG-OX) 400 mg (241.3 mg magnesium) tablet Take 800 mg by mouth.      metOLazone (ZAROXOLYN) 5 MG tablet TAKE 1 TABLET BY MOUTH 3 TIMES A WEEK AS NEEDED FOR SEVERE LEG SWELLING      metoprolol succinate (TOPROL-XL) 25 MG 24 hr tablet Take 25 mg by mouth.      multivitamin Tab Take 1 tablet by mouth once daily.  Qty:        NOVOLIN 70/30 U-100 INSULIN 100 unit/mL (70-30) injection Inject 55 Units into the skin 2 (two) times daily.      potassium chloride (KLOR-CON) 10 MEQ TbSR Take 10 mEq by mouth once daily.      rosuvastatin (CRESTOR) 40 MG Tab Take 40 mg by mouth every evening.      sildenafiL (VIAGRA) 100 MG tablet TAKE 1 TABLET BY MOUTH ONCE DAILY AS NEEDED FOR ERECTILE DYSFUNCTION.  Qty: 30 tablet, Refills: 0      tamsulosin (FLOMAX) 0.4 mg Cap Take 1 capsule (0.4 mg total) by mouth once daily.  Qty: 30 capsule, Refills: 11               I have seen and examined this patient within the last 30 days. My clinical findings that support the need for the home health skilled services and home bound status are the following:no   Weakness/numbness causing balance and gait disturbance due to Weakness/Debility making it taxing to leave home.     Diet:   diabetic diet 2000 calorie and 2 gram sodium diet        Referrals/ Consults  Physical Therapy to evaluate and treat. Evaluate for home safety and equipment needs; Establish/upgrade home exercise program. Perform /  instruct on therapeutic exercises, gait training, transfer training, and Range of Motion.  Occupational Therapy to evaluate and treat. Evaluate home environment for safety and equipment needs. Perform/Instruct on transfers, ADL training, ROM, and therapeutic exercises.  Aide to provide assistance with personal care, ADLs, and vital signs.   consult for NH placement     Activities:   activity as tolerated    Nursing:   Agency to admit patient within 24 hours of hospital discharge unless specified on physician order or at patient request    SN to complete comprehensive assessment including routine vital signs. Instruct on disease process and s/s of complications to report to MD. Review/verify medication list sent home with the patient at time of discharge  and instruct patient/caregiver as needed. Frequency may be adjusted depending on start of care date.     Skilled nurse to perform up to 3 visits PRN for symptoms related to diagnosis    Notify MD if SBP > 160 or < 90; DBP > 90 or < 50; HR > 120 or < 50; Temp > 101; O2 < 88%; Other:      Ok to schedule additional visits based on staff availability and patient request on consecutive days within the home health episode.    When multiple disciplines ordered:    Start of Care occurs on Sunday - Wednesday schedule remaining discipline evaluations as ordered on separate consecutive days following the start of care.    Thursday SOC -schedule subsequent evaluations Friday and Monday the following week.     Friday - Saturday SOC - schedule subsequent discipline evaluations on consecutive days starting Monday of the following week.    For all post-discharge communication and subsequent orders please contact patient's primary care physician.     I certify that this patient is confined to his home and needs intermittent skilled nursing care, physical therapy, and occupational therapy.

## 2023-06-02 NOTE — SUBJECTIVE & OBJECTIVE
Interval History: No new issues. No CP       Review of Systems   Constitutional:  Negative for activity change.   HENT:  Negative for congestion.    Respiratory:  Negative for chest tightness.    Genitourinary:  Negative for difficulty urinating.   Neurological:  Negative for dizziness.   Objective:     Vital Signs (Most Recent):  Temp: 97.5 °F (36.4 °C) (06/02/23 0739)  Pulse: 65 (06/02/23 0739)  Resp: 19 (06/02/23 0739)  BP: 110/66 (06/02/23 0739)  SpO2: 100 % (06/02/23 0739) Vital Signs (24h Range):  Temp:  [97.5 °F (36.4 °C)-98.4 °F (36.9 °C)] 97.5 °F (36.4 °C)  Pulse:  [61-95] 65  Resp:  [18-20] 19  SpO2:  [94 %-100 %] 100 %  BP: (110-183)/(56-88) 110/66     Weight: 101 kg (222 lb 10.6 oz)  Body mass index is 31.06 kg/m².    Intake/Output Summary (Last 24 hours) at 6/2/2023 0919  Last data filed at 6/1/2023 2234  Gross per 24 hour   Intake 480 ml   Output 700 ml   Net -220 ml         Physical Exam  Cardiovascular:      Rate and Rhythm: Normal rate.   Pulmonary:      Effort: Pulmonary effort is normal. No respiratory distress.   Neurological:      Mental Status: He is alert and oriented to person, place, and time.           Significant Labs: All pertinent labs within the past 24 hours have been reviewed.  BMP:   Recent Labs   Lab 06/02/23 0429   *      K 3.4*   CL 95   CO2 30*   *   CREATININE 2.9*   CALCIUM 9.7     CBC:   Recent Labs   Lab 06/01/23  1215 06/02/23  0429   WBC 17.07* 11.98   HGB 14.0 12.1*   HCT 43.6 37.9*    281       Significant Imaging:

## 2023-06-02 NOTE — CONSULTS
"Met with pt at bedside to discuss consult for NH placement.  At this time TN inquired on information in assessment which states that he is at a group home.  Pt reports that he is at an "old folks" apartment complex.  TN and Dr Cuellar at bedside pt advised that PT/OT will see pt again on today and that he will d/c to home with home health that will include a SW and OP CM to assist in NH placement if that is needed in future. Per conversation with pt he reports that he has not walked in months and has all DME needed at home already.  TN advised pt of requirements to go to NH ex. Cost, financial information needed.  At this time pt states that he will need to think about if going to a NH is what he needs and is ready to do. He states that he has no bank account and only way he gets his money is on a card that he has to go to the [employer] to get money as needed.  He also states that he does not have the card with him and that he recently had to get a new card and has not been activated. Unaware of how much money he has on his card if any.        PASRR completed and LOCET called in awaiting 142 to come via email to assist in placement at later date if needed. Received message from [facility] with letter of consideration reading that the pt has a history of mental illness and level 2 is needed for NH placement and pt will require a behavioral health screening       "

## 2023-06-02 NOTE — PLAN OF CARE
Problem: Adult Inpatient Plan of Care  Goal: Plan of Care Review  6/2/2023 1335 by Hannah Lopez RN  Outcome: Ongoing, Progressing  6/2/2023 0737 by Hannah Lopez RN  Outcome: Ongoing, Progressing  Goal: Patient-Specific Goal (Individualized)  6/2/2023 1335 by Hannah Lopez RN  Outcome: Ongoing, Progressing  6/2/2023 0737 by Hannah Lopez RN  Outcome: Ongoing, Progressing  Goal: Absence of Hospital-Acquired Illness or Injury  6/2/2023 1335 by Hannah Lopez RN  Outcome: Ongoing, Progressing  6/2/2023 0737 by Hannah Lopez RN  Outcome: Ongoing, Progressing  Goal: Optimal Comfort and Wellbeing  6/2/2023 1335 by Hannah Lopez RN  Outcome: Ongoing, Progressing  6/2/2023 0737 by Hannah Lopez RN  Outcome: Ongoing, Progressing  Goal: Readiness for Transition of Care  6/2/2023 1335 by Hannah Lopez RN  Outcome: Ongoing, Progressing  6/2/2023 0737 by Hannah Lopez RN  Outcome: Ongoing, Progressing     Problem: Infection  Goal: Absence of Infection Signs and Symptoms  6/2/2023 1335 by Hannah Lopez RN  Outcome: Ongoing, Progressing  6/2/2023 0737 by Hannah Lopez RN  Outcome: Ongoing, Progressing     Problem: Fall Injury Risk  Goal: Absence of Fall and Fall-Related Injury  6/2/2023 1335 by Hannah Lopez RN  Outcome: Ongoing, Progressing  6/2/2023 0737 by Hannah Lopez RN  Outcome: Ongoing, Progressing     Problem: Skin Injury Risk Increased  Goal: Skin Health and Integrity  6/2/2023 1335 by Hannah Lopez RN  Outcome: Ongoing, Progressing  6/2/2023 0737 by Hannah Lopez RN  Outcome: Ongoing, Progressing     Problem: Diabetes Comorbidity  Goal: Blood Glucose Level Within Targeted Range  6/2/2023 1335 by Hannah Lopez RN  Outcome: Ongoing, Progressing  6/2/2023 0737 by Hannah Lopez RN  Outcome: Ongoing, Progressing     Problem: Adjustment to Illness (Sepsis/Septic Shock)  Goal: Optimal Coping  6/2/2023 1335 by Hannah Lopez RN  Outcome: Ongoing,  Progressing  6/2/2023 0737 by Hannah Lopez RN  Outcome: Ongoing, Progressing     Problem: Bleeding (Sepsis/Septic Shock)  Goal: Absence of Bleeding  6/2/2023 1335 by Hannah Lopez RN  Outcome: Ongoing, Progressing  6/2/2023 0737 by Hannah Lopez RN  Outcome: Ongoing, Progressing     Problem: Glycemic Control Impaired (Sepsis/Septic Shock)  Goal: Blood Glucose Level Within Desired Range  6/2/2023 1335 by Hannah Lopez RN  Outcome: Ongoing, Progressing  6/2/2023 0737 by Hannah Lopez RN  Outcome: Ongoing, Progressing     Problem: Infection Progression (Sepsis/Septic Shock)  Goal: Absence of Infection Signs and Symptoms  6/2/2023 1335 by Hannah Lopez RN  Outcome: Ongoing, Progressing  6/2/2023 0737 by Hannah Lopez RN  Outcome: Ongoing, Progressing     Problem: Nutrition Impaired (Sepsis/Septic Shock)  Goal: Optimal Nutrition Intake  6/2/2023 1335 by Hannah Lopez RN  Outcome: Ongoing, Progressing  6/2/2023 0737 by Hannah Lopez RN  Outcome: Ongoing, Progressing     Problem: Fluid and Electrolyte Imbalance (Acute Kidney Injury/Impairment)  Goal: Fluid and Electrolyte Balance  6/2/2023 1335 by Hannah Lopez RN  Outcome: Ongoing, Progressing  6/2/2023 0737 by Hannah Lopez RN  Outcome: Ongoing, Progressing     Problem: Oral Intake Inadequate (Acute Kidney Injury/Impairment)  Goal: Optimal Nutrition Intake  6/2/2023 1335 by Hannah Lopez RN  Outcome: Ongoing, Progressing  6/2/2023 0737 by Hannah Lopez RN  Outcome: Ongoing, Progressing     Problem: Renal Function Impairment (Acute Kidney Injury/Impairment)  Goal: Effective Renal Function  6/2/2023 1335 by Hannah Lopez RN  Outcome: Ongoing, Progressing  6/2/2023 0737 by Hannah Lopez RN  Outcome: Ongoing, Progressing

## 2023-06-02 NOTE — ASSESSMENT & PLAN NOTE
Cr/BUN acutely worse than baseline, continue cautious gentle IV fluid hydration given heart failure, strict I/O's, monitor renal function and electrolytes, avoid nephrotoxic agents.     Improved

## 2023-06-02 NOTE — HOSPITAL COURSE
Patient placed in obs for ELIZABETH. Started on gentle hydration with improvement back to baseline.  Patient was discharged to home on 6/2/23. Activity as tolerated. Diet-ADA 2000 juan j low NA diet. Follow up with PCP in one week

## 2023-06-02 NOTE — NURSING
Bedside handoff received from nurse Letty RN.   Patient lying in bed without any acute distress noted at this time. Safety precautions maintained.

## 2023-06-02 NOTE — PT/OT/SLP EVAL
"Occupational Therapy   Evaluation/Treatment    Name: Chato Bowie  MRN: [id number]  Admitting Diagnosis: Acute kidney injury superimposed on chronic kidney disease  Recent Surgery: * No surgery found *      Recommendations:     Discharge Recommendations: nursing facility, skilled  Discharge Equipment Recommendations:  bedside commode, bath bench  Barriers to discharge:   (The patient is unable to ambulate or transfer to the chair 2* knee and back pain and requires assist with all self care. The pateint is not safe to D/C home alone without assistance.)    Assessment:     Chato Bowie is a 72 y.o. male with a medical diagnosis of Acute kidney injury superimposed on chronic kidney disease.   Performance deficits affecting function: weakness, impaired endurance, impaired sensation, impaired self care skills, impaired functional mobility, gait instability, impaired balance, decreased coordination, decreased upper extremity function, decreased lower extremity function, decreased safety awareness, pain, decreased ROM, edema.    The patient's ability to perform bed mobility and transfers was limited by c/o 10/10 knee and back pain. The patient currently requires assist for self care and reports being unable to return to his apt alone and reports having no assistance. The patient was previously able to amb using a RW on previous admission.The patient will benefit from SNF to maximize the patient's ability to return home safely.    Rehab Prognosis: Good and Fair; patient would benefit from acute skilled OT services to address these deficits and reach maximum level of function.       Plan:     Patient to be seen 5 x/week to address the above listed problems via self-care/home management, therapeutic activities, therapeutic exercises  Plan of Care Expires: 06/16/23  Plan of Care Reviewed with: patient    Subjective     Chief Complaint: "I've never felt this sick. I would rather die than return to my apt"  Patient/Family " Comments/goals: unable to return to his apt    Occupational Profile:  Living Environment: lives alone in a senior apt on the 3rd floor with an elevator  Previous level of function: The patient used a rollator or cane to amb but recently reports using a scooter to get around. The patient bathes and dresses himself with difficulty and sponge bathes  Roles and Routines: uses a taxi for groceries as able but reports recently not able to go to the store. The patient's sister was his main support but [facility]  Equipment Used at Home: cane, straight, grab bar, rollator (scooter)  Assistance upon Discharge: reports having no support    Pain/Comfort:  Pain Rating 1: 10/10  Location - Side 1: Left  Location 1: knee (and back)  Pain Addressed 1: Reposition, Distraction, Cessation of Activity, Nurse notified (pain meds requested and received during OT tx)  Pain Rating Post-Intervention 1: 10/10    Patients cultural, spiritual, Pentec[employer]al conflicts given the current situation: no    Objective:     Communicated with: Hannah banuelos prior to session.  Patient found HOB elevated with bed alarm, peripheral IV, telemetry upon OT entry to room.    General Precautions: Standard, fall  Orthopedic Precautions: N/A  Braces: N/A  Respiratory Status: Room air    Occupational Performance:    Bed Mobility:    Patient completed Scooting/Bridging with stand by assistance and contact guard assistance  Patient completed Supine to Sit with minimum assistance and with leg lift  Patient completed Sit to Supine with moderate assistance and with leg lift    Functional Mobility/Transfers:  Patient completed Sit <> Stand Transfer with maximal assistance  with  rolling walker   Functional Mobility: The patient required max assist to stand from an elevated bed with max VC for hand placement on the RW and bed. The patient leaned on his right forearm on the RW cross bar to attempt to stand. The patient was unable to achieve a full  "stand with forward flexed trunk and crying with pain.  The patient scooted along the EOB with VC and CGA and scooted to the top of the bed with CGA with bed in trendelenberg and verbal cues to bend knees and use the head board with BUE.    Activities of Daily Living:  Upper Body Dressing: moderate assistance to don/doff back gown (limited by IV to the LUE)  Lower Body Dressing: dependence to don B socks  Toileting: found with wet sheet but denied need to use urinate      Cognitive/Visual Perceptual:  Cognitive/Psychosocial Skills:     -       Oriented to: Person, Place, and Situation   -       Follows Commands/attention:Follows two-step commands  -       Communication: clear/fluent  -       Memory: No Deficits noted  -       Safety awareness/insight to disability: impaired   -       Mood/Affect/Coping skills/emotional control: crying and tearful with c/o pain  Visual/Perceptual:      -Intact         Physical Exam:  Balance: -       good sitting, fair- stand  Postural examination/scapula alignment:    -       Rounded shoulders  -       Forward head  Skin integrity: Visible skin intact  Edema:  Moderate left knee  Sensation:    -       Impaired  tingling in B feet  Upper Extremity Range of Motion:     -       Right Upper Extremity: WFL  -       Left Upper Extremity: WFL  Upper Extremity Strength:    -       Right Upper Extremity: WFL  -       Left Upper Extremity: WFL   Strength:    -       Right Upper Extremity: WFL  -       Left Upper Extremity: WFL    AMPAC 6 Click ADL:  AMPAC Total Score: 18    Treatment & Education:  Participated in OT eval   Educated the patient re: OT role and POC  Performed self care and functional mobility as noted above. Allowed the patient time to rest on the EOB 2* left knee and back pain with mobility.  Discussed DME needs and assistance available at home. The patient was tearful/crying with c/o "I've never felt this sick. I would rather die than return to my apt".  The patient " reports his sister is now in a NH and is unable to assist him.      Patient left HOB elevated with all lines intact, call button in reach, bed alarm on, nurse and Dr Cuellar notified, and ELTON Pool present    GOALS:   Multidisciplinary Problems       Occupational Therapy Goals          Problem: Occupational Therapy    Goal Priority Disciplines Outcome Interventions   Occupational Therapy Goal     OT, PT/OT Ongoing, Progressing    Description: Goals to be met by: 6/16/23     Patient will increase functional independence with ADLs by performing:    UE Dressing with Modified Colony.  LE Dressing with Modified Colony and Supervision.  Grooming while standing at sink with Modified Colony, Supervision, and Assistive Devices as needed.  Toileting from toilet with Modified Colony, Set-up Assistance, and Assistive Devices as needed for hygiene and clothing management.   Rolling to Bilateral with Modified Colony.   Supine to sit with Modified Colony and Supervision.  Step transfer with Modified Colony  Toilet transfer to toilet with Modified Colony and Supervision.  Upper extremity exercise program x15 reps per handout, with independence.                         History:     Past Medical History:   Diagnosis Date    Acute congestive heart failure 3/20/2020    Alcohol abuse     Arthritis     Asthma attack 11/24/2021    Coronary artery disease     Diabetes mellitus     Hyperlipemia     Hypertension     Rhabdomyolysis          Past Surgical History:   Procedure Laterality Date    EYE SURGERY      TRANSESOPHAGEAL ECHOCARDIOGRAM WITH POSSIBLE CARDIOVERSION (MARIA W/ POSS CARDIOVERSION) N/A 1/5/2023    Procedure: Transesophageal echo (MARIA) intra-procedure log documentation;  Surgeon: Indra Foote MD;  Location: [facility] CATH LAB;  Service: Cardiology;  Laterality: N/A;    TREATMENT OF CARDIAC ARRHYTHMIA N/A 12/7/2020    Procedure: Cardioversion or Defibrillation;  Surgeon: Indra  ZACHARY Foote MD;  Location: [facility] CATH LAB;  Service: Cardiology;  Laterality: N/A;    TREATMENT OF CARDIAC ARRHYTHMIA N/A 12/30/2020    Procedure: Cardioversion or Defibrillation;  Surgeon: Tyrone Steen MD;  Location: [facility] CATH LAB;  Service: Cardiology;  Laterality: N/A;    TREATMENT OF CARDIAC ARRHYTHMIA N/A 1/5/2023    Procedure: Cardioversion or Defibrillation;  Surgeon: Indra Foote MD;  Location: [facility] CATH LAB;  Service: Cardiology;  Laterality: N/A;    VASCULAR SURGERY         Time Tracking:     OT Date of Treatment: 06/02/23  OT Start Time: 0942  OT Stop Time: 1020  OT Total Time (min): 38 min    Billable Minutes:Evaluation 15  Self Care/Home Management 15  Therapeutic Activity 8  Total Time 38    6/2/2023

## 2023-06-02 NOTE — PT/OT/SLP PROGRESS
Physical Therapy      Patient Name:  Chato Bowie   MRN:  [id number]    Patient not seen today secondary to  (PT order cancelled by Dr. Cuellar Prior to Initial eval being conducted.).

## 2023-06-02 NOTE — NURSING
Report given to oncoming nurse. Patient resting in bed with no acute distress noted.  Call bell in reach, spouse remains at bedside.

## 2023-06-02 NOTE — NURSING
[facility]  Nurses Note -- 4 Eyes        Skin assessed on: Q Shift      [x] No Pressure Injuries Present    [x]Prevention Measures Documented    [] Yes LDA  for Pressure Injury Previously documented     [] Yes New Pressure Injury Discovered   [] LDA for New Pressure Injury Added      Attending RN:  Letty Tamayo RN     Second RN:  Mike LEPE RN

## 2023-06-02 NOTE — HPI
72 y.o. male with HTN, HLD, CAD, DM2, BPH, PAF, CKD stage IV, and chronic diastolic heart failure presents with a complaint of chest pain.  Acute onset this morning, located left chest, associated with shortness of breath, constant, does not radiate.  Denies fever, chills, cough, palpitations, dizziness, syncope, n/v/d, or abdominal pain.  Does additionally report back pain which is chronic.  In the ED, EKG without evidence of acute ischemia, initial troponin 0.09 which is typical of his usual baseline.  Labs show ELIZABETH and leukocytosis, .  Otherwise unremarkable for acute abnormality.  Do evidence of infectious source and no other signs of infection such as fever.  Placed in observation for gentle hydration and serial troponin.    Transesophageal echocardiogram 01/05/2023:    Description of the findings of the procedure: uneventful MARIA/DCCV with anesthesia     Findings/Key Components:   Normal biventricular size/fxn.  LVEF 65%, normal wall motion  Biatrial enlargement  No LA/VANESA thrombus  Mod AS, BASIL 1.2cm2 by planimetry  Mild-mod AI  Mild-mod TR  Mild MR     Successful DCCV AFL->SR/Mobitz I 70 BPM 75J x1     Plan:  Cont amio PO load  Cont eliquis 5mg bid  Dispo panning appropriate  Pt to follow up with Dr. Foote after discharge    Echocardiogram 02/07/2022:    The estimated ejection fraction is 55%.  The left ventricle is normal in size with normal systolic function.  Indeterminate left ventricular diastolic function.  Mild aortic regurgitation.  Mild to moderate tricuspid regurgitation.  Normal right ventricular size with normal right ventricular systolic function.  There is moderate aortic valve stenosis.  Aortic valve area is 1.30 cm2; peak velocity is 2.90 m/s; mean gradient is 22 mmHg.  Moderate left atrial enlargement.  Mild right atrial enlargement.  Mild mitral regurgitation.  The estimated PA systolic pressure is 53 mmHg.  Intermediate central venous pressure (8 mmHg).  There is pulmonary  hypertension.

## 2023-06-02 NOTE — PLAN OF CARE
06/02/23 0954   Final Note   Assessment Type Final Discharge Note   Anticipated Discharge Disposition Home   Hospital Resources/Appts/Education Provided Appointments scheduled and added to AVS   Post-Acute Status   Post-Acute Authorization Other   Other Status No Post-Acute Service Needs     Pts nurse Hannah notified that the pt can d/c from CM standpoint

## 2023-06-02 NOTE — PT/OT/SLP EVAL
Physical Therapy Evaluation    Patient Name:  Chato Bowie   MRN:  [id number]    Recommendations:     Discharge Recommendations: nursing facility, skilled   Discharge Equipment Recommendations:  (Per SNF)   Barriers to discharge:  Pt is not functioning a t Ambulatory Independent baseline and is at increased risk for falls and readmission as evident by return to hospital following fall at home earlier this weak.    Assessment:     Chato Bowie is a 72 y.o. male admitted with a medical diagnosis of Acute kidney injury superimposed on chronic kidney disease.  He presents with the following impairments/functional limitations: weakness, impaired endurance, impaired joint extensibility, decreased ROM, impaired muscle length, decreased coordination, impaired coordination, impaired self care skills, impaired functional mobility, decreased lower extremity function, gait instability, decreased safety awareness, edema, impaired balance, pain .    Rehab Prognosis: Good; patient would benefit from acute skilled PT services to address these deficits and reach maximum level of function.    Recent Surgery: * No surgery found *      Plan:     During this hospitalization, patient to be seen daily to address the identified rehab impairments via gait training, therapeutic activities, therapeutic exercises and progress toward the following goals:    Plan of Care Expires:  06/16/23    Subjective     Chief Complaint: Pain  Patient/Family Comments/goals: Pt states that when he was D/C'd earlier this week, he couldn't even get himself out of bed.  Pt agreeable to evaluation.  Cooperative and pleasant throughout despite severe pain.  Stated that he feels better after eval and treat.  Pain/Comfort:  Pain Rating 1: 10/10  Location 1: back  Pain Addressed 1: Pre-medicate for activity, Reposition, Distraction, Cessation of Activity, Nurse notified  Pain Rating 2: 10/10  Location - Orientation 2:  (L knee)  Pain Addressed 2: Pre-medicate for  "activity, Reposition, Distraction, Cessation of Activity, Nurse notified    Patients cultural, spiritual, Episcopal conflicts given the current situation: no    Living Environment:  Pt lives alone in a senior apt with an elevator.  Prior to admission, patients level of function was Modified Independent with ambulation and ADL's.  Equipment used at home: cane, straight, grab bar, glucometer, walker, rolling, rollator (Motorized scooter?).  DME owned (not currently used): none.  Upon discharge, patient will have assistance from [employer].    Objective:     Communicated with nursing prior to session.  Patient found HOB elevated with bed alarm, peripheral IV  upon PT entry to room.    General Precautions: Standard, fall  Orthopedic Precautions:N/A   Braces: N/A  Respiratory Status: Room air    Exams:  Cognitive Exam:  Patient is oriented to Person, Place, Time, and Situation  Gross Motor Coordination:  Impaired 2/2 pain, Generalized weakness, and deconditioning  Postural Exam:  Patient presented with the following abnormalities:    -       Rounded shoulders  -       Forward head  Sensation:    -       Intact  light/touch B LE's, but "tingly"  Skin Integrity/Edema:      -       Skin integrity: Visible skin intact  -       Edema: Moderate L knee  RLE ROM: WFL  RLE Strength: WFL  LLE ROM: knee approx 20*-85* 2/2 weakness, edema, and pain  LLE Strength: WFL  Varus/Valgus stress test: negative for laxity positive for pain L Knee  Anterior/Posterior Drawer test: Negative L Knee    Functional Mobility:  Bed Mobility:     Rolling Left:  minimum assistance and with VC/TC for logrolling and reaching for BR  Rolling Right: minimum assistance and with Vc/TC for logrolling and reaching for BR  Scooting: contact guard assistance and with Vc/TC for reaching for BR's and bridging to HOB in supine  Bridging: stand by assistance  Supine to Sit: maximal assistance and with Vc/TC for logrolling and body mechanics  Sit to Supine: moderate " assistance and with VC/TC for body mechanics and logrolling  Transfers:     Sit to Stand:  moderate assistance and with Vc/TC for hip hinging and hand placement  with rolling walker from elevated bed  Gait: Pt ambulated with Mod A and RW x 4 sidesteps at EOB with Vc/TC for increased trunk ext, and distribution of weight through UE's to walker  Balance: FAir+ sit, Fair/Fair- stand        AM-PAC 6 CLICK MOBILITY  Total Score:        Treatment & Education:  Educated pt on body mechanics to limit pain with bed mobility and transfers.  Pt sat at EOB and performed R AP, FAQ, and HS x 15 reps ea.  Educated pt to change positions frequently and put pillow between knees when sleeping on side.  Educated pt to sit at EOB for all meals.  Cryotherapy applied to Left knee with pt instructions to remove after 15-20m.  Pt verbalized understanding.      Patient left HOB elevated with all lines intact, call button in reach, bed alarm on, and nsg notified.    GOALS:   Multidisciplinary Problems       Physical Therapy Goals          Problem: Physical Therapy    Goal Priority Disciplines Outcome Goal Variances Interventions   Physical Therapy Goal     PT, PT/OT Ongoing, Progressing     Description: Goals to be met by: 23     Patient will increase functional independence with mobility by performin. Supine to sit with Modified [place]  2. Sit to stand transfer with Modified [place]  3. Gait  x 150 feet with Modified [place] using Rolling Walker.   4. Ascend/Descend 6 inch curb step with Modified [place] using Rolling Walker.  5. Lower extremity exercise program x10 reps per handout, with independence                         History:     Past Medical History:   Diagnosis Date    Acute congestive heart failure 3/20/2020    Alcohol abuse     Arthritis     Asthma attack 2021    Coronary artery disease     Diabetes mellitus     Hyperlipemia     Hypertension     Rhabdomyolysis        Past Surgical History:    Procedure Laterality Date    EYE SURGERY      TRANSESOPHAGEAL ECHOCARDIOGRAM WITH POSSIBLE CARDIOVERSION (MARIA W/ POSS CARDIOVERSION) N/A 1/5/2023    Procedure: Transesophageal echo (MARIA) intra-procedure log documentation;  Surgeon: Indra Foote MD;  Location: [facility] CATH LAB;  Service: Cardiology;  Laterality: N/A;    TREATMENT OF CARDIAC ARRHYTHMIA N/A 12/7/2020    Procedure: Cardioversion or Defibrillation;  Surgeon: Indra Foote MD;  Location: [facility] CATH LAB;  Service: Cardiology;  Laterality: N/A;    TREATMENT OF CARDIAC ARRHYTHMIA N/A 12/30/2020    Procedure: Cardioversion or Defibrillation;  Surgeon: Tyrone Steen MD;  Location: [facility] CATH LAB;  Service: Cardiology;  Laterality: N/A;    TREATMENT OF CARDIAC ARRHYTHMIA N/A 1/5/2023    Procedure: Cardioversion or Defibrillation;  Surgeon: nIdra Foote MD;  Location: [facility] CATH LAB;  Service: Cardiology;  Laterality: N/A;    VASCULAR SURGERY         Time Tracking:     PT Received On: 06/02/23  PT Start Time: 1111     PT Stop Time: 1149  PT Total Time (min): 38 min     Billable Minutes: Evaluation 15, Therapeutic Activity 8, and Therapeutic Exercise 15      06/02/2023

## 2023-06-02 NOTE — PLAN OF CARE
ADT 30 order placed for Stretcher Transportation.  Requested  time: 1pm  If transportation does not arrive at ETA time nurse will be instructed to follow protocol for transportation below:  How can I get in touch directly with dispatch, if needed?                 Non-emergent (stretcher): [phone]      ++NURSING:  If Stretcher does not arrive at requested time please call the above Non Emergent Dispatcher.  If issue not resolved please escalate to your charge nurse for further instructions.  Pts nurse Hannah notified that the pt can d/c from CM standpoint

## 2023-06-02 NOTE — PROGRESS NOTES
[facility] Medicine  Progress Note    Patient Name: Chato Bowie  MRN: [id number]  Patient Class: OP- Observation   Admission Date: 6/1/2023  Length of Stay: 0 days  Attending Physician: Raymond Cuellar MD  Primary Care Provider: [facility]        Subjective:     Principal Problem:Acute kidney injury superimposed on chronic kidney disease        HPI:  72 y.o. male with HTN, HLD, CAD, DM2, BPH, PAF, CKD stage IV, and chronic diastolic heart failure presents with a complaint of chest pain.  Acute onset this morning, located left chest, associated with shortness of breath, constant, does not radiate.  Denies fever, chills, cough, palpitations, dizziness, syncope, n/v/d, or abdominal pain.  Does additionally report back pain which is chronic.  In the ED, EKG without evidence of acute ischemia, initial troponin 0.09 which is typical of his usual baseline.  Labs show ELIZABETH and leukocytosis, .  Otherwise unremarkable for acute abnormality.  Do evidence of infectious source and no other signs of infection such as fever.  Placed in observation for gentle hydration and serial troponin.      Overview/Hospital Course:  Patient placed in obs for ELIZABETH. Started on gentle hydration with improvement       Interval History: No new issues. No CP       Review of Systems   Constitutional:  Negative for activity change.   HENT:  Negative for congestion.    Respiratory:  Negative for chest tightness.    Genitourinary:  Negative for difficulty urinating.   Neurological:  Negative for dizziness.   Objective:     Vital Signs (Most Recent):  Temp: 97.5 °F (36.4 °C) (06/02/23 0739)  Pulse: 65 (06/02/23 0739)  Resp: 19 (06/02/23 0739)  BP: 110/66 (06/02/23 0739)  SpO2: 100 % (06/02/23 0739) Vital Signs (24h Range):  Temp:  [97.5 °F (36.4 °C)-98.4 °F (36.9 °C)] 97.5 °F (36.4 °C)  Pulse:  [61-95] 65  Resp:  [18-20] 19  SpO2:  [94 %-100 %] 100 %  BP: (110-183)/(56-88) 110/66     Weight: 101 kg (222 lb 10.6  oz)  Body mass index is 31.06 kg/m².    Intake/Output Summary (Last 24 hours) at 6/2/2023 0919  Last data filed at 6/1/2023 2234  Gross per 24 hour   Intake 480 ml   Output 700 ml   Net -220 ml         Physical Exam  Cardiovascular:      Rate and Rhythm: Normal rate.   Pulmonary:      Effort: Pulmonary effort is normal. No respiratory distress.   Neurological:      Mental Status: He is alert and oriented to person, place, and time.           Significant Labs: All pertinent labs within the past 24 hours have been reviewed.  BMP:   Recent Labs   Lab 06/02/23  0429   *      K 3.4*   CL 95   CO2 30*   *   CREATININE 2.9*   CALCIUM 9.7     CBC:   Recent Labs   Lab 06/01/23  1215 06/02/23  0429   WBC 17.07* 11.98   HGB 14.0 12.1*   HCT 43.6 37.9*    281       Significant Imaging:       Assessment/Plan:      * Acute kidney injury superimposed on chronic kidney disease  Cr/BUN acutely worse than baseline, continue cautious gentle IV fluid hydration given heart failure, strict I/O's, monitor renal function and electrolytes, avoid nephrotoxic agents.     Improved    Type 2 diabetes mellitus with kidney complication, with long-term current use of insulin  Patient's FSGs are controlled on current medication regimen.  Last A1c reviewed-   Lab Results   Component Value Date    HGBA1C 7.5 (H) 01/05/2023     Most recent fingerstick glucose reviewed-   Recent Labs   Lab 06/01/23  1624   POCTGLUCOSE 222*     Current correctional scale  Medium  Maintain anti-hyperglycemic dose as follows-   Antihyperglycemics (From admission, onward)      Start     Stop Route Frequency Ordered    06/01/23 1507  insulin aspart U-100 pen 1-10 Units         -- SubQ Before meals & nightly PRN 06/01/23 1409          Hold Oral hypoglycemics while patient is in the hospital.    Chronic diastolic heart failure  No evidence of acute decompensation or fluid overload, appears slightly over diuresed, cautious gentle IV fluid hydration,  temporarily hold diuretics, I&Os, daily weights.    Paroxysmal atrial flutter  Continue apixaban    Chest pain  Brief atypical pain now resolved, EKG unremarkable and troponin typical of usual baseline with flat trend thus far.  Monitor on tele and obtain serial troponin.    Hyperlipidemia  Not on statin, continue cardiac diet.    Essential hypertension  Well controlled, hold Arb and diuretics due to renal function, continue other home medications and monitor blood pressure, adjust as needed.     CAD (coronary artery disease)  Continue home regimen of apixaban, hold Arb and diuretics due to renal function    Obesity Body mass index is 31.06 kg/m². weight loss as out patient     VTE Risk Mitigation (From admission, onward)           Ordered     apixaban tablet 5 mg  2 times daily         06/01/23 1409     Reason for No Pharmacological VTE Prophylaxis  Once        Question:  Reasons:  Answer:  Already adequately anticoagulated on oral Anticoagulants    06/01/23 1409     IP VTE HIGH RISK PATIENT  Once         06/01/23 1409     Place sequential compression device  Until discontinued         06/01/23 1409                    Discharge Planning   ELY:      Code Status: Full Code   Is the patient medically ready for discharge?:     Reason for patient still in hospital (select all that apply): Patient unstable  Discharge Plan A: Home Health        Likely will be discharged later today     Addendum: Patient states he can no longer take care of himself and wishes NH placement. Will consult .     Addendum: PT/OT states unsafe discharge. Will not discharge. X-ray of L knee, uric acid level. Re-consulting PT/OT (who recommend SNF)     Addendum:  After much concern- and my communication to  that PT/OT say this would not be a safe discharge-  again says H/H is patient's only option. Will consult  at home for NH placement.  Evidently case reviewed as well by   supervisor and agrees with plan to discharge.              Raymond Mccormack MD  Department of Hospital Medicine   [facility]

## 2023-06-02 NOTE — PLAN OF CARE
Problem: Occupational Therapy  Goal: Occupational Therapy Goal  Description: Goals to be met by: 6/16/23     Patient will increase functional independence with ADLs by performing:    UE Dressing with Modified [place].  LE Dressing with Modified [place] and Supervision.  Grooming while standing at sink with Modified [place], Supervision, and Assistive Devices as needed.  Toileting from toilet with Modified [place], Set-up Assistance, and Assistive Devices as needed for hygiene and clothing management.   Rolling to Bilateral with Modified [place].   Supine to sit with Modified [place] and Supervision.  Step transfer with Modified [place]  Toilet transfer to toilet with Modified [place] and Supervision.  Upper extremity exercise program x15 reps per handout, with independence.    Outcome: Ongoing, Progressing   The patient ability to perform bed mobility and transfers was limited by c/o 10/10 knee and back pain. The patient lives alone and reports having no assistance. The patient was previously able to amb using a RW on previous admission.  The patient will benefit from SNF to allow a safe D/C to home.

## 2023-06-02 NOTE — DISCHARGE SUMMARY
[facility] Medicine  Discharge Summary      Patient Name: Chato Bowie  MRN: [id number]  [facility]: [id number]  Patient Class: OP- Observation  Admission Date: 6/1/2023  Hospital Length of Stay: 0 days  Discharge Date and Time:  06/02/2023 9:24 AM  Attending Physician: Raymond Cuellar MD   Discharging Provider: Raymond Cuellar MD  Primary Care Provider: [facility]    Primary Care Team: Networked reference to record PCT     HPI:   72 y.o. male with HTN, HLD, CAD, DM2, BPH, PAF, CKD stage IV, and chronic diastolic heart failure presents with a complaint of chest pain.  Acute onset this morning, located left chest, associated with shortness of breath, constant, does not radiate.  Denies fever, chills, cough, palpitations, dizziness, syncope, n/v/d, or abdominal pain.  Does additionally report back pain which is chronic.  In the ED, EKG without evidence of acute ischemia, initial troponin 0.09 which is typical of his usual baseline.  Labs show ELIZABETH and leukocytosis, .  Otherwise unremarkable for acute abnormality.  Do evidence of infectious source and no other signs of infection such as fever.  Placed in observation for gentle hydration and serial troponin.      * No surgery found *      Hospital Course:   Patient placed in obs for ELIZABETH. Started on gentle hydration with improvement back to baseline.  Patient was discharged to home on 6/2/23. Activity as tolerated. Diet-ADA 2000 juan j low NA diet. Follow up with PCP in one week.    Long discussion with . Patient is not eligible for NH placement from obs or SNF. Will arrange H/H with  to assist with out patient arrangement of NH. Patient has electric scooter and multiple other DME.       Goals of Care Treatment Preferences:  Code Status: Full Code      Consults:   Consults (From admission, onward)          Status Ordering Provider     Inpatient consult to Cardiology  Once        Provider:  Joshua Llamas MD     Acknowledged ERNESTO MCCORMACK            No new Assessment & Plan notes have been filed under this hospital service since the last note was generated.  Service: Hospital Medicine    Final Active Diagnoses:    Diagnosis Date Noted POA    PRINCIPAL PROBLEM:  Acute kidney injury superimposed on chronic kidney disease [N17.9, N18.9] 03/10/2017 Yes    Type 2 diabetes mellitus with kidney complication, with long-term current use of insulin [E11.29, Z79.4] 03/10/2017 Not Applicable     Chronic    Chronic diastolic heart failure [I50.32] 05/12/2022 Yes     Chronic    Paroxysmal atrial flutter [I48.92] 12/07/2020 Yes     Chronic    Chest pain [R07.9] 12/05/2020 Yes    Essential hypertension [I10] 03/10/2017 Yes     Chronic    Hyperlipidemia [E78.5] 03/10/2017 Yes     Chronic    CAD (coronary artery disease) [I25.10] 03/08/2016 Yes     Chronic      Problems Resolved During this Admission:       Discharged Condition: good    Disposition: [facility]-Health Care [facility]    Follow Up:   Follow-up Information       [facility] Follow up in 1 week(s).    Contact information:  [address] [place] [address]  [place] [id number] [phone]                           Patient Instructions:   No discharge procedures on file.    Significant Diagnostic Studies:     Pending Diagnostic Studies:       None           Medications:  Reconciled Home Medications:      Medication List        CONTINUE taking these medications      albuterol 90 mcg/actuation inhaler  Commonly known as: PROVENTIL/VENTOLIN HFA  Inhale 2 puffs into the lungs every 4 (four) hours as needed.     amiodarone 200 MG Tab  Commonly known as: PACERONE  Take 2 tablets (400 mg total) by mouth 2 (two) times daily for 7 days, THEN 1 tablet (200 mg total) 2 (two) times daily for 7 days, THEN 1 tablet (200 mg total) once daily.  Start taking on: January 6, 2023     amLODIPine 10 MG tablet  Commonly known as: NORVASC  Take 10 mg by mouth Daily.     diclofenac sodium 1 % Gel  Commonly known as:  VOLTAREN  Apply 2 g topically 2 (two) times daily as needed (pain).     ELIQUIS 5 mg Tab  Generic drug: apixaban  Take 5 mg by mouth 2 (two) times daily.     * furosemide 80 MG tablet  Commonly known as: LASIX  Take 1 tablet (80 mg total) by mouth 2 (two) times daily.     * furosemide 40 MG tablet  Commonly known as: LASIX  Take 80 mg by mouth 2 (two) times daily.     hydrALAZINE 50 MG tablet  Commonly known as: APRESOLINE  Take 1.5 tablets (75 mg total) by mouth 3 (three) times daily.     insulin aspart U-100 100 unit/mL (3 mL) Inpn pen  Commonly known as: NovoLOG  Inject 5 Units into the skin 3 (three) times daily.     losartan-hydrochlorothiazide 50-12.5 mg 50-12.5 mg per tablet  Commonly known as: HYZAAR  Take 1 tablet by mouth once daily.     magnesium oxide 400 mg (241.3 mg magnesium) tablet  Commonly known as: MAG-OX  Take 800 mg by mouth.     metOLazone 5 MG tablet  Commonly known as: ZAROXOLYN  TAKE 1 TABLET BY MOUTH 3 TIMES A WEEK AS NEEDED FOR SEVERE LEG SWELLING     * metoprolol succinate 50 MG 24 hr tablet  Commonly known as: TOPROL-XL  Take 1 tablet (50 mg total) by mouth once daily.     * metoprolol succinate 25 MG 24 hr tablet  Commonly known as: TOPROL-XL  Take 25 mg by mouth.     multivitamin Tab  Take 1 tablet by mouth once daily.     NovoLIN 70/30 U-100 Insulin 100 unit/mL (70-30) injection  Generic drug: insulin NPH-insulin regular (70/30)  Inject 55 Units into the skin 2 (two) times daily.     potassium chloride 10 MEQ Tbsr  Commonly known as: KLOR-CON  Take 10 mEq by mouth once daily.     rosuvastatin 40 MG Tab  Commonly known as: CRESTOR  Take 40 mg by mouth every evening.     sildenafiL 100 MG tablet  Commonly known as: VIAGRA  TAKE 1 TABLET BY MOUTH ONCE DAILY AS NEEDED FOR ERECTILE DYSFUNCTION.     tamsulosin 0.4 mg Cap  Commonly known as: FLOMAX  Take 1 capsule (0.4 mg total) by mouth once daily.           * This list has 4 medication(s) that are the same as other medications prescribed  for you. Read the directions carefully, and ask your doctor or other care provider to review them with you.                  Indwelling Lines/Drains at time of discharge:   Lines/Drains/Airways       None                   Time spent on the discharge of patient: > 35 minutes         Raymond Mccormack MD  Department of Hospital Medicine  [facility]

## 2023-06-02 NOTE — NURSING
[facility]  Nurses Note -- 4 Eyes      6/2/2023       Skin assessed on: Q Shift      [x] No Pressure Injuries Present    [x]Prevention Measures Documented    [] Yes LDA  for Pressure Injury Previously documented     [] Yes New Pressure Injury Discovered   [] LDA for New Pressure Injury Added      Attending RN:  Hannah Lopez RN     Second RN:  JOYCE Saenz

## 2023-06-02 NOTE — PLAN OF CARE
Problem: Physical Therapy  Goal: Physical Therapy Goal  Description: Goals to be met by: 23     Patient will increase functional independence with mobility by performin. Supine to sit with Modified [place]  2. Sit to stand transfer with Modified [place]  3. Gait  x 150 feet with Modified [place] using Rolling Walker.   4. Ascend/Descend 6 inch curb step with Modified [place] using Rolling Walker.  5. Lower extremity exercise program x10 reps per handout, with independence    Outcome: Ongoing, Progressing   Initial PT evaluation performed today.  Pt could benefit from daily skilled PT services in order to maximize function prior to D/C.  SNF recommended as pt is not functioning at ambulatory Independent baseline.  Pt is  at increased risk for falls and readmission as evident by return to hospital after fall earlier this week.

## 2023-06-03 PROBLEM — R53.81 DEBILITY: Status: ACTIVE | Noted: 2023-06-03

## 2023-06-03 PROBLEM — R07.9 CHEST PAIN: Status: RESOLVED | Noted: 2020-12-05 | Resolved: 2023-06-03

## 2023-06-07 PROCEDURE — G0180 PR HOME HEALTH MD CERTIFICATION: ICD-10-PCS | Mod: ,,, | Performed by: INTERNAL MEDICINE

## 2023-06-07 PROCEDURE — G0180 MD CERTIFICATION HHA PATIENT: HCPCS | Mod: ,,, | Performed by: INTERNAL MEDICINE

## 2023-06-14 ENCOUNTER — HOSPITAL ENCOUNTER (OUTPATIENT)
Facility: HOSPITAL | Age: 73
LOS: 1 days | Discharge: HOME HEALTH | End: 2023-06-16
Attending: EMERGENCY MEDICINE | Admitting: INTERNAL MEDICINE
Payer: MEDICARE

## 2023-06-14 DIAGNOSIS — R00.1 BRADYCARDIA: ICD-10-CM

## 2023-06-14 DIAGNOSIS — E87.6 HYPOKALEMIA: Primary | ICD-10-CM

## 2023-06-14 DIAGNOSIS — R53.83 FATIGUE: ICD-10-CM

## 2023-06-14 DIAGNOSIS — I49.9 CARDIAC ARRHYTHMIA, UNSPECIFIED CARDIAC ARRHYTHMIA TYPE: ICD-10-CM

## 2023-06-14 DIAGNOSIS — R53.81 DEBILITY: ICD-10-CM

## 2023-06-14 DIAGNOSIS — R07.9 CHEST PAIN: ICD-10-CM

## 2023-06-14 PROBLEM — E78.5 DYSLIPIDEMIA: Status: ACTIVE | Noted: 2017-03-10

## 2023-06-14 PROBLEM — I95.9 HYPOTENSION: Status: ACTIVE | Noted: 2023-06-14

## 2023-06-14 PROBLEM — E04.2 MULTIPLE THYROID NODULES: Status: ACTIVE | Noted: 2023-06-14

## 2023-06-14 PROBLEM — M19.90 DJD (DEGENERATIVE JOINT DISEASE): Status: ACTIVE | Noted: 2023-06-14

## 2023-06-14 PROBLEM — I10 ESSENTIAL HYPERTENSION: Status: ACTIVE | Noted: 2017-03-10

## 2023-06-14 PROBLEM — I50.32 CHRONIC DIASTOLIC HEART FAILURE: Status: ACTIVE | Noted: 2022-05-12

## 2023-06-14 PROBLEM — D64.9 ANEMIA: Status: ACTIVE | Noted: 2017-03-10

## 2023-06-14 LAB
ALBUMIN SERPL BCP-MCNC: 2.5 G/DL (ref 3.5–5.2)
ALP SERPL-CCNC: 70 U/L (ref 55–135)
ALT SERPL W/O P-5'-P-CCNC: 17 U/L (ref 10–44)
AMPHET+METHAMPHET UR QL: NEGATIVE
ANION GAP SERPL CALC-SCNC: 12 MMOL/L (ref 8–16)
AST SERPL-CCNC: 12 U/L (ref 10–40)
BARBITURATES UR QL SCN>200 NG/ML: NEGATIVE
BASOPHILS # BLD AUTO: 0.04 K/UL (ref 0–0.2)
BASOPHILS NFR BLD: 0.5 % (ref 0–1.9)
BENZODIAZ UR QL SCN>200 NG/ML: NEGATIVE
BILIRUB SERPL-MCNC: 0.4 MG/DL (ref 0.1–1)
BNP SERPL-MCNC: 414 PG/ML (ref 0–99)
BUN SERPL-MCNC: 108 MG/DL (ref 8–23)
BZE UR QL SCN: NEGATIVE
CALCIUM SERPL-MCNC: 8 MG/DL (ref 8.7–10.5)
CANNABINOIDS UR QL SCN: NEGATIVE
CHLORIDE SERPL-SCNC: 99 MMOL/L (ref 95–110)
CO2 SERPL-SCNC: 23 MMOL/L (ref 23–29)
CREAT SERPL-MCNC: 3.6 MG/DL (ref 0.5–1.4)
CREAT UR-MCNC: 75.9 MG/DL (ref 23–375)
DIFFERENTIAL METHOD: ABNORMAL
EOSINOPHIL # BLD AUTO: 0.1 K/UL (ref 0–0.5)
EOSINOPHIL NFR BLD: 0.8 % (ref 0–8)
ERYTHROCYTE [DISTWIDTH] IN BLOOD BY AUTOMATED COUNT: 12.4 % (ref 11.5–14.5)
EST. GFR  (NO RACE VARIABLE): 17 ML/MIN/1.73 M^2
ETHANOL SERPL-MCNC: <10 MG/DL
GLUCOSE SERPL-MCNC: 143 MG/DL (ref 70–110)
HCT VFR BLD AUTO: 32.3 % (ref 40–54)
HGB BLD-MCNC: 11.1 G/DL (ref 14–18)
IMM GRANULOCYTES # BLD AUTO: 0.06 K/UL (ref 0–0.04)
IMM GRANULOCYTES NFR BLD AUTO: 0.8 % (ref 0–0.5)
LYMPHOCYTES # BLD AUTO: 0.5 K/UL (ref 1–4.8)
LYMPHOCYTES NFR BLD: 6 % (ref 18–48)
MAGNESIUM SERPL-MCNC: 1.6 MG/DL (ref 1.6–2.6)
MCH RBC QN AUTO: 27.4 PG (ref 27–31)
MCHC RBC AUTO-ENTMCNC: 34.4 G/DL (ref 32–36)
MCV RBC AUTO: 80 FL (ref 82–98)
METHADONE UR QL SCN>300 NG/ML: NEGATIVE
MONOCYTES # BLD AUTO: 1 K/UL (ref 0.3–1)
MONOCYTES NFR BLD: 12.4 % (ref 4–15)
NEUTROPHILS # BLD AUTO: 6.2 K/UL (ref 1.8–7.7)
NEUTROPHILS NFR BLD: 79.5 % (ref 38–73)
NRBC BLD-RTO: 0 /100 WBC
OPIATES UR QL SCN: NEGATIVE
PCP UR QL SCN>25 NG/ML: NEGATIVE
PLATELET # BLD AUTO: 288 K/UL (ref 150–450)
PMV BLD AUTO: 9.5 FL (ref 9.2–12.9)
POCT GLUCOSE: 166 MG/DL (ref 70–110)
POTASSIUM SERPL-SCNC: 2.5 MMOL/L (ref 3.5–5.1)
PROT SERPL-MCNC: 5.8 G/DL (ref 6–8.4)
RBC # BLD AUTO: 4.05 M/UL (ref 4.6–6.2)
SODIUM SERPL-SCNC: 134 MMOL/L (ref 136–145)
TOXICOLOGY INFORMATION: NORMAL
TROPONIN I SERPL DL<=0.01 NG/ML-MCNC: 0.07 NG/ML (ref 0–0.03)
TSH SERPL DL<=0.005 MIU/L-ACNC: 3.57 UIU/ML (ref 0.4–4)
URATE SERPL-MCNC: 10.5 MG/DL (ref 3.4–7)
WBC # BLD AUTO: 7.8 K/UL (ref 3.9–12.7)

## 2023-06-14 PROCEDURE — 84550 ASSAY OF BLOOD/URIC ACID: CPT | Performed by: EMERGENCY MEDICINE

## 2023-06-14 PROCEDURE — 96365 THER/PROPH/DIAG IV INF INIT: CPT

## 2023-06-14 PROCEDURE — 85025 COMPLETE CBC W/AUTO DIFF WBC: CPT | Performed by: EMERGENCY MEDICINE

## 2023-06-14 PROCEDURE — 80053 COMPREHEN METABOLIC PANEL: CPT | Performed by: EMERGENCY MEDICINE

## 2023-06-14 PROCEDURE — 25000003 PHARM REV CODE 250: Performed by: EMERGENCY MEDICINE

## 2023-06-14 PROCEDURE — 84443 ASSAY THYROID STIM HORMONE: CPT | Performed by: EMERGENCY MEDICINE

## 2023-06-14 PROCEDURE — 83880 ASSAY OF NATRIURETIC PEPTIDE: CPT | Performed by: EMERGENCY MEDICINE

## 2023-06-14 PROCEDURE — 63600175 PHARM REV CODE 636 W HCPCS: Performed by: INTERNAL MEDICINE

## 2023-06-14 PROCEDURE — 93010 ELECTROCARDIOGRAM REPORT: CPT | Mod: ,,, | Performed by: INTERNAL MEDICINE

## 2023-06-14 PROCEDURE — 83735 ASSAY OF MAGNESIUM: CPT | Performed by: EMERGENCY MEDICINE

## 2023-06-14 PROCEDURE — 82077 ASSAY SPEC XCP UR&BREATH IA: CPT | Performed by: EMERGENCY MEDICINE

## 2023-06-14 PROCEDURE — 80307 DRUG TEST PRSMV CHEM ANLYZR: CPT | Performed by: EMERGENCY MEDICINE

## 2023-06-14 PROCEDURE — 21400001 HC TELEMETRY ROOM

## 2023-06-14 PROCEDURE — 96366 THER/PROPH/DIAG IV INF ADDON: CPT

## 2023-06-14 PROCEDURE — 84484 ASSAY OF TROPONIN QUANT: CPT | Performed by: EMERGENCY MEDICINE

## 2023-06-14 PROCEDURE — 96375 TX/PRO/DX INJ NEW DRUG ADDON: CPT

## 2023-06-14 PROCEDURE — 93010 EKG 12-LEAD: ICD-10-PCS | Mod: ,,, | Performed by: INTERNAL MEDICINE

## 2023-06-14 PROCEDURE — 93005 ELECTROCARDIOGRAM TRACING: CPT

## 2023-06-14 PROCEDURE — 25000003 PHARM REV CODE 250: Performed by: INTERNAL MEDICINE

## 2023-06-14 PROCEDURE — 63600175 PHARM REV CODE 636 W HCPCS: Performed by: EMERGENCY MEDICINE

## 2023-06-14 PROCEDURE — 82962 GLUCOSE BLOOD TEST: CPT

## 2023-06-14 PROCEDURE — 99285 EMERGENCY DEPT VISIT HI MDM: CPT | Mod: 25

## 2023-06-14 RX ORDER — SODIUM CHLORIDE 0.9 % (FLUSH) 0.9 %
10 SYRINGE (ML) INJECTION
Status: DISCONTINUED | OUTPATIENT
Start: 2023-06-14 | End: 2023-06-16 | Stop reason: HOSPADM

## 2023-06-14 RX ORDER — INSULIN ASPART 100 [IU]/ML
25 INJECTION, SUSPENSION SUBCUTANEOUS 2 TIMES DAILY WITH MEALS
Status: DISCONTINUED | OUTPATIENT
Start: 2023-06-15 | End: 2023-06-15

## 2023-06-14 RX ORDER — INSULIN ASPART 100 [IU]/ML
0-5 INJECTION, SOLUTION INTRAVENOUS; SUBCUTANEOUS
Status: DISCONTINUED | OUTPATIENT
Start: 2023-06-14 | End: 2023-06-16 | Stop reason: HOSPADM

## 2023-06-14 RX ORDER — ACETAMINOPHEN 325 MG/1
650 TABLET ORAL EVERY 8 HOURS PRN
Status: DISCONTINUED | OUTPATIENT
Start: 2023-06-14 | End: 2023-06-16 | Stop reason: HOSPADM

## 2023-06-14 RX ORDER — SODIUM CHLORIDE AND POTASSIUM CHLORIDE 150; 900 MG/100ML; MG/100ML
INJECTION, SOLUTION INTRAVENOUS
Status: COMPLETED | OUTPATIENT
Start: 2023-06-14 | End: 2023-06-15

## 2023-06-14 RX ORDER — IBUPROFEN 200 MG
16 TABLET ORAL
Status: DISCONTINUED | OUTPATIENT
Start: 2023-06-14 | End: 2023-06-16 | Stop reason: HOSPADM

## 2023-06-14 RX ORDER — ACETAMINOPHEN 325 MG/1
650 TABLET ORAL
Status: COMPLETED | OUTPATIENT
Start: 2023-06-14 | End: 2023-06-14

## 2023-06-14 RX ORDER — CALCIUM GLUCONATE 98 MG/ML
1 INJECTION, SOLUTION INTRAVENOUS
Status: COMPLETED | OUTPATIENT
Start: 2023-06-14 | End: 2023-06-14

## 2023-06-14 RX ORDER — LANOLIN ALCOHOL/MO/W.PET/CERES
800 CREAM (GRAM) TOPICAL
Status: DISCONTINUED | OUTPATIENT
Start: 2023-06-14 | End: 2023-06-16 | Stop reason: HOSPADM

## 2023-06-14 RX ORDER — GLUCAGON 1 MG
1 KIT INJECTION
Status: DISCONTINUED | OUTPATIENT
Start: 2023-06-14 | End: 2023-06-16 | Stop reason: HOSPADM

## 2023-06-14 RX ORDER — ACETAMINOPHEN 325 MG/1
650 TABLET ORAL EVERY 4 HOURS PRN
Status: DISCONTINUED | OUTPATIENT
Start: 2023-06-14 | End: 2023-06-16 | Stop reason: HOSPADM

## 2023-06-14 RX ORDER — ONDANSETRON 2 MG/ML
4 INJECTION INTRAMUSCULAR; INTRAVENOUS EVERY 8 HOURS PRN
Status: DISCONTINUED | OUTPATIENT
Start: 2023-06-14 | End: 2023-06-16 | Stop reason: HOSPADM

## 2023-06-14 RX ORDER — IBUPROFEN 200 MG
24 TABLET ORAL
Status: DISCONTINUED | OUTPATIENT
Start: 2023-06-14 | End: 2023-06-16 | Stop reason: HOSPADM

## 2023-06-14 RX ORDER — ALBUTEROL SULFATE 2.5 MG/.5ML
2.5 SOLUTION RESPIRATORY (INHALATION) EVERY 4 HOURS PRN
Status: DISCONTINUED | OUTPATIENT
Start: 2023-06-14 | End: 2023-06-16 | Stop reason: HOSPADM

## 2023-06-14 RX ORDER — LOSARTAN POTASSIUM AND HYDROCHLOROTHIAZIDE 12.5; 5 MG/1; MG/1
1 TABLET ORAL DAILY
Status: ON HOLD | COMMUNITY
End: 2023-06-16 | Stop reason: HOSPADM

## 2023-06-14 RX ORDER — INSULIN ASPART 100 [IU]/ML
5 INJECTION, SOLUTION INTRAVENOUS; SUBCUTANEOUS 3 TIMES DAILY
Status: DISCONTINUED | OUTPATIENT
Start: 2023-06-15 | End: 2023-06-15

## 2023-06-14 RX ORDER — HEPARIN SODIUM 5000 [USP'U]/ML
5000 INJECTION, SOLUTION INTRAVENOUS; SUBCUTANEOUS EVERY 8 HOURS
Status: DISCONTINUED | OUTPATIENT
Start: 2023-06-14 | End: 2023-06-14

## 2023-06-14 RX ORDER — NALOXONE HCL 0.4 MG/ML
0.02 VIAL (ML) INJECTION
Status: DISCONTINUED | OUTPATIENT
Start: 2023-06-14 | End: 2023-06-16 | Stop reason: HOSPADM

## 2023-06-14 RX ORDER — ATORVASTATIN CALCIUM 40 MG/1
40 TABLET, FILM COATED ORAL DAILY
Status: DISCONTINUED | OUTPATIENT
Start: 2023-06-15 | End: 2023-06-16 | Stop reason: HOSPADM

## 2023-06-14 RX ORDER — ALBUTEROL SULFATE 90 UG/1
2 AEROSOL, METERED RESPIRATORY (INHALATION) EVERY 4 HOURS PRN
Status: DISCONTINUED | OUTPATIENT
Start: 2023-06-14 | End: 2023-06-14 | Stop reason: CLARIF

## 2023-06-14 RX ADMIN — SODIUM CHLORIDE AND POTASSIUM CHLORIDE 100 ML/HR: 9; 1.49 INJECTION, SOLUTION INTRAVENOUS at 05:06

## 2023-06-14 RX ADMIN — POTASSIUM BICARBONATE 40 MEQ: 391 TABLET, EFFERVESCENT ORAL at 05:06

## 2023-06-14 RX ADMIN — CALCIUM GLUCONATE 1 G: 98 INJECTION, SOLUTION INTRAVENOUS at 05:06

## 2023-06-14 RX ADMIN — HEPARIN SODIUM 5000 UNITS: 5000 INJECTION INTRAVENOUS; SUBCUTANEOUS at 10:06

## 2023-06-14 RX ADMIN — ACETAMINOPHEN 650 MG: 325 TABLET ORAL at 07:06

## 2023-06-14 NOTE — ASSESSMENT & PLAN NOTE
Chronic HFpEF without exacerbation  Lasix on hold due to hypotension  Metoprolol on hold due to bradycardia.  Cardiology to follow.  Status post MARIA, 1/5/2023 with LVEF 65% normal wall motion, moderate AAS 1.2 cm².:

## 2023-06-14 NOTE — ED TRIAGE NOTES
EMS called to pt residence for a male that has had malaise and fatigue combined with lower back and bilateral knee pain x2 days. Pt denies any CP no SOB no nausea or vomiting and no trauma. EMS reports abnormal heart rhythm

## 2023-06-14 NOTE — ASSESSMENT & PLAN NOTE
Suspected AV dissociation.  ER physician has discussed case with cardiology who is aware about the case.  Hold AV iker blocking agents.  Check TFTs  Echocardiogram  Cardiology consult  Amio on hold for now.

## 2023-06-14 NOTE — Clinical Note
Diagnosis: Bradycardia [399826]   Admitting Provider:: SHAYNA ALMONTE [[id number]]   Future Attending Provider: NANCY DELEON [[id number]]   Reason for IP Medical Treatment  (Clinical interventions that can only be accomplished in the IP setting? ) :: Electrolyte repletion, cardiopulmonary monitoring, cardiology evaluation   I certify that Inpatient services for greater than or equal to 2 midnights are medically necessary:: Yes   Plans for Post-Acute care--if anticipated (pick the single best option):: C. Discharge home with home health services   Special Needs:: Fall Risk [15]

## 2023-06-14 NOTE — ED PROVIDER NOTES
Encounter Date: 6/14/2023    SCRIBE #1 NOTE: IRadha, am scribing for, and in the presence of,  Kings Henderson MD. I have scribed the following portions of the note - Other sections scribed: HPI, ROS, PE.     History     Chief Complaint   Patient presents with    Fatigue     EMS called to pt residence for a male that has had malaise and fatigue combined with lower back and bilateral knee pain x2 days. Pt denies any CP no SOB no nausea or vomiting and no trauma. EMS reports abnormal heart rhythm      This is a 72 year old male with PMHx of Hypertension, Hyperlipidemia, Gout, Arthritis, CHF, CKD, CAD, MI, Diabetes mellitus, and Alcohol abuse presenting to the ED with a complaint of malaise and fatigue combined with lower back and bilateral knee pain for 2 days. Patient reports being seen in the ED 3 weeks ago regarding a fall onto the back that took place while trying to get into the bed. Patient states he is not ambulatory with walker per usual. The patient missed appointment with PCP at [facility] 2 weeks ago. Last consumed alcohol 3 weeks ago. No other exacerbating or alleviating factors. Denies fever, chest pain, shortness of breath. No further complaints at present time.     The history is provided by the patient. No  was used.   Review of patient's allergies indicates:  No Known Allergies  Past Medical History:   Diagnosis Date    Acute congestive heart failure 3/20/2020    Alcohol abuse     Arthritis     Asthma attack 11/24/2021    Coronary artery disease     Diabetes mellitus     Hyperlipemia     Hypertension     Rhabdomyolysis      Past Surgical History:   Procedure Laterality Date    EYE SURGERY      TRANSESOPHAGEAL ECHOCARDIOGRAM WITH POSSIBLE CARDIOVERSION (MARIA W/ POSS CARDIOVERSION) N/A 1/5/2023    Procedure: Transesophageal echo (MARIA) intra-procedure log documentation;  Surgeon: Indra Foote MD;  Location: [facility] CATH LAB;  Service: Cardiology;  Laterality: N/A;     TREATMENT OF CARDIAC ARRHYTHMIA N/A 12/7/2020    Procedure: Cardioversion or Defibrillation;  Surgeon: Indra Foote MD;  Location: [facility] CATH LAB;  Service: Cardiology;  Laterality: N/A;    TREATMENT OF CARDIAC ARRHYTHMIA N/A 12/30/2020    Procedure: Cardioversion or Defibrillation;  Surgeon: Tyrone Steen MD;  Location: [facility] CATH LAB;  Service: Cardiology;  Laterality: N/A;    TREATMENT OF CARDIAC ARRHYTHMIA N/A 1/5/2023    Procedure: Cardioversion or Defibrillation;  Surgeon: Indra Foote MD;  Location: [facility] CATH LAB;  Service: Cardiology;  Laterality: N/A;    VASCULAR SURGERY       Family History   Problem Relation Age of Onset    Hypertension Mother     Diabetes Mother     Diabetes Father     Hypertension Father     Diabetes Sister     Hypertension Sister      Social History     Tobacco Use    Smoking status: Never    Smokeless tobacco: Never   Substance Use Topics    Alcohol use: Not Currently     Alcohol/week: 6.0 standard drinks     Types: 6 Cans of beer per week     Comment: daily    Drug use: No     Review of Systems   Constitutional:  Positive for activity change and fatigue. Negative for fever.   HENT:  Negative for facial swelling and sore throat.    Eyes:  Negative for pain and discharge.   Respiratory:  Negative for choking and shortness of breath.    Cardiovascular:  Negative for chest pain.   Gastrointestinal:  Negative for abdominal pain, nausea and vomiting.   Genitourinary:  Negative for dysuria and frequency.   Musculoskeletal:  Positive for arthralgias, back pain, gait problem and joint swelling.   Skin:  Negative for rash.   Neurological:  Positive for weakness (generalized). Negative for numbness.     Physical Exam     Initial Vitals [06/14/23 1424]   BP Pulse Resp Temp SpO2   (!) 98/50 (!) 44 18 98 °F (36.7 °C) 98 %      MAP       --         Physical Exam    Nursing note and vitals reviewed.  Constitutional: He is not diaphoretic. No distress.   HENT:   Head: Normocephalic and  atraumatic.   Protecting airway   Eyes: Conjunctivae and EOM are normal. No scleral icterus.   Neck: Neck supple. No tracheal deviation present.   Normal range of motion.  Cardiovascular:  Regular rhythm and intact distal pulses.   Bradycardia present.         Pulmonary/Chest: No stridor. No respiratory distress.   Speaking in full sentences   Abdominal: Abdomen is soft. He exhibits no distension. There is no abdominal tenderness.   Musculoskeletal:         General: No tenderness or edema.      Cervical back: Normal range of motion and neck supple.     Neurological: He is alert. He has normal strength. No cranial nerve deficit or sensory deficit.   Skin: Skin is warm and dry.   Psychiatric: He has a normal mood and affect.       ED Course   Procedures  Labs Reviewed   CBC W/ AUTO DIFFERENTIAL - Abnormal; Notable for the following components:       Result Value    RBC 4.05 (*)     Hemoglobin 11.1 (*)     Hematocrit 32.3 (*)     MCV 80 (*)     Immature Granulocytes 0.8 (*)     Immature Grans (Abs) 0.06 (*)     Lymph # 0.5 (*)     Gran % 79.5 (*)     Lymph % 6.0 (*)     All other components within normal limits   COMPREHENSIVE METABOLIC PANEL - Abnormal; Notable for the following components:    Sodium 134 (*)     Potassium 2.5 (*)     Glucose 143 (*)      (*)     Creatinine 3.6 (*)     Calcium 8.0 (*)     Total Protein 5.8 (*)     Albumin 2.5 (*)     eGFR 17 (*)     All other components within normal limits    Narrative:     K critical result(s) called and verbal readback obtained from NURIA Ho RN by SUREKHA 06/14/2023 16:56   TROPONIN I - Abnormal; Notable for the following components:    Troponin I 0.067 (*)     All other components within normal limits   B-TYPE NATRIURETIC PEPTIDE - Abnormal; Notable for the following components:     (*)     All other components within normal limits   URIC ACID - Abnormal; Notable for the following components:    Uric Acid 10.5 (*)     All other components within normal  limits   POCT GLUCOSE - Abnormal; Notable for the following components:    POCT Glucose 166 (*)     All other components within normal limits   MAGNESIUM   ALCOHOL,MEDICAL (ETHANOL)   DRUG SCREEN PANEL, URINE EMERGENCY    Narrative:     Specimen Source->Urine   TSH   POCT GLUCOSE MONITORING CONTINUOUS     EKG Readings: (Independently Interpreted)   Initial Reading: No STEMI. Rhythm: Sinus Bradycardia. Heart Rate: 43. Conduction: 3rd Degree AV Block and LBBB. ST Segments: Normal ST Segments. Axis: Left Axis Deviation.   Nonspecific T-wave abnormality     Imaging Results              X-Ray Chest AP Portable (Final result)  Result time 06/14/23 16:56:58      Final result by Marie Cerna MD (06/14/23 16:56:58)                   Impression:      No finding to correlate with the history      Electronically signed by: Marie Cerna MD  Date:    06/14/2023  Time:    16:56               Narrative:    EXAMINATION:  XR CHEST AP PORTABLE    CLINICAL HISTORY:  Other fatigue    TECHNIQUE:  Single frontal view of the chest was performed.    COMPARISON:  None    FINDINGS:  Heart size is stable.  There is no pleural effusion.  Lungs demonstrate no focal consolidation.  Osseous structures show degenerative changes.  Calcification present along the wall of the aorta.                                       Medications   0.9 % NaCl with KCl 20 mEq infusion (100 mL/hr Intravenous New Bag 6/14/23 1731)   potassium bicarbonate disintegrating tablet 40 mEq (40 mEq Oral Given 6/14/23 1734)   calcium gluconate 100 mg/mL (10%) injection 1 g (1 g Intravenous Given 6/14/23 1734)     Medical Decision Making:   History:   Old Medical Records: I decided to obtain old medical records.  Differential Diagnosis:   Includes but is not limited to: Gout, Arthritis, Strain, Sprain, Electrolyte abnormality, Medication misuse.     Independently Interpreted Test(s):   I have ordered and independently interpreted EKG Reading(s) - see prior  notes  Clinical Tests:   Lab Tests: Ordered and Reviewed  Radiological Study: Ordered and Reviewed  Medical Tests: Ordered and Reviewed  ED Management:  Patient is afebrile and not toxic appearing at time history and physical.  He is hypotensive and bradycardic at triage.  At time of my assessment blood pressure has normalized however patient remains bradycardic.  He is mentating well and has a GCS of 15.  EKG appears to demonstrate AV dissociation.  Labs without leukocytosis or significant anemia.  Patient has significant hypokalemia at 2.5.  He has some ELIZABETH on top of his pre-existing CKD.  Troponin is elevated at 0.067 this is improved compared to prior.  BNP is elevated at 414 however patient is saturating adequately on room air.  Chest x-ray does not show pulmonary edema or pleural effusion. Consult: I have spoken with Dr. Steen from the cardiology service regarding the patient's presentation and study results.  At this time, the recommendation is EKG does not appear to have third-degree AV block.  EKG is more consistent with Wenckebach.  Patient should have cardiopulmonary monitoring and electrolyte repletion, which should improve dysrhythmia.  Patient started on electrolyte repletion in the emergency department.  He is to be admitted to Hospital Medicine for further evaluation and management.    Please put in 60 minutes of critical care due to patient having a high risk of metabolic failure.   Separate from teaching and exclusive of procedure and ekg time  Includes:  Time at bedside  Time reviewing test results  Time discussing case with staff  Time documenting the medical record  Time spent with family members  Time spent with consults  Management   This chart was completed using dictation software, as a result there may be some transcription errors.         I, Kings Henderson , personally performed the services described in this documentation.  All medical record entries made by the cieloibboston were at my  direction and in my presence.  I have reviewed the chart and agree that the record reflects my personal performance and is accurate and complete.      Scribe Attestation:   Scribe #1: I performed the above scribed service and the documentation accurately describes the services I performed. I attest to the accuracy of the note.                   Clinical Impression:   Final diagnoses:  [R53.83] Fatigue  [R00.1] Bradycardia  [E87.6] Hypokalemia (Primary)  [I49.9] Cardiac arrhythmia, unspecified cardiac arrhythmia type        ED Disposition Condition    Admit Stable                Kings Henderson MD  06/14/23 2683

## 2023-06-14 NOTE — ASSESSMENT & PLAN NOTE
Amlodipine on hold due to hypotension  Losartan/HCTZ and metolazone were discontinued previous admission

## 2023-06-14 NOTE — HPI
Mr Bowie is a 72-year-old -American male who presents the emergency room complaining of generally not feeling well, ongoing left knee pain, and some lightheadedness and dizziness that has gradually worsened over the last 24 to 48 hours.  His medical history is significant for hypertension, atrial flutter/fib on anticoagulation with Eliquis, chronic HFpEF, type 2 diabetes mellitus, BPH, CKD 4, coronary disease, dyslipidemia, anemia of chronic disease, DJD and known thyroid nodules.  He was recently discharged from the hospital on 6/6/2023 during which time he was having notable left knee pain, had known previous left knee large effusion that was evaluated by orthopedic surgery, underwent steroid injection on 6/5/2023 with some relief and was able to be transitioned back to home with home health care.  He reports that he has not seen orthopedics after discharge as of yet.  He did suffer a previous fall about 3 weeks ago and at baseline, uses walker to get around to a certain degree.    On arrival in the ER, he was noted to have hypotension with blood pressure of 98/50, heart rate of 44 and saturating 98% on room air.  He is CBC appears grossly unremarkable except mild anemia which appears to be chronic.  His BMP reviewed that shows hypokalemia with potassium of 2.5, creatinine of 3.6 with his baseline of about 2.6, elevated uric acid of 10.5.  His corrected calcium is 9.2.  His troponin is marginally elevated at 0.67 without active chest pains.  His chest x-ray does not demonstrate any acute findings. ER physician promptly discussed case with cardiologist on-call with recommendation of admission, close monitoring, his EKG may represent Wenke block, and replacing electrolytes and getting further cardiac work-up.  Is being admitted for further evaluation and treatment.

## 2023-06-14 NOTE — ASSESSMENT & PLAN NOTE
Continue aspart 5 units 3 times daily  Sliding scale insulin with Accu-Chek monitoring  Decreased 70/30 to 25 units twice daily from 55 twice daily.

## 2023-06-15 PROBLEM — E66.3 OVERWEIGHT (BMI 25.0-29.9): Status: ACTIVE | Noted: 2023-06-15

## 2023-06-15 LAB
ANION GAP SERPL CALC-SCNC: 14 MMOL/L (ref 8–16)
AORTIC ROOT ANNULUS: 3.79 CM
AORTIC VALVE CUSP SEPERATION: 1.88 CM
ASCENDING AORTA: 2.96 CM
AV INDEX (PROSTH): 0.27
AV MEAN GRADIENT: 31 MMHG
AV PEAK GRADIENT: 62 MMHG
AV REGURGITATION PRESSURE HALF TIME: 526.58 MS
AV VALVE AREA: 1.43 CM2
AV VELOCITY RATIO: 0.23
BASOPHILS # BLD AUTO: 0.06 K/UL (ref 0–0.2)
BASOPHILS NFR BLD: 1.1 % (ref 0–1.9)
BSA FOR ECHO PROCEDURE: 2.17 M2
BUN SERPL-MCNC: 117 MG/DL (ref 8–23)
CALCIUM SERPL-MCNC: 9.9 MG/DL (ref 8.7–10.5)
CHLORIDE SERPL-SCNC: 95 MMOL/L (ref 95–110)
CO2 SERPL-SCNC: 25 MMOL/L (ref 23–29)
CREAT SERPL-MCNC: 3.3 MG/DL (ref 0.5–1.4)
CV ECHO LV RWT: 0.44 CM
DIFFERENTIAL METHOD: ABNORMAL
DOP CALC AO PEAK VEL: 3.94 M/S
DOP CALC AO VTI: 86.1 CM
DOP CALC LVOT AREA: 5.3 CM2
DOP CALC LVOT DIAMETER: 2.61 CM
DOP CALC LVOT PEAK VEL: 0.92 M/S
DOP CALC LVOT STROKE VOLUME: 122.99 CM3
DOP CALCLVOT PEAK VEL VTI: 23 CM
E WAVE DECELERATION TIME: 330.7 MSEC
E/A RATIO: 1.43
ECHO LV POSTERIOR WALL: 1.24 CM (ref 0.6–1.1)
EJECTION FRACTION: 60 %
EOSINOPHIL # BLD AUTO: 0.1 K/UL (ref 0–0.5)
EOSINOPHIL NFR BLD: 1.9 % (ref 0–8)
ERYTHROCYTE [DISTWIDTH] IN BLOOD BY AUTOMATED COUNT: 12.7 % (ref 11.5–14.5)
EST. GFR  (NO RACE VARIABLE): 19 ML/MIN/1.73 M^2
FRACTIONAL SHORTENING: 30 % (ref 28–44)
GLUCOSE SERPL-MCNC: 120 MG/DL (ref 70–110)
HCT VFR BLD AUTO: 42.9 % (ref 40–54)
HGB BLD-MCNC: 13.8 G/DL (ref 14–18)
IMM GRANULOCYTES # BLD AUTO: 0.03 K/UL (ref 0–0.04)
IMM GRANULOCYTES NFR BLD AUTO: 0.6 % (ref 0–0.5)
INTERVENTRICULAR SEPTUM: 1.11 CM (ref 0.6–1.1)
LA MAJOR: 6.4 CM
LA MINOR: 6.4 CM
LA WIDTH: 5.2 CM
LEFT ATRIUM SIZE: 5.14 CM
LEFT ATRIUM VOLUME INDEX: 67.9 ML/M2
LEFT ATRIUM VOLUME: 145.4 CM3
LEFT INTERNAL DIMENSION IN SYSTOLE: 3.96 CM (ref 2.1–4)
LEFT VENTRICLE DIASTOLIC VOLUME INDEX: 72.6 ML/M2
LEFT VENTRICLE DIASTOLIC VOLUME: 155.37 ML
LEFT VENTRICLE MASS INDEX: 128 G/M2
LEFT VENTRICLE SYSTOLIC VOLUME INDEX: 32 ML/M2
LEFT VENTRICLE SYSTOLIC VOLUME: 68.44 ML
LEFT VENTRICULAR INTERNAL DIMENSION IN DIASTOLE: 5.63 CM (ref 3.5–6)
LEFT VENTRICULAR MASS: 274.92 G
LV LATERAL E/E' RATIO: 16.57 M/S
LVOT MG: 1.8 MMHG
LVOT MV: 0.63 CM/S
LYMPHOCYTES # BLD AUTO: 0.7 K/UL (ref 1–4.8)
LYMPHOCYTES NFR BLD: 13.1 % (ref 18–48)
MAGNESIUM SERPL-MCNC: 1.8 MG/DL (ref 1.6–2.6)
MCH RBC QN AUTO: 26.7 PG (ref 27–31)
MCHC RBC AUTO-ENTMCNC: 32.2 G/DL (ref 32–36)
MCV RBC AUTO: 83 FL (ref 82–98)
MONOCYTES # BLD AUTO: 0.6 K/UL (ref 0.3–1)
MONOCYTES NFR BLD: 11.9 % (ref 4–15)
MV PEAK A VEL: 0.81 M/S
MV PEAK E VEL: 1.16 M/S
MV STENOSIS PRESSURE HALF TIME: 95.9 MS
MV VALVE AREA P 1/2 METHOD: 2.29 CM2
NEUTROPHILS # BLD AUTO: 3.8 K/UL (ref 1.8–7.7)
NEUTROPHILS NFR BLD: 71.4 % (ref 38–73)
NRBC BLD-RTO: 0 /100 WBC
PHOSPHATE SERPL-MCNC: 3.6 MG/DL (ref 2.7–4.5)
PISA AR MAX VEL: 4.44 M/S
PISA TR MAX VEL: 3 M/S
PLATELET # BLD AUTO: 289 K/UL (ref 150–450)
PMV BLD AUTO: 9.6 FL (ref 9.2–12.9)
POCT GLUCOSE: 109 MG/DL (ref 70–110)
POCT GLUCOSE: 183 MG/DL (ref 70–110)
POCT GLUCOSE: 309 MG/DL (ref 70–110)
POCT GLUCOSE: 329 MG/DL (ref 70–110)
POTASSIUM SERPL-SCNC: 3.3 MMOL/L (ref 3.5–5.1)
PV PEAK VELOCITY: 1.33 CM/S
RA MAJOR: 5 CM
RA PRESSURE: 3 MMHG
RA WIDTH: 3.8 CM
RBC # BLD AUTO: 5.16 M/UL (ref 4.6–6.2)
SINUS: 3.22 CM
SODIUM SERPL-SCNC: 134 MMOL/L (ref 136–145)
STJ: 2.93 CM
TDI LATERAL: 0.07 M/S
TR MAX PG: 36 MMHG
TRICUSPID ANNULAR PLANE SYSTOLIC EXCURSION: 2.43 CM
TV PEAK GRADIENT: 3.15 MMHG
TV REST PULMONARY ARTERY PRESSURE: 39 MMHG
WBC # BLD AUTO: 5.28 K/UL (ref 3.9–12.7)

## 2023-06-15 PROCEDURE — G0378 HOSPITAL OBSERVATION PER HR: HCPCS

## 2023-06-15 PROCEDURE — 85025 COMPLETE CBC W/AUTO DIFF WBC: CPT | Performed by: INTERNAL MEDICINE

## 2023-06-15 PROCEDURE — 97161 PT EVAL LOW COMPLEX 20 MIN: CPT

## 2023-06-15 PROCEDURE — 83735 ASSAY OF MAGNESIUM: CPT | Performed by: INTERNAL MEDICINE

## 2023-06-15 PROCEDURE — 63600175 PHARM REV CODE 636 W HCPCS: Performed by: INTERNAL MEDICINE

## 2023-06-15 PROCEDURE — 25000003 PHARM REV CODE 250: Performed by: INTERNAL MEDICINE

## 2023-06-15 PROCEDURE — 99223 PR INITIAL HOSPITAL CARE,LEVL III: ICD-10-PCS | Mod: 25,,, | Performed by: INTERNAL MEDICINE

## 2023-06-15 PROCEDURE — 99223 1ST HOSP IP/OBS HIGH 75: CPT | Mod: 25,,, | Performed by: INTERNAL MEDICINE

## 2023-06-15 PROCEDURE — 25000003 PHARM REV CODE 250: Performed by: STUDENT IN AN ORGANIZED HEALTH CARE EDUCATION/TRAINING PROGRAM

## 2023-06-15 PROCEDURE — 93010 ELECTROCARDIOGRAM REPORT: CPT | Mod: ,,, | Performed by: INTERNAL MEDICINE

## 2023-06-15 PROCEDURE — 93010 EKG 12-LEAD: ICD-10-PCS | Mod: ,,, | Performed by: INTERNAL MEDICINE

## 2023-06-15 PROCEDURE — 36415 COLL VENOUS BLD VENIPUNCTURE: CPT | Performed by: INTERNAL MEDICINE

## 2023-06-15 PROCEDURE — 80048 BASIC METABOLIC PNL TOTAL CA: CPT | Performed by: INTERNAL MEDICINE

## 2023-06-15 PROCEDURE — 63600175 PHARM REV CODE 636 W HCPCS: Performed by: STUDENT IN AN ORGANIZED HEALTH CARE EDUCATION/TRAINING PROGRAM

## 2023-06-15 PROCEDURE — 93005 ELECTROCARDIOGRAM TRACING: CPT

## 2023-06-15 PROCEDURE — 84100 ASSAY OF PHOSPHORUS: CPT | Performed by: INTERNAL MEDICINE

## 2023-06-15 PROCEDURE — 25000003 PHARM REV CODE 250

## 2023-06-15 RX ORDER — POTASSIUM CHLORIDE 20 MEQ/1
40 TABLET, EXTENDED RELEASE ORAL ONCE
Status: COMPLETED | OUTPATIENT
Start: 2023-06-15 | End: 2023-06-15

## 2023-06-15 RX ORDER — DEXTROSE 40 %
15 GEL (GRAM) ORAL
Status: DISCONTINUED | OUTPATIENT
Start: 2023-06-15 | End: 2023-06-16 | Stop reason: HOSPADM

## 2023-06-15 RX ORDER — TALC
6 POWDER (GRAM) TOPICAL NIGHTLY PRN
Status: DISCONTINUED | OUTPATIENT
Start: 2023-06-15 | End: 2023-06-16 | Stop reason: HOSPADM

## 2023-06-15 RX ORDER — DEXTROSE 40 %
30 GEL (GRAM) ORAL
Status: DISCONTINUED | OUTPATIENT
Start: 2023-06-15 | End: 2023-06-16 | Stop reason: HOSPADM

## 2023-06-15 RX ORDER — INSULIN ASPART 100 [IU]/ML
0-5 INJECTION, SOLUTION INTRAVENOUS; SUBCUTANEOUS
Status: DISCONTINUED | OUTPATIENT
Start: 2023-06-15 | End: 2023-06-16 | Stop reason: HOSPADM

## 2023-06-15 RX ORDER — GLUCAGON 1 MG
1 KIT INJECTION
Status: DISCONTINUED | OUTPATIENT
Start: 2023-06-15 | End: 2023-06-16 | Stop reason: HOSPADM

## 2023-06-15 RX ORDER — LANOLIN ALCOHOL/MO/W.PET/CERES
800 CREAM (GRAM) TOPICAL ONCE
Status: COMPLETED | OUTPATIENT
Start: 2023-06-15 | End: 2023-06-15

## 2023-06-15 RX ADMIN — ACETAMINOPHEN 650 MG: 325 TABLET ORAL at 11:06

## 2023-06-15 RX ADMIN — Medication 6 MG: at 11:06

## 2023-06-15 RX ADMIN — POTASSIUM CHLORIDE 40 MEQ: 1500 TABLET, EXTENDED RELEASE ORAL at 07:06

## 2023-06-15 RX ADMIN — INSULIN ASPART 4 UNITS: 100 INJECTION, SOLUTION INTRAVENOUS; SUBCUTANEOUS at 05:06

## 2023-06-15 RX ADMIN — APIXABAN 5 MG: 5 TABLET, FILM COATED ORAL at 08:06

## 2023-06-15 RX ADMIN — INSULIN ASPART 2 UNITS: 100 INJECTION, SOLUTION INTRAVENOUS; SUBCUTANEOUS at 08:06

## 2023-06-15 RX ADMIN — ATORVASTATIN CALCIUM 40 MG: 40 TABLET, FILM COATED ORAL at 08:06

## 2023-06-15 RX ADMIN — Medication 800 MG: at 07:06

## 2023-06-15 NOTE — PLAN OF CARE
Problem: Physical Therapy  Goal: Physical Therapy Goal  Description: Goals to be met by: 23     Patient will increase functional independence with mobility by performin. Pt to be mod I with bed mobility.  2. Pt to transfer with supervision.  3. Pt to ambulate 150' w/RW (S).  4. Pt to be (I) with written HEP.    Outcome: Ongoing, Progressing   Initial eval completed, see in chart for details.

## 2023-06-15 NOTE — ASSESSMENT & PLAN NOTE
Ortho and PT consulted.  Reported previous Calcium pyrophosphate crystals.  Patient reports previous intra-articular steroid injection helped.

## 2023-06-15 NOTE — HPI
HPI: Mr Bowie is a 72-year-old -American male who presents the emergency room complaining of generally not feeling well, ongoing left knee pain, and some lightheadedness and dizziness that has gradually worsened over the last 24 to 48 hours.  His medical history is significant for hypertension, atrial flutter/fib on anticoagulation with Eliquis, chronic HFpEF, type 2 diabetes mellitus, BPH, CKD 4, coronary disease, dyslipidemia, anemia of chronic disease, DJD and known thyroid nodules.  He was recently discharged from the hospital on 6/6/2023 during which time he was having notable left knee pain, had known previous left knee large effusion that was evaluated by orthopedic surgery, underwent steroid injection on 6/5/2023 with some relief and was able to be transitioned back to home with home health care.  He reports that he has not seen orthopedics after discharge as of yet.  He did suffer a previous fall about 3 weeks ago and at baseline, uses walker to get around to a certain degree.     On arrival in the ER, he was noted to have hypotension with blood pressure of 98/50, heart rate of 44 and saturating 98% on room air.  He is CBC appears grossly unremarkable except mild anemia which appears to be chronic.  His BMP reviewed that shows hypokalemia with potassium of 2.5, creatinine of 3.6 with his baseline of about 2.6, elevated uric acid of 10.5.  His corrected calcium is 9.2.  His troponin is marginally elevated at 0.67 without active chest pains.  His chest x-ray does not demonstrate any acute findings. ER physician promptly discussed case with cardiologist on-call with recommendation of admission, close monitoring, his EKG may represent Wenke block, and replacing electrolytes and getting further cardiac work-up.  Is being admitted for further evaluation and treatment.      Denies CP or SOB  HR 45-60 Mobitz I AVB on telemetry  EKG sinus bradycardia Mobitz I AVB LBBB  K 2.5    Troponin 0.06    Echo  6/15/23  The left ventricle is normal in size with concentric hypertrophy and normal systolic function.  The estimated ejection fraction is 60%.  Grade II left ventricular diastolic dysfunction.  Normal right ventricular size with normal right ventricular systolic function.  Moderate left atrial enlargement.  There is moderate aortic valve stenosis.  Aortic valve area is 1.43 cm2; peak velocity is 3.94 m/s; mean gradient is 31 mmHg.  Mild-to-moderate aortic regurgitation.  Mild mitral regurgitation.  Normal central venous pressure (3 mmHg).  The estimated PA systolic pressure is 39 mmHg.     Saw our group during previous admissions  70-year-old male with CHF presents via EMS with chest pain, shortness of breath, mid abdominal pain, nausea, and sweats.  Patient was in his usual state of health until around 6:00 p.m. when he was resting and developed acute onset mid sternal chest pain associated with shortness of breath, sweats, and nausea.  Patient then vomited about 5 times, subsequently developing mid abdominal pain.  Mid abdominal pain persists and is sharp.  Patient feels weak.     Compliant with medications.     TTE 3/23/21  ·           The left ventricle is normal in size with normal systolic function. The estimated ejection fraction is 55%  ·           Grade III left ventricular diastolic dysfunction.  ·           Normal right ventricular size with normal right ventricular systolic function.  ·           Moderate left atrial enlargement.  ·           Mild right atrial enlargement.  ·           There is mild-to-moderate aortic valve stenosis.  ·           Aortic valve area is 1.90 cm2; peak velocity is 3.16 m/s; mean gradient is 22 mmHg.  ·           Mild aortic regurgitation.  ·           Mild mitral regurgitation.  ·           Mild to moderate tricuspid regurgitation.  ·           Normal central venous pressure (3 mmHg).  ·           The estimated PA systolic pressure is 53 mmHg.  ·           There is  pulmonary hypertension.     PMH: CHF, aflutter on Eliquis, CAD, DM2, HTN, DLD, rhabdomyolysis, arthritis, alcohol abuse  PSH: cardioversion, vascular surgery, eye surgery        Not providing much Hx. Currently denies CP or SOB  EKG A-flutter 88 NSSTT changes  Troponin 0.03  Cr 1.8       Saw me during admission 3/23/21  Noncompliant with amiodarone and eliquis  12/30/20 CV - 200J shock converted A-flutter to NSR     NST 11/25/20  Normal myocardial perfusion scan. There is no evidence of myocardial ischemia or infarction.    There is a  moderate intensity fixed defect in the inferior wall of the left ventricle secondary to diaphragm attenuation.    Gated perfusion images showed an ejection fraction of 48% post stress.    There is normal wall motion post stress.    The EKG portion of this study is abnormal but not diagnostic.    There were no arrhythmias during stress.     Echo 11/25/20  The left ventricle is normal in size with normal systolic function. The estimated ejection fraction is 55%.  There is left ventricular concentric hypertrophy.  Moderate left atrial enlargement.  Normal left ventricular diastolic function.  Normal right ventricular size with normal right ventricular systolic function.  Mild-to-moderate aortic valve stenosis.  Aortic valve area is 1.61 cm2; peak velocity is 3.23 m/s; mean gradient is 25 mmHg.  Moderate aortic regurgitation.  Mild mitral regurgitation.  No pulmonary hypertension

## 2023-06-15 NOTE — PLAN OF CARE
Case Management Assessment     PCP: Irlanda  Pharmacy: [facility]- [address]    Patient Arrived From: home  Existing Help at Home: Pt states his niece, Marylee comes by weekly.    Barriers to Discharge: Will need a taxi when discharged.    Discharge Plan:    A. Home   B. Home health         06/15/23 1033   Discharge Assessment   Assessment Type Discharge Planning Assessment   Confirmed/corrected address, phone number and insurance Yes   Confirmed Demographics Correct on Facesheet   Source of Information patient   When was your last doctors appointment?   (Pt doesn't remember last MD visit)   Reason For Admission Bradycardia   Facility Arrived From: home   Do you expect to return to your current living situation? Yes   Do you have help at home or someone to help you manage your care at home? Yes   Who are your caregiver(s) and their phone number(s)? Marylee- jero (pt doesn't remember phone number but she comes weekly)   Prior to hospitilization cognitive status: Alert/Oriented   Current cognitive status: Alert/Oriented   Walking or Climbing Stairs ambulation difficulty, requires equipment;stair climbing difficulty, requires equipment   Mobility Management cane and rollator   Dressing/Bathing bathing difficulty, requires equipment   Home Accessibility wheelchair accessible   Equipment Currently Used at Home rollator;cane, straight;power chair;walker, rolling   Readmission within 30 days? Yes   Patient currently being followed by outpatient case management? No   Do you currently have service(s) that help you manage your care at home? Yes   Name and Contact number of agency Pt has HH through [facility]   Is the pt/caregiver preference to resume services with current agency Yes   Do you take prescription medications? Yes   Do you have prescription coverage? Yes   Coverage [employer] [facility] HMO PPO   Do you have any problems affording any of your prescribed medications? No   Is the patient taking medications as prescribed? yes    Who is going to help you get home at discharge? Pt will need a taxi.   How do you get to doctors appointments? health plan transportation   Are you on dialysis? No   Do you take coumadin? No  (Eliquis)   Discharge Plan A Home   Discharge Plan B Home Health   DME Needed Upon Discharge  other (see comments)  (tbd)   Discharge Plan discussed with: Patient   Transition of Care Barriers None   Physical Activity   On average, how many days per week do you engage in moderate to strenuous exercise (like a brisk walk)? 0 days   On average, how many minutes do you engage in exercise at this level? 0 min   Financial Resource Strain   How hard is it for you to pay for the very basics like food, housing, medical care, and heating? Somewhat   Housing Stability   In the last 12 months, was there a time when you were not able to pay the mortgage or rent on time? N   In the last 12 months, how many places have you lived? 1   In the last 12 months, was there a time when you did not have a steady place to sleep or slept in a shelter (including now)? N   Transportation Needs   In the past 12 months, has lack of transportation kept you from medical appointments or from getting medications? no   In the past 12 months, has lack of transportation kept you from meetings, work, or from getting things needed for daily living? No   Food Insecurity   Within the past 12 months, you worried that your food would run out before you got the money to buy more. Never true   Within the past 12 months, the food you bought just didn't last and you didn't have money to get more. Never true   Stress   Do you feel stress - tense, restless, nervous, or anxious, or unable to sleep at night because your mind is troubled all the time - these days? To some exte   Social Connections   In a typical week, how many times do you talk on the phone with family, friends, or neighbors? Once a week   How often do you get together with friends or relatives? Once   How  often do you attend Judaism or Cheondoism services? Never   Do you belong to any clubs or organizations such as Judaism groups, unions, fraternal or athletic groups, or school groups? No   How often do you attend meetings of the clubs or organizations you belong to? Never   Are you , , , , never , or living with a partner? Never marrie   Alcohol Use   Q1: How often do you have a drink containing alcohol? Never   Q2: How many drinks containing alcohol do you have on a typical day when you are drinking? None   Q3: How often do you have six or more drinks on one occasion? Never

## 2023-06-15 NOTE — ASSESSMENT & PLAN NOTE
A1c:   Lab Results   Component Value Date    HGBA1C 7.8 (H) 06/02/2023     Meds: SSI PRN to maintain goal 140-180  ADA diet, accuchecks ACHS, hypoglycemic protocol

## 2023-06-15 NOTE — NURSING
[facility]  Nurses Note -- 4 Eyes      6/15/2023       Skin assessed on: Q Shift      [x] No Pressure Injuries Present    [x]Prevention Measures Documented    [] Yes LDA  for Pressure Injury Previously documented     [] Yes New Pressure Injury Discovered   [] LDA for New Pressure Injury Added      Attending RN:  AMAIRANI PADILLA RN     Second RN:  Enrique GREER RN

## 2023-06-15 NOTE — CONSULTS
C.C. left knee pain    HPI: Chato Graves72 y.o. complaining of left knee pain.  The patient was recently admitted for other reasons and underwent a left knee injection for gout arthritis was only approximately 3 weeks ago.  Again he suffering with similar complaints.  Is not appear he has any karen infection.  He is also being worked up for his hypertension.  At this point we discussed with the patient we feel he would be candidate for a left knee replacement if he is able be optimized however do not feel be appropriate to inject his left knee again today since it has only been 3 weeks he does have other significant medical problems such as diabetes.  Would like him to follow back up with this as an outpatient I recommend physical therapy and weight-bearing as tolerated    ROS:   Pertinent positives:  Left knee pain       PMH:   Past Medical History:   Diagnosis Date    Acute congestive heart failure 3/20/2020    Alcohol abuse     Arthritis     Asthma attack 11/24/2021    Coronary artery disease     Diabetes mellitus     Hyperlipemia     Hypertension     Multiple thyroid nodules 6/14/2023    Rhabdomyolysis        PSH:   Past Surgical History:   Procedure Laterality Date    EYE SURGERY      TRANSESOPHAGEAL ECHOCARDIOGRAM WITH POSSIBLE CARDIOVERSION (MARIA W/ POSS CARDIOVERSION) N/A 1/5/2023    Procedure: Transesophageal echo (MARIA) intra-procedure log documentation;  Surgeon: Indra Foote MD;  Location: [facility] CATH LAB;  Service: Cardiology;  Laterality: N/A;    TREATMENT OF CARDIAC ARRHYTHMIA N/A 12/7/2020    Procedure: Cardioversion or Defibrillation;  Surgeon: Indra Foote MD;  Location: [facility] CATH LAB;  Service: Cardiology;  Laterality: N/A;    TREATMENT OF CARDIAC ARRHYTHMIA N/A 12/30/2020    Procedure: Cardioversion or Defibrillation;  Surgeon: Tyrone Steen MD;  Location: [facility] CATH LAB;  Service: Cardiology;  Laterality: N/A;    TREATMENT OF CARDIAC ARRHYTHMIA N/A 1/5/2023    Procedure:  Cardioversion or Defibrillation;  Surgeon: Indra Foote MD;  Location: [facility] CATH LAB;  Service: Cardiology;  Laterality: N/A;    VASCULAR SURGERY         Social Hx:   Social History     Occupational History    Not on file   Tobacco Use    Smoking status: Never    Smokeless tobacco: Never   Substance and Sexual Activity    Alcohol use: Not Currently     Alcohol/week: 6.0 standard drinks     Types: 6 Cans of beer per week     Comment: daily    Drug use: No    Sexual activity: Yes     Partners: Female       Medications:    No current facility-administered medications on file prior to encounter.     Current Outpatient Medications on File Prior to Encounter   Medication Sig Dispense Refill    amiodarone (PACERONE) 200 MG Tab Take 1 tablet (200 mg total) by mouth once daily. 30 tablet 11    amLODIPine (NORVASC) 10 MG tablet Take 10 mg by mouth Daily.      ELIQUIS 5 mg Tab Take 5 mg by mouth 2 (two) times daily.      furosemide (LASIX) 40 MG tablet Take 80 mg by mouth 2 (two) times daily.      losartan-hydrochlorothiazide 50-12.5 mg (HYZAAR) 50-12.5 mg per tablet Take 1 tablet by mouth once daily.      magnesium oxide (MAG-OX) 400 mg (241.3 mg magnesium) tablet Take 800 mg by mouth.      metoprolol succinate (TOPROL-XL) 25 MG 24 hr tablet Take 25 mg by mouth.      NOVOLIN 70/30 U-100 INSULIN 100 unit/mL (70-30) injection Inject 55 Units into the skin 2 (two) times daily.      potassium chloride (KLOR-CON) 10 MEQ TbSR Take 10 mEq by mouth once daily.      rosuvastatin (CRESTOR) 40 MG Tab Take 40 mg by mouth every evening.      tamsulosin (FLOMAX) 0.4 mg Cap Take 1 capsule (0.4 mg total) by mouth once daily. 30 capsule 11    albuterol (PROVENTIL/VENTOLIN HFA) 90 mcg/actuation inhaler Inhale 2 puffs into the lungs every 4 (four) hours as needed. 8.5 g 5    diclofenac sodium (VOLTAREN) 1 % Gel Apply 2 g topically 2 (two) times daily as needed (pain). 100 g 0    hydrALAZINE (APRESOLINE) 50 MG tablet Take 1.5 tablets (75  "mg total) by mouth 3 (three) times daily. 90 tablet 1    insulin aspart U-100 (NOVOLOG) 100 unit/mL (3 mL) InPn pen Inject 5 Units into the skin 3 (three) times daily. 4.5 mL 11    sildenafiL (VIAGRA) 100 MG tablet TAKE 1 TABLET BY MOUTH ONCE DAILY AS NEEDED FOR ERECTILE DYSFUNCTION. 30 tablet 0         PE:         Vitals:    06/15/23 1136   BP: (!) 107/55   Pulse: 69   Resp: 18   Temp: 97.5 °F (36.4 °C)       Estimated body mass index is 28.87 kg/m² as calculated from the following:    Height as of this encounter: 5' 11" (1.803 m).    Weight as of this encounter: 93.9 kg (207 lb).     General WDWN, NAD     Extremity:  Examination left knee there is a mild effusion.  He has overall bulkiness am swelling to left knee compared to the right.  There is no cellulitis no erythema no worrisome signs for infection.  Decreased range of motion approximately 5° to 90°.    Labs:    Lab Results   Component Value Date    WBC 5.28 06/15/2023    HGB 13.8 (L) 06/15/2023    HCT 42.9 06/15/2023    MCV 83 06/15/2023     06/15/2023           BMP  Lab Results   Component Value Date     (L) 06/15/2023    K 3.3 (L) 06/15/2023    CL 95 06/15/2023    CO2 25 06/15/2023     (H) 06/15/2023    CREATININE 3.3 (H) 06/15/2023    CALCIUM 9.9 06/15/2023    ANIONGAP 14 06/15/2023    EGFRNORACEVR 19 (A) 06/15/2023       Lab Results   Component Value Date    INR 1.1 01/30/2023    INR 1.1 09/05/2021    INR 1.1 03/23/2021       Lab Results   Component Value Date    SEDRATE 55 (H) 05/20/2023       Lab Results   Component Value Date    .8 (H) 05/20/2023       Radiography:  Film    Interpretation    X-rays left knee show no obvious fracture dislocations.  He does have degenerative changes to left knee with arthrosis    A/P  72 y.o.male with left knee arthritis in gout.  He has already failed an injection to left knee approximately 3 weeks ago he does have diabetes and other medical problems I feel may be unreasonable to inject the " knee again today plan for weight-bearing as tolerated as well as physical therapy and strengthening will see him back in the office to continue to discuss a left knee replacement.      Felix Guzman MD

## 2023-06-15 NOTE — NURSING
[facility] [place]  Nurses Note -- 4 Eyes      6/15/2023       Skin assessed on: Q Shift      [x] No Pressure Injuries Present    []Prevention Measures Documented    [] Yes LDA  for Pressure Injury Previously documented     [] Yes New Pressure Injury Discovered   [] LDA for New Pressure Injury Added      Attending RN:  Enrique Haas, RN     Second RN:  Lilia

## 2023-06-15 NOTE — NURSING
Pt had a diet order change and made NPO  after he consumed half of 1 toast and some eggs. New ekg order and competed. Primary and Cards made aware.

## 2023-06-15 NOTE — ASSESSMENT & PLAN NOTE
-Presented with weakness, fatigue and lightheadedness. Hypotensive and bradycardic in the ED.   -Suspected AV dissociation.  -ER physician has discussed case with cardiology who is aware about the case.  -Hold AV iker blocking agents.  -Check TFTs: TSH 3.5  -UDS negative   -Echocardiogram with EF 60%, G2DD. Moderate AV stenosis   -Cardiology consulted: plan for PPM ib 06/16  -Amio on hold for now.

## 2023-06-15 NOTE — PT/OT/SLP EVAL
Physical Therapy Evaluation     Patient Name: Chato Bowie   MRN: [id number]  Recent Surgery: * No surgery found *      Recommendations:     Discharge Recommendations: home health PT   Discharge Equipment Recommendations: none       Assessment:     Chato Bowie is a 72 y.o. male admitted with a medical diagnosis of Bradycardia. He presents with the following impairments/functional limitations: impaired functional mobility, gait instability.     Rehab Prognosis: Good; patient would benefit from acute PT services to address these deficits and reach maximum level of function.    Plan:     During this hospitalization, patient to be seen 3 x/week to address the above listed problems via gait training, therapeutic activities, therapeutic exercises    Plan of Care Expires: 06/20/23      Subjective     Chief Complaint: (L) knee pain  Patient Comments/Goals: Pt agreeable to therapy evaluation.  Pain/Comfort:  Pain Rating 1: 9/10  Location - Side 1: Left  Location 1: knee  Pain Addressed 1: Reposition    Social History:  Living Environment: Patient lives alone in an apartment with elevator  Prior Level of Function: Prior to admission, patient was modified independent  Equipment Used at Home: rollator, cane, straight, grab bar   DME owned (not currently used): none  Assistance Upon Discharge: unknown    Objective:     Communicated with nurseEnrique prior to session. Patient found supine with telemetry upon PT entry to room.    General Precautions: Standard, fall   Orthopedic Precautions:     Braces: N/A    Respiratory Status: Room air    Exams:  Cognition: Patient is oriented to Person, Place, Time, Situation  RLE ROM: WFL  RLE Strength: WFL  LLE ROM: WFL  LLE Strength: WFL      Functional Mobility:  Gait belt applied - Yes  Bed Mobility  Supine to sit: supervision  Transfers  Sit to Stand: stand by assistance with rolling walker  Gait  Patient ambulated 5' with rolling walker and contact guard assistance.   Balance  Sitting:  supervision  Standing: contact guard assistance      Therapeutic Activities and Exercises:   Patient educated on role of acute care PT and PT POC  Pt with hr 48; no c/o, reports ability to bent LLE and sit EOB today and unable on yesterday.    AM-PAC 6 CLICK MOBILITY  Total Score:20    Patient left up in chair with call button in reach, RN notified, and all needs in reach .    GOALS:   Multidisciplinary Problems       Physical Therapy Goals          Problem: Physical Therapy    Goal Priority Disciplines Outcome Goal Variances Interventions   Physical Therapy Goal     PT, PT/OT Ongoing, Progressing     Description: Goals to be met by: 23     Patient will increase functional independence with mobility by performin. Pt to be mod I with bed mobility.  2. Pt to transfer with supervision.  3. Pt to ambulate 150' w/RW (S).  4. Pt to be (I) with written HEP.                         History:     Past Medical History:   Diagnosis Date    Acute congestive heart failure 3/20/2020    Alcohol abuse     Arthritis     Asthma attack 2021    Coronary artery disease     Diabetes mellitus     Hyperlipemia     Hypertension     Multiple thyroid nodules 2023    Rhabdomyolysis        Past Surgical History:   Procedure Laterality Date    EYE SURGERY      TRANSESOPHAGEAL ECHOCARDIOGRAM WITH POSSIBLE CARDIOVERSION (MARIA W/ POSS CARDIOVERSION) N/A 2023    Procedure: Transesophageal echo (MARIA) intra-procedure log documentation;  Surgeon: Indra Foote MD;  Location: [facility] CATH LAB;  Service: Cardiology;  Laterality: N/A;    TREATMENT OF CARDIAC ARRHYTHMIA N/A 2020    Procedure: Cardioversion or Defibrillation;  Surgeon: Indra Foote MD;  Location: [facility] CATH LAB;  Service: Cardiology;  Laterality: N/A;    TREATMENT OF CARDIAC ARRHYTHMIA N/A 2020    Procedure: Cardioversion or Defibrillation;  Surgeon: Tyrone Steen MD;  Location: [facility] CATH LAB;  Service: Cardiology;  Laterality: N/A;     TREATMENT OF CARDIAC ARRHYTHMIA N/A 1/5/2023    Procedure: Cardioversion or Defibrillation;  Surgeon: Indra Foote MD;  Location: [facility] CATH LAB;  Service: Cardiology;  Laterality: N/A;    VASCULAR SURGERY         Time Tracking:     PT Received On: 06/15/23  PT Start Time: 0956  PT Stop Time: 1011  PT Total Time (min): 15 min     Billable Minutes: Evaluation 15    6/15/2023

## 2023-06-15 NOTE — ASSESSMENT & PLAN NOTE
Ortho and PT consulted.  Reported previous Calcium pyrophosphate crystals.  Patient reports previous intra-articular steroid injection helped.  Does not appear like septic joint  Suspect due to osteoarthritis  Continue pain control

## 2023-06-15 NOTE — SUBJECTIVE & OBJECTIVE
Past Medical History:   Diagnosis Date    Acute congestive heart failure 3/20/2020    Alcohol abuse     Arthritis     Asthma attack 11/24/2021    Coronary artery disease     Diabetes mellitus     Hyperlipemia     Hypertension     Multiple thyroid nodules 6/14/2023    Rhabdomyolysis        Past Surgical History:   Procedure Laterality Date    EYE SURGERY      TRANSESOPHAGEAL ECHOCARDIOGRAM WITH POSSIBLE CARDIOVERSION (MARIA W/ POSS CARDIOVERSION) N/A 1/5/2023    Procedure: Transesophageal echo (MARIA) intra-procedure log documentation;  Surgeon: Indra Foote MD;  Location: [facility] CATH LAB;  Service: Cardiology;  Laterality: N/A;    TREATMENT OF CARDIAC ARRHYTHMIA N/A 12/7/2020    Procedure: Cardioversion or Defibrillation;  Surgeon: Indra Foote MD;  Location: [facility] CATH LAB;  Service: Cardiology;  Laterality: N/A;    TREATMENT OF CARDIAC ARRHYTHMIA N/A 12/30/2020    Procedure: Cardioversion or Defibrillation;  Surgeon: Tyrone Steen MD;  Location: [facility] CATH LAB;  Service: Cardiology;  Laterality: N/A;    TREATMENT OF CARDIAC ARRHYTHMIA N/A 1/5/2023    Procedure: Cardioversion or Defibrillation;  Surgeon: Indra Foote MD;  Location: [facility] CATH LAB;  Service: Cardiology;  Laterality: N/A;    VASCULAR SURGERY         Review of patient's allergies indicates:  No Known Allergies    No current facility-administered medications on file prior to encounter.     Current Outpatient Medications on File Prior to Encounter   Medication Sig    amiodarone (PACERONE) 200 MG Tab Take 1 tablet (200 mg total) by mouth once daily.    amLODIPine (NORVASC) 10 MG tablet Take 10 mg by mouth Daily.    ELIQUIS 5 mg Tab Take 5 mg by mouth 2 (two) times daily.    furosemide (LASIX) 40 MG tablet Take 80 mg by mouth 2 (two) times daily.    losartan-hydrochlorothiazide 50-12.5 mg (HYZAAR) 50-12.5 mg per tablet Take 1 tablet by mouth once daily.    magnesium oxide (MAG-OX) 400 mg (241.3 mg magnesium) tablet Take 800 mg by mouth.     metoprolol succinate (TOPROL-XL) 25 MG 24 hr tablet Take 25 mg by mouth.    NOVOLIN 70/30 U-100 INSULIN 100 unit/mL (70-30) injection Inject 55 Units into the skin 2 (two) times daily.    potassium chloride (KLOR-CON) 10 MEQ TbSR Take 10 mEq by mouth once daily.    rosuvastatin (CRESTOR) 40 MG Tab Take 40 mg by mouth every evening.    tamsulosin (FLOMAX) 0.4 mg Cap Take 1 capsule (0.4 mg total) by mouth once daily.    albuterol (PROVENTIL/VENTOLIN HFA) 90 mcg/actuation inhaler Inhale 2 puffs into the lungs every 4 (four) hours as needed.    diclofenac sodium (VOLTAREN) 1 % Gel Apply 2 g topically 2 (two) times daily as needed (pain).    hydrALAZINE (APRESOLINE) 50 MG tablet Take 1.5 tablets (75 mg total) by mouth 3 (three) times daily.    insulin aspart U-100 (NOVOLOG) 100 unit/mL (3 mL) InPn pen Inject 5 Units into the skin 3 (three) times daily.    sildenafiL (VIAGRA) 100 MG tablet TAKE 1 TABLET BY MOUTH ONCE DAILY AS NEEDED FOR ERECTILE DYSFUNCTION.     Family History       Problem Relation (Age of Onset)    Diabetes Mother, Father, Sister    Hypertension Mother, Father, Sister          Tobacco Use    Smoking status: Never    Smokeless tobacco: Never   Substance and Sexual Activity    Alcohol use: Not Currently     Alcohol/week: 6.0 standard drinks     Types: 6 Cans of beer per week     Comment: daily    Drug use: No    Sexual activity: Yes     Partners: Female     Review of Systems   Constitutional: Negative for decreased appetite.   HENT:  Negative for ear discharge.    Eyes:  Negative for blurred vision.   Endocrine: Negative for polyphagia.   Skin:  Negative for nail changes.   Genitourinary:  Negative for bladder incontinence.   Neurological:  Negative for aphonia.   Psychiatric/Behavioral:  Negative for hallucinations.    Allergic/Immunologic: Negative for hives.   Objective:     Vital Signs (Most Recent):  Temp: 97.5 °F (36.4 °C) (06/15/23 1136)  Pulse: 69 (06/15/23 1136)  Resp: 18 (06/15/23 1136)  BP: (!)  107/55 (06/15/23 1136)  SpO2: 98 % (06/15/23 1136) Vital Signs (24h Range):  Temp:  [97.3 °F (36.3 °C)-98.3 °F (36.8 °C)] 97.5 °F (36.4 °C)  Pulse:  [41-69] 69  Resp:  [15-23] 18  SpO2:  [96 %-100 %] 98 %  BP: ()/() 107/55     Weight: 93.9 kg (207 lb)  Body mass index is 28.87 kg/m².    SpO2: 98 %         Intake/Output Summary (Last 24 hours) at 6/15/2023 1213  Last data filed at 6/15/2023 0139  Gross per 24 hour   Intake --   Output 650 ml   Net -650 ml       Lines/Drains/Airways       Peripheral Intravenous Line  Duration                  Peripheral IV - Single Lumen 06/14/23 18 G Left Antecubital 1 day                     Physical Exam  Constitutional:       Appearance: He is well-developed.   HENT:      Head: Normocephalic and atraumatic.   Eyes:      Conjunctiva/sclera: Conjunctivae normal.      Pupils: Pupils are equal, round, and reactive to light.   Cardiovascular:      Rate and Rhythm: Bradycardia present.      Pulses: Intact distal pulses.      Heart sounds: Murmur heard.   Harsh midsystolic murmur is present with a grade of 2/6 at the upper right sternal border radiating to the neck.   Pulmonary:      Effort: Pulmonary effort is normal.      Breath sounds: Normal breath sounds.   Abdominal:      General: Bowel sounds are normal.      Palpations: Abdomen is soft.   Musculoskeletal:         General: Normal range of motion.      Cervical back: Normal range of motion and neck supple.   Skin:     General: Skin is warm and dry.   Neurological:      Mental Status: He is alert and oriented to person, place, and time.        Significant Labs: All pertinent lab results from the last 24 hours have been reviewed.    Significant Imaging: Echocardiogram: 2D echo with color flow doppler:   Results for orders placed or performed during the hospital encounter of 03/09/17   2D echo with color flow doppler   Result Value Ref Range    EF + QEF 55 55 - 65    Diastolic Dysfunction Yes (A)     Aortic Valve  Regurgitation MILD     Aortic Valve Stenosis MILD (A)     Est. PA Systolic Pressure 15.53     Narrative    Date of Procedure: 03/10/2017        TEST DESCRIPTION       Aorta: The aortic root is normal in size, measuring 2.8 cm at sinotubular junction and 3.1 cm at Sinuses of Valsalva. The proximal ascending aorta is normal in size, measuring 2.6 cm across.     Left Atrium: The left atrial volume index is severely enlarged, measuring 52.83 cc/m2.     Left Ventricle: The left ventricle is normal in size, with an end-diastolic diameter of 4.0 cm, and an end-systolic diameter of 2.9 cm. Wall thickness is increased, with the septum measuring 1.7 cm and the posterior wall measuring 1.9 cm across. Relative   wall thickness was increased at 0.95, and the LV mass index was increased at 174.3 g/m2 consistent with concentric left ventricular hypertrophy. Global left ventricular systolic function appears normal. Visually estimated ejection fraction is 55-60%.   The LV Doppler derived stroke volume equals 92.0 ccs.   The E/e'(lat) is 21, consistent with significant diastolic dysfunction.     Right Atrium: The right atrium is normal in size, measuring 4.1 cm in length and 3.4 cm in width in the apical view.     Right Ventricle: The right ventricle is normal in size measuring 3.7 cm at the base in the apical right ventricle-focused view. Global right ventricular systolic function appears normal. Tricuspid annular plane systolic excursion (TAPSE) is 2.5 cm.   Tissue Doppler-derived tricuspid annular peak systolic velocity (S prime) is 15.8 cm/s. The estimated PA systolic pressure is 16 mmHg.     Aortic Valve:  The aortic valve is moderately sclerotic. The peak gradient obtained across the aortic valve is 30.0 mmHg, with a mean gradient of 16.7 mmHg. Using a left ventricular outflow tract diameter of 2.3 cm, a left ventricular outflow tract   velocity time integral of 23 cm, and a peak instantaneous transvalvular velocity time  integral of 47 cm, the calculated aortic valve area is 1.95 cm2, consistent with mild aortic stenosis. Additionally, there is mild aortic regurgitation.     IVC: IVC is normal in size and collapses > 50% with a sniff, suggesting normal right atrial pressure of 3 mmHg.     Intracavitary: There is no evidence of pericardial effusion, intracavity mass, thrombi, or vegetation.         CONCLUSIONS     1 - Normal left ventricular systolic function (EF 55-60%).     2 - Concentric hypertrophy.     3 - Left ventricular diastolic dysfunction.     4 - Left atrial enlargement.     5 - Mild aortic stenosis, BASIL = 1.95 cm2, mean gradient = 16.7 mmHg.     6 - Mild aortic regurgitation.             This document has been electronically    SIGNED BY: Tyrone Steen MD On: 03/10/2017 12:04

## 2023-06-15 NOTE — CONSULTS
[facility] - Telemetry  Cardiology  Consult Note    Patient Name: Chato Bowie  MRN: [id number]  Admission Date: 6/14/2023  Hospital Length of Stay: 1 days  Code Status: Full Code   Attending Provider: Tiffanie Rojas DO   Consulting Provider: Tyrone Steen MD  Primary Care Physician: Irlanda Valenzuela  Principal Problem:Bradycardia    Patient information was obtained from patient and ER records.     Inpatient consult to Cardiology  Consult performed by: Tyrone Steen MD  Consult ordered by: Kings Henderson MD        Subjective:     Chief Complaint:  bradycardia       HPI: Mr Bowie is a 72-year-old -American male who presents the emergency room complaining of generally not feeling well, ongoing left knee pain, and some lightheadedness and dizziness that has gradually worsened over the last 24 to 48 hours.  His medical history is significant for hypertension, atrial flutter/fib on anticoagulation with Eliquis, chronic HFpEF, type 2 diabetes mellitus, BPH, CKD 4, coronary disease, dyslipidemia, anemia of chronic disease, DJD and known thyroid nodules.  He was recently discharged from the hospital on 6/6/2023 during which time he was having notable left knee pain, had known previous left knee large effusion that was evaluated by orthopedic surgery, underwent steroid injection on 6/5/2023 with some relief and was able to be transitioned back to home with home health care.  He reports that he has not seen orthopedics after discharge as of yet.  He did suffer a previous fall about 3 weeks ago and at baseline, uses walker to get around to a certain degree.     On arrival in the ER, he was noted to have hypotension with blood pressure of 98/50, heart rate of 44 and saturating 98% on room air.  He is CBC appears grossly unremarkable except mild anemia which appears to be chronic.  His BMP reviewed that shows hypokalemia with potassium of 2.5, creatinine of 3.6 with his baseline of about 2.6, elevated uric  acid of 10.5.  His corrected calcium is 9.2.  His troponin is marginally elevated at 0.67 without active chest pains.  His chest x-ray does not demonstrate any acute findings. ER physician promptly discussed case with cardiologist on-call with recommendation of admission, close monitoring, his EKG may represent Wenke block, and replacing electrolytes and getting further cardiac work-up.  Is being admitted for further evaluation and treatment.      Denies CP or SOB  HR 45-60 Mobitz I AVB on telemetry  EKG sinus bradycardia Mobitz I AVB LBBB  K 2.5    Troponin 0.06    Echo 6/15/23   The left ventricle is normal in size with concentric hypertrophy and normal systolic function.   The estimated ejection fraction is 60%.   Grade II left ventricular diastolic dysfunction.   Normal right ventricular size with normal right ventricular systolic function.   Moderate left atrial enlargement.   There is moderate aortic valve stenosis.   Aortic valve area is 1.43 cm2; peak velocity is 3.94 m/s; mean gradient is 31 mmHg.   Mild-to-moderate aortic regurgitation.   Mild mitral regurgitation.   Normal central venous pressure (3 mmHg).   The estimated PA systolic pressure is 39 mmHg.     Saw our group during previous admissions  70-year-old male with CHF presents via EMS with chest pain, shortness of breath, mid abdominal pain, nausea, and sweats.  Patient was in his usual state of health until around 6:00 p.m. when he was resting and developed acute onset mid sternal chest pain associated with shortness of breath, sweats, and nausea.  Patient then vomited about 5 times, subsequently developing mid abdominal pain.  Mid abdominal pain persists and is sharp.  Patient feels weak.     Compliant with medications.     TTE 3/23/21  ·           The left ventricle is normal in size with normal systolic function. The estimated ejection fraction is 55%  ·           Grade III left ventricular diastolic dysfunction.  ·            Normal right ventricular size with normal right ventricular systolic function.  ·           Moderate left atrial enlargement.  ·           Mild right atrial enlargement.  ·           There is mild-to-moderate aortic valve stenosis.  ·           Aortic valve area is 1.90 cm2; peak velocity is 3.16 m/s; mean gradient is 22 mmHg.  ·           Mild aortic regurgitation.  ·           Mild mitral regurgitation.  ·           Mild to moderate tricuspid regurgitation.  ·           Normal central venous pressure (3 mmHg).  ·           The estimated PA systolic pressure is 53 mmHg.  ·           There is pulmonary hypertension.     PMH: CHF, aflutter on Eliquis, CAD, DM2, HTN, DLD, rhabdomyolysis, arthritis, alcohol abuse  PSH: cardioversion, vascular surgery, eye surgery        Not providing much Hx. Currently denies CP or SOB  EKG A-flutter 88 NSSTT changes  Troponin 0.03  Cr 1.8       Saw me during admission 3/23/21  Noncompliant with amiodarone and eliquis  12/30/20 CV - 200J shock converted A-flutter to NSR     NST 11/25/20  Normal myocardial perfusion scan. There is no evidence of myocardial ischemia or infarction.    There is a  moderate intensity fixed defect in the inferior wall of the left ventricle secondary to diaphragm attenuation.    Gated perfusion images showed an ejection fraction of 48% post stress.    There is normal wall motion post stress.    The EKG portion of this study is abnormal but not diagnostic.    There were no arrhythmias during stress.     Echo 11/25/20   The left ventricle is normal in size with normal systolic function. The estimated ejection fraction is 55%.   There is left ventricular concentric hypertrophy.   Moderate left atrial enlargement.   Normal left ventricular diastolic function.   Normal right ventricular size with normal right ventricular systolic function.   Mild-to-moderate aortic valve stenosis.   Aortic valve area is 1.61 cm2; peak velocity is 3.23 m/s; mean  gradient is 25 mmHg.   Moderate aortic regurgitation.   Mild mitral regurgitation.   No pulmonary hypertension           Past Medical History:   Diagnosis Date    Acute congestive heart failure 3/20/2020    Alcohol abuse     Arthritis     Asthma attack 11/24/2021    Coronary artery disease     Diabetes mellitus     Hyperlipemia     Hypertension     Multiple thyroid nodules 6/14/2023    Rhabdomyolysis        Past Surgical History:   Procedure Laterality Date    EYE SURGERY      TRANSESOPHAGEAL ECHOCARDIOGRAM WITH POSSIBLE CARDIOVERSION (MARIA W/ POSS CARDIOVERSION) N/A 1/5/2023    Procedure: Transesophageal echo (MARIA) intra-procedure log documentation;  Surgeon: Indra Foote MD;  Location: [facility] system CATH LAB;  Service: Cardiology;  Laterality: N/A;    TREATMENT OF CARDIAC ARRHYTHMIA N/A 12/7/2020    Procedure: Cardioversion or Defibrillation;  Surgeon: Indra Foote MD;  Location: Health system CATH LAB;  Service: Cardiology;  Laterality: N/A;    TREATMENT OF CARDIAC ARRHYTHMIA N/A 12/30/2020    Procedure: Cardioversion or Defibrillation;  Surgeon: Tyrone Steen MD;  Location: Health system CATH LAB;  Service: Cardiology;  Laterality: N/A;    TREATMENT OF CARDIAC ARRHYTHMIA N/A 1/5/2023    Procedure: Cardioversion or Defibrillation;  Surgeon: Indra Foote MD;  Location: Health system CATH LAB;  Service: Cardiology;  Laterality: N/A;    VASCULAR SURGERY         Review of patient's allergies indicates:  No Known Allergies    No current facility-administered medications on file prior to encounter.     Current Outpatient Medications on File Prior to Encounter   Medication Sig    amiodarone (PACERONE) 200 MG Tab Take 1 tablet (200 mg total) by mouth once daily.    amLODIPine (NORVASC) 10 MG tablet Take 10 mg by mouth Daily.    ELIQUIS 5 mg Tab Take 5 mg by mouth 2 (two) times daily.    furosemide (LASIX) 40 MG tablet Take 80 mg by mouth 2 (two) times daily.    losartan-hydrochlorothiazide 50-12.5 mg (HYZAAR)  50-12.5 mg per tablet Take 1 tablet by mouth once daily.    magnesium oxide (MAG-OX) 400 mg (241.3 mg magnesium) tablet Take 800 mg by mouth.    metoprolol succinate (TOPROL-XL) 25 MG 24 hr tablet Take 25 mg by mouth.    NOVOLIN 70/30 U-100 INSULIN 100 unit/mL (70-30) injection Inject 55 Units into the skin 2 (two) times daily.    potassium chloride (KLOR-CON) 10 MEQ TbSR Take 10 mEq by mouth once daily.    rosuvastatin (CRESTOR) 40 MG Tab Take 40 mg by mouth every evening.    tamsulosin (FLOMAX) 0.4 mg Cap Take 1 capsule (0.4 mg total) by mouth once daily.    albuterol (PROVENTIL/VENTOLIN HFA) 90 mcg/actuation inhaler Inhale 2 puffs into the lungs every 4 (four) hours as needed.    diclofenac sodium (VOLTAREN) 1 % Gel Apply 2 g topically 2 (two) times daily as needed (pain).    hydrALAZINE (APRESOLINE) 50 MG tablet Take 1.5 tablets (75 mg total) by mouth 3 (three) times daily.    insulin aspart U-100 (NOVOLOG) 100 unit/mL (3 mL) InPn pen Inject 5 Units into the skin 3 (three) times daily.    sildenafiL (VIAGRA) 100 MG tablet TAKE 1 TABLET BY MOUTH ONCE DAILY AS NEEDED FOR ERECTILE DYSFUNCTION.     Family History       Problem Relation (Age of Onset)    Diabetes Mother, Father, Sister    Hypertension Mother, Father, Sister          Tobacco Use    Smoking status: Never    Smokeless tobacco: Never   Substance and Sexual Activity    Alcohol use: Not Currently     Alcohol/week: 6.0 standard drinks     Types: 6 Cans of beer per week     Comment: daily    Drug use: No    Sexual activity: Yes     Partners: Female     Review of Systems   Constitutional: Negative for decreased appetite.   HENT:  Negative for ear discharge.    Eyes:  Negative for blurred vision.   Endocrine: Negative for polyphagia.   Skin:  Negative for nail changes.   Genitourinary:  Negative for bladder incontinence.   Neurological:  Negative for aphonia.   Psychiatric/Behavioral:  Negative for hallucinations.    Allergic/Immunologic:  Negative for hives.   Objective:     Vital Signs (Most Recent):  Temp: 97.5 °F (36.4 °C) (06/15/23 1136)  Pulse: 69 (06/15/23 1136)  Resp: 18 (06/15/23 1136)  BP: (!) 107/55 (06/15/23 1136)  SpO2: 98 % (06/15/23 1136) Vital Signs (24h Range):  Temp:  [97.3 °F (36.3 °C)-98.3 °F (36.8 °C)] 97.5 °F (36.4 °C)  Pulse:  [41-69] 69  Resp:  [15-23] 18  SpO2:  [96 %-100 %] 98 %  BP: ()/() 107/55     Weight: 93.9 kg (207 lb)  Body mass index is 28.87 kg/m².    SpO2: 98 %         Intake/Output Summary (Last 24 hours) at 6/15/2023 1213  Last data filed at 6/15/2023 0139  Gross per 24 hour   Intake --   Output 650 ml   Net -650 ml       Lines/Drains/Airways       Peripheral Intravenous Line  Duration                  Peripheral IV - Single Lumen 06/14/23 18 G Left Antecubital 1 day                     Physical Exam  Constitutional:       Appearance: He is well-developed.   HENT:      Head: Normocephalic and atraumatic.   Eyes:      Conjunctiva/sclera: Conjunctivae normal.      Pupils: Pupils are equal, round, and reactive to light.   Cardiovascular:      Rate and Rhythm: Bradycardia present.      Pulses: Intact distal pulses.      Heart sounds: Murmur heard.   Harsh midsystolic murmur is present with a grade of 2/6 at the upper right sternal border radiating to the neck.   Pulmonary:      Effort: Pulmonary effort is normal.      Breath sounds: Normal breath sounds.   Abdominal:      General: Bowel sounds are normal.      Palpations: Abdomen is soft.   Musculoskeletal:         General: Normal range of motion.      Cervical back: Normal range of motion and neck supple.   Skin:     General: Skin is warm and dry.   Neurological:      Mental Status: He is alert and oriented to person, place, and time.        Significant Labs: All pertinent lab results from the last 24 hours have been reviewed.    Significant Imaging: Echocardiogram: 2D echo with color flow doppler:   Results for orders placed or performed during the  hospital encounter of 03/09/17   2D echo with color flow doppler   Result Value Ref Range    EF + QEF 55 55 - 65    Diastolic Dysfunction Yes (A)     Aortic Valve Regurgitation MILD     Aortic Valve Stenosis MILD (A)     Est. PA Systolic Pressure 15.53     Narrative    Date of Procedure: 03/10/2017        TEST DESCRIPTION       Aorta: The aortic root is normal in size, measuring 2.8 cm at sinotubular junction and 3.1 cm at Sinuses of Valsalva. The proximal ascending aorta is normal in size, measuring 2.6 cm across.     Left Atrium: The left atrial volume index is severely enlarged, measuring 52.83 cc/m2.     Left Ventricle: The left ventricle is normal in size, with an end-diastolic diameter of 4.0 cm, and an end-systolic diameter of 2.9 cm. Wall thickness is increased, with the septum measuring 1.7 cm and the posterior wall measuring 1.9 cm across. Relative   wall thickness was increased at 0.95, and the LV mass index was increased at 174.3 g/m2 consistent with concentric left ventricular hypertrophy. Global left ventricular systolic function appears normal. Visually estimated ejection fraction is 55-60%.   The LV Doppler derived stroke volume equals 92.0 ccs.   The E/e'(lat) is 21, consistent with significant diastolic dysfunction.     Right Atrium: The right atrium is normal in size, measuring 4.1 cm in length and 3.4 cm in width in the apical view.     Right Ventricle: The right ventricle is normal in size measuring 3.7 cm at the base in the apical right ventricle-focused view. Global right ventricular systolic function appears normal. Tricuspid annular plane systolic excursion (TAPSE) is 2.5 cm.   Tissue Doppler-derived tricuspid annular peak systolic velocity (S prime) is 15.8 cm/s. The estimated PA systolic pressure is 16 mmHg.     Aortic Valve:  The aortic valve is moderately sclerotic. The peak gradient obtained across the aortic valve is 30.0 mmHg, with a mean gradient of 16.7 mmHg. Using a left  ventricular outflow tract diameter of 2.3 cm, a left ventricular outflow tract   velocity time integral of 23 cm, and a peak instantaneous transvalvular velocity time integral of 47 cm, the calculated aortic valve area is 1.95 cm2, consistent with mild aortic stenosis. Additionally, there is mild aortic regurgitation.     IVC: IVC is normal in size and collapses > 50% with a sniff, suggesting normal right atrial pressure of 3 mmHg.     Intracavitary: There is no evidence of pericardial effusion, intracavity mass, thrombi, or vegetation.         CONCLUSIONS     1 - Normal left ventricular systolic function (EF 55-60%).     2 - Concentric hypertrophy.     3 - Left ventricular diastolic dysfunction.     4 - Left atrial enlargement.     5 - Mild aortic stenosis, BASIL = 1.95 cm2, mean gradient = 16.7 mmHg.     6 - Mild aortic regurgitation.             This document has been electronically    SIGNED BY: Tyrone Steen MD On: 03/10/2017 12:04     Assessment and Plan:     * Bradycardia  EKG shows sinus bradycardia with Mobitz I AVB. Hold amiodarone and metoprolol. No urgent need for PPM. Consider PPM is he develops tachy-eve issues. Correct electrolytes    Chronic diastolic heart failure  Diuresis and afterload reduction as tolerated    Dyslipidemia  On statin    Essential hypertension  stable    Typical atrial flutter  Currently sinus bradycardia. Amiodarone and metoprolol held. Continue eliquis        VTE Risk Mitigation (From admission, onward)         Ordered     IP VTE HIGH RISK PATIENT  Once         06/14/23 2125     Place sequential compression device  Until discontinued         06/14/23 2125                Thank you for your consult. I will follow-up with patient. Please contact us if you have any additional questions.    Tyrone Steen MD  Cardiology   [facility]

## 2023-06-15 NOTE — ASSESSMENT & PLAN NOTE
Chronic HFpEF without exacerbation  Lasix on hold due to hypotension  Metoprolol on hold due to bradycardia.  Cardiology to follow-plan for PPM in the AM  Status post MARIA, 1/5/2023 with LVEF 65% normal wall motion, moderate AAS 1.2 cm².:

## 2023-06-15 NOTE — HOSPITAL COURSE
72-year-old male with history of congestive heart failure, CAD, diabetes, hyperlipidemia, hypertension, thyroid nodules, and osteoarthritis admitted on 06/14/2023 for further evaluation of weakness and lightheadedness, also with left knee pain. Pt hypotensive per EMS, improved with fluids.  Found to have bradycardia and hypokalemia.  K was 2.5 on admit. Electrolytes replaced as needed. CXR with no acute process.  Cardiology consulted. Pt also with recent steroid injection in left knee.  Low suspicion for septic joint at this time.  Orthopedic consulted.  Cardiology evaluated patient for bradycardia.  EKG shows sinus bradycardia with Mobitz I AVB.  No urgent need for PPM per Cardiology.  Amiodarone and Metoprolol stopped.  Ortho evaluated patient for knee pain.  He has already failed an injection to left knee approximately 3 weeks ago he does have diabetes and other medical problems and Ortho feels may be unreasonable to inject the knee again.  Plan for weight-bearing as tolerated as well as physical therapy and strengthening will see him back in Ortho clinics to continue to discuss a left knee replacement.  Slightly bradycardic, but asymptomatic.  No longer hypotensive.  Will stop Norvasc upon discharge.  He will continue Hydralazine.  Will decrease Lasix upon discharge to 40 mg bid.  He has remained afebrile and hemodynamically stable.  He will be discharged home with .  Recently refused SNF.  Patient with multiple recent ER visits and admissions.  Seems like having issues taking care of himself at home and may eventually need half-way placement.

## 2023-06-15 NOTE — ASSESSMENT & PLAN NOTE
EKG shows sinus bradycardia with Mobitz I AVB. Hold amiodarone and metoprolol. No urgent need for PPM. Consider PPM is he develops tachy-eve issues. Correct electrolytes

## 2023-06-15 NOTE — SUBJECTIVE & OBJECTIVE
Past Medical History:   Diagnosis Date    Acute congestive heart failure 3/20/2020    Alcohol abuse     Arthritis     Asthma attack 11/24/2021    Coronary artery disease     Diabetes mellitus     Hyperlipemia     Hypertension     Multiple thyroid nodules 6/14/2023    Rhabdomyolysis        Past Surgical History:   Procedure Laterality Date    EYE SURGERY      TRANSESOPHAGEAL ECHOCARDIOGRAM WITH POSSIBLE CARDIOVERSION (MARIA W/ POSS CARDIOVERSION) N/A 1/5/2023    Procedure: Transesophageal echo (MARIA) intra-procedure log documentation;  Surgeon: Indra Foote MD;  Location: [facility] CATH LAB;  Service: Cardiology;  Laterality: N/A;    TREATMENT OF CARDIAC ARRHYTHMIA N/A 12/7/2020    Procedure: Cardioversion or Defibrillation;  Surgeon: Indra Foote MD;  Location: [facility] CATH LAB;  Service: Cardiology;  Laterality: N/A;    TREATMENT OF CARDIAC ARRHYTHMIA N/A 12/30/2020    Procedure: Cardioversion or Defibrillation;  Surgeon: Tyrone Steen MD;  Location: [facility] CATH LAB;  Service: Cardiology;  Laterality: N/A;    TREATMENT OF CARDIAC ARRHYTHMIA N/A 1/5/2023    Procedure: Cardioversion or Defibrillation;  Surgeon: Indra Foote MD;  Location: [facility] CATH LAB;  Service: Cardiology;  Laterality: N/A;    VASCULAR SURGERY         Review of patient's allergies indicates:  No Known Allergies  12 point systems reviewed and negative except as mentioned in HPI section.      No current facility-administered medications on file prior to encounter.     Current Outpatient Medications on File Prior to Encounter   Medication Sig    amiodarone (PACERONE) 200 MG Tab Take 1 tablet (200 mg total) by mouth once daily.    amLODIPine (NORVASC) 10 MG tablet Take 10 mg by mouth Daily.    ELIQUIS 5 mg Tab Take 5 mg by mouth 2 (two) times daily.    furosemide (LASIX) 40 MG tablet Take 80 mg by mouth 2 (two) times daily.    losartan-hydrochlorothiazide 50-12.5 mg (HYZAAR) 50-12.5 mg per tablet Take 1 tablet by mouth once daily.     magnesium oxide (MAG-OX) 400 mg (241.3 mg magnesium) tablet Take 800 mg by mouth.    metoprolol succinate (TOPROL-XL) 25 MG 24 hr tablet Take 25 mg by mouth.    NOVOLIN 70/30 U-100 INSULIN 100 unit/mL (70-30) injection Inject 55 Units into the skin 2 (two) times daily.    potassium chloride (KLOR-CON) 10 MEQ TbSR Take 10 mEq by mouth once daily.    rosuvastatin (CRESTOR) 40 MG Tab Take 40 mg by mouth every evening.    tamsulosin (FLOMAX) 0.4 mg Cap Take 1 capsule (0.4 mg total) by mouth once daily.    albuterol (PROVENTIL/VENTOLIN HFA) 90 mcg/actuation inhaler Inhale 2 puffs into the lungs every 4 (four) hours as needed.    diclofenac sodium (VOLTAREN) 1 % Gel Apply 2 g topically 2 (two) times daily as needed (pain).    hydrALAZINE (APRESOLINE) 50 MG tablet Take 1.5 tablets (75 mg total) by mouth 3 (three) times daily.    insulin aspart U-100 (NOVOLOG) 100 unit/mL (3 mL) InPn pen Inject 5 Units into the skin 3 (three) times daily.    sildenafiL (VIAGRA) 100 MG tablet TAKE 1 TABLET BY MOUTH ONCE DAILY AS NEEDED FOR ERECTILE DYSFUNCTION.    [DISCONTINUED] multivitamin Tab Take 1 tablet by mouth once daily.    [DISCONTINUED] predniSONE (DELTASONE) 20 MG tablet Take 1.5 tablets (30 mg total) by mouth once daily for 2 days, THEN 1 tablet (20 mg total) once daily for 2 days, THEN 0.5 tablets (10 mg total) once daily for 2 days.     Family History       Problem Relation (Age of Onset)    Diabetes Mother, Father, Sister    Hypertension Mother, Father, Sister          Tobacco Use    Smoking status: Never    Smokeless tobacco: Never   Substance and Sexual Activity    Alcohol use: Not Currently     Alcohol/week: 6.0 standard drinks     Types: 6 Cans of beer per week     Comment: daily    Drug use: No    Sexual activity: Yes     Partners: Female     Review of Systems  Objective:     Vital Signs (Most Recent):  Temp: 97.3 °F (36.3 °C) (06/14/23 2044)  Pulse: (!) 47 (06/14/23 2044)  Resp: 18 (06/14/23 2044)  BP: (!) 162/70  (06/14/23 2044)  SpO2: 100 % (06/14/23 2044) Vital Signs (24h Range):  Temp:  [97.3 °F (36.3 °C)-98.3 °F (36.8 °C)] 97.3 °F (36.3 °C)  Pulse:  [41-64] 47  Resp:  [15-23] 18  SpO2:  [96 %-100 %] 100 %  BP: ()/() 162/70     Weight: 100.2 kg (221 lb)  Body mass index is 30.82 kg/m².     Physical Exam   General: Alert ,no apparent cardiorespiratory distress, laying comfortably  Eye: PERRL, normal conjunctiva  HEENT: Normocephalic, atraumatic, dry  oral mucosa  Neck: Supple.  Respiratory: Lungs CTA, equal breath sounds, symmetric expansion, no chest wall tenderness  Cardiovascular: Bradycardic, regular rhythm, no appreciable murmurs rubs or gallops, no peripheral edema, good pulses and equal in all extremities.  Gastrointestinal: Soft, non-tender, non-distended, normal bowel sounds,.  Genitourinary: Deferred  Musculoskeletal and Neurologic: Unable to participate with.  Exam secondary to ongoing knee pain, left knee in a brace.  No apparent focal neurological deficit observed.  Psychiatric: Appropriate mood and affect.          Significant Labs: All pertinent labs within the past 24 hours have been reviewed.  BMP:   Recent Labs   Lab 06/14/23  1604   *   *   K 2.5*   CL 99   CO2 23   *   CREATININE 3.6*   CALCIUM 8.0*   MG 1.6     CBC:   Recent Labs   Lab 06/14/23  1604   WBC 7.80   HGB 11.1*   HCT 32.3*          Significant Imaging: I have reviewed all pertinent imaging results/findings within the past 24 hours.  I have reviewed and interpreted all pertinent imaging results/findings within the past 24 hours.

## 2023-06-15 NOTE — H&P
[facility] Medicine  History & Physical    Patient Name: Chato Bowie  MRN: [id number]  Patient Class: IP- Inpatient  Admission Date: 6/14/2023  Attending Physician: Tiffanie Rojas DO   Primary Care Provider: [facility]         Patient information was obtained from patient and ER records.     Subjective:     Principal Problem:Bradycardia    Chief Complaint:   Chief Complaint   Patient presents with    Fatigue     EMS called to pt residence for a male that has had malaise and fatigue combined with lower back and bilateral knee pain x2 days. Pt denies any CP no SOB no nausea or vomiting and no trauma. EMS reports abnormal heart rhythm         HPI: Mr Bowie is a 72-year-old -American male who presents the emergency room complaining of generally not feeling well, ongoing left knee pain, and some lightheadedness and dizziness that has gradually worsened over the last 24 to 48 hours.  His medical history is significant for hypertension, atrial flutter/fib on anticoagulation with Eliquis, chronic HFpEF, type 2 diabetes mellitus, BPH, CKD 4, coronary disease, dyslipidemia, anemia of chronic disease, DJD and known thyroid nodules.  He was recently discharged from the hospital on 6/6/2023 during which time he was having notable left knee pain, had known previous left knee large effusion that was evaluated by orthopedic surgery, underwent steroid injection on 6/5/2023 with some relief and was able to be transitioned back to home with home health care.  He reports that he has not seen orthopedics after discharge as of yet.  He did suffer a previous fall about 3 weeks ago and at baseline, uses walker to get around to a certain degree.    On arrival in the ER, he was noted to have hypotension with blood pressure of 98/50, heart rate of 44 and saturating 98% on room air.  He is CBC appears grossly unremarkable except mild anemia which appears to be chronic.  His BMP reviewed that shows  hypokalemia with potassium of 2.5, creatinine of 3.6 with his baseline of about 2.6, elevated uric acid of 10.5.  His corrected calcium is 9.2.  His troponin is marginally elevated at 0.67 without active chest pains.  His chest x-ray does not demonstrate any acute findings. ER physician promptly discussed case with cardiologist on-call with recommendation of admission, close monitoring, his EKG may represent Wenke block, and replacing electrolytes and getting further cardiac work-up.  Is being admitted for further evaluation and treatment.      Past Medical History:   Diagnosis Date    Acute congestive heart failure 3/20/2020    Alcohol abuse     Arthritis     Asthma attack 11/24/2021    Coronary artery disease     Diabetes mellitus     Hyperlipemia     Hypertension     Multiple thyroid nodules 6/14/2023    Rhabdomyolysis        Past Surgical History:   Procedure Laterality Date    EYE SURGERY      TRANSESOPHAGEAL ECHOCARDIOGRAM WITH POSSIBLE CARDIOVERSION (MARIA W/ POSS CARDIOVERSION) N/A 1/5/2023    Procedure: Transesophageal echo (MARIA) intra-procedure log documentation;  Surgeon: Indra Foote MD;  Location: [facility] CATH LAB;  Service: Cardiology;  Laterality: N/A;    TREATMENT OF CARDIAC ARRHYTHMIA N/A 12/7/2020    Procedure: Cardioversion or Defibrillation;  Surgeon: Indra Foote MD;  Location: [facility] CATH LAB;  Service: Cardiology;  Laterality: N/A;    TREATMENT OF CARDIAC ARRHYTHMIA N/A 12/30/2020    Procedure: Cardioversion or Defibrillation;  Surgeon: Tyrone Steen MD;  Location: [facility] CATH LAB;  Service: Cardiology;  Laterality: N/A;    TREATMENT OF CARDIAC ARRHYTHMIA N/A 1/5/2023    Procedure: Cardioversion or Defibrillation;  Surgeon: Indra Foote MD;  Location: [facility] CATH LAB;  Service: Cardiology;  Laterality: N/A;    VASCULAR SURGERY         Review of patient's allergies indicates:  No Known Allergies  12 point systems reviewed and negative except as mentioned in HPI  section.      No current facility-administered medications on file prior to encounter.     Current Outpatient Medications on File Prior to Encounter   Medication Sig    amiodarone (PACERONE) 200 MG Tab Take 1 tablet (200 mg total) by mouth once daily.    amLODIPine (NORVASC) 10 MG tablet Take 10 mg by mouth Daily.    ELIQUIS 5 mg Tab Take 5 mg by mouth 2 (two) times daily.    furosemide (LASIX) 40 MG tablet Take 80 mg by mouth 2 (two) times daily.    losartan-hydrochlorothiazide 50-12.5 mg (HYZAAR) 50-12.5 mg per tablet Take 1 tablet by mouth once daily.    magnesium oxide (MAG-OX) 400 mg (241.3 mg magnesium) tablet Take 800 mg by mouth.    metoprolol succinate (TOPROL-XL) 25 MG 24 hr tablet Take 25 mg by mouth.    NOVOLIN 70/30 U-100 INSULIN 100 unit/mL (70-30) injection Inject 55 Units into the skin 2 (two) times daily.    potassium chloride (KLOR-CON) 10 MEQ TbSR Take 10 mEq by mouth once daily.    rosuvastatin (CRESTOR) 40 MG Tab Take 40 mg by mouth every evening.    tamsulosin (FLOMAX) 0.4 mg Cap Take 1 capsule (0.4 mg total) by mouth once daily.    albuterol (PROVENTIL/VENTOLIN HFA) 90 mcg/actuation inhaler Inhale 2 puffs into the lungs every 4 (four) hours as needed.    diclofenac sodium (VOLTAREN) 1 % Gel Apply 2 g topically 2 (two) times daily as needed (pain).    hydrALAZINE (APRESOLINE) 50 MG tablet Take 1.5 tablets (75 mg total) by mouth 3 (three) times daily.    insulin aspart U-100 (NOVOLOG) 100 unit/mL (3 mL) InPn pen Inject 5 Units into the skin 3 (three) times daily.    sildenafiL (VIAGRA) 100 MG tablet TAKE 1 TABLET BY MOUTH ONCE DAILY AS NEEDED FOR ERECTILE DYSFUNCTION.    [DISCONTINUED] multivitamin Tab Take 1 tablet by mouth once daily.    [DISCONTINUED] predniSONE (DELTASONE) 20 MG tablet Take 1.5 tablets (30 mg total) by mouth once daily for 2 days, THEN 1 tablet (20 mg total) once daily for 2 days, THEN 0.5 tablets (10 mg total) once daily for 2 days.     Family History        Problem Relation (Age of Onset)    Diabetes Mother, Father, Sister    Hypertension Mother, Father, Sister          Tobacco Use    Smoking status: Never    Smokeless tobacco: Never   Substance and Sexual Activity    Alcohol use: Not Currently     Alcohol/week: 6.0 standard drinks     Types: 6 Cans of beer per week     Comment: daily    Drug use: No    Sexual activity: Yes     Partners: Female     Review of Systems  Objective:     Vital Signs (Most Recent):  Temp: 97.3 °F (36.3 °C) (06/14/23 2044)  Pulse: (!) 47 (06/14/23 2044)  Resp: 18 (06/14/23 2044)  BP: (!) 162/70 (06/14/23 2044)  SpO2: 100 % (06/14/23 2044) Vital Signs (24h Range):  Temp:  [97.3 °F (36.3 °C)-98.3 °F (36.8 °C)] 97.3 °F (36.3 °C)  Pulse:  [41-64] 47  Resp:  [15-23] 18  SpO2:  [96 %-100 %] 100 %  BP: ()/() 162/70     Weight: 100.2 kg (221 lb)  Body mass index is 30.82 kg/m².     Physical Exam   General: Alert ,no apparent cardiorespiratory distress, laying comfortably  Eye: PERRL, normal conjunctiva  HEENT: Normocephalic, atraumatic, dry  oral mucosa  Neck: Supple.  Respiratory: Lungs CTA, equal breath sounds, symmetric expansion, no chest wall tenderness  Cardiovascular: Bradycardic, regular rhythm, no appreciable murmurs rubs or gallops, no peripheral edema, good pulses and equal in all extremities.  Gastrointestinal: Soft, non-tender, non-distended, normal bowel sounds,.  Genitourinary: Deferred  Musculoskeletal and Neurologic: Unable to participate with.  Exam secondary to ongoing knee pain, left knee in a brace.  No apparent focal neurological deficit observed.  Psychiatric: Appropriate mood and affect.          Significant Labs: All pertinent labs within the past 24 hours have been reviewed.  BMP:   Recent Labs   Lab 06/14/23  1604   *   *   K 2.5*   CL 99   CO2 23   *   CREATININE 3.6*   CALCIUM 8.0*   MG 1.6     CBC:   Recent Labs   Lab 06/14/23  1604   WBC 7.80   HGB 11.1*   HCT 32.3*           Significant Imaging: I have reviewed all pertinent imaging results/findings within the past 24 hours.  I have reviewed and interpreted all pertinent imaging results/findings within the past 24 hours.    Assessment/Plan:     * Bradycardia  Suspected AV dissociation.  ER physician has discussed case with cardiology who is aware about the case.  Hold AV iker blocking agents.  Check TFTs  Echocardiogram  Cardiology consult  Amio on hold for now.        Hypotension  Could be related to multiple medications. Antihypertensives on hold at this time.      Hypokalemia  Replaced, follow repeat in AM.      Essential hypertension  Amlodipine on hold due to hypotension  Losartan/HCTZ and metolazone were discontinued previous admission        Chronic diastolic heart failure  Chronic HFpEF without exacerbation  Lasix on hold due to hypotension  Metoprolol on hold due to bradycardia.  Cardiology to follow.  Status post MARIA, 1/5/2023 with LVEF 65% normal wall motion, moderate AAS 1.2 cm².:        Typical atrial flutter  Continue Eliquis      DM (diabetes mellitus) type II controlled with renal manifestation  Continue aspart 5 units 3 times daily  Sliding scale insulin with Accu-Chek monitoring  Decreased 70/30 to 25 units twice daily from 55 twice daily.      BPH (benign prostatic hyperplasia)  Flomax on hold due to hypotension      CKD (chronic kidney disease), stage IV  Avoid nephrotoxic agents      Left knee pain  Ortho and PT consulted.  Reported previous Calcium pyrophosphate crystals.  Patient reports previous intra-articular steroid injection helped.      Dyslipidemia  Continue statin      Anemia  Normocytic, could be from chronic disease.  We will defer to outpatient work-up        Multiple thyroid nodules  As noted on CT of 6/3/2023.  TFTs  Outpatient endocrine follow-up recommended.        DJD (degenerative joint disease)  As noted on CT of spine on 6/3/2023  PT evaluation.  Fall precautions        VTE Risk  Mitigation (From admission, onward)         Ordered     apixaban tablet 5 mg  2 times daily         06/14/23 2252     IP VTE HIGH RISK PATIENT  Once         06/14/23 2125     Place sequential compression device  Until discontinued         06/14/23 2125                           Edward Henao MD  Department of Hospital Medicine  [facility] - Telemetry

## 2023-06-15 NOTE — PROGRESS NOTES
[facility] Medicine  Progress Note    Patient Name: Chato Bowie  MRN: [id number]  Patient Class: IP- Inpatient   Admission Date: 6/14/2023  Length of Stay: 1 days  Attending Physician: Tiffanie Rojas DO  Primary Care Provider: Irlanda Valenzuela        Subjective:     Principal Problem:Bradycardia        HPI:  Mr Bowie is a 72-year-old -American male who presents the emergency room complaining of generally not feeling well, ongoing left knee pain, and some lightheadedness and dizziness that has gradually worsened over the last 24 to 48 hours.  His medical history is significant for hypertension, atrial flutter/fib on anticoagulation with Eliquis, chronic HFpEF, type 2 diabetes mellitus, BPH, CKD 4, coronary disease, dyslipidemia, anemia of chronic disease, DJD and known thyroid nodules.  He was recently discharged from the hospital on 6/6/2023 during which time he was having notable left knee pain, had known previous left knee large effusion that was evaluated by orthopedic surgery, underwent steroid injection on 6/5/2023 with some relief and was able to be transitioned back to home with home health care.  He reports that he has not seen orthopedics after discharge as of yet.  He did suffer a previous fall about 3 weeks ago and at baseline, uses walker to get around to a certain degree.    On arrival in the ER, he was noted to have hypotension with blood pressure of 98/50, heart rate of 44 and saturating 98% on room air.  He is CBC appears grossly unremarkable except mild anemia which appears to be chronic.  His BMP reviewed that shows hypokalemia with potassium of 2.5, creatinine of 3.6 with his baseline of about 2.6, elevated uric acid of 10.5.  His corrected calcium is 9.2.  His troponin is marginally elevated at 0.67 without active chest pains.  His chest x-ray does not demonstrate any acute findings. ER physician promptly discussed case with cardiologist on-call with  recommendation of admission, close monitoring, his EKG may represent Wenke block, and replacing electrolytes and getting further cardiac work-up.  Is being admitted for further evaluation and treatment.      Overview/Hospital Course:  72-year-old male with history of congestive heart failure, CAD, diabetes, hyperlipidemia, hypertension, thyroid nodules, and osteoarthritis admitted on 06/14/2023 for further evaluation of weakness and lightheadedness, also with left knee pain. Pt hypotensive per EMS, improved with fluids.  Found to have bradycardia and hypokalemia.  K was 2.5 on admit. Electrolytes replaced as needed. CXR with no acute process. EKG may represent Wenke block. Cardiology consulted. Pt also with recent steroid injection in left knee.  Low suspicion for septic joint at this time.  Orthopedic consulted.  We will continue to monitor      Interval History:  No acute overnight events.  Patient remained afebrile.  Continues to feel weak overall and feeling weak.  Complains of left knee pain, that is chronic.  Requesting for steroid injection again.    Review of Systems   Constitutional:  Positive for activity change and fatigue. Negative for chills and fever.   Eyes:  Negative for visual disturbance.   Respiratory:  Negative for cough, chest tightness and shortness of breath.    Cardiovascular:  Negative for chest pain, palpitations and leg swelling.   Gastrointestinal:  Negative for abdominal pain, nausea and vomiting.   Genitourinary:  Negative for difficulty urinating.   Musculoskeletal:  Positive for arthralgias (left knee pain), gait problem and joint swelling (left knee swelling). Negative for myalgias.   Neurological:  Positive for weakness. Negative for dizziness and headaches.   Psychiatric/Behavioral:  Negative for confusion and hallucinations.      Objective:     Vital Signs (Most Recent):  Temp: 97.5 °F (36.4 °C) (06/15/23 1136)  Pulse: 69 (06/15/23 1136)  Resp: 18 (06/15/23 1136)  BP: (!) 107/55  (06/15/23 1136)  SpO2: 98 % (06/15/23 1136) Vital Signs (24h Range):  Temp:  [97.3 °F (36.3 °C)-98.3 °F (36.8 °C)] 97.5 °F (36.4 °C)  Pulse:  [41-69] 69  Resp:  [15-23] 18  SpO2:  [96 %-100 %] 98 %  BP: ()/() 107/55     Weight: 93.9 kg (207 lb)  Body mass index is 28.87 kg/m².    Intake/Output Summary (Last 24 hours) at 6/15/2023 1233  Last data filed at 6/15/2023 0139  Gross per 24 hour   Intake --   Output 650 ml   Net -650 ml         Physical Exam  Vitals and nursing note reviewed.   Constitutional:       General: He is not in acute distress.     Appearance: He is obese. He is not ill-appearing.   HENT:      Head: Atraumatic.      Mouth/Throat:      Mouth: Mucous membranes are moist.   Eyes:      Extraocular Movements: Extraocular movements intact.   Cardiovascular:      Rate and Rhythm: Normal rate and regular rhythm.      Heart sounds: Murmur heard.     No gallop.   Pulmonary:      Effort: Pulmonary effort is normal. No respiratory distress.      Breath sounds: Normal breath sounds. No wheezing.   Abdominal:      General: There is no distension.      Palpations: Abdomen is soft.      Tenderness: There is no abdominal tenderness. There is no guarding.   Musculoskeletal:         General: Swelling and tenderness present.      Right lower leg: No edema.      Left lower leg: No edema.      Comments: Left knee with swelling, mild tenderness with palpation. ROM appears intact, but limited due to pain. Able to bear weight but causing pain    Skin:     General: Skin is warm and dry.   Neurological:      General: No focal deficit present.      Mental Status: He is alert and oriented to person, place, and time.   Psychiatric:         Mood and Affect: Mood normal.         Thought Content: Thought content normal.           Significant Labs: All pertinent labs within the past 24 hours have been reviewed.    Significant Imaging: I have reviewed all pertinent imaging results/findings within the past 24  hours.      Assessment/Plan:      * Bradycardia  -Presented with weakness, fatigue and lightheadedness. Hypotensive and bradycardic in the ED.   -Suspected AV dissociation.  -ER physician has discussed case with cardiology who is aware about the case.  -Hold AV iker blocking agents.  -Check TFTs: TSH 3.5  -UDS negative   -Echocardiogram with EF 60%, G2DD. Moderate AV stenosis   -Cardiology consulted: plan for PPM ib 06/16  -Amio on hold for now.        Hypotension  Could be related to multiple medications.   Antihypertensives on hold at this time.      Hypokalemia  -Replace as needed     Essential hypertension  Amlodipine on hold due to hypotension  Losartan/HCTZ and metolazone were discontinued previous admission        Typical atrial flutter  Cardiology consulted-plan for PPM in the AM  Hold home Eliquis        Dyslipidemia  Continue statin      Anemia  Normocytic, could be from chronic disease.  We will defer to outpatient work-up        CKD (chronic kidney disease), stage IV  Avoid nephrotoxic agents  Stable, near baseline     Chronic diastolic heart failure  Chronic HFpEF without exacerbation  Lasix on hold due to hypotension  Metoprolol on hold due to bradycardia.  Cardiology to follow-plan for PPM in the AM  Status post MARIA, 1/5/2023 with LVEF 65% normal wall motion, moderate AAS 1.2 cm².:        Left knee pain  Ortho and PT consulted.  Reported previous Calcium pyrophosphate crystals.  Patient reports previous intra-articular steroid injection helped.  Does not appear like septic joint  Suspect due to osteoarthritis  Continue pain control       DJD (degenerative joint disease)  As noted on CT of spine on 6/3/2023  PT evaluation.  Fall precautions        Multiple thyroid nodules  As noted on CT of 6/3/2023.  TSH 3.5  Outpatient endocrine follow-up recommended.        DM (diabetes mellitus) type II controlled with renal manifestation  A1c:   Lab Results   Component Value Date    HGBA1C 7.8 (H) 06/02/2023      Meds: SSI PRN to maintain goal 140-180  ADA diet, accuchecks ACHS, hypoglycemic protocol        BPH (benign prostatic hyperplasia)  Flomax on hold due to hypotension      Overweight (BMI 25.0-29.9)  Body mass index is 28.87 kg/m².  Weight loss as outpatient          VTE Risk Mitigation (From admission, onward)         Ordered     IP VTE HIGH RISK PATIENT  Once         06/14/23 2125     Place sequential compression device  Until discontinued         06/14/23 2125                Discharge Planning   ELY:      Code Status: Full Code   Is the patient medically ready for discharge?:     Reason for patient still in hospital (select all that apply): Patient trending condition, Laboratory test, Consult recommendations and PT / OT recommendations  Discharge Plan A: Home                  Tiffanie Rojas DO  Department of Hospital Medicine   [facility]

## 2023-06-15 NOTE — SUBJECTIVE & OBJECTIVE
Interval History:  No acute overnight events.  Patient remained afebrile.  Continues to feel weak overall and feeling weak.  Complains of left knee pain, that is chronic.  Requesting for steroid injection again.    Review of Systems   Constitutional:  Positive for activity change and fatigue. Negative for chills and fever.   Eyes:  Negative for visual disturbance.   Respiratory:  Negative for cough, chest tightness and shortness of breath.    Cardiovascular:  Negative for chest pain, palpitations and leg swelling.   Gastrointestinal:  Negative for abdominal pain, nausea and vomiting.   Genitourinary:  Negative for difficulty urinating.   Musculoskeletal:  Positive for arthralgias (left knee pain), gait problem and joint swelling (left knee swelling). Negative for myalgias.   Neurological:  Positive for weakness. Negative for dizziness and headaches.   Psychiatric/Behavioral:  Negative for confusion and hallucinations.      Objective:     Vital Signs (Most Recent):  Temp: 97.5 °F (36.4 °C) (06/15/23 1136)  Pulse: 69 (06/15/23 1136)  Resp: 18 (06/15/23 1136)  BP: (!) 107/55 (06/15/23 1136)  SpO2: 98 % (06/15/23 1136) Vital Signs (24h Range):  Temp:  [97.3 °F (36.3 °C)-98.3 °F (36.8 °C)] 97.5 °F (36.4 °C)  Pulse:  [41-69] 69  Resp:  [15-23] 18  SpO2:  [96 %-100 %] 98 %  BP: ()/() 107/55     Weight: 93.9 kg (207 lb)  Body mass index is 28.87 kg/m².    Intake/Output Summary (Last 24 hours) at 6/15/2023 1233  Last data filed at 6/15/2023 0139  Gross per 24 hour   Intake --   Output 650 ml   Net -650 ml         Physical Exam  Vitals and nursing note reviewed.   Constitutional:       General: He is not in acute distress.     Appearance: He is obese. He is not ill-appearing.   HENT:      Head: Atraumatic.      Mouth/Throat:      Mouth: Mucous membranes are moist.   Eyes:      Extraocular Movements: Extraocular movements intact.   Cardiovascular:      Rate and Rhythm: Normal rate and regular rhythm.      Heart  sounds: Murmur heard.     No gallop.   Pulmonary:      Effort: Pulmonary effort is normal. No respiratory distress.      Breath sounds: Normal breath sounds. No wheezing.   Abdominal:      General: There is no distension.      Palpations: Abdomen is soft.      Tenderness: There is no abdominal tenderness. There is no guarding.   Musculoskeletal:         General: Swelling and tenderness present.      Right lower leg: No edema.      Left lower leg: No edema.      Comments: Left knee with swelling, mild tenderness with palpation. ROM appears intact, but limited due to pain. Able to bear weight but causing pain    Skin:     General: Skin is warm and dry.   Neurological:      General: No focal deficit present.      Mental Status: He is alert and oriented to person, place, and time.   Psychiatric:         Mood and Affect: Mood normal.         Thought Content: Thought content normal.           Significant Labs: All pertinent labs within the past 24 hours have been reviewed.    Significant Imaging: I have reviewed all pertinent imaging results/findings within the past 24 hours.

## 2023-06-16 VITALS
RESPIRATION RATE: 17 BRPM | WEIGHT: 207.25 LBS | HEIGHT: 71 IN | HEART RATE: 46 BPM | TEMPERATURE: 98 F | DIASTOLIC BLOOD PRESSURE: 81 MMHG | OXYGEN SATURATION: 98 % | BODY MASS INDEX: 29.02 KG/M2 | SYSTOLIC BLOOD PRESSURE: 190 MMHG

## 2023-06-16 PROBLEM — E87.6 HYPOKALEMIA: Status: RESOLVED | Noted: 2019-02-14 | Resolved: 2023-06-16

## 2023-06-16 PROBLEM — I95.9 HYPOTENSION: Status: RESOLVED | Noted: 2023-06-14 | Resolved: 2023-06-16

## 2023-06-16 LAB
ANION GAP SERPL CALC-SCNC: 12 MMOL/L (ref 8–16)
BASOPHILS # BLD AUTO: 0.03 K/UL (ref 0–0.2)
BASOPHILS NFR BLD: 0.5 % (ref 0–1.9)
BUN SERPL-MCNC: 113 MG/DL (ref 8–23)
CALCIUM SERPL-MCNC: 9.2 MG/DL (ref 8.7–10.5)
CHLORIDE SERPL-SCNC: 94 MMOL/L (ref 95–110)
CO2 SERPL-SCNC: 27 MMOL/L (ref 23–29)
CREAT SERPL-MCNC: 3.1 MG/DL (ref 0.5–1.4)
DIFFERENTIAL METHOD: ABNORMAL
EOSINOPHIL # BLD AUTO: 0.1 K/UL (ref 0–0.5)
EOSINOPHIL NFR BLD: 2 % (ref 0–8)
ERYTHROCYTE [DISTWIDTH] IN BLOOD BY AUTOMATED COUNT: 12.7 % (ref 11.5–14.5)
EST. GFR  (NO RACE VARIABLE): 21 ML/MIN/1.73 M^2
GLUCOSE SERPL-MCNC: 216 MG/DL (ref 70–110)
HCT VFR BLD AUTO: 34.4 % (ref 40–54)
HGB BLD-MCNC: 11.6 G/DL (ref 14–18)
IMM GRANULOCYTES # BLD AUTO: 0.05 K/UL (ref 0–0.04)
IMM GRANULOCYTES NFR BLD AUTO: 0.8 % (ref 0–0.5)
LYMPHOCYTES # BLD AUTO: 0.7 K/UL (ref 1–4.8)
LYMPHOCYTES NFR BLD: 12.1 % (ref 18–48)
MAGNESIUM SERPL-MCNC: 1.9 MG/DL (ref 1.6–2.6)
MCH RBC QN AUTO: 27.7 PG (ref 27–31)
MCHC RBC AUTO-ENTMCNC: 33.7 G/DL (ref 32–36)
MCV RBC AUTO: 82 FL (ref 82–98)
MONOCYTES # BLD AUTO: 0.9 K/UL (ref 0.3–1)
MONOCYTES NFR BLD: 14.3 % (ref 4–15)
NEUTROPHILS # BLD AUTO: 4.2 K/UL (ref 1.8–7.7)
NEUTROPHILS NFR BLD: 70.3 % (ref 38–73)
NRBC BLD-RTO: 0 /100 WBC
PHOSPHATE SERPL-MCNC: 2.7 MG/DL (ref 2.7–4.5)
PLATELET # BLD AUTO: 302 K/UL (ref 150–450)
PMV BLD AUTO: 9 FL (ref 9.2–12.9)
POCT GLUCOSE: 203 MG/DL (ref 70–110)
POCT GLUCOSE: 297 MG/DL (ref 70–110)
POTASSIUM SERPL-SCNC: 3.5 MMOL/L (ref 3.5–5.1)
RBC # BLD AUTO: 4.19 M/UL (ref 4.6–6.2)
SODIUM SERPL-SCNC: 133 MMOL/L (ref 136–145)
WBC # BLD AUTO: 5.95 K/UL (ref 3.9–12.7)

## 2023-06-16 PROCEDURE — 83735 ASSAY OF MAGNESIUM: CPT | Performed by: INTERNAL MEDICINE

## 2023-06-16 PROCEDURE — 36415 COLL VENOUS BLD VENIPUNCTURE: CPT | Performed by: INTERNAL MEDICINE

## 2023-06-16 PROCEDURE — 25000003 PHARM REV CODE 250: Performed by: STUDENT IN AN ORGANIZED HEALTH CARE EDUCATION/TRAINING PROGRAM

## 2023-06-16 PROCEDURE — 85025 COMPLETE CBC W/AUTO DIFF WBC: CPT | Performed by: INTERNAL MEDICINE

## 2023-06-16 PROCEDURE — 84100 ASSAY OF PHOSPHORUS: CPT | Performed by: INTERNAL MEDICINE

## 2023-06-16 PROCEDURE — 80048 BASIC METABOLIC PNL TOTAL CA: CPT | Performed by: INTERNAL MEDICINE

## 2023-06-16 PROCEDURE — G0378 HOSPITAL OBSERVATION PER HR: HCPCS

## 2023-06-16 PROCEDURE — 25000003 PHARM REV CODE 250: Performed by: INTERNAL MEDICINE

## 2023-06-16 RX ORDER — FUROSEMIDE 40 MG/1
40 TABLET ORAL 2 TIMES DAILY
Status: ON HOLD
Start: 2023-06-16 | End: 2023-08-15 | Stop reason: HOSPADM

## 2023-06-16 RX ORDER — TRAMADOL HYDROCHLORIDE 50 MG/1
50 TABLET ORAL EVERY 12 HOURS PRN
Qty: 14 TABLET | Refills: 0 | Status: ON HOLD | OUTPATIENT
Start: 2023-06-16 | End: 2023-07-25 | Stop reason: HOSPADM

## 2023-06-16 RX ORDER — HYDRALAZINE HYDROCHLORIDE 25 MG/1
75 TABLET, FILM COATED ORAL 3 TIMES DAILY
Status: DISCONTINUED | OUTPATIENT
Start: 2023-06-16 | End: 2023-06-16 | Stop reason: HOSPADM

## 2023-06-16 RX ADMIN — ATORVASTATIN CALCIUM 40 MG: 40 TABLET, FILM COATED ORAL at 09:06

## 2023-06-16 RX ADMIN — INSULIN ASPART 2 UNITS: 100 INJECTION, SOLUTION INTRAVENOUS; SUBCUTANEOUS at 09:06

## 2023-06-16 RX ADMIN — APIXABAN 5 MG: 5 TABLET, FILM COATED ORAL at 09:06

## 2023-06-16 NOTE — CODE 44
"MEDICARE CONDITION 44    (Reference: Transmittal 200 of the Medicare Manual)    A Medicare "Inpatient Admission" may be changed to an "Outpatient" (includes  Outpatient Observation) status, if the following conditions exist:  The change in patient status from inpatient to outpatient is made prior to        discharge or release, while the patient is still a patient in the hospital.   The hospital has not submitted a claim for the inpatient admission.  A physician concurs with the Utilization Committee decision.   Physician concurrence with the Utilization Committee's decision is documented         in the patient's records.         IMPLEMENTATION OF CONDITION CODE 44    Inpatient status has been reviewed prior to discharge. Change to Outpatient Observation status, in accordance with Condition Code 44.     By entering this in the medical record, I verify that I have reviewed and concur with the findings of the Utilization Review Committee and the Utilization Review Physician, and that the identified Patient does not meet medical necessity for Inpatient status. This patient is appropriate for the downgrade in status because upon subsequent review, it was determined that the patient did not meet the medical necessity for inpatient care. Outpatient Observation is the identified level of care appropriate for the Patient, and all of the above conditions for this status change have been met.     Ronaldo Osborne MD  "

## 2023-06-16 NOTE — PLAN OF CARE
[facility] [place]    HOME HEALTH ORDERS  FACE TO FACE ENCOUNTER    Patient Name: Chato Bowie  YOB: 1950    PCP: Irlanda Valenzuela   PCP Address: [address] / [address] [address]  PCP Phone Number: [phone]  PCP Fax: [phone]       Encounter Date: 06/16/2023    Admit to Home Health    Diagnoses:  Active Hospital Problems    Diagnosis  POA    *Bradycardia [R00.1]  Yes    Left knee pain [M25.562]  Yes     Priority: 2     Overweight (BMI 25.0-29.9) [E66.3]  Yes    DJD (degenerative joint disease) [M19.90]  Yes    Multiple thyroid nodules [E04.2]  Yes    DM (diabetes mellitus) type II controlled with renal manifestation [E11.29]  Yes    Chronic diastolic heart failure [I50.32]  Yes    CKD (chronic kidney disease), stage IV [N18.4]  Yes    BPH (benign prostatic hyperplasia) [N40.0]  Yes    Essential hypertension [I10]  Yes    Dyslipidemia [E78.5]  Yes    Anemia [D64.9]  Yes    Typical atrial flutter [I48.3]  Yes      Resolved Hospital Problems    Diagnosis Date Resolved POA    Hypotension [I95.9] 06/16/2023 Yes    Hypokalemia [E87.6] 06/16/2023 Yes    CAD (coronary artery disease) [I25.10] 06/14/2023 Yes       No future appointments.   Follow-up Information       [facility] Follow up in 1 week(s).    Contact information:  [place] [id number] [phone]               Tyrone Steen MD Follow up in 1 week(s).    Specialty: Cardiology  Contact information:  [address] [facility]  [address]  [place] [id number] [phone]               Felix Guzman MD Follow up.    Specialty: Orthopedic Surgery  Contact information:  [address] [facility]  [address]  [place] [id number] [phone]                               I have seen and examined this patient face to face today. My clinical findings that support the need for the home health skilled services and home bound status are the following:  Medical restrictions requiring assistance of another human to leave home due  to  Decreased range of motions in extremities.    Allergies:Review of patient's allergies indicates:  No Known Allergies    Diet: cardiac diet and diabetic diet: 2000 calorie    Activities: activity as tolerated    Nursing:   SN to complete comprehensive assessment including routine vital signs. Instruct on disease process and s/s of complications to report to MD. Review/verify medication list sent home with the patient at time of discharge  and instruct patient/caregiver as needed. Frequency may be adjusted depending on start of care date.    Notify MD if SBP > 160 or < 90; DBP > 90 or < 50; HR > 120 or < 50; Temp > 101      CONSULTS:    Physical Therapy to evaluate and treat. Evaluate for home safety and equipment needs; Establish/upgrade home exercise program. Perform / instruct on therapeutic exercises, gait training, transfer training, and Range of Motion.  Occupational Therapy to evaluate and treat. Evaluate home environment for safety and equipment needs. Perform/Instruct on transfers, ADL training, ROM, and therapeutic exercises.   to evaluate for community resources/long-range planning.  Aide to provide assistance with personal care, ADLs, and vital signs.    MISCELLANEOUS CARE:  Heart Failure:      SN to instruct on the following:    Instruct on the definition of CHF.   Instruct on the signs/sympoms of CHF to be reported.   Instruct on and monitor daily weights.   Instruct on factors that cause exacerbation.   Instruct on action, dose, schedule, and side effects of medications.   Instruct on diet as prescribed.   Instruct on activity allowed.   Instruct on life-style modifications for life long management of CHF   SN to assess compliance with daily weights, diet, medications, fluid retention,    safety precautions, activities permitted and life-style modifications.   Additional 1-2 SN visits per week as needed for signs and symptoms     of CHF exacerbation.      Medications: Review discharge  medications with patient and family and provide education.      Current Discharge Medication List        START taking these medications    Details   traMADoL (ULTRAM) 50 mg tablet Take 1 tablet (50 mg total) by mouth every 12 (twelve) hours as needed for Pain.  Qty: 14 tablet, Refills: 0           CONTINUE these medications which have CHANGED    Details   furosemide (LASIX) 40 MG tablet Take 1 tablet (40 mg total) by mouth 2 (two) times daily.           CONTINUE these medications which have NOT CHANGED    Details   ELIQUIS 5 mg Tab Take 5 mg by mouth 2 (two) times daily.      magnesium oxide (MAG-OX) 400 mg (241.3 mg magnesium) tablet Take 800 mg by mouth.      NOVOLIN 70/30 U-100 INSULIN 100 unit/mL (70-30) injection Inject 55 Units into the skin 2 (two) times daily.      potassium chloride (KLOR-CON) 10 MEQ TbSR Take 10 mEq by mouth once daily.      rosuvastatin (CRESTOR) 40 MG Tab Take 40 mg by mouth every evening.      tamsulosin (FLOMAX) 0.4 mg Cap Take 1 capsule (0.4 mg total) by mouth once daily.  Qty: 30 capsule, Refills: 11      albuterol (PROVENTIL/VENTOLIN HFA) 90 mcg/actuation inhaler Inhale 2 puffs into the lungs every 4 (four) hours as needed.  Qty: 8.5 g, Refills: 5      diclofenac sodium (VOLTAREN) 1 % Gel Apply 2 g topically 2 (two) times daily as needed (pain).  Qty: 100 g, Refills: 0      hydrALAZINE (APRESOLINE) 50 MG tablet Take 1.5 tablets (75 mg total) by mouth 3 (three) times daily.  Qty: 90 tablet, Refills: 1    Comments: .      insulin aspart U-100 (NOVOLOG) 100 unit/mL (3 mL) InPn pen Inject 5 Units into the skin 3 (three) times daily.  Qty: 4.5 mL, Refills: 11      sildenafiL (VIAGRA) 100 MG tablet TAKE 1 TABLET BY MOUTH ONCE DAILY AS NEEDED FOR ERECTILE DYSFUNCTION.  Qty: 30 tablet, Refills: 0           STOP taking these medications       amiodarone (PACERONE) 200 MG Tab Comments:   Reason for Stopping:         amLODIPine (NORVASC) 10 MG tablet Comments:   Reason for Stopping:          losartan-hydrochlorothiazide 50-12.5 mg (HYZAAR) 50-12.5 mg per tablet Comments:   Reason for Stopping:         metoprolol succinate (TOPROL-XL) 25 MG 24 hr tablet Comments:   Reason for Stopping:               I certify that this patient is confined to his home and needs intermittent skilled nursing care, physical therapy, and occupational therapy.

## 2023-06-16 NOTE — NURSING
Bedside handoff received from nurse Beverly RN. Patient lying in bed without any acute distress noted at this time. Safety precautions maintained.

## 2023-06-16 NOTE — PLAN OF CARE
Problem: Adult Inpatient Plan of Care  Goal: Plan of Care Review  6/16/2023 1117 by Hannah Lopez RN  Outcome: Adequate for Care Transition  6/16/2023 0907 by Hannah Lopez RN  Outcome: Ongoing, Progressing  Goal: Patient-Specific Goal (Individualized)  6/16/2023 1117 by Hannah Lopez RN  Outcome: Adequate for Care Transition  6/16/2023 0907 by Hannah Lopez RN  Outcome: Ongoing, Progressing  Goal: Absence of Hospital-Acquired Illness or Injury  6/16/2023 1117 by Hannah Lopez RN  Outcome: Adequate for Care Transition  6/16/2023 0907 by Hannah Lopez RN  Outcome: Ongoing, Progressing  Goal: Optimal Comfort and Wellbeing  6/16/2023 1117 by Hannah Lopez RN  Outcome: Adequate for Care Transition  6/16/2023 0907 by Hannah Lopez RN  Outcome: Ongoing, Progressing  Goal: Readiness for Transition of Care  6/16/2023 1117 by Hannah Lopez RN  Outcome: Adequate for Care Transition  6/16/2023 0907 by Hannah Lopez RN  Outcome: Ongoing, Progressing     Problem: Diabetes Comorbidity  Goal: Blood Glucose Level Within Targeted Range  6/16/2023 1117 by Hannah Lopez RN  Outcome: Adequate for Care Transition  6/16/2023 0907 by Hannah Lopez RN  Outcome: Ongoing, Progressing     Problem: Adjustment to Illness (Sepsis/Septic Shock)  Goal: Optimal Coping  6/16/2023 1117 by Hannah Lopez RN  Outcome: Adequate for Care Transition  6/16/2023 0907 by Hannah Lopez RN  Outcome: Ongoing, Progressing     Problem: Bleeding (Sepsis/Septic Shock)  Goal: Absence of Bleeding  6/16/2023 1117 by Hannah Lopez RN  Outcome: Adequate for Care Transition  6/16/2023 0907 by Hannah Lopez RN  Outcome: Ongoing, Progressing     Problem: Glycemic Control Impaired (Sepsis/Septic Shock)  Goal: Blood Glucose Level Within Desired Range  6/16/2023 1117 by Hannah Lopez RN  Outcome: Adequate for Care Transition  6/16/2023 0907 by Hannah Lopez RN  Outcome: Ongoing, Progressing     Problem:  Infection Progression (Sepsis/Septic Shock)  Goal: Absence of Infection Signs and Symptoms  6/16/2023 1117 by Hannah Lopez RN  Outcome: Adequate for Care Transition  6/16/2023 0907 by Hannah Lopez RN  Outcome: Ongoing, Progressing     Problem: Nutrition Impaired (Sepsis/Septic Shock)  Goal: Optimal Nutrition Intake  6/16/2023 1117 by Hannah Lopez RN  Outcome: Adequate for Care Transition  6/16/2023 0907 by Hannah Lopez RN  Outcome: Ongoing, Progressing     Problem: Fluid and Electrolyte Imbalance (Acute Kidney Injury/Impairment)  Goal: Fluid and Electrolyte Balance  6/16/2023 1117 by Hannah Lopez RN  Outcome: Adequate for Care Transition  6/16/2023 0907 by Hannah Lopez RN  Outcome: Ongoing, Progressing     Problem: Oral Intake Inadequate (Acute Kidney Injury/Impairment)  Goal: Optimal Nutrition Intake  6/16/2023 1117 by Hannah Lopez RN  Outcome: Adequate for Care Transition  6/16/2023 0907 by Hannah Lopez RN  Outcome: Ongoing, Progressing     Problem: Renal Function Impairment (Acute Kidney Injury/Impairment)  Goal: Effective Renal Function  6/16/2023 1117 by Hannah Lopez RN  Outcome: Adequate for Care Transition  6/16/2023 0907 by Hannah Lopez RN  Outcome: Ongoing, Progressing     Problem: Skin Injury Risk Increased  Goal: Skin Health and Integrity  6/16/2023 1117 by Hannah Lopez RN  Outcome: Adequate for Care Transition  6/16/2023 0907 by Hannah Lopez RN  Outcome: Ongoing, Progressing     Problem: Dysrhythmia  Goal: Normalized Cardiac Rhythm  6/16/2023 1117 by Hannah Lopez RN  Outcome: Adequate for Care Transition  6/16/2023 0907 by Hannah Lopez RN  Outcome: Ongoing, Progressing

## 2023-06-16 NOTE — PLAN OF CARE
TN notified pt nurse that the pt is clear d/c-- appointments scheduled and HHCS arranged. Transportation will be requested for as soon as possible.    Orders sent to [facility] to advise of d/c for resumption of care

## 2023-06-16 NOTE — NURSING
[facility] MEWS TRIGGER FOLLOW UP       MEWS Monitoring, Score is:2  Indication for review: HR    Bedside Nurse, Nate howell MD aware/ following, instructed to call [phone] for further concerns or assistance.    Pt seen by cardiology today, home amio and metoprolol was d/c'd. Remains on cardiac monitor. BP stable. Care ongoing.

## 2023-06-16 NOTE — DISCHARGE SUMMARY
[facility] Medicine  Discharge Summary      Patient Name: Chato Bowie  MRN: [id number]  [facility]: [id number]  Patient Class: OP- Observation  Admission Date: 6/14/2023  Hospital Length of Stay: 1 days  Discharge Date and Time:  06/16/2023 9:29 AM  Attending Physician: Volodymyr Hunt MD   Discharging Provider: Volodymyr Hunt MD  Primary Care Provider: [facility]    Primary Care Team: Networked reference to record PCT     HPI:   Mr Bowie is a 72-year-old -American male who presents the emergency room complaining of generally not feeling well, ongoing left knee pain, and some lightheadedness and dizziness that has gradually worsened over the last 24 to 48 hours.  His medical history is significant for hypertension, atrial flutter/fib on anticoagulation with Eliquis, chronic HFpEF, type 2 diabetes mellitus, BPH, CKD 4, coronary disease, dyslipidemia, anemia of chronic disease, DJD and known thyroid nodules.  He was recently discharged from the hospital on 6/6/2023 during which time he was having notable left knee pain, had known previous left knee large effusion that was evaluated by orthopedic surgery, underwent steroid injection on 6/5/2023 with some relief and was able to be transitioned back to home with home health care.  He reports that he has not seen orthopedics after discharge as of yet.  He did suffer a previous fall about 3 weeks ago and at baseline, uses walker to get around to a certain degree.    On arrival in the ER, he was noted to have hypotension with blood pressure of 98/50, heart rate of 44 and saturating 98% on room air.  He is CBC appears grossly unremarkable except mild anemia which appears to be chronic.  His BMP reviewed that shows hypokalemia with potassium of 2.5, creatinine of 3.6 with his baseline of about 2.6, elevated uric acid of 10.5.  His corrected calcium is 9.2.  His troponin is marginally elevated at 0.67 without active chest pains.  His chest  x-ray does not demonstrate any acute findings. ER physician promptly discussed case with cardiologist on-call with recommendation of admission, close monitoring, his EKG may represent Wenke block, and replacing electrolytes and getting further cardiac work-up.  Is being admitted for further evaluation and treatment.      * No surgery found *      Hospital Course:   72-year-old male with history of congestive heart failure, CAD, diabetes, hyperlipidemia, hypertension, thyroid nodules, and osteoarthritis admitted on 06/14/2023 for further evaluation of weakness and lightheadedness, also with left knee pain. Pt hypotensive per EMS, improved with fluids.  Found to have bradycardia and hypokalemia.  K was 2.5 on admit. Electrolytes replaced as needed. CXR with no acute process.  Cardiology consulted. Pt also with recent steroid injection in left knee.  Low suspicion for septic joint at this time.  Orthopedic consulted.  Cardiology evaluated patient for bradycardia.  EKG shows sinus bradycardia with Mobitz I AVB.  No urgent need for PPM per Cardiology.  Amiodarone and Metoprolol stopped.  Ortho evaluated patient for knee pain.  He has already failed an injection to left knee approximately 3 weeks ago he does have diabetes and other medical problems and Ortho feels may be unreasonable to inject the knee again.  Plan for weight-bearing as tolerated as well as physical therapy and strengthening will see him back in Ortho clinics to continue to discuss a left knee replacement.  Slightly bradycardic, but asymptomatic.  No longer hypotensive.  Will stop Norvasc upon discharge.  He will continue Hydralazine.  Will decrease Lasix upon discharge to 40 mg bid.  He has remained afebrile and hemodynamically stable.  He will be discharged home with .  Recently refused SNF.  Patient with multiple recent ER visits and admissions.  Seems like having issues taking care of himself at home and may eventually need intermediate placement.        Goals of Care Treatment Preferences:  Code Status: Full Code      Consults:   Consults (From admission, onward)        Status Ordering Provider     Inpatient consult to Orthopedic Surgery  Once        Provider:  Felix Guzman MD    Completed SHAYNA ALMONTE     Inpatient consult to Cardiology  Once        Provider:  Tyrone Steen MD    Completed DAVID GUILLEN.          No new Assessment & Plan notes have been filed under this hospital service since the last note was generated.  Service: Hospital Medicine    Final Active Diagnoses:    Diagnosis Date Noted POA    PRINCIPAL PROBLEM:  Bradycardia [R00.1] 01/05/2023 Yes    Left knee pain [M25.562] 03/23/2020 Yes    Overweight (BMI 25.0-29.9) [E66.3] 06/15/2023 Yes    DJD (degenerative joint disease) [M19.90] 06/14/2023 Yes    Multiple thyroid nodules [E04.2] 06/14/2023 Yes    DM (diabetes mellitus) type II controlled with renal manifestation [E11.29] 05/12/2022 Yes    Chronic diastolic heart failure [I50.32] 05/12/2022 Yes    CKD (chronic kidney disease), stage IV [N18.4] 03/23/2021 Yes    BPH (benign prostatic hyperplasia) [N40.0] 11/24/2020 Yes    Essential hypertension [I10] 03/10/2017 Yes    Dyslipidemia [E78.5] 03/10/2017 Yes    Anemia [D64.9] 03/10/2017 Yes    Typical atrial flutter [I48.3] 03/07/2016 Yes      Problems Resolved During this Admission:    Diagnosis Date Noted Date Resolved POA    Hypotension [I95.9] 06/14/2023 06/16/2023 Yes    Hypokalemia [E87.6] 02/14/2019 06/16/2023 Yes    CAD (coronary artery disease) [I25.10] 03/08/2016 06/14/2023 Yes       Discharged Condition: stable    [place] [facility] [place]:   Follow-up Information     [facility] Follow up in 1 week(s).    Contact information:  [address]  [place] [address]  [phone]             Tyrone Steen MD Follow up in 1 week(s).    Specialty: Cardiology  Contact information:  [address]  [address]  [place]  [address]  [phone]             Felix Guzman MD Follow up.    Specialty: Orthopedic Surgery  Contact information:  [place] [address]  [phone]                       Patient Instructions:      Ambulatory referral/consult to Orthopedics   Standing Status: Future   Referral Priority: Routine Referral Type: Consultation   Requested Specialty: Orthopedic Surgery   Number of Visits Requested: 1     Diet Cardiac     Diet renal     Notify your health care provider if you experience any of the following:  temperature >100.4     Notify your health care provider if you experience any of the following:  persistent nausea and vomiting or diarrhea     Notify your health care provider if you experience any of the following:  difficulty breathing or increased cough     Notify your health care provider if you experience any of the following:  persistent dizziness, light-headedness, or visual disturbances     Notify your health care provider if you experience any of the following:  increased confusion or weakness     Activity as tolerated           Pending Diagnostic Studies:     Procedure Component Value Units Date/Time    EKG 12-lead [[id number]]     Order Status: Sent Lab Status: No result          Medications:  Reconciled Home Medications:      Medication List      START taking these medications    traMADoL 50 mg tablet  Commonly known as: ULTRAM  Take 1 tablet (50 mg total) by mouth every 12 (twelve) hours as needed for Pain.        CHANGE how you take these medications    furosemide 40 MG tablet  Commonly known as: LASIX  Take 1 tablet (40 mg total) by mouth 2 (two) times daily.  What changed: how much to take        CONTINUE taking these medications    albuterol 90 mcg/actuation inhaler  Commonly known as: PROVENTIL/VENTOLIN HFA  Inhale 2 puffs into the lungs every 4 (four) hours as needed.     diclofenac sodium 1 % Gel  Commonly known as: VOLTAREN  Apply 2 g topically 2 (two) times daily  as needed (pain).     ELIQUIS 5 mg Tab  Generic drug: apixaban  Take 5 mg by mouth 2 (two) times daily.     hydrALAZINE 50 MG tablet  Commonly known as: APRESOLINE  Take 1.5 tablets (75 mg total) by mouth 3 (three) times daily.     insulin aspart U-100 100 unit/mL (3 mL) Inpn pen  Commonly known as: NovoLOG  Inject 5 Units into the skin 3 (three) times daily.     magnesium oxide 400 mg (241.3 mg magnesium) tablet  Commonly known as: MAG-OX  Take 800 mg by mouth.     NovoLIN 70/30 U-100 Insulin 100 unit/mL (70-30) injection  Generic drug: insulin NPH-insulin regular (70/30)  Inject 55 Units into the skin 2 (two) times daily.     potassium chloride 10 MEQ Tbsr  Commonly known as: KLOR-CON  Take 10 mEq by mouth once daily.     rosuvastatin 40 MG Tab  Commonly known as: CRESTOR  Take 40 mg by mouth every evening.     sildenafiL 100 MG tablet  Commonly known as: VIAGRA  TAKE 1 TABLET BY MOUTH ONCE DAILY AS NEEDED FOR ERECTILE DYSFUNCTION.     tamsulosin 0.4 mg Cap  Commonly known as: FLOMAX  Take 1 capsule (0.4 mg total) by mouth once daily.        STOP taking these medications    amiodarone 200 MG Tab  Commonly known as: PACERONE     amLODIPine 10 MG tablet  Commonly known as: NORVASC     losartan-hydrochlorothiazide 50-12.5 mg 50-12.5 mg per tablet  Commonly known as: HYZAAR     metoprolol succinate 25 MG 24 hr tablet  Commonly known as: TOPROL-XL            Indwelling Lines/Drains at time of discharge:   Lines/Drains/Airways     None                 Time spent on the discharge of patient: >30 minutes         Volodymyr Hunt MD  Department of Hospital Medicine  [facility] [place] [facility]

## 2023-06-19 LAB — FUNGUS SPEC CULT: NORMAL

## 2023-06-23 ENCOUNTER — DOCUMENT SCAN (OUTPATIENT)
Dept: HOME HEALTH SERVICES | Facility: HOSPITAL | Age: 73
End: 2023-06-23
Payer: MEDICARE

## 2023-06-26 ENCOUNTER — HOSPITAL ENCOUNTER (EMERGENCY)
Facility: HOSPITAL | Age: 73
Discharge: HOME | End: 2023-06-26
Attending: EMERGENCY MEDICINE
Payer: MEDICARE

## 2023-06-26 VITALS
SYSTOLIC BLOOD PRESSURE: 169 MMHG | TEMPERATURE: 98 F | BODY MASS INDEX: 29.01 KG/M2 | DIASTOLIC BLOOD PRESSURE: 72 MMHG | WEIGHT: 208 LBS | OXYGEN SATURATION: 100 % | RESPIRATION RATE: 16 BRPM | HEART RATE: 54 BPM

## 2023-06-26 DIAGNOSIS — G89.29 CHRONIC PAIN OF LEFT KNEE: ICD-10-CM

## 2023-06-26 DIAGNOSIS — M25.562 CHRONIC PAIN OF LEFT KNEE: ICD-10-CM

## 2023-06-26 DIAGNOSIS — N18.4 CKD (CHRONIC KIDNEY DISEASE), STAGE IV: ICD-10-CM

## 2023-06-26 DIAGNOSIS — R10.9 FLANK PAIN: ICD-10-CM

## 2023-06-26 DIAGNOSIS — D69.6 THROMBOCYTOPENIA: Primary | ICD-10-CM

## 2023-06-26 LAB
ALBUMIN SERPL BCP-MCNC: 3.2 G/DL (ref 3.5–5.2)
ALP SERPL-CCNC: 75 U/L (ref 55–135)
ALT SERPL W/O P-5'-P-CCNC: 19 U/L (ref 10–44)
ANION GAP SERPL CALC-SCNC: 11 MMOL/L (ref 8–16)
AST SERPL-CCNC: 17 U/L (ref 10–40)
BACTERIA #/AREA URNS HPF: ABNORMAL /HPF
BASOPHILS # BLD AUTO: 0.04 K/UL (ref 0–0.2)
BASOPHILS NFR BLD: 0.9 % (ref 0–1.9)
BILIRUB SERPL-MCNC: 0.8 MG/DL (ref 0.1–1)
BILIRUB UR QL STRIP: NEGATIVE
BUN SERPL-MCNC: 69 MG/DL (ref 8–23)
CALCIUM SERPL-MCNC: 9.2 MG/DL (ref 8.7–10.5)
CHLORIDE SERPL-SCNC: 94 MMOL/L (ref 95–110)
CLARITY UR: CLEAR
CO2 SERPL-SCNC: 30 MMOL/L (ref 23–29)
COLOR UR: YELLOW
CREAT SERPL-MCNC: 2.7 MG/DL (ref 0.5–1.4)
DIFFERENTIAL METHOD: ABNORMAL
EOSINOPHIL # BLD AUTO: 0 K/UL (ref 0–0.5)
EOSINOPHIL NFR BLD: 0.6 % (ref 0–8)
ERYTHROCYTE [DISTWIDTH] IN BLOOD BY AUTOMATED COUNT: 12.6 % (ref 11.5–14.5)
EST. GFR  (NO RACE VARIABLE): 24 ML/MIN/1.73 M^2
GLUCOSE SERPL-MCNC: 112 MG/DL (ref 70–110)
GLUCOSE UR QL STRIP: NEGATIVE
HCT VFR BLD AUTO: 35 % (ref 40–54)
HGB BLD-MCNC: 11.8 G/DL (ref 14–18)
HGB UR QL STRIP: NEGATIVE
HYALINE CASTS #/AREA URNS LPF: 8 /LPF
IMM GRANULOCYTES # BLD AUTO: 0.04 K/UL (ref 0–0.04)
IMM GRANULOCYTES NFR BLD AUTO: 0.9 % (ref 0–0.5)
KETONES UR QL STRIP: NEGATIVE
LEUKOCYTE ESTERASE UR QL STRIP: NEGATIVE
LYMPHOCYTES # BLD AUTO: 0.4 K/UL (ref 1–4.8)
LYMPHOCYTES NFR BLD: 8.5 % (ref 18–48)
MCH RBC QN AUTO: 27.1 PG (ref 27–31)
MCHC RBC AUTO-ENTMCNC: 33.7 G/DL (ref 32–36)
MCV RBC AUTO: 80 FL (ref 82–98)
MICROSCOPIC COMMENT: ABNORMAL
MONOCYTES # BLD AUTO: 0.8 K/UL (ref 0.3–1)
MONOCYTES NFR BLD: 16.5 % (ref 4–15)
NEUTROPHILS # BLD AUTO: 3.4 K/UL (ref 1.8–7.7)
NEUTROPHILS NFR BLD: 72.6 % (ref 38–73)
NITRITE UR QL STRIP: NEGATIVE
NRBC BLD-RTO: 0 /100 WBC
PH UR STRIP: 6 [PH] (ref 5–8)
PLATELET # BLD AUTO: 128 K/UL (ref 150–450)
PMV BLD AUTO: 9.1 FL (ref 9.2–12.9)
POTASSIUM SERPL-SCNC: 3 MMOL/L (ref 3.5–5.1)
PROT SERPL-MCNC: 6.5 G/DL (ref 6–8.4)
PROT UR QL STRIP: ABNORMAL
RBC # BLD AUTO: 4.36 M/UL (ref 4.6–6.2)
RBC #/AREA URNS HPF: 12 /HPF (ref 0–4)
SODIUM SERPL-SCNC: 135 MMOL/L (ref 136–145)
SP GR UR STRIP: 1.01 (ref 1–1.03)
SQUAMOUS #/AREA URNS HPF: 0 /HPF
URN SPEC COLLECT METH UR: ABNORMAL
UROBILINOGEN UR STRIP-ACNC: NEGATIVE EU/DL
WBC # BLD AUTO: 4.68 K/UL (ref 3.9–12.7)
WBC #/AREA URNS HPF: 0 /HPF (ref 0–5)

## 2023-06-26 PROCEDURE — 96376 TX/PRO/DX INJ SAME DRUG ADON: CPT

## 2023-06-26 PROCEDURE — 96361 HYDRATE IV INFUSION ADD-ON: CPT

## 2023-06-26 PROCEDURE — 85025 COMPLETE CBC W/AUTO DIFF WBC: CPT | Performed by: EMERGENCY MEDICINE

## 2023-06-26 PROCEDURE — 96375 TX/PRO/DX INJ NEW DRUG ADDON: CPT

## 2023-06-26 PROCEDURE — 96374 THER/PROPH/DIAG INJ IV PUSH: CPT

## 2023-06-26 PROCEDURE — 81000 URINALYSIS NONAUTO W/SCOPE: CPT | Performed by: EMERGENCY MEDICINE

## 2023-06-26 PROCEDURE — 63600175 PHARM REV CODE 636 W HCPCS: Performed by: EMERGENCY MEDICINE

## 2023-06-26 PROCEDURE — 25000003 PHARM REV CODE 250: Performed by: EMERGENCY MEDICINE

## 2023-06-26 PROCEDURE — 99285 EMERGENCY DEPT VISIT HI MDM: CPT | Mod: 25

## 2023-06-26 PROCEDURE — 80053 COMPREHEN METABOLIC PANEL: CPT | Performed by: EMERGENCY MEDICINE

## 2023-06-26 RX ORDER — HYDROMORPHONE HYDROCHLORIDE 1 MG/ML
1 INJECTION, SOLUTION INTRAMUSCULAR; INTRAVENOUS; SUBCUTANEOUS
Status: COMPLETED | OUTPATIENT
Start: 2023-06-26 | End: 2023-06-26

## 2023-06-26 RX ORDER — ONDANSETRON 2 MG/ML
4 INJECTION INTRAMUSCULAR; INTRAVENOUS
Status: COMPLETED | OUTPATIENT
Start: 2023-06-26 | End: 2023-06-26

## 2023-06-26 RX ORDER — DICLOFENAC SODIUM 10 MG/G
2 GEL TOPICAL 2 TIMES DAILY PRN
Qty: 100 G | Refills: 0 | Status: ON HOLD | OUTPATIENT
Start: 2023-06-26 | End: 2023-08-09 | Stop reason: HOSPADM

## 2023-06-26 RX ADMIN — POTASSIUM BICARBONATE 50 MEQ: 977.5 TABLET, EFFERVESCENT ORAL at 06:06

## 2023-06-26 RX ADMIN — ONDANSETRON 4 MG: 2 INJECTION INTRAMUSCULAR; INTRAVENOUS at 02:06

## 2023-06-26 RX ADMIN — ONDANSETRON 4 MG: 2 INJECTION INTRAMUSCULAR; INTRAVENOUS at 07:06

## 2023-06-26 RX ADMIN — SODIUM CHLORIDE 1000 ML: 9 INJECTION, SOLUTION INTRAVENOUS at 02:06

## 2023-06-26 RX ADMIN — HYDROMORPHONE HYDROCHLORIDE 1 MG: 1 INJECTION, SOLUTION INTRAMUSCULAR; INTRAVENOUS; SUBCUTANEOUS at 02:06

## 2023-06-26 NOTE — ED PROVIDER NOTES
SCRIBE #1 NOTE: I, Kristine Maradiaga, am scribing for, and in the presence of,  Mariano Barragan MD. I have scribed the following portions of the note - Other sections scribed: HPI, ROS, PE.         EM PHYSICIAN NOTE       This patient presents with a complaint of   Chief Complaint   Patient presents with    Flank Pain     Pt c/o right flank pain x3 weeks that has worsened in past 3 days. Pt fell and injured his back 3 weeks ago. Pt also c/o dysuria x a few months and states he's also been having dark colored urine. Pt also c/o right sided abdominal pain x3 days. Rates pain 10/10. Pt took Tylenol at 0900 today with no relief.    Dysuria       HPI: 72 y.o. male with PMHx of DM type 2, PAF, HTN, Alcohol abuse, NSTEMI, CHF (EF 60%), CKD 4, and others who presents to the ED via EMS for chief complaint of worsening right flank pain, right sided abdominal pain, and intermittent chest pain. Patient says his chest pain began first 3 weeks ago with his flank pain and abdominal pain beginning soon after. His chest pain is described as a sticking sensation occurring intermittently. Today his chest pain began while taking his insulin. He has been having flank pain for 3 weeks and felt generally malaised yesterday, but came in today due to increased pain. Patient was recently admitted 3 separate times on 6/1, 6/2, and 6/14. He has been taking Tramadol as prescribed with no relief. Denies any vomiting, diarrhea, constipation, or fever. He denies any prior abdominal surgeries. He is on Eliquis. Patient is amenable to social work consult and says that he needs help at home.    Review of patient's allergies indicates:  No Known Allergies    Preferred pharmacy: [facility], [place]        Pertinent REVIEW of SYSTEMS  Source: Patient  The nurse's notes and triage vital signs were reviewed.  GENERAL/CONSTITUTIONAL: There is not a report of fever   CARDIOVASCULAR: See HPI  RESPIRATORY: There is not a report of cough or  SOB  GASTROINTESTINAL: There is not a report of  vomiting, diarrhea. SEE HPI  HEMATOLOGIC/LYMPHATIC:  Patient takes Eliquis         PHYSICAL EXAMINATION    ED Triage Vitals [06/26/23 1249]   Enc Vitals Group      BP (!) 123/52      Pulse (!) 55      Resp 18      Temp 98.4 °F (36.9 °C)      Temp Source Oral      SpO2 100 %      Weight 208 lb      Height       Head Circumference       Peak Flow       Pain Score       Pain Loc       Pain Edu?       Excl. in GC?      Vital signs and Pulse Ox reviewed in clinical context. Abnormalities noted:  Hypertension  Pt's level of consciousness is Awake and Alert, and the patient is in mild distress.  Skin: warm, pink and dry.  Capillary refill is less than 2 seconds.  Mucosa: dry  Head and Neck: no JVD, neck supple  Cardiac exam: RRR , moderate systolic murmur heard greatest over R sternal border   Pulmonary exam: unlabored and clear  Abd Exam: soft, tenderness to palpation of RLQ, R middle quadrant, R flank.  No rebound or guarding.  Musculoskeletal: no joint tenderness, deformity or swelling, no swelling to BLE, 2+ pulses  Neurologic: GCS 15; moving all extremities equally, no facial droop       Medical decision making:   Nurses notes and Vital Signs reviewed.    72 y.o. male with PMHx of DM type 2, PAF, HTN, Alcohol abuse, NSTEMI, CHF (EF 60%), CKD 4, and others who presents to the ED via EMS for chief complaint of worsening right flank pain, right sided abdominal pain, and intermittent chest pain.      Problems: Today's visit reveals abdominal pain which is a/an Acute problem that is concerning for deterioration due to a differential diagnosis that includes renal stone, appendicitis.     Other problems today include no support at home     MDM Components integrated into this visit: Social determinants of health impacting care today: Compliance with follow-up or PCP impaired due to poor access to care and Access to PCP impaired because patient was unable to obtain appointment  with PCP in a timely manner    Considerations: My decision to discharge home this patient is based on normal workup and plan to refer to home health.          See ER course below for lab test ordered, results reviewed, independent interpretation of images or EKG, discussion with consultants, data obtained from sources other than patient:  ED Course as of 06/26/23 1925 Mon Jun 26, 2023   1613 CMP reveals a sodium of 135.  Potassium of 3.0.  Bicarb of 30.  BUN of 69 and a creatinine of 2.7.  On chart review of labs obtained outside of the ED visit, the creatinine elevation is chronic []   1614 CBC reveals mild anemia of 11 and 35.  White count is normal.  There is a thrombocytopenia with platelets of a [id number].  Chart review of labs performed outside of the ED visit revealed that the thrombocytopenia is new and the anemia is chronic []   1616 CT No findings to suggest obstructive uropathy.  2. Cholelithiasis/sludge, no secondary findings to suggest acute cholecystitis.  3. Mild urinary bladder distension, correlation with any history of outlet obstruction or urinary retention.  4. Prominent right inguinal lymph nodes, nonspecific. []      ED Course User Index  [MH] Mariano Barragan MD         Orders Placed This Encounter   Procedures    CT Renal Stone Study ABD Pelvis WO    Comprehensive Metabolic Panel    CBC Auto Differential    Urinalysis    Urinalysis Microscopic    Ambulatory referral/consult to Home Health    Ambulatory referral/consult to Family Practice    Nursing communication    Inpatient consult to Social Work         Diagnoses that have been ruled out:   None   Diagnoses that are still under consideration:   None   Final diagnoses:   Thrombocytopenia   Flank pain   Chronic pain of left knee   CKD (chronic kidney disease), stage IV               Referral for follow-up  Follow-up Information       Follow up With Specialties Details Why Contact Info    Call for appointment  In 3 days For Follow-up  and Recheck Call for Primary Care Clinic follow-up this week.  [phone]            Prescription management:  ED Prescriptions       Medication Sig Dispense Start Date End Date Auth. Provider    diclofenac sodium (VOLTAREN) 1 % Gel Apply 2 g topically 2 (two) times daily as needed (pain). 100 g 6/26/2023 7/6/2023 MD Mariano Elliott      This note was created using Dictation Software.  This program may occasionally misinterpret certain words and phrases.      SCRIBE ATTESTATION NOTE:   I attest that I personally performed the services documented by the scribe and acknowledged and confirm the content of the note.   Nurses notes were reviewed.  Mariano Barragan MD  06/26/23 1925

## 2023-06-27 ENCOUNTER — SSC ENCOUNTER (OUTPATIENT)
Dept: ADMINISTRATIVE | Facility: OTHER | Age: 73
End: 2023-06-27
Payer: MEDICARE

## 2023-06-27 NOTE — DISCHARGE INSTRUCTIONS
Future Appointments   Date Time Provider Department Center   7/19/2023 10:15 AM Tyrone Steen MD [facility]ARD[facility] [place]

## 2023-06-27 NOTE — PROGRESS NOTES
Please note the following patient's information was forwarded to [facility] for case management and/or .    Please contact Ext. [phone] with any questions.    Thank you,    Kiara Clark, [facility]  Outpatient Case Mgmnt  [phone]

## 2023-06-27 NOTE — PLAN OF CARE
Consult addressed. SW was consulted to assist patient with Home Health services. SW met with patient at his bedside. Patient stated that he is currently receiving HH services through [facility] and would like to resume.     SW contacted [facility] to inform them that patient is being discharge and would like to resume services. No answer SW left a message.

## 2023-06-27 NOTE — ED TRIAGE NOTES
Dysuria  Flank Pain (Pt c/o right flank pain x3 weeks that has worsened in past 3 days. Pt fell and injured his back 3 weeks ago. Pt also c/o dysuria x a few months and states he's also been having dark colored urine. Pt also c/o right sided abdominal pain x3 days. Rates pain 10/10. Pt took Tylenol at 0900 today with no relief.)

## 2023-06-27 NOTE — ED NOTES
Patient sitting up in bed at this time, resting. Respirations are even and non labored. Skin is warm and dry. No distress noted. Cardiac monitor with continuous pulse oximeter and automated blood pressure have already been connected. Call light in reach. Patient has been provided with cup of water for PO challenge and has been updated on his plan of care.

## 2023-06-29 ENCOUNTER — EXTERNAL HOME HEALTH (OUTPATIENT)
Dept: HOME HEALTH SERVICES | Facility: HOSPITAL | Age: 73
End: 2023-06-29
Payer: MEDICARE

## 2023-07-05 ENCOUNTER — PES CALL (OUTPATIENT)
Dept: ADMINISTRATIVE | Facility: CLINIC | Age: 73
End: 2023-07-05
Payer: MEDICARE

## 2023-07-05 ENCOUNTER — HOSPITAL ENCOUNTER (EMERGENCY)
Facility: HOSPITAL | Age: 73
Discharge: HOME | End: 2023-07-05
Attending: EMERGENCY MEDICINE
Payer: MEDICARE

## 2023-07-05 VITALS
HEIGHT: 71 IN | BODY MASS INDEX: 29.82 KG/M2 | RESPIRATION RATE: 20 BRPM | OXYGEN SATURATION: 98 % | HEART RATE: 79 BPM | WEIGHT: 213 LBS | SYSTOLIC BLOOD PRESSURE: 138 MMHG | TEMPERATURE: 98 F | DIASTOLIC BLOOD PRESSURE: 79 MMHG

## 2023-07-05 DIAGNOSIS — M25.562 LEFT KNEE PAIN: ICD-10-CM

## 2023-07-05 DIAGNOSIS — R07.9 CHEST PAIN: ICD-10-CM

## 2023-07-05 LAB
ALBUMIN SERPL BCP-MCNC: 3.3 G/DL (ref 3.5–5.2)
ALP SERPL-CCNC: 89 U/L (ref 55–135)
ALT SERPL W/O P-5'-P-CCNC: 32 U/L (ref 10–44)
ANION GAP SERPL CALC-SCNC: 18 MMOL/L (ref 8–16)
AST SERPL-CCNC: 17 U/L (ref 10–40)
BACTERIA #/AREA URNS HPF: ABNORMAL /HPF
BASOPHILS # BLD AUTO: 0.02 K/UL (ref 0–0.2)
BASOPHILS NFR BLD: 0.2 % (ref 0–1.9)
BILIRUB SERPL-MCNC: 1.6 MG/DL (ref 0.1–1)
BILIRUB UR QL STRIP: NEGATIVE
BNP SERPL-MCNC: 352 PG/ML (ref 0–99)
BUN SERPL-MCNC: 65 MG/DL (ref 8–23)
CALCIUM SERPL-MCNC: 10.2 MG/DL (ref 8.7–10.5)
CHLORIDE SERPL-SCNC: 92 MMOL/L (ref 95–110)
CLARITY UR: CLEAR
CO2 SERPL-SCNC: 27 MMOL/L (ref 23–29)
COLOR UR: YELLOW
CREAT SERPL-MCNC: 2.8 MG/DL (ref 0.5–1.4)
DIFFERENTIAL METHOD: ABNORMAL
EOSINOPHIL # BLD AUTO: 0 K/UL (ref 0–0.5)
EOSINOPHIL NFR BLD: 0 % (ref 0–8)
ERYTHROCYTE [DISTWIDTH] IN BLOOD BY AUTOMATED COUNT: 13.2 % (ref 11.5–14.5)
EST. GFR  (NO RACE VARIABLE): 23 ML/MIN/1.73 M^2
GLUCOSE SERPL-MCNC: 115 MG/DL (ref 70–110)
GLUCOSE UR QL STRIP: NEGATIVE
HCT VFR BLD AUTO: 37.4 % (ref 40–54)
HGB BLD-MCNC: 12.5 G/DL (ref 14–18)
HGB UR QL STRIP: ABNORMAL
HYALINE CASTS #/AREA URNS LPF: 3 /LPF
IMM GRANULOCYTES # BLD AUTO: 0.05 K/UL (ref 0–0.04)
IMM GRANULOCYTES NFR BLD AUTO: 0.5 % (ref 0–0.5)
KETONES UR QL STRIP: NEGATIVE
LEUKOCYTE ESTERASE UR QL STRIP: NEGATIVE
LYMPHOCYTES # BLD AUTO: 0.7 K/UL (ref 1–4.8)
LYMPHOCYTES NFR BLD: 7.1 % (ref 18–48)
MCH RBC QN AUTO: 27.5 PG (ref 27–31)
MCHC RBC AUTO-ENTMCNC: 33.4 G/DL (ref 32–36)
MCV RBC AUTO: 82 FL (ref 82–98)
MICROSCOPIC COMMENT: ABNORMAL
MONOCYTES # BLD AUTO: 1.2 K/UL (ref 0.3–1)
MONOCYTES NFR BLD: 13.5 % (ref 4–15)
NEUTROPHILS # BLD AUTO: 7.2 K/UL (ref 1.8–7.7)
NEUTROPHILS NFR BLD: 78.7 % (ref 38–73)
NITRITE UR QL STRIP: NEGATIVE
NRBC BLD-RTO: 0 /100 WBC
PH UR STRIP: 6 [PH] (ref 5–8)
PLATELET # BLD AUTO: 194 K/UL (ref 150–450)
PMV BLD AUTO: 8.8 FL (ref 9.2–12.9)
POTASSIUM SERPL-SCNC: 4.1 MMOL/L (ref 3.5–5.1)
PROT SERPL-MCNC: 7.9 G/DL (ref 6–8.4)
PROT UR QL STRIP: ABNORMAL
RBC # BLD AUTO: 4.55 M/UL (ref 4.6–6.2)
RBC #/AREA URNS HPF: 3 /HPF (ref 0–4)
SODIUM SERPL-SCNC: 137 MMOL/L (ref 136–145)
SP GR UR STRIP: 1.01 (ref 1–1.03)
SQUAMOUS #/AREA URNS HPF: 2 /HPF
TROPONIN I SERPL DL<=0.01 NG/ML-MCNC: 0.05 NG/ML (ref 0–0.03)
TROPONIN I SERPL DL<=0.01 NG/ML-MCNC: 0.06 NG/ML (ref 0–0.03)
URN SPEC COLLECT METH UR: ABNORMAL
UROBILINOGEN UR STRIP-ACNC: NEGATIVE EU/DL
WBC # BLD AUTO: 9.14 K/UL (ref 3.9–12.7)
WBC #/AREA URNS HPF: 4 /HPF (ref 0–5)
WBC CLUMPS URNS QL MICRO: ABNORMAL

## 2023-07-05 PROCEDURE — 83880 ASSAY OF NATRIURETIC PEPTIDE: CPT | Performed by: EMERGENCY MEDICINE

## 2023-07-05 PROCEDURE — 93010 ELECTROCARDIOGRAM REPORT: CPT | Mod: ,,, | Performed by: INTERNAL MEDICINE

## 2023-07-05 PROCEDURE — 85025 COMPLETE CBC W/AUTO DIFF WBC: CPT | Performed by: EMERGENCY MEDICINE

## 2023-07-05 PROCEDURE — 81000 URINALYSIS NONAUTO W/SCOPE: CPT | Performed by: EMERGENCY MEDICINE

## 2023-07-05 PROCEDURE — 96375 TX/PRO/DX INJ NEW DRUG ADDON: CPT

## 2023-07-05 PROCEDURE — 93005 ELECTROCARDIOGRAM TRACING: CPT

## 2023-07-05 PROCEDURE — 93010 EKG 12-LEAD: ICD-10-PCS | Mod: ,,, | Performed by: INTERNAL MEDICINE

## 2023-07-05 PROCEDURE — 80053 COMPREHEN METABOLIC PANEL: CPT | Performed by: EMERGENCY MEDICINE

## 2023-07-05 PROCEDURE — 84484 ASSAY OF TROPONIN QUANT: CPT | Mod: 91 | Performed by: EMERGENCY MEDICINE

## 2023-07-05 PROCEDURE — 63600175 PHARM REV CODE 636 W HCPCS: Performed by: EMERGENCY MEDICINE

## 2023-07-05 PROCEDURE — 96374 THER/PROPH/DIAG INJ IV PUSH: CPT

## 2023-07-05 PROCEDURE — 99285 EMERGENCY DEPT VISIT HI MDM: CPT | Mod: 25

## 2023-07-05 RX ORDER — OXYCODONE AND ACETAMINOPHEN 5; 325 MG/1; MG/1
1 TABLET ORAL EVERY 6 HOURS PRN
Qty: 12 TABLET | Refills: 0 | Status: SHIPPED | OUTPATIENT
Start: 2023-07-05 | End: 2023-07-08

## 2023-07-05 RX ORDER — METHYLPREDNISOLONE SOD SUCC 125 MG
125 VIAL (EA) INJECTION
Status: COMPLETED | OUTPATIENT
Start: 2023-07-05 | End: 2023-07-05

## 2023-07-05 RX ORDER — PREDNISONE 20 MG/1
60 TABLET ORAL DAILY
Qty: 10 TABLET | Refills: 0 | Status: SHIPPED | OUTPATIENT
Start: 2023-07-05 | End: 2023-07-10

## 2023-07-05 RX ORDER — MORPHINE SULFATE 4 MG/ML
4 INJECTION, SOLUTION INTRAMUSCULAR; INTRAVENOUS
Status: COMPLETED | OUTPATIENT
Start: 2023-07-05 | End: 2023-07-05

## 2023-07-05 RX ADMIN — METHYLPREDNISOLONE SODIUM SUCCINATE 125 MG: 125 INJECTION, POWDER, FOR SOLUTION INTRAMUSCULAR; INTRAVENOUS at 11:07

## 2023-07-05 RX ADMIN — MORPHINE SULFATE 4 MG: 4 INJECTION INTRAVENOUS at 09:07

## 2023-07-05 NOTE — ED PROVIDER NOTES
Encounter Date: 7/5/2023       History     Chief Complaint   Patient presents with    Chest Pain    Knee Pain     Pt c/o mid sternal chest pain and left knee pain x 5 weeks. Pt states states chest pain is intermittent and non radiating, accompanied by one episode of vomitus this morning. Pt denies falls or trauma to affected knee and states he was seen at this facility two weeks ago for the same symptoms - was d/c on pain meds. Pt denies SOB/diarrhea     72-year-old male with history of chest pain and chronic knee pain presenting with chest pain and knee pain.  Reports pain is nonradiating, associated 1 episode of vomiting this morning.  Denies shortness of breath.  Patient notes knee pain has been chronic.  He denies seeing any outpatient orthopedics about it.  Denies taking pain medication.  Denies new trauma fall or injury.  Previously in usual state of health    Review of patient's allergies indicates:  No Known Allergies  Past Medical History:   Diagnosis Date    Acute congestive heart failure 3/20/2020    Alcohol abuse     Arthritis     Asthma attack 11/24/2021    Coronary artery disease     Diabetes mellitus     Hyperlipemia     Hypertension     Multiple thyroid nodules 6/14/2023    Rhabdomyolysis      Past Surgical History:   Procedure Laterality Date    EYE SURGERY      TRANSESOPHAGEAL ECHOCARDIOGRAM WITH POSSIBLE CARDIOVERSION (MARIA W/ POSS CARDIOVERSION) N/A 1/5/2023    Procedure: Transesophageal echo (MARIA) intra-procedure log documentation;  Surgeon: Indra Foote MD;  Location: [facility] CATH LAB;  Service: Cardiology;  Laterality: N/A;    TREATMENT OF CARDIAC ARRHYTHMIA N/A 12/7/2020    Procedure: Cardioversion or Defibrillation;  Surgeon: Indra Foote MD;  Location: [facility] CATH LAB;  Service: Cardiology;  Laterality: N/A;    TREATMENT OF CARDIAC ARRHYTHMIA N/A 12/30/2020    Procedure: Cardioversion or Defibrillation;  Surgeon: Tyrone Steen MD;  Location: [facility] CATH LAB;  Service: Cardiology;   [facility] Dermatology Note  Encounter Date: Mar 31, 2023  Office Visit     Dermatology Problem List:  1. 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  - Lab workup: CBC, EBV DNA PCR, Treponemia Abs w reflex to RPR and titer, HHV6 DNA Quant PCR 3/31/2023   - triamcinolone 0.1% cream  ____________________________________________    Assessment & Plan:    # Morbilliform eruption. New, undiagnosed problem with uncertain prognosis.   ddx viral exanthem, secondary syphilis, less likely medication reaction.  Discussed punch biopsy for definitive diagnosis, but patient declines today due to concerns about scarring from procedure. Given rash is improving, recommended lab work-up as below including repeat treponemal antibody and adjuvant use of topical steroids. If rash not improving, patient will contact clinic to return PRN for re-evaluation and consider biopsy at that point.  - Lab workup: CBC, EBV DNA PCR, Treponemia Abs w reflex to RPR and titer, HHV6 DNA Quant PCR  - Start triamcinolone 0.1% cream BID prn    Procedures Performed:   None    Follow-up: prn for new or changing lesions    Staff and Scribe:     Scribe Disclosure:  I, Frankie Sumner, am serving as a scribe to document services personally performed by Teja Ward MD based on data collection and the provider's statements to me.     Provider Disclosure:   The documentation recorded by the scribe accurately reflects the services I personally performed and the decisions made by me.    Teja Ward MD    Department of Dermatology  [facility]: Phone: [phone], Fax:[phone]  [facility]: Phone: [phone] Fax: [phone]  ____________________________________________    CC: Rash (Pt reports a rash on arms, chest, torso, and back. It has improved, but it is not  Laterality: N/A;    TREATMENT OF CARDIAC ARRHYTHMIA N/A 1/5/2023    Procedure: Cardioversion or Defibrillation;  Surgeon: Indra Foote MD;  Location: [facility] CATH LAB;  Service: Cardiology;  Laterality: N/A;    VASCULAR SURGERY       Family History   Problem Relation Age of Onset    Hypertension Mother     Diabetes Mother     Diabetes Father     Hypertension Father     Diabetes Sister     Hypertension Sister      Social History     Tobacco Use    Smoking status: Never    Smokeless tobacco: Never   Substance Use Topics    Alcohol use: Not Currently     Alcohol/week: 6.0 standard drinks     Types: 6 Cans of beer per week     Comment: daily    Drug use: No     Review of Systems   Constitutional:  Negative for chills and fever.   HENT:  Negative for sore throat.    Respiratory:  Negative for shortness of breath.    Cardiovascular:  Positive for chest pain.   Gastrointestinal:  Negative for nausea and vomiting.   Genitourinary:  Negative for dysuria.   Musculoskeletal:  Positive for arthralgias and joint swelling. Negative for back pain.   Skin:  Negative for rash.   Neurological:  Negative for weakness.     Physical Exam     Initial Vitals [07/05/23 0746]   BP Pulse Resp Temp SpO2   (!) 168/71 85 17 98.3 °F (36.8 °C) 100 %      MAP       --         Physical Exam    Constitutional: He appears well-developed and well-nourished. He is not diaphoretic. No distress.   HENT:   Head: Normocephalic and atraumatic.   Eyes: Conjunctivae and EOM are normal. Pupils are equal, round, and reactive to light. No scleral icterus.   Neck: Neck supple.   Normal range of motion.  Cardiovascular:  Normal rate, regular rhythm, normal heart sounds and intact distal pulses.     Exam reveals no gallop and no friction rub.       No murmur heard.  Pulmonary/Chest: Breath sounds normal. No stridor. No respiratory distress. He has no wheezes. He has no rhonchi. He has no rales.   Abdominal: Abdomen is soft. Bowel sounds are normal. He exhibits  completely gone.) and Derm Problem (Pt would also like some spots on upper body looked at.)    HPI:  Mr. Iker Berrios is a(n) 29 year old male who presents today as a new patient for rash. Patient was seen by Herrera Miranda NP, on 3/24/2023, and he was started on acyclovir for treatment of pityriasis rosacea.    Today, patient reports rash which started on left arm then spread down the arm and across the chest, neck, back, and face. Denies associated itch. Denies recent illnesses or recent medication changes. Sexually active with men only.    Patient is otherwise feeling well, without additional skin concerns.    Labs Reviewed:  N/A    Physical Exam:  Vitals: There were no vitals taken for this visit.  SKIN: Focused examination of trunk and upper extremities was performed.  - Numerous small pink macules and papules coalescing into thin patches and plaques on the trunk, upper extremities including palms.  - No other lesions of concern on areas examined.     Medications:  Current Outpatient Medications   Medication     acyclovir (ZOVIRAX) 400 MG tablet     cyclobenzaprine (FLEXERIL) 5 MG tablet     gabapentin (NEURONTIN) 100 MG capsule     tretinoin (RETIN-A) 0.01 % external gel     TRIUMEQ 600- MG per tablet     No current facility-administered medications for this visit.      Past Medical History:   Patient Active Problem List   Diagnosis     Human immunodeficiency virus I infection (H)     History of depression     Gonorrhea     Recurrent streptococcal tonsillitis     Past Medical History:   Diagnosis Date     Human immunodeficiency virus I infection (H) 06/19/2018    Likely infected 3/18.  On antiretroviral therapy with Triumeq since 7/9/18.  Pre-treatment HIV viral load 2,654 copies/ml.  Scott CD4+ cell count 763.        CC Pinky Broussard MD  [address] [facility] [place] [address] on close of this encounter.   no distension. There is no abdominal tenderness. There is no rebound and no guarding.   Musculoskeletal:         General: Tenderness and edema present. Normal range of motion.      Cervical back: Normal range of motion and neck supple.      Comments: Chronic edema tenderness and swelling and effusion of left knee.  Somewhat painful range of motion     Neurological: He is alert and oriented to person, place, and time. He has normal strength. No cranial nerve deficit.   Skin: Skin is warm and dry. No rash noted.   Psychiatric: He has a normal mood and affect. His behavior is normal.       ED Course   Procedures  Labs Reviewed   CBC W/ AUTO DIFFERENTIAL - Abnormal; Notable for the following components:       Result Value    RBC 4.55 (*)     Hemoglobin 12.5 (*)     Hematocrit 37.4 (*)     MPV 8.8 (*)     Immature Grans (Abs) 0.05 (*)     Lymph # 0.7 (*)     Mono # 1.2 (*)     Gran % 78.7 (*)     Lymph % 7.1 (*)     All other components within normal limits   COMPREHENSIVE METABOLIC PANEL - Abnormal; Notable for the following components:    Chloride 92 (*)     Glucose 115 (*)     BUN 65 (*)     Creatinine 2.8 (*)     Albumin 3.3 (*)     Total Bilirubin 1.6 (*)     eGFR 23 (*)     Anion Gap 18 (*)     All other components within normal limits   TROPONIN I - Abnormal; Notable for the following components:    Troponin I 0.058 (*)     All other components within normal limits   B-TYPE NATRIURETIC PEPTIDE - Abnormal; Notable for the following components:     (*)     All other components within normal limits   URINALYSIS, REFLEX TO URINE CULTURE - Abnormal; Notable for the following components:    Protein, UA 2+ (*)     Occult Blood UA 2+ (*)     All other components within normal limits    Narrative:     Specimen Source->Urine   TROPONIN I - Abnormal; Notable for the following components:    Troponin I 0.052 (*)     All other components within normal limits   URINALYSIS MICROSCOPIC - Abnormal; Notable for the following  "components:    Bacteria Few (*)     Hyaline Casts, UA 3 (*)     All other components within normal limits    Narrative:     Specimen Source->Urine     EKG Readings: (Independently Interpreted)   Initial Reading: No STEMI. Rhythm: Normal Sinus Rhythm. Heart Rate: 71. Conduction: LBBB and 1st Degree AV Block.   No ST segment elevation or depression concerning for acute ischemia.        Imaging Results              X-Ray Knee 3 View Left (Final result)  Result time 07/05/23 09:36:25      Final result by Martin Thorpe MD (07/05/23 09:36:25)                   Impression:      See above      Electronically signed by: Martin Thorpe MD  Date:    07/05/2023  Time:    09:36               Narrative:    EXAMINATION:  XR KNEE 3 VIEW LEFT    CLINICAL HISTORY:  Pain in left knee    TECHNIQUE:  AP, lateral, and Merchant views of the left knee were performed.    COMPARISON:  N 06/15/2023 one    FINDINGS:  DJD and bone infarcts in the distal femur and tibia identified.  The joint spaces are narrowed more on the medial side.  No acute fracture or dislocation.  No bone destruction identified.  Vascular calcifications identified .                                       X-Ray Chest AP Portable (Final result)  Result time 07/05/23 08:46:20      Final result by Gee Sood MD (07/05/23 08:46:20)                   Impression:      No detrimental change when compared with 06/14/2023.      Electronically signed by: Gee Sood MD  Date:    07/05/2023  Time:    08:46               Narrative:    EXAMINATION:  XR CHEST AP PORTABLE    CLINICAL HISTORY:  Provided history is "  Chest pain, unspecified".    TECHNIQUE:  One view of the chest.    COMPARISON:  06/14/2023.    FINDINGS:  Cardiomediastinal silhouette is mildly enlarged and similar to the prior study.  Coarsened bibasilar interstitial lung markings but no confluent area of consolidation.  No sizable pleural effusion.  No pneumothorax.                                     "   Medications   morphine injection 4 mg (4 mg Intravenous Given 7/5/23 0268)   methylPREDNISolone sodium succinate injection 125 mg (125 mg Intravenous Given 7/5/23 1123)     Medical Decision Making:   Initial Assessment:   72 year old patient with hx of chronic left knee pain presenting secondary to chest pain and knee pain. Troponins, Cxr, ekg, and labs ordered which showed trop at baseline and negative x 2.    Also considered but less likely:     PE: normal rate, no sob/recent immovilization/surgery/travel/family history  Pneumonia: chest xray negative. No fever or leukoscytosis. No cough and lungs non consistent with pna  Tamponade: unlikely due to chest xray and ekg  STEMI: No STEMI on ekg  Dissection: equal pulses bilaterally and no ripping chest pain to the back  Esophageal rupture: no dysphagia or vomiting and chest xray negative for mediastinal air  Arrhythmia: no arrhythmia on ekg  CHF: no fluid overload on Cxr and physical exam  Pneumothorax: bilateral breath sounds and no signs of pneumothorax on chest xray     Vitals with mild hypertension.  Knee with chronic effusion and pain.  Possible gout versus pseudogout?  Orthopedics has tried to tap in the past unsuccessfully.  Will not re-attempted today.  Patient given Solu-Medrol and prescription for prednisone.  Patient states that he does not have resources at home.  I have reviewed the case with case management who has reviewed his chart.  The patient is supposed to have home health and PT OT visiting him at home.  He has been referred to orthopedist for a left knee replacement.  Is unclear to me why he is not yet gone.  Patient given prescription for prednisone and analgesia.  Patient reports pain improved.  I have arranged a definitive scheduled appointment with Orthopedics to discuss knee replacement.  Believe he is otherwise safe for discharge home at this time.  Discussed return precautions.                        Clinical Impression:   Final  diagnoses:  [R07.9] Chest pain  [M25.562] Left knee pain        ED Disposition Condition    Discharge Stable          ED Prescriptions       Medication Sig Dispense Start Date End Date Auth. Provider    predniSONE (DELTASONE) 20 MG tablet Take 3 tablets (60 mg total) by mouth once daily. for 5 days 10 tablet 7/5/2023 7/10/2023 Chan Alvarez MD    oxyCODONE-acetaminophen (PERCOCET) 5-325 mg per tablet Take 1 tablet by mouth every 6 (six) hours as needed for Pain. 12 tablet 7/5/2023 7/8/2023 Chan Alvarez MD          Follow-up Information       Follow up With Specialties Details Why Contact Info    Bran Helton MD Orthopedic Surgery On 7/14/2023 at 0900am [address] [facility] [address]  [phone]               Chan Alvarez MD  07/05/23 3504

## 2023-07-05 NOTE — DISCHARGE INSTRUCTIONS
You were seen in the emergency department for knee pain.  We have given you pain medication and steroids in your symptoms improved.  Continue to take the steroids at home.  We have given you prescription for pain medication.  This medication may make you sleepy. Please do not drink, drive, make important decisions, operate heavy machinery, or supervise children by yourself while on this medication.    You have an appointment with Orthopedics on July 14th at 9 in the morning.  Please keep this appointment and discuss a potential knee replacement.

## 2023-07-05 NOTE — ED NOTES
Chato Bowie, a 72 y.o. male presents to the ED w/ complaint of mid sternal chest pain onset this AM with x1 episode of emesis. Patient also reports left knee pain x5 weeks with no relief from OTC medication. Hx of arthritis. Pt denies recent trauma or falls .     Triage note:  Chief Complaint   Patient presents with    Chest Pain    Knee Pain     Pt c/o mid sternal chest pain and left knee pain x 5 weeks. Pt states states chest pain is intermittent and non radiating, accompanied by one episode of vomitus this morning. Pt denies falls or trauma to affected knee and states he was seen at this facility two weeks ago for the same symptoms - was d/c on pain meds. Pt denies SOB/diarrhea     Review of patient's allergies indicates:  No Known Allergies  Past Medical History:   Diagnosis Date    Acute congestive heart failure 3/20/2020    Alcohol abuse     Arthritis     Asthma attack 11/24/2021    Coronary artery disease     Diabetes mellitus     Hyperlipemia     Hypertension     Multiple thyroid nodules 6/14/2023    Rhabdomyolysis

## 2023-07-05 NOTE — CONSULTS
LINCOLN f/u with patient to confirm home health.  Patient has home health.  He also has a RW.  Patient is open to followup with ortho--just needs address.  Patient stated that he needs help at home.  SW encouraged patient to reach out to his nieces who normally assist him with routine errands and also prepares meals from time to time.  LINCOLN verified patient's contact information.

## 2023-07-06 ENCOUNTER — TELEPHONE (OUTPATIENT)
Dept: HOME HEALTH SERVICES | Facility: CLINIC | Age: 73
End: 2023-07-06
Payer: MEDICARE

## 2023-07-06 NOTE — TELEPHONE ENCOUNTER
Referral received for ED avoidance program to schedule an in home NP visit.  Patient contacted and NP visit scheduled for 7/10/23 with Shai Dey.  Ordering provider notified.

## 2023-07-07 ENCOUNTER — OFFICE VISIT (OUTPATIENT)
Dept: HOME HEALTH SERVICES | Facility: CLINIC | Age: 73
End: 2023-07-07
Payer: MEDICARE

## 2023-07-07 DIAGNOSIS — M25.562 LEFT KNEE PAIN: ICD-10-CM

## 2023-07-07 PROCEDURE — 3074F PR MOST RECENT SYSTOLIC BLOOD PRESSURE < 130 MM HG: ICD-10-PCS | Mod: CPTII,S$GLB,, | Performed by: NURSE PRACTITIONER

## 2023-07-07 PROCEDURE — 99349 HOME/RES VST EST MOD MDM 40: CPT | Mod: S$GLB,,, | Performed by: NURSE PRACTITIONER

## 2023-07-07 PROCEDURE — 1126F AMNT PAIN NOTED NONE PRSNT: CPT | Mod: CPTII,S$GLB,, | Performed by: NURSE PRACTITIONER

## 2023-07-07 PROCEDURE — 3051F PR MOST RECENT HEMOGLOBIN A1C LEVEL 7.0 - < 8.0%: ICD-10-PCS | Mod: CPTII,S$GLB,, | Performed by: NURSE PRACTITIONER

## 2023-07-07 PROCEDURE — 99349 PR HOME VISIT,ESTAB PATIENT,LEVEL III: ICD-10-PCS | Mod: S$GLB,,, | Performed by: NURSE PRACTITIONER

## 2023-07-07 PROCEDURE — 3078F PR MOST RECENT DIASTOLIC BLOOD PRESSURE < 80 MM HG: ICD-10-PCS | Mod: CPTII,S$GLB,, | Performed by: NURSE PRACTITIONER

## 2023-07-07 PROCEDURE — 3051F HG A1C>EQUAL 7.0%<8.0%: CPT | Mod: CPTII,S$GLB,, | Performed by: NURSE PRACTITIONER

## 2023-07-07 PROCEDURE — 1111F DSCHRG MED/CURRENT MED MERGE: CPT | Mod: CPTII,S$GLB,, | Performed by: NURSE PRACTITIONER

## 2023-07-07 PROCEDURE — 1111F PR DISCHARGE MEDS RECONCILED W/ CURRENT OUTPATIENT MED LIST: ICD-10-PCS | Mod: CPTII,S$GLB,, | Performed by: NURSE PRACTITIONER

## 2023-07-07 PROCEDURE — 3074F SYST BP LT 130 MM HG: CPT | Mod: CPTII,S$GLB,, | Performed by: NURSE PRACTITIONER

## 2023-07-07 PROCEDURE — 1126F PR PAIN SEVERITY QUANTIFIED, NO PAIN PRESENT: ICD-10-PCS | Mod: CPTII,S$GLB,, | Performed by: NURSE PRACTITIONER

## 2023-07-07 PROCEDURE — 3078F DIAST BP <80 MM HG: CPT | Mod: CPTII,S$GLB,, | Performed by: NURSE PRACTITIONER

## 2023-07-09 LAB
ACID FAST MOD KINY STN SPEC: NORMAL
MYCOBACTERIUM SPEC QL CULT: NORMAL

## 2023-07-12 ENCOUNTER — HOSPITAL ENCOUNTER (EMERGENCY)
Facility: HOSPITAL | Age: 73
Discharge: HOME | End: 2023-07-12
Attending: EMERGENCY MEDICINE
Payer: MEDICARE

## 2023-07-12 VITALS
OXYGEN SATURATION: 99 % | TEMPERATURE: 98 F | HEIGHT: 71 IN | SYSTOLIC BLOOD PRESSURE: 186 MMHG | DIASTOLIC BLOOD PRESSURE: 92 MMHG | BODY MASS INDEX: 29.82 KG/M2 | HEART RATE: 69 BPM | RESPIRATION RATE: 18 BRPM | WEIGHT: 213 LBS

## 2023-07-12 VITALS
TEMPERATURE: 97 F | RESPIRATION RATE: 15 BRPM | HEART RATE: 73 BPM | DIASTOLIC BLOOD PRESSURE: 63 MMHG | OXYGEN SATURATION: 98 % | SYSTOLIC BLOOD PRESSURE: 125 MMHG

## 2023-07-12 DIAGNOSIS — M54.41 CHRONIC RIGHT-SIDED LOW BACK PAIN WITH RIGHT-SIDED SCIATICA: ICD-10-CM

## 2023-07-12 DIAGNOSIS — R21 SKIN RASH: Primary | ICD-10-CM

## 2023-07-12 DIAGNOSIS — G89.29 CHRONIC RIGHT-SIDED LOW BACK PAIN WITH RIGHT-SIDED SCIATICA: ICD-10-CM

## 2023-07-12 PROCEDURE — 25000003 PHARM REV CODE 250: Performed by: EMERGENCY MEDICINE

## 2023-07-12 PROCEDURE — 99283 EMERGENCY DEPT VISIT LOW MDM: CPT

## 2023-07-12 RX ORDER — MUPIROCIN 20 MG/G
1 OINTMENT TOPICAL
Status: COMPLETED | OUTPATIENT
Start: 2023-07-12 | End: 2023-07-12

## 2023-07-12 RX ORDER — LOSARTAN POTASSIUM 50 MG/1
50 TABLET ORAL DAILY
Status: ON HOLD | COMMUNITY
End: 2023-07-25 | Stop reason: HOSPADM

## 2023-07-12 RX ORDER — METOLAZONE 5 MG/1
5 TABLET ORAL
Status: ON HOLD | COMMUNITY
End: 2023-08-09 | Stop reason: HOSPADM

## 2023-07-12 RX ORDER — AMIODARONE HYDROCHLORIDE 200 MG/1
200 TABLET ORAL DAILY
Status: ON HOLD | COMMUNITY
End: 2024-01-19 | Stop reason: HOSPADM

## 2023-07-12 RX ORDER — ALBUTEROL SULFATE 90 UG/1
2 AEROSOL, METERED RESPIRATORY (INHALATION) EVERY 6 HOURS PRN
Qty: 8.5 G | Refills: 0 | OUTPATIENT
Start: 2023-07-12 | End: 2023-11-27

## 2023-07-12 RX ORDER — DOXYLAMINE SUCCINATE 25 MG
TABLET ORAL 2 TIMES DAILY
Status: DISCONTINUED | OUTPATIENT
Start: 2023-07-12 | End: 2023-07-12 | Stop reason: HOSPADM

## 2023-07-12 RX ORDER — METOPROLOL SUCCINATE 25 MG/1
25 TABLET, EXTENDED RELEASE ORAL DAILY
Status: ON HOLD | COMMUNITY
End: 2024-01-19 | Stop reason: HOSPADM

## 2023-07-12 RX ORDER — HYDROCODONE BITARTRATE AND ACETAMINOPHEN 5; 325 MG/1; MG/1
1 TABLET ORAL EVERY 6 HOURS PRN
Qty: 8 TABLET | Refills: 0 | Status: SHIPPED | OUTPATIENT
Start: 2023-07-12 | End: 2023-07-15

## 2023-07-12 RX ORDER — AMMONIUM LACTATE 12 G/100G
LOTION TOPICAL ONCE
Status: COMPLETED | OUTPATIENT
Start: 2023-07-12 | End: 2023-07-12

## 2023-07-12 RX ORDER — HYDROCODONE BITARTRATE AND ACETAMINOPHEN 5; 325 MG/1; MG/1
1 TABLET ORAL
Status: COMPLETED | OUTPATIENT
Start: 2023-07-12 | End: 2023-07-12

## 2023-07-12 RX ADMIN — MICONAZOLE NITRATE: 20 CREAM TOPICAL at 03:07

## 2023-07-12 RX ADMIN — HYDROCODONE BITARTRATE AND ACETAMINOPHEN 1 TABLET: 5; 325 TABLET ORAL at 03:07

## 2023-07-12 RX ADMIN — MUPIROCIN 22 G: 20 OINTMENT TOPICAL at 03:07

## 2023-07-12 RX ADMIN — Medication: at 03:07

## 2023-07-12 NOTE — ED PROVIDER NOTES
Encounter Date: 7/12/2023       History     Chief Complaint   Patient presents with    Back Pain     Pt here via EMS with reports of pain and open wound to right flank area going down right leg. Pt reports it started 1 week ago. Pt has blistered like wound noted to lower right back with odor and drainage.     72-year-old male presenting with pain at healing wound site of back and frontal leg.  Patient reports symptoms have been ongoing for at least a week.  Denies fevers, chills, nausea, vomiting.  Also notes pain running from right hip down towards right foot.  Recently seen in the emergency department for chronic left knee pain and gout.  Denies numbness or weakness.  Previously in usual state of health.    Review of patient's allergies indicates:  No Known Allergies  Past Medical History:   Diagnosis Date    Acute congestive heart failure 3/20/2020    Alcohol abuse     Arthritis     Asthma attack 11/24/2021    Coronary artery disease     Diabetes mellitus     Hyperlipemia     Hypertension     Multiple thyroid nodules 6/14/2023    Rhabdomyolysis      Past Surgical History:   Procedure Laterality Date    EYE SURGERY      TRANSESOPHAGEAL ECHOCARDIOGRAM WITH POSSIBLE CARDIOVERSION (MARIA W/ POSS CARDIOVERSION) N/A 1/5/2023    Procedure: Transesophageal echo (MARIA) intra-procedure log documentation;  Surgeon: Indra Foote MD;  Location: [facility] CATH LAB;  Service: Cardiology;  Laterality: N/A;    TREATMENT OF CARDIAC ARRHYTHMIA N/A 12/7/2020    Procedure: Cardioversion or Defibrillation;  Surgeon: Indra Foote MD;  Location: [facility] CATH LAB;  Service: Cardiology;  Laterality: N/A;    TREATMENT OF CARDIAC ARRHYTHMIA N/A 12/30/2020    Procedure: Cardioversion or Defibrillation;  Surgeon: Tyrone Steen MD;  Location: [facility] CATH LAB;  Service: Cardiology;  Laterality: N/A;    TREATMENT OF CARDIAC ARRHYTHMIA N/A 1/5/2023    Procedure: Cardioversion or Defibrillation;  Surgeon: Indra Foote MD;  Location:  [facility] CATH LAB;  Service: Cardiology;  Laterality: N/A;    VASCULAR SURGERY       Family History   Problem Relation Age of Onset    Hypertension Mother     Diabetes Mother     Diabetes Father     Hypertension Father     Diabetes Sister     Hypertension Sister      Social History     Tobacco Use    Smoking status: Never    Smokeless tobacco: Never   Substance Use Topics    Alcohol use: Not Currently     Alcohol/week: 6.0 standard drinks     Types: 6 Cans of beer per week     Comment: daily    Drug use: No     Review of Systems   Constitutional:  Negative for fever.   Musculoskeletal:  Negative for back pain.   Skin:  Positive for rash and wound.   Neurological:  Negative for weakness.     Physical Exam     Initial Vitals [07/12/23 0116]   BP Pulse Resp Temp SpO2   (!) 158/73 69 18 98.3 °F (36.8 °C) 98 %      MAP       --         Physical Exam    Constitutional: He appears well-developed and well-nourished. He is not diaphoretic. No distress.   HENT:   Head: Normocephalic and atraumatic.   Right Ear: External ear normal.   Left Ear: External ear normal.   Eyes: EOM are normal. Pupils are equal, round, and reactive to light. Right eye exhibits no discharge. Left eye exhibits no discharge.   Neck:   Normal range of motion.  Cardiovascular:  Normal rate.           Pulmonary/Chest: No respiratory distress.   Abdominal: He exhibits no distension. There is no guarding.   Musculoskeletal:         General: No edema. Normal range of motion.      Cervical back: Normal range of motion.     Neurological: He is alert and oriented to person, place, and time. He has normal strength. GCS score is 15. GCS eye subscore is 4. GCS verbal subscore is 5. GCS motor subscore is 6.   Skin: Skin is warm and dry. Rash noted.   Approximately 10 x 5 area of somewhat open wound with scaling and crusting.  Wound appears to wrap around towards inner thigh in approximately a 30 x 20 area.  Scabbing with healed skin underneath.  No redness warmth or  erythema.  No evidence of cellulitis.  Nontender to touch except for 1 small area of acute wound on the small of his back   Psychiatric: He has a normal mood and affect. His behavior is normal.       ED Course   Procedures  Labs Reviewed - No data to display       Imaging Results    None          Medications   ammonium lactate 12 % lotion (has no administration in time range)   mupirocin 2 % ointment 22 g (has no administration in time range)   miconazole 2 % cream (has no administration in time range)   HYDROcodone-acetaminophen 5-325 mg per tablet 1 tablet (has no administration in time range)     Medical Decision Making:   Initial Assessment:   Patient presenting with pain to wound.  On exam he is well-appearing in no acute distress.  On distracted exam, wounds are mostly nontender.  One small area of unhealed wound remainder of wounds appeared to be well healed with scab falling off revealing new epithelium and healed skin.  Wound pattern has some vague dermatomal distribution though does not exactly fit.  Size of scabs up to 1-2 cm in diameter.  Larger than would be expected for herpes zoster.  Unclear cause.  Patient does have some fungal overgrowth in his groin noted on exam.  Will provide Bactroban to newer unhealed area at small of back.  Will provide Lac-Hydrin for remainder.  Will provide miconazole for inner groin area.  No evidence for severe soft tissue injury or infection.  Will provide short course of analgesia.  Otherwise safe for discharge home.                        Clinical Impression:   Final diagnoses:  [R21] Skin rash (Primary)  [M54.41, G89.29] Chronic right-sided low back pain with right-sided sciatica        ED Disposition Condition    Discharge Stable          ED Prescriptions       Medication Sig Dispense Start Date End Date Auth. Provider    HYDROcodone-acetaminophen (NORCO) 5-325 mg per tablet Take 1 tablet by mouth every 6 (six) hours as needed for Pain. 8 tablet 7/12/2023 7/15/2023  Chan Alvarez MD          Follow-up Information    None          Chan Alvarez MD  07/12/23 1405

## 2023-07-12 NOTE — DISCHARGE INSTRUCTIONS
Apply the Bactroban to the open wound in the back.  Apply the Lac-Hydrin to all areas of scabbing and crusting.  Use the miconazole towards the inside of your groin.  Please keep your appointment with your orthopedist on Friday.  Please return for any new or worsening concerns.

## 2023-07-12 NOTE — PROGRESS NOTES
[facility]  ED to ECC Visit    Visit Date: 7/7/2023  Encounter Provider: Shai Dey   PCP:  Irlanda Valenzuela    PRESENTING HISTORY      Patient ID: Chato Bowie is a 72 y.o. male.    Consult Requested By:  Dr. Chan Alvarez  Reason for Consult:  Hospital Follow Up.    Chato is being seen at home due to being seen at home due to physical debility that presents a taxing effort to leave the home, to mitigate high risk of hospital readmission and/or due to the limited availability of reliable or safe options for transportation to the point of access to the provider. Prior to treatment on this visit the chart was reviewed and patient verbal consent was obtained.      Chief Complaint: Follow-up        History of Present Illness: Mr. Chato Bowie is a 72 y.o. male who was recently admitted to the hospital.    Medical Decision Making:   Initial Assessment:   72 year old patient with hx of chronic left knee pain presenting secondary to chest pain and knee pain. Troponins, Cxr, ekg, and labs ordered which showed trop at baseline and negative x 2.     Also considered but less likely:      PE: normal rate, no sob/recent immovilization/surgery/travel/family history  Pneumonia: chest xray negative. No fever or leukoscytosis. No cough and lungs non consistent with pna  Tamponade: unlikely due to chest xray and ekg  STEMI: No STEMI on ekg  Dissection: equal pulses bilaterally and no ripping chest pain to the back  Esophageal rupture: no dysphagia or vomiting and chest xray negative for mediastinal air  Arrhythmia: no arrhythmia on ekg  CHF: no fluid overload on Cxr and physical exam  Pneumothorax: bilateral breath sounds and no signs of pneumothorax on chest xray      Vitals with mild hypertension.  Knee with chronic effusion and pain.  Possible gout versus pseudogout?  Orthopedics has tried to tap in the past unsuccessfully.  Will not re-attempted today.  Patient given Solu-Medrol and prescription for  prednisone.  Patient states that he does not have resources at home.  I have reviewed the case with case management who has reviewed his chart.  The patient is supposed to have home health and PT OT visiting him at home.  He has been referred to orthopedist for a left knee replacement.  Is unclear to me why he is not yet gone.  Patient given prescription for prednisone and analgesia.  Patient reports pain improved.  I have arranged a definitive scheduled appointment with Orthopedics to discuss knee replacement.  Believe he is otherwise safe for discharge home at this time.  Discussed return precautions.                    Clinical Impression:   Final diagnoses:  [R07.9] Chest pain  [M25.562] Left knee pain  ___________________________________________________________________    Today: With this visit today patient is found sitting up in a chair in his living room, ambulating with a cane.  Patient alone for the visit. Patient is AAOx3.  Currently seeing family [facility] with PT/OT/RN.  Case management referral placed to assist patient with transportation.  Albuterol inhaler refills local pharmacy.  Plans to attend follow-up appointments with ortho and Cardiology.  See note below. Denies falls.  Patient endorses ability to perform ADls. Endorses eating x 3 meals per day, daily BMs, and adequate sleep pattern.     VSS. Denies fever, chest pain, shortness of breath, nausea, vomiting, diarrhea. Risks of environmental exposure to coronavirus discussed including: social distancing, hand hygiene, and limiting departures from the home for necessities only.  Reports understanding and willingness to comply.  All hospital discharge orders reviewed and being followed, all medications reconciled and reviewed, patient and family verbalized understanding. No other needs identified at this time.      Review of Systems    Assessments:  Environmental:  Clean, apartment  Functional Status:  Self sufficient  Safety:  Moderate to high  fall risk  Nutritional:  Adequate per patient  Home Health/DME/Supplies:  Cane, walker    PAST HISTORY:     Past Medical History:   Diagnosis Date    Acute congestive heart failure 3/20/2020    Alcohol abuse     Arthritis     Asthma attack 11/24/2021    Coronary artery disease     Diabetes mellitus     Hyperlipemia     Hypertension     Multiple thyroid nodules 6/14/2023    Rhabdomyolysis        Past Surgical History:   Procedure Laterality Date    EYE SURGERY      TRANSESOPHAGEAL ECHOCARDIOGRAM WITH POSSIBLE CARDIOVERSION (MARIA W/ POSS CARDIOVERSION) N/A 1/5/2023    Procedure: Transesophageal echo (MARIA) intra-procedure log documentation;  Surgeon: Indra Foote MD;  Location: [facility] CATH LAB;  Service: Cardiology;  Laterality: N/A;    TREATMENT OF CARDIAC ARRHYTHMIA N/A 12/7/2020    Procedure: Cardioversion or Defibrillation;  Surgeon: Indra Foote MD;  Location: [facility] CATH LAB;  Service: Cardiology;  Laterality: N/A;    TREATMENT OF CARDIAC ARRHYTHMIA N/A 12/30/2020    Procedure: Cardioversion or Defibrillation;  Surgeon: Tyrone Steen MD;  Location: [facility] CATH LAB;  Service: Cardiology;  Laterality: N/A;    TREATMENT OF CARDIAC ARRHYTHMIA N/A 1/5/2023    Procedure: Cardioversion or Defibrillation;  Surgeon: Indra Foote MD;  Location: [facility] CATH LAB;  Service: Cardiology;  Laterality: N/A;    VASCULAR SURGERY         Family History   Problem Relation Age of Onset    Hypertension Mother     Diabetes Mother     Diabetes Father     Hypertension Father     Diabetes Sister     Hypertension Sister        Social History     Socioeconomic History    Marital status: Single   Tobacco Use    Smoking status: Never    Smokeless tobacco: Never   Substance and Sexual Activity    Alcohol use: Not Currently     Alcohol/week: 6.0 standard drinks     Types: 6 Cans of beer per week     Comment: daily    Drug use: No    Sexual activity: Yes     Partners: Female     Social Determinants of Health     Financial Resource  Strain: Medium Risk    Difficulty of Paying Living Expenses: Somewhat hard   Food Insecurity: No Food Insecurity    Worried About Running Out of Food in the Last Year: Never true    Ran Out of Food in the Last Year: Never true   Transportation Needs: No Transportation Needs    Lack of Transportation (Medical): No    Lack of Transportation (Non-Medical): No   Physical Activity: Inactive    Days of Exercise per Week: 0 days    Minutes of Exercise per Session: 0 min   Stress: Stress Concern Present    Feeling of Stress : To some extent   Social Connections: Socially Isolated    Frequency of Communication with Friends and Family: Once a week    Frequency of Social Gatherings with Friends and Family: Once a week    Attends Sabianism Services: Never    Active Member of Clubs or Organizations: No    Attends Club or Organization Meetings: Never    Marital Status: Never    Housing Stability: Low Risk     Unable to Pay for Housing in the Last Year: No    Number of Places Lived in the Last Year: 1    Unstable Housing in the Last Year: No       MEDICATIONS & ALLERGIES:     Current Outpatient Medications on File Prior to Visit   Medication Sig Dispense Refill    amiodarone (PACERONE) 200 MG Tab Take 200 mg by mouth once daily.      diclofenac sodium (VOLTAREN) 1 % Gel Apply 2 g topically 2 (two) times daily as needed (pain). 100 g 0    ELIQUIS 5 mg Tab Take 5 mg by mouth 2 (two) times daily.      furosemide (LASIX) 40 MG tablet Take 1 tablet (40 mg total) by mouth 2 (two) times daily.      hydrALAZINE (APRESOLINE) 50 MG tablet Take 1.5 tablets (75 mg total) by mouth 3 (three) times daily. 90 tablet 1    insulin aspart U-100 (NOVOLOG) 100 unit/mL (3 mL) InPn pen Inject 5 Units into the skin 3 (three) times daily. 4.5 mL 11    losartan (COZAAR) 50 MG tablet Take 50 mg by mouth once daily.      magnesium oxide (MAG-OX) 400 mg (241.3 mg magnesium) tablet Take 800 mg by mouth.      metOLazone (ZAROXOLYN) 5 MG tablet Take 5 mg  by mouth 3 (three) times a week.      metoprolol succinate (TOPROL-XL) 25 MG 24 hr tablet Take 25 mg by mouth once daily.      NOVOLIN 70/30 U-100 INSULIN 100 unit/mL (70-30) injection Inject 55 Units into the skin 2 (two) times daily.      potassium chloride (KLOR-CON) 10 MEQ TbSR Take 10 mEq by mouth once daily.      rosuvastatin (CRESTOR) 40 MG Tab Take 40 mg by mouth every evening.      sildenafiL (VIAGRA) 100 MG tablet TAKE 1 TABLET BY MOUTH ONCE DAILY AS NEEDED FOR ERECTILE DYSFUNCTION. 30 tablet 0    tamsulosin (FLOMAX) 0.4 mg Cap Take 1 capsule (0.4 mg total) by mouth once daily. 30 capsule 11    traMADoL (ULTRAM) 50 mg tablet Take 1 tablet (50 mg total) by mouth every 12 (twelve) hours as needed for Pain. 14 tablet 0    [DISCONTINUED] albuterol (PROVENTIL/VENTOLIN HFA) 90 mcg/actuation inhaler Inhale 2 puffs into the lungs every 4 (four) hours as needed. 8.5 g 5     No current facility-administered medications on file prior to visit.        Review of patient's allergies indicates:  No Known Allergies    OBJECTIVE:     Vital Signs:  Vitals:    07/12/23 0750   BP: 125/63   Pulse: 73   Resp: 15   Temp: 97.3 °F (36.3 °C)     There is no height or weight on file to calculate BMI.     Physical Exam:  Physical Exam    Laboratory  Lab Results   Component Value Date    WBC 9.14 07/05/2023    HGB 12.5 (L) 07/05/2023    HCT 37.4 (L) 07/05/2023    MCV 82 07/05/2023     07/05/2023     Lab Results   Component Value Date    INR 1.1 01/30/2023    INR 1.1 09/05/2021    INR 1.1 03/23/2021     Lab Results   Component Value Date    HGBA1C 7.8 (H) 06/02/2023     No results for input(s): POCTGLUCOSE in the last 72 hours.      TRANSITION OF CARE:       Family and/or Caretaker present at visit?  No.  Diagnostic tests reviewed/disposition: No diagnosic tests pending after this hospitalization.  Disease/illness education: Importance of compliance with all prescribed medication and treatments, COVID precautions/Social  Distancing/Mask Use  Home health/community services discussion/referrals: Patient has home health established at [facility] .   Establishment or re-establishment of referral orders for community resources: No other necessary community resources.   Discussion with other health care providers: No discussion with other health care providers necessary.     Transition of Care Visit:  I have reviewed and updated the history and problem list.  I have reconciled the medication list.  I have discussed the hospitalization and current medical issues, prognosis and plans with the patient/family.  I  spent more than 50% of time discussing the care with the patient/family.  Total Face-to-Face Encounter: 60 minutes.    Medications Reconciliation:   I have reconciled the patient's home medications and discharge medications with the patient/family. I have updated all changes.  Refer to After-Visit Medication List.    I have discussed discharge plans, follow-up instructions, future appointments, provider contact information, indicators to seek medical emergency treatment, and advisement to call with additional questions or concerns. Patient and caregiver verbalize understanding.    ASSESSMENT & PLAN:       1. Left knee pain  Assessment & Plan:  --has a follow-up appointment with ortho on 07/14/2023 that he plans to attend  --currently wearing knee brace  --reports pain well controlled  --ambulation with cane or walker    Orders:  -     Ambulatory referral/consult to [facility]  -     Ambulatory referral/consult to Outpatient Case Management    Other orders  -     albuterol (PROVENTIL/VENTOLIN HFA) 90 mcg/actuation inhaler; Inhale 2 puffs into the lungs every 6 (six) hours as needed for Wheezing or Shortness of Breath.  Dispense: 8.5 g; Refill: 0         Were controlled substances prescribed?  No    Instructions for the patient:    Scheduled Follow-up :  Future Appointments   Date Time Provider Department  [place]   7/14/2023  9:20 AM Bran Helton MD [facility] [place] [facility]   7/19/2023 10:15 AM Tyrone Steen MD [facility] CARDIO [facility]       After Visit Medication List :     Medication List            Accurate as of July 7, 2023 11:59 PM. If you have any questions, ask your nurse or doctor.                CHANGE how you take these medications      albuterol 90 mcg/actuation inhaler  Commonly known as: PROVENTIL/VENTOLIN HFA  Inhale 2 puffs into the lungs every 6 (six) hours as needed for Wheezing or Shortness of Breath.  What changed:   when to take this  reasons to take this  Changed by: Shai Dey NP            CONTINUE taking these medications      amiodarone 200 MG Tab  Commonly known as: PACERONE     diclofenac sodium 1 % Gel  Commonly known as: VOLTAREN  Apply 2 g topically 2 (two) times daily as needed (pain).     ELIQUIS 5 mg Tab  Generic drug: apixaban     furosemide 40 MG tablet  Commonly known as: LASIX  Take 1 tablet (40 mg total) by mouth 2 (two) times daily.     hydrALAZINE 50 MG tablet  Commonly known as: APRESOLINE  Take 1.5 tablets (75 mg total) by mouth 3 (three) times daily.     insulin aspart U-100 100 unit/mL (3 mL) Inpn pen  Commonly known as: NovoLOG  Inject 5 Units into the skin 3 (three) times daily.     losartan 50 MG tablet  Commonly known as: COZAAR     magnesium oxide 400 mg (241.3 mg magnesium) tablet  Commonly known as: MAG-OX     metOLazone 5 MG tablet  Commonly known as: ZAROXOLYN     metoprolol succinate 25 MG 24 hr tablet  Commonly known as: TOPROL-XL     NovoLIN 70/30 U-100 Insulin 100 unit/mL (70-30) injection  Generic drug: insulin NPH-insulin regular (70/30)     oxyCODONE-acetaminophen 5-325 mg per tablet  Commonly known as: PERCOCET  Take 1 tablet by mouth every 6 (six) hours as needed for Pain.     potassium chloride 10 MEQ Tbsr  Commonly known as: KLOR-CON     predniSONE 20 MG tablet  Commonly known as: DELTASONE  Take 3 tablets (60 mg total) by mouth once daily.  for 5 days     rosuvastatin 40 MG Tab  Commonly known as: CRESTOR     sildenafiL 100 MG tablet  Commonly known as: VIAGRA  TAKE 1 TABLET BY MOUTH ONCE DAILY AS NEEDED FOR ERECTILE DYSFUNCTION.     tamsulosin 0.4 mg Cap  Commonly known as: FLOMAX  Take 1 capsule (0.4 mg total) by mouth once daily.     traMADoL 50 mg tablet  Commonly known as: ULTRAM  Take 1 tablet (50 mg total) by mouth every 12 (twelve) hours as needed for Pain.               Where to Get Your Medications        You can get these medications from any pharmacy    Bring a paper prescription for each of these medications  albuterol 90 mcg/actuation inhaler         Signature: Shai Dey NP

## 2023-07-12 NOTE — ED TRIAGE NOTES
Pt BIB EMS with c/o back pain x1 week. Pt states right lower back pain radiating to right thigh, pt said pain worsens when walking. Dry scabs noted in lower right back and rash of inner right thigh area noted. Pt uses cane to ambulate. Pt denies fever or chills. Pt reports wound present 2 weeks ago. Pt AAOx4, NAD, VSS.

## 2023-07-12 NOTE — ASSESSMENT & PLAN NOTE
--has a follow-up appointment with ortho on 07/14/2023 that he plans to attend  --currently wearing knee brace  --reports pain well controlled  --ambulation with cane or walker

## 2023-07-13 ENCOUNTER — HOSPITAL ENCOUNTER (EMERGENCY)
Facility: HOSPITAL | Age: 73
Discharge: HOME | End: 2023-07-13
Attending: EMERGENCY MEDICINE
Payer: MEDICARE

## 2023-07-13 VITALS
TEMPERATURE: 98 F | SYSTOLIC BLOOD PRESSURE: 168 MMHG | BODY MASS INDEX: 29.82 KG/M2 | WEIGHT: 213 LBS | OXYGEN SATURATION: 100 % | RESPIRATION RATE: 18 BRPM | HEIGHT: 71 IN | HEART RATE: 68 BPM | DIASTOLIC BLOOD PRESSURE: 72 MMHG

## 2023-07-13 DIAGNOSIS — R73.9 HYPERGLYCEMIA: ICD-10-CM

## 2023-07-13 DIAGNOSIS — B02.9 HERPES ZOSTER WITHOUT COMPLICATION: Primary | ICD-10-CM

## 2023-07-13 LAB
ALBUMIN SERPL BCP-MCNC: 3.2 G/DL (ref 3.5–5.2)
ALLENS TEST: ABNORMAL
ALP SERPL-CCNC: 71 U/L (ref 55–135)
ALT SERPL W/O P-5'-P-CCNC: 25 U/L (ref 10–44)
ANION GAP SERPL CALC-SCNC: 13 MMOL/L (ref 8–16)
AST SERPL-CCNC: 12 U/L (ref 10–40)
BASOPHILS # BLD AUTO: 0.03 K/UL (ref 0–0.2)
BASOPHILS NFR BLD: 0.3 % (ref 0–1.9)
BILIRUB SERPL-MCNC: 0.6 MG/DL (ref 0.1–1)
BUN SERPL-MCNC: 92 MG/DL (ref 8–23)
CALCIUM SERPL-MCNC: 9.2 MG/DL (ref 8.7–10.5)
CHLORIDE SERPL-SCNC: 94 MMOL/L (ref 95–110)
CO2 SERPL-SCNC: 26 MMOL/L (ref 23–29)
CREAT SERPL-MCNC: 2.6 MG/DL (ref 0.5–1.4)
DIFFERENTIAL METHOD: ABNORMAL
EOSINOPHIL # BLD AUTO: 0 K/UL (ref 0–0.5)
EOSINOPHIL NFR BLD: 0 % (ref 0–8)
ERYTHROCYTE [DISTWIDTH] IN BLOOD BY AUTOMATED COUNT: 13.2 % (ref 11.5–14.5)
EST. GFR  (NO RACE VARIABLE): 25 ML/MIN/1.73 M^2
GLUCOSE SERPL-MCNC: 364 MG/DL (ref 70–110)
HCO3 UR-SCNC: 27.8 MMOL/L (ref 24–28)
HCT VFR BLD AUTO: 33.5 % (ref 40–54)
HGB BLD-MCNC: 11.3 G/DL (ref 14–18)
IMM GRANULOCYTES # BLD AUTO: 0.3 K/UL (ref 0–0.04)
IMM GRANULOCYTES NFR BLD AUTO: 3 % (ref 0–0.5)
LYMPHOCYTES # BLD AUTO: 0.5 K/UL (ref 1–4.8)
LYMPHOCYTES NFR BLD: 4.8 % (ref 18–48)
MCH RBC QN AUTO: 26.8 PG (ref 27–31)
MCHC RBC AUTO-ENTMCNC: 33.7 G/DL (ref 32–36)
MCV RBC AUTO: 80 FL (ref 82–98)
MONOCYTES # BLD AUTO: 0.2 K/UL (ref 0.3–1)
MONOCYTES NFR BLD: 1.6 % (ref 4–15)
NEUTROPHILS # BLD AUTO: 9.1 K/UL (ref 1.8–7.7)
NEUTROPHILS NFR BLD: 90.3 % (ref 38–73)
NRBC BLD-RTO: 0 /100 WBC
PCO2 BLDA: 36 MMHG (ref 35–45)
PH SMN: 7.5 [PH] (ref 7.35–7.45)
PLATELET # BLD AUTO: 289 K/UL (ref 150–450)
PMV BLD AUTO: 8.9 FL (ref 9.2–12.9)
PO2 BLDA: 32 MMHG (ref 40–60)
POC BE: 5 MMOL/L
POC SATURATED O2: 68 % (ref 95–100)
POC TCO2: 29 MMOL/L (ref 24–29)
POTASSIUM SERPL-SCNC: 3.2 MMOL/L (ref 3.5–5.1)
PROT SERPL-MCNC: 6.6 G/DL (ref 6–8.4)
RBC # BLD AUTO: 4.21 M/UL (ref 4.6–6.2)
SAMPLE: ABNORMAL
SITE: ABNORMAL
SODIUM SERPL-SCNC: 133 MMOL/L (ref 136–145)
WBC # BLD AUTO: 10.04 K/UL (ref 3.9–12.7)

## 2023-07-13 PROCEDURE — 25000003 PHARM REV CODE 250: Performed by: EMERGENCY MEDICINE

## 2023-07-13 PROCEDURE — 96360 HYDRATION IV INFUSION INIT: CPT

## 2023-07-13 PROCEDURE — 99284 EMERGENCY DEPT VISIT MOD MDM: CPT | Mod: 25

## 2023-07-13 PROCEDURE — 82803 BLOOD GASES ANY COMBINATION: CPT

## 2023-07-13 PROCEDURE — 96361 HYDRATE IV INFUSION ADD-ON: CPT

## 2023-07-13 PROCEDURE — 80053 COMPREHEN METABOLIC PANEL: CPT | Performed by: EMERGENCY MEDICINE

## 2023-07-13 PROCEDURE — 99900035 HC TECH TIME PER 15 MIN (STAT)

## 2023-07-13 PROCEDURE — 85025 COMPLETE CBC W/AUTO DIFF WBC: CPT | Performed by: EMERGENCY MEDICINE

## 2023-07-13 RX ORDER — GABAPENTIN 300 MG/1
300 CAPSULE ORAL 3 TIMES DAILY
Qty: 90 CAPSULE | Refills: 0 | Status: ON HOLD | OUTPATIENT
Start: 2023-07-13 | End: 2023-07-25 | Stop reason: HOSPADM

## 2023-07-13 RX ORDER — VALACYCLOVIR HYDROCHLORIDE 1 G/1
1000 TABLET, FILM COATED ORAL 3 TIMES DAILY
Qty: 21 TABLET | Refills: 0 | Status: ON HOLD | OUTPATIENT
Start: 2023-07-13 | End: 2023-07-25 | Stop reason: HOSPADM

## 2023-07-13 RX ADMIN — POTASSIUM BICARBONATE 40 MEQ: 391 TABLET, EFFERVESCENT ORAL at 02:07

## 2023-07-13 RX ADMIN — SODIUM CHLORIDE 1000 ML: 9 INJECTION, SOLUTION INTRAVENOUS at 09:07

## 2023-07-13 NOTE — ED TRIAGE NOTES
Pt presents to the ED for CC of back pain from shingles. Pt was seen in the ED yesterday and unable to  his medications so he came back to the ED. Pt is alert and orientedx4 and in no apparent distress at this time. Pt denies chest pain, SOB, NVD.  
HEMATURIA

## 2023-07-13 NOTE — DISCHARGE INSTRUCTIONS

## 2023-07-13 NOTE — ED PROVIDER NOTES
Encounter Date: 7/13/2023       History     Chief Complaint   Patient presents with    Back Pain     Pt BIB EMS from home for back pain. Pt was seen in ED on yesterday and stated he was told he had fungus to back. Pt stated he was not able to get Rx.      72 y.o. male Past Medical History:  3/20/2020: Acute congestive heart failure  No date: Alcohol abuse  No date: Arthritis  11/24/2021: Asthma attack  No date: Coronary artery disease  No date: Diabetes mellitus  No date: Hyperlipemia  No date: Hypertension  6/14/2023: Multiple thyroid nodules  No date: Rhabdomyolysis     Presents for evaluation of wound to back for over a week. Was seen in the ED yesterday and prescribed antifungal/bactroban and lac hydrin creams. Pt states that he is unable to reach many of the areas where the rash Is. States that he has persistent pain which Is why he returned. Denies f/c, n/v, diarrhea/dysuria or other c/o.        Review of patient's allergies indicates:  No Known Allergies  Past Medical History:   Diagnosis Date    Acute congestive heart failure 3/20/2020    Alcohol abuse     Arthritis     Asthma attack 11/24/2021    Coronary artery disease     Diabetes mellitus     Hyperlipemia     Hypertension     Multiple thyroid nodules 6/14/2023    Rhabdomyolysis      Past Surgical History:   Procedure Laterality Date    EYE SURGERY      TRANSESOPHAGEAL ECHOCARDIOGRAM WITH POSSIBLE CARDIOVERSION (MARIA W/ POSS CARDIOVERSION) N/A 1/5/2023    Procedure: Transesophageal echo (MARIA) intra-procedure log documentation;  Surgeon: Indra Foote MD;  Location: [facility] CATH LAB;  Service: Cardiology;  Laterality: N/A;    TREATMENT OF CARDIAC ARRHYTHMIA N/A 12/7/2020    Procedure: Cardioversion or Defibrillation;  Surgeon: Indra Foote MD;  Location: [facility] CATH LAB;  Service: Cardiology;  Laterality: N/A;    TREATMENT OF CARDIAC ARRHYTHMIA N/A 12/30/2020    Procedure: Cardioversion or Defibrillation;  Surgeon: Tyrone Steen MD;  Location:  [facility] CATH LAB;  Service: Cardiology;  Laterality: N/A;    TREATMENT OF CARDIAC ARRHYTHMIA N/A 1/5/2023    Procedure: Cardioversion or Defibrillation;  Surgeon: Indra Foote MD;  Location: [facility] CATH LAB;  Service: Cardiology;  Laterality: N/A;    VASCULAR SURGERY       Family History   Problem Relation Age of Onset    Hypertension Mother     Diabetes Mother     Diabetes Father     Hypertension Father     Diabetes Sister     Hypertension Sister      Social History     Tobacco Use    Smoking status: Never    Smokeless tobacco: Never   Substance Use Topics    Alcohol use: Not Currently     Alcohol/week: 6.0 standard drinks     Types: 6 Cans of beer per week     Comment: daily    Drug use: No     Review of Systems   Constitutional:  Negative for fever.   HENT:  Negative for sore throat.    Respiratory:  Negative for shortness of breath.    Cardiovascular:  Negative for chest pain.   Gastrointestinal:  Negative for nausea.   Genitourinary:  Negative for dysuria.   Musculoskeletal:  Negative for back pain.   Skin:  Negative for rash.   Neurological:  Negative for weakness.   Hematological:  Does not bruise/bleed easily.   All other systems reviewed and are negative.    Physical Exam     Initial Vitals [07/13/23 0939]   BP Pulse Resp Temp SpO2   (!) 171/79 78 18 97.5 °F (36.4 °C) 100 %      MAP       --         Physical Exam    Nursing note and vitals reviewed.  Constitutional: He appears well-developed and well-nourished.   HENT:   Head: Normocephalic and atraumatic.   Eyes: EOM are normal. Pupils are equal, round, and reactive to light.   Cardiovascular:  Normal rate and regular rhythm.           Pulmonary/Chest: Effort normal and breath sounds normal.   Abdominal: He exhibits no distension.   Musculoskeletal:         General: No tenderness or edema.     Neurological: He is alert and oriented to person, place, and time. No cranial nerve deficit.   Skin: Skin is warm and dry.   Psychiatric: He has a normal mood and  affect.       Approximately 10 x 5 area of scabbed over wounds, appears to wrap around towards inner thigh in approximately a 30 x 20 area.    No redness warmth or erythema.  No evidence of cellulitis or superinfection. Appears dermatomal  in the L2 dermatome.  Does not cross midline.    ED Course   Procedures  MEDICAL DECISION MAKING    After review of the patient's physical exam, ED testing, and history/symptoms, relevant labs, imaging, available outside records  a wide differential was considered including but not limited to: infectious, traumatic, vascular, toxicological , metabolic, malignant, ischemic, embolic, psychological, genetic, iatrogenic, idiopathic, medication reaction, substance dependence/intoxication/withdrawal, electrolyte or blood dyscrasia, and other etiologies.        labs/imaging/interventions include:       Medications   potassium bicarbonate disintegrating tablet 40 mEq (has no administration in time range)   sodium chloride 0.9% bolus 1,000 mL 1,000 mL (1,000 mLs Intravenous New Bag 7/13/23 9024)     Labs Reviewed   CBC W/ AUTO DIFFERENTIAL - Abnormal; Notable for the following components:       Result Value    RBC 4.21 (*)     Hemoglobin 11.3 (*)     Hematocrit 33.5 (*)     MCV 80 (*)     MCH 26.8 (*)     MPV 8.9 (*)     Immature Granulocytes 3.0 (*)     Gran # (ANC) 9.1 (*)     Immature Grans (Abs) 0.30 (*)     Lymph # 0.5 (*)     Mono # 0.2 (*)     Gran % 90.3 (*)     Lymph % 4.8 (*)     Mono % 1.6 (*)     All other components within normal limits   COMPREHENSIVE METABOLIC PANEL - Abnormal; Notable for the following components:    Sodium 133 (*)     Potassium 3.2 (*)     Chloride 94 (*)     Glucose 364 (*)     BUN 92 (*)     Creatinine 2.6 (*)     Albumin 3.2 (*)     eGFR 25 (*)     All other components within normal limits   ISTAT PROCEDURE - Abnormal; Notable for the following components:    POC PH 7.497 (*)     POC PO2 32 (*)     POC SATURATED O2 68 (*)     All other components within  normal limits      No orders to display     Vbg ph normal, not in DKA        Labs Reviewed   CBC W/ AUTO DIFFERENTIAL - Abnormal; Notable for the following components:       Result Value    RBC 4.21 (*)     Hemoglobin 11.3 (*)     Hematocrit 33.5 (*)     MCV 80 (*)     MCH 26.8 (*)     MPV 8.9 (*)     Immature Granulocytes 3.0 (*)     Gran # (ANC) 9.1 (*)     Immature Grans (Abs) 0.30 (*)     Lymph # 0.5 (*)     Mono # 0.2 (*)     Gran % 90.3 (*)     Lymph % 4.8 (*)     Mono % 1.6 (*)     All other components within normal limits   COMPREHENSIVE METABOLIC PANEL - Abnormal; Notable for the following components:    Sodium 133 (*)     Potassium 3.2 (*)     Chloride 94 (*)     Glucose 364 (*)     BUN 92 (*)     Creatinine 2.6 (*)     Albumin 3.2 (*)     eGFR 25 (*)     All other components within normal limits   ISTAT PROCEDURE - Abnormal; Notable for the following components:    POC PH 7.497 (*)     POC PO2 32 (*)     POC SATURATED O2 68 (*)     All other components within normal limits          Imaging Results    None          Medications   potassium bicarbonate disintegrating tablet 40 mEq (has no administration in time range)   sodium chloride 0.9% bolus 1,000 mL 1,000 mL (1,000 mLs Intravenous New Bag 7/13/23 0958)      Hyperglycemia without DKA. Hypokalemia noted have replaced K.    Clinically suspect zoster. Will start on valacyclovir and neurontin. Continue topical treatments. Return precautions.              I have independently evaluated and interpreted all available labs and imaging to the extent of the scope of my practice.  The suspected diagnosis, treatment and plan were discussed with the patient. All questions or concerns have been addressed.    Note was created using voice recognition software. Note may have occasional typographical or grammatical errors , garbled syntax, and other bizarre constructions that may not have been identified and edited despite good sal initial review prior to signing.         Clinical Impression:   Final diagnoses:  [B02.9] Herpes zoster without complication (Primary)  [R73.9] Hyperglycemia        ED Disposition Condition    Discharge Stable          ED Prescriptions       Medication Sig Dispense Start Date End Date Auth. Provider    gabapentin (NEURONTIN) 300 MG capsule Take 1 capsule (300 mg total) by mouth 3 (three) times daily. 90 capsule 7/13/2023 7/12/2024 Norma Ling MD    valACYclovir (VALTREX) 1000 MG tablet Take 1 tablet (1,000 mg total) by mouth 3 (three) times daily. for 7 days 21 tablet 7/13/2023 7/20/2023 Norma Ling MD          Follow-up Information       Follow up With Specialties Details Why Contact Info    [facility]    [address] [place] [address]  [phone]               Norma Ling MD  07/13/23 0630

## 2023-07-17 ENCOUNTER — HOSPITAL ENCOUNTER (INPATIENT)
Facility: HOSPITAL | Age: 73
LOS: 8 days | Discharge: HOME HEALTH | DRG: 871 | End: 2023-07-25
Attending: EMERGENCY MEDICINE | Admitting: STUDENT IN AN ORGANIZED HEALTH CARE EDUCATION/TRAINING PROGRAM
Payer: MEDICARE

## 2023-07-17 DIAGNOSIS — R79.89 ELEVATED BRAIN NATRIURETIC PEPTIDE (BNP) LEVEL: ICD-10-CM

## 2023-07-17 DIAGNOSIS — R53.1 GENERALIZED WEAKNESS: ICD-10-CM

## 2023-07-17 DIAGNOSIS — R07.9 CHEST PAIN: ICD-10-CM

## 2023-07-17 DIAGNOSIS — B02.8 HERPES ZOSTER WITH OTHER COMPLICATION: ICD-10-CM

## 2023-07-17 DIAGNOSIS — R50.9 FEVER, UNSPECIFIED FEVER CAUSE: Primary | ICD-10-CM

## 2023-07-17 DIAGNOSIS — R79.89 ELEVATED TROPONIN: ICD-10-CM

## 2023-07-17 DIAGNOSIS — I95.9 HYPOTENSION, UNSPECIFIED HYPOTENSION TYPE: ICD-10-CM

## 2023-07-17 DIAGNOSIS — R21 SKIN RASH: ICD-10-CM

## 2023-07-17 DIAGNOSIS — N17.9 AKI (ACUTE KIDNEY INJURY): ICD-10-CM

## 2023-07-17 DIAGNOSIS — M79.89 LEG SWELLING: ICD-10-CM

## 2023-07-17 DIAGNOSIS — E86.0 DEHYDRATION: ICD-10-CM

## 2023-07-17 DIAGNOSIS — A41.9 SEPSIS, DUE TO UNSPECIFIED ORGANISM, UNSPECIFIED WHETHER ACUTE ORGAN DYSFUNCTION PRESENT: ICD-10-CM

## 2023-07-17 PROBLEM — G93.40 ACUTE ENCEPHALOPATHY: Status: ACTIVE | Noted: 2023-07-17

## 2023-07-17 LAB
ALBUMIN SERPL BCP-MCNC: 3.4 G/DL (ref 3.5–5.2)
ALLENS TEST: NORMAL
ALP SERPL-CCNC: 68 U/L (ref 55–135)
ALT SERPL W/O P-5'-P-CCNC: 18 U/L (ref 10–44)
ANION GAP SERPL CALC-SCNC: 15 MMOL/L (ref 8–16)
ANION GAP SERPL CALC-SCNC: 19 MMOL/L (ref 8–16)
AST SERPL-CCNC: 18 U/L (ref 10–40)
BACTERIA #/AREA URNS HPF: ABNORMAL /HPF
BASOPHILS # BLD AUTO: 0.05 K/UL (ref 0–0.2)
BASOPHILS NFR BLD: 0.3 % (ref 0–1.9)
BILIRUB SERPL-MCNC: 1.2 MG/DL (ref 0.1–1)
BILIRUB UR QL STRIP: NEGATIVE
BNP SERPL-MCNC: 99 PG/ML (ref 0–99)
BUN SERPL-MCNC: 114 MG/DL (ref 8–23)
BUN SERPL-MCNC: 130 MG/DL (ref 6–30)
CALCIUM SERPL-MCNC: 10 MG/DL (ref 8.7–10.5)
CHLORIDE SERPL-SCNC: 96 MMOL/L (ref 95–110)
CHLORIDE SERPL-SCNC: 97 MMOL/L (ref 95–110)
CK SERPL-CCNC: 649 U/L (ref 20–200)
CLARITY UR: CLEAR
CO2 SERPL-SCNC: 24 MMOL/L (ref 23–29)
COLOR UR: YELLOW
CREAT SERPL-MCNC: 5.4 MG/DL (ref 0.5–1.4)
CREAT SERPL-MCNC: 5.9 MG/DL (ref 0.5–1.4)
CTP QC/QA: YES
CTP QC/QA: YES
DELSYS: NORMAL
DIFFERENTIAL METHOD: ABNORMAL
EOSINOPHIL # BLD AUTO: 0 K/UL (ref 0–0.5)
EOSINOPHIL NFR BLD: 0.1 % (ref 0–8)
ERYTHROCYTE [DISTWIDTH] IN BLOOD BY AUTOMATED COUNT: 14.3 % (ref 11.5–14.5)
EST. GFR  (NO RACE VARIABLE): 11 ML/MIN/1.73 M^2
GLUCOSE SERPL-MCNC: 126 MG/DL (ref 70–110)
GLUCOSE SERPL-MCNC: 127 MG/DL (ref 70–110)
GLUCOSE UR QL STRIP: ABNORMAL
HCT VFR BLD AUTO: 34.7 % (ref 40–54)
HCT VFR BLD CALC: 35 %PCV (ref 36–54)
HGB BLD-MCNC: 11.3 G/DL (ref 14–18)
HGB UR QL STRIP: ABNORMAL
HYALINE CASTS #/AREA URNS LPF: 16 /LPF
IMM GRANULOCYTES # BLD AUTO: 0.19 K/UL (ref 0–0.04)
IMM GRANULOCYTES NFR BLD AUTO: 1.1 % (ref 0–0.5)
INR PPP: 1.1 (ref 0.8–1.2)
KETONES UR QL STRIP: NEGATIVE
LACTATE SERPL-SCNC: 1.3 MMOL/L (ref 0.5–2.2)
LACTATE SERPL-SCNC: 1.8 MMOL/L (ref 0.5–2.2)
LDH SERPL L TO P-CCNC: 1.92 MMOL/L (ref 0.5–2.2)
LEUKOCYTE ESTERASE UR QL STRIP: NEGATIVE
LYMPHOCYTES # BLD AUTO: 0.9 K/UL (ref 1–4.8)
LYMPHOCYTES NFR BLD: 5.3 % (ref 18–48)
MAGNESIUM SERPL-MCNC: 2.2 MG/DL (ref 1.6–2.6)
MCH RBC QN AUTO: 26.6 PG (ref 27–31)
MCHC RBC AUTO-ENTMCNC: 32.6 G/DL (ref 32–36)
MCV RBC AUTO: 82 FL (ref 82–98)
MICROSCOPIC COMMENT: ABNORMAL
MODE: NORMAL
MONOCYTES # BLD AUTO: 1.7 K/UL (ref 0.3–1)
MONOCYTES NFR BLD: 9.5 % (ref 4–15)
NEUTROPHILS # BLD AUTO: 14.6 K/UL (ref 1.8–7.7)
NEUTROPHILS NFR BLD: 83.7 % (ref 38–73)
NITRITE UR QL STRIP: NEGATIVE
NRBC BLD-RTO: 0 /100 WBC
PH UR STRIP: 5 [PH] (ref 5–8)
PLATELET # BLD AUTO: 203 K/UL (ref 150–450)
PMV BLD AUTO: 8.9 FL (ref 9.2–12.9)
POC IONIZED CALCIUM: 1.14 MMOL/L (ref 1.06–1.42)
POC MOLECULAR INFLUENZA A AGN: NEGATIVE
POC MOLECULAR INFLUENZA B AGN: NEGATIVE
POC TCO2 (MEASURED): 23 MMOL/L (ref 23–29)
POCT GLUCOSE: 126 MG/DL (ref 70–110)
POCT GLUCOSE: 175 MG/DL (ref 70–110)
POTASSIUM BLD-SCNC: 3.3 MMOL/L (ref 3.5–5.1)
POTASSIUM SERPL-SCNC: 3.4 MMOL/L (ref 3.5–5.1)
PROCALCITONIN SERPL IA-MCNC: 0.37 NG/ML
PROT SERPL-MCNC: 7.4 G/DL (ref 6–8.4)
PROT UR QL STRIP: ABNORMAL
PROTHROMBIN TIME: 11.5 SEC (ref 9–12.5)
RBC # BLD AUTO: 4.25 M/UL (ref 4.6–6.2)
RBC #/AREA URNS HPF: 17 /HPF (ref 0–4)
SAMPLE: ABNORMAL
SAMPLE: NORMAL
SARS-COV-2 RDRP RESP QL NAA+PROBE: NEGATIVE
SITE: NORMAL
SODIUM BLD-SCNC: 134 MMOL/L (ref 136–145)
SODIUM SERPL-SCNC: 135 MMOL/L (ref 136–145)
SP GR UR STRIP: 1.01 (ref 1–1.03)
SQUAMOUS #/AREA URNS HPF: 0 /HPF
TROPONIN I SERPL DL<=0.01 NG/ML-MCNC: 0.16 NG/ML (ref 0–0.03)
TROPONIN I SERPL DL<=0.01 NG/ML-MCNC: 0.18 NG/ML (ref 0–0.03)
TSH SERPL DL<=0.005 MIU/L-ACNC: 1.36 UIU/ML (ref 0.4–4)
URN SPEC COLLECT METH UR: ABNORMAL
UROBILINOGEN UR STRIP-ACNC: NEGATIVE EU/DL
WBC # BLD AUTO: 17.4 K/UL (ref 3.9–12.7)
WBC #/AREA URNS HPF: 10 /HPF (ref 0–5)
WBC CLUMPS URNS QL MICRO: ABNORMAL
YEAST URNS QL MICRO: ABNORMAL

## 2023-07-17 PROCEDURE — 93010 EKG 12-LEAD: ICD-10-PCS | Mod: 76,,, | Performed by: INTERNAL MEDICINE

## 2023-07-17 PROCEDURE — 83605 ASSAY OF LACTIC ACID: CPT | Mod: 91 | Performed by: EMERGENCY MEDICINE

## 2023-07-17 PROCEDURE — 99900035 HC TECH TIME PER 15 MIN (STAT)

## 2023-07-17 PROCEDURE — 93010 ELECTROCARDIOGRAM REPORT: CPT | Mod: ,,, | Performed by: INTERNAL MEDICINE

## 2023-07-17 PROCEDURE — 93005 ELECTROCARDIOGRAM TRACING: CPT

## 2023-07-17 PROCEDURE — 25000003 PHARM REV CODE 250: Performed by: EMERGENCY MEDICINE

## 2023-07-17 PROCEDURE — 36415 COLL VENOUS BLD VENIPUNCTURE: CPT | Performed by: STUDENT IN AN ORGANIZED HEALTH CARE EDUCATION/TRAINING PROGRAM

## 2023-07-17 PROCEDURE — 84443 ASSAY THYROID STIM HORMONE: CPT | Performed by: EMERGENCY MEDICINE

## 2023-07-17 PROCEDURE — 87040 BLOOD CULTURE FOR BACTERIA: CPT | Performed by: EMERGENCY MEDICINE

## 2023-07-17 PROCEDURE — 85610 PROTHROMBIN TIME: CPT | Performed by: EMERGENCY MEDICINE

## 2023-07-17 PROCEDURE — 80047 BASIC METABLC PNL IONIZED CA: CPT

## 2023-07-17 PROCEDURE — 63600175 PHARM REV CODE 636 W HCPCS: Performed by: EMERGENCY MEDICINE

## 2023-07-17 PROCEDURE — 81000 URINALYSIS NONAUTO W/SCOPE: CPT | Performed by: EMERGENCY MEDICINE

## 2023-07-17 PROCEDURE — 84484 ASSAY OF TROPONIN QUANT: CPT | Performed by: EMERGENCY MEDICINE

## 2023-07-17 PROCEDURE — 96365 THER/PROPH/DIAG IV INF INIT: CPT

## 2023-07-17 PROCEDURE — 99285 EMERGENCY DEPT VISIT HI MDM: CPT | Mod: 25

## 2023-07-17 PROCEDURE — 84484 ASSAY OF TROPONIN QUANT: CPT | Mod: 91 | Performed by: STUDENT IN AN ORGANIZED HEALTH CARE EDUCATION/TRAINING PROGRAM

## 2023-07-17 PROCEDURE — 11000001 HC ACUTE MED/SURG PRIVATE ROOM

## 2023-07-17 PROCEDURE — 93010 ELECTROCARDIOGRAM REPORT: CPT | Mod: 76,,, | Performed by: INTERNAL MEDICINE

## 2023-07-17 PROCEDURE — 83605 ASSAY OF LACTIC ACID: CPT

## 2023-07-17 PROCEDURE — 82962 GLUCOSE BLOOD TEST: CPT

## 2023-07-17 PROCEDURE — 87502 INFLUENZA DNA AMP PROBE: CPT

## 2023-07-17 PROCEDURE — 83735 ASSAY OF MAGNESIUM: CPT | Performed by: EMERGENCY MEDICINE

## 2023-07-17 PROCEDURE — 96361 HYDRATE IV INFUSION ADD-ON: CPT

## 2023-07-17 PROCEDURE — 84145 PROCALCITONIN (PCT): CPT | Performed by: EMERGENCY MEDICINE

## 2023-07-17 PROCEDURE — 25000003 PHARM REV CODE 250: Performed by: STUDENT IN AN ORGANIZED HEALTH CARE EDUCATION/TRAINING PROGRAM

## 2023-07-17 PROCEDURE — 80053 COMPREHEN METABOLIC PANEL: CPT | Performed by: EMERGENCY MEDICINE

## 2023-07-17 PROCEDURE — 87635 SARS-COV-2 COVID-19 AMP PRB: CPT | Performed by: EMERGENCY MEDICINE

## 2023-07-17 PROCEDURE — 82550 ASSAY OF CK (CPK): CPT | Performed by: EMERGENCY MEDICINE

## 2023-07-17 PROCEDURE — 83880 ASSAY OF NATRIURETIC PEPTIDE: CPT | Performed by: EMERGENCY MEDICINE

## 2023-07-17 PROCEDURE — 96366 THER/PROPH/DIAG IV INF ADDON: CPT | Mod: 59

## 2023-07-17 PROCEDURE — 85025 COMPLETE CBC W/AUTO DIFF WBC: CPT | Performed by: EMERGENCY MEDICINE

## 2023-07-17 RX ORDER — INSULIN ASPART 100 [IU]/ML
0-5 INJECTION, SOLUTION INTRAVENOUS; SUBCUTANEOUS
Status: DISCONTINUED | OUTPATIENT
Start: 2023-07-17 | End: 2023-07-25 | Stop reason: HOSPADM

## 2023-07-17 RX ORDER — VALACYCLOVIR HYDROCHLORIDE 500 MG/1
1000 TABLET, FILM COATED ORAL DAILY
Status: DISCONTINUED | OUTPATIENT
Start: 2023-07-18 | End: 2023-07-24

## 2023-07-17 RX ORDER — ALBUTEROL SULFATE 2.5 MG/.5ML
2.5 SOLUTION RESPIRATORY (INHALATION) EVERY 4 HOURS PRN
Status: DISCONTINUED | OUTPATIENT
Start: 2023-07-17 | End: 2023-07-25 | Stop reason: HOSPADM

## 2023-07-17 RX ORDER — TAMSULOSIN HYDROCHLORIDE 0.4 MG/1
0.4 CAPSULE ORAL DAILY
Status: DISCONTINUED | OUTPATIENT
Start: 2023-07-18 | End: 2023-07-25 | Stop reason: HOSPADM

## 2023-07-17 RX ORDER — SILVER SULFADIAZINE 10 G/1000G
1 CREAM TOPICAL
Status: COMPLETED | OUTPATIENT
Start: 2023-07-17 | End: 2023-07-17

## 2023-07-17 RX ORDER — ACETAMINOPHEN 325 MG/1
650 TABLET ORAL EVERY 4 HOURS PRN
Status: DISCONTINUED | OUTPATIENT
Start: 2023-07-17 | End: 2023-07-23

## 2023-07-17 RX ORDER — SODIUM CHLORIDE 0.9 % (FLUSH) 0.9 %
10 SYRINGE (ML) INJECTION EVERY 12 HOURS PRN
Status: DISCONTINUED | OUTPATIENT
Start: 2023-07-17 | End: 2023-07-25 | Stop reason: HOSPADM

## 2023-07-17 RX ORDER — NALOXONE HCL 0.4 MG/ML
0.02 VIAL (ML) INJECTION
Status: DISCONTINUED | OUTPATIENT
Start: 2023-07-17 | End: 2023-07-25 | Stop reason: HOSPADM

## 2023-07-17 RX ORDER — GLUCAGON 1 MG
1 KIT INJECTION
Status: DISCONTINUED | OUTPATIENT
Start: 2023-07-17 | End: 2023-07-25 | Stop reason: HOSPADM

## 2023-07-17 RX ORDER — IBUPROFEN 200 MG
16 TABLET ORAL
Status: DISCONTINUED | OUTPATIENT
Start: 2023-07-17 | End: 2023-07-25 | Stop reason: HOSPADM

## 2023-07-17 RX ORDER — ALBUTEROL SULFATE 90 UG/1
2 AEROSOL, METERED RESPIRATORY (INHALATION) EVERY 4 HOURS PRN
Status: DISCONTINUED | OUTPATIENT
Start: 2023-07-17 | End: 2023-07-17

## 2023-07-17 RX ORDER — AMIODARONE HYDROCHLORIDE 200 MG/1
200 TABLET ORAL DAILY
Status: DISCONTINUED | OUTPATIENT
Start: 2023-07-18 | End: 2023-07-25 | Stop reason: HOSPADM

## 2023-07-17 RX ORDER — TRAMADOL HYDROCHLORIDE 50 MG/1
50 TABLET ORAL EVERY 6 HOURS PRN
Status: DISCONTINUED | OUTPATIENT
Start: 2023-07-17 | End: 2023-07-20

## 2023-07-17 RX ORDER — IBUPROFEN 200 MG
24 TABLET ORAL
Status: DISCONTINUED | OUTPATIENT
Start: 2023-07-17 | End: 2023-07-25 | Stop reason: HOSPADM

## 2023-07-17 RX ORDER — POLYETHYLENE GLYCOL 3350 17 G/17G
17 POWDER, FOR SOLUTION ORAL DAILY
Status: DISCONTINUED | OUTPATIENT
Start: 2023-07-18 | End: 2023-07-25 | Stop reason: HOSPADM

## 2023-07-17 RX ORDER — ACETAMINOPHEN 500 MG
1000 TABLET ORAL
Status: COMPLETED | OUTPATIENT
Start: 2023-07-17 | End: 2023-07-17

## 2023-07-17 RX ORDER — POTASSIUM CHLORIDE 750 MG/1
10 TABLET, EXTENDED RELEASE ORAL DAILY
Status: DISCONTINUED | OUTPATIENT
Start: 2023-07-18 | End: 2023-07-22

## 2023-07-17 RX ORDER — ONDANSETRON 8 MG/1
8 TABLET, ORALLY DISINTEGRATING ORAL EVERY 8 HOURS PRN
Status: DISCONTINUED | OUTPATIENT
Start: 2023-07-17 | End: 2023-07-25 | Stop reason: HOSPADM

## 2023-07-17 RX ORDER — METOPROLOL SUCCINATE 25 MG/1
25 TABLET, EXTENDED RELEASE ORAL DAILY
Status: DISCONTINUED | OUTPATIENT
Start: 2023-07-18 | End: 2023-07-25 | Stop reason: HOSPADM

## 2023-07-17 RX ADMIN — ACETAMINOPHEN 1000 MG: 500 TABLET ORAL at 01:07

## 2023-07-17 RX ADMIN — SILVER SULFADIAZINE 1 TUBE: 10 CREAM TOPICAL at 05:07

## 2023-07-17 RX ADMIN — SODIUM CHLORIDE 1000 ML: 9 INJECTION, SOLUTION INTRAVENOUS at 05:07

## 2023-07-17 RX ADMIN — VANCOMYCIN HYDROCHLORIDE 2000 MG: 10 INJECTION, POWDER, LYOPHILIZED, FOR SOLUTION INTRAVENOUS at 01:07

## 2023-07-17 RX ADMIN — PIPERACILLIN AND TAZOBACTAM 4.5 G: 4; .5 INJECTION, POWDER, LYOPHILIZED, FOR SOLUTION INTRAVENOUS; PARENTERAL at 09:07

## 2023-07-17 RX ADMIN — SODIUM CHLORIDE, POTASSIUM CHLORIDE, SODIUM LACTATE AND CALCIUM CHLORIDE 1000 ML: 600; 310; 30; 20 INJECTION, SOLUTION INTRAVENOUS at 11:07

## 2023-07-17 RX ADMIN — APIXABAN 5 MG: 5 TABLET, FILM COATED ORAL at 09:07

## 2023-07-17 RX ADMIN — PIPERACILLIN AND TAZOBACTAM 4.5 G: 4; .5 INJECTION, POWDER, LYOPHILIZED, FOR SOLUTION INTRAVENOUS; PARENTERAL at 01:07

## 2023-07-17 NOTE — PROGRESS NOTES
Pharmacokinetic Initial Assessment: IV Vancomycin    Assessment/Plan:    Initiate intravenous vancomycin with loading dose of 2000 mg once with subsequent doses when random concentrations are less than 20 mcg/mL  Desired empiric serum trough concentration is 10 to 20 mcg/mL  Draw vancomycin random level on 7/18/23 at 0600.  Pharmacy will continue to follow and monitor vancomycin.      Please contact pharmacy at extension [phone] with any questions regarding this assessment.     Thank you for the consult,   Marilyn Hernandez       Patient brief summary:  Chato Bowie is a 72 y.o. male initiated on antimicrobial therapy with IV Vancomycin for treatment of suspected bacteremia    Drug Allergies:   Review of patient's allergies indicates:  No Known Allergies    Actual Body Weight:   95.3 kg     Renal Function:   Estimated Creatinine Clearance: 14.6 mL/min (A) (based on SCr of 5.4 mg/dL (H)).,     Dialysis Method (if applicable):  N/A    CBC (last 72 hours):  Recent Labs   Lab Result Units 07/17/23  1234   WBC K/uL 17.40*   Hemoglobin g/dL 11.3*   Hematocrit % 34.7*   Platelets K/uL 203   Gran % % 83.7*   Lymph % % 5.3*   Mono % % 9.5   Eosinophil % % 0.1   Basophil % % 0.3   Differential Method  Automated       Metabolic Panel (last 72 hours):  Recent Labs   Lab Result Units 07/17/23  1234 07/17/23  1334   Sodium mmol/L 135*  --    Potassium mmol/L 3.4*  --    Chloride mmol/L 96  --    CO2 mmol/L 24  --    Glucose mg/dL 126*  --    Glucose, UA   --  4+*   BUN mg/dL 114*  --    Creatinine mg/dL 5.4*  --    Albumin g/dL 3.4*  --    Total Bilirubin mg/dL 1.2*  --    Alkaline Phosphatase U/L 68  --    AST U/L 18  --    ALT U/L 18  --    Magnesium mg/dL 2.2  --        Drug levels (last 3 results):  No results for input(s): VANCOMYCINRA, VANCORANDOM, VANCOMYCINPE, VANCOPEAK, VANCOMYCINTR, VANCOTROUGH in the last 72 hours.    Microbiologic Results:  Microbiology Results (last 7 days)       Procedure Component Value Units  Date/Time    Blood culture x two cultures. Draw prior to antibiotics. [[id number]] Collected: 07/17/23 1241    Order Status: Sent Specimen: Blood from Peripheral, Hand, Right Updated: 07/17/23 1251    Blood culture x two cultures. Draw prior to antibiotics. [[id number]] Collected: 07/17/23 1239    Order Status: Sent Specimen: Blood from Peripheral, Antecubital, Left Updated: 07/17/23 1251

## 2023-07-17 NOTE — ASSESSMENT & PLAN NOTE
Patient's FSGs are controlled on current medication regimen.  Last A1c reviewed-   Lab Results   Component Value Date    HGBA1C 7.8 (H) 06/02/2023     Most recent fingerstick glucose reviewed-   Recent Labs   Lab 07/17/23  1234   POCTGLUCOSE 126*     Current correctional scale  Low  Maintain anti-hyperglycemic dose as follows-   Antihyperglycemics (From admission, onward)    Start     Stop Route Frequency Ordered    07/17/23 1738  insulin aspart U-100 pen 0-5 Units         -- SubQ Before meals & nightly PRN 07/17/23 1641        Hold Oral hypoglycemics while patient is in the hospital.

## 2023-07-17 NOTE — ASSESSMENT & PLAN NOTE
This is chronic though patient appears volume depleted in the setting of sepsis.  Continue to monitor.

## 2023-07-17 NOTE — ASSESSMENT & PLAN NOTE
This patient does have evidence of infective focus  My overall impression is sepsis.  Source: left knee vs superimposed cellulitis (shingles).  Suspect source is cellulitis at this time.  Antibiotics given-   Antibiotics (72h ago, onward)    Start     Stop Route Frequency Ordered    07/17/23 1630  silver sulfADIAZINE 1% cream 1 Tube         07/18 0429 Top ED 1 Time 07/17/23 1616    07/17/23 1251  vancomycin - pharmacy to dose  (vancomycin IVPB (PEDS and ADULTS))        See Hyperspace for full Linked Orders Report.    -- IV pharmacy to manage frequency 07/17/23 1152    07/17/23 1230  piperacillin-tazobactam (ZOSYN) 4.5 g in dextrose 5 % in water (D5W) 5 % 100 mL IVPB (MB+)         -- IV Every 8 hours (non-standard times) 07/17/23 1123        Latest lactate reviewed-  Recent Labs   Lab 07/17/23  1234   LACTATE 1.8     Organ dysfunction indicated by Acute kidney injury and Encephalopathy    Fluid challenge given 2 liters NS in ED - not hypotensive    Post- resuscitation assessment Yes Perfusion exam was performed within 6 hours of septic shock presentation after bolus shows Adequate tissue perfusion assessed by non-invasive monitoring       Will Not start Pressors- Levophed for MAP of 65  Source control achieved by: IV antibiotics

## 2023-07-17 NOTE — ASSESSMENT & PLAN NOTE
Chronic issues, particularly left knee.  Will check left knee x-ray to further evaluate for sources of sepsis

## 2023-07-17 NOTE — ASSESSMENT & PLAN NOTE
Presents with  and creatinine of 5.4 with baseline of 2.5-2.8.  BNP 99 when usually patient has BNP greater than 300.  Likely volume depletion in the setting of sepsis and poor p.o. intake.  He was given 2 L normal saline in the emergency department.  Currently with good urine output.  De Leon catheter in place.  Continue to monitor, if no improvement plans for Nephrology consult  Recheck lab work in the morning

## 2023-07-17 NOTE — HPI
Mr. Bowie is a 72-year-old with extensive past medical history including CHF, arthritis, coronary artery disease, diabetes mellitus type 2, hypertension and hyperlipidemia who presents to the emergency department for evaluation of pain and swelling.  Patient was recently diagnosed with shingles and sent home with foul acyclovir.  Patient was found at home unable to get up from his chair and sitting in urine and feces.  Patient is not able to give much history in the emergency department so most of this history was obtained from medical review and providers in the emergency department.    In the emergency department, patient was found to be febrile of 100.8 with initial blood pressure 125/68 saturating 98% on room air.  Lab work revealed a leukocytosis of 17.4, acute on chronic kidney disease with creatinine 5.4  with elevated procalcitonin.  Patient underwent ultrasound of lower extremities which did not reveal DVT.  CT abdomen and pelvis did not show any acute intra-abdominal abnormalities but does note soft tissue edema in the bilateral hip regions.  Patient also found to have concerns for infection of right flank and hip wounds.  Patient admitted to hospital medicine for further management.

## 2023-07-17 NOTE — ASSESSMENT & PLAN NOTE
Diagnosed with shingles and prescribed valacyclovir.  He states that this was picked up but unclear.  Continue renal dose fell acyclovir as well as IV antibiotics for superimposed bacterial infection and source of sepsis.  Precautions noted.  Wound care consult

## 2023-07-17 NOTE — ASSESSMENT & PLAN NOTE
Presents with sepsis and found to have elevated troponin of 0.183. Denies chest pain. ECG reviewed and read as unusual P axis. Has known LBBB. Recent echo with normal EF and grade II diastolic dysfunction. Suspect this is demand in setting of sepsis. Plan to repeat ECG and trend troponin.

## 2023-07-17 NOTE — ED PROVIDER NOTES
Encounter Date: 7/17/2023    SCRIBE #1 NOTE: I, Gloria Beckman, am scribing for, and in the presence of,  Martin Hopper MD. I have scribed the following portions of the note - Other sections scribed: HPI, ROS.     History     Chief Complaint   Patient presents with    Flank Pain     EMS called to 73yo male that is having right flank pain and swelling in his legs. Was seen on thursday at hospital and dr wasn't sure if he had a fungal infection or shingles. Pain and swelling has worsened and he has been unable to get up from his chair. Ems reported that he has been sitting in urine and feces since he was brought home on Thursday. Ems reported an axillary temp of 101.3 and initial bp was 90/50     Chato Bowie is a 72 y.o. male, with a PMHx of acute CHF, CAD, Type-2 DM, HLD, HTN, atrial flutter, chronic kidney disease stage IV, who presents to the ED with R flank and buttock pain and rash. Additional history is provided by independent historian: EMS, who states that pt was treated at this ED 4 days ago for similar symptoms, but it was unclear whether pt had shingles or a fungal infection. EMS reports that pt has been sitting in urine and feces since he was brought home on Thursday after his ED visit. EMS reported an axillary temp of 101.3 and initial BP was 90/50 PTA. Pt states that pain has gotten worse, and that he cannot move out of his chair at home. Pt also states he is having pain and swelling in his lower extremities, as well as pain in his lower right abdomen over a mass that he does not know the origin of. No other exacerbating or alleviating factors. Denies cough, shortness of breath, chest pain, fever, chills, nausea, vomiting, diarrhea, dysuria, headaches, congestion, sore throat, arm trouble, eye pain, ear pain, or other associated symptoms.    The history is provided by the patient and the EMS personnel. The history is limited by the condition of the patient. No  was used.    Review of patient's allergies indicates:  No Known Allergies  Past Medical History:   Diagnosis Date    Acute congestive heart failure 3/20/2020    Alcohol abuse     Arthritis     Asthma attack 11/24/2021    Coronary artery disease     Diabetes mellitus     Hyperlipemia     Hypertension     Multiple thyroid nodules 6/14/2023    Rhabdomyolysis      Past Surgical History:   Procedure Laterality Date    EYE SURGERY      TRANSESOPHAGEAL ECHOCARDIOGRAM WITH POSSIBLE CARDIOVERSION (MARIA W/ POSS CARDIOVERSION) N/A 1/5/2023    Procedure: Transesophageal echo (MARIA) intra-procedure log documentation;  Surgeon: Indra Foote MD;  Location: [facility] CATH LAB;  Service: Cardiology;  Laterality: N/A;    TREATMENT OF CARDIAC ARRHYTHMIA N/A 12/7/2020    Procedure: Cardioversion or Defibrillation;  Surgeon: Indra Foote MD;  Location: [facility] CATH LAB;  Service: Cardiology;  Laterality: N/A;    TREATMENT OF CARDIAC ARRHYTHMIA N/A 12/30/2020    Procedure: Cardioversion or Defibrillation;  Surgeon: Tyrone Steen MD;  Location: [facility] CATH LAB;  Service: Cardiology;  Laterality: N/A;    TREATMENT OF CARDIAC ARRHYTHMIA N/A 1/5/2023    Procedure: Cardioversion or Defibrillation;  Surgeon: Indra Foote MD;  Location: [facility] CATH LAB;  Service: Cardiology;  Laterality: N/A;    VASCULAR SURGERY       Family History   Problem Relation Age of Onset    Hypertension Mother     Diabetes Mother     Diabetes Father     Hypertension Father     Diabetes Sister     Hypertension Sister      Social History     Tobacco Use    Smoking status: Never    Smokeless tobacco: Never   Substance Use Topics    Alcohol use: Not Currently     Alcohol/week: 6.0 standard drinks     Types: 6 Cans of beer per week     Comment: daily    Drug use: No     Review of Systems   Constitutional:  Positive for fever. Negative for chills and diaphoresis.   HENT:  Negative for ear pain and sore throat.    Eyes:  Negative for pain.   Respiratory:  Negative for cough  and shortness of breath.    Cardiovascular:  Positive for leg swelling (and pain). Negative for chest pain.   Gastrointestinal:  Positive for abdominal pain (over mass in lower R area). Negative for diarrhea, nausea and vomiting.   Genitourinary:  Positive for flank pain. Negative for dysuria.   Musculoskeletal:  Negative for back pain.        (-) Arm or leg trouble.    Skin:  Positive for rash.   Neurological:  Negative for headaches.   Psychiatric/Behavioral:  Negative for confusion.      Physical Exam     Initial Vitals [07/17/23 1106]   BP Pulse Resp Temp SpO2   125/68 84 20 97.7 °F (36.5 °C) 98 %      MAP       --         Physical Exam  The patient was examined specifically for the following:   General:No significant distress, Good color, Warm and dry. Head and neck:Scalp atraumatic, Neck supple. Neurological:Appropriate conversation, Gross motor deficits. Eyes:Conjugate gaze, Clear corneas. ENT: No epistaxis. Cardiac: Regular rate and rhythm, Grossly normal heart tones. Pulmonary: Wheezing, Rales. Gastrointestinal: Abdominal tenderness, Abdominal distention. Musculoskeletal: Extremity deformity, Apparent pain with range of motion of the joints. Skin: Rash.   The findings on examination were normal except for the following:  The patient has a crusted rash in a dermatomal distribution in the right lower flank the right hip and the right medial thigh.  He has marked pitting edema of both lower extremities.  The lungs are clear.  The heart tones are normal.  The neck is supple.  The patient is conversational and appropriate.  The lungs are clear.  The heart tones are normal.  There is no clinical evidence of respiratory distress.  After fluid resuscitation by EMS the blood pressure is 125/68.   ED Course   Critical Care    Date/Time: 8/9/2023 3:28 PM    Performed by: Martin Hopper MD  Authorized by: Abhijeet Veloz MD  Direct patient critical care time: 22 minutes  Additional history critical care time: 11  minutes  Ordering / reviewing critical care time: 11 minutes  Documentation critical care time: 11 minutes  Consulting other physicians critical care time: 5 minutes  Total critical care time (exclusive of procedural time) : 60 minutes  Critical care time was exclusive of separately billable procedures and treating other patients and teaching time.  Critical care was necessary to treat or prevent imminent or life-threatening deterioration of the following conditions: sepsis.  Critical care was time spent personally by me on the following activities: development of treatment plan with patient or surrogate, discussions with primary provider, evaluation of patient's response to treatment, examination of patient, obtaining history from patient or surrogate, ordering and performing treatments and interventions, ordering and review of laboratory studies, ordering and review of radiographic studies, pulse oximetry, re-evaluation of patient's condition and review of old charts.        Labs Reviewed   CBC W/ AUTO DIFFERENTIAL - Abnormal; Notable for the following components:       Result Value    WBC 17.40 (*)     RBC 4.25 (*)     Hemoglobin 11.3 (*)     Hematocrit 34.7 (*)     MCH 26.6 (*)     MPV 8.9 (*)     Immature Granulocytes 1.1 (*)     Gran # (ANC) 14.6 (*)     Immature Grans (Abs) 0.19 (*)     Lymph # 0.9 (*)     Mono # 1.7 (*)     Gran % 83.7 (*)     Lymph % 5.3 (*)     All other components within normal limits   COMPREHENSIVE METABOLIC PANEL - Abnormal; Notable for the following components:    Sodium 135 (*)     Potassium 3.4 (*)     Glucose 126 (*)      (*)     Creatinine 5.4 (*)     Albumin 3.4 (*)     Total Bilirubin 1.2 (*)     eGFR 11 (*)     All other components within normal limits   URINALYSIS, REFLEX TO URINE CULTURE - Abnormal; Notable for the following components:    Protein, UA 1+ (*)     Glucose, UA 4+ (*)     Occult Blood UA 1+ (*)     All other components within normal limits    Narrative:      Specimen Source->Urine   PROCALCITONIN - Abnormal; Notable for the following components:    Procalcitonin 0.37 (*)     All other components within normal limits   TROPONIN I - Abnormal; Notable for the following components:    Troponin I 0.183 (*)     All other components within normal limits   CK - Abnormal; Notable for the following components:     (*)     All other components within normal limits   URINALYSIS MICROSCOPIC - Abnormal; Notable for the following components:    RBC, UA 17 (*)     WBC, UA 10 (*)     WBC Clumps, UA Occasional (*)     Hyaline Casts, UA 16 (*)     All other components within normal limits    Narrative:     Specimen Source->Urine   ISTAT PROCEDURE - Abnormal; Notable for the following components:    POC Glucose 127 (*)     POC  (*)     POC Creatinine 5.9 (*)     POC Sodium 134 (*)     POC Potassium 3.3 (*)     POC Anion Gap 19 (*)     POC Hematocrit 35 (*)     All other components within normal limits   POCT GLUCOSE - Abnormal; Notable for the following components:    POCT Glucose 126 (*)     All other components within normal limits   POCT GLUCOSE - Abnormal; Notable for the following components:    POCT Glucose 175 (*)     All other components within normal limits   CULTURE, BLOOD    Narrative:     Aerobic and anaerobic   CULTURE, BLOOD    Narrative:     Aerobic and anaerobic   LACTIC ACID, PLASMA   LACTIC ACID, PLASMA   B-TYPE NATRIURETIC PEPTIDE   PROTIME-INR   MAGNESIUM   TSH   TROPONIN I   POCT INFLUENZA A/B MOLECULAR   SARS-COV-2 RDRP GENE   POCT GLUCOSE, HAND-HELD DEVICE   ISTAT LACTATE   POCT GLUCOSE MONITORING CONTINUOUS          Imaging Results              CT Head Without Contrast (Final result)  Result time 07/17/23 19:03:22      Final result by Mine Dasilva MD (07/17/23 19:03:22)                   Impression:      No acute intracranial abnormalities identified.  No significant detrimental change compared to recent CT head 06/01/2023.      Electronically  signed by: Mine Dasilva MD  Date:    07/17/2023  Time:    19:03               Narrative:    EXAMINATION:  CT HEAD WITHOUT CONTRAST    CLINICAL HISTORY:  Mental status change, unknown cause;    TECHNIQUE:  Low dose axial images were obtained through the head.  Coronal and sagittal reformations were also performed. Contrast was not administered.    COMPARISON:  06/01/2023.    FINDINGS:  There is generalized cerebral volume loss with extensive chronic microvascular ischemic disease.  Small region of remote infarction with encephalomalacia is seen in the left frontal lobe.  No evidence of acute/recent major vascular distribution cerebral infarction, intraparenchymal hemorrhage, or intra-axial space occupying lesion. The ventricular system is normal in size and configuration with no evidence of hydrocephalus. No effacement of the skull-base cisterns. No abnormal extra-axial fluid collections or blood products. Visualized paranasal sinuses and mastoid air cells are clear. The calvarium shows no significant abnormality.                                       X-Ray Knee 1 or 2 View Left (Final result)  Result time 07/17/23 18:34:51      Final result by Mine Dasilva MD (07/17/23 18:34:51)                   Impression:      No acute osseous abnormality identified.  Additional findings as detailed above.  No significant change.      Electronically signed by: Mine Dasilva MD  Date:    07/17/2023  Time:    18:34               Narrative:    EXAMINATION:  XR KNEE 1 OR 2 VIEW LEFT    CLINICAL HISTORY:  swelling, pain;    TECHNIQUE:  AP and lateral views of the left knee were performed.    COMPARISON:  07/05/2023.    FINDINGS:  No evidence of acute displaced fracture, dislocation, or osseous destructive process.  Severe joint space narrowing is seen at the medial tibiofemoral compartment.  There is significant suprapatellar joint effusion.  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   Surrounding soft tissue edema is seen.                                       CT Abdomen Pelvis  Without Contrast (Final result)  Result time 07/17/23 17:30:31      Final result by Mine Dasilva MD (07/17/23 17:30:31)                   Impression:      1. No acute intra-abdominal abnormalities identified.  2. Cholelithiasis with suspected gallbladder sludge.  3. Additional findings as detailed above.      Electronically signed by: Mine Dasilva MD  Date:    07/17/2023  Time:    17:30               Narrative:    EXAMINATION:  CT ABDOMEN PELVIS WITHOUT CONTRAST    CLINICAL HISTORY:  RLQ abdominal pain (Age >= 14y);    TECHNIQUE:  Low dose axial images, sagittal and coronal reformations were obtained from the lung bases to the pubic symphysis.  Oral contrast was not administered.    COMPARISON:  CT abdomen pelvis 06/26/2023.    FINDINGS:  Heart is mildly enlarged with coronary artery and aortic valve calcification seen.  The lung bases are clear.    Small calcified granulomas are seen within the liver and spleen.  There is no intra-or extrahepatic biliary ductal dilatation.  There is cholelithiasis with hyperdense layering material seen within the gallbladder which may represent hyperdense sludge.  Stomach, pancreas, and adrenal glands are unremarkable.    Kidneys show no evidence of stones or hydronephrosis. Ureters are unremarkable along their courses.  Urinary bladder is collapsed around a De Leon catheter.  Prostate is small in size or has been removed.    No evidence of appendicitis.  Moderate volume retained stool is seen within the colon.  The visualized loops of small and large bowel show no evidence of obstruction or inflammation.  No free air or free fluid.    Aorta tapers normally with mild atherosclerosis.    No acute osseous abnormality identified.  Multilevel degenerative changes are seen within the spine.  Nonspecific subcutaneous soft tissue edema is seen involving the bilateral hip regions.                                        US Lower Extremity Veins Bilateral (Final result)  Result time 07/17/23 16:31:14      Final result by Elbert Nunez MD (07/17/23 16:31:14)                   Impression:      No evidence of deep venous thrombosis in either lower extremity.      Electronically signed by: Elbert Nunez  Date:    07/17/2023  Time:    16:31               Narrative:    EXAMINATION:  US LOWER EXTREMITY VEINS BILATERAL    CLINICAL HISTORY:  Other specified soft tissue disorders    TECHNIQUE:  Duplex and color flow Doppler and dynamic compression was performed of the bilateral lower extremity veins was performed.    COMPARISON:  None    FINDINGS:  Right thigh veins: The common femoral, femoral, popliteal, upper greater saphenous, and deep femoral veins are patent and free of thrombus. The veins are normally compressible and have normal phasic flow and augmentation response.    Right calf veins: The visualized calf veins are patent.    Left thigh veins: The common femoral, femoral, popliteal, upper greater saphenous, and deep femoral veins are patent and free of thrombus. The veins are normally compressible and have normal phasic flow and augmentation response.    Left calf veins: The visualized calf veins are patent.    Miscellaneous: None                                       X-Ray Chest AP Portable (Final result)  Result time 07/17/23 12:57:52      Final result by Martin Thorpe MD (07/17/23 12:57:52)                   Impression:      See above      Electronically signed by: Martin Thorpe MD  Date:    07/17/2023  Time:    12:57               Narrative:    EXAMINATION:  XR CHEST AP PORTABLE    CLINICAL HISTORY:  Sepsis;    TECHNIQUE:  Single frontal view of the chest was performed.    COMPARISON:  No 07/05/2023 ne    FINDINGS:  Heart size is normal..  Lungs are clear.  No pleural effusion                                       Medications   piperacillin-tazobactam (ZOSYN) 4.5 g in dextrose 5 % in water (D5W)  5 % 100 mL IVPB (MB+) (0 g Intravenous Stopped 7/17/23 1647)   vancomycin - pharmacy to dose (has no administration in time range)   Tdap (BOOSTRIX) vaccine injection 0.5 mL (has no administration in time range)   amiodarone tablet 200 mg (has no administration in time range)   apixaban tablet 5 mg (has no administration in time range)   metoprolol succinate (TOPROL-XL) 24 hr tablet 25 mg (has no administration in time range)   potassium chloride CR tablet 10 mEq (has no administration in time range)   tamsulosin 24 hr capsule 0.4 mg (has no administration in time range)   valACYclovir tablet 1,000 mg (has no administration in time range)   sodium chloride 0.9% flush 10 mL (has no administration in time range)   naloxone 0.4 mg/mL injection 0.02 mg (has no administration in time range)   glucose chewable tablet 16 g (has no administration in time range)   glucose chewable tablet 24 g (has no administration in time range)   glucagon (human recombinant) injection 1 mg (has no administration in time range)   dextrose 10% bolus 125 mL 125 mL (has no administration in time range)   dextrose 10% bolus 250 mL 250 mL (has no administration in time range)   acetaminophen tablet 650 mg (has no administration in time range)   polyethylene glycol packet 17 g (has no administration in time range)   ondansetron disintegrating tablet 8 mg (has no administration in time range)   insulin aspart U-100 pen 0-5 Units (has no administration in time range)   traMADoL tablet 50 mg (has no administration in time range)   albuterol sulfate nebulizer solution 2.5 mg (has no administration in time range)   lactated ringers bolus 1,000 mL (0 mLs Intravenous Stopped 7/17/23 1647)   vancomycin (VANCOCIN) 2,000 mg in dextrose 5 % (D5W) 500 mL IVPB (0 mg Intravenous Stopped 7/17/23 1647)   acetaminophen tablet 1,000 mg (1,000 mg Oral Given 7/17/23 1346)   sodium chloride 0.9% bolus 1,000 mL 1,000 mL (1,000 mLs Intravenous New Bag 7/17/23 6014)    silver sulfADIAZINE 1% cream 1 Tube (1 Tube Topical (Top) Given 7/17/23 8201)     Medical Decision Making:   History:   Old Medical Records: I decided to obtain old medical records.  Clinical Tests:   Lab Tests: Ordered and Reviewed  Radiological Study: Ordered and Reviewed     Given the above this patient presents emergency room with right flank and gluteal pain.  It is believe that patient has a history of shingles.  He has been unable to move in his been in urine and feces in a chair in his house since he was discharged 4 days ago.  He returns with a fever.  The sepsis protocol was executed.  No focus was identified except the rash which may have a bacterial superinfection.  CT of the abdomen fails to reveal significant abnormalities.  CT of the head fails to reveal significant abnormalities.  The patient was awake alert and bright chest x-ray is negative for pneumothorax pneumonia pleural effusion.  The urinalysis fails to reveal evidence of infection.  The patient responded well to fluids IV antibiotics in the emergency room.  Consultation was obtained with Dr. Evans.  He asked for CT of the abdomen.  This was performed.  It was unremarkable.  I will clean the rash and treat with Silvadene.  I will continue treatment with Zosyn and vancomycin.  The patient had a leukocytosis at 17,000 confirming infection.  The patient had an elevated procalcitonin confirming infection.  He has a mild anemia that is not clinically significant.  His platelet count is normal.  He has a slightly low potassium, not likely significant.  The patient's BUN is 114 his creatinine is 5.4 this suggest severe dehydration.  At the same time the patient has significant edema in both lower extremities.  He has a troponin of 0.183 raising questions about myocardial infarction.  His CPK is 649.  This is a little low for rhabdomyolysis.  His bilirubin is slightly up the significance of this is not clear.  He does have gallbladder sludge on  his CT scan.    A reperfusion evaluation was performed at 4:30 p.m..    Fluids were limited because of concerns for significant peripheral edema.  There was no hypotension.  I will defer further fluid orders to hospital medicine.       Scribe Attestation:   Scribe #1: I performed the above scribed service and the documentation accurately describes the services I performed. I attest to the accuracy of the note.                 I personally performed the services described in this documentation.  All medical record  entries made by the scribe are at my direction and in my presence.  Signed, Dr. Hopper    Clinical Impression:   Final diagnoses:  [R53.1] Generalized weakness  [M79.89] Leg swelling  [R77.8] Elevated troponin  [R50.9] Fever, unspecified fever cause (Primary)  [I95.9] Hypotension, unspecified hypotension type  [A41.9] Sepsis, due to unspecified organism, unspecified whether acute organ dysfunction present  [E86.0] Dehydration  [B02.8] Herpes zoster with other complication  [R79.89] Elevated brain natriuretic peptide (BNP) level        ED Disposition Condition    Admit                 Martin Hopper MD  07/17/23 1931       Martin Hopper MD  08/09/23 6743

## 2023-07-17 NOTE — PROGRESS NOTES
Pharmacokinetic Initial Assessment: IV Vancomycin    Assessment/Plan:    Initiate intravenous vancomycin with loading dose of 2000 mg once followed by a maintenance dose of vancomycin 1250mg IV every 24 hours  Desired empiric serum trough concentration is 15 to 20 mcg/mL  Draw vancomycin trough level 60 min prior to third dose on 07/19 at approximately 1200  Pharmacy will continue to follow and monitor vancomycin.      Please contact pharmacy at extension [phone] with any questions regarding this assessment.     Thank you for the consult,   Melania Hernandez       Patient brief summary:  Chato Bowie is a 72 y.o. male initiated on antimicrobial therapy with IV Vancomycin for treatment of suspected bacteremia    Drug Allergies:   Review of patient's allergies indicates:  No Known Allergies    Actual Body Weight:   95.3    Renal Function:   Estimated Creatinine Clearance: 30.3 mL/min (A) (based on SCr of 2.6 mg/dL (H)).,     Dialysis Method (if applicable):  N/A    CBC (last 72 hours):  No results for input(s): WHITE BLOOD CELL COUNT, HEMOGLOBIN, HEMATOCRIT, PLATELETS, GRAN%, LYMPH%, MONO%, EOSINOPHIL%, BASOPHIL%, DIFFERENTIAL METHOD in the last 72 hours.    Metabolic Panel (last 72 hours):  No results for input(s): SODIUM, POTASSIUM, CHLORIDE, CO2, GLUCOSE, BUN BLD, CREATININE, ALBUMIN, BILIRUBIN TOTAL, ALK PHOS, AST, ALT, MAGNESIUM, PHOSPHORUS in the last 72 hours.    Drug levels (last 3 results):  No results for input(s): VANCOMYCINRA, VANCORANDOM, VANCOMYCINPE, VANCOPEAK, VANCOMYCINTR, VANCOTROUGH in the last 72 hours.    Microbiologic Results:  Microbiology Results (last 7 days)       Procedure Component Value Units Date/Time    Blood culture x two cultures. Draw prior to antibiotics. [[id number]]     Order Status: Sent Specimen: Blood     Blood culture x two cultures. Draw prior to antibiotics. [[id number]]     Order Status: Sent Specimen: Blood

## 2023-07-17 NOTE — ASSESSMENT & PLAN NOTE
This is chronic, left knee appears swollen but did not appear erythematous or warm.  Will check left knee x-ray for any evidence of septic joint.

## 2023-07-17 NOTE — SUBJECTIVE & OBJECTIVE
Past Medical History:   Diagnosis Date    Acute congestive heart failure 3/20/2020    Alcohol abuse     Arthritis     Asthma attack 11/24/2021    Coronary artery disease     Diabetes mellitus     Hyperlipemia     Hypertension     Multiple thyroid nodules 6/14/2023    Rhabdomyolysis        Past Surgical History:   Procedure Laterality Date    EYE SURGERY      TRANSESOPHAGEAL ECHOCARDIOGRAM WITH POSSIBLE CARDIOVERSION (MARIA W/ POSS CARDIOVERSION) N/A 1/5/2023    Procedure: Transesophageal echo (MARIA) intra-procedure log documentation;  Surgeon: Indra Foote MD;  Location: [facility] CATH LAB;  Service: Cardiology;  Laterality: N/A;    TREATMENT OF CARDIAC ARRHYTHMIA N/A 12/7/2020    Procedure: Cardioversion or Defibrillation;  Surgeon: Indra Foote MD;  Location: [facility] CATH LAB;  Service: Cardiology;  Laterality: N/A;    TREATMENT OF CARDIAC ARRHYTHMIA N/A 12/30/2020    Procedure: Cardioversion or Defibrillation;  Surgeon: Tyrone Steen MD;  Location: [facility] CATH LAB;  Service: Cardiology;  Laterality: N/A;    TREATMENT OF CARDIAC ARRHYTHMIA N/A 1/5/2023    Procedure: Cardioversion or Defibrillation;  Surgeon: Indra Foote MD;  Location: [facility] CATH LAB;  Service: Cardiology;  Laterality: N/A;    VASCULAR SURGERY         Review of patient's allergies indicates:  No Known Allergies    No current facility-administered medications on file prior to encounter.     Current Outpatient Medications on File Prior to Encounter   Medication Sig    albuterol (PROVENTIL/VENTOLIN HFA) 90 mcg/actuation inhaler Inhale 2 puffs into the lungs every 6 (six) hours as needed for Wheezing or Shortness of Breath.    amiodarone (PACERONE) 200 MG Tab Take 200 mg by mouth once daily.    diclofenac sodium (VOLTAREN) 1 % Gel Apply 2 g topically 2 (two) times daily as needed (pain).    ELIQUIS 5 mg Tab Take 5 mg by mouth 2 (two) times daily.    furosemide (LASIX) 40 MG tablet Take 1 tablet (40 mg total) by mouth 2 (two) times daily.     gabapentin (NEURONTIN) 300 MG capsule Take 1 capsule (300 mg total) by mouth 3 (three) times daily.    hydrALAZINE (APRESOLINE) 50 MG tablet Take 1.5 tablets (75 mg total) by mouth 3 (three) times daily.    insulin aspart U-100 (NOVOLOG) 100 unit/mL (3 mL) InPn pen Inject 5 Units into the skin 3 (three) times daily.    losartan (COZAAR) 50 MG tablet Take 50 mg by mouth once daily.    magnesium oxide (MAG-OX) 400 mg (241.3 mg magnesium) tablet Take 800 mg by mouth.    metOLazone (ZAROXOLYN) 5 MG tablet Take 5 mg by mouth 3 (three) times a week.    metoprolol succinate (TOPROL-XL) 25 MG 24 hr tablet Take 25 mg by mouth once daily.    NOVOLIN 70/30 U-100 INSULIN 100 unit/mL (70-30) injection Inject 55 Units into the skin 2 (two) times daily.    potassium chloride (KLOR-CON) 10 MEQ TbSR Take 10 mEq by mouth once daily.    rosuvastatin (CRESTOR) 40 MG Tab Take 40 mg by mouth every evening.    sildenafiL (VIAGRA) 100 MG tablet TAKE 1 TABLET BY MOUTH ONCE DAILY AS NEEDED FOR ERECTILE DYSFUNCTION.    tamsulosin (FLOMAX) 0.4 mg Cap Take 1 capsule (0.4 mg total) by mouth once daily.    traMADoL (ULTRAM) 50 mg tablet Take 1 tablet (50 mg total) by mouth every 12 (twelve) hours as needed for Pain.    valACYclovir (VALTREX) 1000 MG tablet Take 1 tablet (1,000 mg total) by mouth 3 (three) times daily. for 7 days     Family History       Problem Relation (Age of Onset)    Diabetes Mother, Father, Sister    Hypertension Mother, Father, Sister          Tobacco Use    Smoking status: Never    Smokeless tobacco: Never   Substance and Sexual Activity    Alcohol use: Not Currently     Alcohol/week: 6.0 standard drinks     Types: 6 Cans of beer per week     Comment: daily    Drug use: No    Sexual activity: Yes     Partners: Female     Review of Systems  Objective:     Vital Signs (Most Recent):  Temp: (!) 100.8 °F (38.2 °C) (07/17/23 1142)  Pulse: 75 (07/17/23 1643)  Resp: 19 (07/17/23 1643)  BP: (!) 171/73 (07/17/23 1618)  SpO2: 99 %  (07/17/23 1643) Vital Signs (24h Range):  Temp:  [97.7 °F (36.5 °C)-100.8 °F (38.2 °C)] 100.8 °F (38.2 °C)  Pulse:  [75-84] 75  Resp:  [19-35] 19  SpO2:  [93 %-100 %] 99 %  BP: (125-171)/(66-74) 171/73     Weight: 95.3 kg (210 lb)  Body mass index is 29.29 kg/m².     Physical Exam  Vitals and nursing note reviewed.   Constitutional:       General: He is not in acute distress.     Appearance: He is obese. He is ill-appearing.   Cardiovascular:      Rate and Rhythm: Normal rate and regular rhythm.      Pulses: Normal pulses.      Heart sounds: No murmur heard.  Pulmonary:      Effort: Pulmonary effort is normal. No respiratory distress.      Breath sounds: No wheezing.   Abdominal:      General: Bowel sounds are normal. There is no distension.      Tenderness: There is no abdominal tenderness.   Musculoskeletal:         General: Swelling present.   Skin:     Comments: Noted lesion to right heel, right lateral calf as well as skin lesions to the right flank.  See pictures below.   Neurological:      Mental Status: He is alert and oriented to person, place, and time.                    Significant Labs: All pertinent labs within the past 24 hours have been reviewed.    Significant Imaging: I have reviewed all pertinent imaging results/findings within the past 24 hours.

## 2023-07-17 NOTE — H&P
[facility] Medicine  History & Physical    Patient Name: Chato Bowie  MRN: [id number]  Patient Class: IP- Inpatient  Admission Date: 7/17/2023  Attending Physician: Jose L Evans III, MD   Primary Care Provider: [facility]         Patient information was obtained from patient, past medical records and ER records.     Subjective:     Principal Problem:Sepsis    Chief Complaint:   Chief Complaint   Patient presents with    Flank Pain     EMS called to 71yo male that is having right flank pain and swelling in his legs. Was seen on thursday at hospital and dr wasn't sure if he had a fungal infection or shingles. Pain and swelling has worsened and he has been unable to get up from his chair. Ems reported that he has been sitting in urine and feces since he was brought home on Thursday. Ems reported an axillary temp of 101.3 and initial bp was 90/50        HPI: Mr. Bowie is a 72-year-old with extensive past medical history including CHF, arthritis, coronary artery disease, diabetes mellitus type 2, hypertension and hyperlipidemia who presents to the emergency department for evaluation of pain and swelling.  Patient was recently diagnosed with shingles and sent home with foul acyclovir.  Patient was found at home unable to get up from his chair and sitting in urine and feces.  Patient is not able to give much history in the emergency department so most of this history was obtained from medical review and providers in the emergency department.    In the emergency department, patient was found to be febrile of 100.8 with initial blood pressure 125/68 saturating 98% on room air.  Lab work revealed a leukocytosis of 17.4, acute on chronic kidney disease with creatinine 5.4  with elevated procalcitonin.  Patient underwent ultrasound of lower extremities which did not reveal DVT.  CT abdomen and pelvis did not show any acute intra-abdominal abnormalities but does note soft tissue  edema in the bilateral hip regions.  Patient also found to have concerns for infection of right flank and hip wounds.  Patient admitted to hospital medicine for further management.        Past Medical History:   Diagnosis Date    Acute congestive heart failure 3/20/2020    Alcohol abuse     Arthritis     Asthma attack 11/24/2021    Coronary artery disease     Diabetes mellitus     Hyperlipemia     Hypertension     Multiple thyroid nodules 6/14/2023    Rhabdomyolysis        Past Surgical History:   Procedure Laterality Date    EYE SURGERY      TRANSESOPHAGEAL ECHOCARDIOGRAM WITH POSSIBLE CARDIOVERSION (MARIA W/ POSS CARDIOVERSION) N/A 1/5/2023    Procedure: Transesophageal echo (MARIA) intra-procedure log documentation;  Surgeon: Indra Foote MD;  Location: [facility] CATH LAB;  Service: Cardiology;  Laterality: N/A;    TREATMENT OF CARDIAC ARRHYTHMIA N/A 12/7/2020    Procedure: Cardioversion or Defibrillation;  Surgeon: Indra Foote MD;  Location: [facility] CATH LAB;  Service: Cardiology;  Laterality: N/A;    TREATMENT OF CARDIAC ARRHYTHMIA N/A 12/30/2020    Procedure: Cardioversion or Defibrillation;  Surgeon: Tyrone Steen MD;  Location: [facility] CATH LAB;  Service: Cardiology;  Laterality: N/A;    TREATMENT OF CARDIAC ARRHYTHMIA N/A 1/5/2023    Procedure: Cardioversion or Defibrillation;  Surgeon: Indra Foote MD;  Location: [facility] CATH LAB;  Service: Cardiology;  Laterality: N/A;    VASCULAR SURGERY         Review of patient's allergies indicates:  No Known Allergies    No current facility-administered medications on file prior to encounter.     Current Outpatient Medications on File Prior to Encounter   Medication Sig    albuterol (PROVENTIL/VENTOLIN HFA) 90 mcg/actuation inhaler Inhale 2 puffs into the lungs every 6 (six) hours as needed for Wheezing or Shortness of Breath.    amiodarone (PACERONE) 200 MG Tab Take 200 mg by mouth once daily.    diclofenac sodium (VOLTAREN) 1 % Gel Apply 2  g topically 2 (two) times daily as needed (pain).    ELIQUIS 5 mg Tab Take 5 mg by mouth 2 (two) times daily.    furosemide (LASIX) 40 MG tablet Take 1 tablet (40 mg total) by mouth 2 (two) times daily.    gabapentin (NEURONTIN) 300 MG capsule Take 1 capsule (300 mg total) by mouth 3 (three) times daily.    hydrALAZINE (APRESOLINE) 50 MG tablet Take 1.5 tablets (75 mg total) by mouth 3 (three) times daily.    insulin aspart U-100 (NOVOLOG) 100 unit/mL (3 mL) InPn pen Inject 5 Units into the skin 3 (three) times daily.    losartan (COZAAR) 50 MG tablet Take 50 mg by mouth once daily.    magnesium oxide (MAG-OX) 400 mg (241.3 mg magnesium) tablet Take 800 mg by mouth.    metOLazone (ZAROXOLYN) 5 MG tablet Take 5 mg by mouth 3 (three) times a week.    metoprolol succinate (TOPROL-XL) 25 MG 24 hr tablet Take 25 mg by mouth once daily.    NOVOLIN 70/30 U-100 INSULIN 100 unit/mL (70-30) injection Inject 55 Units into the skin 2 (two) times daily.    potassium chloride (KLOR-CON) 10 MEQ TbSR Take 10 mEq by mouth once daily.    rosuvastatin (CRESTOR) 40 MG Tab Take 40 mg by mouth every evening.    sildenafiL (VIAGRA) 100 MG tablet TAKE 1 TABLET BY MOUTH ONCE DAILY AS NEEDED FOR ERECTILE DYSFUNCTION.    tamsulosin (FLOMAX) 0.4 mg Cap Take 1 capsule (0.4 mg total) by mouth once daily.    traMADoL (ULTRAM) 50 mg tablet Take 1 tablet (50 mg total) by mouth every 12 (twelve) hours as needed for Pain.    valACYclovir (VALTREX) 1000 MG tablet Take 1 tablet (1,000 mg total) by mouth 3 (three) times daily. for 7 days     Family History       Problem Relation (Age of Onset)    Diabetes Mother, Father, Sister    Hypertension Mother, Father, Sister          Tobacco Use    Smoking status: Never    Smokeless tobacco: Never   Substance and Sexual Activity    Alcohol use: Not Currently     Alcohol/week: 6.0 standard drinks     Types: 6 Cans of beer per week     Comment: daily    Drug use: No    Sexual activity: Yes      Partners: Female     Review of Systems  Objective:     Vital Signs (Most Recent):  Temp: (!) 100.8 °F (38.2 °C) (07/17/23 1142)  Pulse: 75 (07/17/23 1643)  Resp: 19 (07/17/23 1643)  BP: (!) 171/73 (07/17/23 1618)  SpO2: 99 % (07/17/23 1643) Vital Signs (24h Range):  Temp:  [97.7 °F (36.5 °C)-100.8 °F (38.2 °C)] 100.8 °F (38.2 °C)  Pulse:  [75-84] 75  Resp:  [19-35] 19  SpO2:  [93 %-100 %] 99 %  BP: (125-171)/(66-74) 171/73     Weight: 95.3 kg (210 lb)  Body mass index is 29.29 kg/m².     Physical Exam  Vitals and nursing note reviewed.   Constitutional:       General: He is not in acute distress.     Appearance: He is obese. He is ill-appearing.   Cardiovascular:      Rate and Rhythm: Normal rate and regular rhythm.      Pulses: Normal pulses.      Heart sounds: No murmur heard.  Pulmonary:      Effort: Pulmonary effort is normal. No respiratory distress.      Breath sounds: No wheezing.   Abdominal:      General: Bowel sounds are normal. There is no distension.      Tenderness: There is no abdominal tenderness.   Musculoskeletal:         General: Swelling present.   Skin:     Comments: Noted lesion to right heel, right lateral calf as well as skin lesions to the right flank.  See pictures below.   Neurological:      Mental Status: He is alert and oriented to person, place, and time.                    Significant Labs: All pertinent labs within the past 24 hours have been reviewed.    Significant Imaging: I have reviewed all pertinent imaging results/findings within the past 24 hours.    Assessment/Plan:     * Sepsis  This patient does have evidence of infective focus  My overall impression is sepsis.  Source: left knee vs superimposed cellulitis (shingles).  Suspect source is cellulitis at this time.  Antibiotics given-   Antibiotics (72h ago, onward)    Start     Stop Route Frequency Ordered    07/17/23 1630  silver sulfADIAZINE 1% cream 1 Tube         07/18 0429 Top ED 1 Time 07/17/23 1616    07/17/23 1251   vancomycin - pharmacy to dose  (vancomycin IVPB (PEDS and ADULTS))        See Hyperspace for full Linked Orders Report.    -- IV pharmacy to manage frequency 07/17/23 1152    07/17/23 1230  piperacillin-tazobactam (ZOSYN) 4.5 g in dextrose 5 % in water (D5W) 5 % 100 mL IVPB (MB+)         -- IV Every 8 hours (non-standard times) 07/17/23 1123        Latest lactate reviewed-  Recent Labs   Lab 07/17/23  1234   LACTATE 1.8     Organ dysfunction indicated by Acute kidney injury and Encephalopathy    Fluid challenge given 2 liters NS in ED - not hypotensive    Post- resuscitation assessment Yes Perfusion exam was performed within 6 hours of septic shock presentation after bolus shows Adequate tissue perfusion assessed by non-invasive monitoring       Will Not start Pressors- Levophed for MAP of 65  Source control achieved by: IV antibiotics    Acute encephalopathy  Patient appears encephalopathic and different from his usual baseline.  His BUN is 114 so uremic encephalopathy is on differential as well as sepsis.  Suspect these are etiology but will CT head for completeness.  - if no improvement, plan to consult Neurology.      Skin rash  Diagnosed with shingles and prescribed valacyclovir.  He states that this was picked up but unclear.  Continue renal dose fell acyclovir as well as IV antibiotics for superimposed bacterial infection and source of sepsis.  Precautions noted.  Wound care consult      Elevated troponin  Presents with sepsis and found to have elevated troponin of 0.183. Denies chest pain. ECG reviewed and read as unusual P axis. Has known LBBB. Recent echo with normal EF and grade II diastolic dysfunction. Suspect this is demand in setting of sepsis. Plan to repeat ECG and trend troponin.      DJD (degenerative joint disease)  Chronic issues, particularly left knee.  Will check left knee x-ray to further evaluate for sources of sepsis      Anasarca  This is chronic though patient appears volume depleted in  the setting of sepsis.  Continue to monitor.      Type 2 diabetes mellitus with hyperglycemia  Patient's FSGs are controlled on current medication regimen.  Last A1c reviewed-   Lab Results   Component Value Date    HGBA1C 7.8 (H) 06/02/2023     Most recent fingerstick glucose reviewed-   Recent Labs   Lab 07/17/23  1234   POCTGLUCOSE 126*     Current correctional scale  Low  Maintain anti-hyperglycemic dose as follows-   Antihyperglycemics (From admission, onward)    Start     Stop Route Frequency Ordered    07/17/23 1738  insulin aspart U-100 pen 0-5 Units         -- SubQ Before meals & nightly PRN 07/17/23 1641        Hold Oral hypoglycemics while patient is in the hospital.    BPH (benign prostatic hyperplasia)  De Leon in place, resume Flomax      Left knee pain  This is chronic, left knee appears swollen but did not appear erythematous or warm.  Will check left knee x-ray for any evidence of septic joint.      Essential hypertension  Hold medications in the setting of sepsis   Can resume once more stable      Acute kidney injury superimposed on chronic kidney disease  Presents with  and creatinine of 5.4 with baseline of 2.5-2.8.  BNP 99 when usually patient has BNP greater than 300.  Likely volume depletion in the setting of sepsis and poor p.o. intake.  He was given 2 L normal saline in the emergency department.  Currently with good urine output.  De Leon catheter in place.  Continue to monitor, if no improvement plans for Nephrology consult  Recheck lab work in the morning        VTE Risk Mitigation (From admission, onward)         Ordered     apixaban tablet 5 mg  2 times daily         07/17/23 1641     IP VTE HIGH RISK PATIENT  Once         07/17/23 1641     Place sequential compression device  Until discontinued         07/17/23 1641                           Sea Phelan MD  Department of Hospital Medicine  [facility] - Emergency Dept

## 2023-07-17 NOTE — CLINICAL REVIEW
Sepsis Screen (most recent)       Sepsis Screen () - 07/17/23 4567       Is the patient's history or complaint suggestive of a possible infection? Yes  -    Are there at least two of the following signs and symptoms present? Yes  -    Sepsis signs/symptoms - Hyper or Hypothermia Hyperthermia >100.4 or Hypothermia < 96.8  -    Sepsis signs/symptoms - Tachypnea Tachypnea     >20  -    Sepsis signs/symptoms - WBC WBC < 4,000 or WBC > 12,000  -    Are any of the following organ dysfunction criteria present and not considered to be due to a chronic condition? Yes  -    Organ Dysfunction Criteria Creatinine > 2.0  -    Organ Dysfunction Criteria - O2 O2 Saturation < 95% on room air  -    Initiate Sepsis Protocol No  -    Reason sepsis not considered Pt. receiving appropriate management  -              User Key  (r) = Recorded By, (t) = Taken By, (c) = Cosigned By      Initials Name     Connie Chong RN

## 2023-07-18 PROBLEM — E66.09 CLASS 1 OBESITY DUE TO EXCESS CALORIES WITH SERIOUS COMORBIDITY AND BODY MASS INDEX (BMI) OF 30.0 TO 30.9 IN ADULT: Status: ACTIVE | Noted: 2022-05-12

## 2023-07-18 LAB
ALBUMIN SERPL BCP-MCNC: 2.6 G/DL (ref 3.5–5.2)
ALP SERPL-CCNC: 60 U/L (ref 55–135)
ALT SERPL W/O P-5'-P-CCNC: 18 U/L (ref 10–44)
ANION GAP SERPL CALC-SCNC: 12 MMOL/L (ref 8–16)
AST SERPL-CCNC: 19 U/L (ref 10–40)
BASOPHILS # BLD AUTO: 0.01 K/UL (ref 0–0.2)
BASOPHILS NFR BLD: 0.1 % (ref 0–1.9)
BILIRUB SERPL-MCNC: 0.9 MG/DL (ref 0.1–1)
BUN SERPL-MCNC: 94 MG/DL (ref 8–23)
CALCIUM SERPL-MCNC: 8.9 MG/DL (ref 8.7–10.5)
CHLORIDE SERPL-SCNC: 100 MMOL/L (ref 95–110)
CO2 SERPL-SCNC: 25 MMOL/L (ref 23–29)
CREAT SERPL-MCNC: 3.9 MG/DL (ref 0.5–1.4)
DIFFERENTIAL METHOD: ABNORMAL
EOSINOPHIL # BLD AUTO: 0.1 K/UL (ref 0–0.5)
EOSINOPHIL NFR BLD: 0.7 % (ref 0–8)
ERYTHROCYTE [DISTWIDTH] IN BLOOD BY AUTOMATED COUNT: 13.7 % (ref 11.5–14.5)
EST. GFR  (NO RACE VARIABLE): 16 ML/MIN/1.73 M^2
GLUCOSE SERPL-MCNC: 164 MG/DL (ref 70–110)
HCT VFR BLD AUTO: 32.7 % (ref 40–54)
HGB BLD-MCNC: 10.9 G/DL (ref 14–18)
IMM GRANULOCYTES # BLD AUTO: 0.12 K/UL (ref 0–0.04)
IMM GRANULOCYTES NFR BLD AUTO: 0.9 % (ref 0–0.5)
LYMPHOCYTES # BLD AUTO: 0.6 K/UL (ref 1–4.8)
LYMPHOCYTES NFR BLD: 4.4 % (ref 18–48)
MAGNESIUM SERPL-MCNC: 2.1 MG/DL (ref 1.6–2.6)
MCH RBC QN AUTO: 26.8 PG (ref 27–31)
MCHC RBC AUTO-ENTMCNC: 33.3 G/DL (ref 32–36)
MCV RBC AUTO: 80 FL (ref 82–98)
MONOCYTES # BLD AUTO: 1.3 K/UL (ref 0.3–1)
MONOCYTES NFR BLD: 9.5 % (ref 4–15)
NEUTROPHILS # BLD AUTO: 11.4 K/UL (ref 1.8–7.7)
NEUTROPHILS NFR BLD: 84.4 % (ref 38–73)
NRBC BLD-RTO: 0 /100 WBC
PHOSPHATE SERPL-MCNC: 4.1 MG/DL (ref 2.7–4.5)
PLATELET # BLD AUTO: 152 K/UL (ref 150–450)
PMV BLD AUTO: 8.8 FL (ref 9.2–12.9)
POCT GLUCOSE: 164 MG/DL (ref 70–110)
POCT GLUCOSE: 249 MG/DL (ref 70–110)
POCT GLUCOSE: 260 MG/DL (ref 70–110)
POCT GLUCOSE: 309 MG/DL (ref 70–110)
POTASSIUM SERPL-SCNC: 3.3 MMOL/L (ref 3.5–5.1)
PROT SERPL-MCNC: 5.9 G/DL (ref 6–8.4)
RBC # BLD AUTO: 4.07 M/UL (ref 4.6–6.2)
SODIUM SERPL-SCNC: 137 MMOL/L (ref 136–145)
TROPONIN I SERPL DL<=0.01 NG/ML-MCNC: 0.15 NG/ML (ref 0–0.03)
VANCOMYCIN SERPL-MCNC: 15.9 UG/ML
WBC # BLD AUTO: 13.52 K/UL (ref 3.9–12.7)

## 2023-07-18 PROCEDURE — 80053 COMPREHEN METABOLIC PANEL: CPT | Performed by: STUDENT IN AN ORGANIZED HEALTH CARE EDUCATION/TRAINING PROGRAM

## 2023-07-18 PROCEDURE — 63600175 PHARM REV CODE 636 W HCPCS: Performed by: STUDENT IN AN ORGANIZED HEALTH CARE EDUCATION/TRAINING PROGRAM

## 2023-07-18 PROCEDURE — 97161 PT EVAL LOW COMPLEX 20 MIN: CPT

## 2023-07-18 PROCEDURE — 25000003 PHARM REV CODE 250: Performed by: STUDENT IN AN ORGANIZED HEALTH CARE EDUCATION/TRAINING PROGRAM

## 2023-07-18 PROCEDURE — 97165 OT EVAL LOW COMPLEX 30 MIN: CPT

## 2023-07-18 PROCEDURE — 83735 ASSAY OF MAGNESIUM: CPT | Performed by: STUDENT IN AN ORGANIZED HEALTH CARE EDUCATION/TRAINING PROGRAM

## 2023-07-18 PROCEDURE — 36415 COLL VENOUS BLD VENIPUNCTURE: CPT | Performed by: STUDENT IN AN ORGANIZED HEALTH CARE EDUCATION/TRAINING PROGRAM

## 2023-07-18 PROCEDURE — 36415 COLL VENOUS BLD VENIPUNCTURE: CPT | Performed by: EMERGENCY MEDICINE

## 2023-07-18 PROCEDURE — 80202 ASSAY OF VANCOMYCIN: CPT | Performed by: EMERGENCY MEDICINE

## 2023-07-18 PROCEDURE — 11000001 HC ACUTE MED/SURG PRIVATE ROOM

## 2023-07-18 PROCEDURE — 85025 COMPLETE CBC W/AUTO DIFF WBC: CPT | Performed by: STUDENT IN AN ORGANIZED HEALTH CARE EDUCATION/TRAINING PROGRAM

## 2023-07-18 PROCEDURE — 97535 SELF CARE MNGMENT TRAINING: CPT

## 2023-07-18 PROCEDURE — 97110 THERAPEUTIC EXERCISES: CPT

## 2023-07-18 PROCEDURE — 84100 ASSAY OF PHOSPHORUS: CPT | Performed by: STUDENT IN AN ORGANIZED HEALTH CARE EDUCATION/TRAINING PROGRAM

## 2023-07-18 PROCEDURE — 63600175 PHARM REV CODE 636 W HCPCS: Performed by: EMERGENCY MEDICINE

## 2023-07-18 PROCEDURE — 84484 ASSAY OF TROPONIN QUANT: CPT | Performed by: STUDENT IN AN ORGANIZED HEALTH CARE EDUCATION/TRAINING PROGRAM

## 2023-07-18 PROCEDURE — 25000003 PHARM REV CODE 250: Performed by: EMERGENCY MEDICINE

## 2023-07-18 RX ORDER — POTASSIUM CHLORIDE 20 MEQ/1
40 TABLET, EXTENDED RELEASE ORAL ONCE
Status: COMPLETED | OUTPATIENT
Start: 2023-07-18 | End: 2023-07-18

## 2023-07-18 RX ORDER — HYDRALAZINE HYDROCHLORIDE 25 MG/1
75 TABLET, FILM COATED ORAL EVERY 8 HOURS
Status: DISCONTINUED | OUTPATIENT
Start: 2023-07-18 | End: 2023-07-20

## 2023-07-18 RX ADMIN — INSULIN ASPART 4 UNITS: 100 INJECTION, SOLUTION INTRAVENOUS; SUBCUTANEOUS at 11:07

## 2023-07-18 RX ADMIN — PIPERACILLIN AND TAZOBACTAM 4.5 G: 4; .5 INJECTION, POWDER, LYOPHILIZED, FOR SOLUTION INTRAVENOUS; PARENTERAL at 05:07

## 2023-07-18 RX ADMIN — APIXABAN 5 MG: 5 TABLET, FILM COATED ORAL at 08:07

## 2023-07-18 RX ADMIN — VANCOMYCIN HYDROCHLORIDE 500 MG: 500 INJECTION, POWDER, LYOPHILIZED, FOR SOLUTION INTRAVENOUS at 09:07

## 2023-07-18 RX ADMIN — TAMSULOSIN HYDROCHLORIDE 0.4 MG: 0.4 CAPSULE ORAL at 08:07

## 2023-07-18 RX ADMIN — VALACYCLOVIR HYDROCHLORIDE 1000 MG: 500 TABLET, FILM COATED ORAL at 08:07

## 2023-07-18 RX ADMIN — PIPERACILLIN AND TAZOBACTAM 4.5 G: 4; .5 INJECTION, POWDER, LYOPHILIZED, FOR SOLUTION INTRAVENOUS; PARENTERAL at 08:07

## 2023-07-18 RX ADMIN — HYDRALAZINE HYDROCHLORIDE 75 MG: 25 TABLET, FILM COATED ORAL at 10:07

## 2023-07-18 RX ADMIN — AMIODARONE HYDROCHLORIDE 200 MG: 200 TABLET ORAL at 08:07

## 2023-07-18 RX ADMIN — INSULIN ASPART 3 UNITS: 100 INJECTION, SOLUTION INTRAVENOUS; SUBCUTANEOUS at 04:07

## 2023-07-18 RX ADMIN — PIPERACILLIN AND TAZOBACTAM 4.5 G: 4; .5 INJECTION, POWDER, LYOPHILIZED, FOR SOLUTION INTRAVENOUS; PARENTERAL at 11:07

## 2023-07-18 RX ADMIN — POTASSIUM CHLORIDE 10 MEQ: 750 TABLET, EXTENDED RELEASE ORAL at 09:07

## 2023-07-18 RX ADMIN — METOPROLOL SUCCINATE 25 MG: 25 TABLET, EXTENDED RELEASE ORAL at 08:07

## 2023-07-18 RX ADMIN — TRAMADOL HYDROCHLORIDE 50 MG: 50 TABLET, COATED ORAL at 08:07

## 2023-07-18 RX ADMIN — HYDRALAZINE HYDROCHLORIDE 75 MG: 25 TABLET, FILM COATED ORAL at 11:07

## 2023-07-18 RX ADMIN — POTASSIUM CHLORIDE 40 MEQ: 1500 TABLET, EXTENDED RELEASE ORAL at 08:07

## 2023-07-18 NOTE — PLAN OF CARE
Suyapa from [facility] informed SW that patient wants to live in a nursing home. Suyapa stated that patient told her that he can no longer take care of himself. Suyapa stated that patient has said this in the past and has changed his mind.     [facility]COLN called patient to discuss nursing home placement. Patient working with PT.    LINCOLN called in [employer] and faxed in PAS[facility].     [facility]COLN sent referrals to 6 nursing homes on the [place].

## 2023-07-18 NOTE — ASSESSMENT & PLAN NOTE
Body mass index is 30.5 kg/m². Morbid obesity complicates all aspects of disease management from diagnostic modalities to treatment. Weight loss encouraged and health benefits explained to patient.

## 2023-07-18 NOTE — PLAN OF CARE
Case Management Assessment     PCP: Ziyad  Pharmacy: [facility]    Patient Arrived From: home  Existing Help at Home: None    Barriers to Discharge: None    Discharge Plan:    A. Return to assisted living   B. Return to assisted living       07/17/23 1921   Discharge Assessment   Assessment Type Discharge Planning Assessment   Confirmed/corrected address, phone number and insurance Yes   Confirmed Demographics Correct on Facesheet   Source of Information patient   Communicated ELY with patient/caregiver Date not available/Unable to determine   Reason For Admission Sepsis   People in Home alone   Do you expect to return to your current living situation? Yes   Do you have help at home or someone to help you manage your care at home? No   Prior to hospitilization cognitive status: Alert/Oriented   Current cognitive status: Alert/Oriented   Equipment Currently Used at Home rollator;cane, straight;power chair;walker, rolling   Readmission within 30 days? No   Patient currently being followed by outpatient case management? No   Do you currently have service(s) that help you manage your care at home? Yes   How Many hours does patient receive services 2   Name and Contact number of agency [facility]   Is the pt/caregiver preference to resume services with current agency Yes   Do you take prescription medications? Yes   Do you have prescription coverage? Yes   Coverage Medicaid   Do you have any problems affording any of your prescribed medications? No   Is the patient taking medications as prescribed? yes   Who is going to help you get home at discharge? patient stated that he does not have any help   How do you get to doctors appointments? health plan transportation   Are you on dialysis? No   Do you take coumadin? No   Discharge Plan A Assisted Living   Discharge Plan B Assisted Living   DME Needed Upon Discharge  none   Discharge Plan discussed with: Patient   Transition of Care Barriers None

## 2023-07-18 NOTE — ASSESSMENT & PLAN NOTE
-This is chronic, left knee appears swollen but did not appear erythematous or warm.    -XR left knee: No evidence of acute displaced fracture, dislocation, or osseous destructive process.  Severe joint space narrowing is seen at the medial tibiofemoral compartment.  There is significant suprapatellar joint effusion

## 2023-07-18 NOTE — ASSESSMENT & PLAN NOTE
Patient appears encephalopathic and different from his usual baseline on admission.  His BUN is 114 so uremic encephalopathy is on differential as well as sepsis.  Suspect these are etiology but will CT head for completeness.  - Ct head: No acute intracranial abnormalities identified.  No significant detrimental change compared to recent CT head 06/01/2023.   - AOx4, mental status normal and at baseline on 07/18  -Resolved

## 2023-07-18 NOTE — ASSESSMENT & PLAN NOTE
-Presents with sepsis and found to have elevated troponin of 0.183.   -Denies chest pain. ECG reviewed and read as unusual P axis. Has known LBBB.   -Recent echo with normal EF and grade II diastolic dysfunction.   -Suspect this is demand in setting of sepsis.   -trend troponin, downtrend

## 2023-07-18 NOTE — CONSULTS
[facility] Surg  Wound Care    Patient Name:  Chato Bowie   MRN:  [id number]  Date: 7/18/2023  Diagnosis: Sepsis    History:     Past Medical History:   Diagnosis Date    Acute congestive heart failure 3/20/2020    Alcohol abuse     Arthritis     Asthma attack 11/24/2021    Coronary artery disease     Diabetes mellitus     Hyperlipemia     Hypertension     Multiple thyroid nodules 6/14/2023    Rhabdomyolysis        Social History     Socioeconomic History    Marital status: Single   Tobacco Use    Smoking status: Never    Smokeless tobacco: Never   Substance and Sexual Activity    Alcohol use: Not Currently     Alcohol/week: 6.0 standard drinks     Types: 6 Cans of beer per week     Comment: daily    Drug use: No    Sexual activity: Yes     Partners: Female     Social Determinants of Health     Financial Resource Strain: Medium Risk    Difficulty of Paying Living Expenses: Somewhat hard   Food Insecurity: No Food Insecurity    Worried About Running Out of Food in the Last Year: Never true    Ran Out of Food in the Last Year: Never true   Transportation Needs: No Transportation Needs    Lack of Transportation (Medical): No    Lack of Transportation (Non-Medical): No   Physical Activity: Inactive    Days of Exercise per Week: 0 days    Minutes of Exercise per Session: 0 min   Stress: Stress Concern Present    Feeling of Stress : To some extent   Social Connections: Socially Isolated    Frequency of Communication with Friends and Family: Once a week    Frequency of Social Gatherings with Friends and Family: Once a week    Attends Confucianist Services: Never    Active Member of Clubs or Organizations: No    Attends Club or Organization Meetings: Never    Marital Status: Never    Housing Stability: Low Risk     Unable to Pay for Housing in the Last Year: No    Number of Places Lived in the Last Year: 1    Unstable Housing in the Last Year: No       Precautions:     Allergies as of 07/17/2023    (No Known  Allergies)       [facility] Assessment Details/Treatment   Consulted for right gluteal wound  A 72 year old male admitted 7/17/23 from home with sepsis; acute encephalopathy; skin rash; elevated troponin; DJD; anasarca; DM II with hyperglycemia; BPH; left knee pain; essential hypertension; ELIZABETH superimposed on CKD  Treatment in ED for skin rash and herpes zoster 7/12 and 7/13. Returned to ED 7/17/23 after unable to get up from chair and reported sitting in urine and feces  7/18 4:09 am 4 Eyes Skin Assessment- LDA for pressure injury previously documented - noted only pressure injuries documented were right heel on 7/17 and buttock on 7/18. Other areas of skin breakdown were described as blisters and incontinence associated dermatitis  7/18 WBC 13.52 Hgb 10.9 Hct 32.7 Alb 2.6 Weight 218 lbs   6/2 A1C 7.8  Treatment with Acyclovir daily- shingles right flank/buttock  On Isoflex mattress; Magdiel score 13; wearing EHOB boots; jakcson catheter  Assessment:  Photodocumentation    Right anterior thigh(from Media)- Drying vesicles with scabs. No drainage. No surrounding erythema.     Right buttock- Eschar/slough at site of shingles. Scant serous drainage. Complaining of pain.   Legs- edematous  Feet- Callus on left and right lateral foot  Treatment:  Local wound care to wounds on right buttock and right anterior upper thigh with hydrogel bid after cleansing with Vashe.   Recommendations made to primary team. Orders placed.     07/18/2023

## 2023-07-18 NOTE — NURSING
[facility] [place]  Nurses Note -- 4 Eyes      7/18/2023       Skin assessed on: Admit      [] No Pressure Injuries Present    []Prevention Measures Documented    [x] Yes LDA  for Pressure Injury Previously documented     [] Yes New Pressure Injury Discovered   [] LDA for New Pressure Injury Added      Attending RN:  Balbir Walker LPN     Second RN:  Eliud, PCT

## 2023-07-18 NOTE — HOSPITAL COURSE
72-year-old with extensive past medical history including CHF, arthritis, coronary artery disease, diabetes mellitus type 2, hypertension and hyperlipidemia admitted on 7/17/2023 for sepsis likely secondary to shingles and overlying cellulitis and acute kidney injury. Stated on IV antibiotics and continued on valacyclovir for shingles treatment. Wound care consulted. Also found to have elevated troponin and hypokalemia.  Electrolytes replaced as needed.  Suspect troponin elevation due to demand in the setting of sepsis and ELIZABETH.  ECG reviewed and read as unusual P axis. Has known LBBB. Recent echo with normal EF and grade II diastolic dysfunction.  Troponin trended down.  Patient without any chest pain or shortness of breath. ELIZABETH improving and leukocytosis resolved. PT/OT consulted-recommends SNF. Patient unable to care for himself, will likely need to transition to [facility]. Patient medically clear and stable for discharge to SNF. Awaiting placement.

## 2023-07-18 NOTE — ASSESSMENT & PLAN NOTE
-Presents with  and creatinine of 5.4 with baseline of 2.5-2.8.    -BNP 99 when usually patient has BNP greater than 300.    -Likely volume depletion in the setting of sepsis and poor p.o. intake.    -He was given 2 L normal saline in the emergency department.  Currently with good urine output.  -De Leon catheter in place.  -Renal US ordered  -Continue to monitor, if no improvement plans for Nephrology consult  -Recheck lab work in the morning

## 2023-07-18 NOTE — PROGRESS NOTES
[facility] Medicine  Progress Note    Patient Name: Chato Bowie  MRN: [id number]  Patient Class: IP- Inpatient   Admission Date: 7/17/2023  Length of Stay: 1 days  Attending Physician: Tiffanie Rojas DO  Primary Care Provider: Irlanda Valenzuela        Subjective:     Principal Problem:Sepsis        HPI:  Mr. Bowie is a 72-year-old with extensive past medical history including CHF, arthritis, coronary artery disease, diabetes mellitus type 2, hypertension and hyperlipidemia who presents to the emergency department for evaluation of pain and swelling.  Patient was recently diagnosed with shingles and sent home with foul acyclovir.  Patient was found at home unable to get up from his chair and sitting in urine and feces.  Patient is not able to give much history in the emergency department so most of this history was obtained from medical review and providers in the emergency department.    In the emergency department, patient was found to be febrile of 100.8 with initial blood pressure 125/68 saturating 98% on room air.  Lab work revealed a leukocytosis of 17.4, acute on chronic kidney disease with creatinine 5.4  with elevated procalcitonin.  Patient underwent ultrasound of lower extremities which did not reveal DVT.  CT abdomen and pelvis did not show any acute intra-abdominal abnormalities but does note soft tissue edema in the bilateral hip regions.  Patient also found to have concerns for infection of right flank and hip wounds.  Patient admitted to hospital medicine for further management.        Overview/Hospital Course:   72-year-old with extensive past medical history including CHF, arthritis, coronary artery disease, diabetes mellitus type 2, hypertension and hyperlipidemia admitted on 7/17/2023 for sepsis likely secondary to shingles and overlying cellulitis and acute kidney injury. Stated on IV antibiotics and continued on valacyclovir for shingles treatment. Wound care  consulted. Also found to have elevated troponin and hypokalemia.  Electrolytes replaced as needed.  Suspect troponin elevation due to demand in the setting of sepsis and ELIZABETH.  ECG reviewed and read as unusual P axis. Has known LBBB. Recent echo with normal EF and grade II diastolic dysfunction.  Troponin trended down.  Patient without any chest pain or shortness of breath. ELIZABETH and leukocytosis improving. PT/OT consulted-recommends SNF. Patient unable to care for himself, will likely need to transition to nursing home NH.       Interval History:  No acute overnight events.  Patient remained afebrile overnight.  Continues to complain of pain to right buttock and right thigh pain from rash.  Complains of generalized weakness, and state that he feels like he can not walk like usual.  Lives alone and states he can no longer take care of himself.  Denies any dysuria, difficulty urinating, or hematuria.  Alert oriented x4.    Review of Systems   Constitutional:  Negative for chills, fatigue and fever.   Respiratory:  Negative for cough and shortness of breath.    Cardiovascular:  Positive for leg swelling (chronic, history of anarsarca). Negative for chest pain and palpitations.   Gastrointestinal:  Negative for abdominal pain, diarrhea, nausea and vomiting.   Genitourinary:  Negative for difficulty urinating, dysuria, frequency and hematuria.   Musculoskeletal:  Positive for gait problem and myalgias. Negative for joint swelling.   Skin:  Positive for rash.   Neurological:  Positive for weakness. Negative for dizziness and headaches.     Objective:     Vital Signs (Most Recent):  Temp: 99 °F (37.2 °C) (07/18/23 1627)  Pulse: 64 (07/18/23 1627)  Resp: 16 (07/18/23 1627)  BP: (!) 162/70 (07/18/23 1627)  SpO2: 100 % (07/18/23 1627) Vital Signs (24h Range):  Temp:  [96 °F (35.6 °C)-99 °F (37.2 °C)] 99 °F (37.2 °C)  Pulse:  [64-73] 64  Resp:  [15-28] 16  SpO2:  [96 %-100 %] 100 %  BP: (144-171)/(67-82) 162/70     Weight: 99.2 kg  (218 lb 11.2 oz)  Body mass index is 30.5 kg/m².    Intake/Output Summary (Last 24 hours) at 7/18/2023 1646  Last data filed at 7/18/2023 1216  Gross per 24 hour   Intake 2264.59 ml   Output 3800 ml   Net -1535.41 ml         Physical Exam  Vitals and nursing note reviewed.   Constitutional:       General: He is not in acute distress.     Appearance: He is obese. He is ill-appearing.   HENT:      Mouth/Throat:      Mouth: Mucous membranes are moist.   Eyes:      Extraocular Movements: Extraocular movements intact.   Cardiovascular:      Rate and Rhythm: Normal rate and regular rhythm.      Heart sounds: No murmur heard.    No gallop.   Pulmonary:      Effort: Pulmonary effort is normal. No respiratory distress.      Breath sounds: No wheezing.   Abdominal:      General: Bowel sounds are normal. There is no distension.      Tenderness: There is no abdominal tenderness. There is no guarding.   Musculoskeletal:         General: Swelling present.   Skin:     General: Skin is warm.      Findings: Rash present.      Comments: Noted lesion to right heel, right lateral calf as well as skin lesions to the right flank.  Refer to media images   Neurological:      Mental Status: He is alert and oriented to person, place, and time.      Motor: Weakness (generaized weakness) present.   Psychiatric:         Mood and Affect: Mood normal.         Behavior: Behavior normal.           Significant Labs: All pertinent labs within the past 24 hours have been reviewed.    Significant Imaging: I have reviewed all pertinent imaging results/findings within the past 24 hours.      Assessment/Plan:      * Sepsis  This patient does have evidence of infective focus  My overall impression is sepsis.  Source: left knee vs superimposed cellulitis (shingles).  Suspect source is cellulitis at this time.  Antibiotics given-   Antibiotics (72h ago, onward)    Start     Stop Route Frequency Ordered    07/17/23 1251  vancomycin - pharmacy to dose   (vancomycin IVPB (PEDS and ADULTS))        See Hyperspace for full Linked Orders Report.    -- IV pharmacy to manage frequency 07/17/23 1152    07/17/23 1230  piperacillin-tazobactam (ZOSYN) 4.5 g in dextrose 5 % in water (D5W) 5 % 100 mL IVPB (MB+)         -- IV Every 8 hours (non-standard times) 07/17/23 1123        Latest lactate reviewed-  Recent Labs   Lab 07/17/23  1234 07/17/23  1753   LACTATE 1.8 1.3     Organ dysfunction indicated by Acute kidney injury and Encephalopathy    Fluid challenge given 2 liters NS in ED - not hypotensive    Post- resuscitation assessment Yes Perfusion exam was performed within 6 hours of septic shock presentation after bolus shows Adequate tissue perfusion assessed by non-invasive monitoring       Will Not start Pressors  Source control achieved by: IV antibiotics    -Presented febrile, tachypneic and with leukocytosis  -Also have ELIZABETH on admission  -recently diagnosed with shingles, but patient has not started his prescription of the valacyclovir  -blood culture with no growth to date.    -continue valacyclovir for shingles and Zosyn for superimposed cellulitis    Acute kidney injury superimposed on chronic kidney disease  -Presents with  and creatinine of 5.4 with baseline of 2.5-2.8.    -BNP 99 when usually patient has BNP greater than 300.    -Likely volume depletion in the setting of sepsis and poor p.o. intake.    -He was given 2 L normal saline in the emergency department.  Currently with good urine output.  -De Leon catheter in place.  -Renal US ordered  -Continue to monitor, if no improvement plans for Nephrology consult  -Recheck lab work in the morning      Skin rash  -Diagnosed with shingles and prescribed valacyclovir.    -He states that this was picked up but unclear.    -Continue renal dose valacyclovir as well as IV antibiotics for superimposed bacterial infection and source of sepsis.  Precautions noted.  -Wound care consult      Hypokalemia  -Replace as  needed      Essential hypertension  -blood pressure elevated, patient is stable   -resume home hydralazine, metoprolol succinate, and amiodarone      Acute encephalopathy  Patient appears encephalopathic and different from his usual baseline on admission.  His BUN is 114 so uremic encephalopathy is on differential as well as sepsis.  Suspect these are etiology but will CT head for completeness.  - Ct head: No acute intracranial abnormalities identified.  No significant detrimental change compared to recent CT head 06/01/2023.   - AOx4, mental status normal and at baseline on 07/18  -Resolved      Elevated troponin  -Presents with sepsis and found to have elevated troponin of 0.183.   -Denies chest pain. ECG reviewed and read as unusual P axis. Has known LBBB.   -Recent echo with normal EF and grade II diastolic dysfunction.   -Suspect this is demand in setting of sepsis.   -trend troponin, downtrend      Type 2 diabetes mellitus with hyperglycemia  Patient's FSGs are controlled on current medication regimen.  Last A1c reviewed-   Lab Results   Component Value Date    HGBA1C 7.8 (H) 06/02/2023     Most recent fingerstick glucose reviewed-   Recent Labs   Lab 07/17/23  1802 07/18/23  0759 07/18/23  1103 07/18/23  1627   POCTGLUCOSE 175* 164* 309* 260*     Current correctional scale  Low  Maintain anti-hyperglycemic dose as follows-   Antihyperglycemics (From admission, onward)    Start     Stop Route Frequency Ordered    07/17/23 1738  insulin aspart U-100 pen 0-5 Units         -- SubQ Before meals & nightly PRN 07/17/23 1641        Hold Oral hypoglycemics while patient is in the hospital.    Anasarca  This is chronic though patient appears volume depleted in the setting of sepsis.    Continue to monitor.      Left knee pain  -This is chronic, left knee appears swollen but did not appear erythematous or warm.    -XR left knee: No evidence of acute displaced fracture, dislocation, or osseous destructive process.   Severe joint space narrowing is seen at the medial tibiofemoral compartment.  There is significant suprapatellar joint effusion      DJD (degenerative joint disease)  -Chronic issues, particularly left knee.  -XR of left knee: No evidence of acute displaced fracture, dislocation, or osseous destructive process.  Severe joint space narrowing is seen at the medial tibiofemoral compartment.  There is significant suprapatellar joint effusion      BPH (benign prostatic hyperplasia)  De Leon in place, resume Flomax      Class 1 obesity due to excess calories with serious comorbidity and body mass index (BMI) of 30.0 to 30.9 in adult  Body mass index is 30.5 kg/m². Morbid obesity complicates all aspects of disease management from diagnostic modalities to treatment. Weight loss encouraged and health benefits explained to patient.           VTE Risk Mitigation (From admission, onward)         Ordered     apixaban tablet 5 mg  2 times daily         07/17/23 1641     IP VTE HIGH RISK PATIENT  Once         07/17/23 1641     Place sequential compression device  Until discontinued         07/17/23 1641                Discharge Planning   ELY:      Code Status: Full Code   Is the patient medically ready for discharge?:     Reason for patient still in hospital (select all that apply): Patient trending condition, Treatment and PT / OT recommendations  Discharge Plan A: Assisted Living                  Tiffanie Rojas DO  Department of Hospital Medicine   [facility] - Med Surg

## 2023-07-18 NOTE — ASSESSMENT & PLAN NOTE
This patient does have evidence of infective focus  My overall impression is sepsis.  Source: left knee vs superimposed cellulitis (shingles).  Suspect source is cellulitis at this time.  Antibiotics given-   Antibiotics (72h ago, onward)    Start     Stop Route Frequency Ordered    07/17/23 1251  vancomycin - pharmacy to dose  (vancomycin IVPB (PEDS and ADULTS))        See [place] for full Linked Orders Report.    -- IV pharmacy to manage frequency 07/17/23 1152    07/17/23 1230  piperacillin-tazobactam (ZOSYN) 4.5 g in dextrose 5 % in water (D5W) 5 % 100 mL IVPB (MB+)         -- IV Every 8 hours (non-standard times) 07/17/23 1123        Latest lactate reviewed-  Recent Labs   Lab 07/17/23  1234 07/17/23  1753   LACTATE 1.8 1.3     Organ dysfunction indicated by Acute kidney injury and Encephalopathy    Fluid challenge given 2 liters NS in ED - not hypotensive    Post- resuscitation assessment Yes Perfusion exam was performed within 6 hours of septic shock presentation after bolus shows Adequate tissue perfusion assessed by non-invasive monitoring       Will Not start Pressors  Source control achieved by: IV antibiotics    -Presented febrile, tachypneic and with leukocytosis  -Also have ELIZABETH on admission  -recently diagnosed with shingles, but patient has not started his prescription of the valacyclovir  -blood culture with no growth to date.    -continue valacyclovir for shingles and Zosyn for superimposed cellulitis

## 2023-07-18 NOTE — PT/OT/SLP EVAL
Occupational Therapy   Evaluation/Treatment    Name: Chato Bowie  MRN: [id number]  Admitting Diagnosis: Sepsis  Recent Surgery: * No surgery found *      Recommendations:     Discharge Recommendations: nursing facility, skilled  Discharge Equipment Recommendations:   (TBD)  Barriers to discharge:   (The patient is not at his PLOF and is not able to safely return home alone. The pateint has a hx of frequent hospital adm)    Assessment:     Chato Bowie is a 72 y.o. male with a medical diagnosis of Sepsis.   Performance deficits affecting function: weakness, impaired endurance, impaired self care skills, impaired functional mobility, gait instability, impaired balance, decreased coordination, decreased upper extremity function, decreased lower extremity function, decreased safety awareness, pain, decreased ROM, impaired skin, edema, impaired cardiopulmonary response to activity.    The patient participated in OT eval with encouragement. The patient was limited by c/o left knee and right hip/flank pain. The patient required max assist x2 person for bed mobility and to stand using a RW. The patient was unable to transfer to a chair 2* pain and weakness. Patient will benefit from OT to address functional deficits.       Rehab Prognosis: Good and Fair; patient would benefit from acute skilled OT services to address these deficits and reach maximum level of function.       Plan:     Patient to be seen 5 x/week to address the above listed problems via self-care/home management, therapeutic activities, therapeutic exercises  Plan of Care Expires: 08/01/23  Plan of Care Reviewed with: patient    Subjective     Chief Complaint: pain in right hip/flank and left knee  Patient/Family Comments/goals: unable to state goals    Occupational Profile:  Living Environment: Patient lives alone in a third floor apartment with elevator  Previous level of function:  The patient used a RW to amb in the bathroom and uses a scooter to get  around the apt. The patient dresses himself with difficulty and sponge bathes  Roles and Routines: frequent hospital visits, niece who assists with groceries or [employer] transportation for medical appointments  Equipment Used at Home: rollator, power chair, walker, rolling, cane, straight  Assistance upon Discharge: has a niece who assists with groceries    Pain/Comfort:  Pain Rating 1:  (yes-did not rate)  Location - Side 1: Left  Location 1: knee  Pain Addressed 1: Distraction, Cessation of Activity, Nurse notified  Location - Side 2: Right  Location 2: hip (and flank)    Patients cultural, spiritual, Zoroastrian conflicts given the current situation: no    Objective:     Communicated with: Samantha banuelos prior to session.  Patient found right sidelying with bed alarm, jackson catheter, peripheral IV, pressure relief boots, telemetry upon OT entry to room.    General Precautions: Standard, fall, diabetic (right flank wounds/blisters)  Orthopedic Precautions: N/A  Braces: N/A  Respiratory Status: Room air    Occupational Performance:    Bed Mobility:    Patient completed Rolling/Turning to Left with  maximal assistance and with side rail  Patient completed Rolling/Turning to Right with maximal assistance and with side rail  Patient completed Scooting/Bridging with maximal assistance and 2 persons  Patient completed Supine to Sit with maximal assistance and 2 persons  Patient completed Sit to Supine with maximal assistance and 2 persons    Functional Mobility/Transfers:  Patient completed Sit <> Stand Transfer with maximal assistance and of 2 persons  with  rolling walker   Functional Mobility: The patient stood with max assist x2 person with forward flexed posture. See PT note for ambulation.    Activities of Daily Living:  Feeding:  stand by assistance to drink water from a cup in elevated bed  Upper Body Dressing: maximal assistance to doff front soiled gown and don clean gown while seated on the EOB  Lower Body  Dressing: dependence to don/doff B socks    Cognitive/Visual Perceptual:  Cognitive/Psychosocial Skills:     -       Oriented to: Person and Place   -       Follows Commands/attention:Inattentive, Easily distracted, and Follows one-step commands  -       Communication: clear/fluent and sometimes difficult to understand  -       Memory: No Deficits noted  -       Safety awareness/insight to disability: impaired   -       Mood/Affect/Coping skills/emotional control: Appropriate to situation    Physical Exam:  Balance: -       fair sitting with posterior lean while engaged in LE  exercise, poor standing  Postural examination/scapula alignment:    -       Rounded shoulders  -       Forward head  Skin integrity: dressing to the right flank, blisters per chart  Edema:  present in BLE  Sensation:    -       Intact  Dominant hand: -       right  Upper Extremity Range of Motion:     -       Right Upper Extremity: WFL  -       Left Upper Extremity: WFL  Upper Extremity Strength:    -       Right Upper Extremity: WFL  -       Left Upper Extremity: WFL   Strength:    -       Right Upper Extremity: WFL  -       Left Upper Extremity: WFL    AMPAC 6 Click ADL:  AMPAC Total Score: 14    Treatment & Education:  OT apurva  Performed self care and functional mobility as noted above  Educated the patient re: need ti reposition in bed to prevent pressure injury  Educated the patient re: need to perform ROM to BUE frequently while in bed    Patient left left sidelying with all lines intact, call button in reach, bed alarm on, and nurse notified    GOALS:   Multidisciplinary Problems       Occupational Therapy Goals          Problem: Occupational Therapy    Goal Priority Disciplines Outcome Interventions   Occupational Therapy Goal     OT, PT/OT Ongoing, Progressing    Description: Goals to be met by: 8/1/23     Patient will increase functional independence with ADLs by performing:    Feeding with [place].  UE Dressing with  Modified [place].  LE Dressing with Modified [place] and Supervision.  Grooming while standing at sink with Contact Guard Assistance and Assistive Devices as needed.  Toileting from bedside commode with Set-up Assistance and Supervision for hygiene and clothing management.   Rolling to Bilateral with Modified [place].   Supine to sit with Modified [place] and Supervision.  Step transfer with Contact Guard Assistance  Toilet transfer to bedside commode with Contact Guard Assistance.  Upper extremity exercise program x15 reps per handout, with assistance as needed.                         History:     Past Medical History:   Diagnosis Date    Acute congestive heart failure 3/20/2020    Alcohol abuse     Arthritis     Asthma attack 11/24/2021    Coronary artery disease     Diabetes mellitus     Hyperlipemia     Hypertension     Multiple thyroid nodules 6/14/2023    Rhabdomyolysis          Past Surgical History:   Procedure Laterality Date    EYE SURGERY      TRANSESOPHAGEAL ECHOCARDIOGRAM WITH POSSIBLE CARDIOVERSION (MARIA W/ POSS CARDIOVERSION) N/A 1/5/2023    Procedure: Transesophageal echo (MARIA) intra-procedure log documentation;  Surgeon: Indra Foote MD;  Location: [facility] CATH LAB;  Service: Cardiology;  Laterality: N/A;    TREATMENT OF CARDIAC ARRHYTHMIA N/A 12/7/2020    Procedure: Cardioversion or Defibrillation;  Surgeon: Indra Foote MD;  Location: [facility] CATH LAB;  Service: Cardiology;  Laterality: N/A;    TREATMENT OF CARDIAC ARRHYTHMIA N/A 12/30/2020    Procedure: Cardioversion or Defibrillation;  Surgeon: Tyrone Steen MD;  Location: [facility] CATH LAB;  Service: Cardiology;  Laterality: N/A;    TREATMENT OF CARDIAC ARRHYTHMIA N/A 1/5/2023    Procedure: Cardioversion or Defibrillation;  Surgeon: Indra Foote MD;  Location: [facility] CATH LAB;  Service: Cardiology;  Laterality: N/A;    VASCULAR SURGERY         Time Tracking:     OT Date of Treatment: 07/18/23  OT Start Time:  1425  OT Stop Time: 1448  OT Total Time (min): 23 min    Billable Minutes:Evaluation 15 (with PT)  Self Care/Home Management 8  Total Time 23    7/18/2023

## 2023-07-18 NOTE — PLAN OF CARE
Problem: Physical Therapy  Goal: Physical Therapy Goal  Description: Goals to be met by: 23     Patient will increase functional independence with mobility by performin. Supine to sit with Modified [place]  2. Rolling to Left and Right with Modified [place].  3. Sit to stand transfer with Modified [place]  4. Bed to chair transfer with Modified [place]    5. Gait  x 5-10 feet with Modified [place] using Rolling Walker.   6. Wheelchair propulsion x50-100 feet with Modified [place]   7. Lower extremity exercise program x10 reps per handout, with independence    Outcome: Ongoing, Progressing   Initial PT evaluation performed today.  Pt could benefit from skilled PT services 5-6x/wk in order to maximize function prior to D/C.  SNF recommended as pt requires Max A x 2 persons for mobility and is not functioning at Independent baseline.  Pt is unable to ambulate and has no assistance at home.

## 2023-07-18 NOTE — PT/OT/SLP EVAL
Physical Therapy Evaluation    Patient Name:  Chato Bowie   MRN:  [id number]    Recommendations:     Discharge Recommendations: nursing facility, skilled   Discharge Equipment Recommendations: none   Barriers to discharge:  Pt is not functioning at Mod I prior level and is at increased risk for falls, readmission, and morbidity if he returns to prior living arrangements at present time.    Assessment:     Chato Bowie is a 72 y.o. male admitted with a medical diagnosis of Sepsis.  He presents with the following impairments/functional limitations: weakness, gait instability, decreased upper extremity function, impaired endurance, impaired balance, decreased lower extremity function, impaired coordination, decreased safety awareness, pain, impaired cognition, impaired self care skills, impaired sensation, impaired functional mobility, edema, decreased coordination, abnormal tone .    Rehab Prognosis: Good; patient would benefit from acute skilled PT services to address these deficits and reach maximum level of function.    Recent Surgery: * No surgery found *      Plan:     During this hospitalization, patient to be seen  (3-5x/wk) to address the identified rehab impairments via gait training, therapeutic activities, therapeutic exercises, neuromuscular re-education, wheelchair management/training and progress toward the following goals:    Plan of Care Expires:  08/01/23    Subjective     Chief Complaint: pain  Patient/Family Comments/goals: Pt agreeable to evaluation  Pain/Comfort:  Pain Rating 1:  (not rated)  Location - Orientation 1:  (L knee, R flank)  Pain Addressed 1: Reposition, Cessation of Activity, Distraction    Patients cultural, spiritual, Adventism conflicts given the current situation: no    Living Environment:  Pt lives alone in an apt with an elevator   Prior to admission, patients level of function was Modified Independent with ADL's and mobility.  Equipment used at home: power chair, rollator,  walker, rolling.  DME owned (not currently used): single point cane.  Upon discharge, patient will have assistance from Niece gets his groceries only.    Objective:     Communicated with nsg prior to session.  Patient found HOB elevated with bed alarm, jackson catheter, peripheral IV, pressure relief boots, telemetry wedge on left upon PT entry to room.    General Precautions: Standard, fall, diabetic  Orthopedic Precautions:N/A   Braces: N/A  Respiratory Status: Room air    Exams:  Cognitive Exam:  Patient is oriented to Person and Place, month, lethargic, delayed responses  Gross Motor Coordination:  impaired 2/2 pain, body habitus, weakness and deconditioning  Postural Exam:  Patient presented with the following abnormalities:    -       Rounded shoulders  -       Forward head  Sensation:    -       Intact  light/touch B LE's  Skin Integrity/Edema:      -       Skin integrity: Visible skin intact  -       Edema: Moderate , Pitting, B LE's  RLE ROM: WFL's, painful  RLE Strength: dflx 5/5, knee ext 3-/5  LLE ROM: WFL  LLE Strength: dflx 5/5, knee ext 5/5    Functional Mobility:  Bed Mobility:     Scooting: moderate assistance and of 2 persons  Supine to Sit: maximal assistance and of 2 persons  Sit to Supine: maximal assistance and of 2 persons  Transfers:     Sit to Stand:  maximal assistance, of 2 persons, and with elevated bed height with rolling walker  Gait: Gait training with RW and mod A x 2 persons approx 1 step forward/backward and 3 to Left with Max Vc/TC for increased trunk ext, distribution of weight through UEs to walker, walker management and sequencing.  Tremors and fear of falling   Balance: Fair+ sit, Fair- stand      AM-PAC 6 CLICK MOBILITY  Total Score:10       Treatment & Education:  Educated pt to perform B AP's, TKe's, and GS while in bed throughout the day and handout provided.  Pt verbalized/demonstrated understanding    Patient left HOB elevated with all lines intact, call button in reach,  bed alarm on, nsg notified, and wedge on Right side with pressure relief boots donned .    GOALS:   Multidisciplinary Problems       Physical Therapy Goals          Problem: Physical Therapy    Goal Priority Disciplines Outcome Goal Variances Interventions   Physical Therapy Goal     PT, PT/OT Ongoing, Progressing     Description: Goals to be met by: 23     Patient will increase functional independence with mobility by performin. Supine to sit with Modified [place]  2. Rolling to Left and Right with Modified [place].  3. Sit to stand transfer with Modified [place]  4. Bed to chair transfer with Modified [place]    5. Gait  x 5-10 feet with Modified [place] using Rolling Walker.   6. Wheelchair propulsion x50-100 feet with Modified [place]   7. Lower extremity exercise program x10 reps per handout, with independence                         History:     Past Medical History:   Diagnosis Date    Acute congestive heart failure 3/20/2020    Alcohol abuse     Arthritis     Asthma attack 2021    Coronary artery disease     Diabetes mellitus     Hyperlipemia     Hypertension     Multiple thyroid nodules 2023    Rhabdomyolysis        Past Surgical History:   Procedure Laterality Date    EYE SURGERY      TRANSESOPHAGEAL ECHOCARDIOGRAM WITH POSSIBLE CARDIOVERSION (MARIA W/ POSS CARDIOVERSION) N/A 2023    Procedure: Transesophageal echo (MARIA) intra-procedure log documentation;  Surgeon: Indra Foote MD;  Location: [facility] CATH LAB;  Service: Cardiology;  Laterality: N/A;    TREATMENT OF CARDIAC ARRHYTHMIA N/A 2020    Procedure: Cardioversion or Defibrillation;  Surgeon: Indra Foote MD;  Location: [facility] CATH LAB;  Service: Cardiology;  Laterality: N/A;    TREATMENT OF CARDIAC ARRHYTHMIA N/A 2020    Procedure: Cardioversion or Defibrillation;  Surgeon: Tyrone Steen MD;  Location: [facility] CATH LAB;  Service: Cardiology;  Laterality: N/A;    TREATMENT OF  CARDIAC ARRHYTHMIA N/A 1/5/2023    Procedure: Cardioversion or Defibrillation;  Surgeon: Indra Foote MD;  Location: [facility] CATH LAB;  Service: Cardiology;  Laterality: N/A;    VASCULAR SURGERY         Time Tracking:     PT Received On: 07/18/23  PT Start Time: 1426     PT Stop Time: 1449  PT Total Time (min): 23 min     Billable Minutes: Evaluation 15 Co-evaluation with OT  and Therapeutic Exercise 12      07/18/2023

## 2023-07-18 NOTE — AI DETERIORATION ALERT
Artificial Intelligence Notification  [facility]  [place] [id number]  Phone: [phone]    This documentation was triggered by an Artificial Intelligence Notification:    Admit Date: 2023   LOS: 0  Code Status: Full Code  : 1950  Age: 72 y.o.  Weight:   Wt Readings from Last 1 Encounters:   23 95.3 kg (210 lb)        Sex: male  Bed: [address]  MRN: [id number]  Attending Physician: Jose L Evans III, MD     Date of Alert: 2023  Time AI Alert Received: 8:34 PM              Vitals:    23 1757   BP:    Pulse:    Resp:    Temp: 98.3 °F (36.8 °C)     SpO2: 96 %      Artificial Intelligence alert discussed with Provider:     Name: Dylon   Date/Time of Provider Notification: above      Patient Condition: Alert generated once patient arrived to the floor. Currently being managed for sepsis. Care ongoing.

## 2023-07-18 NOTE — NURSING
Pt arrived on unit, awake alert and oriented. IV site noted; saline lock. Pt on room air, no distress. Skin assess. Vitals assessed. Scheduled medications given. IV antibiotic. Pt given a sandwich and juice. [place] in place. Tele #[phone].  Call light within reach. Bed alarm set. Safety measures maintained. Will cont to monitor

## 2023-07-18 NOTE — ASSESSMENT & PLAN NOTE
-Chronic issues, particularly left knee.  -XR of left knee: No evidence of acute displaced fracture, dislocation, or osseous destructive process.  Severe joint space narrowing is seen at the medial tibiofemoral compartment.  There is significant suprapatellar joint effusion

## 2023-07-18 NOTE — ASSESSMENT & PLAN NOTE
-blood pressure elevated, patient is stable   -resume home hydralazine, metoprolol succinate, and amiodarone

## 2023-07-18 NOTE — ASSESSMENT & PLAN NOTE
Patient's FSGs are controlled on current medication regimen.  Last A1c reviewed-   Lab Results   Component Value Date    HGBA1C 7.8 (H) 06/02/2023     Most recent fingerstick glucose reviewed-   Recent Labs   Lab 07/17/23  1802 07/18/23  0759 07/18/23  1103 07/18/23  1627   POCTGLUCOSE 175* 164* 309* 260*     Current correctional scale  Low  Maintain anti-hyperglycemic dose as follows-   Antihyperglycemics (From admission, onward)    Start     Stop Route Frequency Ordered    07/17/23 1738  insulin aspart U-100 pen 0-5 Units         -- SubQ Before meals & nightly PRN 07/17/23 1641        Hold Oral hypoglycemics while patient is in the hospital.

## 2023-07-18 NOTE — PLAN OF CARE
Problem: Adult Inpatient Plan of Care  Goal: Plan of Care Review  Outcome: Ongoing, Progressing  Goal: Patient-Specific Goal (Individualized)  Outcome: Ongoing, Progressing  Goal: Absence of Hospital-Acquired Illness or Injury  Outcome: Ongoing, Progressing  Goal: Optimal Comfort and Wellbeing  Outcome: Ongoing, Progressing  Goal: Readiness for Transition of Care  Outcome: Ongoing, Progressing     Problem: Skin Injury Risk Increased  Goal: Skin Health and Integrity  Outcome: Ongoing, Progressing     Problem: Infection  Goal: Absence of Infection Signs and Symptoms  Outcome: Ongoing, Progressing     Problem: Diabetes Comorbidity  Goal: Blood Glucose Level Within Targeted Range  Outcome: Ongoing, Progressing     Problem: Adjustment to Illness (Sepsis/Septic Shock)  Goal: Optimal Coping  Outcome: Ongoing, Progressing     Problem: Impaired Wound Healing  Goal: Optimal Wound Healing  Outcome: Ongoing, Progressing   Pt A&Ox4, free from falls/injury, and able to make needs known during shift. VSS. Pt had no c/o pain during shift. Telemetry monitoring continued on box #[id number], visible and audible on monitor at nurses' station, no acute distress noted, telesitter at bedside. Bed locked and in lowest position. Bedside table and call light in reach. Will cont to monitor.

## 2023-07-18 NOTE — ASSESSMENT & PLAN NOTE
-Diagnosed with shingles and prescribed valacyclovir.    -He states that this was picked up but unclear.    -Continue renal dose valacyclovir as well as IV antibiotics for superimposed bacterial infection and source of sepsis.  Precautions noted.  -Wound care consult

## 2023-07-18 NOTE — PROGRESS NOTES
Pharmacokinetic Assessment Follow Up: IV Vancomycin    Vancomycin serum concentration assessment(s):    The random level was drawn correctly and can be used to guide therapy at this time. The measurement is within the desired definitive target range of 10 to 20 mcg/mL.    Vancomycin Regimen Plan:    Re-dose when the random level is less than 20 mcg/mL, next level to be drawn at 0600 on 7/19  Vancomycin 500mg to be given today  Drug levels (last 3 results):  Recent Labs   Lab Result Units 07/18/23  0606   Vancomycin, Random ug/mL 15.9       Pharmacy will continue to follow and monitor vancomycin.    Please contact pharmacy at extension [phone] for questions regarding this assessment.    Thank you for the consult,   Adrianna Hurtado       Patient brief summary:  Chato Bowie is a 72 y.o. male initiated on antimicrobial therapy with IV Vancomycin for treatment of bacteremia    Drug Allergies:   Review of patient's allergies indicates:  No Known Allergies    Actual Body Weight:   99.2 kg    Renal Function:   Estimated Creatinine Clearance: 20.6 mL/min (A) (based on SCr of 3.9 mg/dL (H)).,     Dialysis Method (if applicable):  N/A    CBC (last 72 hours):  Recent Labs   Lab Result Units 07/17/23  1234 07/18/23  0605   WBC K/uL 17.40* 13.52*   Hemoglobin g/dL 11.3* 10.9*   Hematocrit % 34.7* 32.7*   Platelets K/uL 203 152   Gran % % 83.7* 84.4*   Lymph % % 5.3* 4.4*   Mono % % 9.5 9.5   Eosinophil % % 0.1 0.7   Basophil % % 0.3 0.1   Differential Method  Automated Automated       Metabolic Panel (last 72 hours):  Recent Labs   Lab Result Units 07/17/23  1234 07/17/23  1334 07/18/23  0605   Sodium mmol/L 135*  --  137   Potassium mmol/L 3.4*  --  3.3*   Chloride mmol/L 96  --  100   CO2 mmol/L 24  --  25   Glucose mg/dL 126*  --  164*   Glucose, UA   --  4+*  --    BUN mg/dL 114*  --  94*   Creatinine mg/dL 5.4*  --  3.9*   Albumin g/dL 3.4*  --  2.6*   Total Bilirubin mg/dL 1.2*  --  0.9   Alkaline Phosphatase U/L 68  --  60    AST U/L 18  --  19   ALT U/L 18  --  18   Magnesium mg/dL 2.2  --  2.1   Phosphorus mg/dL  --   --  4.1       Vancomycin Administrations:  vancomycin given in the last 96 hours                     vancomycin (VANCOCIN) 2,000 mg in dextrose 5 % (D5W) 500 mL IVPB (mg) 2,000 mg New Bag 07/17/23 1307                    Microbiologic Results:  Microbiology Results (last 7 days)       Procedure Component Value Units Date/Time    Blood culture x two cultures. Draw prior to antibiotics. [[id number]] Collected: 07/17/23 1241    Order Status: Completed Specimen: Blood from Peripheral, Hand, Right Updated: 07/17/23 1912     Blood Culture, Routine No Growth to date    Narrative:      Aerobic and anaerobic    Blood culture x two cultures. Draw prior to antibiotics. [[id number]] Collected: 07/17/23 1239    Order Status: Completed Specimen: Blood from Peripheral, Antecubital, Left Updated: 07/17/23 1912     Blood Culture, Routine No Growth to date    Narrative:      Aerobic and anaerobic

## 2023-07-18 NOTE — PLAN OF CARE
Problem: Occupational Therapy  Goal: Occupational Therapy Goal  Description: Goals to be met by: 8/1/23     Patient will increase functional independence with ADLs by performing:    Feeding with [place].  UE Dressing with Modified [place].  LE Dressing with Modified [place] and Supervision.  Grooming while standing at sink with Contact Guard Assistance and Assistive Devices as needed.  Toileting from bedside commode with Set-up Assistance and Supervision for hygiene and clothing management.   Rolling to Bilateral with Modified [place].   Supine to sit with Modified [place] and Supervision.  Step transfer with Contact Guard Assistance  Toilet transfer to bedside commode with Contact Guard Assistance.  Upper extremity exercise program x15 reps per handout, with assistance as needed.    Outcome: Ongoing, Progressing   The patient participated in OT eval with encouragement. The patient was limited by c/o left knee and right hip/flank pain. The patient required max assist x2 person for bed mobility and to stand using a RW.     The patient will benefit from continued OT via SNF.

## 2023-07-18 NOTE — SUBJECTIVE & OBJECTIVE
Interval History:  No acute overnight events.  Patient remained afebrile overnight.  Continues to complain of pain to right buttock and right thigh pain from rash.  Complains of generalized weakness, and state that he feels like he can not walk like usual.  Lives alone and states he can no longer take care of himself.  Denies any dysuria, difficulty urinating, or hematuria.  Alert oriented x4.    Review of Systems   Constitutional:  Negative for chills, fatigue and fever.   Respiratory:  Negative for cough and shortness of breath.    Cardiovascular:  Positive for leg swelling (chronic, history of anarsarca). Negative for chest pain and palpitations.   Gastrointestinal:  Negative for abdominal pain, diarrhea, nausea and vomiting.   Genitourinary:  Negative for difficulty urinating, dysuria, frequency and hematuria.   Musculoskeletal:  Positive for gait problem and myalgias. Negative for joint swelling.   Skin:  Positive for rash.   Neurological:  Positive for weakness. Negative for dizziness and headaches.     Objective:     Vital Signs (Most Recent):  Temp: 99 °F (37.2 °C) (07/18/23 1627)  Pulse: 64 (07/18/23 1627)  Resp: 16 (07/18/23 1627)  BP: (!) 162/70 (07/18/23 1627)  SpO2: 100 % (07/18/23 1627) Vital Signs (24h Range):  Temp:  [96 °F (35.6 °C)-99 °F (37.2 °C)] 99 °F (37.2 °C)  Pulse:  [64-73] 64  Resp:  [15-28] 16  SpO2:  [96 %-100 %] 100 %  BP: (144-171)/(67-82) 162/70     Weight: 99.2 kg (218 lb 11.2 oz)  Body mass index is 30.5 kg/m².    Intake/Output Summary (Last 24 hours) at 7/18/2023 1646  Last data filed at 7/18/2023 1216  Gross per 24 hour   Intake 2264.59 ml   Output 3800 ml   Net -1535.41 ml         Physical Exam  Vitals and nursing note reviewed.   Constitutional:       General: He is not in acute distress.     Appearance: He is obese. He is ill-appearing.   HENT:      Mouth/Throat:      Mouth: Mucous membranes are moist.   Eyes:      Extraocular Movements: Extraocular movements intact.    Cardiovascular:      Rate and Rhythm: Normal rate and regular rhythm.      Heart sounds: No murmur heard.    No gallop.   Pulmonary:      Effort: Pulmonary effort is normal. No respiratory distress.      Breath sounds: No wheezing.   Abdominal:      General: Bowel sounds are normal. There is no distension.      Tenderness: There is no abdominal tenderness. There is no guarding.   Musculoskeletal:         General: Swelling present.   Skin:     General: Skin is warm.      Findings: Rash present.      Comments: Noted lesion to right heel, right lateral calf as well as skin lesions to the right flank.  Refer to media images   Neurological:      Mental Status: He is alert and oriented to person, place, and time.      Motor: Weakness (generaized weakness) present.   Psychiatric:         Mood and Affect: Mood normal.         Behavior: Behavior normal.           Significant Labs: All pertinent labs within the past 24 hours have been reviewed.    Significant Imaging: I have reviewed all pertinent imaging results/findings within the past 24 hours.
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
## 2023-07-19 LAB
ALBUMIN SERPL BCP-MCNC: 2.2 G/DL (ref 3.5–5.2)
ALP SERPL-CCNC: 53 U/L (ref 55–135)
ALT SERPL W/O P-5'-P-CCNC: 16 U/L (ref 10–44)
ANION GAP SERPL CALC-SCNC: 10 MMOL/L (ref 8–16)
AST SERPL-CCNC: 14 U/L (ref 10–40)
BASOPHILS # BLD AUTO: 0.02 K/UL (ref 0–0.2)
BASOPHILS NFR BLD: 0.2 % (ref 0–1.9)
BILIRUB SERPL-MCNC: 0.8 MG/DL (ref 0.1–1)
BUN SERPL-MCNC: 80 MG/DL (ref 8–23)
CALCIUM SERPL-MCNC: 8.5 MG/DL (ref 8.7–10.5)
CHLORIDE SERPL-SCNC: 101 MMOL/L (ref 95–110)
CO2 SERPL-SCNC: 26 MMOL/L (ref 23–29)
CREAT SERPL-MCNC: 3 MG/DL (ref 0.5–1.4)
DIFFERENTIAL METHOD: ABNORMAL
EOSINOPHIL # BLD AUTO: 0.1 K/UL (ref 0–0.5)
EOSINOPHIL NFR BLD: 1.1 % (ref 0–8)
ERYTHROCYTE [DISTWIDTH] IN BLOOD BY AUTOMATED COUNT: 13.5 % (ref 11.5–14.5)
EST. GFR  (NO RACE VARIABLE): 21 ML/MIN/1.73 M^2
GLUCOSE SERPL-MCNC: 148 MG/DL (ref 70–110)
HCT VFR BLD AUTO: 29.3 % (ref 40–54)
HGB BLD-MCNC: 9.8 G/DL (ref 14–18)
IMM GRANULOCYTES # BLD AUTO: 0.08 K/UL (ref 0–0.04)
IMM GRANULOCYTES NFR BLD AUTO: 0.9 % (ref 0–0.5)
IRON SERPL-MCNC: 17 UG/DL (ref 45–160)
LYMPHOCYTES # BLD AUTO: 0.7 K/UL (ref 1–4.8)
LYMPHOCYTES NFR BLD: 7.5 % (ref 18–48)
MAGNESIUM SERPL-MCNC: 1.9 MG/DL (ref 1.6–2.6)
MCH RBC QN AUTO: 26.8 PG (ref 27–31)
MCHC RBC AUTO-ENTMCNC: 33.4 G/DL (ref 32–36)
MCV RBC AUTO: 80 FL (ref 82–98)
MONOCYTES # BLD AUTO: 0.8 K/UL (ref 0.3–1)
MONOCYTES NFR BLD: 9.2 % (ref 4–15)
NEUTROPHILS # BLD AUTO: 7.4 K/UL (ref 1.8–7.7)
NEUTROPHILS NFR BLD: 81.1 % (ref 38–73)
NRBC BLD-RTO: 0 /100 WBC
PHOSPHATE SERPL-MCNC: 2.7 MG/DL (ref 2.7–4.5)
PLATELET # BLD AUTO: 162 K/UL (ref 150–450)
PMV BLD AUTO: 8.9 FL (ref 9.2–12.9)
POCT GLUCOSE: 142 MG/DL (ref 70–110)
POCT GLUCOSE: 272 MG/DL (ref 70–110)
POCT GLUCOSE: 294 MG/DL (ref 70–110)
POCT GLUCOSE: 306 MG/DL (ref 70–110)
POTASSIUM SERPL-SCNC: 3.3 MMOL/L (ref 3.5–5.1)
PROT SERPL-MCNC: 5.5 G/DL (ref 6–8.4)
RBC # BLD AUTO: 3.66 M/UL (ref 4.6–6.2)
SATURATED IRON: 8 % (ref 20–50)
SODIUM SERPL-SCNC: 137 MMOL/L (ref 136–145)
TOTAL IRON BINDING CAPACITY: 207 UG/DL (ref 250–450)
TRANSFERRIN SERPL-MCNC: 140 MG/DL (ref 200–375)
VANCOMYCIN SERPL-MCNC: 13.8 UG/ML
WBC # BLD AUTO: 9.06 K/UL (ref 3.9–12.7)

## 2023-07-19 PROCEDURE — 97110 THERAPEUTIC EXERCISES: CPT | Mod: CQ

## 2023-07-19 PROCEDURE — 25000003 PHARM REV CODE 250: Performed by: STUDENT IN AN ORGANIZED HEALTH CARE EDUCATION/TRAINING PROGRAM

## 2023-07-19 PROCEDURE — 97535 SELF CARE MNGMENT TRAINING: CPT

## 2023-07-19 PROCEDURE — 83735 ASSAY OF MAGNESIUM: CPT | Performed by: STUDENT IN AN ORGANIZED HEALTH CARE EDUCATION/TRAINING PROGRAM

## 2023-07-19 PROCEDURE — 84466 ASSAY OF TRANSFERRIN: CPT | Performed by: EMERGENCY MEDICINE

## 2023-07-19 PROCEDURE — 84100 ASSAY OF PHOSPHORUS: CPT | Performed by: STUDENT IN AN ORGANIZED HEALTH CARE EDUCATION/TRAINING PROGRAM

## 2023-07-19 PROCEDURE — 80053 COMPREHEN METABOLIC PANEL: CPT | Performed by: STUDENT IN AN ORGANIZED HEALTH CARE EDUCATION/TRAINING PROGRAM

## 2023-07-19 PROCEDURE — 97116 GAIT TRAINING THERAPY: CPT | Mod: CQ

## 2023-07-19 PROCEDURE — 36415 COLL VENOUS BLD VENIPUNCTURE: CPT | Performed by: EMERGENCY MEDICINE

## 2023-07-19 PROCEDURE — 85025 COMPLETE CBC W/AUTO DIFF WBC: CPT | Performed by: STUDENT IN AN ORGANIZED HEALTH CARE EDUCATION/TRAINING PROGRAM

## 2023-07-19 PROCEDURE — 11000001 HC ACUTE MED/SURG PRIVATE ROOM

## 2023-07-19 PROCEDURE — 63600175 PHARM REV CODE 636 W HCPCS: Performed by: STUDENT IN AN ORGANIZED HEALTH CARE EDUCATION/TRAINING PROGRAM

## 2023-07-19 PROCEDURE — 25000003 PHARM REV CODE 250: Performed by: EMERGENCY MEDICINE

## 2023-07-19 PROCEDURE — 80202 ASSAY OF VANCOMYCIN: CPT | Performed by: EMERGENCY MEDICINE

## 2023-07-19 PROCEDURE — 63600175 PHARM REV CODE 636 W HCPCS: Performed by: EMERGENCY MEDICINE

## 2023-07-19 PROCEDURE — 97530 THERAPEUTIC ACTIVITIES: CPT

## 2023-07-19 RX ORDER — SODIUM CHLORIDE 9 MG/ML
INJECTION, SOLUTION INTRAVENOUS CONTINUOUS
Status: ACTIVE | OUTPATIENT
Start: 2023-07-19 | End: 2023-07-19

## 2023-07-19 RX ORDER — POTASSIUM CHLORIDE 20 MEQ/1
40 TABLET, EXTENDED RELEASE ORAL ONCE
Status: COMPLETED | OUTPATIENT
Start: 2023-07-19 | End: 2023-07-19

## 2023-07-19 RX ORDER — MUPIROCIN 20 MG/G
OINTMENT TOPICAL 2 TIMES DAILY
Status: DISPENSED | OUTPATIENT
Start: 2023-07-19 | End: 2023-07-24

## 2023-07-19 RX ADMIN — HYDRALAZINE HYDROCHLORIDE 75 MG: 25 TABLET, FILM COATED ORAL at 09:07

## 2023-07-19 RX ADMIN — MUPIROCIN: 20 OINTMENT TOPICAL at 09:07

## 2023-07-19 RX ADMIN — TRAMADOL HYDROCHLORIDE 50 MG: 50 TABLET, COATED ORAL at 09:07

## 2023-07-19 RX ADMIN — VALACYCLOVIR HYDROCHLORIDE 1000 MG: 500 TABLET, FILM COATED ORAL at 08:07

## 2023-07-19 RX ADMIN — PIPERACILLIN AND TAZOBACTAM 4.5 G: 4; .5 INJECTION, POWDER, LYOPHILIZED, FOR SOLUTION INTRAVENOUS; PARENTERAL at 01:07

## 2023-07-19 RX ADMIN — SODIUM CHLORIDE: 9 INJECTION, SOLUTION INTRAVENOUS at 01:07

## 2023-07-19 RX ADMIN — HYDRALAZINE HYDROCHLORIDE 75 MG: 25 TABLET, FILM COATED ORAL at 02:07

## 2023-07-19 RX ADMIN — APIXABAN 5 MG: 5 TABLET, FILM COATED ORAL at 08:07

## 2023-07-19 RX ADMIN — PIPERACILLIN AND TAZOBACTAM 4.5 G: 4; .5 INJECTION, POWDER, LYOPHILIZED, FOR SOLUTION INTRAVENOUS; PARENTERAL at 03:07

## 2023-07-19 RX ADMIN — PIPERACILLIN AND TAZOBACTAM 4.5 G: 4; .5 INJECTION, POWDER, LYOPHILIZED, FOR SOLUTION INTRAVENOUS; PARENTERAL at 09:07

## 2023-07-19 RX ADMIN — TAMSULOSIN HYDROCHLORIDE 0.4 MG: 0.4 CAPSULE ORAL at 08:07

## 2023-07-19 RX ADMIN — POTASSIUM CHLORIDE 40 MEQ: 1500 TABLET, EXTENDED RELEASE ORAL at 08:07

## 2023-07-19 RX ADMIN — METOPROLOL SUCCINATE 25 MG: 25 TABLET, EXTENDED RELEASE ORAL at 08:07

## 2023-07-19 RX ADMIN — AMIODARONE HYDROCHLORIDE 200 MG: 200 TABLET ORAL at 08:07

## 2023-07-19 RX ADMIN — TRAMADOL HYDROCHLORIDE 50 MG: 50 TABLET, COATED ORAL at 05:07

## 2023-07-19 RX ADMIN — APIXABAN 5 MG: 5 TABLET, FILM COATED ORAL at 09:07

## 2023-07-19 RX ADMIN — INSULIN ASPART 3 UNITS: 100 INJECTION, SOLUTION INTRAVENOUS; SUBCUTANEOUS at 09:07

## 2023-07-19 RX ADMIN — HYDRALAZINE HYDROCHLORIDE 75 MG: 25 TABLET, FILM COATED ORAL at 05:07

## 2023-07-19 RX ADMIN — VANCOMYCIN HYDROCHLORIDE 750 MG: 750 INJECTION, POWDER, LYOPHILIZED, FOR SOLUTION INTRAVENOUS at 08:07

## 2023-07-19 NOTE — NURSING
[facility] [place]  Nurses Note -- 4 Eyes      7/19/2023       Skin assessed on: Q Shift      [] No Pressure Injuries Present    []Prevention Measures Documented    [x] Yes LDA  for Pressure Injury Previously documented     [] Yes New Pressure Injury Discovered   [] LDA for New Pressure Injury Added      Attending RN:  Karen Ramírez RN     Second RN:  IVONNE Velasquez

## 2023-07-19 NOTE — PROGRESS NOTES
Pharmacokinetic Assessment Follow Up: IV Vancomycin    Vancomycin serum concentration assessment(s):    The random level was drawn correctly and can be used to guide therapy at this time. The measurement is within the desired definitive target range of 10 to 20 mcg/mL.    Vancomycin Regimen Plan:    Give 750 mg today.  Re-dose when the random level is less than 20 mcg/mL, next level to be drawn at 0400 on 7/20/2023    Drug levels (last 3 results):  Recent Labs   Lab Result Units 07/18/23  0606 07/19/23  0548   Vancomycin, Random ug/mL 15.9 13.8       Pharmacy will continue to follow and monitor vancomycin.    Please contact pharmacy at extension [phone] for questions regarding this assessment.    Thank you for the consult,   Ryan Walker Jr       Patient brief summary:  Chato Bowie is a 72 y.o. male initiated on antimicrobial therapy with IV Vancomycin for treatment of sepsis    Drug Allergies:   Review of patient's allergies indicates:  No Known Allergies    Actual Body Weight:   99.2 kg    Renal Function:   Estimated Creatinine Clearance: 26.7 mL/min (A) (based on SCr of 3 mg/dL (H)).,     Dialysis Method (if applicable):  N/A    CBC (last 72 hours):  Recent Labs   Lab Result Units 07/17/23  1234 07/18/23  0605 07/19/23  0548   WBC K/uL 17.40* 13.52* 9.06   Hemoglobin g/dL 11.3* 10.9* 9.8*   Hematocrit % 34.7* 32.7* 29.3*   Platelets K/uL 203 152 162   Gran % % 83.7* 84.4* 81.1*   Lymph % % 5.3* 4.4* 7.5*   Mono % % 9.5 9.5 9.2   Eosinophil % % 0.1 0.7 1.1   Basophil % % 0.3 0.1 0.2   Differential Method  Automated Automated Automated       Metabolic Panel (last 72 hours):  Recent Labs   Lab Result Units 07/17/23  1234 07/17/23  1334 07/18/23  0605 07/19/23  0548   Sodium mmol/L 135*  --  137 137   Potassium mmol/L 3.4*  --  3.3* 3.3*   Chloride mmol/L 96  --  100 101   CO2 mmol/L 24  --  25 26   Glucose mg/dL 126*  --  164* 148*   Glucose, UA   --  4+*  --   --    BUN mg/dL 114*  --  94* 80*   Creatinine  mg/dL 5.4*  --  3.9* 3.0*   Albumin g/dL 3.4*  --  2.6* 2.2*   Total Bilirubin mg/dL 1.2*  --  0.9 0.8   Alkaline Phosphatase U/L 68  --  60 53*   AST U/L 18  --  19 14   ALT U/L 18  --  18 16   Magnesium mg/dL 2.2  --  2.1 1.9   Phosphorus mg/dL  --   --  4.1 2.7       Vancomycin Administrations:  vancomycin given in the last 96 hours                     vancomycin (VANCOCIN) 500 mg in dextrose 5 % in water (D5W) 5 % 100 mL IVPB (MB+) (mg) 500 mg New Bag 07/18/23 0938    vancomycin (VANCOCIN) 2,000 mg in dextrose 5 % (D5W) 500 mL IVPB (mg) 2,000 mg New Bag 07/17/23 1307                    Microbiologic Results:  Microbiology Results (last 7 days)       Procedure Component Value Units Date/Time    Blood culture x two cultures. Draw prior to antibiotics. [[id number]] Collected: 07/17/23 1241    Order Status: Completed Specimen: Blood from Peripheral, Hand, Right Updated: 07/18/23 1303     Blood Culture, Routine No Growth to date      No Growth to date    Narrative:      Aerobic and anaerobic    Blood culture x two cultures. Draw prior to antibiotics. [[id number]] Collected: 07/17/23 1239    Order Status: Completed Specimen: Blood from Peripheral, Antecubital, Left Updated: 07/18/23 1303     Blood Culture, Routine No Growth to date      No Growth to date    Narrative:      Aerobic and anaerobic

## 2023-07-19 NOTE — ASSESSMENT & PLAN NOTE
Patient's FSGs are controlled on current medication regimen.  Last A1c reviewed-   Lab Results   Component Value Date    HGBA1C 7.8 (H) 06/02/2023     Most recent fingerstick glucose reviewed-   Recent Labs   Lab 07/18/23  1627 07/18/23  1903 07/19/23  0717 07/19/23  1141   POCTGLUCOSE 260* 249* 142* 294*     Current correctional scale  Low  Maintain anti-hyperglycemic dose as follows-   Antihyperglycemics (From admission, onward)    Start     Stop Route Frequency Ordered    07/17/23 1738  insulin aspart U-100 pen 0-5 Units         -- SubQ Before meals & nightly PRN 07/17/23 1641        Hold Oral hypoglycemics while patient is in the hospital.

## 2023-07-19 NOTE — PLAN OF CARE
Chart check complete, pt resting comfortably.  Rash to right leg and buttocks open to air.  Tele box monitored.  De Leon to gravity, draining well.  BG monitored.  Telesitter at the bedside for safety.  PRN pain medication given for pain with moderate relief.  No acute distress noted, pt free from falls or injury this shift.  Bed in low position, wheels locked, call light in reach for assistance, will continue to monitor.      Problem: Adult Inpatient Plan of Care  Goal: Plan of Care Review  Outcome: Ongoing, Progressing     Problem: Adult Inpatient Plan of Care  Goal: Patient-Specific Goal (Individualized)  Outcome: Ongoing, Progressing     Problem: Adult Inpatient Plan of Care  Goal: Absence of Hospital-Acquired Illness or Injury  Outcome: Ongoing, Progressing     Problem: Adult Inpatient Plan of Care  Goal: Optimal Comfort and Wellbeing  Outcome: Ongoing, Progressing     Problem: Infection  Goal: Absence of Infection Signs and Symptoms  Outcome: Ongoing, Progressing     Problem: Diabetes Comorbidity  Goal: Blood Glucose Level Within Targeted Range  Outcome: Ongoing, Progressing     Problem: Glycemic Control Impaired (Sepsis/Septic Shock)  Goal: Blood Glucose Level Within Desired Range  Outcome: Ongoing, Progressing     Problem: Renal Function Impairment (Acute Kidney Injury/Impairment)  Goal: Effective Renal Function  Outcome: Ongoing, Progressing     Problem: Impaired Wound Healing  Goal: Optimal Wound Healing  Outcome: Ongoing, Progressing

## 2023-07-19 NOTE — PROGRESS NOTES
[facility]  Progress Note    Patient Name: Chato Bowie  MRN: [id number]  Patient Class: IP- Inpatient   Admission Date: 7/17/2023  Length of Stay: 2 days  Attending Physician: Tiffanie Rojas DO  Primary Care Provider: Irlanda Valenzuela        Subjective:     Principal Problem:Sepsis        HPI:  Mr. Bowie is a 72-year-old with extensive past medical history including CHF, arthritis, coronary artery disease, diabetes mellitus type 2, hypertension and hyperlipidemia who presents to the emergency department for evaluation of pain and swelling.  Patient was recently diagnosed with shingles and sent home with foul acyclovir.  Patient was found at home unable to get up from his chair and sitting in urine and feces.  Patient is not able to give much history in the emergency department so most of this history was obtained from medical review and providers in the emergency department.    In the emergency department, patient was found to be febrile of 100.8 with initial blood pressure 125/68 saturating 98% on room air.  Lab work revealed a leukocytosis of 17.4, acute on chronic kidney disease with creatinine 5.4  with elevated procalcitonin.  Patient underwent ultrasound of lower extremities which did not reveal DVT.  CT abdomen and pelvis did not show any acute intra-abdominal abnormalities but does note soft tissue edema in the bilateral hip regions.  Patient also found to have concerns for infection of right flank and hip wounds.  Patient admitted to hospital medicine for further management.        Overview/Hospital Course:   72-year-old with extensive past medical history including CHF, arthritis, coronary artery disease, diabetes mellitus type 2, hypertension and hyperlipidemia admitted on 7/17/2023 for sepsis likely secondary to shingles and overlying cellulitis and acute kidney injury. Stated on IV antibiotics and continued on valacyclovir for shingles treatment. Wound care  consulted. Also found to have elevated troponin and hypokalemia.  Electrolytes replaced as needed.  Suspect troponin elevation due to demand in the setting of sepsis and ELIZABETH.  ECG reviewed and read as unusual P axis. Has known LBBB. Recent echo with normal EF and grade II diastolic dysfunction.  Troponin trended down.  Patient without any chest pain or shortness of breath. ELIZABETH and leukocytosis improving. PT/OT consulted-recommends SNF. Patient unable to care for himself, will likely need to transition to [facility].       Interval History:  No acute overnight events.  Patient remained afebrile overnight.  Continues to complain of pain to right buttock and right thigh pain from rash but improving.  Complains of generalized weakness. Denies any dysuria, difficulty urinating, or hematuria.  Alert oriented x4.    Review of Systems   Constitutional:  Negative for chills, fatigue and fever.   Respiratory:  Negative for cough and shortness of breath.    Cardiovascular:  Positive for leg swelling (chronic, history of anarsarca). Negative for chest pain and palpitations.   Gastrointestinal:  Negative for abdominal pain, diarrhea, nausea and vomiting.   Genitourinary:  Negative for difficulty urinating, dysuria, frequency and hematuria.   Musculoskeletal:  Positive for gait problem and myalgias. Negative for joint swelling.   Skin:  Positive for rash.   Neurological:  Positive for weakness. Negative for dizziness and headaches.     Objective:     Vital Signs (Most Recent):  Temp: 98 °F (36.7 °C) (07/19/23 0716)  Pulse: 63 (07/19/23 0716)  Resp: 18 (07/19/23 0716)  BP: (!) 149/67 (07/19/23 0716)  SpO2: 98 % (07/19/23 0756) Vital Signs (24h Range):  Temp:  [97.7 °F (36.5 °C)-99 °F (37.2 °C)] 98 °F (36.7 °C)  Pulse:  [60-68] 63  Resp:  [15-20] 18  SpO2:  [96 %-100 %] 98 %  BP: (130-162)/(61-70) 149/67     Weight: 99.2 kg (218 lb 11.2 oz)  Body mass index is 30.5 kg/m².    Intake/Output Summary (Last 24 hours) at 7/19/2023  1136  Last data filed at 7/19/2023 0915  Gross per 24 hour   Intake 1637.6 ml   Output 2150 ml   Net -512.4 ml           Physical Exam  Vitals and nursing note reviewed.   Constitutional:       General: He is not in acute distress.     Appearance: He is obese. He is ill-appearing.   HENT:      Mouth/Throat:      Mouth: Mucous membranes are moist.   Eyes:      Extraocular Movements: Extraocular movements intact.   Cardiovascular:      Rate and Rhythm: Normal rate and regular rhythm.      Heart sounds: No murmur heard.    No gallop.   Pulmonary:      Effort: Pulmonary effort is normal. No respiratory distress.      Breath sounds: No wheezing.   Abdominal:      General: Bowel sounds are normal. There is no distension.      Tenderness: There is no abdominal tenderness. There is no guarding.   Musculoskeletal:         General: Swelling present.   Skin:     General: Skin is warm.      Findings: Rash present.      Comments: Noted lesion to right heel, right lateral calf as well as skin lesions to the right flank.  Refer to media images   Neurological:      Mental Status: He is alert and oriented to person, place, and time.      Motor: Weakness (generaized weakness) present.   Psychiatric:         Mood and Affect: Mood normal.         Behavior: Behavior normal.           Significant Labs: All pertinent labs within the past 24 hours have been reviewed.    Significant Imaging: I have reviewed all pertinent imaging results/findings within the past 24 hours.      Assessment/Plan:      * Sepsis  This patient does have evidence of infective focus  My overall impression is sepsis.  Source: left knee vs superimposed cellulitis (shingles).  Suspect source is cellulitis at this time.  Antibiotics given-   Antibiotics (72h ago, onward)    Start     Stop Route Frequency Ordered    07/19/23 0900  mupirocin 2 % ointment         07/24 0859 Nasl 2 times daily 07/19/23 0656    07/17/23 1251  vancomycin - pharmacy to dose  (vancomycin IVPB  (PEDS and ADULTS))        See [facility]erspace for full Linked Orders Report.    -- IV pharmacy to manage frequency 07/17/23 1152    07/17/23 1230  piperacillin-tazobactam (ZOSYN) 4.5 g in dextrose 5 % in water (D5W) 5 % 100 mL IVPB (MB+)         -- IV Every 8 hours (non-standard times) 07/17/23 1123        Latest lactate reviewed-  Recent Labs   Lab 07/17/23  1234 07/17/23  1753   LACTATE 1.8 1.3     Organ dysfunction indicated by Acute kidney injury and Encephalopathy    Fluid challenge given 2 liters NS in ED - not hypotensive    Post- resuscitation assessment Yes Perfusion exam was performed within 6 hours of septic shock presentation after bolus shows Adequate tissue perfusion assessed by non-invasive monitoring       Will Not start Pressors  Source control achieved by: IV antibiotics    -Presented febrile, tachypneic and with leukocytosis  -Also have ELIZABETH on admission  -recently diagnosed with shingles, but patient has not started his prescription of the valacyclovir outpatient  -blood culture with no growth to date.    -continue valacyclovir for shingles and Zosyn for superimposed cellulitis  -Leukocytosis resolved 07/19    Acute kidney injury superimposed on chronic kidney disease  -Presents with  and creatinine of 5.4 with baseline of 2.5-2.8.    -BNP 99 when usually patient has BNP greater than 300.    -Likely volume depletion in the setting of sepsis and poor p.o. intake.    -He was given 2 L normal saline in the emergency department.  Currently with good urine output.  -De Leon catheter in place.  -Renal US pending   -Continue to monitor, if no improvement plans for Nephrology consult  -Recheck lab work in the morning  -Cr improving     Skin rash  -Diagnosed with shingles and prescribed valacyclovir outpatient   -He states that this was picked up but did not start treatment    -Continue renal dose valacyclovir as well as IV antibiotics for superimposed bacterial infection and source of sepsis.  Precautions  noted.  -Wound care consult      Hypokalemia  -Replace as needed      Essential hypertension  -blood pressure elevated, patient is stable   -resume home hydralazine, metoprolol succinate, and amiodarone      Acute encephalopathy  Patient appears encephalopathic and different from his usual baseline on admission.  His BUN is 114 so uremic encephalopathy is on differential as well as sepsis.  Suspect these are etiology but will CT head for completeness.  - Ct head: No acute intracranial abnormalities identified.  No significant detrimental change compared to recent CT head 06/01/2023.   - AOx4, mental status normal and at baseline on 07/18  -Resolved      Elevated troponin  -Presents with sepsis and found to have elevated troponin of 0.183.   -Denies chest pain. ECG reviewed and read as unusual P axis. Has known LBBB.   -Recent echo with normal EF and grade II diastolic dysfunction.   -Suspect this is demand in setting of sepsis.   -trend troponin, downtrend      Type 2 diabetes mellitus with hyperglycemia  Patient's FSGs are controlled on current medication regimen.  Last A1c reviewed-   Lab Results   Component Value Date    HGBA1C 7.8 (H) 06/02/2023     Most recent fingerstick glucose reviewed-   Recent Labs   Lab 07/18/23  1627 07/18/23  1903 07/19/23  0717 07/19/23  1141   POCTGLUCOSE 260* 249* 142* 294*     Current correctional scale  Low  Maintain anti-hyperglycemic dose as follows-   Antihyperglycemics (From admission, onward)    Start     Stop Route Frequency Ordered    07/17/23 1738  insulin aspart U-100 pen 0-5 Units         -- SubQ Before meals & nightly PRN 07/17/23 1641        Hold Oral hypoglycemics while patient is in the hospital.    Anasarca  This is chronic though patient appears volume depleted in the setting of sepsis.    Continue to monitor.      Left knee pain  -This is chronic, left knee appears swollen but did not appear erythematous or warm.    -XR left knee: No evidence of acute displaced  fracture, dislocation, or osseous destructive process.  Severe joint space narrowing is seen at the medial tibiofemoral compartment.  There is significant suprapatellar joint effusion      DJD (degenerative joint disease)  -Chronic issues, particularly left knee.  -XR of left knee: No evidence of acute displaced fracture, dislocation, or osseous destructive process.  Severe joint space narrowing is seen at the medial tibiofemoral compartment.  There is significant suprapatellar joint effusion      BPH (benign prostatic hyperplasia)  De Leon in place, resume Flomax      Falls  -PT/OT consulted: recommends SNF  -Awaiting placement     Class 1 obesity due to excess calories with serious comorbidity and body mass index (BMI) of 30.0 to 30.9 in adult  Body mass index is 30.5 kg/m². Morbid obesity complicates all aspects of disease management from diagnostic modalities to treatment. Weight loss encouraged and health benefits explained to patient.           VTE Risk Mitigation (From admission, onward)         Ordered     apixaban tablet 5 mg  2 times daily         07/17/23 1641     IP VTE HIGH RISK PATIENT  Once         07/17/23 1641     Place sequential compression device  Until discontinued         07/17/23 1641                Discharge Planning   ELY: 7/21/2023     Code Status: Full Code   Is the patient medically ready for discharge?:     Reason for patient still in hospital (select all that apply): Treatment, Imaging, Consult recommendations and PT / OT recommendations  Discharge Plan A: Assisted Living   Discharge Delays: (!) Post-Acute Set-up              Tiffanie Rojas DO  Department of Hospital Medicine   [facility] - Med Surg

## 2023-07-19 NOTE — ASSESSMENT & PLAN NOTE
This patient does have evidence of infective focus  My overall impression is sepsis.  Source: left knee vs superimposed cellulitis (shingles).  Suspect source is cellulitis at this time.  Antibiotics given-   Antibiotics (72h ago, onward)    Start     Stop Route Frequency Ordered    07/19/23 0900  mupirocin 2 % ointment         07/24 0859 Nasl 2 times daily 07/19/23 0656    07/17/23 1251  vancomycin - pharmacy to dose  (vancomycin IVPB (PEDS and ADULTS))        See [place] for full Linked Orders Report.    -- IV pharmacy to manage frequency 07/17/23 1152    07/17/23 1230  piperacillin-tazobactam (ZOSYN) 4.5 g in dextrose 5 % in water (D5W) 5 % 100 mL IVPB (MB+)         -- IV Every 8 hours (non-standard times) 07/17/23 1123        Latest lactate reviewed-  Recent Labs   Lab 07/17/23  1234 07/17/23  1753   LACTATE 1.8 1.3     Organ dysfunction indicated by Acute kidney injury and Encephalopathy    Fluid challenge given 2 liters NS in ED - not hypotensive    Post- resuscitation assessment Yes Perfusion exam was performed within 6 hours of septic shock presentation after bolus shows Adequate tissue perfusion assessed by non-invasive monitoring       Will Not start Pressors  Source control achieved by: IV antibiotics    -Presented febrile, tachypneic and with leukocytosis  -Also have ELIZABETH on admission  -recently diagnosed with shingles, but patient has not started his prescription of the valacyclovir outpatient  -blood culture with no growth to date.    -continue valacyclovir for shingles and Zosyn for superimposed cellulitis  -Leukocytosis resolved 07/19

## 2023-07-19 NOTE — NURSING
Pt off the unit going to US by bed via transport. IV access in place, saline locked. NAD or pain noted.

## 2023-07-19 NOTE — NURSING
Pt back to unit from US by bed via transport. IV access in place, saline locked. NAD or pain noted.

## 2023-07-19 NOTE — ASSESSMENT & PLAN NOTE
-Presents with  and creatinine of 5.4 with baseline of 2.5-2.8.    -BNP 99 when usually patient has BNP greater than 300.    -Likely volume depletion in the setting of sepsis and poor p.o. intake.    -He was given 2 L normal saline in the emergency department.  Currently with good urine output.  -De Leon catheter in place.  -Renal US pending   -Continue to monitor, if no improvement plans for Nephrology consult  -Recheck lab work in the morning  -Cr improving

## 2023-07-19 NOTE — PLAN OF CARE
Problem: Physical Therapy  Goal: Physical Therapy Goal  Description: Goals to be met by: 23     Patient will increase functional independence with mobility by performin. Supine to sit with Modified [place]  2. Rolling to Left and Right with Modified [place].  3. Sit to stand transfer with Modified [place]  4. Bed to chair transfer with Modified [place]    5. Gait  x 5-10 feet with Modified [place] using Rolling Walker.   6. Wheelchair propulsion x50-100 feet with Modified [place]   7. Lower extremity exercise program x10 reps per handout, with independence    Outcome: Ongoing, Progressing   Pt is more alert today and willing to participate. Pt c/o pain to Lateral side of R Hip by the old blister at, nurse notified. Pt demo improvement with his Bed Mobility/Transfer Activity today and required less assistance. Pt was able to ambulate ~20 ft with CGA to Min A using RW, BS Chair followed by OT. Pt will cont to benefit from skilled therapy at SNF with multidisciplinary approach to reach goals.

## 2023-07-19 NOTE — PLAN OF CARE
Problem: Occupational Therapy  Goal: Occupational Therapy Goal  Description: Goals to be met by: 8/1/23     Patient will increase functional independence with ADLs by performing:    Feeding with [place].  UE Dressing with Modified [place].  LE Dressing with Modified [place] and Supervision.  Grooming while standing at sink with Contact Guard Assistance and Assistive Devices as needed.  Toileting from bedside commode with Set-up Assistance and Supervision for hygiene and clothing management.   Rolling to Bilateral with Modified [place].   Supine to sit with Modified [place] and Supervision.  Step transfer with Contact Guard Assistance  Toilet transfer to bedside commode with Contact Guard Assistance.  Upper extremity exercise program x15 reps per handout, with assistance as needed.    Outcome: Ongoing, Progressing   The patient was more alert and able to participate in UE therex while seated on the EOB. The patient remains appropriate for SNF.

## 2023-07-19 NOTE — ASSESSMENT & PLAN NOTE
-Diagnosed with shingles and prescribed valacyclovir outpatient   -He states that this was picked up but did not start treatment    -Continue renal dose valacyclovir as well as IV antibiotics for superimposed bacterial infection and source of sepsis.  Precautions noted.  -Wound care consult

## 2023-07-19 NOTE — SUBJECTIVE & OBJECTIVE
Interval History:  No acute overnight events.  Patient remained afebrile overnight.  Continues to complain of pain to right buttock and right thigh pain from rash but improving.  Complains of generalized weakness. Denies any dysuria, difficulty urinating, or hematuria.  Alert oriented x4.    Review of Systems   Constitutional:  Negative for chills, fatigue and fever.   Respiratory:  Negative for cough and shortness of breath.    Cardiovascular:  Positive for leg swelling (chronic, history of anarsarca). Negative for chest pain and palpitations.   Gastrointestinal:  Negative for abdominal pain, diarrhea, nausea and vomiting.   Genitourinary:  Negative for difficulty urinating, dysuria, frequency and hematuria.   Musculoskeletal:  Positive for gait problem and myalgias. Negative for joint swelling.   Skin:  Positive for rash.   Neurological:  Positive for weakness. Negative for dizziness and headaches.     Objective:     Vital Signs (Most Recent):  Temp: 98 °F (36.7 °C) (07/19/23 0716)  Pulse: 63 (07/19/23 0716)  Resp: 18 (07/19/23 0716)  BP: (!) 149/67 (07/19/23 0716)  SpO2: 98 % (07/19/23 0756) Vital Signs (24h Range):  Temp:  [97.7 °F (36.5 °C)-99 °F (37.2 °C)] 98 °F (36.7 °C)  Pulse:  [60-68] 63  Resp:  [15-20] 18  SpO2:  [96 %-100 %] 98 %  BP: (130-162)/(61-70) 149/67     Weight: 99.2 kg (218 lb 11.2 oz)  Body mass index is 30.5 kg/m².    Intake/Output Summary (Last 24 hours) at 7/19/2023 1136  Last data filed at 7/19/2023 0915  Gross per 24 hour   Intake 1637.6 ml   Output 2150 ml   Net -512.4 ml           Physical Exam  Vitals and nursing note reviewed.   Constitutional:       General: He is not in acute distress.     Appearance: He is obese. He is ill-appearing.   HENT:      Mouth/Throat:      Mouth: Mucous membranes are moist.   Eyes:      Extraocular Movements: Extraocular movements intact.   Cardiovascular:      Rate and Rhythm: Normal rate and regular rhythm.      Heart sounds: No murmur heard.    No  gallop.   Pulmonary:      Effort: Pulmonary effort is normal. No respiratory distress.      Breath sounds: No wheezing.   Abdominal:      General: Bowel sounds are normal. There is no distension.      Tenderness: There is no abdominal tenderness. There is no guarding.   Musculoskeletal:         General: Swelling present.   Skin:     General: Skin is warm.      Findings: Rash present.      Comments: Noted lesion to right heel, right lateral calf as well as skin lesions to the right flank.  Refer to media images   Neurological:      Mental Status: He is alert and oriented to person, place, and time.      Motor: Weakness (generaized weakness) present.   Psychiatric:         Mood and Affect: Mood normal.         Behavior: Behavior normal.           Significant Labs: All pertinent labs within the past 24 hours have been reviewed.    Significant Imaging: I have reviewed all pertinent imaging results/findings within the past 24 hours.

## 2023-07-19 NOTE — PLAN OF CARE
Problem: Adult Inpatient Plan of Care  Goal: Plan of Care Review  Outcome: Ongoing, Progressing  Goal: Patient-Specific Goal (Individualized)  Outcome: Ongoing, Progressing  Goal: Absence of Hospital-Acquired Illness or Injury  Outcome: Ongoing, Progressing  Goal: Optimal Comfort and Wellbeing  Outcome: Ongoing, Progressing  Goal: Readiness for Transition of Care  Outcome: Ongoing, Progressing     Problem: Infection  Goal: Absence of Infection Signs and Symptoms  Outcome: Ongoing, Progressing     Problem: Diabetes Comorbidity  Goal: Blood Glucose Level Within Targeted Range  Outcome: Ongoing, Progressing     Problem: Adjustment to Illness (Sepsis/Septic Shock)  Goal: Optimal Coping  Outcome: Ongoing, Progressing     Problem: Impaired Wound Healing  Goal: Optimal Wound Healing  Outcome: Ongoing, Progressing   Pt A&Ox4, free from falls/injury, and able to make needs known during shift. VSS. Pt had no c/o pain during shift. Telemetry monitoring continued on box #[id number], visible and audible on monitor at nurses' station, no acute distress noted, telesitter at the bedside. Bed locked and in lowest position. Bedside table and call light in reach. Will cont to monitor.

## 2023-07-19 NOTE — PT/OT/SLP PROGRESS
Physical Therapy Treatment    Patient Name:  Chato Bowie   MRN:  [id number]    Recommendations:     Discharge Recommendations: nursing facility, skilled  Discharge Equipment Recommendations: none  Barriers to discharge:  Pt is not functioning at Mod I prior level and is at increased risk for falls, readmission, and morbidity if he returns to prior living arrangements at present time    Assessment:     Chato Bowie is a 72 y.o. male admitted with a medical diagnosis of Sepsis.  He presents with the following impairments/functional limitations: weakness, impaired endurance, impaired functional mobility, gait instability, impaired balance, impaired self care skills, decreased coordination, decreased upper extremity function, decreased lower extremity function, decreased safety awareness, pain, decreased ROM, impaired skin, edema.    Pt is more alert today and willing to participate. Pt c/o pain to Lateral side of R Hip by the old blister at, nurse notified. Pt demo improvement with his Bed Mobility/Transfer Activity today and required less assistance, Min A x 2. Pt was able to ambulate ~20 ft with CGA to Min A using , BS Chair followed by OT. Pt will cont to benefit from skilled therapy at SNF with multidisciplinary approach to reach goals.    Rehab Prognosis: Good; patient would benefit from acute skilled PT services to address these deficits and reach maximum level of function.    Recent Surgery: * No surgery found *      Plan:     During this hospitalization, patient to be seen  (3-5x/wk) to address the identified rehab impairments via gait training, therapeutic activities, therapeutic exercises, neuromuscular re-education, wheelchair management/training and progress toward the following goals:    Plan of Care Expires:  08/01/23    Subjective     Chief Complaint: pain to lateral side of R Hip by the blister popped  Patient/Family Comments/goals: Pt agreed to participate.  Pain/Comfort:  Pain Rating 1:  (pt did  not rate)  Location - Side 1: Right  Location - Orientation 1: lateral  Location 1: hip  Pain Addressed 1: Reposition, Distraction, Cessation of Activity  Pain Rating Post-Intervention 1:  (pt did not rate)      Objective:     Communicated with nurse Singh prior to session.  Patient found HOB elevated with telemetry, bed alarm, pressure relief boots, jackson catheter, peripheral IV upon PT entry to room.     General Precautions: Standard, fall, diabetic  Orthopedic Precautions: N/A  Braces: N/A  Respiratory Status: Room air     Functional Mobility:  Bed Mobility:     Scooting: side scooting toward HOB (pt's L side) in sitting at EOB with stand by assistance  Supine to Sit: contact guard assistance with HOB elevated; v/c for   Sit to Supine: minimum assistance and of 2 persons  Transfers:  Gait belt don prior OOB activity   Sit to Stand: from EOB with minimum assistance and of 2 persons with rolling walker  Gait: Pt ambulated ~20 ft with CGA to Min A using RW, BS Chair followed by OT. Pt with flexed posture, decreased fanta/step length/weight shifting/foot off floor clearance; v/t cues for upright posture, increase step length, clear foot off floor, weight shifting, AD management.  Balance: Fair+ Sitting; Fair Standing      AM-PAC 6 CLICK MOBILITY  Turning over in bed (including adjusting bedclothes, sheets and blankets)?: 3  Sitting down on and standing up from a chair with arms (e.g., wheelchair, bedside commode, etc.): 3  Moving from lying on back to sitting on the side of the bed?: 3  Moving to and from a bed to a chair (including a wheelchair)?: 3  Need to walk in hospital room?: 3  Climbing 3-5 steps with a railing?: 1  Basic Mobility Total Score: 16       Treatment & Education:  Encouraged/educated pt on importance of Bed Mobility and performing BLE ex throughout the day, pt verbalized understanding.    BLE ex in seated 10 reps AP, LAQ, PS (having pt counting number out loud)    Patient left HOB  elevated with B Pressure Relief Boots, all lines intact, nurse notified, and transportation tech present to bring pt down for US.    GOALS:   Multidisciplinary Problems       Physical Therapy Goals          Problem: Physical Therapy    Goal Priority Disciplines Outcome Goal Variances Interventions   Physical Therapy Goal     PT, PT/OT Ongoing, Progressing     Description: Goals to be met by: 23     Patient will increase functional independence with mobility by performin. Supine to sit with Modified [place]  2. Rolling to Left and Right with Modified [place].  3. Sit to stand transfer with Modified [place]  4. Bed to chair transfer with Modified [place]    5. Gait  x 5-10 feet with Modified [place] using Rolling Walker.   6. Wheelchair propulsion x50-100 feet with Modified [place]   7. Lower extremity exercise program x10 reps per handout, with independence                         Time Tracking:     PT Received On: 23  PT Start Time: 930     PT Stop Time: 958  PT Total Time (min): 28 min     Billable Minutes: Gait Training 14 min and Therapeutic Exercise 14 min (Co-treated with OT)    Co- treatment performed due to patient's multiple deficits requiring two skilled therapists to appropriately and safely assess patient's strength and endurance while facilitating functional tasks in addition to accommodating for patient's activity tolerance      Treatment Type: Treatment  PT/PTA: PTA     Number of PTA visits since last PT visit: 2023   10-Apr-2022 05:33

## 2023-07-19 NOTE — NURSING
[facility] [place]  Nurses Note -- 4 Eyes      7/19/2023       Skin assessed on: Q Shift      [] No Pressure Injuries Present    []Prevention Measures Documented    [x] Yes LDA  for Pressure Injury Previously documented     [] Yes New Pressure Injury Discovered   [] LDA for New Pressure Injury Added      Attending RN:  Samantha Keith RN     Second RN:  JOYCE Jones

## 2023-07-19 NOTE — PLAN OF CARE
Case Management Re-assessment      PCP: Ziyad  Pharmacy: [facility]     Patient Arrived From: home  Existing Help at Home: None     Barriers to Discharge: None     Discharge Plan:               A. SNF              B. Return to assisted living    Plan for discharge to SNF. Spoke with patient regarding dc planning. Informed of recommendation for SNF and reviewed as next level of care. Pt stated he does not feel safe to return home at this time and would like therapy before going home.     Per care port, no accepting facilities at this time. [place] to continue to follow up.      07/19/23 1149   Discharge Reassessment   Assessment Type Discharge Planning Reassessment   Did the patient's condition or plan change since previous assessment? Yes   Discharge Plan discussed with: Patient   Communicated ELY with patient/caregiver Yes   Transition of Care Barriers None   Why the patient remains in the hospital Requires continued medical care   Post-Acute Status   Post-Acute Authorization Placement   Post-Acute Placement Status Pending post-acute provider review/more information requested   Discharge Delays (!) Post-Acute Set-up

## 2023-07-19 NOTE — PT/OT/SLP PROGRESS
"Occupational Therapy   Treatment    Name: Chato Bowie  MRN: [id number]  Admitting Diagnosis:  Sepsis       Recommendations:     Discharge Recommendations: nursing facility, skilled  Discharge Equipment Recommendations:  none  Barriers to discharge:   (The patient is not at his PLOF and is not able to safely return home alone. The pateint has a hx of frequent hospital adm)    Assessment:     Chato Bowie is a 72 y.o. male with a medical diagnosis of Sepsis.   Performance deficits affecting function are impaired endurance, weakness, impaired self care skills, impaired functional mobility, impaired balance, gait instability, decreased coordination, decreased upper extremity function, decreased lower extremity function, decreased safety awareness, pain, decreased ROM, impaired skin, impaired cardiopulmonary response to activity.   The patient was more alert and able to participated in functional tasks while seated on the EOB. The patient was unable to sit in the chair 2* presence of transport to take the patient to testing. The patient will benefit from SNF.    Rehab Prognosis:  Good and Fair; patient would benefit from acute skilled OT services to address these deficits and reach maximum level of function.       Plan:     Patient to be seen 5 x/week to address the above listed problems via self-care/home management, therapeutic activities, therapeutic exercises  Plan of Care Expires: 08/01/23  Plan of Care Reviewed with: patient    Subjective     Chief Complaint: "I'm sick"  Patient/Family Comments/goals: agreeable to sit on the EOB  Pain/Comfort:  Pain Rating 1:  (yes-did not rate)  Location - Side 1: Right  Location - Orientation 1: lateral  Location 1: hip  Pain Addressed 1: Reposition, Distraction, Cessation of Activity    Objective:     Communicated with: Samantha banuelos prior to session.  Patient found left sidelying with telemetry, bed alarm, pressure relief boots, jackson catheter upon OT entry to room. " ([employer])    General Precautions: Standard, fall, diabetic    Orthopedic Precautions:N/A  Braces: N/A  Respiratory Status: Room air     Occupational Performance:     Bed Mobility:    Patient completed Rolling/Turning to Left with  minimum assistance  Patient completed Scooting/Bridging with contact guard assistance  Patient completed Supine to Sit with contact guard assistance, with side rail, and HOB elevated  Patient completed Sit to Supine with minimum assistance, 2 persons, and with leg lift     Functional Mobility/Transfers:  Patient completed Sit <> Stand Transfer with minimum assistance and of 2 persons  with  rolling walker and VC/CGA to perform rocking motion x3 to stand from the bed   Functional Mobility: CGA and VC to amb using a RW with min assist for RW management (see PT note)     Activities of Daily Living:  Grooming: stand by assistance to wipe hands and face while seated on the EOB  Upper Body Dressing: moderate assistance to don/doff back gown  Lower Body Dressing: dependence        [facility] 6 Click ADL: 14    Treatment & Education:  Performed self care and functional mobility as noted above  Performed B shoulder flex/ext x10 reps and B forward punches x20 reps while seated on the EOB  Re-oriented the patient to day and date      Patient left HOB elevated with all lines intact and call button in reach    GOALS:   Multidisciplinary Problems       Occupational Therapy Goals          Problem: Occupational Therapy    Goal Priority Disciplines Outcome Interventions   Occupational Therapy Goal     OT, PT/OT Ongoing, Progressing    Description: Goals to be met by: 8/1/23     Patient will increase functional independence with ADLs by performing:    Feeding with [place].  UE Dressing with Modified [place].  LE Dressing with Modified [place] and Supervision.  Grooming while standing at sink with Contact Guard Assistance and Assistive Devices as needed.  Toileting from bedside commode with Set-up  Assistance and Supervision for hygiene and clothing management.   Rolling to Bilateral with Modified [place].   Supine to sit with Modified [place] and Supervision.  Step transfer with Contact Guard Assistance  Toilet transfer to bedside commode with Contact Guard Assistance.  Upper extremity exercise program x15 reps per handout, with assistance as needed.                         Time Tracking:     OT Date of Treatment: 07/19/23  OT Start Time: 0930  OT Stop Time: 0958  OT Total Time (min): 28 min    Billable Minutes:Self Care/Home Management 14  Therapeutic Activity 14  Total Time 28 (with PT)    OT/JASON: OT          7/19/2023

## 2023-07-20 LAB
ALBUMIN SERPL BCP-MCNC: 2 G/DL (ref 3.5–5.2)
ALP SERPL-CCNC: 49 U/L (ref 55–135)
ALT SERPL W/O P-5'-P-CCNC: 14 U/L (ref 10–44)
ANION GAP SERPL CALC-SCNC: 9 MMOL/L (ref 8–16)
AST SERPL-CCNC: 11 U/L (ref 10–40)
BASOPHILS # BLD AUTO: 0.02 K/UL (ref 0–0.2)
BASOPHILS NFR BLD: 0.2 % (ref 0–1.9)
BILIRUB SERPL-MCNC: 0.8 MG/DL (ref 0.1–1)
BUN SERPL-MCNC: 59 MG/DL (ref 8–23)
CALCIUM SERPL-MCNC: 8 MG/DL (ref 8.7–10.5)
CHLORIDE SERPL-SCNC: 101 MMOL/L (ref 95–110)
CO2 SERPL-SCNC: 24 MMOL/L (ref 23–29)
CREAT SERPL-MCNC: 2.5 MG/DL (ref 0.5–1.4)
DIFFERENTIAL METHOD: ABNORMAL
EOSINOPHIL # BLD AUTO: 0.1 K/UL (ref 0–0.5)
EOSINOPHIL NFR BLD: 0.7 % (ref 0–8)
ERYTHROCYTE [DISTWIDTH] IN BLOOD BY AUTOMATED COUNT: 13.6 % (ref 11.5–14.5)
EST. GFR  (NO RACE VARIABLE): 27 ML/MIN/1.73 M^2
GLUCOSE SERPL-MCNC: 157 MG/DL (ref 70–110)
HCT VFR BLD AUTO: 24.6 % (ref 40–54)
HGB BLD-MCNC: 8 G/DL (ref 14–18)
IMM GRANULOCYTES # BLD AUTO: 0.06 K/UL (ref 0–0.04)
IMM GRANULOCYTES NFR BLD AUTO: 0.7 % (ref 0–0.5)
LYMPHOCYTES # BLD AUTO: 0.7 K/UL (ref 1–4.8)
LYMPHOCYTES NFR BLD: 8.1 % (ref 18–48)
MAGNESIUM SERPL-MCNC: 1.7 MG/DL (ref 1.6–2.6)
MCH RBC QN AUTO: 26.4 PG (ref 27–31)
MCHC RBC AUTO-ENTMCNC: 32.5 G/DL (ref 32–36)
MCV RBC AUTO: 81 FL (ref 82–98)
MONOCYTES # BLD AUTO: 1 K/UL (ref 0.3–1)
MONOCYTES NFR BLD: 11.6 % (ref 4–15)
NEUTROPHILS # BLD AUTO: 6.8 K/UL (ref 1.8–7.7)
NEUTROPHILS NFR BLD: 78.7 % (ref 38–73)
NRBC BLD-RTO: 0 /100 WBC
PHOSPHATE SERPL-MCNC: 2.1 MG/DL (ref 2.7–4.5)
PLATELET # BLD AUTO: 169 K/UL (ref 150–450)
PMV BLD AUTO: 9.1 FL (ref 9.2–12.9)
POCT GLUCOSE: 156 MG/DL (ref 70–110)
POCT GLUCOSE: 164 MG/DL (ref 70–110)
POCT GLUCOSE: 177 MG/DL (ref 70–110)
POCT GLUCOSE: 177 MG/DL (ref 70–110)
POTASSIUM SERPL-SCNC: 3.2 MMOL/L (ref 3.5–5.1)
PROT SERPL-MCNC: 5.1 G/DL (ref 6–8.4)
RBC # BLD AUTO: 3.03 M/UL (ref 4.6–6.2)
SODIUM SERPL-SCNC: 134 MMOL/L (ref 136–145)
VANCOMYCIN SERPL-MCNC: 14.3 UG/ML
WBC # BLD AUTO: 8.64 K/UL (ref 3.9–12.7)

## 2023-07-20 PROCEDURE — 80053 COMPREHEN METABOLIC PANEL: CPT | Performed by: STUDENT IN AN ORGANIZED HEALTH CARE EDUCATION/TRAINING PROGRAM

## 2023-07-20 PROCEDURE — 25000003 PHARM REV CODE 250: Performed by: EMERGENCY MEDICINE

## 2023-07-20 PROCEDURE — 25000003 PHARM REV CODE 250: Performed by: STUDENT IN AN ORGANIZED HEALTH CARE EDUCATION/TRAINING PROGRAM

## 2023-07-20 PROCEDURE — 27000207 HC ISOLATION

## 2023-07-20 PROCEDURE — 63600175 PHARM REV CODE 636 W HCPCS: Performed by: EMERGENCY MEDICINE

## 2023-07-20 PROCEDURE — 85025 COMPLETE CBC W/AUTO DIFF WBC: CPT | Performed by: STUDENT IN AN ORGANIZED HEALTH CARE EDUCATION/TRAINING PROGRAM

## 2023-07-20 PROCEDURE — 83735 ASSAY OF MAGNESIUM: CPT | Performed by: STUDENT IN AN ORGANIZED HEALTH CARE EDUCATION/TRAINING PROGRAM

## 2023-07-20 PROCEDURE — 63600175 PHARM REV CODE 636 W HCPCS: Performed by: STUDENT IN AN ORGANIZED HEALTH CARE EDUCATION/TRAINING PROGRAM

## 2023-07-20 PROCEDURE — 80202 ASSAY OF VANCOMYCIN: CPT | Performed by: EMERGENCY MEDICINE

## 2023-07-20 PROCEDURE — 84100 ASSAY OF PHOSPHORUS: CPT | Performed by: STUDENT IN AN ORGANIZED HEALTH CARE EDUCATION/TRAINING PROGRAM

## 2023-07-20 PROCEDURE — 11000001 HC ACUTE MED/SURG PRIVATE ROOM

## 2023-07-20 PROCEDURE — 36415 COLL VENOUS BLD VENIPUNCTURE: CPT | Performed by: STUDENT IN AN ORGANIZED HEALTH CARE EDUCATION/TRAINING PROGRAM

## 2023-07-20 RX ORDER — HYDRALAZINE HYDROCHLORIDE 25 MG/1
100 TABLET, FILM COATED ORAL EVERY 8 HOURS
Status: DISCONTINUED | OUTPATIENT
Start: 2023-07-20 | End: 2023-07-25 | Stop reason: HOSPADM

## 2023-07-20 RX ORDER — LANOLIN ALCOHOL/MO/W.PET/CERES
800 CREAM (GRAM) TOPICAL ONCE
Status: COMPLETED | OUTPATIENT
Start: 2023-07-20 | End: 2023-07-20

## 2023-07-20 RX ORDER — LANOLIN ALCOHOL/MO/W.PET/CERES
1 CREAM (GRAM) TOPICAL DAILY
Status: DISCONTINUED | OUTPATIENT
Start: 2023-07-20 | End: 2023-07-25 | Stop reason: HOSPADM

## 2023-07-20 RX ORDER — POTASSIUM CHLORIDE 20 MEQ/1
40 TABLET, EXTENDED RELEASE ORAL 2 TIMES DAILY
Status: COMPLETED | OUTPATIENT
Start: 2023-07-20 | End: 2023-07-20

## 2023-07-20 RX ORDER — SODIUM,POTASSIUM PHOSPHATES 280-250MG
2 POWDER IN PACKET (EA) ORAL
Status: DISPENSED | OUTPATIENT
Start: 2023-07-20 | End: 2023-07-21

## 2023-07-20 RX ORDER — HYDROCODONE BITARTRATE AND ACETAMINOPHEN 5; 325 MG/1; MG/1
1 TABLET ORAL EVERY 6 HOURS PRN
Status: DISCONTINUED | OUTPATIENT
Start: 2023-07-20 | End: 2023-07-23

## 2023-07-20 RX ORDER — HYDROCODONE BITARTRATE AND ACETAMINOPHEN 10; 325 MG/1; MG/1
1 TABLET ORAL EVERY 6 HOURS PRN
Status: DISCONTINUED | OUTPATIENT
Start: 2023-07-20 | End: 2023-07-23

## 2023-07-20 RX ADMIN — AMIODARONE HYDROCHLORIDE 200 MG: 200 TABLET ORAL at 08:07

## 2023-07-20 RX ADMIN — PIPERACILLIN AND TAZOBACTAM 4.5 G: 4; .5 INJECTION, POWDER, LYOPHILIZED, FOR SOLUTION INTRAVENOUS; PARENTERAL at 08:07

## 2023-07-20 RX ADMIN — Medication 2 PACKET: at 08:07

## 2023-07-20 RX ADMIN — POTASSIUM CHLORIDE 40 MEQ: 1500 TABLET, EXTENDED RELEASE ORAL at 08:07

## 2023-07-20 RX ADMIN — PIPERACILLIN AND TAZOBACTAM 4.5 G: 4; .5 INJECTION, POWDER, LYOPHILIZED, FOR SOLUTION INTRAVENOUS; PARENTERAL at 03:07

## 2023-07-20 RX ADMIN — Medication 800 MG: at 08:07

## 2023-07-20 RX ADMIN — POLYETHYLENE GLYCOL 3350 17 G: 17 POWDER, FOR SOLUTION ORAL at 08:07

## 2023-07-20 RX ADMIN — APIXABAN 5 MG: 5 TABLET, FILM COATED ORAL at 08:07

## 2023-07-20 RX ADMIN — Medication 2 PACKET: at 04:07

## 2023-07-20 RX ADMIN — TAMSULOSIN HYDROCHLORIDE 0.4 MG: 0.4 CAPSULE ORAL at 08:07

## 2023-07-20 RX ADMIN — METOPROLOL SUCCINATE 25 MG: 25 TABLET, EXTENDED RELEASE ORAL at 08:07

## 2023-07-20 RX ADMIN — HYDROCODONE BITARTRATE AND ACETAMINOPHEN 1 TABLET: 10; 325 TABLET ORAL at 08:07

## 2023-07-20 RX ADMIN — TRAMADOL HYDROCHLORIDE 50 MG: 50 TABLET, COATED ORAL at 04:07

## 2023-07-20 RX ADMIN — POTASSIUM CHLORIDE 10 MEQ: 750 TABLET, EXTENDED RELEASE ORAL at 09:07

## 2023-07-20 RX ADMIN — PIPERACILLIN AND TAZOBACTAM 4.5 G: 4; .5 INJECTION, POWDER, LYOPHILIZED, FOR SOLUTION INTRAVENOUS; PARENTERAL at 12:07

## 2023-07-20 RX ADMIN — FERROUS SULFATE TAB 325 MG (65 MG ELEMENTAL FE) 1 EACH: 325 (65 FE) TAB at 08:07

## 2023-07-20 RX ADMIN — HYDRALAZINE HYDROCHLORIDE 100 MG: 25 TABLET, FILM COATED ORAL at 02:07

## 2023-07-20 RX ADMIN — VANCOMYCIN HYDROCHLORIDE 750 MG: 750 INJECTION, POWDER, LYOPHILIZED, FOR SOLUTION INTRAVENOUS at 02:07

## 2023-07-20 RX ADMIN — HYDRALAZINE HYDROCHLORIDE 75 MG: 25 TABLET, FILM COATED ORAL at 05:07

## 2023-07-20 RX ADMIN — HYDRALAZINE HYDROCHLORIDE 100 MG: 25 TABLET, FILM COATED ORAL at 09:07

## 2023-07-20 RX ADMIN — VALACYCLOVIR HYDROCHLORIDE 1000 MG: 500 TABLET, FILM COATED ORAL at 08:07

## 2023-07-20 RX ADMIN — HYDROCODONE BITARTRATE AND ACETAMINOPHEN 1 TABLET: 10; 325 TABLET ORAL at 02:07

## 2023-07-20 RX ADMIN — TRAMADOL HYDROCHLORIDE 50 MG: 50 TABLET, COATED ORAL at 09:07

## 2023-07-20 NOTE — ASSESSMENT & PLAN NOTE
Patient's FSGs are controlled on current medication regimen.  Last A1c reviewed-   Lab Results   Component Value Date    HGBA1C 7.8 (H) 06/02/2023     Most recent fingerstick glucose reviewed-   Recent Labs   Lab 07/19/23  1631 07/19/23 2055 07/20/23  0725 07/20/23  1137   POCTGLUCOSE 272* 306* 177* 156*     Current correctional scale  Low  Maintain anti-hyperglycemic dose as follows-   Antihyperglycemics (From admission, onward)    Start     Stop Route Frequency Ordered    07/17/23 1738  insulin aspart U-100 pen 0-5 Units         -- SubQ Before meals & nightly PRN 07/17/23 1641        Hold Oral hypoglycemics while patient is in the hospital.

## 2023-07-20 NOTE — PLAN OF CARE
Problem: Skin Injury Risk Increased  Goal: Skin Health and Integrity  Intervention: Optimize Skin Protection  Flowsheets (Taken 7/20/2023 0417)  Pressure Reduction Techniques:   positioned off wounds   pressure points protected   frequent weight shift encouraged  Skin Protection: incontinence pads utilized  Intervention: Promote and Optimize Oral Intake  Flowsheets (Taken 7/20/2023 0417)  Oral Nutrition Promotion:   physical activity promoted   rest periods promoted     Problem: Infection  Goal: Absence of Infection Signs and Symptoms  Intervention: Prevent or Manage Infection  Flowsheets (Taken 7/20/2023 0417)  Infection Management: (IV Zosyn) --

## 2023-07-20 NOTE — ASSESSMENT & PLAN NOTE
-Presents with  and creatinine of 5.4 with baseline of 2.5-2.8.    -BNP 99 when usually patient has BNP greater than 300.    -Likely volume depletion in the setting of sepsis and poor p.o. intake.    -He was given 2 L normal saline in the emergency department.  Currently with good urine output.  -[place] catheter in place.  -Renal US normal  -Continue to monitor, if no improvement plans for Nephrology consult  -Recheck lab work in the morning  -Cr improving

## 2023-07-20 NOTE — PROGRESS NOTES
Pharmacokinetic Assessment Follow Up: IV Vancomycin    Vancomycin serum concentration assessment(s):    The random level was drawn correctly and can be used to guide therapy at this time. The measurement is within the desired definitive target range of 10 to 20 mcg/mL.    Vancomycin Regimen Plan:    Dose 750 mg x 1 today 7/20    Re-dose when the random level is less than 20 mcg/mL, next level to be drawn at 0600 on 7/21    Drug levels (last 3 results):  Recent Labs   Lab Result Units 07/18/23  0606 07/19/23  0548 07/20/23  0559   Vancomycin, Random ug/mL 15.9 13.8 14.3       Pharmacy will continue to follow and monitor vancomycin.    Please contact pharmacy at extension [phone] for questions regarding this assessment.    Thank you for the consult,   Willem Navarro       Patient brief summary:  Chato Bowie is a 72 y.o. male initiated on antimicrobial therapy with IV Vancomycin for treatment of sepsis      Drug Allergies:   Review of patient's allergies indicates:  No Known Allergies    Actual Body Weight:   99 kg    Renal Function:   Estimated Creatinine Clearance: 32.1 mL/min (A) (based on SCr of 2.5 mg/dL (H)).,     Dialysis Method (if applicable):  N/A    CBC (last 72 hours):  Recent Labs   Lab Result Units 07/18/23  0605 07/19/23  0548 07/20/23  0559   WBC K/uL 13.52* 9.06 8.64   Hemoglobin g/dL 10.9* 9.8* 8.0*   Hematocrit % 32.7* 29.3* 24.6*   Platelets K/uL 152 162 169   Gran % % 84.4* 81.1* 78.7*   Lymph % % 4.4* 7.5* 8.1*   Mono % % 9.5 9.2 11.6   Eosinophil % % 0.7 1.1 0.7   Basophil % % 0.1 0.2 0.2   Differential Method  Automated Automated Automated       Metabolic Panel (last 72 hours):  Recent Labs   Lab Result Units 07/18/23  0605 07/19/23  0548 07/20/23  0559   Sodium mmol/L 137 137 134*   Potassium mmol/L 3.3* 3.3* 3.2*   Chloride mmol/L 100 101 101   CO2 mmol/L 25 26 24   Glucose mg/dL 164* 148* 157*   BUN mg/dL 94* 80* 59*   Creatinine mg/dL 3.9* 3.0* 2.5*   Albumin g/dL 2.6* 2.2* 2.0*   Total  Bilirubin mg/dL 0.9 0.8 0.8   Alkaline Phosphatase U/L 60 53* 49*   AST U/L 19 14 11   ALT U/L 18 16 14   Magnesium mg/dL 2.1 1.9 1.7   Phosphorus mg/dL 4.1 2.7 2.1*       Vancomycin Administrations:  vancomycin given in the last 96 hours                     vancomycin 750 mg in dextrose 5 % (D5W) 250 mL IVPB (Vial-Mate) (mg) 750 mg New Bag 07/19/23 0815    vancomycin (VANCOCIN) 500 mg in dextrose 5 % in water (D5W) 5 % 100 mL IVPB (MB+) (mg) 500 mg New Bag 07/18/23 0938    vancomycin (VANCOCIN) 2,000 mg in dextrose 5 % (D5W) 500 mL IVPB (mg) 2,000 mg New Bag 07/17/23 1307                    Microbiologic Results:  Microbiology Results (last 7 days)       Procedure Component Value Units Date/Time    Blood culture x two cultures. Draw prior to antibiotics. [[id number]] Collected: 07/17/23 1241    Order Status: Completed Specimen: Blood from Peripheral, Hand, Right Updated: 07/20/23 1303     Blood Culture, Routine No Growth to date      No Growth to date      No Growth to date      No Growth to date    Narrative:      Aerobic and anaerobic    Blood culture x two cultures. Draw prior to antibiotics. [[id number]] Collected: 07/17/23 1239    Order Status: Completed Specimen: Blood from Peripheral, Antecubital, Left Updated: 07/20/23 1303     Blood Culture, Routine No Growth to date      No Growth to date      No Growth to date      No Growth to date    Narrative:      Aerobic and anaerobic

## 2023-07-20 NOTE — PLAN OF CARE
Plan for discharge SNF to detention placement. Per [facility], medically patient accepted, will reach out to patient's nieceVal for financials. Pt will requires isolation for shingles.     Per care port, pt medically accepted to [facility]     Placed call to patient's Val nogueira, left voice message. [place] to continue to follow up.     2:50pm Received call from swapna Amezquita, [phone] stated plan to meet with patient for assessment on tomorrow for 1pm   07/20/23 0858   Post-Acute Status   Post-Acute Authorization Placement   Post-Acute Placement Status Pending post-acute provider review/more information requested   Coverage RIK   Discharge Delays (!) Post-Acute Set-up   Discharge Plan   Discharge Plan A Skilled Nursing Facility   Discharge Plan B New Nursing Home placement - detention care facility

## 2023-07-20 NOTE — NURSING
Report received and pt found resting in bed w/IVF infusing, pt w/BM and was changed, wounds noted to LLE, hip, buttock, BU/LE edema noted, pt able to voice needs, turned to L side, POC explained and all questions addressed, lea

## 2023-07-20 NOTE — PROGRESS NOTES
[facility] Medicine  Progress Note    Patient Name: Chato Bowie  MRN: [id number]  Patient Class: IP- Inpatient   Admission Date: 7/17/2023  Length of Stay: 3 days  Attending Physician: Tiffanie Rojas DO  Primary Care Provider: Irlanda Valenzuela        Subjective:     Principal Problem:Sepsis        HPI:  Mr. Bowie is a 72-year-old with extensive past medical history including CHF, arthritis, coronary artery disease, diabetes mellitus type 2, hypertension and hyperlipidemia who presents to the emergency department for evaluation of pain and swelling.  Patient was recently diagnosed with shingles and sent home with foul acyclovir.  Patient was found at home unable to get up from his chair and sitting in urine and feces.  Patient is not able to give much history in the emergency department so most of this history was obtained from medical review and providers in the emergency department.    In the emergency department, patient was found to be febrile of 100.8 with initial blood pressure 125/68 saturating 98% on room air.  Lab work revealed a leukocytosis of 17.4, acute on chronic kidney disease with creatinine 5.4  with elevated procalcitonin.  Patient underwent ultrasound of lower extremities which did not reveal DVT.  CT abdomen and pelvis did not show any acute intra-abdominal abnormalities but does note soft tissue edema in the bilateral hip regions.  Patient also found to have concerns for infection of right flank and hip wounds.  Patient admitted to hospital medicine for further management.        Overview/Hospital Course:   72-year-old with extensive past medical history including CHF, arthritis, coronary artery disease, diabetes mellitus type 2, hypertension and hyperlipidemia admitted on 7/17/2023 for sepsis likely secondary to shingles and overlying cellulitis and acute kidney injury. Stated on IV antibiotics and continued on valacyclovir for shingles treatment. Wound care  consulted. Also found to have elevated troponin and hypokalemia.  Electrolytes replaced as needed.  Suspect troponin elevation due to demand in the setting of sepsis and ELIZABETH.  ECG reviewed and read as unusual P axis. Has known LBBB. Recent echo with normal EF and grade II diastolic dysfunction.  Troponin trended down.  Patient without any chest pain or shortness of breath. ELIZABETH improving and leukocytosis resolved. PT/OT consulted-recommends SNF. Patient unable to care for himself, will likely need to transition to intermediate NH. Patient medically stable for discharge to SNF. Awaiting placement.       Interval History:  No acute overnight events.  Patient remained afebrile overnight.  Has pain to right buttock but controlled with pain medications.  Complains of generalized weakness. Alert oriented x4.    Review of Systems   Constitutional:  Negative for chills, fatigue and fever.   Respiratory:  Negative for cough and shortness of breath.    Cardiovascular:  Positive for leg swelling (chronic, history of anarsarca). Negative for chest pain and palpitations.   Gastrointestinal:  Negative for abdominal pain, diarrhea, nausea and vomiting.   Genitourinary:  Negative for difficulty urinating, dysuria, frequency and hematuria.   Musculoskeletal:  Positive for gait problem and myalgias. Negative for joint swelling.   Skin:  Positive for rash.   Neurological:  Positive for weakness. Negative for dizziness and headaches.     Objective:     Vital Signs (Most Recent):  Temp: 98.7 °F (37.1 °C) (07/20/23 1152)  Pulse: 75 (07/20/23 1153)  Resp: (!) 22 (07/20/23 1152)  BP: (!) 195/82 (07/20/23 1152)  SpO2: 100 % (07/20/23 1153) Vital Signs (24h Range):  Temp:  [98.3 °F (36.8 °C)-99.5 °F (37.5 °C)] 98.7 °F (37.1 °C)  Pulse:  [71-75] 75  Resp:  [18-22] 22  SpO2:  [95 %-100 %] 100 %  BP: (147-195)/(65-82) 195/82     Weight: 99.2 kg (218 lb 11.2 oz)  Body mass index is 30.5 kg/m².    Intake/Output Summary (Last 24 hours) at 7/20/2023  1323  Last data filed at 7/20/2023 1048  Gross per 24 hour   Intake 2321.36 ml   Output 3226 ml   Net -904.64 ml           Physical Exam  Vitals and nursing note reviewed.   Constitutional:       General: He is not in acute distress.     Appearance: He is obese. He is ill-appearing (chronically).   HENT:      Mouth/Throat:      Mouth: Mucous membranes are moist.   Eyes:      Extraocular Movements: Extraocular movements intact.   Cardiovascular:      Rate and Rhythm: Normal rate and regular rhythm.      Heart sounds: No murmur heard.    No gallop.   Pulmonary:      Effort: Pulmonary effort is normal. No respiratory distress.      Breath sounds: No wheezing.   Abdominal:      General: Bowel sounds are normal. There is no distension.      Tenderness: There is no abdominal tenderness. There is no guarding.   Musculoskeletal:         General: Swelling present.   Skin:     General: Skin is warm.      Findings: Rash present.      Comments: Noted lesion to right heel, right lateral calf as well as skin lesions to the right flank.  Refer to media images   Neurological:      Mental Status: He is alert and oriented to person, place, and time.      Motor: Weakness (generaized weakness) present.   Psychiatric:         Mood and Affect: Mood normal.         Behavior: Behavior normal.           Significant Labs: All pertinent labs within the past 24 hours have been reviewed.    Significant Imaging: I have reviewed all pertinent imaging results/findings within the past 24 hours.      Assessment/Plan:      * Sepsis  This patient does have evidence of infective focus  My overall impression is sepsis.  Source: left knee vs superimposed cellulitis (shingles).  Suspect source is cellulitis at this time.  Antibiotics given-   Antibiotics (72h ago, onward)    Start     Stop Route Frequency Ordered    07/19/23 0900  mupirocin 2 % ointment         07/24 0859 Nasl 2 times daily 07/19/23 0656    07/17/23 1251  vancomycin - pharmacy to dose   (vancomycin IVPB (PEDS and ADULTS))        See Hyperspace for full Linked Orders Report.    -- IV pharmacy to manage frequency 07/17/23 1152    07/17/23 1230  piperacillin-tazobactam (ZOSYN) 4.5 g in dextrose 5 % in water (D5W) 5 % 100 mL IVPB (MB+)         -- IV Every 8 hours (non-standard times) 07/17/23 1123        Latest lactate reviewed-  Recent Labs   Lab 07/17/23  1753   LACTATE 1.3     Organ dysfunction indicated by Acute kidney injury and Encephalopathy    Fluid challenge given 2 liters NS in ED - not hypotensive    Post- resuscitation assessment Yes Perfusion exam was performed within 6 hours of septic shock presentation after bolus shows Adequate tissue perfusion assessed by non-invasive monitoring       Will Not start Pressors  Source control achieved by: IV antibiotics    -Presented febrile, tachypneic and with leukocytosis  -Also have ELIZABETH on admission  -recently diagnosed with shingles, but patient has not started his prescription of the valacyclovir outpatient  -blood culture with no growth to date.    -continue valacyclovir for shingles and Zosyn for superimposed cellulitis  -Leukocytosis resolved 07/19    Acute kidney injury superimposed on chronic kidney disease  -Presents with  and creatinine of 5.4 with baseline of 2.5-2.8.    -BNP 99 when usually patient has BNP greater than 300.    -Likely volume depletion in the setting of sepsis and poor p.o. intake.    -He was given 2 L normal saline in the emergency department.  Currently with good urine output.  -De Leon catheter in place.  -Renal US normal  -Continue to monitor, if no improvement plans for Nephrology consult  -Recheck lab work in the morning  -Cr improving     Skin rash  -Diagnosed with shingles and prescribed valacyclovir outpatient   -He states that this was picked up but did not start treatment    -Continue renal dose valacyclovir as well as IV antibiotics for superimposed bacterial infection and source of sepsis.  Precautions  noted.  -Wound care consult      Hypokalemia  -Replace as needed      Essential hypertension  -blood pressure elevated, patient is stable   -resume home hydralazine, metoprolol succinate, and amiodarone  -Increased home hydralazine to 100mg TID on 07/20    Acute encephalopathy  Patient appears encephalopathic and different from his usual baseline on admission.  His BUN is 114 so uremic encephalopathy is on differential as well as sepsis.  Suspect these are etiology but will CT head for completeness.  - Ct head: No acute intracranial abnormalities identified.  No significant detrimental change compared to recent CT head 06/01/2023.   - AOx4, mental status normal and at baseline on 07/18  -Resolved      Elevated troponin  -Presents with sepsis and found to have elevated troponin of 0.183.   -Denies chest pain. ECG reviewed and read as unusual P axis. Has known LBBB.   -Recent echo with normal EF and grade II diastolic dysfunction.   -Suspect this is demand in setting of sepsis.   -trend troponin, downtrend      Type 2 diabetes mellitus with hyperglycemia  Patient's FSGs are controlled on current medication regimen.  Last A1c reviewed-   Lab Results   Component Value Date    HGBA1C 7.8 (H) 06/02/2023     Most recent fingerstick glucose reviewed-   Recent Labs   Lab 07/19/23  1631 07/19/23  2055 07/20/23  0725 07/20/23  1137   POCTGLUCOSE 272* 306* 177* 156*     Current correctional scale  Low  Maintain anti-hyperglycemic dose as follows-   Antihyperglycemics (From admission, onward)    Start     Stop Route Frequency Ordered    07/17/23 1738  insulin aspart U-100 pen 0-5 Units         -- SubQ Before meals & nightly PRN 07/17/23 1641        Hold Oral hypoglycemics while patient is in the hospital.    Anasarca  This is chronic though patient appears volume depleted in the setting of sepsis.    Continue to monitor.      Left knee pain  -This is chronic, left knee appears swollen but did not appear erythematous or warm.     -XR left knee: No evidence of acute displaced fracture, dislocation, or osseous destructive process.  Severe joint space narrowing is seen at the medial tibiofemoral compartment.  There is significant suprapatellar joint effusion      DJD (degenerative joint disease)  -Chronic issues, particularly left knee.  -XR of left knee: No evidence of acute displaced fracture, dislocation, or osseous destructive process.  Severe joint space narrowing is seen at the medial tibiofemoral compartment.  There is significant suprapatellar joint effusion      BPH (benign prostatic hyperplasia)  De Leon in place, resume Flomax  Voiding trial on 07/20      Falls  -PT/OT consulted: recommends SNF  -Medically clear for discharge to SNF. Awaiting placement     Class 1 obesity due to excess calories with serious comorbidity and body mass index (BMI) of 30.0 to 30.9 in adult  Body mass index is 30.5 kg/m². Morbid obesity complicates all aspects of disease management from diagnostic modalities to treatment. Weight loss encouraged and health benefits explained to patient.           VTE Risk Mitigation (From admission, onward)         Ordered     apixaban tablet 5 mg  2 times daily         07/17/23 1641     IP VTE HIGH RISK PATIENT  Once         07/17/23 1641     Place sequential compression device  Until discontinued         07/17/23 1641                Discharge Planning   ELY: 7/21/2023     Code Status: Full Code   Is the patient medically ready for discharge?:     Reason for patient still in hospital (select all that apply): Patient trending condition, Treatment, PT / OT recommendations and Pending disposition  Discharge Plan A: Skilled Nursing Facility   Discharge Delays: (!) Post-Acute Set-up              Tiffanie Rojas DO  Department of Hospital Medicine   [facility] - Med Surg

## 2023-07-20 NOTE — ASSESSMENT & PLAN NOTE
This patient does have evidence of infective focus  My overall impression is sepsis.  Source: left knee vs superimposed cellulitis (shingles).  Suspect source is cellulitis at this time.  Antibiotics given-   Antibiotics (72h ago, onward)    Start     Stop Route Frequency Ordered    07/19/23 0900  mupirocin 2 % ointment         07/24 0859 Nasl 2 times daily 07/19/23 0656    07/17/23 1251  vancomycin - pharmacy to dose  (vancomycin IVPB (PEDS and ADULTS))        See [place] for full Linked Orders Report.    -- IV pharmacy to manage frequency 07/17/23 1152    07/17/23 1230  piperacillin-tazobactam (ZOSYN) 4.5 g in dextrose 5 % in water (D5W) 5 % 100 mL IVPB (MB+)         -- IV Every 8 hours (non-standard times) 07/17/23 1123        Latest lactate reviewed-  Recent Labs   Lab 07/17/23  1753   LACTATE 1.3     Organ dysfunction indicated by Acute kidney injury and Encephalopathy    Fluid challenge given 2 liters NS in ED - not hypotensive    Post- resuscitation assessment Yes Perfusion exam was performed within 6 hours of septic shock presentation after bolus shows Adequate tissue perfusion assessed by non-invasive monitoring       Will Not start Pressors  Source control achieved by: IV antibiotics    -Presented febrile, tachypneic and with leukocytosis  -Also have ELIZABETH on admission  -recently diagnosed with shingles, but patient has not started his prescription of the valacyclovir outpatient  -blood culture with no growth to date.    -continue valacyclovir for shingles and Zosyn for superimposed cellulitis  -Leukocytosis resolved 07/19

## 2023-07-20 NOTE — ASSESSMENT & PLAN NOTE
-blood pressure elevated, patient is stable   -resume home hydralazine, metoprolol succinate, and amiodarone  -Increased home hydralazine to 100mg TID on 07/20

## 2023-07-20 NOTE — PT/OT/SLP PROGRESS
Occupational Therapy      Patient Name:  Chato Bowie   MRN:  [id number]    Patient not seen today secondary to Pain, Patient fatigue (OT attempted x2 but patient refused 2* c/o pain. The patient refused after receiving pain meds 2* c/o feeling too tired.). Will follow-up tomorrow.    7/20/2023

## 2023-07-20 NOTE — SUBJECTIVE & OBJECTIVE
Interval History:  No acute overnight events.  Patient remained afebrile overnight.  Has pain to right buttock but controlled with pain medications.  Complains of generalized weakness. Alert oriented x4.    Review of Systems   Constitutional:  Negative for chills, fatigue and fever.   Respiratory:  Negative for cough and shortness of breath.    Cardiovascular:  Positive for leg swelling (chronic, history of anarsarca). Negative for chest pain and palpitations.   Gastrointestinal:  Negative for abdominal pain, diarrhea, nausea and vomiting.   Genitourinary:  Negative for difficulty urinating, dysuria, frequency and hematuria.   Musculoskeletal:  Positive for gait problem and myalgias. Negative for joint swelling.   Skin:  Positive for rash.   Neurological:  Positive for weakness. Negative for dizziness and headaches.     Objective:     Vital Signs (Most Recent):  Temp: 98.7 °F (37.1 °C) (07/20/23 1152)  Pulse: 75 (07/20/23 1153)  Resp: (!) 22 (07/20/23 1152)  BP: (!) 195/82 (07/20/23 1152)  SpO2: 100 % (07/20/23 1153) Vital Signs (24h Range):  Temp:  [98.3 °F (36.8 °C)-99.5 °F (37.5 °C)] 98.7 °F (37.1 °C)  Pulse:  [71-75] 75  Resp:  [18-22] 22  SpO2:  [95 %-100 %] 100 %  BP: (147-195)/(65-82) 195/82     Weight: 99.2 kg (218 lb 11.2 oz)  Body mass index is 30.5 kg/m².    Intake/Output Summary (Last 24 hours) at 7/20/2023 1323  Last data filed at 7/20/2023 1048  Gross per 24 hour   Intake 2321.36 ml   Output 3226 ml   Net -904.64 ml           Physical Exam  Vitals and nursing note reviewed.   Constitutional:       General: He is not in acute distress.     Appearance: He is obese. He is ill-appearing (chronically).   HENT:      Mouth/Throat:      Mouth: Mucous membranes are moist.   Eyes:      Extraocular Movements: Extraocular movements intact.   Cardiovascular:      Rate and Rhythm: Normal rate and regular rhythm.      Heart sounds: No murmur heard.    No gallop.   Pulmonary:      Effort: Pulmonary effort is normal.  No respiratory distress.      Breath sounds: No wheezing.   Abdominal:      General: Bowel sounds are normal. There is no distension.      Tenderness: There is no abdominal tenderness. There is no guarding.   Musculoskeletal:         General: Swelling present.   Skin:     General: Skin is warm.      Findings: Rash present.      Comments: Noted lesion to right heel, right lateral calf as well as skin lesions to the right flank.  Refer to media images   Neurological:      Mental Status: He is alert and oriented to person, place, and time.      Motor: Weakness (generaized weakness) present.   Psychiatric:         Mood and Affect: Mood normal.         Behavior: Behavior normal.           Significant Labs: All pertinent labs within the past 24 hours have been reviewed.    Significant Imaging: I have reviewed all pertinent imaging results/findings within the past 24 hours.

## 2023-07-20 NOTE — PT/OT/SLP PROGRESS
Physical Therapy      Patient Name:  Chato Bowie   MRN:  [id number]    Patient not seen today secondary to Patient fatigue, Pain, Patient unwilling to participate despite encouragement, Other (2 attempts: 1st attempt pt had 10/10 pain to the Right hip where the old blister at, nurse notified and gave him pain med; 2nd attempt pt was fatigue, sleepy, and still had pain) . Will follow-up tomorrow.

## 2023-07-21 LAB
ANION GAP SERPL CALC-SCNC: 9 MMOL/L (ref 8–16)
BACTERIA BLD CULT: NORMAL
BACTERIA BLD CULT: NORMAL
BASOPHILS # BLD AUTO: 0.03 K/UL (ref 0–0.2)
BASOPHILS NFR BLD: 0.3 % (ref 0–1.9)
BUN SERPL-MCNC: 46 MG/DL (ref 8–23)
CALCIUM SERPL-MCNC: 8.1 MG/DL (ref 8.7–10.5)
CHLORIDE SERPL-SCNC: 102 MMOL/L (ref 95–110)
CO2 SERPL-SCNC: 23 MMOL/L (ref 23–29)
CREAT SERPL-MCNC: 2.1 MG/DL (ref 0.5–1.4)
DIFFERENTIAL METHOD: ABNORMAL
EOSINOPHIL # BLD AUTO: 0.1 K/UL (ref 0–0.5)
EOSINOPHIL NFR BLD: 1.4 % (ref 0–8)
ERYTHROCYTE [DISTWIDTH] IN BLOOD BY AUTOMATED COUNT: 14.1 % (ref 11.5–14.5)
EST. GFR  (NO RACE VARIABLE): 33 ML/MIN/1.73 M^2
GLUCOSE SERPL-MCNC: 144 MG/DL (ref 70–110)
HCT VFR BLD AUTO: 27.6 % (ref 40–54)
HGB BLD-MCNC: 9.2 G/DL (ref 14–18)
IMM GRANULOCYTES # BLD AUTO: 0.05 K/UL (ref 0–0.04)
IMM GRANULOCYTES NFR BLD AUTO: 0.5 % (ref 0–0.5)
LYMPHOCYTES # BLD AUTO: 0.7 K/UL (ref 1–4.8)
LYMPHOCYTES NFR BLD: 7.7 % (ref 18–48)
MAGNESIUM SERPL-MCNC: 1.7 MG/DL (ref 1.6–2.6)
MCH RBC QN AUTO: 27.1 PG (ref 27–31)
MCHC RBC AUTO-ENTMCNC: 33.3 G/DL (ref 32–36)
MCV RBC AUTO: 81 FL (ref 82–98)
MONOCYTES # BLD AUTO: 0.8 K/UL (ref 0.3–1)
MONOCYTES NFR BLD: 8.7 % (ref 4–15)
NEUTROPHILS # BLD AUTO: 7.4 K/UL (ref 1.8–7.7)
NEUTROPHILS NFR BLD: 81.4 % (ref 38–73)
NRBC BLD-RTO: 0 /100 WBC
PHOSPHATE SERPL-MCNC: 2.4 MG/DL (ref 2.7–4.5)
PLATELET # BLD AUTO: 169 K/UL (ref 150–450)
PMV BLD AUTO: 8.5 FL (ref 9.2–12.9)
POCT GLUCOSE: 121 MG/DL (ref 70–110)
POCT GLUCOSE: 139 MG/DL (ref 70–110)
POCT GLUCOSE: 148 MG/DL (ref 70–110)
POCT GLUCOSE: 152 MG/DL (ref 70–110)
POTASSIUM SERPL-SCNC: 4 MMOL/L (ref 3.5–5.1)
RBC # BLD AUTO: 3.39 M/UL (ref 4.6–6.2)
SODIUM SERPL-SCNC: 134 MMOL/L (ref 136–145)
VANCOMYCIN SERPL-MCNC: 15.3 UG/ML
WBC # BLD AUTO: 9.11 K/UL (ref 3.9–12.7)

## 2023-07-21 PROCEDURE — 97116 GAIT TRAINING THERAPY: CPT | Mod: CQ

## 2023-07-21 PROCEDURE — 80048 BASIC METABOLIC PNL TOTAL CA: CPT | Performed by: STUDENT IN AN ORGANIZED HEALTH CARE EDUCATION/TRAINING PROGRAM

## 2023-07-21 PROCEDURE — 36415 COLL VENOUS BLD VENIPUNCTURE: CPT | Performed by: EMERGENCY MEDICINE

## 2023-07-21 PROCEDURE — 63600175 PHARM REV CODE 636 W HCPCS: Performed by: STUDENT IN AN ORGANIZED HEALTH CARE EDUCATION/TRAINING PROGRAM

## 2023-07-21 PROCEDURE — 25000003 PHARM REV CODE 250: Performed by: EMERGENCY MEDICINE

## 2023-07-21 PROCEDURE — 84100 ASSAY OF PHOSPHORUS: CPT | Performed by: STUDENT IN AN ORGANIZED HEALTH CARE EDUCATION/TRAINING PROGRAM

## 2023-07-21 PROCEDURE — 97530 THERAPEUTIC ACTIVITIES: CPT | Mod: CQ

## 2023-07-21 PROCEDURE — 36415 COLL VENOUS BLD VENIPUNCTURE: CPT | Performed by: STUDENT IN AN ORGANIZED HEALTH CARE EDUCATION/TRAINING PROGRAM

## 2023-07-21 PROCEDURE — 83735 ASSAY OF MAGNESIUM: CPT | Performed by: STUDENT IN AN ORGANIZED HEALTH CARE EDUCATION/TRAINING PROGRAM

## 2023-07-21 PROCEDURE — 25000003 PHARM REV CODE 250: Performed by: STUDENT IN AN ORGANIZED HEALTH CARE EDUCATION/TRAINING PROGRAM

## 2023-07-21 PROCEDURE — 97110 THERAPEUTIC EXERCISES: CPT

## 2023-07-21 PROCEDURE — 97530 THERAPEUTIC ACTIVITIES: CPT

## 2023-07-21 PROCEDURE — 80202 ASSAY OF VANCOMYCIN: CPT | Performed by: EMERGENCY MEDICINE

## 2023-07-21 PROCEDURE — 27000207 HC ISOLATION

## 2023-07-21 PROCEDURE — 97110 THERAPEUTIC EXERCISES: CPT | Mod: CQ

## 2023-07-21 PROCEDURE — 63600175 PHARM REV CODE 636 W HCPCS: Performed by: EMERGENCY MEDICINE

## 2023-07-21 PROCEDURE — 11000001 HC ACUTE MED/SURG PRIVATE ROOM

## 2023-07-21 PROCEDURE — 85025 COMPLETE CBC W/AUTO DIFF WBC: CPT | Performed by: STUDENT IN AN ORGANIZED HEALTH CARE EDUCATION/TRAINING PROGRAM

## 2023-07-21 PROCEDURE — 97535 SELF CARE MNGMENT TRAINING: CPT

## 2023-07-21 RX ORDER — LANOLIN ALCOHOL/MO/W.PET/CERES
400 CREAM (GRAM) TOPICAL ONCE
Status: COMPLETED | OUTPATIENT
Start: 2023-07-21 | End: 2023-07-21

## 2023-07-21 RX ORDER — SODIUM,POTASSIUM PHOSPHATES 280-250MG
2 POWDER IN PACKET (EA) ORAL
Status: COMPLETED | OUTPATIENT
Start: 2023-07-21 | End: 2023-07-21

## 2023-07-21 RX ADMIN — PIPERACILLIN AND TAZOBACTAM 4.5 G: 4; .5 INJECTION, POWDER, LYOPHILIZED, FOR SOLUTION INTRAVENOUS; PARENTERAL at 08:07

## 2023-07-21 RX ADMIN — VALACYCLOVIR HYDROCHLORIDE 1000 MG: 500 TABLET, FILM COATED ORAL at 09:07

## 2023-07-21 RX ADMIN — AMIODARONE HYDROCHLORIDE 200 MG: 200 TABLET ORAL at 09:07

## 2023-07-21 RX ADMIN — Medication 2 PACKET: at 01:07

## 2023-07-21 RX ADMIN — HYDRALAZINE HYDROCHLORIDE 100 MG: 25 TABLET, FILM COATED ORAL at 05:07

## 2023-07-21 RX ADMIN — PIPERACILLIN AND TAZOBACTAM 4.5 G: 4; .5 INJECTION, POWDER, LYOPHILIZED, FOR SOLUTION INTRAVENOUS; PARENTERAL at 11:07

## 2023-07-21 RX ADMIN — PIPERACILLIN AND TAZOBACTAM 4.5 G: 4; .5 INJECTION, POWDER, LYOPHILIZED, FOR SOLUTION INTRAVENOUS; PARENTERAL at 04:07

## 2023-07-21 RX ADMIN — METOPROLOL SUCCINATE 25 MG: 25 TABLET, EXTENDED RELEASE ORAL at 09:07

## 2023-07-21 RX ADMIN — HYDRALAZINE HYDROCHLORIDE 100 MG: 25 TABLET, FILM COATED ORAL at 10:07

## 2023-07-21 RX ADMIN — HYDRALAZINE HYDROCHLORIDE 100 MG: 25 TABLET, FILM COATED ORAL at 01:07

## 2023-07-21 RX ADMIN — HYDROCODONE BITARTRATE AND ACETAMINOPHEN 1 TABLET: 10; 325 TABLET ORAL at 04:07

## 2023-07-21 RX ADMIN — POTASSIUM CHLORIDE 10 MEQ: 750 TABLET, EXTENDED RELEASE ORAL at 09:07

## 2023-07-21 RX ADMIN — Medication 2 PACKET: at 08:07

## 2023-07-21 RX ADMIN — HYDROCODONE BITARTRATE AND ACETAMINOPHEN 1 TABLET: 10; 325 TABLET ORAL at 05:07

## 2023-07-21 RX ADMIN — APIXABAN 5 MG: 5 TABLET, FILM COATED ORAL at 08:07

## 2023-07-21 RX ADMIN — TAMSULOSIN HYDROCHLORIDE 0.4 MG: 0.4 CAPSULE ORAL at 09:07

## 2023-07-21 RX ADMIN — HYDROCODONE BITARTRATE AND ACETAMINOPHEN 1 TABLET: 10; 325 TABLET ORAL at 11:07

## 2023-07-21 RX ADMIN — APIXABAN 5 MG: 5 TABLET, FILM COATED ORAL at 09:07

## 2023-07-21 RX ADMIN — Medication 400 MG: at 01:07

## 2023-07-21 RX ADMIN — Medication 2 PACKET: at 03:07

## 2023-07-21 RX ADMIN — VANCOMYCIN HYDROCHLORIDE 750 MG: 750 INJECTION, POWDER, LYOPHILIZED, FOR SOLUTION INTRAVENOUS at 06:07

## 2023-07-21 RX ADMIN — FERROUS SULFATE TAB 325 MG (65 MG ELEMENTAL FE) 1 EACH: 325 (65 FE) TAB at 09:07

## 2023-07-21 NOTE — PT/OT/SLP PROGRESS
Occupational Therapy   Treatment    Name: Chato Bowie  MRN: [id number]  Admitting Diagnosis:  Sepsis       Recommendations:     Discharge Recommendations: nursing facility, skilled  Discharge Equipment Recommendations:  none  Barriers to discharge:   (generalized weakness, not at PLOF, fall risk)    Assessment:     Chato Bowie is a 72 y.o. male with a medical diagnosis of Sepsis.  Performance deficits affecting function are weakness, impaired endurance, impaired self care skills, impaired functional mobility, gait instability, impaired balance, decreased upper extremity function, decreased coordination, decreased lower extremity function, decreased safety awareness, pain, decreased ROM, impaired skin, edema, impaired cardiopulmonary response to activity.   The patient participated in OT with encouragement. The patient was limited by c/o right hip pain and c/o weakness. increased edema noted in BLE (left>left) and bloody drainage from right hip and buttock wounds, nurse notified. OT will continue as planned.    Rehab Prognosis:  Fair; patient would benefit from acute skilled OT services to address these deficits and reach maximum level of function.       Plan:     Patient to be seen 5 x/week to address the above listed problems via self-care/home management, therapeutic activities, therapeutic exercises  Plan of Care Expires: 08/01/23  Plan of Care Reviewed with: patient    Subjective     Chief Complaint: right hip pain  Patient/Family Comments/goals: agreeable to OT WITH ENCOURAGEMENT  Pain/Comfort:  Pain Rating 1:  (c/o severe pain but did not rate)  Location - Side 1: Right  Location 1: hip  Pain Addressed 1: Pre-medicate for activity, Reposition, Distraction, Cessation of Activity, Nurse notified    Objective:     Communicated with: NURSE prior to session.  Patient found HOB elevated with telemetry, peripheral IV, pressure relief boots, jackson catheter upon OT entry to room.    General Precautions: Standard,  airborne, fall, diabetic, contact (shingles)    Orthopedic Precautions:N/A  Braces: N/A  Respiratory Status: Room air     Occupational Performance:     Bed Mobility:    Patient completed Scooting/Bridging with contact guard assistance  Patient completed Supine to Sit with minimum assistance and hob ELEVATED AND 2 PERSON ASSIST  Patient completed Sit to Supine with moderate assistance, 2 persons, and with leg lift     Functional Mobility/Transfers:  Patient completed Sit <> Stand Transfer with moderate assistance and of 2 persons  with  rolling walker   Functional Mobility: THE PATIENT STOOD AND SIDE STEPPED WITH MOD assist x2 person and max encouragement for upright posture.     Activities of Daily Living:  Grooming: stand by assistance to wipe hands and face while seated on the EOB  Upper Body Dressing: maximal assistance to don back gown  Lower Body Dressing: total assistance    Toileting: jackson cath present, incontinent of stool , dependent for cleaning      [facility] 6 Click ADL: 14    Treatment & Education:  Performed self care and functional mobility as noted above  Sat on the EOB ~25 min to perform UE therex with OT and LB ex with PT  Performed B shoulder flex/ext x10 reps, B forward punch to BUE x10 reps  Educated the patient re: need to position on the left side to off load pressure from the right hip and buttocks with use of wedge, pillows and pressure relief boots  Educated the patient re: OT goals and educated the patient re: importance of movement for edema management    Patient left left sidelying with all lines intact, call button in reach, use of positioning wedge, nurse notified    GOALS:   Multidisciplinary Problems       Occupational Therapy Goals          Problem: Occupational Therapy    Goal Priority Disciplines Outcome Interventions   Occupational Therapy Goal     OT, PT/OT Ongoing, Progressing    Description: Goals to be met by: 8/1/23     Patient will increase functional independence with ADLs by  performing:    Feeding with [place].  UE Dressing with Modified [place].  LE Dressing with Modified [place] and Supervision.  Grooming while standing at sink with Contact Guard Assistance and Assistive Devices as needed.  Toileting from bedside commode with Set-up Assistance and Supervision for hygiene and clothing management.   Rolling to Bilateral with Modified [place].   Supine to sit with Modified [place] and Supervision.  Step transfer with Contact Guard Assistance  Toilet transfer to bedside commode with Contact Guard Assistance.  Upper extremity exercise program x15 reps per handout, with assistance as needed.                         Time Tracking:     OT Date of Treatment: 07/21/23  OT Start Time: 1208  OT Stop Time: 1248  OT Total Time (min): 40 min    Billable Minutes:Self Care/Home Management 15  Therapeutic Activity 15  Therapeutic Exercise 10  Total Time 40 (with PT)    OT/JASON: OT          7/21/2023

## 2023-07-21 NOTE — ASSESSMENT & PLAN NOTE
This patient does have evidence of infective focus  My overall impression is sepsis.  Source: left knee vs superimposed cellulitis (shingles).  Suspect source is cellulitis at this time.  Antibiotics given-   Antibiotics (72h ago, onward)    Start     Stop Route Frequency Ordered    07/19/23 0900  mupirocin 2 % ointment         07/24 0859 Nasl 2 times daily 07/19/23 0656    07/17/23 1230  piperacillin-tazobactam (ZOSYN) 4.5 g in dextrose 5 % in water (D5W) 5 % 100 mL IVPB (MB+)         -- IV Every 8 hours (non-standard times) 07/17/23 1123        Latest lactate reviewed-  No results for input(s): LACTATE in the last 72 hours.  Organ dysfunction indicated by Acute kidney injury and Encephalopathy    Fluid challenge given 2 liters NS in ED - not hypotensive    Post- resuscitation assessment Yes Perfusion exam was performed within 6 hours of septic shock presentation after bolus shows Adequate tissue perfusion assessed by non-invasive monitoring       Will Not start Pressors  Source control achieved by: IV antibiotics    -Presented febrile, tachypneic and with leukocytosis  -Also have ELIZABETH on admission  -recently diagnosed with shingles, but patient has not started his prescription of the valacyclovir outpatient  -blood culture with no growth to date.    -continue valacyclovir for shingles and Zosyn for superimposed cellulitis  -Leukocytosis resolved 07/19

## 2023-07-21 NOTE — PT/OT/SLP PROGRESS
Physical Therapy Treatment    Patient Name:  Chato Bowie   MRN:  [id number]    Recommendations:     Discharge Recommendations: nursing facility, skilled  Discharge Equipment Recommendations: none  Barriers to discharge:  Pt is not functioning at Mod I prior level and is at increased risk for falls, readmission, and morbidity if he returns to prior living arrangements at present time    Assessment:     Chato Bowie is a 72 y.o. male admitted with a medical diagnosis of Sepsis.  He presents with the following impairments/functional limitations: weakness, impaired endurance, impaired functional mobility, gait instability, impaired balance, decreased coordination, decreased upper extremity function, decreased lower extremity function, decreased safety awareness, pain, decreased ROM, impaired skin, edema, impaired cardiopulmonary response to activity.    Pt stood and took ~5 sidesteps using RW with Moderate assistance of 2 persons today with Max encouragement. Pt will cont to benefit from skilled therapy to reach goals.    Rehab Prognosis: Fair; patient would benefit from acute skilled PT services to address these deficits and reach maximum level of function.    Recent Surgery: * No surgery found *      Plan:     During this hospitalization, patient to be seen  (3-5x/wk) to address the identified rehab impairments via gait training, therapeutic activities, therapeutic exercises, neuromuscular re-education, wheelchair management/training and progress toward the following goals:    Plan of Care Expires:  08/01/23    Subjective     Chief Complaint:  pain to lateral side of R Hip by the blister popped  Patient/Family Comments/goals: Pt agreed to participate  Pain/Comfort:  Pain Rating 1:  (a lot. pt did not rate)  Location - Side 1: Right  Location - Orientation 1:  (lateral/posterior)  Location 1: hip  Pain Addressed 1: Pre-medicate for activity, Reposition, Distraction, Cessation of Activity, Nurse  notified      Objective:     Communicated with nurse William prior to session.  Patient found HOB elevated with telemetry, peripheral IV, pressure relief boots, jackson catheter upon PT entry to room.     General Precautions: Standard, fall, diabetic  Orthopedic Precautions: N/A  Braces: N/A  Respiratory Status: Room air     Functional Mobility:  Bed Mobility:     Scooting: CGA  Supine to Sit: minium assistance of 2 persons for Trunk and BLE management with HOB elevated; v/c for   Sit to Supine: moderate assistance of 2 persons for Trunk and BLE management  Transfers:  Gait belt don prior OOB activity   Sit to Stand: from EOB with moderate assistance of 2 persons with rolling walker  Gait: Pt took ~5 sidesteps along bedside toward HOB with moderate assistance of 2 persons using RW. Pt with flexed posture, decreased fanta/step length/weight shifting/foot off floor clearance; v/t cues for upright posture, increase step length, clear foot off floor, weight shifting, AD management. Pt declined for further ambulation despite Max encouragement.   Balance: Fair+ Sitting; Fair Standing    Pt jose ~25 sitting at EOB for BUE/BLE ex with SBA for safety    Noticing some dried and wet stool, dependent for yuridia hygiene in L sidelying    AM-PAC 6 CLICK MOBILITY  Turning over in bed (including adjusting bedclothes, sheets and blankets)?: 3  Sitting down on and standing up from a chair with arms (e.g., wheelchair, bedside commode, etc.): 3  Moving from lying on back to sitting on the side of the bed?: 3  Moving to and from a bed to a chair (including a wheelchair)?: 3  Need to walk in hospital room?: 3  Climbing 3-5 steps with a railing?: 1  Basic Mobility Total Score: 16       Treatment & Education:  Re-encouraged/re-educated pt on importance of Bed Mobility and performing BLE ex throughout the day, pt verbalized understanding.     BLE ex in seated 10 reps AP, LAQ, PS (having pt counting number out loud)    Patient left  HOB elevated with R side offloaded by wedge, pillow between knees, B Pressure Relief Boots, tray table at bedside, all lines intact, call button in reach, bed alarm on, and nurse notified.    GOALS:   Multidisciplinary Problems       Physical Therapy Goals          Problem: Physical Therapy    Goal Priority Disciplines Outcome Goal Variances Interventions   Physical Therapy Goal     PT, PT/OT Ongoing, Progressing     Description: Goals to be met by: 23     Patient will increase functional independence with mobility by performin. Supine to sit with Modified [place]  2. Rolling to Left and Right with Modified [place].  3. Sit to stand transfer with Modified [place]  4. Bed to chair transfer with Modified [place]    5. Gait  x 5-10 feet with Modified [place] using Rolling Walker.   6. Wheelchair propulsion x50-100 feet with Modified [place]   7. Lower extremity exercise program x10 reps per handout, with independence                         Time Tracking:     PT Received On: 23  PT Start Time: 1208     PT Stop Time: 1248  PT Total Time (min): 40 min     Billable Minutes: Gait Training 12 min, Therapeutic Activity 16 min, and Therapeutic Exercise 12 min (Co-treated with OT)    Co- treatment performed due to patient's multiple deficits requiring two skilled therapists to appropriately and safely assess patient's strength and endurance while facilitating functional tasks in addition to accommodating for patient's activity tolerance      Treatment Type: Treatment  PT/PTA: PTA     Number of PTA visits since last PT visit: 2     2023

## 2023-07-21 NOTE — NURSING
Patient AAOx4, respirations even and unlabored on room air, no acute distress noted. Wound care performed per order. Incontinence associated dermatitis to gluteal cleft isn't improving, wound care consulted. Pain well controlled with current PRN Norco. All safety precautions maintained. Will continue to monitor.

## 2023-07-21 NOTE — PROGRESS NOTES
[facility] Medicine  Progress Note    Patient Name: Chato Bowie  MRN: [id number]  Patient Class: IP- Inpatient   Admission Date: 7/17/2023  Length of Stay: 4 days  Attending Physician: Tiffanie Rojas DO  Primary Care Provider: Irlanda Valenzuela        Subjective:     Principal Problem:Sepsis        HPI:  Mr. Bowie is a 72-year-old with extensive past medical history including CHF, arthritis, coronary artery disease, diabetes mellitus type 2, hypertension and hyperlipidemia who presents to the emergency department for evaluation of pain and swelling.  Patient was recently diagnosed with shingles and sent home with foul acyclovir.  Patient was found at home unable to get up from his chair and sitting in urine and feces.  Patient is not able to give much history in the emergency department so most of this history was obtained from medical review and providers in the emergency department.    In the emergency department, patient was found to be febrile of 100.8 with initial blood pressure 125/68 saturating 98% on room air.  Lab work revealed a leukocytosis of 17.4, acute on chronic kidney disease with creatinine 5.4  with elevated procalcitonin.  Patient underwent ultrasound of lower extremities which did not reveal DVT.  CT abdomen and pelvis did not show any acute intra-abdominal abnormalities but does note soft tissue edema in the bilateral hip regions.  Patient also found to have concerns for infection of right flank and hip wounds.  Patient admitted to hospital medicine for further management.        Overview/Hospital Course:   72-year-old with extensive past medical history including CHF, arthritis, coronary artery disease, diabetes mellitus type 2, hypertension and hyperlipidemia admitted on 7/17/2023 for sepsis likely secondary to shingles and overlying cellulitis and acute kidney injury. Stated on IV antibiotics and continued on valacyclovir for shingles treatment. Wound care  consulted. Also found to have elevated troponin and hypokalemia.  Electrolytes replaced as needed.  Suspect troponin elevation due to demand in the setting of sepsis and ELIZABETH.  ECG reviewed and read as unusual P axis. Has known LBBB. Recent echo with normal EF and grade II diastolic dysfunction.  Troponin trended down.  Patient without any chest pain or shortness of breath. ELIZABETH improving and leukocytosis resolved. PT/OT consulted-recommends SNF. Patient unable to care for himself, will likely need to transition to residential NH. Patient medically stable for discharge to SNF. Awaiting placement.       Interval History:  No acute overnight events.  Patient remained afebrile overnight.  Has pain to right buttock but controlled with pain medications.  Clinically appears better this morning. Complains of generalized weakness. Alert oriented x4.    Review of Systems   Constitutional:  Negative for chills, fatigue and fever.   Respiratory:  Negative for cough and shortness of breath.    Cardiovascular:  Positive for leg swelling (chronic, history of anarsarca). Negative for chest pain and palpitations.   Gastrointestinal:  Negative for abdominal pain, diarrhea, nausea and vomiting.   Genitourinary:  Negative for difficulty urinating, dysuria, frequency and hematuria.   Musculoskeletal:  Positive for gait problem and myalgias. Negative for joint swelling.   Skin:  Positive for rash.   Neurological:  Positive for weakness. Negative for dizziness and headaches.     Objective:     Vital Signs (Most Recent):  Temp: 99 °F (37.2 °C) (07/21/23 0830)  Pulse: 70 (07/21/23 0944)  Resp: 18 (07/21/23 1130)  BP: (!) 153/70 (07/21/23 0944)  SpO2: 100 % (07/21/23 0830) Vital Signs (24h Range):  Temp:  [97.9 °F (36.6 °C)-99.4 °F (37.4 °C)] 99 °F (37.2 °C)  Pulse:  [70-77] 70  Resp:  [18-22] 18  SpO2:  [96 %-100 %] 100 %  BP: (152-178)/(67-81) 153/70     Weight: 99.2 kg (218 lb 11.2 oz)  Body mass index is 30.5 kg/m².    Intake/Output Summary  (Last 24 hours) at 7/21/2023 1236  Last data filed at 7/21/2023 0433  Gross per 24 hour   Intake 120 ml   Output 450 ml   Net -330 ml           Physical Exam  Vitals and nursing note reviewed.   Constitutional:       General: He is not in acute distress.     Appearance: He is obese. He is ill-appearing (chronically).   HENT:      Mouth/Throat:      Mouth: Mucous membranes are moist.   Eyes:      Extraocular Movements: Extraocular movements intact.   Cardiovascular:      Rate and Rhythm: Normal rate and regular rhythm.      Heart sounds: No murmur heard.    No gallop.   Pulmonary:      Effort: Pulmonary effort is normal. No respiratory distress.      Breath sounds: No wheezing.   Abdominal:      General: Bowel sounds are normal. There is no distension.      Tenderness: There is no abdominal tenderness. There is no guarding.   Musculoskeletal:         General: Swelling present.   Skin:     General: Skin is warm.      Findings: Rash present.      Comments: Noted lesion to right heel, right lateral calf as well as skin lesions to the right flank.  Refer to media images   Neurological:      Mental Status: He is alert and oriented to person, place, and time.      Motor: Weakness (generaized weakness) present.   Psychiatric:         Mood and Affect: Mood normal.         Behavior: Behavior normal.           Significant Labs: All pertinent labs within the past 24 hours have been reviewed.    Significant Imaging: I have reviewed all pertinent imaging results/findings within the past 24 hours.      Assessment/Plan:      * Sepsis  This patient does have evidence of infective focus  My overall impression is sepsis.  Source: left knee vs superimposed cellulitis (shingles).  Suspect source is cellulitis at this time.  Antibiotics given-   Antibiotics (72h ago, onward)    Start     Stop Route Frequency Ordered    07/19/23 0900  mupirocin 2 % ointment         07/24 0859 Nasl 2 times daily 07/19/23 0656    07/17/23 1230   piperacillin-tazobactam (ZOSYN) 4.5 g in dextrose 5 % in water (D5W) 5 % 100 mL IVPB (MB+)         -- IV Every 8 hours (non-standard times) 07/17/23 1123        Latest lactate reviewed-  No results for input(s): LACTATE in the last 72 hours.  Organ dysfunction indicated by Acute kidney injury and Encephalopathy    Fluid challenge given 2 liters NS in ED - not hypotensive    Post- resuscitation assessment Yes Perfusion exam was performed within 6 hours of septic shock presentation after bolus shows Adequate tissue perfusion assessed by non-invasive monitoring       Will Not start Pressors  Source control achieved by: IV antibiotics    -Presented febrile, tachypneic and with leukocytosis  -Also have ELIZABETH on admission  -recently diagnosed with shingles, but patient has not started his prescription of the valacyclovir outpatient  -blood culture with no growth to date.    -continue valacyclovir for shingles and Zosyn for superimposed cellulitis  -Leukocytosis resolved 07/19    Acute kidney injury superimposed on chronic kidney disease  -Presents with  and creatinine of 5.4 with baseline of 2.5-2.8.    -BNP 99 when usually patient has BNP greater than 300.    -Likely volume depletion in the setting of sepsis and poor p.o. intake.    -He was given 2 L normal saline in the emergency department.  Currently with good urine output.  -De Leon catheter in place.  -Renal US normal  -Continue to monitor, if no improvement plans for Nephrology consult  -Recheck lab work in the morning  -Cr improving     Skin rash  -Diagnosed with shingles and prescribed valacyclovir outpatient   -He states that this was picked up but did not start treatment    -Continue renal dose valacyclovir as well as IV antibiotics for superimposed bacterial infection and source of sepsis.  Precautions noted.  -Wound care consult      Hypokalemia  -Replace as needed      Essential hypertension  -blood pressure elevated, patient is stable   -resume home  hydralazine, metoprolol succinate, and amiodarone  -Increased home hydralazine to 100mg TID on 07/20    Acute encephalopathy  Patient appears encephalopathic and different from his usual baseline on admission.  His BUN is 114 so uremic encephalopathy is on differential as well as sepsis.  Suspect these are etiology but will CT head for completeness.  - Ct head: No acute intracranial abnormalities identified.  No significant detrimental change compared to recent CT head 06/01/2023.   - AOx4, mental status normal and at baseline on 07/18  -Resolved      Elevated troponin  -Presents with sepsis and found to have elevated troponin of 0.183.   -Denies chest pain. ECG reviewed and read as unusual P axis. Has known LBBB.   -Recent echo with normal EF and grade II diastolic dysfunction.   -Suspect this is demand in setting of sepsis.   -trend troponin, downtrend      Type 2 diabetes mellitus with hyperglycemia  Patient's FSGs are controlled on current medication regimen.  Last A1c reviewed-   Lab Results   Component Value Date    HGBA1C 7.8 (H) 06/02/2023     Most recent fingerstick glucose reviewed-   Recent Labs   Lab 07/20/23  1556 07/20/23  2039 07/21/23  0822   POCTGLUCOSE 164* 177* 139*     Current correctional scale  Low  Maintain anti-hyperglycemic dose as follows-   Antihyperglycemics (From admission, onward)    Start     Stop Route Frequency Ordered    07/17/23 1738  insulin aspart U-100 pen 0-5 Units         -- SubQ Before meals & nightly PRN 07/17/23 1641        Hold Oral hypoglycemics while patient is in the hospital.    Anasarca  This is chronic though patient appears volume depleted in the setting of sepsis.    Continue to monitor.      Left knee pain  -This is chronic, left knee appears swollen but did not appear erythematous or warm.    -XR left knee: No evidence of acute displaced fracture, dislocation, or osseous destructive process.  Severe joint space narrowing is seen at the medial tibiofemoral  compartment.  There is significant suprapatellar joint effusion      DJD (degenerative joint disease)  -Chronic issues, particularly left knee.  -XR of left knee: No evidence of acute displaced fracture, dislocation, or osseous destructive process.  Severe joint space narrowing is seen at the medial tibiofemoral compartment.  There is significant suprapatellar joint effusion      BPH (benign prostatic hyperplasia)  De Leon in place, resume Flomax  Voiding trial on 07/20      Falls  -PT/OT consulted: recommends SNF  -Medically clear for discharge to SNF. Awaiting placement     Class 1 obesity due to excess calories with serious comorbidity and body mass index (BMI) of 30.0 to 30.9 in adult  Body mass index is 30.5 kg/m². Morbid obesity complicates all aspects of disease management from diagnostic modalities to treatment. Weight loss encouraged and health benefits explained to patient.           VTE Risk Mitigation (From admission, onward)         Ordered     apixaban tablet 5 mg  2 times daily         07/17/23 1641     IP VTE HIGH RISK PATIENT  Once         07/17/23 1641     Place sequential compression device  Until discontinued         07/17/23 1641                Discharge Planning   ELY: 7/21/2023     Code Status: Full Code   Is the patient medically ready for discharge?:     Reason for patient still in hospital (select all that apply): Patient trending condition, Treatment, PT / OT recommendations and Pending disposition  Discharge Plan A: Skilled Nursing Facility   Discharge Delays: (!) Post-Acute Set-up              Tiffanie Rojas DO  Department of Hospital Medicine   [facility] [place] [facility] S[facility]

## 2023-07-21 NOTE — ASSESSMENT & PLAN NOTE
Patient's FSGs are controlled on current medication regimen.  Last A1c reviewed-   Lab Results   Component Value Date    HGBA1C 7.8 (H) 06/02/2023     Most recent fingerstick glucose reviewed-   Recent Labs   Lab 07/20/23  1556 07/20/23 2039 07/21/23  0822   POCTGLUCOSE 164* 177* 139*     Current correctional scale  Low  Maintain anti-hyperglycemic dose as follows-   Antihyperglycemics (From admission, onward)    Start     Stop Route Frequency Ordered    07/17/23 1738  insulin aspart U-100 pen 0-5 Units         -- SubQ Before meals & nightly PRN 07/17/23 1641        Hold Oral hypoglycemics while patient is in the hospital.

## 2023-07-21 NOTE — PLAN OF CARE
Problem: Skin Injury Risk Increased  Goal: Skin Health and Integrity  Intervention: Optimize Skin Protection  Flowsheets (Taken 7/21/2023 0315)  Pressure Reduction Techniques:   frequent weight shift encouraged   pressure points protected  Skin Protection:   incontinence pads utilized   tubing/devices free from skin contact  Intervention: Promote and Optimize Oral Intake  Flowsheets (Taken 7/21/2023 0315)  Oral Nutrition Promotion:   medicated   physical activity promoted   rest periods promoted

## 2023-07-21 NOTE — PLAN OF CARE
Problem: Adult Inpatient Plan of Care  Goal: Optimal Comfort and Wellbeing  Outcome: Ongoing, Progressing  Intervention: Monitor Pain and Promote Comfort  Flowsheets (Taken 7/21/2023 1817)  Pain Management Interventions:   care clustered   medication offered   pillow support provided   position adjusted   premedicated for activity

## 2023-07-21 NOTE — NURSING
[facility], [facility]  Nurses Note -- 4 Eyes      7/20/2023       Skin assessed on: Q Shift      [] No Pressure Injuries Present    []Prevention Measures Documented    [x] Yes LDA  for Pressure Injury Previously documented     [] Yes New Pressure Injury Discovered   [] LDA for New Pressure Injury Added      Attending RN:  Clary Mejia RN     Second RN:  alexandre kent

## 2023-07-21 NOTE — NURSING
[facility], [facility] - [place]  Nurses Note -- 4 Eyes      7/21/2023       Skin assessed on: Q Shift      [] No Pressure Injuries Present    []Prevention Measures Documented    [x] Yes LDA  for Pressure Injury Previously documented     [] Yes New Pressure Injury Discovered   [] LDA for New Pressure Injury Added      Attending RN:  Nataliia Laurent RN     Second RN:  IVONNE Oconnor

## 2023-07-21 NOTE — PROGRESS NOTES
Pharmacokinetic Assessment Follow Up: IV Vancomycin    Vancomycin serum concentration assessment(s):    The random level was drawn correctly and can be used to guide therapy at this time. The measurement is within the desired definitive target range of 10 to 20 mcg/mL.    Vancomycin Regimen Plan:    Vancomycin 750mg dose scheduled.    Re-dose when the random level is less than 20 mcg/mL, next level to be drawn at 0400 on 7/22/23    Drug levels (last 3 results):  Recent Labs   Lab Result Units 07/19/23  0548 07/20/23  0559 07/21/23  0456   Vancomycin, Random ug/mL 13.8 14.3 15.3       Pharmacy will continue to follow and monitor vancomycin.    Please contact pharmacy at extension [phone] for questions regarding this assessment.    Thank you for the consult,   Adriel Medina       Patient brief summary:  Chato Bowie is a 72 y.o. male initiated on antimicrobial therapy with IV Vancomycin for treatment of sepsis    The patient's current regimen is random pulse dosing    Drug Allergies:   Review of patient's allergies indicates:  No Known Allergies    Actual Body Weight:   99.2 kg    Renal Function:   Estimated Creatinine Clearance: 32.1 mL/min (A) (based on SCr of 2.5 mg/dL (H)).,     Dialysis Method (if applicable):  N/A    CBC (last 72 hours):  Recent Labs   Lab Result Units 07/18/23  0605 07/19/23  0548 07/20/23  0559   WBC K/uL 13.52* 9.06 8.64   Hemoglobin g/dL 10.9* 9.8* 8.0*   Hematocrit % 32.7* 29.3* 24.6*   Platelets K/uL 152 162 169   Gran % % 84.4* 81.1* 78.7*   Lymph % % 4.4* 7.5* 8.1*   Mono % % 9.5 9.2 11.6   Eosinophil % % 0.7 1.1 0.7   Basophil % % 0.1 0.2 0.2   Differential Method  Automated Automated Automated       Metabolic Panel (last 72 hours):  Recent Labs   Lab Result Units 07/18/23  0605 07/19/23  0548 07/20/23  0559   Sodium mmol/L 137 137 134*   Potassium mmol/L 3.3* 3.3* 3.2*   Chloride mmol/L 100 101 101   CO2 mmol/L 25 26 24   Glucose mg/dL 164* 148* 157*   BUN mg/dL 94* 80* 59*    Creatinine mg/dL 3.9* 3.0* 2.5*   Albumin g/dL 2.6* 2.2* 2.0*   Total Bilirubin mg/dL 0.9 0.8 0.8   Alkaline Phosphatase U/L 60 53* 49*   AST U/L 19 14 11   ALT U/L 18 16 14   Magnesium mg/dL 2.1 1.9 1.7   Phosphorus mg/dL 4.1 2.7 2.1*       Vancomycin Administrations:  vancomycin given in the last 96 hours                     vancomycin 750 mg in dextrose 5 % (D5W) 250 mL IVPB (Vial-Mate) (mg) 750 mg New Bag 07/20/23 1456    vancomycin 750 mg in dextrose 5 % (D5W) 250 mL IVPB (Vial-Mate) (mg) 750 mg New Bag 07/19/23 0815    vancomycin (VANCOCIN) 500 mg in dextrose 5 % in water (D5W) 5 % 100 mL IVPB (MB+) (mg) 500 mg New Bag 07/18/23 0938    vancomycin (VANCOCIN) 2,000 mg in dextrose 5 % (D5W) 500 mL IVPB (mg) 2,000 mg New Bag 07/17/23 1307                    Microbiologic Results:  Microbiology Results (last 7 days)       Procedure Component Value Units Date/Time    Blood culture x two cultures. Draw prior to antibiotics. [[id number]] Collected: 07/17/23 1241    Order Status: Completed Specimen: Blood from Peripheral, Hand, Right Updated: 07/20/23 1303     Blood Culture, Routine No Growth to date      No Growth to date      No Growth to date      No Growth to date    Narrative:      Aerobic and anaerobic    Blood culture x two cultures. Draw prior to antibiotics. [[id number]] Collected: 07/17/23 1239    Order Status: Completed Specimen: Blood from Peripheral, Antecubital, Left Updated: 07/20/23 1303     Blood Culture, Routine No Growth to date      No Growth to date      No Growth to date      No Growth to date    Narrative:      Aerobic and anaerobic

## 2023-07-21 NOTE — SUBJECTIVE & OBJECTIVE
Interval History:  No acute overnight events.  Patient remained afebrile overnight.  Has pain to right buttock but controlled with pain medications.  Clinically appears better this morning. Complains of generalized weakness. Alert oriented x4.    Review of Systems   Constitutional:  Negative for chills, fatigue and fever.   Respiratory:  Negative for cough and shortness of breath.    Cardiovascular:  Positive for leg swelling (chronic, history of anarsarca). Negative for chest pain and palpitations.   Gastrointestinal:  Negative for abdominal pain, diarrhea, nausea and vomiting.   Genitourinary:  Negative for difficulty urinating, dysuria, frequency and hematuria.   Musculoskeletal:  Positive for gait problem and myalgias. Negative for joint swelling.   Skin:  Positive for rash.   Neurological:  Positive for weakness. Negative for dizziness and headaches.     Objective:     Vital Signs (Most Recent):  Temp: 99 °F (37.2 °C) (07/21/23 0830)  Pulse: 70 (07/21/23 0944)  Resp: 18 (07/21/23 1130)  BP: (!) 153/70 (07/21/23 0944)  SpO2: 100 % (07/21/23 0830) Vital Signs (24h Range):  Temp:  [97.9 °F (36.6 °C)-99.4 °F (37.4 °C)] 99 °F (37.2 °C)  Pulse:  [70-77] 70  Resp:  [18-22] 18  SpO2:  [96 %-100 %] 100 %  BP: (152-178)/(67-81) 153/70     Weight: 99.2 kg (218 lb 11.2 oz)  Body mass index is 30.5 kg/m².    Intake/Output Summary (Last 24 hours) at 7/21/2023 1236  Last data filed at 7/21/2023 0433  Gross per 24 hour   Intake 120 ml   Output 450 ml   Net -330 ml           Physical Exam  Vitals and nursing note reviewed.   Constitutional:       General: He is not in acute distress.     Appearance: He is obese. He is ill-appearing (chronically).   HENT:      Mouth/Throat:      Mouth: Mucous membranes are moist.   Eyes:      Extraocular Movements: Extraocular movements intact.   Cardiovascular:      Rate and Rhythm: Normal rate and regular rhythm.      Heart sounds: No murmur heard.    No gallop.   Pulmonary:      Effort:  Pulmonary effort is normal. No respiratory distress.      Breath sounds: No wheezing.   Abdominal:      General: Bowel sounds are normal. There is no distension.      Tenderness: There is no abdominal tenderness. There is no guarding.   Musculoskeletal:         General: Swelling present.   Skin:     General: Skin is warm.      Findings: Rash present.      Comments: Noted lesion to right heel, right lateral calf as well as skin lesions to the right flank.  Refer to media images   Neurological:      Mental Status: He is alert and oriented to person, place, and time.      Motor: Weakness (generaized weakness) present.   Psychiatric:         Mood and Affect: Mood normal.         Behavior: Behavior normal.           Significant Labs: All pertinent labs within the past 24 hours have been reviewed.    Significant Imaging: I have reviewed all pertinent imaging results/findings within the past 24 hours.

## 2023-07-21 NOTE — PROGRESS NOTES
Awaiting vanc random trough lab draw and result timed and due 7/22/23 at 0400. 750mg ordered to be given today.  Patient's scr has slightly improved. Will continue to monitor.  Thank you for the consult.

## 2023-07-21 NOTE — PLAN OF CARE
Problem: Physical Therapy  Goal: Physical Therapy Goal  Description: Goals to be met by: 23     Patient will increase functional independence with mobility by performin. Supine to sit with Modified [place]  2. Rolling to Left and Right with Modified [place].  3. Sit to stand transfer with Modified [place]  4. Bed to chair transfer with Modified [place]    5. Gait  x 5-10 feet with Modified [place] using Rolling Walker.   6. Wheelchair propulsion x50-100 feet with Modified [place]   7. Lower extremity exercise program x10 reps per handout, with independence    Outcome: Ongoing, Progressing

## 2023-07-22 LAB
ANION GAP SERPL CALC-SCNC: 8 MMOL/L (ref 8–16)
BUN SERPL-MCNC: 42 MG/DL (ref 8–23)
CALCIUM SERPL-MCNC: 8.7 MG/DL (ref 8.7–10.5)
CHLORIDE SERPL-SCNC: 101 MMOL/L (ref 95–110)
CO2 SERPL-SCNC: 27 MMOL/L (ref 23–29)
CREAT SERPL-MCNC: 2.1 MG/DL (ref 0.5–1.4)
EST. GFR  (NO RACE VARIABLE): 33 ML/MIN/1.73 M^2
GLUCOSE SERPL-MCNC: 96 MG/DL (ref 70–110)
MAGNESIUM SERPL-MCNC: 1.8 MG/DL (ref 1.6–2.6)
PHOSPHATE SERPL-MCNC: 3.3 MG/DL (ref 2.7–4.5)
POCT GLUCOSE: 103 MG/DL (ref 70–110)
POCT GLUCOSE: 111 MG/DL (ref 70–110)
POCT GLUCOSE: 177 MG/DL (ref 70–110)
POCT GLUCOSE: 90 MG/DL (ref 70–110)
POTASSIUM SERPL-SCNC: 4.9 MMOL/L (ref 3.5–5.1)
SODIUM SERPL-SCNC: 136 MMOL/L (ref 136–145)

## 2023-07-22 PROCEDURE — 83735 ASSAY OF MAGNESIUM: CPT | Performed by: STUDENT IN AN ORGANIZED HEALTH CARE EDUCATION/TRAINING PROGRAM

## 2023-07-22 PROCEDURE — 27000207 HC ISOLATION

## 2023-07-22 PROCEDURE — 63600175 PHARM REV CODE 636 W HCPCS: Performed by: STUDENT IN AN ORGANIZED HEALTH CARE EDUCATION/TRAINING PROGRAM

## 2023-07-22 PROCEDURE — 25000003 PHARM REV CODE 250: Performed by: STUDENT IN AN ORGANIZED HEALTH CARE EDUCATION/TRAINING PROGRAM

## 2023-07-22 PROCEDURE — 80048 BASIC METABOLIC PNL TOTAL CA: CPT | Performed by: STUDENT IN AN ORGANIZED HEALTH CARE EDUCATION/TRAINING PROGRAM

## 2023-07-22 PROCEDURE — 36415 COLL VENOUS BLD VENIPUNCTURE: CPT | Performed by: STUDENT IN AN ORGANIZED HEALTH CARE EDUCATION/TRAINING PROGRAM

## 2023-07-22 PROCEDURE — 99900035 HC TECH TIME PER 15 MIN (STAT)

## 2023-07-22 PROCEDURE — 94761 N-INVAS EAR/PLS OXIMETRY MLT: CPT

## 2023-07-22 PROCEDURE — 11000001 HC ACUTE MED/SURG PRIVATE ROOM

## 2023-07-22 PROCEDURE — 84100 ASSAY OF PHOSPHORUS: CPT | Performed by: STUDENT IN AN ORGANIZED HEALTH CARE EDUCATION/TRAINING PROGRAM

## 2023-07-22 RX ORDER — AMLODIPINE BESYLATE 5 MG/1
10 TABLET ORAL DAILY
Status: DISCONTINUED | OUTPATIENT
Start: 2023-07-22 | End: 2023-07-25 | Stop reason: HOSPADM

## 2023-07-22 RX ORDER — HYDRALAZINE HYDROCHLORIDE 20 MG/ML
10 INJECTION INTRAMUSCULAR; INTRAVENOUS EVERY 8 HOURS PRN
Status: DISCONTINUED | OUTPATIENT
Start: 2023-07-22 | End: 2023-07-25 | Stop reason: HOSPADM

## 2023-07-22 RX ADMIN — PIPERACILLIN AND TAZOBACTAM 4.5 G: 4; .5 INJECTION, POWDER, LYOPHILIZED, FOR SOLUTION INTRAVENOUS; PARENTERAL at 11:07

## 2023-07-22 RX ADMIN — PIPERACILLIN AND TAZOBACTAM 4.5 G: 4; .5 INJECTION, POWDER, LYOPHILIZED, FOR SOLUTION INTRAVENOUS; PARENTERAL at 08:07

## 2023-07-22 RX ADMIN — HYDRALAZINE HYDROCHLORIDE 100 MG: 25 TABLET, FILM COATED ORAL at 05:07

## 2023-07-22 RX ADMIN — TAMSULOSIN HYDROCHLORIDE 0.4 MG: 0.4 CAPSULE ORAL at 09:07

## 2023-07-22 RX ADMIN — METOPROLOL SUCCINATE 25 MG: 25 TABLET, EXTENDED RELEASE ORAL at 09:07

## 2023-07-22 RX ADMIN — ONDANSETRON 8 MG: 8 TABLET, ORALLY DISINTEGRATING ORAL at 05:07

## 2023-07-22 RX ADMIN — FERROUS SULFATE TAB 325 MG (65 MG ELEMENTAL FE) 1 EACH: 325 (65 FE) TAB at 09:07

## 2023-07-22 RX ADMIN — AMLODIPINE BESYLATE 10 MG: 5 TABLET ORAL at 09:07

## 2023-07-22 RX ADMIN — AMIODARONE HYDROCHLORIDE 200 MG: 200 TABLET ORAL at 09:07

## 2023-07-22 RX ADMIN — APIXABAN 5 MG: 5 TABLET, FILM COATED ORAL at 08:07

## 2023-07-22 RX ADMIN — PIPERACILLIN AND TAZOBACTAM 4.5 G: 4; .5 INJECTION, POWDER, LYOPHILIZED, FOR SOLUTION INTRAVENOUS; PARENTERAL at 04:07

## 2023-07-22 RX ADMIN — HYDRALAZINE HYDROCHLORIDE 100 MG: 25 TABLET, FILM COATED ORAL at 09:07

## 2023-07-22 RX ADMIN — POLYETHYLENE GLYCOL 3350 17 G: 17 POWDER, FOR SOLUTION ORAL at 09:07

## 2023-07-22 RX ADMIN — HYDROCODONE BITARTRATE AND ACETAMINOPHEN 1 TABLET: 10; 325 TABLET ORAL at 01:07

## 2023-07-22 RX ADMIN — VALACYCLOVIR HYDROCHLORIDE 1000 MG: 500 TABLET, FILM COATED ORAL at 09:07

## 2023-07-22 RX ADMIN — MUPIROCIN: 20 OINTMENT TOPICAL at 09:07

## 2023-07-22 RX ADMIN — HYDRALAZINE HYDROCHLORIDE 100 MG: 25 TABLET, FILM COATED ORAL at 01:07

## 2023-07-22 RX ADMIN — APIXABAN 5 MG: 5 TABLET, FILM COATED ORAL at 09:07

## 2023-07-22 RX ADMIN — HYDROCODONE BITARTRATE AND ACETAMINOPHEN 1 TABLET: 10; 325 TABLET ORAL at 04:07

## 2023-07-22 RX ADMIN — HYDROCODONE BITARTRATE AND ACETAMINOPHEN 1 TABLET: 10; 325 TABLET ORAL at 10:07

## 2023-07-22 NOTE — PROGRESS NOTES
[facility]  Progress Note    Patient Name: Chato Bowie  MRN: [id number]  Patient Class: IP- Inpatient   Admission Date: 7/17/2023  Length of Stay: 5 days  Attending Physician: Tiffanie Rojas DO  Primary Care Provider: Irlanda Valenzuela        Subjective:     Principal Problem:Sepsis        HPI:  Mr. Bowie is a 72-year-old with extensive past medical history including CHF, arthritis, coronary artery disease, diabetes mellitus type 2, hypertension and hyperlipidemia who presents to the emergency department for evaluation of pain and swelling.  Patient was recently diagnosed with shingles and sent home with foul acyclovir.  Patient was found at home unable to get up from his chair and sitting in urine and feces.  Patient is not able to give much history in the emergency department so most of this history was obtained from medical review and providers in the emergency department.    In the emergency department, patient was found to be febrile of 100.8 with initial blood pressure 125/68 saturating 98% on room air.  Lab work revealed a leukocytosis of 17.4, acute on chronic kidney disease with creatinine 5.4  with elevated procalcitonin.  Patient underwent ultrasound of lower extremities which did not reveal DVT.  CT abdomen and pelvis did not show any acute intra-abdominal abnormalities but does note soft tissue edema in the bilateral hip regions.  Patient also found to have concerns for infection of right flank and hip wounds.  Patient admitted to hospital medicine for further management.        Overview/Hospital Course:   72-year-old with extensive past medical history including CHF, arthritis, coronary artery disease, diabetes mellitus type 2, hypertension and hyperlipidemia admitted on 7/17/2023 for sepsis likely secondary to shingles and overlying cellulitis and acute kidney injury. Stated on IV antibiotics and continued on valacyclovir for shingles treatment. Wound care  consulted. Also found to have elevated troponin and hypokalemia.  Electrolytes replaced as needed.  Suspect troponin elevation due to demand in the setting of sepsis and ELIZABETH.  ECG reviewed and read as unusual P axis. Has known LBBB. Recent echo with normal EF and grade II diastolic dysfunction.  Troponin trended down.  Patient without any chest pain or shortness of breath. ELIZABETH improving and leukocytosis resolved. PT/OT consulted-recommends SNF. Patient unable to care for himself, will likely need to transition to alf NH. Patient medically clear and stable for discharge to SNF. Awaiting placement.       Interval History:  No acute overnight events.  Patient remained afebrile overnight.  Was sleeping comfortably before my exam. Pain controlled at this time. Clinically appears better this morning. Complains of generalized weakness. Alert oriented x4.    Review of Systems   Constitutional:  Negative for chills, fatigue and fever.   Respiratory:  Negative for cough and shortness of breath.    Cardiovascular:  Positive for leg swelling (chronic, history of anarsarca). Negative for chest pain and palpitations.   Gastrointestinal:  Negative for abdominal pain, diarrhea, nausea and vomiting.   Genitourinary:  Negative for difficulty urinating, dysuria, frequency and hematuria.   Musculoskeletal:  Positive for gait problem and myalgias. Negative for joint swelling.   Skin:  Positive for rash.   Neurological:  Positive for weakness. Negative for dizziness and headaches.     Objective:     Vital Signs (Most Recent):  Temp: 98.3 °F (36.8 °C) (07/22/23 1224)  Pulse: 73 (07/22/23 1224)  Resp: 18 (07/22/23 1224)  BP: (!) 155/74 (07/22/23 1304)  SpO2: 97 % (07/22/23 1224) Vital Signs (24h Range):  Temp:  [97.8 °F (36.6 °C)-99.4 °F (37.4 °C)] 98.3 °F (36.8 °C)  Pulse:  [69-80] 73  Resp:  [16-20] 18  SpO2:  [95 %-100 %] 97 %  BP: (155-169)/(69-77) 155/74     Weight: 99.2 kg (218 lb 11.2 oz)  Body mass index is 30.5  kg/m².    Intake/Output Summary (Last 24 hours) at 7/22/2023 1422  Last data filed at 7/22/2023 1230  Gross per 24 hour   Intake 480 ml   Output 1600 ml   Net -1120 ml           Physical Exam  Vitals and nursing note reviewed.   Constitutional:       General: He is not in acute distress.     Appearance: He is obese. He is ill-appearing (chronically).   HENT:      Mouth/Throat:      Mouth: Mucous membranes are moist.   Eyes:      Extraocular Movements: Extraocular movements intact.   Cardiovascular:      Rate and Rhythm: Normal rate and regular rhythm.      Heart sounds: No murmur heard.    No gallop.   Pulmonary:      Effort: Pulmonary effort is normal. No respiratory distress.      Breath sounds: No wheezing.   Abdominal:      General: Bowel sounds are normal. There is no distension.      Tenderness: There is no abdominal tenderness. There is no guarding.   Musculoskeletal:         General: Swelling present.   Skin:     General: Skin is warm.      Findings: Rash present.      Comments: Noted lesion to right heel, right lateral calf as well as skin lesions to the right flank.  Refer to media images   Neurological:      Mental Status: He is alert and oriented to person, place, and time.      Motor: Weakness (generaized weakness) present.   Psychiatric:         Mood and Affect: Mood normal.         Behavior: Behavior normal.           Significant Labs: All pertinent labs within the past 24 hours have been reviewed.    Significant Imaging: I have reviewed all pertinent imaging results/findings within the past 24 hours.      Assessment/Plan:      * Sepsis  This patient does have evidence of infective focus  My overall impression is sepsis.  Source: left knee vs superimposed cellulitis (shingles).  Suspect source is cellulitis at this time.  Antibiotics given-   Antibiotics (72h ago, onward)    Start     Stop Route Frequency Ordered    07/22/23 0430  piperacillin-tazobactam (ZOSYN) 4.5 g in dextrose 5 % in water (D5W) 5  % 100 mL IVPB (MB+)         -- IV Every 8 hours (non-standard times) 07/21/23 2244    07/19/23 0900  mupirocin 2 % ointment         07/24 0859 Nasl 2 times daily 07/19/23 0656        Latest lactate reviewed-  No results for input(s): LACTATE in the last 72 hours.  Organ dysfunction indicated by Acute kidney injury and Encephalopathy    Fluid challenge given 2 liters NS in ED - not hypotensive    Post- resuscitation assessment Yes Perfusion exam was performed within 6 hours of septic shock presentation after bolus shows Adequate tissue perfusion assessed by non-invasive monitoring       Will Not start Pressors  Source control achieved by: IV antibiotics    -Presented febrile, tachypneic and with leukocytosis  -Also have ELIZABETH on admission  -recently diagnosed with shingles, but patient has not started his prescription of the valacyclovir outpatient  -blood culture with no growth to date.    -continue valacyclovir for shingles and Zosyn for superimposed cellulitis  -Leukocytosis resolved 07/19    Acute kidney injury superimposed on chronic kidney disease  -Presents with  and creatinine of 5.4 with baseline of 2.5-2.8.    -BNP 99 when usually patient has BNP greater than 300.    -Likely volume depletion in the setting of sepsis and poor p.o. intake.    -He was given 2 L normal saline in the emergency department.  Currently with good urine output.  -De Leon catheter in place.  -Renal US normal  -Continue to monitor, if no improvement plans for Nephrology consult  -Cr improving, at baseline     Skin rash  -Diagnosed with shingles and prescribed valacyclovir outpatient   -He states that this was picked up but did not start treatment    -Continue renal dose valacyclovir as well as IV antibiotics for superimposed bacterial infection and source of sepsis.  Precautions noted.  -Wound care consult      Hypokalemia  -Replace as needed      Essential hypertension  -blood pressure elevated, patient is stable   -resume home  hydralazine, metoprolol succinate, and amiodarone  -Increased home hydralazine to 100mg TID on 07/20  -BP still elevated, add norvasc 10mg on 07/22  -IV hydralazine PRN for SBP>180    Acute encephalopathy  Patient appears encephalopathic and different from his usual baseline on admission.  His BUN is 114 so uremic encephalopathy is on differential as well as sepsis.  Suspect these are etiology but will CT head for completeness.  - Ct head: No acute intracranial abnormalities identified.  No significant detrimental change compared to recent CT head 06/01/2023.   - AOx4, mental status normal and at baseline on 07/18  -Resolved      Elevated troponin  -Presents with sepsis and found to have elevated troponin of 0.183.   -Denies chest pain. ECG reviewed and read as unusual P axis. Has known LBBB.   -Recent echo with normal EF and grade II diastolic dysfunction.   -Suspect this is demand in setting of sepsis.   -trend troponin, downtrend      Type 2 diabetes mellitus with hyperglycemia  Patient's FSGs are controlled on current medication regimen.  Last A1c reviewed-   Lab Results   Component Value Date    HGBA1C 7.8 (H) 06/02/2023     Most recent fingerstick glucose reviewed-   Recent Labs   Lab 07/21/23  1605 07/21/23  2018 07/22/23  0817 07/22/23  1222   POCTGLUCOSE 148* 121* 90 103     Current correctional scale  Low  Maintain anti-hyperglycemic dose as follows-   Antihyperglycemics (From admission, onward)    Start     Stop Route Frequency Ordered    07/17/23 1738  insulin aspart U-100 pen 0-5 Units         -- SubQ Before meals & nightly PRN 07/17/23 1641        Hold Oral hypoglycemics while patient is in the hospital.    Anasarca  This is chronic though patient appears volume depleted in the setting of sepsis.    Continue to monitor.      Left knee pain  -This is chronic, left knee appears swollen but did not appear erythematous or warm.    -XR left knee: No evidence of acute displaced fracture, dislocation, or  osseous destructive process.  Severe joint space narrowing is seen at the medial tibiofemoral compartment.  There is significant suprapatellar joint effusion      DJD (degenerative joint disease)  -Chronic issues, particularly left knee.  -XR of left knee: No evidence of acute displaced fracture, dislocation, or osseous destructive process.  Severe joint space narrowing is seen at the medial tibiofemoral compartment.  There is significant suprapatellar joint effusion      BPH (benign prostatic hyperplasia)  De Leon in place, resume Flomax  Voiding trial on 07/20      Falls  -PT/OT consulted: recommends SNF  -Medically clear for discharge to SNF. Awaiting placement     Class 1 obesity due to excess calories with serious comorbidity and body mass index (BMI) of 30.0 to 30.9 in adult  Body mass index is 30.5 kg/m². Morbid obesity complicates all aspects of disease management from diagnostic modalities to treatment. Weight loss encouraged and health benefits explained to patient.           VTE Risk Mitigation (From admission, onward)         Ordered     apixaban tablet 5 mg  2 times daily         07/17/23 1641     IP VTE HIGH RISK PATIENT  Once         07/17/23 1641     Place sequential compression device  Until discontinued         07/17/23 1641                Discharge Planning   ELY: 7/21/2023     Code Status: Full Code   Is the patient medically ready for discharge?:     Reason for patient still in hospital (select all that apply): Patient trending condition, Treatment, PT / OT recommendations and Pending disposition  Discharge Plan A: Skilled Nursing Facility   Discharge Delays: (!) Post-Acute Set-up              Tiffanie Rojas DO  Department of Hospital Medicine   [facility]

## 2023-07-22 NOTE — NURSING
pt's bp has been 160/70's, pt w/no prn antihypertensives, pt just medicated w/prn Hydrocodone, resting in bed, per MD cont to monitor

## 2023-07-22 NOTE — SUBJECTIVE & OBJECTIVE
Interval History:  No acute overnight events.  Patient remained afebrile overnight.  Was sleeping comfortably before my exam. Pain controlled at this time. Clinically appears better this morning. Complains of generalized weakness. Alert oriented x4.    Review of Systems   Constitutional:  Negative for chills, fatigue and fever.   Respiratory:  Negative for cough and shortness of breath.    Cardiovascular:  Positive for leg swelling (chronic, history of anarsarca). Negative for chest pain and palpitations.   Gastrointestinal:  Negative for abdominal pain, diarrhea, nausea and vomiting.   Genitourinary:  Negative for difficulty urinating, dysuria, frequency and hematuria.   Musculoskeletal:  Positive for gait problem and myalgias. Negative for joint swelling.   Skin:  Positive for rash.   Neurological:  Positive for weakness. Negative for dizziness and headaches.     Objective:     Vital Signs (Most Recent):  Temp: 98.3 °F (36.8 °C) (07/22/23 1224)  Pulse: 73 (07/22/23 1224)  Resp: 18 (07/22/23 1224)  BP: (!) 155/74 (07/22/23 1304)  SpO2: 97 % (07/22/23 1224) Vital Signs (24h Range):  Temp:  [97.8 °F (36.6 °C)-99.4 °F (37.4 °C)] 98.3 °F (36.8 °C)  Pulse:  [69-80] 73  Resp:  [16-20] 18  SpO2:  [95 %-100 %] 97 %  BP: (155-169)/(69-77) 155/74     Weight: 99.2 kg (218 lb 11.2 oz)  Body mass index is 30.5 kg/m².    Intake/Output Summary (Last 24 hours) at 7/22/2023 1422  Last data filed at 7/22/2023 1230  Gross per 24 hour   Intake 480 ml   Output 1600 ml   Net -1120 ml           Physical Exam  Vitals and nursing note reviewed.   Constitutional:       General: He is not in acute distress.     Appearance: He is obese. He is ill-appearing (chronically).   HENT:      Mouth/Throat:      Mouth: Mucous membranes are moist.   Eyes:      Extraocular Movements: Extraocular movements intact.   Cardiovascular:      Rate and Rhythm: Normal rate and regular rhythm.      Heart sounds: No murmur heard.    No gallop.   Pulmonary:       Effort: Pulmonary effort is normal. No respiratory distress.      Breath sounds: No wheezing.   Abdominal:      General: Bowel sounds are normal. There is no distension.      Tenderness: There is no abdominal tenderness. There is no guarding.   Musculoskeletal:         General: Swelling present.   Skin:     General: Skin is warm.      Findings: Rash present.      Comments: Noted lesion to right heel, right lateral calf as well as skin lesions to the right flank.  Refer to media images   Neurological:      Mental Status: He is alert and oriented to person, place, and time.      Motor: Weakness (generaized weakness) present.   Psychiatric:         Mood and Affect: Mood normal.         Behavior: Behavior normal.           Significant Labs: All pertinent labs within the past 24 hours have been reviewed.    Significant Imaging: I have reviewed all pertinent imaging results/findings within the past 24 hours.

## 2023-07-22 NOTE — NURSING
[facility] [place]  Nurses Note -- 4 Eyes      7/21/2023       Skin assessed on: Q Shift      [] No Pressure Injuries Present    []Prevention Measures Documented    [x] Yes LDA  for Pressure Injury Previously documented     [] Yes New Pressure Injury Discovered   [] LDA for New Pressure Injury Added      Attending RN:  Clary Mejia RN     Second RN:  alexandre kent

## 2023-07-22 NOTE — NURSING
Left flank lesions cleansed with Vashe and hydrogel applied as per order. Bleeding lesion in between buttocks cleaned with vashe and abd pad applied.

## 2023-07-22 NOTE — ASSESSMENT & PLAN NOTE
This patient does have evidence of infective focus  My overall impression is sepsis.  Source: left knee vs superimposed cellulitis (shingles).  Suspect source is cellulitis at this time.  Antibiotics given-   Antibiotics (72h ago, onward)    Start     Stop Route Frequency Ordered    07/22/23 0430  piperacillin-tazobactam (ZOSYN) 4.5 g in dextrose 5 % in water (D5W) 5 % 100 mL IVPB (MB+)         -- IV Every 8 hours (non-standard times) 07/21/23 2244    07/19/23 0900  mupirocin 2 % ointment         07/24 0859 Nasl 2 times daily 07/19/23 0656        Latest lactate reviewed-  No results for input(s): LACTATE in the last 72 hours.  Organ dysfunction indicated by Acute kidney injury and Encephalopathy    Fluid challenge given 2 liters NS in ED - not hypotensive    Post- resuscitation assessment Yes Perfusion exam was performed within 6 hours of septic shock presentation after bolus shows Adequate tissue perfusion assessed by non-invasive monitoring       Will Not start Pressors  Source control achieved by: IV antibiotics    -Presented febrile, tachypneic and with leukocytosis  -Also have ELIZABETH on admission  -recently diagnosed with shingles, but patient has not started his prescription of the valacyclovir outpatient  -blood culture with no growth to date.    -continue valacyclovir for shingles and Zosyn for superimposed cellulitis  -Leukocytosis resolved 07/19

## 2023-07-22 NOTE — ASSESSMENT & PLAN NOTE
-Presents with  and creatinine of 5.4 with baseline of 2.5-2.8.    -BNP 99 when usually patient has BNP greater than 300.    -Likely volume depletion in the setting of sepsis and poor p.o. intake.    -He was given 2 L normal saline in the emergency department.  Currently with good urine output.  -De Leon catheter in place.  -Renal US normal  -Continue to monitor, if no improvement plans for Nephrology consult  -Cr improving, at baseline

## 2023-07-22 NOTE — PLAN OF CARE
Problem: Adult Inpatient Plan of Care  Goal: Plan of Care Review  Outcome: Ongoing, Progressing  Goal: Patient-Specific Goal (Individualized)  Outcome: Ongoing, Progressing  Goal: Absence of Hospital-Acquired Illness or Injury  Outcome: Ongoing, Progressing  Goal: Optimal Comfort and Wellbeing  Outcome: Ongoing, Progressing  Goal: Readiness for Transition of Care  Outcome: Ongoing, Progressing     Problem: Skin Injury Risk Increased  Goal: Skin Health and Integrity  Outcome: Ongoing, Progressing     Problem: Infection  Goal: Absence of Infection Signs and Symptoms  Outcome: Ongoing, Progressing     Problem: Diabetes Comorbidity  Goal: Blood Glucose Level Within Targeted Range  Outcome: Ongoing, Progressing     Problem: Adjustment to Illness (Sepsis/Septic Shock)  Goal: Optimal Coping  Outcome: Ongoing, Progressing     Problem: Bleeding (Sepsis/Septic Shock)  Goal: Absence of Bleeding  Outcome: Ongoing, Progressing     Problem: Glycemic Control Impaired (Sepsis/Septic Shock)  Goal: Blood Glucose Level Within Desired Range  Outcome: Ongoing, Progressing     Problem: Infection Progression (Sepsis/Septic Shock)  Goal: Absence of Infection Signs and Symptoms  Outcome: Ongoing, Progressing     Problem: Nutrition Impaired (Sepsis/Septic Shock)  Goal: Optimal Nutrition Intake  Outcome: Ongoing, Progressing     Problem: Fluid and Electrolyte Imbalance (Acute Kidney Injury/Impairment)  Goal: Fluid and Electrolyte Balance  Outcome: Ongoing, Progressing     Problem: Oral Intake Inadequate (Acute Kidney Injury/Impairment)  Goal: Optimal Nutrition Intake  Outcome: Ongoing, Progressing     Problem: Renal Function Impairment (Acute Kidney Injury/Impairment)  Goal: Effective Renal Function  Outcome: Ongoing, Progressing     Problem: Fall Injury Risk  Goal: Absence of Fall and Fall-Related Injury  Outcome: Ongoing, Progressing

## 2023-07-22 NOTE — PLAN OF CARE
Problem: Skin Injury Risk Increased  Goal: Skin Health and Integrity  Intervention: Optimize Skin Protection  Flowsheets (Taken 7/22/2023 0315)  Pressure Reduction Techniques:   frequent weight shift encouraged   positioned off wounds   pressure points protected  Pressure Reduction Devices: heel offloading device utilized  Skin Protection:   incontinence pads utilized   tubing/devices free from skin contact  Intervention: Promote and Optimize Oral Intake  Flowsheets (Taken 7/22/2023 0315)  Oral Nutrition Promotion:   medicated   rest periods promoted   physical activity promoted

## 2023-07-22 NOTE — ASSESSMENT & PLAN NOTE
-blood pressure elevated, patient is stable   -resume home hydralazine, metoprolol succinate, and amiodarone  -Increased home hydralazine to 100mg TID on 07/20  -BP still elevated, add norvasc 10mg on 07/22  -IV hydralazine PRN for SBP>180

## 2023-07-22 NOTE — ASSESSMENT & PLAN NOTE
Patient's FSGs are controlled on current medication regimen.  Last A1c reviewed-   Lab Results   Component Value Date    HGBA1C 7.8 (H) 06/02/2023     Most recent fingerstick glucose reviewed-   Recent Labs   Lab 07/21/23  1605 07/21/23  2018 07/22/23  0817 07/22/23  1222   POCTGLUCOSE 148* 121* 90 103     Current correctional scale  Low  Maintain anti-hyperglycemic dose as follows-   Antihyperglycemics (From admission, onward)    Start     Stop Route Frequency Ordered    07/17/23 1738  insulin aspart U-100 pen 0-5 Units         -- SubQ Before meals & nightly PRN 07/17/23 1641        Hold Oral hypoglycemics while patient is in the hospital.

## 2023-07-23 LAB
POCT GLUCOSE: 154 MG/DL (ref 70–110)
POCT GLUCOSE: 189 MG/DL (ref 70–110)
POCT GLUCOSE: 208 MG/DL (ref 70–110)
POCT GLUCOSE: 213 MG/DL (ref 70–110)

## 2023-07-23 PROCEDURE — 25000003 PHARM REV CODE 250: Performed by: STUDENT IN AN ORGANIZED HEALTH CARE EDUCATION/TRAINING PROGRAM

## 2023-07-23 PROCEDURE — 11000001 HC ACUTE MED/SURG PRIVATE ROOM

## 2023-07-23 PROCEDURE — 27000207 HC ISOLATION

## 2023-07-23 PROCEDURE — 94761 N-INVAS EAR/PLS OXIMETRY MLT: CPT

## 2023-07-23 PROCEDURE — 63600175 PHARM REV CODE 636 W HCPCS: Performed by: STUDENT IN AN ORGANIZED HEALTH CARE EDUCATION/TRAINING PROGRAM

## 2023-07-23 RX ORDER — HYDROMORPHONE HYDROCHLORIDE 1 MG/ML
0.2 INJECTION, SOLUTION INTRAMUSCULAR; INTRAVENOUS; SUBCUTANEOUS EVERY 6 HOURS PRN
Status: DISCONTINUED | OUTPATIENT
Start: 2023-07-23 | End: 2023-07-25 | Stop reason: HOSPADM

## 2023-07-23 RX ORDER — ACETAMINOPHEN 500 MG
1000 TABLET ORAL EVERY 8 HOURS
Status: DISCONTINUED | OUTPATIENT
Start: 2023-07-23 | End: 2023-07-25 | Stop reason: HOSPADM

## 2023-07-23 RX ORDER — HYDROCODONE BITARTRATE AND ACETAMINOPHEN 5; 325 MG/1; MG/1
1 TABLET ORAL EVERY 4 HOURS PRN
Status: DISCONTINUED | OUTPATIENT
Start: 2023-07-23 | End: 2023-07-25 | Stop reason: HOSPADM

## 2023-07-23 RX ORDER — HYDROCODONE BITARTRATE AND ACETAMINOPHEN 10; 325 MG/1; MG/1
1 TABLET ORAL EVERY 4 HOURS PRN
Status: DISCONTINUED | OUTPATIENT
Start: 2023-07-23 | End: 2023-07-25 | Stop reason: HOSPADM

## 2023-07-23 RX ORDER — GABAPENTIN 100 MG/1
100 CAPSULE ORAL 2 TIMES DAILY
Status: DISCONTINUED | OUTPATIENT
Start: 2023-07-23 | End: 2023-07-25 | Stop reason: HOSPADM

## 2023-07-23 RX ADMIN — ACETAMINOPHEN 1000 MG: 500 TABLET, FILM COATED ORAL at 09:07

## 2023-07-23 RX ADMIN — APIXABAN 5 MG: 5 TABLET, FILM COATED ORAL at 08:07

## 2023-07-23 RX ADMIN — HYDROCODONE BITARTRATE AND ACETAMINOPHEN 1 TABLET: 10; 325 TABLET ORAL at 01:07

## 2023-07-23 RX ADMIN — MUPIROCIN: 20 OINTMENT TOPICAL at 08:07

## 2023-07-23 RX ADMIN — AMIODARONE HYDROCHLORIDE 200 MG: 200 TABLET ORAL at 08:07

## 2023-07-23 RX ADMIN — POLYETHYLENE GLYCOL 3350 17 G: 17 POWDER, FOR SOLUTION ORAL at 08:07

## 2023-07-23 RX ADMIN — METOPROLOL SUCCINATE 25 MG: 25 TABLET, EXTENDED RELEASE ORAL at 08:07

## 2023-07-23 RX ADMIN — PIPERACILLIN AND TAZOBACTAM 4.5 G: 4; .5 INJECTION, POWDER, LYOPHILIZED, FOR SOLUTION INTRAVENOUS; PARENTERAL at 05:07

## 2023-07-23 RX ADMIN — GABAPENTIN 100 MG: 100 CAPSULE ORAL at 08:07

## 2023-07-23 RX ADMIN — HYDROCODONE BITARTRATE AND ACETAMINOPHEN 1 TABLET: 10; 325 TABLET ORAL at 06:07

## 2023-07-23 RX ADMIN — HYDROMORPHONE HYDROCHLORIDE 0.2 MG: 1 INJECTION, SOLUTION INTRAMUSCULAR; INTRAVENOUS; SUBCUTANEOUS at 08:07

## 2023-07-23 RX ADMIN — FERROUS SULFATE TAB 325 MG (65 MG ELEMENTAL FE) 1 EACH: 325 (65 FE) TAB at 08:07

## 2023-07-23 RX ADMIN — INSULIN ASPART 2 UNITS: 100 INJECTION, SOLUTION INTRAVENOUS; SUBCUTANEOUS at 12:07

## 2023-07-23 RX ADMIN — HYDRALAZINE HYDROCHLORIDE 100 MG: 25 TABLET, FILM COATED ORAL at 01:07

## 2023-07-23 RX ADMIN — INSULIN ASPART 0 UNITS: 100 INJECTION, SOLUTION INTRAVENOUS; SUBCUTANEOUS at 10:07

## 2023-07-23 RX ADMIN — ACETAMINOPHEN 650 MG: 325 TABLET ORAL at 02:07

## 2023-07-23 RX ADMIN — ACETAMINOPHEN 1000 MG: 500 TABLET, FILM COATED ORAL at 04:07

## 2023-07-23 RX ADMIN — HYDROCODONE BITARTRATE AND ACETAMINOPHEN 1 TABLET: 10; 325 TABLET ORAL at 05:07

## 2023-07-23 RX ADMIN — PIPERACILLIN AND TAZOBACTAM 4.5 G: 4; .5 INJECTION, POWDER, LYOPHILIZED, FOR SOLUTION INTRAVENOUS; PARENTERAL at 08:07

## 2023-07-23 RX ADMIN — AMLODIPINE BESYLATE 10 MG: 5 TABLET ORAL at 08:07

## 2023-07-23 RX ADMIN — TAMSULOSIN HYDROCHLORIDE 0.4 MG: 0.4 CAPSULE ORAL at 08:07

## 2023-07-23 RX ADMIN — PIPERACILLIN AND TAZOBACTAM 4.5 G: 4; .5 INJECTION, POWDER, LYOPHILIZED, FOR SOLUTION INTRAVENOUS; PARENTERAL at 12:07

## 2023-07-23 RX ADMIN — HYDRALAZINE HYDROCHLORIDE 100 MG: 25 TABLET, FILM COATED ORAL at 05:07

## 2023-07-23 RX ADMIN — HYDRALAZINE HYDROCHLORIDE 100 MG: 25 TABLET, FILM COATED ORAL at 09:07

## 2023-07-23 RX ADMIN — VALACYCLOVIR HYDROCHLORIDE 1000 MG: 500 TABLET, FILM COATED ORAL at 08:07

## 2023-07-23 NOTE — NURSING
Left flank lesions cleansed with Vashe and hydrogel applied as per order. Bleeding lesion in between buttocks cleaned with vashe and abd pad applied

## 2023-07-23 NOTE — NURSING
[facility]  Nurses Note -- 4 Eyes      7/22/2023       Skin assessed on: Q Shift      [] No Pressure Injuries Present    []Prevention Measures Documented    [x] Yes LDA  for Pressure Injury Previously documented     [] Yes New Pressure Injury Discovered   [] LDA for New Pressure Injury Added      Attending RN:  Clary Mejia RN     Second RN:  ailyn kent

## 2023-07-23 NOTE — PROGRESS NOTES
[facility] Medicine  Progress Note    Patient Name: Chato Bowie  MRN: [id number]  Patient Class: IP- Inpatient   Admission Date: 7/17/2023  Length of Stay: 6 days  Attending Physician: Tiffanie Rojas DO  Primary Care Provider: Irlanda Valenzuela        Subjective:     Principal Problem:Sepsis        HPI:  Mr. Bowie is a 72-year-old with extensive past medical history including CHF, arthritis, coronary artery disease, diabetes mellitus type 2, hypertension and hyperlipidemia who presents to the emergency department for evaluation of pain and swelling.  Patient was recently diagnosed with shingles and sent home with foul acyclovir.  Patient was found at home unable to get up from his chair and sitting in urine and feces.  Patient is not able to give much history in the emergency department so most of this history was obtained from medical review and providers in the emergency department.    In the emergency department, patient was found to be febrile of 100.8 with initial blood pressure 125/68 saturating 98% on room air.  Lab work revealed a leukocytosis of 17.4, acute on chronic kidney disease with creatinine 5.4  with elevated procalcitonin.  Patient underwent ultrasound of lower extremities which did not reveal DVT.  CT abdomen and pelvis did not show any acute intra-abdominal abnormalities but does note soft tissue edema in the bilateral hip regions.  Patient also found to have concerns for infection of right flank and hip wounds.  Patient admitted to hospital medicine for further management.        Overview/Hospital Course:   72-year-old with extensive past medical history including CHF, arthritis, coronary artery disease, diabetes mellitus type 2, hypertension and hyperlipidemia admitted on 7/17/2023 for sepsis likely secondary to shingles and overlying cellulitis and acute kidney injury. Stated on IV antibiotics and continued on valacyclovir for shingles treatment. Wound care  consulted. Also found to have elevated troponin and hypokalemia.  Electrolytes replaced as needed.  Suspect troponin elevation due to demand in the setting of sepsis and ELIZABETH.  ECG reviewed and read as unusual P axis. Has known LBBB. Recent echo with normal EF and grade II diastolic dysfunction.  Troponin trended down.  Patient without any chest pain or shortness of breath. ELIZABETH improving and leukocytosis resolved. PT/OT consulted-recommends SNF. Patient unable to care for himself, will likely need to transition to long term NH.       Patient medically clear and stable for discharge to SNF. Awaiting placement.       Interval History:   NAEON.  Patient appears uncomfortable   States he couldn't sleep due to too much pain 2/2 rash  All questions answered and updates on care given.       Review of Systems   Musculoskeletal:  Positive for back pain and myalgias.   Skin:  Positive for color change and rash.     Objective:     Vital Signs (Most Recent):  Temp: 98.5 °F (36.9 °C) (07/23/23 0756)  Pulse: 73 (07/23/23 0756)  Resp: 20 (07/23/23 0804)  BP: (!) 145/67 (07/23/23 0756)  SpO2: 96 % (07/23/23 0756) Vital Signs (24h Range):  Temp:  [97.8 °F (36.6 °C)-98.7 °F (37.1 °C)] 98.5 °F (36.9 °C)  Pulse:  [73-79] 73  Resp:  [18-20] 20  SpO2:  [96 %-98 %] 96 %  BP: (138-194)/(66-82) 145/67     Weight: 99.2 kg (218 lb 11.2 oz)  Body mass index is 30.5 kg/m².    Intake/Output Summary (Last 24 hours) at 7/23/2023 0841  Last data filed at 7/23/2023 0226  Gross per 24 hour   Intake 240 ml   Output 1200 ml   Net -960 ml           Physical Exam  Vitals and nursing note reviewed.   Constitutional:       General: He is not in acute distress.     Appearance: He is obese. He is ill-appearing (chronically).   HENT:      Mouth/Throat:      Mouth: Mucous membranes are moist.   Eyes:      Extraocular Movements: Extraocular movements intact.   Cardiovascular:      Rate and Rhythm: Normal rate and regular rhythm.      Heart sounds: No murmur  heard.    No gallop.   Pulmonary:      Effort: Pulmonary effort is normal. No respiratory distress.      Breath sounds: No wheezing.   Abdominal:      General: Bowel sounds are normal. There is no distension.      Tenderness: There is no abdominal tenderness. There is no guarding.   Musculoskeletal:         General: Swelling present.   Skin:     General: Skin is warm.      Findings: Rash present.      Comments: Noted lesion to right heel, right lateral calf as well as skin lesions to the right flank.  Refer to media images   Neurological:      Mental Status: He is alert and oriented to person, place, and time.      Motor: Weakness (generaized weakness) present.   Psychiatric:         Mood and Affect: Mood normal.         Behavior: Behavior normal.               Recent Results (from the past 24 hour(s))   POCT glucose    Collection Time: 07/22/23 12:22 PM   Result Value Ref Range    POCT Glucose 103 70 - 110 mg/dL   POCT glucose    Collection Time: 07/22/23  4:43 PM   Result Value Ref Range    POCT Glucose 111 (H) 70 - 110 mg/dL   POCT glucose    Collection Time: 07/22/23  8:52 PM   Result Value Ref Range    POCT Glucose 177 (H) 70 - 110 mg/dL   POCT glucose    Collection Time: 07/23/23  7:56 AM   Result Value Ref Range    POCT Glucose 154 (H) 70 - 110 mg/dL       Microbiology Results (last 7 days)       Procedure Component Value Units Date/Time    Blood culture x two cultures. Draw prior to antibiotics. [[id number]] Collected: 07/17/23 1241    Order Status: Completed Specimen: Blood from Peripheral, Hand, Right Updated: 07/21/23 1303     Blood Culture, Routine No Growth after 4 days.    Narrative:      Aerobic and anaerobic    Blood culture x two cultures. Draw prior to antibiotics. [[id number]] Collected: 07/17/23 1239    Order Status: Completed Specimen: Blood from Peripheral, Antecubital, Left Updated: 07/21/23 1303     Blood Culture, Routine No Growth after 4 days.    Narrative:      Aerobic and anaerobic              Imaging Results              CT Head Without Contrast (Final result)  Result time 07/17/23 19:03:22      Final result by Mine Dasilva MD (07/17/23 19:03:22)                   Impression:      No acute intracranial abnormalities identified.  No significant detrimental change compared to recent CT head 06/01/2023.      Electronically signed by: Mine Dasilva MD  Date:    07/17/2023  Time:    19:03               Narrative:    EXAMINATION:  CT HEAD WITHOUT CONTRAST    CLINICAL HISTORY:  Mental status change, unknown cause;    TECHNIQUE:  Low dose axial images were obtained through the head.  Coronal and sagittal reformations were also performed. Contrast was not administered.    COMPARISON:  06/01/2023.    FINDINGS:  There is generalized cerebral volume loss with extensive chronic microvascular ischemic disease.  Small region of remote infarction with encephalomalacia is seen in the left frontal lobe.  No evidence of acute/recent major vascular distribution cerebral infarction, intraparenchymal hemorrhage, or intra-axial space occupying lesion. The ventricular system is normal in size and configuration with no evidence of hydrocephalus. No effacement of the skull-base cisterns. No abnormal extra-axial fluid collections or blood products. Visualized paranasal sinuses and mastoid air cells are clear. The calvarium shows no significant abnormality.                                       X-Ray Knee 1 or 2 View Left (Final result)  Result time 07/17/23 18:34:51      Final result by Mine Dasilva MD (07/17/23 18:34:51)                   Impression:      No acute osseous abnormality identified.  Additional findings as detailed above.  No significant change.      Electronically signed by: Mine Dasilva MD  Date:    07/17/2023  Time:    18:34               Narrative:    EXAMINATION:  XR KNEE 1 OR 2 VIEW LEFT    CLINICAL HISTORY:  swelling, pain;    TECHNIQUE:  AP and lateral views of the left knee were  performed.    COMPARISON:  07/05/2023.    FINDINGS:  No evidence of acute displaced fracture, dislocation, or osseous destructive process.  Severe joint space narrowing is seen at the medial tibiofemoral compartment.  There is significant suprapatellar joint effusion.  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  Surrounding soft tissue edema is seen.                                       CT Abdomen Pelvis  Without Contrast (Final result)  Result time 07/17/23 17:30:31      Final result by Mine Dasilva MD (07/17/23 17:30:31)                   Impression:      1. No acute intra-abdominal abnormalities identified.  2. Cholelithiasis with suspected gallbladder sludge.  3. Additional findings as detailed above.      Electronically signed by: Mine Dasilva MD  Date:    07/17/2023  Time:    17:30               Narrative:    EXAMINATION:  CT ABDOMEN PELVIS WITHOUT CONTRAST    CLINICAL HISTORY:  RLQ abdominal pain (Age >= 14y);    TECHNIQUE:  Low dose axial images, sagittal and coronal reformations were obtained from the lung bases to the pubic symphysis.  Oral contrast was not administered.    COMPARISON:  CT abdomen pelvis 06/26/2023.    FINDINGS:  Heart is mildly enlarged with coronary artery and aortic valve calcification seen.  The lung bases are clear.    Small calcified granulomas are seen within the liver and spleen.  There is no intra-or extrahepatic biliary ductal dilatation.  There is cholelithiasis with hyperdense layering material seen within the gallbladder which may represent hyperdense sludge.  Stomach, pancreas, and adrenal glands are unremarkable.    Kidneys show no evidence of stones or hydronephrosis. Ureters are unremarkable along their courses.  Urinary bladder is collapsed around a De Leon catheter.  Prostate is small in size or has been removed.    No evidence of appendicitis.  Moderate volume retained stool is seen within the colon.  The visualized loops of  small and large bowel show no evidence of obstruction or inflammation.  No free air or free fluid.    Aorta tapers normally with mild atherosclerosis.    No acute osseous abnormality identified.  Multilevel degenerative changes are seen within the spine.  Nonspecific subcutaneous soft tissue edema is seen involving the bilateral hip regions.                                       US Lower Extremity Veins Bilateral (Final result)  Result time 07/17/23 16:31:14      Final result by Elbert Nunez MD (07/17/23 16:31:14)                   Impression:      No evidence of deep venous thrombosis in either lower extremity.      Electronically signed by: Elbert Nunez  Date:    07/17/2023  Time:    16:31               Narrative:    EXAMINATION:  US LOWER EXTREMITY VEINS BILATERAL    CLINICAL HISTORY:  Other specified soft tissue disorders    TECHNIQUE:  Duplex and color flow Doppler and dynamic compression was performed of the bilateral lower extremity veins was performed.    COMPARISON:  None    FINDINGS:  Right thigh veins: The common femoral, femoral, popliteal, upper greater saphenous, and deep femoral veins are patent and free of thrombus. The veins are normally compressible and have normal phasic flow and augmentation response.    Right calf veins: The visualized calf veins are patent.    Left thigh veins: The common femoral, femoral, popliteal, upper greater saphenous, and deep femoral veins are patent and free of thrombus. The veins are normally compressible and have normal phasic flow and augmentation response.    Left calf veins: The visualized calf veins are patent.    Miscellaneous: None                                       X-Ray Chest AP Portable (Final result)  Result time 07/17/23 12:57:52      Final result by Martin Thorpe MD (07/17/23 12:57:52)                   Impression:      See above      Electronically signed by: Martin Thorpe MD  Date:    07/17/2023  Time:    12:57               Narrative:     EXAMINATION:  XR CHEST AP PORTABLE    CLINICAL HISTORY:  Sepsis;    TECHNIQUE:  Single frontal view of the chest was performed.    COMPARISON:  No 07/05/2023 ne    FINDINGS:  Heart size is normal..  Lungs are clear.  No pleural effusion                                            Assessment/Plan:      * Sepsis  This patient does have evidence of infective focus  My overall impression is sepsis.  Source: left knee vs superimposed cellulitis (shingles).  Suspect source is cellulitis at this time.  Antibiotics given-   Antibiotics (72h ago, onward)    Start     Stop Route Frequency Ordered    07/22/23 0430  piperacillin-tazobactam (ZOSYN) 4.5 g in dextrose 5 % in water (D5W) 5 % 100 mL IVPB (MB+)         -- IV Every 8 hours (non-standard times) 07/21/23 2244    07/19/23 0900  mupirocin 2 % ointment         07/24 0859 Nasl 2 times daily 07/19/23 0656        Latest lactate reviewed-  No results for input(s): LACTATE in the last 72 hours.  Organ dysfunction indicated by Acute kidney injury and Encephalopathy    Fluid challenge given 2 liters NS in ED - not hypotensive    Post- resuscitation assessment Yes Perfusion exam was performed within 6 hours of septic shock presentation after bolus shows Adequate tissue perfusion assessed by non-invasive monitoring       Will Not start Pressors  Source control achieved by: IV antibiotics    -Presented febrile, tachypneic and with leukocytosis  -Also have ELIZABETH on admission  -recently diagnosed with shingles, but patient has not started his prescription of the valacyclovir outpatient  -blood culture with no growth to date.    -continue valacyclovir for shingles and Zosyn for superimposed cellulitis  -Leukocytosis resolved 07/19    Skin rash  -Diagnosed with shingles and prescribed valacyclovir outpatient   -He states that this was picked up but did not start treatment    -Continue renal dose valacyclovir as well as IV antibiotics for superimposed bacterial infection and source of  sepsis.  Precautions noted.  -Wound care consult    Multimodal Pain Control:   · Increased frequency of norco to q4h  · Low dose breakthrough 0.2 mg Dilaudid  · Scheduled Tylenol 1000 mg TID  · Low dose lindsay 100 mg BID    Falls  -PT/OT consulted: recommends SNF  -Medically clear for discharge to SNF. Awaiting placement     Acute encephalopathy  Patient appears encephalopathic and different from his usual baseline on admission.  His BUN is 114 so uremic encephalopathy is on differential as well as sepsis.  Suspect these are etiology but will CT head for completeness.  - Ct head: No acute intracranial abnormalities identified.  No significant detrimental change compared to recent CT head 06/01/2023.   - AOx4, mental status normal and at baseline on 07/18  -Resolved      Elevated troponin  -Presents with sepsis and found to have elevated troponin of 0.183.   -Denies chest pain. ECG reviewed and read as unusual P axis. Has known LBBB.   -Recent echo with normal EF and grade II diastolic dysfunction.   -Suspect this is demand in setting of sepsis.   -trend troponin, downtrend      DJD (degenerative joint disease)  -Chronic issues, particularly left knee.  -XR of left knee: No evidence of acute displaced fracture, dislocation, or osseous destructive process.  Severe joint space narrowing is seen at the medial tibiofemoral compartment.  There is significant suprapatellar joint effusion      Class 1 obesity due to excess calories with serious comorbidity and body mass index (BMI) of 30.0 to 30.9 in adult  Body mass index is 30.5 kg/m². Morbid obesity complicates all aspects of disease management from diagnostic modalities to treatment. Weight loss encouraged and health benefits explained to patient.         Anasarca  This is chronic though patient appears volume depleted in the setting of sepsis.    Continue to monitor.      Type 2 diabetes mellitus with hyperglycemia  Patient's FSGs are controlled on current medication  regimen.  Last A1c reviewed-   Lab Results   Component Value Date    HGBA1C 7.8 (H) 06/02/2023     Most recent fingerstick glucose reviewed-   Recent Labs   Lab 07/21/23  1605 07/21/23 2018 07/22/23  0817 07/22/23  1222   POCTGLUCOSE 148* 121* 90 103     Current correctional scale  Low  Maintain anti-hyperglycemic dose as follows-   Antihyperglycemics (From admission, onward)    Start     Stop Route Frequency Ordered    07/17/23 1738  insulin aspart U-100 pen 0-5 Units         -- SubQ Before meals & nightly PRN 07/17/23 1641        Hold Oral hypoglycemics while patient is in the hospital.    BPH (benign prostatic hyperplasia)  De Leon in place, resume Flomax  Voiding trial on 07/20      Left knee pain  -This is chronic, left knee appears swollen but did not appear erythematous or warm.    -XR left knee: No evidence of acute displaced fracture, dislocation, or osseous destructive process.  Severe joint space narrowing is seen at the medial tibiofemoral compartment.  There is significant suprapatellar joint effusion      Hypokalemia  -Replace as needed      Essential hypertension  -blood pressure elevated, patient is stable   -resume home hydralazine, metoprolol succinate, and amiodarone  -Increased home hydralazine to 100mg TID on 07/20  -BP still elevated, add norvasc 10mg on 07/22  -IV hydralazine PRN for SBP>180    Acute kidney injury superimposed on chronic kidney disease  -Presents with  and creatinine of 5.4 with baseline of 2.5-2.8.    -BNP 99 when usually patient has BNP greater than 300.    -Likely volume depletion in the setting of sepsis and poor p.o. intake.    -He was given 2 L normal saline in the emergency department.  Currently with good urine output.  -De Leon catheter in place.  -Renal US normal  -Continue to monitor, if no improvement plans for Nephrology consult  -Cr improving, at baseline       VTE Risk Mitigation (From admission, onward)         Ordered     apixaban tablet 5 mg  2 times  daily         07/17/23 1641     IP VTE HIGH RISK PATIENT  Once         07/17/23 1641     Place sequential compression device  Until discontinued         07/17/23 1641                Discharge Planning   ELY: 7/21/2023     Code Status: Full Code   Is the patient medically ready for discharge?: Yes    Reason for patient still in hospital (select all that apply): Patient trending condition and Treatment  Discharge Plan A: Skilled Nursing Facility   Discharge Delays: (!) Post-Acute Set-up              Abhijeet Veloz MD  Department of Hospital Medicine   [facility]

## 2023-07-23 NOTE — SUBJECTIVE & OBJECTIVE
Interval History:   NAEON.  Patient appears uncomfortable   States he couldn't sleep due to too much pain 2/2 rash  All questions answered and updates on care given.       Review of Systems   Musculoskeletal:  Positive for back pain and myalgias.   Skin:  Positive for color change and rash.     Objective:     Vital Signs (Most Recent):  Temp: 98.5 °F (36.9 °C) (07/23/23 0756)  Pulse: 73 (07/23/23 0756)  Resp: 20 (07/23/23 0804)  BP: (!) 145/67 (07/23/23 0756)  SpO2: 96 % (07/23/23 0756) Vital Signs (24h Range):  Temp:  [97.8 °F (36.6 °C)-98.7 °F (37.1 °C)] 98.5 °F (36.9 °C)  Pulse:  [73-79] 73  Resp:  [18-20] 20  SpO2:  [96 %-98 %] 96 %  BP: (138-194)/(66-82) 145/67     Weight: 99.2 kg (218 lb 11.2 oz)  Body mass index is 30.5 kg/m².    Intake/Output Summary (Last 24 hours) at 7/23/2023 0841  Last data filed at 7/23/2023 0226  Gross per 24 hour   Intake 240 ml   Output 1200 ml   Net -960 ml           Physical Exam  Vitals and nursing note reviewed.   Constitutional:       General: He is not in acute distress.     Appearance: He is obese. He is ill-appearing (chronically).   HENT:      Mouth/Throat:      Mouth: Mucous membranes are moist.   Eyes:      Extraocular Movements: Extraocular movements intact.   Cardiovascular:      Rate and Rhythm: Normal rate and regular rhythm.      Heart sounds: No murmur heard.    No gallop.   Pulmonary:      Effort: Pulmonary effort is normal. No respiratory distress.      Breath sounds: No wheezing.   Abdominal:      General: Bowel sounds are normal. There is no distension.      Tenderness: There is no abdominal tenderness. There is no guarding.   Musculoskeletal:         General: Swelling present.   Skin:     General: Skin is warm.      Findings: Rash present.      Comments: Noted lesion to right heel, right lateral calf as well as skin lesions to the right flank.  Refer to media images   Neurological:      Mental Status: He is alert and oriented to person, place, and time.       Motor: Weakness (generaized weakness) present.   Psychiatric:         Mood and Affect: Mood normal.         Behavior: Behavior normal.               Recent Results (from the past 24 hour(s))   POCT glucose    Collection Time: 07/22/23 12:22 PM   Result Value Ref Range    POCT Glucose 103 70 - 110 mg/dL   POCT glucose    Collection Time: 07/22/23  4:43 PM   Result Value Ref Range    POCT Glucose 111 (H) 70 - 110 mg/dL   POCT glucose    Collection Time: 07/22/23  8:52 PM   Result Value Ref Range    POCT Glucose 177 (H) 70 - 110 mg/dL   POCT glucose    Collection Time: 07/23/23  7:56 AM   Result Value Ref Range    POCT Glucose 154 (H) 70 - 110 mg/dL       Microbiology Results (last 7 days)       Procedure Component Value Units Date/Time    Blood culture x two cultures. Draw prior to antibiotics. [[id number]] Collected: 07/17/23 1241    Order Status: Completed Specimen: Blood from Peripheral, Hand, Right Updated: 07/21/23 1303     Blood Culture, Routine No Growth after 4 days.    Narrative:      Aerobic and anaerobic    Blood culture x two cultures. Draw prior to antibiotics. [[id number]] Collected: 07/17/23 1239    Order Status: Completed Specimen: Blood from Peripheral, Antecubital, Left Updated: 07/21/23 1303     Blood Culture, Routine No Growth after 4 days.    Narrative:      Aerobic and anaerobic             Imaging Results              CT Head Without Contrast (Final result)  Result time 07/17/23 19:03:22      Final result by Mine Dasilva MD (07/17/23 19:03:22)                   Impression:      No acute intracranial abnormalities identified.  No significant detrimental change compared to recent CT head 06/01/2023.      Electronically signed by: Mine Dasilva MD  Date:    07/17/2023  Time:    19:03               Narrative:    EXAMINATION:  CT HEAD WITHOUT CONTRAST    CLINICAL HISTORY:  Mental status change, unknown cause;    TECHNIQUE:  Low dose axial images were obtained through the head.  Coronal and  sagittal reformations were also performed. Contrast was not administered.    COMPARISON:  06/01/2023.    FINDINGS:  There is generalized cerebral volume loss with extensive chronic microvascular ischemic disease.  Small region of remote infarction with encephalomalacia is seen in the left frontal lobe.  No evidence of acute/recent major vascular distribution cerebral infarction, intraparenchymal hemorrhage, or intra-axial space occupying lesion. The ventricular system is normal in size and configuration with no evidence of hydrocephalus. No effacement of the skull-base cisterns. No abnormal extra-axial fluid collections or blood products. Visualized paranasal sinuses and mastoid air cells are clear. The calvarium shows no significant abnormality.                                       X-Ray Knee 1 or 2 View Left (Final result)  Result time 07/17/23 18:34:51      Final result by Mine Dasilva MD (07/17/23 18:34:51)                   Impression:      No acute osseous abnormality identified.  Additional findings as detailed above.  No significant change.      Electronically signed by: Mine Dasilva MD  Date:    07/17/2023  Time:    18:34               Narrative:    EXAMINATION:  XR KNEE 1 OR 2 VIEW LEFT    CLINICAL HISTORY:  swelling, pain;    TECHNIQUE:  AP and lateral views of the left knee were performed.    COMPARISON:  07/05/2023.    FINDINGS:  No evidence of acute displaced fracture, dislocation, or osseous destructive process.  Severe joint space narrowing is seen at the medial tibiofemoral compartment.  There is significant suprapatellar joint effusion.  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  Surrounding soft tissue edema is seen.                                       CT Abdomen Pelvis  Without Contrast (Final result)  Result time 07/17/23 17:30:31      Final result by Mine Dasilva MD (07/17/23 17:30:31)                   Impression:      1. No acute  intra-abdominal abnormalities identified.  2. Cholelithiasis with suspected gallbladder sludge.  3. Additional findings as detailed above.      Electronically signed by: Mine Dasilva MD  Date:    07/17/2023  Time:    17:30               Narrative:    EXAMINATION:  CT ABDOMEN PELVIS WITHOUT CONTRAST    CLINICAL HISTORY:  RLQ abdominal pain (Age >= 14y);    TECHNIQUE:  Low dose axial images, sagittal and coronal reformations were obtained from the lung bases to the pubic symphysis.  Oral contrast was not administered.    COMPARISON:  CT abdomen pelvis 06/26/2023.    FINDINGS:  Heart is mildly enlarged with coronary artery and aortic valve calcification seen.  The lung bases are clear.    Small calcified granulomas are seen within the liver and spleen.  There is no intra-or extrahepatic biliary ductal dilatation.  There is cholelithiasis with hyperdense layering material seen within the gallbladder which may represent hyperdense sludge.  Stomach, pancreas, and adrenal glands are unremarkable.    Kidneys show no evidence of stones or hydronephrosis. Ureters are unremarkable along their courses.  Urinary bladder is collapsed around a De Leon catheter.  Prostate is small in size or has been removed.    No evidence of appendicitis.  Moderate volume retained stool is seen within the colon.  The visualized loops of small and large bowel show no evidence of obstruction or inflammation.  No free air or free fluid.    Aorta tapers normally with mild atherosclerosis.    No acute osseous abnormality identified.  Multilevel degenerative changes are seen within the spine.  Nonspecific subcutaneous soft tissue edema is seen involving the bilateral hip regions.                                       US Lower Extremity Veins Bilateral (Final result)  Result time 07/17/23 16:31:14      Final result by Elbert Nunez MD (07/17/23 16:31:14)                   Impression:      No evidence of deep venous thrombosis in either lower  extremity.      Electronically signed by: Elbert Martinez  Date:    07/17/2023  Time:    16:31               Narrative:    EXAMINATION:  US LOWER EXTREMITY VEINS BILATERAL    CLINICAL HISTORY:  Other specified soft tissue disorders    TECHNIQUE:  Duplex and color flow Doppler and dynamic compression was performed of the bilateral lower extremity veins was performed.    COMPARISON:  None    FINDINGS:  Right thigh veins: The common femoral, femoral, popliteal, upper greater saphenous, and deep femoral veins are patent and free of thrombus. The veins are normally compressible and have normal phasic flow and augmentation response.    Right calf veins: The visualized calf veins are patent.    Left thigh veins: The common femoral, femoral, popliteal, upper greater saphenous, and deep femoral veins are patent and free of thrombus. The veins are normally compressible and have normal phasic flow and augmentation response.    Left calf veins: The visualized calf veins are patent.    Miscellaneous: None                                       X-Ray Chest AP Portable (Final result)  Result time 07/17/23 12:57:52      Final result by Martin Thorpe MD (07/17/23 12:57:52)                   Impression:      See above      Electronically signed by: Martin Thorpe MD  Date:    07/17/2023  Time:    12:57               Narrative:    EXAMINATION:  XR CHEST AP PORTABLE    CLINICAL HISTORY:  Sepsis;    TECHNIQUE:  Single frontal view of the chest was performed.    COMPARISON:  No 07/05/2023 ne    FINDINGS:  Heart size is normal..  Lungs are clear.  No pleural effusion

## 2023-07-23 NOTE — ASSESSMENT & PLAN NOTE
-Diagnosed with shingles and prescribed valacyclovir outpatient   -He states that this was picked up but did not start treatment    -Continue renal dose valacyclovir as well as IV antibiotics for superimposed bacterial infection and source of sepsis.  Precautions noted.  -Wound care consult    Multimodal Pain Control:   · Increased frequency of norco to q4h  · Low dose breakthrough 0.2 mg Dilaudid  · Scheduled Tylenol 1000 mg TID  · Low dose lindsay 100 mg BID

## 2023-07-23 NOTE — NURSING
[facility] - [place], [place]  Nurses Note -- 4 Eyes      7/23/2023       Skin assessed on: Q Shift      [x] No Pressure Injuries Present    [x]Prevention Measures Documented    [] Yes LDA  for Pressure Injury Previously documented     [] Yes New Pressure Injury Discovered   [] LDA for New Pressure Injury Added      Attending RN:  Rasheeda Nguyen, RN     Second RN:  nurse jose Canales

## 2023-07-23 NOTE — NURSING
pt is exhibiting episodes of confusion that is new, although he was able to answer all questions asked he continues to call on call light and request things that do not make sense, this is for sure new for this pt I have been w/him the last 3 nights, MD sent msg       [id number] spoke w/MD about pt's confusion, 0 new orders given, will continue to monitor

## 2023-07-23 NOTE — PLAN OF CARE
Problem: Adult Inpatient Plan of Care  Goal: Plan of Care Review  Outcome: Ongoing, Progressing  Goal: Patient-Specific Goal (Individualized)  Outcome: Ongoing, Progressing  Goal: Absence of Hospital-Acquired Illness or Injury  Outcome: Ongoing, Progressing  Goal: Optimal Comfort and Wellbeing  Outcome: Ongoing, Progressing  Goal: Readiness for Transition of Care  Outcome: Ongoing, Progressing     Problem: Skin Injury Risk Increased  Goal: Skin Health and Integrity  Outcome: Ongoing, Progressing     Problem: Infection  Goal: Absence of Infection Signs and Symptoms  Outcome: Ongoing, Progressing     Problem: Diabetes Comorbidity  Goal: Blood Glucose Level Within Targeted Range  Outcome: Ongoing, Progressing     Problem: Adjustment to Illness (Sepsis/Septic Shock)  Goal: Optimal Coping  Outcome: Ongoing, Progressing     Problem: Bleeding (Sepsis/Septic Shock)  Goal: Absence of Bleeding  Outcome: Ongoing, Progressing     Problem: Glycemic Control Impaired (Sepsis/Septic Shock)  Goal: Blood Glucose Level Within Desired Range  Outcome: Ongoing, Progressing     Problem: Infection Progression (Sepsis/Septic Shock)  Goal: Absence of Infection Signs and Symptoms  Outcome: Ongoing, Progressing     Problem: Nutrition Impaired (Sepsis/Septic Shock)  Goal: Optimal Nutrition Intake  Outcome: Ongoing, Progressing     Problem: Fluid and Electrolyte Imbalance (Acute Kidney Injury/Impairment)  Goal: Fluid and Electrolyte Balance  Outcome: Ongoing, Progressing     Problem: Oral Intake Inadequate (Acute Kidney Injury/Impairment)  Goal: Optimal Nutrition Intake  Outcome: Ongoing, Progressing     Problem: Renal Function Impairment (Acute Kidney Injury/Impairment)  Goal: Effective Renal Function  Outcome: Ongoing, Progressing     Problem: Impaired Wound Healing  Goal: Optimal Wound Healing  Outcome: Ongoing, Progressing     Problem: Fall Injury Risk  Goal: Absence of Fall and Fall-Related Injury  Outcome: Ongoing, Progressing

## 2023-07-24 LAB
POCT GLUCOSE: 108 MG/DL (ref 70–110)
POCT GLUCOSE: 126 MG/DL (ref 70–110)
POCT GLUCOSE: 142 MG/DL (ref 70–110)
POCT GLUCOSE: 181 MG/DL (ref 70–110)
TROPONIN I SERPL DL<=0.01 NG/ML-MCNC: 0.03 NG/ML (ref 0–0.03)
TROPONIN I SERPL DL<=0.01 NG/ML-MCNC: 0.04 NG/ML (ref 0–0.03)

## 2023-07-24 PROCEDURE — 63600175 PHARM REV CODE 636 W HCPCS: Performed by: STUDENT IN AN ORGANIZED HEALTH CARE EDUCATION/TRAINING PROGRAM

## 2023-07-24 PROCEDURE — 97116 GAIT TRAINING THERAPY: CPT

## 2023-07-24 PROCEDURE — 11000001 HC ACUTE MED/SURG PRIVATE ROOM

## 2023-07-24 PROCEDURE — 25000003 PHARM REV CODE 250: Performed by: STUDENT IN AN ORGANIZED HEALTH CARE EDUCATION/TRAINING PROGRAM

## 2023-07-24 PROCEDURE — 84484 ASSAY OF TROPONIN QUANT: CPT | Mod: 91 | Performed by: STUDENT IN AN ORGANIZED HEALTH CARE EDUCATION/TRAINING PROGRAM

## 2023-07-24 PROCEDURE — 36415 COLL VENOUS BLD VENIPUNCTURE: CPT | Performed by: STUDENT IN AN ORGANIZED HEALTH CARE EDUCATION/TRAINING PROGRAM

## 2023-07-24 PROCEDURE — 97535 SELF CARE MNGMENT TRAINING: CPT

## 2023-07-24 PROCEDURE — 97110 THERAPEUTIC EXERCISES: CPT

## 2023-07-24 PROCEDURE — 99900035 HC TECH TIME PER 15 MIN (STAT)

## 2023-07-24 PROCEDURE — 27000207 HC ISOLATION

## 2023-07-24 PROCEDURE — 97530 THERAPEUTIC ACTIVITIES: CPT

## 2023-07-24 RX ORDER — VALACYCLOVIR HYDROCHLORIDE 500 MG/1
1000 TABLET, FILM COATED ORAL DAILY
Status: COMPLETED | OUTPATIENT
Start: 2023-07-24 | End: 2023-07-24

## 2023-07-24 RX ADMIN — HYDROCODONE BITARTRATE AND ACETAMINOPHEN 1 TABLET: 10; 325 TABLET ORAL at 04:07

## 2023-07-24 RX ADMIN — GABAPENTIN 100 MG: 100 CAPSULE ORAL at 09:07

## 2023-07-24 RX ADMIN — GABAPENTIN 100 MG: 100 CAPSULE ORAL at 08:07

## 2023-07-24 RX ADMIN — ACETAMINOPHEN 1000 MG: 500 TABLET, FILM COATED ORAL at 01:07

## 2023-07-24 RX ADMIN — METOPROLOL SUCCINATE 25 MG: 25 TABLET, EXTENDED RELEASE ORAL at 09:07

## 2023-07-24 RX ADMIN — PIPERACILLIN AND TAZOBACTAM 4.5 G: 4; .5 INJECTION, POWDER, LYOPHILIZED, FOR SOLUTION INTRAVENOUS; PARENTERAL at 12:07

## 2023-07-24 RX ADMIN — HYDRALAZINE HYDROCHLORIDE 100 MG: 25 TABLET, FILM COATED ORAL at 09:07

## 2023-07-24 RX ADMIN — HYDROCODONE BITARTRATE AND ACETAMINOPHEN 1 TABLET: 10; 325 TABLET ORAL at 11:07

## 2023-07-24 RX ADMIN — AMLODIPINE BESYLATE 10 MG: 5 TABLET ORAL at 09:07

## 2023-07-24 RX ADMIN — ACETAMINOPHEN 1000 MG: 500 TABLET, FILM COATED ORAL at 04:07

## 2023-07-24 RX ADMIN — APIXABAN 5 MG: 5 TABLET, FILM COATED ORAL at 09:07

## 2023-07-24 RX ADMIN — AMIODARONE HYDROCHLORIDE 200 MG: 200 TABLET ORAL at 09:07

## 2023-07-24 RX ADMIN — APIXABAN 5 MG: 5 TABLET, FILM COATED ORAL at 08:07

## 2023-07-24 RX ADMIN — VALACYCLOVIR HYDROCHLORIDE 1000 MG: 500 TABLET, FILM COATED ORAL at 09:07

## 2023-07-24 RX ADMIN — PIPERACILLIN AND TAZOBACTAM 4.5 G: 4; .5 INJECTION, POWDER, LYOPHILIZED, FOR SOLUTION INTRAVENOUS; PARENTERAL at 08:07

## 2023-07-24 RX ADMIN — HYDRALAZINE HYDROCHLORIDE 100 MG: 25 TABLET, FILM COATED ORAL at 06:07

## 2023-07-24 RX ADMIN — PIPERACILLIN AND TAZOBACTAM 4.5 G: 4; .5 INJECTION, POWDER, LYOPHILIZED, FOR SOLUTION INTRAVENOUS; PARENTERAL at 04:07

## 2023-07-24 RX ADMIN — TAMSULOSIN HYDROCHLORIDE 0.4 MG: 0.4 CAPSULE ORAL at 09:07

## 2023-07-24 RX ADMIN — FERROUS SULFATE TAB 325 MG (65 MG ELEMENTAL FE) 1 EACH: 325 (65 FE) TAB at 09:07

## 2023-07-24 NOTE — PROGRESS NOTES
[facility] Medicine  Progress Note    Patient Name: Chato Bowie  MRN: [id number]  Patient Class: IP- Inpatient   Admission Date: 7/17/2023  Length of Stay: 7 days  Attending Physician: Tiffanie Rojas DO  Primary Care Provider: Irlanda Valenzuela        Subjective:     Principal Problem:Sepsis        HPI:  Mr. Bowie is a 72-year-old with extensive past medical history including CHF, arthritis, coronary artery disease, diabetes mellitus type 2, hypertension and hyperlipidemia who presents to the emergency department for evaluation of pain and swelling.  Patient was recently diagnosed with shingles and sent home with foul acyclovir.  Patient was found at home unable to get up from his chair and sitting in urine and feces.  Patient is not able to give much history in the emergency department so most of this history was obtained from medical review and providers in the emergency department.    In the emergency department, patient was found to be febrile of 100.8 with initial blood pressure 125/68 saturating 98% on room air.  Lab work revealed a leukocytosis of 17.4, acute on chronic kidney disease with creatinine 5.4  with elevated procalcitonin.  Patient underwent ultrasound of lower extremities which did not reveal DVT.  CT abdomen and pelvis did not show any acute intra-abdominal abnormalities but does note soft tissue edema in the bilateral hip regions.  Patient also found to have concerns for infection of right flank and hip wounds.  Patient admitted to hospital medicine for further management.        Overview/Hospital Course:   72-year-old with extensive past medical history including CHF, arthritis, coronary artery disease, diabetes mellitus type 2, hypertension and hyperlipidemia admitted on 7/17/2023 for sepsis likely secondary to shingles and overlying cellulitis and acute kidney injury. Stated on IV antibiotics and continued on valacyclovir for shingles treatment. Wound care  consulted. Also found to have elevated troponin and hypokalemia.  Electrolytes replaced as needed.  Suspect troponin elevation due to demand in the setting of sepsis and ELIZABETH.  ECG reviewed and read as unusual P axis. Has known LBBB. Recent echo with normal EF and grade II diastolic dysfunction.  Troponin trended down.  Patient without any chest pain or shortness of breath. ELIZABETH improving and leukocytosis resolved. PT/OT consulted-recommends SNF. Patient unable to care for himself, will likely need to transition to alf NH. Patient medically clear and stable for discharge to SNF. Awaiting placement.       Interval History:  No acute overnight events.  Patient remained afebrile overnight.  Pain regimen changed yesterday with better pain controlled.  Clinically appears better this morning. Complains of generalized weakness. Alert oriented x4.    Review of Systems   Constitutional:  Negative for chills, fatigue and fever.   Respiratory:  Negative for cough and shortness of breath.    Cardiovascular:  Positive for leg swelling (chronic, history of anarsarca). Negative for chest pain and palpitations.   Gastrointestinal:  Negative for abdominal pain, diarrhea, nausea and vomiting.   Genitourinary:  Negative for difficulty urinating, dysuria, frequency and hematuria.   Musculoskeletal:  Positive for gait problem and myalgias. Negative for joint swelling.   Skin:  Positive for rash.   Neurological:  Positive for weakness. Negative for dizziness and headaches.     Objective:     Vital Signs (Most Recent):  Temp: 100.3 °F (37.9 °C) (07/24/23 1140)  Pulse: 79 (07/24/23 1140)  Resp: 20 (07/24/23 1140)  BP: (!) 157/72 (07/24/23 1140)  SpO2: (!) 94 % (07/24/23 1140) Vital Signs (24h Range):  Temp:  [98.2 °F (36.8 °C)-100.3 °F (37.9 °C)] 100.3 °F (37.9 °C)  Pulse:  [67-79] 79  Resp:  [16-20] 20  SpO2:  [94 %-98 %] 94 %  BP: (125-157)/(61-72) 157/72     Weight: 99.2 kg (218 lb 11.2 oz)  Body mass index is 30.5  kg/m².    Intake/Output Summary (Last 24 hours) at 7/24/2023 1240  Last data filed at 7/24/2023 0844  Gross per 24 hour   Intake 739.58 ml   Output 350 ml   Net 389.58 ml           Physical Exam  Vitals and nursing note reviewed.   Constitutional:       General: He is not in acute distress.     Appearance: He is obese. He is ill-appearing (chronically).   HENT:      Mouth/Throat:      Mouth: Mucous membranes are moist.   Eyes:      Extraocular Movements: Extraocular movements intact.   Cardiovascular:      Rate and Rhythm: Normal rate and regular rhythm.      Heart sounds: No murmur heard.    No gallop.   Pulmonary:      Effort: Pulmonary effort is normal. No respiratory distress.      Breath sounds: No wheezing.   Abdominal:      General: Bowel sounds are normal. There is no distension.      Tenderness: There is no abdominal tenderness. There is no guarding.   Musculoskeletal:         General: Swelling present.   Skin:     General: Skin is warm.      Findings: Rash present.      Comments: Noted lesion to right heel, right lateral calf as well as skin lesions to the right flank.  Refer to media images   Neurological:      Mental Status: He is alert and oriented to person, place, and time.      Motor: Weakness (generaized weakness) present.   Psychiatric:         Mood and Affect: Mood normal.         Behavior: Behavior normal.           Significant Labs: All pertinent labs within the past 24 hours have been reviewed.    Significant Imaging: I have reviewed all pertinent imaging results/findings within the past 24 hours.      Assessment/Plan:      * Sepsis  This patient does have evidence of infective focus  My overall impression is sepsis.  Source: left knee vs superimposed cellulitis (shingles).  Suspect source is cellulitis at this time.  Antibiotics given-   Antibiotics (72h ago, onward)    Start     Stop Route Frequency Ordered    07/22/23 0430  piperacillin-tazobactam (ZOSYN) 4.5 g in dextrose 5 % in water (D5W)  5 % 100 mL IVPB (MB+)         -- IV Every 8 hours (non-standard times) 07/21/23 2244    07/19/23 0900  mupirocin 2 % ointment         07/24 0859 Nasl 2 times daily 07/19/23 0656        Latest lactate reviewed-  No results for input(s): LACTATE in the last 72 hours.  Organ dysfunction indicated by Acute kidney injury and Encephalopathy    Fluid challenge given 2 liters NS in ED - not hypotensive    Post- resuscitation assessment Yes Perfusion exam was performed within 6 hours of septic shock presentation after bolus shows Adequate tissue perfusion assessed by non-invasive monitoring       Will Not start Pressors  Source control achieved by: IV antibiotics    -Presented febrile, tachypneic and with leukocytosis  -Also have ELIZABETH on admission  -recently diagnosed with shingles, but patient has not started his prescription of the valacyclovir outpatient  -blood culture with no growth to date.    -continue valacyclovir for shingles and Zosyn for superimposed cellulitis. Treatment course should be completed on 07/24  -Leukocytosis resolved 07/19    Acute kidney injury superimposed on chronic kidney disease  -Presents with  and creatinine of 5.4 with baseline of 2.5-2.8.    -BNP 99 when usually patient has BNP greater than 300.    -Likely volume depletion in the setting of sepsis and poor p.o. intake.    -He was given 2 L normal saline in the emergency department.  Currently with good urine output.    -Renal US normal  -Continue to monitor, if no improvement plans for Nephrology consult  -Cr improved, at baseline     Skin rash  -Diagnosed with shingles and prescribed valacyclovir outpatient   -He states that this was picked up but did not start treatment    -Continue renal dose valacyclovir as well as IV antibiotics for superimposed bacterial infection and source of sepsis.  Precautions noted.  -Wound care consult    Multimodal Pain Control:   · Increased frequency of norco to q4h on 07/23  · Low dose breakthrough 0.2  mg Dilaudid  · Scheduled Tylenol 1000 mg TID  · Low dose lindsay 100 mg BID    Hypokalemia  -Replace as needed  -Resolved on 07/22      Essential hypertension  -blood pressure elevated, patient is stable   -resume home hydralazine, metoprolol succinate, and amiodarone  -Increased home hydralazine to 100mg TID on 07/20  -BP still elevated, add norvasc 10mg on 07/22  -IV hydralazine PRN for SBP>180    Acute encephalopathy  Patient appears encephalopathic and different from his usual baseline on admission.  His BUN is 114 so uremic encephalopathy is on differential as well as sepsis.  Suspect these are etiology but will CT head for completeness.  - Ct head: No acute intracranial abnormalities identified.  No significant detrimental change compared to recent CT head 06/01/2023.   - AOx4, mental status normal and at baseline on 07/18  -Resolved      Elevated troponin  -Presents with sepsis and found to have elevated troponin of 0.183.   -Denies chest pain. ECG reviewed and read as unusual P axis. Has known LBBB.   -Recent echo with normal EF and grade II diastolic dysfunction.   -Suspect this is demand in setting of sepsis.   -trend troponin, downtrend      Type 2 diabetes mellitus with hyperglycemia  Patient's FSGs are controlled on current medication regimen.  Last A1c reviewed-   Lab Results   Component Value Date    HGBA1C 7.8 (H) 06/02/2023     Most recent fingerstick glucose reviewed-   Recent Labs   Lab 07/23/23  1630 07/23/23  2048 07/24/23  0801 07/24/23  1139   POCTGLUCOSE 189* 213* 108 126*     Current correctional scale  Low  Maintain anti-hyperglycemic dose as follows-   Antihyperglycemics (From admission, onward)    Start     Stop Route Frequency Ordered    07/17/23 1738  insulin aspart U-100 pen 0-5 Units         -- SubQ Before meals & nightly PRN 07/17/23 1641        Hold Oral hypoglycemics while patient is in the hospital.    Anasarca  This is chronic though patient appears volume depleted in the setting of  sepsis.    Continue to monitor.      Left knee pain  -This is chronic, left knee appears swollen but did not appear erythematous or warm.    -XR left knee: No evidence of acute displaced fracture, dislocation, or osseous destructive process.  Severe joint space narrowing is seen at the medial tibiofemoral compartment.  There is significant suprapatellar joint effusion      DJD (degenerative joint disease)  -Chronic issues, particularly left knee.  -XR of left knee: No evidence of acute displaced fracture, dislocation, or osseous destructive process.  Severe joint space narrowing is seen at the medial tibiofemoral compartment.  There is significant suprapatellar joint effusion      BPH (benign prostatic hyperplasia)  [place] in place, resume Flomax  Voiding trial on 07/20      Falls  -PT/OT consulted: recommends SNF  -Medically clear for discharge to SNF. Awaiting placement     Class 1 obesity due to excess calories with serious comorbidity and body mass index (BMI) of 30.0 to 30.9 in adult  Body mass index is 30.5 kg/m². Morbid obesity complicates all aspects of disease management from diagnostic modalities to treatment. Weight loss encouraged and health benefits explained to patient.           VTE Risk Mitigation (From admission, onward)         Ordered     apixaban tablet 5 mg  2 times daily         07/17/23 1641     IP VTE HIGH RISK PATIENT  Once         07/17/23 1641     Place sequential compression device  Until discontinued         07/17/23 1641                Discharge Planning   ELY: 7/21/2023     Code Status: Full Code   Is the patient medically ready for discharge?: Yes    Reason for patient still in hospital (select all that apply): Patient trending condition, Treatment, PT / OT recommendations and Pending disposition  Discharge Plan A: Skilled Nursing Facility   Discharge Delays: (!) Post-Acute Set-up              Tiffanie Rojas DO  Department of Hospital Medicine   [facility]

## 2023-07-24 NOTE — PLAN OF CARE
Problem: Physical Therapy  Goal: Physical Therapy Goal  Description: Goals to be met by: 23     Patient will increase functional independence with mobility by performin. Supine to sit with Modified Edwards  2. Rolling to Left and Right with Modified Edwards.  3. Sit to stand transfer with Modified Edwards  4. Bed to chair transfer with Modified Edwards    5. Gait  x 5-10 feet with Modified Edwards using Rolling Walker.   6. Wheelchair propulsion x50-100 feet with Modified Edwards   7. Lower extremity exercise program x10 reps per handout, with independence    Outcome: Ongoing, Progressing

## 2023-07-24 NOTE — ASSESSMENT & PLAN NOTE
-Diagnosed with shingles and prescribed valacyclovir outpatient   -He states that this was picked up but did not start treatment    -Continue renal dose valacyclovir as well as IV antibiotics for superimposed bacterial infection and source of sepsis.  Precautions noted.  -Wound care consult    Multimodal Pain Control:   · Increased frequency of norco to q4h on 07/23  · Low dose breakthrough 0.2 mg Dilaudid  · Scheduled Tylenol 1000 mg TID  · Low dose lindsay 100 mg BID

## 2023-07-24 NOTE — ASSESSMENT & PLAN NOTE
-Presents with  and creatinine of 5.4 with baseline of 2.5-2.8.    -BNP 99 when usually patient has BNP greater than 300.    -Likely volume depletion in the setting of sepsis and poor p.o. intake.    -He was given 2 L normal saline in the emergency department.  Currently with good urine output.    -Renal US normal  -Continue to monitor, if no improvement plans for Nephrology consult  -Cr improved, at baseline

## 2023-07-24 NOTE — NURSING
[facility] [place]  Nurses Note -- 4 Eyes      7/23/2023       Skin assessed on: Q Shift      [] No Pressure Injuries Present    []Prevention Measures Documented    [x] Yes LDA  for Pressure Injury Previously documented     [] Yes New Pressure Injury Discovered   [] LDA for New Pressure Injury Added      Attending RN:  Ethel Hernandez LPN     Second RN:  Rasheeda Nguyen RN

## 2023-07-24 NOTE — PLAN OF CARE
Problem: Occupational Therapy  Goal: Occupational Therapy Goal  Description: Goals to be met by: 8/1/23     Patient will increase functional independence with ADLs by performing:    Feeding with Grant.  UE Dressing with Modified Grant.  LE Dressing with Modified Grant and Supervision.  Grooming while standing at sink with Contact Guard Assistance and Assistive Devices as needed.  Toileting from bedside commode with Set-up Assistance and Supervision for hygiene and clothing management.   Rolling to Bilateral with Modified Grant.   Supine to sit with Modified Grant and Supervision.  Step transfer with Contact Guard Assistance  Toilet transfer to bedside commode with Contact Guard Assistance.  Upper extremity exercise program x15 reps per handout, with assistance as needed.    Outcome: Ongoing, Progressing   The patient demo increased participation in OT today. The patient remain appropriate for SNF.

## 2023-07-24 NOTE — PLAN OF CARE
Case Management Re-assessment      PCP: Ziyad  [facility]     Patient Arrived From: home  Existing Help at Home: None     Barriers to Discharge: None     Discharge Plan:               A. SNF              B. Return to assisted living    Spoke with patient regarding discharge planning. Informed pt of acceptance to [facility]  and [facility] . Explained facilities will required documentation of financials and income for NH placement. Patient was informed to bring banking/income information during previous hospital stays. Pt stated he did not bring requested information and left his wallet at home. TN informed pt if financials cannot be obtained, the plan will be to return home, verbalized understanding. Pt asked to contact his nieces Val [phone] and Aleena [phone].     Spoke with pt's niece, Val, stated she is unable to assist and does not have access to pt's financial information.     Placed call to pt's niece, Aleena, no answer left detailed voice message. TN to continue to follow up.    07/24/23 1404   Discharge Reassessment   Assessment Type Discharge Planning Reassessment   Did the patient's condition or plan change since previous assessment? Yes   Discharge Plan discussed with: Patient   Communicated ELY with patient/caregiver Yes   DME Needed Upon Discharge  none   Transition of Care Barriers No family/friends to help   Why the patient remains in the hospital Requires continued medical care   Post-Acute Status   Post-Acute Authorization Placement   Post-Acute Placement Status Pending post-acute provider review/more information requested   Discharge Delays (!) Post-Acute Set-up

## 2023-07-24 NOTE — CARE UPDATE
Nurse messaged me that patient had chest pain in the left upper chest wall. EKG showed 1st degree block. Appears similar to previous EKG. Ordered STAT CXR and serial troponins. Arrived to bedside to evaluate patient. Laying in bed comfortably. Appears in no acute distress. Hemodynamically stable. On room air. Heart with RRR, Lungs CTAB, and chest wall does not appears to have any rash or lesions. Will continue to monitor at this time and follow up on imaging and troponins.

## 2023-07-24 NOTE — PLAN OF CARE
Problem: Diabetes Comorbidity  Goal: Blood Glucose Level Within Targeted Range  Outcome: Ongoing, Progressing  Intervention: Monitor and Manage Glycemia  Flowsheets (Taken 7/24/2023 0715)  Glycemic Management: blood glucose monitored     Problem: Adjustment to Illness (Sepsis/Septic Shock)  Goal: Optimal Coping  Outcome: Ongoing, Progressing  Intervention: Optimize Psychosocial Adjustment to Illness  Flowsheets (Taken 7/24/2023 0715)  Supportive Measures:   active listening utilized   self-care encouraged

## 2023-07-24 NOTE — SUBJECTIVE & OBJECTIVE
Interval History:  No acute overnight events.  Patient remained afebrile overnight.  Pain regimen changed yesterday with better pain controlled.  Clinically appears better this morning. Complains of generalized weakness. Alert oriented x4.    Review of Systems   Constitutional:  Negative for chills, fatigue and fever.   Respiratory:  Negative for cough and shortness of breath.    Cardiovascular:  Positive for leg swelling (chronic, history of anarsarca). Negative for chest pain and palpitations.   Gastrointestinal:  Negative for abdominal pain, diarrhea, nausea and vomiting.   Genitourinary:  Negative for difficulty urinating, dysuria, frequency and hematuria.   Musculoskeletal:  Positive for gait problem and myalgias. Negative for joint swelling.   Skin:  Positive for rash.   Neurological:  Positive for weakness. Negative for dizziness and headaches.     Objective:     Vital Signs (Most Recent):  Temp: 100.3 °F (37.9 °C) (07/24/23 1140)  Pulse: 79 (07/24/23 1140)  Resp: 20 (07/24/23 1140)  BP: (!) 157/72 (07/24/23 1140)  SpO2: (!) 94 % (07/24/23 1140) Vital Signs (24h Range):  Temp:  [98.2 °F (36.8 °C)-100.3 °F (37.9 °C)] 100.3 °F (37.9 °C)  Pulse:  [67-79] 79  Resp:  [16-20] 20  SpO2:  [94 %-98 %] 94 %  BP: (125-157)/(61-72) 157/72     Weight: 99.2 kg (218 lb 11.2 oz)  Body mass index is 30.5 kg/m².    Intake/Output Summary (Last 24 hours) at 7/24/2023 1240  Last data filed at 7/24/2023 0844  Gross per 24 hour   Intake 739.58 ml   Output 350 ml   Net 389.58 ml           Physical Exam  Vitals and nursing note reviewed.   Constitutional:       General: He is not in acute distress.     Appearance: He is obese. He is ill-appearing (chronically).   HENT:      Mouth/Throat:      Mouth: Mucous membranes are moist.   Eyes:      Extraocular Movements: Extraocular movements intact.   Cardiovascular:      Rate and Rhythm: Normal rate and regular rhythm.      Heart sounds: No murmur heard.    No gallop.   Pulmonary:       Effort: Pulmonary effort is normal. No respiratory distress.      Breath sounds: No wheezing.   Abdominal:      General: Bowel sounds are normal. There is no distension.      Tenderness: There is no abdominal tenderness. There is no guarding.   Musculoskeletal:         General: Swelling present.   Skin:     General: Skin is warm.      Findings: Rash present.      Comments: Noted lesion to right heel, right lateral calf as well as skin lesions to the right flank.  Refer to media images   Neurological:      Mental Status: He is alert and oriented to person, place, and time.      Motor: Weakness (generaized weakness) present.   Psychiatric:         Mood and Affect: Mood normal.         Behavior: Behavior normal.           Significant Labs: All pertinent labs within the past 24 hours have been reviewed.    Significant Imaging: I have reviewed all pertinent imaging results/findings within the past 24 hours.

## 2023-07-24 NOTE — PLAN OF CARE
Problem: Adult Inpatient Plan of Care  Goal: Plan of Care Review  Outcome: Ongoing, Progressing  Goal: Patient-Specific Goal (Individualized)  Outcome: Ongoing, Progressing  Goal: Absence of Hospital-Acquired Illness or Injury  Outcome: Ongoing, Progressing  Goal: Optimal Comfort and Wellbeing  Outcome: Ongoing, Progressing  Goal: Readiness for Transition of Care  Outcome: Ongoing, Progressing     Problem: Skin Injury Risk Increased  Goal: Skin Health and Integrity  Outcome: Ongoing, Progressing     Problem: Infection  Goal: Absence of Infection Signs and Symptoms  Outcome: Ongoing, Progressing     Problem: Diabetes Comorbidity  Goal: Blood Glucose Level Within Targeted Range  Outcome: Ongoing, Progressing     Problem: Adjustment to Illness (Sepsis/Septic Shock)  Goal: Optimal Coping  Outcome: Ongoing, Progressing     Problem: Bleeding (Sepsis/Septic Shock)  Goal: Absence of Bleeding  Outcome: Ongoing, Progressing     Problem: Glycemic Control Impaired (Sepsis/Septic Shock)  Goal: Blood Glucose Level Within Desired Range  Outcome: Ongoing, Progressing     Problem: Infection Progression (Sepsis/Septic Shock)  Goal: Absence of Infection Signs and Symptoms  Outcome: Ongoing, Progressing     Problem: Nutrition Impaired (Sepsis/Septic Shock)  Goal: Optimal Nutrition Intake  Outcome: Ongoing, Progressing     Problem: Fluid and Electrolyte Imbalance (Acute Kidney Injury/Impairment)  Goal: Fluid and Electrolyte Balance  Outcome: Ongoing, Progressing     Problem: Oral Intake Inadequate (Acute Kidney Injury/Impairment)  Goal: Optimal Nutrition Intake  Outcome: Ongoing, Progressing

## 2023-07-24 NOTE — PT/OT/SLP PROGRESS
Physical Therapy Treatment    Patient Name:  Chato Bowie   MRN:  [id number]    Recommendations:     Discharge Recommendations: nursing facility, skilled  Discharge Equipment Recommendations:  (per SNF)  Barriers to discharge:  Shingles, Pt is not functioning at baseline and is at increased risk for falls and readmission if he returns home at present time    Assessment:     Chato Bowie is a 72 y.o. male admitted with a medical diagnosis of Sepsis.  He presents with the following impairments/functional limitations: weakness, impaired endurance, decreased coordination, impaired coordination, impaired self care skills, impaired functional mobility, decreased lower extremity function, gait instability, decreased safety awareness, edema, impaired balance, pain .    Rehab Prognosis: Good; patient would benefit from acute skilled PT services to address these deficits and reach maximum level of function.    Recent Surgery: * No surgery found *      Plan:     During this hospitalization, patient to be seen  (3-5x/wk) to address the identified rehab impairments via gait training, therapeutic activities, therapeutic exercises, neuromuscular re-education, wheelchair management/training and progress toward the following goals:    Plan of Care Expires:  08/01/23    Subjective     Chief Complaint: pain  Patient/Family Comments/goals: Pt agreeable to treatment  Pain/Comfort:  Pain Rating 1:  (L knee, not rated)  Pain Addressed 1: Reposition, Distraction, Cessation of Activity      Objective:     Communicated with nsg prior to session.  Patient found HOB elevated with telemetry, Condom Catheter, peripheral IV upon PT entry to room.     General Precautions: Standard, airborne, fall, diabetic, contact  Orthopedic Precautions: N/A  Braces: N/A  Respiratory Status: Room air     Functional Mobility:  Bed Mobility:     Scooting: contact guard assistance  Supine to Sit: minimum assistance  Sit to Supine: minimum assistance  Transfers:      Sit to Stand:  contact guard assistance with rolling walker  Stand to sit: with max A x 2 persons with max Vc/TC for hand placement and safety  Gait: Gait training with RW and Min A>Max A approx 15' total.    Balance: Fair+ sit, fair>Poor stand       AM-PAC 6 CLICK MOBILITY  Turning over in bed (including adjusting bedclothes, sheets and blankets)?: 3  Sitting down on and standing up from a chair with arms (e.g., wheelchair, bedside commode, etc.): 3  Moving from lying on back to sitting on the side of the bed?: 3  Moving to and from a bed to a chair (including a wheelchair)?: 3  Need to walk in hospital room?: 3  Climbing 3-5 steps with a railing?: 1  Basic Mobility Total Score: 16       Treatment & Education:  Pt performed B FAQ's and AP's sitting at EOB with SBA and VC/TC to perform correctly x 10 reps. Pt stood with RW and CGA to urinate standing at commode.  Pt became weak on the way back from bathroom requiring Max A to stand with RW and Max A x 2 to transfer to chair.  Pt performed Chair to bed transfer with Mod A and Vc/TC for hand placement and technique.  Pt scooted to HOB in supine with VC/TC for bridging and hand placement with SBA.  Pt educated to perform B AP's, TKE's, and GS while in bed.  Pt verbalized/demonstrated understanding    Patient left HOB elevated with all lines intact, call button in reach, bed alarm on, nsg notified, and OT present..    GOALS:   Multidisciplinary Problems       Physical Therapy Goals          Problem: Physical Therapy    Goal Priority Disciplines Outcome Goal Variances Interventions   Physical Therapy Goal     PT, PT/OT Ongoing, Progressing     Description: Goals to be met by: 23     Patient will increase functional independence with mobility by performin. Supine to sit with Modified [place]  2. Rolling to Left and Right with Modified [place].  3. Sit to stand transfer with Modified [place]  4. Bed to chair transfer with Modified [place]     5. Gait  x 5-10 feet with Modified [place] using Rolling Walker.   6. Wheelchair propulsion x50-100 feet with Modified [place]   7. Lower extremity exercise program x10 reps per handout, with independence                         Time Tracking:     PT Received On: 07/24/23  PT Start Time: 1418     PT Stop Time: 1502  PT Total Time (min): 44 min     Billable Minutes: Gait Training 14, Therapeutic Activity 15, and Therapeutic Exercise 15    Treatment Type: Treatment  PT/PTA: PT     Number of PTA visits since last PT visit: 0     07/24/2023

## 2023-07-24 NOTE — ASSESSMENT & PLAN NOTE
This patient does have evidence of infective focus  My overall impression is sepsis.  Source: left knee vs superimposed cellulitis (shingles).  Suspect source is cellulitis at this time.  Antibiotics given-   Antibiotics (72h ago, onward)    Start     Stop Route Frequency Ordered    07/22/23 0430  piperacillin-tazobactam (ZOSYN) 4.5 g in dextrose 5 % in water (D5W) 5 % 100 mL IVPB (MB+)         -- IV Every 8 hours (non-standard times) 07/21/23 2244    07/19/23 0900  mupirocin 2 % ointment         07/24 0859 Nasl 2 times daily 07/19/23 0656        Latest lactate reviewed-  No results for input(s): LACTATE in the last 72 hours.  Organ dysfunction indicated by Acute kidney injury and Encephalopathy    Fluid challenge given 2 liters NS in ED - not hypotensive    Post- resuscitation assessment Yes Perfusion exam was performed within 6 hours of septic shock presentation after bolus shows Adequate tissue perfusion assessed by non-invasive monitoring       Will Not start Pressors  Source control achieved by: IV antibiotics    -Presented febrile, tachypneic and with leukocytosis  -Also have ELIZABETH on admission  -recently diagnosed with shingles, but patient has not started his prescription of the valacyclovir outpatient  -blood culture with no growth to date.    -continue valacyclovir for shingles and Zosyn for superimposed cellulitis. Treatment course should be completed on 07/24  -Leukocytosis resolved 07/19

## 2023-07-24 NOTE — PT/OT/SLP PROGRESS
Occupational Therapy   Treatment    Name: Chato Bowie  MRN: [id number]  Admitting Diagnosis:  Sepsis       Recommendations:     Discharge Recommendations: nursing facility, skilled  Discharge Equipment Recommendations:  none  Barriers to discharge:   (generalized weakness, not at PLOF, fall risk)    Assessment:     Chato Bowie is a 72 y.o. male with a medical diagnosis of Sepsis.    Performance deficits affecting function are weakness, impaired endurance, impaired self care skills, impaired functional mobility, gait instability, impaired balance, decreased coordination, decreased upper extremity function, decreased lower extremity function, decreased safety awareness, pain, impaired skin, edema.     Rehab Prognosis:  Good and Fair; patient would benefit from acute skilled OT services to address these deficits and reach maximum level of function.       Plan:     Patient to be seen 5 x/week to address the above listed problems via self-care/home management, therapeutic activities, therapeutic exercises  Plan of Care Expires: 08/01/23  Plan of Care Reviewed with: patient    Subjective     Chief Complaint: legs are swollen, feels weak  Patient/Family Comments/goals: walk to the bathroom  Pain/Comfort:  Pain Rating 1:  (yes-did not rate)  Location - Side 1: Bilateral  Location 1: leg (right hip/buttock)  Pain Addressed 1: Pre-medicate for activity, Reposition, Distraction, Cessation of Activity, Nurse notified    Objective:     Communicated with: nurseDalia prior to session.  Patient found HOB elevated with telemetry, peripheral IV, Condom Catheter upon OT entry to room.    General Precautions: Standard, airborne, fall, diabetic, contact (shingles)    Orthopedic Precautions:N/A  Braces: N/A  Respiratory Status: Room air     Occupational Performance:     Bed Mobility:    Patient completed Scooting/Bridging with stand by assistance and contact guard assistance  Patient completed Supine to Sit with minimum assistance  and with leg lift  Patient completed Sit to Supine with minimum assistance and with leg lift     Functional Mobility/Transfers:  Patient completed Sit <> Stand Transfer with minimum assistance  with  rolling walker and to stand from the bed and from the chair with verbal and tactile cues to rock    Functional Mobility: The patient amb using a RW around the bed, to the toilet to stand at the toilet to attempt to urinate. The patient req CGA for static standing balance at the toilet with the patient lean forward on the wall with his LUE for support. The patient required min assist to turn away from the toilet using a RW and then became increasingly more fatigue. The patient leaned against the bathroom door frame for support and required max assist x2 person to sit in the chair.  The patient transferred from chair to bed with mod assist x2 person      Activities of Daily Living:  Feeding:  modified independence and set up assist to eat his cold lunch while in an elevated bed. The patient was assisted to call  to order food that her likes for dinner and breakfast  Grooming: modified independence and set up to wash hands and face    Upper Body Dressing: moderate assistance to don back gown, max assist to doff back gown  Lower Body Dressing: dependence to don/doff B socks in bed  Toileting: stood at the toilet but was unable to urinate (condom cath was hooked up to wall suction at the end of the tx      [facility] 6 Click ADL: 14    Treatment & Education:  Performed self care and functional mobility as noted above  Performed AROM to B shoulder flex/ext, abd/adduction and forward punches x20 rep while seated on the EOB  Educated the patient re: pressure relief with use of the wedge to position on the left side    Patient left left sidelying with all lines intact, call button in reach, and nurse notified    GOALS:   Multidisciplinary Problems       Occupational Therapy Goals          Problem: Occupational Therapy     Goal Priority Disciplines Outcome Interventions   Occupational Therapy Goal     OT, PT/OT Ongoing, Progressing    Description: Goals to be met by: 8/1/23     Patient will increase functional independence with ADLs by performing:    Feeding with [place].  UE Dressing with Modified [place].  LE Dressing with Modified [place] and Supervision.  Grooming while standing at sink with Contact Guard Assistance and Assistive Devices as needed.  Toileting from bedside commode with Set-up Assistance and Supervision for hygiene and clothing management.   Rolling to Bilateral with Modified [place].   Supine to sit with Modified [place] and Supervision.  Step transfer with Contact Guard Assistance  Toilet transfer to bedside commode with Contact Guard Assistance.  Upper extremity exercise program x15 reps per handout, with assistance as needed.                         Time Tracking:     OT Date of Treatment: 07/24/23  OT Start Time: 1418  OT Stop Time: 1503  OT Total Time (min): 45 min    Billable Minutes:Self Care/Home Management 23  Therapeutic Activity 12  Therapeutic Exercise 10  Total Time 45 (with PT)    OT/JASON: OT          7/24/2023

## 2023-07-25 VITALS
HEART RATE: 70 BPM | WEIGHT: 218.69 LBS | DIASTOLIC BLOOD PRESSURE: 64 MMHG | SYSTOLIC BLOOD PRESSURE: 140 MMHG | TEMPERATURE: 99 F | RESPIRATION RATE: 20 BRPM | HEIGHT: 71 IN | OXYGEN SATURATION: 91 % | BODY MASS INDEX: 30.62 KG/M2

## 2023-07-25 LAB
POCT GLUCOSE: 110 MG/DL (ref 70–110)
POCT GLUCOSE: 172 MG/DL (ref 70–110)
POCT GLUCOSE: 179 MG/DL (ref 70–110)
TROPONIN I SERPL DL<=0.01 NG/ML-MCNC: 0.04 NG/ML (ref 0–0.03)

## 2023-07-25 PROCEDURE — 63600175 PHARM REV CODE 636 W HCPCS: Performed by: STUDENT IN AN ORGANIZED HEALTH CARE EDUCATION/TRAINING PROGRAM

## 2023-07-25 PROCEDURE — 97530 THERAPEUTIC ACTIVITIES: CPT | Mod: CQ

## 2023-07-25 PROCEDURE — 25000003 PHARM REV CODE 250: Performed by: STUDENT IN AN ORGANIZED HEALTH CARE EDUCATION/TRAINING PROGRAM

## 2023-07-25 PROCEDURE — 97530 THERAPEUTIC ACTIVITIES: CPT

## 2023-07-25 PROCEDURE — 97110 THERAPEUTIC EXERCISES: CPT | Mod: CQ

## 2023-07-25 PROCEDURE — 84484 ASSAY OF TROPONIN QUANT: CPT | Performed by: STUDENT IN AN ORGANIZED HEALTH CARE EDUCATION/TRAINING PROGRAM

## 2023-07-25 PROCEDURE — 97110 THERAPEUTIC EXERCISES: CPT

## 2023-07-25 PROCEDURE — 36415 COLL VENOUS BLD VENIPUNCTURE: CPT | Performed by: STUDENT IN AN ORGANIZED HEALTH CARE EDUCATION/TRAINING PROGRAM

## 2023-07-25 PROCEDURE — 97116 GAIT TRAINING THERAPY: CPT | Mod: CQ

## 2023-07-25 RX ORDER — GABAPENTIN 100 MG/1
100 CAPSULE ORAL 2 TIMES DAILY
Qty: 60 CAPSULE | Refills: 0 | Status: ON HOLD | OUTPATIENT
Start: 2023-07-25 | End: 2024-01-19 | Stop reason: HOSPADM

## 2023-07-25 RX ORDER — AMLODIPINE BESYLATE 10 MG/1
10 TABLET ORAL DAILY
Qty: 90 TABLET | Refills: 3 | Status: ON HOLD | OUTPATIENT
Start: 2023-07-26 | End: 2023-09-22 | Stop reason: HOSPADM

## 2023-07-25 RX ORDER — HYDRALAZINE HYDROCHLORIDE 100 MG/1
100 TABLET, FILM COATED ORAL EVERY 8 HOURS
Qty: 270 TABLET | Refills: 3 | Status: ON HOLD | OUTPATIENT
Start: 2023-07-25 | End: 2023-09-22 | Stop reason: SDUPTHER

## 2023-07-25 RX ORDER — HYDROCODONE BITARTRATE AND ACETAMINOPHEN 10; 325 MG/1; MG/1
1-2 TABLET ORAL EVERY 4 HOURS PRN
Qty: 60 TABLET | Refills: 0 | Status: ON HOLD | OUTPATIENT
Start: 2023-07-25 | End: 2023-08-09 | Stop reason: HOSPADM

## 2023-07-25 RX ADMIN — PIPERACILLIN AND TAZOBACTAM 4.5 G: 4; .5 INJECTION, POWDER, LYOPHILIZED, FOR SOLUTION INTRAVENOUS; PARENTERAL at 03:07

## 2023-07-25 RX ADMIN — APIXABAN 5 MG: 5 TABLET, FILM COATED ORAL at 09:07

## 2023-07-25 RX ADMIN — METOPROLOL SUCCINATE 25 MG: 25 TABLET, EXTENDED RELEASE ORAL at 09:07

## 2023-07-25 RX ADMIN — ACETAMINOPHEN 1000 MG: 500 TABLET, FILM COATED ORAL at 05:07

## 2023-07-25 RX ADMIN — HYDRALAZINE HYDROCHLORIDE 100 MG: 25 TABLET, FILM COATED ORAL at 02:07

## 2023-07-25 RX ADMIN — TAMSULOSIN HYDROCHLORIDE 0.4 MG: 0.4 CAPSULE ORAL at 09:07

## 2023-07-25 RX ADMIN — HYDROCODONE BITARTRATE AND ACETAMINOPHEN 1 TABLET: 10; 325 TABLET ORAL at 09:07

## 2023-07-25 RX ADMIN — POLYETHYLENE GLYCOL 3350 17 G: 17 POWDER, FOR SOLUTION ORAL at 09:07

## 2023-07-25 RX ADMIN — ACETAMINOPHEN 1000 MG: 500 TABLET, FILM COATED ORAL at 06:07

## 2023-07-25 RX ADMIN — FERROUS SULFATE TAB 325 MG (65 MG ELEMENTAL FE) 1 EACH: 325 (65 FE) TAB at 09:07

## 2023-07-25 RX ADMIN — AMIODARONE HYDROCHLORIDE 200 MG: 200 TABLET ORAL at 09:07

## 2023-07-25 RX ADMIN — HYDROCODONE BITARTRATE AND ACETAMINOPHEN 1 TABLET: 10; 325 TABLET ORAL at 07:07

## 2023-07-25 RX ADMIN — GABAPENTIN 100 MG: 100 CAPSULE ORAL at 09:07

## 2023-07-25 RX ADMIN — AMLODIPINE BESYLATE 10 MG: 5 TABLET ORAL at 09:07

## 2023-07-25 RX ADMIN — HYDRALAZINE HYDROCHLORIDE 100 MG: 25 TABLET, FILM COATED ORAL at 06:07

## 2023-07-25 NOTE — PLAN OF CARE
Problem: Occupational Therapy  Goal: Occupational Therapy Goal  Description: Goals to be met by: 8/1/23     Patient will increase functional independence with ADLs by performing:    Feeding with [place].  UE Dressing with Modified [place].  LE Dressing with Modified [place] and Supervision.  Grooming while standing at sink with Contact Guard Assistance and Assistive Devices as needed.  Toileting from bedside commode with Set-up Assistance and Supervision for hygiene and clothing management.   Rolling to Bilateral with Modified [place].   Supine to sit with Modified [place] and Supervision.  Step transfer with Contact Guard Assistance  Toilet transfer to bedside commode with Contact Guard Assistance.  Upper extremity exercise program x15 reps per handout, with assistance as needed.    Outcome: Ongoing, Progressing   The patient tolerated sitting in the chair ~3 hours. The patient required CGA/min assist to stand from the chair and amb with a RW around the bed. The patient is limited by weakness and right hip pain at wound site.    The patient requires assist with all self care and functional mobility and is not safe to D/C home alone.

## 2023-07-25 NOTE — NURSING
[facility] [place]  Nurses Note -- 4 Eyes      7/24/2023      Skin assessed on: Q Shift      [] No Pressure Injuries Present    []Prevention Measures Documented    [x] Yes LDA  for Pressure Injury Previously documented     [] Yes New Pressure Injury Discovered   [] LDA for New Pressure Injury Added      Attending RN:  Ethel Hernandez LPN     Second RN:  Dalia Amezquita RN

## 2023-07-25 NOTE — DISCHARGE SUMMARY
[facility] Medicine  Discharge Summary      Patient Name: Chato Bowie  MRN: [id number]  [facility]: [id number]  Patient Class: IP- Inpatient  Admission Date: 7/17/2023  Hospital Length of Stay: 8 days  Discharge Date and Time:  07/25/2023 3:42 PM  Attending Physician: Abhijeet Veloz MD   Discharging Provider: Abhijeet Veloz MD  [facility]    Primary Care Team: Networked reference to record PCT     HPI:   Mr. Bowie is a 72-year-old with extensive past medical history including CHF, arthritis, coronary artery disease, diabetes mellitus type 2, hypertension and hyperlipidemia who presents to the emergency department for evaluation of pain and swelling.  Patient was recently diagnosed with shingles and sent home with foul acyclovir.  Patient was found at home unable to get up from his chair and sitting in urine and feces.  Patient is not able to give much history in the emergency department so most of this history was obtained from medical review and providers in the emergency department.    In the emergency department, patient was found to be febrile of 100.8 with initial blood pressure 125/68 saturating 98% on room air.  Lab work revealed a leukocytosis of 17.4, acute on chronic kidney disease with creatinine 5.4  with elevated procalcitonin.  Patient underwent ultrasound of lower extremities which did not reveal DVT.  CT abdomen and pelvis did not show any acute intra-abdominal abnormalities but does note soft tissue edema in the bilateral hip regions.  Patient also found to have concerns for infection of right flank and hip wounds.  Patient admitted to hospital medicine for further management.        * No surgery found *      Hospital Course:    72-year-old with extensive past medical history including CHF, arthritis, coronary artery disease, diabetes mellitus type 2, hypertension and hyperlipidemia admitted on 7/17/2023 for sepsis likely secondary to shingles and  overlying cellulitis and acute kidney injury. Stated on IV antibiotics and continued on valacyclovir for shingles treatment. Wound care consulted. Also found to have elevated troponin and hypokalemia.  Electrolytes replaced as needed.  Suspect troponin elevation due to demand in the setting of sepsis and ELIZABETH.  ECG reviewed and read as unusual P axis. Has known LBBB. Recent echo with normal EF and grade II diastolic dysfunction.  Troponin trended down.  Patient without any chest pain or shortness of breath. ELIZABETH improving and leukocytosis resolved. PT/OT consulted-recommends SNF. Patient unable to care for himself, will likely need to transition to assisted NH. Patient medically clear and stable for discharge to SNF. Awaiting placement.      CM attempted to get patient's financials and patient unwilling. Family unwilling to assist. Will get home with HH and have  assigned so he can get placed in NH if possible. HH wound care ordered. Pain meds given.      Stop taking losartan   Start taking Amlodipine   Increase Hydralazine   Pain meds ordered for you (both Norco and RADHIKA)   Follow up with PCP and Nephrology   Wound care will come to your house, also PT/OT- please answer phone and door.    Thank you for trusting [facility] and me with your care.   Let me know if there is anything more I can do!!        Abhijeet Veloz MD  Internal Medicine Staff    Goals of Care Treatment Preferences:  Code Status: Full Code      Consults:     No new Assessment & Plan notes have been filed under this hospital service since the last note was generated.  Service: Hospital Medicine    Final Active Diagnoses:    Diagnosis Date Noted POA    PRINCIPAL PROBLEM:  Sepsis [A41.9] 03/07/2016 Yes    Skin rash [R21] 07/17/2023 Yes    Falls [W19.XXXA] 07/19/2023 Yes    Elevated troponin [R77.8] 07/17/2023 Yes    Acute encephalopathy [G93.40] 07/17/2023 Yes    DJD (degenerative joint disease) [M19.90]  06/14/2023 Yes    Class 1 obesity due to excess calories with serious comorbidity and body mass index (BMI) of 30.0 to 30.9 in adult [E66.09, Z68.30] 05/12/2022 Not Applicable    Anasarca [R60.1] 02/08/2022 Yes    BPH (benign prostatic hyperplasia) [N40.0] 11/24/2020 Yes    Type 2 diabetes mellitus with hyperglycemia [E11.65] 11/24/2020 Yes     Chronic    Left knee pain [M25.562] 03/23/2020 Yes    Hypokalemia [E87.6] 02/14/2019 Yes    Acute kidney injury superimposed on chronic kidney disease [N17.9, N18.9] 03/10/2017 Yes    Essential hypertension [I10] 03/10/2017 Yes      Problems Resolved During this Admission:       Discharged Condition: stable    Disposition:     Follow Up:   Follow-up Information     [facility].    Contact information:  [address] [place] [address]  [place] [address]  [phone]                       Patient Instructions:      Ambulatory referral/consult to Internal Medicine   Standing Status: Future   Referral Priority: Routine Referral Type: Consultation   Referral Reason: Specialty Services Required   Requested Specialty: Internal Medicine   Number of Visits Requested: 1     Ambulatory referral/consult to Nephrology   Standing Status: Future   Referral Priority: Routine Referral Type: Consultation   Referral Reason: Specialty Services Required   Requested Specialty: Nephrology   Number of Visits Requested: 1         Recent Results (from the past 100 hour(s))   POCT glucose    Collection Time: 07/21/23 12:55 PM   Result Value Ref Range    POCT Glucose 152 (H) 70 - 110 mg/dL   POCT glucose    Collection Time: 07/21/23  4:05 PM   Result Value Ref Range    POCT Glucose 148 (H) 70 - 110 mg/dL   POCT glucose    Collection Time: 07/21/23  8:18 PM   Result Value Ref Range    POCT Glucose 121 (H) 70 - 110 mg/dL   Basic Metabolic Panel    Collection Time: 07/22/23  4:14 AM   Result Value Ref Range    Sodium 136 136 - 145 mmol/L    Potassium 4.9 3.5 - 5.1 mmol/L    Chloride 101 95 - 110  mmol/L    CO2 27 23 - 29 mmol/L    Glucose 96 70 - 110 mg/dL    BUN 42 (H) 8 - 23 mg/dL    Creatinine 2.1 (H) 0.5 - 1.4 mg/dL    Calcium 8.7 8.7 - 10.5 mg/dL    Anion Gap 8 8 - 16 mmol/L    eGFR 33 (A) >60 mL/min/1.73 m^2   Phosphorus    Collection Time: 07/22/23  4:14 AM   Result Value Ref Range    Phosphorus 3.3 2.7 - 4.5 mg/dL   Magnesium    Collection Time: 07/22/23  4:14 AM   Result Value Ref Range    Magnesium 1.8 1.6 - 2.6 mg/dL   POCT glucose    Collection Time: 07/22/23  8:17 AM   Result Value Ref Range    POCT Glucose 90 70 - 110 mg/dL   POCT glucose    Collection Time: 07/22/23 12:22 PM   Result Value Ref Range    POCT Glucose 103 70 - 110 mg/dL   POCT glucose    Collection Time: 07/22/23  4:43 PM   Result Value Ref Range    POCT Glucose 111 (H) 70 - 110 mg/dL   POCT glucose    Collection Time: 07/22/23  8:52 PM   Result Value Ref Range    POCT Glucose 177 (H) 70 - 110 mg/dL   POCT glucose    Collection Time: 07/23/23  7:56 AM   Result Value Ref Range    POCT Glucose 154 (H) 70 - 110 mg/dL   POCT glucose    Collection Time: 07/23/23 12:01 PM   Result Value Ref Range    POCT Glucose 208 (H) 70 - 110 mg/dL   POCT glucose    Collection Time: 07/23/23  4:30 PM   Result Value Ref Range    POCT Glucose 189 (H) 70 - 110 mg/dL   POCT glucose    Collection Time: 07/23/23  8:48 PM   Result Value Ref Range    POCT Glucose 213 (H) 70 - 110 mg/dL   POCT glucose    Collection Time: 07/24/23  8:01 AM   Result Value Ref Range    POCT Glucose 108 70 - 110 mg/dL   POCT glucose    Collection Time: 07/24/23 11:39 AM   Result Value Ref Range    POCT Glucose 126 (H) 70 - 110 mg/dL   Troponin I    Collection Time: 07/24/23 12:29 PM   Result Value Ref Range    Troponin I 0.034 (H) 0.000 - 0.026 ng/mL   POCT glucose    Collection Time: 07/24/23  4:55 PM   Result Value Ref Range    POCT Glucose 142 (H) 70 - 110 mg/dL   Troponin I    Collection Time: 07/24/23  6:38 PM   Result Value Ref Range    Troponin I 0.045 (H) 0.000 - 0.026  ng/mL   POCT glucose    Collection Time: 07/24/23  8:27 PM   Result Value Ref Range    POCT Glucose 181 (H) 70 - 110 mg/dL   Troponin I    Collection Time: 07/25/23 12:46 AM   Result Value Ref Range    Troponin I 0.045 (H) 0.000 - 0.026 ng/mL   POCT glucose    Collection Time: 07/25/23  7:41 AM   Result Value Ref Range    POCT Glucose 110 70 - 110 mg/dL   POCT glucose    Collection Time: 07/25/23 11:34 AM   Result Value Ref Range    POCT Glucose 179 (H) 70 - 110 mg/dL       Microbiology Results (last 7 days)     Procedure Component Value Units Date/Time    Blood culture x two cultures. Draw prior to antibiotics. [[id number]] Collected: 07/17/23 1241    Order Status: Completed Specimen: Blood from Peripheral, Hand, Right Updated: 07/21/23 1303     Blood Culture, Routine No Growth after 4 days.    Narrative:      Aerobic and anaerobic    Blood culture x two cultures. Draw prior to antibiotics. [[id number]] Collected: 07/17/23 1239    Order Status: Completed Specimen: Blood from Peripheral, Antecubital, Left Updated: 07/21/23 1303     Blood Culture, Routine No Growth after 4 days.    Narrative:      Aerobic and anaerobic          Imaging Results          CT Head Without Contrast (Final result)  Result time 07/17/23 19:03:22    Final result by Mine Dasilva MD (07/17/23 19:03:22)                 Impression:      No acute intracranial abnormalities identified.  No significant detrimental change compared to recent CT head 06/01/2023.      Electronically signed by: Mine Dasilva MD  Date:    07/17/2023  Time:    19:03             Narrative:    EXAMINATION:  CT HEAD WITHOUT CONTRAST    CLINICAL HISTORY:  Mental status change, unknown cause;    TECHNIQUE:  Low dose axial images were obtained through the head.  Coronal and sagittal reformations were also performed. Contrast was not administered.    COMPARISON:  06/01/2023.    FINDINGS:  There is generalized cerebral volume loss with extensive chronic microvascular ischemic  disease.  Small region of remote infarction with encephalomalacia is seen in the left frontal lobe.  No evidence of acute/recent major vascular distribution cerebral infarction, intraparenchymal hemorrhage, or intra-axial space occupying lesion. The ventricular system is normal in size and configuration with no evidence of hydrocephalus. No effacement of the skull-base cisterns. No abnormal extra-axial fluid collections or blood products. Visualized paranasal sinuses and mastoid air cells are clear. The calvarium shows no significant abnormality.                               X-Ray Knee 1 or 2 View Left (Final result)  Result time 07/17/23 18:34:51    Final result by Mine Dasilva MD (07/17/23 18:34:51)                 Impression:      No acute osseous abnormality identified.  Additional findings as detailed above.  No significant change.      Electronically signed by: Mine Dasilva MD  Date:    07/17/2023  Time:    18:34             Narrative:    EXAMINATION:  XR KNEE 1 OR 2 VIEW LEFT    CLINICAL HISTORY:  swelling, pain;    TECHNIQUE:  AP and lateral views of the left knee were performed.    COMPARISON:  07/05/2023.    FINDINGS:  No evidence of acute displaced fracture, dislocation, or osseous destructive process.  Severe joint space narrowing is seen at the medial tibiofemoral compartment.  There is significant suprapatellar joint effusion.  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  Surrounding soft tissue edema is seen.                               CT Abdomen Pelvis  Without Contrast (Final result)  Result time 07/17/23 17:30:31    Final result by Mine Dasilva MD (07/17/23 17:30:31)                 Impression:      1. No acute intra-abdominal abnormalities identified.  2. Cholelithiasis with suspected gallbladder sludge.  3. Additional findings as detailed above.      Electronically signed by: Mine Dasilva MD  Date:    07/17/2023  Time:    17:30              Narrative:    EXAMINATION:  CT ABDOMEN PELVIS WITHOUT CONTRAST    CLINICAL HISTORY:  RLQ abdominal pain (Age >= 14y);    TECHNIQUE:  Low dose axial images, sagittal and coronal reformations were obtained from the lung bases to the pubic symphysis.  Oral contrast was not administered.    COMPARISON:  CT abdomen pelvis 06/26/2023.    FINDINGS:  Heart is mildly enlarged with coronary artery and aortic valve calcification seen.  The lung bases are clear.    Small calcified granulomas are seen within the liver and spleen.  There is no intra-or extrahepatic biliary ductal dilatation.  There is cholelithiasis with hyperdense layering material seen within the gallbladder which may represent hyperdense sludge.  Stomach, pancreas, and adrenal glands are unremarkable.    Kidneys show no evidence of stones or hydronephrosis. Ureters are unremarkable along their courses.  Urinary bladder is collapsed around a De Leon catheter.  Prostate is small in size or has been removed.    No evidence of appendicitis.  Moderate volume retained stool is seen within the colon.  The visualized loops of small and large bowel show no evidence of obstruction or inflammation.  No free air or free fluid.    Aorta tapers normally with mild atherosclerosis.    No acute osseous abnormality identified.  Multilevel degenerative changes are seen within the spine.  Nonspecific subcutaneous soft tissue edema is seen involving the bilateral hip regions.                               US Lower Extremity Veins Bilateral (Final result)  Result time 07/17/23 16:31:14    Final result by Elbert Nunez MD (07/17/23 16:31:14)                 Impression:      No evidence of deep venous thrombosis in either lower extremity.      Electronically signed by: Elbert Nunez  Date:    07/17/2023  Time:    16:31             Narrative:    EXAMINATION:  US LOWER EXTREMITY VEINS BILATERAL    CLINICAL HISTORY:  Other specified soft tissue disorders    TECHNIQUE:  Duplex and color  flow Doppler and dynamic compression was performed of the bilateral lower extremity veins was performed.    COMPARISON:  None    FINDINGS:  Right thigh veins: The common femoral, femoral, popliteal, upper greater saphenous, and deep femoral veins are patent and free of thrombus. The veins are normally compressible and have normal phasic flow and augmentation response.    Right calf veins: The visualized calf veins are patent.    Left thigh veins: The common femoral, femoral, popliteal, upper greater saphenous, and deep femoral veins are patent and free of thrombus. The veins are normally compressible and have normal phasic flow and augmentation response.    Left calf veins: The visualized calf veins are patent.    Miscellaneous: None                               X-Ray Chest AP Portable (Final result)  Result time 07/17/23 12:57:52    Final result by Martin Thorpe MD (07/17/23 12:57:52)                 Impression:      See above      Electronically signed by: Martin Thorpe MD  Date:    07/17/2023  Time:    12:57             Narrative:    EXAMINATION:  XR CHEST AP PORTABLE    CLINICAL HISTORY:  Sepsis;    TECHNIQUE:  Single frontal view of the chest was performed.    COMPARISON:  No 07/05/2023 ne    FINDINGS:  Heart size is normal..  Lungs are clear.  No pleural effusion                                    Pending Diagnostic Studies:     None         Medications:  Reconciled Home Medications:      Medication List      START taking these medications    amLODIPine 10 MG tablet  Commonly known as: NORVASC  Take 1 tablet (10 mg total) by mouth once daily.  Start taking on: July 26, 2023     HYDROcodone-acetaminophen  mg per tablet  Commonly known as: NORCO  Take 1 to 2 tablets by mouth every 4 (four) hours as needed for Pain.        CHANGE how you take these medications    gabapentin 100 MG capsule  Commonly known as: NEURONTIN  Take 1 capsule (100 mg total) by mouth 2 (two) times daily.  What changed:    · medication strength  · how much to take  · when to take this     hydrALAZINE 100 MG tablet  Commonly known as: APRESOLINE  Take 1 tablet (100 mg total) by mouth every 8 (eight) hours.  What changed:   · medication strength  · how much to take  · when to take this        CONTINUE taking these medications    albuterol 90 mcg/actuation inhaler  Commonly known as: PROVENTIL/VENTOLIN HFA  Inhale 2 puffs into the lungs every 6 (six) hours as needed for Wheezing or Shortness of Breath.     amiodarone 200 MG Tab  Commonly known as: PACERONE  Take 200 mg by mouth once daily.     diclofenac sodium 1 % Gel  Commonly known as: VOLTAREN  Apply 2 g topically 2 (two) times daily as needed (pain).     ELIQUIS 5 mg Tab  Generic drug: apixaban  Take 5 mg by mouth 2 (two) times daily.     furosemide 40 MG tablet  Commonly known as: LASIX  Take 1 tablet (40 mg total) by mouth 2 (two) times daily.     insulin aspart U-100 100 unit/mL (3 mL) Inpn pen  Commonly known as: NovoLOG  Inject 5 Units into the skin 3 (three) times daily.     magnesium oxide 400 mg (241.3 mg magnesium) tablet  Commonly known as: MAG-OX  Take 800 mg by mouth.     metOLazone 5 MG tablet  Commonly known as: ZAROXOLYN  Take 5 mg by mouth 3 (three) times a week.     metoprolol succinate 25 MG 24 hr tablet  Commonly known as: TOPROL-XL  Take 25 mg by mouth once daily.     NovoLIN 70/30 U-100 Insulin 100 unit/mL (70-30) injection  Generic drug: insulin NPH-insulin regular (70/30)  Inject 55 Units into the skin 2 (two) times daily.     potassium chloride 10 MEQ Tbsr  Commonly known as: KLOR-CON  Take 10 mEq by mouth once daily.     rosuvastatin 40 MG Tab  Commonly known as: CRESTOR  Take 40 mg by mouth every evening.     sildenafiL 100 MG tablet  Commonly known as: VIAGRA  TAKE 1 TABLET BY MOUTH ONCE DAILY AS NEEDED FOR ERECTILE DYSFUNCTION.     tamsulosin 0.4 mg Cap  Commonly known as: FLOMAX  Take 1 capsule (0.4 mg total) by mouth once daily.        STOP taking  these medications    losartan 50 MG tablet  Commonly known as: COZAAR     traMADoL 50 mg tablet  Commonly known as: ULTRAM     valACYclovir 1000 MG tablet  Commonly known as: VALTREX            Indwelling Lines/Drains at time of discharge:   Lines/Drains/Airways     Drain  Duration           Male External Urinary Catheter 07/23/23 2000 1 day                Time spent on the discharge of patient: 35 minutes         Abhijeet Veloz MD  Department of Hospital Medicine  [facility]

## 2023-07-25 NOTE — PLAN OF CARE
Placed additional call to patient's niece, Aleena, left detailed voice message. Pt unable to provide requested financial documentation for NH placement. Plan to discharge with home health and out patient  with [facility] to follow up and assist with placement. Per Suyapa with [facility], will notified clinic and MD. Emailed 142/pasrr to Suyapa to provided to out patient SW. Pt previously had [facility] and plan will be to resume services upon discharge. Physician notified.     3:38pm Per physician pt will discharge today with home health. Plan of care and clinicals sent to [facility] via care port. Placed call to Aleena with [facility], left voice message.  Per Suyapa with [facility], pcp follow up appointment requested.     Had in depth conversation with patient regarding discharge and NH placement. Explained that for NH placement with any facility, he will be required to provide financial and income documentation. Also informed pt he will need to call the contact information on the back of his debt care in order to set up an online account to retrieve statements. [facility] STARK to follow up with patient.     4:15pm Per Aleena with [facility], unable to staff at this time.     4:25pm Per gerri steinberg, [facility] unable to accept patient at this time.     [facility] , pt not in network and next available 8/2.    Multiple referrals sent via care port. Spoke with Suyapa with [facility], informed [facility] unable to accept. [facility] will continue to follow for home health.    07/25/23 1346   Post-Acute Status   Post-Acute Authorization Home Health;Placement   Post-Acute Placement Status Pending medical clearance/testing   Home Health Status Pending medical clearance/testing   Coverage [employer] Man[employer] Medicare   Discharge Delays None known at this time   Discharge Plan   Discharge Plan A Home Health   Discharge Plan B New Nursing Home placement - [facility] care facility

## 2023-07-25 NOTE — PLAN OF CARE
[facility] - Med Surg  Discharge Final Note    Patient clear to discharge from case management stand point. Transportation requested.   Primary Care Provider: [facility]    Expected Discharge Date: 7/25/2023    Final Discharge Note (most recent)       Final Note - 07/25/23 1638          Final Note    Assessment Type Final Discharge Note (P)      Anticipated Discharge Disposition Home-Health Care Svc (P)      Hospital Resources/Appts/Education Provided Provided patient/caregiver with written discharge plan information;Community resources provided (P)         Post-Acute Status    Post-Acute Placement Status Discharge Plan Changed (P)      Discharge Delays None known at this time (P)                      Important Message from Medicare  Important Message from Medicare regarding Discharge Appeal Rights: Given to patient/caregiver, Explained to patient/caregiver, Other (comments) ([place] spoke with patient via phone [phone], verbalized understanding. Copy mailed certified to address in chart. [phone])     Date IMM was signed: 07/25/23  Time IMM was signed: 1300    Contact Info       [facility] Valenzuela   Relationship: PCP - General    [place] [id number]   Phone: [phone]       Next Steps: Follow up

## 2023-07-25 NOTE — PLAN OF CARE
ADT 30 order placed for [facility] Transportation.  Requested  time: 5:00pm   If transportation does not arrive at ETA time nurse will be instructed to follow protocol for transportation below:  How can I get in touch directly with dispatch, if needed?                 Non-emergent dispatch: [phone] opt 6    +++NURSING:  If Van does not arrive at requested time please call the above Non Emergent Dispatcher.  If issue not resolved please escalate to your charge nurse for further instructions.    Transport to [address] [place] [address] AVE    [facility] [id number]

## 2023-07-25 NOTE — NURSING
Patient reported chest pain, EKG performed at bedside, provider notified. No acute distress noted. No new orders at this time. Plan of care continued.

## 2023-07-25 NOTE — PLAN OF CARE
Patient AAOx4, no acute distress noted, patient free from falls or injury this shift.  Bed in low position, wheels locked, bed alarm on, call light in reach for assistance, plan of care continued.      Problem: Impaired Wound Healing  Goal: Optimal Wound Healing  Outcome: Ongoing, Progressing  Intervention: Promote Wound Healing  Flowsheets (Taken 7/25/2023 0500)  Sleep/Rest Enhancement: awakenings minimized     Problem: Fall Injury Risk  Goal: Absence of Fall and Fall-Related Injury  Outcome: Ongoing, Progressing  Intervention: Identify and Manage Contributors  Flowsheets (Taken 7/25/2023 0500)  Self-Care Promotion: independence encouraged  Medication Review/Management: medications reviewed

## 2023-07-25 NOTE — PLAN OF CARE
[facility] - [facility]      HOME HEALTH ORDERS  FACE TO FACE ENCOUNTER    Patient Name: Chato Bowie  YOB: 1950    PCP: Irlanda Valenzuela   PCP Address: [address] / [address]  PCP Phone Number: [phone]  PCP Fax: [phone]    Encounter Date: 7/17/23    Admit to Home Health    Diagnoses:  Active Hospital Problems    Diagnosis  POA    *Sepsis [A41.9]  Yes     Priority: 1 - High    Skin rash [R21]  Yes     Priority: 2     Falls [W19.XXXA]  Yes    Elevated troponin [R77.8]  Yes    Acute encephalopathy [G93.40]  Yes    DJD (degenerative joint disease) [M19.90]  Yes    Class 1 obesity due to excess calories with serious comorbidity and body mass index (BMI) of 30.0 to 30.9 in adult [E66.09, Z68.30]  Not Applicable    Anasarca [R60.1]  Yes    BPH (benign prostatic hyperplasia) [N40.0]  Yes    Type 2 diabetes mellitus with hyperglycemia [E11.65]  Yes     Chronic    Left knee pain [M25.562]  Yes    Hypokalemia [E87.6]  Yes    Acute kidney injury superimposed on chronic kidney disease [N17.9, N18.9]  Yes    Essential hypertension [I10]  Yes      Resolved Hospital Problems   No resolved problems to display.       Follow Up Appointments:  No future appointments.    Allergies:Review of patient's allergies indicates:  No Known Allergies    Medications: Review discharge medications with patient and family and provide education.    Current Facility-Administered Medications   Medication Dose Route Frequency Provider Last Rate Last Admin    acetaminophen tablet 1,000 mg  1,000 mg Oral Q8H Abhijeet Veloz MD   1,000 mg at 07/25/23 0609    albuterol sulfate nebulizer solution 2.5 mg  2.5 mg Nebulization Q4H PRN Jose L Evans III, MD        amiodarone tablet 200 mg  200 mg Oral Daily Sea Phelan MD   200 mg at 07/25/23 0907    amLODIPine tablet 10 mg  10 mg Oral Daily Tiffanie Rojas DO   10 mg at 07/25/23 0908    apixaban tablet 5 mg  5 mg Oral BID Sea Phelan MD   5 mg at 07/25/23 0908     dextrose 10% bolus 125 mL 125 mL  12.5 g Intravenous PRN Sea Phelan MD        dextrose 10% bolus 250 mL 250 mL  25 g Intravenous PRN Sea Phelan MD        ferrous sulfate tablet 1 each  1 tablet Oral Daily La-My T. Rojas, DO   1 each at 07/25/23 0908    gabapentin capsule 100 mg  100 mg Oral BID Abhijeet Veloz MD   100 mg at 07/25/23 0908    glucagon (human recombinant) injection 1 mg  1 mg Intramuscular PRN Sea Phelan MD        glucose chewable tablet 16 g  16 g Oral PRN Sea Phelan MD        glucose chewable tablet 24 g  24 g Oral PRN Sea Phelan MD        hydrALAZINE injection 10 mg  10 mg Intravenous Q8H PRN La-My T. Rojas, DO        hydrALAZINE tablet 100 mg  100 mg Oral Q8H La-My T. Rojas, DO   100 mg at 07/25/23 1403    HYDROcodone-acetaminophen  mg per tablet 1 tablet  1 tablet Oral Q4H PRN Abhijeet Veloz MD   1 tablet at 07/25/23 0951    HYDROcodone-acetaminophen 5-325 mg per tablet 1 tablet  1 tablet Oral Q4H PRN Abhijeet Veloz MD        HYDROmorphone injection 0.2 mg  0.2 mg Intravenous Q6H PRN Abhijeet Veloz MD   0.2 mg at 07/23/23 0804    insulin aspart U-100 pen 0-5 Units  0-5 Units Subcutaneous QID (AC + HS) PRN Sea Phelan MD   0 Units at 07/23/23 2258    metoprolol succinate (TOPROL-XL) 24 hr tablet 25 mg  25 mg Oral Daily Sea Phelan MD   25 mg at 07/25/23 0908    naloxone 0.4 mg/mL injection 0.02 mg  0.02 mg Intravenous PRN Sea Phelan MD        ondansetron disintegrating tablet 8 mg  8 mg Oral Q8H PRN Sea Phelan MD   8 mg at 07/22/23 1729    polyethylene glycol packet 17 g  17 g Oral Daily Sea Phelan MD   17 g at 07/25/23 0907    sodium chloride 0.9% flush 10 mL  10 mL Intravenous Q12H PRN Sea Phelan MD        tamsulosin 24 hr capsule 0.4 mg  0.4 mg Oral Daily Sea Phelan MD   0.4 mg at 07/25/23 0908    Tdap (BOOSTRIX) vaccine injection 0.5 mL  0.5 mL Intramuscular vaccine x 1 dose Martin Hopper MD         Current Discharge Medication List         START taking these medications    Details   amLODIPine (NORVASC) 10 MG tablet Take 1 tablet (10 mg total) by mouth once daily.  Qty: 90 tablet, Refills: 3    Comments: .      HYDROcodone-acetaminophen (NORCO)  mg per tablet Take 1-2 tablets by mouth every 4 (four) hours as needed for Pain.  Qty: 60 tablet, Refills: 0    Comments: Quantity prescribed more than 7 day supply? no           CONTINUE these medications which have CHANGED    Details   gabapentin (NEURONTIN) 100 MG capsule Take 1 capsule (100 mg total) by mouth 2 (two) times daily.  Qty: 60 capsule, Refills: 0      hydrALAZINE (APRESOLINE) 100 MG tablet Take 1 tablet (100 mg total) by mouth every 8 (eight) hours.  Qty: 270 tablet, Refills: 3    Comments: .           CONTINUE these medications which have NOT CHANGED    Details   albuterol (PROVENTIL/VENTOLIN HFA) 90 mcg/actuation inhaler Inhale 2 puffs into the lungs every 6 (six) hours as needed for Wheezing or Shortness of Breath.  Qty: 8.5 g, Refills: 0      amiodarone (PACERONE) 200 MG Tab Take 200 mg by mouth once daily.      diclofenac sodium (VOLTAREN) 1 % Gel Apply 2 g topically 2 (two) times daily as needed (pain).  Qty: 100 g, Refills: 0      ELIQUIS 5 mg Tab Take 5 mg by mouth 2 (two) times daily.      furosemide (LASIX) 40 MG tablet Take 1 tablet (40 mg total) by mouth 2 (two) times daily.      insulin aspart U-100 (NOVOLOG) 100 unit/mL (3 mL) InPn pen Inject 5 Units into the skin 3 (three) times daily.  Qty: 4.5 mL, Refills: 11      magnesium oxide (MAG-OX) 400 mg (241.3 mg magnesium) tablet Take 800 mg by mouth.      metOLazone (ZAROXOLYN) 5 MG tablet Take 5 mg by mouth 3 (three) times a week.      metoprolol succinate (TOPROL-XL) 25 MG 24 hr tablet Take 25 mg by mouth once daily.      NOVOLIN 70/30 U-100 INSULIN 100 unit/mL (70-30) injection Inject 55 Units into the skin 2 (two) times daily.      potassium chloride (KLOR-CON) 10 MEQ TbSR Take 10 mEq by mouth once daily.       rosuvastatin (CRESTOR) 40 MG Tab Take 40 mg by mouth every evening.      sildenafiL (VIAGRA) 100 MG tablet TAKE 1 TABLET BY MOUTH ONCE DAILY AS NEEDED FOR ERECTILE DYSFUNCTION.  Qty: 30 tablet, Refills: 0      tamsulosin (FLOMAX) 0.4 mg Cap Take 1 capsule (0.4 mg total) by mouth once daily.  Qty: 30 capsule, Refills: 11           STOP taking these medications       losartan (COZAAR) 50 MG tablet Comments:   Reason for Stopping:         traMADoL (ULTRAM) 50 mg tablet Comments:   Reason for Stopping:         valACYclovir (VALTREX) 1000 MG tablet Comments:   Reason for Stopping:                 I have seen and examined this patient within the last 30 days. My clinical findings that support the need for the home health skilled services and home bound status are the following:no   Weakness/numbness causing balance and gait disturbance due to Infection and Weakness/Debility making it taxing to leave home.  Requiring assistive device to leave home due to unsteady gait caused by  Infection and Weakness/Debility.  Mental confusion making it unsafe for patient to leave home alone due to confusion associated with age.     Diet:   cardiac diet, diabetic diet 2000 calorie, and renal diet    Labs:  N/a      Referrals/ Consults  Physical Therapy to evaluate and treat. Evaluate for home safety and equipment needs; Establish/upgrade home exercise program. Perform / instruct on therapeutic exercises, gait training, transfer training, and Range of Motion.  Occupational Therapy to evaluate and treat. Evaluate home environment for safety and equipment needs. Perform/Instruct on transfers, ADL training, ROM, and therapeutic exercises.   to evaluate for community resources/long-range planning.  Aide to provide assistance with personal care, ADLs, and vital signs.    Activities:   activity as tolerated    Nursing:   Agency to admit patient within 24 hours of hospital discharge unless specified on physician order or at  patient request    SN to complete comprehensive assessment including routine vital signs. Instruct on disease process and s/s of complications to report to MD. Review/verify medication list sent home with the patient at time of discharge  and instruct patient/caregiver as needed. Frequency may be adjusted depending on start of care date.     Skilled nurse to perform up to 3 visits PRN for symptoms related to diagnosis    Notify MD if SBP > 160 or < 90; DBP > 90 or < 50; HR > 120 or < 50; Temp > 101; O2 < 88%;       Ok to schedule additional visits based on staff availability and patient request on consecutive days within the home health episode.    When multiple disciplines ordered:    Start of Care occurs on Sunday - Wednesday schedule remaining discipline evaluations as ordered on separate consecutive days following the start of care.    Thursday SOC -schedule subsequent evaluations Friday and Monday the following week.     Friday - Saturday SOC - schedule subsequent discipline evaluations on consecutive days starting Monday of the following week.    For all post-discharge communication, lab results, vitals, and subsequent orders- please contact patient's PCP.  If unable to get ahold of PCP, and question is about blood pressure, diuresis, or arrhythmia attempt to contact cardiologist.  If unable to get ahold of PCP, and question is about dialysis, please attempt to contact nephrologist.  If unable to get ahold of PCP, and question is about antibiotics or wound care, please contact infectious disease doctor.  If unable to get ahold of PCP, and question is about oxygen titration, CPAP or BIPAP, please contact pulmonologist.   If unable to get ahold of PCP or the above physicians in a reasonable time, please contact  on-call for clarification.    Home Health Aide:  Nursing Twice weekly  Physical Therapy Three times weekly  Occupational Therapy Three times weekly  Medical Social Work Weekly  Home Health Aide Twice  weekly    Wound Care Orders  Yes    Consulted for right gluteal wound  A 72 year old male admitted 7/17/23 from home with sepsis; acute encephalopathy; skin rash; elevated troponin; DJD; anasarca; DM II with hyperglycemia; BPH; left knee pain; essential hypertension; ELIZABETH superimposed on CKD  Treatment in ED for skin rash and herpes zoster 7/12 and 7/13. Returned to ED 7/17/23 after unable to get up from chair and reported sitting in urine and feces  7/18 4:09 am 4 Eyes Skin Assessment- LDA for pressure injury previously documented - noted only pressure injuries documented were right heel on 7/17 and buttock on 7/18. Other areas of skin breakdown were described as blisters and incontinence associated dermatitis  7/18 WBC 13.52 Hgb 10.9 Hct 32.7 Alb 2.6 Weight 218 lbs   6/2 A1C 7.8  Treatment with Acyclovir daily- shingles right flank/buttock  On Isoflex mattress; Magdiel score 13; wearing EHOB boots; jackson catheter  Assessment:  Photodocumentation    Right anterior thigh(from Media)- Drying vesicles with scabs. No drainage. No surrounding erythema.     Right buttock- Eschar/slough at site of shingles. Scant serous drainage. Complaining of pain.   Legs- edematous  Feet- Callus on left and right lateral foot    Treatment:  Local wound care to wounds on right buttock and right anterior upper thigh with hydrogel bid after cleansing with Vashe.      Reconsulted for altered skin integrity- gluteal cleft- dermatitis isn't improving  7/18 WOC consult for right gluteal cleft- assessed right anterior thigh, right buttock, legs, and feet. Treatment plan developed for lesions from shingles with Vashe cleansing and hydrogel  Jackson catheter was in place to divert urine from wounds  Assessment:  Photodocumentation    Gluteal cleft/buttocks- Severely denuded area worsened with fecal and urinary incontinence. Friable, bleeding with clotting when not applying friction from cleansing. Not a pressure injury.   Currently attempting to  contain urinary incontinence with Versette external catheter + brief.    Right buttocks- Shingles lesions improving with  of necrotic roof.       Treatment:  Mepilex foam between gluteal cleft.  Hydrogel to shingles lesions after cleansing wounds with Vashe. Cleansed incontinence with wipes.      I certify that this patient is confined to her home and needs intermittent skilled nursing care, physical therapy, and occupational therapy.        Abhijeet Veloz MD  Internal Medicine Staff

## 2023-07-25 NOTE — DISCHARGE INSTRUCTIONS
Stop taking losartan  Start taking Amlodipine  Increase Hydralazine  Pain meds ordered for you (both Norco and RADHIKA)  Follow up with PCP and Nephrology  Wound care will come to your house, also PT/OT- please answer phone and door.    Thank you for trusting [facility] and me with your care.   Let me know if there is anything more I can do!!        Abhijeet Veloz MD  Internal Medicine Staff        PATIENT GENERAL DISCHARGE INFORMATION   Things that YOU are responsible for to Manage Your Care At Home:  1. Getting your prescriptions filled.  2. Taking you medications as directed. (DO NOT MISS ANY DOSES!)  3. Going to your follow-up doctor appointments.                 *This is important because it allows the doctor to monitor your progress and make changes.      If you are unable to make your follow up appointments, please call the number listed and reschedule this appointment.   After discharge, if you need assistance, you can call [facility] On Call Nurse Care Line for 24/7 assistance at [phone]  If you are experience any signs or symptoms, Call your doctor or Call [phone] and come to your nearest Emergency Room.    You should receive a call from [facility] Discharge Department within 48-72 hours to help manage your care after discharge.   Please try to make sure that you answer your phone for this important phone call.

## 2023-07-25 NOTE — CONSULTS
Reconsulted for altered skin integrity- gluteal cleft- dermatitis isn't improving  7/18 WOC consult for right gluteal cleft- assessed right anterior thigh, right buttock, legs, and feet. Treatment plan developed for lesions from shingles with Vashe cleansing and hydrogel  [place] catheter was in place to divert urine from wounds  Assessment:  Photodocumentation    Gluteal cleft/buttocks- Severely denuded area worsened with fecal and urinary incontinence. Friable, bleeding with clotting when not applying friction from cleansing. Not a pressure injury.   Currently attempting to contain urinary incontinence with Versette external catheter + brief.    Right buttocks- Shingles lesions improving with  of necrotic roof.   Treatment:  Mepilex foam between gluteal cleft.  Hydrogel to shingles lesions after cleansing wounds with Vashe.  Cleansed incontinence with wipes.   Message to MD via Secure Chat re: Status of skin and reported home situation for care of wound.

## 2023-07-25 NOTE — PROGRESS NOTES
[facility] Medicine  Progress Note    Patient Name: Chato Bowie  MRN: [id number]  Patient Class: IP- Inpatient   Admission Date: 7/17/2023  Length of Stay: 8 days  Attending Physician: Abhijeet Veloz MD  Primary Care Provider: [facility]        Subjective:     Principal Problem:Sepsis        HPI:  Mr. Bowie is a 72-year-old with extensive past medical history including CHF, arthritis, coronary artery disease, diabetes mellitus type 2, hypertension and hyperlipidemia who presents to the emergency department for evaluation of pain and swelling.  Patient was recently diagnosed with shingles and sent home with foul acyclovir.  Patient was found at home unable to get up from his chair and sitting in urine and feces.  Patient is not able to give much history in the emergency department so most of this history was obtained from medical review and providers in the emergency department.    In the emergency department, patient was found to be febrile of 100.8 with initial blood pressure 125/68 saturating 98% on room air.  Lab work revealed a leukocytosis of 17.4, acute on chronic kidney disease with creatinine 5.4  with elevated procalcitonin.  Patient underwent ultrasound of lower extremities which did not reveal DVT.  CT abdomen and pelvis did not show any acute intra-abdominal abnormalities but does note soft tissue edema in the bilateral hip regions.  Patient also found to have concerns for infection of right flank and hip wounds.  Patient admitted to hospital medicine for further management.        Overview/Hospital Course:   72-year-old with extensive past medical history including CHF, arthritis, coronary artery disease, diabetes mellitus type 2, hypertension and hyperlipidemia admitted on 7/17/2023 for sepsis likely secondary to shingles and overlying cellulitis and acute kidney injury. Stated on IV antibiotics and continued on valacyclovir for shingles treatment. Wound care  consulted. Also found to have elevated troponin and hypokalemia.  Electrolytes replaced as needed.  Suspect troponin elevation due to demand in the setting of sepsis and ELIZABETH.  ECG reviewed and read as unusual P axis. Has known LBBB. Recent echo with normal EF and grade II diastolic dysfunction.  Troponin trended down.  Patient without any chest pain or shortness of breath. ELIZABETH improving and leukocytosis resolved. PT/OT consulted-recommends SNF. Patient unable to care for himself, will likely need to transition to retirement [facility]. Patient medically clear and stable for discharge to SNF. Awaiting placement.       Interval History:  NAEON.  Complaints that he is not getting pain meds quick enough  Told him he could receive the breakthrough IV low dose dilaudid on top of oral  All questions answered and updates on care given.       ROS:  General: Negative for fevers or chills.  Cardiac: Negative for chest pain or orthopnea   Pulmonary: Negative for dyspnea or wheezing.  GI: Negative for abdominal distention or pain  +rash +myalgias/superficial pain     Vitals:    07/24/23 2328 07/24/23 2354 07/25/23 0521 07/25/23 0742   BP: (!) 167/72  131/63 (!) 157/69   BP Location: Left arm  Left arm Left arm   Patient Position: Lying  Lying Lying   Pulse: 77  74 70   Resp: 20 20 19 17   Temp: 99.6 °F (37.6 °C)  98.9 °F (37.2 °C) 98.4 °F (36.9 °C)   TempSrc: Oral  Oral Oral   SpO2: (!) 93%  (!) 93% (!) 91%   Weight:       Height:              Body mass index is 30.5 kg/m².      PHYSICAL EXAM:  GENERAL APPEARANCE: alert and cooperative, and appears to be in no acute distress.  HEENT:     HEAD: NC/AT     EYES: PERRL, EOMI.  Vision is grossly intact.  NECK: Neck supple, non-tender without LAD, masses or thyromegaly.  CARDIAC: There is no peripheral edema, cyanosis or pallor.   LUNGS: Clear to auscultation and percussion without rales or wheezing  ABDOMEN: Non-distended. No guarding.  MSK: No joint erythema or tenderness.   EXTREMITIES: No  significant deformity or joint abnormality. No edema.   NEUROLOGICAL: CN II-XII grossly intact.   SKIN: Skin normal color, texture and turgor with no lesions or eruptions.  PSYCHIATRIC: Oriented. No tangential speech. No Hyperactive features.        Recent Results (from the past 24 hour(s))   POCT glucose    Collection Time: 07/24/23 11:39 AM   Result Value Ref Range    POCT Glucose 126 (H) 70 - 110 mg/dL   Troponin I    Collection Time: 07/24/23 12:29 PM   Result Value Ref Range    Troponin I 0.034 (H) 0.000 - 0.026 ng/mL   POCT glucose    Collection Time: 07/24/23  4:55 PM   Result Value Ref Range    POCT Glucose 142 (H) 70 - 110 mg/dL   Troponin I    Collection Time: 07/24/23  6:38 PM   Result Value Ref Range    Troponin I 0.045 (H) 0.000 - 0.026 ng/mL   POCT glucose    Collection Time: 07/24/23  8:27 PM   Result Value Ref Range    POCT Glucose 181 (H) 70 - 110 mg/dL   Troponin I    Collection Time: 07/25/23 12:46 AM   Result Value Ref Range    Troponin I 0.045 (H) 0.000 - 0.026 ng/mL   POCT glucose    Collection Time: 07/25/23  7:41 AM   Result Value Ref Range    POCT Glucose 110 70 - 110 mg/dL       Microbiology Results (last 7 days)     Procedure Component Value Units Date/Time    Blood culture x two cultures. Draw prior to antibiotics. [[id number]] Collected: 07/17/23 1241    Order Status: Completed Specimen: Blood from Peripheral, Hand, Right Updated: 07/21/23 1303     Blood Culture, Routine No Growth after 4 days.    Narrative:      Aerobic and anaerobic    Blood culture x two cultures. Draw prior to antibiotics. [[id number]] Collected: 07/17/23 1239    Order Status: Completed Specimen: Blood from Peripheral, Antecubital, Left Updated: 07/21/23 1303     Blood Culture, Routine No Growth after 4 days.    Narrative:      Aerobic and anaerobic           Imaging Results          CT Head Without Contrast (Final result)  Result time 07/17/23 19:03:22    Final result by Mine Dasilva MD (07/17/23 19:03:22)                  Impression:      No acute intracranial abnormalities identified.  No significant detrimental change compared to recent CT head 06/01/2023.      Electronically signed by: Mine Dasilva MD  Date:    07/17/2023  Time:    19:03             Narrative:    EXAMINATION:  CT HEAD WITHOUT CONTRAST    CLINICAL HISTORY:  Mental status change, unknown cause;    TECHNIQUE:  Low dose axial images were obtained through the head.  Coronal and sagittal reformations were also performed. Contrast was not administered.    COMPARISON:  06/01/2023.    FINDINGS:  There is generalized cerebral volume loss with extensive chronic microvascular ischemic disease.  Small region of remote infarction with encephalomalacia is seen in the left frontal lobe.  No evidence of acute/recent major vascular distribution cerebral infarction, intraparenchymal hemorrhage, or intra-axial space occupying lesion. The ventricular system is normal in size and configuration with no evidence of hydrocephalus. No effacement of the skull-base cisterns. No abnormal extra-axial fluid collections or blood products. Visualized paranasal sinuses and mastoid air cells are clear. The calvarium shows no significant abnormality.                               X-Ray Knee 1 or 2 View Left (Final result)  Result time 07/17/23 18:34:51    Final result by Mine Dasilva MD (07/17/23 18:34:51)                 Impression:      No acute osseous abnormality identified.  Additional findings as detailed above.  No significant change.      Electronically signed by: Mine Dasilva MD  Date:    07/17/2023  Time:    18:34             Narrative:    EXAMINATION:  XR KNEE 1 OR 2 VIEW LEFT    CLINICAL HISTORY:  swelling, pain;    TECHNIQUE:  AP and lateral views of the left knee were performed.    COMPARISON:  07/05/2023.    FINDINGS:  No evidence of acute displaced fracture, dislocation, or osseous destructive process.  Severe joint space narrowing is seen at the medial tibiofemoral  compartment.  There is significant suprapatellar joint effusion.  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  Surrounding soft tissue edema is seen.                               CT Abdomen Pelvis  Without Contrast (Final result)  Result time 07/17/23 17:30:31    Final result by Mine Dasilva MD (07/17/23 17:30:31)                 Impression:      1. No acute intra-abdominal abnormalities identified.  2. Cholelithiasis with suspected gallbladder sludge.  3. Additional findings as detailed above.      Electronically signed by: Mine Dasilva MD  Date:    07/17/2023  Time:    17:30             Narrative:    EXAMINATION:  CT ABDOMEN PELVIS WITHOUT CONTRAST    CLINICAL HISTORY:  RLQ abdominal pain (Age >= 14y);    TECHNIQUE:  Low dose axial images, sagittal and coronal reformations were obtained from the lung bases to the pubic symphysis.  Oral contrast was not administered.    COMPARISON:  CT abdomen pelvis 06/26/2023.    FINDINGS:  Heart is mildly enlarged with coronary artery and aortic valve calcification seen.  The lung bases are clear.    Small calcified granulomas are seen within the liver and spleen.  There is no intra-or extrahepatic biliary ductal dilatation.  There is cholelithiasis with hyperdense layering material seen within the gallbladder which may represent hyperdense sludge.  Stomach, pancreas, and adrenal glands are unremarkable.    Kidneys show no evidence of stones or hydronephrosis. Ureters are unremarkable along their courses.  Urinary bladder is collapsed around a De Leon catheter.  Prostate is small in size or has been removed.    No evidence of appendicitis.  Moderate volume retained stool is seen within the colon.  The visualized loops of small and large bowel show no evidence of obstruction or inflammation.  No free air or free fluid.    Aorta tapers normally with mild atherosclerosis.    No acute osseous abnormality identified.  Multilevel degenerative  changes are seen within the spine.  Nonspecific subcutaneous soft tissue edema is seen involving the bilateral hip regions.                               US Lower Extremity Veins Bilateral (Final result)  Result time 07/17/23 16:31:14    Final result by Elbert Nunez MD (07/17/23 16:31:14)                 Impression:      No evidence of deep venous thrombosis in either lower extremity.      Electronically signed by: Elbert Nunez  Date:    07/17/2023  Time:    16:31             Narrative:    EXAMINATION:  US LOWER EXTREMITY VEINS BILATERAL    CLINICAL HISTORY:  Other specified soft tissue disorders    TECHNIQUE:  Duplex and color flow Doppler and dynamic compression was performed of the bilateral lower extremity veins was performed.    COMPARISON:  None    FINDINGS:  Right thigh veins: The common femoral, femoral, popliteal, upper greater saphenous, and deep femoral veins are patent and free of thrombus. The veins are normally compressible and have normal phasic flow and augmentation response.    Right calf veins: The visualized calf veins are patent.    Left thigh veins: The common femoral, femoral, popliteal, upper greater saphenous, and deep femoral veins are patent and free of thrombus. The veins are normally compressible and have normal phasic flow and augmentation response.    Left calf veins: The visualized calf veins are patent.    Miscellaneous: None                               X-Ray Chest AP Portable (Final result)  Result time 07/17/23 12:57:52    Final result by Martin Thorpe MD (07/17/23 12:57:52)                 Impression:      See above      Electronically signed by: Martin Thorpe MD  Date:    07/17/2023  Time:    12:57             Narrative:    EXAMINATION:  XR CHEST AP PORTABLE    CLINICAL HISTORY:  Sepsis;    TECHNIQUE:  Single frontal view of the chest was performed.    COMPARISON:  No 07/05/2023 ne    FINDINGS:  Heart size is normal..  Lungs are clear.  No pleural effusion                                        Assessment/Plan:      * Sepsis  This patient does have evidence of infective focus  My overall impression is sepsis.  Source: left knee vs superimposed cellulitis (shingles).  Suspect source is cellulitis at this time.  Antibiotics given-   Antibiotics (72h ago, onward)    Start     Stop Route Frequency Ordered    07/22/23 0430  piperacillin-tazobactam (ZOSYN) 4.5 g in dextrose 5 % in water (D5W) 5 % 100 mL IVPB (MB+)         -- IV Every 8 hours (non-standard times) 07/21/23 2244    07/19/23 0900  mupirocin 2 % ointment         07/24 0859 Nasl 2 times daily 07/19/23 0656        Latest lactate reviewed-  No results for input(s): LACTATE in the last 72 hours.  Organ dysfunction indicated by Acute kidney injury and Encephalopathy    Fluid challenge given 2 liters NS in ED - not hypotensive    Post- resuscitation assessment Yes Perfusion exam was performed within 6 hours of septic shock presentation after bolus shows Adequate tissue perfusion assessed by non-invasive monitoring       Will Not start Pressors  Source control achieved by: IV antibiotics    -Presented febrile, tachypneic and with leukocytosis  -Also have ELIZABETH on admission  -recently diagnosed with shingles, but patient has not started his prescription of the valacyclovir outpatient  -blood culture with no growth to date.    -continue valacyclovir for shingles and Zosyn for superimposed cellulitis. Treatment course should be completed on 07/24  -Leukocytosis resolved 07/19    Skin rash  -Diagnosed with shingles and prescribed valacyclovir outpatient   -He states that this was picked up but did not start treatment    -Continue renal dose valacyclovir as well as IV antibiotics for superimposed bacterial infection and source of sepsis.  Precautions noted.  -Wound care consult    Multimodal Pain Control:   · Increased frequency of norco to q4h on 07/23  · Low dose breakthrough 0.2 mg Dilaudid  · Scheduled Tylenol 1000 mg TID  · Low dose lindsay  100 mg BID    Falls  -PT/OT consulted: recommends SNF  -Medically clear for discharge to SNF. Awaiting placement     Acute encephalopathy  Patient appears encephalopathic and different from his usual baseline on admission.  His BUN is 114 so uremic encephalopathy is on differential as well as sepsis.  Suspect these are etiology but will CT head for completeness.  - Ct head: No acute intracranial abnormalities identified.  No significant detrimental change compared to recent CT head 06/01/2023.   - AOx4, mental status normal and at baseline on 07/18  -Resolved      Elevated troponin  -Presents with sepsis and found to have elevated troponin of 0.183.   -Denies chest pain. ECG reviewed and read as unusual P axis. Has known LBBB.   -Recent echo with normal EF and grade II diastolic dysfunction.   -Suspect this is demand in setting of sepsis.   -trend troponin, downtrend      DJD (degenerative joint disease)  -Chronic issues, particularly left knee.  -XR of left knee: No evidence of acute displaced fracture, dislocation, or osseous destructive process.  Severe joint space narrowing is seen at the medial tibiofemoral compartment.  There is significant suprapatellar joint effusion      Class 1 obesity due to excess calories with serious comorbidity and body mass index (BMI) of 30.0 to 30.9 in adult  Body mass index is 30.5 kg/m². Morbid obesity complicates all aspects of disease management from diagnostic modalities to treatment. Weight loss encouraged and health benefits explained to patient.         Anasarca  This is chronic though patient appears volume depleted in the setting of sepsis.    Continue to monitor.      Type 2 diabetes mellitus with hyperglycemia  Patient's FSGs are controlled on current medication regimen.  Last A1c reviewed-   Lab Results   Component Value Date    HGBA1C 7.8 (H) 06/02/2023     Most recent fingerstick glucose reviewed-   Recent Labs   Lab 07/23/23  1630 07/23/23  2048 07/24/23  0801  07/24/23  1139   POCTGLUCOSE 189* 213* 108 126*     Current correctional scale  Low  Maintain anti-hyperglycemic dose as follows-   Antihyperglycemics (From admission, onward)    Start     Stop Route Frequency Ordered    07/17/23 1738  insulin aspart U-100 pen 0-5 Units         -- SubQ Before meals & nightly PRN 07/17/23 1641        Hold Oral hypoglycemics while patient is in the hospital.    BPH (benign prostatic hyperplasia)  De Leon in place, resume Flomax  Voiding trial on 07/20      Left knee pain  -This is chronic, left knee appears swollen but did not appear erythematous or warm.    -XR left knee: No evidence of acute displaced fracture, dislocation, or osseous destructive process.  Severe joint space narrowing is seen at the medial tibiofemoral compartment.  There is significant suprapatellar joint effusion      Hypokalemia  -Replace as needed  -Resolved on 07/22      Essential hypertension  -blood pressure elevated, patient is stable   -resume home hydralazine, metoprolol succinate, and amiodarone  -Increased home hydralazine to 100mg TID on 07/20  -BP still elevated, add norvasc 10mg on 07/22  -IV hydralazine PRN for SBP>180    Acute kidney injury superimposed on chronic kidney disease  -Presents with  and creatinine of 5.4 with baseline of 2.5-2.8.    -BNP 99 when usually patient has BNP greater than 300.    -Likely volume depletion in the setting of sepsis and poor p.o. intake.    -He was given 2 L normal saline in the emergency department.  Currently with good urine output.    -Renal US normal  -Continue to monitor, if no improvement plans for Nephrology consult  -Cr improved, at baseline       VTE Risk Mitigation (From admission, onward)         Ordered     apixaban tablet 5 mg  2 times daily         07/17/23 1641     IP VTE HIGH RISK PATIENT  Once         07/17/23 1641     Place sequential compression device  Until discontinued         07/17/23 1641                Discharge Planning   ELY:  7/21/2023     Code Status: Full Code   Is the patient medically ready for discharge?: Yes    Reason for patient still in hospital (select all that apply): Pending disposition  Discharge Plan A: Skilled Nursing Facility   Discharge Delays: (!) Post-Acute Set-up              Abhijeet Veloz MD  Department of Hospital Medicine   [facility]

## 2023-07-25 NOTE — PT/OT/SLP PROGRESS
Occupational Therapy   Treatment    Name: Chato Bowie  MRN: [id number]  Admitting Diagnosis:  Sepsis       Recommendations:     Discharge Recommendations: nursing facility, skilled  Discharge Equipment Recommendations:  bedside commode  Barriers to discharge:   (generalized weakness and requires assist to amb and perform self care. The patient is a fall risk with multiple readmit to this hospital)    Assessment:     Chato Bowie is a 72 y.o. male with a medical diagnosis of Sepsis.   Performance deficits affecting function are weakness, impaired endurance, impaired self care skills, impaired functional mobility, impaired balance, gait instability, decreased coordination, decreased upper extremity function, decreased lower extremity function, decreased safety awareness, pain, decreased ROM, impaired skin, edema.     The patient tolerated sitting in the chair ~3 hours. The patient required CGA/min assist to stand from the chair and amb with a RW around the bed. The patient is limited by weakness and right hip pain at wound site.  The patient requires assist with all self care and functional mobility and is not safe to D/C home alone.     Rehab Prognosis:  Fair; patient would benefit from acute skilled OT services to address these deficits and reach maximum level of function.       Plan:     Patient to be seen 5 x/week to address the above listed problems via self-care/home management, therapeutic activities, therapeutic exercises  Plan of Care Expires: 09/01/23  Plan of Care Reviewed with: patient    Subjective     Chief Complaint: unable to go home alone  Patient/Family Comments/goals: wants to go to SNF  Pain/Comfort:  Pain Rating 1:  (yes-did not rate)  Location - Side 1: Right  Location 1: hip (buttock)  Pain Addressed 1: Cessation of Activity, Reposition, Distraction    Objective:     Communicated with: nurseDalia prior to session.  Patient found up in chair with telemetry, Condom Catheter upon OT entry to  room.    General Precautions: Standard, diabetic, fall, airborne, contact    Orthopedic Precautions:N/A  Braces: N/A  Respiratory Status: Room air     Occupational Performance:     Bed Mobility:    Patient completed Scooting/Bridging with stand by assistance  Patient completed Sit to Supine with contact guard assistance, minimum assistance, and with leg lift     Functional Mobility/Transfers:  Patient completed Sit <> Stand Transfer with minimum assistance  with  rolling walker and verbal cues for hand placement on the RW and transfer surface   Functional Mobility: The patient amb using a RW around the bed !15' with CGA and VC    Activities of Daily Living:  N/T      AMPAC 6 Click ADL: 14    Treatment & Education:  The patient performed self care and functional mobility as noted above  The patient was educated re: UE therex/HEP using red theraband  Pt completed  x 10 BUE HEP with red theraband in seated position:  Shoulder horizontal ab/dduction   External rotation  Shoulder flexion/extension with ab/dduction(CGA was needed to achieve full shoulder ROM)  Elbow extension  Handout placed on the table with theraband   The patient was educated re: need to reposition in bed on the left or right side for pressure relief. The patient was positioned on his right side with a pillow behind his back and between his knees    Patient left right sidelying with all lines intact and call button in reach    GOALS:   Multidisciplinary Problems       Occupational Therapy Goals          Problem: Occupational Therapy    Goal Priority Disciplines Outcome Interventions   Occupational Therapy Goal     OT, PT/OT Ongoing, Progressing    Description: Goals to be met by: 8/1/23     Patient will increase functional independence with ADLs by performing:    Feeding with [place].  UE Dressing with Modified [place].  LE Dressing with Modified [place] and Supervision.  Grooming while standing at sink with Contact Guard Assistance and  Assistive Devices as needed.  Toileting from bedside commode with Set-up Assistance and Supervision for hygiene and clothing management.   Rolling to Bilateral with Modified [place].   Supine to sit with Modified [place] and Supervision.  Step transfer with Contact Guard Assistance  Toilet transfer to bedside commode with Contact Guard Assistance.  Upper extremity exercise program x15 reps per handout, with assistance as needed.                         Time Tracking:     OT Date of Treatment: 07/25/23  OT Start Time: 1437  OT Stop Time: 1509  OT Total Time (min): 32 min    Billable Minutes:Therapeutic Activity 15  Therapeutic Exercise 17  Total Time 32    OT/JASON: OT          7/25/2023

## 2023-07-25 NOTE — PT/OT/SLP PROGRESS
Physical Therapy Treatment    Patient Name:  Chato Bowie   MRN:  [id number]    Recommendations:     Discharge Recommendations: nursing facility, skilled  Discharge Equipment Recommendations:  (per SNF)  Barriers to discharge:   Shingles, Pt is not functioning at baseline and is at increased risk for falls and readmission if he returns home at present time    Assessment:     Chato Bowie is a 72 y.o. male admitted with a medical diagnosis of Sepsis.  He presents with the following impairments/functional limitations: weakness, impaired endurance, impaired functional mobility, gait instability, impaired balance, impaired self care skills, decreased coordination, decreased upper extremity function, decreased lower extremity function, decreased safety awareness, pain, decreased ROM, impaired skin, edema.    Rehab Prognosis: Good; patient would benefit from acute skilled PT services to address these deficits and reach maximum level of function.    Recent Surgery: * No surgery found *      Plan:     During this hospitalization, patient to be seen  (3-5x/wk) to address the identified rehab impairments via gait training, therapeutic activities, therapeutic exercises, neuromuscular re-education, wheelchair management/training and progress toward the following goals:    Plan of Care Expires:  08/01/23    Subjective     Chief Complaint: pain   Patient/Family Comments/goals: Pt agreed to sit [facility] post treatment.  Pain/Comfort:  Pain Rating 1: 10/10  Location - Side 1: Right  Location - Orientation 1: lateral  Location 1: hip  Pain Addressed 1: Pre-medicate for activity, Reposition, Distraction, Cessation of Activity, Nurse notified  Pain Rating Post-Intervention 1:  (pt did not rate)      Objective:     Communicated with nurse Gómez prior to session.  Patient found HOB elevated with telemetry, Condom Catheter upon PT entry to room.     General Precautions: Standard, fall, diabetic, airborne, contact  Orthopedic Precautions:  N/A  Braces: N/A  Respiratory Status: Room air     Functional Mobility:  Bed Mobility:     Scooting: CGA  Supine to Sit: minium assistance for Trunk and BLE management with HOB elevated; v/c for   Transfers:  Gait belt and gripped socks donned prior OOB activity   Sit to Stand: from EOB with Min A with rolling walker  Gait: Pt ambulated ~14 ft with CGA  using RW. Pt with flexed posture, decreased fanta/step length/weight shifting/foot off floor clearance; v/t cues for upright posture, increase step length, clear foot off floor, weight shifting, AD management. Pt declined for further ambulation despite Max encouragement 2* fatigue.   Balance: Fair+ Sitting; Fair Standing      AM-PAC 6 CLICK MOBILITY  Turning over in bed (including adjusting bedclothes, sheets and blankets)?: 3  Sitting down on and standing up from a chair with arms (e.g., wheelchair, bedside commode, etc.): 3  Moving from lying on back to sitting on the side of the bed?: 3  Moving to and from a bed to a chair (including a wheelchair)?: 3  Need to walk in hospital room?: 3  Climbing 3-5 steps with a railing?: 1  Basic Mobility Total Score: 16       Treatment & Education:  Re-encouraged/re-educated pt on importance of Bed Mobility and performing BLE ex throughout the day, pt verbalized understanding.     BLE ex in seated 10 reps AP, LAQ, PS, Marching (having pt counting number out loud)    Patient left up in chair on pillow with tray table in front, all lines intact, call button in reach, and nurse notified.    GOALS:   Multidisciplinary Problems       Physical Therapy Goals          Problem: Physical Therapy    Goal Priority Disciplines Outcome Goal Variances Interventions   Physical Therapy Goal     PT, PT/OT Ongoing, Progressing     Description: Goals to be met by: 23     Patient will increase functional independence with mobility by performin. Supine to sit with Modified [place]  2. Rolling to Left and Right with  Modified [place].  3. Sit to stand transfer with Modified [place]  4. Bed to chair transfer with Modified [place]    5. Gait  x 5-10 feet with Modified [place] using Rolling Walker.   6. Wheelchair propulsion x50-100 feet with Modified [place]   7. Lower extremity exercise program x10 reps per handout, with independence                         Time Tracking:     PT Received On: 07/25/23  PT Start Time: 1150     PT Stop Time: 1230  PT Total Time (min): 40 min     Billable Minutes: Gait Training 14 min, Therapeutic Activity 8 min, and Therapeutic Exercise 18 min    Treatment Type: Treatment  PT/PTA: PTA     Number of PTA visits since last PT visit: 1 07/25/2023

## 2023-07-27 ENCOUNTER — HOSPITAL ENCOUNTER (INPATIENT)
Facility: HOSPITAL | Age: 73
LOS: 17 days | DRG: 291 | End: 2023-08-16
Attending: EMERGENCY MEDICINE | Admitting: HOSPITALIST
Payer: MEDICARE

## 2023-07-27 DIAGNOSIS — G62.9 POLYNEUROPATHY: ICD-10-CM

## 2023-07-27 DIAGNOSIS — N18.9 ACUTE KIDNEY INJURY SUPERIMPOSED ON CHRONIC KIDNEY DISEASE: ICD-10-CM

## 2023-07-27 DIAGNOSIS — N17.9 ACUTE KIDNEY INJURY SUPERIMPOSED ON CHRONIC KIDNEY DISEASE: ICD-10-CM

## 2023-07-27 DIAGNOSIS — R07.9 CHEST PAIN: ICD-10-CM

## 2023-07-27 DIAGNOSIS — M79.89 LOCALIZED SWELLING OF BOTH LOWER EXTREMITIES: ICD-10-CM

## 2023-07-27 DIAGNOSIS — U07.1 COVID-19 VIRUS DETECTED: ICD-10-CM

## 2023-07-27 DIAGNOSIS — U07.1 COVID-19 VIRUS INFECTION: Primary | ICD-10-CM

## 2023-07-27 PROBLEM — R55 SYNCOPE: Status: RESOLVED | Noted: 2021-05-24 | Resolved: 2023-07-27

## 2023-07-27 PROBLEM — R00.1 BRADYCARDIA: Status: RESOLVED | Noted: 2023-01-05 | Resolved: 2023-07-27

## 2023-07-27 LAB
ALBUMIN SERPL BCP-MCNC: 2.3 G/DL (ref 3.5–5.2)
ALP SERPL-CCNC: 66 U/L (ref 55–135)
ALT SERPL W/O P-5'-P-CCNC: 22 U/L (ref 10–44)
ANION GAP SERPL CALC-SCNC: 12 MMOL/L (ref 8–16)
AST SERPL-CCNC: 32 U/L (ref 10–40)
BACTERIA #/AREA URNS HPF: ABNORMAL /HPF
BASOPHILS # BLD AUTO: 0.02 K/UL (ref 0–0.2)
BASOPHILS NFR BLD: 0.3 % (ref 0–1.9)
BILIRUB SERPL-MCNC: 0.5 MG/DL (ref 0.1–1)
BILIRUB UR QL STRIP: NEGATIVE
BNP SERPL-MCNC: 487 PG/ML (ref 0–99)
BUN SERPL-MCNC: 46 MG/DL (ref 8–23)
CALCIUM SERPL-MCNC: 9 MG/DL (ref 8.7–10.5)
CHLORIDE SERPL-SCNC: 98 MMOL/L (ref 95–110)
CLARITY UR: CLEAR
CO2 SERPL-SCNC: 23 MMOL/L (ref 23–29)
COLOR UR: YELLOW
CREAT SERPL-MCNC: 2.7 MG/DL (ref 0.5–1.4)
CTP QC/QA: YES
CTP QC/QA: YES
DIFFERENTIAL METHOD: ABNORMAL
EOSINOPHIL # BLD AUTO: 0.2 K/UL (ref 0–0.5)
EOSINOPHIL NFR BLD: 2.9 % (ref 0–8)
ERYTHROCYTE [DISTWIDTH] IN BLOOD BY AUTOMATED COUNT: 14 % (ref 11.5–14.5)
EST. GFR  (NO RACE VARIABLE): 24 ML/MIN/1.73 M^2
GLUCOSE SERPL-MCNC: 136 MG/DL (ref 70–110)
GLUCOSE UR QL STRIP: NEGATIVE
GRAN CASTS #/AREA URNS LPF: 1 /LPF
HCT VFR BLD AUTO: 25.2 % (ref 40–54)
HGB BLD-MCNC: 8.2 G/DL (ref 14–18)
HGB UR QL STRIP: ABNORMAL
HYALINE CASTS #/AREA URNS LPF: 1 /LPF
IMM GRANULOCYTES # BLD AUTO: 0.04 K/UL (ref 0–0.04)
IMM GRANULOCYTES NFR BLD AUTO: 0.7 % (ref 0–0.5)
KETONES UR QL STRIP: NEGATIVE
LEUKOCYTE ESTERASE UR QL STRIP: NEGATIVE
LYMPHOCYTES # BLD AUTO: 0.7 K/UL (ref 1–4.8)
LYMPHOCYTES NFR BLD: 11.6 % (ref 18–48)
MCH RBC QN AUTO: 26.6 PG (ref 27–31)
MCHC RBC AUTO-ENTMCNC: 32.5 G/DL (ref 32–36)
MCV RBC AUTO: 82 FL (ref 82–98)
MICROSCOPIC COMMENT: ABNORMAL
MONOCYTES # BLD AUTO: 0.7 K/UL (ref 0.3–1)
MONOCYTES NFR BLD: 12.1 % (ref 4–15)
NEUTROPHILS # BLD AUTO: 4.3 K/UL (ref 1.8–7.7)
NEUTROPHILS NFR BLD: 72.4 % (ref 38–73)
NITRITE UR QL STRIP: NEGATIVE
NRBC BLD-RTO: 0 /100 WBC
PH UR STRIP: 5 [PH] (ref 5–8)
PLATELET # BLD AUTO: 200 K/UL (ref 150–450)
PMV BLD AUTO: 9 FL (ref 9.2–12.9)
POC MOLECULAR INFLUENZA A AGN: NEGATIVE
POC MOLECULAR INFLUENZA B AGN: NEGATIVE
POTASSIUM SERPL-SCNC: ABNORMAL MMOL/L (ref 3.5–5.1)
PROT SERPL-MCNC: 7.4 G/DL (ref 6–8.4)
PROT UR QL STRIP: ABNORMAL
RBC # BLD AUTO: 3.08 M/UL (ref 4.6–6.2)
RBC #/AREA URNS HPF: 7 /HPF (ref 0–4)
SARS-COV-2 RDRP RESP QL NAA+PROBE: POSITIVE
SODIUM SERPL-SCNC: 133 MMOL/L (ref 136–145)
SP GR UR STRIP: 1.01 (ref 1–1.03)
SQUAMOUS #/AREA URNS HPF: 2 /HPF
TSH SERPL DL<=0.005 MIU/L-ACNC: 2.6 UIU/ML (ref 0.4–4)
URN SPEC COLLECT METH UR: ABNORMAL
UROBILINOGEN UR STRIP-ACNC: NEGATIVE EU/DL
WBC # BLD AUTO: 5.93 K/UL (ref 3.9–12.7)
WBC #/AREA URNS HPF: 1 /HPF (ref 0–5)

## 2023-07-27 PROCEDURE — 85025 COMPLETE CBC W/AUTO DIFF WBC: CPT | Performed by: PHYSICIAN ASSISTANT

## 2023-07-27 PROCEDURE — 87635 SARS-COV-2 COVID-19 AMP PRB: CPT | Performed by: EMERGENCY MEDICINE

## 2023-07-27 PROCEDURE — 87502 INFLUENZA DNA AMP PROBE: CPT

## 2023-07-27 PROCEDURE — 83880 ASSAY OF NATRIURETIC PEPTIDE: CPT | Performed by: PHYSICIAN ASSISTANT

## 2023-07-27 PROCEDURE — 93010 ELECTROCARDIOGRAM REPORT: CPT | Mod: ,,, | Performed by: INTERNAL MEDICINE

## 2023-07-27 PROCEDURE — 96374 THER/PROPH/DIAG INJ IV PUSH: CPT

## 2023-07-27 PROCEDURE — 93005 ELECTROCARDIOGRAM TRACING: CPT

## 2023-07-27 PROCEDURE — G0378 HOSPITAL OBSERVATION PER HR: HCPCS

## 2023-07-27 PROCEDURE — 93010 EKG 12-LEAD: ICD-10-PCS | Mod: ,,, | Performed by: INTERNAL MEDICINE

## 2023-07-27 PROCEDURE — 84443 ASSAY THYROID STIM HORMONE: CPT | Performed by: PHYSICIAN ASSISTANT

## 2023-07-27 PROCEDURE — 81000 URINALYSIS NONAUTO W/SCOPE: CPT | Performed by: PHYSICIAN ASSISTANT

## 2023-07-27 PROCEDURE — 80053 COMPREHEN METABOLIC PANEL: CPT | Performed by: PHYSICIAN ASSISTANT

## 2023-07-27 PROCEDURE — 63600175 PHARM REV CODE 636 W HCPCS: Performed by: EMERGENCY MEDICINE

## 2023-07-27 RX ORDER — FUROSEMIDE 10 MG/ML
60 INJECTION INTRAMUSCULAR; INTRAVENOUS
Status: COMPLETED | OUTPATIENT
Start: 2023-07-27 | End: 2023-07-27

## 2023-07-27 RX ORDER — INSULIN ASPART 100 [IU]/ML
27 INJECTION, SUSPENSION SUBCUTANEOUS 2 TIMES DAILY WITH MEALS
Status: DISCONTINUED | OUTPATIENT
Start: 2023-07-28 | End: 2023-07-29

## 2023-07-27 RX ORDER — NALOXONE HCL 0.4 MG/ML
0.02 VIAL (ML) INJECTION
Status: DISCONTINUED | OUTPATIENT
Start: 2023-07-28 | End: 2023-08-16 | Stop reason: HOSPADM

## 2023-07-27 RX ORDER — POLYETHYLENE GLYCOL 3350 17 G/17G
17 POWDER, FOR SOLUTION ORAL DAILY
Status: DISCONTINUED | OUTPATIENT
Start: 2023-07-28 | End: 2023-08-16 | Stop reason: HOSPADM

## 2023-07-27 RX ORDER — ONDANSETRON 8 MG/1
8 TABLET, ORALLY DISINTEGRATING ORAL EVERY 8 HOURS PRN
Status: DISCONTINUED | OUTPATIENT
Start: 2023-07-28 | End: 2023-08-16 | Stop reason: HOSPADM

## 2023-07-27 RX ORDER — METOPROLOL SUCCINATE 25 MG/1
25 TABLET, EXTENDED RELEASE ORAL DAILY
Status: DISCONTINUED | OUTPATIENT
Start: 2023-07-28 | End: 2023-08-16 | Stop reason: HOSPADM

## 2023-07-27 RX ORDER — PROCHLORPERAZINE EDISYLATE 5 MG/ML
5 INJECTION INTRAMUSCULAR; INTRAVENOUS EVERY 6 HOURS PRN
Status: DISCONTINUED | OUTPATIENT
Start: 2023-07-28 | End: 2023-08-16 | Stop reason: HOSPADM

## 2023-07-27 RX ORDER — INSULIN ASPART 100 [IU]/ML
1-10 INJECTION, SOLUTION INTRAVENOUS; SUBCUTANEOUS
Status: DISCONTINUED | OUTPATIENT
Start: 2023-07-28 | End: 2023-08-16 | Stop reason: HOSPADM

## 2023-07-27 RX ORDER — IBUPROFEN 200 MG
16 TABLET ORAL
Status: DISCONTINUED | OUTPATIENT
Start: 2023-07-28 | End: 2023-08-16 | Stop reason: HOSPADM

## 2023-07-27 RX ORDER — IBUPROFEN 200 MG
24 TABLET ORAL
Status: DISCONTINUED | OUTPATIENT
Start: 2023-07-28 | End: 2023-08-16 | Stop reason: HOSPADM

## 2023-07-27 RX ORDER — HYDRALAZINE HYDROCHLORIDE 25 MG/1
100 TABLET, FILM COATED ORAL EVERY 8 HOURS
Status: DISCONTINUED | OUTPATIENT
Start: 2023-07-28 | End: 2023-07-27

## 2023-07-27 RX ORDER — TALC
6 POWDER (GRAM) TOPICAL NIGHTLY PRN
Status: DISCONTINUED | OUTPATIENT
Start: 2023-07-28 | End: 2023-08-16 | Stop reason: HOSPADM

## 2023-07-27 RX ORDER — TAMSULOSIN HYDROCHLORIDE 0.4 MG/1
0.4 CAPSULE ORAL DAILY
Status: DISCONTINUED | OUTPATIENT
Start: 2023-07-28 | End: 2023-08-16 | Stop reason: HOSPADM

## 2023-07-27 RX ORDER — AMIODARONE HYDROCHLORIDE 200 MG/1
200 TABLET ORAL DAILY
Status: DISCONTINUED | OUTPATIENT
Start: 2023-07-28 | End: 2023-08-16 | Stop reason: HOSPADM

## 2023-07-27 RX ORDER — AMLODIPINE BESYLATE 5 MG/1
10 TABLET ORAL DAILY
Status: DISCONTINUED | OUTPATIENT
Start: 2023-07-28 | End: 2023-08-16 | Stop reason: HOSPADM

## 2023-07-27 RX ORDER — HYDRALAZINE HYDROCHLORIDE 20 MG/ML
10 INJECTION INTRAMUSCULAR; INTRAVENOUS EVERY 8 HOURS PRN
Status: DISCONTINUED | OUTPATIENT
Start: 2023-07-28 | End: 2023-08-16 | Stop reason: HOSPADM

## 2023-07-27 RX ORDER — ATORVASTATIN CALCIUM 40 MG/1
80 TABLET, FILM COATED ORAL DAILY
Status: DISCONTINUED | OUTPATIENT
Start: 2023-07-28 | End: 2023-08-16 | Stop reason: HOSPADM

## 2023-07-27 RX ORDER — GABAPENTIN 100 MG/1
100 CAPSULE ORAL 2 TIMES DAILY
Status: DISCONTINUED | OUTPATIENT
Start: 2023-07-28 | End: 2023-07-28

## 2023-07-27 RX ORDER — MAG HYDROX/ALUMINUM HYD/SIMETH 200-200-20
30 SUSPENSION, ORAL (FINAL DOSE FORM) ORAL 4 TIMES DAILY PRN
Status: DISCONTINUED | OUTPATIENT
Start: 2023-07-28 | End: 2023-08-16 | Stop reason: HOSPADM

## 2023-07-27 RX ORDER — GLUCAGON 1 MG
1 KIT INJECTION
Status: DISCONTINUED | OUTPATIENT
Start: 2023-07-28 | End: 2023-08-16 | Stop reason: HOSPADM

## 2023-07-27 RX ORDER — ACETAMINOPHEN 325 MG/1
650 TABLET ORAL EVERY 4 HOURS PRN
Status: DISCONTINUED | OUTPATIENT
Start: 2023-07-28 | End: 2023-08-16 | Stop reason: HOSPADM

## 2023-07-27 RX ADMIN — FUROSEMIDE 60 MG: 10 INJECTION, SOLUTION INTRAMUSCULAR; INTRAVENOUS at 11:07

## 2023-07-27 NOTE — ED PROVIDER NOTES
-----------------------------------------------------------------------------------------------------  I, Braeden Reddy, have reviewed the SORT/triage note.    History taken by patient and niece:  Who are reliable and independent historian    History     Chief Complaint   Patient presents with    Leg Swelling     Pt BIB EMS from home for leg swelling. Pt has chronic bilateral swelling, and pain, and a wound to his back.        HPI: 72 y.o. male PMHx of CHF, CAD, DM, HTN, HLD, rhabdomyolysis, who presents to the ED via EMS for evaluation of bilateral lower extremity swelling beginning a few weeks ago. History provided by independent historian, EMS, who reports patient was recently discharged from the ED yesterday for similar complaints, endorsing the patient had endorsed no relief to his complaints upon discharge. EMS further endorses patient's vitals were WNL. Patient currently reports complaints of BLE swelling with associated pain. He states his complaints have been ongoing for a few weeks, but had worsened as of recent. He additionally reports he last had similar complaints a month ago, noting he had to be admitted for a couple of nights. He endorses compliance with antihypertensives and diuretics. He reports following with his PCP at [facility], but reports he does not know who is his cardiologist. Denies fever, shortness of breath, or cough.     Past Medical History:   Diagnosis Date    Acute congestive heart failure 3/20/2020    Alcohol abuse     Arthritis     Asthma attack 11/24/2021    Coronary artery disease     Diabetes mellitus     Hyperlipemia     Hypertension     Multiple thyroid nodules 6/14/2023    Rhabdomyolysis      Past Surgical History:   Procedure Laterality Date    EYE SURGERY      TRANSESOPHAGEAL ECHOCARDIOGRAM WITH POSSIBLE CARDIOVERSION (MARIA W/ POSS CARDIOVERSION) N/A 1/5/2023    Procedure: Transesophageal echo (MARIA) intra-procedure log documentation;  Surgeon: Indra Foote MD;   Location: [facility] CATH LAB;  Service: Cardiology;  Laterality: N/A;    TREATMENT OF CARDIAC ARRHYTHMIA N/A 12/7/2020    Procedure: Cardioversion or Defibrillation;  Surgeon: Indra Foote MD;  Location: [facility] CATH LAB;  Service: Cardiology;  Laterality: N/A;    TREATMENT OF CARDIAC ARRHYTHMIA N/A 12/30/2020    Procedure: Cardioversion or Defibrillation;  Surgeon: Tyrone Steen MD;  Location: [facility] CATH LAB;  Service: Cardiology;  Laterality: N/A;    TREATMENT OF CARDIAC ARRHYTHMIA N/A 1/5/2023    Procedure: Cardioversion or Defibrillation;  Surgeon: Indra Foote MD;  Location: [facility] CATH LAB;  Service: Cardiology;  Laterality: N/A;    VASCULAR SURGERY       Family History   Problem Relation Age of Onset    Hypertension Mother     Diabetes Mother     Diabetes Father     Hypertension Father     Diabetes Sister     Hypertension Sister      Social History     Tobacco Use    Smoking status: Never    Smokeless tobacco: Never   Substance Use Topics    Alcohol use: Not Currently     Alcohol/week: 6.0 standard drinks     Types: 6 Cans of beer per week     Comment: daily    Drug use: No       Review of patient's allergies indicates:  No Known Allergies    MEDICATION (includes but may not be exclusive to:)        albuterol (PROVENTIL/VENTOLIN HFA) 90 mcg/actuation inhaler, Inhale 2 puffs into the lungs every 6 (six) hours as needed for Wheezing or Shortness of Breath., Disp: 8.5 g, Rfl: 0    amiodarone (PACERONE) 200 MG Tab, Take 200 mg by mouth once daily., Disp: , Rfl:     amLODIPine (NORVASC) 10 MG tablet, Take 1 tablet (10 mg total) by mouth once daily., Disp: 90 tablet, Rfl: 3    diclofenac sodium (VOLTAREN) 1 % Gel, Apply 2 g topically 2 (two) times daily as needed (pain)., Disp: 100 g, Rfl: 0    ELIQUIS 5 mg Tab, Take 5 mg by mouth 2 (two) times daily., Disp: , Rfl:     furosemide (LASIX) 40 MG tablet, Take 1 tablet (40 mg total) by mouth 2 (two) times daily., Disp: , Rfl:     gabapentin (NEURONTIN) 100 MG  capsule, Take 1 capsule (100 mg total) by mouth 2 (two) times daily., Disp: 60 capsule, Rfl: 0    hydrALAZINE (APRESOLINE) 100 MG tablet, Take 1 tablet (100 mg total) by mouth every 8 (eight) hours., Disp: 270 tablet, Rfl: 3    HYDROcodone-acetaminophen (NORCO)  mg per tablet, Take 1 to 2 tablets by mouth every 4 (four) hours as needed for Pain., Disp: 60 tablet, Rfl: 0    insulin aspart U-100 (NOVOLOG) 100 unit/mL (3 mL) InPn pen, Inject 5 Units into the skin 3 (three) times daily., Disp: 4.5 mL, Rfl: 11    magnesium oxide (MAG-OX) 400 mg (241.3 mg magnesium) tablet, Take 800 mg by mouth., Disp: , Rfl:     metOLazone (ZAROXOLYN) 5 MG tablet, Take 5 mg by mouth 3 (three) times a week., Disp: , Rfl:     metoprolol succinate (TOPROL-XL) 25 MG 24 hr tablet, Take 25 mg by mouth once daily., Disp: , Rfl:     NOVOLIN 70/30 U-100 INSULIN 100 unit/mL (70-30) injection, Inject 55 Units into the skin 2 (two) times daily., Disp: , Rfl:     potassium chloride (KLOR-CON) 10 MEQ TbSR, Take 10 mEq by mouth once daily., Disp: , Rfl:     rosuvastatin (CRESTOR) 40 MG Tab, Take 40 mg by mouth every evening., Disp: , Rfl:     sildenafiL (VIAGRA) 100 MG tablet, TAKE 1 TABLET BY MOUTH ONCE DAILY AS NEEDED FOR ERECTILE DYSFUNCTION., Disp: 30 tablet, Rfl: 0    tamsulosin (FLOMAX) 0.4 mg Cap, Take 1 capsule (0.4 mg total) by mouth once daily., Disp: 30 capsule, Rfl: 11       Review of Systems as per HPI  ROS  Respiratory: Negative for apnea, choking, chest tightness and wheezing.    Genitourinary: Negative for dysuria, flank pain, frequency and hematuria.   Skin: Negative for color change, pallor, rash and wound.   Cardiovascular: Positive for leg swelling.   Musculoskeletal: Positive for myalgias (BLE).  Psychiatric/Behavioral: Negative for agitation, behavioral problems, confusion, decreased concentration and dysphoric mood.     All other systems reviewed and are negative.    Physical Exam     ED Triage Vitals [07/27/23 1650]   Enc  "Vitals Group      /64      Pulse 74      Resp 18      Temp 97.9 °F (36.6 °C)      Temp Source Oral      SpO2 95 %      Weight 218 lb      Height 5' 11"      Head Circumference       Peak Flow       Pain Score       Pain Loc       Pain Edu?       Excl. in GC?         PHYSICAL EXAMINATION:  Vitals and nursing note reviewed.  Relatively normal vitals  GENERAL: NAD, alert and O x 3, well-developed, well-nourished. Anasarca present. Mildly disheveled.   HEENT:  PERRLA, EOMI, moist membranes, nl conjunctiva, no scleral icterus, no nystagmus, no nodes/nodules, soft, supple, FROM, no trachial deviation, new show rhinorrhea  CV:   RRR no m/r/g, 2+ radial pulses, <2sec cap refill, no obvious JVD  RESP:  CTA B, no w/r/r, equal and bilateral chest rise, no respiratory distress  ABD:   soft, Nontender, positive distention, +BS, no guarding/rebound  :   Normal external genitalia for a male  EXT:   FROM, BULLARD x 4, no calf tenderness, no bony tenderness, no warmth or redness, no crepitus, no obvious deformity  LYMPH:  no gross adenopathy  NEURO:  GCS 15, CN II-XII grossly intact, no obvious motor/sensory deficit, no tremor  PSYCH:   no SI/HI, no anxiety, nl mood/affect, nl judgement/thought process  SKIN:  Warm, dry, intact, no rashes/lesions or masses, nl color, no pallor         Tests     Labs Reviewed   CBC W/ AUTO DIFFERENTIAL - Abnormal; Notable for the following components:       Result Value    RBC 3.08 (*)     Hemoglobin 8.2 (*)     Hematocrit 25.2 (*)     MCH 26.6 (*)     MPV 9.0 (*)     Immature Granulocytes 0.7 (*)     Lymph # 0.7 (*)     Lymph % 11.6 (*)     All other components within normal limits   COMPREHENSIVE METABOLIC PANEL - Abnormal; Notable for the following components:    Sodium 133 (*)     Glucose 136 (*)     BUN 46 (*)     Creatinine 2.7 (*)     Albumin 2.3 (*)     eGFR 24 (*)     All other components within normal limits   B-TYPE NATRIURETIC PEPTIDE - Abnormal; Notable for the following components: "     (*)     All other components within normal limits   URINALYSIS, REFLEX TO URINE CULTURE - Abnormal; Notable for the following components:    Protein, UA 1+ (*)     Occult Blood UA 2+ (*)     All other components within normal limits    Narrative:     Specimen Source->Urine   URINALYSIS MICROSCOPIC - Abnormal; Notable for the following components:    RBC, UA 7 (*)     Bacteria Few (*)     Granular Casts, UA 1 (*)     All other components within normal limits    Narrative:     Specimen Source->Urine   FERRITIN - Abnormal; Notable for the following components:    Ferritin 884 (*)     All other components within normal limits   C-REACTIVE PROTEIN - Abnormal; Notable for the following components:    .6 (*)     All other components within normal limits   SARS-COV-2 RDRP GENE - Abnormal; Notable for the following components:    POC Rapid COVID Positive (*)     All other components within normal limits   TSH   ALCOHOL,MEDICAL (ETHANOL)   BASIC METABOLIC PANEL   MAGNESIUM   CBC W/ AUTO DIFFERENTIAL   POCT INFLUENZA A/B MOLECULAR   POCT GLUCOSE, HAND-HELD DEVICE     X-Ray Chest 1 View   Final Result      Similar findings compared to 07/24/2023.         Electronically signed by: Gee Sood MD   Date:    07/27/2023   Time:    20:33          EKG reading noted in ED course.     PROCEDURE(S):  NONE      MEDICAL DECISION MAKING     External documents/records reviewed:  As indicated in ED course.      This is an emergent evaluation for a 72 y.o. male with the following chronic medical condition impacting care CHF, CAD, DM, HTN, HLD, and rhabdomyolysis, presents with bilateral lower extremity swelling and pain beginning a few weeks ago.  Exam shows anasarca, and mildly disheveled appearance.   Social determinant of health affecting care:  Social support.    Diagnosis or treatment significantly limited by social determinants of health.The following differential diagnoses were considered and/or addressed: heart  failure, liver failure, renal failure. Unlikely DVT.    Treatment plan includes history, physical exam, cardiac monitoring, labs, imaging studies, and supportive care.      ED Course     The results of physical exam findings were reviewed with the patient. This discussion included but not exclusive to the risk to the patient due to the potential underlying pathology, the testing that was required to make the diagnosis, and the treatment administered or prescribed.      MDM  Reviewed: previous chart, nursing note and vitals  Reviewed previous: labs, ECG and x-ray  Interpretation: labs, ECG and x-ray  Consults:        ED Course as of 07/28/23 0148   u Jul 27, 2023   1801 Old records reviewed:  Patient admitted for fever and wound infections.  Discharge on July 25, 2023 with creatinine of 2.1. [NO]   1801 EKG 12-lead  Reviewed and independently interpreted:  No STEMI  First-degree AV block heart rate 75  Left bundle branch block  No ectopy, Nl axis   [NO]   1836 Hemoglobin(!): 8.2  Anemia otherwise relatively unremarkable CBC [NO]   1943 SARS-CoV-2 RNA, Amplification, Qual(!): Positive  abnormal [NO]   1943 POC Molecular Influenza A Ag: Negative  unremarkable [NO]   1943 POC Molecular Influenza B Ag: Negative  unremarkable [NO]   1943 BNP(!): 487  elevated [NO]   1943 Creatinine(!): 2.7  Elevated. Old records reviewed: 07/22/23 04:14  Creatinine: 2.1 (H) [NO]   1943 X-Ray Chest 1 View  nad [NO]   1944 SpO2: 96 %  Interpreted as normal by emergency medicine physician.  Interventions none.   [NO]   2132 Awaiting UA. [NO]   2157 Occult Blood UA(!): 2+  abnormal [NO]   2157 RBC, UA(!): 7  abnormal [NO]   2248 Per niece, health aid states patient [NO]   2257 Per niece, patient does NOT live in assisted living area.  He states at [facility] which is a low incoming housing complex for elderly residents.  There's a manager but not outside help from the office.  [NO]      ED Course User Index  [NO] [place]  MD Barry     REASSESSMENT:  Sx improved after treatment with:   Lasix.    Clinical Impression     1. COVID-19 virus infection    2. Localized swelling of both lower extremities    3. Flu-like symptoms    4. Polyneuropathy    5. Chest pain    6. Acute kidney injury superimposed on chronic kidney disease         ED Disposition Condition    Observation Stable            SCRIBE #1 NOTE: I, Raleigh Bañuelos, am scribing for, and in the presence of,  Braeden Reddy MD. I have scribed the following portions of the note - Other sections scribed: HPI, ROS, PE.     I, Dr. Braeden Reddy, personally performed the services described in this documentation. All medical record entries made by the scribe were at my direction and in my presence.  I have reviewed the chart and agree that the record reflects my personal performance and is accurate and complete. Braeden Reddy MD.                Braeden Reddy MD  07/28/23 0149

## 2023-07-27 NOTE — Clinical Note
Diagnosis: COVID-19 virus infection [[id number]]   Future Attending Provider: RABIA SALAZAR [[id number]]   Admitting Provider:: RABIA SALAZAR [[id number]]

## 2023-07-28 ENCOUNTER — PATIENT OUTREACH (OUTPATIENT)
Dept: ADMINISTRATIVE | Facility: CLINIC | Age: 73
End: 2023-07-28
Payer: MEDICARE

## 2023-07-28 PROBLEM — D63.8 ANEMIA IN OTHER CHRONIC DISEASES CLASSIFIED ELSEWHERE: Chronic | Status: ACTIVE | Noted: 2023-07-28

## 2023-07-28 PROBLEM — T07.XXXA MULTIPLE WOUNDS: Chronic | Status: ACTIVE | Noted: 2023-07-28

## 2023-07-28 LAB
ANION GAP SERPL CALC-SCNC: 11 MMOL/L (ref 8–16)
BASOPHILS # BLD AUTO: 0.03 K/UL (ref 0–0.2)
BASOPHILS NFR BLD: 0.6 % (ref 0–1.9)
BUN SERPL-MCNC: 42 MG/DL (ref 8–23)
CALCIUM SERPL-MCNC: 8.5 MG/DL (ref 8.7–10.5)
CHLORIDE SERPL-SCNC: 101 MMOL/L (ref 95–110)
CO2 SERPL-SCNC: 21 MMOL/L (ref 23–29)
CREAT SERPL-MCNC: 2.4 MG/DL (ref 0.5–1.4)
CRP SERPL-MCNC: 110.6 MG/L (ref 0–8.2)
DIFFERENTIAL METHOD: ABNORMAL
EOSINOPHIL # BLD AUTO: 0.2 K/UL (ref 0–0.5)
EOSINOPHIL NFR BLD: 4.3 % (ref 0–8)
ERYTHROCYTE [DISTWIDTH] IN BLOOD BY AUTOMATED COUNT: 14 % (ref 11.5–14.5)
EST. GFR  (NO RACE VARIABLE): 28 ML/MIN/1.73 M^2
ETHANOL SERPL-MCNC: <10 MG/DL
FERRITIN SERPL-MCNC: 884 NG/ML (ref 20–300)
GLUCOSE SERPL-MCNC: 100 MG/DL (ref 70–110)
HCT VFR BLD AUTO: 22.6 % (ref 40–54)
HGB BLD-MCNC: 7.5 G/DL (ref 14–18)
IMM GRANULOCYTES # BLD AUTO: 0.03 K/UL (ref 0–0.04)
IMM GRANULOCYTES NFR BLD AUTO: 0.6 % (ref 0–0.5)
LYMPHOCYTES # BLD AUTO: 0.6 K/UL (ref 1–4.8)
LYMPHOCYTES NFR BLD: 13.1 % (ref 18–48)
MAGNESIUM SERPL-MCNC: 1.6 MG/DL (ref 1.6–2.6)
MCH RBC QN AUTO: 26.6 PG (ref 27–31)
MCHC RBC AUTO-ENTMCNC: 33.2 G/DL (ref 32–36)
MCV RBC AUTO: 80 FL (ref 82–98)
MONOCYTES # BLD AUTO: 0.6 K/UL (ref 0.3–1)
MONOCYTES NFR BLD: 13.1 % (ref 4–15)
NEUTROPHILS # BLD AUTO: 3.2 K/UL (ref 1.8–7.7)
NEUTROPHILS NFR BLD: 68.3 % (ref 38–73)
NRBC BLD-RTO: 0 /100 WBC
PLATELET # BLD AUTO: 175 K/UL (ref 150–450)
PMV BLD AUTO: 8.2 FL (ref 9.2–12.9)
POCT GLUCOSE: 106 MG/DL (ref 70–110)
POCT GLUCOSE: 342 MG/DL (ref 70–110)
POCT GLUCOSE: 82 MG/DL (ref 70–110)
POCT GLUCOSE: 95 MG/DL (ref 70–110)
POTASSIUM SERPL-SCNC: 4.4 MMOL/L (ref 3.5–5.1)
RBC # BLD AUTO: 2.82 M/UL (ref 4.6–6.2)
SODIUM SERPL-SCNC: 133 MMOL/L (ref 136–145)
WBC # BLD AUTO: 4.66 K/UL (ref 3.9–12.7)

## 2023-07-28 PROCEDURE — G0378 HOSPITAL OBSERVATION PER HR: HCPCS

## 2023-07-28 PROCEDURE — 25000003 PHARM REV CODE 250

## 2023-07-28 PROCEDURE — 80048 BASIC METABOLIC PNL TOTAL CA: CPT

## 2023-07-28 PROCEDURE — 99285 EMERGENCY DEPT VISIT HI MDM: CPT

## 2023-07-28 PROCEDURE — 82728 ASSAY OF FERRITIN: CPT

## 2023-07-28 PROCEDURE — 97166 OT EVAL MOD COMPLEX 45 MIN: CPT

## 2023-07-28 PROCEDURE — 83735 ASSAY OF MAGNESIUM: CPT

## 2023-07-28 PROCEDURE — 63600175 PHARM REV CODE 636 W HCPCS: Mod: JZ,TB

## 2023-07-28 PROCEDURE — 85025 COMPLETE CBC W/AUTO DIFF WBC: CPT

## 2023-07-28 PROCEDURE — 86140 C-REACTIVE PROTEIN: CPT

## 2023-07-28 PROCEDURE — 97161 PT EVAL LOW COMPLEX 20 MIN: CPT

## 2023-07-28 PROCEDURE — 97535 SELF CARE MNGMENT TRAINING: CPT

## 2023-07-28 PROCEDURE — 36415 COLL VENOUS BLD VENIPUNCTURE: CPT

## 2023-07-28 PROCEDURE — 82077 ASSAY SPEC XCP UR&BREATH IA: CPT

## 2023-07-28 PROCEDURE — 96372 THER/PROPH/DIAG INJ SC/IM: CPT

## 2023-07-28 RX ORDER — GABAPENTIN 100 MG/1
100 CAPSULE ORAL 2 TIMES DAILY
Status: DISCONTINUED | OUTPATIENT
Start: 2023-07-28 | End: 2023-08-16 | Stop reason: HOSPADM

## 2023-07-28 RX ORDER — FUROSEMIDE 10 MG/ML
40 INJECTION INTRAMUSCULAR; INTRAVENOUS
Status: DISCONTINUED | OUTPATIENT
Start: 2023-07-28 | End: 2023-08-03

## 2023-07-28 RX ORDER — CAPSAICIN 0.03 G/100G
CREAM TOPICAL 2 TIMES DAILY
Status: DISCONTINUED | OUTPATIENT
Start: 2023-07-28 | End: 2023-08-16 | Stop reason: HOSPADM

## 2023-07-28 RX ADMIN — FUROSEMIDE 40 MG: 10 INJECTION, SOLUTION INTRAVENOUS at 09:07

## 2023-07-28 RX ADMIN — AMIODARONE HYDROCHLORIDE 200 MG: 200 TABLET ORAL at 09:07

## 2023-07-28 RX ADMIN — APIXABAN 5 MG: 5 TABLET, FILM COATED ORAL at 08:07

## 2023-07-28 RX ADMIN — GABAPENTIN 100 MG: 100 CAPSULE ORAL at 08:07

## 2023-07-28 RX ADMIN — INSULIN ASPART 8 UNITS: 100 INJECTION, SOLUTION INTRAVENOUS; SUBCUTANEOUS at 11:07

## 2023-07-28 RX ADMIN — ACETAMINOPHEN 650 MG: 325 TABLET ORAL at 12:07

## 2023-07-28 RX ADMIN — GABAPENTIN 100 MG: 100 CAPSULE ORAL at 02:07

## 2023-07-28 RX ADMIN — INSULIN ASPART 27 UNITS: 100 INJECTION, SUSPENSION SUBCUTANEOUS at 04:07

## 2023-07-28 RX ADMIN — CAPSAICIN: 0.25 CREAM TOPICAL at 09:07

## 2023-07-28 RX ADMIN — AMLODIPINE BESYLATE 10 MG: 5 TABLET ORAL at 09:07

## 2023-07-28 RX ADMIN — METOPROLOL SUCCINATE 25 MG: 25 TABLET, EXTENDED RELEASE ORAL at 09:07

## 2023-07-28 RX ADMIN — GABAPENTIN 100 MG: 100 CAPSULE ORAL at 09:07

## 2023-07-28 RX ADMIN — TAMSULOSIN HYDROCHLORIDE 0.4 MG: 0.4 CAPSULE ORAL at 09:07

## 2023-07-28 RX ADMIN — FUROSEMIDE 40 MG: 10 INJECTION, SOLUTION INTRAVENOUS at 08:07

## 2023-07-28 RX ADMIN — INSULIN ASPART 27 UNITS: 100 INJECTION, SUSPENSION SUBCUTANEOUS at 09:07

## 2023-07-28 RX ADMIN — REMDESIVIR 200 MG: 100 INJECTION, POWDER, LYOPHILIZED, FOR SOLUTION INTRAVENOUS at 02:07

## 2023-07-28 RX ADMIN — ATORVASTATIN CALCIUM 80 MG: 40 TABLET, FILM COATED ORAL at 09:07

## 2023-07-28 RX ADMIN — APIXABAN 5 MG: 5 TABLET, FILM COATED ORAL at 09:07

## 2023-07-28 NOTE — ASSESSMENT & PLAN NOTE
Chronic HFpEF without exacerbation  IV Lasix   Strict I's and O's  Daily weights   Fluid restriction 1.5 L    Results for orders placed during the hospital encounter of 06/14/23    Echo    Interpretation Summary  · The left ventricle is normal in size with concentric hypertrophy and normal systolic function.  · The estimated ejection fraction is 60%.  · Grade II left ventricular diastolic dysfunction.  · Normal right ventricular size with normal right ventricular systolic function.  · Moderate left atrial enlargement.  · There is moderate aortic valve stenosis.  · Aortic valve area is 1.43 cm2; peak velocity is 3.94 m/s; mean gradient is 31 mmHg.  · Mild-to-moderate aortic regurgitation.  · Mild mitral regurgitation.  · Normal central venous pressure (3 mmHg).  · The estimated PA systolic pressure is 39 mmHg.  BNP  Recent Labs   Lab 07/27/23  1756   *

## 2023-07-28 NOTE — HOSPITAL COURSE
Admission to hospital for BLE edema up hips due to acute on chronic diastolic heart failure and malnutrition. Patient also found positive COVID 19 -asymptomatic on admission so Remdesivir discontinued. Edema improving with IV diuresis.  Became hypoxic night 7/29 requiring 2 L NC. Started Remdesivir/decadron (7/30), repeat CXR. Hypoglycemia (7/29) improved with oral intake and holding insulin.   He also have left  knee pain and edema similar to previous admission in June where Orthopedic surgery suspected gouty arthritis and attempted aspiration but only able to give steroid injection with improvement. No colchicine due to CKD. Left knee x ray on 7/17/23 showed significant suprapatellar effusion. Due to pain and decrease mobility, will ask Orthopedic Surgery to evaluate.  Hold apixaban  (last dose 7/29 2221).   Hgb treaded down 8.2>7.5>7.1>6.5 without over signs of bleeding. Had normal BM on 7/29 no melena or hematochezia. Previous iron studies low iron/t stat and elevated ferritin, normal vitamin b 12, and low folic acid. Start folic acid, ferrous sulfate and transfuse 1 unit of PRBC.   Recent admission 7/17-7/25 for sepsis due to shingles/cellulitis and ELIZABETH. Right buttock lesion from shingles are all in different part of healing phase without drainage. Also have gluteal cleft excoriation. Continue wound care as recommended by wound care team on previous admission-Cleanse both area with vashe, put mepilex to gluteal cleft, and put hydrogel to shingle lesion.   Also have right upper lateral back below axillary unroof blister and right foot ventral aspect skin tear. Keep clean and dry.     PT/OT evaluation completed. TN/SW to assist with SNF.

## 2023-07-28 NOTE — ASSESSMENT & PLAN NOTE
Down compared to previous labs. Recent iron studies Ferritin 888 and low iron and T sat  Start iron therapy  Add vitamin b 12 and folate given history alcohol

## 2023-07-28 NOTE — PT/OT/SLP EVAL
"Physical Therapy Evaluation     Patient Name: Chato Bowie   MRN: [id number]  Recent Surgery: * No surgery found *      Recommendations:     Discharge Recommendations: nursing facility, skilled   Discharge Equipment Recommendations:  (TBD)     Assessment:     Chato Bowie is a 72 y.o. male admitted with a medical diagnosis of Chronic diastolic heart failure. He presents with the following impairments/functional limitations: weakness, impaired endurance, impaired functional mobility, impaired self care skills, decreased lower extremity function, pain, decreased ROM, impaired skin, edema.     Rehab Prognosis: Good; patient would benefit from acute PT services to address these deficits and reach maximum level of function.    Plan:     During this hospitalization, patient to be seen 5 x/week (Sat or Sun 7/29 or 7/30) to address the above listed problems via gait training, therapeutic activities, therapeutic exercises    Plan of Care Expires: 08/04/23    Subjective     Chief Complaint: "Look how swollen my legs are..."  Patient Comments/Goals: Pt agreeable to PT evaluation, expressed desire to be able to walk before going home.  Pain/Comfort:  Pain Rating 1: 10/10  Location - Orientation 1: lower  Location 1: abdomen  Pain Addressed 1: Reposition    Social History:  Living Environment: Patient lives alone in  [facility] apartments  with number of outside stair(s): 0  Prior Level of Function: Prior to admission, patient was modified independent  Equipment Used at Home: rollator  DME owned (not currently used): none  Assistance Upon Discharge: unknown    Objective:     Communicated with nurse, prior to session. Patient found HOB elevated with PureWick, telemetry, pressure relief boots upon PT entry to room.    General Precautions: Standard, fall   Orthopedic Precautions: N/A   Braces: N/A    Respiratory Status: Room air    Exams:  Cognition: Patient is oriented to Person, Place, Time, Situation  RLE ROM: WFL  RLE " Strength:  grossly 3+/5  LLE ROM:  decreased knee flexion; limited by edema and soft tissue  LLE Strength:  grossly 3/5      Functional Mobility:  Gait belt applied - No  Bed Mobility  Supine to Sit: maximal assistance for LE management and trunk management  Sit to Supine: maximal assistance for LE management and trunk management  Transfers  NT; pt unable at this time  Gait  NT  Balance  Sitting: stand by assistance  Standing:  NT      Therapeutic Activities and Exercises:   Patient educated on role of acute care PT and PT POC and call light usage  Patient performed 1 set(s) of 10 repetitions of the following seated exercises: ankle pumps and long arc quads for bilateral LE. Patient required skilled PT for instruction of exercises and appropriate cues to perform exercises safely and appropriately.  Pt instructed on APs, QS, and GS while supine in bed, legs elevated by lifting bottom of bed.    AM-PAC 6 CLICK MOBILITY  Total Score:9    Patient left  HOB and lower bed elevated  with all lines intact, call button in reach, and all needs in reach .    GOALS:   Multidisciplinary Problems       Physical Therapy Goals          Problem: Physical Therapy    Goal Priority Disciplines Outcome Goal Variances Interventions   Physical Therapy Goal     PT, PT/OT Ongoing, Progressing     Description: Goals to be met by: 23     Patient will increase functional independence with mobility by performin. Pt to be min A with bed mobility.  2. Pt to transfer with min A.  3. Pt to be able to tolerate standing with RW EOB; Pt to ambulate 15' w/RW min A.  4. Pt to be (I) with written HEP.                         History:     Past Medical History:   Diagnosis Date    Acute congestive heart failure 3/20/2020    Alcohol abuse     Arthritis     Asthma attack 2021    Coronary artery disease     Diabetes mellitus     Hyperlipemia     Hypertension     Multiple thyroid nodules 2023    Rhabdomyolysis        Past Surgical  History:   Procedure Laterality Date    EYE SURGERY      TRANSESOPHAGEAL ECHOCARDIOGRAM WITH POSSIBLE CARDIOVERSION (MARIA W/ POSS CARDIOVERSION) N/A 1/5/2023    Procedure: Transesophageal echo (MARIA) intra-procedure log documentation;  Surgeon: Indra Foote MD;  Location: [facility] CATH LAB;  Service: Cardiology;  Laterality: N/A;    TREATMENT OF CARDIAC ARRHYTHMIA N/A 12/7/2020    Procedure: Cardioversion or Defibrillation;  Surgeon: Indra Foote MD;  Location: [facility] CATH LAB;  Service: Cardiology;  Laterality: N/A;    TREATMENT OF CARDIAC ARRHYTHMIA N/A 12/30/2020    Procedure: Cardioversion or Defibrillation;  Surgeon: Tyrone Steen MD;  Location: [facility] CATH LAB;  Service: Cardiology;  Laterality: N/A;    TREATMENT OF CARDIAC ARRHYTHMIA N/A 1/5/2023    Procedure: Cardioversion or Defibrillation;  Surgeon: Indra Foote MD;  Location: [facility] CATH LAB;  Service: Cardiology;  Laterality: N/A;    VASCULAR SURGERY         Time Tracking:     PT Received On: 07/28/23  PT Start Time: 1515  PT Stop Time: 1533  PT Total Time (min): 18 min     Billable Minutes: Evaluation 18    7/28/2023

## 2023-07-28 NOTE — NURSING
[facility], [facility]  Nurses Note -- 4 Eyes      7/28/2023       Skin assessed on: Q Shift      [] No Pressure Injuries Present    []Prevention Measures Documented    [x] Yes LDA  for Pressure Injury Previously documented     [] Yes New Pressure Injury Discovered   [] LDA for New Pressure Injury Added      Attending RN:  Mandi Watkins LPN     Second RN:  ZACHARY Jones RN

## 2023-07-28 NOTE — ASSESSMENT & PLAN NOTE
1. Gluteal cleft - excoriation- Wound care consult from previous admission few days ago-   2. Right buttock lesion from shingles - no vesicles  Cleanse with vashe and put hydrogel to right buttock   External jackson catheter to divert urine from wounds

## 2023-07-28 NOTE — ASSESSMENT & PLAN NOTE
Patient has a history of alcohol abuse however denies any recent used in the past week.  Not showing any signs or symptoms of withdrawal at this time.  CIWA Q shift, monitor for withdrawals  Check ETOH

## 2023-07-28 NOTE — SUBJECTIVE & OBJECTIVE
Interval History: denies cp or sob. Want the fluid in legs to go away so he can use his cane.    Review of Systems   Constitutional:  Negative for chills and fever.   Eyes:  Negative for photophobia and visual disturbance.   Respiratory:  Negative for cough and shortness of breath.    Cardiovascular:  Positive for leg swelling. Negative for chest pain and palpitations.   Gastrointestinal:  Negative for abdominal pain, diarrhea, nausea and vomiting.   Genitourinary:  Negative for frequency, hematuria and urgency.   Musculoskeletal:  Positive for joint swelling and myalgias (BLE).   Skin:  Positive for rash and wound. Negative for pallor.   Neurological:  Negative for light-headedness and headaches.   Psychiatric/Behavioral:  Negative for confusion and decreased concentration.    Objective:     Vital Signs (Most Recent):  Temp: 98 °F (36.7 °C) (07/28/23 0415)  Pulse: 61 (07/28/23 0415)  Resp: 17 (07/28/23 0415)  BP: (!) 159/67 (07/28/23 0415)  SpO2: 98 % (07/28/23 0415) Vital Signs (24h Range):  Temp:  [97.9 °F (36.6 °C)-100.2 °F (37.9 °C)] 98 °F (36.7 °C)  Pulse:  [61-74] 61  Resp:  [13-22] 17  SpO2:  [95 %-100 %] 98 %  BP: (136-185)/() 159/67     Weight: 98.9 kg (218 lb)  Body mass index is 30.4 kg/m².    Intake/Output Summary (Last 24 hours) at 7/28/2023 0657  Last data filed at 7/28/2023 0525  Gross per 24 hour   Intake 480 ml   Output 700 ml   Net -220 ml         Physical Exam  Vitals and nursing note reviewed.   Constitutional:       General: He is not in acute distress.     Appearance: He is well-developed.   Cardiovascular:      Rate and Rhythm: Normal rate.   Pulmonary:      Effort: Pulmonary effort is normal. No respiratory distress.      Breath sounds: Normal breath sounds. No wheezing or rales.   Abdominal:      General: Bowel sounds are normal. There is no distension.      Palpations: Abdomen is soft.      Tenderness: There is no abdominal tenderness.   Musculoskeletal:         General: No  tenderness. Normal range of motion.      Cervical back: Normal range of motion and neck supple.      Right lower leg: Edema present.      Left lower leg: Edema present.      Comments: Anasarca noted up to pelvis/abdomen   Skin:     General: Skin is warm and dry.      Findings: Lesion and rash present.      Comments: See media for pictures from previous admission   1. Gluteal cleft/buttocks -denuded  2. Right buttock shingles lesions- no vesicles   Neurological:      Mental Status: He is alert and oriented to person, place, and time.   Psychiatric:         Thought Content: Thought content normal.           Significant Labs: All pertinent labs within the past 24 hours have been reviewed.    Significant Imaging: I have reviewed all pertinent imaging results/findings within the past 24 hours.

## 2023-07-28 NOTE — PLAN OF CARE
Consult addressed. LINCOLN was consulted to assist patient to get admitted to assisted living. SW met with patient at his bedside. Patient stated that he was just discharge from the hospital on 7/25/23. Patient is living at [facility] and he is currently receiving  services through [facility].    10:15pm Nurse Coy informed SW that she received a call from patient's niece to request that patient is not send back home because it's unsafe.     10:20pm LINCOLN contacted patient jero. She stated that today the HH nurse went to visit patient and found him in a bad condition and he call the ambulance to bring patient to the hospital. She stated that his uncle has no help at home and it's not safe for him to discharge back there. LINCOLN asked nimonica to assist patient in getting his financial documents so that the process for the nursing home can get started. Jero stated that she will call his apartment office to see if they have his award letter from social security.

## 2023-07-28 NOTE — PLAN OF CARE
Problem: Occupational Therapy  Goal: Occupational Therapy Goal  Description: Goals to be met by: 8/18/23     Patient will increase functional independence with ADLs by performing:    UE Dressing with Gates.  LE Dressing with Modified Gates.  Grooming while seated at sink with Gates.  Toileting from toilet with Modified Gates for hygiene and clothing management.   Bathing from  edge of bed with Minimal Assistance.  Toilet transfer to toilet with Modified Gates.  Upper extremity exercise program x10-15 reps per handout, with supervision.      Patient seen by occupational therapy for initial evaluation, so see notes for more info. Patient is having difficulty taking care of himself at home, so needs SNF care. Occupational therapy to see 5x/week.   Outcome: Ongoing, Progressing

## 2023-07-28 NOTE — PLAN OF CARE
Problem: Adult Inpatient Plan of Care  Goal: Plan of Care Review  Outcome: Ongoing, Progressing     Problem: Adult Inpatient Plan of Care  Goal: Patient-Specific Goal (Individualized)  Outcome: Ongoing, Progressing     Problem: Adult Inpatient Plan of Care  Goal: Absence of Hospital-Acquired Illness or Injury  Outcome: Ongoing, Progressing     Problem: Adult Inpatient Plan of Care  Goal: Optimal Comfort and Wellbeing  Outcome: Ongoing, Progressing     Problem: Adult Inpatient Plan of Care  Goal: Readiness for Transition of Care  Outcome: Ongoing, Progressing     Problem: Diabetes Comorbidity  Goal: Blood Glucose Level Within Targeted Range  Outcome: Ongoing, Progressing     Problem: Adjustment to Illness (Sepsis/Septic Shock)  Goal: Optimal Coping  Outcome: Ongoing, Progressing     Problem: Fluid and Electrolyte Imbalance (Acute Kidney Injury/Impairment)  Goal: Fluid and Electrolyte Balance  Outcome: Ongoing, Progressing     Problem: Impaired Wound Healing  Goal: Optimal Wound Healing  Outcome: Ongoing, Progressing     Problem: Skin Injury Risk Increased  Goal: Skin Health and Integrity  Outcome: Ongoing, Progressing

## 2023-07-28 NOTE — HPI
Chato Bowie 72 y.o. male with CHF, CAD, DM, HTN, HLD, presents to the hospital with a chief complaint of BLE swelling and pain.  onset several weeks ago per chart review, patient was recently discharged from the ED yesterday for similar complaints, and was discharged from inpatient status on 07/25/2023. the patient had endorsed no relief to his complaints since discharge. Patient currently reports complaints of BLE swelling with associated pain. He states his complaints have been ongoing for a few weeks, but had worsened as of recent. He additionally reports he last had similar complaints a month ago, noting he had to be admitted for a couple of nights. He endorses compliance with antihypertensives and diuretics. He reports following with his PCP at [facility], but reports he does not know who is his cardiologist. Denies fever, shortness of breath, or cough.  Patient's niece states that the patient does not live in assisted living however he does need it as she can not provide care for him especially due to him being COVID positive.  No other alleviating or exacerbating factors noted.    In the ED patient was hypertensive (181/140) afebrile without leukocytosis, mild normocytic anemia noted, BUN 46, creatinine 2.7, EGFR 24, glucose 136, albumin 2.3, , TSH WNL, COVID positive, flu negative, UA showed +1 protein, +2 occult blood, CXR was negative for acute process.  Patient was placed in observation

## 2023-07-28 NOTE — ASSESSMENT & PLAN NOTE
Patient's FSGs are uncontrolled due to hyperglycemia on current medication regimen.  Last A1c reviewed-   Lab Results   Component Value Date    HGBA1C 7.8 (H) 06/02/2023     Most recent fingerstick glucose reviewed- No results for input(s): POCTGLUCOSE in the last 24 hours.  Current correctional scale  Medium  Maintain anti-hyperglycemic dose as follows-   Antihyperglycemics (From admission, onward)    Start     Stop Route Frequency Ordered    07/28/23 0715  insulin aspart protamine-insulin aspart (NovoLOG 70/30) injection         -- SubQ 2 times daily with meals 07/27/23 2326 07/28/23 0024  insulin aspart U-100 pen 1-10 Units         -- SubQ Before meals & nightly PRN 07/27/23 2326        Hold Oral hypoglycemics while patient is in the hospital.

## 2023-07-28 NOTE — PLAN OF CARE
Problem: Adult Inpatient Plan of Care  Goal: Plan of Care Review  Outcome: Ongoing, Progressing  Flowsheets (Taken 7/28/2023 1810)  Plan of Care Reviewed With: patient  Goal: Patient-Specific Goal (Individualized)  Outcome: Ongoing, Progressing  Goal: Absence of Hospital-Acquired Illness or Injury  Outcome: Ongoing, Progressing  Intervention: Identify and Manage Fall Risk  Flowsheets (Taken 7/28/2023 1810)  Safety Promotion/Fall Prevention:   Fall Risk reviewed with patient/family   assistive device/personal item within reach   high risk medications identified   side rails raised x 2   nonskid shoes/socks when out of bed   room near unit station   instructed to call staff for mobility   medications reviewed  Intervention: Prevent Skin Injury  Flowsheets (Taken 7/28/2023 1810)  Body Position: position changed independently  Skin Protection:   adhesive use limited   tubing/devices free from skin contact   incontinence pads utilized  Intervention: Prevent and Manage VTE (Venous Thromboembolism) Risk  Flowsheets (Taken 7/28/2023 1810)  Activity Management:   Rolling - L1   Arm raise - L1   Ankle pumps - L1  VTE Prevention/Management:   ambulation promoted   bleeding precations maintained   bleeding risk assessed  Range of Motion: active ROM (range of motion) encouraged  Intervention: Prevent Infection  Flowsheets (Taken 7/28/2023 1810)  Infection Prevention: hand hygiene promoted  Goal: Optimal Comfort and Wellbeing  Outcome: Ongoing, Progressing  Goal: Readiness for Transition of Care  Outcome: Ongoing, Progressing     Problem: Infection  Goal: Absence of Infection Signs and Symptoms  Outcome: Ongoing, Progressing  Intervention: Prevent or Manage Infection  Flowsheets (Taken 7/28/2023 1810)  Infection Management: aseptic technique maintained     Problem: Infection  Goal: Absence of Infection Signs and Symptoms  Outcome: Ongoing, Progressing  Intervention: Prevent or Manage Infection  Flowsheets (Taken 7/28/2023  1810)  Infection Management: aseptic technique maintained     Problem: Impaired Wound Healing  Goal: Optimal Wound Healing  Outcome: Ongoing, Progressing  Intervention: Promote Wound Healing  Flowsheets (Taken 7/28/2023 1810)  Sleep/Rest Enhancement: relaxation techniques promoted  Activity Management:   Rolling - L1   Arm raise - L1   Ankle pumps - L1  Pain Management Interventions:   medication offered   relaxation techniques promoted     Problem: Skin Injury Risk Increased  Goal: Skin Health and Integrity  Outcome: Ongoing, Progressing  Intervention: Optimize Skin Protection  Flowsheets (Taken 7/28/2023 1810)  Pressure Reduction Techniques:   frequent weight shift encouraged   pressure points protected   weight shift assistance provided  Skin Protection:   adhesive use limited   tubing/devices free from skin contact   incontinence pads utilized  Head of Bed (HOB) Positioning: HOB at 30-45 degrees   Pt alert able to make needs known,jose meds well,no s/s adverse reaction noted,reposition self q 2hrs, no  c/o pain ,POC explained,remains free from falls and pressure injuries,safety maintained,continue monitoring.

## 2023-07-28 NOTE — ASSESSMENT & PLAN NOTE
No results found for: LDH, LDH   Chronic, stable, substitute for atorvastatin while in the hospital

## 2023-07-28 NOTE — ASSESSMENT & PLAN NOTE
BP poorly controlled while in the ED, continue home antihypertensives-amlodipine, hydralazine, metoprol   Prn hydralazine SBP >180

## 2023-07-28 NOTE — ASSESSMENT & PLAN NOTE
Prior to admission patient used a roller walker for ambulation at baseline.  Reportedly from patient he was unable to ambulate even with walker due to swelling of his legs, exam showed no difficulty with ROM.  PT/OT evaluation for decrease in mobility

## 2023-07-28 NOTE — SUBJECTIVE & OBJECTIVE
Past Medical History:   Diagnosis Date    Acute congestive heart failure 3/20/2020    Alcohol abuse     Arthritis     Asthma attack 11/24/2021    Coronary artery disease     Diabetes mellitus     Hyperlipemia     Hypertension     Multiple thyroid nodules 6/14/2023    Rhabdomyolysis        Past Surgical History:   Procedure Laterality Date    EYE SURGERY      TRANSESOPHAGEAL ECHOCARDIOGRAM WITH POSSIBLE CARDIOVERSION (MARIA W/ POSS CARDIOVERSION) N/A 1/5/2023    Procedure: Transesophageal echo (MARIA) intra-procedure log documentation;  Surgeon: Indra Foote MD;  Location: [facility] CATH LAB;  Service: Cardiology;  Laterality: N/A;    TREATMENT OF CARDIAC ARRHYTHMIA N/A 12/7/2020    Procedure: Cardioversion or Defibrillation;  Surgeon: Indra Foote MD;  Location: [facility] CATH LAB;  Service: Cardiology;  Laterality: N/A;    TREATMENT OF CARDIAC ARRHYTHMIA N/A 12/30/2020    Procedure: Cardioversion or Defibrillation;  Surgeon: Tyrone Steen MD;  Location: [facility] CATH LAB;  Service: Cardiology;  Laterality: N/A;    TREATMENT OF CARDIAC ARRHYTHMIA N/A 1/5/2023    Procedure: Cardioversion or Defibrillation;  Surgeon: Indra Foote MD;  Location: [facility] CATH LAB;  Service: Cardiology;  Laterality: N/A;    VASCULAR SURGERY         Review of patient's allergies indicates:  No Known Allergies    No current facility-administered medications on file prior to encounter.     Current Outpatient Medications on File Prior to Encounter   Medication Sig    albuterol (PROVENTIL/VENTOLIN HFA) 90 mcg/actuation inhaler Inhale 2 puffs into the lungs every 6 (six) hours as needed for Wheezing or Shortness of Breath.    amiodarone (PACERONE) 200 MG Tab Take 200 mg by mouth once daily.    amLODIPine (NORVASC) 10 MG tablet Take 1 tablet (10 mg total) by mouth once daily.    diclofenac sodium (VOLTAREN) 1 % Gel Apply 2 g topically 2 (two) times daily as needed (pain).    ELIQUIS 5 mg Tab Take 5 mg by mouth 2 (two) times daily.     furosemide (LASIX) 40 MG tablet Take 1 tablet (40 mg total) by mouth 2 (two) times daily.    gabapentin (NEURONTIN) 100 MG capsule Take 1 capsule (100 mg total) by mouth 2 (two) times daily.    hydrALAZINE (APRESOLINE) 100 MG tablet Take 1 tablet (100 mg total) by mouth every 8 (eight) hours.    HYDROcodone-acetaminophen (NORCO)  mg per tablet Take 1 to 2 tablets by mouth every 4 (four) hours as needed for Pain.    insulin aspart U-100 (NOVOLOG) 100 unit/mL (3 mL) InPn pen Inject 5 Units into the skin 3 (three) times daily.    magnesium oxide (MAG-OX) 400 mg (241.3 mg magnesium) tablet Take 800 mg by mouth.    metOLazone (ZAROXOLYN) 5 MG tablet Take 5 mg by mouth 3 (three) times a week.    metoprolol succinate (TOPROL-XL) 25 MG 24 hr tablet Take 25 mg by mouth once daily.    NOVOLIN 70/30 U-100 INSULIN 100 unit/mL (70-30) injection Inject 55 Units into the skin 2 (two) times daily.    potassium chloride (KLOR-CON) 10 MEQ TbSR Take 10 mEq by mouth once daily.    rosuvastatin (CRESTOR) 40 MG Tab Take 40 mg by mouth every evening.    sildenafiL (VIAGRA) 100 MG tablet TAKE 1 TABLET BY MOUTH ONCE DAILY AS NEEDED FOR ERECTILE DYSFUNCTION.    tamsulosin (FLOMAX) 0.4 mg Cap Take 1 capsule (0.4 mg total) by mouth once daily.     Family History       Problem Relation (Age of Onset)    Diabetes Mother, Father, Sister    Hypertension Mother, Father, Sister          Tobacco Use    Smoking status: Never    Smokeless tobacco: Never   Substance and Sexual Activity    Alcohol use: Not Currently     Alcohol/week: 6.0 standard drinks     Types: 6 Cans of beer per week     Comment: daily    Drug use: No    Sexual activity: Yes     Partners: Female     Review of Systems   Constitutional:  Negative for chills and fever.   HENT:  Negative for congestion and rhinorrhea.    Eyes:  Negative for photophobia and visual disturbance.   Respiratory:  Negative for cough and shortness of breath.    Cardiovascular:  Positive for leg swelling.  Negative for chest pain and palpitations.   Gastrointestinal:  Negative for abdominal pain, diarrhea, nausea and vomiting.   Genitourinary:  Negative for frequency, hematuria and urgency.   Musculoskeletal:  Positive for joint swelling and myalgias (BLE).   Skin:  Negative for pallor, rash and wound.   Neurological:  Negative for light-headedness and headaches.   Psychiatric/Behavioral:  Negative for confusion and decreased concentration.    Objective:     Vital Signs (Most Recent):  Temp: 100.2 °F (37.9 °C) (07/27/23 2203)  Pulse: 70 (07/27/23 2203)  Resp: (!) 22 (07/27/23 2203)  BP: (!) 181/140 (07/27/23 2203)  SpO2: 98 % (07/27/23 2203) Vital Signs (24h Range):  Temp:  [97.9 °F (36.6 °C)-100.2 °F (37.9 °C)] 100.2 °F (37.9 °C)  Pulse:  [70-74] 70  Resp:  [18-22] 22  SpO2:  [95 %-98 %] 98 %  BP: (136-185)/() 181/140     Weight: 98.9 kg (218 lb)  Body mass index is 30.4 kg/m².     Physical Exam  Vitals and nursing note reviewed.   Constitutional:       General: He is not in acute distress.     Appearance: He is well-developed.   HENT:      Head: Normocephalic and atraumatic.      Right Ear: External ear normal.      Left Ear: External ear normal.      Nose: Nose normal.   Eyes:      Conjunctiva/sclera: Conjunctivae normal.      Pupils: Pupils are equal, round, and reactive to light.   Cardiovascular:      Rate and Rhythm: Normal rate. Rhythm irregular.   Pulmonary:      Effort: Pulmonary effort is normal. No respiratory distress.      Breath sounds: Normal breath sounds. No wheezing or rales.   Abdominal:      General: Bowel sounds are normal. There is no distension.      Palpations: Abdomen is soft.      Tenderness: There is no abdominal tenderness.      Comments: No palpable hepatomegaly or splenomegaly   Musculoskeletal:         General: No tenderness. Normal range of motion.      Cervical back: Normal range of motion and neck supple.      Right lower leg: Edema present.      Left lower leg: Edema present.       Comments: Anasarca noted up to pelvis/abdomen   Skin:     General: Skin is warm and dry.   Neurological:      Mental Status: He is alert and oriented to person, place, and time.   Psychiatric:         Thought Content: Thought content normal.            CRANIAL NERVES     CN III, IV, VI   Pupils are equal, round, and reactive to light.     Significant Labs: All pertinent labs within the past 24 hours have been reviewed.  Recent Lab Results         07/27/23  2106   07/27/23  1910   07/27/23  1901   07/27/23  1756        POC Molecular Influenza A Ag   Negative           POC Molecular Influenza B Ag   Negative           Albumin       2.3       Alkaline Phosphatase       66       ALT       22       Anion Gap       12       Appearance, UA Clear             AST       32       Bacteria, UA Few             Baso #       0.02       Basophil %       0.3       Bilirubin (UA) Negative             BILIRUBIN TOTAL       0.5  Comment: For infants and newborns, interpretation of results should be based  on gestational age, weight and in agreement with clinical  observations.    Premature Infant recommended reference ranges:  Up to 24 hours.............<8.0 mg/dL  Up to 48 hours............<12.0 mg/dL  3-5 days..................<15.0 mg/dL  6-29 days.................<15.0 mg/dL         BNP       487  Comment: Values of less than 100 pg/ml are consistent with non-CHF populations.       BUN       46       Calcium       9.0       Chloride       98       CO2       23       Color, UA Yellow             Creatinine       2.7       Differential Method       Automated       eGFR       24       Eos #       0.2       Eosinophil %       2.9       Glucose       136       Glucose, UA Negative             Gran # (ANC)       4.3       Gran %       72.4       Granular Casts, UA 1             Hematocrit       25.2       Hemoglobin       8.2       Hyaline Casts, UA 1             Immature Grans (Abs)       0.04  Comment: Mild elevation in immature  granulocytes is non specific and   can be seen in a variety of conditions including stress response,   acute inflammation, trauma and pregnancy. Correlation with other   laboratory and clinical findings is essential.         Immature Granulocytes       0.7       Ketones, UA Negative             Leukocytes, UA Negative             Lymph #       0.7       Lymph %       11.6       MCH       26.6       MCHC       32.5       MCV       82       Microscopic Comment SEE COMMENT  Comment: Other formed elements not mentioned in the report are not   present in the microscopic examination.                Mono #       0.7       Mono %       12.1       MPV       9.0       NITRITE UA Negative             nRBC       0       Occult Blood UA 2+             pH, UA 5.0             Platelets       200       Potassium       SEE COMMENT  Comment: Result not available.       PROTEIN TOTAL       7.4       Protein, UA 1+  Comment: Recommend a 24 hour urine protein or a urine   protein/creatinine ratio if globulin induced proteinuria is  clinically suspected.                Acceptable   Yes   Yes         RBC       3.08       RBC, UA 7             RDW       14.0       SARS-CoV-2 RNA, Amplification, Qual     Positive         Sodium       133  Comment: Specimen markedly hemolyzed       Specific Gravity, UA 1.010             Specimen UA Urine, Catheterized             Squam Epithel, UA 2             TSH       2.597       UROBILINOGEN UA Negative             WBC, UA 1             WBC       5.93               Significant Imaging: I have reviewed all pertinent imaging results/findings within the past 24 hours.  Imaging Results              X-Ray Chest 1 View (Final result)  Result time 07/27/23 20:33:27      Final result by Gee Sood MD (07/27/23 20:33:27)                   Impression:      Similar findings compared to 07/24/2023.      Electronically signed by: Gee Sood MD  Date:    07/27/2023  Time:    20:33           "     Narrative:    EXAMINATION:  XR CHEST 1 VIEW    CLINICAL HISTORY:  Provided history is "  Other general symptoms and signs".    TECHNIQUE:  One view of the chest.    COMPARISON:  07/24/2023.    FINDINGS:  Cardiac wires overlie the chest.  Cardiomediastinal silhouette is stable.  No detrimental change in lung aeration.                                     "

## 2023-07-28 NOTE — ASSESSMENT & PLAN NOTE
Patient is identified as High risk for severe complications of COVID 19 based on COVID risk score of 8   Initiate standard COVID protocols; COVID-19 testing ,Infection Control notification  and isolation- respiratory, contact and droplet per protocol    Diagnostics: CBC, CMP, Ferritin, CRP, BNP, ECG, Rapid Flu and Portable CXR    Management: Maintain oxygen saturations 92-96% via Nasal Cannula  LPM and monitor with continuous/intermittent pulse oximetry.  and Inhaled bronchodilators as needed for shortness of breath.    Advance Care Planning  Current advance care plan has not been discussed with patient/family/POA and patient currently wishes Full Code.

## 2023-07-28 NOTE — PLAN OF CARE
07/28/23 1105   Discharge Planning   Assessment Type Discharge Planning Brief Assessment   Resource/Environmental Concerns none   Support Systems Family members  (neice who is not able to help with care of pt at all)   Equipment Currently Used at Home rollator;power chair;walker, rolling   Current Living Arrangements apartment   Patient/Family Anticipates Transition to home with help/services   Patient/Family Anticipated Services at Transition   (TBD)   DME Needed Upon Discharge  none   Discharge Plan A Other  (TBD)       [id number] - [place], [place] - [address]  [address]  [address]  [facility] [address]  Phone: [phone] Fax: [phone]    [facility] [address]  [address] [place] [address]  Phone: [phone] Fax: [phone]    [facility] [address]  [address] [place] [id number]  Phone: [phone] Fax: [phone]

## 2023-07-28 NOTE — H&P
[facility] Medicine  History & Physical    Patient Name: Chato Bowie  MRN: [id number]  Patient Class: OP- Observation  Admission Date: 7/27/2023  Attending Physician: Raoul Morse MD   Primary Care Provider: [facility]         Patient information was obtained from patient, past medical records and ER records.     Subjective:     Principal Problem:COVID-19 virus infection    Chief Complaint:   Chief Complaint   Patient presents with    Leg Swelling     Pt BIB EMS from home for leg swelling. Pt has chronic bilateral swelling, and pain, and a wound to his back.         HPI: Chato Bowie 72 y.o. male with CHF, CAD, DM, HTN, HLD, presents to the hospital with a chief complaint of BLE swelling and pain.  onset several weeks ago per chart review, patient was recently discharged from the ED yesterday for similar complaints, and was discharged from inpatient status on 07/25/2023. the patient had endorsed no relief to his complaints since discharge. Patient currently reports complaints of BLE swelling with associated pain. He states his complaints have been ongoing for a few weeks, but had worsened as of recent. He additionally reports he last had similar complaints a month ago, noting he had to be admitted for a couple of nights. He endorses compliance with antihypertensives and diuretics. He reports following with his PCP at [facility], but reports he does not know who is his cardiologist. Denies fever, shortness of breath, or cough.  Patient's niece states that the patient does not live in assisted living however he does need it as she can not provide care for him especially due to him being COVID positive.  No other alleviating or exacerbating factors noted.    In the ED patient was hypertensive (181/140) afebrile without leukocytosis, mild normocytic anemia noted, BUN 46, creatinine 2.7, EGFR 24, glucose 136, albumin 2.3, , TSH WNL, COVID positive, flu negative, UA  showed +1 protein, +2 occult blood, CXR was negative for acute process.  Patient was placed in observation      Past Medical History:   Diagnosis Date    Acute congestive heart failure 3/20/2020    Alcohol abuse     Arthritis     Asthma attack 11/24/2021    Coronary artery disease     Diabetes mellitus     Hyperlipemia     Hypertension     Multiple thyroid nodules 6/14/2023    Rhabdomyolysis        Past Surgical History:   Procedure Laterality Date    EYE SURGERY      TRANSESOPHAGEAL ECHOCARDIOGRAM WITH POSSIBLE CARDIOVERSION (MARIA W/ POSS CARDIOVERSION) N/A 1/5/2023    Procedure: Transesophageal echo (MARIA) intra-procedure log documentation;  Surgeon: Indra Foote MD;  Location: [facility] CATH LAB;  Service: Cardiology;  Laterality: N/A;    TREATMENT OF CARDIAC ARRHYTHMIA N/A 12/7/2020    Procedure: Cardioversion or Defibrillation;  Surgeon: Indra Foote MD;  Location: [facility] CATH LAB;  Service: Cardiology;  Laterality: N/A;    TREATMENT OF CARDIAC ARRHYTHMIA N/A 12/30/2020    Procedure: Cardioversion or Defibrillation;  Surgeon: Tyrone Steen MD;  Location: [facility] CATH LAB;  Service: Cardiology;  Laterality: N/A;    TREATMENT OF CARDIAC ARRHYTHMIA N/A 1/5/2023    Procedure: Cardioversion or Defibrillation;  Surgeon: Indra Foote MD;  Location: [facility] CATH LAB;  Service: Cardiology;  Laterality: N/A;    VASCULAR SURGERY         Review of patient's allergies indicates:  No Known Allergies    No current facility-administered medications on file prior to encounter.     Current Outpatient Medications on File Prior to Encounter   Medication Sig    albuterol (PROVENTIL/VENTOLIN HFA) 90 mcg/actuation inhaler Inhale 2 puffs into the lungs every 6 (six) hours as needed for Wheezing or Shortness of Breath.    amiodarone (PACERONE) 200 MG Tab Take 200 mg by mouth once daily.    amLODIPine (NORVASC) 10 MG tablet Take 1 tablet (10 mg total) by mouth once daily.    diclofenac sodium (VOLTAREN) 1 % Gel Apply 2 g  topically 2 (two) times daily as needed (pain).    ELIQUIS 5 mg Tab Take 5 mg by mouth 2 (two) times daily.    furosemide (LASIX) 40 MG tablet Take 1 tablet (40 mg total) by mouth 2 (two) times daily.    gabapentin (NEURONTIN) 100 MG capsule Take 1 capsule (100 mg total) by mouth 2 (two) times daily.    hydrALAZINE (APRESOLINE) 100 MG tablet Take 1 tablet (100 mg total) by mouth every 8 (eight) hours.    HYDROcodone-acetaminophen (NORCO)  mg per tablet Take 1 to 2 tablets by mouth every 4 (four) hours as needed for Pain.    insulin aspart U-100 (NOVOLOG) 100 unit/mL (3 mL) InPn pen Inject 5 Units into the skin 3 (three) times daily.    magnesium oxide (MAG-OX) 400 mg (241.3 mg magnesium) tablet Take 800 mg by mouth.    metOLazone (ZAROXOLYN) 5 MG tablet Take 5 mg by mouth 3 (three) times a week.    metoprolol succinate (TOPROL-XL) 25 MG 24 hr tablet Take 25 mg by mouth once daily.    NOVOLIN 70/30 U-100 INSULIN 100 unit/mL (70-30) injection Inject 55 Units into the skin 2 (two) times daily.    potassium chloride (KLOR-CON) 10 MEQ TbSR Take 10 mEq by mouth once daily.    rosuvastatin (CRESTOR) 40 MG Tab Take 40 mg by mouth every evening.    sildenafiL (VIAGRA) 100 MG tablet TAKE 1 TABLET BY MOUTH ONCE DAILY AS NEEDED FOR ERECTILE DYSFUNCTION.    tamsulosin (FLOMAX) 0.4 mg Cap Take 1 capsule (0.4 mg total) by mouth once daily.     Family History       Problem Relation (Age of Onset)    Diabetes Mother, Father, Sister    Hypertension Mother, Father, Sister          Tobacco Use    Smoking status: Never    Smokeless tobacco: Never   Substance and Sexual Activity    Alcohol use: Not Currently     Alcohol/week: 6.0 standard drinks     Types: 6 Cans of beer per week     Comment: daily    Drug use: No    Sexual activity: Yes     Partners: Female     Review of Systems   Constitutional:  Negative for chills and fever.   HENT:  Negative for congestion and rhinorrhea.    Eyes:  Negative for photophobia and visual  disturbance.   Respiratory:  Negative for cough and shortness of breath.    Cardiovascular:  Positive for leg swelling. Negative for chest pain and palpitations.   Gastrointestinal:  Negative for abdominal pain, diarrhea, nausea and vomiting.   Genitourinary:  Negative for frequency, hematuria and urgency.   Musculoskeletal:  Positive for joint swelling and myalgias (BLE).   Skin:  Negative for pallor, rash and wound.   Neurological:  Negative for light-headedness and headaches.   Psychiatric/Behavioral:  Negative for confusion and decreased concentration.    Objective:     Vital Signs (Most Recent):  Temp: 100.2 °F (37.9 °C) (07/27/23 2203)  Pulse: 70 (07/27/23 2203)  Resp: (!) 22 (07/27/23 2203)  BP: (!) 181/140 (07/27/23 2203)  SpO2: 98 % (07/27/23 2203) Vital Signs (24h Range):  Temp:  [97.9 °F (36.6 °C)-100.2 °F (37.9 °C)] 100.2 °F (37.9 °C)  Pulse:  [70-74] 70  Resp:  [18-22] 22  SpO2:  [95 %-98 %] 98 %  BP: (136-185)/() 181/140     Weight: 98.9 kg (218 lb)  Body mass index is 30.4 kg/m².     Physical Exam  Vitals and nursing note reviewed.   Constitutional:       General: He is not in acute distress.     Appearance: He is well-developed.   HENT:      Head: Normocephalic and atraumatic.      Right Ear: External ear normal.      Left Ear: External ear normal.      Nose: Nose normal.   Eyes:      Conjunctiva/sclera: Conjunctivae normal.      Pupils: Pupils are equal, round, and reactive to light.   Cardiovascular:      Rate and Rhythm: Normal rate. Rhythm irregular.   Pulmonary:      Effort: Pulmonary effort is normal. No respiratory distress.      Breath sounds: Normal breath sounds. No wheezing or rales.   Abdominal:      General: Bowel sounds are normal. There is no distension.      Palpations: Abdomen is soft.      Tenderness: There is no abdominal tenderness.      Comments: No palpable hepatomegaly or splenomegaly   Musculoskeletal:         General: No tenderness. Normal range of motion.      Cervical  back: Normal range of motion and neck supple.      Right lower leg: Edema present.  +3 pitting     Left lower leg: Edema present. +3 pitting     Comments: Anasarca noted up to pelvis/abdomen   Skin:     General: Skin is warm and dry.   Neurological:      Mental Status: He is alert and oriented to person, place, and time.   Psychiatric:         Thought Content: Thought content normal.            CRANIAL NERVES     CN III, IV, VI   Pupils are equal, round, and reactive to light.     Significant Labs: All pertinent labs within the past 24 hours have been reviewed.  Recent Lab Results         07/27/23  2106   07/27/23  1910   07/27/23  1901   07/27/23  1756        POC Molecular Influenza A Ag   Negative           POC Molecular Influenza B Ag   Negative           Albumin       2.3       Alkaline Phosphatase       66       ALT       22       Anion Gap       12       Appearance, UA Clear             AST       32       Bacteria, UA Few             Baso #       0.02       Basophil %       0.3       Bilirubin (UA) Negative             BILIRUBIN TOTAL       0.5  Comment: For infants and newborns, interpretation of results should be based  on gestational age, weight and in agreement with clinical  observations.    Premature Infant recommended reference ranges:  Up to 24 hours.............<8.0 mg/dL  Up to 48 hours............<12.0 mg/dL  3-5 days..................<15.0 mg/dL  6-29 days.................<15.0 mg/dL         BNP       487  Comment: Values of less than 100 pg/ml are consistent with non-CHF populations.       BUN       46       Calcium       9.0       Chloride       98       CO2       23       Color, UA Yellow             Creatinine       2.7       Differential Method       Automated       eGFR       24       Eos #       0.2       Eosinophil %       2.9       Glucose       136       Glucose, UA Negative             Gran # (ANC)       4.3       Gran %       72.4       Granular Casts, UA 1             Hematocrit        25.2       Hemoglobin       8.2       Hyaline Casts, UA 1             Immature Grans (Abs)       0.04  Comment: Mild elevation in immature granulocytes is non specific and   can be seen in a variety of conditions including stress response,   acute inflammation, trauma and pregnancy. Correlation with other   laboratory and clinical findings is essential.         Immature Granulocytes       0.7       Ketones, UA Negative             Leukocytes, UA Negative             Lymph #       0.7       Lymph %       11.6       MCH       26.6       MCHC       32.5       MCV       82       Microscopic Comment SEE COMMENT  Comment: Other formed elements not mentioned in the report are not   present in the microscopic examination.                Mono #       0.7       Mono %       12.1       MPV       9.0       NITRITE UA Negative             nRBC       0       Occult Blood UA 2+             pH, UA 5.0             Platelets       200       Potassium       SEE COMMENT  Comment: Result not available.       PROTEIN TOTAL       7.4       Protein, UA 1+  Comment: Recommend a 24 hour urine protein or a urine   protein/creatinine ratio if globulin induced proteinuria is  clinically suspected.                Acceptable   Yes   Yes         RBC       3.08       RBC, UA 7             RDW       14.0       SARS-CoV-2 RNA, Amplification, Qual     Positive         Sodium       133  Comment: Specimen markedly hemolyzed       Specific Gravity, UA 1.010             Specimen UA Urine, Catheterized             Squam Epithel, UA 2             TSH       2.597       UROBILINOGEN UA Negative             WBC, UA 1             WBC       5.93               Significant Imaging: I have reviewed all pertinent imaging results/findings within the past 24 hours.  Imaging Results              X-Ray Chest 1 View (Final result)  Result time 07/27/23 20:33:27      Final result by Gee Sood MD (07/27/23 20:33:27)                   Impression:     "  Similar findings compared to 07/24/2023.      Electronically signed by: Gee Sood MD  Date:    07/27/2023  Time:    20:33               Narrative:    EXAMINATION:  XR CHEST 1 VIEW    CLINICAL HISTORY:  Provided history is "  Other general symptoms and signs".    TECHNIQUE:  One view of the chest.    COMPARISON:  07/24/2023.    FINDINGS:  Cardiac wires overlie the chest.  Cardiomediastinal silhouette is stable.  No detrimental change in lung aeration.                                       Assessment/Plan:     * COVID-19 virus infection  Patient is identified as High risk for severe complications of COVID 19 based on COVID risk score of 8   Initiate standard COVID protocols; COVID-19 testing ,Infection Control notification  and isolation- respiratory, contact and droplet per protocol    Diagnostics: CBC, CMP, Ferritin, CRP, BNP, ECG, Rapid Flu and Portable CXR    Management: Maintain oxygen saturations 92-96% via Nasal Cannula  LPM and monitor with continuous/intermittent pulse oximetry.  and Inhaled bronchodilators as needed for shortness of breath.    Advance Care Planning  Current advance care plan has not been discussed with patient/family/POA and patient currently wishes Full Code.    Debility Due to left knee pain and effusion due to gout? and/or injury  Prior to admission patient used a roller walker for ambulation at baseline.  Reportedly from patient he was unable to ambulate even with walker due to swelling of his legs, exam showed no difficulty with ROM.  PT/OT evaluation for decrease in mobility    Class 1 obesity due to excess calories with serious comorbidity and body mass index (BMI) of 30.0 to 30.9 in adult  Body mass index is 30.4 kg/m². Morbid obesity complicates all aspects of disease management from diagnostic modalities to treatment. Weight loss encouraged and health benefits explained to patient.     Chronic diastolic heart failure  Chronic HFpEF without exacerbation  IV Lasix   Strict " I's and O's  Daily weights   Fluid restriction 1.5 L    Results for orders placed during the hospital encounter of 06/14/23    Echo    Interpretation Summary  · The left ventricle is normal in size with concentric hypertrophy and normal systolic function.  · The estimated ejection fraction is 60%.  · Grade II left ventricular diastolic dysfunction.  · Normal right ventricular size with normal right ventricular systolic function.  · Moderate left atrial enlargement.  · There is moderate aortic valve stenosis.  · Aortic valve area is 1.43 cm2; peak velocity is 3.94 m/s; mean gradient is 31 mmHg.  · Mild-to-moderate aortic regurgitation.  · Mild mitral regurgitation.  · Normal central venous pressure (3 mmHg).  · The estimated PA systolic pressure is 39 mmHg.  BNP  Recent Labs   Lab 07/27/23  1756   *           Anasarca  Chronic due to CHF, continue IV Lasix  Swelling to BLE and abdomen      CKD (chronic kidney disease), stage IV  Stable, at baseline, maintain euvolemic state, strict I/O's, monitor renal function and electrolytes, avoid nephrotoxic agents.   Daily BMP    Paroxysmal atrial flutter  Continue apixaban    BPH (benign prostatic hyperplasia)  Chronic, stable, continue Flomax    Alcohol abuse  Patient has a history of alcohol abuse however denies any recent used in the past week.  Not showing any signs or symptoms of withdrawal at this time.  CIWA Q shift, monitor for withdrawals  Check ETOH      Type 2 diabetes mellitus with kidney complication, with long-term current use of insulin  Patient's FSGs are uncontrolled due to hyperglycemia on current medication regimen.  Last A1c reviewed-   Lab Results   Component Value Date    HGBA1C 7.8 (H) 06/02/2023     Most recent fingerstick glucose reviewed- No results for input(s): POCTGLUCOSE in the last 24 hours.  Current correctional scale  Medium  Maintain anti-hyperglycemic dose as follows-   Antihyperglycemics (From admission, onward)      Start     Stop  Route Frequency Ordered    07/28/23 0715  insulin aspart protamine-insulin aspart (NovoLOG 70/30) injection         -- SubQ 2 times daily with meals 07/27/23 2326 07/28/23 0024  insulin aspart U-100 pen 1-10 Units         -- SubQ Before meals & nightly PRN 07/27/23 2326          Hold Oral hypoglycemics while patient is in the hospital.    Dyslipidemia  Chronic, stable, substitute for atorvastatin while in the hospital      Essential hypertension  BP poorly controlled while in the ED, continue home antihypertensives  Prn hydralazine SBP >180      VTE Risk Mitigation (From admission, onward)           Ordered     apixaban tablet 5 mg  2 times daily         07/27/23 2326     IP VTE HIGH RISK PATIENT  Once         07/27/23 2326     Place sequential compression device  Until discontinued         07/27/23 2326     Reason for No Pharmacological VTE Prophylaxis  Once        Question:  Reasons:  Answer:  Already adequately anticoagulated on oral Anticoagulants    07/27/23 2326                         On 07/27/2023, patient should be placed in hospital observation services under my care in collaboration with Raoul Mrose MD.      Cory Gómez NP  Department of Hospital Medicine  [facility] [place] - Emergency Dept

## 2023-07-28 NOTE — PROGRESS NOTES
[facility] Medicine  Progress Note    Patient Name: Chato Bowie  MRN: [id number]  Patient Class: OP- Observation   Admission Date: 7/27/2023  Length of Stay: 0 days  Attending Physician: Raoul Morse MD  Primary Care Provider: Irlanda Valenzuela        Subjective:     Principal Problem:Chronic diastolic heart failure        HPI:  Chato Bowie 72 y.o. male with CHF, CAD, DM, HTN, HLD, presents to the hospital with a chief complaint of BLE swelling and pain.  onset several weeks ago per chart review, patient was recently discharged from the ED yesterday for similar complaints, and was discharged from inpatient status on 07/25/2023. the patient had endorsed no relief to his complaints since discharge. Patient currently reports complaints of BLE swelling with associated pain. He states his complaints have been ongoing for a few weeks, but had worsened as of recent. He additionally reports he last had similar complaints a month ago, noting he had to be admitted for a couple of nights. He endorses compliance with antihypertensives and diuretics. He reports following with his PCP at [facility], but reports he does not know who is his cardiologist. Denies fever, shortness of breath, or cough.  Patient's niece states that the patient does not live in assisted living however he does need it as she can not provide care for him especially due to him being COVID positive.  No other alleviating or exacerbating factors noted.    In the ED patient was hypertensive (181/140) afebrile without leukocytosis, mild normocytic anemia noted, BUN 46, creatinine 2.7, EGFR 24, glucose 136, albumin 2.3, , TSH WNL, COVID positive, flu negative, UA showed +1 protein, +2 occult blood, CXR was negative for acute process.  Patient was placed in observation      Overview/Hospital Course:  Admission to observation for acute on chronic diastolic heart failure. Patient also found positive COVID 19  -asymptomatic.    Very recent discharge from hospital on 7/25/23   72-year-old with extensive past medical history including CHF, arthritis, coronary artery disease, diabetes mellitus type 2, hypertension and hyperlipidemia admitted on 7/17/2023 for sepsis likely secondary to shingles and overlying cellulitis and acute kidney injury. Stated on IV antibiotics and continued on valacyclovir for shingles treatment. Wound care consulted. Also found to have elevated troponin and hypokalemia.  Electrolytes replaced as needed.  Suspect troponin elevation due to demand in the setting of sepsis and ELIZABETH.  ECG reviewed and read as unusual P axis. Has known LBBB. Recent echo with normal EF and grade II diastolic dysfunction.  Troponin trended down.  Patient without any chest pain or shortness of breath. ELIZABETH improving and leukocytosis resolved. PT/OT consulted-recommends SNF. Patient unable to care for himself, will likely need to transition to assisted NH. Patient medically clear and stable for discharge to SNF. Awaiting placement.    CM attempted to get patient's financials and patient unwilling. Family unwilling to assist. Will get home with  and have  assigned so he can get placed in NH if possible.  wound care ordered. Pain meds given.    Stop taking losartan   Start taking Amlodipine   Increase Hydralazine   Pain meds ordered for you (both Norco and RADHIKA)   Follow up with PCP and Nephrology   Wound care will come to your house, also PT/OT- please answer phone and door.    BLE edema up to hips. Continue IV diuresis.       Interval History: denies cp or sob. Want the fluid in legs to go away so he can use his cane.    Review of Systems   Constitutional:  Negative for chills and fever.   Eyes:  Negative for photophobia and visual disturbance.   Respiratory:  Negative for cough and shortness of breath.    Cardiovascular:  Positive for leg swelling. Negative for chest pain and palpitations.    Gastrointestinal:  Negative for abdominal pain, diarrhea, nausea and vomiting.   Genitourinary:  Negative for frequency, hematuria and urgency.   Musculoskeletal:  Positive for joint swelling and myalgias (BLE).   Skin:  Positive for rash and wound. Negative for pallor.   Neurological:  Negative for light-headedness and headaches.   Psychiatric/Behavioral:  Negative for confusion and decreased concentration.    Objective:     Vital Signs (Most Recent):  Temp: 98 °F (36.7 °C) (07/28/23 0415)  Pulse: 61 (07/28/23 0415)  Resp: 17 (07/28/23 0415)  BP: (!) 159/67 (07/28/23 0415)  SpO2: 98 % (07/28/23 0415) Vital Signs (24h Range):  Temp:  [97.9 °F (36.6 °C)-100.2 °F (37.9 °C)] 98 °F (36.7 °C)  Pulse:  [61-74] 61  Resp:  [13-22] 17  SpO2:  [95 %-100 %] 98 %  BP: (136-185)/() 159/67     Weight: 98.9 kg (218 lb)  Body mass index is 30.4 kg/m².    Intake/Output Summary (Last 24 hours) at 7/28/2023 0657  Last data filed at 7/28/2023 0525  Gross per 24 hour   Intake 480 ml   Output 700 ml   Net -220 ml         Physical Exam  Vitals and nursing note reviewed.   Constitutional:       General: He is not in acute distress.     Appearance: He is well-developed.   Cardiovascular:      Rate and Rhythm: Normal rate.   Pulmonary:      Effort: Pulmonary effort is normal. No respiratory distress.      Breath sounds: Normal breath sounds. No wheezing or rales.   Abdominal:      General: Bowel sounds are normal. There is no distension.      Palpations: Abdomen is soft.      Tenderness: There is no abdominal tenderness.   Musculoskeletal:         General: No tenderness. Normal range of motion.      Cervical back: Normal range of motion and neck supple.      Right lower leg: Edema present.      Left lower leg: Edema present.      Comments: Anasarca noted up to pelvis/abdomen   Skin:     General: Skin is warm and dry.      Findings: Lesion and rash present.      Comments: See media for pictures from previous admission   1. Gluteal  cleft/buttocks -denuded  2. Right buttock shingles lesions- no vesicles   Neurological:      Mental Status: He is alert and oriented to person, place, and time.   Psychiatric:         Thought Content: Thought content normal.           Significant Labs: All pertinent labs within the past 24 hours have been reviewed.    Significant Imaging: I have reviewed all pertinent imaging results/findings within the past 24 hours.      Assessment/Plan:      * acute on chronic diastolic heart failure  Presents with increase BLE edema inhibiting patient to ambulate. Recent 2 D echo in June HFpEF.   Continue IV diuresis      Debility Due to left knee pain and effusion due to gout? and/or injury  Prior to admission patient used a roller walker for ambulation at baseline.  Reportedly from patient he was unable to ambulate even with walker due to swelling of his legs, exam showed no difficulty with ROM.  PT/OT evaluation for decrease in mobility    Multiple wounds  1. Gluteal cleft - excoriation- Wound care consult from previous admission few days ago-   2. Right buttock lesion from shingles - no vesicles  Cleanse with vashe and put hydrogel to right buttock   External jackson catheter to divert urine from wounds      Anemia in other chronic diseases classified elsewhere  Down compared to previous labs. Recent iron studies Ferritin 888 and low iron and T sat  Start iron therapy  Add vitamin b 12 and folate given history alcohol      COVID-19 virus infection  Asymptomatic      Class 1 obesity due to excess calories with serious comorbidity and body mass index (BMI) of 30.0 to 30.9 in adult  Body mass index is 30.4 kg/m². Morbid obesity complicates all aspects of disease management from diagnostic modalities to treatment. Weight loss encouraged and health benefits explained to patient.     Anasarca  Due to HFpEF and CKD  See above #1      CKD (chronic kidney disease), stage IV  Stable, at baseline, maintain euvolemic state, strict I/O's,  monitor renal function and electrolytes, avoid nephrotoxic agents.   Daily BMP    Paroxysmal atrial flutter  Continue apixaban    BPH (benign prostatic hyperplasia)  Chronic, stable, continue Flomax    Alcohol abuse  Patient has a history of alcohol abuse however denies any recent used in the past week.  Not showing any signs or symptoms of withdrawal at this time.  CIWA Q shift, monitor for withdrawals  Check ETOH      Type 2 diabetes mellitus with kidney complication, with long-term current use of insulin  Patient's FSGs are uncontrolled due to hyperglycemia on current medication regimen.  Last A1c reviewed-   Lab Results   Component Value Date    HGBA1C 7.8 (H) 06/02/2023     Most recent fingerstick glucose reviewed- No results for input(s): POCTGLUCOSE in the last 24 hours.  Current correctional scale  Medium  Maintain anti-hyperglycemic dose as follows-   Antihyperglycemics (From admission, onward)    Start     Stop Route Frequency Ordered    07/28/23 0715  insulin aspart protamine-insulin aspart (NovoLOG 70/30) injection         -- SubQ 2 times daily with meals 07/27/23 2326 07/28/23 0024  insulin aspart U-100 pen 1-10 Units         -- SubQ Before meals & nightly PRN 07/27/23 2326        Hold Oral hypoglycemics while patient is in the hospital.    Dyslipidemia  No results found for: LDH, LDH   Chronic, stable, substitute for atorvastatin while in the hospital      Essential hypertension  BP poorly controlled while in the ED, continue home antihypertensives-amlodipine, hydralazine, metoprol   Prn hydralazine SBP >180      VTE Risk Mitigation (From admission, onward)         Ordered     apixaban tablet 5 mg  2 times daily         07/27/23 2326     IP VTE HIGH RISK PATIENT  Once         07/27/23 2326     Place sequential compression device  Until discontinued         07/27/23 2326     Reason for No Pharmacological VTE Prophylaxis  Once        Question:  Reasons:  Answer:  Already adequately anticoagulated on  oral Anticoagulants    07/27/23 3607                Discharge Planning   ELY:      Code Status: Full Code   Is the patient medically ready for discharge?:     Reason for patient still in hospital (select all that apply): Treatment                     Sierra Hernandez NP  Department of Hospital Medicine   [facility]

## 2023-07-28 NOTE — ASSESSMENT & PLAN NOTE
Presents with increase BLE edema inhibiting patient to ambulate. Recent 2 D echo in June HFpEF.   Continue IV diuresis

## 2023-07-28 NOTE — PT/OT/SLP EVAL
Occupational Therapy Evaluation       Name: Chato Bowie  MRN: [id number]  Admitting Diagnosis: Chronic diastolic heart failure  Recent Surgery: * No surgery found *      Recommendations:     Discharge Recommendations: nursing facility, skilled  Level of Assistance Recommended: 24 hours significant assistance  Discharge Equipment Recommendations: none  Barriers to discharge: Other (Comment) (he cannot take care of himself)    Assessment:     Chato Bowie is a 72 y.o. male with a medical diagnosis of Chronic diastolic heart failure. He presents with performance deficits affecting function including weakness, impaired endurance, impaired sensation, impaired self care skills, impaired functional mobility, gait instability, decreased lower extremity function, decreased safety awareness, pain, impaired cardiopulmonary response to activity. Patient with 2L oxygen donned and at 99% and HR of 105.     Rehab Prognosis: Fair; patient would benefit from acute OT services to address these deficits and reach maximum level of function.    Plan:     Patient to be seen 5 x/week to address the above listed problems via self-care/home management, therapeutic activities, therapeutic exercises  Plan of Care Expires: 08/18/23  Plan of Care Reviewed with: patient    Subjective     Chief Complaint: B lower extremity pain and edema  Patient Comments/Goals: none named today   Pain/Comfort:  Pain Rating 1: 5/10  Location 1: abdomen    Patients cultural, spiritual, Taoism conflicts given the current situation: no    Social History:  Living Environment: Patient lives alone in a third floor apartment with number of outside stair(s): unsure and elevator at skilled nursing   Prior Level of Function: Prior to admission, patient requires assistance with ADLs including toileting and bathing   Roles and Routines: Patient was not driving and not working prior to admission.  Equipment Used at Home: rollator, power chair, cane, straight  DME owned (not  currently used): none  Assistance Upon Discharge: family    Objective:     Communicated with RN, Mandi KUMAR,  prior to session. Patient found HOB elevated with PureWick, telemetry, pressure relief boots, peripheral IV upon OT entry to room.    General Precautions: Standard, diabetic, fall   Orthopedic Precautions: N/A   Braces: N/A    Respiratory Status: Nasal cannula, flow 2 L/min    Occupational Performance    Gait belt applied - N/A    Bed Mobility:   Rolling/Turning to Right with minimum assistance  Scooting to HOB in supine: minimum assistance  Scooting anteriorly to EOB to have both feet planted on floor: unable   Supine to sit from right side of bed with maximal assistance  Sit to Supine with maximal assistance on right side of bed    Functional Mobility/Transfers:  Did not do, see PT for more information.     Activities of Daily Living:  Upper Body Dressing: minimum assistance  Lower Body Dressing: total assistance    Cognitive/Visual Perceptual:  Cognitive/Psychosocial Skills:    -     Oriented to: Person, Place, Time, Situation  -     Follows Commands/attention: Follows multistep  commands  -     Communication: clear/fluent  -     Memory: No Deficits noted  -     Safety awareness/insight to disability: impaired  -     Mood/Affect/Coping skills/emotional control: Flat affect  Visual/Perceptual:    -     Intact    Physical Exam:  Balance:    -     Sitting: minimum assistance  Postural examination/scapula alignment:    -       No postural abnormalities identified  Skin integrity: Wound right foot   Edema:  Severe B lower extremity   Sensation:    -       Intact  Motor Planning: Intact  Dominant hand: Right  Upper Extremity Range of Motion:     -       Right Upper Extremity: WNL  -       Left Upper Extremity: WNL  Upper Extremity Strength:    -       Right Upper Extremity: WNL  -       Left Upper Extremity: WNL   Strength:    -       Right Upper Extremity: WNL  -       Left Upper Extremity: WNL  Fine Motor  Coordination:    -       Intact  Gross motor coordination:   WFL    AMPAC 6 Click ADL:  AMPAC Total Score: 16    Treatment & Education:  Therapist provided facilitation and instruction of proper body mechanics, energy conservation, and fall prevention strategies during tasks listed above  Patient educated on role of OT, POC, and goals for therapy  Patient educated on importance of OOB activities with staff member assistance and sitting OOB majority of the day  Patient educated about scooting to HOB using right lower extremity and Both upper extremity      Patient not clear to transfer with RN/PCT.    Patient left HOB elevated with all lines intact, call button in reach, RN notified, and bed alarm on.    GOALS:   Multidisciplinary Problems       Occupational Therapy Goals          Problem: Occupational Therapy    Goal Priority Disciplines Outcome Interventions   Occupational Therapy Goal     OT, PT/OT Ongoing, Progressing    Description: Goals to be met by: 8/18/23     Patient will increase functional independence with ADLs by performing:    UE Dressing with [place].  LE Dressing with Modified [place].  Grooming while seated at sink with [place].  Toileting from toilet with Modified [place] for hygiene and clothing management.   Bathing from  edge of bed with Minimal Assistance.  Toilet transfer to toilet with Modified [place].  Upper extremity exercise program x10-15 reps per handout, with supervision.      Patient seen by occupational therapy for initial evaluation, so see notes for more info. Patient is having difficulty taking care of himself at home, so needs SNF care. Occupational therapy to see 5x/week.                        History:     Past Medical History:   Diagnosis Date    Acute congestive heart failure 3/20/2020    Alcohol abuse     Arthritis     Asthma attack 11/24/2021    Coronary artery disease     Diabetes mellitus     Hyperlipemia     Hypertension     Multiple thyroid  nodules 6/14/2023    Rhabdomyolysis          Past Surgical History:   Procedure Laterality Date    EYE SURGERY      TRANSESOPHAGEAL ECHOCARDIOGRAM WITH POSSIBLE CARDIOVERSION (MARIA W/ POSS CARDIOVERSION) N/A 1/5/2023    Procedure: Transesophageal echo (MARIA) intra-procedure log documentation;  Surgeon: Indra Foote MD;  Location: [facility] CATH LAB;  Service: Cardiology;  Laterality: N/A;    TREATMENT OF CARDIAC ARRHYTHMIA N/A 12/7/2020    Procedure: Cardioversion or Defibrillation;  Surgeon: Indra Foote MD;  Location: [facility] CATH LAB;  Service: Cardiology;  Laterality: N/A;    TREATMENT OF CARDIAC ARRHYTHMIA N/A 12/30/2020    Procedure: Cardioversion or Defibrillation;  Surgeon: Tyrone Steen MD;  Location: [facility] CATH LAB;  Service: Cardiology;  Laterality: N/A;    TREATMENT OF CARDIAC ARRHYTHMIA N/A 1/5/2023    Procedure: Cardioversion or Defibrillation;  Surgeon: Indra Foote MD;  Location: [facility] CATH LAB;  Service: Cardiology;  Laterality: N/A;    VASCULAR SURGERY         Time Tracking:     OT Date of Treatment: 07/28/23  OT Start Time: 1358  OT Stop Time: 1430  OT Total Time (min): 32 min    Billable Minutes: Evaluation 15 and Self Care/Home Management 17    7/28/2023

## 2023-07-28 NOTE — PLAN OF CARE
Problem: Physical Therapy  Goal: Physical Therapy Goal  Description: Goals to be met by: 23     Patient will increase functional independence with mobility by performin. Pt to be min A with bed mobility.  2. Pt to transfer with min A.  3. Pt to be able to tolerate standing with RW EOB; Pt to ambulate 15' w/RW min A.  4. Pt to be (I) with written HEP.    Outcome: Ongoing, Progressing   Initial eval completed, see in chart for details.

## 2023-07-28 NOTE — ASSESSMENT & PLAN NOTE
Stable, at baseline, maintain euvolemic state, strict I/O's, monitor renal function and electrolytes, avoid nephrotoxic agents.   Daily BMP

## 2023-07-29 LAB
ANION GAP SERPL CALC-SCNC: 10 MMOL/L (ref 8–16)
BASOPHILS # BLD AUTO: 0.02 K/UL (ref 0–0.2)
BASOPHILS NFR BLD: 0.4 % (ref 0–1.9)
BUN SERPL-MCNC: 47 MG/DL (ref 8–23)
CALCIUM SERPL-MCNC: 8.4 MG/DL (ref 8.7–10.5)
CHLORIDE SERPL-SCNC: 100 MMOL/L (ref 95–110)
CO2 SERPL-SCNC: 25 MMOL/L (ref 23–29)
CREAT SERPL-MCNC: 2.2 MG/DL (ref 0.5–1.4)
DIFFERENTIAL METHOD: ABNORMAL
EOSINOPHIL # BLD AUTO: 0.2 K/UL (ref 0–0.5)
EOSINOPHIL NFR BLD: 3 % (ref 0–8)
ERYTHROCYTE [DISTWIDTH] IN BLOOD BY AUTOMATED COUNT: 14 % (ref 11.5–14.5)
EST. GFR  (NO RACE VARIABLE): 31 ML/MIN/1.73 M^2
FOLATE SERPL-MCNC: 3.5 NG/ML (ref 4–24)
GLUCOSE SERPL-MCNC: 58 MG/DL (ref 70–110)
HCT VFR BLD AUTO: 22.2 % (ref 40–54)
HGB BLD-MCNC: 7.1 G/DL (ref 14–18)
IMM GRANULOCYTES # BLD AUTO: 0.05 K/UL (ref 0–0.04)
IMM GRANULOCYTES NFR BLD AUTO: 0.9 % (ref 0–0.5)
LYMPHOCYTES # BLD AUTO: 0.9 K/UL (ref 1–4.8)
LYMPHOCYTES NFR BLD: 15.7 % (ref 18–48)
MAGNESIUM SERPL-MCNC: 1.4 MG/DL (ref 1.6–2.6)
MCH RBC QN AUTO: 26.3 PG (ref 27–31)
MCHC RBC AUTO-ENTMCNC: 32 G/DL (ref 32–36)
MCV RBC AUTO: 82 FL (ref 82–98)
MONOCYTES # BLD AUTO: 0.8 K/UL (ref 0.3–1)
MONOCYTES NFR BLD: 14.2 % (ref 4–15)
NEUTROPHILS # BLD AUTO: 3.6 K/UL (ref 1.8–7.7)
NEUTROPHILS NFR BLD: 65.8 % (ref 38–73)
NRBC BLD-RTO: 0 /100 WBC
PLATELET # BLD AUTO: 242 K/UL (ref 150–450)
PMV BLD AUTO: 8.8 FL (ref 9.2–12.9)
POCT GLUCOSE: 115 MG/DL (ref 70–110)
POCT GLUCOSE: 128 MG/DL (ref 70–110)
POCT GLUCOSE: 167 MG/DL (ref 70–110)
POCT GLUCOSE: 59 MG/DL (ref 70–110)
POCT GLUCOSE: 66 MG/DL (ref 70–110)
POCT GLUCOSE: 99 MG/DL (ref 70–110)
POTASSIUM SERPL-SCNC: 2.9 MMOL/L (ref 3.5–5.1)
RBC # BLD AUTO: 2.7 M/UL (ref 4.6–6.2)
SODIUM SERPL-SCNC: 135 MMOL/L (ref 136–145)
VIT B12 SERPL-MCNC: 652 PG/ML (ref 210–950)
WBC # BLD AUTO: 5.41 K/UL (ref 3.9–12.7)

## 2023-07-29 PROCEDURE — 83735 ASSAY OF MAGNESIUM: CPT

## 2023-07-29 PROCEDURE — 85025 COMPLETE CBC W/AUTO DIFF WBC: CPT

## 2023-07-29 PROCEDURE — 82746 ASSAY OF FOLIC ACID SERUM: CPT | Performed by: HOSPITALIST

## 2023-07-29 PROCEDURE — 80048 BASIC METABOLIC PNL TOTAL CA: CPT

## 2023-07-29 PROCEDURE — 63600175 PHARM REV CODE 636 W HCPCS

## 2023-07-29 PROCEDURE — G0378 HOSPITAL OBSERVATION PER HR: HCPCS

## 2023-07-29 PROCEDURE — 25000003 PHARM REV CODE 250: Performed by: HOSPITALIST

## 2023-07-29 PROCEDURE — 25000242 PHARM REV CODE 250 ALT 637 W/ HCPCS: Performed by: NURSE PRACTITIONER

## 2023-07-29 PROCEDURE — 36415 COLL VENOUS BLD VENIPUNCTURE: CPT | Performed by: HOSPITALIST

## 2023-07-29 PROCEDURE — 94799 UNLISTED PULMONARY SVC/PX: CPT

## 2023-07-29 PROCEDURE — 97530 THERAPEUTIC ACTIVITIES: CPT | Mod: CQ

## 2023-07-29 PROCEDURE — 25000003 PHARM REV CODE 250

## 2023-07-29 PROCEDURE — 82607 VITAMIN B-12: CPT | Performed by: NURSE PRACTITIONER

## 2023-07-29 PROCEDURE — 25000003 PHARM REV CODE 250: Performed by: NURSE PRACTITIONER

## 2023-07-29 PROCEDURE — 94761 N-INVAS EAR/PLS OXIMETRY MLT: CPT

## 2023-07-29 PROCEDURE — 94640 AIRWAY INHALATION TREATMENT: CPT

## 2023-07-29 RX ORDER — ASCORBIC ACID 500 MG
500 TABLET ORAL 2 TIMES DAILY
Status: DISCONTINUED | OUTPATIENT
Start: 2023-07-29 | End: 2023-08-16 | Stop reason: HOSPADM

## 2023-07-29 RX ORDER — POTASSIUM CHLORIDE 20 MEQ/1
40 TABLET, EXTENDED RELEASE ORAL
Status: COMPLETED | OUTPATIENT
Start: 2023-07-29 | End: 2023-07-29

## 2023-07-29 RX ORDER — LANOLIN ALCOHOL/MO/W.PET/CERES
800 CREAM (GRAM) TOPICAL EVERY 4 HOURS
Status: DISCONTINUED | OUTPATIENT
Start: 2023-07-29 | End: 2023-07-29

## 2023-07-29 RX ORDER — DEXTROSE MONOHYDRATE 100 MG/ML
INJECTION, SOLUTION INTRAVENOUS CONTINUOUS
Status: DISCONTINUED | OUTPATIENT
Start: 2023-07-29 | End: 2023-07-29

## 2023-07-29 RX ORDER — ALBUTEROL SULFATE 2.5 MG/.5ML
2.5 SOLUTION RESPIRATORY (INHALATION) 2 TIMES DAILY
Status: DISCONTINUED | OUTPATIENT
Start: 2023-07-29 | End: 2023-07-30

## 2023-07-29 RX ORDER — POTASSIUM CHLORIDE 20 MEQ/1
40 TABLET, EXTENDED RELEASE ORAL ONCE
Status: DISCONTINUED | OUTPATIENT
Start: 2023-07-29 | End: 2023-08-03

## 2023-07-29 RX ORDER — LANOLIN ALCOHOL/MO/W.PET/CERES
800 CREAM (GRAM) TOPICAL EVERY 4 HOURS
Status: COMPLETED | OUTPATIENT
Start: 2023-07-29 | End: 2023-07-29

## 2023-07-29 RX ORDER — BENZONATATE 100 MG/1
100 CAPSULE ORAL 3 TIMES DAILY PRN
Status: DISCONTINUED | OUTPATIENT
Start: 2023-07-29 | End: 2023-08-16 | Stop reason: HOSPADM

## 2023-07-29 RX ADMIN — CAPSAICIN: 0.25 CREAM TOPICAL at 08:07

## 2023-07-29 RX ADMIN — ATORVASTATIN CALCIUM 80 MG: 40 TABLET, FILM COATED ORAL at 08:07

## 2023-07-29 RX ADMIN — Medication 6 MG: at 10:07

## 2023-07-29 RX ADMIN — INSULIN ASPART 27 UNITS: 100 INJECTION, SUSPENSION SUBCUTANEOUS at 08:07

## 2023-07-29 RX ADMIN — APIXABAN 5 MG: 5 TABLET, FILM COATED ORAL at 08:07

## 2023-07-29 RX ADMIN — Medication 800 MG: at 05:07

## 2023-07-29 RX ADMIN — AMIODARONE HYDROCHLORIDE 200 MG: 200 TABLET ORAL at 08:07

## 2023-07-29 RX ADMIN — ALBUTEROL SULFATE 2.5 MG: 2.5 SOLUTION RESPIRATORY (INHALATION) at 09:07

## 2023-07-29 RX ADMIN — APIXABAN 5 MG: 5 TABLET, FILM COATED ORAL at 10:07

## 2023-07-29 RX ADMIN — FUROSEMIDE 40 MG: 10 INJECTION, SOLUTION INTRAVENOUS at 09:07

## 2023-07-29 RX ADMIN — GABAPENTIN 100 MG: 100 CAPSULE ORAL at 10:07

## 2023-07-29 RX ADMIN — Medication 800 MG: at 01:07

## 2023-07-29 RX ADMIN — OXYCODONE HYDROCHLORIDE AND ACETAMINOPHEN 500 MG: 500 TABLET ORAL at 10:07

## 2023-07-29 RX ADMIN — METOPROLOL SUCCINATE 25 MG: 25 TABLET, EXTENDED RELEASE ORAL at 08:07

## 2023-07-29 RX ADMIN — CAPSAICIN: 0.25 CREAM TOPICAL at 09:07

## 2023-07-29 RX ADMIN — POTASSIUM CHLORIDE 40 MEQ: 1500 TABLET, EXTENDED RELEASE ORAL at 05:07

## 2023-07-29 RX ADMIN — FUROSEMIDE 40 MG: 10 INJECTION, SOLUTION INTRAVENOUS at 10:07

## 2023-07-29 RX ADMIN — AMLODIPINE BESYLATE 10 MG: 5 TABLET ORAL at 08:07

## 2023-07-29 RX ADMIN — TAMSULOSIN HYDROCHLORIDE 0.4 MG: 0.4 CAPSULE ORAL at 08:07

## 2023-07-29 RX ADMIN — POTASSIUM CHLORIDE 40 MEQ: 1500 TABLET, EXTENDED RELEASE ORAL at 01:07

## 2023-07-29 RX ADMIN — GABAPENTIN 100 MG: 100 CAPSULE ORAL at 08:07

## 2023-07-29 NOTE — ASSESSMENT & PLAN NOTE
Prior to admission patient used a roller walker for ambulation at baseline.  Reportedly from patient he was unable to ambulate even with walker due to swelling of his legs, exam showed no difficulty with ROM.  PT/OT evaluation for decrease in mobility- completed  TN to assist with SNF

## 2023-07-29 NOTE — ASSESSMENT & PLAN NOTE
Lab Results   Component Value Date    HGBA1C 7.8 (H) 06/02/2023   Hypoglycemia this am and afternoon-symtomatic  Hold insulin  If persistent hypoglycemia, start d10 gtt

## 2023-07-29 NOTE — SUBJECTIVE & OBJECTIVE
Interval History: weak . Feels leg swelling still a lot. Denies cp or sob. Non productive cough. Wants to get better to go home.    Review of Systems   Constitutional:  Negative for chills and fever.   Eyes:  Negative for photophobia and visual disturbance.   Respiratory:  Negative for cough and shortness of breath.    Cardiovascular:  Positive for leg swelling. Negative for chest pain and palpitations.   Gastrointestinal:  Negative for abdominal pain, diarrhea, nausea and vomiting.   Genitourinary:  Negative for frequency, hematuria and urgency.   Musculoskeletal:  Positive for joint swelling and myalgias (BLE).   Skin:  Positive for color change, rash and wound. Negative for pallor.   Neurological:  Positive for weakness. Negative for light-headedness and headaches.   Psychiatric/Behavioral:  Negative for confusion and decreased concentration.      Objective:     Vital Signs (Most Recent):  Temp: 98.1 °F (36.7 °C) (07/29/23 1159)  Pulse: 70 (07/29/23 1159)  Resp: 18 (07/29/23 1159)  BP: (!) 161/74 (07/29/23 1159)  SpO2: 96 % (07/29/23 1159) Vital Signs (24h Range):  Temp:  [97.7 °F (36.5 °C)-98.1 °F (36.7 °C)] 98.1 °F (36.7 °C)  Pulse:  [65-70] 70  Resp:  [18] 18  SpO2:  [95 %-100 %] 96 %  BP: (140-178)/(66-80) 161/74     Weight: 98.9 kg (218 lb)  Body mass index is 30.4 kg/m².    Intake/Output Summary (Last 24 hours) at 7/29/2023 1337  Last data filed at 7/29/2023 1333  Gross per 24 hour   Intake 840 ml   Output 1100 ml   Net -260 ml         Physical Exam  Vitals and nursing note reviewed.   Constitutional:       General: He is not in acute distress.     Appearance: He is well-developed.   Cardiovascular:      Rate and Rhythm: Normal rate.   Pulmonary:      Effort: Pulmonary effort is normal. No respiratory distress.      Breath sounds: Normal breath sounds. No wheezing or rales.   Abdominal:      General: Bowel sounds are normal. There is no distension.      Palpations: Abdomen is soft.      Tenderness: There is  no abdominal tenderness.   Musculoskeletal:         General: No tenderness. Normal range of motion.      Cervical back: Normal range of motion and neck supple.      Right lower leg: Edema present.      Left lower leg: Edema present.      Comments: Anasarca noted up to pelvis/abdomen- still present but improving   Skin:     General: Skin is warm and dry.      Findings: Lesion and rash present.      Comments: See media for pictures from previous admission   1. Gluteal cleft/buttocks -denuded and excoriated  2. Right buttock shingles lesions- no vesicles on right buttock and in different healing stage.   3. Right lateral side just below axillary small blisters less then eraser pencil size  4. Right foot ventral aspect skin tear without drainage at this time.     Neurological:      Mental Status: He is alert and oriented to person, place, and time.   Psychiatric:         Thought Content: Thought content normal.             Significant Labs: All pertinent labs within the past 24 hours have been reviewed.    Significant Imaging: I have reviewed all pertinent imaging results/findings within the past 24 hours.

## 2023-07-29 NOTE — PLAN OF CARE
Problem: Adult Inpatient Plan of Care  Goal: Plan of Care Review  Outcome: Ongoing, Progressing  Flowsheets (Taken 7/29/2023 1413)  Plan of Care Reviewed With: patient  Goal: Patient-Specific Goal (Individualized)  Outcome: Ongoing, Progressing  Goal: Absence of Hospital-Acquired Illness or Injury  Outcome: Ongoing, Progressing  Intervention: Identify and Manage Fall Risk  Flowsheets (Taken 7/29/2023 1413)  Safety Promotion/Fall Prevention:   assistive device/personal item within reach   Fall Risk reviewed with patient/family   high risk medications identified   side rails raised x 2   nonskid shoes/socks when out of bed   instructed to call staff for mobility   medications reviewed   room near unit station   bed alarm set  Intervention: Prevent Skin Injury  Flowsheets (Taken 7/29/2023 1413)  Body Position:   weight shifting   turned  Skin Protection:   tubing/devices free from skin contact   adhesive use limited  Intervention: Prevent and Manage VTE (Venous Thromboembolism) Risk  Flowsheets (Taken 7/29/2023 1413)  Activity Management:   Arm raise - L1   Ankle pumps - L1   Rolling - L1  VTE Prevention/Management:   ambulation promoted   bleeding precations maintained   bleeding risk assessed  Range of Motion: active ROM (range of motion) encouraged  Intervention: Prevent Infection  Flowsheets (Taken 7/29/2023 1413)  Infection Prevention: hand hygiene promoted  Goal: Optimal Comfort and Wellbeing  Outcome: Ongoing, Progressing  Goal: Readiness for Transition of Care  Outcome: Ongoing, Progressing   Pt alert able to make needs known,jose meds well,IV Lasix remains in progress,no s/s adverse reaction noted,reposition self q 2hrs, no c/o pain ,POC explained,remains free from falls and pressure injuries,safety maintained,continue monitoring.

## 2023-07-29 NOTE — PT/OT/SLP PROGRESS
Physical Therapy Treatment    Patient Name:  Chato Bowie   MRN:  [id number]    Recommendations:     Discharge Recommendations: nursing facility, skilled  Discharge Equipment Recommendations: other (see comments) (TBD)      Assessment:     Chato Bowie is a 72 y.o. male admitted with a medical diagnosis of Chronic diastolic heart failure.  He presents with the following impairments/functional limitations: weakness, impaired endurance, impaired sensation, impaired self care skills, impaired functional mobility, gait instability, decreased lower extremity function, decreased safety awareness, pain, impaired cardiopulmonary response to activity, decreased coordination.    Rehab Prognosis: Good; patient would benefit from acute skilled PT services to address these deficits and reach maximum level of function.    Recent Surgery: * No surgery found *      Plan:     During this hospitalization, patient to be seen 5 x/week (Sat or Sun.) to address the identified rehab impairments via gait training, therapeutic activities, therapeutic exercises and progress toward the following goals:    Plan of Care Expires:  08/04/23    Subjective     Chief Complaint: Pt with no complaints or concerns at this time.   Patient/Family Comments/goals: Pt agreeable to PT treatment.   Pain/Comfort:  Pain Rating 1:  (not rated.)      Objective:   Patient found HOB elevated with PureWick, telemetry, pressure relief boots, peripheral IV upon PT entry to room.     General Precautions: Standard, diabetic, fall  Orthopedic Precautions: N/A  Braces: N/A  Respiratory Status: Room air     Functional Mobility:  Bed Mobility:     Supine to Sit: minimum assistance  Sit to Supine: minimum assistance  Transfers:     Sit to Stand:  minimum assistance with rolling walker  Gait: Pt was able to perform side stepping along EOB with RW, CGA requiring verbal cueing on proper AD placement as well as LE step sequencing.    Balance: Pt with good sitting and fair  standing balance.       AM-PAC 6 CLICK MOBILITY  Turning over in bed (including adjusting bedclothes, sheets and blankets)?: 3  Sitting down on and standing up from a chair with arms (e.g., wheelchair, bedside commode, etc.): 3  Moving from lying on back to sitting on the side of the bed?: 3  Moving to and from a bed to a chair (including a wheelchair)?: 2  Need to walk in hospital room?: 2  Climbing 3-5 steps with a railing?: 1  Basic Mobility Total Score: 14       Treatment & Education:      Patient left HOB elevated with all lines intact, call button in reach, and bed alarm on..    GOALS:   Multidisciplinary Problems       Physical Therapy Goals          Problem: Physical Therapy    Goal Priority Disciplines Outcome Goal Variances Interventions   Physical Therapy Goal     PT, PT/OT Ongoing, Progressing     Description: Goals to be met by: 23     Patient will increase functional independence with mobility by performin. Pt to be min A with bed mobility.  2. Pt to transfer with min A.  3. Pt to be able to tolerate standing with RW EOB; Pt to ambulate 15' w/RW min A.  4. Pt to be (I) with written HEP.                         Time Tracking:     PT Received On: 23  PT Start Time: 1029     PT Stop Time: 1050  PT Total Time (min): 21 min     Billable Minutes: Therapeutic Activity 21    Treatment Type: Treatment  PT/PTA: PTA     Number of PTA visits since last PT visit: 2023

## 2023-07-29 NOTE — PROGRESS NOTES
[facility] Medicine  Progress Note    Patient Name: Chato Bowie  MRN: [id number]  Patient Class: OP- Observation   Admission Date: 7/27/2023  Length of Stay: 0 days  Attending Physician: Raoul Morse MD  Primary Care Provider: Irlanda Valenzuela        Subjective:     Principal Problem:Chronic diastolic heart failure        HPI:  Chato Bowie 72 y.o. male with CHF, CAD, DM, HTN, HLD, presents to the hospital with a chief complaint of BLE swelling and pain.  onset several weeks ago per chart review, patient was recently discharged from the ED yesterday for similar complaints, and was discharged from inpatient status on 07/25/2023. the patient had endorsed no relief to his complaints since discharge. Patient currently reports complaints of BLE swelling with associated pain. He states his complaints have been ongoing for a few weeks, but had worsened as of recent. He additionally reports he last had similar complaints a month ago, noting he had to be admitted for a couple of nights. He endorses compliance with antihypertensives and diuretics. He reports following with his PCP at [facility], but reports he does not know who is his cardiologist. Denies fever, shortness of breath, or cough.  Patient's niece states that the patient does not live in assisted living however he does need it as she can not provide care for him especially due to him being COVID positive.  No other alleviating or exacerbating factors noted.    In the ED patient was hypertensive (181/140) afebrile without leukocytosis, mild normocytic anemia noted, BUN 46, creatinine 2.7, EGFR 24, glucose 136, albumin 2.3, , TSH WNL, COVID positive, flu negative, UA showed +1 protein, +2 occult blood, CXR was negative for acute process.  Patient was placed in observation      Overview/Hospital Course:  Admission to observation for acute on chronic diastolic heart failure. Patient also found positive COVID 19  -asymptomatic.    Very recent discharge from hospital on 7/25/23   72-year-old with extensive past medical history including CHF, arthritis, coronary artery disease, diabetes mellitus type 2, hypertension and hyperlipidemia admitted on 7/17/2023 for sepsis likely secondary to shingles and overlying cellulitis and acute kidney injury. Stated on IV antibiotics and continued on valacyclovir for shingles treatment. Wound care consulted. Also found to have elevated troponin and hypokalemia.  Electrolytes replaced as needed.  Suspect troponin elevation due to demand in the setting of sepsis and ELIZABETH.  ECG reviewed and read as unusual P axis. Has known LBBB. Recent echo with normal EF and grade II diastolic dysfunction.  Troponin trended down.  Patient without any chest pain or shortness of breath. ELIZABETH improving and leukocytosis resolved. PT/OT consulted-recommends SNF. Patient unable to care for himself, will likely need to transition to residential NH. Patient medically clear and stable for discharge to SNF. Awaiting placement.    CM attempted to get patient's financials and patient unwilling. Family unwilling to assist. Will get home with HH and have  assigned so he can get placed in NH if possible.  wound care ordered. Pain meds given.    Stop taking losartan   Start taking Amlodipine   Increase Hydralazine   Pain meds ordered for you (both Norco and RADHIKA)   Follow up with PCP and Nephrology   Wound care will come to your house, also PT/OT- please answer phone and door.          Interval History: weak . Feels leg swelling still a lot. Denies cp or sob. Non productive cough. Wants to get better to go home.    Review of Systems   Constitutional:  Negative for chills and fever.   Eyes:  Negative for photophobia and visual disturbance.   Respiratory:  Negative for cough and shortness of breath.    Cardiovascular:  Positive for leg swelling. Negative for chest pain and palpitations.   Gastrointestinal:   Negative for abdominal pain, diarrhea, nausea and vomiting.   Genitourinary:  Negative for frequency, hematuria and urgency.   Musculoskeletal:  Positive for joint swelling and myalgias (BLE).   Skin:  Positive for color change, rash and wound. Negative for pallor.   Neurological:  Positive for weakness. Negative for light-headedness and headaches.   Psychiatric/Behavioral:  Negative for confusion and decreased concentration.      Objective:     Vital Signs (Most Recent):  Temp: 98.1 °F (36.7 °C) (07/29/23 1159)  Pulse: 70 (07/29/23 1159)  Resp: 18 (07/29/23 1159)  BP: (!) 161/74 (07/29/23 1159)  SpO2: 96 % (07/29/23 1159) Vital Signs (24h Range):  Temp:  [97.7 °F (36.5 °C)-98.1 °F (36.7 °C)] 98.1 °F (36.7 °C)  Pulse:  [65-70] 70  Resp:  [18] 18  SpO2:  [95 %-100 %] 96 %  BP: (140-178)/(66-80) 161/74     Weight: 98.9 kg (218 lb)  Body mass index is 30.4 kg/m².    Intake/Output Summary (Last 24 hours) at 7/29/2023 1337  Last data filed at 7/29/2023 1333  Gross per 24 hour   Intake 840 ml   Output 1100 ml   Net -260 ml         Physical Exam  Vitals and nursing note reviewed.   Constitutional:       General: He is not in acute distress.     Appearance: He is well-developed.   Cardiovascular:      Rate and Rhythm: Normal rate.   Pulmonary:      Effort: Pulmonary effort is normal. No respiratory distress.      Breath sounds: Normal breath sounds. No wheezing or rales.   Abdominal:      General: Bowel sounds are normal. There is no distension.      Palpations: Abdomen is soft.      Tenderness: There is no abdominal tenderness.   Musculoskeletal:         General: No tenderness. Normal range of motion.      Cervical back: Normal range of motion and neck supple.      Right lower leg: Edema present.      Left lower leg: Edema present.      Comments: Anasarca noted up to pelvis/abdomen- still present but improving   Skin:     General: Skin is warm and dry.      Findings: Lesion and rash present.      Comments: See media for  pictures from previous admission   1. Gluteal cleft/buttocks -denuded and excoriated  2. Right buttock shingles lesions- no vesicles on right buttock and in different healing stage.   3. Right lateral side just below axillary small blisters less then eraser pencil size  4. Right foot ventral aspect skin tear without drainage at this time.     Neurological:      Mental Status: He is alert and oriented to person, place, and time.   Psychiatric:         Thought Content: Thought content normal.             Significant Labs: All pertinent labs within the past 24 hours have been reviewed.    Significant Imaging: I have reviewed all pertinent imaging results/findings within the past 24 hours.      Assessment/Plan:      * acute on chronic diastolic heart failure  Presents with increase BLE up to hips and abdomen edema inhibiting patient to ambulate. Recent 2 D echo in June HFpEF.   Continue IV diuresis      Debility Due to left knee pain and effusion due to gout? and/or injury  Prior to admission patient used a roller walker for ambulation at baseline.  Reportedly from patient he was unable to ambulate even with walker due to swelling of his legs, exam showed no difficulty with ROM.  PT/OT evaluation for decrease in mobility    Multiple wounds  1. Gluteal cleft - excoriation- Wound care consult from previous admission few days ago-   2. Right buttock lesion from shingles - no vesicles  3. Right foot ventral aspect skin tear- clean with vashe and keep clean JASON  4. Right lateral back below armput few less then eraser size blister. - keep clean dry  Cleanse with vashe and put hydrogel to right buttock   External jackson catheter to divert urine from wounds      Anemia in other chronic diseases classified elsewhere  Down compared to previous labs. Recent iron studies Ferritin 888 and low iron and T sat  Start iron therapy  Add vitamin b 12 and folate given history alcohol      COVID-19 virus infection  Non productive cough  otherwise asymptomatic .   Tessalon PRN  Albuterol inhaler bid    Class 1 obesity due to excess calories with serious comorbidity and body mass index (BMI) of 30.0 to 30.9 in adult  Body mass index is 30.4 kg/m². Morbid obesity complicates all aspects of disease management from diagnostic modalities to treatment. Weight loss encouraged and health benefits explained to patient.     DM (diabetes mellitus) type II controlled with renal manifestation  Lab Results   Component Value Date    HGBA1C 7.8 (H) 06/02/2023   Hypoglycemia this am and afternoon-symtomatic  Hold insulin  If persistent hypoglycemia, start d10 gtt       Anasarca  Due to HFpEF and CKD  See above #1      CKD (chronic kidney disease), stage IV  Stable, at baseline, maintain euvolemic state, strict I/O's, monitor renal function and electrolytes, avoid nephrotoxic agents.   Daily BMP    Paroxysmal atrial flutter  Continue apixaban    BPH (benign prostatic hyperplasia)  Chronic, stable, continue Flomax    Alcohol abuse  Patient has a history of alcohol abuse however denies any recent used in the past week.  Not showing any signs or symptoms of withdrawal at this time.  CIWA Q shift, monitor for withdrawals  Check ETOH      Type 2 diabetes mellitus with kidney complication, with long-term current use of insulin  Patient's FSGs are uncontrolled due to hyperglycemia on current medication regimen.  Last A1c reviewed-   Lab Results   Component Value Date    HGBA1C 7.8 (H) 06/02/2023     Most recent fingerstick glucose reviewed- No results for input(s): POCTGLUCOSE in the last 24 hours.  Current correctional scale  Medium  Maintain anti-hyperglycemic dose as follows-   Antihyperglycemics (From admission, onward)    Start     Stop Route Frequency Ordered    07/28/23 0715  insulin aspart protamine-insulin aspart (NovoLOG 70/30) injection         -- SubQ 2 times daily with meals 07/27/23 4600    07/28/23 0024  insulin aspart U-100 pen 1-10 Units         -- SubQ  Before meals & nightly PRN 07/27/23 2326        Hold Oral hypoglycemics while patient is in the hospital.    Dyslipidemia  No results found for: LDH, LDH   Chronic, stable, substitute for atorvastatin while in the hospital      Essential hypertension  BP poorly controlled while in the ED, continue home antihypertensives-amlodipine, hydralazine, metoprol   Prn hydralazine SBP >180    VTE Risk Mitigation (From admission, onward)         Ordered     apixaban tablet 5 mg  2 times daily         07/27/23 2326     Place MASOOD hose  Until discontinued         07/28/23 0719     IP VTE HIGH RISK PATIENT  Once         07/27/23 2326     Place sequential compression device  Until discontinued         07/27/23 2326     Reason for No Pharmacological VTE Prophylaxis  Once        Question:  Reasons:  Answer:  Already adequately anticoagulated on oral Anticoagulants    07/27/23 2326                Discharge Planning   ELY:      Code Status: Full Code   Is the patient medically ready for discharge?:     Reason for patient still in hospital (select all that apply): Treatment  Discharge Plan A: Other (TBD)                  Sierra Hernandez NP  Department of Hospital Medicine   [facility]

## 2023-07-29 NOTE — ASSESSMENT & PLAN NOTE
Presents with increase BLE up to hips and abdomen edema inhibiting patient to ambulate. Recent 2 D echo in June HFpEF.   Continue IV diuresis

## 2023-07-29 NOTE — ASSESSMENT & PLAN NOTE
1. Gluteal cleft - excoriation- Wound care consult from previous admission few days ago-   2. Right buttock lesion from shingles - no vesicles  3. Right foot ventral aspect skin tear- clean with vashe and keep clean JASON  4. Right lateral back below armput few less then eraser size blister. - keep clean dry  Cleanse with vashe and put hydrogel to right buttock   External jackson catheter to divert urine from wounds

## 2023-07-30 PROBLEM — M25.462 EFFUSION OF LEFT KNEE: Status: ACTIVE | Noted: 2023-07-30

## 2023-07-30 PROBLEM — E43 SEVERE MALNUTRITION: Status: ACTIVE | Noted: 2022-05-12

## 2023-07-30 LAB
ABO + RH BLD: NORMAL
ALBUMIN SERPL BCP-MCNC: 1.8 G/DL (ref 3.5–5.2)
ALP SERPL-CCNC: 55 U/L (ref 55–135)
ALT SERPL W/O P-5'-P-CCNC: 13 U/L (ref 10–44)
ANION GAP SERPL CALC-SCNC: 10 MMOL/L (ref 8–16)
APTT PPP: 33.3 SEC (ref 21–32)
AST SERPL-CCNC: 20 U/L (ref 10–40)
BASOPHILS # BLD AUTO: 0.04 K/UL (ref 0–0.2)
BASOPHILS NFR BLD: 0.6 % (ref 0–1.9)
BILIRUB SERPL-MCNC: 0.4 MG/DL (ref 0.1–1)
BLD GP AB SCN CELLS X3 SERPL QL: NORMAL
BLD PROD TYP BPU: NORMAL
BLOOD UNIT EXPIRATION DATE: NORMAL
BLOOD UNIT TYPE CODE: 7300
BLOOD UNIT TYPE: NORMAL
BUN SERPL-MCNC: 45 MG/DL (ref 8–23)
CALCIUM SERPL-MCNC: 8.2 MG/DL (ref 8.7–10.5)
CHLORIDE SERPL-SCNC: 101 MMOL/L (ref 95–110)
CK SERPL-CCNC: 44 U/L (ref 20–200)
CO2 SERPL-SCNC: 23 MMOL/L (ref 23–29)
CODING SYSTEM: NORMAL
CREAT SERPL-MCNC: 2.4 MG/DL (ref 0.5–1.4)
CROSSMATCH INTERPRETATION: NORMAL
D DIMER PPP IA.FEU-MCNC: 1.2 MG/L FEU
DIFFERENTIAL METHOD: ABNORMAL
DISPENSE STATUS: NORMAL
EOSINOPHIL # BLD AUTO: 0.1 K/UL (ref 0–0.5)
EOSINOPHIL NFR BLD: 1.7 % (ref 0–8)
ERYTHROCYTE [DISTWIDTH] IN BLOOD BY AUTOMATED COUNT: 14.3 % (ref 11.5–14.5)
ERYTHROCYTE [SEDIMENTATION RATE] IN BLOOD BY WESTERGREN METHOD: 130 MM/HR (ref 0–10)
EST. GFR  (NO RACE VARIABLE): 28 ML/MIN/1.73 M^2
FERRITIN SERPL-MCNC: 714 NG/ML (ref 20–300)
FOLATE SERPL-MCNC: 5.9 NG/ML (ref 4–24)
GLUCOSE SERPL-MCNC: 95 MG/DL (ref 70–110)
HCT VFR BLD AUTO: 19.8 % (ref 40–54)
HGB BLD-MCNC: 6.5 G/DL (ref 14–18)
HGB BLD-MCNC: 7.9 G/DL (ref 14–18)
IMM GRANULOCYTES # BLD AUTO: 0.1 K/UL (ref 0–0.04)
IMM GRANULOCYTES NFR BLD AUTO: 1.5 % (ref 0–0.5)
INR PPP: 1.3 (ref 0.8–1.2)
LACTATE SERPL-SCNC: 0.7 MMOL/L (ref 0.5–2.2)
LDH SERPL L TO P-CCNC: 228 U/L (ref 110–260)
LYMPHOCYTES # BLD AUTO: 1 K/UL (ref 1–4.8)
LYMPHOCYTES NFR BLD: 15.9 % (ref 18–48)
MAGNESIUM SERPL-MCNC: 1.3 MG/DL (ref 1.6–2.6)
MAGNESIUM SERPL-MCNC: 1.3 MG/DL (ref 1.6–2.6)
MCH RBC QN AUTO: 26.6 PG (ref 27–31)
MCHC RBC AUTO-ENTMCNC: 32.8 G/DL (ref 32–36)
MCV RBC AUTO: 81 FL (ref 82–98)
MONOCYTES # BLD AUTO: 0.9 K/UL (ref 0.3–1)
MONOCYTES NFR BLD: 13.6 % (ref 4–15)
NEUTROPHILS # BLD AUTO: 4.4 K/UL (ref 1.8–7.7)
NEUTROPHILS NFR BLD: 66.7 % (ref 38–73)
NRBC BLD-RTO: 0 /100 WBC
PLATELET # BLD AUTO: 246 K/UL (ref 150–450)
PMV BLD AUTO: 8.7 FL (ref 9.2–12.9)
POCT GLUCOSE: 115 MG/DL (ref 70–110)
POCT GLUCOSE: 212 MG/DL (ref 70–110)
POCT GLUCOSE: 316 MG/DL (ref 70–110)
POCT GLUCOSE: 343 MG/DL (ref 70–110)
POTASSIUM SERPL-SCNC: 4.1 MMOL/L (ref 3.5–5.1)
PROCALCITONIN SERPL IA-MCNC: 0.08 NG/ML
PROT SERPL-MCNC: 5.9 G/DL (ref 6–8.4)
PROTHROMBIN TIME: 13.6 SEC (ref 9–12.5)
RBC # BLD AUTO: 2.44 M/UL (ref 4.6–6.2)
SODIUM SERPL-SCNC: 134 MMOL/L (ref 136–145)
SPECIMEN OUTDATE: NORMAL
TRANS ERYTHROCYTES VOL PATIENT: NORMAL ML
WBC # BLD AUTO: 6.53 K/UL (ref 3.9–12.7)

## 2023-07-30 PROCEDURE — 83605 ASSAY OF LACTIC ACID: CPT | Performed by: NURSE PRACTITIONER

## 2023-07-30 PROCEDURE — 82728 ASSAY OF FERRITIN: CPT | Performed by: NURSE PRACTITIONER

## 2023-07-30 PROCEDURE — 36430 TRANSFUSION BLD/BLD COMPNT: CPT

## 2023-07-30 PROCEDURE — 63600175 PHARM REV CODE 636 W HCPCS

## 2023-07-30 PROCEDURE — 85730 THROMBOPLASTIN TIME PARTIAL: CPT | Performed by: NURSE PRACTITIONER

## 2023-07-30 PROCEDURE — 86900 BLOOD TYPING SEROLOGIC ABO: CPT | Performed by: NURSE PRACTITIONER

## 2023-07-30 PROCEDURE — 63600175 PHARM REV CODE 636 W HCPCS: Performed by: NURSE PRACTITIONER

## 2023-07-30 PROCEDURE — 82746 ASSAY OF FOLIC ACID SERUM: CPT | Performed by: NURSE PRACTITIONER

## 2023-07-30 PROCEDURE — 85379 FIBRIN DEGRADATION QUANT: CPT | Performed by: NURSE PRACTITIONER

## 2023-07-30 PROCEDURE — 27000207 HC ISOLATION

## 2023-07-30 PROCEDURE — 84145 PROCALCITONIN (PCT): CPT | Performed by: NURSE PRACTITIONER

## 2023-07-30 PROCEDURE — 85018 HEMOGLOBIN: CPT | Performed by: NURSE PRACTITIONER

## 2023-07-30 PROCEDURE — 25000242 PHARM REV CODE 250 ALT 637 W/ HCPCS: Performed by: NURSE PRACTITIONER

## 2023-07-30 PROCEDURE — 85652 RBC SED RATE AUTOMATED: CPT | Performed by: NURSE PRACTITIONER

## 2023-07-30 PROCEDURE — 11000001 HC ACUTE MED/SURG PRIVATE ROOM

## 2023-07-30 PROCEDURE — 80053 COMPREHEN METABOLIC PANEL: CPT | Performed by: NURSE PRACTITIONER

## 2023-07-30 PROCEDURE — 83735 ASSAY OF MAGNESIUM: CPT | Performed by: NURSE PRACTITIONER

## 2023-07-30 PROCEDURE — 36415 COLL VENOUS BLD VENIPUNCTURE: CPT | Performed by: NURSE PRACTITIONER

## 2023-07-30 PROCEDURE — 94640 AIRWAY INHALATION TREATMENT: CPT

## 2023-07-30 PROCEDURE — 94799 UNLISTED PULMONARY SVC/PX: CPT

## 2023-07-30 PROCEDURE — 86920 COMPATIBILITY TEST SPIN: CPT | Performed by: NURSE PRACTITIONER

## 2023-07-30 PROCEDURE — 25000003 PHARM REV CODE 250: Performed by: NURSE PRACTITIONER

## 2023-07-30 PROCEDURE — P9021 RED BLOOD CELLS UNIT: HCPCS | Performed by: NURSE PRACTITIONER

## 2023-07-30 PROCEDURE — 25000003 PHARM REV CODE 250

## 2023-07-30 PROCEDURE — 27000221 HC OXYGEN, UP TO 24 HOURS

## 2023-07-30 PROCEDURE — 94760 N-INVAS EAR/PLS OXIMETRY 1: CPT

## 2023-07-30 PROCEDURE — 85610 PROTHROMBIN TIME: CPT | Performed by: NURSE PRACTITIONER

## 2023-07-30 PROCEDURE — 82550 ASSAY OF CK (CPK): CPT | Performed by: NURSE PRACTITIONER

## 2023-07-30 PROCEDURE — 83615 LACTATE (LD) (LDH) ENZYME: CPT | Performed by: NURSE PRACTITIONER

## 2023-07-30 PROCEDURE — 85025 COMPLETE CBC W/AUTO DIFF WBC: CPT

## 2023-07-30 PROCEDURE — 83735 ASSAY OF MAGNESIUM: CPT | Mod: 91

## 2023-07-30 RX ORDER — ALBUTEROL SULFATE 2.5 MG/.5ML
2.5 SOLUTION RESPIRATORY (INHALATION) 2 TIMES DAILY
Status: DISCONTINUED | OUTPATIENT
Start: 2023-07-30 | End: 2023-08-04

## 2023-07-30 RX ORDER — SODIUM CHLORIDE 0.9 % (FLUSH) 0.9 %
10 SYRINGE (ML) INJECTION
Status: DISCONTINUED | OUTPATIENT
Start: 2023-07-30 | End: 2023-08-16 | Stop reason: HOSPADM

## 2023-07-30 RX ORDER — HYDROCODONE BITARTRATE AND ACETAMINOPHEN 500; 5 MG/1; MG/1
TABLET ORAL
Status: DISCONTINUED | OUTPATIENT
Start: 2023-07-30 | End: 2023-08-16 | Stop reason: HOSPADM

## 2023-07-30 RX ORDER — HYDROCODONE BITARTRATE AND ACETAMINOPHEN 5; 325 MG/1; MG/1
1 TABLET ORAL EVERY 8 HOURS PRN
Status: DISCONTINUED | OUTPATIENT
Start: 2023-07-30 | End: 2023-08-16 | Stop reason: HOSPADM

## 2023-07-30 RX ORDER — LIDOCAINE 50 MG/G
1 PATCH TOPICAL
Status: DISCONTINUED | OUTPATIENT
Start: 2023-07-30 | End: 2023-08-07

## 2023-07-30 RX ORDER — FOLIC ACID 1 MG/1
1 TABLET ORAL DAILY
Status: DISCONTINUED | OUTPATIENT
Start: 2023-07-30 | End: 2023-08-16 | Stop reason: HOSPADM

## 2023-07-30 RX ORDER — MAGNESIUM SULFATE HEPTAHYDRATE 40 MG/ML
2 INJECTION, SOLUTION INTRAVENOUS
Status: COMPLETED | OUTPATIENT
Start: 2023-07-30 | End: 2023-07-30

## 2023-07-30 RX ORDER — LANOLIN ALCOHOL/MO/W.PET/CERES
800 CREAM (GRAM) TOPICAL
Status: DISCONTINUED | OUTPATIENT
Start: 2023-07-30 | End: 2023-08-04

## 2023-07-30 RX ORDER — TRAMADOL HYDROCHLORIDE 50 MG/1
50 TABLET ORAL EVERY 8 HOURS PRN
Status: DISCONTINUED | OUTPATIENT
Start: 2023-07-30 | End: 2023-08-16 | Stop reason: HOSPADM

## 2023-07-30 RX ADMIN — CAPSAICIN: 0.25 CREAM TOPICAL at 10:07

## 2023-07-30 RX ADMIN — ALBUTEROL SULFATE 2.5 MG: 2.5 SOLUTION RESPIRATORY (INHALATION) at 08:07

## 2023-07-30 RX ADMIN — MAGNESIUM SULFATE HEPTAHYDRATE 2 G: 40 INJECTION, SOLUTION INTRAVENOUS at 10:07

## 2023-07-30 RX ADMIN — OXYCODONE HYDROCHLORIDE AND ACETAMINOPHEN 500 MG: 500 TABLET ORAL at 09:07

## 2023-07-30 RX ADMIN — ACETAMINOPHEN 650 MG: 325 TABLET ORAL at 06:07

## 2023-07-30 RX ADMIN — REMDESIVIR 200 MG: 100 INJECTION, POWDER, LYOPHILIZED, FOR SOLUTION INTRAVENOUS at 10:07

## 2023-07-30 RX ADMIN — GABAPENTIN 100 MG: 100 CAPSULE ORAL at 09:07

## 2023-07-30 RX ADMIN — INSULIN ASPART 4 UNITS: 100 INJECTION, SOLUTION INTRAVENOUS; SUBCUTANEOUS at 09:07

## 2023-07-30 RX ADMIN — POLYETHYLENE GLYCOL 3350 17 G: 17 POWDER, FOR SOLUTION ORAL at 09:07

## 2023-07-30 RX ADMIN — METOPROLOL SUCCINATE 25 MG: 25 TABLET, EXTENDED RELEASE ORAL at 09:07

## 2023-07-30 RX ADMIN — TAMSULOSIN HYDROCHLORIDE 0.4 MG: 0.4 CAPSULE ORAL at 09:07

## 2023-07-30 RX ADMIN — ATORVASTATIN CALCIUM 80 MG: 40 TABLET, FILM COATED ORAL at 09:07

## 2023-07-30 RX ADMIN — LIDOCAINE 5% 1 PATCH: 700 PATCH TOPICAL at 05:07

## 2023-07-30 RX ADMIN — INSULIN ASPART 4 UNITS: 100 INJECTION, SOLUTION INTRAVENOUS; SUBCUTANEOUS at 11:07

## 2023-07-30 RX ADMIN — INSULIN ASPART 8 UNITS: 100 INJECTION, SOLUTION INTRAVENOUS; SUBCUTANEOUS at 05:07

## 2023-07-30 RX ADMIN — FOLIC ACID 1 MG: 1 TABLET ORAL at 09:07

## 2023-07-30 RX ADMIN — CAPSAICIN: 0.25 CREAM TOPICAL at 09:07

## 2023-07-30 RX ADMIN — FUROSEMIDE 40 MG: 10 INJECTION, SOLUTION INTRAVENOUS at 09:07

## 2023-07-30 RX ADMIN — THERA TABS 1 TABLET: TAB at 09:07

## 2023-07-30 RX ADMIN — ALBUTEROL SULFATE 2.5 MG: 2.5 SOLUTION RESPIRATORY (INHALATION) at 03:07

## 2023-07-30 RX ADMIN — AMLODIPINE BESYLATE 10 MG: 5 TABLET ORAL at 09:07

## 2023-07-30 RX ADMIN — DEXAMETHASONE 6 MG: 2 TABLET ORAL at 09:07

## 2023-07-30 RX ADMIN — AMIODARONE HYDROCHLORIDE 200 MG: 200 TABLET ORAL at 09:07

## 2023-07-30 RX ADMIN — MAGNESIUM SULFATE HEPTAHYDRATE 2 G: 40 INJECTION, SOLUTION INTRAVENOUS at 12:07

## 2023-07-30 NOTE — NURSING
[facility]  Nurses Note -- 4 Eyes      7/29/2023       Skin assessed on: Q Shift      [] No Pressure Injuries Present    []Prevention Measures Documented    [x] Yes LDA  for Pressure Injury Previously documented     [] Yes New Pressure Injury Discovered   [] LDA for New Pressure Injury Added      Attending RN:  Mandi Watkins LPN     Second RN:  JOYCE Jones

## 2023-07-30 NOTE — PROGRESS NOTES
Ortho consult to see Pt with multiple medical problems, admitted with severe CHF worsening and COVID positive.  Chart reviewed and Pt has had chronic complaints pain in the left knee and 6 different X-rays of the knee over the past 3 months. All radiographs show severe osteoarthritis.    X-ray of hips ordered to be complete.   Pt left knee pain is from severe osteoarthritis. No Orthopedic intervention recommended during this hospitalization due to poor medical condition and COVID positive.   Ortho intervention for knee arthritis can be considered in outpatient setting once patient is more medially more stable.  Pt will be seen after hip Xray completed to confirm Dx and recs.    Addendum    Patient seen and examined.  Patient reports chronic pain in both knees for many years.  He reports she is been treated in the past with cortisone injections at [facility].  The left knee is worse for the past 2 months.  He does not ambulate.  He uses bed and wheelchair.    Afebrile vital signs stable  WBC normal    Examination shows bilateral knee flexion contractures 15-90 degrees.  Diffuse tenderness and crepitance with range of motion.  Mild effusion both knees present.  Hips have decreased range of motion with no pain.  New neurologically intact to lower extremities.  No sign of DVT.      Radiographs were ordered of knees and hips earlier today but have not yet been done     Patient has left knee pain and effusion due to osteoarthritis.  No evidence of infection to require immediate intervention.  Recommend treatment for arthritis.  Recommend medical support for CHF and COVID positive.  Outpatient treatment for osteoarthritis of the knees once patient is medically recovered.  Radiographs that have not yet been done will be reviewed in a.m.

## 2023-07-30 NOTE — ASSESSMENT & PLAN NOTE
Initially asymptomatic now with non productive cough, 7/29 night 89% improve with O2 2 LNC  COVID 19 protocol  Elevated D dimer, SED, CRP   Normal lactate - add procalcitonin   Repeat CXR showed increase opacities and small pleural effusions  Tessalon PRN  Albuterol inhaler bid  IS  Redemsivir/decadronWean oxygen to keep O2 sat >92%

## 2023-07-30 NOTE — ASSESSMENT & PLAN NOTE
Prior to admission patient used a roller walker for ambulation at baseline.  Patient unable to ambulate even with walker due to swelling of his legs and left knee pain  PT/OT evaluation for decrease in mobility- TN to assist with SNF

## 2023-07-30 NOTE — ASSESSMENT & PLAN NOTE
Lab Results   Component Value Date    HGBA1C 7.8 (H) 06/02/2023   Hypoglycemia resolved  Hold insulin and resume accordingly

## 2023-07-30 NOTE — ASSESSMENT & PLAN NOTE
X-ray on 07/17 shows significant suprapatellar effusion.  Previous admission in June orthopedic surgery attempted aspiration but no fluid then injected steroid with improvement in pain  No colchicine secondary to CKD  On Decadron for COVID-19 infection  Add uric acid however may be elevated in CKD  Consult orthopedic surgery for possible aspiration or steroid injection

## 2023-07-30 NOTE — ASSESSMENT & PLAN NOTE
Presents with increase BLE up to hips and abdomen edema inhibiting patient to ambulate. Recent 2 D echo in June HFpEF.   Edema improving. Severe malnutrition, covid 19 infection, and immobility also contributing to overall debilitating state  Continue IV diuresis - reassess to switch to oral lasix tomorrow 7/31

## 2023-07-30 NOTE — ASSESSMENT & PLAN NOTE
Hold eliquis for now as anticipate need for left knee aspiration  Continue amiodarone and metoprolol

## 2023-07-30 NOTE — PLAN OF CARE
Problem: Adult Inpatient Plan of Care  Goal: Plan of Care Review  7/30/2023 1443 by Mandi Watkins LPN  Outcome: Ongoing, Progressing  Flowsheets (Taken 7/30/2023 1440)  Plan of Care Reviewed With: patient  7/30/2023 1309 by Mandi Watkins LPN  Outcome: Ongoing, Progressing  Flowsheets (Taken 7/30/2023 1309)  Plan of Care Reviewed With: patient  Goal: Patient-Specific Goal (Individualized)  7/30/2023 1443 by Mandi Watkins LPN  Outcome: Ongoing, Progressing  7/30/2023 1309 by Mandi Watkins LPN  Outcome: Ongoing, Progressing  Goal: Absence of Hospital-Acquired Illness or Injury  7/30/2023 1443 by Mandi Watkins LPN  Outcome: Ongoing, Progressing  7/30/2023 1309 by Mandi Watkins LPN  Outcome: Ongoing, Progressing  Intervention: Identify and Manage Fall Risk  7/30/2023 1443 by Mandi Watkins LPN  Flowsheets (Taken 7/30/2023 1440)  Safety Promotion/Fall Prevention:   assistive device/personal item within reach   side rails raised x 2   nonskid shoes/socks when out of bed   high risk medications identified   medications reviewed   room near unit station   instructed to call staff for mobility  7/30/2023 1309 by Mandi Watkins LPN  Flowsheets (Taken 7/30/2023 1309)  Safety Promotion/Fall Prevention:   assistive device/personal item within reach   Fall Risk reviewed with patient/family   high risk medications identified   nonskid shoes/socks when out of bed   side rails raised x 2   room near unit station   instructed to call staff for mobility   medications reviewed  Intervention: Prevent Skin Injury  Flowsheets (Taken 7/30/2023 1309)  Body Position:   turned   weight shifting  Skin Protection:   adhesive use limited   tubing/devices free from skin contact  Intervention: Prevent and Manage VTE (Venous Thromboembolism) Risk  Flowsheets (Taken 7/30/2023 1309)  Activity Management:   Ankle pumps - L1   Arm raise - L1   Rolling - L1  VTE Prevention/Management:   ambulation promoted   bleeding precations maintained   bleeding risk  assessed  Range of Motion: active ROM (range of motion) encouraged  Intervention: Prevent Infection  Flowsheets (Taken 7/30/2023 1309)  Infection Prevention: hand hygiene promoted  Goal: Optimal Comfort and Wellbeing  7/30/2023 1443 by [facility], LPN  Outcome: Ongoing, Progressing  7/30/2023 1309 by [facility], LPN  Outcome: Ongoing, Progressing  Goal: Readiness for Transition of Care  7/30/2023 1443 by [facility], LPN  Outcome: Ongoing, Progressing  7/30/2023 1309 by [facility], LPN  Outcome: Ongoing, Progressing     Problem: Infection  Goal: Absence of Infection Signs and Symptoms  7/30/2023 1443 by [facility], LPN  Outcome: Ongoing, Progressing  7/30/2023 1309 by [facility], LPN  Outcome: Ongoing, Progressing  Intervention: Prevent or Manage Infection  Flowsheets (Taken 7/30/2023 1309)  Infection Management: aseptic technique maintained     Problem: Diabetes Comorbidity  Goal: Blood Glucose Level Within Targeted Range  7/30/2023 1443 by [facility], LPN  Outcome: Ongoing, Progressing  7/30/2023 1309 by [facility], LPN  Outcome: Ongoing, Progressing  Intervention: Monitor and Manage Glycemia  Flowsheets (Taken 7/30/2023 1309)  Glycemic Management: blood glucose monitored     Problem: Adjustment to Illness (Sepsis/Septic Shock)  Goal: Optimal Coping  Outcome: Ongoing, Progressing     Problem: Impaired Wound Healing  Goal: Optimal Wound Healing  7/30/2023 1443 by [facility], LPN  Outcome: Ongoing, Progressing  7/30/2023 1309 by [facility], LPN  Outcome: Ongoing, Progressing  Intervention: Promote Wound Healing  Flowsheets (Taken 7/30/2023 1309)  Sleep/Rest Enhancement: relaxation techniques promoted  Activity Management:   Ankle pumps - L1   Arm raise - L1   Rolling - L1  Pain Management Interventions:   pain management plan reviewed with patient/caregiver   relaxation techniques promoted     Problem: Skin Injury Risk Increased  Goal: Skin Health and Integrity  7/30/2023 1443 by [facility],  LPN  Outcome: Ongoing, Progressing  7/30/2023 1309 by Mandi Watkins LPN  Outcome: Ongoing, Progressing  Intervention: Optimize Skin Protection  Flowsheets (Taken 7/30/2023 1309)  Pressure Reduction Techniques:   frequent weight shift encouraged   weight shift assistance provided   pressure points protected   heels elevated off bed  Skin Protection:   adhesive use limited   tubing/devices free from skin contact  Head of Bed (HOB) Positioning: HOB at 30-45 degrees   Pt alert able to make needs known,jose meds well,IV Lasix remains in progress,no s/s adverse reaction noted,reposition self q 2hrs,pain controlled by prn pain medication,POC explained,remains free from falls and pressure injuries,safety maintained,continue monitoring.

## 2023-07-30 NOTE — CONSULTS
[facility]  Hospital Medicine  Telemedicine Consult Note       Virtual [facility] Medicine consulted for Chato Bowie to be followed through telemedicine modalities.  The patient is currently not appropriate for virtual visits due to still on IV diuretics with anasarca. Will need daily physicial exams.   Please re-consult once the patient is appropriate for virtual visits.      Nhung Martínez MD  Hospital Medicine Staff

## 2023-07-30 NOTE — ASSESSMENT & PLAN NOTE
1. Gluteal cleft - excoriation- Wound care consult from previous admission few days ago-   2. Right buttock lesion from shingles healing at different stages - no vesicles  3. Right foot ventral aspect skin tear- clean with vashe and keep clean JASON  4. Right lateral back below armput few less then eraser size blister. - keep clean dry  Cleanse with vashe and put hydrogel to right buttock lesions from shingles   External jackson catheter to divert urine from wounds

## 2023-07-30 NOTE — NURSING
[facility], [facility]  Nurses Note -- 4 Eyes      7/30/2023       Skin assessed on: Q Shift      [] No Pressure Injuries Present    []Prevention Measures Documented    [x] Yes LDA  for Pressure Injury Previously documented     [] Yes New Pressure Injury Discovered   [] LDA for New Pressure Injury Added      Attending RN:  Bernadine England RN     Second RN:  Mandi Watkins LPN

## 2023-07-30 NOTE — PROGRESS NOTES
[facility] Medicine  Progress Note    Patient Name: Chato Bowie  MRN: [id number]  Patient Class: OP- Observation   Admission Date: 7/27/2023  Length of Stay: 0 days  Attending Physician: Raoul Morse MD  Primary Care Provider: Irlanda Valenzuela        Subjective:     Principal Problem:Chronic diastolic heart failure        HPI:  Chato Bowie 72 y.o. male with CHF, CAD, DM, HTN, HLD, presents to the hospital with a chief complaint of BLE swelling and pain.  onset several weeks ago per chart review, patient was recently discharged from the ED yesterday for similar complaints, and was discharged from inpatient status on 07/25/2023. the patient had endorsed no relief to his complaints since discharge. Patient currently reports complaints of BLE swelling with associated pain. He states his complaints have been ongoing for a few weeks, but had worsened as of recent. He additionally reports he last had similar complaints a month ago, noting he had to be admitted for a couple of nights. He endorses compliance with antihypertensives and diuretics. He reports following with his PCP at [facility], but reports he does not know who is his cardiologist. Denies fever, shortness of breath, or cough.  Patient's niece states that the patient does not live in assisted living however he does need it as she can not provide care for him especially due to him being COVID positive.  No other alleviating or exacerbating factors noted.    In the ED patient was hypertensive (181/140) afebrile without leukocytosis, mild normocytic anemia noted, BUN 46, creatinine 2.7, EGFR 24, glucose 136, albumin 2.3, , TSH WNL, COVID positive, flu negative, UA showed +1 protein, +2 occult blood, CXR was negative for acute process.  Patient was placed in observation      Overview/Hospital Course:  Admission to hospital for BLE edema up hips due to acute on chronic diastolic heart failure and malnutrition. Patient also  found positive COVID 19 -asymptomatic on admission so Remdesivir discontinued. Edema improving with IV diuresis.  Became hypoxic night 7/29 requiring 2 L NC. Continue IV diuresis. Start Remdesivir/decadron (7/30), repeat CXR. Hypoglycemia (7/29) improved with oral intake and holding insulin.   He also have left  knee pain and edema similar to previous admission in June where Orthopedic surgery suspected gouty arthritis and attempted aspiration but only able to give steroid injection with improvement. No colchicine due to CKD. Left knee x ray on 7/17/23 showed significant suprapatellar effusion. Due to pain and decrease mobility, will ask Orthopedic Surgery to evaluate.  Hold apixaban  (last dose 7/29 2221).   Hgb treaded down 8.2>7.5>7.1>6.5 without over signs of bleeding. Had normal BM on 7/29 no melena or hematochezia. Previous iron studies low iron/t stat and elevated ferritin, normal vitamin b 12, and low folic acid. Start folic acid, ferrous sulfate and transfuse 1 unit of PRBC.   Recent admission 7/17-7/25 for sepsis due to shingles/cellulitis and ELIZABETH. Right buttock lesion from shingles are all in different part of healing phase without drainage. Also have gluteal cleft excoriation. Continue wound care as recommended by wound care team on previous admission-Cleanse both area with vashe, put mepilex to gluteal cleft, and put hydrogel to shingle lesion.   Also have right upper lateral back below axillary unroof blister and right foot ventral aspect skin tear. Keep clean and dry.     PT/OT evaluation completed. TN/SW to assist with SNF              Interval History: left knee pain 8/10. Non productive cough. Denies cp or sob.     Review of Systems   Constitutional:  Negative for chills and fever.   Eyes:  Negative for photophobia and visual disturbance.   Respiratory:  Positive for cough. Negative for shortness of breath.    Cardiovascular:  Positive for leg swelling. Negative for chest pain and palpitations.    Gastrointestinal:  Negative for abdominal pain, diarrhea, nausea and vomiting.   Genitourinary:  Negative for frequency, hematuria and urgency.   Musculoskeletal:  Positive for gait problem, joint swelling and myalgias (BLE).   Skin:  Positive for color change, rash and wound. Negative for pallor.   Neurological:  Positive for weakness. Negative for light-headedness and headaches.   Psychiatric/Behavioral:  Negative for confusion and decreased concentration.      Objective:     Vital Signs (Most Recent):  Temp: 99.5 °F (37.5 °C) (07/30/23 1058)  Pulse: (!) 55 (07/30/23 1058)  Resp: (!) 24 (07/30/23 1058)  BP: 121/61 (07/30/23 1058)  SpO2: 100 % (07/30/23 1058) Vital Signs (24h Range):  Temp:  [98.1 °F (36.7 °C)-102.4 °F (39.1 °C)] 99.5 °F (37.5 °C)  Pulse:  [55-72] 55  Resp:  [16-24] 24  SpO2:  [89 %-100 %] 100 %  BP: (121-153)/(58-72) 121/61     Weight: 98.9 kg (218 lb)  Body mass index is 30.4 kg/m².    Intake/Output Summary (Last 24 hours) at 7/30/2023 1239  Last data filed at 7/30/2023 1158  Gross per 24 hour   Intake 720 ml   Output 1000 ml   Net -280 ml         Physical Exam  Vitals and nursing note reviewed.   Constitutional:       General: He is not in acute distress.     Appearance: He is well-developed.   Cardiovascular:      Rate and Rhythm: Normal rate.   Pulmonary:      Effort: Pulmonary effort is normal. No respiratory distress.      Breath sounds: Normal breath sounds. No wheezing or rales.   Abdominal:      General: Bowel sounds are normal. There is no distension.      Palpations: Abdomen is soft.      Tenderness: There is no abdominal tenderness.   Musculoskeletal:         General: No tenderness. Normal range of motion.      Cervical back: Normal range of motion and neck supple.      Right lower leg: Edema present.      Left lower leg: Edema present.      Comments: Anasarca noted up to pelvis/abdomen- still present but improving   Skin:     General: Skin is warm and dry.      Findings: Lesion and  rash present.      Comments: See media for pictures from previous admission   1. Gluteal cleft/buttocks -denuded and excoriated  2. Right buttock shingles lesions- no vesicles on right buttock and in different healing stage.   3. Right lateral side just below axillary small blisters less then eraser pencil size  4. Right foot ventral aspect skin tear without drainage at this time.     Neurological:      Mental Status: He is alert and oriented to person, place, and time.   Psychiatric:         Thought Content: Thought content normal.             Significant Labs: All pertinent labs within the past 24 hours have been reviewed.    Significant Imaging: I have reviewed all pertinent imaging results/findings within the past 24 hours.      Assessment/Plan:      * acute on chronic diastolic heart failure  Presents with increase BLE up to hips and abdomen edema inhibiting patient to ambulate. Recent 2 D echo in June HFpEF.   Edema improving. Severe malnutrition, covid 19 infection, and immobility also contributing to overall debilitating state  Continue IV diuresis - reassess to switch to oral lasix tomorrow 7/31    Anemia  Hgb drop this admission and compared to last month hgb 10-11 range. Hgb 8.2>7.5>7.1 >6.5 today  BM last night no melena or hematochezia. Previous admission friable gluteal cleft bleed also noted.  3/2016 EGD normal esophagus and erythematous stomach and duodenum but no mention ulcers/erosions  CKD IV also contributing   Low iron and t stat elevated ferritin, normal vitamin b 12, low folic acid  Start folic acid, ferrous sulfate  No overt GI bleed . Holding eliquis as anticipate may need left knee aspiration  Transfuse 1 unit of PRBC   If continues to drop, continue to hold eliquis and consult GI  Add PPI      Debility Due to left knee pain and effusion due to gout? and/or injury  Prior to admission patient used a roller walker for ambulation at baseline.  Patient unable to ambulate even with walker due to  swelling of his legs and left knee pain  PT/OT evaluation for decrease in mobility- TN to assist with SNF    Anasarca  Due to HFpEF, CKD, malnutrition, COVID 19 infection and immobility- addressing these issues.       COVID-19 virus infection  Initially asymptomatic now with non productive cough, 7/29 night 89% improve with O2 2 LNC  COVID 19 protocol  Elevated D dimer, SED, CRP   Normal lactate - add procalcitonin   Repeat CXR showed increase opacities and small pleural effusions  Tessalon PRN  Albuterol inhaler bid  IS  Redemsivir/decadronWean oxygen to keep O2 sat >92%    Effusion of left knee  X-ray on 07/17 shows significant suprapatellar effusion.  Previous admission in June orthopedic surgery attempted aspiration but no fluid then injected steroid with improvement in pain  No colchicine secondary to CKD  On Decadron for COVID-19 infection  Add uric acid however may be elevated in CKD  Consult orthopedic surgery for possible aspiration or steroid injection    Multiple wounds  1. Gluteal cleft - excoriation- Wound care consult from previous admission few days ago-   2. Right buttock lesion from shingles healing at different stages - no vesicles  3. Right foot ventral aspect skin tear- clean with vashe and keep clean JASON  4. Right lateral back below armput few less then eraser size blister. - keep clean dry  Cleanse with vashe and put hydrogel to right buttock lesions from shingles   External jackson catheter to divert urine from wounds    Severe malnutrition  Add protein supplementations with meals    DM (diabetes mellitus) type II controlled with renal manifestation  Lab Results   Component Value Date    HGBA1C 7.8 (H) 06/02/2023   Hypoglycemia resolved  Hold insulin and resume accordingly         CKD (chronic kidney disease), stage IV  Baseline sCr 2.7-3.0. sCr 2.4 currently  Avoid nephrotoxins   Watch closely while diuresing      Paroxysmal atrial flutter  Hold eliquis for now as anticipate need for left knee  aspiration  Continue amiodarone and metoprolol    BPH (benign prostatic hyperplasia)  Chronic, stable, continue Flomax    Alcohol abuse  Patient has a history of alcohol abuse however denies any recent used in the past week.    No signs of DT this admission     Dyslipidemia  No results found for: LDH, LDH   Chronic, stable, substitute for atorvastatin while in the hospital      Essential hypertension  BP poorly controlled while in the ED now improve  Continue home antihypertensives-amlodipine, hydralazine, metoprolol with parameters  Prn hydralazine SBP >180    VTE Risk Mitigation (From admission, onward)         Ordered     Place MASOOD hose  Until discontinued         07/28/23 0719     IP VTE HIGH RISK PATIENT  Once         07/27/23 2326     Place sequential compression device  Until discontinued         07/27/23 2326     Reason for No Pharmacological VTE Prophylaxis  Once        Question:  Reasons:  Answer:  Already adequately anticoagulated on oral Anticoagulants    07/27/23 2326                Discharge Planning   ELY:      Code Status: Full Code   Is the patient medically ready for discharge?:     Reason for patient still in hospital (select all that apply): Treatment  Discharge Plan A: Other (TBD)          Sierra Hernandez NP  Department of Hospital Medicine   [facility] [place] [facility]

## 2023-07-30 NOTE — SUBJECTIVE & OBJECTIVE
Interval History: left knee pain 8/10. Non productive cough. Denies cp or sob.     Review of Systems   Constitutional:  Negative for chills and fever.   Eyes:  Negative for photophobia and visual disturbance.   Respiratory:  Positive for cough. Negative for shortness of breath.    Cardiovascular:  Positive for leg swelling. Negative for chest pain and palpitations.   Gastrointestinal:  Negative for abdominal pain, diarrhea, nausea and vomiting.   Genitourinary:  Negative for frequency, hematuria and urgency.   Musculoskeletal:  Positive for gait problem, joint swelling and myalgias (BLE).   Skin:  Positive for color change, rash and wound. Negative for pallor.   Neurological:  Positive for weakness. Negative for light-headedness and headaches.   Psychiatric/Behavioral:  Negative for confusion and decreased concentration.      Objective:     Vital Signs (Most Recent):  Temp: 99.5 °F (37.5 °C) (07/30/23 1058)  Pulse: (!) 55 (07/30/23 1058)  Resp: (!) 24 (07/30/23 1058)  BP: 121/61 (07/30/23 1058)  SpO2: 100 % (07/30/23 1058) Vital Signs (24h Range):  Temp:  [98.1 °F (36.7 °C)-102.4 °F (39.1 °C)] 99.5 °F (37.5 °C)  Pulse:  [55-72] 55  Resp:  [16-24] 24  SpO2:  [89 %-100 %] 100 %  BP: (121-153)/(58-72) 121/61     Weight: 98.9 kg (218 lb)  Body mass index is 30.4 kg/m².    Intake/Output Summary (Last 24 hours) at 7/30/2023 1239  Last data filed at 7/30/2023 1158  Gross per 24 hour   Intake 720 ml   Output 1000 ml   Net -280 ml         Physical Exam  Vitals and nursing note reviewed.   Constitutional:       General: He is not in acute distress.     Appearance: He is well-developed.   Cardiovascular:      Rate and Rhythm: Normal rate.   Pulmonary:      Effort: Pulmonary effort is normal. No respiratory distress.      Breath sounds: Normal breath sounds. No wheezing or rales.   Abdominal:      General: Bowel sounds are normal. There is no distension.      Palpations: Abdomen is soft.      Tenderness: There is no abdominal  tenderness.   Musculoskeletal:         General: No tenderness. Normal range of motion.      Cervical back: Normal range of motion and neck supple.      Right lower leg: Edema present.      Left lower leg: Edema present.      Comments: Anasarca noted up to pelvis/abdomen- still present but improving   Skin:     General: Skin is warm and dry.      Findings: Lesion and rash present.      Comments: See media for pictures from previous admission   1. Gluteal cleft/buttocks -denuded and excoriated  2. Right buttock shingles lesions- no vesicles on right buttock and in different healing stage.   3. Right lateral side just below axillary small blisters less then eraser pencil size  4. Right foot ventral aspect skin tear without drainage at this time.     Neurological:      Mental Status: He is alert and oriented to person, place, and time.   Psychiatric:         Thought Content: Thought content normal.             Significant Labs: All pertinent labs within the past 24 hours have been reviewed.    Significant Imaging: I have reviewed all pertinent imaging results/findings within the past 24 hours.

## 2023-07-30 NOTE — ASSESSMENT & PLAN NOTE
BP poorly controlled while in the ED now improve  Continue home antihypertensives-amlodipine, hydralazine, metoprolol with parameters  Prn hydralazine SBP >180

## 2023-07-30 NOTE — ASSESSMENT & PLAN NOTE
Patient has a history of alcohol abuse however denies any recent used in the past week.    No signs of DT this admission

## 2023-07-30 NOTE — ASSESSMENT & PLAN NOTE
Hgb drop this admission and compared to last month hgb 10-11 range. Hgb 8.2>7.5>7.1 >6.5 today  BM last night no melena or hematochezia. Previous admission friable gluteal cleft bleed also noted.  3/2016 EGD normal esophagus and erythematous stomach and duodenum but no mention ulcers/erosions  CKD IV also contributing   Low iron and t stat elevated ferritin, normal vitamin b 12, low folic acid  Start folic acid, ferrous sulfate  No overt GI bleed . Holding eliquis as anticipate may need left knee aspiration  Transfuse 1 unit of PRBC   If continues to drop, continue to hold eliquis and consult GI  Add PPI

## 2023-07-31 LAB
ALBUMIN SERPL BCP-MCNC: 1.8 G/DL (ref 3.5–5.2)
ALP SERPL-CCNC: 60 U/L (ref 55–135)
ALT SERPL W/O P-5'-P-CCNC: 15 U/L (ref 10–44)
ANION GAP SERPL CALC-SCNC: 11 MMOL/L (ref 8–16)
AST SERPL-CCNC: 25 U/L (ref 10–40)
BASOPHILS # BLD AUTO: 0.01 K/UL (ref 0–0.2)
BASOPHILS NFR BLD: 0.1 % (ref 0–1.9)
BILIRUB SERPL-MCNC: 0.4 MG/DL (ref 0.1–1)
BUN SERPL-MCNC: 58 MG/DL (ref 8–23)
CALCIUM SERPL-MCNC: 8.3 MG/DL (ref 8.7–10.5)
CHLORIDE SERPL-SCNC: 100 MMOL/L (ref 95–110)
CO2 SERPL-SCNC: 23 MMOL/L (ref 23–29)
CREAT SERPL-MCNC: 2.4 MG/DL (ref 0.5–1.4)
DIFFERENTIAL METHOD: ABNORMAL
EOSINOPHIL # BLD AUTO: 0 K/UL (ref 0–0.5)
EOSINOPHIL NFR BLD: 0 % (ref 0–8)
ERYTHROCYTE [DISTWIDTH] IN BLOOD BY AUTOMATED COUNT: 14.5 % (ref 11.5–14.5)
EST. GFR  (NO RACE VARIABLE): 28 ML/MIN/1.73 M^2
GLUCOSE SERPL-MCNC: 264 MG/DL (ref 70–110)
HCT VFR BLD AUTO: 23 % (ref 40–54)
HGB BLD-MCNC: 7.4 G/DL (ref 14–18)
IMM GRANULOCYTES # BLD AUTO: 0.13 K/UL (ref 0–0.04)
IMM GRANULOCYTES NFR BLD AUTO: 1.9 % (ref 0–0.5)
LYMPHOCYTES # BLD AUTO: 0.5 K/UL (ref 1–4.8)
LYMPHOCYTES NFR BLD: 7.4 % (ref 18–48)
MCH RBC QN AUTO: 27 PG (ref 27–31)
MCHC RBC AUTO-ENTMCNC: 32.2 G/DL (ref 32–36)
MCV RBC AUTO: 84 FL (ref 82–98)
MONOCYTES # BLD AUTO: 0.6 K/UL (ref 0.3–1)
MONOCYTES NFR BLD: 8.5 % (ref 4–15)
NEUTROPHILS # BLD AUTO: 5.5 K/UL (ref 1.8–7.7)
NEUTROPHILS NFR BLD: 82.1 % (ref 38–73)
NRBC BLD-RTO: 0 /100 WBC
PLATELET # BLD AUTO: 312 K/UL (ref 150–450)
PMV BLD AUTO: 8.9 FL (ref 9.2–12.9)
POCT GLUCOSE: 268 MG/DL (ref 70–110)
POCT GLUCOSE: 301 MG/DL (ref 70–110)
POCT GLUCOSE: 308 MG/DL (ref 70–110)
POCT GLUCOSE: 357 MG/DL (ref 70–110)
POTASSIUM SERPL-SCNC: 4.1 MMOL/L (ref 3.5–5.1)
PROT SERPL-MCNC: 6.1 G/DL (ref 6–8.4)
RBC # BLD AUTO: 2.74 M/UL (ref 4.6–6.2)
SODIUM SERPL-SCNC: 134 MMOL/L (ref 136–145)
WBC # BLD AUTO: 6.74 K/UL (ref 3.9–12.7)

## 2023-07-31 PROCEDURE — 94761 N-INVAS EAR/PLS OXIMETRY MLT: CPT

## 2023-07-31 PROCEDURE — 86580 TB INTRADERMAL TEST: CPT | Performed by: STUDENT IN AN ORGANIZED HEALTH CARE EDUCATION/TRAINING PROGRAM

## 2023-07-31 PROCEDURE — 25000003 PHARM REV CODE 250: Performed by: NURSE PRACTITIONER

## 2023-07-31 PROCEDURE — 97530 THERAPEUTIC ACTIVITIES: CPT

## 2023-07-31 PROCEDURE — 25000242 PHARM REV CODE 250 ALT 637 W/ HCPCS: Performed by: NURSE PRACTITIONER

## 2023-07-31 PROCEDURE — 25000003 PHARM REV CODE 250: Performed by: STUDENT IN AN ORGANIZED HEALTH CARE EDUCATION/TRAINING PROGRAM

## 2023-07-31 PROCEDURE — 85025 COMPLETE CBC W/AUTO DIFF WBC: CPT | Performed by: NURSE PRACTITIONER

## 2023-07-31 PROCEDURE — 94640 AIRWAY INHALATION TREATMENT: CPT

## 2023-07-31 PROCEDURE — 97535 SELF CARE MNGMENT TRAINING: CPT

## 2023-07-31 PROCEDURE — 99900035 HC TECH TIME PER 15 MIN (STAT)

## 2023-07-31 PROCEDURE — 63600175 PHARM REV CODE 636 W HCPCS: Performed by: STUDENT IN AN ORGANIZED HEALTH CARE EDUCATION/TRAINING PROGRAM

## 2023-07-31 PROCEDURE — 27000207 HC ISOLATION

## 2023-07-31 PROCEDURE — 27000221 HC OXYGEN, UP TO 24 HOURS

## 2023-07-31 PROCEDURE — 80053 COMPREHEN METABOLIC PANEL: CPT | Performed by: NURSE PRACTITIONER

## 2023-07-31 PROCEDURE — 63600175 PHARM REV CODE 636 W HCPCS

## 2023-07-31 PROCEDURE — 25000003 PHARM REV CODE 250

## 2023-07-31 PROCEDURE — 97110 THERAPEUTIC EXERCISES: CPT

## 2023-07-31 PROCEDURE — 63600175 PHARM REV CODE 636 W HCPCS: Mod: JZ,TB | Performed by: NURSE PRACTITIONER

## 2023-07-31 PROCEDURE — 36415 COLL VENOUS BLD VENIPUNCTURE: CPT | Performed by: NURSE PRACTITIONER

## 2023-07-31 PROCEDURE — 11000001 HC ACUTE MED/SURG PRIVATE ROOM

## 2023-07-31 PROCEDURE — 30200315 PPD INTRADERMAL TEST REV CODE 302: Performed by: STUDENT IN AN ORGANIZED HEALTH CARE EDUCATION/TRAINING PROGRAM

## 2023-07-31 RX ORDER — INSULIN ASPART 100 [IU]/ML
6 INJECTION, SOLUTION INTRAVENOUS; SUBCUTANEOUS
Status: DISCONTINUED | OUTPATIENT
Start: 2023-07-31 | End: 2023-08-02

## 2023-07-31 RX ADMIN — METOPROLOL SUCCINATE 25 MG: 25 TABLET, EXTENDED RELEASE ORAL at 09:07

## 2023-07-31 RX ADMIN — INSULIN ASPART 8 UNITS: 100 INJECTION, SOLUTION INTRAVENOUS; SUBCUTANEOUS at 09:07

## 2023-07-31 RX ADMIN — OXYCODONE HYDROCHLORIDE AND ACETAMINOPHEN 500 MG: 500 TABLET ORAL at 09:07

## 2023-07-31 RX ADMIN — GABAPENTIN 100 MG: 100 CAPSULE ORAL at 09:07

## 2023-07-31 RX ADMIN — AMLODIPINE BESYLATE 10 MG: 5 TABLET ORAL at 09:07

## 2023-07-31 RX ADMIN — INSULIN DETEMIR 10 UNITS: 100 INJECTION, SOLUTION SUBCUTANEOUS at 10:07

## 2023-07-31 RX ADMIN — Medication 6 MG: at 10:07

## 2023-07-31 RX ADMIN — INSULIN ASPART 6 UNITS: 100 INJECTION, SOLUTION INTRAVENOUS; SUBCUTANEOUS at 04:07

## 2023-07-31 RX ADMIN — FUROSEMIDE 40 MG: 10 INJECTION, SOLUTION INTRAVENOUS at 09:07

## 2023-07-31 RX ADMIN — INSULIN ASPART 10 UNITS: 100 INJECTION, SOLUTION INTRAVENOUS; SUBCUTANEOUS at 12:07

## 2023-07-31 RX ADMIN — AMIODARONE HYDROCHLORIDE 200 MG: 200 TABLET ORAL at 09:07

## 2023-07-31 RX ADMIN — TUBERCULIN PURIFIED PROTEIN DERIVATIVE 5 UNITS: 5 INJECTION, SOLUTION INTRADERMAL at 11:07

## 2023-07-31 RX ADMIN — INSULIN ASPART 3 UNITS: 100 INJECTION, SOLUTION INTRAVENOUS; SUBCUTANEOUS at 10:07

## 2023-07-31 RX ADMIN — DEXAMETHASONE 6 MG: 2 TABLET ORAL at 09:07

## 2023-07-31 RX ADMIN — THERA TABS 1 TABLET: TAB at 09:07

## 2023-07-31 RX ADMIN — INSULIN ASPART 6 UNITS: 100 INJECTION, SOLUTION INTRAVENOUS; SUBCUTANEOUS at 12:07

## 2023-07-31 RX ADMIN — OXYCODONE HYDROCHLORIDE AND ACETAMINOPHEN 500 MG: 500 TABLET ORAL at 10:07

## 2023-07-31 RX ADMIN — ALBUTEROL SULFATE 2.5 MG: 2.5 SOLUTION RESPIRATORY (INHALATION) at 08:07

## 2023-07-31 RX ADMIN — FUROSEMIDE 40 MG: 10 INJECTION, SOLUTION INTRAVENOUS at 10:07

## 2023-07-31 RX ADMIN — FOLIC ACID 1 MG: 1 TABLET ORAL at 09:07

## 2023-07-31 RX ADMIN — CAPSAICIN 1 APPLICATION: 0.25 CREAM TOPICAL at 10:07

## 2023-07-31 RX ADMIN — ATORVASTATIN CALCIUM 80 MG: 40 TABLET, FILM COATED ORAL at 09:07

## 2023-07-31 RX ADMIN — REMDESIVIR 100 MG: 100 INJECTION, POWDER, LYOPHILIZED, FOR SOLUTION INTRAVENOUS at 10:07

## 2023-07-31 RX ADMIN — GABAPENTIN 100 MG: 100 CAPSULE ORAL at 10:07

## 2023-07-31 RX ADMIN — CAPSAICIN: 0.25 CREAM TOPICAL at 10:07

## 2023-07-31 RX ADMIN — TAMSULOSIN HYDROCHLORIDE 0.4 MG: 0.4 CAPSULE ORAL at 09:07

## 2023-07-31 NOTE — PLAN OF CARE
Problem: Physical Therapy  Goal: Physical Therapy Goal  Description: Goals to be met by: 23     Patient will increase functional independence with mobility by performin. Pt to be min A with bed mobility.  2. Pt to transfer with min A.  3. Pt to be able to tolerate standing with RW EOB; Pt to ambulate 15' w/RW min A.  4. Pt to be (I) with written HEP.    Outcome: Ongoing, Progressing

## 2023-07-31 NOTE — PT/OT/SLP PROGRESS
"Physical Therapy Treatment    Patient Name:  Chato Bowie   MRN:  [id number]    Recommendations:     Discharge Recommendations: nursing facility, skilled  Discharge Equipment Recommendations: none  Barriers to discharge:  COVID+, pt is not functioning at Independent baseline and is at increased risk for falls and readmission if he returns to prior living arrangements at present     Assessment:     Chato Bowie is a 72 y.o. male admitted with a medical diagnosis of Chronic diastolic heart failure.  He presents with the following impairments/functional limitations: weakness, impaired endurance, decreased coordination, impaired functional mobility, decreased safety awareness, impaired balance, edema, pain .    Rehab Prognosis: Fair; patient would benefit from acute skilled PT services to address these deficits and reach maximum level of function.    Recent Surgery: * No surgery found *      Plan:     During this hospitalization, patient to be seen 6 x/week to address the identified rehab impairments via gait training, therapeutic activities, therapeutic exercises, neuromuscular re-education, wheelchair management/training and progress toward the following goals:    Plan of Care Expires:  08/04/23    Subjective     Chief Complaint: pain  Patient/Family Comments/goals: Pt agreeable to treatment, motivated to participate, "I did that by myself" referring to sit to stand  Pain/Comfort:  Pain Rating 1:  (not rated)  Location - Orientation 1:  (L knee)  Pain Addressed 1: Reposition, Distraction, Cessation of Activity (pain patch)      Objective:     Communicated with nsg prior to session.  Patient found sitting edge of bed with telemetry OT present upon PT entry to room.     General Precautions: Standard, fall, diabetic, airborne, contact, droplet  Orthopedic Precautions: N/A  Braces: N/A  Respiratory Status: Room air 98%     Functional Mobility:  Transfers:     Sit to Stand:  stand by assistance with VC/TC  Gait: Gait " "training with RW and SBA/CGA x 12' with Vc/TC for increased trunk extension and walker management/safety  Balance: Fair+ sit, fair+/FAir stand      AM-PAC 6 CLICK MOBILITY  Turning over in bed (including adjusting bedclothes, sheets and blankets)?: 3  Sitting down on and standing up from a chair with arms (e.g., wheelchair, bedside commode, etc.): 3  Moving from lying on back to sitting on the side of the bed?: 3  Moving to and from a bed to a chair (including a wheelchair)?: 3  Need to walk in hospital room?: 3  Climbing 3-5 steps with a railing?: 2  Basic Mobility Total Score: 17       Treatment & Education:  Pt sat at EOB with SAB and performed B AP's and  FAQ's with VC to perform correctly.  Pt performed sit to stand with B BR's, SBA and VC for forward weight shift over RADHA x 10 reps.  Handout provided to/reviewed with pt for LE TE to be performed throughout the day, pt educated to "call before you fall", and sit at EOB/OOB to chair with nursing staff for meals     Patient left sitting edge of bed with all lines intact and OT present..    GOALS:   Multidisciplinary Problems       Physical Therapy Goals          Problem: Physical Therapy    Goal Priority Disciplines Outcome Goal Variances Interventions   Physical Therapy Goal     PT, PT/OT Ongoing, Progressing     Description: Goals to be met by: 23     Patient will increase functional independence with mobility by performin. Pt to be min A with bed mobility.  2. Pt to transfer with min A.  3. Pt to be able to tolerate standing with RW EOB; Pt to ambulate 15' w/RW min A.  4. Pt to be (I) with written HEP.                         Time Tracking:     PT Received On: 23  PT Start Time: 160     PT Stop Time: 1619  PT Total Time (min): 10 min     Billable Minutes: Therapeutic Exercise 10     Treatment Type: Treatment  PT/PTA: PT     Number of PTA visits since last PT visit: 0     2023  "

## 2023-07-31 NOTE — PROGRESS NOTES
[facility] Medicine  Progress Note    Patient Name: Chato Bowie  MRN: [id number]  Patient Class: IP- Inpatient   Admission Date: 7/27/2023  Length of Stay: 1 days  Attending Physician: Sea Phelan MD  Primary Care Provider[facility]        Subjective:     Principal Problem:Chronic diastolic heart failure        HPI:  Chato Bowie 72 y.o. male with CHF, CAD, DM, HTN, HLD, presents to the hospital with a chief complaint of BLE swelling and pain.  onset several weeks ago per chart review, patient was recently discharged from the ED yesterday for similar complaints, and was discharged from inpatient status on 07/25/2023. the patient had endorsed no relief to his complaints since discharge. Patient currently reports complaints of BLE swelling with associated pain. He states his complaints have been ongoing for a few weeks, but had worsened as of recent. He additionally reports he last had similar complaints a month ago, noting he had to be admitted for a couple of nights. He endorses compliance with antihypertensives and diuretics. He reports following with his PCP at [facility], but reports he does not know who is his cardiologist. Denies fever, shortness of breath, or cough.  Patient's niece states that the patient does not live in assisted living however he does need it as she can not provide care for him especially due to him being COVID positive.  No other alleviating or exacerbating factors noted.    In the ED patient was hypertensive (181/140) afebrile without leukocytosis, mild normocytic anemia noted, BUN 46, creatinine 2.7, EGFR 24, glucose 136, albumin 2.3, , TSH WNL, COVID positive, flu negative, UA showed +1 protein, +2 occult blood, CXR was negative for acute process.  Patient was placed in observation      Overview/Hospital Course:  Admission to hospital for BLE edema up hips due to acute on chronic diastolic heart failure and malnutrition. Patient also found  positive COVID 19 -asymptomatic on admission so Remdesivir discontinued. Edema improving with IV diuresis.  Became hypoxic night 7/29 requiring 2 L NC. Started Remdesivir/decadron (7/30), repeat CXR. Hypoglycemia (7/29) improved with oral intake and holding insulin.   He also have left  knee pain and edema similar to previous admission in June where Orthopedic surgery suspected gouty arthritis and attempted aspiration but only able to give steroid injection with improvement. No colchicine due to CKD. Left knee x ray on 7/17/23 showed significant suprapatellar effusion. Due to pain and decrease mobility, will ask Orthopedic Surgery to evaluate.  Hold apixaban  (last dose 7/29 2221).   Hgb treaded down 8.2>7.5>7.1>6.5 without over signs of bleeding. Had normal BM on 7/29 no melena or hematochezia. Previous iron studies low iron/t stat and elevated ferritin, normal vitamin b 12, and low folic acid. Start folic acid, ferrous sulfate and transfuse 1 unit of PRBC.   Recent admission 7/17-7/25 for sepsis due to shingles/cellulitis and ELIZABETH. Right buttock lesion from shingles are all in different part of healing phase without drainage. Also have gluteal cleft excoriation. Continue wound care as recommended by wound care team on previous admission-Cleanse both area with vashe, put mepilex to gluteal cleft, and put hydrogel to shingle lesion.   Also have right upper lateral back below axillary unroof blister and right foot ventral aspect skin tear. Keep clean and dry.     PT/OT evaluation completed. TN/SW to assist with SNF.               Interval History: feeling better today, lower extremity swelling improving and oxygenation stable. Discussed SNF and he is in agreement    Review of Systems  Objective:     Vital Signs (Most Recent):  Temp: 97.6 °F (36.4 °C) (07/31/23 0750)  Pulse: 62 (07/31/23 0952)  Resp: (!) 22 (07/31/23 0830)  BP: (!) 145/68 (07/31/23 0952)  SpO2: 97 % (07/31/23 0830) Vital Signs (24h Range):  Temp:   [97.6 °F (36.4 °C)-99.5 °F (37.5 °C)] 97.6 °F (36.4 °C)  Pulse:  [53-64] 62  Resp:  [16-24] 22  SpO2:  [92 %-100 %] 97 %  BP: (120-153)/(58-72) 145/68     Weight: 98.9 kg (218 lb)  Body mass index is 30.4 kg/m².    Intake/Output Summary (Last 24 hours) at 7/31/2023 0959  Last data filed at 7/31/2023 0935  Gross per 24 hour   Intake 1275 ml   Output 2150 ml   Net -875 ml           Physical Exam  Vitals and nursing note reviewed.   Constitutional:       General: He is not in acute distress.     Appearance: He is well-developed. He is obese.   Cardiovascular:      Rate and Rhythm: Normal rate.   Pulmonary:      Effort: Pulmonary effort is normal. No respiratory distress.      Breath sounds: Normal breath sounds. No wheezing or rales.   Abdominal:      General: Bowel sounds are normal. There is no distension.      Palpations: Abdomen is soft.      Tenderness: There is no abdominal tenderness.   Musculoskeletal:         General: No tenderness. Normal range of motion.      Cervical back: Normal range of motion and neck supple.      Right lower leg: Edema present.      Left lower leg: Edema present.      Comments: Anasarca noted up to pelvis/abdomen  Feet/lower extremity edema improving but still with pitting edema extending up to abdomen   Skin:     General: Skin is warm and dry.      Findings: Lesion and rash present.      Comments: See media for pictures from previous admission   1. Gluteal cleft/buttocks -denuded and excoriated  2. Right buttock shingles lesions- no vesicles on right buttock and in different healing stage.   3. Right lateral side just below axillary small blisters less then eraser pencil size  4. Right foot ventral aspect skin tear without drainage at this time.      All stable/healing   Neurological:      Mental Status: He is alert and oriented to person, place, and time.   Psychiatric:         Thought Content: Thought content normal.             Significant Labs: All pertinent labs within the past 24  hours have been reviewed.    Significant Imaging: I have reviewed all pertinent imaging results/findings within the past 24 hours.      Assessment/Plan:      * acute on chronic diastolic heart failure  Presents with increase BLE up to hips and abdomen edema inhibiting patient to ambulate. Recent 2 D echo in June HFpEF.   Edema improving. Severe malnutrition, covid 19 infection, and immobility also contributing to overall debilitating state  Continue IV diuresis       Effusion of left knee  X-ray on 07/17 shows significant suprapatellar effusion.  Previous admission in June orthopedic surgery attempted aspiration but no fluid then injected steroid with improvement in pain  No colchicine secondary to CKD  On Decadron for COVID-19 infection  Add uric acid however may be elevated in CKD  Consult orthopedic surgery for possible aspiration or steroid injection    Multiple wounds  1. Gluteal cleft - excoriation- Wound care consult from previous admission few days ago-   2. Right buttock lesion from shingles healing at different stages - no vesicles  3. Right foot ventral aspect skin tear- clean with vashe and keep clean JASON  4. Right lateral back below armput few less then eraser size blister. - keep clean dry  Cleanse with vashe and put hydrogel to right buttock lesions from shingles   External jackson catheter is now leaking, he is alert/awake enough to use urinal at bedside      Anemia in other chronic diseases classified elsewhere        COVID-19 virus infection  Initially asymptomatic now with non productive cough, 7/29 night 89% improve with O2 2 LNC  COVID 19 protocol  Elevated D dimer, SED, CRP   Normal lactate - add procalcitonin 0.08  Repeat CXR showed increase opacities and small pleural effusions  Tessalon PRN  Albuterol inhaler bid  IS  Redemsivir/decadron  Wean oxygen to keep O2 sat >92%    Debility Due to left knee pain and effusion due to gout? and/or injury  Prior to admission patient used a roller walker  for ambulation at baseline.  Patient unable to ambulate even with walker due to swelling of his legs and left knee pain  PT/OT evaluation for decrease in mobility- TN to assist with SNF  - xrays pending per Orthopedic surgery    Severe malnutrition  Add protein supplementations with meals    DM (diabetes mellitus) type II controlled with renal manifestation  Lab Results   Component Value Date    HGBA1C 7.8 (H) 06/02/2023   Hypoglycemia resolved  Glucose uncontrolled with addition of dexamethasone  - start back on basal + bolus regimen         Anasarca  Due to HFpEF, CKD, malnutrition, COVID 19 infection and immobility- addressing these issues.       CKD (chronic kidney disease), stage IV  Baseline sCr 2.7-3.0. sCr 2.4 currently  Avoid nephrotoxins   Watch closely while diuresing      Paroxysmal atrial flutter  Hold eliquis for now as anticipate need for left knee aspiration  Continue amiodarone and metoprolol    BPH (benign prostatic hyperplasia)  Chronic, stable, continue Flomax    Alcohol abuse  Patient has a history of alcohol abuse however denies any recent used in the past week.    No signs of DT this admission         Anemia  Hgb drop this admission and compared to last month hgb 10-11 range. Hgb 8.2>7.5>7.1 >6.5 today  BM last night no melena or hematochezia. Previous admission friable gluteal cleft bleed also noted.  3/2016 EGD normal esophagus and erythematous stomach and duodenum but no mention ulcers/erosions  CKD IV also contributing   Low iron and t stat elevated ferritin, normal vitamin b 12, low folic acid  Start folic acid, ferrous sulfate  No overt GI bleed . Holding eliquis as anticipate may need left knee aspiration  Transfuse 1 unit of PRBC   If continues to drop, continue to hold eliquis and consult GI  Add PPI      Type 2 diabetes mellitus with kidney complication, with long-term current use of insulin  Patient's FSGs are uncontrolled due to hyperglycemia on current medication regimen.  Last  A1c reviewed-   Lab Results   Component Value Date    HGBA1C 7.8 (H) 06/02/2023     Most recent fingerstick glucose reviewed- No results for input(s): POCTGLUCOSE in the last 24 hours.  Current correctional scale  Medium  Maintain anti-hyperglycemic dose as follows-   Antihyperglycemics (From admission, onward)    Start     Stop Route Frequency Ordered    07/28/23 0715  insulin aspart protamine-insulin aspart (NovoLOG 70/30) injection         -- SubQ 2 times daily with meals 07/27/23 2326 07/28/23 0024  insulin aspart U-100 pen 1-10 Units         -- SubQ Before meals & nightly PRN 07/27/23 2326        Hold Oral hypoglycemics while patient is in the hospital.    Dyslipidemia  No results found for: LDH, LDH   Chronic, stable, substitute for atorvastatin while in the hospital      Essential hypertension  BP poorly controlled while in the ED now improve  Continue home antihypertensives-amlodipine, hydralazine, metoprolol with parameters  Prn hydralazine SBP >180      VTE Risk Mitigation (From admission, onward)         Ordered     Place MASOOD hose  Until discontinued         07/28/23 0719     IP VTE HIGH RISK PATIENT  Once         07/27/23 2326     Place sequential compression device  Until discontinued         07/27/23 2326     Reason for No Pharmacological VTE Prophylaxis  Once        Question:  Reasons:  Answer:  Already adequately anticoagulated on oral Anticoagulants    07/27/23 2326                Discharge Planning   ELY:      Code Status: Full Code   Is the patient medically ready for discharge?:     Reason for patient still in hospital (select all that apply): Treatment  Discharge Plan A: Skilled Nursing Facility   Discharge Delays: (!) Post-Acute Set-up              Sea Phelan MD  Department of Hospital Medicine   [facility]

## 2023-07-31 NOTE — ASSESSMENT & PLAN NOTE
Initially asymptomatic now with non productive cough, 7/29 night 89% improve with O2 2 LNC  COVID 19 protocol  Elevated D dimer, SED, CRP   Normal lactate - add procalcitonin 0.08  Repeat CXR showed increase opacities and small pleural effusions  Tessalon PRN  Albuterol inhaler bid  IS  Redemsivir/decadron  Wean oxygen to keep O2 sat >92%

## 2023-07-31 NOTE — SUBJECTIVE & OBJECTIVE
Interval History: feeling better today, lower extremity swelling improving and oxygenation stable. Discussed SNF and he is in agreement    Review of Systems  Objective:     Vital Signs (Most Recent):  Temp: 97.6 °F (36.4 °C) (07/31/23 0750)  Pulse: 62 (07/31/23 0952)  Resp: (!) 22 (07/31/23 0830)  BP: (!) 145/68 (07/31/23 0952)  SpO2: 97 % (07/31/23 0830) Vital Signs (24h Range):  Temp:  [97.6 °F (36.4 °C)-99.5 °F (37.5 °C)] 97.6 °F (36.4 °C)  Pulse:  [53-64] 62  Resp:  [16-24] 22  SpO2:  [92 %-100 %] 97 %  BP: (120-153)/(58-72) 145/68     Weight: 98.9 kg (218 lb)  Body mass index is 30.4 kg/m².    Intake/Output Summary (Last 24 hours) at 7/31/2023 0959  Last data filed at 7/31/2023 0935  Gross per 24 hour   Intake 1275 ml   Output 2150 ml   Net -875 ml           Physical Exam  Vitals and nursing note reviewed.   Constitutional:       General: He is not in acute distress.     Appearance: He is well-developed. He is obese.   Cardiovascular:      Rate and Rhythm: Normal rate.   Pulmonary:      Effort: Pulmonary effort is normal. No respiratory distress.      Breath sounds: Normal breath sounds. No wheezing or rales.   Abdominal:      General: Bowel sounds are normal. There is no distension.      Palpations: Abdomen is soft.      Tenderness: There is no abdominal tenderness.   Musculoskeletal:         General: No tenderness. Normal range of motion.      Cervical back: Normal range of motion and neck supple.      Right lower leg: Edema present.      Left lower leg: Edema present.      Comments: Anasarca noted up to pelvis/abdomen  Feet/lower extremity edema improving but still with pitting edema extending up to abdomen   Skin:     General: Skin is warm and dry.      Findings: Lesion and rash present.      Comments: See media for pictures from previous admission   1. Gluteal cleft/buttocks -denuded and excoriated  2. Right buttock shingles lesions- no vesicles on right buttock and in different healing stage.   3. Right  lateral side just below axillary small blisters less then eraser pencil size  4. Right foot ventral aspect skin tear without drainage at this time.      All stable/healing   Neurological:      Mental Status: He is alert and oriented to person, place, and time.   Psychiatric:         Thought Content: Thought content normal.             Significant Labs: All pertinent labs within the past 24 hours have been reviewed.    Significant Imaging: I have reviewed all pertinent imaging results/findings within the past 24 hours.

## 2023-07-31 NOTE — ASSESSMENT & PLAN NOTE
Lab Results   Component Value Date    HGBA1C 7.8 (H) 06/02/2023   Hypoglycemia resolved  Glucose uncontrolled with addition of dexamethasone  - start back on basal + bolus regimen

## 2023-07-31 NOTE — NURSING
Wound care performed on lesions to right flank. Cleansed with vashe and hydrogel applied as per order.

## 2023-07-31 NOTE — PLAN OF CARE
Problem: Adult Inpatient Plan of Care  Goal: Plan of Care Review  7/31/2023 1544 by Rasheeda Nguyen RN  Outcome: Ongoing, Progressing  7/31/2023 1544 by Rasheeda Nguyen RN  Outcome: Ongoing, Not Progressing  Goal: Patient-Specific Goal (Individualized)  7/31/2023 1544 by Rasheeda Nguyen RN  Outcome: Ongoing, Progressing  7/31/2023 1544 by Rasheeda Nguyen RN  Outcome: Ongoing, Not Progressing  Goal: Absence of Hospital-Acquired Illness or Injury  7/31/2023 1544 by Rasheeda Nguyen RN  Outcome: Ongoing, Progressing  7/31/2023 1544 by Rasheeda Nguyen RN  Outcome: Ongoing, Not Progressing  Goal: Optimal Comfort and Wellbeing  7/31/2023 1544 by Rasheeda Nguyen RN  Outcome: Ongoing, Progressing  7/31/2023 1544 by Rasheeda Nguyen RN  Outcome: Ongoing, Not Progressing  Goal: Readiness for Transition of Care  7/31/2023 1544 by Rasheeda Nguyen RN  Outcome: Ongoing, Progressing  7/31/2023 1544 by Rasheeda Nguyen RN  Outcome: Ongoing, Not Progressing     Problem: Infection  Goal: Absence of Infection Signs and Symptoms  7/31/2023 1544 by Rasheeda Nguyen RN  Outcome: Ongoing, Progressing  7/31/2023 1544 by Rasheeda Nguyen RN  Outcome: Ongoing, Not Progressing     Problem: Diabetes Comorbidity  Goal: Blood Glucose Level Within Targeted Range  7/31/2023 1544 by Rasheeda Nguyen RN  Outcome: Ongoing, Progressing  7/31/2023 1544 by Rasheeda Nguyen RN  Outcome: Ongoing, Not Progressing     Problem: Adjustment to Illness (Sepsis/Septic Shock)  Goal: Optimal Coping  7/31/2023 1544 by Rasheeda Nguyen RN  Outcome: Ongoing, Progressing  7/31/2023 1544 by Rasheeda Nguyen RN  Outcome: Ongoing, Not Progressing     Problem: Bleeding (Sepsis/Septic Shock)  Goal: Absence of Bleeding  7/31/2023 1544 by Rasheeda Nguyen RN  Outcome: Ongoing, Progressing  7/31/2023 1544 by Rasheeda Nguyen RN  Outcome: Ongoing, Not Progressing     Problem: Glycemic Control Impaired (Sepsis/Septic Shock)  Goal: Blood Glucose Level  Within Desired Range  7/31/2023 1544 by Rasheeda Nguyen RN  Outcome: Ongoing, Progressing  7/31/2023 1544 by Rasheeda Nguyen RN  Outcome: Ongoing, Not Progressing     Problem: Infection Progression (Sepsis/Septic Shock)  Goal: Absence of Infection Signs and Symptoms  7/31/2023 1544 by Rasheeda Nguyen RN  Outcome: Ongoing, Progressing  7/31/2023 1544 by Rasheeda Nguyen RN  Outcome: Ongoing, Not Progressing     Problem: Nutrition Impaired (Sepsis/Septic Shock)  Goal: Optimal Nutrition Intake  7/31/2023 1544 by Rasheeda Nguyen RN  Outcome: Ongoing, Progressing  7/31/2023 1544 by Rasheeda Nguyen RN  Outcome: Ongoing, Not Progressing     Problem: Fluid and Electrolyte Imbalance (Acute Kidney Injury/Impairment)  Goal: Fluid and Electrolyte Balance  7/31/2023 1544 by Rasheeda Nguyen RN  Outcome: Ongoing, Progressing  7/31/2023 1544 by Rasheeda Nguyen RN  Outcome: Ongoing, Not Progressing     Problem: Oral Intake Inadequate (Acute Kidney Injury/Impairment)  Goal: Optimal Nutrition Intake  7/31/2023 1544 by Rasheeda Nguyen RN  Outcome: Ongoing, Progressing  7/31/2023 1544 by Rasheeda Nguyen RN  Outcome: Ongoing, Not Progressing     Problem: Renal Function Impairment (Acute Kidney Injury/Impairment)  Goal: Effective Renal Function  7/31/2023 1544 by Rasheeda Nguyen RN  Outcome: Ongoing, Progressing  7/31/2023 1544 by Rasheeda Nguyen RN  Outcome: Ongoing, Not Progressing     Problem: Impaired Wound Healing  Goal: Optimal Wound Healing  7/31/2023 1544 by Rasheeda Nguyen RN  Outcome: Ongoing, Progressing  7/31/2023 1544 by Rasheeda Nguyen RN  Outcome: Ongoing, Not Progressing     Problem: Skin Injury Risk Increased  Goal: Skin Health and Integrity  7/31/2023 1544 by Rasheeda Nguyen RN  Outcome: Ongoing, Progressing  7/31/2023 1544 by Rasheeda Nguyen RN  Outcome: Ongoing, Not Progressing

## 2023-07-31 NOTE — ASSESSMENT & PLAN NOTE
Due to HFpEF, CKD, malnutrition, COVID 19 infection and immobility- addressing these issues.      Patient called and cx her appt with you will call back if needed seeing Dr Mendy Moreno in [facility] and cardiologist they have a program for high risk pregnancy

## 2023-07-31 NOTE — ASSESSMENT & PLAN NOTE
Prior to admission patient used a roller walker for ambulation at baseline.  Patient unable to ambulate even with walker due to swelling of his legs and left knee pain  PT/OT evaluation for decrease in mobility- TN to assist with SNF  - xrays pending per Orthopedic surgery

## 2023-07-31 NOTE — PT/OT/SLP PROGRESS
"Occupational Therapy   Treatment    Name: Chato Bowie  MRN: [id number]  Admitting Diagnosis:  Chronic diastolic heart failure       Recommendations:     Discharge Recommendations: nursing facility, skilled  Discharge Equipment Recommendations:  none  Barriers to discharge:  Other (Comment) (he cannot take care of himself; he has had several recent hospital readmissions)    Assessment:     Chato Bowie is a 72 y.o. male with a medical diagnosis of Chronic diastolic heart failure.  He presents with HOB elevated, asleep, but awakened easily and did not c/o left knee pain and had lidocaine patch on dorsal knee. Performance deficits affecting function are weakness, impaired endurance, impaired self care skills, impaired functional mobility, gait instability, impaired balance, decreased lower extremity function, decreased safety awareness, pain, decreased ROM, impaired cardiopulmonary response to activity.     Rehab Prognosis:  Good; patient would benefit from acute skilled OT services to address these deficits and reach maximum level of function.       Plan:     Patient to be seen 5 x/week to address the above listed problems via self-care/home management, therapeutic activities, therapeutic exercises  Plan of Care Expires: 08/18/23  Plan of Care Reviewed with: patient    Subjective     Chief Complaint: weakness   Patient/Family Comments/goals: "I still do not feel well, but better than Friday."   Pain/Comfort:  Pain Rating 1: 0/10  Pain Addressed 1: Other (see comments) (pain patch on L knee)    Objective:     Communicated with: RNRasheeda, prior to session.  Patient found HOB elevated with telemetry, oxygen, pressure relief boots, peripheral IV upon OT entry to room.    General Precautions: Standard, droplet, airborne, contact, diabetic, respiratory    Orthopedic Precautions:N/A  Braces: N/A  Respiratory Status: Nasal cannula, flow 1 L/min     Occupational Performance:  patient lives alone and has a niece who " intermittently visits     Bed Mobility:    Patient completed Rolling/Turning to Left with standby assistance  Patient completed Scooting/Bridging with standby assistance  Patient completed Supine to Sit with stand by assistance     Functional Mobility/Transfers:  Patient completed Sit <> Stand Transfer with CGA assistance  with  rolling walker with PT   Functional Mobility: Patient walked a few feet around the bed from left to right side with RW and SBA with PT.     Activities of Daily Living:  Grooming: stand by assistance seated EOB   Upper Body Dressing: stand by assistance with gown   Toileting: total assistance perineal hygiene and to remove soiled linens; has large sore in sacral area       [facility] 6 Click ADL: 17    Treatment & Education:  Patient sat EOB to groom after doing sit to stand t/f and walking around bed. Occupational therapy will review UB therex with him tomorrow.   Pt. At 98% on room air after getting OOB and sitting EOB on room air x 30 min.     Patient left sitting edge of bed with all lines intact, call button in reach, and RN  notified    GOALS:   Multidisciplinary Problems       Occupational Therapy Goals          Problem: Occupational Therapy    Goal Priority Disciplines Outcome Interventions   Occupational Therapy Goal     OT, PT/OT Ongoing, Progressing    Description: Goals to be met by: 8/18/23     Patient will increase functional independence with ADLs by performing:    UE Dressing with [place].  LE Dressing with Modified [place].  Grooming while seated at sink with [place].  Toileting from toilet with Modified [place] for hygiene and clothing management.   Bathing from  edge of bed with Minimal Assistance.  Toilet transfer to toilet with Modified [place].  Upper extremity exercise program x10-15 reps per handout, with supervision.      Patient seen by occupational therapy for initial evaluation, so see notes for more info. Patient is having difficulty taking  care of himself at home, so needs SNF care. Occupational therapy to see 5x/week.                        Time Tracking:     OT Date of Treatment: 07/31/23  OT Start Time: 1557  OT Stop Time: 1630  OT Total Time (min): 33 min    Billable Minutes:Self Care/Home Management 18  Therapeutic Activity 15    OT/JASON: OT          7/31/2023

## 2023-07-31 NOTE — ASSESSMENT & PLAN NOTE
1. Gluteal cleft - excoriation- Wound care consult from previous admission few days ago-   2. Right buttock lesion from shingles healing at different stages - no vesicles  3. Right foot ventral aspect skin tear- clean with vashe and keep clean JASON  4. Right lateral back below armput few less then eraser size blister. - keep clean dry  Cleanse with vashe and put hydrogel to right buttock lesions from shingles   External jackson catheter is now leaking, he is alert/awake enough to use urinal at bedside

## 2023-07-31 NOTE — ASSESSMENT & PLAN NOTE
Presents with increase BLE up to hips and abdomen edema inhibiting patient to ambulate. Recent 2 D echo in June HFpEF.   Edema improving. Severe malnutrition, covid 19 infection, and immobility also contributing to overall debilitating state  Continue IV diuresis

## 2023-07-31 NOTE — NURSING
[facility], [facility] [place]  Nurses Note -- 4 Eyes      7/31/2023       Skin assessed on: Q Shift      [] No Pressure Injuries Present    []Prevention Measures Documented    [x] Yes LDA  for Pressure Injury Previously documented     [] Yes New Pressure Injury Discovered   [] LDA for New Pressure Injury Added      Attending RN:  Bernadine England RN     Second RN:  Mandi Watkins LPN

## 2023-07-31 NOTE — PLAN OF CARE
[facility] sent referrals to SNF accepting patients with Covid. PASRR 142 uploaded to [employer].     2:00 pm     SW spoke to patient's niece, Leigh about discharge planning. SW explained that PT/OT recommended SNF and patient told Dr. Phelan he would like to go. SW explained that referrals were sent to facilities that were accepting Covid patients. [facility] informed that patient was accepted by [facility] and [facility]. Leigh said that she would have preferred that patient was on the WB but understood. [facility] informed Leigh that a message will be sent to them informing that family has accepted placement. [facility] informed Leigh that they can also email paperwork to her. Leigh stated that she will wait for their call.        07/31/23 1027   Post-Acute Status   Post-Acute Authorization Placement  (SNF)   Post-Acute Placement Status Pending post-acute provider review/more information requested   Discharge Delays (!) Post-Acute Set-up   Discharge Plan   Discharge Plan A Skilled Nursing Facility   Discharge Plan B Home Health

## 2023-07-31 NOTE — PLAN OF CARE
Problem: Occupational Therapy  Goal: Occupational Therapy Goal  Description: Goals to be met by: 8/18/23     Patient will increase functional independence with ADLs by performing:    UE Dressing with [place].  LE Dressing with Modified [place].  Grooming while seated at sink with [place].  Toileting from toilet with Modified [place] for hygiene and clothing management.   Bathing from  edge of bed with Minimal Assistance.  Toilet transfer to toilet with Modified [place].  Upper extremity exercise program x10-15 reps per handout, with supervision.    Outcome: Ongoing, Progressing    Patient sat EOB to groom with setup  Pt. Still needed total assistance with perineal hygiene for small BM In bed to remove linens

## 2023-08-01 LAB
ANION GAP SERPL CALC-SCNC: 8 MMOL/L (ref 8–16)
BUN SERPL-MCNC: 67 MG/DL (ref 8–23)
CALCIUM SERPL-MCNC: 8.4 MG/DL (ref 8.7–10.5)
CHLORIDE SERPL-SCNC: 100 MMOL/L (ref 95–110)
CO2 SERPL-SCNC: 26 MMOL/L (ref 23–29)
CREAT SERPL-MCNC: 2.4 MG/DL (ref 0.5–1.4)
D DIMER PPP IA.FEU-MCNC: 0.68 MG/L FEU
EST. GFR  (NO RACE VARIABLE): 28 ML/MIN/1.73 M^2
GLUCOSE SERPL-MCNC: 305 MG/DL (ref 70–110)
LDH SERPL L TO P-CCNC: 236 U/L (ref 110–260)
POCT GLUCOSE: 391 MG/DL (ref 70–110)
POCT GLUCOSE: 404 MG/DL (ref 70–110)
POCT GLUCOSE: 416 MG/DL (ref 70–110)
POCT GLUCOSE: 498 MG/DL (ref 70–110)
POTASSIUM SERPL-SCNC: 3.8 MMOL/L (ref 3.5–5.1)
SODIUM SERPL-SCNC: 134 MMOL/L (ref 136–145)

## 2023-08-01 PROCEDURE — 25000003 PHARM REV CODE 250: Performed by: NURSE PRACTITIONER

## 2023-08-01 PROCEDURE — 97116 GAIT TRAINING THERAPY: CPT | Mod: CQ

## 2023-08-01 PROCEDURE — 85379 FIBRIN DEGRADATION QUANT: CPT | Performed by: NURSE PRACTITIONER

## 2023-08-01 PROCEDURE — 80048 BASIC METABOLIC PNL TOTAL CA: CPT | Performed by: STUDENT IN AN ORGANIZED HEALTH CARE EDUCATION/TRAINING PROGRAM

## 2023-08-01 PROCEDURE — 94760 N-INVAS EAR/PLS OXIMETRY 1: CPT

## 2023-08-01 PROCEDURE — 97110 THERAPEUTIC EXERCISES: CPT | Mod: CQ

## 2023-08-01 PROCEDURE — 25000003 PHARM REV CODE 250: Performed by: STUDENT IN AN ORGANIZED HEALTH CARE EDUCATION/TRAINING PROGRAM

## 2023-08-01 PROCEDURE — 63600175 PHARM REV CODE 636 W HCPCS: Performed by: NURSE PRACTITIONER

## 2023-08-01 PROCEDURE — 97110 THERAPEUTIC EXERCISES: CPT

## 2023-08-01 PROCEDURE — 11000001 HC ACUTE MED/SURG PRIVATE ROOM

## 2023-08-01 PROCEDURE — 94640 AIRWAY INHALATION TREATMENT: CPT

## 2023-08-01 PROCEDURE — 27000207 HC ISOLATION

## 2023-08-01 PROCEDURE — 25000003 PHARM REV CODE 250

## 2023-08-01 PROCEDURE — 83615 LACTATE (LD) (LDH) ENZYME: CPT | Performed by: NURSE PRACTITIONER

## 2023-08-01 PROCEDURE — 25000242 PHARM REV CODE 250 ALT 637 W/ HCPCS: Performed by: NURSE PRACTITIONER

## 2023-08-01 PROCEDURE — 27000221 HC OXYGEN, UP TO 24 HOURS

## 2023-08-01 PROCEDURE — 63600175 PHARM REV CODE 636 W HCPCS

## 2023-08-01 RX ADMIN — AMIODARONE HYDROCHLORIDE 200 MG: 200 TABLET ORAL at 09:08

## 2023-08-01 RX ADMIN — APIXABAN 5 MG: 5 TABLET, FILM COATED ORAL at 08:08

## 2023-08-01 RX ADMIN — INSULIN ASPART 10 UNITS: 100 INJECTION, SOLUTION INTRAVENOUS; SUBCUTANEOUS at 09:08

## 2023-08-01 RX ADMIN — INSULIN DETEMIR 45 UNITS: 100 INJECTION, SOLUTION SUBCUTANEOUS at 04:08

## 2023-08-01 RX ADMIN — GABAPENTIN 100 MG: 100 CAPSULE ORAL at 09:08

## 2023-08-01 RX ADMIN — INSULIN ASPART 6 UNITS: 100 INJECTION, SOLUTION INTRAVENOUS; SUBCUTANEOUS at 04:08

## 2023-08-01 RX ADMIN — OXYCODONE HYDROCHLORIDE AND ACETAMINOPHEN 500 MG: 500 TABLET ORAL at 08:08

## 2023-08-01 RX ADMIN — INSULIN DETEMIR 15 UNITS: 100 INJECTION, SOLUTION SUBCUTANEOUS at 09:08

## 2023-08-01 RX ADMIN — FUROSEMIDE 40 MG: 10 INJECTION, SOLUTION INTRAVENOUS at 09:08

## 2023-08-01 RX ADMIN — CAPSAICIN: 0.25 CREAM TOPICAL at 08:08

## 2023-08-01 RX ADMIN — OXYCODONE HYDROCHLORIDE AND ACETAMINOPHEN 500 MG: 500 TABLET ORAL at 09:08

## 2023-08-01 RX ADMIN — INSULIN ASPART 6 UNITS: 100 INJECTION, SOLUTION INTRAVENOUS; SUBCUTANEOUS at 11:08

## 2023-08-01 RX ADMIN — AMLODIPINE BESYLATE 10 MG: 5 TABLET ORAL at 09:08

## 2023-08-01 RX ADMIN — FOLIC ACID 1 MG: 1 TABLET ORAL at 09:08

## 2023-08-01 RX ADMIN — ATORVASTATIN CALCIUM 80 MG: 40 TABLET, FILM COATED ORAL at 09:08

## 2023-08-01 RX ADMIN — INSULIN ASPART 10 UNITS: 100 INJECTION, SOLUTION INTRAVENOUS; SUBCUTANEOUS at 11:08

## 2023-08-01 RX ADMIN — GABAPENTIN 100 MG: 100 CAPSULE ORAL at 08:08

## 2023-08-01 RX ADMIN — ALBUTEROL SULFATE 2.5 MG: 2.5 SOLUTION RESPIRATORY (INHALATION) at 08:08

## 2023-08-01 RX ADMIN — INSULIN ASPART 6 UNITS: 100 INJECTION, SOLUTION INTRAVENOUS; SUBCUTANEOUS at 09:08

## 2023-08-01 RX ADMIN — TAMSULOSIN HYDROCHLORIDE 0.4 MG: 0.4 CAPSULE ORAL at 09:08

## 2023-08-01 RX ADMIN — INSULIN ASPART 10 UNITS: 100 INJECTION, SOLUTION INTRAVENOUS; SUBCUTANEOUS at 04:08

## 2023-08-01 RX ADMIN — THERA TABS 1 TABLET: TAB at 09:08

## 2023-08-01 RX ADMIN — INSULIN ASPART 5 UNITS: 100 INJECTION, SOLUTION INTRAVENOUS; SUBCUTANEOUS at 08:08

## 2023-08-01 RX ADMIN — DEXAMETHASONE 6 MG: 2 TABLET ORAL at 09:08

## 2023-08-01 RX ADMIN — CAPSAICIN: 0.25 CREAM TOPICAL at 09:08

## 2023-08-01 RX ADMIN — REMDESIVIR 100 MG: 100 INJECTION, POWDER, LYOPHILIZED, FOR SOLUTION INTRAVENOUS at 09:08

## 2023-08-01 RX ADMIN — LIDOCAINE 5% 1 PATCH: 700 PATCH TOPICAL at 05:08

## 2023-08-01 NOTE — NURSING
[facility], [facility] [place]  Nurses Note -- 4 Eyes      7/31/2023       Skin assessed on: Q Shift      [x] No Pressure Injuries Present    []Prevention Measures Documented    [] Yes LDA  for Pressure Injury Previously documented     [] Yes New Pressure Injury Discovered   [] LDA for New Pressure Injury Added      Attending RN:  Ace Dumont LPN     Second RN:  Rasheeda COOK

## 2023-08-01 NOTE — SUBJECTIVE & OBJECTIVE
Interval History: glucose uncontrolled, feeling better today    Review of Systems  Objective:     Vital Signs (Most Recent):  Temp: 97.4 °F (36.3 °C) (08/01/23 1623)  Pulse: (!) 58 (08/01/23 1623)  Resp: 15 (08/01/23 1623)  BP: (!) 144/66 (08/01/23 1623)  SpO2: 100 % (08/01/23 1623) Vital Signs (24h Range):  Temp:  [97.4 °F (36.3 °C)-97.8 °F (36.6 °C)] 97.4 °F (36.3 °C)  Pulse:  [55-58] 58  Resp:  [15-20] 15  SpO2:  [95 %-100 %] 100 %  BP: (144-159)/(66-72) 144/66     Weight: 98.9 kg (218 lb)  Body mass index is 30.4 kg/m².    Intake/Output Summary (Last 24 hours) at 8/1/2023 1647  Last data filed at 8/1/2023 0819  Gross per 24 hour   Intake 730 ml   Output 1000 ml   Net -270 ml           Physical Exam  Vitals and nursing note reviewed.   Constitutional:       General: He is not in acute distress.     Appearance: He is well-developed. He is obese.   Cardiovascular:      Rate and Rhythm: Normal rate.   Pulmonary:      Effort: Pulmonary effort is normal. No respiratory distress.      Breath sounds: Normal breath sounds. No wheezing or rales.   Abdominal:      General: Bowel sounds are normal. There is no distension.      Palpations: Abdomen is soft.      Tenderness: There is no abdominal tenderness.   Musculoskeletal:         General: No tenderness. Normal range of motion.      Cervical back: Normal range of motion and neck supple.      Right lower leg: Edema present.      Left lower leg: Edema present.      Comments: Anasarca noted up to pelvis/abdomen  Feet/lower extremity edema improving but still with pitting edema extending up to abdomen   Skin:     General: Skin is warm and dry.      Findings: Lesion and rash present.      Comments: See media for pictures from previous admission   1. Gluteal cleft/buttocks -denuded and excoriated  2. Right buttock shingles lesions- no vesicles on right buttock and in different healing stage.   3. Right lateral side just below axillary small blisters less then eraser pencil  size  4. Right foot ventral aspect skin tear without drainage at this time.      All stable/healing   Neurological:      Mental Status: He is alert and oriented to person, place, and time.   Psychiatric:         Thought Content: Thought content normal.             Significant Labs: All pertinent labs within the past 24 hours have been reviewed.    Significant Imaging: I have reviewed all pertinent imaging results/findings within the past 24 hours.

## 2023-08-01 NOTE — PLAN OF CARE
Problem: Physical Therapy  Goal: Physical Therapy Goal  Description: Goals to be met by: 23     Patient will increase functional independence with mobility by performin. Pt to be min A with bed mobility.  2. Pt to transfer with min A.  3. Pt to be able to tolerate standing with RW EOB; Pt to ambulate 15' w/RW min A.  4. Pt to be (I) with written HEP.    Outcome: Ongoing, Progressing   Pt was pleasant and willing to participate. Pt required CGA for Bed Mobility and Transfer. Pt ambulated ~20 ft with SBA/CGA using RW. Pt will cont to benefit from skilled therapy at SNF with multidisciplinary approach to reach goals.  Pt is ok for Transfer from Bed to BS Chair with nursing staff assistance, CGA using RW, nurse notified.

## 2023-08-01 NOTE — ASSESSMENT & PLAN NOTE
Patient's FSGs are uncontrolled due to hyperglycemia on current medication regimen.  Last A1c reviewed-   Lab Results   Component Value Date    HGBA1C 7.8 (H) 06/02/2023     Most recent fingerstick glucose reviewed-   Recent Labs   Lab 08/01/23  0915 08/01/23  1131 08/01/23  1621   POCTGLUCOSE 416* 404* 498*     Current correctional scale  Medium  Maintain anti-hyperglycemic dose as follows-   Antihyperglycemics (From admission, onward)    Start     Stop Route Frequency Ordered    08/01/23 1700  insulin detemir U-100 (Levemir) pen 45 Units         -- SubQ Nightly 08/01/23 1646    07/31/23 1130  insulin aspart U-100 pen 6 Units         -- SubQ 3 times daily with meals 07/31/23 0958    07/28/23 0024  insulin aspart U-100 pen 1-10 Units         -- SubQ Before meals & nightly PRN 07/27/23 2326        Hold Oral hypoglycemics while patient is in the hospital.

## 2023-08-01 NOTE — ASSESSMENT & PLAN NOTE
Presents with increase BLE up to hips and abdomen edema inhibiting patient to ambulate. Recent 2 D echo in June HFpEF.   Edema improving. Severe malnutrition, covid 19 infection, and immobility also contributing to overall debilitating state  Continue IV diuresis - still decent amount of lower extremity edema

## 2023-08-01 NOTE — PLAN OF CARE
Case Management Assessment     PCP: Rayna [facility] [place]  Pharmacy: [id number] - [place], [place] - [address]      Patient Arrived From: Home  Existing Help at Home: Jyoti [phone]     Barriers to Discharge: Financials for care home placement    Discharge Plan:    A. SNF   B. New care home placement    Per care port, patient medically accepted to [facility]. Message sent to both via care port to contact pt's nimonica.     Placed call to patient's lillianaeceAleena, left voice message.     Spoke with Adrianna with [facility] , stated will reach out to patient's niece then submit for auth. TN to continue to follow up.     08/01/23 0903   Discharge Assessment   Assessment Type Discharge Planning Assessment   Confirmed/corrected address, phone number and insurance Yes   Confirmed Demographics Correct on Facesheet   Source of Information health record   Does patient/caregiver understand observation status No   Was observation education provided? No   People in Home alone   Do you expect to return to your current living situation? Other (see comments)   Do you have help at home or someone to help you manage your care at home? No   Prior to hospitilization cognitive status: Alert/Oriented   Current cognitive status: Alert/Oriented   Equipment Currently Used at Home rollator;power chair   Readmission within 30 days? Yes   Do you currently have service(s) that help you manage your care at home? Yes   Name and Contact number of agency [facility]   Do you take prescription medications? Yes   Do you have prescription coverage? Yes   Coverage [employer] managed medicare   Do you have any problems affording any of your prescribed medications? TBD   How do you get to doctors appointments? agency   Are you on dialysis? No   Do you take coumadin? No   DME Needed Upon Discharge  none   Transition of Care Barriers Social;Other (see comments)

## 2023-08-01 NOTE — PROGRESS NOTES
[facility]  Progress Note    Patient Name: Chato Bowie  MRN: [id number]  Patient Class: IP- Inpatient   Admission Date: 7/27/2023  Length of Stay: 2 days  Attending Physician: Sea Phelan MD  Primary Care Provider: [facility] [place]        Subjective:     Principal Problem:Chronic diastolic heart failure        HPI:  Chato Bowie 72 y.o. male with CHF, CAD, DM, HTN, HLD, presents to the hospital with a chief complaint of BLE swelling and pain.  onset several weeks ago per chart review, patient was recently discharged from the ED yesterday for similar complaints, and was discharged from inpatient status on 07/25/2023. the patient had endorsed no relief to his complaints since discharge. Patient currently reports complaints of BLE swelling with associated pain. He states his complaints have been ongoing for a few weeks, but had worsened as of recent. He additionally reports he last had similar complaints a month ago, noting he had to be admitted for a couple of nights. He endorses compliance with antihypertensives and diuretics. He reports following with his PCP at [facility], but reports he does not know who is his cardiologist. Denies fever, shortness of breath, or cough.  Patient's niece states that the patient does not live in assisted living however he does need it as she can not provide care for him especially due to him being COVID positive.  No other alleviating or exacerbating factors noted.    In the ED patient was hypertensive (181/140) afebrile without leukocytosis, mild normocytic anemia noted, BUN 46, creatinine 2.7, EGFR 24, glucose 136, albumin 2.3, , TSH WNL, COVID positive, flu negative, UA showed +1 protein, +2 occult blood, CXR was negative for acute process.  Patient was placed in observation      Overview/Hospital Course:  Admission to hospital for BLE edema up hips due to acute on chronic diastolic heart failure and malnutrition. Patient also found  positive COVID 19 -asymptomatic on admission so Remdesivir discontinued. Edema improving with IV diuresis.  Became hypoxic night 7/29 requiring 2 L NC. Started Remdesivir/decadron (7/30), repeat CXR. Hypoglycemia (7/29) improved with oral intake and holding insulin.   He also have left  knee pain and edema similar to previous admission in June where Orthopedic surgery suspected gouty arthritis and attempted aspiration but only able to give steroid injection with improvement. No colchicine due to CKD. Left knee x ray on 7/17/23 showed significant suprapatellar effusion. Due to pain and decrease mobility, will ask Orthopedic Surgery to evaluate.  Hold apixaban  (last dose 7/29 2221).   Hgb treaded down 8.2>7.5>7.1>6.5 without over signs of bleeding. Had normal BM on 7/29 no melena or hematochezia. Previous iron studies low iron/t stat and elevated ferritin, normal vitamin b 12, and low folic acid. Start folic acid, ferrous sulfate and transfuse 1 unit of PRBC.   Recent admission 7/17-7/25 for sepsis due to shingles/cellulitis and ELIZABETH. Right buttock lesion from shingles are all in different part of healing phase without drainage. Also have gluteal cleft excoriation. Continue wound care as recommended by wound care team on previous admission-Cleanse both area with vashe, put mepilex to gluteal cleft, and put hydrogel to shingle lesion.   Also have right upper lateral back below axillary unroof blister and right foot ventral aspect skin tear. Keep clean and dry.     PT/OT evaluation completed. TN/SW to assist with SNF.               Interval History: glucose uncontrolled, feeling better today    Review of Systems  Objective:     Vital Signs (Most Recent):  Temp: 97.4 °F (36.3 °C) (08/01/23 1623)  Pulse: (!) 58 (08/01/23 1623)  Resp: 15 (08/01/23 1623)  BP: (!) 144/66 (08/01/23 1623)  SpO2: 100 % (08/01/23 1623) Vital Signs (24h Range):  Temp:  [97.4 °F (36.3 °C)-97.8 °F (36.6 °C)] 97.4 °F (36.3 °C)  Pulse:  [55-58]  58  Resp:  [15-20] 15  SpO2:  [95 %-100 %] 100 %  BP: (144-159)/(66-72) 144/66     Weight: 98.9 kg (218 lb)  Body mass index is 30.4 kg/m².    Intake/Output Summary (Last 24 hours) at 8/1/2023 1647  Last data filed at 8/1/2023 0819  Gross per 24 hour   Intake 730 ml   Output 1000 ml   Net -270 ml           Physical Exam  Vitals and nursing note reviewed.   Constitutional:       General: He is not in acute distress.     Appearance: He is well-developed. He is obese.   Cardiovascular:      Rate and Rhythm: Normal rate.   Pulmonary:      Effort: Pulmonary effort is normal. No respiratory distress.      Breath sounds: Normal breath sounds. No wheezing or rales.   Abdominal:      General: Bowel sounds are normal. There is no distension.      Palpations: Abdomen is soft.      Tenderness: There is no abdominal tenderness.   Musculoskeletal:         General: No tenderness. Normal range of motion.      Cervical back: Normal range of motion and neck supple.      Right lower leg: Edema present.      Left lower leg: Edema present.      Comments: Anasarca noted up to pelvis/abdomen  Feet/lower extremity edema improving but still with pitting edema extending up to abdomen   Skin:     General: Skin is warm and dry.      Findings: Lesion and rash present.      Comments: See media for pictures from previous admission   1. Gluteal cleft/buttocks -denuded and excoriated  2. Right buttock shingles lesions- no vesicles on right buttock and in different healing stage.   3. Right lateral side just below axillary small blisters less then eraser pencil size  4. Right foot ventral aspect skin tear without drainage at this time.      All stable/healing   Neurological:      Mental Status: He is alert and oriented to person, place, and time.   Psychiatric:         Thought Content: Thought content normal.             Significant Labs: All pertinent labs within the past 24 hours have been reviewed.    Significant Imaging: I have reviewed all  pertinent imaging results/findings within the past 24 hours.      Assessment/Plan:      * acute on chronic diastolic heart failure  Presents with increase BLE up to hips and abdomen edema inhibiting patient to ambulate. Recent 2 D echo in June HFpEF.   Edema improving. Severe malnutrition, covid 19 infection, and immobility also contributing to overall debilitating state  Continue IV diuresis - still decent amount of lower extremity edema      Effusion of left knee  X-ray on 07/17 shows significant suprapatellar effusion.  Previous admission in June orthopedic surgery attempted aspiration but no fluid then injected steroid with improvement in pain  No colchicine secondary to CKD  On Decadron for COVID-19 infection  Add uric acid however may be elevated in CKD  Consult orthopedic surgery for possible aspiration or steroid injection    Multiple wounds  1. Gluteal cleft - excoriation- Wound care consult from previous admission few days ago-   2. Right buttock lesion from shingles healing at different stages - no vesicles  3. Right foot ventral aspect skin tear- clean with vashe and keep clean JASON  4. Right lateral back below armput few less then eraser size blister. - keep clean dry  Cleanse with vashe and put hydrogel to right buttock lesions from shingles   External jackson catheter is now leaking, he is alert/awake enough to use urinal at bedside      Anemia in other chronic diseases classified elsewhere        COVID-19 virus infection  Initially asymptomatic now with non productive cough, 7/29 night 89% improve with O2 2 LNC  COVID 19 protocol  Elevated D dimer, SED, CRP   Normal lactate - add procalcitonin 0.08  Repeat CXR showed increase opacities and small pleural effusions  Tessalon PRN  Albuterol inhaler bid  IS  Redemsivir/decadron  Wean oxygen to keep O2 sat >92% - now on room air  - stop dexamethasone    Debility Due to left knee pain and effusion due to gout? and/or injury  Prior to admission patient used a  roller walker for ambulation at baseline.  Patient unable to ambulate even with walker due to swelling of his legs and left knee pain  PT/OT evaluation for decrease in mobility- TN to assist with SNF  - xrays pending per Orthopedic surgery    Severe malnutrition  Add protein supplementations with meals    DM (diabetes mellitus) type II controlled with renal manifestation  Lab Results   Component Value Date    HGBA1C 7.8 (H) 06/02/2023   Hypoglycemia resolved  Glucose uncontrolled with addition of dexamethasone  - start back on basal + bolus regimen         Anasarca  Due to HFpEF, CKD, malnutrition, COVID 19 infection and immobility- addressing these issues.       CKD (chronic kidney disease), stage IV  Baseline sCr 2.7-3.0. sCr 2.4 currently  Avoid nephrotoxins   Watch closely while diuresing      Paroxysmal atrial flutter  Hold eliquis for now as anticipate need for left knee aspiration - no intervention, resume  Continue amiodarone and metoprolol    BPH (benign prostatic hyperplasia)  Chronic, stable, continue Flomax    Alcohol abuse  Patient has a history of alcohol abuse however denies any recent used in the past week.    No signs of DT this admission         Anemia  Hgb drop this admission and compared to last month hgb 10-11 range. Hgb 8.2>7.5>7.1 >6.5 today  BM last night no melena or hematochezia. Previous admission friable gluteal cleft bleed also noted.  3/2016 EGD normal esophagus and erythematous stomach and duodenum but no mention ulcers/erosions  CKD IV also contributing   Low iron and t stat elevated ferritin, normal vitamin b 12, low folic acid  Start folic acid, ferrous sulfate  No overt GI bleed . Holding eliquis as anticipate may need left knee aspiration  Transfuse 1 unit of PRBC   If continues to drop, continue to hold eliquis and consult GI  Add PPI      Type 2 diabetes mellitus with kidney complication, with long-term current use of insulin  Patient's FSGs are uncontrolled due to hyperglycemia  on current medication regimen.  Last A1c reviewed-   Lab Results   Component Value Date    HGBA1C 7.8 (H) 06/02/2023     Most recent fingerstick glucose reviewed-   Recent Labs   Lab 08/01/23  0915 08/01/23  1131 08/01/23  1621   POCTGLUCOSE 416* 404* 498*     Current correctional scale  Medium  Maintain anti-hyperglycemic dose as follows-   Antihyperglycemics (From admission, onward)    Start     Stop Route Frequency Ordered    08/01/23 1700  insulin detemir U-100 (Levemir) pen 45 Units         -- SubQ Nightly 08/01/23 1646    07/31/23 1130  insulin aspart U-100 pen 6 Units         -- SubQ 3 times daily with meals 07/31/23 0958    07/28/23 0024  insulin aspart U-100 pen 1-10 Units         -- SubQ Before meals & nightly PRN 07/27/23 2326        Hold Oral hypoglycemics while patient is in the hospital.    Dyslipidemia  No results found for: LDH, LDH   Chronic, stable, substitute for atorvastatin while in the hospital      Essential hypertension  BP poorly controlled while in the ED now improve  Continue home antihypertensives-amlodipine, hydralazine, metoprolol with parameters  Prn hydralazine SBP >180      VTE Risk Mitigation (From admission, onward)         Ordered     apixaban tablet 5 mg  2 times daily         08/01/23 1648     Place MASOOD hose  Until discontinued         07/28/23 0719     IP VTE HIGH RISK PATIENT  Once         07/27/23 2326     Place sequential compression device  Until discontinued         07/27/23 2326     Reason for No Pharmacological VTE Prophylaxis  Once        Question:  Reasons:  Answer:  Already adequately anticoagulated on oral Anticoagulants    07/27/23 2326                Discharge Planning   ELY: 8/3/2023     Code Status: Full Code   Is the patient medically ready for discharge?:     Reason for patient still in hospital (select all that apply): Treatment  Discharge Plan A: Skilled Nursing Facility   Discharge Delays: (!) Post-Acute Set-up              Sea Phelan MD  Department of  [facility] Surg

## 2023-08-01 NOTE — ASSESSMENT & PLAN NOTE
Initially asymptomatic now with non productive cough, 7/29 night 89% improve with O2 2 LNC  COVID 19 protocol  Elevated D dimer, SED, CRP   Normal lactate - add procalcitonin 0.08  Repeat CXR showed increase opacities and small pleural effusions  Tessalon PRN  Albuterol inhaler bid  IS  Redemsivir/decadron  Wean oxygen to keep O2 sat >92% - now on room air  - stop dexamethasone

## 2023-08-01 NOTE — PLAN OF CARE
Problem: Occupational Therapy  Goal: Occupational Therapy Goal  Description: Goals to be met by: 8/18/23     Patient will increase functional independence with ADLs by performing:    UE Dressing with [place].  LE Dressing with Modified [place].  Grooming while seated at sink with [place].  Toileting from toilet with Modified [place] for hygiene and clothing management.   Bathing from  edge of bed with Minimal Assistance.  Toilet transfer to toilet with Modified [place].  Upper extremity exercise program x10-15 reps per handout, with supervision. MET.     Patient seen by occupational therapy for initial evaluation, so see notes for more info. Patient is having difficulty taking care of himself at home, so needs SNF care. Occupational therapy to see 5x/week.   Outcome: Ongoing, Progressing

## 2023-08-01 NOTE — PROGRESS NOTES
Radiographs confirm both hip joints WNL  Right knee mild osteoarthritis  Left knee severe osteoarthritis.    Rec medical support and OP appt to address treatment options for OA left knee

## 2023-08-01 NOTE — PT/OT/SLP PROGRESS
Occupational Therapy   Treatment    Name: Chato Bowie  MRN: [id number]  Admitting Diagnosis:  Chronic diastolic heart failure       Recommendations:     Discharge Recommendations: nursing facility, skilled  Discharge Equipment Recommendations:  none  Barriers to discharge:  Decreased caregiver support, Other (Comment) (he cannot take care of himself; he has had several recent hospital readmissions)    Assessment:     Chato Bowie is a 72 y.o. male with a medical diagnosis of Chronic diastolic heart failure.  He presents with up in chair next to bed with no oxygen donned and no SOB noted. He had Lidocaine patch on left knee with no pain noted. Performance deficits affecting function are weakness, impaired endurance, impaired sensation, impaired self care skills, impaired functional mobility, gait instability, impaired balance, decreased lower extremity function, decreased safety awareness, pain, decreased ROM, impaired cardiopulmonary response to activity.     Rehab Prognosis:  Good; patient would benefit from acute skilled OT services to address these deficits and reach maximum level of function.       Plan:     Patient to be seen 5 x/week to address the above listed problems via self-care/home management, therapeutic activities, therapeutic exercises  Plan of Care Expires: 08/18/23  Plan of Care Reviewed with: patient    Subjective     Chief Complaint: none   Patient/Family Comments/goals: snf facility   Pain/Comfort:  Pain Rating 1: 0/10    Objective:     Communicated with: JOYCE Tapia,  prior to session.  Patient found up in chair with peripheral IV, telemetry upon OT entry to room.    General Precautions: Standard, airborne, contact, droplet, fall, respiratory    Orthopedic Precautions:N/A  Braces: N/A  Respiratory Status: Room air; patient at 100% and HR 64 on room air and at 99% and 70 HR after exercises      Occupational Performance: Patient said he spoke to niece and has some other family who live in area      Bed Mobility:    Not done today due to sitting in chair       Functional Mobility/Transfers:  Not done today due to sitting in chair; patient said he had just been to restroom     Activities of Daily Living:  Grooming: stand by assistance seated in chair       [facility] 6 Click ADL: 17    Treatment & Education:  Patient demonstrated UB therex with red (medium) theraband with 15 reps done with shoulder overhead presses and chest presses x 2 sets each with no SOB or desaturation noted on room air.     Patient left up in chair with all lines intact, call button in reach, and RN notified    GOALS:   Multidisciplinary Problems       Occupational Therapy Goals          Problem: Occupational Therapy    Goal Priority Disciplines Outcome Interventions   Occupational Therapy Goal     OT, PT/OT Ongoing, Progressing    Description: Goals to be met by: 8/18/23     Patient will increase functional independence with ADLs by performing:    UE Dressing with [place].  LE Dressing with Modified [place].  Grooming while seated at sink with [place].  Toileting from toilet with Modified [place] for hygiene and clothing management.   Bathing from  edge of bed with Minimal Assistance.  Toilet transfer to toilet with Modified [place].  Upper extremity exercise program x10-15 reps per handout, with supervision.      Patient seen by occupational therapy for initial evaluation, so see notes for more info. Patient is having difficulty taking care of himself at home, so needs SNF care. Occupational therapy to see 5x/week.                        Time Tracking:     OT Date of Treatment: 08/01/23  OT Start Time: 1515  OT Stop Time: 1530  OT Total Time (min): 15 min    Billable Minutes:Therapeutic Exercise 15    OT/JASON: OT          8/1/2023

## 2023-08-01 NOTE — PT/OT/SLP PROGRESS
Physical Therapy Treatment    Patient Name:  Chato Bowie   MRN:  [id number]    Recommendations:     Discharge Recommendations: nursing facility, skilled  Discharge Equipment Recommendations: none  Barriers to discharge:  COVID+, pt is not functioning at Independent baseline and is at increased risk for falls and readmission if he returns to prior living arrangements at present     Assessment:     Chato Bowie is a 72 y.o. male admitted with a medical diagnosis of Chronic diastolic heart failure.  He presents with the following impairments/functional limitations: weakness, impaired endurance, impaired functional mobility, gait instability, impaired balance, impaired self care skills, decreased coordination, decreased upper extremity function, decreased lower extremity function, decreased safety awareness, pain, decreased ROM, impaired skin, edema, impaired cardiopulmonary response to activity, orthopedic precautions.    Pt was pleasant and willing to participate. Pt required CGA for Bed Mobility and Transfer. Pt ambulated ~20 ft with SBA/CGA using RW. Pt will cont to benefit from skilled therapy at SNF with multidisciplinary approach to reach goals.    Rehab Prognosis: Good; patient would benefit from acute skilled PT services to address these deficits and reach maximum level of function.    Recent Surgery: * No surgery found *      Plan:     During this hospitalization, patient to be seen 6 x/week to address the identified rehab impairments via gait training, therapeutic activities, therapeutic exercises, neuromuscular re-education, wheelchair management/training and progress toward the following goals:    Plan of Care Expires:  08/04/23    Subjective     Chief Complaint: soreness pain to R posterior/laterl hip  Patient/Family Comments/goals: Pt agreed to sit UIC post treatment.  Pain/Comfort:  Pain Rating 1:  (soreness pain to R posterior/laterl hip, pt did not rate)  Pain Addressed 1: Reposition, Distraction,  Cessation of Activity  Pain Rating Post-Intervention 1:  (pt did not rate)      Objective:     Communicated with nurse Tapia prior to session.  Patient found HOB elevated with peripheral IV, oxygen, telemetry upon PT entry to room.     General Precautions: Standard, fall, respiratory, airborne, contact, droplet (COVID 19+)  Orthopedic Precautions: N/A  Braces: N/A  Respiratory Status: Nasal cannula, flow 1 L/min     Functional Mobility:  Bed Mobility:     Scooting: anterior scoot to EOB with stand by assistance  Supine to Sit: stand by assistance with HOB elevated  Transfers:   gait belt donned prior OOB activity  Sit to Stand: from EOB with contact guard assistance with rolling walker, v/c for proper hand placement  Gait: Pt ambulated ~20 ft with SBA/CGA using RW. Pt with flexed posture, decreased fanta/step length; v/c for upright posture, , AD management, keeping RW closed, increase step length  Balance: Fair+ Sitting; Fair Standing      AM-PAC 6 CLICK MOBILITY  Turning over in bed (including adjusting bedclothes, sheets and blankets)?: 3  Sitting down on and standing up from a chair with arms (e.g., wheelchair, bedside commode, etc.): 3  Moving from lying on back to sitting on the side of the bed?: 3  Moving to and from a bed to a chair (including a wheelchair)?: 3  Need to walk in hospital room?: 3  Climbing 3-5 steps with a railing?: 2  Basic Mobility Total Score: 17       Treatment & Education:  Educated pt on benefits of OOB activity with hospital staff assistance and performing BLE ex throughout the day, pt verbalized understanding.    BLE ex in seated 2 sets x 15 reps AP, LAQ, Marching  (having pt counting number out loud) SpO2 92-95% on RA    Pt is ok for Transfer from Bed to BS Chair with nursing staff assistance, CGA using RW, nurse notified.    Patient left up in chair on pillow with tray table + lunch tray in front, all lines intact, call button in reach, and nurse notified.    GOALS:    Multidisciplinary Problems       Physical Therapy Goals          Problem: Physical Therapy    Goal Priority Disciplines Outcome Goal Variances Interventions   Physical Therapy Goal     PT, PT/OT Ongoing, Progressing     Description: Goals to be met by: 23     Patient will increase functional independence with mobility by performin. Pt to be min A with bed mobility.  2. Pt to transfer with min A.  3. Pt to be able to tolerate standing with RW EOB; Pt to ambulate 15' w/RW min A.  4. Pt to be (I) with written HEP.                         Time Tracking:     PT Received On: 23  PT Start Time: 1157     PT Stop Time: 1227  PT Total Time (min): 30 min     Billable Minutes: Gait Training 13 min and Therapeutic Exercise 17 min    Treatment Type: Treatment  PT/PTA: PTA     Number of PTA visits since last PT visit: 2023

## 2023-08-01 NOTE — ASSESSMENT & PLAN NOTE
Hold eliquis for now as anticipate need for left knee aspiration - no intervention, resume  Continue amiodarone and metoprolol

## 2023-08-02 LAB
ANION GAP SERPL CALC-SCNC: 9 MMOL/L (ref 8–16)
BUN SERPL-MCNC: 73 MG/DL (ref 8–23)
CALCIUM SERPL-MCNC: 8.7 MG/DL (ref 8.7–10.5)
CHLORIDE SERPL-SCNC: 100 MMOL/L (ref 95–110)
CO2 SERPL-SCNC: 25 MMOL/L (ref 23–29)
CREAT SERPL-MCNC: 2.5 MG/DL (ref 0.5–1.4)
EST. GFR  (NO RACE VARIABLE): 27 ML/MIN/1.73 M^2
GLUCOSE SERPL-MCNC: 311 MG/DL (ref 70–110)
POCT GLUCOSE: 308 MG/DL (ref 70–110)
POCT GLUCOSE: 325 MG/DL (ref 70–110)
POCT GLUCOSE: 357 MG/DL (ref 70–110)
POCT GLUCOSE: 359 MG/DL (ref 70–110)
POCT GLUCOSE: 363 MG/DL (ref 70–110)
POCT GLUCOSE: 364 MG/DL (ref 70–110)
POTASSIUM SERPL-SCNC: 3.8 MMOL/L (ref 3.5–5.1)
SODIUM SERPL-SCNC: 134 MMOL/L (ref 136–145)

## 2023-08-02 PROCEDURE — 25000242 PHARM REV CODE 250 ALT 637 W/ HCPCS: Performed by: NURSE PRACTITIONER

## 2023-08-02 PROCEDURE — 27000207 HC ISOLATION

## 2023-08-02 PROCEDURE — 63600175 PHARM REV CODE 636 W HCPCS

## 2023-08-02 PROCEDURE — 97535 SELF CARE MNGMENT TRAINING: CPT

## 2023-08-02 PROCEDURE — 25000003 PHARM REV CODE 250: Performed by: NURSE PRACTITIONER

## 2023-08-02 PROCEDURE — 11000001 HC ACUTE MED/SURG PRIVATE ROOM

## 2023-08-02 PROCEDURE — 94761 N-INVAS EAR/PLS OXIMETRY MLT: CPT

## 2023-08-02 PROCEDURE — 94640 AIRWAY INHALATION TREATMENT: CPT

## 2023-08-02 PROCEDURE — 80048 BASIC METABOLIC PNL TOTAL CA: CPT | Performed by: STUDENT IN AN ORGANIZED HEALTH CARE EDUCATION/TRAINING PROGRAM

## 2023-08-02 PROCEDURE — 99900035 HC TECH TIME PER 15 MIN (STAT)

## 2023-08-02 PROCEDURE — 25000003 PHARM REV CODE 250: Performed by: STUDENT IN AN ORGANIZED HEALTH CARE EDUCATION/TRAINING PROGRAM

## 2023-08-02 PROCEDURE — 36415 COLL VENOUS BLD VENIPUNCTURE: CPT | Performed by: STUDENT IN AN ORGANIZED HEALTH CARE EDUCATION/TRAINING PROGRAM

## 2023-08-02 PROCEDURE — 25000003 PHARM REV CODE 250

## 2023-08-02 PROCEDURE — 27000221 HC OXYGEN, UP TO 24 HOURS

## 2023-08-02 PROCEDURE — 94799 UNLISTED PULMONARY SVC/PX: CPT

## 2023-08-02 PROCEDURE — 97110 THERAPEUTIC EXERCISES: CPT

## 2023-08-02 PROCEDURE — 63600175 PHARM REV CODE 636 W HCPCS: Mod: JZ,TB | Performed by: NURSE PRACTITIONER

## 2023-08-02 RX ORDER — MUPIROCIN 20 MG/G
OINTMENT TOPICAL 2 TIMES DAILY
Status: DISPENSED | OUTPATIENT
Start: 2023-08-02 | End: 2023-08-07

## 2023-08-02 RX ORDER — INSULIN ASPART 100 [IU]/ML
12 INJECTION, SOLUTION INTRAVENOUS; SUBCUTANEOUS
Status: DISCONTINUED | OUTPATIENT
Start: 2023-08-03 | End: 2023-08-04

## 2023-08-02 RX ADMIN — FUROSEMIDE 40 MG: 10 INJECTION, SOLUTION INTRAVENOUS at 08:08

## 2023-08-02 RX ADMIN — FOLIC ACID 1 MG: 1 TABLET ORAL at 12:08

## 2023-08-02 RX ADMIN — OXYCODONE HYDROCHLORIDE AND ACETAMINOPHEN 500 MG: 500 TABLET ORAL at 12:08

## 2023-08-02 RX ADMIN — ALBUTEROL SULFATE 2.5 MG: 2.5 SOLUTION RESPIRATORY (INHALATION) at 08:08

## 2023-08-02 RX ADMIN — ATORVASTATIN CALCIUM 80 MG: 40 TABLET, FILM COATED ORAL at 12:08

## 2023-08-02 RX ADMIN — CAPSAICIN: 0.25 CREAM TOPICAL at 12:08

## 2023-08-02 RX ADMIN — GABAPENTIN 100 MG: 100 CAPSULE ORAL at 12:08

## 2023-08-02 RX ADMIN — INSULIN ASPART 8 UNITS: 100 INJECTION, SOLUTION INTRAVENOUS; SUBCUTANEOUS at 04:08

## 2023-08-02 RX ADMIN — INSULIN ASPART 6 UNITS: 100 INJECTION, SOLUTION INTRAVENOUS; SUBCUTANEOUS at 12:08

## 2023-08-02 RX ADMIN — INSULIN ASPART 6 UNITS: 100 INJECTION, SOLUTION INTRAVENOUS; SUBCUTANEOUS at 07:08

## 2023-08-02 RX ADMIN — LIDOCAINE 5% 1 PATCH: 700 PATCH TOPICAL at 05:08

## 2023-08-02 RX ADMIN — MUPIROCIN: 20 OINTMENT TOPICAL at 09:08

## 2023-08-02 RX ADMIN — FUROSEMIDE 40 MG: 10 INJECTION, SOLUTION INTRAVENOUS at 12:08

## 2023-08-02 RX ADMIN — REMDESIVIR 100 MG: 100 INJECTION, POWDER, LYOPHILIZED, FOR SOLUTION INTRAVENOUS at 12:08

## 2023-08-02 RX ADMIN — METOPROLOL SUCCINATE 25 MG: 25 TABLET, EXTENDED RELEASE ORAL at 12:08

## 2023-08-02 RX ADMIN — AMLODIPINE BESYLATE 10 MG: 5 TABLET ORAL at 12:08

## 2023-08-02 RX ADMIN — CAPSAICIN: 0.25 CREAM TOPICAL at 09:08

## 2023-08-02 RX ADMIN — APIXABAN 5 MG: 5 TABLET, FILM COATED ORAL at 09:08

## 2023-08-02 RX ADMIN — INSULIN ASPART 10 UNITS: 100 INJECTION, SOLUTION INTRAVENOUS; SUBCUTANEOUS at 12:08

## 2023-08-02 RX ADMIN — OXYCODONE HYDROCHLORIDE AND ACETAMINOPHEN 500 MG: 500 TABLET ORAL at 09:08

## 2023-08-02 RX ADMIN — INSULIN ASPART 6 UNITS: 100 INJECTION, SOLUTION INTRAVENOUS; SUBCUTANEOUS at 04:08

## 2023-08-02 RX ADMIN — HYDROCODONE BITARTRATE AND ACETAMINOPHEN 1 TABLET: 5; 325 TABLET ORAL at 04:08

## 2023-08-02 RX ADMIN — INSULIN ASPART 10 UNITS: 100 INJECTION, SOLUTION INTRAVENOUS; SUBCUTANEOUS at 04:08

## 2023-08-02 RX ADMIN — THERA TABS 1 TABLET: TAB at 12:08

## 2023-08-02 RX ADMIN — GABAPENTIN 100 MG: 100 CAPSULE ORAL at 09:08

## 2023-08-02 RX ADMIN — AMIODARONE HYDROCHLORIDE 200 MG: 200 TABLET ORAL at 12:08

## 2023-08-02 RX ADMIN — INSULIN ASPART 5 UNITS: 100 INJECTION, SOLUTION INTRAVENOUS; SUBCUTANEOUS at 09:08

## 2023-08-02 RX ADMIN — TAMSULOSIN HYDROCHLORIDE 0.4 MG: 0.4 CAPSULE ORAL at 12:08

## 2023-08-02 RX ADMIN — APIXABAN 5 MG: 5 TABLET, FILM COATED ORAL at 12:08

## 2023-08-02 NOTE — SUBJECTIVE & OBJECTIVE
Interval History: having some pain where lesions are, lower extremity swelling significantly improved, plan to transition to PO tomorrow    Review of Systems  Objective:     Vital Signs (Most Recent):  Temp: 98 °F (36.7 °C) (08/02/23 1623)  Pulse: 63 (08/02/23 1623)  Resp: 18 (08/02/23 1626)  BP: (!) 159/70 (08/02/23 1623)  SpO2: 98 % (08/02/23 1623) Vital Signs (24h Range):  Temp:  [97.5 °F (36.4 °C)-98 °F (36.7 °C)] 98 °F (36.7 °C)  Pulse:  [61-66] 63  Resp:  [14-19] 18  SpO2:  [95 %-100 %] 98 %  BP: (155-179)/(70-86) 159/70     Weight: 98.9 kg (218 lb)  Body mass index is 30.4 kg/m².    Intake/Output Summary (Last 24 hours) at 8/2/2023 1656  Last data filed at 8/2/2023 1217  Gross per 24 hour   Intake 1042.78 ml   Output 1400 ml   Net -357.22 ml           Physical Exam  Vitals and nursing note reviewed.   Constitutional:       General: He is not in acute distress.     Appearance: He is well-developed. He is obese.   Cardiovascular:      Rate and Rhythm: Normal rate.   Pulmonary:      Effort: Pulmonary effort is normal. No respiratory distress.      Breath sounds: Normal breath sounds. No wheezing or rales.   Abdominal:      General: Bowel sounds are normal. There is no distension.      Palpations: Abdomen is soft.      Tenderness: There is no abdominal tenderness.   Musculoskeletal:         General: No tenderness. Normal range of motion.      Cervical back: Normal range of motion and neck supple.      Right lower leg: Edema present.      Left lower leg: Edema present.      Comments: Anasarca noted up to pelvis/abdomen  Feet/lower extremity edema improving but still with pitting edema extending up to abdomen   Skin:     General: Skin is warm and dry.      Findings: Lesion and rash present.      Comments: See media for pictures from previous admission   1. Gluteal cleft/buttocks -denuded and excoriated  2. Right buttock shingles lesions- no vesicles on right buttock and in different healing stage.   3. Right  lateral side just below axillary small blisters less then eraser pencil size  4. Right foot ventral aspect skin tear without drainage at this time.      All stable/healing   Neurological:      Mental Status: He is alert and oriented to person, place, and time.   Psychiatric:         Thought Content: Thought content normal.             Significant Labs: All pertinent labs within the past 24 hours have been reviewed.    Significant Imaging: I have reviewed all pertinent imaging results/findings within the past 24 hours.

## 2023-08-02 NOTE — ASSESSMENT & PLAN NOTE
Presents with increase BLE up to hips and abdomen edema inhibiting patient to ambulate. Recent 2 D echo in June HFpEF.   Edema improving. Severe malnutrition, covid 19 infection, and immobility also contributing to overall debilitating state  Continue IV diuresis - still decent amount of lower extremity edema  -likely can transition to PO tomorrow

## 2023-08-02 NOTE — NURSING
[facility] [place]  Nurses Note -- 4 Eyes      8/1/2023       Skin assessed on: Q Shift      [] No Pressure Injuries Present    []Prevention Measures Documented    [x] Yes LDA  for Pressure Injury Previously documented     [] Yes New Pressure Injury Discovered   [] LDA for New Pressure Injury Added      Attending RN:  Ethel Hernandez LPN     Second RN:  Regina Staples RN

## 2023-08-02 NOTE — NURSING
Patient BS was 359. Provider notified and ordered to give Pre-meal dose sliding scale insulin. Will recheck BS in 1 hour. Plan of care continued.

## 2023-08-02 NOTE — ASSESSMENT & PLAN NOTE
Patient's FSGs are uncontrolled due to hyperglycemia on current medication regimen.  Last A1c reviewed-   Lab Results   Component Value Date    HGBA1C 7.8 (H) 06/02/2023     Most recent fingerstick glucose reviewed-   Recent Labs   Lab 08/02/23  0534 08/02/23  0805 08/02/23  1146 08/02/23  1624   POCTGLUCOSE 357* 325* 364* 308*     Current correctional scale  Medium  Maintain anti-hyperglycemic dose as follows-   Antihyperglycemics (From admission, onward)    Start     Stop Route Frequency Ordered    08/03/23 0715  insulin aspart U-100 pen 12 Units         -- SubQ 3 times daily with meals 08/02/23 1655    08/02/23 2100  insulin detemir U-100 (Levemir) pen 50 Units         -- SubQ Nightly 08/02/23 1655    07/28/23 0024  insulin aspart U-100 pen 1-10 Units         -- SubQ Before meals & nightly PRN 07/27/23 2326        Hold Oral hypoglycemics while patient is in the hospital.

## 2023-08-02 NOTE — PT/OT/SLP PROGRESS
Occupational Therapy   Treatment    Name: Chato Bowie  MRN: [id number]  Admitting Diagnosis:  Chronic diastolic heart failure       Recommendations:     Discharge Recommendations: nursing facility, skilled  Discharge Equipment Recommendations:  none  Barriers to discharge:  Decreased caregiver support    Assessment:     Chato Bowie is a 72 y.o. male with a medical diagnosis of Chronic diastolic heart failure.  He presents with HOB elevated. Performance deficits affecting function are impaired endurance, weakness, impaired sensation, impaired self care skills, impaired functional mobility, impaired balance, gait instability, decreased coordination, decreased upper extremity function, decreased lower extremity function, decreased safety awareness, pain, decreased ROM, impaired coordination.     Rehab Prognosis:  Good; patient would benefit from acute skilled OT services to address these deficits and reach maximum level of function.       Plan:     Patient to be seen 5 x/week to address the above listed problems via self-care/home management, therapeutic activities, therapeutic exercises  Plan of Care Expires: 08/18/23  Plan of Care Reviewed with: patient    Subjective     Chief Complaint: right side pain   Patient/Family Comments/goals: Patient agrees to go to SNF   Pain/Comfort:  Pain Rating 1: 3/10  Location - Side 1: Right  Location 1: back    Objective:     Communicated with: RNRegina,  prior to session.  Patient found HOB elevated with peripheral IV, telemetry upon OT entry to room.    General Precautions: Standard, airborne, contact, droplet, fall    Orthopedic Precautions:N/A  Braces: N/A  Respiratory Status: Room air     Occupational Performance:     Bed Mobility:    Pt. Did not do due to fatigue     Functional Mobility/Transfers:  Patient did not do due to fatigue     Activities of Daily Living:  Grooming: setup in bed       [facility] 6 Click ADL: 17    Treatment & Education:  Patient said he was too tired for  exercises, so he agreed to brush teeth bed     Patient left HOB elevated with all lines intact, call button in reach, and RN  notified    GOALS:   Multidisciplinary Problems       Occupational Therapy Goals          Problem: Occupational Therapy    Goal Priority Disciplines Outcome Interventions   Occupational Therapy Goal     OT, PT/OT Ongoing, Progressing    Description: Goals to be met by: 8/18/23     Patient will increase functional independence with ADLs by performing:    UE Dressing with [place].  LE Dressing with Modified [place].  Grooming while seated at sink with [place].  Toileting from toilet with Modified [place] for hygiene and clothing management.   Bathing from  edge of bed with Minimal Assistance.  Toilet transfer to toilet with Modified [place].  Upper extremity exercise program x10-15 reps per handout, with supervision.      Patient seen by occupational therapy for initial evaluation, so see notes for more info. Patient is having difficulty taking care of himself at home, so needs SNF care. Occupational therapy to see 5x/week.                        Time Tracking:     OT Date of Treatment: 08/02/23  OT Start Time: 1445  OT Stop Time: 1500  OT Total Time (min): 15 min    Billable Minutes:Self Care/Home Management 15    OT/JASON: OT          8/2/2023

## 2023-08-02 NOTE — PLAN OF CARE
Patient AAOx4. Isolation precautions maintained throughout shift. Patient used urinal to void. Wound care and bath completed. Plan of care discussed with patient. Questions and concerns addressed. Fall/safety precautions implemented. No acute events noted this shift. Please see flowsheets for full assessment and vital signs. Bed locked in lowest position. Side rails up x2. Call bell with in reach. Will continue to monitor.      Problem: Impaired Wound Healing  Goal: Optimal Wound Healing  Outcome: Ongoing, Progressing  Intervention: Promote Wound Healing  Flowsheets (Taken 8/2/2023 0519)  Oral Nutrition Promotion:   medicated   rest periods promoted  Pain Management Interventions: care clustered     Problem: Skin Injury Risk Increased  Goal: Skin Health and Integrity  Outcome: Ongoing, Progressing  Intervention: Optimize Skin Protection  Flowsheets (Taken 8/2/2023 0519)  Pressure Reduction Techniques: frequent weight shift encouraged  Skin Protection: incontinence pads utilized  Head of Bed (HOB) Positioning: HOB elevated

## 2023-08-02 NOTE — PROGRESS NOTES
[facility] Medicine  Progress Note    Patient Name: Chato Bowie  MRN: [id number]  Patient Class: IP- Inpatient   Admission Date: 7/27/2023  Length of Stay: 3 days  Attending Physician: Sea Phelan MD  Primary Care Provider: Irlanda Valenzuela -        Subjective:     Principal Problem:Chronic diastolic heart failure        HPI:  Chato Bowie 72 y.o. male with CHF, CAD, DM, HTN, HLD, presents to the hospital with a chief complaint of BLE swelling and pain.  onset several weeks ago per chart review, patient was recently discharged from the ED yesterday for similar complaints, and was discharged from inpatient status on 07/25/2023. the patient had endorsed no relief to his complaints since discharge. Patient currently reports complaints of BLE swelling with associated pain. He states his complaints have been ongoing for a few weeks, but had worsened as of recent. He additionally reports he last had similar complaints a month ago, noting he had to be admitted for a couple of nights. He endorses compliance with antihypertensives and diuretics. He reports following with his PCP at [facility], but reports he does not know who is his cardiologist. Denies fever, shortness of breath, or cough.  Patient's niece states that the patient does not live in assisted living however he does need it as she can not provide care for him especially due to him being COVID positive.  No other alleviating or exacerbating factors noted.    In the ED patient was hypertensive (181/140) afebrile without leukocytosis, mild normocytic anemia noted, BUN 46, creatinine 2.7, EGFR 24, glucose 136, albumin 2.3, , TSH WNL, COVID positive, flu negative, UA showed +1 protein, +2 occult blood, CXR was negative for acute process.  Patient was placed in observation      Overview/Hospital Course:  Admission to hospital for BLE edema up hips due to acute on chronic diastolic heart failure and malnutrition. Patient also found  positive COVID 19 -asymptomatic on admission so Remdesivir discontinued. Edema improving with IV diuresis.  Became hypoxic night 7/29 requiring 2 L NC. Started Remdesivir/decadron (7/30), repeat CXR. Hypoglycemia (7/29) improved with oral intake and holding insulin.   He also have left  knee pain and edema similar to previous admission in June where Orthopedic surgery suspected gouty arthritis and attempted aspiration but only able to give steroid injection with improvement. No colchicine due to CKD. Left knee x ray on 7/17/23 showed significant suprapatellar effusion. Due to pain and decrease mobility, will ask Orthopedic Surgery to evaluate.  Hold apixaban  (last dose 7/29 2221).   Hgb treaded down 8.2>7.5>7.1>6.5 without over signs of bleeding. Had normal BM on 7/29 no melena or hematochezia. Previous iron studies low iron/t stat and elevated ferritin, normal vitamin b 12, and low folic acid. Start folic acid, ferrous sulfate and transfuse 1 unit of PRBC.   Recent admission 7/17-7/25 for sepsis due to shingles/cellulitis and ELIZABETH. Right buttock lesion from shingles are all in different part of healing phase without drainage. Also have gluteal cleft excoriation. Continue wound care as recommended by wound care team on previous admission-Cleanse both area with vashe, put mepilex to gluteal cleft, and put hydrogel to shingle lesion.   Also have right upper lateral back below axillary unroof blister and right foot ventral aspect skin tear. Keep clean and dry.     PT/OT evaluation completed. TN/SW to assist with SNF.               Interval History: having some pain where lesions are, lower extremity swelling significantly improved, plan to transition to PO tomorrow    Review of Systems  Objective:     Vital Signs (Most Recent):  Temp: 98 °F (36.7 °C) (08/02/23 1623)  Pulse: 63 (08/02/23 1623)  Resp: 18 (08/02/23 1626)  BP: (!) 159/70 (08/02/23 1623)  SpO2: 98 % (08/02/23 1623) Vital Signs (24h Range):  Temp:  [97.5 °F  (36.4 °C)-98 °F (36.7 °C)] 98 °F (36.7 °C)  Pulse:  [61-66] 63  Resp:  [14-19] 18  SpO2:  [95 %-100 %] 98 %  BP: (155-179)/(70-86) 159/70     Weight: 98.9 kg (218 lb)  Body mass index is 30.4 kg/m².    Intake/Output Summary (Last 24 hours) at 8/2/2023 1656  Last data filed at 8/2/2023 1217  Gross per 24 hour   Intake 1042.78 ml   Output 1400 ml   Net -357.22 ml           Physical Exam  Vitals and nursing note reviewed.   Constitutional:       General: He is not in acute distress.     Appearance: He is well-developed. He is obese.   Cardiovascular:      Rate and Rhythm: Normal rate.   Pulmonary:      Effort: Pulmonary effort is normal. No respiratory distress.      Breath sounds: Normal breath sounds. No wheezing or rales.   Abdominal:      General: Bowel sounds are normal. There is no distension.      Palpations: Abdomen is soft.      Tenderness: There is no abdominal tenderness.   Musculoskeletal:         General: No tenderness. Normal range of motion.      Cervical back: Normal range of motion and neck supple.      Right lower leg: Edema present.      Left lower leg: Edema present.      Comments: Anasarca noted up to pelvis/abdomen  Feet/lower extremity edema improving but still with pitting edema extending up to abdomen   Skin:     General: Skin is warm and dry.      Findings: Lesion and rash present.      Comments: See media for pictures from previous admission   1. Gluteal cleft/buttocks -denuded and excoriated  2. Right buttock shingles lesions- no vesicles on right buttock and in different healing stage.   3. Right lateral side just below axillary small blisters less then eraser pencil size  4. Right foot ventral aspect skin tear without drainage at this time.      All stable/healing   Neurological:      Mental Status: He is alert and oriented to person, place, and time.   Psychiatric:         Thought Content: Thought content normal.             Significant Labs: All pertinent labs within the past 24 hours  have been reviewed.    Significant Imaging: I have reviewed all pertinent imaging results/findings within the past 24 hours.      Assessment/Plan:      * acute on chronic diastolic heart failure  Presents with increase BLE up to hips and abdomen edema inhibiting patient to ambulate. Recent 2 D echo in June HFpEF.   Edema improving. Severe malnutrition, covid 19 infection, and immobility also contributing to overall debilitating state  Continue IV diuresis - still decent amount of lower extremity edema  -likely can transition to PO tomorrow      Effusion of left knee  X-ray on 07/17 shows significant suprapatellar effusion.  Previous admission in June orthopedic surgery attempted aspiration but no fluid then injected steroid with improvement in pain  No colchicine secondary to CKD  On Decadron for COVID-19 infection  Add uric acid however may be elevated in CKD  Consult orthopedic surgery for possible aspiration or steroid injection    Multiple wounds  1. Gluteal cleft - excoriation- Wound care consult from previous admission few days ago-   2. Right buttock lesion from shingles healing at different stages - no vesicles  3. Right foot ventral aspect skin tear- clean with vashe and keep clean JASON  4. Right lateral back below armput few less then eraser size blister. - keep clean dry  Cleanse with vashe and put hydrogel to right buttock lesions from shingles   External jackson catheter is now leaking, he is alert/awake enough to use urinal at bedside      Anemia in other chronic diseases classified elsewhere        COVID-19 virus infection  Initially asymptomatic now with non productive cough, 7/29 night 89% improve with O2 2 LNC  COVID 19 protocol  Elevated D dimer, SED, CRP   Normal lactate - add procalcitonin 0.08  Repeat CXR showed increase opacities and small pleural effusions  Tessalon PRN  Albuterol inhaler bid  IS  Redemsivir/decadron  Wean oxygen to keep O2 sat >92% - now on room air  - stop  dexamethasone    Debility Due to left knee pain and effusion due to gout? and/or injury  Prior to admission patient used a roller walker for ambulation at baseline.  Patient unable to ambulate even with walker due to swelling of his legs and left knee pain  PT/OT evaluation for decrease in mobility- TN to assist with SNF  - xrays pending per Orthopedic surgery    Severe malnutrition  Add protein supplementations with meals    DM (diabetes mellitus) type II controlled with renal manifestation  Lab Results   Component Value Date    HGBA1C 7.8 (H) 06/02/2023   Hypoglycemia resolved  Glucose uncontrolled with addition of dexamethasone  - start back on basal + bolus regimen         Anasarca  Due to HFpEF, CKD, malnutrition, COVID 19 infection and immobility- addressing these issues.       CKD (chronic kidney disease), stage IV  Baseline sCr 2.7-3.0. sCr 2.4 currently  Avoid nephrotoxins   Watch closely while diuresing      Paroxysmal atrial flutter  Hold eliquis for now as anticipate need for left knee aspiration - no intervention, resume  Continue amiodarone and metoprolol    BPH (benign prostatic hyperplasia)  Chronic, stable, continue Flomax    Alcohol abuse  Patient has a history of alcohol abuse however denies any recent used in the past week.    No signs of DT this admission         Anemia  Hgb drop this admission and compared to last month hgb 10-11 range. Hgb 8.2>7.5>7.1 >6.5 today  BM last night no melena or hematochezia. Previous admission friable gluteal cleft bleed also noted.  3/2016 EGD normal esophagus and erythematous stomach and duodenum but no mention ulcers/erosions  CKD IV also contributing   Low iron and t stat elevated ferritin, normal vitamin b 12, low folic acid  Start folic acid, ferrous sulfate  No overt GI bleed . Holding eliquis as anticipate may need left knee aspiration  Transfuse 1 unit of PRBC   If continues to drop, continue to hold eliquis and consult GI  Add PPI      Type 2 diabetes  mellitus with kidney complication, with long-term current use of insulin  Patient's FSGs are uncontrolled due to hyperglycemia on current medication regimen.  Last A1c reviewed-   Lab Results   Component Value Date    HGBA1C 7.8 (H) 06/02/2023     Most recent fingerstick glucose reviewed-   Recent Labs   Lab 08/02/23  0534 08/02/23  0805 08/02/23  1146 08/02/23  1624   POCTGLUCOSE 357* 325* 364* 308*     Current correctional scale  Medium  Maintain anti-hyperglycemic dose as follows-   Antihyperglycemics (From admission, onward)    Start     Stop Route Frequency Ordered    08/03/23 0715  insulin aspart U-100 pen 12 Units         -- SubQ 3 times daily with meals 08/02/23 1655    08/02/23 2100  insulin detemir U-100 (Levemir) pen 50 Units         -- SubQ Nightly 08/02/23 1655    07/28/23 0024  insulin aspart U-100 pen 1-10 Units         -- SubQ Before meals & nightly PRN 07/27/23 2326        Hold Oral hypoglycemics while patient is in the hospital.    Dyslipidemia  No results found for: LDH, LDH   Chronic, stable, substitute for atorvastatin while in the hospital      Essential hypertension  BP poorly controlled while in the ED now improve  Continue home antihypertensives-amlodipine, hydralazine, metoprolol with parameters  Prn hydralazine SBP >180      VTE Risk Mitigation (From admission, onward)         Ordered     apixaban tablet 5 mg  2 times daily         08/01/23 1648     Place MASOOD hose  Until discontinued         07/28/23 0719     IP VTE HIGH RISK PATIENT  Once         07/27/23 2326     Place sequential compression device  Until discontinued         07/27/23 2326     Reason for No Pharmacological VTE Prophylaxis  Once        Question:  Reasons:  Answer:  Already adequately anticoagulated on oral Anticoagulants    07/27/23 2326                Discharge Planning   ELY: 8/3/2023     Code Status: Full Code   Is the patient medically ready for discharge?:     Reason for patient still in hospital (select all that  apply): Treatment  Discharge Plan A: Skilled Nursing Facility   Discharge Delays: (!) Post-Acute Set-up              Sea Phelan MD  Department of Hospital Medicine   [facility]

## 2023-08-02 NOTE — PLAN OF CARE
Problem: Physical Therapy  Goal: Physical Therapy Goal  Description: Goals to be met by: 23     Patient will increase functional independence with mobility by performin. Pt to be min A with bed mobility.  2. Pt to transfer with min A.  3. Pt to be able to tolerate standing with RW EOB; Pt to ambulate 15' w/RW min A.  4. Pt to be (I) with written HEP.    Outcome: Ongoing, Progressing   Pt not feeling well today however, agreeable to bed exs

## 2023-08-02 NOTE — PT/OT/SLP PROGRESS
"Physical Therapy Treatment    Patient Name:  Chato Bowie   MRN:  [id number]    Recommendations:     Discharge Recommendations: nursing facility, skilled  Discharge Equipment Recommendations: none      Assessment:     Chato Bowie is a 72 y.o. male admitted with a medical diagnosis of Chronic diastolic heart failure.  He presents with the following impairments/functional limitations: weakness, impaired endurance, impaired functional mobility, gait instability, impaired balance, impaired self care skills, decreased coordination, decreased upper extremity function, decreased lower extremity function, decreased safety awareness, pain, decreased ROM, impaired skin, edema, impaired cardiopulmonary response to activity, orthopedic precautions.    Rehab Prognosis: Fair; patient would benefit from acute skilled PT services to address these deficits and reach maximum level of function.    Recent Surgery: * No surgery found *      Plan:     During this hospitalization, patient to be seen 6 x/week to address the identified rehab impairments via gait training, therapeutic activities, therapeutic exercises, neuromuscular re-education, wheelchair management/training and progress toward the following goals:    Plan of Care Expires:  08/04/23    Subjective     Chief Complaint: "I can't get up today"  Patient/Family Comments/goals: Pt agreeable to bed exs  Pain/Comfort:  Pain Rating 1: 7/10  Location - Side 1: Right  Location - Orientation 1: upper  Location 1: thigh  Pain Addressed 1: Reposition      Objective:     Communicated with nurseRegina prior to session.  Patient found supine with pressure relief boots, telemetry upon PT entry to room.     General Precautions: Standard, fall, respiratory, airborne, contact, droplet (COVID 19+)  Orthopedic Precautions: N/A  Braces: N/A  Respiratory Status: Room air     Functional Mobility:  Bed Mobility:     Rolling Left:  modified independence  Bridging: minimum assistance  Pt able to " scoot to HOB with min A to hold (R) ankle      Treatment & Education:  Pt educated pt on importance of mobility/repositioning while in bed to decreased pressure, performed 1x10 reps BLE exs in supine including APs, QS, and GS; repositioned to  (L) sidelying with pillow in back and between knees and positioning boots on.      GOALS:   Multidisciplinary Problems       Physical Therapy Goals          Problem: Physical Therapy    Goal Priority Disciplines Outcome Goal Variances Interventions   Physical Therapy Goal     PT, PT/OT Ongoing, Progressing     Description: Goals to be met by: 23     Patient will increase functional independence with mobility by performin. Pt to be min A with bed mobility.  2. Pt to transfer with min A.  3. Pt to be able to tolerate standing with RW EOB; Pt to ambulate 15' w/RW min A.  4. Pt to be (I) with written HEP.                         Time Tracking:     PT Received On: 23  PT Start Time: 1552     PT Stop Time: 1607  PT Total Time (min): 15 min     Billable Minutes: Therapeutic Exercise 15    Treatment Type: Treatment  PT/PTA: PT     Number of PTA visits since last PT visit: 2023

## 2023-08-02 NOTE — NURSING
[facility], [facility] - [place]  Nurses Note -- 4 Eyes      8/2/2023       Skin assessed on: Q Shift      [] No Pressure Injuries Present    []Prevention Measures Documented    [x] Yes LDA  for Pressure Injury Previously documented     [] Yes New Pressure Injury Discovered   [] LDA for New Pressure Injury Added      Attending RN:  Regina Staples RN     Second LPN: Ethel

## 2023-08-02 NOTE — PLAN OF CARE
Problem: Adult Inpatient Plan of Care  Goal: Patient-Specific Goal (Individualized)  Outcome: Ongoing, Progressing     Problem: Adult Inpatient Plan of Care  Goal: Patient-Specific Goal (Individualized)  Outcome: Ongoing, Progressing     Problem: Adult Inpatient Plan of Care  Goal: Patient-Specific Goal (Individualized)  Outcome: Ongoing, Progressing     Problem: Adult Inpatient Plan of Care  Goal: Plan of Care Review  Flowsheets (Taken 8/2/2023 0815)  Plan of Care Reviewed With: patient     Problem: Adult Inpatient Plan of Care  Goal: Plan of Care Review  Flowsheets (Taken 8/2/2023 0815)  Plan of Care Reviewed With: patient     Problem: Adult Inpatient Plan of Care  Goal: Plan of Care Review  Flowsheets (Taken 8/2/2023 0815)  Plan of Care Reviewed With: patient

## 2023-08-02 NOTE — NURSING
[facility] - [place]  Nurses Note -- 4 Eyes      8/1/2023       Skin assessed on: Q Shift      [] No Pressure Injuries Present    []Prevention Measures Documented    [x] Yes LDA  for Pressure Injury Previously documented     [] Yes New Pressure Injury Discovered   [] LDA for New Pressure Injury Added      Attending RN:  Regina Staples RN     Second LPN: Delica

## 2023-08-03 PROBLEM — E11.29 DM (DIABETES MELLITUS) TYPE II CONTROLLED WITH RENAL MANIFESTATION: Status: RESOLVED | Noted: 2022-05-12 | Resolved: 2023-08-03

## 2023-08-03 LAB
ANION GAP SERPL CALC-SCNC: 10 MMOL/L (ref 8–16)
BUN SERPL-MCNC: 72 MG/DL (ref 8–23)
CALCIUM SERPL-MCNC: 8.6 MG/DL (ref 8.7–10.5)
CHLORIDE SERPL-SCNC: 101 MMOL/L (ref 95–110)
CO2 SERPL-SCNC: 26 MMOL/L (ref 23–29)
CREAT SERPL-MCNC: 2.2 MG/DL (ref 0.5–1.4)
D DIMER PPP IA.FEU-MCNC: 2.15 MG/L FEU
EST. GFR  (NO RACE VARIABLE): 31 ML/MIN/1.73 M^2
GLUCOSE SERPL-MCNC: 217 MG/DL (ref 70–110)
LDH SERPL L TO P-CCNC: 342 U/L (ref 110–260)
MAGNESIUM SERPL-MCNC: 1.6 MG/DL (ref 1.6–2.6)
POCT GLUCOSE: 154 MG/DL (ref 70–110)
POCT GLUCOSE: 172 MG/DL (ref 70–110)
POCT GLUCOSE: 187 MG/DL (ref 70–110)
POCT GLUCOSE: 241 MG/DL (ref 70–110)
POCT GLUCOSE: 257 MG/DL (ref 70–110)
POCT GLUCOSE: 280 MG/DL (ref 70–110)
POTASSIUM SERPL-SCNC: 3.3 MMOL/L (ref 3.5–5.1)
SODIUM SERPL-SCNC: 137 MMOL/L (ref 136–145)

## 2023-08-03 PROCEDURE — 36415 COLL VENOUS BLD VENIPUNCTURE: CPT | Performed by: NURSE PRACTITIONER

## 2023-08-03 PROCEDURE — 83615 LACTATE (LD) (LDH) ENZYME: CPT | Performed by: NURSE PRACTITIONER

## 2023-08-03 PROCEDURE — 25000003 PHARM REV CODE 250: Performed by: STUDENT IN AN ORGANIZED HEALTH CARE EDUCATION/TRAINING PROGRAM

## 2023-08-03 PROCEDURE — 85379 FIBRIN DEGRADATION QUANT: CPT | Performed by: NURSE PRACTITIONER

## 2023-08-03 PROCEDURE — 94761 N-INVAS EAR/PLS OXIMETRY MLT: CPT

## 2023-08-03 PROCEDURE — 63600175 PHARM REV CODE 636 W HCPCS: Mod: JZ,TB | Performed by: NURSE PRACTITIONER

## 2023-08-03 PROCEDURE — 25000003 PHARM REV CODE 250

## 2023-08-03 PROCEDURE — 25000003 PHARM REV CODE 250: Performed by: NURSE PRACTITIONER

## 2023-08-03 PROCEDURE — 25000003 PHARM REV CODE 250: Performed by: HOSPITALIST

## 2023-08-03 PROCEDURE — 63600175 PHARM REV CODE 636 W HCPCS: Performed by: HOSPITALIST

## 2023-08-03 PROCEDURE — 80048 BASIC METABOLIC PNL TOTAL CA: CPT | Performed by: STUDENT IN AN ORGANIZED HEALTH CARE EDUCATION/TRAINING PROGRAM

## 2023-08-03 PROCEDURE — 97535 SELF CARE MNGMENT TRAINING: CPT

## 2023-08-03 PROCEDURE — 83735 ASSAY OF MAGNESIUM: CPT | Performed by: STUDENT IN AN ORGANIZED HEALTH CARE EDUCATION/TRAINING PROGRAM

## 2023-08-03 PROCEDURE — 63600175 PHARM REV CODE 636 W HCPCS

## 2023-08-03 PROCEDURE — 27000207 HC ISOLATION

## 2023-08-03 PROCEDURE — 97530 THERAPEUTIC ACTIVITIES: CPT

## 2023-08-03 PROCEDURE — 25000242 PHARM REV CODE 250 ALT 637 W/ HCPCS: Performed by: NURSE PRACTITIONER

## 2023-08-03 PROCEDURE — 11000001 HC ACUTE MED/SURG PRIVATE ROOM

## 2023-08-03 PROCEDURE — 94640 AIRWAY INHALATION TREATMENT: CPT

## 2023-08-03 PROCEDURE — 63600175 PHARM REV CODE 636 W HCPCS: Performed by: STUDENT IN AN ORGANIZED HEALTH CARE EDUCATION/TRAINING PROGRAM

## 2023-08-03 RX ORDER — ALLOPURINOL 100 MG/1
100 TABLET ORAL DAILY
Status: DISCONTINUED | OUTPATIENT
Start: 2023-08-03 | End: 2023-08-16 | Stop reason: HOSPADM

## 2023-08-03 RX ORDER — POTASSIUM CHLORIDE 20 MEQ/1
40 TABLET, EXTENDED RELEASE ORAL ONCE
Status: COMPLETED | OUTPATIENT
Start: 2023-08-03 | End: 2023-08-03

## 2023-08-03 RX ORDER — FUROSEMIDE 10 MG/ML
60 INJECTION INTRAMUSCULAR; INTRAVENOUS
Status: DISCONTINUED | OUTPATIENT
Start: 2023-08-03 | End: 2023-08-05

## 2023-08-03 RX ADMIN — OXYCODONE HYDROCHLORIDE AND ACETAMINOPHEN 500 MG: 500 TABLET ORAL at 09:08

## 2023-08-03 RX ADMIN — AMLODIPINE BESYLATE 10 MG: 5 TABLET ORAL at 09:08

## 2023-08-03 RX ADMIN — INSULIN ASPART 12 UNITS: 100 INJECTION, SOLUTION INTRAVENOUS; SUBCUTANEOUS at 09:08

## 2023-08-03 RX ADMIN — GABAPENTIN 100 MG: 100 CAPSULE ORAL at 09:08

## 2023-08-03 RX ADMIN — Medication 6 MG: at 10:08

## 2023-08-03 RX ADMIN — FUROSEMIDE 40 MG: 10 INJECTION, SOLUTION INTRAVENOUS at 09:08

## 2023-08-03 RX ADMIN — INSULIN ASPART 12 UNITS: 100 INJECTION, SOLUTION INTRAVENOUS; SUBCUTANEOUS at 05:08

## 2023-08-03 RX ADMIN — MUPIROCIN: 20 OINTMENT TOPICAL at 10:08

## 2023-08-03 RX ADMIN — ATORVASTATIN CALCIUM 80 MG: 40 TABLET, FILM COATED ORAL at 09:08

## 2023-08-03 RX ADMIN — ALBUTEROL SULFATE 2.5 MG: 2.5 SOLUTION RESPIRATORY (INHALATION) at 08:08

## 2023-08-03 RX ADMIN — INSULIN ASPART 12 UNITS: 100 INJECTION, SOLUTION INTRAVENOUS; SUBCUTANEOUS at 12:08

## 2023-08-03 RX ADMIN — APIXABAN 5 MG: 5 TABLET, FILM COATED ORAL at 09:08

## 2023-08-03 RX ADMIN — MUPIROCIN: 20 OINTMENT TOPICAL at 09:08

## 2023-08-03 RX ADMIN — THERA TABS 1 TABLET: TAB at 09:08

## 2023-08-03 RX ADMIN — FUROSEMIDE 60 MG: 10 INJECTION, SOLUTION INTRAMUSCULAR; INTRAVENOUS at 09:08

## 2023-08-03 RX ADMIN — INSULIN ASPART 2 UNITS: 100 INJECTION, SOLUTION INTRAVENOUS; SUBCUTANEOUS at 05:08

## 2023-08-03 RX ADMIN — REMDESIVIR 100 MG: 100 INJECTION, POWDER, LYOPHILIZED, FOR SOLUTION INTRAVENOUS at 09:08

## 2023-08-03 RX ADMIN — AMIODARONE HYDROCHLORIDE 200 MG: 200 TABLET ORAL at 09:08

## 2023-08-03 RX ADMIN — TAMSULOSIN HYDROCHLORIDE 0.4 MG: 0.4 CAPSULE ORAL at 09:08

## 2023-08-03 RX ADMIN — FOLIC ACID 1 MG: 1 TABLET ORAL at 09:08

## 2023-08-03 RX ADMIN — POTASSIUM CHLORIDE 40 MEQ: 1500 TABLET, EXTENDED RELEASE ORAL at 09:08

## 2023-08-03 RX ADMIN — LIDOCAINE 5% 1 PATCH: 700 PATCH TOPICAL at 05:08

## 2023-08-03 RX ADMIN — ALLOPURINOL 100 MG: 100 TABLET ORAL at 09:08

## 2023-08-03 RX ADMIN — METOPROLOL SUCCINATE 25 MG: 25 TABLET, EXTENDED RELEASE ORAL at 09:08

## 2023-08-03 RX ADMIN — INSULIN ASPART 4 UNITS: 100 INJECTION, SOLUTION INTRAVENOUS; SUBCUTANEOUS at 09:08

## 2023-08-03 RX ADMIN — INSULIN ASPART 6 UNITS: 100 INJECTION, SOLUTION INTRAVENOUS; SUBCUTANEOUS at 12:08

## 2023-08-03 RX ADMIN — ALBUTEROL SULFATE 2.5 MG: 2.5 SOLUTION RESPIRATORY (INHALATION) at 07:08

## 2023-08-03 NOTE — PT/OT/SLP PROGRESS
Physical Therapy      Patient Name:  Chato Bowie   MRN:  [id number]    Patient not seen today secondary to Other (working with OT). Will follow-up.

## 2023-08-03 NOTE — PLAN OF CARE
Plan for patient discharge to snf. Spoke with patient's niece, Aleena, stated the preferred facility will be [facility] and plans to meet at the facility tomorrow to complete paperwork. Ms Aleena stated she is working on retrieving his financial documentation today.     Spoke with Adrianna with [facility] , stated she had received response from patient's [employer] managed medicare stated authorization had been approved for another facility.     Spoke with Mandy PISANO with [employer]are, [phone] stated there is no current SNF authorization on file for the patient.     Spoke with Love with [facility] stated will re-submit auth for [facility] . TN to follow up

## 2023-08-03 NOTE — NURSING
Patient's niece, Leigh, arrived to unit to take pictures of patient's documents to take to [facility]. All documents were given back to patient.

## 2023-08-03 NOTE — PT/OT/SLP PROGRESS
Occupational Therapy   Treatment    Name: Chato Bowie  MRN: [id number]  Admitting Diagnosis:  Chronic diastolic heart failure       Recommendations:     Discharge Recommendations: nursing facility, skilled  Discharge Equipment Recommendations:  none  Barriers to discharge:  None    Assessment:     Chato Bowie is a 72 y.o. male with a medical diagnosis of Chronic diastolic heart failure.  He presents with HOB elevated with urination accident in bed with soaked pads. Performance deficits affecting function are weakness, impaired endurance, impaired sensation, impaired self care skills, impaired functional mobility, gait instability, impaired balance, decreased coordination, decreased lower extremity function, decreased safety awareness, decreased ROM, impaired skin, edema.     Rehab Prognosis:  Good; patient would benefit from acute skilled OT services to address these deficits and reach maximum level of function.       Plan:     Patient to be seen 5 x/week to address the above listed problems via self-care/home management, therapeutic activities, therapeutic exercises  Plan of Care Expires: 08/18/23  Plan of Care Reviewed with: patient    Subjective     Chief Complaint: weakness of left lower extremity and edema B lower extremity   Patient/Family Comments/goals: snf rehab  Pain/Comfort:  Pain Rating 1: 0/10  Location - Side 1: Right  Location 1: knee  Pain Rating Post-Intervention 1: 1/10    Objective:     Communicated with: RNRoberto, prior to session.  Patient found HOB elevated with pressure relief boots, telemetry upon OT entry to room.    General Precautions: Standard, airborne, contact, droplet, fall    Orthopedic Precautions:N/A  Braces: N/A  Respiratory Status: Room air     Occupational Performance:     Bed Mobility:    Patient completed Rolling/Turning to Left with  supervision  Patient completed Scooting/Bridging with supervision  Patient completed Supine to Sit with supervision     Functional  Mobility/Transfers:  Patient completed Sit <> Stand Transfer with supervision  with  rolling walker   Patient completed Bed <> Chair Transfer using walking technique with supervision with rolling walker  Patient completed Toilet Transfer walking technique with supervision with  rolling walker  Functional Mobility: Patient walked from bed to bathroom and to chair x approx. 20' with close supervision with RW.     Activities of Daily Living:  Toileting: total assistance to remove soiled linens and provide hygiene       AMPA 6 Click ADL: 12    Treatment & Education:  Patient educated about OOB mobility from bed to chair to increase cardiovascular endurance and strength and to relieve pressure on sacral wounds    Patient left up in chair with all lines intact, call button in reach, and RN  notified    GOALS:   Multidisciplinary Problems       Occupational Therapy Goals          Problem: Occupational Therapy    Goal Priority Disciplines Outcome Interventions   Occupational Therapy Goal     OT, PT/OT Ongoing, Progressing    Description: Goals to be met by: 8/18/23     Patient will increase functional independence with ADLs by performing:    UE Dressing with [place].  LE Dressing with Modified [place].  Grooming while seated at sink with [place].  Toileting from toilet with Modified [place] for hygiene and clothing management.   Bathing from  edge of bed with Minimal Assistance.  Toilet transfer to toilet with Modified [place].  Upper extremity exercise program x10-15 reps per handout, with supervision.      Patient seen by occupational therapy for initial evaluation, so see notes for more info. Patient is having difficulty taking care of himself at home, so needs SNF care. Occupational therapy to see 5x/week.                        Time Tracking:     OT Date of Treatment: 08/03/23  OT Start Time: 1500  OT Stop Time: 1530  OT Total Time (min): 30 min    Billable Minutes:Self Care/Home Management 15    Therapeutic Activity 15     OT/JASON: OT          8/3/2023

## 2023-08-03 NOTE — NURSING
[facility] [place]  Nurses Note -- 4 Eyes      8/2/2023       Skin assessed on: Q Shift      [] No Pressure Injuries Present    []Prevention Measures Documented    [x] Yes LDA  for Pressure Injury Previously documented     [] Yes New Pressure Injury Discovered   [] LDA for New Pressure Injury Added      Attending RN:  Ethel Hernandez LPN     Second RN:  Regina Staples RN

## 2023-08-03 NOTE — PLAN OF CARE
Problem: Occupational Therapy  Goal: Occupational Therapy Goal  Description: Goals to be met by: 8/18/23     Patient will increase functional independence with ADLs by performing:    UE Dressing with [place].  LE Dressing with Modified [place].  Grooming while seated at sink with [place].  Toileting from toilet with Modified [place] for hygiene and clothing management.   Bathing from  edge of bed with Minimal Assistance.  Toilet transfer to toilet with Modified [place].  Upper extremity exercise program x10-15 reps per handout, with supervision.    Outcome: Ongoing, Progressing     Patient walked to bathroom with RW with close supervision and stood to urinate, but he had accident recently, so he could not urinate. He walked to chair to sit up next to bed with RW with close supervision.  Occupational therapy notified RN of open wound and urination accident.

## 2023-08-03 NOTE — NURSING
Received report from Regina. Patient lying in bed resting, NAD noted. Safety Precautions maintained, will monitor.

## 2023-08-03 NOTE — PLAN OF CARE
Patient AAOx4. No acute distress noted, patient free from falls or injury this shift.  Bed in low position, wheels locked, call light in reach for assistance, plan of care continued.       Problem: Glycemic Control Impaired (Sepsis/Septic Shock)  Goal: Blood Glucose Level Within Desired Range  Outcome: Ongoing, Progressing  Intervention: Optimize Glycemic Control  Flowsheets (Taken 8/3/2023 0612)  Glycemic Management: blood glucose monitored     Problem: Skin Injury Risk Increased  Goal: Skin Health and Integrity  Outcome: Ongoing, Progressing  Intervention: Optimize Skin Protection  Flowsheets (Taken 8/3/2023 0612)  Pressure Reduction Devices: positioning supports utilized  Head of Bed (HOB) Positioning: HOB elevated

## 2023-08-04 PROBLEM — D63.8 ANEMIA IN OTHER CHRONIC DISEASES CLASSIFIED ELSEWHERE: Chronic | Status: RESOLVED | Noted: 2023-07-28 | Resolved: 2023-08-04

## 2023-08-04 LAB
ANION GAP SERPL CALC-SCNC: 8 MMOL/L (ref 8–16)
ANISOCYTOSIS BLD QL SMEAR: SLIGHT
BASOPHILS # BLD AUTO: ABNORMAL K/UL (ref 0–0.2)
BASOPHILS NFR BLD: 0 % (ref 0–1.9)
BUN SERPL-MCNC: 77 MG/DL (ref 8–23)
CALCIUM SERPL-MCNC: 8.4 MG/DL (ref 8.7–10.5)
CHLORIDE SERPL-SCNC: 103 MMOL/L (ref 95–110)
CO2 SERPL-SCNC: 26 MMOL/L (ref 23–29)
CREAT SERPL-MCNC: 2.1 MG/DL (ref 0.5–1.4)
DIFFERENTIAL METHOD: ABNORMAL
EOSINOPHIL # BLD AUTO: ABNORMAL K/UL (ref 0–0.5)
EOSINOPHIL NFR BLD: 2 % (ref 0–8)
ERYTHROCYTE [DISTWIDTH] IN BLOOD BY AUTOMATED COUNT: 15.6 % (ref 11.5–14.5)
EST. GFR  (NO RACE VARIABLE): 33 ML/MIN/1.73 M^2
GLUCOSE SERPL-MCNC: 83 MG/DL (ref 70–110)
HCT VFR BLD AUTO: 26.8 % (ref 40–54)
HGB BLD-MCNC: 8.8 G/DL (ref 14–18)
HYPOCHROMIA BLD QL SMEAR: ABNORMAL
IMM GRANULOCYTES # BLD AUTO: ABNORMAL K/UL (ref 0–0.04)
IMM GRANULOCYTES NFR BLD AUTO: ABNORMAL % (ref 0–0.5)
LYMPHOCYTES # BLD AUTO: ABNORMAL K/UL (ref 1–4.8)
LYMPHOCYTES NFR BLD: 9 % (ref 18–48)
MAGNESIUM SERPL-MCNC: 1.4 MG/DL (ref 1.6–2.6)
MCH RBC QN AUTO: 26.9 PG (ref 27–31)
MCHC RBC AUTO-ENTMCNC: 32.8 G/DL (ref 32–36)
MCV RBC AUTO: 82 FL (ref 82–98)
METAMYELOCYTES NFR BLD MANUAL: 2 %
MONOCYTES # BLD AUTO: ABNORMAL K/UL (ref 0.3–1)
MONOCYTES NFR BLD: 12 % (ref 4–15)
MYELOCYTES NFR BLD MANUAL: 4 %
NEUTROPHILS NFR BLD: 71 % (ref 38–73)
NRBC BLD-RTO: 1 /100 WBC
PLATELET # BLD AUTO: 348 K/UL (ref 150–450)
PLATELET BLD QL SMEAR: ABNORMAL
PMV BLD AUTO: 8.8 FL (ref 9.2–12.9)
POCT GLUCOSE: 205 MG/DL (ref 70–110)
POCT GLUCOSE: 230 MG/DL (ref 70–110)
POCT GLUCOSE: 303 MG/DL (ref 70–110)
POCT GLUCOSE: 61 MG/DL (ref 70–110)
POCT GLUCOSE: 74 MG/DL (ref 70–110)
POTASSIUM SERPL-SCNC: 3.4 MMOL/L (ref 3.5–5.1)
RBC # BLD AUTO: 3.27 M/UL (ref 4.6–6.2)
SODIUM SERPL-SCNC: 137 MMOL/L (ref 136–145)
WBC # BLD AUTO: 9.81 K/UL (ref 3.9–12.7)

## 2023-08-04 PROCEDURE — 27000207 HC ISOLATION

## 2023-08-04 PROCEDURE — 25000003 PHARM REV CODE 250: Performed by: NURSE PRACTITIONER

## 2023-08-04 PROCEDURE — 94640 AIRWAY INHALATION TREATMENT: CPT

## 2023-08-04 PROCEDURE — 83735 ASSAY OF MAGNESIUM: CPT | Performed by: HOSPITALIST

## 2023-08-04 PROCEDURE — 25000003 PHARM REV CODE 250: Performed by: HOSPITALIST

## 2023-08-04 PROCEDURE — 80048 BASIC METABOLIC PNL TOTAL CA: CPT | Performed by: STUDENT IN AN ORGANIZED HEALTH CARE EDUCATION/TRAINING PROGRAM

## 2023-08-04 PROCEDURE — 11000001 HC ACUTE MED/SURG PRIVATE ROOM

## 2023-08-04 PROCEDURE — 36415 COLL VENOUS BLD VENIPUNCTURE: CPT | Performed by: STUDENT IN AN ORGANIZED HEALTH CARE EDUCATION/TRAINING PROGRAM

## 2023-08-04 PROCEDURE — 25000003 PHARM REV CODE 250

## 2023-08-04 PROCEDURE — 97116 GAIT TRAINING THERAPY: CPT | Mod: CQ

## 2023-08-04 PROCEDURE — 25000242 PHARM REV CODE 250 ALT 637 W/ HCPCS: Performed by: NURSE PRACTITIONER

## 2023-08-04 PROCEDURE — 97110 THERAPEUTIC EXERCISES: CPT | Mod: CQ

## 2023-08-04 PROCEDURE — 97110 THERAPEUTIC EXERCISES: CPT

## 2023-08-04 PROCEDURE — 97530 THERAPEUTIC ACTIVITIES: CPT

## 2023-08-04 PROCEDURE — 25000003 PHARM REV CODE 250: Performed by: STUDENT IN AN ORGANIZED HEALTH CARE EDUCATION/TRAINING PROGRAM

## 2023-08-04 PROCEDURE — 94761 N-INVAS EAR/PLS OXIMETRY MLT: CPT

## 2023-08-04 PROCEDURE — 85027 COMPLETE CBC AUTOMATED: CPT | Performed by: HOSPITALIST

## 2023-08-04 PROCEDURE — 85007 BL SMEAR W/DIFF WBC COUNT: CPT | Performed by: HOSPITALIST

## 2023-08-04 PROCEDURE — 63600175 PHARM REV CODE 636 W HCPCS: Performed by: HOSPITALIST

## 2023-08-04 RX ORDER — INSULIN ASPART 100 [IU]/ML
5 INJECTION, SOLUTION INTRAVENOUS; SUBCUTANEOUS
Status: DISCONTINUED | OUTPATIENT
Start: 2023-08-04 | End: 2023-08-04

## 2023-08-04 RX ORDER — ALBUTEROL SULFATE 2.5 MG/.5ML
2.5 SOLUTION RESPIRATORY (INHALATION) EVERY 4 HOURS PRN
Status: DISCONTINUED | OUTPATIENT
Start: 2023-08-04 | End: 2023-08-16 | Stop reason: HOSPADM

## 2023-08-04 RX ORDER — POTASSIUM CHLORIDE 20 MEQ/1
40 TABLET, EXTENDED RELEASE ORAL ONCE
Status: COMPLETED | OUTPATIENT
Start: 2023-08-04 | End: 2023-08-04

## 2023-08-04 RX ORDER — MAGNESIUM SULFATE HEPTAHYDRATE 40 MG/ML
2 INJECTION, SOLUTION INTRAVENOUS ONCE
Status: COMPLETED | OUTPATIENT
Start: 2023-08-04 | End: 2023-08-04

## 2023-08-04 RX ORDER — INSULIN ASPART 100 [IU]/ML
10 INJECTION, SOLUTION INTRAVENOUS; SUBCUTANEOUS
Status: DISCONTINUED | OUTPATIENT
Start: 2023-08-05 | End: 2023-08-05

## 2023-08-04 RX ADMIN — ALLOPURINOL 100 MG: 100 TABLET ORAL at 09:08

## 2023-08-04 RX ADMIN — MUPIROCIN: 20 OINTMENT TOPICAL at 08:08

## 2023-08-04 RX ADMIN — CAPSAICIN: 0.25 CREAM TOPICAL at 05:08

## 2023-08-04 RX ADMIN — OXYCODONE HYDROCHLORIDE AND ACETAMINOPHEN 500 MG: 500 TABLET ORAL at 09:08

## 2023-08-04 RX ADMIN — OXYCODONE HYDROCHLORIDE AND ACETAMINOPHEN 500 MG: 500 TABLET ORAL at 08:08

## 2023-08-04 RX ADMIN — INSULIN DETEMIR 30 UNITS: 100 INJECTION, SOLUTION SUBCUTANEOUS at 08:08

## 2023-08-04 RX ADMIN — GABAPENTIN 100 MG: 100 CAPSULE ORAL at 09:08

## 2023-08-04 RX ADMIN — ALBUTEROL SULFATE 2.5 MG: 2.5 SOLUTION RESPIRATORY (INHALATION) at 08:08

## 2023-08-04 RX ADMIN — LIDOCAINE 5% 1 PATCH: 700 PATCH TOPICAL at 05:08

## 2023-08-04 RX ADMIN — INSULIN ASPART 2 UNITS: 100 INJECTION, SOLUTION INTRAVENOUS; SUBCUTANEOUS at 08:08

## 2023-08-04 RX ADMIN — AMIODARONE HYDROCHLORIDE 200 MG: 200 TABLET ORAL at 09:08

## 2023-08-04 RX ADMIN — FUROSEMIDE 60 MG: 10 INJECTION, SOLUTION INTRAMUSCULAR; INTRAVENOUS at 09:08

## 2023-08-04 RX ADMIN — APIXABAN 5 MG: 5 TABLET, FILM COATED ORAL at 08:08

## 2023-08-04 RX ADMIN — CAPSAICIN: 0.25 CREAM TOPICAL at 09:08

## 2023-08-04 RX ADMIN — INSULIN ASPART 5 UNITS: 100 INJECTION, SOLUTION INTRAVENOUS; SUBCUTANEOUS at 11:08

## 2023-08-04 RX ADMIN — THERA TABS 1 TABLET: TAB at 09:08

## 2023-08-04 RX ADMIN — ATORVASTATIN CALCIUM 80 MG: 40 TABLET, FILM COATED ORAL at 09:08

## 2023-08-04 RX ADMIN — INSULIN ASPART 4 UNITS: 100 INJECTION, SOLUTION INTRAVENOUS; SUBCUTANEOUS at 11:08

## 2023-08-04 RX ADMIN — METOPROLOL SUCCINATE 25 MG: 25 TABLET, EXTENDED RELEASE ORAL at 09:08

## 2023-08-04 RX ADMIN — INSULIN ASPART 5 UNITS: 100 INJECTION, SOLUTION INTRAVENOUS; SUBCUTANEOUS at 05:08

## 2023-08-04 RX ADMIN — POTASSIUM CHLORIDE 40 MEQ: 1500 TABLET, EXTENDED RELEASE ORAL at 10:08

## 2023-08-04 RX ADMIN — HYDROCODONE BITARTRATE AND ACETAMINOPHEN 1 TABLET: 5; 325 TABLET ORAL at 05:08

## 2023-08-04 RX ADMIN — TAMSULOSIN HYDROCHLORIDE 0.4 MG: 0.4 CAPSULE ORAL at 09:08

## 2023-08-04 RX ADMIN — MAGNESIUM SULFATE HEPTAHYDRATE 2 G: 40 INJECTION, SOLUTION INTRAVENOUS at 10:08

## 2023-08-04 RX ADMIN — POLYETHYLENE GLYCOL 3350 17 G: 17 POWDER, FOR SOLUTION ORAL at 09:08

## 2023-08-04 RX ADMIN — GABAPENTIN 100 MG: 100 CAPSULE ORAL at 08:08

## 2023-08-04 RX ADMIN — MUPIROCIN: 20 OINTMENT TOPICAL at 09:08

## 2023-08-04 RX ADMIN — FOLIC ACID 1 MG: 1 TABLET ORAL at 09:08

## 2023-08-04 RX ADMIN — AMLODIPINE BESYLATE 10 MG: 5 TABLET ORAL at 09:08

## 2023-08-04 RX ADMIN — APIXABAN 5 MG: 5 TABLET, FILM COATED ORAL at 09:08

## 2023-08-04 RX ADMIN — INSULIN ASPART 8 UNITS: 100 INJECTION, SOLUTION INTRAVENOUS; SUBCUTANEOUS at 05:08

## 2023-08-04 NOTE — PLAN OF CARE
Problem: Occupational Therapy  Goal: Occupational Therapy Goal  Description: Goals to be met by: 8/18/23     Patient will increase functional independence with ADLs by performing:    UE Dressing with [place].  LE Dressing with Modified [place].  Grooming while seated at sink with [place].  Toileting from toilet with Modified [place] for hygiene and clothing management.   Bathing from  edge of bed with Minimal Assistance.  Toilet transfer to toilet with Modified [place].  Upper extremity exercise program x10-15 reps per handout, with supervision.      Patient seen by occupational therapy for initial evaluation, so see notes for more info. Patient is having difficulty taking care of himself at home, so needs SNF care. Occupational therapy to see 5x/week.   Outcome: Ongoing, Progressing

## 2023-08-04 NOTE — ASSESSMENT & PLAN NOTE
-A1c 7.8  -Noted significant hyperglycemia earlier in stay   -Improved but down to 70s this morning - no symptoms  -Decrease detemir to 30 units qhs and aspart to 10 units tidwm

## 2023-08-04 NOTE — ASSESSMENT & PLAN NOTE
-BP normal to mildly elevated  -Continue home antihypertensives-amlodipine, hydralazine, metoprolol with parameters  -Prn hydralazine SBP >180

## 2023-08-04 NOTE — ASSESSMENT & PLAN NOTE
-Admitted to inpatient status  -On admit had severe edema / anasarca - BLE up to hips and abdomen with edema inhibiting ambulation.  -Noted recent 2 D echo in June HFpEF.   -Strict ins/outs and daily weights  -Continue IV diuresis but increase lasix to 60mg q12h - still with significant deep pitting lower extremity edema

## 2023-08-04 NOTE — ASSESSMENT & PLAN NOTE
Baseline Cr 2.7-3.0.   -Cr now 2.2  -Remains fluid overloaded with edema  -Avoid nephrotoxic agents and renally dose meds  -diurese as above  -Repeat BMP in AM

## 2023-08-04 NOTE — ASSESSMENT & PLAN NOTE
-Hgb drop this admission and compared to last month hgb 10-11 range.   -No melena or hematochezia noted.  -3/2016 EGD normal esophagus and erythematous stomach and duodenum but no mention ulcers/erosions  -CKD IV also contributing   -Low iron and t stat elevated ferritin, normal vitamin b 12, low folic acid  -Held eliquis initially but now resumed.  -Received 1 unit PRBC and added PPI.  -Started folic acid, ferrous sulfate  -No cbc since 7/31 - will repeat in AM.

## 2023-08-04 NOTE — ASSESSMENT & PLAN NOTE
Baseline Cr 2.7-3.0.   -Cr now 2.1  -Remains fluid overloaded with edema  -Avoid nephrotoxic agents and renally dose meds  -Diurese as above  -Repeat BMP in AM

## 2023-08-04 NOTE — ASSESSMENT & PLAN NOTE
-Due to HFpEF, CKD, malnutrition, COVID 19 infection and immobility- addressing these issues separately.

## 2023-08-04 NOTE — NURSING
Nurses Note -- 4 Eyes      8/3/2023   8:18 PM      Skin assessed during: Q Shift Change      [] No Altered Skin Integrity Present    []Prevention Measures Documented      [x] Yes- Altered Skin Integrity Present or Discovered   [] LDA Added if Not in Epic (Describe Wound)   [] New Altered Skin Integrity was Present on Admit and Documented in LDA   [] Wound Image Taken    Wound Care Consulted? Yes    Attending Nurse:  Suly Marshall RN/Staff Member:   Roberto COOK

## 2023-08-04 NOTE — ASSESSMENT & PLAN NOTE
-Initially asymptomatic now with non productive cough, 7/29 night 89% improve with O2 2 LNC  -CXR showed increase opacities and small pleural effusions  -Treated with dexamethasone, remdsivir, prn tessalon, albuterol inhaler bid and incentive spirometry  -Stopped dexamethasone due to hyperglycemia and he clinically improved  -Now on room air and symptom free  -Continue in covid isolation  -Complete remdesivir course

## 2023-08-04 NOTE — PLAN OF CARE
Case Management Re-assessment      [facility]  Pharmacy: [id number] - [place], [place] - [address]        Patient Arrived From: Home  Existing Help at Home: Jyoti [phone]      Barriers to Discharge: Financials for penitentiary placement     Discharge Plan:               A. SNF              B. New penitentiary placement    Received message from Suyapa with [facility], stated [employer] has offered a peer to peer for SNF for patient To be request by noon today. [place] to call  [phone] to schedule, physician notified.     Spoke with [employer] representative to scheduled peer to peer, per representative, physician to call [phone] to speak with Dr Dunne scheduled time @ 1:30pm, Dr Young notified.     Per Suyapa with [facility], updated received, [employer] denied SNF.     Spoke with patient, informed snf denied by [employer]. Pt agreeable for [facility] penitentiary placement. In depth discussion with pt, explained to patient that his income would be given to the NH in order to cover expenses as a resident of the facility, pt verbalized understanding and acceptance. Pt agreeable for his niece, Leigh to assist with financials and placement.     Spoke with pt's niece, Leigh, informed of denial of snf. Informed Ms Abraham that [place] had spoken with pt and pt is agreeable to penitentiary placement. Per Ms Abraham, the preference would be to remain on the [facility] if possible.     Additional referrals sent for penitentiary placement via care port. [place] to continue to follow up.      08/04/23 0900   Discharge Reassessment   Assessment Type Discharge Planning Reassessment   Did the patient's condition or plan change since previous assessment? No   Communicated ELY with patient/caregiver Date not available/Unable to determine   DME Needed Upon Discharge  none   Transition of Care Barriers Social;Other (see comments)   Why the patient remains in the hospital Requires continued medical care    Post-Acute Status   Post-Acute Authorization Placement   Post-Acute Placement Status Pending payor review/awaiting authorization (if required)   Discharge Delays (!) Post-Acute Set-up

## 2023-08-04 NOTE — PT/OT/SLP PROGRESS
"Occupational Therapy   Treatment    Name: Chato Bowie  MRN: [id number]  Admitting Diagnosis:  Chronic diastolic heart failure       Recommendations:     Discharge Recommendations: nursing facility, skilled  Discharge Equipment Recommendations:  to be determined by next level of care  Barriers to discharge:   (Pt is at risk for falls, readmission and morbidity if d/c home.)    Assessment:     Chato Bowie is a 72 y.o. male with a medical diagnosis of Chronic diastolic heart failure.   Performance deficits affecting function are weakness, impaired endurance, impaired self care skills, impaired functional mobility, gait instability, impaired balance, decreased upper extremity function, decreased coordination, decreased safety awareness, decreased lower extremity function, pain, edema.     Pt very pleasant and willing to participate in tx session this date; pt w/ weakness and decreased endurance but was able to participate in seated BUE TE for increasing overall strength and endurance for safe performance w/ ADLs and functional mobility. Pt was able to ambulate functional distances in room for completing chair transfer w/ min A and use of RW. Pt tolerated tx session well and will continue to benefit from skilled acute OT services to maximize functional capacity.     Rehab Prognosis:  Good; patient would benefit from acute skilled OT services to address these deficits and reach maximum level of function.       Plan:     Patient to be seen 5 x/week to address the above listed problems via self-care/home management, therapeutic activities, therapeutic exercises  Plan of Care Expires: 08/18/23  Plan of Care Reviewed with: patient    Subjective     Chief Complaint: "That knee hurts."   Patient/Family Comments/goals: Agreeable to participate  Pain/Comfort:  Pain Rating 1:  (Pt did not rate.)  Location - Side 1: Right  Location 1: knee  Pain Addressed 1: Reposition    Objective:     Communicated with: Nurse prior to session. "  Patient found HOB elevated with telemetry, bed alarm upon OT entry to room.    General Precautions: Standard, airborne, contact, droplet, fall    Orthopedic Precautions:N/A  Braces: N/A  Respiratory Status: Room air; spO2 93-96%     Occupational Performance:     Bed Mobility:    Patient completed Scooting/Bridging with stand by assistance  Patient completed Supine to Sit with stand by assistance     Functional Mobility/Transfers:  Patient completed Sit <> Stand Transfer with stand by assistance  with  rolling walker   Patient completed Bed <> Chair Transfer using Step Transfer technique with contact guard assistance with rolling walker  Functional Mobility: Pt ambulated 10 ft from bed > chair w/ close CGA and use of RW; pt encouraged to use BUE support on RW for offloading pressure to R knee as needed to prevent pain.     Activities of Daily Living:  Grooming: stand by assistance for washing face while sitting at EOB   Upper Body Dressing: minimum assistance for donning gown over back       [facility] 6 Click ADL: 15    Treatment & Education:  -Pt performed ADLs and functional mobility as noted above.   -Pt educated on role of OT and POC.   -Pt educated in importance of OOB activity w/ assist from staff.   -Pt educated on BUE TE w/ use of red theraband for 1 set x 12 reps for increasing UB strength and endurance for safe performance w/ ADLs and functional mobility:    -shoulder abd/add    -shoulder IR/ER    -diagonal shoulder raise    -elbow flexion   -Questions and concerns addressed within scope.     Patient left up in chair with all lines intact and call button in reach    GOALS:   Multidisciplinary Problems       Occupational Therapy Goals          Problem: Occupational Therapy    Goal Priority Disciplines Outcome Interventions   Occupational Therapy Goal     OT, PT/OT Ongoing, Progressing    Description: Goals to be met by: 8/18/23     Patient will increase functional independence with ADLs by performing:    UE  Dressing with [place].  LE Dressing with Modified [place].  Grooming while seated at sink with [place].  Toileting from toilet with Modified [place] for hygiene and clothing management.   Bathing from  edge of bed with Minimal Assistance.  Toilet transfer to toilet with Modified [place].  Upper extremity exercise program x10-15 reps per handout, with supervision.      Patient seen by occupational therapy for initial evaluation, so see notes for more info. Patient is having difficulty taking care of himself at home, so needs SNF care. Occupational therapy to see 5x/week.                        Time Tracking:     OT Date of Treatment: 08/04/23  OT Start Time: 1023  OT Stop Time: 1042  OT Total Time (min): 19 min    Billable Minutes:Therapeutic Exercise 19  Total Time 19    OT/JASON: OT          8/4/2023

## 2023-08-04 NOTE — ASSESSMENT & PLAN NOTE
-Admitted to inpatient status  -On admit had severe edema / anasarca - BLE up to hips and abdomen with edema inhibiting ambulation.  -Noted recent 2 D echo in June HFpEF.   -Strict ins/outs and daily weights  -Slowly improving, but still with significant deep edema in legs  -Continue with increased dosing of iv lasix - if clinically looks similar tomorrow will increase to TID dosing.

## 2023-08-04 NOTE — ASSESSMENT & PLAN NOTE
-Prior to admission patient used a roller walker for ambulation at baseline.  -On admit patient unable to ambulate even with walker due to swelling of his legs and left knee pain  -Xray of knee showed no acute findings  -orthopedic surgery consulted and input appreciated.  Ortho note 7/30 reviewed - report he does not ambulate and pain is chronic - he uses wheelchair and bed.  Noted L knee effusion felt secondary to arthritis and no evidence of infection or need for aspiration.  -PT/OT consulted and input appreciated.  Recommended SNF which we attempted to get approved, but after peer to peer today that was denied by his insurance company - [employer].    -Will discuss with patient and his niece - may need to consider NH placement.

## 2023-08-04 NOTE — ASSESSMENT & PLAN NOTE
Lab Results   Component Value Date    HGBA1C 7.8 (H) 06/02/2023   -A1c 7.8  =Noted significant hyperglycemia earlier in stay which is now improving  -Continue detemir 50 units qhs and aspart 12 units tidwm

## 2023-08-04 NOTE — PROGRESS NOTES
[facility] Medicine  Progress Note    Patient Name: Chato Bowie  MRN: [id number]  Patient Class: IP- Inpatient   Admission Date: 7/27/2023  Length of Stay: 5 days  Attending Physician: Martin Young MD  Primary Care Provider: Irlanda Valenzuela -        Subjective:     Principal Problem:Chronic diastolic heart failure        HPI:  Chato Bowie 72 y.o. male with CHF, CAD, DM, HTN, HLD, presents to the hospital with a chief complaint of BLE swelling and pain.  onset several weeks ago per chart review, patient was recently discharged from the ED yesterday for similar complaints, and was discharged from inpatient status on 07/25/2023. the patient had endorsed no relief to his complaints since discharge. Patient currently reports complaints of BLE swelling with associated pain. He states his complaints have been ongoing for a few weeks, but had worsened as of recent. He additionally reports he last had similar complaints a month ago, noting he had to be admitted for a couple of nights. He endorses compliance with antihypertensives and diuretics. He reports following with his PCP at [facility], but reports he does not know who is his cardiologist. Denies fever, shortness of breath, or cough.  Patient's niece states that the patient does not live in assisted living however he does need it as she can not provide care for him especially due to him being COVID positive.  No other alleviating or exacerbating factors noted.    In the ED patient was hypertensive (181/140) afebrile without leukocytosis, mild normocytic anemia noted, BUN 46, creatinine 2.7, EGFR 24, glucose 136, albumin 2.3, , TSH WNL, COVID positive, flu negative, UA showed +1 protein, +2 occult blood, CXR was negative for acute process.  Patient was placed in observation      Overview/Hospital Course:  Admission to hospital for BLE edema up hips due to acute on chronic diastolic heart failure and malnutrition. Patient also found  positive COVID 19 -asymptomatic on admission so Remdesivir discontinued. Edema improving with IV diuresis.  Became hypoxic night 7/29 requiring 2 L NC. Started Remdesivir/decadron (7/30), repeat CXR. Hypoglycemia (7/29) improved with oral intake and holding insulin.   He also have left  knee pain and edema similar to previous admission in June where Orthopedic surgery suspected gouty arthritis and attempted aspiration but only able to give steroid injection with improvement. No colchicine due to CKD. Left knee x ray on 7/17/23 showed significant suprapatellar effusion. Due to pain and decrease mobility, will ask Orthopedic Surgery to evaluate.  Hold apixaban  (last dose 7/29 2221).   Hgb treaded down 8.2>7.5>7.1>6.5 without over signs of bleeding. Had normal BM on 7/29 no melena or hematochezia. Previous iron studies low iron/t stat and elevated ferritin, normal vitamin b 12, and low folic acid. Start folic acid, ferrous sulfate and transfuse 1 unit of PRBC.   Recent admission 7/17-7/25 for sepsis due to shingles/cellulitis and ELIZABETH. Right buttock lesion from shingles are all in different part of healing phase without drainage. Also have gluteal cleft excoriation. Continue wound care as recommended by wound care team on previous admission-Cleanse both area with vashe, put mepilex to gluteal cleft, and put hydrogel to shingle lesion.   Also have right upper lateral back below axillary unroof blister and right foot ventral aspect skin tear. Keep clean and dry.     PT/OT evaluation completed. TN/SW to assist with SNF.               Interval History: No acute events overnight.  In bedside chair and denies pain.  States edema is improving, but still with deep 2+ edema in legs.  All questions answered and patient had no further complaints.      Objective:     Vital Signs (Most Recent):  Temp: 98.6 °F (37 °C) (08/04/23 1701)  Pulse: 62 (08/04/23 1701)  Resp: 18 (08/04/23 1701)  BP: 135/60 (08/04/23 1701)  SpO2: 96 %  (08/04/23 1701) Vital Signs (24h Range):  Temp:  [97.8 °F (36.6 °C)-98.8 °F (37.1 °C)] 98.6 °F (37 °C)  Pulse:  [57-64] 62  Resp:  [18-20] 18  SpO2:  [95 %-100 %] 96 %  BP: (120-142)/(57-67) 135/60     Weight: 98.9 kg (218 lb)  Body mass index is 30.4 kg/m².    Intake/Output Summary (Last 24 hours) at 8/4/2023 1833  Last data filed at 8/4/2023 1540  Gross per 24 hour   Intake 600 ml   Output 1625 ml   Net -1025 ml           Physical Exam  Vitals and nursing note reviewed.   Constitutional:       General: He is not in acute distress.     Appearance: He is well-developed. He is obese.   Cardiovascular:      Rate and Rhythm: Normal rate.   Pulmonary:      Effort: Pulmonary effort is normal. No respiratory distress.      Breath sounds: Normal breath sounds. No wheezing or rales.   Abdominal:      General: Bowel sounds are normal. There is no distension.      Palpations: Abdomen is soft.      Tenderness: There is no abdominal tenderness.   Musculoskeletal:         General: No tenderness. Normal range of motion.      Cervical back: Normal range of motion and neck supple.      Right lower leg: Edema present.      Left lower leg: Edema present.      Comments: Anasarca noted up to pelvis/abdomen  Feet/lower extremity edema improving but still with pitting edema extending up to abdomen   Skin:     General: Skin is warm and dry.      Findings: Lesion and rash present.      Comments: See media for pictures from previous admission   1. Gluteal cleft/buttocks -denuded and excoriated  2. Right buttock shingles lesions- no vesicles on right buttock and in different healing stage.   3. Right lateral side just below axillary small blisters less then eraser pencil size  4. Right foot ventral aspect skin tear without drainage at this time.      All stable/healing   Neurological:      Mental Status: He is alert and oriented to person, place, and time.   Psychiatric:         Thought Content: Thought content normal.              Significant Labs: All pertinent labs within the past 24 hours have been reviewed.    Significant Imaging: I have reviewed all pertinent imaging results/findings within the past 24 hours.        Assessment/Plan:      * acute on chronic diastolic heart failure  -Admitted to inpatient status  -On admit had severe edema / anasarca - BLE up to hips and abdomen with edema inhibiting ambulation.  -Noted recent 2 D echo in June HFpEF.   -Strict ins/outs and daily weights  -Slowly improving, but still with significant deep edema in legs  -Continue with increased dosing of iv lasix - if clinically looks similar tomorrow will increase to TID dosing.    Anasarca  -Due to HFpEF, CKD, malnutrition, COVID 19 infection and immobility- addressing these issues separately.     CKD (chronic kidney disease), stage IV  Baseline Cr 2.7-3.0.   -Cr now 2.1  -Remains fluid overloaded with edema  -Avoid nephrotoxic agents and renally dose meds  -Diurese as above  -Repeat BMP in AM    COVID-19 virus infection  -Initially asymptomatic now with non productive cough, 7/29 night 89% improve with O2 2 LNC  -CXR showed increase opacities and small pleural effusions  -Treated with dexamethasone, remdsivir, prn tessalon, albuterol inhaler bid and incentive spirometry  -Stopped dexamethasone due to hyperglycemia and he has completed remdesivir  -Clinically his covid infection has resolved and he is breathing comfortably on room air and symptom free  -Continue in covid isolation    Alcohol abuse  -Patient has a history of alcohol abuse however denies any recent used in the past week.    -No signs of DT this admission     Effusion of left knee  -X-ray on 07/17 showed significant suprapatellar effusion.  Previous admission in June orthopedic surgery attempted aspiration but no fluid then injected steroid with improvement in pain  -Uric acid remains elevated at 10.5, but notably improved from prior measurements this year  -No colchicine secondary to  CKD  -Consulted orthopedic surgery and input appreciated - no intervention at this time  -Today no complaints of pain.  -Add allopurinol 8/3    Multiple wounds  1. Gluteal cleft - excoriation- Wound care consult from previous admission few days ago-   2. Right buttock lesion from shingles healing at different stages - no vesicles  3. Right foot ventral aspect skin tear- clean with vashe and keep clean JASON  4. Right lateral back below armput few less then eraser size blister. - keep clean dry  -Wound care consulted and input appreciated  -Cleanse with vashe and put hydrogel to right buttock lesions from shingles     Debility Due to left knee pain and effusion due to gout? and/or injury  -Prior to admission patient used a roller walker for ambulation at baseline.  -On admit patient unable to ambulate even with walker due to swelling of his legs and left knee pain  -Xray of knee showed no acute findings  -orthopedic surgery consulted and input appreciated.  Ortho note 7/30 reviewed - report he does not ambulate and pain is chronic - he uses wheelchair and bed.  Noted L knee effusion felt secondary to arthritis and no evidence of infection or need for aspiration.  -PT/OT consulted and input appreciated.  Recommended SNF which we attempted to get approved, but after peer to peer today that was denied by his insurance company - [employer].    -Will discuss with patient and his niece - may need to consider NH placement.    Severe malnutrition  -Added protein supplementations with meals    Paroxysmal atrial flutter  -Stable  -Continue amiodarone, metoprolol and eliquis    BPH (benign prostatic hyperplasia)  -Chronic, stable, continue Flomax    Anemia  -Hgb drop this admission and compared to last month hgb 10-11 range.   -No melena or hematochezia noted.  -3/2016 EGD normal esophagus and erythematous stomach and duodenum but no mention ulcers/erosions  -CKD IV also contributing   -Low iron and t stat elevated ferritin, normal  vitamin b 12, low folic acid  -Held eliquis initially but now resumed.  -Received 1 unit PRBC and added PPI.  -Started folic acid, ferrous sulfate  -Hb now up to 8.1    Type 2 diabetes mellitus with kidney complication, with long-term current use of insulin  -A1c 7.8  -Noted significant hyperglycemia earlier in stay   -Improved but down to 70s this morning - no symptoms  -Decrease detemir to 30 units qhs and aspart to 10 units tidwm    Dyslipidemia  -Chronic, stable, substitute for atorvastatin while in the hospital    Essential hypertension  -BP normal to mildly elevated  -Continue home antihypertensives-amlodipine, hydralazine, metoprolol with parameters  -Prn hydralazine SBP >180    VTE Risk Mitigation (From admission, onward)         Ordered     apixaban tablet 5 mg  2 times daily         08/01/23 1648     Place MASOOD hose  Until discontinued         07/28/23 0719     IP VTE HIGH RISK PATIENT  Once         07/27/23 2326     Place sequential compression device  Until discontinued         07/27/23 2326     Reason for No Pharmacological VTE Prophylaxis  Once        Question:  Reasons:  Answer:  Already adequately anticoagulated on oral Anticoagulants    07/27/23 2326                Discharge Planning   ELY: 8/3/2023     Code Status: Full Code   Is the patient medically ready for discharge?:     Reason for patient still in hospital (select all that apply): Treatment  Discharge Plan A: Skilled Nursing Facility   Discharge Delays: (!) Post-Acute Set-up              Martin Young MD  Department of Hospital Medicine   [facility]

## 2023-08-04 NOTE — ASSESSMENT & PLAN NOTE
-Hgb drop this admission and compared to last month hgb 10-11 range.   -No melena or hematochezia noted.  -3/2016 EGD normal esophagus and erythematous stomach and duodenum but no mention ulcers/erosions  -CKD IV also contributing   -Low iron and t stat elevated ferritin, normal vitamin b 12, low folic acid  -Held eliquis initially but now resumed.  -Received 1 unit PRBC and added PPI.  -Started folic acid, ferrous sulfate  -Hb now up to 8.1

## 2023-08-04 NOTE — SUBJECTIVE & OBJECTIVE
Interval History: No acute events overnight.  Denies pain.  States edema is improving, but still with deep 2+ edema in legs.  All questions answered and patient had no further complaints.      Objective:     Vital Signs (Most Recent):  Temp: 98.1 °F (36.7 °C) (08/03/23 1714)  Pulse: 60 (08/03/23 1714)  Resp: 20 (08/03/23 1714)  BP: (!) 149/67 (08/03/23 1714)  SpO2: 97 % (08/03/23 1714) Vital Signs (24h Range):  Temp:  [97.4 °F (36.3 °C)-98.6 °F (37 °C)] 98.1 °F (36.7 °C)  Pulse:  [60-68] 60  Resp:  [17-20] 20  SpO2:  [96 %-99 %] 97 %  BP: (139-168)/(64-71) 149/67     Weight: 98.9 kg (218 lb)  Body mass index is 30.4 kg/m².    Intake/Output Summary (Last 24 hours) at 8/3/2023 1914  Last data filed at 8/3/2023 1733  Gross per 24 hour   Intake 580 ml   Output 2600 ml   Net -2020 ml           Physical Exam  Vitals and nursing note reviewed.   Constitutional:       General: He is not in acute distress.     Appearance: He is well-developed. He is obese.   Cardiovascular:      Rate and Rhythm: Normal rate.   Pulmonary:      Effort: Pulmonary effort is normal. No respiratory distress.      Breath sounds: Normal breath sounds. No wheezing or rales.   Abdominal:      General: Bowel sounds are normal. There is no distension.      Palpations: Abdomen is soft.      Tenderness: There is no abdominal tenderness.   Musculoskeletal:         General: No tenderness. Normal range of motion.      Cervical back: Normal range of motion and neck supple.      Right lower leg: Edema present.      Left lower leg: Edema present.      Comments: Anasarca noted up to pelvis/abdomen  Feet/lower extremity edema improving but still with pitting edema extending up to abdomen   Skin:     General: Skin is warm and dry.      Findings: Lesion and rash present.      Comments: See media for pictures from previous admission   1. Gluteal cleft/buttocks -denuded and excoriated  2. Right buttock shingles lesions- no vesicles on right buttock and in different  healing stage.   3. Right lateral side just below axillary small blisters less then eraser pencil size  4. Right foot ventral aspect skin tear without drainage at this time.      All stable/healing   Neurological:      Mental Status: He is alert and oriented to person, place, and time.   Psychiatric:         Thought Content: Thought content normal.             Significant Labs: All pertinent labs within the past 24 hours have been reviewed.    Significant Imaging: I have reviewed all pertinent imaging results/findings within the past 24 hours.

## 2023-08-04 NOTE — ASSESSMENT & PLAN NOTE
-Initially asymptomatic now with non productive cough, 7/29 night 89% improve with O2 2 LNC  -CXR showed increase opacities and small pleural effusions  -Treated with dexamethasone, remdsivir, prn tessalon, albuterol inhaler bid and incentive spirometry  -Stopped dexamethasone due to hyperglycemia and he has completed remdesivir  -Clinically his covid infection has resolved and he is breathing comfortably on room air and symptom free  -Continue in covid isolation

## 2023-08-04 NOTE — ASSESSMENT & PLAN NOTE
-X-ray on 07/17 showed significant suprapatellar effusion.  Previous admission in June orthopedic surgery attempted aspiration but no fluid then injected steroid with improvement in pain  -Uric acid remains elevated at 10.5, but notably improved from prior measurements this year  -No colchicine secondary to CKD  -Consulted orthopedic surgery and input appreciated - no intervention at this time  -Today no complaints of pain.  -Add allopurinol 8/3

## 2023-08-04 NOTE — ASSESSMENT & PLAN NOTE
-Patient has a history of alcohol abuse however denies any recent used in the past week.    -No signs of DT this admission

## 2023-08-04 NOTE — ASSESSMENT & PLAN NOTE
-Prior to admission patient used a roller walker for ambulation at baseline.  -On admit patient unable to ambulate even with walker due to swelling of his legs and left knee pain  -Xray of knee showed no acute findings  -orthopedic surgery consulted and input appreciated.  Ortho note 7/30 reviewed - report he does not ambulate and pain is chronic - he uses wheelchair and bed.  Noted L knee effusion felt secondary to arthritis and no evidence of infection or need for aspiration.  -PT/OT consulted and input appreciated.  Plan SNF at discharge

## 2023-08-04 NOTE — ASSESSMENT & PLAN NOTE
-X-ray on 07/17 showed significant suprapatellar effusion.  Previous admission in June orthopedic surgery attempted aspiration but no fluid then injected steroid with improvement in pain  -Uric acid remains elevated at 10.5, but notably improved from prior measurements this year  -No colchicine secondary to CKD  -Consulted orthopedic surgery and input appreciated - no intervention at this time  -Today no complaints of pain.  -Will add allopurinol

## 2023-08-04 NOTE — ASSESSMENT & PLAN NOTE
-A1c 7.8  -Noted significant hyperglycemia earlier in stay which is now improving  -Continue detemir 50 units qhs and aspart 12 units tidwm

## 2023-08-04 NOTE — SUBJECTIVE & OBJECTIVE
Interval History: No acute events overnight.  In bedside chair and denies pain.  States edema is improving, but still with deep 2+ edema in legs.  All questions answered and patient had no further complaints.      Objective:     Vital Signs (Most Recent):  Temp: 98.6 °F (37 °C) (08/04/23 1701)  Pulse: 62 (08/04/23 1701)  Resp: 18 (08/04/23 1701)  BP: 135/60 (08/04/23 1701)  SpO2: 96 % (08/04/23 1701) Vital Signs (24h Range):  Temp:  [97.8 °F (36.6 °C)-98.8 °F (37.1 °C)] 98.6 °F (37 °C)  Pulse:  [57-64] 62  Resp:  [18-20] 18  SpO2:  [95 %-100 %] 96 %  BP: (120-142)/(57-67) 135/60     Weight: 98.9 kg (218 lb)  Body mass index is 30.4 kg/m².    Intake/Output Summary (Last 24 hours) at 8/4/2023 1833  Last data filed at 8/4/2023 1540  Gross per 24 hour   Intake 600 ml   Output 1625 ml   Net -1025 ml           Physical Exam  Vitals and nursing note reviewed.   Constitutional:       General: He is not in acute distress.     Appearance: He is well-developed. He is obese.   Cardiovascular:      Rate and Rhythm: Normal rate.   Pulmonary:      Effort: Pulmonary effort is normal. No respiratory distress.      Breath sounds: Normal breath sounds. No wheezing or rales.   Abdominal:      General: Bowel sounds are normal. There is no distension.      Palpations: Abdomen is soft.      Tenderness: There is no abdominal tenderness.   Musculoskeletal:         General: No tenderness. Normal range of motion.      Cervical back: Normal range of motion and neck supple.      Right lower leg: Edema present.      Left lower leg: Edema present.      Comments: Anasarca noted up to pelvis/abdomen  Feet/lower extremity edema improving but still with pitting edema extending up to abdomen   Skin:     General: Skin is warm and dry.      Findings: Lesion and rash present.      Comments: See media for pictures from previous admission   1. Gluteal cleft/buttocks -denuded and excoriated  2. Right buttock shingles lesions- no vesicles on right buttock  and in different healing stage.   3. Right lateral side just below axillary small blisters less then eraser pencil size  4. Right foot ventral aspect skin tear without drainage at this time.      All stable/healing   Neurological:      Mental Status: He is alert and oriented to person, place, and time.   Psychiatric:         Thought Content: Thought content normal.             Significant Labs: All pertinent labs within the past 24 hours have been reviewed.    Significant Imaging: I have reviewed all pertinent imaging results/findings within the past 24 hours.

## 2023-08-04 NOTE — PROGRESS NOTES
[facility] Medicine  Progress Note    Patient Name: Chato Bowie  MRN: [id number]  Patient Class: IP- Inpatient   Admission Date: 7/27/2023  Length of Stay: 4 days  Attending Physician: Martin Young MD  Primary Care Provider: Irlanda Valenzuela -        Subjective:     Principal Problem:Chronic diastolic heart failure        HPI:  Chato Bowie 72 y.o. male with CHF, CAD, DM, HTN, HLD, presents to the hospital with a chief complaint of BLE swelling and pain.  onset several weeks ago per chart review, patient was recently discharged from the ED yesterday for similar complaints, and was discharged from inpatient status on 07/25/2023. the patient had endorsed no relief to his complaints since discharge. Patient currently reports complaints of BLE swelling with associated pain. He states his complaints have been ongoing for a few weeks, but had worsened as of recent. He additionally reports he last had similar complaints a month ago, noting he had to be admitted for a couple of nights. He endorses compliance with antihypertensives and diuretics. He reports following with his PCP at [facility], but reports he does not know who is his cardiologist. Denies fever, shortness of breath, or cough.  Patient's niece states that the patient does not live in assisted living however he does need it as she can not provide care for him especially due to him being COVID positive.  No other alleviating or exacerbating factors noted.    In the ED patient was hypertensive (181/140) afebrile without leukocytosis, mild normocytic anemia noted, BUN 46, creatinine 2.7, EGFR 24, glucose 136, albumin 2.3, , TSH WNL, COVID positive, flu negative, UA showed +1 protein, +2 occult blood, CXR was negative for acute process.  Patient was placed in observation      Overview/Hospital Course:  Admission to hospital for BLE edema up hips due to acute on chronic diastolic heart failure and malnutrition. Patient also found  positive COVID 19 -asymptomatic on admission so Remdesivir discontinued. Edema improving with IV diuresis.  Became hypoxic night 7/29 requiring 2 L NC. Started Remdesivir/decadron (7/30), repeat CXR. Hypoglycemia (7/29) improved with oral intake and holding insulin.   He also have left  knee pain and edema similar to previous admission in June where Orthopedic surgery suspected gouty arthritis and attempted aspiration but only able to give steroid injection with improvement. No colchicine due to CKD. Left knee x ray on 7/17/23 showed significant suprapatellar effusion. Due to pain and decrease mobility, will ask Orthopedic Surgery to evaluate.  Hold apixaban  (last dose 7/29 2221).   Hgb treaded down 8.2>7.5>7.1>6.5 without over signs of bleeding. Had normal BM on 7/29 no melena or hematochezia. Previous iron studies low iron/t stat and elevated ferritin, normal vitamin b 12, and low folic acid. Start folic acid, ferrous sulfate and transfuse 1 unit of PRBC.   Recent admission 7/17-7/25 for sepsis due to shingles/cellulitis and ELIZABETH. Right buttock lesion from shingles are all in different part of healing phase without drainage. Also have gluteal cleft excoriation. Continue wound care as recommended by wound care team on previous admission-Cleanse both area with vashe, put mepilex to gluteal cleft, and put hydrogel to shingle lesion.   Also have right upper lateral back below axillary unroof blister and right foot ventral aspect skin tear. Keep clean and dry.     PT/OT evaluation completed. TN/SW to assist with SNF.               Interval History: No acute events overnight.  Denies pain.  States edema is improving, but still with deep 2+ edema in legs.  All questions answered and patient had no further complaints.      Objective:     Vital Signs (Most Recent):  Temp: 98.1 °F (36.7 °C) (08/03/23 1714)  Pulse: 60 (08/03/23 1714)  Resp: 20 (08/03/23 1714)  BP: (!) 149/67 (08/03/23 1714)  SpO2: 97 % (08/03/23 1714) Vital  Signs (24h Range):  Temp:  [97.4 °F (36.3 °C)-98.6 °F (37 °C)] 98.1 °F (36.7 °C)  Pulse:  [60-68] 60  Resp:  [17-20] 20  SpO2:  [96 %-99 %] 97 %  BP: (139-168)/(64-71) 149/67     Weight: 98.9 kg (218 lb)  Body mass index is 30.4 kg/m².    Intake/Output Summary (Last 24 hours) at 8/3/2023 1914  Last data filed at 8/3/2023 1733  Gross per 24 hour   Intake 580 ml   Output 2600 ml   Net -2020 ml           Physical Exam  Vitals and nursing note reviewed.   Constitutional:       General: He is not in acute distress.     Appearance: He is well-developed. He is obese.   Cardiovascular:      Rate and Rhythm: Normal rate.   Pulmonary:      Effort: Pulmonary effort is normal. No respiratory distress.      Breath sounds: Normal breath sounds. No wheezing or rales.   Abdominal:      General: Bowel sounds are normal. There is no distension.      Palpations: Abdomen is soft.      Tenderness: There is no abdominal tenderness.   Musculoskeletal:         General: No tenderness. Normal range of motion.      Cervical back: Normal range of motion and neck supple.      Right lower leg: Edema present.      Left lower leg: Edema present.      Comments: Anasarca noted up to pelvis/abdomen  Feet/lower extremity edema improving but still with pitting edema extending up to abdomen   Skin:     General: Skin is warm and dry.      Findings: Lesion and rash present.      Comments: See media for pictures from previous admission   1. Gluteal cleft/buttocks -denuded and excoriated  2. Right buttock shingles lesions- no vesicles on right buttock and in different healing stage.   3. Right lateral side just below axillary small blisters less then eraser pencil size  4. Right foot ventral aspect skin tear without drainage at this time.      All stable/healing   Neurological:      Mental Status: He is alert and oriented to person, place, and time.   Psychiatric:         Thought Content: Thought content normal.             Significant Labs: All pertinent  labs within the past 24 hours have been reviewed.    Significant Imaging: I have reviewed all pertinent imaging results/findings within the past 24 hours.      Assessment/Plan:      * acute on chronic diastolic heart failure  -Admitted to inpatient status  -On admit had severe edema / anasarca - BLE up to hips and abdomen with edema inhibiting ambulation.  -Noted recent 2 D echo in June HFpEF.   -Strict ins/outs and daily weights  -Continue IV diuresis but increase lasix to 60mg q12h - still with significant deep pitting lower extremity edema    Anasarca  -Due to HFpEF, CKD, malnutrition, COVID 19 infection and immobility- addressing these issues separately.     CKD (chronic kidney disease), stage IV  Baseline Cr 2.7-3.0.   -Cr now 2.2  -Remains fluid overloaded with edema  -Avoid nephrotoxic agents and renally dose meds  -diurese as above  -Repeat BMP in AM    COVID-19 virus infection  -Initially asymptomatic now with non productive cough, 7/29 night 89% improve with O2 2 LNC  -CXR showed increase opacities and small pleural effusions  -Treated with dexamethasone, remdsivir, prn tessalon, albuterol inhaler bid and incentive spirometry  -Stopped dexamethasone due to hyperglycemia and he clinically improved  -Now on room air and symptom free  -Continue in covid isolation  -Complete remdesivir course    Alcohol abuse  -Patient has a history of alcohol abuse however denies any recent used in the past week.    -No signs of DT this admission     Effusion of left knee  -X-ray on 07/17 showed significant suprapatellar effusion.  Previous admission in June orthopedic surgery attempted aspiration but no fluid then injected steroid with improvement in pain  -Uric acid remains elevated at 10.5, but notably improved from prior measurements this year  -No colchicine secondary to CKD  -Consulted orthopedic surgery and input appreciated - no intervention at this time  -Today no complaints of pain.  -Will add  allopurinol    Multiple wounds  1. Gluteal cleft - excoriation- Wound care consult from previous admission few days ago-   2. Right buttock lesion from shingles healing at different stages - no vesicles  3. Right foot ventral aspect skin tear- clean with vashe and keep clean JASON  4. Right lateral back below armput few less then eraser size blister. - keep clean dry  -Wound care consulted and input appreciated  -Cleanse with vashe and put hydrogel to right buttock lesions from shingles     Anemia in other chronic diseases classified elsewhere        Debility Due to left knee pain and effusion due to gout? and/or injury  -Prior to admission patient used a roller walker for ambulation at baseline.  -On admit patient unable to ambulate even with walker due to swelling of his legs and left knee pain  -Xray of knee showed no acute findings  -orthopedic surgery consulted and input appreciated.  Ortho note 7/30 reviewed - report he does not ambulate and pain is chronic - he uses wheelchair and bed.  Noted L knee effusion felt secondary to arthritis and no evidence of infection or need for aspiration.  -PT/OT consulted and input appreciated.  Plan SNF at discharge    Severe malnutrition  -Add protein supplementations with meals    Paroxysmal atrial flutter  -Stable  -Continue amiodarone, metoprolol and eliquis    BPH (benign prostatic hyperplasia)  -Chronic, stable, continue Flomax    Anemia  -Hgb drop this admission and compared to last month hgb 10-11 range.   -No melena or hematochezia noted.  -3/2016 EGD normal esophagus and erythematous stomach and duodenum but no mention ulcers/erosions  -CKD IV also contributing   -Low iron and t stat elevated ferritin, normal vitamin b 12, low folic acid  -Held eliquis initially but now resumed.  -Received 1 unit PRBC and added PPI.  -Started folic acid, ferrous sulfate  -No cbc since 7/31 - will repeat in AM.    Type 2 diabetes mellitus with kidney complication, with long-term  current use of insulin  -A1c 7.8  -Noted significant hyperglycemia earlier in stay which is now improving  -Continue detemir 50 units qhs and aspart 12 units tidwm    Dyslipidemia  -Chronic, stable, substitute for atorvastatin while in the hospital    Essential hypertension  -BP normal to mildly elevated  -Continue home antihypertensives-amlodipine, hydralazine, metoprolol with parameters  -Prn hydralazine SBP >180      VTE Risk Mitigation (From admission, onward)         Ordered     apixaban tablet 5 mg  2 times daily         08/01/23 1648     Place MASOOD hose  Until discontinued         07/28/23 0719     IP VTE HIGH RISK PATIENT  Once         07/27/23 2326     Place sequential compression device  Until discontinued         07/27/23 2326     Reason for No Pharmacological VTE Prophylaxis  Once        Question:  Reasons:  Answer:  Already adequately anticoagulated on oral Anticoagulants    07/27/23 2326                Discharge Planning   ELY: 8/3/2023     Code Status: Full Code   Is the patient medically ready for discharge?:     Reason for patient still in hospital (select all that apply): Treatment  Discharge Plan A: Skilled Nursing Facility   Discharge Delays: (!) Post-Acute Set-up              Martin Young MD  Department of Hospital Medicine   [facility]

## 2023-08-04 NOTE — ASSESSMENT & PLAN NOTE
1. Gluteal cleft - excoriation- Wound care consult from previous admission few days ago-   2. Right buttock lesion from shingles healing at different stages - no vesicles  3. Right foot ventral aspect skin tear- clean with vashe and keep clean JASON  4. Right lateral back below armput few less then eraser size blister. - keep clean dry  -Wound care consulted and input appreciated  -Cleanse with vashe and put hydrogel to right buttock lesions from shingles

## 2023-08-04 NOTE — PT/OT/SLP PROGRESS
Physical Therapy Treatment    Patient Name:  Chato Bowie   MRN:  [id number]    Recommendations:     Discharge Recommendations: nursing facility, skilled  Discharge Equipment Recommendations: none  Barriers to discharge: None    Assessment:     Chato Bowie is a 72 y.o. male admitted with a medical diagnosis of Chronic diastolic heart failure.  He presents with the following impairments/functional limitations: weakness, impaired endurance, impaired self care skills, impaired functional mobility, gait instability, pain, impaired balance, decreased safety awareness, decreased lower extremity function, decreased upper extremity function, decreased coordination, decreased ROM, edema, impaired skin .    Rehab Prognosis: Good; patient would benefit from acute skilled PT services to address these deficits and reach maximum level of function.    Recent Surgery: * No surgery found *      Plan:     During this hospitalization, patient to be seen 6 x/week to address the identified rehab impairments via gait training, therapeutic activities, therapeutic exercises, neuromuscular re-education, wheelchair management/training and progress toward the following goals:    Plan of Care Expires:  08/04/23    Subjective     Chief Complaint: tired and sleepy   Patient/Family Comments/goals: pt is very pleasant and motivated to participate in therapy treatment .   Pain/Comfort:  Pain Rating 1: 8/10  Location - Side 1: Right  Location 1: knee  Pain Addressed 1: Pre-medicate for activity, Reposition, Cessation of Activity  Pain Rating Post-Intervention 1: 8/10      Objective:     Communicated with nurse prior to session.  Patient found up in reclined chair with telemetry, oxygen, peripheral IV upon PT entry to room.     General Precautions: Standard, fall, respiratory, droplet, contact, airborne  Orthopedic Precautions: N/A  Braces: N/A  Respiratory Status: Room air   SpO2 92% -95% on RA   Functional Mobility:  Bed Mobility:     Rolling  Right: contact guard assistance  Scooting: contact guard assistance  Sit to Supine: contact guard assistance / minimum assistance, bedside rail   Transfers:     Sit to Stand: from bed  minimum assistance and moderate assistance from chair with rolling walker. V/T cues for safety technique and walker management.   Bed to Chair: contact guard assistance with  rolling walker  using  Step Transfer  Gait: pt ambulated 8 ft and 16 ft with RW, CGA . Noted with decreased fanta,decreased step length, decreased weight shifting ability , no LOB. Limited with further gait training 2* to R knee pain . V/T cues for safety, pursed lip breathing technique and walker management.    Balance: good in sitting, fair in standing.        AM-PAC 6 CLICK MOBILITY  Turning over in bed (including adjusting bedclothes, sheets and blankets)?: 4  Sitting down on and standing up from a chair with arms (e.g., wheelchair, bedside commode, etc.): 2  Moving from lying on back to sitting on the side of the bed?: 3  Moving to and from a bed to a chair (including a wheelchair)?: 3  Need to walk in hospital room?: 3  Climbing 3-5 steps with a railing?: 2  Basic Mobility Total Score: 17       Treatment & Education:  Lower Extremity Exercises.   Patient educated on the purpose of therapeutic exercise.    Patient verbalized acceptance/understanding of instructions, expectations, and limitations(for safety).  Performed BLE x 10 reps x 2 : AP, QS in reclined chair.   Patient performed: 2 sets of 10 reps (each) of B LE There Ex: AP, LAQ, Hip abd/add while sitting up on chair .       Patient required rest breaks, pursed lip breathing technique,  verbal cues/tactile cues to ensure correct sequence, to maintain proper form, and to allow for self-correction.    Patient left HOB elevated with pillow to offload L side, BLE elevated, heels offloading all lines intact, call button in reach, bed alarm on, and nurse Qing  notified..    GOALS:   Multidisciplinary  Problems       Physical Therapy Goals          Problem: Physical Therapy    Goal Priority Disciplines Outcome Goal Variances Interventions   Physical Therapy Goal     PT, PT/OT Ongoing, Progressing     Description: Goals to be met by: 23     Patient will increase functional independence with mobility by performin. Pt to be min A with bed mobility.  2. Pt to transfer with min A.  3. Pt to be able to tolerate standing with RW EOB; Pt to ambulate 15' w/RW min A.  4. Pt to be (I) with written HEP.                         Time Tracking:     PT Received On: 23  PT Start Time: 1625     PT Stop Time: 1648  PT Total Time (min): 23 min     Billable Minutes: Gait Training 9 and Therapeutic Exercise 14    Treatment Type: Treatment  PT/PTA: PTA     Number of PTA visits since last PT visit: 2023

## 2023-08-05 LAB
ANION GAP SERPL CALC-SCNC: 10 MMOL/L (ref 8–16)
BASOPHILS # BLD AUTO: 0.02 K/UL (ref 0–0.2)
BASOPHILS NFR BLD: 0.2 % (ref 0–1.9)
BILIRUB UR QL STRIP: NEGATIVE
BUN SERPL-MCNC: 75 MG/DL (ref 8–23)
CALCIUM SERPL-MCNC: 8.2 MG/DL (ref 8.7–10.5)
CHLORIDE SERPL-SCNC: 103 MMOL/L (ref 95–110)
CLARITY UR: CLEAR
CO2 SERPL-SCNC: 24 MMOL/L (ref 23–29)
COLOR UR: YELLOW
CREAT SERPL-MCNC: 2 MG/DL (ref 0.5–1.4)
DIFFERENTIAL METHOD: ABNORMAL
EOSINOPHIL # BLD AUTO: 0.1 K/UL (ref 0–0.5)
EOSINOPHIL NFR BLD: 1.4 % (ref 0–8)
ERYTHROCYTE [DISTWIDTH] IN BLOOD BY AUTOMATED COUNT: 16.2 % (ref 11.5–14.5)
EST. GFR  (NO RACE VARIABLE): 35 ML/MIN/1.73 M^2
GLUCOSE SERPL-MCNC: 54 MG/DL (ref 70–110)
GLUCOSE UR QL STRIP: NEGATIVE
HCT VFR BLD AUTO: 25.2 % (ref 40–54)
HGB BLD-MCNC: 8.1 G/DL (ref 14–18)
HGB UR QL STRIP: NEGATIVE
IMM GRANULOCYTES # BLD AUTO: 0.4 K/UL (ref 0–0.04)
IMM GRANULOCYTES NFR BLD AUTO: 4.6 % (ref 0–0.5)
KETONES UR QL STRIP: NEGATIVE
LACTATE SERPL-SCNC: 0.8 MMOL/L (ref 0.5–2.2)
LEUKOCYTE ESTERASE UR QL STRIP: NEGATIVE
LYMPHOCYTES # BLD AUTO: 0.9 K/UL (ref 1–4.8)
LYMPHOCYTES NFR BLD: 10.6 % (ref 18–48)
MAGNESIUM SERPL-MCNC: 1.7 MG/DL (ref 1.6–2.6)
MCH RBC QN AUTO: 27.8 PG (ref 27–31)
MCHC RBC AUTO-ENTMCNC: 32.1 G/DL (ref 32–36)
MCV RBC AUTO: 87 FL (ref 82–98)
MONOCYTES # BLD AUTO: 1.5 K/UL (ref 0.3–1)
MONOCYTES NFR BLD: 17.4 % (ref 4–15)
NEUTROPHILS # BLD AUTO: 5.7 K/UL (ref 1.8–7.7)
NEUTROPHILS NFR BLD: 65.8 % (ref 38–73)
NITRITE UR QL STRIP: NEGATIVE
NRBC BLD-RTO: 1 /100 WBC
PH UR STRIP: 5 [PH] (ref 5–8)
PLATELET # BLD AUTO: 318 K/UL (ref 150–450)
PMV BLD AUTO: 8.8 FL (ref 9.2–12.9)
POCT GLUCOSE: 115 MG/DL (ref 70–110)
POCT GLUCOSE: 141 MG/DL (ref 70–110)
POCT GLUCOSE: 192 MG/DL (ref 70–110)
POCT GLUCOSE: 219 MG/DL (ref 70–110)
POCT GLUCOSE: 49 MG/DL (ref 70–110)
POCT GLUCOSE: 60 MG/DL (ref 70–110)
POCT GLUCOSE: 84 MG/DL (ref 70–110)
POTASSIUM SERPL-SCNC: 3.5 MMOL/L (ref 3.5–5.1)
PROCALCITONIN SERPL IA-MCNC: 0.78 NG/ML
PROT UR QL STRIP: NEGATIVE
RBC # BLD AUTO: 2.91 M/UL (ref 4.6–6.2)
SODIUM SERPL-SCNC: 137 MMOL/L (ref 136–145)
SP GR UR STRIP: 1.01 (ref 1–1.03)
URN SPEC COLLECT METH UR: NORMAL
UROBILINOGEN UR STRIP-ACNC: NEGATIVE EU/DL
WBC # BLD AUTO: 8.68 K/UL (ref 3.9–12.7)

## 2023-08-05 PROCEDURE — 25000003 PHARM REV CODE 250: Performed by: NURSE PRACTITIONER

## 2023-08-05 PROCEDURE — 27000207 HC ISOLATION

## 2023-08-05 PROCEDURE — 81003 URINALYSIS AUTO W/O SCOPE: CPT | Performed by: HOSPITALIST

## 2023-08-05 PROCEDURE — 25000003 PHARM REV CODE 250: Performed by: HOSPITALIST

## 2023-08-05 PROCEDURE — 80048 BASIC METABOLIC PNL TOTAL CA: CPT | Performed by: STUDENT IN AN ORGANIZED HEALTH CARE EDUCATION/TRAINING PROGRAM

## 2023-08-05 PROCEDURE — 36415 COLL VENOUS BLD VENIPUNCTURE: CPT | Performed by: STUDENT IN AN ORGANIZED HEALTH CARE EDUCATION/TRAINING PROGRAM

## 2023-08-05 PROCEDURE — 84145 PROCALCITONIN (PCT): CPT | Performed by: HOSPITALIST

## 2023-08-05 PROCEDURE — 63600175 PHARM REV CODE 636 W HCPCS: Performed by: HOSPITALIST

## 2023-08-05 PROCEDURE — 83605 ASSAY OF LACTIC ACID: CPT | Performed by: HOSPITALIST

## 2023-08-05 PROCEDURE — 25000003 PHARM REV CODE 250: Performed by: STUDENT IN AN ORGANIZED HEALTH CARE EDUCATION/TRAINING PROGRAM

## 2023-08-05 PROCEDURE — 11000001 HC ACUTE MED/SURG PRIVATE ROOM

## 2023-08-05 PROCEDURE — 25000003 PHARM REV CODE 250

## 2023-08-05 PROCEDURE — 36415 COLL VENOUS BLD VENIPUNCTURE: CPT | Performed by: HOSPITALIST

## 2023-08-05 PROCEDURE — 85025 COMPLETE CBC W/AUTO DIFF WBC: CPT | Performed by: HOSPITALIST

## 2023-08-05 PROCEDURE — 83735 ASSAY OF MAGNESIUM: CPT | Performed by: HOSPITALIST

## 2023-08-05 RX ORDER — MAGNESIUM SULFATE 1 G/100ML
1 INJECTION INTRAVENOUS ONCE
Status: COMPLETED | OUTPATIENT
Start: 2023-08-05 | End: 2023-08-05

## 2023-08-05 RX ORDER — INSULIN ASPART 100 [IU]/ML
5 INJECTION, SOLUTION INTRAVENOUS; SUBCUTANEOUS
Status: DISCONTINUED | OUTPATIENT
Start: 2023-08-06 | End: 2023-08-07

## 2023-08-05 RX ORDER — POTASSIUM CHLORIDE 750 MG/1
30 TABLET, EXTENDED RELEASE ORAL ONCE
Status: COMPLETED | OUTPATIENT
Start: 2023-08-05 | End: 2023-08-05

## 2023-08-05 RX ORDER — FUROSEMIDE 10 MG/ML
60 INJECTION INTRAMUSCULAR; INTRAVENOUS EVERY 8 HOURS
Status: DISCONTINUED | OUTPATIENT
Start: 2023-08-05 | End: 2023-08-10

## 2023-08-05 RX ADMIN — AMIODARONE HYDROCHLORIDE 200 MG: 200 TABLET ORAL at 08:08

## 2023-08-05 RX ADMIN — AMLODIPINE BESYLATE 10 MG: 5 TABLET ORAL at 08:08

## 2023-08-05 RX ADMIN — OXYCODONE HYDROCHLORIDE AND ACETAMINOPHEN 500 MG: 500 TABLET ORAL at 08:08

## 2023-08-05 RX ADMIN — CAPSAICIN: 0.25 CREAM TOPICAL at 10:08

## 2023-08-05 RX ADMIN — THERA TABS 1 TABLET: TAB at 08:08

## 2023-08-05 RX ADMIN — GABAPENTIN 100 MG: 100 CAPSULE ORAL at 08:08

## 2023-08-05 RX ADMIN — ALLOPURINOL 100 MG: 100 TABLET ORAL at 08:08

## 2023-08-05 RX ADMIN — LIDOCAINE 5% 1 PATCH: 700 PATCH TOPICAL at 05:08

## 2023-08-05 RX ADMIN — INSULIN ASPART 2 UNITS: 100 INJECTION, SOLUTION INTRAVENOUS; SUBCUTANEOUS at 05:08

## 2023-08-05 RX ADMIN — CAPSAICIN: 0.25 CREAM TOPICAL at 08:08

## 2023-08-05 RX ADMIN — FOLIC ACID 1 MG: 1 TABLET ORAL at 08:08

## 2023-08-05 RX ADMIN — MAGNESIUM SULFATE 1 G: 1 INJECTION INTRAVENOUS at 10:08

## 2023-08-05 RX ADMIN — APIXABAN 5 MG: 5 TABLET, FILM COATED ORAL at 08:08

## 2023-08-05 RX ADMIN — POTASSIUM CHLORIDE 30 MEQ: 750 TABLET, EXTENDED RELEASE ORAL at 10:08

## 2023-08-05 RX ADMIN — FUROSEMIDE 60 MG: 10 INJECTION, SOLUTION INTRAMUSCULAR; INTRAVENOUS at 08:08

## 2023-08-05 RX ADMIN — INSULIN ASPART 10 UNITS: 100 INJECTION, SOLUTION INTRAVENOUS; SUBCUTANEOUS at 05:08

## 2023-08-05 RX ADMIN — HYDROCODONE BITARTRATE AND ACETAMINOPHEN 1 TABLET: 5; 325 TABLET ORAL at 06:08

## 2023-08-05 RX ADMIN — INSULIN ASPART 10 UNITS: 100 INJECTION, SOLUTION INTRAVENOUS; SUBCUTANEOUS at 12:08

## 2023-08-05 RX ADMIN — Medication 16 G: at 07:08

## 2023-08-05 RX ADMIN — METOPROLOL SUCCINATE 25 MG: 25 TABLET, EXTENDED RELEASE ORAL at 08:08

## 2023-08-05 RX ADMIN — TAMSULOSIN HYDROCHLORIDE 0.4 MG: 0.4 CAPSULE ORAL at 08:08

## 2023-08-05 RX ADMIN — ATORVASTATIN CALCIUM 80 MG: 40 TABLET, FILM COATED ORAL at 08:08

## 2023-08-05 RX ADMIN — FUROSEMIDE 60 MG: 10 INJECTION, SOLUTION INTRAMUSCULAR; INTRAVENOUS at 10:08

## 2023-08-05 RX ADMIN — MUPIROCIN: 20 OINTMENT TOPICAL at 08:08

## 2023-08-05 NOTE — NURSING
Pt resting in bed asleep. No complaints of pain/discomfort. IV saline lock. Tele #[id number]. Isolation precautions maintained. Scheduled medications given. Vitals assessed. Accu checks with insulin coverage. Safety measures maintained. Will cont to monitor

## 2023-08-05 NOTE — ASSESSMENT & PLAN NOTE
-Baseline Cr 2.7-3.0.  -Cr now 2.0  -Remains fluid overloaded with edema  -Avoid nephrotoxic agents and renally dose meds  -Diurese as above  -Repeat BMP in AM

## 2023-08-05 NOTE — SUBJECTIVE & OBJECTIVE
Interval History: No acute events overnight.  Noted isolated fever.  States he is feeling OK.  Legs still remain rather edematous.  All questions answered and patient had no further complaints.      Objective:     Vital Signs (Most Recent):  Temp: 98.3 °F (36.8 °C) (08/05/23 1605)  Pulse: 64 (08/05/23 1605)  Resp: 18 (08/05/23 1801)  BP: 133/62 (08/05/23 1605)  SpO2: (!) 92 % (08/05/23 1605) Vital Signs (24h Range):  Temp:  [98.1 °F (36.7 °C)-100.4 °F (38 °C)] 98.3 °F (36.8 °C)  Pulse:  [62-66] 64  Resp:  [18-24] 18  SpO2:  [92 %-98 %] 92 %  BP: (110-135)/(59-63) 133/62     Weight: 98.9 kg (218 lb)  Body mass index is 30.4 kg/m².    Intake/Output Summary (Last 24 hours) at 8/5/2023 1830  Last data filed at 8/5/2023 1813  Gross per 24 hour   Intake 720 ml   Output 500 ml   Net 220 ml           Physical Exam  Vitals and nursing note reviewed.   Constitutional:       General: He is not in acute distress.     Appearance: He is well-developed. He is obese.   Cardiovascular:      Rate and Rhythm: Normal rate.   Pulmonary:      Effort: Pulmonary effort is normal. No respiratory distress.      Breath sounds: Normal breath sounds. No wheezing or rales.   Abdominal:      General: Bowel sounds are normal. There is no distension.      Palpations: Abdomen is soft.      Tenderness: There is no abdominal tenderness.   Musculoskeletal:         General: No tenderness. Normal range of motion.      Cervical back: Normal range of motion and neck supple.      Right lower leg: Edema present.      Left lower leg: Edema present.      Comments: Anasarca noted up to pelvis/abdomen  Feet/lower extremity edema improving but still with pitting edema extending up to abdomen   Skin:     General: Skin is warm and dry.      Findings: Lesion and rash present.      Comments: See media for pictures from previous admission   1. Gluteal cleft/buttocks -denuded and excoriated  2. Right buttock shingles lesions- no vesicles on right buttock and in  different healing stage.   3. Right lateral side just below axillary small blisters less then eraser pencil size  4. Right foot ventral aspect skin tear without drainage at this time.      All stable/healing   Neurological:      Mental Status: He is alert and oriented to person, place, and time.   Psychiatric:         Thought Content: Thought content normal.             Significant Labs: All pertinent labs within the past 24 hours have been reviewed.    Significant Imaging: I have reviewed all pertinent imaging results/findings within the past 24 hours.

## 2023-08-05 NOTE — PROGRESS NOTES
[facility] Medicine  Progress Note    Patient Name: Chato Bowie  MRN: [id number]  Patient Class: IP- Inpatient   Admission Date: 7/27/2023  Length of Stay: 6 days  Attending Physician: Maritn Young MD  Primary Care Provider: Irlanda Valenzuela -        Subjective:     Principal Problem:Chronic diastolic heart failure        HPI:  Chato Bowie 72 y.o. male with CHF, CAD, DM, HTN, HLD, presents to the hospital with a chief complaint of BLE swelling and pain.  onset several weeks ago per chart review, patient was recently discharged from the ED yesterday for similar complaints, and was discharged from inpatient status on 07/25/2023. the patient had endorsed no relief to his complaints since discharge. Patient currently reports complaints of BLE swelling with associated pain. He states his complaints have been ongoing for a few weeks, but had worsened as of recent. He additionally reports he last had similar complaints a month ago, noting he had to be admitted for a couple of nights. He endorses compliance with antihypertensives and diuretics. He reports following with his PCP at [facility], but reports he does not know who is his cardiologist. Denies fever, shortness of breath, or cough.  Patient's niece states that the patient does not live in assisted living however he does need it as she can not provide care for him especially due to him being COVID positive.  No other alleviating or exacerbating factors noted.    In the ED patient was hypertensive (181/140) afebrile without leukocytosis, mild normocytic anemia noted, BUN 46, creatinine 2.7, EGFR 24, glucose 136, albumin 2.3, , TSH WNL, COVID positive, flu negative, UA showed +1 protein, +2 occult blood, CXR was negative for acute process.  Patient was placed in observation      Overview/Hospital Course:  Admission to hospital for BLE edema up hips due to acute on chronic diastolic heart failure and malnutrition. Patient also found  positive COVID 19 -asymptomatic on admission so Remdesivir discontinued. Edema improving with IV diuresis.  Became hypoxic night 7/29 requiring 2 L NC. Started Remdesivir/decadron (7/30), repeat CXR. Hypoglycemia (7/29) improved with oral intake and holding insulin.   He also have left  knee pain and edema similar to previous admission in June where Orthopedic surgery suspected gouty arthritis and attempted aspiration but only able to give steroid injection with improvement. No colchicine due to CKD. Left knee x ray on 7/17/23 showed significant suprapatellar effusion. Due to pain and decrease mobility, will ask Orthopedic Surgery to evaluate.  Hold apixaban  (last dose 7/29 2221).   Hgb treaded down 8.2>7.5>7.1>6.5 without over signs of bleeding. Had normal BM on 7/29 no melena or hematochezia. Previous iron studies low iron/t stat and elevated ferritin, normal vitamin b 12, and low folic acid. Start folic acid, ferrous sulfate and transfuse 1 unit of PRBC.   Recent admission 7/17-7/25 for sepsis due to shingles/cellulitis and ELIZABETH. Right buttock lesion from shingles are all in different part of healing phase without drainage. Also have gluteal cleft excoriation. Continue wound care as recommended by wound care team on previous admission-Cleanse both area with vashe, put mepilex to gluteal cleft, and put hydrogel to shingle lesion.   Also have right upper lateral back below axillary unroof blister and right foot ventral aspect skin tear. Keep clean and dry.     PT/OT evaluation completed. TN/SW to assist with SNF.               Interval History: No acute events overnight.  Noted isolated fever.  States he is feeling OK.  Legs still remain rather edematous.  All questions answered and patient had no further complaints.      Objective:     Vital Signs (Most Recent):  Temp: 98.3 °F (36.8 °C) (08/05/23 1605)  Pulse: 64 (08/05/23 1605)  Resp: 18 (08/05/23 1801)  BP: 133/62 (08/05/23 1605)  SpO2: (!) 92 % (08/05/23 1605)  Vital Signs (24h Range):  Temp:  [98.1 °F (36.7 °C)-100.4 °F (38 °C)] 98.3 °F (36.8 °C)  Pulse:  [62-66] 64  Resp:  [18-24] 18  SpO2:  [92 %-98 %] 92 %  BP: (110-135)/(59-63) 133/62     Weight: 98.9 kg (218 lb)  Body mass index is 30.4 kg/m².    Intake/Output Summary (Last 24 hours) at 8/5/2023 1830  Last data filed at 8/5/2023 1813  Gross per 24 hour   Intake 720 ml   Output 500 ml   Net 220 ml           Physical Exam  Vitals and nursing note reviewed.   Constitutional:       General: He is not in acute distress.     Appearance: He is well-developed. He is obese.   Cardiovascular:      Rate and Rhythm: Normal rate.   Pulmonary:      Effort: Pulmonary effort is normal. No respiratory distress.      Breath sounds: Normal breath sounds. No wheezing or rales.   Abdominal:      General: Bowel sounds are normal. There is no distension.      Palpations: Abdomen is soft.      Tenderness: There is no abdominal tenderness.   Musculoskeletal:         General: No tenderness. Normal range of motion.      Cervical back: Normal range of motion and neck supple.      Right lower leg: Edema present.      Left lower leg: Edema present.      Comments: Anasarca noted up to pelvis/abdomen  Feet/lower extremity edema improving but still with pitting edema extending up to abdomen   Skin:     General: Skin is warm and dry.      Findings: Lesion and rash present.      Comments: See media for pictures from previous admission   1. Gluteal cleft/buttocks -denuded and excoriated  2. Right buttock shingles lesions- no vesicles on right buttock and in different healing stage.   3. Right lateral side just below axillary small blisters less then eraser pencil size  4. Right foot ventral aspect skin tear without drainage at this time.      All stable/healing   Neurological:      Mental Status: He is alert and oriented to person, place, and time.   Psychiatric:         Thought Content: Thought content normal.             Significant Labs: All  pertinent labs within the past 24 hours have been reviewed.    Significant Imaging: I have reviewed all pertinent imaging results/findings within the past 24 hours.      Assessment/Plan:      * acute on chronic diastolic heart failure  -Admitted to inpatient status  -On admit had severe edema / anasarca - BLE up to hips and abdomen with edema inhibiting ambulation.  -Noted recent 2 D echo in June HFpEF.   -Strict ins/outs and daily weights  -Slowly improving, but still with significant deep edema in legs.  Net negative 5.5L thus far and CO2 remains normal.  -Increase lasix to 60mg TID    Anasarca  -Due to HFpEF, CKD, malnutrition, COVID 19 infection and immobility- addressing these issues separately.     CKD (chronic kidney disease), stage IV  -Baseline Cr 2.7-3.0.  -Cr now 2.0  -Remains fluid overloaded with edema  -Avoid nephrotoxic agents and renally dose meds  -Diurese as above  -Repeat BMP in AM    COVID-19 virus infection  -Initially asymptomatic now with non productive cough, 7/29 night 89% improve with O2 2 LNC  -CXR showed increase opacities and small pleural effusions  -Treated with dexamethasone, remdsivir, prn tessalon, albuterol inhaler bid and incentive spirometry  -Stopped dexamethasone due to hyperglycemia and he has completed remdesivir  -Clinically his covid infection has resolved and he is breathing comfortably on room air and symptom free  -Continue in covid isolation    Alcohol abuse  -Patient has a history of alcohol abuse however denies any recent used in the past week.    -No signs of DT this admission     Effusion of left knee  -X-ray on 07/17 showed significant suprapatellar effusion.  Previous admission in June orthopedic surgery attempted aspiration but no fluid then injected steroid with improvement in pain  -Uric acid remains elevated at 10.5, but notably improved from prior measurements this year  -No colchicine secondary to CKD  -Consulted orthopedic surgery and input appreciated - no  intervention at this time  -Today no complaints of pain.  -Added allopurinol 8/3    Multiple wounds  1. Gluteal cleft - excoriation- Wound care consult from previous admission few days ago-   2. Right buttock lesion from shingles healing at different stages - no vesicles  3. Right foot ventral aspect skin tear- clean with vashe and keep clean JASON  4. Right lateral back below armput few less then eraser size blister. - keep clean dry  -Wound care consulted and input appreciated  -Cleanse with vashe and put hydrogel to right buttock lesions from shingles     Debility Due to left knee pain and effusion due to gout? and/or injury  -Prior to admission patient used a roller walker for ambulation at baseline.  -On admit patient unable to ambulate even with walker due to swelling of his legs and left knee pain  -Xray of knee showed no acute findings  -orthopedic surgery consulted and input appreciated.  Ortho note 7/30 reviewed - report he does not ambulate and pain is chronic - he uses wheelchair and bed.  Noted L knee effusion felt secondary to arthritis and no evidence of infection or need for aspiration.  -PT/OT consulted and input appreciated.  Recommended SNF which we attempted to get approved, but after peer to peer today that was denied by his insurance company - [employer].    -Will discuss with patient and his niece - may need to consider NH placement.    Severe malnutrition  -Added protein supplementations with meals    Paroxysmal atrial flutter  -Stable  -Continue amiodarone, metoprolol and eliquis    BPH (benign prostatic hyperplasia)  -Chronic, stable, continue Flomax    Anemia  -Hgb drop this admission and compared to last month hgb 10-11 range.   -No melena or hematochezia noted.  -3/2016 EGD normal esophagus and erythematous stomach and duodenum but no mention ulcers/erosions  -CKD IV also contributing   -Low iron and t stat elevated ferritin, normal vitamin b 12, low folic acid  -Held eliquis initially but now  resumed.  -Received 1 unit PRBC and added PPI.  -Started folic acid, ferrous sulfate  -Hb now up to 8.1    Type 2 diabetes mellitus with kidney complication, with long-term current use of insulin  -A1c 7.8  -Noted significant hyperglycemia earlier in stay   -Improved but down to 70s on morning of 8/4 and 54 this morning - no symptoms  -Decrease detemir to 20 units and aspart to 5 units TIDWM    Dyslipidemia  -Chronic, stable, substitute for atorvastatin while in the hospital    Essential hypertension  -BP well controlled  -Continue home antihypertensives-amlodipine, hydralazine, metoprolol with parameters  -Prn hydralazine SBP >180      VTE Risk Mitigation (From admission, onward)         Ordered     apixaban tablet 5 mg  2 times daily         08/01/23 1648     Place MASOOD hose  Until discontinued         07/28/23 0719     IP VTE HIGH RISK PATIENT  Once         07/27/23 2326     Place sequential compression device  Until discontinued         07/27/23 2326     Reason for No Pharmacological VTE Prophylaxis  Once        Question:  Reasons:  Answer:  Already adequately anticoagulated on oral Anticoagulants    07/27/23 2326                Discharge Planning   ELY: 8/3/2023     Code Status: Full Code   Is the patient medically ready for discharge?:     Reason for patient still in hospital (select all that apply): Treatment  Discharge Plan A: Skilled Nursing Facility   Discharge Delays: (!) Post-Acute Set-up              Martin Young MD  Department of Hospital Medicine   [facility]

## 2023-08-05 NOTE — ASSESSMENT & PLAN NOTE
-Admitted to inpatient status  -On admit had severe edema / anasarca - BLE up to hips and abdomen with edema inhibiting ambulation.  -Noted recent 2 D echo in June HFpEF.   -Strict ins/outs and daily weights  -Slowly improving, but still with significant deep edema in legs.  Net negative 5.5L thus far and CO2 remains normal.  -Increase lasix to 60mg TID

## 2023-08-05 NOTE — ASSESSMENT & PLAN NOTE
-A1c 7.8  -Noted significant hyperglycemia earlier in stay   -Improved but down to 70s on morning of 8/4 and 54 this morning - no symptoms  -Decrease detemir to 20 units and aspart to 5 units TIDWM

## 2023-08-05 NOTE — NURSING
[facility] [place]  Nurses Note -- 4 Eyes      8/5/2023       Skin assessed on: Q Shift      [] No Pressure Injuries Present    []Prevention Measures Documented    [x] Yes LDA  for Pressure Injury Previously documented     [] Yes New Pressure Injury Discovered   [] LDA for New Pressure Injury Added      Attending RN:  Balbir Walker LPN     Second RN:  Mary PCT

## 2023-08-05 NOTE — ASSESSMENT & PLAN NOTE
-X-ray on 07/17 showed significant suprapatellar effusion.  Previous admission in June orthopedic surgery attempted aspiration but no fluid then injected steroid with improvement in pain  -Uric acid remains elevated at 10.5, but notably improved from prior measurements this year  -No colchicine secondary to CKD  -Consulted orthopedic surgery and input appreciated - no intervention at this time  -Today no complaints of pain.  -Added allopurinol 8/3

## 2023-08-05 NOTE — ASSESSMENT & PLAN NOTE
-BP well controlled  -Continue home antihypertensives-amlodipine, hydralazine, metoprolol with parameters  -Prn hydralazine SBP >180

## 2023-08-06 LAB
ANION GAP SERPL CALC-SCNC: 11 MMOL/L (ref 8–16)
BASOPHILS # BLD AUTO: 0.02 K/UL (ref 0–0.2)
BASOPHILS NFR BLD: 0.2 % (ref 0–1.9)
BUN SERPL-MCNC: 78 MG/DL (ref 8–23)
CALCIUM SERPL-MCNC: 8.8 MG/DL (ref 8.7–10.5)
CHLORIDE SERPL-SCNC: 104 MMOL/L (ref 95–110)
CO2 SERPL-SCNC: 21 MMOL/L (ref 23–29)
CREAT SERPL-MCNC: 2.4 MG/DL (ref 0.5–1.4)
DIFFERENTIAL METHOD: ABNORMAL
EOSINOPHIL # BLD AUTO: 0.1 K/UL (ref 0–0.5)
EOSINOPHIL NFR BLD: 1.4 % (ref 0–8)
ERYTHROCYTE [DISTWIDTH] IN BLOOD BY AUTOMATED COUNT: 16.7 % (ref 11.5–14.5)
EST. GFR  (NO RACE VARIABLE): 28 ML/MIN/1.73 M^2
GLUCOSE SERPL-MCNC: 79 MG/DL (ref 70–110)
HCT VFR BLD AUTO: 25.1 % (ref 40–54)
HGB BLD-MCNC: 8.1 G/DL (ref 14–18)
IMM GRANULOCYTES # BLD AUTO: 0.34 K/UL (ref 0–0.04)
IMM GRANULOCYTES NFR BLD AUTO: 3.8 % (ref 0–0.5)
LYMPHOCYTES # BLD AUTO: 0.8 K/UL (ref 1–4.8)
LYMPHOCYTES NFR BLD: 9.2 % (ref 18–48)
MAGNESIUM SERPL-MCNC: 1.9 MG/DL (ref 1.6–2.6)
MCH RBC QN AUTO: 27.1 PG (ref 27–31)
MCHC RBC AUTO-ENTMCNC: 32.3 G/DL (ref 32–36)
MCV RBC AUTO: 84 FL (ref 82–98)
MONOCYTES # BLD AUTO: 1.6 K/UL (ref 0.3–1)
MONOCYTES NFR BLD: 17.8 % (ref 4–15)
NEUTROPHILS # BLD AUTO: 6.1 K/UL (ref 1.8–7.7)
NEUTROPHILS NFR BLD: 67.6 % (ref 38–73)
NRBC BLD-RTO: 0 /100 WBC
PLATELET # BLD AUTO: 294 K/UL (ref 150–450)
PMV BLD AUTO: 8.8 FL (ref 9.2–12.9)
POCT GLUCOSE: 142 MG/DL (ref 70–110)
POCT GLUCOSE: 176 MG/DL (ref 70–110)
POCT GLUCOSE: 197 MG/DL (ref 70–110)
POCT GLUCOSE: 215 MG/DL (ref 70–110)
POCT GLUCOSE: 84 MG/DL (ref 70–110)
POTASSIUM SERPL-SCNC: 3.8 MMOL/L (ref 3.5–5.1)
RBC # BLD AUTO: 2.99 M/UL (ref 4.6–6.2)
SODIUM SERPL-SCNC: 136 MMOL/L (ref 136–145)
WBC # BLD AUTO: 9 K/UL (ref 3.9–12.7)

## 2023-08-06 PROCEDURE — 36415 COLL VENOUS BLD VENIPUNCTURE: CPT | Performed by: HOSPITALIST

## 2023-08-06 PROCEDURE — 11000001 HC ACUTE MED/SURG PRIVATE ROOM

## 2023-08-06 PROCEDURE — 25000003 PHARM REV CODE 250: Performed by: HOSPITALIST

## 2023-08-06 PROCEDURE — 25000003 PHARM REV CODE 250: Performed by: STUDENT IN AN ORGANIZED HEALTH CARE EDUCATION/TRAINING PROGRAM

## 2023-08-06 PROCEDURE — 25000003 PHARM REV CODE 250

## 2023-08-06 PROCEDURE — 80048 BASIC METABOLIC PNL TOTAL CA: CPT | Performed by: HOSPITALIST

## 2023-08-06 PROCEDURE — 63600175 PHARM REV CODE 636 W HCPCS: Performed by: HOSPITALIST

## 2023-08-06 PROCEDURE — 83735 ASSAY OF MAGNESIUM: CPT | Performed by: HOSPITALIST

## 2023-08-06 PROCEDURE — 27000207 HC ISOLATION

## 2023-08-06 PROCEDURE — 25000003 PHARM REV CODE 250: Performed by: NURSE PRACTITIONER

## 2023-08-06 PROCEDURE — 85025 COMPLETE CBC W/AUTO DIFF WBC: CPT | Performed by: HOSPITALIST

## 2023-08-06 RX ADMIN — ALLOPURINOL 100 MG: 100 TABLET ORAL at 08:08

## 2023-08-06 RX ADMIN — FUROSEMIDE 60 MG: 10 INJECTION, SOLUTION INTRAMUSCULAR; INTRAVENOUS at 01:08

## 2023-08-06 RX ADMIN — MUPIROCIN: 20 OINTMENT TOPICAL at 08:08

## 2023-08-06 RX ADMIN — FUROSEMIDE 60 MG: 10 INJECTION, SOLUTION INTRAMUSCULAR; INTRAVENOUS at 10:08

## 2023-08-06 RX ADMIN — TAMSULOSIN HYDROCHLORIDE 0.4 MG: 0.4 CAPSULE ORAL at 08:08

## 2023-08-06 RX ADMIN — CAPSAICIN: 0.25 CREAM TOPICAL at 08:08

## 2023-08-06 RX ADMIN — THERA TABS 1 TABLET: TAB at 08:08

## 2023-08-06 RX ADMIN — GABAPENTIN 100 MG: 100 CAPSULE ORAL at 08:08

## 2023-08-06 RX ADMIN — FOLIC ACID 1 MG: 1 TABLET ORAL at 08:08

## 2023-08-06 RX ADMIN — INSULIN ASPART 2 UNITS: 100 INJECTION, SOLUTION INTRAVENOUS; SUBCUTANEOUS at 12:08

## 2023-08-06 RX ADMIN — HYDROCODONE BITARTRATE AND ACETAMINOPHEN 1 TABLET: 5; 325 TABLET ORAL at 04:08

## 2023-08-06 RX ADMIN — AMLODIPINE BESYLATE 10 MG: 5 TABLET ORAL at 08:08

## 2023-08-06 RX ADMIN — METOPROLOL SUCCINATE 25 MG: 25 TABLET, EXTENDED RELEASE ORAL at 08:08

## 2023-08-06 RX ADMIN — LIDOCAINE 5% 1 PATCH: 700 PATCH TOPICAL at 05:08

## 2023-08-06 RX ADMIN — ATORVASTATIN CALCIUM 80 MG: 40 TABLET, FILM COATED ORAL at 08:08

## 2023-08-06 RX ADMIN — INSULIN ASPART 5 UNITS: 100 INJECTION, SOLUTION INTRAVENOUS; SUBCUTANEOUS at 05:08

## 2023-08-06 RX ADMIN — AMIODARONE HYDROCHLORIDE 200 MG: 200 TABLET ORAL at 08:08

## 2023-08-06 RX ADMIN — FUROSEMIDE 60 MG: 10 INJECTION, SOLUTION INTRAMUSCULAR; INTRAVENOUS at 06:08

## 2023-08-06 RX ADMIN — INSULIN ASPART 5 UNITS: 100 INJECTION, SOLUTION INTRAVENOUS; SUBCUTANEOUS at 12:08

## 2023-08-06 RX ADMIN — CAPSAICIN: 0.25 CREAM TOPICAL at 09:08

## 2023-08-06 RX ADMIN — APIXABAN 5 MG: 5 TABLET, FILM COATED ORAL at 09:08

## 2023-08-06 RX ADMIN — APIXABAN 5 MG: 5 TABLET, FILM COATED ORAL at 08:08

## 2023-08-06 RX ADMIN — OXYCODONE HYDROCHLORIDE AND ACETAMINOPHEN 500 MG: 500 TABLET ORAL at 09:08

## 2023-08-06 RX ADMIN — INSULIN ASPART 4 UNITS: 100 INJECTION, SOLUTION INTRAVENOUS; SUBCUTANEOUS at 05:08

## 2023-08-06 RX ADMIN — MUPIROCIN: 20 OINTMENT TOPICAL at 09:08

## 2023-08-06 RX ADMIN — HYDROCODONE BITARTRATE AND ACETAMINOPHEN 1 TABLET: 5; 325 TABLET ORAL at 01:08

## 2023-08-06 RX ADMIN — OXYCODONE HYDROCHLORIDE AND ACETAMINOPHEN 500 MG: 500 TABLET ORAL at 08:08

## 2023-08-06 RX ADMIN — GABAPENTIN 100 MG: 100 CAPSULE ORAL at 09:08

## 2023-08-06 NOTE — SUBJECTIVE & OBJECTIVE
Interval History: No acute events overnight.  Feeling good today.  Not sure if he is agreeable to go to [facility], but also unsure if it is safe for him to go home alone.  No further fevers.  Denies pain.  Legs still remain rather edematous.  All questions answered and patient had no further complaints.      Objective:     Vital Signs (Most Recent):  Temp: 98.1 °F (36.7 °C) (08/06/23 0720)  Pulse: 62 (08/06/23 0823)  Resp: 20 (08/06/23 0720)  BP: 123/60 (08/06/23 0823)  SpO2: 100 % (08/06/23 0720) Vital Signs (24h Range):  Temp:  [98.1 °F (36.7 °C)-98.9 °F (37.2 °C)] 98.1 °F (36.7 °C)  Pulse:  [61-68] 62  Resp:  [18-24] 20  SpO2:  [92 %-100 %] 100 %  BP: (123-147)/(60-67) 123/60     Weight: 98.9 kg (218 lb)  Body mass index is 30.4 kg/m².    Intake/Output Summary (Last 24 hours) at 8/6/2023 0841  Last data filed at 8/6/2023 0838  Gross per 24 hour   Intake 1200 ml   Output 600 ml   Net 600 ml           Physical Exam  Vitals and nursing note reviewed.   Constitutional:       General: He is not in acute distress.     Appearance: He is well-developed. He is obese.   Cardiovascular:      Rate and Rhythm: Normal rate.   Pulmonary:      Effort: Pulmonary effort is normal. No respiratory distress.      Breath sounds: Normal breath sounds. No wheezing or rales.   Abdominal:      General: Bowel sounds are normal. There is no distension.      Palpations: Abdomen is soft.      Tenderness: There is no abdominal tenderness.   Musculoskeletal:         General: No tenderness. Normal range of motion.      Cervical back: Normal range of motion and neck supple.      Right lower leg: Edema present.      Left lower leg: Edema present.      Comments: Anasarca noted up to pelvis/abdomen  Feet/lower extremity edema improving but still with pitting edema extending up to abdomen   Skin:     General: Skin is warm and dry.      Findings: Lesion and rash present.      Comments: See media for pictures from previous admission   1. Gluteal  cleft/buttocks -denuded and excoriated  2. Right buttock shingles lesions- no vesicles on right buttock and in different healing stage.   3. Right lateral side just below axillary small blisters less then eraser pencil size  4. Right foot ventral aspect skin tear without drainage at this time.      All stable/healing   Neurological:      Mental Status: He is alert and oriented to person, place, and time.   Psychiatric:         Thought Content: Thought content normal.             Significant Labs: All pertinent labs within the past 24 hours have been reviewed.    Significant Imaging: I have reviewed all pertinent imaging results/findings within the past 24 hours.

## 2023-08-06 NOTE — NURSING
[facility] [place]  Nurses Note -- 4 Eyes      8/6/2023       Skin assessed on: Q Shift      [] No Pressure Injuries Present    []Prevention Measures Documented    [x] Yes LDA  for Pressure Injury Previously documented     [] Yes New Pressure Injury Discovered   [] LDA for New Pressure Injury Added      Attending RN:  Roberto Allen RN     Second RN:  Balbir GUDINO LPN

## 2023-08-06 NOTE — ASSESSMENT & PLAN NOTE
-Hgb drop this admission and compared to last month hgb 10-11 range.   -No melena or hematochezia noted.  -3/2016 EGD normal esophagus and erythematous stomach and duodenum but no mention ulcers/erosions  -CKD IV also contributing   -Low iron and t stat elevated ferritin, normal vitamin b 12, low folic acid  -Held eliquis initially but now resumed.  -Received 1 unit PRBC and added PPI.  -Started folic acid, ferrous sulfate  -Hb now 8.1

## 2023-08-06 NOTE — PT/OT/SLP PROGRESS
Physical Therapy      Patient Name:  Chato Bowie   MRN:  [id number]    Patient not seen today secondary to Other (Comment) (Pt declined with max motivation). Will follow-up .

## 2023-08-06 NOTE — NURSING
Pt resting in bed asleep, no complaints of pain/discomfort. Scheduled medications given. IV saline lock. Tele #[id number]. Pt remains free from fall/injury. Accu checks w/ insulin coverage. Snack given. Pt on room air; no distress noted. Isolation precautions maintained. Safety measures maintained. Will cont to monitor

## 2023-08-06 NOTE — ASSESSMENT & PLAN NOTE
-A1c 7.8  -Noted significant hyperglycemia earlier in stay   -Improved but down to 70s on morning of 8/4 and 54 on 8/5.  Today sugars 79 without symptoms.  -Continue with detemir 20 units and aspart 5 units TIDWM

## 2023-08-06 NOTE — ASSESSMENT & PLAN NOTE
-Admitted to inpatient status  -On admit had severe edema / anasarca - BLE up to hips and abdomen with edema inhibiting ambulation.  -Noted recent 2 D echo in June HFpEF.   -Strict ins/outs and daily weights  -Slowly improving, but still with significant deep edema in legs.  Net negative 5.L thus far and CO2 remains normal.  -Continue lasix 60mg TID

## 2023-08-06 NOTE — PLAN OF CARE
Problem: Adult Inpatient Plan of Care  Goal: Plan of Care Review  Outcome: Ongoing, Progressing  Goal: Patient-Specific Goal (Individualized)  Outcome: Ongoing, Progressing     Problem: Adult Inpatient Plan of Care  Goal: Plan of Care Review  Outcome: Ongoing, Progressing     Problem: Adult Inpatient Plan of Care  Goal: Plan of Care Review  Outcome: Ongoing, Progressing     Problem: Adult Inpatient Plan of Care  Goal: Patient-Specific Goal (Individualized)  Outcome: Ongoing, Progressing     Problem: Adult Inpatient Plan of Care  Goal: Patient-Specific Goal (Individualized)  Outcome: Ongoing, Progressing

## 2023-08-06 NOTE — ASSESSMENT & PLAN NOTE
-Prior to admission patient used a roller walker for ambulation at baseline.  -On admit patient unable to ambulate even with walker due to swelling of his legs and left knee pain  -Xray of knee showed no acute findings  -orthopedic surgery consulted and input appreciated.  Ortho note 7/30 reviewed - report he does not ambulate and pain is chronic - he uses wheelchair and bed.  Noted L knee effusion felt secondary to arthritis and no evidence of infection or need for aspiration.  -PT/OT consulted and input appreciated.  Recommended SNF which we attempted to get approved, but after peer to peer today that was denied by his insurance company - [employer].    -Discussed with patient 8/6 - states he prefers to go home with HH but not confident it is safe for him.  States he will think about it and discuss with his niece.

## 2023-08-06 NOTE — PROGRESS NOTES
[facility] Medicine  Progress Note    Patient Name: Chato Bowie  MRN: [id number]  Patient Class: IP- Inpatient   Admission Date: 7/27/2023  Length of Stay: 7 days  Attending Physician: Martin Young MD  Primary Care Provider: Irlanda Valenzuela -        Subjective:     Principal Problem:Chronic diastolic heart failure        HPI:  Chato Bowie 72 y.o. male with CHF, CAD, DM, HTN, HLD, presents to the hospital with a chief complaint of BLE swelling and pain.  onset several weeks ago per chart review, patient was recently discharged from the ED yesterday for similar complaints, and was discharged from inpatient status on 07/25/2023. the patient had endorsed no relief to his complaints since discharge. Patient currently reports complaints of BLE swelling with associated pain. He states his complaints have been ongoing for a few weeks, but had worsened as of recent. He additionally reports he last had similar complaints a month ago, noting he had to be admitted for a couple of nights. He endorses compliance with antihypertensives and diuretics. He reports following with his PCP at [facility], but reports he does not know who is his cardiologist. Denies fever, shortness of breath, or cough.  Patient's niece states that the patient does not live in assisted living however he does need it as she can not provide care for him especially due to him being COVID positive.  No other alleviating or exacerbating factors noted.    In the ED patient was hypertensive (181/140) afebrile without leukocytosis, mild normocytic anemia noted, BUN 46, creatinine 2.7, EGFR 24, glucose 136, albumin 2.3, , TSH WNL, COVID positive, flu negative, UA showed +1 protein, +2 occult blood, CXR was negative for acute process.  Patient was placed in observation      Overview/Hospital Course:  Admission to hospital for BLE edema up hips due to acute on chronic diastolic heart failure and malnutrition. Patient also found  positive COVID 19 -asymptomatic on admission so Remdesivir discontinued. Edema improving with IV diuresis.  Became hypoxic night 7/29 requiring 2 L NC. Started Remdesivir/decadron (7/30), repeat CXR. Hypoglycemia (7/29) improved with oral intake and holding insulin.   He also have left  knee pain and edema similar to previous admission in June where Orthopedic surgery suspected gouty arthritis and attempted aspiration but only able to give steroid injection with improvement. No colchicine due to CKD. Left knee x ray on 7/17/23 showed significant suprapatellar effusion. Due to pain and decrease mobility, will ask Orthopedic Surgery to evaluate.  Hold apixaban  (last dose 7/29 2221).   Hgb treaded down 8.2>7.5>7.1>6.5 without over signs of bleeding. Had normal BM on 7/29 no melena or hematochezia. Previous iron studies low iron/t stat and elevated ferritin, normal vitamin b 12, and low folic acid. Start folic acid, ferrous sulfate and transfuse 1 unit of PRBC.   Recent admission 7/17-7/25 for sepsis due to shingles/cellulitis and ELIZABETH. Right buttock lesion from shingles are all in different part of healing phase without drainage. Also have gluteal cleft excoriation. Continue wound care as recommended by wound care team on previous admission-Cleanse both area with vashe, put mepilex to gluteal cleft, and put hydrogel to shingle lesion.   Also have right upper lateral back below axillary unroof blister and right foot ventral aspect skin tear. Keep clean and dry.     PT/OT evaluation completed. TN/SW to assist with SNF.               Interval History: No acute events overnight.  Feeling good today.  Not sure if he is agreeable to go to [facility], but also unsure if it is safe for him to go home alone.  No further fevers.  Denies pain.  Legs still remain rather edematous.  All questions answered and patient had no further complaints.      Objective:     Vital Signs (Most Recent):  Temp: 98.1 °F (36.7 °C) (08/06/23  0720)  Pulse: 62 (08/06/23 0823)  Resp: 20 (08/06/23 0720)  BP: 123/60 (08/06/23 0823)  SpO2: 100 % (08/06/23 0720) Vital Signs (24h Range):  Temp:  [98.1 °F (36.7 °C)-98.9 °F (37.2 °C)] 98.1 °F (36.7 °C)  Pulse:  [61-68] 62  Resp:  [18-24] 20  SpO2:  [92 %-100 %] 100 %  BP: (123-147)/(60-67) 123/60     Weight: 98.9 kg (218 lb)  Body mass index is 30.4 kg/m².    Intake/Output Summary (Last 24 hours) at 8/6/2023 0841  Last data filed at 8/6/2023 0838  Gross per 24 hour   Intake 1200 ml   Output 600 ml   Net 600 ml           Physical Exam  Vitals and nursing note reviewed.   Constitutional:       General: He is not in acute distress.     Appearance: He is well-developed. He is obese.   Cardiovascular:      Rate and Rhythm: Normal rate.   Pulmonary:      Effort: Pulmonary effort is normal. No respiratory distress.      Breath sounds: Normal breath sounds. No wheezing or rales.   Abdominal:      General: Bowel sounds are normal. There is no distension.      Palpations: Abdomen is soft.      Tenderness: There is no abdominal tenderness.   Musculoskeletal:         General: No tenderness. Normal range of motion.      Cervical back: Normal range of motion and neck supple.      Right lower leg: Edema present.      Left lower leg: Edema present.      Comments: Anasarca noted up to pelvis/abdomen  Feet/lower extremity edema improving but still with pitting edema extending up to abdomen   Skin:     General: Skin is warm and dry.      Findings: Lesion and rash present.      Comments: See media for pictures from previous admission   1. Gluteal cleft/buttocks -denuded and excoriated  2. Right buttock shingles lesions- no vesicles on right buttock and in different healing stage.   3. Right lateral side just below axillary small blisters less then eraser pencil size  4. Right foot ventral aspect skin tear without drainage at this time.      All stable/healing   Neurological:      Mental Status: He is alert and oriented to person,  place, and time.   Psychiatric:         Thought Content: Thought content normal.             Significant Labs: All pertinent labs within the past 24 hours have been reviewed.    Significant Imaging: I have reviewed all pertinent imaging results/findings within the past 24 hours.        Assessment/Plan:      * acute on chronic diastolic heart failure  -Admitted to inpatient status  -On admit had severe edema / anasarca - BLE up to hips and abdomen with edema inhibiting ambulation.  -Noted recent 2 D echo in June HFpEF.   -Strict ins/outs and daily weights  -Slowly improving, but still with significant deep edema in legs.  Net negative 5.L thus far and CO2 remains normal.  -Continue lasix 60mg TID    Anasarca  -Due to HFpEF, CKD, malnutrition, COVID 19 infection and immobility- addressing these issues separately.     CKD (chronic kidney disease), stage IV  -Baseline Cr 2.7-3.0.  -Cr now 2.4.  Noted CO2 21 - opposite response expected with diuresis.  -Remains fluid overloaded with edema  -Avoid nephrotoxic agents and renally dose meds  -Diurese as above  -Repeat BMP in AM    COVID-19 virus infection  -Initially asymptomatic now with non productive cough, 7/29 night 89% improve with O2 2 LNC  -CXR showed increase opacities and small pleural effusions  -Treated with dexamethasone, remdsivir, prn tessalon, albuterol inhaler bid and incentive spirometry  -Stopped dexamethasone due to hyperglycemia and he has completed remdesivir  -Clinically his covid infection has resolved and he is breathing comfortably on room air and symptom free  -Continue in covid isolation    Alcohol abuse  -Patient has a history of alcohol abuse however denies any recent used in the past week.    -No signs of DT this admission     Effusion of left knee  -X-ray on 07/17 showed significant suprapatellar effusion.  Previous admission in June orthopedic surgery attempted aspiration but no fluid then injected steroid with improvement in pain  -Uric acid  remains elevated at 10.5, but notably improved from prior measurements this year  -No colchicine secondary to CKD  -Consulted orthopedic surgery and input appreciated - no intervention at this time  -Today no complaints of pain.  -Added allopurinol 8/3    Multiple wounds  1. Gluteal cleft - excoriation- Wound care consult from previous admission few days ago-   2. Right buttock lesion from shingles healing at different stages - no vesicles  3. Right foot ventral aspect skin tear- clean with vashe and keep clean JASON  4. Right lateral back below armput few less then eraser size blister. - keep clean dry  -Wound care consulted and input appreciated  -Cleanse with vashe and put hydrogel to right buttock lesions from shingles     Debility Due to left knee pain and effusion due to gout? and/or injury  -Prior to admission patient used a roller walker for ambulation at baseline.  -On admit patient unable to ambulate even with walker due to swelling of his legs and left knee pain  -Xray of knee showed no acute findings  -orthopedic surgery consulted and input appreciated.  Ortho note 7/30 reviewed - report he does not ambulate and pain is chronic - he uses wheelchair and bed.  Noted L knee effusion felt secondary to arthritis and no evidence of infection or need for aspiration.  -PT/OT consulted and input appreciated.  Recommended SNF which we attempted to get approved, but after peer to peer today that was denied by his insurance company - [employer].    -Discussed with patient 8/6 - states he prefers to go home with HH but not confident it is safe for him.  States he will think about it and discuss with his niece.    Severe malnutrition  -Added protein supplementations with meals    Paroxysmal atrial flutter  -Stable  -Continue amiodarone, metoprolol and eliquis    BPH (benign prostatic hyperplasia)  -Chronic, stable, continue Flomax    Anemia  -Hgb drop this admission and compared to last month hgb 10-11 range.   -No melena or  hematochezia noted.  -3/2016 EGD normal esophagus and erythematous stomach and duodenum but no mention ulcers/erosions  -CKD IV also contributing   -Low iron and t stat elevated ferritin, normal vitamin b 12, low folic acid  -Held eliquis initially but now resumed.  -Received 1 unit PRBC and added PPI.  -Started folic acid, ferrous sulfate  -Hb now 8.1    Type 2 diabetes mellitus with kidney complication, with long-term current use of insulin  -A1c 7.8  -Noted significant hyperglycemia earlier in stay   -Improved but down to 70s on morning of 8/4 and 54 on 8/5.  Today sugars 79 without symptoms.  -Continue with detemir 20 units and aspart 5 units TIDWM    Dyslipidemia  -Chronic, stable, substitute for atorvastatin while in the hospital    Essential hypertension  -BP well controlled  -Continue home antihypertensives-amlodipine, hydralazine, metoprolol with parameters  -Prn hydralazine SBP >180      VTE Risk Mitigation (From admission, onward)         Ordered     apixaban tablet 5 mg  2 times daily         08/01/23 1648     Place MASOOD hose  Until discontinued         07/28/23 0719     IP VTE HIGH RISK PATIENT  Once         07/27/23 2326     Place sequential compression device  Until discontinued         07/27/23 2326     Reason for No Pharmacological VTE Prophylaxis  Once        Question:  Reasons:  Answer:  Already adequately anticoagulated on oral Anticoagulants    07/27/23 2326                Discharge Planning   ELY: 8/3/2023     Code Status: Full Code   Is the patient medically ready for discharge?:     Reason for patient still in hospital (select all that apply): Treatment  Discharge Plan A: Skilled Nursing Facility   Discharge Delays: (!) Post-Acute Set-up              Martin Young MD  Department of Hospital Medicine   [facility]

## 2023-08-06 NOTE — NURSING
[facility]  Nurses Note -- 4 Eyes      8/5/2023       Skin assessed on: Q Shift      [] No Pressure Injuries Present    []Prevention Measures Documented    [] Yes LDA  for Pressure Injury Previously documented     [x] Yes New Pressure Injury Discovered   [] LDA for New Pressure Injury Added      Attending RN:  Regina Staples RN     Second LPN: Balbir

## 2023-08-06 NOTE — ASSESSMENT & PLAN NOTE
-Baseline Cr 2.7-3.0.  -Cr now 2.4.  Noted CO2 21 - opposite response expected with diuresis.  -Remains fluid overloaded with edema  -Avoid nephrotoxic agents and renally dose meds  -Diurese as above  -Repeat BMP in AM

## 2023-08-07 LAB
ANION GAP SERPL CALC-SCNC: 10 MMOL/L (ref 8–16)
BASOPHILS # BLD AUTO: 0.02 K/UL (ref 0–0.2)
BASOPHILS NFR BLD: 0.2 % (ref 0–1.9)
BUN SERPL-MCNC: 83 MG/DL (ref 8–23)
CALCIUM SERPL-MCNC: 8.5 MG/DL (ref 8.7–10.5)
CHLORIDE SERPL-SCNC: 101 MMOL/L (ref 95–110)
CO2 SERPL-SCNC: 23 MMOL/L (ref 23–29)
CREAT SERPL-MCNC: 2.5 MG/DL (ref 0.5–1.4)
DIFFERENTIAL METHOD: ABNORMAL
EOSINOPHIL # BLD AUTO: 0.1 K/UL (ref 0–0.5)
EOSINOPHIL NFR BLD: 1 % (ref 0–8)
ERYTHROCYTE [DISTWIDTH] IN BLOOD BY AUTOMATED COUNT: 16.6 % (ref 11.5–14.5)
EST. GFR  (NO RACE VARIABLE): 27 ML/MIN/1.73 M^2
GLUCOSE SERPL-MCNC: 96 MG/DL (ref 70–110)
HCT VFR BLD AUTO: 24.9 % (ref 40–54)
HGB BLD-MCNC: 8.2 G/DL (ref 14–18)
IMM GRANULOCYTES # BLD AUTO: 0.17 K/UL (ref 0–0.04)
IMM GRANULOCYTES NFR BLD AUTO: 2 % (ref 0–0.5)
LYMPHOCYTES # BLD AUTO: 0.7 K/UL (ref 1–4.8)
LYMPHOCYTES NFR BLD: 8.5 % (ref 18–48)
MAGNESIUM SERPL-MCNC: 1.8 MG/DL (ref 1.6–2.6)
MCH RBC QN AUTO: 27.1 PG (ref 27–31)
MCHC RBC AUTO-ENTMCNC: 32.9 G/DL (ref 32–36)
MCV RBC AUTO: 82 FL (ref 82–98)
MONOCYTES # BLD AUTO: 1.7 K/UL (ref 0.3–1)
MONOCYTES NFR BLD: 20 % (ref 4–15)
NEUTROPHILS # BLD AUTO: 5.9 K/UL (ref 1.8–7.7)
NEUTROPHILS NFR BLD: 68.3 % (ref 38–73)
NRBC BLD-RTO: 0 /100 WBC
PLATELET # BLD AUTO: 262 K/UL (ref 150–450)
PMV BLD AUTO: 8.8 FL (ref 9.2–12.9)
POCT GLUCOSE: 128 MG/DL (ref 70–110)
POCT GLUCOSE: 139 MG/DL (ref 70–110)
POCT GLUCOSE: 142 MG/DL (ref 70–110)
POCT GLUCOSE: 62 MG/DL (ref 70–110)
POCT GLUCOSE: 94 MG/DL (ref 70–110)
POTASSIUM SERPL-SCNC: 3.7 MMOL/L (ref 3.5–5.1)
RBC # BLD AUTO: 3.03 M/UL (ref 4.6–6.2)
SODIUM SERPL-SCNC: 134 MMOL/L (ref 136–145)
WBC # BLD AUTO: 8.61 K/UL (ref 3.9–12.7)

## 2023-08-07 PROCEDURE — 25000003 PHARM REV CODE 250: Performed by: STUDENT IN AN ORGANIZED HEALTH CARE EDUCATION/TRAINING PROGRAM

## 2023-08-07 PROCEDURE — 83735 ASSAY OF MAGNESIUM: CPT | Performed by: HOSPITALIST

## 2023-08-07 PROCEDURE — 25000003 PHARM REV CODE 250: Performed by: HOSPITALIST

## 2023-08-07 PROCEDURE — 25000003 PHARM REV CODE 250

## 2023-08-07 PROCEDURE — 85025 COMPLETE CBC W/AUTO DIFF WBC: CPT | Performed by: HOSPITALIST

## 2023-08-07 PROCEDURE — 36415 COLL VENOUS BLD VENIPUNCTURE: CPT | Performed by: HOSPITALIST

## 2023-08-07 PROCEDURE — 25000003 PHARM REV CODE 250: Performed by: NURSE PRACTITIONER

## 2023-08-07 PROCEDURE — 11000001 HC ACUTE MED/SURG PRIVATE ROOM

## 2023-08-07 PROCEDURE — 27000207 HC ISOLATION

## 2023-08-07 PROCEDURE — 80048 BASIC METABOLIC PNL TOTAL CA: CPT | Performed by: HOSPITALIST

## 2023-08-07 PROCEDURE — 63600175 PHARM REV CODE 636 W HCPCS: Performed by: HOSPITALIST

## 2023-08-07 RX ORDER — LIDOCAINE 50 MG/G
2 PATCH TOPICAL
Status: DISCONTINUED | OUTPATIENT
Start: 2023-08-07 | End: 2023-08-16 | Stop reason: HOSPADM

## 2023-08-07 RX ADMIN — OXYCODONE HYDROCHLORIDE AND ACETAMINOPHEN 500 MG: 500 TABLET ORAL at 09:08

## 2023-08-07 RX ADMIN — TAMSULOSIN HYDROCHLORIDE 0.4 MG: 0.4 CAPSULE ORAL at 09:08

## 2023-08-07 RX ADMIN — THERA TABS 1 TABLET: TAB at 09:08

## 2023-08-07 RX ADMIN — GABAPENTIN 100 MG: 100 CAPSULE ORAL at 09:08

## 2023-08-07 RX ADMIN — FUROSEMIDE 60 MG: 10 INJECTION, SOLUTION INTRAMUSCULAR; INTRAVENOUS at 06:08

## 2023-08-07 RX ADMIN — AMIODARONE HYDROCHLORIDE 200 MG: 200 TABLET ORAL at 09:08

## 2023-08-07 RX ADMIN — METOPROLOL SUCCINATE 25 MG: 25 TABLET, EXTENDED RELEASE ORAL at 09:08

## 2023-08-07 RX ADMIN — FOLIC ACID 1 MG: 1 TABLET ORAL at 09:08

## 2023-08-07 RX ADMIN — FUROSEMIDE 60 MG: 10 INJECTION, SOLUTION INTRAMUSCULAR; INTRAVENOUS at 02:08

## 2023-08-07 RX ADMIN — CAPSAICIN: 0.25 CREAM TOPICAL at 09:08

## 2023-08-07 RX ADMIN — HYDROCODONE BITARTRATE AND ACETAMINOPHEN 1 TABLET: 5; 325 TABLET ORAL at 01:08

## 2023-08-07 RX ADMIN — ALLOPURINOL 100 MG: 100 TABLET ORAL at 09:08

## 2023-08-07 RX ADMIN — FUROSEMIDE 60 MG: 10 INJECTION, SOLUTION INTRAMUSCULAR; INTRAVENOUS at 09:08

## 2023-08-07 RX ADMIN — LIDOCAINE 5% 2 PATCH: 700 PATCH TOPICAL at 06:08

## 2023-08-07 RX ADMIN — AMLODIPINE BESYLATE 10 MG: 5 TABLET ORAL at 09:08

## 2023-08-07 RX ADMIN — APIXABAN 5 MG: 5 TABLET, FILM COATED ORAL at 09:08

## 2023-08-07 RX ADMIN — ATORVASTATIN CALCIUM 80 MG: 40 TABLET, FILM COATED ORAL at 09:08

## 2023-08-07 NOTE — SUBJECTIVE & OBJECTIVE
Interval History: No acute events overnight.  Notes discomfort in left knee.  Starting to see some wrinkling in skin on legs today!  Today he states he cannot care for himself and is agreeable to NH placement.  No further fevers.  All questions answered and patient had no further complaints.      Objective:     Vital Signs (Most Recent):  Temp: 98.1 °F (36.7 °C) (08/07/23 1102)  Pulse: 66 (08/07/23 1102)  Resp: 19 (08/07/23 1102)  BP: 127/62 (08/07/23 1102)  SpO2: 96 % (08/07/23 1102) Vital Signs (24h Range):  Temp:  [97.8 °F (36.6 °C)-98.8 °F (37.1 °C)] 98.1 °F (36.7 °C)  Pulse:  [60-70] 66  Resp:  [18-24] 19  SpO2:  [91 %-100 %] 96 %  BP: (124-146)/(58-66) 127/62     Weight: 98.9 kg (218 lb)  Body mass index is 30.4 kg/m².    Intake/Output Summary (Last 24 hours) at 8/7/2023 1314  Last data filed at 8/7/2023 1254  Gross per 24 hour   Intake 1060 ml   Output --   Net 1060 ml           Physical Exam  Vitals and nursing note reviewed.   Constitutional:       General: He is not in acute distress.     Appearance: He is well-developed. He is obese.   Cardiovascular:      Rate and Rhythm: Normal rate.   Pulmonary:      Effort: Pulmonary effort is normal. No respiratory distress.      Breath sounds: Normal breath sounds. No wheezing or rales.   Abdominal:      General: Bowel sounds are normal. There is no distension.      Palpations: Abdomen is soft.      Tenderness: There is no abdominal tenderness.   Musculoskeletal:         General: No tenderness. Normal range of motion.      Cervical back: Normal range of motion and neck supple.      Right lower leg: Edema present.      Left lower leg: Edema present.      Comments: Anasarca noted up to pelvis/abdomen  Feet/lower extremity edema improving but still with pitting edema extending up to abdomen   Skin:     General: Skin is warm and dry.      Findings: Lesion and rash present.      Comments: See media for pictures from previous admission   1. Gluteal cleft/buttocks -denuded  and excoriated  2. Right buttock shingles lesions- no vesicles on right buttock and in different healing stage.   3. Right lateral side just below axillary small blisters less then eraser pencil size  4. Right foot ventral aspect skin tear without drainage at this time.      All stable/healing   Neurological:      Mental Status: He is alert and oriented to person, place, and time.   Psychiatric:         Thought Content: Thought content normal.             Significant Labs: All pertinent labs within the past 24 hours have been reviewed.    Significant Imaging: I have reviewed all pertinent imaging results/findings within the past 24 hours.

## 2023-08-07 NOTE — PLAN OF CARE
Problem: Adult Inpatient Plan of Care  Goal: Plan of Care Review  Outcome: Ongoing, Progressing  Flowsheets (Taken 8/7/2023 1732)  Plan of Care Reviewed With: patient  Goal: Patient-Specific Goal (Individualized)  Outcome: Ongoing, Progressing  Goal: Absence of Hospital-Acquired Illness or Injury  Outcome: Ongoing, Progressing  Intervention: Identify and Manage Fall Risk  Flowsheets (Taken 8/7/2023 1732)  Safety Promotion/Fall Prevention:   assistive device/personal item within reach   high risk medications identified   Fall Risk reviewed with patient/family   side rails raised x 2   nonskid shoes/socks when out of bed   instructed to call staff for mobility   room near unit station   medications reviewed   bed alarm set  Intervention: Prevent Skin Injury  Flowsheets (Taken 8/7/2023 1732)  Body Position:   turned   weight shifting  Skin Protection:   adhesive use limited   tubing/devices free from skin contact  Intervention: Prevent and Manage VTE (Venous Thromboembolism) Risk  Flowsheets (Taken 8/7/2023 1732)  Activity Management:   Ankle pumps - L1   Arm raise - L1   Rolling - L1   Straight leg raise - L1  VTE Prevention/Management:   ambulation promoted   bleeding precations maintained   bleeding risk assessed  Range of Motion: active ROM (range of motion) encouraged  Intervention: Prevent Infection  Flowsheets (Taken 8/7/2023 1732)  Infection Prevention: hand hygiene promoted  Goal: Optimal Comfort and Wellbeing  Outcome: Ongoing, Progressing  Goal: Readiness for Transition of Care  Outcome: Ongoing, Progressing     Problem: Infection  Goal: Absence of Infection Signs and Symptoms  Outcome: Ongoing, Progressing  Intervention: Prevent or Manage Infection  Flowsheets (Taken 8/7/2023 1732)  Infection Management: aseptic technique maintained     Problem: Diabetes Comorbidity  Goal: Blood Glucose Level Within Targeted Range  Outcome: Ongoing, Progressing  Intervention: Monitor and Manage Glycemia  Flowsheets (Taken  8/7/2023 1732)  Glycemic Management: blood glucose monitored     Problem: Impaired Wound Healing  Goal: Optimal Wound Healing  Outcome: Ongoing, Progressing  Intervention: Promote Wound Healing  Flowsheets (Taken 8/7/2023 1732)  Sleep/Rest Enhancement: relaxation techniques promoted  Activity Management:   Ankle pumps - L1   Arm raise - L1   Rolling - L1   Straight leg raise - L1  Pain Management Interventions:   pain management plan reviewed with patient/caregiver   relaxation techniques promoted   medication offered     Problem: Skin Injury Risk Increased  Goal: Skin Health and Integrity  Outcome: Ongoing, Progressing  Intervention: Optimize Skin Protection  Flowsheets (Taken 8/7/2023 1732)  Pressure Reduction Techniques:   frequent weight shift encouraged   positioned off wounds   heels elevated off bed   pressure points protected   weight shift assistance provided  Pressure Reduction Devices: positioning supports utilized  Skin Protection:   adhesive use limited   tubing/devices free from skin contact  Head of Bed (HOB) Positioning: HOB at 30-45 degrees   Pt alert able to make needs known,jose meds well,no s/s adverse reaction noted,reposition q 2hrs,pain controlled by prn pain medication,POC explained,remains free from falls and pressure injuries,safety maintained,continue monitoring.

## 2023-08-07 NOTE — ASSESSMENT & PLAN NOTE
-Admitted to inpatient status  -On admit had severe edema / anasarca - BLE up to hips and abdomen with edema inhibiting ambulation.  -Noted recent 2 D echo in June HFpEF.   -Strict ins/outs and daily weights  -Slowly improving and for first time seeing some wrinkling of skin on legs, but still with significant deep edema in legs. Still no sign of contraction alkalosis, but Cr has bumped up a bit.  -Consult wound care to evaluate for compression wrappings for his legs  -Continue lasix 60mg TID at least through today.  -Planning NH placement at discharge.

## 2023-08-07 NOTE — PLAN OF CARE
Case Management Re-assessment      PCP: [facility]  Pharmacy: [id number] - [place], [place] - [address]        Patient Arrived From: Home  Existing Help at Home: Jyoti [phone]      Barriers to Discharge: Financials for long term placement     Discharge Plan:               A. New [facility] placement               B. Home health     [employer] peer to peer denied for SNF. Plan for NH placement. [place] [facility]. Pt's Leigh nogueira assisting with financials and willing to assist with paperwork.    Pending review:   [facility]- left message  [facility]- no answer, will follow up  [facility]- left message  [facility]- no answer will follow up     Denied:  [facility]- unable to meet pt needs  [facility] - no long term bed available  [facility]- unable to meet pt needs    12:29pm Spoke with Sun with [facility], stated pending review. Placed call to [place], left detailed voice message. Referral emailed to Oswald with [facility].     Spoke with patient's niece Leigh, reviewed that patient has been accepted to [facility]. Explained facility choice and admission paperwork will be need to be completed for NH placement asa. Further discussed that when patient is medically clear for dc if placement is not set up, plan will be to return home with Home health. Notified Ms Abraham that per physician, pt is agreeable for NH placement.     3:19pm Received call from Yamilka with [facility], stated medically can accept patient and will reach out to patient's nimonica. TN to continue to follow for dc needs.    08/07/23 0844   Discharge Reassessment   Assessment Type Discharge Planning Reassessment   Did the patient's condition or plan change since previous assessment? No   Discharge Plan discussed with: Patient   Communicated ELY with patient/caregiver Date not available/Unable to determine   DME Needed Upon Discharge  none   Transition of  Care Barriers Social;Other (see comments)   Why the patient remains in the hospital Requires continued medical care   Post-Acute Status   Post-Acute Authorization Placement   Post-Acute Placement Status Pending medical clearance/testing   Discharge Delays None known at this time

## 2023-08-07 NOTE — ASSESSMENT & PLAN NOTE
-Prior to admission patient used a roller walker for ambulation at baseline.  -On admit patient unable to ambulate even with walker due to swelling of his legs and left knee pain  -Xray of knee showed no acute findings  -orthopedic surgery consulted and input appreciated.  Ortho note 7/30 reviewed - report he does not ambulate and pain is chronic - he uses wheelchair and bed.  Noted L knee effusion felt secondary to arthritis and no evidence of infection or need for aspiration.  -PT/OT consulted and input appreciated.  Recommended SNF which we attempted to get approved, but after peer to peer today that was denied by his insurance company - [employer].    -Discussed with patient 8/6 - states he prefers to go home with HH but not confident it is safe for him.  States he will think about it and discuss with his niece.  -8/7 patient states he would be unable to care for himself at home and is agreeable to NH placement.

## 2023-08-07 NOTE — PT/OT/SLP PROGRESS
Occupational Therapy      Patient Name:  Chato Bowie   MRN:  [id number]    Patient not seen today secondary to Patient unwilling to participate. Will follow-up when pt is able to participate.    8/7/2023

## 2023-08-07 NOTE — NURSING
[facility]  Nurses Note -- 4 Eyes      8/7/2023       Skin assessed on: Q Shift      [] No Pressure Injuries Present    []Prevention Measures Documented    [x] Yes LDA  for Pressure Injury Previously documented   Moisture associated   [] Yes New Pressure Injury Discovered   [] LDA for New Pressure Injury Added      Attending RN:  Mandi Watkins LPN     Second RN:  JOSLYN Mcgregor

## 2023-08-07 NOTE — ASSESSMENT & PLAN NOTE
-Hgb drop this admission and compared to last month hgb 10-11 range.   -No melena or hematochezia noted.  -3/2016 EGD normal esophagus and erythematous stomach and duodenum but no mention ulcers/erosions  -CKD IV also contributing   -Low iron and t stat elevated ferritin, normal vitamin b 12, low folic acid  -Held eliquis initially but now resumed.  -Received 1 unit PRBC and added PPI.  -Started folic acid, ferrous sulfate  -Hb now 8.2

## 2023-08-07 NOTE — PT/OT/SLP PROGRESS
Physical Therapy      Patient Name:  Chato Bowie   MRN:  [id number]    Patient not seen today secondary to Patient unwilling to participate, Other (Comment) (Pt adamently refused despite max encouragement). Will follow-up tomorrow.

## 2023-08-07 NOTE — PROGRESS NOTES
[facility] Medicine  Progress Note    Patient Name: Chato Bowie  MRN: [id number]  Patient Class: IP- Inpatient   Admission Date: 7/27/2023  Length of Stay: 8 days  Attending Physician: Martin Young MD  Primary Care Provider: Irlanda Valenzuela -        Subjective:     Principal Problem:Chronic diastolic heart failure        HPI:  Chato Bowie 72 y.o. male with CHF, CAD, DM, HTN, HLD, presents to the hospital with a chief complaint of BLE swelling and pain.  onset several weeks ago per chart review, patient was recently discharged from the ED yesterday for similar complaints, and was discharged from inpatient status on 07/25/2023. the patient had endorsed no relief to his complaints since discharge. Patient currently reports complaints of BLE swelling with associated pain. He states his complaints have been ongoing for a few weeks, but had worsened as of recent. He additionally reports he last had similar complaints a month ago, noting he had to be admitted for a couple of nights. He endorses compliance with antihypertensives and diuretics. He reports following with his PCP at [facility], but reports he does not know who is his cardiologist. Denies fever, shortness of breath, or cough.  Patient's niece states that the patient does not live in assisted living however he does need it as she can not provide care for him especially due to him being COVID positive.  No other alleviating or exacerbating factors noted.    In the ED patient was hypertensive (181/140) afebrile without leukocytosis, mild normocytic anemia noted, BUN 46, creatinine 2.7, EGFR 24, glucose 136, albumin 2.3, , TSH WNL, COVID positive, flu negative, UA showed +1 protein, +2 occult blood, CXR was negative for acute process.  Patient was placed in observation      Overview/Hospital Course:  Admission to hospital for BLE edema up hips due to acute on chronic diastolic heart failure and malnutrition. Patient also found  positive COVID 19 -asymptomatic on admission so Remdesivir discontinued. Edema improving with IV diuresis.  Became hypoxic night 7/29 requiring 2 L NC. Started Remdesivir/decadron (7/30), repeat CXR. Hypoglycemia (7/29) improved with oral intake and holding insulin.   He also have left  knee pain and edema similar to previous admission in June where Orthopedic surgery suspected gouty arthritis and attempted aspiration but only able to give steroid injection with improvement. No colchicine due to CKD. Left knee x ray on 7/17/23 showed significant suprapatellar effusion. Due to pain and decrease mobility, will ask Orthopedic Surgery to evaluate.  Hold apixaban  (last dose 7/29 2221).   Hgb treaded down 8.2>7.5>7.1>6.5 without over signs of bleeding. Had normal BM on 7/29 no melena or hematochezia. Previous iron studies low iron/t stat and elevated ferritin, normal vitamin b 12, and low folic acid. Start folic acid, ferrous sulfate and transfuse 1 unit of PRBC.   Recent admission 7/17-7/25 for sepsis due to shingles/cellulitis and ELIZABETH. Right buttock lesion from shingles are all in different part of healing phase without drainage. Also have gluteal cleft excoriation. Continue wound care as recommended by wound care team on previous admission-Cleanse both area with vashe, put mepilex to gluteal cleft, and put hydrogel to shingle lesion.   Also have right upper lateral back below axillary unroof blister and right foot ventral aspect skin tear. Keep clean and dry.     PT/OT evaluation completed. TN/SW to assist with SNF.               Interval History: No acute events overnight.  Notes discomfort in left knee.  Starting to see some wrinkling in skin on legs today!  Today he states he cannot care for himself and is agreeable to NH placement.  No further fevers.  All questions answered and patient had no further complaints.      Objective:     Vital Signs (Most Recent):  Temp: 98.1 °F (36.7 °C) (08/07/23 1102)  Pulse: 66  (08/07/23 1102)  Resp: 19 (08/07/23 1102)  BP: 127/62 (08/07/23 1102)  SpO2: 96 % (08/07/23 1102) Vital Signs (24h Range):  Temp:  [97.8 °F (36.6 °C)-98.8 °F (37.1 °C)] 98.1 °F (36.7 °C)  Pulse:  [60-70] 66  Resp:  [18-24] 19  SpO2:  [91 %-100 %] 96 %  BP: (124-146)/(58-66) 127/62     Weight: 98.9 kg (218 lb)  Body mass index is 30.4 kg/m².    Intake/Output Summary (Last 24 hours) at 8/7/2023 1314  Last data filed at 8/7/2023 1254  Gross per 24 hour   Intake 1060 ml   Output --   Net 1060 ml           Physical Exam  Vitals and nursing note reviewed.   Constitutional:       General: He is not in acute distress.     Appearance: He is well-developed. He is obese.   Cardiovascular:      Rate and Rhythm: Normal rate.   Pulmonary:      Effort: Pulmonary effort is normal. No respiratory distress.      Breath sounds: Normal breath sounds. No wheezing or rales.   Abdominal:      General: Bowel sounds are normal. There is no distension.      Palpations: Abdomen is soft.      Tenderness: There is no abdominal tenderness.   Musculoskeletal:         General: No tenderness. Normal range of motion.      Cervical back: Normal range of motion and neck supple.      Right lower leg: Edema present.      Left lower leg: Edema present.      Comments: Anasarca noted up to pelvis/abdomen  Feet/lower extremity edema improving but still with pitting edema extending up to abdomen   Skin:     General: Skin is warm and dry.      Findings: Lesion and rash present.      Comments: See media for pictures from previous admission   1. Gluteal cleft/buttocks -denuded and excoriated  2. Right buttock shingles lesions- no vesicles on right buttock and in different healing stage.   3. Right lateral side just below axillary small blisters less then eraser pencil size  4. Right foot ventral aspect skin tear without drainage at this time.      All stable/healing   Neurological:      Mental Status: He is alert and oriented to person, place, and time.    Psychiatric:         Thought Content: Thought content normal.             Significant Labs: All pertinent labs within the past 24 hours have been reviewed.    Significant Imaging: I have reviewed all pertinent imaging results/findings within the past 24 hours.          Assessment/Plan:      * acute on chronic diastolic heart failure  -Admitted to inpatient status  -On admit had severe edema / anasarca - BLE up to hips and abdomen with edema inhibiting ambulation.  -Noted recent 2 D echo in June HFpEF.   -Strict ins/outs and daily weights  -Slowly improving and for first time seeing some wrinkling of skin on legs, but still with significant deep edema in legs. Still no sign of contraction alkalosis, but Cr has bumped up a bit.  -Consult wound care to evaluate for compression wrappings for his legs  -Continue lasix 60mg TID at least through today.  -Planning NH placement at discharge.    Anasarca  -Due to HFpEF, CKD, malnutrition, COVID 19 infection and immobility- addressing these issues separately.     CKD (chronic kidney disease), stage IV  -Baseline Cr 2.7-3.0.  -Cr now 2.5.  Still no evidence of contraction alkalosis  -Remains fluid overloaded with edema, but is improving  -Avoid nephrotoxic agents and renally dose meds  -Diurese as above  -Repeat BMP in AM    COVID-19 virus infection  -Initially asymptomatic now with non productive cough, 7/29 night 89% improve with O2 2 LNC  -CXR showed increase opacities and small pleural effusions  -Treated with dexamethasone, remdsivir, prn tessalon, albuterol inhaler bid and incentive spirometry  -Stopped dexamethasone due to hyperglycemia and he has completed remdesivir  -Clinically his covid infection has resolved and he is breathing comfortably on room air and symptom free  -Continue in covid isolation    Alcohol abuse  -Patient has a history of alcohol abuse however denies any recent used in the past week.    -No signs of DT this admission     Effusion of left  knee  -X-ray on 07/17 showed significant suprapatellar effusion.  Previous admission in June orthopedic surgery attempted aspiration but no fluid then injected steroid with improvement in pain  -Uric acid remains elevated at 10.5, but notably improved from prior measurements this year  -No colchicine secondary to CKD  -Consulted orthopedic surgery and input appreciated - no intervention at this time  -Today no complaints of pain.  -Added allopurinol 8/3  -Adding lidoderm patches    Multiple wounds  1. Gluteal cleft - excoriation- Wound care consult from previous admission few days ago-   2. Right buttock lesion from shingles healing at different stages - no vesicles  3. Right foot ventral aspect skin tear- clean with vashe and keep clean JASON  4. Right lateral back below armput few less then eraser size blister. - keep clean dry  -Wound care consulted and input appreciated  -Cleanse with vashe and put hydrogel to right buttock lesions from shingles     Debility Due to left knee pain and effusion due to gout? and/or injury  -Prior to admission patient used a roller walker for ambulation at baseline.  -On admit patient unable to ambulate even with walker due to swelling of his legs and left knee pain  -Xray of knee showed no acute findings  -orthopedic surgery consulted and input appreciated.  Ortho note 7/30 reviewed - report he does not ambulate and pain is chronic - he uses wheelchair and bed.  Noted L knee effusion felt secondary to arthritis and no evidence of infection or need for aspiration.  -PT/OT consulted and input appreciated.  Recommended SNF which we attempted to get approved, but after peer to peer today that was denied by his insurance company - [employer].    -Discussed with patient 8/6 - states he prefers to go home with HH but not confident it is safe for him.  States he will think about it and discuss with his niece.  -8/7 patient states he would be unable to care for himself at home and is agreeable to  NH placement.    Severe malnutrition  -Added protein supplementations with meals    Paroxysmal atrial flutter  -Stable  -Continue amiodarone, metoprolol and eliquis    BPH (benign prostatic hyperplasia)  -Chronic, stable, continue Flomax    Anemia  -Hgb drop this admission and compared to last month hgb 10-11 range.   -No melena or hematochezia noted.  -3/2016 EGD normal esophagus and erythematous stomach and duodenum but no mention ulcers/erosions  -CKD IV also contributing   -Low iron and t stat elevated ferritin, normal vitamin b 12, low folic acid  -Held eliquis initially but now resumed.  -Received 1 unit PRBC and added PPI.  -Started folic acid, ferrous sulfate  -Hb now 8.2    Type 2 diabetes mellitus with kidney complication, with long-term current use of insulin  -A1c 7.8  -Noted significant hyperglycemia earlier in stay   -Improved but with persistent morning hypoglycemia down to 70s on morning of 8/4, 54 on 8/5 and 62 today  -Stop scheduled aspart.  Change detemir to 7 units bid for now.  Continue SSI ac/hs    Dyslipidemia  -Chronic, stable, substitute for atorvastatin while in the hospital    Essential hypertension  -BP well controlled  -Continue home antihypertensives-amlodipine, hydralazine, metoprolol with parameters  -Prn hydralazine SBP >180      VTE Risk Mitigation (From admission, onward)         Ordered     apixaban tablet 5 mg  2 times daily         08/01/23 1648     Place MASOOD hose  Until discontinued         07/28/23 0719     IP VTE HIGH RISK PATIENT  Once         07/27/23 2326     Place sequential compression device  Until discontinued         07/27/23 2326     Reason for No Pharmacological VTE Prophylaxis  Once        Question:  Reasons:  Answer:  Already adequately anticoagulated on oral Anticoagulants    07/27/23 2326                Discharge Planning   ELY: 8/9/2023     Code Status: Full Code   Is the patient medically ready for discharge?:     Reason for patient still in hospital (select  all that apply): Treatment and Pending disposition  Discharge Plan A: Skilled Nursing Facility   Discharge Delays: None known at this time              Martin Young MD  Department of Hospital Medicine   [facility]

## 2023-08-07 NOTE — ASSESSMENT & PLAN NOTE
-X-ray on 07/17 showed significant suprapatellar effusion.  Previous admission in June orthopedic surgery attempted aspiration but no fluid then injected steroid with improvement in pain  -Uric acid remains elevated at 10.5, but notably improved from prior measurements this year  -No colchicine secondary to CKD  -Consulted orthopedic surgery and input appreciated - no intervention at this time  -Today no complaints of pain.  -Added allopurinol 8/3  -Adding lidoderm patches

## 2023-08-07 NOTE — ASSESSMENT & PLAN NOTE
-Baseline Cr 2.7-3.0.  -Cr now 2.5.  Still no evidence of contraction alkalosis  -Remains fluid overloaded with edema, but is improving  -Avoid nephrotoxic agents and renally dose meds  -Diurese as above  -Repeat BMP in AM

## 2023-08-07 NOTE — ASSESSMENT & PLAN NOTE
-A1c 7.8  -Noted significant hyperglycemia earlier in stay   -Improved but with persistent morning hypoglycemia down to 70s on morning of 8/4, 54 on 8/5 and 62 today  -Stop scheduled aspart.  Change detemir to 7 units bid for now.  Continue SSI ac/hs

## 2023-08-08 LAB
ANION GAP SERPL CALC-SCNC: 12 MMOL/L (ref 8–16)
BUN SERPL-MCNC: 86 MG/DL (ref 8–23)
CALCIUM SERPL-MCNC: 8.4 MG/DL (ref 8.7–10.5)
CHLORIDE SERPL-SCNC: 102 MMOL/L (ref 95–110)
CO2 SERPL-SCNC: 23 MMOL/L (ref 23–29)
CREAT SERPL-MCNC: 2.5 MG/DL (ref 0.5–1.4)
EST. GFR  (NO RACE VARIABLE): 27 ML/MIN/1.73 M^2
GLUCOSE SERPL-MCNC: 63 MG/DL (ref 70–110)
MAGNESIUM SERPL-MCNC: 1.9 MG/DL (ref 1.6–2.6)
POCT GLUCOSE: 111 MG/DL (ref 70–110)
POCT GLUCOSE: 115 MG/DL (ref 70–110)
POCT GLUCOSE: 126 MG/DL (ref 70–110)
POCT GLUCOSE: 66 MG/DL (ref 70–110)
POCT GLUCOSE: 88 MG/DL (ref 70–110)
POTASSIUM SERPL-SCNC: 3.7 MMOL/L (ref 3.5–5.1)
SODIUM SERPL-SCNC: 137 MMOL/L (ref 136–145)

## 2023-08-08 PROCEDURE — 25000242 PHARM REV CODE 250 ALT 637 W/ HCPCS: Performed by: NURSE PRACTITIONER

## 2023-08-08 PROCEDURE — 63600175 PHARM REV CODE 636 W HCPCS: Performed by: HOSPITALIST

## 2023-08-08 PROCEDURE — 25000003 PHARM REV CODE 250

## 2023-08-08 PROCEDURE — 97530 THERAPEUTIC ACTIVITIES: CPT

## 2023-08-08 PROCEDURE — 94760 N-INVAS EAR/PLS OXIMETRY 1: CPT

## 2023-08-08 PROCEDURE — 97535 SELF CARE MNGMENT TRAINING: CPT

## 2023-08-08 PROCEDURE — 25000003 PHARM REV CODE 250: Performed by: STUDENT IN AN ORGANIZED HEALTH CARE EDUCATION/TRAINING PROGRAM

## 2023-08-08 PROCEDURE — 11000001 HC ACUTE MED/SURG PRIVATE ROOM

## 2023-08-08 PROCEDURE — 94640 AIRWAY INHALATION TREATMENT: CPT

## 2023-08-08 PROCEDURE — 36415 COLL VENOUS BLD VENIPUNCTURE: CPT | Performed by: HOSPITALIST

## 2023-08-08 PROCEDURE — 25000003 PHARM REV CODE 250: Performed by: NURSE PRACTITIONER

## 2023-08-08 PROCEDURE — 27000207 HC ISOLATION

## 2023-08-08 PROCEDURE — 99900035 HC TECH TIME PER 15 MIN (STAT)

## 2023-08-08 PROCEDURE — 25000003 PHARM REV CODE 250: Performed by: HOSPITALIST

## 2023-08-08 PROCEDURE — 80048 BASIC METABOLIC PNL TOTAL CA: CPT | Performed by: HOSPITALIST

## 2023-08-08 PROCEDURE — 83735 ASSAY OF MAGNESIUM: CPT | Performed by: HOSPITALIST

## 2023-08-08 PROCEDURE — 97110 THERAPEUTIC EXERCISES: CPT

## 2023-08-08 RX ADMIN — APIXABAN 5 MG: 5 TABLET, FILM COATED ORAL at 09:08

## 2023-08-08 RX ADMIN — FUROSEMIDE 60 MG: 10 INJECTION, SOLUTION INTRAMUSCULAR; INTRAVENOUS at 02:08

## 2023-08-08 RX ADMIN — ATORVASTATIN CALCIUM 80 MG: 40 TABLET, FILM COATED ORAL at 08:08

## 2023-08-08 RX ADMIN — ALLOPURINOL 100 MG: 100 TABLET ORAL at 08:08

## 2023-08-08 RX ADMIN — LIDOCAINE 5% 2 PATCH: 700 PATCH TOPICAL at 05:08

## 2023-08-08 RX ADMIN — CAPSAICIN: 0.25 CREAM TOPICAL at 08:08

## 2023-08-08 RX ADMIN — AMIODARONE HYDROCHLORIDE 200 MG: 200 TABLET ORAL at 08:08

## 2023-08-08 RX ADMIN — BENZONATATE 100 MG: 100 CAPSULE ORAL at 07:08

## 2023-08-08 RX ADMIN — OXYCODONE HYDROCHLORIDE AND ACETAMINOPHEN 500 MG: 500 TABLET ORAL at 08:08

## 2023-08-08 RX ADMIN — CAPSAICIN: 0.25 CREAM TOPICAL at 09:08

## 2023-08-08 RX ADMIN — INSULIN DETEMIR 7 UNITS: 100 INJECTION, SOLUTION SUBCUTANEOUS at 09:08

## 2023-08-08 RX ADMIN — APIXABAN 5 MG: 5 TABLET, FILM COATED ORAL at 08:08

## 2023-08-08 RX ADMIN — GABAPENTIN 100 MG: 100 CAPSULE ORAL at 09:08

## 2023-08-08 RX ADMIN — FUROSEMIDE 60 MG: 10 INJECTION, SOLUTION INTRAMUSCULAR; INTRAVENOUS at 09:08

## 2023-08-08 RX ADMIN — OXYCODONE HYDROCHLORIDE AND ACETAMINOPHEN 500 MG: 500 TABLET ORAL at 09:08

## 2023-08-08 RX ADMIN — ALBUTEROL SULFATE 2.5 MG: 2.5 SOLUTION RESPIRATORY (INHALATION) at 11:08

## 2023-08-08 RX ADMIN — GABAPENTIN 100 MG: 100 CAPSULE ORAL at 08:08

## 2023-08-08 RX ADMIN — TAMSULOSIN HYDROCHLORIDE 0.4 MG: 0.4 CAPSULE ORAL at 08:08

## 2023-08-08 RX ADMIN — BENZONATATE 100 MG: 100 CAPSULE ORAL at 05:08

## 2023-08-08 RX ADMIN — FUROSEMIDE 60 MG: 10 INJECTION, SOLUTION INTRAMUSCULAR; INTRAVENOUS at 06:08

## 2023-08-08 RX ADMIN — METOPROLOL SUCCINATE 25 MG: 25 TABLET, EXTENDED RELEASE ORAL at 08:08

## 2023-08-08 RX ADMIN — THERA TABS 1 TABLET: TAB at 08:08

## 2023-08-08 RX ADMIN — FOLIC ACID 1 MG: 1 TABLET ORAL at 08:08

## 2023-08-08 RX ADMIN — AMLODIPINE BESYLATE 10 MG: 5 TABLET ORAL at 08:08

## 2023-08-08 RX ADMIN — HYDROCODONE BITARTRATE AND ACETAMINOPHEN 1 TABLET: 5; 325 TABLET ORAL at 09:08

## 2023-08-08 RX ADMIN — HYDROCODONE BITARTRATE AND ACETAMINOPHEN 1 TABLET: 5; 325 TABLET ORAL at 05:08

## 2023-08-08 RX ADMIN — Medication 16 G: at 07:08

## 2023-08-08 NOTE — PLAN OF CARE
[facility]. Per Ded[facility] with [facility], patient medically accepted and pt's niece plans to complete paperwork at the facility tomorrow.     Spoke with patient regarding discharge to [facility]. Pt verbalized understanding and is agreeable for NH placement. Explained that financials will need to be provided and income will be provided to the facility to cover expense at the facility, pt verbalized understanding.     Spoke with pt's niece, Leigh confirmed will meet with [facility] on tomorrow. TN to continue to follow.    08/08/23 1136   Post-Acute Status   Post-Acute Authorization Placement   Post-Acute Placement Status Pending post-acute provider review/more information requested   Discharge Plan   Discharge Plan A Skilled Nursing Facility   Discharge Plan B Home Health

## 2023-08-08 NOTE — PROGRESS NOTES
[facility] Medicine  Progress Note    Patient Name: Chato Bowie  MRN: [id number]  Patient Class: IP- Inpatient   Admission Date: 7/27/2023  Length of Stay: 9 days  Attending Physician: Jose L Evans III, MD  Primary Care Provider: Irlanda Valenzuela -        Subjective:     Principal Problem:Chronic diastolic heart failure        HPI:  Chato Bowie 72 y.o. male with CHF, CAD, DM, HTN, HLD, presents to the hospital with a chief complaint of BLE swelling and pain.  onset several weeks ago per chart review, patient was recently discharged from the ED yesterday for similar complaints, and was discharged from inpatient status on 07/25/2023. the patient had endorsed no relief to his complaints since discharge. Patient currently reports complaints of BLE swelling with associated pain. He states his complaints have been ongoing for a few weeks, but had worsened as of recent. He additionally reports he last had similar complaints a month ago, noting he had to be admitted for a couple of nights. He endorses compliance with antihypertensives and diuretics. He reports following with his PCP at [facility], but reports he does not know who is his cardiologist. Denies fever, shortness of breath, or cough.  Patient's niece states that the patient does not live in assisted living however he does need it as she can not provide care for him especially due to him being COVID positive.  No other alleviating or exacerbating factors noted.    In the ED patient was hypertensive (181/140) afebrile without leukocytosis, mild normocytic anemia noted, BUN 46, creatinine 2.7, EGFR 24, glucose 136, albumin 2.3, , TSH WNL, COVID positive, flu negative, UA showed +1 protein, +2 occult blood, CXR was negative for acute process.  Patient was placed in observation      Overview/Hospital Course:  Admission to hospital for BLE edema up hips due to acute on chronic diastolic heart failure and malnutrition. Patient also found  positive COVID 19 -asymptomatic on admission so Remdesivir discontinued. Edema improving with IV diuresis.  Became hypoxic night 7/29 requiring 2 L NC. Started Remdesivir/decadron (7/30), repeat CXR. Hypoglycemia (7/29) improved with oral intake and holding insulin.   He also have left  knee pain and edema similar to previous admission in June where Orthopedic surgery suspected gouty arthritis and attempted aspiration but only able to give steroid injection with improvement. No colchicine due to CKD. Left knee x ray on 7/17/23 showed significant suprapatellar effusion. Due to pain and decrease mobility, will ask Orthopedic Surgery to evaluate.  Hold apixaban  (last dose 7/29 2221).   Hgb treaded down 8.2>7.5>7.1>6.5 without over signs of bleeding. Had normal BM on 7/29 no melena or hematochezia. Previous iron studies low iron/t stat and elevated ferritin, normal vitamin b 12, and low folic acid. Start folic acid, ferrous sulfate and transfuse 1 unit of PRBC.   Recent admission 7/17-7/25 for sepsis due to shingles/cellulitis and ELIZABETH. Right buttock lesion from shingles are all in different part of healing phase without drainage. Also have gluteal cleft excoriation. Continue wound care as recommended by wound care team on previous admission-Cleanse both area with vashe, put mepilex to gluteal cleft, and put hydrogel to shingle lesion.   Also have right upper lateral back below axillary unroof blister and right foot ventral aspect skin tear. Keep clean and dry.     PT/OT evaluation completed. TN/SW to assist with SNF.               Interval History: Pt states he knows he cannot take care of himself at home and is open to nursing home placement.     Review of Systems  Objective:     Vital Signs (Most Recent):  Temp: 98.1 °F (36.7 °C) (08/08/23 1136)  Pulse: 68 (08/08/23 1136)  Resp: 19 (08/08/23 1136)  BP: (!) 151/67 (08/08/23 1136)  SpO2: 96 % (08/08/23 1136) Vital Signs (24h Range):  Temp:  [98 °F (36.7 °C)-99.1 °F  (37.3 °C)] 98.1 °F (36.7 °C)  Pulse:  [62-68] 68  Resp:  [19-22] 19  SpO2:  [94 %-97 %] 96 %  BP: (125-151)/(58-67) 151/67     Weight: 98.9 kg (218 lb)  Body mass index is 30.4 kg/m².    Intake/Output Summary (Last 24 hours) at 8/8/2023 1508  Last data filed at 8/8/2023 0839  Gross per 24 hour   Intake 480 ml   Output 900 ml   Net -420 ml         Physical Exam      Vitals and nursing note reviewed.   Constitutional:       General: He is not in acute distress.     Appearance: He is well-developed. He is obese.   Cardiovascular:      Rate and Rhythm: Normal rate.   Pulmonary:      Effort: Pulmonary effort is normal. No respiratory distress.      Breath sounds: Normal breath sounds. No wheezing or rales.   Abdominal:      General: Bowel sounds are normal. There is no distension.      Palpations: Abdomen is soft.      Tenderness: There is no abdominal tenderness.   Musculoskeletal:         General: No tenderness. Normal range of motion.      Cervical back: Normal range of motion and neck supple.      Right lower leg: Edema present.      Left lower leg: Edema present.      Comments: Anasarca noted up to pelvis/abdomen  Feet/lower extremity edema improving but still with pitting edema extending up to abdomen   Skin:     General: Skin is warm and dry.   Significant Labs: All pertinent labs within the past 24 hours have been reviewed.    Significant Imaging: I have reviewed all pertinent imaging results/findings within the past 24 hours.      Assessment/Plan:      * acute on chronic diastolic heart failure  -Admitted to inpatient status  -On admit had severe edema / anasarca - BLE up to hips and abdomen with edema inhibiting ambulation.  -Noted recent 2 D echo in June HFpEF.   -Strict ins/outs and daily weights  -Slowly improving and for first time seeing some wrinkling of skin on legs, but still with significant deep edema in legs. Still no sign of contraction alkalosis, but Cr has bumped up a bit.  -Consult wound care to  evaluate for compression wrappings for his legs  -Continue lasix 60mg TID at least through today.  -Planning NH placement at discharge.    Effusion of left knee  -X-ray on 07/17 showed significant suprapatellar effusion.  Previous admission in June orthopedic surgery attempted aspiration but no fluid then injected steroid with improvement in pain  -Uric acid remains elevated at 10.5, but notably improved from prior measurements this year  -No colchicine secondary to CKD  -Consulted orthopedic surgery and input appreciated - no intervention at this time  -Today no complaints of pain.  -Added allopurinol 8/3  -Adding lidoderm patches    Multiple wounds  1. Gluteal cleft - excoriation- Wound care consult from previous admission few days ago-   2. Right buttock lesion from shingles healing at different stages - no vesicles  3. Right foot ventral aspect skin tear- clean with vashe and keep clean JASON  4. Right lateral back below armput few less then eraser size blister. - keep clean dry  -Wound care consulted and input appreciated  -Cleanse with vashe and put hydrogel to right buttock lesions from shingles     COVID-19 virus infection  -Initially asymptomatic now with non productive cough, 7/29 night 89% improve with O2 2 LNC  -CXR showed increase opacities and small pleural effusions  -Treated with dexamethasone, remdsivir, prn tessalon, albuterol inhaler bid and incentive spirometry  -Stopped dexamethasone due to hyperglycemia and he has completed remdesivir  -Clinically his covid infection has resolved and he is breathing comfortably on room air and symptom free  -Continue in covid isolation    Debility Due to left knee pain and effusion due to gout? and/or injury  -Prior to admission patient used a roller walker for ambulation at baseline.  -On admit patient unable to ambulate even with walker due to swelling of his legs and left knee pain  -Xray of knee showed no acute findings  -orthopedic surgery consulted and  input appreciated.  Ortho note 7/30 reviewed - report he does not ambulate and pain is chronic - he uses wheelchair and bed.  Noted L knee effusion felt secondary to arthritis and no evidence of infection or need for aspiration.  -PT/OT consulted and input appreciated.  Recommended SNF which we attempted to get approved, but after peer to peer today that was denied by his insurance company - [employer].    -Discussed with patient 8/6 - states he prefers to go home with HH but not confident it is safe for him.  States he will think about it and discuss with his niece.  -8/7 patient states he would be unable to care for himself at home and is agreeable to NH placement.    Severe malnutrition  -Added protein supplementations with meals    Anasarca  -Due to HFpEF, CKD, malnutrition, COVID 19 infection and immobility- addressing these issues separately.     CKD (chronic kidney disease), stage IV  -Baseline Cr 2.7-3.0.  -Cr now 2.5.  Still no evidence of contraction alkalosis  -Remains fluid overloaded with edema, but is improving  -Avoid nephrotoxic agents and renally dose meds  -Diurese as above  -Repeat BMP in AM    Paroxysmal atrial flutter  -Stable  -Continue amiodarone, metoprolol and eliquis    BPH (benign prostatic hyperplasia)  -Chronic, stable, continue Flomax    Alcohol abuse  -Patient has a history of alcohol abuse however denies any recent used in the past week.    -No signs of DT this admission     Anemia  -Hgb drop this admission and compared to last month hgb 10-11 range.   -No melena or hematochezia noted.  -3/2016 EGD normal esophagus and erythematous stomach and duodenum but no mention ulcers/erosions  -CKD IV also contributing   -Low iron and t stat elevated ferritin, normal vitamin b 12, low folic acid  -Held eliquis initially but now resumed.  -Received 1 unit PRBC and added PPI.  -Started folic acid, ferrous sulfate  -Hb now 8.2    Type 2 diabetes mellitus with kidney complication, with long-term  current use of insulin  -A1c 7.8  -Noted significant hyperglycemia earlier in stay   -Improved but with persistent morning hypoglycemia down to 70s on morning of 8/4, 54 on 8/5 and 62 today  -Stop scheduled aspart.  Change detemir to 7 units bid for now.  Continue SSI ac/hs    Dyslipidemia  -Chronic, stable, substitute for atorvastatin while in the hospital    Essential hypertension  -BP well controlled  -Continue home antihypertensives-amlodipine, hydralazine, metoprolol with parameters  -Prn hydralazine SBP >180      VTE Risk Mitigation (From admission, onward)         Ordered     apixaban tablet 5 mg  2 times daily         08/01/23 1648     Place MASOOD hose  Until discontinued         07/28/23 0719     IP VTE HIGH RISK PATIENT  Once         07/27/23 2326     Place sequential compression device  Until discontinued         07/27/23 2326     Reason for No Pharmacological VTE Prophylaxis  Once        Question:  Reasons:  Answer:  Already adequately anticoagulated on oral Anticoagulants    07/27/23 2326                Discharge Planning   ELY: 8/9/2023     Code Status: Full Code   Is the patient medically ready for discharge?:     Reason for patient still in hospital (select all that apply): Pending disposition  Discharge Plan A: Skilled Nursing Facility   Discharge Delays: None known at this time              Jose L Evans III, MD  Department of Hospital Medicine   [facility] Surg

## 2023-08-08 NOTE — PT/OT/SLP PROGRESS
Occupational Therapy   Treatment    Name: Chato Bowie  MRN: [id number]  Admitting Diagnosis:  Chronic diastolic heart failure       Recommendations:     Discharge Recommendations: nursing facility, skilled  Discharge Equipment Recommendations:  none  Barriers to discharge:  Other (Comment) (pt. at high risk for falls and readmission due to inability to care for himself at home)    Assessment:     Chato Bowie is a 72 y.o. male with a medical diagnosis of Chronic diastolic heart failure.  He presents with up in bed with HOB elevated with persistent coughing and reminders to cover mouth while coughing. Patient had increased edema of B lower extremity with no pain patch noted on left knee.  Performance deficits affecting function are weakness, impaired endurance, impaired sensation, impaired self care skills, impaired functional mobility, gait instability, impaired balance, impaired cognition, decreased coordination, decreased lower extremity function, decreased safety awareness, pain, decreased ROM, impaired skin, edema, impaired cardiopulmonary response to activity.     Rehab Prognosis:  Good; patient would benefit from acute skilled OT services to address these deficits and reach maximum level of function.       Plan:     Patient to be seen 5 x/week to address the above listed problems via self-care/home management, therapeutic activities, therapeutic exercises  Plan of Care Expires: 08/18/23  Plan of Care Reviewed with: patient    Subjective     Chief Complaint: left knee pain   Patient/Family Comments/goals: NH placement with niece's assistance   Pain/Comfort:  Pain Rating 1: 0/10 (L LE pain)  Location - Side 1: Left  Location - Orientation 1: lower  Location 1: knee  Pain Addressed 1: Reposition    Objective:     Communicated with: Annmarie RN,  prior to session.  Patient found HOB elevated with PureWick, telemetry, pressure relief boots upon OT entry to room.    General Precautions: Standard, droplet, airborne,  contact, fall, diabetic, respiratory    Orthopedic Precautions:N/A  Braces: N/A  Respiratory Status: Room air     Occupational Performance:     Bed Mobility:    Patient completed Rolling/Turning to Left with  maximal assistance  Patient completed Scooting/Bridging with maximal assistance  Patient completed Supine to Sit with maximal assistance     Functional Mobility/Transfers:  Patient completed Sit <> Stand Transfer with maximal assistance  with  rolling walker   Patient completed Bed <> Chair Transfer using Step Transfer technique with maximal assistance with rolling walker  Functional Mobility: Patient took a few 4-5 side steps to Left side to reach chair from bed with RW and SBA.     Activities of Daily Living:  Grooming: stand by assistance seated in chair to brush teeth   LB dressing: patient needed total assistance to don socks today       [facility] 6 Click ADL: 15    Treatment & Education:  Patient completed 10 spirometry exercises with spirometer while seated in chair with setup  Patient educated and reminded to cover cough at all times  Patient educated to continue with OOB mobiity.     Patient left up in chair with all lines intact, call button in reach, and RN  notified    GOALS:   Multidisciplinary Problems       Occupational Therapy Goals          Problem: Occupational Therapy    Goal Priority Disciplines Outcome Interventions   Occupational Therapy Goal     OT, PT/OT Ongoing, Progressing    Description: Goals to be met by: 8/18/23     Patient will increase functional independence with ADLs by performing:    UE Dressing with Jerome.  LE Dressing with Modified Jerome.  Grooming while seated at sink with Jerome.  Toileting from toilet with Modified Jerome for hygiene and clothing management.   Bathing from  edge of bed with Minimal Assistance.  Toilet transfer to toilet with Modified Jerome.  Upper extremity exercise program x10-15 reps per handout, with  supervision.      Patient seen by occupational therapy for initial evaluation, so see notes for more info. Patient is having difficulty taking care of himself at home, so needs SNF care. Occupational therapy to see 5x/week.                        Time Tracking:     OT Date of Treatment: 08/08/23  OT Start Time: 1342  OT Stop Time: 1415  OT Total Time (min): 33 min    Billable Minutes:Self Care/Home Management 18  Therapeutic Activity 15     OT/JASON: OT       Co-treat with PT     8/8/2023

## 2023-08-08 NOTE — ASSESSMENT & PLAN NOTE
-Prior to admission patient used a roller walker for ambulation at baseline.  -On admit patient unable to ambulate even with walker due to swelling of his legs and left knee pain  -Xray of knee showed no acute findings  -orthopedic surgery consulted and input appreciated.  Ortho note 7/30 reviewed - report he does not ambulate and pain is chronic - he uses wheelchair and bed.  Noted L knee effusion felt secondary to arthritis and no evidence of infection or need for aspiration.  -PT/OT consulted and input appreciated.  Recommended SNF which we attempted to get approved, but after peer to peer today that was denied by his insurance company - [employer].    -Discussed with patient 8/6 - states he prefers to go home with HH but not confident it is safe for him.  States he will think about it and discuss with his niece.  -8/7 patient states he would be unable to care for himself at home and is agreeable to NH placement.

## 2023-08-08 NOTE — PT/OT/SLP PROGRESS
Physical Therapy Treatment    Patient Name:  Chato Bowie   MRN:  [id number]    Recommendations:     Discharge Recommendations: nursing facility, skilled  Discharge Equipment Recommendations: none      Assessment:     Chato Bowie is a 72 y.o. male admitted with a medical diagnosis of Chronic diastolic heart failure.  He presents with the following impairments/functional limitations: weakness, impaired endurance, impaired functional mobility, gait instability, decreased lower extremity function, edema.    Rehab Prognosis: Fair; patient would benefit from acute skilled PT services to address these deficits and reach maximum level of function.    Recent Surgery: * No surgery found *      Plan:     During this hospitalization, patient to be seen 3 x/week to address the identified rehab impairments via gait training, therapeutic activities, therapeutic exercises and progress toward the following goals:    Plan of Care Expires:  08/15/23    Subjective     Chief Complaint: I can eat, I keep coughing...  Patient/Family Comments/goals: Pt agreeable to therapy treatment.  Pain/Comfort:  Pain Rating 1:  (c/o persistent coughing)      Objective:     Communicated with nurseRegina prior to session.  Patient found supine with PureWick upon PT entry to room.     General Precautions: Standard, fall  Orthopedic Precautions: N/A  Braces: N/A  Respiratory Status: Room air     Functional Mobility:  Bed Mobility:     Supine to Sit: minimum assistance  Transfers:     Sit to Stand:  moderate assistance and of 2 persons with rolling walker  Gait: A few sidesteps alongside of bed to B/S chair with RW and min A      AM-PAC 6 CLICK MOBILITY  Turning over in bed (including adjusting bedclothes, sheets and blankets)?: 3  Sitting down on and standing up from a chair with arms (e.g., wheelchair, bedside commode, etc.): 2  Moving from lying on back to sitting on the side of the bed?: 3  Moving to and from a bed to a chair (including a  wheelchair)?: 3  Need to walk in hospital room?: 2  Climbing 3-5 steps with a railing?: 1  Basic Mobility Total Score: 14       Treatment & Education:  Pt educated on importance of mobility and cooperating with PT to increase strength and endurance.  Performed 1x10 reps BLE exs seated including APs, LAQ and marching.    Patient left  reclined in chair  with all lines intact, call button in reach, Marie, OT present, and all needs in reach .    GOALS:   Multidisciplinary Problems       Physical Therapy Goals          Problem: Physical Therapy    Goal Priority Disciplines Outcome Goal Variances Interventions   Physical Therapy Goal     PT, PT/OT Ongoing, Progressing     Description: Goals to be met by: 23     Patient will increase functional independence with mobility by performin. Pt to be min A with bed mobility.  2. Pt to transfer with min A.  3. Pt to be able to tolerate standing with RW EOB; Pt to ambulate 15' w/RW min A.  4. Pt to be (I) with written HEP.                         Time Tracking:     PT Received On: 23  PT Start Time: 1342     PT Stop Time: 1405  PT Total Time (min): 23 min     Billable Minutes: Therapeutic Activity 13 and Therapeutic Exercise 10    Treatment Type: Treatment  PT/PTA: PT     Number of PTA visits since last PT visit: 2023

## 2023-08-08 NOTE — PLAN OF CARE
Problem: Adult Inpatient Plan of Care  Goal: Plan of Care Review  Outcome: Ongoing, Progressing  Flowsheets (Taken 8/8/2023 7216)  Plan of Care Reviewed With: patient  Goal: Patient-Specific Goal (Individualized)  Outcome: Ongoing, Progressing  Goal: Absence of Hospital-Acquired Illness or Injury  Outcome: Ongoing, Progressing  Goal: Optimal Comfort and Wellbeing  Outcome: Ongoing, Progressing  Goal: Readiness for Transition of Care  Outcome: Ongoing, Progressing     Problem: Infection  Goal: Absence of Infection Signs and Symptoms  Outcome: Ongoing, Progressing     Problem: Diabetes Comorbidity  Goal: Blood Glucose Level Within Targeted Range  Outcome: Ongoing, Progressing     Problem: Adjustment to Illness (Sepsis/Septic Shock)  Goal: Optimal Coping  Outcome: Ongoing, Progressing     Problem: Bleeding (Sepsis/Septic Shock)  Goal: Absence of Bleeding  Outcome: Ongoing, Progressing     Problem: Glycemic Control Impaired (Sepsis/Septic Shock)  Goal: Blood Glucose Level Within Desired Range  Outcome: Ongoing, Progressing     Problem: Infection Progression (Sepsis/Septic Shock)  Goal: Absence of Infection Signs and Symptoms  Outcome: Ongoing, Progressing     Problem: Nutrition Impaired (Sepsis/Septic Shock)  Goal: Optimal Nutrition Intake  Outcome: Ongoing, Progressing     Problem: Fluid and Electrolyte Imbalance (Acute Kidney Injury/Impairment)  Goal: Fluid and Electrolyte Balance  Outcome: Ongoing, Progressing     Problem: Oral Intake Inadequate (Acute Kidney Injury/Impairment)  Goal: Optimal Nutrition Intake  Outcome: Ongoing, Progressing     Problem: Renal Function Impairment (Acute Kidney Injury/Impairment)  Goal: Effective Renal Function  Outcome: Ongoing, Progressing     Problem: Impaired Wound Healing  Goal: Optimal Wound Healing  Outcome: Ongoing, Progressing     Problem: Skin Injury Risk Increased  Goal: Skin Health and Integrity  Outcome: Ongoing, Progressing

## 2023-08-08 NOTE — SUBJECTIVE & OBJECTIVE
Interval History: Pt states he knows he cannot take care of himself at home and is open to nursing home placement.     Review of Systems  Objective:     Vital Signs (Most Recent):  Temp: 98.1 °F (36.7 °C) (08/08/23 1136)  Pulse: 68 (08/08/23 1136)  Resp: 19 (08/08/23 1136)  BP: (!) 151/67 (08/08/23 1136)  SpO2: 96 % (08/08/23 1136) Vital Signs (24h Range):  Temp:  [98 °F (36.7 °C)-99.1 °F (37.3 °C)] 98.1 °F (36.7 °C)  Pulse:  [62-68] 68  Resp:  [19-22] 19  SpO2:  [94 %-97 %] 96 %  BP: (125-151)/(58-67) 151/67     Weight: 98.9 kg (218 lb)  Body mass index is 30.4 kg/m².    Intake/Output Summary (Last 24 hours) at 8/8/2023 1508  Last data filed at 8/8/2023 0839  Gross per 24 hour   Intake 480 ml   Output 900 ml   Net -420 ml         Physical Exam      Vitals and nursing note reviewed.   Constitutional:       General: He is not in acute distress.     Appearance: He is well-developed. He is obese.   Cardiovascular:      Rate and Rhythm: Normal rate.   Pulmonary:      Effort: Pulmonary effort is normal. No respiratory distress.      Breath sounds: Normal breath sounds. No wheezing or rales.   Abdominal:      General: Bowel sounds are normal. There is no distension.      Palpations: Abdomen is soft.      Tenderness: There is no abdominal tenderness.   Musculoskeletal:         General: No tenderness. Normal range of motion.      Cervical back: Normal range of motion and neck supple.      Right lower leg: Edema present.      Left lower leg: Edema present.      Comments: Anasarca noted up to pelvis/abdomen  Feet/lower extremity edema improving but still with pitting edema extending up to abdomen   Skin:     General: Skin is warm and dry.   Significant Labs: All pertinent labs within the past 24 hours have been reviewed.    Significant Imaging: I have reviewed all pertinent imaging results/findings within the past 24 hours.

## 2023-08-08 NOTE — CONSULTS
[facility] Surg  Wound Care    Patient Name:  Chato Bowie   MRN:  [id number]  Date: 8/8/2023  Diagnosis: Chronic diastolic heart failure    History:     Past Medical History:   Diagnosis Date    Acute congestive heart failure 3/20/2020    Alcohol abuse     Arthritis     Asthma attack 11/24/2021    Coronary artery disease     Diabetes mellitus     Hyperlipemia     Hypertension     Multiple thyroid nodules 6/14/2023    Rhabdomyolysis        Social History     Socioeconomic History    Marital status: Single   Tobacco Use    Smoking status: Never    Smokeless tobacco: Never   Substance and Sexual Activity    Alcohol use: Not Currently     Alcohol/week: 6.0 standard drinks of alcohol     Types: 6 Cans of beer per week     Comment: daily    Drug use: No    Sexual activity: Yes     Partners: Female     Social Determinants of Health     Financial Resource Strain: Medium Risk (6/15/2023)    Overall Financial Resource Strain (CARDIA)     Difficulty of Paying Living Expenses: Somewhat hard   Food Insecurity: No Food Insecurity (6/15/2023)    Hunger Vital Sign     Worried About Running Out of Food in the Last Year: Never true     Ran Out of Food in the Last Year: Never true   Transportation Needs: No Transportation Needs (6/15/2023)    PRAPARE - Transportation     Lack of Transportation (Medical): No     Lack of Transportation (Non-Medical): No   Physical Activity: Inactive (6/15/2023)    Exercise Vital Sign     Days of Exercise per Week: 0 days     Minutes of Exercise per Session: 0 min   Stress: Stress Concern Present (6/15/2023)    [employer] [place] [employer] - Occupational Stress Questionnaire     Feeling of Stress : To some extent   Social Connections: Socially Isolated (6/15/2023)    Social Connection and Isolation Panel [NHANES]     Frequency of Communication with Friends and Family: Once a week     Frequency of Social Gatherings with Friends and Family: Once a week     Attends Restorationist Services: Never      Active Member of Clubs or Organizations: No     Attends Club or Organization Meetings: Never     Marital Status: Never    Housing Stability: Low Risk  (6/15/2023)    Housing Stability Vital Sign     Unable to Pay for Housing in the Last Year: No     Number of Places Lived in the Last Year: 1     Unstable Housing in the Last Year: No       Precautions:     Allergies as of 07/27/2023    (No Known Allergies)       WOC Assessment Details/Treatment   Consulted for severe edema in legs- evaluate for compressive wrappings  A 72 year old male readmitted 7/27/23 from home with COVID 19 virus; debility due to left knee pain and effusion due to gout? And /or injury; obesity due to excess calories; chronic diastolic heart failure; anasarca; CKD Stage IV; paroxysmal atrial flutter; BPH; alcohol abuse; DM II with kidney complication; dyslipidemia; essential hypertension  8/7 WBC 8.61 Hgb 8.2 Hct 24.9  7/31 Alb 1.8 Weight 218 lbs   7/17  LE veins bilateral- No DVT in either LE  On Isoflex mattress; Magdiel score 13  Admitted with wounds from shingles and right upper foot  8/7 8:01 am 4 Eyes Skin Assessment- Moisture associated   Treatment plan- from previous admission- Vashe + Mepilex to gluteal cleft severe IAD; Vashe + hydrogel to right buttock shingles lesions; use external catheter to divert urine from wounds  7/18 WOC assessment- edematous legs  Assessment:  Photodocumentation    Right lower leg- Pitting edema in dorsal foot and firm edema in lower leg.     Right lateral dorsal foot- 3.5 cm broken blister with peeling epidermis. Slough in base of wound.    Left lower leg- Small amount edema.  Sitting up in chair with legs elevated. Less edema.   Treatment:  Local wound care to right foot wound with Vashe daily. Continue elevation of lower extremities. Will use elevation at this time to manage edema.  Discussed compression with Dr. Evans.  Recommendations made to primary team. Orders placed.     08/08/2023

## 2023-08-08 NOTE — PLAN OF CARE
Problem: Physical Therapy  Goal: Physical Therapy Goal  Description: Goals to be met by: 23     Patient will increase functional independence with mobility by performin. Pt to be min A with bed mobility.  2. Pt to transfer with min A.  3. Pt to be able to tolerate standing with RW EOB; Pt to ambulate 15' w/RW min A.  4. Pt to be (I) with written HEP.    Outcome: Ongoing, Progressing   Pt agreeable to treat today, assisted OOB to chair.

## 2023-08-09 LAB
ANION GAP SERPL CALC-SCNC: 11 MMOL/L (ref 8–16)
BUN SERPL-MCNC: 88 MG/DL (ref 8–23)
CALCIUM SERPL-MCNC: 8.5 MG/DL (ref 8.7–10.5)
CHLORIDE SERPL-SCNC: 99 MMOL/L (ref 95–110)
CO2 SERPL-SCNC: 23 MMOL/L (ref 23–29)
CREAT SERPL-MCNC: 2.6 MG/DL (ref 0.5–1.4)
EST. GFR  (NO RACE VARIABLE): 25 ML/MIN/1.73 M^2
GLUCOSE SERPL-MCNC: 105 MG/DL (ref 70–110)
MAGNESIUM SERPL-MCNC: 2.1 MG/DL (ref 1.6–2.6)
POCT GLUCOSE: 133 MG/DL (ref 70–110)
POCT GLUCOSE: 137 MG/DL (ref 70–110)
POCT GLUCOSE: 144 MG/DL (ref 70–110)
POCT GLUCOSE: 148 MG/DL (ref 70–110)
POCT GLUCOSE: 202 MG/DL (ref 70–110)
POTASSIUM SERPL-SCNC: 4 MMOL/L (ref 3.5–5.1)
SODIUM SERPL-SCNC: 133 MMOL/L (ref 136–145)

## 2023-08-09 PROCEDURE — 25000003 PHARM REV CODE 250: Performed by: NURSE PRACTITIONER

## 2023-08-09 PROCEDURE — 25000003 PHARM REV CODE 250

## 2023-08-09 PROCEDURE — 36415 COLL VENOUS BLD VENIPUNCTURE: CPT | Performed by: HOSPITALIST

## 2023-08-09 PROCEDURE — 11000001 HC ACUTE MED/SURG PRIVATE ROOM

## 2023-08-09 PROCEDURE — 83735 ASSAY OF MAGNESIUM: CPT | Performed by: HOSPITALIST

## 2023-08-09 PROCEDURE — 25000003 PHARM REV CODE 250: Performed by: STUDENT IN AN ORGANIZED HEALTH CARE EDUCATION/TRAINING PROGRAM

## 2023-08-09 PROCEDURE — 25000003 PHARM REV CODE 250: Performed by: HOSPITALIST

## 2023-08-09 PROCEDURE — 63600175 PHARM REV CODE 636 W HCPCS: Performed by: HOSPITALIST

## 2023-08-09 PROCEDURE — 80048 BASIC METABOLIC PNL TOTAL CA: CPT | Performed by: HOSPITALIST

## 2023-08-09 RX ORDER — HYDROCODONE BITARTRATE AND ACETAMINOPHEN 5; 325 MG/1; MG/1
1 TABLET ORAL EVERY 8 HOURS PRN
Qty: 9 TABLET | Refills: 0 | Status: SHIPPED | OUTPATIENT
Start: 2023-08-09 | End: 2023-08-16 | Stop reason: SDUPTHER

## 2023-08-09 RX ADMIN — CAPSAICIN: 0.25 CREAM TOPICAL at 09:08

## 2023-08-09 RX ADMIN — HYDROCODONE BITARTRATE AND ACETAMINOPHEN 1 TABLET: 5; 325 TABLET ORAL at 06:08

## 2023-08-09 RX ADMIN — INSULIN DETEMIR 7 UNITS: 100 INJECTION, SOLUTION SUBCUTANEOUS at 09:08

## 2023-08-09 RX ADMIN — FUROSEMIDE 60 MG: 10 INJECTION, SOLUTION INTRAMUSCULAR; INTRAVENOUS at 05:08

## 2023-08-09 RX ADMIN — ACETAMINOPHEN 650 MG: 325 TABLET ORAL at 09:08

## 2023-08-09 RX ADMIN — APIXABAN 5 MG: 5 TABLET, FILM COATED ORAL at 09:08

## 2023-08-09 RX ADMIN — INSULIN ASPART 2 UNITS: 100 INJECTION, SOLUTION INTRAVENOUS; SUBCUTANEOUS at 09:08

## 2023-08-09 RX ADMIN — TRAMADOL HYDROCHLORIDE 50 MG: 50 TABLET, COATED ORAL at 05:08

## 2023-08-09 RX ADMIN — OXYCODONE HYDROCHLORIDE AND ACETAMINOPHEN 500 MG: 500 TABLET ORAL at 09:08

## 2023-08-09 RX ADMIN — LIDOCAINE 5% 2 PATCH: 700 PATCH TOPICAL at 05:08

## 2023-08-09 RX ADMIN — FUROSEMIDE 60 MG: 10 INJECTION, SOLUTION INTRAMUSCULAR; INTRAVENOUS at 09:08

## 2023-08-09 RX ADMIN — ONDANSETRON 8 MG: 8 TABLET, ORALLY DISINTEGRATING ORAL at 05:08

## 2023-08-09 RX ADMIN — GABAPENTIN 100 MG: 100 CAPSULE ORAL at 09:08

## 2023-08-09 NOTE — SUBJECTIVE & OBJECTIVE
Interval History:  Patient is still pending nursing home placement.  [facility]  No new complaints at this time.  Still states he feels weak and needs help.    Review of Systems  Objective:     Vital Signs (Most Recent):  Temp: 98.5 °F (36.9 °C) (08/09/23 1128)  Pulse: 61 (08/09/23 1128)  Resp: 20 (08/09/23 1128)  BP: 131/66 (08/09/23 1128)  SpO2: 96 % (08/09/23 1128) Vital Signs (24h Range):  Temp:  [98.1 °F (36.7 °C)-99.1 °F (37.3 °C)] 98.5 °F (36.9 °C)  Pulse:  [61-65] 61  Resp:  [18-22] 20  SpO2:  [91 %-97 %] 96 %  BP: (121-149)/(58-70) 131/66     Weight: 98.9 kg (218 lb)  Body mass index is 30.4 kg/m².    Intake/Output Summary (Last 24 hours) at 8/9/2023 1350  Last data filed at 8/9/2023 1349  Gross per 24 hour   Intake 940 ml   Output 2000 ml   Net -1060 ml         Physical Exam      Constitutional:       General: He is not in acute distress.     Appearance: He is well-developed. He is obese.   Cardiovascular:      Rate and Rhythm: Normal rate.   Pulmonary:      Effort: Pulmonary effort is normal. No respiratory distress.   Abdominal:      General: Bowel sounds are normal. There is no distension.      Palpations: Abdomen is soft.      Tenderness: There is no abdominal tenderness.   Musculoskeletal:         General: No tenderness. Normal range of motion.      Cervical back: Normal range of motion and neck supple.      Right lower leg: Edema present.      Left lower leg: Edema present.   Significant Labs: All pertinent labs within the past 24 hours have been reviewed.    Significant Imaging: I have reviewed all pertinent imaging results/findings within the past 24 hours.

## 2023-08-09 NOTE — PROGRESS NOTES
[facility] Medicine  Progress Note    Patient Name: Chato Bowie  MRN: [id number]  Patient Class: IP- Inpatient   Admission Date: 7/27/2023  Length of Stay: 10 days  Attending Physician: Jose L Evans III, MD  Primary Care Provider: Irlanda Valenzuela -        Subjective:     Principal Problem:Chronic diastolic heart failure        HPI:  Chato Bowie 72 y.o. male with CHF, CAD, DM, HTN, HLD, presents to the hospital with a chief complaint of BLE swelling and pain.  onset several weeks ago per chart review, patient was recently discharged from the ED yesterday for similar complaints, and was discharged from inpatient status on 07/25/2023. the patient had endorsed no relief to his complaints since discharge. Patient currently reports complaints of BLE swelling with associated pain. He states his complaints have been ongoing for a few weeks, but had worsened as of recent. He additionally reports he last had similar complaints a month ago, noting he had to be admitted for a couple of nights. He endorses compliance with antihypertensives and diuretics. He reports following with his PCP at [facility], but reports he does not know who is his cardiologist. Denies fever, shortness of breath, or cough.  Patient's niece states that the patient does not live in assisted living however he does need it as she can not provide care for him especially due to him being COVID positive.  No other alleviating or exacerbating factors noted.    In the ED patient was hypertensive (181/140) afebrile without leukocytosis, mild normocytic anemia noted, BUN 46, creatinine 2.7, EGFR 24, glucose 136, albumin 2.3, , TSH WNL, COVID positive, flu negative, UA showed +1 protein, +2 occult blood, CXR was negative for acute process.  Patient was placed in observation      Overview/Hospital Course:  Admission to hospital for BLE edema up hips due to acute on chronic diastolic heart failure and malnutrition. Patient also  found positive COVID 19 -asymptomatic on admission so Remdesivir discontinued. Edema improving with IV diuresis.  Became hypoxic night 7/29 requiring 2 L NC. Started Remdesivir/decadron (7/30), repeat CXR. Hypoglycemia (7/29) improved with oral intake and holding insulin.   He also have left  knee pain and edema similar to previous admission in June where Orthopedic surgery suspected gouty arthritis and attempted aspiration but only able to give steroid injection with improvement. No colchicine due to CKD. Left knee x ray on 7/17/23 showed significant suprapatellar effusion. Due to pain and decrease mobility, will ask Orthopedic Surgery to evaluate.  Hold apixaban  (last dose 7/29 2221).   Hgb treaded down 8.2>7.5>7.1>6.5 without over signs of bleeding. Had normal BM on 7/29 no melena or hematochezia. Previous iron studies low iron/t stat and elevated ferritin, normal vitamin b 12, and low folic acid. Start folic acid, ferrous sulfate and transfuse 1 unit of PRBC.   Recent admission 7/17-7/25 for sepsis due to shingles/cellulitis and ELIZABETH. Right buttock lesion from shingles are all in different part of healing phase without drainage. Also have gluteal cleft excoriation. Continue wound care as recommended by wound care team on previous admission-Cleanse both area with vashe, put mepilex to gluteal cleft, and put hydrogel to shingle lesion.   Also have right upper lateral back below axillary unroof blister and right foot ventral aspect skin tear. Keep clean and dry.     PT/OT evaluation completed. TN/SW to assist with SNF.               Interval History:  Patient is still pending nursing home placement.  Awaiting financials for acceptance to skilled nursing.  No new complaints at this time.  Still states he feels weak and needs help.    Review of Systems  Objective:     Vital Signs (Most Recent):  Temp: 98.5 °F (36.9 °C) (08/09/23 1128)  Pulse: 61 (08/09/23 1128)  Resp: 20 (08/09/23 1128)  BP: 131/66 (08/09/23 1128)  SpO2:  96 % (08/09/23 1128) Vital Signs (24h Range):  Temp:  [98.1 °F (36.7 °C)-99.1 °F (37.3 °C)] 98.5 °F (36.9 °C)  Pulse:  [61-65] 61  Resp:  [18-22] 20  SpO2:  [91 %-97 %] 96 %  BP: (121-149)/(58-70) 131/66     Weight: 98.9 kg (218 lb)  Body mass index is 30.4 kg/m².    Intake/Output Summary (Last 24 hours) at 8/9/2023 1350  Last data filed at 8/9/2023 1349  Gross per 24 hour   Intake 940 ml   Output 2000 ml   Net -1060 ml         Physical Exam      Constitutional:       General: He is not in acute distress.     Appearance: He is well-developed. He is obese.   Cardiovascular:      Rate and Rhythm: Normal rate.   Pulmonary:      Effort: Pulmonary effort is normal. No respiratory distress.   Abdominal:      General: Bowel sounds are normal. There is no distension.      Palpations: Abdomen is soft.      Tenderness: There is no abdominal tenderness.   Musculoskeletal:         General: No tenderness. Normal range of motion.      Cervical back: Normal range of motion and neck supple.      Right lower leg: Edema present.      Left lower leg: Edema present.   Significant Labs: All pertinent labs within the past 24 hours have been reviewed.    Significant Imaging: I have reviewed all pertinent imaging results/findings within the past 24 hours.      Assessment/Plan:      * acute on chronic diastolic heart failure  -Admitted to inpatient status  -On admit had severe edema / anasarca - BLE up to hips and abdomen with edema inhibiting ambulation.  -Noted recent 2 D echo in June HFpEF.   -Strict ins/outs and daily weights  -Slowly improving and for first time seeing some wrinkling of skin on legs, but still with significant deep edema in legs. Still no sign of contraction alkalosis, but Cr has bumped up a bit.  -Consult wound care to evaluate for compression wrappings for his legs  -Continue lasix 60mg TID at least through today.  -Planning NH placement at discharge.    Effusion of left knee  -X-ray on 07/17 showed significant  suprapatellar effusion.  Previous admission in June orthopedic surgery attempted aspiration but no fluid then injected steroid with improvement in pain  -Uric acid remains elevated at 10.5, but notably improved from prior measurements this year  -No colchicine secondary to CKD  -Consulted orthopedic surgery and input appreciated - no intervention at this time  -Today no complaints of pain.  -Added allopurinol 8/3  -Adding lidoderm patches    Multiple wounds  1. Gluteal cleft - excoriation- Wound care consult from previous admission few days ago-   2. Right buttock lesion from shingles healing at different stages - no vesicles  3. Right foot ventral aspect skin tear- clean with vashe and keep clean JASON  4. Right lateral back below armput few less then eraser size blister. - keep clean dry  -Wound care consulted and input appreciated  -Cleanse with vashe and put hydrogel to right buttock lesions from shingles     COVID-19 virus infection  -Initially asymptomatic now with non productive cough, 7/29 night 89% improve with O2 2 LNC  -CXR showed increase opacities and small pleural effusions  -Treated with dexamethasone, remdsivir, prn tessalon, albuterol inhaler bid and incentive spirometry  -Stopped dexamethasone due to hyperglycemia and he has completed remdesivir  -Clinically his covid infection has resolved and he is breathing comfortably on room air and symptom free  -covid isolation d/c as outside of infectious window    Debility Due to left knee pain and effusion due to gout? and/or injury  -Prior to admission patient used a roller walker for ambulation at baseline.  -On admit patient unable to ambulate even with walker due to swelling of his legs and left knee pain  -Xray of knee showed no acute findings  -orthopedic surgery consulted and input appreciated.  Ortho note 7/30 reviewed - report he does not ambulate and pain is chronic - he uses wheelchair and bed.  Noted L knee effusion felt secondary to arthritis  and no evidence of infection or need for aspiration.  -PT/OT consulted and input appreciated.  Recommended SNF which we attempted to get approved, but after peer to peer today that was denied by his insurance company - [employer].    -Discussed with patient 8/6 - states he prefers to go home with HH but not confident it is safe for him.  States he will think about it and discuss with his niece.  -8/7 patient states he would be unable to care for himself at home and is agreeable to NH placement.    Severe malnutrition  -Added protein supplementations with meals    Anasarca  -Due to HFpEF, CKD, malnutrition, COVID 19 infection and immobility- addressing these issues separately.     CKD (chronic kidney disease), stage IV  -Baseline Cr 2.7-3.0.  -Cr now 2.5.  Still no evidence of contraction alkalosis  -Remains fluid overloaded with edema, but is improving  -Avoid nephrotoxic agents and renally dose meds  -Diurese as above  -Repeat BMP in AM    Paroxysmal atrial flutter  -Stable  -Continue amiodarone, metoprolol and eliquis    BPH (benign prostatic hyperplasia)  -Chronic, stable, continue Flomax    Alcohol abuse  -Patient has a history of alcohol abuse however denies any recent used in the past week.    -No signs of DT this admission     Anemia  -Hgb drop this admission and compared to last month hgb 10-11 range.   -No melena or hematochezia noted.  -3/2016 EGD normal esophagus and erythematous stomach and duodenum but no mention ulcers/erosions  -CKD IV also contributing   -Low iron and t stat elevated ferritin, normal vitamin b 12, low folic acid  -Held eliquis initially but now resumed.  -Received 1 unit PRBC and added PPI.  -Started folic acid, ferrous sulfate  -Hb now 8.2    Type 2 diabetes mellitus with kidney complication, with long-term current use of insulin  -A1c 7.8  -Noted significant hyperglycemia earlier in stay   -Improved but with persistent morning hypoglycemia down to 70s on morning of 8/4, 54 on 8/5 and  62 today  -Stop scheduled aspart.  Change detemir to 7 units bid for now.  Continue SSI ac/hs    Dyslipidemia  -Chronic, stable, substitute for atorvastatin while in the hospital    Essential hypertension  -BP well controlled  -Continue home antihypertensives-amlodipine, hydralazine, metoprolol with parameters  -Prn hydralazine SBP >180      VTE Risk Mitigation (From admission, onward)         Ordered     apixaban tablet 5 mg  2 times daily         08/01/23 1648     Place MASOOD hose  Until discontinued         07/28/23 0719     IP VTE HIGH RISK PATIENT  Once         07/27/23 2326     Place sequential compression device  Until discontinued         07/27/23 2326     Reason for No Pharmacological VTE Prophylaxis  Once        Question:  Reasons:  Answer:  Already adequately anticoagulated on oral Anticoagulants    07/27/23 2326                Discharge Planning   ELY: 8/9/2023     Code Status: Full Code   Is the patient medically ready for discharge?:     Reason for patient still in hospital (select all that apply): Pending disposition  Discharge Plan A: Skilled Nursing Facility   Discharge Delays: None known at this time              Jose L Evans III, MD  Department of Hospital Medicine   [facility] Surg

## 2023-08-09 NOTE — NURSING
Report received and care assumed. Discussed plan of care and safety with patient . Reviewed call system. No acute distress noted   Airborne isolation continues. No acute distress noted.[facility]  Nurses Note -- 4 Eyes      8/9/2023       Skin assessed on: Q Shift      [] No Pressure Injuries Present    [x]Prevention Measures Documented    [x] Yes LDA  for Pressure Injury Previously documented     [] Yes New Pressure Injury Discovered   [] LDA for New Pressure Injury Added      Attending RN:  Anna Marie Vasquez RN     Second RN:  Balbir Walker LPN

## 2023-08-09 NOTE — NURSING
Patient isolation discontinued per hospital policy. Patient has been on  isolation for 10 days. Patient states he feels better.

## 2023-08-09 NOTE — PT/OT/SLP PROGRESS
Physical Therapy      Patient Name:  Chato Bowie   MRN:  [id number]    Patient not seen today secondary to Patient unwilling to participate. Will follow-up as able later hour/day .

## 2023-08-09 NOTE — PLAN OF CARE
[facility]     Department of Hospital Medicine     [address] [place] [address]     [phone] after hours  [phone] fax       NURSING HOME ORDERS    08/09/2023    Admit to Nursing Home:  Regular Bed         Diagnoses:  Active Hospital Problems    Diagnosis  POA    *acute on chronic diastolic heart failure [I50.32]  Yes    Effusion of left knee [M25.462]  Yes    Multiple wounds [T07.XXXA]  Yes     Chronic    COVID-19 virus infection [U07.1]  Yes    Debility Due to left knee pain and effusion due to gout? and/or injury [R53.81]  Yes    Severe malnutrition [E43]  Yes    Anasarca [R60.1]  Yes    CKD (chronic kidney disease), stage IV [N18.4]  Yes    Paroxysmal atrial flutter [I48.92]  Yes     Chronic    BPH (benign prostatic hyperplasia) [N40.0]  Yes    Alcohol abuse [F10.10]  Yes    Essential hypertension [I10]  Yes    Dyslipidemia [E78.5]  Yes    Type 2 diabetes mellitus with kidney complication, with long-term current use of insulin [E11.29, Z79.4]  Not Applicable     Chronic    Anemia [D64.9]  Yes      Resolved Hospital Problems    Diagnosis Date Resolved POA    DM (diabetes mellitus) type II controlled with renal manifestation [E11.29] 08/03/2023 Yes       Patient is homebound due to:  Chronic diastolic heart failure    Allergies:Review of patient's allergies indicates:  No Known Allergies    Vitals:    Once weekly         Diet:Cardiac   Supplement:  1 can every three times a day with meals                         Type:  House     Acitivities:   - Up in a chair each morning as tolerated   - Ambulate with assistance to bathroom   - Scheduled walks once each shift (every 8 hours)     - May use walker, cane, or self-propelled wheelchair      LABS:  Per facility protocol  Nursing Precautions:  - Fall precautions per nursing home protocol     - Decubitus precautions:        -  for positioning   - Pressure reducing foam mattress   - Turn patient every  two hours. Use wedge pillows to anchor patient    MISCELLANEOUS CARE:       Routine Skin for Bedridden Patients:  Apply moisture barrier cream to all    skin folds and wet areas in perineal area daily and after baths and                           all bowel movements.    Medications: Discontinue all previous medication orders, if any. See new list below.     Medication List        START taking these medications      HYDROcodone-acetaminophen 5-325 mg per tablet  Commonly known as: NORCO  Take 1 tablet by mouth every 8 (eight) hours as needed for Pain.  Replaces: HYDROcodone-acetaminophen  mg per tablet            CONTINUE taking these medications      albuterol 90 mcg/actuation inhaler  Commonly known as: PROVENTIL/VENTOLIN HFA  Inhale 2 puffs into the lungs every 6 (six) hours as needed for Wheezing or Shortness of Breath.     amiodarone 200 MG Tab  Commonly known as: PACERONE  Take 200 mg by mouth once daily.     amLODIPine 10 MG tablet  Commonly known as: NORVASC  Take 1 tablet (10 mg total) by mouth once daily.     ELIQUIS 5 mg Tab  Generic drug: apixaban  Take 5 mg by mouth 2 (two) times daily.     furosemide 40 MG tablet  Commonly known as: LASIX  Take 1 tablet (40 mg total) by mouth 2 (two) times daily.     gabapentin 100 MG capsule  Commonly known as: NEURONTIN  Take 1 capsule (100 mg total) by mouth 2 (two) times daily.     hydrALAZINE 100 MG tablet  Commonly known as: APRESOLINE  Take 1 tablet (100 mg total) by mouth every 8 (eight) hours.     insulin aspart U-100 100 unit/mL (3 mL) Inpn pen  Commonly known as: NovoLOG  Inject 5 Units into the skin 3 (three) times daily.     magnesium oxide 400 mg (241.3 mg magnesium) tablet  Commonly known as: MAG-OX  Take 800 mg by mouth.     metoprolol succinate 25 MG 24 hr tablet  Commonly known as: TOPROL-XL  Take 25 mg by mouth once daily.     NovoLIN 70/30 U-100 Insulin 100 unit/mL (70-30) injection  Generic drug: insulin NPH-insulin regular (70/30)  Inject 55  Units into the skin 2 (two) times daily.     potassium chloride 10 MEQ Tbsr  Commonly known as: KLOR-CON  Take 10 mEq by mouth once daily.     rosuvastatin 40 MG Tab  Commonly known as: CRESTOR  Take 40 mg by mouth every evening.     sildenafiL 100 MG tablet  Commonly known as: VIAGRA  TAKE 1 TABLET BY MOUTH ONCE DAILY AS NEEDED FOR ERECTILE DYSFUNCTION.     tamsulosin 0.4 mg Cap  Commonly known as: FLOMAX  Take 1 capsule (0.4 mg total) by mouth once daily.            STOP taking these medications      diclofenac sodium 1 % Gel  Commonly known as: VOLTAREN     HYDROcodone-acetaminophen  mg per tablet  Commonly known as: NORCO  Replaced by: HYDROcodone-acetaminophen 5-325 mg per tablet     metOLazone 5 MG tablet  Commonly known as: ZAROXOLYN                    _________________________________  Jose L Evans III, MD  08/09/2023

## 2023-08-09 NOTE — PLAN OF CARE
Problem: Fluid and Electrolyte Imbalance (Acute Kidney Injury/Impairment)  Goal: Fluid and Electrolyte Balance  Intervention: Monitor and Manage Fluid and Electrolyte Balance  Flowsheets (Taken 8/9/2023 1752)  Fluid/Electrolyte Management: fluids restricted     Problem: Oral Intake Inadequate (Acute Kidney Injury/Impairment)  Goal: Optimal Nutrition Intake  Intervention: Promote and Optimize Nutrition  Flowsheets (Taken 8/9/2023 1752)  Oral Nutrition Promotion: rest periods promoted     Problem: Skin Injury Risk Increased  Goal: Skin Health and Integrity  Intervention: Optimize Skin Protection  Flowsheets (Taken 8/9/2023 1752)  Pressure Reduction Techniques: weight shift assistance provided  Pressure Reduction Devices: pressure-redistributing mattress utilized  Skin Protection: tubing/devices free from skin contact  Head of Bed (HOB) Positioning: HOB at 30-45 degrees

## 2023-08-09 NOTE — PLAN OF CARE
Received call from Flower with [facility], stated after further review unable to accept patient.     Spoke with patient's niece, Leigh regarding discharge planning. Confirmed [facility] is no longer willing to accept patient. Per Aleena, she has pt's debt card information and is in the process of setting him up an online account to obtain his banking statements. She is awaiting an authorization code to be mailed to pt's address, which could take 5-10 business days. She made the request on 8/4/23.     Additional referral packet faxed to Billy Madden, with [facility]  [phone] for assistance with facility placement.    08/09/23 1522   Post-Acute Status   Post-Acute Authorization Placement   Post-Acute Placement Status Pending post-acute provider review/more information requested   Discharge Delays (!) Post-Acute Set-up   Discharge Plan   Discharge Plan A New Nursing Home placement - long-term care facility   Discharge Plan B Home Health

## 2023-08-09 NOTE — PT/OT/SLP PROGRESS
Occupational Therapy      Patient Name:  Chato Bowie   MRN:  [id number]    Patient not seen today secondary to Patient unwilling to participate. Due to nausea.  Will follow-up tomorrow.    8/9/2023

## 2023-08-09 NOTE — ASSESSMENT & PLAN NOTE
-Prior to admission patient used a roller walker for ambulation at baseline.  -On admit patient unable to ambulate even with walker due to swelling of his legs and left knee pain  -Xray of knee showed no acute findings  -orthopedic surgery consulted and input appreciated.  Ortho note 7/30 reviewed - report he does not ambulate and pain is chronic - he uses wheelchair and bed.  Noted L knee effusion felt secondary to arthritis and no evidence of infection or need for aspiration.  -PT/OT consulted and input appreciated.  Recommended SNF which we attempted to get approved, but after peer to peer today that was denied by his insurance company - [email or web address].    -Discussed with patient 8/6 - states he prefers to go home with HH but not confident it is safe for him.  States he will think about it and discuss with his niece.  -8/7 patient states he would be unable to care for himself at home and is agreeable to NH placement.

## 2023-08-09 NOTE — ASSESSMENT & PLAN NOTE
-Initially asymptomatic now with non productive cough, 7/29 night 89% improve with O2 2 LNC  -CXR showed increase opacities and small pleural effusions  -Treated with dexamethasone, remdsivir, prn tessalon, albuterol inhaler bid and incentive spirometry  -Stopped dexamethasone due to hyperglycemia and he has completed remdesivir  -Clinically his covid infection has resolved and he is breathing comfortably on room air and symptom free  -covid isolation d/c as outside of infectious window

## 2023-08-10 LAB
ANION GAP SERPL CALC-SCNC: 8 MMOL/L (ref 8–16)
BILIRUB UR QL STRIP: NEGATIVE
BUN SERPL-MCNC: 89 MG/DL (ref 8–23)
CALCIUM SERPL-MCNC: 8.1 MG/DL (ref 8.7–10.5)
CHLORIDE SERPL-SCNC: 101 MMOL/L (ref 95–110)
CLARITY UR: CLEAR
CO2 SERPL-SCNC: 26 MMOL/L (ref 23–29)
COLOR UR: YELLOW
CREAT SERPL-MCNC: 2.7 MG/DL (ref 0.5–1.4)
EST. GFR  (NO RACE VARIABLE): 24 ML/MIN/1.73 M^2
GLUCOSE SERPL-MCNC: 96 MG/DL (ref 70–110)
GLUCOSE UR QL STRIP: NEGATIVE
HGB UR QL STRIP: NEGATIVE
KETONES UR QL STRIP: NEGATIVE
LEUKOCYTE ESTERASE UR QL STRIP: NEGATIVE
MAGNESIUM SERPL-MCNC: 2.1 MG/DL (ref 1.6–2.6)
NITRITE UR QL STRIP: NEGATIVE
PH UR STRIP: 5 [PH] (ref 5–8)
POCT GLUCOSE: 124 MG/DL (ref 70–110)
POCT GLUCOSE: 192 MG/DL (ref 70–110)
POCT GLUCOSE: 206 MG/DL (ref 70–110)
POCT GLUCOSE: 86 MG/DL (ref 70–110)
POTASSIUM SERPL-SCNC: 3.9 MMOL/L (ref 3.5–5.1)
PROT UR QL STRIP: ABNORMAL
SODIUM SERPL-SCNC: 135 MMOL/L (ref 136–145)
SP GR UR STRIP: 1.01 (ref 1–1.03)
URN SPEC COLLECT METH UR: ABNORMAL
UROBILINOGEN UR STRIP-ACNC: NEGATIVE EU/DL

## 2023-08-10 PROCEDURE — 97110 THERAPEUTIC EXERCISES: CPT

## 2023-08-10 PROCEDURE — 97535 SELF CARE MNGMENT TRAINING: CPT

## 2023-08-10 PROCEDURE — 25000003 PHARM REV CODE 250: Performed by: NURSE PRACTITIONER

## 2023-08-10 PROCEDURE — 80048 BASIC METABOLIC PNL TOTAL CA: CPT | Performed by: HOSPITALIST

## 2023-08-10 PROCEDURE — 63600175 PHARM REV CODE 636 W HCPCS: Performed by: HOSPITALIST

## 2023-08-10 PROCEDURE — 87040 BLOOD CULTURE FOR BACTERIA: CPT | Performed by: STUDENT IN AN ORGANIZED HEALTH CARE EDUCATION/TRAINING PROGRAM

## 2023-08-10 PROCEDURE — 83735 ASSAY OF MAGNESIUM: CPT | Performed by: HOSPITALIST

## 2023-08-10 PROCEDURE — 81003 URINALYSIS AUTO W/O SCOPE: CPT | Performed by: STUDENT IN AN ORGANIZED HEALTH CARE EDUCATION/TRAINING PROGRAM

## 2023-08-10 PROCEDURE — 36415 COLL VENOUS BLD VENIPUNCTURE: CPT | Performed by: HOSPITALIST

## 2023-08-10 PROCEDURE — 25000003 PHARM REV CODE 250: Performed by: STUDENT IN AN ORGANIZED HEALTH CARE EDUCATION/TRAINING PROGRAM

## 2023-08-10 PROCEDURE — 25000003 PHARM REV CODE 250

## 2023-08-10 PROCEDURE — 97110 THERAPEUTIC EXERCISES: CPT | Mod: CQ

## 2023-08-10 PROCEDURE — 25000003 PHARM REV CODE 250: Performed by: HOSPITALIST

## 2023-08-10 PROCEDURE — 11000001 HC ACUTE MED/SURG PRIVATE ROOM

## 2023-08-10 PROCEDURE — 97530 THERAPEUTIC ACTIVITIES: CPT | Mod: CQ

## 2023-08-10 RX ORDER — FUROSEMIDE 40 MG/1
80 TABLET ORAL 2 TIMES DAILY
Status: DISCONTINUED | OUTPATIENT
Start: 2023-08-10 | End: 2023-08-16 | Stop reason: HOSPADM

## 2023-08-10 RX ADMIN — APIXABAN 5 MG: 5 TABLET, FILM COATED ORAL at 08:08

## 2023-08-10 RX ADMIN — FUROSEMIDE 80 MG: 40 TABLET ORAL at 05:08

## 2023-08-10 RX ADMIN — FUROSEMIDE 60 MG: 10 INJECTION, SOLUTION INTRAMUSCULAR; INTRAVENOUS at 05:08

## 2023-08-10 RX ADMIN — GABAPENTIN 100 MG: 100 CAPSULE ORAL at 08:08

## 2023-08-10 RX ADMIN — CAPSAICIN: 0.25 CREAM TOPICAL at 08:08

## 2023-08-10 RX ADMIN — HYDROCODONE BITARTRATE AND ACETAMINOPHEN 1 TABLET: 5; 325 TABLET ORAL at 01:08

## 2023-08-10 RX ADMIN — AMLODIPINE BESYLATE 10 MG: 5 TABLET ORAL at 08:08

## 2023-08-10 RX ADMIN — HYDROCODONE BITARTRATE AND ACETAMINOPHEN 1 TABLET: 5; 325 TABLET ORAL at 05:08

## 2023-08-10 RX ADMIN — INSULIN DETEMIR 7 UNITS: 100 INJECTION, SOLUTION SUBCUTANEOUS at 08:08

## 2023-08-10 RX ADMIN — AMIODARONE HYDROCHLORIDE 200 MG: 200 TABLET ORAL at 08:08

## 2023-08-10 RX ADMIN — TAMSULOSIN HYDROCHLORIDE 0.4 MG: 0.4 CAPSULE ORAL at 08:08

## 2023-08-10 RX ADMIN — FUROSEMIDE 60 MG: 10 INJECTION, SOLUTION INTRAMUSCULAR; INTRAVENOUS at 01:08

## 2023-08-10 RX ADMIN — ALLOPURINOL 100 MG: 100 TABLET ORAL at 08:08

## 2023-08-10 RX ADMIN — THERA TABS 1 TABLET: TAB at 08:08

## 2023-08-10 RX ADMIN — OXYCODONE HYDROCHLORIDE AND ACETAMINOPHEN 500 MG: 500 TABLET ORAL at 08:08

## 2023-08-10 RX ADMIN — LIDOCAINE 5% 2 PATCH: 700 PATCH TOPICAL at 05:08

## 2023-08-10 RX ADMIN — ATORVASTATIN CALCIUM 80 MG: 40 TABLET, FILM COATED ORAL at 08:08

## 2023-08-10 RX ADMIN — POLYETHYLENE GLYCOL 3350 17 G: 17 POWDER, FOR SOLUTION ORAL at 08:08

## 2023-08-10 RX ADMIN — FOLIC ACID 1 MG: 1 TABLET ORAL at 08:08

## 2023-08-10 RX ADMIN — METOPROLOL SUCCINATE 25 MG: 25 TABLET, EXTENDED RELEASE ORAL at 08:08

## 2023-08-10 NOTE — PT/OT/SLP PROGRESS
Occupational Therapy   Treatment    Name: Chato Bowie  MRN: [id number]  Admitting Diagnosis:  Chronic diastolic heart failure       Recommendations:     Discharge Recommendations: nursing facility, skilled  Discharge Equipment Recommendations:  none (TBD due to disposition d/c)  Barriers to discharge:  Other (Comment) (pt. at high risk for falls and readmission due to inability to care for himself at home)    Assessment:     Chato Bowie is a 72 y.o. male with a medical diagnosis of Chronic diastolic heart failure.  He presents with HOB elevated and 4L oxygen donned with B lower extremity edema and pain in B lower extremity. Performance deficits affecting function are impaired sensation.     Rehab Prognosis:  Fair; patient would benefit from acute skilled OT services to address these deficits and reach maximum level of function.       Plan:     Patient to be seen 5 x/week to address the above listed problems via self-care/home management, therapeutic activities, therapeutic exercises  Plan of Care Expires: 08/18/23  Plan of Care Reviewed with: patient    Subjective     Chief Complaint: B lower extremity edema and pain   Patient/Family Comments/goals: SNF placement   Pain/Comfort:  Pain Rating 1: 10/10  Location 1: leg  Pain Addressed 1: Pre-medicate for activity, Reposition    Objective:     Communicated with: RN prior to session.  Patient found up in chair with PureWick, telemetry, peripheral IV, oxygen upon OT entry to room.    General Precautions: Standard, fall, respiratory, diabetic    Orthopedic Precautions:N/A  Braces: N/A  Respiratory Status: Nasal cannula, flow 3  L/min     Occupational Performance:     Bed Mobility:    Patient completed Rolling/Turning to Left with  maximal assistance  Patient completed Scooting/Bridging with maximal assistance  Patient completed Supine to Sit with maximal assistance     Functional Mobility/Transfers:  Patient completed Sit <> Stand Transfer with maximal assistance and  of 2 persons  with  rolling walker   Patient completed Bed <> Chair Transfer using Step Transfer technique with maximal assistance and of 2 persons with rolling walker  Functional Mobility: Patient side stepped 3-4 steps to right side to t/f to chair and almost sat on floor. Patient     Activities of Daily Living:  Grooming: supervision seated       [facility] 6 Click ADL: 13    Treatment & Education:  Pt. Sat EOB to groom     Patient left up in chair with all lines intact, call button in reach, and RN  notified    GOALS:   Multidisciplinary Problems       Occupational Therapy Goals          Problem: Occupational Therapy    Goal Priority Disciplines Outcome Interventions   Occupational Therapy Goal     OT, PT/OT Ongoing, Progressing    Description: Goals to be met by: 8/18/23     Patient will increase functional independence with ADLs by performing:    UE Dressing with [place].  LE Dressing with Modified [place].  Grooming while seated at sink with [place].  Toileting from toilet with Modified [place] for hygiene and clothing management.   Bathing from  edge of bed with Minimal Assistance.  Toilet transfer to toilet with Modified [place].  Upper extremity exercise program x10-15 reps per handout, with supervision.      Patient seen by occupational therapy for initial evaluation, so see notes for more info. Patient is having difficulty taking care of himself at home, so needs SNF care. Occupational therapy to see 5x/week.                        Time Tracking:     OT Date of Treatment: 08/10/23  OT Start Time: 1340  OT Stop Time: 1356  OT Total Time (min): 16 min    Billable Minutes:Therapeutic Activity 16     OT/JASON: OT          8/10/2023

## 2023-08-10 NOTE — ASSESSMENT & PLAN NOTE
-Hgb drop this admission and compared to last month hgb 10-11 range.   -No melena or hematochezia noted.  -3/2016 EGD normal esophagus and erythematous stomach and duodenum but no mention ulcers/erosions  -CKD IV also contributing   -Low iron and t stat elevated ferritin, normal vitamin b 12, low folic acid  -Held eliquis initially but now resumed.  -Received 1 unit PRBC and added PPI.  -Started folic acid, ferrous sulfate  -Hb stable

## 2023-08-10 NOTE — NURSING
Report received from night nurse JOYCE William. Visualized patient and assessed patient's overall condition and appearance. No acute distress noted. Will continue to monitor

## 2023-08-10 NOTE — SUBJECTIVE & OBJECTIVE
Interval History: Pt still pending placement states he cannot go home. Niece is working on financials. Had fever overnight, but cxr without findings. No complaints at this time.    Review of Systems  Objective:     Vital Signs (Most Recent):  Temp: 97.8 °F (36.6 °C) (08/10/23 1113)  Pulse: 70 (08/10/23 1113)  Resp: 18 (08/10/23 1337)  BP: 129/62 (08/10/23 1113)  SpO2: (!) 93 % (08/10/23 1113) Vital Signs (24h Range):  Temp:  [97.4 °F (36.3 °C)-101.2 °F (38.4 °C)] 97.8 °F (36.6 °C)  Pulse:  [61-75] 70  Resp:  [18-21] 18  SpO2:  [92 %-96 %] 93 %  BP: (126-159)/(61-72) 129/62     Weight: 98.9 kg (218 lb)  Body mass index is 30.4 kg/m².    Intake/Output Summary (Last 24 hours) at 8/10/2023 1540  Last data filed at 8/10/2023 1307  Gross per 24 hour   Intake 505 ml   Output 2000 ml   Net -1495 ml         Physical Exam  Vitals reviewed.   Constitutional:       General: He is not in acute distress.     Appearance: He is ill-appearing.   HENT:      Head: Normocephalic and atraumatic.   Cardiovascular:      Rate and Rhythm: Normal rate and regular rhythm.   Pulmonary:      Effort: Pulmonary effort is normal. No respiratory distress (on 3l nc.).   Abdominal:      Palpations: Abdomen is soft.      Tenderness: There is no abdominal tenderness. There is no guarding or rebound.   Musculoskeletal:         General: No swelling or tenderness.   Skin:     General: Skin is warm and dry.   Neurological:      General: No focal deficit present.      Mental Status: He is alert and oriented to person, place, and time.   Psychiatric:         Mood and Affect: Mood normal.         Behavior: Behavior normal.             Significant Labs: All pertinent labs within the past 24 hours have been reviewed.    Significant Imaging: I have reviewed all pertinent imaging results/findings within the past 24 hours.

## 2023-08-10 NOTE — PLAN OF CARE
Case Management Re-assessment      PCP: [facility]  Pharmacy: [id number] - [place], [place] - [address]        Patient Arrived From: Home  Existing Help at Home: Jyoti [phone]      Barriers to Discharge: Financials for prison placement     Discharge Plan:               A. New senior living NH placement              B. Home health    Per [employer], patient denied SNF. Plan for prison placement.     Spoke with Adrianna with [facility], willing to accept patient and will have bed available.     Spoke with Ebonie with [facility], willing to accept patient and will reach out to pt's jero Abraham.     Placed call to pt's Leigh nogueira, left voice message. Awaiting confirmation on receipt banking statements.     4:00pm Received return call from pt's Leigh nogueira stated she is continuing to work on obtaining pt's financials and banking statements. Awaiting authorization code to be mailed to access his SS/bank card statements. Pt's jero is agreeable to [facility] once financials can be secured. TN to continue to follow for dc needs.    08/10/23 1421   Discharge Reassessment   Assessment Type Discharge Planning Reassessment   Did the patient's condition or plan change since previous assessment? No   Communicated ELY with patient/caregiver Yes   Discharge Plan A New Nursing Home placement - prison care facility   Discharge Plan B Home Health   DME Needed Upon Discharge  none   Transition of Care Barriers Social;Other (see comments)   Why the patient remains in the hospital Placement issues   Post-Acute Status   Discharge Delays (!) Post-Acute Set-up

## 2023-08-10 NOTE — ASSESSMENT & PLAN NOTE
-Prior to admission patient used a roller walker for ambulation at baseline.  -On admit patient unable to ambulate even with walker due to swelling of his legs and left knee pain  -Xray of knee showed no acute findings  -orthopedic surgery consulted and input appreciated.  Ortho note 7/30 reviewed - report he does not ambulate and pain is chronic - he uses wheelchair and bed.  Noted L knee effusion felt secondary to arthritis and no evidence of infection or need for aspiration.  -PT/OT consulted and input appreciated.  Recommended SNF which we attempted to get approved, but after peer to peer today that was denied by his insurance company - [employer].    -Discussed with patient 8/6 - states he prefers to go home with HH but not confident it is safe for him.  States he will think about it and discuss with his niece.  -8/7 patient states he would be unable to care for himself at home and is agreeable to NH placement.

## 2023-08-10 NOTE — PROGRESS NOTES
[facility]  Progress Note    Patient Name: Chato Bowie  MRN: [id number]  Patient Class: IP- Inpatient   Admission Date: 7/27/2023  Length of Stay: 11 days  Attending Physician: Jose L Evans III, MD  Primary Care Provider: Irlanda Valenzuela -        Subjective:     Principal Problem:Chronic diastolic heart failure        HPI:  Chato Bowie 72 y.o. male with CHF, CAD, DM, HTN, HLD, presents to the hospital with a chief complaint of BLE swelling and pain.  onset several weeks ago per chart review, patient was recently discharged from the ED yesterday for similar complaints, and was discharged from inpatient status on 07/25/2023. the patient had endorsed no relief to his complaints since discharge. Patient currently reports complaints of BLE swelling with associated pain. He states his complaints have been ongoing for a few weeks, but had worsened as of recent. He additionally reports he last had similar complaints a month ago, noting he had to be admitted for a couple of nights. He endorses compliance with antihypertensives and diuretics. He reports following with his PCP at [facility], but reports he does not know who is his cardiologist. Denies fever, shortness of breath, or cough.  Patient's niece states that the patient does not live in assisted living however he does need it as she can not provide care for him especially due to him being COVID positive.  No other alleviating or exacerbating factors noted.    In the ED patient was hypertensive (181/140) afebrile without leukocytosis, mild normocytic anemia noted, BUN 46, creatinine 2.7, EGFR 24, glucose 136, albumin 2.3, , TSH WNL, COVID positive, flu negative, UA showed +1 protein, +2 occult blood, CXR was negative for acute process.  Patient was placed in observation      Overview/Hospital Course:  Admission to hospital for BLE edema up hips due to acute on chronic diastolic heart failure and malnutrition. Patient also  found positive COVID 19 -asymptomatic on admission so Remdesivir discontinued. Edema improving with IV diuresis.  Became hypoxic night 7/29 requiring 2 L NC. Started Remdesivir/decadron (7/30), repeat CXR. Hypoglycemia (7/29) improved with oral intake and holding insulin.   He also have left  knee pain and edema similar to previous admission in June where Orthopedic surgery suspected gouty arthritis and attempted aspiration but only able to give steroid injection with improvement. No colchicine due to CKD. Left knee x ray on 7/17/23 showed significant suprapatellar effusion. Due to pain and decrease mobility, will ask Orthopedic Surgery to evaluate.  Hold apixaban  (last dose 7/29 2221).   Hgb treaded down 8.2>7.5>7.1>6.5 without over signs of bleeding. Had normal BM on 7/29 no melena or hematochezia. Previous iron studies low iron/t stat and elevated ferritin, normal vitamin b 12, and low folic acid. Start folic acid, ferrous sulfate and transfuse 1 unit of PRBC.   Recent admission 7/17-7/25 for sepsis due to shingles/cellulitis and ELIZABETH. Right buttock lesion from shingles are all in different part of healing phase without drainage. Also have gluteal cleft excoriation. Continue wound care as recommended by wound care team on previous admission-Cleanse both area with vashe, put mepilex to gluteal cleft, and put hydrogel to shingle lesion.   Also have right upper lateral back below axillary unroof blister and right foot ventral aspect skin tear. Keep clean and dry.     PT/OT evaluation completed. TN/SW to assist with SNF.               Interval History: Pt still pending placement states he cannot go home. Niece is working on financials. Had fever overnight, but cxr without findings. No complaints at this time.    Review of Systems  Objective:     Vital Signs (Most Recent):  Temp: 97.8 °F (36.6 °C) (08/10/23 1113)  Pulse: 70 (08/10/23 1113)  Resp: 18 (08/10/23 1337)  BP: 129/62 (08/10/23 1113)  SpO2: (!) 93 % (08/10/23  1113) Vital Signs (24h Range):  Temp:  [97.4 °F (36.3 °C)-101.2 °F (38.4 °C)] 97.8 °F (36.6 °C)  Pulse:  [61-75] 70  Resp:  [18-21] 18  SpO2:  [92 %-96 %] 93 %  BP: (126-159)/(61-72) 129/62     Weight: 98.9 kg (218 lb)  Body mass index is 30.4 kg/m².    Intake/Output Summary (Last 24 hours) at 8/10/2023 1540  Last data filed at 8/10/2023 1307  Gross per 24 hour   Intake 505 ml   Output 2000 ml   Net -1495 ml         Physical Exam  Vitals reviewed.   Constitutional:       General: He is not in acute distress.     Appearance: He is ill-appearing.   HENT:      Head: Normocephalic and atraumatic.   Cardiovascular:      Rate and Rhythm: Normal rate and regular rhythm.   Pulmonary:      Effort: Pulmonary effort is normal. No respiratory distress (on 3l nc.).   Abdominal:      Palpations: Abdomen is soft.      Tenderness: There is no abdominal tenderness. There is no guarding or rebound.   Musculoskeletal:         General: No swelling or tenderness.   Skin:     General: Skin is warm and dry.   Neurological:      General: No focal deficit present.      Mental Status: He is alert and oriented to person, place, and time.   Psychiatric:         Mood and Affect: Mood normal.         Behavior: Behavior normal.             Significant Labs: All pertinent labs within the past 24 hours have been reviewed.    Significant Imaging: I have reviewed all pertinent imaging results/findings within the past 24 hours.      Assessment/Plan:      * acute on chronic diastolic heart failure  -Admitted to inpatient status  -On admit had severe edema / anasarca - BLE up to hips and abdomen with edema inhibiting ambulation.  -Noted recent 2 D echo in June HFpEF.   -Strict ins/outs and daily weights  -Slowly improving and for first time seeing some wrinkling of skin on legs, but still with significant deep edema in legs. Still no sign of contraction alkalosis, but Cr has bumped up a bit.  -Consult wound care to evaluate for compression wrappings for  his legs  -Continue lasix 60mg TID at least through today.  -Planning NH placement at discharge.    Effusion of left knee  -X-ray on 07/17 showed significant suprapatellar effusion.  Previous admission in June orthopedic surgery attempted aspiration but no fluid then injected steroid with improvement in pain  -Uric acid remains elevated at 10.5, but notably improved from prior measurements this year  -No colchicine secondary to CKD  -Consulted orthopedic surgery and input appreciated - no intervention at this time  -Today no complaints of pain.  -Added allopurinol 8/3  -Adding lidoderm patches    Multiple wounds  1. Gluteal cleft - excoriation- Wound care consult from previous admission few days ago-   2. Right buttock lesion from shingles healing at different stages - no vesicles  3. Right foot ventral aspect skin tear- clean with vashe and keep clean JASON  4. Right lateral back below armput few less then eraser size blister. - keep clean dry  -Wound care consulted and input appreciated  -Cleanse with vashe and put hydrogel to right buttock lesions from shingles     COVID-19 virus infection  -Initially asymptomatic now with non productive cough, 7/29 night 89% improve with O2 2 LNC  -CXR showed increase opacities and small pleural effusions  -Treated with dexamethasone, remdsivir, prn tessalon, albuterol inhaler bid and incentive spirometry  -Stopped dexamethasone due to hyperglycemia and he has completed remdesivir  -Clinically his covid infection has resolved and he is breathing comfortably on room air and symptom free  -covid isolation d/c as outside of infectious window    Debility Due to left knee pain and effusion due to gout? and/or injury  -Prior to admission patient used a roller walker for ambulation at baseline.  -On admit patient unable to ambulate even with walker due to swelling of his legs and left knee pain  -Xray of knee showed no acute findings  -orthopedic surgery consulted and input appreciated.   Ortho note 7/30 reviewed - report he does not ambulate and pain is chronic - he uses wheelchair and bed.  Noted L knee effusion felt secondary to arthritis and no evidence of infection or need for aspiration.  -PT/OT consulted and input appreciated.  Recommended SNF which we attempted to get approved, but after peer to peer today that was denied by his insurance company - [employer].    -Discussed with patient 8/6 - states he prefers to go home with HH but not confident it is safe for him.  States he will think about it and discuss with his niece.  -8/7 patient states he would be unable to care for himself at home and is agreeable to NH placement.    Severe malnutrition  -Added protein supplementations with meals    Anasarca  -Due to HFpEF, CKD, malnutrition, COVID 19 infection and immobility- addressing these issues separately.     CKD (chronic kidney disease), stage IV  -Baseline Cr 2.7-3.0.  -Cr now 2.5.  Still no evidence of contraction alkalosis  -Remains fluid overloaded with edema, but is improving  -Avoid nephrotoxic agents and renally dose meds  -Diurese as above  -Repeat BMP in AM    Paroxysmal atrial flutter  -Stable  -Continue amiodarone, metoprolol and eliquis    BPH (benign prostatic hyperplasia)  -Chronic, stable, continue Flomax    Alcohol abuse  -Patient has a history of alcohol abuse however denies any recent used in the past week.    -No signs of DT this admission     Anemia  -Hgb drop this admission and compared to last month hgb 10-11 range.   -No melena or hematochezia noted.  -3/2016 EGD normal esophagus and erythematous stomach and duodenum but no mention ulcers/erosions  -CKD IV also contributing   -Low iron and t stat elevated ferritin, normal vitamin b 12, low folic acid  -Held eliquis initially but now resumed.  -Received 1 unit PRBC and added PPI.  -Started folic acid, ferrous sulfate  -Hb stable    Type 2 diabetes mellitus with kidney complication, with long-term current use of  insulin  -A1c 7.8  -Noted significant hyperglycemia earlier in stay   -Improved but with persistent morning hypoglycemia down to 70s on morning of 8/4, 54 on 8/5 and 62 today  -Stop scheduled aspart.  Change detemir to 7 units bid for now.  Continue SSI ac/hs    Dyslipidemia  -Chronic, stable, substitute for atorvastatin while in the hospital    Essential hypertension  -BP well controlled  -Continue home antihypertensives-amlodipine, hydralazine, metoprolol with parameters  -Prn hydralazine SBP >180    Fever  Pt with 101.2 fever overnight of 8/9.. CXR at the time with subtle findings, but no obvious infiltrate. No sxs currently. Will get repeat BC, procal, urine, and cbc to ensure no other cause, but no further fevers noted.      VTE Risk Mitigation (From admission, onward)         Ordered     apixaban tablet 5 mg  2 times daily         08/01/23 1648     Place MASOOD hose  Until discontinued         07/28/23 0719     IP VTE HIGH RISK PATIENT  Once         07/27/23 2326     Place sequential compression device  Until discontinued         07/27/23 2326     Reason for No Pharmacological VTE Prophylaxis  Once        Question:  Reasons:  Answer:  Already adequately anticoagulated on oral Anticoagulants    07/27/23 2326                Discharge Planning   ELY: 8/10/2023     Code Status: Full Code   Is the patient medically ready for discharge?:     Reason for patient still in hospital (select all that apply): Laboratory test, Treatment, Consult recommendations and Pending disposition  Discharge Plan A: New Nursing Home placement - assisted care facility   Discharge Delays: (!) Post-Acute Set-up              Jose L Evans III, MD  Department of Hospital Medicine   [facility] - Med Surg

## 2023-08-10 NOTE — NURSING
[facility]  Nurses Note -- 4 Eyes      8/10/2023       Skin assessed on: Q Shift      [] No Pressure Injuries Present    []Prevention Measures Documented    [x] Yes LDA  for Pressure Injury Previously documented     [] Yes New Pressure Injury Discovered   [] LDA for New Pressure Injury Added      Attending RN:  Niki Mina LPN     Second RN:  JOYCE William

## 2023-08-10 NOTE — NURSING
[facility] [place]  Nurses Note -- 4 Eyes      08/09/2023      Skin assessed on: Q Shift      [] No Pressure Injuries Present    [x]Prevention Measures Documented    [x] Yes LDA  for Pressure Injury Previously documented     [] Yes New Pressure Injury Discovered   [] LDA for New Pressure Injury Added      Attending RN:  Nataliia Laurent RN     Second RN: Anna Marie Vasquez RN

## 2023-08-10 NOTE — ASSESSMENT & PLAN NOTE
Pt with 101.2 fever overnight of 8/9.. CXR at the time with subtle findings, but no obvious infiltrate. No sxs currently. Will get repeat BC, procal, urine, and cbc to ensure no other cause, but no further fevers noted.

## 2023-08-10 NOTE — NURSING
Patient's SpO2 83% on room air, respiratory rate 24. Patient endorses SOB. Patient placed on 4L O2 NC. Patient's SpO2 increased to 100% on 4L O2 NC. Decreased O2 to 3L. Patient's SpO2 95% on 3L O2 NC. Patient also has temperature of 101.2. Notified Dr. Henao. STAT chest x-ray ordered. Will continue to monitor closely.

## 2023-08-10 NOTE — PLAN OF CARE
Problem: Occupational Therapy  Goal: Occupational Therapy Goal  Description: Goals to be met by: 8/18/23     Patient will increase functional independence with ADLs by performing:    UE Dressing with [place].  LE Dressing with Modified [place].  Grooming while seated at sink with [place].  Toileting from toilet with Modified [place] for hygiene and clothing management.   Bathing from  edge of bed with Minimal Assistance.  Toilet transfer to toilet with Modified [place].  Upper extremity exercise program x10-15 reps per handout, with supervision.    Outcome: Ongoing, Progressing   Patient did UB therex with shoulder arom x 10 reps x 2 sets with rest break, but is on 4L of oxygen today vs. Room air yesterday. Patient has more edema B lower extremity today. He also demonstrated 10 reps of spirmoter exercises with 94% oxygen on 4L.

## 2023-08-10 NOTE — NURSING
Patient lying in bed resting comfortably. VSS. All safety precautions maintained. Will continue to monitor.

## 2023-08-10 NOTE — PT/OT/SLP PROGRESS
Physical Therapy Treatment    Patient Name:  Chato Bowie   MRN:  [id number]    Recommendations:     Discharge Recommendations: nursing facility, skilled  Discharge Equipment Recommendations: none  Barriers to discharge:  pt is not functioning at Independent baseline and is at increased risk for falls and readmission if he returns to prior living arrangements at present                         Assessment:     Chato Bowie is a 72 y.o. male admitted with a medical diagnosis of Chronic diastolic heart failure.  He presents with the following impairments/functional limitations: weakness, impaired endurance, impaired self care skills, impaired functional mobility, gait instability, impaired balance, decreased coordination, decreased upper extremity function, decreased lower extremity function, decreased safety awareness, pain, decreased ROM, impaired skin, edema, impaired cardiopulmonary response to activity.    Rehab Prognosis: Good and Fair; patient would benefit from acute skilled PT services to address these deficits and reach maximum level of function.    Recent Surgery: * No surgery found *      Plan:     During this hospitalization, patient to be seen 3 x/week to address the identified rehab impairments via gait training, therapeutic activities, therapeutic exercises and progress toward the following goals:    Plan of Care Expires:  08/15/23    Subjective     Chief Complaint: 10/10 pain to L Knee and BLE swollen  Patient/Family Comments/goals: Pt agreed to participate.  Pain/Comfort:  Pain Rating 1: 10/10  Location - Side 1: Left  Location 1: knee  Pain Addressed 1: Reposition, Distraction, Cessation of Activity, Nurse notified  Pain Rating Post-Intervention 1: 10/10      Objective:     Communicated with nurse Prince prior to session.  Patient found HOB elevated with PureWick, telemetry, peripheral IV, oxygen upon PT entry to room.     General Precautions: Standard, fall, respiratory, diabetic  (Shingles?)  Orthopedic Precautions: N/A  Braces: N/A  Respiratory Status: Nasal cannula, flow 5 L/min     Functional Mobility:  Bed Mobility:     Rolling Left:  contact guard assistance using BR  Rolling Right: contact guard assistance using BR  Scooting: anterior scoot to EOB and side scoot toward HOB with stand by assistance  Supine to Sit: contact guard assistance and minimum assistance for Trunk support with HOB elevated  Sit to Supine: minimum assistance for BLE management  Transfers:   Gait belt and back gown donned prior Standing  Sit to Stand: from EOB with moderate assistance with rolling walker  Gait: attempted to have pt taking step to BS Chair after standing, pt was not able to move his foot or pivoting with c/o 10/10 pain to L Knee and BLE weakness, pt sat back down. Pt refused to stand up again despite MAX encouragement/explanation/education. Will cont to work with pt.  Balance: Good Sitting; Fair-/Fair Standing    Pt jose sitting up at EOB >20 min today    Pt was Mod I for wiping his face with warm towels provided by PTA.    AM-PAC 6 CLICK MOBILITY  Turning over in bed (including adjusting bedclothes, sheets and blankets)?: 3  Sitting down on and standing up from a chair with arms (e.g., wheelchair, bedside commode, etc.): 2  Moving from lying on back to sitting on the side of the bed?: 3  Moving to and from a bed to a chair (including a wheelchair)?: 2  Need to walk in hospital room?: 2  Climbing 3-5 steps with a railing?: 1  Basic Mobility Total Score: 13       Treatment & Education:  Re-educated pt on importance of Bed Mobility, elevating BLE above heart level + performing AP throughout the day for edema, benefits of OOB activity with hospital staff assistance and performing BLE ex throughout the day, pt verbalized understanding.     BLE ex in seated 2 sets x 15 reps AP, LAQ, Marching  (having pt counting number out loud) HR 71-76 bpm SpO2 92-95% on 3L/min Oxygen NC    Patient left bed in chair  position with R side offloaded by wedge, BLE offloaded by pillows, tray table + lunch tray in front, all lines intact, call button in reach, bed alarm on, and nurse notified.    GOALS:   Multidisciplinary Problems       Physical Therapy Goals          Problem: Physical Therapy    Goal Priority Disciplines Outcome Goal Variances Interventions   Physical Therapy Goal     PT, PT/OT Ongoing, Progressing     Description: Goals to be met by: 23     Patient will increase functional independence with mobility by performin. Pt to be min A with bed mobility.  2. Pt to transfer with min A.  3. Pt to be able to tolerate standing with RW EOB; Pt to ambulate 15' w/RW min A.  4. Pt to be (I) with written HEP.                         Time Tracking:     PT Received On: 08/10/23  PT Start Time: 1130     PT Stop Time: 1208  PT Total Time (min): 38 min     Billable Minutes: Therapeutic Activity 15 min and Therapeutic Exercise 23 min    Treatment Type: Treatment  PT/PTA: PTA     Number of PTA visits since last PT visit: 2     08/10/2023

## 2023-08-11 LAB
ANION GAP SERPL CALC-SCNC: 10 MMOL/L (ref 8–16)
BASOPHILS # BLD AUTO: 0.06 K/UL (ref 0–0.2)
BASOPHILS NFR BLD: 0.7 % (ref 0–1.9)
BUN SERPL-MCNC: 83 MG/DL (ref 8–23)
CALCIUM SERPL-MCNC: 8.5 MG/DL (ref 8.7–10.5)
CHLORIDE SERPL-SCNC: 99 MMOL/L (ref 95–110)
CO2 SERPL-SCNC: 25 MMOL/L (ref 23–29)
CREAT SERPL-MCNC: 2.7 MG/DL (ref 0.5–1.4)
DIFFERENTIAL METHOD: ABNORMAL
EOSINOPHIL # BLD AUTO: 0.1 K/UL (ref 0–0.5)
EOSINOPHIL NFR BLD: 1 % (ref 0–8)
ERYTHROCYTE [DISTWIDTH] IN BLOOD BY AUTOMATED COUNT: 15.9 % (ref 11.5–14.5)
EST. GFR  (NO RACE VARIABLE): 24 ML/MIN/1.73 M^2
GLUCOSE SERPL-MCNC: 101 MG/DL (ref 70–110)
HCT VFR BLD AUTO: 24.3 % (ref 40–54)
HGB BLD-MCNC: 7.6 G/DL (ref 14–18)
IMM GRANULOCYTES # BLD AUTO: 0.06 K/UL (ref 0–0.04)
IMM GRANULOCYTES NFR BLD AUTO: 0.7 % (ref 0–0.5)
LYMPHOCYTES # BLD AUTO: 0.7 K/UL (ref 1–4.8)
LYMPHOCYTES NFR BLD: 8.4 % (ref 18–48)
MCH RBC QN AUTO: 26.7 PG (ref 27–31)
MCHC RBC AUTO-ENTMCNC: 31.3 G/DL (ref 32–36)
MCV RBC AUTO: 85 FL (ref 82–98)
MONOCYTES # BLD AUTO: 1.5 K/UL (ref 0.3–1)
MONOCYTES NFR BLD: 16.5 % (ref 4–15)
NEUTROPHILS # BLD AUTO: 6.4 K/UL (ref 1.8–7.7)
NEUTROPHILS NFR BLD: 72.7 % (ref 38–73)
NRBC BLD-RTO: 0 /100 WBC
PLATELET # BLD AUTO: 206 K/UL (ref 150–450)
PMV BLD AUTO: 8.9 FL (ref 9.2–12.9)
POCT GLUCOSE: 165 MG/DL (ref 70–110)
POCT GLUCOSE: 172 MG/DL (ref 70–110)
POCT GLUCOSE: 174 MG/DL (ref 70–110)
POCT GLUCOSE: 196 MG/DL (ref 70–110)
POTASSIUM SERPL-SCNC: 4.4 MMOL/L (ref 3.5–5.1)
PROCALCITONIN SERPL IA-MCNC: 0.19 NG/ML
RBC # BLD AUTO: 2.85 M/UL (ref 4.6–6.2)
SARS-COV-2 RDRP RESP QL NAA+PROBE: POSITIVE
SODIUM SERPL-SCNC: 134 MMOL/L (ref 136–145)
WBC # BLD AUTO: 8.78 K/UL (ref 3.9–12.7)

## 2023-08-11 PROCEDURE — 85025 COMPLETE CBC W/AUTO DIFF WBC: CPT | Performed by: STUDENT IN AN ORGANIZED HEALTH CARE EDUCATION/TRAINING PROGRAM

## 2023-08-11 PROCEDURE — 25000003 PHARM REV CODE 250: Performed by: NURSE PRACTITIONER

## 2023-08-11 PROCEDURE — 97530 THERAPEUTIC ACTIVITIES: CPT | Mod: CQ

## 2023-08-11 PROCEDURE — 97535 SELF CARE MNGMENT TRAINING: CPT

## 2023-08-11 PROCEDURE — 97110 THERAPEUTIC EXERCISES: CPT | Mod: CQ

## 2023-08-11 PROCEDURE — 25000003 PHARM REV CODE 250: Performed by: STUDENT IN AN ORGANIZED HEALTH CARE EDUCATION/TRAINING PROGRAM

## 2023-08-11 PROCEDURE — 27000221 HC OXYGEN, UP TO 24 HOURS

## 2023-08-11 PROCEDURE — 86580 TB INTRADERMAL TEST: CPT | Performed by: STUDENT IN AN ORGANIZED HEALTH CARE EDUCATION/TRAINING PROGRAM

## 2023-08-11 PROCEDURE — 25000003 PHARM REV CODE 250: Performed by: HOSPITALIST

## 2023-08-11 PROCEDURE — 36415 COLL VENOUS BLD VENIPUNCTURE: CPT | Performed by: STUDENT IN AN ORGANIZED HEALTH CARE EDUCATION/TRAINING PROGRAM

## 2023-08-11 PROCEDURE — 94640 AIRWAY INHALATION TREATMENT: CPT

## 2023-08-11 PROCEDURE — 94761 N-INVAS EAR/PLS OXIMETRY MLT: CPT

## 2023-08-11 PROCEDURE — 97530 THERAPEUTIC ACTIVITIES: CPT

## 2023-08-11 PROCEDURE — 30200315 PPD INTRADERMAL TEST REV CODE 302: Performed by: STUDENT IN AN ORGANIZED HEALTH CARE EDUCATION/TRAINING PROGRAM

## 2023-08-11 PROCEDURE — 25000242 PHARM REV CODE 250 ALT 637 W/ HCPCS: Performed by: NURSE PRACTITIONER

## 2023-08-11 PROCEDURE — 84145 PROCALCITONIN (PCT): CPT | Performed by: STUDENT IN AN ORGANIZED HEALTH CARE EDUCATION/TRAINING PROGRAM

## 2023-08-11 PROCEDURE — 25000003 PHARM REV CODE 250

## 2023-08-11 PROCEDURE — 11000001 HC ACUTE MED/SURG PRIVATE ROOM

## 2023-08-11 PROCEDURE — 80048 BASIC METABOLIC PNL TOTAL CA: CPT | Performed by: HOSPITALIST

## 2023-08-11 PROCEDURE — U0002 COVID-19 LAB TEST NON-CDC: HCPCS | Performed by: STUDENT IN AN ORGANIZED HEALTH CARE EDUCATION/TRAINING PROGRAM

## 2023-08-11 RX ADMIN — THERA TABS 1 TABLET: TAB at 09:08

## 2023-08-11 RX ADMIN — BENZONATATE 100 MG: 100 CAPSULE ORAL at 09:08

## 2023-08-11 RX ADMIN — ATORVASTATIN CALCIUM 80 MG: 40 TABLET, FILM COATED ORAL at 09:08

## 2023-08-11 RX ADMIN — INSULIN ASPART 2 UNITS: 100 INJECTION, SOLUTION INTRAVENOUS; SUBCUTANEOUS at 05:08

## 2023-08-11 RX ADMIN — INSULIN ASPART 2 UNITS: 100 INJECTION, SOLUTION INTRAVENOUS; SUBCUTANEOUS at 12:08

## 2023-08-11 RX ADMIN — AMLODIPINE BESYLATE 10 MG: 5 TABLET ORAL at 09:08

## 2023-08-11 RX ADMIN — FUROSEMIDE 80 MG: 40 TABLET ORAL at 09:08

## 2023-08-11 RX ADMIN — HYDROCODONE BITARTRATE AND ACETAMINOPHEN 1 TABLET: 5; 325 TABLET ORAL at 12:08

## 2023-08-11 RX ADMIN — INSULIN ASPART 2 UNITS: 100 INJECTION, SOLUTION INTRAVENOUS; SUBCUTANEOUS at 09:08

## 2023-08-11 RX ADMIN — APIXABAN 5 MG: 5 TABLET, FILM COATED ORAL at 09:08

## 2023-08-11 RX ADMIN — OXYCODONE HYDROCHLORIDE AND ACETAMINOPHEN 500 MG: 500 TABLET ORAL at 09:08

## 2023-08-11 RX ADMIN — FOLIC ACID 1 MG: 1 TABLET ORAL at 09:08

## 2023-08-11 RX ADMIN — METOPROLOL SUCCINATE 25 MG: 25 TABLET, EXTENDED RELEASE ORAL at 09:08

## 2023-08-11 RX ADMIN — INSULIN DETEMIR 7 UNITS: 100 INJECTION, SOLUTION SUBCUTANEOUS at 09:08

## 2023-08-11 RX ADMIN — TUBERCULIN PURIFIED PROTEIN DERIVATIVE 5 UNITS: 5 INJECTION, SOLUTION INTRADERMAL at 04:08

## 2023-08-11 RX ADMIN — TAMSULOSIN HYDROCHLORIDE 0.4 MG: 0.4 CAPSULE ORAL at 09:08

## 2023-08-11 RX ADMIN — GABAPENTIN 100 MG: 100 CAPSULE ORAL at 09:08

## 2023-08-11 RX ADMIN — FUROSEMIDE 80 MG: 40 TABLET ORAL at 05:08

## 2023-08-11 RX ADMIN — ALBUTEROL SULFATE 2.5 MG: 2.5 SOLUTION RESPIRATORY (INHALATION) at 02:08

## 2023-08-11 RX ADMIN — AMIODARONE HYDROCHLORIDE 200 MG: 200 TABLET ORAL at 09:08

## 2023-08-11 RX ADMIN — ALLOPURINOL 100 MG: 100 TABLET ORAL at 09:08

## 2023-08-11 RX ADMIN — LIDOCAINE 5% 2 PATCH: 700 PATCH TOPICAL at 05:08

## 2023-08-11 NOTE — PT/OT/SLP PROGRESS
Occupational Therapy   Treatment    Name: Chato Bowie  MRN: [id number]  Admitting Diagnosis:  Chronic diastolic heart failure       Recommendations:     Discharge Recommendations: nursing facility, skilled  Discharge Equipment Recommendations:  none  Barriers to discharge:  Other (Comment)    Assessment:     Chato Bowie is a 72 y.o. male with a medical diagnosis of Chronic diastolic heart failure.  He presents with HOB elevated and 3L oxygen with 93% and HR 84 lying in bed. Performance deficits affecting function are weakness, impaired endurance, impaired sensation, impaired self care skills, impaired functional mobility, gait instability, impaired balance, decreased coordination, decreased upper extremity function, impaired cognition, decreased safety awareness, pain, decreased lower extremity function, decreased ROM, impaired skin, edema, impaired cardiopulmonary response to activity.     Rehab Prognosis:  Fair; patient would benefit from acute skilled OT services to address these deficits and reach maximum level of function.       Plan:     Patient to be seen 5 x/week to address the above listed problems via self-care/home management, therapeutic activities, therapeutic exercises  Plan of Care Expires: 08/18/23  Plan of Care Reviewed with: patient    Subjective     Chief Complaint: pain left lower extremity/knee; weakness, coughing; SOB   Patient/Family Comments/goals: NH   Pain/Comfort:  Pain Rating 1: 0/10  Location - Side 1: Left  Location 1: knee  Pain Addressed 1: Pre-medicate for activity, Reposition, Nurse notified, Distraction    Objective:     Communicated with: JOSLYN Raymond,  prior to session.  Patient found HOB elevated with telemetry, oxygen, PureWick upon OT entry to room.    General Precautions: Standard, fall, respiratory, diabetic    Orthopedic Precautions:N/A  Braces: N/A  Respiratory Status: Nasal cannula, flow 3  L/min; pt. Desatted to 85% on room air and on 3L after t/f from bed to chair       Occupational Performance:     Bed Mobility:    Patient completed Rolling/Turning to Left with  maximal assistance  Patient completed Scooting/Bridging with maximal assistance  Patient completed Supine to Sit with maximal assistance  Patient completed Sit to Supine with maximal assistance     Functional Mobility/Transfers:  Patient completed Sit <> Stand Transfer with maximal assistance and of 2  persons  with  rolling walker   Patient completed Bed <> Chair Transfer using Step Transfer technique with maximal assistance and of 2  persons with rolling walker  Functional Mobility: Patient took 2-3 side steps from bed to chair with RW, but needed total assistance to sit because he felt left lower extremity giving out.   He needed squat pivot t/f from chair to bed x 3 people x max assistance     Activities of Daily Living:  Grooming: stand by assistance seated EOB   Upper Body Dressing: minimum assistance to don outer gown   Lower Body Dressing: maximal assistance with donning/doffing socks       [facility] 6 Click ADL: 13    Treatment & Education:  Patient completed grooming EOB   Patient sat up in chair for > 2 hours today  Patient needs multiple verbal cues and tactile cues for all transfers due to poor safety awareness and B lower extremity edema     Patient left up in chair with all lines intact, call button in reach, and RN  notified  Patient returned to bed x 2-3 people after 2 hours     GOALS:   Multidisciplinary Problems       Occupational Therapy Goals          Problem: Occupational Therapy    Goal Priority Disciplines Outcome Interventions   Occupational Therapy Goal     OT, PT/OT Ongoing, Not Progressing    Description: Goals to be met by: 8/18/23     Patient will increase functional independence with ADLs by performing:    UE Dressing with Home.  LE Dressing with Modified Home.  Grooming while seated at sink with Home.  Toileting from toilet with Modified Home for hygiene and  clothing management.   Bathing from  edge of bed with Minimal Assistance.  Toilet transfer to toilet with Modified [place].  Upper extremity exercise program x10-15 reps per handout, with supervision.      Patient seen by occupational therapy for initial evaluation, so see notes for more info. Patient is having difficulty taking care of himself at home, so needs SNF care. Occupational therapy to see 5x/week.                        Time Tracking:     OT Date of Treatment: 08/11/23  OT Start Time: 1245  OT Stop Time: 1325  OT Total Time (min): 40 min  Second session: [id number]: 15 min     Billable Minutes:Self Care/Home Management 15  Therapeutic Activity 40    Co-treat with PTA   OT/JASON: OT          8/11/2023

## 2023-08-11 NOTE — PLAN OF CARE
Problem: Occupational Therapy  Goal: Occupational Therapy Goal  Description: Goals to be met by: 8/18/23     Patient will increase functional independence with ADLs by performing:    UE Dressing with [place].  LE Dressing with Modified [place].  Grooming while seated at sink with [place].  Toileting from toilet with Modified [place] for hygiene and clothing management.   Bathing from  edge of bed with Minimal Assistance.  Toilet transfer to toilet with Modified [place].  Upper extremity exercise program x10-15 reps per handout, with supervision.    Outcome: Ongoing, Not Progressing   Patient is becoming agitated and making negative comments re: not wanting to participate or that he cannot do tasks.

## 2023-08-11 NOTE — ASSESSMENT & PLAN NOTE
-Prior to admission patient used a roller walker for ambulation at baseline.  -On admit patient unable to ambulate even with walker due to swelling of his legs and left knee pain  -Xray of knee showed no acute findings  -orthopedic surgery consulted and input appreciated.  Ortho note 7/30 reviewed - report he does not ambulate and pain is chronic - he uses wheelchair and bed.  Noted L knee effusion felt secondary to arthritis and no evidence of infection or need for aspiration.  -PT/OT consulted and input appreciated.  Recommended SNF which we attempted to get approved, but after peer to peer today that was denied by his insurance company - [employer].    -Discussed with patient 8/6 - states he prefers to go home with HH but not confident it is safe for him.  States he will think about it and discuss with his niece.  -8/7 patient states he would be unable to care for himself at home and is agreeable to NH placement.

## 2023-08-11 NOTE — ASSESSMENT & PLAN NOTE
-Admitted to inpatient status  -On admit had severe edema / anasarca - BLE up to hips and abdomen with edema inhibiting ambulation.  -Noted recent 2 D echo in June HFpEF.   -Strict ins/outs and daily weights  -Slowly improving and for first time seeing some wrinkling of skin on legs, but still with significant deep edema in legs. Still no sign of contraction alkalosis, but Cr has bumped up a bit.  -Consult wound care to evaluate for compression wrappings for his legs  -Switched to po lasix 80 mg bid   -Planning NH placement at discharge.

## 2023-08-11 NOTE — PLAN OF CARE
Plan for patient discharge to detention nursing home. Patient medically accepted to [facility] and [facility]. Will require banking statements, financials and admission paperwork.     Per Billy, out reach consultant with [facility], ([email or web address] [phone]) patient accepted to [place], [place] and willing to work with patient to obtain financials. 142/pasrr sent to Billy via email. Will need rapid covid, ppd and updated MAR upon dc.    Spoke with physician, patient and pt's Leigh nogueira at bedside. Reviewed accepting facilities and placement options. Per Leigh, will go to the bank and to patient's home today to work on obtaining required banking statements in order to secure placement locally. [place] to continue to follow up.    08/11/23 1330   Post-Acute Status   Post-Acute Authorization Placement   Post-Acute Placement Status Pending post-acute provider review/more information requested   Discharge Delays (!) Post-Acute Set-up   Discharge Plan   Discharge Plan A New Nursing Home placement - detention care facility   Discharge Plan B Home Health

## 2023-08-11 NOTE — NURSING
Bedside report given to night nurse JOYCE William. Walking rounds completed. Visualized and assessed patient. NAD noted. Safety precautions maintained and call light within reach.    Chart check completed.

## 2023-08-11 NOTE — PROGRESS NOTES
[facility] Medicine  Progress Note    Patient Name: Chato Bowie  MRN: [id number]  Patient Class: IP- Inpatient   Admission Date: 7/27/2023  Length of Stay: 12 days  Attending Physician: Jose L Evans III, MD  Primary Care Provider: Irlanda Valenzuela -        Subjective:     Principal Problem:Chronic diastolic heart failure        HPI:  Chato Bowie 72 y.o. male with CHF, CAD, DM, HTN, HLD, presents to the hospital with a chief complaint of BLE swelling and pain.  onset several weeks ago per chart review, patient was recently discharged from the ED yesterday for similar complaints, and was discharged from inpatient status on 07/25/2023. the patient had endorsed no relief to his complaints since discharge. Patient currently reports complaints of BLE swelling with associated pain. He states his complaints have been ongoing for a few weeks, but had worsened as of recent. He additionally reports he last had similar complaints a month ago, noting he had to be admitted for a couple of nights. He endorses compliance with antihypertensives and diuretics. He reports following with his PCP at [facility], but reports he does not know who is his cardiologist. Denies fever, shortness of breath, or cough.  Patient's niece states that the patient does not live in assisted living however he does need it as she can not provide care for him especially due to him being COVID positive.  No other alleviating or exacerbating factors noted.    In the ED patient was hypertensive (181/140) afebrile without leukocytosis, mild normocytic anemia noted, BUN 46, creatinine 2.7, EGFR 24, glucose 136, albumin 2.3, , TSH WNL, COVID positive, flu negative, UA showed +1 protein, +2 occult blood, CXR was negative for acute process.  Patient was placed in observation      Overview/Hospital Course:  Admission to hospital for BLE edema up hips due to acute on chronic diastolic heart failure and malnutrition. Patient also  found positive COVID 19 -asymptomatic on admission so Remdesivir discontinued. Edema improving with IV diuresis.  Became hypoxic night 7/29 requiring 2 L NC. Started Remdesivir/decadron (7/30), repeat CXR. Hypoglycemia (7/29) improved with oral intake and holding insulin.   He also have left  knee pain and edema similar to previous admission in June where Orthopedic surgery suspected gouty arthritis and attempted aspiration but only able to give steroid injection with improvement. No colchicine due to CKD. Left knee x ray on 7/17/23 showed significant suprapatellar effusion. Due to pain and decrease mobility, will ask Orthopedic Surgery to evaluate.  Hold apixaban  (last dose 7/29 2221).   Hgb treaded down 8.2>7.5>7.1>6.5 without over signs of bleeding. Had normal BM on 7/29 no melena or hematochezia. Previous iron studies low iron/t stat and elevated ferritin, normal vitamin b 12, and low folic acid. Start folic acid, ferrous sulfate and transfuse 1 unit of PRBC.   Recent admission 7/17-7/25 for sepsis due to shingles/cellulitis and ELIZABETH. Right buttock lesion from shingles are all in different part of healing phase without drainage. Also have gluteal cleft excoriation. Continue wound care as recommended by wound care team on previous admission-Cleanse both area with vashe, put mepilex to gluteal cleft, and put hydrogel to shingle lesion.   Also have right upper lateral back below axillary unroof blister and right foot ventral aspect skin tear. Keep clean and dry.     PT/OT evaluation completed. TN/SW to assist with SNF.             S: Pt states he is feeling fine today. Niece at bedside discussed imperative nature of her finding financial statements which she states she is going to his house to find today. Pt is accepted at multiple nursing homes just pending bank statements.      O:  Vitals reviewed.   Constitutional:       General: He is not in acute distress.     Appearance: He is ill-appearing.   HENT:       Head: Normocephalic and atraumatic.   Cardiovascular:      Rate and Rhythm: Normal rate and regular rhythm.   Pulmonary:      Effort: Pulmonary effort is normal. No respiratory distress (on 3l nc.).   Abdominal:      Palpations: Abdomen is soft.      Tenderness: There is no abdominal tenderness. There is no guarding or rebound.   Musculoskeletal:         General: No swelling or tenderness.   Skin:     General: Skin is warm and dry.   Neurological:      General: No focal deficit present.      Mental Status: He is alert and oriented to person, place, and time.   Psychiatric:         Mood and Affect: Mood normal.         Behavior: Behavior normal.     Assessment/Plan:      * acute on chronic diastolic heart failure  -Admitted to inpatient status  -On admit had severe edema / anasarca - BLE up to hips and abdomen with edema inhibiting ambulation.  -Noted recent 2 D echo in June HFpEF.   -Strict ins/outs and daily weights  -Slowly improving and for first time seeing some wrinkling of skin on legs, but still with significant deep edema in legs. Still no sign of contraction alkalosis, but Cr has bumped up a bit.  -Consult wound care to evaluate for compression wrappings for his legs  -Switched to po lasix 80 mg bid   -Planning NH placement at discharge.    Effusion of left knee  -X-ray on 07/17 showed significant suprapatellar effusion.  Previous admission in June orthopedic surgery attempted aspiration but no fluid then injected steroid with improvement in pain  -Uric acid remains elevated at 10.5, but notably improved from prior measurements this year  -No colchicine secondary to CKD  -Consulted orthopedic surgery and input appreciated - no intervention at this time  -Today no complaints of pain.  -Added allopurinol 8/3  -Adding lidoderm patches    Multiple wounds  1. Gluteal cleft - excoriation- Wound care consult from previous admission few days ago-   2. Right buttock lesion from shingles healing at different stages -  no vesicles  3. Right foot ventral aspect skin tear- clean with vashe and keep clean JASON  4. Right lateral back below armput few less then eraser size blister. - keep clean dry  -Wound care consulted and input appreciated  -Cleanse with vashe and put hydrogel to right buttock lesions from shingles     COVID-19 virus infection  -Initially asymptomatic now with non productive cough, 7/29 night 89% improve with O2 2 LNC  -CXR showed increase opacities and small pleural effusions  -Treated with dexamethasone, remdsivir, prn tessalon, albuterol inhaler bid and incentive spirometry  -Stopped dexamethasone due to hyperglycemia and he has completed remdesivir  -Clinically his covid infection has resolved and he is breathing comfortably on room air and symptom free  -covid isolation d/c as outside of infectious window    Debility Due to left knee pain and effusion due to gout? and/or injury  -Prior to admission patient used a roller walker for ambulation at baseline.  -On admit patient unable to ambulate even with walker due to swelling of his legs and left knee pain  -Xray of knee showed no acute findings  -orthopedic surgery consulted and input appreciated.  Ortho note 7/30 reviewed - report he does not ambulate and pain is chronic - he uses wheelchair and bed.  Noted L knee effusion felt secondary to arthritis and no evidence of infection or need for aspiration.  -PT/OT consulted and input appreciated.  Recommended SNF which we attempted to get approved, but after peer to peer today that was denied by his insurance company - "[employer].    -Discussed with patient 8/6 - states he prefers to go home with HH but not confident it is safe for him.  States he will think about it and discuss with his niece.  -8/7 patient states he would be unable to care for himself at home and is agreeable to NH placement.    Severe malnutrition  -Added protein supplementations with meals    Anasarca  -Due to HFpEF, CKD, malnutrition, COVID 19  infection and immobility- addressing these issues separately.     CKD (chronic kidney disease), stage IV  -Baseline Cr 2.7-3.0.  -Cr now 2.5.  Still no evidence of contraction alkalosis  -Remains fluid overloaded with edema, but is improving  -Avoid nephrotoxic agents and renally dose meds  -Diurese as above  -Repeat BMP in AM    Paroxysmal atrial flutter  -Stable  -Continue amiodarone, metoprolol and eliquis    BPH (benign prostatic hyperplasia)  -Chronic, stable, continue Flomax    Alcohol abuse  -Patient has a history of alcohol abuse however denies any recent used in the past week.    -No signs of DT this admission     Anemia  -Hgb drop this admission and compared to last month hgb 10-11 range.   -No melena or hematochezia noted.  -3/2016 EGD normal esophagus and erythematous stomach and duodenum but no mention ulcers/erosions  -CKD IV also contributing   -Low iron and t stat elevated ferritin, normal vitamin b 12, low folic acid  -Held eliquis initially but now resumed.  -Received 1 unit PRBC and added PPI.  -Started folic acid, ferrous sulfate  -Hb stable    Type 2 diabetes mellitus with kidney complication, with long-term current use of insulin  -A1c 7.8  -Noted significant hyperglycemia earlier in stay   -Improved but with persistent morning hypoglycemia down to 70s on morning of 8/4, 54 on 8/5 and 62 today  -Stop scheduled aspart.  Change detemir to 7 units bid for now.  Continue SSI ac/hs    Dyslipidemia  -Chronic, stable, substitute for atorvastatin while in the hospital    Essential hypertension  -BP well controlled  -Continue home antihypertensives-amlodipine, hydralazine, metoprolol with parameters  -Prn hydralazine SBP >180    Fever  Pt with 101.2 fever overnight of 8/9.. CXR at the time with subtle findings, but no obvious infiltrate. No sxs currently. No leukocytosis, negative urine, procal negative. No repeat fevers. Does not appear to be infectious in origin      VTE Risk Mitigation (From  admission, onward)         Ordered     apixaban tablet 5 mg  2 times daily         08/01/23 1648     Place MASOOD hose  Until discontinued         07/28/23 0719     IP VTE HIGH RISK PATIENT  Once         07/27/23 2326     Place sequential compression device  Until discontinued         07/27/23 2326     Reason for No Pharmacological VTE Prophylaxis  Once        Question:  Reasons:  Answer:  Already adequately anticoagulated on oral Anticoagulants    07/27/23 2326                Discharge Planning   ELY: 8/10/2023     Code Status: Full Code   Is the patient medically ready for discharge?:     Reason for patient still in hospital (select all that apply): Pending disposition  Discharge Plan A: New Nursing Home placement - care home care facility   Discharge Delays: (!) Post-Acute Set-up              Jose L Evans III, MD  Department of Hospital Medicine   [facility] Surg

## 2023-08-11 NOTE — NURSING
[facility]  Nurses Note -- 4 Eyes      8/11/2023       Skin assessed on: Admit      [] No Pressure Injuries Present    []Prevention Measures Documented    [x] Yes LDA  for Pressure Injury Previously documented     [] Yes New Pressure Injury Discovered   [] LDA for New Pressure Injury Added      Attending RN:  Mandi Watkins LPN     Second RN:  JOYCE William

## 2023-08-11 NOTE — ASSESSMENT & PLAN NOTE
Pt with 101.2 fever overnight of 8/9.. CXR at the time with subtle findings, but no obvious infiltrate. No sxs currently. No leukocytosis, negative urine, procal negative. No repeat fevers. Does not appear to be infectious in origin

## 2023-08-11 NOTE — PT/OT/SLP PROGRESS
Physical Therapy Treatment    Patient Name:  Chato Bowie   MRN:  [id number]    Recommendations:     Discharge Recommendations: nursing facility, skilled  Discharge Equipment Recommendations: none  Barriers to discharge:   pt is not functioning at Independent baseline and is at increased risk for falls and readmission if he returns to prior living arrangements at present                         Assessment:     Chato Bowie is a 72 y.o. male admitted with a medical diagnosis of Chronic diastolic heart failure.  He presents with the following impairments/functional limitations: weakness, impaired endurance, impaired self care skills, impaired functional mobility, impaired balance, gait instability, decreased coordination, decreased upper extremity function, decreased lower extremity function, decreased safety awareness, pain, decreased ROM, impaired skin, edema, impaired cardiopulmonary response to activity.    Pt with poor motivation, pain, weakness, and required increased assistance with Transfer activity today. PT will cont to benefit from skilled therapy with multidisciplinary approach to reach goals.    Rehab Prognosis: Fair; patient would benefit from acute skilled PT services to address these deficits and reach maximum level of function.    Recent Surgery: * No surgery found *      Plan:     During this hospitalization, patient to be seen 3 x/week to address the identified rehab impairments via gait training, therapeutic activities, therapeutic exercises and progress toward the following goals:    Plan of Care Expires:  08/15/23    Subjective     Chief Complaint: pain and weakness  Patient/Family Comments/goals: Pt agreed to participate.  Pain/Comfort:  Pain Rating 1:  (pt did not rate)  Location - Side 1: Left  Location 1: knee  Pain Addressed 1: Pre-medicate for activity, Reposition, Distraction, Cessation of Activity  Pain Rating Post-Intervention 1:  (pt did not rate)      Objective:     Communicated with  nurse Mandi HYDE prior to session.  Patient found HOB elevated with peripheral IV, telemetry, oxygen, PureWick upon PT entry to room.     General Precautions: Standard, fall, respiratory, diabetic  Orthopedic Precautions: N/A  Braces: N/A  Respiratory Status: Nasal cannula, flow 3 L/min     Functional Mobility:  Bed Mobility:     Scooting: anterior scoot to EOB with  contact guard assistance  Supine to Sit: contact guard assistance with HOB elevated  Transfers:   Gait belt and back gown donned prior transfer  Sit to Stand: from EOB with rolling walker: 1st trial with moderate assistance, and of 2 persons, pt was not able to achieve a full stand and sat back down, 2nd trial with minimum assistance and of 2 persons and bed raised up  Bed to Chair: maximal assistance and of 2 persons with  rolling walker using Stand Pivot, pt was not able to move or pivot his R Foot toward BS Chair (on pt's R side)  Balance: Good Sitting; Fair- Standing      AM-PAC 6 CLICK MOBILITY  Turning over in bed (including adjusting bedclothes, sheets and blankets)?: 3  Sitting down on and standing up from a chair with arms (e.g., wheelchair, bedside commode, etc.): 2  Moving from lying on back to sitting on the side of the bed?: 3  Moving to and from a bed to a chair (including a wheelchair)?: 2  Need to walk in hospital room?: 2  Climbing 3-5 steps with a railing?: 1  Basic Mobility Total Score: 13       Treatment & Education:  Re-educated pt on importance of Bed Mobility, elevating BLE above heart level + performing AP throughout the day for edema, benefits of OOB activity with hospital staff assistance and performing BLE ex throughout the day, pt verbalized understanding.     BLE ex in seated 2 sets x 15 reps AP, LAQ, Marching  (having pt counting number out loud) HR 71-76 bpm SpO2 88-95% on 3L/min Oxygen NC    Patient left up in chair on pillow in reclined position with BLE offloaded/elevated by pillows, tray table in front, all lines  intact, call button in reach, and nurse notified.    GOALS:   Multidisciplinary Problems       Physical Therapy Goals          Problem: Physical Therapy    Goal Priority Disciplines Outcome Goal Variances Interventions   Physical Therapy Goal     PT, PT/OT Ongoing, Progressing     Description: Goals to be met by: 23     Patient will increase functional independence with mobility by performin. Pt to be min A with bed mobility.  2. Pt to transfer with min A.  3. Pt to be able to tolerate standing with RW EOB; Pt to ambulate 15' w/RW min A.  4. Pt to be (I) with written HEP.                         Time Tracking:     PT Received On: 23  PT Start Time: 1250     PT Stop Time: 1329  PT Total Time (min): 39 min     Billable Minutes: Therapeutic Activity 24 min and Therapeutic Exercise 15 min (co-treated with OT)    PTA/OT back to pt's room to get pt back to bed 16:00-16:15 pm Total time: 15 min Therapeutic Activity 15 min (Co-treated with OT)    Pt jose >2 hrs sitting UIC  Sit to Stand: 3 trials from BS Chair with HHA, Max A x 2. Pt sat right back down after standing   BS Chair to Bed: maximal assistance and of 2 persons with  rolling walker using Squat Pivot    Co- treatment performed due to patient's multiple deficits requiring two skilled therapists to appropriately and safely assess patient's strength and endurance while facilitating functional tasks in addition to accommodating for patient's activity tolerance      Treatment Type: Treatment  PT/PTA: PTA     Number of PTA visits since last PT visit: 3     2023

## 2023-08-12 PROBLEM — Z75.8 DISCHARGE PLANNING ISSUES: Status: ACTIVE | Noted: 2023-08-12

## 2023-08-12 PROBLEM — Z02.9 DISCHARGE PLANNING ISSUES: Status: ACTIVE | Noted: 2023-08-12

## 2023-08-12 LAB
ANION GAP SERPL CALC-SCNC: 12 MMOL/L (ref 8–16)
BUN SERPL-MCNC: 91 MG/DL (ref 8–23)
CALCIUM SERPL-MCNC: 8.5 MG/DL (ref 8.7–10.5)
CHLORIDE SERPL-SCNC: 98 MMOL/L (ref 95–110)
CO2 SERPL-SCNC: 23 MMOL/L (ref 23–29)
CREAT SERPL-MCNC: 3 MG/DL (ref 0.5–1.4)
EST. GFR  (NO RACE VARIABLE): 21 ML/MIN/1.73 M^2
GLUCOSE SERPL-MCNC: 117 MG/DL (ref 70–110)
POCT GLUCOSE: 129 MG/DL (ref 70–110)
POCT GLUCOSE: 137 MG/DL (ref 70–110)
POCT GLUCOSE: 154 MG/DL (ref 70–110)
POTASSIUM SERPL-SCNC: 4.2 MMOL/L (ref 3.5–5.1)
SODIUM SERPL-SCNC: 133 MMOL/L (ref 136–145)

## 2023-08-12 PROCEDURE — 25000003 PHARM REV CODE 250: Performed by: NURSE PRACTITIONER

## 2023-08-12 PROCEDURE — 25000003 PHARM REV CODE 250: Performed by: STUDENT IN AN ORGANIZED HEALTH CARE EDUCATION/TRAINING PROGRAM

## 2023-08-12 PROCEDURE — 80048 BASIC METABOLIC PNL TOTAL CA: CPT | Performed by: HOSPITALIST

## 2023-08-12 PROCEDURE — 27000221 HC OXYGEN, UP TO 24 HOURS

## 2023-08-12 PROCEDURE — 25000003 PHARM REV CODE 250

## 2023-08-12 PROCEDURE — 25000003 PHARM REV CODE 250: Performed by: HOSPITALIST

## 2023-08-12 PROCEDURE — 36415 COLL VENOUS BLD VENIPUNCTURE: CPT | Performed by: HOSPITALIST

## 2023-08-12 PROCEDURE — 94761 N-INVAS EAR/PLS OXIMETRY MLT: CPT

## 2023-08-12 PROCEDURE — 11000001 HC ACUTE MED/SURG PRIVATE ROOM

## 2023-08-12 RX ADMIN — APIXABAN 5 MG: 5 TABLET, FILM COATED ORAL at 08:08

## 2023-08-12 RX ADMIN — LIDOCAINE 5% 2 PATCH: 700 PATCH TOPICAL at 06:08

## 2023-08-12 RX ADMIN — AMIODARONE HYDROCHLORIDE 200 MG: 200 TABLET ORAL at 08:08

## 2023-08-12 RX ADMIN — CAPSAICIN: 0.25 CREAM TOPICAL at 08:08

## 2023-08-12 RX ADMIN — FUROSEMIDE 80 MG: 40 TABLET ORAL at 05:08

## 2023-08-12 RX ADMIN — ATORVASTATIN CALCIUM 80 MG: 40 TABLET, FILM COATED ORAL at 08:08

## 2023-08-12 RX ADMIN — FOLIC ACID 1 MG: 1 TABLET ORAL at 08:08

## 2023-08-12 RX ADMIN — AMLODIPINE BESYLATE 10 MG: 5 TABLET ORAL at 08:08

## 2023-08-12 RX ADMIN — OXYCODONE HYDROCHLORIDE AND ACETAMINOPHEN 500 MG: 500 TABLET ORAL at 08:08

## 2023-08-12 RX ADMIN — THERA TABS 1 TABLET: TAB at 08:08

## 2023-08-12 RX ADMIN — TAMSULOSIN HYDROCHLORIDE 0.4 MG: 0.4 CAPSULE ORAL at 08:08

## 2023-08-12 RX ADMIN — INSULIN DETEMIR 7 UNITS: 100 INJECTION, SOLUTION SUBCUTANEOUS at 08:08

## 2023-08-12 RX ADMIN — ALLOPURINOL 100 MG: 100 TABLET ORAL at 08:08

## 2023-08-12 RX ADMIN — GABAPENTIN 100 MG: 100 CAPSULE ORAL at 08:08

## 2023-08-12 RX ADMIN — METOPROLOL SUCCINATE 25 MG: 25 TABLET, EXTENDED RELEASE ORAL at 08:08

## 2023-08-12 RX ADMIN — FUROSEMIDE 80 MG: 40 TABLET ORAL at 08:08

## 2023-08-12 NOTE — PLAN OF CARE
Problem: Impaired Wound Healing  Goal: Optimal Wound Healing  Outcome: Ongoing, Progressing  Intervention: Promote Wound Healing  Flowsheets (Taken 8/12/2023 0545)  Sleep/Rest Enhancement: regular sleep/rest pattern promoted  Activity Management: Arm raise - L1     Problem: Skin Injury Risk Increased  Goal: Skin Health and Integrity  Outcome: Ongoing, Progressing  Intervention: Optimize Skin Protection  Flowsheets (Taken 8/12/2023 0545)  Pressure Reduction Techniques:   heels elevated off bed   pressure points protected   weight shift assistance provided  Pressure Reduction Devices: heel offloading device utilized  Skin Protection: incontinence pads utilized  Head of Bed (HOB) Positioning: HOB at 20-30 degrees     Pt. With uneventful night. C/o cough (non-productive) at the beginning of shift, medicated with tessalon pearls as ordered. Very minimal coughing for the remaining shift. Pt. Repositioned and pressure reduction device utilized for the remaining shift. Fall and safety precautions in place and maintained throughout shift.

## 2023-08-12 NOTE — NURSING
Report received, assumed care, assessment per flowsheet complete. Bilat heel boots in place, mepilex to rt. Foot removed and reapplied to assess wound. Mepilex to Gluteal fold and Rt. Buttock changed and wound cleaned with vashe as ordered. Wound prevention measures in place. O2 3LNC in place with no acute resp distress noted. Bed alarm set, call light in reach. Fall and safety precautions in place. Will continue to monitor.   [facility]  Nurses Note -- 4 Eyes      8/11/2023       Skin assessed on: Q Shift      [] No Pressure Injuries Present    []Prevention Measures Documented    [x] Yes LDA  for Pressure Injury Previously documented     [] Yes New Pressure Injury Discovered   [] LDA for New Pressure Injury Added      Attending RN:  Zarina Springer RN     Second RN:  Mandi WETZEL LPN

## 2023-08-12 NOTE — CONSULTS
[facility] Medicine  Telemedicine Consult Note       Patient has been accepted for transfer to [facility] and will be followed through telemedicine services beginning 08/12/23 at 7 AM.        Nhung Martínez MD  [facility] Medicine Staff

## 2023-08-12 NOTE — ASSESSMENT & PLAN NOTE
-Patient noted to be medically stable for discharge at this time  -Patient pending placement, continue in-hospital care as stated above in the meantime  -More than 20 minutes of my time has been spent discussing and planning just her safe disposition with patient/family/CM/SW/Nursing staff (not including other time spent in clinical discussion and management)  -CM/SW following, appreciate input   -Will place DC orders once placement has been secured

## 2023-08-12 NOTE — SUBJECTIVE & OBJECTIVE
Interval History: Pt noted to be afebrile and hemodynamically stable. Stable respiratory status. On 3 L O2 via NC. No acute events overnight. No new complaints this morning. Doing well overall. Eating and drinking well.       Review of Systems   Constitutional:  Positive for activity change and fatigue. Negative for fever.   Respiratory:  Negative for cough and shortness of breath.    Cardiovascular:  Negative for chest pain.   Gastrointestinal:  Negative for abdominal pain.   Musculoskeletal:  Positive for arthralgias, back pain and myalgias.   Neurological:  Negative for dizziness and headaches.   Psychiatric/Behavioral:  Negative for confusion.    All other systems reviewed and are negative.    Objective:     Vital Signs (Most Recent):  Temp: 99 °F (37.2 °C) (08/12/23 1132)  Pulse: 60 (08/12/23 1132)  Resp: 20 (08/12/23 1132)  BP: 127/62 (08/12/23 1132)  SpO2: (!) 91 % (08/12/23 1132) Vital Signs (24h Range):  Temp:  [98 °F (36.7 °C)-99.9 °F (37.7 °C)] 99 °F (37.2 °C)  Pulse:  [59-66] 60  Resp:  [16-25] 20  SpO2:  [90 %-99 %] 91 %  BP: (114-153)/(57-68) 127/62     Weight: 98.9 kg (218 lb)  Body mass index is 30.4 kg/m².    Intake/Output Summary (Last 24 hours) at 8/12/2023 1331  Last data filed at 8/12/2023 0903  Gross per 24 hour   Intake 480 ml   Output 1300 ml   Net -820 ml         Physical Exam  Vitals and nursing note reviewed.   Constitutional:       Appearance: Normal appearance.   HENT:      Head: Normocephalic and atraumatic.   Eyes:      Extraocular Movements: Extraocular movements intact.      Pupils: Pupils are equal, round, and reactive to light.   Cardiovascular:      Rate and Rhythm: Normal rate and regular rhythm.   Pulmonary:      Effort: Pulmonary effort is normal. No respiratory distress.   Abdominal:      General: Abdomen is flat. There is no distension.   Musculoskeletal:         General: Normal range of motion.      Cervical back: Normal range of motion.      Comments: Globally weak    Neurological:      General: No focal deficit present.      Mental Status: He is alert and oriented to person, place, and time.   Psychiatric:         Mood and Affect: Mood normal.         Behavior: Behavior normal.             Significant Labs: All pertinent labs within the past 24 hours have been reviewed.  CBC:   Recent Labs   Lab 08/11/23  0735   WBC 8.78   HGB 7.6*   HCT 24.3*        CMP:   Recent Labs   Lab 08/11/23  0735 08/12/23  0515   * 133*   K 4.4 4.2   CL 99 98   CO2 25 23    117*   BUN 83* 91*   CREATININE 2.7* 3.0*   CALCIUM 8.5* 8.5*   ANIONGAP 10 12       Significant Imaging: I have reviewed all pertinent imaging results/findings within the past 24 hours.

## 2023-08-12 NOTE — PROGRESS NOTES
[facility] Medicine  Telemedicine Progress Note    Patient Name: Chato Bowie  MRN: [id number]  Patient Class: IP- Inpatient   Admission Date: 7/27/2023  Length of Stay: 13 days  Attending Physician: Nhung Martínez MD  Primary Care Provider: Irlanda Valenzuela -          Subjective:     Principal Problem:Chronic diastolic heart failure        HPI:  Chato Bowie 72 y.o. male with CHF, CAD, DM, HTN, HLD, presents to the hospital with a chief complaint of BLE swelling and pain.  onset several weeks ago per chart review, patient was recently discharged from the ED yesterday for similar complaints, and was discharged from inpatient status on 07/25/2023. the patient had endorsed no relief to his complaints since discharge. Patient currently reports complaints of BLE swelling with associated pain. He states his complaints have been ongoing for a few weeks, but had worsened as of recent. He additionally reports he last had similar complaints a month ago, noting he had to be admitted for a couple of nights. He endorses compliance with antihypertensives and diuretics. He reports following with his PCP at [facility], but reports he does not know who is his cardiologist. Denies fever, shortness of breath, or cough.  Patient's niece states that the patient does not live in assisted living however he does need it as she can not provide care for him especially due to him being COVID positive.  No other alleviating or exacerbating factors noted.    In the ED patient was hypertensive (181/140) afebrile without leukocytosis, mild normocytic anemia noted, BUN 46, creatinine 2.7, EGFR 24, glucose 136, albumin 2.3, , TSH WNL, COVID positive, flu negative, UA showed +1 protein, +2 occult blood, CXR was negative for acute process.  Patient was placed in observation      Overview/Hospital Course:  Admission to hospital for BLE edema up hips due to acute on chronic diastolic heart failure and malnutrition.  Patient also found positive COVID 19 -asymptomatic on admission so Remdesivir discontinued. Edema improving with IV diuresis.  Became hypoxic night 7/29 requiring 2 L NC. Started Remdesivir/decadron (7/30), repeat CXR. Hypoglycemia (7/29) improved with oral intake and holding insulin.   He also have left  knee pain and edema similar to previous admission in June where Orthopedic surgery suspected gouty arthritis and attempted aspiration but only able to give steroid injection with improvement. No colchicine due to CKD. Left knee x ray on 7/17/23 showed significant suprapatellar effusion. Due to pain and decrease mobility, will ask Orthopedic Surgery to evaluate.  Hold apixaban  (last dose 7/29 2221).   Hgb treaded down 8.2>7.5>7.1>6.5 without over signs of bleeding. Had normal BM on 7/29 no melena or hematochezia. Previous iron studies low iron/t stat and elevated ferritin, normal vitamin b 12, and low folic acid. Start folic acid, ferrous sulfate and transfuse 1 unit of PRBC.   Recent admission 7/17-7/25 for sepsis due to shingles/cellulitis and ELIZABETH. Right buttock lesion from shingles are all in different part of healing phase without drainage. Also have gluteal cleft excoriation. Continue wound care as recommended by wound care team on previous admission-Cleanse both area with vashe, put mepilex to gluteal cleft, and put hydrogel to shingle lesion.   Also have right upper lateral back below axillary unroof blister and right foot ventral aspect skin tear. Keep clean and dry.     PT/OT evaluation completed. TN/SW to assist with SNF.               Interval History: Pt noted to be afebrile and hemodynamically stable. Stable respiratory status. On 3 L O2 via NC. No acute events overnight. No new complaints this morning. Doing well overall. Eating and drinking well.       Review of Systems   Constitutional:  Positive for activity change and fatigue. Negative for fever.   Respiratory:  Negative for cough and shortness of  breath.    Cardiovascular:  Negative for chest pain.   Gastrointestinal:  Negative for abdominal pain.   Musculoskeletal:  Positive for arthralgias, back pain and myalgias.   Neurological:  Negative for dizziness and headaches.   Psychiatric/Behavioral:  Negative for confusion.    All other systems reviewed and are negative.    Objective:     Vital Signs (Most Recent):  Temp: 99 °F (37.2 °C) (08/12/23 1132)  Pulse: 60 (08/12/23 1132)  Resp: 20 (08/12/23 1132)  BP: 127/62 (08/12/23 1132)  SpO2: (!) 91 % (08/12/23 1132) Vital Signs (24h Range):  Temp:  [98 °F (36.7 °C)-99.9 °F (37.7 °C)] 99 °F (37.2 °C)  Pulse:  [59-66] 60  Resp:  [16-25] 20  SpO2:  [90 %-99 %] 91 %  BP: (114-153)/(57-68) 127/62     Weight: 98.9 kg (218 lb)  Body mass index is 30.4 kg/m².    Intake/Output Summary (Last 24 hours) at 8/12/2023 1331  Last data filed at 8/12/2023 0903  Gross per 24 hour   Intake 480 ml   Output 1300 ml   Net -820 ml         Physical Exam  Vitals and nursing note reviewed.   Constitutional:       Appearance: Normal appearance.   HENT:      Head: Normocephalic and atraumatic.   Eyes:      Extraocular Movements: Extraocular movements intact.      Pupils: Pupils are equal, round, and reactive to light.   Cardiovascular:      Rate and Rhythm: Normal rate and regular rhythm.   Pulmonary:      Effort: Pulmonary effort is normal. No respiratory distress.   Abdominal:      General: Abdomen is flat. There is no distension.   Musculoskeletal:         General: Normal range of motion.      Cervical back: Normal range of motion.      Comments: Globally weak   Neurological:      General: No focal deficit present.      Mental Status: He is alert and oriented to person, place, and time.   Psychiatric:         Mood and Affect: Mood normal.         Behavior: Behavior normal.             Significant Labs: All pertinent labs within the past 24 hours have been reviewed.  CBC:   Recent Labs   Lab 08/11/23  0735   WBC 8.78   HGB 7.6*   HCT 24.3*         CMP:   Recent Labs   Lab 08/11/23  0735 08/12/23  0515   * 133*   K 4.4 4.2   CL 99 98   CO2 25 23    117*   BUN 83* 91*   CREATININE 2.7* 3.0*   CALCIUM 8.5* 8.5*   ANIONGAP 10 12       Significant Imaging: I have reviewed all pertinent imaging results/findings within the past 24 hours.      Assessment/Plan:      * acute on chronic diastolic heart failure  -Admitted to inpatient status  -On admit had severe edema / anasarca - BLE up to hips and abdomen with edema inhibiting ambulation.  -Noted recent 2 D echo in June HFpEF.   -Strict ins/outs and daily weights  -Slowly improving and for first time seeing some wrinkling of skin on legs, but still with significant deep edema in legs. Still no sign of contraction alkalosis, but Cr has bumped up a bit.  -Consult wound care to evaluate for compression wrappings for his legs  -Switched to po lasix 80 mg bid   -Planning NH placement at discharge.    Discharge planning issues    -Patient noted to be medically stable for discharge at this time  -Patient pending placement, continue in-hospital care as stated above in the meantime  -More than 20 minutes of my time has been spent discussing and planning just her safe disposition with patient/family/CM/SW/Nursing staff (not including other time spent in clinical discussion and management)  -CM/SW following, appreciate input   -Will place DC orders once placement has been secured      Effusion of left knee  -X-ray on 07/17 showed significant suprapatellar effusion.  Previous admission in June orthopedic surgery attempted aspiration but no fluid then injected steroid with improvement in pain  -Uric acid remains elevated at 10.5, but notably improved from prior measurements this year  -No colchicine secondary to CKD  -Consulted orthopedic surgery and input appreciated - no intervention at this time  -Today no complaints of pain.  -Added allopurinol 8/3  -Adding lidoderm patches    Multiple wounds  1.  Gluteal cleft - excoriation- Wound care consult from previous admission few days ago-   2. Right buttock lesion from shingles healing at different stages - no vesicles  3. Right foot ventral aspect skin tear- clean with vashe and keep clean JASON  4. Right lateral back below armput few less then eraser size blister. - keep clean dry  -Wound care consulted and input appreciated  -Cleanse with vashe and put hydrogel to right buttock lesions from shingles     COVID-19 virus infection  -Initially asymptomatic now with non productive cough, 7/29 night 89% improve with O2 2 LNC  -CXR showed increase opacities and small pleural effusions  -Treated with dexamethasone, remdsivir, prn tessalon, albuterol inhaler bid and incentive spirometry  -Stopped dexamethasone due to hyperglycemia and he has completed remdesivir  -Clinically his covid infection has resolved and he is breathing comfortably on room air and symptom free  -covid isolation d/c as outside of infectious window    Debility Due to left knee pain and effusion due to gout? and/or injury  -Prior to admission patient used a roller walker for ambulation at baseline.  -On admit patient unable to ambulate even with walker due to swelling of his legs and left knee pain  -Xray of knee showed no acute findings  -orthopedic surgery consulted and input appreciated.  Ortho note 7/30 reviewed - report he does not ambulate and pain is chronic - he uses wheelchair and bed.  Noted L knee effusion felt secondary to arthritis and no evidence of infection or need for aspiration.  -PT/OT consulted and input appreciated.  Recommended SNF which we attempted to get approved, but after peer to peer today that was denied by his insurance company - [employer].    -Discussed with patient 8/6 - states he prefers to go home with HH but not confident it is safe for him.  States he will think about it and discuss with his niece.  -8/7 patient states he would be unable to care for himself at home and  is agreeable to NH placement.    Severe malnutrition  -Added protein supplementations with meals    Anasarca  -Due to HFpEF, CKD, malnutrition, COVID 19 infection and immobility- addressing these issues separately.     CKD (chronic kidney disease), stage IV  -Baseline Cr 2.7-3.0.  -Cr now 2.5.  Still no evidence of contraction alkalosis  -Remains fluid overloaded with edema, but is improving  -Avoid nephrotoxic agents and renally dose meds  -Diurese as above  -Repeat BMP in AM    Paroxysmal atrial flutter  -Stable  -Continue amiodarone, metoprolol and eliquis    BPH (benign prostatic hyperplasia)  -Chronic, stable, continue Flomax    Alcohol abuse  -Patient has a history of alcohol abuse however denies any recent used in the past week.    -No signs of DT this admission     Anemia  -Hgb drop this admission and compared to last month hgb 10-11 range.   -No melena or hematochezia noted.  -3/2016 EGD normal esophagus and erythematous stomach and duodenum but no mention ulcers/erosions  -CKD IV also contributing   -Low iron and t stat elevated ferritin, normal vitamin b 12, low folic acid  -Held eliquis initially but now resumed.  -Received 1 unit PRBC and added PPI.  -Started folic acid, ferrous sulfate  -Hb stable    Type 2 diabetes mellitus with kidney complication, with long-term current use of insulin  -A1c 7.8  -Noted significant hyperglycemia earlier in stay   -Improved but with persistent morning hypoglycemia down to 70s on morning of 8/4, 54 on 8/5 and 62 today  -Stop scheduled aspart.  Change detemir to 7 units bid for now.  Continue SSI ac/hs    Dyslipidemia  -Chronic, stable, substitute for atorvastatin while in the hospital    Essential hypertension  -BP well controlled  -Continue home antihypertensives-amlodipine, hydralazine, metoprolol with parameters  -Prn hydralazine SBP >180    Fever  Pt with 101.2 fever overnight of 8/9.. CXR at the time with subtle findings, but no obvious infiltrate. No sxs  currently. No leukocytosis, negative urine, procal negative. No repeat fevers. Does not appear to be infectious in origin      VTE Risk Mitigation (From admission, onward)         Ordered     apixaban tablet 5 mg  2 times daily         08/01/23 1648     Place MASOOD hose  Until discontinued         07/28/23 0719     IP VTE HIGH RISK PATIENT  Once         07/27/23 2326     Place sequential compression device  Until discontinued         07/27/23 2326     Reason for No Pharmacological VTE Prophylaxis  Once        Question:  Reasons:  Answer:  Already adequately anticoagulated on oral Anticoagulants    07/27/23 2326                      I have completed this tele-visit without the assistance of a telepresenter.    The attending portion of this evaluation, treatment, and documentation was performed per Nhung Martínez MD via Telemedicine AudioVisual using the secure [employer] software platform with 2 way audio/video. The provider was located off-site and the patient is located in the hospital. The aforementioned video software was utilized to document the relevant history and physical exam    Nhung Martínez MD  Department of Hospital Medicine   [facility]

## 2023-08-12 NOTE — NURSING
[facility]  Nurses Note -- 4 Eyes      8/12/2023       Skin assessed on: Q Shift      [] No Pressure Injuries Present    []Prevention Measures Documented    [x] Yes LDA  for Pressure Injury Previously documented     [] Yes New Pressure Injury Discovered   [] LDA for New Pressure Injury Added      Attending RN:  Pavithra Watkins, JOYCE     Second RN:  Zarina Springer RN

## 2023-08-13 LAB
ANION GAP SERPL CALC-SCNC: 10 MMOL/L (ref 8–16)
BUN SERPL-MCNC: 91 MG/DL (ref 8–23)
CALCIUM SERPL-MCNC: 8.7 MG/DL (ref 8.7–10.5)
CHLORIDE SERPL-SCNC: 100 MMOL/L (ref 95–110)
CO2 SERPL-SCNC: 25 MMOL/L (ref 23–29)
CREAT SERPL-MCNC: 2.8 MG/DL (ref 0.5–1.4)
EST. GFR  (NO RACE VARIABLE): 23 ML/MIN/1.73 M^2
GLUCOSE SERPL-MCNC: 81 MG/DL (ref 70–110)
POCT GLUCOSE: 132 MG/DL (ref 70–110)
POCT GLUCOSE: 151 MG/DL (ref 70–110)
POCT GLUCOSE: 184 MG/DL (ref 70–110)
POTASSIUM SERPL-SCNC: 3.9 MMOL/L (ref 3.5–5.1)
SODIUM SERPL-SCNC: 135 MMOL/L (ref 136–145)

## 2023-08-13 PROCEDURE — 27000221 HC OXYGEN, UP TO 24 HOURS

## 2023-08-13 PROCEDURE — 11000001 HC ACUTE MED/SURG PRIVATE ROOM

## 2023-08-13 PROCEDURE — 25000003 PHARM REV CODE 250: Performed by: NURSE PRACTITIONER

## 2023-08-13 PROCEDURE — 94640 AIRWAY INHALATION TREATMENT: CPT

## 2023-08-13 PROCEDURE — 25000242 PHARM REV CODE 250 ALT 637 W/ HCPCS: Performed by: NURSE PRACTITIONER

## 2023-08-13 PROCEDURE — 25000003 PHARM REV CODE 250: Performed by: STUDENT IN AN ORGANIZED HEALTH CARE EDUCATION/TRAINING PROGRAM

## 2023-08-13 PROCEDURE — 94761 N-INVAS EAR/PLS OXIMETRY MLT: CPT

## 2023-08-13 PROCEDURE — 80048 BASIC METABOLIC PNL TOTAL CA: CPT | Performed by: HOSPITALIST

## 2023-08-13 PROCEDURE — 25000003 PHARM REV CODE 250

## 2023-08-13 PROCEDURE — 25000003 PHARM REV CODE 250: Performed by: HOSPITALIST

## 2023-08-13 PROCEDURE — 36415 COLL VENOUS BLD VENIPUNCTURE: CPT | Performed by: HOSPITALIST

## 2023-08-13 RX ADMIN — OXYCODONE HYDROCHLORIDE AND ACETAMINOPHEN 500 MG: 500 TABLET ORAL at 09:08

## 2023-08-13 RX ADMIN — AMIODARONE HYDROCHLORIDE 200 MG: 200 TABLET ORAL at 09:08

## 2023-08-13 RX ADMIN — ALBUTEROL SULFATE 2.5 MG: 2.5 SOLUTION RESPIRATORY (INHALATION) at 08:08

## 2023-08-13 RX ADMIN — APIXABAN 5 MG: 5 TABLET, FILM COATED ORAL at 09:08

## 2023-08-13 RX ADMIN — ALLOPURINOL 100 MG: 100 TABLET ORAL at 09:08

## 2023-08-13 RX ADMIN — INSULIN ASPART 1 UNITS: 100 INJECTION, SOLUTION INTRAVENOUS; SUBCUTANEOUS at 09:08

## 2023-08-13 RX ADMIN — THERA TABS 1 TABLET: TAB at 09:08

## 2023-08-13 RX ADMIN — ATORVASTATIN CALCIUM 80 MG: 40 TABLET, FILM COATED ORAL at 09:08

## 2023-08-13 RX ADMIN — GABAPENTIN 100 MG: 100 CAPSULE ORAL at 09:08

## 2023-08-13 RX ADMIN — FOLIC ACID 1 MG: 1 TABLET ORAL at 09:08

## 2023-08-13 RX ADMIN — CAPSAICIN: 0.25 CREAM TOPICAL at 09:08

## 2023-08-13 RX ADMIN — INSULIN DETEMIR 7 UNITS: 100 INJECTION, SOLUTION SUBCUTANEOUS at 09:08

## 2023-08-13 RX ADMIN — LIDOCAINE 5% 2 PATCH: 700 PATCH TOPICAL at 05:08

## 2023-08-13 RX ADMIN — FUROSEMIDE 80 MG: 40 TABLET ORAL at 05:08

## 2023-08-13 RX ADMIN — TAMSULOSIN HYDROCHLORIDE 0.4 MG: 0.4 CAPSULE ORAL at 09:08

## 2023-08-13 RX ADMIN — BENZONATATE 100 MG: 100 CAPSULE ORAL at 09:08

## 2023-08-13 RX ADMIN — AMLODIPINE BESYLATE 10 MG: 5 TABLET ORAL at 09:08

## 2023-08-13 RX ADMIN — METOPROLOL SUCCINATE 25 MG: 25 TABLET, EXTENDED RELEASE ORAL at 09:08

## 2023-08-13 RX ADMIN — FUROSEMIDE 80 MG: 40 TABLET ORAL at 09:08

## 2023-08-13 NOTE — SUBJECTIVE & OBJECTIVE
Interval History: Pt noted to be afebrile and hemodynamically stable. Stable respiratory status. On 3 L O2 via NC. No acute events overnight. No new complaints this morning. Doing well overall. Eating and drinking well. Having regular Bms. Some chronic knee pain. Pending placement.       Review of Systems   Constitutional:  Positive for activity change and fatigue. Negative for fever.   Respiratory:  Negative for cough and shortness of breath.    Cardiovascular:  Negative for chest pain.   Gastrointestinal:  Negative for abdominal pain.   Musculoskeletal:  Positive for arthralgias, back pain and myalgias.   Neurological:  Negative for dizziness and headaches.   Psychiatric/Behavioral:  Negative for confusion.    All other systems reviewed and are negative.    Objective:     Vital Signs (Most Recent):  Temp: 98.3 °F (36.8 °C) (08/13/23 1109)  Pulse: 60 (08/13/23 1109)  Resp: 20 (08/13/23 1109)  BP: (!) 144/65 (08/13/23 1109)  SpO2: 97 % (08/13/23 1109) Vital Signs (24h Range):  Temp:  [97.9 °F (36.6 °C)-99.9 °F (37.7 °C)] 98.3 °F (36.8 °C)  Pulse:  [58-61] 60  Resp:  [20-22] 20  SpO2:  [91 %-97 %] 97 %  BP: (127-159)/(58-72) 144/65     Weight: 102.5 kg (226 lb)  Body mass index is 31.52 kg/m².    Intake/Output Summary (Last 24 hours) at 8/13/2023 1227  Last data filed at 8/13/2023 0830  Gross per 24 hour   Intake 600 ml   Output 800 ml   Net -200 ml           Physical Exam  Vitals and nursing note reviewed.   Constitutional:       Appearance: Normal appearance.   HENT:      Head: Normocephalic and atraumatic.   Eyes:      Extraocular Movements: Extraocular movements intact.      Pupils: Pupils are equal, round, and reactive to light.   Cardiovascular:      Rate and Rhythm: Normal rate and regular rhythm.   Pulmonary:      Effort: Pulmonary effort is normal. No respiratory distress.   Abdominal:      General: Abdomen is flat. There is no distension.   Musculoskeletal:         General: Normal range of motion.       "Cervical back: Normal range of motion.      Comments: Globally weak   Neurological:      General: No focal deficit present.      Mental Status: He is alert and oriented to person, place, and time.   Psychiatric:         Mood and Affect: Mood normal.         Behavior: Behavior normal.             Significant Labs: All pertinent labs within the past 24 hours have been reviewed.  CBC:   No results for input(s): "WBC", "HGB", "HCT", "PLT" in the last 48 hours.    CMP:   Recent Labs   Lab 08/12/23  0515 08/13/23  0431   * 135*   K 4.2 3.9   CL 98 100   CO2 23 25   * 81   BUN 91* 91*   CREATININE 3.0* 2.8*   CALCIUM 8.5* 8.7   ANIONGAP 12 10         Significant Imaging: I have reviewed all pertinent imaging results/findings within the past 24 hours.  "

## 2023-08-13 NOTE — PLAN OF CARE
Problem: Diabetes Comorbidity  Goal: Blood Glucose Level Within Targeted Range  Intervention: Monitor and Manage Glycemia  Flowsheets (Taken 8/13/2023 0256)  Glycemic Management:   blood glucose monitored   supplemental insulin given     Problem: Skin Injury Risk Increased  Goal: Skin Health and Integrity  Intervention: Optimize Skin Protection  Flowsheets (Taken 8/13/2023 0256)  Pressure Reduction Techniques:   frequent weight shift encouraged   pressure points protected  Pressure Reduction Devices: foam padding utilized  Skin Protection: incontinence pads utilized  Intervention: Promote and Optimize Oral Intake  Flowsheets (Taken 8/13/2023 0256)  Oral Nutrition Promotion: rest periods promoted

## 2023-08-13 NOTE — ASSESSMENT & PLAN NOTE
-Baseline Cr 2.7-3.0.  -Cr now stable around baseline  -Remains fluid overloaded with edema, but is improving. Continue leg wraps and oral diuretics   -Avoid nephrotoxic agents and renally dose meds  -Diurese as above  -Repeat BMP in AM

## 2023-08-13 NOTE — ASSESSMENT & PLAN NOTE
-Prior to admission patient used a roller walker for ambulation at baseline.  -On admit patient unable to ambulate even with walker due to swelling of his legs and left knee pain  -Xray of knee showed no acute findings  -orthopedic surgery consulted and input appreciated.  Ortho note 7/30 reviewed - report he does not ambulate and pain is chronic - he uses wheelchair and bed.  Noted L knee effusion felt secondary to arthritis and no evidence of infection or need for aspiration.  -PT/OT consulted and input appreciated.  Recommended SNF which we attempted to get approved, but after peer to peer today that was denied by his insurance company - [employer].    -Discussed with patient 8/6 - states he prefers to go home with HH but not confident it is safe for him.  States he will think about it and discuss with his niece.  -8/7 patient states he would be unable to care for himself at home and is agreeable to NH placement.  -medically stbale for dc, pending placement

## 2023-08-13 NOTE — ASSESSMENT & PLAN NOTE
-X-ray on 07/17 showed significant suprapatellar effusion.  Previous admission in June orthopedic surgery attempted aspiration but no fluid then injected steroid with improvement in pain  -Uric acid remains elevated at 10.5, but notably improved from prior measurements this year  -No colchicine secondary to CKD  -Consulted orthopedic surgery and input appreciated - no intervention at this time  -Today no complaints of pain.  -Added allopurinol 8/3  -Adding lidoderm patches  -continues to complain of knee pain. Continue judicious pain control

## 2023-08-13 NOTE — ASSESSMENT & PLAN NOTE
-Initially asymptomatic now with non productive cough, 7/29 night 89% improve with O2 2 LNC  -CXR showed increase opacities and small pleural effusions  -Treated with dexamethasone, remdsivir, prn tessalon, albuterol inhaler bid and incentive spirometry  -Stopped dexamethasone due to hyperglycemia and he has completed remdesivir  -Clinically his covid infection has resolved and he is breathing comfortably on room air and symptom free  -covid isolation d/c as outside of infectious window  -stable respiratory status

## 2023-08-13 NOTE — PROGRESS NOTES
[facility] Medicine  Telemedicine Progress Note    Patient Name: Chato Bowie  MRN: [id number]  Patient Class: IP- Inpatient   Admission Date: 7/27/2023  Length of Stay: 14 days  Attending Physician: Nhung Martínez MD  Primary Care Provider: Irlanda Valenzuela -          Subjective:     Principal Problem:Chronic diastolic heart failure        HPI:  Chato Bowie 72 y.o. male with CHF, CAD, DM, HTN, HLD, presents to the hospital with a chief complaint of BLE swelling and pain.  onset several weeks ago per chart review, patient was recently discharged from the ED yesterday for similar complaints, and was discharged from inpatient status on 07/25/2023. the patient had endorsed no relief to his complaints since discharge. Patient currently reports complaints of BLE swelling with associated pain. He states his complaints have been ongoing for a few weeks, but had worsened as of recent. He additionally reports he last had similar complaints a month ago, noting he had to be admitted for a couple of nights. He endorses compliance with antihypertensives and diuretics. He reports following with his PCP at [facility], but reports he does not know who is his cardiologist. Denies fever, shortness of breath, or cough.  Patient's niece states that the patient does not live in assisted living however he does need it as she can not provide care for him especially due to him being COVID positive.  No other alleviating or exacerbating factors noted.    In the ED patient was hypertensive (181/140) afebrile without leukocytosis, mild normocytic anemia noted, BUN 46, creatinine 2.7, EGFR 24, glucose 136, albumin 2.3, , TSH WNL, COVID positive, flu negative, UA showed +1 protein, +2 occult blood, CXR was negative for acute process.  Patient was placed in observation      Overview/Hospital Course:  Admission to hospital for BLE edema up hips due to acute on chronic diastolic heart failure and malnutrition.  Patient also found positive COVID 19 -asymptomatic on admission so Remdesivir discontinued. Edema improving with IV diuresis.  Became hypoxic night 7/29 requiring 2 L NC. Started Remdesivir/decadron (7/30), repeat CXR. Hypoglycemia (7/29) improved with oral intake and holding insulin.   He also have left  knee pain and edema similar to previous admission in June where Orthopedic surgery suspected gouty arthritis and attempted aspiration but only able to give steroid injection with improvement. No colchicine due to CKD. Left knee x ray on 7/17/23 showed significant suprapatellar effusion. Due to pain and decrease mobility, will ask Orthopedic Surgery to evaluate.  Hold apixaban  (last dose 7/29 2221).   Hgb treaded down 8.2>7.5>7.1>6.5 without over signs of bleeding. Had normal BM on 7/29 no melena or hematochezia. Previous iron studies low iron/t stat and elevated ferritin, normal vitamin b 12, and low folic acid. Start folic acid, ferrous sulfate and transfuse 1 unit of PRBC.   Recent admission 7/17-7/25 for sepsis due to shingles/cellulitis and ELIZABETH. Right buttock lesion from shingles are all in different part of healing phase without drainage. Also have gluteal cleft excoriation. Continue wound care as recommended by wound care team on previous admission-Cleanse both area with vashe, put mepilex to gluteal cleft, and put hydrogel to shingle lesion.   Also have right upper lateral back below axillary unroof blister and right foot ventral aspect skin tear. Keep clean and dry.     PT/OT evaluation completed. TN/SW to assist with SNF.               Interval History: Pt noted to be afebrile and hemodynamically stable. Stable respiratory status. On 3 L O2 via NC. No acute events overnight. No new complaints this morning. Doing well overall. Eating and drinking well. Having regular Bms. Some chronic knee pain. Pending placement.       Review of Systems   Constitutional:  Positive for activity change and fatigue. Negative  for fever.   Respiratory:  Negative for cough and shortness of breath.    Cardiovascular:  Negative for chest pain.   Gastrointestinal:  Negative for abdominal pain.   Musculoskeletal:  Positive for arthralgias, back pain and myalgias.   Neurological:  Negative for dizziness and headaches.   Psychiatric/Behavioral:  Negative for confusion.    All other systems reviewed and are negative.    Objective:     Vital Signs (Most Recent):  Temp: 98.3 °F (36.8 °C) (08/13/23 1109)  Pulse: 60 (08/13/23 1109)  Resp: 20 (08/13/23 1109)  BP: (!) 144/65 (08/13/23 1109)  SpO2: 97 % (08/13/23 1109) Vital Signs (24h Range):  Temp:  [97.9 °F (36.6 °C)-99.9 °F (37.7 °C)] 98.3 °F (36.8 °C)  Pulse:  [58-61] 60  Resp:  [20-22] 20  SpO2:  [91 %-97 %] 97 %  BP: (127-159)/(58-72) 144/65     Weight: 102.5 kg (226 lb)  Body mass index is 31.52 kg/m².    Intake/Output Summary (Last 24 hours) at 8/13/2023 1227  Last data filed at 8/13/2023 0830  Gross per 24 hour   Intake 600 ml   Output 800 ml   Net -200 ml           Physical Exam  Vitals and nursing note reviewed.   Constitutional:       Appearance: Normal appearance.   HENT:      Head: Normocephalic and atraumatic.   Eyes:      Extraocular Movements: Extraocular movements intact.      Pupils: Pupils are equal, round, and reactive to light.   Cardiovascular:      Rate and Rhythm: Normal rate and regular rhythm.   Pulmonary:      Effort: Pulmonary effort is normal. No respiratory distress.   Abdominal:      General: Abdomen is flat. There is no distension.   Musculoskeletal:         General: Normal range of motion.      Cervical back: Normal range of motion.      Comments: Globally weak   Neurological:      General: No focal deficit present.      Mental Status: He is alert and oriented to person, place, and time.   Psychiatric:         Mood and Affect: Mood normal.         Behavior: Behavior normal.             Significant Labs: All pertinent labs within the past 24 hours have been  "reviewed.  CBC:   No results for input(s): "WBC", "HGB", "HCT", "PLT" in the last 48 hours.    CMP:   Recent Labs   Lab 08/12/23  0515 08/13/23  0431   * 135*   K 4.2 3.9   CL 98 100   CO2 23 25   * 81   BUN 91* 91*   CREATININE 3.0* 2.8*   CALCIUM 8.5* 8.7   ANIONGAP 12 10         Significant Imaging: I have reviewed all pertinent imaging results/findings within the past 24 hours.      Assessment/Plan:      * acute on chronic diastolic heart failure  -Admitted to inpatient status  -On admit had severe edema / anasarca - BLE up to hips and abdomen with edema inhibiting ambulation.  -Noted recent 2 D echo in June HFpEF.   -Strict ins/outs and daily weights  -Slowly improving and for first time seeing some wrinkling of skin on legs, but still with significant deep edema in legs. Still no sign of contraction alkalosis, but Cr has bumped up a bit.  -Consult wound care to evaluate for compression wrappings for his legs  -Switched to po lasix 80 mg bid   -Planning NH placement at discharge.    Discharge planning issues    -Patient noted to be medically stable for discharge at this time  -Patient pending placement, continue in-hospital care as stated above in the meantime  -More than 20 minutes of my time has been spent discussing and planning just her safe disposition with patient/family/CM/SW/Nursing staff (not including other time spent in clinical discussion and management)  -CM/SW following, appreciate input   -Will place DC orders once placement has been secured      Effusion of left knee  -X-ray on 07/17 showed significant suprapatellar effusion.  Previous admission in June orthopedic surgery attempted aspiration but no fluid then injected steroid with improvement in pain  -Uric acid remains elevated at 10.5, but notably improved from prior measurements this year  -No colchicine secondary to CKD  -Consulted orthopedic surgery and input appreciated - no intervention at this time  -Today no complaints of " pain.  -Added allopurinol 8/3  -Adding lidoderm patches  -continues to complain of knee pain. Continue judicious pain control     Multiple wounds  1. Gluteal cleft - excoriation- Wound care consult from previous admission few days ago-   2. Right buttock lesion from shingles healing at different stages - no vesicles  3. Right foot ventral aspect skin tear- clean with vashe and keep clean JASON  4. Right lateral back below armput few less then eraser size blister. - keep clean dry  -Wound care consulted and input appreciated  -Cleanse with vashe and put hydrogel to right buttock lesions from shingles     COVID-19 virus infection  -Initially asymptomatic now with non productive cough, 7/29 night 89% improve with O2 2 LNC  -CXR showed increase opacities and small pleural effusions  -Treated with dexamethasone, remdsivir, prn tessalon, albuterol inhaler bid and incentive spirometry  -Stopped dexamethasone due to hyperglycemia and he has completed remdesivir  -Clinically his covid infection has resolved and he is breathing comfortably on room air and symptom free  -covid isolation d/c as outside of infectious window  -stable respiratory status     Debility Due to left knee pain and effusion due to gout? and/or injury  -Prior to admission patient used a roller walker for ambulation at baseline.  -On admit patient unable to ambulate even with walker due to swelling of his legs and left knee pain  -Xray of knee showed no acute findings  -orthopedic surgery consulted and input appreciated.  Ortho note 7/30 reviewed - report he does not ambulate and pain is chronic - he uses wheelchair and bed.  Noted L knee effusion felt secondary to arthritis and no evidence of infection or need for aspiration.  -PT/OT consulted and input appreciated.  Recommended SNF which we attempted to get approved, but after peer to peer today that was denied by his insurance company - [employer].    -Discussed with patient 8/6 - states he prefers to go home  with HH but not confident it is safe for him.  States he will think about it and discuss with his niece.  -8/7 patient states he would be unable to care for himself at home and is agreeable to NH placement.  -medically stbale for dc, pending placement     Severe malnutrition  -Added protein supplementations with meals    Anasarca  -Due to HFpEF, CKD, malnutrition, COVID 19 infection and immobility- addressing these issues separately.     CKD (chronic kidney disease), stage IV  -Baseline Cr 2.7-3.0.  -Cr now stable around baseline  -Remains fluid overloaded with edema, but is improving. Continue leg wraps and oral diuretics   -Avoid nephrotoxic agents and renally dose meds  -Diurese as above  -Repeat BMP in AM    Paroxysmal atrial flutter  -Stable  -Continue amiodarone, metoprolol and eliquis    BPH (benign prostatic hyperplasia)  -Chronic, stable, continue Flomax    Alcohol abuse  -Patient has a history of alcohol abuse however denies any recent used in the past week.    -No signs of DT this admission     Anemia  -Hgb drop this admission and compared to last month hgb 10-11 range.   -No melena or hematochezia noted.  -3/2016 EGD normal esophagus and erythematous stomach and duodenum but no mention ulcers/erosions  -CKD IV also contributing   -Low iron and t stat elevated ferritin, normal vitamin b 12, low folic acid  -Held eliquis initially but now resumed.  -Received 1 unit PRBC and added PPI.  -Started folic acid, ferrous sulfate  -Hb stable    Type 2 diabetes mellitus with kidney complication, with long-term current use of insulin  -A1c 7.8  -Noted significant hyperglycemia earlier in stay   -Improved but with persistent morning hypoglycemia down to 70s on morning of 8/4, 54 on 8/5 and 62 today  -Stop scheduled aspart.  Change detemir to 7 units bid for now.  Continue SSI ac/hs    Dyslipidemia  -Chronic, stable, substitute for atorvastatin while in the hospital    Essential hypertension  -BP well  controlled  -Continue home antihypertensives-amlodipine, hydralazine, metoprolol with parameters  -Prn hydralazine SBP >180    Fever  Pt with 101.2 fever overnight of 8/9.. CXR at the time with subtle findings, but no obvious infiltrate. No sxs currently. No leukocytosis, negative urine, procal negative. No repeat fevers. Does not appear to be infectious in origin      VTE Risk Mitigation (From admission, onward)         Ordered     apixaban tablet 5 mg  2 times daily         08/01/23 1648     Place MASOOD hose  Until discontinued         07/28/23 0719     IP VTE HIGH RISK PATIENT  Once         07/27/23 2326     Place sequential compression device  Until discontinued         07/27/23 2326     Reason for No Pharmacological VTE Prophylaxis  Once        Question:  Reasons:  Answer:  Already adequately anticoagulated on oral Anticoagulants    07/27/23 2326                      I have completed this tele-visit without the assistance of a telepresenter.    The attending portion of this evaluation, treatment, and documentation was performed per Nhung Martínez MD via Telemedicine AudioVisual using the secure trinket software platform with 2 way audio/video. The provider was located off-site and the patient is located in the hospital. The aforementioned video software was utilized to document the relevant history and physical exam    Nhung Martínez MD  Department of Hospital Medicine   [facility] Surg

## 2023-08-13 NOTE — NURSING
[facility], [facility]  Nurses Note -- 4 Eyes      8/13/2023       Skin assessed on: Q Shift      [x] No Pressure Injuries Present    []Prevention Measures Documented    [] Yes LDA  for Pressure Injury Previously documented     [] Yes New Pressure Injury Discovered   [] LDA for New Pressure Injury Added      Attending RN:  Clary Mejia RN     Second RN:  angel luis lim

## 2023-08-14 LAB
BACTERIA BLD CULT: NORMAL
BACTERIA BLD CULT: NORMAL
POCT GLUCOSE: 111 MG/DL (ref 70–110)
POCT GLUCOSE: 173 MG/DL (ref 70–110)
POCT GLUCOSE: 197 MG/DL (ref 70–110)
POCT GLUCOSE: 218 MG/DL (ref 70–110)

## 2023-08-14 PROCEDURE — 25000003 PHARM REV CODE 250: Performed by: NURSE PRACTITIONER

## 2023-08-14 PROCEDURE — 11000001 HC ACUTE MED/SURG PRIVATE ROOM

## 2023-08-14 PROCEDURE — 25000003 PHARM REV CODE 250: Performed by: STUDENT IN AN ORGANIZED HEALTH CARE EDUCATION/TRAINING PROGRAM

## 2023-08-14 PROCEDURE — 97530 THERAPEUTIC ACTIVITIES: CPT | Mod: CQ

## 2023-08-14 PROCEDURE — 25000003 PHARM REV CODE 250: Performed by: HOSPITALIST

## 2023-08-14 PROCEDURE — 63600175 PHARM REV CODE 636 W HCPCS

## 2023-08-14 PROCEDURE — 25000003 PHARM REV CODE 250

## 2023-08-14 PROCEDURE — 97110 THERAPEUTIC EXERCISES: CPT | Mod: CQ

## 2023-08-14 PROCEDURE — 97535 SELF CARE MNGMENT TRAINING: CPT

## 2023-08-14 PROCEDURE — 97530 THERAPEUTIC ACTIVITIES: CPT

## 2023-08-14 RX ADMIN — ALLOPURINOL 100 MG: 100 TABLET ORAL at 08:08

## 2023-08-14 RX ADMIN — OXYCODONE HYDROCHLORIDE AND ACETAMINOPHEN 500 MG: 500 TABLET ORAL at 09:08

## 2023-08-14 RX ADMIN — METOPROLOL SUCCINATE 25 MG: 25 TABLET, EXTENDED RELEASE ORAL at 08:08

## 2023-08-14 RX ADMIN — GABAPENTIN 100 MG: 100 CAPSULE ORAL at 09:08

## 2023-08-14 RX ADMIN — INSULIN ASPART 2 UNITS: 100 INJECTION, SOLUTION INTRAVENOUS; SUBCUTANEOUS at 11:08

## 2023-08-14 RX ADMIN — AMIODARONE HYDROCHLORIDE 200 MG: 200 TABLET ORAL at 08:08

## 2023-08-14 RX ADMIN — GABAPENTIN 100 MG: 100 CAPSULE ORAL at 08:08

## 2023-08-14 RX ADMIN — ATORVASTATIN CALCIUM 80 MG: 40 TABLET, FILM COATED ORAL at 08:08

## 2023-08-14 RX ADMIN — LIDOCAINE 5% 2 PATCH: 700 PATCH TOPICAL at 05:08

## 2023-08-14 RX ADMIN — CAPSAICIN: 0.25 CREAM TOPICAL at 09:08

## 2023-08-14 RX ADMIN — OXYCODONE HYDROCHLORIDE AND ACETAMINOPHEN 500 MG: 500 TABLET ORAL at 08:08

## 2023-08-14 RX ADMIN — HYDROCODONE BITARTRATE AND ACETAMINOPHEN 1 TABLET: 5; 325 TABLET ORAL at 04:08

## 2023-08-14 RX ADMIN — FOLIC ACID 1 MG: 1 TABLET ORAL at 08:08

## 2023-08-14 RX ADMIN — APIXABAN 5 MG: 5 TABLET, FILM COATED ORAL at 08:08

## 2023-08-14 RX ADMIN — FUROSEMIDE 80 MG: 40 TABLET ORAL at 04:08

## 2023-08-14 RX ADMIN — FUROSEMIDE 80 MG: 40 TABLET ORAL at 08:08

## 2023-08-14 RX ADMIN — THERA TABS 1 TABLET: TAB at 08:08

## 2023-08-14 RX ADMIN — TAMSULOSIN HYDROCHLORIDE 0.4 MG: 0.4 CAPSULE ORAL at 08:08

## 2023-08-14 RX ADMIN — AMLODIPINE BESYLATE 10 MG: 5 TABLET ORAL at 08:08

## 2023-08-14 RX ADMIN — INSULIN ASPART 2 UNITS: 100 INJECTION, SOLUTION INTRAVENOUS; SUBCUTANEOUS at 04:08

## 2023-08-14 RX ADMIN — INSULIN DETEMIR 7 UNITS: 100 INJECTION, SOLUTION SUBCUTANEOUS at 09:08

## 2023-08-14 RX ADMIN — APIXABAN 5 MG: 5 TABLET, FILM COATED ORAL at 09:08

## 2023-08-14 RX ADMIN — INSULIN ASPART 2 UNITS: 100 INJECTION, SOLUTION INTRAVENOUS; SUBCUTANEOUS at 09:08

## 2023-08-14 RX ADMIN — INSULIN DETEMIR 7 UNITS: 100 INJECTION, SOLUTION SUBCUTANEOUS at 08:08

## 2023-08-14 NOTE — PLAN OF CARE
Problem: Occupational Therapy  Goal: Occupational Therapy Goal  Description: Goals to be met by: 8/18/23     Patient will increase functional independence with ADLs by performing:    UE Dressing with [place].  LE Dressing with Modified [place].  Grooming while seated at sink with [place]. REVISE  [place] at EOB with modified independence.   Toileting from toilet with Modified [place] for hygiene and clothing management.   Bathing from  edge of bed with Minimal Assistance.  Toilet transfer to toilet with Modified [place].  Upper extremity exercise program x10-15 reps per handout, with supervision.    Outcome: Ongoing, Progressing   Patient sat EOB to groom with setup today.

## 2023-08-14 NOTE — PLAN OF CARE
08/08/23 1115   Medicare Message   Important Message from Medicare regarding Discharge Appeal Rights Given to patient/caregiver;Explained to patient/caregiver;Other (comments)  (TN spoke with Leigh Evans (niece)   [phone], verbalized understanding. Copy mailed certified to address in chart. [phone])   Date IMM was signed 08/08/23   Time IMM was signed 0297

## 2023-08-14 NOTE — PLAN OF CARE
Recommendations    Continue to monitor PO intake of meals and snacks  If intake remains below 50%, Consider ONS Boost to help meet EEN/EPN  Continue to monitor weights and labs  Collaboration with medical providers    Goals: 1. Pt to meet % EEN/EPN by RD follow up  Nutrition Goal Status: new  Communication of RD Recs:  (POC)    Assessment and Plan    Nutrition Problem  No nutrition dx at this time

## 2023-08-14 NOTE — PLAN OF CARE
Case Management Re-assessment      [facility]  Pharmacy: [id number] - [place], [place] - [address]        Patient Arrived From: Home  Existing Help at Home: Jyoti [phone]      Barriers to Discharge: Financials for senior care placement     Discharge Plan:               A. New [facility] NH placement              B. Home health    Pt's niece provided TN with patient's financial documentation via email. Spoke with patient's niece, stated she is agreeable for pt to be placed at either [facility] or [facility] in [place], [place].    Spoke with patient at bedside regarding NH placement. Reviewed acceptance for [facility] and [facility] in [place], [place]. TN explained that documents provided from pt's niece to be forwarded to [facility] for review. Plan B would be placement with [facility] in [place] to assist with financials in the event [facility] would require additional information not provided.     Placed call to [facility],[phone], left detailed message for Ebonie, awaiting call back.     11:10am Received return call from Ebonie with [facility], stated the facility does have bed availability for today and reached out to pt's niece Leigh, however, she was unwilling to complete admission paperwork. Per Ebonie pt will need to complete paperwork prior to admission to the facility and will email the packet to [email or web address]. TN emailed financial documents received from pt's niece to Ebonie at [email or web address] for review.    08/14/23 1021   Discharge Reassessment   Assessment Type Discharge Planning Reassessment   Did the patient's condition or plan change since previous assessment? No   Discharge Plan discussed with: Patient   Communicated ELY with patient/caregiver Yes   DME Needed Upon Discharge  none   Transition of Care Barriers Social;Other (see comments)   Why the patient remains in the hospital  Placement issues   Post-Acute Status   Post-Acute Authorization Placement   Post-Acute Placement Status Pending post-acute provider review/more information requested   Discharge Delays (!) Post-Acute Set-up

## 2023-08-14 NOTE — PT/OT/SLP PROGRESS
Physical Therapy Treatment    Patient Name:  Chato Bowie   MRN:  [id number]    Recommendations:     Discharge Recommendations: nursing facility, skilled  Discharge Equipment Recommendations: none  Barriers to discharge:  pt is not functioning at Independent baseline and is at increased risk for falls and readmission if he returns to prior living arrangements at present     Assessment:     Chato Bowie is a 72 y.o. male admitted with a medical diagnosis of Chronic diastolic heart failure.  He presents with the following impairments/functional limitations: weakness, impaired endurance, impaired self care skills, impaired functional mobility, gait instability, impaired balance, decreased coordination, decreased upper extremity function, decreased lower extremity function, decreased safety awareness, pain, decreased ROM, impaired skin, edema, impaired cardiopulmonary response to activity.    Rehab Prognosis: Good and Fair; patient would benefit from acute skilled PT services to address these deficits and reach maximum level of function.    Recent Surgery: * No surgery found *      Plan:     During this hospitalization, patient to be seen 3 x/week to address the identified rehab impairments via gait training, therapeutic activities, therapeutic exercises and progress toward the following goals:    Plan of Care Expires:  08/15/23    Subjective     Chief Complaint: weakness and pain to L Knee  Patient/Family Comments/goals: Pt agreed to participate.  Pain/Comfort:  Pain Rating 1:  (pt did not rate)  Location - Side 1: Left  Location 1: knee  Pain Addressed 1: Pre-medicate for activity, Reposition, Distraction, Cessation of Activity  Pain Rating Post-Intervention 1:  (pt did not rate)      Objective:     Communicated with nurse Albarran prior to session.  Patient found HOB elevated with telemetry, peripheral IV, PureWick, oxygen upon PT entry to room.     General Precautions: Standard, fall, respiratory,  diabetic  Orthopedic Precautions: N/A  Braces: N/A  Respiratory Status: Nasal cannula, flow 2 L/min     Functional Mobility:  Bed Mobility:     Rolling Left:  contact guard assistance using BR  Rolling Right: contact guard assistance using BR  Scooting: anterior and side scoot toward HOB with stand by assistance  Bridging: moderate assistance and of 2 persons; pt was able to pull with his BUE using head BR  Supine to Sit: minimum assistance for BLE management with HOB elevated  Sit to Supine: minimum assistance for BLE management  Transfers:  Gait belt donned prior Standing   Sit to Stand: 3 trials from EOB with maximal assistance and of 2 persons with rolling walker; attempted to have pt taking sidestep toward L side, however, pt was not able to move or pivot his R foot over  Balance: Fair+ Sitting      AM-PAC 6 CLICK MOBILITY  Turning over in bed (including adjusting bedclothes, sheets and blankets)?: 3  Sitting down on and standing up from a chair with arms (e.g., wheelchair, bedside commode, etc.): 2  Moving from lying on back to sitting on the side of the bed?: 3  Moving to and from a bed to a chair (including a wheelchair)?: 2  Need to walk in hospital room?: 1  Climbing 3-5 steps with a railing?: 1  Basic Mobility Total Score: 12       Treatment & Education:  Re-educated pt on importance of Bed Mobility, elevating BLE above heart level + performing AP throughout the day for edema and performing BLE ex throughout the day, pt verbalized understanding.     BLE ex in seated 2 sets x 15 reps AP, LAQ, Marching  (having pt counting number out loud) HR 71-76 bpm SpO2 90-94% on 2L/min Oxygen NC    Patient left HOB elevated with B pressure relief boots, tray table at bedside, L side offloaded by wedge, pillow between knee, all lines intact, call button in reach, bed alarm on, nurse notified.      GOALS:   Multidisciplinary Problems       Physical Therapy Goals          Problem: Physical Therapy    Goal Priority  Disciplines Outcome Goal Variances Interventions   Physical Therapy Goal     PT, PT/OT Ongoing, Progressing     Description: Goals to be met by: 23     Patient will increase functional independence with mobility by performin. Pt to be min A with bed mobility.  2. Pt to transfer with min A.  3. Pt to be able to tolerate standing with RW EOB; Pt to ambulate 15' w/RW min A.  4. Pt to be (I) with written HEP.                         Time Tracking:     PT Received On: 23  PT Start Time:      PT Stop Time:   PT Total Time (min): 40 min     Billable Minutes: Therapeutic Activity 23 min and Therapeutic Exercise 17 min (Co-treated with OT)    Co- treatment performed due to patient's multiple deficits requiring two skilled therapists to appropriately and safely assess patient's strength and endurance while facilitating functional tasks in addition to accommodating for patient's activity tolerance      Treatment Type: Treatment  PT/PTA: PTA     Number of PTA visits since last PT visit: 4     2023

## 2023-08-14 NOTE — NURSING
[facility], [facility]  Nurses Note -- 4 Eyes      8/14/2023       Skin assessed on: Q Shift      [x] No Pressure Injuries Present    []Prevention Measures Documented    [] Yes LDA  for Pressure Injury Previously documented     [] Yes New Pressure Injury Discovered   [] LDA for New Pressure Injury Added      Attending RN:  Avis Hernandez RN     Second RN:  Ethel Hernandez LPN

## 2023-08-14 NOTE — PLAN OF CARE
Patient alert and able to make needs known. No acute distress noted, patient free from falls or injury this shift.  Bed in low position, wheels locked, call light in reach for assistance, plan of care continued.       Problem: Impaired Wound Healing  Goal: Optimal Wound Healing  Outcome: Ongoing, Progressing  Intervention: Promote Wound Healing  Flowsheets (Taken 8/14/2023 0651)  Sleep/Rest Enhancement: awakenings minimized  Activity Management:   Leg kicks - L2   Rolling - L1     Problem: Fall Injury Risk  Goal: Absence of Fall and Fall-Related Injury  Outcome: Ongoing, Progressing  Intervention: Identify and Manage Contributors  Flowsheets (Taken 8/14/2023 0651)  Medication Review/Management: medications reviewed

## 2023-08-14 NOTE — PROGRESS NOTES
[facility] Medicine  Telemedicine Progress Note    Patient Name: Chato Bowie  MRN: [id number]  Patient Class: IP- Inpatient   Admission Date: 7/27/2023  Length of Stay: 15 days  Attending Physician: Nhung Martínez MD  Primary Care Provider: Irlanda Valenzuela -          Subjective:     Principal Problem:Chronic diastolic heart failure        HPI:  Chato Bowie 72 y.o. male with CHF, CAD, DM, HTN, HLD, presents to the hospital with a chief complaint of BLE swelling and pain.  onset several weeks ago per chart review, patient was recently discharged from the ED yesterday for similar complaints, and was discharged from inpatient status on 07/25/2023. the patient had endorsed no relief to his complaints since discharge. Patient currently reports complaints of BLE swelling with associated pain. He states his complaints have been ongoing for a few weeks, but had worsened as of recent. He additionally reports he last had similar complaints a month ago, noting he had to be admitted for a couple of nights. He endorses compliance with antihypertensives and diuretics. He reports following with his PCP at [facility], but reports he does not know who is his cardiologist. Denies fever, shortness of breath, or cough.  Patient's niece states that the patient does not live in assisted living however he does need it as she can not provide care for him especially due to him being COVID positive.  No other alleviating or exacerbating factors noted.    In the ED patient was hypertensive (181/140) afebrile without leukocytosis, mild normocytic anemia noted, BUN 46, creatinine 2.7, EGFR 24, glucose 136, albumin 2.3, , TSH WNL, COVID positive, flu negative, UA showed +1 protein, +2 occult blood, CXR was negative for acute process.  Patient was placed in observation      Overview/Hospital Course:  Admission to hospital for BLE edema up hips due to acute on chronic diastolic heart failure and malnutrition.  Patient also found positive COVID 19 -asymptomatic on admission so Remdesivir discontinued. Edema improving with IV diuresis.  Became hypoxic night 7/29 requiring 2 L NC. Started Remdesivir/decadron (7/30), repeat CXR. Hypoglycemia (7/29) improved with oral intake and holding insulin.   He also have left  knee pain and edema similar to previous admission in June where Orthopedic surgery suspected gouty arthritis and attempted aspiration but only able to give steroid injection with improvement. No colchicine due to CKD. Left knee x ray on 7/17/23 showed significant suprapatellar effusion. Due to pain and decrease mobility, will ask Orthopedic Surgery to evaluate.  Hold apixaban  (last dose 7/29 2221).   Hgb treaded down 8.2>7.5>7.1>6.5 without over signs of bleeding. Had normal BM on 7/29 no melena or hematochezia. Previous iron studies low iron/t stat and elevated ferritin, normal vitamin b 12, and low folic acid. Start folic acid, ferrous sulfate and transfuse 1 unit of PRBC.   Recent admission 7/17-7/25 for sepsis due to shingles/cellulitis and ELIZABETH. Right buttock lesion from shingles are all in different part of healing phase without drainage. Also have gluteal cleft excoriation. Continue wound care as recommended by wound care team on previous admission-Cleanse both area with vashe, put mepilex to gluteal cleft, and put hydrogel to shingle lesion.   Also have right upper lateral back below axillary unroof blister and right foot ventral aspect skin tear. Keep clean and dry.     PT/OT evaluation completed. TN/SW to assist with SNF.               Interval History: Pt noted to be afebrile and hemodynamically stable. Stable respiratory status. On 3 L O2 via NC. No acute events overnight. No new complaints this morning. Doing well overall. Last BM was last night. Order boost supplements.       Review of Systems   Constitutional:  Positive for activity change and fatigue. Negative for fever.   Respiratory:  Negative for  "cough and shortness of breath.    Cardiovascular:  Negative for chest pain.   Gastrointestinal:  Negative for abdominal pain.   Musculoskeletal:  Positive for arthralgias, back pain and myalgias.   Neurological:  Negative for dizziness and headaches.   Psychiatric/Behavioral:  Negative for confusion.    All other systems reviewed and are negative.    Objective:     Vital Signs (Most Recent):  Temp: 98.6 °F (37 °C) (08/14/23 1123)  Pulse: 67 (08/14/23 1123)  Resp: 20 (08/14/23 1123)  BP: (!) 149/69 (08/14/23 1123)  SpO2: 95 % (08/14/23 1123) Vital Signs (24h Range):  Temp:  [97.9 °F (36.6 °C)-100.1 °F (37.8 °C)] 98.6 °F (37 °C)  Pulse:  [64-68] 67  Resp:  [18-24] 20  SpO2:  [93 %-100 %] 95 %  BP: (139-160)/(64-69) 149/69     Weight: 102.5 kg (226 lb)  Body mass index is 31.52 kg/m².    Intake/Output Summary (Last 24 hours) at 8/14/2023 1220  Last data filed at 8/14/2023 0909  Gross per 24 hour   Intake 240 ml   Output 650 ml   Net -410 ml           Physical Exam  Vitals and nursing note reviewed.   Constitutional:       Appearance: Normal appearance.   HENT:      Head: Normocephalic and atraumatic.   Eyes:      Extraocular Movements: Extraocular movements intact.      Pupils: Pupils are equal, round, and reactive to light.   Cardiovascular:      Rate and Rhythm: Normal rate and regular rhythm.   Pulmonary:      Effort: Pulmonary effort is normal. No respiratory distress.   Abdominal:      General: Abdomen is flat. There is no distension.   Musculoskeletal:         General: Normal range of motion.      Cervical back: Normal range of motion.      Comments: Globally weak   Neurological:      General: No focal deficit present.      Mental Status: He is alert and oriented to person, place, and time.   Psychiatric:         Mood and Affect: Mood normal.         Behavior: Behavior normal.             Significant Labs: All pertinent labs within the past 24 hours have been reviewed.  CBC:   No results for input(s): "WBC", " ""HGB", "HCT", "PLT" in the last 48 hours.    CMP:   Recent Labs   Lab 08/13/23  0431   *   K 3.9      CO2 25   GLU 81   BUN 91*   CREATININE 2.8*   CALCIUM 8.7   ANIONGAP 10         Significant Imaging: I have reviewed all pertinent imaging results/findings within the past 24 hours.      Assessment/Plan:      * acute on chronic diastolic heart failure  -Admitted to inpatient status  -On admit had severe edema / anasarca - BLE up to hips and abdomen with edema inhibiting ambulation.  -Noted recent 2 D echo in June HFpEF.   -Strict ins/outs and daily weights  -Slowly improving and for first time seeing some wrinkling of skin on legs, but still with significant deep edema in legs. Still no sign of contraction alkalosis, but Cr has bumped up a bit.  -Consult wound care to evaluate for compression wrappings for his legs  -Switched to po lasix 80 mg bid   -Planning NH placement at discharge.    Discharge planning issues    -Patient noted to be medically stable for discharge at this time  -Patient pending placement, continue in-hospital care as stated above in the meantime  -More than 20 minutes of my time has been spent discussing and planning just her safe disposition with patient/family/CM/SW/Nursing staff (not including other time spent in clinical discussion and management)  -CM/SW following, appreciate input   -Will place DC orders once placement has been secured      Effusion of left knee  -X-ray on 07/17 showed significant suprapatellar effusion.  Previous admission in June orthopedic surgery attempted aspiration but no fluid then injected steroid with improvement in pain  -Uric acid remains elevated at 10.5, but notably improved from prior measurements this year  -No colchicine secondary to CKD  -Consulted orthopedic surgery and input appreciated - no intervention at this time  -Today no complaints of pain.  -Added allopurinol 8/3  -Adding lidoderm patches  -continues to complain of knee pain. Continue " judicious pain control     Multiple wounds  1. Gluteal cleft - excoriation- Wound care consult from previous admission few days ago-   2. Right buttock lesion from shingles healing at different stages - no vesicles  3. Right foot ventral aspect skin tear- clean with vashe and keep clean JASON  4. Right lateral back below armput few less then eraser size blister. - keep clean dry  -Wound care consulted and input appreciated  -Cleanse with vashe and put hydrogel to right buttock lesions from shingles     COVID-19 virus infection  -Initially asymptomatic now with non productive cough, 7/29 night 89% improve with O2 2 LNC  -CXR showed increase opacities and small pleural effusions  -Treated with dexamethasone, remdsivir, prn tessalon, albuterol inhaler bid and incentive spirometry  -Stopped dexamethasone due to hyperglycemia and he has completed remdesivir  -Clinically his covid infection has resolved and he is breathing comfortably on room air and symptom free  -covid isolation d/c as outside of infectious window  -stable respiratory status     Debility Due to left knee pain and effusion due to gout? and/or injury  -Prior to admission patient used a roller walker for ambulation at baseline.  -On admit patient unable to ambulate even with walker due to swelling of his legs and left knee pain  -Xray of knee showed no acute findings  -orthopedic surgery consulted and input appreciated.  Ortho note 7/30 reviewed - report he does not ambulate and pain is chronic - he uses wheelchair and bed.  Noted L knee effusion felt secondary to arthritis and no evidence of infection or need for aspiration.  -PT/OT consulted and input appreciated.  Recommended SNF which we attempted to get approved, but after peer to peer today that was denied by his insurance company - [employer].    -Discussed with patient 8/6 - states he prefers to go home with HH but not confident it is safe for him.  States he will think about it and discuss with his  niece.  -8/7 patient states he would be unable to care for himself at home and is agreeable to NH placement.  -medically stbale for dc, pending placement     Severe malnutrition  -Added protein supplementations with meals    Anasarca  -Due to HFpEF, CKD, malnutrition, COVID 19 infection and immobility- addressing these issues separately.     CKD (chronic kidney disease), stage IV  -Baseline Cr 2.7-3.0.  -Cr now stable around baseline  -Remains fluid overloaded with edema, but is improving. Continue leg wraps and oral diuretics   -Avoid nephrotoxic agents and renally dose meds  -Diurese as above  -Repeat BMP in AM    Paroxysmal atrial flutter  -Stable  -Continue amiodarone, metoprolol and eliquis    BPH (benign prostatic hyperplasia)  -Chronic, stable, continue Flomax    Alcohol abuse  -Patient has a history of alcohol abuse however denies any recent used in the past week.    -No signs of DT this admission     Anemia  -Hgb drop this admission and compared to last month hgb 10-11 range.   -No melena or hematochezia noted.  -3/2016 EGD normal esophagus and erythematous stomach and duodenum but no mention ulcers/erosions  -CKD IV also contributing   -Low iron and t stat elevated ferritin, normal vitamin b 12, low folic acid  -Held eliquis initially but now resumed.  -Received 1 unit PRBC and added PPI.  -Started folic acid, ferrous sulfate  -Hb stable    Type 2 diabetes mellitus with kidney complication, with long-term current use of insulin  -A1c 7.8  -Noted significant hyperglycemia earlier in stay   -Improved but with persistent morning hypoglycemia down to 70s on morning of 8/4, 54 on 8/5 and 62 today  -Stop scheduled aspart.  Change detemir to 7 units bid for now.  Continue SSI ac/hs    Dyslipidemia  -Chronic, stable, substitute for atorvastatin while in the hospital    Essential hypertension  -BP well controlled  -Continue home antihypertensives-amlodipine, hydralazine, metoprolol with parameters  -Prn hydralazine  SBP >180    Fever  Pt with 101.2 fever overnight of 8/9.. CXR at the time with subtle findings, but no obvious infiltrate. No sxs currently. No leukocytosis, negative urine, procal negative. No repeat fevers. Does not appear to be infectious in origin      VTE Risk Mitigation (From admission, onward)         Ordered     apixaban tablet 5 mg  2 times daily         08/01/23 1648     Place MASOOD hose  Until discontinued         07/28/23 0719     IP VTE HIGH RISK PATIENT  Once         07/27/23 2326     Place sequential compression device  Until discontinued         07/27/23 2326     Reason for No Pharmacological VTE Prophylaxis  Once        Question:  Reasons:  Answer:  Already adequately anticoagulated on oral Anticoagulants    07/27/23 2326                      I have completed this tele-visit without the assistance of a telepresenter.    The attending portion of this evaluation, treatment, and documentation was performed per Nhung Martínez MD via Telemedicine AudioVisual using the secure [employer] software platform with 2 way audio/video. The provider was located off-site and the patient is located in the hospital. The aforementioned video software was utilized to document the relevant history and physical exam    Nhung Martínez MD  Department of Hospital Medicine   [facility]

## 2023-08-14 NOTE — PT/OT/SLP PROGRESS
Occupational Therapy   Treatment    Name: Chato Bowie  MRN: [id number]  Admitting Diagnosis:  Chronic diastolic heart failure       Recommendations:     Discharge Recommendations: nursing facility, skilled, nursing facility, basic  Discharge Equipment Recommendations:  none  Barriers to discharge:  Other (Comment)    Assessment:     Chato Bowie is a 72 y.o. male with a medical diagnosis of Chronic diastolic heart failure.  He presents with HOB elevated on 2L of oxygen with 98% before sitting EOB. He was at 93% on 1L sitting EOB. Performance deficits affecting function are weakness, impaired endurance, impaired sensation, impaired self care skills, impaired functional mobility, gait instability, impaired balance, impaired cognition, decreased coordination, decreased upper extremity function, decreased lower extremity function, decreased safety awareness, pain, decreased ROM, impaired coordination, impaired cardiopulmonary response to activity.     Rehab Prognosis:  Fair; patient would benefit from acute skilled OT services to address these deficits and reach maximum level of function.       Plan:     Patient to be seen 5 x/week to address the above listed problems via self-care/home management, therapeutic activities, therapeutic exercises  Plan of Care Expires: 08/28/23 (extend POC due to awaiting NH placement)  Plan of Care Reviewed with: patient    Subjective     Chief Complaint: left knee pain   Patient/Family Comments/goals: NH placement   Pain/Comfort:  Pain Rating 1: 7/10  Location - Side 1: Left  Location 1: knee  Pain Addressed 1: Pre-medicate for activity, Nurse notified    Objective:     Communicated with: Avis COOK, prior to session.  Patient found HOB elevated with telemetry, oxygen, PureWick, peripheral IV upon OT entry to room.    General Precautions: Standard, fall, respiratory, diabetic    Orthopedic Precautions:N/A  Braces: N/A  Respiratory Status: Nasal cannula, flow 2  L/min (see above  assessment)      Occupational Performance:     Bed Mobility:    Patient completed Rolling/Turning to Left with  minimum assistance  Patient completed Rolling/Turning to Right with moderate assistance  Patient completed Scooting/Bridging with maximal assistance x 2   Patient completed Supine to Sit with moderate assist   Patient completed Sit to supine with max assist     Functional Mobility/Transfers:  Patient completed Sit <> Stand Transfer with maximal assistance and of 2  persons  with  rolling walker   Functional Mobility: Patient took 2-3 side steps to left side with multiple vc and tactile cues to move RW toward HOB from mid bed.     Activities of Daily Living:  Grooming: stand by assistance seated EOB   Upper Body Dressing: minimum assistance to don outer gown   Lower Body Dressing: total  assistance to don socks  Toileting: total assistance to doff/don brief, purewick, and hygiene following BM       AMPAC 6 Click ADL: 14    Treatment & Education:  Occupational therapy educated re: OOB mobility and self care seated to increase endurance and strength     Patient left left sidelying with all lines intact, call button in reach, bed alarm on, and RN  notified    GOALS:   Multidisciplinary Problems       Occupational Therapy Goals          Problem: Occupational Therapy    Goal Priority Disciplines Outcome Interventions   Occupational Therapy Goal     OT, PT/OT Ongoing, Progressing    Description: Goals to be met by: 8/18/23     Patient will increase functional independence with ADLs by performing:    UE Dressing with [place].  LE Dressing with Modified [place].  Grooming while seated at sink with [place].  Toileting from toilet with Modified [place] for hygiene and clothing management.   Bathing from  edge of bed with Minimal Assistance.  Toilet transfer to toilet with Modified [place].  Upper extremity exercise program x10-15 reps per handout, with supervision.      Patient seen by  occupational therapy for initial evaluation, so see notes for more info. Patient is having difficulty taking care of himself at home, so needs SNF care. Occupational therapy to see 5x/week.                        Time Tracking:     OT Date of Treatment: 08/14/23  OT Start Time: 1655  OT Stop Time: 1735  OT Total Time (min): 40 min    Billable Minutes:Self Care/Home Management 15   Therapeutic Activity 25     OT/JASON: OT        CO-treat with PTA     8/14/2023

## 2023-08-14 NOTE — PT/OT/SLP PROGRESS
Occupational Therapy      Patient Name:  Chato Bowie   MRN:  [id number]    Patient not seen today secondary to Patient fatigue, Other (Comment) (pt. lethargic and would not stay awake). Will follow-up later as able.    8/14/2023

## 2023-08-14 NOTE — NURSING
Received report and assumed care. Patient lying in bed resting, NAD noted. Safety precautions maintained.     [facility] [place]  Nurses Note -- 4 Eyes      8/13/2023       Skin assessed on: Q Shift      [] No Pressure Injuries Present    []Prevention Measures Documented    [x] Yes LDA  for Pressure Injury Previously documented     [] Yes New Pressure Injury Discovered   [] LDA for New Pressure Injury Added      Attending RN:  Ethel Hernandez LPN     Second RN:  Qing Garcia RN

## 2023-08-14 NOTE — PROGRESS NOTES
"  [facility] - Med Surg  Adult Nutrition  Consult Note    SUMMARY     Recommendations    Continue to monitor PO intake of meals and snacks  If intake remains below 50%, Consider ONS Boost to help meet EEN/EPN  Continue to monitor weights and labs  Collaboration with medical providers    Goals: 1. Pt to meet % EEN/EPN by RD follow up  Nutrition Goal Status: new  Communication of RD Recs:  (POC)    Assessment and Plan    Nutrition Problem  No nutrition dx at this time    Reason for Assessment    Reason For Assessment: consult  Diagnosis:  (CHF)  Relevant Medical History:  Past Medical History:   Diagnosis Date    Acute congestive heart failure 3/20/2020    Alcohol abuse     Arthritis     Asthma attack 11/24/2021    Coronary artery disease     Diabetes mellitus     Hyperlipemia     Hypertension     Multiple thyroid nodules 6/14/2023    Rhabdomyolysis    General Information Comments: RD consult 2/2 LOS > 8 days. Pt intake varies since admit, around 50% of meals since admit. bmi 31.52; overweight. Per chart review, there are no weight changes for years. Previous diagnosis of malnutrition in 2022. KNFA. LBM 8/11/23. NFPE not appropriate at this time.   Interdisciplinary Rounds: did not attend  Nutrition Discharge Planning: Cardiac Diet    Nutrition Risk Screen    Nutrition Risk Screen: no indicators present    Nutrition/Diet History    Spiritual, Cultural Beliefs, Christian Practices, Values that Affect Care: no  Food Allergies: NKFA    Anthropometrics    Temp: 98.6 °F (37 °C)  Height Method: Stated  Height: 5' 11" (180.3 cm)  Height (inches): 71 in  Weight Method: Bed Scale  Weight: 102.5 kg (226 lb)  Weight (lb): 226 lb  Ideal Body Weight (IBW), Male: 172 lb  % Ideal Body Weight, Male (lb): 131.4 %  BMI (Calculated): 31.5  BMI Grade: 30 - 34.9- obesity - grade I       Lab/Procedures/Meds    Pertinent Labs Reviewed: reviewed  BMP  Lab Results   Component Value Date     (L) 08/13/2023    K 3.9 08/13/2023    CL " 100 08/13/2023    CO2 25 08/13/2023    BUN 91 (H) 08/13/2023    CREATININE 2.8 (H) 08/13/2023    CALCIUM 8.7 08/13/2023    ANIONGAP 10 08/13/2023    EGFRNORACEVR 23 (A) 08/13/2023      Pertinent Medications Reviewed: reviewed  Current Outpatient Medications   Medication Instructions    albuterol (PROVENTIL/VENTOLIN HFA) 90 mcg/actuation inhaler 2 puffs, Inhalation, Every 6 hours PRN    amiodarone (PACERONE) 200 mg, Oral, Daily    amLODIPine (NORVASC) 10 mg, Oral, Daily    ELIQUIS 5 mg, Oral, 2 times daily    furosemide (LASIX) 40 mg, Oral, 2 times daily    gabapentin (NEURONTIN) 100 mg, Oral, 2 times daily    hydrALAZINE (APRESOLINE) 100 mg, Oral, Every 8 hours    HYDROcodone-acetaminophen (NORCO) 5-325 mg per tablet 1 tablet, Oral, Every 8 hours PRN    insulin aspart U-100 (NOVOLOG) 5 Units, Subcutaneous, 3 times daily    magnesium oxide (MAG-OX) 800 mg, Oral    metoprolol succinate (TOPROL-XL) 25 mg, Oral, Daily    NOVOLIN 70/30 U-100 INSULIN 100 unit/mL (70-30) injection 55 Units, Subcutaneous, 2 times daily    potassium chloride (KLOR-CON) 10 MEQ TbSR 10 mEq, Oral, Daily    rosuvastatin (CRESTOR) 40 mg, Oral, Nightly    sildenafiL (VIAGRA) 100 MG tablet TAKE 1 TABLET BY MOUTH ONCE DAILY AS NEEDED FOR ERECTILE DYSFUNCTION.    tamsulosin (FLOMAX) 0.4 mg, Oral, Daily        Estimated/Assessed Needs    Weight Used For Calorie Calculations: 78 kg (172 lb)  Energy Calorie Requirements (kcal): 2017 kcal  Energy Need Method: Meeker-St Jeor (1.3 x IBW)  Protein Requirements: 78 g (1.0 g/kg ibw)  Weight Used For Protein Calculations: 78 kg (172 lb)     Estimated Fluid Requirement Method: RDA Method  RDA Method (mL): 2017         Nutrition Prescription Ordered    Current Diet Order: cardiac diet    Evaluation of Received Nutrient/Fluid Intake    I/O: -6 L  Energy Calories Required: meeting needs  Protein Required: meeting needs  Fluid Required: meeting needs  Comments: LBM 8/11/23  % Intake of Estimated Energy Needs: 50 -  75 %  % Meal Intake: 50 - 75 %    Nutrition Risk    Level of Risk/Frequency of Follow-up: low - moderate       Monitor and Evaluation    Food and Nutrient Intake: food and beverage intake  Food and Nutrient Adminstration: diet order  Knowledge/Beliefs/Attitudes: food and nutrition knowledge/skill, beliefs and attitudes  Physical Activity and Function: nutrition-related ADLs and IADLs, factors affecting access to physical activity  Anthropometric Measurements: weight, weight change, body mass index  Biochemical Data, Medical Tests and Procedures: electrolyte and renal panel       Nutrition Follow-Up  Yes    Katja Pandey, Registration Eligible, Provisional LDN

## 2023-08-14 NOTE — PLAN OF CARE
Problem: Fall Injury Risk  Goal: Absence of Fall and Fall-Related Injury  Outcome: Ongoing, Progressing     Problem: Impaired Wound Healing  Goal: Optimal Wound Healing  Outcome: Ongoing, Progressing

## 2023-08-14 NOTE — SUBJECTIVE & OBJECTIVE
Interval History: Pt noted to be afebrile and hemodynamically stable. Stable respiratory status. On 3 L O2 via NC. No acute events overnight. No new complaints this morning. Doing well overall. Last BM was last night. Order boost supplements.       Review of Systems   Constitutional:  Positive for activity change and fatigue. Negative for fever.   Respiratory:  Negative for cough and shortness of breath.    Cardiovascular:  Negative for chest pain.   Gastrointestinal:  Negative for abdominal pain.   Musculoskeletal:  Positive for arthralgias, back pain and myalgias.   Neurological:  Negative for dizziness and headaches.   Psychiatric/Behavioral:  Negative for confusion.    All other systems reviewed and are negative.    Objective:     Vital Signs (Most Recent):  Temp: 98.6 °F (37 °C) (08/14/23 1123)  Pulse: 67 (08/14/23 1123)  Resp: 20 (08/14/23 1123)  BP: (!) 149/69 (08/14/23 1123)  SpO2: 95 % (08/14/23 1123) Vital Signs (24h Range):  Temp:  [97.9 °F (36.6 °C)-100.1 °F (37.8 °C)] 98.6 °F (37 °C)  Pulse:  [64-68] 67  Resp:  [18-24] 20  SpO2:  [93 %-100 %] 95 %  BP: (139-160)/(64-69) 149/69     Weight: 102.5 kg (226 lb)  Body mass index is 31.52 kg/m².    Intake/Output Summary (Last 24 hours) at 8/14/2023 1220  Last data filed at 8/14/2023 0909  Gross per 24 hour   Intake 240 ml   Output 650 ml   Net -410 ml           Physical Exam  Vitals and nursing note reviewed.   Constitutional:       Appearance: Normal appearance.   HENT:      Head: Normocephalic and atraumatic.   Eyes:      Extraocular Movements: Extraocular movements intact.      Pupils: Pupils are equal, round, and reactive to light.   Cardiovascular:      Rate and Rhythm: Normal rate and regular rhythm.   Pulmonary:      Effort: Pulmonary effort is normal. No respiratory distress.   Abdominal:      General: Abdomen is flat. There is no distension.   Musculoskeletal:         General: Normal range of motion.      Cervical back: Normal range of motion.       "Comments: Globally weak   Neurological:      General: No focal deficit present.      Mental Status: He is alert and oriented to person, place, and time.   Psychiatric:         Mood and Affect: Mood normal.         Behavior: Behavior normal.             Significant Labs: All pertinent labs within the past 24 hours have been reviewed.  CBC:   No results for input(s): "WBC", "HGB", "HCT", "PLT" in the last 48 hours.    CMP:   Recent Labs   Lab 08/13/23  0431   *   K 3.9      CO2 25   GLU 81   BUN 91*   CREATININE 2.8*   CALCIUM 8.7   ANIONGAP 10         Significant Imaging: I have reviewed all pertinent imaging results/findings within the past 24 hours.  "

## 2023-08-15 LAB
ANION GAP SERPL CALC-SCNC: 16 MMOL/L (ref 8–16)
BASOPHILS # BLD AUTO: 0.03 K/UL (ref 0–0.2)
BASOPHILS NFR BLD: 0.3 % (ref 0–1.9)
BUN SERPL-MCNC: 80 MG/DL (ref 8–23)
CALCIUM SERPL-MCNC: 8.6 MG/DL (ref 8.7–10.5)
CHLORIDE SERPL-SCNC: 94 MMOL/L (ref 95–110)
CO2 SERPL-SCNC: 24 MMOL/L (ref 23–29)
CREAT SERPL-MCNC: 2.3 MG/DL (ref 0.5–1.4)
DIFFERENTIAL METHOD: ABNORMAL
EOSINOPHIL # BLD AUTO: 0.2 K/UL (ref 0–0.5)
EOSINOPHIL NFR BLD: 1.9 % (ref 0–8)
ERYTHROCYTE [DISTWIDTH] IN BLOOD BY AUTOMATED COUNT: 16 % (ref 11.5–14.5)
EST. GFR  (NO RACE VARIABLE): 29 ML/MIN/1.73 M^2
GLUCOSE SERPL-MCNC: 155 MG/DL (ref 70–110)
HCT VFR BLD AUTO: 22.7 % (ref 40–54)
HGB BLD-MCNC: 7.4 G/DL (ref 14–18)
IMM GRANULOCYTES # BLD AUTO: 0.05 K/UL (ref 0–0.04)
IMM GRANULOCYTES NFR BLD AUTO: 0.6 % (ref 0–0.5)
LYMPHOCYTES # BLD AUTO: 0.8 K/UL (ref 1–4.8)
LYMPHOCYTES NFR BLD: 8.4 % (ref 18–48)
MAGNESIUM SERPL-MCNC: 2.1 MG/DL (ref 1.6–2.6)
MCH RBC QN AUTO: 27 PG (ref 27–31)
MCHC RBC AUTO-ENTMCNC: 32.6 G/DL (ref 32–36)
MCV RBC AUTO: 83 FL (ref 82–98)
MONOCYTES # BLD AUTO: 1.1 K/UL (ref 0.3–1)
MONOCYTES NFR BLD: 11.8 % (ref 4–15)
NEUTROPHILS # BLD AUTO: 6.8 K/UL (ref 1.8–7.7)
NEUTROPHILS NFR BLD: 77 % (ref 38–73)
NRBC BLD-RTO: 0 /100 WBC
PHOSPHATE SERPL-MCNC: 3 MG/DL (ref 2.7–4.5)
PLATELET # BLD AUTO: 264 K/UL (ref 150–450)
PMV BLD AUTO: 8.6 FL (ref 9.2–12.9)
POCT GLUCOSE: 125 MG/DL (ref 70–110)
POCT GLUCOSE: 128 MG/DL (ref 70–110)
POCT GLUCOSE: 141 MG/DL (ref 70–110)
POCT GLUCOSE: 221 MG/DL (ref 70–110)
POTASSIUM SERPL-SCNC: 3.2 MMOL/L (ref 3.5–5.1)
RBC # BLD AUTO: 2.74 M/UL (ref 4.6–6.2)
SODIUM SERPL-SCNC: 134 MMOL/L (ref 136–145)
WBC # BLD AUTO: 8.89 K/UL (ref 3.9–12.7)

## 2023-08-15 PROCEDURE — 84100 ASSAY OF PHOSPHORUS: CPT | Performed by: HOSPITALIST

## 2023-08-15 PROCEDURE — 36415 COLL VENOUS BLD VENIPUNCTURE: CPT | Performed by: HOSPITALIST

## 2023-08-15 PROCEDURE — 11000001 HC ACUTE MED/SURG PRIVATE ROOM

## 2023-08-15 PROCEDURE — 25000003 PHARM REV CODE 250

## 2023-08-15 PROCEDURE — 80048 BASIC METABOLIC PNL TOTAL CA: CPT | Performed by: HOSPITALIST

## 2023-08-15 PROCEDURE — 83735 ASSAY OF MAGNESIUM: CPT | Performed by: HOSPITALIST

## 2023-08-15 PROCEDURE — 25000003 PHARM REV CODE 250: Performed by: NURSE PRACTITIONER

## 2023-08-15 PROCEDURE — 25000003 PHARM REV CODE 250: Performed by: STUDENT IN AN ORGANIZED HEALTH CARE EDUCATION/TRAINING PROGRAM

## 2023-08-15 PROCEDURE — 94760 N-INVAS EAR/PLS OXIMETRY 1: CPT

## 2023-08-15 PROCEDURE — 85025 COMPLETE CBC W/AUTO DIFF WBC: CPT | Performed by: HOSPITALIST

## 2023-08-15 PROCEDURE — 25000003 PHARM REV CODE 250: Performed by: HOSPITALIST

## 2023-08-15 RX ORDER — FUROSEMIDE 80 MG/1
80 TABLET ORAL 2 TIMES DAILY
Qty: 60 TABLET | Refills: 11 | Status: ON HOLD | OUTPATIENT
Start: 2023-08-15 | End: 2024-01-19

## 2023-08-15 RX ORDER — FOLIC ACID 1 MG/1
1 TABLET ORAL DAILY
Qty: 30 TABLET | Refills: 0 | Status: ON HOLD | OUTPATIENT
Start: 2023-08-16 | End: 2023-09-27 | Stop reason: HOSPADM

## 2023-08-15 RX ORDER — BENZONATATE 100 MG/1
100 CAPSULE ORAL 3 TIMES DAILY PRN
Start: 2023-08-15 | End: 2023-08-25

## 2023-08-15 RX ORDER — LIDOCAINE 50 MG/G
2 PATCH TOPICAL DAILY
Qty: 30 PATCH | Refills: 0 | Status: ON HOLD | OUTPATIENT
Start: 2023-08-15 | End: 2023-09-27 | Stop reason: HOSPADM

## 2023-08-15 RX ORDER — POTASSIUM CHLORIDE 20 MEQ/1
40 TABLET, EXTENDED RELEASE ORAL 3 TIMES DAILY
Status: COMPLETED | OUTPATIENT
Start: 2023-08-15 | End: 2023-08-15

## 2023-08-15 RX ORDER — ALLOPURINOL 100 MG/1
100 TABLET ORAL DAILY
Status: ON HOLD
Start: 2023-08-16 | End: 2024-01-19 | Stop reason: HOSPADM

## 2023-08-15 RX ADMIN — LIDOCAINE 5% 2 PATCH: 700 PATCH TOPICAL at 05:08

## 2023-08-15 RX ADMIN — BENZONATATE 100 MG: 100 CAPSULE ORAL at 08:08

## 2023-08-15 RX ADMIN — INSULIN ASPART 2 UNITS: 100 INJECTION, SOLUTION INTRAVENOUS; SUBCUTANEOUS at 12:08

## 2023-08-15 RX ADMIN — TAMSULOSIN HYDROCHLORIDE 0.4 MG: 0.4 CAPSULE ORAL at 08:08

## 2023-08-15 RX ADMIN — APIXABAN 5 MG: 5 TABLET, FILM COATED ORAL at 08:08

## 2023-08-15 RX ADMIN — POTASSIUM CHLORIDE 40 MEQ: 1500 TABLET, EXTENDED RELEASE ORAL at 01:08

## 2023-08-15 RX ADMIN — OXYCODONE HYDROCHLORIDE AND ACETAMINOPHEN 500 MG: 500 TABLET ORAL at 08:08

## 2023-08-15 RX ADMIN — AMIODARONE HYDROCHLORIDE 200 MG: 200 TABLET ORAL at 08:08

## 2023-08-15 RX ADMIN — FUROSEMIDE 80 MG: 40 TABLET ORAL at 08:08

## 2023-08-15 RX ADMIN — INSULIN DETEMIR 7 UNITS: 100 INJECTION, SOLUTION SUBCUTANEOUS at 08:08

## 2023-08-15 RX ADMIN — THERA TABS 1 TABLET: TAB at 08:08

## 2023-08-15 RX ADMIN — GABAPENTIN 100 MG: 100 CAPSULE ORAL at 08:08

## 2023-08-15 RX ADMIN — METOPROLOL SUCCINATE 25 MG: 25 TABLET, EXTENDED RELEASE ORAL at 08:08

## 2023-08-15 RX ADMIN — CAPSAICIN: 0.25 CREAM TOPICAL at 08:08

## 2023-08-15 RX ADMIN — FOLIC ACID 1 MG: 1 TABLET ORAL at 08:08

## 2023-08-15 RX ADMIN — ATORVASTATIN CALCIUM 80 MG: 40 TABLET, FILM COATED ORAL at 08:08

## 2023-08-15 RX ADMIN — CAPSAICIN: 0.25 CREAM TOPICAL at 09:08

## 2023-08-15 RX ADMIN — POTASSIUM CHLORIDE 40 MEQ: 1500 TABLET, EXTENDED RELEASE ORAL at 08:08

## 2023-08-15 RX ADMIN — AMLODIPINE BESYLATE 10 MG: 5 TABLET ORAL at 08:08

## 2023-08-15 RX ADMIN — ALLOPURINOL 100 MG: 100 TABLET ORAL at 08:08

## 2023-08-15 RX ADMIN — FUROSEMIDE 80 MG: 40 TABLET ORAL at 04:08

## 2023-08-15 NOTE — PT/OT/SLP PROGRESS
Occupational Therapy      Patient Name:  Chato Bowie   MRN:  [id number]    Patient not seen today secondary to patient signing nursing home papers with . Will follow-up tomorrow.    8/15/2023

## 2023-08-15 NOTE — ASSESSMENT & PLAN NOTE
-on 2L O2 via NC  -Continuous pulse oximetry; titrate oxygen to keep sats above 90%  -duonebs QID  -IS and flutter valve

## 2023-08-15 NOTE — PLAN OF CARE
No acute distress noted, patient free from falls or injury this shift.  Bed in low position, wheels locked, call light in reach for assistance, plan of care continued.       Problem: Fall Injury Risk  Goal: Absence of Fall and Fall-Related Injury  Outcome: Ongoing, Progressing  Intervention: Identify and Manage Contributors  Flowsheets (Taken 8/15/2023 0643)  Self-Care Promotion: independence encouraged  Medication Review/Management: medications reviewed  Intervention: Promote Injury-Free Environment  Flowsheets (Taken 8/15/2023 0643)  Safety Promotion/Fall Prevention:   assistive device/personal item within reach   /camera at bedside   room near unit station   bed alarm set   medications reviewed

## 2023-08-15 NOTE — PT/OT/SLP PROGRESS
Physical Therapy      Patient Name:  Chato Bowie   MRN:  [id number]    Patient not seen today secondary to Other (doing/signing paperwork with SW). Will follow-up.

## 2023-08-15 NOTE — NURSING
Received report care assumed. Patient lying in bed resting, NAD noted. Safety precautions maintained.     [facility] [place] 4 [place]      8/14/2023       Skin assessed on: Q Shift      [] No Pressure Injuries Present    []Prevention Measures Documented    [x] Yes LDA  for Pressure Injury Previously documented     [] Yes New Pressure Injury Discovered   [] LDA for New Pressure Injury Added      Attending RN:  Ethel Hernandez LPN     Second RN:  Avis Hernandez RN

## 2023-08-15 NOTE — NURSING
[facility] [place]  Nurses Note -- 4 Eyes      8/15/2023       Skin assessed on: Q Shift      [] No Pressure Injuries Present    []Prevention Measures Documented    [x] Yes LDA  for Pressure Injury Previously documented     [] Yes New Pressure Injury Discovered   [] LDA for New Pressure Injury Added      Attending RN:  Roberto Allen RN     Second RN:  Ethel MARTINEZ LPN

## 2023-08-15 NOTE — PLAN OF CARE
[facility]     Department of Hospital Medicine     [address] [place] [address]     [phone] after hours  [phone] fax       NURSING HOME ORDERS    08/15/2023    Admit to Nursing Home:  Regular Bed         Diagnoses:  Active Hospital Problems    Diagnosis  POA    *acute on chronic diastolic heart failure [I50.32]  Yes    Discharge planning issues [Z02.9]  Not Applicable    Effusion of left knee [M25.462]  Yes    Multiple wounds [T07.XXXA]  Yes     Chronic    COVID-19 virus infection [U07.1]  Yes    Debility Due to left knee pain and effusion due to gout? and/or injury [R53.81]  Yes    Severe malnutrition [E43]  Yes    Acute hypoxemic respiratory failure [J96.01]  Yes    Anasarca [R60.1]  Yes    CKD (chronic kidney disease), stage IV [N18.4]  Yes    Paroxysmal atrial flutter [I48.92]  Yes     Chronic    BPH (benign prostatic hyperplasia) [N40.0]  Yes    Alcohol abuse [F10.10]  Yes    Hypokalemia [E87.6]  Yes    Essential hypertension [I10]  Yes    Dyslipidemia [E78.5]  Yes    Type 2 diabetes mellitus with kidney complication, with long-term current use of insulin [E11.29, Z79.4]  Not Applicable     Chronic    Anemia [D64.9]  Yes    Fever [R50.9]  Yes      Resolved Hospital Problems    Diagnosis Date Resolved POA    DM (diabetes mellitus) type II controlled with renal manifestation [E11.29] 08/03/2023 Yes       Patient is homebound due to:  Chronic diastolic heart failure    Allergies:Review of patient's allergies indicates:  No Known Allergies    Vitals:       Every shift (Skilled Nursing patients)    Diet: diabetic/cardiac. Fluid restrict 1500cc   Supplement:  1 can every three times a day with meals                         Type:     Glucerna       Acitivities:      - Up in a chair each morning as tolerated   - Ambulate with assistance to bathroom   - Scheduled walks once each shift (every 8 hours)    LABS:  Per facility protocol    Nursing Precautions:      - Aspiration precautions:             - Total assistance with meals            -  Upright 90 degrees befor during and after meals             -  Suction at bedside          - Fall precautions per nursing home protocol   - Seizure precaution per care home protocol   - Decubitus precautions:        -  for positioning   - Pressure reducing foam mattress   - Turn patient every two hours. Use wedge pillows to anchor patient    CONSULTS:     Physical Therapy to evaluate and treat     Occupational Therapy to evaluate and treat     Speech Therapy  to evaluate and treat     Nutrition to evaluate and recommend diet         MISCELLANEOUS CARE:       Routine Skin for Bedridden Patients:  Apply moisture barrier cream to all    skin folds and wet areas in perineal area daily and after baths and                           all bowel movements.    Wound care: Local wound care to right dorsal foot wound daily- Cleanse with Vashe. Apply Vashe moistened gauze to ulcer and cover with Mepilex 4x4 border dressing.  1. Gluteal cleft - excoriation- Wound care consult from previous admission few days ago->>cleanse with vashe and dress with mepilex and change daily and prn   2. Right buttock lesion from shingles - no vesicles>>Cleanse with vashe and put hydrogel to right buttock   External jackson catheter to divert urine from wounds  Right lateral back underneath armpit blisters- keep clean and dry  Right foot ventral aspect skin tear- keep clean and dry    DIABETES CARE:      Check blood sugar:       Fingerstick blood sugar AC and HS   Fingerstick blood sugar every 6 hours if unable to eat      Report CBG < 60 or > 400 to physician.                                          Insulin Sliding Scale          Glucose  Novolog Insulin Subcutaneous        0 - 60   Orange juice or glucose tablet, hold insulin      No insulin   201-250  2 units   251-300  4 units   301-350  6 units   351-400  8 units   >400   10 units then call  physician      Medications: Discontinue all previous medication orders, if any. See new list below.       Medication List        START taking these medications      allopurinoL 100 MG tablet  Commonly known as: ZYLOPRIM  Take 1 tablet (100 mg total) by mouth once daily.  Start taking on: August 16, 2023     benzonatate 100 MG capsule  Commonly known as: TESSALON  Take 1 capsule (100 mg total) by mouth 3 (three) times daily as needed for Cough.     folic acid 1 MG tablet  Commonly known as: FOLVITE  Take 1 tablet (1 mg total) by mouth once daily.  Start taking on: August 16, 2023     HYDROcodone-acetaminophen 5-325 mg per tablet  Commonly known as: NORCO  Take 1 tablet by mouth every 8 (eight) hours as needed for Pain.  Replaces: HYDROcodone-acetaminophen  mg per tablet     insulin detemir U-100 (Levemir) 100 unit/mL (3 mL) Inpn pen  Inject 7 Units into the skin 2 (two) times daily.     LIDOcaine 5 %  Commonly known as: LIDODERM  Place 2 patches onto the skin once daily. Remove & Discard patch within 12 hours or as directed by MD            CHANGE how you take these medications      furosemide 80 MG tablet  Commonly known as: LASIX  Take 1 tablet (80 mg total) by mouth 2 (two) times daily.  What changed:   medication strength  how much to take            CONTINUE taking these medications      albuterol 90 mcg/actuation inhaler  Commonly known as: PROVENTIL/VENTOLIN HFA  Inhale 2 puffs into the lungs every 6 (six) hours as needed for Wheezing or Shortness of Breath.     amiodarone 200 MG Tab  Commonly known as: PACERONE     amLODIPine 10 MG tablet  Commonly known as: NORVASC  Take 1 tablet (10 mg total) by mouth once daily.     ELIQUIS 5 mg Tab  Generic drug: apixaban     gabapentin 100 MG capsule  Commonly known as: NEURONTIN  Take 1 capsule (100 mg total) by mouth 2 (two) times daily.     hydrALAZINE 100 MG tablet  Commonly known as: APRESOLINE  Take 1 tablet (100 mg total) by mouth every 8 (eight) hours.      insulin aspart U-100 100 unit/mL (3 mL) Inpn pen  Commonly known as: NovoLOG  Inject 5 Units into the skin 3 (three) times daily.     magnesium oxide 400 mg (241.3 mg magnesium) tablet  Commonly known as: MAG-OX     metoprolol succinate 25 MG 24 hr tablet  Commonly known as: TOPROL-XL     potassium chloride 10 MEQ Tbsr  Commonly known as: KLOR-CON     rosuvastatin 40 MG Tab  Commonly known as: CRESTOR     tamsulosin 0.4 mg Cap  Commonly known as: FLOMAX  Take 1 capsule (0.4 mg total) by mouth once daily.            STOP taking these medications      diclofenac sodium 1 % Gel  Commonly known as: VOLTAREN     HYDROcodone-acetaminophen  mg per tablet  Commonly known as: NORCO  Replaced by: HYDROcodone-acetaminophen 5-325 mg per tablet     metOLazone 5 MG tablet  Commonly known as: ZAROXOLYN     NovoLIN 70/30 U-100 Insulin 100 unit/mL (70-30) injection  Generic drug: insulin NPH-insulin regular (70/30)     sildenafiL 100 MG tablet  Commonly known as: VIAGRA               Where to Get Your Medications        These medications were sent to [id number] - [place], [place] - [address]  [address], [facility] [address]      Hours: 24-hours Phone: [phone]   folic acid 1 MG tablet  furosemide 80 MG tablet  LIDOcaine 5 %       Information about where to get these medications is not yet available    Ask your nurse or doctor about these medications  allopurinoL 100 MG tablet  benzonatate 100 MG capsule  HYDROcodone-acetaminophen 5-325 mg per tablet  insulin detemir U-100 (Levemir) 100 unit/mL (3 mL) Inpn pen                 _________________________________  Nhung Martínez MD  08/15/2023

## 2023-08-15 NOTE — PROGRESS NOTES
[facility]  Telemedicine Progress Note    Patient Name: Chato Bowie  MRN: [id number]  Patient Class: IP- Inpatient   Admission Date: 7/27/2023  Length of Stay: 16 days  Attending Physician: Nhung Martínez MD  Primary Care Provider: Irlanda Valenzuela -          Subjective:     Principal Problem:Chronic diastolic heart failure        HPI:  Chato Bowie 72 y.o. male with CHF, CAD, DM, HTN, HLD, presents to the hospital with a chief complaint of BLE swelling and pain.  onset several weeks ago per chart review, patient was recently discharged from the ED yesterday for similar complaints, and was discharged from inpatient status on 07/25/2023. the patient had endorsed no relief to his complaints since discharge. Patient currently reports complaints of BLE swelling with associated pain. He states his complaints have been ongoing for a few weeks, but had worsened as of recent. He additionally reports he last had similar complaints a month ago, noting he had to be admitted for a couple of nights. He endorses compliance with antihypertensives and diuretics. He reports following with his PCP at [facility], but reports he does not know who is his cardiologist. Denies fever, shortness of breath, or cough.  Patient's niece states that the patient does not live in assisted living however he does need it as she can not provide care for him especially due to him being COVID positive.  No other alleviating or exacerbating factors noted.    In the ED patient was hypertensive (181/140) afebrile without leukocytosis, mild normocytic anemia noted, BUN 46, creatinine 2.7, EGFR 24, glucose 136, albumin 2.3, , TSH WNL, COVID positive, flu negative, UA showed +1 protein, +2 occult blood, CXR was negative for acute process.  Patient was placed in observation      Overview/Hospital Course:  Admission to hospital for BLE edema up hips due to acute on chronic diastolic heart failure and malnutrition.  Patient also found positive COVID 19 -asymptomatic on admission so Remdesivir discontinued. Edema improving with IV diuresis.  Became hypoxic night 7/29 requiring 2 L NC. Started Remdesivir/decadron (7/30), repeat CXR. Hypoglycemia (7/29) improved with oral intake and holding insulin.   He also have left  knee pain and edema similar to previous admission in June where Orthopedic surgery suspected gouty arthritis and attempted aspiration but only able to give steroid injection with improvement. No colchicine due to CKD. Left knee x ray on 7/17/23 showed significant suprapatellar effusion. Due to pain and decrease mobility, will ask Orthopedic Surgery to evaluate.  Hold apixaban  (last dose 7/29 2221).   Hgb treaded down 8.2>7.5>7.1>6.5 without over signs of bleeding. Had normal BM on 7/29 no melena or hematochezia. Previous iron studies low iron/t stat and elevated ferritin, normal vitamin b 12, and low folic acid. Start folic acid, ferrous sulfate and transfuse 1 unit of PRBC.   Recent admission 7/17-7/25 for sepsis due to shingles/cellulitis and ELIZABETH. Right buttock lesion from shingles are all in different part of healing phase without drainage. Also have gluteal cleft excoriation. Continue wound care as recommended by wound care team on previous admission-Cleanse both area with vashe, put mepilex to gluteal cleft, and put hydrogel to shingle lesion.   Also have right upper lateral back below axillary unroof blister and right foot ventral aspect skin tear. Keep clean and dry.     PT/OT evaluation completed. TN/SW to assist with SNF.               Interval History: Pt noted to be afebrile and hemodynamically stable. On 2 L O2 via NC.  Will try to wean off. No acute events overnight. No new complaints this morning. Doing well overall. Last BM was last night.       Review of Systems   Constitutional:  Positive for activity change and fatigue. Negative for fever.   Respiratory:  Negative for cough and shortness of  breath.    Cardiovascular:  Negative for chest pain.   Gastrointestinal:  Negative for abdominal pain.   Musculoskeletal:  Positive for arthralgias, back pain and myalgias.   Neurological:  Negative for dizziness and headaches.   Psychiatric/Behavioral:  Negative for confusion.    All other systems reviewed and are negative.    Objective:     Vital Signs (Most Recent):  Temp: 97.8 °F (36.6 °C) (08/15/23 1114)  Pulse: 65 (08/15/23 1114)  Resp: 17 (08/15/23 1114)  BP: (!) 159/72 (08/15/23 1114)  SpO2: 96 % (08/15/23 1114) Vital Signs (24h Range):  Temp:  [97.8 °F (36.6 °C)-98.8 °F (37.1 °C)] 97.8 °F (36.6 °C)  Pulse:  [62-67] 65  Resp:  [17-20] 17  SpO2:  [92 %-100 %] 96 %  BP: (137-159)/(62-79) 159/72     Weight: 102.5 kg (226 lb)  Body mass index is 31.52 kg/m².    Intake/Output Summary (Last 24 hours) at 8/15/2023 1257  Last data filed at 8/15/2023 0817  Gross per 24 hour   Intake 290 ml   Output 700 ml   Net -410 ml           Physical Exam  Vitals and nursing note reviewed.   Constitutional:       Appearance: Normal appearance.   HENT:      Head: Normocephalic and atraumatic.   Eyes:      Extraocular Movements: Extraocular movements intact.      Pupils: Pupils are equal, round, and reactive to light.   Cardiovascular:      Rate and Rhythm: Normal rate and regular rhythm.   Pulmonary:      Effort: Pulmonary effort is normal. No respiratory distress.   Abdominal:      General: Abdomen is flat. There is no distension.   Musculoskeletal:         General: Normal range of motion.      Cervical back: Normal range of motion.      Comments: Globally weak   Neurological:      General: No focal deficit present.      Mental Status: He is alert and oriented to person, place, and time.   Psychiatric:         Mood and Affect: Mood normal.         Behavior: Behavior normal.             Significant Labs: All pertinent labs within the past 24 hours have been reviewed.  CBC:   Recent Labs   Lab 08/15/23  0320   WBC 8.89   HGB 7.4*    HCT 22.7*          CMP:   Recent Labs   Lab 08/15/23  0320   *   K 3.2*   CL 94*   CO2 24   *   BUN 80*   CREATININE 2.3*   CALCIUM 8.6*   ANIONGAP 16         Significant Imaging: I have reviewed all pertinent imaging results/findings within the past 24 hours.      Assessment/Plan:      * acute on chronic diastolic heart failure  -Admitted to inpatient status  -On admit had severe edema / anasarca - BLE up to hips and abdomen with edema inhibiting ambulation.  -Noted recent 2 D echo in June HFpEF.   -Strict ins/outs and daily weights  -Slowly improving and for first time seeing some wrinkling of skin on legs, but still with significant deep edema in legs. Still no sign of contraction alkalosis, but Cr has bumped up a bit.  -Consult wound care to evaluate for compression wrappings for his legs  -Switched to po lasix 80 mg bid   -Planning NH placement at discharge.    Discharge planning issues    -Patient noted to be medically stable for discharge at this time  -Patient pending placement, continue in-hospital care as stated above in the meantime  -More than 20 minutes of my time has been spent discussing and planning just her safe disposition with patient/family/CM/SW/Nursing staff (not including other time spent in clinical discussion and management)  -CM/SW following, appreciate input   -Will place DC orders once placement has been secured      Effusion of left knee  -X-ray on 07/17 showed significant suprapatellar effusion.  Previous admission in June orthopedic surgery attempted aspiration but no fluid then injected steroid with improvement in pain  -Uric acid remains elevated at 10.5, but notably improved from prior measurements this year  -No colchicine secondary to CKD  -Consulted orthopedic surgery and input appreciated - no intervention at this time  -Today no complaints of pain.  -Added allopurinol 8/3  -Adding lidoderm patches  -continues to complain of knee pain. Continue judicious  pain control     Multiple wounds  1. Gluteal cleft - excoriation- Wound care consult from previous admission few days ago-   2. Right buttock lesion from shingles healing at different stages - no vesicles  3. Right foot ventral aspect skin tear- clean with vashe and keep clean JASON  4. Right lateral back below armput few less then eraser size blister. - keep clean dry  -Wound care consulted and input appreciated  -Cleanse with vashe and put hydrogel to right buttock lesions from shingles     COVID-19 virus infection  -Initially asymptomatic now with non productive cough, 7/29 night 89% improve with O2 2 LNC  -CXR showed increase opacities and small pleural effusions  -Treated with dexamethasone, remdsivir, prn tessalon, albuterol inhaler bid and incentive spirometry  -Stopped dexamethasone due to hyperglycemia and he has completed remdesivir  -Clinically his covid infection has resolved and he is breathing comfortably on room air and symptom free  -covid isolation d/c as outside of infectious window  -stable respiratory status     Debility Due to left knee pain and effusion due to gout? and/or injury  -Prior to admission patient used a roller walker for ambulation at baseline.  -On admit patient unable to ambulate even with walker due to swelling of his legs and left knee pain  -Xray of knee showed no acute findings  -orthopedic surgery consulted and input appreciated.  Ortho note 7/30 reviewed - report he does not ambulate and pain is chronic - he uses wheelchair and bed.  Noted L knee effusion felt secondary to arthritis and no evidence of infection or need for aspiration.  -PT/OT consulted and input appreciated.  Recommended SNF which we attempted to get approved, but after peer to peer today that was denied by his insurance company - [employer].    -Discussed with patient 8/6 - states he prefers to go home with HH but not confident it is safe for him.  States he will think about it and discuss with his niece.  -8/7  patient states he would be unable to care for himself at home and is agreeable to NH placement.  -medically stbale for dc, pending placement     Severe malnutrition  -Added protein supplementations with meals    Acute hypoxemic respiratory failure  -on 2L O2 via NC  -Continuous pulse oximetry; titrate oxygen to keep sats above 90%  -duonebs QID  -IS and flutter valve        Anasarca  -Due to HFpEF, CKD, malnutrition, COVID 19 infection and immobility- addressing these issues separately.     CKD (chronic kidney disease), stage IV  -Baseline Cr 2.7-3.0.  -Cr now stable around baseline  -Remains fluid overloaded with edema, but is improving. Continue leg wraps and oral diuretics   -Avoid nephrotoxic agents and renally dose meds  -Diurese as above  -Repeat BMP in AM    Paroxysmal atrial flutter  -Stable  -Continue amiodarone, metoprolol and eliquis    BPH (benign prostatic hyperplasia)  -Chronic, stable, continue Flomax    Alcohol abuse  -Patient has a history of alcohol abuse however denies any recent used in the past week.    -No signs of DT this admission     Hypokalemia  -K of 3.2  -replete and monitor        Anemia  -Hgb drop this admission and compared to last month hgb 10-11 range.   -No melena or hematochezia noted.  -3/2016 EGD normal esophagus and erythematous stomach and duodenum but no mention ulcers/erosions  -CKD IV also contributing   -Low iron and t stat elevated ferritin, normal vitamin b 12, low folic acid  -Held eliquis initially but now resumed.  -Received 1 unit PRBC and added PPI.  -Started folic acid, ferrous sulfate  -Hb stable    Type 2 diabetes mellitus with kidney complication, with long-term current use of insulin  -A1c 7.8  -Noted significant hyperglycemia earlier in stay   -Improved but with persistent morning hypoglycemia down to 70s on morning of 8/4, 54 on 8/5 and 62 today  -Stop scheduled aspart.  Change detemir to 7 units bid for now.  Continue SSI ac/hs    Dyslipidemia  -Chronic,  stable, substitute for atorvastatin while in the hospital    Essential hypertension  -BP well controlled  -Continue home antihypertensives-amlodipine, hydralazine, metoprolol with parameters  -Prn hydralazine SBP >180    Fever  Pt with 101.2 fever overnight of 8/9.. CXR at the time with subtle findings, but no obvious infiltrate. No sxs currently. No leukocytosis, negative urine, procal negative. No repeat fevers. Does not appear to be infectious in origin      VTE Risk Mitigation (From admission, onward)         Ordered     apixaban tablet 5 mg  2 times daily         08/01/23 1648     Place MASOOD hose  Until discontinued         07/28/23 0719     IP VTE HIGH RISK PATIENT  Once         07/27/23 2326     Place sequential compression device  Until discontinued         07/27/23 2326     Reason for No Pharmacological VTE Prophylaxis  Once        Question:  Reasons:  Answer:  Already adequately anticoagulated on oral Anticoagulants    07/27/23 2326                      I have completed this tele-visit without the assistance of a telepresenter.    The attending portion of this evaluation, treatment, and documentation was performed per Nhung Martínez MD via Telemedicine AudioVisual using the secure [employer] software platform with 2 way audio/video. The provider was located off-site and the patient is located in the hospital. The aforementioned video software was utilized to document the relevant history and physical exam    Nhung Martínez MD  Department of Hospital Medicine   [facility]

## 2023-08-15 NOTE — SUBJECTIVE & OBJECTIVE
Interval History: Pt noted to be afebrile and hemodynamically stable. On 2 L O2 via NC.  Will try to wean off. No acute events overnight. No new complaints this morning. Doing well overall. Last BM was last night.       Review of Systems   Constitutional:  Positive for activity change and fatigue. Negative for fever.   Respiratory:  Negative for cough and shortness of breath.    Cardiovascular:  Negative for chest pain.   Gastrointestinal:  Negative for abdominal pain.   Musculoskeletal:  Positive for arthralgias, back pain and myalgias.   Neurological:  Negative for dizziness and headaches.   Psychiatric/Behavioral:  Negative for confusion.    All other systems reviewed and are negative.    Objective:     Vital Signs (Most Recent):  Temp: 97.8 °F (36.6 °C) (08/15/23 1114)  Pulse: 65 (08/15/23 1114)  Resp: 17 (08/15/23 1114)  BP: (!) 159/72 (08/15/23 1114)  SpO2: 96 % (08/15/23 1114) Vital Signs (24h Range):  Temp:  [97.8 °F (36.6 °C)-98.8 °F (37.1 °C)] 97.8 °F (36.6 °C)  Pulse:  [62-67] 65  Resp:  [17-20] 17  SpO2:  [92 %-100 %] 96 %  BP: (137-159)/(62-79) 159/72     Weight: 102.5 kg (226 lb)  Body mass index is 31.52 kg/m².    Intake/Output Summary (Last 24 hours) at 8/15/2023 1257  Last data filed at 8/15/2023 0817  Gross per 24 hour   Intake 290 ml   Output 700 ml   Net -410 ml           Physical Exam  Vitals and nursing note reviewed.   Constitutional:       Appearance: Normal appearance.   HENT:      Head: Normocephalic and atraumatic.   Eyes:      Extraocular Movements: Extraocular movements intact.      Pupils: Pupils are equal, round, and reactive to light.   Cardiovascular:      Rate and Rhythm: Normal rate and regular rhythm.   Pulmonary:      Effort: Pulmonary effort is normal. No respiratory distress.   Abdominal:      General: Abdomen is flat. There is no distension.   Musculoskeletal:         General: Normal range of motion.      Cervical back: Normal range of motion.      Comments: Globally weak    Neurological:      General: No focal deficit present.      Mental Status: He is alert and oriented to person, place, and time.   Psychiatric:         Mood and Affect: Mood normal.         Behavior: Behavior normal.             Significant Labs: All pertinent labs within the past 24 hours have been reviewed.  CBC:   Recent Labs   Lab 08/15/23  0320   WBC 8.89   HGB 7.4*   HCT 22.7*          CMP:   Recent Labs   Lab 08/15/23  0320   *   K 3.2*   CL 94*   CO2 24   *   BUN 80*   CREATININE 2.3*   CALCIUM 8.6*   ANIONGAP 16         Significant Imaging: I have reviewed all pertinent imaging results/findings within the past 24 hours.

## 2023-08-15 NOTE — PLAN OF CARE
LINCOLN called Ebonie at [facility] to inquire if could email SW the application. Ebonie stated that she will send it.     LINCOLN assisted patient in completing paperwork for nursing home placement at [facility]. LINCOLN emailed completed application back to facility.

## 2023-08-16 VITALS
SYSTOLIC BLOOD PRESSURE: 135 MMHG | TEMPERATURE: 98 F | DIASTOLIC BLOOD PRESSURE: 75 MMHG | WEIGHT: 226 LBS | BODY MASS INDEX: 31.64 KG/M2 | HEIGHT: 71 IN | OXYGEN SATURATION: 96 % | HEART RATE: 68 BPM | RESPIRATION RATE: 20 BRPM

## 2023-08-16 LAB
POCT GLUCOSE: 260 MG/DL (ref 70–110)
POCT GLUCOSE: 94 MG/DL (ref 70–110)
SARS-COV-2 RDRP RESP QL NAA+PROBE: NEGATIVE

## 2023-08-16 PROCEDURE — U0002 COVID-19 LAB TEST NON-CDC: HCPCS | Performed by: HOSPITALIST

## 2023-08-16 PROCEDURE — 25000003 PHARM REV CODE 250: Performed by: NURSE PRACTITIONER

## 2023-08-16 PROCEDURE — 25000003 PHARM REV CODE 250: Performed by: HOSPITALIST

## 2023-08-16 PROCEDURE — 25000242 PHARM REV CODE 250 ALT 637 W/ HCPCS: Performed by: NURSE PRACTITIONER

## 2023-08-16 PROCEDURE — 25000003 PHARM REV CODE 250: Performed by: STUDENT IN AN ORGANIZED HEALTH CARE EDUCATION/TRAINING PROGRAM

## 2023-08-16 PROCEDURE — 25000003 PHARM REV CODE 250

## 2023-08-16 RX ORDER — HYDROCODONE BITARTRATE AND ACETAMINOPHEN 5; 325 MG/1; MG/1
1 TABLET ORAL EVERY 8 HOURS PRN
Qty: 10 TABLET | Refills: 0 | Status: ON HOLD | OUTPATIENT
Start: 2023-08-16 | End: 2023-09-27 | Stop reason: HOSPADM

## 2023-08-16 RX ADMIN — AMIODARONE HYDROCHLORIDE 200 MG: 200 TABLET ORAL at 08:08

## 2023-08-16 RX ADMIN — FOLIC ACID 1 MG: 1 TABLET ORAL at 08:08

## 2023-08-16 RX ADMIN — FUROSEMIDE 80 MG: 40 TABLET ORAL at 05:08

## 2023-08-16 RX ADMIN — OXYCODONE HYDROCHLORIDE AND ACETAMINOPHEN 500 MG: 500 TABLET ORAL at 08:08

## 2023-08-16 RX ADMIN — ATORVASTATIN CALCIUM 80 MG: 40 TABLET, FILM COATED ORAL at 08:08

## 2023-08-16 RX ADMIN — INSULIN ASPART 6 UNITS: 100 INJECTION, SOLUTION INTRAVENOUS; SUBCUTANEOUS at 11:08

## 2023-08-16 RX ADMIN — METOPROLOL SUCCINATE 25 MG: 25 TABLET, EXTENDED RELEASE ORAL at 08:08

## 2023-08-16 RX ADMIN — APIXABAN 5 MG: 5 TABLET, FILM COATED ORAL at 08:08

## 2023-08-16 RX ADMIN — AMLODIPINE BESYLATE 10 MG: 5 TABLET ORAL at 08:08

## 2023-08-16 RX ADMIN — GABAPENTIN 100 MG: 100 CAPSULE ORAL at 08:08

## 2023-08-16 RX ADMIN — ALLOPURINOL 100 MG: 100 TABLET ORAL at 08:08

## 2023-08-16 RX ADMIN — INSULIN DETEMIR 7 UNITS: 100 INJECTION, SOLUTION SUBCUTANEOUS at 08:08

## 2023-08-16 RX ADMIN — TAMSULOSIN HYDROCHLORIDE 0.4 MG: 0.4 CAPSULE ORAL at 08:08

## 2023-08-16 RX ADMIN — CAPSAICIN: 0.25 CREAM TOPICAL at 08:08

## 2023-08-16 RX ADMIN — FUROSEMIDE 80 MG: 40 TABLET ORAL at 08:08

## 2023-08-16 RX ADMIN — THERA TABS 1 TABLET: TAB at 08:08

## 2023-08-16 RX ADMIN — ALBUTEROL SULFATE 2.5 MG: 2.5 SOLUTION RESPIRATORY (INHALATION) at 06:08

## 2023-08-16 RX ADMIN — LIDOCAINE 5% 2 PATCH: 700 PATCH TOPICAL at 05:08

## 2023-08-16 NOTE — NURSING
Received report care assumed. Patient lying in bed resting, NAD noted. Safety precautions maintained.     [facility]  Nurses Note -- 4 Eyes      8/15/2023       Skin assessed on: Q Shift      [] No Pressure Injuries Present    []Prevention Measures Documented    [x] Yes LDA  for Pressure Injury Previously documented     [] Yes New Pressure Injury Discovered   [] LDA for New Pressure Injury Added      Attending RN:  Ethel Hernandez LPN     Second RN:  Roberto Allen RN

## 2023-08-16 NOTE — PLAN OF CARE
[facility]     Department of Hospital Medicine     [address] [place] [address]     [phone] after hours  [phone] fax       NURSING HOME ORDERS    08/16/2023    Admit to Nursing Home:  Regular Bed         Diagnoses:  Active Hospital Problems    Diagnosis  POA    *acute on chronic diastolic heart failure [I50.32]  Yes    Discharge planning issues [Z02.9]  Not Applicable    Effusion of left knee [M25.462]  Yes    Multiple wounds [T07.XXXA]  Yes     Chronic    COVID-19 virus infection [U07.1]  Yes    Debility Due to left knee pain and effusion due to gout? and/or injury [R53.81]  Yes    Severe malnutrition [E43]  Yes    Acute hypoxemic respiratory failure [J96.01]  Yes    Anasarca [R60.1]  Yes    CKD (chronic kidney disease), stage IV [N18.4]  Yes    Paroxysmal atrial flutter [I48.92]  Yes     Chronic    BPH (benign prostatic hyperplasia) [N40.0]  Yes    Alcohol abuse [F10.10]  Yes    Hypokalemia [E87.6]  Yes    Essential hypertension [I10]  Yes    Dyslipidemia [E78.5]  Yes    Type 2 diabetes mellitus with kidney complication, with long-term current use of insulin [E11.29, Z79.4]  Not Applicable     Chronic    Anemia [D64.9]  Yes    Fever [R50.9]  Yes      Resolved Hospital Problems    Diagnosis Date Resolved POA    DM (diabetes mellitus) type II controlled with renal manifestation [E11.29] 08/03/2023 Yes       Patient is homebound due to:  Chronic diastolic heart failure    Allergies:Review of patient's allergies indicates:  No Known Allergies    Vitals:       Every shift (Skilled Nursing patients)    Diet: diabetic/cardiac. Fluid restrict 1500cc   Supplement:  1 can every three times a day with meals                         Type:     Glucerna       Acitivities:      - Up in a chair each morning as tolerated   - Ambulate with assistance to bathroom   - Scheduled walks once each shift (every 8 hours)    LABS:  Per facility protocol    Nursing Precautions:      - Aspiration precautions:             - Total assistance with meals            -  Upright 90 degrees befor during and after meals             -  Suction at bedside          - Fall precautions per nursing home protocol   - Seizure precaution per residential protocol   - Decubitus precautions:        -  for positioning   - Pressure reducing foam mattress   - Turn patient every two hours. Use wedge pillows to anchor patient    CONSULTS:     Physical Therapy to evaluate and treat     Occupational Therapy to evaluate and treat     Speech Therapy  to evaluate and treat     Nutrition to evaluate and recommend diet         MISCELLANEOUS CARE:       Routine Skin for Bedridden Patients:  Apply moisture barrier cream to all    skin folds and wet areas in perineal area daily and after baths and                           all bowel movements.    Wound care: Local wound care to right dorsal foot wound daily- Cleanse with Vashe. Apply Vashe moistened gauze to ulcer and cover with Mepilex 4x4 border dressing.  1. Gluteal cleft - excoriation- Wound care consult from previous admission few days ago->>cleanse with vashe and dress with mepilex and change daily and prn   2. Right buttock lesion from shingles - no vesicles>>Cleanse with vashe and put hydrogel to right buttock   External jackson catheter to divert urine from wounds  Right lateral back underneath armpit blisters- keep clean and dry  Right foot ventral aspect skin tear- keep clean and dry    DIABETES CARE:      Check blood sugar:       Fingerstick blood sugar AC and HS   Fingerstick blood sugar every 6 hours if unable to eat      Report CBG < 60 or > 400 to physician.                                          Insulin Sliding Scale          Glucose  Novolog Insulin Subcutaneous        0 - 60   Orange juice or glucose tablet, hold insulin      No insulin   201-250  2 units   251-300  4 units   301-350  6 units   351-400  8 units   >400   10 units then call  physician      Medications: Discontinue all previous medication orders, if any. See new list below.       Medication List        START taking these medications      allopurinoL 100 MG tablet  Commonly known as: ZYLOPRIM  Take 1 tablet (100 mg total) by mouth once daily.     benzonatate 100 MG capsule  Commonly known as: TESSALON  Take 1 capsule (100 mg total) by mouth 3 (three) times daily as needed for Cough.     folic acid 1 MG tablet  Commonly known as: FOLVITE  Take 1 tablet (1 mg total) by mouth once daily.     HYDROcodone-acetaminophen 5-325 mg per tablet  Commonly known as: NORCO  Take 1 tablet by mouth every 8 (eight) hours as needed for Pain.  Replaces: HYDROcodone-acetaminophen  mg per tablet     insulin detemir U-100 (Levemir) 100 unit/mL (3 mL) Inpn pen  Inject 7 Units into the skin 2 (two) times daily.     LIDOcaine 5 %  Commonly known as: LIDODERM  Place 2 patches onto the skin once daily. Remove & Discard patch within 12 hours or as directed by MD            CHANGE how you take these medications      furosemide 80 MG tablet  Commonly known as: LASIX  Take 1 tablet (80 mg total) by mouth 2 (two) times daily.  What changed:   medication strength  how much to take            CONTINUE taking these medications      albuterol 90 mcg/actuation inhaler  Commonly known as: PROVENTIL/VENTOLIN HFA  Inhale 2 puffs into the lungs every 6 (six) hours as needed for Wheezing or Shortness of Breath.     amiodarone 200 MG Tab once daily   Commonly known as: PACERONE     amLODIPine 10 MG tablet once daily   Commonly known as: NORVASC  Take 1 tablet (10 mg total) by mouth once daily.     ELIQUIS 5 mg Tab by mouth BID  Generic drug: apixaban     gabapentin 100 MG capsule  Commonly known as: NEURONTIN  Take 1 capsule (100 mg total) by mouth 2 (two) times daily.     hydrALAZINE 100 MG tablet  Commonly known as: APRESOLINE  Take 1 tablet (100 mg total) by mouth every 8 (eight) hours.     insulin aspart U-100 100  unit/mL (3 mL) Inpn pen  Commonly known as: NovoLOG  Inject 5 Units into the skin 3 (three) times daily.     metoprolol succinate 25 MG 24 hr tablet  once daily   Commonly known as: TOPROL-XL     potassium chloride 10 MEQ Tbsr  once daily   Commonly known as: KLOR-CON     rosuvastatin 40 MG Tab  once daily   Commonly known as: CRESTOR     tamsulosin 0.4 mg Cap  Commonly known as: FLOMAX  Take 1 capsule (0.4 mg total) by mouth once daily.            STOP taking these medications      diclofenac sodium 1 % Gel  Commonly known as: VOLTAREN     HYDROcodone-acetaminophen  mg per tablet  Commonly known as: NORCO  Replaced by: HYDROcodone-acetaminophen 5-325 mg per tablet     metOLazone 5 MG tablet  Commonly known as: ZAROXOLYN     NovoLIN 70/30 U-100 Insulin 100 unit/mL (70-30) injection  Generic drug: insulin NPH-insulin regular (70/30)     sildenafiL 100 MG tablet  Commonly known as: VIAGRA               Where to Get Your Medications        These medications were sent to [facility] [address] [facility] [address] [address] [facility] [address], [facility] [address]      Phone: [phone]   HYDROcodone-acetaminophen 5-325 mg per tablet       These medications were sent to [id number] [address] [facility] & [place] [address] [address] [facility] [facility] [address]      Hours: 24-hours Phone: [phone]   folic acid 1 MG tablet  furosemide 80 MG tablet  LIDOcaine 5 %       Information about where to get these medications is not yet available    Ask your nurse or doctor about these medications  allopurinoL 100 MG tablet  benzonatate 100 MG capsule  insulin detemir U-100 (Levemir) 100 unit/mL (3 mL) Inpn pen                 _________________________________  Nhung Martínez MD  08/16/2023

## 2023-08-16 NOTE — PLAN OF CARE
Problem: Impaired Wound Healing  Goal: Optimal Wound Healing  Outcome: Met     Problem: Fall Injury Risk  Goal: Absence of Fall and Fall-Related Injury  8/16/2023 1634 by Roberto Allen, RN  Outcome: Met  8/16/2023 1634 by Roberto Allen, RN  Outcome: Met

## 2023-08-16 NOTE — PLAN OF CARE
LINCOLN notified nurse Roberto that patient is ready for discharge from case management standpoint. LINCOLN notified sister that patient is discharging to [facility].     Call Report to [phone]. Patient is going to Room 14 B.     ADT 30 order placed for Van Transportation.  Requested  time: 5:00 pm Ambulance  If transportation does not arrive at ETA time nurse will be instructed to follow protocol for transportation below:  How can I get in touch directly with dispatch, if needed?                 Non-emergent dispatch: [phone] option 6    +++NURSING:  If Van does not arrive at requested time please call the above Non Emergent Dispatcher.  If issue not resolved please escalate to your charge nurse for further instructions.       08/16/23 1448   Final Note   Assessment Type Final Discharge Note   Anticipated Discharge Disposition shelter [facility]   What phone number can be called within the next 1-3 days to see how you are doing after discharge? [phone]   Post-Acute Status   Post-Acute Authorization Placement  (shelter Placement)   Post-Acute Placement Status Set-up Complete/Auth obtained   Coverage [employer]   Discharge Delays None known at this time

## 2023-08-16 NOTE — PLAN OF CARE
Spoke with Ebonie at [facility] - Patient can discharge today. Need to upload POC, PASRR and 142 to [facility].

## 2023-08-16 NOTE — DISCHARGE SUMMARY
[facility] Medicine  Discharge Summary      Patient Name: Chato Bowie  MRN: [id number]  Patient Class: IP- Inpatient  Admission Date: 7/27/2023  Hospital Length of Stay: 17 days  Discharge Date and Time:  08/16/2023 12:53 PM  Attending Physician: Nhung Martínez MD   Discharging Provider: Nhung Martínez MD  Primary Care Provider: [facility] -      HPI:   Chato Bowie 72 y.o. male with CHF, CAD, DM, HTN, HLD, presents to the hospital with a chief complaint of BLE swelling and pain.  onset several weeks ago per chart review, patient was recently discharged from the ED yesterday for similar complaints, and was discharged from inpatient status on 07/25/2023. the patient had endorsed no relief to his complaints since discharge. Patient currently reports complaints of BLE swelling with associated pain. He states his complaints have been ongoing for a few weeks, but had worsened as of recent. He additionally reports he last had similar complaints a month ago, noting he had to be admitted for a couple of nights. He endorses compliance with antihypertensives and diuretics. He reports following with his PCP at [facility], but reports he does not know who is his cardiologist. Denies fever, shortness of breath, or cough.  Patient's niece states that the patient does not live in assisted living however he does need it as she can not provide care for him especially due to him being COVID positive.  No other alleviating or exacerbating factors noted.    In the ED patient was hypertensive (181/140) afebrile without leukocytosis, mild normocytic anemia noted, BUN 46, creatinine 2.7, EGFR 24, glucose 136, albumin 2.3, , TSH WNL, COVID positive, flu negative, UA showed +1 protein, +2 occult blood, CXR was negative for acute process.  Patient was placed in observation      * No surgery found *      Hospital Course:   Admission to hospital for BLE edema up hips due to acute on chronic diastolic heart  failure and malnutrition. Patient also found positive COVID 19 -asymptomatic on admission so Remdesivir discontinued. Edema improving with IV diuresis.  Became hypoxic night 7/29 requiring 2 L NC. Started Remdesivir/decadron (7/30), repeat CXR. Hypoglycemia (7/29) improved with oral intake and holding insulin.   He also have left  knee pain and edema similar to previous admission in June where Orthopedic surgery suspected gouty arthritis and attempted aspiration but only able to give steroid injection with improvement. No colchicine due to CKD. Left knee x ray on 7/17/23 showed significant suprapatellar effusion. Due to pain and decrease mobility, will ask Orthopedic Surgery to evaluate.  Hold apixaban  (last dose 7/29 2221).   Hgb treaded down 8.2>7.5>7.1>6.5 without over signs of bleeding. Had normal BM on 7/29 no melena or hematochezia. Previous iron studies low iron/t stat and elevated ferritin, normal vitamin b 12, and low folic acid. Start folic acid, ferrous sulfate and transfuse 1 unit of PRBC.   Recent admission 7/17-7/25 for sepsis due to shingles/cellulitis and ELIZABETH. Right buttock lesion from shingles are all in different part of healing phase without drainage. Also have gluteal cleft excoriation. Continue wound care as recommended by wound care team on previous admission-Cleanse both area with vashe, put mepilex to gluteal cleft, and put hydrogel to shingle lesion.   Also have right upper lateral back below axillary unroof blister and right foot ventral aspect skin tear. Keep clean and dry.     PT/OT evaluation completed. TN/SW to assist with SNF.                Goals of Care Treatment Preferences:  Code Status: Full Code      Consults:   Consults (From admission, onward)        Status Ordering Provider     Inpatient virtual consult to Hospital Medicine  Once        Provider:  Nhung Martínze MD    Completed BIRDIE YOUSSEF III     Inpatient virtual consult to Hospital Medicine  Once        Provider:   Nhung Martínez MD    Completed LANA LOPEZ     Case Management/  Once        Provider:  (Not yet assigned)    Completed ERNESTO MCCORMACK     Inpatient consult to Social Work  Once        Provider:  (Not yet assigned)    Completed NURA CUNNINGHAM          No new Assessment & Plan notes have been filed under this hospital service since the last note was generated.  Service: Hospital Medicine    Final Active Diagnoses:    Diagnosis Date Noted POA    PRINCIPAL PROBLEM:  acute on chronic diastolic heart failure [I50.32] 05/12/2022 Yes    Discharge planning issues [Z02.9] 08/12/2023 Not Applicable    Effusion of left knee [M25.462] 07/30/2023 Yes    Multiple wounds [T07.XXXA] 07/28/2023 Yes     Chronic    COVID-19 virus infection [U07.1] 07/27/2023 Yes    Debility Due to left knee pain and effusion due to gout? and/or injury [R53.81] 06/03/2023 Yes    Severe malnutrition [E43] 05/12/2022 Yes    Acute hypoxemic respiratory failure [J96.01] 05/12/2022 Yes    Anasarca [R60.1] 02/08/2022 Yes    CKD (chronic kidney disease), stage IV [N18.4] 03/23/2021 Yes    Paroxysmal atrial flutter [I48.92] 12/07/2020 Yes     Chronic    BPH (benign prostatic hyperplasia) [N40.0] 11/24/2020 Yes    Alcohol abuse [F10.10] 02/14/2019 Yes    Hypokalemia [E87.6] 02/14/2019 Yes    Essential hypertension [I10] 03/10/2017 Yes    Dyslipidemia [E78.5] 03/10/2017 Yes    Type 2 diabetes mellitus with kidney complication, with long-term current use of insulin [E11.29, Z79.4] 03/10/2017 Not Applicable     Chronic    Anemia [D64.9] 03/10/2017 Yes    Fever [R50.9] 03/07/2016 Yes      Problems Resolved During this Admission:    Diagnosis Date Noted Date Resolved POA    DM (diabetes mellitus) type II controlled with renal manifestation [E11.29] 05/12/2022 08/03/2023 Yes       Discharged Condition: fair    Disposition: Home or Self Care    Follow Up:   Follow-up Information     primary care physician. Call in 2  days.    Why: As needed, If symptoms worsen                     Patient Instructions:      Ambulatory referral/consult to Pulmonology   Standing Status: Future   Referral Priority: Routine Referral Type: Consultation   Referral Reason: Specialty Services Required   Requested Specialty: Pulmonary Disease   Number of Visits Requested: 1     Ambulatory referral/consult to HOME Palliative Care   Standing Status: Future   Referral Priority: Routine Referral Type: Consultation   Requested Specialty: Hospice and Palliative Medicine   Number of Visits Requested: 1     Diet Cardiac     Notify your health care provider if you experience any of the following:  temperature >100.4     Notify your health care provider if you experience any of the following:  persistent nausea and vomiting or diarrhea     Notify your health care provider if you experience any of the following:  severe uncontrolled pain     Notify your health care provider if you experience any of the following:  redness, tenderness, or signs of infection (pain, swelling, redness, odor or green/yellow discharge around incision site)     Notify your health care provider if you experience any of the following:  difficulty breathing or increased cough     Notify your health care provider if you experience any of the following:  severe persistent headache     Notify your health care provider if you experience any of the following:  worsening rash     Notify your health care provider if you experience any of the following:  persistent dizziness, light-headedness, or visual disturbances     Notify your health care provider if you experience any of the following:  increased confusion or weakness     Send follow up & questions to   Order Comments: Patient's primary care physician: Irlanda Valenzuela -     Activity as tolerated       Significant Diagnostic Studies: Labs:   CMP   Recent Labs   Lab 08/15/23  0320   *   K 3.2*   CL 94*   CO2 24   *   BUN 80*    CREATININE 2.3*   CALCIUM 8.6*   ANIONGAP 16    and CBC   Recent Labs   Lab 08/15/23  0320   WBC 8.89   HGB 7.4*   HCT 22.7*          Pending Diagnostic Studies:     None         Medications:  Reconciled Home Medications:      Medication List      START taking these medications    allopurinoL 100 MG tablet  Commonly known as: ZYLOPRIM  Take 1 tablet (100 mg total) by mouth once daily.     benzonatate 100 MG capsule  Commonly known as: TESSALON  Take 1 capsule (100 mg total) by mouth 3 (three) times daily as needed for Cough.     folic acid 1 MG tablet  Commonly known as: FOLVITE  Take 1 tablet (1 mg total) by mouth once daily.     HYDROcodone-acetaminophen 5-325 mg per tablet  Commonly known as: NORCO  Take 1 tablet by mouth every 8 (eight) hours as needed for Pain.  Replaces: HYDROcodone-acetaminophen  mg per tablet     insulin detemir U-100 (Levemir) 100 unit/mL (3 mL) Inpn pen  Inject 7 Units into the skin 2 (two) times daily.     LIDOcaine 5 %  Commonly known as: LIDODERM  Place 2 patches onto the skin once daily. Remove & Discard patch within 12 hours or as directed by MD        CHANGE how you take these medications    furosemide 80 MG tablet  Commonly known as: LASIX  Take 1 tablet (80 mg total) by mouth 2 (two) times daily.  What changed:   · medication strength  · how much to take        CONTINUE taking these medications    albuterol 90 mcg/actuation inhaler  Commonly known as: PROVENTIL/VENTOLIN HFA  Inhale 2 puffs into the lungs every 6 (six) hours as needed for Wheezing or Shortness of Breath.     amiodarone 200 MG Tab  Commonly known as: PACERONE  Take 200 mg by mouth once daily.     amLODIPine 10 MG tablet  Commonly known as: NORVASC  Take 1 tablet (10 mg total) by mouth once daily.     ELIQUIS 5 mg Tab  Generic drug: apixaban  Take 5 mg by mouth 2 (two) times daily.     gabapentin 100 MG capsule  Commonly known as: NEURONTIN  Take 1 capsule (100 mg total) by mouth 2 (two) times daily.      hydrALAZINE 100 MG tablet  Commonly known as: APRESOLINE  Take 1 tablet (100 mg total) by mouth every 8 (eight) hours.     insulin aspart U-100 100 unit/mL (3 mL) Inpn pen  Commonly known as: NovoLOG  Inject 5 Units into the skin 3 (three) times daily.     magnesium oxide 400 mg (241.3 mg magnesium) tablet  Commonly known as: MAG-OX  Take 800 mg by mouth.     metoprolol succinate 25 MG 24 hr tablet  Commonly known as: TOPROL-XL  Take 25 mg by mouth once daily.     potassium chloride 10 MEQ Tbsr  Commonly known as: KLOR-CON  Take 10 mEq by mouth once daily.     rosuvastatin 40 MG Tab  Commonly known as: CRESTOR  Take 40 mg by mouth every evening.     tamsulosin 0.4 mg Cap  Commonly known as: FLOMAX  Take 1 capsule (0.4 mg total) by mouth once daily.        STOP taking these medications    diclofenac sodium 1 % Gel  Commonly known as: VOLTAREN     HYDROcodone-acetaminophen  mg per tablet  Commonly known as: NORCO  Replaced by: HYDROcodone-acetaminophen 5-325 mg per tablet     metOLazone 5 MG tablet  Commonly known as: ZAROXOLYN     NovoLIN 70/30 U-100 Insulin 100 unit/mL (70-30) injection  Generic drug: insulin NPH-insulin regular (70/30)     sildenafiL 100 MG tablet  Commonly known as: VIAGRA            Indwelling Lines/Drains at time of discharge:   Lines/Drains/Airways     Drain  Duration           Female External Urinary Catheter 08/08/23 1844 7 days                Time spent on the discharge of patient: 39 minutes         The attending portion of this evaluation, treatment, and documentation was performed per Nhung Martínez MD via Telemedicine AudioVisual using the secure [employer] software platform with 2 way audio/video. The provider was located off-site and the patient is located in the hospital. The aforementioned video software was utilized to document the relevant history and physical exam    Nhung Martínez MD  Department of Hospital Medicine  [facility]

## 2023-08-16 NOTE — NURSING
[facility], [facility] [place]  Nurses Note -- 4 Eyes      8/16/2023       Skin assessed on: Discharge      [x] No Pressure Injuries Present    [x]Prevention Measures Documented    Patient has Altered Skin Integrity from post-shingles virus.  Documented on LDA      [] Yes LDA  for Pressure Injury Previously documented     [] Yes New Pressure Injury Discovered   [] LDA for New Pressure Injury Added      Attending RN:  Roberto Allen RN     Second RN:  Pavithra HERNANDEZ RN

## 2023-08-16 NOTE — PLAN OF CARE
No acute distress noted, patient free from falls or injury this shift.  Bed in low position, wheels locked, call light in reach for assistance, plan of care continued.       Problem: Impaired Wound Healing  Goal: Optimal Wound Healing  Outcome: Ongoing, Progressing     Problem: Fall Injury Risk  Goal: Absence of Fall and Fall-Related Injury  Outcome: Ongoing, Progressing

## 2023-08-16 NOTE — NURSING
[facility], [facility] [place]  Nurses Note -- 4 Eyes      8/16/2023       Skin assessed on: Q Shift      [x] No Pressure Injuries Present    [x]Prevention Measures Documented       Patient has Altered Skin Integrity from post-shingles virus.  Documented on LDA    [] Yes LDA  for Pressure Injury Previously documented     [] Yes New Pressure Injury Discovered   [] LDA for New Pressure Injury Added      Attending RN:  Roberto Allen RN     Second RN:  Ethel MARTINEZ LPN

## 2023-08-16 NOTE — PLAN OF CARE
Problem: Impaired Wound Healing  Goal: Optimal Wound Healing  Outcome: Ongoing, Progressing     Problem: Fall Injury Risk  Goal: Absence of Fall and Fall-Related Injury  Outcome: Ongoing, Progressing      

## 2023-08-17 ENCOUNTER — PATIENT OUTREACH (OUTPATIENT)
Dept: ADMINISTRATIVE | Facility: CLINIC | Age: 73
End: 2023-08-17
Payer: MEDICARE

## 2023-08-17 NOTE — NURSING
Patient cleared for discharge by  and Guerita, case management.   Report called to nurse Cortez. Transport requested by  for 1700. Left AC PIV removed, catheter tip intact. ADLs completed, patient's belongings packed.

## 2023-08-18 NOTE — PHYSICIAN QUERY
PT Name: Chato Bowie  MR #: [id number]    DOCUMENTATION CLARIFICATION     CDS/: Mandi Sandhu RN CDIS             Contact information:  [email or web address]      This form is a permanent document in the medical record.     Query Date: August 18, 2023    By submitting this query, we are merely seeking further clarification of documentation.. Please utilize your independent clinical judgment when addressing the question(s) below.    The medical record contains the following:   Indicators  Supporting Clinical Findings Location in Medical Record   x Energy Intake Energy Calories Required: meeting needs RD note 8/14     Weight Loss      Fat Loss      Muscle Loss      Edema/Fluid Accumulation      Reduced  Strength (by dynamometer)     x Weight, BMI, Usual Body Weight Weight Method: Bed Scale  Weight: 102.5 kg (226 lb)  BMI (Calculated): 31.5 RD note 8/14     Delayed Wound Healing     x Registered Dietician Diagnosis No nutrition dx at this time RD note 8/14     Acute or Chronic Illness      Social or Environmental Circumstances     x Treatment Continue to monitor PO intake of meals and snacks  If intake remains below 50%, Consider ONS Boost to help meet EEN/EPN  Continue to monitor weights and labs  Collaboration with medical providers RD note 8/14    x Other Severe malnutrition  -Added protein supplementations with meals Hm ntoe 8/15      Academy of Nutrition and Dietetics (Academy) and the American Society for Parenteral and Enteral Nutrition (A.S.P.E.N.) Clinical Characteristics to support Malnutrition   Malnutrition in the Context of Acute Illness or Injury Malnutrition in the Context of Chronic Illness or Injury Malnutrition in the Context of Social or Environmental Circumstances   Malnutrition Level Moderate Severe Moderate Severe   Moderate   Severe   Energy Intake <75%                   >7 days <50%                 >5 days <75%           >1 month <75%                      >1 month   <75% for >3  months   <50% for >1 month   Weight Loss   1-2% in 1 week >2% in 1 week 5% in 1 month >5% in 1 month 5% in 1 month >5% in 1 month    5% in 1 month >5% in 1 month 7.5% in 3 months >7.5% in 3 months 7.5% in 3 months >7.5% in 3 months    7.5% in 3 months >7.5% in 3 months 10% in 6 months >10% in 6 months 10% in 6 months >10% in 6 months        20% in 1 year                    >20% in 1 year                                                                  20% in 1 year                            >20% in 1 year                                                  Subcutaneous Fat Loss Mild  Moderate  Mild  Severe    Mild   Severe   Muscle Loss Mild  Moderate  Mild  Severe    Mild   Severe   Edema/Fluid Accumulation Mild Moderate to severe  Mild  Severe   Mild   Severe   Reduced  Strength         (based on standards supplied by  of dynamometer) N/A Measurably reduced N/A Measurably reduced N/A Measurably reduced     Criteria for mild malnutrition is defined as 1 characteristic outlined above within the established moderate or severe parameters.  A minimum of 2 out of the 6 characteristics noted above are recommended for a diagnosis of moderate or severe malnutrition.  Chronic illness/injury is a disease/condition lasting 3 months or longer.    The noted clinical guidelines are only system guidelines and do not replace the providers clinical judgment.    Due to the conflicting clinical picture, please clinically validate the diagnosis of _Severe malnutrition_.    If validated, please provide additional clinical support for the diagnosis.       [   x] Severe malnutrition is not confirmed and/or it has been ruled out   [   ] Severe malnutrition is not confirmed and/or it has been ruled out, other diagnosis ruled in (please          specify):_______________     [   ] Severe malnutrition is confirmed. Please specify clinical support (signs, symptoms, and treatment) for  the confirmed diagnosis:  ____________________     [   ] Other clarification (please specify): ___________________             Please document in your progress notes daily for the duration of treatment until resolved and  include in your discharge summary.      References:    BRIAN Villatoro, & ZACHARY Guevara (2022, April). Assessment and management of anorexia and cachexia in palliative care. Retrieved May 23, 2022, from https://www.[facility]/contents/assessment-and-management-of-anorexia-and-cachexia-in-palliative-care?[email or web address]=8230&source=see_link     ROBERTH Murray, PhD, RD, Xu FUENTES P., PhD, RN, SANAM Suazo MD, PhD, Mike GIMENEZ A., MS, RD, [facility], ZOEY Huang, MS, RD, The Academy Malnutrition Work Group, The A.S.P.E.N. Board of Directors. (2012). Consensus Statement: Academy of Nutrition and Dietetics and American Society for Parenteral and Enteral Nutrition: Characteristics Recommended for the Identification and Documentation of Adult Malnutrition (Undernutrition). Journal of Parenteral and Enteral Nutrition, 36(3), 275-283. doi:10.1177/1844171414447400     Form No. 54218

## 2023-08-18 NOTE — NURSING
Guerita, CM came to bedside retreive patient's pay card as requested by receiving facility. Pay card returned to room by CM and placed in patient's wallet by RN while CM at bedside as patient witnessed.

## 2023-09-19 ENCOUNTER — HOSPITAL ENCOUNTER (INPATIENT)
Facility: HOSPITAL | Age: 73
LOS: 2 days | Discharge: HOME | DRG: 291 | End: 2023-09-23
Attending: EMERGENCY MEDICINE | Admitting: INTERNAL MEDICINE
Payer: MEDICARE

## 2023-09-19 DIAGNOSIS — R07.9 CHEST PAIN: ICD-10-CM

## 2023-09-19 DIAGNOSIS — N17.9 ACUTE KIDNEY INJURY SUPERIMPOSED ON CKD: ICD-10-CM

## 2023-09-19 DIAGNOSIS — I50.9 DECOMPENSATED HEART FAILURE: Primary | ICD-10-CM

## 2023-09-19 DIAGNOSIS — I35.0 AORTIC VALVE STENOSIS, ETIOLOGY OF CARDIAC VALVE DISEASE UNSPECIFIED: ICD-10-CM

## 2023-09-19 DIAGNOSIS — N18.9 ACUTE KIDNEY INJURY SUPERIMPOSED ON CKD: ICD-10-CM

## 2023-09-19 LAB
ALBUMIN SERPL BCP-MCNC: 2.3 G/DL (ref 3.5–5.2)
ALP SERPL-CCNC: 92 U/L (ref 55–135)
ALT SERPL W/O P-5'-P-CCNC: 22 U/L (ref 10–44)
ANION GAP SERPL CALC-SCNC: 15 MMOL/L (ref 8–16)
AST SERPL-CCNC: 24 U/L (ref 10–40)
BASOPHILS # BLD AUTO: 0.05 K/UL (ref 0–0.2)
BASOPHILS NFR BLD: 0.5 % (ref 0–1.9)
BILIRUB SERPL-MCNC: 0.8 MG/DL (ref 0.1–1)
BNP SERPL-MCNC: 1100 PG/ML (ref 0–99)
BUN SERPL-MCNC: 69 MG/DL (ref 8–23)
CALCIUM SERPL-MCNC: 9.1 MG/DL (ref 8.7–10.5)
CHLORIDE SERPL-SCNC: 97 MMOL/L (ref 95–110)
CO2 SERPL-SCNC: 25 MMOL/L (ref 23–29)
CREAT SERPL-MCNC: 3.5 MG/DL (ref 0.5–1.4)
DIFFERENTIAL METHOD: ABNORMAL
EOSINOPHIL # BLD AUTO: 0.3 K/UL (ref 0–0.5)
EOSINOPHIL NFR BLD: 2.8 % (ref 0–8)
ERYTHROCYTE [DISTWIDTH] IN BLOOD BY AUTOMATED COUNT: 15.9 % (ref 11.5–14.5)
EST. GFR  (NO RACE VARIABLE): 17.8 ML/MIN/1.73 M^2
GLUCOSE SERPL-MCNC: 123 MG/DL (ref 70–110)
HCT VFR BLD AUTO: 23.5 % (ref 40–54)
HGB BLD-MCNC: 7.4 G/DL (ref 14–18)
IMM GRANULOCYTES # BLD AUTO: 0.06 K/UL (ref 0–0.04)
IMM GRANULOCYTES NFR BLD AUTO: 0.6 % (ref 0–0.5)
LYMPHOCYTES # BLD AUTO: 0.7 K/UL (ref 1–4.8)
LYMPHOCYTES NFR BLD: 7.2 % (ref 18–48)
MCH RBC QN AUTO: 24.7 PG (ref 27–31)
MCHC RBC AUTO-ENTMCNC: 31.5 G/DL (ref 32–36)
MCV RBC AUTO: 78 FL (ref 82–98)
MONOCYTES # BLD AUTO: 1 K/UL (ref 0.3–1)
MONOCYTES NFR BLD: 10.3 % (ref 4–15)
NEUTROPHILS # BLD AUTO: 7.7 K/UL (ref 1.8–7.7)
NEUTROPHILS NFR BLD: 78.6 % (ref 38–73)
NRBC BLD-RTO: 0 /100 WBC
PLATELET # BLD AUTO: 293 K/UL (ref 150–450)
PMV BLD AUTO: 8.1 FL (ref 9.2–12.9)
POC CARDIAC TROPONIN I: 0.03 NG/ML (ref 0–0.08)
POC CARDIAC TROPONIN I: 0.05 NG/ML (ref 0–0.08)
POTASSIUM SERPL-SCNC: 3.1 MMOL/L (ref 3.5–5.1)
PROT SERPL-MCNC: 7.3 G/DL (ref 6–8.4)
RBC # BLD AUTO: 3 M/UL (ref 4.6–6.2)
SAMPLE: NORMAL
SAMPLE: NORMAL
SODIUM SERPL-SCNC: 137 MMOL/L (ref 136–145)
TROPONIN I SERPL DL<=0.01 NG/ML-MCNC: 0.05 NG/ML (ref 0–0.03)
TROPONIN I SERPL DL<=0.01 NG/ML-MCNC: 0.06 NG/ML (ref 0–0.03)
TROPONIN I SERPL DL<=0.01 NG/ML-MCNC: 0.06 NG/ML (ref 0–0.03)
WBC # BLD AUTO: 9.75 K/UL (ref 3.9–12.7)

## 2023-09-19 PROCEDURE — 80053 COMPREHEN METABOLIC PANEL: CPT | Performed by: EMERGENCY MEDICINE

## 2023-09-19 PROCEDURE — 99285 EMERGENCY DEPT VISIT HI MDM: CPT | Mod: 25

## 2023-09-19 PROCEDURE — 93010 EKG 12-LEAD: ICD-10-PCS | Mod: ,,, | Performed by: INTERNAL MEDICINE

## 2023-09-19 PROCEDURE — 84484 ASSAY OF TROPONIN QUANT: CPT

## 2023-09-19 PROCEDURE — 83880 ASSAY OF NATRIURETIC PEPTIDE: CPT | Performed by: EMERGENCY MEDICINE

## 2023-09-19 PROCEDURE — 85025 COMPLETE CBC W/AUTO DIFF WBC: CPT | Performed by: EMERGENCY MEDICINE

## 2023-09-19 PROCEDURE — 25000003 PHARM REV CODE 250: Performed by: EMERGENCY MEDICINE

## 2023-09-19 PROCEDURE — G0378 HOSPITAL OBSERVATION PER HR: HCPCS

## 2023-09-19 PROCEDURE — 84484 ASSAY OF TROPONIN QUANT: CPT | Mod: 91 | Performed by: EMERGENCY MEDICINE

## 2023-09-19 PROCEDURE — 96374 THER/PROPH/DIAG INJ IV PUSH: CPT

## 2023-09-19 PROCEDURE — 93010 ELECTROCARDIOGRAM REPORT: CPT | Mod: ,,, | Performed by: INTERNAL MEDICINE

## 2023-09-19 PROCEDURE — 93005 ELECTROCARDIOGRAM TRACING: CPT

## 2023-09-19 PROCEDURE — 63600175 PHARM REV CODE 636 W HCPCS: Performed by: EMERGENCY MEDICINE

## 2023-09-19 RX ORDER — FUROSEMIDE 10 MG/ML
100 INJECTION INTRAMUSCULAR; INTRAVENOUS
Status: COMPLETED | OUTPATIENT
Start: 2023-09-19 | End: 2023-09-19

## 2023-09-19 RX ORDER — BENZONATATE 100 MG/1
100 CAPSULE ORAL 3 TIMES DAILY PRN
COMMUNITY
End: 2023-09-23

## 2023-09-19 RX ORDER — ASCORBIC ACID 500 MG
500 TABLET ORAL 2 TIMES DAILY
Status: ON HOLD | COMMUNITY
End: 2023-09-27 | Stop reason: HOSPADM

## 2023-09-19 RX ORDER — FERROUS SULFATE 325(65) MG
325 TABLET ORAL DAILY
COMMUNITY

## 2023-09-19 RX ORDER — MULTIVITAMIN
1 TABLET ORAL DAILY
COMMUNITY

## 2023-09-19 RX ORDER — POTASSIUM CHLORIDE 20 MEQ/1
40 TABLET, EXTENDED RELEASE ORAL ONCE
Status: COMPLETED | OUTPATIENT
Start: 2023-09-19 | End: 2023-09-19

## 2023-09-19 RX ADMIN — POTASSIUM CHLORIDE 40 MEQ: 1500 TABLET, EXTENDED RELEASE ORAL at 05:09

## 2023-09-19 RX ADMIN — FUROSEMIDE 100 MG: 10 INJECTION, SOLUTION INTRAMUSCULAR; INTRAVENOUS at 03:09

## 2023-09-19 NOTE — PHARMACY MED REC
"Admission Medication History     The home medication history was taken by Bipin Phelan.    You may go to "Admission" then "Reconcile Home Medications" tabs to review and/or act upon these items.     The home medication list has been updated by the Pharmacy department.   Please read ALL comments highlighted in yellow.   Please address this information as you see fit.    Feel free to contact us if you have any questions or require assistance.      The medications listed below were removed from the home medication list. Please reorder if appropriate:  Patient reports no longer taking the following medication(s):  NOVOLOG U-100 UNIT 3 ML PEN  MAGNESIUM OXIDE 400 MG TABLET      Current Outpatient Medications on File Prior to Encounter   Medication Sig    albuterol (PROVENTIL/VENTOLIN HFA) 90 mcg/actuation inhaler   Inhale 2 puffs into the lungs every 6 (six) hours as needed for Wheezing or Shortness of Breath.    allopurinoL (ZYLOPRIM) 100 MG tablet   Take 1 tablet (100 mg total) by mouth once daily.    amiodarone (PACERONE) 200 MG Tab   Take 200 mg by mouth once daily.    amLODIPine (NORVASC) 10 MG tablet   Take 1 tablet (10 mg total) by mouth once daily.    ascorbic acid, vitamin C, (VITAMIN C) 500 MG tablet   Take 500 mg by mouth 2 (two) times daily.    benzonatate (TESSALON) 100 MG capsule   Take 100 mg by mouth 3 (three) times daily as needed for Cough.    ELIQUIS 5 mg Tab   Take 5 mg by mouth 2 (two) times daily.    ferrous sulfate (FEOSOL) 325 mg (65 mg iron) Tab tablet   Take 325 mg by mouth once daily.    folic acid (FOLVITE) 1 MG tablet   Take 1 tablet (1 mg total) by mouth once daily.    furosemide (LASIX) 80 MG tablet   Take 1 tablet (80 mg total) by mouth 2 (two) times daily.    gabapentin (NEURONTIN) 100 MG capsule   Take 1 capsule (100 mg total) by mouth 2 (two) times daily.    hydrALAZINE (APRESOLINE) 100 MG tablet   Take 1 tablet (100 mg total) by mouth every 8 (eight) hours.    " HYDROcodone-acetaminophen (NORCO) 5-325 mg per tablet   Take 1 tablet by mouth every 8 (eight) hours as needed for Pain.    insulin detemir U-100, Levemir, 100 unit/mL (3 mL) SubQ InPn pen   Inject 7 Units into the skin 2 (two) times daily.    LIDOcaine (LIDODERM) 5 %       Place 2 patches onto the skin once daily. Remove & Discard patch within 12 hours or as directed by MD    metoprolol succinate (TOPROL-XL) 25 MG 24 hr tablet   Take 25 mg by mouth once daily.    multivitamin (ONE DAILY MULTIVITAMIN) per tablet   Take 1 tablet by mouth once daily.    potassium chloride (KLOR-CON) 10 MEQ TbSR   Take 10 mEq by mouth once daily.    rosuvastatin (CRESTOR) 40 MG Tab   Take 40 mg by mouth every evening.    tamsulosin (FLOMAX) 0.4 mg Cap   Take 1 capsule (0.4 mg total) by mouth once daily.       Bipin Phelan  EXT [id number]                  .

## 2023-09-19 NOTE — ED PROVIDER NOTES
Encounter Date: 9/19/2023       History     Chief Complaint   Patient presents with    Chest Pain     X 1 hour. Given 325 of aspirin and 1 nitro SG.      Chato Bowie is a 72 M hx of CHF, alcohol abuse, CAD, diabetes, hypertension, hyperlipidemia presenting today for chest pain.  Patient states symptoms started approximately 1 hour ago after eating sausage and toast for breakfast.  He reports pressure-like pain across his chest that is been constant and not relieved with nitroglycerin.  He also reports abdominal discomfort, bloating since this morning.  He denies cough, fever or chills.  He does report mild shortness of breath.  No cough.      Review of patient's allergies indicates:  No Known Allergies  Past Medical History:   Diagnosis Date    Acute congestive heart failure 3/20/2020    Alcohol abuse     Arthritis     Asthma attack 11/24/2021    Coronary artery disease     Diabetes mellitus     Hyperlipemia     Hypertension     Multiple thyroid nodules 6/14/2023    Rhabdomyolysis      Past Surgical History:   Procedure Laterality Date    EYE SURGERY      TRANSESOPHAGEAL ECHOCARDIOGRAM WITH POSSIBLE CARDIOVERSION (MARIA W/ POSS CARDIOVERSION) N/A 1/5/2023    Procedure: Transesophageal echo (MARIA) intra-procedure log documentation;  Surgeon: Indra Foote MD;  Location: [facility] CATH LAB;  Service: Cardiology;  Laterality: N/A;    TREATMENT OF CARDIAC ARRHYTHMIA N/A 12/7/2020    Procedure: Cardioversion or Defibrillation;  Surgeon: Indra Foote MD;  Location: [facility] CATH LAB;  Service: Cardiology;  Laterality: N/A;    TREATMENT OF CARDIAC ARRHYTHMIA N/A 12/30/2020    Procedure: Cardioversion or Defibrillation;  Surgeon: Tyrone Steen MD;  Location: [facility] CATH LAB;  Service: Cardiology;  Laterality: N/A;    TREATMENT OF CARDIAC ARRHYTHMIA N/A 1/5/2023    Procedure: Cardioversion or Defibrillation;  Surgeon: Indra Foote MD;  Location: [facility] CATH LAB;  Service: Cardiology;  Laterality: N/A;    VASCULAR  SURGERY       Family History   Problem Relation Age of Onset    Hypertension Mother     Diabetes Mother     Diabetes Father     Hypertension Father     Diabetes Sister     Hypertension Sister      Social History     Tobacco Use    Smoking status: Never    Smokeless tobacco: Never   Substance Use Topics    Alcohol use: Not Currently     Alcohol/week: 6.0 standard drinks of alcohol     Types: 6 Cans of beer per week     Comment: daily    Drug use: No     Review of Systems    Physical Exam     Initial Vitals [09/19/23 1116]   BP Pulse Resp Temp SpO2   (!) 102/56 71 18 98.7 °F (37.1 °C) 98 %      MAP       --         Physical Exam    Nursing note and vitals reviewed.  Constitutional: He appears well-developed and well-nourished. No distress.   Neck: Neck supple.   Cardiovascular:  Normal rate, regular rhythm and intact distal pulses.           Pulmonary/Chest: Breath sounds normal. He has no wheezes. He has no rales. He exhibits no tenderness.   Abdominal: Bowel sounds are normal. He exhibits distension. There is abdominal tenderness (Generalized tenderness).   Musculoskeletal:         General: No edema.      Cervical back: Neck supple.     Lymphadenopathy:     He has no cervical adenopathy.   Neurological: He is alert and oriented to person, place, and time.   Skin: Skin is dry. No rash noted.   Psychiatric: He has a normal mood and affect.         ED Course   Procedures  Labs Reviewed   CBC W/ AUTO DIFFERENTIAL - Abnormal; Notable for the following components:       Result Value    RBC 3.00 (*)     Hemoglobin 7.4 (*)     Hematocrit 23.5 (*)     MCV 78 (*)     MCH 24.7 (*)     MCHC 31.5 (*)     RDW 15.9 (*)     MPV 8.1 (*)     Immature Granulocytes 0.6 (*)     Immature Grans (Abs) 0.06 (*)     Lymph # 0.7 (*)     Gran % 78.6 (*)     Lymph % 7.2 (*)     All other components within normal limits   COMPREHENSIVE METABOLIC PANEL - Abnormal; Notable for the following components:    Potassium 3.1 (*)     Glucose 123 (*)      BUN 69 (*)     Creatinine 3.5 (*)     Albumin 2.3 (*)     eGFR 17.8 (*)     All other components within normal limits   TROPONIN I - Abnormal; Notable for the following components:    Troponin I 0.055 (*)     All other components within normal limits   TROPONIN I - Abnormal; Notable for the following components:    Troponin I 0.053 (*)     All other components within normal limits   B-TYPE NATRIURETIC PEPTIDE - Abnormal; Notable for the following components:    BNP 1,100 (*)     All other components within normal limits   TROPONIN I - Abnormal; Notable for the following components:    Troponin I 0.056 (*)     All other components within normal limits   TROPONIN ISTAT   TROPONIN ISTAT   POCT TROPONIN   POCT TROPONIN     EKG Readings: (Independently Interpreted)   EKG:  Sinus rhythm with a first-degree AV block at 68, left axis, left bundle, no ischemic changes       Imaging Results              CT Abdomen Pelvis  Without Contrast (Final result)  Result time 09/19/23 14:05:40      Final result by Jaswant Dove MD (09/19/23 14:05:40)                   Impression:      1. Bilateral pleural effusions and diffuse body wall edema.  Smooth interlobular septal thickening may relate to vascular congestion/mild interstitial edema.  Correlation with patient volume status recommended.  2. Additional details of chronic and incidental findings, as provided in the body of the report.      Electronically signed by: Jaswant Dove  Date:    09/19/2023  Time:    14:05               Narrative:    EXAMINATION:  CT ABDOMEN PELVIS WITHOUT CONTRAST    CLINICAL HISTORY:  Bowel obstruction suspected;    TECHNIQUE:  Low dose axial images, sagittal and coronal reformations were obtained from the lung bases to the pubic symphysis.    COMPARISON:  Retroperitoneal ultrasound 07/19/2023.  CT of the abdomen pelvis performed 07/17/2023.    FINDINGS:  This examination is limited due to lack of intravenous contrast.    Lower chest: Cardiac  enlargement.  Cardiac valvular and coronary artery calcifications.  Relative hypoattenuation of the blood pool may be seen the setting of anemia.  Small to moderate right and small left pleural effusions.  Basilar atelectasis.  Areas of scarring.  Smooth interlobular septal thickening.    Liver: Normal contour.  Suspect calcified granulomas.    Gallbladder and bile ducts: Cholelithiasis and possible biliary sludge versus additional small stones.  No definite focal pericholecystic inflammation.  No new biliary ductal dilatation.    Pancreas: Normal contour.    Spleen: Normal contour.  Suspect calcified granulomas.    Adrenals: Normal contour.    Kidneys: Nonspecific perinephric stranding    Lymph nodes: No abdominal or pelvic lymphadenopathy.    Bowel and mesentery: Small hiatal hernia.  No evidence of bowel obstruction.  Unremarkable appendix.    Abdominal aorta: Nonaneurysmal.  Moderate to heavy atherosclerosis.    Inferior vena cava: Unremarkable.    Free fluid or free air: None.    Pelvis: Unremarkable.    Urinary bladder: Unremarkable.    Body wall: Rectus diastasis.  Mild diffuse body wall edema.    Bones: No definite acute change.  Degenerative findings of the spine appreciated.                                       X-Ray Chest AP Portable (Final result)  Result time 09/19/23 13:38:42      Final result by Adalgisa Jin MD (09/19/23 13:38:42)                   Impression:      Cardiomegaly with possible mild CHF.    Bilateral small pleural effusion and bibasilar subsegmental atelectasis slightly worse on the right.      Electronically signed by: Adalgisa Jin MD  Date:    09/19/2023  Time:    13:38               Narrative:    EXAMINATION:  XR CHEST AP PORTABLE    CLINICAL HISTORY:  Chest pain, unspecified    TECHNIQUE:  Single frontal view of the chest was performed.    COMPARISON:  08/09/2023    FINDINGS:  The cardiac silhouette is enlarged.  The pulmonary vascularity is increased centrally.  No  superimposed focal airspace disease.  Small bilateral pleural effusion right more than left and minimal atelectasis at the lung bases bilaterally as noted on CT 09/19/2023.    No pneumothorax or pleural effusion.                                       Medications - No data to display  Medical Decision Making  Emergent evaluation a 72-year-old male history above presenting today for chest discomfort approximately 1 hour after eating breakfast.  Patient was given nitro and aspirin with EMS, denies improvement.    Vitals reviewed, afebrile, with borderline hypotension.  Saturation 98%    Differential diagnosis includes but not limited to ACS, CHF, obstruction, gastritis, peptic ulcer disease, perforation, pneumonia, pericarditis, myocarditis, esophagitis    Plan for labs, EKG, cardiac monitor, CT abdomen pelvis, chest x-ray    Problems Addressed:  Chest pain: acute illness or injury    Amount and/or Complexity of Data Reviewed  Labs: ordered. Decision-making details documented in ED Course.  Radiology: ordered and independent interpretation performed. Decision-making details documented in ED Course.  ECG/medicine tests: ordered and independent interpretation performed. Decision-making details documented in ED Course.  Discussion of management or test interpretation with external provider(s): [facility]    Risk  Prescription drug management.  Decision regarding hospitalization.               ED Course as of 09/19/23 1714   Tue Sep 19, 2023   1310 Troponin I(!): 0.055  Baseline   [GM]   1310 BNP(!): 1,100  Elevated   [GM]   1310 Potassium(!): 3.1 [GM]   1310 BUN(!): 69 [GM]   1310 Creatinine(!): 3.5  Previous 2.3. [GM]   1326 Chest x-ray reviewed and independently interpreted by me as cardiomegaly, pulmonary edema with possible congestive changes in the right lower lung [GM]   1654 Labs reviewed no leukocytosis.  Baseline hemoglobin which is stable.  CMP with mild hypokalemia and new ELIZABETH.  His troponin is slightly  elevated but at baseline.  Repeat his flat.  BNP is elevated at 1 100, patient treated with Lasix 100 mg IV as I suspect possible cardio renal syndrome.  I have decided to admit patient for further diuresis.  Given capacity constraints, patient offered transfer to [facility]. [GM]      ED Course User Index  [GM] Pippa Alvarado MD                    Clinical Impression:   Final diagnoses:  [R07.9] Chest pain  [I50.9] Decompensated heart failure (Primary)               Pippa Alvarado MD  09/19/23 2008

## 2023-09-19 NOTE — PROVIDER TRANSFER
(Physician in Lead of Transfers)  Outside Transfer Acceptance Note / Regional Referral Center    Upon patient arrival, please contact Hospital Medicine on call.    Referring facility: [facility]   Referring provider: KATIE SWEET  [place] [facility]  Accepting provider: CARLYN JOINER  Admitting provider: ANGELA DIA  Reason for transfer:  Capacity  Transfer diagnosis: CHF exacerbation  Transfer specialty requested: Hospital Medicine  Transfer specialty notified: Yes  Transfer level: NUMBER 1-5: 2  Bed type requested: tele  Isolation status: No active isolations   Admission class or status: IP- Inpatient      Narrative     72-year-old male with a history of HFpEF, coronary artery disease, paroxysmal atrial flutter (on Eliquis), diabetes, hypertension, hyperlipidemia, CKD, and recent COVID (COVID positive July 27 and August 11) presented to the [facility] Emergency Department on September 19 with chest pain that started after eating breakfast.  He noted pressure-like discomfort across his chest not relieved with nitroglycerin.  He also noted abdominal bloating with discomfort. Chest discomfort resolved in the ED. No fever or chills and no cough.  Mild dyspnea.  On exam he had abdominal distention with generalized tenderness.  Lab studies had fairly stable troponin with elevated BNP and low potassium.  He was also noted to have acute on chronic kidney injury.  Imaging studies had evidence of volume overload.  In the emergency department he received oral potassium and IV Lasix.  With bed capacity issues, requesting transfer to Hospital Medicine at [facility] for further treatment of likely CHF exacerbation with acute on chronic kidney injury.     Sodium 137, potassium 3.1, chloride 97, CO2 25, BUN 69, creatinine 3.5 (2.3 on August 15), glucose 123, AST 24, ALT 22, white blood cells 9.75, hemoglobin 7.4, hematocrit 23.5, platelets 293, BNP 1100 (487 on July  27)    POC cardiac troponin 0.05 with repeat 0.03  Lab troponin 0.053 with repeat of 0.055 and 0.056    Chest x-ray had cardiomegaly with possible mild CHF.  Bilateral small pleural effusions and bibasilar segmental atelectasis slightly worse on the right.    CT abdomen and pelvis showed bilateral pleural effusions and diffuse body wall edema.  Smooth interlobular septal thickening may relate to vascular congestion/mild interstitial edema.    EKG showed sinus rhythm with first-degree AV block.  Left bundle-branch block (left bundle-branch block/left intraventricular conduction delay noted on previous EKGs).    Giovana 15: Echocardiogram with EF 60%.  Grade 2 diastolic dysfunction.    Objective     Vitals: Temp: 97.9 °F (36.6 °C) (09/19/23 1814)  Pulse: 63 (09/19/23 1532)  Resp: 18 (09/19/23 1532)  BP: (!) 150/65 (09/19/23 1532)  SpO2: 98 % (09/19/23 1532)  Recent Labs: CBC:   Recent Labs   Lab 09/19/23  1212   WBC 9.75   HGB 7.4*   HCT 23.5*        CMP:   Recent Labs   Lab 09/19/23  1212      K 3.1*   CL 97   CO2 25   *   BUN 69*   CREATININE 3.5*   CALCIUM 9.1   PROT 7.3   ALBUMIN 2.3*   BILITOT 0.8   ALKPHOS 92   AST 24   ALT 22   ANIONGAP 15     Troponin:   Recent Labs   Lab 09/19/23  1212 09/19/23  1508   TROPONINI 0.055*  0.053* 0.056*         Instructions    Admit to Hospital Medicine    ZOEY Martínez MD  Hospital Medicine Staff  Cell: [phone]

## 2023-09-19 NOTE — ED TRIAGE NOTES
Chato Bowie, a 72 y.o. male presents to the ED w/ complaint of chest pain    Pt is c/o midsternal chest pain. Pt states it started after eating breakfast.    Triage note:  Chief Complaint   Patient presents with    Chest Pain     X 1 hour. Given 325 of aspirin and 1 nitro SG.      Review of patient's allergies indicates:  No Known Allergies  Past Medical History:   Diagnosis Date    Acute congestive heart failure 3/20/2020    Alcohol abuse     Arthritis     Asthma attack 11/24/2021    Coronary artery disease     Diabetes mellitus     Hyperlipemia     Hypertension     Multiple thyroid nodules 6/14/2023    Rhabdomyolysis

## 2023-09-19 NOTE — ED NOTES
Patient agreeable to capacity transfer to [facility]. Pt Leigh nogueira, notified by phone per patient request and also okay with transfer.

## 2023-09-20 PROBLEM — E11.65 TYPE 2 DIABETES MELLITUS WITH HYPERGLYCEMIA: Chronic | Status: RESOLVED | Noted: 2020-11-24 | Resolved: 2023-09-20

## 2023-09-20 PROBLEM — E44.1 MILD PROTEIN MALNUTRITION: Chronic | Status: RESOLVED | Noted: 2017-03-10 | Resolved: 2023-09-20

## 2023-09-20 LAB
ABO + RH BLD: NORMAL
ALBUMIN SERPL BCP-MCNC: 2.1 G/DL (ref 3.5–5.2)
ALP SERPL-CCNC: 87 U/L (ref 55–135)
ALT SERPL W/O P-5'-P-CCNC: 20 U/L (ref 10–44)
ANION GAP SERPL CALC-SCNC: 12 MMOL/L (ref 8–16)
ASCENDING AORTA: 3.51 CM
AST SERPL-CCNC: 22 U/L (ref 10–40)
AV INDEX (PROSTH): 0.27
AV MEAN GRADIENT: 44 MMHG
AV PEAK GRADIENT: 71 MMHG
AV REGURGITATION PRESSURE HALF TIME: 348.52 MS
AV VALVE AREA BY VELOCITY RATIO: 0.96 CM²
AV VALVE AREA: 1 CM²
AV VELOCITY RATIO: 0.26
BASOPHILS # BLD AUTO: 0.07 K/UL (ref 0–0.2)
BASOPHILS NFR BLD: 0.8 % (ref 0–1.9)
BILIRUB SERPL-MCNC: 0.7 MG/DL (ref 0.1–1)
BLD GP AB SCN CELLS X3 SERPL QL: NORMAL
BLD PROD TYP BPU: NORMAL
BLOOD UNIT EXPIRATION DATE: NORMAL
BLOOD UNIT TYPE CODE: 7300
BLOOD UNIT TYPE: NORMAL
BSA FOR ECHO PROCEDURE: 2.27 M2
BUN SERPL-MCNC: 76 MG/DL (ref 8–23)
CALCIUM SERPL-MCNC: 9.1 MG/DL (ref 8.7–10.5)
CHLORIDE SERPL-SCNC: 100 MMOL/L (ref 95–110)
CHOLEST SERPL-MCNC: 78 MG/DL (ref 120–199)
CHOLEST/HDLC SERPL: 3.3 {RATIO} (ref 2–5)
CO2 SERPL-SCNC: 25 MMOL/L (ref 23–29)
CODING SYSTEM: NORMAL
CREAT SERPL-MCNC: 3.5 MG/DL (ref 0.5–1.4)
CROSSMATCH INTERPRETATION: NORMAL
CV ECHO LV RWT: 0.44 CM
DIFFERENTIAL METHOD: ABNORMAL
DISPENSE STATUS: NORMAL
DOP CALC AO PEAK VEL: 4.21 M/S
DOP CALC AO VTI: 117.2 CM
DOP CALC LVOT AREA: 3.7 CM2
DOP CALC LVOT DIAMETER: 2.17 CM
DOP CALC LVOT PEAK VEL: 1.09 M/S
DOP CALC LVOT STROKE VOLUME: 117.18 CM3
DOP CALC MV VTI: 41 CM
DOP CALCLVOT PEAK VEL VTI: 31.7 CM
E WAVE DECELERATION TIME: 161.92 MSEC
E/A RATIO: 2.13
E/E' RATIO: 22 M/S
ECHO LV POSTERIOR WALL: 1.15 CM (ref 0.6–1.1)
EOSINOPHIL # BLD AUTO: 0.4 K/UL (ref 0–0.5)
EOSINOPHIL NFR BLD: 4.9 % (ref 0–8)
ERYTHROCYTE [DISTWIDTH] IN BLOOD BY AUTOMATED COUNT: 15.9 % (ref 11.5–14.5)
EST. GFR  (NO RACE VARIABLE): 18 ML/MIN/1.73 M^2
ESTIMATED AVG GLUCOSE: 111 MG/DL (ref 68–131)
FRACTIONAL SHORTENING: 39 % (ref 28–44)
GLUCOSE SERPL-MCNC: 80 MG/DL (ref 70–110)
HBA1C MFR BLD: 5.5 % (ref 4–5.6)
HCT VFR BLD AUTO: 22 % (ref 40–54)
HDLC SERPL-MCNC: 24 MG/DL (ref 40–75)
HDLC SERPL: 30.8 % (ref 20–50)
HGB BLD-MCNC: 6.4 G/DL (ref 14–18)
IMM GRANULOCYTES # BLD AUTO: 0.03 K/UL (ref 0–0.04)
IMM GRANULOCYTES NFR BLD AUTO: 0.4 % (ref 0–0.5)
INTERVENTRICULAR SEPTUM: 1.05 CM (ref 0.6–1.1)
IVC DIAMETER: 2.3 CM
IVRT: 114.18 MSEC
LA MAJOR: 6.5 CM
LA MINOR: 5.74 CM
LA WIDTH: 5.7 CM
LDLC SERPL CALC-MCNC: 43.6 MG/DL (ref 63–159)
LEFT ATRIUM SIZE: 6.24 CM
LEFT ATRIUM VOLUME INDEX: 83 ML/M2
LEFT ATRIUM VOLUME: 184.31 CM3
LEFT INTERNAL DIMENSION IN SYSTOLE: 3.2 CM (ref 2.1–4)
LEFT VENTRICLE DIASTOLIC VOLUME INDEX: 58.86 ML/M2
LEFT VENTRICLE DIASTOLIC VOLUME: 130.68 ML
LEFT VENTRICLE MASS INDEX: 100 G/M2
LEFT VENTRICLE SYSTOLIC VOLUME INDEX: 18.4 ML/M2
LEFT VENTRICLE SYSTOLIC VOLUME: 40.81 ML
LEFT VENTRICULAR INTERNAL DIMENSION IN DIASTOLE: 5.22 CM (ref 3.5–6)
LEFT VENTRICULAR MASS: 222.15 G
LV LATERAL E/E' RATIO: 17.88 M/S
LV SEPTAL E/E' RATIO: 28.6 M/S
LVOT MG: 2.86 MMHG
LVOT MV: 0.8 CM/S
LYMPHOCYTES # BLD AUTO: 0.8 K/UL (ref 1–4.8)
LYMPHOCYTES NFR BLD: 9.8 % (ref 18–48)
MAGNESIUM SERPL-MCNC: 2.2 MG/DL (ref 1.6–2.6)
MCH RBC QN AUTO: 23.8 PG (ref 27–31)
MCHC RBC AUTO-ENTMCNC: 29.1 G/DL (ref 32–36)
MCV RBC AUTO: 82 FL (ref 82–98)
MONOCYTES # BLD AUTO: 0.9 K/UL (ref 0.3–1)
MONOCYTES NFR BLD: 10.3 % (ref 4–15)
MV MEAN GRADIENT: 3 MMHG
MV PEAK A VEL: 0.67 M/S
MV PEAK E VEL: 1.43 M/S
MV PEAK GRADIENT: 8 MMHG
MV STENOSIS PRESSURE HALF TIME: 46.96 MS
MV VALVE AREA BY CONTINUITY EQUATION: 2.86 CM2
MV VALVE AREA P 1/2 METHOD: 4.68 CM2
NEUTROPHILS # BLD AUTO: 6.3 K/UL (ref 1.8–7.7)
NEUTROPHILS NFR BLD: 73.8 % (ref 38–73)
NONHDLC SERPL-MCNC: 54 MG/DL
NRBC BLD-RTO: 0 /100 WBC
PHOSPHATE SERPL-MCNC: 4.5 MG/DL (ref 2.7–4.5)
PISA AR MAX VEL: 4.33 M/S
PISA TR MAX VEL: 4.04 M/S
PLATELET # BLD AUTO: 265 K/UL (ref 150–450)
PMV BLD AUTO: 7.9 FL (ref 9.2–12.9)
POCT GLUCOSE: 111 MG/DL (ref 70–110)
POCT GLUCOSE: 78 MG/DL (ref 70–110)
POCT GLUCOSE: 84 MG/DL (ref 70–110)
POCT GLUCOSE: 86 MG/DL (ref 70–110)
POCT GLUCOSE: 92 MG/DL (ref 70–110)
POTASSIUM SERPL-SCNC: 3.2 MMOL/L (ref 3.5–5.1)
PROT SERPL-MCNC: 6.7 G/DL (ref 6–8.4)
PV PEAK GRADIENT: 8 MMHG
PV PEAK VELOCITY: 1.39 M/S
RA MAJOR: 5.2 CM
RA PRESSURE ESTIMATED: 15 MMHG
RA WIDTH: 3.71 CM
RBC # BLD AUTO: 2.69 M/UL (ref 4.6–6.2)
RIGHT VENTRICULAR END-DIASTOLIC DIMENSION: 4.7 CM
RV TB RVSP: 19 MMHG
SINUS: 3.27 CM
SODIUM SERPL-SCNC: 137 MMOL/L (ref 136–145)
SPECIMEN OUTDATE: NORMAL
STJ: 2.88 CM
TDI LATERAL: 0.08 M/S
TDI SEPTAL: 0.05 M/S
TDI: 0.07 M/S
TR MAX PG: 65 MMHG
TRANS ERYTHROCYTES VOL PATIENT: NORMAL ML
TRICUSPID ANNULAR PLANE SYSTOLIC EXCURSION: 2.56 CM
TRIGL SERPL-MCNC: 52 MG/DL (ref 30–150)
TROPONIN I SERPL DL<=0.01 NG/ML-MCNC: 0.05 NG/ML (ref 0–0.03)
TSH SERPL DL<=0.005 MIU/L-ACNC: 3.22 UIU/ML (ref 0.4–4)
TV PEAK GRADIENT: 1 MMHG
TV REST PULMONARY ARTERY PRESSURE: 80 MMHG
WBC # BLD AUTO: 8.57 K/UL (ref 3.9–12.7)
Z-SCORE OF LEFT VENTRICULAR DIMENSION IN END DIASTOLE: -3.96
Z-SCORE OF LEFT VENTRICULAR DIMENSION IN END SYSTOLE: -3.05

## 2023-09-20 PROCEDURE — 84443 ASSAY THYROID STIM HORMONE: CPT | Performed by: NURSE PRACTITIONER

## 2023-09-20 PROCEDURE — P9021 RED BLOOD CELLS UNIT: HCPCS | Performed by: STUDENT IN AN ORGANIZED HEALTH CARE EDUCATION/TRAINING PROGRAM

## 2023-09-20 PROCEDURE — 99215 OFFICE O/P EST HI 40 MIN: CPT | Mod: ,,, | Performed by: NURSE PRACTITIONER

## 2023-09-20 PROCEDURE — 96376 TX/PRO/DX INJ SAME DRUG ADON: CPT

## 2023-09-20 PROCEDURE — 36415 COLL VENOUS BLD VENIPUNCTURE: CPT | Performed by: STUDENT IN AN ORGANIZED HEALTH CARE EDUCATION/TRAINING PROGRAM

## 2023-09-20 PROCEDURE — 99215 PR OFFICE/OUTPT VISIT, EST, LEVL V, 40-54 MIN: ICD-10-PCS | Mod: ,,, | Performed by: NURSE PRACTITIONER

## 2023-09-20 PROCEDURE — 63600175 PHARM REV CODE 636 W HCPCS: Performed by: STUDENT IN AN ORGANIZED HEALTH CARE EDUCATION/TRAINING PROGRAM

## 2023-09-20 PROCEDURE — 99900035 HC TECH TIME PER 15 MIN (STAT)

## 2023-09-20 PROCEDURE — 36415 COLL VENOUS BLD VENIPUNCTURE: CPT | Performed by: NURSE PRACTITIONER

## 2023-09-20 PROCEDURE — 86850 RBC ANTIBODY SCREEN: CPT | Performed by: STUDENT IN AN ORGANIZED HEALTH CARE EDUCATION/TRAINING PROGRAM

## 2023-09-20 PROCEDURE — 96372 THER/PROPH/DIAG INJ SC/IM: CPT | Performed by: STUDENT IN AN ORGANIZED HEALTH CARE EDUCATION/TRAINING PROGRAM

## 2023-09-20 PROCEDURE — G0378 HOSPITAL OBSERVATION PER HR: HCPCS

## 2023-09-20 PROCEDURE — 80053 COMPREHEN METABOLIC PANEL: CPT | Performed by: NURSE PRACTITIONER

## 2023-09-20 PROCEDURE — 36430 TRANSFUSION BLD/BLD COMPNT: CPT

## 2023-09-20 PROCEDURE — 25000003 PHARM REV CODE 250: Performed by: STUDENT IN AN ORGANIZED HEALTH CARE EDUCATION/TRAINING PROGRAM

## 2023-09-20 PROCEDURE — 86920 COMPATIBILITY TEST SPIN: CPT | Performed by: STUDENT IN AN ORGANIZED HEALTH CARE EDUCATION/TRAINING PROGRAM

## 2023-09-20 PROCEDURE — 25000003 PHARM REV CODE 250: Performed by: NURSE PRACTITIONER

## 2023-09-20 PROCEDURE — 84484 ASSAY OF TROPONIN QUANT: CPT | Performed by: NURSE PRACTITIONER

## 2023-09-20 PROCEDURE — 85025 COMPLETE CBC W/AUTO DIFF WBC: CPT | Performed by: NURSE PRACTITIONER

## 2023-09-20 PROCEDURE — 83036 HEMOGLOBIN GLYCOSYLATED A1C: CPT | Performed by: NURSE PRACTITIONER

## 2023-09-20 PROCEDURE — 83735 ASSAY OF MAGNESIUM: CPT | Performed by: NURSE PRACTITIONER

## 2023-09-20 PROCEDURE — 84100 ASSAY OF PHOSPHORUS: CPT | Performed by: NURSE PRACTITIONER

## 2023-09-20 PROCEDURE — 94761 N-INVAS EAR/PLS OXIMETRY MLT: CPT

## 2023-09-20 PROCEDURE — 80061 LIPID PANEL: CPT | Performed by: NURSE PRACTITIONER

## 2023-09-20 RX ORDER — ATORVASTATIN CALCIUM 40 MG/1
80 TABLET, FILM COATED ORAL DAILY
Status: DISCONTINUED | OUTPATIENT
Start: 2023-09-20 | End: 2023-09-23 | Stop reason: HOSPADM

## 2023-09-20 RX ORDER — AMLODIPINE BESYLATE 5 MG/1
10 TABLET ORAL DAILY
Status: DISCONTINUED | OUTPATIENT
Start: 2023-09-20 | End: 2023-09-20

## 2023-09-20 RX ORDER — TALC
6 POWDER (GRAM) TOPICAL NIGHTLY PRN
Status: DISCONTINUED | OUTPATIENT
Start: 2023-09-20 | End: 2023-09-23 | Stop reason: HOSPADM

## 2023-09-20 RX ORDER — POTASSIUM CHLORIDE 750 MG/1
10 TABLET, EXTENDED RELEASE ORAL DAILY
Status: DISCONTINUED | OUTPATIENT
Start: 2023-09-20 | End: 2023-09-21

## 2023-09-20 RX ORDER — AMIODARONE HYDROCHLORIDE 200 MG/1
200 TABLET ORAL DAILY
Status: DISCONTINUED | OUTPATIENT
Start: 2023-09-20 | End: 2023-09-23 | Stop reason: HOSPADM

## 2023-09-20 RX ORDER — FUROSEMIDE 10 MG/ML
40 INJECTION INTRAMUSCULAR; INTRAVENOUS EVERY 8 HOURS
Status: DISCONTINUED | OUTPATIENT
Start: 2023-09-20 | End: 2023-09-23 | Stop reason: HOSPADM

## 2023-09-20 RX ORDER — METOPROLOL SUCCINATE 25 MG/1
25 TABLET, EXTENDED RELEASE ORAL DAILY
Status: DISCONTINUED | OUTPATIENT
Start: 2023-09-20 | End: 2023-09-23 | Stop reason: HOSPADM

## 2023-09-20 RX ORDER — IBUPROFEN 200 MG
24 TABLET ORAL
Status: DISCONTINUED | OUTPATIENT
Start: 2023-09-20 | End: 2023-09-23 | Stop reason: HOSPADM

## 2023-09-20 RX ORDER — GABAPENTIN 100 MG/1
100 CAPSULE ORAL 2 TIMES DAILY
Status: DISCONTINUED | OUTPATIENT
Start: 2023-09-20 | End: 2023-09-23 | Stop reason: HOSPADM

## 2023-09-20 RX ORDER — INSULIN ASPART 100 [IU]/ML
0-5 INJECTION, SOLUTION INTRAVENOUS; SUBCUTANEOUS
Status: DISCONTINUED | OUTPATIENT
Start: 2023-09-20 | End: 2023-09-23 | Stop reason: HOSPADM

## 2023-09-20 RX ORDER — ALLOPURINOL 100 MG/1
100 TABLET ORAL DAILY
Status: DISCONTINUED | OUTPATIENT
Start: 2023-09-20 | End: 2023-09-23 | Stop reason: HOSPADM

## 2023-09-20 RX ORDER — IBUPROFEN 200 MG
16 TABLET ORAL
Status: DISCONTINUED | OUTPATIENT
Start: 2023-09-20 | End: 2023-09-23 | Stop reason: HOSPADM

## 2023-09-20 RX ORDER — FUROSEMIDE 10 MG/ML
80 INJECTION INTRAMUSCULAR; INTRAVENOUS
Status: DISCONTINUED | OUTPATIENT
Start: 2023-09-20 | End: 2023-09-20

## 2023-09-20 RX ORDER — NALOXONE HCL 0.4 MG/ML
0.02 VIAL (ML) INJECTION
Status: DISCONTINUED | OUTPATIENT
Start: 2023-09-20 | End: 2023-09-23 | Stop reason: HOSPADM

## 2023-09-20 RX ORDER — MAG HYDROX/ALUMINUM HYD/SIMETH 200-200-20
30 SUSPENSION, ORAL (FINAL DOSE FORM) ORAL EVERY 6 HOURS PRN
Status: DISCONTINUED | OUTPATIENT
Start: 2023-09-20 | End: 2023-09-23 | Stop reason: HOSPADM

## 2023-09-20 RX ORDER — ALBUTEROL SULFATE 90 UG/1
2 AEROSOL, METERED RESPIRATORY (INHALATION) EVERY 6 HOURS PRN
Status: DISCONTINUED | OUTPATIENT
Start: 2023-09-20 | End: 2023-09-20 | Stop reason: CLARIF

## 2023-09-20 RX ORDER — GLUCAGON 1 MG
1 KIT INJECTION
Status: DISCONTINUED | OUTPATIENT
Start: 2023-09-20 | End: 2023-09-23 | Stop reason: HOSPADM

## 2023-09-20 RX ORDER — ALBUTEROL SULFATE 2.5 MG/.5ML
2.5 SOLUTION RESPIRATORY (INHALATION) EVERY 6 HOURS PRN
Status: DISCONTINUED | OUTPATIENT
Start: 2023-09-20 | End: 2023-09-23 | Stop reason: HOSPADM

## 2023-09-20 RX ORDER — ONDANSETRON 2 MG/ML
4 INJECTION INTRAMUSCULAR; INTRAVENOUS EVERY 6 HOURS PRN
Status: DISCONTINUED | OUTPATIENT
Start: 2023-09-20 | End: 2023-09-23 | Stop reason: HOSPADM

## 2023-09-20 RX ORDER — LANOLIN ALCOHOL/MO/W.PET/CERES
1 CREAM (GRAM) TOPICAL DAILY
Status: DISCONTINUED | OUTPATIENT
Start: 2023-09-20 | End: 2023-09-23 | Stop reason: HOSPADM

## 2023-09-20 RX ORDER — ACETAMINOPHEN 325 MG/1
650 TABLET ORAL EVERY 6 HOURS PRN
Status: DISCONTINUED | OUTPATIENT
Start: 2023-09-20 | End: 2023-09-23 | Stop reason: HOSPADM

## 2023-09-20 RX ORDER — LIDOCAINE 50 MG/G
1 PATCH TOPICAL DAILY
Status: DISCONTINUED | OUTPATIENT
Start: 2023-09-20 | End: 2023-09-23 | Stop reason: HOSPADM

## 2023-09-20 RX ORDER — FOLIC ACID 1 MG/1
1 TABLET ORAL DAILY
Status: DISCONTINUED | OUTPATIENT
Start: 2023-09-20 | End: 2023-09-23 | Stop reason: HOSPADM

## 2023-09-20 RX ORDER — SODIUM CHLORIDE 0.9 % (FLUSH) 0.9 %
10 SYRINGE (ML) INJECTION EVERY 12 HOURS PRN
Status: DISCONTINUED | OUTPATIENT
Start: 2023-09-20 | End: 2023-09-23 | Stop reason: HOSPADM

## 2023-09-20 RX ORDER — HYDRALAZINE HYDROCHLORIDE 25 MG/1
100 TABLET, FILM COATED ORAL EVERY 8 HOURS
Status: DISCONTINUED | OUTPATIENT
Start: 2023-09-20 | End: 2023-09-20

## 2023-09-20 RX ORDER — HYDROCODONE BITARTRATE AND ACETAMINOPHEN 500; 5 MG/1; MG/1
TABLET ORAL
Status: DISCONTINUED | OUTPATIENT
Start: 2023-09-20 | End: 2023-09-23 | Stop reason: HOSPADM

## 2023-09-20 RX ORDER — HYDROCODONE BITARTRATE AND ACETAMINOPHEN 5; 325 MG/1; MG/1
1 TABLET ORAL EVERY 8 HOURS PRN
Status: DISCONTINUED | OUTPATIENT
Start: 2023-09-20 | End: 2023-09-23 | Stop reason: HOSPADM

## 2023-09-20 RX ADMIN — ALLOPURINOL 100 MG: 100 TABLET ORAL at 10:09

## 2023-09-20 RX ADMIN — FOLIC ACID 1 MG: 1 TABLET ORAL at 10:09

## 2023-09-20 RX ADMIN — Medication 6 MG: at 01:09

## 2023-09-20 RX ADMIN — ATORVASTATIN CALCIUM 80 MG: 40 TABLET, FILM COATED ORAL at 10:09

## 2023-09-20 RX ADMIN — FERROUS SULFATE TAB 325 MG (65 MG ELEMENTAL FE) 1 EACH: 325 (65 FE) TAB at 10:09

## 2023-09-20 RX ADMIN — FUROSEMIDE 40 MG: 10 INJECTION, SOLUTION INTRAVENOUS at 09:09

## 2023-09-20 RX ADMIN — ACETAMINOPHEN 650 MG: 325 TABLET ORAL at 01:09

## 2023-09-20 RX ADMIN — FUROSEMIDE 80 MG: 10 INJECTION, SOLUTION INTRAVENOUS at 06:09

## 2023-09-20 RX ADMIN — THERA TABS 1 TABLET: TAB at 10:09

## 2023-09-20 RX ADMIN — HYDRALAZINE HYDROCHLORIDE 100 MG: 25 TABLET, FILM COATED ORAL at 06:09

## 2023-09-20 RX ADMIN — AMIODARONE HYDROCHLORIDE 200 MG: 200 TABLET ORAL at 10:09

## 2023-09-20 RX ADMIN — INSULIN DETEMIR 5 UNITS: 100 INJECTION, SOLUTION SUBCUTANEOUS at 09:09

## 2023-09-20 RX ADMIN — POTASSIUM CHLORIDE 10 MEQ: 750 TABLET, EXTENDED RELEASE ORAL at 10:09

## 2023-09-20 RX ADMIN — APIXABAN 5 MG: 5 TABLET, FILM COATED ORAL at 10:09

## 2023-09-20 RX ADMIN — GABAPENTIN 100 MG: 100 CAPSULE ORAL at 09:09

## 2023-09-20 RX ADMIN — METOPROLOL SUCCINATE 25 MG: 25 TABLET, EXTENDED RELEASE ORAL at 10:09

## 2023-09-20 RX ADMIN — LIDOCAINE 5% 1 PATCH: 700 PATCH TOPICAL at 10:09

## 2023-09-20 RX ADMIN — GABAPENTIN 100 MG: 100 CAPSULE ORAL at 10:09

## 2023-09-20 NOTE — ASSESSMENT & PLAN NOTE
History of paroxysmal atrial flutter  Not in Flutter at this time   Continue apixaban 5 mg b.i.d., continue amiodarone 200 mg daily.

## 2023-09-20 NOTE — ASSESSMENT & PLAN NOTE
History of BPH  He has not been taking his tamsulosin  We will reconsider restarting tamsulosin with urinary retention,/as needed.  Denied any urinary symptoms at this time, he has a condom catheter in place to help with strict urinary output measurements.

## 2023-09-20 NOTE — PLAN OF CARE
Referral sent via [facility] to [facility] to advise that pt will be ready for d/c back to facility on today and that pt is in observation not inpatient.

## 2023-09-20 NOTE — ASSESSMENT & PLAN NOTE
He presented with serum potassium of 3.1   Likely complicating diuresis  Currently on home dose of furosemide 80 mg and metolazone   We will replete serum potassium continue to monitor with bmp.

## 2023-09-20 NOTE — PLAN OF CARE
Problem: Diabetes Comorbidity  Goal: Blood Glucose Level Within Targeted Range  Outcome: Ongoing, Progressing     Problem: Adjustment to Illness (Sepsis/Septic Shock)  Goal: Optimal Coping  Outcome: Ongoing, Progressing     Problem: Bleeding (Sepsis/Septic Shock)  Goal: Absence of Bleeding  Outcome: Ongoing, Progressing     Problem: Glycemic Control Impaired (Sepsis/Septic Shock)  Goal: Blood Glucose Level Within Desired Range  Outcome: Ongoing, Progressing     Problem: Infection Progression (Sepsis/Septic Shock)  Goal: Absence of Infection Signs and Symptoms  Outcome: Ongoing, Progressing     Problem: Impaired Wound Healing  Goal: Optimal Wound Healing  Outcome: Ongoing, Progressing     Problem: Skin Injury Risk Increased  Goal: Skin Health and Integrity  Outcome: Ongoing, Progressing     Problem: Renal Function Impairment (Acute Kidney Injury/Impairment)  Goal: Effective Renal Function  Outcome: Ongoing, Progressing

## 2023-09-20 NOTE — H&P
[facility] Medicine  History & Physical    Patient Name: Chato Bowie  MRN: [id number]  Patient Class: OP- Observation  Admission Date: 9/19/2023  Attending Physician: Jose L Evans III, MD   Primary Care Provider: Irlanda Valenzuela -         Patient information was obtained from patient, past medical records and ER records.     Subjective:     Principal Problem:Chest pain    Chief Complaint:   Chief Complaint   Patient presents with    Chest Pain     X 1 hour. Given 325 of aspirin and 1 nitro SG.         HPI: 72-year-old  male with medical history significant for congestive heart failure, coronary artery disease, type 2 diabetes mellitus, chronic kidney disease stage 4, hypertension, hyperlipidemia, multiple thyroid nodule and alcohol use disorder in remission was transferred from [facility] ED after presenting with chest pain of 1 hour duration, chest pain was said to be insidious in onset, non-exertional, as patient just finished breakfast and was watching tv when he noticed chest pain across his chest, described as stabbing/someone pounding on my chest, chest pain was nonradiating, said to be 10/10 at onset now 9/10, denied diaphoresis, no nausea, no vomiting although associated with palpitation.  He denied any cough, no change in baseline orthopnea of 2 pillows, no paroxysmal nocturnal dyspnea, although has 2+ of bilateral pitting pedal edema he denied fever, chills, rigors, denied urinary symptoms of dysuria, frequency, urgency, straining at micturition or hematuria.  No change in bowel habits, denied diarrhea, and constipation.  No previous history of tobacco use, voiced quitting alcohol sometimes back.  At presention to the outside hospital lab was significant for hypokalemia of 3.1, BUN creatinine of 69/3.5, with a baseline of 2.1-2.5, H&H 7.4/23.5.  Troponin was 0.053-0.055-0.056 essentially flat similar to previous, be in [facility] inpatient in the 1100, most  "recent prior to this was 487 in July 27, 2023.  CT abdomen and pelvis without contrast showed "bilateral pleural effusion diffuse body wall edema, smooth interlobular septal thickening which may relate to vascular congestion/mild interstitial edema".Pain was mildly improved with aspirin and nitro SG.Had similar chest pain in the past as well.      Past Medical History:   Diagnosis Date    Acute congestive heart failure 3/20/2020    Alcohol abuse     Arthritis     Asthma attack 11/24/2021    Coronary artery disease     Diabetes mellitus     Hyperlipemia     Hypertension     Multiple thyroid nodules 6/14/2023    Rhabdomyolysis        Past Surgical History:   Procedure Laterality Date    EYE SURGERY      TRANSESOPHAGEAL ECHOCARDIOGRAM WITH POSSIBLE CARDIOVERSION (MARIA W/ POSS CARDIOVERSION) N/A 1/5/2023    Procedure: Transesophageal echo (MARIA) intra-procedure log documentation;  Surgeon: Indra Foote MD;  Location: [facility] CATH LAB;  Service: Cardiology;  Laterality: N/A;    TREATMENT OF CARDIAC ARRHYTHMIA N/A 12/7/2020    Procedure: Cardioversion or Defibrillation;  Surgeon: Indra Foote MD;  Location: [facility] CATH LAB;  Service: Cardiology;  Laterality: N/A;    TREATMENT OF CARDIAC ARRHYTHMIA N/A 12/30/2020    Procedure: Cardioversion or Defibrillation;  Surgeon: Tyrone Steen MD;  Location: [facility] CATH LAB;  Service: Cardiology;  Laterality: N/A;    TREATMENT OF CARDIAC ARRHYTHMIA N/A 1/5/2023    Procedure: Cardioversion or Defibrillation;  Surgeon: Indra Foote MD;  Location: [facility] CATH LAB;  Service: Cardiology;  Laterality: N/A;    VASCULAR SURGERY         Review of patient's allergies indicates:  No Known Allergies    No current facility-administered medications on file prior to encounter.     Current Outpatient Medications on File Prior to Encounter   Medication Sig    albuterol (PROVENTIL/VENTOLIN HFA) 90 mcg/actuation inhaler Inhale 2 puffs into the lungs every 6 (six) hours as " needed for Wheezing or Shortness of Breath.    allopurinoL (ZYLOPRIM) 100 MG tablet Take 1 tablet (100 mg total) by mouth once daily.    amiodarone (PACERONE) 200 MG Tab Take 200 mg by mouth once daily.    amLODIPine (NORVASC) 10 MG tablet Take 1 tablet (10 mg total) by mouth once daily.    ascorbic acid, vitamin C, (VITAMIN C) 500 MG tablet Take 500 mg by mouth 2 (two) times daily.    benzonatate (TESSALON) 100 MG capsule Take 100 mg by mouth 3 (three) times daily as needed for Cough.    ELIQUIS 5 mg Tab Take 5 mg by mouth 2 (two) times daily.    ferrous sulfate (FEOSOL) 325 mg (65 mg iron) Tab tablet Take 325 mg by mouth once daily.    folic acid (FOLVITE) 1 MG tablet Take 1 tablet (1 mg total) by mouth once daily.    furosemide (LASIX) 80 MG tablet Take 1 tablet (80 mg total) by mouth 2 (two) times daily.    gabapentin (NEURONTIN) 100 MG capsule Take 1 capsule (100 mg total) by mouth 2 (two) times daily.    hydrALAZINE (APRESOLINE) 100 MG tablet Take 1 tablet (100 mg total) by mouth every 8 (eight) hours.    HYDROcodone-acetaminophen (NORCO) 5-325 mg per tablet Take 1 tablet by mouth every 8 (eight) hours as needed for Pain.    insulin detemir U-100, Levemir, 100 unit/mL (3 mL) SubQ InPn pen Inject 7 Units into the skin 2 (two) times daily.    LIDOcaine (LIDODERM) 5 % Place 2 patches onto the skin once daily. Remove & Discard patch within 12 hours or as directed by MD    metoprolol succinate (TOPROL-XL) 25 MG 24 hr tablet Take 25 mg by mouth once daily.    multivitamin (ONE DAILY MULTIVITAMIN) per tablet Take 1 tablet by mouth once daily.    potassium chloride (KLOR-CON) 10 MEQ TbSR Take 10 mEq by mouth once daily.    rosuvastatin (CRESTOR) 40 MG Tab Take 40 mg by mouth every evening.    tamsulosin (FLOMAX) 0.4 mg Cap Take 1 capsule (0.4 mg total) by mouth once daily.     Family History       Problem Relation (Age of Onset)    Diabetes Mother, Father, Sister    Hypertension Mother, Father,  Sister          Tobacco Use    Smoking status: Never    Smokeless tobacco: Never   Substance and Sexual Activity    Alcohol use: Not Currently     Alcohol/week: 6.0 standard drinks of alcohol     Types: 6 Cans of beer per week     Comment: daily    Drug use: No    Sexual activity: Yes     Partners: Female     Review of Systems   Constitutional:  Negative for appetite change, chills, diaphoresis and fatigue.   HENT:  Negative for congestion, sore throat and tinnitus.    Eyes:  Negative for pain, discharge and itching.   Respiratory:  Positive for shortness of breath. Negative for cough, chest tightness and wheezing.    Cardiovascular:  Positive for chest pain, palpitations and leg swelling.   Gastrointestinal:  Negative for abdominal distention, abdominal pain, blood in stool, nausea and vomiting.   Endocrine: Negative for cold intolerance, heat intolerance and polydipsia.   Genitourinary:  Negative for difficulty urinating, dysuria, flank pain, frequency and hematuria.   Musculoskeletal:  Negative for arthralgias, back pain and neck stiffness.   Skin:  Positive for wound (back wound and right foot).   Neurological:  Positive for weakness. Negative for dizziness, tremors, seizures, facial asymmetry, light-headedness, numbness and headaches.   Psychiatric/Behavioral:  Negative for agitation, confusion and hallucinations.      Objective:     Vital Signs (Most Recent):  Temp: 97.8 °F (36.6 °C) (09/20/23 0108)  Pulse: 61 (09/20/23 0108)  Resp: 20 (09/20/23 0108)  BP: 130/63 (09/20/23 0108)  SpO2: 99 % (09/20/23 0108) Vital Signs (24h Range):  Temp:  [97.8 °F (36.6 °C)-98.7 °F (37.1 °C)] 97.8 °F (36.6 °C)  Pulse:  [60-71] 61  Resp:  [15-20] 20  SpO2:  [95 %-99 %] 99 %  BP: (102-158)/(56-70) 130/63     Weight: 102.7 kg (226 lb 6.6 oz)  Body mass index is 31.58 kg/m².     Physical Exam  Constitutional:       General: He is not in acute distress.     Appearance: Normal appearance. He is obese. He is ill-appearing  (chronically ill looking).   HENT:      Head: Normocephalic and atraumatic.      Nose: Nose normal. No congestion or rhinorrhea.      Mouth/Throat:      Mouth: Mucous membranes are moist.   Eyes:      Extraocular Movements: Extraocular movements intact.      Conjunctiva/sclera: Conjunctivae normal.      Pupils: Pupils are equal, round, and reactive to light.   Cardiovascular:      Rate and Rhythm: Normal rate. Rhythm irregular.      Pulses: Normal pulses.      Heart sounds: Murmur (pansystolic murmur) heard.      Gallop (elevated jvd) present.   Pulmonary:      Effort: Pulmonary effort is normal. No respiratory distress.      Breath sounds: Rales (bibasal crackles) present. No wheezing.   Chest:      Chest wall: Tenderness present.   Abdominal:      General: Abdomen is flat. Bowel sounds are normal. There is no distension.      Palpations: Abdomen is soft.      Tenderness: There is no abdominal tenderness. There is no right CVA tenderness, left CVA tenderness, guarding or rebound.   Musculoskeletal:         General: Normal range of motion.      Cervical back: Normal range of motion and neck supple.      Right lower leg: Edema (bilateral +2 edema) present.      Left lower leg: Edema present.   Skin:     General: Skin is warm and dry.      Coloration: Skin is jaundiced.      Findings: Lesion (sacral decubitus ulcer) present.   Neurological:      General: No focal deficit present.      Mental Status: He is alert and oriented to person, place, and time. Mental status is at baseline.      Cranial Nerves: No cranial nerve deficit.      Sensory: No sensory deficit.      Motor: Weakness present.   Psychiatric:         Mood and Affect: Mood normal.         Behavior: Behavior normal.         Thought Content: Thought content normal.         Judgment: Judgment normal.              CRANIAL NERVES     CN III, IV, VI   Pupils are equal, round, and reactive to light.       Significant Labs: All pertinent labs within the past 24  "hours have been reviewed.  A1C:   Recent Labs   Lab 06/02/23  1859   HGBA1C 7.8*     Blood Culture: No results for input(s): "LABBLOO" in the last 48 hours.  BMP:   Recent Labs   Lab 09/19/23  1212 09/20/23  0446   * 80    137   K 3.1* 3.2*   CL 97 100   CO2 25 25   BUN 69* 76*   CREATININE 3.5*  --    CALCIUM 9.1 9.1   MG  --  2.2     CBC:   Recent Labs   Lab 09/19/23  1212 09/20/23  0446   WBC 9.75 8.57   HGB 7.4* 6.4*   HCT 23.5* 22.0*    265     CMP:   Recent Labs   Lab 09/19/23  1212 09/20/23  0446    137   K 3.1* 3.2*   CL 97 100   CO2 25 25   * 80   BUN 69* 76*   CREATININE 3.5*  --    CALCIUM 9.1 9.1   PROT 7.3 6.7   ALBUMIN 2.3* 2.1*   BILITOT 0.8 0.7   ALKPHOS 92 87   AST 24 22   ALT 22 20   ANIONGAP 15 12     Cardiac Markers:   Recent Labs   Lab 09/19/23  1212   BNP 1,100*     Lactic Acid: No results for input(s): "LACTATE" in the last 48 hours.  Lipase: No results for input(s): "LIPASE" in the last 48 hours.  Prealbumin: No results for input(s): "PREALBUMIN" in the last 48 hours.  Troponin:   Recent Labs   Lab 09/19/23  1212 09/19/23  1508   TROPONINI 0.055*  0.053* 0.056*     TSH:   Recent Labs   Lab 07/27/23  1756   TSH 2.597       Significant Imaging: I have reviewed all pertinent imaging results/findings within the past 24 hours..  Imaging Results              CT Abdomen Pelvis  Without Contrast (Final result)  Result time 09/19/23 14:05:40      Final result by Jaswant Dove MD (09/19/23 14:05:40)                   Impression:      1. Bilateral pleural effusions and diffuse body wall edema.  Smooth interlobular septal thickening may relate to vascular congestion/mild interstitial edema.  Correlation with patient volume status recommended.  2. Additional details of chronic and incidental findings, as provided in the body of the report.      Electronically signed by: Jaswant Dove  Date:    09/19/2023  Time:    14:05               Narrative:    EXAMINATION:  CT " ABDOMEN PELVIS WITHOUT CONTRAST    CLINICAL HISTORY:  Bowel obstruction suspected;    TECHNIQUE:  Low dose axial images, sagittal and coronal reformations were obtained from the lung bases to the pubic symphysis.    COMPARISON:  Retroperitoneal ultrasound 07/19/2023.  CT of the abdomen pelvis performed 07/17/2023.    FINDINGS:  This examination is limited due to lack of intravenous contrast.    Lower chest: Cardiac enlargement.  Cardiac valvular and coronary artery calcifications.  Relative hypoattenuation of the blood pool may be seen the setting of anemia.  Small to moderate right and small left pleural effusions.  Basilar atelectasis.  Areas of scarring.  Smooth interlobular septal thickening.    Liver: Normal contour.  Suspect calcified granulomas.    Gallbladder and bile ducts: Cholelithiasis and possible biliary sludge versus additional small stones.  No definite focal pericholecystic inflammation.  No new biliary ductal dilatation.    Pancreas: Normal contour.    Spleen: Normal contour.  Suspect calcified granulomas.    Adrenals: Normal contour.    Kidneys: Nonspecific perinephric stranding    Lymph nodes: No abdominal or pelvic lymphadenopathy.    Bowel and mesentery: Small hiatal hernia.  No evidence of bowel obstruction.  Unremarkable appendix.    Abdominal aorta: Nonaneurysmal.  Moderate to heavy atherosclerosis.    Inferior vena cava: Unremarkable.    Free fluid or free air: None.    Pelvis: Unremarkable.    Urinary bladder: Unremarkable.    Body wall: Rectus diastasis.  Mild diffuse body wall edema.    Bones: No definite acute change.  Degenerative findings of the spine appreciated.                                       X-Ray Chest AP Portable (Final result)  Result time 09/19/23 13:38:42      Final result by Adalgisa Jin MD (09/19/23 13:38:42)                   Impression:      Cardiomegaly with possible mild CHF.    Bilateral small pleural effusion and bibasilar subsegmental atelectasis  slightly worse on the right.      Electronically signed by: Adalgisa Jin MD  Date:    09/19/2023  Time:    13:38               Narrative:    EXAMINATION:  XR CHEST AP PORTABLE    CLINICAL HISTORY:  Chest pain, unspecified    TECHNIQUE:  Single frontal view of the chest was performed.    COMPARISON:  08/09/2023    FINDINGS:  The cardiac silhouette is enlarged.  The pulmonary vascularity is increased centrally.  No superimposed focal airspace disease.  Small bilateral pleural effusion right more than left and minimal atelectasis at the lung bases bilaterally as noted on CT 09/19/2023.    No pneumothorax or pleural effusion.                                       Assessment/Plan:     * Chest pain  He presented with chest pain  Said to be insidious in onset, nonradiating, although at previous chest pain  Chest pain is atypical in presentation, due to patient being high-risk  Diabetic, hypertensive, coronary artery disease, hyperlipidemia  Chest we will need workup and risk stratification  Troponin x3 were almost not 0.053-0.055-0.056  We will continue medical management at this time while awaiting Cardiology consult  Continue aspirin, atorvastatin, apixaban.      Multiple wounds  Noticed to have decubitus ulcers  With sacral 2 x 1 cm  Dressed, consult wound care at this time.  Frequent turning      acute on chronic diastolic heart failure  History of chronic diastolic heart failure  We will repeat 2D echo at this time   Continue diuresis   Strict input and output  Low-salt and cardiac diet.    CKD (chronic kidney disease), stage IV  History of CKD stage 5   Baseline creatinine at 2.1-2.5, presented with creatinine of 3.5  This is likely ELIZABETH on CKD stage 4  In view of worsening decompensated heart failure this may be cardiorenal syndrome   Other evidence GI loss/acute blood loss  No NSAID use or antibiotics  Expected improvement with diuresis and better cardiac function.      Acute on chronic diastolic congestive  heart failure  Patient is identified as having Diastolic (HFpEF) heart failure that is Acute on chronic. CHF is currently uncontrolled due to Continued edema of extremities.   Latest ECHO performed and demonstrates- Results for orders placed during the hospital encounter of 06/14/23    Echo    Interpretation Summary  · The left ventricle is normal in size with concentric hypertrophy and normal systolic function.  · The estimated ejection fraction is 60%.  · Grade II left ventricular diastolic dysfunction.  · Normal right ventricular size with normal right ventricular systolic function.  · Moderate left atrial enlargement.  · There is moderate aortic valve stenosis.  · Aortic valve area is 1.43 cm2; peak velocity is 3.94 m/s; mean gradient is 31 mmHg.  · Mild-to-moderate aortic regurgitation.  · Mild mitral regurgitation.  · Normal central venous pressure (3 mmHg).  · The estimated PA systolic pressure is 39 mmHg.  . Continue Furosemide and monitor clinical status closely. Monitor on telemetry. Patient is off CHF pathway.  Monitor strict Is&Os and daily weights.  Place on fluid restriction of 1.5 L. Cardiology has not been any consulted. Continue to stress to patient importance of self efficacy and  on diet for CHF. Last BNP reviewed- and noted below   Recent Labs   Lab 09/19/23  1212   BNP 1,100*   .    Paroxysmal atrial flutter  History of paroxysmal atrial flutter  Not in Flutter at this time   Continue apixaban 5 mg b.i.d., continue amiodarone 200 mg daily.      BPH (benign prostatic hyperplasia)  History of BPH  He has not been taking his tamsulosin  We will reconsider restarting tamsulosin with urinary retention,/as needed.  Denied any urinary symptoms at this time, he has a condom catheter in place to help with strict urinary output measurements.      Alcohol abuse  Alcohol abuse likely in remission  Patient voiced not drinking alcohol in a while   Prior documented alcohol abstinence, not at risk of  "alcohol withdrawal at this time  For safety reasons we will place patient on CIWA protocol, but not on medication at this time  Current electrolyte abnormality, thiamine, folic acid and multivitamin daily.      Hypokalemia  He presented with serum potassium of 3.1   Likely complicating diuresis  Currently on home dose of furosemide 80 mg and metolazone   We will replete serum potassium continue to monitor with bmp.      Anemia  likely anemia of chronic kidney disease  Currently has CKD stage 4, previous panel showed iron of 17, TIBC 207, saturation of 8, transferrin 140, ferritin 714  To continue supplementation.  No evidence of acute blood loss  Continue to correct iron deficiency.      Type 2 diabetes mellitus with kidney complication, with long-term current use of insulin  Patient's FSGs are uncontrolled due to hyperglycemia on current medication regimen.  Last A1c reviewed-   Lab Results   Component Value Date    HGBA1C 7.8 (H) 06/02/2023     Most recent fingerstick glucose reviewed- No results for input(s): "POCTGLUCOSE" in the last 24 hours.  Current correctional scale  Medium  Maintain anti-hyperglycemic dose as follows-   Antihyperglycemics (From admission, onward)    Start     Stop Route Frequency Ordered    09/20/23 0900  insulin detemir U-100 (Levemir) pen 5 Units         -- SubQ 2 times daily 09/20/23 0513    09/20/23 0115  insulin aspart U-100 pen 0-5 Units         -- SubQ Before meals & nightly PRN 09/20/23 0015        Hold Oral hypoglycemics while patient is in the hospital.    Dyslipidemia  History dyslipidemia   Continue atorvastatin 80 mg daily   Encouraged lifestyle modification.      Essential hypertension  History of hypertension  Systolic blood pressure was in the 140 to at presentation but now on 130/62 mm of Hg   Continue home antihypertensive with holding parameters   Low-salt diet      Acute kidney injury superimposed on CKD  Patient with acute kidney injury/acute renal failure likely due " to pre-renal azotemia from cardiorenal syndrome  History of diastolic heart failure with BNP 1100, prior to this was 487  Expecting improvement with diuresis  Strict input and output  ELIZABETH is currently stable. Baseline creatinine 2.1 to 2.5 - Labs reviewed- Renal function/electrolytes with Estimated Creatinine Clearance: 23.3 mL/min (A) (based on SCr of 3.5 mg/dL (H)). according to latest data.  Monitor urine output and serial BMP and adjust therapy as needed.   Avoid nephrotoxins and renally dose meds for GFR listed above.    Typical atrial flutter  History of atrial flutter  Heart rate was 61 at presentation  Currently on amiodarone and apixaban 5 mg b.i.d.  We will continue medication, he is hemodynamically stable at this time   Consult cardiology review prior to discharge.        VTE Risk Mitigation (From admission, onward)         Ordered     apixaban tablet 5 mg  2 times daily         09/20/23 0513     IP VTE HIGH RISK PATIENT  Once         09/20/23 0013     Place sequential compression device  Until discontinued         09/20/23 0013                   On 09/20/2023, patient should be placed in hospital observation services under my care.        Nolan Hutton MD  Department of Hospital Medicine  [facility] [place] - Telemetry

## 2023-09-20 NOTE — ASSESSMENT & PLAN NOTE
History of chronic diastolic heart failure  We will repeat 2D echo at this time   Continue diuresis   Strict input and output  Low-salt and cardiac diet.

## 2023-09-20 NOTE — ASSESSMENT & PLAN NOTE
Alcohol abuse likely in remission  Patient voiced not drinking alcohol in a while   Prior documented alcohol abstinence, not at risk of alcohol withdrawal at this time  For safety reasons we will place patient on CIWA protocol, but not on medication at this time  Current electrolyte abnormality, thiamine, folic acid and multivitamin daily.

## 2023-09-20 NOTE — ASSESSMENT & PLAN NOTE
History of hypertension  Systolic blood pressure was in the 140 to at presentation but now on 130/62 mm of Hg   Continue home antihypertensive with holding parameters   Low-salt diet

## 2023-09-20 NOTE — ASSESSMENT & PLAN NOTE
Nutrition consulted. Most recent weight and BMI monitored-     Measurements:  Wt Readings from Last 1 Encounters:   09/20/23 102.7 kg (226 lb 6.6 oz)   Body mass index is 31.58 kg/m².    Patient has been screened and assessed by RD.    Malnutrition Type:  Context:    Level:      Malnutrition Characteristic Summary:       Interventions/Recommendations (treatment strategy):

## 2023-09-20 NOTE — CONSULTS
[facility] [place] - Telemetry  Wound Care    Patient Name:  Chato Bowie   MRN:  [id number]  Date: 9/20/2023  Diagnosis: Chest pain    History:     Past Medical History:   Diagnosis Date    Acute congestive heart failure 3/20/2020    Alcohol abuse     Arthritis     Asthma attack 11/24/2021    Coronary artery disease     Diabetes mellitus     Hyperlipemia     Hypertension     Multiple thyroid nodules 6/14/2023    Rhabdomyolysis        Social History     Socioeconomic History    Marital status: Single   Tobacco Use    Smoking status: Never    Smokeless tobacco: Never   Substance and Sexual Activity    Alcohol use: Not Currently     Alcohol/week: 6.0 standard drinks of alcohol     Types: 6 Cans of beer per week     Comment: daily    Drug use: No    Sexual activity: Yes     Partners: Female     Social Determinants of Health     Financial Resource Strain: Medium Risk (6/15/2023)    Overall Financial Resource Strain (CARDIA)     Difficulty of Paying Living Expenses: Somewhat hard   Food Insecurity: No Food Insecurity (6/15/2023)    Hunger Vital Sign     Worried About Running Out of Food in the Last Year: Never true     Ran Out of Food in the Last Year: Never true   Transportation Needs: No Transportation Needs (6/15/2023)    PRAPARE - Transportation     Lack of Transportation (Medical): No     Lack of Transportation (Non-Medical): No   Physical Activity: Inactive (6/15/2023)    Exercise Vital Sign     Days of Exercise per Week: 0 days     Minutes of Exercise per Session: 0 min   Stress: Stress Concern Present (6/15/2023)    [employer] [place] [employer] - Occupational Stress Questionnaire     Feeling of Stress : To some extent   Social Connections: Socially Isolated (6/15/2023)    Social Connection and Isolation Panel [NHANES]     Frequency of Communication with Friends and Family: Once a week     Frequency of Social Gatherings with Friends and Family: Once a week     Attends [employer] Services: Never     Active Member  of Clubs or Organizations: No     Attends Club or Organization Meetings: Never     Marital Status: Never    Housing Stability: Low Risk  (6/15/2023)    Housing Stability Vital Sign     Unable to Pay for Housing in the Last Year: No     Number of Places Lived in the Last Year: 1     Unstable Housing in the Last Year: No       Precautions:     Allergies as of 09/19/2023    (No Known Allergies)       [facility] Assessment Details/Treatment   Nursing consult for altered skin integrity-wounds bilateral heels, buttocks, and lower back  A 72 year old male admitted to [facility] 9/19/23 from [facility] with chest pain; multiple wounds; acute on chronic diastolic heart failure; CKD Stage 4; acute on chronic diastolic congestive heart failure; paroxysmal atrial flutter; BPH; alcohol abuse- likely in remission; hypokalemia; anemia; DM II with kidney complication; dyslipidemia; essential hypertension; ELIZABETH superimposed on CKD; typical atrial flutter  9/20 WBC 8.57 Hgb 6.4 Hct 22.0 Alb 2.1 Weight 226 lbs A1C 5.5   On Isoflex mattress; Magdiel score 14; incontinent; bed mobility with assistance; positioned with foam wedge; EHOB boots  Assessment:  Photodocumentation    Left plantar heel- Mushy 5 cm area with dark roof. Appears to be dark blistered area suggesting DTI. States sits up in wheelchair at NH  Right heel/lateral foot- Dry peeling skin. No pressure injury identified  Right dorsal foot- Healed pink scar  Sacrum/gluteal cleft- Pink scar/healed  Right lateral back/waist level- 9 mm healing wound; possibly injury from garment or back of sitting surface; states up in wheelchair at NH  Treatment:  Local wound care to right back wound and left plantar heel with Mepilex foam dressing. Continue   EHOB boots to suspend heels off bed. Moisture management for urinary incontinence.   Recommendations made to primary team. Orders placed.     09/20/2023

## 2023-09-20 NOTE — ASSESSMENT & PLAN NOTE
History of atrial flutter  Heart rate was 61 at presentation  Currently on amiodarone and apixaban 5 mg b.i.d.  We will continue medication, he is hemodynamically stable at this time   Consult cardiology review prior to discharge.

## 2023-09-20 NOTE — ASSESSMENT & PLAN NOTE
likely anemia of chronic kidney disease  Currently has CKD stage 4, previous panel showed iron of 17, TIBC 207, saturation of 8, transferrin 140, ferritin 714  To continue supplementation.  No evidence of acute blood loss  Continue to correct iron deficiency.

## 2023-09-20 NOTE — ASSESSMENT & PLAN NOTE
He presented with chest pain  Said to be insidious in onset, nonradiating, although at previous chest pain  Chest pain is atypical in presentation, due to patient being high-risk  Diabetic, hypertensive, coronary artery disease, hyperlipidemia  Chest we will need workup and risk stratification  Troponin x3 were almost not 0.053-0.055-0.056  We will continue medical management at this time while awaiting Cardiology consult  Continue aspirin, atorvastatin, apixaban.

## 2023-09-20 NOTE — NURSING
[facility]  Nurses Note -- 4 Eyes      9/20/2023       Skin assessed on: Q Shift      [x] No Pressure Injuries Present    []Prevention Measures Documented  Bilat Lower Extremity Edema     [x] Yes LDA  for Pressure Injury Previously documented   Bilat Heels DTI, dark, dry flaky  Lower back pink, moist, open area  Sacrum Wound pink    [] Yes New Pressure Injury Discovered   [] LDA for New Pressure Injury Added      Attending RN:  Kristel Yuen LPN     Second RN:  Esthela COOK

## 2023-09-20 NOTE — PLAN OF CARE
09/20/23 0938   Discharge Planning   Assessment Type Discharge Planning Brief Assessment   Resource/Environmental Concerns none   Support Systems Family members   Equipment Currently Used at Home walker, rolling;power chair;cane, straight   Current Living Arrangements   (NH)   Care Facility Name [facility]   Patient/Family Anticipated Services at Transition   (NH)   DME Needed Upon Discharge  none   Discharge Plan A Return to nursing home

## 2023-09-20 NOTE — HPI
"72-year-old  male with medical history significant for congestive heart failure, coronary artery disease, type 2 diabetes mellitus, chronic kidney disease stage 4, hypertension, hyperlipidemia, multiple thyroid nodule and alcohol use disorder in remission was transferred from [facility] ED after presenting with chest pain of 1 hour duration, chest pain was said to be insidious in onset, non-exertional, as patient just finished breakfast and was watching tv when he noticed chest pain across his chest, described as stabbing/someone pounding on my chest, chest pain was nonradiating, said to be 10/10 at onset now 9/10, denied diaphoresis, no nausea, no vomiting although associated with palpitation.  He denied any cough, no change in baseline orthopnea of 2 pillows, no paroxysmal nocturnal dyspnea, although has 2+ of bilateral pitting pedal edema he denied fever, chills, rigors, denied urinary symptoms of dysuria, frequency, urgency, straining at micturition or hematuria.  No change in bowel habits, denied diarrhea, and constipation.  No previous history of tobacco use, voiced quitting alcohol sometimes back.  At presention to the outside hospital lab was significant for hypokalemia of 3.1, BUN creatinine of 69/3.5, with a baseline of 2.1-2.5, H&H 7.4/23.5.  Troponin was 0.053-0.055-0.056 essentially flat similar to previous, be in [facility] in the 1100, most recent prior to this was 487 in July 27, 2023.  CT abdomen and pelvis without contrast showed "bilateral pleural effusion diffuse body wall edema, smooth interlobular septal thickening which may relate to vascular congestion/mild interstitial edema".Pain was mildly improved with aspirin and nitro SG.Had similar chest pain in the past as well.  "

## 2023-09-20 NOTE — SUBJECTIVE & OBJECTIVE
Past Medical History:   Diagnosis Date    Acute congestive heart failure 3/20/2020    Alcohol abuse     Arthritis     Asthma attack 11/24/2021    Coronary artery disease     Diabetes mellitus     Hyperlipemia     Hypertension     Multiple thyroid nodules 6/14/2023    Rhabdomyolysis        Past Surgical History:   Procedure Laterality Date    EYE SURGERY      TRANSESOPHAGEAL ECHOCARDIOGRAM WITH POSSIBLE CARDIOVERSION (MARIA W/ POSS CARDIOVERSION) N/A 1/5/2023    Procedure: Transesophageal echo (MARIA) intra-procedure log documentation;  Surgeon: Indra Foote MD;  Location: [facility] CATH LAB;  Service: Cardiology;  Laterality: N/A;    TREATMENT OF CARDIAC ARRHYTHMIA N/A 12/7/2020    Procedure: Cardioversion or Defibrillation;  Surgeon: Indra Foote MD;  Location: [facility] CATH LAB;  Service: Cardiology;  Laterality: N/A;    TREATMENT OF CARDIAC ARRHYTHMIA N/A 12/30/2020    Procedure: Cardioversion or Defibrillation;  Surgeon: Tyrone Steen MD;  Location: [facility] CATH LAB;  Service: Cardiology;  Laterality: N/A;    TREATMENT OF CARDIAC ARRHYTHMIA N/A 1/5/2023    Procedure: Cardioversion or Defibrillation;  Surgeon: Indra Foote MD;  Location: [facility] CATH LAB;  Service: Cardiology;  Laterality: N/A;    VASCULAR SURGERY         Review of patient's allergies indicates:  No Known Allergies    No current facility-administered medications on file prior to encounter.     Current Outpatient Medications on File Prior to Encounter   Medication Sig    albuterol (PROVENTIL/VENTOLIN HFA) 90 mcg/actuation inhaler Inhale 2 puffs into the lungs every 6 (six) hours as needed for Wheezing or Shortness of Breath.    allopurinoL (ZYLOPRIM) 100 MG tablet Take 1 tablet (100 mg total) by mouth once daily.    amiodarone (PACERONE) 200 MG Tab Take 200 mg by mouth once daily.    amLODIPine (NORVASC) 10 MG tablet Take 1 tablet (10 mg total) by mouth once daily.    ascorbic acid, vitamin C, (VITAMIN C) 500 MG tablet Take 500 mg by mouth 2  (two) times daily.    benzonatate (TESSALON) 100 MG capsule Take 100 mg by mouth 3 (three) times daily as needed for Cough.    ELIQUIS 5 mg Tab Take 5 mg by mouth 2 (two) times daily.    ferrous sulfate (FEOSOL) 325 mg (65 mg iron) Tab tablet Take 325 mg by mouth once daily.    folic acid (FOLVITE) 1 MG tablet Take 1 tablet (1 mg total) by mouth once daily.    furosemide (LASIX) 80 MG tablet Take 1 tablet (80 mg total) by mouth 2 (two) times daily.    gabapentin (NEURONTIN) 100 MG capsule Take 1 capsule (100 mg total) by mouth 2 (two) times daily.    hydrALAZINE (APRESOLINE) 100 MG tablet Take 1 tablet (100 mg total) by mouth every 8 (eight) hours.    HYDROcodone-acetaminophen (NORCO) 5-325 mg per tablet Take 1 tablet by mouth every 8 (eight) hours as needed for Pain.    insulin detemir U-100, Levemir, 100 unit/mL (3 mL) SubQ InPn pen Inject 7 Units into the skin 2 (two) times daily.    LIDOcaine (LIDODERM) 5 % Place 2 patches onto the skin once daily. Remove & Discard patch within 12 hours or as directed by MD    metoprolol succinate (TOPROL-XL) 25 MG 24 hr tablet Take 25 mg by mouth once daily.    multivitamin (ONE DAILY MULTIVITAMIN) per tablet Take 1 tablet by mouth once daily.    potassium chloride (KLOR-CON) 10 MEQ TbSR Take 10 mEq by mouth once daily.    rosuvastatin (CRESTOR) 40 MG Tab Take 40 mg by mouth every evening.    tamsulosin (FLOMAX) 0.4 mg Cap Take 1 capsule (0.4 mg total) by mouth once daily.     Family History       Problem Relation (Age of Onset)    Diabetes Mother, Father, Sister    Hypertension Mother, Father, Sister          Tobacco Use    Smoking status: Never    Smokeless tobacco: Never   Substance and Sexual Activity    Alcohol use: Not Currently     Alcohol/week: 6.0 standard drinks of alcohol     Types: 6 Cans of beer per week     Comment: daily    Drug use: No    Sexual activity: Yes     Partners: Female     Review of Systems   Constitutional:  Negative for appetite change, chills,  diaphoresis and fatigue.   HENT:  Negative for congestion, sore throat and tinnitus.    Eyes:  Negative for pain, discharge and itching.   Respiratory:  Positive for shortness of breath. Negative for cough, chest tightness and wheezing.    Cardiovascular:  Positive for chest pain, palpitations and leg swelling.   Gastrointestinal:  Negative for abdominal distention, abdominal pain, blood in stool, nausea and vomiting.   Endocrine: Negative for cold intolerance, heat intolerance and polydipsia.   Genitourinary:  Negative for difficulty urinating, dysuria, flank pain, frequency and hematuria.   Musculoskeletal:  Negative for arthralgias, back pain and neck stiffness.   Skin:  Positive for wound (back wound and right foot).   Neurological:  Positive for weakness. Negative for dizziness, tremors, seizures, facial asymmetry, light-headedness, numbness and headaches.   Psychiatric/Behavioral:  Negative for agitation, confusion and hallucinations.      Objective:     Vital Signs (Most Recent):  Temp: 97.8 °F (36.6 °C) (09/20/23 0108)  Pulse: 61 (09/20/23 0108)  Resp: 20 (09/20/23 0108)  BP: 130/63 (09/20/23 0108)  SpO2: 99 % (09/20/23 0108) Vital Signs (24h Range):  Temp:  [97.8 °F (36.6 °C)-98.7 °F (37.1 °C)] 97.8 °F (36.6 °C)  Pulse:  [60-71] 61  Resp:  [15-20] 20  SpO2:  [95 %-99 %] 99 %  BP: (102-158)/(56-70) 130/63     Weight: 102.7 kg (226 lb 6.6 oz)  Body mass index is 31.58 kg/m².     Physical Exam  Constitutional:       General: He is not in acute distress.     Appearance: Normal appearance. He is obese. He is ill-appearing (chronically ill looking).   HENT:      Head: Normocephalic and atraumatic.      Nose: Nose normal. No congestion or rhinorrhea.      Mouth/Throat:      Mouth: Mucous membranes are moist.   Eyes:      Extraocular Movements: Extraocular movements intact.      Conjunctiva/sclera: Conjunctivae normal.      Pupils: Pupils are equal, round, and reactive to light.   Cardiovascular:      Rate and  "Rhythm: Normal rate. Rhythm irregular.      Pulses: Normal pulses.      Heart sounds: Murmur (pansystolic murmur) heard.      Gallop (elevated jvd) present.   Pulmonary:      Effort: Pulmonary effort is normal. No respiratory distress.      Breath sounds: Rales (bibasal crackles) present. No wheezing.   Chest:      Chest wall: Tenderness present.   Abdominal:      General: Abdomen is flat. Bowel sounds are normal. There is no distension.      Palpations: Abdomen is soft.      Tenderness: There is no abdominal tenderness. There is no right CVA tenderness, left CVA tenderness, guarding or rebound.   Musculoskeletal:         General: Normal range of motion.      Cervical back: Normal range of motion and neck supple.      Right lower leg: Edema (bilateral +2 edema) present.      Left lower leg: Edema present.   Skin:     General: Skin is warm and dry.      Coloration: Skin is jaundiced.      Findings: Lesion (sacral decubitus ulcer) present.   Neurological:      General: No focal deficit present.      Mental Status: He is alert and oriented to person, place, and time. Mental status is at baseline.      Cranial Nerves: No cranial nerve deficit.      Sensory: No sensory deficit.      Motor: Weakness present.   Psychiatric:         Mood and Affect: Mood normal.         Behavior: Behavior normal.         Thought Content: Thought content normal.         Judgment: Judgment normal.              CRANIAL NERVES     CN III, IV, VI   Pupils are equal, round, and reactive to light.       Significant Labs: All pertinent labs within the past 24 hours have been reviewed.  A1C:   Recent Labs   Lab 06/02/23  1859   HGBA1C 7.8*     Blood Culture: No results for input(s): "LABBLOO" in the last 48 hours.  BMP:   Recent Labs   Lab 09/19/23  1212 09/20/23  0446   * 80    137   K 3.1* 3.2*   CL 97 100   CO2 25 25   BUN 69* 76*   CREATININE 3.5*  --    CALCIUM 9.1 9.1   MG  --  2.2     CBC:   Recent Labs   Lab 09/19/23  1212 " "09/20/23  0446   WBC 9.75 8.57   HGB 7.4* 6.4*   HCT 23.5* 22.0*    265     CMP:   Recent Labs   Lab 09/19/23  1212 09/20/23  0446    137   K 3.1* 3.2*   CL 97 100   CO2 25 25   * 80   BUN 69* 76*   CREATININE 3.5*  --    CALCIUM 9.1 9.1   PROT 7.3 6.7   ALBUMIN 2.3* 2.1*   BILITOT 0.8 0.7   ALKPHOS 92 87   AST 24 22   ALT 22 20   ANIONGAP 15 12     Cardiac Markers:   Recent Labs   Lab 09/19/23  1212   BNP 1,100*     Lactic Acid: No results for input(s): "LACTATE" in the last 48 hours.  Lipase: No results for input(s): "LIPASE" in the last 48 hours.  Prealbumin: No results for input(s): "PREALBUMIN" in the last 48 hours.  Troponin:   Recent Labs   Lab 09/19/23  1212 09/19/23  1508   TROPONINI 0.055*  0.053* 0.056*     TSH:   Recent Labs   Lab 07/27/23  1756   TSH 2.597       Significant Imaging: I have reviewed all pertinent imaging results/findings within the past 24 hours..  Imaging Results              CT Abdomen Pelvis  Without Contrast (Final result)  Result time 09/19/23 14:05:40      Final result by Jaswant Dove MD (09/19/23 14:05:40)                   Impression:      1. Bilateral pleural effusions and diffuse body wall edema.  Smooth interlobular septal thickening may relate to vascular congestion/mild interstitial edema.  Correlation with patient volume status recommended.  2. Additional details of chronic and incidental findings, as provided in the body of the report.      Electronically signed by: Jaswant Dove  Date:    09/19/2023  Time:    14:05               Narrative:    EXAMINATION:  CT ABDOMEN PELVIS WITHOUT CONTRAST    CLINICAL HISTORY:  Bowel obstruction suspected;    TECHNIQUE:  Low dose axial images, sagittal and coronal reformations were obtained from the lung bases to the pubic symphysis.    COMPARISON:  Retroperitoneal ultrasound 07/19/2023.  CT of the abdomen pelvis performed 07/17/2023.    FINDINGS:  This examination is limited due to lack of intravenous " contrast.    Lower chest: Cardiac enlargement.  Cardiac valvular and coronary artery calcifications.  Relative hypoattenuation of the blood pool may be seen the setting of anemia.  Small to moderate right and small left pleural effusions.  Basilar atelectasis.  Areas of scarring.  Smooth interlobular septal thickening.    Liver: Normal contour.  Suspect calcified granulomas.    Gallbladder and bile ducts: Cholelithiasis and possible biliary sludge versus additional small stones.  No definite focal pericholecystic inflammation.  No new biliary ductal dilatation.    Pancreas: Normal contour.    Spleen: Normal contour.  Suspect calcified granulomas.    Adrenals: Normal contour.    Kidneys: Nonspecific perinephric stranding    Lymph nodes: No abdominal or pelvic lymphadenopathy.    Bowel and mesentery: Small hiatal hernia.  No evidence of bowel obstruction.  Unremarkable appendix.    Abdominal aorta: Nonaneurysmal.  Moderate to heavy atherosclerosis.    Inferior vena cava: Unremarkable.    Free fluid or free air: None.    Pelvis: Unremarkable.    Urinary bladder: Unremarkable.    Body wall: Rectus diastasis.  Mild diffuse body wall edema.    Bones: No definite acute change.  Degenerative findings of the spine appreciated.                                       X-Ray Chest AP Portable (Final result)  Result time 09/19/23 13:38:42      Final result by Adalgisa Jin MD (09/19/23 13:38:42)                   Impression:      Cardiomegaly with possible mild CHF.    Bilateral small pleural effusion and bibasilar subsegmental atelectasis slightly worse on the right.      Electronically signed by: Adalgisa Jin MD  Date:    09/19/2023  Time:    13:38               Narrative:    EXAMINATION:  XR CHEST AP PORTABLE    CLINICAL HISTORY:  Chest pain, unspecified    TECHNIQUE:  Single frontal view of the chest was performed.    COMPARISON:  08/09/2023    FINDINGS:  The cardiac silhouette is enlarged.  The pulmonary  vascularity is increased centrally.  No superimposed focal airspace disease.  Small bilateral pleural effusion right more than left and minimal atelectasis at the lung bases bilaterally as noted on CT 09/19/2023.    No pneumothorax or pleural effusion.

## 2023-09-20 NOTE — ASSESSMENT & PLAN NOTE
Patient is identified as having Diastolic (HFpEF) heart failure that is Acute on chronic. CHF is currently uncontrolled due to Continued edema of extremities.   Latest ECHO performed and demonstrates- Results for orders placed during the hospital encounter of 06/14/23    Echo    Interpretation Summary  · The left ventricle is normal in size with concentric hypertrophy and normal systolic function.  · The estimated ejection fraction is 60%.  · Grade II left ventricular diastolic dysfunction.  · Normal right ventricular size with normal right ventricular systolic function.  · Moderate left atrial enlargement.  · There is moderate aortic valve stenosis.  · Aortic valve area is 1.43 cm2; peak velocity is 3.94 m/s; mean gradient is 31 mmHg.  · Mild-to-moderate aortic regurgitation.  · Mild mitral regurgitation.  · Normal central venous pressure (3 mmHg).  · The estimated PA systolic pressure is 39 mmHg.  . Continue Furosemide and monitor clinical status closely. Monitor on telemetry. Patient is off CHF pathway.  Monitor strict Is&Os and daily weights.  Place on fluid restriction of 1.5 L. Cardiology has not been any consulted. Continue to stress to patient importance of self efficacy and  on diet for CHF. Last BNP reviewed- and noted below   Recent Labs   Lab 09/19/23  1212   BNP 1,100*   .

## 2023-09-20 NOTE — ASSESSMENT & PLAN NOTE
"Patient's FSGs are uncontrolled due to hyperglycemia on current medication regimen.  Last A1c reviewed-   Lab Results   Component Value Date    HGBA1C 7.8 (H) 06/02/2023     Most recent fingerstick glucose reviewed- No results for input(s): "POCTGLUCOSE" in the last 24 hours.  Current correctional scale  Medium  Maintain anti-hyperglycemic dose as follows-   Antihyperglycemics (From admission, onward)    Start     Stop Route Frequency Ordered    09/20/23 0900  insulin detemir U-100 (Levemir) pen 5 Units         -- SubQ 2 times daily 09/20/23 0513    09/20/23 0115  insulin aspart U-100 pen 0-5 Units         -- SubQ Before meals & nightly PRN 09/20/23 0015        Hold Oral hypoglycemics while patient is in the hospital.  "

## 2023-09-20 NOTE — CONSULTS
[facility]    Patient Complaint: shortness of breath    PCP:   Irlanda Valenzuela -       LOS: 0        Initial History of Present Illness (HPI):  This is a 72 y.o. male consulted to GI service for melena and worsening anemia. PMH HFpEF, coronary artery disease, paroxysmal atrial flutter (on Eliquis), diabetes, hypertension, hyperlipidemia, CKD, and recent COVID (COVID positive July 27 and August 11).  Patient complaint of acute onset of shortness of breath accompanied by chest pain with nausea and vomiting that began 4 days ago. He arrived to the ED at [facility] with seems to be in CHF exacerbation and transferred to  d/t bed availability. He denies any recent n/v, hematemesis, abdominal pain, BRBPR, diarrhea or dark stools. He reports noticing melena stool ~2 months ago but has since had a normal stool 4 days ago. He reports taking eliquis for aflutter, he is unsure of when was last dose. Reports he quit using alcohol heavily some months ago this year. Denies ever having colonoscopy, had an EGD in 2016, report below.     Admit hgb 7.4, k 3.1        Medical History:  has a past medical history of Acute congestive heart failure (3/20/2020), Alcohol abuse, Arthritis, Asthma attack (11/24/2021), Coronary artery disease, Diabetes mellitus, Hyperlipemia, Hypertension, Multiple thyroid nodules (6/14/2023), and Rhabdomyolysis.    Surgical History:  has a past surgical history that includes Eye surgery; Vascular surgery; Treatment of cardiac arrhythmia (N/A, 12/7/2020); Treatment of cardiac arrhythmia (N/A, 12/30/2020); transesophageal echocardiogram with possible cardioversion (shabnam w/ poss cardioversion) (N/A, 1/5/2023); and Treatment of cardiac arrhythmia (N/A, 1/5/2023).      Objective Findings:    Vital Signs:  Temp:  [97.6 °F (36.4 °C)-98.4 °F (36.9 °C)]   Pulse:  [59-66]   Resp:  [15-20]   BP: (111-158)/(54-70)   SpO2:  [95 %-100 %]   Body mass index is 31.58  kg/m².      Physical Exam  Vitals and nursing note reviewed.   Constitutional:       Appearance: He is obese. He is ill-appearing.   HENT:      Head: Normocephalic.   Pulmonary:      Effort: Pulmonary effort is normal.   Abdominal:      General: Bowel sounds are normal. There is distension.      Palpations: Abdomen is soft.   Skin:     General: Skin is warm and dry.   Neurological:      Mental Status: He is alert and oriented to person, place, and time.   Psychiatric:         Mood and Affect: Mood normal.         Behavior: Behavior normal.         Thought Content: Thought content normal.         Judgment: Judgment normal.               Labs:  Lab Results   Component Value Date    WBC 8.57 2023    HGB 6.4 (L) 2023    HCT 22.0 (L) 2023     2023    CHOL 78 (L) 2023    TRIG 52 2023    HDL 24 (L) 2023    ALT 20 2023    AST 22 2023     2023    K 3.2 (L) 2023     2023    CREATININE 3.5 (H) 2023    BUN 76 (H) 2023    CO2 25 2023    TSH 3.224 2023    INR 1.3 (H) 2023    HGBA1C 7.8 (H) 2023             Imagin/19 CT abdomen pelvis-Liver: Normal contour.  Suspect calcified granulomas.  Gallbladder and bile ducts: Cholelithiasis and possible biliary sludge versus additional small stones.  No definite focal pericholecystic inflammation.  No new biliary ductal dilatation.  Pancreas: Normal contour.  Spleen: Normal contour.  Suspect calcified granulomas.   Bowel and mesentery: Small hiatal hernia.  No evidence of bowel obstruction.  Unremarkable appendix.         Endoscopy: 3/2016 EGD- Normal esophagus.                        - Erythematous mucosa in the antrum. Biopsied.                        - Erythematous duodenopathy. Biopsied.     I have independently reviewed and interpreted the imaging above            Shortness of breath. Chest pain. Chronic Anemia. Hypokalemia.Current anticoagulant use.  Plan/  Recommendations:  1.  Reports shortness of breath and on 2 lpm nc, says chest pain is improved but not resolved. ECHO today. Denies any recent overt bleeding. Anemia possibly multifactorial. Will await ECHO results and to optimize from a pulmonary standpoint prior to consideration of inpatient endoscopy. Rec transfuse under 7, continue to hold anticoagulation. Ok to resume diet, will re-eval in the am, for endoscopy in vs outpatient.        Thank you so much for allowing us to participate in the care of Chato Bowie . Please contact us if you have any additional questions.    Dorothy Cheney NP  Gastroenterology  [facility] - Med Surg

## 2023-09-20 NOTE — ASSESSMENT & PLAN NOTE
History of CKD stage 5   Baseline creatinine at 2.1-2.5, presented with creatinine of 3.5  This is likely ELIZABETH on CKD stage 4  In view of worsening decompensated heart failure this may be cardiorenal syndrome   Other evidence GI loss/acute blood loss  No NSAID use or antibiotics  Expected improvement with diuresis and better cardiac function.

## 2023-09-20 NOTE — ASSESSMENT & PLAN NOTE
Patient with acute kidney injury/acute renal failure likely due to pre-renal azotemia from cardiorenal syndrome  History of diastolic heart failure with BNP 1100, prior to this was 487  Expecting improvement with diuresis  Strict input and output  ELIZABETH is currently stable. Baseline creatinine 2.1 to 2.5 - Labs reviewed- Renal function/electrolytes with Estimated Creatinine Clearance: 23.3 mL/min (A) (based on SCr of 3.5 mg/dL (H)). according to latest data.  Monitor urine output and serial BMP and adjust therapy as needed.   Avoid nephrotoxins and renally dose meds for GFR listed above.

## 2023-09-20 NOTE — ASSESSMENT & PLAN NOTE
Noticed to have decubitus ulcers  With sacral 2 x 1 cm  Dressed, consult wound care at this time.  Frequent turning

## 2023-09-21 ENCOUNTER — TELEPHONE (OUTPATIENT)
Dept: VASCULAR SURGERY | Facility: CLINIC | Age: 73
End: 2023-09-21
Payer: MEDICARE

## 2023-09-21 ENCOUNTER — ANESTHESIA (OUTPATIENT)
Dept: CARDIOLOGY | Facility: HOSPITAL | Age: 73
DRG: 291 | End: 2023-09-21
Payer: MEDICARE

## 2023-09-21 ENCOUNTER — TELEPHONE (OUTPATIENT)
Dept: GASTROENTEROLOGY | Facility: CLINIC | Age: 73
End: 2023-09-21
Payer: MEDICARE

## 2023-09-21 ENCOUNTER — ANESTHESIA EVENT (OUTPATIENT)
Dept: CARDIOLOGY | Facility: HOSPITAL | Age: 73
DRG: 291 | End: 2023-09-21
Payer: MEDICARE

## 2023-09-21 PROBLEM — Z71.89 ACP (ADVANCE CARE PLANNING): Status: ACTIVE | Noted: 2023-09-21

## 2023-09-21 PROBLEM — I51.89 LEFT ATRIAL MASS: Status: ACTIVE | Noted: 2023-09-21

## 2023-09-21 LAB
ANION GAP SERPL CALC-SCNC: 13 MMOL/L (ref 8–16)
AV REGURGITATION PRESSURE HALF TIME: 378.35 MS
BASOPHILS # BLD AUTO: 0.07 K/UL (ref 0–0.2)
BASOPHILS NFR BLD: 0.9 % (ref 0–1.9)
BSA FOR ECHO PROCEDURE: 2.27 M2
BUN SERPL-MCNC: 74 MG/DL (ref 8–23)
CALCIUM SERPL-MCNC: 9 MG/DL (ref 8.7–10.5)
CHLORIDE SERPL-SCNC: 99 MMOL/L (ref 95–110)
CO2 SERPL-SCNC: 26 MMOL/L (ref 23–29)
CREAT SERPL-MCNC: 3.4 MG/DL (ref 0.5–1.4)
DIFFERENTIAL METHOD: ABNORMAL
EOSINOPHIL # BLD AUTO: 0.5 K/UL (ref 0–0.5)
EOSINOPHIL NFR BLD: 6.3 % (ref 0–8)
ERYTHROCYTE [DISTWIDTH] IN BLOOD BY AUTOMATED COUNT: 15.5 % (ref 11.5–14.5)
EST. GFR  (NO RACE VARIABLE): 18 ML/MIN/1.73 M^2
GLUCOSE SERPL-MCNC: 86 MG/DL (ref 70–110)
HCT VFR BLD AUTO: 24.2 % (ref 40–54)
HGB BLD-MCNC: 7.5 G/DL (ref 14–18)
IMM GRANULOCYTES # BLD AUTO: 0.04 K/UL (ref 0–0.04)
IMM GRANULOCYTES NFR BLD AUTO: 0.5 % (ref 0–0.5)
LYMPHOCYTES # BLD AUTO: 0.9 K/UL (ref 1–4.8)
LYMPHOCYTES NFR BLD: 10.5 % (ref 18–48)
MCH RBC QN AUTO: 24.6 PG (ref 27–31)
MCHC RBC AUTO-ENTMCNC: 31 G/DL (ref 32–36)
MCV RBC AUTO: 79 FL (ref 82–98)
MONOCYTES # BLD AUTO: 0.7 K/UL (ref 0.3–1)
MONOCYTES NFR BLD: 9.1 % (ref 4–15)
NEUTROPHILS # BLD AUTO: 5.9 K/UL (ref 1.8–7.7)
NEUTROPHILS NFR BLD: 72.7 % (ref 38–73)
NRBC BLD-RTO: 0 /100 WBC
PISA AR MAX VEL: 2.97 M/S
PLATELET # BLD AUTO: 260 K/UL (ref 150–450)
PMV BLD AUTO: 7.9 FL (ref 9.2–12.9)
POCT GLUCOSE: 109 MG/DL (ref 70–110)
POCT GLUCOSE: 140 MG/DL (ref 70–110)
POCT GLUCOSE: 144 MG/DL (ref 70–110)
POCT GLUCOSE: 77 MG/DL (ref 70–110)
POCT GLUCOSE: 87 MG/DL (ref 70–110)
POTASSIUM SERPL-SCNC: 2.9 MMOL/L (ref 3.5–5.1)
RBC # BLD AUTO: 3.05 M/UL (ref 4.6–6.2)
SODIUM SERPL-SCNC: 138 MMOL/L (ref 136–145)
WBC # BLD AUTO: 8.12 K/UL (ref 3.9–12.7)

## 2023-09-21 PROCEDURE — 63600175 PHARM REV CODE 636 W HCPCS: Performed by: STUDENT IN AN ORGANIZED HEALTH CARE EDUCATION/TRAINING PROGRAM

## 2023-09-21 PROCEDURE — D9220A PRA ANESTHESIA: Mod: CRNA,,, | Performed by: NURSE ANESTHETIST, CERTIFIED REGISTERED

## 2023-09-21 PROCEDURE — 99213 OFFICE O/P EST LOW 20 MIN: CPT | Mod: ,,, | Performed by: INTERNAL MEDICINE

## 2023-09-21 PROCEDURE — 25000003 PHARM REV CODE 250: Performed by: NURSE ANESTHETIST, CERTIFIED REGISTERED

## 2023-09-21 PROCEDURE — 96376 TX/PRO/DX INJ SAME DRUG ADON: CPT

## 2023-09-21 PROCEDURE — D9220A PRA ANESTHESIA: ICD-10-PCS | Mod: CRNA,,, | Performed by: NURSE ANESTHETIST, CERTIFIED REGISTERED

## 2023-09-21 PROCEDURE — 99223 1ST HOSP IP/OBS HIGH 75: CPT | Mod: ,,, | Performed by: REGISTERED NURSE

## 2023-09-21 PROCEDURE — 80048 BASIC METABOLIC PNL TOTAL CA: CPT | Performed by: STUDENT IN AN ORGANIZED HEALTH CARE EDUCATION/TRAINING PROGRAM

## 2023-09-21 PROCEDURE — 99223 PR INITIAL HOSPITAL CARE,LEVL III: ICD-10-PCS | Mod: ,,, | Performed by: REGISTERED NURSE

## 2023-09-21 PROCEDURE — 85025 COMPLETE CBC W/AUTO DIFF WBC: CPT | Performed by: STUDENT IN AN ORGANIZED HEALTH CARE EDUCATION/TRAINING PROGRAM

## 2023-09-21 PROCEDURE — 21400001 HC TELEMETRY ROOM

## 2023-09-21 PROCEDURE — 63600175 PHARM REV CODE 636 W HCPCS: Performed by: NURSE ANESTHETIST, CERTIFIED REGISTERED

## 2023-09-21 PROCEDURE — 25000003 PHARM REV CODE 250: Performed by: STUDENT IN AN ORGANIZED HEALTH CARE EDUCATION/TRAINING PROGRAM

## 2023-09-21 PROCEDURE — 37000009 HC ANESTHESIA EA ADD 15 MINS: Performed by: INTERNAL MEDICINE

## 2023-09-21 PROCEDURE — 99900035 HC TECH TIME PER 15 MIN (STAT)

## 2023-09-21 PROCEDURE — 87040 BLOOD CULTURE FOR BACTERIA: CPT | Performed by: STUDENT IN AN ORGANIZED HEALTH CARE EDUCATION/TRAINING PROGRAM

## 2023-09-21 PROCEDURE — 99213 PR OFFICE/OUTPT VISIT, EST, LEVL III, 20-29 MIN: ICD-10-PCS | Mod: ,,, | Performed by: INTERNAL MEDICINE

## 2023-09-21 PROCEDURE — 36415 COLL VENOUS BLD VENIPUNCTURE: CPT | Performed by: STUDENT IN AN ORGANIZED HEALTH CARE EDUCATION/TRAINING PROGRAM

## 2023-09-21 PROCEDURE — 37000008 HC ANESTHESIA 1ST 15 MINUTES: Performed by: INTERNAL MEDICINE

## 2023-09-21 PROCEDURE — 99497 ADVNCD CARE PLAN 30 MIN: CPT | Mod: 25,,, | Performed by: REGISTERED NURSE

## 2023-09-21 PROCEDURE — D9220A PRA ANESTHESIA: Mod: ANES,,, | Performed by: ANESTHESIOLOGY

## 2023-09-21 PROCEDURE — D9220A PRA ANESTHESIA: ICD-10-PCS | Mod: ANES,,, | Performed by: ANESTHESIOLOGY

## 2023-09-21 PROCEDURE — 99497 PR ADVNCD CARE PLAN 30 MIN: ICD-10-PCS | Mod: 25,,, | Performed by: REGISTERED NURSE

## 2023-09-21 PROCEDURE — 96372 THER/PROPH/DIAG INJ SC/IM: CPT | Performed by: STUDENT IN AN ORGANIZED HEALTH CARE EDUCATION/TRAINING PROGRAM

## 2023-09-21 RX ORDER — LIDOCAINE HYDROCHLORIDE 40 MG/ML
SOLUTION TOPICAL
Status: COMPLETED
Start: 2023-09-21 | End: 2023-09-21

## 2023-09-21 RX ORDER — POTASSIUM CHLORIDE 20 MEQ/1
40 TABLET, EXTENDED RELEASE ORAL 2 TIMES DAILY
Status: COMPLETED | OUTPATIENT
Start: 2023-09-21 | End: 2023-09-21

## 2023-09-21 RX ORDER — SODIUM CHLORIDE 0.9 % (FLUSH) 0.9 %
10 SYRINGE (ML) INJECTION
Status: DISCONTINUED | OUTPATIENT
Start: 2023-09-21 | End: 2023-09-23 | Stop reason: HOSPADM

## 2023-09-21 RX ORDER — PROPOFOL 10 MG/ML
VIAL (ML) INTRAVENOUS
Status: DISCONTINUED | OUTPATIENT
Start: 2023-09-21 | End: 2023-09-21

## 2023-09-21 RX ORDER — LIDOCAINE HYDROCHLORIDE 40 MG/ML
SOLUTION TOPICAL
Status: DISCONTINUED | OUTPATIENT
Start: 2023-09-21 | End: 2023-09-21

## 2023-09-21 RX ADMIN — FUROSEMIDE 40 MG: 10 INJECTION, SOLUTION INTRAVENOUS at 05:09

## 2023-09-21 RX ADMIN — FOLIC ACID 1 MG: 1 TABLET ORAL at 11:09

## 2023-09-21 RX ADMIN — ATORVASTATIN CALCIUM 80 MG: 40 TABLET, FILM COATED ORAL at 11:09

## 2023-09-21 RX ADMIN — GABAPENTIN 100 MG: 100 CAPSULE ORAL at 09:09

## 2023-09-21 RX ADMIN — INSULIN DETEMIR 5 UNITS: 100 INJECTION, SOLUTION SUBCUTANEOUS at 11:09

## 2023-09-21 RX ADMIN — SODIUM CHLORIDE, SODIUM LACTATE, POTASSIUM CHLORIDE, AND CALCIUM CHLORIDE: .6; .31; .03; .02 INJECTION, SOLUTION INTRAVENOUS at 02:09

## 2023-09-21 RX ADMIN — PROPOFOL 20 MG: 10 INJECTION, EMULSION INTRAVENOUS at 02:09

## 2023-09-21 RX ADMIN — POTASSIUM CHLORIDE 40 MEQ: 1500 TABLET, EXTENDED RELEASE ORAL at 09:09

## 2023-09-21 RX ADMIN — ALLOPURINOL 100 MG: 100 TABLET ORAL at 11:09

## 2023-09-21 RX ADMIN — LIDOCAINE HYDROCHLORIDE 4 ML: 40 SOLUTION TOPICAL at 02:09

## 2023-09-21 RX ADMIN — METOPROLOL SUCCINATE 25 MG: 25 TABLET, EXTENDED RELEASE ORAL at 11:09

## 2023-09-21 RX ADMIN — FUROSEMIDE 40 MG: 10 INJECTION, SOLUTION INTRAVENOUS at 02:09

## 2023-09-21 RX ADMIN — FUROSEMIDE 40 MG: 10 INJECTION, SOLUTION INTRAVENOUS at 09:09

## 2023-09-21 RX ADMIN — THERA TABS 1 TABLET: TAB at 11:09

## 2023-09-21 RX ADMIN — FERROUS SULFATE TAB 325 MG (65 MG ELEMENTAL FE) 1 EACH: 325 (65 FE) TAB at 11:09

## 2023-09-21 RX ADMIN — POTASSIUM CHLORIDE 40 MEQ: 1500 TABLET, EXTENDED RELEASE ORAL at 11:09

## 2023-09-21 RX ADMIN — GLYCOPYRROLATE 0.1 MG: 0.2 INJECTION, SOLUTION INTRAMUSCULAR; INTRAVITREAL at 02:09

## 2023-09-21 RX ADMIN — LIDOCAINE 5% 1 PATCH: 700 PATCH TOPICAL at 11:09

## 2023-09-21 RX ADMIN — AMIODARONE HYDROCHLORIDE 200 MG: 200 TABLET ORAL at 11:09

## 2023-09-21 RX ADMIN — GABAPENTIN 100 MG: 100 CAPSULE ORAL at 11:09

## 2023-09-21 RX ADMIN — INSULIN DETEMIR 5 UNITS: 100 INJECTION, SOLUTION SUBCUTANEOUS at 09:09

## 2023-09-21 RX ADMIN — PROPOFOL 80 MG: 10 INJECTION, EMULSION INTRAVENOUS at 02:09

## 2023-09-21 NOTE — SUBJECTIVE & OBJECTIVE
Past Medical History:   Diagnosis Date    Acute congestive heart failure 3/20/2020    Alcohol abuse     Arthritis     Asthma attack 11/24/2021    Coronary artery disease     Diabetes mellitus     Hyperlipemia     Hypertension     Multiple thyroid nodules 6/14/2023    Rhabdomyolysis        Past Surgical History:   Procedure Laterality Date    EYE SURGERY      TRANSESOPHAGEAL ECHOCARDIOGRAM WITH POSSIBLE CARDIOVERSION (MARIA W/ POSS CARDIOVERSION) N/A 1/5/2023    Procedure: Transesophageal echo (MARIA) intra-procedure log documentation;  Surgeon: Indra Foote MD;  Location: [facility] CATH LAB;  Service: Cardiology;  Laterality: N/A;    TREATMENT OF CARDIAC ARRHYTHMIA N/A 12/7/2020    Procedure: Cardioversion or Defibrillation;  Surgeon: Indra Foote MD;  Location: [facility] CATH LAB;  Service: Cardiology;  Laterality: N/A;    TREATMENT OF CARDIAC ARRHYTHMIA N/A 12/30/2020    Procedure: Cardioversion or Defibrillation;  Surgeon: Tyrone Steen MD;  Location: [facility] CATH LAB;  Service: Cardiology;  Laterality: N/A;    TREATMENT OF CARDIAC ARRHYTHMIA N/A 1/5/2023    Procedure: Cardioversion or Defibrillation;  Surgeon: Indra Foote MD;  Location: [facility] CATH LAB;  Service: Cardiology;  Laterality: N/A;    VASCULAR SURGERY         Review of patient's allergies indicates:  No Known Allergies    No current facility-administered medications on file prior to encounter.     Current Outpatient Medications on File Prior to Encounter   Medication Sig    albuterol (PROVENTIL/VENTOLIN HFA) 90 mcg/actuation inhaler Inhale 2 puffs into the lungs every 6 (six) hours as needed for Wheezing or Shortness of Breath.    allopurinoL (ZYLOPRIM) 100 MG tablet Take 1 tablet (100 mg total) by mouth once daily.    amiodarone (PACERONE) 200 MG Tab Take 200 mg by mouth once daily.    amLODIPine (NORVASC) 10 MG tablet Take 1 tablet (10 mg total) by mouth once daily.    ascorbic acid, vitamin C, (VITAMIN C) 500 MG tablet Take 500 mg by mouth 2  (two) times daily.    benzonatate (TESSALON) 100 MG capsule Take 100 mg by mouth 3 (three) times daily as needed for Cough.    ELIQUIS 5 mg Tab Take 5 mg by mouth 2 (two) times daily.    ferrous sulfate (FEOSOL) 325 mg (65 mg iron) Tab tablet Take 325 mg by mouth once daily.    folic acid (FOLVITE) 1 MG tablet Take 1 tablet (1 mg total) by mouth once daily.    furosemide (LASIX) 80 MG tablet Take 1 tablet (80 mg total) by mouth 2 (two) times daily.    gabapentin (NEURONTIN) 100 MG capsule Take 1 capsule (100 mg total) by mouth 2 (two) times daily.    hydrALAZINE (APRESOLINE) 100 MG tablet Take 1 tablet (100 mg total) by mouth every 8 (eight) hours.    HYDROcodone-acetaminophen (NORCO) 5-325 mg per tablet Take 1 tablet by mouth every 8 (eight) hours as needed for Pain.    insulin detemir U-100, Levemir, 100 unit/mL (3 mL) SubQ InPn pen Inject 7 Units into the skin 2 (two) times daily.    LIDOcaine (LIDODERM) 5 % Place 2 patches onto the skin once daily. Remove & Discard patch within 12 hours or as directed by MD    metoprolol succinate (TOPROL-XL) 25 MG 24 hr tablet Take 25 mg by mouth once daily.    multivitamin (ONE DAILY MULTIVITAMIN) per tablet Take 1 tablet by mouth once daily.    potassium chloride (KLOR-CON) 10 MEQ TbSR Take 10 mEq by mouth once daily.    rosuvastatin (CRESTOR) 40 MG Tab Take 40 mg by mouth every evening.    tamsulosin (FLOMAX) 0.4 mg Cap Take 1 capsule (0.4 mg total) by mouth once daily.     Family History       Problem Relation (Age of Onset)    Diabetes Mother, Father, Sister    Hypertension Mother, Father, Sister          Tobacco Use    Smoking status: Never    Smokeless tobacco: Never   Substance and Sexual Activity    Alcohol use: Not Currently     Alcohol/week: 6.0 standard drinks of alcohol     Types: 6 Cans of beer per week     Comment: daily    Drug use: No    Sexual activity: Yes     Partners: Female     Review of Systems   Constitutional: Negative.   HENT: Negative.     Eyes:  Negative.    Cardiovascular:  Positive for chest pain and dyspnea on exertion.   Respiratory: Negative.     Endocrine: Negative.    Hematologic/Lymphatic: Negative.    Skin: Negative.    Musculoskeletal: Negative.    Gastrointestinal: Negative.    Genitourinary: Negative.    Neurological: Negative.    Psychiatric/Behavioral: Negative.     Allergic/Immunologic: Negative.      Objective:     Vital Signs (Most Recent):  Temp: 98.1 °F (36.7 °C) (09/21/23 1127)  Pulse: 62 (09/21/23 1128)  Resp: 20 (09/21/23 1127)  BP: 138/63 (09/21/23 1128)  SpO2: 100 % (09/21/23 1127) Vital Signs (24h Range):  Temp:  [97.4 °F (36.3 °C)-98.5 °F (36.9 °C)] 98.1 °F (36.7 °C)  Pulse:  [60-65] 62  Resp:  [17-20] 20  SpO2:  [96 %-100 %] 100 %  BP: (104-138)/(55-63) 138/63     Weight: 100.4 kg (221 lb 5.5 oz)  Body mass index is 30.87 kg/m².    SpO2: 100 %         Intake/Output Summary (Last 24 hours) at 9/21/2023 1248  Last data filed at 9/21/2023 1127  Gross per 24 hour   Intake 413.33 ml   Output 2100 ml   Net -1686.67 ml       Lines/Drains/Airways       Drain  Duration             Male External Urinary Catheter 09/20/23 1705 Other (Comment) <1 day              Peripheral Intravenous Line  Duration                  Peripheral IV - Single Lumen 09/19/23 1213 20 G Left Antecubital 2 days                     Physical Exam  Vitals reviewed.   Constitutional:       Appearance: He is well-developed.   HENT:      Head: Normocephalic.   Eyes:      Conjunctiva/sclera: Conjunctivae normal.      Pupils: Pupils are equal, round, and reactive to light.   Cardiovascular:      Rate and Rhythm: Normal rate and regular rhythm.      Heart sounds: Normal heart sounds.   Pulmonary:      Effort: Pulmonary effort is normal.      Breath sounds: Normal breath sounds.   Abdominal:      General: Bowel sounds are normal.      Palpations: Abdomen is soft.   Musculoskeletal:      Cervical back: Normal range of motion and neck supple.   Skin:     General: Skin is warm.  "  Neurological:      Mental Status: He is alert and oriented to person, place, and time.          Significant Labs: BMP:   Recent Labs   Lab 09/20/23  0446 09/21/23  0909   GLU 80 86    138   K 3.2* 2.9*    99   CO2 25 26   BUN 76* 74*   CREATININE 3.5* 3.4*   CALCIUM 9.1 9.0   MG 2.2  --    , CMP   Recent Labs   Lab 09/20/23  0446 09/21/23  0909    138   K 3.2* 2.9*    99   CO2 25 26   GLU 80 86   BUN 76* 74*   CREATININE 3.5* 3.4*   CALCIUM 9.1 9.0   PROT 6.7  --    ALBUMIN 2.1*  --    BILITOT 0.7  --    ALKPHOS 87  --    AST 22  --    ALT 20  --    ANIONGAP 12 13   , CBC   Recent Labs   Lab 09/20/23  0446 09/21/23  0909   WBC 8.57 8.12   HGB 6.4* 7.5*   HCT 22.0* 24.2*    260   , INR No results for input(s): "INR", "PROTIME" in the last 48 hours., Lipid Panel   Recent Labs   Lab 09/20/23  0446   CHOL 78*   HDL 24*   LDLCALC 43.6*   TRIG 52   CHOLHDL 30.8   , Troponin   Recent Labs   Lab 09/19/23  1508 09/20/23  0446   TROPONINI 0.056* 0.051*   , and All pertinent lab results from the last 24 hours have been reviewed.    Significant Imaging: Echocardiogram: Transthoracic echo (TTE) complete (Cupid Only):   Results for orders placed or performed during the hospital encounter of 09/19/23   Echo   Result Value Ref Range    BSA 2.27 m2    LVOT stroke volume 117.18 cm3    LVIDd 5.22 3.5 - 6.0 cm    LV Systolic Volume 40.81 mL    LV Systolic Volume Index 18.4 mL/m2    LVIDs 3.20 2.1 - 4.0 cm    LV Diastolic Volume 130.68 mL    LV Diastolic Volume Index 58.86 mL/m2    IVS 1.05 0.6 - 1.1 cm    LVOT diameter 2.17 cm    LVOT area 3.7 cm2    FS 39 28 - 44 %    Left Ventricle Relative Wall Thickness 0.44 cm    Posterior Wall 1.15 (A) 0.6 - 1.1 cm    LV mass 222.15 g    LV Mass Index 100 g/m2    MV Peak E David 1.43 m/s    TDI LATERAL 0.08 m/s    TDI SEPTAL 0.05 m/s    E/E' ratio 22.00 m/s    MV Peak A David 0.67 m/s    TR Max David 4.04 m/s    E/A ratio 2.13     IVRT 114.18 msec    E wave deceleration " time 161.92 msec    LV SEPTAL E/E' RATIO 28.60 m/s    LV LATERAL E/E' RATIO 17.88 m/s    LVOT peak david 1.09 m/s    Left Ventricular Outflow Tract Mean Velocity 0.80 cm/s    Left Ventricular Outflow Tract Mean Gradient 2.86 mmHg    LA size 6.24 cm    Left Atrium Minor Axis 5.74 cm    RVDD 4.70 cm    TAPSE 2.56 cm    RA Major Axis 5.20 cm    RA Width 3.71 cm    AV regurgitation pressure 1/2 time [id number] ms    AR Max David 4.33 m/s    AV mean gradient 44 mmHg    AV peak gradient 71 mmHg    Ao peak david 4.21 m/s    Ao .20 cm    LVOT peak VTI 31.70 cm    AV valve area 1.00 cm²    AV Velocity Ratio 0.26     AV index (prosthetic) 0.27     BASIL by Velocity Ratio 0.96 cm²    MV mean gradient 3 mmHg    MV peak gradient 8 mmHg    MV stenosis pressure 1/2 time 46.96 ms    MV valve area p 1/2 method 4.68 cm2    MV valve area by continuity eq 2.86 cm2    MV VTI 41.0 cm    TV peak gradient 1 mmHg    Triscuspid Valve Regurgitation Peak Gradient 65 mmHg    PV PEAK VELOCITY 1.39 m/s    PV peak gradient 8 mmHg    Sinus 3.27 cm    STJ 2.88 cm    Ascending aorta 3.51 cm    IVC diameter 2.30 cm    Mean e' 0.07 m/s    ZLVIDS -3.05     ZLVIDD -3.96     LA Volume Index 83.0 mL/m2    LA volume 184.31 cm3    Left Atrium Major Axis 6.5 cm    LA WIDTH 5.7 cm    TV resting pulmonary artery pressure 80 mmHg    RV TB RVSP 19 mmHg    Est. RA pres 15 mmHg    Narrative      Left Ventricle: The left ventricle is normal in size. Normal wall   thickness. Normal wall motion. There is normal systolic function with a   visually estimated ejection fraction of 55 - 60%. Grade III diastolic   dysfunction.    Left Atrium: Left atrium is severely dilated.    Right Ventricle: Moderate right ventricular enlargement. Systolic   function is normal.    Right Atrium: Right atrium is severely dilated.    Aortic Valve: There is severe stenosis. Aortic valve area by VTI is   1.00 cm². Aortic valve peak velocity is 4.21 m/s. Mean gradient is 44   mmHg. The  dimensionless index is 0.27. There is moderate aortic   regurgitation.    Mitral Valve: There is moderate regurgitation.    Tricuspid Valve: There is moderate regurgitation.    Pulmonary Artery: The estimated pulmonary artery systolic pressure is   80 mmHg.    IVC/SVC: Elevated venous pressure at 15 mmHg.    1 cm x 1.4 cm mass seen in some views in LA near the MV.. recommend MARIA   if clinically indicated.

## 2023-09-21 NOTE — SUBJECTIVE & OBJECTIVE
Past Medical History:   Diagnosis Date    Acute congestive heart failure 3/20/2020    Alcohol abuse     Arthritis     Asthma attack 11/24/2021    Coronary artery disease     Diabetes mellitus     Hyperlipemia     Hypertension     Multiple thyroid nodules 6/14/2023    Rhabdomyolysis      Past Surgical History:   Procedure Laterality Date    EYE SURGERY      TRANSESOPHAGEAL ECHOCARDIOGRAM WITH POSSIBLE CARDIOVERSION (MARIA W/ POSS CARDIOVERSION) N/A 1/5/2023    Procedure: Transesophageal echo (MARIA) intra-procedure log documentation;  Surgeon: Indra Foote MD;  Location: [facility] CATH LAB;  Service: Cardiology;  Laterality: N/A;    TREATMENT OF CARDIAC ARRHYTHMIA N/A 12/7/2020    Procedure: Cardioversion or Defibrillation;  Surgeon: Indra Foote MD;  Location: [facility] CATH LAB;  Service: Cardiology;  Laterality: N/A;    TREATMENT OF CARDIAC ARRHYTHMIA N/A 12/30/2020    Procedure: Cardioversion or Defibrillation;  Surgeon: Tyrone Steen MD;  Location: [facility] CATH LAB;  Service: Cardiology;  Laterality: N/A;    TREATMENT OF CARDIAC ARRHYTHMIA N/A 1/5/2023    Procedure: Cardioversion or Defibrillation;  Surgeon: Indra Foote MD;  Location: [facility] CATH LAB;  Service: Cardiology;  Laterality: N/A;    VASCULAR SURGERY       Review of patient's allergies indicates:  No Known Allergies    Medications:  Continuous Infusions:  Scheduled Meds:   allopurinoL  100 mg Oral Daily    amiodarone  200 mg Oral Daily    atorvastatin  80 mg Oral Daily    ferrous sulfate  1 tablet Oral Daily    folic acid  1 mg Oral Daily    furosemide (LASIX) injection  40 mg Intravenous Q8H    gabapentin  100 mg Oral BID    insulin detemir U-100 (Levemir)  5 Units Subcutaneous BID    LIDOcaine  1 patch Transdermal Daily    metoprolol succinate  25 mg Oral Daily    multivitamin  1 tablet Oral Daily     PRN Meds:0.9%  NaCl infusion (for blood administration), acetaminophen, albuterol sulfate, aluminum-magnesium hydroxide-simethicone, dextrose  50%, dextrose 50%, glucagon (human recombinant), glucose, glucose, HYDROcodone-acetaminophen, insulin aspart U-100, melatonin, naloxone, ondansetron, sodium chloride 0.9%    Family History       Problem Relation (Age of Onset)    Diabetes Mother, Father, Sister    Hypertension Mother, Father, Sister          Tobacco Use    Smoking status: Never    Smokeless tobacco: Never   Substance and Sexual Activity    Alcohol use: Not Currently     Alcohol/week: 6.0 standard drinks of alcohol     Types: 6 Cans of beer per week     Comment: daily    Drug use: No    Sexual activity: Yes     Partners: Female     Review of Systems   Constitutional:  Positive for appetite change and fatigue.   Gastrointestinal:  Positive for blood in stool and constipation. Negative for nausea and vomiting. Anal bleeding: dark.  Genitourinary:  Negative for difficulty urinating.   Musculoskeletal:  Positive for gait problem (uses walker).   Skin:  Positive for wound (foot and sacral area).   Neurological:  Positive for weakness.     Objective:     Vital Signs (Most Recent):  Temp: 98.4 °F (36.9 °C) (09/21/23 0726)  Pulse: 62 (09/21/23 0726)  Resp: 20 (09/21/23 0726)  BP: (!) 121/58 (09/21/23 0726)  SpO2: 98 % (09/21/23 0726) Vital Signs (24h Range):  Temp:  [97.4 °F (36.3 °C)-98.5 °F (36.9 °C)] 98.4 °F (36.9 °C)  Pulse:  [60-65] 62  Resp:  [17-20] 20  SpO2:  [96 %-100 %] 98 %  BP: (104-129)/(55-61) 121/58     Weight: 100.4 kg (221 lb 5.5 oz)  Body mass index is 30.87 kg/m².     Physical Exam  Vitals and nursing note reviewed.   Constitutional:       General: He is not in acute distress.     Appearance: He is ill-appearing (chronic and acute).   HENT:      Head: Normocephalic and atraumatic.   Pulmonary:      Effort: Pulmonary effort is normal. No respiratory distress.   Musculoskeletal:      Right lower leg: Edema present.      Left lower leg: Edema present.   Neurological:      Mental Status: He is alert and oriented to person, place, and time.  "  Psychiatric:         Mood and Affect: Mood normal.         Thought Content: Thought content normal.        Advance Care Planning   Advance Directives:   Living Will: No    LaPOST: No    Do Not Resuscitate Status: No    Medical Power of : No (Gulshan Abraham is preferred MPOA; plan to complete MPOA doc prior to discharge)        Oral Declaration: Yes    Decision Making:  Patient answered questions  Goals of Care: The patient endorses that what is most important right now is to focus on avoiding the hospital, remaining as independent as possible, symptom/pain control, and extending life as long as possible, even it it means sacrificing quality    Accordingly, we have decided that the best plan to meet the patient's goals includes continuing with treatment     Significant Labs: All pertinent labs within the past 24 hours have been reviewed.  CBC:   Recent Labs   Lab 09/21/23  0909   WBC 8.12   HGB 7.5*   HCT 24.2*   MCV 79*        BMP:  No results for input(s): "GLU", "NA", "K", "CL", "CO2", "BUN", "CREATININE", "CALCIUM", "MG" in the last 24 hours.  LFT:  Lab Results   Component Value Date    AST 22 09/20/2023    ALKPHOS 87 09/20/2023    BILITOT 0.7 09/20/2023     Albumin:   Albumin   Date Value Ref Range Status   09/20/2023 2.1 (L) 3.5 - 5.2 g/dL Final     Protein:   Total Protein   Date Value Ref Range Status   09/20/2023 6.7 6.0 - 8.4 g/dL Final     Lactic acid:   Lab Results   Component Value Date    LACTATE 0.8 08/05/2023    LACTATE 0.7 07/30/2023     Significant Imaging: I have reviewed all pertinent imaging results/findings within the past 24 hours.    "

## 2023-09-21 NOTE — ASSESSMENT & PLAN NOTE
Darrell today.  Refer to valve Clinic at [facility] for further evaluation and management as an outpatient

## 2023-09-21 NOTE — TRANSFER OF CARE
"Anesthesia Transfer of Care Note    Patient: Chato Bowie    Procedure(s) Performed: Procedure(s) (LRB):  Transesophageal echo (MARIA) intra-procedure log documentation (N/A)  ECHOCARDIOGRAM, TRANSESOPHAGEAL (N/A)    Patient location: Telemetry/Step Down Unit    Anesthesia Type: general    Transport from OR: Transported from OR on room air with adequate spontaneous ventilation    Post pain: adequate analgesia    Post assessment: no apparent anesthetic complications and tolerated procedure well    Post vital signs: stable    Level of consciousness: awake, alert, lethargic and responds to stimulation    Nausea/Vomiting: no nausea/vomiting    Complications: none    Transfer of care protocol was followed      Last vitals:   Visit Vitals  /74 (BP Location: Right arm, Patient Position: Lying)   Pulse (!) 16   Temp 37 °C (98.6 °F) (Skin)   Resp 16   Ht 5' 11" (1.803 m)   Wt 100.4 kg (221 lb 5.5 oz)   SpO2 97%   BMI 30.87 kg/m²     "

## 2023-09-21 NOTE — ASSESSMENT & PLAN NOTE
Repeat Echo 9/20 with noted severe AS which has progressed from echo 3 months pta  Likely contributing to pts sxs    -Cardiology consulted  -benefit from outpatient evaluation for tavr  -palliative care consulted

## 2023-09-21 NOTE — ASSESSMENT & PLAN NOTE
"Advance Care Planning     Date: 09/21/2023  Pt currently full code and wants "anything that will make him feel better" 3 minutes spent on ACP  -palliative care consulted         "

## 2023-09-21 NOTE — ASSESSMENT & PLAN NOTE
- chronic history and acute worsening noted  - discussed potential trajectory of CKD and GOC regarding HD in the future; would consider HD if needed in the future   - continued management per primary team

## 2023-09-21 NOTE — ASSESSMENT & PLAN NOTE
?  Echodensity seen intrathoracic echo.  Artifact versus mass.  Will do MARIA today for further evaluation.    -No absolute contraindications of esophageal stricture, tumor, perforation, laceration,or diverticulum and/or active GI bleed  -The risks, benefits & alternatives of the procedure were explained to the patient.   -The risks of transesophageal echo include but are not limited to:  Dental trauma, esophageal trauma/perforation, bleeding, laryngospasm/brochospasm, aspiration, sore throat/hoarseness, & dislodgement of the endotracheal tube/nasogastric tube (where applicable).    -The risks of ANES monitored sedation include hypotension, respiratory depression, arrhythmias, bronchospasm, & death.    -Informed consent was obtained. The patient is agreeable to proceed with the procedure and all questions and concerns addressed.

## 2023-09-21 NOTE — CONSULTS
[facility] - Telemetry  Cardiology  Consult Note    Patient Name: Chato Bowie  MRN: [id number]  Admission Date: 9/19/2023  Hospital Length of Stay: 0 days  Code Status: Full Code   Attending Provider: Jose L Evans III, MD   Consulting Provider: Luisana Rodríguez MD  Primary Care Physician: Irlanda Valenzuela -  Principal Problem:Chest pain    Patient information was obtained from patient and ER records.     Inpatient consult to Cardiology  Consult performed by: Luisana Rodríguez MD  Consult ordered by: Jose L Evans III, MD        Subjective:     Chief Complaint:  shabnam     HPI:   Patient is a pleasant 72-year-old man.  Past medical history of atrial fibrillation status post cardioversion earlier this year.  Currently in sinus rhythm.  Came in with some chest tightness and shortness of breath.  Chest tightness better after sublingual nitroglycerin.  Now chest pain-free.  Also has acute kidney injury.  Echo done yesterday demonstrated normal EF with grade 3 diastolic dysfunction.  There was echodensity seen in the left atria.  Cardiology has been consulted for further evaluation of that.  .        Left Ventricle: The left ventricle is normal in size. Normal wall thickness. Normal wall motion. There is normal systolic function with a visually estimated ejection fraction of 55 - 60%. Grade III diastolic dysfunction.    Left Atrium: Left atrium is severely dilated.    Right Ventricle: Moderate right ventricular enlargement. Systolic function is normal.    Right Atrium: Right atrium is severely dilated.    Aortic Valve: There is severe stenosis. Aortic valve area by VTI is 1.00 cm². Aortic valve peak velocity is 4.21 m/s. Mean gradient is 44 mmHg. The dimensionless index is 0.27. There is moderate aortic regurgitation.    Mitral Valve: There is moderate regurgitation.    Tricuspid Valve: There is moderate regurgitation.    Pulmonary Artery: The estimated pulmonary artery systolic pressure is 80 mmHg.    IVC/SVC:  "Elevated venous pressure at 15 mmHg.    1 cm x 1.4 cm mass seen in some views in [place] near the MV.. recommend MARIA if clinically indicated.           HPI: 72-year-old  male with medical history significant for congestive heart failure, coronary artery disease, type 2 diabetes mellitus, chronic kidney disease stage 4, hypertension, hyperlipidemia, multiple thyroid nodule and alcohol use disorder in remission was transferred from [facility] ED after presenting with chest pain of 1 hour duration, chest pain was said to be insidious in onset, non-exertional, as patient just finished breakfast and was watching tv when he noticed chest pain across his chest, described as stabbing/someone pounding on my chest, chest pain was nonradiating, said to be 10/10 at onset now 9/10, denied diaphoresis, no nausea, no vomiting although associated with palpitation.  He denied any cough, no change in baseline orthopnea of 2 pillows, no paroxysmal nocturnal dyspnea, although has 2+ of bilateral pitting pedal edema he denied fever, chills, rigors, denied urinary symptoms of dysuria, frequency, urgency, straining at micturition or hematuria.  No change in bowel habits, denied diarrhea, and constipation.  No previous history of tobacco use, voiced quitting alcohol sometimes back.  At presention to the outside hospital lab was significant for hypokalemia of 3.1, BUN creatinine of 69/3.5, with a baseline of 2.1-2.5, H&H 7.4/23.5.  Troponin was 0.053-0.055-0.056 essentially flat similar to previous, be in [facility] inpatient in the 1100, most recent prior to this was 487 in July 27, 2023.  CT abdomen and pelvis without contrast showed "bilateral pleural effusion diffuse body wall edema, smooth interlobular septal thickening which may relate to vascular congestion/mild interstitial edema".Pain was mildly improved with aspirin and nitro SG.Had similar chest pain in the past as well.      Past Medical History:   Diagnosis Date    " Acute congestive heart failure 3/20/2020    Alcohol abuse     Arthritis     Asthma attack 11/24/2021    Coronary artery disease     Diabetes mellitus     Hyperlipemia     Hypertension     Multiple thyroid nodules 6/14/2023    Rhabdomyolysis        Past Surgical History:   Procedure Laterality Date    EYE SURGERY      TRANSESOPHAGEAL ECHOCARDIOGRAM WITH POSSIBLE CARDIOVERSION (MARIA W/ POSS CARDIOVERSION) N/A 1/5/2023    Procedure: Transesophageal echo (MARIA) intra-procedure log documentation;  Surgeon: Indra Foote MD;  Location: [facility] CATH LAB;  Service: Cardiology;  Laterality: N/A;    TREATMENT OF CARDIAC ARRHYTHMIA N/A 12/7/2020    Procedure: Cardioversion or Defibrillation;  Surgeon: Indra Foote MD;  Location: [facility] CATH LAB;  Service: Cardiology;  Laterality: N/A;    TREATMENT OF CARDIAC ARRHYTHMIA N/A 12/30/2020    Procedure: Cardioversion or Defibrillation;  Surgeon: Tyrone Steen MD;  Location: [facility] CATH LAB;  Service: Cardiology;  Laterality: N/A;    TREATMENT OF CARDIAC ARRHYTHMIA N/A 1/5/2023    Procedure: Cardioversion or Defibrillation;  Surgeon: Indra Foote MD;  Location: [facility] CATH LAB;  Service: Cardiology;  Laterality: N/A;    VASCULAR SURGERY         Review of patient's allergies indicates:  No Known Allergies    No current facility-administered medications on file prior to encounter.     Current Outpatient Medications on File Prior to Encounter   Medication Sig    albuterol (PROVENTIL/VENTOLIN HFA) 90 mcg/actuation inhaler Inhale 2 puffs into the lungs every 6 (six) hours as needed for Wheezing or Shortness of Breath.    allopurinoL (ZYLOPRIM) 100 MG tablet Take 1 tablet (100 mg total) by mouth once daily.    amiodarone (PACERONE) 200 MG Tab Take 200 mg by mouth once daily.    amLODIPine (NORVASC) 10 MG tablet Take 1 tablet (10 mg total) by mouth once daily.    ascorbic acid, vitamin C, (VITAMIN C) 500 MG tablet Take 500 mg by mouth 2 (two) times daily.     benzonatate (TESSALON) 100 MG capsule Take 100 mg by mouth 3 (three) times daily as needed for Cough.    ELIQUIS 5 mg Tab Take 5 mg by mouth 2 (two) times daily.    ferrous sulfate (FEOSOL) 325 mg (65 mg iron) Tab tablet Take 325 mg by mouth once daily.    folic acid (FOLVITE) 1 MG tablet Take 1 tablet (1 mg total) by mouth once daily.    furosemide (LASIX) 80 MG tablet Take 1 tablet (80 mg total) by mouth 2 (two) times daily.    gabapentin (NEURONTIN) 100 MG capsule Take 1 capsule (100 mg total) by mouth 2 (two) times daily.    hydrALAZINE (APRESOLINE) 100 MG tablet Take 1 tablet (100 mg total) by mouth every 8 (eight) hours.    HYDROcodone-acetaminophen (NORCO) 5-325 mg per tablet Take 1 tablet by mouth every 8 (eight) hours as needed for Pain.    insulin detemir U-100, Levemir, 100 unit/mL (3 mL) SubQ InPn pen Inject 7 Units into the skin 2 (two) times daily.    LIDOcaine (LIDODERM) 5 % Place 2 patches onto the skin once daily. Remove & Discard patch within 12 hours or as directed by MD    metoprolol succinate (TOPROL-XL) 25 MG 24 hr tablet Take 25 mg by mouth once daily.    multivitamin (ONE DAILY MULTIVITAMIN) per tablet Take 1 tablet by mouth once daily.    potassium chloride (KLOR-CON) 10 MEQ TbSR Take 10 mEq by mouth once daily.    rosuvastatin (CRESTOR) 40 MG Tab Take 40 mg by mouth every evening.    tamsulosin (FLOMAX) 0.4 mg Cap Take 1 capsule (0.4 mg total) by mouth once daily.     Family History       Problem Relation (Age of Onset)    Diabetes Mother, Father, Sister    Hypertension Mother, Father, Sister          Tobacco Use    Smoking status: Never    Smokeless tobacco: Never   Substance and Sexual Activity    Alcohol use: Not Currently     Alcohol/week: 6.0 standard drinks of alcohol     Types: 6 Cans of beer per week     Comment: daily    Drug use: No    Sexual activity: Yes     Partners: Female     Review of Systems   Constitutional: Negative.   HENT: Negative.     Eyes:  Negative.    Cardiovascular:  Positive for chest pain and dyspnea on exertion.   Respiratory: Negative.     Endocrine: Negative.    Hematologic/Lymphatic: Negative.    Skin: Negative.    Musculoskeletal: Negative.    Gastrointestinal: Negative.    Genitourinary: Negative.    Neurological: Negative.    Psychiatric/Behavioral: Negative.     Allergic/Immunologic: Negative.      Objective:     Vital Signs (Most Recent):  Temp: 98.1 °F (36.7 °C) (09/21/23 1127)  Pulse: 62 (09/21/23 1128)  Resp: 20 (09/21/23 1127)  BP: 138/63 (09/21/23 1128)  SpO2: 100 % (09/21/23 1127) Vital Signs (24h Range):  Temp:  [97.4 °F (36.3 °C)-98.5 °F (36.9 °C)] 98.1 °F (36.7 °C)  Pulse:  [60-65] 62  Resp:  [17-20] 20  SpO2:  [96 %-100 %] 100 %  BP: (104-138)/(55-63) 138/63     Weight: 100.4 kg (221 lb 5.5 oz)  Body mass index is 30.87 kg/m².    SpO2: 100 %         Intake/Output Summary (Last 24 hours) at 9/21/2023 1248  Last data filed at 9/21/2023 1127  Gross per 24 hour   Intake 413.33 ml   Output 2100 ml   Net -1686.67 ml       Lines/Drains/Airways       Drain  Duration             Male External Urinary Catheter 09/20/23 1705 Other (Comment) <1 day              Peripheral Intravenous Line  Duration                  Peripheral IV - Single Lumen 09/19/23 1213 20 G Left Antecubital 2 days                     Physical Exam  Vitals reviewed.   Constitutional:       Appearance: He is well-developed.   HENT:      Head: Normocephalic.   Eyes:      Conjunctiva/sclera: Conjunctivae normal.      Pupils: Pupils are equal, round, and reactive to light.   Cardiovascular:      Rate and Rhythm: Normal rate and regular rhythm.      Heart sounds: Normal heart sounds.   Pulmonary:      Effort: Pulmonary effort is normal.      Breath sounds: Normal breath sounds.   Abdominal:      General: Bowel sounds are normal.      Palpations: Abdomen is soft.   Musculoskeletal:      Cervical back: Normal range of motion and neck supple.   Skin:     General: Skin is warm.  "  Neurological:      Mental Status: He is alert and oriented to person, place, and time.          Significant Labs: BMP:   Recent Labs   Lab 09/20/23  0446 09/21/23  0909   GLU 80 86    138   K 3.2* 2.9*    99   CO2 25 26   BUN 76* 74*   CREATININE 3.5* 3.4*   CALCIUM 9.1 9.0   MG 2.2  --    , CMP   Recent Labs   Lab 09/20/23  0446 09/21/23  0909    138   K 3.2* 2.9*    99   CO2 25 26   GLU 80 86   BUN 76* 74*   CREATININE 3.5* 3.4*   CALCIUM 9.1 9.0   PROT 6.7  --    ALBUMIN 2.1*  --    BILITOT 0.7  --    ALKPHOS 87  --    AST 22  --    ALT 20  --    ANIONGAP 12 13   , CBC   Recent Labs   Lab 09/20/23  0446 09/21/23  0909   WBC 8.57 8.12   HGB 6.4* 7.5*   HCT 22.0* 24.2*    260   , INR No results for input(s): "INR", "PROTIME" in the last 48 hours., Lipid Panel   Recent Labs   Lab 09/20/23  0446   CHOL 78*   HDL 24*   LDLCALC 43.6*   TRIG 52   CHOLHDL 30.8   , Troponin   Recent Labs   Lab 09/19/23  1508 09/20/23  0446   TROPONINI 0.056* 0.051*   , and All pertinent lab results from the last 24 hours have been reviewed.    Significant Imaging: Echocardiogram: Transthoracic echo (TTE) complete (Cupid Only):   Results for orders placed or performed during the hospital encounter of 09/19/23   Echo   Result Value Ref Range    BSA 2.27 m2    LVOT stroke volume 117.18 cm3    LVIDd 5.22 3.5 - 6.0 cm    LV Systolic Volume 40.81 mL    LV Systolic Volume Index 18.4 mL/m2    LVIDs 3.20 2.1 - 4.0 cm    LV Diastolic Volume 130.68 mL    LV Diastolic Volume Index 58.86 mL/m2    IVS 1.05 0.6 - 1.1 cm    LVOT diameter 2.17 cm    LVOT area 3.7 cm2    FS 39 28 - 44 %    Left Ventricle Relative Wall Thickness 0.44 cm    Posterior Wall 1.15 (A) 0.6 - 1.1 cm    LV mass 222.15 g    LV Mass Index 100 g/m2    MV Peak E David 1.43 m/s    TDI LATERAL 0.08 m/s    TDI SEPTAL 0.05 m/s    E/E' ratio 22.00 m/s    MV Peak A David 0.67 m/s    TR Max David 4.04 m/s    E/A ratio 2.13     IVRT 114.18 msec    E wave deceleration " time 161.92 msec    LV SEPTAL E/E' RATIO 28.60 m/s    LV LATERAL E/E' RATIO 17.88 m/s    LVOT peak david 1.09 m/s    Left Ventricular Outflow Tract Mean Velocity 0.80 cm/s    Left Ventricular Outflow Tract Mean Gradient 2.86 mmHg    LA size 6.24 cm    Left Atrium Minor Axis 5.74 cm    RVDD 4.70 cm    TAPSE 2.56 cm    RA Major Axis 5.20 cm    RA Width 3.71 cm    AV regurgitation pressure 1/2 time [id number] ms    AR Max David 4.33 m/s    AV mean gradient 44 mmHg    AV peak gradient 71 mmHg    Ao peak david 4.21 m/s    Ao .20 cm    LVOT peak VTI 31.70 cm    AV valve area 1.00 cm²    AV Velocity Ratio 0.26     AV index (prosthetic) 0.27     BASIL by Velocity Ratio 0.96 cm²    MV mean gradient 3 mmHg    MV peak gradient 8 mmHg    MV stenosis pressure 1/2 time 46.96 ms    MV valve area p 1/2 method 4.68 cm2    MV valve area by continuity eq 2.86 cm2    MV VTI 41.0 cm    TV peak gradient 1 mmHg    Triscuspid Valve Regurgitation Peak Gradient 65 mmHg    PV PEAK VELOCITY 1.39 m/s    PV peak gradient 8 mmHg    Sinus 3.27 cm    STJ 2.88 cm    Ascending aorta 3.51 cm    IVC diameter 2.30 cm    Mean e' 0.07 m/s    ZLVIDS -3.05     ZLVIDD -3.96     LA Volume Index 83.0 mL/m2    LA volume 184.31 cm3    Left Atrium Major Axis 6.5 cm    LA WIDTH 5.7 cm    TV resting pulmonary artery pressure 80 mmHg    RV TB RVSP 19 mmHg    Est. RA pres 15 mmHg    Narrative      Left Ventricle: The left ventricle is normal in size. Normal wall   thickness. Normal wall motion. There is normal systolic function with a   visually estimated ejection fraction of 55 - 60%. Grade III diastolic   dysfunction.    Left Atrium: Left atrium is severely dilated.    Right Ventricle: Moderate right ventricular enlargement. Systolic   function is normal.    Right Atrium: Right atrium is severely dilated.    Aortic Valve: There is severe stenosis. Aortic valve area by VTI is   1.00 cm². Aortic valve peak velocity is 4.21 m/s. Mean gradient is 44    mmHg. The dimensionless index is 0.27. There is moderate aortic   regurgitation.    Mitral Valve: There is moderate regurgitation.    Tricuspid Valve: There is moderate regurgitation.    Pulmonary Artery: The estimated pulmonary artery systolic pressure is   80 mmHg.    IVC/SVC: Elevated venous pressure at 15 mmHg.    1 cm x 1.4 cm mass seen in some views in [place] near the MV.. recommend MARIA   if clinically indicated.       Assessment and Plan:     * Chest pain  Now chest pain-free.  Not a candidate for cardiac catheterization at the current time because of acute kidney injury.  Continue medical management    Left atrial mass  ?  Echodensity seen intrathoracic echo.  Artifact versus mass.  Will do MARIA today for further evaluation.    -No absolute contraindications of esophageal stricture, tumor, perforation, laceration,or diverticulum and/or active GI bleed  -The risks, benefits & alternatives of the procedure were explained to the patient.   -The risks of transesophageal echo include but are not limited to:  Dental trauma, esophageal trauma/perforation, bleeding, laryngospasm/brochospasm, aspiration, sore throat/hoarseness, & dislodgement of the endotracheal tube/nasogastric tube (where applicable).    -The risks of ANES monitored sedation include hypotension, respiratory depression, arrhythmias, bronchospasm, & death.    -Informed consent was obtained. The patient is agreeable to proceed with the procedure and all questions and concerns addressed.      Multiple wounds        Acute on chronic diastolic congestive heart failure  Now euvolemic.    Echo    Interpretation Summary    Left Ventricle: The left ventricle is normal in size. Normal wall thickness. Normal wall motion. There is normal systolic function with a visually estimated ejection fraction of 55 - 60%. Grade III diastolic dysfunction.    Left Atrium: Left atrium is severely dilated.    Right Ventricle: Moderate right ventricular enlargement.  Systolic function is normal.    Right Atrium: Right atrium is severely dilated.    Aortic Valve: There is severe stenosis. Aortic valve area by VTI is 1.00 cm². Aortic valve peak velocity is 4.21 m/s. Mean gradient is 44 mmHg. The dimensionless index is 0.27. There is moderate aortic regurgitation.    Mitral Valve: There is moderate regurgitation.    Tricuspid Valve: There is moderate regurgitation.    Pulmonary Artery: The estimated pulmonary artery systolic pressure is 80 mmHg.    IVC/SVC: Elevated venous pressure at 15 mmHg.    1 cm x 1.4 cm mass seen in some views in [place] near the MV.. recommend DARRELL if clinically indicated.    Recent Labs   Lab 09/19/23  1212   BNP 1,100*   .    Paroxysmal atrial flutter  On oral anticoagulation with Xarelto at home.  It has been held because of anemia requiring 1 blood transfusion yesterday.  GI has been involved by primary team.  Resume anticoagulation if/when cleared by GI.  Or primary team    Severe aortic valve stenosis  Darrell today.  Refer to valve Clinic at [facility] for further evaluation and management as an outpatient    Alcohol abuse        Type 2 diabetes mellitus with kidney complication, with long-term current use of insulin        Dyslipidemia        Acute kidney injury superimposed on CKD            VTE Risk Mitigation (From admission, onward)         Ordered     IP VTE HIGH RISK PATIENT  Once         09/21/23 1018     Place sequential compression device  Until discontinued         09/21/23 1018     Place sequential compression device  Until discontinued         09/20/23 0013                Thank you for your consult. I will follow-up with patient. Please contact us if you have any additional questions.    Luisana Rodríguez MD  Cardiology   [facility]

## 2023-09-21 NOTE — ANESTHESIA PREPROCEDURE EVALUATION
09/21/2023  Chato Bowie is a 72 y.o., male.      Pre-op Assessment    I have reviewed the Patient Summary Reports.     I have reviewed the Nursing Notes.    I have reviewed the Medications.     Review of Systems  Anesthesia Hx:  Denies Family Hx of Anesthesia complications.   Denies Personal Hx of Anesthesia complications.        Patient Active Problem List   Diagnosis    Abnormal EKG    Epigastric abdominal pain    Sepsis    Intractable abdominal pain    Fever    Intractable vomiting with nausea    Acute kidney injury superimposed on CKD    Essential hypertension    Dyslipidemia    Type 2 diabetes mellitus with kidney complication, with long-term current use of insulin    Anemia    GERD (gastroesophageal reflux disease)    Hypokalemia    Alcohol abuse    Left knee pain    NSTEMI (non-ST elevated myocardial infarction)    Hypomagnesemia    BPH (benign prostatic hyperplasia)    Severe aortic valve stenosis    Chest pain    Paroxysmal atrial flutter    Acute on chronic diastolic congestive heart failure    Elevated serum creatinine    CKD (chronic kidney disease), stage IV    Fracture of one rib, right side, initial encounter for closed fracture    Acute on chronic diastolic (congestive) heart failure    UTI (urinary tract infection)    Anasarca    acute on chronic diastolic heart failure    Acute hypoxemic respiratory failure    Severe malnutrition    Acute gout of left knee    Debility Due to left knee pain and effusion due to gout? and/or injury    DJD (degenerative joint disease)    Multiple thyroid nodules    Overweight (BMI 25.0-29.9)    Elevated troponin    Skin rash    Acute encephalopathy    Falls    COVID-19 virus infection    Multiple wounds    Effusion of left knee    Discharge planning issues    ACP (advance care planning)    Left atrial mass        Past Medical History:   Diagnosis Date    Acute congestive heart failure 3/20/2020    Alcohol abuse     Arthritis     Asthma attack 11/24/2021    Coronary artery disease     Diabetes mellitus     Hyperlipemia     Hypertension     Multiple thyroid nodules 6/14/2023    Rhabdomyolysis        ECHO: Results for orders placed during the hospital encounter of 09/19/23    Echo    Interpretation Summary    Left Ventricle: The left ventricle is normal in size. Normal wall thickness. Normal wall motion. There is normal systolic function with a visually estimated ejection fraction of 55 - 60%. Grade III diastolic dysfunction.    Left Atrium: Left atrium is severely dilated.    Right Ventricle: Moderate right ventricular enlargement. Systolic function is normal.    Right Atrium: Right atrium is severely dilated.    Aortic Valve: There is severe stenosis. Aortic valve area by VTI is 1.00 cm². Aortic valve peak velocity is 4.21 m/s. Mean gradient is 44 mmHg. The dimensionless index is 0.27. There is moderate aortic regurgitation.    Mitral Valve: There is moderate regurgitation.    Tricuspid Valve: There is moderate regurgitation.    Pulmonary Artery: The estimated pulmonary artery systolic pressure is 80 mmHg.    IVC/SVC: Elevated venous pressure at 15 mmHg.    1 cm x 1.4 cm mass seen in some views in [place] near the MV.. recommend MARIA if clinically indicated.      Body mass index is 30.87 kg/m².    Tobacco Use: Low Risk  (9/21/2023)    Patient History     Smoking Tobacco Use: Never     Smokeless Tobacco Use: Never     Passive Exposure: Not on file       Social History     Substance and Sexual Activity   Drug Use No        Alcohol Use: Not At Risk (6/15/2023)    AUDIT-C     Frequency of Alcohol Consumption: Never     Average Number of Drinks: Patient does not drink     Frequency of Binge Drinking: Never       Review of patient's allergies indicates:  No Known Allergies      Airway:  No value filed.      Physical Exam    Airway:  Mouth Opening: Normal  TM Distance: Normal      Dental:  Periodontal disease        Anesthesia Plan  Type of Anesthesia, risks & benefits discussed:    Anesthesia Type: Gen Natural Airway  Intra-op Monitoring Plan: Standard ASA Monitors  Post Op Pain Control Plan: multimodal analgesia  Induction:  IV  Informed Consent: Informed consent signed with the Patient and all parties understand the risks and agree with anesthesia plan.  All questions answered.   ASA Score: 4  Day of Surgery Review of History & Physical: H&P Update referred to the surgeon/provider.    Ready For Surgery From Anesthesia Perspective.     .

## 2023-09-21 NOTE — ASSESSMENT & PLAN NOTE
On oral anticoagulation with Xarelto at home.  It has been held because of anemia requiring 1 blood transfusion yesterday.  GI has been involved by primary team.  Resume anticoagulation if/when cleared by GI.  Or primary team

## 2023-09-21 NOTE — ASSESSMENT & PLAN NOTE
Patient with acute kidney injury/acute renal failure likely due to pre-renal azotemia from cardiorenal syndrome  History of diastolic heart failure with BNP 1100, prior to this was 487  Expecting improvement with diuresis  Strict input and output  ELIZABETH is currently stable. Baseline creatinine 2.1 to 2.5 - Labs reviewed- Renal function/electrolytes with Estimated Creatinine Clearance: 23.7 mL/min (A) (based on SCr of 3.4 mg/dL (H)). according to latest data.  Monitor urine output and serial BMP and adjust therapy as needed.   Avoid nephrotoxins and renally dose meds for GFR listed above.

## 2023-09-21 NOTE — ASSESSMENT & PLAN NOTE
Now euvolemic.    Echo    Interpretation Summary    Left Ventricle: The left ventricle is normal in size. Normal wall thickness. Normal wall motion. There is normal systolic function with a visually estimated ejection fraction of 55 - 60%. Grade III diastolic dysfunction.    Left Atrium: Left atrium is severely dilated.    Right Ventricle: Moderate right ventricular enlargement. Systolic function is normal.    Right Atrium: Right atrium is severely dilated.    Aortic Valve: There is severe stenosis. Aortic valve area by VTI is 1.00 cm². Aortic valve peak velocity is 4.21 m/s. Mean gradient is 44 mmHg. The dimensionless index is 0.27. There is moderate aortic regurgitation.    Mitral Valve: There is moderate regurgitation.    Tricuspid Valve: There is moderate regurgitation.    Pulmonary Artery: The estimated pulmonary artery systolic pressure is 80 mmHg.    IVC/SVC: Elevated venous pressure at 15 mmHg.    1 cm x 1.4 cm mass seen in some views in [place] near the MV.. recommend MARIA if clinically indicated.    Recent Labs   Lab 09/19/23  1212   BNP 1,100*   .

## 2023-09-21 NOTE — ASSESSMENT & PLAN NOTE
He presented with serum potassium of 3.1   Likely complicating diuresis    We will replete serum potassium continue to monitor with bmp and replace prn

## 2023-09-21 NOTE — CONSULTS
[facility] - Telemetry  Palliative Medicine  Consult Note    Patient Name: Chato Bowie  MRN: [id number]  Admission Date: 9/19/2023  Hospital Length of Stay: 0 days  Code Status: Full Code   Attending Provider: Jose L Evans III, MD  Consulting Provider: Veena Gilliam NP  Primary Care Physician: Irlanda Valenzuela -  Principal Problem:Chest pain    Patient information was obtained from patient, past medical records, ER records and primary team.      Inpatient consult to Palliative Care  Consult performed by: Veena Gilliam NP  Consult ordered by: Jose L Evans III, MD  Reason for consult: goals of care and advanced care planning         Assessment/Plan:   Advance Care Planning     Palliative Care  ACP (advance care planning)  9/21/2023 - Consult   - consult received; interval chart reviewed in detail; palliative team dicussed pt with hospital primary, Dr. Evans   - met with patient at bedside; introduction to palliative medicine team and role in current care and admission   - learned more about pt outside of current admission; he lives independently at home; requires walker for ambulation and prior to recently hasn't had difficulty performing ADLs independently at home (such as prepare simple meals and light chores), but got increasingly difficult due to weakness which lead to current admission   - niece Leigh, is pt's preferred HCPOA if he were to lack capacity in the future; denies spouse, children, or living siblings; offered completion of HCPOA documentation prior to discharge which pt wishes to complete   - GOC/ACP discussion; pt's overall GOC is continued full treatment and to extend life as long as possible as long as he continues to have a good quality of life; shares he would have to think further about wishes if things were to get worse or if he becomes bed bound; discussed different scenarios to consider and to discuss with preferred HCPOA, Leigh, expressing importance of her  "understanding his wishes   - discussed full code status, which does align with pt's wishes currently  - pt shared his concern for the continued "bad news" he continues to receive about his health; pt seems to have limited understanding of the details of some of his co-morbidities but seems to understand general/large concepts of his conditions; attempted to review concepts of pt's conditions related to anemia, CHF, and kidney function; encouraged questions to specialists to help clarify and understand his diagnoses   - would benefit from continued outpatient palliative involvement for further understanding multiple co-morbidities/life-limiting conditions with either palliative clinic or home palliative program   - plan to further discuss palliative follow up preference and complete MPOA form with pt in follow  - offered to call pt's nieceKatiuskaLeigh, for update , which was attempted but unable to reach; will continued to attempt to reach   - emotional support provided   - Allowed time for questions/concerns; all addressed; expressed availability of myself/palliative team for additional questions/concerns     Cardiac/Vascular  Acute on chronic diastolic congestive heart failure  - 9/19 Echo: EF 55-60%, Grade III diastolic dysfunction, pulm HTN  - edema on admit, diuresing per primary team management    - discussed trajectory of life-limiting condition  - cardiology following with planned MARIA for further eval of mass seen on echo   - continued management per primary team      Severe aortic valve stenosis  - noted; awaiting MARIA today  - cardiology following and recommending follow up in valve clinic   - continued management per primary team     Typical atrial flutter-resolved as of 9/21/2023  - on eliquis at home  - noted; continued management per primary team     Renal/  Acute kidney injury superimposed on CKD  - chronic history and acute worsening noted  - discussed potential trajectory of CKD and GOC regarding HD in " "the future; would consider HD if needed in the future   - continued management per primary team     Oncology  Anemia  - also with melena; GI following with eval for IP vs OP endoscopy   - on eliquis at home for a flutter, held on admit for potential bleeding   - required pRBC this admission; hgb currently >7 following transfusion   - continued management per primary team     Endocrine  Type 2 diabetes mellitus with kidney complication, with long-term current use of insulin  - chronic history noted; continued management per primary teams    Orthopedic  Multiple wounds  - sacral and foot wound noted; complicated by T2DM   - wound care following; continued management per wound care and primary teams     Thank you for your consult. I will follow-up with patient. Please contact us if you have any additional questions.     Total visit time: 90 minutes    > 50% of  70  min visit spent in chart review, face to face discussion of symptom assessment, coordination of care with other specialists, documentation, and discharge planning.    20 min ACP time spent: goals of care, emotional support, formulating and communicating prognosis, exploring burden/ benefit of various approaches of treatment.     Veena Gilliam NP  Palliative Medicine  [facility]  Subjective:     HPI:   From H&P: " 72-year-old  male with medical history significant for congestive heart failure, coronary artery disease, type 2 diabetes mellitus, chronic kidney disease stage 4, hypertension, hyperlipidemia, multiple thyroid nodule and alcohol use disorder in remission was transferred [facility] ED after presenting with chest pain of 1 hour duration, chest pain was said to be insidious in onset, non-exertional, as patient just finished breakfast and was watching tv when he noticed chest pain across his chest, described as stabbing/someone pounding on my chest, chest pain was nonradiating, said to be 10/10 " "at onset now 9/10, denied diaphoresis, no nausea, no vomiting although associated with palpitation.  He denied any cough, no change in baseline orthopnea of 2 pillows, no paroxysmal nocturnal dyspnea, although has 2+ of bilateral pitting pedal edema he denied fever, chills, rigors, denied urinary symptoms of dysuria, frequency, urgency, straining at micturition or hematuria.  No change in bowel habits, denied diarrhea, and constipation.  No previous history of tobacco use, voiced quitting alcohol sometimes back.  At presention to the outside hospital lab was significant for hypokalemia of 3.1, BUN creatinine of 69/3.5, with a baseline of 2.1-2.5, H&H 7.4/23.5.  Troponin was 0.053-0.055-0.056 essentially flat similar to previous, be in [facility] inpatient in the [facility], most recent prior to this was 487 in July 27, 2023.  CT abdomen and pelvis without contrast showed "bilateral pleural effusion diffuse body wall edema, smooth interlobular septal thickening which may relate to vascular congestion/mild interstitial edema".Pain was mildly improved with aspirin and nitro SG.Had similar chest pain in the past as well."    Palliative medicine consulted for goals of care discussion and advance care planning; for details of visit, see advance care planning section of plan.         Hospital Course:  No notes on file    Past Medical History:   Diagnosis Date    Acute congestive heart failure 3/20/2020    Alcohol abuse     Arthritis     Asthma attack 11/24/2021    Coronary artery disease     Diabetes mellitus     Hyperlipemia     Hypertension     Multiple thyroid nodules 6/14/2023    Rhabdomyolysis      Past Surgical History:   Procedure Laterality Date    EYE SURGERY      TRANSESOPHAGEAL ECHOCARDIOGRAM WITH POSSIBLE CARDIOVERSION (MARIA W/ POSS CARDIOVERSION) N/A 1/5/2023    Procedure: Transesophageal echo (MARIA) intra-procedure log documentation;  Surgeon: Indra Foote MD;  Location: [facility] CATH LAB;  Service: " Cardiology;  Laterality: N/A;    TREATMENT OF CARDIAC ARRHYTHMIA N/A 12/7/2020    Procedure: Cardioversion or Defibrillation;  Surgeon: Indra Foote MD;  Location: [facility] CATH LAB;  Service: Cardiology;  Laterality: N/A;    TREATMENT OF CARDIAC ARRHYTHMIA N/A 12/30/2020    Procedure: Cardioversion or Defibrillation;  Surgeon: Tyrone Steen MD;  Location: [facility] CATH LAB;  Service: Cardiology;  Laterality: N/A;    TREATMENT OF CARDIAC ARRHYTHMIA N/A 1/5/2023    Procedure: Cardioversion or Defibrillation;  Surgeon: Indra Foote MD;  Location: [facility] CATH LAB;  Service: Cardiology;  Laterality: N/A;    VASCULAR SURGERY       Review of patient's allergies indicates:  No Known Allergies    Medications:  Continuous Infusions:  Scheduled Meds:   allopurinoL  100 mg Oral Daily    amiodarone  200 mg Oral Daily    atorvastatin  80 mg Oral Daily    ferrous sulfate  1 tablet Oral Daily    folic acid  1 mg Oral Daily    furosemide (LASIX) injection  40 mg Intravenous Q8H    gabapentin  100 mg Oral BID    insulin detemir U-100 (Levemir)  5 Units Subcutaneous BID    LIDOcaine  1 patch Transdermal Daily    metoprolol succinate  25 mg Oral Daily    multivitamin  1 tablet Oral Daily     PRN Meds:0.9%  NaCl infusion (for blood administration), acetaminophen, albuterol sulfate, aluminum-magnesium hydroxide-simethicone, dextrose 50%, dextrose 50%, glucagon (human recombinant), glucose, glucose, HYDROcodone-acetaminophen, insulin aspart U-100, melatonin, naloxone, ondansetron, sodium chloride 0.9%    Family History       Problem Relation (Age of Onset)    Diabetes Mother, Father, Sister    Hypertension Mother, Father, Sister          Tobacco Use    Smoking status: Never    Smokeless tobacco: Never   Substance and Sexual Activity    Alcohol use: Not Currently     Alcohol/week: 6.0 standard drinks of alcohol     Types: 6 Cans of beer per week     Comment: daily    Drug use: No    Sexual activity: Yes      Partners: Female     Review of Systems   Constitutional:  Positive for appetite change and fatigue.   Gastrointestinal:  Positive for blood in stool and constipation. Negative for nausea and vomiting. Anal bleeding: dark.  Genitourinary:  Negative for difficulty urinating.   Musculoskeletal:  Positive for gait problem (uses walker).   Skin:  Positive for wound (foot and sacral area).   Neurological:  Positive for weakness.     Objective:     Vital Signs (Most Recent):  Temp: 98.4 °F (36.9 °C) (09/21/23 0726)  Pulse: 62 (09/21/23 0726)  Resp: 20 (09/21/23 0726)  BP: (!) 121/58 (09/21/23 0726)  SpO2: 98 % (09/21/23 0726) Vital Signs (24h Range):  Temp:  [97.4 °F (36.3 °C)-98.5 °F (36.9 °C)] 98.4 °F (36.9 °C)  Pulse:  [60-65] 62  Resp:  [17-20] 20  SpO2:  [96 %-100 %] 98 %  BP: (104-129)/(55-61) 121/58     Weight: 100.4 kg (221 lb 5.5 oz)  Body mass index is 30.87 kg/m².     Physical Exam  Vitals and nursing note reviewed.   Constitutional:       General: He is not in acute distress.     Appearance: He is ill-appearing (chronic and acute).   HENT:      Head: Normocephalic and atraumatic.   Pulmonary:      Effort: Pulmonary effort is normal. No respiratory distress.   Musculoskeletal:      Right lower leg: Edema present.      Left lower leg: Edema present.   Neurological:      Mental Status: He is alert and oriented to person, place, and time.   Psychiatric:         Mood and Affect: Mood normal.         Thought Content: Thought content normal.        Advance Care Planning  Advance Directives:   Living Will: No    LaPOST: No    Do Not Resuscitate Status: No    Medical Power of : No (Nimonica Abraham is preferred MPOA; plan to complete MPOA doc prior to discharge)        Oral Declaration: Yes    Decision Making:  Patient answered questions  Goals of Care: The patient endorses that what is most important right now is to focus on avoiding the hospital, remaining as independent as possible, symptom/pain control, and  "extending life as long as possible, even it it means sacrificing quality    Accordingly, we have decided that the best plan to meet the patient's goals includes continuing with treatment     Significant Labs: All pertinent labs within the past 24 hours have been reviewed.  CBC:   Recent Labs   Lab 09/21/23  0909   WBC 8.12   HGB 7.5*   HCT 24.2*   MCV 79*        BMP:  No results for input(s): "GLU", "NA", "K", "CL", "CO2", "BUN", "CREATININE", "CALCIUM", "MG" in the last 24 hours.  LFT:  Lab Results   Component Value Date    AST 22 09/20/2023    ALKPHOS 87 09/20/2023    BILITOT 0.7 09/20/2023     Albumin:   Albumin   Date Value Ref Range Status   09/20/2023 2.1 (L) 3.5 - 5.2 g/dL Final     Protein:   Total Protein   Date Value Ref Range Status   09/20/2023 6.7 6.0 - 8.4 g/dL Final     Lactic acid:   Lab Results   Component Value Date    LACTATE 0.8 08/05/2023    LACTATE 0.7 07/30/2023     Significant Imaging: I have reviewed all pertinent imaging results/findings within the past 24 hours.      I spent a total of 90 minutes on the day of the visit. This includes face to face time in discussion of goals of care, symptom assessment, coordination of care and emotional support.  This also includes non-face to face time preparing to see the patient (eg, review of tests/imaging), obtaining and/or reviewing separately obtained history, documenting clinical information in the electronic or other health record, independently interpreting results and communicating results to the patient/family/caregiver, or care coordinator.    Veena Gilliam NP  Palliative Medicine  [facility]            "

## 2023-09-21 NOTE — ASSESSMENT & PLAN NOTE
- also with melena; GI following with eval for IP vs OP endoscopy   - on eliquis at home for a flutter, held on admit for potential bleeding   - required pRBC this admission; hgb currently >7 following transfusion   - continued management per primary team

## 2023-09-21 NOTE — TELEPHONE ENCOUNTER
MA attempted to contact patient to schedule hospital follow up with GI, but patient is not accepting calls at this time. Unable to leave a voicemail.     GI appointment was mailed to address on file.

## 2023-09-21 NOTE — TELEPHONE ENCOUNTER
----- Message from Dorothy Cheney NP sent at 9/21/2023 12:58 PM CDT -----  Regarding: GI appt  Good Afternoon      Would you please schedule this patient for an appt with GI Clinic , for anemia with melena in the past , once discharged from inpatient. Any available provider is fine.          Thanks,  Dorothy Cheney NP

## 2023-09-21 NOTE — ASSESSMENT & PLAN NOTE
History of paroxysmal atrial flutter  Not in Flutter at this time   continue amiodarone 200 mg daily. eliquis currently held per gi recommendation

## 2023-09-21 NOTE — NURSING
[facility]  Nurses Note -- 4 Eyes      9/21/2023       Skin assessed on: Q Shift      [x] No Pressure Injuries Present    [x]Prevention Measures Documented    [] Yes LDA  for Pressure Injury Previously documented     [] Yes New Pressure Injury Discovered   [] LDA for New Pressure Injury Added      Attending RN:  Ambrosio Hook RN     Second RN:  JOSLYN Swain

## 2023-09-21 NOTE — ASSESSMENT & PLAN NOTE
Patient's FSGs are uncontrolled due to hyperglycemia on current medication regimen.  Last A1c reviewed-   Lab Results   Component Value Date    HGBA1C 5.5 09/20/2023     Most recent fingerstick glucose reviewed-   Recent Labs   Lab 09/20/23  1639 09/20/23  1732 09/20/23 2027 09/21/23  0728   POCTGLUCOSE 78 92 111* 109     Current correctional scale  Medium  Maintain anti-hyperglycemic dose as follows-   Antihyperglycemics (From admission, onward)    Start     Stop Route Frequency Ordered    09/20/23 0900  insulin detemir U-100 (Levemir) pen 5 Units         -- SubQ 2 times daily 09/20/23 0513    09/20/23 0115  insulin aspart U-100 pen 0-5 Units         -- SubQ Before meals & nightly PRN 09/20/23 0015        Hold Oral hypoglycemics while patient is in the hospital.

## 2023-09-21 NOTE — HPI
"From H&P: " 72-year-old  male with medical history significant for congestive heart failure, coronary artery disease, type 2 diabetes mellitus, chronic kidney disease stage 4, hypertension, hyperlipidemia, multiple thyroid nodule and alcohol use disorder in remission was transferred from [facility] ED after presenting with chest pain of 1 hour duration, chest pain was said to be insidious in onset, non-exertional, as patient just finished breakfast and was watching tv when he noticed chest pain across his chest, described as stabbing/someone pounding on my chest, chest pain was nonradiating, said to be 10/10 at onset now 9/10, denied diaphoresis, no nausea, no vomiting although associated with palpitation.  He denied any cough, no change in baseline orthopnea of 2 pillows, no paroxysmal nocturnal dyspnea, although has 2+ of bilateral pitting pedal edema he denied fever, chills, rigors, denied urinary symptoms of dysuria, frequency, urgency, straining at micturition or hematuria.  No change in bowel habits, denied diarrhea, and constipation.  No previous history of tobacco use, voiced quitting alcohol sometimes back.  At presention to the outside hospital lab was significant for hypokalemia of 3.1, BUN creatinine of 69/3.5, with a baseline of 2.1-2.5, H&H 7.4/23.5.  Troponin was 0.053-0.055-0.056 essentially flat similar to previous, be in [facility] inpatient in the [facility], most recent prior to this was 487 in July 27, 2023.  CT abdomen and pelvis without contrast showed "bilateral pleural effusion diffuse body wall edema, smooth interlobular septal thickening which may relate to vascular congestion/mild interstitial edema".Pain was mildly improved with aspirin and nitro SG.Had similar chest pain in the past as well."    Palliative medicine consulted for goals of care discussion and advance care planning; for details of visit, see advance care planning section of plan.     "

## 2023-09-21 NOTE — ASSESSMENT & PLAN NOTE
- 9/19 Echo: EF 55-60%, Grade III diastolic dysfunction, pulm HTN  - edema on admit, diuresing per primary team management    - discussed trajectory of life-limiting condition  - cardiology following with planned MARIA for further eval of mass seen on echo   - continued management per primary team

## 2023-09-21 NOTE — SUBJECTIVE & OBJECTIVE
Interval History: Pt states cp Is improved, but still with sob. Awaiting cardiology evaluation after echo findings    Review of Systems  Objective:     Vital Signs (Most Recent):  Temp: 98.4 °F (36.9 °C) (09/21/23 0726)  Pulse: 62 (09/21/23 0726)  Resp: 20 (09/21/23 0726)  BP: (!) 121/58 (09/21/23 0726)  SpO2: 98 % (09/21/23 0726) Vital Signs (24h Range):  Temp:  [97.4 °F (36.3 °C)-98.5 °F (36.9 °C)] 98.4 °F (36.9 °C)  Pulse:  [60-65] 62  Resp:  [17-20] 20  SpO2:  [96 %-100 %] 98 %  BP: (104-129)/(55-61) 121/58     Weight: 100.4 kg (221 lb 5.5 oz)  Body mass index is 30.87 kg/m².    Intake/Output Summary (Last 24 hours) at 9/21/2023 1019  Last data filed at 9/21/2023 0730  Gross per 24 hour   Intake 413.33 ml   Output 2100 ml   Net -1686.67 ml         Physical Exam  Vitals reviewed.   Constitutional:       General: He is not in acute distress.     Appearance: He is ill-appearing.   HENT:      Head: Normocephalic and atraumatic.      Mouth/Throat:      Mouth: Mucous membranes are moist.      Pharynx: Oropharynx is clear.   Eyes:      General: No scleral icterus.     Extraocular Movements: Extraocular movements intact.   Cardiovascular:      Rate and Rhythm: Normal rate and regular rhythm.   Pulmonary:      Effort: Pulmonary effort is normal. No respiratory distress (on 2L nc. sitting up in bed).   Abdominal:      General: There is no distension.      Palpations: Abdomen is soft.      Tenderness: There is no abdominal tenderness.   Musculoskeletal:         General: Swelling (bilateral pitting edema to the knee.) present.   Neurological:      General: No focal deficit present.      Mental Status: He is alert and oriented to person, place, and time.   Psychiatric:         Mood and Affect: Mood normal.         Behavior: Behavior normal.             Significant Labs: All pertinent labs within the past 24 hours have been reviewed.    Significant Imaging: I have reviewed all pertinent imaging results/findings within the  past 24 hours.

## 2023-09-21 NOTE — HPI
Patient is a pleasant 72-year-old man.  Past medical history of atrial fibrillation status post cardioversion earlier this year.  Currently in sinus rhythm.  Came in with some chest tightness and shortness of breath.  Chest tightness better after sublingual nitroglycerin.  Now chest pain-free.  Also has acute kidney injury.  Echo done yesterday demonstrated normal EF with grade 3 diastolic dysfunction.  There was echodensity seen in the left atria.  Cardiology has been consulted for further evaluation of that.  .        Left Ventricle: The left ventricle is normal in size. Normal wall thickness. Normal wall motion. There is normal systolic function with a visually estimated ejection fraction of 55 - 60%. Grade III diastolic dysfunction.    Left Atrium: Left atrium is severely dilated.    Right Ventricle: Moderate right ventricular enlargement. Systolic function is normal.    Right Atrium: Right atrium is severely dilated.    Aortic Valve: There is severe stenosis. Aortic valve area by VTI is 1.00 cm². Aortic valve peak velocity is 4.21 m/s. Mean gradient is 44 mmHg. The dimensionless index is 0.27. There is moderate aortic regurgitation.    Mitral Valve: There is moderate regurgitation.    Tricuspid Valve: There is moderate regurgitation.    Pulmonary Artery: The estimated pulmonary artery systolic pressure is 80 mmHg.    IVC/SVC: Elevated venous pressure at 15 mmHg.    1 cm x 1.4 cm mass seen in some views in LA near the MV.. recommend MARIA if clinically indicated.           HPI: 72-year-old  male with medical history significant for congestive heart failure, coronary artery disease, type 2 diabetes mellitus, chronic kidney disease stage 4, hypertension, hyperlipidemia, multiple thyroid nodule and alcohol use disorder in remission was transferred from [facility] ED after presenting with chest pain of 1 hour duration, chest pain was said to be insidious in onset, non-exertional, as patient just  "finished breakfast and was watching tv when he noticed chest pain across his chest, described as stabbing/someone pounding on my chest, chest pain was nonradiating, said to be 10/10 at onset now 9/10, denied diaphoresis, no nausea, no vomiting although associated with palpitation.  He denied any cough, no change in baseline orthopnea of 2 pillows, no paroxysmal nocturnal dyspnea, although has 2+ of bilateral pitting pedal edema he denied fever, chills, rigors, denied urinary symptoms of dysuria, frequency, urgency, straining at micturition or hematuria.  No change in bowel habits, denied diarrhea, and constipation.  No previous history of tobacco use, voiced quitting alcohol sometimes back.  At presention to the outside hospital lab was significant for hypokalemia of 3.1, BUN creatinine of 69/3.5, with a baseline of 2.1-2.5, H&H 7.4/23.5.  Troponin was 0.053-0.055-0.056 essentially flat similar to previous, be in [facility]a inpatient in the 1100, most recent prior to this was 487 in July 27, 2023.  CT abdomen and pelvis without contrast showed "bilateral pleural effusion diffuse body wall edema, smooth interlobular septal thickening which may relate to vascular congestion/mild interstitial edema".Pain was mildly improved with aspirin and nitro SG.Had similar chest pain in the past as well.  "

## 2023-09-21 NOTE — PLAN OF CARE
Problem: Adult Inpatient Plan of Care  Goal: Plan of Care Review  Outcome: Ongoing, Progressing     Problem: Adult Inpatient Plan of Care  Goal: Patient-Specific Goal (Individualized)  Outcome: Ongoing, Progressing     Problem: Diabetes Comorbidity  Goal: Blood Glucose Level Within Targeted Range  Outcome: Ongoing, Progressing     Problem: Nutrition Impaired (Sepsis/Septic Shock)  Goal: Optimal Nutrition Intake  Outcome: Ongoing, Progressing     Problem: Infection Progression (Sepsis/Septic Shock)  Goal: Absence of Infection Signs and Symptoms  Outcome: Ongoing, Progressing     Problem: Renal Function Impairment (Acute Kidney Injury/Impairment)  Goal: Effective Renal Function  Outcome: Ongoing, Progressing     Problem: Coping Ineffective  Goal: Effective Coping  Outcome: Ongoing, Progressing

## 2023-09-21 NOTE — ASSESSMENT & PLAN NOTE
"9/21/2023 - Consult   - consult received; interval chart reviewed in detail; palliative team dicussed pt with hospital primary, Dr. Evans   - met with patient at bedside; introduction to palliative medicine team and role in current care and admission   - learned more about pt outside of current admission; he lives independently at home; requires walker for ambulation and prior to recently hasn't had difficulty performing ADLs independently at home (such as prepare simple meals and light chores), but got increasingly difficult due to weakness which lead to current admission   - niece, Leigh, is pt's preferred HCPOA if he were to lack capacity in the future; denies spouse, children, or living siblings; offered completion of HCPOA documentation prior to discharge which pt wishes to complete   - GOC/ACP discussion; pt's overall GOC is continued full treatment and to extend life as long as possible as long as he continues to have a good quality of life; shares he would have to think further about wishes if things were to get worse or if he becomes bed bound; discussed different scenarios to consider and to discuss with preferred HCPOA, Leigh, expressing importance of her understanding his wishes   - discussed full code status, which does align with pt's wishes currently  - pt shared his concern for the continued "bad news" he continues to receive about his health; pt seems to have limited understanding of the details of some of his co-morbidities but seems to understand general/large concepts of his conditions; attempted to review concepts of pt's conditions related to anemia, CHF, and kidney function; encouraged questions to specialists to help clarify and understand his diagnoses   - would benefit from continued outpatient palliative involvement for further understanding multiple co-morbidities/life-limiting conditions with either palliative clinic or home palliative program   - plan to further discuss palliative " follow up preference and complete MPOA form with pt in follow  - offered to call pt's niece, Leigh, for update , which was attempted but unable to reach; will continued to attempt to reach   - emotional support provided   - Allowed time for questions/concerns; all addressed; expressed availability of myself/palliative team for additional questions/concerns

## 2023-09-21 NOTE — PROGRESS NOTES
"[facility] Medicine  Progress Note    Patient Name: Chato Bowie  MRN: [id number]  Patient Class: OP- Observation   Admission Date: 9/19/2023  Length of Stay: 0 days  Attending Physician: Jose L Evans III, MD  Primary Care Provider: Irlanda Valenzuela -        Subjective:     Principal Problem:Chest pain        HPI:  72-year-old  male with medical history significant for congestive heart failure, coronary artery disease, type 2 diabetes mellitus, chronic kidney disease stage 4, hypertension, hyperlipidemia, multiple thyroid nodule and alcohol use disorder in remission was transferred from [facility] ED after presenting with chest pain of 1 hour duration, chest pain was said to be insidious in onset, non-exertional, as patient just finished breakfast and was watching tv when he noticed chest pain across his chest, described as stabbing/someone pounding on my chest, chest pain was nonradiating, said to be 10/10 at onset now 9/10, denied diaphoresis, no nausea, no vomiting although associated with palpitation.  He denied any cough, no change in baseline orthopnea of 2 pillows, no paroxysmal nocturnal dyspnea, although has 2+ of bilateral pitting pedal edema he denied fever, chills, rigors, denied urinary symptoms of dysuria, frequency, urgency, straining at micturition or hematuria.  No change in bowel habits, denied diarrhea, and constipation.  No previous history of tobacco use, voiced quitting alcohol sometimes back.  At presention to the outside hospital lab was significant for hypokalemia of 3.1, BUN creatinine of 69/3.5, with a baseline of 2.1-2.5, H&H 7.4/23.5.  Troponin was 0.053-0.055-0.056 essentially flat similar to previous, be in [facility] inpatient in the 1100, most recent prior to this was 487 in July 27, 2023.  CT abdomen and pelvis without contrast showed "bilateral pleural effusion diffuse body wall edema, smooth interlobular septal thickening which may relate " "to vascular congestion/mild interstitial edema".Pain was mildly improved with aspirin and nitro SG.Had similar chest pain in the past as well.      Overview/Hospital Course:  No notes on file    Interval History: Pt states cp Is improved, but still with sob. Awaiting cardiology evaluation after echo findings    Review of Systems  Objective:     Vital Signs (Most Recent):  Temp: 98.4 °F (36.9 °C) (09/21/23 0726)  Pulse: 62 (09/21/23 0726)  Resp: 20 (09/21/23 0726)  BP: (!) 121/58 (09/21/23 0726)  SpO2: 98 % (09/21/23 0726) Vital Signs (24h Range):  Temp:  [97.4 °F (36.3 °C)-98.5 °F (36.9 °C)] 98.4 °F (36.9 °C)  Pulse:  [60-65] 62  Resp:  [17-20] 20  SpO2:  [96 %-100 %] 98 %  BP: (104-129)/(55-61) 121/58     Weight: 100.4 kg (221 lb 5.5 oz)  Body mass index is 30.87 kg/m².    Intake/Output Summary (Last 24 hours) at 9/21/2023 1019  Last data filed at 9/21/2023 0730  Gross per 24 hour   Intake 413.33 ml   Output 2100 ml   Net -1686.67 ml         Physical Exam  Vitals reviewed.   Constitutional:       General: He is not in acute distress.     Appearance: He is ill-appearing.   HENT:      Head: Normocephalic and atraumatic.      Mouth/Throat:      Mouth: Mucous membranes are moist.      Pharynx: Oropharynx is clear.   Eyes:      General: No scleral icterus.     Extraocular Movements: Extraocular movements intact.   Cardiovascular:      Rate and Rhythm: Normal rate and regular rhythm.   Pulmonary:      Effort: Pulmonary effort is normal. No respiratory distress (on 2L nc. sitting up in bed).   Abdominal:      General: There is no distension.      Palpations: Abdomen is soft.      Tenderness: There is no abdominal tenderness.   Musculoskeletal:         General: Swelling (bilateral pitting edema to the knee.) present.   Neurological:      General: No focal deficit present.      Mental Status: He is alert and oriented to person, place, and time.   Psychiatric:         Mood and Affect: Mood normal.         Behavior: Behavior " "normal.             Significant Labs: All pertinent labs within the past 24 hours have been reviewed.    Significant Imaging: I have reviewed all pertinent imaging results/findings within the past 24 hours.      Assessment/Plan:      * Chest pain  He presented with chest pain  Said to be insidious in onset, nonradiating, although at previous chest pain  Chest pain is atypical in presentation, due to patient being high-risk  Diabetic, hypertensive, coronary artery disease, hyperlipidemia  Chest we will need workup and risk stratification  Troponin x3 were almost not 0.053-0.055-0.056  We will continue medical management at this time while awaiting Cardiology consult  Continue aspirin, atorvastatin, apixaban.      Left atrial mass  Pt noted to have indeterminate LA mass on Echo 9/20 which was not present on previous echo from 3 month pta.    -cardiology consulted for shabnam evaluation  -bc ordered, but does not seem to have reason for infectious source    ACP (advance care planning)  Advance Care Planning     Date: 09/21/2023  Pt currently full code and wants "anything that will make him feel better" 3 minutes spent on ACP  -palliative care consulted          Multiple wounds  Noticed to have decubitus ulcers  With sacral 2 x 1 cm  Dressed, consult wound care at this time.  Frequent turning      acute on chronic diastolic heart failure  History of chronic diastolic heart failure  We will repeat 2D echo at this time   Continue diuresis   Strict input and output  Low-salt and cardiac diet.    CKD (chronic kidney disease), stage IV  History of CKD stage 5   Baseline creatinine at 2.1-2.5, presented with creatinine of 3.5  This is likely ELIZABETH on CKD stage 4  In view of worsening decompensated heart failure this may be cardiorenal syndrome   Other evidence GI loss/acute blood loss  No NSAID use or antibiotics  Expected improvement with diuresis and better cardiac function.      Acute on chronic diastolic congestive heart " failure  Patient is identified as having Diastolic (HFpEF) heart failure that is Acute on chronic. CHF is currently uncontrolled due to Continued edema of extremities.   Latest ECHO performed and demonstrates- Results for orders placed during the hospital encounter of 06/14/23    Echo    Interpretation Summary  · The left ventricle is normal in size with concentric hypertrophy and normal systolic function.  · The estimated ejection fraction is 60%.  · Grade II left ventricular diastolic dysfunction.  · Normal right ventricular size with normal right ventricular systolic function.  · Moderate left atrial enlargement.  · There is moderate aortic valve stenosis.  · Aortic valve area is 1.43 cm2; peak velocity is 3.94 m/s; mean gradient is 31 mmHg.  · Mild-to-moderate aortic regurgitation.  · Mild mitral regurgitation.  · Normal central venous pressure (3 mmHg).  · The estimated PA systolic pressure is 39 mmHg.  . Continue Furosemide and monitor clinical status closely. Monitor on telemetry. Patient is on CHF pathway.  Monitor strict Is&Os and daily weights.  Place on fluid restriction of 1.5 L. Cardiology has not been any consulted. Continue to stress to patient importance of self efficacy and  on diet for CHF. Last BNP reviewed- and noted below   Recent Labs   Lab 09/19/23  1212   BNP 1,100*   .      Paroxysmal atrial flutter  History of paroxysmal atrial flutter  Not in Flutter at this time   continue amiodarone 200 mg daily. eliquis currently held per gi recommendation    Severe aortic valve stenosis  Repeat Echo 9/20 with noted severe AS which has progressed from echo 3 months pta  Likely contributing to pts sxs    -Cardiology consulted  -benefit from outpatient evaluation for tavr  -palliative care consulted      BPH (benign prostatic hyperplasia)  History of BPH  He has not been taking his tamsulosin  We will reconsider restarting tamsulosin with urinary retention,/as needed.  Denied any urinary symptoms  at this time, he has a condom catheter in place to help with strict urinary output measurements.      Alcohol abuse  Alcohol abuse likely in remission  Patient voiced not drinking alcohol in a while   Prior documented alcohol abstinence, not at risk of alcohol withdrawal at this time  For safety reasons we will place patient on CIWA protocol, but not on medication at this time  Current electrolyte abnormality, thiamine, folic acid and multivitamin daily.      Hypokalemia  He presented with serum potassium of 3.1   Likely complicating diuresis    We will replete serum potassium continue to monitor with bmp and replace prn      Anemia  likely anemia of chronic kidney disease  Currently has CKD stage 4, previous panel showed iron of 17, TIBC 207, saturation of 8, transferrin 140, ferritin 714  To continue supplementation.  No evidence of acute blood loss  Continue to correct iron deficiency.      Type 2 diabetes mellitus with kidney complication, with long-term current use of insulin  Patient's FSGs are uncontrolled due to hyperglycemia on current medication regimen.  Last A1c reviewed-   Lab Results   Component Value Date    HGBA1C 5.5 09/20/2023     Most recent fingerstick glucose reviewed-   Recent Labs   Lab 09/20/23  1639 09/20/23  1732 09/20/23 2027 09/21/23  0728   POCTGLUCOSE 78 92 111* 109     Current correctional scale  Medium  Maintain anti-hyperglycemic dose as follows-   Antihyperglycemics (From admission, onward)    Start     Stop Route Frequency Ordered    09/20/23 0900  insulin detemir U-100 (Levemir) pen 5 Units         -- SubQ 2 times daily 09/20/23 0513    09/20/23 0115  insulin aspart U-100 pen 0-5 Units         -- SubQ Before meals & nightly PRN 09/20/23 0015        Hold Oral hypoglycemics while patient is in the hospital.    Dyslipidemia  History dyslipidemia   Continue atorvastatin 80 mg daily   Encouraged lifestyle modification.      Essential hypertension  History of hypertension  Continue  home antihypertensive with holding parameters   Low-salt diet      Acute kidney injury superimposed on CKD  Patient with acute kidney injury/acute renal failure likely due to pre-renal azotemia from cardiorenal syndrome  History of diastolic heart failure with BNP 1100, prior to this was 487  Expecting improvement with diuresis  Strict input and output  ELIZABETH is currently stable. Baseline creatinine 2.1 to 2.5 - Labs reviewed- Renal function/electrolytes with Estimated Creatinine Clearance: 23.7 mL/min (A) (based on SCr of 3.4 mg/dL (H)). according to latest data.  Monitor urine output and serial BMP and adjust therapy as needed.   Avoid nephrotoxins and renally dose meds for GFR listed above.      VTE Risk Mitigation (From admission, onward)         Ordered     IP VTE HIGH RISK PATIENT  Once         09/21/23 1018     Place sequential compression device  Until discontinued         09/21/23 1018     Place sequential compression device  Until discontinued         09/20/23 0013                Discharge Planning   ELY:      Code Status: Full Code   Is the patient medically ready for discharge?:     Reason for patient still in hospital (select all that apply): Treatment, Imaging and Consult recommendations  Discharge Plan A: Return to nursing home                  Jose L Evans III, MD  Department of Hospital Medicine   [facility] [place] [facility]

## 2023-09-21 NOTE — NURSING
Patient arrived back on the unit via stretcher, Drowsy, awakens when name is called, on 6L nc, no distress noted.Tele continued, situated in bed, Condom cath in place. Bag placed to gravity. Bed in lowest position, wheels locked, call light in reach. Will monitor.

## 2023-09-21 NOTE — ASSESSMENT & PLAN NOTE
Pt noted to have indeterminate LA mass on Echo 9/20 which was not present on previous echo from 3 month pta.    -cardiology consulted for shabnam evaluation  -bc ordered, but does not seem to have reason for infectious source

## 2023-09-21 NOTE — NURSING
[facility] [place]  Nurses Note -- 4 Eyes      9/20/2023       Skin assessed on: Q Shift      [] No Pressure Injuries Present    []Prevention Measures Documented    [x] Yes LDA  for Pressure Injury Previously documented     [] Yes New Pressure Injury Discovered   [] LDA for New Pressure Injury Added      Attending RN:  Esthela Barrow, RN     Second RN:  Kristel Tolbert LPN

## 2023-09-21 NOTE — ASSESSMENT & PLAN NOTE
- sacral and foot wound noted; complicated by T2DM   - wound care following; continued management per wound care and primary teams

## 2023-09-21 NOTE — ASSESSMENT & PLAN NOTE
likely anemia of chronic kidney disease  Currently has CKD stage 4, previous panel showed iron of 17, TIBC 207, saturation of 8, transferrin 140, ferritin 714  To continue supplementation.  No evidence of acute blood loss, but pt stating he was having dark colored stools at home  Continue to correct iron deficiency.    -hgb stable after 1 unit prbc  -GI consulted and following although acute bleed seems less likely at this time. Will discuss with GI timing to restart eliquis

## 2023-09-21 NOTE — NURSING
Patient left unit for [facility].he was transported on stretcher by ruthie.patient alert oriented.O2 sats NC at 2lit.

## 2023-09-21 NOTE — ASSESSMENT & PLAN NOTE
Patient is identified as having Diastolic (HFpEF) heart failure that is Acute on chronic. CHF is currently uncontrolled due to Continued edema of extremities.   Latest ECHO performed and demonstrates- Results for orders placed during the hospital encounter of 06/14/23    Echo    Interpretation Summary  · The left ventricle is normal in size with concentric hypertrophy and normal systolic function.  · The estimated ejection fraction is 60%.  · Grade II left ventricular diastolic dysfunction.  · Normal right ventricular size with normal right ventricular systolic function.  · Moderate left atrial enlargement.  · There is moderate aortic valve stenosis.  · Aortic valve area is 1.43 cm2; peak velocity is 3.94 m/s; mean gradient is 31 mmHg.  · Mild-to-moderate aortic regurgitation.  · Mild mitral regurgitation.  · Normal central venous pressure (3 mmHg).  · The estimated PA systolic pressure is 39 mmHg.  . Continue Furosemide and monitor clinical status closely. Monitor on telemetry. Patient is on CHF pathway.  Monitor strict Is&Os and daily weights.  Place on fluid restriction of 1.5 L. Cardiology has not been any consulted. Continue to stress to patient importance of self efficacy and  on diet for CHF. Last BNP reviewed- and noted below   Recent Labs   Lab 09/19/23  1212   BNP 1,100*   .

## 2023-09-21 NOTE — ASSESSMENT & PLAN NOTE
- noted; awaiting MARIA today  - cardiology following and recommending follow up in valve clinic   - continued management per primary team

## 2023-09-21 NOTE — PROGRESS NOTES
GI PLAN OF CARE NOTE    S/P echo with MV mass findings. Hgb stable and corrected appropriately after transfusion. No overt bleeding reported. Continue to monitor counts.     Shortness of breath. Chest pain. Chronic Anemia. Hypokalemia.Current anticoagulant use.  Plan/ Recommendations:  1.  Reports shortness of breath and on 2 lpm nc, says chest pain is improved but not resolved. ECHO abnormal, HM consulted cardiology. Denies any recent overt bleeding. Anemia possibly multifactorial.   Will f/u anemia in clinic.         Thank you so much for allowing us to participate in the care of Chato Bowie . Please contact us if you have any additional questions.     Dorothy Cheney NP  Gastroenterology  [facility] - Med Surg

## 2023-09-22 ENCOUNTER — TELEPHONE (OUTPATIENT)
Dept: HEMATOLOGY/ONCOLOGY | Facility: CLINIC | Age: 73
End: 2023-09-22
Payer: MEDICARE

## 2023-09-22 LAB
ANION GAP SERPL CALC-SCNC: 11 MMOL/L (ref 8–16)
BUN SERPL-MCNC: 65 MG/DL (ref 8–23)
CALCIUM SERPL-MCNC: 8.9 MG/DL (ref 8.7–10.5)
CHLORIDE SERPL-SCNC: 100 MMOL/L (ref 95–110)
CO2 SERPL-SCNC: 26 MMOL/L (ref 23–29)
CREAT SERPL-MCNC: 3.3 MG/DL (ref 0.5–1.4)
ERYTHROCYTE [DISTWIDTH] IN BLOOD BY AUTOMATED COUNT: 15.5 % (ref 11.5–14.5)
EST. GFR  (NO RACE VARIABLE): 19 ML/MIN/1.73 M^2
GLUCOSE SERPL-MCNC: 150 MG/DL (ref 70–110)
HCT VFR BLD AUTO: 24.2 % (ref 40–54)
HGB BLD-MCNC: 7.4 G/DL (ref 14–18)
MCH RBC QN AUTO: 24.7 PG (ref 27–31)
MCHC RBC AUTO-ENTMCNC: 30.6 G/DL (ref 32–36)
MCV RBC AUTO: 81 FL (ref 82–98)
PLATELET # BLD AUTO: 292 K/UL (ref 150–450)
PMV BLD AUTO: 8.6 FL (ref 9.2–12.9)
POCT GLUCOSE: 151 MG/DL (ref 70–110)
POCT GLUCOSE: 168 MG/DL (ref 70–110)
POCT GLUCOSE: 168 MG/DL (ref 70–110)
POCT GLUCOSE: 177 MG/DL (ref 70–110)
POTASSIUM SERPL-SCNC: 3.2 MMOL/L (ref 3.5–5.1)
RBC # BLD AUTO: 3 M/UL (ref 4.6–6.2)
SODIUM SERPL-SCNC: 137 MMOL/L (ref 136–145)
WBC # BLD AUTO: 9.4 K/UL (ref 3.9–12.7)

## 2023-09-22 PROCEDURE — 99232 SBSQ HOSP IP/OBS MODERATE 35: CPT | Mod: ,,, | Performed by: REGISTERED NURSE

## 2023-09-22 PROCEDURE — 25000003 PHARM REV CODE 250: Performed by: STUDENT IN AN ORGANIZED HEALTH CARE EDUCATION/TRAINING PROGRAM

## 2023-09-22 PROCEDURE — 99497 ADVNCD CARE PLAN 30 MIN: CPT | Mod: ,,, | Performed by: REGISTERED NURSE

## 2023-09-22 PROCEDURE — 85027 COMPLETE CBC AUTOMATED: CPT | Performed by: STUDENT IN AN ORGANIZED HEALTH CARE EDUCATION/TRAINING PROGRAM

## 2023-09-22 PROCEDURE — 80048 BASIC METABOLIC PNL TOTAL CA: CPT | Performed by: STUDENT IN AN ORGANIZED HEALTH CARE EDUCATION/TRAINING PROGRAM

## 2023-09-22 PROCEDURE — 99232 PR SUBSEQUENT HOSPITAL CARE,LEVL II: ICD-10-PCS | Mod: ,,, | Performed by: REGISTERED NURSE

## 2023-09-22 PROCEDURE — 25000003 PHARM REV CODE 250: Performed by: NURSE PRACTITIONER

## 2023-09-22 PROCEDURE — 90471 IMMUNIZATION ADMIN: CPT | Performed by: STUDENT IN AN ORGANIZED HEALTH CARE EDUCATION/TRAINING PROGRAM

## 2023-09-22 PROCEDURE — G0008 ADMIN INFLUENZA VIRUS VAC: HCPCS | Performed by: STUDENT IN AN ORGANIZED HEALTH CARE EDUCATION/TRAINING PROGRAM

## 2023-09-22 PROCEDURE — 99900035 HC TECH TIME PER 15 MIN (STAT)

## 2023-09-22 PROCEDURE — 99497 PR ADVNCD CARE PLAN 30 MIN: ICD-10-PCS | Mod: ,,, | Performed by: REGISTERED NURSE

## 2023-09-22 PROCEDURE — 21400001 HC TELEMETRY ROOM

## 2023-09-22 PROCEDURE — 36415 COLL VENOUS BLD VENIPUNCTURE: CPT | Performed by: STUDENT IN AN ORGANIZED HEALTH CARE EDUCATION/TRAINING PROGRAM

## 2023-09-22 PROCEDURE — 90694 VACC AIIV4 NO PRSRV 0.5ML IM: CPT | Performed by: STUDENT IN AN ORGANIZED HEALTH CARE EDUCATION/TRAINING PROGRAM

## 2023-09-22 PROCEDURE — 63600175 PHARM REV CODE 636 W HCPCS: Performed by: STUDENT IN AN ORGANIZED HEALTH CARE EDUCATION/TRAINING PROGRAM

## 2023-09-22 RX ORDER — POTASSIUM CHLORIDE 20 MEQ/1
40 TABLET, EXTENDED RELEASE ORAL ONCE
Status: COMPLETED | OUTPATIENT
Start: 2023-09-22 | End: 2023-09-22

## 2023-09-22 RX ORDER — HYDRALAZINE HYDROCHLORIDE 100 MG/1
50 TABLET, FILM COATED ORAL EVERY 8 HOURS
Qty: 135 TABLET | Refills: 3 | Status: SHIPPED | OUTPATIENT
Start: 2023-09-22 | End: 2023-09-23 | Stop reason: DRUGHIGH

## 2023-09-22 RX ADMIN — THERA TABS 1 TABLET: TAB at 09:09

## 2023-09-22 RX ADMIN — GABAPENTIN 100 MG: 100 CAPSULE ORAL at 09:09

## 2023-09-22 RX ADMIN — AMIODARONE HYDROCHLORIDE 200 MG: 200 TABLET ORAL at 09:09

## 2023-09-22 RX ADMIN — FUROSEMIDE 40 MG: 10 INJECTION, SOLUTION INTRAVENOUS at 05:09

## 2023-09-22 RX ADMIN — ATORVASTATIN CALCIUM 80 MG: 40 TABLET, FILM COATED ORAL at 09:09

## 2023-09-22 RX ADMIN — FERROUS SULFATE TAB 325 MG (65 MG ELEMENTAL FE) 1 EACH: 325 (65 FE) TAB at 09:09

## 2023-09-22 RX ADMIN — METOPROLOL SUCCINATE 25 MG: 25 TABLET, EXTENDED RELEASE ORAL at 09:09

## 2023-09-22 RX ADMIN — POTASSIUM CHLORIDE 40 MEQ: 1500 TABLET, FILM COATED, EXTENDED RELEASE ORAL at 09:09

## 2023-09-22 RX ADMIN — APIXABAN 5 MG: 5 TABLET, FILM COATED ORAL at 11:09

## 2023-09-22 RX ADMIN — LIDOCAINE 5% 1 PATCH: 700 PATCH TOPICAL at 09:09

## 2023-09-22 RX ADMIN — FUROSEMIDE 40 MG: 10 INJECTION, SOLUTION INTRAVENOUS at 01:09

## 2023-09-22 RX ADMIN — APIXABAN 5 MG: 5 TABLET, FILM COATED ORAL at 08:09

## 2023-09-22 RX ADMIN — ALLOPURINOL 100 MG: 100 TABLET ORAL at 09:09

## 2023-09-22 RX ADMIN — INFLUENZA A VIRUS A/VICTORIA/4897/2022 IVR-238 (H1N1) ANTIGEN (FORMALDEHYDE INACTIVATED), INFLUENZA A VIRUS A/DARWIN/6/2021 IVR-227 (H3N2) ANTIGEN (FORMALDEHYDE INACTIVATED), INFLUENZA B VIRUS B/AUSTRIA/1359417/2021 BVR-26 ANTIGEN (FORMALDEHYDE INACTIVATED), INFLUENZA B VIRUS B/PHUKET/3073/2013 BVR-1B ANTIGEN (FORMALDEHYDE INACTIVATED) 0.5 ML: 15; 15; 15; 15 INJECTION, SUSPENSION INTRAMUSCULAR at 03:09

## 2023-09-22 RX ADMIN — INSULIN DETEMIR 5 UNITS: 100 INJECTION, SOLUTION SUBCUTANEOUS at 08:09

## 2023-09-22 RX ADMIN — FOLIC ACID 1 MG: 1 TABLET ORAL at 09:09

## 2023-09-22 RX ADMIN — INSULIN DETEMIR 5 UNITS: 100 INJECTION, SOLUTION SUBCUTANEOUS at 09:09

## 2023-09-22 RX ADMIN — ACETAMINOPHEN 650 MG: 325 TABLET ORAL at 11:09

## 2023-09-22 RX ADMIN — GABAPENTIN 100 MG: 100 CAPSULE ORAL at 08:09

## 2023-09-22 NOTE — CONSULTS
Food & Nutrition  Education    Diet Education: Low Sodium fluid restriction  Time Spent:RD remote  Learners:Patient       Nutrition Education provided with handouts:   + Clinical Reference attachments to d/c documents      Comments:  Patient is on a low na, 1500mls fluid restriction.  Labs reviewed. MARCY.  LBM: 9/18/2023.  I/O since admit: -2565mls.  RD to continue to monitor po intake and tolerance.    Patient Active Problem List   Diagnosis    Abnormal EKG    Epigastric abdominal pain    Sepsis    Intractable abdominal pain    Fever    Intractable vomiting with nausea    Acute kidney injury superimposed on CKD    Essential hypertension    Dyslipidemia    Type 2 diabetes mellitus with kidney complication, with long-term current use of insulin    Anemia    GERD (gastroesophageal reflux disease)    Hypokalemia    Alcohol abuse    Left knee pain    NSTEMI (non-ST elevated myocardial infarction)    Hypomagnesemia    BPH (benign prostatic hyperplasia)    Severe aortic valve stenosis    Chest pain    Paroxysmal atrial flutter    Acute on chronic diastolic congestive heart failure    Elevated serum creatinine    CKD (chronic kidney disease), stage IV    Fracture of one rib, right side, initial encounter for closed fracture    Acute on chronic diastolic (congestive) heart failure    UTI (urinary tract infection)    Anasarca    acute on chronic diastolic heart failure    Acute hypoxemic respiratory failure    Severe malnutrition    Acute gout of left knee    Debility Due to left knee pain and effusion due to gout? and/or injury    DJD (degenerative joint disease)    Multiple thyroid nodules    Overweight (BMI 25.0-29.9)    Elevated troponin    Skin rash    Acute encephalopathy    Falls    COVID-19 virus infection    Multiple wounds    Effusion of left knee    Discharge planning issues    ACP (advance care planning)    Left atrial mass       Past Medical History:   Diagnosis Date    Acute congestive heart failure 3/20/2020     Alcohol abuse     Arthritis     Asthma attack 11/24/2021    Coronary artery disease     Diabetes mellitus     Hyperlipemia     Hypertension     Multiple thyroid nodules 6/14/2023    Rhabdomyolysis        BMP  Lab Results   Component Value Date     09/22/2023    K 3.2 (L) 09/22/2023     09/22/2023    CO2 26 09/22/2023    BUN 65 (H) 09/22/2023    CREATININE 3.3 (H) 09/22/2023    CALCIUM 8.9 09/22/2023    ANIONGAP 11 09/22/2023    EGFRNORACEVR 19 (A) 09/22/2023     Lab Results   Component Value Date    HGBA1C 5.5 09/20/2023     Lab Results   Component Value Date    CALCIUM 8.9 09/22/2023    PHOS 4.5 09/20/2023         All questions and concerns answered. Dietitian's contact information provided.       Follow-Up:  Yes    Please Re-consult as needed        Thanks!  Connie Christina, MS, RDN, LDN

## 2023-09-22 NOTE — PLAN OF CARE
Problem: Adult Inpatient Plan of Care  Goal: Plan of Care Review  Outcome: Ongoing, Progressing  Goal: Optimal Comfort and Wellbeing  Outcome: Ongoing, Progressing     Problem: Diabetes Comorbidity  Goal: Blood Glucose Level Within Targeted Range  Outcome: Ongoing, Progressing     Problem: Adjustment to Illness (Sepsis/Septic Shock)  Goal: Optimal Coping  Outcome: Ongoing, Progressing     Problem: Bleeding (Sepsis/Septic Shock)  Goal: Absence of Bleeding  Outcome: Ongoing, Progressing     Problem: Glycemic Control Impaired (Sepsis/Septic Shock)  Goal: Blood Glucose Level Within Desired Range  Outcome: Ongoing, Progressing     Problem: Infection Progression (Sepsis/Septic Shock)  Goal: Absence of Infection Signs and Symptoms  Outcome: Ongoing, Progressing     Problem: Nutrition Impaired (Sepsis/Septic Shock)  Goal: Optimal Nutrition Intake  Outcome: Ongoing, Progressing     Problem: Fluid and Electrolyte Imbalance (Acute Kidney Injury/Impairment)  Goal: Fluid and Electrolyte Balance  Outcome: Ongoing, Progressing     Problem: Renal Function Impairment (Acute Kidney Injury/Impairment)  Goal: Effective Renal Function  Outcome: Ongoing, Progressing     Problem: Impaired Wound Healing  Goal: Optimal Wound Healing  Outcome: Ongoing, Progressing     Problem: Skin Injury Risk Increased  Goal: Skin Health and Integrity  Outcome: Ongoing, Progressing     Problem: Coping Ineffective  Goal: Effective Coping  Outcome: Ongoing, Progressing

## 2023-09-22 NOTE — PLAN OF CARE
Pt is AAOx4. Pt weaned down to room air. Tele maintained.   No falls or new injuries reported during shift, safety precautions maintained.    Problem: Adult Inpatient Plan of Care  Goal: Plan of Care Review  Outcome: Ongoing, Progressing     Problem: Adult Inpatient Plan of Care  Goal: Optimal Comfort and Wellbeing  Outcome: Ongoing, Progressing

## 2023-09-22 NOTE — PLAN OF CARE
NH orders uploaded to [facility] and sent for review to [facility].  Message sent to notify that the orders have been uploaded and that the pt is ready to return to facility on today.

## 2023-09-22 NOTE — ASSESSMENT & PLAN NOTE
- 9/19 Echo: EF 55-60%, Grade III diastolic dysfunction, pulm HTN  - edema on admit, diuresing per primary team management    - discussed trajectory of life-limiting condition  - cardiology following; MARIA yesterday   - continued management per primary team

## 2023-09-22 NOTE — PLAN OF CARE
CM spoke to Mimi with the flow center and changed transportation to [facility] with O2. Pt returning to [facility]. CM notified nurse, Ambrosio that pt is ready for discharge from CM standpoint. All CM needs met.     09/22/23 1536   Final Note   Assessment Type Final Discharge Note   Anticipated Discharge Disposition Int Care Fac   What phone number can be called within the next 1-3 days to see how you are doing after discharge? [phone]   Hospital Resources/Appts/Education Provided Appointments scheduled and added to AVS   Post-Acute Status   Coverage [employer]   Patient choice form signed by patient/caregiver List from System Post-Acute Care   Discharge Delays (!) PFC Arranged Transportation

## 2023-09-22 NOTE — ANESTHESIA POSTPROCEDURE EVALUATION
Anesthesia Post Evaluation    Patient: Chato Bowie    Procedure(s) Performed: Procedure(s) (LRB):  Transesophageal echo (MARIA) intra-procedure log documentation (N/A)  ECHOCARDIOGRAM, TRANSESOPHAGEAL (N/A)    Final Anesthesia Type: general      Patient location during evaluation: floor  Patient participation: Yes- Able to Participate  Level of consciousness: awake and alert  Post-procedure vital signs: reviewed and stable  Pain management: adequate  Airway patency: patent    PONV status at discharge: No PONV  Anesthetic complications: no      Cardiovascular status: stable  Respiratory status: spontaneous ventilation  Hydration status: euvolemic  Follow-up not needed.          Vitals Value Taken Time   /61 09/22/23 0727   Temp 36.5 °C (97.7 °F) 09/22/23 0727   Pulse 64 09/22/23 0727   Resp 20 09/22/23 0727   SpO2 94 % 09/22/23 0727         No case tracking events are documented in the log.      Pain/Avery Score: No data recorded

## 2023-09-22 NOTE — PLAN OF CARE
[facility]     Department of Hospital Medicine     [address] [place] [address]     [phone] after hours  [phone] fax       NURSING HOME ORDERS    09/22/2023    Admit to Nursing Home:  Regular Bed                                                Diagnoses:  Active Hospital Problems    Diagnosis  POA    *Chest pain [R07.9]  Yes    ACP (advance care planning) [Z71.89]  Not Applicable    Left atrial mass [I51.89]  Yes    Multiple wounds [T07.XXXA]  Yes     Chronic    acute on chronic diastolic heart failure [I50.32]  Yes    CKD (chronic kidney disease), stage IV [N18.4]  Yes    Acute on chronic diastolic congestive heart failure [I50.33]  Yes    Paroxysmal atrial flutter [I48.92]  Yes     Chronic    BPH (benign prostatic hyperplasia) [N40.0]  Yes    Severe aortic valve stenosis [I35.0]  Yes    Hypokalemia [E87.6]  Yes    Alcohol abuse [F10.10]  Yes    Dyslipidemia [E78.5]  Yes    Essential hypertension [I10]  Yes    Acute kidney injury superimposed on CKD [N17.9, N18.9]  Yes    Type 2 diabetes mellitus with kidney complication, with long-term current use of insulin [E11.29, Z79.4]  Not Applicable     Chronic    Anemia [D64.9]  Yes      Resolved Hospital Problems    Diagnosis Date Resolved POA    Type 2 diabetes mellitus with hyperglycemia [E11.65] 09/20/2023 Yes     Chronic    Typical atrial flutter [I48.3] 09/21/2023 Yes       Patient is homebound due to:  Chest pain    Allergies:Review of patient's allergies indicates:  No Known Allergies    Vitals:    Routine, once monthly    Diet: Cardiac   Supplement:  1 can every three times a day with meals                         Type:  House         Acitivities:    - Up in a chair each morning as tolerated   - Ambulate with assistance to bathroom   - Scheduled walks once each shift (every 8 hours)      LABS:  Per facility protocol   CMP, CBC each month for 3 months   Pre-albumin each month for 3 months   TSH every  year     Nursing Precautions:     - Aspiration precautions:             - Total assistance with meals            -  Upright 90 degrees befor during and after meals             -  Suction at bedside          - Fall precautions per nursing home protocol   - Decubitus precautions:        -  for positioning   - Pressure reducing foam mattress   - Turn patient every two hours. Use wedge pillows to anchor patient    CONSULTS:       Nutrition to evaluate and recommend diet     Psychiatry to evaluate and follow patients for delirium    MISCELLANEOUS CARE:               Can use 2L nc as needed for o2 saturations below 90%     Routine Skin for Bedridden Patients:  Apply moisture barrier cream to all    skin folds and wet areas in perineal area daily and after baths and                           all bowel movements.             Medications: Discontinue all previous medication orders, if any. See new list below.        Medication List        CHANGE how you take these medications      hydrALAZINE 100 MG tablet  Commonly known as: APRESOLINE  Take 0.5 tablets (50 mg total) by mouth every 8 (eight) hours. Hold for sbp<130  What changed: how much to take            CONTINUE taking these medications      albuterol 90 mcg/actuation inhaler  Commonly known as: PROVENTIL/VENTOLIN HFA  Inhale 2 puffs into the lungs every 6 (six) hours as needed for Wheezing or Shortness of Breath.     allopurinoL 100 MG tablet  Commonly known as: ZYLOPRIM  Take 1 tablet (100 mg total) by mouth once daily.     amiodarone 200 MG Tab  Commonly known as: PACERONE  Take 200 mg by mouth once daily.     benzonatate 100 MG capsule  Commonly known as: TESSALON  Take 100 mg by mouth 3 (three) times daily as needed for Cough.     ELIQUIS 5 mg Tab  Generic drug: apixaban  Take 5 mg by mouth 2 (two) times daily.     ferrous sulfate 325 mg (65 mg iron) Tab tablet  Commonly known as: FEOSOL  Take 325 mg by mouth once daily.     folic acid 1 MG tablet  Commonly  known as: FOLVITE  Take 1 tablet (1 mg total) by mouth once daily.     furosemide 80 MG tablet  Commonly known as: LASIX  Take 1 tablet (80 mg total) by mouth 2 (two) times daily.     gabapentin 100 MG capsule  Commonly known as: NEURONTIN  Take 1 capsule (100 mg total) by mouth 2 (two) times daily.     HYDROcodone-acetaminophen 5-325 mg per tablet  Commonly known as: NORCO  Take 1 tablet by mouth every 8 (eight) hours as needed for Pain.     insulin detemir U-100 (Levemir) 100 unit/mL (3 mL) Inpn pen  Inject 7 Units into the skin 2 (two) times daily.     LIDOcaine 5 %  Commonly known as: LIDODERM  Place 2 patches onto the skin once daily. Remove & Discard patch within 12 hours or as directed by MD     metoprolol succinate 25 MG 24 hr tablet  Commonly known as: TOPROL-XL  Take 25 mg by mouth once daily.     ONE DAILY MULTIVITAMIN per tablet  Generic drug: multivitamin  Take 1 tablet by mouth once daily.     potassium chloride 10 MEQ Tbsr  Commonly known as: KLOR-CON  Take 10 mEq by mouth once daily.     rosuvastatin 40 MG Tab  Commonly known as: CRESTOR  Take 40 mg by mouth every evening.     tamsulosin 0.4 mg Cap  Commonly known as: FLOMAX  Take 1 capsule (0.4 mg total) by mouth once daily.     VITAMIN C 500 MG tablet  Generic drug: ascorbic acid (vitamin C)  Take 500 mg by mouth 2 (two) times daily.            STOP taking these medications      amLODIPine 10 MG tablet  Commonly known as: NORVASC                    _________________________________  Jose L Evans III, MD  09/22/2023

## 2023-09-22 NOTE — TELEPHONE ENCOUNTER
Our office received a referral to evaluate this patient for decompensated heart failure. Is there another diagnosis that you would like us to evaluate patient for as we do not treat heart failure.  Thank you Ezekiel Olivas RN Hem/Onc [facility] [place]

## 2023-09-22 NOTE — NURSING
[facility], [facility]  Nurses Note -- 4 Eyes      9/21/2023       Skin assessed on: Q Shift      [] No Pressure Injuries Present    []Prevention Measures Documented    [x] Yes LDA  for Pressure Injury Previously documented     [] Yes New Pressure Injury Discovered   [] LDA for New Pressure Injury Added      Attending RN:  AMAIRANI PADILLA RN     Second RN:  Ambrosio COOK

## 2023-09-22 NOTE — PLAN OF CARE
Problem: Adult Inpatient Plan of Care  Goal: Plan of Care Review  Outcome: Met  Goal: Patient-Specific Goal (Individualized)  Outcome: Met  Goal: Absence of Hospital-Acquired Illness or Injury  Outcome: Met  Goal: Optimal Comfort and Wellbeing  Outcome: Met  Goal: Readiness for Transition of Care  Outcome: Met     Problem: Diabetes Comorbidity  Goal: Blood Glucose Level Within Targeted Range  Outcome: Met     Problem: Adjustment to Illness (Sepsis/Septic Shock)  Goal: Optimal Coping  Outcome: Met     Problem: Bleeding (Sepsis/Septic Shock)  Goal: Absence of Bleeding  Outcome: Met     Problem: Glycemic Control Impaired (Sepsis/Septic Shock)  Goal: Blood Glucose Level Within Desired Range  Outcome: Met     Problem: Infection Progression (Sepsis/Septic Shock)  Goal: Absence of Infection Signs and Symptoms  Outcome: Met     Problem: Nutrition Impaired (Sepsis/Septic Shock)  Goal: Optimal Nutrition Intake  Outcome: Met     Problem: Fluid and Electrolyte Imbalance (Acute Kidney Injury/Impairment)  Goal: Fluid and Electrolyte Balance  Outcome: Met     Problem: Oral Intake Inadequate (Acute Kidney Injury/Impairment)  Goal: Optimal Nutrition Intake  Outcome: Met     Problem: Renal Function Impairment (Acute Kidney Injury/Impairment)  Goal: Effective Renal Function  Outcome: Met     Problem: Impaired Wound Healing  Goal: Optimal Wound Healing  Outcome: Met     Problem: Skin Injury Risk Increased  Goal: Skin Health and Integrity  Outcome: Met     Problem: Coping Ineffective  Goal: Effective Coping  Outcome: Met

## 2023-09-22 NOTE — ASSESSMENT & PLAN NOTE
"9/22/2023  - interval chart reviewed; conflicting information found from information provided by pt yesterday regarding living independently; per CM notes pt lives in halfway nursing home; upon visit today shared possible discharge "home" and pt quickly answered that he doesn't have a home and lives in facility; clarified conversation from yesterday and pt reported he was confused and was discussing events before nursing home   - allowed pt to share events of care since yesterday's visit and experience with MARIA; he was relieved to find out that prior possible mass was not seen on MARIA   - despite minor confusion in discussion yesterday pt has capacity for appointing preferred MPOA (discussed with hospital primary Dr. Evans who agreed); pt again confirms his wishes for jero Abraham to be MPOA; MPOA form completed naming Leigh Evans (jero) as preferred MPOA and was witnessed by JOYCE Cox; additional copy made and placed in pts chart to allow for one to be sent to NH and one to be sent for scanning to EMR   - emotional support provided; allowed time for questions/concerns, all addressed   - ongoing communication with MDT     9/21/2023 - Consult   - consult received; interval chart reviewed in detail; palliative team dicussed pt with hospital primary, Dr. Evans   - met with patient at bedside; introduction to palliative medicine team and role in current care and admission   - learned more about pt outside of current admission; he lives independently at home; requires walker for ambulation and prior to recently hasn't had difficulty performing ADLs independently at home (such as prepare simple meals and light chores), but got increasingly difficult due to weakness which lead to current admission   - Leigh nogueira, is pt's preferred HCPOA if he were to lack capacity in the future; denies spouse, children, or living siblings; offered completion of HCPOA documentation prior to discharge which pt wishes to complete   - " "GOC/ACP discussion; pt's overall GOC is continued full treatment and to extend life as long as possible as long as he continues to have a good quality of life; shares he would have to think further about wishes if things were to get worse or if he becomes bed bound; discussed different scenarios to consider and to discuss with preferred Leigh AVALOS, expressing importance of her understanding his wishes   - discussed full code status, which does align with pt's wishes currently  - pt shared his concern for the continued "bad news" he continues to receive about his health; pt seems to have limited understanding of the details of some of his co-morbidities but seems to understand general/large concepts of his conditions; attempted to review concepts of pt's conditions related to anemia, CHF, and kidney function; encouraged questions to specialists to help clarify and understand his diagnoses   - would benefit from continued outpatient palliative involvement for further understanding multiple co-morbidities/life-limiting conditions with either palliative clinic or home palliative program   - plan to further discuss palliative follow up preference and complete MPOA form with pt in follow  - offered to call pt's Leigh nogueira, for update , which was attempted but unable to reach; will continued to attempt to reach   - emotional support provided   - Allowed time for questions/concerns; all addressed; expressed availability of myself/palliative team for additional questions/concerns   "

## 2023-09-22 NOTE — PLAN OF CARE
Zak with the flow center stated DEONTE will not  until 10 pm or later.     LEI spoke to La with [facility] and she stated they will accept pt.Nurse notified.

## 2023-09-22 NOTE — NURSING
[facility], [facility] [place]  Nurses Note -- 4 Eyes      9/22/2023       Skin assessed on: Q Shift      [x] No Pressure Injuries Present    [x]Prevention Measures Documented    [] Yes LDA  for Pressure Injury Previously documented     [] Yes New Pressure Injury Discovered   [] LDA for New Pressure Injury Added      Attending RN:  Ambrosio Hook RN     Second RN:  JOYCE Paul

## 2023-09-22 NOTE — NURSING
Gave handoff report to Nurse Veliz ( [facility]).waiting for the transport.

## 2023-09-22 NOTE — PROGRESS NOTES
"[facility] - Telemetry  Palliative Medicine  Progress Note    Patient Name: Chato Bowie  MRN: [id number]  Admission Date: 9/19/2023  Hospital Length of Stay: 1 days  Code Status: Full Code   Attending Provider: Jose L Evans III, MD  Consulting Provider: Veena Gilliam NP  Primary Care Physician: Irlanda Valenzuela -  Principal Problem:Chest pain    Patient information was obtained from patient and primary team.      Assessment/Plan:   Advance Care Planning     Palliative Care  ACP (advance care planning)  9/22/2023  - interval chart reviewed; conflicting information found from information provided by pt yesterday regarding living independently; per CM notes [facility]; upon visit today shared possible discharge "home" and pt quickly answered that he doesn't have a home and lives in facility; clarified conversation from yesterday and pt reported he was confused and was discussing events before nursing home   - allowed pt to share events of care since yesterday's visit and experience with [facility]; he was relieved to find out that prior possible mass was not seen on [facility]   - despite minor confusion in discussion yesterday pt has capacity for appointing preferred MPOA (discussed with hospital primary Dr. Evans who agreed); pt again confirms his wishes for jero Abraham to be MPOA; MPOA form completed naming Leigh Evans (jero) as preferred MPOA and was witnessed by JOYCE Cox; additional copy made and placed in pts chart to allow for one to be sent to NH and one to be sent for scanning to EMR   - emotional support provided; allowed time for questions/concerns, all addressed   - ongoing communication with MDT     9/21/2023 - Consult   - consult received; interval chart reviewed in detail; palliative team dicussed pt with hospital primary, Dr. Evans   - met with patient at bedside; introduction to palliative medicine team and role in current care and admission   - learned more about pt outside of " "current admission; he lives independently at home; requires walker for ambulation and prior to recently hasn't had difficulty performing ADLs independently at home (such as prepare simple meals and light chores), but got increasingly difficult due to weakness which lead to current admission   - nieceLeigh, is pt's preferred HCPOA if he were to lack capacity in the future; denies spouse, children, or living siblings; offered completion of HCPOA documentation prior to discharge which pt wishes to complete   - GOC/ACP discussion; pt's overall GOC is continued full treatment and to extend life as long as possible as long as he continues to have a good quality of life; shares he would have to think further about wishes if things were to get worse or if he becomes bed bound; discussed different scenarios to consider and to discuss with preferred HCPOA, Leigh, expressing importance of her understanding his wishes   - discussed full code status, which does align with pt's wishes currently  - pt shared his concern for the continued "bad news" he continues to receive about his health; pt seems to have limited understanding of the details of some of his co-morbidities but seems to understand general/large concepts of his conditions; attempted to review concepts of pt's conditions related to anemia, CHF, and kidney function; encouraged questions to specialists to help clarify and understand his diagnoses   - would benefit from continued outpatient palliative involvement for further understanding multiple co-morbidities/life-limiting conditions with either palliative clinic or home palliative program   - plan to further discuss palliative follow up preference and complete MPOA form with pt in follow  - offered to call pt's nieceLeigh, for update , which was attempted but unable to reach; will continued to attempt to reach   - emotional support provided   - Allowed time for questions/concerns; all addressed; expressed " availability of myself/palliative team for additional questions/concerns     Cardiac/Vascular  Acute on chronic diastolic congestive heart failure  - 9/19 Echo: EF 55-60%, Grade III diastolic dysfunction, pulm HTN  - edema on admit, diuresing per primary team management    - discussed trajectory of life-limiting condition  - cardiology following; [facility]IA yesterday   - continued management per primary team      Renal/  Acute kidney injury superimposed on CKD  - chronic history and acute worsening noted  - discussed potential trajectory of CKD and GOC regarding HD in the future; would consider HD if needed in the future   - continued management per primary team     Oncology  Anemia  - also with melena; GI following with eval for IP vs OP endoscopy   - on eliquis at home for a flutter, held on admit for potential bleeding   - required pRBC this admission; hgb currently >7 following transfusion   - continued management per primary team     Endocrine  Type 2 diabetes mellitus with kidney complication, with long-term current use of insulin  - chronic history noted; continued management per primary teams    Orthopedic  Multiple wounds  - sacral and foot wound noted; complicated by T2DM   - wound care following; continued management per wound care and primary teams     Possible discharge to NH planned per primary, if remains admitted past the weekend will follow-up with patient. Please contact us if you have any additional questions.    Total visit time: 55 minutes    > 50% of  35  min visit spent in chart review, face to face discussion of symptom assessment, coordination of care with other specialists, documentation, and discharge planning.    20 min ACP time spent: goals of care, emotional support, formulating and communicating prognosis, exploring burden/ benefit of various approaches of treatment.     Veena Gilliam NP  Palliative Medicine  [facility]  Subjective:     Chief Complaint:   Chief  "Complaint   Patient presents with    Chest Pain     X 1 hour. Given 325 of aspirin and 1 nitro SG.        HPI:   From H&P: " 72-year-old  male with medical history significant for congestive heart failure, coronary artery disease, type 2 diabetes mellitus, chronic kidney disease stage 4, hypertension, hyperlipidemia, multiple thyroid nodule and alcohol use disorder in remission was transferred from [facility] ED after presenting with chest pain of 1 hour duration, chest pain was said to be insidious in onset, non-exertional, as patient just finished breakfast and was watching tv when he noticed chest pain across his chest, described as stabbing/someone pounding on my chest, chest pain was nonradiating, said to be 10/10 at onset now 9/10, denied diaphoresis, no nausea, no vomiting although associated with palpitation.  He denied any cough, no change in baseline orthopnea of 2 pillows, no paroxysmal nocturnal dyspnea, although has 2+ of bilateral pitting pedal edema he denied fever, chills, rigors, denied urinary symptoms of dysuria, frequency, urgency, straining at micturition or hematuria.  No change in bowel habits, denied diarrhea, and constipation.  No previous history of tobacco use, voiced quitting alcohol sometimes back.  At presention to the outside hospital lab was significant for hypokalemia of 3.1, BUN creatinine of 69/3.5, with a baseline of 2.1-2.5, H&H 7.4/23.5.  Troponin was 0.053-0.055-0.056 essentially flat similar to previous, be in [facility] in the 1100, most recent prior to this was 487 in July 27, 2023.  CT abdomen and pelvis without contrast showed "bilateral pleural effusion diffuse body wall edema, smooth interlobular septal thickening which may relate to vascular congestion/mild interstitial edema".Pain was mildly improved with aspirin and nitro SG.Had similar chest pain in the past as well."    Palliative medicine consulted for goals of care discussion and advance " care planning; for details of visit, see advance care planning section of plan.         Hospital Course:  No notes on file    Medications:  Continuous Infusions:  Scheduled Meds:   allopurinoL  100 mg Oral Daily    amiodarone  200 mg Oral Daily    apixaban  5 mg Oral BID    atorvastatin  80 mg Oral Daily    ferrous sulfate  1 tablet Oral Daily    folic acid  1 mg Oral Daily    furosemide (LASIX) injection  40 mg Intravenous Q8H    gabapentin  100 mg Oral BID    insulin detemir U-100 (Levemir)  5 Units Subcutaneous BID    LIDOcaine  1 patch Transdermal Daily    metoprolol succinate  25 mg Oral Daily    multivitamin  1 tablet Oral Daily     PRN Meds:0.9%  NaCl infusion (for blood administration), acetaminophen, albuterol sulfate, aluminum-magnesium hydroxide-simethicone, dextrose 50%, dextrose 50%, glucagon (human recombinant), glucose, glucose, HYDROcodone-acetaminophen, insulin aspart U-100, melatonin, naloxone, ondansetron, sodium chloride 0.9%, sodium chloride 0.9%    Objective:     Vital Signs (Most Recent):  Temp: 98.1 °F (36.7 °C) (09/22/23 1116)  Pulse: 65 (09/22/23 1116)  Resp: 20 (09/22/23 1116)  BP: (!) 124/59 (09/22/23 1116)  SpO2: (!) 92 % (09/22/23 1116) Vital Signs (24h Range):  Temp:  [97.7 °F (36.5 °C)-98.8 °F (37.1 °C)] 98.1 °F (36.7 °C)  Pulse:  [16-65] 65  Resp:  [16-22] 20  SpO2:  [92 %-100 %] 92 %  BP: (117-129)/(56-74) 124/59     Weight: 103.9 kg (229 lb 0.9 oz)  Body mass index is 31.95 kg/m².       Physical Exam  Vitals and nursing note reviewed.   Constitutional:       General: He is not in acute distress.     Appearance: He is ill-appearing.   HENT:      Head: Normocephalic and atraumatic.   Pulmonary:      Effort: Pulmonary effort is normal. No respiratory distress.   Musculoskeletal:      Right lower leg: Edema present.      Left lower leg: Edema present.   Neurological:      Mental Status: He is alert and oriented to person, place, and time.   Psychiatric:         Mood and  Affect: Mood normal.         Thought Content: Thought content normal.     Advance Care Planning  Advance Directives:   Living Will: No    LaPOST: No    Do Not Resuscitate Status: No    Medical Power of : Yes (MPOA completed with patient listing pt's niece Leigh as MPOA)        Oral Declaration: Yes   Witnesses:  JOYCE Valente   Agent's Name:  Leigh Evans   Agent's Contact Number:  [phone]  Goals of Care: What is most important right now is to focus on avoiding the hospital, remaining as independent as possible, symptom/pain control, extending life as long as possible, even it it means sacrificing quality. Accordingly, we have decided that the best plan to meet the patient's goals includes continuing with treatment.     Significant Labs: All pertinent labs within the past 24 hours have been reviewed.  CBC:   Recent Labs   Lab 09/22/23  0501   WBC 9.40   HGB 7.4*   HCT 24.2*   MCV 81*        BMP:  Recent Labs   Lab 09/22/23  0501   *      K 3.2*      CO2 26   BUN 65*   CREATININE 3.3*   CALCIUM 8.9     LFT:  Lab Results   Component Value Date    AST 22 09/20/2023    ALKPHOS 87 09/20/2023    BILITOT 0.7 09/20/2023     Albumin:   Albumin   Date Value Ref Range Status   09/20/2023 2.1 (L) 3.5 - 5.2 g/dL Final     Protein:   Total Protein   Date Value Ref Range Status   09/20/2023 6.7 6.0 - 8.4 g/dL Final     Lactic acid:   Lab Results   Component Value Date    LACTATE 0.8 08/05/2023    LACTATE 0.7 07/30/2023     Significant Imaging: I have reviewed all pertinent imaging results/findings within the past 24 hours.      Veena Gilliam NP  Palliative Medicine  [facility]

## 2023-09-22 NOTE — CONSULTS
"2025:    Consult was conducted with the patient and his medical provider. Medical Provider reports that the patient has not received any visitors today. She also stated that the patient presents as being sad.  My visit with the patient found him asleep. After I aroused him he stated that he was having pains in his legs. He also stated that he receives medications, " sometimes it reduces the pain a little.:  Patient also disclosed that his Niece stated she would visit him, but if she comes, she will just show up."  Prayers were offered for the patient and his family.   The patient poured out his gratitude for the prayers generously and continually.  The Spiritual Care Team will continue to provide spiritual support to the patient and his family.        AUGUSTUS Mcfadden   [phone]  "

## 2023-09-22 NOTE — PLAN OF CARE
Case Management Assessment     PCP: Irlanda Valenzuela -    Pharmacy:   [facility] - [place], [place] - [address] [phone]  [place] [address]  Phone: [phone] Fax: [phone]    Patient Arrived From: [facility]  Existing Help at Home: NH staff    Barriers to Discharge: No barriers to discharge.     Discharge Plan:    A. Return to NH   B. Return to NH       09/22/23 0940   Discharge Assessment   Assessment Type Discharge Planning Assessment   Confirmed/corrected address, phone number and insurance Yes   Confirmed Demographics Correct on Facesheet   Source of Information health record   Communicated ELY with patient/caregiver Date not available/Unable to determine   Reason For Admission Chest pain   People in Home facility resident   Facility Arrived From: [facility]   Do you expect to return to your current living situation? Yes   Do you have help at home or someone to help you manage your care at home? Yes   Who are your caregiver(s) and their phone number(s)? NH staff Leigh Cesar (Relative)   [phone] (Work Phone)   Prior to hospitilization cognitive status: Alert/Oriented   Current cognitive status: Alert/Oriented   Equipment Currently Used at Home power chair;cane, straight;walker, rolling   Readmission within 30 days? No   Patient currently being followed by outpatient case management? No   Do you currently have service(s) that help you manage your care at home? No   Do you take prescription medications? Yes   Do you have prescription coverage? Yes   Coverage [employer]   Do you have any problems affording any of your prescribed medications? No   Is the patient taking medications as prescribed? yes   Who is going to help you get home at discharge? NH transportation   How do you get to doctors appointments? other (see comments)  (NH transportation)   Are you on dialysis? No   Do you take coumadin? No   DME Needed Upon Discharge  none   Transition  of Care Barriers None   Discharge Plan A Return to nursing home   Discharge Plan B Return to Nursing Home

## 2023-09-22 NOTE — SUBJECTIVE & OBJECTIVE
Medications:  Continuous Infusions:  Scheduled Meds:   allopurinoL  100 mg Oral Daily    amiodarone  200 mg Oral Daily    apixaban  5 mg Oral BID    atorvastatin  80 mg Oral Daily    ferrous sulfate  1 tablet Oral Daily    folic acid  1 mg Oral Daily    furosemide (LASIX) injection  40 mg Intravenous Q8H    gabapentin  100 mg Oral BID    insulin detemir U-100 (Levemir)  5 Units Subcutaneous BID    LIDOcaine  1 patch Transdermal Daily    metoprolol succinate  25 mg Oral Daily    multivitamin  1 tablet Oral Daily     PRN Meds:0.9%  NaCl infusion (for blood administration), acetaminophen, albuterol sulfate, aluminum-magnesium hydroxide-simethicone, dextrose 50%, dextrose 50%, glucagon (human recombinant), glucose, glucose, HYDROcodone-acetaminophen, insulin aspart U-100, melatonin, naloxone, ondansetron, sodium chloride 0.9%, sodium chloride 0.9%    Objective:     Vital Signs (Most Recent):  Temp: 98.1 °F (36.7 °C) (09/22/23 1116)  Pulse: 65 (09/22/23 1116)  Resp: 20 (09/22/23 1116)  BP: (!) 124/59 (09/22/23 1116)  SpO2: (!) 92 % (09/22/23 1116) Vital Signs (24h Range):  Temp:  [97.7 °F (36.5 °C)-98.8 °F (37.1 °C)] 98.1 °F (36.7 °C)  Pulse:  [16-65] 65  Resp:  [16-22] 20  SpO2:  [92 %-100 %] 92 %  BP: (117-129)/(56-74) 124/59     Weight: 103.9 kg (229 lb 0.9 oz)  Body mass index is 31.95 kg/m².       Physical Exam  Vitals and nursing note reviewed.   Constitutional:       General: He is not in acute distress.     Appearance: He is ill-appearing.   HENT:      Head: Normocephalic and atraumatic.   Pulmonary:      Effort: Pulmonary effort is normal. No respiratory distress.   Musculoskeletal:      Right lower leg: Edema present.      Left lower leg: Edema present.   Neurological:      Mental Status: He is alert and oriented to person, place, and time.   Psychiatric:         Mood and Affect: Mood normal.         Thought Content: Thought content normal.     Advance Care Planning   Advance Directives:   Living Will: No     LaPOST: No    Do Not Resuscitate Status: No    Medical Power of : Yes (MPOA completed with patient listing pt's niece Leigh as MPOA)        Oral Declaration: Yes   Witnesses:  JOYCE Valente   Agent's Name:  Leigh Evans   Agent's Contact Number:  [phone]  Goals of Care: What is most important right now is to focus on avoiding the hospital, remaining as independent as possible, symptom/pain control, extending life as long as possible, even it it means sacrificing quality. Accordingly, we have decided that the best plan to meet the patient's goals includes continuing with treatment.     Significant Labs: All pertinent labs within the past 24 hours have been reviewed.  CBC:   Recent Labs   Lab 09/22/23  0501   WBC 9.40   HGB 7.4*   HCT 24.2*   MCV 81*        BMP:  Recent Labs   Lab 09/22/23  0501   *      K 3.2*      CO2 26   BUN 65*   CREATININE 3.3*   CALCIUM 8.9     LFT:  Lab Results   Component Value Date    AST 22 09/20/2023    ALKPHOS 87 09/20/2023    BILITOT 0.7 09/20/2023     Albumin:   Albumin   Date Value Ref Range Status   09/20/2023 2.1 (L) 3.5 - 5.2 g/dL Final     Protein:   Total Protein   Date Value Ref Range Status   09/20/2023 6.7 6.0 - 8.4 g/dL Final     Lactic acid:   Lab Results   Component Value Date    LACTATE 0.8 08/05/2023    LACTATE 0.7 07/30/2023     Significant Imaging: I have reviewed all pertinent imaging results/findings within the past 24 hours.

## 2023-09-23 ENCOUNTER — HOSPITAL ENCOUNTER (INPATIENT)
Facility: HOSPITAL | Age: 73
LOS: 1 days | Discharge: HOME | DRG: 871 | End: 2023-09-27
Attending: EMERGENCY MEDICINE | Admitting: STUDENT IN AN ORGANIZED HEALTH CARE EDUCATION/TRAINING PROGRAM
Payer: MEDICARE

## 2023-09-23 VITALS
DIASTOLIC BLOOD PRESSURE: 55 MMHG | BODY MASS INDEX: 32.07 KG/M2 | OXYGEN SATURATION: 92 % | TEMPERATURE: 98 F | HEIGHT: 71 IN | RESPIRATION RATE: 18 BRPM | WEIGHT: 229.06 LBS | HEART RATE: 59 BPM | SYSTOLIC BLOOD PRESSURE: 109 MMHG

## 2023-09-23 DIAGNOSIS — N39.0 URINARY TRACT INFECTION WITHOUT HEMATURIA, SITE UNSPECIFIED: Primary | ICD-10-CM

## 2023-09-23 DIAGNOSIS — R07.9 CHEST PAIN: ICD-10-CM

## 2023-09-23 DIAGNOSIS — D72.829 LEUKOCYTOSIS: ICD-10-CM

## 2023-09-23 LAB
ALBUMIN SERPL BCP-MCNC: 2.3 G/DL (ref 3.5–5.2)
ALP SERPL-CCNC: 87 U/L (ref 55–135)
ALT SERPL W/O P-5'-P-CCNC: 25 U/L (ref 10–44)
ANION GAP SERPL CALC-SCNC: 10 MMOL/L (ref 8–16)
ANION GAP SERPL CALC-SCNC: 11 MMOL/L (ref 8–16)
AST SERPL-CCNC: 25 U/L (ref 10–40)
BACTERIA #/AREA URNS HPF: ABNORMAL /HPF
BASOPHILS # BLD AUTO: 0.09 K/UL (ref 0–0.2)
BASOPHILS NFR BLD: 0.5 % (ref 0–1.9)
BILIRUB SERPL-MCNC: 0.7 MG/DL (ref 0.1–1)
BILIRUB UR QL STRIP: NEGATIVE
BUN SERPL-MCNC: 64 MG/DL (ref 8–23)
BUN SERPL-MCNC: 66 MG/DL (ref 8–23)
CALCIUM SERPL-MCNC: 8.7 MG/DL (ref 8.7–10.5)
CALCIUM SERPL-MCNC: 9.1 MG/DL (ref 8.7–10.5)
CHLORIDE SERPL-SCNC: 100 MMOL/L (ref 95–110)
CHLORIDE SERPL-SCNC: 101 MMOL/L (ref 95–110)
CLARITY UR: ABNORMAL
CO2 SERPL-SCNC: 25 MMOL/L (ref 23–29)
CO2 SERPL-SCNC: 25 MMOL/L (ref 23–29)
COLOR UR: YELLOW
CREAT SERPL-MCNC: 3.3 MG/DL (ref 0.5–1.4)
CREAT SERPL-MCNC: 3.3 MG/DL (ref 0.5–1.4)
CTP QC/QA: YES
CTP QC/QA: YES
DIFFERENTIAL METHOD: ABNORMAL
EOSINOPHIL # BLD AUTO: 0.2 K/UL (ref 0–0.5)
EOSINOPHIL NFR BLD: 1.3 % (ref 0–8)
ERYTHROCYTE [DISTWIDTH] IN BLOOD BY AUTOMATED COUNT: 15.6 % (ref 11.5–14.5)
ERYTHROCYTE [DISTWIDTH] IN BLOOD BY AUTOMATED COUNT: 15.7 % (ref 11.5–14.5)
EST. GFR  (NO RACE VARIABLE): 19 ML/MIN/1.73 M^2
EST. GFR  (NO RACE VARIABLE): 19 ML/MIN/1.73 M^2
GLUCOSE SERPL-MCNC: 107 MG/DL (ref 70–110)
GLUCOSE SERPL-MCNC: 110 MG/DL (ref 70–110)
GLUCOSE UR QL STRIP: NEGATIVE
HCT VFR BLD AUTO: 24.3 % (ref 40–54)
HCT VFR BLD AUTO: 25.8 % (ref 40–54)
HGB BLD-MCNC: 7.4 G/DL (ref 14–18)
HGB BLD-MCNC: 7.9 G/DL (ref 14–18)
HGB UR QL STRIP: ABNORMAL
HYALINE CASTS #/AREA URNS LPF: 0 /LPF
IMM GRANULOCYTES # BLD AUTO: 0.11 K/UL (ref 0–0.04)
IMM GRANULOCYTES NFR BLD AUTO: 0.6 % (ref 0–0.5)
KETONES UR QL STRIP: NEGATIVE
LACTATE SERPL-SCNC: 0.9 MMOL/L (ref 0.5–2.2)
LEUKOCYTE ESTERASE UR QL STRIP: ABNORMAL
LYMPHOCYTES # BLD AUTO: 1.5 K/UL (ref 1–4.8)
LYMPHOCYTES NFR BLD: 8.8 % (ref 18–48)
MCH RBC QN AUTO: 24.5 PG (ref 27–31)
MCH RBC QN AUTO: 24.7 PG (ref 27–31)
MCHC RBC AUTO-ENTMCNC: 30.5 G/DL (ref 32–36)
MCHC RBC AUTO-ENTMCNC: 30.6 G/DL (ref 32–36)
MCV RBC AUTO: 80 FL (ref 82–98)
MCV RBC AUTO: 81 FL (ref 82–98)
MICROSCOPIC COMMENT: ABNORMAL
MONOCYTES # BLD AUTO: 1.4 K/UL (ref 0.3–1)
MONOCYTES NFR BLD: 8.1 % (ref 4–15)
NEUTROPHILS # BLD AUTO: 14.1 K/UL (ref 1.8–7.7)
NEUTROPHILS NFR BLD: 80.7 % (ref 38–73)
NITRITE UR QL STRIP: NEGATIVE
NRBC BLD-RTO: 0 /100 WBC
PH UR STRIP: 7 [PH] (ref 5–8)
PLATELET # BLD AUTO: 288 K/UL (ref 150–450)
PLATELET # BLD AUTO: 317 K/UL (ref 150–450)
PMV BLD AUTO: 8.5 FL (ref 9.2–12.9)
PMV BLD AUTO: 8.7 FL (ref 9.2–12.9)
POC MOLECULAR INFLUENZA A AGN: NEGATIVE
POC MOLECULAR INFLUENZA B AGN: NEGATIVE
POCT GLUCOSE: 118 MG/DL (ref 70–110)
POCT GLUCOSE: 164 MG/DL (ref 70–110)
POCT GLUCOSE: 171 MG/DL (ref 70–110)
POTASSIUM SERPL-SCNC: 3.3 MMOL/L (ref 3.5–5.1)
POTASSIUM SERPL-SCNC: 3.5 MMOL/L (ref 3.5–5.1)
PROT SERPL-MCNC: 7.1 G/DL (ref 6–8.4)
PROT UR QL STRIP: ABNORMAL
RBC # BLD AUTO: 3 M/UL (ref 4.6–6.2)
RBC # BLD AUTO: 3.23 M/UL (ref 4.6–6.2)
RBC #/AREA URNS HPF: 2 /HPF (ref 0–4)
SARS-COV-2 RDRP RESP QL NAA+PROBE: NEGATIVE
SODIUM SERPL-SCNC: 136 MMOL/L (ref 136–145)
SODIUM SERPL-SCNC: 136 MMOL/L (ref 136–145)
SP GR UR STRIP: 1.01 (ref 1–1.03)
SQUAMOUS #/AREA URNS HPF: 0 /HPF
URN SPEC COLLECT METH UR: ABNORMAL
UROBILINOGEN UR STRIP-ACNC: NEGATIVE EU/DL
WBC # BLD AUTO: 13.82 K/UL (ref 3.9–12.7)
WBC # BLD AUTO: 17.49 K/UL (ref 3.9–12.7)
WBC #/AREA URNS HPF: 46 /HPF (ref 0–5)
WBC CLUMPS URNS QL MICRO: ABNORMAL

## 2023-09-23 PROCEDURE — 36410 VNPNXR 3YR/> PHY/QHP DX/THER: CPT

## 2023-09-23 PROCEDURE — C1751 CATH, INF, PER/CENT/MIDLINE: HCPCS

## 2023-09-23 PROCEDURE — G0378 HOSPITAL OBSERVATION PER HR: HCPCS

## 2023-09-23 PROCEDURE — 87040 BLOOD CULTURE FOR BACTERIA: CPT | Performed by: EMERGENCY MEDICINE

## 2023-09-23 PROCEDURE — 99285 EMERGENCY DEPT VISIT HI MDM: CPT | Mod: 25

## 2023-09-23 PROCEDURE — 87086 URINE CULTURE/COLONY COUNT: CPT | Performed by: EMERGENCY MEDICINE

## 2023-09-23 PROCEDURE — 81000 URINALYSIS NONAUTO W/SCOPE: CPT | Performed by: EMERGENCY MEDICINE

## 2023-09-23 PROCEDURE — 83605 ASSAY OF LACTIC ACID: CPT | Performed by: EMERGENCY MEDICINE

## 2023-09-23 PROCEDURE — 87502 INFLUENZA DNA AMP PROBE: CPT

## 2023-09-23 PROCEDURE — 25000003 PHARM REV CODE 250: Performed by: STUDENT IN AN ORGANIZED HEALTH CARE EDUCATION/TRAINING PROGRAM

## 2023-09-23 PROCEDURE — 36415 COLL VENOUS BLD VENIPUNCTURE: CPT | Performed by: STUDENT IN AN ORGANIZED HEALTH CARE EDUCATION/TRAINING PROGRAM

## 2023-09-23 PROCEDURE — 85027 COMPLETE CBC AUTOMATED: CPT | Performed by: STUDENT IN AN ORGANIZED HEALTH CARE EDUCATION/TRAINING PROGRAM

## 2023-09-23 PROCEDURE — 25000003 PHARM REV CODE 250: Performed by: EMERGENCY MEDICINE

## 2023-09-23 PROCEDURE — 96365 THER/PROPH/DIAG IV INF INIT: CPT

## 2023-09-23 PROCEDURE — 85025 COMPLETE CBC W/AUTO DIFF WBC: CPT | Performed by: EMERGENCY MEDICINE

## 2023-09-23 PROCEDURE — 87186 SC STD MICRODIL/AGAR DIL: CPT | Performed by: EMERGENCY MEDICINE

## 2023-09-23 PROCEDURE — 51798 US URINE CAPACITY MEASURE: CPT

## 2023-09-23 PROCEDURE — 96366 THER/PROPH/DIAG IV INF ADDON: CPT

## 2023-09-23 PROCEDURE — 96376 TX/PRO/DX INJ SAME DRUG ADON: CPT

## 2023-09-23 PROCEDURE — 63600175 PHARM REV CODE 636 W HCPCS: Performed by: EMERGENCY MEDICINE

## 2023-09-23 PROCEDURE — 80053 COMPREHEN METABOLIC PANEL: CPT | Performed by: EMERGENCY MEDICINE

## 2023-09-23 PROCEDURE — 87077 CULTURE AEROBIC IDENTIFY: CPT | Performed by: EMERGENCY MEDICINE

## 2023-09-23 PROCEDURE — 80048 BASIC METABOLIC PNL TOTAL CA: CPT | Performed by: STUDENT IN AN ORGANIZED HEALTH CARE EDUCATION/TRAINING PROGRAM

## 2023-09-23 PROCEDURE — 96375 TX/PRO/DX INJ NEW DRUG ADDON: CPT

## 2023-09-23 PROCEDURE — 87088 URINE BACTERIA CULTURE: CPT | Performed by: EMERGENCY MEDICINE

## 2023-09-23 PROCEDURE — 63600175 PHARM REV CODE 636 W HCPCS: Performed by: STUDENT IN AN ORGANIZED HEALTH CARE EDUCATION/TRAINING PROGRAM

## 2023-09-23 PROCEDURE — 96372 THER/PROPH/DIAG INJ SC/IM: CPT | Performed by: EMERGENCY MEDICINE

## 2023-09-23 PROCEDURE — 87635 SARS-COV-2 COVID-19 AMP PRB: CPT | Performed by: EMERGENCY MEDICINE

## 2023-09-23 PROCEDURE — A4216 STERILE WATER/SALINE, 10 ML: HCPCS | Performed by: EMERGENCY MEDICINE

## 2023-09-23 RX ORDER — GLUCAGON 1 MG
1 KIT INJECTION
Status: DISCONTINUED | OUTPATIENT
Start: 2023-09-23 | End: 2023-09-27 | Stop reason: HOSPADM

## 2023-09-23 RX ORDER — ALBUTEROL SULFATE 2.5 MG/.5ML
2.5 SOLUTION RESPIRATORY (INHALATION) EVERY 6 HOURS PRN
Status: DISCONTINUED | OUTPATIENT
Start: 2023-09-23 | End: 2023-09-27 | Stop reason: HOSPADM

## 2023-09-23 RX ORDER — POTASSIUM CHLORIDE 20 MEQ/1
40 TABLET, EXTENDED RELEASE ORAL ONCE
Status: DISCONTINUED | OUTPATIENT
Start: 2023-09-23 | End: 2023-09-23 | Stop reason: HOSPADM

## 2023-09-23 RX ORDER — AMIODARONE HYDROCHLORIDE 200 MG/1
200 TABLET ORAL DAILY
Status: DISCONTINUED | OUTPATIENT
Start: 2023-09-23 | End: 2023-09-27 | Stop reason: HOSPADM

## 2023-09-23 RX ORDER — FUROSEMIDE 10 MG/ML
40 INJECTION INTRAMUSCULAR; INTRAVENOUS 3 TIMES DAILY
Status: DISCONTINUED | OUTPATIENT
Start: 2023-09-23 | End: 2023-09-24

## 2023-09-23 RX ORDER — IBUPROFEN 200 MG
16 TABLET ORAL
Status: DISCONTINUED | OUTPATIENT
Start: 2023-09-23 | End: 2023-09-27 | Stop reason: HOSPADM

## 2023-09-23 RX ORDER — ACETAMINOPHEN 325 MG/1
650 TABLET ORAL EVERY 8 HOURS PRN
Status: DISCONTINUED | OUTPATIENT
Start: 2023-09-23 | End: 2023-09-27 | Stop reason: HOSPADM

## 2023-09-23 RX ORDER — ASCORBIC ACID 500 MG
500 TABLET ORAL 2 TIMES DAILY
Status: DISCONTINUED | OUTPATIENT
Start: 2023-09-23 | End: 2023-09-27

## 2023-09-23 RX ORDER — PEN NEEDLE, DIABETIC, SAFETY 30 GX3/16"
NEEDLE, DISPOSABLE MISCELLANEOUS
Status: ON HOLD | COMMUNITY
Start: 2023-08-17 | End: 2023-09-27 | Stop reason: HOSPADM

## 2023-09-23 RX ORDER — HYDRALAZINE HYDROCHLORIDE 50 MG/1
TABLET, FILM COATED ORAL
Status: ON HOLD | COMMUNITY
Start: 2023-08-14 | End: 2023-09-27 | Stop reason: HOSPADM

## 2023-09-23 RX ORDER — TAMSULOSIN HYDROCHLORIDE 0.4 MG/1
0.4 CAPSULE ORAL DAILY
Status: DISCONTINUED | OUTPATIENT
Start: 2023-09-23 | End: 2023-09-27 | Stop reason: HOSPADM

## 2023-09-23 RX ORDER — LOSARTAN POTASSIUM AND HYDROCHLOROTHIAZIDE 12.5; 5 MG/1; MG/1
1 TABLET ORAL
Status: ON HOLD | COMMUNITY
Start: 2023-09-01 | End: 2023-09-27 | Stop reason: HOSPADM

## 2023-09-23 RX ORDER — NALOXONE HCL 0.4 MG/ML
0.02 VIAL (ML) INJECTION
Status: DISCONTINUED | OUTPATIENT
Start: 2023-09-23 | End: 2023-09-27 | Stop reason: HOSPADM

## 2023-09-23 RX ORDER — SODIUM CHLORIDE 0.9 % (FLUSH) 0.9 %
10 SYRINGE (ML) INJECTION
Status: DISCONTINUED | OUTPATIENT
Start: 2023-09-23 | End: 2023-09-27 | Stop reason: HOSPADM

## 2023-09-23 RX ORDER — ALLOPURINOL 100 MG/1
100 TABLET ORAL DAILY
Status: DISCONTINUED | OUTPATIENT
Start: 2023-09-23 | End: 2023-09-27 | Stop reason: HOSPADM

## 2023-09-23 RX ORDER — IBUPROFEN 200 MG
24 TABLET ORAL
Status: DISCONTINUED | OUTPATIENT
Start: 2023-09-23 | End: 2023-09-27 | Stop reason: HOSPADM

## 2023-09-23 RX ORDER — FUROSEMIDE 10 MG/ML
40 INJECTION INTRAMUSCULAR; INTRAVENOUS 2 TIMES DAILY
Status: DISCONTINUED | OUTPATIENT
Start: 2023-09-23 | End: 2023-09-23

## 2023-09-23 RX ORDER — INSULIN ASPART 100 [IU]/ML
0-5 INJECTION, SOLUTION INTRAVENOUS; SUBCUTANEOUS
Status: DISCONTINUED | OUTPATIENT
Start: 2023-09-23 | End: 2023-09-27 | Stop reason: HOSPADM

## 2023-09-23 RX ORDER — DOXYCYCLINE HYCLATE 100 MG
100 TABLET ORAL EVERY 12 HOURS
Status: DISCONTINUED | OUTPATIENT
Start: 2023-09-23 | End: 2023-09-26

## 2023-09-23 RX ORDER — ACETAMINOPHEN 325 MG/1
650 TABLET ORAL EVERY 4 HOURS PRN
Status: DISCONTINUED | OUTPATIENT
Start: 2023-09-23 | End: 2023-09-27 | Stop reason: HOSPADM

## 2023-09-23 RX ORDER — METOLAZONE 5 MG/1
TABLET ORAL
Status: ON HOLD | COMMUNITY
Start: 2023-08-16 | End: 2023-09-27 | Stop reason: SDUPTHER

## 2023-09-23 RX ORDER — CALAMINE PLUS PRAMOXINE HCL .345; 2.72; 24.16 MG/116G; MG/116G; MG/116G
AEROSOL, SPRAY TOPICAL
Status: ON HOLD | COMMUNITY
Start: 2023-07-10 | End: 2023-09-27 | Stop reason: HOSPADM

## 2023-09-23 RX ORDER — ATORVASTATIN CALCIUM 10 MG/1
20 TABLET, FILM COATED ORAL DAILY
Status: DISCONTINUED | OUTPATIENT
Start: 2023-09-23 | End: 2023-09-27 | Stop reason: HOSPADM

## 2023-09-23 RX ORDER — SODIUM CHLORIDE 0.9 % (FLUSH) 0.9 %
10 SYRINGE (ML) INJECTION EVERY 6 HOURS
Status: DISCONTINUED | OUTPATIENT
Start: 2023-09-23 | End: 2023-09-27 | Stop reason: HOSPADM

## 2023-09-23 RX ORDER — METOPROLOL SUCCINATE 25 MG/1
25 TABLET, EXTENDED RELEASE ORAL DAILY
Status: DISCONTINUED | OUTPATIENT
Start: 2023-09-23 | End: 2023-09-27 | Stop reason: HOSPADM

## 2023-09-23 RX ORDER — HYDROCODONE BITARTRATE AND ACETAMINOPHEN 5; 325 MG/1; MG/1
1 TABLET ORAL EVERY 6 HOURS PRN
Status: DISCONTINUED | OUTPATIENT
Start: 2023-09-23 | End: 2023-09-25

## 2023-09-23 RX ORDER — SODIUM CHLOR/HYPOCHLOROUS ACID 0.033 %
SOLUTION, IRRIGATION IRRIGATION
Status: ON HOLD | COMMUNITY
Start: 2023-08-17 | End: 2023-09-27 | Stop reason: HOSPADM

## 2023-09-23 RX ADMIN — APIXABAN 5 MG: 5 TABLET, FILM COATED ORAL at 08:09

## 2023-09-23 RX ADMIN — TAMSULOSIN HYDROCHLORIDE 0.4 MG: 0.4 CAPSULE ORAL at 12:09

## 2023-09-23 RX ADMIN — DOXYCYCLINE HYCLATE 100 MG: 100 TABLET, COATED ORAL at 01:09

## 2023-09-23 RX ADMIN — CEFTRIAXONE 1 G: 1 INJECTION, POWDER, FOR SOLUTION INTRAMUSCULAR; INTRAVENOUS at 12:09

## 2023-09-23 RX ADMIN — AMIODARONE HYDROCHLORIDE 200 MG: 200 TABLET ORAL at 12:09

## 2023-09-23 RX ADMIN — CEFTRIAXONE 1 G: 1 INJECTION, POWDER, FOR SOLUTION INTRAMUSCULAR; INTRAVENOUS at 02:09

## 2023-09-23 RX ADMIN — FUROSEMIDE 40 MG: 10 INJECTION, SOLUTION INTRAVENOUS at 01:09

## 2023-09-23 RX ADMIN — CEFTRIAXONE 2 G: 2 INJECTION, POWDER, FOR SOLUTION INTRAMUSCULAR; INTRAVENOUS at 11:09

## 2023-09-23 RX ADMIN — OXYCODONE HYDROCHLORIDE AND ACETAMINOPHEN 500 MG: 500 TABLET ORAL at 12:09

## 2023-09-23 RX ADMIN — FUROSEMIDE 40 MG: 10 INJECTION, SOLUTION INTRAVENOUS at 08:09

## 2023-09-23 RX ADMIN — Medication 10 ML: at 06:09

## 2023-09-23 RX ADMIN — APIXABAN 5 MG: 5 TABLET, FILM COATED ORAL at 12:09

## 2023-09-23 RX ADMIN — DOXYCYCLINE HYCLATE 100 MG: 100 TABLET, COATED ORAL at 08:09

## 2023-09-23 RX ADMIN — ATORVASTATIN CALCIUM 20 MG: 10 TABLET, FILM COATED ORAL at 12:09

## 2023-09-23 RX ADMIN — ALLOPURINOL 100 MG: 100 TABLET ORAL at 01:09

## 2023-09-23 RX ADMIN — OXYCODONE HYDROCHLORIDE AND ACETAMINOPHEN 500 MG: 500 TABLET ORAL at 08:09

## 2023-09-23 RX ADMIN — METOPROLOL SUCCINATE 25 MG: 25 TABLET, EXTENDED RELEASE ORAL at 12:09

## 2023-09-23 NOTE — Clinical Note
Diagnosis: Leukocytosis [200030]   Future Attending Provider: BIRDIE YOUSSEF III [[id number]]   Requested Bed Type: Standard [1]   Admitting Provider:: BLAS PATHAK JR [[id number]]   Special Needs:: No Special Needs [1]

## 2023-09-23 NOTE — ED TRIAGE NOTES
Pt presents to the ED via EMS for abnormal labs from healthcare facility. Pt was discharged from our facility yesterday. Pt states the nurse at his nursing home would not tell him which lab it was. Upon investigation, it appears the pt has an elevated WBC count..other labs appear to be unchanged from yesterday. Pt has no complaints other than chronic left knee pain. Pt is alert and oriented, afebrile, vs wdl and in nad at this time.

## 2023-09-23 NOTE — ASSESSMENT & PLAN NOTE
Patient's FSGs are controlled on current medication regimen.  Last A1c reviewed-   Lab Results   Component Value Date    HGBA1C 5.5 09/20/2023     Most recent fingerstick glucose reviewed-   Recent Labs   Lab 09/22/23  1622 09/22/23  1945   POCTGLUCOSE 168* 177*     Current correctional scale  Low   anti-hyperglycemic dose as follows-   Antihyperglycemics (From admission, onward)    Start     Stop Route Frequency Ordered    09/23/23 1251  insulin aspart U-100 pen 0-5 Units         -- SubQ Before meals & nightly PRN 09/23/23 1152        Hold Oral hypoglycemics while patient is in the hospital.

## 2023-09-23 NOTE — NURSING
[facility]  Nurses Note -- 4 Eyes      9/23/2023       Skin assessed on: Q Shift      [] No Pressure Injuries Present    []Prevention Measures Documented    [x] Yes LDA  for Pressure Injury Previously documented     [] Yes New Pressure Injury Discovered   [] LDA for New Pressure Injury Added      Attending RN:  Zabrina Jacobson RN     Second RN:  IVONNE Woods

## 2023-09-23 NOTE — PHARMACY MED REC
"Admission Medication History     The home medication history was taken by Toma Soni.    You may go to "Admission" then "Reconcile Home Medications" tabs to review and/or act upon these items.     The home medication list has been updated by the Pharmacy department.   Please read ALL comments highlighted in yellow.   Please address this information as you see fit.    Feel free to contact us if you have any questions or require assistance.    Medications listed below were obtained from: Patient/family and Analytic software- [employer] and added to home medication:   Amitriptyline   Amlodipine   Losartan-HCTZ   Metolazone    The medication reconciliation was completed using [employer] because the patient was unfamiliar with medication and last dosage taken.      Toma Soni  [phone]                .          "

## 2023-09-23 NOTE — ED PROVIDER NOTES
Encounter Date: 9/23/2023    SCRIBE #1 NOTE: I, Kim Lam, am scribing for, and in the presence of,  Brian Hernandez Jr., MD. I have scribed the following portions of the note - Other sections scribed: HPI, ROS.       History     Chief Complaint   Patient presents with    Abnormal Lab     Pt to ED via EMS from [facility] for abnormal lab. EMS reports LPN at [facility] wouldn't tell him what the abnormal lab was. Pt only c/o L knee pain. Pt was here 09/19/23 and discharged yesterday.      CC: Abdnormal Labs     HPI: This is a 72 year old male with a PMHx of CHF, alcohol abuse, CAD, DM, HTN, HLD, and Rhabdomyolysis who presents to the ED via EMS from [facility] for evaluation of Abnormal Labs. Patient reoprts bilateral leg wounds, fever, and neck stiffness. Patient also reports making urine. Additional history obtained from independent historian: EMS personnel, reports that the LPN at the [facility] wouldn't tell the patient what the abnormal lab was.  Upon investigation, it appears the patient has an elevated WBC count. Other labs appear to be unchanged from yesterday. Patient has no complaints other than chronic left knee pain. No other alleviaitng or exacerbating factors. Patient denies chills, SOB, CP, abdominal pain, groin pain, nausea, emesis, cough, ear pain, sore throat, headache, or other associated symptoms. This is the extent of the patient's complaints in the ED. NKDA    Per review of charts, patient presented for chest pain on Sept. 19 and was discharged yesterday.  Patient stated his symptoms started approximately 1 hour after eating sausage and toast for breakfast.  He reports pressure-like pain across his chest that is been constant and not relieved with nitroglycerin.            The history is provided by the patient and the EMS personnel. No  was used.     Review of patient's allergies indicates:  No Known Allergies  Past Medical History:    Diagnosis Date    Acute congestive heart failure 3/20/2020    Alcohol abuse     Arthritis     Asthma attack 11/24/2021    Coronary artery disease     Diabetes mellitus     Hyperlipemia     Hypertension     Multiple thyroid nodules 6/14/2023    Rhabdomyolysis      Past Surgical History:   Procedure Laterality Date    ECHOCARDIOGRAM,TRANSESOPHAGEAL N/A 9/21/2023    Procedure: Transesophageal echo (MARIA) intra-procedure log documentation;  Surgeon: Luisana Rodríguez MD;  Location: [facility] CATH LAB;  Service: Cardiology;  Laterality: N/A;    EYE SURGERY      TRANSESOPHAGEAL ECHOCARDIOGRAM WITH POSSIBLE CARDIOVERSION (MARIA W/ POSS CARDIOVERSION) N/A 1/5/2023    Procedure: Transesophageal echo (MARIA) intra-procedure log documentation;  Surgeon: Indra Foote MD;  Location: [facility] CATH LAB;  Service: Cardiology;  Laterality: N/A;    TRANSESOPHAGEAL ECHOCARDIOGRAPHY N/A 9/21/2023    Procedure: ECHOCARDIOGRAM, TRANSESOPHAGEAL;  Surgeon: Luisana Rodríguez MD;  Location: [facility] CATH LAB;  Service: Cardiology;  Laterality: N/A;    TREATMENT OF CARDIAC ARRHYTHMIA N/A 12/7/2020    Procedure: Cardioversion or Defibrillation;  Surgeon: Indra Foote MD;  Location: [facility] CATH LAB;  Service: Cardiology;  Laterality: N/A;    TREATMENT OF CARDIAC ARRHYTHMIA N/A 12/30/2020    Procedure: Cardioversion or Defibrillation;  Surgeon: Tyrone Steen MD;  Location: [facility] CATH LAB;  Service: Cardiology;  Laterality: N/A;    TREATMENT OF CARDIAC ARRHYTHMIA N/A 1/5/2023    Procedure: Cardioversion or Defibrillation;  Surgeon: Indra Foote MD;  Location: [facility] CATH LAB;  Service: Cardiology;  Laterality: N/A;    VASCULAR SURGERY       Family History   Problem Relation Age of Onset    Hypertension Mother     Diabetes Mother     Diabetes Father     Hypertension Father     Diabetes Sister     Hypertension Sister      Social History     Tobacco Use    Smoking status: Never    Smokeless tobacco: Never   Substance Use Topics    Alcohol use: Not  Currently     Alcohol/week: 6.0 standard drinks of alcohol     Types: 6 Cans of beer per week     Comment: daily    Drug use: No     Review of Systems   Constitutional:  Positive for fever. Negative for chills and diaphoresis.   HENT:  Negative for ear pain, facial swelling and sore throat.    Eyes:  Negative for visual disturbance.   Respiratory:  Negative for cough and shortness of breath.    Cardiovascular:  Negative for chest pain.   Gastrointestinal:  Negative for abdominal pain, nausea and vomiting.   Genitourinary:  Negative for dysuria and hematuria.        (-) Groin Pain    Musculoskeletal:  Positive for neck stiffness. Negative for myalgias.        (+) Chronic Left Knee Pain   Skin:  Negative for rash.        (+) Bilateral Leg Wounds    Neurological:  Negative for headaches.   Psychiatric/Behavioral:  Negative for agitation.    All other systems reviewed and are negative.      Physical Exam     Initial Vitals [09/23/23 0847]   BP Pulse Resp Temp SpO2   (!) 124/57 66 (!) 24 98.2 °F (36.8 °C) 95 %      MAP       --         Physical Exam    Nursing note and vitals reviewed.  Constitutional: He is not diaphoretic. No distress.   HENT:   Head: Normocephalic and atraumatic.   Right Ear: External ear normal.   Left Ear: External ear normal.   Nose: Nose normal.   Oropharynx is dry.   Eyes: Conjunctivae and EOM are normal. Pupils are equal, round, and reactive to light. Right eye exhibits no discharge. Left eye exhibits no discharge. No scleral icterus.   Neck: Neck supple. No JVD present.   Neck is supple.  No nuchal rigidity.   Normal range of motion.  Cardiovascular:  Normal rate, regular rhythm, normal heart sounds and intact distal pulses.     Exam reveals no gallop and no friction rub.       No murmur heard.  Pulmonary/Chest: Breath sounds normal. No stridor. No respiratory distress. He has no wheezes. He has no rhonchi. He has no rales. He exhibits no tenderness.   No wheezes, rales, rhonchi.  Normal work  of breathing.   Abdominal: Abdomen is soft. Bowel sounds are normal. He exhibits no distension and no mass. There is no abdominal tenderness.   Patient's abdomen is soft and nondistended and nontender with normal bowel sounds. There is no rebound and no guarding.   Genitourinary:    Genitourinary Comments: No evidence of wounds or swelling or cellulitic changes to the penis or scrotum.  Condom catheter in place.     Musculoskeletal:         General: No tenderness or edema. Normal range of motion.      Cervical back: Normal range of motion and neck supple.      Comments: Bilateral lower extremities are symmetric.  There are superficial ulcers without evidence of drainage or surrounding erythema or warmth to bilateral heels.     Neurological: He is alert and oriented to person, place, and time. He has normal strength. No cranial nerve deficit or sensory deficit.   Skin: Skin is warm and dry. No rash noted. No erythema. No pallor.   Psychiatric: He has a normal mood and affect. His behavior is normal. Judgment and thought content normal.         ED Course   Procedures  Labs Reviewed   CBC W/ AUTO DIFFERENTIAL - Abnormal; Notable for the following components:       Result Value    WBC 17.49 (*)     RBC 3.23 (*)     Hemoglobin 7.9 (*)     Hematocrit 25.8 (*)     MCV 80 (*)     MCH 24.5 (*)     MCHC 30.6 (*)     RDW 15.7 (*)     MPV 8.7 (*)     Immature Granulocytes 0.6 (*)     Gran # (ANC) 14.1 (*)     Immature Grans (Abs) 0.11 (*)     Mono # 1.4 (*)     Gran % 80.7 (*)     Lymph % 8.8 (*)     All other components within normal limits   COMPREHENSIVE METABOLIC PANEL - Abnormal; Notable for the following components:    BUN 64 (*)     Creatinine 3.3 (*)     Albumin 2.3 (*)     eGFR 19 (*)     All other components within normal limits   URINALYSIS, REFLEX TO URINE CULTURE - Abnormal; Notable for the following components:    Appearance, UA Hazy (*)     Protein, UA 1+ (*)     Occult Blood UA 1+ (*)     Leukocytes, UA 3+ (*)      All other components within normal limits    Narrative:     Specimen Source->Urine   URINALYSIS MICROSCOPIC - Abnormal; Notable for the following components:    WBC, UA 46 (*)     WBC Clumps, UA Few (*)     Bacteria Moderate (*)     All other components within normal limits    Narrative:     Specimen Source->Urine   CULTURE, BLOOD   CULTURE, BLOOD   CULTURE, URINE   LACTIC ACID, PLASMA   SARS-COV-2 RDRP GENE   POCT INFLUENZA A/B MOLECULAR          Imaging Results              X-Ray Chest AP Portable (Final result)  Result time 09/23/23 09:44:22      Final result by Alize Evans MD (09/23/23 09:44:22)                   Impression:      As above.      Electronically signed by: Alize Evans MD  Date:    09/23/2023  Time:    09:44               Narrative:    EXAMINATION:  XR CHEST AP PORTABLE    CLINICAL HISTORY:  Elevated white blood cell count, unspecified    TECHNIQUE:  Single frontal view of the chest was performed.    COMPARISON:  09/19/2023    FINDINGS:  The heart is enlarged.  There is pulmonary vascular congestion with interstitial pulmonary edema.  Small bilateral pleural effusions are present.  Skeletal structures are intact.                                    X-Rays:   Independently Interpreted Readings:   Other Readings:  Chest x-ray reveals cardiomegaly and vascular congestion.    Medications   cefTRIAXone (ROCEPHIN) 1 g in LIDOcaine HCL 10 mg/ml (1%) 4 mL IM only syringe (has no administration in time range)     Medical Decision Making  This is the emergent evaluation of a 72-year-old male presents emergency department for evaluation of leukocytosis and fever.  Patient was admitted to the hospital for volume overload and diuresis.  He was discharged last night but the hospitalist than noted that he had had a fever and leukocytosis and called the nursing home to send the patient back for evaluation.  Differential diagnosis at the time of initial evaluation included, but was not limited to:   Pneumonia, urinary tract infection, wound infection.  Neck is supple the patient denies new headache.  He is chronically ill-appearing but is not lethargic or toxic appearing today.  No evidence of wound infections.  Abdomen is soft and nontender.  Lung sounds are clear.  Patient denies any new complaints.      Patient's chest x-ray reveals findings more consistent with vascular congestion from CHF.  However, atypical pneumonia could still be present.  Patient has an increased WBC with a left shift compared to most recent labs.  He has stable baseline anemia.  He has no new metabolic derangement of concern.  Urinalysis reveals evidence of urinary tract infection.  Culture sent.  Past urine culture reviewed.  Sensitive to ceftriaxone.  Patient is awaiting PICC line team to insert a PICC or midline as we were unable to obtain peripheral IV access.  I will order IM ceftriaxone with lidocaine in the meantime.  Blood cultures have been drawn.  I discussed the case with Dr. Evans who will take care of this patient in observation.    Amount and/or Complexity of Data Reviewed  External Data Reviewed: labs and notes.  Labs: ordered. Decision-making details documented in ED Course.  Radiology: ordered and independent interpretation performed. Decision-making details documented in ED Course.            Scribe Attestation:   Scribe #1: I performed the above scribed service and the documentation accurately describes the services I performed. I attest to the accuracy of the note.                   I, Brian Hernandez MD, personally performed the services described in this documentation. All medical record entries made by the scribe were at my direction and in my presence. I have reviewed the chart and agree that the record reflects my personal performance and is accurate and complete.      Clinical Impression:   Final diagnoses:  [D72.829] Leukocytosis  [N39.0] Urinary tract infection without hematuria, site unspecified  (Primary)        ED Disposition Condition    Observation Stable                Brian Hernandez Jr., MD  09/23/23 2140

## 2023-09-23 NOTE — ASSESSMENT & PLAN NOTE
This patient does have evidence of infective focus  My overall impression is sepsis.  Source: Respiratory and Urinary Tract  Antibiotics given-   Antibiotics (72h ago, onward)    Start     Stop Route Frequency Ordered    09/24/23 0000  cefTRIAXone (ROCEPHIN) 2 g in dextrose 5 % in water (D5W) 100 mL IVPB (MB+)         -- IV Every 24 hours (non-standard times) 09/23/23 1153    09/23/23 1300  doxycycline tablet 100 mg         -- Oral Every 12 hours 09/23/23 1153    09/23/23 1230  cefTRIAXone (ROCEPHIN) 1 g in dextrose 5 % in water (D5W) 100 mL IVPB (MB+)         -- IV Once 09/23/23 1153        Latest lactate reviewed-  Recent Labs   Lab 09/23/23  0930   LACTATE 0.9     Organ dysfunction indicated by Acute kidney injury    Fluid challenge Contraindicated- Fluid bolus is contraindicated in this patient due to Congestive Heart Failure     Post- resuscitation assessment No - Post resuscitation assessment not needed       Will Not start Pressors- Levophed for MAP of 65  Source control achieved by: Pt presenting with fever and leukocytosis with concerns for possible respiratory vs urinary infection.    -ceftriaxone/doxy for abx coverage  -urine and sputum cultures pending  -bc ordered

## 2023-09-23 NOTE — NURSING
ED transfer to Observation, Room 333.   Patient required three-person assistance to transfer from [facility] to bed.  Awake, alert and oriented x 3. Denies pain at this time.   Severe generalized edema.   BP elevated at 144/67.  Patient with sacral wound and wound to left heel.  Condom cath intact with 400 cc yellow urine in drainage bag.    Reviewed orders with patient.  Scheduled meds given.   Applied Telemetry box: [id number].    All safety measures activated.      Will continue to f/u.

## 2023-09-23 NOTE — ASSESSMENT & PLAN NOTE
Advance Care Planning     Date: 09/23/2023  Pt would like to be full code 2 minutes discussing acp. Palliative care followed pt.

## 2023-09-23 NOTE — NURSING
A Med transportation service enroute with patient to [facility].  Pt is pleasanty confused at intervals.  Denies any pain or discomforts at this time.

## 2023-09-23 NOTE — ASSESSMENT & PLAN NOTE
Pt with stable renal function.     -continue to monitor and renally dose medications  -outpatient nephrology f/u

## 2023-09-23 NOTE — CARE UPDATE
Pt discharge order was placed the previous day 9/22, but did not leave until 5:15 on 9/23 prior to shift change to go back to his nursing facility. On review of the pts overnight vitals and labs pt noted to have a fever and new leukocytosis. I canceled pts discharge and alerted nurse of change of plan, but was informed pt had already left the facility. I called pts nursing home ([facility]) and spoke to a nurse. I informed them of his white count and fever overnight and advised that pt should be brought back to the emergency room for re-evaluation if there was not a provider in house. Nursing staff endorsed understanding and informed me they would start the process to have pt re-evaluated.

## 2023-09-23 NOTE — PLAN OF CARE
Problem: Diabetes Comorbidity  Goal: Blood Glucose Level Within Targeted Range  Outcome: Ongoing, Progressing     Problem: Adjustment to Illness (Sepsis/Septic Shock)  Goal: Optimal Coping  Outcome: Ongoing, Progressing     Problem: Bleeding (Sepsis/Septic Shock)  Goal: Absence of Bleeding  Outcome: Ongoing, Progressing     Problem: Glycemic Control Impaired (Sepsis/Septic Shock)  Goal: Blood Glucose Level Within Desired Range  Outcome: Ongoing, Progressing     Problem: Infection Progression (Sepsis/Septic Shock)  Goal: Absence of Infection Signs and Symptoms  Outcome: Ongoing, Progressing     Problem: Nutrition Impaired (Sepsis/Septic Shock)  Goal: Optimal Nutrition Intake  Outcome: Ongoing, Progressing

## 2023-09-23 NOTE — HPI
72M with pmh of hfref, CAD, DM, HTN, HLD, a fib who presents back due to fever and leukocytosis. Pt discharge order was placed the previous day 9/22, but did not leave until 5:15 on 9/23 prior to shift change to go back to his nursing facility. On review of the pts overnight vitals and labs pt noted to have a fever and new leukocytosis. I canceled pts discharge and alerted nurse of change of plan, but was informed pt had already left the facility. I called pts nursing home ([facility]) and spoke to a nurse. I informed them of his white count and fever overnight and advised that pt should be brought back to the emergency room for re-evaluation if there was not a provider in house. Nursing staff endorsed understanding and informed me they would start the process to have pt re-evaluated. Currently pt states he is feeling ok, but does endorse burning with urination. Denies hematuria, abd pain, n/v/d, cp, ha, blurry vision, sob, cough at this time.

## 2023-09-23 NOTE — SUBJECTIVE & OBJECTIVE
Past Medical History:   Diagnosis Date    Acute congestive heart failure 3/20/2020    Alcohol abuse     Arthritis     Asthma attack 11/24/2021    Coronary artery disease     Diabetes mellitus     Hyperlipemia     Hypertension     Multiple thyroid nodules 6/14/2023    Rhabdomyolysis        Past Surgical History:   Procedure Laterality Date    ECHOCARDIOGRAM,TRANSESOPHAGEAL N/A 9/21/2023    Procedure: Transesophageal echo (MARIA) intra-procedure log documentation;  Surgeon: Luisana Rodríguez MD;  Location: [facility] CATH LAB;  Service: Cardiology;  Laterality: N/A;    EYE SURGERY      TRANSESOPHAGEAL ECHOCARDIOGRAM WITH POSSIBLE CARDIOVERSION (MARIA W/ POSS CARDIOVERSION) N/A 1/5/2023    Procedure: Transesophageal echo (MARIA) intra-procedure log documentation;  Surgeon: Indra Foote MD;  Location: [facility] CATH LAB;  Service: Cardiology;  Laterality: N/A;    TRANSESOPHAGEAL ECHOCARDIOGRAPHY N/A 9/21/2023    Procedure: ECHOCARDIOGRAM, TRANSESOPHAGEAL;  Surgeon: Luisana Rodríguez MD;  Location: [facility] CATH LAB;  Service: Cardiology;  Laterality: N/A;    TREATMENT OF CARDIAC ARRHYTHMIA N/A 12/7/2020    Procedure: Cardioversion or Defibrillation;  Surgeon: Indra Foote MD;  Location: [facility] CATH LAB;  Service: Cardiology;  Laterality: N/A;    TREATMENT OF CARDIAC ARRHYTHMIA N/A 12/30/2020    Procedure: Cardioversion or Defibrillation;  Surgeon: Tyrone Steen MD;  Location: [facility] CATH LAB;  Service: Cardiology;  Laterality: N/A;    TREATMENT OF CARDIAC ARRHYTHMIA N/A 1/5/2023    Procedure: Cardioversion or Defibrillation;  Surgeon: Indra Foote MD;  Location: [facility] CATH LAB;  Service: Cardiology;  Laterality: N/A;    VASCULAR SURGERY         Review of patient's allergies indicates:  No Known Allergies    Current Facility-Administered Medications on File Prior to Encounter   Medication    [COMPLETED] influenza 65up-adj (QUADRIVALENT ADJUVANTED PF) vaccine 0.5 mL    [DISCONTINUED] 0.9%  NaCl infusion (for blood  administration)    [DISCONTINUED] acetaminophen tablet 650 mg    [DISCONTINUED] albuterol sulfate nebulizer solution 2.5 mg    [DISCONTINUED] allopurinoL tablet 100 mg    [DISCONTINUED] aluminum-magnesium hydroxide-simethicone 200-200-20 mg/5 mL suspension 30 mL    [DISCONTINUED] amiodarone tablet 200 mg    [DISCONTINUED] apixaban tablet 5 mg    [DISCONTINUED] atorvastatin tablet 80 mg    [DISCONTINUED] dextrose 50% injection 12.5 g    [DISCONTINUED] dextrose 50% injection 25 g    [DISCONTINUED] ferrous sulfate tablet 1 each    [DISCONTINUED] folic acid tablet 1 mg    [DISCONTINUED] furosemide injection 40 mg    [DISCONTINUED] gabapentin capsule 100 mg    [DISCONTINUED] glucagon (human recombinant) injection 1 mg    [DISCONTINUED] glucose chewable tablet 16 g    [DISCONTINUED] glucose chewable tablet 24 g    [DISCONTINUED] HYDROcodone-acetaminophen 5-325 mg per tablet 1 tablet    [DISCONTINUED] insulin aspart U-100 pen 0-5 Units    [DISCONTINUED] insulin detemir U-100 (Levemir) pen 5 Units    [DISCONTINUED] LIDOcaine 5 % patch 1 patch    [DISCONTINUED] melatonin tablet 6 mg    [DISCONTINUED] metoprolol succinate (TOPROL-XL) 24 hr tablet 25 mg    [DISCONTINUED] multivitamin tablet    [DISCONTINUED] naloxone 0.4 mg/mL injection 0.02 mg    [DISCONTINUED] ondansetron injection 4 mg    [DISCONTINUED] potassium chloride SA CR tablet 40 mEq    [DISCONTINUED] sodium chloride 0.9% flush 10 mL    [DISCONTINUED] sodium chloride 0.9% flush 10 mL     Current Outpatient Medications on File Prior to Encounter   Medication Sig    albuterol (PROVENTIL/VENTOLIN HFA) 90 mcg/actuation inhaler Inhale 2 puffs into the lungs every 6 (six) hours as needed for Wheezing or Shortness of Breath.    allopurinoL (ZYLOPRIM) 100 MG tablet Take 1 tablet (100 mg total) by mouth once daily.    amiodarone (PACERONE) 200 MG Tab Take 200 mg by mouth once daily.    ELIQUIS 5 mg Tab Take 5 mg by mouth 2 (two) times daily.    folic acid (FOLVITE) 1 MG  "tablet Take 1 tablet (1 mg total) by mouth once daily.    furosemide (LASIX) 80 MG tablet Take 1 tablet (80 mg total) by mouth 2 (two) times daily.    gabapentin (NEURONTIN) 100 MG capsule Take 1 capsule (100 mg total) by mouth 2 (two) times daily.    hydrALAZINE (APRESOLINE) 50 MG tablet Take by mouth.    HYDROcodone-acetaminophen (NORCO) 5-325 mg per tablet Take 1 tablet by mouth every 8 (eight) hours as needed for Pain.    insulin detemir U-100, Levemir, 100 unit/mL (3 mL) SubQ InPn pen Inject 7 Units into the skin 2 (two) times daily.    LIDOcaine (LIDODERM) 5 % Place 2 patches onto the skin once daily. Remove & Discard patch within 12 hours or as directed by MD    losartan-hydrochlorothiazide 50-12.5 mg (HYZAAR) 50-12.5 mg per tablet Take 1 tablet by mouth.    metOLazone (ZAROXOLYN) 5 MG tablet Take by mouth.    metoprolol succinate (TOPROL-XL) 25 MG 24 hr tablet Take 25 mg by mouth once daily.    potassium chloride (KLOR-CON) 10 MEQ TbSR Take 10 mEq by mouth once daily.    rosuvastatin (CRESTOR) 40 MG Tab Take 40 mg by mouth every evening.    tamsulosin (FLOMAX) 0.4 mg Cap Take 1 capsule (0.4 mg total) by mouth once daily.    ascorbic acid, vitamin C, (VITAMIN C) 500 MG tablet Take 500 mg by mouth 2 (two) times daily.    BD AUTOSHIELD DUO PEN NEEDLE 30 gauge x 3/16" Ndle     CALAMINE PLUS, PRAMOX-CALAMIN, 1-8 % SprA SMARTSI liberally Topical 4 Times Daily PRN    ferrous sulfate (FEOSOL) 325 mg (65 mg iron) Tab tablet Take 325 mg by mouth once daily.    multivitamin (ONE DAILY MULTIVITAMIN) per tablet Take 1 tablet by mouth once daily.    VASHE WOUND THERAPY 0.033 % IrSl     [DISCONTINUED] benzonatate (TESSALON) 100 MG capsule Take 100 mg by mouth 3 (three) times daily as needed for Cough.    [DISCONTINUED] hydrALAZINE (APRESOLINE) 100 MG tablet Take 0.5 tablets (50 mg total) by mouth every 8 (eight) hours.     Family History       Problem Relation (Age of Onset)    Diabetes Mother, Father, Sister    " Hypertension Mother, Father, Sister          Tobacco Use    Smoking status: Never    Smokeless tobacco: Never   Substance and Sexual Activity    Alcohol use: Not Currently     Alcohol/week: 6.0 standard drinks of alcohol     Types: 6 Cans of beer per week     Comment: daily    Drug use: No    Sexual activity: Yes     Partners: Female     Review of Systems  Objective:     Vital Signs (Most Recent):  Temp: 98.5 °F (36.9 °C) (09/23/23 1330)  Pulse: 66 (09/23/23 1330)  Resp: 14 (09/23/23 1330)  BP: (!) 144/67 (09/23/23 1330)  SpO2: (!) 92 % (09/23/23 1330) Vital Signs (24h Range):  Temp:  [97.9 °F (36.6 °C)-101.1 °F (38.4 °C)] 98.5 °F (36.9 °C)  Pulse:  [59-67] 66  Resp:  [14-24] 14  SpO2:  [92 %-100 %] 92 %  BP: (109-146)/(55-67) 144/67     Weight: 95.3 kg (210 lb)  Body mass index is 29.29 kg/m².     Physical Exam  Vitals reviewed.   Constitutional:       General: He is not in acute distress.     Appearance: He is not toxic-appearing.   HENT:      Head: Normocephalic and atraumatic.      Mouth/Throat:      Mouth: Mucous membranes are moist.      Pharynx: Oropharynx is clear.   Eyes:      General: No scleral icterus.     Extraocular Movements: Extraocular movements intact.   Cardiovascular:      Rate and Rhythm: Normal rate and regular rhythm.   Pulmonary:      Effort: Pulmonary effort is normal. No respiratory distress (no o2. lying back in bed comfortably).   Abdominal:      Palpations: Abdomen is soft.      Tenderness: There is no abdominal tenderness. There is no guarding or rebound.   Musculoskeletal:         General: Swelling (bilateral pitting edema similar to previous day) present.      Cervical back: Neck supple. No rigidity.   Skin:     General: Skin is warm and dry.   Neurological:      General: No focal deficit present.      Mental Status: He is alert and oriented to person, place, and time.   Psychiatric:         Mood and Affect: Mood normal.         Behavior: Behavior normal.                Significant  Labs: All pertinent labs within the past 24 hours have been reviewed.    Significant Imaging: I have reviewed all pertinent imaging results/findings within the past 24 hours.

## 2023-09-23 NOTE — NURSING
Ladder scan revealed 428 cc in bladder.  Intermittent catheter drained 400 cc yellow urine.  Replaced condom catheter as urine continues to drain.

## 2023-09-24 PROBLEM — R33.9 URINARY RETENTION: Status: ACTIVE | Noted: 2023-09-24

## 2023-09-24 LAB
ANION GAP SERPL CALC-SCNC: 11 MMOL/L (ref 8–16)
BASOPHILS # BLD AUTO: 0.08 K/UL (ref 0–0.2)
BASOPHILS NFR BLD: 0.5 % (ref 0–1.9)
BUN SERPL-MCNC: 62 MG/DL (ref 8–23)
CALCIUM SERPL-MCNC: 8.9 MG/DL (ref 8.7–10.5)
CHLORIDE SERPL-SCNC: 101 MMOL/L (ref 95–110)
CO2 SERPL-SCNC: 25 MMOL/L (ref 23–29)
CREAT SERPL-MCNC: 3 MG/DL (ref 0.5–1.4)
DIFFERENTIAL METHOD: ABNORMAL
EOSINOPHIL # BLD AUTO: 0.4 K/UL (ref 0–0.5)
EOSINOPHIL NFR BLD: 2.4 % (ref 0–8)
ERYTHROCYTE [DISTWIDTH] IN BLOOD BY AUTOMATED COUNT: 15.7 % (ref 11.5–14.5)
EST. GFR  (NO RACE VARIABLE): 21 ML/MIN/1.73 M^2
GLUCOSE SERPL-MCNC: 127 MG/DL (ref 70–110)
HCT VFR BLD AUTO: 23 % (ref 40–54)
HGB BLD-MCNC: 7 G/DL (ref 14–18)
IMM GRANULOCYTES # BLD AUTO: 0.08 K/UL (ref 0–0.04)
IMM GRANULOCYTES NFR BLD AUTO: 0.5 % (ref 0–0.5)
IRON SERPL-MCNC: 13 UG/DL (ref 45–160)
LYMPHOCYTES # BLD AUTO: 1.2 K/UL (ref 1–4.8)
LYMPHOCYTES NFR BLD: 7.6 % (ref 18–48)
MAGNESIUM SERPL-MCNC: 1.9 MG/DL (ref 1.6–2.6)
MCH RBC QN AUTO: 24.8 PG (ref 27–31)
MCHC RBC AUTO-ENTMCNC: 30.4 G/DL (ref 32–36)
MCV RBC AUTO: 82 FL (ref 82–98)
MONOCYTES # BLD AUTO: 1.2 K/UL (ref 0.3–1)
MONOCYTES NFR BLD: 7.8 % (ref 4–15)
NEUTROPHILS # BLD AUTO: 12.5 K/UL (ref 1.8–7.7)
NEUTROPHILS NFR BLD: 81.2 % (ref 38–73)
NRBC BLD-RTO: 0 /100 WBC
PLATELET # BLD AUTO: 297 K/UL (ref 150–450)
PMV BLD AUTO: 8.7 FL (ref 9.2–12.9)
POCT GLUCOSE: 151 MG/DL (ref 70–110)
POTASSIUM SERPL-SCNC: 2.9 MMOL/L (ref 3.5–5.1)
POTASSIUM SERPL-SCNC: 3 MMOL/L (ref 3.5–5.1)
PROCALCITONIN SERPL IA-MCNC: 0.74 NG/ML
RBC # BLD AUTO: 2.82 M/UL (ref 4.6–6.2)
SATURATED IRON: 7 % (ref 20–50)
SODIUM SERPL-SCNC: 137 MMOL/L (ref 136–145)
TOTAL IRON BINDING CAPACITY: 195 UG/DL (ref 250–450)
TRANSFERRIN SERPL-MCNC: 132 MG/DL (ref 200–375)
WBC # BLD AUTO: 15.35 K/UL (ref 3.9–12.7)

## 2023-09-24 PROCEDURE — 96361 HYDRATE IV INFUSION ADD-ON: CPT

## 2023-09-24 PROCEDURE — 36415 COLL VENOUS BLD VENIPUNCTURE: CPT | Performed by: STUDENT IN AN ORGANIZED HEALTH CARE EDUCATION/TRAINING PROGRAM

## 2023-09-24 PROCEDURE — 25000003 PHARM REV CODE 250: Performed by: EMERGENCY MEDICINE

## 2023-09-24 PROCEDURE — 25000003 PHARM REV CODE 250: Performed by: NURSE PRACTITIONER

## 2023-09-24 PROCEDURE — 84145 PROCALCITONIN (PCT): CPT | Performed by: STUDENT IN AN ORGANIZED HEALTH CARE EDUCATION/TRAINING PROGRAM

## 2023-09-24 PROCEDURE — 25000003 PHARM REV CODE 250: Performed by: STUDENT IN AN ORGANIZED HEALTH CARE EDUCATION/TRAINING PROGRAM

## 2023-09-24 PROCEDURE — 83735 ASSAY OF MAGNESIUM: CPT | Performed by: STUDENT IN AN ORGANIZED HEALTH CARE EDUCATION/TRAINING PROGRAM

## 2023-09-24 PROCEDURE — 80048 BASIC METABOLIC PNL TOTAL CA: CPT | Performed by: STUDENT IN AN ORGANIZED HEALTH CARE EDUCATION/TRAINING PROGRAM

## 2023-09-24 PROCEDURE — 96366 THER/PROPH/DIAG IV INF ADDON: CPT

## 2023-09-24 PROCEDURE — 84466 ASSAY OF TRANSFERRIN: CPT | Performed by: STUDENT IN AN ORGANIZED HEALTH CARE EDUCATION/TRAINING PROGRAM

## 2023-09-24 PROCEDURE — A4216 STERILE WATER/SALINE, 10 ML: HCPCS | Performed by: EMERGENCY MEDICINE

## 2023-09-24 PROCEDURE — 96376 TX/PRO/DX INJ SAME DRUG ADON: CPT

## 2023-09-24 PROCEDURE — 96375 TX/PRO/DX INJ NEW DRUG ADDON: CPT

## 2023-09-24 PROCEDURE — 85025 COMPLETE CBC W/AUTO DIFF WBC: CPT | Performed by: STUDENT IN AN ORGANIZED HEALTH CARE EDUCATION/TRAINING PROGRAM

## 2023-09-24 PROCEDURE — 63600175 PHARM REV CODE 636 W HCPCS: Performed by: STUDENT IN AN ORGANIZED HEALTH CARE EDUCATION/TRAINING PROGRAM

## 2023-09-24 PROCEDURE — 84132 ASSAY OF SERUM POTASSIUM: CPT | Performed by: STUDENT IN AN ORGANIZED HEALTH CARE EDUCATION/TRAINING PROGRAM

## 2023-09-24 PROCEDURE — 51798 US URINE CAPACITY MEASURE: CPT

## 2023-09-24 PROCEDURE — G0378 HOSPITAL OBSERVATION PER HR: HCPCS

## 2023-09-24 RX ORDER — POTASSIUM CHLORIDE 20 MEQ/1
40 TABLET, EXTENDED RELEASE ORAL EVERY 4 HOURS
Status: COMPLETED | OUTPATIENT
Start: 2023-09-24 | End: 2023-09-24

## 2023-09-24 RX ORDER — BUMETANIDE 0.25 MG/ML
2 INJECTION INTRAMUSCULAR; INTRAVENOUS 2 TIMES DAILY
Status: DISCONTINUED | OUTPATIENT
Start: 2023-09-24 | End: 2023-09-27

## 2023-09-24 RX ORDER — POTASSIUM CHLORIDE 20 MEQ/1
40 TABLET, EXTENDED RELEASE ORAL 2 TIMES DAILY
Status: DISCONTINUED | OUTPATIENT
Start: 2023-09-24 | End: 2023-09-24

## 2023-09-24 RX ADMIN — OXYCODONE HYDROCHLORIDE AND ACETAMINOPHEN 500 MG: 500 TABLET ORAL at 10:09

## 2023-09-24 RX ADMIN — ALLOPURINOL 100 MG: 100 TABLET ORAL at 09:09

## 2023-09-24 RX ADMIN — AMIODARONE HYDROCHLORIDE 200 MG: 200 TABLET ORAL at 09:09

## 2023-09-24 RX ADMIN — POTASSIUM CHLORIDE 40 MEQ: 1500 TABLET, EXTENDED RELEASE ORAL at 03:09

## 2023-09-24 RX ADMIN — OXYCODONE HYDROCHLORIDE AND ACETAMINOPHEN 500 MG: 500 TABLET ORAL at 09:09

## 2023-09-24 RX ADMIN — BUMETANIDE 2 MG: 0.25 INJECTION INTRAMUSCULAR; INTRAVENOUS at 10:09

## 2023-09-24 RX ADMIN — DOXYCYCLINE HYCLATE 100 MG: 100 TABLET, COATED ORAL at 10:09

## 2023-09-24 RX ADMIN — Medication 10 ML: at 12:09

## 2023-09-24 RX ADMIN — TAMSULOSIN HYDROCHLORIDE 0.4 MG: 0.4 CAPSULE ORAL at 09:09

## 2023-09-24 RX ADMIN — BUMETANIDE 2 MG: 0.25 INJECTION INTRAMUSCULAR; INTRAVENOUS at 09:09

## 2023-09-24 RX ADMIN — Medication 10 ML: at 06:09

## 2023-09-24 RX ADMIN — DOXYCYCLINE HYCLATE 100 MG: 100 TABLET, COATED ORAL at 09:09

## 2023-09-24 RX ADMIN — ATORVASTATIN CALCIUM 20 MG: 10 TABLET, FILM COATED ORAL at 09:09

## 2023-09-24 RX ADMIN — POTASSIUM CHLORIDE 40 MEQ: 1500 TABLET, EXTENDED RELEASE ORAL at 10:09

## 2023-09-24 RX ADMIN — APIXABAN 5 MG: 5 TABLET, FILM COATED ORAL at 10:09

## 2023-09-24 RX ADMIN — APIXABAN 5 MG: 5 TABLET, FILM COATED ORAL at 09:09

## 2023-09-24 RX ADMIN — METOPROLOL SUCCINATE 25 MG: 25 TABLET, EXTENDED RELEASE ORAL at 05:09

## 2023-09-24 RX ADMIN — Medication 10 ML: at 05:09

## 2023-09-24 RX ADMIN — SODIUM CHLORIDE 250 ML: 9 INJECTION, SOLUTION INTRAVENOUS at 12:09

## 2023-09-24 NOTE — PLAN OF CARE
Case Management Assessment     PCP: Devonte [facility] provider  Pharmacy: [facility] provides medications  Patient Arrived From: [facility]  Existing Help at Home: Staff    Barriers to Discharge: Returning to NH    Discharge Plan:    A. Return to Nursing Home-[facility]   B. Return to Nursing Home-[facility]    Assist needed; from [facility]; uses RW; discharge and return to [facility]     09/24/23 0760   Discharge Assessment   Assessment Type Discharge Planning Assessment   Confirmed/corrected address, phone number and insurance Yes   Confirmed Demographics Correct on Facesheet  (Home address in [place]; currently at [facility])   Source of Information patient;health record   Does patient/caregiver understand observation status Yes   Communicated ELY with patient/caregiver Date not available/Unable to determine   Reason For Admission Leukoccytosis   People in Home facility resident   Facility Arrived From: [facility]   Do you expect to return to your current living situation? Yes   Do you have help at home or someone to help you manage your care at home? Yes   Who are your caregiver(s) and their phone number(s)? NH Staff; niece when at home   Prior to hospitilization cognitive status: Alert/Oriented   Current cognitive status: Alert/Oriented   Walking or Climbing Stairs ambulation difficulty, requires equipment   Mobility Management RW   Dressing/Bathing bathing difficulty, assistance 1 person   Dressing/Bathing Management NH staff will assist   Do you have any problems with: Errands/Grocery;Needs other help   Specify other help Assist needed; NH staff assist and transport   Home Accessibility wheelchair accessible   Home Layout Able to live on 1st floor   Equipment Currently Used at Home walker, rolling   Readmission within 30 days? Yes  ([facility] sent patient back for check and clearance)   Patient currently being followed by outpatient case  management? No   Do you currently have service(s) that help you manage your care at home?   ([facility])   Do you take prescription medications? Yes   Do you have prescription coverage? Yes   Coverage [employer]; Medicare   Do you have any problems affording any of your prescribed medications? No   Is the patient taking medications as prescribed? yes   Who is going to help you get home at discharge? [facility] staff   How do you get to doctors appointments? agency   Are you on dialysis? No   Do you take coumadin? No   DME Needed Upon Discharge  other (see comments)  (TBD)   Discharge Plan discussed with: Patient   Transition of Care Barriers None   Discharge Plan A Return to nursing home  ([facility])   Discharge Plan B Return to Nursing Home  ([facility])     SW Role explained to patient; two patient identifiers recognized; SW contact information placed on Communication board. Discussed patient managing health care at home and discussed discharge plans A and B; determined who would be helping patient at home with recovery: [facility] staff will help with recovery at home.

## 2023-09-24 NOTE — HOSPITAL COURSE
72M with pmh of hfref, CAD, DM, HTN, HLD, a fib who presents back due to fever and leukocytosis. Source of sepsis is Klebsiella UTI. Continued antibiotics. Fever resolved. Changed to PO cipro to complete 7 days treatment. Also noted urinary retention and failed voiding trial on 9/23 and again on 9/26. Urinary retention contributes to UTI risk.  Continued tamsulosin, increased bowel regimen. Plan continue jackson x1 week and follow up with Urology on discharge. Stable for discharge back to nursing home.

## 2023-09-24 NOTE — NURSING
PER handoff received from JOYCE Burgess     Pt resting in bed quietly. NAD noted. No c/o pain.  Fall and safety precautions maintained. Bed alarm activated and audible.. Bed locked in lowest position, with side rails up x2. Call bell and personal items within reach

## 2023-09-24 NOTE — NURSING
@3890 BS: 432mL of urine, asymptomatic pt has tinge bright red at the head of penis. Raimundo ELI NP notified new order for straight cath.  @0038 there were 3 unsuccessful attempts on doing the straight cath, Raimundo ELI NP notified new order for NS bolus of 250mL and hold off on straight cath if pt is asymptomatic.

## 2023-09-24 NOTE — ASSESSMENT & PLAN NOTE
Patient's FSGs are controlled on current medication regimen.  Last A1c reviewed-   Lab Results   Component Value Date    HGBA1C 5.5 09/20/2023     Most recent fingerstick glucose reviewed-   Recent Labs   Lab 09/23/23  1339 09/23/23  1611 09/23/23  1956   POCTGLUCOSE 164* 118* 171*     Current correctional scale  Low   anti-hyperglycemic dose as follows-   Antihyperglycemics (From admission, onward)    Start     Stop Route Frequency Ordered    09/23/23 1251  insulin aspart U-100 pen 0-5 Units         -- SubQ Before meals & nightly PRN 09/23/23 1152        Hold Oral hypoglycemics while patient is in the hospital.

## 2023-09-24 NOTE — ASSESSMENT & PLAN NOTE
Pt with persistent rentention overnight of 9/23 with failed attempts at straight cath. De Leon catheter placed.    -urology consulted

## 2023-09-24 NOTE — PROGRESS NOTES
[facility] Medicine  Progress Note    Patient Name: Chato Bowie  MRN: [id number]  Patient Class: OP- Observation   Admission Date: 9/23/2023  Length of Stay: 0 days  Attending Physician: Jose L Evans III, MD  Primary Care Provider: Irlanda Valenzuela -        Subjective:     Principal Problem:Sepsis        HPI:  72M with pmh of hfref, CAD, DM, HTN, HLD, a fib who presents back due to fever and leukocytosis. Pt discharge order was placed the previous day 9/22, but did not leave until 5:15 on 9/23 prior to shift change to go back to his nursing facility. On review of the pts overnight vitals and labs pt noted to have a fever and new leukocytosis. I canceled pts discharge and alerted nurse of change of plan, but was informed pt had already left the facility. I called pts nursing home ([facility]) and spoke to a nurse. I informed them of his white count and fever overnight and advised that pt should be brought back to the emergency room for re-evaluation if there was not a provider in house. Nursing staff endorsed understanding and informed me they would start the process to have pt re-evaluated. Currently pt states he is feeling ok, but does endorse burning with urination. Denies hematuria, abd pain, n/v/d, cp, ha, blurry vision, sob, cough at this time.      Overview/Hospital Course:  No notes on file    Interval History: Pt states he is doing ok. Had persistent rentention overnight requiring jackson placement. No fever or chills.    Review of Systems  Objective:     Vital Signs (Most Recent):  Temp: 98.3 °F (36.8 °C) (09/24/23 0746)  Pulse: 61 (09/24/23 0746)  Resp: 18 (09/24/23 0746)  BP: 123/61 (09/24/23 0931)  SpO2: 96 % (09/24/23 0746) Vital Signs (24h Range):  Temp:  [97.7 °F (36.5 °C)-99.1 °F (37.3 °C)] 98.3 °F (36.8 °C)  Pulse:  [61-68] 61  Resp:  [14-18] 18  SpO2:  [92 %-97 %] 96 %  BP: (119-144)/(56-67) 123/61     Weight: 95.3 kg (210 lb)  Body mass index is 29.29  kg/m².    Intake/Output Summary (Last 24 hours) at 9/24/2023 1208  Last data filed at 9/24/2023 1013  Gross per 24 hour   Intake 120 ml   Output 1675 ml   Net -1555 ml         Physical Exam  Vitals reviewed.   HENT:      Head: Normocephalic and atraumatic.      Mouth/Throat:      Mouth: Mucous membranes are dry.      Pharynx: Oropharynx is clear.   Eyes:      General: No scleral icterus.     Extraocular Movements: Extraocular movements intact.   Abdominal:      General: There is no distension.      Palpations: Abdomen is soft.      Tenderness: There is no abdominal tenderness. There is no guarding or rebound.   Musculoskeletal:         General: Swelling present.      Cervical back: Neck supple. No rigidity.   Skin:     General: Skin is warm and dry.   Neurological:      General: No focal deficit present.      Mental Status: He is alert and oriented to person, place, and time.   Psychiatric:         Mood and Affect: Mood normal.         Behavior: Behavior normal.             Significant Labs: All pertinent labs within the past 24 hours have been reviewed.    Significant Imaging: I have reviewed all pertinent imaging results/findings within the past 24 hours.      Assessment/Plan:      * Sepsis  This patient does have evidence of infective focus  My overall impression is sepsis.  Source: Respiratory and Urinary Tract  Antibiotics given-   Antibiotics (72h ago, onward)    Start     Stop Route Frequency Ordered    09/24/23 0000  cefTRIAXone (ROCEPHIN) 2 g in dextrose 5 % in water (D5W) 100 mL IVPB (MB+)         -- IV Every 24 hours (non-standard times) 09/23/23 1153    09/23/23 1300  doxycycline tablet 100 mg         -- Oral Every 12 hours 09/23/23 1153        Latest lactate reviewed-  Recent Labs   Lab 09/23/23  0930   LACTATE 0.9     Organ dysfunction indicated by Acute kidney injury    Fluid challenge Contraindicated- Fluid bolus is contraindicated in this patient due to Congestive Heart Failure     Post-  resuscitation assessment No - Post resuscitation assessment not needed       Will Not start Pressors- Levophed for MAP of 65  Source control achieved by: Pt presenting with fever and leukocytosis with concerns for possible respiratory vs urinary infection.    -ceftriaxone/doxy for abx coverage  -urine and sputum cultures pending  -bc ordered    Urinary retention  Pt with persistent rentention overnight of 9/23 with failed attempts at straight cath. De Leon catheter placed.    -urology consulted      ACP (advance care planning)    Advance Care Planning     Date: 09/23/2023  Pt would like to be full code 2 minutes discussing acp. Palliative care followed pt.        UTI (urinary tract infection)  tx as stated above    CKD (chronic kidney disease), stage IV  Pt with stable renal function.     -continue to monitor and renally dose medications  -outpatient nephrology f/u    Paroxysmal atrial flutter  Pt with h/o a fib on eliquis and metoprolol at home. Currently rate controlled    -continue eliquis and metoprolol    BPH (benign prostatic hyperplasia)  Continue home medications      Hypokalemia  Continue to replete prn as pt on iv diuresis      Anemia  Stable    -continue to monitor    Type 2 diabetes mellitus with kidney complication, with long-term current use of insulin  Patient's FSGs are controlled on current medication regimen.  Last A1c reviewed-   Lab Results   Component Value Date    HGBA1C 5.5 09/20/2023     Most recent fingerstick glucose reviewed-   Recent Labs   Lab 09/23/23  1339 09/23/23  1611 09/23/23  1956   POCTGLUCOSE 164* 118* 171*     Current correctional scale  Low   anti-hyperglycemic dose as follows-   Antihyperglycemics (From admission, onward)    Start     Stop Route Frequency Ordered    09/23/23 1251  insulin aspart U-100 pen 0-5 Units         -- SubQ Before meals & nightly PRN 09/23/23 1152        Hold Oral hypoglycemics while patient is in the hospital.      VTE Risk Mitigation (From admission,  onward)         Ordered     apixaban tablet 5 mg  2 times daily         09/23/23 1152     Reason for No Pharmacological VTE Prophylaxis  Once        Question:  Reasons:  Answer:  Already adequately anticoagulated on oral Anticoagulants    09/23/23 1152     IP VTE HIGH RISK PATIENT  Once         09/23/23 1152     Place sequential compression device  Until discontinued         09/23/23 1152                Discharge Planning   ELY:      Code Status: Full Code   Is the patient medically ready for discharge?:     Reason for patient still in hospital (select all that apply): Laboratory test, Treatment and Consult recommendations  Discharge Plan A: Return to nursing home ([facility])                  Jose L Evans III, MD  Department of [facility] Medicine   [facility]

## 2023-09-24 NOTE — ASSESSMENT & PLAN NOTE
- De Leon placed 9/23, immediate return of urine is unclear  - recommend continuing De Leon x 2 days total, then giving voiding trial  - continue Flomax  - patient has seen Dr. Schneider outpatient previously and prefers to follow up with her; will arrange f/u   Complex Repair And Flap Additional Text (Will Appearing After The Standard Complex Repair Text): The complex repair was not sufficient to completely close the primary defect. The remaining additional defect was repaired with the flap mentioned below.

## 2023-09-24 NOTE — ASSESSMENT & PLAN NOTE
Pt with h/o a fib on eliquis and metoprolol at home. Currently rate controlled    -continue eliquis and metoprolol

## 2023-09-24 NOTE — ASSESSMENT & PLAN NOTE
This patient does have evidence of infective focus  My overall impression is sepsis.  Source: Respiratory and Urinary Tract  Antibiotics given-   Antibiotics (72h ago, onward)    Start     Stop Route Frequency Ordered    09/24/23 0000  cefTRIAXone (ROCEPHIN) 2 g in dextrose 5 % in water (D5W) 100 mL IVPB (MB+)         -- IV Every 24 hours (non-standard times) 09/23/23 1153    09/23/23 1300  doxycycline tablet 100 mg         -- Oral Every 12 hours 09/23/23 1153        Latest lactate reviewed-  Recent Labs   Lab 09/23/23  0930   LACTATE 0.9     Organ dysfunction indicated by Acute kidney injury    Fluid challenge Contraindicated- Fluid bolus is contraindicated in this patient due to Congestive Heart Failure     Post- resuscitation assessment No - Post resuscitation assessment not needed       Will Not start Pressors- Levophed for MAP of 65  Source control achieved by: Pt presenting with fever and leukocytosis with concerns for possible respiratory vs urinary infection.    -ceftriaxone/doxy for abx coverage  -urine and sputum cultures pending  -bc ordered

## 2023-09-24 NOTE — HPI
Mr. Bowie is a 71 yo M with PMH of of HFrEF, CAD, DM2 (9/2023 A1c 5.5%), HTN, HLD, BPH, AF on Eliquis who was admitted 9/19 for fever and leukocytosis. NGTD on Bcx, Ucx from 9/23 growing GNR, on Rocephin. Urology was consulted for urinary retention after postvoid bladder scan showed 515 cc. A De Leon catheter was then placed; immediate output is not recorded, has put out 450 cc total since placement last night. He had been voiding spontaneously prior to De Leon placement. He was last seen by Dr. Schneider in 2020. Cr is now 3.0 from 3.5 at admission; unclear baseline, ranged from 2.0 to 3.0 in 08/2023. CT AP w/o obtained 9/19 shows no hydroureteronephrosis nor urolithiasis, though bladder appears slightly distended. Prior to admission he reports having had dysuria, straining to void, sensation of incomplete emptying. He had an Rx for Flomax but reports not taking this. He reports being constipated, straining to have bowel movements, and not ambulating much.

## 2023-09-24 NOTE — NURSING
[facility]  Nurses Note -- 4 Eyes      9/24/2023       Skin assessed on: Q Shift      [] No Pressure Injuries Present    []Prevention Measures Documented    [x] Yes LDA  for Pressure Injury Previously documented     [] Yes New Pressure Injury Discovered   [] LDA for New Pressure Injury Added      Attending RN:  Ghazal Grant RN     Second RN:  JOYCE Burgess

## 2023-09-24 NOTE — PLAN OF CARE
Problem: Adult Inpatient Plan of Care  Goal: Plan of Care Review  Outcome: Ongoing, Progressing  Goal: Patient-Specific Goal (Individualized)  Outcome: Ongoing, Progressing  Goal: Absence of Hospital-Acquired Illness or Injury  Outcome: Ongoing, Progressing  Intervention: Identify and Manage Fall Risk  Flowsheets (Taken 9/24/2023 0403)  Safety Promotion/Fall Prevention:   assistive device/personal item within reach   side rails raised x 2  Intervention: Prevent Skin Injury  Flowsheets (Taken 9/24/2023 0403)  Body Position: turned  Skin Protection: adhesive use limited  Intervention: Prevent and Manage VTE (Venous Thromboembolism) Risk  Flowsheets (Taken 9/24/2023 0403)  Activity Management: Rolling - L1  VTE Prevention/Management: bleeding risk assessed  Range of Motion: ROM (range of motion) performed  Intervention: Prevent Infection  Flowsheets (Taken 9/24/2023 0403)  Infection Prevention:   single patient room provided   rest/sleep promoted  Goal: Optimal Comfort and Wellbeing  Outcome: Ongoing, Progressing  Intervention: Monitor Pain and Promote Comfort  Flowsheets (Taken 9/24/2023 0403)  Pain Management Interventions:   relaxation techniques promoted   quiet environment facilitated  Intervention: Provide Person-Centered Care  Flowsheets (Taken 9/24/2023 0403)  Trust Relationship/Rapport:   care explained   choices provided   emotional support provided   empathic listening provided  Goal: Readiness for Transition of Care  Outcome: Ongoing, Progressing     Problem: Diabetes Comorbidity  Goal: Blood Glucose Level Within Targeted Range  Outcome: Ongoing, Progressing  Intervention: Monitor and Manage Glycemia  Flowsheets (Taken 9/24/2023 0403)  Glycemic Management: blood glucose monitored     Problem: Adjustment to Illness (Sepsis/Septic Shock)  Goal: Optimal Coping  Outcome: Ongoing, Progressing  Intervention: Optimize Psychosocial Adjustment to Illness  Flowsheets (Taken 9/24/2023 0403)  Supportive Measures:  active listening utilized     Problem: Bleeding (Sepsis/Septic Shock)  Goal: Absence of Bleeding  Outcome: Ongoing, Progressing  Intervention: Monitor and Manage Bleeding  Flowsheets (Taken 9/24/2023 0403)  Bleeding Precautions: blood pressure closely monitored     Problem: Infection Progression (Sepsis/Septic Shock)  Goal: Absence of Infection Signs and Symptoms  Outcome: Ongoing, Progressing  Intervention: Initiate Sepsis Management  Flowsheets (Taken 9/24/2023 0403)  Infection Prevention:   single patient room provided   rest/sleep promoted  Infection Management: aseptic technique maintained  Intervention: Promote Recovery  Flowsheets (Taken 9/24/2023 0403)  Activity Management: Rolling - L1     Problem: Nutrition Impaired (Sepsis/Septic Shock)  Goal: Optimal Nutrition Intake  Outcome: Ongoing, Progressing     Problem: Fluid and Electrolyte Imbalance (Acute Kidney Injury/Impairment)  Goal: Fluid and Electrolyte Balance  Outcome: Ongoing, Progressing     Problem: Oral Intake Inadequate (Acute Kidney Injury/Impairment)  Goal: Optimal Nutrition Intake  Outcome: Ongoing, Progressing     Problem: Renal Function Impairment (Acute Kidney Injury/Impairment)  Goal: Effective Renal Function  Outcome: Ongoing, Progressing  Intervention: Monitor and Support Renal Function  Flowsheets (Taken 9/24/2023 0403)  Medication Review/Management: medications reviewed     Problem: Infection  Goal: Absence of Infection Signs and Symptoms  Outcome: Ongoing, Progressing  Intervention: Prevent or Manage Infection  Flowsheets (Taken 9/24/2023 0403)  Infection Management: aseptic technique maintained     Problem: Impaired Wound Healing  Goal: Optimal Wound Healing  Outcome: Ongoing, Progressing  Intervention: Promote Wound Healing  Flowsheets (Taken 9/24/2023 0403)  Activity Management: Rolling - L1  Pain Management Interventions:   relaxation techniques promoted   quiet environment facilitated     Problem: Skin Injury Risk Increased  Goal:  Skin Health and Integrity  Outcome: Ongoing, Progressing  Intervention: Optimize Skin Protection  Flowsheets (Taken 9/24/2023 0403)  Pressure Reduction Techniques: frequent weight shift encouraged  Skin Protection: adhesive use limited  Head of Bed (HOB) Positioning: HOB elevated

## 2023-09-24 NOTE — SUBJECTIVE & OBJECTIVE
Past Medical History:   Diagnosis Date    Acute congestive heart failure 3/20/2020    Alcohol abuse     Arthritis     Asthma attack 11/24/2021    Coronary artery disease     Diabetes mellitus     Hyperlipemia     Hypertension     Multiple thyroid nodules 6/14/2023    Rhabdomyolysis        Past Surgical History:   Procedure Laterality Date    ECHOCARDIOGRAM,TRANSESOPHAGEAL N/A 9/21/2023    Procedure: Transesophageal echo (MARIA) intra-procedure log documentation;  Surgeon: Luisana Rodríguez MD;  Location: [facility] CATH LAB;  Service: Cardiology;  Laterality: N/A;    EYE SURGERY      TRANSESOPHAGEAL ECHOCARDIOGRAM WITH POSSIBLE CARDIOVERSION (MARIA W/ POSS CARDIOVERSION) N/A 1/5/2023    Procedure: Transesophageal echo (MARIA) intra-procedure log documentation;  Surgeon: Indra Foote MD;  Location: [facility] CATH LAB;  Service: Cardiology;  Laterality: N/A;    TRANSESOPHAGEAL ECHOCARDIOGRAPHY N/A 9/21/2023    Procedure: ECHOCARDIOGRAM, TRANSESOPHAGEAL;  Surgeon: Luisana Rodríguez MD;  Location: [facility] CATH LAB;  Service: Cardiology;  Laterality: N/A;    TREATMENT OF CARDIAC ARRHYTHMIA N/A 12/7/2020    Procedure: Cardioversion or Defibrillation;  Surgeon: Indra Foote MD;  Location: [facility] CATH LAB;  Service: Cardiology;  Laterality: N/A;    TREATMENT OF CARDIAC ARRHYTHMIA N/A 12/30/2020    Procedure: Cardioversion or Defibrillation;  Surgeon: Tyrone Steen MD;  Location: [facility] CATH LAB;  Service: Cardiology;  Laterality: N/A;    TREATMENT OF CARDIAC ARRHYTHMIA N/A 1/5/2023    Procedure: Cardioversion or Defibrillation;  Surgeon: Indra Foote MD;  Location: [facility] CATH LAB;  Service: Cardiology;  Laterality: N/A;    VASCULAR SURGERY         Review of patient's allergies indicates:  No Known Allergies    Family History       Problem Relation (Age of Onset)    Diabetes Mother, Father, Sister    Hypertension Mother, Father, Sister            Tobacco Use    Smoking status: Never    Smokeless tobacco: Never   Substance and  Sexual Activity    Alcohol use: Not Currently     Alcohol/week: 6.0 standard drinks of alcohol     Types: 6 Cans of beer per week     Comment: daily    Drug use: No    Sexual activity: Yes     Partners: Female       Review of Systems   All other systems reviewed and are negative.      Objective:     Temp:  [97.7 °F (36.5 °C)-99.1 °F (37.3 °C)] 98.3 °F (36.8 °C)  Pulse:  [61-68] 62  Resp:  [16-18] 18  SpO2:  [94 %-97 %] 96 %  BP: (119-134)/(56-66) 122/60  Weight: 95.3 kg (210 lb)  Body mass index is 29.29 kg/m².    Date 09/24/23 0700 - 09/25/23 0659   Shift 7176-8173 1881-7233 2355-6407 24 Hour Total   INTAKE   P.O. 120   120   Shift Total(mL/kg) 120(1.3)   120(1.3)   OUTPUT   Urine(mL/kg/hr) 600   600   Shift Total(mL/kg) 600(6.3)   600(6.3)   Weight (kg) 95.3 95.3 95.3 95.3     Bladder Scan Volume (mL): 515 mL (09/24/23 0616)    Drains       Drain  Duration                  Urethral Catheter 09/24/23 0721 Straight-tip 16 Fr. <1 day                     Physical Exam  Constitutional:       Appearance: Normal appearance.   Eyes:      Conjunctiva/sclera: Conjunctivae normal.   Cardiovascular:      Rate and Rhythm: Normal rate.   Pulmonary:      Effort: Pulmonary effort is normal. No respiratory distress.   Abdominal:      General: Abdomen is flat. There is no distension.      Tenderness: There is no abdominal tenderness.   Genitourinary:     Comments: De Leon draining yellow urine with white sediment  Skin:     General: Skin is warm and dry.   Neurological:      General: No focal deficit present.      Mental Status: He is alert and oriented to person, place, and time.   Psychiatric:         Mood and Affect: Mood normal.         Behavior: Behavior normal.         Thought Content: Thought content normal.         Judgment: Judgment normal.          Significant Labs:    BMP:  Recent Labs   Lab 09/23/23  0445 09/23/23  0930 09/24/23  0433    136 137   K 3.3* 3.5 2.9*    100 101   CO2 25 25 25   BUN 66* 64* 62*    CREATININE 3.3* 3.3* 3.0*   CALCIUM 8.7 9.1 8.9       CBC:  Recent Labs   Lab 09/23/23  0445 09/23/23  0930 09/24/23  0433   WBC 13.82* 17.49* 15.35*   HGB 7.4* 7.9* 7.0*   HCT 24.3* 25.8* 23.0*    317 297       Blood Culture:   Recent Labs   Lab 09/21/23  1029 09/23/23  0929 09/23/23  0930   LABBLOO No Growth to date  No Growth to date  No Growth to date  No Growth to date  No Growth to date  No Growth to date  No Growth to date  No Growth to date No Growth to date  No Growth to date No Growth to date  No Growth to date     Urine Culture:   Recent Labs   Lab 09/23/23  0934   LABURIN GRAM NEGATIVE MARITZA  >100,000 cfu/ml  Identification and susceptibility pending  *     Urine Studies:   Recent Labs   Lab 09/23/23  0934   COLORU Yellow   APPEARANCEUA Hazy*   PHUR 7.0   SPECGRAV 1.010   PROTEINUA 1+*   GLUCUA Negative   KETONESU Negative   BILIRUBINUA Negative   OCCULTUA 1+*   NITRITE Negative   UROBILINOGEN Negative   LEUKOCYTESUR 3+*   RBCUA 2   WBCUA 46*   BACTERIA Moderate*   SQUAMEPITHEL 0   HYALINECASTS 0     All pertinent labs results from the past 24 hours have been reviewed.    Significant Imaging:  All pertinent imaging results/findings from the past 24 hours have been reviewed.  See HPI

## 2023-09-24 NOTE — NURSING
[facility] [place]  Nurses Note -- 4 Eyes      9/23/2023      Skin assessed on: Q Shift      [] No Pressure Injuries Present    []Prevention Measures Documented    [x] Yes LDA  for Pressure Injury Previously documented     [] Yes New Pressure Injury Discovered   [] LDA for New Pressure Injury Added      Attending RN:  Jenifer Whittaker RN     Second RN:  Zabrina QUIÑONES RN

## 2023-09-24 NOTE — SUBJECTIVE & OBJECTIVE
Interval History: Pt states he is doing ok. Had persistent rentention overnight requiring jackson placement. No fever or chills.    Review of Systems  Objective:     Vital Signs (Most Recent):  Temp: 98.3 °F (36.8 °C) (09/24/23 0746)  Pulse: 61 (09/24/23 0746)  Resp: 18 (09/24/23 0746)  BP: 123/61 (09/24/23 0931)  SpO2: 96 % (09/24/23 0746) Vital Signs (24h Range):  Temp:  [97.7 °F (36.5 °C)-99.1 °F (37.3 °C)] 98.3 °F (36.8 °C)  Pulse:  [61-68] 61  Resp:  [14-18] 18  SpO2:  [92 %-97 %] 96 %  BP: (119-144)/(56-67) 123/61     Weight: 95.3 kg (210 lb)  Body mass index is 29.29 kg/m².    Intake/Output Summary (Last 24 hours) at 9/24/2023 1208  Last data filed at 9/24/2023 1013  Gross per 24 hour   Intake 120 ml   Output 1675 ml   Net -1555 ml         Physical Exam  Vitals reviewed.   HENT:      Head: Normocephalic and atraumatic.      Mouth/Throat:      Mouth: Mucous membranes are dry.      Pharynx: Oropharynx is clear.   Eyes:      General: No scleral icterus.     Extraocular Movements: Extraocular movements intact.   Abdominal:      General: There is no distension.      Palpations: Abdomen is soft.      Tenderness: There is no abdominal tenderness. There is no guarding or rebound.   Musculoskeletal:         General: Swelling present.      Cervical back: Neck supple. No rigidity.   Skin:     General: Skin is warm and dry.   Neurological:      General: No focal deficit present.      Mental Status: He is alert and oriented to person, place, and time.   Psychiatric:         Mood and Affect: Mood normal.         Behavior: Behavior normal.             Significant Labs: All pertinent labs within the past 24 hours have been reviewed.    Significant Imaging: I have reviewed all pertinent imaging results/findings within the past 24 hours.

## 2023-09-24 NOTE — CONSULTS
[facility] - Telemetry  Urology  Consult Note    Patient Name: Chato Bowie  MRN: [id number]  Admission Date: 9/23/2023  Hospital Length of Stay: 0   Code Status: Full Code   Attending Provider: Jose L Evans III, MD   Consulting Provider: Lior Owens MD  Primary Care Physician: Irlanda Valenzuela -  Principal Problem:Sepsis    Inpatient consult to Urology  Consult performed by: Lior Owens MD  Consult ordered by: Jose L Evans III, MD          Subjective:     HPI:  Mr. Bowie is a 73 yo M with PMH of of HFrEF, CAD, DM2 (9/2023 A1c 5.5%), HTN, HLD, BPH, AF on Eliquis who was admitted 9/19 for fever and leukocytosis. NGTD on Bcx, Ucx from 9/23 growing GNR, on Rocephin. Urology was consulted for urinary retention after postvoid bladder scan showed 515 cc. A De Leon catheter was then placed; immediate output is not recorded, has put out 450 cc total since placement last night. He had been voiding spontaneously prior to De Leon placement. He was last seen by Dr. Schneider in 2020. Cr is now 3.0 from 3.5 at admission; unclear baseline, ranged from 2.0 to 3.0 in 08/2023. CT AP w/o obtained 9/19 shows no hydroureteronephrosis nor urolithiasis, though bladder appears slightly distended. Prior to admission he reports having had dysuria, straining to void, sensation of incomplete emptying. He had an Rx for Flomax but reports not taking this. He reports being constipated, straining to have bowel movements, and not ambulating much.       Past Medical History:   Diagnosis Date    Acute congestive heart failure 3/20/2020    Alcohol abuse     Arthritis     Asthma attack 11/24/2021    Coronary artery disease     Diabetes mellitus     Hyperlipemia     Hypertension     Multiple thyroid nodules 6/14/2023    Rhabdomyolysis        Past Surgical History:   Procedure Laterality Date    ECHOCARDIOGRAM,TRANSESOPHAGEAL N/A 9/21/2023    Procedure: Transesophageal echo (MARIA) intra-procedure log documentation;  Surgeon: Marcos  Luisana PORTILLO MD;  Location: [facility] CATH LAB;  Service: Cardiology;  Laterality: N/A;    EYE SURGERY      TRANSESOPHAGEAL ECHOCARDIOGRAM WITH POSSIBLE CARDIOVERSION (MARIA W/ POSS CARDIOVERSION) N/A 1/5/2023    Procedure: Transesophageal echo (MARIA) intra-procedure log documentation;  Surgeon: Indra Foote MD;  Location: [facility] CATH LAB;  Service: Cardiology;  Laterality: N/A;    TRANSESOPHAGEAL ECHOCARDIOGRAPHY N/A 9/21/2023    Procedure: ECHOCARDIOGRAM, TRANSESOPHAGEAL;  Surgeon: Luisana Rodríguez MD;  Location: [facility] CATH LAB;  Service: Cardiology;  Laterality: N/A;    TREATMENT OF CARDIAC ARRHYTHMIA N/A 12/7/2020    Procedure: Cardioversion or Defibrillation;  Surgeon: Indra Foote MD;  Location: [facility] CATH LAB;  Service: Cardiology;  Laterality: N/A;    TREATMENT OF CARDIAC ARRHYTHMIA N/A 12/30/2020    Procedure: Cardioversion or Defibrillation;  Surgeon: Tyrone Steen MD;  Location: [facility] CATH LAB;  Service: Cardiology;  Laterality: N/A;    TREATMENT OF CARDIAC ARRHYTHMIA N/A 1/5/2023    Procedure: Cardioversion or Defibrillation;  Surgeon: Indra Foote MD;  Location: [facility] CATH LAB;  Service: Cardiology;  Laterality: N/A;    VASCULAR SURGERY         Review of patient's allergies indicates:  No Known Allergies    Family History       Problem Relation (Age of Onset)    Diabetes Mother, Father, Sister    Hypertension Mother, Father, Sister            Tobacco Use    Smoking status: Never    Smokeless tobacco: Never   Substance and Sexual Activity    Alcohol use: Not Currently     Alcohol/week: 6.0 standard drinks of alcohol     Types: 6 Cans of beer per week     Comment: daily    Drug use: No    Sexual activity: Yes     Partners: Female       Review of Systems   All other systems reviewed and are negative.      Objective:     Temp:  [97.7 °F (36.5 °C)-99.1 °F (37.3 °C)] 98.3 °F (36.8 °C)  Pulse:  [61-68] 62  Resp:  [16-18] 18  SpO2:  [94 %-97 %] 96 %  BP: (119-134)/(56-66) 122/60  Weight:  95.3 kg (210 lb)  Body mass index is 29.29 kg/m².    Date 09/24/23 0700 - 09/25/23 0659   Shift 5330-9533 3170-4321 0846-4796 24 Hour Total   INTAKE   P.O. 120   120   Shift Total(mL/kg) 120(1.3)   120(1.3)   OUTPUT   Urine(mL/kg/hr) 600   600   Shift Total(mL/kg) 600(6.3)   600(6.3)   Weight (kg) 95.3 95.3 95.3 95.3     Bladder Scan Volume (mL): 515 mL (09/24/23 0616)    Drains       Drain  Duration                  Urethral Catheter 09/24/23 0721 Straight-tip 16 Fr. <1 day                     Physical Exam  Constitutional:       Appearance: Normal appearance.   Eyes:      Conjunctiva/sclera: Conjunctivae normal.   Cardiovascular:      Rate and Rhythm: Normal rate.   Pulmonary:      Effort: Pulmonary effort is normal. No respiratory distress.   Abdominal:      General: Abdomen is flat. There is no distension.      Tenderness: There is no abdominal tenderness.   Genitourinary:     Comments: De Leon draining yellow urine with white sediment  Skin:     General: Skin is warm and dry.   Neurological:      General: No focal deficit present.      Mental Status: He is alert and oriented to person, place, and time.   Psychiatric:         Mood and Affect: Mood normal.         Behavior: Behavior normal.         Thought Content: Thought content normal.         Judgment: Judgment normal.          Significant Labs:    BMP:  Recent Labs   Lab 09/23/23  0445 09/23/23  0930 09/24/23  0433    136 137   K 3.3* 3.5 2.9*    100 101   CO2 25 25 25   BUN 66* 64* 62*   CREATININE 3.3* 3.3* 3.0*   CALCIUM 8.7 9.1 8.9       CBC:  Recent Labs   Lab 09/23/23  0445 09/23/23  0930 09/24/23  0433   WBC 13.82* 17.49* 15.35*   HGB 7.4* 7.9* 7.0*   HCT 24.3* 25.8* 23.0*    317 297       Blood Culture:   Recent Labs   Lab 09/21/23  1029 09/23/23  0929 09/23/23  0930   LABBLOO No Growth to date  No Growth to date  No Growth to date  No Growth to date  No Growth to date  No Growth to date  No Growth to date  No Growth to date  No Growth to date  No Growth to date No Growth to date  No Growth to date     Urine Culture:   Recent Labs   Lab 09/23/23  0934   LABURIN GRAM NEGATIVE MARITZA  >100,000 cfu/ml  Identification and susceptibility pending  *     Urine Studies:   Recent Labs   Lab 09/23/23  0934   COLORU Yellow   APPEARANCEUA Hazy*   PHUR 7.0   SPECGRAV 1.010   PROTEINUA 1+*   GLUCUA Negative   KETONESU Negative   BILIRUBINUA Negative   OCCULTUA 1+*   NITRITE Negative   UROBILINOGEN Negative   LEUKOCYTESUR 3+*   RBCUA 2   WBCUA 46*   BACTERIA Moderate*   SQUAMEPITHEL 0   HYALINECASTS 0     All pertinent labs results from the past 24 hours have been reviewed.    Significant Imaging:  All pertinent imaging results/findings from the past 24 hours have been reviewed.  See HPI                      Assessment and Plan:     Urinary retention  - De Leon placed 9/23, immediate return of urine is unclear  - recommend continuing De Leon x 2 days total, then giving voiding trial  - continue Flomax  - patient has seen Dr. Schneider outpatient previously and prefers to follow up with her; will arrange f/u    BPH (benign prostatic hyperplasia)  - continue Flomax; patient should be discharged with this    Type 2 diabetes mellitus with kidney complication, with long-term current use of insulin  - recommend tight BG control        VTE Risk Mitigation (From admission, onward)         Ordered     apixaban tablet 5 mg  2 times daily         09/23/23 1152     Reason for No Pharmacological VTE Prophylaxis  Once        Question:  Reasons:  Answer:  Already adequately anticoagulated on oral Anticoagulants    09/23/23 1152     IP VTE HIGH RISK PATIENT  Once         09/23/23 1152     Place sequential compression device  Until discontinued         09/23/23 1152                Thank you for your consult. I will sign off. Please contact us if you have any additional questions.    Lior Owens MD  Urology  [facility] - Telemetry

## 2023-09-25 ENCOUNTER — PATIENT OUTREACH (OUTPATIENT)
Dept: ADMINISTRATIVE | Facility: CLINIC | Age: 73
End: 2023-09-25
Payer: MEDICARE

## 2023-09-25 ENCOUNTER — TELEPHONE (OUTPATIENT)
Dept: SURGERY | Facility: CLINIC | Age: 73
End: 2023-09-25
Payer: MEDICARE

## 2023-09-25 LAB
ANION GAP SERPL CALC-SCNC: 10 MMOL/L (ref 8–16)
BACTERIA BLD CULT: NORMAL
BACTERIA BLD CULT: NORMAL
BACTERIA UR CULT: ABNORMAL
BASOPHILS # BLD AUTO: 0.05 K/UL (ref 0–0.2)
BASOPHILS NFR BLD: 0.4 % (ref 0–1.9)
BUN SERPL-MCNC: 57 MG/DL (ref 8–23)
CALCIUM SERPL-MCNC: 8.9 MG/DL (ref 8.7–10.5)
CHLORIDE SERPL-SCNC: 102 MMOL/L (ref 95–110)
CO2 SERPL-SCNC: 28 MMOL/L (ref 23–29)
CREAT SERPL-MCNC: 2.8 MG/DL (ref 0.5–1.4)
DIFFERENTIAL METHOD: ABNORMAL
EOSINOPHIL # BLD AUTO: 0.5 K/UL (ref 0–0.5)
EOSINOPHIL NFR BLD: 4.5 % (ref 0–8)
ERYTHROCYTE [DISTWIDTH] IN BLOOD BY AUTOMATED COUNT: 15.8 % (ref 11.5–14.5)
EST. GFR  (NO RACE VARIABLE): 23 ML/MIN/1.73 M^2
GLUCOSE SERPL-MCNC: 132 MG/DL (ref 70–110)
HCT VFR BLD AUTO: 23.1 % (ref 40–54)
HGB BLD-MCNC: 6.9 G/DL (ref 14–18)
HGB BLD-MCNC: 7.1 G/DL (ref 14–18)
IMM GRANULOCYTES # BLD AUTO: 0.03 K/UL (ref 0–0.04)
IMM GRANULOCYTES NFR BLD AUTO: 0.3 % (ref 0–0.5)
LYMPHOCYTES # BLD AUTO: 1.2 K/UL (ref 1–4.8)
LYMPHOCYTES NFR BLD: 10.6 % (ref 18–48)
MAGNESIUM SERPL-MCNC: 1.8 MG/DL (ref 1.6–2.6)
MCH RBC QN AUTO: 24.6 PG (ref 27–31)
MCHC RBC AUTO-ENTMCNC: 29.9 G/DL (ref 32–36)
MCV RBC AUTO: 83 FL (ref 82–98)
MONOCYTES # BLD AUTO: 1.1 K/UL (ref 0.3–1)
MONOCYTES NFR BLD: 9.4 % (ref 4–15)
NEUTROPHILS # BLD AUTO: 8.8 K/UL (ref 1.8–7.7)
NEUTROPHILS NFR BLD: 74.8 % (ref 38–73)
NRBC BLD-RTO: 0 /100 WBC
PLATELET # BLD AUTO: 305 K/UL (ref 150–450)
PMV BLD AUTO: 8.6 FL (ref 9.2–12.9)
POCT GLUCOSE: 133 MG/DL (ref 70–110)
POCT GLUCOSE: 156 MG/DL (ref 70–110)
POCT GLUCOSE: 164 MG/DL (ref 70–110)
POTASSIUM SERPL-SCNC: 3.5 MMOL/L (ref 3.5–5.1)
RBC # BLD AUTO: 2.8 M/UL (ref 4.6–6.2)
SODIUM SERPL-SCNC: 140 MMOL/L (ref 136–145)
WBC # BLD AUTO: 11.75 K/UL (ref 3.9–12.7)

## 2023-09-25 PROCEDURE — 96376 TX/PRO/DX INJ SAME DRUG ADON: CPT

## 2023-09-25 PROCEDURE — 63600175 PHARM REV CODE 636 W HCPCS: Performed by: STUDENT IN AN ORGANIZED HEALTH CARE EDUCATION/TRAINING PROGRAM

## 2023-09-25 PROCEDURE — 25000003 PHARM REV CODE 250: Performed by: EMERGENCY MEDICINE

## 2023-09-25 PROCEDURE — 85025 COMPLETE CBC W/AUTO DIFF WBC: CPT | Performed by: STUDENT IN AN ORGANIZED HEALTH CARE EDUCATION/TRAINING PROGRAM

## 2023-09-25 PROCEDURE — 36415 COLL VENOUS BLD VENIPUNCTURE: CPT | Performed by: STUDENT IN AN ORGANIZED HEALTH CARE EDUCATION/TRAINING PROGRAM

## 2023-09-25 PROCEDURE — A4216 STERILE WATER/SALINE, 10 ML: HCPCS | Performed by: EMERGENCY MEDICINE

## 2023-09-25 PROCEDURE — G0378 HOSPITAL OBSERVATION PER HR: HCPCS

## 2023-09-25 PROCEDURE — 80048 BASIC METABOLIC PNL TOTAL CA: CPT | Performed by: STUDENT IN AN ORGANIZED HEALTH CARE EDUCATION/TRAINING PROGRAM

## 2023-09-25 PROCEDURE — 25000003 PHARM REV CODE 250: Performed by: STUDENT IN AN ORGANIZED HEALTH CARE EDUCATION/TRAINING PROGRAM

## 2023-09-25 PROCEDURE — 85018 HEMOGLOBIN: CPT | Performed by: STUDENT IN AN ORGANIZED HEALTH CARE EDUCATION/TRAINING PROGRAM

## 2023-09-25 PROCEDURE — 96366 THER/PROPH/DIAG IV INF ADDON: CPT

## 2023-09-25 PROCEDURE — 83735 ASSAY OF MAGNESIUM: CPT | Performed by: STUDENT IN AN ORGANIZED HEALTH CARE EDUCATION/TRAINING PROGRAM

## 2023-09-25 RX ORDER — OXYCODONE AND ACETAMINOPHEN 10; 325 MG/1; MG/1
1 TABLET ORAL EVERY 4 HOURS PRN
Status: DISCONTINUED | OUTPATIENT
Start: 2023-09-25 | End: 2023-09-27 | Stop reason: HOSPADM

## 2023-09-25 RX ORDER — OXYCODONE AND ACETAMINOPHEN 5; 325 MG/1; MG/1
1 TABLET ORAL EVERY 4 HOURS PRN
Status: DISCONTINUED | OUTPATIENT
Start: 2023-09-25 | End: 2023-09-27 | Stop reason: HOSPADM

## 2023-09-25 RX ORDER — SODIUM CHLORIDE 0.9 % (FLUSH) 0.9 %
10 SYRINGE (ML) INJECTION
Status: DISCONTINUED | OUTPATIENT
Start: 2023-09-25 | End: 2023-09-27 | Stop reason: HOSPADM

## 2023-09-25 RX ADMIN — ALLOPURINOL 100 MG: 100 TABLET ORAL at 09:09

## 2023-09-25 RX ADMIN — DOXYCYCLINE HYCLATE 100 MG: 100 TABLET, COATED ORAL at 08:09

## 2023-09-25 RX ADMIN — APIXABAN 5 MG: 5 TABLET, FILM COATED ORAL at 09:09

## 2023-09-25 RX ADMIN — Medication 10 ML: at 06:09

## 2023-09-25 RX ADMIN — Medication 10 ML: at 05:09

## 2023-09-25 RX ADMIN — METOPROLOL SUCCINATE 25 MG: 25 TABLET, EXTENDED RELEASE ORAL at 09:09

## 2023-09-25 RX ADMIN — DOXYCYCLINE HYCLATE 100 MG: 100 TABLET, COATED ORAL at 09:09

## 2023-09-25 RX ADMIN — CEFTRIAXONE 2 G: 2 INJECTION, POWDER, FOR SOLUTION INTRAMUSCULAR; INTRAVENOUS at 01:09

## 2023-09-25 RX ADMIN — TAMSULOSIN HYDROCHLORIDE 0.4 MG: 0.4 CAPSULE ORAL at 09:09

## 2023-09-25 RX ADMIN — BUMETANIDE 2 MG: 0.25 INJECTION INTRAMUSCULAR; INTRAVENOUS at 09:09

## 2023-09-25 RX ADMIN — ATORVASTATIN CALCIUM 20 MG: 10 TABLET, FILM COATED ORAL at 09:09

## 2023-09-25 RX ADMIN — OXYCODONE HYDROCHLORIDE AND ACETAMINOPHEN 500 MG: 500 TABLET ORAL at 09:09

## 2023-09-25 RX ADMIN — Medication 10 ML: at 12:09

## 2023-09-25 RX ADMIN — APIXABAN 5 MG: 5 TABLET, FILM COATED ORAL at 08:09

## 2023-09-25 RX ADMIN — AMIODARONE HYDROCHLORIDE 200 MG: 200 TABLET ORAL at 09:09

## 2023-09-25 NOTE — TELEPHONE ENCOUNTER
I spoke with Ms. Leslye cardenas/ [facility] to schedule an appointment for patient with Dr. Vázquez. Ms. Leslye states that patient is in the hospital and she will contact our office when patient is discharged.

## 2023-09-25 NOTE — ASSESSMENT & PLAN NOTE
Pt with slight downtrend to below 7 this morning. Repeat pending to confirm. Pt appears to be baseline 7-8 hgb. No active bleeding at this time.    -repeat pending. Consider addition of 1 unit prbc if remains below 7.

## 2023-09-25 NOTE — PROGRESS NOTES
[facility] Medicine  Progress Note    Patient Name: Chato Bowie  MRN: [id number]  Patient Class: OP- Observation   Admission Date: 9/23/2023  Length of Stay: 0 days  Attending Physician: Jose L Evans III, MD  Primary Care Provider: Irlanda Valenzuela -        Subjective:     Principal Problem:Sepsis        HPI:  72M with pmh of hfref, CAD, DM, HTN, HLD, a fib who presents back due to fever and leukocytosis. Pt discharge order was placed the previous day 9/22, but did not leave until 5:15 on 9/23 prior to shift change to go back to his nursing facility. On review of the pts overnight vitals and labs pt noted to have a fever and new leukocytosis. I canceled pts discharge and alerted nurse of change of plan, but was informed pt had already left the facility. I called pts nursing home ([facility]) and spoke to a nurse. I informed them of his white count and fever overnight and advised that pt should be brought back to the emergency room for re-evaluation if there was not a provider in house. Nursing staff endorsed understanding and informed me they would start the process to have pt re-evaluated. Currently pt states he is feeling ok, but does endorse burning with urination. Denies hematuria, abd pain, n/v/d, cp, ha, blurry vision, sob, cough at this time.      Overview/Hospital Course:  72M with pmh of hfref, CAD, DM, HTN, HLD, a fib who presents back due to fever and leukocytosis. Pt discharge order was placed the previous day 9/22, but did not leave until 5:15 on 9/23 prior to shift change to go back to his nursing facility. On review of the pts overnight vitals and labs pt noted to have a fever and new leukocytosis. I canceled pts discharge and alerted nurse of change of plan, but was informed pt had already left the facility. I called pts nursing home ([facility]) and spoke to a nurse. I informed them of his white count and fever overnight and advised that pt should be  brought back to the emergency room for re-evaluation if there was not a provider in house. Nursing staff endorsed understanding and informed me they would start the process to have pt re-evaluated. Pt endorsed burning with urination. Pt started on abx for likely uti still pending cultures. On 9/23 pt with worsening urinary retention and jackson catheter placed. Urology consulted and recommend keeping jackson in place for 2 days prior to voiding trial attempt.      Interval History: Pt states he is doing fine currently asking for change in pain medication for chronic knee pain otherwise no complaints. No fever, chills, cp or sob    Review of Systems  Objective:     Vital Signs (Most Recent):  Temp: 97.9 °F (36.6 °C) (09/25/23 1149)  Pulse: 62 (09/25/23 1149)  Resp: 17 (09/25/23 1149)  BP: (!) 157/69 (09/25/23 1149)  SpO2: 98 % (09/25/23 1149) Vital Signs (24h Range):  Temp:  [97.9 °F (36.6 °C)-98.7 °F (37.1 °C)] 97.9 °F (36.6 °C)  Pulse:  [62-64] 62  Resp:  [16-22] 17  SpO2:  [94 %-98 %] 98 %  BP: (119-157)/(59-71) 157/69     Weight: 95.2 kg (209 lb 14.1 oz)  Body mass index is 29.27 kg/m².    Intake/Output Summary (Last 24 hours) at 9/25/2023 1358  Last data filed at 9/25/2023 0645  Gross per 24 hour   Intake 240 ml   Output 1850 ml   Net -1610 ml         Physical Exam      Vitals reviewed.   HENT:      Head: Normocephalic and atraumatic.      Mouth/Throat:      Mouth: Mucous membranes are dry.      Pharynx: Oropharynx is clear.   Eyes:      General: No scleral icterus.     Extraocular Movements: Extraocular movements intact.   Abdominal:      General: There is no distension.      Palpations: Abdomen is soft.      Tenderness: There is no abdominal tenderness. There is no guarding or rebound.   Musculoskeletal:         General: Swelling present significantly improved.      Cervical back: Neck supple. No rigidity.   Skin:     General: Skin is warm and dry.   Neurological:      General: No focal deficit present.       Mental Status: He is alert and oriented to person, place, and time.   Psychiatric:         Mood and Affect: Mood normal.         Behavior: Behavior normal.   Significant Labs: All pertinent labs within the past 24 hours have been reviewed.    Significant Imaging: I have reviewed all pertinent imaging results/findings within the past 24 hours.      Assessment/Plan:      * Sepsis  This patient does have evidence of infective focus  My overall impression is sepsis.  Source: Respiratory and Urinary Tract  Antibiotics given-   Antibiotics (72h ago, onward)    Start     Stop Route Frequency Ordered    09/24/23 0000  cefTRIAXone (ROCEPHIN) 2 g in dextrose 5 % in water (D5W) 100 mL IVPB (MB+)         -- IV Every 24 hours (non-standard times) 09/23/23 1153    09/23/23 1300  doxycycline tablet 100 mg         -- Oral Every 12 hours 09/23/23 1153        Latest lactate reviewed-  Recent Labs   Lab 09/23/23  0930   LACTATE 0.9     Organ dysfunction indicated by Acute kidney injury    Fluid challenge Contraindicated- Fluid bolus is contraindicated in this patient due to Congestive Heart Failure     Post- resuscitation assessment No - Post resuscitation assessment not needed       Will Not start Pressors- Levophed for MAP of 65  Source control achieved by: Pt presenting with fever and leukocytosis with concerns for possible respiratory vs urinary infection.   -Urine cultures with klebsiella sensitive to rocephin.  -bc ngtd    -ceftriaxone for abx coverage. Doxy stopped as appears to be a urinary source      Urinary retention  Pt with persistent rentention overnight of 9/23 with failed attempts at straight cath. De Leon catheter placed.    -urology consulted recommend 48 hours with catheter in place. Attempt voiding trial 9/26      ACP (advance care planning)    Advance Care Planning     Date: 09/23/2023  Pt would like to be full code 2 minutes discussing acp. Palliative care followed pt.        UTI (urinary tract infection)  tx as  stated above    Acute on chronic diastolic (congestive) heart failure  Patient is identified as having Diastolic (HFpEF) heart failure that is Acute on chronic. CHF is currently uncontrolled due to Continued edema of extremities. Latest ECHO performed and demonstrates- Results for orders placed during the hospital encounter of 09/19/23    Echo    Interpretation Summary    Left Ventricle: The left ventricle is normal in size. Normal wall thickness. Normal wall motion. There is normal systolic function with a visually estimated ejection fraction of 55 - 60%. Grade III diastolic dysfunction.    Left Atrium: Left atrium is severely dilated.    Right Ventricle: Moderate right ventricular enlargement. Systolic function is normal.    Right Atrium: Right atrium is severely dilated.    Aortic Valve: There is severe stenosis. Aortic valve area by VTI is 1.00 cm². Aortic valve peak velocity is 4.21 m/s. Mean gradient is 44 mmHg. The dimensionless index is 0.27. There is moderate aortic regurgitation.    Mitral Valve: There is moderate regurgitation.    Tricuspid Valve: There is moderate regurgitation.    Pulmonary Artery: The estimated pulmonary artery systolic pressure is 80 mmHg.    IVC/SVC: Elevated venous pressure at 15 mmHg.    1 cm x 1.4 cm mass seen in some views in [place] near the MV.. recommend MARIA if clinically indicated.  . Continue Beta Blocker and monitor clinical status closely. Monitor on telemetry. Patient is on CHF pathway.  Monitor strict Is&Os and daily weights.  Place on fluid restriction of 1.5 L. Continue to stress to patient importance of self efficacy and  on diet for CHF. Last BNP reviewed- and noted below   Recent Labs   Lab 09/19/23  1212   BNP 1,100*   -signficant improvement in bilateral LE swelling on exam. Currently on bumex with good diuresis. May be able to switch to po in the next day.    CKD (chronic kidney disease), stage IV  Pt with stable if not improving renal function.      -continue to monitor and renally dose medications  -outpatient nephrology f/u    Paroxysmal atrial flutter  Pt with h/o a fib on eliquis and metoprolol at home. Currently rate controlled    -continue eliquis and metoprolol    Severe aortic valve stenosis  Outpatient f/u      BPH (benign prostatic hyperplasia)  Continue home medications. Urology following      Hypokalemia  Continue to replete prn as pt on iv diuresis      Anemia  Pt with slight downtrend to below 7 this morning. Repeat pending to confirm. Pt appears to be baseline 7-8 hgb. No active bleeding at this time.    -repeat pending. Consider addition of 1 unit prbc if remains below 7.    Type 2 diabetes mellitus with kidney complication, with long-term current use of insulin  Patient's FSGs are controlled on current medication regimen.  Last A1c reviewed-   Lab Results   Component Value Date    HGBA1C 5.5 09/20/2023     Most recent fingerstick glucose reviewed-   Recent Labs   Lab 09/24/23  1627 09/24/23  2222   POCTGLUCOSE 151* 156*     Current correctional scale  Low   anti-hyperglycemic dose as follows-   Antihyperglycemics (From admission, onward)    Start     Stop Route Frequency Ordered    09/23/23 1251  insulin aspart U-100 pen 0-5 Units         -- SubQ Before meals & nightly PRN 09/23/23 1152        Hold Oral hypoglycemics while patient is in the hospital.      VTE Risk Mitigation (From admission, onward)         Ordered     apixaban tablet 5 mg  2 times daily         09/23/23 1152     Reason for No Pharmacological VTE Prophylaxis  Once        Question:  Reasons:  Answer:  Already adequately anticoagulated on oral Anticoagulants    09/23/23 1152     IP VTE HIGH RISK PATIENT  Once         09/23/23 1152     Place sequential compression device  Until discontinued         09/23/23 1152                Discharge Planning   ELY:      Code Status: Full Code   Is the patient medically ready for discharge?:     Reason for patient still in hospital (select  all that apply): Laboratory test, Treatment and Consult recommendations  Discharge Plan A: Return to nursing home ([facility])                  Jose L Evans III, MD  Department of Hospital Medicine   [facility]

## 2023-09-25 NOTE — TELEPHONE ENCOUNTER
----- Message from Krystyna Cam sent at 9/21/2023  2:55 PM CDT -----  Type:  Patient Returning Call    Who Called:  Leslye     Who Left Message for Patient:  Dina    Does the patient know what this is regarding?: yes    Would the patient rather a call back or a response via [facility]?  call    Best Call Back Number: [phone]

## 2023-09-25 NOTE — NURSING
[facility], [facility]  Nurses Note -- 4 Eyes      9/25/2023       Skin assessed on: Q Shift      [] No Pressure Injuries Present    []Prevention Measures Documented    [x] Yes LDA  for Pressure Injury Previously documented     [] Yes New Pressure Injury Discovered   [] LDA for New Pressure Injury Added      Attending RN:  Ghazal Grant RN     Second RN:  JOYCE Colindres

## 2023-09-25 NOTE — SUBJECTIVE & OBJECTIVE
Interval History: Pt states he is doing fine currently asking for change in pain medication for chronic knee pain otherwise no complaints. No fever, chills, cp or sob    Review of Systems  Objective:     Vital Signs (Most Recent):  Temp: 97.9 °F (36.6 °C) (09/25/23 1149)  Pulse: 62 (09/25/23 1149)  Resp: 17 (09/25/23 1149)  BP: (!) 157/69 (09/25/23 1149)  SpO2: 98 % (09/25/23 1149) Vital Signs (24h Range):  Temp:  [97.9 °F (36.6 °C)-98.7 °F (37.1 °C)] 97.9 °F (36.6 °C)  Pulse:  [62-64] 62  Resp:  [16-22] 17  SpO2:  [94 %-98 %] 98 %  BP: (119-157)/(59-71) 157/69     Weight: 95.2 kg (209 lb 14.1 oz)  Body mass index is 29.27 kg/m².    Intake/Output Summary (Last 24 hours) at 9/25/2023 1358  Last data filed at 9/25/2023 0645  Gross per 24 hour   Intake 240 ml   Output 1850 ml   Net -1610 ml         Physical Exam      Vitals reviewed.   HENT:      Head: Normocephalic and atraumatic.      Mouth/Throat:      Mouth: Mucous membranes are dry.      Pharynx: Oropharynx is clear.   Eyes:      General: No scleral icterus.     Extraocular Movements: Extraocular movements intact.   Abdominal:      General: There is no distension.      Palpations: Abdomen is soft.      Tenderness: There is no abdominal tenderness. There is no guarding or rebound.   Musculoskeletal:         General: Swelling present significantly improved.      Cervical back: Neck supple. No rigidity.   Skin:     General: Skin is warm and dry.   Neurological:      General: No focal deficit present.      Mental Status: He is alert and oriented to person, place, and time.   Psychiatric:         Mood and Affect: Mood normal.         Behavior: Behavior normal.   Significant Labs: All pertinent labs within the past 24 hours have been reviewed.    Significant Imaging: I have reviewed all pertinent imaging results/findings within the past 24 hours.

## 2023-09-25 NOTE — ASSESSMENT & PLAN NOTE
Patient is identified as having Diastolic (HFpEF) heart failure that is Acute on chronic. CHF is currently uncontrolled due to Continued edema of extremities. Latest ECHO performed and demonstrates- Results for orders placed during the hospital encounter of 09/19/23    Echo    Interpretation Summary    Left Ventricle: The left ventricle is normal in size. Normal wall thickness. Normal wall motion. There is normal systolic function with a visually estimated ejection fraction of 55 - 60%. Grade III diastolic dysfunction.    Left Atrium: Left atrium is severely dilated.    Right Ventricle: Moderate right ventricular enlargement. Systolic function is normal.    Right Atrium: Right atrium is severely dilated.    Aortic Valve: There is severe stenosis. Aortic valve area by VTI is 1.00 cm². Aortic valve peak velocity is 4.21 m/s. Mean gradient is 44 mmHg. The dimensionless index is 0.27. There is moderate aortic regurgitation.    Mitral Valve: There is moderate regurgitation.    Tricuspid Valve: There is moderate regurgitation.    Pulmonary Artery: The estimated pulmonary artery systolic pressure is 80 mmHg.    IVC/SVC: Elevated venous pressure at 15 mmHg.    1 cm x 1.4 cm mass seen in some views in [place] near the MV.. recommend MARIA if clinically indicated.  . Continue Beta Blocker and monitor clinical status closely. Monitor on telemetry. Patient is on CHF pathway.  Monitor strict Is&Os and daily weights.  Place on fluid restriction of 1.5 L. Continue to stress to patient importance of self efficacy and  on diet for CHF. Last BNP reviewed- and noted below   Recent Labs   Lab 09/19/23  1212   BNP 1,100*   -signficant improvement in bilateral LE swelling on exam. Currently on bumex with good diuresis. May be able to switch to po in the next day.

## 2023-09-25 NOTE — NURSING
[facility], [facility] [place]  Nurses Note -- 4 Eyes      9/24/2023       Skin assessed on: Q Shift      [] No Pressure Injuries Present    []Prevention Measures Documented    [x] Yes LDA  for Pressure Injury Previously documented     [] Yes New Pressure Injury Discovered   [] LDA for New Pressure Injury Added      Attending RN:  Bernadine England RN     Second RN:  Ghazal Grant RN

## 2023-09-25 NOTE — ASSESSMENT & PLAN NOTE
This patient does have evidence of infective focus  My overall impression is sepsis.  Source: Respiratory and Urinary Tract  Antibiotics given-   Antibiotics (72h ago, onward)    Start     Stop Route Frequency Ordered    09/24/23 0000  cefTRIAXone (ROCEPHIN) 2 g in dextrose 5 % in water (D5W) 100 mL IVPB (MB+)         -- IV Every 24 hours (non-standard times) 09/23/23 1153    09/23/23 1300  doxycycline tablet 100 mg         -- Oral Every 12 hours 09/23/23 1153        Latest lactate reviewed-  Recent Labs   Lab 09/23/23  0930   LACTATE 0.9     Organ dysfunction indicated by Acute kidney injury    Fluid challenge Contraindicated- Fluid bolus is contraindicated in this patient due to Congestive Heart Failure     Post- resuscitation assessment No - Post resuscitation assessment not needed       Will Not start Pressors- Levophed for MAP of 65  Source control achieved by: Pt presenting with fever and leukocytosis with concerns for possible respiratory vs urinary infection.   -Urine cultures with klebsiella sensitive to rocephin.  -bc ngtd    -ceftriaxone for abx coverage. Doxy stopped as appears to be a urinary source

## 2023-09-25 NOTE — PLAN OF CARE
Problem: Adult Inpatient Plan of Care  Goal: Plan of Care Review  Outcome: Ongoing, Progressing  Goal: Patient-Specific Goal (Individualized)  Outcome: Ongoing, Progressing  Goal: Absence of Hospital-Acquired Illness or Injury  Outcome: Ongoing, Progressing  Goal: Optimal Comfort and Wellbeing  Outcome: Ongoing, Progressing  Goal: Readiness for Transition of Care  Outcome: Ongoing, Progressing     Problem: Diabetes Comorbidity  Goal: Blood Glucose Level Within Targeted Range  Outcome: Ongoing, Progressing     Problem: Adjustment to Illness (Sepsis/Septic Shock)  Goal: Optimal Coping  Outcome: Ongoing, Progressing     Problem: Bleeding (Sepsis/Septic Shock)  Goal: Absence of Bleeding  Outcome: Ongoing, Progressing     Problem: Glycemic Control Impaired (Sepsis/Septic Shock)  Goal: Blood Glucose Level Within Desired Range  Outcome: Ongoing, Progressing     Problem: Infection Progression (Sepsis/Septic Shock)  Goal: Absence of Infection Signs and Symptoms  Outcome: Ongoing, Progressing     Problem: Nutrition Impaired (Sepsis/Septic Shock)  Goal: Optimal Nutrition Intake  Outcome: Ongoing, Progressing     Problem: Fluid and Electrolyte Imbalance (Acute Kidney Injury/Impairment)  Goal: Fluid and Electrolyte Balance  Outcome: Ongoing, Progressing     Problem: Oral Intake Inadequate (Acute Kidney Injury/Impairment)  Goal: Optimal Nutrition Intake  Outcome: Ongoing, Progressing     Problem: Renal Function Impairment (Acute Kidney Injury/Impairment)  Goal: Effective Renal Function  Outcome: Ongoing, Progressing     Problem: Infection  Goal: Absence of Infection Signs and Symptoms  Outcome: Ongoing, Progressing     Problem: Impaired Wound Healing  Goal: Optimal Wound Healing  Outcome: Ongoing, Progressing     Problem: Skin Injury Risk Increased  Goal: Skin Health and Integrity  Outcome: Ongoing, Progressing

## 2023-09-25 NOTE — ASSESSMENT & PLAN NOTE
Patient's FSGs are controlled on current medication regimen.  Last A1c reviewed-   Lab Results   Component Value Date    HGBA1C 5.5 09/20/2023     Most recent fingerstick glucose reviewed-   Recent Labs   Lab 09/24/23  1627 09/24/23  2222   POCTGLUCOSE 151* 156*     Current correctional scale  Low   anti-hyperglycemic dose as follows-   Antihyperglycemics (From admission, onward)    Start     Stop Route Frequency Ordered    09/23/23 1251  insulin aspart U-100 pen 0-5 Units         -- SubQ Before meals & nightly PRN 09/23/23 1152        Hold Oral hypoglycemics while patient is in the hospital.

## 2023-09-25 NOTE — ASSESSMENT & PLAN NOTE
Pt with stable if not improving renal function.     -continue to monitor and renally dose medications  -outpatient nephrology f/u

## 2023-09-25 NOTE — NURSING
PER handoff received from JOYCE Colindres     Pt resting in bed quietly. NAD noted. No c/o pain.  Fall and safety precautions maintained. Bed alarm activated and audible.. Bed locked in lowest position, with side rails up x2. Call bell and personal items within reach

## 2023-09-25 NOTE — ASSESSMENT & PLAN NOTE
Pt with persistent rentention overnight of 9/23 with failed attempts at straight cath. De Leon catheter placed.    -urology consulted recommend 48 hours with catheter in place. Attempt voiding trial 9/26

## 2023-09-26 ENCOUNTER — TELEPHONE (OUTPATIENT)
Dept: HEMATOLOGY/ONCOLOGY | Facility: CLINIC | Age: 73
End: 2023-09-26
Payer: MEDICARE

## 2023-09-26 LAB
ANION GAP SERPL CALC-SCNC: 9 MMOL/L (ref 8–16)
BASOPHILS # BLD AUTO: 0.04 K/UL (ref 0–0.2)
BASOPHILS NFR BLD: 0.5 % (ref 0–1.9)
BUN SERPL-MCNC: 52 MG/DL (ref 8–23)
CALCIUM SERPL-MCNC: 9.1 MG/DL (ref 8.7–10.5)
CHLORIDE SERPL-SCNC: 101 MMOL/L (ref 95–110)
CO2 SERPL-SCNC: 28 MMOL/L (ref 23–29)
CREAT SERPL-MCNC: 2.5 MG/DL (ref 0.5–1.4)
DIFFERENTIAL METHOD: ABNORMAL
EOSINOPHIL # BLD AUTO: 0.5 K/UL (ref 0–0.5)
EOSINOPHIL NFR BLD: 6 % (ref 0–8)
ERYTHROCYTE [DISTWIDTH] IN BLOOD BY AUTOMATED COUNT: 15.7 % (ref 11.5–14.5)
EST. GFR  (NO RACE VARIABLE): 27 ML/MIN/1.73 M^2
GLUCOSE SERPL-MCNC: 109 MG/DL (ref 70–110)
HCT VFR BLD AUTO: 24.1 % (ref 40–54)
HGB BLD-MCNC: 7.3 G/DL (ref 14–18)
IMM GRANULOCYTES # BLD AUTO: 0.03 K/UL (ref 0–0.04)
IMM GRANULOCYTES NFR BLD AUTO: 0.4 % (ref 0–0.5)
LYMPHOCYTES # BLD AUTO: 1.1 K/UL (ref 1–4.8)
LYMPHOCYTES NFR BLD: 13.1 % (ref 18–48)
MAGNESIUM SERPL-MCNC: 1.9 MG/DL (ref 1.6–2.6)
MCH RBC QN AUTO: 24.2 PG (ref 27–31)
MCHC RBC AUTO-ENTMCNC: 30.3 G/DL (ref 32–36)
MCV RBC AUTO: 80 FL (ref 82–98)
MONOCYTES # BLD AUTO: 1 K/UL (ref 0.3–1)
MONOCYTES NFR BLD: 12.4 % (ref 4–15)
NEUTROPHILS # BLD AUTO: 5.6 K/UL (ref 1.8–7.7)
NEUTROPHILS NFR BLD: 67.6 % (ref 38–73)
NRBC BLD-RTO: 0 /100 WBC
PLATELET # BLD AUTO: 310 K/UL (ref 150–450)
PMV BLD AUTO: 8.5 FL (ref 9.2–12.9)
POCT GLUCOSE: 130 MG/DL (ref 70–110)
POCT GLUCOSE: 148 MG/DL (ref 70–110)
POCT GLUCOSE: 148 MG/DL (ref 70–110)
POCT GLUCOSE: 195 MG/DL (ref 70–110)
POTASSIUM SERPL-SCNC: 3 MMOL/L (ref 3.5–5.1)
RBC # BLD AUTO: 3.02 M/UL (ref 4.6–6.2)
SODIUM SERPL-SCNC: 138 MMOL/L (ref 136–145)
WBC # BLD AUTO: 8.33 K/UL (ref 3.9–12.7)

## 2023-09-26 PROCEDURE — 83735 ASSAY OF MAGNESIUM: CPT | Performed by: STUDENT IN AN ORGANIZED HEALTH CARE EDUCATION/TRAINING PROGRAM

## 2023-09-26 PROCEDURE — 21400001 HC TELEMETRY ROOM

## 2023-09-26 PROCEDURE — 85025 COMPLETE CBC W/AUTO DIFF WBC: CPT | Performed by: STUDENT IN AN ORGANIZED HEALTH CARE EDUCATION/TRAINING PROGRAM

## 2023-09-26 PROCEDURE — 80048 BASIC METABOLIC PNL TOTAL CA: CPT | Performed by: STUDENT IN AN ORGANIZED HEALTH CARE EDUCATION/TRAINING PROGRAM

## 2023-09-26 PROCEDURE — 63600175 PHARM REV CODE 636 W HCPCS: Performed by: STUDENT IN AN ORGANIZED HEALTH CARE EDUCATION/TRAINING PROGRAM

## 2023-09-26 PROCEDURE — 94761 N-INVAS EAR/PLS OXIMETRY MLT: CPT

## 2023-09-26 PROCEDURE — 25000003 PHARM REV CODE 250: Performed by: EMERGENCY MEDICINE

## 2023-09-26 PROCEDURE — 96376 TX/PRO/DX INJ SAME DRUG ADON: CPT

## 2023-09-26 PROCEDURE — 36415 COLL VENOUS BLD VENIPUNCTURE: CPT | Performed by: STUDENT IN AN ORGANIZED HEALTH CARE EDUCATION/TRAINING PROGRAM

## 2023-09-26 PROCEDURE — 25000003 PHARM REV CODE 250: Performed by: STUDENT IN AN ORGANIZED HEALTH CARE EDUCATION/TRAINING PROGRAM

## 2023-09-26 PROCEDURE — A4216 STERILE WATER/SALINE, 10 ML: HCPCS | Performed by: EMERGENCY MEDICINE

## 2023-09-26 PROCEDURE — 99900035 HC TECH TIME PER 15 MIN (STAT)

## 2023-09-26 RX ORDER — POTASSIUM CHLORIDE 20 MEQ/1
40 TABLET, EXTENDED RELEASE ORAL ONCE
Status: DISCONTINUED | OUTPATIENT
Start: 2023-09-26 | End: 2023-09-26

## 2023-09-26 RX ORDER — POTASSIUM CHLORIDE 20 MEQ/1
40 TABLET, EXTENDED RELEASE ORAL EVERY 4 HOURS
Status: COMPLETED | OUTPATIENT
Start: 2023-09-26 | End: 2023-09-26

## 2023-09-26 RX ADMIN — CEFTRIAXONE 2 G: 2 INJECTION, POWDER, FOR SOLUTION INTRAMUSCULAR; INTRAVENOUS at 01:09

## 2023-09-26 RX ADMIN — POTASSIUM CHLORIDE 40 MEQ: 1500 TABLET, EXTENDED RELEASE ORAL at 05:09

## 2023-09-26 RX ADMIN — ATORVASTATIN CALCIUM 20 MG: 10 TABLET, FILM COATED ORAL at 09:09

## 2023-09-26 RX ADMIN — POTASSIUM CHLORIDE 40 MEQ: 1500 TABLET, EXTENDED RELEASE ORAL at 09:09

## 2023-09-26 RX ADMIN — TAMSULOSIN HYDROCHLORIDE 0.4 MG: 0.4 CAPSULE ORAL at 09:09

## 2023-09-26 RX ADMIN — DOXYCYCLINE HYCLATE 100 MG: 100 TABLET, COATED ORAL at 09:09

## 2023-09-26 RX ADMIN — AMIODARONE HYDROCHLORIDE 200 MG: 200 TABLET ORAL at 09:09

## 2023-09-26 RX ADMIN — OXYCODONE HYDROCHLORIDE AND ACETAMINOPHEN 500 MG: 500 TABLET ORAL at 09:09

## 2023-09-26 RX ADMIN — Medication 10 ML: at 06:09

## 2023-09-26 RX ADMIN — Medication 10 ML: at 01:09

## 2023-09-26 RX ADMIN — METOPROLOL SUCCINATE 25 MG: 25 TABLET, EXTENDED RELEASE ORAL at 09:09

## 2023-09-26 RX ADMIN — ALLOPURINOL 100 MG: 100 TABLET ORAL at 09:09

## 2023-09-26 RX ADMIN — BUMETANIDE 2 MG: 0.25 INJECTION INTRAMUSCULAR; INTRAVENOUS at 09:09

## 2023-09-26 RX ADMIN — APIXABAN 5 MG: 5 TABLET, FILM COATED ORAL at 09:09

## 2023-09-26 RX ADMIN — Medication 10 ML: at 12:09

## 2023-09-26 RX ADMIN — Medication 10 ML: at 05:09

## 2023-09-26 NOTE — PROGRESS NOTES
[facility] Medicine  Progress Note    Patient Name: Chato Bowie  MRN: [id number]  Patient Class: OP- Observation   Admission Date: 9/23/2023  Length of Stay: 0 days  Attending Physician: Jose L Evans III, MD  Primary Care Provider: Irlanda Valenzuela -        Subjective:     Principal Problem:Sepsis        HPI:  72M with pmh of hfref, CAD, DM, HTN, HLD, a fib who presents back due to fever and leukocytosis. Pt discharge order was placed the previous day 9/22, but did not leave until 5:15 on 9/23 prior to shift change to go back to his nursing facility. On review of the pts overnight vitals and labs pt noted to have a fever and new leukocytosis. I canceled pts discharge and alerted nurse of change of plan, but was informed pt had already left the facility. I called pts nursing home ([facility]) and spoke to a nurse. I informed them of his white count and fever overnight and advised that pt should be brought back to the emergency room for re-evaluation if there was not a provider in house. Nursing staff endorsed understanding and informed me they would start the process to have pt re-evaluated. Currently pt states he is feeling ok, but does endorse burning with urination. Denies hematuria, abd pain, n/v/d, cp, ha, blurry vision, sob, cough at this time.      Overview/Hospital Course:  72M with pmh of hfref, CAD, DM, HTN, HLD, a fib who presents back due to fever and leukocytosis. Pt discharge order was placed the previous day 9/22, but did not leave until 5:15 on 9/23 prior to shift change to go back to his nursing facility. On review of the pts overnight vitals and labs pt noted to have a fever and new leukocytosis. I canceled pts discharge and alerted nurse of change of plan, but was informed pt had already left the facility. I called pts nursing home ([facility]) and spoke to a nurse. I informed them of his white count and fever overnight and advised that pt should be  brought back to the emergency room for re-evaluation if there was not a provider in house. Nursing staff endorsed understanding and informed me they would start the process to have pt re-evaluated. Pt endorsed burning with urination. Pt started on abx for likely uti still pending cultures. On 9/23 pt with worsening urinary retention and jackson catheter placed. Urology consulted and recommend keeping jackson in place for 2 days prior to voiding trial attempt.      Interval History: Pt states he is feeling better, but still has a lot more swelling than at normal. Pt denies cp, sob, fever, chills.     Review of Systems  Objective:     Vital Signs (Most Recent):  Temp: 98.5 °F (36.9 °C) (09/26/23 1151)  Pulse: 64 (09/26/23 1151)  Resp: 18 (09/26/23 1151)  BP: 129/65 (09/26/23 1151)  SpO2: 98 % (09/26/23 1151) Vital Signs (24h Range):  Temp:  [97.6 °F (36.4 °C)-99.5 °F (37.5 °C)] 98.5 °F (36.9 °C)  Pulse:  [59-64] 64  Resp:  [17-20] 18  SpO2:  [95 %-99 %] 98 %  BP: (122-140)/(58-65) 129/65     Weight: 95.2 kg (209 lb 14.1 oz)  Body mass index is 29.27 kg/m².    Intake/Output Summary (Last 24 hours) at 9/26/2023 1401  Last data filed at 9/26/2023 1151  Gross per 24 hour   Intake 480 ml   Output 1280 ml   Net -800 ml         Physical Exam  Vitals reviewed.   Constitutional:       General: He is not in acute distress (jackson in place draining yellow urine.).     Appearance: He is ill-appearing (chronic).   HENT:      Head: Normocephalic and atraumatic.   Eyes:      General: No scleral icterus.     Extraocular Movements: Extraocular movements intact.   Cardiovascular:      Rate and Rhythm: Rhythm irregular.   Pulmonary:      Effort: Pulmonary effort is normal. No respiratory distress.   Abdominal:      General: There is no distension.      Palpations: Abdomen is soft.      Tenderness: There is no abdominal tenderness. There is no guarding or rebound.   Musculoskeletal:         General: Swelling (pitting edema up to the knee)  "present.      Cervical back: Neck supple. No rigidity.   Skin:     General: Skin is warm and dry.   Neurological:      General: No focal deficit present.      Mental Status: He is alert and oriented to person, place, and time.             Significant Labs: All pertinent labs within the past 24 hours have been reviewed.    Significant Imaging: I have reviewed all pertinent imaging results/findings within the past 24 hours.      Assessment/Plan:      * Sepsis  This patient does have evidence of infective focus  My overall impression is sepsis.  Source: Respiratory and Urinary Tract  Antibiotics given-   Antibiotics (72h ago, onward)    Start     Stop Route Frequency Ordered    09/24/23 0000  cefTRIAXone (ROCEPHIN) 2 g in dextrose 5 % in water (D5W) 100 mL IVPB (MB+)         -- IV Every 24 hours (non-standard times) 09/23/23 1153    09/23/23 1300  doxycycline tablet 100 mg         -- Oral Every 12 hours 09/23/23 1153        Latest lactate reviewed-  No results for input(s): "LACTATE" in the last 72 hours.  Organ dysfunction indicated by Acute kidney injury    Fluid challenge Contraindicated- Fluid bolus is contraindicated in this patient due to Congestive Heart Failure     Post- resuscitation assessment No - Post resuscitation assessment not needed       Will Not start Pressors- Levophed for MAP of 65  Source control achieved by: Pt presenting with fever and leukocytosis with concerns for possible respiratory vs urinary infection.   -Urine cultures with klebsiella sensitive to rocephin.  -bc ngtd    -ceftriaxone for abx coverage. Doxy stopped as appears to be a urinary source      Urinary retention  Pt with persistent rentention overnight of 9/23 with failed attempts at straight cath. De Leon catheter placed.    -urology consulted recommend 48 hours with catheter in place. Attempt voiding trial 9/26      ACP (advance care planning)    Advance Care Planning     Date: 09/23/2023  Pt would like to be full code 2 minutes " "discussing acp. Palliative care followed pt.        UTI (urinary tract infection)  tx as stated above    Acute on chronic diastolic (congestive) heart failure  Patient is identified as having Diastolic (HFpEF) heart failure that is Acute on chronic. CHF is currently uncontrolled due to Continued edema of extremities. Latest ECHO performed and demonstrates- Results for orders placed during the hospital encounter of 09/19/23    Echo    Interpretation Summary    Left Ventricle: The left ventricle is normal in size. Normal wall thickness. Normal wall motion. There is normal systolic function with a visually estimated ejection fraction of 55 - 60%. Grade III diastolic dysfunction.    Left Atrium: Left atrium is severely dilated.    Right Ventricle: Moderate right ventricular enlargement. Systolic function is normal.    Right Atrium: Right atrium is severely dilated.    Aortic Valve: There is severe stenosis. Aortic valve area by VTI is 1.00 cm². Aortic valve peak velocity is 4.21 m/s. Mean gradient is 44 mmHg. The dimensionless index is 0.27. There is moderate aortic regurgitation.    Mitral Valve: There is moderate regurgitation.    Tricuspid Valve: There is moderate regurgitation.    Pulmonary Artery: The estimated pulmonary artery systolic pressure is 80 mmHg.    IVC/SVC: Elevated venous pressure at 15 mmHg.    1 cm x 1.4 cm mass seen in some views in [place] near the MV.. recommend MARIA if clinically indicated.  . Continue Beta Blocker and monitor clinical status closely. Monitor on telemetry. Patient is on CHF pathway.  Monitor strict Is&Os and daily weights.  Place on fluid restriction of 1.5 L. Continue to stress to patient importance of self efficacy and  on diet for CHF. Last BNP reviewed- and noted below   No results for input(s): "BNP", "BNPTRIAGEBLO" in the last 168 hours.-signficant improvement in bilateral LE swelling on exam. Currently on bumex with good diuresis. May be able to switch to po in " the next day.    CKD (chronic kidney disease), stage IV  Pt with stable if not improving renal function.     -continue to monitor and renally dose medications  -outpatient nephrology f/u    Paroxysmal atrial flutter  Pt with h/o a fib on eliquis and metoprolol at home. Currently rate controlled    -continue eliquis and metoprolol    Severe aortic valve stenosis  Outpatient f/u      BPH (benign prostatic hyperplasia)  Continue home medications. Urology following      Hypokalemia  Continue to replete prn as pt on iv diuresis      Anemia  Pt with slight downtrend to below 7 this morning. Repeat pending to confirm. Pt appears to be baseline 7-8 hgb. No active bleeding at this time.    -stable    Type 2 diabetes mellitus with kidney complication, with long-term current use of insulin  Patient's FSGs are controlled on current medication regimen.  Last A1c reviewed-   Lab Results   Component Value Date    HGBA1C 5.5 09/20/2023     Most recent fingerstick glucose reviewed-   Recent Labs   Lab 09/26/23  0812 09/26/23  1153   POCTGLUCOSE 130* 195*     Current correctional scale  Low   anti-hyperglycemic dose as follows-   Antihyperglycemics (From admission, onward)    Start     Stop Route Frequency Ordered    09/23/23 1251  insulin aspart U-100 pen 0-5 Units         -- SubQ Before meals & nightly PRN 09/23/23 1152        Hold Oral hypoglycemics while patient is in the hospital.      VTE Risk Mitigation (From admission, onward)         Ordered     apixaban tablet 5 mg  2 times daily         09/23/23 1152     Reason for No Pharmacological VTE Prophylaxis  Once        Question:  Reasons:  Answer:  Already adequately anticoagulated on oral Anticoagulants    09/23/23 1152     IP VTE HIGH RISK PATIENT  Once         09/23/23 1152     Place sequential compression device  Until discontinued         09/23/23 1152                Discharge Planning   ELY: 9/26/2023     Code Status: Full Code   Is the patient medically ready for  discharge?:     Reason for patient still in hospital (select all that apply): Laboratory test and Treatment  Discharge Plan A: Return to nursing home ([facility])                  Jose L Evans III, MD  Department of [facility] Medicine   [facility]

## 2023-09-26 NOTE — NURSING
De Leon Replaced with urine returned.     Patient's penis Edematous, Tender. Foreskin is discolored, Red, Bleeding. Some pus drainage.

## 2023-09-26 NOTE — ASSESSMENT & PLAN NOTE
"This patient does have evidence of infective focus  My overall impression is sepsis.  Source: Respiratory and Urinary Tract  Antibiotics given-   Antibiotics (72h ago, onward)    Start     Stop Route Frequency Ordered    09/24/23 0000  cefTRIAXone (ROCEPHIN) 2 g in dextrose 5 % in water (D5W) 100 mL IVPB (MB+)         -- IV Every 24 hours (non-standard times) 09/23/23 1153    09/23/23 1300  doxycycline tablet 100 mg         -- Oral Every 12 hours 09/23/23 1153        Latest lactate reviewed-  No results for input(s): "LACTATE" in the last 72 hours.  Organ dysfunction indicated by Acute kidney injury    Fluid challenge Contraindicated- Fluid bolus is contraindicated in this patient due to Congestive Heart Failure     Post- resuscitation assessment No - Post resuscitation assessment not needed       Will Not start Pressors- Levophed for MAP of 65  Source control achieved by: Pt presenting with fever and leukocytosis with concerns for possible respiratory vs urinary infection.   -Urine cultures with klebsiella sensitive to rocephin.  -bc ngtd    -ceftriaxone for abx coverage. Doxy stopped as appears to be a urinary source    "

## 2023-09-26 NOTE — SUBJECTIVE & OBJECTIVE
Interval History: Pt states he is feeling better, but still has a lot more swelling than at normal. Pt denies cp, sob, fever, chills.     Review of Systems  Objective:     Vital Signs (Most Recent):  Temp: 98.5 °F (36.9 °C) (09/26/23 1151)  Pulse: 64 (09/26/23 1151)  Resp: 18 (09/26/23 1151)  BP: 129/65 (09/26/23 1151)  SpO2: 98 % (09/26/23 1151) Vital Signs (24h Range):  Temp:  [97.6 °F (36.4 °C)-99.5 °F (37.5 °C)] 98.5 °F (36.9 °C)  Pulse:  [59-64] 64  Resp:  [17-20] 18  SpO2:  [95 %-99 %] 98 %  BP: (122-140)/(58-65) 129/65     Weight: 95.2 kg (209 lb 14.1 oz)  Body mass index is 29.27 kg/m².    Intake/Output Summary (Last 24 hours) at 9/26/2023 1401  Last data filed at 9/26/2023 1151  Gross per 24 hour   Intake 480 ml   Output 1280 ml   Net -800 ml         Physical Exam  Vitals reviewed.   Constitutional:       General: He is not in acute distress (jackson in place draining yellow urine.).     Appearance: He is ill-appearing (chronic).   HENT:      Head: Normocephalic and atraumatic.   Eyes:      General: No scleral icterus.     Extraocular Movements: Extraocular movements intact.   Cardiovascular:      Rate and Rhythm: Rhythm irregular.   Pulmonary:      Effort: Pulmonary effort is normal. No respiratory distress.   Abdominal:      General: There is no distension.      Palpations: Abdomen is soft.      Tenderness: There is no abdominal tenderness. There is no guarding or rebound.   Musculoskeletal:         General: Swelling (pitting edema up to the knee) present.      Cervical back: Neck supple. No rigidity.   Skin:     General: Skin is warm and dry.   Neurological:      General: No focal deficit present.      Mental Status: He is alert and oriented to person, place, and time.             Significant Labs: All pertinent labs within the past 24 hours have been reviewed.    Significant Imaging: I have reviewed all pertinent imaging results/findings within the past 24 hours.

## 2023-09-26 NOTE — CONSULTS
[facility] - Telemetry  Wound Care    Patient Name:  Chato Bowie   MRN:  [id number]  Date: 9/26/2023  Diagnosis: Sepsis    History:     Past Medical History:   Diagnosis Date    Acute congestive heart failure 3/20/2020    Alcohol abuse     Arthritis     Asthma attack 11/24/2021    Coronary artery disease     Diabetes mellitus     Hyperlipemia     Hypertension     Multiple thyroid nodules 6/14/2023    Rhabdomyolysis        Social History     Socioeconomic History    Marital status: Single   Tobacco Use    Smoking status: Never    Smokeless tobacco: Never   Substance and Sexual Activity    Alcohol use: Not Currently     Alcohol/week: 6.0 standard drinks of alcohol     Types: 6 Cans of beer per week     Comment: daily    Drug use: No    Sexual activity: Yes     Partners: Female     Social Determinants of Health     Financial Resource Strain: Medium Risk (6/15/2023)    Overall Financial Resource Strain (CARDIA)     Difficulty of Paying Living Expenses: Somewhat hard   Food Insecurity: No Food Insecurity (6/15/2023)    Hunger Vital Sign     Worried About Running Out of Food in the Last Year: Never true     Ran Out of Food in the Last Year: Never true   Transportation Needs: No Transportation Needs (6/15/2023)    PRAPARE - Transportation     Lack of Transportation (Medical): No     Lack of Transportation (Non-Medical): No   Physical Activity: Inactive (6/15/2023)    Exercise Vital Sign     Days of Exercise per Week: 0 days     Minutes of Exercise per Session: 0 min   Stress: Stress Concern Present (6/15/2023)    [employer] - Occupational Stress Questionnaire     Feeling of Stress : To some extent   Social Connections: Socially Isolated (6/15/2023)    Social Connection and Isolation Panel [NHANES]     Frequency of Communication with Friends and Family: Once a week     Frequency of Social Gatherings with Friends and Family: Once a week     Attends Judaism Services: Never     Active Member of  Clubs or Organizations: No     Attends Club or Organization Meetings: Never     Marital Status: Never    Housing Stability: Low Risk  (6/15/2023)    Housing Stability Vital Sign     Unable to Pay for Housing in the Last Year: No     Number of Places Lived in the Last Year: 1     Unstable Housing in the Last Year: No       Precautions:     Allergies as of 09/23/2023    (No Known Allergies)       WOC Assessment Details/Treatment   Consulted per WOGs for altered skin integrity buttock- WOG for DTI  A 72 year old male readmitted to [facility] 9/23/23 from [facility] with sepsis; advance care planning; UTI; CKD Stage 4; paroxysmal atrial flutter; BPH; hypokalemia; anemia; DM II with kidney complication  Patient recently admitted 9/19-9/23/23 at [facility]  Seen by [facility] nurse 9/20-   Incontinent; bed mobility with assistance; left plantar heel- DTI with mushy area and dark roof; right dorsal foot- healed pink scar; right heel/lateral foot- dry peeling skin without pressure injury; sacrum/gluteal cleft-pink scar/healed; right lateral back/waist level- healing wound possibly from garment or back of sitting surface  9/26 WBC 8.33 Hgb 7.3 Hct 24.1   9/23 Alb 2.3 Weight 209 lbs   9/20 A1C 5.5   On Isoflex mattress; Magdiel score 15; bed mobility with assistance; incontinent; wearing EHOB boots; using Versette to contain urinary incontinence  Assessment:  Photodocumentation    Right buttock- Epidermis peeling from scar on right buttock; previously healed wound. Pink base of 1 cm partial thickness wound. Probably moisture associated skin damage.  Perirectal skin- Moisture associated skin damage from fecal incontinence    Right upper buttock- Healing wound probably pressure injury from garment or support surface. Opening 5 mm(L) 1 cm(W) 3 mm(D) draining a copious amount serosanguineous drainage, Stage 3. Skin on right buttock appears macerated/edematous.   Nursing students recently changed dressing to left foot eschar - unstageable  pressure injury and reports dry skin remains on left foot.   Treatment:  Local wound care to right buttock (upper and lower buttock) wounds with hydrocolloid dressing.   Continue Pressure Injury Prevention Interventions with moisture management.  Recommendations made to primary team. Orders placed. Will continue treatment plan as per 9/20.     09/26/2023

## 2023-09-26 NOTE — NURSING
[facility] [place], [place]  Nurses Note -- 4 Eyes      9/25/2023       Skin assessed on: Q Shift      [] No Pressure Injuries Present    []Prevention Measures Documented    [x] Yes LDA  for Pressure Injury Previously documented     [] Yes New Pressure Injury Discovered   [] LDA for New Pressure Injury Added      Attending RN:  Viki Barbosa LPN     Second RN: JOYCE BEATTY

## 2023-09-26 NOTE — ASSESSMENT & PLAN NOTE
Pt with slight downtrend to below 7 this morning. Repeat pending to confirm. Pt appears to be baseline 7-8 hgb. No active bleeding at this time.    -stable

## 2023-09-26 NOTE — TELEPHONE ENCOUNTER
AM TC to pt to attempt to schedule appt  Phone not accepting calls at thsi time    PM TC to pt Phone not accepting calls at this time

## 2023-09-26 NOTE — NURSING
[facility], [facility]  Nurses Note -- 4 Eyes      9/26/2023       Skin assessed on: Q Shift      [] No Pressure Injuries Present    []Prevention Measures Documented    [x] Yes LDA  for Pressure Injury Previously documented   Bilat heels DTI, black, discoloration, Foams in place. Back Wound. Foam in place. Penis discoloration, Tenderness. De Leon catheter in place.    [] Yes New Pressure Injury Discovered   [] LDA for New Pressure Injury Added      Attending RN:  Kristel Yuen LPN     Second RN:  Viki JORGENSEN

## 2023-09-26 NOTE — ASSESSMENT & PLAN NOTE
"Patient is identified as having Diastolic (HFpEF) heart failure that is Acute on chronic. CHF is currently uncontrolled due to Continued edema of extremities. Latest ECHO performed and demonstrates- Results for orders placed during the hospital encounter of 09/19/23    Echo    Interpretation Summary    Left Ventricle: The left ventricle is normal in size. Normal wall thickness. Normal wall motion. There is normal systolic function with a visually estimated ejection fraction of 55 - 60%. Grade III diastolic dysfunction.    Left Atrium: Left atrium is severely dilated.    Right Ventricle: Moderate right ventricular enlargement. Systolic function is normal.    Right Atrium: Right atrium is severely dilated.    Aortic Valve: There is severe stenosis. Aortic valve area by VTI is 1.00 cm². Aortic valve peak velocity is 4.21 m/s. Mean gradient is 44 mmHg. The dimensionless index is 0.27. There is moderate aortic regurgitation.    Mitral Valve: There is moderate regurgitation.    Tricuspid Valve: There is moderate regurgitation.    Pulmonary Artery: The estimated pulmonary artery systolic pressure is 80 mmHg.    IVC/SVC: Elevated venous pressure at 15 mmHg.    1 cm x 1.4 cm mass seen in some views in [place] near the MV.. recommend MARIA if clinically indicated.  . Continue Beta Blocker and monitor clinical status closely. Monitor on telemetry. Patient is on CHF pathway.  Monitor strict Is&Os and daily weights.  Place on fluid restriction of 1.5 L. Continue to stress to patient importance of self efficacy and  on diet for CHF. Last BNP reviewed- and noted below   No results for input(s): "BNP", "BNPTRIAGEBLO" in the last 168 hours.-signficant improvement in bilateral LE swelling on exam. Currently on bumex with good diuresis. May be able to switch to po in the next day.  "

## 2023-09-26 NOTE — ASSESSMENT & PLAN NOTE
Patient's FSGs are controlled on current medication regimen.  Last A1c reviewed-   Lab Results   Component Value Date    HGBA1C 5.5 09/20/2023     Most recent fingerstick glucose reviewed-   Recent Labs   Lab 09/26/23  0812 09/26/23  1153   POCTGLUCOSE 130* 195*     Current correctional scale  Low   anti-hyperglycemic dose as follows-   Antihyperglycemics (From admission, onward)    Start     Stop Route Frequency Ordered    09/23/23 1251  insulin aspart U-100 pen 0-5 Units         -- SubQ Before meals & nightly PRN 09/23/23 1152        Hold Oral hypoglycemics while patient is in the hospital.

## 2023-09-27 ENCOUNTER — PATIENT OUTREACH (OUTPATIENT)
Dept: ADMINISTRATIVE | Facility: CLINIC | Age: 73
End: 2023-09-27
Payer: MEDICARE

## 2023-09-27 VITALS
DIASTOLIC BLOOD PRESSURE: 68 MMHG | SYSTOLIC BLOOD PRESSURE: 147 MMHG | OXYGEN SATURATION: 98 % | HEART RATE: 60 BPM | RESPIRATION RATE: 17 BRPM | TEMPERATURE: 98 F | HEIGHT: 71 IN | BODY MASS INDEX: 29.38 KG/M2 | WEIGHT: 209.88 LBS

## 2023-09-27 LAB
ANION GAP SERPL CALC-SCNC: 10 MMOL/L (ref 8–16)
BACTERIA BLD CULT: NORMAL
BACTERIA BLD CULT: NORMAL
BUN SERPL-MCNC: 45 MG/DL (ref 8–23)
CALCIUM SERPL-MCNC: 8.8 MG/DL (ref 8.7–10.5)
CHLORIDE SERPL-SCNC: 101 MMOL/L (ref 95–110)
CO2 SERPL-SCNC: 27 MMOL/L (ref 23–29)
CREAT SERPL-MCNC: 2.1 MG/DL (ref 0.5–1.4)
EST. GFR  (NO RACE VARIABLE): 33 ML/MIN/1.73 M^2
GLUCOSE SERPL-MCNC: 101 MG/DL (ref 70–110)
POCT GLUCOSE: 109 MG/DL (ref 70–110)
POCT GLUCOSE: 113 MG/DL (ref 70–110)
POCT GLUCOSE: 120 MG/DL (ref 70–110)
POCT GLUCOSE: 132 MG/DL (ref 70–110)
POTASSIUM SERPL-SCNC: 3.3 MMOL/L (ref 3.5–5.1)
SODIUM SERPL-SCNC: 138 MMOL/L (ref 136–145)

## 2023-09-27 PROCEDURE — 80048 BASIC METABOLIC PNL TOTAL CA: CPT | Performed by: HOSPITALIST

## 2023-09-27 PROCEDURE — 25000003 PHARM REV CODE 250: Performed by: EMERGENCY MEDICINE

## 2023-09-27 PROCEDURE — A4216 STERILE WATER/SALINE, 10 ML: HCPCS | Performed by: EMERGENCY MEDICINE

## 2023-09-27 PROCEDURE — 25000003 PHARM REV CODE 250: Performed by: HOSPITALIST

## 2023-09-27 PROCEDURE — 25000003 PHARM REV CODE 250: Performed by: STUDENT IN AN ORGANIZED HEALTH CARE EDUCATION/TRAINING PROGRAM

## 2023-09-27 PROCEDURE — 63600175 PHARM REV CODE 636 W HCPCS: Performed by: STUDENT IN AN ORGANIZED HEALTH CARE EDUCATION/TRAINING PROGRAM

## 2023-09-27 PROCEDURE — 36415 COLL VENOUS BLD VENIPUNCTURE: CPT | Performed by: HOSPITALIST

## 2023-09-27 RX ORDER — METOLAZONE 5 MG/1
5 TABLET ORAL DAILY
Status: ON HOLD
Start: 2023-09-27 | End: 2024-01-19 | Stop reason: HOSPADM

## 2023-09-27 RX ORDER — POTASSIUM CHLORIDE 20 MEQ/1
40 TABLET, EXTENDED RELEASE ORAL ONCE
Status: COMPLETED | OUTPATIENT
Start: 2023-09-27 | End: 2023-09-27

## 2023-09-27 RX ORDER — AMOXICILLIN 250 MG
2 CAPSULE ORAL 2 TIMES DAILY
Status: DISCONTINUED | OUTPATIENT
Start: 2023-09-27 | End: 2023-09-27 | Stop reason: HOSPADM

## 2023-09-27 RX ORDER — AMOXICILLIN 250 MG
2 CAPSULE ORAL 2 TIMES DAILY PRN
Start: 2023-09-27

## 2023-09-27 RX ORDER — CIPROFLOXACIN 500 MG/1
500 TABLET ORAL 2 TIMES DAILY
Qty: 4 TABLET | Refills: 0 | Status: SHIPPED | OUTPATIENT
Start: 2023-09-28 | End: 2023-09-30

## 2023-09-27 RX ORDER — POTASSIUM CHLORIDE 1500 MG/1
20 TABLET, EXTENDED RELEASE ORAL DAILY
Start: 2023-09-27 | End: 2023-11-27 | Stop reason: CLARIF

## 2023-09-27 RX ORDER — MUPIROCIN 20 MG/G
OINTMENT TOPICAL 2 TIMES DAILY
Status: DISCONTINUED | OUTPATIENT
Start: 2023-09-27 | End: 2023-09-27 | Stop reason: HOSPADM

## 2023-09-27 RX ADMIN — Medication 10 ML: at 06:09

## 2023-09-27 RX ADMIN — ATORVASTATIN CALCIUM 20 MG: 10 TABLET, FILM COATED ORAL at 09:09

## 2023-09-27 RX ADMIN — Medication 10 ML: at 11:09

## 2023-09-27 RX ADMIN — MUPIROCIN: 20 OINTMENT TOPICAL at 09:09

## 2023-09-27 RX ADMIN — CEFTRIAXONE 2 G: 2 INJECTION, POWDER, FOR SOLUTION INTRAMUSCULAR; INTRAVENOUS at 01:09

## 2023-09-27 RX ADMIN — ALLOPURINOL 100 MG: 100 TABLET ORAL at 09:09

## 2023-09-27 RX ADMIN — AMIODARONE HYDROCHLORIDE 200 MG: 200 TABLET ORAL at 09:09

## 2023-09-27 RX ADMIN — APIXABAN 5 MG: 5 TABLET, FILM COATED ORAL at 09:09

## 2023-09-27 RX ADMIN — METOPROLOL SUCCINATE 25 MG: 25 TABLET, EXTENDED RELEASE ORAL at 09:09

## 2023-09-27 RX ADMIN — SENNOSIDES AND DOCUSATE SODIUM 2 TABLET: 50; 8.6 TABLET ORAL at 09:09

## 2023-09-27 RX ADMIN — Medication 1 ENEMA: at 01:09

## 2023-09-27 RX ADMIN — Medication 10 ML: at 12:09

## 2023-09-27 RX ADMIN — POTASSIUM CHLORIDE 40 MEQ: 1500 TABLET, EXTENDED RELEASE ORAL at 11:09

## 2023-09-27 RX ADMIN — TAMSULOSIN HYDROCHLORIDE 0.4 MG: 0.4 CAPSULE ORAL at 09:09

## 2023-09-27 RX ADMIN — OXYCODONE AND ACETAMINOPHEN 1 TABLET: 10; 325 TABLET ORAL at 11:09

## 2023-09-27 NOTE — PLAN OF CARE
[facility]            NURSING HOME ORDERS    09/27/2023    Admit to Nursing Home:  Regular Bed       Diagnoses:  Active Hospital Problems    Diagnosis  POA    *Sepsis [A41.9]  Yes    Urinary retention [R33.9]  Yes    ACP (advance care planning) [Z71.89]  Not Applicable    UTI (urinary tract infection) [N39.0]  Yes    Acute on chronic diastolic (congestive) heart failure [I50.33]  Yes    CKD (chronic kidney disease), stage IV [N18.4]  Yes    Paroxysmal atrial flutter [I48.92]  Yes     Chronic    BPH (benign prostatic hyperplasia) [N40.0]  Yes    Severe aortic valve stenosis [I35.0]  Yes    Hypokalemia [E87.6]  Yes    Anemia [D64.9]  Yes    Type 2 diabetes mellitus with kidney complication, with long-term current use of insulin [E11.29, Z79.4]  Not Applicable     Chronic      Resolved Hospital Problems   No resolved problems to display.       Patient is homebound due to:  Sepsis    Allergies:Review of patient's allergies indicates:  No Known Allergies    Vitals:  Daily     Diet: cardiac diet     Acitivities:     - Up in a chair each morning as tolerated   - Ambulate with assistance to bathroom   - Scheduled walks once each shift (every 8 hours)   - May ambulate independently   - May use walker, cane, or self-propelled wheelchair    LABS:  Per facility protocol   CMP, CBC each month for 3 months    Nursing Precautions:    - Aspiration precautions:             - Total assistance with meals            -  Upright 90 degrees befor during and after meals             -  Suction at bedside          - Fall precautions per nursing home protocol   - Seizure precaution per [facility] protocol   - Decubitus precautions:        -  for positioning   - Pressure reducing foam mattress   - Turn patient every two hours. Use wedge pillows to anchor patient      MISCELLANEOUS CARE:     De Leon Care: Empty De Leon bag every shift.  Change De Leon every month     Routine Skin for Bedridden Patients:  Apply moisture barrier  cream to all    skin folds and wet areas in perineal area daily and after baths and                           all bowel movements.    Local wound care to unstageable left heel pressure injury every 2 days and prn- Cleanse with NS. Dress with Mepilex foam. Suspend heels off bed  with EHOB boots.    Local wound care to right lateral upper buttock wound every 2 days and prn- Cleanse with NS. Fill with Aquacel hydrofiber and cover with Duoderm hydrocolloid.    Local wound care to right lower buttock every 2 days and prn- Cleanse with NS. Apply 3M Cavilon No Sting Film Barrier to periwound skin. Cover with Duoderm hydrocolloid.            DIABETES CARE:        Check blood sugar:     Fingerstick blood sugar a.m and p.m.         Report CBG < 60 or > 400 to physician.                                          Insulin Sliding Scale          Glucose  Novolog Insulin Subcutaneous        0 - 60   Orange juice or glucose tablet, hold insulin      No insulin   201-250  2 units   251-300  4 units   301-350  6 units   351-400  8 units   >400   10 units then call physician      Medications: Discontinue all previous medication orders, if any. See new list below.       Medication List        START taking these medications      ciprofloxacin HCl 500 MG tablet  Commonly known as: CIPRO  Take 1 tablet (500 mg total) by mouth 2 (two) times daily. for 2 days  Start taking on: September 28, 2023     senna-docusate 8.6-50 mg 8.6-50 mg per tablet  Commonly known as: PERICOLACE  Take 2 tablets by mouth 2 (two) times daily as needed for Constipation.            CHANGE how you take these medications      metOLazone 5 MG tablet  Commonly known as: ZAROXOLYN  Take 1 tablet (5 mg total) by mouth once daily.  What changed:   how much to take  when to take this     potassium chloride 20 mEq  Commonly known as: K-TAB  Take 1 tablet (20 mEq total) by mouth once daily.  What changed:   medication strength  how much to take            CONTINUE  "taking these medications      albuterol 90 mcg/actuation inhaler  Commonly known as: PROVENTIL/VENTOLIN HFA  Inhale 2 puffs into the lungs every 6 (six) hours as needed for Wheezing or Shortness of Breath.     allopurinoL 100 MG tablet  Commonly known as: ZYLOPRIM  Take 1 tablet (100 mg total) by mouth once daily.     amiodarone 200 MG Tab  Commonly known as: PACERONE  Take 200 mg by mouth once daily.     ELIQUIS 5 mg Tab  Generic drug: apixaban  Take 5 mg by mouth 2 (two) times daily.     ferrous sulfate 325 mg (65 mg iron) Tab tablet  Commonly known as: FEOSOL  Take 325 mg by mouth once daily.     furosemide 80 MG tablet  Commonly known as: LASIX  Take 1 tablet (80 mg total) by mouth 2 (two) times daily.     gabapentin 100 MG capsule  Commonly known as: NEURONTIN  Take 1 capsule (100 mg total) by mouth 2 (two) times daily.     metoprolol succinate 25 MG 24 hr tablet  Commonly known as: TOPROL-XL  Take 25 mg by mouth once daily.     ONE DAILY MULTIVITAMIN per tablet  Generic drug: multivitamin  Take 1 tablet by mouth once daily.     rosuvastatin 40 MG Tab  Commonly known as: CRESTOR  Take 40 mg by mouth every evening.     tamsulosin 0.4 mg Cap  Commonly known as: FLOMAX  Take 1 capsule (0.4 mg total) by mouth once daily.            STOP taking these medications      BD AUTOSHIELD DUO PEN NEEDLE 30 gauge x 3/16" Ndle  Generic drug: pen needle,diabetic dual safty     CALAMINE PLUS (PRAMOX-CALAMIN) 1-8 % Spra  Generic drug: pramoxine-calamine     folic acid 1 MG tablet  Commonly known as: FOLVITE     hydrALAZINE 50 MG tablet  Commonly known as: APRESOLINE     HYDROcodone-acetaminophen 5-325 mg per tablet  Commonly known as: NORCO     insulin detemir U-100 (Levemir) 100 unit/mL (3 mL) Inpn pen     LIDOcaine 5 %  Commonly known as: LIDODERM     losartan-hydrochlorothiazide 50-12.5 mg 50-12.5 mg per tablet  Commonly known as: HYZAAR     VASHE WOUND THERAPY 0.033 % Irsl  Generic drug: sodium chlor-hypochlorous acid   "   VITAMIN C 500 MG tablet  Generic drug: ascorbic acid (vitamin C)                    _________________________________  Wen Polk MD  09/27/2023

## 2023-09-27 NOTE — NURSING
[facility] [place]  Nurses Note -- 4 Eyes      9/26/2023       Skin assessed on: Q Shift      [] No Pressure Injuries Present    []Prevention Measures Documented    [x] Yes LDA  for Pressure Injury Previously documented     [] Yes New Pressure Injury Discovered   [] LDA for New Pressure Injury Added      Attending RN:  Abby Perrin RN     Second RN:  Kristel Tolbert LPN

## 2023-09-27 NOTE — DISCHARGE SUMMARY
[facility] Medicine  Discharge Summary      Patient Name: Chato Bowie  MRN: [id number]  [facility]: [id number]  Patient Class: IP- Inpatient  Admission Date: 9/23/2023  Hospital Length of Stay: 1 days  Discharge Date and Time:  09/27/2023 10:53 AM  Attending Physician: Wen Polk MD   Discharging Provider: Wen Polk MD  [facility]    Primary Care Team: Networked reference to record PCT     HPI:   72M with pmh of hfref, CAD, DM, HTN, HLD, a fib who presents back due to fever and leukocytosis. Pt discharge order was placed the previous day 9/22, but did not leave until 5:15 on 9/23 prior to shift change to go back to his nursing facility. On review of the pts overnight vitals and labs pt noted to have a fever and new leukocytosis. I canceled pts discharge and alerted nurse of change of plan, but was informed pt had already left the facility. I called pts nursing home ([facility]) and spoke to a nurse. I informed them of his white count and fever overnight and advised that pt should be brought back to the emergency room for re-evaluation if there was not a provider in house. Nursing staff endorsed understanding and informed me they would start the process to have pt re-evaluated. Currently pt states he is feeling ok, but does endorse burning with urination. Denies hematuria, abd pain, n/v/d, cp, ha, blurry vision, sob, cough at this time.      * No surgery found *      Hospital Course:   72M with pmh of hfref, CAD, DM, HTN, HLD, a fib who presents back due to fever and leukocytosis. Source of sepsis is Klebsiella UTI. Continued antibiotics. Fever resolved. Changed to PO cipro to complete 7 days treatment. Also noted urinary retention and failed voiding trial on 9/23 and again on 9/26. Urinary retention contributes to UTI risk.  Continued tamsulosin, increased bowel regimen. Plan continue jackson x1 week and follow up with Urology on discharge.  Stable for discharge back to nursing home.        Goals of Care Treatment Preferences:  Code Status: Full Code    Health care agent: Leigh Evans  Health care agent number: [phone]          What is most important right now is to focus on avoiding the hospital, remaining as independent as possible, symptom/pain control, extending life as long as possible, even it it means sacrificing quality.  Accordingly, we have decided that the best plan to meet the patient's goals includes continuing with treatment.      Consults:   Consults (From admission, onward)        Status Ordering Provider     Inpatient consult to Registered Dietitian/Nutritionist  Once        Provider:  (Not yet assigned)    Completed BIRDIE EVANS III     Inpatient consult to Social Work/Case Management  Once        Provider:  (Not yet assigned)    Completed BIRDIE EVANS III     Inpatient consult to Registered Dietitian/Nutritionist  Once        Provider:  (Not yet assigned)    Acknowledged BIRDIE EVANS III     IP consult to case management  Once        Provider:  (Not yet assigned)    Completed BIRDIE EVANS III     Inpatient consult to Urology  Once        Provider:  (Not yet assigned)    Completed BIRDIE EVANS III     Inpatient consult to PICC team ([facility])  Once        Provider:  (Not yet assigned)    Completed BLAS PATHAK JR          No new Assessment & Plan notes have been filed under this hospital service since the last note was generated.  Service: Hospital Medicine    Final Active Diagnoses:    Diagnosis Date Noted POA    PRINCIPAL PROBLEM:  Sepsis [A41.9] 03/07/2016 Yes    Urinary retention [R33.9] 09/24/2023 Yes    ACP (advance care planning) [Z71.89] 09/21/2023 Not Applicable    UTI (urinary tract infection) [N39.0] 09/05/2021 Yes    Acute on chronic diastolic (congestive) heart failure [I50.33] 09/05/2021 Yes    CKD (chronic kidney disease), stage IV [N18.4] 03/23/2021 Yes    Paroxysmal atrial flutter  [I48.92] 12/07/2020 Yes     Chronic    BPH (benign prostatic hyperplasia) [N40.0] 11/24/2020 Yes    Severe aortic valve stenosis [I35.0] 11/24/2020 Yes    Hypokalemia [E87.6] 02/14/2019 Yes    Anemia [D64.9] 03/10/2017 Yes    Type 2 diabetes mellitus with kidney complication, with long-term current use of insulin [E11.29, Z79.4] 03/10/2017 Not Applicable     Chronic      Problems Resolved During this Admission:       Discharged Condition: stable    Disposition: long-term Nursing Home    Follow Up: with Urology     Patient Instructions:      Ambulatory referral/consult to Hematology / Oncology   Standing Status: Future   Referral Priority: Routine Referral Type: Consultation   Referral Reason: Specialty Services Required   Requested Specialty: Hematology and Oncology   Number of Visits Requested: 1     Diet Cardiac     Notify your health care provider if you experience any of the following:  increased confusion or weakness     Notify your health care provider if you experience any of the following:  persistent dizziness, light-headedness, or visual disturbances     Notify your health care provider if you experience any of the following:  worsening rash     Notify your health care provider if you experience any of the following:  severe persistent headache     Notify your health care provider if you experience any of the following:  difficulty breathing or increased cough     Notify your health care provider if you experience any of the following:  redness, tenderness, or signs of infection (pain, swelling, redness, odor or green/yellow discharge around incision site)     Notify your health care provider if you experience any of the following:  severe uncontrolled pain     Notify your health care provider if you experience any of the following:  persistent nausea and vomiting or diarrhea     Notify your health care provider if you experience any of the following:  temperature >100.4     Activity as tolerated        Significant Diagnostic Studies: N/A    Pending Diagnostic Studies:     None         Medications:  Reconciled Home Medications:      Medication List      START taking these medications    ciprofloxacin HCl 500 MG tablet  Commonly known as: CIPRO  Take 1 tablet (500 mg total) by mouth 2 (two) times daily. for 2 days  Start taking on: September 28, 2023     senna-docusate 8.6-50 mg 8.6-50 mg per tablet  Commonly known as: PERICOLACE  Take 2 tablets by mouth 2 (two) times daily as needed for Constipation.        CHANGE how you take these medications    metOLazone 5 MG tablet  Commonly known as: ZAROXOLYN  Take 1 tablet (5 mg total) by mouth once daily.  What changed:   · how much to take  · when to take this     potassium chloride 20 mEq  Commonly known as: K-TAB  Take 1 tablet (20 mEq total) by mouth once daily.  What changed:   · medication strength  · how much to take        CONTINUE taking these medications    albuterol 90 mcg/actuation inhaler  Commonly known as: PROVENTIL/VENTOLIN HFA  Inhale 2 puffs into the lungs every 6 (six) hours as needed for Wheezing or Shortness of Breath.     allopurinoL 100 MG tablet  Commonly known as: ZYLOPRIM  Take 1 tablet (100 mg total) by mouth once daily.     amiodarone 200 MG Tab  Commonly known as: PACERONE  Take 200 mg by mouth once daily.     ELIQUIS 5 mg Tab  Generic drug: apixaban  Take 5 mg by mouth 2 (two) times daily.     ferrous sulfate 325 mg (65 mg iron) Tab tablet  Commonly known as: FEOSOL  Take 325 mg by mouth once daily.     furosemide 80 MG tablet  Commonly known as: LASIX  Take 1 tablet (80 mg total) by mouth 2 (two) times daily.     gabapentin 100 MG capsule  Commonly known as: NEURONTIN  Take 1 capsule (100 mg total) by mouth 2 (two) times daily.     metoprolol succinate 25 MG 24 hr tablet  Commonly known as: TOPROL-XL  Take 25 mg by mouth once daily.     ONE DAILY MULTIVITAMIN per tablet  Generic drug: multivitamin  Take 1 tablet by mouth once daily.    "  rosuvastatin 40 MG Tab  Commonly known as: CRESTOR  Take 40 mg by mouth every evening.     tamsulosin 0.4 mg Cap  Commonly known as: FLOMAX  Take 1 capsule (0.4 mg total) by mouth once daily.        STOP taking these medications    BD AUTOSHIELD DUO PEN NEEDLE 30 gauge x 3/16" Ndle  Generic drug: pen needle,diabetic dual safty     CALAMINE PLUS (PRAMOX-CALAMIN) 1-8 % Spra  Generic drug: pramoxine-calamine     folic acid 1 MG tablet  Commonly known as: FOLVITE     hydrALAZINE 50 MG tablet  Commonly known as: APRESOLINE     HYDROcodone-acetaminophen 5-325 mg per tablet  Commonly known as: NORCO     insulin detemir U-100 (Levemir) 100 unit/mL (3 mL) Inpn pen     LIDOcaine 5 %  Commonly known as: LIDODERM     losartan-hydrochlorothiazide 50-12.5 mg 50-12.5 mg per tablet  Commonly known as: HYZAAR     VASHE WOUND THERAPY 0.033 % Irsl  Generic drug: sodium chlor-hypochlorous acid     VITAMIN C 500 MG tablet  Generic drug: ascorbic acid (vitamin C)            Indwelling Lines/Drains at time of discharge:   Lines/Drains/Airways     Drain  Duration                Urethral Catheter 09/26/23 1810 16 Fr. <1 day                Time spent on the discharge of patient: 35 minutes         Wen Polk MD  Department of Hospital Medicine  [facility] [place] - Telemetry  "

## 2023-09-27 NOTE — PLAN OF CARE
Follow-up Information       Amirah Schneider MD Follow up on 9/28/2023.    Specialty: Urology  Why: Hospital F/U with Uro at 2:40 PM.  Contact information:  [address]  [address]  [facility] [address] [phone]

## 2023-09-27 NOTE — PLAN OF CARE
LINCOLN notified nurse Sun that patient is ready for discharge from case management standpoint.     ADT 30 order placed for Van Transportation.  Requested  time:  If transportation does not arrive at ETA time nurse will be instructed to follow protocol for transportation below: 12:40 pm kike  How can I get in touch directly with dispatch, if needed?                 Non-emergent dispatch: [phone] option 6    +++NURSING:  If Van does not arrive at requested time please call the above Non Emergent Dispatcher.  If issue not resolved please escalate to your charge nurse for further instructions.    Call Report to [phone]. Patient is going to room 219.       09/27/23 1241   Final Note   Assessment Type Final Discharge Note   Anticipated Discharge Disposition longterm Nu   What phone number can be called within the next 1-3 days to see how you are doing after discharge? [phone]   Hospital Resources/Appts/Education Provided Appointments scheduled and added to AVS   Post-Acute Status   Post-Acute Authorization Other   Coverage [employer]   Other Status No Post-Acute Service Needs   Discharge Delays None known at this time

## 2023-09-27 NOTE — PROGRESS NOTES
Subjective:       Chato Bowie is a 72 y.o. male who is an established patient who was referred by PCP  for evaluation of ED.      Patient complains of erectile dysfunction. Onset of dysfunction was 4-5 months ago and was sudden in onset.  Patient states the nature of difficulty is both attaining and maintaining erection. Full erections occur never. Partial erections occur never. Libido is not affected. Risk factors for ED include +CAD, HTN, uncontrolled DM2, beta-blocker use. Previous treatment of ED includes Viagra 20mg - not helpful. He was not given a higher dose.     +isosorbide mononitrate listed on MAR but states he is no longer on this medication.      He also reports urinary frequency day and night. Occasional UUI. Tried prostate meds by report but stopped as they were not helpful. +Lasix. Significant glucosuria today.     Unknown if PSA checked by PCP.     PVR (bladder scan) today - 152cc    3/5/2020   Given Viagra 100mg last visit - no improvement. Feels that he has penile shortening.   Also given Flomax for LUTS - no improvement.   PVR (bladder scan) today - 188cc    9/27/2023  Last seen > 3 years ago. Seen by Dr Owens as inpatient consult this month for UR. De Leon placed. Restarted Flomax - on currently. +constipation - last BM yesterday after med given. +Klebsiella UTI. Here for voiding trial.     Void trial:  200cc instilled. No void into urinal, +wet pad. Catheter not replaced as pt refused.       The following portions of the patient's history were reviewed and updated as appropriate: allergies, current medications, past family history, past medical history, past social history, past surgical history and problem list.    Review of Systems  Constitutional: no fever or chills  ENT: no nasal congestion or sore throat  Respiratory: no cough or shortness of breath  Cardiovascular: no chest pain or palpitations  Gastrointestinal: no nausea or vomiting, tolerating diet  Genitourinary: as per  "HPI  Hematologic/Lymphatic: no easy bruising or lymphadenopathy  Musculoskeletal: no arthralgias or myalgias  Skin: no rashes or lesions  Neurological: no seizures or tremors  Behavioral/Psych: no auditory or visual hallucinations       Objective:    Vitals: There were no vitals taken for this visit.    Physical Exam   General: well developed, well nourished in no acute distress, in wheelchair. Unable to ambulate.  Head: normocephalic, atraumatic  Neck: supple, trachea midline, no obvious enlargement of thyroid  HEENT: EOMI, mucus membranes moist, sclera anicteric, no hearing impairment  Lungs: symmetric expansion, non-labored breathing  Skin: no rashes or lesions  Neuro: alert and oriented x 3, no gross deficits  Psych: normal judgment and insight, normal mood/affect and non-anxious  Genitourinary: Phimosis with jackson catheter trauma noted on prepuce. Difficult to see glans due to phimosis and pt's position upright in wheelchair.   LINCOLN:  deferred      Lab Review   Urine analysis today in clinic shows - jackson    Lab Results   Component Value Date    WBC 8.33 09/26/2023    HGB 7.3 (L) 09/26/2023    HCT 24.1 (L) 09/26/2023    HCT 35 (L) 07/17/2023    MCV 80 (L) 09/26/2023     09/26/2023     Lab Results   Component Value Date    CREATININE 2.1 (H) 09/27/2023    BUN 45 (H) 09/27/2023     No results found for: "PSA"  No results found for: "PSADIAG"    Imaging  CT 9/23         Assessment/Plan:      1. Erectile dysfunction due to arterial insufficiency    - Multiple risk factors   - Viagra 20mg given previously - too low of dose.    - Viagra 100mg. Instructed on use and SE.    - As he has failed PDE5i, we discussed other alternatives of ED treatment. We discussed KODAK with compression ring, MUSE intraurethral suppository, penile injection therapy as non-surgical options. We also discussed possibility of IPP implantation. He understands the pros and cons of each therapy including cost and risks.   - Interested in " KODAK.       2. Nocturia    - Multifactorial   - Needs much better glucose control   - Flomax - no improvement     3. Frequency of urination    - Flomax   - Glucose control   - Also on Lasix      4. Urge incontinence    - Flomax     5. Microhematuria   - UA micro today   - If confirmed, rec CT uro and cysto    6. Urinary retention   - Known COLIN from years ago   - Minimal void with VT today. Pt refused catheter replacement. He is in SNF and states RN there can place catheter if recurrent UR.    - Check PVR 2 weeks.   - May need cysto +/- UDS   - Cont Flomax   - Avoid constipation      Follow up in 2 weeks for PVR

## 2023-09-27 NOTE — PROGRESS NOTES
[facility] [place]es [place] -- 4 Eyes      9/27/2023       Skin assessed on: Q Shift      [] No Pressure Injuries Present    []Prevention Measures Documented    [x] Yes LDA  for Pressure Injury Previously documented     [] Yes New Pressure Injury Discovered   [] LDA for New Pressure Injury Added      Attending RN:  Sun Rosales RN     Second RN:  Abby Perrin RN

## 2023-09-27 NOTE — PROGRESS NOTES
Student nurses changing dressings to buttock. Reports fecal incontinence undermined hydrocolloid dressing.   Nursing reports yesterday pm, penis was edematous and painful with foreskin displaced when replacing indwelling catheter. Examined penis and penis no longer edematous and uncircumcised foreskin in place. Noted 5 mm moist, white ulceration on tip of foreskin. No longer painful.

## 2023-09-28 ENCOUNTER — OFFICE VISIT (OUTPATIENT)
Dept: UROLOGY | Facility: CLINIC | Age: 73
End: 2023-09-28
Payer: MEDICARE

## 2023-09-28 DIAGNOSIS — R35.1 NOCTURIA: ICD-10-CM

## 2023-09-28 DIAGNOSIS — R31.29 MICROHEMATURIA: ICD-10-CM

## 2023-09-28 DIAGNOSIS — R33.9 URINARY RETENTION: Primary | ICD-10-CM

## 2023-09-28 DIAGNOSIS — N52.01 ERECTILE DYSFUNCTION DUE TO ARTERIAL INSUFFICIENCY: ICD-10-CM

## 2023-09-28 PROCEDURE — 1160F RVW MEDS BY RX/DR IN RCRD: CPT | Mod: CPTII,S$GLB,, | Performed by: UROLOGY

## 2023-09-28 PROCEDURE — 3044F PR MOST RECENT HEMOGLOBIN A1C LEVEL <7.0%: ICD-10-PCS | Mod: CPTII,S$GLB,, | Performed by: UROLOGY

## 2023-09-28 PROCEDURE — 3044F HG A1C LEVEL LT 7.0%: CPT | Mod: CPTII,S$GLB,, | Performed by: UROLOGY

## 2023-09-28 PROCEDURE — 99999 PR PBB SHADOW E&M-EST. PATIENT-LVL II: CPT | Mod: PBBFAC,,, | Performed by: UROLOGY

## 2023-09-28 PROCEDURE — 1160F PR REVIEW ALL MEDS BY PRESCRIBER/CLIN PHARMACIST DOCUMENTED: ICD-10-PCS | Mod: CPTII,S$GLB,, | Performed by: UROLOGY

## 2023-09-28 PROCEDURE — 1159F PR MEDICATION LIST DOCUMENTED IN MEDICAL RECORD: ICD-10-PCS | Mod: CPTII,S$GLB,, | Performed by: UROLOGY

## 2023-09-28 PROCEDURE — 1111F PR DISCHARGE MEDS RECONCILED W/ CURRENT OUTPATIENT MED LIST: ICD-10-PCS | Mod: CPTII,S$GLB,, | Performed by: UROLOGY

## 2023-09-28 PROCEDURE — 99999 PR PBB SHADOW E&M-EST. PATIENT-LVL II: ICD-10-PCS | Mod: PBBFAC,,, | Performed by: UROLOGY

## 2023-09-28 PROCEDURE — 1111F DSCHRG MED/CURRENT MED MERGE: CPT | Mod: CPTII,S$GLB,, | Performed by: UROLOGY

## 2023-09-28 PROCEDURE — 99214 OFFICE O/P EST MOD 30 MIN: CPT | Mod: S$GLB,,, | Performed by: UROLOGY

## 2023-09-28 PROCEDURE — 99214 PR OFFICE/OUTPT VISIT, EST, LEVL IV, 30-39 MIN: ICD-10-PCS | Mod: S$GLB,,, | Performed by: UROLOGY

## 2023-09-28 PROCEDURE — 1159F MED LIST DOCD IN RCRD: CPT | Mod: CPTII,S$GLB,, | Performed by: UROLOGY

## 2023-09-29 ENCOUNTER — TELEPHONE (OUTPATIENT)
Dept: HEMATOLOGY/ONCOLOGY | Facility: CLINIC | Age: 73
End: 2023-09-29
Payer: MEDICARE

## 2023-09-29 NOTE — TELEPHONE ENCOUNTER
9-28-23  Tc to attempt to schedule appt   Not accepting calls     9-29-23  Tc to attempt to schedule appt   Not accepting calls     Letter mailed on this day

## 2023-10-03 NOTE — HOSPITAL COURSE
72M with pmh of hfref, CAD, DM, HTN, HLD, a fib who presented due to LE swelling and started on iv diuresis. Pt with good diuresis and plan was for pt to be d/c back to his facility. Pt discharge order was placed on 9/22, but did not leave until 5:15 on 9/23 prior to shift change to go back to his nursing facility. On review of the pts overnight vitals and labs pt noted to have a fever and new leukocytosis. I canceled pts discharge and alerted nurse of change of plan, but was informed pt had already left the facility. I called pts nursing home ([facility]) and spoke to a nurse. I informed them of his white count and fever overnight and advised that pt should be brought back to the emergency room for re-evaluation if there was not a provider in house.

## 2023-10-03 NOTE — DISCHARGE SUMMARY
[facility] Medicine  Discharge Summary      Patient Name: Chato Bowie  MRN: [id number]  RUPESH: [id number]  Patient Class: IP- Inpatient  Admission Date: 9/19/2023  Hospital Length of Stay: 2 days  Discharge Date and Time: 9/23/2023  5:15 AM  Attending Physician: No att. providers found   Discharging Provider: Jose L Evans III, MD  Primary Care Provider: Irlanda Valenzuela -    Primary Care Team: Networked reference to record PCT     HPI:   72-year-old  male with medical history significant for congestive heart failure, coronary artery disease, type 2 diabetes mellitus, chronic kidney disease stage 4, hypertension, hyperlipidemia, multiple thyroid nodule and alcohol use disorder in remission was transferred from [facility] ED after presenting with chest pain of 1 hour duration, chest pain was said to be insidious in onset, non-exertional, as patient just finished breakfast and was watching tv when he noticed chest pain across his chest, described as stabbing/someone pounding on my chest, chest pain was nonradiating, said to be 10/10 at onset now 9/10, denied diaphoresis, no nausea, no vomiting although associated with palpitation.  He denied any cough, no change in baseline orthopnea of 2 pillows, no paroxysmal nocturnal dyspnea, although has 2+ of bilateral pitting pedal edema he denied fever, chills, rigors, denied urinary symptoms of dysuria, frequency, urgency, straining at micturition or hematuria.  No change in bowel habits, denied diarrhea, and constipation.  No previous history of tobacco use, voiced quitting alcohol sometimes back.  At presention to the outside hospital lab was significant for hypokalemia of 3.1, BUN creatinine of 69/3.5, with a baseline of 2.1-2.5, H&H 7.4/23.5.  Troponin was 0.053-0.055-0.056 essentially flat similar to previous, be in [facility] in the [facility], most recent prior to this was 487 in July 27, 2023.  CT abdomen and pelvis without  "contrast showed "bilateral pleural effusion diffuse body wall edema, smooth interlobular septal thickening which may relate to vascular congestion/mild interstitial edema".Pain was mildly improved with aspirin and nitro SG.Had similar chest pain in the past as well.      Procedure(s) (LRB):  Transesophageal echo (MARIA) intra-procedure log documentation (N/A)  ECHOCARDIOGRAM, TRANSESOPHAGEAL (N/A)      Hospital Course:   72M with pmh of hfref, CAD, DM, HTN, HLD, a fib who presented due to LE swelling and started on iv diuresis. Pt with good diuresis and plan was for pt to be d/c back to his facility. Pt discharge order was placed on 9/22, but did not leave until 5:15 on 9/23 prior to shift change to go back to his nursing facility. On review of the pts overnight vitals and labs pt noted to have a fever and new leukocytosis. I canceled pts discharge and alerted nurse of change of plan, but was informed pt had already left the facility. I called pts nursing home ([facility]) and spoke to a nurse. I informed them of his white count and fever overnight and advised that pt should be brought back to the emergency room for re-evaluation if there was not a provider in house.       Goals of Care Treatment Preferences:  Code Status: Full Code    Health care agent: Leigh Evans  Health care agent number: [phone]          What is most important right now is to focus on avoiding the hospital, remaining as independent as possible, symptom/pain control, extending life as long as possible, even it it means sacrificing quality.  Accordingly, we have decided that the best plan to meet the patient's goals includes continuing with treatment.      Consults:   Consults (From admission, onward)        Status Ordering Provider     Inpatient consult to Spiritual Care  Once        Provider:  (Not yet assigned)    Completed KIN GARZON     Inpatient consult to Social Work/Case Management  Once        Provider:  (Not yet " assigned)    Completed BIRDIE YOUSSEF III     Inpatient consult to Registered Dietitian/Nutritionist  Once        Provider:  (Not yet assigned)    Completed BIRDIE YOUSSEF III     Inpatient consult to Cardiology  Once        Provider:  (Not yet assigned)    Completed BIRDIE YOUSSEF III     Inpatient consult to Palliative Care  Once        Provider:  (Not yet assigned)    Completed BIRDIE YOUSSEF III     Inpatient consult to Gastroenterology  Once        Provider:  (Not yet assigned)    Completed BIRDIE YOUSSEF III          Oncology  Anemia  likely anemia of chronic kidney disease  Currently has CKD stage 4, previous panel showed iron of 17, TIBC 207, saturation of 8, transferrin 140, ferritin 714  To continue supplementation.  No evidence of acute blood loss, but pt stating he was having dark colored stools at home  Continue to correct iron deficiency.    -hgb stable after 1 unit prbc  -GI consulted and following although acute bleed seems less likely at this time. Will discuss with GI timing to restart eliquis        Final Active Diagnoses:    Diagnosis Date Noted POA    PRINCIPAL PROBLEM:  Chest pain [R07.9] 12/05/2020 Yes    ACP (advance care planning) [Z71.89] 09/21/2023 Not Applicable    Left atrial mass [I51.89] 09/21/2023 Yes    Multiple wounds [T07.XXXA] 07/28/2023 Yes     Chronic    acute on chronic diastolic heart failure [I50.32] 05/12/2022 Yes    CKD (chronic kidney disease), stage IV [N18.4] 03/23/2021 Yes    Acute on chronic diastolic congestive heart failure [I50.33] 12/29/2020 Yes    Paroxysmal atrial flutter [I48.92] 12/07/2020 Yes     Chronic    BPH (benign prostatic hyperplasia) [N40.0] 11/24/2020 Yes    Severe aortic valve stenosis [I35.0] 11/24/2020 Yes    Hypokalemia [E87.6] 02/14/2019 Yes    Alcohol abuse [F10.10] 02/14/2019 Yes    Dyslipidemia [E78.5] 03/10/2017 Yes    Essential hypertension [I10] 03/10/2017 Yes    Acute kidney injury superimposed on CKD [N17.9,  N18.9] 03/10/2017 Yes    Type 2 diabetes mellitus with kidney complication, with long-term current use of insulin [E11.29, Z79.4] 03/10/2017 Not Applicable     Chronic    Anemia [D64.9] 03/10/2017 Yes      Problems Resolved During this Admission:    Diagnosis Date Noted Date Resolved POA    Type 2 diabetes mellitus with hyperglycemia [E11.65] 11/24/2020 09/20/2023 Yes     Chronic    Typical atrial flutter [I48.3] 03/07/2016 09/21/2023 Yes       Discharged Condition: fair    Disposition: Home or Self Care    Follow Up:   Follow-up Information     Irlanda Valenzuela - Follow up.    Contact information:  [address] [id number] [phone]                       Patient Instructions:      Ambulatory referral/consult to CLINIC Palliative Care   Standing Status: Future   Referral Priority: Routine Referral Type: Consultation   Requested Specialty: Palliative Medicine   Number of Visits Requested: 1     Ambulatory referral/consult to Cardiology   Standing Status: Future   Referral Priority: Routine Referral Type: Consultation   Referral Reason: Specialty Services Required   Requested Specialty: Cardiology   Number of Visits Requested: 1     Ambulatory referral/consult to Gastroenterology   Standing Status: Future   Referral Priority: Routine Referral Type: Consultation   Referral Reason: Specialty Services Required   Requested Specialty: Gastroenterology   Number of Visits Requested: 1     Ambulatory referral/consult to Hematology / Oncology   Standing Status: Future   Referral Priority: Routine Referral Type: Consultation   Referral Reason: Specialty Services Required   Requested Specialty: Hematology and Oncology   Number of Visits Requested: 1     Ambulatory referral/consult to Nephrology   Standing Status: Future   Referral Priority: Routine Referral Type: Consultation   Referral Reason: Specialty Services Required   Requested Specialty: Nephrology   Number of Visits Requested: 1       Significant  Diagnostic Studies: Labs: All labs within the past 24 hours have been reviewed    Pending Diagnostic Studies:     None         Medications:  Reconciled Home Medications:      Medication List      CONTINUE taking these medications    albuterol 90 mcg/actuation inhaler  Commonly known as: PROVENTIL/VENTOLIN HFA  Inhale 2 puffs into the lungs every 6 (six) hours as needed for Wheezing or Shortness of Breath.     allopurinoL 100 MG tablet  Commonly known as: ZYLOPRIM  Take 1 tablet (100 mg total) by mouth once daily.     amiodarone 200 MG Tab  Commonly known as: PACERONE  Take 200 mg by mouth once daily.     ELIQUIS 5 mg Tab  Generic drug: apixaban  Take 5 mg by mouth 2 (two) times daily.     ferrous sulfate 325 mg (65 mg iron) Tab tablet  Commonly known as: FEOSOL  Take 325 mg by mouth once daily.     furosemide 80 MG tablet  Commonly known as: LASIX  Take 1 tablet (80 mg total) by mouth 2 (two) times daily.     gabapentin 100 MG capsule  Commonly known as: NEURONTIN  Take 1 capsule (100 mg total) by mouth 2 (two) times daily.     metoprolol succinate 25 MG 24 hr tablet  Commonly known as: TOPROL-XL  Take 25 mg by mouth once daily.     ONE DAILY MULTIVITAMIN per tablet  Generic drug: multivitamin  Take 1 tablet by mouth once daily.     rosuvastatin 40 MG Tab  Commonly known as: CRESTOR  Take 40 mg by mouth every evening.     tamsulosin 0.4 mg Cap  Commonly known as: FLOMAX  Take 1 capsule (0.4 mg total) by mouth once daily.        STOP taking these medications    amLODIPine 10 MG tablet  Commonly known as: NORVASC     hydrALAZINE 100 MG tablet  Commonly known as: APRESOLINE            Indwelling Lines/Drains at time of discharge:   Lines/Drains/Airways     None                 Time spent on the discharge of patient: >35 minutes         Jose L Evans III, MD  Department of Hospital Medicine  [facility]

## 2023-10-12 ENCOUNTER — OFFICE VISIT (OUTPATIENT)
Dept: UROLOGY | Facility: CLINIC | Age: 73
End: 2023-10-12
Payer: MEDICARE

## 2023-10-12 DIAGNOSIS — R35.1 NOCTURIA: ICD-10-CM

## 2023-10-12 DIAGNOSIS — R31.29 MICROHEMATURIA: ICD-10-CM

## 2023-10-12 DIAGNOSIS — N52.01 ERECTILE DYSFUNCTION DUE TO ARTERIAL INSUFFICIENCY: ICD-10-CM

## 2023-10-12 DIAGNOSIS — R33.9 URINARY RETENTION: Primary | ICD-10-CM

## 2023-10-12 PROCEDURE — 1160F RVW MEDS BY RX/DR IN RCRD: CPT | Mod: CPTII,S$GLB,, | Performed by: UROLOGY

## 2023-10-12 PROCEDURE — 99999 PR PBB SHADOW E&M-EST. PATIENT-LVL III: CPT | Mod: PBBFAC,,, | Performed by: UROLOGY

## 2023-10-12 PROCEDURE — 99214 OFFICE O/P EST MOD 30 MIN: CPT | Mod: S$GLB,,, | Performed by: UROLOGY

## 2023-10-12 PROCEDURE — 3044F HG A1C LEVEL LT 7.0%: CPT | Mod: CPTII,S$GLB,, | Performed by: UROLOGY

## 2023-10-12 PROCEDURE — 1157F ADVNC CARE PLAN IN RCRD: CPT | Mod: CPTII,S$GLB,, | Performed by: UROLOGY

## 2023-10-12 PROCEDURE — 1160F PR REVIEW ALL MEDS BY PRESCRIBER/CLIN PHARMACIST DOCUMENTED: ICD-10-PCS | Mod: CPTII,S$GLB,, | Performed by: UROLOGY

## 2023-10-12 PROCEDURE — 3044F PR MOST RECENT HEMOGLOBIN A1C LEVEL <7.0%: ICD-10-PCS | Mod: CPTII,S$GLB,, | Performed by: UROLOGY

## 2023-10-12 PROCEDURE — 99999 PR PBB SHADOW E&M-EST. PATIENT-LVL III: ICD-10-PCS | Mod: PBBFAC,,, | Performed by: UROLOGY

## 2023-10-12 PROCEDURE — 1111F DSCHRG MED/CURRENT MED MERGE: CPT | Mod: CPTII,S$GLB,, | Performed by: UROLOGY

## 2023-10-12 PROCEDURE — 99214 PR OFFICE/OUTPT VISIT, EST, LEVL IV, 30-39 MIN: ICD-10-PCS | Mod: S$GLB,,, | Performed by: UROLOGY

## 2023-10-12 PROCEDURE — 1159F PR MEDICATION LIST DOCUMENTED IN MEDICAL RECORD: ICD-10-PCS | Mod: CPTII,S$GLB,, | Performed by: UROLOGY

## 2023-10-12 PROCEDURE — 1159F MED LIST DOCD IN RCRD: CPT | Mod: CPTII,S$GLB,, | Performed by: UROLOGY

## 2023-10-12 PROCEDURE — 1101F PT FALLS ASSESS-DOCD LE1/YR: CPT | Mod: CPTII,S$GLB,, | Performed by: UROLOGY

## 2023-10-12 PROCEDURE — 1157F PR ADVANCE CARE PLAN OR EQUIV PRESENT IN MEDICAL RECORD: ICD-10-PCS | Mod: CPTII,S$GLB,, | Performed by: UROLOGY

## 2023-10-12 PROCEDURE — 3288F FALL RISK ASSESSMENT DOCD: CPT | Mod: CPTII,S$GLB,, | Performed by: UROLOGY

## 2023-10-12 PROCEDURE — 1101F PR PT FALLS ASSESS DOC 0-1 FALLS W/OUT INJ PAST YR: ICD-10-PCS | Mod: CPTII,S$GLB,, | Performed by: UROLOGY

## 2023-10-12 PROCEDURE — 1111F PR DISCHARGE MEDS RECONCILED W/ CURRENT OUTPATIENT MED LIST: ICD-10-PCS | Mod: CPTII,S$GLB,, | Performed by: UROLOGY

## 2023-10-12 PROCEDURE — 3288F PR FALLS RISK ASSESSMENT DOCUMENTED: ICD-10-PCS | Mod: CPTII,S$GLB,, | Performed by: UROLOGY

## 2023-10-12 NOTE — PROGRESS NOTES
Subjective:       Chato Bowie is a 72 y.o. male who is an established patient who was referred by PCP  for evaluation of ED.      Patient complains of erectile dysfunction. Onset of dysfunction was 4-5 months ago and was sudden in onset.  Patient states the nature of difficulty is both attaining and maintaining erection. Full erections occur never. Partial erections occur never. Libido is not affected. Risk factors for ED include +CAD, HTN, uncontrolled DM2, beta-blocker use. Previous treatment of ED includes Viagra 20mg - not helpful. He was not given a higher dose.     +isosorbide mononitrate listed on MAR but states he is no longer on this medication.      He also reports urinary frequency day and night. Occasional UUI. Tried prostate meds by report but stopped as they were not helpful. +Lasix. Significant glucosuria today.     Unknown if PSA checked by PCP.     PVR (bladder scan) today - 152cc    3/5/2020   Given Viagra 100mg last visit - no improvement. Feels that he has penile shortening.   Also given Flomax for LUTS - no improvement.   PVR (bladder scan) today - 188cc    9/27/2023  Last seen > 3 years ago. Seen by Dr Owens as inpatient consult this month for UR. De Leon placed. Restarted Flomax - on currently. +constipation - last BM yesterday after med given. +Klebsiella UTI. Here for voiding trial.     Void trial:  200cc instilled. No void into urinal, +wet pad. Catheter not replaced as pt refused.     10/12/2023  Reports he is voiding without issues.   PVR (bladder scan) today - 187cc (voided 350cc prior to this in urinal)          The following portions of the patient's history were reviewed and updated as appropriate: allergies, current medications, past family history, past medical history, past social history, past surgical history and problem list.    Review of Systems  Constitutional: no fever or chills  ENT: no nasal congestion or sore throat  Respiratory: no cough or shortness of  "breath  Cardiovascular: no chest pain or palpitations  Gastrointestinal: no nausea or vomiting, tolerating diet  Genitourinary: as per HPI  Hematologic/Lymphatic: no easy bruising or lymphadenopathy  Musculoskeletal: no arthralgias or myalgias  Skin: no rashes or lesions  Neurological: no seizures or tremors  Behavioral/Psych: no auditory or visual hallucinations       Objective:    Vitals: There were no vitals taken for this visit.    Physical Exam   General: well developed, well nourished in no acute distress, in wheelchair. Unable to ambulate.  Head: normocephalic, atraumatic  Neck: supple, trachea midline, no obvious enlargement of thyroid  HEENT: EOMI, mucus membranes moist, sclera anicteric, no hearing impairment  Lungs: symmetric expansion, non-labored breathing  Skin: no rashes or lesions  Neuro: alert and oriented x 3, no gross deficits  Psych: normal judgment and insight, normal mood/affect and non-anxious  Genitourinary: deferred  LINCOLN:  deferred      Lab Review   Urine analysis today in clinic shows - pending    Lab Results   Component Value Date    WBC 8.33 09/26/2023    HGB 7.3 (L) 09/26/2023    HCT 24.1 (L) 09/26/2023    HCT 35 (L) 07/17/2023    MCV 80 (L) 09/26/2023     09/26/2023     Lab Results   Component Value Date    CREATININE 2.1 (H) 09/27/2023    BUN 45 (H) 09/27/2023     No results found for: "PSA"  No results found for: "PSADIAG"    Imaging  CT 9/23         Assessment/Plan:      1. Erectile dysfunction due to arterial insufficiency    - Multiple risk factors   - Viagra 20mg given previously - too low of dose.    - Viagra 100mg. Instructed on use and SE.    - As he has failed PDE5i, we discussed other alternatives of ED treatment. We discussed KODAK with compression ring, MUSE intraurethral suppository, penile injection therapy as non-surgical options. We also discussed possibility of IPP implantation. He understands the pros and cons of each therapy including cost and risks.   - " Interested in KODAK.       2. Nocturia    - Multifactorial   - Needs much better glucose control   - Flomax - no improvement     3. Frequency of urination    - Flomax   - Glucose control   - Also on Lasix      4. Urge incontinence    - Flomax     5. Microhematuria   - Recent UTI    6. Urinary retention   - Known COLIN from years ago   - Minimal void with VT. Pt refused catheter replacement. He is in SNF and states RN there can place catheter if recurrent UR.    - May need cysto +/- UDS   - Cont Flomax   - Avoid constipation   - Voiding is acceptable. PVR elevated but stable.    - Recheck PVR 2m      Follow up in 2 months for PVR

## 2023-10-13 NOTE — PHYSICIAN QUERY
PT Name: Chato Bowie  MR #: [id number]     DOCUMENTATION CLARIFICATION     CDS/: Mandi Sandhu RN CDIS             Contact information: [email or web address]    This form is a permanent document in the medical record.     Query Date: October 13, 2023    By submitting this query, we are merely seeking further clarification of documentation.  Please utilize your independent clinical judgment when addressing the question(s) below.  Provider, please provide the integumentary diagnosis related to the documentation of (Right lateral back/waist):   The Medical Record contains the following:    Wound care consult   Right lateral back/waist level- 9 mm healing wound; possibly injury from garment or back of sitting surface; states up in wheelchair at NH  Treatment:  Local wound care to right back wound and left plantar heel with Mepilex foam dressing. Continue      note 9/21   Multiple wounds  Noticed to have decubitus ulcers  With sacral 2 x 1 cm  Dressed, consult wound care at this time.  Frequent turning           The clinical guidelines noted are only a system guideline. It does not replace the providers clinical judgment.    Per the National Pressure Injury Advisory Panel:   A pressure injury is localized damage to the skin and underlying soft tissue usually over a bony prominence or related to a medical or other device. The injury can present as intact skin or an open ulcer and may be painful. The injury occurs as a result of intense and/or prolonged pressure or pressure in combination with shear. The tolerance of soft tissue for pressure and shear may also be affected by microclimate, nutrition, perfusion, co-morbidities and condition of the soft tissue.       Stage 1 Pressure Injury:  Intact skin with a localized area of non-blanchable erythema, which may appear differently in darkly pigmented skin. Color changes do not include purple or maroon discoloration; these may indicate deep tissue pressure  injury.    Stage 2 Pressure Injury:  Partial-thickness loss of skin with exposed dermis. The wound bed is viable, pink or red, moist, and may also present as an intact or ruptured serum-filled blister.    Stage 3 Pressure Injury:  Full-thickness loss of skin, in which adipose (fat) is visible in the ulcer and granulation tissue and epibole (rolled wound edges) are often present. Slough and/or eschar may be visible. Undermining and tunneling may occur.    Stage 4 Pressure Injury:  Full-thickness skin and tissue loss with exposed or directly palpable fascia, muscle, tendon, ligament, cartilage or bone in the ulcer. Slough and/or eschar may be visible. Epibole (rolled edges), undermining and/or tunneling often occur.    Unstageable Pressure Injury:  Full-thickness skin and tissue loss in which the extent of tissue damage within the ulcer cannot be confirmed because it is obscured by slough or eschar. If slough or eschar is removed, a Stage 3 or Stage 4 pressure injury will be revealed.    Deep Tissue Pressure Injury:  Intact or non-intact skin with localized area of persistent non-blanchable deep red, maroon, purple discoloration or epidermal separation revealing a dark wound bed or blood filled blister. This injury results from intense and/or prolonged pressure and shear forces at the bone-muscle interface. The wound may evolve rapidly to reveal the actual extent of tissue injury, or may resolve without tissue loss. If necrotic tissue, subcutaneous tissue, granulation tissue, fascia, muscle or other underlying structures are visible, this indicates a full thickness pressure injury (Unstageable, Stage 3 or Stage 4). Do not use DTPI to describe vascular, traumatic, neuropathic, or dermatologic conditions.       Provider, please provide the integumentary diagnosis related to the documentation of (Right lateral back/waist):   PLEASE SEND TO WOUND CARE  [   ] Pressure Injury/Decubitus Ulcer, Stage 1   [   ] Pressure  Injury/Decubitus Ulcer, Stage 2   [   ] Pressure Injury/Decubitus Ulcer, Stage 3   [   ] Pressure Injury/Decubitus Ulcer, Stage 4   [   ] Pressure Injury/Decubitus Ulcer, Unstageable   [   ] Non-pressure ulcer, skin breakdown only   [   ] Non-pressure ulcer, exposed fat layer   [   ] Non-pressure ulcer, other depth (please specify): ___________   [   ] Other Integumentary Diagnosis (please specify):______________       Please document in your progress notes daily for the duration of treatment until resolved and include in your discharge summary.    Reference:    ALCIRA Kelly., ROBERTH Burgess., Goldberg, M., TERESE Pope., ALCIRA John, & RITA Leahy. (2016). Revised National Pressure Ulcer Advisory Panel Pressure Injury Staging System: Revised Pressure Injury Staging System. J Wound Ostomy Continence Nurs, 43(6), 585-597. doi:10.1097/won.6270765542592032    Form No.[id number]

## 2023-10-13 NOTE — PHYSICIAN QUERY
PT Name: Chato Bowie  MR #: [id number]     DOCUMENTATION CLARIFICATION     CDS/: Mandi Sandhu RN CDIS              Contact information: [email or web address]    This form is a permanent document in the medical record.     Query Date: October 13, 2023    By submitting this query, we are merely seeking further clarification of documentation.  Please utilize your independent clinical judgment when addressing the question(s) below.  Provider, please provide the integumentary diagnosis related to the documentation of (Left heel ):   The Medical Record contains the following:    H&P   Multiple wounds  Noticed to have decubitus ulcers  With sacral 2 x 1 cm  Dressed, consult wound care at this time.  Frequent turning    Wound care consult     Left plantar heel- Mushy 5 cm area with dark roof. Appears to be dark blistered area suggesting DTI. States sits up in wheelchair at NH       The clinical guidelines noted are only a system guideline. It does not replace the providers clinical judgment.    Per the National Pressure Injury Advisory Panel:   A pressure injury is localized damage to the skin and underlying soft tissue usually over a bony prominence or related to a medical or other device. The injury can present as intact skin or an open ulcer and may be painful. The injury occurs as a result of intense and/or prolonged pressure or pressure in combination with shear. The tolerance of soft tissue for pressure and shear may also be affected by microclimate, nutrition, perfusion, co-morbidities and condition of the soft tissue.       Stage 1 Pressure Injury:  Intact skin with a localized area of non-blanchable erythema, which may appear differently in darkly pigmented skin. Color changes do not include purple or maroon discoloration; these may indicate deep tissue pressure injury.    Stage 2 Pressure Injury:  Partial-thickness loss of skin with exposed dermis. The wound bed is viable, pink or red, moist, and may  also present as an intact or ruptured serum-filled blister.    Stage 3 Pressure Injury:  Full-thickness loss of skin, in which adipose (fat) is visible in the ulcer and granulation tissue and epibole (rolled wound edges) are often present. Slough and/or eschar may be visible. Undermining and tunneling may occur.    Stage 4 Pressure Injury:  Full-thickness skin and tissue loss with exposed or directly palpable fascia, muscle, tendon, ligament, cartilage or bone in the ulcer. Slough and/or eschar may be visible. Epibole (rolled edges), undermining and/or tunneling often occur.    Unstageable Pressure Injury:  Full-thickness skin and tissue loss in which the extent of tissue damage within the ulcer cannot be confirmed because it is obscured by slough or eschar. If slough or eschar is removed, a Stage 3 or Stage 4 pressure injury will be revealed.    Deep Tissue Pressure Injury:  Intact or non-intact skin with localized area of persistent non-blanchable deep red, maroon, purple discoloration or epidermal separation revealing a dark wound bed or blood filled blister. This injury results from intense and/or prolonged pressure and shear forces at the bone-muscle interface. The wound may evolve rapidly to reveal the actual extent of tissue injury, or may resolve without tissue loss. If necrotic tissue, subcutaneous tissue, granulation tissue, fascia, muscle or other underlying structures are visible, this indicates a full thickness pressure injury (Unstageable, Stage 3 or Stage 4). Do not use DTPI to describe vascular, traumatic, neuropathic, or dermatologic conditions.       Provider, please provide the integumentary diagnosis related to the documentation of (Left heel):   PLEASE SEND TO WOUND CARE  [   ] Deep Tissue Pressure Injury   [   ] Other Integumentary Diagnosis (please specify):______________       Please document in your progress notes daily for the duration of treatment until resolved and include in your  discharge summary.    Reference:    ALCIRA Kelly., ROBERTH Burgess., Goldberg, M., ALCIRA Pope, ALCIRA John, & RITA Leahy. (2016). Revised National Pressure Ulcer Advisory Panel Pressure Injury Staging System: Revised Pressure Injury Staging System. J Wound Ostomy Continence Nurs, 43(6), 585-597. doi:10.1097/won.[id number]    Form No.[id number]

## 2023-10-13 NOTE — PHYSICIAN QUERY
PT Name: Chato Bowie  MR #: [id number]     DOCUMENTATION CLARIFICATION     CDS/: Mandi Sandhu RN CDIS            Contact information: [email or web address]    This form is a permanent document in the medical record.     Query Date: October 13, 2023    By submitting this query, we are merely seeking further clarification of documentation.  Please utilize your independent clinical judgment when addressing the question(s) below.  Provider, please provide the integumentary diagnosis related to the documentation of (Sacral wound ):   The Medical Record contains the following:    H&P   Findings: Lesion (sacral decubitus ulcer) present.  Multiple wounds  Noticed to have decubitus ulcers  With sacral 2 x 1 cm  Dressed, consult wound care at this time.  Frequent turning    Wound care consult 9/20   Sacrum/gluteal cleft- Pink scar/healed       The clinical guidelines noted are only a system guideline. It does not replace the providers clinical judgment.    Per the National Pressure Injury Advisory Panel:   A pressure injury is localized damage to the skin and underlying soft tissue usually over a bony prominence or related to a medical or other device. The injury can present as intact skin or an open ulcer and may be painful. The injury occurs as a result of intense and/or prolonged pressure or pressure in combination with shear. The tolerance of soft tissue for pressure and shear may also be affected by microclimate, nutrition, perfusion, co-morbidities and condition of the soft tissue.       Stage 1 Pressure Injury:  Intact skin with a localized area of non-blanchable erythema, which may appear differently in darkly pigmented skin. Color changes do not include purple or maroon discoloration; these may indicate deep tissue pressure injury.    Stage 2 Pressure Injury:  Partial-thickness loss of skin with exposed dermis. The wound bed is viable, pink or red, moist, and may also present as an intact or ruptured  serum-filled blister.    Stage 3 Pressure Injury:  Full-thickness loss of skin, in which adipose (fat) is visible in the ulcer and granulation tissue and epibole (rolled wound edges) are often present. Slough and/or eschar may be visible. Undermining and tunneling may occur.    Stage 4 Pressure Injury:  Full-thickness skin and tissue loss with exposed or directly palpable fascia, muscle, tendon, ligament, cartilage or bone in the ulcer. Slough and/or eschar may be visible. Epibole (rolled edges), undermining and/or tunneling often occur.    Unstageable Pressure Injury:  Full-thickness skin and tissue loss in which the extent of tissue damage within the ulcer cannot be confirmed because it is obscured by slough or eschar. If slough or eschar is removed, a Stage 3 or Stage 4 pressure injury will be revealed.    Deep Tissue Pressure Injury:  Intact or non-intact skin with localized area of persistent non-blanchable deep red, maroon, purple discoloration or epidermal separation revealing a dark wound bed or blood filled blister. This injury results from intense and/or prolonged pressure and shear forces at the bone-muscle interface. The wound may evolve rapidly to reveal the actual extent of tissue injury, or may resolve without tissue loss. If necrotic tissue, subcutaneous tissue, granulation tissue, fascia, muscle or other underlying structures are visible, this indicates a full thickness pressure injury (Unstageable, Stage 3 or Stage 4). Do not use DTPI to describe vascular, traumatic, neuropathic, or dermatologic conditions.       Provider, please provide the integumentary diagnosis related to the documentation of (Sacral wound ):   PLEASE SEND TO WOUND CARE  [   ] Pressure Injury/Decubitus Ulcer, Specify Stage :___________     [   ] Pressure Injury/Decubitus Ulcer, Resolved    [   ] Other Integumentary Diagnosis (please specify):______________       Please document in your progress notes daily for the duration of  treatment until resolved and include in your discharge summary.    Reference:    ALCIRA Kelly., ROBERTH Burgess., Goldberg, M., ALCIRA Pope, ALCIRA John, & ZOEY Leahy (2016). Revised National Pressure Ulcer Advisory Panel Pressure Injury Staging System: Revised Pressure Injury Staging System. J Wound Ostomy Continence Nurs, 43(6), 585-597. doi:10.1097/won.7093451926518173    Form No.[id number]

## 2023-10-17 NOTE — PHYSICIAN QUERY
PT Name: Chato Bowie  MR #: [id number]     DOCUMENTATION CLARIFICATION     CDS/: Mandi Sandhu RN CDIS              Contact information: [email or web address]    This form is a permanent document in the medical record.     Query Date: October 17, 2023    By submitting this query, we are merely seeking further clarification of documentation.  Please utilize your independent clinical judgment when addressing the question(s) below.  Provider, please provide the integumentary diagnosis related to the documentation of (Left heel ):   The Medical Record contains the following:    H&P   Multiple wounds  Noticed to have decubitus ulcers  With sacral 2 x 1 cm  Dressed, consult wound care at this time.  Frequent turning    Wound care consult     Left plantar heel- Mushy 5 cm area with dark roof. Appears to be dark blistered area suggesting DTI. States sits up in wheelchair at NH       The clinical guidelines noted are only a system guideline. It does not replace the providers clinical judgment.    Per the National Pressure Injury Advisory Panel:   A pressure injury is localized damage to the skin and underlying soft tissue usually over a bony prominence or related to a medical or other device. The injury can present as intact skin or an open ulcer and may be painful. The injury occurs as a result of intense and/or prolonged pressure or pressure in combination with shear. The tolerance of soft tissue for pressure and shear may also be affected by microclimate, nutrition, perfusion, co-morbidities and condition of the soft tissue.       Stage 1 Pressure Injury:  Intact skin with a localized area of non-blanchable erythema, which may appear differently in darkly pigmented skin. Color changes do not include purple or maroon discoloration; these may indicate deep tissue pressure injury.    Stage 2 Pressure Injury:  Partial-thickness loss of skin with exposed dermis. The wound bed is viable, pink or red, moist, and may  also present as an intact or ruptured serum-filled blister.    Stage 3 Pressure Injury:  Full-thickness loss of skin, in which adipose (fat) is visible in the ulcer and granulation tissue and epibole (rolled wound edges) are often present. Slough and/or eschar may be visible. Undermining and tunneling may occur.    Stage 4 Pressure Injury:  Full-thickness skin and tissue loss with exposed or directly palpable fascia, muscle, tendon, ligament, cartilage or bone in the ulcer. Slough and/or eschar may be visible. Epibole (rolled edges), undermining and/or tunneling often occur.    Unstageable Pressure Injury:  Full-thickness skin and tissue loss in which the extent of tissue damage within the ulcer cannot be confirmed because it is obscured by slough or eschar. If slough or eschar is removed, a Stage 3 or Stage 4 pressure injury will be revealed.    Deep Tissue Pressure Injury:  Intact or non-intact skin with localized area of persistent non-blanchable deep red, maroon, purple discoloration or epidermal separation revealing a dark wound bed or blood filled blister. This injury results from intense and/or prolonged pressure and shear forces at the bone-muscle interface. The wound may evolve rapidly to reveal the actual extent of tissue injury, or may resolve without tissue loss. If necrotic tissue, subcutaneous tissue, granulation tissue, fascia, muscle or other underlying structures are visible, this indicates a full thickness pressure injury (Unstageable, Stage 3 or Stage 4). Do not use DTPI to describe vascular, traumatic, neuropathic, or dermatologic conditions.       Provider, please provide the integumentary diagnosis related to the documentation of (Left heel):     [  x ] Deep Tissue Pressure Injury   [   ] Other Integumentary Diagnosis (please specify):______________       Please document in your progress notes daily for the duration of treatment until resolved and include in your discharge  summary.    Reference:    ALCIRA Kelly., ROBERTH Burgess., Goldberg, M., ALCIRA Pope, ALCIRA John, & RITA Leahy. (2016). Revised National Pressure Ulcer Advisory Panel Pressure Injury Staging System: Revised Pressure Injury Staging System. J Wound Ostomy Continence Nurs, 43(6), 585-597. doi:10.1097/won.7752665664734072    Form No.[id number]

## 2023-10-17 NOTE — PHYSICIAN QUERY
PT Name: Chato Bowie  MR #: [id number]     DOCUMENTATION CLARIFICATION     CDS/: Mandi Sandhu RN CDIS             Contact information: [email or web address]    This form is a permanent document in the medical record.     Query Date: October 17, 2023    By submitting this query, we are merely seeking further clarification of documentation.  Please utilize your independent clinical judgment when addressing the question(s) below.  Provider, please provide the integumentary diagnosis related to the documentation of (Right lateral back/waist):   The Medical Record contains the following:    Wound care consult   Right lateral back/waist level- 9 mm healing wound; possibly injury from garment or back of sitting surface; states up in wheelchair at NH  Treatment:  Local wound care to right back wound and left plantar heel with Mepilex foam dressing. Continue      note 9/21   Multiple wounds  Noticed to have decubitus ulcers  With sacral 2 x 1 cm  Dressed, consult wound care at this time.  Frequent turning           The clinical guidelines noted are only a system guideline. It does not replace the providers clinical judgment.    Per the National Pressure Injury Advisory Panel:   A pressure injury is localized damage to the skin and underlying soft tissue usually over a bony prominence or related to a medical or other device. The injury can present as intact skin or an open ulcer and may be painful. The injury occurs as a result of intense and/or prolonged pressure or pressure in combination with shear. The tolerance of soft tissue for pressure and shear may also be affected by microclimate, nutrition, perfusion, co-morbidities and condition of the soft tissue.       Stage 1 Pressure Injury:  Intact skin with a localized area of non-blanchable erythema, which may appear differently in darkly pigmented skin. Color changes do not include purple or maroon discoloration; these may indicate deep tissue pressure  injury.    Stage 2 Pressure Injury:  Partial-thickness loss of skin with exposed dermis. The wound bed is viable, pink or red, moist, and may also present as an intact or ruptured serum-filled blister.    Stage 3 Pressure Injury:  Full-thickness loss of skin, in which adipose (fat) is visible in the ulcer and granulation tissue and epibole (rolled wound edges) are often present. Slough and/or eschar may be visible. Undermining and tunneling may occur.    Stage 4 Pressure Injury:  Full-thickness skin and tissue loss with exposed or directly palpable fascia, muscle, tendon, ligament, cartilage or bone in the ulcer. Slough and/or eschar may be visible. Epibole (rolled edges), undermining and/or tunneling often occur.    Unstageable Pressure Injury:  Full-thickness skin and tissue loss in which the extent of tissue damage within the ulcer cannot be confirmed because it is obscured by slough or eschar. If slough or eschar is removed, a Stage 3 or Stage 4 pressure injury will be revealed.    Deep Tissue Pressure Injury:  Intact or non-intact skin with localized area of persistent non-blanchable deep red, maroon, purple discoloration or epidermal separation revealing a dark wound bed or blood filled blister. This injury results from intense and/or prolonged pressure and shear forces at the bone-muscle interface. The wound may evolve rapidly to reveal the actual extent of tissue injury, or may resolve without tissue loss. If necrotic tissue, subcutaneous tissue, granulation tissue, fascia, muscle or other underlying structures are visible, this indicates a full thickness pressure injury (Unstageable, Stage 3 or Stage 4). Do not use DTPI to describe vascular, traumatic, neuropathic, or dermatologic conditions.       Provider, please provide the integumentary diagnosis related to the documentation of (Right lateral back/waist):     [   ] Pressure Injury/Decubitus Ulcer, Stage 1   [   ] Pressure Injury/Decubitus Ulcer, Stage 2    [   x] Pressure Injury/Decubitus Ulcer, Stage 3   [   ] Pressure Injury/Decubitus Ulcer, Stage 4   [   ] Pressure Injury/Decubitus Ulcer, Unstageable   [   ] Non-pressure ulcer, skin breakdown only   [   ] Non-pressure ulcer, exposed fat layer   [   ] Non-pressure ulcer, other depth (please specify): ___________   [   ] Other Integumentary Diagnosis (please specify):______________       Please document in your progress notes daily for the duration of treatment until resolved and include in your discharge summary.    Reference:    ALCIRA Kelly., ROBERTH Burgess., Goldberg, M., TERESE Pope., TERESE John., & RITA Leayh. (2016). Revised National Pressure Ulcer Advisory Panel Pressure Injury Staging System: Revised Pressure Injury Staging System. J Wound Ostomy Continence Nurs, 43(6), 585-597. doi:10.1097/won.4292877511699669    Form No.[id number]

## 2023-10-17 NOTE — PHYSICIAN QUERY
PT Name: Chato Bowie  MR #: [id number]     DOCUMENTATION CLARIFICATION     CDS/: Mandi Sandhu RN CDIS            Contact information: [email or web address]    This form is a permanent document in the medical record.     Query Date: October 17, 2023    By submitting this query, we are merely seeking further clarification of documentation.  Please utilize your independent clinical judgment when addressing the question(s) below.  Provider, please provide the integumentary diagnosis related to the documentation of (Sacral wound ):   The Medical Record contains the following:    H&P   Findings: Lesion (sacral decubitus ulcer) present.  Multiple wounds  Noticed to have decubitus ulcers  With sacral 2 x 1 cm  Dressed, consult wound care at this time.  Frequent turning    Wound care consult 9/20   Sacrum/gluteal cleft- Pink scar/healed       The clinical guidelines noted are only a system guideline. It does not replace the providers clinical judgment.    Per the National Pressure Injury Advisory Panel:   A pressure injury is localized damage to the skin and underlying soft tissue usually over a bony prominence or related to a medical or other device. The injury can present as intact skin or an open ulcer and may be painful. The injury occurs as a result of intense and/or prolonged pressure or pressure in combination with shear. The tolerance of soft tissue for pressure and shear may also be affected by microclimate, nutrition, perfusion, co-morbidities and condition of the soft tissue.       Stage 1 Pressure Injury:  Intact skin with a localized area of non-blanchable erythema, which may appear differently in darkly pigmented skin. Color changes do not include purple or maroon discoloration; these may indicate deep tissue pressure injury.    Stage 2 Pressure Injury:  Partial-thickness loss of skin with exposed dermis. The wound bed is viable, pink or red, moist, and may also present as an intact or ruptured  serum-filled blister.    Stage 3 Pressure Injury:  Full-thickness loss of skin, in which adipose (fat) is visible in the ulcer and granulation tissue and epibole (rolled wound edges) are often present. Slough and/or eschar may be visible. Undermining and tunneling may occur.    Stage 4 Pressure Injury:  Full-thickness skin and tissue loss with exposed or directly palpable fascia, muscle, tendon, ligament, cartilage or bone in the ulcer. Slough and/or eschar may be visible. Epibole (rolled edges), undermining and/or tunneling often occur.    Unstageable Pressure Injury:  Full-thickness skin and tissue loss in which the extent of tissue damage within the ulcer cannot be confirmed because it is obscured by slough or eschar. If slough or eschar is removed, a Stage 3 or Stage 4 pressure injury will be revealed.    Deep Tissue Pressure Injury:  Intact or non-intact skin with localized area of persistent non-blanchable deep red, maroon, purple discoloration or epidermal separation revealing a dark wound bed or blood filled blister. This injury results from intense and/or prolonged pressure and shear forces at the bone-muscle interface. The wound may evolve rapidly to reveal the actual extent of tissue injury, or may resolve without tissue loss. If necrotic tissue, subcutaneous tissue, granulation tissue, fascia, muscle or other underlying structures are visible, this indicates a full thickness pressure injury (Unstageable, Stage 3 or Stage 4). Do not use DTPI to describe vascular, traumatic, neuropathic, or dermatologic conditions.       Provider, please provide the integumentary diagnosis related to the documentation of (Sacral wound ):     [   ] Pressure Injury/Decubitus Ulcer, Specify Stage :___________     [   ] Pressure Injury/Decubitus Ulcer, Resolved    [   x] Other Integumentary Diagnosis (please specify):moisture associated skin damage________       Please document in your progress notes daily for the duration of  treatment until resolved and include in your discharge summary.    Reference:    ALCIRA Kelly., ROBERTH Burgess., Goldberg, M., ALCIRA Pope, ALCIRA John, & ZOEY Leahy (2016). Revised National Pressure Ulcer Advisory Panel Pressure Injury Staging System: Revised Pressure Injury Staging System. J Wound Ostomy Continence Nurs, 43(6), 585-597. doi:10.1097/won.[id number]    Form No.[id number]

## 2023-11-20 PROBLEM — J96.01 ACUTE HYPOXEMIC RESPIRATORY FAILURE: Status: RESOLVED | Noted: 2022-05-12 | Resolved: 2023-11-20

## 2023-11-27 ENCOUNTER — HOSPITAL ENCOUNTER (INPATIENT)
Facility: HOSPITAL | Age: 73
LOS: 11 days | Discharge: NURSING, REHAB OR LONG-TERM CARE | DRG: 264 | End: 2023-12-08
Attending: EMERGENCY MEDICINE | Admitting: HOSPITALIST
Payer: MEDICARE

## 2023-11-27 DIAGNOSIS — L97.509 FOOT ULCER: ICD-10-CM

## 2023-11-27 DIAGNOSIS — Z01.818 PRE-OP EVALUATION: ICD-10-CM

## 2023-11-27 DIAGNOSIS — I70.223 CRITICAL LIMB ISCHEMIA OF BOTH LOWER EXTREMITIES: ICD-10-CM

## 2023-11-27 DIAGNOSIS — I70.244 ATHEROSCLEROSIS OF NATIVE ARTERY OF BOTH LOWER EXTREMITIES WITH BILATERAL ULCERATION OF HEELS: ICD-10-CM

## 2023-11-27 DIAGNOSIS — R29.898 WEAKNESS OF LEFT LEG: ICD-10-CM

## 2023-11-27 DIAGNOSIS — R07.9 CHEST PAIN: ICD-10-CM

## 2023-11-27 DIAGNOSIS — I70.234 ATHEROSCLEROSIS OF NATIVE ARTERY OF BOTH LOWER EXTREMITIES WITH BILATERAL ULCERATION OF HEELS: ICD-10-CM

## 2023-11-27 DIAGNOSIS — M25.462 KNEE EFFUSION, LEFT: ICD-10-CM

## 2023-11-27 DIAGNOSIS — I70.245 ATHEROSCLEROSIS OF NATIVE ARTERY OF BOTH LOWER EXTREMITIES WITH BILATERAL ULCERATION OF OTHER PART OF FEET: ICD-10-CM

## 2023-11-27 DIAGNOSIS — I50.32 CHRONIC DIASTOLIC HEART FAILURE: ICD-10-CM

## 2023-11-27 DIAGNOSIS — E43 SEVERE MALNUTRITION: ICD-10-CM

## 2023-11-27 DIAGNOSIS — I70.235 ATHEROSCLEROSIS OF NATIVE ARTERY OF BOTH LOWER EXTREMITIES WITH BILATERAL ULCERATION OF OTHER PART OF FEET: ICD-10-CM

## 2023-11-27 DIAGNOSIS — M86.9 OSTEOMYELITIS OF FOOT: ICD-10-CM

## 2023-11-27 DIAGNOSIS — T07.XXXA MULTIPLE WOUNDS: ICD-10-CM

## 2023-11-27 DIAGNOSIS — M86.179 OTHER ACUTE OSTEOMYELITIS OF FOOT, UNSPECIFIED LATERALITY: Primary | ICD-10-CM

## 2023-11-27 LAB
ALBUMIN SERPL BCP-MCNC: 2.6 G/DL (ref 3.5–5.2)
ALP SERPL-CCNC: 100 U/L (ref 55–135)
ALT SERPL W/O P-5'-P-CCNC: 38 U/L (ref 10–44)
ANION GAP SERPL CALC-SCNC: 13 MMOL/L (ref 8–16)
AST SERPL-CCNC: 28 U/L (ref 10–40)
BACTERIA #/AREA URNS HPF: ABNORMAL /HPF
BASOPHILS # BLD AUTO: 0.09 K/UL (ref 0–0.2)
BASOPHILS NFR BLD: 0.9 % (ref 0–1.9)
BILIRUB SERPL-MCNC: 0.5 MG/DL (ref 0.1–1)
BILIRUB UR QL STRIP: NEGATIVE
BNP SERPL-MCNC: 472 PG/ML (ref 0–99)
BUN SERPL-MCNC: 51 MG/DL (ref 8–23)
CALCIUM SERPL-MCNC: 9.5 MG/DL (ref 8.7–10.5)
CHLORIDE SERPL-SCNC: 94 MMOL/L (ref 95–110)
CLARITY UR: ABNORMAL
CO2 SERPL-SCNC: 29 MMOL/L (ref 23–29)
COLOR UR: ABNORMAL
CREAT SERPL-MCNC: 2.2 MG/DL (ref 0.5–1.4)
CRP SERPL-MCNC: 57.8 MG/L (ref 0–8.2)
DIFFERENTIAL METHOD: ABNORMAL
EOSINOPHIL # BLD AUTO: 0.3 K/UL (ref 0–0.5)
EOSINOPHIL NFR BLD: 3.1 % (ref 0–8)
ERYTHROCYTE [DISTWIDTH] IN BLOOD BY AUTOMATED COUNT: 16.5 % (ref 11.5–14.5)
ERYTHROCYTE [SEDIMENTATION RATE] IN BLOOD BY WESTERGREN METHOD: 83 MM/HR (ref 0–10)
EST. GFR  (NO RACE VARIABLE): 31 ML/MIN/1.73 M^2
GLUCOSE SERPL-MCNC: 187 MG/DL (ref 70–110)
GLUCOSE UR QL STRIP: NEGATIVE
HCT VFR BLD AUTO: 30.8 % (ref 40–54)
HGB BLD-MCNC: 9.1 G/DL (ref 14–18)
HGB UR QL STRIP: ABNORMAL
HYALINE CASTS #/AREA URNS LPF: 17 /LPF
IMM GRANULOCYTES # BLD AUTO: 0.03 K/UL (ref 0–0.04)
IMM GRANULOCYTES NFR BLD AUTO: 0.3 % (ref 0–0.5)
INR PPP: 1.2 (ref 0.8–1.2)
KETONES UR QL STRIP: NEGATIVE
LACTATE SERPL-SCNC: 0.8 MMOL/L (ref 0.5–2.2)
LEUKOCYTE ESTERASE UR QL STRIP: ABNORMAL
LYMPHOCYTES # BLD AUTO: 1.2 K/UL (ref 1–4.8)
LYMPHOCYTES NFR BLD: 11.7 % (ref 18–48)
MCH RBC QN AUTO: 23.3 PG (ref 27–31)
MCHC RBC AUTO-ENTMCNC: 29.5 G/DL (ref 32–36)
MCV RBC AUTO: 79 FL (ref 82–98)
MICROSCOPIC COMMENT: ABNORMAL
MONOCYTES # BLD AUTO: 1.2 K/UL (ref 0.3–1)
MONOCYTES NFR BLD: 11.9 % (ref 4–15)
NEUTROPHILS # BLD AUTO: 7.3 K/UL (ref 1.8–7.7)
NEUTROPHILS NFR BLD: 72.1 % (ref 38–73)
NITRITE UR QL STRIP: NEGATIVE
NRBC BLD-RTO: 0 /100 WBC
PH UR STRIP: 6 [PH] (ref 5–8)
PLATELET # BLD AUTO: 290 K/UL (ref 150–450)
PMV BLD AUTO: 8.5 FL (ref 9.2–12.9)
POCT GLUCOSE: 189 MG/DL (ref 70–110)
POTASSIUM SERPL-SCNC: 3 MMOL/L (ref 3.5–5.1)
PROCALCITONIN SERPL IA-MCNC: 6.09 NG/ML
PROT SERPL-MCNC: 7 G/DL (ref 6–8.4)
PROT UR QL STRIP: ABNORMAL
PROTHROMBIN TIME: 12.4 SEC (ref 9–12.5)
RBC # BLD AUTO: 3.9 M/UL (ref 4.6–6.2)
RBC #/AREA URNS HPF: 66 /HPF (ref 0–4)
SODIUM SERPL-SCNC: 136 MMOL/L (ref 136–145)
SP GR UR STRIP: 1.01 (ref 1–1.03)
URN SPEC COLLECT METH UR: ABNORMAL
UROBILINOGEN UR STRIP-ACNC: NEGATIVE EU/DL
WBC # BLD AUTO: 10.17 K/UL (ref 3.9–12.7)
WBC #/AREA URNS HPF: >100 /HPF (ref 0–5)
YEAST URNS QL MICRO: ABNORMAL

## 2023-11-27 PROCEDURE — 11000001 HC ACUTE MED/SURG PRIVATE ROOM

## 2023-11-27 PROCEDURE — 82962 GLUCOSE BLOOD TEST: CPT

## 2023-11-27 PROCEDURE — 84145 PROCALCITONIN (PCT): CPT | Performed by: EMERGENCY MEDICINE

## 2023-11-27 PROCEDURE — 80053 COMPREHEN METABOLIC PANEL: CPT | Performed by: EMERGENCY MEDICINE

## 2023-11-27 PROCEDURE — 85025 COMPLETE CBC W/AUTO DIFF WBC: CPT | Performed by: EMERGENCY MEDICINE

## 2023-11-27 PROCEDURE — 87040 BLOOD CULTURE FOR BACTERIA: CPT | Mod: 59 | Performed by: EMERGENCY MEDICINE

## 2023-11-27 PROCEDURE — 93010 EKG 12-LEAD: ICD-10-PCS | Mod: ,,, | Performed by: INTERNAL MEDICINE

## 2023-11-27 PROCEDURE — 86140 C-REACTIVE PROTEIN: CPT | Performed by: EMERGENCY MEDICINE

## 2023-11-27 PROCEDURE — 93005 ELECTROCARDIOGRAM TRACING: CPT

## 2023-11-27 PROCEDURE — 81000 URINALYSIS NONAUTO W/SCOPE: CPT | Performed by: EMERGENCY MEDICINE

## 2023-11-27 PROCEDURE — 83605 ASSAY OF LACTIC ACID: CPT | Performed by: EMERGENCY MEDICINE

## 2023-11-27 PROCEDURE — 85610 PROTHROMBIN TIME: CPT | Performed by: EMERGENCY MEDICINE

## 2023-11-27 PROCEDURE — 87077 CULTURE AEROBIC IDENTIFY: CPT | Performed by: EMERGENCY MEDICINE

## 2023-11-27 PROCEDURE — 85652 RBC SED RATE AUTOMATED: CPT | Performed by: EMERGENCY MEDICINE

## 2023-11-27 PROCEDURE — 87088 URINE BACTERIA CULTURE: CPT | Performed by: EMERGENCY MEDICINE

## 2023-11-27 PROCEDURE — 87186 SC STD MICRODIL/AGAR DIL: CPT | Performed by: EMERGENCY MEDICINE

## 2023-11-27 PROCEDURE — 83880 ASSAY OF NATRIURETIC PEPTIDE: CPT | Performed by: EMERGENCY MEDICINE

## 2023-11-27 PROCEDURE — 87086 URINE CULTURE/COLONY COUNT: CPT | Performed by: EMERGENCY MEDICINE

## 2023-11-27 PROCEDURE — 93010 ELECTROCARDIOGRAM REPORT: CPT | Mod: ,,, | Performed by: INTERNAL MEDICINE

## 2023-11-27 PROCEDURE — 99285 EMERGENCY DEPT VISIT HI MDM: CPT | Mod: 25

## 2023-11-27 RX ORDER — FOLIC ACID 1 MG/1
1 TABLET ORAL DAILY
COMMUNITY

## 2023-11-27 RX ORDER — POTASSIUM CHLORIDE 750 MG/1
10 CAPSULE, EXTENDED RELEASE ORAL DAILY
Status: ON HOLD | COMMUNITY
End: 2024-01-19 | Stop reason: HOSPADM

## 2023-11-27 RX ORDER — ASCORBIC ACID 500 MG
500 TABLET ORAL 2 TIMES DAILY
COMMUNITY

## 2023-11-27 RX ORDER — INSULIN ASPART 100 [IU]/ML
INJECTION, SOLUTION INTRAVENOUS; SUBCUTANEOUS
COMMUNITY

## 2023-11-27 NOTE — Clinical Note
Left: Groin.   Scrubbed with Chlorhexidine/Alcohol.    Hair: N/A.  Skin prep dry before draping.  Prepped by: Indra Monk, RT 12/6/2023 3:00 PM.

## 2023-11-27 NOTE — ED NOTES
Care assumed, from myles nurse, patient with bilateral heel eschar, bilateral lateral foot eschar noted. Sacral stage 2, and right gluteal stage 2 noted, applied mepalex dressing to sacrum, no orders for heel dressing yet, floated off bed.

## 2023-11-27 NOTE — ED TRIAGE NOTES
Pt had xray done on L leg due to swelling and pain showing osteomyelitis. Pt was sent to the ER for tx. Denies injury, fever, chest pain, sob. Pt is not ambulatory. Awaiting further orders

## 2023-11-27 NOTE — ED PROVIDER NOTES
Encounter Date: 11/27/2023    SCRIBE #1 NOTE: I, Celso Finch, am scribing for, and in the presence of,  Abiel Hebert MD. I have scribed the following portions of the note - Other sections scribed: HPI, ROS, PE.       History     Chief Complaint   Patient presents with    Knee Pain     Ems called to 74yo male that has been having left knee pain. He had an xray on 11/22/23 and it resulted today and the nursing home was notified that he has osteomyelitis in his knee and was sent her for further evaluation and treatment.      Chato Bowie is a 73 y.o. male, with a past medical history of CAD, DM, hyperlipidemia, HTN, who presents to the ED with knee pain. Patient states he has been having swelling and pain in his left knee. Patient reports having an xray on 11/22/23 and the results came back today which said he has osteomyelitis in his knee. Patient says he was told by the nursing home to come into the ER. No other exacerbating or alleviating factors.    The history is provided by the patient. No  was used.     Review of patient's allergies indicates:  No Known Allergies  Past Medical History:   Diagnosis Date    Acute congestive heart failure 3/20/2020    Alcohol abuse     Arthritis     Asthma attack 11/24/2021    Coronary artery disease     Diabetes mellitus     Hyperlipemia     Hypertension     Multiple thyroid nodules 6/14/2023    Rhabdomyolysis      Past Surgical History:   Procedure Laterality Date    ECHOCARDIOGRAM,TRANSESOPHAGEAL N/A 9/21/2023    Procedure: Transesophageal echo (MARIA) intra-procedure log documentation;  Surgeon: Luisana Rodríguez MD;  Location: [facility] CATH LAB;  Service: Cardiology;  Laterality: N/A;    EYE SURGERY      TRANSESOPHAGEAL ECHOCARDIOGRAM WITH POSSIBLE CARDIOVERSION (MARIA W/ POSS CARDIOVERSION) N/A 1/5/2023    Procedure: Transesophageal echo (MARIA) intra-procedure log documentation;  Surgeon: Indra Foote MD;  Location: [facility] CATH LAB;  Service:  Cardiology;  Laterality: N/A;    TRANSESOPHAGEAL ECHOCARDIOGRAPHY N/A 9/21/2023    Procedure: ECHOCARDIOGRAM, TRANSESOPHAGEAL;  Surgeon: Luisana Rodríguez MD;  Location: [facility] CATH LAB;  Service: Cardiology;  Laterality: N/A;    TREATMENT OF CARDIAC ARRHYTHMIA N/A 12/7/2020    Procedure: Cardioversion or Defibrillation;  Surgeon: Indra Foote MD;  Location: [facility] CATH LAB;  Service: Cardiology;  Laterality: N/A;    TREATMENT OF CARDIAC ARRHYTHMIA N/A 12/30/2020    Procedure: Cardioversion or Defibrillation;  Surgeon: Tyrone Steen MD;  Location: [facility] CATH LAB;  Service: Cardiology;  Laterality: N/A;    TREATMENT OF CARDIAC ARRHYTHMIA N/A 1/5/2023    Procedure: Cardioversion or Defibrillation;  Surgeon: Indra Foote MD;  Location: [facility] CATH LAB;  Service: Cardiology;  Laterality: N/A;    VASCULAR SURGERY       Family History   Problem Relation Age of Onset    Hypertension Mother     Diabetes Mother     Diabetes Father     Hypertension Father     Diabetes Sister     Hypertension Sister      Social History     Tobacco Use    Smoking status: Never    Smokeless tobacco: Never   Substance Use Topics    Alcohol use: Not Currently     Alcohol/week: 6.0 standard drinks of alcohol     Types: 6 Cans of beer per week    Drug use: No     Review of Systems   Constitutional:  Negative for fever.   HENT:  Negative for facial swelling and sore throat.    Eyes:  Negative for pain and discharge.   Respiratory:  Negative for choking and shortness of breath.    Cardiovascular:  Negative for chest pain.   Gastrointestinal:  Negative for abdominal pain, nausea and vomiting.   Genitourinary:  Negative for dysuria and frequency.   Musculoskeletal:  Positive for arthralgias and joint swelling. Negative for back pain.   Skin:  Negative for rash.   Neurological:  Negative for weakness and numbness.       Physical Exam     Initial Vitals [11/27/23 1307]   BP Pulse Resp Temp SpO2   135/67 63 18 98.5 °F (36.9 °C) 100 %      MAP        --         Physical Exam    Nursing note and vitals reviewed.  Constitutional: He appears well-developed and well-nourished. No distress.   HENT:   Head: Atraumatic.   Eyes: EOM are normal. Pupils are equal, round, and reactive to light.   Neck: Neck supple. No JVD present.   Normal range of motion.  Cardiovascular:  Normal rate, regular rhythm, normal heart sounds and intact distal pulses.     Exam reveals no gallop and no friction rub.       No murmur heard.  Pulmonary/Chest: Breath sounds normal.   Abdominal: Abdomen is soft. Bowel sounds are normal. There is no abdominal tenderness.   Musculoskeletal:         General: Edema present. Normal range of motion.      Cervical back: Normal range of motion and neck supple.      Comments: Bilateral lower leg edema. Left greater than right. Left knee effusion without warmth or ttp. Bilateral heel ulcers. unstageable with overlying black eschars. No drainage or redness.      Lymphadenopathy:     He has no cervical adenopathy.   Neurological: He is alert and oriented to person, place, and time.   Bilateral leg weakness   Skin: Skin is warm and dry.   Psychiatric: He has a normal mood and affect. Thought content normal.         ED Course   Procedures  Labs Reviewed   CBC W/ AUTO DIFFERENTIAL - Abnormal; Notable for the following components:       Result Value    RBC 3.90 (*)     Hemoglobin 9.1 (*)     Hematocrit 30.8 (*)     MCV 79 (*)     MCH 23.3 (*)     MCHC 29.5 (*)     RDW 16.5 (*)     MPV 8.5 (*)     Mono # 1.2 (*)     Lymph % 11.7 (*)     All other components within normal limits   COMPREHENSIVE METABOLIC PANEL - Abnormal; Notable for the following components:    Potassium 3.0 (*)     Chloride 94 (*)     Glucose 187 (*)     BUN 51 (*)     Creatinine 2.2 (*)     Albumin 2.6 (*)     eGFR 31 (*)     All other components within normal limits   SEDIMENTATION RATE - Abnormal; Notable for the following components:    Sed Rate 83 (*)     All other components within normal  limits   C-REACTIVE PROTEIN - Abnormal; Notable for the following components:    CRP 57.8 (*)     All other components within normal limits   PROCALCITONIN - Abnormal; Notable for the following components:    Procalcitonin 6.09 (*)     All other components within normal limits   B-TYPE NATRIURETIC PEPTIDE - Abnormal; Notable for the following components:     (*)     All other components within normal limits   URINALYSIS, REFLEX TO URINE CULTURE - Abnormal; Notable for the following components:    Color, UA Orange (*)     Appearance, UA Cloudy (*)     Protein, UA 1+ (*)     Occult Blood UA 2+ (*)     Leukocytes, UA 3+ (*)     All other components within normal limits    Narrative:     Specimen Source->Urine   URINALYSIS MICROSCOPIC - Abnormal; Notable for the following components:    RBC, UA 66 (*)     WBC, UA >100 (*)     Bacteria Moderate (*)     Yeast, UA Occasional (*)     Hyaline Casts, UA 17 (*)     All other components within normal limits    Narrative:     Specimen Source->Urine   POCT GLUCOSE - Abnormal; Notable for the following components:    POCT Glucose 189 (*)     All other components within normal limits   CULTURE, BLOOD   CULTURE, BLOOD   CULTURE, URINE   LACTIC ACID, PLASMA   PROTIME-INR   B-TYPE NATRIURETIC PEPTIDE   POCT GLUCOSE MONITORING CONTINUOUS          Imaging Results              X-Ray Chest AP Portable (Final result)  Result time 11/27/23 17:32:49      Final result by Jenna Owens MD (11/27/23 17:32:49)                   Impression:      Mild cardiomegaly.  Nonspecific prominence of the interstitium.  Probable bilateral pleural effusions.  The possibility of mild CHF cannot be entirely excluded.      Electronically signed by: Jenna Owens  Date:    11/27/2023  Time:    17:32               Narrative:    EXAMINATION:  AP PORTABLE CHEST    CLINICAL HISTORY:  pre op;    TECHNIQUE:  AP portable chest radiograph was submitted.    COMPARISON:  09/23/2023    FINDINGS:  AP  portable chest radiograph demonstrates mild enlargement of the cardiac silhouette.  There is tortuosity of the descending thoracic aorta.  There is mild nonspecific prominence of the interstitium.  There is subtle blunting of the costophrenic angles, which may suggest small pleural effusions and/or pleural thickening.  There is no focal consolidation or pneumothorax.                                       X-Ray Foot Complete Left (Final result)  Result time 11/27/23 17:30:04      Final result by Jenna Owens MD (11/27/23 17:30:04)                   Impression:      No acute bony abnormality detected.  Degenerative changes of the midfoot and the 1st MTP.      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    FINDINGS:  Three views of the right foot demonstrate no acute fracture or dislocation.  Degenerative changes are seen involving the 1st metatarsophalangeal joint and the midfoot.  Small Achilles and moderate plantar spurring is noted.  The bones are diffusely osteopenic.  There is advanced vascular calcifications.    Three views of the left foot demonstrate acute fracture or dislocation.  Degenerative changes are seen involving the 1st metatarsophalangeal joint and the midfoot. Small Achilles and moderate plantar spurring is noted. The bones are diffusely osteopenic. There is advanced vascular calcifications.                                       X-Ray Foot Complete Right (Final result)  Result time 11/27/23 17:30:04      Final result by Jenna Owens MD (11/27/23 17:30:04)                   Impression:      No acute bony abnormality detected.  Degenerative changes of the midfoot and the 1st  MTP.      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    FINDINGS:  Three views of the right foot demonstrate no acute fracture or dislocation.  Degenerative changes are seen involving the 1st metatarsophalangeal joint and the midfoot.  Small Achilles and moderate plantar spurring is noted.  The bones are diffusely osteopenic.  There is advanced vascular calcifications.    Three views of the left foot demonstrate acute fracture or dislocation.  Degenerative changes are seen involving the 1st metatarsophalangeal joint and the midfoot. Small Achilles and moderate plantar spurring is noted. The bones are diffusely osteopenic. There is advanced vascular calcifications.                                       X-Ray Knee 1 or 2 View Left (Final result)  Result time 11/27/23 17:22:44      Final result by Jenna Owens MD (11/27/23 17:22:44)                   Impression:      No acute bony abnormality detected.  Large knee joint effusion.  Relatively stable degenerative changes.    Bone infarcts.      Electronically signed by: Jenna Owens  Date:    11/27/2023  Time:    17:22               Narrative:    EXAMINATION:  TWO VIEWS OF THE LEFT KNEE    CLINICAL HISTORY:  Effusion, left knee    TECHNIQUE:  AP and lateral view of the left knee    COMPARISON:  07/31/2023    FINDINGS:  Two views of the left knee demonstrate no acute fracture or dislocation.  There is narrowing and continued sclerosing of the medial knee joint compartment.  In addition, there is narrowing of the inferior aspect of the patellofemoral space.  There is mild patellar spurring.  There is a large suprapatellar effusion.  Again seen are amorphous  sclerotic densities in the visualized femur and tibia, which are likely bone infarcts.  Bones are osteopenic.  Advanced vascular calcifications are present.                                       Medications - No data to display  Medical Decision Making  This is an emergent evaluation of a 73 y.o. male who presents with knee pain. The patient was seen and examined. The history and physical exam was obtained. The nursing notes and vital signs were reviewed. Secondary to symptoms and examination findings.    Ddx includes viral  Arthritis, Gout.    Amount and/or Complexity of Data Reviewed  Labs: ordered.  Radiology: ordered.    Risk  Decision regarding hospitalization.    Patient sent in from nursing home for evaluation of heel wounds.  They did an x-ray on the 22nd that came back as possible osteomyelitis of the left heel.  However on x-ray here today we do not shows suggestion of osteomyelitis.  Possible over-read however the heel ulcers are unstageable.  They seemed to be failing conservative therapy of Betadine painting and heel protectors at the nursing home.  Will bring in for Podiatry evaluation and potential debridement.  This is due to patient's ESR, CRP and procalcitonin being elevated.  Will hold antibiotics at this time.  Urinalysis pending at the time of admission.        Scribe Attestation:   Scribe #1: I performed the above scribed service and the documentation accurately describes the services I performed. I attest to the accuracy of the note.                               Clinical Impression:  Final diagnoses:  [Z01.818] Pre-op evaluation  [L97.509] Foot ulcer  [M25.462] Knee effusion, left          ED Disposition Condition    Admit            I, Abiel Hebert, personally performed the services described in this documentation. All medical record entries made by the scribe were at my direction and in my presence. I have reviewed the chart and agree that the record reflects my personal performance and  is accurate and complete.       Abiel Hebert MD  11/27/23 203

## 2023-11-28 PROBLEM — I70.209 ATHEROSCLEROSIS OF NATIVE ARTERY OF EXTREMITY: Status: ACTIVE | Noted: 2023-11-28

## 2023-11-28 PROBLEM — N18.32 STAGE 3B CHRONIC KIDNEY DISEASE (CKD): Status: ACTIVE | Noted: 2020-11-24

## 2023-11-28 PROBLEM — I50.32 CHRONIC DIASTOLIC HEART FAILURE: Status: ACTIVE | Noted: 2023-11-28

## 2023-11-28 PROBLEM — I50.32 CHRONIC DIASTOLIC HEART FAILURE: Status: RESOLVED | Noted: 2022-05-12 | Resolved: 2023-11-28

## 2023-11-28 LAB
ANION GAP SERPL CALC-SCNC: 10 MMOL/L (ref 8–16)
ANION GAP SERPL CALC-SCNC: 7 MMOL/L (ref 8–16)
BUN SERPL-MCNC: 48 MG/DL (ref 8–23)
BUN SERPL-MCNC: 51 MG/DL (ref 8–23)
CALCIUM SERPL-MCNC: 9.6 MG/DL (ref 8.7–10.5)
CALCIUM SERPL-MCNC: 9.6 MG/DL (ref 8.7–10.5)
CHLORIDE SERPL-SCNC: 96 MMOL/L (ref 95–110)
CHLORIDE SERPL-SCNC: 97 MMOL/L (ref 95–110)
CO2 SERPL-SCNC: 31 MMOL/L (ref 23–29)
CO2 SERPL-SCNC: 33 MMOL/L (ref 23–29)
CREAT SERPL-MCNC: 1.9 MG/DL (ref 0.5–1.4)
CREAT SERPL-MCNC: 2.2 MG/DL (ref 0.5–1.4)
ERYTHROCYTE [DISTWIDTH] IN BLOOD BY AUTOMATED COUNT: 16.4 % (ref 11.5–14.5)
EST. GFR  (NO RACE VARIABLE): 31 ML/MIN/1.73 M^2
EST. GFR  (NO RACE VARIABLE): 37 ML/MIN/1.73 M^2
FERRITIN SERPL-MCNC: 398 NG/ML (ref 20–300)
GLUCOSE SERPL-MCNC: 114 MG/DL (ref 70–110)
GLUCOSE SERPL-MCNC: 139 MG/DL (ref 70–110)
GRAM STN SPEC: NORMAL
GRAM STN SPEC: NORMAL
HCT VFR BLD AUTO: 30.5 % (ref 40–54)
HGB BLD-MCNC: 9.2 G/DL (ref 14–18)
IRON SERPL-MCNC: 18 UG/DL (ref 45–160)
MAGNESIUM SERPL-MCNC: 2 MG/DL (ref 1.6–2.6)
MCH RBC QN AUTO: 23.2 PG (ref 27–31)
MCHC RBC AUTO-ENTMCNC: 30.2 G/DL (ref 32–36)
MCV RBC AUTO: 77 FL (ref 82–98)
PHOSPHATE SERPL-MCNC: 2.9 MG/DL (ref 2.7–4.5)
PLATELET # BLD AUTO: 260 K/UL (ref 150–450)
PMV BLD AUTO: 8.1 FL (ref 9.2–12.9)
POCT GLUCOSE: 104 MG/DL (ref 70–110)
POCT GLUCOSE: 120 MG/DL (ref 70–110)
POCT GLUCOSE: 136 MG/DL (ref 70–110)
POCT GLUCOSE: 143 MG/DL (ref 70–110)
POTASSIUM SERPL-SCNC: 2.7 MMOL/L (ref 3.5–5.1)
POTASSIUM SERPL-SCNC: 3.2 MMOL/L (ref 3.5–5.1)
RBC # BLD AUTO: 3.97 M/UL (ref 4.6–6.2)
SATURATED IRON: 7 % (ref 20–50)
SODIUM SERPL-SCNC: 136 MMOL/L (ref 136–145)
SODIUM SERPL-SCNC: 138 MMOL/L (ref 136–145)
TOTAL IRON BINDING CAPACITY: 263 UG/DL (ref 250–450)
TRANSFERRIN SERPL-MCNC: 178 MG/DL (ref 200–375)
WBC # BLD AUTO: 8.63 K/UL (ref 3.9–12.7)

## 2023-11-28 PROCEDURE — 83540 ASSAY OF IRON: CPT | Performed by: STUDENT IN AN ORGANIZED HEALTH CARE EDUCATION/TRAINING PROGRAM

## 2023-11-28 PROCEDURE — 99223 PR INITIAL HOSPITAL CARE,LEVL III: ICD-10-PCS | Mod: ,,, | Performed by: SURGERY

## 2023-11-28 PROCEDURE — 80048 BASIC METABOLIC PNL TOTAL CA: CPT | Performed by: STUDENT IN AN ORGANIZED HEALTH CARE EDUCATION/TRAINING PROGRAM

## 2023-11-28 PROCEDURE — 99223 1ST HOSP IP/OBS HIGH 75: CPT | Mod: ,,, | Performed by: SURGERY

## 2023-11-28 PROCEDURE — 87077 CULTURE AEROBIC IDENTIFY: CPT | Performed by: PODIATRIST

## 2023-11-28 PROCEDURE — 99222 1ST HOSP IP/OBS MODERATE 55: CPT | Mod: ,,, | Performed by: PODIATRIST

## 2023-11-28 PROCEDURE — 83735 ASSAY OF MAGNESIUM: CPT | Performed by: STUDENT IN AN ORGANIZED HEALTH CARE EDUCATION/TRAINING PROGRAM

## 2023-11-28 PROCEDURE — 84466 ASSAY OF TRANSFERRIN: CPT | Performed by: STUDENT IN AN ORGANIZED HEALTH CARE EDUCATION/TRAINING PROGRAM

## 2023-11-28 PROCEDURE — 87205 SMEAR GRAM STAIN: CPT | Performed by: PODIATRIST

## 2023-11-28 PROCEDURE — 36415 COLL VENOUS BLD VENIPUNCTURE: CPT | Performed by: STUDENT IN AN ORGANIZED HEALTH CARE EDUCATION/TRAINING PROGRAM

## 2023-11-28 PROCEDURE — 25000003 PHARM REV CODE 250: Performed by: STUDENT IN AN ORGANIZED HEALTH CARE EDUCATION/TRAINING PROGRAM

## 2023-11-28 PROCEDURE — 87075 CULTR BACTERIA EXCEPT BLOOD: CPT | Performed by: PODIATRIST

## 2023-11-28 PROCEDURE — 84100 ASSAY OF PHOSPHORUS: CPT | Performed by: STUDENT IN AN ORGANIZED HEALTH CARE EDUCATION/TRAINING PROGRAM

## 2023-11-28 PROCEDURE — 87070 CULTURE OTHR SPECIMN AEROBIC: CPT | Performed by: PODIATRIST

## 2023-11-28 PROCEDURE — 82728 ASSAY OF FERRITIN: CPT | Performed by: STUDENT IN AN ORGANIZED HEALTH CARE EDUCATION/TRAINING PROGRAM

## 2023-11-28 PROCEDURE — 87186 SC STD MICRODIL/AGAR DIL: CPT | Performed by: PODIATRIST

## 2023-11-28 PROCEDURE — 85027 COMPLETE CBC AUTOMATED: CPT | Performed by: STUDENT IN AN ORGANIZED HEALTH CARE EDUCATION/TRAINING PROGRAM

## 2023-11-28 PROCEDURE — 99222 PR INITIAL HOSPITAL CARE,LEVL II: ICD-10-PCS | Mod: ,,, | Performed by: PODIATRIST

## 2023-11-28 PROCEDURE — 63600175 PHARM REV CODE 636 W HCPCS: Performed by: STUDENT IN AN ORGANIZED HEALTH CARE EDUCATION/TRAINING PROGRAM

## 2023-11-28 PROCEDURE — 80048 BASIC METABOLIC PNL TOTAL CA: CPT | Mod: 91 | Performed by: STUDENT IN AN ORGANIZED HEALTH CARE EDUCATION/TRAINING PROGRAM

## 2023-11-28 PROCEDURE — 11000001 HC ACUTE MED/SURG PRIVATE ROOM

## 2023-11-28 RX ORDER — GLUCAGON 1 MG
1 KIT INJECTION
Status: DISCONTINUED | OUTPATIENT
Start: 2023-11-28 | End: 2023-12-08 | Stop reason: HOSPADM

## 2023-11-28 RX ORDER — ACETAMINOPHEN 325 MG/1
650 TABLET ORAL EVERY 8 HOURS PRN
Status: DISCONTINUED | OUTPATIENT
Start: 2023-11-28 | End: 2023-12-08 | Stop reason: HOSPADM

## 2023-11-28 RX ORDER — POTASSIUM CHLORIDE 20 MEQ/1
20 TABLET, EXTENDED RELEASE ORAL ONCE
Status: COMPLETED | OUTPATIENT
Start: 2023-11-28 | End: 2023-11-28

## 2023-11-28 RX ORDER — INSULIN ASPART 100 [IU]/ML
2 INJECTION, SOLUTION INTRAVENOUS; SUBCUTANEOUS
Status: DISCONTINUED | OUTPATIENT
Start: 2023-11-28 | End: 2023-12-08 | Stop reason: HOSPADM

## 2023-11-28 RX ORDER — TALC
6 POWDER (GRAM) TOPICAL NIGHTLY PRN
Status: DISCONTINUED | OUTPATIENT
Start: 2023-11-28 | End: 2023-12-08 | Stop reason: HOSPADM

## 2023-11-28 RX ORDER — IBUPROFEN 200 MG
16 TABLET ORAL
Status: DISCONTINUED | OUTPATIENT
Start: 2023-11-28 | End: 2023-12-08 | Stop reason: HOSPADM

## 2023-11-28 RX ORDER — ACETAMINOPHEN 325 MG/1
650 TABLET ORAL EVERY 4 HOURS PRN
Status: DISCONTINUED | OUTPATIENT
Start: 2023-11-28 | End: 2023-12-08 | Stop reason: HOSPADM

## 2023-11-28 RX ORDER — LANOLIN ALCOHOL/MO/W.PET/CERES
1 CREAM (GRAM) TOPICAL DAILY
Status: DISCONTINUED | OUTPATIENT
Start: 2023-11-28 | End: 2023-12-02

## 2023-11-28 RX ORDER — NAPROXEN SODIUM 220 MG/1
81 TABLET, FILM COATED ORAL DAILY
Status: DISCONTINUED | OUTPATIENT
Start: 2023-11-29 | End: 2023-12-08 | Stop reason: HOSPADM

## 2023-11-28 RX ORDER — POTASSIUM CHLORIDE 20 MEQ/1
40 TABLET, EXTENDED RELEASE ORAL ONCE
Status: COMPLETED | OUTPATIENT
Start: 2023-11-28 | End: 2023-11-28

## 2023-11-28 RX ORDER — ONDANSETRON 8 MG/1
8 TABLET, ORALLY DISINTEGRATING ORAL EVERY 8 HOURS PRN
Status: DISCONTINUED | OUTPATIENT
Start: 2023-11-28 | End: 2023-12-08 | Stop reason: HOSPADM

## 2023-11-28 RX ORDER — FOLIC ACID 1 MG/1
1 TABLET ORAL DAILY
Status: DISCONTINUED | OUTPATIENT
Start: 2023-11-28 | End: 2023-12-08 | Stop reason: HOSPADM

## 2023-11-28 RX ORDER — METOLAZONE 5 MG/1
5 TABLET ORAL DAILY
Status: DISCONTINUED | OUTPATIENT
Start: 2023-11-28 | End: 2023-12-07

## 2023-11-28 RX ORDER — AMOXICILLIN 250 MG
2 CAPSULE ORAL 2 TIMES DAILY PRN
Status: DISCONTINUED | OUTPATIENT
Start: 2023-11-28 | End: 2023-12-05

## 2023-11-28 RX ORDER — FUROSEMIDE 40 MG/1
40 TABLET ORAL 2 TIMES DAILY
Status: DISCONTINUED | OUTPATIENT
Start: 2023-11-28 | End: 2023-11-30

## 2023-11-28 RX ORDER — INSULIN ASPART 100 [IU]/ML
0-5 INJECTION, SOLUTION INTRAVENOUS; SUBCUTANEOUS
Status: DISCONTINUED | OUTPATIENT
Start: 2023-11-28 | End: 2023-12-08 | Stop reason: HOSPADM

## 2023-11-28 RX ORDER — IBUPROFEN 200 MG
24 TABLET ORAL
Status: DISCONTINUED | OUTPATIENT
Start: 2023-11-28 | End: 2023-12-08 | Stop reason: HOSPADM

## 2023-11-28 RX ORDER — SIMETHICONE 80 MG
1 TABLET,CHEWABLE ORAL 4 TIMES DAILY PRN
Status: DISCONTINUED | OUTPATIENT
Start: 2023-11-28 | End: 2023-12-08 | Stop reason: HOSPADM

## 2023-11-28 RX ORDER — SODIUM CHLORIDE 0.9 % (FLUSH) 0.9 %
10 SYRINGE (ML) INJECTION
Status: DISCONTINUED | OUTPATIENT
Start: 2023-11-28 | End: 2023-12-08 | Stop reason: HOSPADM

## 2023-11-28 RX ORDER — TAMSULOSIN HYDROCHLORIDE 0.4 MG/1
0.4 CAPSULE ORAL DAILY
Status: DISCONTINUED | OUTPATIENT
Start: 2023-11-28 | End: 2023-12-08 | Stop reason: HOSPADM

## 2023-11-28 RX ORDER — ATORVASTATIN CALCIUM 40 MG/1
40 TABLET, FILM COATED ORAL NIGHTLY
Status: DISCONTINUED | OUTPATIENT
Start: 2023-11-28 | End: 2023-12-08 | Stop reason: HOSPADM

## 2023-11-28 RX ORDER — NALOXONE HCL 0.4 MG/ML
0.02 VIAL (ML) INJECTION
Status: DISCONTINUED | OUTPATIENT
Start: 2023-11-28 | End: 2023-12-07

## 2023-11-28 RX ORDER — ONDANSETRON 2 MG/ML
4 INJECTION INTRAMUSCULAR; INTRAVENOUS EVERY 8 HOURS PRN
Status: DISCONTINUED | OUTPATIENT
Start: 2023-11-28 | End: 2023-12-08 | Stop reason: HOSPADM

## 2023-11-28 RX ORDER — HYDROCODONE BITARTRATE AND ACETAMINOPHEN 5; 325 MG/1; MG/1
1 TABLET ORAL EVERY 6 HOURS PRN
Status: DISCONTINUED | OUTPATIENT
Start: 2023-11-28 | End: 2023-12-08 | Stop reason: HOSPADM

## 2023-11-28 RX ORDER — AMIODARONE HYDROCHLORIDE 200 MG/1
200 TABLET ORAL DAILY
Status: DISCONTINUED | OUTPATIENT
Start: 2023-11-28 | End: 2023-12-08 | Stop reason: HOSPADM

## 2023-11-28 RX ADMIN — INSULIN ASPART 2 UNITS: 100 INJECTION, SOLUTION INTRAVENOUS; SUBCUTANEOUS at 05:11

## 2023-11-28 RX ADMIN — APIXABAN 5 MG: 5 TABLET, FILM COATED ORAL at 08:11

## 2023-11-28 RX ADMIN — INSULIN ASPART 2 UNITS: 100 INJECTION, SOLUTION INTRAVENOUS; SUBCUTANEOUS at 08:11

## 2023-11-28 RX ADMIN — POTASSIUM CHLORIDE 20 MEQ: 1500 TABLET, EXTENDED RELEASE ORAL at 05:11

## 2023-11-28 RX ADMIN — POTASSIUM CHLORIDE 40 MEQ: 1500 TABLET, EXTENDED RELEASE ORAL at 08:11

## 2023-11-28 RX ADMIN — INSULIN DETEMIR 8 UNITS: 100 INJECTION, SOLUTION SUBCUTANEOUS at 08:11

## 2023-11-28 RX ADMIN — FUROSEMIDE 40 MG: 40 TABLET ORAL at 05:11

## 2023-11-28 RX ADMIN — ATORVASTATIN CALCIUM 40 MG: 40 TABLET, FILM COATED ORAL at 09:11

## 2023-11-28 RX ADMIN — FERROUS SULFATE TAB 325 MG (65 MG ELEMENTAL FE) 1 EACH: 325 (65 FE) TAB at 08:11

## 2023-11-28 RX ADMIN — FUROSEMIDE 40 MG: 40 TABLET ORAL at 08:11

## 2023-11-28 RX ADMIN — TAMSULOSIN HYDROCHLORIDE 0.4 MG: 0.4 CAPSULE ORAL at 08:11

## 2023-11-28 RX ADMIN — FOLIC ACID 1 MG: 1 TABLET ORAL at 08:11

## 2023-11-28 RX ADMIN — METOPROLOL SUCCINATE 12.5 MG: 25 TABLET, EXTENDED RELEASE ORAL at 08:11

## 2023-11-28 RX ADMIN — INSULIN ASPART 2 UNITS: 100 INJECTION, SOLUTION INTRAVENOUS; SUBCUTANEOUS at 11:11

## 2023-11-28 RX ADMIN — INSULIN DETEMIR 8 UNITS: 100 INJECTION, SOLUTION SUBCUTANEOUS at 09:11

## 2023-11-28 RX ADMIN — METOLAZONE 5 MG: 5 TABLET ORAL at 08:11

## 2023-11-28 RX ADMIN — AMIODARONE HYDROCHLORIDE 200 MG: 200 TABLET ORAL at 08:11

## 2023-11-28 RX ADMIN — ATORVASTATIN CALCIUM 40 MG: 40 TABLET, FILM COATED ORAL at 02:11

## 2023-11-28 NOTE — ASSESSMENT & PLAN NOTE
His of paroxysmal atrial fibrillation with multiple MARIA and cardiovesion  Currently on amiodarone and metoprolol 50xl  HR is currently 54, will reduce dose of metoprolol  Will consult cardiology to review  Cont apixan due to risk of thromboembolic stroke

## 2023-11-28 NOTE — PLAN OF CARE
Problem: Adult Inpatient Plan of Care  Goal: Plan of Care Review  Outcome: Ongoing, Progressing  Goal: Patient-Specific Goal (Individualized)  Outcome: Ongoing, Progressing  Goal: Absence of Hospital-Acquired Illness or Injury  Outcome: Ongoing, Progressing  Goal: Optimal Comfort and Wellbeing  Outcome: Ongoing, Progressing  Goal: Readiness for Transition of Care  Outcome: Ongoing, Progressing     Problem: Skin Injury Risk Increased  Goal: Skin Health and Integrity  Outcome: Ongoing, Progressing     Problem: Diabetes Comorbidity  Goal: Blood Glucose Level Within Targeted Range  Outcome: Ongoing, Progressing     Problem: Adjustment to Illness (Sepsis/Septic Shock)  Goal: Optimal Coping  Outcome: Ongoing, Progressing     Problem: Bleeding (Sepsis/Septic Shock)  Goal: Absence of Bleeding  Outcome: Ongoing, Progressing     Problem: Glycemic Control Impaired (Sepsis/Septic Shock)  Goal: Blood Glucose Level Within Desired Range  Outcome: Ongoing, Progressing     Problem: Infection Progression (Sepsis/Septic Shock)  Goal: Absence of Infection Signs and Symptoms  Outcome: Ongoing, Progressing     Problem: Nutrition Impaired (Sepsis/Septic Shock)  Goal: Optimal Nutrition Intake  Outcome: Ongoing, Progressing     Problem: Fluid and Electrolyte Imbalance (Acute Kidney Injury/Impairment)  Goal: Fluid and Electrolyte Balance  Outcome: Ongoing, Progressing     Problem: Oral Intake Inadequate (Acute Kidney Injury/Impairment)  Goal: Optimal Nutrition Intake  Outcome: Ongoing, Progressing     Problem: Renal Function Impairment (Acute Kidney Injury/Impairment)  Goal: Effective Renal Function  Outcome: Ongoing, Progressing     Problem: Impaired Wound Healing  Goal: Optimal Wound Healing  Outcome: Ongoing, Progressing

## 2023-11-28 NOTE — PLAN OF CARE
Case Management Assessment     PCP: per [facility]  Pharmacy: per [facility]    Patient Arrived From: [facility]  Existing Help at Home: facility staff    Barriers to Discharge: none    Discharge Plan:    A. Return to the nursing home   B. SNF           11/27/23 1948   Discharge Assessment   Assessment Type Discharge Planning Assessment   Confirmed/corrected address, phone number and insurance Yes   Confirmed Demographics   (CM to correct)   Communicated ELY with patient/caregiver Date not available/Unable to determine   People in Home facility resident   Facility Arrived From: [facility]   Do you expect to return to your current living situation? Yes   Do you have help at home or someone to help you manage your care at home? Yes   Who are your caregiver(s) and their phone number(s)? facility staff   Prior to hospitilization cognitive status: Alert/Oriented   Current cognitive status: Alert/Oriented   Walking or Climbing Stairs ambulation difficulty, requires equipment   Mobility Management uses wheelchair   Home Accessibility wheelchair accessible   Equipment Currently Used at Home wheelchair   Readmission within 30 days? No   Do you take prescription medications? Yes   Do you have prescription coverage? Yes   Cover[facility]   Do you have any problems affording any of your prescribed medications? No   Is the patient taking medications as prescribed? yes   Who is going to help you get home at discharge? [facility]   Are you on dialysis? No   Do you take coumadin? No   DME Needed Upon Discharge  none   Discharge Plan discussed with: Patient   Transition of Care Barriers None   Discharge Plan A Return to nursing home   Discharge Plan B Skilled Nursing Facility

## 2023-11-28 NOTE — ASSESSMENT & PLAN NOTE
History of chronic left knee pain  Xray showed effusion  Septic arthritis vs gout vs effusion  Will consult ortho.podiatric to review  Knee tap for cell count,gram stain and culture

## 2023-11-28 NOTE — ASSESSMENT & PLAN NOTE
Chronic, uncontrolled. Latest blood pressure and vitals reviewed-     Temp:  [98.3 °F (36.8 °C)-98.5 °F (36.9 °C)]   Pulse:  [51-63]   Resp:  [16-21]   BP: (124-148)/(56-69)   SpO2:  [99 %-100 %] .   Home meds for hypertension were reviewed and noted below.   Hypertension Medications               furosemide (LASIX) 80 MG tablet Take 1 tablet (80 mg total) by mouth 2 (two) times daily.    metOLazone (ZAROXOLYN) 5 MG tablet Take 1 tablet (5 mg total) by mouth once daily.    metoprolol succinate (TOPROL-XL) 25 MG 24 hr tablet Take 25 mg by mouth once daily.            While in the hospital, will manage blood pressure as follows; Continue home antihypertensive regimen    Will utilize p.r.n. blood pressure medication only if patient's blood pressure greater than 180/110 and he develops symptoms such as worsening chest pain or shortness of breath.

## 2023-11-28 NOTE — ASSESSMENT & PLAN NOTE
History of grade 3 diastolic dysfunction  Does not seem to be in acute diastolic heart failure exacerbation  Will continue twice daily furosemide, with metolazone

## 2023-11-28 NOTE — CONSULTS
[facility]  Vascular Surgery  Consult Note    Inpatient consult to Vascular Surgery  Consult performed by: Osei Yeboah MD  Consult ordered by: Jenna Gutiérrez DPM  Reason for consult: CLI      Subjective:     Chief Complaint/Reason for Admission:  Bilateral foot pain with wounds and left knee pain    History of Present Illness:  73-year-old man past medical history of CAD in coronary stents, insulin-dependent diabetes, AFib on Eliquis congestive heart failure, and CKD who presents with bilateral foot pain and left knee pain.  Found on imaging to have left knee effusion, for which ortho has been consulted.  Also found to have bilateral foot wounds status post bedside debridement and culture with Podiatry.  Patient complains of right greater than left pain in his feet at rest.  He reports he is had these wounds on his feet for a several weeks.  Patient reports that he previously walk with the assistance but has been unable to walk due to pain.  Patient denies smoking, has never smoked.  Denies fever chills or signs of infection    Medications Prior to Admission   Medication Sig Dispense Refill Last Dose    albuterol (PROVENTIL/VENTOLIN HFA) 90 mcg/actuation inhaler Inhale 2 puffs into the lungs every 6 (six) hours as needed for Wheezing or Shortness of Breath. 8.5 g 0     allopurinoL (ZYLOPRIM) 100 MG tablet Take 1 tablet (100 mg total) by mouth once daily.       amiodarone (PACERONE) 200 MG Tab Take 200 mg by mouth once daily.       ascorbic acid, vitamin C, (VITAMIN C) 500 MG tablet Take 500 mg by mouth 2 (two) times daily.       ELIQUIS 5 mg Tab Take 5 mg by mouth 2 (two) times daily.       ferrous sulfate (FEOSOL) 325 mg (65 mg iron) Tab tablet Take 325 mg by mouth once daily.       folic acid (FOLVITE) 1 MG tablet Take 1 mg by mouth once daily.       furosemide (LASIX) 80 MG tablet Take 1 tablet (80 mg total) by mouth 2 (two) times daily. 60 tablet 11     gabapentin (NEURONTIN) 100 MG  capsule Take 1 capsule (100 mg total) by mouth 2 (two) times daily. 60 capsule 0     metOLazone (ZAROXOLYN) 5 MG tablet Take 1 tablet (5 mg total) by mouth once daily.       metoprolol succinate (TOPROL-XL) 25 MG 24 hr tablet Take 25 mg by mouth once daily.       multivitamin (ONE DAILY MULTIVITAMIN) per tablet Take 1 tablet by mouth once daily.       potassium chloride (MICRO-K) 10 MEQ CpSR Take 10 mEq by mouth once daily.       rosuvastatin (CRESTOR) 40 MG Tab Take 40 mg by mouth every evening.       senna-docusate 8.6-50 mg (PERICOLACE) 8.6-50 mg per tablet Take 2 tablets by mouth 2 (two) times daily as needed for Constipation.       tamsulosin (FLOMAX) 0.4 mg Cap Take 1 capsule (0.4 mg total) by mouth once daily. 30 capsule 11     insulin aspart U-100 (NOVOLOG) 100 unit/mL injection Inject into the skin. Per sliding scale          Review of patient's allergies indicates:  No Known Allergies    Past Medical History:   Diagnosis Date    Acute congestive heart failure 3/20/2020    Alcohol abuse     Arthritis     Asthma attack 11/24/2021    Coronary artery disease     Diabetes mellitus     Hyperlipemia     Hypertension     Multiple thyroid nodules 6/14/2023    Rhabdomyolysis      Past Surgical History:   Procedure Laterality Date    ECHOCARDIOGRAM,TRANSESOPHAGEAL N/A 9/21/2023    Procedure: Transesophageal echo (MARIA) intra-procedure log documentation;  Surgeon: Luisana Rodríguez MD;  Location: [facility] CATH LAB;  Service: Cardiology;  Laterality: N/A;    EYE SURGERY      TRANSESOPHAGEAL ECHOCARDIOGRAM WITH POSSIBLE CARDIOVERSION (MARIA W/ POSS CARDIOVERSION) N/A 1/5/2023    Procedure: Transesophageal echo (MARIA) intra-procedure log documentation;  Surgeon: Indra Foote MD;  Location: [facility] CATH LAB;  Service: Cardiology;  Laterality: N/A;    TRANSESOPHAGEAL ECHOCARDIOGRAPHY N/A 9/21/2023    Procedure: ECHOCARDIOGRAM, TRANSESOPHAGEAL;  Surgeon: Luisana Rodríguez MD;  Location: [facility] CATH LAB;  Service: Cardiology;   Laterality: N/A;    TREATMENT OF CARDIAC ARRHYTHMIA N/A 12/7/2020    Procedure: Cardioversion or Defibrillation;  Surgeon: Indra Foote MD;  Location: [facility] CATH LAB;  Service: Cardiology;  Laterality: N/A;    TREATMENT OF CARDIAC ARRHYTHMIA N/A 12/30/2020    Procedure: Cardioversion or Defibrillation;  Surgeon: Tyrone Steen MD;  Location: [facility] CATH LAB;  Service: Cardiology;  Laterality: N/A;    TREATMENT OF CARDIAC ARRHYTHMIA N/A 1/5/2023    Procedure: Cardioversion or Defibrillation;  Surgeon: Indra Foote MD;  Location: [facility] CATH LAB;  Service: Cardiology;  Laterality: N/A;    VASCULAR SURGERY       Family History       Problem Relation (Age of Onset)    Diabetes Mother, Father, Sister    Hypertension Mother, Father, Sister          Tobacco Use    Smoking status: Never    Smokeless tobacco: Never   Substance and Sexual Activity    Alcohol use: Not Currently     Alcohol/week: 6.0 standard drinks of alcohol     Types: 6 Cans of beer per week    Drug use: No    Sexual activity: Yes     Partners: Female     Review of Systems   All other systems reviewed and are negative.    Objective:     Vital Signs (Most Recent):  Temp: 98.3 °F (36.8 °C) (11/28/23 1115)  Pulse: (!) 59 (11/28/23 1115)  Resp: 16 (11/28/23 1115)  BP: (!) 142/65 (11/28/23 1115)  SpO2: 98 % (11/28/23 1115) Vital Signs (24h Range):  Temp:  [97.5 °F (36.4 °C)-98.4 °F (36.9 °C)] 98.3 °F (36.8 °C)  Pulse:  [51-60] 59  Resp:  [16-21] 16  SpO2:  [98 %-100 %] 98 %  BP: (124-148)/(56-69) 142/65     Weight: 96.6 kg (213 lb)  Body mass index is 29.71 kg/m².    Date 11/28/23 0700 - 11/29/23 0659   Shift 4619-6114 2247-3625 8917-2488 24 Hour Total   INTAKE   P.O. 240   240   Shift Total(mL/kg) 240(2.5)   240(2.5)   OUTPUT   Urine(mL/kg/hr) 350   350   Shift Total(mL/kg) 350(3.6)   350(3.6)   Weight (kg) 96.6 96.6 96.6 96.6       Physical Exam  Palpable femoral pulses bilaterally.  Nonpalpable pedal pulses with bilateral foot wounds  Significant  "Labs:  CBC:   Recent Labs   Lab 11/28/23  0520   WBC 8.63   RBC 3.97*   HGB 9.2*   HCT 30.5*      MCV 77*   MCH 23.2*   MCHC 30.2*     CMP:   Recent Labs   Lab 11/27/23  1509 11/28/23  0520   * 114*   CALCIUM 9.5 9.6   ALBUMIN 2.6*  --    PROT 7.0  --     138   K 3.0* 2.7*   CO2 29 31*   CL 94* 97   BUN 51* 48*   CREATININE 2.2* 1.9*   ALKPHOS 100  --    ALT 38  --    AST 28  --    BILITOT 0.5  --      Coagulation:   Recent Labs   Lab 11/27/23  1509   LABPROT 12.4   INR 1.2     eGFR: No results for input(s): "EGFRIFAFRICA", "EGFRCYSTC", "LABGLOM" in the last 48 hours.    Invalid input(s): "EGFRNON-AA"  Hemoglobin: No results for input(s): "HGBA1C" in the last 48 hours.  Lactic Acid:   Recent Labs   Lab 11/27/23  1509   LACTATE 0.8       Significant Diagnostics:  I have reviewed all pertinent imaging results/findings within the past 24 hours.    Assessment/Plan:     Active Diagnoses:    Diagnosis Date Noted POA    Chronic diastolic heart failure [I50.32] 11/28/2023 Yes    Multiple wounds [T07.XXXA] 07/28/2023 Yes     Chronic    DJD (degenerative joint disease) [M19.90] 06/14/2023 Yes    UTI (urinary tract infection) [N39.0] 09/05/2021 Yes    Paroxysmal atrial flutter [I48.92] 12/07/2020 Yes     Chronic    BPH (benign prostatic hyperplasia) [N40.0] 11/24/2020 Yes    Stage 3b chronic kidney disease (CKD) [N18.32] 11/24/2020 Yes    Left knee pain [M25.562] 03/23/2020 Yes    Alcohol abuse [F10.10] 02/14/2019 Yes    Type 2 diabetes mellitus with kidney complication, with long-term current use of insulin [E11.29, Z79.4] 03/10/2017 Not Applicable     Chronic    GERD (gastroesophageal reflux disease) [K21.9] 03/10/2017 Yes     Chronic    Essential hypertension [I10] 03/10/2017 Yes    Anemia [D64.9] 03/10/2017 Yes    Dyslipidemia [E78.5] 03/10/2017 Yes      Problems Resolved During this Admission:   73-year-old man past medical history of CAD CHF insulin-dependent diabetes, AFib on Eliquis congestive heart " failure, and CKD who presents with bilateral chronic limb-threatening ischemia with rest pain and tissue loss.      Recommend bilateral lower extremity angiogram, tentatively planned for Thursday 11-30.  We will plan to access left femoral and treat right leg given his right foot pain is worse than his left foot.  Risks and benefits of procedure discussed with the patient who agrees and consented  We will need to hold Eliquis now in anticipation of procedure, okay to transition to heparin drip if needed for Afib  Recommend hydration and optimization prior to procedure, We will plan to limit contrast given CKD  Recommend starting baby aspirin daily and continuing statin  Appreciate wound care and further recommendations per Podiatry  Defer to ortho for further workup and management of left knee effusion      Thank you for your consult. I will follow-up with patient. Please contact us if you have any additional questions.    Osei Yeboah MD  Vascular Surgery  [facility]

## 2023-11-28 NOTE — H&P
[facility]  History & Physical    Patient Name: Chato Bowie  MRN: [id number]  Patient Class: IP- Inpatient  Admission Date: 11/27/2023  Attending Physician: Abhijeet Veloz MD   Primary Care Provider: Irlanda Valenzuela -         Patient information was obtained from patient, past medical records, and ER records.     Subjective:     Principal Problem:<principal problem not specified>    Chief Complaint:   Chief Complaint   Patient presents with    Knee Pain     Ems called to 72yo male that has been having left knee pain. He had an xray on 11/22/23 and it resulted today and the nursing home was notified that he has osteomyelitis in his knee and was sent her for further evaluation and treatment.         HPI: 73-year-old  male,current a nursing home resident,who was brought to the ED for evaluation after receiving x-ray imaging obtained 5 days earlier with concern for possible osteomyelitis,prior to obtaining the knee and foot x-ray,he had complained of left knee pain,he also has bilateral heel decubitus ulcers,wound is covered with escar,and can not be staged,although it was reported that he has osteomyelitis but the xray obtained did not show evidence of osteo per report,pain in the knee is sharp,non radiating,could be 10 out 10 in severity some time,currently only able to move around in a wheel chair,he denied fever,chills or rigor,although reports straining at micturition and urgency but denied dysuria,frequency or hematuria.He denied chest pain,shortness of breath,or orthopnea,although he has chronic bilateral lower extremities swelling,worse in the left lower extremity compared to the right,he denied previous history of lower extremity deep vein thrombosis.He denied change in bowel habit,no diarrhea or constipation.  His medical history os significant for type 2 DM,hypertension,hyperlipidemia,heart failure,coronary artery disease, hyperlipidemia,alcohol abuse,CKD 3b and  multiple thyroid nodules.labs obtained showed elevated acute phase reactant with sed rate 83,procalcitonin 6.09,crp 57.8,but x-ray did not show evidence, will need MRI to confirm early osteomyelitis.    Past Medical History:   Diagnosis Date    Acute congestive heart failure 3/20/2020    Alcohol abuse     Arthritis     Asthma attack 11/24/2021    Coronary artery disease     Diabetes mellitus     Hyperlipemia     Hypertension     Multiple thyroid nodules 6/14/2023    Rhabdomyolysis        Past Surgical History:   Procedure Laterality Date    ECHOCARDIOGRAM,TRANSESOPHAGEAL N/A 9/21/2023    Procedure: Transesophageal echo (MARIA) intra-procedure log documentation;  Surgeon: Luisana Rodríguez MD;  Location: [facility] CATH LAB;  Service: Cardiology;  Laterality: N/A;    EYE SURGERY      TRANSESOPHAGEAL ECHOCARDIOGRAM WITH POSSIBLE CARDIOVERSION (MARIA W/ POSS CARDIOVERSION) N/A 1/5/2023    Procedure: Transesophageal echo (MARIA) intra-procedure log documentation;  Surgeon: Indra Foote MD;  Location: [facility] CATH LAB;  Service: Cardiology;  Laterality: N/A;    TRANSESOPHAGEAL ECHOCARDIOGRAPHY N/A 9/21/2023    Procedure: ECHOCARDIOGRAM, TRANSESOPHAGEAL;  Surgeon: Luisana Rodríguez MD;  Location: [facility] CATH LAB;  Service: Cardiology;  Laterality: N/A;    TREATMENT OF CARDIAC ARRHYTHMIA N/A 12/7/2020    Procedure: Cardioversion or Defibrillation;  Surgeon: Indra Foote MD;  Location: [facility] CATH LAB;  Service: Cardiology;  Laterality: N/A;    TREATMENT OF CARDIAC ARRHYTHMIA N/A 12/30/2020    Procedure: Cardioversion or Defibrillation;  Surgeon: Tyrone Steen MD;  Location: [facility] CATH LAB;  Service: Cardiology;  Laterality: N/A;    TREATMENT OF CARDIAC ARRHYTHMIA N/A 1/5/2023    Procedure: Cardioversion or Defibrillation;  Surgeon: Indra Foote MD;  Location: [facility] CATH LAB;  Service: Cardiology;  Laterality: N/A;    VASCULAR SURGERY         Review of patient's allergies indicates:  No Known Allergies    No current  facility-administered medications on file prior to encounter.     Current Outpatient Medications on File Prior to Encounter   Medication Sig    albuterol (PROVENTIL/VENTOLIN HFA) 90 mcg/actuation inhaler Inhale 2 puffs into the lungs every 6 (six) hours as needed for Wheezing or Shortness of Breath.    allopurinoL (ZYLOPRIM) 100 MG tablet Take 1 tablet (100 mg total) by mouth once daily.    amiodarone (PACERONE) 200 MG Tab Take 200 mg by mouth once daily.    ascorbic acid, vitamin C, (VITAMIN C) 500 MG tablet Take 500 mg by mouth 2 (two) times daily.    ELIQUIS 5 mg Tab Take 5 mg by mouth 2 (two) times daily.    ferrous sulfate (FEOSOL) 325 mg (65 mg iron) Tab tablet Take 325 mg by mouth once daily.    folic acid (FOLVITE) 1 MG tablet Take 1 mg by mouth once daily.    furosemide (LASIX) 80 MG tablet Take 1 tablet (80 mg total) by mouth 2 (two) times daily.    gabapentin (NEURONTIN) 100 MG capsule Take 1 capsule (100 mg total) by mouth 2 (two) times daily.    metOLazone (ZAROXOLYN) 5 MG tablet Take 1 tablet (5 mg total) by mouth once daily.    metoprolol succinate (TOPROL-XL) 25 MG 24 hr tablet Take 25 mg by mouth once daily.    multivitamin (ONE DAILY MULTIVITAMIN) per tablet Take 1 tablet by mouth once daily.    potassium chloride (MICRO-K) 10 MEQ CpSR Take 10 mEq by mouth once daily.    rosuvastatin (CRESTOR) 40 MG Tab Take 40 mg by mouth every evening.    senna-docusate 8.6-50 mg (PERICOLACE) 8.6-50 mg per tablet Take 2 tablets by mouth 2 (two) times daily as needed for Constipation.    tamsulosin (FLOMAX) 0.4 mg Cap Take 1 capsule (0.4 mg total) by mouth once daily.    insulin aspart U-100 (NOVOLOG) 100 unit/mL injection Inject into the skin. Per sliding scale     Family History       Problem Relation (Age of Onset)    Diabetes Mother, Father, Sister    Hypertension Mother, Father, Sister          Tobacco Use    Smoking status: Never    Smokeless tobacco: Never   Substance and Sexual Activity    Alcohol use: Not  Currently     Alcohol/week: 6.0 standard drinks of alcohol     Types: 6 Cans of beer per week    Drug use: No    Sexual activity: Yes     Partners: Female     Review of Systems   Constitutional:  Negative for appetite change, chills, diaphoresis and fatigue.   HENT:  Negative for congestion, sore throat and tinnitus.    Eyes:  Negative for pain, discharge and itching.   Respiratory:  Negative for cough, chest tightness, shortness of breath and wheezing.    Cardiovascular:  Positive for palpitations and leg swelling. Negative for chest pain.   Gastrointestinal:  Negative for abdominal distention, abdominal pain, blood in stool, nausea and vomiting.   Endocrine: Negative for cold intolerance, heat intolerance and polydipsia.   Genitourinary:  Negative for difficulty urinating, dysuria, flank pain, frequency and hematuria.   Musculoskeletal:  Positive for joint swelling. Negative for arthralgias, back pain and myalgias.   Skin:  Positive for rash (generalized scaly rash). Negative for color change and pallor.   Neurological:  Positive for weakness. Negative for dizziness, seizures, facial asymmetry, light-headedness, numbness and headaches.   Psychiatric/Behavioral:  Negative for agitation, confusion and hallucinations.      Objective:     Vital Signs (Most Recent):  Temp: 98.4 °F (36.9 °C) (11/27/23 2321)  Pulse: (!) 54 (11/27/23 2321)  Resp: 17 (11/27/23 2321)  BP: (!) 140/64 (11/27/23 2321)  SpO2: 99 % (11/27/23 2321) Vital Signs (24h Range):  Temp:  [98.3 °F (36.8 °C)-98.5 °F (36.9 °C)] 98.4 °F (36.9 °C)  Pulse:  [51-63] 54  Resp:  [16-21] 17  SpO2:  [99 %-100 %] 99 %  BP: (124-148)/(56-69) 140/64     Weight: 96.6 kg (213 lb)  Body mass index is 29.71 kg/m².     Physical Exam  Constitutional:       General: He is not in acute distress.     Appearance: He is ill-appearing. He is not toxic-appearing.   HENT:      Head: Normocephalic and atraumatic.      Nose: Nose normal. No congestion or rhinorrhea.       Mouth/Throat:      Mouth: Mucous membranes are moist.   Eyes:      Extraocular Movements: Extraocular movements intact.      Conjunctiva/sclera: Conjunctivae normal.      Pupils: Pupils are equal, round, and reactive to light.   Cardiovascular:      Rate and Rhythm: Regular rhythm. Bradycardia present.      Pulses: Normal pulses.      Heart sounds: Normal heart sounds.   Pulmonary:      Effort: Pulmonary effort is normal. No respiratory distress.      Breath sounds: Normal breath sounds. No stridor. No wheezing or rales.   Chest:      Chest wall: No tenderness.   Abdominal:      General: Abdomen is flat. Bowel sounds are normal. There is no distension.      Palpations: Abdomen is soft.      Tenderness: There is no abdominal tenderness. There is no right CVA tenderness, left CVA tenderness, guarding or rebound.   Musculoskeletal:         General: Swelling present. No tenderness.      Cervical back: Normal range of motion. No rigidity.      Right lower leg: Edema (+1) present.      Left lower leg: Edema (+2 pedal edema) present.   Skin:     General: Skin is warm and dry.      Coloration: Skin is not jaundiced.      Findings: Lesion and rash present.   Neurological:      General: No focal deficit present.      Mental Status: He is alert and oriented to person, place, and time. Mental status is at baseline.      Cranial Nerves: No cranial nerve deficit.      Sensory: No sensory deficit.   Psychiatric:         Mood and Affect: Mood normal.         Behavior: Behavior normal.         Thought Content: Thought content normal.              CRANIAL NERVES     CN III, IV, VI   Pupils are equal, round, and reactive to light.       Significant Labs: All pertinent labs within the past 24 hours have been reviewed.  A1C:   Recent Labs   Lab 06/02/23  1859 09/20/23  0446   HGBA1C 7.8* 5.5     Bilirubin:   Recent Labs   Lab 11/27/23  1509   BILITOT 0.5     Blood Culture:   Recent Labs   Lab 11/27/23  1512 11/27/23  1520   LABBLOO No  Growth to date No Growth to date     CBC:   Recent Labs   Lab 11/27/23  1509   WBC 10.17   HGB 9.1*   HCT 30.8*        CMP:   Recent Labs   Lab 11/27/23  1509      K 3.0*   CL 94*   CO2 29   *   BUN 51*   CREATININE 2.2*   CALCIUM 9.5   PROT 7.0   ALBUMIN 2.6*   BILITOT 0.5   ALKPHOS 100   AST 28   ALT 38   ANIONGAP 13     Cardiac Markers:   Recent Labs   Lab 11/27/23  1509   *     Coagulation:   Recent Labs   Lab 11/27/23  1509   INR 1.2     Lactic Acid:   Recent Labs   Lab 11/27/23  1509   LACTATE 0.8       TSH:   Recent Labs   Lab 09/20/23  0446   TSH 3.224     Urine Studies:   Recent Labs   Lab 11/27/23  1855   COLORU Orange*   APPEARANCEUA Cloudy*   PHUR 6.0   SPECGRAV 1.010   PROTEINUA 1+*   GLUCUA Negative   KETONESU Negative   BILIRUBINUA Negative   OCCULTUA 2+*   NITRITE Negative   UROBILINOGEN Negative   LEUKOCYTESUR 3+*   RBCUA 66*   WBCUA >100*   BACTERIA Moderate*   HYALINECASTS 17*       Significant Imaging: I have reviewed all pertinent imaging results/findings within the past 24 hours.  Imaging Results              X-Ray Chest AP Portable (Final result)  Result time 11/27/23 17:32:49      Final result by Jenna Owens MD (11/27/23 17:32:49)                   Impression:      Mild cardiomegaly.  Nonspecific prominence of the interstitium.  Probable bilateral pleural effusions.  The possibility of mild CHF cannot be entirely excluded.      Electronically signed by: Jenna Owens  Date:    11/27/2023  Time:    17:32               Narrative:    EXAMINATION:  AP PORTABLE CHEST    CLINICAL HISTORY:  pre op;    TECHNIQUE:  AP portable chest radiograph was submitted.    COMPARISON:  09/23/2023    FINDINGS:  AP portable chest radiograph demonstrates mild enlargement of the cardiac silhouette.  There is tortuosity of the descending thoracic aorta.  There is mild nonspecific prominence of the interstitium.  There is subtle blunting of the costophrenic angles, which may  suggest small pleural effusions and/or pleural thickening.  There is no focal consolidation or pneumothorax.                                       X-Ray Foot Complete Left (Final result)  Result time 11/27/23 17:30:04      Final result by Jenna Owens MD (11/27/23 17:30:04)                   Impression:      No acute bony abnormality detected.  Degenerative changes of the midfoot and the 1st MTP.      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    FINDINGS:  Three views of the right foot demonstrate no acute fracture or dislocation.  Degenerative changes are seen involving the 1st metatarsophalangeal joint and the midfoot.  Small Achilles and moderate plantar spurring is noted.  The bones are diffusely osteopenic.  There is advanced vascular calcifications.    Three views of the left foot demonstrate acute fracture or dislocation.  Degenerative changes are seen involving the 1st metatarsophalangeal joint and the midfoot. Small Achilles and moderate plantar spurring is noted. The bones are diffusely osteopenic. There is advanced vascular calcifications.                                       X-Ray Foot Complete Right (Final result)  Result time 11/27/23 17:30:04      Final result by Jenna Owens MD (11/27/23 17:30:04)                   Impression:      No acute bony abnormality detected.  Degenerative changes of the midfoot and the 1st MTP.      Electronically signed by: Jenna Owens  Date:    11/27/2023  Time:    17:30               Narrative:    EXAMINATION:  THREE VIEWS OF THE BILATERAL FEET    CLINICAL HISTORY:  Non-pressure chronic ulcer of other part of unspecified foot with unspecified  severity    TECHNIQUE:  AP, lateral, and oblique view of the both feet    COMPARISON:  None.    FINDINGS:  Three views of the right foot demonstrate no acute fracture or dislocation.  Degenerative changes are seen involving the 1st metatarsophalangeal joint and the midfoot.  Small Achilles and moderate plantar spurring is noted.  The bones are diffusely osteopenic.  There is advanced vascular calcifications.    Three views of the left foot demonstrate acute fracture or dislocation.  Degenerative changes are seen involving the 1st metatarsophalangeal joint and the midfoot. Small Achilles and moderate plantar spurring is noted. The bones are diffusely osteopenic. There is advanced vascular calcifications.                                       X-Ray Knee 1 or 2 View Left (Final result)  Result time 11/27/23 17:22:44      Final result by Jenna Owens MD (11/27/23 17:22:44)                   Impression:      No acute bony abnormality detected.  Large knee joint effusion.  Relatively stable degenerative changes.    Bone infarcts.      Electronically signed by: Jenna Owens  Date:    11/27/2023  Time:    17:22               Narrative:    EXAMINATION:  TWO VIEWS OF THE LEFT KNEE    CLINICAL HISTORY:  Effusion, left knee    TECHNIQUE:  AP and lateral view of the left knee    COMPARISON:  07/31/2023    FINDINGS:  Two views of the left knee demonstrate no acute fracture or dislocation.  There is narrowing and continued sclerosing of the medial knee joint compartment.  In addition, there is narrowing of the inferior aspect of the patellofemoral space.  There is mild patellar spurring.  There is a large suprapatellar effusion.  Again seen are amorphous sclerotic densities in the visualized femur and tibia, which are likely bone infarcts.  Bones are osteopenic.  Advanced vascular calcifications are present.                                      Assessment/Plan:     Chronic diastolic heart failure  History of grade 3  diastolic dysfunction  Does not seem to be in acute diastolic heart failure exacerbation  Will continue twice daily furosemide, with metolazone        Multiple wounds  History of multiple decubitus ulcers, previous buttock ulcers now healed  Has bilateral heel wound and lat rt foot  Xray was negative for DVT,however acute phase reactants were elevated  CRP 57.8,Sed rate 83,with procalcitonin elevated at 6.09  Will hold off on starting antibiotics  Podiatrist consult will see him this morning for possible bone biopsy,wound debridement and starting antibiotics      DJD (degenerative joint disease)  History of DJD  Complained of knee pain  X-ray did not show osteomyelitis  For MRI after podiatrist/orthopedic surgeon review      UTI (urinary tract infection)  Complained of urgency and straining at micturition  UA showed cloudy urine,with 3+ leukocyte esterase,rbc 66,wbc>100 and moderate bacteria  Currently symptoms has abaited, awaiting culture result  Will start antibiotics after bone biopsy this morning      Paroxysmal atrial flutter  His of paroxysmal atrial fibrillation with multiple MARIA and cardiovesion  Currently on amiodarone and metoprolol 50xl  HR is currently 54, will reduce dose of metoprolol  Will consult cardiology to review  Cont apixan due to risk of thromboembolic stroke      BPH (benign prostatic hyperplasia)  Stable  Will consider tamsulosin for symptomatic treatment      Stage 3b chronic kidney disease (CKD)  Creatine stable for now. BMP reviewed- noted Estimated Creatinine Clearance: 35.4 mL/min (A) (based on SCr of 2.2 mg/dL (H)). according to latest data. Based on current GFR, CKD stage is stage 3 - GFR 30-59.    Monitor UOP and serial BMP and adjust therapy as needed. Renally dose meds.   Avoid nephrotoxic medications and procedures.    Left knee pain  History of chronic left knee pain  Xray showed effusion  Septic arthritis vs gout vs effusion  Will consult ortho.podiatric to review  Knee tap for  cell count,gram stain and culture      Alcohol abuse  History of alcohol use  Now in remission, has not had a drink in the recent past  Not at risk of withdrawal  Cont multivitamin,thiamine and folic acid      GERD (gastroesophageal reflux disease)  Stable  Will hold off on restarting PPI  It increases risk of pneumonia and acute interstitial nephritis  He's not having symptoms of dyspepsia or reflux      Anemia  Patient's anemia is currently uncontrolled. Has not received any PRBCs to date. Etiology likely d/t Iron deficiency and chronic disease due to Chronic Kidney Disease/ESRD  Current CBC reviewed-   Lab Results   Component Value Date    HGB 9.1 (L) 11/27/2023    HCT 30.8 (L) 11/27/2023   Will obtain iron panel and ferritin  Monitor serial CBC and transfuse if patient becomes hemodynamically unstable, symptomatic or H/H drops below 7/21.    Type 2 diabetes mellitus with kidney complication, with long-term current use of insulin  Patient's FSGs are controlled on current medication regimen.  Last A1c reviewed-   Lab Results   Component Value Date    HGBA1C 5.5 09/20/2023     Most recent fingerstick glucose reviewed-   Recent Labs   Lab 11/27/23  1511   POCTGLUCOSE 189*     Current correctional scale  Low  Low hemoglobin A1C likely due to worsening kidney functions  Antihyperglycemics (From admission, onward)      None          Hold Oral hypoglycemics while patient is in the hospital.  Will start basal,bolus and correctional insulin as needed    Dyslipidemia  Continue statin  Stable,lifestyle modification      Essential hypertension  Chronic, uncontrolled. Latest blood pressure and vitals reviewed-     Temp:  [98.3 °F (36.8 °C)-98.5 °F (36.9 °C)]   Pulse:  [51-63]   Resp:  [16-21]   BP: (124-148)/(56-69)   SpO2:  [99 %-100 %] .   Home meds for hypertension were reviewed and noted below.   Hypertension Medications               furosemide (LASIX) 80 MG tablet Take 1 tablet (80 mg total) by mouth 2 (two) times  daily.    metOLazone (ZAROXOLYN) 5 MG tablet Take 1 tablet (5 mg total) by mouth once daily.    metoprolol succinate (TOPROL-XL) 25 MG 24 hr tablet Take 25 mg by mouth once daily.            While in the hospital, will manage blood pressure as follows; Continue home antihypertensive regimen    Will utilize p.r.n. blood pressure medication only if patient's blood pressure greater than 180/110 and he develops symptoms such as worsening chest pain or shortness of breath.      VTE Risk Mitigation (From admission, onward)           Ordered     apixaban tablet 5 mg  2 times daily         11/28/23 0140     Place sequential compression device  Until discontinued         11/28/23 0241     Reason for no Mechanical VTE Prophylaxis  Once        Question:  Reasons:  Answer:  Physician Provided (leave comment)    11/28/23 0241     IP VTE HIGH RISK PATIENT  Once         11/28/23 0241                       Patient admitted under my care to hospital medicine service 11/28/2023          AdmissionCare    Guideline: Osteomyelitis, Inpatient    Based on the indications selected for the patient, the bed status of Admit to Inpatient was determined to be MET    The following indications were selected as present at the time of evaluation of the patient:      - Surgical intervention required (eg, bone or soft tissue debridement, removal of foreign body, revascularization procedure) that necessitates inpatient care. See Foot: Surgical Wound Care as appropriate.    AdmissionCare documentation entered by: Gerry Brantley    [employer], 27th edition, Copyright © 2023 [employer], [employer] All Rights Reserved.  [id number]:05:29-06:00    Nolan Hutton MD  Department of Hospital Medicine  [facility]

## 2023-11-28 NOTE — ASSESSMENT & PLAN NOTE
Stable  Will hold off on restarting PPI  It increases risk of pneumonia and acute interstitial nephritis  He's not having symptoms of dyspepsia or reflux

## 2023-11-28 NOTE — ASSESSMENT & PLAN NOTE
Patient's FSGs are controlled on current medication regimen.  Last A1c reviewed-   Lab Results   Component Value Date    HGBA1C 5.5 09/20/2023     Most recent fingerstick glucose reviewed-   Recent Labs   Lab 11/27/23  1511   POCTGLUCOSE 189*     Current correctional scale  Low  Low hemoglobin A1C likely due to worsening kidney functions  Antihyperglycemics (From admission, onward)      None          Hold Oral hypoglycemics while patient is in the hospital.  Will start basal,bolus and correctional insulin as needed   Ambulatory

## 2023-11-28 NOTE — ASSESSMENT & PLAN NOTE
History of alcohol use  Now in remission, has not had a drink in the recent past  Not at risk of withdrawal  Cont multivitamin,thiamine and folic acid

## 2023-11-28 NOTE — SUBJECTIVE & OBJECTIVE
Past Medical History:   Diagnosis Date    Acute congestive heart failure 3/20/2020    Alcohol abuse     Arthritis     Asthma attack 11/24/2021    Coronary artery disease     Diabetes mellitus     Hyperlipemia     Hypertension     Multiple thyroid nodules 6/14/2023    Rhabdomyolysis        Past Surgical History:   Procedure Laterality Date    ECHOCARDIOGRAM,TRANSESOPHAGEAL N/A 9/21/2023    Procedure: Transesophageal echo (MARIA) intra-procedure log documentation;  Surgeon: Luisana Rodríguez MD;  Location: [facility] CATH LAB;  Service: Cardiology;  Laterality: N/A;    EYE SURGERY      TRANSESOPHAGEAL ECHOCARDIOGRAM WITH POSSIBLE CARDIOVERSION (MARIA W/ POSS CARDIOVERSION) N/A 1/5/2023    Procedure: Transesophageal echo (MARIA) intra-procedure log documentation;  Surgeon: Indra Foote MD;  Location: [facility] CATH LAB;  Service: Cardiology;  Laterality: N/A;    TRANSESOPHAGEAL ECHOCARDIOGRAPHY N/A 9/21/2023    Procedure: ECHOCARDIOGRAM, TRANSESOPHAGEAL;  Surgeon: Luisana Rodríguez MD;  Location: [facility] CATH LAB;  Service: Cardiology;  Laterality: N/A;    TREATMENT OF CARDIAC ARRHYTHMIA N/A 12/7/2020    Procedure: Cardioversion or Defibrillation;  Surgeon: Indra Foote MD;  Location: [facility] CATH LAB;  Service: Cardiology;  Laterality: N/A;    TREATMENT OF CARDIAC ARRHYTHMIA N/A 12/30/2020    Procedure: Cardioversion or Defibrillation;  Surgeon: Tyrone Steen MD;  Location: [facility] CATH LAB;  Service: Cardiology;  Laterality: N/A;    TREATMENT OF CARDIAC ARRHYTHMIA N/A 1/5/2023    Procedure: Cardioversion or Defibrillation;  Surgeon: Indra Foote MD;  Location: [facility] CATH LAB;  Service: Cardiology;  Laterality: N/A;    VASCULAR SURGERY         Review of patient's allergies indicates:  No Known Allergies    No current facility-administered medications on file prior to encounter.     Current Outpatient Medications on File Prior to Encounter   Medication Sig    albuterol (PROVENTIL/VENTOLIN HFA) 90 mcg/actuation inhaler  Inhale 2 puffs into the lungs every 6 (six) hours as needed for Wheezing or Shortness of Breath.    allopurinoL (ZYLOPRIM) 100 MG tablet Take 1 tablet (100 mg total) by mouth once daily.    amiodarone (PACERONE) 200 MG Tab Take 200 mg by mouth once daily.    ascorbic acid, vitamin C, (VITAMIN C) 500 MG tablet Take 500 mg by mouth 2 (two) times daily.    ELIQUIS 5 mg Tab Take 5 mg by mouth 2 (two) times daily.    ferrous sulfate (FEOSOL) 325 mg (65 mg iron) Tab tablet Take 325 mg by mouth once daily.    folic acid (FOLVITE) 1 MG tablet Take 1 mg by mouth once daily.    furosemide (LASIX) 80 MG tablet Take 1 tablet (80 mg total) by mouth 2 (two) times daily.    gabapentin (NEURONTIN) 100 MG capsule Take 1 capsule (100 mg total) by mouth 2 (two) times daily.    metOLazone (ZAROXOLYN) 5 MG tablet Take 1 tablet (5 mg total) by mouth once daily.    metoprolol succinate (TOPROL-XL) 25 MG 24 hr tablet Take 25 mg by mouth once daily.    multivitamin (ONE DAILY MULTIVITAMIN) per tablet Take 1 tablet by mouth once daily.    potassium chloride (MICRO-K) 10 MEQ CpSR Take 10 mEq by mouth once daily.    rosuvastatin (CRESTOR) 40 MG Tab Take 40 mg by mouth every evening.    senna-docusate 8.6-50 mg (PERICOLACE) 8.6-50 mg per tablet Take 2 tablets by mouth 2 (two) times daily as needed for Constipation.    tamsulosin (FLOMAX) 0.4 mg Cap Take 1 capsule (0.4 mg total) by mouth once daily.    insulin aspart U-100 (NOVOLOG) 100 unit/mL injection Inject into the skin. Per sliding scale     Family History       Problem Relation (Age of Onset)    Diabetes Mother, Father, Sister    Hypertension Mother, Father, Sister          Tobacco Use    Smoking status: Never    Smokeless tobacco: Never   Substance and Sexual Activity    Alcohol use: Not Currently     Alcohol/week: 6.0 standard drinks of alcohol     Types: 6 Cans of beer per week    Drug use: No    Sexual activity: Yes     Partners: Female     Review of Systems   Constitutional:   Negative for appetite change, chills, diaphoresis and fatigue.   HENT:  Negative for congestion, sore throat and tinnitus.    Eyes:  Negative for pain, discharge and itching.   Respiratory:  Negative for cough, chest tightness, shortness of breath and wheezing.    Cardiovascular:  Positive for palpitations and leg swelling. Negative for chest pain.   Gastrointestinal:  Negative for abdominal distention, abdominal pain, blood in stool, nausea and vomiting.   Endocrine: Negative for cold intolerance, heat intolerance and polydipsia.   Genitourinary:  Negative for difficulty urinating, dysuria, flank pain, frequency and hematuria.   Musculoskeletal:  Positive for joint swelling. Negative for arthralgias, back pain and myalgias.   Skin:  Positive for rash (generalized scaly rash). Negative for color change and pallor.   Neurological:  Positive for weakness. Negative for dizziness, seizures, facial asymmetry, light-headedness, numbness and headaches.   Psychiatric/Behavioral:  Negative for agitation, confusion and hallucinations.      Objective:     Vital Signs (Most Recent):  Temp: 98.4 °F (36.9 °C) (11/27/23 2321)  Pulse: (!) 54 (11/27/23 2321)  Resp: 17 (11/27/23 2321)  BP: (!) 140/64 (11/27/23 2321)  SpO2: 99 % (11/27/23 2321) Vital Signs (24h Range):  Temp:  [98.3 °F (36.8 °C)-98.5 °F (36.9 °C)] 98.4 °F (36.9 °C)  Pulse:  [51-63] 54  Resp:  [16-21] 17  SpO2:  [99 %-100 %] 99 %  BP: (124-148)/(56-69) 140/64     Weight: 96.6 kg (213 lb)  Body mass index is 29.71 kg/m².     Physical Exam  Constitutional:       General: He is not in acute distress.     Appearance: He is ill-appearing. He is not toxic-appearing.   HENT:      Head: Normocephalic and atraumatic.      Nose: Nose normal. No congestion or rhinorrhea.      Mouth/Throat:      Mouth: Mucous membranes are moist.   Eyes:      Extraocular Movements: Extraocular movements intact.      Conjunctiva/sclera: Conjunctivae normal.      Pupils: Pupils are equal, round, and  reactive to light.   Cardiovascular:      Rate and Rhythm: Regular rhythm. Bradycardia present.      Pulses: Normal pulses.      Heart sounds: Normal heart sounds.   Pulmonary:      Effort: Pulmonary effort is normal. No respiratory distress.      Breath sounds: Normal breath sounds. No stridor. No wheezing or rales.   Chest:      Chest wall: No tenderness.   Abdominal:      General: Abdomen is flat. Bowel sounds are normal. There is no distension.      Palpations: Abdomen is soft.      Tenderness: There is no abdominal tenderness. There is no right CVA tenderness, left CVA tenderness, guarding or rebound.   Musculoskeletal:         General: Swelling present. No tenderness.      Cervical back: Normal range of motion. No rigidity.      Right lower leg: Edema (+1) present.      Left lower leg: Edema (+2 pedal edema) present.   Skin:     General: Skin is warm and dry.      Coloration: Skin is not jaundiced.      Findings: Lesion and rash present.   Neurological:      General: No focal deficit present.      Mental Status: He is alert and oriented to person, place, and time. Mental status is at baseline.      Cranial Nerves: No cranial nerve deficit.      Sensory: No sensory deficit.   Psychiatric:         Mood and Affect: Mood normal.         Behavior: Behavior normal.         Thought Content: Thought content normal.              CRANIAL NERVES     CN III, IV, VI   Pupils are equal, round, and reactive to light.       Significant Labs: All pertinent labs within the past 24 hours have been reviewed.  A1C:   Recent Labs   Lab 06/02/23  1859 09/20/23  0446   HGBA1C 7.8* 5.5     Bilirubin:   Recent Labs   Lab 11/27/23  1509   BILITOT 0.5     Blood Culture:   Recent Labs   Lab 11/27/23  1512 11/27/23  1520   LABBLOO No Growth to date No Growth to date     CBC:   Recent Labs   Lab 11/27/23  1509   WBC 10.17   HGB 9.1*   HCT 30.8*        CMP:   Recent Labs   Lab 11/27/23  1509      K 3.0*   CL 94*   CO2 29   GLU  187*   BUN 51*   CREATININE 2.2*   CALCIUM 9.5   PROT 7.0   ALBUMIN 2.6*   BILITOT 0.5   ALKPHOS 100   AST 28   ALT 38   ANIONGAP 13     Cardiac Markers:   Recent Labs   Lab 11/27/23  1509   *     Coagulation:   Recent Labs   Lab 11/27/23  1509   INR 1.2     Lactic Acid:   Recent Labs   Lab 11/27/23  1509   LACTATE 0.8       TSH:   Recent Labs   Lab 09/20/23  0446   TSH 3.224     Urine Studies:   Recent Labs   Lab 11/27/23  1855   COLORU Orange*   APPEARANCEUA Cloudy*   PHUR 6.0   SPECGRAV 1.010   PROTEINUA 1+*   GLUCUA Negative   KETONESU Negative   BILIRUBINUA Negative   OCCULTUA 2+*   NITRITE Negative   UROBILINOGEN Negative   LEUKOCYTESUR 3+*   RBCUA 66*   WBCUA >100*   BACTERIA Moderate*   HYALINECASTS 17*       Significant Imaging: I have reviewed all pertinent imaging results/findings within the past 24 hours.  Imaging Results              X-Ray Chest AP Portable (Final result)  Result time 11/27/23 17:32:49      Final result by Jenna Owens MD (11/27/23 17:32:49)                   Impression:      Mild cardiomegaly.  Nonspecific prominence of the interstitium.  Probable bilateral pleural effusions.  The possibility of mild CHF cannot be entirely excluded.      Electronically signed by: Jenna Owens  Date:    11/27/2023  Time:    17:32               Narrative:    EXAMINATION:  AP PORTABLE CHEST    CLINICAL HISTORY:  pre op;    TECHNIQUE:  AP portable chest radiograph was submitted.    COMPARISON:  09/23/2023    FINDINGS:  AP portable chest radiograph demonstrates mild enlargement of the cardiac silhouette.  There is tortuosity of the descending thoracic aorta.  There is mild nonspecific prominence of the interstitium.  There is subtle blunting of the costophrenic angles, which may suggest small pleural effusions and/or pleural thickening.  There is no focal consolidation or pneumothorax.                                       X-Ray Foot Complete Left (Final result)  Result time 11/27/23  17:30:04      Final result by Jenna Owens MD (11/27/23 17:30:04)                   Impression:      No acute bony abnormality detected.  Degenerative changes of the midfoot and the 1st MTP.      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    FINDINGS:  Three views of the right foot demonstrate no acute fracture or dislocation.  Degenerative changes are seen involving the 1st metatarsophalangeal joint and the midfoot.  Small Achilles and moderate plantar spurring is noted.  The bones are diffusely osteopenic.  There is advanced vascular calcifications.    Three views of the left foot demonstrate acute fracture or dislocation.  Degenerative changes are seen involving the 1st metatarsophalangeal joint and the midfoot. Small Achilles and moderate plantar spurring is noted. The bones are diffusely osteopenic. There is advanced vascular calcifications.                                       X-Ray Foot Complete Right (Final result)  Result time 11/27/23 17:30:04      Final result by Jenna Owens MD (11/27/23 17:30:04)                   Impression:      No acute bony abnormality detected.  Degenerative changes of the midfoot and the 1st MTP.      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    FINDINGS:  Three views of the right foot demonstrate no acute fracture or dislocation.  Degenerative changes are seen  involving the 1st metatarsophalangeal joint and the midfoot.  Small Achilles and moderate plantar spurring is noted.  The bones are diffusely osteopenic.  There is advanced vascular calcifications.    Three views of the left foot demonstrate acute fracture or dislocation.  Degenerative changes are seen involving the 1st metatarsophalangeal joint and the midfoot. Small Achilles and moderate plantar spurring is noted. The bones are diffusely osteopenic. There is advanced vascular calcifications.                                       X-Ray Knee 1 or 2 View Left (Final result)  Result time 11/27/23 17:22:44      Final result by Jenna Owens MD (11/27/23 17:22:44)                   Impression:      No acute bony abnormality detected.  Large knee joint effusion.  Relatively stable degenerative changes.    Bone infarcts.      Electronically signed by: Jenna Owens  Date:    11/27/2023  Time:    17:22               Narrative:    EXAMINATION:  TWO VIEWS OF THE LEFT KNEE    CLINICAL HISTORY:  Effusion, left knee    TECHNIQUE:  AP and lateral view of the left knee    COMPARISON:  07/31/2023    FINDINGS:  Two views of the left knee demonstrate no acute fracture or dislocation.  There is narrowing and continued sclerosing of the medial knee joint compartment.  In addition, there is narrowing of the inferior aspect of the patellofemoral space.  There is mild patellar spurring.  There is a large suprapatellar effusion.  Again seen are amorphous sclerotic densities in the visualized femur and tibia, which are likely bone infarcts.  Bones are osteopenic.  Advanced vascular calcifications are present.

## 2023-11-28 NOTE — PHARMACY MED REC
"Admission Medication History     The home medication history was taken by Claudia Duarte.    You may go to "Admission" then "Reconcile Home Medications" tabs to review and/or act upon these items.     The home medication list has been updated by the Pharmacy department.   Please read ALL comments highlighted in yellow.   Please address this information as you see fit.    Feel free to contact us if you have any questions or require assistance.      Medications listed below were obtained from: Patient/family and Analytic software- [employer]  (Not in a hospital admission)        Claudia Duarte  [phone]                  .          "

## 2023-11-28 NOTE — ADMISSIONCARE
AdmissionCare    Guideline: Osteomyelitis, Inpatient    Based on the indications selected for the patient, the bed status of Admit to Inpatient was determined to be MET    The following indications were selected as present at the time of evaluation of the patient:      - Surgical intervention required (eg, bone or soft tissue debridement, removal of foreign body, revascularization procedure) that necessitates inpatient care. See Foot: Surgical Wound Care as appropriate.    AdmissionCare documentation entered by: Gerry Brantley    [facility], 27th edition, Copyright © 2023 [facility] [employer], [facility] All Rights Reserved.  [id number]:[id number]:00

## 2023-11-28 NOTE — ASSESSMENT & PLAN NOTE
Complained of urgency and straining at micturition  UA showed cloudy urine,with 3+ leukocyte esterase,rbc 66,wbc>100 and moderate bacteria  Currently symptoms has abaited, awaiting culture result  Will start antibiotics after bone biopsy this morning

## 2023-11-28 NOTE — ASSESSMENT & PLAN NOTE
History of DJD  Complained of knee pain  X-ray did not show osteomyelitis  For MRI after podiatrist/orthopedic surgeon review

## 2023-11-28 NOTE — NURSING
[facility] - [place], [place]  Nurses Note -- 4 Eyes      11/28/2023       Skin assessed on: Q Shift      [] No Pressure Injuries Present    []Prevention Measures Documented    [x] Yes LDA  for Pressure Injury Previously documented     [] Yes New Pressure Injury Discovered   [] LDA for New Pressure Injury Added      Attending RN:  Kassie Gray RN     Second RN:  Ace Dumont

## 2023-11-28 NOTE — ASSESSMENT & PLAN NOTE
Creatine stable for now. BMP reviewed- noted Estimated Creatinine Clearance: 35.4 mL/min (A) (based on SCr of 2.2 mg/dL (H)). according to latest data. Based on current GFR, CKD stage is stage 3 - GFR 30-59.    Monitor UOP and serial BMP and adjust therapy as needed. Renally dose meds.   Avoid nephrotoxic medications and procedures.

## 2023-11-28 NOTE — ASSESSMENT & PLAN NOTE
Patient's anemia is currently uncontrolled. Has not received any PRBCs to date. Etiology likely d/t Iron deficiency and chronic disease due to Chronic Kidney Disease/ESRD  Current CBC reviewed-   Lab Results   Component Value Date    HGB 9.1 (L) 11/27/2023    HCT 30.8 (L) 11/27/2023   Will obtain iron panel and ferritin  Monitor serial CBC and transfuse if patient becomes hemodynamically unstable, symptomatic or H/H drops below 7/21.

## 2023-11-28 NOTE — ASSESSMENT & PLAN NOTE
History of multiple decubitus ulcers, previous buttock ulcers now healed  Has bilateral heel wound and lat rt foot  Xray was negative for DVT,however acute phase reactants were elevated  CRP 57.8,Sed rate 83,with procalcitonin elevated at 6.09  Will hold off on starting antibiotics  Podiatrist consult will see him this morning for possible bone biopsy,wound debridement and starting antibiotics

## 2023-11-28 NOTE — CONSULTS
[place] - Med Surg  Podiatry  Consult Note    Patient Name: Chato Bowie  MRN: [id number]  Admission Date: 11/27/2023  Hospital Length of Stay: 1 days  Attending Physician: Jose L Evans III, MD  Primary Care Provider: Irlanda Valenzuela -     Inpatient consult to Podiatry  Consult performed by: Jenna Gutiérrez DPM  Consult ordered by: Abiel Hebert MD        Subjective:     History of Present Illness: 73 y /o male PMH DM2 Admitted for multiple wounds and left knee pain. Patient currently resides in NH reports wounds present x several months. Reports pain worse R>L. Heel protector boots present.     Scheduled Meds:   amiodarone  200 mg Oral Daily    [START ON 11/29/2023] aspirin  81 mg Oral Daily    atorvastatin  40 mg Oral QHS    ferrous sulfate  1 tablet Oral Daily    folic acid  1 mg Oral Daily    furosemide  40 mg Oral BID    insulin aspart U-100  2 Units Subcutaneous TIDWM    insulin detemir U-100  8 Units Subcutaneous BID    metOLazone  5 mg Oral Daily    metoprolol succinate  12.5 mg Oral Daily    tamsulosin  0.4 mg Oral Daily     Continuous Infusions:  PRN Meds:acetaminophen, acetaminophen, dextrose 10%, dextrose 10%, glucagon (human recombinant), glucose, glucose, HYDROcodone-acetaminophen, insulin aspart U-100, melatonin, naloxone, ondansetron, ondansetron, potassium bicarbonate, potassium bicarbonate, senna-docusate 8.6-50 mg, simethicone, sodium chloride 0.9%    Review of patient's allergies indicates:  No Known Allergies     Past Medical History:   Diagnosis Date    Acute congestive heart failure 3/20/2020    Alcohol abuse     Arthritis     Asthma attack 11/24/2021    Coronary artery disease     Diabetes mellitus     Hyperlipemia     Hypertension     Multiple thyroid nodules 6/14/2023    Rhabdomyolysis      Past Surgical History:   Procedure Laterality Date    ECHOCARDIOGRAM,TRANSESOPHAGEAL N/A 9/21/2023    Procedure: Transesophageal echo (MARIA) intra-procedure log documentation;  Surgeon:  Luisana Rodríguez MD;  Location: [facility] CATH LAB;  Service: Cardiology;  Laterality: N/A;    EYE SURGERY      TRANSESOPHAGEAL ECHOCARDIOGRAM WITH POSSIBLE CARDIOVERSION (MARIA W/ POSS CARDIOVERSION) N/A 1/5/2023    Procedure: Transesophageal echo (MARIA) intra-procedure log documentation;  Surgeon: Indra Foote MD;  Location: [facility] CATH LAB;  Service: Cardiology;  Laterality: N/A;    TRANSESOPHAGEAL ECHOCARDIOGRAPHY N/A 9/21/2023    Procedure: ECHOCARDIOGRAM, TRANSESOPHAGEAL;  Surgeon: Luisana Rodríguez MD;  Location: [facility] CATH LAB;  Service: Cardiology;  Laterality: N/A;    TREATMENT OF CARDIAC ARRHYTHMIA N/A 12/7/2020    Procedure: Cardioversion or Defibrillation;  Surgeon: Indra Foote MD;  Location: [facility] CATH LAB;  Service: Cardiology;  Laterality: N/A;    TREATMENT OF CARDIAC ARRHYTHMIA N/A 12/30/2020    Procedure: Cardioversion or Defibrillation;  Surgeon: Tyrone Steen MD;  Location: [facility] CATH LAB;  Service: Cardiology;  Laterality: N/A;    TREATMENT OF CARDIAC ARRHYTHMIA N/A 1/5/2023    Procedure: Cardioversion or Defibrillation;  Surgeon: Indra Foote MD;  Location: [facility] CATH LAB;  Service: Cardiology;  Laterality: N/A;    VASCULAR SURGERY         Family History       Problem Relation (Age of Onset)    Diabetes Mother, Father, Sister    Hypertension Mother, Father, Sister          Tobacco Use    Smoking status: Never    Smokeless tobacco: Never   Substance and Sexual Activity    Alcohol use: Not Currently     Alcohol/week: 6.0 standard drinks of alcohol     Types: 6 Cans of beer per week    Drug use: No    Sexual activity: Yes     Partners: Female     Review of Systems   Constitutional:  Positive for activity change.   Respiratory:  Negative for shortness of breath.    Cardiovascular:  Negative for chest pain and leg swelling.   Gastrointestinal:  Negative for nausea and vomiting.   Musculoskeletal:  Positive for arthralgias.   Skin:  Positive for wound.   Neurological:  Positive for  numbness. Negative for weakness.     Objective:     Vital Signs (Most Recent):  Temp: 98.6 °F (37 °C) (11/28/23 1554)  Pulse: 62 (11/28/23 1554)  Resp: 17 (11/28/23 1554)  BP: (!) 142/64 (11/28/23 1554)  SpO2: 98 % (11/28/23 1554) Vital Signs (24h Range):  Temp:  [97.5 °F (36.4 °C)-98.6 °F (37 °C)] 98.6 °F (37 °C)  Pulse:  [51-62] 62  Resp:  [16-21] 17  SpO2:  [98 %-100 %] 98 %  BP: (124-148)/(56-69) 142/64     Weight: 96.6 kg (213 lb)  Body mass index is 29.71 kg/m².    Foot Exam    General  Orientation: alert and oriented to person, place, and time       Right Foot/Ankle     Inspection and Palpation  Skin Exam: ulcer;     Neurovascular  Dorsalis pedis: 1+  Posterior tibial: 1+  Saphenous nerve sensation: diminished  Tibial nerve sensation: diminished  Superficial peroneal nerve sensation: diminished  Deep peroneal nerve sensation: diminished  Sural nerve sensation: diminished      Left Foot/Ankle      Inspection and Palpation  Skin Exam: ulcer;     Neurovascular  Dorsalis pedis: 1+  Posterior tibial: 1+  Saphenous nerve sensation: diminished  Tibial nerve sensation: diminished  Superficial peroneal nerve sensation: diminished  Deep peroneal nerve sensation: diminished  Sural nerve sensation: diminished      11/28/23:  Right lateral foot post debridement purulent drainage noted.       Left foot pre debridement       Stable eschar left       Stable eschar right           S/p debridement left foot.         Laboratory:  CBC:   Recent Labs   Lab 11/28/23  0520   WBC 8.63   RBC 3.97*   HGB 9.2*   HCT 30.5*      MCV 77*   MCH 23.2*   MCHC 30.2*     CMP:   Recent Labs   Lab 11/27/23  1509 11/28/23  0520   * 114*   CALCIUM 9.5 9.6   ALBUMIN 2.6*  --    PROT 7.0  --     138   K 3.0* 2.7*   CO2 29 31*   CL 94* 97   BUN 51* 48*   CREATININE 2.2* 1.9*   ALKPHOS 100  --    ALT 38  --    AST 28  --    BILITOT 0.5  --        Diagnostic Results:  X-Ray Foot Complete Right  Order: [id number]  Status: Final  result       Visible to patient: No (inaccessible in Patient Portal)       Next appt: 11/30/2023 at 11:00 AM in Interventional Radiology ([facility] IR1)       Dx: Foot ulcer    0 Result Notes  Details    Reading Physician Reading Date Result Priority   Jenna Owens MD  [phone] 11/27/2023 STAT     Narrative & Impression  EXAMINATION:  THREE VIEWS OF THE BILATERAL FEET     CLINICAL HISTORY:  Non-pressure chronic ulcer of other part of unspecified foot with unspecified severity     TECHNIQUE:  AP, lateral, and oblique view of the both feet     COMPARISON:  None.     FINDINGS:  Three views of the right foot demonstrate no acute fracture or dislocation.  Degenerative changes are seen involving the 1st metatarsophalangeal joint and the midfoot.  Small Achilles and moderate plantar spurring is noted.  The bones are diffusely osteopenic.  There is advanced vascular calcifications.     Three views of the left foot demonstrate acute fracture or dislocation.  Degenerative changes are seen involving the 1st metatarsophalangeal joint and the midfoot. Small Achilles and moderate plantar spurring is noted. The bones are diffusely osteopenic. There is advanced vascular calcifications.     Impression:     No acute bony abnormality detected.  Degenerative changes of the midfoot and the 1st MTP.        X-Ray Foot Complete Left  Order: [id number]  Status: Final result       Visible to patient: No (inaccessible in Patient Portal)       Next appt: 11/30/2023 at 11:00 AM in Interventional Radiology ([facility] IR1)       Dx: Foot ulcer    0 Result Notes  Details    Reading Physician Reading Date Result Priority   Jenna Owens MD  [phone] 11/27/2023      Narrative & Impression  EXAMINATION:  THREE VIEWS OF THE BILATERAL FEET     CLINICAL HISTORY:  Non-pressure chronic ulcer of other part of unspecified foot with unspecified severity     TECHNIQUE:  AP, lateral, and oblique view of the both feet     COMPARISON:  None.      FINDINGS:  Three views of the right foot demonstrate no acute fracture or dislocation.  Degenerative changes are seen involving the 1st metatarsophalangeal joint and the midfoot.  Small Achilles and moderate plantar spurring is noted.  The bones are diffusely osteopenic.  There is advanced vascular calcifications.     Three views of the left foot demonstrate acute fracture or dislocation.  Degenerative changes are seen involving the 1st metatarsophalangeal joint and the midfoot. Small Achilles and moderate plantar spurring is noted. The bones are diffusely osteopenic. There is advanced vascular calcifications.     Impression:     No acute bony abnormality detected.  Degenerative changes of the midfoot and the 1st MTP.          Clinical Findings:  B/L heel decubitus stable . Right lateral foot ulceration with purulent drainage.     Assessment/Plan:     Active Diagnoses:    Diagnosis Date Noted POA    Chronic diastolic heart failure [I50.32] 11/28/2023 Yes    Atherosclerosis of native artery of extremity [I70.209] 11/28/2023 Unknown    Multiple wounds [T07.XXXA] 07/28/2023 Yes     Chronic    DJD (degenerative joint disease) [M19.90] 06/14/2023 Yes    UTI (urinary tract infection) [N39.0] 09/05/2021 Yes    Paroxysmal atrial flutter [I48.92] 12/07/2020 Yes     Chronic    BPH (benign prostatic hyperplasia) [N40.0] 11/24/2020 Yes    Stage 3b chronic kidney disease (CKD) [N18.32] 11/24/2020 Yes    Left knee pain [M25.562] 03/23/2020 Yes    Alcohol abuse [F10.10] 02/14/2019 Yes    Type 2 diabetes mellitus with kidney complication, with long-term current use of insulin [E11.29, Z79.4] 03/10/2017 Not Applicable     Chronic    GERD (gastroesophageal reflux disease) [K21.9] 03/10/2017 Yes     Chronic    Essential hypertension [I10] 03/10/2017 Yes    Anemia [D64.9] 03/10/2017 Yes    Dyslipidemia [E78.5] 03/10/2017 Yes      Problems Resolved During this Admission:       MRI ordered    Arterial US ordered  Vascular surgery  consulted    Debridement: With verbal consent, nonviable tissues on the bilateral foot were debrided beyond sub q utilizing a  sterile No. 3 scalpel and forceps. Minimal bleeding controlled with direct pressure  The patient tolerated this well.     Culture obtained form right lateral foot ulceration purulent drainage noted. Flushed with saline. DSD applied B/L    Nursing orders placed for daily dressing changes.       Thank you for your consult. I will follow-up with patient. Please contact us if you have any additional questions.    Jenna Gutiérrez DPM  Podiatry  [facility] S[facility]

## 2023-11-28 NOTE — HPI
73-year-old  male,current a nursing home resident,who was brought to the ED for evaluation after receiving x-ray imaging obtained 5 days earlier with concern for possible osteomyelitis,prior to obtaining the knee and foot x-ray,he had complained of left knee pain,he also has bilateral heel decubitus ulcers,wound is covered with escar,and can not be staged,although it was reported that he has osteomyelitis but the xray obtained did not show evidence of osteo per report,pain in the knee is sharp,non radiating,could be 10 out 10 in severity some time,currently only able to move around in a wheel chair,he denied fever,chills or rigor,although reports straining at micturition and urgency but denied dysuria,frequency or hematuria.He denied chest pain,shortness of breath,or orthopnea,although he has chronic bilateral lower extremities swelling,worse in the left lower extremity compared to the right,he denied previous history of lower extremity deep vein thrombosis.He denied change in bowel habit,no diarrhea or constipation.  His medical history os significant for type 2 DM,hypertension,hyperlipidemia,heart failure,coronary artery disease, hyperlipidemia,alcohol abuse,CKD 3b and multiple thyroid nodules.labs obtained showed elevated acute phase reactant with sed rate 83,procalcitonin 6.09,crp 57.8,but x-ray did not show evidence, will need MRI to confirm early osteomyelitis.

## 2023-11-29 LAB
ANION GAP SERPL CALC-SCNC: 12 MMOL/L (ref 8–16)
BACTERIA UR CULT: ABNORMAL
BUN SERPL-MCNC: 48 MG/DL (ref 8–23)
CALCIUM SERPL-MCNC: 9.5 MG/DL (ref 8.7–10.5)
CHLORIDE SERPL-SCNC: 94 MMOL/L (ref 95–110)
CO2 SERPL-SCNC: 32 MMOL/L (ref 23–29)
CREAT SERPL-MCNC: 2.1 MG/DL (ref 0.5–1.4)
ERYTHROCYTE [DISTWIDTH] IN BLOOD BY AUTOMATED COUNT: 16.3 % (ref 11.5–14.5)
EST. GFR  (NO RACE VARIABLE): 33 ML/MIN/1.73 M^2
GLUCOSE SERPL-MCNC: 67 MG/DL (ref 70–110)
HCT VFR BLD AUTO: 28.4 % (ref 40–54)
HGB BLD-MCNC: 8.5 G/DL (ref 14–18)
LEFT ABI: 1.95
LEFT DORSALIS PEDIS: 255 MMHG
LEFT POSTERIOR TIBIAL: 255 MMHG
LEFT TBI: 0.62
LEFT TOE PRESSURE: 81 MMHG
MAGNESIUM SERPL-MCNC: 1.8 MG/DL (ref 1.6–2.6)
MCH RBC QN AUTO: 23 PG (ref 27–31)
MCHC RBC AUTO-ENTMCNC: 29.9 G/DL (ref 32–36)
MCV RBC AUTO: 77 FL (ref 82–98)
PLATELET # BLD AUTO: 264 K/UL (ref 150–450)
PMV BLD AUTO: 8.6 FL (ref 9.2–12.9)
POCT GLUCOSE: 102 MG/DL (ref 70–110)
POCT GLUCOSE: 123 MG/DL (ref 70–110)
POCT GLUCOSE: 80 MG/DL (ref 70–110)
POCT GLUCOSE: 84 MG/DL (ref 70–110)
POTASSIUM SERPL-SCNC: 2.8 MMOL/L (ref 3.5–5.1)
RBC # BLD AUTO: 3.7 M/UL (ref 4.6–6.2)
RIGHT ABI: 1.95
RIGHT ARM BP: 131 MMHG
RIGHT DORSALIS PEDIS: 255 MMHG
RIGHT POSTERIOR TIBIAL: 255 MMHG
RIGHT TBI: 1.18
RIGHT TOE PRESSURE: 154 MMHG
SODIUM SERPL-SCNC: 138 MMOL/L (ref 136–145)
WBC # BLD AUTO: 8.37 K/UL (ref 3.9–12.7)

## 2023-11-29 PROCEDURE — 11000001 HC ACUTE MED/SURG PRIVATE ROOM

## 2023-11-29 PROCEDURE — 25000003 PHARM REV CODE 250: Performed by: STUDENT IN AN ORGANIZED HEALTH CARE EDUCATION/TRAINING PROGRAM

## 2023-11-29 PROCEDURE — 63600175 PHARM REV CODE 636 W HCPCS: Performed by: STUDENT IN AN ORGANIZED HEALTH CARE EDUCATION/TRAINING PROGRAM

## 2023-11-29 PROCEDURE — 99233 PR SUBSEQUENT HOSPITAL CARE,LEVL III: ICD-10-PCS | Mod: ,,, | Performed by: SURGERY

## 2023-11-29 PROCEDURE — 83735 ASSAY OF MAGNESIUM: CPT | Performed by: STUDENT IN AN ORGANIZED HEALTH CARE EDUCATION/TRAINING PROGRAM

## 2023-11-29 PROCEDURE — 36415 COLL VENOUS BLD VENIPUNCTURE: CPT | Performed by: STUDENT IN AN ORGANIZED HEALTH CARE EDUCATION/TRAINING PROGRAM

## 2023-11-29 PROCEDURE — 80048 BASIC METABOLIC PNL TOTAL CA: CPT | Performed by: STUDENT IN AN ORGANIZED HEALTH CARE EDUCATION/TRAINING PROGRAM

## 2023-11-29 PROCEDURE — 85027 COMPLETE CBC AUTOMATED: CPT | Performed by: STUDENT IN AN ORGANIZED HEALTH CARE EDUCATION/TRAINING PROGRAM

## 2023-11-29 PROCEDURE — 99233 SBSQ HOSP IP/OBS HIGH 50: CPT | Mod: ,,, | Performed by: SURGERY

## 2023-11-29 PROCEDURE — 25000003 PHARM REV CODE 250: Performed by: SURGERY

## 2023-11-29 RX ORDER — HYDROMORPHONE HYDROCHLORIDE 1 MG/ML
0.5 INJECTION, SOLUTION INTRAMUSCULAR; INTRAVENOUS; SUBCUTANEOUS EVERY 6 HOURS PRN
Status: DISCONTINUED | OUTPATIENT
Start: 2023-11-29 | End: 2023-12-08 | Stop reason: HOSPADM

## 2023-11-29 RX ORDER — POTASSIUM CHLORIDE 20 MEQ/1
40 TABLET, EXTENDED RELEASE ORAL ONCE
Status: COMPLETED | OUTPATIENT
Start: 2023-11-29 | End: 2023-11-29

## 2023-11-29 RX ORDER — OXYCODONE AND ACETAMINOPHEN 10; 325 MG/1; MG/1
1 TABLET ORAL EVERY 4 HOURS PRN
Status: DISCONTINUED | OUTPATIENT
Start: 2023-11-29 | End: 2023-12-08 | Stop reason: HOSPADM

## 2023-11-29 RX ORDER — SODIUM CHLORIDE 9 MG/ML
INJECTION, SOLUTION INTRAVENOUS CONTINUOUS
Status: DISCONTINUED | OUTPATIENT
Start: 2023-11-29 | End: 2023-11-30

## 2023-11-29 RX ADMIN — INSULIN ASPART 2 UNITS: 100 INJECTION, SOLUTION INTRAVENOUS; SUBCUTANEOUS at 04:11

## 2023-11-29 RX ADMIN — AMIODARONE HYDROCHLORIDE 200 MG: 200 TABLET ORAL at 08:11

## 2023-11-29 RX ADMIN — TAMSULOSIN HYDROCHLORIDE 0.4 MG: 0.4 CAPSULE ORAL at 08:11

## 2023-11-29 RX ADMIN — CEFTRIAXONE 2 G: 2 INJECTION, POWDER, FOR SOLUTION INTRAMUSCULAR; INTRAVENOUS at 03:11

## 2023-11-29 RX ADMIN — SODIUM CHLORIDE: 9 INJECTION, SOLUTION INTRAVENOUS at 05:11

## 2023-11-29 RX ADMIN — FUROSEMIDE 40 MG: 40 TABLET ORAL at 08:11

## 2023-11-29 RX ADMIN — VANCOMYCIN HYDROCHLORIDE 2000 MG: 500 INJECTION, POWDER, LYOPHILIZED, FOR SOLUTION INTRAVENOUS at 03:11

## 2023-11-29 RX ADMIN — OXYCODONE HYDROCHLORIDE AND ACETAMINOPHEN 1 TABLET: 10; 325 TABLET ORAL at 04:11

## 2023-11-29 RX ADMIN — FERROUS SULFATE TAB 325 MG (65 MG ELEMENTAL FE) 1 EACH: 325 (65 FE) TAB at 08:11

## 2023-11-29 RX ADMIN — FUROSEMIDE 40 MG: 40 TABLET ORAL at 05:11

## 2023-11-29 RX ADMIN — ASPIRIN 81 MG CHEWABLE TABLET 81 MG: 81 TABLET CHEWABLE at 08:11

## 2023-11-29 RX ADMIN — METOPROLOL SUCCINATE 12.5 MG: 25 TABLET, EXTENDED RELEASE ORAL at 08:11

## 2023-11-29 RX ADMIN — INSULIN ASPART 2 UNITS: 100 INJECTION, SOLUTION INTRAVENOUS; SUBCUTANEOUS at 08:11

## 2023-11-29 RX ADMIN — METOLAZONE 5 MG: 5 TABLET ORAL at 08:11

## 2023-11-29 RX ADMIN — ATORVASTATIN CALCIUM 40 MG: 40 TABLET, FILM COATED ORAL at 09:11

## 2023-11-29 RX ADMIN — INSULIN DETEMIR 8 UNITS: 100 INJECTION, SOLUTION SUBCUTANEOUS at 08:11

## 2023-11-29 RX ADMIN — HYDROCODONE BITARTRATE AND ACETAMINOPHEN 1 TABLET: 5; 325 TABLET ORAL at 10:11

## 2023-11-29 RX ADMIN — INSULIN DETEMIR 8 UNITS: 100 INJECTION, SOLUTION SUBCUTANEOUS at 09:11

## 2023-11-29 RX ADMIN — FOLIC ACID 1 MG: 1 TABLET ORAL at 08:11

## 2023-11-29 RX ADMIN — POTASSIUM CHLORIDE 40 MEQ: 1500 TABLET, EXTENDED RELEASE ORAL at 09:11

## 2023-11-29 NOTE — CONSULTS
Pt seen/examined earlier around 6pm  72 y/o male with multiple medical problems admitted with chronic foot sores and B LE ischemia c/o recurrent left knee pain and effusion. He has had similar episodes in the past. No recent injury. Pain has increased over the last few days    PMHx- DM, CAD, CHF    VSS    Left knee- moderate effusion,+ TTP, mild warmth. ROM 5-35 degrees. No laxity. + pain ith ROM. No breaks in the skin at the knee. Pulses not palpable distally    WBC 8.6  Hct 30  ESR 83  CRP 57  Xrays- DJD. Bone infarcts    A/P Left Knee DJD/ effusion  Aspiration attempted without return of fluid except a few drops of blood  Doubt knee sepsis  Vasc plans for angio noted  Knee options discussed including MRI vs U/S guided aspiration vs ATS vs observation  Will cont with obs for now  Reassess in am

## 2023-11-29 NOTE — ASSESSMENT & PLAN NOTE
His of paroxysmal atrial fibrillation with multiple MARIA and cardiovesion  Currently on amiodarone and metoprolol 50xl  Cont apixan due to risk of thromboembolic stroke

## 2023-11-29 NOTE — SUBJECTIVE & OBJECTIVE
Interval History: Pt states his knee pain is improved and medication is helping. Pt denies fever, chills, n/v at this time.     Review of Systems  Objective:     Vital Signs (Most Recent):  Temp: 98.8 °F (37.1 °C) (11/29/23 0748)  Pulse: 62 (11/29/23 0748)  Resp: 19 (11/29/23 1017)  BP: (!) 123/58 (11/29/23 0748)  SpO2: 96 % (11/29/23 0748) Vital Signs (24h Range):  Temp:  [97.8 °F (36.6 °C)-99.5 °F (37.5 °C)] 98.8 °F (37.1 °C)  Pulse:  [59-62] 62  Resp:  [17-20] 19  SpO2:  [96 %-98 %] 96 %  BP: (118-142)/(56-64) 123/58     Weight: 96.6 kg (213 lb)  Body mass index is 29.71 kg/m².    Intake/Output Summary (Last 24 hours) at 11/29/2023 1217  Last data filed at 11/29/2023 0821  Gross per 24 hour   Intake 720 ml   Output 1250 ml   Net -530 ml         Physical Exam  Vitals reviewed.   Constitutional:       General: He is not in acute distress.  HENT:      Head: Normocephalic and atraumatic.      Mouth/Throat:      Mouth: Mucous membranes are moist.      Pharynx: Oropharynx is clear.   Cardiovascular:      Rate and Rhythm: Normal rate and regular rhythm.   Pulmonary:      Effort: Pulmonary effort is normal. No respiratory distress.   Abdominal:      General: There is no distension.      Palpations: Abdomen is soft.      Tenderness: There is no abdominal tenderness. There is no guarding or rebound.   Musculoskeletal:      Comments: Left knee with swelling, but no ttp noted. No erythema or warmth or focal induration noted.   Neurological:      General: No focal deficit present.      Mental Status: He is alert and oriented to person, place, and time.   Psychiatric:         Mood and Affect: Mood normal.         Behavior: Behavior normal.             Significant Labs: All pertinent labs within the past 24 hours have been reviewed.    Significant Imaging: I have reviewed all pertinent imaging results/findings within the past 24 hours.

## 2023-11-29 NOTE — ASSESSMENT & PLAN NOTE
History of multiple decubitus ulcers, previous buttock ulcers now healed  Has bilateral heel wound and lat rt foot  Xray was negative for DVT,however acute phase reactants were elevated  CRP 57.8,Sed rate 83,with procalcitonin elevated at 6.09  Will hold off on starting antibiotics  Podiatrist consult will see him this morning for possible bone biopsy,wound debridement and starting antibiotics  -wound cultures growing S. Aureus. Pending sensitivities. Started on vancomycin and ceftriaxone

## 2023-11-29 NOTE — ASSESSMENT & PLAN NOTE
Chronic, uncontrolled. Latest blood pressure and vitals reviewed-     Temp:  [97.8 °F (36.6 °C)-99.5 °F (37.5 °C)]   Pulse:  [59-62]   Resp:  [17-20]   BP: (118-142)/(56-64)   SpO2:  [96 %-98 %] .   Home meds for hypertension were reviewed and noted below.   Hypertension Medications               furosemide (LASIX) 80 MG tablet Take 1 tablet (80 mg total) by mouth 2 (two) times daily.    metOLazone (ZAROXOLYN) 5 MG tablet Take 1 tablet (5 mg total) by mouth once daily.    metoprolol succinate (TOPROL-XL) 25 MG 24 hr tablet Take 25 mg by mouth once daily.            While in the hospital, will manage blood pressure as follows; Continue home antihypertensive regimen    Will utilize p.r.n. blood pressure medication only if patient's blood pressure greater than 180/110 and he develops symptoms such as worsening chest pain or shortness of breath.

## 2023-11-29 NOTE — NURSING
[facility] - [place]  Nurses Note -- 4 Eyes      11/29/2023       Skin assessed on: Q Shift      [] No Pressure Injuries Present    []Prevention Measures Documented    [x] Yes LDA  for Pressure Injury Previously documented     [] Yes New Pressure Injury Discovered   [] LDA for New Pressure Injury Added      Attending RN:  Samantha Keith RN     Second RN:  JOYCE Joshua

## 2023-11-29 NOTE — PROGRESS NOTES
Pt reports slightly less pain in his left knee this am. No new Ortho complaints  VSS    Lt knee- mod effusion. Mild warmth. ROM 0-45 degrees. No laxity. Mild to mod TTP along the medial and lateral joint line. NV unchanged    WBC 8.37    A/P Left knee DJD  Symptoms are slightly improved. Rec observation and progression of activities  No intervention planned at this time. Pt appears to have chronic synovitis and effusion from his underlying DJD. May still consider U/S guided aspiration if symptoms do not continue to improve  Ok from Ortho standpoint to proceed with angio tomorrow

## 2023-11-29 NOTE — PROGRESS NOTES
Pharmacokinetic Initial Assessment: IV Vancomycin    Assessment/Plan:    Initiate intravenous vancomycin with loading dose of 2000 mg once followed by a maintenance dose of vancomycin 1250 mg IV every 24 hours  Desired empiric serum trough concentration is 10 to 20 mcg/mL  Draw vancomycin trough level 60 min prior to third dose on 12/1/23 at approximately 1500  Pharmacy will continue to follow and monitor vancomycin.      Please contact pharmacy at extension [phone] with any questions regarding this assessment.     Thank you for the consult,   Marilyn Hernandez       Patient brief summary:  Chato Bowie is a 73 y.o. male initiated on antimicrobial therapy with IV Vancomycin for treatment of suspected skin & soft tissue infection    Drug Allergies:   Review of patient's allergies indicates:  No Known Allergies    Actual Body Weight:   96.6 kg     Renal Function:   Estimated Creatinine Clearance: 37.1 mL/min (A) (based on SCr of 2.1 mg/dL (H)).,     Dialysis Method (if applicable):  N/A    CBC (last 72 hours):  Recent Labs   Lab Result Units 11/27/23  1509 11/28/23  0520 11/29/23  0437   WBC K/uL 10.17 8.63 8.37   Hemoglobin g/dL 9.1* 9.2* 8.5*   Hematocrit % 30.8* 30.5* 28.4*   Platelets K/uL 290 260 264   Gran % % 72.1  --   --    Lymph % % 11.7*  --   --    Mono % % 11.9  --   --    Eosinophil % % 3.1  --   --    Basophil % % 0.9  --   --    Differential Method  Automated  --   --        Metabolic Panel (last 72 hours):  Recent Labs   Lab Result Units 11/27/23  1509 11/27/23  1855 11/28/23  0520 11/28/23  1622 11/29/23  0437   Sodium mmol/L 136  --  138 136 138   Potassium mmol/L 3.0*  --  2.7* 3.2* 2.8*   Chloride mmol/L 94*  --  97 96 94*   CO2 mmol/L 29  --  31* 33* 32*   Glucose mg/dL 187*  --  114* 139* 67*   Glucose, UA   --  Negative  --   --   --    BUN mg/dL 51*  --  48* 51* 48*   Creatinine mg/dL 2.2*  --  1.9* 2.2* 2.1*   Albumin g/dL 2.6*  --   --   --   --    Total Bilirubin mg/dL 0.5  --   --   --   --   "  Alkaline Phosphatase U/L 100  --   --   --   --    AST U/L 28  --   --   --   --    ALT U/L 38  --   --   --   --    Magnesium mg/dL  --   --  2.0  --  1.8   Phosphorus mg/dL  --   --  2.9  --   --        Drug levels (last 3 results):  No results for input(s): "VANCOMYCINRA", "VANCORANDOM", "VANCOMYCINPE", "VANCOPEAK", "VANCOMYCINTR", "VANCOTROUGH" in the last 72 hours.    Microbiologic Results:  Microbiology Results (last 7 days)       Procedure Component Value Units Date/Time    Aerobic culture [[id number]]  (Abnormal) Collected: 11/28/23 1347    Order Status: Completed Specimen: Wound from Foot, Right Updated: 11/29/23 1009     Aerobic Bacterial Culture STAPHYLOCOCCUS AUREUS  Many  Susceptibility pending      Urine culture [[id number]]  (Abnormal)  (Susceptibility) Collected: 11/27/23 1855    Order Status: Completed Specimen: Urine Updated: 11/29/23 0812     Urine Culture, Routine PROTEUS MIRABILIS  10,000 - 49,999 cfu/ml      Narrative:      Specimen Source->Urine    Blood culture #2 [[id number]]  (Abnormal) Collected: 11/27/23 1512    Order Status: Completed Specimen: Blood from Peripheral, Antecubital, Left Updated: 11/29/23 0735     Blood Culture, Routine Gram stain aer bottle: Gram positive cocci in clusters resembling Staph      Results called to and read back by:Kathleen Gray-4 east 11/28/2023      13:53      COAGULASE-NEGATIVE STAPHYLOCOCCUS SPECIES  Organism is a probable contaminant      Narrative:      Blood Culture #2    Blood culture #1 [[id number]] Collected: 11/27/23 1520    Order Status: Completed Specimen: Blood from Peripheral, Antecubital, Left Updated: 11/28/23 1703     Blood Culture, Routine No Growth to date      No Growth to date    Narrative:      Blood Culture #1    Gram stain [[id number]] Collected: 11/28/23 1347    Order Status: Completed Specimen: Wound from Foot, Right Updated: 11/28/23 1502     Gram Stain Result Few WBC's      Many Gram positive cocci in pairs    Culture, " Anaerobe [[id number]] Collected: 11/28/23 1348    Order Status: Sent Specimen: Wound from Foot, Right Updated: 11/28/23 2379

## 2023-11-29 NOTE — ASSESSMENT & PLAN NOTE
History of chronic left knee pain  Xray showed effusion  Septic arthritis vs gout vs effusion  Knee tap attempted, but minimal fluid able to be collected. Ortho following, but feel this is unlikely septic and I agree.

## 2023-11-29 NOTE — ASSESSMENT & PLAN NOTE
Patient's FSGs are controlled on current medication regimen.  Last A1c reviewed-   Lab Results   Component Value Date    HGBA1C 5.5 09/20/2023     Most recent fingerstick glucose reviewed-   Recent Labs   Lab 11/28/23  1556 11/28/23 2119 11/29/23  0749   POCTGLUCOSE 143* 120* 80       Current correctional scale  Low  Low hemoglobin A1C likely due to worsening kidney functions  Antihyperglycemics (From admission, onward)    Start     Stop Route Frequency Ordered    11/28/23 0900  insulin detemir U-100 (Levemir) pen 8 Units         -- SubQ 2 times daily 11/28/23 0241    11/28/23 0715  insulin aspart U-100 pen 2 Units         -- SubQ 3 times daily with meals 11/28/23 0241 11/28/23 0334  insulin aspart U-100 pen 0-5 Units         -- SubQ Before meals & nightly PRN 11/28/23 0241        Hold Oral hypoglycemics while patient is in the hospital.  Will start basal,bolus and correctional insulin as needed

## 2023-11-29 NOTE — NURSING
Pt off the unit going to MRI  by bed via transport. IV access in place, saline locked. NAD or pain noted.

## 2023-11-29 NOTE — ASSESSMENT & PLAN NOTE
Complained of urgency and straining at micturition  UA showed cloudy urine,with 3+ leukocyte esterase,rbc 66,wbc>100 and moderate bacteria  Currently symptoms has abaited, awaiting culture result  -continue abx

## 2023-11-29 NOTE — NURSING
[facility]  Nurses Note -- 4 Eyes      11/28/2023       Skin assessed on: Q Shift      [] No Pressure Injuries Present    []Prevention Measures Documented    [x] Yes LDA  for Pressure Injury Previously documented     [] Yes New Pressure Injury Discovered   [] LDA for New Pressure Injury Added      Attending RN:  Josiane Dodge RN     Second RN:  JOYCE Fernando

## 2023-11-29 NOTE — PLAN OF CARE
Problem: Adult Inpatient Plan of Care  Goal: Plan of Care Review  Outcome: Ongoing, Progressing  Goal: Patient-Specific Goal (Individualized)  Outcome: Ongoing, Progressing  Goal: Absence of Hospital-Acquired Illness or Injury  Outcome: Ongoing, Progressing  Goal: Optimal Comfort and Wellbeing  Outcome: Ongoing, Progressing  Goal: Readiness for Transition of Care  Outcome: Ongoing, Progressing     Problem: Diabetes Comorbidity  Goal: Blood Glucose Level Within Targeted Range  Outcome: Ongoing, Progressing  Intervention: Monitor and Manage Glycemia  Flowsheets (Taken 11/29/2023 1525)  Glycemic Management: blood glucose monitored     Problem: Infection Progression (Sepsis/Septic Shock)  Goal: Absence of Infection Signs and Symptoms  Outcome: Ongoing, Progressing  Intervention: Initiate Sepsis Management  Flowsheets (Taken 11/29/2023 1525)  Infection Prevention: rest/sleep promoted  Infection Management: aseptic technique maintained  Isolation Precautions: protective  Intervention: Promote Recovery  Flowsheets (Taken 11/29/2023 1525)  Sleep/Rest Enhancement: regular sleep/rest pattern promoted  Activity Management: Rolling - L1     Problem: Impaired Wound Healing  Goal: Optimal Wound Healing  Outcome: Ongoing, Progressing  Intervention: Promote Wound Healing  Flowsheets (Taken 11/29/2023 1525)  Sleep/Rest Enhancement: regular sleep/rest pattern promoted  Activity Management: Rolling - L1  Pain Management Interventions:   around-the-clock dosing utilized   pain management plan reviewed with patient/caregiver   Pt A&Ox4, free from falls/injury, and able to make needs known during shift. VSS. Pt had c/o pain during shift. PRN pain medication given with relief. No acute distress noted. Bed locked and in lowest position. Bedside table and call light in reach. Will cont to monitor.

## 2023-11-29 NOTE — NURSING
Pt back to unit from MRI by bed via transport. IV access in place, saline locked. NAD or pain noted.

## 2023-11-29 NOTE — PROGRESS NOTES
[facility]  Vascular Surgery  Progress Note    Patient Name: Chato Bowie  MRN: [id number]  Admission Date: 11/27/2023  Primary Care Provider: Irlanda Valenzuela -    Subjective:     Interval History:  No acute changes patient remains hemodynamically stable no fever chills    Post-Op Info:  * No surgery found *          Medications:  Continuous Infusions:  Scheduled Meds:   amiodarone  200 mg Oral Daily    aspirin  81 mg Oral Daily    atorvastatin  40 mg Oral QHS    cefTRIAXone (ROCEPHIN) IVPB  2 g Intravenous Q24H    ferrous sulfate  1 tablet Oral Daily    folic acid  1 mg Oral Daily    furosemide  40 mg Oral BID    insulin aspart U-100  2 Units Subcutaneous TIDWM    insulin detemir U-100  8 Units Subcutaneous BID    metOLazone  5 mg Oral Daily    metoprolol succinate  12.5 mg Oral Daily    tamsulosin  0.4 mg Oral Daily    vancomycin (VANCOCIN) IV (PEDS and ADULTS)  20 mg/kg Intravenous Once     PRN Meds:acetaminophen, acetaminophen, cadexomer iodine, dextrose 10%, dextrose 10%, glucagon (human recombinant), glucose, glucose, HYDROcodone-acetaminophen, insulin aspart U-100, melatonin, naloxone, ondansetron, ondansetron, potassium bicarbonate, potassium bicarbonate, senna-docusate 8.6-50 mg, simethicone, sodium chloride 0.9%, Pharmacy to dose Vancomycin consult **AND** vancomycin - pharmacy to dose     Objective:     Vital Signs (Most Recent):  Temp: 98.8 °F (37.1 °C) (11/29/23 0748)  Pulse: 62 (11/29/23 0748)  Resp: 19 (11/29/23 1017)  BP: (!) 123/58 (11/29/23 0748)  SpO2: 96 % (11/29/23 0748) Vital Signs (24h Range):  Temp:  [97.8 °F (36.6 °C)-99.5 °F (37.5 °C)] 98.8 °F (37.1 °C)  Pulse:  [59-62] 62  Resp:  [17-20] 19  SpO2:  [96 %-98 %] 96 %  BP: (118-142)/(56-64) 123/58     Date 11/29/23 0700 - 11/30/23 0659   Shift 4265-8314 6851-7912 0216-5944 24 Hour Total   INTAKE   P.O. 240   240   Shift Total(mL/kg) 240(2.5)   240(2.5)   OUTPUT   Shift Total(mL/kg)       Weight (kg) 96.6 96.6 96.6 96.6  "      Physical Exam  Bilateral DP signals, biphasic on right monophasic on the left  Significant Labs:  eGFR: No results for input(s): "EGFRIFAFRICA", "EGFRCYSTC", "LABGLOM" in the last 48 hours.    Invalid input(s): "EGFRNON-AA"  Hemoglobin: No results for input(s): "HGBA1C" in the last 48 hours.  Lactic Acid: No results for input(s): "LACTATE" in the last 48 hours.  LFTs: No results for input(s): "ALT", "AST", "ALKPHOS", "BILITOT", "PROT", "ALBUMIN" in the last 48 hours.    Significant Diagnostics:  I have reviewed all pertinent imaging results/findings within the past 24 hours.    Assessment/Plan:     Active Diagnoses:    Diagnosis Date Noted POA    Chronic diastolic heart failure [I50.32] 11/28/2023 Yes    Atherosclerosis of native artery of extremity [I70.209] 11/28/2023 Yes    Multiple wounds [T07.XXXA] 07/28/2023 Yes     Chronic    DJD (degenerative joint disease) [M19.90] 06/14/2023 Yes    UTI (urinary tract infection) [N39.0] 09/05/2021 Yes    Paroxysmal atrial flutter [I48.92] 12/07/2020 Yes     Chronic    BPH (benign prostatic hyperplasia) [N40.0] 11/24/2020 Yes    Stage 3b chronic kidney disease (CKD) [N18.32] 11/24/2020 Yes    Left knee pain [M25.562] 03/23/2020 Yes    Alcohol abuse [F10.10] 02/14/2019 Yes    Type 2 diabetes mellitus with kidney complication, with long-term current use of insulin [E11.29, Z79.4] 03/10/2017 Not Applicable     Chronic    GERD (gastroesophageal reflux disease) [K21.9] 03/10/2017 Yes     Chronic    Essential hypertension [I10] 03/10/2017 Yes    Anemia [D64.9] 03/10/2017 Yes    Dyslipidemia [E78.5] 03/10/2017 Yes      Problems Resolved During this Admission:     73-year-old man with multiple comorbidities occluding CHF CKD and chronic limb-threatening ischemia with bilateral foot wounds nonhealing for over 3-4 months.  ABIs TBIs show noncompressible vessels and depressed waveforms more so on left than right leg.  Therefore will perform angiography from right femoral access " to potentially treat left leg 1st.  NPO midnight for angiogram tomorrow   Please start IV hydration  Osei Yeboah MD  Vascular Surgery  [facility] Surg

## 2023-11-30 ENCOUNTER — ANESTHESIA EVENT (OUTPATIENT)
Dept: SURGERY | Facility: HOSPITAL | Age: 73
DRG: 264 | End: 2023-11-30
Payer: MEDICARE

## 2023-11-30 PROBLEM — M19.90 DJD (DEGENERATIVE JOINT DISEASE): Status: RESOLVED | Noted: 2023-06-14 | Resolved: 2023-11-30

## 2023-11-30 PROBLEM — M86.9 OSTEOMYELITIS OF FOOT: Status: ACTIVE | Noted: 2023-11-30

## 2023-11-30 LAB
ANION GAP SERPL CALC-SCNC: 10 MMOL/L (ref 8–16)
BACTERIA SPEC AEROBE CULT: ABNORMAL
BACTERIA SPEC AEROBE CULT: ABNORMAL
BUN SERPL-MCNC: 48 MG/DL (ref 8–23)
CALCIUM SERPL-MCNC: 9.4 MG/DL (ref 8.7–10.5)
CHLORIDE SERPL-SCNC: 92 MMOL/L (ref 95–110)
CO2 SERPL-SCNC: 30 MMOL/L (ref 23–29)
CREAT SERPL-MCNC: 2.1 MG/DL (ref 0.5–1.4)
ERYTHROCYTE [DISTWIDTH] IN BLOOD BY AUTOMATED COUNT: 16.2 % (ref 11.5–14.5)
EST. GFR  (NO RACE VARIABLE): 33 ML/MIN/1.73 M^2
GLUCOSE SERPL-MCNC: 78 MG/DL (ref 70–110)
HCT VFR BLD AUTO: 29 % (ref 40–54)
HGB BLD-MCNC: 8.6 G/DL (ref 14–18)
MAGNESIUM SERPL-MCNC: 1.8 MG/DL (ref 1.6–2.6)
MCH RBC QN AUTO: 22.8 PG (ref 27–31)
MCHC RBC AUTO-ENTMCNC: 29.7 G/DL (ref 32–36)
MCV RBC AUTO: 77 FL (ref 82–98)
PLATELET # BLD AUTO: 266 K/UL (ref 150–450)
PMV BLD AUTO: 8.3 FL (ref 9.2–12.9)
POCT GLUCOSE: 104 MG/DL (ref 70–110)
POCT GLUCOSE: 130 MG/DL (ref 70–110)
POCT GLUCOSE: 132 MG/DL (ref 70–110)
POCT GLUCOSE: 151 MG/DL (ref 70–110)
POCT GLUCOSE: 69 MG/DL (ref 70–110)
POCT GLUCOSE: 90 MG/DL (ref 70–110)
POTASSIUM SERPL-SCNC: 2.8 MMOL/L (ref 3.5–5.1)
RBC # BLD AUTO: 3.78 M/UL (ref 4.6–6.2)
SODIUM SERPL-SCNC: 132 MMOL/L (ref 136–145)
WBC # BLD AUTO: 7.09 K/UL (ref 3.9–12.7)

## 2023-11-30 PROCEDURE — 36415 COLL VENOUS BLD VENIPUNCTURE: CPT | Performed by: STUDENT IN AN ORGANIZED HEALTH CARE EDUCATION/TRAINING PROGRAM

## 2023-11-30 PROCEDURE — 25000003 PHARM REV CODE 250: Performed by: STUDENT IN AN ORGANIZED HEALTH CARE EDUCATION/TRAINING PROGRAM

## 2023-11-30 PROCEDURE — 11000001 HC ACUTE MED/SURG PRIVATE ROOM

## 2023-11-30 PROCEDURE — 99222 1ST HOSP IP/OBS MODERATE 55: CPT | Mod: ,,, | Performed by: INTERNAL MEDICINE

## 2023-11-30 PROCEDURE — 80048 BASIC METABOLIC PNL TOTAL CA: CPT | Performed by: STUDENT IN AN ORGANIZED HEALTH CARE EDUCATION/TRAINING PROGRAM

## 2023-11-30 PROCEDURE — 25000003 PHARM REV CODE 250: Performed by: SURGERY

## 2023-11-30 PROCEDURE — 99222 PR INITIAL HOSPITAL CARE,LEVL II: ICD-10-PCS | Mod: ,,, | Performed by: INTERNAL MEDICINE

## 2023-11-30 PROCEDURE — 83735 ASSAY OF MAGNESIUM: CPT | Performed by: STUDENT IN AN ORGANIZED HEALTH CARE EDUCATION/TRAINING PROGRAM

## 2023-11-30 PROCEDURE — 85027 COMPLETE CBC AUTOMATED: CPT | Performed by: STUDENT IN AN ORGANIZED HEALTH CARE EDUCATION/TRAINING PROGRAM

## 2023-11-30 PROCEDURE — 63600175 PHARM REV CODE 636 W HCPCS: Performed by: STUDENT IN AN ORGANIZED HEALTH CARE EDUCATION/TRAINING PROGRAM

## 2023-11-30 PROCEDURE — 27000207 HC ISOLATION

## 2023-11-30 RX ORDER — POTASSIUM CHLORIDE 20 MEQ/1
40 TABLET, EXTENDED RELEASE ORAL 2 TIMES DAILY
Status: COMPLETED | OUTPATIENT
Start: 2023-11-30 | End: 2023-11-30

## 2023-11-30 RX ORDER — FUROSEMIDE 10 MG/ML
40 INJECTION INTRAMUSCULAR; INTRAVENOUS 2 TIMES DAILY
Status: DISCONTINUED | OUTPATIENT
Start: 2023-11-30 | End: 2023-12-02

## 2023-11-30 RX ADMIN — POTASSIUM CHLORIDE 40 MEQ: 1500 TABLET, EXTENDED RELEASE ORAL at 08:11

## 2023-11-30 RX ADMIN — Medication 6 MG: at 09:11

## 2023-11-30 RX ADMIN — AMIODARONE HYDROCHLORIDE 200 MG: 200 TABLET ORAL at 08:11

## 2023-11-30 RX ADMIN — METOLAZONE 5 MG: 5 TABLET ORAL at 08:11

## 2023-11-30 RX ADMIN — METOPROLOL SUCCINATE 12.5 MG: 25 TABLET, EXTENDED RELEASE ORAL at 08:11

## 2023-11-30 RX ADMIN — ASPIRIN 81 MG CHEWABLE TABLET 81 MG: 81 TABLET CHEWABLE at 08:11

## 2023-11-30 RX ADMIN — POTASSIUM CHLORIDE 40 MEQ: 1500 TABLET, EXTENDED RELEASE ORAL at 09:11

## 2023-11-30 RX ADMIN — HYDROCODONE BITARTRATE AND ACETAMINOPHEN 1 TABLET: 5; 325 TABLET ORAL at 03:11

## 2023-11-30 RX ADMIN — FUROSEMIDE 40 MG: 40 TABLET ORAL at 08:11

## 2023-11-30 RX ADMIN — CEFTRIAXONE 2 G: 2 INJECTION, POWDER, FOR SOLUTION INTRAMUSCULAR; INTRAVENOUS at 02:11

## 2023-11-30 RX ADMIN — FOLIC ACID 1 MG: 1 TABLET ORAL at 08:11

## 2023-11-30 RX ADMIN — FERROUS SULFATE TAB 325 MG (65 MG ELEMENTAL FE) 1 EACH: 325 (65 FE) TAB at 08:11

## 2023-11-30 RX ADMIN — FUROSEMIDE 40 MG: 10 INJECTION, SOLUTION INTRAVENOUS at 08:11

## 2023-11-30 RX ADMIN — INSULIN ASPART 2 UNITS: 100 INJECTION, SOLUTION INTRAVENOUS; SUBCUTANEOUS at 04:11

## 2023-11-30 RX ADMIN — OXYCODONE HYDROCHLORIDE AND ACETAMINOPHEN 1 TABLET: 10; 325 TABLET ORAL at 09:11

## 2023-11-30 RX ADMIN — TAMSULOSIN HYDROCHLORIDE 0.4 MG: 0.4 CAPSULE ORAL at 08:11

## 2023-11-30 RX ADMIN — VANCOMYCIN HYDROCHLORIDE 1250 MG: 1.25 INJECTION, POWDER, LYOPHILIZED, FOR SOLUTION INTRAVENOUS at 03:11

## 2023-11-30 RX ADMIN — DEXTROSE MONOHYDRATE 125 ML: 100 INJECTION, SOLUTION INTRAVENOUS at 07:11

## 2023-11-30 RX ADMIN — ATORVASTATIN CALCIUM 40 MG: 40 TABLET, FILM COATED ORAL at 08:11

## 2023-11-30 NOTE — ASSESSMENT & PLAN NOTE
Chronic, uncontrolled. Latest blood pressure and vitals reviewed-     Temp:  [97.5 °F (36.4 °C)-98.7 °F (37.1 °C)]   Pulse:  [52-63]   Resp:  [14-18]   BP: (117-152)/(56-68)   SpO2:  [96 %-98 %] .   Home meds for hypertension were reviewed and noted below.   Hypertension Medications               furosemide (LASIX) 80 MG tablet Take 1 tablet (80 mg total) by mouth 2 (two) times daily.    metOLazone (ZAROXOLYN) 5 MG tablet Take 1 tablet (5 mg total) by mouth once daily.    metoprolol succinate (TOPROL-XL) 25 MG 24 hr tablet Take 25 mg by mouth once daily.            While in the hospital, will manage blood pressure as follows; Continue home antihypertensive regimen    Will utilize p.r.n. blood pressure medication only if patient's blood pressure greater than 180/110 and he develops symptoms such as worsening chest pain or shortness of breath.

## 2023-11-30 NOTE — NURSING
Nohemy Win reports right foot Wound culture taken 11/28 resulted in MRSA. Dr. Evans notified. Contact precautions initiated.

## 2023-11-30 NOTE — PLAN OF CARE
Problem: Skin Injury Risk Increased  Goal: Skin Health and Integrity  Outcome: Ongoing, Progressing     Problem: Diabetes Comorbidity  Goal: Blood Glucose Level Within Targeted Range  Outcome: Ongoing, Progressing     Problem: Infection Progression (Sepsis/Septic Shock)  Goal: Absence of Infection Signs and Symptoms  Outcome: Ongoing, Progressing     Problem: Fluid and Electrolyte Imbalance (Acute Kidney Injury/Impairment)  Goal: Fluid and Electrolyte Balance  Outcome: Ongoing, Progressing     Problem: Impaired Wound Healing  Goal: Optimal Wound Healing  Outcome: Ongoing, Progressing

## 2023-11-30 NOTE — SUBJECTIVE & OBJECTIVE
Interval History: Pt states he is feeling fine. Pain controlled. Vascular did not feel intervention would be beneficial to the pt at this time. Podiatry following    Review of Systems  Objective:     Vital Signs (Most Recent):  Temp: 98.4 °F (36.9 °C) (11/30/23 1117)  Pulse: 63 (11/30/23 1117)  Resp: 18 (11/30/23 1117)  BP: (!) 117/56 (11/30/23 1117)  SpO2: 98 % (11/30/23 1117) Vital Signs (24h Range):  Temp:  [97.5 °F (36.4 °C)-98.7 °F (37.1 °C)] 98.4 °F (36.9 °C)  Pulse:  [52-63] 63  Resp:  [14-18] 18  SpO2:  [96 %-98 %] 98 %  BP: (117-152)/(56-68) 117/56     Weight: 96.6 kg (213 lb)  Body mass index is 29.71 kg/m².    Intake/Output Summary (Last 24 hours) at 11/30/2023 1149  Last data filed at 11/30/2023 1117  Gross per 24 hour   Intake 912.57 ml   Output 1800 ml   Net -887.43 ml         Physical Exam  Vitals reviewed.   Constitutional:       General: He is not in acute distress.     Appearance: He is not ill-appearing.   HENT:      Head: Normocephalic and atraumatic.      Mouth/Throat:      Mouth: Mucous membranes are moist.      Pharynx: Oropharynx is clear.   Eyes:      General: No scleral icterus.     Extraocular Movements: Extraocular movements intact.   Cardiovascular:      Rate and Rhythm: Normal rate.   Pulmonary:      Effort: Pulmonary effort is normal. No respiratory distress.   Abdominal:      General: There is no distension.      Palpations: Abdomen is soft.      Tenderness: There is no abdominal tenderness. There is no guarding or rebound.   Musculoskeletal:         General: Swelling (mild ble) present. No tenderness.      Cervical back: Neck supple. No rigidity.   Skin:     General: Skin is warm and dry.   Neurological:      General: No focal deficit present.      Mental Status: He is alert and oriented to person, place, and time.   Psychiatric:         Mood and Affect: Mood normal.         Behavior: Behavior normal.             Significant Labs: All pertinent labs within the past 24 hours have been  reviewed.    Significant Imaging: I have reviewed all pertinent imaging results/findings within the past 24 hours.

## 2023-11-30 NOTE — ANESTHESIA PREPROCEDURE EVALUATION
11/30/2023  Chato Bowie is a 73 y.o., male.    Past Medical History:   Diagnosis Date    Acute congestive heart failure 3/20/2020    Alcohol abuse     Arthritis     Asthma attack 11/24/2021    Coronary artery disease     Diabetes mellitus     Hyperlipemia     Hypertension     Multiple thyroid nodules 6/14/2023    Rhabdomyolysis        Pre-op Assessment    I have reviewed the Patient Summary Reports.     I have reviewed the Nursing Notes.       Review of Systems  Anesthesia Hx:  No problems with previous Anesthesia   Neg history of prior surgery.          Denies Family Hx of Anesthesia complications.    Denies Personal Hx of Anesthesia complications.                    Social:  Non-Smoker, No Alcohol Use       Hematology/Oncology:  Hematology Normal   Oncology Normal                                   EENT/Dental:  EENT/Dental Normal           Cardiovascular:  Exercise tolerance: good   Hypertension Valvular problems/Murmurs, AS Past MI CAD    Dysrhythmias atrial fibrillation  CHF        On apixaban    09/20/2023 Echo:  Left Ventricle: The left ventricle is normal in size. Normal wall thickness. Normal wall motion. There is normal systolic function with a visually estimated ejection fraction of 55 - 60%. Grade III diastolic dysfunction.  ·  Left Atrium: Left atrium is severely dilated.  ·  Right Ventricle: Moderate right ventricular enlargement. Systolic function is normal.  ·  Right Atrium: Right atrium is severely dilated.  ·  Aortic Valve: There is severe stenosis. Aortic valve area by VTI is 1.00 cm². Aortic valve peak velocity is 4.21 m/s. Mean gradient is 44 mmHg. The dimensionless index is 0.27. There is moderate aortic regurgitation.  ·  Mitral Valve: There is moderate regurgitation.  ·  Tricuspid Valve: There is moderate regurgitation.  ·  Pulmonary Artery: The estimated pulmonary artery systolic  pressure is 80 mmHg.  ·  IVC/SVC: Elevated venous pressure at 15 mmHg.  ·  1 cm x 1.4 cm mass seen in some views in [place] near the MV.. recommend MARIA if clinically indicated.                           Pulmonary:    Asthma     Pulmonary HTN               Renal/:  Chronic Renal Disease, CKD                Hepatic/GI:     GERD             Musculoskeletal:  Musculoskeletal Normal                Neurological:    Neuromuscular Disease,                                   Endocrine:  Diabetes, type 2           Dermatological:  Skin Normal    Psych:  Psychiatric Normal                  Lab Results   Component Value Date    WBC 7.09 11/30/2023    HGB 8.6 (L) 11/30/2023    HCT 29.0 (L) 11/30/2023    MCV 77 (L) 11/30/2023     11/30/2023       Chemistry        Component Value Date/Time     (L) 11/30/2023 0446    K 2.8 (L) 11/30/2023 0446    CL 92 (L) 11/30/2023 0446    CO2 30 (H) 11/30/2023 0446    BUN 48 (H) 11/30/2023 0446    CREATININE 2.1 (H) 11/30/2023 0446    GLU 78 11/30/2023 0446        Component Value Date/Time    CALCIUM 9.4 11/30/2023 0446    ALKPHOS 100 11/27/2023 1509    AST 28 11/27/2023 1509    ALT 38 11/27/2023 1509    BILITOT 0.5 11/27/2023 1509    ESTGFRAFRICA 40 (A) 05/14/2022 0525    EGFRNONAA 35 (A) 05/14/2022 0525            Physical Exam  General: Well nourished, Cooperative, Alert and Oriented    Airway:  Mallampati: III   Mouth Opening: Normal  TM Distance: 4 - 6 cm  Tongue: Normal    Dental:  Multiple missing and broken teeth top and bottom  Chest/Lungs:  Normal Respiratory Rate    Heart:  Rate: Normal      Wt Readings from Last 3 Encounters:   11/27/23 96.6 kg (213 lb)   09/24/23 95.2 kg (209 lb 14.1 oz)   09/22/23 103.9 kg (229 lb 0.9 oz)     Temp Readings from Last 3 Encounters:   12/01/23 36.3 °C (97.4 °F) (Oral)   09/27/23 36.4 °C (97.6 °F) (Oral)   09/23/23 36.7 °C (98.1 °F) (Oral)     BP Readings from Last 3 Encounters:   12/01/23 (!) 125/58   09/27/23 (!) 147/68   09/23/23 (!) 109/55      Pulse Readings from Last 3 Encounters:   12/01/23 (!) 59   09/27/23 60   09/23/23 (!) 59     Lab Results   Component Value Date    WBC 7.09 11/30/2023    HGB 8.6 (L) 11/30/2023    HCT 29.0 (L) 11/30/2023    MCV 77 (L) 11/30/2023     11/30/2023       CMP  Sodium   Date Value Ref Range Status   12/01/2023 134 (L) 136 - 145 mmol/L Final     Potassium   Date Value Ref Range Status   12/01/2023 3.1 (L) 3.5 - 5.1 mmol/L Final     Chloride   Date Value Ref Range Status   12/01/2023 90 (L) 95 - 110 mmol/L Final     CO2   Date Value Ref Range Status   12/01/2023 31 (H) 23 - 29 mmol/L Final     Glucose   Date Value Ref Range Status   12/01/2023 88 70 - 110 mg/dL Final     BUN   Date Value Ref Range Status   12/01/2023 48 (H) 8 - 23 mg/dL Final     Creatinine   Date Value Ref Range Status   12/01/2023 2.1 (H) 0.5 - 1.4 mg/dL Final     Calcium   Date Value Ref Range Status   12/01/2023 9.7 8.7 - 10.5 mg/dL Final     Total Protein   Date Value Ref Range Status   11/27/2023 7.0 6.0 - 8.4 g/dL Final     Albumin   Date Value Ref Range Status   11/27/2023 2.6 (L) 3.5 - 5.2 g/dL Final     Total Bilirubin   Date Value Ref Range Status   11/27/2023 0.5 0.1 - 1.0 mg/dL Final     Comment:     For infants and newborns, interpretation of results should be based  on gestational age, weight and in agreement with clinical  observations.    Premature Infant recommended reference ranges:  Up to 24 hours.............<8.0 mg/dL  Up to 48 hours............<12.0 mg/dL  3-5 days..................<15.0 mg/dL  6-29 days.................<15.0 mg/dL       Alkaline Phosphatase   Date Value Ref Range Status   11/27/2023 100 55 - 135 U/L Final     AST   Date Value Ref Range Status   11/27/2023 28 10 - 40 U/L Final     ALT   Date Value Ref Range Status   11/27/2023 38 10 - 44 U/L Final     Anion Gap   Date Value Ref Range Status   12/01/2023 13 8 - 16 mmol/L Final     eGFR   Date Value Ref Range Status   12/01/2023 33 (A) >60 mL/min/1.73 m^2  Final         Anesthesia Plan  Type of Anesthesia, risks & benefits discussed:    Anesthesia Type: Gen Natural Airway, MAC, Gen ETT, Gen Supraglottic Airway  Intra-op Monitoring Plan: Standard ASA Monitors  Post Op Pain Control Plan: multimodal analgesia and IV/PO Opioids PRN  Induction:  IV  Informed Consent: Informed consent signed with the Patient and all parties understand the risks and agree with anesthesia plan.  All questions answered.   ASA Score: 4  Day of Surgery Review of History & Physical: H&P Update referred to the surgeon/provider.    Ready For Surgery From Anesthesia Perspective.     .

## 2023-11-30 NOTE — ASSESSMENT & PLAN NOTE
Patient's FSGs are controlled on current medication regimen.  Last A1c reviewed-   Lab Results   Component Value Date    HGBA1C 5.5 09/20/2023     Most recent fingerstick glucose reviewed-   Recent Labs   Lab 11/29/23  1949 11/30/23  0726 11/30/23  0808 11/30/23  1117   POCTGLUCOSE 102 69* 132* 90       Current correctional scale  Low  Low hemoglobin A1C likely due to worsening kidney functions  Antihyperglycemics (From admission, onward)    Start     Stop Route Frequency Ordered    11/28/23 0900  insulin detemir U-100 (Levemir) pen 8 Units         -- SubQ 2 times daily 11/28/23 0241    11/28/23 0715  insulin aspart U-100 pen 2 Units         -- SubQ 3 times daily with meals 11/28/23 0241 11/28/23 0334  insulin aspart U-100 pen 0-5 Units         -- SubQ Before meals & nightly PRN 11/28/23 0241        Hold Oral hypoglycemics while patient is in the hospital.  Will start basal,bolus and correctional insulin as needed

## 2023-11-30 NOTE — PROGRESS NOTES
[facility] - Med Surg  Podiatry  Progress Note    Patient Name: Chato Bowie  MRN: [id number]  Admission Date: 11/27/2023  Hospital Length of Stay: 3 days  Attending Physician: Jose L Evans III, MD  Primary Care Provider: Irlanda Valenzuela -     Consults  Subjective:     History of Present Illness: 73 y /o male PMH DM2 Admitted for multiple wounds and left knee pain. Patient currently resides in NH reports wounds present x several months. Reports pain worse R>L. Heel protector boots present.     11/30/23: Patient seen bedside. Heel protector boots in place B/L. Angio scheduled for today with Dr. Yeboah however now canceled as vascular did not feel intervention would be beneficial to the pt at this time.     MRI revealing OM B/L calcaneus Left 5th metatarsal possible OM right 5th metatarsal.    Scheduled Meds:   amiodarone  200 mg Oral Daily    aspirin  81 mg Oral Daily    atorvastatin  40 mg Oral QHS    cefTRIAXone (ROCEPHIN) IVPB  2 g Intravenous Q24H    ferrous sulfate  1 tablet Oral Daily    folic acid  1 mg Oral Daily    furosemide (LASIX) injection  40 mg Intravenous BID    insulin aspart U-100  2 Units Subcutaneous TIDWM    insulin detemir U-100  8 Units Subcutaneous BID    metOLazone  5 mg Oral Daily    metoprolol succinate  12.5 mg Oral Daily    potassium chloride  40 mEq Oral BID    tamsulosin  0.4 mg Oral Daily    vancomycin (VANCOCIN) IV (PEDS and ADULTS)  1,250 mg Intravenous Q24H     Continuous Infusions:  PRN Meds:acetaminophen, acetaminophen, cadexomer iodine, dextrose 10%, dextrose 10%, glucagon (human recombinant), glucose, glucose, HYDROcodone-acetaminophen, HYDROmorphone, insulin aspart U-100, melatonin, naloxone, ondansetron, ondansetron, oxyCODONE-acetaminophen, potassium bicarbonate, potassium bicarbonate, senna-docusate 8.6-50 mg, simethicone, sodium chloride 0.9%, Pharmacy to dose Vancomycin consult **AND** vancomycin - pharmacy to dose    Review of patient's allergies indicates:  No  Known Allergies     Past Medical History:   Diagnosis Date    Acute congestive heart failure 3/20/2020    Alcohol abuse     Arthritis     Asthma attack 11/24/2021    Coronary artery disease     Diabetes mellitus     Hyperlipemia     Hypertension     Multiple thyroid nodules 6/14/2023    Rhabdomyolysis      Past Surgical History:   Procedure Laterality Date    ECHOCARDIOGRAM,TRANSESOPHAGEAL N/A 9/21/2023    Procedure: Transesophageal echo (MARIA) intra-procedure log documentation;  Surgeon: Luisana Rodríguez MD;  Location: [facility] CATH LAB;  Service: Cardiology;  Laterality: N/A;    EYE SURGERY      TRANSESOPHAGEAL ECHOCARDIOGRAM WITH POSSIBLE CARDIOVERSION (MARIA W/ POSS CARDIOVERSION) N/A 1/5/2023    Procedure: Transesophageal echo (MARIA) intra-procedure log documentation;  Surgeon: Indra Foote MD;  Location: [facility] CATH LAB;  Service: Cardiology;  Laterality: N/A;    TRANSESOPHAGEAL ECHOCARDIOGRAPHY N/A 9/21/2023    Procedure: ECHOCARDIOGRAM, TRANSESOPHAGEAL;  Surgeon: Luisana Rodríguez MD;  Location: [facility] CATH LAB;  Service: Cardiology;  Laterality: N/A;    TREATMENT OF CARDIAC ARRHYTHMIA N/A 12/7/2020    Procedure: Cardioversion or Defibrillation;  Surgeon: Indra Foote MD;  Location: [facility] CATH LAB;  Service: Cardiology;  Laterality: N/A;    TREATMENT OF CARDIAC ARRHYTHMIA N/A 12/30/2020    Procedure: Cardioversion or Defibrillation;  Surgeon: Tyrone Steen MD;  Location: [facility] CATH LAB;  Service: Cardiology;  Laterality: N/A;    TREATMENT OF CARDIAC ARRHYTHMIA N/A 1/5/2023    Procedure: Cardioversion or Defibrillation;  Surgeon: Indra Foote MD;  Location: [facility] CATH LAB;  Service: Cardiology;  Laterality: N/A;    VASCULAR SURGERY         Family History       Problem Relation (Age of Onset)    Diabetes Mother, Father, Sister    Hypertension Mother, Father, Sister          Tobacco Use    Smoking status: Never    Smokeless tobacco: Never   Substance and Sexual Activity    Alcohol use: Not Currently      Alcohol/week: 6.0 standard drinks of alcohol     Types: 6 Cans of beer per week    Drug use: No    Sexual activity: Yes     Partners: Female     Review of Systems   Constitutional:  Positive for activity change.   Respiratory:  Negative for shortness of breath.    Cardiovascular:  Negative for chest pain and leg swelling.   Gastrointestinal:  Negative for nausea and vomiting.   Musculoskeletal:  Positive for arthralgias.   Skin:  Positive for wound.   Neurological:  Positive for numbness. Negative for weakness.     Objective:     Vital Signs (Most Recent):  Temp: 98.4 °F (36.9 °C) (11/30/23 1117)  Pulse: 63 (11/30/23 1117)  Resp: 18 (11/30/23 1117)  BP: (!) 117/56 (11/30/23 1117)  SpO2: 98 % (11/30/23 1117) Vital Signs (24h Range):  Temp:  [97.6 °F (36.4 °C)-98.7 °F (37.1 °C)] 98.4 °F (36.9 °C)  Pulse:  [57-63] 63  Resp:  [14-18] 18  SpO2:  [96 %-98 %] 98 %  BP: (117-152)/(56-68) 117/56     Weight: 96.6 kg (213 lb)  Body mass index is 29.71 kg/m².    Foot Exam    General  Orientation: alert and oriented to person, place, and time       Right Foot/Ankle     Inspection and Palpation  Skin Exam: ulcer;     Neurovascular  Dorsalis pedis: 1+  Posterior tibial: 1+  Saphenous nerve sensation: diminished  Tibial nerve sensation: diminished  Superficial peroneal nerve sensation: diminished  Deep peroneal nerve sensation: diminished  Sural nerve sensation: diminished      Left Foot/Ankle      Inspection and Palpation  Skin Exam: ulcer;     Neurovascular  Dorsalis pedis: 1+  Posterior tibial: 1+  Saphenous nerve sensation: diminished  Tibial nerve sensation: diminished  Superficial peroneal nerve sensation: diminished  Deep peroneal nerve sensation: diminished  Sural nerve sensation: diminished        11/30/23:    Right heel       Right lateral foot       Left foot       Left lateral foot          11/28/23:  Right lateral foot post debridement purulent drainage noted.       Left foot pre debridement       Stable eschar left        Stable eschar right           S/p debridement left foot.         Laboratory:  CBC:   Recent Labs   Lab 11/30/23  0446   WBC 7.09   RBC 3.78*   HGB 8.6*   HCT 29.0*      MCV 77*   MCH 22.8*   MCHC 29.7*       CMP:   Recent Labs   Lab 11/27/23  1509 11/28/23  0520 11/30/23  0446   *   < > 78   CALCIUM 9.5   < > 9.4   ALBUMIN 2.6*  --   --    PROT 7.0  --   --       < > 132*   K 3.0*   < > 2.8*   CO2 29   < > 30*   CL 94*   < > 92*   BUN 51*   < > 48*   CREATININE 2.2*   < > 2.1*   ALKPHOS 100  --   --    ALT 38  --   --    AST 28  --   --    BILITOT 0.5  --   --     < > = values in this interval not displayed.         Diagnostic Results:  X-Ray Foot Complete Right  Order: [id number]  Status: Final result       Visible to patient: No (inaccessible in Patient Portal)       Next appt: 11/30/2023 at 11:00 AM in Interventional Radiology ([facility] IR1)       Dx: Foot ulcer    0 Result Notes  Details    Reading Physician Reading Date Result Priority   Jenna Owens MD  [phone] 11/27/2023 STAT     Narrative & Impression  EXAMINATION:  THREE VIEWS OF THE BILATERAL FEET     CLINICAL HISTORY:  Non-pressure chronic ulcer of other part of unspecified foot with unspecified severity     TECHNIQUE:  AP, lateral, and oblique view of the both feet     COMPARISON:  None.     FINDINGS:  Three views of the right foot demonstrate no acute fracture or dislocation.  Degenerative changes are seen involving the 1st metatarsophalangeal joint and the midfoot.  Small Achilles and moderate plantar spurring is noted.  The bones are diffusely osteopenic.  There is advanced vascular calcifications.     Three views of the left foot demonstrate acute fracture or dislocation.  Degenerative changes are seen involving the 1st metatarsophalangeal joint and the midfoot. Small Achilles and moderate plantar spurring is noted. The bones are diffusely osteopenic. There is advanced vascular calcifications.     Impression:     No  acute bony abnormality detected.  Degenerative changes of the midfoot and the 1st MTP.        X-Ray Foot Complete Left  Order: [id number]  Status: Final result       Visible to patient: No (inaccessible in Patient Portal)       Next appt: 11/30/2023 at 11:00 AM in Interventional Radiology ([facility] IR[facility])       Dx: Foot ulcer    0 Result Notes  Details    Reading Physician Reading Date Result Priority   Jenna Owens MD  [phone] 11/27/2023      Narrative & Impression  EXAMINATION:  THREE VIEWS OF THE BILATERAL FEET     CLINICAL HISTORY:  Non-pressure chronic ulcer of other part of unspecified foot with unspecified severity     TECHNIQUE:  AP, lateral, and oblique view of the both feet     COMPARISON:  None.     FINDINGS:  Three views of the right foot demonstrate no acute fracture or dislocation.  Degenerative changes are seen involving the 1st metatarsophalangeal joint and the midfoot.  Small Achilles and moderate plantar spurring is noted.  The bones are diffusely osteopenic.  There is advanced vascular calcifications.     Three views of the left foot demonstrate acute fracture or dislocation.  Degenerative changes are seen involving the 1st metatarsophalangeal joint and the midfoot. Small Achilles and moderate plantar spurring is noted. The bones are diffusely osteopenic. There is advanced vascular calcifications.     Impression:     No acute bony abnormality detected.  Degenerative changes of the midfoot and the 1st MTP.        MRI : MRI Foot (Hindfoot) Left Without Contrast  Order: [id number]  Status: Final result       Visible to patient: No (inaccessible in Patient Portal)       Next appt: 12/14/2023 at 02:20 PM in Urology (Amirah Schneider MD)       Dx: Foot ulcer    0 Result Notes  Details    Reading Physician Reading Date Result Priority   Desmond Najera MD  [phone] 11/29/2023      Narrative & Impression  EXAMINATION:  MRI FOOT (FOREFOOT) LEFT WITHOUT CONTRAST; MRI FOOT (HINDFOOT) LEFT  WITHOUT CONTRAST; MRI FOOT (HINDFOOT) RIGHT WITHOUT CONTRAST; MRI FOOT (FOREFOOT) RIGHT WITHOUT CONTRAST     CLINICAL HISTORY:  Foot swelling, diabetic, osteomyelitis suspected, xray done;r/o Left lateral foot ulceration;; Foot swelling, diabetic, osteomyelitis suspected, xray done;Left heel decubitus r/o OM;; Foot swelling, diabetic, osteomyelitis suspected, xray done;r/o OM right heel;; Foot swelling, diabetic, osteomyelitis suspected, xray done;r/o OM, abscess right lateral foot.;  Non-pressure chronic ulcer of other part of unspecified foot with unspecified severity     TECHNIQUE:  MRI of the right forefoot and hindfoot without contrast.     MRI of the left forefoot and hindfoot without contrast     COMPARISON:  Radiographs dated 11/27/2023     FINDINGS:  RIGHT:     There is ulceration over the 5th metatarsal head with subjacent bone marrow edema.  No geographic marrow replacement or definite erosion.  If ulcer probes to bone, appearance may be seen with early osteomyelitis.     There is large ulceration over the posterolateral calcaneus.  There is bone marrow edema and mild T1 hypointensity of the subjacent calcaneus in keeping with early osteomyelitis.     No fracture.  No evidence for diffuse marrow infiltrative process.  Ankle tendons and ligaments are unremarkable.  There is thickening of the plantar fascia central cord in keeping with plantar fasciitis.  There is moderate to severe atrophy of intrinsic foot musculature.  There is generalized subcutaneous edema.     LEFT:     There is ulceration over the 5th metatarsal head with subjacent bone marrow edema and mild T1 hypointensity in keeping with early osteomyelitis.     There is large ulceration over the posterolateral calcaneus.  There is bone marrow edema and mild T1 hypointensity of the subjacent calcaneus in keeping with early osteomyelitis.     Note made of subchondral bone marrow edema at the 4th and 5th TMT joint, likely reactive.     No fracture.   No evidence for diffuse marrow infiltrative process.  There is a longitudinal split tear of the peroneus brevis tendon.  There is thickening of the plantar fascia central cord in keeping with plantar fasciitis.  There is moderate to severe atrophy of intrinsic foot musculature.  There is generalized subcutaneous edema.     Impression:     1. Osteomyelitis of the bilateral posterolateral calcaneus.  2. Osteomyelitis of the left 5th metatarsal head and possibly right 5th metatarsal head.  3. Additional findings above.        Clinical Findings:  B/L heel decubitus stable .   Assessment/Plan:     Active Diagnoses:    Diagnosis Date Noted POA    Chronic diastolic heart failure [I50.32] 11/28/2023 Yes    Atherosclerosis of native artery of extremity [I70.209] 11/28/2023 Yes    Multiple wounds [T07.XXXA] 07/28/2023 Yes     Chronic    UTI (urinary tract infection) [N39.0] 09/05/2021 Yes    Paroxysmal atrial flutter [I48.92] 12/07/2020 Yes     Chronic    BPH (benign prostatic hyperplasia) [N40.0] 11/24/2020 Yes    Stage 3b chronic kidney disease (CKD) [N18.32] 11/24/2020 Yes    Left knee pain [M25.562] 03/23/2020 Yes    Alcohol abuse [F10.10] 02/14/2019 Yes    Type 2 diabetes mellitus with kidney complication, with long-term current use of insulin [E11.29, Z79.4] 03/10/2017 Not Applicable     Chronic    GERD (gastroesophageal reflux disease) [K21.9] 03/10/2017 Yes     Chronic    Essential hypertension [I10] 03/10/2017 Yes    Anemia [D64.9] 03/10/2017 Yes    Dyslipidemia [E78.5] 03/10/2017 Yes      Problems Resolved During this Admission:    Diagnosis Date Noted Date Resolved POA    DJD (degenerative joint disease) [M19.90] 06/14/2023 11/30/2023 Yes       MRI revealing OM B/L calcaneus Left 5th metatarsal possible OM right 5th metatarsal.    Discussed two options with patient. Proximal amputation  vs  debridement, IV abx for six weeks with aggressive wound care for several months with no guarantee of wound resolution.   Patient would like to try wound care, IV abx    Plan for OR debridement, bone biopsy tomorrow. Case request placed    NPO @ midnight    Vascular surgery consulted- angio scheduled for today canceled, no plans for intervention per Dr. Yeboah.     Abx per ID    Nursing orders placed for daily dressing changes.     DSD applied.   Thank you for your consult. I will follow-up with patient. Please contact us if you have any additional questions.    Jenna Gutiérrez DPM  Podiatry  [facility] [place] - Med Surg

## 2023-11-30 NOTE — PROGRESS NOTES
[facility] Medicine  Progress Note    Patient Name: Chato Bowie  MRN: [id number]  Patient Class: IP- Inpatient   Admission Date: 11/27/2023  Length of Stay: 3 days  Attending Physician: Jose L Evans III, MD  Primary Care Provider: Irlanda Valenzuela -        Subjective:     Principal Problem:<principal problem not specified>        HPI:  73-year-old  male,current a nursing home resident,who was brought to the ED for evaluation after receiving x-ray imaging obtained 5 days earlier with concern for possible osteomyelitis,prior to obtaining the knee and foot x-ray,he had complained of left knee pain,he also has bilateral heel decubitus ulcers,wound is covered with escar,and can not be staged,although it was reported that he has osteomyelitis but the xray obtained did not show evidence of osteo per report,pain in the knee is sharp,non radiating,could be 10 out 10 in severity some time,currently only able to move around in a wheel chair,he denied fever,chills or rigor,although reports straining at micturition and urgency but denied dysuria,frequency or hematuria.He denied chest pain,shortness of breath,or orthopnea,although he has chronic bilateral lower extremities swelling,worse in the left lower extremity compared to the right,he denied previous history of lower extremity deep vein thrombosis.He denied change in bowel habit,no diarrhea or constipation.  His medical history os significant for type 2 DM,hypertension,hyperlipidemia,heart failure,coronary artery disease, hyperlipidemia,alcohol abuse,CKD 3b and multiple thyroid nodules.labs obtained showed elevated acute phase reactant with sed rate 83,procalcitonin 6.09,crp 57.8,but x-ray did not show evidence, will need MRI to confirm early osteomyelitis.    Overview/Hospital Course:  No notes on file    Interval History: Pt states he is feeling fine. Pain controlled. Vascular did not feel intervention would be beneficial to the pt  at this time. Podiatry following    Review of Systems  Objective:     Vital Signs (Most Recent):  Temp: 98.4 °F (36.9 °C) (11/30/23 1117)  Pulse: 63 (11/30/23 1117)  Resp: 18 (11/30/23 1117)  BP: (!) 117/56 (11/30/23 1117)  SpO2: 98 % (11/30/23 1117) Vital Signs (24h Range):  Temp:  [97.5 °F (36.4 °C)-98.7 °F (37.1 °C)] 98.4 °F (36.9 °C)  Pulse:  [52-63] 63  Resp:  [14-18] 18  SpO2:  [96 %-98 %] 98 %  BP: (117-152)/(56-68) 117/56     Weight: 96.6 kg (213 lb)  Body mass index is 29.71 kg/m².    Intake/Output Summary (Last 24 hours) at 11/30/2023 1149  Last data filed at 11/30/2023 1117  Gross per 24 hour   Intake 912.57 ml   Output 1800 ml   Net -887.43 ml         Physical Exam  Vitals reviewed.   Constitutional:       General: He is not in acute distress.     Appearance: He is not ill-appearing.   HENT:      Head: Normocephalic and atraumatic.      Mouth/Throat:      Mouth: Mucous membranes are moist.      Pharynx: Oropharynx is clear.   Eyes:      General: No scleral icterus.     Extraocular Movements: Extraocular movements intact.   Cardiovascular:      Rate and Rhythm: Normal rate.   Pulmonary:      Effort: Pulmonary effort is normal. No respiratory distress.   Abdominal:      General: There is no distension.      Palpations: Abdomen is soft.      Tenderness: There is no abdominal tenderness. There is no guarding or rebound.   Musculoskeletal:         General: Swelling (mild ble) present. No tenderness.      Cervical back: Neck supple. No rigidity.   Skin:     General: Skin is warm and dry.   Neurological:      General: No focal deficit present.      Mental Status: He is alert and oriented to person, place, and time.   Psychiatric:         Mood and Affect: Mood normal.         Behavior: Behavior normal.             Significant Labs: All pertinent labs within the past 24 hours have been reviewed.    Significant Imaging: I have reviewed all pertinent imaging results/findings within the past 24  hours.    Assessment/Plan:      Atherosclerosis of native artery of extremity  Vascular plan for angiography      Chronic diastolic heart failure  History of grade 3 diastolic dysfunction  Does not seem to be in acute diastolic heart failure exacerbation  Will continue twice daily furosemide, with metolazone        Multiple wounds  History of multiple decubitus ulcers, previous buttock ulcers now healed  Has bilateral heel wound and lat rt foot  Xray was negative for DVT,however acute phase reactants were elevated  CRP 57.8,Sed rate 83,with procalcitonin elevated at 6.09  Will hold off on starting antibiotics  Podiatrist consult will see him this morning for possible bone biopsy,wound debridement and starting antibiotics  -wound cultures growing MRSA.n Started on vancomycin and ceftriaxone    UTI (urinary tract infection)  Complained of urgency and straining at micturition  UA showed cloudy urine,with 3+ leukocyte esterase,rbc 66,wbc>100 and moderate bacteria  Currently symptoms has abaited, proteus growing in urine with ceftriaxone sensitive.  -continue abx    Paroxysmal atrial flutter  His of paroxysmal atrial fibrillation with multiple MARIA and cardiovesion  Currently on amiodarone and metoprolol 50xl  Cont apixan due to risk of thromboembolic stroke      BPH (benign prostatic hyperplasia)  Stable  Will consider tamsulosin for symptomatic treatment      Stage 3b chronic kidney disease (CKD)  Creatine stable for now. BMP reviewed- noted Estimated Creatinine Clearance: 37.1 mL/min (A) (based on SCr of 2.1 mg/dL (H)). according to latest data. Based on current GFR, CKD stage is stage 3 - GFR 30-59.    Monitor UOP and serial BMP and adjust therapy as needed. Renally dose meds.   Avoid nephrotoxic medications and procedures.    Left knee pain  History of chronic left knee pain  Xray showed effusion  Septic arthritis vs gout vs effusion  Knee tap attempted, but minimal fluid able to be collected. Ortho following, but  feel this is unlikely septic and I agree.      Alcohol abuse  History of alcohol use  Now in remission, has not had a drink in the recent past  Not at risk of withdrawal  Cont multivitamin,thiamine and folic acid      GERD (gastroesophageal reflux disease)  Stable    Anemia  Patient's anemia is currently uncontrolled. Has not received any PRBCs to date. Etiology likely d/t Iron deficiency and chronic disease due to Chronic Kidney Disease/ESRD  Current CBC reviewed-   Lab Results   Component Value Date    HGB 8.6 (L) 11/30/2023    HCT 29.0 (L) 11/30/2023   Will obtain iron panel and ferritin  Monitor serial CBC and transfuse if patient becomes hemodynamically unstable, symptomatic or H/H drops below 7/21.    Type 2 diabetes mellitus with kidney complication, with long-term current use of insulin  Patient's FSGs are controlled on current medication regimen.  Last A1c reviewed-   Lab Results   Component Value Date    HGBA1C 5.5 09/20/2023     Most recent fingerstick glucose reviewed-   Recent Labs   Lab 11/29/23  1949 11/30/23  0726 11/30/23  0808 11/30/23  1117   POCTGLUCOSE 102 69* 132* 90       Current correctional scale  Low  Low hemoglobin A1C likely due to worsening kidney functions  Antihyperglycemics (From admission, onward)      Start     Stop Route Frequency Ordered    11/28/23 0900  insulin detemir U-100 (Levemir) pen 8 Units         -- SubQ 2 times daily 11/28/23 0241    11/28/23 0715  insulin aspart U-100 pen 2 Units         -- SubQ 3 times daily with meals 11/28/23 0241    11/28/23 0334  insulin aspart U-100 pen 0-5 Units         -- SubQ Before meals & nightly PRN 11/28/23 0241          Hold Oral hypoglycemics while patient is in the hospital.  Will start basal,bolus and correctional insulin as needed    Dyslipidemia  Continue statin  Stable,lifestyle modification      Essential hypertension  Chronic, uncontrolled. Latest blood pressure and vitals reviewed-     Temp:  [97.5 °F (36.4 °C)-98.7 °F (37.1 °C)]    Pulse:  [52-63]   Resp:  [14-18]   BP: (117-152)/(56-68)   SpO2:  [96 %-98 %] .   Home meds for hypertension were reviewed and noted below.   Hypertension Medications               furosemide (LASIX) 80 MG tablet Take 1 tablet (80 mg total) by mouth 2 (two) times daily.    metOLazone (ZAROXOLYN) 5 MG tablet Take 1 tablet (5 mg total) by mouth once daily.    metoprolol succinate (TOPROL-XL) 25 MG 24 hr tablet Take 25 mg by mouth once daily.            While in the hospital, will manage blood pressure as follows; Continue home antihypertensive regimen    Will utilize p.r.n. blood pressure medication only if patient's blood pressure greater than 180/110 and he develops symptoms such as worsening chest pain or shortness of breath.      VTE Risk Mitigation (From admission, onward)           Ordered     Place sequential compression device  Until discontinued         11/28/23 0241     Reason for no Mechanical VTE Prophylaxis  Once        Question:  Reasons:  Answer:  Physician Provided (leave comment)    11/28/23 0241     IP VTE HIGH RISK PATIENT  Once         11/28/23 0241                    Discharge Planning   ELY:      Code Status: Full Code   Is the patient medically ready for discharge?:     Reason for patient still in hospital (select all that apply): Treatment and Consult recommendations  Discharge Plan A: Return to nursing home                  Jose L Evans III, MD  Department of Hospital Medicine   [facility]

## 2023-11-30 NOTE — ASSESSMENT & PLAN NOTE
Creatine stable for now. BMP reviewed- noted Estimated Creatinine Clearance: 37.1 mL/min (A) (based on SCr of 2.1 mg/dL (H)). according to latest data. Based on current GFR, CKD stage is stage 3 - GFR 30-59.    Monitor UOP and serial BMP and adjust therapy as needed. Renally dose meds.   Avoid nephrotoxic medications and procedures.

## 2023-11-30 NOTE — NURSING
[facility], [facility]  Nurses Note -- 4 Eyes      11/29/2023       Skin assessed on: Q Shift      [] No Pressure Injuries Present    []Prevention Measures Documented    [x] Yes LDA  for Pressure Injury Previously documented     [] Yes New Pressure Injury Discovered   [] LDA for New Pressure Injury Added      Attending RN:  Josiane Dodge RN     Second RN:  Samantha RN

## 2023-11-30 NOTE — NURSING
[facility], [facility]  Nurses Note -- 4 Eyes      11/30/2023       Skin assessed on: Q Shift      [x] No Pressure Injuries Present    [x]Prevention Measures Documented    [] Yes LDA  for Pressure Injury Previously documented     [] Yes New Pressure Injury Discovered   [] LDA for New Pressure Injury Added      Attending RN:  Avis Hernandez RN     Second RN:  JOYCE Joshua

## 2023-11-30 NOTE — ASSESSMENT & PLAN NOTE
Patient's anemia is currently uncontrolled. Has not received any PRBCs to date. Etiology likely d/t Iron deficiency and chronic disease due to Chronic Kidney Disease/ESRD  Current CBC reviewed-   Lab Results   Component Value Date    HGB 8.6 (L) 11/30/2023    HCT 29.0 (L) 11/30/2023   Will obtain iron panel and ferritin  Monitor serial CBC and transfuse if patient becomes hemodynamically unstable, symptomatic or H/H drops below 7/21.

## 2023-11-30 NOTE — PLAN OF CARE
After further consideration and review of recent imaging, there is no plan for vascular intervention at this time.  Non-invasive studies indicate only mild disease, and given presence of heel osteo and patients clinical status (CHF, CKD, non-ambulatory, etc), the potential benefit of intervention does not outweigh the risk at this time.  OK from vascular standpoint to proceed with any indicated debridements or podiatry procedures, without prior revasc.  If there is clinical concern for poor perfusion on debridement or poor healing, will reconsider angiogram at a later time.    Osei Yeboah MD, PhD  Vascular and Endovascular Surgery

## 2023-11-30 NOTE — ASSESSMENT & PLAN NOTE
History of multiple decubitus ulcers, previous buttock ulcers now healed  Has bilateral heel wound and lat rt foot  Xray was negative for DVT,however acute phase reactants were elevated  CRP 57.8,Sed rate 83,with procalcitonin elevated at 6.09  Will hold off on starting antibiotics  Podiatrist consult will see him this morning for possible bone biopsy,wound debridement and starting antibiotics  -wound cultures growing MRSA.n Started on vancomycin and ceftriaxone

## 2023-11-30 NOTE — ASSESSMENT & PLAN NOTE
Complained of urgency and straining at micturition  UA showed cloudy urine,with 3+ leukocyte esterase,rbc 66,wbc>100 and moderate bacteria  Currently symptoms has abaited, proteus growing in urine with ceftriaxone sensitive.  -continue abx

## 2023-11-30 NOTE — PROGRESS NOTES
Vancomycin consult follow-up:    Patient reviewed, renal function stable, no new levels, continue current therapy; Next levels due: trough due 12/1/2023 at 1500

## 2023-12-01 ENCOUNTER — ANESTHESIA (OUTPATIENT)
Dept: SURGERY | Facility: HOSPITAL | Age: 73
DRG: 264 | End: 2023-12-01
Payer: MEDICARE

## 2023-12-01 LAB
ANION GAP SERPL CALC-SCNC: 13 MMOL/L (ref 8–16)
BACTERIA BLD CULT: ABNORMAL
BACTERIA BLD CULT: NORMAL
BUN SERPL-MCNC: 48 MG/DL (ref 8–23)
CALCIUM SERPL-MCNC: 9.7 MG/DL (ref 8.7–10.5)
CHLORIDE SERPL-SCNC: 90 MMOL/L (ref 95–110)
CO2 SERPL-SCNC: 31 MMOL/L (ref 23–29)
CREAT SERPL-MCNC: 2.1 MG/DL (ref 0.5–1.4)
EST. GFR  (NO RACE VARIABLE): 33 ML/MIN/1.73 M^2
GLUCOSE SERPL-MCNC: 88 MG/DL (ref 70–110)
GRAM STN SPEC: NORMAL
POCT GLUCOSE: 88 MG/DL (ref 70–110)
POCT GLUCOSE: 92 MG/DL (ref 70–110)
POCT GLUCOSE: 98 MG/DL (ref 70–110)
POTASSIUM SERPL-SCNC: 3.1 MMOL/L (ref 3.5–5.1)
SODIUM SERPL-SCNC: 134 MMOL/L (ref 136–145)
VANCOMYCIN TROUGH SERPL-MCNC: 24.8 UG/ML (ref 10–22)

## 2023-12-01 PROCEDURE — 87116 MYCOBACTERIA CULTURE: CPT | Mod: 59 | Performed by: STUDENT IN AN ORGANIZED HEALTH CARE EDUCATION/TRAINING PROGRAM

## 2023-12-01 PROCEDURE — 20700 MNL PREP&INSJ DP RX DLVR DEV: CPT | Mod: ,,, | Performed by: PODIATRIST

## 2023-12-01 PROCEDURE — 20220 PR BONE BIOPSY,TROCAR/NEEDLE SUPERF: ICD-10-PCS | Mod: 51,,, | Performed by: PODIATRIST

## 2023-12-01 PROCEDURE — 36000707: Performed by: PODIATRIST

## 2023-12-01 PROCEDURE — 88307 TISSUE EXAM BY PATHOLOGIST: CPT | Performed by: PATHOLOGY

## 2023-12-01 PROCEDURE — 63600175 PHARM REV CODE 636 W HCPCS: Performed by: NURSE ANESTHETIST, CERTIFIED REGISTERED

## 2023-12-01 PROCEDURE — 11000001 HC ACUTE MED/SURG PRIVATE ROOM

## 2023-12-01 PROCEDURE — 87102 FUNGUS ISOLATION CULTURE: CPT | Performed by: STUDENT IN AN ORGANIZED HEALTH CARE EDUCATION/TRAINING PROGRAM

## 2023-12-01 PROCEDURE — 25000003 PHARM REV CODE 250: Performed by: NURSE ANESTHETIST, CERTIFIED REGISTERED

## 2023-12-01 PROCEDURE — 25000003 PHARM REV CODE 250: Performed by: SURGERY

## 2023-12-01 PROCEDURE — 37000008 HC ANESTHESIA 1ST 15 MINUTES: Performed by: PODIATRIST

## 2023-12-01 PROCEDURE — 88307 TISSUE EXAM BY PATHOLOGIST: CPT | Mod: 26,,, | Performed by: PATHOLOGY

## 2023-12-01 PROCEDURE — 36000706: Performed by: PODIATRIST

## 2023-12-01 PROCEDURE — 36415 COLL VENOUS BLD VENIPUNCTURE: CPT | Performed by: ANESTHESIOLOGY

## 2023-12-01 PROCEDURE — 27201423 OPTIME MED/SURG SUP & DEVICES STERILE SUPPLY: Performed by: PODIATRIST

## 2023-12-01 PROCEDURE — 88307 PR  SURG PATH,LEVEL V: ICD-10-PCS | Mod: 26,,, | Performed by: PATHOLOGY

## 2023-12-01 PROCEDURE — 71000033 HC RECOVERY, INTIAL HOUR: Performed by: PODIATRIST

## 2023-12-01 PROCEDURE — 80048 BASIC METABOLIC PNL TOTAL CA: CPT | Performed by: ANESTHESIOLOGY

## 2023-12-01 PROCEDURE — 87070 CULTURE OTHR SPECIMN AEROBIC: CPT | Mod: 59 | Performed by: STUDENT IN AN ORGANIZED HEALTH CARE EDUCATION/TRAINING PROGRAM

## 2023-12-01 PROCEDURE — 36415 COLL VENOUS BLD VENIPUNCTURE: CPT | Performed by: STUDENT IN AN ORGANIZED HEALTH CARE EDUCATION/TRAINING PROGRAM

## 2023-12-01 PROCEDURE — 87075 CULTR BACTERIA EXCEPT BLOOD: CPT | Mod: 59 | Performed by: STUDENT IN AN ORGANIZED HEALTH CARE EDUCATION/TRAINING PROGRAM

## 2023-12-01 PROCEDURE — 80202 ASSAY OF VANCOMYCIN: CPT | Performed by: STUDENT IN AN ORGANIZED HEALTH CARE EDUCATION/TRAINING PROGRAM

## 2023-12-01 PROCEDURE — 87186 SC STD MICRODIL/AGAR DIL: CPT | Performed by: STUDENT IN AN ORGANIZED HEALTH CARE EDUCATION/TRAINING PROGRAM

## 2023-12-01 PROCEDURE — D9220A PRA ANESTHESIA: Mod: CRNA,,, | Performed by: NURSE ANESTHETIST, CERTIFIED REGISTERED

## 2023-12-01 PROCEDURE — D9220A PRA ANESTHESIA: ICD-10-PCS | Mod: CRNA,,, | Performed by: NURSE ANESTHETIST, CERTIFIED REGISTERED

## 2023-12-01 PROCEDURE — 63600175 PHARM REV CODE 636 W HCPCS: Performed by: STUDENT IN AN ORGANIZED HEALTH CARE EDUCATION/TRAINING PROGRAM

## 2023-12-01 PROCEDURE — 87206 SMEAR FLUORESCENT/ACID STAI: CPT | Mod: 91 | Performed by: STUDENT IN AN ORGANIZED HEALTH CARE EDUCATION/TRAINING PROGRAM

## 2023-12-01 PROCEDURE — 25000003 PHARM REV CODE 250: Performed by: PODIATRIST

## 2023-12-01 PROCEDURE — 25000003 PHARM REV CODE 250: Performed by: STUDENT IN AN ORGANIZED HEALTH CARE EDUCATION/TRAINING PROGRAM

## 2023-12-01 PROCEDURE — 87205 SMEAR GRAM STAIN: CPT | Mod: 59 | Performed by: STUDENT IN AN ORGANIZED HEALTH CARE EDUCATION/TRAINING PROGRAM

## 2023-12-01 PROCEDURE — D9220A PRA ANESTHESIA: ICD-10-PCS | Mod: ANES,,, | Performed by: ANESTHESIOLOGY

## 2023-12-01 PROCEDURE — 11043 DBRDMT MUSC&/FSCA 1ST 20/<: CPT | Mod: ,,, | Performed by: PODIATRIST

## 2023-12-01 PROCEDURE — D9220A PRA ANESTHESIA: Mod: ANES,,, | Performed by: ANESTHESIOLOGY

## 2023-12-01 PROCEDURE — 87077 CULTURE AEROBIC IDENTIFY: CPT | Performed by: STUDENT IN AN ORGANIZED HEALTH CARE EDUCATION/TRAINING PROGRAM

## 2023-12-01 PROCEDURE — 27000207 HC ISOLATION

## 2023-12-01 PROCEDURE — 20700 PR MANUAL PREP AND INSERTION DEEP DRUG DELIVERY DEV: ICD-10-PCS | Mod: ,,, | Performed by: PODIATRIST

## 2023-12-01 PROCEDURE — 63600175 PHARM REV CODE 636 W HCPCS: Performed by: PODIATRIST

## 2023-12-01 PROCEDURE — 37000009 HC ANESTHESIA EA ADD 15 MINS: Performed by: PODIATRIST

## 2023-12-01 PROCEDURE — 20220 BONE BIOPSY TROCAR/NDL SUPFC: CPT | Mod: 51,,, | Performed by: PODIATRIST

## 2023-12-01 PROCEDURE — C1713 ANCHOR/SCREW BN/BN,TIS/BN: HCPCS | Performed by: PODIATRIST

## 2023-12-01 PROCEDURE — 11043 PR DEBRIDEMENT, SKIN, SUB-Q TISSUE,MUSCLE,=<20 SQ CM: ICD-10-PCS | Mod: ,,, | Performed by: PODIATRIST

## 2023-12-01 DEVICE — KIT GRAFT BONE/SUB STIM 10ML: Type: IMPLANTABLE DEVICE | Site: FOOT | Status: FUNCTIONAL

## 2023-12-01 RX ORDER — BUPIVACAINE HYDROCHLORIDE 2.5 MG/ML
INJECTION, SOLUTION INFILTRATION; PERINEURAL
Status: DISCONTINUED | OUTPATIENT
Start: 2023-12-01 | End: 2023-12-01 | Stop reason: HOSPADM

## 2023-12-01 RX ORDER — LIDOCAINE HYDROCHLORIDE 10 MG/ML
INJECTION INFILTRATION; PERINEURAL
Status: DISCONTINUED | OUTPATIENT
Start: 2023-12-01 | End: 2023-12-01 | Stop reason: HOSPADM

## 2023-12-01 RX ORDER — SODIUM CHLORIDE 0.9 % (FLUSH) 0.9 %
10 SYRINGE (ML) INJECTION
Status: DISCONTINUED | OUTPATIENT
Start: 2023-12-01 | End: 2023-12-01 | Stop reason: HOSPADM

## 2023-12-01 RX ORDER — PROPOFOL 10 MG/ML
VIAL (ML) INTRAVENOUS CONTINUOUS PRN
Status: DISCONTINUED | OUTPATIENT
Start: 2023-12-01 | End: 2023-12-01

## 2023-12-01 RX ORDER — MUPIROCIN 20 MG/G
OINTMENT TOPICAL 2 TIMES DAILY
Status: CANCELLED | OUTPATIENT
Start: 2023-12-01 | End: 2023-12-06

## 2023-12-01 RX ORDER — POTASSIUM CHLORIDE 20 MEQ/1
40 TABLET, EXTENDED RELEASE ORAL ONCE
Status: COMPLETED | OUTPATIENT
Start: 2023-12-01 | End: 2023-12-01

## 2023-12-01 RX ORDER — PHENYLEPHRINE HYDROCHLORIDE 10 MG/ML
INJECTION INTRAVENOUS
Status: DISCONTINUED | OUTPATIENT
Start: 2023-12-01 | End: 2023-12-01

## 2023-12-01 RX ORDER — VANCOMYCIN HYDROCHLORIDE 1 G/20ML
INJECTION, POWDER, LYOPHILIZED, FOR SOLUTION INTRAVENOUS
Status: DISCONTINUED | OUTPATIENT
Start: 2023-12-01 | End: 2023-12-01 | Stop reason: HOSPADM

## 2023-12-01 RX ORDER — ONDANSETRON 2 MG/ML
4 INJECTION INTRAMUSCULAR; INTRAVENOUS DAILY PRN
Status: DISCONTINUED | OUTPATIENT
Start: 2023-12-01 | End: 2023-12-01 | Stop reason: HOSPADM

## 2023-12-01 RX ORDER — MUPIROCIN 20 MG/G
OINTMENT TOPICAL 2 TIMES DAILY
Status: COMPLETED | OUTPATIENT
Start: 2023-12-01 | End: 2023-12-06

## 2023-12-01 RX ORDER — LIDOCAINE HYDROCHLORIDE 20 MG/ML
INJECTION INTRAVENOUS
Status: DISCONTINUED | OUTPATIENT
Start: 2023-12-01 | End: 2023-12-01

## 2023-12-01 RX ADMIN — VANCOMYCIN HYDROCHLORIDE 1250 MG: 1.25 INJECTION, POWDER, LYOPHILIZED, FOR SOLUTION INTRAVENOUS at 04:12

## 2023-12-01 RX ADMIN — ASPIRIN 81 MG CHEWABLE TABLET 81 MG: 81 TABLET CHEWABLE at 01:12

## 2023-12-01 RX ADMIN — SODIUM CHLORIDE, SODIUM LACTATE, POTASSIUM CHLORIDE, AND CALCIUM CHLORIDE: .6; .31; .03; .02 INJECTION, SOLUTION INTRAVENOUS at 08:12

## 2023-12-01 RX ADMIN — MUPIROCIN: 20 OINTMENT TOPICAL at 09:12

## 2023-12-01 RX ADMIN — CEFTRIAXONE 2 G: 2 INJECTION, POWDER, FOR SOLUTION INTRAMUSCULAR; INTRAVENOUS at 02:12

## 2023-12-01 RX ADMIN — AMIODARONE HYDROCHLORIDE 200 MG: 200 TABLET ORAL at 01:12

## 2023-12-01 RX ADMIN — PHENYLEPHRINE HYDROCHLORIDE 200 MCG: 10 INJECTION INTRAVENOUS at 10:12

## 2023-12-01 RX ADMIN — OXYCODONE HYDROCHLORIDE AND ACETAMINOPHEN 1 TABLET: 10; 325 TABLET ORAL at 05:12

## 2023-12-01 RX ADMIN — TAMSULOSIN HYDROCHLORIDE 0.4 MG: 0.4 CAPSULE ORAL at 01:12

## 2023-12-01 RX ADMIN — OXYCODONE HYDROCHLORIDE AND ACETAMINOPHEN 1 TABLET: 10; 325 TABLET ORAL at 11:12

## 2023-12-01 RX ADMIN — FERROUS SULFATE TAB 325 MG (65 MG ELEMENTAL FE) 1 EACH: 325 (65 FE) TAB at 01:12

## 2023-12-01 RX ADMIN — POTASSIUM CHLORIDE 40 MEQ: 1500 TABLET, EXTENDED RELEASE ORAL at 02:12

## 2023-12-01 RX ADMIN — METOPROLOL SUCCINATE 12.5 MG: 25 TABLET, EXTENDED RELEASE ORAL at 01:12

## 2023-12-01 RX ADMIN — FUROSEMIDE 40 MG: 10 INJECTION, SOLUTION INTRAVENOUS at 08:12

## 2023-12-01 RX ADMIN — PHENYLEPHRINE HYDROCHLORIDE 100 MCG: 10 INJECTION INTRAVENOUS at 10:12

## 2023-12-01 RX ADMIN — PROPOFOL 50 MCG/KG/MIN: 10 INJECTION, EMULSION INTRAVENOUS at 09:12

## 2023-12-01 RX ADMIN — FOLIC ACID 1 MG: 1 TABLET ORAL at 01:12

## 2023-12-01 RX ADMIN — LIDOCAINE HYDROCHLORIDE 100 MG: 20 INJECTION, SOLUTION INTRAVENOUS at 09:12

## 2023-12-01 RX ADMIN — ATORVASTATIN CALCIUM 40 MG: 40 TABLET, FILM COATED ORAL at 08:12

## 2023-12-01 RX ADMIN — METOLAZONE 5 MG: 5 TABLET ORAL at 01:12

## 2023-12-01 NOTE — SUBJECTIVE & OBJECTIVE
Interval History: Pt states he is doing good. Glad to be back from the procedure. Pt denies fever or chills.     Review of Systems  Objective:     Vital Signs (Most Recent):  Temp: 98.5 °F (36.9 °C) (12/01/23 1247)  Pulse: (!) 57 (12/01/23 1247)  Resp: 18 (12/01/23 1247)  BP: 133/60 (12/01/23 1247)  SpO2: 99 % (12/01/23 1247) Vital Signs (24h Range):  Temp:  [97.4 °F (36.3 °C)-99.1 °F (37.3 °C)] 98.5 °F (36.9 °C)  Pulse:  [55-65] 57  Resp:  [14-18] 18  SpO2:  [94 %-100 %] 99 %  BP: (119-133)/(57-63) 133/60     Weight: 96.6 kg (213 lb)  Body mass index is 29.71 kg/m².    Intake/Output Summary (Last 24 hours) at 12/1/2023 1406  Last data filed at 12/1/2023 1200  Gross per 24 hour   Intake 250 ml   Output 1590 ml   Net -1340 ml         Physical Exam  Vitals reviewed.   Constitutional:       General: He is not in acute distress.  HENT:      Head: Normocephalic and atraumatic.      Mouth/Throat:      Mouth: Mucous membranes are dry.      Pharynx: Oropharynx is clear.   Eyes:      General: No scleral icterus.     Extraocular Movements: Extraocular movements intact.   Cardiovascular:      Rate and Rhythm: Normal rate and regular rhythm.   Pulmonary:      Effort: Pulmonary effort is normal. No respiratory distress.   Abdominal:      General: There is no distension.      Palpations: Abdomen is soft.      Tenderness: There is no abdominal tenderness. There is no guarding or rebound.   Musculoskeletal:         General: Swelling (improved) present. No tenderness.      Cervical back: Neck supple. No rigidity.   Skin:     General: Skin is warm and dry.   Neurological:      General: No focal deficit present.      Mental Status: He is alert and oriented to person, place, and time.   Psychiatric:         Mood and Affect: Mood normal.         Behavior: Behavior normal.             Significant Labs: All pertinent labs within the past 24 hours have been reviewed.    Significant Imaging: I have reviewed all pertinent imaging  results/findings within the past 24 hours.

## 2023-12-01 NOTE — SUBJECTIVE & OBJECTIVE
Past Medical History:   Diagnosis Date    Acute congestive heart failure 3/20/2020    Alcohol abuse     Arthritis     Asthma attack 11/24/2021    Coronary artery disease     Diabetes mellitus     Hyperlipemia     Hypertension     Multiple thyroid nodules 6/14/2023    Rhabdomyolysis        Past Surgical History:   Procedure Laterality Date    ECHOCARDIOGRAM,TRANSESOPHAGEAL N/A 9/21/2023    Procedure: Transesophageal echo (MARIA) intra-procedure log documentation;  Surgeon: Luisana Rodríguez MD;  Location: [facility] CATH LAB;  Service: Cardiology;  Laterality: N/A;    EYE SURGERY      TRANSESOPHAGEAL ECHOCARDIOGRAM WITH POSSIBLE CARDIOVERSION (MARIA W/ POSS CARDIOVERSION) N/A 1/5/2023    Procedure: Transesophageal echo (MARIA) intra-procedure log documentation;  Surgeon: Indra Foote MD;  Location: [facility] CATH LAB;  Service: Cardiology;  Laterality: N/A;    TRANSESOPHAGEAL ECHOCARDIOGRAPHY N/A 9/21/2023    Procedure: ECHOCARDIOGRAM, TRANSESOPHAGEAL;  Surgeon: Luisana Rodríguez MD;  Location: [facility] CATH LAB;  Service: Cardiology;  Laterality: N/A;    TREATMENT OF CARDIAC ARRHYTHMIA N/A 12/7/2020    Procedure: Cardioversion or Defibrillation;  Surgeon: Indra Foote MD;  Location: [facility] CATH LAB;  Service: Cardiology;  Laterality: N/A;    TREATMENT OF CARDIAC ARRHYTHMIA N/A 12/30/2020    Procedure: Cardioversion or Defibrillation;  Surgeon: Tyrone Steen MD;  Location: [facility] CATH LAB;  Service: Cardiology;  Laterality: N/A;    TREATMENT OF CARDIAC ARRHYTHMIA N/A 1/5/2023    Procedure: Cardioversion or Defibrillation;  Surgeon: Indra Foote MD;  Location: [facility] CATH LAB;  Service: Cardiology;  Laterality: N/A;    VASCULAR SURGERY         Review of patient's allergies indicates:  No Known Allergies    No current facility-administered medications on file prior to encounter.     Current Outpatient Medications on File Prior to Encounter   Medication Sig    albuterol (PROVENTIL/VENTOLIN HFA) 90 mcg/actuation inhaler  Inhale 2 puffs into the lungs every 6 (six) hours as needed for Wheezing or Shortness of Breath.    allopurinoL (ZYLOPRIM) 100 MG tablet Take 1 tablet (100 mg total) by mouth once daily.    amiodarone (PACERONE) 200 MG Tab Take 200 mg by mouth once daily.    ascorbic acid, vitamin C, (VITAMIN C) 500 MG tablet Take 500 mg by mouth 2 (two) times daily.    ELIQUIS 5 mg Tab Take 5 mg by mouth 2 (two) times daily.    ferrous sulfate (FEOSOL) 325 mg (65 mg iron) Tab tablet Take 325 mg by mouth once daily.    folic acid (FOLVITE) 1 MG tablet Take 1 mg by mouth once daily.    furosemide (LASIX) 80 MG tablet Take 1 tablet (80 mg total) by mouth 2 (two) times daily.    gabapentin (NEURONTIN) 100 MG capsule Take 1 capsule (100 mg total) by mouth 2 (two) times daily.    metOLazone (ZAROXOLYN) 5 MG tablet Take 1 tablet (5 mg total) by mouth once daily.    metoprolol succinate (TOPROL-XL) 25 MG 24 hr tablet Take 25 mg by mouth once daily.    multivitamin (ONE DAILY MULTIVITAMIN) per tablet Take 1 tablet by mouth once daily.    potassium chloride (MICRO-K) 10 MEQ CpSR Take 10 mEq by mouth once daily.    rosuvastatin (CRESTOR) 40 MG Tab Take 40 mg by mouth every evening.    senna-docusate 8.6-50 mg (PERICOLACE) 8.6-50 mg per tablet Take 2 tablets by mouth 2 (two) times daily as needed for Constipation.    tamsulosin (FLOMAX) 0.4 mg Cap Take 1 capsule (0.4 mg total) by mouth once daily.    insulin aspart U-100 (NOVOLOG) 100 unit/mL injection Inject into the skin. Per sliding scale     Family History       Problem Relation (Age of Onset)    Diabetes Mother, Father, Sister    Hypertension Mother, Father, Sister          Tobacco Use    Smoking status: Never    Smokeless tobacco: Never   Substance and Sexual Activity    Alcohol use: Not Currently     Alcohol/week: 6.0 standard drinks of alcohol     Types: 6 Cans of beer per week    Drug use: No    Sexual activity: Yes     Partners: Female     Review of Systems   Constitutional:   Negative for appetite change, chills, diaphoresis and fatigue.   HENT:  Negative for congestion, sore throat and tinnitus.    Eyes:  Negative for pain, discharge and itching.   Respiratory:  Negative for cough, chest tightness, shortness of breath and wheezing.    Cardiovascular:  Positive for palpitations and leg swelling. Negative for chest pain.   Gastrointestinal:  Negative for abdominal distention, abdominal pain, blood in stool, nausea and vomiting.   Endocrine: Negative for cold intolerance, heat intolerance and polydipsia.   Genitourinary:  Negative for difficulty urinating, dysuria, flank pain, frequency and hematuria.   Musculoskeletal:  Positive for joint swelling. Negative for arthralgias, back pain and myalgias.   Skin:  Positive for rash (generalized scaly rash). Negative for color change and pallor.   Neurological:  Positive for weakness. Negative for dizziness, seizures, facial asymmetry, light-headedness, numbness and headaches.   Psychiatric/Behavioral:  Negative for agitation, confusion and hallucinations.      Objective:     Vital Signs (Most Recent):  Temp: 98.5 °F (36.9 °C) (11/30/23 1945)  Pulse: 61 (11/30/23 1945)  Resp: 16 (11/30/23 2142)  BP: 124/60 (11/30/23 1945)  SpO2: (!) 94 % (11/30/23 1945) Vital Signs (24h Range):  Temp:  [97.8 °F (36.6 °C)-99.1 °F (37.3 °C)] 98.5 °F (36.9 °C)  Pulse:  [60-63] 61  Resp:  [14-18] 16  SpO2:  [94 %-98 %] 94 %  BP: (117-138)/(56-61) 124/60     Weight: 96.6 kg (213 lb)  Body mass index is 29.71 kg/m².     Physical Exam  Constitutional:       General: He is not in acute distress.     Appearance: He is ill-appearing. He is not toxic-appearing.   HENT:      Head: Normocephalic and atraumatic.      Nose: Nose normal. No congestion or rhinorrhea.      Mouth/Throat:      Mouth: Mucous membranes are moist.   Eyes:      Extraocular Movements: Extraocular movements intact.      Conjunctiva/sclera: Conjunctivae normal.      Pupils: Pupils are equal, round, and  "reactive to light.   Cardiovascular:      Rate and Rhythm: Regular rhythm. Bradycardia present.      Pulses: Normal pulses.      Heart sounds: Normal heart sounds.   Pulmonary:      Effort: Pulmonary effort is normal. No respiratory distress.      Breath sounds: Normal breath sounds. No stridor. No wheezing or rales.   Chest:      Chest wall: No tenderness.   Abdominal:      General: Abdomen is flat. Bowel sounds are normal. There is no distension.      Palpations: Abdomen is soft.      Tenderness: There is no abdominal tenderness. There is no right CVA tenderness, left CVA tenderness, guarding or rebound.   Musculoskeletal:         General: Swelling present. No tenderness.      Cervical back: Normal range of motion. No rigidity.      Right lower leg: Edema (+1) present.      Left lower leg: Edema (+2 pedal edema) present.   Skin:     General: Skin is warm and dry.      Coloration: Skin is not jaundiced.      Findings: Lesion and rash present.   Neurological:      General: No focal deficit present.      Mental Status: He is alert and oriented to person, place, and time. Mental status is at baseline.      Cranial Nerves: No cranial nerve deficit.      Sensory: No sensory deficit.   Psychiatric:         Mood and Affect: Mood normal.         Behavior: Behavior normal.         Thought Content: Thought content normal.              CRANIAL NERVES     CN III, IV, VI   Pupils are equal, round, and reactive to light.       Significant Labs: All pertinent labs within the past 24 hours have been reviewed.  A1C:   Recent Labs   Lab 09/20/23  0446   HGBA1C 5.5       Bilirubin:   Recent Labs   Lab 11/27/23  1509   BILITOT 0.5       Blood Culture:   No results for input(s): "LABBLOO" in the last 48 hours.    CBC:   Recent Labs   Lab 11/29/23 0437 11/30/23 0446   WBC 8.37 7.09   HGB 8.5* 8.6*   HCT 28.4* 29.0*    266       CMP:   Recent Labs   Lab 11/29/23 0437 11/30/23 0446    132*   K 2.8* 2.8*   CL 94* 92*   CO2 " "32* 30*   GLU 67* 78   BUN 48* 48*   CREATININE 2.1* 2.1*   CALCIUM 9.5 9.4   ANIONGAP 12 10       Cardiac Markers:   No results for input(s): "CKMB", "MYOGLOBIN", "BNP", "TROPISTAT" in the last 48 hours.    Coagulation:   No results for input(s): "PT", "INR", "APTT" in the last 48 hours.    Lactic Acid:   No results for input(s): "LACTATE" in the last 48 hours.      TSH:   Recent Labs   Lab 09/20/23  0446   TSH 3.224       Urine Studies:   No results for input(s): "COLORU", "APPEARANCEUA", "PHUR", "SPECGRAV", "PROTEINUA", "GLUCUA", "KETONESU", "BILIRUBINUA", "OCCULTUA", "NITRITE", "UROBILINOGEN", "LEUKOCYTESUR", "RBCUA", "WBCUA", "BACTERIA", "SQUAMEPITHEL", "HYALINECASTS" in the last 48 hours.    Invalid input(s): "WRIGHTSUR"      Significant Imaging: I have reviewed all pertinent imaging results/findings within the past 24 hours.  Imaging Results              X-Ray Chest AP Portable (Final result)  Result time 11/27/23 17:32:49      Final result by Jenna Owens MD (11/27/23 17:32:49)                   Impression:      Mild cardiomegaly.  Nonspecific prominence of the interstitium.  Probable bilateral pleural effusions.  The possibility of mild CHF cannot be entirely excluded.      Electronically signed by: Jenna Owens  Date:    11/27/2023  Time:    17:32               Narrative:    EXAMINATION:  AP PORTABLE CHEST    CLINICAL HISTORY:  pre op;    TECHNIQUE:  AP portable chest radiograph was submitted.    COMPARISON:  09/23/2023    FINDINGS:  AP portable chest radiograph demonstrates mild enlargement of the cardiac silhouette.  There is tortuosity of the descending thoracic aorta.  There is mild nonspecific prominence of the interstitium.  There is subtle blunting of the costophrenic angles, which may suggest small pleural effusions and/or pleural thickening.  There is no focal consolidation or pneumothorax.                                       X-Ray Foot Complete Left (Final result)  Result time " 11/27/23 17:30:04      Final result by Jenna Owens MD (11/27/23 17:30:04)                   Impression:      No acute bony abnormality detected.  Degenerative changes of the midfoot and the 1st MTP.      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    FINDINGS:  Three views of the right foot demonstrate no acute fracture or dislocation.  Degenerative changes are seen involving the 1st metatarsophalangeal joint and the midfoot.  Small Achilles and moderate plantar spurring is noted.  The bones are diffusely osteopenic.  There is advanced vascular calcifications.    Three views of the left foot demonstrate acute fracture or dislocation.  Degenerative changes are seen involving the 1st metatarsophalangeal joint and the midfoot. Small Achilles and moderate plantar spurring is noted. The bones are diffusely osteopenic. There is advanced vascular calcifications.                                       X-Ray Foot Complete Right (Final result)  Result time 11/27/23 17:30:04      Final result by Jenna Owens MD (11/27/23 17:30:04)                   Impression:      No acute bony abnormality detected.  Degenerative changes of the midfoot and the 1st MTP.      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    FINDINGS:  Three views of the right foot demonstrate no acute fracture or dislocation.  Degenerative changes are  seen involving the 1st metatarsophalangeal joint and the midfoot.  Small Achilles and moderate plantar spurring is noted.  The bones are diffusely osteopenic.  There is advanced vascular calcifications.    Three views of the left foot demonstrate acute fracture or dislocation.  Degenerative changes are seen involving the 1st metatarsophalangeal joint and the midfoot. Small Achilles and moderate plantar spurring is noted. The bones are diffusely osteopenic. There is advanced vascular calcifications.                                       X-Ray Knee 1 or 2 View Left (Final result)  Result time 11/27/23 17:22:44      Final result by Jenna Owens MD (11/27/23 17:22:44)                   Impression:      No acute bony abnormality detected.  Large knee joint effusion.  Relatively stable degenerative changes.    Bone infarcts.      Electronically signed by: Jenna Owens  Date:    11/27/2023  Time:    17:22               Narrative:    EXAMINATION:  TWO VIEWS OF THE LEFT KNEE    CLINICAL HISTORY:  Effusion, left knee    TECHNIQUE:  AP and lateral view of the left knee    COMPARISON:  07/31/2023    FINDINGS:  Two views of the left knee demonstrate no acute fracture or dislocation.  There is narrowing and continued sclerosing of the medial knee joint compartment.  In addition, there is narrowing of the inferior aspect of the patellofemoral space.  There is mild patellar spurring.  There is a large suprapatellar effusion.  Again seen are amorphous sclerotic densities in the visualized femur and tibia, which are likely bone infarcts.  Bones are osteopenic.  Advanced vascular calcifications are present.

## 2023-12-01 NOTE — HPI
"73M admitted 11/27 from NH for wounds. Pt reports his L knee has been aching for the last 3-4 months. Also reports b/l foot wounds and some recent swelling of leg and "I barely can walk". Denies f/c. Denies abx allergies. Denies indwelling hardware. There was plan for angio today, but cancelled because intervention not thought to be beneficial at this time.     Podiatry now following- Plan for OR debridement, bone biopsy tomorrow.       MRI   1. Osteomyelitis of the bilateral posterolateral calcaneus.  2. Osteomyelitis of the left 5th metatarsal head and possibly right 5th metatarsal head.      R foot wound cultures-          Aerobic culture [[id number]] (Abnormal)  Collected: 11/28/23 1347   Order Status: Completed Specimen: Wound from Foot, Right Updated: 11/30/23 1041    Aerobic Bacterial Culture Results called to and read back by:4E KATHY STAFFORD 11/30/2023  10:39     METHICILLIN RESISTANT STAPHYLOCOCCUS AUREUS  Many   Abnormal    Susceptibility     Methicillin resistant Staphylococcus aureus     CULTURE, AEROBIC  (SPECIFY SOURCE)     Clindamycin <=0.5 mcg/mL Sensitive     Erythromycin >4 mcg/mL Resistant     Oxacillin >2 mcg/mL Resistant     Penicillin >8 mcg/mL Resistant     Tetracycline <=4 mcg/mL Sensitive     Trimeth/Sulfa <=0.5/9.5 m... Sensitive     Vancomycin 2 mcg/mL Sensitive                   On vanc/ceftriaxone     ID consulted for "heel osteo with mrsa infection "  "

## 2023-12-01 NOTE — PLAN OF CARE
Problem: Adult Inpatient Plan of Care  Goal: Plan of Care Review  Outcome: Ongoing, Progressing  Flowsheets (Taken 12/1/2023 0601)  Plan of Care Reviewed With: patient     Problem: Skin Injury Risk Increased  Goal: Skin Health and Integrity  Outcome: Ongoing, Progressing  Intervention: Optimize Skin Protection  Flowsheets (Taken 12/1/2023 0601)  Pressure Reduction Techniques:   frequent weight shift encouraged   positioned off wounds   heels elevated off bed  Head of Bed (HOB) Positioning: HOB elevated     Problem: Diabetes Comorbidity  Goal: Blood Glucose Level Within Targeted Range  Outcome: Ongoing, Progressing  Intervention: Monitor and Manage Glycemia  Flowsheets (Taken 12/1/2023 0601)  Glycemic Management: blood glucose monitored     Problem: Impaired Wound Healing  Goal: Optimal Wound Healing  Outcome: Ongoing, Progressing  Intervention: Promote Wound Healing  Flowsheets (Taken 12/1/2023 0601)  Pain Management Interventions: care clustered

## 2023-12-01 NOTE — PROGRESS NOTES
[facility]  Progress Note    Patient Name: Chato Bowie  MRN: [id number]  Patient Class: IP- Inpatient   Admission Date: 11/27/2023  Length of Stay: 4 days  Attending Physician: Jose L Evans III, MD  Primary Care Provider: Irlanda Valenzuela -        Subjective:     Principal Problem:Osteomyelitis of foot        HPI:  73-year-old  male,current a nursing home resident,who was brought to the ED for evaluation after receiving x-ray imaging obtained 5 days earlier with concern for possible osteomyelitis,prior to obtaining the knee and foot x-ray,he had complained of left knee pain,he also has bilateral heel decubitus ulcers,wound is covered with escar,and can not be staged,although it was reported that he has osteomyelitis but the xray obtained did not show evidence of osteo per report,pain in the knee is sharp,non radiating,could be 10 out 10 in severity some time,currently only able to move around in a wheel chair,he denied fever,chills or rigor,although reports straining at micturition and urgency but denied dysuria,frequency or hematuria.He denied chest pain,shortness of breath,or orthopnea,although he has chronic bilateral lower extremities swelling,worse in the left lower extremity compared to the right,he denied previous history of lower extremity deep vein thrombosis.He denied change in bowel habit,no diarrhea or constipation.  His medical history os significant for type 2 DM,hypertension,hyperlipidemia,heart failure,coronary artery disease, hyperlipidemia,alcohol abuse,CKD 3b and multiple thyroid nodules.labs obtained showed elevated acute phase reactant with sed rate 83,procalcitonin 6.09,crp 57.8,but x-ray did not show evidence, will need MRI to confirm early osteomyelitis.    Overview/Hospital Course:  No notes on file    Interval History: Pt states he is doing good. Glad to be back from the procedure. Pt denies fever or chills.     Review of Systems  Objective:      Vital Signs (Most Recent):  Temp: 98.5 °F (36.9 °C) (12/01/23 1247)  Pulse: (!) 57 (12/01/23 1247)  Resp: 18 (12/01/23 1247)  BP: 133/60 (12/01/23 1247)  SpO2: 99 % (12/01/23 1247) Vital Signs (24h Range):  Temp:  [97.4 °F (36.3 °C)-99.1 °F (37.3 °C)] 98.5 °F (36.9 °C)  Pulse:  [55-65] 57  Resp:  [14-18] 18  SpO2:  [94 %-100 %] 99 %  BP: (119-133)/(57-63) 133/60     Weight: 96.6 kg (213 lb)  Body mass index is 29.71 kg/m².    Intake/Output Summary (Last 24 hours) at 12/1/2023 1406  Last data filed at 12/1/2023 1200  Gross per 24 hour   Intake 250 ml   Output 1590 ml   Net -1340 ml         Physical Exam  Vitals reviewed.   Constitutional:       General: He is not in acute distress.  HENT:      Head: Normocephalic and atraumatic.      Mouth/Throat:      Mouth: Mucous membranes are dry.      Pharynx: Oropharynx is clear.   Eyes:      General: No scleral icterus.     Extraocular Movements: Extraocular movements intact.   Cardiovascular:      Rate and Rhythm: Normal rate and regular rhythm.   Pulmonary:      Effort: Pulmonary effort is normal. No respiratory distress.   Abdominal:      General: There is no distension.      Palpations: Abdomen is soft.      Tenderness: There is no abdominal tenderness. There is no guarding or rebound.   Musculoskeletal:         General: Swelling (improved) present. No tenderness.      Cervical back: Neck supple. No rigidity.   Skin:     General: Skin is warm and dry.   Neurological:      General: No focal deficit present.      Mental Status: He is alert and oriented to person, place, and time.   Psychiatric:         Mood and Affect: Mood normal.         Behavior: Behavior normal.             Significant Labs: All pertinent labs within the past 24 hours have been reviewed.    Significant Imaging: I have reviewed all pertinent imaging results/findings within the past 24 hours.    Assessment/Plan:      * Osteomyelitis of foot  History of multiple decubitus ulcers, previous buttock ulcers  now healed  Has bilateral heel wound and lat rt foot  CRP 57.8,Sed rate 83,with procalcitonin elevated at 6.09  Will hold off on starting antibiotics  Podiatrist consult will see him this morning for possible bone biopsy,wound debridement and starting antibiotics  -wound cultures growing MRSA.n Started on vancomycin and ceftriaxone         Atherosclerosis of native artery of extremity  Vascular initially planned for angio, but on review did not think risks would out way benefits      Chronic diastolic heart failure  History of grade 3 diastolic dysfunction  Does not seem to be in acute diastolic heart failure exacerbation  Will continue twice daily furosemide, with metolazone        Multiple wounds  History of multiple decubitus ulcers, previous buttock ulcers now healed  Has bilateral heel wound and lat rt foot  CRP 57.8,Sed rate 83,with procalcitonin elevated at 6.09  Will hold off on starting antibiotics  Podiatrist consult will see him this morning for possible bone biopsy,wound debridement and starting antibiotics  -wound cultures growing MRSA.n Started on vancomycin and ceftriaxone    UTI (urinary tract infection)  Complained of urgency and straining at micturition  UA showed cloudy urine,with 3+ leukocyte esterase,rbc 66,wbc>100 and moderate bacteria  Currently symptoms has abaited, proteus growing in urine with ceftriaxone sensitive.  -continue abx    Paroxysmal atrial flutter  His of paroxysmal atrial fibrillation with multiple MARIA and cardiovesion  Currently on amiodarone and metoprolol 50xl  Cont apixan due to risk of thromboembolic stroke      BPH (benign prostatic hyperplasia)  Stable  Will consider tamsulosin for symptomatic treatment      Stage 3b chronic kidney disease (CKD)  Creatine stable for now. BMP reviewed- noted Estimated Creatinine Clearance: 37.1 mL/min (A) (based on SCr of 2.1 mg/dL (H)). according to latest data. Based on current GFR, CKD stage is stage 3 - GFR 30-59.    Monitor UOP and  serial BMP and adjust therapy as needed. Renally dose meds.   Avoid nephrotoxic medications and procedures.    Left knee pain  History of chronic left knee pain  Xray showed effusion  Septic arthritis vs gout vs effusion  Knee tap attempted, but minimal fluid able to be collected. Ortho following, but feel this is unlikely septic and I agree.      Alcohol abuse  History of alcohol use  Now in remission, has not had a drink in the recent past  Not at risk of withdrawal  Cont multivitamin,thiamine and folic acid      GERD (gastroesophageal reflux disease)  Stable    Anemia  Patient's anemia is currently uncontrolled. Has not received any PRBCs to date. Etiology likely d/t Iron deficiency and chronic disease due to Chronic Kidney Disease/ESRD  Current CBC reviewed-   Lab Results   Component Value Date    HGB 8.6 (L) 11/30/2023    HCT 29.0 (L) 11/30/2023   Will obtain iron panel and ferritin  Monitor serial CBC and transfuse if patient becomes hemodynamically unstable, symptomatic or H/H drops below 7/21.    Type 2 diabetes mellitus with kidney complication, with long-term current use of insulin  Patient's FSGs are controlled on current medication regimen.  Last A1c reviewed-   Lab Results   Component Value Date    HGBA1C 5.5 09/20/2023     Most recent fingerstick glucose reviewed-   Recent Labs   Lab 11/30/23  1620 11/30/23  1952 12/01/23  0721 12/01/23  1121   POCTGLUCOSE 151* 104 88 92       Current correctional scale  Low  Low hemoglobin A1C likely due to worsening kidney functions  Antihyperglycemics (From admission, onward)      Start     Stop Route Frequency Ordered    11/28/23 0900  insulin detemir U-100 (Levemir) pen 8 Units         -- SubQ 2 times daily 11/28/23 0241    11/28/23 0715  insulin aspart U-100 pen 2 Units         -- SubQ 3 times daily with meals 11/28/23 0241    11/28/23 0334  insulin aspart U-100 pen 0-5 Units         -- SubQ Before meals & nightly PRN 11/28/23 0241          Hold Oral  hypoglycemics while patient is in the hospital.  Will start basal,bolus and correctional insulin as needed    Dyslipidemia  Continue statin  Stable,lifestyle modification      Essential hypertension  Chronic, uncontrolled. Latest blood pressure and vitals reviewed-     Temp:  [97.4 °F (36.3 °C)-99.1 °F (37.3 °C)]   Pulse:  [55-65]   Resp:  [14-18]   BP: (119-133)/(57-63)   SpO2:  [94 %-100 %] .   Home meds for hypertension were reviewed and noted below.   Hypertension Medications               furosemide (LASIX) 80 MG tablet Take 1 tablet (80 mg total) by mouth 2 (two) times daily.    metOLazone (ZAROXOLYN) 5 MG tablet Take 1 tablet (5 mg total) by mouth once daily.    metoprolol succinate (TOPROL-XL) 25 MG 24 hr tablet Take 25 mg by mouth once daily.            While in the hospital, will manage blood pressure as follows; Continue home antihypertensive regimen    Will utilize p.r.n. blood pressure medication only if patient's blood pressure greater than 180/110 and he develops symptoms such as worsening chest pain or shortness of breath.      VTE Risk Mitigation (From admission, onward)           Ordered     Place sequential compression device  Until discontinued         11/28/23 0241     Reason for no Mechanical VTE Prophylaxis  Once        Question:  Reasons:  Answer:  Physician Provided (leave comment)    11/28/23 0241     IP VTE HIGH RISK PATIENT  Once         11/28/23 0241                    Discharge Planning   ELY:      Code Status: Full Code   Is the patient medically ready for discharge?:     Reason for patient still in hospital (select all that apply): Laboratory test and Treatment  Discharge Plan A: Return to nursing home                  Jose L Evans III, MD  Department of Hospital Medicine   [facility] [place] - Med Surg

## 2023-12-01 NOTE — PROGRESS NOTES
Ortho Daily Progress Note    Chato Bowie is a 73 y.o. male admitted on 11/27/2023      Chief Complaint/Reason for admission: Knee Pain (Ems called to 74yo male that has been having left knee pain. He had an xray on 11/22/23 and it resulted today and the nursing home was notified that he has osteomyelitis in his knee and was sent her for further evaluation and treatment. )       Hospital Day: 4  Post Op Day: Day of Surgery     The patient was seen and examined this morning at the bedside. Patient reports no acute issues overnight.  Patient reports that pain is adequately controlled.      No severe complaints of left knee pain.  The knee does have some swelling however he is moving it much better today.    _______________    Vitals:    12/01/23 1145 12/01/23 1247 12/01/23 1621 12/01/23 1725   BP: 132/63 133/60 (!) 150/70    Pulse: (!) 56 (!) 57 69    Resp: 16 18 18 18   Temp:  98.5 °F (36.9 °C) 98.3 °F (36.8 °C)    TempSrc:  Oral Oral    SpO2: 100% 99% 95%    Weight:       Height:           Vital Signs (Most Recent)  Temp: 98.3 °F (36.8 °C) (12/01/23 1621)  Pulse: 69 (12/01/23 1621)  Resp: 18 (12/01/23 1725)  BP: (!) 150/70 (12/01/23 1621)  SpO2: 95 % (12/01/23 1621)    Vital Signs Range (Last 24H):  Temp:  [97.4 °F (36.3 °C)-98.5 °F (36.9 °C)]   Pulse:  [55-69]   Resp:  [14-18]   BP: (119-150)/(57-70)   SpO2:  [94 %-100 %]       Physical:        Less pain to left knee today.  No cellulitis no erythema no worrisome signs for outward infection.  He is able to move the knee somewhat better today with flexion as well as extension.  NVI Distally  Palpable distal pulses  CR<3sec      Recent Labs     11/29/23  0437 11/30/23  0446 12/01/23  0815   K 2.8* 2.8* 3.1*   MG 1.8 1.8  --    CALCIUM 9.5 9.4 9.7   WBC 8.37 7.09  --    HGB 8.5* 8.6*  --    HCT 28.4* 29.0*  --     266  --        I/O last 3 completed shifts:  In: 552.8 [I.V.:552.8]  Out: 2900 [Urine:2900]          Assessment:  A/P  left knee severe arthritis        Plan:    PT/OT:  weight-bearing as tolerated  Pain Control  DVT Prophylaxis:      Is not appear left knee is infected.  The aspiration was dry.  Also has pain is resolving he is moving his knee better.  The left knee is likely very swollen because of the arthritic change.        Felix Guzman MD  [facility]

## 2023-12-01 NOTE — ASSESSMENT & PLAN NOTE
Chronic, uncontrolled. Latest blood pressure and vitals reviewed-     Temp:  [97.4 °F (36.3 °C)-99.1 °F (37.3 °C)]   Pulse:  [55-65]   Resp:  [14-18]   BP: (119-133)/(57-63)   SpO2:  [94 %-100 %] .   Home meds for hypertension were reviewed and noted below.   Hypertension Medications               furosemide (LASIX) 80 MG tablet Take 1 tablet (80 mg total) by mouth 2 (two) times daily.    metOLazone (ZAROXOLYN) 5 MG tablet Take 1 tablet (5 mg total) by mouth once daily.    metoprolol succinate (TOPROL-XL) 25 MG 24 hr tablet Take 25 mg by mouth once daily.            While in the hospital, will manage blood pressure as follows; Continue home antihypertensive regimen    Will utilize p.r.n. blood pressure medication only if patient's blood pressure greater than 180/110 and he develops symptoms such as worsening chest pain or shortness of breath.

## 2023-12-01 NOTE — ASSESSMENT & PLAN NOTE
History of multiple decubitus ulcers, previous buttock ulcers now healed  Has bilateral heel wound and lat rt foot  CRP 57.8,Sed rate 83,with procalcitonin elevated at 6.09  Will hold off on starting antibiotics  Podiatrist consult will see him this morning for possible bone biopsy,wound debridement and starting antibiotics  -wound cultures growing MRSA.n Started on vancomycin and ceftriaxone

## 2023-12-01 NOTE — CONSULTS
"[facility]  Infectious Disease  Consult Note    Patient Name: Chato Bowie  MRN: [id number]  Admission Date: 11/27/2023  Hospital Length of Stay: 3 days  Attending Physician: Jose L Evans III, MD  Primary Care Provider: Irlanda Valenzuela -     Isolation Status: Contact    Patient information was obtained from patient and ER records.      Inpatient consult to Infectious Diseases  Consult performed by: Sasha Goldman MD  Consult ordered by: Jose L Evans III, MD        Assessment/Plan:     ID  Osteomyelitis of foot  73M admitted 11/27 from NH for b/l wounds. 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 Wound swab of R ulcer with MRSA. Podiatry now following- Plan for OR debridement, bone biopsy tomorrow. On vanc/ceftriaxone. ID consulted for "heel osteo with mrsa infection "    Recommendations:   - continue vanc/ceftriaxone. After procedure, would add antipseudomonal and anaerobe coverage while awaiting culture results. -- vanc/cefepime/flagyl  - appreciate podiatry assistance with source control and sending path/cultures of all areas c/f OM  - needs adequate perfusion to heal  - pressure offloading        Thank you for your consult. I will follow-up with patient. Please contact us if you have any additional questions.    Sasha Goldman MD  Infectious Disease  [facility] Surg    Subjective:     Principal Problem: <principal problem not specified>    HPI: 73M admitted 11/27 from NH for wounds. Pt reports his L knee has been aching for the last 3-4 months. Also reports b/l foot wounds and some recent swelling of leg and "I barely can walk". Denies f/c. Denies abx allergies. Denies indwelling hardware. There was plan for angio today, but cancelled because intervention not thought to be beneficial at this time.     Podiatry now following- Plan for OR debridement, bone biopsy tomorrow.       MRI   1. Osteomyelitis of the bilateral posterolateral calcaneus.  2. Osteomyelitis of the left 5th " "metatarsal head and possibly right 5th metatarsal head.      R foot wound cultures-          Aerobic culture [[id number]] (Abnormal)  Collected: 11/28/23 1347   Order Status: Completed Specimen: Wound from Foot, Right Updated: 11/30/23 1041    Aerobic Bacterial Culture Results called to and read back by:CHARITY STAFFORD 11/30/2023  10:39     METHICILLIN RESISTANT STAPHYLOCOCCUS AUREUS  Many   Abnormal    Susceptibility     Methicillin resistant Staphylococcus aureus     CULTURE, AEROBIC  (SPECIFY SOURCE)     Clindamycin <=0.5 mcg/mL Sensitive     Erythromycin >4 mcg/mL Resistant     Oxacillin >2 mcg/mL Resistant     Penicillin >8 mcg/mL Resistant     Tetracycline <=4 mcg/mL Sensitive     Trimeth/Sulfa <=0.5/9.5 m... Sensitive     Vancomycin 2 mcg/mL Sensitive                   On vanc/ceftriaxone     ID consulted for "heel osteo with mrsa infection "    Past Medical History:   Diagnosis Date    Acute congestive heart failure 3/20/2020    Alcohol abuse     Arthritis     Asthma attack 11/24/2021    Coronary artery disease     Diabetes mellitus     Hyperlipemia     Hypertension     Multiple thyroid nodules 6/14/2023    Rhabdomyolysis        Past Surgical History:   Procedure Laterality Date    ECHOCARDIOGRAM,TRANSESOPHAGEAL N/A 9/21/2023    Procedure: Transesophageal echo (MARIA) intra-procedure log documentation;  Surgeon: Luisana Rodríguez MD;  Location: [facility] CATH LAB;  Service: Cardiology;  Laterality: N/A;    EYE SURGERY      TRANSESOPHAGEAL ECHOCARDIOGRAM WITH POSSIBLE CARDIOVERSION (MARIA W/ POSS CARDIOVERSION) N/A 1/5/2023    Procedure: Transesophageal echo (MARIA) intra-procedure log documentation;  Surgeon: Indra Foote MD;  Location: [facility] CATH LAB;  Service: Cardiology;  Laterality: N/A;    TRANSESOPHAGEAL ECHOCARDIOGRAPHY N/A 9/21/2023    Procedure: ECHOCARDIOGRAM, TRANSESOPHAGEAL;  Surgeon: Luisana Rodríguez MD;  Location: [facility] CATH LAB;  Service: Cardiology;  Laterality: N/A;    TREATMENT OF CARDIAC " ARRHYTHMIA N/A 12/7/2020    Procedure: Cardioversion or Defibrillation;  Surgeon: Indra Foote MD;  Location: [facility] CATH LAB;  Service: Cardiology;  Laterality: N/A;    TREATMENT OF CARDIAC ARRHYTHMIA N/A 12/30/2020    Procedure: Cardioversion or Defibrillation;  Surgeon: Tyrone Steen MD;  Location: [facility] CATH LAB;  Service: Cardiology;  Laterality: N/A;    TREATMENT OF CARDIAC ARRHYTHMIA N/A 1/5/2023    Procedure: Cardioversion or Defibrillation;  Surgeon: Indra Foote MD;  Location: [facility] CATH LAB;  Service: Cardiology;  Laterality: N/A;    VASCULAR SURGERY         Review of patient's allergies indicates:  No Known Allergies    No current facility-administered medications on file prior to encounter.     Current Outpatient Medications on File Prior to Encounter   Medication Sig    albuterol (PROVENTIL/VENTOLIN HFA) 90 mcg/actuation inhaler Inhale 2 puffs into the lungs every 6 (six) hours as needed for Wheezing or Shortness of Breath.    allopurinoL (ZYLOPRIM) 100 MG tablet Take 1 tablet (100 mg total) by mouth once daily.    amiodarone (PACERONE) 200 MG Tab Take 200 mg by mouth once daily.    ascorbic acid, vitamin C, (VITAMIN C) 500 MG tablet Take 500 mg by mouth 2 (two) times daily.    ELIQUIS 5 mg Tab Take 5 mg by mouth 2 (two) times daily.    ferrous sulfate (FEOSOL) 325 mg (65 mg iron) Tab tablet Take 325 mg by mouth once daily.    folic acid (FOLVITE) 1 MG tablet Take 1 mg by mouth once daily.    furosemide (LASIX) 80 MG tablet Take 1 tablet (80 mg total) by mouth 2 (two) times daily.    gabapentin (NEURONTIN) 100 MG capsule Take 1 capsule (100 mg total) by mouth 2 (two) times daily.    metOLazone (ZAROXOLYN) 5 MG tablet Take 1 tablet (5 mg total) by mouth once daily.    metoprolol succinate (TOPROL-XL) 25 MG 24 hr tablet Take 25 mg by mouth once daily.    multivitamin (ONE DAILY MULTIVITAMIN) per tablet Take 1 tablet by mouth once daily.    potassium chloride (MICRO-K) 10 MEQ CpSR Take 10 mEq  by mouth once daily.    rosuvastatin (CRESTOR) 40 MG Tab Take 40 mg by mouth every evening.    senna-docusate 8.6-50 mg (PERICOLACE) 8.6-50 mg per tablet Take 2 tablets by mouth 2 (two) times daily as needed for Constipation.    tamsulosin (FLOMAX) 0.4 mg Cap Take 1 capsule (0.4 mg total) by mouth once daily.    insulin aspart U-100 (NOVOLOG) 100 unit/mL injection Inject into the skin. Per sliding scale     Family History       Problem Relation (Age of Onset)    Diabetes Mother, Father, Sister    Hypertension Mother, Father, Sister          Tobacco Use    Smoking status: Never    Smokeless tobacco: Never   Substance and Sexual Activity    Alcohol use: Not Currently     Alcohol/week: 6.0 standard drinks of alcohol     Types: 6 Cans of beer per week    Drug use: No    Sexual activity: Yes     Partners: Female     Review of Systems   Constitutional:  Negative for appetite change, chills, diaphoresis and fatigue.   HENT:  Negative for congestion, sore throat and tinnitus.    Eyes:  Negative for pain, discharge and itching.   Respiratory:  Negative for cough, chest tightness, shortness of breath and wheezing.    Cardiovascular:  Positive for palpitations and leg swelling. Negative for chest pain.   Gastrointestinal:  Negative for abdominal distention, abdominal pain, blood in stool, nausea and vomiting.   Endocrine: Negative for cold intolerance, heat intolerance and polydipsia.   Genitourinary:  Negative for difficulty urinating, dysuria, flank pain, frequency and hematuria.   Musculoskeletal:  Positive for joint swelling. Negative for arthralgias, back pain and myalgias.   Skin:  Positive for rash (generalized scaly rash). Negative for color change and pallor.   Neurological:  Positive for weakness. Negative for dizziness, seizures, facial asymmetry, light-headedness, numbness and headaches.   Psychiatric/Behavioral:  Negative for agitation, confusion and hallucinations.      Objective:     Vital Signs (Most  Recent):  Temp: 98.5 °F (36.9 °C) (11/30/23 1945)  Pulse: 61 (11/30/23 1945)  Resp: 16 (11/30/23 2142)  BP: 124/60 (11/30/23 1945)  SpO2: (!) 94 % (11/30/23 1945) Vital Signs (24h Range):  Temp:  [97.8 °F (36.6 °C)-99.1 °F (37.3 °C)] 98.5 °F (36.9 °C)  Pulse:  [60-63] 61  Resp:  [14-18] 16  SpO2:  [94 %-98 %] 94 %  BP: (117-138)/(56-61) 124/60     Weight: 96.6 kg (213 lb)  Body mass index is 29.71 kg/m².     Physical Exam  Constitutional:       General: He is not in acute distress.     Appearance: He is ill-appearing. He is not toxic-appearing.   HENT:      Head: Normocephalic and atraumatic.      Nose: Nose normal. No congestion or rhinorrhea.      Mouth/Throat:      Mouth: Mucous membranes are moist.   Eyes:      Extraocular Movements: Extraocular movements intact.      Conjunctiva/sclera: Conjunctivae normal.      Pupils: Pupils are equal, round, and reactive to light.   Cardiovascular:      Rate and Rhythm: Regular rhythm. Bradycardia present.      Pulses: Normal pulses.      Heart sounds: Normal heart sounds.   Pulmonary:      Effort: Pulmonary effort is normal. No respiratory distress.      Breath sounds: Normal breath sounds. No stridor. No wheezing or rales.   Chest:      Chest wall: No tenderness.   Abdominal:      General: Abdomen is flat. Bowel sounds are normal. There is no distension.      Palpations: Abdomen is soft.      Tenderness: There is no abdominal tenderness. There is no right CVA tenderness, left CVA tenderness, guarding or rebound.   Musculoskeletal:         General: Swelling present. No tenderness.      Cervical back: Normal range of motion. No rigidity.      Right lower leg: Edema (+1) present.      Left lower leg: Edema (+2 pedal edema) present.   Skin:     General: Skin is warm and dry.      Coloration: Skin is not jaundiced.      Findings: Lesion and rash present.   Neurological:      General: No focal deficit present.      Mental Status: He is alert and oriented to person, place, and  "time. Mental status is at baseline.      Cranial Nerves: No cranial nerve deficit.      Sensory: No sensory deficit.   Psychiatric:         Mood and Affect: Mood normal.         Behavior: Behavior normal.         Thought Content: Thought content normal.              CRANIAL NERVES     CN III, IV, VI   Pupils are equal, round, and reactive to light.       Significant Labs: All pertinent labs within the past 24 hours have been reviewed.  A1C:   Recent Labs   Lab 09/20/23 0446   HGBA1C 5.5       Bilirubin:   Recent Labs   Lab 11/27/23  1509   BILITOT 0.5       Blood Culture:   No results for input(s): "LABBLOO" in the last 48 hours.    CBC:   Recent Labs   Lab 11/29/23 0437 11/30/23 0446   WBC 8.37 7.09   HGB 8.5* 8.6*   HCT 28.4* 29.0*    266       CMP:   Recent Labs   Lab 11/29/23 0437 11/30/23 0446    132*   K 2.8* 2.8*   CL 94* 92*   CO2 32* 30*   GLU 67* 78   BUN 48* 48*   CREATININE 2.1* 2.1*   CALCIUM 9.5 9.4   ANIONGAP 12 10       Cardiac Markers:   No results for input(s): "CKMB", "MYOGLOBIN", "BNP", "TROPISTAT" in the last 48 hours.    Coagulation:   No results for input(s): "PT", "INR", "APTT" in the last 48 hours.    Lactic Acid:   No results for input(s): "LACTATE" in the last 48 hours.      TSH:   Recent Labs   Lab 09/20/23 0446   TSH 3.224       Urine Studies:   No results for input(s): "COLORU", "APPEARANCEUA", "PHUR", "SPECGRAV", "PROTEINUA", "GLUCUA", "KETONESU", "BILIRUBINUA", "OCCULTUA", "NITRITE", "UROBILINOGEN", "LEUKOCYTESUR", "RBCUA", "WBCUA", "BACTERIA", "SQUAMEPITHEL", "HYALINECASTS" in the last 48 hours.    Invalid input(s): "WRIGHTSUR"      Significant Imaging: I have reviewed all pertinent imaging results/findings within the past 24 hours.  Imaging Results              X-Ray Chest AP Portable (Final result)  Result time 11/27/23 17:32:49      Final result by Jenna Owens MD (11/27/23 17:32:49)                   Impression:      Mild cardiomegaly.  Nonspecific " prominence of the interstitium.  Probable bilateral pleural effusions.  The possibility of mild CHF cannot be entirely excluded.      Electronically signed by: Jenna Owens  Date:    11/27/2023  Time:    17:32               Narrative:    EXAMINATION:  AP PORTABLE CHEST    CLINICAL HISTORY:  pre op;    TECHNIQUE:  AP portable chest radiograph was submitted.    COMPARISON:  09/23/2023    FINDINGS:  AP portable chest radiograph demonstrates mild enlargement of the cardiac silhouette.  There is tortuosity of the descending thoracic aorta.  There is mild nonspecific prominence of the interstitium.  There is subtle blunting of the costophrenic angles, which may suggest small pleural effusions and/or pleural thickening.  There is no focal consolidation or pneumothorax.                                       X-Ray Foot Complete Left (Final result)  Result time 11/27/23 17:30:04      Final result by Jenna Owens MD (11/27/23 17:30:04)                   Impression:      No acute bony abnormality detected.  Degenerative changes of the midfoot and the 1st MTP.      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    FINDINGS:  Three views of the right foot demonstrate no acute fracture or dislocation.  Degenerative changes are seen involving the 1st metatarsophalangeal joint and the midfoot.  Small Achilles and moderate plantar spurring is noted.  The bones are diffusely osteopenic.  There is advanced vascular calcifications.    Three views of the left foot demonstrate acute fracture or dislocation.  Degenerative changes are seen involving the 1st metatarsophalangeal joint and the midfoot. Small Achilles and moderate plantar  spurring is noted. The bones are diffusely osteopenic. There is advanced vascular calcifications.                                       X-Ray Foot Complete Right (Final result)  Result time 11/27/23 17:30:04      Final result by Jenna Oewns MD (11/27/23 17:30:04)                   Impression:      No acute bony abnormality detected.  Degenerative changes of the midfoot and the 1st MTP.      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    FINDINGS:  Three views of the right foot demonstrate no acute fracture or dislocation.  Degenerative changes are seen involving the 1st metatarsophalangeal joint and the midfoot.  Small Achilles and moderate plantar spurring is noted.  The bones are diffusely osteopenic.  There is advanced vascular calcifications.    Three views of the left foot demonstrate acute fracture or dislocation.  Degenerative changes are seen involving the 1st metatarsophalangeal joint and the midfoot. Small Achilles and moderate plantar spurring is noted. The bones are diffusely osteopenic. There is advanced vascular calcifications.                                       X-Ray Knee 1 or 2 View Left (Final result)  Result time 11/27/23 17:22:44      Final result by Jenna Owens MD (11/27/23 17:22:44)                   Impression:      No acute bony abnormality detected.  Large knee joint effusion.  Relatively stable degenerative changes.    Bone infarcts.      Electronically signed by: Jenna Owens  Date:    11/27/2023  Time:    17:22               Narrative:    EXAMINATION:  TWO VIEWS OF THE LEFT KNEE    CLINICAL HISTORY:  Effusion, left knee    TECHNIQUE:  AP and lateral view of the left  knee    COMPARISON:  07/31/2023    FINDINGS:  Two views of the left knee demonstrate no acute fracture or dislocation.  There is narrowing and continued sclerosing of the medial knee joint compartment.  In addition, there is narrowing of the inferior aspect of the patellofemoral space.  There is mild patellar spurring.  There is a large suprapatellar effusion.  Again seen are amorphous sclerotic densities in the visualized femur and tibia, which are likely bone infarcts.  Bones are osteopenic.  Advanced vascular calcifications are present.

## 2023-12-01 NOTE — TRANSFER OF CARE
"Anesthesia Transfer of Care Note    Patient: Chato Bowie    Procedure(s) Performed: Procedure(s) (LRB):  IRRIGATION AND DEBRIDEMENT (Bilateral)    Patient location: PACU    Transport from OR: Transported from OR on room air with adequate spontaneous ventilation    Post pain: adequate analgesia    Post assessment: tolerated procedure well and no apparent anesthetic complications    Post vital signs: stable    Level of consciousness: awake and alert    Nausea/Vomiting: no nausea/vomiting    Complications: none    Transfer of care protocol was followed    Last vitals: Visit Vitals  BP (!) 119/57 (BP Location: Right arm, Patient Position: Lying)   Pulse (!) 57   Temp 36.9 °C (98.4 °F) (Skin)   Resp 16   Ht 5' 11" (1.803 m)   Wt 96.6 kg (213 lb)   SpO2 100%   BMI 29.71 kg/m²     "

## 2023-12-01 NOTE — ANESTHESIA POSTPROCEDURE EVALUATION
Anesthesia Post Evaluation    Patient: Chato Bowie    Procedure(s) Performed: Procedure(s) (LRB):  IRRIGATION AND DEBRIDEMENT (Bilateral)    Final Anesthesia Type: MAC      Patient location during evaluation: PACU  Patient participation: Yes- Able to Participate  Level of consciousness: awake and alert  Post-procedure vital signs: reviewed and stable  Pain management: adequate  Airway patency: patent    PONV status at discharge: No PONV  Anesthetic complications: no      Cardiovascular status: hemodynamically stable  Respiratory status: unassisted and spontaneous ventilation  Hydration status: euvolemic  Follow-up not needed.              Vitals Value Taken Time   /57 12/01/23 1122   Temp 36.9 °C (98.4 °F) 12/01/23 1121   Pulse 57 12/01/23 1122   Resp 14 12/01/23 1122   SpO2 100 % 12/01/23 1122   Vitals shown include unvalidated device data.      No case tracking events are documented in the log.      Pain/Avery Score: Pain Rating Prior to Med Admin: 10 (11/30/2023  9:42 PM)  Pain Rating Post Med Admin: 0 (11/30/2023  4:32 PM)

## 2023-12-01 NOTE — OP NOTE
[facility] [place] - Med Surg  Operative Note      Date of Procedure: 12/1/2023     Procedure: Procedure(s) (LRB):  IRRIGATION AND DEBRIDEMENT (Bilateral)     Surgeon(s) and Role:     * Jenna Gutiérrez DPM - Primary    Assisting Surgeon: None    Pre-Operative Diagnosis: Multiple wounds [T07.XXXA]  Other acute osteomyelitis of foot, unspecified laterality [M86.179]    Post-Operative Diagnosis: Post-Op Diagnosis Codes:     * Multiple wounds [T07.XXXA]     * Other acute osteomyelitis of foot, unspecified laterality [M86.179]    Anesthesia: Local MAC + 20 cc total 1:1 mixture 0.5%marcaine plain 1% lidocaine plain    Description of Technical Procedures: none    The patient was brought to the operating room on a stretcher and placed on the operating table in a supine position. Following the successful induction of MAC anesthesia, a tourniquet was applied to the patients bilateral ankle. Following this, a local anesthetic block consisting of aproximately 10cc  1:1 mixture of 1% lidocaine plain + 0.5% ropivacaine plain was injected ankle . Then, both feet were scrubbed, prepped and draped in the usual aseptic manner. A marking pen was utilized to create an incision. A time out was performed. No tourniquet inflated for duration of the procedure.     Attention was then directed to the left foot  A sterile #15 blade was used to excisionally debride viable and non viable tissue on the left heel and left lateral foot. Tissue debrided through epidermis, dermis, and subcutaneous tissue. Tissue excised included epidermis, dermis, sucutaneous tissue, fibrin, biofilm, fascia included  beyond sub q utilizing a  sterile No. 3 scalpel and forceps. Minimal bleeding controlled with direct pressure. Next wounds flushed with 3L NS pulse lavage followed by 1L Vashe.     Next jamshidi needle utilized to obtain bone specimen from left calcaneus and left 5th metatarsal head. Specimen sent for micro and pathology.     Same procedure completed on RIGHT  foot. Wounds located at RIGHT heel and right lateral 5th metatarsal. Bone specimen obtained as well with jamshidi needle. Specimen sent for micro and path. Next antibiotic beads prepared and applied to all wounds both feet. Covered with adaptic wrapped with cast padding, kerlix and ACE.     The patient tolerated the procedure and anesthesia well. He was transferred to the recovery room with vital signs stable, vascular status intact, and capillary refill time < 3 seconds to both feet.Following a period of post op monitoring, the patient will be returned to inpatient status with the following written and oral post op instructions:     1. Keep dressing dry and intact   2. Complete non weightbearing to the both feet.   3. Continue pain control and medical management per Medicine team.            Significant Surgical Tasks Conducted by the Assistant(s), if Applicable: none    Estimated Blood Loss (EBL): 40 mL           Implants:   Implant Name Type Inv. Item Serial No.  Lot No. LRB No. Used Action   KIT GRAFT BONE/SUB STIM 10ML - [id number]  KIT GRAFT BONE/SUB STIM 10ML  [employer] [id number] Left 1 Implanted   KIT GRAFT BONE/SUB STIM 10ML - [id number]  KIT GRAFT BONE/SUB STIM 10ML  [employer] [id number] Right 1 Implanted       Specimens:   Specimen (24h ago, onward)       Start     Ordered    12/01/23 1051  Specimen to Pathology, Surgery General Surgery  Once        Comments: Pre-op Diagnosis: Multiple wounds [T07.XXXA]Other acute osteomyelitis of foot, unspecified laterality [M86.179]Procedure(s):IRRIGATION AND DEBRIDEMENT Number of specimens: 4Name of specimens: 1.left heel2.left 5th metatarsal3.right heel4.right 5th metatarsal     References:    Click here for ordering Quick Tip   Question Answer Comment   Procedure Type: General Surgery    Specimen Class: Routine/Screening    Which provider would you like to cc? NAE CAO    Release to patient Immediate        12/01/23 1052                             Condition: Good    Disposition: PACU - hemodynamically stable.    Attestation: I was present and scrubbed for the entire procedure.

## 2023-12-01 NOTE — PROGRESS NOTES
Pharmacokinetic Assessment Follow Up: IV Vancomycin    Vancomycin serum concentration assessment(s):    The trough level was drawn correctly and can be used to guide therapy at this time. The measurement is above the desired definitive target range of 10 to 20 mcg/mL.    Vancomycin Regimen Plan:    Discontinue the scheduled vancomycin regimen and re-dose when the random level is less than 20 mcg/mL, next level to be drawn at 04:00 on 12/02/23..    Drug levels (last 3 results):  Recent Labs   Lab Result Units 12/01/23  1457   Vancomycin-Trough ug/mL 24.8*       Pharmacy will continue to follow and monitor vancomycin.    Please contact pharmacy at extension [phone] for questions regarding this assessment.    Thank you for the consult,   Clare Hernandez       Patient brief summary:  Chato Bowie is a 73 y.o. male initiated on antimicrobial therapy with IV Vancomycin for treatment of skin & soft tissue infection      Drug Allergies:   Review of patient's allergies indicates:  No Known Allergies    Actual Body Weight:   96.6 kg    Renal Function:   Estimated Creatinine Clearance: 37.1 mL/min (A) (based on SCr of 2.1 mg/dL (H)).,     Dialysis Method (if applicable):  N/A    CBC (last 72 hours):  Recent Labs   Lab Result Units 11/29/23  0437 11/30/23  0446   WBC K/uL 8.37 7.09   Hemoglobin g/dL 8.5* 8.6*   Hematocrit % 28.4* 29.0*   Platelets K/uL 264 266       Metabolic Panel (last 72 hours):  Recent Labs   Lab Result Units 11/29/23  0437 11/30/23  0446 12/01/23  0815   Sodium mmol/L 138 132* 134*   Potassium mmol/L 2.8* 2.8* 3.1*   Chloride mmol/L 94* 92* 90*   CO2 mmol/L 32* 30* 31*   Glucose mg/dL 67* 78 88   BUN mg/dL 48* 48* 48*   Creatinine mg/dL 2.1* 2.1* 2.1*   Magnesium mg/dL 1.8 1.8  --        Vancomycin Administrations:  vancomycin given in the last 96 hours                     vancomycin 1,250 mg in dextrose 5 % (D5W) 250 mL IVPB (Vial-Mate) (mg) 1,250 mg New Bag 12/01/23 1644     1,250 mg New Bag 11/30/23 1535     vancomycin injection (g) 1 g Given 12/01/23 1043     1 g Given  1042    vancomycin (VANCOCIN) 2,000 mg in dextrose 5 % (D5W) 500 mL IVPB (mg) 2,000 mg New Bag 11/29/23 1849                    Microbiologic Results:  Microbiology Results (last 7 days)       Procedure Component Value Units Date/Time    Gram stain [[id number]] Collected: 12/01/23 1123    Order Status: Completed Specimen: Bone from Foot, Left Updated: 12/01/23 1429     Gram Stain Result Few WBC's      No organisms seen    Narrative:      Right 5th metatarsal    Gram stain [[id number]] Collected: 12/01/23 1123    Order Status: Completed Specimen: Bone from Foot, Left Updated: 12/01/23 1428     Gram Stain Result Rare WBC's      No organisms seen    Narrative:      Right heel    Gram stain [[id number]] Collected: 12/01/23 1123    Order Status: Completed Specimen: Bone from Foot, Left Updated: 12/01/23 1428     Gram Stain Result Rare WBC's      No organisms seen    Narrative:      Left 5th metatarsal    Gram stain [[id number]] Collected: 12/01/23 1123    Order Status: Completed Specimen: Bone from Foot, Left Updated: 12/01/23 1427     Gram Stain Result Rare WBC's      No organisms seen    Narrative:      Left heel    Fungus culture [[id number]] Collected: 12/01/23 1123    Order Status: No result Specimen: Bone from Foot, Left Updated: 12/01/23 1220    Aerobic culture [[id number]] Collected: 12/01/23 1123    Order Status: Sent Specimen: Bone from Foot, Left Updated: 12/01/23 1216    Culture, Anaerobe [[id number]] Collected: 12/01/23 1123    Order Status: Sent Specimen: Bone from Foot, Left Updated: 12/01/23 1216    Fungus culture [[id number]] Collected: 12/01/23 1123    Order Status: Sent Specimen: Bone from Foot, Left Updated: 12/01/23 1216    AFB Culture & Smear [[id number]] Collected: 12/01/23 1123    Order Status: Sent Specimen: Bone from Foot, Left Updated: 12/01/23 1215    Aerobic culture [[id number]] Collected: 12/01/23 1123    Order Status: Sent  Specimen: Bone from Foot, Left Updated: 12/01/23 1214    Culture, Anaerobe [[id number]] Collected: 12/01/23 1123    Order Status: Sent Specimen: Bone from Foot, Left Updated: 12/01/23 1214    Fungus culture [[id number]] Collected: 12/01/23 1123    Order Status: Sent Specimen: Bone from Foot, Left Updated: 12/01/23 1213    AFB Culture & Smear [[id number]] Collected: 12/01/23 1123    Order Status: Sent Specimen: Bone from Foot, Left Updated: 12/01/23 1213    Aerobic culture [[id number]] Collected: 12/01/23 1123    Order Status: Sent Specimen: Bone from Foot, Left Updated: 12/01/23 1210    Culture, Anaerobe [[id number]] Collected: 12/01/23 1123    Order Status: Sent Specimen: Bone from Foot, Left Updated: 12/01/23 1210    AFB Culture & Smear [[id number]] Collected: 12/01/23 1123    Order Status: Sent Specimen: Bone from Foot, Left Updated: 12/01/23 1209    Aerobic culture [[id number]] Collected: 12/01/23 1123    Order Status: Sent Specimen: Bone from Foot, Left Updated: 12/01/23 1208    Culture, Anaerobe [[id number]] Collected: 12/01/23 1123    Order Status: Sent Specimen: Bone from Foot, Left Updated: 12/01/23 1208    Fungus culture [[id number]] Collected: 12/01/23 1123    Order Status: Sent Specimen: Bone from Foot, Left Updated: 12/01/23 1207    AFB Culture & Smear [[id number]] Collected: 12/01/23 1123    Order Status: Sent Specimen: Bone from Foot, Left Updated: 12/01/23 1207    Fungus culture [[id number]]     Order Status: No result Specimen: Bone from Foot, Left     Blood culture #2 [[id number]]  (Abnormal) Collected: 11/27/23 1512    Order Status: Completed Specimen: Blood from Peripheral, Antecubital, Left Updated: 12/01/23 0537     Blood Culture, Routine Gram stain aer bottle: Gram positive cocci in clusters resembling Staph      Results called to and read back by:Kathleen Young east 11/28/2023      13:53      COAGULASE-NEGATIVE STAPHYLOCOCCUS SPECIES  Organism is a probable contaminant      Narrative:       Blood Culture #2    Blood culture #1 [[id number]] Collected: 11/27/23 1520    Order Status: Completed Specimen: Blood from Peripheral, Antecubital, Left Updated: 11/30/23 1703     Blood Culture, Routine No Growth to date      No Growth to date      No Growth to date      No Growth to date    Narrative:      Blood Culture #1    Aerobic culture [[id number]]  (Abnormal)  (Susceptibility) Collected: 11/28/23 1347    Order Status: Completed Specimen: Wound from Foot, Right Updated: 11/30/23 1041     Aerobic Bacterial Culture Results called to and read back by:CHARITY STAFFORD 11/30/2023  10:39      METHICILLIN RESISTANT STAPHYLOCOCCUS AUREUS  Many      Culture, Anaerobe [[id number]] Collected: 11/28/23 1347    Order Status: Completed Specimen: Wound from Foot, Right Updated: 11/30/23 0851     Anaerobic Culture Culture in progress    Urine culture [[id number]]  (Abnormal)  (Susceptibility) Collected: 11/27/23 1855    Order Status: Completed Specimen: Urine Updated: 11/29/23 0812     Urine Culture, Routine PROTEUS MIRABILIS  10,000 - 49,999 cfu/ml      Narrative:      Specimen Source->Urine    Gram stain [[id number]] Collected: 11/28/23 1347    Order Status: Completed Specimen: Wound from Foot, Right Updated: 11/28/23 1502     Gram Stain Result Few WBC's      Many Gram positive cocci in pairs

## 2023-12-01 NOTE — ASSESSMENT & PLAN NOTE
Patient's FSGs are controlled on current medication regimen.  Last A1c reviewed-   Lab Results   Component Value Date    HGBA1C 5.5 09/20/2023     Most recent fingerstick glucose reviewed-   Recent Labs   Lab 11/30/23  1620 11/30/23  1952 12/01/23  0721 12/01/23  1121   POCTGLUCOSE 151* 104 88 92       Current correctional scale  Low  Low hemoglobin A1C likely due to worsening kidney functions  Antihyperglycemics (From admission, onward)    Start     Stop Route Frequency Ordered    11/28/23 0900  insulin detemir U-100 (Levemir) pen 8 Units         -- SubQ 2 times daily 11/28/23 0241    11/28/23 0715  insulin aspart U-100 pen 2 Units         -- SubQ 3 times daily with meals 11/28/23 0241    11/28/23 0334  insulin aspart U-100 pen 0-5 Units         -- SubQ Before meals & nightly PRN 11/28/23 0241        Hold Oral hypoglycemics while patient is in the hospital.  Will start basal,bolus and correctional insulin as needed

## 2023-12-01 NOTE — NURSING
Patient arrived to floor via bed. Dressing to bilateral feet clean dry ad intact. VS stable. Patient has no complaints at this time.

## 2023-12-01 NOTE — ASSESSMENT & PLAN NOTE
"73M admitted 11/27 from NH for b/l wounds. 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 Wound swab of R ulcer with MRSA. Podiatry now following- Plan for OR debridement, bone biopsy tomorrow. On vanc/ceftriaxone. ID consulted for "heel osteo with mrsa infection "    Recommendations:   - continue vanc/ceftriaxone. After procedure, would add antipseudomonal and anaerobe coverage while awaiting culture results. -- vanc/cefepime/flagyl  - appreciate podiatry assistance with source control and sending path/cultures of all areas c/f OM  - needs adequate perfusion to heal  - pressure offloading  "

## 2023-12-01 NOTE — PROGRESS NOTES
[facility] - Med Surg  Podiatry  Progress Note    Patient Name: Chato Bowie  MRN: [id number]  Admission Date: 11/27/2023  Hospital Length of Stay: 4 days  Attending Physician: Jose L Evans III, MD  Primary Care Provider: Irlanda Valenzuela -     Consults  Subjective:     History of Present Illness: 73 y /o male PMH DM2 Admitted for multiple wounds and left knee pain. Patient currently resides in NH reports wounds present x several months. Reports pain worse R>L. Heel protector boots present.     11/30/23: Patient seen bedside. Heel protector boots in place B/L. Angio scheduled for today with Dr. Yeboah however now canceled as vascular did not feel intervention would be beneficial to the pt at this time.     MRI revealing OM B/L calcaneus Left 5th metatarsal possible OM right 5th metatarsal.    12/1/23:     Scheduled Meds:   amiodarone  200 mg Oral Daily    aspirin  81 mg Oral Daily    atorvastatin  40 mg Oral QHS    cefTRIAXone (ROCEPHIN) IVPB  2 g Intravenous Q24H    ferrous sulfate  1 tablet Oral Daily    folic acid  1 mg Oral Daily    furosemide (LASIX) injection  40 mg Intravenous BID    insulin aspart U-100  2 Units Subcutaneous TIDWM    insulin detemir U-100  8 Units Subcutaneous BID    metOLazone  5 mg Oral Daily    metoprolol succinate  12.5 mg Oral Daily    mupirocin   Nasal BID    potassium chloride  40 mEq Oral Once    tamsulosin  0.4 mg Oral Daily    vancomycin (VANCOCIN) IV (PEDS and ADULTS)  1,250 mg Intravenous Q24H     Continuous Infusions:  PRN Meds:acetaminophen, acetaminophen, cadexomer iodine, dextrose 10%, dextrose 10%, glucagon (human recombinant), glucose, glucose, HYDROcodone-acetaminophen, HYDROmorphone, insulin aspart U-100, melatonin, naloxone, ondansetron, ondansetron, oxyCODONE-acetaminophen, potassium bicarbonate, potassium bicarbonate, senna-docusate 8.6-50 mg, simethicone, sodium chloride 0.9%, Pharmacy to dose Vancomycin consult **AND** vancomycin - pharmacy to dose    Review  of patient's allergies indicates:  No Known Allergies     Past Medical History:   Diagnosis Date    Acute congestive heart failure 3/20/2020    Alcohol abuse     Arthritis     Asthma attack 11/24/2021    Coronary artery disease     Diabetes mellitus     Hyperlipemia     Hypertension     Multiple thyroid nodules 6/14/2023    Rhabdomyolysis      Past Surgical History:   Procedure Laterality Date    ECHOCARDIOGRAM,TRANSESOPHAGEAL N/A 9/21/2023    Procedure: Transesophageal echo (MARIA) intra-procedure log documentation;  Surgeon: Luisana Rodríguez MD;  Location: [facility] CATH LAB;  Service: Cardiology;  Laterality: N/A;    EYE SURGERY      TRANSESOPHAGEAL ECHOCARDIOGRAM WITH POSSIBLE CARDIOVERSION (MARIA W/ POSS CARDIOVERSION) N/A 1/5/2023    Procedure: Transesophageal echo (MARIA) intra-procedure log documentation;  Surgeon: Indra Foote MD;  Location: [facility] CATH LAB;  Service: Cardiology;  Laterality: N/A;    TRANSESOPHAGEAL ECHOCARDIOGRAPHY N/A 9/21/2023    Procedure: ECHOCARDIOGRAM, TRANSESOPHAGEAL;  Surgeon: Luisana Rodríguez MD;  Location: [facility] CATH LAB;  Service: Cardiology;  Laterality: N/A;    TREATMENT OF CARDIAC ARRHYTHMIA N/A 12/7/2020    Procedure: Cardioversion or Defibrillation;  Surgeon: Indra Foote MD;  Location: [facility] CATH LAB;  Service: Cardiology;  Laterality: N/A;    TREATMENT OF CARDIAC ARRHYTHMIA N/A 12/30/2020    Procedure: Cardioversion or Defibrillation;  Surgeon: Tyrone Steen MD;  Location: [facility] CATH LAB;  Service: Cardiology;  Laterality: N/A;    TREATMENT OF CARDIAC ARRHYTHMIA N/A 1/5/2023    Procedure: Cardioversion or Defibrillation;  Surgeon: Indra Foote MD;  Location: [facility] CATH LAB;  Service: Cardiology;  Laterality: N/A;    VASCULAR SURGERY         Family History       Problem Relation (Age of Onset)    Diabetes Mother, Father, Sister    Hypertension Mother, Father, Sister          Tobacco Use    Smoking status: Never    Smokeless tobacco: Never   Substance and Sexual  Activity    Alcohol use: Not Currently     Alcohol/week: 6.0 standard drinks of alcohol     Types: 6 Cans of beer per week    Drug use: No    Sexual activity: Yes     Partners: Female     Review of Systems   Constitutional:  Positive for activity change.   Respiratory:  Negative for shortness of breath.    Cardiovascular:  Negative for chest pain and leg swelling.   Gastrointestinal:  Negative for nausea and vomiting.   Musculoskeletal:  Positive for arthralgias.   Skin:  Positive for wound.   Neurological:  Positive for numbness. Negative for weakness.     Objective:     Vital Signs (Most Recent):  Temp: 98.5 °F (36.9 °C) (12/01/23 1247)  Pulse: (!) 57 (12/01/23 1247)  Resp: 18 (12/01/23 1247)  BP: 133/60 (12/01/23 1247)  SpO2: 99 % (12/01/23 1247) Vital Signs (24h Range):  Temp:  [97.4 °F (36.3 °C)-99.1 °F (37.3 °C)] 98.5 °F (36.9 °C)  Pulse:  [55-65] 57  Resp:  [14-18] 18  SpO2:  [94 %-100 %] 99 %  BP: (119-133)/(57-63) 133/60     Weight: 96.6 kg (213 lb)  Body mass index is 29.71 kg/m².    Foot Exam    General  Orientation: alert and oriented to person, place, and time       Right Foot/Ankle     Inspection and Palpation  Skin Exam: ulcer;     Neurovascular  Dorsalis pedis: 1+  Posterior tibial: 1+  Saphenous nerve sensation: diminished  Tibial nerve sensation: diminished  Superficial peroneal nerve sensation: diminished  Deep peroneal nerve sensation: diminished  Sural nerve sensation: diminished      Left Foot/Ankle      Inspection and Palpation  Skin Exam: ulcer;     Neurovascular  Dorsalis pedis: 1+  Posterior tibial: 1+  Saphenous nerve sensation: diminished  Tibial nerve sensation: diminished  Superficial peroneal nerve sensation: diminished  Deep peroneal nerve sensation: diminished  Sural nerve sensation: diminished        11/30/23:    Right heel       Right lateral foot       Left foot       Left lateral foot          11/28/23:  Right lateral foot post debridement purulent drainage noted.       Left foot  pre debridement       Stable eschar left       Stable eschar right           S/p debridement left foot.         Laboratory:  CBC:   Recent Labs   Lab 11/30/23  0446   WBC 7.09   RBC 3.78*   HGB 8.6*   HCT 29.0*      MCV 77*   MCH 22.8*   MCHC 29.7*       CMP:   Recent Labs   Lab 11/27/23  1509 11/28/23  0520 12/01/23  0815   *   < > 88   CALCIUM 9.5   < > 9.7   ALBUMIN 2.6*  --   --    PROT 7.0  --   --       < > 134*   K 3.0*   < > 3.1*   CO2 29   < > 31*   CL 94*   < > 90*   BUN 51*   < > 48*   CREATININE 2.2*   < > 2.1*   ALKPHOS 100  --   --    ALT 38  --   --    AST 28  --   --    BILITOT 0.5  --   --     < > = values in this interval not displayed.         Diagnostic Results:  X-Ray Foot Complete Right  Order: [id number]  Status: Final result       Visible to patient: No (inaccessible in Patient Portal)       Next appt: 11/30/2023 at 11:00 AM in Interventional Radiology ([facility] IR1)       Dx: Foot ulcer    0 Result Notes  Details    Reading Physician Reading Date Result Priority   Jenna Owens MD  [phone] 11/27/2023 STAT     Narrative & Impression  EXAMINATION:  THREE VIEWS OF THE BILATERAL FEET     CLINICAL HISTORY:  Non-pressure chronic ulcer of other part of unspecified foot with unspecified severity     TECHNIQUE:  AP, lateral, and oblique view of the both feet     COMPARISON:  None.     FINDINGS:  Three views of the right foot demonstrate no acute fracture or dislocation.  Degenerative changes are seen involving the 1st metatarsophalangeal joint and the midfoot.  Small Achilles and moderate plantar spurring is noted.  The bones are diffusely osteopenic.  There is advanced vascular calcifications.     Three views of the left foot demonstrate acute fracture or dislocation.  Degenerative changes are seen involving the 1st metatarsophalangeal joint and the midfoot. Small Achilles and moderate plantar spurring is noted. The bones are diffusely osteopenic. There is advanced  vascular calcifications.     Impression:     No acute bony abnormality detected.  Degenerative changes of the midfoot and the 1st MTP.        X-Ray Foot Complete Left  Order: [id number]  Status: Final result       Visible to patient: No (inaccessible in Patient Portal)       Next appt: 11/30/2023 at 11:00 AM in Interventional Radiology ([facility] IR1)       Dx: Foot ulcer    0 Result Notes  Details    Reading Physician Reading Date Result Priority   Jenna Owens MD  [phone] 11/27/2023      Narrative & Impression  EXAMINATION:  THREE VIEWS OF THE BILATERAL FEET     CLINICAL HISTORY:  Non-pressure chronic ulcer of other part of unspecified foot with unspecified severity     TECHNIQUE:  AP, lateral, and oblique view of the both feet     COMPARISON:  None.     FINDINGS:  Three views of the right foot demonstrate no acute fracture or dislocation.  Degenerative changes are seen involving the 1st metatarsophalangeal joint and the midfoot.  Small Achilles and moderate plantar spurring is noted.  The bones are diffusely osteopenic.  There is advanced vascular calcifications.     Three views of the left foot demonstrate acute fracture or dislocation.  Degenerative changes are seen involving the 1st metatarsophalangeal joint and the midfoot. Small Achilles and moderate plantar spurring is noted. The bones are diffusely osteopenic. There is advanced vascular calcifications.     Impression:     No acute bony abnormality detected.  Degenerative changes of the midfoot and the 1st MTP.        MRI : MRI Foot ([place]) Left Without Contrast  Order: [id number]  Status: Final result       Visible to patient: No (inaccessible in Patient Portal)       Next appt: 12/14/2023 at 02:20 PM in Urology (Amirah Schneider MD)       Dx: Foot ulcer    0 Result Notes  Details    Reading Physician Reading Date Result Priority   Desmond Najera MD  [phone] 11/29/2023      Narrative & Impression  EXAMINATION:  MRI FOOT (FOREFOOT) LEFT  WITHOUT CONTRAST; MRI FOOT (HINDFOOT) LEFT WITHOUT CONTRAST; MRI FOOT (HINDFOOT) RIGHT WITHOUT CONTRAST; MRI FOOT (FOREFOOT) RIGHT WITHOUT CONTRAST     CLINICAL HISTORY:  Foot swelling, diabetic, osteomyelitis suspected, xray done;r/o Left lateral foot ulceration;; Foot swelling, diabetic, osteomyelitis suspected, xray done;Left heel decubitus r/o OM;; Foot swelling, diabetic, osteomyelitis suspected, xray done;r/o OM right heel;; Foot swelling, diabetic, osteomyelitis suspected, xray done;r/o OM, abscess right lateral foot.;  Non-pressure chronic ulcer of other part of unspecified foot with unspecified severity     TECHNIQUE:  MRI of the right forefoot and hindfoot without contrast.     MRI of the left forefoot and hindfoot without contrast     COMPARISON:  Radiographs dated 11/27/2023     FINDINGS:  RIGHT:     There is ulceration over the 5th metatarsal head with subjacent bone marrow edema.  No geographic marrow replacement or definite erosion.  If ulcer probes to bone, appearance may be seen with early osteomyelitis.     There is large ulceration over the posterolateral calcaneus.  There is bone marrow edema and mild T1 hypointensity of the subjacent calcaneus in keeping with early osteomyelitis.     No fracture.  No evidence for diffuse marrow infiltrative process.  Ankle tendons and ligaments are unremarkable.  There is thickening of the plantar fascia central cord in keeping with plantar fasciitis.  There is moderate to severe atrophy of intrinsic foot musculature.  There is generalized subcutaneous edema.     LEFT:     There is ulceration over the 5th metatarsal head with subjacent bone marrow edema and mild T1 hypointensity in keeping with early osteomyelitis.     There is large ulceration over the posterolateral calcaneus.  There is bone marrow edema and mild T1 hypointensity of the subjacent calcaneus in keeping with early osteomyelitis.     Note made of subchondral bone marrow edema at the 4th and 5th  TMT joint, likely reactive.     No fracture.  No evidence for diffuse marrow infiltrative process.  There is a longitudinal split tear of the peroneus brevis tendon.  There is thickening of the plantar fascia central cord in keeping with plantar fasciitis.  There is moderate to severe atrophy of intrinsic foot musculature.  There is generalized subcutaneous edema.     Impression:     1. Osteomyelitis of the bilateral posterolateral calcaneus.  2. Osteomyelitis of the left 5th metatarsal head and possibly right 5th metatarsal head.  3. Additional findings above.        Clinical Findings:  B/L heel decubitus stable .   Assessment/Plan:     Active Diagnoses:    Diagnosis Date Noted POA    PRINCIPAL PROBLEM:  Osteomyelitis of foot [M86.9] 11/30/2023 Yes    Chronic diastolic heart failure [I50.32] 11/28/2023 Yes    Atherosclerosis of native artery of extremity [I70.209] 11/28/2023 Yes    Multiple wounds [T07.XXXA] 07/28/2023 Yes     Chronic    UTI (urinary tract infection) [N39.0] 09/05/2021 Yes    Paroxysmal atrial flutter [I48.92] 12/07/2020 Yes     Chronic    BPH (benign prostatic hyperplasia) [N40.0] 11/24/2020 Yes    Stage 3b chronic kidney disease (CKD) [N18.32] 11/24/2020 Yes    Left knee pain [M25.562] 03/23/2020 Yes    Alcohol abuse [F10.10] 02/14/2019 Yes    Type 2 diabetes mellitus with kidney complication, with long-term current use of insulin [E11.29, Z79.4] 03/10/2017 Not Applicable     Chronic    GERD (gastroesophageal reflux disease) [K21.9] 03/10/2017 Yes     Chronic    Essential hypertension [I10] 03/10/2017 Yes    Anemia [D64.9] 03/10/2017 Yes    Dyslipidemia [E78.5] 03/10/2017 Yes      Problems Resolved During this Admission:    Diagnosis Date Noted Date Resolved POA    DJD (degenerative joint disease) [M19.90] 06/14/2023 11/30/2023 Yes       MRI revealing OM B/L calcaneus Left 5th metatarsal possible OM right 5th metatarsal.    Discussed two options with patient. Proximal amputation  vs  debridement,  IV abx for six weeks with aggressive wound care for several months with no guarantee of wound resolution.  Patient would like to try wound care, IV abx    Plan for OR debridement, bone biopsy today.     The risks, benefits, complications, treatment options, and expected outcomes were discussed with the patient. The risks and potential complications of their problem and purposed treatment include but are not limited to infection, nerve injury, vascular injury, persistent pain, potential skin necrosis, deep vein thrombosis, possible pulmonary embolus,and complications of the anesthetics. Informed verbal and written consent was obtained. Consent forms read, signed, witnessed.      Vascular surgery consulted- no plans for intervention per Dr. Yeboah.     Abx per ID    Nursing orders placed for daily dressing changes.     DSD applied.     Thank you for your consult. I will follow-up with patient. Please contact us if you have any additional questions.    Jenna Gutiérrez DPM  Podiatry  [facility] - [place] - Med Surg

## 2023-12-01 NOTE — NURSING
Pt lying in bed, dressing to bilateral feet in clean, dry and intact. External urinary condom catheter in place, drainage bag in place. Turned and repositioned pt with wedge and pillows. Call light in reach, bed alarm set, instructed pt to call for assistance.     [facility] [place] 4 [place]      11/30/2023       Skin assessed on: Q Shift      [] No Pressure Injuries Present    []Prevention Measures Documented    [x] Yes LDA  for Pressure Injury Previously documented     [] Yes New Pressure Injury Discovered   [] LDA for New Pressure Injury Added      Attending RN:  Anastasia Hernandez RN     Second RN:  JOYCE Albarran

## 2023-12-02 LAB
ANION GAP SERPL CALC-SCNC: 16 MMOL/L (ref 8–16)
BACTERIA SPEC ANAEROBE CULT: NORMAL
BASOPHILS # BLD AUTO: 0.07 K/UL (ref 0–0.2)
BASOPHILS NFR BLD: 0.9 % (ref 0–1.9)
BUN SERPL-MCNC: 54 MG/DL (ref 8–23)
CALCIUM SERPL-MCNC: 9.8 MG/DL (ref 8.7–10.5)
CHLORIDE SERPL-SCNC: 90 MMOL/L (ref 95–110)
CO2 SERPL-SCNC: 29 MMOL/L (ref 23–29)
CREAT SERPL-MCNC: 2.2 MG/DL (ref 0.5–1.4)
DIFFERENTIAL METHOD: ABNORMAL
EOSINOPHIL # BLD AUTO: 0.5 K/UL (ref 0–0.5)
EOSINOPHIL NFR BLD: 6.1 % (ref 0–8)
ERYTHROCYTE [DISTWIDTH] IN BLOOD BY AUTOMATED COUNT: 16.1 % (ref 11.5–14.5)
EST. GFR  (NO RACE VARIABLE): 31 ML/MIN/1.73 M^2
GLUCOSE SERPL-MCNC: 116 MG/DL (ref 70–110)
HCT VFR BLD AUTO: 27.8 % (ref 40–54)
HGB BLD-MCNC: 8.4 G/DL (ref 14–18)
IMM GRANULOCYTES # BLD AUTO: 0.03 K/UL (ref 0–0.04)
IMM GRANULOCYTES NFR BLD AUTO: 0.4 % (ref 0–0.5)
LYMPHOCYTES # BLD AUTO: 1.1 K/UL (ref 1–4.8)
LYMPHOCYTES NFR BLD: 15.4 % (ref 18–48)
MCH RBC QN AUTO: 23.3 PG (ref 27–31)
MCHC RBC AUTO-ENTMCNC: 30.2 G/DL (ref 32–36)
MCV RBC AUTO: 77 FL (ref 82–98)
MONOCYTES # BLD AUTO: 1.2 K/UL (ref 0.3–1)
MONOCYTES NFR BLD: 16.2 % (ref 4–15)
NEUTROPHILS # BLD AUTO: 4.5 K/UL (ref 1.8–7.7)
NEUTROPHILS NFR BLD: 61 % (ref 38–73)
NRBC BLD-RTO: 0 /100 WBC
PLATELET # BLD AUTO: 268 K/UL (ref 150–450)
PMV BLD AUTO: 8.4 FL (ref 9.2–12.9)
POCT GLUCOSE: 111 MG/DL (ref 70–110)
POCT GLUCOSE: 131 MG/DL (ref 70–110)
POCT GLUCOSE: 140 MG/DL (ref 70–110)
POCT GLUCOSE: 83 MG/DL (ref 70–110)
POTASSIUM SERPL-SCNC: 3 MMOL/L (ref 3.5–5.1)
RBC # BLD AUTO: 3.6 M/UL (ref 4.6–6.2)
SODIUM SERPL-SCNC: 135 MMOL/L (ref 136–145)
VANCOMYCIN SERPL-MCNC: 22.9 UG/ML
WBC # BLD AUTO: 7.4 K/UL (ref 3.9–12.7)

## 2023-12-02 PROCEDURE — 80048 BASIC METABOLIC PNL TOTAL CA: CPT | Performed by: STUDENT IN AN ORGANIZED HEALTH CARE EDUCATION/TRAINING PROGRAM

## 2023-12-02 PROCEDURE — 36415 COLL VENOUS BLD VENIPUNCTURE: CPT | Performed by: STUDENT IN AN ORGANIZED HEALTH CARE EDUCATION/TRAINING PROGRAM

## 2023-12-02 PROCEDURE — 94760 N-INVAS EAR/PLS OXIMETRY 1: CPT

## 2023-12-02 PROCEDURE — 63600175 PHARM REV CODE 636 W HCPCS: Performed by: STUDENT IN AN ORGANIZED HEALTH CARE EDUCATION/TRAINING PROGRAM

## 2023-12-02 PROCEDURE — 25000003 PHARM REV CODE 250: Performed by: STUDENT IN AN ORGANIZED HEALTH CARE EDUCATION/TRAINING PROGRAM

## 2023-12-02 PROCEDURE — 85025 COMPLETE CBC W/AUTO DIFF WBC: CPT | Performed by: STUDENT IN AN ORGANIZED HEALTH CARE EDUCATION/TRAINING PROGRAM

## 2023-12-02 PROCEDURE — 25000003 PHARM REV CODE 250: Performed by: SURGERY

## 2023-12-02 PROCEDURE — 80202 ASSAY OF VANCOMYCIN: CPT | Performed by: STUDENT IN AN ORGANIZED HEALTH CARE EDUCATION/TRAINING PROGRAM

## 2023-12-02 PROCEDURE — 11000001 HC ACUTE MED/SURG PRIVATE ROOM

## 2023-12-02 PROCEDURE — 27000207 HC ISOLATION

## 2023-12-02 RX ORDER — FUROSEMIDE 40 MG/1
40 TABLET ORAL 2 TIMES DAILY
Status: DISCONTINUED | OUTPATIENT
Start: 2023-12-02 | End: 2023-12-02

## 2023-12-02 RX ORDER — FUROSEMIDE 40 MG/1
40 TABLET ORAL DAILY
Status: DISCONTINUED | OUTPATIENT
Start: 2023-12-03 | End: 2023-12-08 | Stop reason: HOSPADM

## 2023-12-02 RX ORDER — METRONIDAZOLE 500 MG/100ML
500 INJECTION, SOLUTION INTRAVENOUS
Status: DISCONTINUED | OUTPATIENT
Start: 2023-12-02 | End: 2023-12-06

## 2023-12-02 RX ORDER — LANOLIN ALCOHOL/MO/W.PET/CERES
1 CREAM (GRAM) TOPICAL 2 TIMES DAILY
Status: DISCONTINUED | OUTPATIENT
Start: 2023-12-02 | End: 2023-12-08 | Stop reason: HOSPADM

## 2023-12-02 RX ORDER — POTASSIUM CHLORIDE 20 MEQ/1
40 TABLET, EXTENDED RELEASE ORAL 2 TIMES DAILY
Status: COMPLETED | OUTPATIENT
Start: 2023-12-02 | End: 2023-12-03

## 2023-12-02 RX ADMIN — FERROUS SULFATE TAB 325 MG (65 MG ELEMENTAL FE) 1 EACH: 325 (65 FE) TAB at 08:12

## 2023-12-02 RX ADMIN — MUPIROCIN: 20 OINTMENT TOPICAL at 08:12

## 2023-12-02 RX ADMIN — FUROSEMIDE 40 MG: 40 TABLET ORAL at 08:12

## 2023-12-02 RX ADMIN — TAMSULOSIN HYDROCHLORIDE 0.4 MG: 0.4 CAPSULE ORAL at 08:12

## 2023-12-02 RX ADMIN — FOLIC ACID 1 MG: 1 TABLET ORAL at 08:12

## 2023-12-02 RX ADMIN — MUPIROCIN: 20 OINTMENT TOPICAL at 10:12

## 2023-12-02 RX ADMIN — POTASSIUM CHLORIDE 40 MEQ: 1500 TABLET, EXTENDED RELEASE ORAL at 05:12

## 2023-12-02 RX ADMIN — HYDROMORPHONE HYDROCHLORIDE 0.5 MG: 1 INJECTION, SOLUTION INTRAMUSCULAR; INTRAVENOUS; SUBCUTANEOUS at 07:12

## 2023-12-02 RX ADMIN — METRONIDAZOLE 500 MG: 500 INJECTION, SOLUTION INTRAVENOUS at 09:12

## 2023-12-02 RX ADMIN — OXYCODONE HYDROCHLORIDE AND ACETAMINOPHEN 1 TABLET: 10; 325 TABLET ORAL at 06:12

## 2023-12-02 RX ADMIN — OXYCODONE HYDROCHLORIDE AND ACETAMINOPHEN 1 TABLET: 10; 325 TABLET ORAL at 04:12

## 2023-12-02 RX ADMIN — METOPROLOL SUCCINATE 12.5 MG: 25 TABLET, EXTENDED RELEASE ORAL at 08:12

## 2023-12-02 RX ADMIN — METOLAZONE 5 MG: 5 TABLET ORAL at 08:12

## 2023-12-02 RX ADMIN — FERROUS SULFATE TAB 325 MG (65 MG ELEMENTAL FE) 1 EACH: 325 (65 FE) TAB at 10:12

## 2023-12-02 RX ADMIN — INSULIN ASPART 2 UNITS: 100 INJECTION, SOLUTION INTRAVENOUS; SUBCUTANEOUS at 04:12

## 2023-12-02 RX ADMIN — APIXABAN 5 MG: 5 TABLET, FILM COATED ORAL at 11:12

## 2023-12-02 RX ADMIN — CEFEPIME 2 G: 2 INJECTION, POWDER, FOR SOLUTION INTRAVENOUS at 08:12

## 2023-12-02 RX ADMIN — METRONIDAZOLE 500 MG: 500 INJECTION, SOLUTION INTRAVENOUS at 04:12

## 2023-12-02 RX ADMIN — ASPIRIN 81 MG CHEWABLE TABLET 81 MG: 81 TABLET CHEWABLE at 08:12

## 2023-12-02 RX ADMIN — Medication 6 MG: at 10:12

## 2023-12-02 RX ADMIN — AMIODARONE HYDROCHLORIDE 200 MG: 200 TABLET ORAL at 08:12

## 2023-12-02 RX ADMIN — INSULIN DETEMIR 8 UNITS: 100 INJECTION, SOLUTION SUBCUTANEOUS at 10:12

## 2023-12-02 RX ADMIN — APIXABAN 5 MG: 5 TABLET, FILM COATED ORAL at 10:12

## 2023-12-02 RX ADMIN — CEFEPIME 2 G: 2 INJECTION, POWDER, FOR SOLUTION INTRAVENOUS at 10:12

## 2023-12-02 RX ADMIN — INSULIN ASPART 2 UNITS: 100 INJECTION, SOLUTION INTRAVENOUS; SUBCUTANEOUS at 11:12

## 2023-12-02 RX ADMIN — ATORVASTATIN CALCIUM 40 MG: 40 TABLET, FILM COATED ORAL at 10:12

## 2023-12-02 NOTE — NURSING
[facility], [facility] [place]  Nurses Note -- 4 Eyes      12/2/2023       Skin assessed on: Q Shift      [] No Pressure Injuries Present    []Prevention Measures Documented    [x] Yes LDA  for Pressure Injury Previously documented     [] Yes New Pressure Injury Discovered   [] LDA for New Pressure Injury Added      Attending RN:  Samantha Keith RN     Second RN:  JOYCE Oconnor

## 2023-12-02 NOTE — ASSESSMENT & PLAN NOTE
History of multiple decubitus ulcers, previous buttock ulcers now healed  Has bilateral heel wound and lat rt foot  CRP 57.8,Sed rate 83,with procalcitonin elevated at 6.09  Will hold off on starting antibiotics  Podiatrist consult will see him this morning for possible bone biopsy,wound debridement and starting antibiotics  -wound cultures growing MRSA. Started on vancomycin/cefepime/flagyl per id while pending cultures.

## 2023-12-02 NOTE — PLAN OF CARE
Problem: Adult Inpatient Plan of Care  Goal: Plan of Care Review  Outcome: Ongoing, Progressing  Goal: Patient-Specific Goal (Individualized)  Outcome: Ongoing, Progressing  Goal: Absence of Hospital-Acquired Illness or Injury  Outcome: Ongoing, Progressing  Goal: Optimal Comfort and Wellbeing  Outcome: Ongoing, Progressing  Goal: Readiness for Transition of Care  Outcome: Ongoing, Progressing     Problem: Skin Injury Risk Increased  Goal: Skin Health and Integrity  Outcome: Ongoing, Progressing  Intervention: Optimize Skin Protection  Flowsheets (Taken 12/2/2023 1225)  Pressure Reduction Techniques:   frequent weight shift encouraged   weight shift assistance provided  Skin Protection: adhesive use limited  Head of Bed (HOB) Positioning: HOB lowered  Intervention: Promote and Optimize Oral Intake  Flowsheets (Taken 12/2/2023 1225)  Oral Nutrition Promotion: calorie-dense foods provided     Problem: Diabetes Comorbidity  Goal: Blood Glucose Level Within Targeted Range  Outcome: Ongoing, Progressing  Intervention: Monitor and Manage Glycemia  Flowsheets (Taken 12/2/2023 1225)  Glycemic Management: blood glucose monitored     Problem: Impaired Wound Healing  Goal: Optimal Wound Healing  Outcome: Ongoing, Progressing  Intervention: Promote Wound Healing  Flowsheets (Taken 12/2/2023 1225)  Oral Nutrition Promotion: calorie-dense foods provided  Sleep/Rest Enhancement: regular sleep/rest pattern promoted  Activity Management: Rolling - L1  Pain Management Interventions: pain management plan reviewed with patient/caregiver     Problem: Infection  Goal: Absence of Infection Signs and Symptoms  Outcome: Ongoing, Progressing  Intervention: Prevent or Manage Infection  Flowsheets (Taken 12/2/2023 1225)  Infection Management: aseptic technique maintained  Isolation Precautions:   contact   precautions maintained

## 2023-12-02 NOTE — SUBJECTIVE & OBJECTIVE
Interval History: Pt states he feels fine today. No complaints. Pain controlled. Pending cultures.    Review of Systems  Objective:     Vital Signs (Most Recent):  Temp: 97.5 °F (36.4 °C) (12/02/23 0751)  Pulse: 67 (12/02/23 0751)  Resp: 19 (12/02/23 0751)  BP: (!) 145/69 (12/02/23 0751)  SpO2: 100 % (12/02/23 0812) Vital Signs (24h Range):  Temp:  [97.5 °F (36.4 °C)-98.5 °F (36.9 °C)] 97.5 °F (36.4 °C)  Pulse:  [55-70] 67  Resp:  [14-19] 19  SpO2:  [95 %-100 %] 100 %  BP: (109-150)/(56-70) 145/69     Weight: 96.6 kg (213 lb)  Body mass index is 29.71 kg/m².    Intake/Output Summary (Last 24 hours) at 12/2/2023 0913  Last data filed at 12/2/2023 0755  Gross per 24 hour   Intake 545.38 ml   Output 1490 ml   Net -944.62 ml         Physical Exam  Vitals reviewed.   Constitutional:       General: He is not in acute distress.  HENT:      Head: Normocephalic and atraumatic.      Mouth/Throat:      Mouth: Mucous membranes are moist.      Pharynx: Oropharynx is clear.   Eyes:      General: No scleral icterus.     Extraocular Movements: Extraocular movements intact.   Abdominal:      General: There is no distension.      Palpations: Abdomen is soft.      Tenderness: There is no abdominal tenderness. There is no guarding or rebound.   Musculoskeletal:         General: No swelling.      Cervical back: Neck supple. No rigidity.   Skin:     General: Skin is warm and dry.   Neurological:      General: No focal deficit present.      Mental Status: He is alert and oriented to person, place, and time.   Psychiatric:         Mood and Affect: Mood normal.         Behavior: Behavior normal.             Significant Labs: All pertinent labs within the past 24 hours have been reviewed.    Significant Imaging: I have reviewed all pertinent imaging results/findings within the past 24 hours.

## 2023-12-02 NOTE — PROGRESS NOTES
[facility]  Progress Note    Patient Name: Chato Bowie  MRN: [id number]  Patient Class: IP- Inpatient   Admission Date: 11/27/2023  Length of Stay: 5 days  Attending Physician: Jose L Evans III, MD  Primary Care Provider: Irlanda Valenzuela -        Subjective:     Principal Problem:Osteomyelitis of foot        HPI:  73-year-old  male,current a nursing home resident,who was brought to the ED for evaluation after receiving x-ray imaging obtained 5 days earlier with concern for possible osteomyelitis,prior to obtaining the knee and foot x-ray,he had complained of left knee pain,he also has bilateral heel decubitus ulcers,wound is covered with escar,and can not be staged,although it was reported that he has osteomyelitis but the xray obtained did not show evidence of osteo per report,pain in the knee is sharp,non radiating,could be 10 out 10 in severity some time,currently only able to move around in a wheel chair,he denied fever,chills or rigor,although reports straining at micturition and urgency but denied dysuria,frequency or hematuria.He denied chest pain,shortness of breath,or orthopnea,although he has chronic bilateral lower extremities swelling,worse in the left lower extremity compared to the right,he denied previous history of lower extremity deep vein thrombosis.He denied change in bowel habit,no diarrhea or constipation.  His medical history os significant for type 2 DM,hypertension,hyperlipidemia,heart failure,coronary artery disease, hyperlipidemia,alcohol abuse,CKD 3b and multiple thyroid nodules.labs obtained showed elevated acute phase reactant with sed rate 83,procalcitonin 6.09,crp 57.8,but x-ray did not show evidence, will need MRI to confirm early osteomyelitis.    Overview/Hospital Course:  No notes on file    Interval History: Pt states he feels fine today. No complaints. Pain controlled. Pending cultures.    Review of Systems  Objective:     Vital  Signs (Most Recent):  Temp: 97.5 °F (36.4 °C) (12/02/23 0751)  Pulse: 67 (12/02/23 0751)  Resp: 19 (12/02/23 0751)  BP: (!) 145/69 (12/02/23 0751)  SpO2: 100 % (12/02/23 0812) Vital Signs (24h Range):  Temp:  [97.5 °F (36.4 °C)-98.5 °F (36.9 °C)] 97.5 °F (36.4 °C)  Pulse:  [55-70] 67  Resp:  [14-19] 19  SpO2:  [95 %-100 %] 100 %  BP: (109-150)/(56-70) 145/69     Weight: 96.6 kg (213 lb)  Body mass index is 29.71 kg/m².    Intake/Output Summary (Last 24 hours) at 12/2/2023 0913  Last data filed at 12/2/2023 0755  Gross per 24 hour   Intake 545.38 ml   Output 1490 ml   Net -944.62 ml         Physical Exam  Vitals reviewed.   Constitutional:       General: He is not in acute distress.  HENT:      Head: Normocephalic and atraumatic.      Mouth/Throat:      Mouth: Mucous membranes are moist.      Pharynx: Oropharynx is clear.   Eyes:      General: No scleral icterus.     Extraocular Movements: Extraocular movements intact.   Abdominal:      General: There is no distension.      Palpations: Abdomen is soft.      Tenderness: There is no abdominal tenderness. There is no guarding or rebound.   Musculoskeletal:         General: No swelling.      Cervical back: Neck supple. No rigidity.   Skin:     General: Skin is warm and dry.   Neurological:      General: No focal deficit present.      Mental Status: He is alert and oriented to person, place, and time.   Psychiatric:         Mood and Affect: Mood normal.         Behavior: Behavior normal.             Significant Labs: All pertinent labs within the past 24 hours have been reviewed.    Significant Imaging: I have reviewed all pertinent imaging results/findings within the past 24 hours.    Assessment/Plan:      * Osteomyelitis of foot  History of multiple decubitus ulcers, previous buttock ulcers now healed  Has bilateral heel wound and lat rt foot  CRP 57.8,Sed rate 83,with procalcitonin elevated at 6.09  Will hold off on starting antibiotics  Podiatrist consult will see him  this morning for possible bone biopsy,wound debridement and starting antibiotics  -wound cultures growing MRSA. Started on vancomycin/cefepime/flagyl per id while pending cultures.         Atherosclerosis of native artery of extremity  Vascular initially planned for angio, but on review did not think risks would out way benefits      Chronic diastolic heart failure  History of grade 3 diastolic dysfunction  Does not seem to be in acute diastolic heart failure exacerbation  Will continue twice daily furosemide, with metolazone        Multiple wounds  History of multiple decubitus ulcers, previous buttock ulcers now healed  Has bilateral heel wound and lat rt foot  CRP 57.8,Sed rate 83,with procalcitonin elevated at 6.09  Will hold off on starting antibiotics  Podiatrist consult will see him this morning for possible bone biopsy,wound debridement and starting antibiotics  -wound cultures growing MRSA. Started on vancomycin/cefepime/flagyl per id while pending cultures.    UTI (urinary tract infection)  Complained of urgency and straining at micturition  UA showed cloudy urine,with 3+ leukocyte esterase,rbc 66,wbc>100 and moderate bacteria  Currently symptoms has abaited, proteus growing in urine with ceftriaxone sensitive.  -continue abx    Paroxysmal atrial flutter  His of paroxysmal atrial fibrillation with multiple MARIA and cardiovesion  Currently on amiodarone and metoprolol 50xl  Cont apixan due to risk of thromboembolic stroke      BPH (benign prostatic hyperplasia)  Stable  Will consider tamsulosin for symptomatic treatment      Stage 3b chronic kidney disease (CKD)  Creatine stable for now. BMP reviewed- noted Estimated Creatinine Clearance: 37.1 mL/min (A) (based on SCr of 2.1 mg/dL (H)). according to latest data. Based on current GFR, CKD stage is stage 3 - GFR 30-59.    Monitor UOP and serial BMP and adjust therapy as needed. Renally dose meds.   Avoid nephrotoxic medications and procedures.    Left knee  pain  History of chronic left knee pain  Xray showed effusion  Septic arthritis vs gout vs effusion  Knee tap attempted, but minimal fluid able to be collected. Ortho following, but feel this is unlikely septic and I agree.      Alcohol abuse  History of alcohol use  Now in remission, has not had a drink in the recent past  Not at risk of withdrawal  Cont multivitamin,thiamine and folic acid      GERD (gastroesophageal reflux disease)  Stable    Anemia  Patient's anemia is currently uncontrolled. Has not received any PRBCs to date. Etiology likely d/t Iron deficiency and chronic disease due to Chronic Kidney Disease/ESRD  Current CBC reviewed-   Lab Results   Component Value Date    HGB 8.6 (L) 11/30/2023    HCT 29.0 (L) 11/30/2023   Will obtain iron panel and ferritin  Monitor serial CBC and transfuse if patient becomes hemodynamically unstable, symptomatic or H/H drops below 7/21.    Type 2 diabetes mellitus with kidney complication, with long-term current use of insulin  Patient's FSGs are controlled on current medication regimen.  Last A1c reviewed-   Lab Results   Component Value Date    HGBA1C 5.5 09/20/2023     Most recent fingerstick glucose reviewed-   Recent Labs   Lab 12/01/23  1121 12/01/23 2002 12/02/23  0753   POCTGLUCOSE 92 98 83       Current correctional scale  Low  Low hemoglobin A1C likely due to worsening kidney functions  Antihyperglycemics (From admission, onward)      Start     Stop Route Frequency Ordered    11/28/23 0900  insulin detemir U-100 (Levemir) pen 8 Units         -- SubQ 2 times daily 11/28/23 0241    11/28/23 0715  insulin aspart U-100 pen 2 Units         -- SubQ 3 times daily with meals 11/28/23 0241    11/28/23 0334  insulin aspart U-100 pen 0-5 Units         -- SubQ Before meals & nightly PRN 11/28/23 0241          Hold Oral hypoglycemics while patient is in the hospital.  Will start basal,bolus and correctional insulin as needed    Dyslipidemia  Continue  statin  Stable,lifestyle modification      Essential hypertension  Chronic, uncontrolled. Latest blood pressure and vitals reviewed-     Temp:  [97.5 °F (36.4 °C)-98.5 °F (36.9 °C)]   Pulse:  [55-70]   Resp:  [14-19]   BP: (109-150)/(56-70)   SpO2:  [95 %-100 %] .   Home meds for hypertension were reviewed and noted below.   Hypertension Medications               furosemide (LASIX) 80 MG tablet Take 1 tablet (80 mg total) by mouth 2 (two) times daily.    metOLazone (ZAROXOLYN) 5 MG tablet Take 1 tablet (5 mg total) by mouth once daily.    metoprolol succinate (TOPROL-XL) 25 MG 24 hr tablet Take 25 mg by mouth once daily.            While in the hospital, will manage blood pressure as follows; Continue home antihypertensive regimen    Will utilize p.r.n. blood pressure medication only if patient's blood pressure greater than 180/110 and he develops symptoms such as worsening chest pain or shortness of breath.      VTE Risk Mitigation (From admission, onward)           Ordered     apixaban tablet 5 mg  2 times daily         12/02/23 0916     Place sequential compression device  Until discontinued         11/28/23 0241     Reason for no Mechanical VTE Prophylaxis  Once        Question:  Reasons:  Answer:  Physician Provided (leave comment)    11/28/23 0241     IP VTE HIGH RISK PATIENT  Once         11/28/23 0241                    Discharge Planning   ELY:      Code Status: Full Code   Is the patient medically ready for discharge?:     Reason for patient still in hospital (select all that apply): Laboratory test and Treatment  Discharge Plan A: Return to nursing home                  Jose L Evans III, MD  Department of Hospital Medicine   [facility]     Acitretin Pregnancy And Lactation Text: This medication is Pregnancy Category X and should not be given to women who are pregnant or may become pregnant in the future. This medication is excreted in breast milk.

## 2023-12-02 NOTE — PLAN OF CARE
Problem: Diabetes Comorbidity  Goal: Blood Glucose Level Within Targeted Range  Outcome: Ongoing, Progressing     Problem: Adjustment to Illness (Sepsis/Septic Shock)  Goal: Optimal Coping  Outcome: Ongoing, Progressing     Problem: Infection Progression (Sepsis/Septic Shock)  Goal: Absence of Infection Signs and Symptoms  Outcome: Ongoing, Progressing     Problem: Impaired Wound Healing  Goal: Optimal Wound Healing  Outcome: Ongoing, Progressing     Problem: Infection  Goal: Absence of Infection Signs and Symptoms  Outcome: Ongoing, Progressing

## 2023-12-02 NOTE — ASSESSMENT & PLAN NOTE
Patient's FSGs are controlled on current medication regimen.  Last A1c reviewed-   Lab Results   Component Value Date    HGBA1C 5.5 09/20/2023     Most recent fingerstick glucose reviewed-   Recent Labs   Lab 12/01/23  1121 12/01/23 2002 12/02/23  0753   POCTGLUCOSE 92 98 83       Current correctional scale  Low  Low hemoglobin A1C likely due to worsening kidney functions  Antihyperglycemics (From admission, onward)    Start     Stop Route Frequency Ordered    11/28/23 0900  insulin detemir U-100 (Levemir) pen 8 Units         -- SubQ 2 times daily 11/28/23 0241    11/28/23 0715  insulin aspart U-100 pen 2 Units         -- SubQ 3 times daily with meals 11/28/23 0241 11/28/23 0334  insulin aspart U-100 pen 0-5 Units         -- SubQ Before meals & nightly PRN 11/28/23 0241        Hold Oral hypoglycemics while patient is in the hospital.  Will start basal,bolus and correctional insulin as needed

## 2023-12-02 NOTE — NURSING
Pt lying in bed sleeping, chest rise and fall equally. Dressing to bilateral feet is dry, intact, and clean. External urinary catheter present; drainage bag to gravity. AVASYS at bedside. Bed alarm set, call light in reach, instructed pt to call for assistance.     [facility]  Nurses Note -- 4 Eyes      12/1/2023       Skin assessed on: Q Shift      [] No Pressure Injuries Present    []Prevention Measures Documented    [x] Yes LDA  for Pressure Injury Previously documented     [] Yes New Pressure Injury Discovered   [] LDA for New Pressure Injury Added      Attending RN:  Anastasia Hernandez RN     Second RN:  JOYCE Albarran

## 2023-12-02 NOTE — ASSESSMENT & PLAN NOTE
Chronic, uncontrolled. Latest blood pressure and vitals reviewed-     Temp:  [97.5 °F (36.4 °C)-98.5 °F (36.9 °C)]   Pulse:  [55-70]   Resp:  [14-19]   BP: (109-150)/(56-70)   SpO2:  [95 %-100 %] .   Home meds for hypertension were reviewed and noted below.   Hypertension Medications               furosemide (LASIX) 80 MG tablet Take 1 tablet (80 mg total) by mouth 2 (two) times daily.    metOLazone (ZAROXOLYN) 5 MG tablet Take 1 tablet (5 mg total) by mouth once daily.    metoprolol succinate (TOPROL-XL) 25 MG 24 hr tablet Take 25 mg by mouth once daily.            While in the hospital, will manage blood pressure as follows; Continue home antihypertensive regimen    Will utilize p.r.n. blood pressure medication only if patient's blood pressure greater than 180/110 and he develops symptoms such as worsening chest pain or shortness of breath.

## 2023-12-02 NOTE — NURSING
[facility], [facility]  Nurses Note -- 4 Eyes      12/1/2023       Skin assessed on: Q Shift      [x] No Pressure Injuries Present    [x]Prevention Measures Documented    [] Yes LDA  for Pressure Injury Previously documented     [] Yes New Pressure Injury Discovered   [] LDA for New Pressure Injury Added      Attending RN:  Avis Hernandez RN     Second RN:  JOYCE Oconnor

## 2023-12-03 LAB
ANION GAP SERPL CALC-SCNC: 15 MMOL/L (ref 8–16)
BASOPHILS # BLD AUTO: 0.09 K/UL (ref 0–0.2)
BASOPHILS NFR BLD: 1.3 % (ref 0–1.9)
BUN SERPL-MCNC: 55 MG/DL (ref 8–23)
CALCIUM SERPL-MCNC: 9.6 MG/DL (ref 8.7–10.5)
CHLORIDE SERPL-SCNC: 91 MMOL/L (ref 95–110)
CO2 SERPL-SCNC: 30 MMOL/L (ref 23–29)
CREAT SERPL-MCNC: 2.2 MG/DL (ref 0.5–1.4)
DIFFERENTIAL METHOD: ABNORMAL
EOSINOPHIL # BLD AUTO: 0.4 K/UL (ref 0–0.5)
EOSINOPHIL NFR BLD: 5.7 % (ref 0–8)
ERYTHROCYTE [DISTWIDTH] IN BLOOD BY AUTOMATED COUNT: 16 % (ref 11.5–14.5)
EST. GFR  (NO RACE VARIABLE): 31 ML/MIN/1.73 M^2
GLUCOSE SERPL-MCNC: 81 MG/DL (ref 70–110)
HCT VFR BLD AUTO: 27.5 % (ref 40–54)
HGB BLD-MCNC: 8.1 G/DL (ref 14–18)
IMM GRANULOCYTES # BLD AUTO: 0.03 K/UL (ref 0–0.04)
IMM GRANULOCYTES NFR BLD AUTO: 0.4 % (ref 0–0.5)
LYMPHOCYTES # BLD AUTO: 1.1 K/UL (ref 1–4.8)
LYMPHOCYTES NFR BLD: 14.9 % (ref 18–48)
MAGNESIUM SERPL-MCNC: 1.9 MG/DL (ref 1.6–2.6)
MCH RBC QN AUTO: 22.6 PG (ref 27–31)
MCHC RBC AUTO-ENTMCNC: 29.5 G/DL (ref 32–36)
MCV RBC AUTO: 77 FL (ref 82–98)
MONOCYTES # BLD AUTO: 1.1 K/UL (ref 0.3–1)
MONOCYTES NFR BLD: 15 % (ref 4–15)
NEUTROPHILS # BLD AUTO: 4.5 K/UL (ref 1.8–7.7)
NEUTROPHILS NFR BLD: 62.7 % (ref 38–73)
NRBC BLD-RTO: 0 /100 WBC
PLATELET # BLD AUTO: 275 K/UL (ref 150–450)
PMV BLD AUTO: 8.6 FL (ref 9.2–12.9)
POCT GLUCOSE: 100 MG/DL (ref 70–110)
POCT GLUCOSE: 110 MG/DL (ref 70–110)
POCT GLUCOSE: 87 MG/DL (ref 70–110)
POCT GLUCOSE: 89 MG/DL (ref 70–110)
POCT GLUCOSE: 91 MG/DL (ref 70–110)
POTASSIUM SERPL-SCNC: 2.9 MMOL/L (ref 3.5–5.1)
RBC # BLD AUTO: 3.58 M/UL (ref 4.6–6.2)
SODIUM SERPL-SCNC: 136 MMOL/L (ref 136–145)
VANCOMYCIN SERPL-MCNC: 19.7 UG/ML
WBC # BLD AUTO: 7.2 K/UL (ref 3.9–12.7)

## 2023-12-03 PROCEDURE — 63600175 PHARM REV CODE 636 W HCPCS: Performed by: STUDENT IN AN ORGANIZED HEALTH CARE EDUCATION/TRAINING PROGRAM

## 2023-12-03 PROCEDURE — 99232 PR SUBSEQUENT HOSPITAL CARE,LEVL II: ICD-10-PCS | Mod: ,,, | Performed by: INTERNAL MEDICINE

## 2023-12-03 PROCEDURE — 85025 COMPLETE CBC W/AUTO DIFF WBC: CPT | Performed by: STUDENT IN AN ORGANIZED HEALTH CARE EDUCATION/TRAINING PROGRAM

## 2023-12-03 PROCEDURE — 11000001 HC ACUTE MED/SURG PRIVATE ROOM

## 2023-12-03 PROCEDURE — 99232 SBSQ HOSP IP/OBS MODERATE 35: CPT | Mod: ,,, | Performed by: INTERNAL MEDICINE

## 2023-12-03 PROCEDURE — 80202 ASSAY OF VANCOMYCIN: CPT | Performed by: STUDENT IN AN ORGANIZED HEALTH CARE EDUCATION/TRAINING PROGRAM

## 2023-12-03 PROCEDURE — 25000003 PHARM REV CODE 250: Performed by: STUDENT IN AN ORGANIZED HEALTH CARE EDUCATION/TRAINING PROGRAM

## 2023-12-03 PROCEDURE — 27000207 HC ISOLATION

## 2023-12-03 PROCEDURE — 83735 ASSAY OF MAGNESIUM: CPT | Performed by: STUDENT IN AN ORGANIZED HEALTH CARE EDUCATION/TRAINING PROGRAM

## 2023-12-03 PROCEDURE — 25000003 PHARM REV CODE 250: Performed by: SURGERY

## 2023-12-03 PROCEDURE — 36415 COLL VENOUS BLD VENIPUNCTURE: CPT | Performed by: STUDENT IN AN ORGANIZED HEALTH CARE EDUCATION/TRAINING PROGRAM

## 2023-12-03 PROCEDURE — 80048 BASIC METABOLIC PNL TOTAL CA: CPT | Performed by: STUDENT IN AN ORGANIZED HEALTH CARE EDUCATION/TRAINING PROGRAM

## 2023-12-03 RX ORDER — POTASSIUM CHLORIDE 20 MEQ/1
40 TABLET, EXTENDED RELEASE ORAL 2 TIMES DAILY
Status: DISPENSED | OUTPATIENT
Start: 2023-12-03 | End: 2023-12-04

## 2023-12-03 RX ADMIN — INSULIN DETEMIR 8 UNITS: 100 INJECTION, SOLUTION SUBCUTANEOUS at 08:12

## 2023-12-03 RX ADMIN — APIXABAN 5 MG: 5 TABLET, FILM COATED ORAL at 08:12

## 2023-12-03 RX ADMIN — POTASSIUM CHLORIDE 40 MEQ: 1500 TABLET, EXTENDED RELEASE ORAL at 08:12

## 2023-12-03 RX ADMIN — FOLIC ACID 1 MG: 1 TABLET ORAL at 08:12

## 2023-12-03 RX ADMIN — HYDROCODONE BITARTRATE AND ACETAMINOPHEN 1 TABLET: 5; 325 TABLET ORAL at 08:12

## 2023-12-03 RX ADMIN — FERROUS SULFATE TAB 325 MG (65 MG ELEMENTAL FE) 1 EACH: 325 (65 FE) TAB at 08:12

## 2023-12-03 RX ADMIN — METOLAZONE 5 MG: 5 TABLET ORAL at 08:12

## 2023-12-03 RX ADMIN — ASPIRIN 81 MG CHEWABLE TABLET 81 MG: 81 TABLET CHEWABLE at 08:12

## 2023-12-03 RX ADMIN — CEFEPIME 2 G: 2 INJECTION, POWDER, FOR SOLUTION INTRAVENOUS at 08:12

## 2023-12-03 RX ADMIN — POTASSIUM CHLORIDE 40 MEQ: 1500 TABLET, EXTENDED RELEASE ORAL at 02:12

## 2023-12-03 RX ADMIN — MUPIROCIN: 20 OINTMENT TOPICAL at 08:12

## 2023-12-03 RX ADMIN — METRONIDAZOLE 500 MG: 500 INJECTION, SOLUTION INTRAVENOUS at 04:12

## 2023-12-03 RX ADMIN — INSULIN ASPART 2 UNITS: 100 INJECTION, SOLUTION INTRAVENOUS; SUBCUTANEOUS at 08:12

## 2023-12-03 RX ADMIN — ATORVASTATIN CALCIUM 40 MG: 40 TABLET, FILM COATED ORAL at 08:12

## 2023-12-03 RX ADMIN — FUROSEMIDE 40 MG: 40 TABLET ORAL at 08:12

## 2023-12-03 RX ADMIN — METRONIDAZOLE 500 MG: 500 INJECTION, SOLUTION INTRAVENOUS at 01:12

## 2023-12-03 RX ADMIN — INSULIN ASPART 2 UNITS: 100 INJECTION, SOLUTION INTRAVENOUS; SUBCUTANEOUS at 04:12

## 2023-12-03 RX ADMIN — HYDROMORPHONE HYDROCHLORIDE 0.5 MG: 1 INJECTION, SOLUTION INTRAMUSCULAR; INTRAVENOUS; SUBCUTANEOUS at 12:12

## 2023-12-03 RX ADMIN — INSULIN ASPART 2 UNITS: 100 INJECTION, SOLUTION INTRAVENOUS; SUBCUTANEOUS at 12:12

## 2023-12-03 RX ADMIN — AMIODARONE HYDROCHLORIDE 200 MG: 200 TABLET ORAL at 08:12

## 2023-12-03 RX ADMIN — METRONIDAZOLE 500 MG: 500 INJECTION, SOLUTION INTRAVENOUS at 09:12

## 2023-12-03 RX ADMIN — TAMSULOSIN HYDROCHLORIDE 0.4 MG: 0.4 CAPSULE ORAL at 08:12

## 2023-12-03 RX ADMIN — METOPROLOL SUCCINATE 12.5 MG: 25 TABLET, EXTENDED RELEASE ORAL at 08:12

## 2023-12-03 RX ADMIN — INSULIN DETEMIR 8 UNITS: 100 INJECTION, SOLUTION SUBCUTANEOUS at 10:12

## 2023-12-03 NOTE — PLAN OF CARE
Problem: Adult Inpatient Plan of Care  Goal: Plan of Care Review  Outcome: Ongoing, Progressing  Goal: Patient-Specific Goal (Individualized)  Outcome: Ongoing, Progressing  Goal: Optimal Comfort and Wellbeing  Outcome: Ongoing, Progressing  Goal: Readiness for Transition of Care  Outcome: Ongoing, Progressing     Problem: Skin Injury Risk Increased  Goal: Skin Health and Integrity  Outcome: Ongoing, Progressing     Problem: Diabetes Comorbidity  Goal: Blood Glucose Level Within Targeted Range  Outcome: Ongoing, Progressing     Problem: Adjustment to Illness (Sepsis/Septic Shock)  Goal: Optimal Coping  Outcome: Ongoing, Progressing     Problem: Infection Progression (Sepsis/Septic Shock)  Goal: Absence of Infection Signs and Symptoms  Outcome: Ongoing, Progressing     Problem: Impaired Wound Healing  Goal: Optimal Wound Healing  Outcome: Ongoing, Progressing     Problem: Infection  Goal: Absence of Infection Signs and Symptoms  Outcome: Ongoing, Progressing

## 2023-12-03 NOTE — PROGRESS NOTES
[facility] Surg  Podiatry  Progress Note    Patient Name: Chato Bowie  MRN: [id number]  Admission Date: 11/27/2023  Hospital Length of Stay: 5 days  Attending Physician: Jose L Evans III, MD  Primary Care Provider: Irlanda Valenzuela -     Subjective:     Interval History: Patient feeling okay. Complains of knee pain. Patient states feet pain has improved.     Denies nausea, fever, vomiting, chills, shortness breath, chest pain    Follow-up For: Procedure(s) (LRB):  IRRIGATION AND DEBRIDEMENT (Bilateral)    Post-Operative Day: 1 Day Post-Op    Scheduled Meds:   amiodarone  200 mg Oral Daily    apixaban  5 mg Oral BID    aspirin  81 mg Oral Daily    atorvastatin  40 mg Oral QHS    ceFEPime (MAXIPIME) IVPB  2 g Intravenous Q12H    ferrous sulfate  1 tablet Oral BID    folic acid  1 mg Oral Daily    [START ON 12/3/2023] furosemide  40 mg Oral Daily    insulin aspart U-100  2 Units Subcutaneous TIDWM    insulin detemir U-100  8 Units Subcutaneous BID    metOLazone  5 mg Oral Daily    metoprolol succinate  12.5 mg Oral Daily    metronidazole  500 mg Intravenous Q8H    mupirocin   Nasal BID    potassium chloride  40 mEq Oral BID    tamsulosin  0.4 mg Oral Daily     Continuous Infusions:  PRN Meds:acetaminophen, acetaminophen, cadexomer iodine, dextrose 10%, dextrose 10%, glucagon (human recombinant), glucose, glucose, HYDROcodone-acetaminophen, HYDROmorphone, insulin aspart U-100, melatonin, naloxone, ondansetron, ondansetron, oxyCODONE-acetaminophen, potassium bicarbonate, potassium bicarbonate, senna-docusate 8.6-50 mg, simethicone, sodium chloride 0.9%, Pharmacy to dose Vancomycin consult **AND** vancomycin - pharmacy to dose    Review of Systems   Constitutional:  Negative for appetite change, chills, diaphoresis and fatigue.   HENT:  Negative for congestion, sore throat and tinnitus.    Eyes:  Negative for pain, discharge and itching.   Respiratory:  Negative for cough, chest tightness, shortness of breath  and wheezing.    Cardiovascular:  Positive for leg swelling. Negative for chest pain.   Gastrointestinal:  Negative for abdominal distention, abdominal pain, blood in stool, nausea and vomiting.   Endocrine: Negative for cold intolerance, heat intolerance and polydipsia.   Genitourinary:  Negative for difficulty urinating, dysuria, flank pain, frequency and hematuria.   Musculoskeletal:  Positive for joint swelling. Negative for arthralgias, back pain and myalgias.   Skin:  Positive for rash (generalized scaly rash). Negative for color change and pallor.   Neurological:  Negative for dizziness, seizures, facial asymmetry, light-headedness, numbness and headaches.   Psychiatric/Behavioral:  Negative for agitation, confusion and hallucinations.      Objective:     Vital Signs (Most Recent):  Temp: 99.2 °F (37.3 °C) (12/02/23 1621)  Pulse: 74 (12/02/23 1621)  Resp: 17 (12/02/23 1916)  BP: (!) 101/50 (12/02/23 1621)  SpO2: 95 % (12/02/23 1621) Vital Signs (24h Range):  Temp:  [97.5 °F (36.4 °C)-99.2 °F (37.3 °C)] 99.2 °F (37.3 °C)  Pulse:  [66-74] 74  Resp:  [16-19] 17  SpO2:  [95 %-100 %] 95 %  BP: (101-145)/(50-69) 101/50     Weight: 96.6 kg (213 lb)  Body mass index is 29.71 kg/m².    Foot Exam    Right Foot/Ankle     Inspection and Palpation  Skin Exam: skin changes and ulcer;     Neurovascular  Dorsalis pedis: absent  Posterior tibial: absent    Comments  Right heel wound:  Wound it extends to level of subcutaneous tissue.  Mixed fibrogranular wound.  Surrounding borders macerated.  Antibiotic beads intact.  Slight surrounding erythema.  No malodor noted.  Edema present.    Right 5th MTPJ wound: Wound it extends to level of subcutaneous tissue.  Mixed fibrogranular wound.  Surrounding borders macerated.  Antibiotic beads intact.  Slight surrounding erythema.  No malodor noted.  Edema present.    Left Foot/Ankle      Inspection and Palpation  Skin Exam: skin changes and ulcer;     Neurovascular  Dorsalis pedis:  absent  Posterior tibial: absent    Comments  Left heel wound:  Wound it extends to level of subcutaneous tissue.  Mixed fibrogranular wound.  Surrounding borders macerated.  Antibiotic beads intact.  Slight surrounding erythema.  No malodor noted.  Edema present.  Left 5th MTPJ wound: Wound it extends to level of subcutaneous tissue.  Mixed fibrogranular wound.  Surrounding borders macerated.  Antibiotic beads intact.  Slight surrounding erythema.  No malodor noted.  Edema present.                        Laboratory:  A1C:   Recent Labs   Lab 09/20/23  0446   HGBA1C 5.5     BMP:   Recent Labs   Lab 11/30/23  0446 12/01/23  0815 12/02/23  1701   GLU 78   < > 116*   *   < > 135*   K 2.8*   < > 3.0*   CL 92*   < > 90*   CO2 30*   < > 29   BUN 48*   < > 54*   CREATININE 2.1*   < > 2.2*   CALCIUM 9.4   < > 9.8   MG 1.8  --   --     < > = values in this interval not displayed.     CBC:   Recent Labs   Lab 12/02/23  1701   WBC 7.40   RBC 3.60*   HGB 8.4*   HCT 27.8*      MCV 77*   MCH 23.3*   MCHC 30.2*     CRP:   Recent Labs   Lab 11/27/23  1509   CRP 57.8*     ESR:   Recent Labs   Lab 11/27/23  1509   SEDRATE 83*     Wound Cultures:   Recent Labs   Lab 11/28/23  1347 12/01/23 1123   LABAERO Results called to and read back by:CHARITY STAFFORD 11/30/2023  10:39  METHICILLIN RESISTANT STAPHYLOCOCCUS AUREUS  Many  * No growth  No growth  No growth  No growth     Microbiology Results (last 7 days)       Procedure Component Value Units Date/Time    Aerobic culture [[id number]] Collected: 12/01/23 1123    Order Status: Completed Specimen: Bone from Foot, Left Updated: 12/02/23 1300     Aerobic Bacterial Culture No growth    Narrative:      Right 5th metatarsal    Aerobic culture [[id number]] Collected: 12/01/23 1123    Order Status: Completed Specimen: Bone from Foot, Left Updated: 12/02/23 1300     Aerobic Bacterial Culture No growth    Narrative:      Right heel    Aerobic culture [[id number]] Collected:  12/01/23 1123    Order Status: Completed Specimen: Bone from Foot, Left Updated: 12/02/23 1259     Aerobic Bacterial Culture No growth    Narrative:      Left 5th metatarsal    Aerobic culture [[id number]] Collected: 12/01/23 1123    Order Status: Completed Specimen: Bone from Foot, Left Updated: 12/02/23 1259     Aerobic Bacterial Culture No growth    Narrative:      Left heel    Culture, Anaerobe [[id number]] Collected: 11/28/23 1347    Order Status: Completed Specimen: Wound from Foot, Right Updated: 12/02/23 0855     Anaerobic Culture No anaerobes isolated    AFB Culture & Smear [[id number]] Collected: 12/01/23 1123    Order Status: Sent Specimen: Bone from Foot, Left Updated: 12/01/23 1751    AFB Culture & Smear [[id number]] Collected: 12/01/23 1123    Order Status: Sent Specimen: Bone from Foot, Right Updated: 12/01/23 1751    AFB Culture & Smear [[id number]] Collected: 12/01/23 1123    Order Status: Sent Specimen: Bone from Foot, Right Updated: 12/01/23 1751    AFB Culture & Smear [[id number]] Collected: 12/01/23 1123    Order Status: Sent Specimen: Bone from Foot, Left Updated: 12/01/23 1751    Blood culture #1 [[id number]] Collected: 11/27/23 1520    Order Status: Completed Specimen: Blood from Peripheral, Antecubital, Left Updated: 12/01/23 1703     Blood Culture, Routine No Growth after 4 days.    Narrative:      Blood Culture #1    Gram stain [[id number]] Collected: 12/01/23 1123    Order Status: Completed Specimen: Bone from Foot, Left Updated: 12/01/23 1429     Gram Stain Result Few WBC's      No organisms seen    Narrative:      Right 5th metatarsal    Gram stain [[id number]] Collected: 12/01/23 1123    Order Status: Completed Specimen: Bone from Foot, Left Updated: 12/01/23 1428     Gram Stain Result Rare WBC's      No organisms seen    Narrative:      Right heel    Gram stain [[id number]] Collected: 12/01/23 1123    Order Status: Completed Specimen: Bone from Foot, Left Updated: 12/01/23 1428      Gram Stain Result Rare WBC's      No organisms seen    Narrative:      Left 5th metatarsal    Gram stain [[id number]] Collected: 12/01/23 1123    Order Status: Completed Specimen: Bone from Foot, Left Updated: 12/01/23 1427     Gram Stain Result Rare WBC's      No organisms seen    Narrative:      Left heel    Fungus culture [[id number]] Collected: 12/01/23 1123    Order Status: No result Specimen: Bone from Foot, Left Updated: 12/01/23 1220    Culture, Anaerobe [[id number]] Collected: 12/01/23 1123    Order Status: Sent Specimen: Bone from Foot, Left Updated: 12/01/23 1216    Fungus culture [[id number]] Collected: 12/01/23 1123    Order Status: Sent Specimen: Bone from Foot, Left Updated: 12/01/23 1216    Culture, Anaerobe [[id number]] Collected: 12/01/23 1123    Order Status: Sent Specimen: Bone from Foot, Left Updated: 12/01/23 1214    Fungus culture [[id number]] Collected: 12/01/23 1123    Order Status: Sent Specimen: Bone from Foot, Left Updated: 12/01/23 1213    Culture, Anaerobe [[id number]] Collected: 12/01/23 1123    Order Status: Sent Specimen: Bone from Foot, Left Updated: 12/01/23 1210    Culture, Anaerobe [[id number]] Collected: 12/01/23 1123    Order Status: Sent Specimen: Bone from Foot, Left Updated: 12/01/23 1208    Fungus culture [[id number]] Collected: 12/01/23 1123    Order Status: Sent Specimen: Bone from Foot, Left Updated: 12/01/23 1207    Fungus culture [[id number]]     Order Status: No result Specimen: Bone from Foot, Left     Blood culture #2 [[id number]]  (Abnormal) Collected: 11/27/23 1512    Order Status: Completed Specimen: Blood from Peripheral, Antecubital, Left Updated: 12/01/23 0537     Blood Culture, Routine Gram stain aer bottle: Gram positive cocci in clusters resembling Staph      Results called to and read back by:Kathleen everett 11/28/2023      13:53      COAGULASE-NEGATIVE STAPHYLOCOCCUS SPECIES  Organism is a probable contaminant      Narrative:      Blood Culture  #2    Aerobic culture [[id number]]  (Abnormal)  (Susceptibility) Collected: 11/28/23 1347    Order Status: Completed Specimen: Wound from Foot, Right Updated: 11/30/23 1041     Aerobic Bacterial Culture Results called to and read back by:CHARITY STAFFORD 11/30/2023  10:39      METHICILLIN RESISTANT STAPHYLOCOCCUS AUREUS  Many      Urine culture [[id number]]  (Abnormal)  (Susceptibility) Collected: 11/27/23 1855    Order Status: Completed Specimen: Urine Updated: 11/29/23 0812     Urine Culture, Routine PROTEUS MIRABILIS  10,000 - 49,999 cfu/ml      Narrative:      Specimen Source->Urine    Gram stain [[id number]] Collected: 11/28/23 1347    Order Status: Completed Specimen: Wound from Foot, Right Updated: 11/28/23 1502     Gram Stain Result Few WBC's      Many Gram positive cocci in pairs            Diagnostic Results:  I have reviewed all pertinent imaging results/findings within the past 24 hours.      Assessment/Plan:     Orthopedic  Multiple wounds  Chato Bowie is a 73 y.o. male who is s/p I and D with antibiotic bead placement on 12/01/2023 by Dr. Gutiérrez. Wounds stable without purulence.     - abx per ID/primary  - dressings changed today, adaptic, gauze, kerlix, and ace wrap to all wounds. Nursing wound care orders in place.   - f/u culture results  - Patient to continue wearing heel offloading boots.        Dorian Clark, DPM  Podiatry  [facility] - Med Surg

## 2023-12-03 NOTE — NURSING
[facility]  Nurses Note -- 4 Eyes      12-2-23      Skin assessed on: Q Shift      [] No Pressure Injuries Present    []Prevention Measures Documented    [x] Yes LDA  for Pressure Injury Previously documented     [] Yes New Pressure Injury Discovered   [] LDA for New Pressure Injury Added      Attending RN:  Suze Srivastava RN     Second RN:  Samantha Keith RN

## 2023-12-03 NOTE — ASSESSMENT & PLAN NOTE
Patient's anemia is currently uncontrolled. Has not received any PRBCs to date. Etiology likely d/t Iron deficiency and chronic disease due to Chronic Kidney Disease/ESRD  Current CBC reviewed-   Lab Results   Component Value Date    HGB 8.1 (L) 12/03/2023    HCT 27.5 (L) 12/03/2023   Will obtain iron panel and ferritin  Monitor serial CBC and transfuse if patient becomes hemodynamically unstable, symptomatic or H/H drops below 7/21.

## 2023-12-03 NOTE — ASSESSMENT & PLAN NOTE
Chronic, uncontrolled. Latest blood pressure and vitals reviewed-     Temp:  [98 °F (36.7 °C)-99.2 °F (37.3 °C)]   Pulse:  [60-74]   Resp:  [16-22]   BP: (101-141)/(50-61)   SpO2:  [95 %-99 %] .   Home meds for hypertension were reviewed and noted below.   Hypertension Medications               furosemide (LASIX) 80 MG tablet Take 1 tablet (80 mg total) by mouth 2 (two) times daily.    metOLazone (ZAROXOLYN) 5 MG tablet Take 1 tablet (5 mg total) by mouth once daily.    metoprolol succinate (TOPROL-XL) 25 MG 24 hr tablet Take 25 mg by mouth once daily.            While in the hospital, will manage blood pressure as follows; Continue home antihypertensive regimen    Will utilize p.r.n. blood pressure medication only if patient's blood pressure greater than 180/110 and he develops symptoms such as worsening chest pain or shortness of breath.

## 2023-12-03 NOTE — SUBJECTIVE & OBJECTIVE
Interval History: Pt with muscular discomfort on left side asking about being moved. Pt denies cp, sob, abd pain, n/v fever chills at this time.     Review of Systems  Objective:     Vital Signs (Most Recent):  Temp: 98 °F (36.7 °C) (12/03/23 1158)  Pulse: 60 (12/03/23 1158)  Resp: 19 (12/03/23 1227)  BP: (!) 141/61 (12/03/23 1158)  SpO2: 99 % (12/03/23 1158) Vital Signs (24h Range):  Temp:  [98 °F (36.7 °C)-99.2 °F (37.3 °C)] 98 °F (36.7 °C)  Pulse:  [60-74] 60  Resp:  [16-22] 19  SpO2:  [95 %-99 %] 99 %  BP: (101-141)/(50-61) 141/61     Weight: 96.6 kg (213 lb)  Body mass index is 29.71 kg/m².    Intake/Output Summary (Last 24 hours) at 12/3/2023 1335  Last data filed at 12/3/2023 1058  Gross per 24 hour   Intake 442.87 ml   Output 1600 ml   Net -1157.13 ml         Physical Exam      Vitals reviewed.   Constitutional:       General: He is not in acute distress.  HENT:      Head: Normocephalic and atraumatic.      Mouth/Throat:      Mouth: Mucous membranes are moist.      Pharynx: Oropharynx is clear.   Eyes:      General: No scleral icterus.     Extraocular Movements: Extraocular movements intact.   Abdominal:      General: There is no distension.      Palpations: Abdomen is soft.      Tenderness: There is no abdominal tenderness. There is no guarding or rebound.   Musculoskeletal:         General: No swelling.      Cervical back: Neck supple. No rigidity.   Skin:     General: Skin is warm and dry.   Neurological:      General: No focal deficit present.      Mental Status: He is alert and oriented to person, place, and time.   Psychiatric:         Mood and Affect: Mood normal.         Behavior: Behavior normal.   Significant Labs: All pertinent labs within the past 24 hours have been reviewed.    Significant Imaging: I have reviewed all pertinent imaging results/findings within the past 24 hours.

## 2023-12-03 NOTE — PROGRESS NOTES
Pharmacokinetic Assessment Follow Up: IV Vancomycin    Vancomycin serum concentration assessment(s):    The random level was drawn correctly and can be used to guide therapy at this time. The measurement is within the desired definitive target range of 10 to 20 mcg/mL.    Vancomycin Regimen Plan:    No Vancomycin dose today - will obtain random level 12/4 at 0400    Drug levels (last 3 results):  Recent Labs   Lab Result Units 12/01/23  1457 12/02/23  0602 12/03/23  0508   Vancomycin, Random ug/mL  --  22.9 19.7   Vancomycin-Trough ug/mL 24.8*  --   --        Pharmacy will continue to follow and monitor vancomycin.    Please contact pharmacy at extension [phone] for questions regarding this assessment.    Thank you for the consult,   Ketan Hernandez       Patient brief summary:  Chato Bowie is a 73 y.o. male initiated on antimicrobial therapy with IV Vancomycin for treatment of skin & soft tissue infection    Drug Allergies:   Review of patient's allergies indicates:  No Known Allergies    Actual Body Weight:   96.6 kg    Renal Function:   Estimated Creatinine Clearance: 35.4 mL/min (A) (based on SCr of 2.2 mg/dL (H)).,     Dialysis Method (if applicable):  N/A    CBC (last 72 hours):  Recent Labs   Lab Result Units 12/02/23  1701 12/03/23  0508   WBC K/uL 7.40 7.20   Hemoglobin g/dL 8.4* 8.1*   Hematocrit % 27.8* 27.5*   Platelets K/uL 268 275   Gran % % 61.0 62.7   Lymph % % 15.4* 14.9*   Mono % % 16.2* 15.0   Eosinophil % % 6.1 5.7   Basophil % % 0.9 1.3   Differential Method  Automated Automated       Metabolic Panel (last 72 hours):  Recent Labs   Lab Result Units 12/01/23  0815 12/02/23  1701 12/03/23  0508   Sodium mmol/L 134* 135* 136   Potassium mmol/L 3.1* 3.0* 2.9*   Chloride mmol/L 90* 90* 91*   CO2 mmol/L 31* 29 30*   Glucose mg/dL 88 116* 81   BUN mg/dL 48* 54* 55*   Creatinine mg/dL 2.1* 2.2* 2.2*   Magnesium mg/dL  --   --  1.9       Vancomycin Administrations:  vancomycin given in the last 96  hours                     vancomycin 1,250 mg in dextrose 5 % (D5W) 250 mL IVPB (Vial-Mate) (mg) 1,250 mg New Bag 12/01/23 1644     1,250 mg New Bag 11/30/23 1535    vancomycin injection (g) 1 g Given 12/01/23 1043     1 g Given  1042    vancomycin (VANCOCIN) 2,000 mg in dextrose 5 % (D5W) 500 mL IVPB (mg) 2,000 mg New Bag 11/29/23 1555                    Microbiologic Results:  Microbiology Results (last 7 days)       Procedure Component Value Units Date/Time    AFB Culture & Smear [[id number]] Collected: 12/01/23 1123    Order Status: Completed Specimen: Bone from Foot, Right Updated: 12/02/23 2127     AFB Culture & Smear Culture in progress    Narrative:      Right heel    AFB Culture & Smear [[id number]] Collected: 12/01/23 1123    Order Status: Completed Specimen: Bone from Foot, Right Updated: 12/02/23 2127     AFB Culture & Smear Culture in progress    Narrative:      Right 5th metatarsal    AFB Culture & Smear [[id number]] Collected: 12/01/23 1123    Order Status: Completed Specimen: Bone from Foot, Left Updated: 12/02/23 2127     AFB Culture & Smear Culture in progress    Narrative:      Left heel    AFB Culture & Smear [[id number]] Collected: 12/01/23 1123    Order Status: Completed Specimen: Bone from Foot, Left Updated: 12/02/23 2127     AFB Culture & Smear Culture in progress    Narrative:      Left 5th metatarsal    Aerobic culture [[id number]] Collected: 12/01/23 1123    Order Status: Completed Specimen: Bone from Foot, Left Updated: 12/02/23 1300     Aerobic Bacterial Culture No growth    Narrative:      Right 5th metatarsal    Aerobic culture [[id number]] Collected: 12/01/23 1123    Order Status: Completed Specimen: Bone from Foot, Left Updated: 12/02/23 1300     Aerobic Bacterial Culture No growth    Narrative:      Right heel    Aerobic culture [[id number]] Collected: 12/01/23 1123    Order Status: Completed Specimen: Bone from Foot, Left Updated: 12/02/23 1259     Aerobic Bacterial Culture No  growth    Narrative:      Left 5th metatarsal    Aerobic culture [[id number]] Collected: 12/01/23 1123    Order Status: Completed Specimen: Bone from Foot, Left Updated: 12/02/23 1259     Aerobic Bacterial Culture No growth    Narrative:      Left heel    Culture, Anaerobe [[id number]] Collected: 11/28/23 1347    Order Status: Completed Specimen: Wound from Foot, Right Updated: 12/02/23 0855     Anaerobic Culture No anaerobes isolated    Blood culture #1 [[id number]] Collected: 11/27/23 1520    Order Status: Completed Specimen: Blood from Peripheral, Antecubital, Left Updated: 12/01/23 1703     Blood Culture, Routine No Growth after 4 days.    Narrative:      Blood Culture #1    Gram stain [[id number]] Collected: 12/01/23 1123    Order Status: Completed Specimen: Bone from Foot, Left Updated: 12/01/23 1429     Gram Stain Result Few WBC's      No organisms seen    Narrative:      Right 5th metatarsal    Gram stain [[id number]] Collected: 12/01/23 1123    Order Status: Completed Specimen: Bone from Foot, Left Updated: 12/01/23 1428     Gram Stain Result Rare WBC's      No organisms seen    Narrative:      Right heel    Gram stain [[id number]] Collected: 12/01/23 1123    Order Status: Completed Specimen: Bone from Foot, Left Updated: 12/01/23 1428     Gram Stain Result Rare WBC's      No organisms seen    Narrative:      Left 5th metatarsal    Gram stain [[id number]] Collected: 12/01/23 1123    Order Status: Completed Specimen: Bone from Foot, Left Updated: 12/01/23 1427     Gram Stain Result Rare WBC's      No organisms seen    Narrative:      Left heel    Fungus culture [[id number]] Collected: 12/01/23 1123    Order Status: No result Specimen: Bone from Foot, Left Updated: 12/01/23 1220    Culture, Anaerobe [[id number]] Collected: 12/01/23 1123    Order Status: Sent Specimen: Bone from Foot, Left Updated: 12/01/23 1216    Fungus culture [[id number]] Collected: 12/01/23 1123    Order Status: Sent Specimen: Bone from  Foot, Left Updated: 12/01/23 1216    Culture, Anaerobe [[id number]] Collected: 12/01/23 1123    Order Status: Sent Specimen: Bone from Foot, Left Updated: 12/01/23 1214    Fungus culture [[id number]] Collected: 12/01/23 1123    Order Status: Sent Specimen: Bone from Foot, Left Updated: 12/01/23 1213    Culture, Anaerobe [[id number]] Collected: 12/01/23 1123    Order Status: Sent Specimen: Bone from Foot, Left Updated: 12/01/23 1210    Culture, Anaerobe [[id number]] Collected: 12/01/23 1123    Order Status: Sent Specimen: Bone from Foot, Left Updated: 12/01/23 1208    Fungus culture [[id number]] Collected: 12/01/23 1123    Order Status: Sent Specimen: Bone from Foot, Left Updated: 12/01/23 1207    Fungus culture [[id number]]     Order Status: No result Specimen: Bone from Foot, Left     Blood culture #2 [[id number]]  (Abnormal) Collected: 11/27/23 1512    Order Status: Completed Specimen: Blood from Peripheral, Antecubital, Left Updated: 12/01/23 0537     Blood Culture, Routine Gram stain aer bottle: Gram positive cocci in clusters resembling Staph      Results called to and read back by:Kathleen everett 11/28/2023      13:53      COAGULASE-NEGATIVE STAPHYLOCOCCUS SPECIES  Organism is a probable contaminant      Narrative:      Blood Culture #2    Aerobic culture [[id number]]  (Abnormal)  (Susceptibility) Collected: 11/28/23 1347    Order Status: Completed Specimen: Wound from Foot, Right Updated: 11/30/23 1041     Aerobic Bacterial Culture Results called to and read back by:CHARITY STAFFORD 11/30/2023  10:39      METHICILLIN RESISTANT STAPHYLOCOCCUS AUREUS  Many      Urine culture [[id number]]  (Abnormal)  (Susceptibility) Collected: 11/27/23 1855    Order Status: Completed Specimen: Urine Updated: 11/29/23 0812     Urine Culture, Routine PROTEUS MIRABILIS  10,000 - 49,999 cfu/ml      Narrative:      Specimen Source->Urine    Gram stain [[id number]] Collected: 11/28/23 1347    Order Status: Completed  Specimen: Wound from Foot, Right Updated: 11/28/23 1502     Gram Stain Result Few WBC's      Many Gram positive cocci in pairs

## 2023-12-03 NOTE — ASSESSMENT & PLAN NOTE
Patient's FSGs are controlled on current medication regimen.  Last A1c reviewed-   Lab Results   Component Value Date    HGBA1C 5.5 09/20/2023     Most recent fingerstick glucose reviewed-   Recent Labs   Lab 12/02/23  1623 12/02/23  2044 12/03/23  0741 12/03/23  1158   POCTGLUCOSE 140* 111* 91 87       Current correctional scale  Low  Low hemoglobin A1C likely due to worsening kidney functions  Antihyperglycemics (From admission, onward)    Start     Stop Route Frequency Ordered    11/28/23 0900  insulin detemir U-100 (Levemir) pen 8 Units         -- SubQ 2 times daily 11/28/23 0241    11/28/23 0715  insulin aspart U-100 pen 2 Units         -- SubQ 3 times daily with meals 11/28/23 0241    11/28/23 0334  insulin aspart U-100 pen 0-5 Units         -- SubQ Before meals & nightly PRN 11/28/23 0241        Hold Oral hypoglycemics while patient is in the hospital.  Will start basal,bolus and correctional insulin as needed

## 2023-12-03 NOTE — PLAN OF CARE
Problem: Adult Inpatient Plan of Care  Goal: Plan of Care Review  Outcome: Ongoing, Progressing  Goal: Patient-Specific Goal (Individualized)  Outcome: Ongoing, Progressing  Goal: Absence of Hospital-Acquired Illness or Injury  Outcome: Ongoing, Progressing  Goal: Optimal Comfort and Wellbeing  Outcome: Ongoing, Progressing  Goal: Readiness for Transition of Care  Outcome: Ongoing, Progressing     Problem: Skin Injury Risk Increased  Goal: Skin Health and Integrity  Outcome: Ongoing, Progressing  Intervention: Optimize Skin Protection  Flowsheets (Taken 12/3/2023 1232)  Pressure Reduction Techniques: weight shift assistance provided  Pressure Reduction Devices:   feet on footrest/footstool   positioning supports utilized  Skin Protection: adhesive use limited  Head of Bed (HOB) Positioning: HOB elevated  Intervention: Promote and Optimize Oral Intake  Flowsheets (Taken 12/3/2023 1232)  Oral Nutrition Promotion: calorie-dense foods provided     Problem: Diabetes Comorbidity  Goal: Blood Glucose Level Within Targeted Range  Outcome: Ongoing, Progressing  Intervention: Monitor and Manage Glycemia  Flowsheets (Taken 12/3/2023 1232)  Glycemic Management: blood glucose monitored     Problem: Impaired Wound Healing  Goal: Optimal Wound Healing  Outcome: Ongoing, Progressing  Intervention: Promote Wound Healing  Flowsheets (Taken 12/3/2023 1232)  Oral Nutrition Promotion: calorie-dense foods provided  Sleep/Rest Enhancement:   awakenings minimized   natural light exposure provided   regular sleep/rest pattern promoted  Activity Management: Rolling - L1  Pain Management Interventions:   around-the-clock dosing utilized   pain management plan reviewed with patient/caregiver     Problem: Infection  Goal: Absence of Infection Signs and Symptoms  Outcome: Ongoing, Progressing  Intervention: Prevent or Manage Infection  Flowsheets (Taken 12/3/2023 1232)  Infection Management: aseptic technique maintained  Isolation  Precautions:   precautions maintained   contact

## 2023-12-03 NOTE — ASSESSMENT & PLAN NOTE
Chato Bowie is a 73 y.o. male who is s/p I and D with antibiotic bead placement on 12/01/2023 by Dr. Gutiérrez. Wounds stable without purulence.     - abx per ID/primary  - dressings changed today, adaptic, gauze, kerlix, and ace wrap to all wounds. Nursing wound care orders in place.   - f/u culture results  - Patient to continue wearing heel offloading boots.

## 2023-12-03 NOTE — PROGRESS NOTES
[facility] Medicine  Progress Note    Patient Name: Chato Bowie  MRN: [id number]  Patient Class: IP- Inpatient   Admission Date: 11/27/2023  Length of Stay: 6 days  Attending Physician: Jose L Evnas III, MD  Primary Care Provider: Irlanda Valenzuela -        Subjective:     Principal Problem:Osteomyelitis of foot        HPI:  73-year-old  male,current a nursing home resident,who was brought to the ED for evaluation after receiving x-ray imaging obtained 5 days earlier with concern for possible osteomyelitis,prior to obtaining the knee and foot x-ray,he had complained of left knee pain,he also has bilateral heel decubitus ulcers,wound is covered with escar,and can not be staged,although it was reported that he has osteomyelitis but the xray obtained did not show evidence of osteo per report,pain in the knee is sharp,non radiating,could be 10 out 10 in severity some time,currently only able to move around in a wheel chair,he denied fever,chills or rigor,although reports straining at micturition and urgency but denied dysuria,frequency or hematuria.He denied chest pain,shortness of breath,or orthopnea,although he has chronic bilateral lower extremities swelling,worse in the left lower extremity compared to the right,he denied previous history of lower extremity deep vein thrombosis.He denied change in bowel habit,no diarrhea or constipation.  His medical history os significant for type 2 DM,hypertension,hyperlipidemia,heart failure,coronary artery disease, hyperlipidemia,alcohol abuse,CKD 3b and multiple thyroid nodules.labs obtained showed elevated acute phase reactant with sed rate 83,procalcitonin 6.09,crp 57.8,but x-ray did not show evidence, will need MRI to confirm early osteomyelitis.    Overview/Hospital Course:  No notes on file    Interval History: Pt with muscular discomfort on left side asking about being moved. Pt denies cp, sob, abd pain, n/v fever chills at this  time.     Review of Systems  Objective:     Vital Signs (Most Recent):  Temp: 98 °F (36.7 °C) (12/03/23 1158)  Pulse: 60 (12/03/23 1158)  Resp: 19 (12/03/23 1227)  BP: (!) 141/61 (12/03/23 1158)  SpO2: 99 % (12/03/23 1158) Vital Signs (24h Range):  Temp:  [98 °F (36.7 °C)-99.2 °F (37.3 °C)] 98 °F (36.7 °C)  Pulse:  [60-74] 60  Resp:  [16-22] 19  SpO2:  [95 %-99 %] 99 %  BP: (101-141)/(50-61) 141/61     Weight: 96.6 kg (213 lb)  Body mass index is 29.71 kg/m².    Intake/Output Summary (Last 24 hours) at 12/3/2023 1335  Last data filed at 12/3/2023 1058  Gross per 24 hour   Intake 442.87 ml   Output 1600 ml   Net -1157.13 ml         Physical Exam      Vitals reviewed.   Constitutional:       General: He is not in acute distress.  HENT:      Head: Normocephalic and atraumatic.      Mouth/Throat:      Mouth: Mucous membranes are moist.      Pharynx: Oropharynx is clear.   Eyes:      General: No scleral icterus.     Extraocular Movements: Extraocular movements intact.   Abdominal:      General: There is no distension.      Palpations: Abdomen is soft.      Tenderness: There is no abdominal tenderness. There is no guarding or rebound.   Musculoskeletal:         General: No swelling.      Cervical back: Neck supple. No rigidity.   Skin:     General: Skin is warm and dry.   Neurological:      General: No focal deficit present.      Mental Status: He is alert and oriented to person, place, and time.   Psychiatric:         Mood and Affect: Mood normal.         Behavior: Behavior normal.   Significant Labs: All pertinent labs within the past 24 hours have been reviewed.    Significant Imaging: I have reviewed all pertinent imaging results/findings within the past 24 hours.    Assessment/Plan:      * Osteomyelitis of foot  History of multiple decubitus ulcers, previous buttock ulcers now healed  Has bilateral heel wound and lat rt foot  CRP 57.8,Sed rate 83,with procalcitonin elevated at 6.09  Will hold off on starting  antibiotics  Podiatrist consult will see him this morning for possible bone biopsy,wound debridement and starting antibiotics  -wound cultures growing MRSA. Started on vancomycin/cefepime/flagyl per id while pending cultures.         Atherosclerosis of native artery of extremity  Vascular initially planned for angio, but on review did not think risks would out way benefits      Chronic diastolic heart failure  History of grade 3 diastolic dysfunction  Does not seem to be in acute diastolic heart failure exacerbation  Will continue twice daily furosemide, with metolazone        Multiple wounds  History of multiple decubitus ulcers, previous buttock ulcers now healed  Has bilateral heel wound and lat rt foot  CRP 57.8,Sed rate 83,with procalcitonin elevated at 6.09  Will hold off on starting antibiotics  Podiatrist consult will see him this morning for possible bone biopsy,wound debridement and starting antibiotics  -wound cultures growing MRSA. Started on vancomycin/cefepime/flagyl per id while pending cultures.    UTI (urinary tract infection)  Complained of urgency and straining at micturition  UA showed cloudy urine,with 3+ leukocyte esterase,rbc 66,wbc>100 and moderate bacteria  Currently symptoms has abaited, proteus growing in urine with ceftriaxone sensitive.  -continue abx    Paroxysmal atrial flutter  His of paroxysmal atrial fibrillation with multiple MARIA and cardiovesion  Currently on amiodarone and metoprolol 50xl  Cont apixan due to risk of thromboembolic stroke      BPH (benign prostatic hyperplasia)  Stable  Will consider tamsulosin for symptomatic treatment      Stage 3b chronic kidney disease (CKD)  Creatine stable for now. BMP reviewed- noted Estimated Creatinine Clearance: 35.4 mL/min (A) (based on SCr of 2.2 mg/dL (H)). according to latest data. Based on current GFR, CKD stage is stage 3 - GFR 30-59.    Monitor UOP and serial BMP and adjust therapy as needed. Renally dose meds.   Avoid nephrotoxic  medications and procedures.    Left knee pain  History of chronic left knee pain  Xray showed effusion  Septic arthritis vs gout vs effusion  Knee tap attempted, but minimal fluid able to be collected. Ortho following, but feel this is unlikely septic and I agree.      Alcohol abuse  History of alcohol use  Now in remission, has not had a drink in the recent past  Not at risk of withdrawal  Cont multivitamin,thiamine and folic acid      GERD (gastroesophageal reflux disease)  Stable    Anemia  Patient's anemia is currently uncontrolled. Has not received any PRBCs to date. Etiology likely d/t Iron deficiency and chronic disease due to Chronic Kidney Disease/ESRD  Current CBC reviewed-   Lab Results   Component Value Date    HGB 8.1 (L) 12/03/2023    HCT 27.5 (L) 12/03/2023   Will obtain iron panel and ferritin  Monitor serial CBC and transfuse if patient becomes hemodynamically unstable, symptomatic or H/H drops below 7/21.    Type 2 diabetes mellitus with kidney complication, with long-term current use of insulin  Patient's FSGs are controlled on current medication regimen.  Last A1c reviewed-   Lab Results   Component Value Date    HGBA1C 5.5 09/20/2023     Most recent fingerstick glucose reviewed-   Recent Labs   Lab 12/02/23  1623 12/02/23  2044 12/03/23  0741 12/03/23  1158   POCTGLUCOSE 140* 111* 91 87       Current correctional scale  Low  Low hemoglobin A1C likely due to worsening kidney functions  Antihyperglycemics (From admission, onward)      Start     Stop Route Frequency Ordered    11/28/23 0900  insulin detemir U-100 (Levemir) pen 8 Units         -- SubQ 2 times daily 11/28/23 0241    11/28/23 0715  insulin aspart U-100 pen 2 Units         -- SubQ 3 times daily with meals 11/28/23 0241    11/28/23 0334  insulin aspart U-100 pen 0-5 Units         -- SubQ Before meals & nightly PRN 11/28/23 0241          Hold Oral hypoglycemics while patient is in the hospital.  Will start basal,bolus and correctional  insulin as needed    Dyslipidemia  Continue statin  Stable,lifestyle modification      Essential hypertension  Chronic, uncontrolled. Latest blood pressure and vitals reviewed-     Temp:  [98 °F (36.7 °C)-99.2 °F (37.3 °C)]   Pulse:  [60-74]   Resp:  [16-22]   BP: (101-141)/(50-61)   SpO2:  [95 %-99 %] .   Home meds for hypertension were reviewed and noted below.   Hypertension Medications               furosemide (LASIX) 80 MG tablet Take 1 tablet (80 mg total) by mouth 2 (two) times daily.    metOLazone (ZAROXOLYN) 5 MG tablet Take 1 tablet (5 mg total) by mouth once daily.    metoprolol succinate (TOPROL-XL) 25 MG 24 hr tablet Take 25 mg by mouth once daily.            While in the hospital, will manage blood pressure as follows; Continue home antihypertensive regimen    Will utilize p.r.n. blood pressure medication only if patient's blood pressure greater than 180/110 and he develops symptoms such as worsening chest pain or shortness of breath.      VTE Risk Mitigation (From admission, onward)           Ordered     apixaban tablet 5 mg  2 times daily         12/02/23 0916     Place sequential compression device  Until discontinued         11/28/23 0241     Reason for no Mechanical VTE Prophylaxis  Once        Question:  Reasons:  Answer:  Physician Provided (leave comment)    11/28/23 0241     IP VTE HIGH RISK PATIENT  Once         11/28/23 0241                    Discharge Planning   ELY:      Code Status: Full Code   Is the patient medically ready for discharge?:     Reason for patient still in hospital (select all that apply): Treatment and Consult recommendations  Discharge Plan A: Return to nursing home                  Jose L Evans III, MD  Department of Hospital Medicine   [facility]

## 2023-12-03 NOTE — SUBJECTIVE & OBJECTIVE
Subjective:     Interval History: Patient feeling okay. Complains of knee pain. Patient states feet pain has improved.     Denies nausea, fever, vomiting, chills, shortness breath, chest pain    Follow-up For: Procedure(s) (LRB):  IRRIGATION AND DEBRIDEMENT (Bilateral)    Post-Operative Day: 1 Day Post-Op    Scheduled Meds:   amiodarone  200 mg Oral Daily    apixaban  5 mg Oral BID    aspirin  81 mg Oral Daily    atorvastatin  40 mg Oral QHS    ceFEPime (MAXIPIME) IVPB  2 g Intravenous Q12H    ferrous sulfate  1 tablet Oral BID    folic acid  1 mg Oral Daily    [START ON 12/3/2023] furosemide  40 mg Oral Daily    insulin aspart U-100  2 Units Subcutaneous TIDWM    insulin detemir U-100  8 Units Subcutaneous BID    metOLazone  5 mg Oral Daily    metoprolol succinate  12.5 mg Oral Daily    metronidazole  500 mg Intravenous Q8H    mupirocin   Nasal BID    potassium chloride  40 mEq Oral BID    tamsulosin  0.4 mg Oral Daily     Continuous Infusions:  PRN Meds:acetaminophen, acetaminophen, cadexomer iodine, dextrose 10%, dextrose 10%, glucagon (human recombinant), glucose, glucose, HYDROcodone-acetaminophen, HYDROmorphone, insulin aspart U-100, melatonin, naloxone, ondansetron, ondansetron, oxyCODONE-acetaminophen, potassium bicarbonate, potassium bicarbonate, senna-docusate 8.6-50 mg, simethicone, sodium chloride 0.9%, Pharmacy to dose Vancomycin consult **AND** vancomycin - pharmacy to dose    Review of Systems   Constitutional:  Negative for appetite change, chills, diaphoresis and fatigue.   HENT:  Negative for congestion, sore throat and tinnitus.    Eyes:  Negative for pain, discharge and itching.   Respiratory:  Negative for cough, chest tightness, shortness of breath and wheezing.    Cardiovascular:  Positive for leg swelling. Negative for chest pain.   Gastrointestinal:  Negative for abdominal distention, abdominal pain, blood in stool, nausea and vomiting.   Endocrine: Negative for cold intolerance, heat  intolerance and polydipsia.   Genitourinary:  Negative for difficulty urinating, dysuria, flank pain, frequency and hematuria.   Musculoskeletal:  Positive for joint swelling. Negative for arthralgias, back pain and myalgias.   Skin:  Positive for rash (generalized scaly rash). Negative for color change and pallor.   Neurological:  Negative for dizziness, seizures, facial asymmetry, light-headedness, numbness and headaches.   Psychiatric/Behavioral:  Negative for agitation, confusion and hallucinations.      Objective:     Vital Signs (Most Recent):  Temp: 99.2 °F (37.3 °C) (12/02/23 1621)  Pulse: 74 (12/02/23 1621)  Resp: 17 (12/02/23 1916)  BP: (!) 101/50 (12/02/23 1621)  SpO2: 95 % (12/02/23 1621) Vital Signs (24h Range):  Temp:  [97.5 °F (36.4 °C)-99.2 °F (37.3 °C)] 99.2 °F (37.3 °C)  Pulse:  [66-74] 74  Resp:  [16-19] 17  SpO2:  [95 %-100 %] 95 %  BP: (101-145)/(50-69) 101/50     Weight: 96.6 kg (213 lb)  Body mass index is 29.71 kg/m².    Foot Exam    Right Foot/Ankle     Inspection and Palpation  Skin Exam: skin changes and ulcer;     Neurovascular  Dorsalis pedis: absent  Posterior tibial: absent    Comments  Right heel wound:  Wound it extends to level of subcutaneous tissue.  Mixed fibrogranular wound.  Surrounding borders macerated.  Antibiotic beads intact.  Slight surrounding erythema.  No malodor noted.  Edema present.    Right 5th MTPJ wound: Wound it extends to level of subcutaneous tissue.  Mixed fibrogranular wound.  Surrounding borders macerated.  Antibiotic beads intact.  Slight surrounding erythema.  No malodor noted.  Edema present.    Left Foot/Ankle      Inspection and Palpation  Skin Exam: skin changes and ulcer;     Neurovascular  Dorsalis pedis: absent  Posterior tibial: absent    Comments  Left heel wound:  Wound it extends to level of subcutaneous tissue.  Mixed fibrogranular wound.  Surrounding borders macerated.  Antibiotic beads intact.  Slight surrounding erythema.  No malodor noted.   Edema present.  Left 5th MTPJ wound: Wound it extends to level of subcutaneous tissue.  Mixed fibrogranular wound.  Surrounding borders macerated.  Antibiotic beads intact.  Slight surrounding erythema.  No malodor noted.  Edema present.                        Laboratory:  A1C:   Recent Labs   Lab 09/20/23  0446   HGBA1C 5.5     BMP:   Recent Labs   Lab 11/30/23  0446 12/01/23  0815 12/02/23  1701   GLU 78   < > 116*   *   < > 135*   K 2.8*   < > 3.0*   CL 92*   < > 90*   CO2 30*   < > 29   BUN 48*   < > 54*   CREATININE 2.1*   < > 2.2*   CALCIUM 9.4   < > 9.8   MG 1.8  --   --     < > = values in this interval not displayed.     CBC:   Recent Labs   Lab 12/02/23  1701   WBC 7.40   RBC 3.60*   HGB 8.4*   HCT 27.8*      MCV 77*   MCH 23.3*   MCHC 30.2*     CRP:   Recent Labs   Lab 11/27/23  1509   CRP 57.8*     ESR:   Recent Labs   Lab 11/27/23  1509   SEDRATE 83*     Wound Cultures:   Recent Labs   Lab 11/28/23  1347 12/01/23 1123   LABAERO Results called to and read back by:CHARITY STAFFORD 11/30/2023  10:39  METHICILLIN RESISTANT STAPHYLOCOCCUS AUREUS  Many  * No growth  No growth  No growth  No growth     Microbiology Results (last 7 days)       Procedure Component Value Units Date/Time    Aerobic culture [[id number]] Collected: 12/01/23 1123    Order Status: Completed Specimen: Bone from Foot, Left Updated: 12/02/23 1300     Aerobic Bacterial Culture No growth    Narrative:      Right 5th metatarsal    Aerobic culture [[id number]] Collected: 12/01/23 1123    Order Status: Completed Specimen: Bone from Foot, Left Updated: 12/02/23 1300     Aerobic Bacterial Culture No growth    Narrative:      Right heel    Aerobic culture [[id number]] Collected: 12/01/23 1123    Order Status: Completed Specimen: Bone from Foot, Left Updated: 12/02/23 1259     Aerobic Bacterial Culture No growth    Narrative:      Left 5th metatarsal    Aerobic culture [[id number]] Collected: 12/01/23 1123    Order Status:  Completed Specimen: Bone from Foot, Left Updated: 12/02/23 1259     Aerobic Bacterial Culture No growth    Narrative:      Left heel    Culture, Anaerobe [[id number]] Collected: 11/28/23 1347    Order Status: Completed Specimen: Wound from Foot, Right Updated: 12/02/23 0855     Anaerobic Culture No anaerobes isolated    AFB Culture & Smear [[id number]] Collected: 12/01/23 1123    Order Status: Sent Specimen: Bone from Foot, Left Updated: 12/01/23 1751    AFB Culture & Smear [[id number]] Collected: 12/01/23 1123    Order Status: Sent Specimen: Bone from Foot, Right Updated: 12/01/23 1751    AFB Culture & Smear [[id number]] Collected: 12/01/23 1123    Order Status: Sent Specimen: Bone from Foot, Right Updated: 12/01/23 1751    AFB Culture & Smear [[id number]] Collected: 12/01/23 1123    Order Status: Sent Specimen: Bone from Foot, Left Updated: 12/01/23 1751    Blood culture #1 [[id number]] Collected: 11/27/23 1520    Order Status: Completed Specimen: Blood from Peripheral, Antecubital, Left Updated: 12/01/23 1703     Blood Culture, Routine No Growth after 4 days.    Narrative:      Blood Culture #1    Gram stain [[id number]] Collected: 12/01/23 1123    Order Status: Completed Specimen: Bone from Foot, Left Updated: 12/01/23 1429     Gram Stain Result Few WBC's      No organisms seen    Narrative:      Right 5th metatarsal    Gram stain [[id number]] Collected: 12/01/23 1123    Order Status: Completed Specimen: Bone from Foot, Left Updated: 12/01/23 1428     Gram Stain Result Rare WBC's      No organisms seen    Narrative:      Right heel    Gram stain [[id number]] Collected: 12/01/23 1123    Order Status: Completed Specimen: Bone from Foot, Left Updated: 12/01/23 1428     Gram Stain Result Rare WBC's      No organisms seen    Narrative:      Left 5th metatarsal    Gram stain [[id number]] Collected: 12/01/23 1123    Order Status: Completed Specimen: Bone from Foot, Left Updated: 12/01/23 1427     Gram Stain Result Rare  WBC's      No organisms seen    Narrative:      Left heel    Fungus culture [[id number]] Collected: 12/01/23 1123    Order Status: No result Specimen: Bone from Foot, Left Updated: 12/01/23 1220    Culture, Anaerobe [[id number]] Collected: 12/01/23 1123    Order Status: Sent Specimen: Bone from Foot, Left Updated: 12/01/23 1216    Fungus culture [[id number]] Collected: 12/01/23 1123    Order Status: Sent Specimen: Bone from Foot, Left Updated: 12/01/23 1216    Culture, Anaerobe [[id number]] Collected: 12/01/23 1123    Order Status: Sent Specimen: Bone from Foot, Left Updated: 12/01/23 1214    Fungus culture [[id number]] Collected: 12/01/23 1123    Order Status: Sent Specimen: Bone from Foot, Left Updated: 12/01/23 1213    Culture, Anaerobe [[id number]] Collected: 12/01/23 1123    Order Status: Sent Specimen: Bone from Foot, Left Updated: 12/01/23 1210    Culture, Anaerobe [[id number]] Collected: 12/01/23 1123    Order Status: Sent Specimen: Bone from Foot, Left Updated: 12/01/23 1208    Fungus culture [[id number]] Collected: 12/01/23 1123    Order Status: Sent Specimen: Bone from Foot, Left Updated: 12/01/23 1207    Fungus culture [[id number]]     Order Status: No result Specimen: Bone from Foot, Left     Blood culture #2 [[id number]]  (Abnormal) Collected: 11/27/23 1512    Order Status: Completed Specimen: Blood from Peripheral, Antecubital, Left Updated: 12/01/23 0537     Blood Culture, Routine Gram stain aer bottle: Gram positive cocci in clusters resembling Staph      Results called to and read back by:Kathleen everett 11/28/2023      13:53      COAGULASE-NEGATIVE STAPHYLOCOCCUS SPECIES  Organism is a probable contaminant      Narrative:      Blood Culture #2    Aerobic culture [[id number]]  (Abnormal)  (Susceptibility) Collected: 11/28/23 1347    Order Status: Completed Specimen: Wound from Foot, Right Updated: 11/30/23 1041     Aerobic Bacterial Culture Results called to and read back by:CHARITY CHAVEZ  RIVERA 11/30/2023  10:39      METHICILLIN RESISTANT STAPHYLOCOCCUS AUREUS  Many      Urine culture [[id number]]  (Abnormal)  (Susceptibility) Collected: 11/27/23 1855    Order Status: Completed Specimen: Urine Updated: 11/29/23 0812     Urine Culture, Routine PROTEUS MIRABILIS  10,000 - 49,999 cfu/ml      Narrative:      Specimen Source->Urine    Gram stain [[id number]] Collected: 11/28/23 1347    Order Status: Completed Specimen: Wound from Foot, Right Updated: 11/28/23 1502     Gram Stain Result Few WBC's      Many Gram positive cocci in pairs            Diagnostic Results:  I have reviewed all pertinent imaging results/findings within the past 24 hours.

## 2023-12-04 LAB
ANION GAP SERPL CALC-SCNC: 17 MMOL/L (ref 8–16)
BASOPHILS # BLD AUTO: 0.09 K/UL (ref 0–0.2)
BASOPHILS NFR BLD: 1.2 % (ref 0–1.9)
BUN SERPL-MCNC: 55 MG/DL (ref 8–23)
CALCIUM SERPL-MCNC: 9.6 MG/DL (ref 8.7–10.5)
CHLORIDE SERPL-SCNC: 91 MMOL/L (ref 95–110)
CO2 SERPL-SCNC: 28 MMOL/L (ref 23–29)
CREAT SERPL-MCNC: 2.3 MG/DL (ref 0.5–1.4)
DIFFERENTIAL METHOD: ABNORMAL
EOSINOPHIL # BLD AUTO: 0.4 K/UL (ref 0–0.5)
EOSINOPHIL NFR BLD: 5.5 % (ref 0–8)
ERYTHROCYTE [DISTWIDTH] IN BLOOD BY AUTOMATED COUNT: 16.1 % (ref 11.5–14.5)
EST. GFR  (NO RACE VARIABLE): 29 ML/MIN/1.73 M^2
GLUCOSE SERPL-MCNC: 87 MG/DL (ref 70–110)
HCT VFR BLD AUTO: 27.1 % (ref 40–54)
HGB BLD-MCNC: 8 G/DL (ref 14–18)
IMM GRANULOCYTES # BLD AUTO: 0.03 K/UL (ref 0–0.04)
IMM GRANULOCYTES NFR BLD AUTO: 0.4 % (ref 0–0.5)
LYMPHOCYTES # BLD AUTO: 1.1 K/UL (ref 1–4.8)
LYMPHOCYTES NFR BLD: 14.5 % (ref 18–48)
MAGNESIUM SERPL-MCNC: 2 MG/DL (ref 1.6–2.6)
MCH RBC QN AUTO: 22.8 PG (ref 27–31)
MCHC RBC AUTO-ENTMCNC: 29.5 G/DL (ref 32–36)
MCV RBC AUTO: 77 FL (ref 82–98)
MONOCYTES # BLD AUTO: 1.1 K/UL (ref 0.3–1)
MONOCYTES NFR BLD: 15.7 % (ref 4–15)
NEUTROPHILS # BLD AUTO: 4.5 K/UL (ref 1.8–7.7)
NEUTROPHILS NFR BLD: 62.7 % (ref 38–73)
NRBC BLD-RTO: 0 /100 WBC
PLATELET # BLD AUTO: 265 K/UL (ref 150–450)
PMV BLD AUTO: 8.7 FL (ref 9.2–12.9)
POCT GLUCOSE: 121 MG/DL (ref 70–110)
POCT GLUCOSE: 137 MG/DL (ref 70–110)
POCT GLUCOSE: 144 MG/DL (ref 70–110)
POCT GLUCOSE: 85 MG/DL (ref 70–110)
POTASSIUM SERPL-SCNC: 3.2 MMOL/L (ref 3.5–5.1)
RBC # BLD AUTO: 3.51 M/UL (ref 4.6–6.2)
SODIUM SERPL-SCNC: 136 MMOL/L (ref 136–145)
VANCOMYCIN SERPL-MCNC: 16.6 UG/ML
WBC # BLD AUTO: 7.22 K/UL (ref 3.9–12.7)

## 2023-12-04 PROCEDURE — 25000003 PHARM REV CODE 250: Performed by: HOSPITALIST

## 2023-12-04 PROCEDURE — 36415 COLL VENOUS BLD VENIPUNCTURE: CPT | Performed by: STUDENT IN AN ORGANIZED HEALTH CARE EDUCATION/TRAINING PROGRAM

## 2023-12-04 PROCEDURE — 25000003 PHARM REV CODE 250: Performed by: STUDENT IN AN ORGANIZED HEALTH CARE EDUCATION/TRAINING PROGRAM

## 2023-12-04 PROCEDURE — 27000207 HC ISOLATION

## 2023-12-04 PROCEDURE — 99223 1ST HOSP IP/OBS HIGH 75: CPT | Mod: ,,, | Performed by: INTERNAL MEDICINE

## 2023-12-04 PROCEDURE — 63600175 PHARM REV CODE 636 W HCPCS: Performed by: HOSPITALIST

## 2023-12-04 PROCEDURE — 25000003 PHARM REV CODE 250: Performed by: SURGERY

## 2023-12-04 PROCEDURE — 99232 PR SUBSEQUENT HOSPITAL CARE,LEVL II: ICD-10-PCS | Mod: ,,, | Performed by: PODIATRIST

## 2023-12-04 PROCEDURE — 99223 PR INITIAL HOSPITAL CARE,LEVL III: ICD-10-PCS | Mod: ,,, | Performed by: INTERNAL MEDICINE

## 2023-12-04 PROCEDURE — 11000001 HC ACUTE MED/SURG PRIVATE ROOM

## 2023-12-04 PROCEDURE — 85025 COMPLETE CBC W/AUTO DIFF WBC: CPT | Performed by: STUDENT IN AN ORGANIZED HEALTH CARE EDUCATION/TRAINING PROGRAM

## 2023-12-04 PROCEDURE — 99233 PR SUBSEQUENT HOSPITAL CARE,LEVL III: ICD-10-PCS | Mod: ,,, | Performed by: SURGERY

## 2023-12-04 PROCEDURE — 80048 BASIC METABOLIC PNL TOTAL CA: CPT | Performed by: STUDENT IN AN ORGANIZED HEALTH CARE EDUCATION/TRAINING PROGRAM

## 2023-12-04 PROCEDURE — 99233 SBSQ HOSP IP/OBS HIGH 50: CPT | Mod: ,,, | Performed by: SURGERY

## 2023-12-04 PROCEDURE — 99232 SBSQ HOSP IP/OBS MODERATE 35: CPT | Mod: ,,, | Performed by: PODIATRIST

## 2023-12-04 PROCEDURE — 63600175 PHARM REV CODE 636 W HCPCS: Performed by: STUDENT IN AN ORGANIZED HEALTH CARE EDUCATION/TRAINING PROGRAM

## 2023-12-04 PROCEDURE — 80202 ASSAY OF VANCOMYCIN: CPT | Performed by: STUDENT IN AN ORGANIZED HEALTH CARE EDUCATION/TRAINING PROGRAM

## 2023-12-04 PROCEDURE — 83735 ASSAY OF MAGNESIUM: CPT | Performed by: STUDENT IN AN ORGANIZED HEALTH CARE EDUCATION/TRAINING PROGRAM

## 2023-12-04 RX ORDER — POTASSIUM CHLORIDE 20 MEQ/1
40 TABLET, EXTENDED RELEASE ORAL 3 TIMES DAILY
Status: COMPLETED | OUTPATIENT
Start: 2023-12-04 | End: 2023-12-04

## 2023-12-04 RX ADMIN — TAMSULOSIN HYDROCHLORIDE 0.4 MG: 0.4 CAPSULE ORAL at 08:12

## 2023-12-04 RX ADMIN — CEFEPIME 2 G: 2 INJECTION, POWDER, FOR SOLUTION INTRAVENOUS at 07:12

## 2023-12-04 RX ADMIN — FERROUS SULFATE TAB 325 MG (65 MG ELEMENTAL FE) 1 EACH: 325 (65 FE) TAB at 08:12

## 2023-12-04 RX ADMIN — INSULIN DETEMIR 8 UNITS: 100 INJECTION, SOLUTION SUBCUTANEOUS at 08:12

## 2023-12-04 RX ADMIN — METRONIDAZOLE 500 MG: 500 INJECTION, SOLUTION INTRAVENOUS at 04:12

## 2023-12-04 RX ADMIN — MUPIROCIN: 20 OINTMENT TOPICAL at 08:12

## 2023-12-04 RX ADMIN — METRONIDAZOLE 500 MG: 500 INJECTION, SOLUTION INTRAVENOUS at 12:12

## 2023-12-04 RX ADMIN — FUROSEMIDE 40 MG: 40 TABLET ORAL at 08:12

## 2023-12-04 RX ADMIN — POTASSIUM CHLORIDE 40 MEQ: 1500 TABLET, EXTENDED RELEASE ORAL at 10:12

## 2023-12-04 RX ADMIN — VANCOMYCIN HYDROCHLORIDE 500 MG: 500 INJECTION, POWDER, LYOPHILIZED, FOR SOLUTION INTRAVENOUS at 09:12

## 2023-12-04 RX ADMIN — METOLAZONE 5 MG: 5 TABLET ORAL at 08:12

## 2023-12-04 RX ADMIN — INSULIN ASPART 2 UNITS: 100 INJECTION, SOLUTION INTRAVENOUS; SUBCUTANEOUS at 07:12

## 2023-12-04 RX ADMIN — AMIODARONE HYDROCHLORIDE 200 MG: 200 TABLET ORAL at 08:12

## 2023-12-04 RX ADMIN — INSULIN ASPART 2 UNITS: 100 INJECTION, SOLUTION INTRAVENOUS; SUBCUTANEOUS at 04:12

## 2023-12-04 RX ADMIN — ATORVASTATIN CALCIUM 40 MG: 40 TABLET, FILM COATED ORAL at 08:12

## 2023-12-04 RX ADMIN — INSULIN ASPART 2 UNITS: 100 INJECTION, SOLUTION INTRAVENOUS; SUBCUTANEOUS at 11:12

## 2023-12-04 RX ADMIN — POTASSIUM CHLORIDE 40 MEQ: 1500 TABLET, EXTENDED RELEASE ORAL at 08:12

## 2023-12-04 RX ADMIN — FOLIC ACID 1 MG: 1 TABLET ORAL at 08:12

## 2023-12-04 RX ADMIN — ASPIRIN 81 MG CHEWABLE TABLET 81 MG: 81 TABLET CHEWABLE at 08:12

## 2023-12-04 RX ADMIN — POTASSIUM CHLORIDE 40 MEQ: 1500 TABLET, EXTENDED RELEASE ORAL at 02:12

## 2023-12-04 RX ADMIN — METRONIDAZOLE 500 MG: 500 INJECTION, SOLUTION INTRAVENOUS at 07:12

## 2023-12-04 RX ADMIN — APIXABAN 5 MG: 5 TABLET, FILM COATED ORAL at 08:12

## 2023-12-04 NOTE — CONSULTS
[facility] Hospital Medicine  Telemedicine Consult Note       Chato Bowie has been accepted for transfer to [facility] and will be followed through telemedicine services beginning  at 7 AM.      Nhung Martínez MD  [facility] Staff

## 2023-12-04 NOTE — ASSESSMENT & PLAN NOTE
Creatine stable for now. BMP reviewed- noted Estimated Creatinine Clearance: 30.5 mL/min (A) (based on SCr of 2.3 mg/dL (H)). according to latest data. Based on current GFR, CKD stage is stage 3 - GFR 30-59.    Monitor UOP and serial BMP and adjust therapy as needed. Renally dose meds.   Avoid nephrotoxic medications and procedures.

## 2023-12-04 NOTE — ASSESSMENT & PLAN NOTE
History of chronic left knee pain  Xray showed effusion  Septic arthritis vs gout vs effusion  Knee tap attempted, but minimal fluid able to be collected. Ortho following, but feel this is unlikely septic  ID following.  Continue antibiotics as stated above

## 2023-12-04 NOTE — NURSING
[facility] [place]  Nurses Note -- 4 Eyes      12/4/2023       Skin assessed on: Q Shift      [] No Pressure Injuries Present    []Prevention Measures Documented    [x] Yes LDA  for Pressure Injury Previously documented     [] Yes New Pressure Injury Discovered   [] LDA for New Pressure Injury Added      Attending RN:  Mickie Villalobos RN     Second RN:  Suze Srivastava RN

## 2023-12-04 NOTE — PROGRESS NOTES
[facility] Medicine  Telemedicine Progress Note    Patient Name: Chato Bowie  MRN: [id number]  Patient Class: IP- Inpatient   Admission Date: 11/27/2023  Length of Stay: 7 days  Attending Physician: Nhung Martínez MD  Primary Care Provider: Irlanda Valenzuela -          Subjective:     Principal Problem:Osteomyelitis of foot        HPI:  73-year-old  male,current a nursing home resident,who was brought to the ED for evaluation after receiving x-ray imaging obtained 5 days earlier with concern for possible osteomyelitis,prior to obtaining the knee and foot x-ray,he had complained of left knee pain,he also has bilateral heel decubitus ulcers,wound is covered with escar,and can not be staged,although it was reported that he has osteomyelitis but the xray obtained did not show evidence of osteo per report,pain in the knee is sharp,non radiating,could be 10 out 10 in severity some time,currently only able to move around in a wheel chair,he denied fever,chills or rigor,although reports straining at micturition and urgency but denied dysuria,frequency or hematuria.He denied chest pain,shortness of breath,or orthopnea,although he has chronic bilateral lower extremities swelling,worse in the left lower extremity compared to the right,he denied previous history of lower extremity deep vein thrombosis.He denied change in bowel habit,no diarrhea or constipation.  His medical history os significant for type 2 DM,hypertension,hyperlipidemia,heart failure,coronary artery disease, hyperlipidemia,alcohol abuse,CKD 3b and multiple thyroid nodules.labs obtained showed elevated acute phase reactant with sed rate 83,procalcitonin 6.09,crp 57.8,but x-ray did not show evidence, will need MRI to confirm early osteomyelitis.    Overview/Hospital Course:  No notes on file    Interval History: Pt noted to be afebrile and hemodynamically stable. Stable respiratory status. No acute events overnight.  No new complaints this morning.     Review of Systems   Constitutional:  Positive for activity change and fatigue. Negative for fever.   Respiratory:  Negative for cough and shortness of breath.    Cardiovascular:  Negative for chest pain.   Gastrointestinal:  Negative for abdominal pain.   Musculoskeletal:  Positive for arthralgias.   Neurological:  Negative for dizziness and headaches.   Psychiatric/Behavioral:  Negative for confusion.    All other systems reviewed and are negative.    Objective:     Vital Signs (Most Recent):  Temp: 98.3 °F (36.8 °C) (12/04/23 1632)  Pulse: 65 (12/04/23 1632)  Resp: 20 (12/04/23 1632)  BP: 139/64 (12/04/23 1632)  SpO2: 98 % (12/04/23 1632) Vital Signs (24h Range):  Temp:  [98.3 °F (36.8 °C)-98.6 °F (37 °C)] 98.3 °F (36.8 °C)  Pulse:  [55-67] 65  Resp:  [16-20] 20  SpO2:  [96 %-99 %] 98 %  BP: (107-140)/(53-64) 139/64     Weight: 82.7 kg (182 lb 5.1 oz)  Body mass index is 25.43 kg/m².    Intake/Output Summary (Last 24 hours) at 12/4/2023 1705  Last data filed at 12/4/2023 0808  Gross per 24 hour   Intake 783.88 ml   Output 400 ml   Net 383.88 ml         Physical Exam  Vitals and nursing note reviewed.   Constitutional:       Appearance: Normal appearance.   HENT:      Head: Normocephalic and atraumatic.   Eyes:      Extraocular Movements: Extraocular movements intact.      Pupils: Pupils are equal, round, and reactive to light.   Cardiovascular:      Rate and Rhythm: Normal rate and regular rhythm.   Pulmonary:      Effort: Pulmonary effort is normal. No respiratory distress.   Abdominal:      General: Abdomen is flat. There is no distension.   Musculoskeletal:         General: Normal range of motion.      Cervical back: Normal range of motion.      Comments: Foot wound   Neurological:      General: No focal deficit present.      Mental Status: He is alert and oriented to person, place, and time.   Psychiatric:         Mood and Affect: Mood normal.         Behavior: Behavior  normal.             Significant Labs: All pertinent labs within the past 24 hours have been reviewed.  CBC:   Recent Labs   Lab 12/03/23  0508 12/04/23  0446   WBC 7.20 7.22   HGB 8.1* 8.0*   HCT 27.5* 27.1*    265     CMP:   Recent Labs   Lab 12/03/23  0508 12/04/23  0446    136   K 2.9* 3.2*   CL 91* 91*   CO2 30* 28   GLU 81 87   BUN 55* 55*   CREATININE 2.2* 2.3*   CALCIUM 9.6 9.6   ANIONGAP 15 17*       Significant Imaging: I have reviewed all pertinent imaging results/findings within the past 24 hours.    Assessment/Plan:      * Osteomyelitis of foot  History of multiple decubitus ulcers, previous buttock ulcers now healed  Has bilateral heel wound and lat rt foot  CRP 57.8,Sed rate 83,with procalcitonin elevated at 6.09  -ID, podiatry consulted.  Patient s/p I&D.  Continue to follow-up cultures and continue antibiotics per Infectious Disease recommendations.           Atherosclerosis of native artery of extremity  Vascular initially planned for angio, but on review did not think risks would out way benefits      Chronic diastolic heart failure  History of grade 3 diastolic dysfunction  Does not seem to be in acute diastolic heart failure exacerbation  Will continue twice daily furosemide, with metolazone        Multiple wounds  History of multiple decubitus ulcers, previous buttock ulcers now healed  Has bilateral heel wound and lat rt foot  CRP 57.8,Sed rate 83,with procalcitonin elevated at 6.09  Podiatry and ID consulted  -S/p OR debridement/ bone biopsy B/L feet.Left heel and lateral foot with necrotic base. Abx beads in place to all wounds. DSD applied.   -wound cultures growing MRSA. Started on vancomycin/cefepime/flagyl per id while pending cultures.   -Vascular surgery consulted    UTI (urinary tract infection)  Complained of urgency and straining at micturition  UA showed cloudy urine,with 3+ leukocyte esterase,rbc 66,wbc>100 and moderate bacteria  Currently symptoms has abaited, proteus  growing in urine with ceftriaxone sensitive.  -continue abx    Paroxysmal atrial flutter  - His of paroxysmal atrial fibrillation with multiple MARIA and cardiovesion  - Currently on amiodarone and metoprolol 50xl  - Cont apixan due to risk of thromboembolic stroke  - continue to monitor on telemetry   - Maintain K > 4, Mg > 2        BPH (benign prostatic hyperplasia)  Stable  Will consider tamsulosin for symptomatic treatment      Stage 3b chronic kidney disease (CKD)  Creatine stable for now. BMP reviewed- noted Estimated Creatinine Clearance: 30.5 mL/min (A) (based on SCr of 2.3 mg/dL (H)). according to latest data. Based on current GFR, CKD stage is stage 3 - GFR 30-59.    Monitor UOP and serial BMP and adjust therapy as needed. Renally dose meds.   Avoid nephrotoxic medications and procedures.    Left knee pain  History of chronic left knee pain  Xray showed effusion  Septic arthritis vs gout vs effusion  Knee tap attempted, but minimal fluid able to be collected. Ortho following, but feel this is unlikely septic  ID following.  Continue antibiotics as stated above      Alcohol abuse  History of alcohol use  Now in remission, has not had a drink in the recent past  Not at risk of withdrawal  Cont multivitamin,thiamine and folic acid      Hypokalemia  -labs reviewed  -replete and monitor    GERD (gastroesophageal reflux disease)  - Stable    Anemia  Patient's anemia is currently uncontrolled. Has not received any PRBCs to date. Etiology likely d/t Iron deficiency and chronic disease due to Chronic Kidney Disease/ESRD  Current CBC reviewed-   Lab Results   Component Value Date    HGB 8.0 (L) 12/04/2023    HCT 27.1 (L) 12/04/2023   Will obtain iron panel and ferritin  Monitor serial CBC and transfuse if patient becomes hemodynamically unstable, symptomatic or H/H drops below 7/21.    Type 2 diabetes mellitus with kidney complication, with long-term current use of insulin  Patient's FSGs are controlled on current  medication regimen.  Last A1c reviewed-   Lab Results   Component Value Date    HGBA1C 5.5 09/20/2023     Most recent fingerstick glucose reviewed-   Recent Labs   Lab 12/03/23  2147 12/04/23  0743 12/04/23  1056 12/04/23  1634   POCTGLUCOSE 110 85 144* 137*       Current correctional scale  Low  Low hemoglobin A1C likely due to worsening kidney functions  Antihyperglycemics (From admission, onward)      Start     Stop Route Frequency Ordered    11/28/23 0900  insulin detemir U-100 (Levemir) pen 8 Units         -- SubQ 2 times daily 11/28/23 0241    11/28/23 0715  insulin aspart U-100 pen 2 Units         -- SubQ 3 times daily with meals 11/28/23 0241 11/28/23 0334  insulin aspart U-100 pen 0-5 Units         -- SubQ Before meals & nightly PRN 11/28/23 0241          Hold Oral hypoglycemics while patient is in the hospital.  Will start basal,bolus and correctional insulin as needed    Dyslipidemia  -Continue statin  -Stable,lifestyle modification      Essential hypertension  Chronic, uncontrolled. Latest blood pressure and vitals reviewed-     Temp:  [98.3 °F (36.8 °C)-98.6 °F (37 °C)]   Pulse:  [55-67]   Resp:  [16-20]   BP: (107-140)/(53-64)   SpO2:  [96 %-99 %] .   Home meds for hypertension were reviewed and noted below.   Hypertension Medications               furosemide (LASIX) 80 MG tablet Take 1 tablet (80 mg total) by mouth 2 (two) times daily.    metOLazone (ZAROXOLYN) 5 MG tablet Take 1 tablet (5 mg total) by mouth once daily.    metoprolol succinate (TOPROL-XL) 25 MG 24 hr tablet Take 25 mg by mouth once daily.            While in the hospital, will manage blood pressure as follows; Continue home antihypertensive regimen    Will utilize p.r.n. blood pressure medication only if patient's blood pressure greater than 180/110 and he develops symptoms such as worsening chest pain or shortness of breath.      VTE Risk Mitigation (From admission, onward)           Ordered     apixaban tablet 5 mg  2 times daily          12/02/23 0916     Place sequential compression device  Until discontinued         11/28/23 0241     Reason for no Mechanical VTE Prophylaxis  Once        Question:  Reasons:  Answer:  Physician Provided (leave comment)    11/28/23 0241     IP VTE HIGH RISK PATIENT  Once         11/28/23 0241                          I have completed this tele-visit without the assistance of a telepresenter.    The attending portion of this evaluation, treatment, and documentation was performed per Nhung Martínez MD via Telemedicine AudioVisual using the secure [employer] software platform with 2 way audio/video. The provider was located off-site and the patient is located in the hospital. The aforementioned video software was utilized to document the relevant history and physical exam    Nhung Martínez MD  Department of Hospital Medicine   [facility] Surg

## 2023-12-04 NOTE — PROGRESS NOTES
[facility] Surg  Vascular Surgery  Progress Note    Patient Name: Chato Bowie  MRN: [id number]  Admission Date: 11/27/2023  Primary Care Provider: Irlanda Valenzuela -    Subjective:     Interval History:  s/p OR BLE hheel I&D w podiatry, awaiting cultures to finish .  Clinical concerns for poor perfusion to L heel per Podiatry    Post-Op Info:  Procedure(s) (LRB):  IRRIGATION AND DEBRIDEMENT (Bilateral)   3 Days Post-Op      Medications:  Continuous Infusions:  Scheduled Meds:   amiodarone  200 mg Oral Daily    apixaban  5 mg Oral BID    aspirin  81 mg Oral Daily    atorvastatin  40 mg Oral QHS    ceFEPime (MAXIPIME) IVPB  2 g Intravenous Q12H    ferrous sulfate  1 tablet Oral BID    folic acid  1 mg Oral Daily    furosemide  40 mg Oral Daily    insulin aspart U-100  2 Units Subcutaneous TIDWM    insulin detemir U-100  8 Units Subcutaneous BID    metOLazone  5 mg Oral Daily    metoprolol succinate  12.5 mg Oral Daily    metronidazole  500 mg Intravenous Q8H    mupirocin   Nasal BID    potassium chloride  40 mEq Oral BID    tamsulosin  0.4 mg Oral Daily     PRN Meds:acetaminophen, acetaminophen, cadexomer iodine, dextrose 10%, dextrose 10%, glucagon (human recombinant), glucose, glucose, HYDROcodone-acetaminophen, HYDROmorphone, insulin aspart U-100, melatonin, naloxone, ondansetron, ondansetron, oxyCODONE-acetaminophen, potassium bicarbonate, potassium bicarbonate, senna-docusate 8.6-50 mg, simethicone, sodium chloride 0.9%, Pharmacy to dose Vancomycin consult **AND** vancomycin - pharmacy to dose     Objective:     Vital Signs (Most Recent):  Temp: 98.3 °F (36.8 °C) (12/04/23 1632)  Pulse: 65 (12/04/23 1632)  Resp: 20 (12/04/23 1632)  BP: 139/64 (12/04/23 1632)  SpO2: 98 % (12/04/23 1632) Vital Signs (24h Range):  Temp:  [98.3 °F (36.8 °C)-98.6 °F (37 °C)] 98.3 °F (36.8 °C)  Pulse:  [55-67] 65  Resp:  [16-20] 20  SpO2:  [96 %-99 %] 98 %  BP: (107-140)/(53-64) 139/64     Date 12/04/23 0700 - 12/05/23 0659  "  Shift 7546-4893 4785-8093 4009-3837 24 Hour Total   INTAKE   P.O. 240   240   Shift Total(mL/kg) 240(2.9)   240(2.9)   OUTPUT   Shift Total(mL/kg)       Weight (kg) 82.7 82.7 82.7 82.7         Physical Exam  Bilateral DP signals, biphasic on right monophasic on the left  Significant Labs:  eGFR: No results for input(s): "EGFRIFAFRICA", "EGFRCYSTC", "LABGLOM" in the last 48 hours.    Invalid input(s): "EGFRNON-AA"  Hemoglobin: No results for input(s): "HGBA1C" in the last 48 hours.  Lactic Acid: No results for input(s): "LACTATE" in the last 48 hours.  LFTs: No results for input(s): "ALT", "AST", "ALKPHOS", "BILITOT", "PROT", "ALBUMIN" in the last 48 hours.    Significant Diagnostics:  I have reviewed all pertinent imaging results/findings within the past 24 hours.    Assessment/Plan:     Active Diagnoses:    Diagnosis Date Noted POA    PRINCIPAL PROBLEM:  Osteomyelitis of foot [M86.9] 11/30/2023 Yes    Chronic diastolic heart failure [I50.32] 11/28/2023 Yes    Atherosclerosis of native artery of extremity [I70.209] 11/28/2023 Yes    Multiple wounds [T07.XXXA] 07/28/2023 Yes     Chronic    UTI (urinary tract infection) [N39.0] 09/05/2021 Yes    Paroxysmal atrial flutter [I48.92] 12/07/2020 Yes     Chronic    BPH (benign prostatic hyperplasia) [N40.0] 11/24/2020 Yes    Stage 3b chronic kidney disease (CKD) [N18.32] 11/24/2020 Yes    Left knee pain [M25.562] 03/23/2020 Yes    Alcohol abuse [F10.10] 02/14/2019 Yes    Type 2 diabetes mellitus with kidney complication, with long-term current use of insulin [E11.29, Z79.4] 03/10/2017 Not Applicable     Chronic    GERD (gastroesophageal reflux disease) [K21.9] 03/10/2017 Yes     Chronic    Essential hypertension [I10] 03/10/2017 Yes    Anemia [D64.9] 03/10/2017 Yes    Dyslipidemia [E78.5] 03/10/2017 Yes      Problems Resolved During this Admission:    Diagnosis Date Noted Date Resolved POA    DJD (degenerative joint disease) [M19.90] 06/14/2023 11/30/2023 Yes "     73-year-old man with multiple comorbidities occluding CHF CKD3 and chronic limb-threatening ischemia with bilateral foot wounds nonhealing of BLE heels s/p  BLE heel I&D w podiatry, awaiting culture results .  Given the ongoing Clinical concerns for poor perfusion to L heel.  Risks and benefits of angiogram with intervention was reconsidered and revisited w patient.  Role for BKA also discussed with patient who adamately wanted to do whatever possible to have a chance a limb salvage.    Plan to proceed with LLE angiography , likely later this week (as inpatient)  Hold DOAC in anticipation of proceedure  Appreciate care per Podiatry, ID, and primary    Osei Yeboah MD  Vascular Surgery  [facility]

## 2023-12-04 NOTE — SUBJECTIVE & OBJECTIVE
Interval History: remained afebrile overnight. States he does not feel well.     Review of Systems   Constitutional:  Negative for chills and fatigue.   Respiratory:  Negative for cough and shortness of breath.    Skin:  Positive for wound.   All other systems reviewed and are negative.    Objective:     Vital Signs (Most Recent):  Temp: 98.5 °F (36.9 °C) (12/04/23 1055)  Pulse: 67 (12/04/23 1055)  Resp: 19 (12/04/23 1055)  BP: 133/64 (12/04/23 1055)  SpO2: 98 % (12/04/23 1055) Vital Signs (24h Range):  Temp:  [98.3 °F (36.8 °C)-98.7 °F (37.1 °C)] 98.5 °F (36.9 °C)  Pulse:  [55-67] 67  Resp:  [16-20] 19  SpO2:  [96 %-99 %] 98 %  BP: (107-140)/(53-64) 133/64     Weight: 82.7 kg (182 lb 5.1 oz)  Body mass index is 25.43 kg/m².    Estimated Creatinine Clearance: 30.5 mL/min (A) (based on SCr of 2.3 mg/dL (H)).     Physical Exam  Vitals reviewed.   HENT:      Head: Normocephalic and atraumatic.   Cardiovascular:      Rate and Rhythm: Normal rate and regular rhythm.      Heart sounds: Murmur heard.   Pulmonary:      Effort: Pulmonary effort is normal.      Breath sounds: Normal breath sounds.   Abdominal:      General: Abdomen is flat. There is no distension.      Palpations: Abdomen is soft.      Tenderness: There is no abdominal tenderness.   Musculoskeletal:         General: Normal range of motion.   Skin:     Comments: Clean dressing on b/l feet  L leg and knee edematous. L knee warm. ROM intact.   Neurological:      General: No focal deficit present.      Mental Status: He is alert and oriented to person, place, and time.          Significant Labs: Blood Culture:   Recent Labs   Lab 09/21/23  1029 09/23/23  0929 09/23/23  0930 11/27/23  1512 11/27/23  1520   LABBLOO No Growth after 4 days.  No Growth after 4 days. No Growth after 4 days. No Growth after 4 days. Gram stain aer bottle: Gram positive cocci in clusters resembling Staph  Results called to and read back by:Kathleen Young east 11/28/2023  13:53   COAGULASE-NEGATIVE STAPHYLOCOCCUS SPECIES  Organism is a probable contaminant  * No Growth after 4 days.     Wound Culture:   Recent Labs   Lab 11/28/23  1347 12/01/23  1123   LABAERO Results called to and read back by:CHARITY STAFFORD 11/30/2023  10:39  METHICILLIN RESISTANT STAPHYLOCOCCUS AUREUS  Many  * No growth  No growth  No growth  No growth     All pertinent labs within the past 24 hours have been reviewed.    Significant Imaging: I have reviewed all pertinent imaging results/findings within the past 24 hours.

## 2023-12-04 NOTE — SUBJECTIVE & OBJECTIVE
Interval History: Pt noted to be afebrile and hemodynamically stable. Stable respiratory status. No acute events overnight. No new complaints this morning.     Review of Systems   Constitutional:  Positive for activity change and fatigue. Negative for fever.   Respiratory:  Negative for cough and shortness of breath.    Cardiovascular:  Negative for chest pain.   Gastrointestinal:  Negative for abdominal pain.   Musculoskeletal:  Positive for arthralgias.   Neurological:  Negative for dizziness and headaches.   Psychiatric/Behavioral:  Negative for confusion.    All other systems reviewed and are negative.    Objective:     Vital Signs (Most Recent):  Temp: 98.3 °F (36.8 °C) (12/04/23 1632)  Pulse: 65 (12/04/23 1632)  Resp: 20 (12/04/23 1632)  BP: 139/64 (12/04/23 1632)  SpO2: 98 % (12/04/23 1632) Vital Signs (24h Range):  Temp:  [98.3 °F (36.8 °C)-98.6 °F (37 °C)] 98.3 °F (36.8 °C)  Pulse:  [55-67] 65  Resp:  [16-20] 20  SpO2:  [96 %-99 %] 98 %  BP: (107-140)/(53-64) 139/64     Weight: 82.7 kg (182 lb 5.1 oz)  Body mass index is 25.43 kg/m².    Intake/Output Summary (Last 24 hours) at 12/4/2023 1705  Last data filed at 12/4/2023 0808  Gross per 24 hour   Intake 783.88 ml   Output 400 ml   Net 383.88 ml         Physical Exam  Vitals and nursing note reviewed.   Constitutional:       Appearance: Normal appearance.   HENT:      Head: Normocephalic and atraumatic.   Eyes:      Extraocular Movements: Extraocular movements intact.      Pupils: Pupils are equal, round, and reactive to light.   Cardiovascular:      Rate and Rhythm: Normal rate and regular rhythm.   Pulmonary:      Effort: Pulmonary effort is normal. No respiratory distress.   Abdominal:      General: Abdomen is flat. There is no distension.   Musculoskeletal:         General: Normal range of motion.      Cervical back: Normal range of motion.      Comments: Foot wound   Neurological:      General: No focal deficit present.      Mental Status: He is alert  and oriented to person, place, and time.   Psychiatric:         Mood and Affect: Mood normal.         Behavior: Behavior normal.             Significant Labs: All pertinent labs within the past 24 hours have been reviewed.  CBC:   Recent Labs   Lab 12/03/23  0508 12/04/23 0446   WBC 7.20 7.22   HGB 8.1* 8.0*   HCT 27.5* 27.1*    265     CMP:   Recent Labs   Lab 12/03/23  0508 12/04/23  0446    136   K 2.9* 3.2*   CL 91* 91*   CO2 30* 28   GLU 81 87   BUN 55* 55*   CREATININE 2.2* 2.3*   CALCIUM 9.6 9.6   ANIONGAP 15 17*       Significant Imaging: I have reviewed all pertinent imaging results/findings within the past 24 hours.

## 2023-12-04 NOTE — CONSULTS
"[facility]  Infectious Disease  Consult Note    Patient Name: Chato Bowie  MRN: [id number]  Admission Date: 11/27/2023  Hospital Length of Stay: 6 days  Attending Physician: Jose L Evans III, MD  Primary Care Provider: Irlanda Valenzuela -     Isolation Status: Contact    Patient information was obtained frompatient    Consults  Assessment/Plan:     ID  * Osteomyelitis of foot  73M admitted 11/27 from NH for b/l wounds. 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 Wound swab of R ulcer with MRSA. Podiatry now following- s/p bone biopsy and debridement, cultures NGTD, path pending.  ID consulted for "heel osteo with mrsa infection "    Needs 6 weeks of abx for osteomyelitis of foot. Strong suspicion for polymicrobial infection (including mrsa) so would continue anti-psuedomonal and anaerobe coverage for now.    Recommendations:   - continue vanc/cefepime/flagyl  - f/u pending path/cultures  - needs adequate perfusion to heal  - pressure offloading        Thank you for your consult. I will follow-up with patient. Please contact us if you have any additional questions.    Sasha Goldman MD  Infectious Disease  [facility] Surg    Subjective:     Principal Problem: Osteomyelitis of foot    HPI: 73M admitted 11/27 from NH for wounds. Pt reports his L knee has been aching for the last 3-4 months. Also reports b/l foot wounds and some recent swelling of leg and "I barely can walk". Denies f/c. Denies abx allergies. Denies indwelling hardware. There was plan for angio today, but cancelled because intervention not thought to be beneficial at this time.     Podiatry now following- Plan for OR debridement, bone biopsy tomorrow.       MRI   1. Osteomyelitis of the bilateral posterolateral calcaneus.  2. Osteomyelitis of the left 5th metatarsal head and possibly right 5th metatarsal head.      R foot wound cultures-          Aerobic culture [[id number]] (Abnormal)  Collected: 11/28/23 2011   Order Status: " "Completed Specimen: Wound from Foot, Right Updated: 11/30/23 1041    Aerobic Bacterial Culture Results called to and read back by:CHARITY STAFFORD 11/30/2023  10:39     METHICILLIN RESISTANT STAPHYLOCOCCUS AUREUS  Many   Abnormal    Susceptibility     Methicillin resistant Staphylococcus aureus     CULTURE, AEROBIC  (SPECIFY SOURCE)     Clindamycin <=0.5 mcg/mL Sensitive     Erythromycin >4 mcg/mL Resistant     Oxacillin >2 mcg/mL Resistant     Penicillin >8 mcg/mL Resistant     Tetracycline <=4 mcg/mL Sensitive     Trimeth/Sulfa <=0.5/9.5 m... Sensitive     Vancomycin 2 mcg/mL Sensitive                   On vanc/ceftriaxone     ID consulted for "heel osteo with mrsa infection "    Interval history: NAEO. Sleeping, but arousable    Past Surgical History:   Procedure Laterality Date    ECHOCARDIOGRAM,TRANSESOPHAGEAL N/A 9/21/2023    Procedure: Transesophageal echo (MARIA) intra-procedure log documentation;  Surgeon: Luisana Rodríguez MD;  Location: [facility] CATH LAB;  Service: Cardiology;  Laterality: N/A;    EYE SURGERY      TRANSESOPHAGEAL ECHOCARDIOGRAM WITH POSSIBLE CARDIOVERSION (MARIA W/ POSS CARDIOVERSION) N/A 1/5/2023    Procedure: Transesophageal echo (MARIA) intra-procedure log documentation;  Surgeon: Indra Foote MD;  Location: [facility] CATH LAB;  Service: Cardiology;  Laterality: N/A;    TRANSESOPHAGEAL ECHOCARDIOGRAPHY N/A 9/21/2023    Procedure: ECHOCARDIOGRAM, TRANSESOPHAGEAL;  Surgeon: Luisana Rodríguez MD;  Location: [facility] CATH LAB;  Service: Cardiology;  Laterality: N/A;    TREATMENT OF CARDIAC ARRHYTHMIA N/A 12/7/2020    Procedure: Cardioversion or Defibrillation;  Surgeon: Indra Foote MD;  Location: [facility] CATH LAB;  Service: Cardiology;  Laterality: N/A;    TREATMENT OF CARDIAC ARRHYTHMIA N/A 12/30/2020    Procedure: Cardioversion or Defibrillation;  Surgeon: Tyrone Steen MD;  Location: [facility] CATH LAB;  Service: Cardiology;  Laterality: N/A;    TREATMENT OF CARDIAC ARRHYTHMIA N/A 1/5/2023    " Procedure: Cardioversion or Defibrillation;  Surgeon: Indra Foote MD;  Location: [facility] CATH LAB;  Service: Cardiology;  Laterality: N/A;    VASCULAR SURGERY         Review of patient's allergies indicates:  No Known Allergies    No current facility-administered medications on file prior to encounter.     Current Outpatient Medications on File Prior to Encounter   Medication Sig    albuterol (PROVENTIL/VENTOLIN HFA) 90 mcg/actuation inhaler Inhale 2 puffs into the lungs every 6 (six) hours as needed for Wheezing or Shortness of Breath.    allopurinoL (ZYLOPRIM) 100 MG tablet Take 1 tablet (100 mg total) by mouth once daily.    amiodarone (PACERONE) 200 MG Tab Take 200 mg by mouth once daily.    ascorbic acid, vitamin C, (VITAMIN C) 500 MG tablet Take 500 mg by mouth 2 (two) times daily.    ELIQUIS 5 mg Tab Take 5 mg by mouth 2 (two) times daily.    ferrous sulfate (FEOSOL) 325 mg (65 mg iron) Tab tablet Take 325 mg by mouth once daily.    folic acid (FOLVITE) 1 MG tablet Take 1 mg by mouth once daily.    furosemide (LASIX) 80 MG tablet Take 1 tablet (80 mg total) by mouth 2 (two) times daily.    gabapentin (NEURONTIN) 100 MG capsule Take 1 capsule (100 mg total) by mouth 2 (two) times daily.    metOLazone (ZAROXOLYN) 5 MG tablet Take 1 tablet (5 mg total) by mouth once daily.    metoprolol succinate (TOPROL-XL) 25 MG 24 hr tablet Take 25 mg by mouth once daily.    multivitamin (ONE DAILY MULTIVITAMIN) per tablet Take 1 tablet by mouth once daily.    potassium chloride (MICRO-K) 10 MEQ CpSR Take 10 mEq by mouth once daily.    rosuvastatin (CRESTOR) 40 MG Tab Take 40 mg by mouth every evening.    senna-docusate 8.6-50 mg (PERICOLACE) 8.6-50 mg per tablet Take 2 tablets by mouth 2 (two) times daily as needed for Constipation.    tamsulosin (FLOMAX) 0.4 mg Cap Take 1 capsule (0.4 mg total) by mouth once daily.    insulin aspart U-100 (NOVOLOG) 100 unit/mL injection Inject into the skin. Per sliding scale      Family History       Problem Relation (Age of Onset)    Diabetes Mother, Father, Sister    Hypertension Mother, Father, Sister          Tobacco Use    Smoking status: Never    Smokeless tobacco: Never   Substance and Sexual Activity    Alcohol use: Not Currently     Alcohol/week: 6.0 standard drinks of alcohol     Types: 6 Cans of beer per week    Drug use: No    Sexual activity: Yes     Partners: Female     Review of Systems   Constitutional:  Negative for appetite change, chills, diaphoresis and fatigue.   HENT:  Negative for congestion, sore throat and tinnitus.    Eyes:  Negative for pain, discharge and itching.   Respiratory:  Negative for cough, chest tightness, shortness of breath and wheezing.    Cardiovascular:  Positive for palpitations and leg swelling. Negative for chest pain.   Gastrointestinal:  Negative for abdominal distention, abdominal pain, blood in stool, nausea and vomiting.   Endocrine: Negative for cold intolerance, heat intolerance and polydipsia.   Genitourinary:  Negative for difficulty urinating, dysuria, flank pain, frequency and hematuria.   Musculoskeletal:  Positive for joint swelling. Negative for arthralgias, back pain and myalgias.   Skin:  Positive for rash (generalized scaly rash). Negative for color change and pallor.   Neurological:  Positive for weakness. Negative for dizziness, seizures, facial asymmetry, light-headedness, numbness and headaches.   Psychiatric/Behavioral:  Negative for agitation, confusion and hallucinations.      Objective:     Vital Signs (Most Recent):  Temp: 98.3 °F (36.8 °C) (12/03/23 2000)  Pulse: (!) 56 (12/03/23 2000)  Resp: 16 (12/03/23 2000)  BP: 135/63 (12/03/23 2000)  SpO2: 98 % (12/03/23 2000) Vital Signs (24h Range):  Temp:  [98 °F (36.7 °C)-98.9 °F (37.2 °C)] 98.3 °F (36.8 °C)  Pulse:  [56-67] 56  Resp:  [16-22] 16  SpO2:  [95 %-99 %] 98 %  BP: (112-141)/(56-63) 135/63     Weight: 96.6 kg (213 lb)  Body mass index is 29.71 kg/m².      Physical Exam  Constitutional:       General: He is not in acute distress.     Appearance: He is ill-appearing. He is not toxic-appearing.   HENT:      Head: Normocephalic and atraumatic.      Nose: Nose normal. No congestion or rhinorrhea.      Mouth/Throat:      Mouth: Mucous membranes are moist.   Eyes:      Extraocular Movements: Extraocular movements intact.      Conjunctiva/sclera: Conjunctivae normal.      Pupils: Pupils are equal, round, and reactive to light.   Cardiovascular:      Rate and Rhythm: Regular rhythm. Bradycardia present.      Pulses: Normal pulses.      Heart sounds: Normal heart sounds.   Pulmonary:      Effort: Pulmonary effort is normal. No respiratory distress.      Breath sounds: Normal breath sounds. No stridor. No wheezing or rales.   Chest:      Chest wall: No tenderness.   Abdominal:      General: Abdomen is flat. Bowel sounds are normal. There is no distension.      Palpations: Abdomen is soft.      Tenderness: There is no abdominal tenderness. There is no right CVA tenderness, left CVA tenderness, guarding or rebound.   Musculoskeletal:         General: Swelling present. No tenderness.      Cervical back: Normal range of motion. No rigidity.      Right lower leg: Edema (+1) present.      Left lower leg: Edema (+2 pedal edema) present.   Skin:     General: Skin is warm and dry.      Coloration: Skin is not jaundiced.      Findings: Lesion and rash present.   Neurological:      General: No focal deficit present.      Mental Status: He is alert and oriented to person, place, and time. Mental status is at baseline.      Cranial Nerves: No cranial nerve deficit.      Sensory: No sensory deficit.   Psychiatric:         Mood and Affect: Mood normal.         Behavior: Behavior normal.         Thought Content: Thought content normal.              CRANIAL NERVES     CN III, IV, VI   Pupils are equal, round, and reactive to light.       Significant Labs: All pertinent labs within the past 24  "hours have been reviewed.  A1C:   Recent Labs   Lab 09/20/23  0446   HGBA1C 5.5       Bilirubin:   Recent Labs   Lab 11/27/23  1509   BILITOT 0.5       Blood Culture:   No results for input(s): "LABBLOO" in the last 48 hours.    CBC:   Recent Labs   Lab 12/02/23  1701 12/03/23  0508   WBC 7.40 7.20   HGB 8.4* 8.1*   HCT 27.8* 27.5*    275       CMP:   Recent Labs   Lab 12/02/23  1701 12/03/23  0508   * 136   K 3.0* 2.9*   CL 90* 91*   CO2 29 30*   * 81   BUN 54* 55*   CREATININE 2.2* 2.2*   CALCIUM 9.8 9.6   ANIONGAP 16 15       Cardiac Markers:   No results for input(s): "CKMB", "MYOGLOBIN", "BNP", "TROPISTAT" in the last 48 hours.    Coagulation:   No results for input(s): "PT", "INR", "APTT" in the last 48 hours.    Lactic Acid:   No results for input(s): "LACTATE" in the last 48 hours.      TSH:   Recent Labs   Lab 09/20/23  0446   TSH 3.224       Urine Studies:   No results for input(s): "COLORU", "APPEARANCEUA", "PHUR", "SPECGRAV", "PROTEINUA", "GLUCUA", "KETONESU", "BILIRUBINUA", "OCCULTUA", "NITRITE", "UROBILINOGEN", "LEUKOCYTESUR", "RBCUA", "WBCUA", "BACTERIA", "SQUAMEPITHEL", "HYALINECASTS" in the last 48 hours.    Invalid input(s): "WRIGHTSUR"      Significant Imaging: I have reviewed all pertinent imaging results/findings within the past 24 hours.  Imaging Results              X-Ray Chest AP Portable (Final result)  Result time 11/27/23 17:32:49      Final result by Jenna Owens MD (11/27/23 17:32:49)                   Impression:      Mild cardiomegaly.  Nonspecific prominence of the interstitium.  Probable bilateral pleural effusions.  The possibility of mild CHF cannot be entirely excluded.      Electronically signed by: Jenna Owens  Date:    11/27/2023  Time:    17:32               Narrative:    EXAMINATION:  AP PORTABLE CHEST    CLINICAL HISTORY:  pre op;    TECHNIQUE:  AP portable chest radiograph was submitted.    COMPARISON:  09/23/2023    FINDINGS:  AP portable " chest radiograph demonstrates mild enlargement of the cardiac silhouette.  There is tortuosity of the descending thoracic aorta.  There is mild nonspecific prominence of the interstitium.  There is subtle blunting of the costophrenic angles, which may suggest small pleural effusions and/or pleural thickening.  There is no focal consolidation or pneumothorax.                                       X-Ray Foot Complete Left (Final result)  Result time 11/27/23 17:30:04      Final result by Jenna Owens MD (11/27/23 17:30:04)                   Impression:      No acute bony abnormality detected.  Degenerative changes of the midfoot and the 1st MTP.      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    FINDINGS:  Three views of the right foot demonstrate no acute fracture or dislocation.  Degenerative changes are seen involving the 1st metatarsophalangeal joint and the midfoot.  Small Achilles and moderate plantar spurring is noted.  The bones are diffusely osteopenic.  There is advanced vascular calcifications.    Three views of the left foot demonstrate acute fracture or dislocation.  Degenerative changes are seen involving the 1st metatarsophalangeal joint and the midfoot. Small Achilles and moderate plantar spurring is noted. The bones are diffusely osteopenic. There is advanced vascular calcifications.                                       X-Ray Foot Complete Right (Final result)  Result time 11/27/23 17:30:04      Final result by Jenna Owens MD (11/27/23 17:30:04)                   Impression:      No acute bony abnormality detected.  Degenerative changes of the midfoot and the 1st  MTP.      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    FINDINGS:  Three views of the right foot demonstrate no acute fracture or dislocation.  Degenerative changes are seen involving the 1st metatarsophalangeal joint and the midfoot.  Small Achilles and moderate plantar spurring is noted.  The bones are diffusely osteopenic.  There is advanced vascular calcifications.    Three views of the left foot demonstrate acute fracture or dislocation.  Degenerative changes are seen involving the 1st metatarsophalangeal joint and the midfoot. Small Achilles and moderate plantar spurring is noted. The bones are diffusely osteopenic. There is advanced vascular calcifications.                                       X-Ray Knee 1 or 2 View Left (Final result)  Result time 11/27/23 17:22:44      Final result by Jenna Owens MD (11/27/23 17:22:44)                   Impression:      No acute bony abnormality detected.  Large knee joint effusion.  Relatively stable degenerative changes.    Bone infarcts.      Electronically signed by: Jenna Owens  Date:    11/27/2023  Time:    17:22               Narrative:    EXAMINATION:  TWO VIEWS OF THE LEFT KNEE    CLINICAL HISTORY:  Effusion, left knee    TECHNIQUE:  AP and lateral view of the left knee    COMPARISON:  07/31/2023    FINDINGS:  Two views of the left knee demonstrate no acute fracture or dislocation.  There is narrowing and continued sclerosing of the medial knee joint compartment.  In addition, there is narrowing of the inferior aspect of the patellofemoral space.  There is mild patellar spurring.  There is a large suprapatellar effusion.  Again seen are amorphous  sclerotic densities in the visualized femur and tibia, which are likely bone infarcts.  Bones are osteopenic.  Advanced vascular calcifications are present.

## 2023-12-04 NOTE — SUBJECTIVE & OBJECTIVE
Interval history: NAEO. Sleeping, but arousable    Past Surgical History:   Procedure Laterality Date    ECHOCARDIOGRAM,TRANSESOPHAGEAL N/A 9/21/2023    Procedure: Transesophageal echo (MARIA) intra-procedure log documentation;  Surgeon: Luisana Rodríguez MD;  Location: [facility] CATH LAB;  Service: Cardiology;  Laterality: N/A;    EYE SURGERY      TRANSESOPHAGEAL ECHOCARDIOGRAM WITH POSSIBLE CARDIOVERSION (MARIA W/ POSS CARDIOVERSION) N/A 1/5/2023    Procedure: Transesophageal echo (MARIA) intra-procedure log documentation;  Surgeon: Indra Foote MD;  Location: [facility] CATH LAB;  Service: Cardiology;  Laterality: N/A;    TRANSESOPHAGEAL ECHOCARDIOGRAPHY N/A 9/21/2023    Procedure: ECHOCARDIOGRAM, TRANSESOPHAGEAL;  Surgeon: Luisana Rodríguez MD;  Location: [facility] CATH LAB;  Service: Cardiology;  Laterality: N/A;    TREATMENT OF CARDIAC ARRHYTHMIA N/A 12/7/2020    Procedure: Cardioversion or Defibrillation;  Surgeon: Indra Foote MD;  Location: [facility] CATH LAB;  Service: Cardiology;  Laterality: N/A;    TREATMENT OF CARDIAC ARRHYTHMIA N/A 12/30/2020    Procedure: Cardioversion or Defibrillation;  Surgeon: Tyrone Steen MD;  Location: [facility] CATH LAB;  Service: Cardiology;  Laterality: N/A;    TREATMENT OF CARDIAC ARRHYTHMIA N/A 1/5/2023    Procedure: Cardioversion or Defibrillation;  Surgeon: Indra Foote MD;  Location: [facility] CATH LAB;  Service: Cardiology;  Laterality: N/A;    VASCULAR SURGERY         Review of patient's allergies indicates:  No Known Allergies    No current facility-administered medications on file prior to encounter.     Current Outpatient Medications on File Prior to Encounter   Medication Sig    albuterol (PROVENTIL/VENTOLIN HFA) 90 mcg/actuation inhaler Inhale 2 puffs into the lungs every 6 (six) hours as needed for Wheezing or Shortness of Breath.    allopurinoL (ZYLOPRIM) 100 MG tablet Take 1 tablet (100 mg total) by mouth once daily.    amiodarone (PACERONE) 200 MG Tab Take 200 mg by mouth  once daily.    ascorbic acid, vitamin C, (VITAMIN C) 500 MG tablet Take 500 mg by mouth 2 (two) times daily.    ELIQUIS 5 mg Tab Take 5 mg by mouth 2 (two) times daily.    ferrous sulfate (FEOSOL) 325 mg (65 mg iron) Tab tablet Take 325 mg by mouth once daily.    folic acid (FOLVITE) 1 MG tablet Take 1 mg by mouth once daily.    furosemide (LASIX) 80 MG tablet Take 1 tablet (80 mg total) by mouth 2 (two) times daily.    gabapentin (NEURONTIN) 100 MG capsule Take 1 capsule (100 mg total) by mouth 2 (two) times daily.    metOLazone (ZAROXOLYN) 5 MG tablet Take 1 tablet (5 mg total) by mouth once daily.    metoprolol succinate (TOPROL-XL) 25 MG 24 hr tablet Take 25 mg by mouth once daily.    multivitamin (ONE DAILY MULTIVITAMIN) per tablet Take 1 tablet by mouth once daily.    potassium chloride (MICRO-K) 10 MEQ CpSR Take 10 mEq by mouth once daily.    rosuvastatin (CRESTOR) 40 MG Tab Take 40 mg by mouth every evening.    senna-docusate 8.6-50 mg (PERICOLACE) 8.6-50 mg per tablet Take 2 tablets by mouth 2 (two) times daily as needed for Constipation.    tamsulosin (FLOMAX) 0.4 mg Cap Take 1 capsule (0.4 mg total) by mouth once daily.    insulin aspart U-100 (NOVOLOG) 100 unit/mL injection Inject into the skin. Per sliding scale     Family History       Problem Relation (Age of Onset)    Diabetes Mother, Father, Sister    Hypertension Mother, Father, Sister          Tobacco Use    Smoking status: Never    Smokeless tobacco: Never   Substance and Sexual Activity    Alcohol use: Not Currently     Alcohol/week: 6.0 standard drinks of alcohol     Types: 6 Cans of beer per week    Drug use: No    Sexual activity: Yes     Partners: Female     Review of Systems   Constitutional:  Negative for appetite change, chills, diaphoresis and fatigue.   HENT:  Negative for congestion, sore throat and tinnitus.    Eyes:  Negative for pain, discharge and itching.   Respiratory:  Negative for cough, chest tightness, shortness of breath and  wheezing.    Cardiovascular:  Positive for palpitations and leg swelling. Negative for chest pain.   Gastrointestinal:  Negative for abdominal distention, abdominal pain, blood in stool, nausea and vomiting.   Endocrine: Negative for cold intolerance, heat intolerance and polydipsia.   Genitourinary:  Negative for difficulty urinating, dysuria, flank pain, frequency and hematuria.   Musculoskeletal:  Positive for joint swelling. Negative for arthralgias, back pain and myalgias.   Skin:  Positive for rash (generalized scaly rash). Negative for color change and pallor.   Neurological:  Positive for weakness. Negative for dizziness, seizures, facial asymmetry, light-headedness, numbness and headaches.   Psychiatric/Behavioral:  Negative for agitation, confusion and hallucinations.      Objective:     Vital Signs (Most Recent):  Temp: 98.3 °F (36.8 °C) (12/03/23 2000)  Pulse: (!) 56 (12/03/23 2000)  Resp: 16 (12/03/23 2000)  BP: 135/63 (12/03/23 2000)  SpO2: 98 % (12/03/23 2000) Vital Signs (24h Range):  Temp:  [98 °F (36.7 °C)-98.9 °F (37.2 °C)] 98.3 °F (36.8 °C)  Pulse:  [56-67] 56  Resp:  [16-22] 16  SpO2:  [95 %-99 %] 98 %  BP: (112-141)/(56-63) 135/63     Weight: 96.6 kg (213 lb)  Body mass index is 29.71 kg/m².     Physical Exam  Constitutional:       General: He is not in acute distress.     Appearance: He is ill-appearing. He is not toxic-appearing.   HENT:      Head: Normocephalic and atraumatic.      Nose: Nose normal. No congestion or rhinorrhea.      Mouth/Throat:      Mouth: Mucous membranes are moist.   Eyes:      Extraocular Movements: Extraocular movements intact.      Conjunctiva/sclera: Conjunctivae normal.      Pupils: Pupils are equal, round, and reactive to light.   Cardiovascular:      Rate and Rhythm: Regular rhythm. Bradycardia present.      Pulses: Normal pulses.      Heart sounds: Normal heart sounds.   Pulmonary:      Effort: Pulmonary effort is normal. No respiratory distress.      Breath  "sounds: Normal breath sounds. No stridor. No wheezing or rales.   Chest:      Chest wall: No tenderness.   Abdominal:      General: Abdomen is flat. Bowel sounds are normal. There is no distension.      Palpations: Abdomen is soft.      Tenderness: There is no abdominal tenderness. There is no right CVA tenderness, left CVA tenderness, guarding or rebound.   Musculoskeletal:         General: Swelling present. No tenderness.      Cervical back: Normal range of motion. No rigidity.      Right lower leg: Edema (+1) present.      Left lower leg: Edema (+2 pedal edema) present.   Skin:     General: Skin is warm and dry.      Coloration: Skin is not jaundiced.      Findings: Lesion and rash present.   Neurological:      General: No focal deficit present.      Mental Status: He is alert and oriented to person, place, and time. Mental status is at baseline.      Cranial Nerves: No cranial nerve deficit.      Sensory: No sensory deficit.   Psychiatric:         Mood and Affect: Mood normal.         Behavior: Behavior normal.         Thought Content: Thought content normal.              CRANIAL NERVES     CN III, IV, VI   Pupils are equal, round, and reactive to light.       Significant Labs: All pertinent labs within the past 24 hours have been reviewed.  A1C:   Recent Labs   Lab 09/20/23  0446   HGBA1C 5.5       Bilirubin:   Recent Labs   Lab 11/27/23  1509   BILITOT 0.5       Blood Culture:   No results for input(s): "LABBLOO" in the last 48 hours.    CBC:   Recent Labs   Lab 12/02/23  1701 12/03/23  0508   WBC 7.40 7.20   HGB 8.4* 8.1*   HCT 27.8* 27.5*    275       CMP:   Recent Labs   Lab 12/02/23  1701 12/03/23  0508   * 136   K 3.0* 2.9*   CL 90* 91*   CO2 29 30*   * 81   BUN 54* 55*   CREATININE 2.2* 2.2*   CALCIUM 9.8 9.6   ANIONGAP 16 15       Cardiac Markers:   No results for input(s): "CKMB", "MYOGLOBIN", "BNP", "TROPISTAT" in the last 48 hours.    Coagulation:   No results for input(s): "PT", " ""INR", "APTT" in the last 48 hours.    Lactic Acid:   No results for input(s): "LACTATE" in the last 48 hours.      TSH:   Recent Labs   Lab 09/20/23  0446   TSH 3.224       Urine Studies:   No results for input(s): "COLORU", "APPEARANCEUA", "PHUR", "SPECGRAV", "PROTEINUA", "GLUCUA", "KETONESU", "BILIRUBINUA", "OCCULTUA", "NITRITE", "UROBILINOGEN", "LEUKOCYTESUR", "RBCUA", "WBCUA", "BACTERIA", "SQUAMEPITHEL", "HYALINECASTS" in the last 48 hours.    Invalid input(s): "WRIGHTSUR"      Significant Imaging: I have reviewed all pertinent imaging results/findings within the past 24 hours.  Imaging Results              X-Ray Chest AP Portable (Final result)  Result time 11/27/23 17:32:49      Final result by Jenna Owens MD (11/27/23 17:32:49)                   Impression:      Mild cardiomegaly.  Nonspecific prominence of the interstitium.  Probable bilateral pleural effusions.  The possibility of mild CHF cannot be entirely excluded.      Electronically signed by: Jenna Owens  Date:    11/27/2023  Time:    17:32               Narrative:    EXAMINATION:  AP PORTABLE CHEST    CLINICAL HISTORY:  pre op;    TECHNIQUE:  AP portable chest radiograph was submitted.    COMPARISON:  09/23/2023    FINDINGS:  AP portable chest radiograph demonstrates mild enlargement of the cardiac silhouette.  There is tortuosity of the descending thoracic aorta.  There is mild nonspecific prominence of the interstitium.  There is subtle blunting of the costophrenic angles, which may suggest small pleural effusions and/or pleural thickening.  There is no focal consolidation or pneumothorax.                                       X-Ray Foot Complete Left (Final result)  Result time 11/27/23 17:30:04      Final result by Jenna Owens MD (11/27/23 17:30:04)                   Impression:      No acute bony abnormality detected.  Degenerative changes of the midfoot and the 1st MTP.      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    FINDINGS:  Three views of the right foot demonstrate no acute fracture or dislocation.  Degenerative changes are seen involving the 1st metatarsophalangeal joint and the midfoot.  Small Achilles and moderate plantar spurring is noted.  The bones are diffusely osteopenic.  There is advanced vascular calcifications.    Three views of the left foot demonstrate acute fracture or dislocation.  Degenerative changes are seen involving the 1st metatarsophalangeal joint and the midfoot. Small Achilles and moderate plantar spurring is noted. The bones are diffusely osteopenic. There is advanced vascular calcifications.                                       X-Ray Foot Complete Right (Final result)  Result time 11/27/23 17:30:04      Final result by Jenna Owens MD (11/27/23 17:30:04)                   Impression:      No acute bony abnormality detected.  Degenerative changes of the midfoot and the 1st MTP.      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    FINDINGS:  Three views of the right foot demonstrate no acute fracture or dislocation.  Degenerative changes are seen involving the 1st metatarsophalangeal joint and the midfoot.  Small Achilles and moderate plantar spurring is noted.  The bones are diffusely osteopenic.  There is advanced vascular calcifications.    Three views of the left foot  demonstrate acute fracture or dislocation.  Degenerative changes are seen involving the 1st metatarsophalangeal joint and the midfoot. Small Achilles and moderate plantar spurring is noted. The bones are diffusely osteopenic. There is advanced vascular calcifications.                                       X-Ray Knee 1 or 2 View Left (Final result)  Result time 11/27/23 17:22:44      Final result by Jenna Owens MD (11/27/23 17:22:44)                   Impression:      No acute bony abnormality detected.  Large knee joint effusion.  Relatively stable degenerative changes.    Bone infarcts.      Electronically signed by: Jenna Owens  Date:    11/27/2023  Time:    17:22               Narrative:    EXAMINATION:  TWO VIEWS OF THE LEFT KNEE    CLINICAL HISTORY:  Effusion, left knee    TECHNIQUE:  AP and lateral view of the left knee    COMPARISON:  07/31/2023    FINDINGS:  Two views of the left knee demonstrate no acute fracture or dislocation.  There is narrowing and continued sclerosing of the medial knee joint compartment.  In addition, there is narrowing of the inferior aspect of the patellofemoral space.  There is mild patellar spurring.  There is a large suprapatellar effusion.  Again seen are amorphous sclerotic densities in the visualized femur and tibia, which are likely bone infarcts.  Bones are osteopenic.  Advanced vascular calcifications are present.

## 2023-12-04 NOTE — PROGRESS NOTES
[place] - Med Surg  Podiatry  Progress Note    Patient Name: Chato Bowie  MRN: [id number]  Admission Date: 11/27/2023  Hospital Length of Stay: 7 days  Attending Physician: Nhung Martínez MD  Primary Care Provider: Irlanda Valenzuela -     Consults  Subjective:     History of Present Illness: 73 y /o male PMH DM2 Admitted for multiple wounds and left knee pain. Patient currently resides in NH reports wounds present x several months. Reports pain worse R>L. Heel protector boots present.     11/30/23: Patient seen bedside. Heel protector boots in place B/L. Angio scheduled for today with Dr. Yeboah however now canceled as vascular did not feel intervention would be beneficial to the pt at this time.     MRI revealing OM B/L calcaneus Left 5th metatarsal possible OM right 5th metatarsal.    12/4/23: S/p 3 days B/L debridement, bone biopsy. Heel protector boots in place.     Scheduled Meds:   amiodarone  200 mg Oral Daily    apixaban  5 mg Oral BID    aspirin  81 mg Oral Daily    atorvastatin  40 mg Oral QHS    ceFEPime (MAXIPIME) IVPB  2 g Intravenous Q12H    ferrous sulfate  1 tablet Oral BID    folic acid  1 mg Oral Daily    furosemide  40 mg Oral Daily    insulin aspart U-100  2 Units Subcutaneous TIDWM    insulin detemir U-100  8 Units Subcutaneous BID    metOLazone  5 mg Oral Daily    metoprolol succinate  12.5 mg Oral Daily    metronidazole  500 mg Intravenous Q8H    mupirocin   Nasal BID    potassium chloride  40 mEq Oral BID    tamsulosin  0.4 mg Oral Daily     Continuous Infusions:  PRN Meds:acetaminophen, acetaminophen, cadexomer iodine, dextrose 10%, dextrose 10%, glucagon (human recombinant), glucose, glucose, HYDROcodone-acetaminophen, HYDROmorphone, insulin aspart U-100, melatonin, naloxone, ondansetron, ondansetron, oxyCODONE-acetaminophen, potassium bicarbonate, potassium bicarbonate, senna-docusate 8.6-50 mg, simethicone, sodium chloride 0.9%, Pharmacy to dose Vancomycin consult **AND**  vancomycin - pharmacy to dose    Review of patient's allergies indicates:  No Known Allergies     Past Medical History:   Diagnosis Date    Acute congestive heart failure 3/20/2020    Alcohol abuse     Arthritis     Asthma attack 11/24/2021    Coronary artery disease     Diabetes mellitus     Hyperlipemia     Hypertension     Multiple thyroid nodules 6/14/2023    Rhabdomyolysis      Past Surgical History:   Procedure Laterality Date    ECHOCARDIOGRAM,TRANSESOPHAGEAL N/A 9/21/2023    Procedure: Transesophageal echo (MARIA) intra-procedure log documentation;  Surgeon: Luisana Rodríguez MD;  Location: [facility] CATH LAB;  Service: Cardiology;  Laterality: N/A;    EYE SURGERY      IRRIGATION AND DEBRIDEMENT Bilateral 12/1/2023    Procedure: IRRIGATION AND DEBRIDEMENT;  Surgeon: Jenna Gutiérrez DPM;  Location: [facility] OR;  Service: Podiatry;  Laterality: Bilateral;  Request antibiotic beads    TRANSESOPHAGEAL ECHOCARDIOGRAM WITH POSSIBLE CARDIOVERSION (MARIA W/ POSS CARDIOVERSION) N/A 1/5/2023    Procedure: Transesophageal echo (MARIA) intra-procedure log documentation;  Surgeon: Indra Foote MD;  Location: [facility] CATH LAB;  Service: Cardiology;  Laterality: N/A;    TRANSESOPHAGEAL ECHOCARDIOGRAPHY N/A 9/21/2023    Procedure: ECHOCARDIOGRAM, TRANSESOPHAGEAL;  Surgeon: Luisana Rodríguez MD;  Location: [facility] CATH LAB;  Service: Cardiology;  Laterality: N/A;    TREATMENT OF CARDIAC ARRHYTHMIA N/A 12/7/2020    Procedure: Cardioversion or Defibrillation;  Surgeon: Indra Foote MD;  Location: [facility] CATH LAB;  Service: Cardiology;  Laterality: N/A;    TREATMENT OF CARDIAC ARRHYTHMIA N/A 12/30/2020    Procedure: Cardioversion or Defibrillation;  Surgeon: Tyrone Steen MD;  Location: [facility] CATH LAB;  Service: Cardiology;  Laterality: N/A;    TREATMENT OF CARDIAC ARRHYTHMIA N/A 1/5/2023    Procedure: Cardioversion or Defibrillation;  Surgeon: Indra Foote MD;  Location: [facility] CATH LAB;  Service: Cardiology;  Laterality: N/A;     VASCULAR SURGERY         Family History       Problem Relation (Age of Onset)    Diabetes Mother, Father, Sister    Hypertension Mother, Father, Sister          Tobacco Use    Smoking status: Never    Smokeless tobacco: Never   Substance and Sexual Activity    Alcohol use: Not Currently     Alcohol/week: 6.0 standard drinks of alcohol     Types: 6 Cans of beer per week    Drug use: No    Sexual activity: Yes     Partners: Female     Review of Systems   Constitutional:  Positive for activity change.   Respiratory:  Negative for shortness of breath.    Cardiovascular:  Negative for chest pain and leg swelling.   Gastrointestinal:  Negative for nausea and vomiting.   Musculoskeletal:  Positive for arthralgias.   Skin:  Positive for wound.   Neurological:  Positive for numbness. Negative for weakness.     Objective:     Vital Signs (Most Recent):  Temp: 98.5 °F (36.9 °C) (12/04/23 1055)  Pulse: 67 (12/04/23 1055)  Resp: 19 (12/04/23 1055)  BP: 133/64 (12/04/23 1055)  SpO2: 98 % (12/04/23 1055) Vital Signs (24h Range):  Temp:  [98.3 °F (36.8 °C)-98.7 °F (37.1 °C)] 98.5 °F (36.9 °C)  Pulse:  [55-67] 67  Resp:  [16-20] 19  SpO2:  [96 %-99 %] 98 %  BP: (107-140)/(53-64) 133/64     Weight: 82.7 kg (182 lb 5.1 oz)  Body mass index is 25.43 kg/m².    Foot Exam    General  Orientation: alert and oriented to person, place, and time       Right Foot/Ankle     Inspection and Palpation  Skin Exam: ulcer;     Neurovascular  Dorsalis pedis: 1+  Posterior tibial: 1+  Saphenous nerve sensation: diminished  Tibial nerve sensation: diminished  Superficial peroneal nerve sensation: diminished  Deep peroneal nerve sensation: diminished  Sural nerve sensation: diminished      Left Foot/Ankle      Inspection and Palpation  Skin Exam: ulcer;     Neurovascular  Dorsalis pedis: 1+  Posterior tibial: 1+  Saphenous nerve sensation: diminished  Tibial nerve sensation: diminished  Superficial peroneal nerve sensation: diminished  Deep peroneal  nerve sensation: diminished  Sural nerve sensation: diminished        12/4/23:    Right lateral foot. Antibiotic beads in place.       Left lateral heel stable eschar      Left lateral foot necrotic base         11/30/23:    Right heel       Right lateral foot       Left foot       Left lateral foot          11/28/23:  Right lateral foot post debridement purulent drainage noted.       Left foot pre debridement       Stable eschar left       Stable eschar right           S/p debridement left foot.         Laboratory:  CBC:   Recent Labs   Lab 12/04/23  0446   WBC 7.22   RBC 3.51*   HGB 8.0*   HCT 27.1*      MCV 77*   MCH 22.8*   MCHC 29.5*       CMP:   Recent Labs   Lab 12/04/23  0446   GLU 87   CALCIUM 9.6      K 3.2*   CO2 28   CL 91*   BUN 55*   CREATININE 2.3*         Diagnostic Results:  X-Ray Foot Complete Right  Order: [id number]  Status: Final result       Visible to patient: No (inaccessible in Patient Portal)       Next appt: 11/30/2023 at 11:00 AM in Interventional Radiology ([facility] IR1)       Dx: Foot ulcer    0 Result Notes  Details    Reading Physician Reading Date Result Priority   Jenna Owens MD  [phone] 11/27/2023 STAT     Narrative & Impression  EXAMINATION:  THREE VIEWS OF THE BILATERAL FEET     CLINICAL HISTORY:  Non-pressure chronic ulcer of other part of unspecified foot with unspecified severity     TECHNIQUE:  AP, lateral, and oblique view of the both feet     COMPARISON:  None.     FINDINGS:  Three views of the right foot demonstrate no acute fracture or dislocation.  Degenerative changes are seen involving the 1st metatarsophalangeal joint and the midfoot.  Small Achilles and moderate plantar spurring is noted.  The bones are diffusely osteopenic.  There is advanced vascular calcifications.     Three views of the left foot demonstrate acute fracture or dislocation.  Degenerative changes are seen involving the 1st metatarsophalangeal joint and the midfoot. Small  Achilles and moderate plantar spurring is noted. The bones are diffusely osteopenic. There is advanced vascular calcifications.     Impression:     No acute bony abnormality detected.  Degenerative changes of the midfoot and the 1st MTP.        X-Ray Foot Complete Left  Order: [id number]  Status: Final result       Visible to patient: No (inaccessible in Patient Portal)       Next appt: 11/30/2023 at 11:00 AM in Interventional Radiology ([facility] IR1)       Dx: Foot ulcer    0 Result Notes  Details    Reading Physician Reading Date Result Priority   Jenna Owens MD  [phone] 11/27/2023      Narrative & Impression  EXAMINATION:  THREE VIEWS OF THE BILATERAL FEET     CLINICAL HISTORY:  Non-pressure chronic ulcer of other part of unspecified foot with unspecified severity     TECHNIQUE:  AP, lateral, and oblique view of the both feet     COMPARISON:  None.     FINDINGS:  Three views of the right foot demonstrate no acute fracture or dislocation.  Degenerative changes are seen involving the 1st metatarsophalangeal joint and the midfoot.  Small Achilles and moderate plantar spurring is noted.  The bones are diffusely osteopenic.  There is advanced vascular calcifications.     Three views of the left foot demonstrate acute fracture or dislocation.  Degenerative changes are seen involving the 1st metatarsophalangeal joint and the midfoot. Small Achilles and moderate plantar spurring is noted. The bones are diffusely osteopenic. There is advanced vascular calcifications.     Impression:     No acute bony abnormality detected.  Degenerative changes of the midfoot and the 1st MTP.        MRI : MRI Foot (Hindfoot) Left Without Contrast  Order: [id number]  Status: Final result       Visible to patient: No (inaccessible in Patient Portal)       Next appt: 12/14/2023 at 02:20 PM in Urology (Amirah Schneider MD)       Dx: Foot ulcer    0 Result Notes  Details    Reading Physician Reading Date Result Priority   Reinaldo  MD Desmond  [phone] 11/29/2023      Narrative & Impression  EXAMINATION:  MRI FOOT (FOREFOOT) LEFT WITHOUT CONTRAST; MRI FOOT (HINDFOOT) LEFT WITHOUT CONTRAST; MRI FOOT (HINDFOOT) RIGHT WITHOUT CONTRAST; MRI FOOT (FOREFOOT) RIGHT WITHOUT CONTRAST     CLINICAL HISTORY:  Foot swelling, diabetic, osteomyelitis suspected, xray done;r/o Left lateral foot ulceration;; Foot swelling, diabetic, osteomyelitis suspected, xray done;Left heel decubitus r/o OM;; Foot swelling, diabetic, osteomyelitis suspected, xray done;r/o OM right heel;; Foot swelling, diabetic, osteomyelitis suspected, xray done;r/o OM, abscess right lateral foot.;  Non-pressure chronic ulcer of other part of unspecified foot with unspecified severity     TECHNIQUE:  MRI of the right forefoot and hindfoot without contrast.     MRI of the left forefoot and hindfoot without contrast     COMPARISON:  Radiographs dated 11/27/2023     FINDINGS:  RIGHT:     There is ulceration over the 5th metatarsal head with subjacent bone marrow edema.  No geographic marrow replacement or definite erosion.  If ulcer probes to bone, appearance may be seen with early osteomyelitis.     There is large ulceration over the posterolateral calcaneus.  There is bone marrow edema and mild T1 hypointensity of the subjacent calcaneus in keeping with early osteomyelitis.     No fracture.  No evidence for diffuse marrow infiltrative process.  Ankle tendons and ligaments are unremarkable.  There is thickening of the plantar fascia central cord in keeping with plantar fasciitis.  There is moderate to severe atrophy of intrinsic foot musculature.  There is generalized subcutaneous edema.     LEFT:     There is ulceration over the 5th metatarsal head with subjacent bone marrow edema and mild T1 hypointensity in keeping with early osteomyelitis.     There is large ulceration over the posterolateral calcaneus.  There is bone marrow edema and mild T1 hypointensity of the subjacent calcaneus  in keeping with early osteomyelitis.     Note made of subchondral bone marrow edema at the 4th and 5th TMT joint, likely reactive.     No fracture.  No evidence for diffuse marrow infiltrative process.  There is a longitudinal split tear of the peroneus brevis tendon.  There is thickening of the plantar fascia central cord in keeping with plantar fasciitis.  There is moderate to severe atrophy of intrinsic foot musculature.  There is generalized subcutaneous edema.     Impression:     1. Osteomyelitis of the bilateral posterolateral calcaneus.  2. Osteomyelitis of the left 5th metatarsal head and possibly right 5th metatarsal head.  3. Additional findings above.        Clinical Findings:  B/L heel decubitus stable .   Assessment/Plan:     Active Diagnoses:    Diagnosis Date Noted POA    PRINCIPAL PROBLEM:  Osteomyelitis of foot [M86.9] 11/30/2023 Yes    Chronic diastolic heart failure [I50.32] 11/28/2023 Yes    Atherosclerosis of native artery of extremity [I70.209] 11/28/2023 Yes    Multiple wounds [T07.XXXA] 07/28/2023 Yes     Chronic    UTI (urinary tract infection) [N39.0] 09/05/2021 Yes    Paroxysmal atrial flutter [I48.92] 12/07/2020 Yes     Chronic    BPH (benign prostatic hyperplasia) [N40.0] 11/24/2020 Yes    Stage 3b chronic kidney disease (CKD) [N18.32] 11/24/2020 Yes    Left knee pain [M25.562] 03/23/2020 Yes    Alcohol abuse [F10.10] 02/14/2019 Yes    Type 2 diabetes mellitus with kidney complication, with long-term current use of insulin [E11.29, Z79.4] 03/10/2017 Not Applicable     Chronic    GERD (gastroesophageal reflux disease) [K21.9] 03/10/2017 Yes     Chronic    Essential hypertension [I10] 03/10/2017 Yes    Anemia [D64.9] 03/10/2017 Yes    Dyslipidemia [E78.5] 03/10/2017 Yes      Problems Resolved During this Admission:    Diagnosis Date Noted Date Resolved POA    DJD (degenerative joint disease) [M19.90] 06/14/2023 11/30/2023 Yes       S/p 3 days OR debridement/ bone biopsy B/L feet.Left heel  and lateral foot with necrotic base. Abx beads in place to all wounds. DSD applied.       Vascular surgery consulted- Discussed case with Dr. Yeboah- s/p 3 days B/L debridement left heel and lateral foot ulceration with necrotic base.     Abx per ID    Nursing orders placed for daily dressing changes.     DSD applied.     Thank you for your consult. I will follow-up with patient. Please contact us if you have any additional questions.    Jenna Gutiérrez DPM  Podiatry  [facility] - Med Surg

## 2023-12-04 NOTE — ASSESSMENT & PLAN NOTE
History of multiple decubitus ulcers, previous buttock ulcers now healed  Has bilateral heel wound and lat rt foot  CRP 57.8,Sed rate 83,with procalcitonin elevated at 6.09  Podiatry and ID consulted  -S/p OR debridement/ bone biopsy B/L feet.Left heel and lateral foot with necrotic base. Abx beads in place to all wounds. DSD applied.   -wound cultures growing MRSA. Started on vancomycin/cefepime/flagyl per id while pending cultures.   -Vascular surgery consulted

## 2023-12-04 NOTE — ASSESSMENT & PLAN NOTE
- His of paroxysmal atrial fibrillation with multiple MARIA and cardiovesion  - Currently on amiodarone and metoprolol 50xl  - Cont apixan due to risk of thromboembolic stroke  - continue to monitor on telemetry   - Maintain K > 4, Mg > 2

## 2023-12-04 NOTE — PROGRESS NOTES
"[facility]  Infectious Disease  Progress Note    Patient Name: Chato Bowie  MRN: [id number]  Admission Date: 11/27/2023  Length of Stay: 7 days  Attending Physician: Nhung Martínez MD  Primary Care Provider: Irlanda Valenzuela -    Isolation Status: Contact  Assessment/Plan:      ID  * Osteomyelitis of foot  73M admitted 11/27 from NH for b/l wounds. 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 Wound swab of R ulcer with MRSA. Podiatry now following- s/p bone biopsy and debridement, cultures NGTD, path pending.  ID consulted for "heel osteo with mrsa infection "    Needs 6 weeks of abx for osteomyelitis of foot. Strong suspicion for polymicrobial infection (including mrsa) so would continue anti-psuedomonal and anaerobe coverage for now. Also with large left prepatellar effusion s/p dry tap- ortho following and suspect 2/2 arthritis.    Recommendations:   - continue vanc/cefepime/flagyl  - f/u pending path/cultures  - needs adequate perfusion to heal  - pressure offloading        Anticipated Disposition: tbd    Thank you for your consult. I will follow-up with patient. Please contact us if you have any additional questions.    Susanne Dumont MD  Infectious Disease  [facility] Surg    Subjective:     Principal Problem:Osteomyelitis of foot    HPI: 73M admitted 11/27 from NH for wounds. Pt reports his L knee has been aching for the last 3-4 months. Also reports b/l foot wounds and some recent swelling of leg and "I barely can walk". Denies f/c. Denies abx allergies. Denies indwelling hardware. There was plan for angio today, but cancelled because intervention not thought to be beneficial at this time.     Podiatry now following- Plan for OR debridement, bone biopsy tomorrow.       MRI   1. Osteomyelitis of the bilateral posterolateral calcaneus.  2. Osteomyelitis of the left 5th metatarsal head and possibly right 5th metatarsal head.      R foot wound cultures-          Aerobic culture " "[[id number]] (Abnormal)  Collected: 11/28/23 1347   Order Status: Completed Specimen: Wound from Foot, Right Updated: 11/30/23 1041    Aerobic Bacterial Culture Results called to and read back by:CHARITY STAFFORD 11/30/2023  10:39     METHICILLIN RESISTANT STAPHYLOCOCCUS AUREUS  Many   Abnormal    Susceptibility     Methicillin resistant Staphylococcus aureus     CULTURE, AEROBIC  (SPECIFY SOURCE)     Clindamycin <=0.5 mcg/mL Sensitive     Erythromycin >4 mcg/mL Resistant     Oxacillin >2 mcg/mL Resistant     Penicillin >8 mcg/mL Resistant     Tetracycline <=4 mcg/mL Sensitive     Trimeth/Sulfa <=0.5/9.5 m... Sensitive     Vancomycin 2 mcg/mL Sensitive                   On vanc/ceftriaxone     ID consulted for "heel osteo with mrsa infection "  Interval History: remained afebrile overnight. States he does not feel well.     Review of Systems   Constitutional:  Negative for chills and fatigue.   Respiratory:  Negative for cough and shortness of breath.    Skin:  Positive for wound.   All other systems reviewed and are negative.    Objective:     Vital Signs (Most Recent):  Temp: 98.5 °F (36.9 °C) (12/04/23 1055)  Pulse: 67 (12/04/23 1055)  Resp: 19 (12/04/23 1055)  BP: 133/64 (12/04/23 1055)  SpO2: 98 % (12/04/23 1055) Vital Signs (24h Range):  Temp:  [98.3 °F (36.8 °C)-98.7 °F (37.1 °C)] 98.5 °F (36.9 °C)  Pulse:  [55-67] 67  Resp:  [16-20] 19  SpO2:  [96 %-99 %] 98 %  BP: (107-140)/(53-64) 133/64     Weight: 82.7 kg (182 lb 5.1 oz)  Body mass index is 25.43 kg/m².    Estimated Creatinine Clearance: 30.5 mL/min (A) (based on SCr of 2.3 mg/dL (H)).     Physical Exam  Vitals reviewed.   HENT:      Head: Normocephalic and atraumatic.   Cardiovascular:      Rate and Rhythm: Normal rate and regular rhythm.      Heart sounds: Murmur heard.   Pulmonary:      Effort: Pulmonary effort is normal.      Breath sounds: Normal breath sounds.   Abdominal:      General: Abdomen is flat. There is no distension.      Palpations: " Abdomen is soft.      Tenderness: There is no abdominal tenderness.   Musculoskeletal:         General: Normal range of motion.   Skin:     Comments: Clean dressing on b/l feet  L leg and knee edematous. L knee warm. ROM intact.   Neurological:      General: No focal deficit present.      Mental Status: He is alert and oriented to person, place, and time.          Significant Labs: Blood Culture:   Recent Labs   Lab 09/21/23  1029 09/23/23  0929 09/23/23  0930 11/27/23  1512 11/27/23  1520   LABBLOO No Growth after 4 days.  No Growth after 4 days. No Growth after 4 days. No Growth after 4 days. Gram stain aer bottle: Gram positive cocci in clusters resembling Staph  Results called to and read back by:Kathleen everett 11/28/2023  13:53  COAGULASE-NEGATIVE STAPHYLOCOCCUS SPECIES  Organism is a probable contaminant  * No Growth after 4 days.     Wound Culture:   Recent Labs   Lab 11/28/23  1347 12/01/23  1123   LABAERO Results called to and read back by:CHARITY STAFFORD 11/30/2023  10:39  METHICILLIN RESISTANT STAPHYLOCOCCUS AUREUS  Many  * No growth  No growth  No growth  No growth     All pertinent labs within the past 24 hours have been reviewed.    Significant Imaging: I have reviewed all pertinent imaging results/findings within the past 24 hours.

## 2023-12-04 NOTE — PLAN OF CARE
Problem: Adult Inpatient Plan of Care  Goal: Plan of Care Review  Outcome: Ongoing, Progressing  Goal: Patient-Specific Goal (Individualized)  Outcome: Ongoing, Progressing  Goal: Optimal Comfort and Wellbeing  Outcome: Ongoing, Progressing  Goal: Readiness for Transition of Care  Outcome: Ongoing, Progressing     Problem: Skin Injury Risk Increased  Goal: Skin Health and Integrity  Outcome: Ongoing, Progressing     Problem: Diabetes Comorbidity  Goal: Blood Glucose Level Within Targeted Range  Outcome: Ongoing, Progressing     Problem: Bleeding (Sepsis/Septic Shock)  Goal: Absence of Bleeding  Outcome: Ongoing, Progressing

## 2023-12-04 NOTE — ASSESSMENT & PLAN NOTE
Patient's anemia is currently uncontrolled. Has not received any PRBCs to date. Etiology likely d/t Iron deficiency and chronic disease due to Chronic Kidney Disease/ESRD  Current CBC reviewed-   Lab Results   Component Value Date    HGB 8.0 (L) 12/04/2023    HCT 27.1 (L) 12/04/2023   Will obtain iron panel and ferritin  Monitor serial CBC and transfuse if patient becomes hemodynamically unstable, symptomatic or H/H drops below 7/21.

## 2023-12-04 NOTE — ASSESSMENT & PLAN NOTE
Patient's FSGs are controlled on current medication regimen.  Last A1c reviewed-   Lab Results   Component Value Date    HGBA1C 5.5 09/20/2023     Most recent fingerstick glucose reviewed-   Recent Labs   Lab 12/03/23  2147 12/04/23  0743 12/04/23  1056 12/04/23  1634   POCTGLUCOSE 110 85 144* 137*       Current correctional scale  Low  Low hemoglobin A1C likely due to worsening kidney functions  Antihyperglycemics (From admission, onward)    Start     Stop Route Frequency Ordered    11/28/23 0900  insulin detemir U-100 (Levemir) pen 8 Units         -- SubQ 2 times daily 11/28/23 0241    11/28/23 0715  insulin aspart U-100 pen 2 Units         -- SubQ 3 times daily with meals 11/28/23 0241    11/28/23 0334  insulin aspart U-100 pen 0-5 Units         -- SubQ Before meals & nightly PRN 11/28/23 0241        Hold Oral hypoglycemics while patient is in the hospital.  Will start basal,bolus and correctional insulin as needed

## 2023-12-04 NOTE — ASSESSMENT & PLAN NOTE
Chronic, uncontrolled. Latest blood pressure and vitals reviewed-     Temp:  [98.3 °F (36.8 °C)-98.6 °F (37 °C)]   Pulse:  [55-67]   Resp:  [16-20]   BP: (107-140)/(53-64)   SpO2:  [96 %-99 %] .   Home meds for hypertension were reviewed and noted below.   Hypertension Medications               furosemide (LASIX) 80 MG tablet Take 1 tablet (80 mg total) by mouth 2 (two) times daily.    metOLazone (ZAROXOLYN) 5 MG tablet Take 1 tablet (5 mg total) by mouth once daily.    metoprolol succinate (TOPROL-XL) 25 MG 24 hr tablet Take 25 mg by mouth once daily.            While in the hospital, will manage blood pressure as follows; Continue home antihypertensive regimen    Will utilize p.r.n. blood pressure medication only if patient's blood pressure greater than 180/110 and he develops symptoms such as worsening chest pain or shortness of breath.

## 2023-12-04 NOTE — ASSESSMENT & PLAN NOTE
History of multiple decubitus ulcers, previous buttock ulcers now healed  Has bilateral heel wound and lat rt foot  CRP 57.8,Sed rate 83,with procalcitonin elevated at 6.09  -ID, podiatry consulted.  Patient s/p I&D.  Continue to follow-up cultures and continue antibiotics per Infectious Disease recommendations.

## 2023-12-04 NOTE — ASSESSMENT & PLAN NOTE
"73M admitted 11/27 from NH for b/l wounds. 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 Wound swab of R ulcer with MRSA. Podiatry now following- s/p bone biopsy and debridement, cultures NGTD, path pending.  ID consulted for "heel osteo with mrsa infection "    Needs 6 weeks of abx for osteomyelitis of foot. Strong suspicion for polymicrobial infection (including mrsa) so would continue anti-psuedomonal and anaerobe coverage for now. Also with large left prepatellar effusion s/p dry tap- ortho following and suspect 2/2 arthritis.    Recommendations:   - continue vanc/cefepime/flagyl  - f/u pending path/cultures  - needs adequate perfusion to heal  - pressure offloading  "

## 2023-12-04 NOTE — PROGRESS NOTES
Pharmacokinetic Assessment Follow Up: IV Vancomycin    Vancomycin serum concentration assessment(s):    The random level was drawn correctly and can be used to guide therapy at this time. The measurement is within the desired definitive target range of 10 to 20 mcg/mL.    Vancomycin Regimen Plan:    Give 500 mg today.  Re-dose when the random level is less than 20 mcg/mL, next level to be drawn at 0400 on 12/5/2023    Drug levels (last 3 results):  Recent Labs   Lab Result Units 12/01/23  1457 12/02/23  0602 12/03/23  0508 12/04/23  0446   Vancomycin, Random ug/mL  --  22.9 19.7 16.6   Vancomycin-Trough ug/mL 24.8*  --   --   --        Pharmacy will continue to follow and monitor vancomycin.    Please contact pharmacy at extension [phone] for questions regarding this assessment.    Thank you for the consult,   Ryan Walker Jr       Patient brief summary:  Chato Bowie is a 73 y.o. male initiated on antimicrobial therapy with IV Vancomycin for treatment of skin & soft tissue infection    Drug Allergies:   Review of patient's allergies indicates:  No Known Allergies    Actual Body Weight:   82.7 kg    Renal Function:   Estimated Creatinine Clearance: 30.5 mL/min (A) (based on SCr of 2.3 mg/dL (H)).,     Dialysis Method (if applicable):  N/A    CBC (last 72 hours):  Recent Labs   Lab Result Units 12/02/23  1701 12/03/23  0508 12/04/23  0446   WBC K/uL 7.40 7.20 7.22   Hemoglobin g/dL 8.4* 8.1* 8.0*   Hematocrit % 27.8* 27.5* 27.1*   Platelets K/uL 268 275 265   Gran % % 61.0 62.7 62.7   Lymph % % 15.4* 14.9* 14.5*   Mono % % 16.2* 15.0 15.7*   Eosinophil % % 6.1 5.7 5.5   Basophil % % 0.9 1.3 1.2   Differential Method  Automated Automated Automated       Metabolic Panel (last 72 hours):  Recent Labs   Lab Result Units 12/01/23  0815 12/02/23  1701 12/03/23  0508 12/04/23  0446   Sodium mmol/L 134* 135* 136 136   Potassium mmol/L 3.1* 3.0* 2.9* 3.2*   Chloride mmol/L 90* 90* 91* 91*   CO2 mmol/L 31* 29 30* 28    Glucose mg/dL 88 116* 81 87   BUN mg/dL 48* 54* 55* 55*   Creatinine mg/dL 2.1* 2.2* 2.2* 2.3*   Magnesium mg/dL  --   --  1.9 2.0       Vancomycin Administrations:  vancomycin given in the last 96 hours                     vancomycin 1,250 mg in dextrose 5 % (D5W) 250 mL IVPB (Vial-Mate) (mg) 1,250 mg New Bag 12/01/23 1644     1,250 mg New Bag 11/30/23 1535    vancomycin injection (g) 1 g Given 12/01/23 1043     1 g Given  1042                    Microbiologic Results:  Microbiology Results (last 7 days)       Procedure Component Value Units Date/Time    Aerobic culture [[id number]] Collected: 12/01/23 1123    Order Status: Completed Specimen: Bone from Foot, Left Updated: 12/04/23 0615     Aerobic Bacterial Culture No growth    Narrative:      Right 5th metatarsal    Aerobic culture [[id number]] Collected: 12/01/23 1123    Order Status: Completed Specimen: Bone from Foot, Left Updated: 12/04/23 0554     Aerobic Bacterial Culture No growth    Narrative:      Right heel    Aerobic culture [[id number]] Collected: 12/01/23 1123    Order Status: Completed Specimen: Bone from Foot, Left Updated: 12/04/23 0549     Aerobic Bacterial Culture No growth    Narrative:      Left 5th metatarsal    Aerobic culture [[id number]] Collected: 12/01/23 1123    Order Status: Completed Specimen: Bone from Foot, Left Updated: 12/04/23 0546     Aerobic Bacterial Culture No growth    Narrative:      Left heel    Culture, Anaerobe [[id number]] Collected: 12/01/23 1123    Order Status: Completed Specimen: Bone from Foot, Left Updated: 12/03/23 0807     Anaerobic Culture Culture in progress    Narrative:      Right 5th metatarsal    Culture, Anaerobe [[id number]] Collected: 12/01/23 1123    Order Status: Completed Specimen: Bone from Foot, Left Updated: 12/03/23 0807     Anaerobic Culture Culture in progress    Narrative:      Right heel    Culture, Anaerobe [[id number]] Collected: 12/01/23 1123    Order Status: Completed Specimen: Bone  from Foot, Left Updated: 12/03/23 0806     Anaerobic Culture Culture in progress    Narrative:      Left 5th metatarsal    Culture, Anaerobe [[id number]] Collected: 12/01/23 1123    Order Status: Completed Specimen: Bone from Foot, Left Updated: 12/03/23 0806     Anaerobic Culture Culture in progress    Narrative:      Left heel    AFB Culture & Smear [[id number]] Collected: 12/01/23 1123    Order Status: Completed Specimen: Bone from Foot, Right Updated: 12/02/23 2127     AFB Culture & Smear Culture in progress    Narrative:      Right heel    AFB Culture & Smear [[id number]] Collected: 12/01/23 1123    Order Status: Completed Specimen: Bone from Foot, Right Updated: 12/02/23 2127     AFB Culture & Smear Culture in progress    Narrative:      Right 5th metatarsal    AFB Culture & Smear [[id number]] Collected: 12/01/23 1123    Order Status: Completed Specimen: Bone from Foot, Left Updated: 12/02/23 2127     AFB Culture & Smear Culture in progress    Narrative:      Left heel    AFB Culture & Smear [[id number]] Collected: 12/01/23 1123    Order Status: Completed Specimen: Bone from Foot, Left Updated: 12/02/23 2127     AFB Culture & Smear Culture in progress    Narrative:      Left 5th metatarsal    Culture, Anaerobe [[id number]] Collected: 11/28/23 1347    Order Status: Completed Specimen: Wound from Foot, Right Updated: 12/02/23 0855     Anaerobic Culture No anaerobes isolated    Blood culture #1 [[id number]] Collected: 11/27/23 1520    Order Status: Completed Specimen: Blood from Peripheral, Antecubital, Left Updated: 12/01/23 1703     Blood Culture, Routine No Growth after 4 days.    Narrative:      Blood Culture #1    Gram stain [[id number]] Collected: 12/01/23 1123    Order Status: Completed Specimen: Bone from Foot, Left Updated: 12/01/23 1429     Gram Stain Result Few WBC's      No organisms seen    Narrative:      Right 5th metatarsal    Gram stain [[id number]] Collected: 12/01/23 1123    Order Status:  Completed Specimen: Bone from Foot, Left Updated: 12/01/23 1428     Gram Stain Result Rare WBC's      No organisms seen    Narrative:      Right heel    Gram stain [[id number]] Collected: 12/01/23 1123    Order Status: Completed Specimen: Bone from Foot, Left Updated: 12/01/23 1428     Gram Stain Result Rare WBC's      No organisms seen    Narrative:      Left 5th metatarsal    Gram stain [[id number]] Collected: 12/01/23 1123    Order Status: Completed Specimen: Bone from Foot, Left Updated: 12/01/23 1427     Gram Stain Result Rare WBC's      No organisms seen    Narrative:      Left heel    Fungus culture [[id number]] Collected: 12/01/23 1123    Order Status: No result Specimen: Bone from Foot, Left Updated: 12/01/23 1220    Fungus culture [[id number]] Collected: 12/01/23 1123    Order Status: Sent Specimen: Bone from Foot, Left Updated: 12/01/23 1216    Fungus culture [[id number]] Collected: 12/01/23 1123    Order Status: Sent Specimen: Bone from Foot, Left Updated: 12/01/23 1213    Fungus culture [[id number]] Collected: 12/01/23 1123    Order Status: Sent Specimen: Bone from Foot, Left Updated: 12/01/23 1207    Fungus culture [[id number]]     Order Status: No result Specimen: Bone from Foot, Left     Blood culture #2 [[id number]]  (Abnormal) Collected: 11/27/23 1512    Order Status: Completed Specimen: Blood from Peripheral, Antecubital, Left Updated: 12/01/23 0537     Blood Culture, Routine Gram stain aer bottle: Gram positive cocci in clusters resembling Staph      Results called to and read back by:Kathleen Young east 11/28/2023      13:53      COAGULASE-NEGATIVE STAPHYLOCOCCUS SPECIES  Organism is a probable contaminant      Narrative:      Blood Culture #2    Aerobic culture [[id number]]  (Abnormal)  (Susceptibility) Collected: 11/28/23 1347    Order Status: Completed Specimen: Wound from Foot, Right Updated: 11/30/23 1041     Aerobic Bacterial Culture Results called to and read back by:CHARITY CHAVEZ  RIVERA 11/30/2023  10:39      METHICILLIN RESISTANT STAPHYLOCOCCUS AUREUS  Many      Urine culture [[id number]]  (Abnormal)  (Susceptibility) Collected: 11/27/23 1855    Order Status: Completed Specimen: Urine Updated: 11/29/23 0812     Urine Culture, Routine PROTEUS MIRABILIS  10,000 - 49,999 cfu/ml      Narrative:      Specimen Source->Urine    Gram stain [[id number]] Collected: 11/28/23 1347    Order Status: Completed Specimen: Wound from Foot, Right Updated: 11/28/23 1502     Gram Stain Result Few WBC's      Many Gram positive cocci in pairs

## 2023-12-04 NOTE — PLAN OF CARE
Problem: Adult Inpatient Plan of Care  Goal: Plan of Care Review  Flowsheets (Taken 12/4/2023 1736)  Plan of Care Reviewed With: patient  Goal: Absence of Hospital-Acquired Illness or Injury  Intervention: Prevent and Manage VTE (Venous Thromboembolism) Risk  Flowsheets (Taken 12/4/2023 1736)  VTE Prevention/Management:   bleeding precations maintained   bleeding risk assessed     Problem: Diabetes Comorbidity  Goal: Blood Glucose Level Within Targeted Range  Intervention: Monitor and Manage Glycemia  Flowsheets (Taken 12/4/2023 1736)  Glycemic Management: blood glucose monitored     Problem: Impaired Wound Healing  Goal: Optimal Wound Healing  Intervention: Promote Wound Healing  Flowsheets (Taken 12/4/2023 1736)  Oral Nutrition Promotion:   rest periods promoted   social interaction promoted     Problem: Infection  Goal: Absence of Infection Signs and Symptoms  Intervention: Prevent or Manage Infection  Flowsheets (Taken 12/4/2023 1736)  Infection Management: aseptic technique maintained

## 2023-12-04 NOTE — ASSESSMENT & PLAN NOTE
"73M admitted 11/27 from NH for b/l wounds. 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 Wound swab of R ulcer with MRSA. Podiatry now following- s/p bone biopsy and debridement, cultures NGTD, path pending.  ID consulted for "heel osteo with mrsa infection "    Needs 6 weeks of abx for osteomyelitis of foot. Strong suspicion for polymicrobial infection (including mrsa) so would continue anti-psuedomonal and anaerobe coverage for now.    Recommendations:   - continue vanc/cefepime/flagyl  - f/u pending path/cultures  - needs adequate perfusion to heal  - pressure offloading  "

## 2023-12-04 NOTE — NURSING
[facility]  Nurses Note -- 4 Eyes    12-3-23      Skin assessed on: Q Shift      [] No Pressure Injuries Present    []Prevention Measures Documented    [x] Yes LDA  for Pressure Injury Previously documented     [] Yes New Pressure Injury Discovered   [] LDA for New Pressure Injury Added      Attending RN:  Suze Srivastava RN     Second RN:  Samantha skinner RN

## 2023-12-05 LAB
ANION GAP SERPL CALC-SCNC: 14 MMOL/L (ref 8–16)
BACTERIA SPEC AEROBE CULT: NO GROWTH
BACTERIA SPEC ANAEROBE CULT: NORMAL
BASOPHILS # BLD AUTO: 0.11 K/UL (ref 0–0.2)
BASOPHILS NFR BLD: 1.3 % (ref 0–1.9)
BUN SERPL-MCNC: 52 MG/DL (ref 8–23)
CALCIUM SERPL-MCNC: 10 MG/DL (ref 8.7–10.5)
CHLORIDE SERPL-SCNC: 94 MMOL/L (ref 95–110)
CO2 SERPL-SCNC: 27 MMOL/L (ref 23–29)
CREAT SERPL-MCNC: 2.3 MG/DL (ref 0.5–1.4)
DIFFERENTIAL METHOD: ABNORMAL
EOSINOPHIL # BLD AUTO: 0.4 K/UL (ref 0–0.5)
EOSINOPHIL NFR BLD: 4.9 % (ref 0–8)
ERYTHROCYTE [DISTWIDTH] IN BLOOD BY AUTOMATED COUNT: 16 % (ref 11.5–14.5)
EST. GFR  (NO RACE VARIABLE): 29 ML/MIN/1.73 M^2
GLUCOSE SERPL-MCNC: 62 MG/DL (ref 70–110)
HCT VFR BLD AUTO: 30.2 % (ref 40–54)
HGB BLD-MCNC: 8.9 G/DL (ref 14–18)
IMM GRANULOCYTES # BLD AUTO: 0.06 K/UL (ref 0–0.04)
IMM GRANULOCYTES NFR BLD AUTO: 0.7 % (ref 0–0.5)
LYMPHOCYTES # BLD AUTO: 1.3 K/UL (ref 1–4.8)
LYMPHOCYTES NFR BLD: 15.4 % (ref 18–48)
MAGNESIUM SERPL-MCNC: 2 MG/DL (ref 1.6–2.6)
MCH RBC QN AUTO: 22.8 PG (ref 27–31)
MCHC RBC AUTO-ENTMCNC: 29.5 G/DL (ref 32–36)
MCV RBC AUTO: 77 FL (ref 82–98)
MONOCYTES # BLD AUTO: 1.4 K/UL (ref 0.3–1)
MONOCYTES NFR BLD: 15.9 % (ref 4–15)
NEUTROPHILS # BLD AUTO: 5.3 K/UL (ref 1.8–7.7)
NEUTROPHILS NFR BLD: 61.8 % (ref 38–73)
NRBC BLD-RTO: 0 /100 WBC
PLATELET # BLD AUTO: 285 K/UL (ref 150–450)
PMV BLD AUTO: 8.6 FL (ref 9.2–12.9)
POCT GLUCOSE: 127 MG/DL (ref 70–110)
POCT GLUCOSE: 72 MG/DL (ref 70–110)
POCT GLUCOSE: 78 MG/DL (ref 70–110)
POCT GLUCOSE: 83 MG/DL (ref 70–110)
POTASSIUM SERPL-SCNC: 3.6 MMOL/L (ref 3.5–5.1)
RBC # BLD AUTO: 3.9 M/UL (ref 4.6–6.2)
SODIUM SERPL-SCNC: 135 MMOL/L (ref 136–145)
VANCOMYCIN SERPL-MCNC: 17.3 UG/ML
WBC # BLD AUTO: 8.5 K/UL (ref 3.9–12.7)

## 2023-12-05 PROCEDURE — 85025 COMPLETE CBC W/AUTO DIFF WBC: CPT | Performed by: STUDENT IN AN ORGANIZED HEALTH CARE EDUCATION/TRAINING PROGRAM

## 2023-12-05 PROCEDURE — 99233 SBSQ HOSP IP/OBS HIGH 50: CPT | Mod: ,,, | Performed by: SURGERY

## 2023-12-05 PROCEDURE — 11000001 HC ACUTE MED/SURG PRIVATE ROOM

## 2023-12-05 PROCEDURE — 25000003 PHARM REV CODE 250: Performed by: STUDENT IN AN ORGANIZED HEALTH CARE EDUCATION/TRAINING PROGRAM

## 2023-12-05 PROCEDURE — 63600175 PHARM REV CODE 636 W HCPCS: Performed by: STUDENT IN AN ORGANIZED HEALTH CARE EDUCATION/TRAINING PROGRAM

## 2023-12-05 PROCEDURE — 80048 BASIC METABOLIC PNL TOTAL CA: CPT | Performed by: STUDENT IN AN ORGANIZED HEALTH CARE EDUCATION/TRAINING PROGRAM

## 2023-12-05 PROCEDURE — 83735 ASSAY OF MAGNESIUM: CPT | Performed by: STUDENT IN AN ORGANIZED HEALTH CARE EDUCATION/TRAINING PROGRAM

## 2023-12-05 PROCEDURE — 25000003 PHARM REV CODE 250: Performed by: HOSPITALIST

## 2023-12-05 PROCEDURE — 99233 SBSQ HOSP IP/OBS HIGH 50: CPT | Mod: ,,, | Performed by: INTERNAL MEDICINE

## 2023-12-05 PROCEDURE — 63600175 PHARM REV CODE 636 W HCPCS: Performed by: HOSPITALIST

## 2023-12-05 PROCEDURE — 80202 ASSAY OF VANCOMYCIN: CPT | Performed by: STUDENT IN AN ORGANIZED HEALTH CARE EDUCATION/TRAINING PROGRAM

## 2023-12-05 PROCEDURE — 25000003 PHARM REV CODE 250: Performed by: SURGERY

## 2023-12-05 PROCEDURE — 99233 PR SUBSEQUENT HOSPITAL CARE,LEVL III: ICD-10-PCS | Mod: ,,, | Performed by: SURGERY

## 2023-12-05 PROCEDURE — 36415 COLL VENOUS BLD VENIPUNCTURE: CPT | Performed by: STUDENT IN AN ORGANIZED HEALTH CARE EDUCATION/TRAINING PROGRAM

## 2023-12-05 PROCEDURE — 27000207 HC ISOLATION

## 2023-12-05 PROCEDURE — 99233 PR SUBSEQUENT HOSPITAL CARE,LEVL III: ICD-10-PCS | Mod: ,,, | Performed by: INTERNAL MEDICINE

## 2023-12-05 RX ORDER — ADHESIVE BANDAGE
30 BANDAGE TOPICAL ONCE
Status: COMPLETED | OUTPATIENT
Start: 2023-12-05 | End: 2023-12-05

## 2023-12-05 RX ORDER — LACTULOSE 10 G/15ML
20 SOLUTION ORAL 3 TIMES DAILY
Status: DISPENSED | OUTPATIENT
Start: 2023-12-05 | End: 2023-12-06

## 2023-12-05 RX ORDER — AMOXICILLIN 250 MG
1 CAPSULE ORAL 2 TIMES DAILY
Status: DISCONTINUED | OUTPATIENT
Start: 2023-12-05 | End: 2023-12-08 | Stop reason: HOSPADM

## 2023-12-05 RX ORDER — POLYETHYLENE GLYCOL 3350 17 G/17G
17 POWDER, FOR SOLUTION ORAL DAILY
Status: DISCONTINUED | OUTPATIENT
Start: 2023-12-05 | End: 2023-12-08 | Stop reason: HOSPADM

## 2023-12-05 RX ADMIN — FUROSEMIDE 40 MG: 40 TABLET ORAL at 08:12

## 2023-12-05 RX ADMIN — INSULIN ASPART 2 UNITS: 100 INJECTION, SOLUTION INTRAVENOUS; SUBCUTANEOUS at 12:12

## 2023-12-05 RX ADMIN — VANCOMYCIN HYDROCHLORIDE 500 MG: 500 INJECTION, POWDER, LYOPHILIZED, FOR SOLUTION INTRAVENOUS at 09:12

## 2023-12-05 RX ADMIN — INSULIN DETEMIR 8 UNITS: 100 INJECTION, SOLUTION SUBCUTANEOUS at 08:12

## 2023-12-05 RX ADMIN — MUPIROCIN: 20 OINTMENT TOPICAL at 08:12

## 2023-12-05 RX ADMIN — FERROUS SULFATE TAB 325 MG (65 MG ELEMENTAL FE) 1 EACH: 325 (65 FE) TAB at 08:12

## 2023-12-05 RX ADMIN — TAMSULOSIN HYDROCHLORIDE 0.4 MG: 0.4 CAPSULE ORAL at 08:12

## 2023-12-05 RX ADMIN — LACTULOSE 20 G: 20 SOLUTION ORAL at 08:12

## 2023-12-05 RX ADMIN — INSULIN ASPART 2 UNITS: 100 INJECTION, SOLUTION INTRAVENOUS; SUBCUTANEOUS at 04:12

## 2023-12-05 RX ADMIN — METRONIDAZOLE 500 MG: 500 INJECTION, SOLUTION INTRAVENOUS at 04:12

## 2023-12-05 RX ADMIN — CEFEPIME 2 G: 2 INJECTION, POWDER, FOR SOLUTION INTRAVENOUS at 07:12

## 2023-12-05 RX ADMIN — SENNOSIDES AND DOCUSATE SODIUM 1 TABLET: 8.6; 5 TABLET ORAL at 08:12

## 2023-12-05 RX ADMIN — ATORVASTATIN CALCIUM 40 MG: 40 TABLET, FILM COATED ORAL at 08:12

## 2023-12-05 RX ADMIN — ASPIRIN 81 MG CHEWABLE TABLET 81 MG: 81 TABLET CHEWABLE at 08:12

## 2023-12-05 RX ADMIN — METRONIDAZOLE 500 MG: 500 INJECTION, SOLUTION INTRAVENOUS at 07:12

## 2023-12-05 RX ADMIN — AMIODARONE HYDROCHLORIDE 200 MG: 200 TABLET ORAL at 08:12

## 2023-12-05 RX ADMIN — METOLAZONE 5 MG: 5 TABLET ORAL at 08:12

## 2023-12-05 RX ADMIN — POLYETHYLENE GLYCOL 3350 17 G: 17 POWDER, FOR SOLUTION ORAL at 02:12

## 2023-12-05 RX ADMIN — FOLIC ACID 1 MG: 1 TABLET ORAL at 08:12

## 2023-12-05 RX ADMIN — INSULIN ASPART 2 UNITS: 100 INJECTION, SOLUTION INTRAVENOUS; SUBCUTANEOUS at 08:12

## 2023-12-05 RX ADMIN — MAGNESIUM HYDROXIDE 2400 MG: 400 SUSPENSION ORAL at 03:12

## 2023-12-05 RX ADMIN — LACTULOSE 20 G: 20 SOLUTION ORAL at 02:12

## 2023-12-05 RX ADMIN — METRONIDAZOLE 500 MG: 500 INJECTION, SOLUTION INTRAVENOUS at 12:12

## 2023-12-05 NOTE — PROGRESS NOTES
[place] [facility]  Vascular Surgery  Progress Note    Patient Name: Chato Bowie  MRN: [id number]  Admission Date: 11/27/2023  Primary Care Provider: Irlanda Valenzuela -    Subjective:     Interval History:  No new complaints.  Left foot wounds remained non healing, reported necrotic appearance  Post-Op Info:  Procedure(s) (LRB):  IRRIGATION AND DEBRIDEMENT (Bilateral)   4 Days Post-Op      Medications:  Continuous Infusions:  Scheduled Meds:   amiodarone  200 mg Oral Daily    aspirin  81 mg Oral Daily    atorvastatin  40 mg Oral QHS    ferrous sulfate  1 tablet Oral BID    folic acid  1 mg Oral Daily    furosemide  40 mg Oral Daily    insulin aspart U-100  2 Units Subcutaneous TIDWM    insulin detemir U-100  8 Units Subcutaneous BID    lactulose  20 g Oral TID    metOLazone  5 mg Oral Daily    metoprolol succinate  12.5 mg Oral Daily    metronidazole  500 mg Intravenous Q8H    mupirocin   Nasal BID    polyethylene glycol  17 g Oral Daily    senna-docusate 8.6-50 mg  1 tablet Oral BID    tamsulosin  0.4 mg Oral Daily     PRN Meds:acetaminophen, acetaminophen, cadexomer iodine, dextrose 10%, dextrose 10%, glucagon (human recombinant), glucose, glucose, HYDROcodone-acetaminophen, HYDROmorphone, insulin aspart U-100, melatonin, naloxone, ondansetron, ondansetron, oxyCODONE-acetaminophen, potassium bicarbonate, potassium bicarbonate, simethicone, sodium chloride 0.9%, Pharmacy to dose Vancomycin consult **AND** vancomycin - pharmacy to dose     Objective:     Vital Signs (Most Recent):  Temp: 97.4 °F (36.3 °C) (12/05/23 1608)  Pulse: 62 (12/05/23 1608)  Resp: 18 (12/05/23 1608)  BP: (!) 152/70 (12/05/23 1608)  SpO2: 99 % (12/05/23 1608) Vital Signs (24h Range):  Temp:  [97.3 °F (36.3 °C)-98.3 °F (36.8 °C)] 97.4 °F (36.3 °C)  Pulse:  [57-65] 62  Resp:  [18-20] 18  SpO2:  [98 %-100 %] 99 %  BP: (126-160)/(62-79) 152/70     Date 12/05/23 0700 - 12/06/23 0659   Shift [id number] 7242-0604 6895-8753 24 Hour Total   INTAKE  "  P.O. 480   480   IV Piggyback 421.5   421.5   Shift Total(mL/kg) 901.5(10.9)   901.5(10.9)   OUTPUT   Shift Total(mL/kg)       Weight (kg) 82.7 82.7 82.7 82.7         Physical Exam  Bilateral DP signals, biphasic on right monophasic on the left  Significant Labs:  eGFR: No results for input(s): "EGFRIFAFRICA", "EGFRCYSTC", "LABGLOM" in the last 48 hours.    Invalid input(s): "EGFRNON-AA"  Hemoglobin: No results for input(s): "HGBA1C" in the last 48 hours.  Lactic Acid: No results for input(s): "LACTATE" in the last 48 hours.  LFTs: No results for input(s): "ALT", "AST", "ALKPHOS", "BILITOT", "PROT", "ALBUMIN" in the last 48 hours.    Significant Diagnostics:  I have reviewed all pertinent imaging results/findings within the past 24 hours.    Assessment/Plan:     Active Diagnoses:    Diagnosis Date Noted POA    PRINCIPAL PROBLEM:  Osteomyelitis of foot [M86.9] 11/30/2023 Yes    Chronic diastolic heart failure [I50.32] 11/28/2023 Yes    Atherosclerosis of native artery of extremity [I70.209] 11/28/2023 Yes    Multiple wounds [T07.XXXA] 07/28/2023 Yes     Chronic    UTI (urinary tract infection) [N39.0] 09/05/2021 Yes    Paroxysmal atrial flutter [I48.92] 12/07/2020 Yes     Chronic    BPH (benign prostatic hyperplasia) [N40.0] 11/24/2020 Yes    Stage 3b chronic kidney disease (CKD) [N18.32] 11/24/2020 Yes    Left knee pain [M25.562] 03/23/2020 Yes    Alcohol abuse [F10.10] 02/14/2019 Yes    Type 2 diabetes mellitus with kidney complication, with long-term current use of insulin [E11.29, Z79.4] 03/10/2017 Not Applicable     Chronic    GERD (gastroesophageal reflux disease) [K21.9] 03/10/2017 Yes     Chronic    Essential hypertension [I10] 03/10/2017 Yes    Anemia [D64.9] 03/10/2017 Yes    Dyslipidemia [E78.5] 03/10/2017 Yes      Problems Resolved During this Admission:    Diagnosis Date Noted Date Resolved POA    DJD (degenerative joint disease) [M19.90] 06/14/2023 11/30/2023 Yes     73-year-old man with multiple " comorbidities occluding CHF CKD3 and chronic limb-threatening ischemia with bilateral foot wounds nonhealing of BLE heels s/p  BLE heel I&D w podiatry, awaiting culture results .  Given the ongoing Clinical concerns for poor perfusion to L heel.  Risks and benefits of angiogram with intervention was reconsidered and revisited w patient.  Role for BKA also discussed with patient who adamately wanted to do whatever possible to have a chance a limb salvage.    Plan to proceed with LLE angiography , tomorrow afternoon, under moderate sedation  (Mallampati 3 ASA 3)  NPO midnight, IV fluids  Hold DOAC in anticipation of proceedure  Appreciate care per Podiatry, ID, and primary    Osei Yeboah MD  Vascular Surgery  [facility]

## 2023-12-05 NOTE — SUBJECTIVE & OBJECTIVE
Interval History: No acute events overnight. No new complaints.     Review of Systems   Constitutional:  Negative for chills and fatigue.   Respiratory:  Negative for cough and shortness of breath.    Skin:  Positive for wound.   All other systems reviewed and are negative.    Objective:     Vital Signs (Most Recent):  Temp: 97.6 °F (36.4 °C) (12/05/23 1137)  Pulse: (!) 57 (12/05/23 1137)  Resp: 19 (12/05/23 1137)  BP: (!) 151/69 (12/05/23 1137)  SpO2: 98 % (12/05/23 1137) Vital Signs (24h Range):  Temp:  [97.3 °F (36.3 °C)-98.3 °F (36.8 °C)] 97.6 °F (36.4 °C)  Pulse:  [57-65] 57  Resp:  [18-20] 19  SpO2:  [98 %-100 %] 98 %  BP: (126-160)/(62-79) 151/69     Weight: 82.7 kg (182 lb 5.1 oz)  Body mass index is 25.43 kg/m².    Estimated Creatinine Clearance: 30.5 mL/min (A) (based on SCr of 2.3 mg/dL (H)).     Physical Exam  Vitals reviewed.   HENT:      Head: Normocephalic and atraumatic.   Cardiovascular:      Rate and Rhythm: Normal rate and regular rhythm.      Heart sounds: Murmur heard.   Pulmonary:      Effort: Pulmonary effort is normal.      Breath sounds: Normal breath sounds.   Abdominal:      General: Abdomen is flat. There is no distension.      Palpations: Abdomen is soft.      Tenderness: There is no abdominal tenderness.   Musculoskeletal:         General: Normal range of motion.   Skin:     Comments: Clean dressing on b/l feet  L leg and knee edematous. L knee warm. ROM intact.   Neurological:      General: No focal deficit present.      Mental Status: He is alert and oriented to person, place, and time.          Significant Labs: Blood Culture:   Recent Labs   Lab 09/21/23  1029 09/23/23  0929 09/23/23  0930 11/27/23  1512 11/27/23  1520   LABBLOO No Growth after 4 days.  No Growth after 4 days. No Growth after 4 days. No Growth after 4 days. Gram stain aer bottle: Gram positive cocci in clusters resembling Staph  Results called to and read back by:Kathleen Gray-Geetha east 11/28/2023  13:53   COAGULASE-NEGATIVE STAPHYLOCOCCUS SPECIES  Organism is a probable contaminant  * No Growth after 4 days.       Wound Culture:   Recent Labs   Lab 11/28/23  1347 12/01/23  1123   LABAERO Results called to and read back by:CHARITY STAFFORD 11/30/2023  10:39  METHICILLIN RESISTANT STAPHYLOCOCCUS AUREUS  Many  * STAPHYLOCOCCUS AUREUS  From broth only  *  STAPHYLOCOCCUS AUREUS  From broth only  *  STAPHYLOCOCCUS AUREUS  From broth only  Susceptibility pending  *  No growth       All pertinent labs within the past 24 hours have been reviewed.    Significant Imaging: I have reviewed all pertinent imaging results/findings within the past 24 hours.

## 2023-12-05 NOTE — ASSESSMENT & PLAN NOTE
"73M admitted 11/27 from NH for b/l wounds. 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 Wound swab of R ulcer with MRSA. s/p bone biopsy and debridement, cultures + s aureus, path pending.  ID consulted for "heel osteo with mrsa infection "    Needs 6 weeks of abx for osteomyelitis of foot.  Also with large left prepatellar effusion s/p dry tap- ortho following and suspect 2/2 arthritis.    Recommendations:   - STOP cefepime  - continue vanc . Pharm to dose.   - continue flagyl pending final anaerobic cx.   - f/u susceptibilities of s aureus   - f/u pending path/cultures  - needs adequate perfusion to heal  - pressure offloading  "

## 2023-12-05 NOTE — NURSING
Received report care assumed. Patient lying in bed resting, NAD noted. Safety precautions maintained.    [facility]  Nurses Note -- 4 Eyes      12/4/2023       Skin assessed on: Q Shift      [] No Pressure Injuries Present    []Prevention Measures Documented    [x] Yes LDA  for Pressure Injury Previously documented     [] Yes New Pressure Injury Discovered   [] LDA for New Pressure Injury Added      Attending RN:  Ethel Hernandez LPN     Second RN:  Mickie Villalobos RN

## 2023-12-05 NOTE — PLAN OF CARE
RE-ASSESSMENT  Leigh Evans (Relative)   [phone] (Work Phone)    Return to [facility]       12/04/23 6083   Discharge Reassessment   Assessment Type Discharge Planning Reassessment   Did the patient's condition or plan change since previous assessment? Yes   Discharge Plan discussed with: Adult children   Communicated ELY with patient/caregiver Date not available/Unable to determine   Discharge Plan A Return to nursing home   Discharge Plan B Return to Nursing Home   DME Needed Upon Discharge  none   Transition of Care Barriers None   Why the patient remains in the hospital Requires continued medical care   Post-Acute Status   Post-Acute Authorization Placement   Coverage [facility]   Discharge Delays (!) Procedure Scheduling (IR, OR, Labs, Echo, Cath, Echo, EEG)

## 2023-12-05 NOTE — PROGRESS NOTES
"[facility]  Infectious Disease  Progress Note    Patient Name: Chato Bowie  MRN: [id number]  Admission Date: 11/27/2023  Length of Stay: 8 days  Attending Physician: Nhung Martínez MD  Primary Care Provider: Irlanda Valenzuela -    Isolation Status: Contact  Assessment/Plan:      ID  * Osteomyelitis of foot  73M admitted 11/27 from NH for b/l wounds. 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 Wound swab of R ulcer with MRSA. s/p bone biopsy and debridement, cultures + s aureus, path pending.  ID consulted for "heel osteo with mrsa infection "    Needs 6 weeks of abx for osteomyelitis of foot.  Also with large left prepatellar effusion s/p dry tap- ortho following and suspect 2/2 arthritis.    Recommendations:   - STOP cefepime  - continue vanc . Pharm to dose.   - continue flagyl pending final anaerobic cx.   - anticipate 6 wks IV abx. Will need line placement.   - f/u susceptibilities of s aureus   - f/u pending path/cultures  - needs adequate perfusion to heal  - pressure offloading        Anticipated Disposition: tbd    Thank you for your consult. I will follow-up with patient. Please contact us if you have any additional questions.      Susanne Dumont MD  Infectious Disease  [facility] Surg    Subjective:     Principal Problem:Osteomyelitis of foot    HPI: 73M admitted 11/27 from NH for wounds. Pt reports his L knee has been aching for the last 3-4 months. Also reports b/l foot wounds and some recent swelling of leg and "I barely can walk". Denies f/c. Denies abx allergies. Denies indwelling hardware. There was plan for angio today, but cancelled because intervention not thought to be beneficial at this time.     Podiatry now following- Plan for OR debridement, bone biopsy tomorrow.       MRI   1. Osteomyelitis of the bilateral posterolateral calcaneus.  2. Osteomyelitis of the left 5th metatarsal head and possibly right 5th metatarsal head.      R foot wound cultures-        " "  Aerobic culture [[id number]] (Abnormal)  Collected: 11/28/23 6867   Order Status: Completed Specimen: Wound from Foot, Right Updated: 11/30/23 1041    Aerobic Bacterial Culture Results called to and read back by:CHARITY STAFFORD 11/30/2023  10:39     METHICILLIN RESISTANT STAPHYLOCOCCUS AUREUS  Many   Abnormal    Susceptibility     Methicillin resistant Staphylococcus aureus     CULTURE, AEROBIC  (SPECIFY SOURCE)     Clindamycin <=0.5 mcg/mL Sensitive     Erythromycin >4 mcg/mL Resistant     Oxacillin >2 mcg/mL Resistant     Penicillin >8 mcg/mL Resistant     Tetracycline <=4 mcg/mL Sensitive     Trimeth/Sulfa <=0.5/9.5 m... Sensitive     Vancomycin 2 mcg/mL Sensitive                   On vanc/ceftriaxone     ID consulted for "heel osteo with mrsa infection "  Interval History: No acute events overnight. No new complaints.     Review of Systems   Constitutional:  Negative for chills and fatigue.   Respiratory:  Negative for cough and shortness of breath.    Skin:  Positive for wound.   All other systems reviewed and are negative.    Objective:     Vital Signs (Most Recent):  Temp: 97.6 °F (36.4 °C) (12/05/23 1137)  Pulse: (!) 57 (12/05/23 1137)  Resp: 19 (12/05/23 1137)  BP: (!) 151/69 (12/05/23 1137)  SpO2: 98 % (12/05/23 1137) Vital Signs (24h Range):  Temp:  [97.3 °F (36.3 °C)-98.3 °F (36.8 °C)] 97.6 °F (36.4 °C)  Pulse:  [57-65] 57  Resp:  [18-20] 19  SpO2:  [98 %-100 %] 98 %  BP: (126-160)/(62-79) 151/69     Weight: 82.7 kg (182 lb 5.1 oz)  Body mass index is 25.43 kg/m².    Estimated Creatinine Clearance: 30.5 mL/min (A) (based on SCr of 2.3 mg/dL (H)).     Physical Exam  Vitals reviewed.   HENT:      Head: Normocephalic and atraumatic.   Cardiovascular:      Rate and Rhythm: Normal rate and regular rhythm.      Heart sounds: Murmur heard.   Pulmonary:      Effort: Pulmonary effort is normal.      Breath sounds: Normal breath sounds.   Abdominal:      General: Abdomen is flat. There is no distension.      " Palpations: Abdomen is soft.      Tenderness: There is no abdominal tenderness.   Musculoskeletal:         General: Normal range of motion.   Skin:     Comments: Clean dressing on b/l feet  L leg and knee edematous. L knee warm. ROM intact.   Neurological:      General: No focal deficit present.      Mental Status: He is alert and oriented to person, place, and time.          Significant Labs: Blood Culture:   Recent Labs   Lab 09/21/23  1029 09/23/23  0929 09/23/23  0930 11/27/23  1512 11/27/23  1520   LABBLOO No Growth after 4 days.  No Growth after 4 days. No Growth after 4 days. No Growth after 4 days. Gram stain aer bottle: Gram positive cocci in clusters resembling Staph  Results called to and read back by:Kathleen everett 11/28/2023  13:53  COAGULASE-NEGATIVE STAPHYLOCOCCUS SPECIES  Organism is a probable contaminant  * No Growth after 4 days.       Wound Culture:   Recent Labs   Lab 11/28/23  1347 12/01/23  1123   LABAERO Results called to and read back by:CHARITY STAFFORD 11/30/2023  10:39  METHICILLIN RESISTANT STAPHYLOCOCCUS AUREUS  Many  * STAPHYLOCOCCUS AUREUS  From broth only  *  STAPHYLOCOCCUS AUREUS  From broth only  *  STAPHYLOCOCCUS AUREUS  From broth only  Susceptibility pending  *  No growth       All pertinent labs within the past 24 hours have been reviewed.    Significant Imaging: I have reviewed all pertinent imaging results/findings within the past 24 hours.

## 2023-12-05 NOTE — PLAN OF CARE
Problem: Adult Inpatient Plan of Care  Goal: Plan of Care Review  Outcome: Ongoing, Progressing  Goal: Patient-Specific Goal (Individualized)  Outcome: Ongoing, Progressing  Goal: Absence of Hospital-Acquired Illness or Injury  Outcome: Ongoing, Progressing  Goal: Optimal Comfort and Wellbeing  Outcome: Ongoing, Progressing  Goal: Readiness for Transition of Care  Outcome: Ongoing, Progressing     Problem: Skin Injury Risk Increased  Goal: Skin Health and Integrity  Outcome: Ongoing, Progressing  Intervention: Optimize Skin Protection  Flowsheets (Taken 12/5/2023 1429)  Pressure Reduction Techniques: weight shift assistance provided  Head of Bed (HOB) Positioning: HOB lowered  Intervention: Promote and Optimize Oral Intake  Flowsheets (Taken 12/5/2023 1429)  Oral Nutrition Promotion: calorie-dense foods provided     Problem: Diabetes Comorbidity  Goal: Blood Glucose Level Within Targeted Range  Outcome: Ongoing, Progressing  Intervention: Monitor and Manage Glycemia  Flowsheets (Taken 12/5/2023 1429)  Glycemic Management: blood glucose monitored     Problem: Impaired Wound Healing  Goal: Optimal Wound Healing  Outcome: Ongoing, Progressing  Intervention: Promote Wound Healing  Flowsheets (Taken 12/5/2023 1429)  Oral Nutrition Promotion: calorie-dense foods provided  Activity Management: Rolling - L1  Pain Management Interventions: around-the-clock dosing utilized

## 2023-12-05 NOTE — NURSING
Reported off to oncoming nurse, patient resting in bed, aaox4. Patient can make needs known to staff, max assist required for adls and transfers, no acute distress noted, safety precautions maintained. Contact Precautions in progress.       Chart check completed.

## 2023-12-05 NOTE — PLAN OF CARE
No acute distress noted, patient free from falls or injury this shift. Bed in low position, wheels locked, call light in reach for assistance, plan of care continued.      Problem: Diabetes Comorbidity  Goal: Blood Glucose Level Within Targeted Range  Outcome: Ongoing, Progressing  Intervention: Monitor and Manage Glycemia  Flowsheets (Taken 12/5/2023 0622)  Glycemic Management: blood glucose monitored     Problem: Glycemic Control Impaired (Sepsis/Septic Shock)  Goal: Blood Glucose Level Within Desired Range  Outcome: Ongoing, Progressing  Intervention: Optimize Glycemic Control  Flowsheets (Taken 12/5/2023 0622)  Glycemic Management: blood glucose monitored     Problem: Impaired Wound Healing  Goal: Optimal Wound Healing  Outcome: Ongoing, Progressing  Intervention: Promote Wound Healing  Flowsheets (Taken 12/5/2023 0622)  Pain Management Interventions:   quiet environment facilitated   pillow support provided

## 2023-12-05 NOTE — NURSING
[facility], [facility]  Nurses Note -- 4 Eyes      12/5/2023       Skin assessed on: Q Shift      [] No Pressure Injuries Present    []Prevention Measures Documented    [x] Yes LDA  for Pressure Injury Previously documented     [] Yes New Pressure Injury Discovered   [] LDA for New Pressure Injury Added      Attending RN:  Samantha Keith RN     Second RN:  JOSLYN Davenport

## 2023-12-05 NOTE — PROGRESS NOTES
Pharmacokinetic Assessment Follow Up: IV Vancomycin    Vancomycin serum concentration assessment(s):    The random level was drawn correctly and can be used to guide therapy at this time. The measurement is within the desired definitive target range of 15 to 20 mcg/mL.    Vancomycin Regimen Plan:    Give 500 mg today.  Re-dose when the random level is less than 20 mcg/mL, next level to be drawn at 0400 on 12/6/2023    Drug levels (last 3 results):  Recent Labs   Lab Result Units 12/03/23  0508 12/04/23 0446 12/05/23 0433   Vancomycin, Random ug/mL 19.7 16.6 17.3       Pharmacy will continue to follow and monitor vancomycin.    Please contact pharmacy at extension [phone] for questions regarding this assessment.    Thank you for the consult,   Ryan Walker Jr       Patient brief summary:  Chato Bowie is a 73 y.o. male initiated on antimicrobial therapy with IV Vancomycin for treatment of skin & soft tissue infection    Drug Allergies:   Review of patient's allergies indicates:  No Known Allergies    Actual Body Weight:   82.7 kg    Renal Function:   Estimated Creatinine Clearance: 30.5 mL/min (A) (based on SCr of 2.3 mg/dL (H)).,     Dialysis Method (if applicable):  N/A    CBC (last 72 hours):  Recent Labs   Lab Result Units 12/02/23  1701 12/03/23 0508 12/04/23 0446 12/05/23 0433   WBC K/uL 7.40 7.20 7.22 8.50   Hemoglobin g/dL 8.4* 8.1* 8.0* 8.9*   Hematocrit % 27.8* 27.5* 27.1* 30.2*   Platelets K/uL 268 275 265 285   Gran % % 61.0 62.7 62.7 61.8   Lymph % % 15.4* 14.9* 14.5* 15.4*   Mono % % 16.2* 15.0 15.7* 15.9*   Eosinophil % % 6.1 5.7 5.5 4.9   Basophil % % 0.9 1.3 1.2 1.3   Differential Method  Automated Automated Automated Automated       Metabolic Panel (last 72 hours):  Recent Labs   Lab Result Units 12/02/23  1701 12/03/23  0508 12/04/23 0446 12/05/23  0433   Sodium mmol/L 135* 136 136 135*   Potassium mmol/L 3.0* 2.9* 3.2* 3.6   Chloride mmol/L 90* 91* 91* 94*   CO2 mmol/L 29 30* 28 27    Glucose mg/dL 116* 81 87 62*   BUN mg/dL 54* 55* 55* 52*   Creatinine mg/dL 2.2* 2.2* 2.3* 2.3*   Magnesium mg/dL  --  1.9 2.0 2.0       Vancomycin Administrations:  vancomycin given in the last 96 hours                     vancomycin (VANCOCIN) 500 mg in dextrose 5 % in water (D5W) 100 mL IVPB (MB+) (mg) 500 mg New Bag 12/04/23 0927    vancomycin 1,250 mg in dextrose 5 % (D5W) 250 mL IVPB (Vial-Mate) (mg) 1,250 mg New Bag 12/01/23 1644    vancomycin injection (g) 1 g Given 12/01/23 1043     1 g Given  1042                    Microbiologic Results:  Microbiology Results (last 7 days)       Procedure Component Value Units Date/Time    Culture, Anaerobe [[id number]] Collected: 12/01/23 1123    Order Status: Completed Specimen: Bone from Foot, Left Updated: 12/05/23 0526     Anaerobic Culture No anaerobes isolated    Narrative:      Right 5th metatarsal    Culture, Anaerobe [[id number]] Collected: 12/01/23 1123    Order Status: Completed Specimen: Bone from Foot, Left Updated: 12/05/23 0525     Anaerobic Culture No anaerobes isolated    Narrative:      Right heel    Culture, Anaerobe [[id number]] Collected: 12/01/23 1123    Order Status: Completed Specimen: Bone from Foot, Left Updated: 12/05/23 0525     Anaerobic Culture No anaerobes isolated    Narrative:      Left 5th metatarsal    Culture, Anaerobe [[id number]] Collected: 12/01/23 1123    Order Status: Completed Specimen: Bone from Foot, Left Updated: 12/05/23 0524     Anaerobic Culture No anaerobes isolated    Narrative:      Left heel    AFB Culture & Smear [[id number]] Collected: 12/01/23 1123    Order Status: Completed Specimen: Bone from Foot, Right Updated: 12/04/23 1534     AFB Culture & Smear Culture in progress     AFB CULTURE STAIN No acid fast bacilli seen.    Narrative:      Right heel    AFB Culture & Smear [[id number]] Collected: 12/01/23 1123    Order Status: Completed Specimen: Bone from Foot, Left Updated: 12/04/23 1534     AFB Culture & Smear  Culture in progress     AFB CULTURE STAIN No acid fast bacilli seen.    Narrative:      Left heel    AFB Culture & Smear [[id number]] Collected: 12/01/23 1123    Order Status: Completed Specimen: Bone from Foot, Left Updated: 12/04/23 1534     AFB Culture & Smear Culture in progress     AFB CULTURE STAIN No acid fast bacilli seen.    Narrative:      Left 5th metatarsal    AFB Culture & Smear [[id number]] Collected: 12/01/23 1123    Order Status: Completed Specimen: Bone from Foot, Right Updated: 12/04/23 1534     AFB Culture & Smear Culture in progress     AFB CULTURE STAIN No acid fast bacilli seen.    Narrative:      Right 5th metatarsal    Aerobic culture [[id number]] Collected: 12/01/23 1123    Order Status: Completed Specimen: Bone from Foot, Left Updated: 12/04/23 0615     Aerobic Bacterial Culture No growth    Narrative:      Right 5th metatarsal    Aerobic culture [[id number]] Collected: 12/01/23 1123    Order Status: Completed Specimen: Bone from Foot, Left Updated: 12/04/23 0554     Aerobic Bacterial Culture No growth    Narrative:      Right heel    Aerobic culture [[id number]] Collected: 12/01/23 1123    Order Status: Completed Specimen: Bone from Foot, Left Updated: 12/04/23 0549     Aerobic Bacterial Culture No growth    Narrative:      Left 5th metatarsal    Aerobic culture [[id number]] Collected: 12/01/23 1123    Order Status: Completed Specimen: Bone from Foot, Left Updated: 12/04/23 0546     Aerobic Bacterial Culture No growth    Narrative:      Left heel    Culture, Anaerobe [[id number]] Collected: 11/28/23 1347    Order Status: Completed Specimen: Wound from Foot, Right Updated: 12/02/23 0855     Anaerobic Culture No anaerobes isolated    Blood culture #1 [[id number]] Collected: 11/27/23 1520    Order Status: Completed Specimen: Blood from Peripheral, Antecubital, Left Updated: 12/01/23 1703     Blood Culture, Routine No Growth after 4 days.    Narrative:      Blood Culture #1    Gram stain  [[id number]] Collected: 12/01/23 1123    Order Status: Completed Specimen: Bone from Foot, Left Updated: 12/01/23 1429     Gram Stain Result Few WBC's      No organisms seen    Narrative:      Right 5th metatarsal    Gram stain [[id number]] Collected: 12/01/23 1123    Order Status: Completed Specimen: Bone from Foot, Left Updated: 12/01/23 1428     Gram Stain Result Rare WBC's      No organisms seen    Narrative:      Right heel    Gram stain [[id number]] Collected: 12/01/23 1123    Order Status: Completed Specimen: Bone from Foot, Left Updated: 12/01/23 1428     Gram Stain Result Rare WBC's      No organisms seen    Narrative:      Left 5th metatarsal    Gram stain [[id number]] Collected: 12/01/23 1123    Order Status: Completed Specimen: Bone from Foot, Left Updated: 12/01/23 1427     Gram Stain Result Rare WBC's      No organisms seen    Narrative:      Left heel    Fungus culture [[id number]] Collected: 12/01/23 1123    Order Status: No result Specimen: Bone from Foot, Left Updated: 12/01/23 1220    Fungus culture [[id number]] Collected: 12/01/23 1123    Order Status: Sent Specimen: Bone from Foot, Left Updated: 12/01/23 1216    Fungus culture [[id number]] Collected: 12/01/23 1123    Order Status: Sent Specimen: Bone from Foot, Left Updated: 12/01/23 1213    Fungus culture [[id number]] Collected: 12/01/23 1123    Order Status: Sent Specimen: Bone from Foot, Left Updated: 12/01/23 1207    Fungus culture [[id number]]     Order Status: No result Specimen: Bone from Foot, Left     Blood culture #2 [[id number]]  (Abnormal) Collected: 11/27/23 1512    Order Status: Completed Specimen: Blood from Peripheral, Antecubital, Left Updated: 12/01/23 0537     Blood Culture, Routine Gram stain aer bottle: Gram positive cocci in clusters resembling Staph      Results called to and read back by:Kathleen everett 11/28/2023      13:53      COAGULASE-NEGATIVE STAPHYLOCOCCUS SPECIES  Organism is a probable contaminant       Narrative:      Blood Culture #2    Aerobic culture [[id number]]  (Abnormal)  (Susceptibility) Collected: 11/28/23 1347    Order Status: Completed Specimen: Wound from Foot, Right Updated: 11/30/23 1041     Aerobic Bacterial Culture Results called to and read back by:CHARITY STAFFORD 11/30/2023  10:39      METHICILLIN RESISTANT STAPHYLOCOCCUS AUREUS  Many      Urine culture [[id number]]  (Abnormal)  (Susceptibility) Collected: 11/27/23 1855    Order Status: Completed Specimen: Urine Updated: 11/29/23 0812     Urine Culture, Routine PROTEUS MIRABILIS  10,000 - 49,999 cfu/ml      Narrative:      Specimen Source->Urine    Gram stain [[id number]] Collected: 11/28/23 1347    Order Status: Completed Specimen: Wound from Foot, Right Updated: 11/28/23 1502     Gram Stain Result Few WBC's      Many Gram positive cocci in pairs

## 2023-12-06 PROBLEM — R41.0 ACUTE DELIRIUM: Status: ACTIVE | Noted: 2023-12-06

## 2023-12-06 LAB
ANION GAP SERPL CALC-SCNC: 16 MMOL/L (ref 8–16)
BUN SERPL-MCNC: 48 MG/DL (ref 8–23)
CALCIUM SERPL-MCNC: 10.8 MG/DL (ref 8.7–10.5)
CHLORIDE SERPL-SCNC: 96 MMOL/L (ref 95–110)
CO2 SERPL-SCNC: 25 MMOL/L (ref 23–29)
CREAT SERPL-MCNC: 2.3 MG/DL (ref 0.5–1.4)
ERYTHROCYTE [DISTWIDTH] IN BLOOD BY AUTOMATED COUNT: 16.3 % (ref 11.5–14.5)
EST. GFR  (NO RACE VARIABLE): 29 ML/MIN/1.73 M^2
FINAL PATHOLOGIC DIAGNOSIS: NORMAL
GLUCOSE SERPL-MCNC: 74 MG/DL (ref 70–110)
GROSS: NORMAL
HCT VFR BLD AUTO: 34.7 % (ref 40–54)
HGB BLD-MCNC: 10.3 G/DL (ref 14–18)
Lab: NORMAL
MCH RBC QN AUTO: 23.2 PG (ref 27–31)
MCHC RBC AUTO-ENTMCNC: 29.7 G/DL (ref 32–36)
MCV RBC AUTO: 78 FL (ref 82–98)
PLATELET # BLD AUTO: 313 K/UL (ref 150–450)
PMV BLD AUTO: 8.1 FL (ref 9.2–12.9)
POCT GLUCOSE: 100 MG/DL (ref 70–110)
POCT GLUCOSE: 107 MG/DL (ref 70–110)
POCT GLUCOSE: 68 MG/DL (ref 70–110)
POCT GLUCOSE: 69 MG/DL (ref 70–110)
POCT GLUCOSE: 71 MG/DL (ref 70–110)
POCT GLUCOSE: 74 MG/DL (ref 70–110)
POTASSIUM SERPL-SCNC: 3.1 MMOL/L (ref 3.5–5.1)
RBC # BLD AUTO: 4.44 M/UL (ref 4.6–6.2)
SODIUM SERPL-SCNC: 137 MMOL/L (ref 136–145)
VANCOMYCIN SERPL-MCNC: 20.7 UG/ML
WBC # BLD AUTO: 9.97 K/UL (ref 3.9–12.7)

## 2023-12-06 PROCEDURE — 25000003 PHARM REV CODE 250: Performed by: HOSPITALIST

## 2023-12-06 PROCEDURE — 27000207 HC ISOLATION

## 2023-12-06 PROCEDURE — 85027 COMPLETE CBC AUTOMATED: CPT | Performed by: SURGERY

## 2023-12-06 PROCEDURE — 36200 PLACE CATHETER IN AORTA: CPT | Mod: ,,, | Performed by: SURGERY

## 2023-12-06 PROCEDURE — 75710 ARTERY X-RAYS ARM/LEG: CPT | Performed by: SURGERY

## 2023-12-06 PROCEDURE — 25000003 PHARM REV CODE 250: Performed by: STUDENT IN AN ORGANIZED HEALTH CARE EDUCATION/TRAINING PROGRAM

## 2023-12-06 PROCEDURE — 99152 PR MOD CONSCIOUS SEDATION, SAME PHYS, 5+ YRS, FIRST 15 MIN: ICD-10-PCS | Mod: ,,, | Performed by: SURGERY

## 2023-12-06 PROCEDURE — 75710 ARTERY X-RAYS ARM/LEG: CPT | Mod: 26,,, | Performed by: SURGERY

## 2023-12-06 PROCEDURE — 80048 BASIC METABOLIC PNL TOTAL CA: CPT | Performed by: SURGERY

## 2023-12-06 PROCEDURE — 99152 MOD SED SAME PHYS/QHP 5/>YRS: CPT | Mod: ,,, | Performed by: SURGERY

## 2023-12-06 PROCEDURE — 36415 COLL VENOUS BLD VENIPUNCTURE: CPT | Performed by: SURGERY

## 2023-12-06 PROCEDURE — 36200 PR PLACE CATH AORTA: ICD-10-PCS | Mod: ,,, | Performed by: SURGERY

## 2023-12-06 PROCEDURE — 80202 ASSAY OF VANCOMYCIN: CPT | Performed by: STUDENT IN AN ORGANIZED HEALTH CARE EDUCATION/TRAINING PROGRAM

## 2023-12-06 PROCEDURE — 36200 PLACE CATHETER IN AORTA: CPT | Performed by: SURGERY

## 2023-12-06 PROCEDURE — 63600175 PHARM REV CODE 636 W HCPCS: Performed by: STUDENT IN AN ORGANIZED HEALTH CARE EDUCATION/TRAINING PROGRAM

## 2023-12-06 PROCEDURE — 11000001 HC ACUTE MED/SURG PRIVATE ROOM

## 2023-12-06 PROCEDURE — 25500020 PHARM REV CODE 255: Performed by: HOSPITALIST

## 2023-12-06 PROCEDURE — 63600175 PHARM REV CODE 636 W HCPCS: Performed by: SURGERY

## 2023-12-06 PROCEDURE — 75710 PR  ANGIO EXTREMITY UNILAT: ICD-10-PCS | Mod: 26,,, | Performed by: SURGERY

## 2023-12-06 PROCEDURE — 36415 COLL VENOUS BLD VENIPUNCTURE: CPT | Performed by: STUDENT IN AN ORGANIZED HEALTH CARE EDUCATION/TRAINING PROGRAM

## 2023-12-06 PROCEDURE — 25000003 PHARM REV CODE 250: Performed by: SURGERY

## 2023-12-06 PROCEDURE — 99233 PR SUBSEQUENT HOSPITAL CARE,LEVL III: ICD-10-PCS | Mod: GC,,, | Performed by: INTERNAL MEDICINE

## 2023-12-06 PROCEDURE — 99233 SBSQ HOSP IP/OBS HIGH 50: CPT | Mod: GC,,, | Performed by: INTERNAL MEDICINE

## 2023-12-06 RX ORDER — FENTANYL CITRATE 50 UG/ML
INJECTION, SOLUTION INTRAMUSCULAR; INTRAVENOUS
Status: COMPLETED | OUTPATIENT
Start: 2023-12-06 | End: 2023-12-06

## 2023-12-06 RX ORDER — LIDOCAINE HYDROCHLORIDE 10 MG/ML
INJECTION INFILTRATION; PERINEURAL
Status: COMPLETED | OUTPATIENT
Start: 2023-12-06 | End: 2023-12-06

## 2023-12-06 RX ORDER — POTASSIUM CHLORIDE 20 MEQ/1
40 TABLET, EXTENDED RELEASE ORAL 3 TIMES DAILY
Status: COMPLETED | OUTPATIENT
Start: 2023-12-06 | End: 2023-12-06

## 2023-12-06 RX ORDER — PROTAMINE SULFATE 10 MG/ML
INJECTION, SOLUTION INTRAVENOUS
Status: COMPLETED | OUTPATIENT
Start: 2023-12-06 | End: 2023-12-06

## 2023-12-06 RX ORDER — CEFAZOLIN SODIUM 1 G/50ML
SOLUTION INTRAVENOUS
Status: COMPLETED | OUTPATIENT
Start: 2023-12-06 | End: 2023-12-06

## 2023-12-06 RX ORDER — HEPARIN SODIUM 1000 [USP'U]/ML
INJECTION, SOLUTION INTRAVENOUS; SUBCUTANEOUS
Status: COMPLETED | OUTPATIENT
Start: 2023-12-06 | End: 2023-12-06

## 2023-12-06 RX ORDER — SODIUM CHLORIDE, SODIUM LACTATE, POTASSIUM CHLORIDE, CALCIUM CHLORIDE 600; 310; 30; 20 MG/100ML; MG/100ML; MG/100ML; MG/100ML
INJECTION, SOLUTION INTRAVENOUS CONTINUOUS
Status: DISCONTINUED | OUTPATIENT
Start: 2023-12-06 | End: 2023-12-08 | Stop reason: HOSPADM

## 2023-12-06 RX ORDER — SODIUM CHLORIDE 0.9 % (FLUSH) 0.9 %
10 SYRINGE (ML) INJECTION EVERY 6 HOURS
Status: DISCONTINUED | OUTPATIENT
Start: 2023-12-06 | End: 2023-12-08 | Stop reason: HOSPADM

## 2023-12-06 RX ORDER — SODIUM CHLORIDE 0.9 % (FLUSH) 0.9 %
10 SYRINGE (ML) INJECTION
Status: DISCONTINUED | OUTPATIENT
Start: 2023-12-06 | End: 2023-12-08 | Stop reason: HOSPADM

## 2023-12-06 RX ORDER — MIDAZOLAM HYDROCHLORIDE 1 MG/ML
INJECTION INTRAMUSCULAR; INTRAVENOUS
Status: COMPLETED | OUTPATIENT
Start: 2023-12-06 | End: 2023-12-06

## 2023-12-06 RX ADMIN — ASPIRIN 81 MG CHEWABLE TABLET 81 MG: 81 TABLET CHEWABLE at 08:12

## 2023-12-06 RX ADMIN — METRONIDAZOLE 500 MG: 500 INJECTION, SOLUTION INTRAVENOUS at 12:12

## 2023-12-06 RX ADMIN — POTASSIUM CHLORIDE 40 MEQ: 1500 TABLET, EXTENDED RELEASE ORAL at 08:12

## 2023-12-06 RX ADMIN — LACTULOSE 20 G: 20 SOLUTION ORAL at 08:12

## 2023-12-06 RX ADMIN — Medication 6 MG: at 08:12

## 2023-12-06 RX ADMIN — INSULIN DETEMIR 8 UNITS: 100 INJECTION, SOLUTION SUBCUTANEOUS at 08:12

## 2023-12-06 RX ADMIN — POTASSIUM CHLORIDE 40 MEQ: 1500 TABLET, EXTENDED RELEASE ORAL at 04:12

## 2023-12-06 RX ADMIN — METRONIDAZOLE 500 MG: 500 INJECTION, SOLUTION INTRAVENOUS at 09:12

## 2023-12-06 RX ADMIN — FOLIC ACID 1 MG: 1 TABLET ORAL at 08:12

## 2023-12-06 RX ADMIN — SENNOSIDES AND DOCUSATE SODIUM 1 TABLET: 8.6; 5 TABLET ORAL at 08:12

## 2023-12-06 RX ADMIN — FENTANYL CITRATE 50 MCG: 50 INJECTION INTRAMUSCULAR; INTRAVENOUS at 03:12

## 2023-12-06 RX ADMIN — IOHEXOL 24 ML: 350 INJECTION, SOLUTION INTRAVENOUS at 04:12

## 2023-12-06 RX ADMIN — SODIUM CHLORIDE, POTASSIUM CHLORIDE, SODIUM LACTATE AND CALCIUM CHLORIDE: 600; 310; 30; 20 INJECTION, SOLUTION INTRAVENOUS at 11:12

## 2023-12-06 RX ADMIN — FERROUS SULFATE TAB 325 MG (65 MG ELEMENTAL FE) 1 EACH: 325 (65 FE) TAB at 08:12

## 2023-12-06 RX ADMIN — LIDOCAINE HYDROCHLORIDE 10 ML: 10 INJECTION, SOLUTION INFILTRATION; PERINEURAL at 03:12

## 2023-12-06 RX ADMIN — FUROSEMIDE 40 MG: 40 TABLET ORAL at 08:12

## 2023-12-06 RX ADMIN — HYDROCODONE BITARTRATE AND ACETAMINOPHEN 1 TABLET: 5; 325 TABLET ORAL at 08:12

## 2023-12-06 RX ADMIN — CEFAZOLIN SODIUM 2 G: 1 SOLUTION INTRAVENOUS at 02:12

## 2023-12-06 RX ADMIN — AMIODARONE HYDROCHLORIDE 200 MG: 200 TABLET ORAL at 08:12

## 2023-12-06 RX ADMIN — ATORVASTATIN CALCIUM 40 MG: 40 TABLET, FILM COATED ORAL at 08:12

## 2023-12-06 RX ADMIN — MIDAZOLAM HYDROCHLORIDE 1 MG: 1 INJECTION, SOLUTION INTRAMUSCULAR; INTRAVENOUS at 03:12

## 2023-12-06 RX ADMIN — PROTAMINE SULFATE 15 MG: 10 INJECTION, SOLUTION INTRAVENOUS at 03:12

## 2023-12-06 RX ADMIN — PROTAMINE SULFATE 5 MG: 10 INJECTION, SOLUTION INTRAVENOUS at 03:12

## 2023-12-06 RX ADMIN — MUPIROCIN: 20 OINTMENT TOPICAL at 09:12

## 2023-12-06 RX ADMIN — METOPROLOL SUCCINATE 12.5 MG: 25 TABLET, EXTENDED RELEASE ORAL at 08:12

## 2023-12-06 RX ADMIN — METOLAZONE 5 MG: 5 TABLET ORAL at 08:12

## 2023-12-06 RX ADMIN — HEPARIN SODIUM 3000 UNITS: 1000 INJECTION, SOLUTION INTRAVENOUS; SUBCUTANEOUS at 03:12

## 2023-12-06 RX ADMIN — TAMSULOSIN HYDROCHLORIDE 0.4 MG: 0.4 CAPSULE ORAL at 08:12

## 2023-12-06 RX ADMIN — LACTULOSE 20 G: 20 SOLUTION ORAL at 04:12

## 2023-12-06 NOTE — BRIEF OP NOTE
Date:  12/06/2023    Surgeon: Osei Yeboah MD PhD    Assistant:  None    Pre-op Diagnosis: PAD, bilateral decubitus heel wounds    Post-op Diagnosis: Same    Procedures:   1. Conscious sedation  2. Ultrasound-guided access to the left common femoral artery in an antegrade fashion.   3. Percutaneous closure of the above access site using ProGlide closure device   4. Left lower extremity diagnostic angiogram          EBL: Minimal    Anesthesia: Local, RN IV sedation    Findings:   Widely patent fem-pop, two-vessel runoff via robust PT and peroneal which directly filled patient's heel wound.          Complications:  None; patient tolerated the procedure well.    Disposition: Recoery- hemodynamically stable in good condition    Plan:    No role for revascularization and no intervention performed  Keep flat tell 6:30 p.m. then patient consent up and eat dinner

## 2023-12-06 NOTE — ASSESSMENT & PLAN NOTE
-patient with some withdrawn or fighting behavior.  -Continue correction of metabolic derangements  -Maintain Delirium precautions: Maintain regular sleep cycle. Early ambulation. Minimal interruptions overnight. Re-orient patient frequently. Maintain adequate bowel regimen, hydration and electrolyte replenishments. Hearing Aids and eye glasses as needed.

## 2023-12-06 NOTE — NURSING
[facility] [place]  Nurses Note -- 4 Eyes      12/6/2023       Skin assessed on: Q Shift      [] No Pressure Injuries Present    []Prevention Measures Documented    [x] Yes LDA  for Pressure Injury Previously documented     [] Yes New Pressure Injury Discovered   [] LDA for New Pressure Injury Added      Attending RN:  Samantha Keith RN     Second RN:  JOSLYN Davenport

## 2023-12-06 NOTE — ASSESSMENT & PLAN NOTE
-KUB on 12/5 shows stool burden  -stool softeners ordered .  Patient had a bowel movement on 12/06.  -continue stool softeners for now

## 2023-12-06 NOTE — ASSESSMENT & PLAN NOTE
History of multiple decubitus ulcers, previous buttock ulcers now healed  Has bilateral heel wound and lat rt foot  CRP 57.8,Sed rate 83,with procalcitonin elevated at 6.09  Podiatry and ID consulted  -S/p OR debridement/ bone biopsy B/L feet.Left heel and lateral foot with necrotic base. Abx beads in place to all wounds. DSD applied.   -wound cultures growing MRSA. Started on vancomycin/cefepime/flagyl per id while pending cultures.  Cefepime now discontinued   -Vascular surgery consulted.  Angiogram on 12/06  -continue wound care  -will need SNF placement for ongoing IV antibiotics (anticipated for 6 weeks) and wound care

## 2023-12-06 NOTE — PROGRESS NOTES
[facility] Medicine  Telemedicine Progress Note    Patient Name: Chato Bowie  MRN: [id number]  Patient Class: IP- Inpatient   Admission Date: 11/27/2023  Length of Stay: 9 days  Attending Physician: Nhung Martínez MD  Primary Care Provider: Irlanda Valenzuela -          Subjective:     Principal Problem:Osteomyelitis of foot        HPI:  73-year-old  male,current a nursing home resident,who was brought to the ED for evaluation after receiving x-ray imaging obtained 5 days earlier with concern for possible osteomyelitis,prior to obtaining the knee and foot x-ray,he had complained of left knee pain,he also has bilateral heel decubitus ulcers,wound is covered with escar,and can not be staged,although it was reported that he has osteomyelitis but the xray obtained did not show evidence of osteo per report,pain in the knee is sharp,non radiating,could be 10 out 10 in severity some time,currently only able to move around in a wheel chair,he denied fever,chills or rigor,although reports straining at micturition and urgency but denied dysuria,frequency or hematuria.He denied chest pain,shortness of breath,or orthopnea,although he has chronic bilateral lower extremities swelling,worse in the left lower extremity compared to the right,he denied previous history of lower extremity deep vein thrombosis.He denied change in bowel habit,no diarrhea or constipation.  His medical history os significant for type 2 DM,hypertension,hyperlipidemia,heart failure,coronary artery disease, hyperlipidemia,alcohol abuse,CKD 3b and multiple thyroid nodules.labs obtained showed elevated acute phase reactant with sed rate 83,procalcitonin 6.09,crp 57.8,but x-ray did not show evidence, will need MRI to confirm early osteomyelitis.    Overview/Hospital Course:  No notes on file    Interval History: Pt noted to be afebrile and hemodynamically stable. Stable respiratory status.  Patient did have a bowel  movement.  Continue stool softeners for now.  Per RN, patient has been withdrawn and finding.  ID, Podiatry, vascular surgery following.  Cefepime stopped, continue vancomycin, Flagyl per ID recommendations.  PICC line ordered.  Peripheral angiogram today by vascular surgery.  Patient will need SNF once final antibiotic decided ID.  Continue wound care.    Review of Systems   Constitutional:  Positive for activity change and fatigue. Negative for fever.   Respiratory:  Negative for cough and shortness of breath.    Cardiovascular:  Negative for chest pain.   Gastrointestinal:  Positive for abdominal pain and constipation.   Musculoskeletal:  Positive for arthralgias.   Neurological:  Negative for dizziness and headaches.   Psychiatric/Behavioral:  Negative for confusion.    All other systems reviewed and are negative.    Objective:     Vital Signs (Most Recent):  Temp: 97.8 °F (36.6 °C) (12/06/23 1153)  Pulse: 70 (12/06/23 1127)  Resp: 20 (12/06/23 1127)  BP: (!) 111/53 (12/06/23 1127)  SpO2: 96 % (12/06/23 1127) Vital Signs (24h Range):  Temp:  [96.1 °F (35.6 °C)-98 °F (36.7 °C)] 97.8 °F (36.6 °C)  Pulse:  [62-70] 70  Resp:  [17-20] 20  SpO2:  [96 %-100 %] 96 %  BP: (111-158)/(53-72) 111/53     Weight: 82.7 kg (182 lb 5.1 oz)  Body mass index is 25.43 kg/m².    Intake/Output Summary (Last 24 hours) at 12/6/2023 1400  Last data filed at 12/6/2023 1342  Gross per 24 hour   Intake 909.98 ml   Output 400 ml   Net 509.98 ml           Physical Exam  Vitals and nursing note reviewed.   Constitutional:       Appearance: Normal appearance.   HENT:      Head: Normocephalic and atraumatic.   Eyes:      Extraocular Movements: Extraocular movements intact.      Pupils: Pupils are equal, round, and reactive to light.   Cardiovascular:      Rate and Rhythm: Normal rate and regular rhythm.   Pulmonary:      Effort: Pulmonary effort is normal. No respiratory distress.   Abdominal:      General: Abdomen is flat. There is no  distension.   Musculoskeletal:         General: Normal range of motion.      Cervical back: Normal range of motion.      Comments: Foot wound   Neurological:      General: No focal deficit present.      Mental Status: He is alert and oriented to person, place, and time.   Psychiatric:         Mood and Affect: Mood normal.         Behavior: Behavior normal.             Significant Labs: All pertinent labs within the past 24 hours have been reviewed.  CBC:   Recent Labs   Lab 12/05/23  0433 12/06/23  0958   WBC 8.50 9.97   HGB 8.9* 10.3*   HCT 30.2* 34.7*    313       CMP:   Recent Labs   Lab 12/05/23  0433 12/06/23  0958   * 137   K 3.6 3.1*   CL 94* 96   CO2 27 25   GLU 62* 74   BUN 52* 48*   CREATININE 2.3* 2.3*   CALCIUM 10.0 10.8*   ANIONGAP 14 16         Significant Imaging: I have reviewed all pertinent imaging results/findings within the past 24 hours.    Assessment/Plan:      * Osteomyelitis of foot  History of multiple decubitus ulcers, previous buttock ulcers now healed  Has bilateral heel wound and lat rt foot  CRP 57.8,Sed rate 83,with procalcitonin elevated at 6.09  -ID, podiatry consulted.  Patient s/p I&D.  Continue to follow-up cultures and continue antibiotics per Infectious Disease recommendations.           Acute delirium  -patient with some withdrawn or fighting behavior.  -Continue correction of metabolic derangements  -Maintain Delirium precautions: Maintain regular sleep cycle. Early ambulation. Minimal interruptions overnight. Re-orient patient frequently. Maintain adequate bowel regimen, hydration and electrolyte replenishments. Hearing Aids and eye glasses as needed.      Atherosclerosis of native artery of extremity  Vascular consulted  Plan for angiogram on 12/6      Chronic diastolic heart failure  History of grade 3 diastolic dysfunction  Does not seem to be in acute diastolic heart failure exacerbation  Will continue twice daily furosemide, with metolazone        Multiple  wounds  History of multiple decubitus ulcers, previous buttock ulcers now healed  Has bilateral heel wound and lat rt foot  CRP 57.8,Sed rate 83,with procalcitonin elevated at 6.09  Podiatry and ID consulted  -S/p OR debridement/ bone biopsy B/L feet.Left heel and lateral foot with necrotic base. Abx beads in place to all wounds. DSD applied.   -wound cultures growing MRSA. Started on vancomycin/cefepime/flagyl per id while pending cultures.  Cefepime now discontinued   -Vascular surgery consulted.  Angiogram on 12/06  -continue wound care  -will need SNF placement for ongoing IV antibiotics (anticipated for 6 weeks) and wound care    UTI (urinary tract infection)  Complained of urgency and straining at micturition  UA showed cloudy urine,with 3+ leukocyte esterase,rbc 66,wbc>100 and moderate bacteria  Currently symptoms has abaited, proteus growing in urine with ceftriaxone sensitive.  -continue abx    Paroxysmal atrial flutter  - His of paroxysmal atrial fibrillation with multiple MARIA and cardiovesion  - Currently on amiodarone and metoprolol 50xl  - Cont apixan due to risk of thromboembolic stroke  - continue to monitor on telemetry   - Maintain K > 4, Mg > 2        BPH (benign prostatic hyperplasia)  Stable  Will consider tamsulosin for symptomatic treatment      Stage 3b chronic kidney disease (CKD)  Creatine stable for now. BMP reviewed- noted Estimated Creatinine Clearance: 30.5 mL/min (A) (based on SCr of 2.3 mg/dL (H)). according to latest data. Based on current GFR, CKD stage is stage 3 - GFR 30-59.    Monitor UOP and serial BMP and adjust therapy as needed. Renally dose meds.   Avoid nephrotoxic medications and procedures.    Left knee pain  History of chronic left knee pain  Xray showed effusion  Septic arthritis vs gout vs effusion  Knee tap attempted, but minimal fluid able to be collected. Ortho following, but feel this is unlikely septic  ID following.  Continue antibiotics as stated  above      Alcohol abuse  History of alcohol use  Now in remission, has not had a drink in the recent past  Not at risk of withdrawal  Cont multivitamin,thiamine and folic acid      Hypokalemia  -labs reviewed  -potassium 3.1 on 12/06  -replete and monitor    GERD (gastroesophageal reflux disease)  - Stable    Anemia  Patient's anemia is currently uncontrolled. Has not received any PRBCs to date. Etiology likely d/t Iron deficiency and chronic disease due to Chronic Kidney Disease/ESRD  Current CBC reviewed-   Lab Results   Component Value Date    HGB 8.0 (L) 12/04/2023    HCT 27.1 (L) 12/04/2023   Will obtain iron panel and ferritin  Monitor serial CBC and transfuse if patient becomes hemodynamically unstable, symptomatic or H/H drops below 7/21.    Type 2 diabetes mellitus with kidney complication, with long-term current use of insulin  Patient's FSGs are controlled on current medication regimen.  Last A1c reviewed-   Lab Results   Component Value Date    HGBA1C 5.5 09/20/2023     Most recent fingerstick glucose reviewed-   Recent Labs   Lab 12/03/23  2147 12/04/23  0743 12/04/23  1056 12/04/23  1634   POCTGLUCOSE 110 85 144* 137*       Current correctional scale  Low  Low hemoglobin A1C likely due to worsening kidney functions  Antihyperglycemics (From admission, onward)      Start     Stop Route Frequency Ordered    11/28/23 0900  insulin detemir U-100 (Levemir) pen 8 Units         -- SubQ 2 times daily 11/28/23 0241    11/28/23 0715  insulin aspart U-100 pen 2 Units         -- SubQ 3 times daily with meals 11/28/23 0241    11/28/23 0334  insulin aspart U-100 pen 0-5 Units         -- SubQ Before meals & nightly PRN 11/28/23 0241          Hold Oral hypoglycemics while patient is in the hospital.  Will start basal,bolus and correctional insulin as needed    Dyslipidemia  -Continue statin  -Stable,lifestyle modification      Essential hypertension  Chronic, uncontrolled. Latest blood pressure and vitals reviewed-      Temp:  [96.1 °F (35.6 °C)-98 °F (36.7 °C)]   Pulse:  [62-70]   Resp:  [17-20]   BP: (111-158)/(53-72)   SpO2:  [96 %-100 %] .   Home meds for hypertension were reviewed and noted below.   Hypertension Medications               furosemide (LASIX) 80 MG tablet Take 1 tablet (80 mg total) by mouth 2 (two) times daily.    metOLazone (ZAROXOLYN) 5 MG tablet Take 1 tablet (5 mg total) by mouth once daily.    metoprolol succinate (TOPROL-XL) 25 MG 24 hr tablet Take 25 mg by mouth once daily.            While in the hospital, will manage blood pressure as follows; Continue home antihypertensive regimen    Will utilize p.r.n. blood pressure medication only if patient's blood pressure greater than 180/110 and he develops symptoms such as worsening chest pain or shortness of breath.    Abdominal pain  -KUB on 12/5 shows stool burden  -stool softeners ordered .  Patient had a bowel movement on 12/06.  -continue stool softeners for now        VTE Risk Mitigation (From admission, onward)           Ordered     Place sequential compression device  Until discontinued         11/28/23 0241     Reason for no Mechanical VTE Prophylaxis  Once        Question:  Reasons:  Answer:  Physician Provided (leave comment)    11/28/23 0241     IP VTE HIGH RISK PATIENT  Once         11/28/23 0241                          I have completed this tele-visit without the assistance of a telepresenter.    The attending portion of this evaluation, treatment, and documentation was performed per Nhung Martínez MD via Telemedicine AudioVisual using the secure [employer] software platform with 2 way audio/video. The provider was located off-site and the patient is located in the hospital. The aforementioned video software was utilized to document the relevant history and physical exam    Nhung Martínez MD  Department of Hospital Medicine   [facility] - [place], [place] - [facility]

## 2023-12-06 NOTE — PLAN OF CARE
No acute distress noted, patient free from falls or injury this shift. Bed in low position, wheels locked, call light in reach for assistance, plan of care continued.      Problem: Skin Injury Risk Increased  Goal: Skin Health and Integrity  Outcome: Ongoing, Progressing     Problem: Impaired Wound Healing  Goal: Optimal Wound Healing  Outcome: Ongoing, Progressing

## 2023-12-06 NOTE — PROGRESS NOTES
[facility] Medicine  Telemedicine Progress Note    Patient Name: Chato Bowie  MRN: [id number]  Patient Class: IP- Inpatient   Admission Date: 11/27/2023  Length of Stay: 9 days  Attending Physician: Nhung Martínez MD  Primary Care Provider: Irlanda Valenzuela -          Subjective:     Principal Problem:Osteomyelitis of foot        HPI:  73-year-old  male,current a nursing home resident,who was brought to the ED for evaluation after receiving x-ray imaging obtained 5 days earlier with concern for possible osteomyelitis,prior to obtaining the knee and foot x-ray,he had complained of left knee pain,he also has bilateral heel decubitus ulcers,wound is covered with escar,and can not be staged,although it was reported that he has osteomyelitis but the xray obtained did not show evidence of osteo per report,pain in the knee is sharp,non radiating,could be 10 out 10 in severity some time,currently only able to move around in a wheel chair,he denied fever,chills or rigor,although reports straining at micturition and urgency but denied dysuria,frequency or hematuria.He denied chest pain,shortness of breath,or orthopnea,although he has chronic bilateral lower extremities swelling,worse in the left lower extremity compared to the right,he denied previous history of lower extremity deep vein thrombosis.He denied change in bowel habit,no diarrhea or constipation.  His medical history os significant for type 2 DM,hypertension,hyperlipidemia,heart failure,coronary artery disease, hyperlipidemia,alcohol abuse,CKD 3b and multiple thyroid nodules.labs obtained showed elevated acute phase reactant with sed rate 83,procalcitonin 6.09,crp 57.8,but x-ray did not show evidence, will need MRI to confirm early osteomyelitis.    Overview/Hospital Course:  No notes on file    Interval History: Pt noted to be afebrile and hemodynamically stable. Stable respiratory status. Angiogram in AM. Has  abdominal pain, KUB with stool burden. Stool softeneres ordered.     Review of Systems   Constitutional:  Positive for activity change and fatigue. Negative for fever.   Respiratory:  Negative for cough and shortness of breath.    Cardiovascular:  Negative for chest pain.   Gastrointestinal:  Positive for abdominal pain and constipation.   Musculoskeletal:  Positive for arthralgias.   Neurological:  Negative for dizziness and headaches.   Psychiatric/Behavioral:  Negative for confusion.    All other systems reviewed and are negative.    Objective:     Vital Signs (Most Recent):  Temp: 98 °F (36.7 °C) (12/05/23 2355)  Pulse: 69 (12/05/23 2355)  Resp: 18 (12/05/23 2355)  BP: (!) 158/72 (12/05/23 2355)  SpO2: 97 % (12/05/23 2355) Vital Signs (24h Range):  Temp:  [97.4 °F (36.3 °C)-98 °F (36.7 °C)] 98 °F (36.7 °C)  Pulse:  [57-70] 69  Resp:  [18-19] 18  SpO2:  [97 %-100 %] 97 %  BP: (126-158)/(62-72) 158/72     Weight: 82.7 kg (182 lb 5.1 oz)  Body mass index is 25.43 kg/m².    Intake/Output Summary (Last 24 hours) at 12/6/2023 0228  Last data filed at 12/5/2023 1828  Gross per 24 hour   Intake 1517.56 ml   Output --   Net 1517.56 ml           Physical Exam  Vitals and nursing note reviewed.   Constitutional:       Appearance: Normal appearance.   HENT:      Head: Normocephalic and atraumatic.   Eyes:      Extraocular Movements: Extraocular movements intact.      Pupils: Pupils are equal, round, and reactive to light.   Cardiovascular:      Rate and Rhythm: Normal rate and regular rhythm.   Pulmonary:      Effort: Pulmonary effort is normal. No respiratory distress.   Abdominal:      General: Abdomen is flat. There is no distension.   Musculoskeletal:         General: Normal range of motion.      Cervical back: Normal range of motion.      Comments: Foot wound   Neurological:      General: No focal deficit present.      Mental Status: He is alert and oriented to person, place, and time.   Psychiatric:         Mood and  Affect: Mood normal.         Behavior: Behavior normal.             Significant Labs: All pertinent labs within the past 24 hours have been reviewed.  CBC:   Recent Labs   Lab 12/04/23  0446 12/05/23 0433   WBC 7.22 8.50   HGB 8.0* 8.9*   HCT 27.1* 30.2*    285       CMP:   Recent Labs   Lab 12/04/23  0446 12/05/23  0433    135*   K 3.2* 3.6   CL 91* 94*   CO2 28 27   GLU 87 62*   BUN 55* 52*   CREATININE 2.3* 2.3*   CALCIUM 9.6 10.0   ANIONGAP 17* 14         Significant Imaging: I have reviewed all pertinent imaging results/findings within the past 24 hours.    Assessment/Plan:      * Osteomyelitis of foot  History of multiple decubitus ulcers, previous buttock ulcers now healed  Has bilateral heel wound and lat rt foot  CRP 57.8,Sed rate 83,with procalcitonin elevated at 6.09  -ID, podiatry consulted.  Patient s/p I&D.  Continue to follow-up cultures and continue antibiotics per Infectious Disease recommendations.           Atherosclerosis of native artery of extremity  Vascular consulted  Plan for angiogram on 12/7      Chronic diastolic heart failure  History of grade 3 diastolic dysfunction  Does not seem to be in acute diastolic heart failure exacerbation  Will continue twice daily furosemide, with metolazone        Multiple wounds  History of multiple decubitus ulcers, previous buttock ulcers now healed  Has bilateral heel wound and lat rt foot  CRP 57.8,Sed rate 83,with procalcitonin elevated at 6.09  Podiatry and ID consulted  -S/p OR debridement/ bone biopsy B/L feet.Left heel and lateral foot with necrotic base. Abx beads in place to all wounds. DSD applied.   -wound cultures growing MRSA. Started on vancomycin/cefepime/flagyl per id while pending cultures.   -Vascular surgery consulted    UTI (urinary tract infection)  Complained of urgency and straining at micturition  UA showed cloudy urine,with 3+ leukocyte esterase,rbc 66,wbc>100 and moderate bacteria  Currently symptoms has abaited,  proteus growing in urine with ceftriaxone sensitive.  -continue abx    Paroxysmal atrial flutter  - His of paroxysmal atrial fibrillation with multiple MARIA and cardiovesion  - Currently on amiodarone and metoprolol 50xl  - Cont apixan due to risk of thromboembolic stroke  - continue to monitor on telemetry   - Maintain K > 4, Mg > 2        BPH (benign prostatic hyperplasia)  Stable  Will consider tamsulosin for symptomatic treatment      Stage 3b chronic kidney disease (CKD)  Creatine stable for now. BMP reviewed- noted Estimated Creatinine Clearance: 30.5 mL/min (A) (based on SCr of 2.3 mg/dL (H)). according to latest data. Based on current GFR, CKD stage is stage 3 - GFR 30-59.    Monitor UOP and serial BMP and adjust therapy as needed. Renally dose meds.   Avoid nephrotoxic medications and procedures.    Left knee pain  History of chronic left knee pain  Xray showed effusion  Septic arthritis vs gout vs effusion  Knee tap attempted, but minimal fluid able to be collected. Ortho following, but feel this is unlikely septic  ID following.  Continue antibiotics as stated above      Alcohol abuse  History of alcohol use  Now in remission, has not had a drink in the recent past  Not at risk of withdrawal  Cont multivitamin,thiamine and folic acid      Hypokalemia  -labs reviewed  -replete and monitor    GERD (gastroesophageal reflux disease)  - Stable    Anemia  Patient's anemia is currently uncontrolled. Has not received any PRBCs to date. Etiology likely d/t Iron deficiency and chronic disease due to Chronic Kidney Disease/ESRD  Current CBC reviewed-   Lab Results   Component Value Date    HGB 8.0 (L) 12/04/2023    HCT 27.1 (L) 12/04/2023   Will obtain iron panel and ferritin  Monitor serial CBC and transfuse if patient becomes hemodynamically unstable, symptomatic or H/H drops below 7/21.    Type 2 diabetes mellitus with kidney complication, with long-term current use of insulin  Patient's FSGs are controlled on  current medication regimen.  Last A1c reviewed-   Lab Results   Component Value Date    HGBA1C 5.5 09/20/2023     Most recent fingerstick glucose reviewed-   Recent Labs   Lab 12/03/23  2147 12/04/23  0743 12/04/23  1056 12/04/23  1634   POCTGLUCOSE 110 85 144* 137*       Current correctional scale  Low  Low hemoglobin A1C likely due to worsening kidney functions  Antihyperglycemics (From admission, onward)      Start     Stop Route Frequency Ordered    11/28/23 0900  insulin detemir U-100 (Levemir) pen 8 Units         -- SubQ 2 times daily 11/28/23 0241    11/28/23 0715  insulin aspart U-100 pen 2 Units         -- SubQ 3 times daily with meals 11/28/23 0241 11/28/23 0334  insulin aspart U-100 pen 0-5 Units         -- SubQ Before meals & nightly PRN 11/28/23 0241          Hold Oral hypoglycemics while patient is in the hospital.  Will start basal,bolus and correctional insulin as needed    Dyslipidemia  -Continue statin  -Stable,lifestyle modification      Essential hypertension  Chronic, uncontrolled. Latest blood pressure and vitals reviewed-     Temp:  [98.3 °F (36.8 °C)-98.6 °F (37 °C)]   Pulse:  [55-67]   Resp:  [16-20]   BP: (107-140)/(53-64)   SpO2:  [96 %-99 %] .   Home meds for hypertension were reviewed and noted below.   Hypertension Medications               furosemide (LASIX) 80 MG tablet Take 1 tablet (80 mg total) by mouth 2 (two) times daily.    metOLazone (ZAROXOLYN) 5 MG tablet Take 1 tablet (5 mg total) by mouth once daily.    metoprolol succinate (TOPROL-XL) 25 MG 24 hr tablet Take 25 mg by mouth once daily.            While in the hospital, will manage blood pressure as follows; Continue home antihypertensive regimen    Will utilize p.r.n. blood pressure medication only if patient's blood pressure greater than 180/110 and he develops symptoms such as worsening chest pain or shortness of breath.    Abdominal pain  -KUB on 12/6 shows stool burden  -stool softeners ordered         VTE Risk  Mitigation (From admission, onward)           Ordered     Place sequential compression device  Until discontinued         11/28/23 0241     Reason for no Mechanical VTE Prophylaxis  Once        Question:  Reasons:  Answer:  Physician Provided (leave comment)    11/28/23 0241     IP VTE HIGH RISK PATIENT  Once         11/28/23 0241                          I have completed this tele-visit without the assistance of a telepresenter.    The attending portion of this evaluation, treatment, and documentation was performed per Nhung Martínez MD via Telemedicine AudioVisual using the secure [place]  software platform with 2 way audio/video. The provider was located off-site and the patient is located in the hospital. The aforementioned video software was utilized to document the relevant history and physical exam    Nhung Martínez MD  Department of Hospital Medicine   [facility]

## 2023-12-06 NOTE — PLAN OF CARE
Nutrition Plan of Care:    Recommendations     1. Advance oral diet when appropriate to diabetic, cardiac oral diet    2. Recommend mandi BID to promote wound healing   3. Recommend commercial beverages: Boost glucose control BID if po intake remains <50%   4. Collaboration with medical providers     Goals: Patient to advance with nutrition therapy prior to RD follow up.  Nutrition Goal Status: new  Communication of RD Recs: other (comment) (poc,)     Assessment and Plan     Nutrition Problem  Increased nutritional needs     Related to (etiology):   Conditions associated with medical diagnoses: Cardiac disease and heart failure  Decreased appetite      Signs and Symptoms (as evidenced by):   Unhealing wounds (multiple locations on body)  Decreased po intake      Interventions (treatment strategy):  Commercial beverages  Collaboration with medical providers     Nutrition Diagnosis Status:   New           Malnutrition Assessment  Skin (Micronutrient): cuts unhealed, wounds unhealed   Micronutrient Evaluation Summary: suspected deficiency  Micronutrient Evaluation Comments: anemia             Connie Christina, MS, RDN, LDN

## 2023-12-06 NOTE — ASSESSMENT & PLAN NOTE
"73M admitted 11/27 from NH for b/l wounds. 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 Wound swab of R ulcer with MRSA. s/p bone biopsy and debridement, cultures + s aureus, path pending.  ID consulted for "heel osteo with mrsa infection "    Needs 6 weeks of abx for osteomyelitis of foot.  Also with large left prepatellar effusion s/p dry tap- ortho following and suspect 2/2 arthritis.      Recommendations:   - f/u results fo LLE angio  - continue vanc . Pharm to dose. Anticipate 6 wks  - stop flagyl -ordered  - f/u susceptibilities of s aureus   - f/u pending path/cultures  - continue local wound care  - will need line placement for IV abx to be given at NH  "

## 2023-12-06 NOTE — PLAN OF CARE
To go to angiogram today.      Will need SNF when returning to [facility]. [place] sent packet through [place], hnp, op note, podiatry, ortho, ID, and hospitalist progress notes. Sent message to E[place]ie in admissions at [facility] regarding SNF needed upon return and to submit for auth.   no

## 2023-12-06 NOTE — PLAN OF CARE
Problem: Adult Inpatient Plan of Care  Goal: Plan of Care Review  Outcome: Ongoing, Progressing  Goal: Patient-Specific Goal (Individualized)  Outcome: Ongoing, Progressing  Goal: Absence of Hospital-Acquired Illness or Injury  Outcome: Ongoing, Progressing  Goal: Optimal Comfort and Wellbeing  Outcome: Ongoing, Progressing  Goal: Readiness for Transition of Care  Outcome: Ongoing, Progressing     Problem: Skin Injury Risk Increased  Goal: Skin Health and Integrity  Outcome: Ongoing, Progressing     Problem: Diabetes Comorbidity  Goal: Blood Glucose Level Within Targeted Range  Outcome: Ongoing, Progressing     Problem: Adjustment to Illness (Sepsis/Septic Shock)  Goal: Optimal Coping  Outcome: Ongoing, Progressing     Problem: Bleeding (Sepsis/Septic Shock)  Goal: Absence of Bleeding  Outcome: Ongoing, Progressing     Problem: Glycemic Control Impaired (Sepsis/Septic Shock)  Goal: Blood Glucose Level Within Desired Range  Outcome: Ongoing, Progressing     Problem: Infection Progression (Sepsis/Septic Shock)  Goal: Absence of Infection Signs and Symptoms  Outcome: Ongoing, Progressing     Problem: Nutrition Impaired (Sepsis/Septic Shock)  Goal: Optimal Nutrition Intake  Outcome: Ongoing, Progressing     Problem: Fluid and Electrolyte Imbalance (Acute Kidney Injury/Impairment)  Goal: Fluid and Electrolyte Balance  Outcome: Ongoing, Progressing     Problem: Oral Intake Inadequate (Acute Kidney Injury/Impairment)  Goal: Optimal Nutrition Intake  Outcome: Ongoing, Progressing     Problem: Renal Function Impairment (Acute Kidney Injury/Impairment)  Goal: Effective Renal Function  Outcome: Ongoing, Progressing     Problem: Impaired Wound Healing  Goal: Optimal Wound Healing  Outcome: Ongoing, Progressing     Problem: Infection  Goal: Absence of Infection Signs and Symptoms  Outcome: Ongoing, Progressing

## 2023-12-06 NOTE — PROGRESS NOTES
[facility] - Med Surg  Adult Nutrition  Progress Note    SUMMARY       Recommendations    1. Advance oral diet when appropriate to diabetic, cardiac oral diet    2. Recommend mandi BID to promote wound healing   3. Recommend commercial beverages: Boost glucose control BID if po intake remains <50%   4. Collaboration with medical providers    Goals: Patient to advance with nutrition therapy prior to RD follow up.  Nutrition Goal Status: new  Communication of RD Recs: other (comment) (poc,)    Assessment and Plan    Nutrition Problem  Increased nutritional needs    Related to (etiology):   Conditions associated with medical diagnoses: Cardiac disease and heart failure  Decreased appetite     Signs and Symptoms (as evidenced by):   Unhealing wounds (multiple locations on body)  Decreased po intake     Interventions (treatment strategy):  Commercial beverages  Collaboration with medical providers    Nutrition Diagnosis Status:   New         Malnutrition Assessment     Skin (Micronutrient): cuts unhealed, wounds unhealed   Micronutrient Evaluation Summary: suspected deficiency  Micronutrient Evaluation Comments: anemia                             Reason for Assessment    Reason For Assessment: low Magdiel, length of stay    Diagnosis:  (osteomyelitis of foot)  Patient Active Problem List   Diagnosis    Abnormal EKG    Epigastric abdominal pain    Sepsis    Abdominal pain    Fever    Intractable vomiting with nausea    Acute kidney injury superimposed on CKD    Essential hypertension    Dyslipidemia    Type 2 diabetes mellitus with kidney complication, with long-term current use of insulin    Anemia    GERD (gastroesophageal reflux disease)    Hypokalemia    Alcohol abuse    Left knee pain    NSTEMI (non-ST elevated myocardial infarction)    Stage 3b chronic kidney disease (CKD)    Hypomagnesemia    BPH (benign prostatic hyperplasia)    Severe aortic valve stenosis    Chest pain    Paroxysmal atrial flutter    Acute on  chronic diastolic congestive heart failure    Elevated serum creatinine    CKD (chronic kidney disease), stage IV    Fracture of one rib, right side, initial encounter for closed fracture    Acute on chronic diastolic (congestive) heart failure    UTI (urinary tract infection)    Anasarca    Severe malnutrition    Acute gout of left knee    Debility Due to left knee pain and effusion due to gout? and/or injury    Multiple thyroid nodules    Overweight (BMI 25.0-29.9)    Elevated troponin    Skin rash    Acute encephalopathy    Falls    COVID-19 virus infection    Multiple wounds    Effusion of left knee    Discharge planning issues    ACP (advance care planning)    Left atrial mass    Urinary retention    Chronic diastolic heart failure    Atherosclerosis of native artery of extremity    Osteomyelitis of foot       Relevant Medical History:   Past Medical History:   Diagnosis Date    Acute congestive heart failure 3/20/2020    Alcohol abuse     Arthritis     Asthma attack 11/24/2021    Coronary artery disease     Diabetes mellitus     Hyperlipemia     Hypertension     Multiple thyroid nodules 6/14/2023    Rhabdomyolysis        Interdisciplinary Rounds: did not attend (RD remote)    General Information Comments:   12/6/2023: Patient is currently npo for procedure.  Patient admitted from nursing home with multiple wounds and altered skin integrity.  RD to recommend mandi BID once npo status advances.  Prior to NPO status, patient with meal consumptions noted at 25%.  Labs reviewed.  NKFA.  LBM: 12/6/2023.  RD to continue to monitor npo status and po intake.    Nutrition Discharge Planning: Pending medical course  Patient to continue on a diabetic, cardiac oral diet post discharge    Nutrition Risk Screen    Nutrition Risk Screen: large or nonhealing wound, burn or pressure injury    Nutrition/Diet History    Spiritual, Cultural Beliefs, Mormonism Practices, Values that Affect Care: no  Food Allergies: NKFA  Factors  "Affecting Nutritional Intake: NPO    Anthropometrics    Temp: 97.8 °F (36.6 °C)  Height Method: Stated  Height: 5' 11" (180.3 cm)  Height (inches): 71 in  Weight Method: Bed Scale  Weight: 82.7 kg (182 lb 5.1 oz)  Weight (lb): 182.32 lb  Ideal Body Weight (IBW), Male: 172 lb  % Ideal Body Weight, Male (lb): 106 %  BMI (Calculated): 25.4  BMI Grade: 25 - 29.9 - overweight       Lab/Procedures/Meds    Pertinent Labs Reviewed: reviewed  BMP  Lab Results   Component Value Date     12/06/2023    K 3.1 (L) 12/06/2023    CL 96 12/06/2023    CO2 25 12/06/2023    BUN 48 (H) 12/06/2023    CREATININE 2.3 (H) 12/06/2023    CALCIUM 10.8 (H) 12/06/2023    ANIONGAP 16 12/06/2023    EGFRNORACEVR 29 (A) 12/06/2023     Lab Results   Component Value Date    HGBA1C 5.5 09/20/2023     Lab Results   Component Value Date    CALCIUM 10.8 (H) 12/06/2023    PHOS 2.9 11/28/2023       Pertinent Medications Reviewed: reviewed  Scheduled Meds:   amiodarone  200 mg Oral Daily    aspirin  81 mg Oral Daily    atorvastatin  40 mg Oral QHS    ferrous sulfate  1 tablet Oral BID    folic acid  1 mg Oral Daily    furosemide  40 mg Oral Daily    insulin aspart U-100  2 Units Subcutaneous TIDWM    insulin detemir U-100  8 Units Subcutaneous BID    lactulose  20 g Oral TID    metOLazone  5 mg Oral Daily    metoprolol succinate  12.5 mg Oral Daily    polyethylene glycol  17 g Oral Daily    senna-docusate 8.6-50 mg  1 tablet Oral BID    tamsulosin  0.4 mg Oral Daily     Continuous Infusions:   lactated ringers 75 mL/hr at 12/06/23 1102     PRN Meds:.acetaminophen, acetaminophen, cadexomer iodine, dextrose 10%, dextrose 10%, glucagon (human recombinant), glucose, glucose, HYDROcodone-acetaminophen, HYDROmorphone, insulin aspart U-100, melatonin, naloxone, ondansetron, ondansetron, oxyCODONE-acetaminophen, potassium bicarbonate, potassium bicarbonate, simethicone, sodium chloride 0.9%, Pharmacy to dose Vancomycin consult **AND** vancomycin - pharmacy to " dose    Physical Findings/Assessment         Estimated/Assessed Needs    Weight Used For Calorie Calculations: 82.7 kg (182 lb 5.1 oz)  Energy Calorie Requirements (kcal): 25-30kcals/kg (2067-2481kcals/day)  Energy Need Method: Kcal/kg  Protein Requirements: 0.8-1.0g/kg, 66-82g/day (ckd and multiple wounds)  Weight Used For Protein Calculations: 82.7 kg (182 lb 5.1 oz)  Fluid Requirements (mL): 1000+output or per md order  Estimated Fluid Requirement Method: other (see comments) (ckd)  RDA Method (mL): 25  CHO Requirement: 250      Nutrition Prescription Ordered    Current Diet Order: NPO  Oral Nutrition Supplement: Donn BID recommended once NPO status advances    Evaluation of Received Nutrient/Fluid Intake    % Kcal Needs: NPO  % Protein Needs: NPO  I/O: -2.9L since admit  Energy Calories Required: not meeting needs  Protein Required: not meeting needs  Fluid Required: not meeting needs  Comments: LBM: 12/6/2023  Tolerance: other (see comments) (npo)  % Intake of Estimated Energy Needs: Other: npo  % Meal Intake: NPO    Nutrition Risk    Level of Risk/Frequency of Follow-up: moderate (follow up: 1-2x per week)     Monitor and Evaluation    Food and Nutrient Intake: energy intake, food and beverage intake  Food and Nutrient Adminstration: diet order  Knowledge/Beliefs/Attitudes: beliefs and attitudes  Physical Activity and Function: factors affecting access to physical activity, nutrition-related ADLs and IADLs  Anthropometric Measurements: body mass index, weight change, weight, height/length  Biochemical Data, Medical Tests and Procedures: electrolyte and renal panel, gastrointestinal profile, glucose/endocrine profile, inflammatory profile, lipid profile  Nutrition-Focused Physical Findings: skin     Nutrition Follow-Up    RD Follow-up?: Yes  Connie Christina, MS, RDN, LDN

## 2023-12-06 NOTE — PROGRESS NOTES
"[facility]  Infectious Disease  Progress Note    Patient Name: Chato Bowie  MRN: [id number]  Admission Date: 11/27/2023  Length of Stay: 9 days  Attending Physician: Nhung Martínez MD  Primary Care Provider: Irlanda Valenzuela -    Isolation Status: Contact  Assessment/Plan:      ID  * Osteomyelitis of foot  73M admitted 11/27 from NH for b/l wounds. 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 Wound swab of R ulcer with MRSA. s/p bone biopsy and debridement, cultures + s aureus, path pending.  ID consulted for "heel osteo with mrsa infection "    Needs 6 weeks of abx for osteomyelitis of foot.  Also with large left prepatellar effusion s/p dry tap- ortho following and suspect 2/2 arthritis.      Recommendations:   - f/u results fo LLE angio  - continue vanc . Pharm to dose. Anticipate 6 wks  - stop flagyl -ordered  - f/u susceptibilities of s aureus   - f/u pending path/cultures  - continue local wound care  - will need line placement for IV abx to be given at NH        Anticipated Disposition: tbd    Thank you for your consult. I will follow-up with patient. Please contact us if you have any additional questions.    Susanne Dumont MD  Infectious Disease  [facility] S[facility]    Subjective:     Principal Problem:Osteomyelitis of foot    HPI: 73M admitted 11/27 from NH for wounds. Pt reports his L knee has been aching for the last 3-4 months. Also reports b/l foot wounds and some recent swelling of leg and "I barely can walk". Denies f/c. Denies abx allergies. Denies indwelling hardware. There was plan for angio today, but cancelled because intervention not thought to be beneficial at this time.     Podiatry now following- Plan for OR debridement, bone biopsy tomorrow.       MRI   1. Osteomyelitis of the bilateral posterolateral calcaneus.  2. Osteomyelitis of the left 5th metatarsal head and possibly right 5th metatarsal head.      R foot wound cultures-          Aerobic culture " "[[id number]] (Abnormal)  Collected: 11/28/23 1347   Order Status: Completed Specimen: Wound from Foot, Right Updated: 11/30/23 1041    Aerobic Bacterial Culture Results called to and read back by:CHARITY STAFFORD 11/30/2023  10:39     METHICILLIN RESISTANT STAPHYLOCOCCUS AUREUS  Many   Abnormal    Susceptibility     Methicillin resistant Staphylococcus aureus     CULTURE, AEROBIC  (SPECIFY SOURCE)     Clindamycin <=0.5 mcg/mL Sensitive     Erythromycin >4 mcg/mL Resistant     Oxacillin >2 mcg/mL Resistant     Penicillin >8 mcg/mL Resistant     Tetracycline <=4 mcg/mL Sensitive     Trimeth/Sulfa <=0.5/9.5 m... Sensitive     Vancomycin 2 mcg/mL Sensitive                   On vanc/ceftriaxone     ID consulted for "heel osteo with mrsa infection "  Interval History: No acute events overnight. No new complaints.     Review of Systems   Constitutional:  Negative for chills and fatigue.   Respiratory:  Negative for cough and shortness of breath.    Skin:  Positive for wound.   All other systems reviewed and are negative.    Objective:     Vital Signs (Most Recent):  Temp: 98.1 °F (36.7 °C) (12/06/23 1455)  Pulse: 67 (12/06/23 1525)  Resp: 10 (12/06/23 1525)  BP: 138/70 (12/06/23 1525)  SpO2: 100 % (12/06/23 1525) Vital Signs (24h Range):  Temp:  [96.1 °F (35.6 °C)-98.1 °F (36.7 °C)] 98.1 °F (36.7 °C)  Pulse:  [62-74] 67  Resp:  [10-23] 10  SpO2:  [96 %-100 %] 100 %  BP: (111-158)/(53-81) 138/70     Weight: 82.7 kg (182 lb 5.1 oz)  Body mass index is 25.43 kg/m².    Estimated Creatinine Clearance: 30.5 mL/min (A) (based on SCr of 2.3 mg/dL (H)).     Physical Exam  Vitals reviewed.   HENT:      Head: Normocephalic and atraumatic.   Cardiovascular:      Rate and Rhythm: Normal rate and regular rhythm.      Heart sounds: Murmur heard.   Pulmonary:      Effort: Pulmonary effort is normal.      Breath sounds: Normal breath sounds.   Abdominal:      General: Abdomen is flat. There is no distension.      Palpations: Abdomen is " soft.      Tenderness: There is no abdominal tenderness.   Musculoskeletal:         General: Normal range of motion.   Skin:     Comments: Clean dressing on b/l feet  L leg and knee edematous. L knee warm. ROM intact.   Neurological:      General: No focal deficit present.      Mental Status: He is alert and oriented to person, place, and time.          Significant Labs: Blood Culture:   Recent Labs   Lab 09/21/23  1029 09/23/23  0929 09/23/23  0930 11/27/23  1512 11/27/23  1520   LABBLOO No Growth after 4 days.  No Growth after 4 days. No Growth after 4 days. No Growth after 4 days. Gram stain aer bottle: Gram positive cocci in clusters resembling Staph  Results called to and read back by:Kathleen everett 11/28/2023  13:53  COAGULASE-NEGATIVE STAPHYLOCOCCUS SPECIES  Organism is a probable contaminant  * No Growth after 4 days.       Wound Culture:   Recent Labs   Lab 11/28/23  1347 12/01/23  1123   LABAERO Results called to and read back by:CHARITY STAFFORD 11/30/2023  10:39  METHICILLIN RESISTANT STAPHYLOCOCCUS AUREUS  Many  * STAPHYLOCOCCUS AUREUS  From broth only  Susceptibility pending  *  STAPHYLOCOCCUS AUREUS  From broth only  *  STAPHYLOCOCCUS AUREUS  From broth only  *  No growth       All pertinent labs within the past 24 hours have been reviewed.    Significant Imaging: I have reviewed all pertinent imaging results/findings within the past 24 hours.

## 2023-12-06 NOTE — PROGRESS NOTES
Pharmacokinetic Assessment Follow Up: IV Vancomycin    Vancomycin serum concentration assessment(s):    The trough level was drawn correctly and can be used to guide therapy at this time. The measurement is above the desired definitive target range of 10 to 20 mcg/mL.    Vancomycin Regimen Plan:    Re-dose when the random level is less than 20 mcg/mL, next level to be drawn at 0400 on 12/7    Drug levels (last 3 results):  Recent Labs   Lab Result Units 12/04/23 0446 12/05/23 0433 12/06/23 0426   Vancomycin, Random ug/mL 16.6 17.3 20.7       Pharmacy will continue to follow and monitor vancomycin.    Please contact pharmacy at extension [phone] for questions regarding this assessment.    Thank you for the consult,   Ketan Hernandez       Patient brief summary:  Chato Bowie is a 73 y.o. male initiated on antimicrobial therapy with IV Vancomycin for treatment of skin & soft tissue infection    Drug Allergies:   Review of patient's allergies indicates:  No Known Allergies    Actual Body Weight:   82.7 kg    Renal Function:   Estimated Creatinine Clearance: 30.5 mL/min (A) (based on SCr of 2.3 mg/dL (H)).,     Dialysis Method (if applicable):  N/A    CBC (last 72 hours):  Recent Labs   Lab Result Units 12/04/23 0446 12/05/23 0433   WBC K/uL 7.22 8.50   Hemoglobin g/dL 8.0* 8.9*   Hematocrit % 27.1* 30.2*   Platelets K/uL 265 285   Gran % % 62.7 61.8   Lymph % % 14.5* 15.4*   Mono % % 15.7* 15.9*   Eosinophil % % 5.5 4.9   Basophil % % 1.2 1.3   Differential Method  Automated Automated       Metabolic Panel (last 72 hours):  Recent Labs   Lab Result Units 12/04/23 0446 12/05/23 0433   Sodium mmol/L 136 135*   Potassium mmol/L 3.2* 3.6   Chloride mmol/L 91* 94*   CO2 mmol/L 28 27   Glucose mg/dL 87 62*   BUN mg/dL 55* 52*   Creatinine mg/dL 2.3* 2.3*   Magnesium mg/dL 2.0 2.0       Vancomycin Administrations:  vancomycin given in the last 96 hours                     vancomycin (VANCOCIN) 500 mg in dextrose 5  % in water (D5W) 100 mL IVPB (MB+) ()  Restarted 12/05/23 1015     500 mg New Bag  0920    vancomycin (VANCOCIN) 500 mg in dextrose 5 % in water (D5W) 100 mL IVPB (MB+) (mg) 500 mg New Bag 12/04/23 0927                    Microbiologic Results:  Microbiology Results (last 7 days)       Procedure Component Value Units Date/Time    Aerobic culture [[id number]]  (Abnormal) Collected: 12/01/23 1123    Order Status: Completed Specimen: Bone from Foot, Left Updated: 12/05/23 0801     Aerobic Bacterial Culture STAPHYLOCOCCUS AUREUS  From broth only      Narrative:      Right 5th metatarsal    Aerobic culture [[id number]]  (Abnormal) Collected: 12/01/23 1123    Order Status: Completed Specimen: Bone from Foot, Left Updated: 12/05/23 0800     Aerobic Bacterial Culture STAPHYLOCOCCUS AUREUS  From broth only      Narrative:      Right heel    Aerobic culture [[id number]]  (Abnormal) Collected: 12/01/23 1123    Order Status: Completed Specimen: Bone from Foot, Left Updated: 12/05/23 0756     Aerobic Bacterial Culture STAPHYLOCOCCUS AUREUS  From broth only  Susceptibility pending      Narrative:      Left 5th metatarsal    Aerobic culture [[id number]] Collected: 12/01/23 1123    Order Status: Completed Specimen: Bone from Foot, Left Updated: 12/05/23 0754     Aerobic Bacterial Culture No growth    Narrative:      Left heel    Culture, Anaerobe [[id number]] Collected: 12/01/23 1123    Order Status: Completed Specimen: Bone from Foot, Left Updated: 12/05/23 0526     Anaerobic Culture No anaerobes isolated    Narrative:      Right 5th metatarsal    Culture, Anaerobe [[id number]] Collected: 12/01/23 1123    Order Status: Completed Specimen: Bone from Foot, Left Updated: 12/05/23 0525     Anaerobic Culture No anaerobes isolated    Narrative:      Right heel    Culture, Anaerobe [[id number]] Collected: 12/01/23 1123    Order Status: Completed Specimen: Bone from Foot, Left Updated: 12/05/23 0525     Anaerobic Culture No anaerobes  isolated    Narrative:      Left 5th metatarsal    Culture, Anaerobe [[id number]] Collected: 12/01/23 1123    Order Status: Completed Specimen: Bone from Foot, Left Updated: 12/05/23 0524     Anaerobic Culture No anaerobes isolated    Narrative:      Left heel    AFB Culture & Smear [[id number]] Collected: 12/01/23 1123    Order Status: Completed Specimen: Bone from Foot, Right Updated: 12/04/23 1534     AFB Culture & Smear Culture in progress     AFB CULTURE STAIN No acid fast bacilli seen.    Narrative:      Right heel    AFB Culture & Smear [[id number]] Collected: 12/01/23 1123    Order Status: Completed Specimen: Bone from Foot, Left Updated: 12/04/23 1534     AFB Culture & Smear Culture in progress     AFB CULTURE STAIN No acid fast bacilli seen.    Narrative:      Left heel    AFB Culture & Smear [[id number]] Collected: 12/01/23 1123    Order Status: Completed Specimen: Bone from Foot, Left Updated: 12/04/23 1534     AFB Culture & Smear Culture in progress     AFB CULTURE STAIN No acid fast bacilli seen.    Narrative:      Left 5th metatarsal    AFB Culture & Smear [[id number]] Collected: 12/01/23 1123    Order Status: Completed Specimen: Bone from Foot, Right Updated: 12/04/23 1534     AFB Culture & Smear Culture in progress     AFB CULTURE STAIN No acid fast bacilli seen.    Narrative:      Right 5th metatarsal    Culture, Anaerobe [[id number]] Collected: 11/28/23 1347    Order Status: Completed Specimen: Wound from Foot, Right Updated: 12/02/23 0855     Anaerobic Culture No anaerobes isolated    Blood culture #1 [[id number]] Collected: 11/27/23 1520    Order Status: Completed Specimen: Blood from Peripheral, Antecubital, Left Updated: 12/01/23 1703     Blood Culture, Routine No Growth after 4 days.    Narrative:      Blood Culture #1    Gram stain [[id number]] Collected: 12/01/23 1123    Order Status: Completed Specimen: Bone from Foot, Left Updated: 12/01/23 1429     Gram Stain Result Few WBC's      No  organisms seen    Narrative:      Right 5th metatarsal    Gram stain [[id number]] Collected: 12/01/23 1123    Order Status: Completed Specimen: Bone from Foot, Left Updated: 12/01/23 1428     Gram Stain Result Rare WBC's      No organisms seen    Narrative:      Right heel    Gram stain [[id number]] Collected: 12/01/23 1123    Order Status: Completed Specimen: Bone from Foot, Left Updated: 12/01/23 1428     Gram Stain Result Rare WBC's      No organisms seen    Narrative:      Left 5th metatarsal    Gram stain [[id number]] Collected: 12/01/23 1123    Order Status: Completed Specimen: Bone from Foot, Left Updated: 12/01/23 1427     Gram Stain Result Rare WBC's      No organisms seen    Narrative:      Left heel    Fungus culture [[id number]] Collected: 12/01/23 1123    Order Status: No result Specimen: Bone from Foot, Left Updated: 12/01/23 1220    Fungus culture [[id number]] Collected: 12/01/23 1123    Order Status: Sent Specimen: Bone from Foot, Left Updated: 12/01/23 1216    Fungus culture [[id number]] Collected: 12/01/23 1123    Order Status: Sent Specimen: Bone from Foot, Left Updated: 12/01/23 1213    Fungus culture [[id number]] Collected: 12/01/23 1123    Order Status: Sent Specimen: Bone from Foot, Left Updated: 12/01/23 1207    Fungus culture [[id number]]     Order Status: No result Specimen: Bone from Foot, Left     Blood culture #2 [[id number]]  (Abnormal) Collected: 11/27/23 1512    Order Status: Completed Specimen: Blood from Peripheral, Antecubital, Left Updated: 12/01/23 0537     Blood Culture, Routine Gram stain aer bottle: Gram positive cocci in clusters resembling Staph      Results called to and read back by:Kathleen Young east 11/28/2023      13:53      COAGULASE-NEGATIVE STAPHYLOCOCCUS SPECIES  Organism is a probable contaminant      Narrative:      Blood Culture #2    Aerobic culture [[id number]]  (Abnormal)  (Susceptibility) Collected: 11/28/23 1347    Order Status: Completed Specimen:  Wound from Foot, Right Updated: 11/30/23 1041     Aerobic Bacterial Culture Results called to and read back by:CHARITY STAFFORD 11/30/2023  10:39      METHICILLIN RESISTANT STAPHYLOCOCCUS AUREUS  Many      Urine culture [[id number]]  (Abnormal)  (Susceptibility) Collected: 11/27/23 1855    Order Status: Completed Specimen: Urine Updated: 11/29/23 0812     Urine Culture, Routine PROTEUS MIRABILIS  10,000 - 49,999 cfu/ml      Narrative:      Specimen Source->Urine

## 2023-12-06 NOTE — NURSING
Advance Care Planning   Advance Care Planning Note  Ambulatory Spiritual Care Services    Date:  4/13/2022    Received request from Other: John Hair. Consultation conversation participants:   Patient who understands ACP conversation     Goals of ACP Conversation:  Discuss advance care planning documents  Facilitate a discussion related to patient's goals of care as they align with the patient's values and beliefs. Health Care Decision Makers:    No healthcare decision makers have been documented. Click here to complete [address] including selection of the Healthcare Decision Maker Relationship (ie \"Primary\")   Summary:  Completed New Documents  No Decision Maker named by patient at this time    Advance Care Planning Documents (Patient Wishes)  Currently on file:   Healthcare Power of /Advance Directive Appointment of [address]  Living Will/Advance Directive    Assessment:   Patient shared that she does not have any POA because her daughter is disabled and has no other family. She recently moved from [place] so she doesn't have many friends here. We documented this in her HC-POA as well as her care preferences in her living will. Most of her preferences are informed by her experience with her father's end of life care. Interventions:  Provided education on documents for clarity and greater understanding  Discussed and provided education on state decision maker hierarchy  Assisted in the completion of documents according to patient's wishes at this time    Care Preferences Communicated:     Hospitalization:  If the patient's health worsens and it becomes clear that the chance of recovery is unlikely,     the patient prefers comfort-focused treatment without hospitalization. Outcomes:  New advance directive completed.   Returned original document(s) to patient, as well as copies for distribution to appointed agents  Copy of advance directive given to Pt back to unit from IR by bed via transport. IV access in place, saline locked. NAD or pain noted.     staff to scan into medical record.     Patient / Healthcare Decision Maker Instructions:  Review completed ACP document(s) and update, if needed, with changes health or future preferences    Electronically signed by Ynes Aguilar ACP Specialist on 4/13/2022 at 2:20 PM.

## 2023-12-06 NOTE — SUBJECTIVE & OBJECTIVE
Interval History: Pt noted to be afebrile and hemodynamically stable. Stable respiratory status.  Patient did have a bowel movement.  Continue stool softeners for now.  Per RN, patient has been withdrawn and finding.  ID, Podiatry, vascular surgery following.  Cefepime stopped, continue vancomycin, Flagyl per ID recommendations.  PICC line ordered.  Peripheral angiogram today by vascular surgery.  Patient will need SNF once final antibiotic decided ID.  Continue wound care.    Review of Systems   Constitutional:  Positive for activity change and fatigue. Negative for fever.   Respiratory:  Negative for cough and shortness of breath.    Cardiovascular:  Negative for chest pain.   Gastrointestinal:  Positive for abdominal pain and constipation.   Musculoskeletal:  Positive for arthralgias.   Neurological:  Negative for dizziness and headaches.   Psychiatric/Behavioral:  Negative for confusion.    All other systems reviewed and are negative.    Objective:     Vital Signs (Most Recent):  Temp: 97.8 °F (36.6 °C) (12/06/23 1153)  Pulse: 70 (12/06/23 1127)  Resp: 20 (12/06/23 1127)  BP: (!) 111/53 (12/06/23 1127)  SpO2: 96 % (12/06/23 1127) Vital Signs (24h Range):  Temp:  [96.1 °F (35.6 °C)-98 °F (36.7 °C)] 97.8 °F (36.6 °C)  Pulse:  [62-70] 70  Resp:  [17-20] 20  SpO2:  [96 %-100 %] 96 %  BP: (111-158)/(53-72) 111/53     Weight: 82.7 kg (182 lb 5.1 oz)  Body mass index is 25.43 kg/m².    Intake/Output Summary (Last 24 hours) at 12/6/2023 1400  Last data filed at 12/6/2023 1342  Gross per 24 hour   Intake 909.98 ml   Output 400 ml   Net 509.98 ml           Physical Exam  Vitals and nursing note reviewed.   Constitutional:       Appearance: Normal appearance.   HENT:      Head: Normocephalic and atraumatic.   Eyes:      Extraocular Movements: Extraocular movements intact.      Pupils: Pupils are equal, round, and reactive to light.   Cardiovascular:      Rate and Rhythm: Normal rate and regular rhythm.   Pulmonary:       Effort: Pulmonary effort is normal. No respiratory distress.   Abdominal:      General: Abdomen is flat. There is no distension.   Musculoskeletal:         General: Normal range of motion.      Cervical back: Normal range of motion.      Comments: Foot wound   Neurological:      General: No focal deficit present.      Mental Status: He is alert and oriented to person, place, and time.   Psychiatric:         Mood and Affect: Mood normal.         Behavior: Behavior normal.             Significant Labs: All pertinent labs within the past 24 hours have been reviewed.  CBC:   Recent Labs   Lab 12/05/23  0433 12/06/23  0958   WBC 8.50 9.97   HGB 8.9* 10.3*   HCT 30.2* 34.7*    313       CMP:   Recent Labs   Lab 12/05/23  0433 12/06/23  0958   * 137   K 3.6 3.1*   CL 94* 96   CO2 27 25   GLU 62* 74   BUN 52* 48*   CREATININE 2.3* 2.3*   CALCIUM 10.0 10.8*   ANIONGAP 14 16         Significant Imaging: I have reviewed all pertinent imaging results/findings within the past 24 hours.

## 2023-12-06 NOTE — PLAN OF CARE
Pt arrived to IR for LLE angiogram,. Pt oriented to unit and staff. Plan of care reviewed with patient, patient nonverbal with staff. Dr Yeboah and IR team spoke to pt's family and obtained consent. Comfort measures utilized. Pt safely transferred from stretcher to procedural table. Fall risk reviewed with patient, fall risk interventions maintained. Safety strap applied, positioner pillows utilized to minimize pressure points. Blankets applied. Pt prepped and draped utilizing standard sterile technique. Patient placed on continuous monitoring, as required by sedation policy. Timeouts completed utilizing standard universal time-out, per department and facility policy. RN to remain at bedside, continuous monitoring maintained. See flow sheets for monitoring, medication administration, and updates.

## 2023-12-06 NOTE — SUBJECTIVE & OBJECTIVE
Interval History: Pt noted to be afebrile and hemodynamically stable. Stable respiratory status. Angiogram in AM. Has abdominal pain, KUB with stool burden. Stool softeneres ordered.     Review of Systems   Constitutional:  Positive for activity change and fatigue. Negative for fever.   Respiratory:  Negative for cough and shortness of breath.    Cardiovascular:  Negative for chest pain.   Gastrointestinal:  Positive for abdominal pain and constipation.   Musculoskeletal:  Positive for arthralgias.   Neurological:  Negative for dizziness and headaches.   Psychiatric/Behavioral:  Negative for confusion.    All other systems reviewed and are negative.    Objective:     Vital Signs (Most Recent):  Temp: 98 °F (36.7 °C) (12/05/23 2355)  Pulse: 69 (12/05/23 2355)  Resp: 18 (12/05/23 2355)  BP: (!) 158/72 (12/05/23 2355)  SpO2: 97 % (12/05/23 2355) Vital Signs (24h Range):  Temp:  [97.4 °F (36.3 °C)-98 °F (36.7 °C)] 98 °F (36.7 °C)  Pulse:  [57-70] 69  Resp:  [18-19] 18  SpO2:  [97 %-100 %] 97 %  BP: (126-158)/(62-72) 158/72     Weight: 82.7 kg (182 lb 5.1 oz)  Body mass index is 25.43 kg/m².    Intake/Output Summary (Last 24 hours) at 12/6/2023 0228  Last data filed at 12/5/2023 1828  Gross per 24 hour   Intake 1517.56 ml   Output --   Net 1517.56 ml           Physical Exam  Vitals and nursing note reviewed.   Constitutional:       Appearance: Normal appearance.   HENT:      Head: Normocephalic and atraumatic.   Eyes:      Extraocular Movements: Extraocular movements intact.      Pupils: Pupils are equal, round, and reactive to light.   Cardiovascular:      Rate and Rhythm: Normal rate and regular rhythm.   Pulmonary:      Effort: Pulmonary effort is normal. No respiratory distress.   Abdominal:      General: Abdomen is flat. There is no distension.   Musculoskeletal:         General: Normal range of motion.      Cervical back: Normal range of motion.      Comments: Foot wound   Neurological:      General: No focal  deficit present.      Mental Status: He is alert and oriented to person, place, and time.   Psychiatric:         Mood and Affect: Mood normal.         Behavior: Behavior normal.             Significant Labs: All pertinent labs within the past 24 hours have been reviewed.  CBC:   Recent Labs   Lab 12/04/23 0446 12/05/23 0433   WBC 7.22 8.50   HGB 8.0* 8.9*   HCT 27.1* 30.2*    285       CMP:   Recent Labs   Lab 12/04/23 0446 12/05/23 0433    135*   K 3.2* 3.6   CL 91* 94*   CO2 28 27   GLU 87 62*   BUN 55* 52*   CREATININE 2.3* 2.3*   CALCIUM 9.6 10.0   ANIONGAP 17* 14         Significant Imaging: I have reviewed all pertinent imaging results/findings within the past 24 hours.

## 2023-12-06 NOTE — NURSING
Pt off the unit going to IR by bed via transport. IV access in place, saline locked. NAD or pain noted.

## 2023-12-06 NOTE — PLAN OF CARE
Procedure completed, pt tolerated well. 6Fr perclose deployed to L groin, hemostasis achieved at 1605. Patient to lay flat for 2 hours per Dr. Yeboah. No apparent distress noted. Dressing applied CDI. L DP: doppler. Patient to be transfer back to room. Report called to JOYCE Singh.

## 2023-12-06 NOTE — NURSING
Received report care assumed. Patient lying in bed resting, NAD noted. Safety precautions maintained.    [facility] [place] 4 [place]      12/5/2023       Skin assessed on: Q Shift      [] No Pressure Injuries Present    []Prevention Measures Documented    [x] Yes LDA  for Pressure Injury Previously documented     [] Yes New Pressure Injury Discovered   [] LDA for New Pressure Injury Added      Attending RN:  Ethel Hernandez LPN     Second RN:  Samantha Keith RN

## 2023-12-06 NOTE — ASSESSMENT & PLAN NOTE
Chronic, uncontrolled. Latest blood pressure and vitals reviewed-     Temp:  [96.1 °F (35.6 °C)-98 °F (36.7 °C)]   Pulse:  [62-70]   Resp:  [17-20]   BP: (111-158)/(53-72)   SpO2:  [96 %-100 %] .   Home meds for hypertension were reviewed and noted below.   Hypertension Medications               furosemide (LASIX) 80 MG tablet Take 1 tablet (80 mg total) by mouth 2 (two) times daily.    metOLazone (ZAROXOLYN) 5 MG tablet Take 1 tablet (5 mg total) by mouth once daily.    metoprolol succinate (TOPROL-XL) 25 MG 24 hr tablet Take 25 mg by mouth once daily.            While in the hospital, will manage blood pressure as follows; Continue home antihypertensive regimen    Will utilize p.r.n. blood pressure medication only if patient's blood pressure greater than 180/110 and he develops symptoms such as worsening chest pain or shortness of breath.

## 2023-12-06 NOTE — SUBJECTIVE & OBJECTIVE
Interval History: No acute events overnight. No new complaints.     Review of Systems   Constitutional:  Negative for chills and fatigue.   Respiratory:  Negative for cough and shortness of breath.    Skin:  Positive for wound.   All other systems reviewed and are negative.    Objective:     Vital Signs (Most Recent):  Temp: 98.1 °F (36.7 °C) (12/06/23 1455)  Pulse: 67 (12/06/23 1525)  Resp: 10 (12/06/23 1525)  BP: 138/70 (12/06/23 1525)  SpO2: 100 % (12/06/23 1525) Vital Signs (24h Range):  Temp:  [96.1 °F (35.6 °C)-98.1 °F (36.7 °C)] 98.1 °F (36.7 °C)  Pulse:  [62-74] 67  Resp:  [10-23] 10  SpO2:  [96 %-100 %] 100 %  BP: (111-158)/(53-81) 138/70     Weight: 82.7 kg (182 lb 5.1 oz)  Body mass index is 25.43 kg/m².    Estimated Creatinine Clearance: 30.5 mL/min (A) (based on SCr of 2.3 mg/dL (H)).     Physical Exam  Vitals reviewed.   HENT:      Head: Normocephalic and atraumatic.   Cardiovascular:      Rate and Rhythm: Normal rate and regular rhythm.      Heart sounds: Murmur heard.   Pulmonary:      Effort: Pulmonary effort is normal.      Breath sounds: Normal breath sounds.   Abdominal:      General: Abdomen is flat. There is no distension.      Palpations: Abdomen is soft.      Tenderness: There is no abdominal tenderness.   Musculoskeletal:         General: Normal range of motion.   Skin:     Comments: Clean dressing on b/l feet  L leg and knee edematous. L knee warm. ROM intact.   Neurological:      General: No focal deficit present.      Mental Status: He is alert and oriented to person, place, and time.          Significant Labs: Blood Culture:   Recent Labs   Lab 09/21/23  1029 09/23/23  0929 09/23/23  0930 11/27/23  1512 11/27/23  1520   LABBLOO No Growth after 4 days.  No Growth after 4 days. No Growth after 4 days. No Growth after 4 days. Gram stain aer bottle: Gram positive cocci in clusters resembling Staph  Results called to and read back by:Kathleen Young east 11/28/2023  13:53   COAGULASE-NEGATIVE STAPHYLOCOCCUS SPECIES  Organism is a probable contaminant  * No Growth after 4 days.       Wound Culture:   Recent Labs   Lab 11/28/23  1347 12/01/23  1123   LABAERO Results called to and read back by:CHARITY STAFFORD 11/30/2023  10:39  METHICILLIN RESISTANT STAPHYLOCOCCUS AUREUS  Many  * STAPHYLOCOCCUS AUREUS  From broth only  Susceptibility pending  *  STAPHYLOCOCCUS AUREUS  From broth only  *  STAPHYLOCOCCUS AUREUS  From broth only  *  No growth       All pertinent labs within the past 24 hours have been reviewed.    Significant Imaging: I have reviewed all pertinent imaging results/findings within the past 24 hours.

## 2023-12-07 PROBLEM — R29.898 WEAKNESS OF LEFT LEG: Status: ACTIVE | Noted: 2023-12-07

## 2023-12-07 LAB
ALBUMIN SERPL BCP-MCNC: 3 G/DL (ref 3.5–5.2)
ALP SERPL-CCNC: 88 U/L (ref 55–135)
ALT SERPL W/O P-5'-P-CCNC: 24 U/L (ref 10–44)
AMMONIA PLAS-SCNC: 21 UMOL/L (ref 10–50)
ANION GAP SERPL CALC-SCNC: 15 MMOL/L (ref 8–16)
AST SERPL-CCNC: 27 U/L (ref 10–40)
BACTERIA SPEC AEROBE CULT: ABNORMAL
BACTERIA SPEC AEROBE CULT: ABNORMAL
BASOPHILS # BLD AUTO: 0.09 K/UL (ref 0–0.2)
BASOPHILS NFR BLD: 1.1 % (ref 0–1.9)
BILIRUB SERPL-MCNC: 0.4 MG/DL (ref 0.1–1)
BUN SERPL-MCNC: 43 MG/DL (ref 8–23)
CALCIUM SERPL-MCNC: 10.4 MG/DL (ref 8.7–10.5)
CHLORIDE SERPL-SCNC: 98 MMOL/L (ref 95–110)
CO2 SERPL-SCNC: 24 MMOL/L (ref 23–29)
CREAT SERPL-MCNC: 2.5 MG/DL (ref 0.5–1.4)
DIFFERENTIAL METHOD: ABNORMAL
EOSINOPHIL # BLD AUTO: 0.3 K/UL (ref 0–0.5)
EOSINOPHIL NFR BLD: 3 % (ref 0–8)
ERYTHROCYTE [DISTWIDTH] IN BLOOD BY AUTOMATED COUNT: 16.4 % (ref 11.5–14.5)
EST. GFR  (NO RACE VARIABLE): 26 ML/MIN/1.73 M^2
GLUCOSE SERPL-MCNC: 91 MG/DL (ref 70–110)
HCT VFR BLD AUTO: 33 % (ref 40–54)
HGB BLD-MCNC: 9.8 G/DL (ref 14–18)
IMM GRANULOCYTES # BLD AUTO: 0.06 K/UL (ref 0–0.04)
IMM GRANULOCYTES NFR BLD AUTO: 0.7 % (ref 0–0.5)
LYMPHOCYTES # BLD AUTO: 0.9 K/UL (ref 1–4.8)
LYMPHOCYTES NFR BLD: 10.7 % (ref 18–48)
MCH RBC QN AUTO: 23 PG (ref 27–31)
MCHC RBC AUTO-ENTMCNC: 29.7 G/DL (ref 32–36)
MCV RBC AUTO: 77 FL (ref 82–98)
MONOCYTES # BLD AUTO: 0.8 K/UL (ref 0.3–1)
MONOCYTES NFR BLD: 9.7 % (ref 4–15)
NEUTROPHILS # BLD AUTO: 6.3 K/UL (ref 1.8–7.7)
NEUTROPHILS NFR BLD: 74.8 % (ref 38–73)
NRBC BLD-RTO: 0 /100 WBC
PLATELET # BLD AUTO: 273 K/UL (ref 150–450)
PMV BLD AUTO: 8.8 FL (ref 9.2–12.9)
POCT GLUCOSE: 100 MG/DL (ref 70–110)
POCT GLUCOSE: 123 MG/DL (ref 70–110)
POCT GLUCOSE: 128 MG/DL (ref 70–110)
POCT GLUCOSE: 138 MG/DL (ref 70–110)
POCT GLUCOSE: 140 MG/DL (ref 70–110)
POCT GLUCOSE: 95 MG/DL (ref 70–110)
POTASSIUM SERPL-SCNC: 3.8 MMOL/L (ref 3.5–5.1)
PROT SERPL-MCNC: 7.9 G/DL (ref 6–8.4)
RBC # BLD AUTO: 4.27 M/UL (ref 4.6–6.2)
SODIUM SERPL-SCNC: 137 MMOL/L (ref 136–145)
VANCOMYCIN SERPL-MCNC: 17.8 UG/ML
WBC # BLD AUTO: 8.39 K/UL (ref 3.9–12.7)

## 2023-12-07 PROCEDURE — 27000207 HC ISOLATION

## 2023-12-07 PROCEDURE — 85025 COMPLETE CBC W/AUTO DIFF WBC: CPT | Performed by: STUDENT IN AN ORGANIZED HEALTH CARE EDUCATION/TRAINING PROGRAM

## 2023-12-07 PROCEDURE — 25000003 PHARM REV CODE 250: Performed by: HOSPITALIST

## 2023-12-07 PROCEDURE — 63600175 PHARM REV CODE 636 W HCPCS: Performed by: HOSPITALIST

## 2023-12-07 PROCEDURE — 25000003 PHARM REV CODE 250: Performed by: STUDENT IN AN ORGANIZED HEALTH CARE EDUCATION/TRAINING PROGRAM

## 2023-12-07 PROCEDURE — G0426 PR INPT TELEHEALTH CONSULT 50M: ICD-10-PCS | Mod: 95,,, | Performed by: STUDENT IN AN ORGANIZED HEALTH CARE EDUCATION/TRAINING PROGRAM

## 2023-12-07 PROCEDURE — 11000001 HC ACUTE MED/SURG PRIVATE ROOM

## 2023-12-07 PROCEDURE — A4216 STERILE WATER/SALINE, 10 ML: HCPCS | Performed by: HOSPITALIST

## 2023-12-07 PROCEDURE — 80202 ASSAY OF VANCOMYCIN: CPT | Performed by: STUDENT IN AN ORGANIZED HEALTH CARE EDUCATION/TRAINING PROGRAM

## 2023-12-07 PROCEDURE — 36415 COLL VENOUS BLD VENIPUNCTURE: CPT | Performed by: STUDENT IN AN ORGANIZED HEALTH CARE EDUCATION/TRAINING PROGRAM

## 2023-12-07 PROCEDURE — 36569 INSJ PICC 5 YR+ W/O IMAGING: CPT

## 2023-12-07 PROCEDURE — 82140 ASSAY OF AMMONIA: CPT | Performed by: STUDENT IN AN ORGANIZED HEALTH CARE EDUCATION/TRAINING PROGRAM

## 2023-12-07 PROCEDURE — 80053 COMPREHEN METABOLIC PANEL: CPT | Performed by: STUDENT IN AN ORGANIZED HEALTH CARE EDUCATION/TRAINING PROGRAM

## 2023-12-07 PROCEDURE — 63600175 PHARM REV CODE 636 W HCPCS: Performed by: SURGERY

## 2023-12-07 PROCEDURE — G0426 INPT/ED TELECONSULT50: HCPCS | Mod: 95,,, | Performed by: STUDENT IN AN ORGANIZED HEALTH CARE EDUCATION/TRAINING PROGRAM

## 2023-12-07 PROCEDURE — C1751 CATH, INF, PER/CENT/MIDLINE: HCPCS

## 2023-12-07 PROCEDURE — 63600175 PHARM REV CODE 636 W HCPCS: Performed by: STUDENT IN AN ORGANIZED HEALTH CARE EDUCATION/TRAINING PROGRAM

## 2023-12-07 PROCEDURE — 25000003 PHARM REV CODE 250: Performed by: SURGERY

## 2023-12-07 RX ORDER — NALOXONE HCL 0.4 MG/ML
0.4 VIAL (ML) INJECTION
Status: DISCONTINUED | OUTPATIENT
Start: 2023-12-07 | End: 2023-12-08 | Stop reason: HOSPADM

## 2023-12-07 RX ADMIN — Medication 10 ML: at 12:12

## 2023-12-07 RX ADMIN — TAMSULOSIN HYDROCHLORIDE 0.4 MG: 0.4 CAPSULE ORAL at 08:12

## 2023-12-07 RX ADMIN — VANCOMYCIN HYDROCHLORIDE 500 MG: 500 INJECTION, POWDER, LYOPHILIZED, FOR SOLUTION INTRAVENOUS at 12:12

## 2023-12-07 RX ADMIN — INSULIN DETEMIR 8 UNITS: 100 INJECTION, SOLUTION SUBCUTANEOUS at 09:12

## 2023-12-07 RX ADMIN — POLYETHYLENE GLYCOL 3350 17 G: 17 POWDER, FOR SOLUTION ORAL at 08:12

## 2023-12-07 RX ADMIN — FERROUS SULFATE TAB 325 MG (65 MG ELEMENTAL FE) 1 EACH: 325 (65 FE) TAB at 09:12

## 2023-12-07 RX ADMIN — APIXABAN 5 MG: 5 TABLET, FILM COATED ORAL at 09:12

## 2023-12-07 RX ADMIN — FERROUS SULFATE TAB 325 MG (65 MG ELEMENTAL FE) 1 EACH: 325 (65 FE) TAB at 08:12

## 2023-12-07 RX ADMIN — INSULIN ASPART 2 UNITS: 100 INJECTION, SOLUTION INTRAVENOUS; SUBCUTANEOUS at 08:12

## 2023-12-07 RX ADMIN — SENNOSIDES AND DOCUSATE SODIUM 1 TABLET: 8.6; 5 TABLET ORAL at 08:12

## 2023-12-07 RX ADMIN — FUROSEMIDE 40 MG: 40 TABLET ORAL at 08:12

## 2023-12-07 RX ADMIN — AMIODARONE HYDROCHLORIDE 200 MG: 200 TABLET ORAL at 08:12

## 2023-12-07 RX ADMIN — OXYCODONE HYDROCHLORIDE AND ACETAMINOPHEN 1 TABLET: 10; 325 TABLET ORAL at 08:12

## 2023-12-07 RX ADMIN — SODIUM CHLORIDE, POTASSIUM CHLORIDE, SODIUM LACTATE AND CALCIUM CHLORIDE: 600; 310; 30; 20 INJECTION, SOLUTION INTRAVENOUS at 08:12

## 2023-12-07 RX ADMIN — METOLAZONE 5 MG: 5 TABLET ORAL at 08:12

## 2023-12-07 RX ADMIN — Medication 10 ML: at 06:12

## 2023-12-07 RX ADMIN — SODIUM CHLORIDE, POTASSIUM CHLORIDE, SODIUM LACTATE AND CALCIUM CHLORIDE: 600; 310; 30; 20 INJECTION, SOLUTION INTRAVENOUS at 03:12

## 2023-12-07 RX ADMIN — NALXONE HYDROCHLORIDE 0.02 MG: 0.4 INJECTION INTRAMUSCULAR; INTRAVENOUS; SUBCUTANEOUS at 03:12

## 2023-12-07 RX ADMIN — FOLIC ACID 1 MG: 1 TABLET ORAL at 08:12

## 2023-12-07 RX ADMIN — METOPROLOL SUCCINATE 12.5 MG: 25 TABLET, EXTENDED RELEASE ORAL at 08:12

## 2023-12-07 RX ADMIN — SENNOSIDES AND DOCUSATE SODIUM 1 TABLET: 8.6; 5 TABLET ORAL at 09:12

## 2023-12-07 RX ADMIN — INSULIN ASPART 2 UNITS: 100 INJECTION, SOLUTION INTRAVENOUS; SUBCUTANEOUS at 04:12

## 2023-12-07 RX ADMIN — INSULIN ASPART 2 UNITS: 100 INJECTION, SOLUTION INTRAVENOUS; SUBCUTANEOUS at 12:12

## 2023-12-07 RX ADMIN — INSULIN DETEMIR 8 UNITS: 100 INJECTION, SOLUTION SUBCUTANEOUS at 08:12

## 2023-12-07 RX ADMIN — ATORVASTATIN CALCIUM 40 MG: 40 TABLET, FILM COATED ORAL at 09:12

## 2023-12-07 RX ADMIN — ASPIRIN 81 MG CHEWABLE TABLET 81 MG: 81 TABLET CHEWABLE at 08:12

## 2023-12-07 NOTE — PROGRESS NOTES
[facility] Medicine  Progress Note    Patient Name: Chato Bowie  MRN: [id number]  Patient Class: IP- Inpatient   Admission Date: 11/27/2023  Length of Stay: 10 days  Attending Physician: Sea Phelan MD  Primary Care Provider: Irlanda Valenzuela -        Subjective:     Principal Problem:Osteomyelitis of foot        HPI:  73-year-old  male,current a nursing home resident,who was brought to the ED for evaluation after receiving x-ray imaging obtained 5 days earlier with concern for possible osteomyelitis,prior to obtaining the knee and foot x-ray,he had complained of left knee pain,he also has bilateral heel decubitus ulcers,wound is covered with escar,and can not be staged,although it was reported that he has osteomyelitis but the xray obtained did not show evidence of osteo per report,pain in the knee is sharp,non radiating,could be 10 out 10 in severity some time,currently only able to move around in a wheel chair,he denied fever,chills or rigor,although reports straining at micturition and urgency but denied dysuria,frequency or hematuria.He denied chest pain,shortness of breath,or orthopnea,although he has chronic bilateral lower extremities swelling,worse in the left lower extremity compared to the right,he denied previous history of lower extremity deep vein thrombosis.He denied change in bowel habit,no diarrhea or constipation.  His medical history os significant for type 2 DM,hypertension,hyperlipidemia,heart failure,coronary artery disease, hyperlipidemia,alcohol abuse,CKD 3b and multiple thyroid nodules.labs obtained showed elevated acute phase reactant with sed rate 83,procalcitonin 6.09,crp 57.8,but x-ray did not show evidence, will need MRI to confirm early osteomyelitis.    Overview/Hospital Course:  Mr. Bowie is a 72 yo M who was admitted to the hospital for evaluation of bilateral heel decubitus ulcers concerning for osteomyelitis. Also with worsening left  knee pain with swelling.  Urinalysis also concern for infection.  Orthopedic surgery was consulted for evaluation of left knee.  X-ray noted to have low wrist effusion however unable to tap much fluid.  Do not suspect infectious.  Most likely arthritic.  Podiatry was consulted for bilateral heel wounds and he underwent MRI which showed bilateral heel osteomyelitis with left 5th metatarsal osteomyelitis and possible right 5th osteomyelitis.  Initially started on broad-spectrum antibiotics and Infectious Disease consulted.  Urine culture with Proteus mirabilis, right foot wound with MRSA.  Patient underwent debridement of his bilateral heels and 5th metatarsals with bone cultures taken.  Left foot bone culture growing staph aureus.  Vascular surgery consulted for evaluation of poor healing in the left lower extremity.  He underwent angiogram on 12/06 with patent flow.  Remains on vancomycin.  We will need at least 6 weeks of IV antibiotics given osteomyelitis.  Patient developed rapid response on 12/07 early a.m. for concerns for unresponsiveness.  He was given Narcan with mild improvement.  Later that day, patient continued to be unresponsive with left-sided weakness.  Code stroke called and patient underwent CT head with no acute changes.  Recommendations from telemedicine neurology include CTA head and neck however given patient's worsening renal function unable to do this.    Interval History: Contacted by [facility] provider that patient is not as responsive and will need to be transferred back to in house physician to be evaluated.    On my exam, patient is lying in bed. Will awake but constantly moans, not having conversation. He will not make eye contact, did not move left arm or follow commands.  Code stroke called and patient went for CT head which showed no acute findings.  Telemedicine neurology consulted.  Suspect this is more encephalopathic given he has no focal deficits he withdrawals to pain in his moving  "all extremities at this time besides chronic left lower extremity weakness.  Recommending CTA of head and neck.  This was ordered however due to his worsening renal function unable to do.    Review of Systems  Objective:     Vital Signs (Most Recent):  Temp: 98.6 °F (37 °C) (12/07/23 0818)  Pulse: 77 (12/07/23 0818)  Resp: 18 (12/07/23 0839)  BP: (!) 187/80 (12/07/23 0818)  SpO2: 99 % (12/07/23 0818) Vital Signs (24h Range):  Temp:  [97.5 °F (36.4 °C)-98.6 °F (37 °C)] 98.6 °F (37 °C)  Pulse:  [63-77] 77  Resp:  [10-23] 18  SpO2:  [96 %-100 %] 99 %  BP: (111-187)/(53-81) 187/80     Weight: 81 kg (178 lb 9.2 oz)  Body mass index is 24.91 kg/m².    Intake/Output Summary (Last 24 hours) at 12/7/2023 1122  Last data filed at 12/7/2023 0839  Gross per 24 hour   Intake 809.78 ml   Output 900 ml   Net -90.22 ml         Physical Exam  Vitals and nursing note reviewed.   Constitutional:       General: He is not in acute distress.     Appearance: He is ill-appearing.      Comments: Moaning, repeating "oh God"   Eyes:      Extraocular Movements: Extraocular movements intact.      Pupils: Pupils are equal, round, and reactive to light.   Cardiovascular:      Rate and Rhythm: Normal rate and regular rhythm.      Comments: Left groin site intact, no erythema, swelling or concerns in area  Pulmonary:      Effort: Pulmonary effort is normal. No respiratory distress.   Abdominal:      General: Abdomen is flat. There is no distension.   Musculoskeletal:      Comments: Left knee swelling, no erythema/warmth  B/l feet are dressed with bandages   Neurological:      Mental Status: He is alert.      Comments: Groaning, not answering questions or following commands               Significant Labs: All pertinent labs within the past 24 hours have been reviewed.    Significant Imaging: I have reviewed all pertinent imaging results/findings within the past 24 hours.    Assessment/Plan:      * Osteomyelitis of foot  73M admitted 11/27 from NH " for b/l wounds.   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   Wound swab of R ulcer with MRSA.   ID, podiatry consulted.    s/p bone biopsy and debridement by podiatry   cultures + s aureus   continue local wound care   Continue antibiotics per Infectious Disease recommendations.  Anticipate 6 wks of IV Vanc  Will need PICC line    Antibiotics (From admission, onward)      Start     Stop Route Frequency Ordered    11/29/23 1307  vancomycin - pharmacy to dose  (vancomycin IVPB (PEDS and ADULTS))        See Hyperspace for full Linked Orders Report.    -- IV pharmacy to manage frequency 11/29/23 1207                Acute delirium  -patient with some withdrawn or fighting behavior.  -Continue correction of metabolic derangements  -Maintain Delirium precautions: Maintain regular sleep cycle. Early ambulation. Minimal interruptions overnight. Re-orient patient frequently. Maintain adequate bowel regimen, hydration and electrolyte replenishments. Hearing Aids and eye glasses as needed.      Atherosclerosis of native artery of extremity  Vascular consulted  S/p angiogram on 12/6  No role for revascularization and no intervention performed       Chronic diastolic heart failure  History of grade 3 diastolic dysfunction  Does not seem to be in acute diastolic heart failure exacerbation  Will continue furosemide, with metolazone - currently on hold for ELIZABETH        Multiple wounds  History of multiple decubitus ulcers, previous buttock ulcers now healed  Has bilateral heel wound and lat rt foot  CRP 57.8,Sed rate 83,with procalcitonin elevated at 6.09  Podiatry and ID consulted  -S/p OR debridement/ bone biopsy B/L feet.Left heel and lateral foot with necrotic base. Abx beads in place to all wounds. DSD applied.   -wound cultures growing MRSA. Started on vancomycin/cefepime/flagyl per id while pending cultures.  Cefepime now discontinued   -Vascular surgery consulted.  Angiogram on 12/06  -continue wound care  -will need SNF  placement for ongoing IV antibiotics (anticipated for 6 weeks) and wound care    Acute encephalopathy  Developmental status changes over the night of 12/6 2 12/7.  Initially concerned for stroke, code stroke called and underwent CT of head with no acute findings.  Telemedicine neurology consulted.  Recommending CTA of head and neck.  This was ordered however due to renal function and able to obtain at this time.  He is no focal deficits.  Holding opiates, Narcan as needed.  Lab work thus far unrevealing of etiology.  No other medications appear to cause encephalopathy.  Currently maintaining airway, awakening appropriately.  - check ammonia  - check CTA when renal function allows  - continue treating infection         UTI (urinary tract infection)  Complained of urgency and straining at micturition  UA showed cloudy urine,with 3+ leukocyte esterase,rbc 66,wbc>100 and moderate bacteria  Currently symptoms has abaited, proteus growing in urine with ceftriaxone sensitive.  -continue abx    Paroxysmal atrial flutter  - His of paroxysmal atrial fibrillation with multiple MARIA and cardiovesion  - Currently on amiodarone and metoprolol 50xl  - Cont apixan due to risk of thromboembolic stroke  - continue to monitor on telemetry   - Maintain K > 4, Mg > 2        BPH (benign prostatic hyperplasia)  Stable  Will consider tamsulosin for symptomatic treatment      Stage 3b chronic kidney disease (CKD)  Creatine stable for now. BMP reviewed- noted Estimated Creatinine Clearance: 28 mL/min (A) (based on SCr of 2.5 mg/dL (H)). according to latest data. Based on current GFR, CKD stage is stage 3 - GFR 30-59.    Monitor UOP and serial BMP and adjust therapy as needed. Renally dose meds.   Avoid nephrotoxic medications and procedures.  - Cr slightly worse today, Cr 2.5 - started on gentle IVF - unable to get CTA h/n    Left knee pain  History of chronic left knee pain  Xray showed effusion  Septic arthritis vs gout vs effusion  Knee  tap attempted, but minimal fluid able to be collected. Ortho following, but feel this is unlikely septic  ID following.  Continue antibiotics as stated above      Alcohol abuse  History of alcohol use  Now in remission, has not had a drink in the recent past  Not at risk of withdrawal  Cont multivitamin,thiamine and folic acid      Hypokalemia  -labs reviewed  -potassium 3.1 on 12/06  -replete and monitor    GERD (gastroesophageal reflux disease)  - Stable    Anemia  Patient's anemia is currently uncontrolled. Has not received any PRBCs to date. Etiology likely d/t Iron deficiency and chronic disease due to Chronic Kidney Disease/ESRD  Current CBC reviewed-   Lab Results   Component Value Date    HGB 9.8 (L) 12/07/2023    HCT 33.0 (L) 12/07/2023   Will obtain iron panel and ferritin  Monitor serial CBC and transfuse if patient becomes hemodynamically unstable, symptomatic or H/H drops below 7/21.    Type 2 diabetes mellitus with kidney complication, with long-term current use of insulin  Patient's FSGs are controlled on current medication regimen.  Last A1c reviewed-   Lab Results   Component Value Date    HGBA1C 5.5 09/20/2023     Most recent fingerstick glucose reviewed-   Recent Labs   Lab 12/06/23  1957 12/07/23  0313 12/07/23  0812 12/07/23  1154   POCTGLUCOSE 100 100 95 123*       Current correctional scale  Low  Low hemoglobin A1C likely due to worsening kidney functions  Antihyperglycemics (From admission, onward)      Start     Stop Route Frequency Ordered    11/28/23 0900  insulin detemir U-100 (Levemir) pen 8 Units         -- SubQ 2 times daily 11/28/23 0241    11/28/23 0715  insulin aspart U-100 pen 2 Units         -- SubQ 3 times daily with meals 11/28/23 0241    11/28/23 0334  insulin aspart U-100 pen 0-5 Units         -- SubQ Before meals & nightly PRN 11/28/23 0241          Hold Oral hypoglycemics while patient is in the hospital.  Will start basal,bolus and correctional insulin as  needed    Dyslipidemia  -Continue statin  -Stable,lifestyle modification      Essential hypertension  Chronic, uncontrolled. Latest blood pressure and vitals reviewed-     Temp:  [97.5 °F (36.4 °C)-98.6 °F (37 °C)]   Pulse:  [63-77]   Resp:  [10-23]   BP: (111-187)/(53-81)   SpO2:  [96 %-100 %] .   Home meds for hypertension were reviewed and noted below.   Hypertension Medications               furosemide (LASIX) 80 MG tablet Take 1 tablet (80 mg total) by mouth 2 (two) times daily.    metOLazone (ZAROXOLYN) 5 MG tablet Take 1 tablet (5 mg total) by mouth once daily.    metoprolol succinate (TOPROL-XL) 25 MG 24 hr tablet Take 25 mg by mouth once daily.            While in the hospital, will manage blood pressure as follows; Continue home antihypertensive regimen    Will utilize p.r.n. blood pressure medication only if patient's blood pressure greater than 180/110 and he develops symptoms such as worsening chest pain or shortness of breath.    Abdominal pain  -KUB on 12/5 shows stool burden  -stool softeners ordered .  Patient had a bowel movement on 12/06.  -continue stool softeners for now  - resolved.         VTE Risk Mitigation (From admission, onward)           Ordered     apixaban tablet 5 mg  2 times daily         12/07/23 1423     Place sequential compression device  Until discontinued         11/28/23 0241     Reason for no Mechanical VTE Prophylaxis  Once        Question:  Reasons:  Answer:  Physician Provided (leave comment)    11/28/23 0241     IP VTE HIGH RISK PATIENT  Once         11/28/23 0241                    Discharge Planning   ELY:      Code Status: Full Code   Is the patient medically ready for discharge?:     Reason for patient still in hospital (select all that apply): Treatment  Discharge Plan A: Return to nursing home   Discharge Delays: (!) Procedure Scheduling (IR, OR, Labs, Echo, Cath, Echo, EEG)              Sea Phelan MD  Department of Hospital Medicine   [facility]

## 2023-12-07 NOTE — NURSING
[facility]  Nurses Note -- 4 Eyes      12/6/2023       Skin assessed on: Q Shift      [x] No Pressure Injuries Present    [x]Prevention Measures Documented    [] Yes LDA  for Pressure Injury Previously documented     [] Yes New Pressure Injury Discovered   [] LDA for New Pressure Injury Added      Attending RN:  Sameera Danielle RN     Second RN:  Samantha GREER RN

## 2023-12-07 NOTE — PROCEDURES
"Chato Bowie is a 73 y.o. male patient.    Temp: 97.8 °F (36.6 °C) (12/07/23 1153)  Pulse: 74 (12/07/23 1153)  Resp: 20 (12/07/23 1153)  BP: (!) 160/74 (12/07/23 1153)  SpO2: 98 % (12/07/23 1153)  Weight: 81 kg (178 lb 9.2 oz) (12/07/23 0400)  Height: 5' 11" (180.3 cm) (12/06/23 1153)    PICC  Date/Time: 12/7/2023 2:57 PM  Performed by: Alphonso Taylor RN  Consent Done: Yes  Time out: Immediately prior to procedure a time out was called to verify the correct patient, procedure, equipment, support staff and site/side marked as required  Indications: med administration and vascular access  Anesthesia: local infiltration  Local anesthetic: lidocaine 1% without epinephrine  Anesthetic Total (mL): 2  Preparation: skin prepped with ChloraPrep  Skin prep agent dried: skin prep agent completely dried prior to procedure  Sterile barriers: all five maximum sterile barriers used - cap, mask, sterile gown, sterile gloves, and large sterile sheet  Hand hygiene: hand hygiene performed prior to central venous catheter insertion  Location details: right brachial  Catheter type: double lumen  Catheter size: 5 Fr  Catheter Length: 36cm    Ultrasound guidance: yes  Vessel Caliber: medium and patent, compressibility normal  Vascular Doppler: not done  Needle advanced into vessel with real time Ultrasound guidance.  Guidewire confirmed in vessel.  Sterile sheath used.  no esophageal manometryNumber of attempts: 1  Post-procedure: blood return through all ports, chlorhexidine patch and sterile dressing applied  Estimated blood loss (mL): 0            Name Alphonso Taylor RN  12/7/2023    "

## 2023-12-07 NOTE — SUBJECTIVE & OBJECTIVE
"Interval History: Contacted by [facility] provider that patient is not as responsive and will need to be transferred back to in house physician to be evaluated.    On my exam, patient is lying in bed. Will awake but constantly moans, not having conversation. He will not make eye contact, did not move left arm or follow commands.  Code stroke called and patient went for CT head which showed no acute findings.  Telemedicine neurology consulted.  Suspect this is more encephalopathic given he has no focal deficits he withdrawals to pain in his moving all extremities at this time besides chronic left lower extremity weakness.  Recommending CTA of head and neck.  This was ordered however due to his worsening renal function unable to do.    Review of Systems  Objective:     Vital Signs (Most Recent):  Temp: 98.6 °F (37 °C) (12/07/23 0818)  Pulse: 77 (12/07/23 0818)  Resp: 18 (12/07/23 0839)  BP: (!) 187/80 (12/07/23 0818)  SpO2: 99 % (12/07/23 0818) Vital Signs (24h Range):  Temp:  [97.5 °F (36.4 °C)-98.6 °F (37 °C)] 98.6 °F (37 °C)  Pulse:  [63-77] 77  Resp:  [10-23] 18  SpO2:  [96 %-100 %] 99 %  BP: (111-187)/(53-81) 187/80     Weight: 81 kg (178 lb 9.2 oz)  Body mass index is 24.91 kg/m².    Intake/Output Summary (Last 24 hours) at 12/7/2023 1122  Last data filed at 12/7/2023 0839  Gross per 24 hour   Intake 809.78 ml   Output 900 ml   Net -90.22 ml         Physical Exam  Vitals and nursing note reviewed.   Constitutional:       General: He is not in acute distress.     Appearance: He is ill-appearing.      Comments: Moaning, repeating "oh God"   Eyes:      Extraocular Movements: Extraocular movements intact.      Pupils: Pupils are equal, round, and reactive to light.   Cardiovascular:      Rate and Rhythm: Normal rate and regular rhythm.      Comments: Left groin site intact, no erythema, swelling or concerns in area  Pulmonary:      Effort: Pulmonary effort is normal. No respiratory distress.   Abdominal:      General: " Abdomen is flat. There is no distension.   Musculoskeletal:      Comments: Left knee swelling, no erythema/warmth  B/l feet are dressed with bandages   Neurological:      Mental Status: He is alert.      Comments: Groaning, not answering questions or following commands               Significant Labs: All pertinent labs within the past 24 hours have been reviewed.    Significant Imaging: I have reviewed all pertinent imaging results/findings within the past 24 hours.

## 2023-12-07 NOTE — PLAN OF CARE
Transferred to ICU due to acute change in mental status. Pt being evaluated by tele stroke at time of rounds. Chart reviewed.    #osteomyelitis b/l calcaneus, b/l  5th metatarsal  Path showing osteonecrosis; no acute osteo. However, bone cx growing MRSA so will plan to treat x 6 wks.   --will switch vanc to renally dosed dapto given ckd to avoid nephrotoxicity on longterm therapy  --cpk with am labs    ID will sign off for now.   Will arrange f/u in ID clinic.     Outpatient Antibiotic Therapy Plan: at nursing home    Please send referral to [facility].     1) Infection: osteomyelitis     2) Discharge Antibiotics:    Intravenous antibiotics:  (If CrCL <30): Daptomycin 650mg IV q 48 h   (If CrCl >30): daptomycin 650mg IV q 24h        3) Therapy Duration:  6 wks    Estimated end date of IV antibiotics: 1/10/23    4) Outpatient Weekly Labs:    Order the following labs to be drawn on Mondays:   CBC  CMP   CRP    CPK (when on Daptomycin)      5) Fax Lab Results to Infectious Diseases Provider: Susanne Dumont     [facility] Fax Number: [phone]    6) Outpatient Infectious Diseases Follow-up    Follow-up appointment will be arranged by the ID clinic and will be found in the patient's appointments tab.    Prior to discharge, please ensure the patient's follow-up has been scheduled.    If there is still no follow-up scheduled prior to discharge, please send an EPIC message to Mandi Brito in Infectious Diseases.

## 2023-12-07 NOTE — ASSESSMENT & PLAN NOTE
Vascular consulted  S/p angiogram on 12/6  No role for revascularization and no intervention performed

## 2023-12-07 NOTE — PLAN OF CARE
Patient AAOx0. VS stable, afrebrile. Pain managed. Bilateral lower extremity wounds, doppler used to check pedal pulses. Free from falls. Tele sitter at bedside. Frequent rounds made for safety, pain and comfort. Bed at lowest position, call light within reach, side rails up x2.    Problem: Adult Inpatient Plan of Care  Goal: Plan of Care Review  Outcome: Ongoing, Progressing  Goal: Patient-Specific Goal (Individualized)  Outcome: Ongoing, Progressing  Goal: Absence of Hospital-Acquired Illness or Injury  Outcome: Ongoing, Progressing

## 2023-12-07 NOTE — ASSESSMENT & PLAN NOTE
History of grade 3 diastolic dysfunction  Does not seem to be in acute diastolic heart failure exacerbation  Will continue furosemide, with metolazone - currently on hold for ELIZABETH

## 2023-12-07 NOTE — CARE UPDATE
Time Stroke Code initiated: 1019  Stroke Team Arrival time: 1018  Team Members:Dr. Phelan, ZOEY Rodríguez N Phan  Stroke Code activation triggers: Change in level of consciousness, Weakness on left side    Glucose Time: 1020            Glucose Result: 140    Arrived at CT: 1036  CT completed: 1039    Vascular Neurologist you spoke with: Jesika ALMENDAREZ positive or negative: He is able to look in direction of person speaking to him, only moans, no words, appears weaker on left side.  NIH Stroke Scale Total Score: 20    tPA decision: No  tPA bolus (mg and time):  tPA infusion (mg and time):  tPA end time:    IR decision:  IR arrival:  IR end time:     Pt transferred to: Back to room 408  Report given to:

## 2023-12-07 NOTE — ASSESSMENT & PLAN NOTE
Patient's FSGs are controlled on current medication regimen.  Last A1c reviewed-   Lab Results   Component Value Date    HGBA1C 5.5 09/20/2023     Most recent fingerstick glucose reviewed-   Recent Labs   Lab 12/06/23 1957 12/07/23  0313 12/07/23  0812 12/07/23  1154   POCTGLUCOSE 100 100 95 123*       Current correctional scale  Low  Low hemoglobin A1C likely due to worsening kidney functions  Antihyperglycemics (From admission, onward)    Start     Stop Route Frequency Ordered    11/28/23 0900  insulin detemir U-100 (Levemir) pen 8 Units         -- SubQ 2 times daily 11/28/23 0241    11/28/23 0715  insulin aspart U-100 pen 2 Units         -- SubQ 3 times daily with meals 11/28/23 0241    11/28/23 0334  insulin aspart U-100 pen 0-5 Units         -- SubQ Before meals & nightly PRN 11/28/23 0241        Hold Oral hypoglycemics while patient is in the hospital.  Will start basal,bolus and correctional insulin as needed

## 2023-12-07 NOTE — SUBJECTIVE & OBJECTIVE
Interval History: Patient with rapid response this morning after being found unresponsive. He was given narcan with mild improvement. Now lethargic and poorly responsive. Will transfer back to in house team.     Review of Systems   Unable to perform ROS: Mental status change     Objective:     Vital Signs (Most Recent):  Temp: 98.6 °F (37 °C) (12/07/23 0818)  Pulse: 77 (12/07/23 0818)  Resp: 18 (12/07/23 0839)  BP: (!) 187/80 (12/07/23 0818)  SpO2: 99 % (12/07/23 0818) Vital Signs (24h Range):  Temp:  [97.5 °F (36.4 °C)-98.6 °F (37 °C)] 98.6 °F (37 °C)  Pulse:  [63-77] 77  Resp:  [10-23] 18  SpO2:  [96 %-100 %] 99 %  BP: (111-187)/(53-81) 187/80     Weight: 81 kg (178 lb 9.2 oz)  Body mass index is 24.91 kg/m².    Intake/Output Summary (Last 24 hours) at 12/7/2023 0943  Last data filed at 12/7/2023 0557  Gross per 24 hour   Intake 689.78 ml   Output 900 ml   Net -210.22 ml         Physical Exam  Constitutional:       General: He is in acute distress.      Appearance: He is well-developed and well-groomed. He is ill-appearing.   HENT:      Head: Normocephalic and atraumatic.   Cardiovascular:      Rate and Rhythm: Normal rate.   Pulmonary:      Effort: No tachypnea or respiratory distress.   Neurological:      Mental Status: He is lethargic and disoriented.   Psychiatric:         Attention and Perception: He is inattentive.             Significant Labs: All pertinent labs within the past 24 hours have been reviewed.    Significant Imaging: I have reviewed all pertinent imaging results/findings within the past 24 hours.

## 2023-12-07 NOTE — PROGRESS NOTES
Pharmacokinetic Assessment Follow Up: IV Vancomycin    Vancomycin serum concentration assessment(s):    The random level was drawn correctly and can be used to guide therapy at this time. The measurement is within the desired definitive target range of 15 to 20 mcg/mL.    Vancomycin Regimen Plan:    Give 500 mg today.  Re-dose when the random level is less than 20 mcg/mL, next level to be drawn at 0400 on 12/8/2023    Drug levels (last 3 results):  Recent Labs   Lab Result Units 12/05/23  0433 12/06/23  0426 12/07/23  0808   Vancomycin, Random ug/mL 17.3 20.7 17.8       Pharmacy will continue to follow and monitor vancomycin.    Please contact pharmacy at extension [phone] for questions regarding this assessment.    Thank you for the consult,   Ryan Walker Jr       Patient brief summary:  Chato Bowie is a 73 y.o. male initiated on antimicrobial therapy with IV Vancomycin for treatment of skin & soft tissue infection    Drug Allergies:   Review of patient's allergies indicates:  No Known Allergies    Actual Body Weight:   81 kg    Renal Function:   Estimated Creatinine Clearance: 30.5 mL/min (A) (based on SCr of 2.3 mg/dL (H)).,     Dialysis Method (if applicable):  N/A    CBC (last 72 hours):  Recent Labs   Lab Result Units 12/05/23 0433 12/06/23  0958   WBC K/uL 8.50 9.97   Hemoglobin g/dL 8.9* 10.3*   Hematocrit % 30.2* 34.7*   Platelets K/uL 285 313   Gran % % 61.8  --    Lymph % % 15.4*  --    Mono % % 15.9*  --    Eosinophil % % 4.9  --    Basophil % % 1.3  --    Differential Method  Automated  --        Metabolic Panel (last 72 hours):  Recent Labs   Lab Result Units 12/05/23 0433 12/06/23  0958   Sodium mmol/L 135* 137   Potassium mmol/L 3.6 3.1*   Chloride mmol/L 94* 96   CO2 mmol/L 27 25   Glucose mg/dL 62* 74   BUN mg/dL 52* 48*   Creatinine mg/dL 2.3* 2.3*   Magnesium mg/dL 2.0  --        Vancomycin Administrations:  vancomycin given in the last 96 hours                     vancomycin (VANCOCIN)  500 mg in dextrose 5 % in water (D5W) 100 mL IVPB (MB+) ()  Restarted 12/05/23 1015     500 mg New Bag  0920    vancomycin (VANCOCIN) 500 mg in dextrose 5 % in water (D5W) 100 mL IVPB (MB+) (mg) 500 mg New Bag 12/04/23 0927                    Microbiologic Results:  Microbiology Results (last 7 days)       Procedure Component Value Units Date/Time    Aerobic culture [[id number]]  (Abnormal) Collected: 12/01/23 1123    Order Status: Completed Specimen: Bone from Foot, Left Updated: 12/07/23 0753     Aerobic Bacterial Culture STAPHYLOCOCCUS AUREUS  From broth only  For susceptibility see order #[id number]      Narrative:      Right 5th metatarsal    Aerobic culture [[id number]]  (Abnormal) Collected: 12/01/23 1123    Order Status: Completed Specimen: Bone from Foot, Left Updated: 12/07/23 0752     Aerobic Bacterial Culture STAPHYLOCOCCUS AUREUS  From broth only  For susceptibility see order #[id number]      Narrative:      Right heel    Aerobic culture [[id number]]  (Abnormal)  (Susceptibility) Collected: 12/01/23 1123    Order Status: Completed Specimen: Bone from Foot, Left Updated: 12/07/23 0751     Aerobic Bacterial Culture METHICILLIN RESISTANT STAPHYLOCOCCUS AUREUS  From broth only      Narrative:      Left 5th metatarsal    Aerobic culture [[id number]] Collected: 12/01/23 1123    Order Status: Completed Specimen: Bone from Foot, Left Updated: 12/05/23 0754     Aerobic Bacterial Culture No growth    Narrative:      Left heel    Culture, Anaerobe [[id number]] Collected: 12/01/23 1123    Order Status: Completed Specimen: Bone from Foot, Left Updated: 12/05/23 0526     Anaerobic Culture No anaerobes isolated    Narrative:      Right 5th metatarsal    Culture, Anaerobe [[id number]] Collected: 12/01/23 1123    Order Status: Completed Specimen: Bone from Foot, Left Updated: 12/05/23 0525     Anaerobic Culture No anaerobes isolated    Narrative:      Right heel    Culture, Anaerobe [[id number]] Collected: 12/01/23 1123     Order Status: Completed Specimen: Bone from Foot, Left Updated: 12/05/23 0525     Anaerobic Culture No anaerobes isolated    Narrative:      Left 5th metatarsal    Culture, Anaerobe [[id number]] Collected: 12/01/23 1123    Order Status: Completed Specimen: Bone from Foot, Left Updated: 12/05/23 0524     Anaerobic Culture No anaerobes isolated    Narrative:      Left heel    AFB Culture & Smear [[id number]] Collected: 12/01/23 1123    Order Status: Completed Specimen: Bone from Foot, Right Updated: 12/04/23 1534     AFB Culture & Smear Culture in progress     AFB CULTURE STAIN No acid fast bacilli seen.    Narrative:      Right heel    AFB Culture & Smear [[id number]] Collected: 12/01/23 1123    Order Status: Completed Specimen: Bone from Foot, Left Updated: 12/04/23 1534     AFB Culture & Smear Culture in progress     AFB CULTURE STAIN No acid fast bacilli seen.    Narrative:      Left heel    AFB Culture & Smear [[id number]] Collected: 12/01/23 1123    Order Status: Completed Specimen: Bone from Foot, Left Updated: 12/04/23 1534     AFB Culture & Smear Culture in progress     AFB CULTURE STAIN No acid fast bacilli seen.    Narrative:      Left 5th metatarsal    AFB Culture & Smear [[id number]] Collected: 12/01/23 1123    Order Status: Completed Specimen: Bone from Foot, Right Updated: 12/04/23 1534     AFB Culture & Smear Culture in progress     AFB CULTURE STAIN No acid fast bacilli seen.    Narrative:      Right 5th metatarsal    Culture, Anaerobe [[id number]] Collected: 11/28/23 1347    Order Status: Completed Specimen: Wound from Foot, Right Updated: 12/02/23 0855     Anaerobic Culture No anaerobes isolated    Blood culture #1 [[id number]] Collected: 11/27/23 1520    Order Status: Completed Specimen: Blood from Peripheral, Antecubital, Left Updated: 12/01/23 1703     Blood Culture, Routine No Growth after 4 days.    Narrative:      Blood Culture #1    Gram stain [[id number]] Collected: 12/01/23 1123    Order  Status: Completed Specimen: Bone from Foot, Left Updated: 12/01/23 1429     Gram Stain Result Few WBC's      No organisms seen    Narrative:      Right 5th metatarsal    Gram stain [[id number]] Collected: 12/01/23 1123    Order Status: Completed Specimen: Bone from Foot, Left Updated: 12/01/23 1428     Gram Stain Result Rare WBC's      No organisms seen    Narrative:      Right heel    Gram stain [[id number]] Collected: 12/01/23 1123    Order Status: Completed Specimen: Bone from Foot, Left Updated: 12/01/23 1428     Gram Stain Result Rare WBC's      No organisms seen    Narrative:      Left 5th metatarsal    Gram stain [[id number]] Collected: 12/01/23 1123    Order Status: Completed Specimen: Bone from Foot, Left Updated: 12/01/23 1427     Gram Stain Result Rare WBC's      No organisms seen    Narrative:      Left heel    Fungus culture [[id number]] Collected: 12/01/23 1123    Order Status: No result Specimen: Bone from Foot, Left Updated: 12/01/23 1220    Fungus culture [[id number]] Collected: 12/01/23 1123    Order Status: Sent Specimen: Bone from Foot, Left Updated: 12/01/23 1216    Fungus culture [[id number]] Collected: 12/01/23 1123    Order Status: Sent Specimen: Bone from Foot, Left Updated: 12/01/23 1213    Fungus culture [[id number]] Collected: 12/01/23 1123    Order Status: Sent Specimen: Bone from Foot, Left Updated: 12/01/23 1207    Fungus culture [[id number]]     Order Status: No result Specimen: Bone from Foot, Left     Blood culture #2 [[id number]]  (Abnormal) Collected: 11/27/23 1512    Order Status: Completed Specimen: Blood from Peripheral, Antecubital, Left Updated: 12/01/23 0537     Blood Culture, Routine Gram stain aer bottle: Gram positive cocci in clusters resembling Staph      Results called to and read back by:Kathleen everett 11/28/2023      13:53      COAGULASE-NEGATIVE STAPHYLOCOCCUS SPECIES  Organism is a probable contaminant      Narrative:      Blood Culture #2    Aerobic  culture [[id number]]  (Abnormal)  (Susceptibility) Collected: 11/28/23 1347    Order Status: Completed Specimen: Wound from Foot, Right Updated: 11/30/23 1041     Aerobic Bacterial Culture Results called to and read back by:CHARITY STAFFORD 11/30/2023  10:39      METHICILLIN RESISTANT STAPHYLOCOCCUS AUREUS  Many

## 2023-12-07 NOTE — ASSESSMENT & PLAN NOTE
Developmental status changes over the night of 12/6 2 12/7.  Initially concerned for stroke, code stroke called and underwent CT of head with no acute findings.  Telemedicine neurology consulted.  Recommending CTA of head and neck.  This was ordered however due to renal function and able to obtain at this time.  He is no focal deficits.  Holding opiates, Narcan as needed.  Lab work thus far unrevealing of etiology.  No other medications appear to cause encephalopathy.  Currently maintaining airway, awakening appropriately.  - check ammonia  - check CTA when renal function allows  - continue treating infection

## 2023-12-07 NOTE — NURSING
RAPID RESPONSE NURSE PROACTIVE ROUNDING NOTE       Time of Visit: 315    Admit Date: 2023  LOS: 10  Code Status: Full Code   Date of Visit: 2023  : 1950  Age: 73 y.o.  Sex: male  Race: Black or   Bed: W408/W408 A:   MRN: [id number]  Was the patient discharged from an ICU this admission? No   Was the patient discharged from a PACU within last 24 hours? No   Did the patient receive conscious sedation/general anesthesia in last 24 hours? No   Was the patient in the ED within the past 24 hours? No   Was the patient on NIPPV within the past 24 hours? No   Attending Physician: Nhung Martínez MD  Primary Service: [facility] [employer] TEAM 1   Time spent at the bedside: 30 - 45 min    SITUATION    Notified by charge RN via phone call  Reason for alert: change in LOC    Diagnosis: Osteomyelitis of foot   has a past medical history of Acute congestive heart failure, Alcohol abuse, Arthritis, Asthma attack, Coronary artery disease, Diabetes mellitus, Hyperlipemia, Hypertension, Multiple thyroid nodules, and Rhabdomyolysis.    Last Vitals:  Temp: 97.8 °F (36.6 °C) ( 2310)  Pulse: 73 ( 0326)  Resp: 18 (315)  BP: 134/64 (326)  SpO2: 98 % (326)    24 Hour Vitals Range:  Temp:  [96.1 °F (35.6 °C)-98.1 °F (36.7 °C)]   Pulse:  [63-74]   Resp:  [10-23]   BP: (111-158)/(53-81)   SpO2:  [96 %-100 %]     Clinical Issues: Neuro    ASSESSMENT/INTERVENTIONS    Found patient lying in bed with eyes closed on 2LNC with LR infusing at 75ml/hr. Patient is not responsive to voice or sternal rub, minimally responsive to noxious nailbed pressure with snoring respirations. . Pupils pinpoint and reactive. Dr. Osborne at bedside. Patient began to be more responsive, narcan given. Patient will now open eyes spontaneously, move arms and is generally more awake.     RECOMMENDATIONS  Will follow up on mental status    Discussed plan of care with  bedside RN, charge RN    PROVIDER  ESCALATION    Physician escalation: Yes    Orders received and case discussed with Dr. Osborne .    Disposition:Remain in room 408    FOLLOW UP    Call back the Rapid Response NurseNesha at [phone] for additional questions or concerns.

## 2023-12-07 NOTE — PROGRESS NOTES
[facility]  Telemedicine Progress Note    Patient Name: Chato Bowie  MRN: [id number]  Patient Class: IP- Inpatient   Admission Date: 11/27/2023  Length of Stay: 10 days  Attending Physician: Gerry Brantley MD  Primary Care Provider: Irlanda Valenzuela -          Subjective:     Principal Problem:Osteomyelitis of foot        HPI:  73-year-old  male,current a nursing home resident,who was brought to the ED for evaluation after receiving x-ray imaging obtained 5 days earlier with concern for possible osteomyelitis,prior to obtaining the knee and foot x-ray,he had complained of left knee pain,he also has bilateral heel decubitus ulcers,wound is covered with escar,and can not be staged,although it was reported that he has osteomyelitis but the xray obtained did not show evidence of osteo per report,pain in the knee is sharp,non radiating,could be 10 out 10 in severity some time,currently only able to move around in a wheel chair,he denied fever,chills or rigor,although reports straining at micturition and urgency but denied dysuria,frequency or hematuria.He denied chest pain,shortness of breath,or orthopnea,although he has chronic bilateral lower extremities swelling,worse in the left lower extremity compared to the right,he denied previous history of lower extremity deep vein thrombosis.He denied change in bowel habit,no diarrhea or constipation.  His medical history os significant for type 2 DM,hypertension,hyperlipidemia,heart failure,coronary artery disease, hyperlipidemia,alcohol abuse,CKD 3b and multiple thyroid nodules.labs obtained showed elevated acute phase reactant with sed rate 83,procalcitonin 6.09,crp 57.8,but x-ray did not show evidence, will need MRI to confirm early osteomyelitis.    Overview/Hospital Course:  No notes on file    Interval History: Patient with rapid response this morning after being found unresponsive. He was given narcan with mild  improvement. Now lethargic and poorly responsive. Will transfer back to in house team.     Review of Systems   Unable to perform ROS: Mental status change     Objective:     Vital Signs (Most Recent):  Temp: 98.6 °F (37 °C) (12/07/23 0818)  Pulse: 77 (12/07/23 0818)  Resp: 18 (12/07/23 0839)  BP: (!) 187/80 (12/07/23 0818)  SpO2: 99 % (12/07/23 0818) Vital Signs (24h Range):  Temp:  [97.5 °F (36.4 °C)-98.6 °F (37 °C)] 98.6 °F (37 °C)  Pulse:  [63-77] 77  Resp:  [10-23] 18  SpO2:  [96 %-100 %] 99 %  BP: (111-187)/(53-81) 187/80     Weight: 81 kg (178 lb 9.2 oz)  Body mass index is 24.91 kg/m².    Intake/Output Summary (Last 24 hours) at 12/7/2023 0943  Last data filed at 12/7/2023 0557  Gross per 24 hour   Intake 689.78 ml   Output 900 ml   Net -210.22 ml         Physical Exam  Constitutional:       General: He is in acute distress.      Appearance: He is well-developed and well-groomed. He is ill-appearing.   HENT:      Head: Normocephalic and atraumatic.   Cardiovascular:      Rate and Rhythm: Normal rate.   Pulmonary:      Effort: No tachypnea or respiratory distress.   Neurological:      Mental Status: He is lethargic and disoriented.   Psychiatric:         Attention and Perception: He is inattentive.             Significant Labs: All pertinent labs within the past 24 hours have been reviewed.    Significant Imaging: I have reviewed all pertinent imaging results/findings within the past 24 hours.    Assessment/Plan:      * Osteomyelitis of foot  73M admitted 11/27 from NH for b/l wounds.   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   Wound swab of R ulcer with MRSA.   ID, podiatry consulted.    s/p bone biopsy and debridement by podiatry   cultures + s aureus   continue local wound care   Continue antibiotics per Infectious Disease recommendations.  Anticipate 6 wks of IV Vanc  Will need PICC line    Antibiotics (From admission, onward)      Start     Stop Route Frequency Ordered    11/29/23 1307  vancomycin  - pharmacy to dose  (vancomycin IVPB (PEDS and ADULTS))        See [facility] for full Linked Orders Report.    -- IV pharmacy to manage frequency 11/29/23 1207                Multiple wounds  History of multiple decubitus ulcers, previous buttock ulcers now healed  Has bilateral heel wound and lat rt foot  CRP 57.8,Sed rate 83,with procalcitonin elevated at 6.09  Podiatry and ID consulted  -S/p OR debridement/ bone biopsy B/L feet.Left heel and lateral foot with necrotic base. Abx beads in place to all wounds. DSD applied.   -wound cultures growing MRSA. Started on vancomycin/cefepime/flagyl per id while pending cultures.  Cefepime now discontinued   -Vascular surgery consulted.  Angiogram on 12/06  -continue wound care  -will need SNF placement for ongoing IV antibiotics (anticipated for 6 weeks) and wound care    Acute delirium  -patient with some withdrawn or fighting behavior.  -Continue correction of metabolic derangements  -Maintain Delirium precautions: Maintain regular sleep cycle. Early ambulation. Minimal interruptions overnight. Re-orient patient frequently. Maintain adequate bowel regimen, hydration and electrolyte replenishments. Hearing Aids and eye glasses as needed.      Atherosclerosis of native artery of extremity  Vascular consulted  S/p angiogram on 12/6  No role for revascularization and no intervention performed       Chronic diastolic heart failure  History of grade 3 diastolic dysfunction  Does not seem to be in acute diastolic heart failure exacerbation  Will continue twice daily furosemide, with metolazone        UTI (urinary tract infection)  Complained of urgency and straining at micturition  UA showed cloudy urine,with 3+ leukocyte esterase,rbc 66,wbc>100 and moderate bacteria  Currently symptoms has abaited, proteus growing in urine with ceftriaxone sensitive.  -continue abx    Paroxysmal atrial flutter  - His of paroxysmal atrial fibrillation with multiple MARIA and cardiovesion  -  Currently on amiodarone and metoprolol 50xl  - Cont apixan due to risk of thromboembolic stroke  - continue to monitor on telemetry   - Maintain K > 4, Mg > 2        BPH (benign prostatic hyperplasia)  Stable  Will consider tamsulosin for symptomatic treatment      Stage 3b chronic kidney disease (CKD)  Creatine stable for now. BMP reviewed- noted Estimated Creatinine Clearance: 30.5 mL/min (A) (based on SCr of 2.3 mg/dL (H)). according to latest data. Based on current GFR, CKD stage is stage 3 - GFR 30-59.    Monitor UOP and serial BMP and adjust therapy as needed. Renally dose meds.   Avoid nephrotoxic medications and procedures.    Left knee pain  History of chronic left knee pain  Xray showed effusion  Septic arthritis vs gout vs effusion  Knee tap attempted, but minimal fluid able to be collected. Ortho following, but feel this is unlikely septic  ID following.  Continue antibiotics as stated above      Alcohol abuse  History of alcohol use  Now in remission, has not had a drink in the recent past  Not at risk of withdrawal  Cont multivitamin,thiamine and folic acid      Hypokalemia  -labs reviewed  -potassium 3.1 on 12/06  -replete and monitor    GERD (gastroesophageal reflux disease)  - Stable    Anemia  Patient's anemia is currently uncontrolled. Has not received any PRBCs to date. Etiology likely d/t Iron deficiency and chronic disease due to Chronic Kidney Disease/ESRD  Current CBC reviewed-   Lab Results   Component Value Date    HGB 10.3 (L) 12/06/2023    HCT 34.7 (L) 12/06/2023   Will obtain iron panel and ferritin  Monitor serial CBC and transfuse if patient becomes hemodynamically unstable, symptomatic or H/H drops below 7/21.    Type 2 diabetes mellitus with kidney complication, with long-term current use of insulin  Patient's FSGs are controlled on current medication regimen.  Last A1c reviewed-   Lab Results   Component Value Date    HGBA1C 5.5 09/20/2023     Most recent fingerstick glucose  reviewed-   Recent Labs   Lab 12/06/23 1955 12/06/23 1957 12/07/23  0313 12/07/23  0812   POCTGLUCOSE 107 100 100 95       Current correctional scale  Low  Low hemoglobin A1C likely due to worsening kidney functions  Antihyperglycemics (From admission, onward)      Start     Stop Route Frequency Ordered    11/28/23 0900  insulin detemir U-100 (Levemir) pen 8 Units         -- SubQ 2 times daily 11/28/23 0241 11/28/23 0715  insulin aspart U-100 pen 2 Units         -- SubQ 3 times daily with meals 11/28/23 0241 11/28/23 0334  insulin aspart U-100 pen 0-5 Units         -- SubQ Before meals & nightly PRN 11/28/23 0241          Hold Oral hypoglycemics while patient is in the hospital.  Will start basal,bolus and correctional insulin as needed    Dyslipidemia  -Continue statin  -Stable,lifestyle modification      Essential hypertension  Chronic, uncontrolled. Latest blood pressure and vitals reviewed-     Temp:  [97.5 °F (36.4 °C)-98.6 °F (37 °C)]   Pulse:  [63-77]   Resp:  [10-23]   BP: (111-187)/(53-81)   SpO2:  [96 %-100 %] .   Home meds for hypertension were reviewed and noted below.   Hypertension Medications               furosemide (LASIX) 80 MG tablet Take 1 tablet (80 mg total) by mouth 2 (two) times daily.    metOLazone (ZAROXOLYN) 5 MG tablet Take 1 tablet (5 mg total) by mouth once daily.    metoprolol succinate (TOPROL-XL) 25 MG 24 hr tablet Take 25 mg by mouth once daily.            While in the hospital, will manage blood pressure as follows; Continue home antihypertensive regimen    Will utilize p.r.n. blood pressure medication only if patient's blood pressure greater than 180/110 and he develops symptoms such as worsening chest pain or shortness of breath.    Abdominal pain  -KUB on 12/5 shows stool burden  -stool softeners ordered .  Patient had a bowel movement on 12/06.  -continue stool softeners for now  - resolved.         VTE Risk Mitigation (From admission, onward)           Ordered      Place sequential compression device  Until discontinued         11/28/23 0241     Reason for no Mechanical VTE Prophylaxis  Once        Question:  Reasons:  Answer:  Physician Provided (leave comment)    11/28/23 0241     IP VTE HIGH RISK PATIENT  Once         11/28/23 0241                          I have completed this tele-visit without the assistance of a telepresenter.    The attending portion of this evaluation, treatment, and documentation was performed per Gerry Roy MD via Telemedicine AudioVisual using the secure [employer] software platform with 2 way audio/video. The provider was located off-site and the patient is located in the hospital. The aforementioned video software was utilized to document the relevant history and physical exam    Gerry Roy MD  Department of Hospital Medicine   [facility]

## 2023-12-07 NOTE — ASSESSMENT & PLAN NOTE
73M admitted 11/27 from NH for b/l wounds.   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   Wound swab of R ulcer with MRSA.   ID, podiatry consulted.    s/p bone biopsy and debridement by podiatry   cultures + s aureus   continue local wound care   Continue antibiotics per Infectious Disease recommendations.  Anticipate 6 wks of IV Vanc  Will need PICC line    Antibiotics (From admission, onward)    Start     Stop Route Frequency Ordered    11/29/23 1307  vancomycin - pharmacy to dose  (vancomycin IVPB (PEDS and ADULTS))        See [facility] for full Linked Orders Report.    -- IV pharmacy to manage frequency 11/29/23 6188

## 2023-12-07 NOTE — TELEMEDICINE CONSULT
[facility]  Vascular Neurology  Comprehensive Stroke Center  TeleVascular Neurology Acute Consultation Note        Consult Information  Consults    Consulting Provider: CJ PATRICIA   Current Providers  No providers found    Patient Location:  [facility] ICU IP Unit    Spoke hospital nurse at bedside with patient assisting consultant.  Patient information was obtained from past medical records and primary team.       Stroke Documentation  Acute Stroke Times   Last Known Normal Date: 12/06/23  Unknown Normal Time: Unknown Time  Unknown Symptom Onset Date: Unknown Date  Unknown Symptom Onset Time: Unknown Time  Stroke Team Called Date: 12/07/23  Stroke Team Called Time: 1052  Stroke Team Arrival Date: 12/07/23  Stroke Team Arrival Time: 1056  CT Interpretation Time: 1058  Thrombolytic Therapy Recommended: No  CTA Interpretation Time:  (Recommended vessel imaging)    NIH Scale:  1a. Level of Consciousness: 2-->Not alert, requires repeated stimulation to attend, or is obtunded and requires strong or painful stimulation to make movements (not stereotyped)  1b. LOC Questions: 2-->Answers neither question correctly  1c. LOC Commands: 2-->Performs neither task correctly  2. Best Gaze: 0-->Normal  3. Visual: 0-->No visual loss  4. Facial Palsy: 0-->Normal symmetrical movements  5a. Motor Arm, Left: 2-->Some effort against gravity, limb cannot get to or maintain (if cued) 90 (or 45) degrees, drifts down to bed, but has some effort against gravity (moving spontenously)  5b. Motor Arm, Right: 2-->Some effort against gravity, limb cannot get to or maintain (if cued) 90 (or 45) degrees, drifts down to bed, but has some effort against gravity (moving spontenously)  6a. Motor Leg, Left: 3-->No effort against gravity, leg falls to bed immediately  6b. Motor Leg, Right: 4-->No movement  7. Limb Ataxia: 0-->Absent  8. Sensory: 0-->Normal, no sensory loss  9. Best Language: 3-->Mute, global aphasia, no usable  "speech or auditory comprehension  10. Dysarthria: (UN) Intubated or other physical barrier  11. Extinction and Inattention (formerly Neglect): 0-->No abnormality  Total (NIH Stroke Scale): 20      Modified [place]: Score: 3  Kiefer Coma Scale:     ABCD2 Score:    [id number] Score:    HAS -BLED Score:    ICH Score:    Hunt & Dominguez Classification:      Blood pressure (!) 187/80, pulse 77, temperature 98.6 °F (37 °C), temperature source Oral, resp. rate 18, height 5' 11" (1.803 m), weight 81 kg (178 lb 9.2 oz), SpO2 99 %.    Diagnoses  Problem Noted   Left Leg Weakness 12/7/2023       Medical Decision Making  HPI:  73 y.o. male w/ PMHX of DM II, GERD, A flutter ( previous on Eliquis, unclear as to why discontinued), bl LE wounds, HTN, HLD,CKD, CHF who is admitted for eval of B/L foot wounds, s/p angio on 12/06.   Noted to be less responsive "sometime after the angio".   RR called around 3:15 AM.   This AM patient lethargic, not following commands w/ decreased movement to the LUE as per physician at bedside,        Images personally reviewed and interpreted:  Study: Head CT  Study Interpretation: No acute intracranial abnormalities, severe chronic microvascular changes.     Assessment and plan:  Acute encephalopathy, progressive w/ decreased movement to the LLE.   LLE appears edematous when compared to the R.   Unknown LKW.   Patient not on AC despite Aflutter/Afib Dx.     Recommend STAT vessel imaging as well as MRI Brain w/o.     Not a lytic candidate based on unknown LKW.     Consider restarting AC if no contraindications.     Lytics recommendation: Thrombolytic therapy not recommended due to Outside of treatment window  and Unknown/unclear onset   Thrombectomy recommendation: Awaiting CTA results from [facility] for determination   Placement recommendation: pending further studies  remains as inpatient at [facility]                Past Medical History:   Diagnosis Date    Acute congestive heart failure 3/20/2020    Alcohol " abuse     Arthritis     Asthma attack 11/24/2021    Coronary artery disease     Diabetes mellitus     Hyperlipemia     Hypertension     Multiple thyroid nodules 6/14/2023    Rhabdomyolysis      Past Surgical History:   Procedure Laterality Date    ECHOCARDIOGRAM,TRANSESOPHAGEAL N/A 9/21/2023    Procedure: Transesophageal echo (MARIA) intra-procedure log documentation;  Surgeon: Luisana Rodríguez MD;  Location: [facility] CATH LAB;  Service: Cardiology;  Laterality: N/A;    EYE SURGERY      IRRIGATION AND DEBRIDEMENT Bilateral 12/1/2023    Procedure: IRRIGATION AND DEBRIDEMENT;  Surgeon: Jenna Gutiérrez DPM;  Location: [facility] OR;  Service: Podiatry;  Laterality: Bilateral;  Request antibiotic beads    TRANSESOPHAGEAL ECHOCARDIOGRAM WITH POSSIBLE CARDIOVERSION (MARIA W/ POSS CARDIOVERSION) N/A 1/5/2023    Procedure: Transesophageal echo (MARIA) intra-procedure log documentation;  Surgeon: Indra Foote MD;  Location: [facility] CATH LAB;  Service: Cardiology;  Laterality: N/A;    TRANSESOPHAGEAL ECHOCARDIOGRAPHY N/A 9/21/2023    Procedure: ECHOCARDIOGRAM, TRANSESOPHAGEAL;  Surgeon: Luisana Rodríguez MD;  Location: [facility] CATH LAB;  Service: Cardiology;  Laterality: N/A;    TREATMENT OF CARDIAC ARRHYTHMIA N/A 12/7/2020    Procedure: Cardioversion or Defibrillation;  Surgeon: Indra Foote MD;  Location: [facility] CATH LAB;  Service: Cardiology;  Laterality: N/A;    TREATMENT OF CARDIAC ARRHYTHMIA N/A 12/30/2020    Procedure: Cardioversion or Defibrillation;  Surgeon: Tyrone Steen MD;  Location: [facility] CATH LAB;  Service: Cardiology;  Laterality: N/A;    TREATMENT OF CARDIAC ARRHYTHMIA N/A 1/5/2023    Procedure: Cardioversion or Defibrillation;  Surgeon: Indra Foote MD;  Location: [facility] CATH LAB;  Service: Cardiology;  Laterality: N/A;    VASCULAR SURGERY       Family History   Problem Relation Age of Onset    Hypertension Mother     Diabetes Mother     Diabetes Father     Hypertension Father     Diabetes Sister      Hypertension Sister            Jesika Alexandra MD      Emergent/Acute neurological consultation requested by spoke provider due to critical concerns for possible cerebrovascular event that could result in permanent loss of neurologic/bodily function, severe disability or death of this patient.  Immediate/timely evaluation by a highly prepared expert is paramount for optimal outcomes  High risk for neurological deterioration if not properly diagnosed  High risk for neurological deterioration if not treated promplty/as soon as possible  Complex diagnostic evaluation may be required (advanced imaging)  High risk treatment options (thrombolytics and/or thrombectomy)    Patient care was coordinated with spoke provider, including but not limted to    Discussing likely diagnosis/etiology of symptoms  Making recommendations for further diagnostic studies  Making recommendations for intravenous thrombolytics or other advanced therapies  Making recommendations for disposition (admission/transfer for higher level of care)

## 2023-12-07 NOTE — ASSESSMENT & PLAN NOTE
Creatine stable for now. BMP reviewed- noted Estimated Creatinine Clearance: 28 mL/min (A) (based on SCr of 2.5 mg/dL (H)). according to latest data. Based on current GFR, CKD stage is stage 3 - GFR 30-59.    Monitor UOP and serial BMP and adjust therapy as needed. Renally dose meds.   Avoid nephrotoxic medications and procedures.  - Cr slightly worse today, Cr 2.5 - started on gentle IVF - unable to get CTA h/n

## 2023-12-07 NOTE — ASSESSMENT & PLAN NOTE
Chronic, uncontrolled. Latest blood pressure and vitals reviewed-     Temp:  [97.5 °F (36.4 °C)-98.6 °F (37 °C)]   Pulse:  [63-77]   Resp:  [10-23]   BP: (111-187)/(53-81)   SpO2:  [96 %-100 %] .   Home meds for hypertension were reviewed and noted below.   Hypertension Medications               furosemide (LASIX) 80 MG tablet Take 1 tablet (80 mg total) by mouth 2 (two) times daily.    metOLazone (ZAROXOLYN) 5 MG tablet Take 1 tablet (5 mg total) by mouth once daily.    metoprolol succinate (TOPROL-XL) 25 MG 24 hr tablet Take 25 mg by mouth once daily.            While in the hospital, will manage blood pressure as follows; Continue home antihypertensive regimen    Will utilize p.r.n. blood pressure medication only if patient's blood pressure greater than 180/110 and he develops symptoms such as worsening chest pain or shortness of breath.

## 2023-12-07 NOTE — HOSPITAL COURSE
Mr. Bowie is a 72 yo M who was admitted to the hospital for evaluation of bilateral heel decubitus ulcers concerning for osteomyelitis. Also with worsening left knee pain with swelling.  Urinalysis also concern for infection.  Orthopedic surgery was consulted for evaluation of left knee.  X-ray noted to have low wrist effusion however unable to tap much fluid.  Do not suspect infectious.  Most likely arthritic.  Podiatry was consulted for bilateral heel wounds and he underwent MRI which showed bilateral heel osteomyelitis with left 5th metatarsal osteomyelitis and possible right 5th osteomyelitis.  Initially started on broad-spectrum antibiotics and Infectious Disease consulted.  Urine culture with Proteus mirabilis, right foot wound with MRSA.  Patient underwent debridement of his bilateral heels and 5th metatarsals with bone cultures taken.  Left foot bone culture growing staph aureus.  Vascular surgery consulted for evaluation of poor healing in the left lower extremity.  He underwent angiogram on 12/06 with patent flow.  Remains on vancomycin.  We will need at least 6 weeks of IV antibiotics given osteomyelitis.  Patient developed rapid response on 12/07 early a.m. for concerns for unresponsiveness.  He was given Narcan with mild improvement.  Later that day, patient continued to be unresponsive with left-sided weakness.  Code stroke called and patient underwent CT head with no acute changes.  Recommendations from telemedicine neurology include CTA head and neck however given patient's worsening renal function unable to do this. Pt's mentation is now resolved.     Pt is now s/p  BLE heel I&D w podiatry. S/p LLE angiogram 12/6 with Obinna Reid, no role for revascularization and no intervention performed at that time. Recommend continue conservative management, wound care, and heel off loading. ID rec'd daptomycin for 6 weeks. Deemed stable to return [facility] for 6 weeks of iv daptomycin

## 2023-12-07 NOTE — SUBJECTIVE & OBJECTIVE
"HPI:  73 y.o. male w/ PMHX of DM II, GERD, A flutter ( previous on Eliquis, unclear as to why discontinued), bl LE wounds, HTN, HLD,CKD, CHF who is admitted for eval of B/L foot wounds, s/p angio on 12/06.   Noted to be less responsive "sometime after the angio".   RR called around 3:15 AM.   This AM patient lethargic, not following commands w/ decreased movement to the LUE as per physician at bedside,        Images personally reviewed and interpreted:  Study: Head CT  Study Interpretation: No acute intracranial abnormalities, severe chronic microvascular changes.     Assessment and plan:  Acute encephalopathy, progressive w/ decreased movement to the LLE.   LLE appears edematous when compared to the R.   Unknown LKW.   Patient not on AC despite Aflutter/Afib Dx.     Recommend STAT vessel imaging as well as MRI Brain w/o.     Not a lytic candidate based on unknown LKW.     Consider restarting AC if no contraindications.     Lytics recommendation: Thrombolytic therapy not recommended due to Outside of treatment window  and Unknown/unclear onset   Thrombectomy recommendation: Awaiting CTA results from [facility] for determination   Placement recommendation: pending further studies  remains as inpatient at [facility]              "

## 2023-12-07 NOTE — ASSESSMENT & PLAN NOTE
Patient's FSGs are controlled on current medication regimen.  Last A1c reviewed-   Lab Results   Component Value Date    HGBA1C 5.5 09/20/2023     Most recent fingerstick glucose reviewed-   Recent Labs   Lab 12/06/23 1955 12/06/23 1957 12/07/23 0313 12/07/23  0812   POCTGLUCOSE 107 100 100 95       Current correctional scale  Low  Low hemoglobin A1C likely due to worsening kidney functions  Antihyperglycemics (From admission, onward)    Start     Stop Route Frequency Ordered    11/28/23 0900  insulin detemir U-100 (Levemir) pen 8 Units         -- SubQ 2 times daily 11/28/23 0241    11/28/23 0715  insulin aspart U-100 pen 2 Units         -- SubQ 3 times daily with meals 11/28/23 0241 11/28/23 0334  insulin aspart U-100 pen 0-5 Units         -- SubQ Before meals & nightly PRN 11/28/23 0241        Hold Oral hypoglycemics while patient is in the hospital.  Will start basal,bolus and correctional insulin as needed

## 2023-12-07 NOTE — ASSESSMENT & PLAN NOTE
Patient's anemia is currently uncontrolled. Has not received any PRBCs to date. Etiology likely d/t Iron deficiency and chronic disease due to Chronic Kidney Disease/ESRD  Current CBC reviewed-   Lab Results   Component Value Date    HGB 10.3 (L) 12/06/2023    HCT 34.7 (L) 12/06/2023   Will obtain iron panel and ferritin  Monitor serial CBC and transfuse if patient becomes hemodynamically unstable, symptomatic or H/H drops below 7/21.

## 2023-12-07 NOTE — ASSESSMENT & PLAN NOTE
73M admitted 11/27 from NH for b/l wounds.   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   Wound swab of R ulcer with MRSA.   ID, podiatry consulted.    s/p bone biopsy and debridement by podiatry   cultures + s aureus   continue local wound care   Continue antibiotics per Infectious Disease recommendations.  Anticipate 6 wks of IV Vanc  Will need PICC line    Antibiotics (From admission, onward)      Start     Stop Route Frequency Ordered    11/29/23 1307  vancomycin - pharmacy to dose  (vancomycin IVPB (PEDS and ADULTS))        See Hyperspace for full Linked Orders Report.    -- IV pharmacy to manage frequency 11/29/23 8748

## 2023-12-07 NOTE — PLAN OF CARE
Transferred to Dr. Phelan/[facility] from [facility]. He is going to CT then to ICU I was told.  he also had a rapid called last night.

## 2023-12-07 NOTE — NURSING
Patient is snoring loudly with eyes wide open whem nurse enters the room. Patient name is called and patient is sternal rubbed, no response to either stimuli. VS measured, stable. Rapid response and Dr. Osborne called to bedside. Narcan given per verbal from Dr. Osborne.

## 2023-12-07 NOTE — ASSESSMENT & PLAN NOTE
Patient's anemia is currently uncontrolled. Has not received any PRBCs to date. Etiology likely d/t Iron deficiency and chronic disease due to Chronic Kidney Disease/ESRD  Current CBC reviewed-   Lab Results   Component Value Date    HGB 9.8 (L) 12/07/2023    HCT 33.0 (L) 12/07/2023   Will obtain iron panel and ferritin  Monitor serial CBC and transfuse if patient becomes hemodynamically unstable, symptomatic or H/H drops below 7/21.

## 2023-12-07 NOTE — ASSESSMENT & PLAN NOTE
-KUB on 12/5 shows stool burden  -stool softeners ordered .  Patient had a bowel movement on 12/06.  -continue stool softeners for now  - resolved.

## 2023-12-08 VITALS
TEMPERATURE: 98 F | DIASTOLIC BLOOD PRESSURE: 67 MMHG | HEART RATE: 66 BPM | BODY MASS INDEX: 25 KG/M2 | OXYGEN SATURATION: 99 % | HEIGHT: 71 IN | WEIGHT: 178.56 LBS | RESPIRATION RATE: 18 BRPM | SYSTOLIC BLOOD PRESSURE: 146 MMHG

## 2023-12-08 PROBLEM — I70.223 CRITICAL LIMB ISCHEMIA OF BOTH LOWER EXTREMITIES: Status: ACTIVE | Noted: 2023-12-08

## 2023-12-08 LAB
ALBUMIN SERPL BCP-MCNC: 2.7 G/DL (ref 3.5–5.2)
ALP SERPL-CCNC: 83 U/L (ref 55–135)
ALT SERPL W/O P-5'-P-CCNC: 14 U/L (ref 10–44)
ANION GAP SERPL CALC-SCNC: 10 MMOL/L (ref 8–16)
AST SERPL-CCNC: 22 U/L (ref 10–40)
BACTERIA SPEC AEROBE CULT: ABNORMAL
BILIRUB SERPL-MCNC: 0.4 MG/DL (ref 0.1–1)
BUN SERPL-MCNC: 40 MG/DL (ref 8–23)
CALCIUM SERPL-MCNC: 9.9 MG/DL (ref 8.7–10.5)
CHLORIDE SERPL-SCNC: 101 MMOL/L (ref 95–110)
CK SERPL-CCNC: 80 U/L (ref 20–200)
CO2 SERPL-SCNC: 29 MMOL/L (ref 23–29)
CREAT SERPL-MCNC: 2.5 MG/DL (ref 0.5–1.4)
EST. GFR  (NO RACE VARIABLE): 26 ML/MIN/1.73 M^2
GLUCOSE SERPL-MCNC: 75 MG/DL (ref 70–110)
POCT GLUCOSE: 75 MG/DL (ref 70–110)
POCT GLUCOSE: 76 MG/DL (ref 70–110)
POTASSIUM SERPL-SCNC: 3.2 MMOL/L (ref 3.5–5.1)
PROT SERPL-MCNC: 6.8 G/DL (ref 6–8.4)
SARS-COV-2 RDRP RESP QL NAA+PROBE: NEGATIVE
SODIUM SERPL-SCNC: 140 MMOL/L (ref 136–145)

## 2023-12-08 PROCEDURE — 99233 SBSQ HOSP IP/OBS HIGH 50: CPT | Mod: ,,, | Performed by: NURSE PRACTITIONER

## 2023-12-08 PROCEDURE — U0002 COVID-19 LAB TEST NON-CDC: HCPCS | Performed by: FAMILY MEDICINE

## 2023-12-08 PROCEDURE — 25000003 PHARM REV CODE 250: Performed by: HOSPITALIST

## 2023-12-08 PROCEDURE — 82550 ASSAY OF CK (CPK): CPT | Performed by: INTERNAL MEDICINE

## 2023-12-08 PROCEDURE — A4216 STERILE WATER/SALINE, 10 ML: HCPCS | Performed by: HOSPITALIST

## 2023-12-08 PROCEDURE — 25000003 PHARM REV CODE 250: Performed by: SURGERY

## 2023-12-08 PROCEDURE — 25000003 PHARM REV CODE 250: Performed by: FAMILY MEDICINE

## 2023-12-08 PROCEDURE — 25000003 PHARM REV CODE 250: Performed by: STUDENT IN AN ORGANIZED HEALTH CARE EDUCATION/TRAINING PROGRAM

## 2023-12-08 PROCEDURE — 80053 COMPREHEN METABOLIC PANEL: CPT | Performed by: STUDENT IN AN ORGANIZED HEALTH CARE EDUCATION/TRAINING PROGRAM

## 2023-12-08 PROCEDURE — 63600175 PHARM REV CODE 636 W HCPCS: Performed by: SURGERY

## 2023-12-08 PROCEDURE — 25000003 PHARM REV CODE 250: Performed by: INTERNAL MEDICINE

## 2023-12-08 PROCEDURE — 63600175 PHARM REV CODE 636 W HCPCS: Performed by: INTERNAL MEDICINE

## 2023-12-08 PROCEDURE — 36415 COLL VENOUS BLD VENIPUNCTURE: CPT | Performed by: STUDENT IN AN ORGANIZED HEALTH CARE EDUCATION/TRAINING PROGRAM

## 2023-12-08 PROCEDURE — 99233 PR SUBSEQUENT HOSPITAL CARE,LEVL III: ICD-10-PCS | Mod: ,,, | Performed by: NURSE PRACTITIONER

## 2023-12-08 RX ORDER — POTASSIUM CHLORIDE 20 MEQ/1
40 TABLET, EXTENDED RELEASE ORAL ONCE
Status: COMPLETED | OUTPATIENT
Start: 2023-12-08 | End: 2023-12-08

## 2023-12-08 RX ADMIN — DAPTOMYCIN 650 MG: 350 INJECTION, POWDER, LYOPHILIZED, FOR SOLUTION INTRAVENOUS at 08:12

## 2023-12-08 RX ADMIN — Medication 10 ML: at 11:12

## 2023-12-08 RX ADMIN — Medication 10 ML: at 06:12

## 2023-12-08 RX ADMIN — FOLIC ACID 1 MG: 1 TABLET ORAL at 08:12

## 2023-12-08 RX ADMIN — SODIUM CHLORIDE, POTASSIUM CHLORIDE, SODIUM LACTATE AND CALCIUM CHLORIDE: 600; 310; 30; 20 INJECTION, SOLUTION INTRAVENOUS at 09:12

## 2023-12-08 RX ADMIN — TAMSULOSIN HYDROCHLORIDE 0.4 MG: 0.4 CAPSULE ORAL at 08:12

## 2023-12-08 RX ADMIN — INSULIN DETEMIR 8 UNITS: 100 INJECTION, SOLUTION SUBCUTANEOUS at 08:12

## 2023-12-08 RX ADMIN — INSULIN ASPART 2 UNITS: 100 INJECTION, SOLUTION INTRAVENOUS; SUBCUTANEOUS at 08:12

## 2023-12-08 RX ADMIN — Medication 10 ML: at 12:12

## 2023-12-08 RX ADMIN — APIXABAN 5 MG: 5 TABLET, FILM COATED ORAL at 08:12

## 2023-12-08 RX ADMIN — AMIODARONE HYDROCHLORIDE 200 MG: 200 TABLET ORAL at 08:12

## 2023-12-08 RX ADMIN — ASPIRIN 81 MG CHEWABLE TABLET 81 MG: 81 TABLET CHEWABLE at 08:12

## 2023-12-08 RX ADMIN — POTASSIUM CHLORIDE 40 MEQ: 1500 TABLET, EXTENDED RELEASE ORAL at 10:12

## 2023-12-08 RX ADMIN — FUROSEMIDE 40 MG: 40 TABLET ORAL at 08:12

## 2023-12-08 RX ADMIN — FERROUS SULFATE TAB 325 MG (65 MG ELEMENTAL FE) 1 EACH: 325 (65 FE) TAB at 08:12

## 2023-12-08 RX ADMIN — METOPROLOL SUCCINATE 12.5 MG: 25 TABLET, EXTENDED RELEASE ORAL at 08:12

## 2023-12-08 NOTE — NURSING
Received report care assumed. Patient lying in bed resting, NAD noted. Safety precautions maintained.    [facility]  Nurses Note -- 4 Eyes      12/7/2023       Skin assessed on: Q Shift      [] No Pressure Injuries Present    []Prevention Measures Documented    [x] Yes LDA  for Pressure Injury Previously documented     [] Yes New Pressure Injury Discovered   [] LDA for New Pressure Injury Added      Attending RN:  Ethel Hernandez LPN     Second RN:  Hannah Lopez RN

## 2023-12-08 NOTE — DISCHARGE SUMMARY
[facility] Medicine  Discharge Summary      Patient Name: Chato Bowie  MRN: [id number]  [facility]: [id number]  Patient Class: IP- Inpatient  Admission Date: 11/27/2023  Hospital Length of Stay: 11 days  Discharge Date and Time:  12/08/2023 3:11 PM  Attending Physician: Nydia Hernandez MD   Discharging Provider: Nydia Hernandez MD  Primary Care Provider: Irlanda Valenzuela -    Primary Care Team: Networked reference to record PCT     HPI:   73-year-old  male,current a nursing home resident,who was brought to the ED for evaluation after receiving x-ray imaging obtained 5 days earlier with concern for possible osteomyelitis,prior to obtaining the knee and foot x-ray,he had complained of left knee pain,he also has bilateral heel decubitus ulcers,wound is covered with escar,and can not be staged,although it was reported that he has osteomyelitis but the xray obtained did not show evidence of osteo per report,pain in the knee is sharp,non radiating,could be 10 out 10 in severity some time,currently only able to move around in a wheel chair,he denied fever,chills or rigor,although reports straining at micturition and urgency but denied dysuria,frequency or hematuria.He denied chest pain,shortness of breath,or orthopnea,although he has chronic bilateral lower extremities swelling,worse in the left lower extremity compared to the right,he denied previous history of lower extremity deep vein thrombosis.He denied change in bowel habit,no diarrhea or constipation.  His medical history os significant for type 2 DM,hypertension,hyperlipidemia,heart failure,coronary artery disease, hyperlipidemia,alcohol abuse,CKD 3b and multiple thyroid nodules.labs obtained showed elevated acute phase reactant with sed rate 83,procalcitonin 6.09,crp 57.8,but x-ray did not show evidence, will need MRI to confirm early osteomyelitis.    Procedure(s) (LRB):  IRRIGATION AND DEBRIDEMENT (Bilateral)       Hospital Course:   Mr. Bowie is a 72 yo M who was admitted to the hospital for evaluation of bilateral heel decubitus ulcers concerning for osteomyelitis. Also with worsening left knee pain with swelling.  Urinalysis also concern for infection.  Orthopedic surgery was consulted for evaluation of left knee.  X-ray noted to have low wrist effusion however unable to tap much fluid.  Do not suspect infectious.  Most likely arthritic.  Podiatry was consulted for bilateral heel wounds and he underwent MRI which showed bilateral heel osteomyelitis with left 5th metatarsal osteomyelitis and possible right 5th osteomyelitis.  Initially started on broad-spectrum antibiotics and Infectious Disease consulted.  Urine culture with Proteus mirabilis, right foot wound with MRSA.  Patient underwent debridement of his bilateral heels and 5th metatarsals with bone cultures taken.  Left foot bone culture growing staph aureus.  Vascular surgery consulted for evaluation of poor healing in the left lower extremity.  He underwent angiogram on 12/06 with patent flow.  Remains on vancomycin.  We will need at least 6 weeks of IV antibiotics given osteomyelitis.  Patient developed rapid response on 12/07 early a.m. for concerns for unresponsiveness.  He was given Narcan with mild improvement.  Later that day, patient continued to be unresponsive with left-sided weakness.  Code stroke called and patient underwent CT head with no acute changes.  Recommendations from telemedicine neurology include CTA head and neck however given patient's worsening renal function unable to do this. Pt's mentation is now resolved.     Pt is now s/p  BLE heel I&D w podiatry. S/p LLE angiogram 12/6 with Obinna Reid, no role for revascularization and no intervention performed at that time. Recommend continue conservative management, wound care, and heel off loading. ID rec'd daptomycin for 6 weeks. Deemed stable to return [facility] for 6 weeks of iv  daptomycin     Goals of Care Treatment Preferences:  Code Status: Full Code    Health care agent: Leigh Evans  Health care agent number: [phone]          What is most important right now is to focus on avoiding the hospital, remaining as independent as possible, symptom/pain control, extending life as long as possible, even it it means sacrificing quality.  Accordingly, we have decided that the best plan to meet the patient's goals includes continuing with treatment.      Consults:   Consults (From admission, onward)          Status Ordering Provider     Inpatient consult to PICC team ([facility])  Once        Provider:  (Not yet assigned)    Acknowledged YANIV LOPEZ     Inpatient consult to Social Work  Once        Provider:  (Not yet assigned)    Acknowledged YANIV LOPEZ     Inpatient consult to PICC team ([facility])  Once        Provider:  (Not yet assigned)    Acknowledged CJ PATRICIA     Inpatient virtual consult to Hospital Medicine  Once        Provider:  Nhung Martínez MD    Completed BIRDIE EVANS III     Inpatient consult to Infectious Diseases  Once        Provider:  Sasha Goldman MD    Completed BIRDIE EVANS III     Inpatient consult to Orthopedic Surgery  Once        Provider:  Miguel Dasilva MD    Completed BIRDIE EVANS III     Inpatient consult to Vascular Surgery  Once        Provider:  Osei Yeboah MD    Completed JENNA GUTIÉRREZ     Inpatient consult to Podiatry  Once        Provider:  Jenna Gutiérrez DPM    Completed KIM DIGGS            Neuro  Acute delirium  -patient with some withdrawn or fighting behavior.  -Continue correction of metabolic derangements  -Maintain Delirium precautions: Maintain regular sleep cycle. Early ambulation. Minimal interruptions overnight. Re-orient patient frequently. Maintain adequate bowel regimen, hydration and electrolyte replenishments. Hearing Aids and eye glasses as needed.      Acute  encephalopathy  Developmental status changes over the night of 12/6 2 12/7.  Initially concerned for stroke, code stroke called and underwent CT of head with no acute findings.  Telemedicine neurology consulted.  Recommending CTA of head and neck.  This was ordered however due to renal function and able to obtain at this time.  He is no focal deficits.  Holding opiates, Narcan as needed.  Lab work thus far unrevealing of etiology.  No other medications appear to cause encephalopathy.  Currently maintaining airway, awakening appropriately.    -seems to be resolved         Psychiatric  Alcohol abuse  History of alcohol use  Now in remission, has not had a drink in the recent past  Not at risk of withdrawal  Cont multivitamin,thiamine and folic acid      Cardiac/Vascular  Atherosclerosis of native artery of extremity  Vascular consulted  S/p angiogram on 12/6  No role for revascularization and no intervention performed       Chronic diastolic heart failure  History of grade 3 diastolic dysfunction  Does not seem to be in acute diastolic heart failure exacerbation  Will continue furosemide, with metolazone - currently on hold for ELIZABETH        Paroxysmal atrial flutter  - His of paroxysmal atrial fibrillation with multiple MARIA and cardiovesion  - Currently on amiodarone and metoprolol 50xl  - Cont apixan due to risk of thromboembolic stroke  - continue to monitor on telemetry   - Maintain K > 4, Mg > 2        Dyslipidemia  -Continue statin  -Stable,lifestyle modification      Essential hypertension  Chronic, uncontrolled. Latest blood pressure and vitals reviewed-     Temp:  [97.5 °F (36.4 °C)-98.3 °F (36.8 °C)]   Pulse:  [66-73]   Resp:  [17-20]   BP: (137-157)/(62-74)   SpO2:  [97 %-100 %] .   Home meds for hypertension were reviewed and noted below.   Hypertension Medications               furosemide (LASIX) 80 MG tablet Take 1 tablet (80 mg total) by mouth 2 (two) times daily.    metOLazone (ZAROXOLYN) 5 MG tablet Take  1 tablet (5 mg total) by mouth once daily.    metoprolol succinate (TOPROL-XL) 25 MG 24 hr tablet Take 25 mg by mouth once daily.            While in the hospital, will manage blood pressure as follows; Continue home antihypertensive regimen    Will utilize p.r.n. blood pressure medication only if patient's blood pressure greater than 180/110 and he develops symptoms such as worsening chest pain or shortness of breath.    Renal/  UTI (urinary tract infection)  Complained of urgency and straining at micturition  UA showed cloudy urine,with 3+ leukocyte esterase,rbc 66,wbc>100 and moderate bacteria  Currently symptoms has abaited, proteus growing in urine with ceftriaxone sensitive.  -finished course during hospitalizations    BPH (benign prostatic hyperplasia)  Stable  Will consider tamsulosin for symptomatic treatment      Stage 3b chronic kidney disease (CKD)  Creatine stable for now. BMP reviewed- noted Estimated Creatinine Clearance: 28 mL/min (A) (based on SCr of 2.5 mg/dL (H)). according to latest data. Based on current GFR, CKD stage is stage 3 - GFR 30-59.    Monitor UOP and serial BMP and adjust therapy as needed. Renally dose meds.   Avoid nephrotoxic medications and procedures.  - Cr slightly worse today, Cr 2.5 - started on gentle IVF - unable to get CTA h/n    Creat stable around 2.5    ID  * Osteomyelitis of foot  73M admitted 11/27 from NH for b/l wounds.   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   Wound swab of R ulcer with MRSA.   ID, podiatry consulted.    s/p bone biopsy and debridement by podiatry   cultures + s aureus   continue local wound care   Continue antibiotics per Infectious Disease recommendations.  Anticipate 6 wks of IV Vanc  Will need PICC line    Antibiotics (From admission, onward)      Start     Stop Route Frequency Ordered    12/08/23 0800  DAPTOmycin (CUBICIN) 650 mg in sodium chloride 0.9% SolP 50 mL IVPB         -- IV Every 48 hours (non-standard times) 12/07/23 2438                 Oncology  Anemia  Patient's anemia is currently uncontrolled. Has not received any PRBCs to date. Etiology likely d/t Iron deficiency and chronic disease due to Chronic Kidney Disease/ESRD  Current CBC reviewed-   Lab Results   Component Value Date    HGB 9.8 (L) 12/07/2023    HCT 33.0 (L) 12/07/2023   Will obtain iron panel and ferritin  Monitor serial CBC and transfuse if patient becomes hemodynamically unstable, symptomatic or H/H drops below 7/21.    Endocrine  Type 2 diabetes mellitus with kidney complication, with long-term current use of insulin  Patient's FSGs are controlled on current medication regimen.  Last A1c reviewed-   Lab Results   Component Value Date    HGBA1C 5.5 09/20/2023     Most recent fingerstick glucose reviewed-   Recent Labs   Lab 12/07/23  1639 12/07/23  2050 12/08/23  0749 12/08/23  1135   POCTGLUCOSE 138* 128* 76 75       Current correctional scale  Low  Low hemoglobin A1C likely due to worsening kidney functions  Antihyperglycemics (From admission, onward)      Start     Stop Route Frequency Ordered    11/28/23 0900  insulin detemir U-100 (Levemir) pen 8 Units         -- SubQ 2 times daily 11/28/23 0241    11/28/23 0715  insulin aspart U-100 pen 2 Units         -- SubQ 3 times daily with meals 11/28/23 0241    11/28/23 0334  insulin aspart U-100 pen 0-5 Units         -- SubQ Before meals & nightly PRN 11/28/23 0241          Hold Oral hypoglycemics while patient is in the hospital.  Will start basal,bolus and correctional insulin as needed    GI  Abdominal pain  -KUB on 12/5 shows stool burden  -stool softeners ordered .  Patient had a bowel movement on 12/06.  -continue stool softeners for now  - resolved.       Orthopedic  Weakness of left leg        Multiple wounds  History of multiple decubitus ulcers, previous buttock ulcers now healed  Has bilateral heel wound and lat rt foot  CRP 57.8,Sed rate 83,with procalcitonin elevated at 6.09  Podiatry and ID consulted  -S/p  OR debridement/ bone biopsy B/L feet.Left heel and lateral foot with necrotic base. Abx beads in place to all wounds. DSD applied.   -wound cultures growing MRSA. Started on vancomycin/cefepime/flagyl per id while pending cultures.  Cefepime now discontinued   -Vascular surgery consulted.  Angiogram on 12/06 without further interventions  -continue wound care  -accepted to SNF for ongoing IV antibiotics (anticipated for 6 weeks) and wound care      Final Active Diagnoses:    Diagnosis Date Noted POA    PRINCIPAL PROBLEM:  Osteomyelitis of foot [M86.9] 11/30/2023 Yes    Critical limb ischemia of both lower extremities [I70.223] 12/08/2023 Yes    Weakness of left leg [R29.898] 12/07/2023 Yes    Acute delirium [R41.0] 12/06/2023 No    Chronic diastolic heart failure [I50.32] 11/28/2023 Yes    Atherosclerosis of native artery of extremity [I70.209] 11/28/2023 Yes    Multiple wounds [T07.XXXA] 07/28/2023 Yes     Chronic    Acute encephalopathy [G93.40] 07/17/2023 Yes    UTI (urinary tract infection) [N39.0] 09/05/2021 Yes    Paroxysmal atrial flutter [I48.92] 12/07/2020 Yes     Chronic    BPH (benign prostatic hyperplasia) [N40.0] 11/24/2020 Yes    Stage 3b chronic kidney disease (CKD) [N18.32] 11/24/2020 Yes    Left knee pain [M25.562] 03/23/2020 Yes    Alcohol abuse [F10.10] 02/14/2019 Yes    Hypokalemia [E87.6] 02/14/2019 Yes    Type 2 diabetes mellitus with kidney complication, with long-term current use of insulin [E11.29, Z79.4] 03/10/2017 Not Applicable     Chronic    GERD (gastroesophageal reflux disease) [K21.9] 03/10/2017 Yes     Chronic    Essential hypertension [I10] 03/10/2017 Yes    Anemia [D64.9] 03/10/2017 Yes    Dyslipidemia [E78.5] 03/10/2017 Yes    Abdominal pain [R10.9] 03/07/2016 Yes      Problems Resolved During this Admission:    Diagnosis Date Noted Date Resolved POA    DJD (degenerative joint disease) [M19.90] 06/14/2023 11/30/2023 Yes       Discharged Condition: stable    Disposition: Skilled  Nursing Facility    Follow Up:   Follow-up Information       [facility] Follow up.    Why: SNF, Nursing Home  Contact information:  [address] [place] [id number] [phone]                         Patient Instructions:      ACCEPT - Ambulatory referral/consult to Interventional Cardiology   Standing Status: Future   Referral Priority: Routine Referral Type: Consultation   Referral Reason: Specialty Services Required   Requested Specialty: Interventional Cardiology   Number of Visits Requested: 1     Diet Adult Regular     Notify your health care provider if you experience any of the following:  temperature >100.4     Notify your health care provider if you experience any of the following:  persistent nausea and vomiting or diarrhea     Notify your health care provider if you experience any of the following:  severe uncontrolled pain     Activity as tolerated       Significant Diagnostic Studies: Labs: CMP   Recent Labs   Lab 12/07/23  0808 12/08/23  0730    140   K 3.8 3.2*   CL 98 101   CO2 24 29   GLU 91 75   BUN 43* 40*   CREATININE 2.5* 2.5*   CALCIUM 10.4 9.9   PROT 7.9 6.8   ALBUMIN 3.0* 2.7*   BILITOT 0.4 0.4   ALKPHOS 88 83   AST 27 22   ALT 24 14   ANIONGAP 15 10    and CBC   Recent Labs   Lab 12/07/23  0808   WBC 8.39   HGB 9.8*   HCT 33.0*          Pending Diagnostic Studies:       Procedure Component Value Units Date/Time    COVID-19 Routine Screening [[id number]] Collected: 12/08/23 1447    Order Status: Sent Lab Status: In process Updated: 12/08/23 1456    Specimen: Nasopharyngeal            Medications:  Reconciled Home Medications:      Medication List        CONTINUE taking these medications      albuterol 90 mcg/actuation inhaler  Commonly known as: PROVENTIL/VENTOLIN HFA  Inhale 2 puffs into the lungs every 6 (six) hours as needed for Wheezing or Shortness of Breath.     allopurinoL 100 MG tablet  Commonly known as: ZYLOPRIM  Take 1 tablet (100 mg  total) by mouth once daily.     amiodarone 200 MG Tab  Commonly known as: PACERONE  Take 200 mg by mouth once daily.     ELIQUIS 5 mg Tab  Generic drug: apixaban  Take 5 mg by mouth 2 (two) times daily.     ferrous sulfate 325 mg (65 mg iron) Tab tablet  Commonly known as: FEOSOL  Take 325 mg by mouth once daily.     folic acid 1 MG tablet  Commonly known as: FOLVITE  Take 1 mg by mouth once daily.     furosemide 80 MG tablet  Commonly known as: LASIX  Take 1 tablet (80 mg total) by mouth 2 (two) times daily.     gabapentin 100 MG capsule  Commonly known as: NEURONTIN  Take 1 capsule (100 mg total) by mouth 2 (two) times daily.     insulin aspart U-100 100 unit/mL injection  Commonly known as: NovoLOG  Inject into the skin. Per sliding scale     metOLazone 5 MG tablet  Commonly known as: ZAROXOLYN  Take 1 tablet (5 mg total) by mouth once daily.     metoprolol succinate 25 MG 24 hr tablet  Commonly known as: TOPROL-XL  Take 25 mg by mouth once daily.     ONE DAILY MULTIVITAMIN per tablet  Generic drug: multivitamin  Take 1 tablet by mouth once daily.     potassium chloride 10 MEQ Cpsr  Commonly known as: MICRO-K  Take 10 mEq by mouth once daily.     rosuvastatin 40 MG Tab  Commonly known as: CRESTOR  Take 40 mg by mouth every evening.     senna-docusate 8.6-50 mg 8.6-50 mg per tablet  Commonly known as: PERICOLACE  Take 2 tablets by mouth 2 (two) times daily as needed for Constipation.     tamsulosin 0.4 mg Cap  Commonly known as: FLOMAX  Take 1 capsule (0.4 mg total) by mouth once daily.     VITAMIN C 500 MG tablet  Generic drug: ascorbic acid (vitamin C)  Take 500 mg by mouth 2 (two) times daily.              Indwelling Lines/Drains at time of discharge:   Lines/Drains/Airways       Peripherally Inserted Central Catheter Line  Duration             PICC Double Lumen 12/07/23 1457 right brachial 1 day              Drain  Duration             Male External Urinary Catheter 12/05/23 0557 3 days                    Time  spent on the discharge of patient: >30 minutes         Nydia Hernandez MD  Department of Hospital Medicine  [facility] S[facility]

## 2023-12-08 NOTE — PLAN OF CARE
TN sent a secure chat to med surg nurseTiff, call report to [facility] [phone] nurse for room 219B. Transportation stretcher with O2 at 4:00PM    12/08/23 1452   Final Note   Assessment Type Final Discharge Note   Anticipated Discharge Disposition SNF   What phone number can be called within the next 1-3 days to see how you are doing after discharge?   (see chart)   Post-Acute Status   Post-Acute Authorization Placement   Post-Acute Placement Status Set-up Complete/Auth obtained   Patient choice form signed by patient/caregiver List with quality metrics by geographic area provided   Discharge Delays None known at this time     ADT 30 order placed for Stretcher Transportation.  Requested  time:4:00 PM [facility]  [phone]  If transportation does not arrive at ETA time nurse will be instructed to follow protocol for transportation below:  How can I get in touch directly with dispatch, if needed?                 Non-emergent (stretcher): [phone]  option 6     ++NURSING:  If Stretcher does not arrive at requested time please call the above Non Emergent Dispatcher.  If issue not resolved please escalate to your charge nurse for further instructions.

## 2023-12-08 NOTE — ASSESSMENT & PLAN NOTE
Creatine stable for now. BMP reviewed- noted Estimated Creatinine Clearance: 28 mL/min (A) (based on SCr of 2.5 mg/dL (H)). according to latest data. Based on current GFR, CKD stage is stage 3 - GFR 30-59.    Monitor UOP and serial BMP and adjust therapy as needed. Renally dose meds.   Avoid nephrotoxic medications and procedures.  - Cr slightly worse today, Cr 2.5 - started on gentle IVF - unable to get CTA h/n    Creat stable around 2.5

## 2023-12-08 NOTE — ASSESSMENT & PLAN NOTE
73M admitted 11/27 from NH for b/l wounds.   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   Wound swab of R ulcer with MRSA.   ID, podiatry consulted.    s/p bone biopsy and debridement by podiatry   cultures + s aureus   continue local wound care   Continue antibiotics per Infectious Disease recommendations.  Anticipate 6 wks of IV Vanc  Will need PICC line    Antibiotics (From admission, onward)    Start     Stop Route Frequency Ordered    12/08/23 0800  DAPTOmycin (CUBICIN) 650 mg in sodium chloride 0.9% SolP 50 mL IVPB         -- IV Every 48 hours (non-standard times) 12/07/23 2848

## 2023-12-08 NOTE — ASSESSMENT & PLAN NOTE
Patient's FSGs are controlled on current medication regimen.  Last A1c reviewed-   Lab Results   Component Value Date    HGBA1C 5.5 09/20/2023     Most recent fingerstick glucose reviewed-   Recent Labs   Lab 12/07/23  1639 12/07/23 2050 12/08/23  0749 12/08/23  1135   POCTGLUCOSE 138* 128* 76 75       Current correctional scale  Low  Low hemoglobin A1C likely due to worsening kidney functions  Antihyperglycemics (From admission, onward)    Start     Stop Route Frequency Ordered    11/28/23 0900  insulin detemir U-100 (Levemir) pen 8 Units         -- SubQ 2 times daily 11/28/23 0241    11/28/23 0715  insulin aspart U-100 pen 2 Units         -- SubQ 3 times daily with meals 11/28/23 0241    11/28/23 0334  insulin aspart U-100 pen 0-5 Units         -- SubQ Before meals & nightly PRN 11/28/23 0241        Hold Oral hypoglycemics while patient is in the hospital.  Will start basal,bolus and correctional insulin as needed

## 2023-12-08 NOTE — ASSESSMENT & PLAN NOTE
History of multiple decubitus ulcers, previous buttock ulcers now healed  Has bilateral heel wound and lat rt foot  CRP 57.8,Sed rate 83,with procalcitonin elevated at 6.09  Podiatry and ID consulted  -S/p OR debridement/ bone biopsy B/L feet.Left heel and lateral foot with necrotic base. Abx beads in place to all wounds. DSD applied.   -wound cultures growing MRSA. Started on vancomycin/cefepime/flagyl per id while pending cultures.  Cefepime now discontinued   -Vascular surgery consulted.  Angiogram on 12/06 without further interventions  -continue wound care  -accepted to SNF for ongoing IV antibiotics (anticipated for 6 weeks) and wound care

## 2023-12-08 NOTE — NURSING
[facility] [place]  Nurses Note -- 4 Eyes      12/7/2023       Skin assessed on: Q Shift      [x] No Pressure Injuries Present    [x]Prevention Measures Documented    [] Yes LDA  for Pressure Injury Previously documented     [] Yes New Pressure Injury Discovered   [] LDA for New Pressure Injury Added      Attending RN:  Hannah Lopez RN     Second RN:  Sameera Danielle RN

## 2023-12-08 NOTE — ASSESSMENT & PLAN NOTE
Developmental status changes over the night of 12/6 2 12/7.  Initially concerned for stroke, code stroke called and underwent CT of head with no acute findings.  Telemedicine neurology consulted.  Recommending CTA of head and neck.  This was ordered however due to renal function and able to obtain at this time.  He is no focal deficits.  Holding opiates, Narcan as needed.  Lab work thus far unrevealing of etiology.  No other medications appear to cause encephalopathy.  Currently maintaining airway, awakening appropriately.    -seems to be resolved

## 2023-12-08 NOTE — PLAN OF CARE
12/08/23 1000   Medicare Message   Important Message from Medicare regarding Discharge Appeal Rights Given to patient/caregiver;Explained to patient/caregiver;Signed/date by patient/caregiver;Other (comments)   Date IMM was signed 12/08/23   Time IMM was signed 1000     Cert. Mail [id number]

## 2023-12-08 NOTE — NURSING
Patient was assessed per RR nurse and provider and apparently noted that patient maybe having some neurological changes.  Patient blood pressure was 187/80, 77 heart rate and O2 sat 99%.  Patient was sent  for CT scan and after test patient was sent to ICU and  Neuro assessment was done.  According to test result and neuro  assessment everything was negative.   Patient return to room per transport.  Patient continue to have moaning sounds, call out for God and not engage with staff.

## 2023-12-08 NOTE — PLAN OF CARE
[facility]    FACILITY TRANSFER ORDERS      Patient Name: Chato Bowie  YOB: 1950    PCP: Irlanda Valenzuela -   PCP Address: [address] / [address]  PCP Phone Number: [phone]  PCP Fax: [phone]    Encounter Date: 12/08/2023    Admit to: [facility]    Vital Signs:  Routine    Diagnoses:   Active Hospital Problems    Diagnosis  POA    *Osteomyelitis of foot [M86.9]  Yes    Weakness of left leg [R29.898]  Yes    Acute delirium [R41.0]  No    Chronic diastolic heart failure [I50.32]  Yes    Atherosclerosis of native artery of extremity [I70.209]  Yes    Multiple wounds [T07.XXXA]  Yes     Chronic    Acute encephalopathy [G93.40]  Yes    UTI (urinary tract infection) [N39.0]  Yes    Paroxysmal atrial flutter [I48.92]  Yes     Chronic    BPH (benign prostatic hyperplasia) [N40.0]  Yes    Stage 3b chronic kidney disease (CKD) [N18.32]  Yes    Left knee pain [M25.562]  Yes    Alcohol abuse [F10.10]  Yes    Hypokalemia [E87.6]  Yes    Type 2 diabetes mellitus with kidney complication, with long-term current use of insulin [E11.29, Z79.4]  Not Applicable     Chronic    GERD (gastroesophageal reflux disease) [K21.9]  Yes     Chronic    Essential hypertension [I10]  Yes    Anemia [D64.9]  Yes    Dyslipidemia [E78.5]  Yes    Abdominal pain [R10.9]  Yes      Resolved Hospital Problems    Diagnosis Date Resolved POA    DJD (degenerative joint disease) [M19.90] 11/30/2023 Yes       Allergies:Review of patient's allergies indicates:  No Known Allergies    Diet: cardiac diet    Activities: Activity as tolerated    Goals of Care Treatment Preferences:  Code Status: Full Code    Health care agent: Leihg Nathan  Health care agent number: [phone]          What is most important right now is to focus on avoiding the hospital, remaining as independent as possible, symptom/pain control, extending life as long as possible, even it it means sacrificing quality.  Accordingly, we  have decided that the best plan to meet the patient's goals includes continuing with treatment.      Nursing: ad judy     Labs: CMP and CPK q  weekly      CONSULTS:    Physical Therapy to evaluate and treat. , Occupational Therapy to evaluate and treat., and  to evaluate for community resources/long-range planning.    MISCELLANEOUS :     Outpatient Antibiotic Therapy Plan: at nursing home     Please send referral to [facility].      1) Infection: osteomyelitis      2) Discharge Antibiotics:     Intravenous antibiotics:  (If CrCL <30): Daptomycin 650mg IV q 48 h   (If CrCl >30): daptomycin 650mg IV q 24h           3) Therapy Duration:  6 wks     Estimated end date of IV antibiotics: 1/10/23     4) Outpatient Weekly Labs:     Order the following labs to be drawn on Mondays:   CBC  CMP   CRP     CPK (when on Daptomycin)        5) Fax Lab Results to Infectious Diseases Provider: Susanne Dumont      [facility] Fax Number: [phone]     6) Outpatient Infectious Diseases Follow-up     Follow-up appointment will be arranged by the ID clinic and will be found in the patient's appointments tab.     Prior to discharge, please ensure the patient's follow-up has been scheduled.    If there is still no follow-up scheduled prior to discharge, please send an EPIC message to Mandi Brito in Infectious Diseases.  WOUND CARE ORDERS  None    Medications: Review discharge medications with patient and family and provide education.      Current Discharge Medication List        CONTINUE these medications which have NOT CHANGED    Details   albuterol (PROVENTIL/VENTOLIN HFA) 90 mcg/actuation inhaler Inhale 2 puffs into the lungs every 6 (six) hours as needed for Wheezing or Shortness of Breath.  Qty: 8.5 g, Refills: 0      allopurinoL (ZYLOPRIM) 100 MG tablet Take 1 tablet (100 mg total) by mouth once daily.      amiodarone (PACERONE) 200 MG Tab Take 200 mg by mouth once  daily.      ascorbic acid, vitamin C, (VITAMIN C) 500 MG tablet Take 500 mg by mouth 2 (two) times daily.      ELIQUIS 5 mg Tab Take 5 mg by mouth 2 (two) times daily.      ferrous sulfate (FEOSOL) 325 mg (65 mg iron) Tab tablet Take 325 mg by mouth once daily.      folic acid (FOLVITE) 1 MG tablet Take 1 mg by mouth once daily.      furosemide (LASIX) 80 MG tablet Take 1 tablet (80 mg total) by mouth 2 (two) times daily.  Qty: 60 tablet, Refills: 11      gabapentin (NEURONTIN) 100 MG capsule Take 1 capsule (100 mg total) by mouth 2 (two) times daily.  Qty: 60 capsule, Refills: 0      metOLazone (ZAROXOLYN) 5 MG tablet Take 1 tablet (5 mg total) by mouth once daily.      metoprolol succinate (TOPROL-XL) 25 MG 24 hr tablet Take 25 mg by mouth once daily.      multivitamin (ONE DAILY MULTIVITAMIN) per tablet Take 1 tablet by mouth once daily.      potassium chloride (MICRO-K) 10 MEQ CpSR Take 10 mEq by mouth once daily.      rosuvastatin (CRESTOR) 40 MG Tab Take 40 mg by mouth every evening.      senna-docusate 8.6-50 mg (PERICOLACE) 8.6-50 mg per tablet Take 2 tablets by mouth 2 (two) times daily as needed for Constipation.      tamsulosin (FLOMAX) 0.4 mg Cap Take 1 capsule (0.4 mg total) by mouth once daily.  Qty: 30 capsule, Refills: 11      insulin aspart U-100 (NOVOLOG) 100 unit/mL injection Inject into the skin. Per sliding scale                Immunizations Administered as of 12/8/2023       No immunizations on file.            This patient has had both covid vaccinations    Some patients may experience side effects after vaccination.  These may include fever, headache, muscle or joint aches.  Most symptoms resolve with 24-48 hours and do not require urgent medical evaluation unless they persist for more than 72 hours or symptoms are concerning for an unrelated medical condition.          _________________________________  Nydia Hernandez MD  12/08/2023

## 2023-12-08 NOTE — ASSESSMENT & PLAN NOTE
Complained of urgency and straining at micturition  UA showed cloudy urine,with 3+ leukocyte esterase,rbc 66,wbc>100 and moderate bacteria  Currently symptoms has abaited, proteus growing in urine with ceftriaxone sensitive.  -finished course during hospitalizations

## 2023-12-08 NOTE — PLAN OF CARE
No acute distress noted, patient free from falls or injury this shift.  Bed in low position, wheels locked, call light in reach for assistance, plan of care continued.      Problem: Skin Injury Risk Increased  Goal: Skin Health and Integrity  Outcome: Ongoing, Progressing     Problem: Diabetes Comorbidity  Goal: Blood Glucose Level Within Targeted Range  Outcome: Ongoing, Progressing     Problem: Impaired Wound Healing  Goal: Optimal Wound Healing  Outcome: Ongoing, Progressing

## 2023-12-08 NOTE — PROGRESS NOTES
[facility]  Vascular Surgery  Progress Note    Patient Name: Chato Bowie  MRN: [id number]  Admission Date: 11/27/2023  Primary Care Provider: Irlanda Valenzuela -    Subjective:     Interval History: No new complaints this am. Left lower extremity stable non healing wound      Post-Op Info:  * No procedures listed *   2 Days Post-Op      Medications:  Continuous Infusions:   lactated ringers 75 mL/hr at 12/08/23 0929     Scheduled Meds:   amiodarone  200 mg Oral Daily    apixaban  5 mg Oral BID    aspirin  81 mg Oral Daily    atorvastatin  40 mg Oral QHS    DAPTOmycin (CUBICIN) IV (PEDS and ADULTS)  8 mg/kg Intravenous Q48H    ferrous sulfate  1 tablet Oral BID    folic acid  1 mg Oral Daily    furosemide  40 mg Oral Daily    insulin aspart U-100  2 Units Subcutaneous TIDWM    insulin detemir U-100  8 Units Subcutaneous BID    metoprolol succinate  12.5 mg Oral Daily    polyethylene glycol  17 g Oral Daily    senna-docusate 8.6-50 mg  1 tablet Oral BID    sodium chloride 0.9%  10 mL Intravenous Q6H    tamsulosin  0.4 mg Oral Daily     PRN Meds:acetaminophen, acetaminophen, cadexomer iodine, dextrose 10%, dextrose 10%, glucagon (human recombinant), glucose, glucose, HYDROcodone-acetaminophen, HYDROmorphone, insulin aspart U-100, melatonin, naloxone, ondansetron, ondansetron, oxyCODONE-acetaminophen, potassium bicarbonate, potassium bicarbonate, simethicone, sodium chloride 0.9%, Flushing PICC/Midline Protocol **AND** sodium chloride 0.9% **AND** sodium chloride 0.9%     Objective:     Vital Signs (Most Recent):  Temp: 97.5 °F (36.4 °C) (12/08/23 1133)  Pulse: 66 (12/08/23 1133)  Resp: 18 (12/08/23 1133)  BP: (!) 146/67 (12/08/23 1133)  SpO2: 99 % (12/08/23 1133) Vital Signs (24h Range):  Temp:  [97.5 °F (36.4 °C)-98.3 °F (36.8 °C)] 97.5 °F (36.4 °C)  Pulse:  [66-73] 66  Resp:  [17-20] 18  SpO2:  [97 %-100 %] 99 %  BP: (137-157)/(62-74) 146/67         Physical Exam  Vitals and nursing note reviewed.    Constitutional:       Appearance: Normal appearance.   HENT:      Head: Normocephalic and atraumatic.   Eyes:      Extraocular Movements: Extraocular movements intact.      Pupils: Pupils are equal, round, and reactive to light.   Cardiovascular:      Rate and Rhythm: Normal rate and regular rhythm.   Pulmonary:      Effort: Pulmonary effort is normal. No respiratory distress.   Abdominal:      General: Abdomen is flat. There is no distension.   Musculoskeletal:         General: Normal range of motion.      Cervical back: Normal range of motion.      Comments: Foot wound   Neurological:      General: No focal deficit present.      Mental Status: He is alert and oriented to person, place, and time.   Psychiatric:         Mood and Affect: Mood normal.         Behavior: Behavior normal.   Significant Labs:    BMP:   Recent Labs   Lab 12/08/23  0730   GLU 75      K 3.2*      CO2 29   BUN 40*   CREATININE 2.5*   CALCIUM 9.9     CBC:   Recent Labs   Lab 12/07/23  0808   WBC 8.39   RBC 4.27*   HGB 9.8*   HCT 33.0*      MCV 77*   MCH 23.0*   MCHC 29.7*       Significant Diagnostics:  I have reviewed all pertinent imaging results/findings within the past 24 hours.    Assessment/Plan:     Active Diagnoses:    Diagnosis Date Noted POA    PRINCIPAL PROBLEM:  Osteomyelitis of foot [M86.9] 11/30/2023 Yes    Critical limb ischemia of both lower extremities [I70.223] 12/08/2023 Unknown    Weakness of left leg [R29.898] 12/07/2023 Yes    Acute delirium [R41.0] 12/06/2023 No    Chronic diastolic heart failure [I50.32] 11/28/2023 Yes    Atherosclerosis of native artery of extremity [I70.209] 11/28/2023 Yes    Multiple wounds [T07.XXXA] 07/28/2023 Yes     Chronic    Acute encephalopathy [G93.40] 07/17/2023 Yes    UTI (urinary tract infection) [N39.0] 09/05/2021 Yes    Paroxysmal atrial flutter [I48.92] 12/07/2020 Yes     Chronic    BPH (benign prostatic hyperplasia) [N40.0] 11/24/2020 Yes    Stage 3b chronic kidney  disease (CKD) [N18.32] 11/24/2020 Yes    Left knee pain [M25.562] 03/23/2020 Yes    Alcohol abuse [F10.10] 02/14/2019 Yes    Hypokalemia [E87.6] 02/14/2019 Yes    Type 2 diabetes mellitus with kidney complication, with long-term current use of insulin [E11.29, Z79.4] 03/10/2017 Not Applicable     Chronic    GERD (gastroesophageal reflux disease) [K21.9] 03/10/2017 Yes     Chronic    Essential hypertension [I10] 03/10/2017 Yes    Anemia [D64.9] 03/10/2017 Yes    Dyslipidemia [E78.5] 03/10/2017 Yes    Abdominal pain [R10.9] 03/07/2016 Yes      Problems Resolved During this Admission:    Diagnosis Date Noted Date Resolved POA    DJD (degenerative joint disease) [M19.90] 06/14/2023 11/30/2023 Yes     73-year-old man with multiple comorbidities occluding CHF CKD3 and chronic limb-threatening ischemia with bilateral foot wounds nonhealing of BLE heels s/p  BLE heel I&D w podiatry. S/p LLE angiogram 12/6 with Obinna Reid, no role for revascularization and no intervention performed at that time. Recommend continue conservative management, wound care, and heel off loading. Re consult if desire for elective amputation.     ** Plan discussed with Dr. Obinna Kimbrough NP  Vascular Surgery  [facility] - Med Surg

## 2023-12-08 NOTE — ASSESSMENT & PLAN NOTE
Chronic, uncontrolled. Latest blood pressure and vitals reviewed-     Temp:  [97.5 °F (36.4 °C)-98.3 °F (36.8 °C)]   Pulse:  [66-73]   Resp:  [17-20]   BP: (137-157)/(62-74)   SpO2:  [97 %-100 %] .   Home meds for hypertension were reviewed and noted below.   Hypertension Medications               furosemide (LASIX) 80 MG tablet Take 1 tablet (80 mg total) by mouth 2 (two) times daily.    metOLazone (ZAROXOLYN) 5 MG tablet Take 1 tablet (5 mg total) by mouth once daily.    metoprolol succinate (TOPROL-XL) 25 MG 24 hr tablet Take 25 mg by mouth once daily.            While in the hospital, will manage blood pressure as follows; Continue home antihypertensive regimen    Will utilize p.r.n. blood pressure medication only if patient's blood pressure greater than 180/110 and he develops symptoms such as worsening chest pain or shortness of breath.

## 2023-12-09 NOTE — PLAN OF CARE
[facility] refaxed patient's infectious disease notes, Facility transfer orders, and discharge summary to [facility] at [phone]. [facility]N also sent the documents via [facility].

## 2023-12-12 NOTE — OP NOTE
Date:  12/06/2023     Surgeon: Osei Yeboah MD PhD     Assistant:  None     Pre-op Diagnosis: PAD, bilateral decubitus heel wounds     Post-op Diagnosis: Same     Procedures:   1. Conscious sedation  2. Ultrasound-guided access to the left common femoral artery in an antegrade fashion.   3. Percutaneous closure of the above access site using ProGlide closure device   4. Left lower extremity diagnostic angiogram             EBL: Minimal     Anesthesia: Local, RN IV sedation     Findings:   Widely patent fem-pop, two-vessel runoff via robust PT and peroneal which directly filled patient's heel wound.            Complications:  None; patient tolerated the procedure well.    Disposition: Recoery- hemodynamically stable in good condition    Attending Attestation: I was present and scrubbed for the entire procedure.  After an informed consent was obtained the patient was brought to the interventional suite and placed in the supine position. Both the patient and procedure were confirmed and identified during timeout process. The patient received perioperative antibiotics. The patient's bilateral groins were prepped and draped in usual sterile fashion. Using direct ultrasound guidance the left CFA was accessed with a 21-G needle in an antegrade fashion, Mandril wire and 4-Fr micropuncture sheath.  A sheath shot was performed to identify the femoral bifurcation.  Then under fluoroscopic guidance, an 0.035-in wire into the superficial femoral artery, left.  The micropuncture sheath was then exchanged over the wire for a short 5-Jordanian sheath.  A diagnostic angiogram was performed of the left leg runoff through the sheath.  Angiography demonstrated:    Common femoral, profunda femoral arteries: patent without significant stenosis  Superficial femoral artery: patent without significant stenosis  Popliteal artery: patent without significant stenosis  Tibials:  Widely patent posterior tibial artery and peroneal artery  which provided inline two-vessel runoff to the patient's heel and plantar arch.  Anterior tibial artery was diffusely diseased and diminutive.  Based on the images from this diagnostic angio, a decision was made not to intervene.  I removed the 5 [place]i sheath over a Glidewire.  We then deployed a ProGlide closure device without issue to achieve hemostasis at our access site. At the conclusion of the case the patient was noted to have no evidence of a groin hematoma, and  pulse exam demonstrated:.  Palpable left PT pulse unchanged from preop  All instrument and sponge counts were correct at the end of the case. The patient tolerated the procedure well and was transferred to the pacu for further recovery.     MODERATE SEDATION IN ANGIOSUITE  I was present for moderate sedation and monitored the patients cardio respiratory functions during the procedure. See nurses notes for Intra-service start and end times and for the log of the medications, doseage and route.    Time of sedation:  30 mins.

## 2023-12-14 ENCOUNTER — OFFICE VISIT (OUTPATIENT)
Dept: UROLOGY | Facility: CLINIC | Age: 73
End: 2023-12-14
Payer: MEDICARE

## 2023-12-14 DIAGNOSIS — R35.1 NOCTURIA: ICD-10-CM

## 2023-12-14 DIAGNOSIS — R31.29 MICROHEMATURIA: ICD-10-CM

## 2023-12-14 DIAGNOSIS — R33.9 URINARY RETENTION: Primary | ICD-10-CM

## 2023-12-14 DIAGNOSIS — N52.01 ERECTILE DYSFUNCTION DUE TO ARTERIAL INSUFFICIENCY: ICD-10-CM

## 2023-12-14 PROCEDURE — 1101F PR PT FALLS ASSESS DOC 0-1 FALLS W/OUT INJ PAST YR: ICD-10-PCS | Mod: CPTII,S$GLB,, | Performed by: UROLOGY

## 2023-12-14 PROCEDURE — 99999 PR PBB SHADOW E&M-EST. PATIENT-LVL III: CPT | Mod: PBBFAC,,, | Performed by: UROLOGY

## 2023-12-14 PROCEDURE — 3288F FALL RISK ASSESSMENT DOCD: CPT | Mod: CPTII,S$GLB,, | Performed by: UROLOGY

## 2023-12-14 PROCEDURE — 1159F PR MEDICATION LIST DOCUMENTED IN MEDICAL RECORD: ICD-10-PCS | Mod: CPTII,S$GLB,, | Performed by: UROLOGY

## 2023-12-14 PROCEDURE — 1160F PR REVIEW ALL MEDS BY PRESCRIBER/CLIN PHARMACIST DOCUMENTED: ICD-10-PCS | Mod: CPTII,S$GLB,, | Performed by: UROLOGY

## 2023-12-14 PROCEDURE — 1157F PR ADVANCE CARE PLAN OR EQUIV PRESENT IN MEDICAL RECORD: ICD-10-PCS | Mod: CPTII,S$GLB,, | Performed by: UROLOGY

## 2023-12-14 PROCEDURE — 99213 PR OFFICE/OUTPT VISIT, EST, LEVL III, 20-29 MIN: ICD-10-PCS | Mod: S$GLB,,, | Performed by: UROLOGY

## 2023-12-14 PROCEDURE — 1157F ADVNC CARE PLAN IN RCRD: CPT | Mod: CPTII,S$GLB,, | Performed by: UROLOGY

## 2023-12-14 PROCEDURE — 1126F PR PAIN SEVERITY QUANTIFIED, NO PAIN PRESENT: ICD-10-PCS | Mod: CPTII,S$GLB,, | Performed by: UROLOGY

## 2023-12-14 PROCEDURE — 3288F PR FALLS RISK ASSESSMENT DOCUMENTED: ICD-10-PCS | Mod: CPTII,S$GLB,, | Performed by: UROLOGY

## 2023-12-14 PROCEDURE — 3044F PR MOST RECENT HEMOGLOBIN A1C LEVEL <7.0%: ICD-10-PCS | Mod: CPTII,S$GLB,, | Performed by: UROLOGY

## 2023-12-14 PROCEDURE — 1159F MED LIST DOCD IN RCRD: CPT | Mod: CPTII,S$GLB,, | Performed by: UROLOGY

## 2023-12-14 PROCEDURE — 1111F DSCHRG MED/CURRENT MED MERGE: CPT | Mod: CPTII,S$GLB,, | Performed by: UROLOGY

## 2023-12-14 PROCEDURE — 1101F PT FALLS ASSESS-DOCD LE1/YR: CPT | Mod: CPTII,S$GLB,, | Performed by: UROLOGY

## 2023-12-14 PROCEDURE — 3044F HG A1C LEVEL LT 7.0%: CPT | Mod: CPTII,S$GLB,, | Performed by: UROLOGY

## 2023-12-14 PROCEDURE — 1126F AMNT PAIN NOTED NONE PRSNT: CPT | Mod: CPTII,S$GLB,, | Performed by: UROLOGY

## 2023-12-14 PROCEDURE — 99213 OFFICE O/P EST LOW 20 MIN: CPT | Mod: S$GLB,,, | Performed by: UROLOGY

## 2023-12-14 PROCEDURE — 99999 PR PBB SHADOW E&M-EST. PATIENT-LVL III: ICD-10-PCS | Mod: PBBFAC,,, | Performed by: UROLOGY

## 2023-12-14 PROCEDURE — 1111F PR DISCHARGE MEDS RECONCILED W/ CURRENT OUTPATIENT MED LIST: ICD-10-PCS | Mod: CPTII,S$GLB,, | Performed by: UROLOGY

## 2023-12-14 PROCEDURE — 1160F RVW MEDS BY RX/DR IN RCRD: CPT | Mod: CPTII,S$GLB,, | Performed by: UROLOGY

## 2023-12-14 NOTE — PROGRESS NOTES
Subjective:       Chato Bowie is a 73 y.o. male who is an established patient who was referred by PCP  for evaluation of ED.      Patient complains of erectile dysfunction. Onset of dysfunction was 4-5 months ago and was sudden in onset.  Patient states the nature of difficulty is both attaining and maintaining erection. Full erections occur never. Partial erections occur never. Libido is not affected. Risk factors for ED include +CAD, HTN, uncontrolled DM2, beta-blocker use. Previous treatment of ED includes Viagra 20mg - not helpful. He was not given a higher dose.     +isosorbide mononitrate listed on MAR but states he is no longer on this medication.      He also reports urinary frequency day and night. Occasional UUI. Tried prostate meds by report but stopped as they were not helpful. +Lasix. Significant glucosuria today.     Unknown if PSA checked by PCP.     PVR (bladder scan) today - 152cc    3/5/2020   Given Viagra 100mg last visit - no improvement. Feels that he has penile shortening.   Also given Flomax for LUTS - no improvement.   PVR (bladder scan) today - 188cc    9/27/2023  Last seen > 3 years ago. Seen by Dr Owens as inpatient consult this month for UR. De Leon placed. Restarted Flomax - on currently. +constipation - last BM yesterday after med given. +Klebsiella UTI. Here for voiding trial.     Void trial:  200cc instilled. No void into urinal, +wet pad. Catheter not replaced as pt refused.     10/12/2023  Reports he is voiding without issues.   PVR (bladder scan) today - 187cc (voided 350cc prior to this in urinal)    12/14/2023  Again denies voiding issues. Denies straining. No UI.   PVR (bladder scan) today - 176cc (not true PVR, last voiding several hours ago)          The following portions of the patient's history were reviewed and updated as appropriate: allergies, current medications, past family history, past medical history, past social history, past surgical history and problem  "list.    Review of Systems  Constitutional: no fever or chills  ENT: no nasal congestion or sore throat  Respiratory: no cough or shortness of breath  Cardiovascular: no chest pain or palpitations  Gastrointestinal: no nausea or vomiting, tolerating diet  Genitourinary: as per HPI  Hematologic/Lymphatic: no easy bruising or lymphadenopathy  Musculoskeletal: no arthralgias or myalgias  Skin: no rashes or lesions  Neurological: no seizures or tremors  Behavioral/Psych: no auditory or visual hallucinations       Objective:    Vitals: There were no vitals taken for this visit.    Physical Exam   General: well developed, well nourished in no acute distress, in wheelchair. Unable to ambulate.  Head: normocephalic, atraumatic  Neck: supple, trachea midline, no obvious enlargement of thyroid  HEENT: EOMI, mucus membranes moist, sclera anicteric, no hearing impairment  Lungs: symmetric expansion, non-labored breathing  Skin: dressings on foot wounds  Neuro: alert and oriented x 3, no gross deficits  Psych: normal judgment and insight, normal mood/affect and non-anxious  Genitourinary: deferred  LINCOLN:  deferred      Lab Review   Urine analysis today in clinic shows - no urine    Lab Results   Component Value Date    WBC 8.39 12/07/2023    HGB 9.8 (L) 12/07/2023    HCT 33.0 (L) 12/07/2023    HCT 35 (L) 07/17/2023    MCV 77 (L) 12/07/2023     12/07/2023     Lab Results   Component Value Date    CREATININE 2.5 (H) 12/08/2023    BUN 40 (H) 12/08/2023     No results found for: "PSA"  No results found for: "PSADIAG"    Imaging  CT 9/23         Assessment/Plan:      1. Erectile dysfunction due to arterial insufficiency    - Multiple risk factors   - Viagra 20mg given previously - too low of dose.    - Viagra 100mg. Instructed on use and SE.    - As he has failed PDE5i, we discussed other alternatives of ED treatment. We discussed KODAK with compression ring, MUSE intraurethral suppository, penile injection therapy as " non-surgical options. We also discussed possibility of IPP implantation. He understands the pros and cons of each therapy including cost and risks.   - Interested in KODAK.       2. Nocturia    - Multifactorial   - Needs much better glucose control   - Flomax - no improvement     3. Frequency of urination    - Flomax   - Glucose control   - Also on Lasix      4. Urge incontinence    - Flomax     5. Microhematuria   - Recent UTI    6. Urinary retention   - Known COLIN from years ago   - Minimal void with VT. Pt refused catheter replacement. He is in SNF and states RN there can place catheter if recurrent UR.    - May need cysto +/- UDS   - Cont Flomax   - Avoid constipation   - Voiding is acceptable. PVR elevated but stable.    - Recheck PVR 6m      Follow up in 6 months for PVR

## 2023-12-25 PROBLEM — N18.9 ACUTE KIDNEY INJURY SUPERIMPOSED ON CKD: Status: RESOLVED | Noted: 2017-03-10 | Resolved: 2023-12-25

## 2023-12-25 PROBLEM — N17.9 ACUTE KIDNEY INJURY SUPERIMPOSED ON CKD: Status: RESOLVED | Noted: 2017-03-10 | Resolved: 2023-12-25

## 2023-12-27 LAB
FUNGUS SPEC CULT: NORMAL

## 2023-12-28 ENCOUNTER — HOSPITAL ENCOUNTER (INPATIENT)
Facility: HOSPITAL | Age: 73
LOS: 22 days | Discharge: HOSPICE | DRG: 228 | End: 2024-01-19
Attending: EMERGENCY MEDICINE | Admitting: STUDENT IN AN ORGANIZED HEALTH CARE EDUCATION/TRAINING PROGRAM
Payer: MEDICARE

## 2023-12-28 DIAGNOSIS — N19 UREMIA: ICD-10-CM

## 2023-12-28 DIAGNOSIS — N18.4 ANEMIA DUE TO STAGE 4 CHRONIC KIDNEY DISEASE: ICD-10-CM

## 2023-12-28 DIAGNOSIS — I48.91 A-FIB: ICD-10-CM

## 2023-12-28 DIAGNOSIS — N18.9 ACUTE RENAL FAILURE SUPERIMPOSED ON CHRONIC KIDNEY DISEASE, UNSPECIFIED ACUTE RENAL FAILURE TYPE, UNSPECIFIED CKD STAGE: Primary | ICD-10-CM

## 2023-12-28 DIAGNOSIS — N18.9 ACUTE KIDNEY INJURY SUPERIMPOSED ON CKD: ICD-10-CM

## 2023-12-28 DIAGNOSIS — D63.1 ANEMIA DUE TO STAGE 4 CHRONIC KIDNEY DISEASE: ICD-10-CM

## 2023-12-28 DIAGNOSIS — N17.9 ACUTE RENAL FAILURE SUPERIMPOSED ON CHRONIC KIDNEY DISEASE, UNSPECIFIED ACUTE RENAL FAILURE TYPE, UNSPECIFIED CKD STAGE: Primary | ICD-10-CM

## 2023-12-28 DIAGNOSIS — N17.9 ACUTE KIDNEY INJURY SUPERIMPOSED ON CKD: ICD-10-CM

## 2023-12-28 DIAGNOSIS — D64.9 ANEMIA, UNSPECIFIED TYPE: ICD-10-CM

## 2023-12-28 DIAGNOSIS — R00.1 BRADYCARDIA: ICD-10-CM

## 2023-12-28 DIAGNOSIS — R33.8 ACUTE URINARY RETENTION: ICD-10-CM

## 2023-12-28 DIAGNOSIS — I49.9 ARRHYTHMIA: ICD-10-CM

## 2023-12-28 DIAGNOSIS — I47.20 V TACH: ICD-10-CM

## 2023-12-28 DIAGNOSIS — R53.1 WEAKNESS: ICD-10-CM

## 2023-12-28 DIAGNOSIS — L24.A2 IRRITANT CONTACT DERMATITIS DUE TO FECAL, URINARY OR DUAL INCONTINENCE: ICD-10-CM

## 2023-12-28 DIAGNOSIS — I44.2 COMPLETE HEART BLOCK: ICD-10-CM

## 2023-12-28 DIAGNOSIS — I44.2 CHB (COMPLETE HEART BLOCK): ICD-10-CM

## 2023-12-28 DIAGNOSIS — N39.0 URINARY TRACT INFECTION WITHOUT HEMATURIA, SITE UNSPECIFIED: ICD-10-CM

## 2023-12-28 DIAGNOSIS — R07.9 CHEST PAIN: ICD-10-CM

## 2023-12-28 DIAGNOSIS — R94.31 QT PROLONGATION: ICD-10-CM

## 2023-12-28 DIAGNOSIS — Z91.89 AT RISK FOR PROLONGED QT INTERVAL SYNDROME: ICD-10-CM

## 2023-12-28 DIAGNOSIS — I50.9 CHF (CONGESTIVE HEART FAILURE): ICD-10-CM

## 2023-12-28 LAB
ALBUMIN SERPL BCP-MCNC: 1.9 G/DL (ref 3.5–5.2)
ALP SERPL-CCNC: 91 U/L (ref 55–135)
ALT SERPL W/O P-5'-P-CCNC: 102 U/L (ref 10–44)
ANION GAP SERPL CALC-SCNC: 22 MMOL/L (ref 8–16)
AST SERPL-CCNC: 74 U/L (ref 10–40)
BACTERIA #/AREA URNS AUTO: ABNORMAL /HPF
BASOPHILS # BLD AUTO: 0.04 K/UL (ref 0–0.2)
BASOPHILS NFR BLD: 0.4 % (ref 0–1.9)
BILIRUB SERPL-MCNC: 0.6 MG/DL (ref 0.1–1)
BILIRUB UR QL STRIP: NEGATIVE
BUN SERPL-MCNC: 176 MG/DL (ref 8–23)
CALCIUM SERPL-MCNC: 8.5 MG/DL (ref 8.7–10.5)
CHLORIDE SERPL-SCNC: 87 MMOL/L (ref 95–110)
CLARITY UR REFRACT.AUTO: ABNORMAL
CO2 SERPL-SCNC: 23 MMOL/L (ref 23–29)
COLOR UR AUTO: ABNORMAL
CREAT SERPL-MCNC: 7.5 MG/DL (ref 0.5–1.4)
DIFFERENTIAL METHOD BLD: ABNORMAL
EOSINOPHIL # BLD AUTO: 0.1 K/UL (ref 0–0.5)
EOSINOPHIL NFR BLD: 1 % (ref 0–8)
ERYTHROCYTE [DISTWIDTH] IN BLOOD BY AUTOMATED COUNT: 17.7 % (ref 11.5–14.5)
EST. GFR  (NO RACE VARIABLE): 7.1 ML/MIN/1.73 M^2
GLUCOSE SERPL-MCNC: 125 MG/DL (ref 70–110)
GLUCOSE UR QL STRIP: NEGATIVE
HCT VFR BLD AUTO: 22.9 % (ref 40–54)
HGB BLD-MCNC: 7.2 G/DL (ref 14–18)
HGB UR QL STRIP: ABNORMAL
HYALINE CASTS UR QL AUTO: 0 /LPF
IMM GRANULOCYTES # BLD AUTO: 0.05 K/UL (ref 0–0.04)
IMM GRANULOCYTES NFR BLD AUTO: 0.5 % (ref 0–0.5)
KETONES UR QL STRIP: NEGATIVE
LEUKOCYTE ESTERASE UR QL STRIP: ABNORMAL
LIPASE SERPL-CCNC: 146 U/L (ref 4–60)
LYMPHOCYTES # BLD AUTO: 0.8 K/UL (ref 1–4.8)
LYMPHOCYTES NFR BLD: 7.9 % (ref 18–48)
MAGNESIUM SERPL-MCNC: 2 MG/DL (ref 1.6–2.6)
MCH RBC QN AUTO: 25 PG (ref 27–31)
MCHC RBC AUTO-ENTMCNC: 31.4 G/DL (ref 32–36)
MCV RBC AUTO: 80 FL (ref 82–98)
MICROSCOPIC COMMENT: ABNORMAL
MONOCYTES # BLD AUTO: 0.8 K/UL (ref 0.3–1)
MONOCYTES NFR BLD: 7.9 % (ref 4–15)
NEUTROPHILS # BLD AUTO: 8.3 K/UL (ref 1.8–7.7)
NEUTROPHILS NFR BLD: 82.3 % (ref 38–73)
NITRITE UR QL STRIP: NEGATIVE
NRBC BLD-RTO: 0 /100 WBC
PH UR STRIP: 6 [PH] (ref 5–8)
PHOSPHATE SERPL-MCNC: 8.3 MG/DL (ref 2.7–4.5)
PLATELET # BLD AUTO: 396 K/UL (ref 150–450)
PMV BLD AUTO: 9.2 FL (ref 9.2–12.9)
POTASSIUM SERPL-SCNC: 3.6 MMOL/L (ref 3.5–5.1)
PROT SERPL-MCNC: 6.6 G/DL (ref 6–8.4)
PROT UR QL STRIP: ABNORMAL
RBC # BLD AUTO: 2.88 M/UL (ref 4.6–6.2)
RBC #/AREA URNS AUTO: 18 /HPF (ref 0–4)
SARS-COV-2 RDRP RESP QL NAA+PROBE: NEGATIVE
SODIUM SERPL-SCNC: 132 MMOL/L (ref 136–145)
SP GR UR STRIP: 1.01 (ref 1–1.03)
TSH SERPL DL<=0.005 MIU/L-ACNC: 1.86 UIU/ML (ref 0.4–4)
URN SPEC COLLECT METH UR: ABNORMAL
WBC # BLD AUTO: 10.05 K/UL (ref 3.9–12.7)
WBC #/AREA URNS AUTO: >100 /HPF (ref 0–5)
WBC CLUMPS UR QL AUTO: ABNORMAL

## 2023-12-28 PROCEDURE — 99223 1ST HOSP IP/OBS HIGH 75: CPT | Mod: GC,,, | Performed by: STUDENT IN AN ORGANIZED HEALTH CARE EDUCATION/TRAINING PROGRAM

## 2023-12-28 PROCEDURE — 25000003 PHARM REV CODE 250

## 2023-12-28 PROCEDURE — 25000003 PHARM REV CODE 250: Performed by: EMERGENCY MEDICINE

## 2023-12-28 PROCEDURE — 63600175 PHARM REV CODE 636 W HCPCS: Performed by: INTERNAL MEDICINE

## 2023-12-28 PROCEDURE — 63600175 PHARM REV CODE 636 W HCPCS

## 2023-12-28 PROCEDURE — 12000002 HC ACUTE/MED SURGE SEMI-PRIVATE ROOM

## 2023-12-28 PROCEDURE — 93010 ELECTROCARDIOGRAM REPORT: CPT | Mod: ,,, | Performed by: INTERNAL MEDICINE

## 2023-12-28 PROCEDURE — 87186 SC STD MICRODIL/AGAR DIL: CPT | Performed by: EMERGENCY MEDICINE

## 2023-12-28 PROCEDURE — U0002 COVID-19 LAB TEST NON-CDC: HCPCS | Performed by: STUDENT IN AN ORGANIZED HEALTH CARE EDUCATION/TRAINING PROGRAM

## 2023-12-28 PROCEDURE — 83735 ASSAY OF MAGNESIUM: CPT | Performed by: EMERGENCY MEDICINE

## 2023-12-28 PROCEDURE — 85025 COMPLETE CBC W/AUTO DIFF WBC: CPT | Performed by: EMERGENCY MEDICINE

## 2023-12-28 PROCEDURE — 80053 COMPREHEN METABOLIC PANEL: CPT | Performed by: EMERGENCY MEDICINE

## 2023-12-28 PROCEDURE — 99285 EMERGENCY DEPT VISIT HI MDM: CPT | Mod: 25

## 2023-12-28 PROCEDURE — 81001 URINALYSIS AUTO W/SCOPE: CPT | Performed by: EMERGENCY MEDICINE

## 2023-12-28 PROCEDURE — 80047 BASIC METABLC PNL IONIZED CA: CPT

## 2023-12-28 PROCEDURE — 63600175 PHARM REV CODE 636 W HCPCS: Performed by: EMERGENCY MEDICINE

## 2023-12-28 PROCEDURE — 84100 ASSAY OF PHOSPHORUS: CPT | Performed by: EMERGENCY MEDICINE

## 2023-12-28 PROCEDURE — 25000003 PHARM REV CODE 250: Performed by: INTERNAL MEDICINE

## 2023-12-28 PROCEDURE — 82550 ASSAY OF CK (CPK): CPT | Performed by: INTERNAL MEDICINE

## 2023-12-28 PROCEDURE — 87088 URINE BACTERIA CULTURE: CPT | Performed by: EMERGENCY MEDICINE

## 2023-12-28 PROCEDURE — 87086 URINE CULTURE/COLONY COUNT: CPT | Performed by: EMERGENCY MEDICINE

## 2023-12-28 PROCEDURE — 93005 ELECTROCARDIOGRAM TRACING: CPT

## 2023-12-28 PROCEDURE — 83690 ASSAY OF LIPASE: CPT | Performed by: EMERGENCY MEDICINE

## 2023-12-28 PROCEDURE — 87077 CULTURE AEROBIC IDENTIFY: CPT | Performed by: EMERGENCY MEDICINE

## 2023-12-28 PROCEDURE — 84443 ASSAY THYROID STIM HORMONE: CPT | Performed by: EMERGENCY MEDICINE

## 2023-12-28 RX ORDER — SODIUM,POTASSIUM PHOSPHATES 280-250MG
2 POWDER IN PACKET (EA) ORAL
Status: DISCONTINUED | OUTPATIENT
Start: 2023-12-28 | End: 2023-12-31

## 2023-12-28 RX ORDER — AMOXICILLIN 250 MG
2 CAPSULE ORAL 2 TIMES DAILY PRN
Status: DISCONTINUED | OUTPATIENT
Start: 2023-12-28 | End: 2024-01-18

## 2023-12-28 RX ORDER — METOPROLOL SUCCINATE 25 MG/1
25 TABLET, EXTENDED RELEASE ORAL DAILY
Status: DISCONTINUED | OUTPATIENT
Start: 2023-12-29 | End: 2023-12-28

## 2023-12-28 RX ORDER — ONDANSETRON 8 MG/1
8 TABLET, ORALLY DISINTEGRATING ORAL EVERY 8 HOURS PRN
Status: DISCONTINUED | OUTPATIENT
Start: 2023-12-28 | End: 2024-01-01

## 2023-12-28 RX ORDER — SODIUM CHLORIDE 0.9 % (FLUSH) 0.9 %
10 SYRINGE (ML) INJECTION EVERY 12 HOURS PRN
Status: DISCONTINUED | OUTPATIENT
Start: 2023-12-28 | End: 2024-01-19 | Stop reason: HOSPADM

## 2023-12-28 RX ORDER — FAMOTIDINE 20 MG/1
20 TABLET, FILM COATED ORAL 2 TIMES DAILY
Status: DISCONTINUED | OUTPATIENT
Start: 2023-12-28 | End: 2023-12-29

## 2023-12-28 RX ORDER — METOLAZONE 5 MG/1
5 TABLET ORAL DAILY
Status: DISCONTINUED | OUTPATIENT
Start: 2023-12-29 | End: 2023-12-28

## 2023-12-28 RX ORDER — INSULIN ASPART 100 [IU]/ML
0-5 INJECTION, SOLUTION INTRAVENOUS; SUBCUTANEOUS
Status: DISCONTINUED | OUTPATIENT
Start: 2023-12-28 | End: 2024-01-19 | Stop reason: HOSPADM

## 2023-12-28 RX ORDER — ACETAMINOPHEN 325 MG/1
650 TABLET ORAL EVERY 4 HOURS PRN
Status: DISCONTINUED | OUTPATIENT
Start: 2023-12-28 | End: 2023-12-30

## 2023-12-28 RX ORDER — ATORVASTATIN CALCIUM 40 MG/1
80 TABLET, FILM COATED ORAL DAILY
Status: DISCONTINUED | OUTPATIENT
Start: 2023-12-29 | End: 2024-01-19 | Stop reason: HOSPADM

## 2023-12-28 RX ORDER — FOLIC ACID 1 MG/1
1 TABLET ORAL DAILY
Status: DISCONTINUED | OUTPATIENT
Start: 2023-12-29 | End: 2024-01-19 | Stop reason: HOSPADM

## 2023-12-28 RX ORDER — NALOXONE HCL 0.4 MG/ML
0.02 VIAL (ML) INJECTION
Status: DISCONTINUED | OUTPATIENT
Start: 2023-12-28 | End: 2024-01-19 | Stop reason: HOSPADM

## 2023-12-28 RX ORDER — GLUCAGON 1 MG
1 KIT INJECTION
Status: DISCONTINUED | OUTPATIENT
Start: 2023-12-28 | End: 2024-01-19 | Stop reason: HOSPADM

## 2023-12-28 RX ORDER — ALLOPURINOL 100 MG/1
100 TABLET ORAL DAILY
Status: DISCONTINUED | OUTPATIENT
Start: 2023-12-29 | End: 2023-12-29

## 2023-12-28 RX ORDER — IBUPROFEN 200 MG
16 TABLET ORAL
Status: DISCONTINUED | OUTPATIENT
Start: 2023-12-28 | End: 2024-01-19 | Stop reason: HOSPADM

## 2023-12-28 RX ORDER — IBUPROFEN 200 MG
24 TABLET ORAL
Status: DISCONTINUED | OUTPATIENT
Start: 2023-12-28 | End: 2024-01-19 | Stop reason: HOSPADM

## 2023-12-28 RX ORDER — LANOLIN ALCOHOL/MO/W.PET/CERES
800 CREAM (GRAM) TOPICAL
Status: DISCONTINUED | OUTPATIENT
Start: 2023-12-28 | End: 2024-01-19 | Stop reason: HOSPADM

## 2023-12-28 RX ADMIN — DAPTOMYCIN 650 MG: 350 INJECTION, POWDER, LYOPHILIZED, FOR SOLUTION INTRAVENOUS at 11:12

## 2023-12-28 RX ADMIN — CEFTRIAXONE SODIUM 1 G: 1 INJECTION, POWDER, FOR SOLUTION INTRAMUSCULAR; INTRAVENOUS at 05:12

## 2023-12-28 RX ADMIN — FAMOTIDINE 20 MG: 20 TABLET, FILM COATED ORAL at 11:12

## 2023-12-28 RX ADMIN — SODIUM CHLORIDE 1000 ML: 9 INJECTION, SOLUTION INTRAVENOUS at 05:12

## 2023-12-28 RX ADMIN — CEFEPIME 2 G: 2 INJECTION, POWDER, FOR SOLUTION INTRAVENOUS at 07:12

## 2023-12-28 RX ADMIN — SODIUM CHLORIDE 1000 ML: 9 INJECTION, SOLUTION INTRAVENOUS at 03:12

## 2023-12-28 NOTE — ED PROVIDER NOTES
Chief Complaint   Multiple complaints  (Arrives via EMS with c/o elevated kidney fx, fatigue, and bradycardia -30s coming from [place]  )      History Of Present Illness   Chato Bowie is a 73 y.o. male presenting with right lower quadrant abdominal pain, fatigue, slow heart rate and weakness.  Labs at his nursing home apparently showed BUN of 173 and creatinine of 7.1.  He was sent to the ED for further evaluation.  He feels like he has not had enough to eat lately.  Denies nausea.      Review of patient's allergies indicates:  No Known Allergies    No current facility-administered medications on file prior to encounter.     Current Outpatient Medications on File Prior to Encounter   Medication Sig Dispense Refill    albuterol (PROVENTIL/VENTOLIN HFA) 90 mcg/actuation inhaler Inhale 2 puffs into the lungs every 6 (six) hours as needed for Wheezing or Shortness of Breath. 8.5 g 0    allopurinoL (ZYLOPRIM) 100 MG tablet Take 1 tablet (100 mg total) by mouth once daily.      amiodarone (PACERONE) 200 MG Tab Take 200 mg by mouth once daily.      ascorbic acid, vitamin C, (VITAMIN C) 500 MG tablet Take 500 mg by mouth 2 (two) times daily.      ELIQUIS 5 mg Tab Take 5 mg by mouth 2 (two) times daily.      ferrous sulfate (FEOSOL) 325 mg (65 mg iron) Tab tablet Take 325 mg by mouth once daily.      folic acid (FOLVITE) 1 MG tablet Take 1 mg by mouth once daily.      furosemide (LASIX) 80 MG tablet Take 1 tablet (80 mg total) by mouth 2 (two) times daily. 60 tablet 11    gabapentin (NEURONTIN) 100 MG capsule Take 1 capsule (100 mg total) by mouth 2 (two) times daily. 60 capsule 0    insulin aspart U-100 (NOVOLOG) 100 unit/mL injection Inject into the skin. Per sliding scale      metOLazone (ZAROXOLYN) 5 MG tablet Take 1 tablet (5 mg total) by mouth once daily.      metoprolol succinate (TOPROL-XL) 25 MG 24 hr tablet Take 25 mg by mouth once daily.      multivitamin (ONE DAILY MULTIVITAMIN) per tablet Take 1 tablet by  mouth once daily.      potassium chloride (MICRO-K) 10 MEQ CpSR Take 10 mEq by mouth once daily.      rosuvastatin (CRESTOR) 40 MG Tab Take 40 mg by mouth every evening.      senna-docusate 8.6-50 mg (PERICOLACE) 8.6-50 mg per tablet Take 2 tablets by mouth 2 (two) times daily as needed for Constipation.      tamsulosin (FLOMAX) 0.4 mg Cap Take 1 capsule (0.4 mg total) by mouth once daily. 30 capsule 11       Past History   As per HPI and below:  Past Medical History:   Diagnosis Date    Acute congestive heart failure 3/20/2020    Alcohol abuse     Arthritis     Asthma attack 11/24/2021    Coronary artery disease     Diabetes mellitus     Hyperlipemia     Hypertension     Multiple thyroid nodules 6/14/2023    Rhabdomyolysis      Past Surgical History:   Procedure Laterality Date    ECHOCARDIOGRAM,TRANSESOPHAGEAL N/A 9/21/2023    Procedure: Transesophageal echo (MARIA) intra-procedure log documentation;  Surgeon: Luisana Rodríguez MD;  Location: [facility] CATH LAB;  Service: Cardiology;  Laterality: N/A;    EYE SURGERY      IRRIGATION AND DEBRIDEMENT Bilateral 12/1/2023    Procedure: IRRIGATION AND DEBRIDEMENT;  Surgeon: Jenna Gutiérrez DPM;  Location: [facility] OR;  Service: Podiatry;  Laterality: Bilateral;  Request antibiotic beads    TRANSESOPHAGEAL ECHOCARDIOGRAM WITH POSSIBLE CARDIOVERSION (MARIA W/ POSS CARDIOVERSION) N/A 1/5/2023    Procedure: Transesophageal echo (MARIA) intra-procedure log documentation;  Surgeon: Indra Foote MD;  Location: [facility] CATH LAB;  Service: Cardiology;  Laterality: N/A;    TRANSESOPHAGEAL ECHOCARDIOGRAPHY N/A 9/21/2023    Procedure: ECHOCARDIOGRAM, TRANSESOPHAGEAL;  Surgeon: Luisana Rodríguez MD;  Location: [facility] CATH LAB;  Service: Cardiology;  Laterality: N/A;    TREATMENT OF CARDIAC ARRHYTHMIA N/A 12/7/2020    Procedure: Cardioversion or Defibrillation;  Surgeon: Indra Foote MD;  Location: [facility] CATH LAB;  Service: Cardiology;  Laterality: N/A;    TREATMENT OF CARDIAC ARRHYTHMIA  N/A 12/30/2020    Procedure: Cardioversion or Defibrillation;  Surgeon: Tyrone Steen MD;  Location: [facility] CATH LAB;  Service: Cardiology;  Laterality: N/A;    TREATMENT OF CARDIAC ARRHYTHMIA N/A 1/5/2023    Procedure: Cardioversion or Defibrillation;  Surgeon: Indra Foote MD;  Location: [facility] CATH LAB;  Service: Cardiology;  Laterality: N/A;    VASCULAR SURGERY         Social History     Tobacco Use    Smoking status: Never    Smokeless tobacco: Never   Substance Use Topics    Alcohol use: Not Currently     Alcohol/week: 6.0 standard drinks of alcohol     Types: 6 Cans of beer per week    Drug use: No       Family History   Problem Relation Age of Onset    Hypertension Mother     Diabetes Mother     Diabetes Father     Hypertension Father     Diabetes Sister     Hypertension Sister        Physical Exam     Vitals:    12/28/23 1515 12/28/23 1520 12/28/23 1631 12/28/23 1632   BP: (!) 108/54   (!) 111/51   Pulse: (!) 45 (!) 45  (!) 46   Resp: (!) 24 (!) 23 (!) 21    Temp:       TempSrc:       SpO2: (!) 91% 100%  100%     Appearance: No acute distress.  Skin: No rashes seen.  Good turgor.  No abrasions.  No ecchymoses.  Eyes: No conjunctival injection.  ENT: Oropharynx clear.    Chest: Clear to auscultation bilaterally.  Good air movement.  No wheezes.  No rhonchi.  Cardiovascular: Bradycardic.  No murmurs. No gallops. No rubs.  Abdomen: Soft.  Not distended.  RLQ TTP.  No guarding.  No rebound.  Musculoskeletal: Good range of motion all joints.  No deformities.  Neck supple.  No meningismus.  Neurologic: Motor intact.  Sensation intact.  Cerebellar intact.  Cranial nerves intact.  Mental Status:  Alert and oriented x 3.  Appropriate, conversant.      Initial MDM   Weakness, reported markedly abnormal kidney function.  Will recheck labs, give fluids and plan admission.  Heart rate currently 40s, pressure is okay, will monitor.  No need for pacing at this time.        Medications Given     Medications    cefTRIAXone (ROCEPHIN) 1 g in dextrose 5 % in water (D5W) 100 mL IVPB (MB+) (1 g Intravenous New Bag 12/28/23 1709)   sodium chloride 0.9% bolus 1,000 mL 1,000 mL (has no administration in time range)   sodium chloride 0.9% bolus 1,000 mL 1,000 mL (1,000 mLs Intravenous New Bag 12/28/23 1543)       Results and Course     Labs Reviewed   CBC W/ AUTO DIFFERENTIAL - Abnormal; Notable for the following components:       Result Value    RBC 2.88 (*)     Hemoglobin 7.2 (*)     Hematocrit 22.9 (*)     MCV 80 (*)     MCH 25.0 (*)     MCHC 31.4 (*)     RDW 17.7 (*)     Gran # (ANC) 8.3 (*)     Immature Grans (Abs) 0.05 (*)     Lymph # 0.8 (*)     Gran % 82.3 (*)     Lymph % 7.9 (*)     All other components within normal limits    Narrative:     add on lipas per  /order#[id number] @ 12/28/2023  15:38    COMPREHENSIVE METABOLIC PANEL - Abnormal; Notable for the following components:    Sodium 132 (*)     Chloride 87 (*)     Glucose 125 (*)      (*)     Creatinine 7.5 (*)     Calcium 8.5 (*)     Albumin 1.9 (*)     AST 74 (*)      (*)     eGFR 7.1 (*)     Anion Gap 22 (*)     All other components within normal limits    Narrative:     add on lipas per  /order#[id number] @ 12/28/2023  15:38    PHOSPHORUS - Abnormal; Notable for the following components:    Phosphorus 8.3 (*)     All other components within normal limits    Narrative:     add on lipas per  /order#[id number] @ 12/28/2023  15:38    URINALYSIS, REFLEX TO URINE CULTURE - Abnormal; Notable for the following components:    Color, UA Orange (*)     Protein, UA 2+ (*)     Occult Blood UA 2+ (*)     Leukocytes, UA 3+ (*)     All other components within normal limits    Narrative:     Specimen Source->Urine   LIPASE - Abnormal; Notable for the following components:    Lipase 146 (*)     All other components within normal limits    Narrative:     add on lipas per  /order#[id number] @ 12/28/2023  15:38    URINALYSIS  MICROSCOPIC - Abnormal; Notable for the following components:    RBC, UA 18 (*)     WBC, UA >100 (*)     WBC Clumps, UA Few (*)     Bacteria Many (*)     All other components within normal limits    Narrative:     Specimen Source->Urine   CULTURE, URINE   TSH    Narrative:     add on lipas per  /order#[id number] @ 12/28/2023  15:38    MAGNESIUM    Narrative:     add on lipas per  /order#[id number] @ 12/28/2023  15:38    LIPASE   ISTAT CHEM8       Imaging Results              X-Ray Chest AP Portable (Final result)  Result time 12/28/23 16:20:10      Final result by Isaias Lechuga MD (12/28/23 16:20:10)                   Impression:      Interval mild retraction of the right-sided PICC line with the tip now within the mid SVC.    Otherwise, stable examination.      Electronically signed by: Isaias Lechuga MD  Date:    12/28/2023  Time:    16:20               Narrative:    EXAMINATION:  XR CHEST AP PORTABLE    CLINICAL HISTORY:  Weakness    TECHNIQUE:  Single frontal view of the chest was performed.    COMPARISON:  12/07/2023    FINDINGS:  The right-sided PICC line has been retracted with the tip within the mid SVC.  Monitoring EKG leads are present.    The trachea is unremarkable.  The cardiomediastinal silhouette is stable.  There is no evidence of free air beneath the hemidiaphragms.  There are no pleural effusions.  There is no evidence of pneumothorax.  There is no evidence of pneumomediastinum.  There are stable bilateral interstitial opacities.  There is no focal consolidation.  There are degenerative changes in the osseous structures.                                      ED Course as of 12/28/23 1709   Thu Dec 28, 2023   1539 EKG 12-lead  Bradycardia @ 45 bpm, similar wide complex QRS as previous EKG per my independent interpretation.     [DC]   1551 Bladder scan 471 per RN, will place jackson [DC]   1620 WBC: 10.05 [DC]   1620 Hemoglobin(!): 7.2 [DC]   1620 Platelet Count: 396 [DC]   1639 WBC,  UA(!): >100 [DC]   1639 WBC Clumps, UA(!): Few [DC]   1639 Bacteria, UA(!): Many [DC]   1652 Lipase(!): 146 [DC]   1652 Phosphorus Level(!): 8.3 [DC]   1652 Potassium: 3.6 [DC]   1652 Creatinine(!): 7.5 [DC]   1652 Magnesium : 2.0 [DC]   1652 TSH: 1.857 [DC]   1707 Discussed with nephrology, who recommend floor admission and aggressive fluid hydration [DC]      ED Course User Index  [DC] Jose L Foley MD         Critical Care   Date: 12/28/2023  Performed by: Jose L Foley MD   Authorized by: Jose L Foley MD    Total critical care time (exclusive of procedural time) : 85 minutes  Critical care was necessary to treat or prevent imminent or life-threatening deterioration of the following conditions:  ARF on CKD, severe bradycardia        MDM, Impression and Plan   73 y.o. male with uremia and acute renal failure, likely a combination of dehydration and obstruction from urinary retention.  De Leon placed with good drainage.  Antibiotics given.  Will give additional fluids per nephrology recommendations.  Discussed with hospital medicine for admission.         Final diagnoses:  [R00.1] Bradycardia  [R53.1] Weakness  [N17.9, N18.9] Acute renal failure superimposed on chronic kidney disease, unspecified acute renal failure type, unspecified CKD stage (Primary)  [N19] Uremia  [R33.8] Acute urinary retention  [N39.0] Urinary tract infection without hematuria, site unspecified        ED Disposition Condition    Admit                   Jose L Foley MD  12/28/23 9401

## 2023-12-28 NOTE — ED NOTES
I-STAT Chem-8+ Results:   Value Reference Range   Sodium 129 136-145 mmol/L   Potassium  3.3 3.5-5.1 mmol/L   Chloride 85  mmol/L   Ionized Calcium 0.83 1.06-1.42 mmol/L   CO2 (measured) 24 23-29 mmol/L   Glucose 129  mg/dL   BUN > 140 6-30 mg/dL   Creatinine 8.9 0.5-1.4 mg/dL   Hematocrit 21 36-54%

## 2023-12-28 NOTE — Clinical Note
was deployed to the right atrium. Placement confirmed by tug test under flouro and thresholds performed. After confirmation, tether was cut and removed.

## 2023-12-28 NOTE — HPI
73yoM w/PMHx of T2DM, HTN, HLD, CHF, CAD, HLD, CKD 3b, alcohol use disorder, and multiple thyroid nodules presented from [facility] with RLQ pain, fatigue, bradycardia ~30's, weakness, and lab abnormalities including Cr 7.1 and . 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  On interview patient lethargic and disoriented, history obtained via chart review.    In ED pt AF, bradycardic ~40's, RR in the mid 20's, BP ~110/50, satting 100% on 2L NC. Labs significant for Hb 7.2, ANC 8.3, Na 132, Cl 87, HCO3 23, AG 22, , Cr 7.5, phos 8.3, Albumin 1.9, AST 74, , Lipase 146. UA with 2+ protein, 2+ occult blood, 3+ leukocytes, >100WBC and many bacteria. EKG showed wide QRS with LBBB, vent rate 45, QT/QTc 652/563. CXR showed mild retraction of R-sided PICC into mid SVC. CT A&P in progress. In ED jackson placed, started on CTX, given fluids, nephrology consulted. Admitting to [facility] for further management.

## 2023-12-28 NOTE — ED NOTES
Nurses Note -- 4 Eyes      12/28/2023   4:22 PM      Skin assessed during: Q Shift Change      [] No Altered Skin Integrity Present    []Prevention Measures Documented      [x] Yes- Altered Skin Integrity Present or Discovered   [x] LDA Added if Not in Epic (Describe Wound)   [x] New Altered Skin Integrity was Present on Admit and Documented in LDA   [x] Wound Image Taken    Wound Care Consulted? No    Attending Nurse:  Miles Marshall RN/Staff Member: Mandi

## 2023-12-28 NOTE — ED NOTES
Patient identifiers for Chato Bowie 73 y.o. male checked and correct.  Chief Complaint   Patient presents with    Multiple complaints      Arrives via EMS with c/o elevated kidney fx, fatigue, and bradycardia -30s coming from [place]       Past Medical History:   Diagnosis Date    Acute congestive heart failure 3/20/2020    Alcohol abuse     Arthritis     Asthma attack 11/24/2021    Coronary artery disease     Diabetes mellitus     Hyperlipemia     Hypertension     Multiple thyroid nodules 6/14/2023    Rhabdomyolysis      Allergies reported: Review of patient's allergies indicates:  No Known Allergies      LOC: Patient is awake, alert, and aware of environment with an appropriate affect. Patient is oriented x 1 and speaking appropriately.  APPEARANCE: Patient resting comfortably and in no acute distress. Patient is clean and well groomed, patient's clothing is properly fastened.  SKIN: The skin is warm and dry. Patient has normal skin turgor and moist mucus membranes. Skin breakdown noted to area above sacral area   MUSKULOSKELETAL: , no obvious deformities noted. Pulses intact. Generalized weakness  RESPIRATORY: Airway is open and patent. Respirations are spontaneous and non-labored. Pt on 2L NC  CARDIAC: Patient has a normal rate and rhythm. bradycardia on cardiac monitor. No peripheral edema noted.   ABDOMEN: No distention noted. Soft and non-tender upon palpation.  NEUROLOGICAL:  PERRL. Facial expression is symmetrical. Hand grasps are equal bilaterally. Normal sensation in all extremities when touched with finger.

## 2023-12-29 PROBLEM — I44.2 COMPLETE HEART BLOCK: Status: ACTIVE | Noted: 2023-12-29

## 2023-12-29 LAB
ABO + RH BLD: NORMAL
ALBUMIN SERPL BCP-MCNC: 2 G/DL (ref 3.5–5.2)
ALP SERPL-CCNC: 88 U/L (ref 55–135)
ALT SERPL W/O P-5'-P-CCNC: 91 U/L (ref 10–44)
ANION GAP SERPL CALC-SCNC: 19 MMOL/L (ref 8–16)
ASCENDING AORTA: 3.56 CM
AST SERPL-CCNC: 63 U/L (ref 10–40)
AV INDEX (PROSTH): 0.22
AV MEAN GRADIENT: 43 MMHG
AV PEAK GRADIENT: 68 MMHG
AV VALVE AREA BY VELOCITY RATIO: 0.78 CM²
AV VALVE AREA: 0.86 CM²
AV VELOCITY RATIO: 0.2
BASOPHILS # BLD AUTO: 0.03 K/UL (ref 0–0.2)
BASOPHILS NFR BLD: 0.3 % (ref 0–1.9)
BILIRUB SERPL-MCNC: 0.5 MG/DL (ref 0.1–1)
BLD GP AB SCN CELLS X3 SERPL QL: NORMAL
BLD PROD TYP BPU: NORMAL
BLOOD UNIT EXPIRATION DATE: NORMAL
BLOOD UNIT TYPE CODE: 7300
BLOOD UNIT TYPE: NORMAL
BSA FOR ECHO PROCEDURE: 2.01 M2
BUN SERPL-MCNC: 169 MG/DL (ref 8–23)
BUN SERPL-MCNC: >140 MG/DL (ref 6–30)
CALCIUM SERPL-MCNC: 8.3 MG/DL (ref 8.7–10.5)
CHLORIDE SERPL-SCNC: 85 MMOL/L (ref 95–110)
CHLORIDE SERPL-SCNC: 90 MMOL/L (ref 95–110)
CK SERPL-CCNC: 215 U/L (ref 20–200)
CO2 SERPL-SCNC: 24 MMOL/L (ref 23–29)
CODING SYSTEM: NORMAL
CREAT SERPL-MCNC: 7.7 MG/DL (ref 0.5–1.4)
CREAT SERPL-MCNC: 8.9 MG/DL (ref 0.5–1.4)
CROSSMATCH INTERPRETATION: NORMAL
CV ECHO LV RWT: 0.42 CM
DIFFERENTIAL METHOD BLD: ABNORMAL
DISPENSE STATUS: NORMAL
DOP CALC AO PEAK VEL: 4.12 M/S
DOP CALC AO VTI: 100.91 CM
DOP CALC LVOT AREA: 3.9 CM2
DOP CALC LVOT DIAMETER: 2.23 CM
DOP CALC LVOT PEAK VEL: 0.82 M/S
DOP CALC LVOT STROKE VOLUME: 86.74 CM3
DOP CALCLVOT PEAK VEL VTI: 22.22 CM
E WAVE DECELERATION TIME: 232.76 MSEC
E/A RATIO: 1.8
E/E' RATIO: 15.5 M/S
ECHO LV POSTERIOR WALL: 1.07 CM (ref 0.6–1.1)
EOSINOPHIL # BLD AUTO: 0.2 K/UL (ref 0–0.5)
EOSINOPHIL NFR BLD: 1.5 % (ref 0–8)
ERYTHROCYTE [DISTWIDTH] IN BLOOD BY AUTOMATED COUNT: 17.8 % (ref 11.5–14.5)
EST. GFR  (NO RACE VARIABLE): 6.9 ML/MIN/1.73 M^2
FRACTIONAL SHORTENING: 33 % (ref 28–44)
GLUCOSE SERPL-MCNC: 109 MG/DL (ref 70–110)
GLUCOSE SERPL-MCNC: 129 MG/DL (ref 70–110)
HCT VFR BLD AUTO: 20.7 % (ref 40–54)
HCT VFR BLD CALC: 21 %PCV (ref 36–54)
HGB BLD-MCNC: 6.4 G/DL (ref 14–18)
IMM GRANULOCYTES # BLD AUTO: 0.07 K/UL (ref 0–0.04)
IMM GRANULOCYTES NFR BLD AUTO: 0.7 % (ref 0–0.5)
INTERVENTRICULAR SEPTUM: 1.09 CM (ref 0.6–1.1)
LA MAJOR: 6.81 CM
LA MINOR: 6.81 CM
LA WIDTH: 6.05 CM
LEFT ATRIUM SIZE: 5.89 CM
LEFT ATRIUM VOLUME INDEX MOD: 76.3 ML/M2
LEFT ATRIUM VOLUME INDEX: 102.6 ML/M2
LEFT ATRIUM VOLUME MOD: 153.36 CM3
LEFT ATRIUM VOLUME: 206.27 CM3
LEFT INTERNAL DIMENSION IN SYSTOLE: 3.4 CM (ref 2.1–4)
LEFT VENTRICLE DIASTOLIC VOLUME INDEX: 61.03 ML/M2
LEFT VENTRICLE DIASTOLIC VOLUME: 122.68 ML
LEFT VENTRICLE MASS INDEX: 103 G/M2
LEFT VENTRICLE SYSTOLIC VOLUME INDEX: 23.6 ML/M2
LEFT VENTRICLE SYSTOLIC VOLUME: 47.4 ML
LEFT VENTRICULAR INTERNAL DIMENSION IN DIASTOLE: 5.08 CM (ref 3.5–6)
LEFT VENTRICULAR MASS: 207.27 G
LV LATERAL E/E' RATIO: 15.5 M/S
LV SEPTAL E/E' RATIO: 15.5 M/S
LYMPHOCYTES # BLD AUTO: 0.7 K/UL (ref 1–4.8)
LYMPHOCYTES NFR BLD: 6.2 % (ref 18–48)
MAGNESIUM SERPL-MCNC: 1.9 MG/DL (ref 1.6–2.6)
MCH RBC QN AUTO: 23.9 PG (ref 27–31)
MCHC RBC AUTO-ENTMCNC: 30.9 G/DL (ref 32–36)
MCV RBC AUTO: 77 FL (ref 82–98)
MONOCYTES # BLD AUTO: 1 K/UL (ref 0.3–1)
MONOCYTES NFR BLD: 9.2 % (ref 4–15)
MV PEAK A VEL: 0.69 M/S
MV PEAK E VEL: 1.24 M/S
MV STENOSIS PRESSURE HALF TIME: 67.5 MS
MV VALVE AREA P 1/2 METHOD: 3.26 CM2
NEUTROPHILS # BLD AUTO: 8.7 K/UL (ref 1.8–7.7)
NEUTROPHILS NFR BLD: 82.1 % (ref 38–73)
NRBC BLD-RTO: 0 /100 WBC
PHOSPHATE SERPL-MCNC: 8.1 MG/DL (ref 2.7–4.5)
PISA TR MAX VEL: 3.99 M/S
PLATELET # BLD AUTO: 387 K/UL (ref 150–450)
PMV BLD AUTO: 9.2 FL (ref 9.2–12.9)
POC IONIZED CALCIUM: 0.83 MMOL/L (ref 1.06–1.42)
POC TCO2 (MEASURED): 24 MMOL/L (ref 23–29)
POTASSIUM BLD-SCNC: 3.3 MMOL/L (ref 3.5–5.1)
POTASSIUM SERPL-SCNC: 3.4 MMOL/L (ref 3.5–5.1)
PROT SERPL-MCNC: 6.4 G/DL (ref 6–8.4)
RA MAJOR: 5.01 CM
RA PRESSURE ESTIMATED: 8 MMHG
RA WIDTH: 4.01 CM
RBC # BLD AUTO: 2.68 M/UL (ref 4.6–6.2)
RIGHT VENTRICULAR END-DIASTOLIC DIMENSION: 4 CM
RV TB RVSP: 12 MMHG
SAMPLE: ABNORMAL
SINUS: 3.53 CM
SODIUM BLD-SCNC: 129 MMOL/L (ref 136–145)
SODIUM SERPL-SCNC: 133 MMOL/L (ref 136–145)
SPECIMEN OUTDATE: NORMAL
STJ: 3.51 CM
TDI LATERAL: 0.08 M/S
TDI SEPTAL: 0.08 M/S
TDI: 0.08 M/S
TR MAX PG: 64 MMHG
TRANS ERYTHROCYTES VOL PATIENT: NORMAL ML
TRICUSPID ANNULAR PLANE SYSTOLIC EXCURSION: 1.76 CM
TV REST PULMONARY ARTERY PRESSURE: 72 MMHG
WBC # BLD AUTO: 10.59 K/UL (ref 3.9–12.7)
Z-SCORE OF LEFT VENTRICULAR DIMENSION IN END DIASTOLE: -1.48
Z-SCORE OF LEFT VENTRICULAR DIMENSION IN END SYSTOLE: -0.48

## 2023-12-29 PROCEDURE — 25000003 PHARM REV CODE 250: Performed by: INTERNAL MEDICINE

## 2023-12-29 PROCEDURE — 86901 BLOOD TYPING SEROLOGIC RH(D): CPT

## 2023-12-29 PROCEDURE — 93005 ELECTROCARDIOGRAM TRACING: CPT

## 2023-12-29 PROCEDURE — 85025 COMPLETE CBC W/AUTO DIFF WBC: CPT | Performed by: INTERNAL MEDICINE

## 2023-12-29 PROCEDURE — 02HV33Z INSERTION OF INFUSION DEVICE INTO SUPERIOR VENA CAVA, PERCUTANEOUS APPROACH: ICD-10-PCS | Performed by: INTERNAL MEDICINE

## 2023-12-29 PROCEDURE — 5A1D70Z PERFORMANCE OF URINARY FILTRATION, INTERMITTENT, LESS THAN 6 HOURS PER DAY: ICD-10-PCS | Performed by: HOSPITALIST

## 2023-12-29 PROCEDURE — 80100014 HC HEMODIALYSIS 1:1

## 2023-12-29 PROCEDURE — 20000000 HC ICU ROOM

## 2023-12-29 PROCEDURE — 99233 SBSQ HOSP IP/OBS HIGH 50: CPT | Mod: FS,GC,, | Performed by: STUDENT IN AN ORGANIZED HEALTH CARE EDUCATION/TRAINING PROGRAM

## 2023-12-29 PROCEDURE — 25000003 PHARM REV CODE 250: Performed by: STUDENT IN AN ORGANIZED HEALTH CARE EDUCATION/TRAINING PROGRAM

## 2023-12-29 PROCEDURE — 83735 ASSAY OF MAGNESIUM: CPT | Performed by: INTERNAL MEDICINE

## 2023-12-29 PROCEDURE — 63600175 PHARM REV CODE 636 W HCPCS

## 2023-12-29 PROCEDURE — P9021 RED BLOOD CELLS UNIT: HCPCS

## 2023-12-29 PROCEDURE — 99223 1ST HOSP IP/OBS HIGH 75: CPT | Mod: ,,, | Performed by: INTERNAL MEDICINE

## 2023-12-29 PROCEDURE — 25000003 PHARM REV CODE 250

## 2023-12-29 PROCEDURE — 86920 COMPATIBILITY TEST SPIN: CPT

## 2023-12-29 PROCEDURE — 0JH63XZ INSERTION OF TUNNELED VASCULAR ACCESS DEVICE INTO CHEST SUBCUTANEOUS TISSUE AND FASCIA, PERCUTANEOUS APPROACH: ICD-10-PCS | Performed by: INTERNAL MEDICINE

## 2023-12-29 PROCEDURE — 99291 CRITICAL CARE FIRST HOUR: CPT | Mod: GC,,, | Performed by: INTERNAL MEDICINE

## 2023-12-29 PROCEDURE — 80053 COMPREHEN METABOLIC PANEL: CPT | Performed by: INTERNAL MEDICINE

## 2023-12-29 PROCEDURE — 84100 ASSAY OF PHOSPHORUS: CPT | Performed by: INTERNAL MEDICINE

## 2023-12-29 PROCEDURE — 93010 ELECTROCARDIOGRAM REPORT: CPT | Mod: ,,, | Performed by: INTERNAL MEDICINE

## 2023-12-29 PROCEDURE — 30233N1 TRANSFUSION OF NONAUTOLOGOUS RED BLOOD CELLS INTO PERIPHERAL VEIN, PERCUTANEOUS APPROACH: ICD-10-PCS | Performed by: HOSPITALIST

## 2023-12-29 RX ORDER — SODIUM CHLORIDE 9 MG/ML
INJECTION, SOLUTION INTRAVENOUS ONCE
Status: DISCONTINUED | OUTPATIENT
Start: 2023-12-29 | End: 2024-01-02

## 2023-12-29 RX ORDER — SODIUM CHLORIDE 9 MG/ML
INJECTION, SOLUTION INTRAVENOUS CONTINUOUS
Status: DISCONTINUED | OUTPATIENT
Start: 2023-12-29 | End: 2024-01-19 | Stop reason: HOSPADM

## 2023-12-29 RX ORDER — HEPARIN SODIUM 5000 [USP'U]/ML
5000 INJECTION, SOLUTION INTRAVENOUS; SUBCUTANEOUS EVERY 8 HOURS
Status: DISCONTINUED | OUTPATIENT
Start: 2023-12-29 | End: 2024-01-05

## 2023-12-29 RX ORDER — HYDROCODONE BITARTRATE AND ACETAMINOPHEN 500; 5 MG/1; MG/1
TABLET ORAL
Status: DISCONTINUED | OUTPATIENT
Start: 2023-12-29 | End: 2024-01-12

## 2023-12-29 RX ORDER — FAMOTIDINE 20 MG/1
20 TABLET, FILM COATED ORAL DAILY
Status: DISCONTINUED | OUTPATIENT
Start: 2023-12-29 | End: 2024-01-19 | Stop reason: HOSPADM

## 2023-12-29 RX ADMIN — CEFEPIME 2 G: 2 INJECTION, POWDER, FOR SOLUTION INTRAVENOUS at 09:12

## 2023-12-29 RX ADMIN — ATORVASTATIN CALCIUM 80 MG: 40 TABLET, FILM COATED ORAL at 10:12

## 2023-12-29 RX ADMIN — SODIUM CHLORIDE: 9 INJECTION, SOLUTION INTRAVENOUS at 09:12

## 2023-12-29 RX ADMIN — ALLOPURINOL 100 MG: 100 TABLET ORAL at 10:12

## 2023-12-29 RX ADMIN — FAMOTIDINE 20 MG: 20 TABLET, FILM COATED ORAL at 10:12

## 2023-12-29 RX ADMIN — HEPARIN SODIUM 5000 UNITS: 5000 INJECTION INTRAVENOUS; SUBCUTANEOUS at 09:12

## 2023-12-29 RX ADMIN — ACETAMINOPHEN 650 MG: 325 TABLET ORAL at 02:12

## 2023-12-29 RX ADMIN — CEFEPIME 2 G: 2 INJECTION, POWDER, FOR SOLUTION INTRAVENOUS at 10:12

## 2023-12-29 RX ADMIN — FOLIC ACID 1 MG: 1 TABLET ORAL at 10:12

## 2023-12-29 NOTE — ASSESSMENT & PLAN NOTE
Creatine stable for now. BMP reviewed- noted CrCl cannot be calculated (Unknown ideal weight.). according to latest data. Based on current GFR, CKD stage is stage 5 - GFR < 15.  Monitor UOP and serial BMP and adjust therapy as needed. Renally dose meds. Avoid nephrotoxic medications and procedures. See ARF

## 2023-12-29 NOTE — HPI
73 year old man with a history of longstanding T2DM, HTN, HFpEF, severe aortic stenosis, chronic bradycardia, CKD3b, newly diagnosed bilateral heel osteo on daptomycin admitted from nursing home for electrolyte abnormalities, bilateral flank pain, leg pain. Per report NH obtained labs had had a BUN of 170 for which he was transferred to [facility] for further evaluation. In the ED the BUN of 170 was confirmed along with a creatinine of 7.5 (baseline creatinine 2-2.5 as of 2 weeks ago). Of note, K is 3.6 with CO2 of 23. A jackson was placed with 500 purulent urine out and per family over phone had been dealing with some prostate issues in the past. UA revealed a specific gravity of 1.010, pH of 6, 2+ protein (in setting of ELIZABTEH), 3+ leukocytes with many bacteria and WBC clumps.     Nephrology consulted for ELIZABETH - presumed ATN with possible HRS components as well in setting of pyelonephritis. Dialysis consent was obtained 12/28/23 and started on HD 12/29. Interventional nephrology consulted for tunneled dialysis catheter.

## 2023-12-29 NOTE — ASSESSMENT & PLAN NOTE
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 Pt disoriented, somnolent, responds intermittently to voice. Currently able to protect airway but will continue to monitor. Nephrology consulted for HD. Treating UTI with abx.

## 2023-12-29 NOTE — HPI
73 year old male with DM2, HTN, CAD, HFpEF, HLD, CKD3b, and hx of alcohol use disorder with recent admission for MRSA osteomyelitis of BL heel ulcers on IV dapto end date 1/10/24 who presented from [facility] with RLQ pain, fatigue, bradycardia ~30's, weakness, and lab abnormalities including Cr 7.1 and . On interview patient lethargic and disoriented and poor historian, history obtained via chart review.   In ED pt AF, bradycardic ~40's, RR in the mid 20's, BP ~110/50, satting 100% on 2L NC. Labs significant for Hb 7.2, ANC 8.3, Na 132, Cl 87, HCO3 23, AG 22, , Cr 7.5, phos 8.3, Albumin 1.9, AST 74, , Lipase 146. Patient with urinary retention and jackson placed with purulent 500ml output . EKG with complete heart block. Patient initially admitted to medicine but transferred to CCU given complete heart block

## 2023-12-29 NOTE — ASSESSMENT & PLAN NOTE
Acute Cystitis without Hematuria    This 73 y.o. @gender@ presented with the following signs & symptoms of a UTI: dysuria, foul smelling urine, hesitancy, and inability to void.  They do have signs/sxs that suggest infection extends beyond the bladder,   incl flank pain, CVA tenderness.      Suspect this patient has Complicated cystitis.    PLAN:  - F/u urine gram stain and culture  - Ceftriaxone 2g IV q24h starting tonight, received CTX in ED  - If pt is/becomes febrile then draw 2 blood culture tubes, each from different site  - Initiate Sepsis protocols if the patient presented to [facility] with 2+ SIRS, or has an acute change in SOFA score of 2 or more

## 2023-12-29 NOTE — ASSESSMENT & PLAN NOTE
Endorses ~2wks of persistent epigastric tenderness with intermittent colicky RLQ pain. Endorses decreased appetite. No NVD/constipation. CT A&P pending

## 2023-12-29 NOTE — ASSESSMENT & PLAN NOTE
Suspect this patient has Complicated cystitis given retention and positive UA  - F/u urine gram stain and culture  - Cefepime 2g IV q24h starting tonight, received CTX in ED  - If pt is/becomes febrile then draw 2 blood culture tubes, each from different site

## 2023-12-29 NOTE — SUBJECTIVE & OBJECTIVE
Past Medical History:   Diagnosis Date    Acute congestive heart failure 3/20/2020    Alcohol abuse     Arthritis     Asthma attack 11/24/2021    Coronary artery disease     Diabetes mellitus     Hyperlipemia     Hypertension     Multiple thyroid nodules 6/14/2023    Rhabdomyolysis        Past Surgical History:   Procedure Laterality Date    ECHOCARDIOGRAM,TRANSESOPHAGEAL N/A 9/21/2023    Procedure: Transesophageal echo (MARIA) intra-procedure log documentation;  Surgeon: Luisana Rodríguez MD;  Location: [facility] CATH LAB;  Service: Cardiology;  Laterality: N/A;    EYE SURGERY      IRRIGATION AND DEBRIDEMENT Bilateral 12/1/2023    Procedure: IRRIGATION AND DEBRIDEMENT;  Surgeon: Jenna Gutiérrez DPM;  Location: [facility] OR;  Service: Podiatry;  Laterality: Bilateral;  Request antibiotic beads    TRANSESOPHAGEAL ECHOCARDIOGRAM WITH POSSIBLE CARDIOVERSION (MARIA W/ POSS CARDIOVERSION) N/A 1/5/2023    Procedure: Transesophageal echo (MARIA) intra-procedure log documentation;  Surgeon: Indra Foote MD;  Location: [facility] CATH LAB;  Service: Cardiology;  Laterality: N/A;    TRANSESOPHAGEAL ECHOCARDIOGRAPHY N/A 9/21/2023    Procedure: ECHOCARDIOGRAM, TRANSESOPHAGEAL;  Surgeon: Luisana Rodríguez MD;  Location: [facility] CATH LAB;  Service: Cardiology;  Laterality: N/A;    TREATMENT OF CARDIAC ARRHYTHMIA N/A 12/7/2020    Procedure: Cardioversion or Defibrillation;  Surgeon: Indra Foote MD;  Location: [facility] CATH LAB;  Service: Cardiology;  Laterality: N/A;    TREATMENT OF CARDIAC ARRHYTHMIA N/A 12/30/2020    Procedure: Cardioversion or Defibrillation;  Surgeon: Tyrone Steen MD;  Location: [facility] CATH LAB;  Service: Cardiology;  Laterality: N/A;    TREATMENT OF CARDIAC ARRHYTHMIA N/A 1/5/2023    Procedure: Cardioversion or Defibrillation;  Surgeon: Indra Fooet MD;  Location: [facility] CATH LAB;  Service: Cardiology;  Laterality: N/A;    VASCULAR SURGERY         Review of patient's allergies indicates:  No Known Allergies    No  current facility-administered medications on file prior to encounter.     Current Outpatient Medications on File Prior to Encounter   Medication Sig    albuterol (PROVENTIL/VENTOLIN HFA) 90 mcg/actuation inhaler Inhale 2 puffs into the lungs every 6 (six) hours as needed for Wheezing or Shortness of Breath.    allopurinoL (ZYLOPRIM) 100 MG tablet Take 1 tablet (100 mg total) by mouth once daily.    amiodarone (PACERONE) 200 MG Tab Take 200 mg by mouth once daily.    ascorbic acid, vitamin C, (VITAMIN C) 500 MG tablet Take 500 mg by mouth 2 (two) times daily.    ELIQUIS 5 mg Tab Take 5 mg by mouth 2 (two) times daily.    ferrous sulfate (FEOSOL) 325 mg (65 mg iron) Tab tablet Take 325 mg by mouth once daily.    folic acid (FOLVITE) 1 MG tablet Take 1 mg by mouth once daily.    furosemide (LASIX) 80 MG tablet Take 1 tablet (80 mg total) by mouth 2 (two) times daily.    gabapentin (NEURONTIN) 100 MG capsule Take 1 capsule (100 mg total) by mouth 2 (two) times daily.    insulin aspart U-100 (NOVOLOG) 100 unit/mL injection Inject into the skin. Per sliding scale    metOLazone (ZAROXOLYN) 5 MG tablet Take 1 tablet (5 mg total) by mouth once daily.    metoprolol succinate (TOPROL-XL) 25 MG 24 hr tablet Take 25 mg by mouth once daily.    multivitamin (ONE DAILY MULTIVITAMIN) per tablet Take 1 tablet by mouth once daily.    potassium chloride (MICRO-K) 10 MEQ CpSR Take 10 mEq by mouth once daily.    rosuvastatin (CRESTOR) 40 MG Tab Take 40 mg by mouth every evening.    senna-docusate 8.6-50 mg (PERICOLACE) 8.6-50 mg per tablet Take 2 tablets by mouth 2 (two) times daily as needed for Constipation.    tamsulosin (FLOMAX) 0.4 mg Cap Take 1 capsule (0.4 mg total) by mouth once daily.     Family History       Problem Relation (Age of Onset)    Diabetes Mother, Father, Sister    Hypertension Mother, Father, Sister          Tobacco Use    Smoking status: Never    Smokeless tobacco: Never   Substance and Sexual Activity    Alcohol  use: Not Currently     Alcohol/week: 6.0 standard drinks of alcohol     Types: 6 Cans of beer per week    Drug use: No    Sexual activity: Yes     Partners: Female       Objective:     Vital Signs (Most Recent):  Temp: 98.1 °F (36.7 °C) (12/28/23 1453)  Pulse: (!) 46 (12/28/23 1632)  Resp: (!) 21 (12/28/23 1631)  BP: (!) 111/51 (12/28/23 1632)  SpO2: 100 % (12/28/23 1632) Vital Signs (24h Range):  Temp:  [98.1 °F (36.7 °C)] 98.1 °F (36.7 °C)  Pulse:  [43-46] 46  Resp:  [21-24] 21  SpO2:  [91 %-100 %] 100 %  BP: (108-111)/(51-54) 111/51        There is no height or weight on file to calculate BMI.     Physical Exam  Constitutional:       General: He is in acute distress.      Appearance: He is ill-appearing.   HENT:      Head: Normocephalic and atraumatic.      Right Ear: External ear normal.      Left Ear: External ear normal.      Nose: Nose normal.      Mouth/Throat:      Mouth: Mucous membranes are dry.   Eyes:      Extraocular Movements: Extraocular movements intact.      Pupils: Pupils are equal, round, and reactive to light.   Cardiovascular:      Rate and Rhythm: Regular rhythm. Bradycardia present.      Heart sounds: Murmur heard.   Pulmonary:      Effort: No respiratory distress.      Breath sounds: No wheezing or rales.   Abdominal:      General: Abdomen is flat.      Palpations: Abdomen is soft.      Tenderness: There is abdominal tenderness.      Comments: Epigastric tenderness to palpation, colicky RLQ pain   Musculoskeletal:      Cervical back: Normal range of motion.      Right lower leg: Edema present.      Left lower leg: Edema present.   Skin:     General: Skin is warm and dry.   Neurological:      Mental Status: He is disoriented.      Motor: Weakness present.              CRANIAL NERVES     CN III, IV, VI   Pupils are equal, round, and reactive to light.       Significant Labs: All pertinent labs within the past 24 hours have been reviewed.    Significant Imaging: I have reviewed all pertinent  imaging results/findings within the past 24 hours.

## 2023-12-29 NOTE — PROGRESS NOTES
[facility] - Emergency Dept  Nephrology  Progress Note    Patient Name: Chato Bowie  MRN: [id number]  Admission Date: 12/28/2023  Hospital Length of Stay: 1 days  Attending Provider: Frantz Gross MD   Primary Care Physician: Irlanda Valenzuela -  Principal Problem:Complete heart block    Subjective:     HPI: 73 year old man with a history of longstanding T2DM, HTN, HFpEF, severe aortic stenosis, chronic bradycardia, CKD3b, newly diagnosed bilateral heel osteo on daptomycin admitted from nursing home for electrolyte abnormalities, bilateral flank pain, leg pain. Per report NH obtained labs had had a BUN of 170 for which he was transferred to [facility] [place] for further evaluation. In the ED the BUN of 170 was confirmed along with a creatinine of 7.5 (baseline creatinine 2-2.5 as of 2 weeks ago). Of note, K is 3.6 with CO2 of 23. A jackson was placed with 500 purulent urine out and per family over phone had been dealing with some prostate issues in the past. UA revealed a specific gravity of 1.010, pH of 6, 2+ protein (in setting of ELIZABETH), 3+ leukocytes with many bacteria and WBC clumps.     Nephrology consulted for ELIZABETH and possible dialysis needs    Interval History:   Renal function continues to worsen despite IVFs (sp 2.5 L of NS yesterday). sCr up to 7.7 from 7.5. . Other electrolytes stable. Mentation poor on exam. 700 mL of UOP charted.   HD consent obtained.   Cardiology following.     Review of patient's allergies indicates:  No Known Allergies  Current Facility-Administered Medications   Medication Frequency    0.9%  NaCl infusion (for blood administration) Q24H PRN    0.9%  NaCl infusion Once    acetaminophen tablet 650 mg Q4H PRN    [START ON 12/30/2023] allopurinol split tablet 50 mg Daily    atorvastatin tablet 80 mg Daily    ceFEPIme (MAXIPIME) 2 g in dextrose 5 % in water (D5W) 100 mL IVPB (MB+) Q12H    DAPTOmycin (CUBICIN) 650 mg in sodium chloride 0.9% SolP 50 mL IVPB Q48H    dextrose 10% bolus 125 mL  125 mL PRN    dextrose 10% bolus 250 mL 250 mL PRN    famotidine tablet 20 mg Daily    folic acid tablet 1 mg Daily    glucagon (human recombinant) injection 1 mg PRN    glucose chewable tablet 16 g PRN    glucose chewable tablet 24 g PRN    heparin (porcine) injection 5,000 Units Q8H    insulin aspart U-100 pen 0-5 Units QID (AC + HS) PRN    magnesium oxide tablet 800 mg PRN    naloxone 0.4 mg/mL injection 0.02 mg PRN    ondansetron disintegrating tablet 8 mg Q8H PRN    potassium bicarbonate disintegrating tablet 35 mEq PRN    potassium, sodium phosphates 280-160-250 mg packet 2 packet PRN    senna-docusate 8.6-50 mg per tablet 2 tablet BID PRN    sodium chloride 0.9% flush 10 mL Q12H PRN     Current Outpatient Medications   Medication    albuterol (PROVENTIL/VENTOLIN HFA) 90 mcg/actuation inhaler    allopurinoL (ZYLOPRIM) 100 MG tablet    amiodarone (PACERONE) 200 MG Tab    ascorbic acid, vitamin C, (VITAMIN C) 500 MG tablet    ELIQUIS 5 mg Tab    ferrous sulfate (FEOSOL) 325 mg (65 mg iron) Tab tablet    folic acid (FOLVITE) 1 MG tablet    furosemide (LASIX) 80 MG tablet    gabapentin (NEURONTIN) 100 MG capsule    insulin aspart U-100 (NOVOLOG) 100 unit/mL injection    metOLazone (ZAROXOLYN) 5 MG tablet    metoprolol succinate (TOPROL-XL) 25 MG 24 hr tablet    multivitamin (ONE DAILY MULTIVITAMIN) per tablet    potassium chloride (MICRO-K) 10 MEQ CpSR    rosuvastatin (CRESTOR) 40 MG Tab    senna-docusate 8.6-50 mg (PERICOLACE) 8.6-50 mg per tablet    tamsulosin (FLOMAX) 0.4 mg Cap       Objective:     Vital Signs (Most Recent):  Temp: 98.5 °F (36.9 °C) (12/29/23 1415)  Pulse: (!) 45 (12/29/23 1415)  Resp: 20 (12/29/23 1415)  BP: 130/63 (12/29/23 1415)  SpO2: 98 % (12/29/23 1415) Vital Signs (24h Range):  Temp:  [97.7 °F (36.5 °C)-98.6 °F (37 °C)] 98.5 °F (36.9 °C)  Pulse:  [42-51] 45  Resp:  [17-34] 20  SpO2:  [91 %-100 %] 98 %  BP: (107-135)/(51-74) 130/63     Weight: 80.7 kg (178 lb) (12/29/23 0703)  Body mass  index is 24.83 kg/m².  Body surface area is 2.01 meters squared.    I/O last 3 completed shifts:  In: -   Out: 500 [Urine:500]     Physical Exam  Vitals and nursing note reviewed.   Constitutional:       General: He is sleeping. He is not in acute distress.     Appearance: He is normal weight. He is ill-appearing. He is not toxic-appearing.      Interventions: Nasal cannula in place.   HENT:      Head: Normocephalic and atraumatic.      Nose: Nose normal.      Mouth/Throat:      Mouth: Mucous membranes are dry.      Pharynx: No oropharyngeal exudate.   Eyes:      General: No scleral icterus.        Right eye: No discharge.         Left eye: No discharge.      Pupils: Pupils are equal, round, and reactive to light.   Cardiovascular:      Rate and Rhythm: Bradycardia present.   Pulmonary:      Effort: Pulmonary effort is normal. No respiratory distress.      Breath sounds: No wheezing or rales.   Abdominal:      General: Abdomen is flat.      Tenderness: There is abdominal tenderness (bilateral flank).   Musculoskeletal:      Cervical back: Normal range of motion. No rigidity.      Comments: Bilateral heels wrapped in ACE bandages.    Lymphadenopathy:      Cervical: No cervical adenopathy.   Neurological:      Mental Status: He is easily aroused.      Comments: Difficult to understand, however is following commands. Alert to name.          Significant Labs:  CBC:   Recent Labs   Lab 12/29/23  0446   WBC 10.59   RBC 2.68*   HGB 6.4*   HCT 20.7*      MCV 77*   MCH 23.9*   MCHC 30.9*     CMP:   Recent Labs   Lab 12/29/23  0446      CALCIUM 8.3*   ALBUMIN 2.0*   PROT 6.4   *   K 3.4*   CO2 24   CL 90*   *   CREATININE 7.7*   ALKPHOS 88   ALT 91*   AST 63*   BILITOT 0.5     All labs within the past 24 hours have been reviewed.     Assessment/Plan:     Cardiac/Vascular  * Complete heart block  - defer to primary team     Renal/  Acute kidney injury superimposed on CKD  Baseline creatinine 2-2.5  (longstanding DM with bilateral osteomyelitis, HTN)  Multiple comorbitites as well    On admission serum creatinine 7.5  ELIZABETH appears to be multifactorial in setting of obstructive nephropathy, probably pyelonephritis, low effective circulating volume, possible AIN in setting of longstanding ABX (though daptomycin less likely)    Obstruction relieved by jackson placed 12/28/23 with 500 cc of purulent UOP. Supposedly follows with urology in outpatient setting. Is on tamsulosin.   Pyelonephritis with UA showing 3+ leuks with many bacteria. History of proteus in past.   Low effective circulating volume suggested by physical exam, evidence of acute liver injury in setting of HFpEF with severe aortic stenosis. Home medication included metolazone 1 mg bid    Dialysis consent obtained 12/28/23    Plan/Recommendation  Will plan for short 2 hr intermittent HD session today for clearance ONLY. Labs adjusted per protocol.   Will need additional clearance Saturday (12/30) and likely Sunday / Monday for ongoing clearance and volume management.   Continue abx (Ceftriaxone started 12/28/23)  Continue jackson and monitor UOP  -Strict ins and outs  -Avoid nephrotoxic agents if possible and renally dose medications  -Avoid drastic hemodynamic changes if possible        UTI (urinary tract infection)  See ELIZABETH    Stage 3b chronic kidney disease (CKD)  See ELIZABETH        I will follow-up with patient. Please contact us if you have any additional questions.  Haleigh Camp DNP, FNP-C  Nephrology  [facility] - Emergency Dept

## 2023-12-29 NOTE — SUBJECTIVE & OBJECTIVE
Past Medical History:   Diagnosis Date    Acute congestive heart failure 3/20/2020    Alcohol abuse     Arthritis     Asthma attack 11/24/2021    Coronary artery disease     Diabetes mellitus     Hyperlipemia     Hypertension     Multiple thyroid nodules 6/14/2023    Rhabdomyolysis        Past Surgical History:   Procedure Laterality Date    ECHOCARDIOGRAM,TRANSESOPHAGEAL N/A 9/21/2023    Procedure: Transesophageal echo (MARIA) intra-procedure log documentation;  Surgeon: Luisana Rodríguez MD;  Location: [facility] CATH LAB;  Service: Cardiology;  Laterality: N/A;    EYE SURGERY      IRRIGATION AND DEBRIDEMENT Bilateral 12/1/2023    Procedure: IRRIGATION AND DEBRIDEMENT;  Surgeon: Jenna Gutiérrez DPM;  Location: [facility] OR;  Service: Podiatry;  Laterality: Bilateral;  Request antibiotic beads    TRANSESOPHAGEAL ECHOCARDIOGRAM WITH POSSIBLE CARDIOVERSION (MARIA W/ POSS CARDIOVERSION) N/A 1/5/2023    Procedure: Transesophageal echo (MARIA) intra-procedure log documentation;  Surgeon: Indra Foote MD;  Location: [facility] CATH LAB;  Service: Cardiology;  Laterality: N/A;    TRANSESOPHAGEAL ECHOCARDIOGRAPHY N/A 9/21/2023    Procedure: ECHOCARDIOGRAM, TRANSESOPHAGEAL;  Surgeon: Luisana Rodríguez MD;  Location: [facility] CATH LAB;  Service: Cardiology;  Laterality: N/A;    TREATMENT OF CARDIAC ARRHYTHMIA N/A 12/7/2020    Procedure: Cardioversion or Defibrillation;  Surgeon: Indra Foote MD;  Location: [facility] CATH LAB;  Service: Cardiology;  Laterality: N/A;    TREATMENT OF CARDIAC ARRHYTHMIA N/A 12/30/2020    Procedure: Cardioversion or Defibrillation;  Surgeon: Tyrone Steen MD;  Location: [facility] CATH LAB;  Service: Cardiology;  Laterality: N/A;    TREATMENT OF CARDIAC ARRHYTHMIA N/A 1/5/2023    Procedure: Cardioversion or Defibrillation;  Surgeon: Indra Foote MD;  Location: [facility] CATH LAB;  Service: Cardiology;  Laterality: N/A;    VASCULAR SURGERY         Review of patient's allergies indicates:  No Known Allergies    No  current facility-administered medications on file prior to encounter.     Current Outpatient Medications on File Prior to Encounter   Medication Sig    albuterol (PROVENTIL/VENTOLIN HFA) 90 mcg/actuation inhaler Inhale 2 puffs into the lungs every 6 (six) hours as needed for Wheezing or Shortness of Breath.    allopurinoL (ZYLOPRIM) 100 MG tablet Take 1 tablet (100 mg total) by mouth once daily.    amiodarone (PACERONE) 200 MG Tab Take 200 mg by mouth once daily.    ascorbic acid, vitamin C, (VITAMIN C) 500 MG tablet Take 500 mg by mouth 2 (two) times daily.    ELIQUIS 5 mg Tab Take 5 mg by mouth 2 (two) times daily.    ferrous sulfate (FEOSOL) 325 mg (65 mg iron) Tab tablet Take 325 mg by mouth once daily.    folic acid (FOLVITE) 1 MG tablet Take 1 mg by mouth once daily.    furosemide (LASIX) 80 MG tablet Take 1 tablet (80 mg total) by mouth 2 (two) times daily.    gabapentin (NEURONTIN) 100 MG capsule Take 1 capsule (100 mg total) by mouth 2 (two) times daily.    insulin aspart U-100 (NOVOLOG) 100 unit/mL injection Inject into the skin. Per sliding scale    metOLazone (ZAROXOLYN) 5 MG tablet Take 1 tablet (5 mg total) by mouth once daily.    metoprolol succinate (TOPROL-XL) 25 MG 24 hr tablet Take 25 mg by mouth once daily.    multivitamin (ONE DAILY MULTIVITAMIN) per tablet Take 1 tablet by mouth once daily.    potassium chloride (MICRO-K) 10 MEQ CpSR Take 10 mEq by mouth once daily.    rosuvastatin (CRESTOR) 40 MG Tab Take 40 mg by mouth every evening.    senna-docusate 8.6-50 mg (PERICOLACE) 8.6-50 mg per tablet Take 2 tablets by mouth 2 (two) times daily as needed for Constipation.    tamsulosin (FLOMAX) 0.4 mg Cap Take 1 capsule (0.4 mg total) by mouth once daily.     Family History       Problem Relation (Age of Onset)    Diabetes Mother, Father, Sister    Hypertension Mother, Father, Sister          Tobacco Use    Smoking status: Never    Smokeless tobacco: Never   Substance and Sexual Activity    Alcohol  use: Not Currently     Alcohol/week: 6.0 standard drinks of alcohol     Types: 6 Cans of beer per week    Drug use: No    Sexual activity: Yes     Partners: Female     Review of Systems   Unable to perform ROS: Other   Constitutional: Positive for night sweats.   Poor historian     Objective:     Vital Signs (Most Recent):  Temp: 98.4 °F (36.9 °C) (12/29/23 0430)  Pulse: (!) 46 (12/29/23 0430)  Resp: 17 (12/29/23 0430)  BP: (!) 119/58 (12/29/23 0430)  SpO2: 100 % (12/29/23 0430) Vital Signs (24h Range):  Temp:  [98.1 °F (36.7 °C)-98.4 °F (36.9 °C)] 98.4 °F (36.9 °C)  Pulse:  [42-51] 46  Resp:  [17-24] 17  SpO2:  [91 %-100 %] 100 %  BP: (108-135)/(51-60) 119/58     Weight: 81 kg (178 lb 9.2 oz)  Body mass index is 24.91 kg/m².    SpO2: 100 %         Intake/Output Summary (Last 24 hours) at 12/29/2023 0550  Last data filed at 12/28/2023 1841  Gross per 24 hour   Intake --   Output 500 ml   Net -500 ml       Lines/Drains/Airways       Peripherally Inserted Central Catheter Line  Duration             PICC Double Lumen 12/07/23 1457 right brachial 21 days              Drain  Duration                  Urethral Catheter 12/28/23 1600 Double-lumen 16 Fr. <1 day                     Physical Exam  Constitutional:       General: He is not in acute distress.     Appearance: He is ill-appearing.   HENT:      Mouth/Throat:      Mouth: Mucous membranes are dry.   Cardiovascular:      Rate and Rhythm: Bradycardia present.      Heart sounds: Murmur heard.   Pulmonary:      Effort: Pulmonary effort is normal.      Breath sounds: Normal breath sounds.   Abdominal:      General: Abdomen is flat. There is no distension.      Palpations: Abdomen is soft.      Tenderness: There is abdominal tenderness.   Musculoskeletal:      Right lower leg: No edema.      Left lower leg: No edema.   Skin:     General: Skin is warm.   Neurological:      Mental Status: He is disoriented.          Significant Labs: CMP   Recent Labs   Lab 12/28/23  1531  23  0446   * 133*   K 3.6 3.4*   CL 87* 90*   CO2 23 24   * 109   *  --    CREATININE 7.5* 7.7*   CALCIUM 8.5* 8.3*   PROT 6.6 6.4   ALBUMIN 1.9* 2.0*   BILITOT 0.6 0.5   ALKPHOS 91 88   AST 74* 63*   * 91*   ANIONGAP 22* 19*   , CBC   Recent Labs   Lab 23  1531 23  0446   WBC 10.05 10.59   HGB 7.2* 6.4*   HCT 22.9* 20.7*    387   , and All pertinent lab results from the last 24 hours have been reviewed.    Significant Imagin/15/2023  The left ventricle is normal in size with concentric hypertrophy and normal systolic function.  The estimated ejection fraction is 60%.  Grade II left ventricular diastolic dysfunction.  Normal right ventricular size with normal right ventricular systolic function.  Moderate left atrial enlargement.  There is moderate aortic valve stenosis.  Aortic valve area is 1.43 cm2; peak velocity is 3.94 m/s; mean gradient is 31 mmHg.  Mild-to-moderate aortic regurgitation.  Mild mitral regurgitation.  Normal central venous pressure (3 mmHg).  The estimated PA systolic pressure is 39 mmHg.    MARIA 2023        Left Ventricle: Normal wall motion. There is normal systolic function with a visually estimated ejection fraction of 60 - 65%.    Left Atrium: Left atrium is dilated.No significant mass identified in left atrium    Right Ventricle: Right ventricular enlargement. Systolic function is reduced.    Right Atrium: Right atrium is dilated.    Aortic Valve: The aortic valve is a trileaflet valve. There is severe stenosis. There is moderate aortic regurgitation.    Mitral Valve: There is moderate regurgitation.    Tricuspid Valve: There is mild regurgitation.

## 2023-12-29 NOTE — SUBJECTIVE & OBJECTIVE
Past Medical History:   Diagnosis Date    Acute congestive heart failure 3/20/2020    Alcohol abuse     Arthritis     Asthma attack 11/24/2021    Coronary artery disease     Diabetes mellitus     Hyperlipemia     Hypertension     Multiple thyroid nodules 6/14/2023    Rhabdomyolysis        Past Surgical History:   Procedure Laterality Date    ECHOCARDIOGRAM,TRANSESOPHAGEAL N/A 9/21/2023    Procedure: Transesophageal echo (MARIA) intra-procedure log documentation;  Surgeon: Luisana Rodríguez MD;  Location: [facility] CATH LAB;  Service: Cardiology;  Laterality: N/A;    EYE SURGERY      IRRIGATION AND DEBRIDEMENT Bilateral 12/1/2023    Procedure: IRRIGATION AND DEBRIDEMENT;  Surgeon: Jenna Gutiérrez DPM;  Location: [facility] OR;  Service: Podiatry;  Laterality: Bilateral;  Request antibiotic beads    TRANSESOPHAGEAL ECHOCARDIOGRAM WITH POSSIBLE CARDIOVERSION (MARIA W/ POSS CARDIOVERSION) N/A 1/5/2023    Procedure: Transesophageal echo (MARIA) intra-procedure log documentation;  Surgeon: Indra Foote MD;  Location: [facility] CATH LAB;  Service: Cardiology;  Laterality: N/A;    TRANSESOPHAGEAL ECHOCARDIOGRAPHY N/A 9/21/2023    Procedure: ECHOCARDIOGRAM, TRANSESOPHAGEAL;  Surgeon: Luisana Rodríguez MD;  Location: [facility] CATH LAB;  Service: Cardiology;  Laterality: N/A;    TREATMENT OF CARDIAC ARRHYTHMIA N/A 12/7/2020    Procedure: Cardioversion or Defibrillation;  Surgeon: Indra Foote MD;  Location: [facility] CATH LAB;  Service: Cardiology;  Laterality: N/A;    TREATMENT OF CARDIAC ARRHYTHMIA N/A 12/30/2020    Procedure: Cardioversion or Defibrillation;  Surgeon: Tyrone Steen MD;  Location: [facility] CATH LAB;  Service: Cardiology;  Laterality: N/A;    TREATMENT OF CARDIAC ARRHYTHMIA N/A 1/5/2023    Procedure: Cardioversion or Defibrillation;  Surgeon: Indra Foote MD;  Location: [facility] CATH LAB;  Service: Cardiology;  Laterality: N/A;    VASCULAR SURGERY         Review of patient's allergies indicates:  No Known Allergies  Current  Facility-Administered Medications   Medication Frequency    acetaminophen tablet 650 mg Q4H PRN    [START ON 12/29/2023] allopurinoL tablet 100 mg Daily    [START ON 12/29/2023] atorvastatin tablet 80 mg Daily    ceFEPIme (MAXIPIME) 2 g in dextrose 5 % in water (D5W) 100 mL IVPB (MB+) Q12H    dextrose 10% bolus 125 mL 125 mL PRN    dextrose 10% bolus 250 mL 250 mL PRN    famotidine tablet 20 mg BID    [START ON 12/29/2023] folic acid tablet 1 mg Daily    glucagon (human recombinant) injection 1 mg PRN    glucose chewable tablet 16 g PRN    glucose chewable tablet 24 g PRN    insulin aspart U-100 pen 0-5 Units QID (AC + HS) PRN    magnesium oxide tablet 800 mg PRN    naloxone 0.4 mg/mL injection 0.02 mg PRN    ondansetron disintegrating tablet 8 mg Q8H PRN    potassium bicarbonate disintegrating tablet 35 mEq PRN    potassium, sodium phosphates 280-160-250 mg packet 2 packet PRN    senna-docusate 8.6-50 mg per tablet 2 tablet BID PRN    sodium chloride 0.9% flush 10 mL Q12H PRN     Current Outpatient Medications   Medication    albuterol (PROVENTIL/VENTOLIN HFA) 90 mcg/actuation inhaler    allopurinoL (ZYLOPRIM) 100 MG tablet    amiodarone (PACERONE) 200 MG Tab    ascorbic acid, vitamin C, (VITAMIN C) 500 MG tablet    ELIQUIS 5 mg Tab    ferrous sulfate (FEOSOL) 325 mg (65 mg iron) Tab tablet    folic acid (FOLVITE) 1 MG tablet    furosemide (LASIX) 80 MG tablet    gabapentin (NEURONTIN) 100 MG capsule    insulin aspart U-100 (NOVOLOG) 100 unit/mL injection    metOLazone (ZAROXOLYN) 5 MG tablet    metoprolol succinate (TOPROL-XL) 25 MG 24 hr tablet    multivitamin (ONE DAILY MULTIVITAMIN) per tablet    potassium chloride (MICRO-K) 10 MEQ CpSR    rosuvastatin (CRESTOR) 40 MG Tab    senna-docusate 8.6-50 mg (PERICOLACE) 8.6-50 mg per tablet    tamsulosin (FLOMAX) 0.4 mg Cap     Family History       Problem Relation (Age of Onset)    Diabetes Mother, Father, Sister    Hypertension Mother, Father, Sister          Tobacco  Use    Smoking status: Never    Smokeless tobacco: Never   Substance and Sexual Activity    Alcohol use: Not Currently     Alcohol/week: 6.0 standard drinks of alcohol     Types: 6 Cans of beer per week    Drug use: No    Sexual activity: Yes     Partners: Female     Review of Systems   Constitutional:  Positive for activity change and appetite change. Negative for diaphoresis, fatigue, fever and unexpected weight change.   HENT:  Positive for hearing loss, sore throat and trouble swallowing. Negative for congestion.    Respiratory:  Negative for cough, choking, chest tightness, shortness of breath and wheezing.    Cardiovascular:  Negative for chest pain and leg swelling.   Gastrointestinal:  Positive for abdominal pain. Negative for abdominal distention, nausea and vomiting.   Genitourinary:  Positive for difficulty urinating and flank pain.   Neurological:  Positive for tremors and speech difficulty. Negative for dizziness, seizures and facial asymmetry.     Objective:     Vital Signs (Most Recent):  Temp: 98.2 °F (36.8 °C) (12/28/23 1926)  Pulse: (!) 43 (12/28/23 1926)  Resp: 19 (12/28/23 1926)  BP: (!) 118/58 (12/28/23 1926)  SpO2: 100 % (12/28/23 1926) Vital Signs (24h Range):  Temp:  [98.1 °F (36.7 °C)-98.2 °F (36.8 °C)] 98.2 °F (36.8 °C)  Pulse:  [42-46] 43  Resp:  [19-24] 19  SpO2:  [91 %-100 %] 100 %  BP: (108-118)/(51-58) 118/58        There is no height or weight on file to calculate BMI.  There is no height or weight on file to calculate BSA.    I/O last 3 completed shifts:  In: -   Out: 500 [Urine:500]     Physical Exam  Constitutional:       General: He is not in acute distress.     Appearance: He is normal weight. He is ill-appearing. He is not toxic-appearing.   HENT:      Head: Normocephalic and atraumatic.      Nose: Nose normal.      Mouth/Throat:      Mouth: Mucous membranes are dry.      Pharynx: No oropharyngeal exudate.   Eyes:      General: No scleral icterus.        Right eye: No discharge.          Left eye: No discharge.      Pupils: Pupils are equal, round, and reactive to light.   Cardiovascular:      Rate and Rhythm: Bradycardia present.      Heart sounds: Murmur heard.   Pulmonary:      Effort: Pulmonary effort is normal. No respiratory distress.      Breath sounds: No wheezing or rales.      Comments: Speaking in full sentences  Abdominal:      General: Abdomen is flat.      Tenderness: There is abdominal tenderness (bilateral flank).   Musculoskeletal:      Cervical back: Normal range of motion. No rigidity.      Comments: Bilateral heels wrapped in ACE bandages.    Lymphadenopathy:      Cervical: No cervical adenopathy.   Neurological:      Mental Status: He is alert.      Comments: Difficult to understand, however is following commands. Alert to name and place, knows he is from a nursing home.  +tremor, axterixis          Significant Labs:  All labs within the past 24 hours have been reviewed.    Significant Imaging:  Labs: Reviewed

## 2023-12-29 NOTE — SUBJECTIVE & OBJECTIVE
Past Medical History:   Diagnosis Date    Acute congestive heart failure 3/20/2020    Alcohol abuse     Arthritis     Asthma attack 11/24/2021    Coronary artery disease     Diabetes mellitus     Hyperlipemia     Hypertension     Multiple thyroid nodules 6/14/2023    Rhabdomyolysis        Past Surgical History:   Procedure Laterality Date    ECHOCARDIOGRAM,TRANSESOPHAGEAL N/A 9/21/2023    Procedure: Transesophageal echo (MARIA) intra-procedure log documentation;  Surgeon: Luisana Rodríguez MD;  Location: [facility] CATH LAB;  Service: Cardiology;  Laterality: N/A;    EYE SURGERY      IRRIGATION AND DEBRIDEMENT Bilateral 12/1/2023    Procedure: IRRIGATION AND DEBRIDEMENT;  Surgeon: Jenna Gutiérrez DPM;  Location: [facility] OR;  Service: Podiatry;  Laterality: Bilateral;  Request antibiotic beads    TRANSESOPHAGEAL ECHOCARDIOGRAM WITH POSSIBLE CARDIOVERSION (MARIA W/ POSS CARDIOVERSION) N/A 1/5/2023    Procedure: Transesophageal echo (MARIA) intra-procedure log documentation;  Surgeon: Indra Foote MD;  Location: [facility] CATH LAB;  Service: Cardiology;  Laterality: N/A;    TRANSESOPHAGEAL ECHOCARDIOGRAPHY N/A 9/21/2023    Procedure: ECHOCARDIOGRAM, TRANSESOPHAGEAL;  Surgeon: Luisana Rodríguez MD;  Location: [facility] CATH LAB;  Service: Cardiology;  Laterality: N/A;    TREATMENT OF CARDIAC ARRHYTHMIA N/A 12/7/2020    Procedure: Cardioversion or Defibrillation;  Surgeon: Indra Foote MD;  Location: [facility] CATH LAB;  Service: Cardiology;  Laterality: N/A;    TREATMENT OF CARDIAC ARRHYTHMIA N/A 12/30/2020    Procedure: Cardioversion or Defibrillation;  Surgeon: Tyrone Steen MD;  Location: [facility] CATH LAB;  Service: Cardiology;  Laterality: N/A;    TREATMENT OF CARDIAC ARRHYTHMIA N/A 1/5/2023    Procedure: Cardioversion or Defibrillation;  Surgeon: Indra Foote MD;  Location: [facility] CATH LAB;  Service: Cardiology;  Laterality: N/A;    VASCULAR SURGERY         Review of patient's allergies indicates:  No Known Allergies    No  current facility-administered medications on file prior to encounter.     Current Outpatient Medications on File Prior to Encounter   Medication Sig    albuterol (PROVENTIL/VENTOLIN HFA) 90 mcg/actuation inhaler Inhale 2 puffs into the lungs every 6 (six) hours as needed for Wheezing or Shortness of Breath.    allopurinoL (ZYLOPRIM) 100 MG tablet Take 1 tablet (100 mg total) by mouth once daily.    amiodarone (PACERONE) 200 MG Tab Take 200 mg by mouth once daily.    ascorbic acid, vitamin C, (VITAMIN C) 500 MG tablet Take 500 mg by mouth 2 (two) times daily.    ELIQUIS 5 mg Tab Take 5 mg by mouth 2 (two) times daily.    ferrous sulfate (FEOSOL) 325 mg (65 mg iron) Tab tablet Take 325 mg by mouth once daily.    folic acid (FOLVITE) 1 MG tablet Take 1 mg by mouth once daily.    furosemide (LASIX) 80 MG tablet Take 1 tablet (80 mg total) by mouth 2 (two) times daily.    gabapentin (NEURONTIN) 100 MG capsule Take 1 capsule (100 mg total) by mouth 2 (two) times daily.    insulin aspart U-100 (NOVOLOG) 100 unit/mL injection Inject into the skin. Per sliding scale    metOLazone (ZAROXOLYN) 5 MG tablet Take 1 tablet (5 mg total) by mouth once daily.    metoprolol succinate (TOPROL-XL) 25 MG 24 hr tablet Take 25 mg by mouth once daily.    multivitamin (ONE DAILY MULTIVITAMIN) per tablet Take 1 tablet by mouth once daily.    potassium chloride (MICRO-K) 10 MEQ CpSR Take 10 mEq by mouth once daily.    rosuvastatin (CRESTOR) 40 MG Tab Take 40 mg by mouth every evening.    senna-docusate 8.6-50 mg (PERICOLACE) 8.6-50 mg per tablet Take 2 tablets by mouth 2 (two) times daily as needed for Constipation.    tamsulosin (FLOMAX) 0.4 mg Cap Take 1 capsule (0.4 mg total) by mouth once daily.     Family History       Problem Relation (Age of Onset)    Diabetes Mother, Father, Sister    Hypertension Mother, Father, Sister          Tobacco Use    Smoking status: Never    Smokeless tobacco: Never   Substance and Sexual Activity    Alcohol  use: Not Currently     Alcohol/week: 6.0 standard drinks of alcohol     Types: 6 Cans of beer per week    Drug use: No    Sexual activity: Yes     Partners: Female     Review of Systems   Unable to perform ROS: Mental status change     Objective:     Vital Signs (Most Recent):  Temp: 98.3 °F (36.8 °C) (12/29/23 0530)  Pulse: (!) 45 (12/29/23 0530)  Resp: 17 (12/29/23 0530)  BP: (!) 107/56 (12/29/23 0530)  SpO2: 100 % (12/29/23 0530) Vital Signs (24h Range):  Temp:  [98.1 °F (36.7 °C)-98.4 °F (36.9 °C)] 98.3 °F (36.8 °C)  Pulse:  [42-51] 45  Resp:  [17-24] 17  SpO2:  [91 %-100 %] 100 %  BP: (107-135)/(51-60) 107/56       Weight: 81 kg (178 lb 9.2 oz)  Body mass index is 24.91 kg/m².    SpO2: 100 %        Physical Exam   Physical Exam  Constitutional:       General: He is not in acute distress.     Appearance: He is ill-appearing.   HENT:      Mouth/Throat:      Mouth: Mucous membranes are dry.   Cardiovascular:      Rate and Rhythm: Bradycardia present.      Heart sounds: Murmur heard.   Pulmonary:      Effort: Pulmonary effort is normal.      Breath sounds: Normal breath sounds.   Abdominal:      General: Abdomen is flat. There is no distension.      Palpations: Abdomen is soft.      Tenderness: There is abdominal tenderness.   Musculoskeletal:      Right lower leg: No edema.      Left lower leg: No edema.   Skin:     General: Skin is warm.   Neurological:      Mental Status: He is disoriented.     Significant Labs: All pertinent lab results from the last 24 hours have been reviewed.    Significant Imaging: EKG: Complete heart block

## 2023-12-29 NOTE — ASSESSMENT & PLAN NOTE
DM:  Hemoglobin A1C   Date Value Ref Range Status   09/20/2023 5.5 4.0 - 5.6 % Final     Comment:     ADA Screening Guidelines:  5.7-6.4%  Consistent with prediabetes  >or=6.5%  Consistent with diabetes    High levels of fetal hemoglobin interfere with the HbA1C  assay. Heterozygous hemoglobin variants (HbS, HgC, etc)do  not significantly interfere with this assay.   However, presence of multiple variants may affect accuracy.

## 2023-12-29 NOTE — ASSESSMENT & PLAN NOTE
Likely multifactorial given CKD and infection. No signs of bleeding.  Has not received any PRBCs to date. Workup pending. Denies hematemesis, melena, hematochezia, other overt signs of bleeding     Current CBC reviewed-         Lab Results   Component Value Date     HGB 7.2 (L) 12/28/2023     HCT 22.9 (L) 12/28/2023      -Will obatain cnosent and transfuse if <7

## 2023-12-29 NOTE — ASSESSMENT & PLAN NOTE
Patient with complete heart block, Previous EKG with first degree AV block and LBBB. Patient on metoprolol and amiodarone  as an outpatient  Patient hemodynamically stable, he is a poor historian but denies any syncopal episodes.  EP consulted, hold of TVP for now  -Pads on patient and atropine at bedside   -Hold AV iker agents  -NPO for now until evaluation by EP

## 2023-12-29 NOTE — ASSESSMENT & PLAN NOTE
Patient's anemia is currently uncontrolled. Has not received any PRBCs to date. Workup pending. Denies hematemesis, melena, hematochezia, other overt signs of bleeding    Current CBC reviewed-   Lab Results   Component Value Date    HGB 7.2 (L) 12/28/2023    HCT 22.9 (L) 12/28/2023     Monitor serial CBC and transfuse if patient becomes hemodynamically unstable, symptomatic or H/H drops below 7/21.

## 2023-12-29 NOTE — ASSESSMENT & PLAN NOTE
On home eliquis. EKG on arrival as below    Results for orders placed or performed during the hospital encounter of 12/28/23   EKG 12-lead    Collection Time: 12/28/23  3:22 PM    Narrative    Test Reason : R00.1,    Vent. Rate : 045 BPM     Atrial Rate : 042 BPM     P-R Int : 000 ms          QRS Dur : 160 ms      QT Int : 652 ms       P-R-T Axes : 000 -21 041 degrees     QTc Int : 563 ms    Sinus rhythm  AV dissociation -  Complete heart block  Left bundle branch block  Abnormal ECG  When compared with ECG of 27-NOV-2023 15:38,  Complete heart block Now present  Confirmed by Abhijeet Pizarro MD ([id number]) on 12/28/2023 5:56:15 PM    Referred By: [email or web address]   SELF           Confirmed By:Abhijeet Pizarro MD

## 2023-12-29 NOTE — CONSULTS
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 On interview patient lethargic and disoriented and poor historian, history obtained via chart review.   In ED pt AF, bradycardic ~40's, RR in the mid 20's, BP ~110/50, satting 100% on 2L NC. Labs significant for Hb 7.2, ANC 8.3, Na 132, Cl 87, HCO3 23, AG 22, , Cr 7.5, phos 8.3, Albumin 1.9, AST 74, , Lipase 146. Patient with urinary retention and jackson placed with purulent 500ml output . EKG with complete heart block. Patient initially admitted to medicine but transferred to CCU given complete heart block    Past Medical History:   Diagnosis Date    Acute congestive heart failure 3/20/2020    Alcohol abuse     Arthritis     Asthma attack 11/24/2021    Coronary artery disease     Diabetes mellitus     Hyperlipemia     Hypertension     Multiple thyroid nodules 6/14/2023    Rhabdomyolysis        Past Surgical History:   Procedure Laterality Date    ECHOCARDIOGRAM,TRANSESOPHAGEAL N/A 9/21/2023    Procedure: Transesophageal echo (MARIA) intra-procedure log documentation;  Surgeon: Luisana Rodríguez MD;  Location: [facility] CATH LAB;  Service: Cardiology;  Laterality: N/A;    EYE SURGERY      IRRIGATION AND DEBRIDEMENT  Bilateral 12/1/2023    Procedure: IRRIGATION AND DEBRIDEMENT;  Surgeon: Jenna Gutiérrez DPM;  Location: [facility] OR;  Service: Podiatry;  Laterality: Bilateral;  Request antibiotic beads    TRANSESOPHAGEAL ECHOCARDIOGRAM WITH POSSIBLE CARDIOVERSION (MARIA W/ POSS CARDIOVERSION) N/A 1/5/2023    Procedure: Transesophageal echo (MARIA) intra-procedure log documentation;  Surgeon: Indra Foote MD;  Location: [facility] CATH LAB;  Service: Cardiology;  Laterality: N/A;    TRANSESOPHAGEAL ECHOCARDIOGRAPHY N/A 9/21/2023    Procedure: ECHOCARDIOGRAM, TRANSESOPHAGEAL;  Surgeon: Luisana Rodríguez MD;  Location: [facility] CATH LAB;  Service: Cardiology;  Laterality: N/A;    TREATMENT OF CARDIAC ARRHYTHMIA N/A 12/7/2020    Procedure: Cardioversion or Defibrillation;  Surgeon: Indra Foote MD;  Location: [facility] CATH LAB;  Service: Cardiology;  Laterality: N/A;    TREATMENT OF CARDIAC ARRHYTHMIA N/A 12/30/2020    Procedure: Cardioversion or Defibrillation;  Surgeon: Tyrone Steen MD;  Location: [facility] CATH LAB;  Service: Cardiology;  Laterality: N/A;    TREATMENT OF CARDIAC ARRHYTHMIA N/A 1/5/2023    Procedure: Cardioversion or Defibrillation;  Surgeon: Indra Foote MD;  Location: [facility] CATH LAB;  Service: Cardiology;  Laterality: N/A;    VASCULAR SURGERY         Review of patient's allergies indicates:  No Known Allergies    No current facility-administered medications on file prior to encounter.     Current Outpatient Medications on File Prior to Encounter   Medication Sig    albuterol (PROVENTIL/VENTOLIN HFA) 90 mcg/actuation inhaler Inhale 2 puffs into the lungs every 6 (six) hours as needed for Wheezing or Shortness of Breath.    allopurinoL (ZYLOPRIM) 100 MG tablet Take 1 tablet (100 mg total) by mouth once daily.    amiodarone (PACERONE) 200 MG Tab Take 200 mg by mouth once daily.    ascorbic acid, vitamin C, (VITAMIN C) 500 MG tablet Take 500 mg by mouth 2 (two) times daily.    ELIQUIS 5 mg Tab Take 5 mg by mouth 2 (two)  times daily.    ferrous sulfate (FEOSOL) 325 mg (65 mg iron) Tab tablet Take 325 mg by mouth once daily.    folic acid (FOLVITE) 1 MG tablet Take 1 mg by mouth once daily.    furosemide (LASIX) 80 MG tablet Take 1 tablet (80 mg total) by mouth 2 (two) times daily.    gabapentin (NEURONTIN) 100 MG capsule Take 1 capsule (100 mg total) by mouth 2 (two) times daily.    insulin aspart U-100 (NOVOLOG) 100 unit/mL injection Inject into the skin. Per sliding scale    metOLazone (ZAROXOLYN) 5 MG tablet Take 1 tablet (5 mg total) by mouth once daily.    metoprolol succinate (TOPROL-XL) 25 MG 24 hr tablet Take 25 mg by mouth once daily.    multivitamin (ONE DAILY MULTIVITAMIN) per tablet Take 1 tablet by mouth once daily.    potassium chloride (MICRO-K) 10 MEQ CpSR Take 10 mEq by mouth once daily.    rosuvastatin (CRESTOR) 40 MG Tab Take 40 mg by mouth every evening.    senna-docusate 8.6-50 mg (PERICOLACE) 8.6-50 mg per tablet Take 2 tablets by mouth 2 (two) times daily as needed for Constipation.    tamsulosin (FLOMAX) 0.4 mg Cap Take 1 capsule (0.4 mg total) by mouth once daily.     Family History       Problem Relation (Age of Onset)    Diabetes Mother, Father, Sister    Hypertension Mother, Father, Sister          Tobacco Use    Smoking status: Never    Smokeless tobacco: Never   Substance and Sexual Activity    Alcohol use: Not Currently     Alcohol/week: 6.0 standard drinks of alcohol     Types: 6 Cans of beer per week    Drug use: No    Sexual activity: Yes     Partners: Female     Review of Systems   Unable to perform ROS: Mental status change     Objective:     Vital Signs (Most Recent):  Temp: 98.3 °F (36.8 °C) (12/29/23 0530)  Pulse: (!) 45 (12/29/23 0530)  Resp: 17 (12/29/23 0530)  BP: (!) 107/56 (12/29/23 0530)  SpO2: 100 % (12/29/23 0530) Vital Signs (24h Range):  Temp:  [98.1 °F (36.7 °C)-98.4 °F (36.9 °C)] 98.3 °F (36.8 °C)  Pulse:  [42-51] 45  Resp:  [17-24] 17  SpO2:  [91 %-100 %] 100 %  BP:  (107-135)/(51-60) 107/56       Weight: 81 kg (178 lb 9.2 oz)  Body mass index is 24.91 kg/m².    SpO2: 100 %        Physical Exam   Physical Exam  Constitutional:       General: He is not in acute distress.     Appearance: He is ill-appearing.   HENT:      Mouth/Throat:      Mouth: Mucous membranes are dry.   Cardiovascular:      Rate and Rhythm: Bradycardia present.      Heart sounds: Murmur heard.   Pulmonary:      Effort: Pulmonary effort is normal.      Breath sounds: Normal breath sounds.   Abdominal:      General: Abdomen is flat. There is no distension.      Palpations: Abdomen is soft.      Tenderness: There is abdominal tenderness.   Musculoskeletal:      Right lower leg: No edema.      Left lower leg: No edema.   Skin:     General: Skin is warm.   Neurological:      Mental Status: He is disoriented.     Significant Labs: All pertinent lab results from the last 24 hours have been reviewed.    Significant Imaging: EKG: Complete heart block             Assessment and Plan:     * Complete heart block  Patient with complete heart block, Previous EKG with first degree AV block and LBBB. Patient on metoprolol and amiodarone  as an outpatient  Patient hemodynamically stable, he is a poor historian but denies any syncopal episodes.  -Hold TVP for now   -Pads on patient and atropine at bedside   -Hold AV iker agents    Discussed with EP fellow     Thank you for your consult. I will follow-up with patient. Please contact us if you have any additional questions.    Savannah Cohen MD  Cardiac Electrophysiology  [facility] - Emergency Dept

## 2023-12-29 NOTE — ASSESSMENT & PLAN NOTE
"Patient's FSGs are controlled on current medication regimen.  Last A1c reviewed-   Lab Results   Component Value Date    HGBA1C 5.5 09/20/2023     Most recent fingerstick glucose reviewed- No results for input(s): "POCTGLUCOSE" in the last 24 hours.  Current correctional scale  Low  Maintain anti-hyperglycemic dose as follows-   Antihyperglycemics (From admission, onward)      Start     Stop Route Frequency Ordered    12/28/23 1818  insulin aspart U-100 pen 0-5 Units         -- SubQ Before meals & nightly PRN 12/28/23 1719          Hold Oral hypoglycemics while patient is in the hospital.  "

## 2023-12-29 NOTE — PROCEDURES
PROCEDURE SUMMARY - Central Venous Catheter Placement     PROCEDURE:  Right-sided  13 [place] Triple-lumen IJ Trialysis catheter placement under ultrasound guidance    CONSENT:  The risks and benefits of this procedure were explained to the patient. All questions were answered and alternative options explained.     STAFF PHYSICIAN:  Dr. Gross    ASSISTING PHYSICIAN:  Graciela Ott    MEDICATIONS USED:  Lidocaine 1% without epinephrine, total quantity 15 mL.    PREOPERATIVE DIAGNOSIS(ES):  Acute kidney injury    POSTPROCEDURE DIAGNOSIS(ES):  Acute kidney injury    DESCRIPTION OF PROCEDURE:  Consent was obtained and placed upon the chart. Patient was positively identified and procedure site was marked. Time-out was performed by operating team and patient. Vital sign monitoring was accomplished by noninvasive hemodynamic monitoring, pulse oximetry, and telemetry.     Time-out was performed and consent was verified. The patient was placed in the supine position and the Right neck was prepped and draped in sterile fashion. The internal jugular vein was localized with the ultrasound and the skin was anesthetized with 1% lidocaine without epinephrine. A micropuncture needle was placed into the interal jugular vein with return of dark red non-pulsatile blood on the 1st attempt. A micropuncture wire was then advanced. A micropuncture dilator sheath was placed over the wire and then the wire was removed. A J-wire was advanced through the micropuncture sheath without difficulty. A small incision was made followed by dilation and a  13 [place] Triple-lumen IJ Trialysis catheter to a depth of 15cm. The catheter was sutured in place with 3-0 silk sutures x2. All 3 ports were withdrawn and flushed without difficulty. A sterile dressing was applied and procedure was terminated. Post-procedure chest x-ray is pending.       Graciela Ott  12/29/2023  5:30 PM

## 2023-12-29 NOTE — ASSESSMENT & PLAN NOTE
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 Pt disoriented, somnolent, responds intermittently to voice. Currently able to protect airway but will continue to monitor.   -Treating infection and Nephrology following for possible HD

## 2023-12-29 NOTE — CONSULTS
[facility] - Emergency Dept  Nephrology  Consult Note    Patient Name: Chato Bowie  MRN: [id number]  Admission Date: 12/28/2023  Hospital Length of Stay: 0 days  Attending Provider: Justino Miller, *   Primary Care Physician: Irlanda Valenzuela -  Principal Problem:Epigastric abdominal pain    Inpatient consult to Nephrology  Consult performed by: Gregory Gallo MD  Consult ordered by: Jose L Foley MD        Subjective:     HPI: 73 year old man with a history of longstanding T2DM, HTN, HFpEF, severe aortic stenosis, chronic bradycardia, CKD3b, newly diagnosed bilateral heel osteo on daptomycin admitted from nursing home for electrolyte abnormalities, bilateral flank pain, leg pain. Per report NH obtained labs had had a BUN of 170 for which [place] [facility] [place] In the ED the BUN of 170 was confirmed along with a creatinine of 7.5 (baseline creatinine 2-2.5 as of 2 weeks ago). Of note, K is 3.6 with CO2 of 23. A jackson was placed with 500 purulent urine out and per family over phone had been dealing with some prostate issues in the past. UA revealed a specific gravity of 1.010, pH of 6, 2+ protein (in setting of ELIZABETH), 3+ leukocytes with many bacteria and WBC clumps.     Nephrology consulted for ELIZABETH and possible dialysis needs    Past Medical History:   Diagnosis Date    Acute congestive heart failure 3/20/2020    Alcohol abuse     Arthritis     Asthma attack 11/24/2021    Coronary artery disease     Diabetes mellitus     Hyperlipemia     Hypertension     Multiple thyroid nodules 6/14/2023    Rhabdomyolysis        Past Surgical History:   Procedure Laterality Date    ECHOCARDIOGRAM,TRANSESOPHAGEAL N/A 9/21/2023    Procedure: Transesophageal echo (MARIA) intra-procedure log documentation;  Surgeon: Luisana Rodríguez MD;  Location: [facility] CATH LAB;  Service: Cardiology;  Laterality: N/A;    EYE SURGERY      IRRIGATION AND DEBRIDEMENT Bilateral 12/1/2023    Procedure: IRRIGATION AND  DEBRIDEMENT;  Surgeon: Jenna Gutiérrez DPM;  Location: [facility] OR;  Service: Podiatry;  Laterality: Bilateral;  Request antibiotic beads    TRANSESOPHAGEAL ECHOCARDIOGRAM WITH POSSIBLE CARDIOVERSION (MARIA W/ POSS CARDIOVERSION) N/A 1/5/2023    Procedure: Transesophageal echo (MARIA) intra-procedure log documentation;  Surgeon: Indra Foote MD;  Location: [facility] CATH LAB;  Service: Cardiology;  Laterality: N/A;    TRANSESOPHAGEAL ECHOCARDIOGRAPHY N/A 9/21/2023    Procedure: ECHOCARDIOGRAM, TRANSESOPHAGEAL;  Surgeon: Luisana Rodríguez MD;  Location: [facility] CATH LAB;  Service: Cardiology;  Laterality: N/A;    TREATMENT OF CARDIAC ARRHYTHMIA N/A 12/7/2020    Procedure: Cardioversion or Defibrillation;  Surgeon: Indra Foote MD;  Location: [facility] CATH LAB;  Service: Cardiology;  Laterality: N/A;    TREATMENT OF CARDIAC ARRHYTHMIA N/A 12/30/2020    Procedure: Cardioversion or Defibrillation;  Surgeon: Tyrone Steen MD;  Location: [facility] CATH LAB;  Service: Cardiology;  Laterality: N/A;    TREATMENT OF CARDIAC ARRHYTHMIA N/A 1/5/2023    Procedure: Cardioversion or Defibrillation;  Surgeon: Indra Foote MD;  Location: [facility] CATH LAB;  Service: Cardiology;  Laterality: N/A;    VASCULAR SURGERY         Review of patient's allergies indicates:  No Known Allergies  Current Facility-Administered Medications   Medication Frequency    acetaminophen tablet 650 mg Q4H PRN    [START ON 12/29/2023] allopurinoL tablet 100 mg Daily    [START ON 12/29/2023] atorvastatin tablet 80 mg Daily    ceFEPIme (MAXIPIME) 2 g in dextrose 5 % in water (D5W) 100 mL IVPB (MB+) Q12H    dextrose 10% bolus 125 mL 125 mL PRN    dextrose 10% bolus 250 mL 250 mL PRN    famotidine tablet 20 mg BID    [START ON 12/29/2023] folic acid tablet 1 mg Daily    glucagon (human recombinant) injection 1 mg PRN    glucose chewable tablet 16 g PRN    glucose chewable tablet 24 g PRN    insulin aspart U-100 pen 0-5 Units QID (AC + HS) PRN    magnesium oxide  tablet 800 mg PRN    naloxone 0.4 mg/mL injection 0.02 mg PRN    ondansetron disintegrating tablet 8 mg Q8H PRN    potassium bicarbonate disintegrating tablet 35 mEq PRN    potassium, sodium phosphates 280-160-250 mg packet 2 packet PRN    senna-docusate 8.6-50 mg per tablet 2 tablet BID PRN    sodium chloride 0.9% flush 10 mL Q12H PRN     Current Outpatient Medications   Medication    albuterol (PROVENTIL/VENTOLIN HFA) 90 mcg/actuation inhaler    allopurinoL (ZYLOPRIM) 100 MG tablet    amiodarone (PACERONE) 200 MG Tab    ascorbic acid, vitamin C, (VITAMIN C) 500 MG tablet    ELIQUIS 5 mg Tab    ferrous sulfate (FEOSOL) 325 mg (65 mg iron) Tab tablet    folic acid (FOLVITE) 1 MG tablet    furosemide (LASIX) 80 MG tablet    gabapentin (NEURONTIN) 100 MG capsule    insulin aspart U-100 (NOVOLOG) 100 unit/mL injection    metOLazone (ZAROXOLYN) 5 MG tablet    metoprolol succinate (TOPROL-XL) 25 MG 24 hr tablet    multivitamin (ONE DAILY MULTIVITAMIN) per tablet    potassium chloride (MICRO-K) 10 MEQ CpSR    rosuvastatin (CRESTOR) 40 MG Tab    senna-docusate 8.6-50 mg (PERICOLACE) 8.6-50 mg per tablet    tamsulosin (FLOMAX) 0.4 mg Cap     Family History       Problem Relation (Age of Onset)    Diabetes Mother, Father, Sister    Hypertension Mother, Father, Sister          Tobacco Use    Smoking status: Never    Smokeless tobacco: Never   Substance and Sexual Activity    Alcohol use: Not Currently     Alcohol/week: 6.0 standard drinks of alcohol     Types: 6 Cans of beer per week    Drug use: No    Sexual activity: Yes     Partners: Female     Review of Systems   Constitutional:  Positive for activity change and appetite change. Negative for diaphoresis, fatigue, fever and unexpected weight change.   HENT:  Positive for hearing loss, sore throat and trouble swallowing. Negative for congestion.    Respiratory:  Negative for cough, choking, chest tightness, shortness of breath and wheezing.    Cardiovascular:  Negative for  chest pain and leg swelling.   Gastrointestinal:  Positive for abdominal pain. Negative for abdominal distention, nausea and vomiting.   Genitourinary:  Positive for difficulty urinating and flank pain.   Neurological:  Positive for tremors and speech difficulty. Negative for dizziness, seizures and facial asymmetry.     Objective:     Vital Signs (Most Recent):  Temp: 98.2 °F (36.8 °C) (12/28/23 1926)  Pulse: (!) 43 (12/28/23 1926)  Resp: 19 (12/28/23 1926)  BP: (!) 118/58 (12/28/23 1926)  SpO2: 100 % (12/28/23 1926) Vital Signs (24h Range):  Temp:  [98.1 °F (36.7 °C)-98.2 °F (36.8 °C)] 98.2 °F (36.8 °C)  Pulse:  [42-46] 43  Resp:  [19-24] 19  SpO2:  [91 %-100 %] 100 %  BP: (108-118)/(51-58) 118/58        There is no height or weight on file to calculate BMI.  There is no height or weight on file to calculate BSA.    I/O last 3 completed shifts:  In: -   Out: 500 [Urine:500]     Physical Exam  Constitutional:       General: He is not in acute distress.     Appearance: He is normal weight. He is ill-appearing. He is not toxic-appearing.   HENT:      Head: Normocephalic and atraumatic.      Nose: Nose normal.      Mouth/Throat:      Mouth: Mucous membranes are dry.      Pharynx: No oropharyngeal exudate.   Eyes:      General: No scleral icterus.        Right eye: No discharge.         Left eye: No discharge.      Pupils: Pupils are equal, round, and reactive to light.   Cardiovascular:      Rate and Rhythm: Bradycardia present.      Heart sounds: Murmur heard.   Pulmonary:      Effort: Pulmonary effort is normal. No respiratory distress.      Breath sounds: No wheezing or rales.      Comments: Speaking in full sentences  Abdominal:      General: Abdomen is flat.      Tenderness: There is abdominal tenderness (bilateral flank).   Musculoskeletal:      Cervical back: Normal range of motion. No rigidity.      Comments: Bilateral heels wrapped in ACE bandages.    Lymphadenopathy:      Cervical: No cervical adenopathy.    Neurological:      Mental Status: He is alert.      Comments: Difficult to understand, however is following commands. Alert to name and place, knows he is from a nursing home.  +tremor, axterixis          Significant Labs:  All labs within the past 24 hours have been reviewed.    Significant Imaging:  Labs: Reviewed  Assessment/Plan:     Renal/  Acute kidney injury superimposed on CKD  Baseline creatinine 2-2.5 (longstanding DM with bilateral osteomyelitis, HTN)  Multiple comorbitites as well    On admission serum creatinine 7.5  ELIZABETH appears to be multifactorial in setting of obstructive nephropathy, probably pyelonephritis, low effective circulating volume, possible AIN in setting of longstanding ABX (though daptomycin less likely)    Obstruction relieved by jackson placed 12/28/23 with 500 cc of purulent UOP. Supposedly follows with urology in outpatient setting. Is on tamsulosin.   Pyelonephritis with UA showing 3+ leuks with many bacteria. History of proteus in past.   Low effective circulating volume suggested by physical exam, evidence of acute liver injury in setting of HFpEF with severe aortic stenosis. Home medication included metolazone 1 mg bid    Dialysis consent obtained 12/28/23    Plan/Recommendation  Follow up CT abdomen pelvis without contrast  Continue with IVF, may need more; continue with LR; hold any diuretics  Continue abx (Ceftriaxone started 12/28/23)  Continue jackson and monitor UOP  No acute needs for dialysis at this time, recommend obtaining repeat RFP at 0200 on 12/29, please page nephrology if there is acute hyperkalemia or acidosis or if mentation worsens with airway compromise concerns  -Keep MAP > 65  -Keep hemoglobin > 7  -Strict ins and outs  -Avoid nephrotoxic agents if possible and renally dose medications  -Avoid drastic hemodynamic changes if possible        UTI (urinary tract infection)  See ELIZABETH    Stage 3b chronic kidney disease (CKD)  See ELIZABETH    Endocrine  Type 2 diabetes  mellitus with kidney complication, with long-term current use of insulin  DM:  Hemoglobin A1C   Date Value Ref Range Status   09/20/2023 5.5 4.0 - 5.6 % Final     Comment:     ADA Screening Guidelines:  5.7-6.4%  Consistent with prediabetes  >or=6.5%  Consistent with diabetes    High levels of fetal hemoglobin interfere with the HbA1C  assay. Heterozygous hemoglobin variants (HbS, HgC, etc)do  not significantly interfere with this assay.   However, presence of multiple variants may affect accuracy.               Thank you for your consult. I will follow-up with patient. Please contact us if you have any additional questions.    Gregory Gallo MD  Nephrology  [facility] - Emergency Dept

## 2023-12-29 NOTE — ASSESSMENT & PLAN NOTE
Admitted on 11/27 from NH for b/l wounds. 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 Wound swab of R ulcer with MRSA.   s/p bone biopsy and debridement by podiatry   On 6 weeks of IV abx Dapto

## 2023-12-29 NOTE — ASSESSMENT & PLAN NOTE
Chronic, controlled. Latest blood pressure and vitals reviewed-     Temp:  [98.1 °F (36.7 °C)]   Pulse:  [43-46]   Resp:  [21-24]   BP: (108-111)/(51-54)   SpO2:  [91 %-100 %] .   Home meds for hypertension were reviewed and noted below.   Hypertension Medications               furosemide (LASIX) 80 MG tablet Take 1 tablet (80 mg total) by mouth 2 (two) times daily.    metOLazone (ZAROXOLYN) 5 MG tablet Take 1 tablet (5 mg total) by mouth once daily.    metoprolol succinate (TOPROL-XL) 25 MG 24 hr tablet Take 25 mg by mouth once daily.            While in the hospital, will manage blood pressure as follows; Adjust home antihypertensive regimen as follows- holding BP meds in setting of hypotension    Will utilize p.r.n. blood pressure medication only if patient's blood pressure greater than 180/110 and he develops symptoms such as worsening chest pain or shortness of breath.

## 2023-12-29 NOTE — ASSESSMENT & PLAN NOTE
Patient with acute kidney injury/acute renal failure likely due to pre-renal azotemia due to dehydration ELIZABETH is currently  trending, Cr on admission 7.5 from 2.5 two weeks ago . Baseline creatinine  ~2.1  - Labs reviewed- Renal function/electrolytes with CrCl cannot be calculated (Unknown ideal weight.).  -Monitor urine output and serial BMP and adjust therapy as needed.   -Avoid nephrotoxins and renally dose meds for GFR listed above.  -Nephrology consulted for recs and HD in the setting of likely uremic encephalopathy  - No emergent dialysis overnight, holding diuretics. Patient received fluids in the ED but given history of severe AS will revaluate prior to more fluids

## 2023-12-29 NOTE — ASSESSMENT & PLAN NOTE
Baseline creatinine 2-2.5 (longstanding DM with bilateral osteomyelitis, HTN)  Multiple comorbitites as well    On admission serum creatinine 7.5  ELIZABETH appears to be multifactorial in setting of obstructive nephropathy, probably pyelonephritis, low effective circulating volume, possible AIN in setting of longstanding ABX (though daptomycin less likely)    Obstruction relieved by jackson placed 12/28/23 with 500 cc of purulent UOP. Supposedly follows with urology in outpatient setting. Is on tamsulosin.   Pyelonephritis with UA showing 3+ leuks with many bacteria. History of proteus in past.   Low effective circulating volume suggested by physical exam, evidence of acute liver injury in setting of HFpEF with severe aortic stenosis. Home medication included metolazone 1 mg bid    Dialysis consent obtained 12/28/23    Plan/Recommendation  Follow up CT abdomen pelvis without contrast  Continue with IVF, may need more; continue with LR; hold any diuretics  Continue abx (Ceftriaxone started 12/28/23)  Continue jackson and monitor UOP  No acute needs for dialysis at this time, recommend obtaining repeat RFP at 0200 on 12/29, please page nephrology if there is acute hyperkalemia or acidosis or if mentation worsens with airway compromise concerns  -Keep MAP > 65  -Keep hemoglobin > 7  -Strict ins and outs  -Avoid nephrotoxic agents if possible and renally dose medications  -Avoid drastic hemodynamic changes if possible

## 2023-12-29 NOTE — ASSESSMENT & PLAN NOTE
Patient with acute kidney injury/acute renal failure likely due to pre-renal azotemia due to dehydration ELIZABETH is currently  trending, Cr on admission 7.5 from 2.5 two weeks ago . Baseline creatinine  ~2.1  - Labs reviewed- Renal function/electrolytes with CrCl cannot be calculated (Unknown ideal weight.). according to latest data. Monitor urine output and serial BMP and adjust therapy as needed. Avoid nephrotoxins and renally dose meds for GFR listed above.    Nephrology consulted for recs and HD in the setting of likely uremic encephalopathy

## 2023-12-29 NOTE — ASSESSMENT & PLAN NOTE
Baseline creatinine 2-2.5 (longstanding DM with bilateral osteomyelitis, HTN)  Multiple comorbitites as well    On admission serum creatinine 7.5  ELIZABETH appears to be multifactorial in setting of obstructive nephropathy, probably pyelonephritis, low effective circulating volume, possible AIN in setting of longstanding ABX (though daptomycin less likely)    Obstruction relieved by jackson placed 12/28/23 with 500 cc of purulent UOP. Supposedly follows with urology in outpatient setting. Is on tamsulosin.   Pyelonephritis with UA showing 3+ leuks with many bacteria. History of proteus in past.   Low effective circulating volume suggested by physical exam, evidence of acute liver injury in setting of HFpEF with severe aortic stenosis. Home medication included metolazone 1 mg bid    Dialysis consent obtained 12/28/23    Plan/Recommendation  Will plan for short 2 hr intermittent HD session today for clearance ONLY. Labs adjusted per protocol.   Will need additional clearance Saturday (12/30) and likely Sunday / Monday for ongoing clearance and volume management.   Continue abx (Ceftriaxone started 12/28/23)  Continue jackson and monitor UOP  -Strict ins and outs  -Avoid nephrotoxic agents if possible and renally dose medications  -Avoid drastic hemodynamic changes if possible

## 2023-12-29 NOTE — H&P
[facility] - Emergency Dept  [facility] Medicine  History & Physical    Patient Name: Chato Bowie  MRN: [id number]  Patient Class: IP- Inpatient  Admission Date: 12/28/2023  Attending Physician: Justino Miller, *   Primary Care Provider: Irlanda Valenzuela -         Patient information was obtained from ER records.     Subjective:     Principal Problem:Epigastric abdominal pain    Chief Complaint:   Chief Complaint   Patient presents with    Multiple complaints      Arrives via EMS with c/o elevated kidney fx, fatigue, and bradycardia -30s coming from [place]          HPI: 73M hx T2DM, HTN, HLD, CHF, CAD, HLD, CKD 3b, alcohol use disorder, and multiple thyroid nodules presented from [facility] with RLQ pain, fatigue, bradycardia ~30's, weakness, and lab abnormalities including Cr 7.1 and . 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  On interview patient lethargic and disoriented, history obtained via chart review.    In ED pt AF, bradycardic ~40's, RR in the mid 20's, BP ~110/50, satting 100% on 2L NC. Labs significant for Hb 7.2, ANC 8.3, Na 132, Cl 87, HCO3 23, AG 22, , Cr 7.5, phos 8.3, Albumin 1.9, AST 74, , Lipase 146. UA with 2+ protein, 2+ occult blood, 3+ leukocytes, >100WBC and many bacteria. EKG showed wide QRS with LBBB, vent rate 45, QT/QTc 652/563. CXR showed mild retraction of R-sided PICC into mid SVC. CT A&P in progress. In ED jackson placed, started on CTX, given fluids, nephrology consulted. Admitting to [facility] for further management.     Past Medical History:   Diagnosis Date    Acute congestive heart failure 3/20/2020    Alcohol abuse     Arthritis     Asthma attack 11/24/2021    Coronary artery disease     Diabetes mellitus     Hyperlipemia     Hypertension     Multiple thyroid nodules 6/14/2023    Rhabdomyolysis         Past Surgical History:   Procedure Laterality Date    ECHOCARDIOGRAM,TRANSESOPHAGEAL N/A 9/21/2023    Procedure: Transesophageal echo (MARIA) intra-procedure log documentation;  Surgeon: Luisana Rodríguez MD;  Location: [facility] CATH LAB;  Service: Cardiology;  Laterality: N/A;    EYE SURGERY      IRRIGATION AND DEBRIDEMENT Bilateral 12/1/2023    Procedure: IRRIGATION AND DEBRIDEMENT;  Surgeon: Jenna Gutiérrez DPM;  Location: [facility] OR;  Service: Podiatry;  Laterality: Bilateral;  Request antibiotic beads    TRANSESOPHAGEAL ECHOCARDIOGRAM WITH POSSIBLE CARDIOVERSION (MARIA W/ POSS CARDIOVERSION) N/A 1/5/2023    Procedure: Transesophageal echo (MARIA) intra-procedure log documentation;  Surgeon: Indra Foote MD;  Location: [facility] CATH LAB;  Service: Cardiology;  Laterality: N/A;    TRANSESOPHAGEAL ECHOCARDIOGRAPHY N/A 9/21/2023    Procedure: ECHOCARDIOGRAM, TRANSESOPHAGEAL;  Surgeon: Luisana Rodríguez MD;  Location: [facility] CATH LAB;  Service: Cardiology;  Laterality: N/A;    TREATMENT OF CARDIAC ARRHYTHMIA N/A 12/7/2020    Procedure: Cardioversion or Defibrillation;  Surgeon: Indra Foote MD;  Location: [facility] CATH LAB;  Service: Cardiology;  Laterality: N/A;    TREATMENT OF CARDIAC ARRHYTHMIA N/A 12/30/2020    Procedure: Cardioversion or Defibrillation;  Surgeon: Tyrone Steen MD;  Location: [facility] CATH LAB;  Service: Cardiology;  Laterality: N/A;    TREATMENT OF CARDIAC ARRHYTHMIA N/A 1/5/2023    Procedure: Cardioversion or Defibrillation;  Surgeon: Indra Foote MD;  Location: [facility] CATH LAB;  Service: Cardiology;  Laterality: N/A;    VASCULAR SURGERY         Review of patient's allergies indicates:  No Known Allergies    No current facility-administered medications on file prior to encounter.     Current Outpatient Medications on File Prior to Encounter   Medication Sig    albuterol (PROVENTIL/VENTOLIN HFA) 90 mcg/actuation inhaler Inhale 2 puffs into the lungs every 6 (six) hours as needed for Wheezing  or Shortness of Breath.    allopurinoL (ZYLOPRIM) 100 MG tablet Take 1 tablet (100 mg total) by mouth once daily.    amiodarone (PACERONE) 200 MG Tab Take 200 mg by mouth once daily.    ascorbic acid, vitamin C, (VITAMIN C) 500 MG tablet Take 500 mg by mouth 2 (two) times daily.    ELIQUIS 5 mg Tab Take 5 mg by mouth 2 (two) times daily.    ferrous sulfate (FEOSOL) 325 mg (65 mg iron) Tab tablet Take 325 mg by mouth once daily.    folic acid (FOLVITE) 1 MG tablet Take 1 mg by mouth once daily.    furosemide (LASIX) 80 MG tablet Take 1 tablet (80 mg total) by mouth 2 (two) times daily.    gabapentin (NEURONTIN) 100 MG capsule Take 1 capsule (100 mg total) by mouth 2 (two) times daily.    insulin aspart U-100 (NOVOLOG) 100 unit/mL injection Inject into the skin. Per sliding scale    metOLazone (ZAROXOLYN) 5 MG tablet Take 1 tablet (5 mg total) by mouth once daily.    metoprolol succinate (TOPROL-XL) 25 MG 24 hr tablet Take 25 mg by mouth once daily.    multivitamin (ONE DAILY MULTIVITAMIN) per tablet Take 1 tablet by mouth once daily.    potassium chloride (MICRO-K) 10 MEQ CpSR Take 10 mEq by mouth once daily.    rosuvastatin (CRESTOR) 40 MG Tab Take 40 mg by mouth every evening.    senna-docusate 8.6-50 mg (PERICOLACE) 8.6-50 mg per tablet Take 2 tablets by mouth 2 (two) times daily as needed for Constipation.    tamsulosin (FLOMAX) 0.4 mg Cap Take 1 capsule (0.4 mg total) by mouth once daily.     Family History       Problem Relation (Age of Onset)    Diabetes Mother, Father, Sister    Hypertension Mother, Father, Sister          Tobacco Use    Smoking status: Never    Smokeless tobacco: Never   Substance and Sexual Activity    Alcohol use: Not Currently     Alcohol/week: 6.0 standard drinks of alcohol     Types: 6 Cans of beer per week    Drug use: No    Sexual activity: Yes     Partners: Female       Objective:     Vital Signs (Most Recent):  Temp: 98.1 °F (36.7 °C) (12/28/23 1453)  Pulse: (!) 46 (12/28/23  1632)  Resp: (!) 21 (12/28/23 1631)  BP: (!) 111/51 (12/28/23 1632)  SpO2: 100 % (12/28/23 1632) Vital Signs (24h Range):  Temp:  [98.1 °F (36.7 °C)] 98.1 °F (36.7 °C)  Pulse:  [43-46] 46  Resp:  [21-24] 21  SpO2:  [91 %-100 %] 100 %  BP: (108-111)/(51-54) 111/51        There is no height or weight on file to calculate BMI.     Physical Exam  Constitutional:       General: He is in acute distress.      Appearance: He is ill-appearing.   HENT:      Head: Normocephalic and atraumatic.      Right Ear: External ear normal.      Left Ear: External ear normal.      Nose: Nose normal.      Mouth/Throat:      Mouth: Mucous membranes are dry.   Eyes:      Extraocular Movements: Extraocular movements intact.      Pupils: Pupils are equal, round, and reactive to light.   Cardiovascular:      Rate and Rhythm: Regular rhythm. Bradycardia present.      Heart sounds: Murmur heard.   Pulmonary:      Effort: No respiratory distress.      Breath sounds: No wheezing or rales.   Abdominal:      General: Abdomen is flat.      Palpations: Abdomen is soft.      Tenderness: There is abdominal tenderness.      Comments: Epigastric tenderness to palpation, colicky RLQ pain   Musculoskeletal:      Cervical back: Normal range of motion.      Right lower leg: Edema present.      Left lower leg: Edema present.   Skin:     General: Skin is warm and dry.   Neurological:      Mental Status: He is disoriented.      Motor: Weakness present.              CRANIAL NERVES     CN III, IV, VI   Pupils are equal, round, and reactive to light.       Significant Labs: All pertinent labs within the past 24 hours have been reviewed.    Significant Imaging: I have reviewed all pertinent imaging results/findings within the past 24 hours.  Assessment/Plan:     Acute kidney injury superimposed on CKD  Patient with acute kidney injury/acute renal failure likely due to pre-renal azotemia due to dehydration ELIZABETH is currently  trending, Cr on admission 7.5 from 2.5 two  weeks ago . Baseline creatinine  ~2.1  - Labs reviewed- Renal function/electrolytes with CrCl cannot be calculated (Unknown ideal weight.). according to latest data. Monitor urine output and serial BMP and adjust therapy as needed. Avoid nephrotoxins and renally dose meds for GFR listed above.    Nephrology consulted for recs and HD in the setting of likely uremic encephalopathy    Acute encephalopathy  Likely uremic encephalopathy in the setting of ARF with  and asterixis on exam. Pt disoriented, somnolent, responds intermittently to voice. Currently able to protect airway but will continue to monitor. Nephrology consulted for HD. Treating UTI with abx.      Multiple thyroid nodules  TSH stable      Bradycardia  HR 30's reported at NH prior to admission. On admit HR mid 40's, see EKG results below. Concern for complete heart block on tele, will reach out to cardiology for opinion     Results for orders placed or performed during the hospital encounter of 12/28/23   EKG 12-lead    Collection Time: 12/28/23  3:22 PM    Narrative    Test Reason : R00.1,    Vent. Rate : 045 BPM     Atrial Rate : 042 BPM     P-R Int : 000 ms          QRS Dur : 160 ms      QT Int : 652 ms       P-R-T Axes : 000 -21 041 degrees     QTc Int : 563 ms    Sinus rhythm  AV dissociation -  Complete heart block  Left bundle branch block  Abnormal ECG  When compared with ECG of 27-NOV-2023 15:38,  Complete heart block Now present  Confirmed by Abhijeet Pizarro MD ([id number]) on 12/28/2023 5:56:15 PM    Referred By: [email or web address]   SELF           Confirmed By:Abhijeet Pizarro MD          Severe malnutrition  Nutrition consulted. Most recent weight and BMI pending    Measurements:  Wt Readings from Last 1 Encounters:   12/07/23 81 kg (178 lb 9.2 oz)   There is no height or weight on file to calculate BMI.    Patient will been screened and assessed by RD.    Malnutrition Type:  Context:    Level:      Malnutrition Characteristic Summary:        Interventions/Recommendations (treatment strategy):         UTI (urinary tract infection)  Acute Cystitis without Hematuria    This 73 y.o. @gender@ presented with the following signs & symptoms of a UTI: dysuria, foul smelling urine, hesitancy, and inability to void.  They do have signs/sxs that suggest infection extends beyond the bladder,   incl flank pain, CVA tenderness.      Suspect this patient has Complicated cystitis.    PLAN:  - F/u urine gram stain and culture  - Ceftriaxone 2g IV q24h starting tonight, received CTX in ED  - If pt is/becomes febrile then draw 2 blood culture tubes, each from different site  - Initiate Sepsis protocols if the patient presented to [facility] with 2+ SIRS, or has an acute change in SOFA score of 2 or more        Paroxysmal atrial flutter  On home eliquis. EKG on arrival as below    Results for orders placed or performed during the hospital encounter of 12/28/23   EKG 12-lead    Collection Time: 12/28/23  3:22 PM    Narrative    Test Reason : R00.1,    Vent. Rate : 045 BPM     Atrial Rate : 042 BPM     P-R Int : 000 ms          QRS Dur : 160 ms      QT Int : 652 ms       P-R-T Axes : 000 -21 041 degrees     QTc Int : 563 ms    Sinus rhythm  AV dissociation -  Complete heart block  Left bundle branch block  Abnormal ECG  When compared with ECG of 27-NOV-2023 15:38,  Complete heart block Now present  Confirmed by Abhijeet Pizarro MD ([id number]) on 12/28/2023 5:56:15 PM    Referred By: [email or web address]   SELF           Confirmed By:Abhijeet Pizarro MD          Severe aortic valve stenosis        Stage 3b chronic kidney disease (CKD)  Creatine stable for now. BMP reviewed- noted CrCl cannot be calculated (Unknown ideal weight.). according to latest data. Based on current GFR, CKD stage is stage 5 - GFR < 15.  Monitor UOP and serial BMP and adjust therapy as needed. Renally dose meds. Avoid nephrotoxic medications and procedures. See ARF    GERD (gastroesophageal reflux disease)  Cont  "PPI      Anemia  Patient's anemia is currently uncontrolled. Has not received any PRBCs to date. Workup pending. Denies hematemesis, melena, hematochezia, other overt signs of bleeding    Current CBC reviewed-   Lab Results   Component Value Date    HGB 7.2 (L) 12/28/2023    HCT 22.9 (L) 12/28/2023     Monitor serial CBC and transfuse if patient becomes hemodynamically unstable, symptomatic or H/H drops below 7/21.    Type 2 diabetes mellitus with kidney complication, with long-term current use of insulin  Patient's FSGs are controlled on current medication regimen.  Last A1c reviewed-   Lab Results   Component Value Date    HGBA1C 5.5 09/20/2023     Most recent fingerstick glucose reviewed- No results for input(s): "POCTGLUCOSE" in the last 24 hours.  Current correctional scale  Low  Maintain anti-hyperglycemic dose as follows-   Antihyperglycemics (From admission, onward)      Start     Stop Route Frequency Ordered    12/28/23 1818  insulin aspart U-100 pen 0-5 Units         -- SubQ Before meals & nightly PRN 12/28/23 1719          Hold Oral hypoglycemics while patient is in the hospital.    Dyslipidemia  Cont home statin      Essential hypertension  Chronic, controlled. Latest blood pressure and vitals reviewed-     Temp:  [98.1 °F (36.7 °C)]   Pulse:  [43-46]   Resp:  [21-24]   BP: (108-111)/(51-54)   SpO2:  [91 %-100 %] .   Home meds for hypertension were reviewed and noted below.   Hypertension Medications               furosemide (LASIX) 80 MG tablet Take 1 tablet (80 mg total) by mouth 2 (two) times daily.    metOLazone (ZAROXOLYN) 5 MG tablet Take 1 tablet (5 mg total) by mouth once daily.    metoprolol succinate (TOPROL-XL) 25 MG 24 hr tablet Take 25 mg by mouth once daily.            While in the hospital, will manage blood pressure as follows; Adjust home antihypertensive regimen as follows- holding BP meds in setting of hypotension    Will utilize p.r.n. blood pressure medication only if patient's blood " pressure greater than 180/110 and he develops symptoms such as worsening chest pain or shortness of breath.    Epigastric abdominal pain  Endorses ~2wks of persistent epigastric tenderness with intermittent colicky RLQ pain. Endorses decreased appetite. No NVD/constipation. CT A&P pending        VTE Risk Mitigation (From admission, onward)           Ordered     IP VTE HIGH RISK PATIENT  Once         12/28/23 1719     Place sequential compression device  Until discontinued         12/28/23 1719                                    Jed Barbosa MD  Department of Hospital Medicine  [facility] - Emergency Dept

## 2023-12-29 NOTE — ED NOTES
Telemetry Verification   Patient placed on Telemetry Box  Verified with War Room  Tech War room   Box #    Rate 43   Rhythm Sinus Wiley

## 2023-12-29 NOTE — H&P
[facility] - Emergency Dept  Cardiology  History and Physical     Patient Name: Chato Bowie  MRN: [id number]  Admission Date: 12/28/2023  Code Status: Full Code   Attending Provider: Frantz Gross MD   Primary Care Physician: Irlanda Valenzuela -  Principal Problem:Complete heart block    Patient information was obtained from patient and primary team.     Subjective:     Chief Complaint:  chb     HPI:  73 year old male with DM2, HTN, CAD, HFpEF, HLD, CKD3b, and hx of alcohol use disorder with recent admission for MRSA osteomyelitis of BL heel ulcers on IV dapto end date 1/10/24 who presented from [facility] with RLQ pain, fatigue, bradycardia ~30's, weakness, and lab abnormalities including Cr 7.1 and . On interview patient lethargic and disoriented and poor historian, history obtained via chart review.   In ED pt AF, bradycardic ~40's, RR in the mid 20's, BP ~110/50, satting 100% on 2L NC. Labs significant for Hb 7.2, ANC 8.3, Na 132, Cl 87, HCO3 23, AG 22, , Cr 7.5, phos 8.3, Albumin 1.9, AST 74, , Lipase 146. Patient with urinary retention and jackson placed with purulent 500ml output . EKG with complete heart block. Patient initially admitted to medicine but transferred to CCU given Heart block    Past Medical History:   Diagnosis Date    Acute congestive heart failure 3/20/2020    Alcohol abuse     Arthritis     Asthma attack 11/24/2021    Coronary artery disease     Diabetes mellitus     Hyperlipemia     Hypertension     Multiple thyroid nodules 6/14/2023    Rhabdomyolysis        Past Surgical History:   Procedure Laterality Date    ECHOCARDIOGRAM,TRANSESOPHAGEAL N/A 9/21/2023    Procedure: Transesophageal echo (MARIA) intra-procedure log documentation;  Surgeon: Luisana Rodríguez MD;  Location: [facility] CATH LAB;  Service: Cardiology;  Laterality: N/A;    EYE SURGERY      IRRIGATION AND DEBRIDEMENT Bilateral 12/1/2023    Procedure: IRRIGATION AND DEBRIDEMENT;  Surgeon: Brock  Jenna LPEE DPM;  Location: [facility] OR;  Service: Podiatry;  Laterality: Bilateral;  Request antibiotic beads    TRANSESOPHAGEAL ECHOCARDIOGRAM WITH POSSIBLE CARDIOVERSION (MARIA W/ POSS CARDIOVERSION) N/A 1/5/2023    Procedure: Transesophageal echo (MARIA) intra-procedure log documentation;  Surgeon: Indra Foote MD;  Location: [facility] CATH LAB;  Service: Cardiology;  Laterality: N/A;    TRANSESOPHAGEAL ECHOCARDIOGRAPHY N/A 9/21/2023    Procedure: ECHOCARDIOGRAM, TRANSESOPHAGEAL;  Surgeon: Luisana Rodríguez MD;  Location: [facility] CATH LAB;  Service: Cardiology;  Laterality: N/A;    TREATMENT OF CARDIAC ARRHYTHMIA N/A 12/7/2020    Procedure: Cardioversion or Defibrillation;  Surgeon: Indra Foote MD;  Location: [facility] CATH LAB;  Service: Cardiology;  Laterality: N/A;    TREATMENT OF CARDIAC ARRHYTHMIA N/A 12/30/2020    Procedure: Cardioversion or Defibrillation;  Surgeon: Tyrone Steen MD;  Location: [facility] CATH LAB;  Service: Cardiology;  Laterality: N/A;    TREATMENT OF CARDIAC ARRHYTHMIA N/A 1/5/2023    Procedure: Cardioversion or Defibrillation;  Surgeon: Indra Foote MD;  Location: [facility] CATH LAB;  Service: Cardiology;  Laterality: N/A;    VASCULAR SURGERY         Review of patient's allergies indicates:  No Known Allergies    No current facility-administered medications on file prior to encounter.     Current Outpatient Medications on File Prior to Encounter   Medication Sig    albuterol (PROVENTIL/VENTOLIN HFA) 90 mcg/actuation inhaler Inhale 2 puffs into the lungs every 6 (six) hours as needed for Wheezing or Shortness of Breath.    allopurinoL (ZYLOPRIM) 100 MG tablet Take 1 tablet (100 mg total) by mouth once daily.    amiodarone (PACERONE) 200 MG Tab Take 200 mg by mouth once daily.    ascorbic acid, vitamin C, (VITAMIN C) 500 MG tablet Take 500 mg by mouth 2 (two) times daily.    ELIQUIS 5 mg Tab Take 5 mg by mouth 2 (two) times daily.    ferrous sulfate (FEOSOL) 325 mg (65 mg iron) Tab tablet Take  325 mg by mouth once daily.    folic acid (FOLVITE) 1 MG tablet Take 1 mg by mouth once daily.    furosemide (LASIX) 80 MG tablet Take 1 tablet (80 mg total) by mouth 2 (two) times daily.    gabapentin (NEURONTIN) 100 MG capsule Take 1 capsule (100 mg total) by mouth 2 (two) times daily.    insulin aspart U-100 (NOVOLOG) 100 unit/mL injection Inject into the skin. Per sliding scale    metOLazone (ZAROXOLYN) 5 MG tablet Take 1 tablet (5 mg total) by mouth once daily.    metoprolol succinate (TOPROL-XL) 25 MG 24 hr tablet Take 25 mg by mouth once daily.    multivitamin (ONE DAILY MULTIVITAMIN) per tablet Take 1 tablet by mouth once daily.    potassium chloride (MICRO-K) 10 MEQ CpSR Take 10 mEq by mouth once daily.    rosuvastatin (CRESTOR) 40 MG Tab Take 40 mg by mouth every evening.    senna-docusate 8.6-50 mg (PERICOLACE) 8.6-50 mg per tablet Take 2 tablets by mouth 2 (two) times daily as needed for Constipation.    tamsulosin (FLOMAX) 0.4 mg Cap Take 1 capsule (0.4 mg total) by mouth once daily.     Family History       Problem Relation (Age of Onset)    Diabetes Mother, Father, Sister    Hypertension Mother, Father, Sister          Tobacco Use    Smoking status: Never    Smokeless tobacco: Never   Substance and Sexual Activity    Alcohol use: Not Currently     Alcohol/week: 6.0 standard drinks of alcohol     Types: 6 Cans of beer per week    Drug use: No    Sexual activity: Yes     Partners: Female     Review of Systems   Unable to perform ROS: Other   Constitutional: Positive for night sweats.   Poor historian     Objective:     Vital Signs (Most Recent):  Temp: 98.4 °F (36.9 °C) (12/29/23 0430)  Pulse: (!) 46 (12/29/23 0430)  Resp: 17 (12/29/23 0430)  BP: (!) 119/58 (12/29/23 0430)  SpO2: 100 % (12/29/23 0430) Vital Signs (24h Range):  Temp:  [98.1 °F (36.7 °C)-98.4 °F (36.9 °C)] 98.4 °F (36.9 °C)  Pulse:  [42-51] 46  Resp:  [17-24] 17  SpO2:  [91 %-100 %] 100 %  BP: (108-135)/(51-60) 119/58     Weight: 81 kg  (178 lb 9.2 oz)  Body mass index is 24.91 kg/m².    SpO2: 100 %         Intake/Output Summary (Last 24 hours) at 2023 0550  Last data filed at 2023 1841  Gross per 24 hour   Intake --   Output 500 ml   Net -500 ml       Lines/Drains/Airways       Peripherally Inserted Central Catheter Line  Duration             PICC Double Lumen 23 1457 right brachial 21 days              Drain  Duration                  Urethral Catheter 23 1600 Double-lumen 16 Fr. <1 day                     Physical Exam  Constitutional:       General: He is not in acute distress.     Appearance: He is ill-appearing.   HENT:      Mouth/Throat:      Mouth: Mucous membranes are dry.   Cardiovascular:      Rate and Rhythm: Bradycardia present.      Heart sounds: Murmur heard.   Pulmonary:      Effort: Pulmonary effort is normal.      Breath sounds: Normal breath sounds.   Abdominal:      General: Abdomen is flat. There is no distension.      Palpations: Abdomen is soft.      Tenderness: There is abdominal tenderness.   Musculoskeletal:      Right lower leg: No edema.      Left lower leg: No edema.   Skin:     General: Skin is warm.   Neurological:      Mental Status: He is disoriented.          Significant Labs: CMP   Recent Labs   Lab 23  1531 23  0446   * 133*   K 3.6 3.4*   CL 87* 90*   CO2 23 24   * 109   *  --    CREATININE 7.5* 7.7*   CALCIUM 8.5* 8.3*   PROT 6.6 6.4   ALBUMIN 1.9* 2.0*   BILITOT 0.6 0.5   ALKPHOS 91 88   AST 74* 63*   * 91*   ANIONGAP 22* 19*   , CBC   Recent Labs   Lab 23  1531 23  0446   WBC 10.05 10.59   HGB 7.2* 6.4*   HCT 22.9* 20.7*    387   , and All pertinent lab results from the last 24 hours have been reviewed.    Significant Imagin/15/2023  The left ventricle is normal in size with concentric hypertrophy and normal systolic function.  The estimated ejection fraction is 60%.  Grade II left ventricular diastolic  dysfunction.  Normal right ventricular size with normal right ventricular systolic function.  Moderate left atrial enlargement.  There is moderate aortic valve stenosis.  Aortic valve area is 1.43 cm2; peak velocity is 3.94 m/s; mean gradient is 31 mmHg.  Mild-to-moderate aortic regurgitation.  Mild mitral regurgitation.  Normal central venous pressure (3 mmHg).  The estimated PA systolic pressure is 39 mmHg.    MARIA 9/21/2023        Left Ventricle: Normal wall motion. There is normal systolic function with a visually estimated ejection fraction of 60 - 65%.    Left Atrium: Left atrium is dilated.No significant mass identified in left atrium    Right Ventricle: Right ventricular enlargement. Systolic function is reduced.    Right Atrium: Right atrium is dilated.    Aortic Valve: The aortic valve is a trileaflet valve. There is severe stenosis. There is moderate aortic regurgitation.    Mitral Valve: There is moderate regurgitation.    Tricuspid Valve: There is mild regurgitation.       Assessment and Plan:     * Complete heart block  Patient with complete heart block, Previous EKG with first degree AV block and LBBB. Patient on metoprolol and amiodarone  as an outpatient  Patient hemodynamically stable, he is a poor historian but denies any syncopal episodes.  EP consulted, hold of TVP for now  -Pads on patient and atropine at bedside   -Hold AV iker agents  -NPO for now until evaluation by EP     Acute kidney injury superimposed on CKD  Patient with acute kidney injury/acute renal failure likely due to pre-renal azotemia due to dehydration ELIZABETH is currently  trending, Cr on admission 7.5 from 2.5 two weeks ago . Baseline creatinine  ~2.1  - Labs reviewed- Renal function/electrolytes with CrCl cannot be calculated (Unknown ideal weight.).  -Monitor urine output and serial BMP and adjust therapy as needed.   -Avoid nephrotoxins and renally dose meds for GFR listed above.  -Nephrology consulted for recs and HD in the  setting of likely uremic encephalopathy  - No emergent dialysis overnight, holding diuretics. Patient received fluids in the ED but given history of severe AS will revaluate prior to more fluids     Osteomyelitis of foot  Admitted on 11/27 from NH for b/l wounds. 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 Wound swab of R ulcer with MRSA.   s/p bone biopsy and debridement by podiatry   On 6 weeks of IV abx Dapto       Acute encephalopathy  Likely uremic encephalopathy in the setting of ARF with  and asterixis on exam. Pt disoriented, somnolent, responds intermittently to voice. Currently able to protect airway but will continue to monitor.   -Treating infection and Nephrology following for possible HD     UTI (urinary tract infection)  Suspect this patient has Complicated cystitis given retention and positive UA  - F/u urine gram stain and culture  - Cefepime 2g IV q24h starting tonight, received CTX in ED  - If pt is/becomes febrile then draw 2 blood culture tubes, each from different site         Paroxysmal atrial flutter  On Eliquis at home. Unknown last dose  -Will hold off restating due to possible PPM    Severe aortic valve stenosis  TTE ordered for further assessment     Anemia  Likely multifactorial given CKD and infection. No signs of bleeding.  Has not received any PRBCs to date. Workup pending. Denies hematemesis, melena, hematochezia, other overt signs of bleeding     Current CBC reviewed-         Lab Results   Component Value Date     HGB 7.2 (L) 12/28/2023     HCT 22.9 (L) 12/28/2023      -Will obatain cnosent and transfuse if <7    Dyslipidemia  Continue statin    Epigastric abdominal pain  CTAP odered in the ED        VTE Risk Mitigation (From admission, onward)           Ordered     IP VTE HIGH RISK PATIENT  Once         12/28/23 1719     Place sequential compression device  Until discontinued         12/28/23 1719                    Savannah Cohen MD  Cardiology   [facility] -  Emergency Dept

## 2023-12-29 NOTE — ASSESSMENT & PLAN NOTE
Nutrition consulted. Most recent weight and BMI pending    Measurements:  Wt Readings from Last 1 Encounters:   12/07/23 81 kg (178 lb 9.2 oz)   There is no height or weight on file to calculate BMI.    Patient will been screened and assessed by RD.    Malnutrition Type:  Context:    Level:      Malnutrition Characteristic Summary:       Interventions/Recommendations (treatment strategy):

## 2023-12-29 NOTE — SUBJECTIVE & OBJECTIVE
Interval History:   Renal function continues to worsen despite IVFs (sp 2.5 L of NS yesterday). sCr up to 7.7 from 7.5. . Other electrolytes stable. Mentation poor on exam. 700 mL of UOP charted.   HD consent obtained.   Cardiology following.     Review of patient's allergies indicates:  No Known Allergies  Current Facility-Administered Medications   Medication Frequency    0.9%  NaCl infusion (for blood administration) Q24H PRN    0.9%  NaCl infusion Once    acetaminophen tablet 650 mg Q4H PRN    [START ON 12/30/2023] allopurinol split tablet 50 mg Daily    atorvastatin tablet 80 mg Daily    ceFEPIme (MAXIPIME) 2 g in dextrose 5 % in water (D5W) 100 mL IVPB (MB+) Q12H    DAPTOmycin (CUBICIN) 650 mg in sodium chloride 0.9% SolP 50 mL IVPB Q48H    dextrose 10% bolus 125 mL 125 mL PRN    dextrose 10% bolus 250 mL 250 mL PRN    famotidine tablet 20 mg Daily    folic acid tablet 1 mg Daily    glucagon (human recombinant) injection 1 mg PRN    glucose chewable tablet 16 g PRN    glucose chewable tablet 24 g PRN    heparin (porcine) injection 5,000 Units Q8H    insulin aspart U-100 pen 0-5 Units QID (AC + HS) PRN    magnesium oxide tablet 800 mg PRN    naloxone 0.4 mg/mL injection 0.02 mg PRN    ondansetron disintegrating tablet 8 mg Q8H PRN    potassium bicarbonate disintegrating tablet 35 mEq PRN    potassium, sodium phosphates 280-160-250 mg packet 2 packet PRN    senna-docusate 8.6-50 mg per tablet 2 tablet BID PRN    sodium chloride 0.9% flush 10 mL Q12H PRN     Current Outpatient Medications   Medication    albuterol (PROVENTIL/VENTOLIN HFA) 90 mcg/actuation inhaler    allopurinoL (ZYLOPRIM) 100 MG tablet    amiodarone (PACERONE) 200 MG Tab    ascorbic acid, vitamin C, (VITAMIN C) 500 MG tablet    ELIQUIS 5 mg Tab    ferrous sulfate (FEOSOL) 325 mg (65 mg iron) Tab tablet    folic acid (FOLVITE) 1 MG tablet    furosemide (LASIX) 80 MG tablet    gabapentin (NEURONTIN) 100 MG capsule    insulin aspart U-100  (NOVOLOG) 100 unit/mL injection    metOLazone (ZAROXOLYN) 5 MG tablet    metoprolol succinate (TOPROL-XL) 25 MG 24 hr tablet    multivitamin (ONE DAILY MULTIVITAMIN) per tablet    potassium chloride (MICRO-K) 10 MEQ CpSR    rosuvastatin (CRESTOR) 40 MG Tab    senna-docusate 8.6-50 mg (PERICOLACE) 8.6-50 mg per tablet    tamsulosin (FLOMAX) 0.4 mg Cap       Objective:     Vital Signs (Most Recent):  Temp: 98.5 °F (36.9 °C) (12/29/23 1415)  Pulse: (!) 45 (12/29/23 1415)  Resp: 20 (12/29/23 1415)  BP: 130/63 (12/29/23 1415)  SpO2: 98 % (12/29/23 1415) Vital Signs (24h Range):  Temp:  [97.7 °F (36.5 °C)-98.6 °F (37 °C)] 98.5 °F (36.9 °C)  Pulse:  [42-51] 45  Resp:  [17-34] 20  SpO2:  [91 %-100 %] 98 %  BP: (107-135)/(51-74) 130/63     Weight: 80.7 kg (178 lb) (12/29/23 0703)  Body mass index is 24.83 kg/m².  Body surface area is 2.01 meters squared.    I/O last 3 completed shifts:  In: -   Out: 500 [Urine:500]     Physical Exam  Vitals and nursing note reviewed.   Constitutional:       General: He is sleeping. He is not in acute distress.     Appearance: He is normal weight. He is ill-appearing. He is not toxic-appearing.      Interventions: Nasal cannula in place.   HENT:      Head: Normocephalic and atraumatic.      Nose: Nose normal.      Mouth/Throat:      Mouth: Mucous membranes are dry.      Pharynx: No oropharyngeal exudate.   Eyes:      General: No scleral icterus.        Right eye: No discharge.         Left eye: No discharge.      Pupils: Pupils are equal, round, and reactive to light.   Cardiovascular:      Rate and Rhythm: Bradycardia present.   Pulmonary:      Effort: Pulmonary effort is normal. No respiratory distress.      Breath sounds: No wheezing or rales.   Abdominal:      General: Abdomen is flat.      Tenderness: There is abdominal tenderness (bilateral flank).   Musculoskeletal:      Cervical back: Normal range of motion. No rigidity.      Comments: Bilateral heels wrapped in ACE bandages.     Lymphadenopathy:      Cervical: No cervical adenopathy.   Neurological:      Mental Status: He is easily aroused.      Comments: Difficult to understand, however is following commands. Alert to name.          Significant Labs:  CBC:   Recent Labs   Lab 12/29/23 0446   WBC 10.59   RBC 2.68*   HGB 6.4*   HCT 20.7*      MCV 77*   MCH 23.9*   MCHC 30.9*     CMP:   Recent Labs   Lab 12/29/23 0446      CALCIUM 8.3*   ALBUMIN 2.0*   PROT 6.4   *   K 3.4*   CO2 24   CL 90*   *   CREATININE 7.7*   ALKPHOS 88   ALT 91*   AST 63*   BILITOT 0.5     All labs within the past 24 hours have been reviewed.

## 2023-12-29 NOTE — ASSESSMENT & PLAN NOTE
HR 30's reported at NH prior to admission. On admit HR mid 40's, see EKG results below. Concern for complete heart block on tele, will reach out to cardiology for opinion     Results for orders placed or performed during the hospital encounter of 12/28/23   EKG 12-lead    Collection Time: 12/28/23  3:22 PM    Narrative    Test Reason : R00.1,    Vent. Rate : 045 BPM     Atrial Rate : 042 BPM     P-R Int : 000 ms          QRS Dur : 160 ms      QT Int : 652 ms       P-R-T Axes : 000 -21 041 degrees     QTc Int : 563 ms    Sinus rhythm  AV dissociation -  Complete heart block  Left bundle branch block  Abnormal ECG  When compared with ECG of 27-NOV-2023 15:38,  Complete heart block Now present  Confirmed by Abhijeet Pizarro MD ([id number]) on 12/28/2023 5:56:15 PM    Referred By: [email or web address]   SELF           Confirmed By:Abhijeet Pizarro MD

## 2023-12-30 LAB
ALBUMIN SERPL BCP-MCNC: 1.9 G/DL (ref 3.5–5.2)
ALP SERPL-CCNC: 91 U/L (ref 55–135)
ALT SERPL W/O P-5'-P-CCNC: 82 U/L (ref 10–44)
ANION GAP SERPL CALC-SCNC: 18 MMOL/L (ref 8–16)
AST SERPL-CCNC: 61 U/L (ref 10–40)
BACTERIA UR CULT: ABNORMAL
BASOPHILS # BLD AUTO: 0.03 K/UL (ref 0–0.2)
BASOPHILS NFR BLD: 0.3 % (ref 0–1.9)
BILIRUB SERPL-MCNC: 0.6 MG/DL (ref 0.1–1)
BUN SERPL-MCNC: 119 MG/DL (ref 8–23)
CALCIUM SERPL-MCNC: 8.2 MG/DL (ref 8.7–10.5)
CHLORIDE SERPL-SCNC: 95 MMOL/L (ref 95–110)
CO2 SERPL-SCNC: 20 MMOL/L (ref 23–29)
CREAT SERPL-MCNC: 6.3 MG/DL (ref 0.5–1.4)
DIFFERENTIAL METHOD BLD: ABNORMAL
EOSINOPHIL # BLD AUTO: 0 K/UL (ref 0–0.5)
EOSINOPHIL NFR BLD: 0.4 % (ref 0–8)
ERYTHROCYTE [DISTWIDTH] IN BLOOD BY AUTOMATED COUNT: 17.8 % (ref 11.5–14.5)
EST. GFR  (NO RACE VARIABLE): 8.7 ML/MIN/1.73 M^2
GLUCOSE SERPL-MCNC: 79 MG/DL (ref 70–110)
HCT VFR BLD AUTO: 24.9 % (ref 40–54)
HGB BLD-MCNC: 8 G/DL (ref 14–18)
IMM GRANULOCYTES # BLD AUTO: 0.11 K/UL (ref 0–0.04)
IMM GRANULOCYTES NFR BLD AUTO: 1 % (ref 0–0.5)
LACTATE SERPL-SCNC: 0.8 MMOL/L (ref 0.5–2.2)
LYMPHOCYTES # BLD AUTO: 0.5 K/UL (ref 1–4.8)
LYMPHOCYTES NFR BLD: 3.9 % (ref 18–48)
MAGNESIUM SERPL-MCNC: 1.9 MG/DL (ref 1.6–2.6)
MAGNESIUM SERPL-MCNC: 2.5 MG/DL (ref 1.6–2.6)
MCH RBC QN AUTO: 26.3 PG (ref 27–31)
MCHC RBC AUTO-ENTMCNC: 32.1 G/DL (ref 32–36)
MCV RBC AUTO: 82 FL (ref 82–98)
MONOCYTES # BLD AUTO: 0.8 K/UL (ref 0.3–1)
MONOCYTES NFR BLD: 6.9 % (ref 4–15)
NEUTROPHILS # BLD AUTO: 10 K/UL (ref 1.8–7.7)
NEUTROPHILS NFR BLD: 87.5 % (ref 38–73)
NRBC BLD-RTO: 0 /100 WBC
PHOSPHATE SERPL-MCNC: 5.9 MG/DL (ref 2.7–4.5)
PLATELET # BLD AUTO: 349 K/UL (ref 150–450)
PMV BLD AUTO: 8.9 FL (ref 9.2–12.9)
POCT GLUCOSE: 114 MG/DL (ref 70–110)
POCT GLUCOSE: 131 MG/DL (ref 70–110)
POTASSIUM SERPL-SCNC: 3.4 MMOL/L (ref 3.5–5.1)
POTASSIUM SERPL-SCNC: 3.4 MMOL/L (ref 3.5–5.1)
PROT SERPL-MCNC: 6.5 G/DL (ref 6–8.4)
RBC # BLD AUTO: 3.04 M/UL (ref 4.6–6.2)
SODIUM SERPL-SCNC: 133 MMOL/L (ref 136–145)
WBC # BLD AUTO: 11.42 K/UL (ref 3.9–12.7)

## 2023-12-30 PROCEDURE — 36415 COLL VENOUS BLD VENIPUNCTURE: CPT | Performed by: STUDENT IN AN ORGANIZED HEALTH CARE EDUCATION/TRAINING PROGRAM

## 2023-12-30 PROCEDURE — 20600001 HC STEP DOWN PRIVATE ROOM

## 2023-12-30 PROCEDURE — 80100014 HC HEMODIALYSIS 1:1

## 2023-12-30 PROCEDURE — 25000003 PHARM REV CODE 250: Performed by: INTERNAL MEDICINE

## 2023-12-30 PROCEDURE — 84100 ASSAY OF PHOSPHORUS: CPT | Performed by: INTERNAL MEDICINE

## 2023-12-30 PROCEDURE — 83735 ASSAY OF MAGNESIUM: CPT | Performed by: INTERNAL MEDICINE

## 2023-12-30 PROCEDURE — 63600175 PHARM REV CODE 636 W HCPCS: Performed by: STUDENT IN AN ORGANIZED HEALTH CARE EDUCATION/TRAINING PROGRAM

## 2023-12-30 PROCEDURE — 93005 ELECTROCARDIOGRAM TRACING: CPT | Performed by: INTERNAL MEDICINE

## 2023-12-30 PROCEDURE — 84132 ASSAY OF SERUM POTASSIUM: CPT | Performed by: INTERNAL MEDICINE

## 2023-12-30 PROCEDURE — 99291 CRITICAL CARE FIRST HOUR: CPT | Mod: ,,, | Performed by: INTERNAL MEDICINE

## 2023-12-30 PROCEDURE — 87040 BLOOD CULTURE FOR BACTERIA: CPT | Mod: 59 | Performed by: STUDENT IN AN ORGANIZED HEALTH CARE EDUCATION/TRAINING PROGRAM

## 2023-12-30 PROCEDURE — 99233 SBSQ HOSP IP/OBS HIGH 50: CPT | Mod: ,,, | Performed by: INTERNAL MEDICINE

## 2023-12-30 PROCEDURE — 83605 ASSAY OF LACTIC ACID: CPT | Performed by: STUDENT IN AN ORGANIZED HEALTH CARE EDUCATION/TRAINING PROGRAM

## 2023-12-30 PROCEDURE — 63600175 PHARM REV CODE 636 W HCPCS: Performed by: INTERNAL MEDICINE

## 2023-12-30 PROCEDURE — 25000003 PHARM REV CODE 250: Performed by: STUDENT IN AN ORGANIZED HEALTH CARE EDUCATION/TRAINING PROGRAM

## 2023-12-30 PROCEDURE — 93010 ELECTROCARDIOGRAM REPORT: CPT | Mod: ,,, | Performed by: INTERNAL MEDICINE

## 2023-12-30 PROCEDURE — 85025 COMPLETE CBC W/AUTO DIFF WBC: CPT | Performed by: INTERNAL MEDICINE

## 2023-12-30 PROCEDURE — 63600175 PHARM REV CODE 636 W HCPCS

## 2023-12-30 PROCEDURE — 80053 COMPREHEN METABOLIC PANEL: CPT | Performed by: INTERNAL MEDICINE

## 2023-12-30 PROCEDURE — 83735 ASSAY OF MAGNESIUM: CPT | Mod: 91 | Performed by: INTERNAL MEDICINE

## 2023-12-30 PROCEDURE — 25000003 PHARM REV CODE 250

## 2023-12-30 PROCEDURE — 93005 ELECTROCARDIOGRAM TRACING: CPT

## 2023-12-30 RX ORDER — METHOCARBAMOL 500 MG/1
500 TABLET, FILM COATED ORAL 4 TIMES DAILY PRN
Status: DISCONTINUED | OUTPATIENT
Start: 2023-12-30 | End: 2024-01-07

## 2023-12-30 RX ORDER — POTASSIUM CHLORIDE 20 MEQ/1
20 TABLET, EXTENDED RELEASE ORAL ONCE
Status: COMPLETED | OUTPATIENT
Start: 2023-12-30 | End: 2023-12-30

## 2023-12-30 RX ORDER — MAGNESIUM SULFATE HEPTAHYDRATE 40 MG/ML
2 INJECTION, SOLUTION INTRAVENOUS ONCE
Status: COMPLETED | OUTPATIENT
Start: 2023-12-30 | End: 2023-12-30

## 2023-12-30 RX ORDER — SODIUM CHLORIDE 9 MG/ML
INJECTION, SOLUTION INTRAVENOUS ONCE
Status: DISCONTINUED | OUTPATIENT
Start: 2023-12-30 | End: 2024-01-02

## 2023-12-30 RX ORDER — HEPARIN SODIUM 1000 [USP'U]/ML
1000 INJECTION, SOLUTION INTRAVENOUS; SUBCUTANEOUS
Status: DISCONTINUED | OUTPATIENT
Start: 2023-12-30 | End: 2024-01-19 | Stop reason: HOSPADM

## 2023-12-30 RX ORDER — LIDOCAINE 50 MG/G
1 PATCH TOPICAL
Status: DISCONTINUED | OUTPATIENT
Start: 2023-12-30 | End: 2024-01-19 | Stop reason: HOSPADM

## 2023-12-30 RX ORDER — ACETAMINOPHEN 500 MG
1000 TABLET ORAL EVERY 8 HOURS
Status: DISCONTINUED | OUTPATIENT
Start: 2023-12-30 | End: 2024-01-19 | Stop reason: HOSPADM

## 2023-12-30 RX ORDER — SODIUM CHLORIDE 9 MG/ML
INJECTION, SOLUTION INTRAVENOUS
Status: DISCONTINUED | OUTPATIENT
Start: 2023-12-30 | End: 2024-01-19 | Stop reason: HOSPADM

## 2023-12-30 RX ADMIN — POTASSIUM CHLORIDE 20 MEQ: 1500 TABLET, EXTENDED RELEASE ORAL at 06:12

## 2023-12-30 RX ADMIN — LIDOCAINE 5% 1 PATCH: 700 PATCH TOPICAL at 06:12

## 2023-12-30 RX ADMIN — METHOCARBAMOL 500 MG: 500 TABLET ORAL at 06:12

## 2023-12-30 RX ADMIN — HEPARIN SODIUM 5000 UNITS: 5000 INJECTION INTRAVENOUS; SUBCUTANEOUS at 03:12

## 2023-12-30 RX ADMIN — FOLIC ACID 1 MG: 1 TABLET ORAL at 09:12

## 2023-12-30 RX ADMIN — ATORVASTATIN CALCIUM 80 MG: 40 TABLET, FILM COATED ORAL at 09:12

## 2023-12-30 RX ADMIN — HEPARIN SODIUM 5000 UNITS: 5000 INJECTION INTRAVENOUS; SUBCUTANEOUS at 06:12

## 2023-12-30 RX ADMIN — HEPARIN SODIUM 1000 UNITS: 1000 INJECTION, SOLUTION INTRAVENOUS; SUBCUTANEOUS at 06:12

## 2023-12-30 RX ADMIN — ALLOPURINOL 50 MG: 300 TABLET ORAL at 09:12

## 2023-12-30 RX ADMIN — FAMOTIDINE 20 MG: 20 TABLET, FILM COATED ORAL at 09:12

## 2023-12-30 RX ADMIN — DAPTOMYCIN 650 MG: 350 INJECTION, POWDER, LYOPHILIZED, FOR SOLUTION INTRAVENOUS at 11:12

## 2023-12-30 RX ADMIN — MAGNESIUM SULFATE HEPTAHYDRATE 2 G: 40 INJECTION, SOLUTION INTRAVENOUS at 06:12

## 2023-12-30 RX ADMIN — HEPARIN SODIUM 5000 UNITS: 5000 INJECTION INTRAVENOUS; SUBCUTANEOUS at 10:12

## 2023-12-30 RX ADMIN — ACETAMINOPHEN 1000 MG: 500 TABLET ORAL at 09:12

## 2023-12-30 RX ADMIN — CEFEPIME 2 G: 2 INJECTION, POWDER, FOR SOLUTION INTRAVENOUS at 08:12

## 2023-12-30 NOTE — PROGRESS NOTES
[facility] - Cardiac Intensive Care  Cardiac Electrophysiology  Progress Note    Admission Date: 12/28/2023  Code Status: Full Code   Attending Physician: Frantz Gross MD   Expected Discharge Date: 12/30/2023  Principal Problem:Complete heart block    Subjective:     Interval History: Got HD yesterday for 2 hours. Still altered, unsure if secondary to uremia vs infx. Persists in CHB, rates mid 40s.    Review of Systems   Unable to perform ROS: Mental status change     Objective:     Vital Signs (Most Recent):  Temp: 98.7 °F (37.1 °C) (12/30/23 0705)  Pulse: (!) 43 (12/30/23 1101)  Resp: 20 (12/30/23 1101)  BP: (!) 141/64 (12/30/23 1101)  SpO2: 100 % (12/30/23 1101) Vital Signs (24h Range):  Temp:  [98.4 °F (36.9 °C)-98.7 °F (37.1 °C)] 98.7 °F (37.1 °C)  Pulse:  [43-65] 43  Resp:  [13-34] 20  SpO2:  [94 %-100 %] 100 %  BP: (105-175)/(56-76) 141/64     Weight: 80.7 kg (178 lb)  Body mass index is 24.83 kg/m².     SpO2: 100 %        Physical Exam  Constitutional:       General: He is not in acute distress.     Appearance: He is ill-appearing.   HENT:      Mouth/Throat:      Mouth: Mucous membranes are dry.   Cardiovascular:      Rate and Rhythm: Bradycardia present.      Heart sounds: Murmur heard.   Pulmonary:      Effort: Pulmonary effort is normal.      Breath sounds: Normal breath sounds.   Musculoskeletal:      Right lower leg: No edema.      Left lower leg: No edema.   Skin:     General: Skin is warm.   Neurological:      Mental Status: He is disoriented.            Significant Labs: All pertinent lab results from the last 24 hours have been reviewed.    Significant Imaging:  Reviewed  Assessment and Plan:     * Complete heart block  Patient with complete heart block, Previous EKG with first degree AV block and LBBB. Patient on metoprolol and amiodarone as an outpatient  Patient hemodynamically stable, but is unable to give a reliable history    Recommendations:  -Hold AV iker agents  -No need for TVP at  this time  -Agree he is stable for stepdown from ICU. He does not appear to need temporary pacing at this time or since admission  -Would consider ID consultation. In the setting of his multidrug resistant infecitons and now possibly pyelonephritis, would like to have good source control prior to device placement. He will likely be a better candidate for a leadless pacemaker        Connor M Gillies, MD  Cardiac Electrophysiology  [facility] - Cardiac Intensive Care

## 2023-12-30 NOTE — ASSESSMENT & PLAN NOTE
Patient with complete heart block, Previous EKG with first degree AV block and LBBB. Patient on metoprolol and amiodarone as an outpatient  Patient hemodynamically stable, but is unable to give a reliable history    Recommendations:  -Hold AV iker agents  -No need for TVP at this time  -Agree he is stable for stepdown from ICU. He does not appear to need temporary pacing at this time or since admission  -Would consider ID consultation. In the setting of his multidrug resistant infecitons and now possibly pyelonephritis, would like to have good source control prior to device placement. He will likely be a better candidate for a leadless pacemaker

## 2023-12-30 NOTE — NURSING TRANSFER
Nursing Transfer Note      12/29/2023   9:27 PM    Nurse giving handoff: AFTAB Hunt RN   Nurse receiving handoff:KAIA Roche RN     Reason patient is being transferred: [facility]    Transfer To: [facility] [address] From: ED     Transfer via stretcher    Transfer with cardiac monitoring    Transported by AFTAB Hunt RN     Transfer Vital Signs:  Blood Pressure:131/77  Heart Rate:49  O2:RA; 96%  Temperature:98.4   Respirations:23    Additional Lines: De Leon Catheter    4eyes on Skin: yes    Medicines sent: no    Any special needs or follow-up needed: dialysis     Patient belongings transferred with patient: Yes    Chart send with patient: Yes    Notified: pt sister aware     Patient reassessed at: 12/29 2100  1  Upon arrival to floor: cardiac monitor applied, patient oriented to room, call bell in reach, and bed in lowest position

## 2023-12-30 NOTE — ASSESSMENT & PLAN NOTE
Admitted on 11/27 from NH for b/l wounds. 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 Wound swab of R ulcer with MRSA.   s/p bone biopsy and debridement by podiatry   On 6 weeks of IV abx Dapto with end date end of January 2024

## 2023-12-30 NOTE — ASSESSMENT & PLAN NOTE
Suspect this patient has Complicated cystitis given retention and positive UA  Ucx growing GNR    Plan:  - F/u urine gram stain and culture  - Cefepime 2g IV q24h, received CTX in ED  - If pt is/becomes febrile then draw 2 blood culture tubes, each from different site

## 2023-12-30 NOTE — PLAN OF CARE
CICU DAILY GOALS       A: Awake    RASS: Goal -  0  Actual -  0   Restraint necessity:  0  B: Breath   SBT: Not intubated   C: Coordinate A & B, analgesics/sedatives   Pain: managed    SAT: Not intubated  D: Delirium   CAM-ICU:    E: Early(intubated/ Progressive (non-intubated) Mobility   MOVE Screen: Fail   Activity: Activity Management: Rolling - L1  FAS: Feeding/Nutrition   Diet order: Diet/Nutrition Received: NPO,   Fluid restriction:     Nutritional Supplement Intake: Quantity 0, Type:   T: Thrombus   DVT prophylaxis: VTE Required Core Measure: Pharmacological prophylaxis initiated/maintained  H: HOB Elevation   Head of Bed (HOB) Positioning: HOB at 30-45 degrees  U: Ulcer Prophylaxis   GI: no  G: Glucose control   managed    S: Skin   Bundle compliance: yes   Bathing/Skin Care: bath, complete, dressed/undressed, incontinence care, linen changed Date: 12/29 PM  B: Bowel Function   Incontinence   I: Indwelling Catheters   De Leon necessity:      Urethral Catheter 12/28/23 1600 Double-lumen 16 Fr.-Reason for Continuing Urinary Catheterization: Urinary retention, Critically ill in ICU and requiring hourly monitoring of intake/output   CVC necessity: Yes   IPAD offered: Not appropriate  D: De-escalation Antibx   Yes  Plan for the day   TBD   Family/Goals of care/Code Status   Code Status: Full Code     No acute events throughout day, VS and assessment per flow sheet, patient progressing towards goals as tolerated, plan of care reviewed with Chato Bowie and family, all concerns addressed, will continue to monitor.

## 2023-12-30 NOTE — PROGRESS NOTES
Nurses Note -- 4 Eyes      12/29/2023   9:22 PM      Skin assessed during: Admit      [] No Altered Skin Integrity Present    []Prevention Measures Documented      [x] Yes- Altered Skin Integrity Present or Discovered   [x] LDA Added if Not in Epic (Describe Wound)   [x] New Altered Skin Integrity was Present on Admit and Documented in LDA   [x] Wound Image Taken    Wound Care Consulted? Yes    Attending Nurse:  Kaley Marshall RN/Staff Member:   JOYCE Harding

## 2023-12-30 NOTE — HPI
73 year old male with DM2, HTN, CAD, HFpEF, HLD, CKD3b, and hx of alcohol use disorder with recent admission for MRSA osteomyelitis of BL heel ulcers on IV dapto end date 1/10/24 who presented from [facility] with RLQ pain, fatigue, bradycardia ~30's, weakness, and lab abnormalities including Cr 7.1 and . On interview patient lethargic and disoriented and poor historian, history obtained via chart review.   In ED pt AF, bradycardic ~40's, RR in the mid 20's, BP ~110/50, satting 100% on 2L NC. Labs significant for Hb 7.2, ANC 8.3, Na 132, Cl 87, HCO3 23, AG 22, , Cr 7.5, phos 8.3, Albumin 1.9, AST 74, , Lipase 146. Patient with urinary retention and jackson placed with purulent 500ml output . EKG with complete heart block. Patient initially admitted to medicine but transferred to CCU given complete heart block

## 2023-12-30 NOTE — PROGRESS NOTES
[facility] - Cardiac Intensive Care  Nephrology  Progress Note    Patient Name: Chato Bowie  MRN: [id number]  Admission Date: 12/28/2023  Hospital Length of Stay: 2 days  Attending Provider: Stephen Werner MD   Primary Care Physician: Irlanda Valenzuela -  Principal Problem:Complete heart block    Subjective:     HPI: 73 year old man with a history of longstanding T2DM, HTN, HFpEF, severe aortic stenosis, chronic bradycardia, CKD3b, newly diagnosed bilateral heel osteo on daptomycin admitted from nursing home for electrolyte abnormalities, bilateral flank pain, leg pain. Per report NH obtained labs had had a BUN of 170 for which [place] [facility] [place] In the ED the BUN of 170 was confirmed along with a creatinine of 7.5 (baseline creatinine 2-2.5 as of 2 weeks ago). Of note, K is 3.6 with CO2 of 23. A jackson was placed with 500 purulent urine out and per family over phone had been dealing with some prostate issues in the past. UA revealed a specific gravity of 1.010, pH of 6, 2+ protein (in setting of ELIZABETH), 3+ leukocytes with many bacteria and WBC clumps.     Nephrology consulted for ELIZABETH and possible dialysis needs    Interval History: Patient seen this morning, no acute distress. Tolerated HD well yesterday.     Review of patient's allergies indicates:  No Known Allergies  Current Facility-Administered Medications   Medication Frequency    0.9%  NaCl infusion (for blood administration) Q24H PRN    0.9%  NaCl infusion Once    0.9%  NaCl infusion Continuous    0.9%  NaCl infusion PRN    0.9%  NaCl infusion Once    acetaminophen tablet 650 mg Q4H PRN    allopurinol split tablet 50 mg Daily    atorvastatin tablet 80 mg Daily    ceFEPIme (MAXIPIME) 2 g in dextrose 5 % in water (D5W) 100 mL IVPB (MB+) Q24H    DAPTOmycin (CUBICIN) 650 mg in sodium chloride 0.9% SolP 50 mL IVPB Q48H    dextrose 10% bolus 125 mL 125 mL PRN    dextrose 10% bolus 250 mL 250 mL PRN    famotidine tablet 20 mg Daily     folic acid tablet 1 mg Daily    glucagon (human recombinant) injection 1 mg PRN    glucose chewable tablet 16 g PRN    glucose chewable tablet 24 g PRN    heparin (porcine) injection 1,000 Units PRN    heparin (porcine) injection 5,000 Units Q8H    insulin aspart U-100 pen 0-5 Units QID (AC + HS) PRN    magnesium oxide tablet 800 mg PRN    naloxone 0.4 mg/mL injection 0.02 mg PRN    ondansetron disintegrating tablet 8 mg Q8H PRN    potassium bicarbonate disintegrating tablet 35 mEq PRN    potassium, sodium phosphates 280-160-250 mg packet 2 packet PRN    senna-docusate 8.6-50 mg per tablet 2 tablet BID PRN    sodium chloride 0.9% bolus 250 mL 250 mL PRN    sodium chloride 0.9% flush 10 mL Q12H PRN       Objective:     Vital Signs (Most Recent):  Temp: 97.7 °F (36.5 °C) (12/30/23 1101)  Pulse: (!) 40 (12/30/23 1400)  Resp: 16 (12/30/23 1400)  BP: (!) 143/65 (12/30/23 1400)  SpO2: 99 % (12/30/23 1400) Vital Signs (24h Range):  Temp:  [97.7 °F (36.5 °C)-98.7 °F (37.1 °C)] 97.7 °F (36.5 °C)  Pulse:  [40-65] 40  Resp:  [13-31] 16  SpO2:  [94 %-100 %] 99 %  BP: (105-175)/(56-76) 143/65     Weight: 80.7 kg (178 lb) (12/29/23 0703)  Body mass index is 24.83 kg/m².  Body surface area is 2.01 meters squared.    I/O last 3 completed shifts:  In: 1820.3 [P.O.:240; I.V.:41; Blood:800; Other:400; IV Piggyback:339.2]  Out: 830 [Urine:430; Other:400]    Physical Exam  Vitals and nursing note reviewed.   Constitutional:       General: He is sleeping. He is not in acute distress.     Appearance: He is normal weight. He is ill-appearing. He is not toxic-appearing.      Interventions: Nasal cannula in place.   HENT:      Head: Normocephalic and atraumatic.      Nose: Nose normal.      Mouth/Throat:      Mouth: Mucous membranes are dry.      Pharynx: No oropharyngeal exudate.   Eyes:      General: No scleral icterus.        Right eye: No discharge.         Left eye: No discharge.      Pupils: Pupils are equal, round, and reactive to  light.   Cardiovascular:      Rate and Rhythm: Bradycardia present.   Pulmonary:      Effort: Pulmonary effort is normal. No respiratory distress.      Breath sounds: No wheezing or rales.   Abdominal:      General: Abdomen is flat.      Tenderness: There is abdominal tenderness (bilateral flank).   Musculoskeletal:      Cervical back: Normal range of motion. No rigidity.      Comments: Bilateral heels wrapped in ACE bandages.    Lymphadenopathy:      Cervical: No cervical adenopathy.   Neurological:      Mental Status: He is easily aroused.      Comments: Difficult to understand, however is following commands. Alert to name.          Significant Labs:  CBC:   Recent Labs   Lab 12/30/23  0406   WBC 11.42   RBC 3.04*   HGB 8.0*   HCT 24.9*      MCV 82   MCH 26.3*   MCHC 32.1       CMP:   Recent Labs   Lab 12/30/23  0406   GLU 79   CALCIUM 8.2*   ALBUMIN 1.9*   PROT 6.5   *   K 3.4*   CO2 20*   CL 95   *   CREATININE 6.3*   ALKPHOS 91   ALT 82*   AST 61*   BILITOT 0.6       All labs within the past 24 hours have been reviewed.     Assessment/Plan:     Cardiac/Vascular  * Complete heart block  - defer to primary team     Renal/  Acute kidney injury superimposed on CKD  Baseline creatinine 2-2.5 (longstanding DM with bilateral osteomyelitis, HTN)  Multiple comorbitites as well    On admission serum creatinine 7.5  ELIZABETH appears to be multifactorial in setting of obstructive nephropathy, probably pyelonephritis, low effective circulating volume, possible AIN in setting of longstanding ABX (though daptomycin less likely)    Obstruction relieved by jackson placed 12/28/23 with 500 cc of purulent UOP. Supposedly follows with urology in outpatient setting. Is on tamsulosin.   Pyelonephritis with UA showing 3+ leuks with many bacteria. History of proteus in past.   Low effective circulating volume suggested by physical exam, evidence of acute liver injury in setting of HFpEF with severe aortic stenosis. Home  medication included metolazone 1 mg bid    Dialysis consent obtained 12/28/23    Plan/Recommendation  Will plan for short 2.5 hr intermittent HD session today for clearance ONLY. Labs adjusted per protocol.   Will need additional clearance likely Monday for ongoing clearance and volume management.   Continue abx (Ceftriaxone started 12/28/23)  Continue jackson and monitor UOP  -Strict ins and outs  -Avoid nephrotoxic agents if possible and renally dose medications  -Avoid drastic hemodynamic changes if possible        UTI (urinary tract infection)  See ELIZABETH    Stage 3b chronic kidney disease (CKD)  See ELIZABETH        Thank you for your consult. I will follow-up with patient. Please contact us if you have any additional questions.    Chapo Taylor MD  Nephrology  [facility] - [facility]

## 2023-12-30 NOTE — PROGRESS NOTES
12/29/23 2230   During Hemodialysis Assessment   Blood Flow Rate (mL/min) 200 mL/min   Dialysate Flow Rate (mL/min) 500 ml/min   Ultrafiltration Rate (mL/Hr) 200 mL/Hr   Arteriovenous Lines Secure Yes   Arterial Pressure (mmHg) -60 mmHg   Venous Pressure (mmHg) 50   Blood Volume Processed (Liters) 0 L   UF Removed (mL) 0 mL   TMP 60   Venous Line in Air Detector Yes   Intake (mL) 200 mL   Intra-Hemodialysis Comments HD initiated     Report received from primary RN. HD started per MD order.

## 2023-12-30 NOTE — PROGRESS NOTES
[facility] - [facility]  Cardiology  Progress Note    Patient Name: Chato Bowie  MRN: [id number]  Admission Date: 12/28/2023  Hospital Length of Stay: 2 days  Code Status: Full Code   Attending Physician: Stephen Werner MD   Primary Care Physician: Irlanda Valenzuela -  Expected Discharge Date: 12/30/2023  Principal Problem:Complete heart block    Subjective:     Hospital Course:   Admitted for CHB which EP thought to be 2/2 uremia/ELIZABETH and infection. Has not required pacing and is HDS. Recommended holding AV iker agents. BUN and sCr newly elevated, seen by nephrology who started dialysis for clearance. Tolerated dialysis with plans to further dialyze. UCx growing GNR, on cefepime. Continued daptomycin for his BL heel OM. EP holding on PPM insertion for now, would like to see if he improves with dialysis; likely a better candidate for leadless pacemaker given his multiple infections. Consider ID consult for his multidrug resistant organisms and possible pyelonephritis to ensure good source control. Mildly delirious which could be 2/2 his uremia and infection, further assessing with CT head.     Interval History: HR remains in 40s, HDS. Asymptomatic. Mildly delirious, oriented to place and month, CT head pending. Plans for further dialysis today. Will stepdown to     Review of Systems   Unable to perform ROS: Mental status change     Objective:     Vital Signs (Most Recent):  Temp: 98.7 °F (37.1 °C) (12/30/23 0705)  Pulse: (!) 43 (12/30/23 1101)  Resp: 20 (12/30/23 1101)  BP: (!) 141/64 (12/30/23 1101)  SpO2: 100 % (12/30/23 1101) Vital Signs (24h Range):  Temp:  [98.4 °F (36.9 °C)-98.7 °F (37.1 °C)] 98.7 °F (37.1 °C)  Pulse:  [43-65] 43  Resp:  [13-34] 20  SpO2:  [94 %-100 %] 100 %  BP: (105-175)/(56-76) 141/64     Weight: 80.7 kg (178 lb)  Body mass index is 24.83 kg/m².     SpO2: 100 %         Intake/Output Summary (Last 24 hours) at 12/30/2023 1302  Last data filed at 12/30/2023 0808  Gross per  24 hour   Intake 1662.21 ml   Output 630 ml   Net 1032.21 ml       Lines/Drains/Airways       Peripherally Inserted Central Catheter Line  Duration             PICC Double Lumen 12/07/23 1457 right brachial 22 days    PICC Double Lumen 12/07/23 1457 right cephalic 22 days              Central Venous Catheter Line  Duration             Trialysis (Dialysis) Catheter 12/29/23 2117 right internal jugular <1 day              Drain  Duration                  Urethral Catheter 12/28/23 1600 Double-lumen 16 Fr. 1 day                       Physical Exam  Vitals and nursing note reviewed.   Constitutional:       General: He is not in acute distress.     Appearance: He is ill-appearing.   HENT:      Head: Normocephalic and atraumatic.      Mouth/Throat:      Mouth: Mucous membranes are dry.   Eyes:      Extraocular Movements: Extraocular movements intact.      Pupils: Pupils are equal, round, and reactive to light.   Neck:      Vascular: No JVD.   Cardiovascular:      Rate and Rhythm: Bradycardia present.      Pulses: Normal pulses.      Heart sounds: Murmur (ejection systolic) heard.      Comments: Remains in CHB  Pulmonary:      Effort: Pulmonary effort is normal.      Breath sounds: Normal breath sounds. No wheezing, rhonchi or rales.   Abdominal:      General: Bowel sounds are normal. There is no distension.      Palpations: Abdomen is soft.      Tenderness: There is no abdominal tenderness.   Musculoskeletal:         General: No tenderness.      Right lower leg: No edema.      Left lower leg: No edema.      Comments: Uremic flap  BL heel wounds, dressed and wrapped   Skin:     General: Skin is warm.      Capillary Refill: Capillary refill takes less than 2 seconds.      Findings: No erythema or rash.   Neurological:      Mental Status: He is alert. He is disoriented.            Significant Labs: Blood Culture:   Recent Labs   Lab 12/30/23  0507 12/30/23  0513   LABBLOO No Growth to date No Growth to date   , CMP   Recent  Labs   Lab 12/28/23  1531 12/29/23  0446 12/30/23  0406   * 133* 133*   K 3.6 3.4* 3.4*   CL 87* 90* 95   CO2 23 24 20*   * 109 79   * 169* 119*   CREATININE 7.5* 7.7* 6.3*   CALCIUM 8.5* 8.3* 8.2*   PROT 6.6 6.4 6.5   ALBUMIN 1.9* 2.0* 1.9*   BILITOT 0.6 0.5 0.6   ALKPHOS 91 88 91   AST 74* 63* 61*   * 91* 82*   ANIONGAP 22* 19* 18*   , CBC   Recent Labs   Lab 12/28/23  1531 12/28/23  1538 12/29/23  0446 12/30/23  0406   WBC 10.05  --  10.59 11.42   HGB 7.2*  --  6.4* 8.0*   HCT 22.9*   < > 20.7* 24.9*     --  387 349    < > = values in this interval not displayed.   , and All pertinent lab results from the last 24 hours have been reviewed.    Significant Imaging:  All imaging from past 24 hours has been reviewed  Assessment and Plan:     Brief HPI: 73 year old male with DM2, HTN, CAD, HFpEF, HLD, CKD3b, and hx of alcohol use disorder with recent admission for MRSA osteomyelitis of BL heel ulcers on IV dapto end date 1/10/24 who presented from [facility] with RLQ pain, fatigue, bradycardia ~30's, weakness, and lab abnormalities including Cr 7.1 and . On interview patient lethargic and disoriented and poor historian, history obtained via chart review.   In ED pt AF, bradycardic ~40's, RR in the mid 20's, BP ~110/50, satting 100% on 2L NC. Labs significant for Hb 7.2, ANC 8.3, Na 132, Cl 87, HCO3 23, AG 22, , Cr 7.5, phos 8.3, Albumin 1.9, AST 74, , Lipase 146. Patient with urinary retention and jackson placed with purulent 500ml output . EKG with complete heart block. Patient initially admitted to medicine but transferred to CCU given Heart block    * Complete heart block  Patient with complete heart block, previous EKG with first degree AV block and LBBB  Patient on metoprolol and amiodarone as an outpatient  HR in the 30s on admission, has improved to ~ 45  Hemodynamically stable, he is a poor historian but denies any syncopal episodes.  EP  consulted, hold of TVP for now  - Pads on patient and atropine at bedside   - Hold AV iker agents  - EP following, thinks it could be 2/2 uremia/ELIZABETH/infection. No plans for PPM currently, would like to see how he improves with dialysis. May need leadless PPM insertion      Acute kidney injury superimposed on CKD  Baseline sCr ~ 2.1  sCr 7.5 on admission with . Was in urinary retention relieved with jackson catheter  Nephrology consulted for dialysis, receiving HD for clearance, making urine  Urine was purulent after the jackson insertion  Ucx growing GNR    Plan:  - continue cefepime for UTI, f/u Ucx  - nephrology consulted for recs and HD in the setting of likely uremic encephalopathy, appreciate recs, planning for more HD   - maintain jackson catheter  - monitor UOP  - Avoid nephrotoxins and renally dose meds for GFR listed above.    Osteomyelitis of foot  Admitted on 11/27 from NH for b/l wounds. 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 Wound swab of R ulcer with MRSA.   s/p bone biopsy and debridement by podiatry   On 6 weeks of IV abx Dapto with end date end of January 2024      Acute encephalopathy  Likely uremic encephalopathy in the setting of ARF with  and asterixis on exam. Infections (UTI and OM) could also be contributing  Remains disoriented but alertness improved, answers some questions appropriately. Oriented to place and month.   Currently able to protect airway but will continue to monitor  Will obtain CT head to exclude other causes of encephalopathy but appears to be improving with dialysis  Continue cefepime for UTI  Continue daptomycin for OM    Bradycardia  See CHB    UTI (urinary tract infection)  Suspect this patient has Complicated cystitis given retention and positive UA  Ucx growing GNR    Plan:  - F/u urine gram stain and culture  - Cefepime 2g IV q24h, received CTX in ED  - If pt is/becomes febrile then draw 2 blood culture tubes, each from different site          Paroxysmal atrial flutter  On Eliquis at home. Unknown last dose  -Will hold off restating due to possible PPM    Severe aortic valve stenosis  TTE ordered for further assessment     TTE:  Left Ventricle: The left ventricle is normal in size. Ventricular mass is normal. Normal wall thickness. There is concentric remodeling. Septal motion is consistent with bundle branch block. There is normal systolic function with a visually estimated ejection fraction of 55 - 60%. Grade II diastolic dysfunction.    Right Ventricle: Normal right ventricular cavity size. Systolic function is normal.    Left Atrium: Left atrium is severely dilated.    Aortic Valve: There is moderate aortic valve sclerosis. Moderately restricted motion. There is severe stenosis. Aortic valve area by VTI is 0.86 cm². Aortic valve peak velocity is 4.12 m/s. Mean gradient is 43 mmHg. The dimensionless index is 0.22. There is mild to moderate aortic regurgitation.    Mitral Valve: There is bileaflet sclerosis. There is mild annular dilation present.    Tricuspid Valve: There is mild to moderate regurgitation.    Pulmonary Artery: The estimated pulmonary artery systolic pressure is 72 mmHg.    IVC/SVC: Intermediate venous pressure at 8 mmHg.    Anemia  Likely multifactorial given CKD and infection. No signs of bleeding.  Has not received any PRBCs to date. Workup pending. Denies hematemesis, melena, hematochezia, other overt signs of bleeding     Current CBC reviewed-         Lab Results   Component Value Date     HGB 7.2 (L) 12/28/2023     HCT 22.9 (L) 12/28/2023      -Will obatain cnosent and transfuse if <7    Dyslipidemia  Continue statin    Epigastric abdominal pain  CTAP showing cholelithiasis, no evidence of acute cholecystitis, anasarca                  VTE Risk Mitigation (From admission, onward)           Ordered     heparin (porcine) injection 1,000 Units  As needed (PRN)         12/30/23 1056     heparin (porcine) injection 5,000 Units   Every 8 hours         12/29/23 1321     IP VTE HIGH RISK PATIENT  Once         12/28/23 1719     Place sequential compression device  Until discontinued         12/28/23 1719                  Marianna Cullen MD  Cardiology  [facility] - Cardiac Intensive Care

## 2023-12-30 NOTE — HOSPITAL COURSE
Admitted for CHB which EP thought to be 2/2 uremia/ELIZABETH and infection. Has not required pacing and is HDS. Recommended holding AV iker agents. BUN and sCr newly elevated, seen by nephrology who started dialysis for clearance. Tolerated dialysis with plans to further dialyze. ID recommended erta x 7 days for ESBL EColi UTI. Continued daptomycin for his BL heel OM.  C/b delirium for which CT head which was unremarkable. Stepped down to  but came back to CCU after he went into torsades on 12/31. CPR was initiated and pt regained pulse and consciousness; bradycardic post event with HR ~50. Electrolytes replaced. TVP placed in CCU; spoke with MPOA who confirmed a DNR status but that she would want a pacemaker placed. EP consulted for PPM insertion. Had another episode of torsades (~ 2 mins) that became CHB and then was paced to NSR. TVP placed. Palliative care consulted for GOC/ACP. Pt more alerted and oriented, states he would like to proceed with the procedure, Micra pacemaker placed on 1/5. Bleeding from groin site with BPs in 80/50s that recovered with 1 L NS bolus. Stepped down to hospital medicine 1/5. Received 2 unit prbc's 1/5-1/6 with stable H/H since. On further review with nephrology and patient's overall renal function, he was having good UOP and progressive improvement of renal function. Ok to hold off on TDC placement and outpatient HD arrangement. Trialysis catheter removed 01/13. US of Rt neck- Rt IJ clot appreciated. Eliquis increased to 10mg BID. Repeat Ucx +iv for proteus mirabilis ESBL- completed 7 d of erta. He had uptrending WBC 01/16- repeat blood cultures NGTD. Vanc added. CT A/P obtained due to abd pain-diffuse; unable to rule out cholecystitis, large amount of stool. RUQ u/s ordered with similar findings; patient refused HIDA scan. WBC improving. Improvement in mentation. Palliative care re-involved for further GOC discussion and POA decided on return to NH with hospice. Abx deescalated to  augmentin & doxy.

## 2023-12-30 NOTE — ASSESSMENT & PLAN NOTE
TTE ordered for further assessment     TTE:  Left Ventricle: The left ventricle is normal in size. Ventricular mass is normal. Normal wall thickness. There is concentric remodeling. Septal motion is consistent with bundle branch block. There is normal systolic function with a visually estimated ejection fraction of 55 - 60%. Grade II diastolic dysfunction.    Right Ventricle: Normal right ventricular cavity size. Systolic function is normal.    Left Atrium: Left atrium is severely dilated.    Aortic Valve: There is moderate aortic valve sclerosis. Moderately restricted motion. There is severe stenosis. Aortic valve area by VTI is 0.86 cm². Aortic valve peak velocity is 4.12 m/s. Mean gradient is 43 mmHg. The dimensionless index is 0.22. There is mild to moderate aortic regurgitation.    Mitral Valve: There is bileaflet sclerosis. There is mild annular dilation present.    Tricuspid Valve: There is mild to moderate regurgitation.    Pulmonary Artery: The estimated pulmonary artery systolic pressure is 72 mmHg.    IVC/SVC: Intermediate venous pressure at 8 mmHg.

## 2023-12-30 NOTE — ASSESSMENT & PLAN NOTE
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 Infections (UTI and OM) could also be contributing  Remains disoriented but alertness improved, answers some questions appropriately. Oriented to place and month.   Currently able to protect airway but will continue to monitor  Will obtain CT head to exclude other causes of encephalopathy but appears to be improving with dialysis  Continue cefepime for UTI  Continue daptomycin for OM

## 2023-12-30 NOTE — ASSESSMENT & PLAN NOTE
Baseline creatinine 2-2.5 (longstanding DM with bilateral osteomyelitis, HTN)  Multiple comorbitites as well    On admission serum creatinine 7.5  ELIZABETH appears to be multifactorial in setting of obstructive nephropathy, probably pyelonephritis, low effective circulating volume, possible AIN in setting of longstanding ABX (though daptomycin less likely)    Obstruction relieved by jackson placed 12/28/23 with 500 cc of purulent UOP. Supposedly follows with urology in outpatient setting. Is on tamsulosin.   Pyelonephritis with UA showing 3+ leuks with many bacteria. History of proteus in past.   Low effective circulating volume suggested by physical exam, evidence of acute liver injury in setting of HFpEF with severe aortic stenosis. Home medication included metolazone 1 mg bid    Dialysis consent obtained 12/28/23    Plan/Recommendation  Will plan for short 2.5 hr intermittent HD session today for clearance ONLY. Labs adjusted per protocol.   Will need additional clearance likely Monday for ongoing clearance and volume management.   Continue abx (Ceftriaxone started 12/28/23)  Continue jackson and monitor UOP  -Strict ins and outs  -Avoid nephrotoxic agents if possible and renally dose medications  -Avoid drastic hemodynamic changes if possible

## 2023-12-30 NOTE — PROGRESS NOTES
12/30/23 0040   Post-Hemodialysis Assessment   Rinseback Volume (mL) 200 mL   Blood Volume Processed (Liters) 24.6 L   Dialyzer Clearance Lightly streaked   Duration of Treatment 120 minutes   Additional Fluid Intake (mL) 400 mL   Total UF (mL) 400 mL   Net Fluid Removal 0   Patient Response to Treatment jose well   Post-Hemodialysis Comments stable     HD completed per MD order. Report given to primary RN.

## 2023-12-30 NOTE — SUBJECTIVE & OBJECTIVE
Interval History: HR remains in 40s, HDS. Asymptomatic. Mildly delirious, oriented to place and month, CT head pending. Plans for further dialysis today. Will stepdown to     Review of Systems   Unable to perform ROS: Mental status change     Objective:     Vital Signs (Most Recent):  Temp: 98.7 °F (37.1 °C) (12/30/23 0705)  Pulse: (!) 43 (12/30/23 1101)  Resp: 20 (12/30/23 1101)  BP: (!) 141/64 (12/30/23 1101)  SpO2: 100 % (12/30/23 1101) Vital Signs (24h Range):  Temp:  [98.4 °F (36.9 °C)-98.7 °F (37.1 °C)] 98.7 °F (37.1 °C)  Pulse:  [43-65] 43  Resp:  [13-34] 20  SpO2:  [94 %-100 %] 100 %  BP: (105-175)/(56-76) 141/64     Weight: 80.7 kg (178 lb)  Body mass index is 24.83 kg/m².     SpO2: 100 %         Intake/Output Summary (Last 24 hours) at 12/30/2023 1302  Last data filed at 12/30/2023 0808  Gross per 24 hour   Intake 1662.21 ml   Output 630 ml   Net 1032.21 ml       Lines/Drains/Airways       Peripherally Inserted Central Catheter Line  Duration             PICC Double Lumen 12/07/23 1457 right brachial 22 days    PICC Double Lumen 12/07/23 1457 right cephalic 22 days              Central Venous Catheter Line  Duration             Trialysis (Dialysis) Catheter 12/29/23 2117 right internal jugular <1 day              Drain  Duration                  Urethral Catheter 12/28/23 1600 Double-lumen 16 Fr. 1 day                       Physical Exam  Vitals and nursing note reviewed.   Constitutional:       General: He is not in acute distress.     Appearance: He is ill-appearing.   HENT:      Head: Normocephalic and atraumatic.      Mouth/Throat:      Mouth: Mucous membranes are dry.   Eyes:      Extraocular Movements: Extraocular movements intact.      Pupils: Pupils are equal, round, and reactive to light.   Neck:      Vascular: No JVD.   Cardiovascular:      Rate and Rhythm: Bradycardia present.      Pulses: Normal pulses.      Heart sounds: Murmur (ejection systolic) heard.      Comments: Remains in  CHB  Pulmonary:      Effort: Pulmonary effort is normal.      Breath sounds: Normal breath sounds. No wheezing, rhonchi or rales.   Abdominal:      General: Bowel sounds are normal. There is no distension.      Palpations: Abdomen is soft.      Tenderness: There is no abdominal tenderness.   Musculoskeletal:         General: No tenderness.      Right lower leg: No edema.      Left lower leg: No edema.      Comments: Uremic flap  BL heel wounds, dressed and wrapped   Skin:     General: Skin is warm.      Capillary Refill: Capillary refill takes less than 2 seconds.      Findings: No erythema or rash.   Neurological:      Mental Status: He is alert. He is disoriented.            Significant Labs: Blood Culture:   Recent Labs   Lab 12/30/23  0507 12/30/23  0513   LABBLOO No Growth to date No Growth to date   , CMP   Recent Labs   Lab 12/28/23  1531 12/29/23  0446 12/30/23  0406   * 133* 133*   K 3.6 3.4* 3.4*   CL 87* 90* 95   CO2 23 24 20*   * 109 79   * 169* 119*   CREATININE 7.5* 7.7* 6.3*   CALCIUM 8.5* 8.3* 8.2*   PROT 6.6 6.4 6.5   ALBUMIN 1.9* 2.0* 1.9*   BILITOT 0.6 0.5 0.6   ALKPHOS 91 88 91   AST 74* 63* 61*   * 91* 82*   ANIONGAP 22* 19* 18*   , CBC   Recent Labs   Lab 12/28/23  1531 12/28/23  1538 12/29/23  0446 12/30/23  0406   WBC 10.05  --  10.59 11.42   HGB 7.2*  --  6.4* 8.0*   HCT 22.9*   < > 20.7* 24.9*     --  387 349    < > = values in this interval not displayed.   , and All pertinent lab results from the last 24 hours have been reviewed.    Significant Imaging:  All imaging from past 24 hours has been reviewed

## 2023-12-30 NOTE — ASSESSMENT & PLAN NOTE
Baseline sCr ~ 2.1  sCr 7.5 on admission with . Was in urinary retention relieved with jackson catheter  Nephrology consulted for dialysis, receiving HD for clearance, making urine  Urine was purulent after the jackson insertion  Ucx growing GNR    Plan:  - continue cefepime for UTI, f/u Ucx  - nephrology consulted for recs and HD in the setting of likely uremic encephalopathy, appreciate recs, planning for more HD   - maintain jackson catheter  - monitor UOP  - Avoid nephrotoxins and renally dose meds for GFR listed above.

## 2023-12-30 NOTE — NURSING
Patient arrived to the unit. Telemetry box applied and vital signs documented in flow sheet. Oriented to room, call bell with in reach, bed is in low lock position. De Leon catheter in place. Admit completed, plan of care initiated.

## 2023-12-30 NOTE — HOSPITAL COURSE
Admitted for CHB which EP thought to be 2/2 uremia/ELIZABETH and infection. Has not required pacing and is HDS. Recommended holding AV iker agents. BUN and sCr newly elevated, seen by nephrology who started dialysis for clearance. Tolerated dialysis with plans to further dialyze. UCx growing GNR, on cefepime. Continued daptomycin for his BL heel OM. EP holding on PPM insertion for now, would like to see if he improves with dialysis; likely a better candidate for leadless pacemaker given his multiple infections. Consider ID consult for his multidrug resistant organisms and possible pyelonephritis to ensure good source control. Mildly delirious which could be 2/2 his uremia and infection, further assessing with CT head which was unremarkable. Stepped down to  but came back to CCU after he went into torsades on 12/31. CPR was initiated and Pt regained pulse and consciousness; bradycardic post event with HR ~50. Electrolytes replaced. TVP placed in CCU; spoke with SID who confirmed a DNR status but that she would want a pacemaker placed. EP consulted for PPM insertion. Had another episode of torsades (~ 2 mins) that became CHB and then was paced to NSR. TVP placed. EP planning for micra insertion once on abx for his UTI for 72 hours. Code status will need to be reversed to full for the procedure, attempting to contact jero to further discuss. Palliative care consulted for GOC/ACP. Pt more alerted and oriented, states he would like to proceed with the procedure, Micra placed on 1/5. Suitable for stepdown to hospital medicine.

## 2023-12-30 NOTE — RESIDENT HANDOFF
Handoff     Primary Team: [facility] CCU Room Number: [id number]     Patient Name: Chato Bowie MRN: [id number]     Date of Birth: [id number] Allergies: Patient has no known allergies.     Age: 73 y.o. Admit Date: 12/28/2023     Sex: male  BMI: Body mass index is 24.83 kg/m².     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 On interview patient lethargic and disoriented and poor historian, history obtained via chart review.   In ED pt AF, bradycardic ~40's, RR in the mid 20's, BP ~110/50, satting 100% on 2L NC. Labs significant for Hb 7.2, ANC 8.3, Na 132, Cl 87, HCO3 23, AG 22, , Cr 7.5, phos 8.3, Albumin 1.9, AST 74, , Lipase 146. Patient with urinary retention and jackson placed with purulent 500ml output . EKG with complete heart block. Patient initially admitted to medicine but transferred to CCU given Heart block    Hospital Course: Admitted for CHB which EP thought to be 2/2 uremia/ELIZABETH and infection. Has not required pacing and is HDS. Recommended holding AV iker agents. BUN and sCr newly elevated, seen by nephrology who started dialysis for clearance. Tolerated dialysis with plans to further dialyze. UCx growing GNR, on cefepime. Continued daptomycin for his BL heel OM. EP holding on PPM insertion for now, would like to see if he improves with dialysis; likely a better candidate for leadless pacemaker given his multiple infections. Consider ID consult for his multidrug resistant organisms and possible pyelonephritis to ensure good source control. Mildly delirious  which could be 2/2 his uremia and infection, further assessing with CT head.      Tasks: F/u EP recs, f/u UCx, consult ID    Contingency Plan: Contact CCU/cards with any questions/concerns     Estimated Discharge Date: TBD    Discharge Disposition: Nursing Facility

## 2023-12-30 NOTE — SUBJECTIVE & OBJECTIVE
Interval History: Got HD yesterday for 2 hours. Still altered, unsure if secondary to uremia vs infx. Persists in CHB, rates mid 40s.    Review of Systems   Unable to perform ROS: Mental status change     Objective:     Vital Signs (Most Recent):  Temp: 98.7 °F (37.1 °C) (12/30/23 0705)  Pulse: (!) 43 (12/30/23 1101)  Resp: 20 (12/30/23 1101)  BP: (!) 141/64 (12/30/23 1101)  SpO2: 100 % (12/30/23 1101) Vital Signs (24h Range):  Temp:  [98.4 °F (36.9 °C)-98.7 °F (37.1 °C)] 98.7 °F (37.1 °C)  Pulse:  [43-65] 43  Resp:  [13-34] 20  SpO2:  [94 %-100 %] 100 %  BP: (105-175)/(56-76) 141/64     Weight: 80.7 kg (178 lb)  Body mass index is 24.83 kg/m².     SpO2: 100 %        Physical Exam  Constitutional:       General: He is not in acute distress.     Appearance: He is ill-appearing.   HENT:      Mouth/Throat:      Mouth: Mucous membranes are dry.   Cardiovascular:      Rate and Rhythm: Bradycardia present.      Heart sounds: Murmur heard.   Pulmonary:      Effort: Pulmonary effort is normal.      Breath sounds: Normal breath sounds.   Musculoskeletal:      Right lower leg: No edema.      Left lower leg: No edema.   Skin:     General: Skin is warm.   Neurological:      Mental Status: He is disoriented.            Significant Labs: All pertinent lab results from the last 24 hours have been reviewed.    Significant Imaging:  Reviewed

## 2023-12-30 NOTE — ASSESSMENT & PLAN NOTE
Patient with complete heart block, previous EKG with first degree AV block and LBBB  Patient on metoprolol and amiodarone as an outpatient  HR in the 30s on admission, has improved to ~ 45  Hemodynamically stable, he is a poor historian but denies any syncopal episodes.  EP consulted, hold of TVP for now  - Pads on patient and atropine at bedside   - Hold AV iker agents  - EP following, thinks it could be 2/2 uremia/ELIZABETH/infection. No plans for PPM currently, would like to see how he improves with dialysis. May need leadless PPM insertion

## 2023-12-30 NOTE — SUBJECTIVE & OBJECTIVE
Interval History: Patient seen this morning, no acute distress. Tolerated HD well yesterday.     Review of patient's allergies indicates:  No Known Allergies  Current Facility-Administered Medications   Medication Frequency    0.9%  NaCl infusion (for blood administration) Q24H PRN    0.9%  NaCl infusion Once    0.9%  NaCl infusion Continuous    0.9%  NaCl infusion PRN    0.9%  NaCl infusion Once    acetaminophen tablet 650 mg Q4H PRN    allopurinol split tablet 50 mg Daily    atorvastatin tablet 80 mg Daily    ceFEPIme (MAXIPIME) 2 g in dextrose 5 % in water (D5W) 100 mL IVPB (MB+) Q24H    DAPTOmycin (CUBICIN) 650 mg in sodium chloride 0.9% SolP 50 mL IVPB Q48H    dextrose 10% bolus 125 mL 125 mL PRN    dextrose 10% bolus 250 mL 250 mL PRN    famotidine tablet 20 mg Daily    folic acid tablet 1 mg Daily    glucagon (human recombinant) injection 1 mg PRN    glucose chewable tablet 16 g PRN    glucose chewable tablet 24 g PRN    heparin (porcine) injection 1,000 Units PRN    heparin (porcine) injection 5,000 Units Q8H    insulin aspart U-100 pen 0-5 Units QID (AC + HS) PRN    magnesium oxide tablet 800 mg PRN    naloxone 0.4 mg/mL injection 0.02 mg PRN    ondansetron disintegrating tablet 8 mg Q8H PRN    potassium bicarbonate disintegrating tablet 35 mEq PRN    potassium, sodium phosphates 280-160-250 mg packet 2 packet PRN    senna-docusate 8.6-50 mg per tablet 2 tablet BID PRN    sodium chloride 0.9% bolus 250 mL 250 mL PRN    sodium chloride 0.9% flush 10 mL Q12H PRN       Objective:     Vital Signs (Most Recent):  Temp: 97.7 °F (36.5 °C) (12/30/23 1101)  Pulse: (!) 40 (12/30/23 1400)  Resp: 16 (12/30/23 1400)  BP: (!) 143/65 (12/30/23 1400)  SpO2: 99 % (12/30/23 1400) Vital Signs (24h Range):  Temp:  [97.7 °F (36.5 °C)-98.7 °F (37.1 °C)] 97.7 °F (36.5 °C)  Pulse:  [40-65] 40  Resp:  [13-31] 16  SpO2:  [94 %-100 %] 99 %  BP: (105-175)/(56-76) 143/65     Weight: 80.7 kg (178 lb) (12/29/23 0703)  Body mass index is  24.83 kg/m².  Body surface area is 2.01 meters squared.    I/O last 3 completed shifts:  In: 1820.3 [P.O.:240; I.V.:41; Blood:800; Other:400; IV Piggyback:339.2]  Out: 830 [Urine:430; Other:400]     Physical Exam  Vitals and nursing note reviewed.   Constitutional:       General: He is sleeping. He is not in acute distress.     Appearance: He is normal weight. He is ill-appearing. He is not toxic-appearing.      Interventions: Nasal cannula in place.   HENT:      Head: Normocephalic and atraumatic.      Nose: Nose normal.      Mouth/Throat:      Mouth: Mucous membranes are dry.      Pharynx: No oropharyngeal exudate.   Eyes:      General: No scleral icterus.        Right eye: No discharge.         Left eye: No discharge.      Pupils: Pupils are equal, round, and reactive to light.   Cardiovascular:      Rate and Rhythm: Bradycardia present.   Pulmonary:      Effort: Pulmonary effort is normal. No respiratory distress.      Breath sounds: No wheezing or rales.   Abdominal:      General: Abdomen is flat.      Tenderness: There is abdominal tenderness (bilateral flank).   Musculoskeletal:      Cervical back: Normal range of motion. No rigidity.      Comments: Bilateral heels wrapped in ACE bandages.    Lymphadenopathy:      Cervical: No cervical adenopathy.   Neurological:      Mental Status: He is easily aroused.      Comments: Difficult to understand, however is following commands. Alert to name.          Significant Labs:  CBC:   Recent Labs   Lab 12/30/23  0406   WBC 11.42   RBC 3.04*   HGB 8.0*   HCT 24.9*      MCV 82   MCH 26.3*   MCHC 32.1       CMP:   Recent Labs   Lab 12/30/23  0406   GLU 79   CALCIUM 8.2*   ALBUMIN 1.9*   PROT 6.5   *   K 3.4*   CO2 20*   CL 95   *   CREATININE 6.3*   ALKPHOS 91   ALT 82*   AST 61*   BILITOT 0.6       All labs within the past 24 hours have been reviewed.

## 2023-12-30 NOTE — PROGRESS NOTES
Pharmacist Renal Dose Adjustment Note    Chato Bowie is a 73 y.o. male being treated with the medication Cefepime     Patient Data:    Vital Signs (Most Recent):  Temp: 98.5 °F (36.9 °C) (12/30/23 0001)  Pulse: (!) 45 (12/30/23 0600)  Resp: 19 (12/30/23 0600)  BP: 129/61 (12/30/23 0600)  SpO2: 99 % (12/30/23 0600) Vital Signs (72h Range):  Temp:  [97.7 °F (36.5 °C)-98.6 °F (37 °C)]   Pulse:  [42-65]   Resp:  [13-34]   BP: (105-175)/(51-76)   SpO2:  [91 %-100 %]      Recent Labs   Lab 12/28/23  1531 12/29/23  0446 12/30/23  0406   CREATININE 7.5* 7.7* 6.3*     Serum creatinine: 6.3 mg/dL (H) 12/30/23 0406  Estimated creatinine clearance: 11.1 mL/min (A)    Cefepime 2g Q12h will be reduced to 2g Q24.     Pharmacist's Name: Barber Melendez  Pharmacist's Extension: [phone]

## 2023-12-30 NOTE — NURSING TRANSFER
Nursing Transfer Note      12/30/2023   5:07 PM    Nurse giving handoff: Alisson  Nurse receiving handoff: James    Reason patient is being transferred: stepdown    Transfer To: [id number]    Transfer via bed    Transfer with cardiac monitoring    Transported by Alisson Rn and Naun RN    Telemetry: Box Number [id number]  Order for Tele Monitor? Yes    Additional Lines: De Leon Catheter    4eyes on Skin: yes    Medicines sent: n/a    Any special needs or follow-up needed: dialysis    Patient belongings transferred with patient:  Pt only had blanket with him    Chart send with patient: Yes    Notified: spoke with LEXUS Abraham    Upon arrival to floor: cardiac monitor applied, patient oriented to room, call bell in reach, and bed in lowest position

## 2023-12-31 PROBLEM — N17.9 ACUTE KIDNEY INJURY SUPERIMPOSED ON CKD: Status: RESOLVED | Noted: 2023-12-28 | Resolved: 2023-12-31

## 2023-12-31 PROBLEM — N18.9 ACUTE KIDNEY INJURY SUPERIMPOSED ON CKD: Status: RESOLVED | Noted: 2023-12-28 | Resolved: 2023-12-31

## 2023-12-31 PROBLEM — N17.9 ACUTE RENAL FAILURE SUPERIMPOSED ON STAGE 3B CHRONIC KIDNEY DISEASE: Status: ACTIVE | Noted: 2020-11-24

## 2023-12-31 LAB
ALBUMIN SERPL BCP-MCNC: 1.9 G/DL (ref 3.5–5.2)
ALP SERPL-CCNC: 100 U/L (ref 55–135)
ALT SERPL W/O P-5'-P-CCNC: 79 U/L (ref 10–44)
ANION GAP SERPL CALC-SCNC: 14 MMOL/L (ref 8–16)
AST SERPL-CCNC: 55 U/L (ref 10–40)
BASOPHILS # BLD AUTO: 0.04 K/UL (ref 0–0.2)
BASOPHILS NFR BLD: 0.5 % (ref 0–1.9)
BILIRUB SERPL-MCNC: 0.6 MG/DL (ref 0.1–1)
BUN SERPL-MCNC: 73 MG/DL (ref 8–23)
CALCIUM SERPL-MCNC: 8.6 MG/DL (ref 8.7–10.5)
CHLORIDE SERPL-SCNC: 99 MMOL/L (ref 95–110)
CO2 SERPL-SCNC: 24 MMOL/L (ref 23–29)
CREAT SERPL-MCNC: 4.4 MG/DL (ref 0.5–1.4)
DIFFERENTIAL METHOD BLD: ABNORMAL
EOSINOPHIL # BLD AUTO: 0.2 K/UL (ref 0–0.5)
EOSINOPHIL NFR BLD: 2.1 % (ref 0–8)
ERYTHROCYTE [DISTWIDTH] IN BLOOD BY AUTOMATED COUNT: 17.9 % (ref 11.5–14.5)
EST. GFR  (NO RACE VARIABLE): 13.4 ML/MIN/1.73 M^2
GLUCOSE SERPL-MCNC: 93 MG/DL (ref 70–110)
HBV SURFACE AG SERPL QL IA: NORMAL
HCT VFR BLD AUTO: 25.2 % (ref 40–54)
HGB BLD-MCNC: 8.1 G/DL (ref 14–18)
IMM GRANULOCYTES # BLD AUTO: 0.06 K/UL (ref 0–0.04)
IMM GRANULOCYTES NFR BLD AUTO: 0.7 % (ref 0–0.5)
LYMPHOCYTES # BLD AUTO: 0.7 K/UL (ref 1–4.8)
LYMPHOCYTES NFR BLD: 7.3 % (ref 18–48)
MAGNESIUM SERPL-MCNC: 2.2 MG/DL (ref 1.6–2.6)
MCH RBC QN AUTO: 26 PG (ref 27–31)
MCHC RBC AUTO-ENTMCNC: 32.1 G/DL (ref 32–36)
MCV RBC AUTO: 81 FL (ref 82–98)
MONOCYTES # BLD AUTO: 0.9 K/UL (ref 0.3–1)
MONOCYTES NFR BLD: 10.2 % (ref 4–15)
NEUTROPHILS # BLD AUTO: 7 K/UL (ref 1.8–7.7)
NEUTROPHILS NFR BLD: 79.2 % (ref 38–73)
NRBC BLD-RTO: 0 /100 WBC
PHOSPHATE SERPL-MCNC: 3.9 MG/DL (ref 2.7–4.5)
PLATELET # BLD AUTO: 340 K/UL (ref 150–450)
PMV BLD AUTO: 9 FL (ref 9.2–12.9)
POCT GLUCOSE: 102 MG/DL (ref 70–110)
POTASSIUM SERPL-SCNC: 3.5 MMOL/L (ref 3.5–5.1)
PROT SERPL-MCNC: 6.6 G/DL (ref 6–8.4)
RBC # BLD AUTO: 3.11 M/UL (ref 4.6–6.2)
SODIUM SERPL-SCNC: 137 MMOL/L (ref 136–145)
WBC # BLD AUTO: 8.88 K/UL (ref 3.9–12.7)

## 2023-12-31 PROCEDURE — 25000003 PHARM REV CODE 250: Performed by: STUDENT IN AN ORGANIZED HEALTH CARE EDUCATION/TRAINING PROGRAM

## 2023-12-31 PROCEDURE — 99223 1ST HOSP IP/OBS HIGH 75: CPT | Mod: ,,, | Performed by: PHYSICIAN ASSISTANT

## 2023-12-31 PROCEDURE — 84100 ASSAY OF PHOSPHORUS: CPT | Performed by: INTERNAL MEDICINE

## 2023-12-31 PROCEDURE — 85025 COMPLETE CBC W/AUTO DIFF WBC: CPT | Performed by: INTERNAL MEDICINE

## 2023-12-31 PROCEDURE — 25000003 PHARM REV CODE 250: Performed by: INTERNAL MEDICINE

## 2023-12-31 PROCEDURE — 63600175 PHARM REV CODE 636 W HCPCS: Performed by: PHYSICIAN ASSISTANT

## 2023-12-31 PROCEDURE — 63600175 PHARM REV CODE 636 W HCPCS

## 2023-12-31 PROCEDURE — 25000003 PHARM REV CODE 250

## 2023-12-31 PROCEDURE — 20600001 HC STEP DOWN PRIVATE ROOM

## 2023-12-31 PROCEDURE — 25000003 PHARM REV CODE 250: Performed by: PHYSICIAN ASSISTANT

## 2023-12-31 PROCEDURE — 80053 COMPREHEN METABOLIC PANEL: CPT | Performed by: INTERNAL MEDICINE

## 2023-12-31 PROCEDURE — 87340 HEPATITIS B SURFACE AG IA: CPT | Performed by: STUDENT IN AN ORGANIZED HEALTH CARE EDUCATION/TRAINING PROGRAM

## 2023-12-31 PROCEDURE — 99232 SBSQ HOSP IP/OBS MODERATE 35: CPT | Mod: ,,, | Performed by: INTERNAL MEDICINE

## 2023-12-31 PROCEDURE — 83735 ASSAY OF MAGNESIUM: CPT | Performed by: INTERNAL MEDICINE

## 2023-12-31 RX ORDER — MEROPENEM AND SODIUM CHLORIDE 500 MG/50ML
500 INJECTION, SOLUTION INTRAVENOUS
Status: DISCONTINUED | OUTPATIENT
Start: 2023-12-31 | End: 2023-12-31

## 2023-12-31 RX ADMIN — ATORVASTATIN CALCIUM 80 MG: 40 TABLET, FILM COATED ORAL at 09:12

## 2023-12-31 RX ADMIN — Medication 800 MG: at 05:12

## 2023-12-31 RX ADMIN — ALLOPURINOL 50 MG: 300 TABLET ORAL at 09:12

## 2023-12-31 RX ADMIN — HEPARIN SODIUM 5000 UNITS: 5000 INJECTION INTRAVENOUS; SUBCUTANEOUS at 09:12

## 2023-12-31 RX ADMIN — ERTAPENEM 500 MG: 1 INJECTION INTRAMUSCULAR; INTRAVENOUS at 01:12

## 2023-12-31 RX ADMIN — HEPARIN SODIUM 5000 UNITS: 5000 INJECTION INTRAVENOUS; SUBCUTANEOUS at 05:12

## 2023-12-31 RX ADMIN — ACETAMINOPHEN 1000 MG: 500 TABLET ORAL at 03:12

## 2023-12-31 RX ADMIN — LIDOCAINE 5% 1 PATCH: 700 PATCH TOPICAL at 07:12

## 2023-12-31 RX ADMIN — ACETAMINOPHEN 1000 MG: 500 TABLET ORAL at 09:12

## 2023-12-31 RX ADMIN — POTASSIUM BICARBONATE 50 MEQ: 978 TABLET, EFFERVESCENT ORAL at 09:12

## 2023-12-31 RX ADMIN — FOLIC ACID 1 MG: 1 TABLET ORAL at 09:12

## 2023-12-31 RX ADMIN — HEPARIN SODIUM 5000 UNITS: 5000 INJECTION INTRAVENOUS; SUBCUTANEOUS at 03:12

## 2023-12-31 RX ADMIN — FAMOTIDINE 20 MG: 20 TABLET, FILM COATED ORAL at 09:12

## 2023-12-31 NOTE — PROGRESS NOTES
Hemodialysis treatment completed. Blood returned. Dialyzed patient for 2.5 hours for clearance with zero net fluid removal as ordered. Patient tolerated treatment well.    R IJ temporary CVC flushed with saline and locked with heparin. Lumens capped and wrapped in sterile gauze.     Report given to primary nurse.

## 2023-12-31 NOTE — SUBJECTIVE & OBJECTIVE
Interval History: Still getting hemodialysis for clearance for uremia. His mentation is much improved this morning from yesterday but still delirious and unable to have a discussion. On tele he is in CHB, rates in the 40s. He is hemodynamically stable.    Review of Systems   Unable to perform ROS: Mental status change     Objective:     Vital Signs (Most Recent):  Temp: 98.7 °F (37.1 °C) (12/31/23 0750)  Pulse: (!) 48 (12/31/23 0750)  Resp: 16 (12/31/23 0750)  BP: (!) 140/61 (12/31/23 0750)  SpO2: 95 % (12/31/23 0750) Vital Signs (24h Range):  Temp:  [97.6 °F (36.4 °C)-98.7 °F (37.1 °C)] 98.7 °F (37.1 °C)  Pulse:  [39-64] 48  Resp:  [13-25] 16  SpO2:  [95 %-100 %] 95 %  BP: (122-159)/(58-94) 140/61     Weight: 80.7 kg (178 lb)  Body mass index is 24.83 kg/m².     SpO2: 95 %        Physical Exam  Constitutional:       General: He is not in acute distress.     Appearance: He is ill-appearing.   HENT:      Mouth/Throat:      Mouth: Mucous membranes are dry.   Cardiovascular:      Rate and Rhythm: Bradycardia present.      Heart sounds: Murmur heard.   Pulmonary:      Effort: Pulmonary effort is normal.      Breath sounds: Normal breath sounds.   Musculoskeletal:      Right lower leg: No edema.      Left lower leg: No edema.   Skin:     General: Skin is warm.   Neurological:      Mental Status: He is disoriented.            Significant Labs: All pertinent lab results from the last 24 hours have been reviewed.    Significant Imaging:  Reviewed

## 2023-12-31 NOTE — ASSESSMENT & PLAN NOTE
Nutrition consulted. Most recent weight and BMI pending    Measurements:  Wt Readings from Last 1 Encounters:   12/29/23 80.7 kg (178 lb)   Body mass index is 24.83 kg/m².    Patient will been screened and assessed by RD.    Malnutrition Type:  Context: chronic illness  Level: severe    Malnutrition Characteristic Summary:  Weight Loss (Malnutrition): other (see comments) (15% x 3 months)  Energy Intake (Malnutrition): less than 75% for greater than or equal to 3 months  Subcutaneous Fat (Malnutrition): mild depletion  Muscle Mass (Malnutrition): moderate depletion    Interventions/Recommendations (treatment strategy):  1. Rec'd Diabetic/Renal diet  2. If PO intake <50%, add Novasource renal BID  3. RD to monitor and follow

## 2023-12-31 NOTE — ASSESSMENT & PLAN NOTE
- on admission  - h/o 1st deg AV block and LBBB, on metoprolol and amio on outpatient basis  - EP following- holding on TVP for now- will await improvement of infection prior to placement of leadless pacemaker  - avoid AV iker blocking agents (I.e., CCB, Beta blockers, etc).   - etiology could also be uremia/caitlin/infxn  - tele

## 2023-12-31 NOTE — ASSESSMENT & PLAN NOTE
Patient's FSGs are controlled on current medication regimen.  Last A1c reviewed-   Lab Results   Component Value Date    HGBA1C 5.5 09/20/2023     Most recent fingerstick glucose reviewed-   Recent Labs   Lab 12/30/23  1536 12/30/23  2234   POCTGLUCOSE 114* 131*     Current correctional scale  Low  Maintain anti-hyperglycemic dose as follows-   Antihyperglycemics (From admission, onward)    Start     Stop Route Frequency Ordered    12/28/23 1818  insulin aspart U-100 pen 0-5 Units         -- SubQ Before meals & nightly PRN 12/28/23 1719        Hold Oral hypoglycemics while patient is in the hospital.

## 2023-12-31 NOTE — PLAN OF CARE
Problem: Infection  Goal: Absence of Infection Signs and Symptoms  Outcome: Ongoing, Progressing     Problem: Adult Inpatient Plan of Care  Goal: Plan of Care Review  Outcome: Ongoing, Progressing  Goal: Absence of Hospital-Acquired Illness or Injury  Outcome: Ongoing, Progressing  Goal: Optimal Comfort and Wellbeing  Outcome: Ongoing, Progressing  Goal: Readiness for Transition of Care  Outcome: Ongoing, Progressing     Problem: Device-Related Complication Risk (Hemodialysis)  Goal: Safe, Effective Therapy Delivery  Outcome: Ongoing, Progressing     Problem: Infection (Hemodialysis)  Goal: Absence of Infection Signs and Symptoms  Outcome: Ongoing, Progressing     Problem: Diabetes Comorbidity  Goal: Blood Glucose Level Within Targeted Range  Outcome: Ongoing, Progressing     Problem: Fluid and Electrolyte Imbalance (Acute Kidney Injury/Impairment)  Goal: Fluid and Electrolyte Balance  Outcome: Ongoing, Progressing

## 2023-12-31 NOTE — PLAN OF CARE
Recommendations    1. Rec'd Diabetic/Renal diet      2. Add Novasource renal BID      3. RD to monitor and follow    Goals: Meet % EEN, EPN by RD f/u  Nutrition Goal Status: new  Communication of RD Recs:  (POC)

## 2023-12-31 NOTE — ASSESSMENT & PLAN NOTE
- CT A/P w/cholelithiasis- no evidence of cholecystitis, anasarca present  - n/v/ap could be 2/2 fluid-overloaded state, UTI, etc  - improved

## 2023-12-31 NOTE — ASSESSMENT & PLAN NOTE
- tte/ 12/29  Summary         Left Ventricle: The left ventricle is normal in size. Ventricular mass is normal. Normal wall thickness. There is concentric remodeling. Septal motion is consistent with bundle branch block. There is normal systolic function with a visually estimated ejection fraction of 55 - 60%. Grade II diastolic dysfunction.    Right Ventricle: Normal right ventricular cavity size. Systolic function is normal.    Left Atrium: Left atrium is severely dilated.    Aortic Valve: There is moderate aortic valve sclerosis. Moderately restricted motion. There is severe stenosis. Aortic valve area by VTI is 0.86 cm². Aortic valve peak velocity is 4.12 m/s. Mean gradient is 43 mmHg. The dimensionless index is 0.22. There is mild to moderate aortic regurgitation.    Mitral Valve: There is bileaflet sclerosis. There is mild annular dilation present.    Tricuspid Valve: There is mild to moderate regurgitation.    Pulmonary Artery: The estimated pulmonary artery systolic pressure is 72 mmHg.    IVC/SVC: Intermediate venous pressure at 8 mmHg.    - outpatient f/u with interventional cardiology

## 2023-12-31 NOTE — ASSESSMENT & PLAN NOTE
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

## 2023-12-31 NOTE — NURSING
Nurses Note -- 4 Eyes      12/30/2023   6:45 PM      Skin assessed during: Q Shift Change      [] No Altered Skin Integrity Present    [x]Prevention Measures Documented      [x] Yes- Altered Skin Integrity Present or Discovered   [] LDA Added if Not in Epic (Describe Wound)   [] New Altered Skin Integrity was Present on Admit and Documented in LDA   [x] Wound Image Taken    Wound Care Consulted? Yes    Attending Nurse:  Pat Rodríguez RN    Second RN/Staff Member:  Eliana Rey RN

## 2023-12-31 NOTE — ASSESSMENT & PLAN NOTE
72 y/o male with DMII, aortic stenosis, CHF, HTN, recently admitted for MRSA osteomyelitis of hhis left heel s/p I&D 12/1/23 on 6 weeks of IV dapto EOC 1/10/23. He presented to ED from NH for bradycardia, weakness and ELIZABETH creatinine 7.5 (baseline 2.4). Found to have complete heart block, urinary retention with jackson placement with purulent output. Pt started on HD via RIJ. ID consulted for ESBL E.coli UTI and heel OM. Per EP, plans to place leadless PM once infection and mentation clears.    Recommendations  D/c meropenem. Start ertapenem for ESBL E.coli. anticipate 7 days  Recommend podiatry f/u for heel OM   Continue daptomycin as anticipated EOC 1/10/24  Weekly CPK while on daptomycin  Blood cultures remain NGTD     Outpatient Antibiotic Therapy Plan:    Please send referral to [facility].    1) Infection:  OM MRSA and ESBL E.coli UTI     2) Discharge Antibiotics:    Intravenous antibiotics:  Daptomycin  q48hr if plans for HD outpatient or CrCL>30 daptomycin q24 if CrcL>30 EOC 1/10/24   Ertapenem 500 mg daily x 7 days EOC  1/7/24      4) Outpatient Weekly Labs:    Order the following labs to be drawn on Mondays:   CBC  CMP   CRP    CPK (when on Daptomycin)  Vancomycin trough. Target 15-20      5) Fax Lab Results to Infectious Diseases Provider: jaek davison    [facility] Fax Number: [phone]    6) Outpatient Infectious Diseases Follow-up    Follow-up appointment will be arranged by the ID clinic and will be found in the patient's appointments tab.    Prior to discharge, please ensure the patient's follow-up has been scheduled.    If there is still no follow-up scheduled prior to discharge, please send an EPIC message to Mandi Brito in Infectious Diseases.

## 2023-12-31 NOTE — ASSESSMENT & PLAN NOTE
Patient with acute kidney injury/acute renal failure likely due to pre-renal azotemia due to dehydration ELIZABETH is currently  trending, Cr on admission 7.5 from 2.5 two weeks ago . Baseline creatinine  ~2.1  - Labs reviewed- Renal function/electrolytes with Estimated Creatinine Clearance: 15.9 mL/min (A) (based on SCr of 4.4 mg/dL (H)). according to latest data. Monitor urine output and serial BMP and adjust therapy as needed. Avoid nephrotoxins and renally dose meds for GFR listed above.    Nephrology consulted for recs and HD in the setting of likely uremic encephalopathy

## 2023-12-31 NOTE — PROGRESS NOTES
[facility] - Cardiology Stepdown  Cardiac Electrophysiology  Progress Note    Admission Date: 12/28/2023  Code Status: Full Code   Attending Physician: Gabbi Small MD   Expected Discharge Date: 1/5/2024  Principal Problem:Complete heart block    Subjective:     Interval History: Still getting hemodialysis for clearance for uremia. His mentation is much improved this morning from yesterday but still delirious and unable to have a discussion. On tele he is in CHB, rates in the 40s. He is hemodynamically stable.    Review of Systems   Unable to perform ROS: Mental status change     Objective:     Vital Signs (Most Recent):  Temp: 98.7 °F (37.1 °C) (12/31/23 0750)  Pulse: (!) 48 (12/31/23 0750)  Resp: 16 (12/31/23 0750)  BP: (!) 140/61 (12/31/23 0750)  SpO2: 95 % (12/31/23 0750) Vital Signs (24h Range):  Temp:  [97.6 °F (36.4 °C)-98.7 °F (37.1 °C)] 98.7 °F (37.1 °C)  Pulse:  [39-64] 48  Resp:  [13-25] 16  SpO2:  [95 %-100 %] 95 %  BP: (122-159)/(58-94) 140/61     Weight: 80.7 kg (178 lb)  Body mass index is 24.83 kg/m².     SpO2: 95 %        Physical Exam  Constitutional:       General: He is not in acute distress.     Appearance: He is ill-appearing.   HENT:      Mouth/Throat:      Mouth: Mucous membranes are dry.   Cardiovascular:      Rate and Rhythm: Bradycardia present.      Heart sounds: Murmur heard.   Pulmonary:      Effort: Pulmonary effort is normal.      Breath sounds: Normal breath sounds.   Musculoskeletal:      Right lower leg: No edema.      Left lower leg: No edema.   Skin:     General: Skin is warm.   Neurological:      Mental Status: He is disoriented.            Significant Labs: All pertinent lab results from the last 24 hours have been reviewed.    Significant Imaging:  Reviewed  Assessment and Plan:     * Complete heart block  Patient with complete heart block, Previous EKG with first degree AV block and LBBB. Patient on metoprolol and amiodarone as an outpatient  Patient hemodynamically  stable, but is unable to give a reliable history    Recommendations:  -Hold AV iker agents  -No need for TVP at this time  -Would consider ID consultation. In the setting of his multidrug resistant infecitons and possibly pyelonephritis, would like to have good source control prior to device placement  -Can plan for leadless pacemaker when his infection clears (likely 48-72 hours on appropriate antimicrobial coverage)        Connor M Gillies, MD  Cardiac Electrophysiology  [facility] - Cardiology Stepdown

## 2023-12-31 NOTE — SUBJECTIVE & OBJECTIVE
Interval History: Improvement in back pain this am. Still with some confusion- thinks he is at his house. No nausea, vomiting, fevers, chills, night sweats, abd pain- limited ROS.     Objective:     Vital Signs (Most Recent):  Temp: 97.8 °F (36.6 °C) (12/31/23 1158)  Pulse: (!) 49 (12/31/23 1158)  Resp: 16 (12/31/23 1158)  BP: (!) 153/68 (12/31/23 1158)  SpO2: 99 % (12/31/23 1158) Vital Signs (24h Range):  Temp:  [97.6 °F (36.4 °C)-98.7 °F (37.1 °C)] 97.8 °F (36.6 °C)  Pulse:  [39-64] 49  Resp:  [16-22] 16  SpO2:  [95 %-100 %] 99 %  BP: (122-159)/(58-94) 153/68     Weight: 80.7 kg (178 lb)  Body mass index is 24.83 kg/m².    Intake/Output Summary (Last 24 hours) at 12/31/2023 1338  Last data filed at 12/30/2023 2317  Gross per 24 hour   Intake 1022.19 ml   Output 540 ml   Net 482.19 ml      Physical Exam  Gen: in NAD, appears stated age, appears acutely ill  Neuro: awake, not oriented to time or place, motor, sensory, and strength grossly intact BL  HEENT: NTNC, EOMI, PERRL, MMM, muddy sclera  CVS: bradycardic, murmur, no rub or gallop,  S1/S2 auscultated with no S3 or S4; capillary refill < 2 sec, CVL of the Rt IJ  Resp: lungs CTAB, no w/r/r; no belabored breathing or accessory muscle use appreciated   Abd: BS+ in all 4 quadrants; NTND, soft to palpation; no organomegaly appreciated   Extrem: pulses full, equal, and regular over all 4 extremities; no UE or LE edema BL, BL feet wrapped in guaze- h/o osteo    Significant Labs: All pertinent labs within the past 24 hours have been reviewed.  CBC:   Recent Labs   Lab 12/30/23  0406 12/31/23  0404   WBC 11.42 8.88   HGB 8.0* 8.1*   HCT 24.9* 25.2*    340     CMP:   Recent Labs   Lab 12/30/23  0406 12/30/23  1537 12/31/23  0404   *  --  137   K 3.4* 3.4* 3.5   CL 95  --  99   CO2 20*  --  24   GLU 79  --  93   *  --  73*   CREATININE 6.3*  --  4.4*   CALCIUM 8.2*  --  8.6*   PROT 6.5  --  6.6   ALBUMIN 1.9*  --  1.9*   BILITOT 0.6  --  0.6   ALKPHOS  "91  --  100   AST 61*  --  55*   ALT 82*  --  79*   ANIONGAP 18*  --  14     Cardiac Markers: No results for input(s): "CKMB", "MYOGLOBIN", "BNP", "TROPISTAT" in the last 48 hours.  POCT Glucose:   Recent Labs   Lab 12/30/23  1536 12/30/23  2234   POCTGLUCOSE 114* 131*     Troponin: No results for input(s): "TROPONINI", "TROPONINIHS" in the last 48 hours.  Urine Culture: No results for input(s): "LABURIN" in the last 48 hours.  Urine Studies: No results for input(s): "COLORU", "APPEARANCEUA", "PHUR", "SPECGRAV", "PROTEINUA", "GLUCUA", "KETONESU", "BILIRUBINUA", "OCCULTUA", "NITRITE", "UROBILINOGEN", "LEUKOCYTESUR", "RBCUA", "WBCUA", "BACTERIA", "SQUAMEPITHEL", "HYALINECASTS" in the last 48 hours.    Invalid input(s): "WRIGHTSUR"    Significant Imaging: I have reviewed all pertinent imaging results/findings within the past 24 hours.  "

## 2023-12-31 NOTE — HPI
Mr. 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 He was discharged with RUE picc line for long term IV abx therapy. He presented to ED from NH for chest pain, flank pain, found to have ELIZABETH creatinine 7.5 (baseline). Found to have complete heart block, followed by EP here. He required hemodialysis, had jackson placement for urinary retention, noted to have 500 cc purulent urine. Urine cultures here +ESBL E.coli. ID consulted for abx recommendations. Per EP, plans to place leadless PM once infection and mentation clears. Had RIJ in place for HD. He remains afebrile, stable, no leukocytosis. Mentation waxes and wanes.

## 2023-12-31 NOTE — PROGRESS NOTES
[facility] - Cardiology [facility] Medicine  Progress Note    Patient Name: Chato Bowie  MRN: [id number]  Patient Class: IP- Inpatient   Admission Date: 12/28/2023  Length of Stay: 3 days  Attending Physician: Gabbi Small MD  Primary Care Provider: Irlanda Valenzuela -        Subjective:     Principal Problem:Complete heart block        HPI:  73yoM w/PMHx of T2DM, HTN, HLD, CHF, CAD, HLD, CKD 3b, alcohol use disorder, and multiple thyroid nodules presented from [facility] with RLQ pain, fatigue, bradycardia ~30's, weakness, and lab abnormalities including Cr 7.1 and . 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  On interview patient lethargic and disoriented, history obtained via chart review.    In ED pt AF, bradycardic ~40's, RR in the mid 20's, BP ~110/50, satting 100% on 2L NC. Labs significant for Hb 7.2, ANC 8.3, Na 132, Cl 87, HCO3 23, AG 22, , Cr 7.5, phos 8.3, Albumin 1.9, AST 74, , Lipase 146. UA with 2+ protein, 2+ occult blood, 3+ leukocytes, >100WBC and many bacteria. EKG showed wide QRS with LBBB, vent rate 45, QT/QTc 652/563. CXR showed mild retraction of R-sided PICC into mid SVC. CT A&P in progress. In ED jackson placed, started on CTX, given fluids, nephrology consulted. Admitting to 1 for further management.     Overview/Hospital Course:   Course:  Patient initially admitted to medicine but transferred to CCU given Heart block     CCU Course:  Admitted for CHB which EP thought to be 2/2 uremia/ELIZABETH and infection. Has not required pacing and is HDS. Recommended holding AV iker agents. BUN and sCr newly elevated, seen by nephrology who started dialysis for clearance. Tolerated dialysis with plans to further dialyze. UCx growing GNR, on cefepime. Continued daptomycin for his BL heel OM. EP holding on PPM  insertion for now, would like to see if he improves with dialysis; likely a better candidate for leadless pacemaker given his multiple infections. Consider ID consult for his multidrug resistant organisms and possible pyelonephritis to ensure good source control. Mildly delirious which could be 2/2 his uremia and infection, further assessing with CT head.  Deemed stable for stepdown to  PM of 12/30.      Course:  Patient was found to have ESBL e coli- ID consulted. Stopped cefepime and started ertapenem.     Interval History: Improvement in back pain this am. Still with some confusion- thinks he is at his house. No nausea, vomiting, fevers, chills, night sweats, abd pain- limited ROS.     Objective:     Vital Signs (Most Recent):  Temp: 97.8 °F (36.6 °C) (12/31/23 1158)  Pulse: (!) 49 (12/31/23 1158)  Resp: 16 (12/31/23 1158)  BP: (!) 153/68 (12/31/23 1158)  SpO2: 99 % (12/31/23 1158) Vital Signs (24h Range):  Temp:  [97.6 °F (36.4 °C)-98.7 °F (37.1 °C)] 97.8 °F (36.6 °C)  Pulse:  [39-64] 49  Resp:  [16-22] 16  SpO2:  [95 %-100 %] 99 %  BP: (122-159)/(58-94) 153/68     Weight: 80.7 kg (178 lb)  Body mass index is 24.83 kg/m².    Intake/Output Summary (Last 24 hours) at 12/31/2023 1338  Last data filed at 12/30/2023 2317  Gross per 24 hour   Intake 1022.19 ml   Output 540 ml   Net 482.19 ml      Physical Exam  Gen: in NAD, appears stated age, appears acutely ill  Neuro: awake, not oriented to time or place, motor, sensory, and strength grossly intact BL  HEENT: NTNC, EOMI, PERRL, MMM, muddy sclera  CVS: bradycardic, murmur, no rub or gallop,  S1/S2 auscultated with no S3 or S4; capillary refill < 2 sec, CVL of the Rt IJ  Resp: lungs CTAB, no w/r/r; no belabored breathing or accessory muscle use appreciated   Abd: BS+ in all 4 quadrants; NTND, soft to palpation; no organomegaly appreciated   Extrem: pulses full, equal, and regular over all 4 extremities; no UE or LE edema BL, BL feet wrapped in guaze- h/o  "osteo    Significant Labs: All pertinent labs within the past 24 hours have been reviewed.  CBC:   Recent Labs   Lab 12/30/23  0406 12/31/23  0404   WBC 11.42 8.88   HGB 8.0* 8.1*   HCT 24.9* 25.2*    340     CMP:   Recent Labs   Lab 12/30/23  0406 12/30/23  1537 12/31/23  0404   *  --  137   K 3.4* 3.4* 3.5   CL 95  --  99   CO2 20*  --  24   GLU 79  --  93   *  --  73*   CREATININE 6.3*  --  4.4*   CALCIUM 8.2*  --  8.6*   PROT 6.5  --  6.6   ALBUMIN 1.9*  --  1.9*   BILITOT 0.6  --  0.6   ALKPHOS 91  --  100   AST 61*  --  55*   ALT 82*  --  79*   ANIONGAP 18*  --  14     Cardiac Markers: No results for input(s): "CKMB", "MYOGLOBIN", "BNP", "TROPISTAT" in the last 48 hours.  POCT Glucose:   Recent Labs   Lab 12/30/23  1536 12/30/23  2234   POCTGLUCOSE 114* 131*     Troponin: No results for input(s): "TROPONINI", "TROPONINIHS" in the last 48 hours.  Urine Culture: No results for input(s): "LABURIN" in the last 48 hours.  Urine Studies: No results for input(s): "COLORU", "APPEARANCEUA", "PHUR", "SPECGRAV", "PROTEINUA", "GLUCUA", "KETONESU", "BILIRUBINUA", "OCCULTUA", "NITRITE", "UROBILINOGEN", "LEUKOCYTESUR", "RBCUA", "WBCUA", "BACTERIA", "SQUAMEPITHEL", "HYALINECASTS" in the last 48 hours.    Invalid input(s): "WRIGHTSUR"    Significant Imaging: I have reviewed all pertinent imaging results/findings within the past 24 hours.    Assessment/Plan:      * Complete heart block  - on admission  - h/o 1st deg AV block and LBBB, on metoprolol and amio on outpatient basis  - EP following- holding on TVP for now- will await improvement of infection prior to placement of leadless pacemaker  - avoid AV iker blocking agents (I.e., CCB, Beta blockers, etc).   - etiology could also be uremia/caitlin/infxn  - tele    Osteomyelitis of foot  - admitted 11/27 for BL wounds- OM of BL calcaneus and 5th metatarsal- found to be MRSA +iv  - s/p bone biopsy and debridement via podiatriy  - on 6wks of IV dapto- EOT " 01/10/24    Acute encephalopathy  - multifactorial- uremic encephalopathy, toxic encephalopathy (ESBL UTI)  - delirium precautions  - continue iHD and IV antibiotics for treatment of UTI and osteo  - monitor for improvement     Multiple thyroid nodules  - TSH stable  - outpatient f/u    Bradycardia  - see CHB    Severe malnutrition  Nutrition consulted. Most recent weight and BMI pending    Measurements:  Wt Readings from Last 1 Encounters:   12/29/23 80.7 kg (178 lb)   Body mass index is 24.83 kg/m².    Patient will been screened and assessed by RD.    Malnutrition Type:  Context: chronic illness  Level: severe    Malnutrition Characteristic Summary:  Weight Loss (Malnutrition): other (see comments) (15% x 3 months)  Energy Intake (Malnutrition): less than 75% for greater than or equal to 3 months  Subcutaneous Fat (Malnutrition): mild depletion  Muscle Mass (Malnutrition): moderate depletion    Interventions/Recommendations (treatment strategy):  1. Rec'd Diabetic/Renal diet  2. If PO intake <50%, add Novasource renal BID  3. RD to monitor and follow      UTI (urinary tract infection)  - Ucx w/ESBL e coli  - Stopped cefepime   - ID consulted- recommending 7d of erta-d1 today    Paroxysmal atrial flutter  - h/o paroxysmal afib, on eliquis on outpatient basis  - OAC held while awaiting potential placement of leadless pacer    Severe aortic valve stenosis  - tte/ 12/29  Summary         Left Ventricle: The left ventricle is normal in size. Ventricular mass is normal. Normal wall thickness. There is concentric remodeling. Septal motion is consistent with bundle branch block. There is normal systolic function with a visually estimated ejection fraction of 55 - 60%. Grade II diastolic dysfunction.    Right Ventricle: Normal right ventricular cavity size. Systolic function is normal.    Left Atrium: Left atrium is severely dilated.    Aortic Valve: There is moderate aortic valve sclerosis. Moderately restricted motion.  There is severe stenosis. Aortic valve area by VTI is 0.86 cm². Aortic valve peak velocity is 4.12 m/s. Mean gradient is 43 mmHg. The dimensionless index is 0.22. There is mild to moderate aortic regurgitation.    Mitral Valve: There is bileaflet sclerosis. There is mild annular dilation present.    Tricuspid Valve: There is mild to moderate regurgitation.    Pulmonary Artery: The estimated pulmonary artery systolic pressure is 72 mmHg.    IVC/SVC: Intermediate venous pressure at 8 mmHg.    - outpatient f/u with interventional cardiology      Acute renal failure superimposed on stage 3b chronic kidney disease  - baseline sCr of 2.1  - sCr of 7.5 at time of admission  - Nephro following  - iHD per nephro- slow improvement in BUN, Cr  - noted to have purulent urine output on jackson placement  - keep jackson for now for strict I's and O's  - Trend sCr  - avoid nephrotoxins       GERD (gastroesophageal reflux disease)  - continue PPI      Anemia  Patient's anemia is currently uncontrolled. Has not received any PRBCs to date. Denies hematemesis, melena, hematochezia, other overt signs of bleeding    Likely multifactorial- ELIZABETH on CKD, h/o OAC use- held at the moment, reported h/o melena 09/2023- no colonoscopy to date- h/o EGD performed in 2016- PUD reported   - will need outpatient EGD/colon    Current CBC reviewed-   Lab Results   Component Value Date    HGB 8.1 (L) 12/31/2023    HCT 25.2 (L) 12/31/2023     Monitor serial CBC and transfuse if patient becomes hemodynamically unstable, symptomatic or H/H drops below 7/21.    Type 2 diabetes mellitus with kidney complication, with long-term current use of insulin  Patient's FSGs are controlled on current medication regimen.  Last A1c reviewed-   Lab Results   Component Value Date    HGBA1C 5.5 09/20/2023     Most recent fingerstick glucose reviewed-   Recent Labs   Lab 12/30/23  1536 12/30/23  2234   POCTGLUCOSE 114* 131*     Current correctional scale  Low  Maintain  anti-hyperglycemic dose as follows-   Antihyperglycemics (From admission, onward)      Start     Stop Route Frequency Ordered    12/28/23 1818  insulin aspart U-100 pen 0-5 Units         -- SubQ Before meals & nightly PRN 12/28/23 1719          Hold Oral hypoglycemics while patient is in the hospital.    Dyslipidemia  - Cont home statin      Essential hypertension  - BP currently not well-controlled  - Holding anti-hypertensives at the moment- goal BP <160/90  - Will continue to monitor and further titrate antihypertensives as clinically indicated       Epigastric abdominal pain  - CT A/P w/cholelithiasis- no evidence of cholecystitis, anasarca present  - n/v/ap could be 2/2 fluid-overloaded state, UTI, etc  - improved        VTE Risk Mitigation (From admission, onward)           Ordered     heparin (porcine) injection 1,000 Units  As needed (PRN)         12/30/23 1056     heparin (porcine) injection 5,000 Units  Every 8 hours         12/29/23 1321     IP VTE HIGH RISK PATIENT  Once         12/28/23 1719     Place sequential compression device  Until discontinued         12/28/23 1719                    Discharge Planning   ELY: 1/5/2024     Code Status: Full Code   Is the patient medically ready for discharge?: No    Reason for patient still in hospital (select all that apply): Patient trending condition                       Gabbi Small MD  Department of Hospital Medicine   [facility] - Cardiology Stepdown

## 2023-12-31 NOTE — ASSESSMENT & PLAN NOTE
Patient with complete heart block, Previous EKG with first degree AV block and LBBB. Patient on metoprolol and amiodarone as an outpatient  Patient hemodynamically stable, but is unable to give a reliable history    Recommendations:  -Hold AV iker agents  -No need for TVP at this time  -Would consider ID consultation. In the setting of his multidrug resistant infecitons and possibly pyelonephritis, would like to have good source control prior to device placement  -Can plan for leadless pacemaker when his infection clears (likely 48-72 hours on appropriate antimicrobial coverage)

## 2023-12-31 NOTE — CONSULTS
[facility] - Cardiology Stepdown  Adult Nutrition  Consult Note    SUMMARY     Recommendations    1. Rec'd Diabetic/Renal diet      2. Add Novasource renal BID      3. RD to monitor and follow    Goals: Meet % EEN, EPN by RD f/u  Nutrition Goal Status: new  Communication of RD Recs:  (POC)    Assessment and Plan    Endocrine  Severe malnutrition  Malnutrition Type:  Context: chronic illness  Level: severe    Related to (etiology):   Inadequate energy intake    Signs and Symptoms (as evidenced by):   Malnutrition Characteristic Summary:  Weight Loss (Malnutrition): other (see comments) (15% x 3 months)  Energy Intake (Malnutrition): less than 75% for greater than or equal to 3 months  Subcutaneous Fat (Malnutrition): mild depletion  Muscle Mass (Malnutrition): moderate depletion    Interventions/Recommendations (treatment strategy):  1. Rec'd Diabetic/Renal diet  2. Add Novasource renal BID  3. RD to monitor and follow    Nutrition Diagnosis Status:   New         Malnutrition Assessment  Malnutrition Context: chronic illness  Malnutrition Level: severe  Skin (Micronutrient): none  Nails (Micronutrient): none  Hair/Scalp (Micronutrient): none  Eyes (Micronutrient): none  Extraoral (Micronutrient): none  Lips/Mucous Membranes (Micronutrient): vertical cracks  Teeth (Micronutrient): none  Tongue (Micronutrient): none  Neck/Chest (Micronutrient): muscle wasting  Musculoskeletal/Lower Extremities: subcutaneous fat loss, muscle wasting       Weight Loss (Malnutrition): other (see comments) (15% x 3 months)  Energy Intake (Malnutrition): less than 75% for greater than or equal to 3 months  Subcutaneous Fat (Malnutrition): mild depletion  Muscle Mass (Malnutrition): moderate depletion   Orbital Region (Subcutaneous Fat Loss): mild depletion  Upper Arm Region (Subcutaneous Fat Loss): well nourished   [place] Region (Muscle Loss): moderate depletion  Clavicle Bone Region (Muscle Loss): moderate depletion  Clavicle and  "Acromion Bone Region (Muscle Loss): mild depletion  Dorsal Hand (Muscle Loss): moderate depletion  Patellar Region (Muscle Loss): moderate depletion  Anterior Thigh Region (Muscle Loss): mild depletion  Posterior Calf Region (Muscle Loss): mild depletion       Reason for Assessment    Reason For Assessment: consult  Diagnosis:  (complete heart block)  Relevant Medical History: HTN, dyslipidemia, anemia, T2DM, GERD, CKD 3b  Interdisciplinary Rounds: did not attend    General Information Comments:   RD consulted for malnutrition. Pt appears confused. Reports decreased appetite. Pt present with ELIZABETH required dialysis. Per wt hx, noted wt loss of 31 lb (15%) x 3 months. NFPE completed today, noted mild-moderate muscle depletion. Pt meet criteria for severe malnutrition.     Nutrition Discharge Planning: Pending medical course    Nutrition Risk Screen    Nutrition Risk Screen: no indicators present, large or nonhealing wound, burn or pressure injury    Nutrition/Diet History    Spiritual, Cultural Beliefs, Bahai Practices, Values that Affect Care: no    Anthropometrics    Temp: 98.1 °F (36.7 °C)  Height Method: Stated  Height: 5' 11" (180.3 cm)  Height (inches): 71 in  Weight Method: Standard Scale  Weight: 80.7 kg (178 lb)  Weight (lb): 178 lb  Ideal Body Weight (IBW), Male: 172 lb  % Ideal Body Weight, Male (lb): 103.49 %  BMI (Calculated): 24.8       Lab/Procedures/Meds    Pertinent Labs Reviewed: reviewed  Pertinent Labs Comments: K 3.4, Phos 5.9, Na 133, , Cr 6.3, albumin 1.9, AST 61, ALT 82, eGFR 8.7, H/H 8.0/24.9  Pertinent Medications Reviewed: reviewed  Pertinent Medications Comments: heparin, folic acid, atorvastatin, famotidine    Physical Findings/Assessment         Estimated/Assessed Needs    Weight Used For Calorie Calculations: 80.7 kg (178 lb)  Energy Calorie Requirements (kcal): 7845-9984 kcal  Energy Need Method: Kcal/kg (25-30)  Protein Requirements: 81-97g (1-1.2g/kg)  Weight Used For " Protein Calculations: 80.7 kg (178 lb)  Fluid Requirements (mL): 1ml/kcal or per MD  Estimated Fluid Requirement Method: RDA Method  RDA Method (mL): 2018  CHO Requirement: 252 - 303g      Nutrition Prescription Ordered    Current Diet Order: Diabetic    Evaluation of Received Nutrient/Fluid Intake    I/O: + 490 ml since admit  Energy Calories Required: not meeting needs  Protein Required: not meeting needs  Comments: LBM 12/29  Tolerance: tolerating    Nutrition Risk    Level of Risk/Frequency of Follow-up:  (1x/week)       Monitor and Evaluation    Food and Nutrient Intake: energy intake, food and beverage intake  Food and Nutrient Adminstration: diet order  Knowledge/Beliefs/Attitudes: food and nutrition knowledge/skill  Physical Activity and Function: nutrition-related ADLs and IADLs  Anthropometric Measurements: height/length, weight, weight change, body mass index  Biochemical Data, Medical Tests and Procedures: gastrointestinal profile, electrolyte and renal panel, inflammatory profile, glucose/endocrine profile, lipid profile  Nutrition-Focused Physical Findings: overall appearance       Nutrition Follow-Up    RD Follow-up?: Yes

## 2023-12-31 NOTE — NURSING
Nurses Note -- 4 Eyes      12/30/2023   17:30      Skin assessed during: Transfer      [] No Altered Skin Integrity Present    []Prevention Measures Documented      [x] Yes- Altered Skin Integrity Present or Discovered   [] LDA Added if Not in Epic (Describe Wound)   [] New Altered Skin Integrity was Present on Admit and Documented in LDA   [x] Wound Image Taken    Wound Care Consulted? No, order already in epic.     Attending Nurse:  JOYCE Monroy    Second RN/Staff Member:  JOYCE Ramirez

## 2023-12-31 NOTE — ASSESSMENT & PLAN NOTE
- h/o paroxysmal afib, on eliquis on outpatient basis  - OAC held while awaiting potential placement of leadless pacer

## 2023-12-31 NOTE — ASSESSMENT & PLAN NOTE
- multifactorial- uremic encephalopathy, toxic encephalopathy (ESBL UTI)  - delirium precautions  - continue iHD and IV antibiotics for treatment of UTI and osteo  - monitor for improvement

## 2023-12-31 NOTE — PLAN OF CARE
AAOX2,VSS,O2 sats>92% on RA.Plan of care discussed with patient. Patient complaining of back pain, lidocaine patch and robaxin PO orders added. Discussed medications and care. Patient has no questions at this time.Pt visualised and stable.Call light within reach.Pt resting,bed at lowest position.      Problem: Infection  Goal: Absence of Infection Signs and Symptoms  Outcome: Ongoing, Progressing     Problem: Adult Inpatient Plan of Care  Goal: Plan of Care Review  Outcome: Ongoing, Progressing  Goal: Patient-Specific Goal (Individualized)  Outcome: Ongoing, Progressing  Goal: Absence of Hospital-Acquired Illness or Injury  Outcome: Ongoing, Progressing  Goal: Optimal Comfort and Wellbeing  Outcome: Ongoing, Progressing  Goal: Readiness for Transition of Care  Outcome: Ongoing, Progressing     Problem: Device-Related Complication Risk (Hemodialysis)  Goal: Safe, Effective Therapy Delivery  Outcome: Ongoing, Progressing     Problem: Hemodynamic Instability (Hemodialysis)  Goal: Effective Tissue Perfusion  Outcome: Ongoing, Progressing     Problem: Infection (Hemodialysis)  Goal: Absence of Infection Signs and Symptoms  Outcome: Ongoing, Progressing     Problem: Diabetes Comorbidity  Goal: Blood Glucose Level Within Targeted Range  Outcome: Ongoing, Progressing     Problem: Fluid and Electrolyte Imbalance (Acute Kidney Injury/Impairment)  Goal: Fluid and Electrolyte Balance  Outcome: Ongoing, Progressing     Problem: Oral Intake Inadequate (Acute Kidney Injury/Impairment)  Goal: Optimal Nutrition Intake  Outcome: Ongoing, Progressing     Problem: Renal Function Impairment (Acute Kidney Injury/Impairment)  Goal: Effective Renal Function  Outcome: Ongoing, Progressing     Problem: Impaired Wound Healing  Goal: Optimal Wound Healing  Outcome: Ongoing, Progressing     Problem: Skin Injury Risk Increased  Goal: Skin Health and Integrity  Outcome: Ongoing, Progressing

## 2023-12-31 NOTE — ASSESSMENT & PLAN NOTE
Patient's anemia is currently uncontrolled. Has not received any PRBCs to date. Denies hematemesis, melena, hematochezia, other overt signs of bleeding    Likely multifactorial- ELIZABETH on CKD, h/o OAC use- held at the moment, reported h/o melena 09/2023- no colonoscopy to date- h/o EGD performed in 2016- PUD reported   - will need outpatient EGD/colon    Current CBC reviewed-   Lab Results   Component Value Date    HGB 8.1 (L) 12/31/2023    HCT 25.2 (L) 12/31/2023     Monitor serial CBC and transfuse if patient becomes hemodynamically unstable, symptomatic or H/H drops below 7/21.

## 2023-12-31 NOTE — ASSESSMENT & PLAN NOTE
Malnutrition Type:  Context: chronic illness  Level: severe    Related to (etiology):   Inadequate energy intake    Signs and Symptoms (as evidenced by):   Malnutrition Characteristic Summary:  Weight Loss (Malnutrition): other (see comments) (15% x 3 months)  Energy Intake (Malnutrition): less than 75% for greater than or equal to 3 months  Subcutaneous Fat (Malnutrition): mild depletion  Muscle Mass (Malnutrition): moderate depletion    Interventions/Recommendations (treatment strategy):  1. Rec'd Diabetic/Renal diet  2. Add Novasource renal BID  3. RD to monitor and follow    Nutrition Diagnosis Status:   New

## 2023-12-31 NOTE — CONSULTS
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 He presented to ED from NH for bradycardia, weakness and ELIZABETH creatinine 7.5 (baseline 2.4). Found to have complete heart block, urinary retention with jackson placement with purulent output. Pt started on HD via RIJ. ID consulted for ESBL E.coli UTI and heel OM. Per EP, plans to place leadless PM once infection and mentation clears.    Recommendations  D/c meropenem. Start ertapenem for ESBL E.coli. anticipate 7 days  Recommend podiatry f/u for heel OM   Continue daptomycin as anticipated EOC 1/10/24  Weekly CPK while on daptomycin  Blood cultures remain NGTD     Outpatient Antibiotic Therapy Plan:    Please send referral to [facility].    1) Infection:  OM MRSA and ESBL E.coli UTI     2) Discharge Antibiotics:    Intravenous antibiotics:  If continues HD and/or CrCl > 30, Daptomycin  q48hr   CrCL>30 daptomycin q24hr, EOC 1/10/24   Ertapenem 500 mg daily x 7 days EOC  1/7/24      4) Outpatient Weekly Labs:    Order the following labs to be drawn on Mondays:   CBC  CMP   CRP    CPK (when on Daptomycin)  Vancomycin trough. Target 15-20      5) Fax Lab Results to Infectious Diseases Provider: james hernandez    [facility] Fax Number: [phone]    6) Outpatient Infectious Diseases Follow-up    Follow-up appointment will be  arranged by the ID clinic and will be found in the patient's appointments tab.    Prior to discharge, please ensure the patient's follow-up has been scheduled.    If there is still no follow-up scheduled prior to discharge, please send an EPIC message to Mandi Brito in Infectious Diseases.              Thank you for your consult. I will sign off. Please contact us if you have any additional questions.    James Hernandez PA-C  Infectious Disease  [facility] - [facility] Step[facility]    Subjective:     Principal Problem: Complete heart block    HPI: Mr. 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 He was discharged with RUE picc line for long term IV abx therapy. He presented to ED from NH for chest pain, flank pain, found to have ELIZABETH creatinine 7.5 (baseline). Found to have complete heart block, followed by EP here. He required hemodialysis, had jackson placement for urinary retention, noted to have 500 cc purulent urine. Urine cultures here +ESBL E.coli. ID consulted for abx recommendations. Per EP, plans to place leadless PM once infection and mentation clears. Had RIJ in place for HD. He remains afebrile, stable, no leukocytosis. Mentation waxes and wanes.     Past Medical History:   Diagnosis Date    Acute congestive heart failure 3/20/2020    Alcohol abuse     Arthritis     Asthma attack 11/24/2021    Coronary artery disease     Diabetes mellitus     Hyperlipemia     Hypertension     Multiple thyroid nodules 6/14/2023    Rhabdomyolysis        Past Surgical History:   Procedure Laterality Date    ECHOCARDIOGRAM,TRANSESOPHAGEAL N/A 9/21/2023    Procedure: Transesophageal echo (MARIA) intra-procedure log documentation;  Surgeon: Luisana Rodríguez MD;  Location: [facility] CATH LAB;  Service: Cardiology;  Laterality: N/A;    EYE SURGERY      IRRIGATION AND DEBRIDEMENT Bilateral  12/1/2023    Procedure: IRRIGATION AND DEBRIDEMENT;  Surgeon: Jenna Gutiérrez DPM;  Location: [facility] OR;  Service: Podiatry;  Laterality: Bilateral;  Request antibiotic beads    TRANSESOPHAGEAL ECHOCARDIOGRAM WITH POSSIBLE CARDIOVERSION (MARIA W/ POSS CARDIOVERSION) N/A 1/5/2023    Procedure: Transesophageal echo (MARIA) intra-procedure log documentation;  Surgeon: Indra Foote MD;  Location: [facility] CATH LAB;  Service: Cardiology;  Laterality: N/A;    TRANSESOPHAGEAL ECHOCARDIOGRAPHY N/A 9/21/2023    Procedure: ECHOCARDIOGRAM, TRANSESOPHAGEAL;  Surgeon: Luisana Rodríguez MD;  Location: [facility] CATH LAB;  Service: Cardiology;  Laterality: N/A;    TREATMENT OF CARDIAC ARRHYTHMIA N/A 12/7/2020    Procedure: Cardioversion or Defibrillation;  Surgeon: Indra Foote MD;  Location: [facility] CATH LAB;  Service: Cardiology;  Laterality: N/A;    TREATMENT OF CARDIAC ARRHYTHMIA N/A 12/30/2020    Procedure: Cardioversion or Defibrillation;  Surgeon: Tyrone Steen MD;  Location: [facility] CATH LAB;  Service: Cardiology;  Laterality: N/A;    TREATMENT OF CARDIAC ARRHYTHMIA N/A 1/5/2023    Procedure: Cardioversion or Defibrillation;  Surgeon: Indra Foote MD;  Location: [facility] CATH LAB;  Service: Cardiology;  Laterality: N/A;    VASCULAR SURGERY         Review of patient's allergies indicates:  No Known Allergies    Medications:  Medications Prior to Admission   Medication Sig    albuterol (PROVENTIL/VENTOLIN HFA) 90 mcg/actuation inhaler Inhale 2 puffs into the lungs every 6 (six) hours as needed for Wheezing or Shortness of Breath.    allopurinoL (ZYLOPRIM) 100 MG tablet Take 1 tablet (100 mg total) by mouth once daily.    amiodarone (PACERONE) 200 MG Tab Take 200 mg by mouth once daily.    ascorbic acid, vitamin C, (VITAMIN C) 500 MG tablet Take 500 mg by mouth 2 (two) times daily.    ELIQUIS 5 mg Tab Take 5 mg by mouth 2 (two) times daily.    ferrous sulfate (FEOSOL) 325 mg (65 mg iron) Tab tablet Take 325 mg by mouth once  daily.    folic acid (FOLVITE) 1 MG tablet Take 1 mg by mouth once daily.    furosemide (LASIX) 80 MG tablet Take 1 tablet (80 mg total) by mouth 2 (two) times daily.    gabapentin (NEURONTIN) 100 MG capsule Take 1 capsule (100 mg total) by mouth 2 (two) times daily.    insulin aspart U-100 (NOVOLOG) 100 unit/mL injection Inject into the skin. Per sliding scale    metOLazone (ZAROXOLYN) 5 MG tablet Take 1 tablet (5 mg total) by mouth once daily.    metoprolol succinate (TOPROL-XL) 25 MG 24 hr tablet Take 25 mg by mouth once daily.    multivitamin (ONE DAILY MULTIVITAMIN) per tablet Take 1 tablet by mouth once daily.    potassium chloride (MICRO-K) 10 MEQ CpSR Take 10 mEq by mouth once daily.    rosuvastatin (CRESTOR) 40 MG Tab Take 40 mg by mouth every evening.    senna-docusate 8.6-50 mg (PERICOLACE) 8.6-50 mg per tablet Take 2 tablets by mouth 2 (two) times daily as needed for Constipation.    tamsulosin (FLOMAX) 0.4 mg Cap Take 1 capsule (0.4 mg total) by mouth once daily.     Antibiotics (From admission, onward)      Start     Stop Route Frequency Ordered    12/31/23 1115  ertapenem (INVANZ) 500 mg in sodium chloride 0.9% 100 mL IVPB         -- IV Every 24 hours (non-standard times) 12/31/23 1000    12/28/23 2200  DAPTOmycin (CUBICIN) 650 mg in sodium chloride 0.9% SolP 50 mL IVPB         -- IV Every 48 hours (non-standard times) 12/28/23 2025          Antifungals (From admission, onward)      None          Antivirals (From admission, onward)      None             Immunization History   Administered Date(s) Administered    Influenza (FLUAD) - Quadrivalent - Adjuvanted - PF *Preferred* (65+) 11/27/2021, 09/22/2023    Influenza - High Dose - PF (65 years and older) 03/11/2016    PPD Test 06/02/2023, 07/31/2023, 08/11/2023    Pneumococcal Conjugate - 13 Valent 03/11/2016, 11/27/2021       Family History       Problem Relation (Age of Onset)    Diabetes Mother, Father, Sister    Hypertension Mother, Father, Sister           Social History     Socioeconomic History    Marital status: Single   Tobacco Use    Smoking status: Never    Smokeless tobacco: Never   Substance and Sexual Activity    Alcohol use: Not Currently     Alcohol/week: 6.0 standard drinks of alcohol     Types: 6 Cans of beer per week    Drug use: No    Sexual activity: Yes     Partners: Female     Social Determinants of Health     Financial Resource Strain: Medium Risk (6/15/2023)    Overall Financial Resource Strain (CARDIA)     Difficulty of Paying Living Expenses: Somewhat hard   Food Insecurity: No Food Insecurity (6/15/2023)    Hunger Vital Sign     Worried About Running Out of Food in the Last Year: Never true     Ran Out of Food in the Last Year: Never true   Transportation Needs: No Transportation Needs (6/15/2023)    PRAPARE - Transportation     Lack of Transportation (Medical): No     Lack of Transportation (Non-Medical): No   Physical Activity: Inactive (6/15/2023)    Exercise Vital Sign     Days of Exercise per Week: 0 days     Minutes of Exercise per Session: 0 min   Stress: Stress Concern Present (6/15/2023)    [employer] [place] [employer] - Occupational Stress Questionnaire     Feeling of Stress : To some extent   Social Connections: Socially Isolated (6/15/2023)    Social Connection and Isolation Panel [NHANES]     Frequency of Communication with Friends and Family: Once a week     Frequency of Social Gatherings with Friends and Family: Once a week     Attends Ep[employer]opalian Services: Never     Active Member of Clubs or Organizations: No     Attends Club or Organization Meetings: Never     Marital Status: Never    Housing Stability: Low Risk  (6/15/2023)    Housing Stability Vital Sign     Unable to Pay for Housing in the Last Year: No     Number of Places Lived in the Last Year: 1     Unstable Housing in the Last Year: No     Review of Systems   Unable to perform ROS: Acuity of condition   Genitourinary:  Positive for difficulty  urinating.   Skin:  Positive for wound.     Objective:     Vital Signs (Most Recent):  Temp: 97.8 °F (36.6 °C) (12/31/23 1158)  Pulse: (!) 49 (12/31/23 1158)  Resp: 16 (12/31/23 1158)  BP: (!) 153/68 (12/31/23 1158)  SpO2: 99 % (12/31/23 1158) Vital Signs (24h Range):  Temp:  [97.6 °F (36.4 °C)-98.7 °F (37.1 °C)] 97.8 °F (36.6 °C)  Pulse:  [39-64] 49  Resp:  [16-22] 16  SpO2:  [95 %-100 %] 99 %  BP: (122-159)/(58-94) 153/68     Weight: 80.7 kg (178 lb)  Body mass index is 24.83 kg/m².    Estimated Creatinine Clearance: 15.9 mL/min (A) (based on SCr of 4.4 mg/dL (H)).     Physical Exam  Vitals and nursing note reviewed.   Constitutional:       General: He is not in acute distress.     Appearance: He is well-developed. He is not diaphoretic.   HENT:      Head: Normocephalic and atraumatic.   Eyes:      Pupils: Pupils are equal, round, and reactive to light.   Neck:      Comments: RIJ for HD  Cardiovascular:      Rate and Rhythm: Normal rate and regular rhythm.      Heart sounds: Normal heart sounds. No murmur heard.     No friction rub. No gallop.   Pulmonary:      Effort: Pulmonary effort is normal. No respiratory distress.      Breath sounds: Normal breath sounds. No wheezing or rales.   Chest:      Chest wall: No tenderness.   Abdominal:      General: Bowel sounds are normal. There is no distension.      Palpations: There is no mass.      Tenderness: There is no abdominal tenderness. There is no guarding.   Genitourinary:     Comments: [place] in place, clear yellow urine output  Musculoskeletal:         General: No deformity. Normal range of motion.      Cervical back: Normal range of motion and neck supple.   Skin:     General: Skin is warm and dry.      Findings: No erythema or rash.      Comments: Feet dressed. Refer to media   Neurological:      Mental Status: He is alert and oriented to person, place, and time.   Psychiatric:         Behavior: Behavior normal.         Thought Content: Thought content normal.          Judgment: Judgment normal.          Significant Labs: All pertinent labs within the past 24 hours have been reviewed.    Significant Imaging: I have reviewed all pertinent imaging results/findings within the past 24 hours.

## 2023-12-31 NOTE — SUBJECTIVE & OBJECTIVE
Past Medical History:   Diagnosis Date    Acute congestive heart failure 3/20/2020    Alcohol abuse     Arthritis     Asthma attack 11/24/2021    Coronary artery disease     Diabetes mellitus     Hyperlipemia     Hypertension     Multiple thyroid nodules 6/14/2023    Rhabdomyolysis        Past Surgical History:   Procedure Laterality Date    ECHOCARDIOGRAM,TRANSESOPHAGEAL N/A 9/21/2023    Procedure: Transesophageal echo (MARIA) intra-procedure log documentation;  Surgeon: Luisana Rodríguez MD;  Location: [facility] CATH LAB;  Service: Cardiology;  Laterality: N/A;    EYE SURGERY      IRRIGATION AND DEBRIDEMENT Bilateral 12/1/2023    Procedure: IRRIGATION AND DEBRIDEMENT;  Surgeon: Jenna Gutiérrez DPM;  Location: [facility] OR;  Service: Podiatry;  Laterality: Bilateral;  Request antibiotic beads    TRANSESOPHAGEAL ECHOCARDIOGRAM WITH POSSIBLE CARDIOVERSION (MARIA W/ POSS CARDIOVERSION) N/A 1/5/2023    Procedure: Transesophageal echo (MARIA) intra-procedure log documentation;  Surgeon: Indra Foote MD;  Location: [facility] CATH LAB;  Service: Cardiology;  Laterality: N/A;    TRANSESOPHAGEAL ECHOCARDIOGRAPHY N/A 9/21/2023    Procedure: ECHOCARDIOGRAM, TRANSESOPHAGEAL;  Surgeon: Luisana Rodríguez MD;  Location: [facility] CATH LAB;  Service: Cardiology;  Laterality: N/A;    TREATMENT OF CARDIAC ARRHYTHMIA N/A 12/7/2020    Procedure: Cardioversion or Defibrillation;  Surgeon: Indra Foote MD;  Location: [facility] CATH LAB;  Service: Cardiology;  Laterality: N/A;    TREATMENT OF CARDIAC ARRHYTHMIA N/A 12/30/2020    Procedure: Cardioversion or Defibrillation;  Surgeon: Tyrone Steen MD;  Location: [facility] CATH LAB;  Service: Cardiology;  Laterality: N/A;    TREATMENT OF CARDIAC ARRHYTHMIA N/A 1/5/2023    Procedure: Cardioversion or Defibrillation;  Surgeon: Indra Foote MD;  Location: [facility] CATH LAB;  Service: Cardiology;  Laterality: N/A;    VASCULAR SURGERY         Review of patient's allergies indicates:  No Known  Allergies    Medications:  Medications Prior to Admission   Medication Sig    albuterol (PROVENTIL/VENTOLIN HFA) 90 mcg/actuation inhaler Inhale 2 puffs into the lungs every 6 (six) hours as needed for Wheezing or Shortness of Breath.    allopurinoL (ZYLOPRIM) 100 MG tablet Take 1 tablet (100 mg total) by mouth once daily.    amiodarone (PACERONE) 200 MG Tab Take 200 mg by mouth once daily.    ascorbic acid, vitamin C, (VITAMIN C) 500 MG tablet Take 500 mg by mouth 2 (two) times daily.    ELIQUIS 5 mg Tab Take 5 mg by mouth 2 (two) times daily.    ferrous sulfate (FEOSOL) 325 mg (65 mg iron) Tab tablet Take 325 mg by mouth once daily.    folic acid (FOLVITE) 1 MG tablet Take 1 mg by mouth once daily.    furosemide (LASIX) 80 MG tablet Take 1 tablet (80 mg total) by mouth 2 (two) times daily.    gabapentin (NEURONTIN) 100 MG capsule Take 1 capsule (100 mg total) by mouth 2 (two) times daily.    insulin aspart U-100 (NOVOLOG) 100 unit/mL injection Inject into the skin. Per sliding scale    metOLazone (ZAROXOLYN) 5 MG tablet Take 1 tablet (5 mg total) by mouth once daily.    metoprolol succinate (TOPROL-XL) 25 MG 24 hr tablet Take 25 mg by mouth once daily.    multivitamin (ONE DAILY MULTIVITAMIN) per tablet Take 1 tablet by mouth once daily.    potassium chloride (MICRO-K) 10 MEQ CpSR Take 10 mEq by mouth once daily.    rosuvastatin (CRESTOR) 40 MG Tab Take 40 mg by mouth every evening.    senna-docusate 8.6-50 mg (PERICOLACE) 8.6-50 mg per tablet Take 2 tablets by mouth 2 (two) times daily as needed for Constipation.    tamsulosin (FLOMAX) 0.4 mg Cap Take 1 capsule (0.4 mg total) by mouth once daily.     Antibiotics (From admission, onward)      Start     Stop Route Frequency Ordered    12/31/23 1115  ertapenem (INVANZ) 500 mg in sodium chloride 0.9% 100 mL IVPB         -- IV Every 24 hours (non-standard times) 12/31/23 1000    12/28/23 2200  DAPTOmycin (CUBICIN) 650 mg in sodium chloride 0.9% SolP 50 mL IVPB          -- IV Every 48 hours (non-standard times) 12/28/23 2025          Antifungals (From admission, onward)      None          Antivirals (From admission, onward)      None             Immunization History   Administered Date(s) Administered    Influenza (FLUAD) - Quadrivalent - Adjuvanted - PF *Preferred* (65+) 11/27/2021, 09/22/2023    Influenza - High Dose - PF (65 years and older) 03/11/2016    PPD Test 06/02/2023, 07/31/2023, 08/11/2023    Pneumococcal Conjugate - 13 Valent 03/11/2016, 11/27/2021       Family History       Problem Relation (Age of Onset)    Diabetes Mother, Father, Sister    Hypertension Mother, Father, Sister          Social History     Socioeconomic History    Marital status: Single   Tobacco Use    Smoking status: Never    Smokeless tobacco: Never   Substance and Sexual Activity    Alcohol use: Not Currently     Alcohol/week: 6.0 standard drinks of alcohol     Types: 6 Cans of beer per week    Drug use: No    Sexual activity: Yes     Partners: Female     Social Determinants of Health     Financial Resource Strain: Medium Risk (6/15/2023)    Overall Financial Resource Strain (CARDIA)     Difficulty of Paying Living Expenses: Somewhat hard   Food Insecurity: No Food Insecurity (6/15/2023)    Hunger Vital Sign     Worried About Running Out of Food in the Last Year: Never true     Ran Out of Food in the Last Year: Never true   Transportation Needs: No Transportation Needs (6/15/2023)    PRAPARE - Transportation     Lack of Transportation (Medical): No     Lack of Transportation (Non-Medical): No   Physical Activity: Inactive (6/15/2023)    Exercise Vital Sign     Days of Exercise per Week: 0 days     Minutes of Exercise per Session: 0 min   Stress: Stress Concern Present (6/15/2023)    [employer] - Occupational Stress Questionnaire     Feeling of Stress : To some extent   Social Connections: Socially Isolated (6/15/2023)    Social Connection and Isolation Panel [NHANES]      Frequency of Communication with Friends and Family: Once a week     Frequency of Social Gatherings with Friends and Family: Once a week     Attends Yarsanism Services: Never     Active Member of Clubs or Organizations: No     Attends Club or Organization Meetings: Never     Marital Status: Never    Housing Stability: Low Risk  (6/15/2023)    Housing Stability Vital Sign     Unable to Pay for Housing in the Last Year: No     Number of Places Lived in the Last Year: 1     Unstable Housing in the Last Year: No     Review of Systems   Unable to perform ROS: Acuity of condition   Genitourinary:  Positive for difficulty urinating.   Skin:  Positive for wound.     Objective:     Vital Signs (Most Recent):  Temp: 97.8 °F (36.6 °C) (12/31/23 1158)  Pulse: (!) 49 (12/31/23 1158)  Resp: 16 (12/31/23 1158)  BP: (!) 153/68 (12/31/23 1158)  SpO2: 99 % (12/31/23 1158) Vital Signs (24h Range):  Temp:  [97.6 °F (36.4 °C)-98.7 °F (37.1 °C)] 97.8 °F (36.6 °C)  Pulse:  [39-64] 49  Resp:  [16-22] 16  SpO2:  [95 %-100 %] 99 %  BP: (122-159)/(58-94) 153/68     Weight: 80.7 kg (178 lb)  Body mass index is 24.83 kg/m².    Estimated Creatinine Clearance: 15.9 mL/min (A) (based on SCr of 4.4 mg/dL (H)).     Physical Exam  Vitals and nursing note reviewed.   Constitutional:       General: He is not in acute distress.     Appearance: He is well-developed. He is not diaphoretic.   HENT:      Head: Normocephalic and atraumatic.   Eyes:      Pupils: Pupils are equal, round, and reactive to light.   Neck:      Comments: RIJ for HD  Cardiovascular:      Rate and Rhythm: Normal rate and regular rhythm.      Heart sounds: Normal heart sounds. No murmur heard.     No friction rub. No gallop.   Pulmonary:      Effort: Pulmonary effort is normal. No respiratory distress.      Breath sounds: Normal breath sounds. No wheezing or rales.   Chest:      Chest wall: No tenderness.   Abdominal:      General: Bowel sounds are normal. There is no  distension.      Palpations: There is no mass.      Tenderness: There is no abdominal tenderness. There is no guarding.   Genitourinary:     Comments: De Leon in place, clear yellow urine output  Musculoskeletal:         General: No deformity. Normal range of motion.      Cervical back: Normal range of motion and neck supple.   Skin:     General: Skin is warm and dry.      Findings: No erythema or rash.      Comments: Feet dressed. Refer to media   Neurological:      Mental Status: He is alert and oriented to person, place, and time.   Psychiatric:         Behavior: Behavior normal.         Thought Content: Thought content normal.         Judgment: Judgment normal.          Significant Labs: All pertinent labs within the past 24 hours have been reviewed.    Significant Imaging: I have reviewed all pertinent imaging results/findings within the past 24 hours.

## 2023-12-31 NOTE — ASSESSMENT & PLAN NOTE
- BP currently not well-controlled  - Holding anti-hypertensives at the moment- goal BP <160/90  - Will continue to monitor and further titrate antihypertensives as clinically indicated

## 2023-12-31 NOTE — PROGRESS NOTES
Bedside hemodialysis treatment 1:1 started per MD orders via R IJ temporary CVC. Patient AAOx3, bradycardic as low as 43 as baseline, stable to start tx.

## 2023-12-31 NOTE — ASSESSMENT & PLAN NOTE
- baseline sCr of 2.1  - sCr of 7.5 at time of admission  - Nephro following  - iHD per nephro- slow improvement in BUN, Cr  - noted to have purulent urine output on jackson placement  - keep jackson for now for strict I's and O's  - Trend sCr  - avoid nephrotoxins

## 2024-01-01 PROBLEM — I47.21 TORSADES DE POINTES: Status: ACTIVE | Noted: 2024-01-01

## 2024-01-01 LAB
ALBUMIN SERPL BCP-MCNC: 1.8 G/DL (ref 3.5–5.2)
ALP SERPL-CCNC: 149 U/L (ref 55–135)
ALT SERPL W/O P-5'-P-CCNC: 62 U/L (ref 10–44)
ANION GAP SERPL CALC-SCNC: 14 MMOL/L (ref 8–16)
ANION GAP SERPL CALC-SCNC: 15 MMOL/L (ref 8–16)
ANION GAP SERPL CALC-SCNC: 15 MMOL/L (ref 8–16)
AST SERPL-CCNC: 48 U/L (ref 10–40)
BASOPHILS # BLD AUTO: 0.04 K/UL (ref 0–0.2)
BASOPHILS # BLD AUTO: 0.05 K/UL (ref 0–0.2)
BASOPHILS NFR BLD: 0.3 % (ref 0–1.9)
BASOPHILS NFR BLD: 0.6 % (ref 0–1.9)
BILIRUB SERPL-MCNC: 0.7 MG/DL (ref 0.1–1)
BUN SERPL-MCNC: 84 MG/DL (ref 8–23)
BUN SERPL-MCNC: 85 MG/DL (ref 8–23)
BUN SERPL-MCNC: 85 MG/DL (ref 8–23)
CALCIUM SERPL-MCNC: 8.8 MG/DL (ref 8.7–10.5)
CALCIUM SERPL-MCNC: 8.9 MG/DL (ref 8.7–10.5)
CALCIUM SERPL-MCNC: 9.1 MG/DL (ref 8.7–10.5)
CHLORIDE SERPL-SCNC: 100 MMOL/L (ref 95–110)
CHLORIDE SERPL-SCNC: 99 MMOL/L (ref 95–110)
CHLORIDE SERPL-SCNC: 99 MMOL/L (ref 95–110)
CO2 SERPL-SCNC: 24 MMOL/L (ref 23–29)
CO2 SERPL-SCNC: 24 MMOL/L (ref 23–29)
CO2 SERPL-SCNC: 25 MMOL/L (ref 23–29)
CREAT SERPL-MCNC: 5.5 MG/DL (ref 0.5–1.4)
CREAT SERPL-MCNC: 5.6 MG/DL (ref 0.5–1.4)
CREAT SERPL-MCNC: 5.6 MG/DL (ref 0.5–1.4)
DIFFERENTIAL METHOD BLD: ABNORMAL
DIFFERENTIAL METHOD BLD: ABNORMAL
EOSINOPHIL # BLD AUTO: 0.1 K/UL (ref 0–0.5)
EOSINOPHIL # BLD AUTO: 0.2 K/UL (ref 0–0.5)
EOSINOPHIL NFR BLD: 1 % (ref 0–8)
EOSINOPHIL NFR BLD: 2.3 % (ref 0–8)
ERYTHROCYTE [DISTWIDTH] IN BLOOD BY AUTOMATED COUNT: 18 % (ref 11.5–14.5)
ERYTHROCYTE [DISTWIDTH] IN BLOOD BY AUTOMATED COUNT: 18.1 % (ref 11.5–14.5)
EST. GFR  (NO RACE VARIABLE): 10.1 ML/MIN/1.73 M^2
EST. GFR  (NO RACE VARIABLE): 10.1 ML/MIN/1.73 M^2
EST. GFR  (NO RACE VARIABLE): 10.3 ML/MIN/1.73 M^2
GLUCOSE SERPL-MCNC: 83 MG/DL (ref 70–110)
GLUCOSE SERPL-MCNC: 85 MG/DL (ref 70–110)
GLUCOSE SERPL-MCNC: 86 MG/DL (ref 70–110)
HCT VFR BLD AUTO: 23.6 % (ref 40–54)
HCT VFR BLD AUTO: 25.7 % (ref 40–54)
HGB BLD-MCNC: 7.7 G/DL (ref 14–18)
HGB BLD-MCNC: 8.2 G/DL (ref 14–18)
IMM GRANULOCYTES # BLD AUTO: 0.04 K/UL (ref 0–0.04)
IMM GRANULOCYTES # BLD AUTO: 0.09 K/UL (ref 0–0.04)
IMM GRANULOCYTES NFR BLD AUTO: 0.5 % (ref 0–0.5)
IMM GRANULOCYTES NFR BLD AUTO: 0.8 % (ref 0–0.5)
LYMPHOCYTES # BLD AUTO: 0.8 K/UL (ref 1–4.8)
LYMPHOCYTES # BLD AUTO: 0.8 K/UL (ref 1–4.8)
LYMPHOCYTES NFR BLD: 10.5 % (ref 18–48)
LYMPHOCYTES NFR BLD: 7.1 % (ref 18–48)
MAGNESIUM SERPL-MCNC: 2.2 MG/DL (ref 1.6–2.6)
MAGNESIUM SERPL-MCNC: 2.3 MG/DL (ref 1.6–2.6)
MAGNESIUM SERPL-MCNC: 2.5 MG/DL (ref 1.6–2.6)
MCH RBC QN AUTO: 25.1 PG (ref 27–31)
MCH RBC QN AUTO: 25.7 PG (ref 27–31)
MCHC RBC AUTO-ENTMCNC: 31.9 G/DL (ref 32–36)
MCHC RBC AUTO-ENTMCNC: 32.6 G/DL (ref 32–36)
MCV RBC AUTO: 79 FL (ref 82–98)
MCV RBC AUTO: 79 FL (ref 82–98)
MONOCYTES # BLD AUTO: 0.2 K/UL (ref 0.3–1)
MONOCYTES # BLD AUTO: 1.4 K/UL (ref 0.3–1)
MONOCYTES NFR BLD: 11.5 % (ref 4–15)
MONOCYTES NFR BLD: 2.8 % (ref 4–15)
NEUTROPHILS # BLD AUTO: 6.6 K/UL (ref 1.8–7.7)
NEUTROPHILS # BLD AUTO: 9.3 K/UL (ref 1.8–7.7)
NEUTROPHILS NFR BLD: 79.3 % (ref 38–73)
NEUTROPHILS NFR BLD: 83.3 % (ref 38–73)
NRBC BLD-RTO: 0 /100 WBC
NRBC BLD-RTO: 0 /100 WBC
PHOSPHATE SERPL-MCNC: 4.3 MG/DL (ref 2.7–4.5)
PHOSPHATE SERPL-MCNC: 4.4 MG/DL (ref 2.7–4.5)
PLATELET # BLD AUTO: 259 K/UL (ref 150–450)
PLATELET # BLD AUTO: 274 K/UL (ref 150–450)
PMV BLD AUTO: 8.6 FL (ref 9.2–12.9)
PMV BLD AUTO: 8.7 FL (ref 9.2–12.9)
POCT GLUCOSE: 106 MG/DL (ref 70–110)
POCT GLUCOSE: 109 MG/DL (ref 70–110)
POCT GLUCOSE: 94 MG/DL (ref 70–110)
POTASSIUM SERPL-SCNC: 3.4 MMOL/L (ref 3.5–5.1)
POTASSIUM SERPL-SCNC: 3.5 MMOL/L (ref 3.5–5.1)
POTASSIUM SERPL-SCNC: 4 MMOL/L (ref 3.5–5.1)
PROT SERPL-MCNC: 6.2 G/DL (ref 6–8.4)
RBC # BLD AUTO: 3 M/UL (ref 4.6–6.2)
RBC # BLD AUTO: 3.27 M/UL (ref 4.6–6.2)
SODIUM SERPL-SCNC: 138 MMOL/L (ref 136–145)
SODIUM SERPL-SCNC: 138 MMOL/L (ref 136–145)
SODIUM SERPL-SCNC: 139 MMOL/L (ref 136–145)
TROPONIN I SERPL DL<=0.01 NG/ML-MCNC: 0.38 NG/ML (ref 0–0.03)
WBC # BLD AUTO: 11.78 K/UL (ref 3.9–12.7)
WBC # BLD AUTO: 7.97 K/UL (ref 3.9–12.7)

## 2024-01-01 PROCEDURE — 84484 ASSAY OF TROPONIN QUANT: CPT | Performed by: STUDENT IN AN ORGANIZED HEALTH CARE EDUCATION/TRAINING PROGRAM

## 2024-01-01 PROCEDURE — 25000003 PHARM REV CODE 250: Performed by: PHYSICIAN ASSISTANT

## 2024-01-01 PROCEDURE — 85025 COMPLETE CBC W/AUTO DIFF WBC: CPT | Mod: 91 | Performed by: STUDENT IN AN ORGANIZED HEALTH CARE EDUCATION/TRAINING PROGRAM

## 2024-01-01 PROCEDURE — 93005 ELECTROCARDIOGRAM TRACING: CPT

## 2024-01-01 PROCEDURE — 63600175 PHARM REV CODE 636 W HCPCS

## 2024-01-01 PROCEDURE — 84100 ASSAY OF PHOSPHORUS: CPT | Mod: 91 | Performed by: STUDENT IN AN ORGANIZED HEALTH CARE EDUCATION/TRAINING PROGRAM

## 2024-01-01 PROCEDURE — 63600175 PHARM REV CODE 636 W HCPCS: Performed by: PHYSICIAN ASSISTANT

## 2024-01-01 PROCEDURE — 5A1223Z PERFORMANCE OF CARDIAC PACING, CONTINUOUS: ICD-10-PCS | Performed by: INTERNAL MEDICINE

## 2024-01-01 PROCEDURE — 63600175 PHARM REV CODE 636 W HCPCS: Performed by: INTERNAL MEDICINE

## 2024-01-01 PROCEDURE — 83735 ASSAY OF MAGNESIUM: CPT | Mod: 91 | Performed by: INTERNAL MEDICINE

## 2024-01-01 PROCEDURE — 99233 SBSQ HOSP IP/OBS HIGH 50: CPT | Mod: ,,, | Performed by: INTERNAL MEDICINE

## 2024-01-01 PROCEDURE — 25000003 PHARM REV CODE 250: Performed by: STUDENT IN AN ORGANIZED HEALTH CARE EDUCATION/TRAINING PROGRAM

## 2024-01-01 PROCEDURE — 25000003 PHARM REV CODE 250: Performed by: INTERNAL MEDICINE

## 2024-01-01 PROCEDURE — 85025 COMPLETE CBC W/AUTO DIFF WBC: CPT | Performed by: INTERNAL MEDICINE

## 2024-01-01 PROCEDURE — 80048 BASIC METABOLIC PNL TOTAL CA: CPT | Mod: XB | Performed by: INTERNAL MEDICINE

## 2024-01-01 PROCEDURE — 99232 SBSQ HOSP IP/OBS MODERATE 35: CPT | Mod: GC,,, | Performed by: INTERNAL MEDICINE

## 2024-01-01 PROCEDURE — 83735 ASSAY OF MAGNESIUM: CPT | Performed by: INTERNAL MEDICINE

## 2024-01-01 PROCEDURE — 27000207 HC ISOLATION

## 2024-01-01 PROCEDURE — 93005 ELECTROCARDIOGRAM TRACING: CPT | Performed by: INTERNAL MEDICINE

## 2024-01-01 PROCEDURE — 80048 BASIC METABOLIC PNL TOTAL CA: CPT | Mod: 91,XB | Performed by: STUDENT IN AN ORGANIZED HEALTH CARE EDUCATION/TRAINING PROGRAM

## 2024-01-01 PROCEDURE — 80053 COMPREHEN METABOLIC PANEL: CPT | Performed by: INTERNAL MEDICINE

## 2024-01-01 PROCEDURE — 20000000 HC ICU ROOM

## 2024-01-01 PROCEDURE — 93010 ELECTROCARDIOGRAM REPORT: CPT | Mod: 76,,, | Performed by: INTERNAL MEDICINE

## 2024-01-01 PROCEDURE — 25000003 PHARM REV CODE 250

## 2024-01-01 PROCEDURE — 63600175 PHARM REV CODE 636 W HCPCS: Performed by: STUDENT IN AN ORGANIZED HEALTH CARE EDUCATION/TRAINING PROGRAM

## 2024-01-01 PROCEDURE — 83735 ASSAY OF MAGNESIUM: CPT | Mod: 91 | Performed by: STUDENT IN AN ORGANIZED HEALTH CARE EDUCATION/TRAINING PROGRAM

## 2024-01-01 PROCEDURE — 63600175 PHARM REV CODE 636 W HCPCS: Mod: JZ,JG | Performed by: STUDENT IN AN ORGANIZED HEALTH CARE EDUCATION/TRAINING PROGRAM

## 2024-01-01 PROCEDURE — 84100 ASSAY OF PHOSPHORUS: CPT | Performed by: INTERNAL MEDICINE

## 2024-01-01 PROCEDURE — 94761 N-INVAS EAR/PLS OXIMETRY MLT: CPT

## 2024-01-01 PROCEDURE — 3E03317 INTRODUCTION OF OTHER THROMBOLYTIC INTO PERIPHERAL VEIN, PERCUTANEOUS APPROACH: ICD-10-PCS | Performed by: HOSPITALIST

## 2024-01-01 PROCEDURE — 99291 CRITICAL CARE FIRST HOUR: CPT | Mod: ,,, | Performed by: INTERNAL MEDICINE

## 2024-01-01 PROCEDURE — 93010 ELECTROCARDIOGRAM REPORT: CPT | Mod: ,,, | Performed by: INTERNAL MEDICINE

## 2024-01-01 RX ORDER — MAGNESIUM SULFATE 1 G/100ML
1 INJECTION INTRAVENOUS ONCE
Status: COMPLETED | OUTPATIENT
Start: 2024-01-01 | End: 2024-01-01

## 2024-01-01 RX ORDER — POTASSIUM CHLORIDE 14.9 MG/ML
20 INJECTION INTRAVENOUS
Status: DISCONTINUED | OUTPATIENT
Start: 2024-01-01 | End: 2024-01-01

## 2024-01-01 RX ORDER — MAGNESIUM SULFATE HEPTAHYDRATE 40 MG/ML
2 INJECTION, SOLUTION INTRAVENOUS ONCE
Status: COMPLETED | OUTPATIENT
Start: 2024-01-01 | End: 2024-01-01

## 2024-01-01 RX ORDER — MUPIROCIN 20 MG/G
OINTMENT TOPICAL 2 TIMES DAILY
Status: COMPLETED | OUTPATIENT
Start: 2024-01-01 | End: 2024-01-06

## 2024-01-01 RX ORDER — POTASSIUM CHLORIDE 7.45 MG/ML
20 INJECTION INTRAVENOUS
Status: DISCONTINUED | OUTPATIENT
Start: 2024-01-01 | End: 2024-01-01

## 2024-01-01 RX ORDER — LIDOCAINE HYDROCHLORIDE 10 MG/ML
INJECTION INFILTRATION; PERINEURAL
Status: COMPLETED
Start: 2024-01-01 | End: 2024-01-01

## 2024-01-01 RX ADMIN — MAGNESIUM SULFATE HEPTAHYDRATE 2 G: 40 INJECTION, SOLUTION INTRAVENOUS at 01:01

## 2024-01-01 RX ADMIN — DAPTOMYCIN 650 MG: 350 INJECTION, POWDER, LYOPHILIZED, FOR SOLUTION INTRAVENOUS at 09:01

## 2024-01-01 RX ADMIN — HEPARIN SODIUM 5000 UNITS: 5000 INJECTION INTRAVENOUS; SUBCUTANEOUS at 09:01

## 2024-01-01 RX ADMIN — POTASSIUM CHLORIDE 20 MEQ: 200 INJECTION, SOLUTION INTRAVENOUS at 06:01

## 2024-01-01 RX ADMIN — HEPARIN SODIUM 5000 UNITS: 5000 INJECTION INTRAVENOUS; SUBCUTANEOUS at 05:01

## 2024-01-01 RX ADMIN — SODIUM CHLORIDE: 9 INJECTION, SOLUTION INTRAVENOUS at 09:01

## 2024-01-01 RX ADMIN — HEPARIN SODIUM 5000 UNITS: 5000 INJECTION INTRAVENOUS; SUBCUTANEOUS at 02:01

## 2024-01-01 RX ADMIN — MUPIROCIN: 20 OINTMENT TOPICAL at 09:01

## 2024-01-01 RX ADMIN — ALTEPLASE 2 MG: 2.2 INJECTION, POWDER, LYOPHILIZED, FOR SOLUTION INTRAVENOUS at 02:01

## 2024-01-01 RX ADMIN — MAGNESIUM SULFATE HEPTAHYDRATE 1 G: 500 INJECTION, SOLUTION INTRAMUSCULAR; INTRAVENOUS at 06:01

## 2024-01-01 RX ADMIN — LIDOCAINE 5% 1 PATCH: 700 PATCH TOPICAL at 08:01

## 2024-01-01 RX ADMIN — LIDOCAINE HYDROCHLORIDE 100 MG: 10 INJECTION, SOLUTION INFILTRATION; PERINEURAL at 09:01

## 2024-01-01 RX ADMIN — MAGNESIUM SULFATE HEPTAHYDRATE 2 G: 40 INJECTION, SOLUTION INTRAVENOUS at 04:01

## 2024-01-01 RX ADMIN — ERTAPENEM 500 MG: 1 INJECTION INTRAMUSCULAR; INTRAVENOUS at 11:01

## 2024-01-01 NOTE — SUBJECTIVE & OBJECTIVE
Interval History: Getting hemodialysis, ID consulted and left recs regarding ATB therapy. Had an episode of unresponsiveness and TdP which resolved after 10-20 seconds of chest compressions. After that he was on sinus tachy with complete heart block.      sodium chloride 0.9%   Intravenous Once    sodium chloride 0.9%   Intravenous Once    acetaminophen  1,000 mg Oral Q8H    allopurinoL  50 mg Oral Daily    atorvastatin  80 mg Oral Daily    DAPTOmycin (CUBICIN) IV (PEDS and ADULTS)  8 mg/kg Intravenous Q48H    ertapenem (INVANZ) IVPB  500 mg Intravenous Q24H    famotidine  20 mg Oral Daily    folic acid  1 mg Oral Daily    heparin (porcine)  5,000 Units Subcutaneous Q8H    LIDOcaine  1 patch Transdermal Q24H    magnesium sulfate IVPB  1 g Intravenous Once    potassium chloride in water  20 mEq Intravenous Q2H       0.9%  NaCl infusion (for blood administration), sodium chloride 0.9%, dextrose 10%, dextrose 10%, glucagon (human recombinant), glucose, glucose, heparin (porcine), insulin aspart U-100, magnesium oxide, methocarbamoL, naloxone, ondansetron, senna-docusate 8.6-50 mg, sodium chloride 0.9%, sodium chloride 0.9%      Review of Systems   Unable to perform ROS: Mental status change     Objective:     Vital Signs (Most Recent):  Temp: 99.5 °F (37.5 °C) (01/01/24 0645)  Pulse: (!) 51 (01/01/24 0645)  Resp: (!) 24 (01/01/24 0645)  BP: (!) 150/67 (01/01/24 0645)  SpO2: (!) 93 % (01/01/24 0700) Vital Signs (24h Range):  Temp:  [97.4 °F (36.3 °C)-99.5 °F (37.5 °C)] 99.5 °F (37.5 °C)  Pulse:  [45-65] 51  Resp:  [14-24] 24  SpO2:  [93 %-100 %] 93 %  BP: (116-160)/(57-69) 150/67     Weight: 80.7 kg (178 lb)  Body mass index is 24.83 kg/m².     SpO2: (!) 93 %    Telemetry : Complete heart block, HR 49. In the AM patient had an episode of TdP and then sinus tachy with CHB.     Physical Exam  Constitutional:       General: He is not in acute distress.     Appearance: He is ill-appearing.   HENT:      Mouth/Throat:       Mouth: Mucous membranes are dry.   Cardiovascular:      Rate and Rhythm: Bradycardia present.      Heart sounds: Murmur heard.   Pulmonary:      Effort: Pulmonary effort is normal.      Breath sounds: Normal breath sounds.   Musculoskeletal:      Right lower leg: No edema.      Left lower leg: No edema.   Skin:     General: Skin is warm.   Neurological:      Mental Status: He is disoriented.          Significant Labs:     Recent Labs   Lab 23  0404 24  0420 24  0630   WBC 8.88 11.78 7.97   HGB 8.1* 7.7* 8.2*   HCT 25.2* 23.6* 25.7*    259 274       Recent Labs   Lab 23  0404 24  0420 24  0630    139 138   K 3.5 3.4* 3.5   CL 99 100 99   CO2  24   BUN 73* 84* 85*   CREATININE 4.4* 5.6* 5.5*   CALCIUM 8.6* 8.9 9.1   PHOS 3.9 4.3 4.4       Recent Labs   Lab 23  0406 23  0404 24  0420   ALKPHOS 91 100 149*   BILITOT 0.6 0.6 0.7   PROT 6.5 6.6 6.2   ALT 82* 79* 62*   AST 61* 55* 48*           Recent Labs   Lab 23  2324 24  0630   *  --    TROPONINI  --  0.384*     Significant Imagin/29/23    Left Ventricle: The left ventricle is normal in size. Ventricular mass is normal. Normal wall thickness. There is concentric remodeling. Septal motion is consistent with bundle branch block. There is normal systolic function with a visually estimated ejection fraction of 55 - 60%. Grade II diastolic dysfunction.    Right Ventricle: Normal right ventricular cavity size. Systolic function is normal.    Left Atrium: Left atrium is severely dilated.    Aortic Valve: There is moderate aortic valve sclerosis. Moderately restricted motion. There is severe stenosis. Aortic valve area by VTI is 0.86 cm². Aortic valve peak velocity is 4.12 m/s. Mean gradient is 43 mmHg. The dimensionless index is 0.22. There is mild to moderate aortic regurgitation.    Mitral Valve: There is bileaflet sclerosis. There is mild annular dilation present.     Tricuspid Valve: There is mild to moderate regurgitation.    Pulmonary Artery: The estimated pulmonary artery systolic pressure is 72 mmHg.    IVC/SVC: Intermediate venous pressure at 8 mmHg.

## 2024-01-01 NOTE — NURSING
Patient niece at bedside, she asked questions about patient care and goals for discharge. Niece has been making decisions for medical care. Niece explained that he had not assigned a power of  and plans to speak to family concerning medical decisions .    16

## 2024-01-01 NOTE — ASSESSMENT & PLAN NOTE
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 Infections (UTI and OM) could also be contributing  Remains disoriented but alertness improved, answers some questions appropriately. Oriented to place and month.   Currently able to protect airway but will continue to monitor  CT head unremarkable  Continue ertapenem for UTI  Continue daptomycin for OM

## 2024-01-01 NOTE — ASSESSMENT & PLAN NOTE
Admitted on 11/27 from NH for b/l wounds. 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 Wound swab of R ulcer with MRSA.   s/p bone biopsy and debridement by podiatry   On 6 weeks of IV abx Dapto with end date end of 1/10/2024

## 2024-01-01 NOTE — ASSESSMENT & PLAN NOTE
Cardiology team spoke with SID over the phone who confirmed DNR status however advised that she would like a PPM inserted

## 2024-01-01 NOTE — PROGRESS NOTES
[facility] - [facility]  Cardiology  Progress Note    Patient Name: Chato Bowie  MRN: [id number]  Admission Date: 12/28/2023  Hospital Length of Stay: 4 days  Code Status: DNR   Attending Physician: Stephen Werner MD   Primary Care Physician: Irlanda Valenzuela -  Expected Discharge Date: 1/5/2024  Principal Problem:Complete heart block    Subjective:     Hospital Course:   Admitted for CHB which EP thought to be 2/2 uremia/ELIZABETH and infection. Has not required pacing and is HDS. Recommended holding AV iker agents. BUN and sCr newly elevated, seen by nephrology who started dialysis for clearance. Tolerated dialysis with plans to further dialyze. UCx growing GNR, on cefepime. Continued daptomycin for his BL heel OM. EP holding on PPM insertion for now, would like to see if he improves with dialysis; likely a better candidate for leadless pacemaker given his multiple infections. Consider ID consult for his multidrug resistant organisms and possible pyelonephritis to ensure good source control. Mildly delirious which could be 2/2 his uremia and infection, further assessing with CT head which was unremarkable. Stepped down to  but came back to CCU after he went into torsades on 12/31. CPR was initiated and Pt regained pulse and consciousness; bradycardic post event with HR ~50. Electrolytes replaced. TVP placed in CCU; spoke with SID who confirmed a DNR status but that she would want a pacemaker placed. EP consulted for PPM insertion. Had another episode of torsades (~ 2 mins) that became CHB and then was paced to NSR.     Interval History: Stepped back up to CCU after going into torsades. TVP placed, EP consulted with plans for PPM. Electrolytes corrected. SID reported DNR code status, changed in chart but that she would like for him to receive a PPM    Review of Systems   Unable to perform ROS: Mental status change     Objective:     Vital Signs (Most Recent):  Temp: 99 °F (37.2 °C) (01/01/24  0805)  Pulse: (!) 43 (01/01/24 1205)  Resp: 12 (01/01/24 1205)  BP: (!) 144/67 (01/01/24 1205)  SpO2: 100 % (01/01/24 1205) Vital Signs (24h Range):  Temp:  [97.4 °F (36.3 °C)-99.5 °F (37.5 °C)] 99 °F (37.2 °C)  Pulse:  [43-65] 43  Resp:  [12-24] 12  SpO2:  [93 %-100 %] 100 %  BP: (116-160)/(57-69) 144/67     Weight: 80.7 kg (178 lb)  Body mass index is 24.83 kg/m².     SpO2: 100 %         Intake/Output Summary (Last 24 hours) at 1/1/2024 1321  Last data filed at 1/1/2024 1205  Gross per 24 hour   Intake 291 ml   Output 700 ml   Net -409 ml       Lines/Drains/Airways       Peripherally Inserted Central Catheter Line  Duration             PICC Double Lumen 12/07/23 1457 right brachial 24 days    PICC Double Lumen 12/07/23 1457 right cephalic 24 days              Central Venous Catheter Line  Duration             Trialysis (Dialysis) Catheter 12/29/23 2117 right internal jugular 2 days     Introducer Single Lumen 01/01/24 0927 Internal Jugular Left <1 day              Drain  Duration                  Urethral Catheter 12/28/23 1600 Double-lumen 16 Fr. 3 days              Line  Duration                  Pacer Wires 01/01/24 0928 <1 day                       Physical Exam  Vitals and nursing note reviewed.   Constitutional:       General: He is not in acute distress.     Appearance: He is ill-appearing.   HENT:      Mouth/Throat:      Mouth: Mucous membranes are dry.   Eyes:      Extraocular Movements: Extraocular movements intact.      Pupils: Pupils are equal, round, and reactive to light.   Cardiovascular:      Rate and Rhythm: Regular rhythm. Bradycardia present.      Pulses: Normal pulses.      Heart sounds: Normal heart sounds. No murmur heard.  Pulmonary:      Effort: Pulmonary effort is normal.      Breath sounds: Normal breath sounds. No wheezing, rhonchi or rales.   Abdominal:      General: Bowel sounds are normal. There is no distension.      Palpations: Abdomen is soft.      Tenderness: There is no abdominal  tenderness.   Musculoskeletal:         General: No tenderness.      Right lower leg: No edema.      Left lower leg: No edema.   Skin:     General: Skin is warm and dry.      Capillary Refill: Capillary refill takes less than 2 seconds.      Findings: No erythema or rash.   Neurological:      Mental Status: He is lethargic, disoriented and confused.            Significant Labs: CMP   Recent Labs   Lab 12/31/23  0404 01/01/24  0420 01/01/24  0630 01/01/24  1133    139 138 138   K 3.5 3.4* 3.5 4.0   CL 99 100 99 99   CO2 24 25 24 24   GLU 93 83 85 86   BUN 73* 84* 85* 85*   CREATININE 4.4* 5.6* 5.5* 5.6*   CALCIUM 8.6* 8.9 9.1 8.8   PROT 6.6 6.2  --   --    ALBUMIN 1.9* 1.8*  --   --    BILITOT 0.6 0.7  --   --    ALKPHOS 100 149*  --   --    AST 55* 48*  --   --    ALT 79* 62*  --   --    ANIONGAP 14 14 15 15   , CBC   Recent Labs   Lab 12/31/23  0404 01/01/24  0420 01/01/24  0630   WBC 8.88 11.78 7.97   HGB 8.1* 7.7* 8.2*   HCT 25.2* 23.6* 25.7*    259 274   , All pertinent lab results from the last 24 hours have been reviewed., and   Recent Lab Results  (Last 5 results in the past 24 hours)        01/01/24  1144   01/01/24  1133   01/01/24  0828   01/01/24  0630   01/01/24  0420        Albumin         1.8       ALP         149       ALT         62       Anion Gap   15     15   14       AST         48       Baso #       0.05   0.04       Basophil %       0.6   0.3       BILIRUBIN TOTAL         0.7  Comment: For infants and newborns, interpretation of results should be based  on gestational age, weight and in agreement with clinical  observations.    Premature Infant recommended reference ranges:  Up to 24 hours.............<8.0 mg/dL  Up to 48 hours............<12.0 mg/dL  3-5 days..................<15.0 mg/dL  6-29 days.................<15.0 mg/dL         BUN   85     85   84       Calcium   8.8     9.1   8.9       Chloride   99     99   100       CO2   24     24   25       Creatinine   5.6     5.5    5.6       Differential Method       Automated   Automated       eGFR   10.1     10.3   10.1       Eos #       0.2   0.1       Eosinophil %       2.3   1.0       Glucose   86     85   83       Gran # (ANC)       6.6   9.3       Gran %       83.3   79.3       Hematocrit       25.7   23.6       Hemoglobin       8.2   7.7       Immature Grans (Abs)       0.04  Comment: Mild elevation in immature granulocytes is non specific and   can be seen in a variety of conditions including stress response,   acute inflammation, trauma and pregnancy. Correlation with other   laboratory and clinical findings is essential.     0.09  Comment: Mild elevation in immature granulocytes is non specific and   can be seen in a variety of conditions including stress response,   acute inflammation, trauma and pregnancy. Correlation with other   laboratory and clinical findings is essential.         Immature Granulocytes       0.5   0.8       Lymph #       0.8   0.8       Lymph %       10.5   7.1       Magnesium    2.5     2.3   2.2       MCH       25.1   25.7       MCHC       31.9   32.6       MCV       79   79       Mono #       0.2   1.4       Mono %       2.8   11.5       MPV       8.6   8.7       nRBC       0   0       Phosphorus Level       4.4   4.3       Platelet Count       274   259       POCT Glucose 109     94           Potassium   4.0     3.5   3.4       PROTEIN TOTAL         6.2       RBC       3.27   3.00       RDW       18.0   18.1       Sodium   138     138   139       Troponin I       0.384  Comment: The reference interval for Troponin I represents the 99th percentile   cutoff   for our facility and is consistent with 3rd generation assay   performance.           WBC       7.97   11.78                            Significant Imaging:  All imaging from the past 24 hours has been reviewed  Assessment and Plan:     * Complete heart block  Patient with complete heart block, previous EKG with first degree AV block and LBBB  Patient  on metoprolol and amiodarone as an outpatient  HR in the 30s on admission, had improved to ~ 45   EP consulted, thought it could be 2/2 uremia/ELIZABETH/infection; held off on TVP initially  Stepped down to  with plans to see how he improved with dialysis  However went into torsades on 12/31  TVP placed by IC; EP planning to do PPM insertion    Torsades de pointes  Went into torsades overnight 12/31; HR went to 160-170, then Pt lost consciousness and pulse. CPR initiated, Pt regained consciousness and pulse then was transferred back to CCU  Received Mg and K although levels were not critical  Medications reviewed - no obvious meds that could be prolonging QTc  TVP placed by IC  Spoke with SID who confirmed DNR code status but she is happy for a PPM to be inserted  Had another episode of torsades on 1/1 for about 2 mins which then converted to CHB which he was paced out of into a HR ~ 65; spoke with EP who advised to overpace him  Telemetry  Keep K > 4, Mg > 2    Osteomyelitis of foot  Admitted on 11/27 from NH for b/l wounds. 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 Wound swab of R ulcer with MRSA.   s/p bone biopsy and debridement by podiatry   On 6 weeks of IV abx Dapto with end date end of 1/10/2024    Advanced care planning/counseling discussion  Cardiology team spoke with SID over the phone who confirmed DNR status however advised that she would like a PPM inserted    Acute encephalopathy  Likely uremic encephalopathy in the setting of ARF with  and asterixis on exam. Infections (UTI and OM) could also be contributing  Remains disoriented but alertness improved, answers some questions appropriately. Oriented to place and month.   Currently able to protect airway but will continue to monitor  CT head unremarkable  Continue ertapenem for UTI  Continue daptomycin for OM    Bradycardia  See CHB    UTI (urinary tract infection)  Suspect this patient has Complicated cystitis given retention and positive  UA  UCx growing multi drug resistant E coli  ID following, started on ertapenem, anticipate 7 day course    Paroxysmal atrial flutter  On Eliquis at home. Unknown last dose  - Will hold off restating due to likely PPM insertion    Severe aortic valve stenosis  TTE:  Left Ventricle: The left ventricle is normal in size. Ventricular mass is normal. Normal wall thickness. There is concentric remodeling. Septal motion is consistent with bundle branch block. There is normal systolic function with a visually estimated ejection fraction of 55 - 60%. Grade II diastolic dysfunction.    Right Ventricle: Normal right ventricular cavity size. Systolic function is normal.    Left Atrium: Left atrium is severely dilated.    Aortic Valve: There is moderate aortic valve sclerosis. Moderately restricted motion. There is severe stenosis. Aortic valve area by VTI is 0.86 cm². Aortic valve peak velocity is 4.12 m/s. Mean gradient is 43 mmHg. The dimensionless index is 0.22. There is mild to moderate aortic regurgitation.    Mitral Valve: There is bileaflet sclerosis. There is mild annular dilation present.    Tricuspid Valve: There is mild to moderate regurgitation.    Pulmonary Artery: The estimated pulmonary artery systolic pressure is 72 mmHg.    IVC/SVC: Intermediate venous pressure at 8 mmHg.    GERD (gastroesophageal reflux disease)  Continue famotidine    Anemia  Likely multifactorial given CKD and infection. No signs of bleeding.  Has not received any PRBCs to date. Workup pending. Denies hematemesis, melena, hematochezia, other overt signs of bleeding     Current CBC reviewed-         Lab Results   Component Value Date     HGB 7.2 (L) 12/28/2023     HCT 22.9 (L) 12/28/2023      - Will obtain cnosent and transfuse if Hb < 7    Type 2 diabetes mellitus with kidney complication, with long-term current use of insulin  Last HbA1c 5.5  POCT TIDWM and prior to bed  LDSSI  Monitor BGLs, start long acting insulin if  needed    Dyslipidemia  Continue statin    Epigastric abdominal pain  CTAP showing cholelithiasis, no evidence of acute cholecystitis, anasarca      VTE Risk Mitigation (From admission, onward)           Ordered     heparin (porcine) injection 1,000 Units  As needed (PRN)         12/30/23 1056     heparin (porcine) injection 5,000 Units  Every 8 hours         12/29/23 1321     IP VTE HIGH RISK PATIENT  Once         12/28/23 1719     Place sequential compression device  Until discontinued         12/28/23 1719                  Marianna Cullen MD  Cardiology  [facility] - Cardiac Intensive Care

## 2024-01-01 NOTE — ACP (ADVANCE CARE PLANNING)
Advance Care Planning     [facility]  I engaged the healthcare power of   in a voluntary conversation about advance care planning and we specifically addressed what the goals of care would be moving forward, in light of the patient's change in clinical status, specifically patient going into torsades and ongoing torsades.  We did specifically address the patient's likely prognosis, which is fair .  We explored the patient's values and preferences for future care.  The healthcare power of   endorses that what is most important right now is to focus on  patient wishes she has had expressed to him in the past of being a DNR      Patient needs to undergo temporary venous placement due to heart block which health power of  agrees to.  Discussed benefits and risk procedure and she agrees on proceeding.  During the procedure if patient goes into irregular rhythm needing cardioversion she is in agreement with shocks but not CPR.  She wishes the patient to not be intubated if needed.  After procedure performed patient will be a full DNR without exceptions.      Bedside nurse carline and attending of CCU team notified.    I spent a total of 30 minutes engaging the patient in this advance care planning discussion.          Savannah MA MD  Cardiology Fellow  Pager: [phone]  [facility]

## 2024-01-01 NOTE — NURSING TRANSFER
Nursing Transfer Note      1/1/2024   0645 AM    Nurse giving handoff: Paul  Nurse receiving handoff:ICU    Reason patient is being transferred: V tach    Transfer To: room 3078    Transfer via bed    Transfer with cardiac monitoring    Transported by RN and Rapid response team    Telemetry: tele monitor  Order for Tele Monitor? yes    4eyes on Skin: yes    Medicines sent: pt on mag gtt and K gtt    Patient belongings transferred with patient: No, pt has no belongings    Chart send with patient: Yes    Upon arrival to floor: cardiac monitor applied, patient oriented to room, call bell in reach, and bed in lowest position

## 2024-01-01 NOTE — CONSULTS
Wound care went to assess pt for consult. Pt CARIN at this time, wound care to see at a later time.

## 2024-01-01 NOTE — ASSESSMENT & PLAN NOTE
Went into torsades overnight 12/31; HR went to 160-170, then Pt lost consciousness and pulse. CPR initiated, Pt regained consciousness and pulse then was transferred back to CCU  Received Mg and K although levels were not critical  Medications reviewed - no obvious meds that could be prolonging QTc  TVP placed by IC  Spoke with MPOA who confirmed DNR code status but she is happy for a PPM to be inserted  Had another episode of torsades on 1/1 for about 2 mins which then converted to CHB which he was paced out of into a HR ~ 65; spoke with EP who advised to overpace him  Telemetry  Keep K > 4, Mg > 2

## 2024-01-01 NOTE — SIGNIFICANT EVENT
RN was at room giving med, and talking to pt, and noticed bedside tele monitor start to alarming sustaining Vtach HR 170s to 250s, pt lost consciousness, and unable to feel pulse, called help, start chest compression,CN and Ne. MD came at bedside, pt gained consciousness and feel pulse and HR back to 50s, /68, stat EKG and stat BMP, Mag, phos, troponin ordered and done,  K and mag replacement ordered and given, notified Izzy POLANCO, and Rapid response team,rapid team came at bedside, pt still confused and lethargic, and pt transferring to CICU.

## 2024-01-01 NOTE — PLAN OF CARE
VSS. BG monitored. Pt educated on fall risk and remained free from falls/trauma/injury. Denies chest pain, SOB, palpitations, dizziness, pain, or discomfort. Plan of care reviewed with pt, all questions answered. Bed locked in lowest position, call bell within reach, no acute distress noted, will continue to monitor.

## 2024-01-01 NOTE — PROGRESS NOTES
[facility] - Cardiac Int[facility]  Nephrology  Progress Note    Patient Name: Chato Bowie  MRN: [id number]  Admission Date: 12/28/2023  Hospital Length of Stay: 4 days  Attending Provider: Stephen Werner MD   Primary Care Physician: Irlanda Valenzuela -  Principal Problem:Complete heart block    Subjective:     HPI: 73 year old man with a history of longstanding T2DM, HTN, HFpEF, severe aortic stenosis, chronic bradycardia, CKD3b, newly diagnosed bilateral heel osteo on daptomycin admitted from nursing home for electrolyte abnormalities, bilateral flank pain, leg pain. Per report NH obtained labs had had a BUN of 170 for which he was transferred to [facility] for further evaluation. In the ED the BUN of 170 was confirmed along with a creatinine of 7.5 (baseline creatinine 2-2.5 as of 2 weeks ago). Of note, K is 3.6 with CO2 of 23. A jackson was placed with 500 purulent urine out and per family over phone had been dealing with some prostate issues in the past. UA revealed a specific gravity of 1.010, pH of 6, 2+ protein (in setting of ELIZABETH), 3+ leukocytes with many bacteria and WBC clumps.     Nephrology consulted for ELIZABETH and possible dialysis needs    Interval History: Patient seen this morning, no acute distress.  Labs stable on room air. Patient had an episode of torsades this morning.     Review of patient's allergies indicates:  No Known Allergies  Current Facility-Administered Medications   Medication Frequency    0.9%  NaCl infusion (for blood administration) Q24H PRN    0.9%  NaCl infusion Once    0.9%  NaCl infusion Continuous    0.9%  NaCl infusion PRN    0.9%  NaCl infusion Once    acetaminophen tablet 1,000 mg Q8H    allopurinol split tablet 50 mg Daily    atorvastatin tablet 80 mg Daily    DAPTOmycin (CUBICIN) 650 mg in sodium chloride 0.9% SolP 50 mL IVPB Q48H    dextrose 10% bolus 125 mL 125 mL PRN    dextrose 10% bolus 250 mL 250 mL PRN    ertapenem (INVANZ) 500 mg in sodium chloride 0.9% 100 mL IVPB Q24H     famotidine tablet 20 mg Daily    folic acid tablet 1 mg Daily    glucagon (human recombinant) injection 1 mg PRN    glucose chewable tablet 16 g PRN    glucose chewable tablet 24 g PRN    heparin (porcine) injection 1,000 Units PRN    heparin (porcine) injection 5,000 Units Q8H    insulin aspart U-100 pen 0-5 Units QID (AC + HS) PRN    LIDOcaine 5 % patch 1 patch Q24H    magnesium oxide tablet 800 mg PRN    magnesium sulfate 2g in water 50mL IVPB (premix) Once    methocarbamoL tablet 500 mg QID PRN    mupirocin 2 % ointment BID    naloxone 0.4 mg/mL injection 0.02 mg PRN    senna-docusate 8.6-50 mg per tablet 2 tablet BID PRN    sodium chloride 0.9% bolus 250 mL 250 mL PRN    sodium chloride 0.9% flush 10 mL Q12H PRN       Objective:     Vital Signs (Most Recent):  Temp: 98.4 °F (36.9 °C) (01/01/24 1105)  Pulse: 79 (01/01/24 1505)  Resp: 19 (01/01/24 1505)  BP: 136/67 (01/01/24 1505)  SpO2: 99 % (01/01/24 1505) Vital Signs (24h Range):  Temp:  [97.4 °F (36.3 °C)-99.5 °F (37.5 °C)] 98.4 °F (36.9 °C)  Pulse:  [43-80] 79  Resp:  [12-24] 19  SpO2:  [93 %-100 %] 99 %  BP: (116-160)/(57-69) 136/67     Weight: 80.7 kg (178 lb) (12/29/23 0703)  Body mass index is 24.83 kg/m².  Body surface area is 2.01 meters squared.    I/O last 3 completed shifts:  In: 1100 [P.O.:600; Other:500]  Out: 1200 [Urine:700; Other:500]    Physical Exam  Vitals and nursing note reviewed.   Constitutional:       General: He is sleeping. He is not in acute distress.     Appearance: He is normal weight. He is ill-appearing. He is not toxic-appearing.      Interventions: Nasal cannula in place.   HENT:      Head: Normocephalic and atraumatic.      Nose: Nose normal.      Mouth/Throat:      Mouth: Mucous membranes are dry.      Pharynx: No oropharyngeal exudate.   Eyes:      General: No scleral icterus.        Right eye: No discharge.         Left eye: No discharge.      Pupils: Pupils are equal, round, and reactive to light.   Cardiovascular:       Rate and Rhythm: Bradycardia present.   Pulmonary:      Effort: Pulmonary effort is normal. No respiratory distress.      Breath sounds: No wheezing or rales.   Abdominal:      General: Abdomen is flat.      Tenderness: There is abdominal tenderness (bilateral flank).   Musculoskeletal:      Cervical back: Normal range of motion. No rigidity.      Right lower leg: No edema.      Left lower leg: No edema.      Comments: Bilateral heels wrapped in ACE bandages.    Lymphadenopathy:      Cervical: No cervical adenopathy.   Skin:     Capillary Refill: Capillary refill takes less than 2 seconds.   Neurological:      Mental Status: He is easily aroused.      Comments: Difficult to understand, however is following commands. Alert to name.          Significant Labs:  CBC:   Recent Labs   Lab 01/01/24  0630   WBC 7.97   RBC 3.27*   HGB 8.2*   HCT 25.7*      MCV 79*   MCH 25.1*   MCHC 31.9*       CMP:   Recent Labs   Lab 01/01/24  0420 01/01/24  0630 01/01/24  1133   GLU 83   < > 86   CALCIUM 8.9   < > 8.8   ALBUMIN 1.8*  --   --    PROT 6.2  --   --       < > 138   K 3.4*   < > 4.0   CO2 25   < > 24      < > 99   BUN 84*   < > 85*   CREATININE 5.6*   < > 5.6*   ALKPHOS 149*  --   --    ALT 62*  --   --    AST 48*  --   --    BILITOT 0.7  --   --     < > = values in this interval not displayed.       All labs within the past 24 hours have been reviewed.     Assessment/Plan:     Cardiac/Vascular  * Complete heart block  - defer to primary team     Torsades de pointes  -    Renal/  UTI (urinary tract infection)  See ELIZABETH    Acute renal failure superimposed on stage 3b chronic kidney disease  Baseline creatinine 2-2.5 (longstanding DM with bilateral osteomyelitis, HTN)  Multiple comorbitites as well     On admission serum creatinine 7.5  ELIZABETH appears to be multifactorial in setting of obstructive nephropathy, probably pyelonephritis, low effective circulating volume, possible AIN in setting of longstanding ABX  (though daptomycin less likely)     Obstruction relieved by jackson placed 12/28/23 with 500 cc of purulent UOP. Supposedly follows with urology in outpatient setting. Is on tamsulosin.   Pyelonephritis with UA showing 3+ leuks with many bacteria. History of proteus in past.   Low effective circulating volume suggested by physical exam, evidence of acute liver injury in setting of HFpEF with severe aortic stenosis. Home medication included metolazone 1 mg bid     Dialysis consent obtained 12/28/23     Plan/Recommendation  No plans for HD today.   Continue abx (Ceftriaxone started 12/28/23)  Continue jackson and monitor UOP  -Strict ins and outs  -Avoid nephrotoxic agents if possible and renally dose medications  -Avoid drastic hemodynamic changes if possible       Endocrine  Type 2 diabetes mellitus with kidney complication, with long-term current use of insulin  DM:  Hemoglobin A1C   Date Value Ref Range Status   09/20/2023 5.5 4.0 - 5.6 % Final     Comment:     ADA Screening Guidelines:  5.7-6.4%  Consistent with prediabetes  >or=6.5%  Consistent with diabetes    High levels of fetal hemoglobin interfere with the HbA1C  assay. Heterozygous hemoglobin variants (HbS, HgC, etc)do  not significantly interfere with this assay.   However, presence of multiple variants may affect accuracy.               Thank you for your consult. I will follow-up with patient. Please contact us if you have any additional questions.    Chapo Taylor MD  Nephrology  [facility] - Cardiac Int[facility]

## 2024-01-01 NOTE — SUBJECTIVE & OBJECTIVE
Interval History: Patient seen this morning, no acute distress.  Labs stable on room air. Patient had an episode of torsades this morning.     Review of patient's allergies indicates:  No Known Allergies  Current Facility-Administered Medications   Medication Frequency    0.9%  NaCl infusion (for blood administration) Q24H PRN    0.9%  NaCl infusion Once    0.9%  NaCl infusion Continuous    0.9%  NaCl infusion PRN    0.9%  NaCl infusion Once    acetaminophen tablet 1,000 mg Q8H    allopurinol split tablet 50 mg Daily    atorvastatin tablet 80 mg Daily    DAPTOmycin (CUBICIN) 650 mg in sodium chloride 0.9% SolP 50 mL IVPB Q48H    dextrose 10% bolus 125 mL 125 mL PRN    dextrose 10% bolus 250 mL 250 mL PRN    ertapenem (INVANZ) 500 mg in sodium chloride 0.9% 100 mL IVPB Q24H    famotidine tablet 20 mg Daily    folic acid tablet 1 mg Daily    glucagon (human recombinant) injection 1 mg PRN    glucose chewable tablet 16 g PRN    glucose chewable tablet 24 g PRN    heparin (porcine) injection 1,000 Units PRN    heparin (porcine) injection 5,000 Units Q8H    insulin aspart U-100 pen 0-5 Units QID (AC + HS) PRN    LIDOcaine 5 % patch 1 patch Q24H    magnesium oxide tablet 800 mg PRN    magnesium sulfate 2g in water 50mL IVPB (premix) Once    methocarbamoL tablet 500 mg QID PRN    mupirocin 2 % ointment BID    naloxone 0.4 mg/mL injection 0.02 mg PRN    senna-docusate 8.6-50 mg per tablet 2 tablet BID PRN    sodium chloride 0.9% bolus 250 mL 250 mL PRN    sodium chloride 0.9% flush 10 mL Q12H PRN       Objective:     Vital Signs (Most Recent):  Temp: 98.4 °F (36.9 °C) (01/01/24 1105)  Pulse: 79 (01/01/24 1505)  Resp: 19 (01/01/24 1505)  BP: 136/67 (01/01/24 1505)  SpO2: 99 % (01/01/24 1505) Vital Signs (24h Range):  Temp:  [97.4 °F (36.3 °C)-99.5 °F (37.5 °C)] 98.4 °F (36.9 °C)  Pulse:  [43-80] 79  Resp:  [12-24] 19  SpO2:  [93 %-100 %] 99 %  BP: (116-160)/(57-69) 136/67     Weight: 80.7 kg (178 lb) (12/29/23 0703)  Body  mass index is 24.83 kg/m².  Body surface area is 2.01 meters squared.    I/O last 3 completed shifts:  In: 1100 [P.O.:600; Other:500]  Out: 1200 [Urine:700; Other:500]     Physical Exam  Vitals and nursing note reviewed.   Constitutional:       General: He is sleeping. He is not in acute distress.     Appearance: He is normal weight. He is ill-appearing. He is not toxic-appearing.      Interventions: Nasal cannula in place.   HENT:      Head: Normocephalic and atraumatic.      Nose: Nose normal.      Mouth/Throat:      Mouth: Mucous membranes are dry.      Pharynx: No oropharyngeal exudate.   Eyes:      General: No scleral icterus.        Right eye: No discharge.         Left eye: No discharge.      Pupils: Pupils are equal, round, and reactive to light.   Cardiovascular:      Rate and Rhythm: Bradycardia present.   Pulmonary:      Effort: Pulmonary effort is normal. No respiratory distress.      Breath sounds: No wheezing or rales.   Abdominal:      General: Abdomen is flat.      Tenderness: There is abdominal tenderness (bilateral flank).   Musculoskeletal:      Cervical back: Normal range of motion. No rigidity.      Right lower leg: No edema.      Left lower leg: No edema.      Comments: Bilateral heels wrapped in ACE bandages.    Lymphadenopathy:      Cervical: No cervical adenopathy.   Skin:     Capillary Refill: Capillary refill takes less than 2 seconds.   Neurological:      Mental Status: He is easily aroused.      Comments: Difficult to understand, however is following commands. Alert to name.          Significant Labs:  CBC:   Recent Labs   Lab 01/01/24  0630   WBC 7.97   RBC 3.27*   HGB 8.2*   HCT 25.7*      MCV 79*   MCH 25.1*   MCHC 31.9*       CMP:   Recent Labs   Lab 01/01/24  0420 01/01/24  0630 01/01/24  1133   GLU 83   < > 86   CALCIUM 8.9   < > 8.8   ALBUMIN 1.8*  --   --    PROT 6.2  --   --       < > 138   K 3.4*   < > 4.0   CO2 25   < > 24      < > 99   BUN 84*   < > 85*    CREATININE 5.6*   < > 5.6*   ALKPHOS 149*  --   --    ALT 62*  --   --    AST 48*  --   --    BILITOT 0.7  --   --     < > = values in this interval not displayed.       All labs within the past 24 hours have been reviewed.

## 2024-01-01 NOTE — NURSING
Patient in bed with eyes open, physician at bedside. Lwet patient know aboutv plans to put a pacemaker later in week. Patient hard of hearing  but verbalized understanding. At this time patient was awake and alert and was able to follow directions.

## 2024-01-01 NOTE — ASSESSMENT & PLAN NOTE
TTE:  Left Ventricle: The left ventricle is normal in size. Ventricular mass is normal. Normal wall thickness. There is concentric remodeling. Septal motion is consistent with bundle branch block. There is normal systolic function with a visually estimated ejection fraction of 55 - 60%. Grade II diastolic dysfunction.    Right Ventricle: Normal right ventricular cavity size. Systolic function is normal.    Left Atrium: Left atrium is severely dilated.    Aortic Valve: There is moderate aortic valve sclerosis. Moderately restricted motion. There is severe stenosis. Aortic valve area by VTI is 0.86 cm². Aortic valve peak velocity is 4.12 m/s. Mean gradient is 43 mmHg. The dimensionless index is 0.22. There is mild to moderate aortic regurgitation.    Mitral Valve: There is bileaflet sclerosis. There is mild annular dilation present.    Tricuspid Valve: There is mild to moderate regurgitation.    Pulmonary Artery: The estimated pulmonary artery systolic pressure is 72 mmHg.    IVC/SVC: Intermediate venous pressure at 8 mmHg.

## 2024-01-01 NOTE — NURSING
2150: Notified Katarina POLANCO when this RN gave pt meds pt doesn't know how to swallow the liquid with straw , RN have to crush the pill put in pudding then he start to swallow and RN have to fed him water in spoon in order to let him swallow the water, he doesn't know how to drink with straw, he only know his name, disoriented to place, time, and situation,pt fights with RN and nurse assistant when we try to clean him up when he had BM. Assessed pt neuro, pt only follow some command and able to  grab RN hands and strong grab and PERRLA test done, and it was good, and pt's eyes followed RN when RN moved, unable to follow other Command, no facial drooping noted, only speaks few words about food.    0445 01/01/2024: Notified Izzy. PA pt more oriented than last night, pt know people, date, still doesn't know place, but answer most of questions, speak whole sentences fluently, and drink water with straw without any problem.

## 2024-01-01 NOTE — SUBJECTIVE & OBJECTIVE
Interval History: Stepped back up to CCU after going into torsades. TVP placed, EP consulted with plans for PPM. Electrolytes corrected. SID reported DNR code status, changed in chart but that she would like for him to receive a PPM    Review of Systems   Unable to perform ROS: Mental status change     Objective:     Vital Signs (Most Recent):  Temp: 99 °F (37.2 °C) (01/01/24 0805)  Pulse: (!) 43 (01/01/24 1205)  Resp: 12 (01/01/24 1205)  BP: (!) 144/67 (01/01/24 1205)  SpO2: 100 % (01/01/24 1205) Vital Signs (24h Range):  Temp:  [97.4 °F (36.3 °C)-99.5 °F (37.5 °C)] 99 °F (37.2 °C)  Pulse:  [43-65] 43  Resp:  [12-24] 12  SpO2:  [93 %-100 %] 100 %  BP: (116-160)/(57-69) 144/67     Weight: 80.7 kg (178 lb)  Body mass index is 24.83 kg/m².     SpO2: 100 %         Intake/Output Summary (Last 24 hours) at 1/1/2024 1321  Last data filed at 1/1/2024 1205  Gross per 24 hour   Intake 291 ml   Output 700 ml   Net -409 ml       Lines/Drains/Airways       Peripherally Inserted Central Catheter Line  Duration             PICC Double Lumen 12/07/23 1457 right brachial 24 days    PICC Double Lumen 12/07/23 1457 right cephalic 24 days              Central Venous Catheter Line  Duration             Trialysis (Dialysis) Catheter 12/29/23 2117 right internal jugular 2 days     Introducer Single Lumen 01/01/24 0927 Internal Jugular Left <1 day              Drain  Duration                  Urethral Catheter 12/28/23 1600 Double-lumen 16 Fr. 3 days              Line  Duration                  Pacer Wires 01/01/24 0928 <1 day                       Physical Exam  Vitals and nursing note reviewed.   Constitutional:       General: He is not in acute distress.     Appearance: He is ill-appearing.   HENT:      Mouth/Throat:      Mouth: Mucous membranes are dry.   Eyes:      Extraocular Movements: Extraocular movements intact.      Pupils: Pupils are equal, round, and reactive to light.   Cardiovascular:      Rate and Rhythm: Regular rhythm.  Bradycardia present.      Pulses: Normal pulses.      Heart sounds: Normal heart sounds. No murmur heard.  Pulmonary:      Effort: Pulmonary effort is normal.      Breath sounds: Normal breath sounds. No wheezing, rhonchi or rales.   Abdominal:      General: Bowel sounds are normal. There is no distension.      Palpations: Abdomen is soft.      Tenderness: There is no abdominal tenderness.   Musculoskeletal:         General: No tenderness.      Right lower leg: No edema.      Left lower leg: No edema.   Skin:     General: Skin is warm and dry.      Capillary Refill: Capillary refill takes less than 2 seconds.      Findings: No erythema or rash.   Neurological:      Mental Status: He is lethargic, disoriented and confused.            Significant Labs: CMP   Recent Labs   Lab 12/31/23  0404 01/01/24  0420 01/01/24  0630 01/01/24  1133    139 138 138   K 3.5 3.4* 3.5 4.0   CL 99 100 99 99   CO2 24 25 24 24   GLU 93 83 85 86   BUN 73* 84* 85* 85*   CREATININE 4.4* 5.6* 5.5* 5.6*   CALCIUM 8.6* 8.9 9.1 8.8   PROT 6.6 6.2  --   --    ALBUMIN 1.9* 1.8*  --   --    BILITOT 0.6 0.7  --   --    ALKPHOS 100 149*  --   --    AST 55* 48*  --   --    ALT 79* 62*  --   --    ANIONGAP 14 14 15 15   , CBC   Recent Labs   Lab 12/31/23  0404 01/01/24  0420 01/01/24  0630   WBC 8.88 11.78 7.97   HGB 8.1* 7.7* 8.2*   HCT 25.2* 23.6* 25.7*    259 274   , All pertinent lab results from the last 24 hours have been reviewed., and   Recent Lab Results  (Last 5 results in the past 24 hours)        01/01/24  1144   01/01/24  1133   01/01/24  0828   01/01/24  0630   01/01/24  0420        Albumin         1.8       ALP         149       ALT         62       Anion Gap   15     15   14       AST         48       Baso #       0.05   0.04       Basophil %       0.6   0.3       BILIRUBIN TOTAL         0.7  Comment: For infants and newborns, interpretation of results should be based  on gestational age, weight and in agreement with  clinical  observations.    Premature Infant recommended reference ranges:  Up to 24 hours.............<8.0 mg/dL  Up to 48 hours............<12.0 mg/dL  3-5 days..................<15.0 mg/dL  6-29 days.................<15.0 mg/dL         BUN   85     85   84       Calcium   8.8     9.1   8.9       Chloride   99     99   100       CO2   24     24   25       Creatinine   5.6     5.5   5.6       Differential Method       Automated   Automated       eGFR   10.1     10.3   10.1       Eos #       0.2   0.1       Eosinophil %       2.3   1.0       Glucose   86     85   83       Gran # (ANC)       6.6   9.3       Gran %       83.3   79.3       Hematocrit       25.7   23.6       Hemoglobin       8.2   7.7       Immature Grans (Abs)       0.04  Comment: Mild elevation in immature granulocytes is non specific and   can be seen in a variety of conditions including stress response,   acute inflammation, trauma and pregnancy. Correlation with other   laboratory and clinical findings is essential.     0.09  Comment: Mild elevation in immature granulocytes is non specific and   can be seen in a variety of conditions including stress response,   acute inflammation, trauma and pregnancy. Correlation with other   laboratory and clinical findings is essential.         Immature Granulocytes       0.5   0.8       Lymph #       0.8   0.8       Lymph %       10.5   7.1       Magnesium    2.5     2.3   2.2       MCH       25.1   25.7       MCHC       31.9   32.6       MCV       79   79       Mono #       0.2   1.4       Mono %       2.8   11.5       MPV       8.6   8.7       nRBC       0   0       Phosphorus Level       4.4   4.3       Platelet Count       274   259       POCT Glucose 109     94           Potassium   4.0     3.5   3.4       PROTEIN TOTAL         6.2       RBC       3.27   3.00       RDW       18.0   18.1       Sodium   138     138   139       Troponin I       0.384  Comment: The reference interval for Troponin I represents  the 99th percentile   cutoff   for our facility and is consistent with 3rd generation assay   performance.           WBC       7.97   11.78                            Significant Imaging:  All imaging from the past 24 hours has been reviewed

## 2024-01-01 NOTE — CODE/ RAPID DOCUMENTATION
RAPID RESPONSE NOTE     Torsades Arrhythmia                  Please call Rapid Response RN, Carol Ma RN with any questions or concerns at [phone].

## 2024-01-01 NOTE — ASSESSMENT & PLAN NOTE
Baseline creatinine 2-2.5 (longstanding DM with bilateral osteomyelitis, HTN)  Multiple comorbitites as well     On admission serum creatinine 7.5  ELIZABETH appears to be multifactorial in setting of obstructive nephropathy, probably pyelonephritis, low effective circulating volume, possible AIN in setting of longstanding ABX (though daptomycin less likely)     Obstruction relieved by jackson placed 12/28/23 with 500 cc of purulent UOP. Supposedly follows with urology in outpatient setting. Is on tamsulosin.   Pyelonephritis with UA showing 3+ leuks with many bacteria. History of proteus in past.   Low effective circulating volume suggested by physical exam, evidence of acute liver injury in setting of HFpEF with severe aortic stenosis. Home medication included metolazone 1 mg bid     Dialysis consent obtained 12/28/23     Plan/Recommendation  No plans for HD today.   Continue abx (Ceftriaxone started 12/28/23)  Continue jackson and monitor UOP  -Strict ins and outs  -Avoid nephrotoxic agents if possible and renally dose medications  -Avoid drastic hemodynamic changes if possible

## 2024-01-01 NOTE — ASSESSMENT & PLAN NOTE
Patient with complete heart block, Previous EKG with first degree AV block and LBBB. Patient on metoprolol and amiodarone as an outpatient  Patient hemodynamically stable, but is unable to give a reliable history  Had an episode of Torsades [place] this am, and then sinus tachycardia with complete heart block  Evaluated by ID who recommended ATB therapy anticipated for 7 days     Recommendations:  -Admit to CICU  -TVP placement due to acute event  -Hold AV iker agents  -Can plan for leadless pacemaker when his infection clears (likely 48-72 hours on appropriate antimicrobial coverage)

## 2024-01-01 NOTE — NURSING
Notified Katarina. PA there is no BG check order but there is indulin sliding scale ordered, BG check order placed.

## 2024-01-01 NOTE — ASSESSMENT & PLAN NOTE
Likely multifactorial given CKD and infection. No signs of bleeding.  Has not received any PRBCs to date. Workup pending. Denies hematemesis, melena, hematochezia, other overt signs of bleeding     Current CBC reviewed-         Lab Results   Component Value Date     HGB 7.2 (L) 12/28/2023     HCT 22.9 (L) 12/28/2023      - Will obtain cnosent and transfuse if Hb < 7

## 2024-01-01 NOTE — ASSESSMENT & PLAN NOTE
Patient with complete heart block, previous EKG with first degree AV block and LBBB  Patient on metoprolol and amiodarone as an outpatient  HR in the 30s on admission, had improved to ~ 45   EP consulted, thought it could be 2/2 uremia/ELIZABETH/infection; held off on TVP initially  Stepped down to  with plans to see how he improved with dialysis  However went into torsades on 12/31  TVP placed by IC; EP planning to do PPM insertion

## 2024-01-01 NOTE — ASSESSMENT & PLAN NOTE
Suspect this patient has Complicated cystitis given retention and positive UA  UCx growing multi drug resistant E coli  ID following, started on ertapenem, anticipate 7 day course

## 2024-01-01 NOTE — CARE UPDATE
RAPID RESPONSE NURSE NOTE        Admit Date: 2023  LOS: 4  Code Status: Full Code   Date of Consult: 2024  : 1950  Age: 73 y.o.  Weight:   Wt Readings from Last 1 Encounters:   23 80.7 kg (178 lb)     Sex: male  Race: Black or    Bed: [place] [id number] A:   MRN: [id number]  Time Rapid Response Team page Received: 06  Time Rapid Response Team at Bedside: 0615  Time Rapid Response Team left Bedside: 07  Was the patient discharged from an ICU this admission? Yes   Was the patient discharged from a PACU within last 24 hours? No   Did the patient receive conscious sedation/general anesthesia in last 24 hours? No  Was the patient in the ED within the past 24 hours? No  Was the patient on NIPPV within the past 24 hours? No   Did this progress into an ARC or CPA: No  Attending Physician: Stephen Werner MD  Primary Service: [facility] CARDIOLOGY CCU       SITUATION    Notified by bedside RN via phone call.  Reason for alert: update on interventions performed prior to call. Nurse stated that as she was giving Heparin injection, pt went unresponsive and lost a pulse. She called the charge nurse in the room and performed CPR, at that time MD entered the room and detected a pulse. CPR ceased, pulse palpated. CXR, EKG, and electrolyte replacements were ordered.    Called to evaluate the patient for Dysrythmia    BACKGROUND     Why is the patient in the hospital?: Complete heart block    Patient has a past medical history of Acute congestive heart failure, Alcohol abuse, Arthritis, Asthma attack, Coronary artery disease, Diabetes mellitus, Hyperlipemia, Hypertension, Multiple thyroid nodules, and Rhabdomyolysis.    Last Vitals:  Temp: 99.5 °F (37.5 °C) (645)  Pulse: 49 (709)  Resp: 24 (645)  BP: 150/67 (645)  SpO2: 93 % (700)    24 Hours Vitals Range:  Temp:  [97.4 °F (36.3 °C)-99.5 °F (37.5 °C)]   Pulse:  [45-65]   Resp:  [14-24]   BP: (116-160)/(57-69)  "  SpO2:  [93 %-100 %]     Labs:  Recent Labs     12/31/23  0404 01/01/24  0420 01/01/24  0630   WBC 8.88 11.78 7.97   HGB 8.1* 7.7* 8.2*   HCT 25.2* 23.6* 25.7*    259 274       Recent Labs     12/31/23  0404 01/01/24  0420 01/01/24  0630    139 138   K 3.5 3.4* 3.5   CL 99 100 99   CO2 24 25 24   BUN 73* 84* 85*   CREATININE 4.4* 5.6* 5.5*   GLU 93 83 85   PHOS 3.9 4.3 4.4   MG 2.2 2.2 2.3        No results for input(s): "PH", "PCO2", "PO2", "HCO3", "POCSATURATED", "BE" in the last 72 hours.     ASSESSMENT    Physical Exam  Skin:     General: Skin is cool.   Neurological:      Mental Status: He is confused.      GCS: GCS eye subscore is 2. GCS verbal subscore is 2. GCS motor subscore is 4.         INTERVENTIONS    The patient was seen for Cardiac problem. Staff concerns included loss of pulse. The following interventions were performed: CBC, CMP, Magnesium, Troponin, portable chest x-ray, EKG, and CPR for <20 seconds.    RECOMMENDATIONS    We recommend: transferring to CICU for close monitoring.     PROVIDER ESCALATION    Orders received and case discussed with Dr. Ott .    Primary team arrival time: unknown d/t being at the bedside upon RRT arrival    Disposition: Tx in ICU bed 3078.    FOLLOW UP    Charge Aury COOK  updated on plan of care. Instructed to call the Rapid Response Nurse, Jenna Veloz RN at [phone] for additional questions or concerns.        "

## 2024-01-01 NOTE — PROCEDURES
01/01/2024  9:33 AM    Procedure:   TVP  Indication:   Torsades      Risks, benefits, and indications for the procedure were reviewed with patient. Consent was signed and placed in chart. Pt prepped and draped in sterile fashion. The following sterile precautions were followed: cap and mask, hand hygiene, sterile gown and sterile gloves, large sterile sheet and sterile field and 2% Chlorhexidine for cutaneous antisepsis. Time out was performed with nursing staff. Lidocaine 1% injected at site. Ultrasound guidance utilized to visualize anatomy. Access obtained without difficulty and blood flow was non-pulsatile. Wire threaded smoothly and US confirmed appropriate venous placement of wire. Catheter advanced without resistance. Pt tolerated procedure well. No evidence of hematoma at vascular access site. CXR has been ordered to confirm appropriate placement.     Juan Daniel Rincon MD  Cardiology Fellow  Pager: [phone]

## 2024-01-01 NOTE — NURSING
Notified Katarina POLANCO and Izzy. PA pt HR stays 40s o 60s, pt sleeping, Tele monitor shows A fib and SB back and forth. EKG ordered. 0340: EKG done, shows A fib, LBBB, team aware.

## 2024-01-01 NOTE — ASSESSMENT & PLAN NOTE
Last HbA1c 5.5  POCT TIDWM and prior to bed  LDSSI  Monitor BGLs, start long acting insulin if needed

## 2024-01-01 NOTE — PROGRESS NOTES
[facility]  Cardiac Electrophysiology  Progress Note    Admission Date: 12/28/2023  Code Status: Full Code   Attending Physician: Stephen Werner MD   Expected Discharge Date: 1/5/2024  Principal Problem:Complete heart block    Subjective:     Interval History: Getting hemodialysis, ID consulted and left recs regarding ATB therapy. Had an episode of unresponsiveness and TdP which resolved after 10-20 seconds of chest compressions. After that he was on sinus tachy with complete heart block.      sodium chloride 0.9%   Intravenous Once    sodium chloride 0.9%   Intravenous Once    acetaminophen  1,000 mg Oral Q8H    allopurinoL  50 mg Oral Daily    atorvastatin  80 mg Oral Daily    DAPTOmycin (CUBICIN) IV (PEDS and ADULTS)  8 mg/kg Intravenous Q48H    ertapenem (INVANZ) IVPB  500 mg Intravenous Q24H    famotidine  20 mg Oral Daily    folic acid  1 mg Oral Daily    heparin (porcine)  5,000 Units Subcutaneous Q8H    LIDOcaine  1 patch Transdermal Q24H    magnesium sulfate IVPB  1 g Intravenous Once    potassium chloride in water  20 mEq Intravenous Q2H       0.9%  NaCl infusion (for blood administration), sodium chloride 0.9%, dextrose 10%, dextrose 10%, glucagon (human recombinant), glucose, glucose, heparin (porcine), insulin aspart U-100, magnesium oxide, methocarbamoL, naloxone, ondansetron, senna-docusate 8.6-50 mg, sodium chloride 0.9%, sodium chloride 0.9%      Review of Systems   Unable to perform ROS: Mental status change     Objective:     Vital Signs (Most Recent):  Temp: 99.5 °F (37.5 °C) (01/01/24 0645)  Pulse: (!) 51 (01/01/24 0645)  Resp: (!) 24 (01/01/24 0645)  BP: (!) 150/67 (01/01/24 0645)  SpO2: (!) 93 % (01/01/24 0700) Vital Signs (24h Range):  Temp:  [97.4 °F (36.3 °C)-99.5 °F (37.5 °C)] 99.5 °F (37.5 °C)  Pulse:  [45-65] 51  Resp:  [14-24] 24  SpO2:  [93 %-100 %] 93 %  BP: (116-160)/(57-69) 150/67     Weight: 80.7 kg (178 lb)  Body mass index is 24.83 kg/m².     SpO2:  (!) 93 %    Telemetry : Complete heart block, HR 49. In the AM patient had an episode of TdP and then sinus tachy with CHB.     Physical Exam  Constitutional:       General: He is not in acute distress.     Appearance: He is ill-appearing.   HENT:      Mouth/Throat:      Mouth: Mucous membranes are dry.   Cardiovascular:      Rate and Rhythm: Bradycardia present.      Heart sounds: Murmur heard.   Pulmonary:      Effort: Pulmonary effort is normal.      Breath sounds: Normal breath sounds.   Musculoskeletal:      Right lower leg: No edema.      Left lower leg: No edema.   Skin:     General: Skin is warm.   Neurological:      Mental Status: He is disoriented.          Significant Labs:     Recent Labs   Lab 23  0404 24  0420 24  0630   WBC 8.88 11.78 7.97   HGB 8.1* 7.7* 8.2*   HCT 25.2* 23.6* 25.7*    259 274       Recent Labs   Lab 23  0404 24  0420 24  0630    139 138   K 3.5 3.4* 3.5   CL 99 100 99   CO2 24 25 24   BUN 73* 84* 85*   CREATININE 4.4* 5.6* 5.5*   CALCIUM 8.6* 8.9 9.1   PHOS 3.9 4.3 4.4       Recent Labs   Lab 23  0406 23  0404 24  0420   ALKPHOS 91 100 149*   BILITOT 0.6 0.6 0.7   PROT 6.5 6.6 6.2   ALT 82* 79* 62*   AST 61* 55* 48*           Recent Labs   Lab 23  2324 24  0630   *  --    TROPONINI  --  0.384*     Significant Imagin/29/23    Left Ventricle: The left ventricle is normal in size. Ventricular mass is normal. Normal wall thickness. There is concentric remodeling. Septal motion is consistent with bundle branch block. There is normal systolic function with a visually estimated ejection fraction of 55 - 60%. Grade II diastolic dysfunction.    Right Ventricle: Normal right ventricular cavity size. Systolic function is normal.    Left Atrium: Left atrium is severely dilated.    Aortic Valve: There is moderate aortic valve sclerosis. Moderately restricted motion. There is severe stenosis. Aortic  valve area by VTI is 0.86 cm². Aortic valve peak velocity is 4.12 m/s. Mean gradient is 43 mmHg. The dimensionless index is 0.22. There is mild to moderate aortic regurgitation.    Mitral Valve: There is bileaflet sclerosis. There is mild annular dilation present.    Tricuspid Valve: There is mild to moderate regurgitation.    Pulmonary Artery: The estimated pulmonary artery systolic pressure is 72 mmHg.    IVC/SVC: Intermediate venous pressure at 8 mmHg.  Assessment and Plan:     * Complete heart block  Patient with complete heart block, Previous EKG with first degree AV block and LBBB. Patient on metoprolol and amiodarone as an outpatient  Patient hemodynamically stable, but is unable to give a reliable history  Had an episode of [place] this am, and then sinus tachycardia with complete heart block  Evaluated by ID who recommended ATB therapy anticipated for 7 days     Recommendations:  -Admit to CICU  -TVP placement due to acute event  -Hold AV iker agents  -Can plan for leadless pacemaker when his infection clears (likely 48-72 hours on appropriate antimicrobial coverage)        Álvaro Andrade MD  Cardiac Electrophysiology  [facility]

## 2024-01-02 LAB
ABO + RH BLD: NORMAL
ALBUMIN SERPL BCP-MCNC: 1.9 G/DL (ref 3.5–5.2)
ALP SERPL-CCNC: 91 U/L (ref 55–135)
ALT SERPL W/O P-5'-P-CCNC: 55 U/L (ref 10–44)
ANION GAP SERPL CALC-SCNC: 20 MMOL/L (ref 8–16)
AST SERPL-CCNC: 40 U/L (ref 10–40)
BASOPHILS # BLD AUTO: 0.08 K/UL (ref 0–0.2)
BASOPHILS NFR BLD: 0.8 % (ref 0–1.9)
BILIRUB SERPL-MCNC: 0.5 MG/DL (ref 0.1–1)
BLD GP AB SCN CELLS X3 SERPL QL: NORMAL
BUN SERPL-MCNC: 88 MG/DL (ref 8–23)
CALCIUM SERPL-MCNC: 9.2 MG/DL (ref 8.7–10.5)
CHLORIDE SERPL-SCNC: 100 MMOL/L (ref 95–110)
CO2 SERPL-SCNC: 21 MMOL/L (ref 23–29)
CREAT SERPL-MCNC: 6.4 MG/DL (ref 0.5–1.4)
DIFFERENTIAL METHOD BLD: ABNORMAL
EOSINOPHIL # BLD AUTO: 0.3 K/UL (ref 0–0.5)
EOSINOPHIL NFR BLD: 3.2 % (ref 0–8)
ERYTHROCYTE [DISTWIDTH] IN BLOOD BY AUTOMATED COUNT: 18 % (ref 11.5–14.5)
EST. GFR  (NO RACE VARIABLE): 8.6 ML/MIN/1.73 M^2
GLUCOSE SERPL-MCNC: 75 MG/DL (ref 70–110)
HCT VFR BLD AUTO: 26.8 % (ref 40–54)
HGB BLD-MCNC: 8.1 G/DL (ref 14–18)
IMM GRANULOCYTES # BLD AUTO: 0.04 K/UL (ref 0–0.04)
IMM GRANULOCYTES NFR BLD AUTO: 0.4 % (ref 0–0.5)
LYMPHOCYTES # BLD AUTO: 0.8 K/UL (ref 1–4.8)
LYMPHOCYTES NFR BLD: 8.4 % (ref 18–48)
MAGNESIUM SERPL-MCNC: 3.5 MG/DL (ref 1.6–2.6)
MCH RBC QN AUTO: 24.5 PG (ref 27–31)
MCHC RBC AUTO-ENTMCNC: 30.2 G/DL (ref 32–36)
MCV RBC AUTO: 81 FL (ref 82–98)
MONOCYTES # BLD AUTO: 1.2 K/UL (ref 0.3–1)
MONOCYTES NFR BLD: 12.7 % (ref 4–15)
NEUTROPHILS # BLD AUTO: 7.2 K/UL (ref 1.8–7.7)
NEUTROPHILS NFR BLD: 74.5 % (ref 38–73)
NRBC BLD-RTO: 0 /100 WBC
PHOSPHATE SERPL-MCNC: 5 MG/DL (ref 2.7–4.5)
PLATELET # BLD AUTO: 292 K/UL (ref 150–450)
PMV BLD AUTO: 9.3 FL (ref 9.2–12.9)
POCT GLUCOSE: 73 MG/DL (ref 70–110)
POCT GLUCOSE: 78 MG/DL (ref 70–110)
POCT GLUCOSE: 83 MG/DL (ref 70–110)
POCT GLUCOSE: 90 MG/DL (ref 70–110)
POCT GLUCOSE: 97 MG/DL (ref 70–110)
POTASSIUM SERPL-SCNC: 3.7 MMOL/L (ref 3.5–5.1)
PROT SERPL-MCNC: 6.6 G/DL (ref 6–8.4)
RBC # BLD AUTO: 3.3 M/UL (ref 4.6–6.2)
SODIUM SERPL-SCNC: 141 MMOL/L (ref 136–145)
SPECIMEN OUTDATE: NORMAL
WBC # BLD AUTO: 9.68 K/UL (ref 3.9–12.7)

## 2024-01-02 PROCEDURE — 20000000 HC ICU ROOM

## 2024-01-02 PROCEDURE — 27000207 HC ISOLATION

## 2024-01-02 PROCEDURE — 63600175 PHARM REV CODE 636 W HCPCS

## 2024-01-02 PROCEDURE — 86901 BLOOD TYPING SEROLOGIC RH(D): CPT | Performed by: STUDENT IN AN ORGANIZED HEALTH CARE EDUCATION/TRAINING PROGRAM

## 2024-01-02 PROCEDURE — 25000003 PHARM REV CODE 250: Performed by: INTERNAL MEDICINE

## 2024-01-02 PROCEDURE — 25000003 PHARM REV CODE 250: Performed by: PHYSICIAN ASSISTANT

## 2024-01-02 PROCEDURE — 83735 ASSAY OF MAGNESIUM: CPT | Performed by: INTERNAL MEDICINE

## 2024-01-02 PROCEDURE — 63600175 PHARM REV CODE 636 W HCPCS: Performed by: PHYSICIAN ASSISTANT

## 2024-01-02 PROCEDURE — 99291 CRITICAL CARE FIRST HOUR: CPT | Mod: ,,, | Performed by: INTERNAL MEDICINE

## 2024-01-02 PROCEDURE — 84100 ASSAY OF PHOSPHORUS: CPT | Performed by: INTERNAL MEDICINE

## 2024-01-02 PROCEDURE — 63600175 PHARM REV CODE 636 W HCPCS: Performed by: STUDENT IN AN ORGANIZED HEALTH CARE EDUCATION/TRAINING PROGRAM

## 2024-01-02 PROCEDURE — 92610 EVALUATE SWALLOWING FUNCTION: CPT

## 2024-01-02 PROCEDURE — 94761 N-INVAS EAR/PLS OXIMETRY MLT: CPT

## 2024-01-02 PROCEDURE — 85025 COMPLETE CBC W/AUTO DIFF WBC: CPT | Performed by: INTERNAL MEDICINE

## 2024-01-02 PROCEDURE — 25000003 PHARM REV CODE 250: Performed by: STUDENT IN AN ORGANIZED HEALTH CARE EDUCATION/TRAINING PROGRAM

## 2024-01-02 PROCEDURE — 97535 SELF CARE MNGMENT TRAINING: CPT

## 2024-01-02 PROCEDURE — 99232 SBSQ HOSP IP/OBS MODERATE 35: CPT | Mod: FS,GC,, | Performed by: STUDENT IN AN ORGANIZED HEALTH CARE EDUCATION/TRAINING PROGRAM

## 2024-01-02 PROCEDURE — 80053 COMPREHEN METABOLIC PANEL: CPT | Performed by: INTERNAL MEDICINE

## 2024-01-02 RX ORDER — SODIUM CHLORIDE 9 MG/ML
INJECTION, SOLUTION INTRAVENOUS ONCE
Status: DISCONTINUED | OUTPATIENT
Start: 2024-01-02 | End: 2024-01-05

## 2024-01-02 RX ORDER — HYDRALAZINE HYDROCHLORIDE 20 MG/ML
10 INJECTION INTRAMUSCULAR; INTRAVENOUS ONCE
Status: COMPLETED | OUTPATIENT
Start: 2024-01-02 | End: 2024-01-02

## 2024-01-02 RX ORDER — POTASSIUM CHLORIDE 14.9 MG/ML
20 INJECTION INTRAVENOUS ONCE
Status: COMPLETED | OUTPATIENT
Start: 2024-01-02 | End: 2024-01-02

## 2024-01-02 RX ORDER — HYDROMORPHONE HYDROCHLORIDE 1 MG/ML
0.5 INJECTION, SOLUTION INTRAMUSCULAR; INTRAVENOUS; SUBCUTANEOUS EVERY 6 HOURS PRN
Status: DISCONTINUED | OUTPATIENT
Start: 2024-01-02 | End: 2024-01-07

## 2024-01-02 RX ADMIN — ERTAPENEM 500 MG: 1 INJECTION INTRAMUSCULAR; INTRAVENOUS at 11:01

## 2024-01-02 RX ADMIN — ACETAMINOPHEN 1000 MG: 500 TABLET ORAL at 09:01

## 2024-01-02 RX ADMIN — HYDRALAZINE HYDROCHLORIDE 10 MG: 20 INJECTION, SOLUTION INTRAMUSCULAR; INTRAVENOUS at 04:01

## 2024-01-02 RX ADMIN — HEPARIN SODIUM 5000 UNITS: 5000 INJECTION INTRAVENOUS; SUBCUTANEOUS at 03:01

## 2024-01-02 RX ADMIN — MUPIROCIN: 20 OINTMENT TOPICAL at 09:01

## 2024-01-02 RX ADMIN — HEPARIN SODIUM 5000 UNITS: 5000 INJECTION INTRAVENOUS; SUBCUTANEOUS at 09:01

## 2024-01-02 RX ADMIN — HEPARIN SODIUM 5000 UNITS: 5000 INJECTION INTRAVENOUS; SUBCUTANEOUS at 06:01

## 2024-01-02 RX ADMIN — ACETAMINOPHEN 1000 MG: 500 TABLET ORAL at 03:01

## 2024-01-02 RX ADMIN — POTASSIUM CHLORIDE 20 MEQ: 200 INJECTION, SOLUTION INTRAVENOUS at 09:01

## 2024-01-02 NOTE — CONSULTS
"Nutrition consult received stating "ICU admit".  RD following, please see note from 12/30 for full assessment.    Recommendations     1. Rec'd Diabetic/Renal diet       2. Add Novasource renal BID       3. RD to monitor and follow     Goals: Meet % EEN, EPN by RD f/u  Nutrition Goal Status: new  Communication of RD Recs:  (POC)    Thanks!  Magui, MS, RD, LDN   "

## 2024-01-02 NOTE — PROGRESS NOTES
[facility]  Nephrology  Progress Note    Patient Name: Chato Bowie  MRN: [id number]  Admission Date: 12/28/2023  Hospital Length of Stay: 5 days  Attending Provider: Stephen Werner MD   Primary Care Physician: Irlanda Valenzuela -  Principal Problem:Complete heart block    Subjective:     Interval History:   Last HD on 12/30 (2nd treatment) with 0 UF. No distress on exam. Appears to oxygenating well on 1-2 L NC. Electrolytes stable. sCr 6.4 from 5.5.  Net even. 24 hr  mL.     Review of patient's allergies indicates:  No Known Allergies  Current Facility-Administered Medications   Medication Frequency    0.9%  NaCl infusion (for blood administration) Q24H PRN    0.9%  NaCl infusion Continuous    0.9%  NaCl infusion PRN    0.9%  NaCl infusion Once    acetaminophen tablet 1,000 mg Q8H    allopurinol split tablet 50 mg Daily    atorvastatin tablet 80 mg Daily    DAPTOmycin (CUBICIN) 650 mg in sodium chloride 0.9% SolP 50 mL IVPB Q48H    dextrose 10% bolus 125 mL 125 mL PRN    dextrose 10% bolus 250 mL 250 mL PRN    ertapenem (INVANZ) 500 mg in sodium chloride 0.9% 100 mL IVPB Q24H    famotidine tablet 20 mg Daily    folic acid tablet 1 mg Daily    glucagon (human recombinant) injection 1 mg PRN    glucose chewable tablet 16 g PRN    glucose chewable tablet 24 g PRN    heparin (porcine) injection 1,000 Units PRN    heparin (porcine) injection 5,000 Units Q8H    HYDROmorphone injection 0.5 mg Q6H PRN    insulin aspart U-100 pen 0-5 Units QID (AC + HS) PRN    LIDOcaine 5 % patch 1 patch Q24H    magnesium oxide tablet 800 mg PRN    methocarbamoL tablet 500 mg QID PRN    mupirocin 2 % ointment BID    naloxone 0.4 mg/mL injection 0.02 mg PRN    senna-docusate 8.6-50 mg per tablet 2 tablet BID PRN    sodium chloride 0.9% bolus 250 mL 250 mL PRN    sodium chloride 0.9% flush 10 mL Q12H PRN       Objective:     Vital Signs (Most Recent):  Temp: 97.6 °F (36.4 °C) (01/02/24 1105)  Pulse: 80  (01/02/24 1405)  Resp: (!) 33 (01/02/24 1405)  BP: (!) 142/73 (01/02/24 1405)  SpO2: 95 % (01/02/24 1405) Vital Signs (24h Range):  Temp:  [97.2 °F (36.2 °C)-99 °F (37.2 °C)] 97.6 °F (36.4 °C)  Pulse:  [79-80] 80  Resp:  [13-52] 33  SpO2:  [94 %-100 %] 95 %  BP: (129-159)/(64-79) 142/73     Weight: 80.7 kg (178 lb) (12/29/23 0703)  Body mass index is 24.83 kg/m².  Body surface area is 2.01 meters squared.    I/O last 3 completed shifts:  In: 766 [P.O.:420; I.V.:199.1; IV Piggyback:146.9]  Out: 1325 [Urine:1325]     Physical Exam  Vitals and nursing note reviewed.   Constitutional:       General: He is not in acute distress.     Appearance: He is ill-appearing. He is not toxic-appearing.      Interventions: Nasal cannula in place.   HENT:      Head: Normocephalic and atraumatic.      Nose: Nose normal.      Mouth/Throat:      Mouth: Mucous membranes are dry.      Pharynx: No oropharyngeal exudate.   Eyes:      General: No scleral icterus.        Right eye: No discharge.         Left eye: No discharge.   Cardiovascular:      Rate and Rhythm: Bradycardia present.   Pulmonary:      Effort: Pulmonary effort is normal. No respiratory distress.      Breath sounds: No wheezing or rales.   Abdominal:      General: Abdomen is flat.   Musculoskeletal:      Cervical back: Normal range of motion. No rigidity.      Right lower leg: No edema.      Left lower leg: No edema.      Comments: Bilateral heels wrapped in ACE bandages.    Lymphadenopathy:      Cervical: No cervical adenopathy.   Skin:     Capillary Refill: Capillary refill takes less than 2 seconds.   Neurological:      Mental Status: He is alert and easily aroused.      Comments: Difficult to understand.   Psychiatric:         Behavior: Behavior is uncooperative.          Significant Labs:  CBC:   Recent Labs   Lab 01/02/24  0125   WBC 9.68   RBC 3.30*   HGB 8.1*   HCT 26.8*      MCV 81*   MCH 24.5*   MCHC 30.2*     CMP:   Recent Labs   Lab 01/02/24  0125   GLU 75    CALCIUM 9.2   ALBUMIN 1.9*   PROT 6.6      K 3.7   CO2 21*      BUN 88*   CREATININE 6.4*   ALKPHOS 91   ALT 55*   AST 40   BILITOT 0.5     All labs within the past 24 hours have been reviewed.     Assessment/Plan:     Cardiac/Vascular  * Complete heart block  - defer to primary team     Renal/  UTI (urinary tract infection)  See ELIZABETH    Acute renal failure superimposed on stage 3b chronic kidney disease  Baseline creatinine 2-2.5 (longstanding DM with bilateral osteomyelitis, HTN)  Multiple comorbitites as well     On admission serum creatinine 7.5  ELIZABETH appears to be multifactorial in setting of obstructive nephropathy, probably pyelonephritis, low effective circulating volume, possible AIN in setting of longstanding ABX (though daptomycin less likely)     Obstruction relieved by jackson placed 12/28/23 with 500 cc of purulent UOP. Supposedly follows with urology in outpatient setting. Is on tamsulosin.   Pyelonephritis with UA showing 3+ leuks with many bacteria. History of proteus in past.   Low effective circulating volume suggested by physical exam, evidence of acute liver injury in setting of HFpEF with severe aortic stenosis. Home medication included metolazone 1 mg bid     Dialysis consent obtained 12/28/23     Plan/Recommendation  Will plan for HD today (3rd treatment) for clearance and minium volume removal. Target UF 0.5-1 L as tolerated, keep MAP >65.  Plans for leadless PM when infection clears per notes, remain on IV abx (Ceftriaxone started 12/28/23)  Continue jackson and monitor UOP  -Strict ins and outs  -Avoid nephrotoxic agents if possible and renally dose medications  -Avoid drastic hemodynamic changes if possible        Thank you for your consult. I will follow-up with patient. Please contact us if you have any additional questions.    Haleigh Camp DNP, FNP-C  Nephrology  [facility]

## 2024-01-02 NOTE — PT/OT/SLP EVAL
Speech Language Pathology Evaluation  Bedside Swallow    Patient Name:  Chato Bowie   MRN:  [id number]  Admitting Diagnosis: Complete heart block    Recommendations:                 General Recommendations:   monitor diet tolerance/ongoing swallowing assessment  Diet recommendations:  Minced & Moist Diet - IDDSI Level 5, Thin liquids - IDDSI Level 0   Aspiration Precautions: 1 bite/sip at a time, Alternating bites/sips, Assistance with meals, Avoid talking while eating, Eliminate distractions, Feed only when awake/alert, Frequent oral care, HOB to 90 degrees, Meds crushed in puree, Monitor for s/s of aspiration, Small bites/sips, and Strict aspiration precautions   General Precautions: Standard, aspiration, fall  Communication strategies:  provide increased time to answer and go to room if call light pushed    Assessment:     Chato Bowie is a 73 y.o. male with an SLP diagnosis of Dysphagia.      History:     Past Medical History:   Diagnosis Date    Acute congestive heart failure 3/20/2020    Alcohol abuse     Arthritis     Asthma attack 11/24/2021    Coronary artery disease     Diabetes mellitus     Hyperlipemia     Hypertension     Multiple thyroid nodules 6/14/2023    Rhabdomyolysis        Past Surgical History:   Procedure Laterality Date    ECHOCARDIOGRAM,TRANSESOPHAGEAL N/A 9/21/2023    Procedure: Transesophageal echo (MARIA) intra-procedure log documentation;  Surgeon: Luisana Rodríguez MD;  Location: [facility] CATH LAB;  Service: Cardiology;  Laterality: N/A;    EYE SURGERY      IRRIGATION AND DEBRIDEMENT Bilateral 12/1/2023    Procedure: IRRIGATION AND DEBRIDEMENT;  Surgeon: Jenna Gutiérrez DPM;  Location: [facility] OR;  Service: Podiatry;  Laterality: Bilateral;  Request antibiotic beads    TRANSESOPHAGEAL ECHOCARDIOGRAM WITH POSSIBLE CARDIOVERSION (MARIA W/ POSS CARDIOVERSION) N/A 1/5/2023    Procedure: Transesophageal echo (MARIA) intra-procedure log documentation;  Surgeon: Indra Foote MD;  Location: [facility]  CATH LAB;  Service: Cardiology;  Laterality: N/A;    TRANSESOPHAGEAL ECHOCARDIOGRAPHY N/A 9/21/2023    Procedure: ECHOCARDIOGRAM, TRANSESOPHAGEAL;  Surgeon: Luisana Rodríguez MD;  Location: [facility] CATH LAB;  Service: Cardiology;  Laterality: N/A;    TREATMENT OF CARDIAC ARRHYTHMIA N/A 12/7/2020    Procedure: Cardioversion or Defibrillation;  Surgeon: Indra Foote MD;  Location: [facility] CATH LAB;  Service: Cardiology;  Laterality: N/A;    TREATMENT OF CARDIAC ARRHYTHMIA N/A 12/30/2020    Procedure: Cardioversion or Defibrillation;  Surgeon: Tyrone Steen MD;  Location: [facility] CATH LAB;  Service: Cardiology;  Laterality: N/A;    TREATMENT OF CARDIAC ARRHYTHMIA N/A 1/5/2023    Procedure: Cardioversion or Defibrillation;  Surgeon: Indra Foote MD;  Location: [facility] CATH LAB;  Service: Cardiology;  Laterality: N/A;    VASCULAR SURGERY       HPI:  73 year old male with DM2, HTN, CAD, HFpEF, HLD, CKD3b, and hx of alcohol use disorder with 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 On interview patient lethargic and disoriented and poor historian, history obtained via chart review.   In ED pt AF, bradycardic ~40's, RR in the mid 20's, BP ~110/50, satting 100% on 2L NC. Labs significant for Hb 7.2, ANC 8.3, Na 132, Cl 87, HCO3 23, AG 22, , Cr 7.5, phos 8.3, Albumin 1.9, AST 74, , Lipase 146. Patient with urinary retention and jackson placed with purulent 500ml output . EKG with complete heart block. Patient initially admitted to medicine but transferred to CCU given Heart block       Social History: Per documentation, pt came from [facility].     Prior Intubation HX:  none during this admission     Modified Barium Swallow: none on file    Chest X-Rays: 1/1/23: FINDINGS:  Support devices:Unchanged in  "satisfactory position.New left-sided central venous catheter with tip in the expected location of the right atrium.  Remainder are unchanged.  Appearance and unchanged.   The cardiomediastinal silhouette is stable.   Stable bilateral interstitial lung opacity.     Prior diet: currently NPO    Subjective     "My mouth is so dry." "My mouth is still sore."     Pain/Comfort:  Pain Rating 1: 0/10    Respiratory Status: Room air SPO2 97%    Objective:     Oral Musculature Evaluation  Oral Musculature: general weakness  Dentition: scattered dentition  Secretion Management: adequate  Mucosal Quality: dry, coated tongue  Volitional Cough: fair/adequate  Volitional Swallow: did not elicit upon command due to dry mucosa  Voice Prior to PO Intake: reduced intensity, increased after receiving water    Bedside Swallow Eval:   Consistencies Assessed:  Thin liquids ice chip x 1, 1/2 tsp x 1, full tsp x 1, cup sip x 1, multiple straw sips (approx 2oz)  Puree 1//2 tsp x 1, full tsp x 3  Solids 1/4 cracker x 1      Oral Phase:   Increased mastication time and mild lingual residue for regular solid    Pharyngeal Phase:   no overt clinical signs/symptoms of aspiration    Compensatory Strategies  Liquid wash for solid    Treatment: Education was provided to pt regarding role of SLP, purpose of swallowing assessment, diet recommendation, aspiration precautions, and SLP treatment plan and POC.  Pt demonstrated understanding of education provided, but will benefit from continued reinforcement.       Goals:   Multidisciplinary Problems       SLP Goals          Problem: SLP    Goal Priority Disciplines Outcome   SLP Goal     SLP    Description: Speech Language Pathology Goals  Goals expected to be met by 1/9:   1. Pt will tolerate the least restrictive diet without s/s of aspiration.                                Plan:     Patient to be seen:  3 x/week   Plan of Care expires:  02/01/24  Plan of Care reviewed with:  patient   SLP Follow-Up:  " Yes       Discharge recommendations:   (tbd)     Time Tracking:     SLP Treatment Date:   01/02/24  Speech Start Time:  1420  Speech Stop Time:  1437     Speech Total Time (min):  17 min    Billable Minutes: Eval Swallow and Oral Function 9 and Self Care/Home Management Training 8    01/02/2024

## 2024-01-02 NOTE — PROGRESS NOTES
[facility] - Cardiac Intensive Care  Cardiology  Progress Note    Patient Name: Chato Bowie  MRN: [id number]  Admission Date: 12/28/2023  Hospital Length of Stay: 5 days  Code Status: DNR   Attending Physician: Stephen Werner MD   Primary Care Physician: Irlanda Valenzuela -  Expected Discharge Date: 1/5/2024  Principal Problem:Complete heart block    Subjective:     Hospital Course:   Admitted for CHB which EP thought to be 2/2 uremia/ELIZABETH and infection. Has not required pacing and is HDS. Recommended holding AV iker agents. BUN and sCr newly elevated, seen by nephrology who started dialysis for clearance. Tolerated dialysis with plans to further dialyze. UCx growing GNR, on cefepime. Continued daptomycin for his BL heel OM. EP holding on PPM insertion for now, would like to see if he improves with dialysis; likely a better candidate for leadless pacemaker given his multiple infections. Consider ID consult for his multidrug resistant organisms and possible pyelonephritis to ensure good source control. Mildly delirious which could be 2/2 his uremia and infection, further assessing with CT head which was unremarkable. Stepped down to  but came back to CCU after he went into torsades on 12/31. CPR was initiated and Pt regained pulse and consciousness; bradycardic post event with HR ~50. Electrolytes replaced. TVP placed in CCU; spoke with SID who confirmed a DNR status but that she would want a pacemaker placed. EP consulted for PPM insertion. Had another episode of torsades (~ 2 mins) that became CHB and then was paced to NSR. TVP placed. EP planning for micra insertion once on abx for his UTI for 72 hours.     Interval History: TVP placed yesterday PM, did OK overnight. Remains confused, oriented to place but not time. EP planning for micra once on abx for 72 hours    Review of Systems   Unable to perform ROS: Mental status change     Objective:     Vital Signs (Most Recent):  Temp: 98.4 °F (36.9 °C) (01/02/24  0705)  Pulse: 79 (01/02/24 1005)  Resp: 16 (01/02/24 1005)  BP: (!) 144/77 (01/02/24 1005)  SpO2: 99 % (01/02/24 1005) Vital Signs (24h Range):  Temp:  [97.2 °F (36.2 °C)-99 °F (37.2 °C)] 98.4 °F (36.9 °C)  Pulse:  [43-80] 79  Resp:  [12-52] 16  SpO2:  [93 %-100 %] 99 %  BP: (126-159)/(60-79) 144/77     Weight: 80.7 kg (178 lb)  Body mass index is 24.83 kg/m².     SpO2: 99 %         Intake/Output Summary (Last 24 hours) at 1/2/2024 1153  Last data filed at 1/2/2024 1005  Gross per 24 hour   Intake 819.51 ml   Output 660 ml   Net 159.51 ml         Lines/Drains/Airways       Peripherally Inserted Central Catheter Line  Duration             PICC Double Lumen 12/07/23 1457 right brachial 25 days              Central Venous Catheter Line  Duration             Trialysis (Dialysis) Catheter 12/29/23 2117 right internal jugular 3 days     Introducer Single Lumen 01/01/24 0927 Internal Jugular Left 1 day              Drain  Duration                  Urethral Catheter 12/28/23 1600 Double-lumen 16 Fr. 4 days              Line  Duration                  Pacer Wires 01/01/24 0928 1 day                       Physical Exam  Vitals and nursing note reviewed.   Constitutional:       General: He is not in acute distress.     Appearance: He is ill-appearing.   HENT:      Mouth/Throat:      Mouth: Mucous membranes are dry.   Eyes:      Extraocular Movements: Extraocular movements intact.      Pupils: Pupils are equal, round, and reactive to light.   Cardiovascular:      Rate and Rhythm: Normal rate and regular rhythm.      Pulses: Normal pulses.      Heart sounds: Normal heart sounds. No murmur heard.  Pulmonary:      Effort: Pulmonary effort is normal.      Breath sounds: Normal breath sounds. No wheezing, rhonchi or rales.   Abdominal:      General: Bowel sounds are normal. There is no distension.      Palpations: Abdomen is soft.      Tenderness: There is no abdominal tenderness.   Musculoskeletal:         General: No tenderness.       Right lower leg: No edema.      Left lower leg: No edema.   Skin:     General: Skin is warm and dry.      Capillary Refill: Capillary refill takes less than 2 seconds.      Findings: No erythema or rash.   Neurological:      Mental Status: He is lethargic, disoriented and confused.            Significant Labs: CMP   Recent Labs   Lab 01/01/24  0420 01/01/24  0630 01/01/24  1133 01/02/24  0125    138 138 141   K 3.4* 3.5 4.0 3.7    99 99 100   CO2 25 24 24 21*   GLU 83 85 86 75   BUN 84* 85* 85* 88*   CREATININE 5.6* 5.5* 5.6* 6.4*   CALCIUM 8.9 9.1 8.8 9.2   PROT 6.2  --   --  6.6   ALBUMIN 1.8*  --   --  1.9*   BILITOT 0.7  --   --  0.5   ALKPHOS 149*  --   --  91   AST 48*  --   --  40   ALT 62*  --   --  55*   ANIONGAP 14 15 15 20*     , CBC   Recent Labs   Lab 01/01/24  0420 01/01/24  0630 01/02/24  0125   WBC 11.78 7.97 9.68   HGB 7.7* 8.2* 8.1*   HCT 23.6* 25.7* 26.8*    274 292     , All pertinent lab results from the last 24 hours have been reviewed., and   Recent Lab Results         01/02/24  0937   01/02/24  0125   01/01/24  2141        Albumin   1.9         ALP   91         ALT   55         Anion Gap   20         AST   40         Baso #   0.08         Basophil %   0.8         BILIRUBIN TOTAL   0.5  Comment: For infants and newborns, interpretation of results should be based  on gestational age, weight and in agreement with clinical  observations.    Premature Infant recommended reference ranges:  Up to 24 hours.............<8.0 mg/dL  Up to 48 hours............<12.0 mg/dL  3-5 days..................<15.0 mg/dL  6-29 days.................<15.0 mg/dL           BUN   88         Calcium   9.2         Chloride   100         CO2   21         Creatinine   6.4         Differential Method   Automated         eGFR   8.6         Eos #   0.3         Eosinophil %   3.2         Glucose   75         Gran # (ANC)   7.2         Gran %   74.5         Group & Rh   B POS         Hematocrit   26.8          Hemoglobin   8.1         Immature Grans (Abs)   0.04  Comment: Mild elevation in immature granulocytes is non specific and   can be seen in a variety of conditions including stress response,   acute inflammation, trauma and pregnancy. Correlation with other   laboratory and clinical findings is essential.           Immature Granulocytes   0.4         INDIRECT SANTA   NEG         Lymph #   0.8         Lymph %   8.4         Magnesium    3.5         MCH   24.5         MCHC   30.2         MCV   81         Mono #   1.2         Mono %   12.7         MPV   9.3         nRBC   0         Phosphorus Level   5.0         Platelet Count   292         POCT Glucose 78     106       Potassium   3.7         PROTEIN TOTAL   6.6         RBC   3.30         RDW   18.0         Sodium   141         Specimen Outdate   01/05/2024 23:59         WBC   9.68               Significant Imaging:  All imaging from the past 24 hours has been reviewed  Assessment and Plan:     * Complete heart block  Patient with complete heart block, previous EKG with first degree AV block and LBBB  Patient on metoprolol and amiodarone as an outpatient  HR in the 30s on admission, had improved to ~ 45   EP consulted, thought it could be 2/2 uremia/ELIZABETH/infection; held off on TVP initially  Stepped down to  with plans to see how he improved with dialysis  However went into torsades on 12/31  TVP placed by IC; EP planning to do PPM insertion    Torsades de pointes  Went into torsades overnight 12/31; HR went to 160-170, then Pt lost consciousness and pulse. CPR initiated, Pt regained consciousness and pulse then was transferred back to CCU  Received Mg and K although levels were not critical  Medications reviewed - no obvious meds that could be prolonging QTc  TVP placed by IC  Spoke with SID who confirmed DNR code status but she is happy for a PPM to be inserted  Had another episode of torsades on 1/1 for about 2 mins which then converted to CHB which he was  paced out of into a HR ~ 65; spoke with EP who advised to overpace him  Remain on telemetry  Keep K > 4, Mg > 2    Osteomyelitis of foot  Admitted on 11/27 from NH for b/l wounds. 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 Wound swab of R ulcer with MRSA.   s/p bone biopsy and debridement by podiatry   On 6 weeks of IV abx Dapto with end date end of 1/10/2024    Advanced care planning/counseling discussion  Cardiology team spoke with MPOPATRICIA over the phone who confirmed DNR status however advised that she would like a PPM inserted    Acute encephalopathy  Likely uremic encephalopathy in the setting of ARF with  and asterixis on exam. Infections (UTI and OM) could also be contributing  Remains disoriented but alertness improved, answers some questions appropriately. Oriented to place and month.   Currently able to protect airway but will continue to monitor  CT head unremarkable  Continue ertapenem for UTI  Continue daptomycin for OM  NPO until SLP review    Bradycardia  See CHB    UTI (urinary tract infection)  Suspect this patient has Complicated cystitis given retention and positive UA  UCx growing multi drug resistant E coli  ID following, started on ertapenem, anticipate 7 day course, last day 1/6    Paroxysmal atrial flutter  On Eliquis at home. Unknown last dose  - Will hold off restating due to likely PPM insertion    Severe aortic valve stenosis  TTE:  Left Ventricle: The left ventricle is normal in size. Ventricular mass is normal. Normal wall thickness. There is concentric remodeling. Septal motion is consistent with bundle branch block. There is normal systolic function with a visually estimated ejection fraction of 55 - 60%. Grade II diastolic dysfunction.    Right Ventricle: Normal right ventricular cavity size. Systolic function is normal.    Left Atrium: Left atrium is severely dilated.    Aortic Valve: There is moderate aortic valve sclerosis. Moderately restricted motion. There is severe  stenosis. Aortic valve area by VTI is 0.86 cm². Aortic valve peak velocity is 4.12 m/s. Mean gradient is 43 mmHg. The dimensionless index is 0.22. There is mild to moderate aortic regurgitation.    Mitral Valve: There is bileaflet sclerosis. There is mild annular dilation present.    Tricuspid Valve: There is mild to moderate regurgitation.    Pulmonary Artery: The estimated pulmonary artery systolic pressure is 72 mmHg.    IVC/SVC: Intermediate venous pressure at 8 mmHg.    Acute renal failure superimposed on stage 3b chronic kidney disease  Admitted with ELIZABETH on CKD  Initial  with sCr 7.5  Nephrology consulted, started on HD for clearance  Still making some urine  Dialysis per nephro    GERD (gastroesophageal reflux disease)  Continue famotidine    Anemia  Likely multifactorial given CKD and infection. No signs of bleeding.  Has not received any PRBCs to date. Workup pending. Denies hematemesis, melena, hematochezia, other overt signs of bleeding     Current CBC reviewed-         Lab Results   Component Value Date     HGB 7.2 (L) 12/28/2023     HCT 22.9 (L) 12/28/2023      - Will obtain cnosent and transfuse if Hb < 7    Type 2 diabetes mellitus with kidney complication, with long-term current use of insulin  Last HbA1c 5.5  POCT TIDWM and prior to bed  LDSSI  Monitor BGLs, start long acting insulin if needed    Dyslipidemia  Continue statin    Epigastric abdominal pain  CTAP showing cholelithiasis, no evidence of acute cholecystitis, anasarca      VTE Risk Mitigation (From admission, onward)           Ordered     heparin (porcine) injection 1,000 Units  As needed (PRN)         12/30/23 1056     heparin (porcine) injection 5,000 Units  Every 8 hours         12/29/23 1321     IP VTE HIGH RISK PATIENT  Once         12/28/23 1719     Place sequential compression device  Until discontinued         12/28/23 1719                  Marianna Cullen MD  Cardiology  [facility] - Cardiac Intensive Care

## 2024-01-02 NOTE — SUBJECTIVE & OBJECTIVE
Interval History:   Last HD on 12/30 (2nd treatment) with 0 UF. No distress on exam. Appears to oxygenating well on 1-2 L NC. Electrolytes stable. sCr 6.4 from 5.5.  Net even. 24 hr  mL.     Review of patient's allergies indicates:  No Known Allergies  Current Facility-Administered Medications   Medication Frequency    0.9%  NaCl infusion (for blood administration) Q24H PRN    0.9%  NaCl infusion Continuous    0.9%  NaCl infusion PRN    0.9%  NaCl infusion Once    acetaminophen tablet 1,000 mg Q8H    allopurinol split tablet 50 mg Daily    atorvastatin tablet 80 mg Daily    DAPTOmycin (CUBICIN) 650 mg in sodium chloride 0.9% SolP 50 mL IVPB Q48H    dextrose 10% bolus 125 mL 125 mL PRN    dextrose 10% bolus 250 mL 250 mL PRN    ertapenem (INVANZ) 500 mg in sodium chloride 0.9% 100 mL IVPB Q24H    famotidine tablet 20 mg Daily    folic acid tablet 1 mg Daily    glucagon (human recombinant) injection 1 mg PRN    glucose chewable tablet 16 g PRN    glucose chewable tablet 24 g PRN    heparin (porcine) injection 1,000 Units PRN    heparin (porcine) injection 5,000 Units Q8H    HYDROmorphone injection 0.5 mg Q6H PRN    insulin aspart U-100 pen 0-5 Units QID (AC + HS) PRN    LIDOcaine 5 % patch 1 patch Q24H    magnesium oxide tablet 800 mg PRN    methocarbamoL tablet 500 mg QID PRN    mupirocin 2 % ointment BID    naloxone 0.4 mg/mL injection 0.02 mg PRN    senna-docusate 8.6-50 mg per tablet 2 tablet BID PRN    sodium chloride 0.9% bolus 250 mL 250 mL PRN    sodium chloride 0.9% flush 10 mL Q12H PRN       Objective:     Vital Signs (Most Recent):  Temp: 97.6 °F (36.4 °C) (01/02/24 1105)  Pulse: 80 (01/02/24 1405)  Resp: (!) 33 (01/02/24 1405)  BP: (!) 142/73 (01/02/24 1405)  SpO2: 95 % (01/02/24 1405) Vital Signs (24h Range):  Temp:  [97.2 °F (36.2 °C)-99 °F (37.2 °C)] 97.6 °F (36.4 °C)  Pulse:  [79-80] 80  Resp:  [13-52] 33  SpO2:  [94 %-100 %] 95 %  BP: (129-159)/(64-79) 142/73     Weight: 80.7 kg (178 lb)  (12/29/23 0703)  Body mass index is 24.83 kg/m².  Body surface area is 2.01 meters squared.    I/O last 3 completed shifts:  In: 766 [P.O.:420; I.V.:199.1; IV Piggyback:146.9]  Out: 1325 [Urine:1325]     Physical Exam  Vitals and nursing note reviewed.   Constitutional:       General: He is not in acute distress.     Appearance: He is ill-appearing. He is not toxic-appearing.      Interventions: Nasal cannula in place.   HENT:      Head: Normocephalic and atraumatic.      Nose: Nose normal.      Mouth/Throat:      Mouth: Mucous membranes are dry.      Pharynx: No oropharyngeal exudate.   Eyes:      General: No scleral icterus.        Right eye: No discharge.         Left eye: No discharge.   Cardiovascular:      Rate and Rhythm: Bradycardia present.   Pulmonary:      Effort: Pulmonary effort is normal. No respiratory distress.      Breath sounds: No wheezing or rales.   Abdominal:      General: Abdomen is flat.   Musculoskeletal:      Cervical back: Normal range of motion. No rigidity.      Right lower leg: No edema.      Left lower leg: No edema.      Comments: Bilateral heels wrapped in ACE bandages.    Lymphadenopathy:      Cervical: No cervical adenopathy.   Skin:     Capillary Refill: Capillary refill takes less than 2 seconds.   Neurological:      Mental Status: He is alert and easily aroused.      Comments: Difficult to understand.   Psychiatric:         Behavior: Behavior is uncooperative.          Significant Labs:  CBC:   Recent Labs   Lab 01/02/24  0125   WBC 9.68   RBC 3.30*   HGB 8.1*   HCT 26.8*      MCV 81*   MCH 24.5*   MCHC 30.2*     CMP:   Recent Labs   Lab 01/02/24  0125   GLU 75   CALCIUM 9.2   ALBUMIN 1.9*   PROT 6.6      K 3.7   CO2 21*      BUN 88*   CREATININE 6.4*   ALKPHOS 91   ALT 55*   AST 40   BILITOT 0.5     All labs within the past 24 hours have been reviewed.

## 2024-01-02 NOTE — SUBJECTIVE & OBJECTIVE
Interval History: Had an episode of unresponsiveness yesterday and TdP which resolved after 10-20 seconds of chest compressions. After that he was sinus with complete heart block. TVP was placed and he has been paced at a rate of 80 overnight without further events.      sodium chloride 0.9%   Intravenous Once    sodium chloride 0.9%   Intravenous Once    acetaminophen  1,000 mg Oral Q8H    allopurinoL  50 mg Oral Daily    atorvastatin  80 mg Oral Daily    DAPTOmycin (CUBICIN) IV (PEDS and ADULTS)  8 mg/kg Intravenous Q48H    ertapenem (INVANZ) IVPB  500 mg Intravenous Q24H    famotidine  20 mg Oral Daily    folic acid  1 mg Oral Daily    heparin (porcine)  5,000 Units Subcutaneous Q8H    LIDOcaine  1 patch Transdermal Q24H    mupirocin   Nasal BID       0.9%  NaCl infusion (for blood administration), sodium chloride 0.9%, dextrose 10%, dextrose 10%, glucagon (human recombinant), glucose, glucose, heparin (porcine), HYDROmorphone, insulin aspart U-100, magnesium oxide, methocarbamoL, naloxone, senna-docusate 8.6-50 mg, sodium chloride 0.9%, sodium chloride 0.9%      Review of Systems   Unable to perform ROS: Mental status change     Objective:     Vital Signs (Most Recent):  Temp: 97.8 °F (36.6 °C) (01/02/24 0302)  Pulse: 79 (01/02/24 0602)  Resp: 18 (01/02/24 0602)  BP: 137/68 (01/02/24 0602)  SpO2: 100 % (01/02/24 0602) Vital Signs (24h Range):  Temp:  [97.2 °F (36.2 °C)-99 °F (37.2 °C)] 97.8 °F (36.6 °C)  Pulse:  [43-80] 79  Resp:  [12-52] 18  SpO2:  [93 %-100 %] 100 %  BP: (126-159)/(57-79) 137/68     Weight: 80.7 kg (178 lb)  Body mass index is 24.83 kg/m².     SpO2: 100 %    Telemetry : Complete heart block, HR 49. In the AM patient had an episode of TdP and then sinus tachy with CHB.     Physical Exam  Constitutional:       General: He is not in acute distress.     Appearance: He is ill-appearing.   HENT:      Mouth/Throat:      Mouth: Mucous membranes are dry.   Cardiovascular:      Rate and Rhythm: Normal  rate.      Heart sounds: Murmur heard.      Comments: TVP-paced at 80  Pulmonary:      Effort: Pulmonary effort is normal.      Breath sounds: Normal breath sounds.   Musculoskeletal:      Right lower leg: No edema.      Left lower leg: No edema.   Skin:     General: Skin is warm.   Neurological:      Mental Status: He is disoriented.          Significant Labs:     Recent Labs   Lab 24  0420 24  0630 24  0125   WBC 11.78 7.97 9.68   HGB 7.7* 8.2* 8.1*   HCT 23.6* 25.7* 26.8*    274 292         Recent Labs   Lab 24  0420 24  0630 24  1133 24  0125    138 138 141   K 3.4* 3.5 4.0 3.7    99 99 100   CO2  21*   BUN 84* 85* 85* 88*   CREATININE 5.6* 5.5* 5.6* 6.4*   CALCIUM 8.9 9.1 8.8 9.2   PHOS 4.3 4.4  --  5.0*         Recent Labs   Lab 23  0404 24  0420 24  0125   ALKPHOS 100 149* 91   BILITOT 0.6 0.7 0.5   PROT 6.6 6.2 6.6   ALT 79* 62* 55*   AST 55* 48* 40             Recent Labs   Lab 23  2324 24  0630   *  --    TROPONINI  --  0.384*       Significant Imagin/29/23    Left Ventricle: The left ventricle is normal in size. Ventricular mass is normal. Normal wall thickness. There is concentric remodeling. Septal motion is consistent with bundle branch block. There is normal systolic function with a visually estimated ejection fraction of 55 - 60%. Grade II diastolic dysfunction.    Right Ventricle: Normal right ventricular cavity size. Systolic function is normal.    Left Atrium: Left atrium is severely dilated.    Aortic Valve: There is moderate aortic valve sclerosis. Moderately restricted motion. There is severe stenosis. Aortic valve area by VTI is 0.86 cm². Aortic valve peak velocity is 4.12 m/s. Mean gradient is 43 mmHg. The dimensionless index is 0.22. There is mild to moderate aortic regurgitation.    Mitral Valve: There is bileaflet sclerosis. There is mild annular dilation present.    Tricuspid  Valve: There is mild to moderate regurgitation.    Pulmonary Artery: The estimated pulmonary artery systolic pressure is 72 mmHg.    IVC/SVC: Intermediate venous pressure at 8 mmHg.

## 2024-01-02 NOTE — ASSESSMENT & PLAN NOTE
Patient with complete heart block, Previous EKG with first degree AV block and LBBB. Patient on metoprolol and amiodarone as an outpatient  Patient hemodynamically stable, but is unable to give a reliable history  Had an episode of Torsades de Pointes this am, and then sinus tachycardia with complete heart block  Evaluated by ID who recommended ATB therapy anticipated for 7 days   DNR, no ICD placement    Recommendations:  -TVP placement due to acute event  -Hold AV iker agents  -Primary discussing hospice and goals of care with family  -He is being paced effectively by TVP. If they decide to move forward, we can discuss Micra placement with some clinical improvement

## 2024-01-02 NOTE — ASSESSMENT & PLAN NOTE
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 Infections (UTI and OM) could also be contributing  Remains disoriented but alertness improved, answers some questions appropriately. Oriented to place and month.   Currently able to protect airway but will continue to monitor  CT head unremarkable  Continue ertapenem for UTI  Continue daptomycin for OM  NPO until SLP review

## 2024-01-02 NOTE — ASSESSMENT & PLAN NOTE
TTE:  Left Ventricle: The left ventricle is normal in size. Ventricular mass is normal. Normal wall thickness. There is concentric remodeling. Septal motion is consistent with bundle branch block. There is normal systolic function with a visually estimated ejection fraction of 55 - 60%. Grade II diastolic dysfunction.    Right Ventricle: Normal right ventricular cavity size. Systolic function is normal.    Left Atrium: Left atrium is severely dilated.    Aortic Valve: There is moderate aortic valve sclerosis. Moderately restricted motion. There is severe stenosis. Aortic valve area by VTI is 0.86 cm². Aortic valve peak velocity is 4.12 m/s. Mean gradient is 43 mmHg. The dimensionless index is 0.22. There is mild to moderate aortic regurgitation.    Mitral Valve: There is bileaflet sclerosis. There is mild annular dilation present.    Tricuspid Valve: There is mild to moderate regurgitation.    Pulmonary Artery: The estimated pulmonary artery systolic pressure is 72 mmHg.    IVC/SVC: Intermediate venous pressure at 8 mmHg.

## 2024-01-02 NOTE — ASSESSMENT & PLAN NOTE
Patient with complete heart block, Previous EKG with first degree AV block and LBBB. Patient on metoprolol and amiodarone as an outpatient  Patient hemodynamically stable, but is unable to give a reliable history  Had an episode of Torsades de Pointes this am, and then sinus tachycardia with complete heart block  Evaluated by ID who recommended ATB therapy anticipated for 7 days     Recommendations:  -TVP placement due to acute event  -Hold AV iker agents  -Plan for Micra once infection clears, possibly later this week

## 2024-01-02 NOTE — ASSESSMENT & PLAN NOTE
Admitted with ELIZABETH on CKD  Initial  with sCr 7.5  Nephrology consulted, started on HD for clearance  Still making some urine  Dialysis per nephro

## 2024-01-02 NOTE — PROGRESS NOTES
[facility] - Cardiac Intensive Care  Cardiac Electrophysiology  Progress Note    Admission Date: 12/28/2023  Code Status: DNR   Attending Physician: Stephen Werner MD   Expected Discharge Date: 1/5/2024  Principal Problem:Complete heart block    Subjective:     Interval History: Had an episode of unresponsiveness yesterday and TdP which resolved after 10-20 seconds of chest compressions. After that he was sinus with complete heart block. TVP was placed and he has been paced at a rate of 80 overnight without further events.      sodium chloride 0.9%   Intravenous Once    sodium chloride 0.9%   Intravenous Once    acetaminophen  1,000 mg Oral Q8H    allopurinoL  50 mg Oral Daily    atorvastatin  80 mg Oral Daily    DAPTOmycin (CUBICIN) IV (PEDS and ADULTS)  8 mg/kg Intravenous Q48H    ertapenem (INVANZ) IVPB  500 mg Intravenous Q24H    famotidine  20 mg Oral Daily    folic acid  1 mg Oral Daily    heparin (porcine)  5,000 Units Subcutaneous Q8H    LIDOcaine  1 patch Transdermal Q24H    mupirocin   Nasal BID       0.9%  NaCl infusion (for blood administration), sodium chloride 0.9%, dextrose 10%, dextrose 10%, glucagon (human recombinant), glucose, glucose, heparin (porcine), HYDROmorphone, insulin aspart U-100, magnesium oxide, methocarbamoL, naloxone, senna-docusate 8.6-50 mg, sodium chloride 0.9%, sodium chloride 0.9%      Review of Systems   Unable to perform ROS: Mental status change     Objective:     Vital Signs (Most Recent):  Temp: 97.8 °F (36.6 °C) (01/02/24 0302)  Pulse: 79 (01/02/24 0602)  Resp: 18 (01/02/24 0602)  BP: 137/68 (01/02/24 0602)  SpO2: 100 % (01/02/24 0602) Vital Signs (24h Range):  Temp:  [97.2 °F (36.2 °C)-99 °F (37.2 °C)] 97.8 °F (36.6 °C)  Pulse:  [43-80] 79  Resp:  [12-52] 18  SpO2:  [93 %-100 %] 100 %  BP: (126-159)/(57-79) 137/68     Weight: 80.7 kg (178 lb)  Body mass index is 24.83 kg/m².     SpO2: 100 %    Telemetry : Complete heart block, HR 49. In the AM patient had an episode  of TdP and then sinus tachy with CHB.     Physical Exam  Constitutional:       General: He is not in acute distress.     Appearance: He is ill-appearing.   HENT:      Mouth/Throat:      Mouth: Mucous membranes are dry.   Cardiovascular:      Rate and Rhythm: Normal rate.      Heart sounds: Murmur heard.      Comments: TVP-paced at 80  Pulmonary:      Effort: Pulmonary effort is normal.      Breath sounds: Normal breath sounds.   Musculoskeletal:      Right lower leg: No edema.      Left lower leg: No edema.   Skin:     General: Skin is warm.   Neurological:      Mental Status: He is disoriented.          Significant Labs:     Recent Labs   Lab 24  0420 24  0630 24  0125   WBC 11.78 7.97 9.68   HGB 7.7* 8.2* 8.1*   HCT 23.6* 25.7* 26.8*    274 292         Recent Labs   Lab 24  0420 24  0630 24  1133 24  0125    138 138 141   K 3.4* 3.5 4.0 3.7    99 99 100   CO2  21*   BUN 84* 85* 85* 88*   CREATININE 5.6* 5.5* 5.6* 6.4*   CALCIUM 8.9 9.1 8.8 9.2   PHOS 4.3 4.4  --  5.0*         Recent Labs   Lab 23  0404 24  0420 24  0125   ALKPHOS 100 149* 91   BILITOT 0.6 0.7 0.5   PROT 6.6 6.2 6.6   ALT 79* 62* 55*   AST 55* 48* 40             Recent Labs   Lab 23  2324 24  0630   *  --    TROPONINI  --  0.384*       Significant Imagin/29/23    Left Ventricle: The left ventricle is normal in size. Ventricular mass is normal. Normal wall thickness. There is concentric remodeling. Septal motion is consistent with bundle branch block. There is normal systolic function with a visually estimated ejection fraction of 55 - 60%. Grade II diastolic dysfunction.    Right Ventricle: Normal right ventricular cavity size. Systolic function is normal.    Left Atrium: Left atrium is severely dilated.    Aortic Valve: There is moderate aortic valve sclerosis. Moderately restricted motion. There is severe stenosis. Aortic valve area  by VTI is 0.86 cm². Aortic valve peak velocity is 4.12 m/s. Mean gradient is 43 mmHg. The dimensionless index is 0.22. There is mild to moderate aortic regurgitation.    Mitral Valve: There is bileaflet sclerosis. There is mild annular dilation present.    Tricuspid Valve: There is mild to moderate regurgitation.    Pulmonary Artery: The estimated pulmonary artery systolic pressure is 72 mmHg.    IVC/SVC: Intermediate venous pressure at 8 mmHg.  Assessment and Plan:     * Complete heart block  Patient with complete heart block, Previous EKG with first degree AV block and LBBB. Patient on metoprolol and amiodarone as an outpatient  Patient hemodynamically stable, but is unable to give a reliable history  Had an episode of Torsades de Pointes this am, and then sinus tachycardia with complete heart block  Evaluated by ID who recommended ATB therapy anticipated for 7 days   DNR, no ICD placement    Recommendations:  -TVP placement due to acute event  -Hold AV iker agents  -Plan for Micra once infection clears, possibly later this week        Connor M Gillies, MD  Cardiac Electrophysiology  [facility] - Cardiac Intensive Care

## 2024-01-02 NOTE — ASSESSMENT & PLAN NOTE
Went into torsades overnight 12/31; HR went to 160-170, then Pt lost consciousness and pulse. CPR initiated, Pt regained consciousness and pulse then was transferred back to CCU  Received Mg and K although levels were not critical  Medications reviewed - no obvious meds that could be prolonging QTc  TVP placed by IC  Spoke with MPOA who confirmed DNR code status but she is happy for a PPM to be inserted  Had another episode of torsades on 1/1 for about 2 mins which then converted to CHB which he was paced out of into a HR ~ 65; spoke with EP who advised to overpace him  Remain on telemetry  Keep K > 4, Mg > 2

## 2024-01-02 NOTE — CONSULTS
[facility] - Cardiac Int[facility] Care  Wound Care    Patient Name:  Chato Bowie   MRN:  [id number]  Date: 1/2/2024  Diagnosis: Complete heart block    History:     Past Medical History:   Diagnosis Date    Acute congestive heart failure 3/20/2020    Alcohol abuse     Arthritis     Asthma attack 11/24/2021    Coronary artery disease     Diabetes mellitus     Hyperlipemia     Hypertension     Multiple thyroid nodules 6/14/2023    Rhabdomyolysis        Social History     Socioeconomic History    Marital status: Single   Tobacco Use    Smoking status: Never    Smokeless tobacco: Never   Substance and Sexual Activity    Alcohol use: Not Currently     Alcohol/week: 6.0 standard drinks of alcohol     Types: 6 Cans of beer per week    Drug use: No    Sexual activity: Yes     Partners: Female     Social Determinants of Health     Financial Resource Strain: Medium Risk (6/15/2023)    Overall Financial Resource Strain (CARDIA)     Difficulty of Paying Living Expenses: Somewhat hard   Food Insecurity: No Food Insecurity (6/15/2023)    Hunger Vital Sign     Worried About Running Out of Food in the Last Year: Never true     Ran Out of Food in the Last Year: Never true   Transportation Needs: No Transportation Needs (6/15/2023)    PRAPARE - Transportation     Lack of Transportation (Medical): No     Lack of Transportation (Non-Medical): No   Physical Activity: Inactive (6/15/2023)    Exercise Vital Sign     Days of Exercise per Week: 0 days     Minutes of Exercise per Session: 0 min   Stress: Stress Concern Present (6/15/2023)    [employer] [employer] - Occupational Stress Questionnaire     Feeling of Stress : To some extent   Social Connections: Socially Isolated (6/15/2023)    Social Connection and Isolation Panel [NHANES]     Frequency of Communication with Friends and Family: Once a week     Frequency of Social Gatherings with Friends and Family: Once a week     Attends Voodoo Services: Never     Active  Member of Clubs or Organizations: No     Attends Club or Organization Meetings: Never     Marital Status: Never    Housing Stability: Low Risk  (6/15/2023)    Housing Stability Vital Sign     Unable to Pay for Housing in the Last Year: No     Number of Places Lived in the Last Year: 1     Unstable Housing in the Last Year: No       Precautions:     Allergies as of 12/28/2023    (No Known Allergies)       [facility] Assessment Details/Treatment   Patient seen for wound care consultation to [facility].   Reviewed chart for this encounter.   See Flow Sheet for findings.      Per chart review. 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 Wound care applied Povidone Iodine to the left lateral foot and heel with a foam border dressing to protect it. Wound care applied Iodine to the right lateral heel for its antiseptic properties. Wound care applied Woun'Dres to the right heel to maintain a moist wound environment. A foam was applied as a cover dressing. Wound care noted moist breakdown to buttocks. Wound care applied a Mepilex to the buttocks for protection. Immerse bed in use at this time.      RECOMMENDATIONS:  - IP Skin Integrity CHAD  - Heel lift boots  - Bedside nurse to perform wound care to left and right lateral foot and left heel:  Apply Povidone Iodine to wound bed  Cover with a foam border (Mepilex)   Apply this Daily.  - Bedside nurse to perform wound care to right heel:  Cleanse wound with normal saline  Pat dry.  Apply a collagen hydrogel (Woun'Dres) to wound bed  Cover with a foam border (Mepilex   Apply this every two days.  - Bedside nurse to perform wound care to buttocks:  Cleanse wound with soap and water  Pat dry.  Apply a foam  border (Mepilex) wound bed    Apply this weekly.    Recommendations made to primary team for above plan via secured chat. Wound Care to follow up. Orders placed.     Discussed POC with patient and primary RN.   See EMR for orders & patient education.  Discussed POC with primary team.    Nursing to continue care.  Nursing to maintain pressure injury prevention interventions.  Contact wound care for any further questions.         01/02/24 1540   WOCN Assessment   WOCN Total Time (mins) 45   Visit Date 01/02/24   Visit Time 1540   Consult Type New   WOCN Speciality Wound   Intervention assessed;changed;applied;chart review;coordination of care;orders   Teaching on-going        Altered Skin Integrity 12/28/23 1629 lower Thoracic spine Ulceration Full thickness tissue loss. Subcutaneous fat may be visible but bone, tendon or muscle are not exposed   Date First Assessed/Time First Assessed: 12/28/23 1629   Altered Skin Integrity Present on Admission - Did Patient arrive to the hospital with altered skin?: yes  Orientation: lower  Location: Thoracic spine  Is this injury device related?: No  Primar...   Wound Image    Dressing Appearance Dry;Intact;Clean   Drainage Amount Scant   Drainage Characteristics/Odor Serosanguineous   Appearance Pink;Red;Moist;Yellow   Tissue loss description Full thickness   Care Cleansed with:;Soap and water   Dressing Applied;Foam        Altered Skin Integrity 12/29/23 2100 Right anterior Foot   Date First Assessed/Time First Assessed: 12/29/23 2100   Altered Skin Integrity Present on Admission - Did Patient arrive to the hospital with altered skin?: yes  Side: Right  Orientation: anterior  Location: Foot   Wound Image    Dressing Appearance Dry;Intact;Clean   Drainage Amount None   Appearance Intact;Black;Dry   Care Applied:;Povidone iodine   Dressing Applied;Foam   Dressing Change Due 01/03/24        Altered Skin Integrity 12/29/23 2100 Left anterior Foot   Date First Assessed/Time First  Assessed: 12/29/23 2100   Altered Skin Integrity Present on Admission - Did Patient arrive to the hospital with altered skin?: yes  Side: Left  Orientation: anterior  Location: Foot   Wound Image    Dressing Appearance Dry;Intact;Clean   Drainage Amount None   Appearance Intact;Black;Dry   Care Applied:;Povidone iodine   Dressing Applied;Foam   Dressing Change Due 01/03/24        Altered Skin Integrity 09/19/23 2340 Left Heel Full thickness tissue loss. Base is covered by slough and/or eschar in the wound bed   Date First Assessed/Time First Assessed: 09/19/23 2340   Altered Skin Integrity Present on Admission - Did Patient arrive to the hospital with altered skin?: yes  Side: Left  Location: Heel  Description of Altered Skin Integrity: Full thickness tissue...   Wound Image    Dressing Appearance Dry;Intact;Clean   Drainage Amount None   Appearance Intact;Black;Eschar;Dry   Care Applied:;Povidone iodine   Dressing Applied;Foam   Dressing Change Due 01/03/24        Altered Skin Integrity 11/27/23 1832 Right Heel Full thickness tissue loss. Base is covered by slough and/or eschar in the wound bed   Date First Assessed/Time First Assessed: 11/27/23 1832   Altered Skin Integrity Present on Admission - Did Patient arrive to the hospital with altered skin?: yes  Side: Right  Location: Heel  Description of Altered Skin Integrity: Full thickness tissu...   Wound Image    Dressing Appearance Dry;Intact;Clean   Drainage Amount None   Appearance Intact;Yellow;Dry   Care Cleansed with:;Sterile normal saline   Dressing Applied;Collagen;Hydrogel;Foam   Dressing Change Due 01/04/24        Altered Skin Integrity 11/27/23 1833 Left lateral Foot Full thickness tissue loss. Base is covered by slough and/or eschar in the wound bed   Date First Assessed/Time First Assessed: 11/27/23 1833   Altered Skin Integrity Present on Admission - Did Patient arrive to the hospital with altered skin?: yes  Side: Left  Orientation: lateral   Location: Foot  Description of Altered Skin Integrity:...   Dressing Appearance Dry;Intact;Clean   Drainage Amount None   Appearance Intact;Black;Dry   Care Applied:;Povidone iodine   Dressing Applied;Foam        Altered Skin Integrity 11/27/23 1833 Right lateral Foot Full thickness tissue loss. Base is covered by slough and/or eschar in the wound bed   Date First Assessed/Time First Assessed: 11/27/23 1833   Altered Skin Integrity Present on Admission - Did Patient arrive to the hospital with altered skin?: yes  Side: Right  Orientation: lateral  Location: Foot  Description of Altered Skin Integrity...   Dressing Appearance Dry;Intact;Clean   Drainage Amount None   Appearance Intact;Black;Dry   Care Applied:;Povidone iodine   Dressing Applied;Foam   Dressing Change Due 01/03/24 01/02/2024

## 2024-01-02 NOTE — ASSESSMENT & PLAN NOTE
Suspect this patient has Complicated cystitis given retention and positive UA  UCx growing multi drug resistant E coli  ID following, started on ertapenem, anticipate 7 day course, last day 1/6

## 2024-01-02 NOTE — SUBJECTIVE & OBJECTIVE
Interval History: TVP placed yesterday PM, did OK overnight. Remains confused, oriented to place but not time. EP planning for micra once on abx for 72 hours    Review of Systems   Unable to perform ROS: Mental status change     Objective:     Vital Signs (Most Recent):  Temp: 98.4 °F (36.9 °C) (01/02/24 0705)  Pulse: 79 (01/02/24 1005)  Resp: 16 (01/02/24 1005)  BP: (!) 144/77 (01/02/24 1005)  SpO2: 99 % (01/02/24 1005) Vital Signs (24h Range):  Temp:  [97.2 °F (36.2 °C)-99 °F (37.2 °C)] 98.4 °F (36.9 °C)  Pulse:  [43-80] 79  Resp:  [12-52] 16  SpO2:  [93 %-100 %] 99 %  BP: (126-159)/(60-79) 144/77     Weight: 80.7 kg (178 lb)  Body mass index is 24.83 kg/m².     SpO2: 99 %         Intake/Output Summary (Last 24 hours) at 1/2/2024 1153  Last data filed at 1/2/2024 1005  Gross per 24 hour   Intake 819.51 ml   Output 660 ml   Net 159.51 ml         Lines/Drains/Airways       Peripherally Inserted Central Catheter Line  Duration             PICC Double Lumen 12/07/23 1457 right brachial 25 days              Central Venous Catheter Line  Duration             Trialysis (Dialysis) Catheter 12/29/23 2117 right internal jugular 3 days     Introducer Single Lumen 01/01/24 0927 Internal Jugular Left 1 day              Drain  Duration                  Urethral Catheter 12/28/23 1600 Double-lumen 16 Fr. 4 days              Line  Duration                  Pacer Wires 01/01/24 0928 1 day                       Physical Exam  Vitals and nursing note reviewed.   Constitutional:       General: He is not in acute distress.     Appearance: He is ill-appearing.   HENT:      Mouth/Throat:      Mouth: Mucous membranes are dry.   Eyes:      Extraocular Movements: Extraocular movements intact.      Pupils: Pupils are equal, round, and reactive to light.   Cardiovascular:      Rate and Rhythm: Normal rate and regular rhythm.      Pulses: Normal pulses.      Heart sounds: Normal heart sounds. No murmur heard.  Pulmonary:      Effort:  Pulmonary effort is normal.      Breath sounds: Normal breath sounds. No wheezing, rhonchi or rales.   Abdominal:      General: Bowel sounds are normal. There is no distension.      Palpations: Abdomen is soft.      Tenderness: There is no abdominal tenderness.   Musculoskeletal:         General: No tenderness.      Right lower leg: No edema.      Left lower leg: No edema.   Skin:     General: Skin is warm and dry.      Capillary Refill: Capillary refill takes less than 2 seconds.      Findings: No erythema or rash.   Neurological:      Mental Status: He is lethargic, disoriented and confused.            Significant Labs: CMP   Recent Labs   Lab 01/01/24  0420 01/01/24  0630 01/01/24  1133 01/02/24  0125    138 138 141   K 3.4* 3.5 4.0 3.7    99 99 100   CO2 25 24 24 21*   GLU 83 85 86 75   BUN 84* 85* 85* 88*   CREATININE 5.6* 5.5* 5.6* 6.4*   CALCIUM 8.9 9.1 8.8 9.2   PROT 6.2  --   --  6.6   ALBUMIN 1.8*  --   --  1.9*   BILITOT 0.7  --   --  0.5   ALKPHOS 149*  --   --  91   AST 48*  --   --  40   ALT 62*  --   --  55*   ANIONGAP 14 15 15 20*     , CBC   Recent Labs   Lab 01/01/24  0420 01/01/24  0630 01/02/24  0125   WBC 11.78 7.97 9.68   HGB 7.7* 8.2* 8.1*   HCT 23.6* 25.7* 26.8*    274 292     , All pertinent lab results from the last 24 hours have been reviewed., and   Recent Lab Results         01/02/24  0937   01/02/24  0125   01/01/24  2141        Albumin   1.9         ALP   91         ALT   55         Anion Gap   20         AST   40         Baso #   0.08         Basophil %   0.8         BILIRUBIN TOTAL   0.5  Comment: For infants and newborns, interpretation of results should be based  on gestational age, weight and in agreement with clinical  observations.    Premature Infant recommended reference ranges:  Up to 24 hours.............<8.0 mg/dL  Up to 48 hours............<12.0 mg/dL  3-5 days..................<15.0 mg/dL  6-29 days.................<15.0 mg/dL           BUN   88          Calcium   9.2         Chloride   100         CO2   21         Creatinine   6.4         Differential Method   Automated         eGFR   8.6         Eos #   0.3         Eosinophil %   3.2         Glucose   75         Gran # (ANC)   7.2         Gran %   74.5         Group & Rh   B POS         Hematocrit   26.8         Hemoglobin   8.1         Immature Grans (Abs)   0.04  Comment: Mild elevation in immature granulocytes is non specific and   can be seen in a variety of conditions including stress response,   acute inflammation, trauma and pregnancy. Correlation with other   laboratory and clinical findings is essential.           Immature Granulocytes   0.4         INDIRECT SANTA   NEG         Lymph #   0.8         Lymph %   8.4         Magnesium    3.5         MCH   24.5         MCHC   30.2         MCV   81         Mono #   1.2         Mono %   12.7         MPV   9.3         nRBC   0         Phosphorus Level   5.0         Platelet Count   292         POCT Glucose 78     106       Potassium   3.7         PROTEIN TOTAL   6.6         RBC   3.30         RDW   18.0         Sodium   141         Specimen Outdate   01/05/2024 23:59         WBC   9.68               Significant Imaging:  All imaging from the past 24 hours has been reviewed

## 2024-01-02 NOTE — ASSESSMENT & PLAN NOTE
Baseline creatinine 2-2.5 (longstanding DM with bilateral osteomyelitis, HTN)  Multiple comorbitites as well     On admission serum creatinine 7.5  ELIZABETH appears to be multifactorial in setting of obstructive nephropathy, probably pyelonephritis, low effective circulating volume, possible AIN in setting of longstanding ABX (though daptomycin less likely)     Obstruction relieved by jackson placed 12/28/23 with 500 cc of purulent UOP. Supposedly follows with urology in outpatient setting. Is on tamsulosin.   Pyelonephritis with UA showing 3+ leuks with many bacteria. History of proteus in past.   Low effective circulating volume suggested by physical exam, evidence of acute liver injury in setting of HFpEF with severe aortic stenosis. Home medication included metolazone 1 mg bid     Dialysis consent obtained 12/28/23     Plan/Recommendation  Will plan for HD today (3rd treatment) for clearance and minium volume removal. Target UF 0.5-1 L as tolerated, keep MAP >65.  Plans for leadless PM when infection clears per notes, remain on IV abx (Ceftriaxone started 12/28/23)  Continue jackson and monitor UOP  -Strict ins and outs  -Avoid nephrotoxic agents if possible and renally dose medications  -Avoid drastic hemodynamic changes if possible

## 2024-01-02 NOTE — PLAN OF CARE
"[facility] Care Plan    POC reviewed with Chato Bowie and family at 0300. Pt verbalized understanding. Questions and concerns addressed. No acute events overnight. Pt progressing toward goals. Will continue to monitor. See below and flowsheets for full assessment and VS info.     -TVP pacing at 80, mA 10  -jackson in place        Is this a stroke patient? no    Neuro:  [place] Coma Scale  Best Eye Response: 3-->(E3) to speech  Best Motor Response: 6-->(M6) obeys commands  Best Verbal Response: 4-->(V4) confused  [place] Coma Scale Score: 13  Assessment Qualifiers: no eye obstruction present, patient not sedated/intubated  Pupil PERRLA: yes     24hr Temp:  [97.2 °F (36.2 °C)-99.5 °F (37.5 °C)]     CV:   Rhythm: paced rhythm  BP goals:   SBP < 180  MAP > 65    Resp:           Plan: N/A    GI/:     Diet/Nutrition Received: NPO  Last Bowel Movement: 01/01/24  Voiding Characteristics: urethral catheter (bladder)    Intake/Output Summary (Last 24 hours) at 1/2/2024 0340  Last data filed at 1/2/2024 0257  Gross per 24 hour   Intake 346.03 ml   Output 750 ml   Net -403.97 ml     Unmeasured Output  Stool Occurrence: 0    Labs/Accuchecks:  Recent Labs   Lab 01/02/24  0125   WBC 9.68   RBC 3.30*   HGB 8.1*   HCT 26.8*         Recent Labs   Lab 01/02/24  0125      K 3.7   CO2 21*      BUN 88*   CREATININE 6.4*   ALKPHOS 91   ALT 55*   AST 40   BILITOT 0.5    No results for input(s): "PROTIME", "INR", "APTT", "HEPANTIXA" in the last 168 hours.   Recent Labs   Lab 12/28/23  2324 01/01/24  0630   *  --    TROPONINI  --  0.384*       Electrolytes: Contraindicated  Accuchecks: ACHS    Gtts:   sodium chloride 0.9% 10 mL/hr at 01/01/24 1937       LDA/Wounds:  Lines/Drains/Airways       Peripherally Inserted Central Catheter Line  Duration             PICC Double Lumen 12/07/23 1457 right brachial 25 days              Central Venous Catheter Line  Duration             Trialysis (Dialysis) Catheter 12/29/23 2117 " right internal jugular 3 days     Introducer Single Lumen 01/01/24 0927 Internal Jugular Left <1 day              Drain  Duration                  Urethral Catheter 12/28/23 1600 Double-lumen 16 Fr. 4 days              Line  Duration                  Pacer Wires 01/01/24 0928 <1 day                  Wounds: Yes  Wound care consulted: Yes

## 2024-01-03 PROBLEM — I10 ESSENTIAL HYPERTENSION: Status: RESOLVED | Noted: 2017-03-10 | Resolved: 2024-01-03

## 2024-01-03 PROBLEM — Z51.5 PALLIATIVE CARE ENCOUNTER: Status: ACTIVE | Noted: 2024-01-03

## 2024-01-03 LAB
ALBUMIN SERPL BCP-MCNC: 1.8 G/DL (ref 3.5–5.2)
ALBUMIN/CREAT UR: 647.3 UG/MG (ref 0–30)
ALP SERPL-CCNC: 90 U/L (ref 55–135)
ALT SERPL W/O P-5'-P-CCNC: 41 U/L (ref 10–44)
ANION GAP SERPL CALC-SCNC: 14 MMOL/L (ref 8–16)
AST SERPL-CCNC: 31 U/L (ref 10–40)
BASOPHILS # BLD AUTO: 0.07 K/UL (ref 0–0.2)
BASOPHILS NFR BLD: 0.9 % (ref 0–1.9)
BILIRUB SERPL-MCNC: 0.4 MG/DL (ref 0.1–1)
BUN SERPL-MCNC: 88 MG/DL (ref 8–23)
CALCIUM SERPL-MCNC: 8.8 MG/DL (ref 8.7–10.5)
CHLORIDE SERPL-SCNC: 100 MMOL/L (ref 95–110)
CHLORIDE UR-SCNC: 46 MMOL/L (ref 25–200)
CO2 SERPL-SCNC: 23 MMOL/L (ref 23–29)
CREAT SERPL-MCNC: 6 MG/DL (ref 0.5–1.4)
CREAT UR-MCNC: 74 MG/DL (ref 23–375)
CREAT UR-MCNC: 74 MG/DL (ref 23–375)
DIFFERENTIAL METHOD BLD: ABNORMAL
EOSINOPHIL # BLD AUTO: 0.4 K/UL (ref 0–0.5)
EOSINOPHIL NFR BLD: 4.6 % (ref 0–8)
ERYTHROCYTE [DISTWIDTH] IN BLOOD BY AUTOMATED COUNT: 18.1 % (ref 11.5–14.5)
EST. GFR  (NO RACE VARIABLE): 9.3 ML/MIN/1.73 M^2
GLUCOSE SERPL-MCNC: 83 MG/DL (ref 70–110)
HCT VFR BLD AUTO: 27.1 % (ref 40–54)
HGB BLD-MCNC: 8.5 G/DL (ref 14–18)
IMM GRANULOCYTES # BLD AUTO: 0.05 K/UL (ref 0–0.04)
IMM GRANULOCYTES NFR BLD AUTO: 0.6 % (ref 0–0.5)
LYMPHOCYTES # BLD AUTO: 0.8 K/UL (ref 1–4.8)
LYMPHOCYTES NFR BLD: 9.5 % (ref 18–48)
MAGNESIUM SERPL-MCNC: 3.4 MG/DL (ref 1.6–2.6)
MCH RBC QN AUTO: 25.5 PG (ref 27–31)
MCHC RBC AUTO-ENTMCNC: 31.4 G/DL (ref 32–36)
MCV RBC AUTO: 81 FL (ref 82–98)
MICROALBUMIN UR DL<=1MG/L-MCNC: 479 UG/ML
MONOCYTES # BLD AUTO: 1 K/UL (ref 0.3–1)
MONOCYTES NFR BLD: 12.3 % (ref 4–15)
NEUTROPHILS # BLD AUTO: 5.9 K/UL (ref 1.8–7.7)
NEUTROPHILS NFR BLD: 72.1 % (ref 38–73)
NRBC BLD-RTO: 0 /100 WBC
PHOSPHATE SERPL-MCNC: 4.9 MG/DL (ref 2.7–4.5)
PLATELET # BLD AUTO: 294 K/UL (ref 150–450)
PMV BLD AUTO: 9.6 FL (ref 9.2–12.9)
POCT GLUCOSE: 123 MG/DL (ref 70–110)
POCT GLUCOSE: 84 MG/DL (ref 70–110)
POCT GLUCOSE: 87 MG/DL (ref 70–110)
POTASSIUM SERPL-SCNC: 3.4 MMOL/L (ref 3.5–5.1)
PROT SERPL-MCNC: 6.4 G/DL (ref 6–8.4)
PROT UR-MCNC: 152 MG/DL (ref 0–15)
PROT/CREAT UR: 2.05 MG/G{CREAT} (ref 0–0.2)
RBC # BLD AUTO: 3.33 M/UL (ref 4.6–6.2)
SODIUM SERPL-SCNC: 137 MMOL/L (ref 136–145)
SODIUM UR-SCNC: 22 MMOL/L (ref 20–250)
WBC # BLD AUTO: 8.22 K/UL (ref 3.9–12.7)

## 2024-01-03 PROCEDURE — 82436 ASSAY OF URINE CHLORIDE: CPT | Performed by: NURSE PRACTITIONER

## 2024-01-03 PROCEDURE — 25000003 PHARM REV CODE 250: Performed by: INTERNAL MEDICINE

## 2024-01-03 PROCEDURE — 25000003 PHARM REV CODE 250: Performed by: PHYSICIAN ASSISTANT

## 2024-01-03 PROCEDURE — 63600175 PHARM REV CODE 636 W HCPCS: Performed by: STUDENT IN AN ORGANIZED HEALTH CARE EDUCATION/TRAINING PROGRAM

## 2024-01-03 PROCEDURE — 99291 CRITICAL CARE FIRST HOUR: CPT | Mod: ,,, | Performed by: INTERNAL MEDICINE

## 2024-01-03 PROCEDURE — 99497 ADVNCD CARE PLAN 30 MIN: CPT | Mod: 25,,, | Performed by: STUDENT IN AN ORGANIZED HEALTH CARE EDUCATION/TRAINING PROGRAM

## 2024-01-03 PROCEDURE — 85025 COMPLETE CBC W/AUTO DIFF WBC: CPT | Performed by: INTERNAL MEDICINE

## 2024-01-03 PROCEDURE — 63600175 PHARM REV CODE 636 W HCPCS

## 2024-01-03 PROCEDURE — 84100 ASSAY OF PHOSPHORUS: CPT | Performed by: INTERNAL MEDICINE

## 2024-01-03 PROCEDURE — 80100014 HC HEMODIALYSIS 1:1

## 2024-01-03 PROCEDURE — 84300 ASSAY OF URINE SODIUM: CPT | Performed by: NURSE PRACTITIONER

## 2024-01-03 PROCEDURE — 25000003 PHARM REV CODE 250: Performed by: STUDENT IN AN ORGANIZED HEALTH CARE EDUCATION/TRAINING PROGRAM

## 2024-01-03 PROCEDURE — 84156 ASSAY OF PROTEIN URINE: CPT | Performed by: NURSE PRACTITIONER

## 2024-01-03 PROCEDURE — 83735 ASSAY OF MAGNESIUM: CPT | Performed by: INTERNAL MEDICINE

## 2024-01-03 PROCEDURE — 20000000 HC ICU ROOM

## 2024-01-03 PROCEDURE — 94761 N-INVAS EAR/PLS OXIMETRY MLT: CPT

## 2024-01-03 PROCEDURE — 63600175 PHARM REV CODE 636 W HCPCS: Performed by: INTERNAL MEDICINE

## 2024-01-03 PROCEDURE — 99223 1ST HOSP IP/OBS HIGH 75: CPT | Mod: 25,,, | Performed by: STUDENT IN AN ORGANIZED HEALTH CARE EDUCATION/TRAINING PROGRAM

## 2024-01-03 PROCEDURE — 63600175 PHARM REV CODE 636 W HCPCS: Performed by: PHYSICIAN ASSISTANT

## 2024-01-03 PROCEDURE — 25000003 PHARM REV CODE 250

## 2024-01-03 PROCEDURE — 27000207 HC ISOLATION

## 2024-01-03 PROCEDURE — 99233 SBSQ HOSP IP/OBS HIGH 50: CPT | Mod: ,,, | Performed by: NURSE PRACTITIONER

## 2024-01-03 PROCEDURE — 99223 1ST HOSP IP/OBS HIGH 75: CPT | Mod: GC,,, | Performed by: STUDENT IN AN ORGANIZED HEALTH CARE EDUCATION/TRAINING PROGRAM

## 2024-01-03 PROCEDURE — 82043 UR ALBUMIN QUANTITATIVE: CPT | Performed by: NURSE PRACTITIONER

## 2024-01-03 PROCEDURE — 80053 COMPREHEN METABOLIC PANEL: CPT | Performed by: INTERNAL MEDICINE

## 2024-01-03 RX ORDER — OXYCODONE HYDROCHLORIDE 5 MG/1
5 TABLET ORAL EVERY 6 HOURS PRN
Status: CANCELLED | OUTPATIENT
Start: 2024-01-03

## 2024-01-03 RX ORDER — HYDROMORPHONE HYDROCHLORIDE 1 MG/ML
2 INJECTION, SOLUTION INTRAMUSCULAR; INTRAVENOUS; SUBCUTANEOUS EVERY 6 HOURS PRN
Status: DISCONTINUED | OUTPATIENT
Start: 2024-01-03 | End: 2024-01-03

## 2024-01-03 RX ORDER — OXYCODONE HYDROCHLORIDE 5 MG/1
5 TABLET ORAL EVERY 6 HOURS PRN
Status: DISCONTINUED | OUTPATIENT
Start: 2024-01-03 | End: 2024-01-07

## 2024-01-03 RX ORDER — BENZONATATE 100 MG/1
100 CAPSULE ORAL 3 TIMES DAILY PRN
Status: DISCONTINUED | OUTPATIENT
Start: 2024-01-03 | End: 2024-01-19 | Stop reason: HOSPADM

## 2024-01-03 RX ADMIN — ACETAMINOPHEN 1000 MG: 500 TABLET ORAL at 02:01

## 2024-01-03 RX ADMIN — DAPTOMYCIN 650 MG: 350 INJECTION, POWDER, LYOPHILIZED, FOR SOLUTION INTRAVENOUS at 09:01

## 2024-01-03 RX ADMIN — FAMOTIDINE 20 MG: 20 TABLET, FILM COATED ORAL at 09:01

## 2024-01-03 RX ADMIN — ATORVASTATIN CALCIUM 80 MG: 40 TABLET, FILM COATED ORAL at 09:01

## 2024-01-03 RX ADMIN — ACETAMINOPHEN 1000 MG: 500 TABLET ORAL at 09:01

## 2024-01-03 RX ADMIN — ERTAPENEM 500 MG: 1 INJECTION INTRAMUSCULAR; INTRAVENOUS at 11:01

## 2024-01-03 RX ADMIN — HEPARIN SODIUM 5000 UNITS: 5000 INJECTION INTRAVENOUS; SUBCUTANEOUS at 09:01

## 2024-01-03 RX ADMIN — MUPIROCIN: 20 OINTMENT TOPICAL at 09:01

## 2024-01-03 RX ADMIN — BENZONATATE 100 MG: 100 CAPSULE ORAL at 11:01

## 2024-01-03 RX ADMIN — HYDROMORPHONE HYDROCHLORIDE 0.5 MG: 0.5 INJECTION, SOLUTION INTRAMUSCULAR; INTRAVENOUS; SUBCUTANEOUS at 10:01

## 2024-01-03 RX ADMIN — HEPARIN SODIUM 5000 UNITS: 5000 INJECTION INTRAVENOUS; SUBCUTANEOUS at 02:01

## 2024-01-03 RX ADMIN — ALLOPURINOL 50 MG: 300 TABLET ORAL at 09:01

## 2024-01-03 RX ADMIN — FOLIC ACID 1 MG: 1 TABLET ORAL at 09:01

## 2024-01-03 RX ADMIN — LIDOCAINE 5% 1 PATCH: 700 PATCH TOPICAL at 08:01

## 2024-01-03 NOTE — PROGRESS NOTES
[facility]  Cardiac Electrophysiology  Progress Note    Admission Date: 12/28/2023  Code Status: DNR   Attending Physician: Stephen Werner MD   Expected Discharge Date: 1/5/2024  Principal Problem:Complete heart block    Subjective:     Interval History: Had an episode of unresponsiveness on 1/1 and TdP which resolved after 10-20 seconds of chest compressions. After that he was sinus with complete heart block. TVP was placed and he has been paced at a rate of 80. Underlying rhythm still sinus with complete heart block.    Waxing and waning mentation. Early this morning he was lucid and having full discussion with me, mentioning he would like the pacemaker. Yesterday morning he was disoriented. Later this morning he is moaning in pain (getting IV pain meds from primary), is disoriented, and not following commands. Overall trajectory is not improving. Primary team discussing hospice.     sodium chloride 0.9%   Intravenous Once    acetaminophen  1,000 mg Oral Q8H    allopurinoL  50 mg Oral Daily    atorvastatin  80 mg Oral Daily    DAPTOmycin (CUBICIN) IV (PEDS and ADULTS)  8 mg/kg Intravenous Q48H    ertapenem (INVANZ) IVPB  500 mg Intravenous Q24H    famotidine  20 mg Oral Daily    folic acid  1 mg Oral Daily    heparin (porcine)  5,000 Units Subcutaneous Q8H    LIDOcaine  1 patch Transdermal Q24H    mupirocin   Nasal BID    potassium bicarbonate  40 mEq Oral Once       0.9%  NaCl infusion (for blood administration), sodium chloride 0.9%, benzonatate, dextrose 10%, dextrose 10%, glucagon (human recombinant), glucose, glucose, heparin (porcine), HYDROmorphone, HYDROmorphone, insulin aspart U-100, magnesium oxide, methocarbamoL, naloxone, oxyCODONE, senna-docusate 8.6-50 mg, sodium chloride 0.9%, sodium chloride 0.9%      Review of Systems   Unable to perform ROS: Mental status change     Objective:     Vital Signs (Most Recent):  Temp: 98 °F (36.7 °C) (01/03/24 0815)  Pulse: 79 (01/03/24  111)  Resp: 18 (24 1115)  BP: (!) 151/71 (24 1115)  SpO2: 97 % (24 1115) Vital Signs (24h Range):  Temp:  [97.6 °F (36.4 °C)-98 °F (36.7 °C)] 98 °F (36.7 °C)  Pulse:  [79-80] 79  Resp:  [14-33] 18  SpO2:  [92 %-98 %] 97 %  BP: ()/(52-83) 151/71     Weight: 80.7 kg (178 lb)  Body mass index is 24.83 kg/m².     SpO2: 97 %    Telemetry : Complete heart block, HR 49. In the AM patient had an episode of TdP and then sinus tachy with CHB.     Physical Exam  Constitutional:       General: He is not in acute distress.     Appearance: He is ill-appearing.   HENT:      Mouth/Throat:      Mouth: Mucous membranes are dry.   Cardiovascular:      Rate and Rhythm: Normal rate.      Heart sounds: Murmur heard.      Comments: TVP-paced at 80  Pulmonary:      Effort: Pulmonary effort is normal.      Breath sounds: Normal breath sounds.   Musculoskeletal:      Right lower leg: No edema.      Left lower leg: No edema.   Skin:     General: Skin is warm.   Neurological:      Mental Status: He is disoriented.          Significant Labs:     Recent Labs   Lab 24  0630 24  0125 24  031   WBC 7.97 9.68 8.22   HGB 8.2* 8.1* 8.5*   HCT 25.7* 26.8* 27.1*    292 294         Recent Labs   Lab 24  0630 24  1133 24  0125 24  0316    138 141 137   K 3.5 4.0 3.7 3.4*   CL 99 99 100 100   CO2  21* 23   BUN 85* 85* 88* 88*   CREATININE 5.5* 5.6* 6.4* 6.0*   CALCIUM 9.1 8.8 9.2 8.8   PHOS 4.4  --  5.0* 4.9*         Recent Labs   Lab 24  0420 24  0125 24  0316   ALKPHOS 149* 91 90   BILITOT 0.7 0.5 0.4   PROT 6.2 6.6 6.4   ALT 62* 55* 41   AST 48* 40 31             Recent Labs   Lab 23  2324 24  0630   *  --    TROPONINI  --  0.384*       Significant Imagin/29/23    Left Ventricle: The left ventricle is normal in size. Ventricular mass is normal. Normal wall thickness. There is concentric remodeling. Septal motion is  consistent with bundle branch block. There is normal systolic function with a visually estimated ejection fraction of 55 - 60%. Grade II diastolic dysfunction.    Right Ventricle: Normal right ventricular cavity size. Systolic function is normal.    Left Atrium: Left atrium is severely dilated.    Aortic Valve: There is moderate aortic valve sclerosis. Moderately restricted motion. There is severe stenosis. Aortic valve area by VTI is 0.86 cm². Aortic valve peak velocity is 4.12 m/s. Mean gradient is 43 mmHg. The dimensionless index is 0.22. There is mild to moderate aortic regurgitation.    Mitral Valve: There is bileaflet sclerosis. There is mild annular dilation present.    Tricuspid Valve: There is mild to moderate regurgitation.    Pulmonary Artery: The estimated pulmonary artery systolic pressure is 72 mmHg.    IVC/SVC: Intermediate venous pressure at 8 mmHg.  Assessment and Plan:     * Complete heart block  Patient with complete heart block, Previous EKG with first degree AV block and LBBB. Patient on metoprolol and amiodarone as an outpatient  Patient hemodynamically stable, but is unable to give a reliable history  Had an episode of [place] this am, and then sinus tachycardia with complete heart block  Evaluated by ID who recommended ATB therapy anticipated for 7 days   DNR, no ICD placement    Recommendations:  -TVP placement due to acute event  -Hold AV iker agents  -Primary discussing hospice and goals of care with family  -He is being paced effectively by TVP. If they decide to move forward, we can discuss Micra placement with some clinical improvement        Connor M Gillies, MD  Cardiac Electrophysiology  [facility]

## 2024-01-03 NOTE — SUBJECTIVE & OBJECTIVE
Interval History:   HD unable to be performed overnight due to high census, started this morning.  Noted to have had improvement in SCR on AM labs from 6.4 down to 6.0 prior to HD being started.  UOP of 800 ml/24h.  Electrolytes non-emergent.      Review of patient's allergies indicates:  No Known Allergies  Current Facility-Administered Medications   Medication Frequency    0.9%  NaCl infusion (for blood administration) Q24H PRN    0.9%  NaCl infusion Continuous    0.9%  NaCl infusion PRN    0.9%  NaCl infusion Once    acetaminophen tablet 1,000 mg Q8H    allopurinol split tablet 50 mg Daily    atorvastatin tablet 80 mg Daily    benzonatate capsule 100 mg TID PRN    DAPTOmycin (CUBICIN) 650 mg in sodium chloride 0.9% SolP 50 mL IVPB Q48H    dextrose 10% bolus 125 mL 125 mL PRN    dextrose 10% bolus 250 mL 250 mL PRN    ertapenem (INVANZ) 500 mg in sodium chloride 0.9% 100 mL IVPB Q24H    famotidine tablet 20 mg Daily    folic acid tablet 1 mg Daily    glucagon (human recombinant) injection 1 mg PRN    glucose chewable tablet 16 g PRN    glucose chewable tablet 24 g PRN    heparin (porcine) injection 1,000 Units PRN    heparin (porcine) injection 5,000 Units Q8H    HYDROmorphone injection 0.5 mg Q6H PRN    HYDROmorphone injection 2 mg Q6H PRN    insulin aspart U-100 pen 0-5 Units QID (AC + HS) PRN    LIDOcaine 5 % patch 1 patch Q24H    magnesium oxide tablet 800 mg PRN    methocarbamoL tablet 500 mg QID PRN    mupirocin 2 % ointment BID    naloxone 0.4 mg/mL injection 0.02 mg PRN    oxyCODONE immediate release tablet 5 mg Q6H PRN    potassium bicarbonate disintegrating tablet 40 mEq Once    senna-docusate 8.6-50 mg per tablet 2 tablet BID PRN    sodium chloride 0.9% bolus 250 mL 250 mL PRN    sodium chloride 0.9% flush 10 mL Q12H PRN       Objective:     Vital Signs (Most Recent):  Temp: 98.5 °F (36.9 °C) (01/03/24 1115)  Pulse: 79 (01/03/24 1200)  Resp: 15 (01/03/24 1200)  BP: 134/61 (01/03/24 1200)  SpO2: 97 %  (01/03/24 1200) Vital Signs (24h Range):  Temp:  [97.6 °F (36.4 °C)-98.5 °F (36.9 °C)] 98.5 °F (36.9 °C)  Pulse:  [79-80] 79  Resp:  [14-33] 15  SpO2:  [92 %-98 %] 97 %  BP: ()/(52-83) 134/61     Weight: 80.7 kg (178 lb) (12/29/23 0703)  Body mass index is 24.83 kg/m².  Body surface area is 2.01 meters squared.    I/O last 3 completed shifts:  In: 1162.6 [P.O.:540; I.V.:374.6; IV Piggyback:248]  Out: 1160 [Urine:1160]     Physical Exam  Vitals and nursing note reviewed.   Constitutional:       General: He is not in acute distress.     Appearance: He is ill-appearing. He is not toxic-appearing.      Interventions: Nasal cannula in place.   HENT:      Head: Normocephalic and atraumatic.      Nose: Nose normal.      Mouth/Throat:      Mouth: Mucous membranes are dry.      Pharynx: No oropharyngeal exudate.   Eyes:      General: No scleral icterus.        Right eye: No discharge.         Left eye: No discharge.   Cardiovascular:      Rate and Rhythm: Normal rate.   Pulmonary:      Effort: Pulmonary effort is normal. No respiratory distress.      Breath sounds: No wheezing or rales.   Abdominal:      General: Abdomen is flat.   Musculoskeletal:      Cervical back: Normal range of motion. No rigidity.      Right lower leg: No edema.      Left lower leg: Edema present.      Comments: Bilateral heels wrapped in ACE bandages.    Lymphadenopathy:      Cervical: No cervical adenopathy.   Neurological:      Mental Status: He is alert and easily aroused.      Comments: Alert, slow to respond   Psychiatric:         Behavior: Behavior is uncooperative.          Significant Labs:  CBC:   Recent Labs   Lab 01/03/24  0316   WBC 8.22   RBC 3.33*   HGB 8.5*   HCT 27.1*      MCV 81*   MCH 25.5*   MCHC 31.4*     CMP:   Recent Labs   Lab 01/03/24  0316   GLU 83   CALCIUM 8.8   ALBUMIN 1.8*   PROT 6.4      K 3.4*   CO2 23      BUN 88*   CREATININE 6.0*   ALKPHOS 90   ALT 41   AST 31   BILITOT 0.4

## 2024-01-03 NOTE — ASSESSMENT & PLAN NOTE
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 Infections (UTI and OM) could also be contributing  Remains disoriented but alertness improved, answers some questions appropriately. Oriented to place and month.   Currently able to protect airway but will continue to monitor  CT head unremarkable  Continue ertapenem for UTI  Continue daptomycin for OM  SLP advised for minced/moist diet

## 2024-01-03 NOTE — ASSESSMENT & PLAN NOTE
Admitted with ELIZABETH on CKD  Initial  with sCr 7.5; improving with dialysis  Nephrology consulted, started on HD for clearance  Still making some urine  Dialysis per nephro

## 2024-01-03 NOTE — ASSESSMENT & PLAN NOTE
Insight/goals of care- fair, understands limited nature of his life expectancy, but is clear he is not ready to die in the immediate future (e.g. removing pacing). Suspect prognosis of weeks if discharged with pacer, would be likely hours to days without.     Social support- Niece is MPOA, patient in nursing home prior to admit, unclear what level of support at home     Spiritual- did not assess    Symptom management- pain related to chronic LE wounds, pain well-controlled with current meds    Recommendations  -DNR, full support  -will continue to attempt to contact decision-makers to continue goals of care  -however, if consent already obtained, reasonable to proceed with Micra placement even if disposition is hospice given the widely discrepant prognosis with and without   -*code status should not necessarily be reversed for this procedure; patient's MPOA has agreed to cardioversion but not intubation or CPR; this is ethically sound, has been documented in ACP note on 1/1, and should be supported  -continue oxycodone 10mg q4hr PRN for pain, hydromorphone 0.5mg IV q6hr PRN for breakthrough/wound care

## 2024-01-03 NOTE — ASSESSMENT & PLAN NOTE
Baseline creatinine 2-2.5 (longstanding DM with bilateral osteomyelitis, HTN)  Multiple comorbitites as well     On admission serum creatinine 7.5  ELIZABETH appears to be multifactorial, possible prolonged pre-renal state/ATN from hemodynamic changes from CHB vs progressive CKD.   Obstruction relieved by jackson placed 12/28/23 with 500 cc of purulent UOP. Supposedly follows with urology in outpatient setting. Is on tamsulosin.   Pyelonephritis with UA showing 3+ leuks with many bacteria. History of proteus in past.   Low effective circulating volume suggested by physical exam, evidence of acute liver injury in setting of HFpEF with severe aortic stenosis. Home medication included metolazone 1 mg bid     Dialysis consent obtained 12/28/23     Plan/Recommendation  HD today for metabolic clearance only, no net UF.    -check renal US  -repeat UA with UPCR/Urine lytes/Urine micro/alb ration  Continue jackson and monitor UOP  -Strict ins and outs  -Avoid nephrotoxic agents if possible and renally dose medications  -Avoid drastic hemodynamic changes if possible

## 2024-01-03 NOTE — CONSULTS
[facility]  Palliative Medicine  Consult Note    Patient Name: Chato Bowie  MRN: [id number]  Admission Date: 12/28/2023  Hospital Length of Stay: 6 days  Code Status: DNR   Attending Provider: Stephen Werner MD  Consulting Provider: Jose Duval MD  Primary Care Physician: Irlanda Valenzuela -  Principal Problem:Complete heart block    Patient information was obtained from patient, past medical records, and primary team.      Inpatient consult to Palliative Care  Consult performed by: Jose Duval MD  Consult ordered by: Savannah Rincon MD        Assessment/Plan:     Palliative Care  Palliative care encounter  Insight/goals of care- fair, understands limited nature of his life expectancy, but is clear he is not ready to die in the immediate future (e.g. removing pacing). Suspect prognosis of weeks if discharged with pacer, would be likely hours to days without.     Social support- Niece is MPOA, patient in nursing home prior to admit, unclear what level of support at home     Spiritual- did not assess    Symptom management- pain related to chronic LE wounds, pain well-controlled with current meds    Recommendations  -DNR, full support  -will continue to attempt to contact decision-makers to continue goals of care  -however, if consent already obtained, reasonable to proceed with Micra placement even if disposition is hospice given the widely discrepant prognosis with and without   -*code status should not necessarily be reversed for this procedure; patient's MPOA has agreed to cardioversion but not intubation or CPR; this is ethically sound, has been documented in ACP note on 1/1, and should be supported  -continue oxycodone 10mg q4hr PRN for pain, hydromorphone 0.5mg IV q6hr PRN for breakthrough/wound care        Thank you for your consult. I will follow-up with patient. Please contact us if you have any additional questions.    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 Underwent HD x3, with improvement in mental status. EP consulted to consider pacemaker placement. Palliative care consulted to assist with goals of care.     Hospital Course:  No notes on file    Interval History: Chart reviewed including 24h medication use. Patient somnolent but arousable and interactive. No family at bedside during visit. EP deferred Micra placement, noting "primary discussing hospice". Pt denies active pain but notes some increased pain with wound care. Expresses some frustration with his procedure being deferred.       Past Medical History:   Diagnosis Date    Acute congestive heart failure 3/20/2020    Alcohol abuse     Arthritis     Asthma attack 11/24/2021    Coronary artery disease     Diabetes mellitus     Hyperlipemia     Hypertension     Multiple thyroid nodules 6/14/2023    Rhabdomyolysis        Past Surgical History:   Procedure Laterality Date    ECHOCARDIOGRAM,TRANSESOPHAGEAL N/A 9/21/2023    Procedure: Transesophageal echo (MARIA) intra-procedure log documentation;  Surgeon: Luisana Rodríguez MD;  Location: [facility] CATH LAB;  Service: Cardiology;  Laterality: N/A;    EYE SURGERY      IRRIGATION AND DEBRIDEMENT Bilateral 12/1/2023    Procedure: IRRIGATION AND DEBRIDEMENT;  Surgeon: Jenna Gutiérrez DPM;  Location: [facility] OR;  Service: Podiatry;  Laterality: Bilateral;  Request antibiotic beads    TRANSESOPHAGEAL ECHOCARDIOGRAM WITH POSSIBLE CARDIOVERSION (MARIA W/ POSS CARDIOVERSION) N/A 1/5/2023    Procedure: Transesophageal echo (MARIA) intra-procedure log documentation;  Surgeon: Indra Foote MD;  Location: [facility] " CATH LAB;  Service: Cardiology;  Laterality: N/A;    TRANSESOPHAGEAL ECHOCARDIOGRAPHY N/A 9/21/2023    Procedure: ECHOCARDIOGRAM, TRANSESOPHAGEAL;  Surgeon: Luisana Rodríguez MD;  Location: [facility] CATH LAB;  Service: Cardiology;  Laterality: N/A;    TREATMENT OF CARDIAC ARRHYTHMIA N/A 12/7/2020    Procedure: Cardioversion or Defibrillation;  Surgeon: Indra Foote MD;  Location: [facility] CATH LAB;  Service: Cardiology;  Laterality: N/A;    TREATMENT OF CARDIAC ARRHYTHMIA N/A 12/30/2020    Procedure: Cardioversion or Defibrillation;  Surgeon: Tyrone Steen MD;  Location: [facility] CATH LAB;  Service: Cardiology;  Laterality: N/A;    TREATMENT OF CARDIAC ARRHYTHMIA N/A 1/5/2023    Procedure: Cardioversion or Defibrillation;  Surgeon: Indra Foote MD;  Location: [facility] CATH LAB;  Service: Cardiology;  Laterality: N/A;    VASCULAR SURGERY         Review of patient's allergies indicates:  No Known Allergies    Medications:  Continuous Infusions:   sodium chloride 0.9% 10 mL/hr at 01/03/24 1501     Scheduled Meds:   sodium chloride 0.9%   Intravenous Once    acetaminophen  1,000 mg Oral Q8H    allopurinoL  50 mg Oral Daily    atorvastatin  80 mg Oral Daily    DAPTOmycin (CUBICIN) IV (PEDS and ADULTS)  8 mg/kg Intravenous Q48H    ertapenem (INVANZ) IVPB  500 mg Intravenous Q24H    famotidine  20 mg Oral Daily    folic acid  1 mg Oral Daily    heparin (porcine)  5,000 Units Subcutaneous Q8H    LIDOcaine  1 patch Transdermal Q24H    mupirocin   Nasal BID    potassium bicarbonate  40 mEq Oral Once     PRN Meds:0.9%  NaCl infusion (for blood administration), sodium chloride 0.9%, benzonatate, dextrose 10%, dextrose 10%, glucagon (human recombinant), glucose, glucose, heparin (porcine), HYDROmorphone, insulin aspart U-100, magnesium oxide, methocarbamoL, naloxone, oxyCODONE, senna-docusate 8.6-50 mg, sodium chloride 0.9%, sodium chloride 0.9%    Family History       Problem Relation (Age of Onset)    Diabetes Mother, Father,  "Sister    Hypertension Mother, Father, Sister          Tobacco Use    Smoking status: Never    Smokeless tobacco: Never   Substance and Sexual Activity    Alcohol use: Not Currently     Alcohol/week: 6.0 standard drinks of alcohol     Types: 6 Cans of beer per week    Drug use: No    Sexual activity: Yes     Partners: Female         Objective:     Vital Signs (Most Recent):  Temp: 98.1 °F (36.7 °C) (01/03/24 1501)  Pulse: 79 (01/03/24 1501)  Resp: 17 (01/03/24 1501)  BP: 126/66 (01/03/24 1501)  SpO2: 99 % (01/03/24 1501) Vital Signs (24h Range):  Temp:  [97.6 °F (36.4 °C)-98.5 °F (36.9 °C)] 98.1 °F (36.7 °C)  Pulse:  [79-80] 79  Resp:  [14-27] 17  SpO2:  [92 %-99 %] 99 %  BP: ()/(52-83) 126/66     Weight: 80.7 kg (178 lb)  Body mass index is 24.83 kg/m².       Physical Exam  Vitals and nursing note reviewed.   Constitutional:       General: He is not in acute distress.     Appearance: He is ill-appearing. He is not toxic-appearing.   HENT:      Mouth/Throat:      Mouth: Mucous membranes are moist.   Eyes:      General: No scleral icterus.     Extraocular Movements: Extraocular movements intact.   Neck:      Comments: TVP left IJ, trialysis right IJ  Pulmonary:      Effort: Pulmonary effort is normal. No respiratory distress.   Abdominal:      General: Abdomen is flat.      Palpations: Abdomen is soft.   Skin:     General: Skin is warm and dry.   Neurological:      Mental Status: He is alert.      Comments: Incoherent at times but answers some complex questions, oriented to location and situation                Advance Care Planning  Advance Directives:   Medical Power of : Yes    Goals of Care: Engaged patient in brief discussion regarding his wishes in the context of recurrent hospitalizations and functional and clinical decline. Patient shares that he feels like he could be dying soon. I explored that further and he noted that he "has been sick a lot". He also expressed frustration that his procedure " "was not done. When asked what thoughts or conversations he has had about the end of his life, whether that is in weeks, months, or longer, he shared that he would want to be "out of this place [the hospital], with a beer and a steak, and then I'd be on my way". I offered to share an estimated prognosis which he politely declined, but seems to demonstrate insight into the limited nature of his life, although likely unable to make complex medical decision. Attempted several times to call Thea [place], and sister without success       CBC:   Recent Labs   Lab 01/03/24 0316   WBC 8.22   HGB 8.5*   HCT 27.1*   MCV 81*        BMP:  Recent Labs   Lab 01/03/24 0316   GLU 83      K 3.4*      CO2 23   BUN 88*   CREATININE 6.0*   CALCIUM 8.8   MG 3.4*     LFT:  Lab Results   Component Value Date    AST 31 01/03/2024    ALKPHOS 90 01/03/2024    BILITOT 0.4 01/03/2024     Albumin:   Albumin   Date Value Ref Range Status   01/03/2024 1.8 (L) 3.5 - 5.2 g/dL Final     Protein:   Total Protein   Date Value Ref Range Status   01/03/2024 6.4 6.0 - 8.4 g/dL Final     Lactic acid:   Lab Results   Component Value Date    LACTATE 0.8 12/30/2023    LACTATE 0.8 11/27/2023       Reviewed CBC with stable blood counts, CMP with stable renal function/improving, UOP improving      In my care of this patient with acute on chronic severe illness with threat to life and/or bodily function, I am recommending goal-concordant care as noted above. I spent a significant amount of time reviewing external records/ recommendations of other providers, reviewing recent test results, and discussed care with other subspecialists involved    The above recommendations communicated directly to primary team on 1/3    In addition to above, I spent 18 minutes specifically discussing advance care planning and goals of care with patient at bedside.         Jose Duval MD  Palliative Medicine  [facility] - Cardiac Intensive Care              "

## 2024-01-03 NOTE — PLAN OF CARE
"CICU Care Plan    POC reviewed with Chato Bowie at 1901. Pt verbalized understanding. Questions and concerns addressed. No acute events noted this shift. Pt progressing toward goals. Security measures in place, plan of care to continue. See below and flowsheets for full assessment and VS info.     Neuro:  [place] Coma Scale  Best Eye Response: 4-->(E4) spontaneous  Best Motor Response: 6-->(M6) obeys commands  Best Verbal Response: 4-->(V4) confused  [place] Coma Scale Score: 14  Assessment Qualifiers: patient not sedated/intubated, no eye obstruction present  Pupil PERRLA: yes     24 hr Temp:  [97.6 °F (36.4 °C)-97.7 °F (36.5 °C)]     CV:   Rhythm: paced rhythm  BP goals:   SBP < 160  MAP > 65    Resp:   Plan: N/A    GI/:     Diet/Nutrition Received: NPO  Last Bowel Movement: 01/02/24  Voiding Characteristics: urethral catheter (bladder)    Intake/Output Summary (Last 24 hours) at 1/3/2024 0721  Last data filed at 1/3/2024 0601  Gross per 24 hour   Intake 657.81 ml   Output 750 ml   Net -92.19 ml     Unmeasured Output  Stool Occurrence: 0    Labs/Accuchecks:  Recent Labs   Lab 01/03/24  0316   WBC 8.22   RBC 3.33*   HGB 8.5*   HCT 27.1*         Recent Labs   Lab 01/03/24  0316      K 3.4*   CO2 23      BUN 88*   CREATININE 6.0*   ALKPHOS 90   ALT 41   AST 31   BILITOT 0.4    No results for input(s): "PROTIME", "INR", "APTT", "HEPANTIXA" in the last 168 hours.   Recent Labs   Lab 12/28/23  2324 01/01/24  0630   *  --    TROPONINI  --  0.384*       Electrolytes: Contraindicated  Accuchecks: ACHS    Gtts:   sodium chloride 0.9% 10 mL/hr at 01/03/24 0501       LDA/Wounds:  Lines/Drains/Airways       Peripherally Inserted Central Catheter Line  Duration             PICC Double Lumen 12/07/23 1457 right brachial 26 days              Central Venous Catheter Line  Duration             Trialysis (Dialysis) Catheter 12/29/23 2117 right internal jugular 4 days     Introducer Single Lumen " 01/01/24 0927 Internal Jugular Left 1 day              Drain  Duration                  Urethral Catheter 12/28/23 1600 Double-lumen 16 Fr. 5 days              Line  Duration                  Pacer Wires 01/01/24 0928 1 day                  Wounds: Yes  Wound care consulted: Yes    Problem: Infection  Goal: Absence of Infection Signs and Symptoms  Outcome: Ongoing, Progressing     Problem: Adult Inpatient Plan of Care  Goal: Plan of Care Review  Outcome: Ongoing, Progressing  Goal: Patient-Specific Goal (Individualized)  Outcome: Ongoing, Progressing  Goal: Absence of Hospital-Acquired Illness or Injury  Outcome: Ongoing, Progressing  Goal: Optimal Comfort and Wellbeing  Outcome: Ongoing, Progressing  Goal: Readiness for Transition of Care  Outcome: Ongoing, Progressing     Problem: Device-Related Complication Risk (Hemodialysis)  Goal: Safe, Effective Therapy Delivery  Outcome: Ongoing, Progressing     Problem: Hemodynamic Instability (Hemodialysis)  Goal: Effective Tissue Perfusion  Outcome: Ongoing, Progressing     Problem: Infection (Hemodialysis)  Goal: Absence of Infection Signs and Symptoms  Outcome: Ongoing, Progressing     Problem: Diabetes Comorbidity  Goal: Blood Glucose Level Within Targeted Range  Outcome: Ongoing, Progressing     Problem: Fluid and Electrolyte Imbalance (Acute Kidney Injury/Impairment)  Goal: Fluid and Electrolyte Balance  Outcome: Ongoing, Progressing     Problem: Oral Intake Inadequate (Acute Kidney Injury/Impairment)  Goal: Optimal Nutrition Intake  Outcome: Ongoing, Progressing     Problem: Renal Function Impairment (Acute Kidney Injury/Impairment)  Goal: Effective Renal Function  Outcome: Ongoing, Progressing     Problem: Impaired Wound Healing  Goal: Optimal Wound Healing  Outcome: Ongoing, Progressing     Problem: Skin Injury Risk Increased  Goal: Skin Health and Integrity  Outcome: Ongoing, Progressing

## 2024-01-03 NOTE — PLAN OF CARE
"POC reviewed with Chato Bowie. Questions and concerns addressed.Pt continued on TVP at rate 80/min. No acute events throughout the shift.  See below and flowsheets for full assessment and VS info.       Neuro:  [place] Coma Scale  Best Eye Response: 3-->(E3) to speech  Best Motor Response: 6-->(M6) obeys commands  Best Verbal Response: 4-->(V4) confused  [place] Coma Scale Score: 13  Assessment Qualifiers: no eye obstruction present, patient not sedated/intubated  Pupil PERRLA: yes    24 hr Temp:  [97.2 °F (36.2 °C)-98.4 °F (36.9 °C)]     CV:  Rhythm: paced rhythm   DVT prophylaxis: VTE Required Core Measure: Pharmacological prophylaxis initiated/maintained                            Pulses  Right Radial Pulse: 2+ (normal)  Left Radial Pulse: 2+ (normal)  Right Dorsalis Pedis Pulse: 1+ (weak)  Left Dorsalis Pedis Pulse: 1+ (weak)  Right Posterior Tibial Pulse: 1+ (weak)  Left Posterior Tibial Pulse: 1+ (weak)    Resp:  Flow (L/min): 1       GI/:  GI prophylaxis: no  Diet/Nutrition Received: NPO  Last Bowel Movement: 01/02/24  Voiding Characteristics: urethral catheter (bladder)       Urethral Catheter 12/28/23 1600 Double-lumen 16 Fr.-Reason for Continuing Urinary Catheterization: Critically ill in ICU and requiring hourly monitoring of intake/output   Intake/Output Summary (Last 24 hours) at 1/2/2024 1834  Last data filed at 1/2/2024 1801  Gross per 24 hour   Intake 792.75 ml   Output 730 ml   Net 62.75 ml          Labs/Accuchecks:  Recent Labs   Lab 01/01/24  0420 01/01/24  0630 01/02/24  0125   WBC 11.78 7.97 9.68   RBC 3.00* 3.27* 3.30*   HGB 7.7* 8.2* 8.1*   HCT 23.6* 25.7* 26.8*    274 292    No results for input(s): "PT", "INR", "APTT" in the last 168 hours.   Recent Labs     01/02/24  0125      K 3.7   CO2 21*      BUN 88*   CREATININE 6.4*   ALKPHOS 91   ALT 55*   AST 40   BILITOT 0.5       Recent Labs   Lab 12/28/23  2324 01/01/24  0630   *  --    TROPONINI  --  0.384*    No " "results for input(s): "PH", "PCO2", "PO2", "HCO3", "POCSATURATED", "BE" in the last 72 hours.    Electrolytes: Electrolytes replaced  Accuchecks: ACHS    Gtts/LDAs:   sodium chloride 0.9% 10 mL/hr at 01/02/24 1801       Lines/Drains/Airways       Peripherally Inserted Central Catheter Line  Duration             PICC Double Lumen 12/07/23 1457 right brachial 26 days              Central Venous Catheter Line  Duration             Trialysis (Dialysis) Catheter 12/29/23 2117 right internal jugular 3 days     Introducer Single Lumen 01/01/24 0927 Internal Jugular Left 1 day              Drain  Duration                  Urethral Catheter 12/28/23 1600 Double-lumen 16 Fr. 5 days              Line  Duration                  Pacer Wires 01/01/24 0928 1 day                    Skin/Wounds  Bathing/Skin Care: bath, complete;linen changed;dressed/undressed (01/02/24 1505)  Wounds: Yes  Wound care consulted: Yes  "

## 2024-01-03 NOTE — SUBJECTIVE & OBJECTIVE
"Interval History: Chart reviewed including 24h medication use. Patient somnolent but arousable and interactive. No family at bedside during visit. EP deferred Micra placement, noting "primary discussing hospice". Pt denies active pain but notes some increased pain with wound care. Expresses some frustration with his procedure being deferred.       Past Medical History:   Diagnosis Date    Acute congestive heart failure 3/20/2020    Alcohol abuse     Arthritis     Asthma attack 11/24/2021    Coronary artery disease     Diabetes mellitus     Hyperlipemia     Hypertension     Multiple thyroid nodules 6/14/2023    Rhabdomyolysis        Past Surgical History:   Procedure Laterality Date    ECHOCARDIOGRAM,TRANSESOPHAGEAL N/A 9/21/2023    Procedure: Transesophageal echo (MARIA) intra-procedure log documentation;  Surgeon: Luisana Rodríguez MD;  Location: [facility] CATH LAB;  Service: Cardiology;  Laterality: N/A;    EYE SURGERY      IRRIGATION AND DEBRIDEMENT Bilateral 12/1/2023    Procedure: IRRIGATION AND DEBRIDEMENT;  Surgeon: Jenna Gutiérrez DPM;  Location: [facility] OR;  Service: Podiatry;  Laterality: Bilateral;  Request antibiotic beads    TRANSESOPHAGEAL ECHOCARDIOGRAM WITH POSSIBLE CARDIOVERSION (MARIA W/ POSS CARDIOVERSION) N/A 1/5/2023    Procedure: Transesophageal echo (MARIA) intra-procedure log documentation;  Surgeon: Indra Foote MD;  Location: [facility] CATH LAB;  Service: Cardiology;  Laterality: N/A;    TRANSESOPHAGEAL ECHOCARDIOGRAPHY N/A 9/21/2023    Procedure: ECHOCARDIOGRAM, TRANSESOPHAGEAL;  Surgeon: Luisana Rodríguez MD;  Location: [facility] CATH LAB;  Service: Cardiology;  Laterality: N/A;    TREATMENT OF CARDIAC ARRHYTHMIA N/A 12/7/2020    Procedure: Cardioversion or Defibrillation;  Surgeon: Indra Foote MD;  Location: [facility] CATH LAB;  Service: Cardiology;  Laterality: N/A;    TREATMENT OF CARDIAC ARRHYTHMIA N/A 12/30/2020    Procedure: Cardioversion or Defibrillation;  Surgeon: Tyrone Steen MD;  " Location: [facility] CATH LAB;  Service: Cardiology;  Laterality: N/A;    TREATMENT OF CARDIAC ARRHYTHMIA N/A 1/5/2023    Procedure: Cardioversion or Defibrillation;  Surgeon: Indra Foote MD;  Location: [facility] CATH LAB;  Service: Cardiology;  Laterality: N/A;    VASCULAR SURGERY         Review of patient's allergies indicates:  No Known Allergies    Medications:  Continuous Infusions:   sodium chloride 0.9% 10 mL/hr at 01/03/24 1501     Scheduled Meds:   sodium chloride 0.9%   Intravenous Once    acetaminophen  1,000 mg Oral Q8H    allopurinoL  50 mg Oral Daily    atorvastatin  80 mg Oral Daily    DAPTOmycin (CUBICIN) IV (PEDS and ADULTS)  8 mg/kg Intravenous Q48H    ertapenem (INVANZ) IVPB  500 mg Intravenous Q24H    famotidine  20 mg Oral Daily    folic acid  1 mg Oral Daily    heparin (porcine)  5,000 Units Subcutaneous Q8H    LIDOcaine  1 patch Transdermal Q24H    mupirocin   Nasal BID    potassium bicarbonate  40 mEq Oral Once     PRN Meds:0.9%  NaCl infusion (for blood administration), sodium chloride 0.9%, benzonatate, dextrose 10%, dextrose 10%, glucagon (human recombinant), glucose, glucose, heparin (porcine), HYDROmorphone, insulin aspart U-100, magnesium oxide, methocarbamoL, naloxone, oxyCODONE, senna-docusate 8.6-50 mg, sodium chloride 0.9%, sodium chloride 0.9%    Family History       Problem Relation (Age of Onset)    Diabetes Mother, Father, Sister    Hypertension Mother, Father, Sister          Tobacco Use    Smoking status: Never    Smokeless tobacco: Never   Substance and Sexual Activity    Alcohol use: Not Currently     Alcohol/week: 6.0 standard drinks of alcohol     Types: 6 Cans of beer per week    Drug use: No    Sexual activity: Yes     Partners: Female         Objective:     Vital Signs (Most Recent):  Temp: 98.1 °F (36.7 °C) (01/03/24 1501)  Pulse: 79 (01/03/24 1501)  Resp: 17 (01/03/24 1501)  BP: 126/66 (01/03/24 1501)  SpO2: 99 % (01/03/24 1501) Vital Signs (24h Range):  Temp:  [97.6 °F  "(36.4 °C)-98.5 °F (36.9 °C)] 98.1 °F (36.7 °C)  Pulse:  [79-80] 79  Resp:  [14-27] 17  SpO2:  [92 %-99 %] 99 %  BP: ()/(52-83) 126/66     Weight: 80.7 kg (178 lb)  Body mass index is 24.83 kg/m².       Physical Exam  Vitals and nursing note reviewed.   Constitutional:       General: He is not in acute distress.     Appearance: He is ill-appearing. He is not toxic-appearing.   HENT:      Mouth/Throat:      Mouth: Mucous membranes are moist.   Eyes:      General: No scleral icterus.     Extraocular Movements: Extraocular movements intact.   Neck:      Comments: TVP left IJ, trialysis right IJ  Pulmonary:      Effort: Pulmonary effort is normal. No respiratory distress.   Abdominal:      General: Abdomen is flat.      Palpations: Abdomen is soft.   Skin:     General: Skin is warm and dry.   Neurological:      Mental Status: He is alert.      Comments: Incoherent at times but answers some complex questions, oriented to location and situation                Advance Care Planning   Advance Directives:   Medical Power of : Yes    Goals of Care: Engaged patient in brief discussion regarding his wishes in the context of recurrent hospitalizations and functional and clinical decline. Patient shares that he feels like he could be dying soon. I explored that further and he noted that he "has been sick a lot". He also expressed frustration that his procedure was not done. When asked what thoughts or conversations he has had about the end of his life, whether that is in weeks, months, or longer, he shared that he would want to be "out of this place [the hospital], with a beer and a steak, and then I'd be on my way". I offered to share an estimated prognosis which he politely declined, but seems to demonstrate insight into the limited nature of his life, although likely unable to make complex medical decision. Attempted several times to call MIROSLAVA, Thea, and sister without success       CBC:   Recent Labs   Lab " 01/03/24 0316   WBC 8.22   HGB 8.5*   HCT 27.1*   MCV 81*        BMP:  Recent Labs   Lab 01/03/24 0316   GLU 83      K 3.4*      CO2 23   BUN 88*   CREATININE 6.0*   CALCIUM 8.8   MG 3.4*     LFT:  Lab Results   Component Value Date    AST 31 01/03/2024    ALKPHOS 90 01/03/2024    BILITOT 0.4 01/03/2024     Albumin:   Albumin   Date Value Ref Range Status   01/03/2024 1.8 (L) 3.5 - 5.2 g/dL Final     Protein:   Total Protein   Date Value Ref Range Status   01/03/2024 6.4 6.0 - 8.4 g/dL Final     Lactic acid:   Lab Results   Component Value Date    LACTATE 0.8 12/30/2023    LACTATE 0.8 11/27/2023       Reviewed CBC with stable blood counts, CMP with stable renal function/improving, UOP improving

## 2024-01-03 NOTE — PROGRESS NOTES
Arrived patient's room for bedside HD TX.  Received report from primary nurse.  Vital signs stable.  HD TX started via RIJ HD catheter.

## 2024-01-03 NOTE — HPI
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 Underwent HD x3, with improvement in mental status. EP consulted to consider pacemaker placement. Palliative care consulted to assist with goals of care.

## 2024-01-03 NOTE — SUBJECTIVE & OBJECTIVE
Interval History:     Review of Systems   Unable to perform ROS: Mental status change     Objective:     Vital Signs (Most Recent):  Temp: 98 °F (36.7 °C) (01/03/24 0815)  Pulse: 79 (01/03/24 1115)  Resp: 18 (01/03/24 1115)  BP: (!) 151/71 (01/03/24 1115)  SpO2: 97 % (01/03/24 1115) Vital Signs (24h Range):  Temp:  [97.6 °F (36.4 °C)-98 °F (36.7 °C)] 98 °F (36.7 °C)  Pulse:  [79-80] 79  Resp:  [14-33] 18  SpO2:  [92 %-98 %] 97 %  BP: ()/(52-83) 151/71     Weight: 80.7 kg (178 lb)  Body mass index is 24.83 kg/m².     SpO2: 97 %         Intake/Output Summary (Last 24 hours) at 1/3/2024 1157  Last data filed at 1/3/2024 1115  Gross per 24 hour   Intake 577.36 ml   Output 821 ml   Net -243.64 ml         Lines/Drains/Airways       Peripherally Inserted Central Catheter Line  Duration             PICC Double Lumen 12/07/23 1457 right brachial 26 days              Central Venous Catheter Line  Duration             Trialysis (Dialysis) Catheter 12/29/23 2117 right internal jugular 4 days     Introducer Single Lumen 01/01/24 0927 Internal Jugular Left 2 days              Drain  Duration                  Urethral Catheter 12/28/23 1600 Double-lumen 16 Fr. 5 days              Line  Duration                  Pacer Wires 01/01/24 0928 2 days                       Physical Exam  Vitals and nursing note reviewed.   Constitutional:       General: He is not in acute distress.     Appearance: He is ill-appearing.   HENT:      Mouth/Throat:      Mouth: Mucous membranes are dry.   Eyes:      Extraocular Movements: Extraocular movements intact.      Pupils: Pupils are equal, round, and reactive to light.   Cardiovascular:      Rate and Rhythm: Normal rate and regular rhythm.      Pulses: Normal pulses.      Heart sounds: Murmur (ejection systolic murmur) heard.   Pulmonary:      Effort: Pulmonary effort is normal.      Breath sounds: Normal breath sounds. No wheezing, rhonchi or rales.   Abdominal:      General: Bowel sounds are  normal. There is no distension.      Palpations: Abdomen is soft.      Tenderness: There is no abdominal tenderness.   Musculoskeletal:         General: No tenderness.      Right lower leg: No edema.      Left lower leg: No edema.   Skin:     General: Skin is warm and dry.      Capillary Refill: Capillary refill takes less than 2 seconds.      Findings: No erythema or rash.   Neurological:      Mental Status: He is lethargic, disoriented and confused.            Significant Labs: CMP   Recent Labs   Lab 01/02/24  0125 01/03/24  0316    137   K 3.7 3.4*    100   CO2 21* 23   GLU 75 83   BUN 88* 88*   CREATININE 6.4* 6.0*   CALCIUM 9.2 8.8   PROT 6.6 6.4   ALBUMIN 1.9* 1.8*   BILITOT 0.5 0.4   ALKPHOS 91 90   AST 40 31   ALT 55* 41   ANIONGAP 20* 14     , CBC   Recent Labs   Lab 01/02/24  0125 01/03/24  0316   WBC 9.68 8.22   HGB 8.1* 8.5*   HCT 26.8* 27.1*    294     , All pertinent lab results from the last 24 hours have been reviewed., and   Recent Lab Results  (Last 5 results in the past 24 hours)        01/03/24  0941   01/03/24  0316   01/02/24  2144   01/02/24  1714   01/02/24  1418        Albumin   1.8             ALP   90             ALT   41             Anion Gap   14             AST   31             Baso #   0.07             Basophil %   0.9             BILIRUBIN TOTAL   0.4  Comment: For infants and newborns, interpretation of results should be based  on gestational age, weight and in agreement with clinical  observations.    Premature Infant recommended reference ranges:  Up to 24 hours.............<8.0 mg/dL  Up to 48 hours............<12.0 mg/dL  3-5 days..................<15.0 mg/dL  6-29 days.................<15.0 mg/dL               BUN   88             Calcium   8.8             Chloride   100             CO2   23             Creatinine   6.0             Differential Method   Automated             eGFR   9.3             Eos #   0.4             Eosinophil %   4.6             Glucose    83             Gran # (ANC)   5.9             Gran %   72.1             Hematocrit   27.1             Hemoglobin   8.5             Immature Grans (Abs)   0.05  Comment: Mild elevation in immature granulocytes is non specific and   can be seen in a variety of conditions including stress response,   acute inflammation, trauma and pregnancy. Correlation with other   laboratory and clinical findings is essential.               Immature Granulocytes   0.6             Lymph #   0.8             Lymph %   9.5             Magnesium    3.4             MCH   25.5             MCHC   31.4             MCV   81             Mono #   1.0             Mono %   12.3             MPV   9.6             nRBC   0             Phosphorus Level   4.9             Platelet Count   294             POCT Glucose 87     97   90   83       Potassium   3.4             PROTEIN TOTAL   6.4             RBC   3.33             RDW   18.1             Sodium   137             WBC   8.22                                  Significant Imaging:  All imaging from the past 24 hours has been reviewed

## 2024-01-03 NOTE — PLAN OF CARE
[facility]  Initial Discharge Assessment       Primary Care Provider: Irlanda Valenzuela -    Admission Diagnosis: Complete heart block [I44.2]  Bradycardia [R00.1]  CHF (congestive heart failure) [I50.9]  Uremia [N19]  Weakness [R53.1]  Chest pain [R07.9]  Acute urinary retention [R33.8]  Urinary tract infection without hematuria, site unspecified [N39.0]  Acute renal failure superimposed on chronic kidney disease, unspecified acute renal failure type, unspecified CKD stage [N17.9, N18.9]  Ventricular tachycardia [I47.20]    Admission Date: 12/28/2023  Expected Discharge Date: 1/5/2024    Transition of Care Barriers: None    Payor: [employer] MEDICARE / Plan: Car Loan 4U HMO PPO SPECIAL NEEDS / Product Type: Medicare Advantage /     Extended Emergency Contact Information  Primary Emergency Contact: Leigh Evans  Work Phone: [phone]  Relation: Healthcare Power of   Secondary Emergency Contact: Val Bowie  [employer] Phone: [phone]  Relation: Relative    Discharge Plan A: Return to nursing home  Discharge Plan B: Return to Nursing Home      [facility] - [place] - [id number] [facility] [address]  [place] [address]  Phone: [phone] Fax: [phone]      Initial Assessment (most recent)       Adult Discharge Assessment - 01/03/24 1224          Discharge Assessment    Assessment Type Discharge Planning Assessment     Confirmed/corrected address, phone number and insurance No   patient resident at present time at [facility].    Source of Information patient     Communicated ELY with patient/caregiver Date not available/Unable to determine     Reason For Admission Complete heart block     People in [facility] facility resident     Facility Arrived From: [facility] [phone]     Do you expect to return to your current living situation? Yes     Do you have help at home or someone to help you manage your care at home? Yes     Who are  your caregiver(s) and their phone number(s)? facility staff     Current cognitive status: Not Oriented to Time;Alert/Oriented     Home Accessibility wheelchair accessible     Equipment Currently Used at Home wheelchair     Readmission within 30 days? Yes     Patient currently being followed by outpatient case management? No     Do you currently have service(s) that help you manage your care at home? Yes     Name and Contact number of agency facility staff     Is the pt/caregiver preference to resume services with current agency Yes     Do you take prescription medications? Yes     Do you have prescription coverage? Yes     Coverage [employer] managed Care and Medicaid     Do you have any problems affording any of your prescribed medications? No     Is the patient taking medications as prescribed? yes     Who is going to help you get home at discharge? our facility [facility]     How do you get to doctors appointments? other (see comments)   facility transportation    Are you on dialysis? No     Do you take coumadin? No     Discharge Plan A Return to nursing home     Discharge Plan B Return to Nursing Home     DME Needed Upon Discharge  none     Discharge Plan discussed with: Patient     Transition of Care Barriers None        Physical Activity    On average, how many days per week do you engage in moderate to strenuous exercise (like a brisk walk)? 0 days     On average, how many minutes do you engage in exercise at this level? 0 min        Financial Resource Strain    How hard is it for you to pay for the very basics like food, housing, medical care, and heating? Not hard at all        Housing Stability    In the last 12 months, was there a time when you were not able to pay the mortgage or rent on time? No     In the last 12 months, how many places have you lived? 2     In the last 12 months, was there a time when you did not have a steady place to sleep or slept in a shelter (including now)? No        Transportation Needs     In the past 12 months, has lack of transportation kept you from medical appointments or from getting medications? No     In the past 12 months, has lack of transportation kept you from meetings, work, or from getting things needed for daily living? No        Food Insecurity    Within the past 12 months, you worried that your food would run out before you got the money to buy more. Never true     Within the past 12 months, the food you bought just didn't last and you didn't have money to get more. Never true        Stress    Do you feel stress - tense, restless, nervous, or anxious, or unable to sleep at night because your mind is troubled all the time - these days? To some extent        Social Connections    In a typical week, how many times do you talk on the phone with family, friends, or neighbors? Once a week     How often do you get together with friends or relatives? Once a week     How often do you attend Druze or Synagogue services? Never     Do you belong to any clubs or organizations such as Druze groups, unions, fraternal or athletic groups, or school groups? No     How often do you attend meetings of the clubs or organizations you belong to? Never     Are you , , , , never , or living with a partner? Never         Alcohol Use    Q1: How often do you have a drink containing alcohol? Never     Q2: How many drinks containing alcohol do you have on a typical day when you are drinking? Patient does not drink     Q3: How often do you have six or more drinks on one occasion? Never        OTHER    Name(s) of People in Home facility resident.                     Readmission Assessment (most recent)       Readmission Assessment - 01/03/24 1230          Readmission    Why were you hospitalized in the last 30 days? xray at [facility] showed osteomyelitis of l. knee     Why were you readmitted? New medical problem     When you left the hospital where did you go?  "Nursing Home     Did patient/caregiver refused recommended DC plan? No     Tell me about what happened between when you left the hospital and the day you returned. patient unable to state .     When did you start not feeling well? patient stated he wasnt feeling well  and facility sent him here     Did you try to manage your symptoms your self? No     Did you call anyone? No     Why? nursing home resident     Was this a planned readmission? No                      CM met with the patient  at the bedside and discussed the discharge plan. discharge booklet Patient alert and laying in bed and stated he is watching tv..  Patient verified his name ,  . Stated he lives in a place pointing to window.( Per EMR patient a resident of [facility] [phone],  and it is wheel chair accessible.  . He is not on coumadin (he is on eliquis) nor was he a dialysis patient . DME's include:a wheelchair.  Stated he takes  medication as prescribed and has no problems getting  medication. Patient plans to return there to "get better".      Discharge Plan A and Plan B have been determined by review of patient's clinical status, future medical and therapeutic needs, and coverage/benefits for post-acute care in coordination with multidisciplinary team members.  Joseph Mckeon RN CM        [phone]        "

## 2024-01-03 NOTE — PROGRESS NOTES
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 Per report NH obtained labs had had a BUN of 170 for which he was transferred to [facility] for further evaluation. In the ED the BUN of 170 was confirmed along with a creatinine of 7.5 (baseline creatinine 2-2.5 as of 2 weeks ago). Of note, K is 3.6 with CO2 of 23. A jackson was placed with 500 purulent urine out and per family over phone had been dealing with some prostate issues in the past. UA revealed a specific gravity of 1.010, pH of 6, 2+ protein (in setting of ELIZABETH), 3+ leukocytes with many bacteria and WBC clumps.     Nephrology consulted for ELIZABETH and possible dialysis needs    Interval History:   HD unable to be performed overnight due to high census, started this morning.  Noted to have had improvement in SCR on AM labs from 6.4 down to 6.0 prior to HD being started.  UOP of 800 ml/24h.  Electrolytes non-emergent.      Review of patient's allergies indicates:  No Known Allergies  Current Facility-Administered Medications   Medication Frequency    0.9%  NaCl infusion (for blood administration) Q24H PRN    0.9%  NaCl infusion Continuous    0.9%  NaCl infusion PRN    0.9%  NaCl infusion Once    acetaminophen tablet 1,000 mg Q8H    allopurinol split tablet 50 mg Daily    atorvastatin tablet 80 mg Daily    benzonatate capsule 100 mg TID PRN    DAPTOmycin (CUBICIN) 650 mg in sodium chloride 0.9% SolP 50 mL IVPB Q48H    dextrose 10% bolus 125 mL 125 mL  PRN    dextrose 10% bolus 250 mL 250 mL PRN    ertapenem (INVANZ) 500 mg in sodium chloride 0.9% 100 mL IVPB Q24H    famotidine tablet 20 mg Daily    folic acid tablet 1 mg Daily    glucagon (human recombinant) injection 1 mg PRN    glucose chewable tablet 16 g PRN    glucose chewable tablet 24 g PRN    heparin (porcine) injection 1,000 Units PRN    heparin (porcine) injection 5,000 Units Q8H    HYDROmorphone injection 0.5 mg Q6H PRN    HYDROmorphone injection 2 mg Q6H PRN    insulin aspart U-100 pen 0-5 Units QID (AC + HS) PRN    LIDOcaine 5 % patch 1 patch Q24H    magnesium oxide tablet 800 mg PRN    methocarbamoL tablet 500 mg QID PRN    mupirocin 2 % ointment BID    naloxone 0.4 mg/mL injection 0.02 mg PRN    oxyCODONE immediate release tablet 5 mg Q6H PRN    potassium bicarbonate disintegrating tablet 40 mEq Once    senna-docusate 8.6-50 mg per tablet 2 tablet BID PRN    sodium chloride 0.9% bolus 250 mL 250 mL PRN    sodium chloride 0.9% flush 10 mL Q12H PRN       Objective:     Vital Signs (Most Recent):  Temp: 98.5 °F (36.9 °C) (01/03/24 1115)  Pulse: 79 (01/03/24 1200)  Resp: 15 (01/03/24 1200)  BP: 134/61 (01/03/24 1200)  SpO2: 97 % (01/03/24 1200) Vital Signs (24h Range):  Temp:  [97.6 °F (36.4 °C)-98.5 °F (36.9 °C)] 98.5 °F (36.9 °C)  Pulse:  [79-80] 79  Resp:  [14-33] 15  SpO2:  [92 %-98 %] 97 %  BP: ()/(52-83) 134/61     Weight: 80.7 kg (178 lb) (12/29/23 0703)  Body mass index is 24.83 kg/m².  Body surface area is 2.01 meters squared.    I/O last 3 completed shifts:  In: 1162.6 [P.O.:540; I.V.:374.6; IV Piggyback:248]  Out: 1160 [Urine:1160]     Physical Exam  Vitals and nursing note reviewed.   Constitutional:       General: He is not in acute distress.     Appearance: He is ill-appearing. He is not toxic-appearing.      Interventions: Nasal cannula in place.   HENT:      Head: Normocephalic and atraumatic.      Nose: Nose normal.      Mouth/Throat:      Mouth: Mucous membranes are dry.       Pharynx: No oropharyngeal exudate.   Eyes:      General: No scleral icterus.        Right eye: No discharge.         Left eye: No discharge.   Cardiovascular:      Rate and Rhythm: Normal rate.   Pulmonary:      Effort: Pulmonary effort is normal. No respiratory distress.      Breath sounds: No wheezing or rales.   Abdominal:      General: Abdomen is flat.   Musculoskeletal:      Cervical back: Normal range of motion. No rigidity.      Right lower leg: No edema.      Left lower leg: Edema present.      Comments: Bilateral heels wrapped in ACE bandages.    Lymphadenopathy:      Cervical: No cervical adenopathy.   Neurological:      Mental Status: He is alert and easily aroused.      Comments: Alert, slow to respond   Psychiatric:         Behavior: Behavior is uncooperative.          Significant Labs:  CBC:   Recent Labs   Lab 01/03/24  0316   WBC 8.22   RBC 3.33*   HGB 8.5*   HCT 27.1*      MCV 81*   MCH 25.5*   MCHC 31.4*     CMP:   Recent Labs   Lab 01/03/24  0316   GLU 83   CALCIUM 8.8   ALBUMIN 1.8*   PROT 6.4      K 3.4*   CO2 23      BUN 88*   CREATININE 6.0*   ALKPHOS 90   ALT 41   AST 31   BILITOT 0.4        Assessment/Plan:     Renal/  Acute renal failure superimposed on stage 3b chronic kidney disease  Baseline creatinine 2-2.5 (longstanding DM with bilateral osteomyelitis, HTN)  Multiple comorbitites as well     On admission serum creatinine 7.5  ELIZABETH appears to be multifactorial, possible prolonged pre-renal state/ATN from hemodynamic changes from CHB vs progressive CKD.   Obstruction relieved by jackson placed 12/28/23 with 500 cc of purulent UOP. Supposedly follows with urology in outpatient setting. Is on tamsulosin.   Pyelonephritis with UA showing 3+ leuks with many bacteria. History of proteus in past.   Low effective circulating volume suggested by physical exam, evidence of acute liver injury in setting of HFpEF with severe aortic stenosis. Home medication included metolazone 1  mg bid     Dialysis consent obtained 12/28/23     Plan/Recommendation  HD today for metabolic clearance only, no net UF.    -check renal US  -repeat UA with UPCR/Urine lytes/Urine micro/alb ration  Continue jackson and monitor UOP  -Strict ins and outs  -Avoid nephrotoxic agents if possible and renally dose medications  -Avoid drastic hemodynamic changes if possible        Tico Hicks, NP  Nephrology  [facility]

## 2024-01-03 NOTE — PT/OT/SLP PROGRESS
Speech Language Pathology      Chato Bowie  MRN: [id number]    Patient not seen today secondary to  (pt NPO for possible procedure today). Will follow-up 1/4.

## 2024-01-03 NOTE — SUBJECTIVE & OBJECTIVE
Interval History: Had an episode of unresponsiveness on 1/1 and TdP which resolved after 10-20 seconds of chest compressions. After that he was sinus with complete heart block. TVP was placed and he has been paced at a rate of 80. Underlying rhythm still sinus with complete heart block.    Waxing and waning mentation. Early this morning he was lucid and having full discussion with me, mentioning he would like the pacemaker. Yesterday morning he was disoriented. Later this morning he is moaning in pain (getting IV pain meds from primary), is disoriented, and not following commands. Overall trajectory is not improving. Primary team discussing hospice.     sodium chloride 0.9%   Intravenous Once    acetaminophen  1,000 mg Oral Q8H    allopurinoL  50 mg Oral Daily    atorvastatin  80 mg Oral Daily    DAPTOmycin (CUBICIN) IV (PEDS and ADULTS)  8 mg/kg Intravenous Q48H    ertapenem (INVANZ) IVPB  500 mg Intravenous Q24H    famotidine  20 mg Oral Daily    folic acid  1 mg Oral Daily    heparin (porcine)  5,000 Units Subcutaneous Q8H    LIDOcaine  1 patch Transdermal Q24H    mupirocin   Nasal BID    potassium bicarbonate  40 mEq Oral Once       0.9%  NaCl infusion (for blood administration), sodium chloride 0.9%, benzonatate, dextrose 10%, dextrose 10%, glucagon (human recombinant), glucose, glucose, heparin (porcine), HYDROmorphone, HYDROmorphone, insulin aspart U-100, magnesium oxide, methocarbamoL, naloxone, oxyCODONE, senna-docusate 8.6-50 mg, sodium chloride 0.9%, sodium chloride 0.9%      Review of Systems   Unable to perform ROS: Mental status change     Objective:     Vital Signs (Most Recent):  Temp: 98 °F (36.7 °C) (01/03/24 0815)  Pulse: 79 (01/03/24 1115)  Resp: 18 (01/03/24 1115)  BP: (!) 151/71 (01/03/24 1115)  SpO2: 97 % (01/03/24 1115) Vital Signs (24h Range):  Temp:  [97.6 °F (36.4 °C)-98 °F (36.7 °C)] 98 °F (36.7 °C)  Pulse:  [79-80] 79  Resp:  [14-33] 18  SpO2:  [92 %-98 %] 97 %  BP: ()/(52-83)  151/71     Weight: 80.7 kg (178 lb)  Body mass index is 24.83 kg/m².     SpO2: 97 %    Telemetry : Complete heart block, HR 49. In the AM patient had an episode of TdP and then sinus tachy with CHB.     Physical Exam  Constitutional:       General: He is not in acute distress.     Appearance: He is ill-appearing.   HENT:      Mouth/Throat:      Mouth: Mucous membranes are dry.   Cardiovascular:      Rate and Rhythm: Normal rate.      Heart sounds: Murmur heard.      Comments: TVP-paced at 80  Pulmonary:      Effort: Pulmonary effort is normal.      Breath sounds: Normal breath sounds.   Musculoskeletal:      Right lower leg: No edema.      Left lower leg: No edema.   Skin:     General: Skin is warm.   Neurological:      Mental Status: He is disoriented.          Significant Labs:     Recent Labs   Lab 24  0630 24  0125 24  0316   WBC 7.97 9.68 8.22   HGB 8.2* 8.1* 8.5*   HCT 25.7* 26.8* 27.1*    292 294         Recent Labs   Lab 24  0630 24  1133 24  0125 24  0316    138 141 137   K 3.5 4.0 3.7 3.4*   CL 99 99 100 100   CO2  24 21* 23   BUN 85* 85* 88* 88*   CREATININE 5.5* 5.6* 6.4* 6.0*   CALCIUM 9.1 8.8 9.2 8.8   PHOS 4.4  --  5.0* 4.9*         Recent Labs   Lab 24  0420 24  0125 24  0316   ALKPHOS 149* 91 90   BILITOT 0.7 0.5 0.4   PROT 6.2 6.6 6.4   ALT 62* 55* 41   AST 48* 40 31             Recent Labs   Lab 23  2324 24  0630   *  --    TROPONINI  --  0.384*       Significant Imagin/29/23    Left Ventricle: The left ventricle is normal in size. Ventricular mass is normal. Normal wall thickness. There is concentric remodeling. Septal motion is consistent with bundle branch block. There is normal systolic function with a visually estimated ejection fraction of 55 - 60%. Grade II diastolic dysfunction.    Right Ventricle: Normal right ventricular cavity size. Systolic function is normal.    Left Atrium: Left  atrium is severely dilated.    Aortic Valve: There is moderate aortic valve sclerosis. Moderately restricted motion. There is severe stenosis. Aortic valve area by VTI is 0.86 cm². Aortic valve peak velocity is 4.12 m/s. Mean gradient is 43 mmHg. The dimensionless index is 0.22. There is mild to moderate aortic regurgitation.    Mitral Valve: There is bileaflet sclerosis. There is mild annular dilation present.    Tricuspid Valve: There is mild to moderate regurgitation.    Pulmonary Artery: The estimated pulmonary artery systolic pressure is 72 mmHg.    IVC/SVC: Intermediate venous pressure at 8 mmHg.

## 2024-01-03 NOTE — PROGRESS NOTES
[facility]  Cardiology  Progress Note    Patient Name: Chato Bowie  MRN: [id number]  Admission Date: 12/28/2023  Hospital Length of Stay: 6 days  Code Status: DNR   Attending Physician: Stephen Werner MD   Primary Care Physician: Irlanda Valenzuela -  Expected Discharge Date: 1/5/2024  Principal Problem:Complete heart block    Subjective:     Hospital Course:   Admitted for CHB which EP thought to be 2/2 uremia/ELIZABETH and infection. Has not required pacing and is HDS. Recommended holding AV iker agents. BUN and sCr newly elevated, seen by nephrology who started dialysis for clearance. Tolerated dialysis with plans to further dialyze. UCx growing GNR, on cefepime. Continued daptomycin for his BL heel OM. EP holding on PPM insertion for now, would like to see if he improves with dialysis; likely a better candidate for leadless pacemaker given his multiple infections. Consider ID consult for his multidrug resistant organisms and possible pyelonephritis to ensure good source control. Mildly delirious which could be 2/2 his uremia and infection, further assessing with CT head which was unremarkable. Stepped down to  but came back to CCU after he went into torsades on 12/31. CPR was initiated and Pt regained pulse and consciousness; bradycardic post event with HR ~50. Electrolytes replaced. TVP placed in CCU; spoke with SID who confirmed a DNR status but that she would want a pacemaker placed. EP consulted for PPM insertion. Had another episode of torsades (~ 2 mins) that became CHB and then was paced to NSR. TVP placed. EP planning for micra insertion once on abx for his UTI for 72 hours. Code status will need to be reversed to full for the procedure, attempting to contact jero to further discuss. Palliative care consulted for GOC/ACP.    Interval History:     Review of Systems   Unable to perform ROS: Mental status change     Objective:     Vital Signs (Most Recent):  Temp: 98 °F (36.7 °C)  (01/03/24 0815)  Pulse: 79 (01/03/24 1115)  Resp: 18 (01/03/24 1115)  BP: (!) 151/71 (01/03/24 1115)  SpO2: 97 % (01/03/24 1115) Vital Signs (24h Range):  Temp:  [97.6 °F (36.4 °C)-98 °F (36.7 °C)] 98 °F (36.7 °C)  Pulse:  [79-80] 79  Resp:  [14-33] 18  SpO2:  [92 %-98 %] 97 %  BP: ()/(52-83) 151/71     Weight: 80.7 kg (178 lb)  Body mass index is 24.83 kg/m².     SpO2: 97 %         Intake/Output Summary (Last 24 hours) at 1/3/2024 1157  Last data filed at 1/3/2024 1115  Gross per 24 hour   Intake 577.36 ml   Output 821 ml   Net -243.64 ml         Lines/Drains/Airways       Peripherally Inserted Central Catheter Line  Duration             PICC Double Lumen 12/07/23 1457 right brachial 26 days              Central Venous Catheter Line  Duration             Trialysis (Dialysis) Catheter 12/29/23 2117 right internal jugular 4 days     Introducer Single Lumen 01/01/24 0927 Internal Jugular Left 2 days              Drain  Duration                  Urethral Catheter 12/28/23 1600 Double-lumen 16 Fr. 5 days              Line  Duration                  Pacer Wires 01/01/24 0928 2 days                       Physical Exam  Vitals and nursing note reviewed.   Constitutional:       General: He is not in acute distress.     Appearance: He is ill-appearing.   HENT:      Mouth/Throat:      Mouth: Mucous membranes are dry.   Eyes:      Extraocular Movements: Extraocular movements intact.      Pupils: Pupils are equal, round, and reactive to light.   Cardiovascular:      Rate and Rhythm: Normal rate and regular rhythm.      Pulses: Normal pulses.      Heart sounds: Murmur (ejection systolic murmur) heard.   Pulmonary:      Effort: Pulmonary effort is normal.      Breath sounds: Normal breath sounds. No wheezing, rhonchi or rales.   Abdominal:      General: Bowel sounds are normal. There is no distension.      Palpations: Abdomen is soft.      Tenderness: There is no abdominal tenderness.   Musculoskeletal:         General: No  tenderness.      Right lower leg: No edema.      Left lower leg: No edema.   Skin:     General: Skin is warm and dry.      Capillary Refill: Capillary refill takes less than 2 seconds.      Findings: No erythema or rash.   Neurological:      Mental Status: He is lethargic, disoriented and confused.            Significant Labs: CMP   Recent Labs   Lab 01/02/24  0125 01/03/24 0316    137   K 3.7 3.4*    100   CO2 21* 23   GLU 75 83   BUN 88* 88*   CREATININE 6.4* 6.0*   CALCIUM 9.2 8.8   PROT 6.6 6.4   ALBUMIN 1.9* 1.8*   BILITOT 0.5 0.4   ALKPHOS 91 90   AST 40 31   ALT 55* 41   ANIONGAP 20* 14     , CBC   Recent Labs   Lab 01/02/24  0125 01/03/24 0316   WBC 9.68 8.22   HGB 8.1* 8.5*   HCT 26.8* 27.1*    294     , All pertinent lab results from the last 24 hours have been reviewed., and   Recent Lab Results  (Last 5 results in the past 24 hours)        01/03/24  0941   01/03/24  0316   01/02/24  2144   01/02/24  1714   01/02/24  1418        Albumin   1.8             ALP   90             ALT   41             Anion Gap   14             AST   31             Baso #   0.07             Basophil %   0.9             BILIRUBIN TOTAL   0.4  Comment: For infants and newborns, interpretation of results should be based  on gestational age, weight and in agreement with clinical  observations.    Premature Infant recommended reference ranges:  Up to 24 hours.............<8.0 mg/dL  Up to 48 hours............<12.0 mg/dL  3-5 days..................<15.0 mg/dL  6-29 days.................<15.0 mg/dL               BUN   88             Calcium   8.8             Chloride   100             CO2   23             Creatinine   6.0             Differential Method   Automated             eGFR   9.3             Eos #   0.4             Eosinophil %   4.6             Glucose   83             Gran # (ANC)   5.9             Gran %   72.1             Hematocrit   27.1             Hemoglobin   8.5             Immature Grans (Abs)    0.05  Comment: Mild elevation in immature granulocytes is non specific and   can be seen in a variety of conditions including stress response,   acute inflammation, trauma and pregnancy. Correlation with other   laboratory and clinical findings is essential.               Immature Granulocytes   0.6             Lymph #   0.8             Lymph %   9.5             Magnesium    3.4             MCH   25.5             MCHC   31.4             MCV   81             Mono #   1.0             Mono %   12.3             MPV   9.6             nRBC   0             Phosphorus Level   4.9             Platelet Count   294             POCT Glucose 87     97   90   83       Potassium   3.4             PROTEIN TOTAL   6.4             RBC   3.33             RDW   18.1             Sodium   137             WBC   8.22                                  Significant Imaging:  All imaging from the past 24 hours has been reviewed  Assessment and Plan:     * Complete heart block  Patient with complete heart block, previous EKG with first degree AV block and LBBB  Patient on metoprolol and amiodarone as an outpatient  HR in the 30s on admission, had improved to ~ 45   EP consulted, thought it could be 2/2 uremia/ELIZABETH/infection; held off on TVP initially  Stepped down to  with plans to see how he improved with dialysis  However went into torsades on 12/31  TVP placed by IC; EP planning to do PPM insertion    Torsades de pointes  Went into torsades overnight 12/31; HR went to 160-170, then Pt lost consciousness and pulse. CPR initiated, Pt regained consciousness and pulse then was transferred back to CCU  Received Mg and K although levels were not critical  Medications reviewed - no obvious meds that could be prolonging QTc  TVP placed by IC  Spoke with SID who confirmed DNR code status but she is happy for a PPM to be inserted  Had another episode of torsades on 1/1 for about 2 mins which then converted to CHB which he was paced out of into a HR ~  65; spoke with EP who advised to overpace him  Remain on telemetry  Keep K > 4, Mg > 2    Osteomyelitis of foot  Admitted on 11/27 from NH for b/l wounds. 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 Wound swab of R ulcer with MRSA.   s/p bone biopsy and debridement by podiatry   On 6 weeks of IV abx Dapto with end date end of 1/10/2024    Advanced care planning/counseling discussion  Cardiology team spoke with MPOPATRICIA over the phone who confirmed DNR status however advised that she would like a PPM inserted    Acute encephalopathy  Likely uremic encephalopathy in the setting of ARF with  and asterixis on exam. Infections (UTI and OM) could also be contributing  Remains disoriented but alertness improved, answers some questions appropriately. Oriented to place and month.   Currently able to protect airway but will continue to monitor  CT head unremarkable  Continue ertapenem for UTI  Continue daptomycin for OM  SLP advised for minced/moist diet    Bradycardia  See CHB    UTI (urinary tract infection)  Suspect this patient has Complicated cystitis given retention and positive UA  UCx growing multi drug resistant E coli  ID following, started on ertapenem, anticipate 7 day course, last day 1/6    Paroxysmal atrial flutter  On eliquis at home. Unknown last dose  - Will hold off restating due to likely PPM insertion    Severe aortic valve stenosis  TTE:  Left Ventricle: The left ventricle is normal in size. Ventricular mass is normal. Normal wall thickness. There is concentric remodeling. Septal motion is consistent with bundle branch block. There is normal systolic function with a visually estimated ejection fraction of 55 - 60%. Grade II diastolic dysfunction.    Right Ventricle: Normal right ventricular cavity size. Systolic function is normal.    Left Atrium: Left atrium is severely dilated.    Aortic Valve: There is moderate aortic valve sclerosis. Moderately restricted motion. There is severe stenosis.  Aortic valve area by VTI is 0.86 cm². Aortic valve peak velocity is 4.12 m/s. Mean gradient is 43 mmHg. The dimensionless index is 0.22. There is mild to moderate aortic regurgitation.    Mitral Valve: There is bileaflet sclerosis. There is mild annular dilation present.    Tricuspid Valve: There is mild to moderate regurgitation.    Pulmonary Artery: The estimated pulmonary artery systolic pressure is 72 mmHg.    IVC/SVC: Intermediate venous pressure at 8 mmHg.    Acute renal failure superimposed on stage 3b chronic kidney disease  Admitted with ELIZABETH on CKD  Initial  with sCr 7.5; improving with dialysis  Nephrology consulted, started on HD for clearance  Still making some urine  Dialysis per nephro    GERD (gastroesophageal reflux disease)  Continue famotidine    Anemia  Likely multifactorial given CKD and infection. No signs of bleeding.  Has not received any PRBCs to date. Workup pending. Denies hematemesis, melena, hematochezia, other overt signs of bleeding     Current CBC reviewed-         Lab Results   Component Value Date     HGB 7.2 (L) 12/28/2023     HCT 22.9 (L) 12/28/2023      - Will obtain cnosent and transfuse if Hb < 7    Type 2 diabetes mellitus with kidney complication, with long-term current use of insulin  Last HbA1c 5.5  POCT TIDWM and prior to bed  LDSSI  Monitor BGLs, start long acting insulin if needed    Dyslipidemia  Continue statin    Epigastric abdominal pain  CTAP showing cholelithiasis, no evidence of acute cholecystitis, anasarca      VTE Risk Mitigation (From admission, onward)           Ordered     heparin (porcine) injection 1,000 Units  As needed (PRN)         12/30/23 1056     heparin (porcine) injection 5,000 Units  Every 8 hours         12/29/23 1321     IP VTE HIGH RISK PATIENT  Once         12/28/23 1719     Place sequential compression device  Until discontinued         12/28/23 1719                  Marianna Cullen MD  Cardiology  [facility] - [facility]

## 2024-01-04 PROBLEM — L24.A2 IRRITANT CONTACT DERMATITIS DUE TO FECAL, URINARY OR DUAL INCONTINENCE: Status: ACTIVE | Noted: 2024-01-04

## 2024-01-04 LAB
ALBUMIN SERPL BCP-MCNC: 1.9 G/DL (ref 3.5–5.2)
ALP SERPL-CCNC: 89 U/L (ref 55–135)
ALT SERPL W/O P-5'-P-CCNC: 34 U/L (ref 10–44)
ANION GAP SERPL CALC-SCNC: 10 MMOL/L (ref 8–16)
AST SERPL-CCNC: 29 U/L (ref 10–40)
BACTERIA BLD CULT: NORMAL
BACTERIA BLD CULT: NORMAL
BASOPHILS # BLD AUTO: 0.05 K/UL (ref 0–0.2)
BASOPHILS NFR BLD: 0.7 % (ref 0–1.9)
BILIRUB SERPL-MCNC: 0.5 MG/DL (ref 0.1–1)
BUN SERPL-MCNC: 49 MG/DL (ref 8–23)
CALCIUM SERPL-MCNC: 8.3 MG/DL (ref 8.7–10.5)
CHLORIDE SERPL-SCNC: 105 MMOL/L (ref 95–110)
CK SERPL-CCNC: 155 U/L (ref 20–200)
CO2 SERPL-SCNC: 24 MMOL/L (ref 23–29)
CREAT SERPL-MCNC: 4.2 MG/DL (ref 0.5–1.4)
DIFFERENTIAL METHOD BLD: ABNORMAL
EOSINOPHIL # BLD AUTO: 0.3 K/UL (ref 0–0.5)
EOSINOPHIL NFR BLD: 3.6 % (ref 0–8)
ERYTHROCYTE [DISTWIDTH] IN BLOOD BY AUTOMATED COUNT: 18 % (ref 11.5–14.5)
EST. GFR  (NO RACE VARIABLE): 14.2 ML/MIN/1.73 M^2
GLUCOSE SERPL-MCNC: 80 MG/DL (ref 70–110)
HCT VFR BLD AUTO: 26.2 % (ref 40–54)
HGB BLD-MCNC: 8 G/DL (ref 14–18)
IMM GRANULOCYTES # BLD AUTO: 0.04 K/UL (ref 0–0.04)
IMM GRANULOCYTES NFR BLD AUTO: 0.5 % (ref 0–0.5)
LYMPHOCYTES # BLD AUTO: 0.8 K/UL (ref 1–4.8)
LYMPHOCYTES NFR BLD: 11.2 % (ref 18–48)
MAGNESIUM SERPL-MCNC: 2.7 MG/DL (ref 1.6–2.6)
MCH RBC QN AUTO: 25 PG (ref 27–31)
MCHC RBC AUTO-ENTMCNC: 30.5 G/DL (ref 32–36)
MCV RBC AUTO: 82 FL (ref 82–98)
MONOCYTES # BLD AUTO: 1 K/UL (ref 0.3–1)
MONOCYTES NFR BLD: 13.9 % (ref 4–15)
NEUTROPHILS # BLD AUTO: 5.2 K/UL (ref 1.8–7.7)
NEUTROPHILS NFR BLD: 70.1 % (ref 38–73)
NRBC BLD-RTO: 0 /100 WBC
PHOSPHATE SERPL-MCNC: 3 MG/DL (ref 2.7–4.5)
PLATELET # BLD AUTO: 255 K/UL (ref 150–450)
PMV BLD AUTO: 9.4 FL (ref 9.2–12.9)
POCT GLUCOSE: 108 MG/DL (ref 70–110)
POCT GLUCOSE: 92 MG/DL (ref 70–110)
POTASSIUM SERPL-SCNC: 3.4 MMOL/L (ref 3.5–5.1)
PROT SERPL-MCNC: 6.2 G/DL (ref 6–8.4)
RBC # BLD AUTO: 3.2 M/UL (ref 4.6–6.2)
SODIUM SERPL-SCNC: 139 MMOL/L (ref 136–145)
WBC # BLD AUTO: 7.42 K/UL (ref 3.9–12.7)

## 2024-01-04 PROCEDURE — 25000003 PHARM REV CODE 250

## 2024-01-04 PROCEDURE — 99497 ADVNCD CARE PLAN 30 MIN: CPT | Mod: ,,, | Performed by: STUDENT IN AN ORGANIZED HEALTH CARE EDUCATION/TRAINING PROGRAM

## 2024-01-04 PROCEDURE — 99233 SBSQ HOSP IP/OBS HIGH 50: CPT | Mod: ,,, | Performed by: NURSE PRACTITIONER

## 2024-01-04 PROCEDURE — 20000000 HC ICU ROOM

## 2024-01-04 PROCEDURE — 25000003 PHARM REV CODE 250: Performed by: STUDENT IN AN ORGANIZED HEALTH CARE EDUCATION/TRAINING PROGRAM

## 2024-01-04 PROCEDURE — 83735 ASSAY OF MAGNESIUM: CPT | Performed by: INTERNAL MEDICINE

## 2024-01-04 PROCEDURE — 82550 ASSAY OF CK (CPK): CPT | Performed by: INTERNAL MEDICINE

## 2024-01-04 PROCEDURE — 25000003 PHARM REV CODE 250: Performed by: INTERNAL MEDICINE

## 2024-01-04 PROCEDURE — 85025 COMPLETE CBC W/AUTO DIFF WBC: CPT | Performed by: INTERNAL MEDICINE

## 2024-01-04 PROCEDURE — 27000207 HC ISOLATION

## 2024-01-04 PROCEDURE — 25000003 PHARM REV CODE 250: Performed by: PHYSICIAN ASSISTANT

## 2024-01-04 PROCEDURE — 99222 1ST HOSP IP/OBS MODERATE 55: CPT | Mod: ,,, | Performed by: NURSE PRACTITIONER

## 2024-01-04 PROCEDURE — 63600175 PHARM REV CODE 636 W HCPCS: Performed by: STUDENT IN AN ORGANIZED HEALTH CARE EDUCATION/TRAINING PROGRAM

## 2024-01-04 PROCEDURE — 80053 COMPREHEN METABOLIC PANEL: CPT | Performed by: INTERNAL MEDICINE

## 2024-01-04 PROCEDURE — 84100 ASSAY OF PHOSPHORUS: CPT | Performed by: INTERNAL MEDICINE

## 2024-01-04 PROCEDURE — 63600175 PHARM REV CODE 636 W HCPCS

## 2024-01-04 PROCEDURE — 99233 SBSQ HOSP IP/OBS HIGH 50: CPT | Mod: ,,, | Performed by: STUDENT IN AN ORGANIZED HEALTH CARE EDUCATION/TRAINING PROGRAM

## 2024-01-04 PROCEDURE — 92526 ORAL FUNCTION THERAPY: CPT

## 2024-01-04 PROCEDURE — 99291 CRITICAL CARE FIRST HOUR: CPT | Mod: ,,, | Performed by: INTERNAL MEDICINE

## 2024-01-04 PROCEDURE — 63600175 PHARM REV CODE 636 W HCPCS: Performed by: PHYSICIAN ASSISTANT

## 2024-01-04 RX ORDER — HYDRALAZINE HYDROCHLORIDE 20 MG/ML
10 INJECTION INTRAMUSCULAR; INTRAVENOUS ONCE
Status: COMPLETED | OUTPATIENT
Start: 2024-01-04 | End: 2024-01-04

## 2024-01-04 RX ADMIN — ACETAMINOPHEN 1000 MG: 500 TABLET ORAL at 05:01

## 2024-01-04 RX ADMIN — FOLIC ACID 1 MG: 1 TABLET ORAL at 09:01

## 2024-01-04 RX ADMIN — ALLOPURINOL 50 MG: 300 TABLET ORAL at 09:01

## 2024-01-04 RX ADMIN — HEPARIN SODIUM 5000 UNITS: 5000 INJECTION INTRAVENOUS; SUBCUTANEOUS at 02:01

## 2024-01-04 RX ADMIN — LIDOCAINE 5% 1 PATCH: 700 PATCH TOPICAL at 07:01

## 2024-01-04 RX ADMIN — ERTAPENEM 500 MG: 1 INJECTION INTRAMUSCULAR; INTRAVENOUS at 11:01

## 2024-01-04 RX ADMIN — HYDROMORPHONE HYDROCHLORIDE 0.5 MG: 0.5 INJECTION, SOLUTION INTRAMUSCULAR; INTRAVENOUS; SUBCUTANEOUS at 04:01

## 2024-01-04 RX ADMIN — ACETAMINOPHEN 1000 MG: 500 TABLET ORAL at 02:01

## 2024-01-04 RX ADMIN — HEPARIN SODIUM 5000 UNITS: 5000 INJECTION INTRAVENOUS; SUBCUTANEOUS at 11:01

## 2024-01-04 RX ADMIN — POTASSIUM BICARBONATE 40 MEQ: 391 TABLET, EFFERVESCENT ORAL at 09:01

## 2024-01-04 RX ADMIN — MUPIROCIN: 20 OINTMENT TOPICAL at 11:01

## 2024-01-04 RX ADMIN — HYDRALAZINE HYDROCHLORIDE 10 MG: 20 INJECTION, SOLUTION INTRAMUSCULAR; INTRAVENOUS at 05:01

## 2024-01-04 RX ADMIN — FAMOTIDINE 20 MG: 20 TABLET, FILM COATED ORAL at 09:01

## 2024-01-04 RX ADMIN — MUPIROCIN: 20 OINTMENT TOPICAL at 09:01

## 2024-01-04 RX ADMIN — HEPARIN SODIUM 5000 UNITS: 5000 INJECTION INTRAVENOUS; SUBCUTANEOUS at 05:01

## 2024-01-04 RX ADMIN — ATORVASTATIN CALCIUM 80 MG: 40 TABLET, FILM COATED ORAL at 09:01

## 2024-01-04 NOTE — CONSULTS
[facility] - [facility]  Skin Integrity CHAD  Consult Note    Patient Name: Chato Bowie  MRN: [id number]  Admission Date: 12/28/2023  Hospital Length of Stay: 7 days  Attending Physician: Stephen Werner MD  Primary Care Provider: Irlanda Valenzuela -     Inpatient consult to Skin Integrity  Practitioner  Consult performed by: Paola Alfonso, NP  Consult ordered by: Marianna Cullen MD        Subjective:     History of Present Illness:  Chato Bowie is a 73 year old male with hx of T2DM, HTN, HLD, CHF, CAD, HLD, CKD 3b, alcohol use disorder, and multiple thyroid nodules presented from [facility] with RLQ pain, fatigue, bradycardia ~30's, weakness, and lab abnormalities including Cr 7.1 and . 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  On interview patient lethargic and disoriented, history obtained via chart review.     In ED pt AF, bradycardic ~40's, RR in the mid 20's, BP ~110/50, satting 100% on 2L NC. Labs significant for Hb 7.2, ANC 8.3, Na 132, Cl 87, HCO3 23, AG 22, , Cr 7.5, phos 8.3, Albumin 1.9, AST 74, , Lipase 146. UA with 2+ protein, 2+ occult blood, 3+ leukocytes, >100WBC and many bacteria. EKG showed wide QRS with LBBB, vent rate 45, QT/QTc 652/563. CXR showed mild retraction of R-sided PICC into mid SVC. Admitting to [facility] for further management. Skin integrity CHAD consulted for evaluation of skin injury.    Scheduled Meds:   sodium chloride 0.9%   Intravenous Once    acetaminophen  1,000 mg Oral Q8H    allopurinoL  50 mg Oral Daily    atorvastatin  80 mg Oral Daily    DAPTOmycin (CUBICIN) IV (PEDS and ADULTS)  8 mg/kg Intravenous Q48H    ertapenem (INVANZ) IVPB  500 mg Intravenous Q24H    famotidine  20 mg Oral Daily    folic acid  1 mg Oral Daily    heparin (porcine)  5,000 Units Subcutaneous Q8H     LIDOcaine  1 patch Transdermal Q24H    mupirocin   Nasal BID     Continuous Infusions:   sodium chloride 0.9% 10 mL/hr at 01/04/24 1200     PRN Meds:0.9%  NaCl infusion (for blood administration), sodium chloride 0.9%, benzonatate, dextrose 10%, dextrose 10%, glucagon (human recombinant), glucose, glucose, heparin (porcine), HYDROmorphone, insulin aspart U-100, magnesium oxide, methocarbamoL, naloxone, oxyCODONE, senna-docusate 8.6-50 mg, sodium chloride 0.9%, sodium chloride 0.9%    Review of patient's allergies indicates:  No Known Allergies     Past Medical History:   Diagnosis Date    Acute congestive heart failure 3/20/2020    Alcohol abuse     Arthritis     Asthma attack 11/24/2021    Coronary artery disease     Diabetes mellitus     Hyperlipemia     Hypertension     Multiple thyroid nodules 6/14/2023    Rhabdomyolysis      Past Surgical History:   Procedure Laterality Date    ECHOCARDIOGRAM,TRANSESOPHAGEAL N/A 9/21/2023    Procedure: Transesophageal echo (MARIA) intra-procedure log documentation;  Surgeon: Luisana Rodríguez MD;  Location: [facility] CATH LAB;  Service: Cardiology;  Laterality: N/A;    EYE SURGERY      IRRIGATION AND DEBRIDEMENT Bilateral 12/1/2023    Procedure: IRRIGATION AND DEBRIDEMENT;  Surgeon: Jenna Gutiérrez DPM;  Location: [facility] OR;  Service: Podiatry;  Laterality: Bilateral;  Request antibiotic beads    TRANSESOPHAGEAL ECHOCARDIOGRAM WITH POSSIBLE CARDIOVERSION (MARIA W/ POSS CARDIOVERSION) N/A 1/5/2023    Procedure: Transesophageal echo (MARIA) intra-procedure log documentation;  Surgeon: Indra Foote MD;  Location: [facility] CATH LAB;  Service: Cardiology;  Laterality: N/A;    TRANSESOPHAGEAL ECHOCARDIOGRAPHY N/A 9/21/2023    Procedure: ECHOCARDIOGRAM, TRANSESOPHAGEAL;  Surgeon: Luisana Rodríguez MD;  Location: [facility] CATH LAB;  Service: Cardiology;  Laterality: N/A;    TREATMENT OF CARDIAC ARRHYTHMIA N/A 12/7/2020    Procedure: Cardioversion or Defibrillation;  Surgeon: Indra Foote MD;   Location: [facility] CATH LAB;  Service: Cardiology;  Laterality: N/A;    TREATMENT OF CARDIAC ARRHYTHMIA N/A 12/30/2020    Procedure: Cardioversion or Defibrillation;  Surgeon: Tyrone Steen MD;  Location: [facility] CATH LAB;  Service: Cardiology;  Laterality: N/A;    TREATMENT OF CARDIAC ARRHYTHMIA N/A 1/5/2023    Procedure: Cardioversion or Defibrillation;  Surgeon: Indra Foote MD;  Location: [facility] CATH LAB;  Service: Cardiology;  Laterality: N/A;    VASCULAR SURGERY         Family History       Problem Relation (Age of Onset)    Diabetes Mother, Father, Sister    Hypertension Mother, Father, Sister          Tobacco Use    Smoking status: Never    Smokeless tobacco: Never   Substance and Sexual Activity    Alcohol use: Not Currently     Alcohol/week: 6.0 standard drinks of alcohol     Types: 6 Cans of beer per week    Drug use: No    Sexual activity: Yes     Partners: Female     Review of Systems   Skin:  Positive for wound.       Objective:     Vital Signs (Most Recent):  Temp: 98.7 °F (37.1 °C) (01/04/24 1101)  Pulse: 79 (01/04/24 1200)  Resp: 18 (01/04/24 1200)  BP: (!) 153/73 (01/04/24 1200)  SpO2: 98 % (01/04/24 1200) Vital Signs (24h Range):  Temp:  [97.9 °F (36.6 °C)-98.7 °F (37.1 °C)] 98.7 °F (37.1 °C)  Pulse:  [79-81] 79  Resp:  [13-30] 18  SpO2:  [93 %-100 %] 98 %  BP: (122-161)/() 153/73     Weight: 80.7 kg (178 lb)  Body mass index is 24.83 kg/m².     Physical Exam  Constitutional:       Appearance: Normal appearance.   Skin:     General: Skin is warm and dry.      Findings: Lesion present.   Neurological:      Mental Status: He is alert.          Laboratory:  All pertinent labs reviewed within the last 24 hours.    Diagnostic Results:  None      Assessment/Plan:         CHAD Skin Integrity Evaluation      Skin Integrity CHAD evaluation of patient as part of the comprehensive skin care team.     He has been admitted for 7 days. Skin injury was noted on 12/28/23. POA  yes.    Sacrum/buttocks      Derm  Irritant contact dermatitis due to fecal, urinary or dual incontinence  - consult received for evaluation of skin injury.  - pt presented from [facility] with RLQ pain, fatigue, bradycardia ~30's, weakness, and lab abnormalities including Cr 7.1 and .   - scattered areas of healing partial thickness tissue loss to sacrum with pale pink wound base.   - likely due to moisture from incontinence and friction. Stool episode noted during assessment.  - continue foam dressing to area.  - Immerse surface.  - heel foams in place.  - wedge for offloading.  - jackson catheter.  - turn q2h.  - most recent louisa score of 14 with nutrition sub scale score of 3.  - RD following. Optimize nutrition for wound healing.  - nursing to maintain pressure injury prevention measures and continue wound care per orders.        Thank you for your consult. I will follow-up with patient. Please contact us if you have any additional questions.      Paola Alfonso NP  Skin Integrity CHAD  [facility] - [facility]

## 2024-01-04 NOTE — ASSESSMENT & PLAN NOTE
Patient with complete heart block, Previous EKG with first degree AV block and LBBB. Patient on metoprolol and amiodarone as an outpatient  Patient hemodynamically stable, but is unable to give a reliable history  Had an episode of Torsades de Pointes this am, and then sinus tachycardia with complete heart block  Evaluated by ID who recommended ATB therapy anticipated for 7 days   DNR for now    On longer discussion with the patient and HCPOA (niece), they are okay with reversing code status if needed for pacemaker placement. Code status will likely be reversed for the procedure and then back to DNR afterward.    Recommendations:  -TVP placement due to acute event  -Hold AV iker agents  -Primary discussing hospice and goals of care with family  -Micra pending scheduling

## 2024-01-04 NOTE — PLAN OF CARE
"POC reviewed with Chato Bowie and family. Questions and concerns addressed. Patient -170s, MD notified and ordered 10 mg Hydralazine. Patient was AAOx3, except time, and at 3 pm, he became disoriented to time, place, and situation. Patient reoriented frequently. Patient refused wound care and bath. No other acute events. See below and flowsheets for full assessment and VS info.       Neuro:  [place] Coma Scale  Best Eye Response: 4-->(E4) spontaneous  Best Motor Response: 6-->(M6) obeys commands  Best Verbal Response: 4-->(V4) confused  [place] Coma Scale Score: 14  Assessment Qualifiers: patient not sedated/intubated  Pupil PERRLA: yes    24 hr Temp:  [97.9 °F (36.6 °C)-98.7 °F (37.1 °C)]     CV:  Rhythm: paced rhythm   DVT prophylaxis: VTE Required Core Measure: Pharmacological prophylaxis initiated/maintained                            Pulses  Right Radial Pulse: 2+ (normal), palpation  Left Radial Pulse: 2+ (normal), palpation  Right Dorsalis Pedis Pulse: 1+ (weak), palpation  Left Dorsalis Pedis Pulse: 1+ (weak), palpation  Right Posterior Tibial Pulse: 1+ (weak), palpation  Left Posterior Tibial Pulse: 1+ (weak), palpation    Resp:  Flow (L/min): 1       GI/:  GI prophylaxis: no  Diet/Nutrition Received: mechanical/dental soft  Last Bowel Movement: 01/02/24  Voiding Characteristics: urethral catheter (bladder)       Urethral Catheter 12/28/23 1600 Double-lumen 16 Fr.-Reason for Continuing Urinary Catheterization: Critically ill in ICU and requiring hourly monitoring of intake/output   Intake/Output Summary (Last 24 hours) at 1/4/2024 1747  Last data filed at 1/4/2024 1700  Gross per 24 hour   Intake 1399.92 ml   Output 685 ml   Net 714.92 ml          Labs/Accuchecks:  Recent Labs   Lab 01/02/24  0125 01/03/24  0316 01/04/24  0414   WBC 9.68 8.22 7.42   RBC 3.30* 3.33* 3.20*   HGB 8.1* 8.5* 8.0*   HCT 26.8* 27.1* 26.2*    294 255    No results for input(s): "PT", "INR", "APTT" in the last 168 " "hours.   Recent Labs     01/04/24  0414      K 3.4*   CO2 24      BUN 49*   CREATININE 4.2*   ALKPHOS 89   ALT 34   AST 29   BILITOT 0.5       Recent Labs   Lab 12/28/23  2324 01/01/24  0630 01/04/24  0912   *  --  155   TROPONINI  --  0.384*  --     No results for input(s): "PH", "PCO2", "PO2", "HCO3", "POCSATURATED", "BE" in the last 72 hours.    Electrolytes: No replacement orders  Accuchecks: ACHS    Gtts/LDAs:   sodium chloride 0.9% 10 mL/hr at 01/04/24 1700       Lines/Drains/Airways       Peripherally Inserted Central Catheter Line  Duration             PICC Double Lumen 12/07/23 1457 right brachial 28 days              Central Venous Catheter Line  Duration             Trialysis (Dialysis) Catheter 12/29/23 2117 right internal jugular 5 days     Introducer Single Lumen 01/01/24 0927 Internal Jugular Left 3 days              Drain  Duration                  Urethral Catheter 12/28/23 1600 Double-lumen 16 Fr. 7 days              Line  Duration                  Pacer Wires 01/01/24 0928 3 days                    Skin/Wounds  Bathing/Skin Care: incontinence care;linen changed (01/04/24 1101)  Wounds: Yes  Wound care consulted: Yes    "

## 2024-01-04 NOTE — SUBJECTIVE & OBJECTIVE
Interval History:   HD completed on yesterday. No UF. Electrolytes stable. 24 hr  mL. No distress on exam. Appears comfortable on RA.   Mentation poor. Unsure of baseline.       Review of patient's allergies indicates:  No Known Allergies  Current Facility-Administered Medications   Medication Frequency    0.9%  NaCl infusion (for blood administration) Q24H PRN    0.9%  NaCl infusion Continuous    0.9%  NaCl infusion PRN    0.9%  NaCl infusion Once    acetaminophen tablet 1,000 mg Q8H    allopurinol split tablet 50 mg Daily    atorvastatin tablet 80 mg Daily    benzonatate capsule 100 mg TID PRN    DAPTOmycin (CUBICIN) 650 mg in sodium chloride 0.9% SolP 50 mL IVPB Q48H    dextrose 10% bolus 125 mL 125 mL PRN    dextrose 10% bolus 250 mL 250 mL PRN    ertapenem (INVANZ) 500 mg in sodium chloride 0.9% 100 mL IVPB Q24H    famotidine tablet 20 mg Daily    folic acid tablet 1 mg Daily    glucagon (human recombinant) injection 1 mg PRN    glucose chewable tablet 16 g PRN    glucose chewable tablet 24 g PRN    heparin (porcine) injection 1,000 Units PRN    heparin (porcine) injection 5,000 Units Q8H    HYDROmorphone injection 0.5 mg Q6H PRN    insulin aspart U-100 pen 0-5 Units QID (AC + HS) PRN    LIDOcaine 5 % patch 1 patch Q24H    magnesium oxide tablet 800 mg PRN    methocarbamoL tablet 500 mg QID PRN    mupirocin 2 % ointment BID    naloxone 0.4 mg/mL injection 0.02 mg PRN    oxyCODONE immediate release tablet 5 mg Q6H PRN    senna-docusate 8.6-50 mg per tablet 2 tablet BID PRN    sodium chloride 0.9% bolus 250 mL 250 mL PRN    sodium chloride 0.9% flush 10 mL Q12H PRN       Objective:     Vital Signs (Most Recent):  Temp: 98.7 °F (37.1 °C) (01/04/24 1101)  Pulse: 79 (01/04/24 1200)  Resp: 18 (01/04/24 1200)  BP: (!) 153/73 (01/04/24 1200)  SpO2: 98 % (01/04/24 1200) Vital Signs (24h Range):  Temp:  [97.9 °F (36.6 °C)-98.7 °F (37.1 °C)] 98.7 °F (37.1 °C)  Pulse:  [79-81] 79  Resp:  [13-30] 18  SpO2:  [93 %-100  %] 98 %  BP: (122-161)/() 153/73     Weight: 80.7 kg (178 lb) (12/29/23 0703)  Body mass index is 24.83 kg/m².  Body surface area is 2.01 meters squared.    I/O last 3 completed shifts:  In: 1159.8 [P.O.:920; I.V.:239.8]  Out: 1046 [Urine:830; Other:216]     Physical Exam  Vitals and nursing note reviewed.   Constitutional:       General: He is not in acute distress.     Appearance: He is ill-appearing. He is not toxic-appearing.      Interventions: Nasal cannula in place.   HENT:      Head: Normocephalic and atraumatic.      Nose: Nose normal.      Mouth/Throat:      Mouth: Mucous membranes are dry.      Pharynx: No oropharyngeal exudate.   Eyes:      General: No scleral icterus.        Right eye: No discharge.         Left eye: No discharge.   Cardiovascular:      Rate and Rhythm: Normal rate.   Pulmonary:      Effort: Pulmonary effort is normal. No respiratory distress.      Breath sounds: No wheezing or rales.   Abdominal:      General: Abdomen is flat.   Musculoskeletal:      Cervical back: Normal range of motion. No rigidity.      Right lower leg: No edema.      Left lower leg: Edema present.      Comments: Bilateral heels wrapped in ACE bandages.    Lymphadenopathy:      Cervical: No cervical adenopathy.   Neurological:      Mental Status: He is alert and easily aroused.      Comments: Alert, slow to respond   Psychiatric:         Behavior: Behavior is uncooperative.          Significant Labs:  CBC:   Recent Labs   Lab 01/04/24 0414   WBC 7.42   RBC 3.20*   HGB 8.0*   HCT 26.2*      MCV 82   MCH 25.0*   MCHC 30.5*     CMP:   Recent Labs   Lab 01/04/24 0414   GLU 80   CALCIUM 8.3*   ALBUMIN 1.9*   PROT 6.2      K 3.4*   CO2 24      BUN 49*   CREATININE 4.2*   ALKPHOS 89   ALT 34   AST 29   BILITOT 0.5     All labs within the past 24 hours have been reviewed.

## 2024-01-04 NOTE — SUBJECTIVE & OBJECTIVE
Interval History: No major issues overnight. Mentation much improved this morning. States he's feeling better, would like to have the procedure done    Review of Systems   Constitutional: Negative for chills and fever.   HENT: Negative.     Eyes:  Negative for blurred vision and pain.   Cardiovascular:  Negative for chest pain and leg swelling.   Respiratory:  Negative for cough, shortness of breath and wheezing.    Skin:  Positive for dry skin and poor wound healing. Negative for rash.   Musculoskeletal:  Negative for arthritis.   Gastrointestinal:  Negative for abdominal pain, nausea and vomiting.   Genitourinary:  Negative for dysuria, frequency and hematuria.   Neurological:  Negative for dizziness, headaches and light-headedness.   Psychiatric/Behavioral:  Negative for altered mental status. The patient is not nervous/anxious.      Objective:     Vital Signs (Most Recent):  Temp: 98.3 °F (36.8 °C) (01/04/24 0701)  Pulse: 79 (01/04/24 1000)  Resp: 16 (01/04/24 1000)  BP: 136/65 (01/04/24 1000)  SpO2: 100 % (01/04/24 1000) Vital Signs (24h Range):  Temp:  [97.9 °F (36.6 °C)-98.3 °F (36.8 °C)] 98.3 °F (36.8 °C)  Pulse:  [79-81] 79  Resp:  [13-30] 16  SpO2:  [93 %-100 %] 100 %  BP: (122-161)/() 136/65     Weight: 80.7 kg (178 lb)  Body mass index is 24.83 kg/m².     SpO2: 100 %         Intake/Output Summary (Last 24 hours) at 1/4/2024 1139  Last data filed at 1/4/2024 1000  Gross per 24 hour   Intake 1269.96 ml   Output 450 ml   Net 819.96 ml         Lines/Drains/Airways       Peripherally Inserted Central Catheter Line  Duration             PICC Double Lumen 12/07/23 1457 right brachial 27 days              Central Venous Catheter Line  Duration             Trialysis (Dialysis) Catheter 12/29/23 2117 right internal jugular 5 days     Introducer Single Lumen 01/01/24 0927 Internal Jugular Left 3 days              Drain  Duration                  Urethral Catheter 12/28/23 1600 Double-lumen 16 Fr. 6 days               Line  Duration                  Pacer Wires 01/01/24 0928 3 days                       Physical Exam  Vitals and nursing note reviewed.   Constitutional:       General: He is not in acute distress.     Appearance: He is ill-appearing.   HENT:      Mouth/Throat:      Mouth: Mucous membranes are dry.   Eyes:      Extraocular Movements: Extraocular movements intact.      Pupils: Pupils are equal, round, and reactive to light.   Cardiovascular:      Rate and Rhythm: Normal rate and regular rhythm.      Pulses: Normal pulses.      Heart sounds: Murmur (ejection systolic murmur) heard.   Pulmonary:      Effort: Pulmonary effort is normal.      Breath sounds: Normal breath sounds. No wheezing, rhonchi or rales.   Abdominal:      General: Bowel sounds are normal. There is no distension.      Palpations: Abdomen is soft.      Tenderness: There is no abdominal tenderness.   Musculoskeletal:         General: No tenderness.      Right lower leg: Edema (1+ pitting edema 1/3 way up shin) present.      Left lower leg: Edema (1+ pitting edema 1/3 way up shin) present.   Skin:     General: Skin is warm and dry.      Capillary Refill: Capillary refill takes less than 2 seconds.      Findings: No erythema or rash.   Neurological:      Mental Status: He is lethargic, disoriented and confused.            Significant Labs: CMP   Recent Labs   Lab 01/03/24  0316 01/04/24  0414    139   K 3.4* 3.4*    105   CO2 23 24   GLU 83 80   BUN 88* 49*   CREATININE 6.0* 4.2*   CALCIUM 8.8 8.3*   PROT 6.4 6.2   ALBUMIN 1.8* 1.9*   BILITOT 0.4 0.5   ALKPHOS 90 89   AST 31 29   ALT 41 34   ANIONGAP 14 10     , CBC   Recent Labs   Lab 01/03/24  0316 01/04/24  0414   WBC 8.22 7.42   HGB 8.5* 8.0*   HCT 27.1* 26.2*    255     , All pertinent lab results from the last 24 hours have been reviewed., and   Recent Lab Results  (Last 5 results in the past 24 hours)        01/04/24  1130   01/04/24  0912   01/04/24  0830    01/04/24  0414   01/03/24  2125        Albumin       1.9         ALP       89         ALT       34         Anion Gap       10         AST       29         Baso #       0.05         Basophil %       0.7         BILIRUBIN TOTAL       0.5  Comment: For infants and newborns, interpretation of results should be based  on gestational age, weight and in agreement with clinical  observations.    Premature Infant recommended reference ranges:  Up to 24 hours.............<8.0 mg/dL  Up to 48 hours............<12.0 mg/dL  3-5 days..................<15.0 mg/dL  6-29 days.................<15.0 mg/dL           BUN       49         Calcium       8.3         Chloride       105         CO2       24         CPK   155             Creatinine       4.2         Differential Method       Automated         eGFR       14.2         Eos #       0.3         Eosinophil %       3.6         Glucose       80         Gran # (ANC)       5.2         Gran %       70.1         Hematocrit       26.2         Hemoglobin       8.0         Immature Grans (Abs)       0.04  Comment: Mild elevation in immature granulocytes is non specific and   can be seen in a variety of conditions including stress response,   acute inflammation, trauma and pregnancy. Correlation with other   laboratory and clinical findings is essential.           Immature Granulocytes       0.5         Lymph #       0.8         Lymph %       11.2         Magnesium        2.7         MCH       25.0         MCHC       30.5         MCV       82         Mono #       1.0         Mono %       13.9         MPV       9.4         nRBC       0         Phosphorus Level       3.0         Platelet Count       255         POCT Glucose 108     92     123       Potassium       3.4         PROTEIN TOTAL       6.2         RBC       3.20         RDW       18.0         Sodium       139         WBC       7.42                              Significant Imaging:  All imaging from the past 24 hours has been  reviewed

## 2024-01-04 NOTE — SUBJECTIVE & OBJECTIVE
Interval History: Chart reviewed including 24h medication use. Patient continues to be awake and conversant near cognitive baseline. Remains frustrated with delays in pacemaker placement. EP considering timing, possibly tomorrow. UOP remains adequate      Medications:  Continuous Infusions:   sodium chloride 0.9% 10 mL/hr at 01/04/24 1400     Scheduled Meds:   sodium chloride 0.9%   Intravenous Once    acetaminophen  1,000 mg Oral Q8H    allopurinoL  50 mg Oral Daily    atorvastatin  80 mg Oral Daily    DAPTOmycin (CUBICIN) IV (PEDS and ADULTS)  8 mg/kg Intravenous Q48H    ertapenem (INVANZ) IVPB  500 mg Intravenous Q24H    famotidine  20 mg Oral Daily    folic acid  1 mg Oral Daily    heparin (porcine)  5,000 Units Subcutaneous Q8H    LIDOcaine  1 patch Transdermal Q24H    mupirocin   Nasal BID     PRN Meds:0.9%  NaCl infusion (for blood administration), sodium chloride 0.9%, benzonatate, dextrose 10%, dextrose 10%, glucagon (human recombinant), glucose, glucose, heparin (porcine), HYDROmorphone, insulin aspart U-100, magnesium oxide, methocarbamoL, naloxone, oxyCODONE, senna-docusate 8.6-50 mg, sodium chloride 0.9%, sodium chloride 0.9%    Objective:     Vital Signs (Most Recent):  Temp: 98.7 °F (37.1 °C) (01/04/24 1101)  Pulse: 79 (01/04/24 1400)  Resp: 20 (01/04/24 1400)  BP: (!) 155/73 (01/04/24 1400)  SpO2: 98 % (01/04/24 1400) Vital Signs (24h Range):  Temp:  [97.9 °F (36.6 °C)-98.7 °F (37.1 °C)] 98.7 °F (37.1 °C)  Pulse:  [79-81] 79  Resp:  [13-30] 20  SpO2:  [93 %-100 %] 98 %  BP: (122-169)/() 155/73     Weight: 80.7 kg (178 lb)  Body mass index is 24.83 kg/m².       Physical Exam  Vitals and nursing note reviewed.   Constitutional:       General: He is not in acute distress.     Appearance: He is ill-appearing. He is not toxic-appearing.   HENT:      Mouth/Throat:      Mouth: Mucous membranes are moist.   Eyes:      General: No scleral icterus.     Extraocular Movements: Extraocular movements intact.  "  Neck:      Comments: TVP left IJ, trialysis right IJ  Pulmonary:      Effort: Pulmonary effort is normal. No respiratory distress.   Abdominal:      General: Abdomen is flat.      Palpations: Abdomen is soft.   Skin:     General: Skin is warm and dry.   Neurological:      Mental Status: He is alert.      Comments: Incoherent at times but answers some complex questions, oriented to location and situation                Advance Care Planning   Advance Directives:   Medical Power of : Yes      Decision Making:  Patient answered questions and Family answered questions  Goals of Care: Continued goals of care discussion with patient and his MPOALeigh over the phone. Reviewed expectations and functional/clinical decline over the past several months. Also discussed pros/cons of exploring pacemaker placement. Leigh very clear that while she agrees patient is nearing the final stage of his life and is open to further goals of care and care coordination discussions (eg hospice), she agrees that patient is likely not ready to die in the immediate future and so are hoping for pacemaker placement. She also is clear that patient would not want to undergo CPR or be place on a ventilator if he were dying but she is willing to make him "Full Code" for the procedure if the EP team is otherwise unwilling to move forward.       CBC:   Recent Labs   Lab 01/04/24 0414   WBC 7.42   HGB 8.0*   HCT 26.2*   MCV 82        BMP:  Recent Labs   Lab 01/04/24 0414   GLU 80      K 3.4*      CO2 24   BUN 49*   CREATININE 4.2*   CALCIUM 8.3*   MG 2.7*     LFT:  Lab Results   Component Value Date    AST 29 01/04/2024    ALKPHOS 89 01/04/2024    BILITOT 0.5 01/04/2024     Albumin:   Albumin   Date Value Ref Range Status   01/04/2024 1.9 (L) 3.5 - 5.2 g/dL Final     Protein:   Total Protein   Date Value Ref Range Status   01/04/2024 6.2 6.0 - 8.4 g/dL Final     Lactic acid:   Lab Results   Component Value Date    LACTATE " 0.8 12/30/2023    LACTATE 0.8 11/27/2023       Reviewed CBC with stable blood counts, CMP with stable renal function

## 2024-01-04 NOTE — PLAN OF CARE
"POC reviewed with Chato Bowie and family. Questions and concerns addressed. Patient drowsy in the morning and became more aware and oriented throughout the shift. Patient completed HD. No acute events. See below and flowsheets for full assessment and VS info.       Neuro:  Draper Coma Scale  Best Eye Response: 4-->(E4) spontaneous  Best Motor Response: 6-->(M6) obeys commands  Best Verbal Response: 4-->(V4) confused  Draper Coma Scale Score: 14  Assessment Qualifiers: patient not sedated/intubated  Pupil PERRLA: yes    24 hr Temp:  [97.6 °F (36.4 °C)-98.5 °F (36.9 °C)]     CV:  Rhythm: paced rhythm   DVT prophylaxis: VTE Required Core Measure: Pharmacological prophylaxis initiated/maintained                            Pulses  Right Radial Pulse: 2+ (normal), palpation  Left Radial Pulse: 2+ (normal), palpation  Right Dorsalis Pedis Pulse: 1+ (weak), palpation  Left Dorsalis Pedis Pulse: 1+ (weak), palpation  Right Posterior Tibial Pulse: 1+ (weak), palpation  Left Posterior Tibial Pulse: 1+ (weak), palpation    Resp:  Flow (L/min): 1       GI/:  GI prophylaxis: yes  Diet/Nutrition Received: mechanical/dental soft  Last Bowel Movement: 01/02/24  Voiding Characteristics: urethral catheter (bladder)       Urethral Catheter 12/28/23 1600 Double-lumen 16 Fr.-Reason for Continuing Urinary Catheterization: Critically ill in ICU and requiring hourly monitoring of intake/output   Intake/Output Summary (Last 24 hours) at 1/3/2024 1814  Last data filed at 1/3/2024 1800  Gross per 24 hour   Intake 479.8 ml   Output 596 ml   Net -116.2 ml          Labs/Accuchecks:  Recent Labs   Lab 01/01/24  0630 01/02/24  0125 01/03/24  0316   WBC 7.97 9.68 8.22   RBC 3.27* 3.30* 3.33*   HGB 8.2* 8.1* 8.5*   HCT 25.7* 26.8* 27.1*    292 294    No results for input(s): "PT", "INR", "APTT" in the last 168 hours.   Recent Labs     01/03/24  0316      K 3.4*   CO2 23      BUN 88*   CREATININE 6.0*   ALKPHOS 90   ALT 41 " "  AST 31   BILITOT 0.4       Recent Labs   Lab 12/28/23  2324 01/01/24  0630   *  --    TROPONINI  --  0.384*    No results for input(s): "PH", "PCO2", "PO2", "HCO3", "POCSATURATED", "BE" in the last 72 hours.    Electrolytes: No replacement orders  Accuchecks: ACHS    Gtts/LDAs:   sodium chloride 0.9% 10 mL/hr at 01/03/24 1800       Lines/Drains/Airways       Peripherally Inserted Central Catheter Line  Duration             PICC Double Lumen 12/07/23 1457 right brachial 27 days              Central Venous Catheter Line  Duration             Trialysis (Dialysis) Catheter 12/29/23 2117 right internal jugular 4 days     Introducer Single Lumen 01/01/24 0927 Internal Jugular Left 2 days              Drain  Duration                  Urethral Catheter 12/28/23 1600 Double-lumen 16 Fr. 6 days              Line  Duration                  Pacer Wires 01/01/24 0928 2 days                    Skin/Wounds  Bathing/Skin Care: other (see comments) (completed by another provider) (01/03/24 1501)  Wounds: Yes  Wound care consulted: Yes      "

## 2024-01-04 NOTE — PROGRESS NOTES
1610 - Patient pre-medicated for wound with dilaudid and agreeable for wound care.     1620 - Patient refusing routine wound care and bath. Charge nurse notified.

## 2024-01-04 NOTE — HPI
Chato Bowie is a 73 year old male with hx of T2DM, HTN, HLD, CHF, CAD, HLD, CKD 3b, alcohol use disorder, and multiple thyroid nodules presented from [facility] with RLQ pain, fatigue, bradycardia ~30's, weakness, and lab abnormalities including Cr 7.1 and . 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  On interview patient lethargic and disoriented, history obtained via chart review.     In ED pt AF, bradycardic ~40's, RR in the mid 20's, BP ~110/50, satting 100% on 2L NC. Labs significant for Hb 7.2, ANC 8.3, Na 132, Cl 87, HCO3 23, AG 22, , Cr 7.5, phos 8.3, Albumin 1.9, AST 74, , Lipase 146. UA with 2+ protein, 2+ occult blood, 3+ leukocytes, >100WBC and many bacteria. EKG showed wide QRS with LBBB, vent rate 45, QT/QTc 652/563. CXR showed mild retraction of R-sided PICC into mid SVC. Admitting to [facility] for further management. Skin integrity CHAD consulted for evaluation of skin injury.

## 2024-01-04 NOTE — SUBJECTIVE & OBJECTIVE
Interval History: Had an episode of unresponsiveness on 1/1 and TdP which resolved after 10-20 seconds of chest compressions. After that he was sinus with complete heart block. TVP was placed and he has been paced at a rate of 80. Underlying rhythm still sinus with complete heart block.    Waxing and waning mentation. Yesterday morning as well as today so far he was very clearly able to have full conversations with good mentation. Yesterday palliative care discussed with him and his niece and he would prefer to allow cardioversion but no chest compressions. If absolutely necessary for the procedure to happen, he would be okay with reversing his code status to full code but in a catastrophic event where is intubated with poor prognosis, would likely be palliatively extubated afterward.      Review of Systems   Constitutional: Negative for diaphoresis and fever.   Cardiovascular:  Negative for chest pain, dyspnea on exertion, leg swelling, near-syncope, orthopnea, palpitations, paroxysmal nocturnal dyspnea and syncope.   Respiratory:  Negative for cough and shortness of breath.    Gastrointestinal:  Negative for abdominal pain, diarrhea, nausea and vomiting.   Neurological:  Negative for light-headedness.   Psychiatric/Behavioral:  Negative for altered mental status and substance abuse.      Objective:     Vital Signs (Most Recent):  Temp: 98.3 °F (36.8 °C) (01/04/24 0701)  Pulse: 79 (01/04/24 1000)  Resp: 16 (01/04/24 1000)  BP: 136/65 (01/04/24 1000)  SpO2: 100 % (01/04/24 1000) Vital Signs (24h Range):  Temp:  [97.9 °F (36.6 °C)-98.5 °F (36.9 °C)] 98.3 °F (36.8 °C)  Pulse:  [79-81] 79  Resp:  [13-30] 16  SpO2:  [93 %-100 %] 100 %  BP: (122-161)/() 136/65     Weight: 80.7 kg (178 lb)  Body mass index is 24.83 kg/m².     SpO2: 100 %    Telemetry : Complete heart block, HR 49. In the AM patient had an episode of TdP and then sinus tachy with CHB.     Physical Exam  Constitutional:       General: He is not in  acute distress.     Appearance: He is ill-appearing.   HENT:      Mouth/Throat:      Mouth: Mucous membranes are dry.   Cardiovascular:      Rate and Rhythm: Normal rate.      Heart sounds: Murmur heard.      Comments: TVP-paced at 80  Pulmonary:      Effort: Pulmonary effort is normal.      Breath sounds: Normal breath sounds.   Musculoskeletal:      Right lower leg: No edema.      Left lower leg: No edema.   Skin:     General: Skin is warm.   Neurological:      Mental Status: He is disoriented.          Significant Labs:     Recent Labs   Lab 24  [id number] 24  0414   WBC 9.68 8.22 7.42   HGB 8.1* 8.5* 8.0*   HCT 26.8* 27.1* 26.2*    294 255         Recent Labs   Lab 24  01224  0414    137 139   K 3.7 3.4* 3.4*    100 105   CO2 21* 23 24   BUN 88* 88* 49*   CREATININE 6.4* 6.0* 4.2*   CALCIUM 9.2 8.8 8.3*   PHOS 5.0* 4.9* 3.0         Recent Labs   Lab 246 24  0414   ALKPHOS 91 90 89   BILITOT 0.5 0.4 0.5   PROT 6.6 6.4 6.2   ALT 55* 41 34   AST 40 31 29             Recent Labs   Lab 23  2324 24  0630 24  0912   *  --  155   TROPONINI  --  0.384*  --        Significant Imagin/29/23    Left Ventricle: The left ventricle is normal in size. Ventricular mass is normal. Normal wall thickness. There is concentric remodeling. Septal motion is consistent with bundle branch block. There is normal systolic function with a visually estimated ejection fraction of 55 - 60%. Grade II diastolic dysfunction.    Right Ventricle: Normal right ventricular cavity size. Systolic function is normal.    Left Atrium: Left atrium is severely dilated.    Aortic Valve: There is moderate aortic valve sclerosis. Moderately restricted motion. There is severe stenosis. Aortic valve area by VTI is 0.86 cm². Aortic valve peak velocity is 4.12 m/s. Mean gradient is 43 mmHg. The dimensionless index is 0.22. There is  mild to moderate aortic regurgitation.    Mitral Valve: There is bileaflet sclerosis. There is mild annular dilation present.    Tricuspid Valve: There is mild to moderate regurgitation.    Pulmonary Artery: The estimated pulmonary artery systolic pressure is 72 mmHg.    IVC/SVC: Intermediate venous pressure at 8 mmHg.

## 2024-01-04 NOTE — ASSESSMENT & PLAN NOTE
Patient with complete heart block, previous EKG with first degree AV block and LBBB  Patient on metoprolol and amiodarone as an outpatient  HR in the 30s on admission, had improved to ~ 45   EP consulted, thought it could be 2/2 uremia/ELIZABETH/infection; held off on TVP initially  Stepped down to  with plans to see how he improved with dialysis  However went into torsades on 12/31  TVP placed by IC; EP planning to do PPM insertion likely 1/5

## 2024-01-04 NOTE — PROGRESS NOTES
[facility]  Cardiology  Progress Note    Patient Name: Chato Bowie  MRN: [id number]  Admission Date: 12/28/2023  Hospital Length of Stay: 7 days  Code Status: DNR   Attending Physician: Stephen Werner MD   Primary Care Physician: Irlanda Valenzuela -  Expected Discharge Date: 1/9/2024  Principal Problem:Complete heart block    Subjective:     Hospital Course:   Admitted for CHB which EP thought to be 2/2 uremia/ELIZABETH and infection. Has not required pacing and is HDS. Recommended holding AV iker agents. BUN and sCr newly elevated, seen by nephrology who started dialysis for clearance. Tolerated dialysis with plans to further dialyze. UCx growing GNR, on cefepime. Continued daptomycin for his BL heel OM. EP holding on PPM insertion for now, would like to see if he improves with dialysis; likely a better candidate for leadless pacemaker given his multiple infections. Consider ID consult for his multidrug resistant organisms and possible pyelonephritis to ensure good source control. Mildly delirious which could be 2/2 his uremia and infection, further assessing with CT head which was unremarkable. Stepped down to  but came back to CCU after he went into torsades on 12/31. CPR was initiated and Pt regained pulse and consciousness; bradycardic post event with HR ~50. Electrolytes replaced. TVP placed in CCU; spoke with SID who confirmed a DNR status but that she would want a pacemaker placed. EP consulted for PPM insertion. Had another episode of torsades (~ 2 mins) that became CHB and then was paced to NSR. TVP placed. EP planning for micra insertion once on abx for his UTI for 72 hours. Code status will need to be reversed to full for the procedure, attempting to contact jero to further discuss. Palliative care consulted for GOC/ACP. Pt more alerted and oriented, states he would like to proceed with the procedure, Micra pending.     Interval History: No major issues overnight. Mentation  much improved this morning. States he's feeling better, would like to have the procedure done    Review of Systems   Constitutional: Negative for chills and fever.   HENT: Negative.     Eyes:  Negative for blurred vision and pain.   Cardiovascular:  Negative for chest pain and leg swelling.   Respiratory:  Negative for cough, shortness of breath and wheezing.    Skin:  Positive for dry skin and poor wound healing. Negative for rash.   Musculoskeletal:  Negative for arthritis.   Gastrointestinal:  Negative for abdominal pain, nausea and vomiting.   Genitourinary:  Negative for dysuria, frequency and hematuria.   Neurological:  Negative for dizziness, headaches and light-headedness.   Psychiatric/Behavioral:  Negative for altered mental status. The patient is not nervous/anxious.      Objective:     Vital Signs (Most Recent):  Temp: 98.3 °F (36.8 °C) (01/04/24 0701)  Pulse: 79 (01/04/24 1000)  Resp: 16 (01/04/24 1000)  BP: 136/65 (01/04/24 1000)  SpO2: 100 % (01/04/24 1000) Vital Signs (24h Range):  Temp:  [97.9 °F (36.6 °C)-98.3 °F (36.8 °C)] 98.3 °F (36.8 °C)  Pulse:  [79-81] 79  Resp:  [13-30] 16  SpO2:  [93 %-100 %] 100 %  BP: (122-161)/() 136/65     Weight: 80.7 kg (178 lb)  Body mass index is 24.83 kg/m².     SpO2: 100 %         Intake/Output Summary (Last 24 hours) at 1/4/2024 1139  Last data filed at 1/4/2024 1000  Gross per 24 hour   Intake 1269.96 ml   Output 450 ml   Net 819.96 ml         Lines/Drains/Airways       Peripherally Inserted Central Catheter Line  Duration             PICC Double Lumen 12/07/23 1457 right brachial 27 days              Central Venous Catheter Line  Duration             Trialysis (Dialysis) Catheter 12/29/23 2117 right internal jugular 5 days     Introducer Single Lumen 01/01/24 0927 Internal Jugular Left 3 days              Drain  Duration                  Urethral Catheter 12/28/23 1600 Double-lumen 16 Fr. 6 days              Line  Duration                  Pacer Wires  01/01/24 0928 3 days                       Physical Exam  Vitals and nursing note reviewed.   Constitutional:       General: He is not in acute distress.     Appearance: He is ill-appearing.   HENT:      Mouth/Throat:      Mouth: Mucous membranes are dry.   Eyes:      Extraocular Movements: Extraocular movements intact.      Pupils: Pupils are equal, round, and reactive to light.   Cardiovascular:      Rate and Rhythm: Normal rate and regular rhythm.      Pulses: Normal pulses.      Heart sounds: Murmur (ejection systolic murmur) heard.   Pulmonary:      Effort: Pulmonary effort is normal.      Breath sounds: Normal breath sounds. No wheezing, rhonchi or rales.   Abdominal:      General: Bowel sounds are normal. There is no distension.      Palpations: Abdomen is soft.      Tenderness: There is no abdominal tenderness.   Musculoskeletal:         General: No tenderness.      Right lower leg: Edema (1+ pitting edema 1/3 way up shin) present.      Left lower leg: Edema (1+ pitting edema 1/3 way up shin) present.   Skin:     General: Skin is warm and dry.      Capillary Refill: Capillary refill takes less than 2 seconds.      Findings: No erythema or rash.   Neurological:      Mental Status: He is lethargic, disoriented and confused.            Significant Labs: CMP   Recent Labs   Lab 01/03/24  0316 01/04/24 0414    139   K 3.4* 3.4*    105   CO2 23 24   GLU 83 80   BUN 88* 49*   CREATININE 6.0* 4.2*   CALCIUM 8.8 8.3*   PROT 6.4 6.2   ALBUMIN 1.8* 1.9*   BILITOT 0.4 0.5   ALKPHOS 90 89   AST 31 29   ALT 41 34   ANIONGAP 14 10     , CBC   Recent Labs   Lab 01/03/24  0316 01/04/24  0414   WBC 8.22 7.42   HGB 8.5* 8.0*   HCT 27.1* 26.2*    255     , All pertinent lab results from the last 24 hours have been reviewed., and   Recent Lab Results  (Last 5 results in the past 24 hours)        01/04/24  1130   01/04/24  0912   01/04/24  0830   01/04/24  0414   01/03/24  2125        Albumin       1.9          ALP       89         ALT       34         Anion Gap       10         AST       29         Baso #       0.05         Basophil %       0.7         BILIRUBIN TOTAL       0.5  Comment: For infants and newborns, interpretation of results should be based  on gestational age, weight and in agreement with clinical  observations.    Premature Infant recommended reference ranges:  Up to 24 hours.............<8.0 mg/dL  Up to 48 hours............<12.0 mg/dL  3-5 days..................<15.0 mg/dL  6-29 days.................<15.0 mg/dL           BUN       49         Calcium       8.3         Chloride       105         CO2       24         CPK   155             Creatinine       4.2         Differential Method       Automated         eGFR       14.2         Eos #       0.3         Eosinophil %       3.6         Glucose       80         Gran # (ANC)       5.2         Gran %       70.1         Hematocrit       26.2         Hemoglobin       8.0         Immature Grans (Abs)       0.04  Comment: Mild elevation in immature granulocytes is non specific and   can be seen in a variety of conditions including stress response,   acute inflammation, trauma and pregnancy. Correlation with other   laboratory and clinical findings is essential.           Immature Granulocytes       0.5         Lymph #       0.8         Lymph %       11.2         Magnesium        2.7         MCH       25.0         MCHC       30.5         MCV       82         Mono #       1.0         Mono %       13.9         MPV       9.4         nRBC       0         Phosphorus Level       3.0         Platelet Count       255         POCT Glucose 108     92     123       Potassium       3.4         PROTEIN TOTAL       6.2         RBC       3.20         RDW       18.0         Sodium       139         WBC       7.42                              Significant Imaging:  All imaging from the past 24 hours has been reviewed  Assessment and Plan:     * Complete heart block  Patient with  complete heart block, previous EKG with first degree AV block and LBBB  Patient on metoprolol and amiodarone as an outpatient  HR in the 30s on admission, had improved to ~ 45   EP consulted, thought it could be 2/2 uremia/ELIZABETH/infection; held off on TVP initially  Stepped down to  with plans to see how he improved with dialysis  However went into torsades on 12/31  TVP placed by IC; EP planning to do PPM insertion likely 1/5    Torsades de pointes  Went into torsades overnight 12/31; HR went to 160-170, then Pt lost consciousness and pulse. CPR initiated, Pt regained consciousness and pulse then was transferred back to CCU  Received Mg and K although levels were not critical  Medications reviewed - no obvious meds that could be prolonging QTc  TVP placed by IC  Spoke with SID who confirmed DNR code status but she is happy for a PPM to be inserted  Had another episode of torsades on 1/1 for about 2 mins which then converted to CHB which he was paced out of into a HR ~ 65; spoke with EP who advised to overpace him  Remain on telemetry  Keep K > 4, Mg > 2    Osteomyelitis of foot  Admitted on 11/27 from NH for b/l wounds. 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 Wound swab of R ulcer with MRSA.   s/p bone biopsy and debridement by podiatry   On 6 weeks of IV abx Dapto with end date end of 1/10/2024    Advanced care planning/counseling discussion  Cardiology team spoke with SID over the phone who confirmed DNR status however advised that she would like a PPM inserted    Acute encephalopathy  Likely uremic encephalopathy in the setting of ARF with  and asterixis on exam. Infections (UTI and OM) could also be contributing  Remains disoriented but alertness improved, answers some questions appropriately. Oriented to place and month.   Currently able to protect airway but will continue to monitor  CT head unremarkable  Continue ertapenem for UTI  Continue daptomycin for OM  SLP advised for minced/moist  diet    Bradycardia  See CHB    UTI (urinary tract infection)  Suspect this patient has Complicated cystitis given retention and positive UA  UCx growing multi drug resistant E coli  ID following, started on ertapenem, anticipate 7 day course, last day 1/6    Paroxysmal atrial flutter  On eliquis at home. Unknown last dose  - Will hold off restating due to likely PPM insertion    Severe aortic valve stenosis  TTE:  Left Ventricle: The left ventricle is normal in size. Ventricular mass is normal. Normal wall thickness. There is concentric remodeling. Septal motion is consistent with bundle branch block. There is normal systolic function with a visually estimated ejection fraction of 55 - 60%. Grade II diastolic dysfunction.    Right Ventricle: Normal right ventricular cavity size. Systolic function is normal.    Left Atrium: Left atrium is severely dilated.    Aortic Valve: There is moderate aortic valve sclerosis. Moderately restricted motion. There is severe stenosis. Aortic valve area by VTI is 0.86 cm². Aortic valve peak velocity is 4.12 m/s. Mean gradient is 43 mmHg. The dimensionless index is 0.22. There is mild to moderate aortic regurgitation.    Mitral Valve: There is bileaflet sclerosis. There is mild annular dilation present.    Tricuspid Valve: There is mild to moderate regurgitation.    Pulmonary Artery: The estimated pulmonary artery systolic pressure is 72 mmHg.    IVC/SVC: Intermediate venous pressure at 8 mmHg.    Acute renal failure superimposed on stage 3b chronic kidney disease  Admitted with ELIZABETH on CKD  Initial  with sCr 7.5; improving with dialysis  Nephrology consulted, started on HD for clearance  Still making some urine  Dialysis per nephro    GERD (gastroesophageal reflux disease)  Continue famotidine    Anemia  Likely multifactorial given CKD and infection. No signs of bleeding.  Has not received any PRBCs to date. Workup pending. Denies hematemesis, melena, hematochezia, other overt  signs of bleeding     Current CBC reviewed-         Lab Results   Component Value Date     HGB 7.2 (L) 12/28/2023     HCT 22.9 (L) 12/28/2023      - Will obtain cnosent and transfuse if Hb < 7    Type 2 diabetes mellitus with kidney complication, with long-term current use of insulin  Last HbA1c 5.5  POCT TIDWM and prior to bed  LDSSI  Monitor BGLs, start long acting insulin if needed    Dyslipidemia  Continue statin    Epigastric abdominal pain  CTAP showing cholelithiasis, no evidence of acute cholecystitis, anasarca      VTE Risk Mitigation (From admission, onward)           Ordered     heparin (porcine) injection 1,000 Units  As needed (PRN)         12/30/23 1056     heparin (porcine) injection 5,000 Units  Every 8 hours         12/29/23 1321     IP VTE HIGH RISK PATIENT  Once         12/28/23 1719     Place sequential compression device  Until discontinued         12/28/23 1719                  Marianna Cullen MD  Cardiology  [facility] - [facility]

## 2024-01-04 NOTE — ASSESSMENT & PLAN NOTE
73m with HTN, DM2, ELIZABETH on CKD who presented [facility] home in [facility], s/p TVP placement    -recommend proceeding with Micra placement despite risks and overall limited life expectancy given expressed values as alternative would be acute end of life care

## 2024-01-04 NOTE — PT/OT/SLP PROGRESS
"Speech Language Pathology Treatment    Patient Name:  Chato Bowie   MRN:  [id number]  Admitting Diagnosis: Complete heart block    Recommendations:                 General Recommendations:   monitor diet tolerance  Diet recommendations:  Soft & Bite Sized Diet - IDDSI Level 6, Liquid Diet Level: Thin liquids - IDDSI Level 0   Aspiration Precautions: 1 bite/sip at a time, Alternating bites/sips, Avoid talking while eating, HOB to 90 degrees, Meds whole 1 at a time, Monitor for s/s of aspiration, Small bites/sips, and Standard aspiration precautions   General Precautions: Standard, aspiration, contact, fall  Communication strategies:  go to room if call light pushed    Assessment:     Chato Bowie is a 73 y.o. male with an SLP diagnosis of  improving swallowing abilities (may be approaching baseline  .    Subjective     "When are they gonna feed me breakfast?" Pt did not recall that he has had both breakfast and lunch meals today.     Pain/Comfort:  Pain Rating 1:  (did not rate)  Location 1: back  Pain Addressed 1: Reposition, Nurse notified    Respiratory Status: Room air    Objective:     Has the patient been evaluated by SLP for swallowing?   Yes  Keep patient NPO? No   Current Respiratory Status:        Pt seen for follow up to monitor diet tolerance and assess for readiness for further diet advancement.  RN reporting that pt is tolerating minced and moist diet without difficulty and swallowing meds whole with thin liquids.  Pt was agreeable to accepting PO trials, but was unable to tolerate HOB being elevated above 45 degrees due to pain/discomfort in back.  Pt was observed consuming nearly 2 antonio crackers in small portions and 2 straw sips of water.  Dry throat clear present after 1 after bite of cracker.  Given pt's limited dentition, SLP recommends pt remain on a soft solid diet, but is appropriate to advance up one level (soft and bite size, IDDSI level 6).  Education was provided to pt regarding role of " SLP, reducing risks of aspiration, diet recommendations, aspiration precautions, and SLP treatment plan and POC.  Pt demonstrated understanding of education provided, but will benefit from continued reinforcement.       Goals:   Multidisciplinary Problems       SLP Goals          Problem: SLP    Goal Priority Disciplines Outcome   SLP Goal     SLP    Description: Speech Language Pathology Goals  Goals expected to be met by 1/9:   1. Pt will tolerate the least restrictive diet without s/s of aspiration.                                Plan:     Patient to be seen:  3 x/week   Plan of Care expires:  02/01/24  Plan of Care reviewed with:  patient   SLP Follow-Up:  Yes       Discharge recommendations:   (tbd)     Time Tracking:     SLP Treatment Date:   01/04/24  Speech Start Time:  1408  Speech Stop Time:  1419     Speech Total Time (min):  11 min    Billable Minutes: Treatment Swallowing Dysfunction 11    01/04/2024

## 2024-01-04 NOTE — PROGRESS NOTES
[facility]  Palliative Medicine  Progress Note    Patient Name: Chato Bowie  MRN: [id number]  Admission Date: 12/28/2023  Hospital Length of Stay: 7 days  Code Status: DNR   Attending Provider: Stephen Werner MD  Consulting Provider: Jose Duval MD  Primary Care Physician: Irlanda Valenzuela -  Principal Problem:Complete heart block    Patient information was obtained from patient, past medical records, and primary team.      Assessment/Plan:     Cardiac/Vascular  * Complete heart block  73m with HTN, DM2, ELIZABETH on CKD who presented to [facility] [place] [place] nursing home in [facility], s/p TVP placement    -recommend proceeding with Micra placement despite risks and overall limited life expectancy given expressed values as alternative would be acute end of life care    Palliative Care  Palliative care encounter  Insight/goals of care- fair, understands limited nature of his life expectancy, but is clear he is not ready to die in the immediate future (e.g. removing pacing). Suspect prognosis of weeks if discharged with pacer, would be likely hours to days without.     Social support- Gulshan is MPOA, patient in nursing home prior to admit, unclear what level of support at home     Spiritual- did not assess    Symptom management- pain related to chronic LE wounds, pain well-controlled with current meds    Recommendations  -DNR, full support  -Leigh Gaffney is MPOA, no secondary decision-makers (sister is living but with dementia)  -however, if consent already obtained, reasonable to proceed with Micra placement   -*code status should not necessarily be reversed for this procedure; patient's MPOA has agreed to cardioversion but not intubation or CPR; this is ethically sound, has been documented in ACP note on 1/1, and should be supported   -additionally have agreed to temporarily reverse code status for the procedure if DNR unacceptable per EP team, even though CPR and intubation in conflict with  patient's values; patient and niece understand risks of procedures which could include death and find them acceptable  -continue oxycodone 10mg q4hr PRN for pain, hydromorphone 0.5mg IV q6hr PRN for breakthrough/wound care        I will follow-up with patient. Please contact us if you have any additional questions.    Subjective:     Chief Complaint:   Chief Complaint   Patient presents with    Multiple complaints      Arrives via EMS with c/o elevated kidney fx, fatigue, and bradycardia -30s coming from [place]         HPI:   73 year old male with DM2, HTN, CAD, HFpEF, HLD, CKD3b, and hx of alcohol use disorder with recent admission for MRSA osteomyelitis of BL heel ulcers on IV dapto end date 1/10/24 who presented from [facility] with RLQ pain, fatigue, bradycardia ~30's, weakness, and lab abnormalities including Cr 7.1 and , also found to be in complete heart block, s/p TVP placement. Underwent HD x3, with improvement in mental status. EP consulted to consider pacemaker placement. Palliative care consulted to assist with goals of care.         Interval History: Chart reviewed including 24h medication use. Patient continues to be awake and conversant near cognitive baseline. Remains frustrated with delays in pacemaker placement. EP considering timing, possibly tomorrow. UOP remains adequate      Medications:  Continuous Infusions:   sodium chloride 0.9% 10 mL/hr at 01/04/24 1400     Scheduled Meds:   sodium chloride 0.9%   Intravenous Once    acetaminophen  1,000 mg Oral Q8H    allopurinoL  50 mg Oral Daily    atorvastatin  80 mg Oral Daily    DAPTOmycin (CUBICIN) IV (PEDS and ADULTS)  8 mg/kg Intravenous Q48H    ertapenem (INVANZ) IVPB  500 mg Intravenous Q24H    famotidine  20 mg Oral Daily    folic acid  1 mg Oral Daily    heparin (porcine)  5,000 Units Subcutaneous Q8H    LIDOcaine  1 patch Transdermal Q24H    mupirocin   Nasal BID     PRN Meds:0.9%  NaCl infusion (for blood  administration), sodium chloride 0.9%, benzonatate, dextrose 10%, dextrose 10%, glucagon (human recombinant), glucose, glucose, heparin (porcine), HYDROmorphone, insulin aspart U-100, magnesium oxide, methocarbamoL, naloxone, oxyCODONE, senna-docusate 8.6-50 mg, sodium chloride 0.9%, sodium chloride 0.9%    Objective:     Vital Signs (Most Recent):  Temp: 98.7 °F (37.1 °C) (01/04/24 1101)  Pulse: 79 (01/04/24 1400)  Resp: 20 (01/04/24 1400)  BP: (!) 155/73 (01/04/24 1400)  SpO2: 98 % (01/04/24 1400) Vital Signs (24h Range):  Temp:  [97.9 °F (36.6 °C)-98.7 °F (37.1 °C)] 98.7 °F (37.1 °C)  Pulse:  [79-81] 79  Resp:  [13-30] 20  SpO2:  [93 %-100 %] 98 %  BP: (122-169)/() 155/73     Weight: 80.7 kg (178 lb)  Body mass index is 24.83 kg/m².       Physical Exam  Vitals and nursing note reviewed.   Constitutional:       General: He is not in acute distress.     Appearance: He is ill-appearing. He is not toxic-appearing.   HENT:      Mouth/Throat:      Mouth: Mucous membranes are moist.   Eyes:      General: No scleral icterus.     Extraocular Movements: Extraocular movements intact.   Neck:      Comments: TVP left IJ, trialysis right IJ  Pulmonary:      Effort: Pulmonary effort is normal. No respiratory distress.   Abdominal:      General: Abdomen is flat.      Palpations: Abdomen is soft.   Skin:     General: Skin is warm and dry.   Neurological:      Mental Status: He is alert.      Comments: Incoherent at times but answers some complex questions, oriented to location and situation                Advance Care Planning  Advance Directives:   Medical Power of : Yes      Decision Making:  Patient answered questions and Family answered questions  Goals of Care: Continued goals of care discussion with patient and his MPOALeigh over the phone. Reviewed expectations and functional/clinical decline over the past several months. Also discussed pros/cons of exploring pacemaker placement. Leigh very clear that while  "she agrees patient is nearing the final stage of his life and is open to further goals of care and care coordination discussions (eg hospice), she agrees that patient is likely not ready to die in the immediate future and so are hoping for pacemaker placement. She also is clear that patient would not want to undergo CPR or be place on a ventilator if he were dying but she is willing to make him "Full Code" for the procedure if the EP team is otherwise unwilling to move forward.       CBC:   Recent Labs   Lab 01/04/24 0414   WBC 7.42   HGB 8.0*   HCT 26.2*   MCV 82        BMP:  Recent Labs   Lab 01/04/24 0414   GLU 80      K 3.4*      CO2 24   BUN 49*   CREATININE 4.2*   CALCIUM 8.3*   MG 2.7*     LFT:  Lab Results   Component Value Date    AST 29 01/04/2024    ALKPHOS 89 01/04/2024    BILITOT 0.5 01/04/2024     Albumin:   Albumin   Date Value Ref Range Status   01/04/2024 1.9 (L) 3.5 - 5.2 g/dL Final     Protein:   Total Protein   Date Value Ref Range Status   01/04/2024 6.2 6.0 - 8.4 g/dL Final     Lactic acid:   Lab Results   Component Value Date    LACTATE 0.8 12/30/2023    LACTATE 0.8 11/27/2023       Reviewed CBC with stable blood counts, CMP with stable renal function    In my care of this patient with acute on chronic severe illness with threat to life and/or bodily function, I am recommending goal-concordant care as noted above. I spent a significant amount of time reviewing external records/ recommendations of other providers, reviewing recent test results, and discussed care with other subspecialists involved    In addition to above, I spent 20 minutes specifically discussing advance care planning and goals of care with patient's family via telephone.     The above recommendations communicated directly to primary team on 1/4      Jose Duval MD  Palliative Medicine  [facility] - Cardiac Intensive Care                "

## 2024-01-04 NOTE — SUBJECTIVE & OBJECTIVE
Scheduled Meds:   sodium chloride 0.9%   Intravenous Once    acetaminophen  1,000 mg Oral Q8H    allopurinoL  50 mg Oral Daily    atorvastatin  80 mg Oral Daily    DAPTOmycin (CUBICIN) IV (PEDS and ADULTS)  8 mg/kg Intravenous Q48H    ertapenem (INVANZ) IVPB  500 mg Intravenous Q24H    famotidine  20 mg Oral Daily    folic acid  1 mg Oral Daily    heparin (porcine)  5,000 Units Subcutaneous Q8H    LIDOcaine  1 patch Transdermal Q24H    mupirocin   Nasal BID     Continuous Infusions:   sodium chloride 0.9% 10 mL/hr at 01/04/24 1200     PRN Meds:0.9%  NaCl infusion (for blood administration), sodium chloride 0.9%, benzonatate, dextrose 10%, dextrose 10%, glucagon (human recombinant), glucose, glucose, heparin (porcine), HYDROmorphone, insulin aspart U-100, magnesium oxide, methocarbamoL, naloxone, oxyCODONE, senna-docusate 8.6-50 mg, sodium chloride 0.9%, sodium chloride 0.9%    Review of patient's allergies indicates:  No Known Allergies     Past Medical History:   Diagnosis Date    Acute congestive heart failure 3/20/2020    Alcohol abuse     Arthritis     Asthma attack 11/24/2021    Coronary artery disease     Diabetes mellitus     Hyperlipemia     Hypertension     Multiple thyroid nodules 6/14/2023    Rhabdomyolysis      Past Surgical History:   Procedure Laterality Date    ECHOCARDIOGRAM,TRANSESOPHAGEAL N/A 9/21/2023    Procedure: Transesophageal echo (MARIA) intra-procedure log documentation;  Surgeon: Luisana Rodríguez MD;  Location: [facility], [place] CATH LAB;  Service: Cardiology;  Laterality: N/A;    EYE SURGERY      IRRIGATION AND DEBRIDEMENT Bilateral 12/1/2023    Procedure: IRRIGATION AND DEBRIDEMENT;  Surgeon: Jenna Gutiérrez DPM;  Location: [facility] [place] OR;  Service: Podiatry;  Laterality: Bilateral;  Request antibiotic beads    TRANSESOPHAGEAL ECHOCARDIOGRAM WITH POSSIBLE CARDIOVERSION (MARIA W/ POSS CARDIOVERSION) N/A 1/5/2023    Procedure: Transesophageal echo (MARIA) intra-procedure log documentation;  Surgeon: Indra SHARMA  MD Dary;  Location: [facility] CATH LAB;  Service: Cardiology;  Laterality: N/A;    TRANSESOPHAGEAL ECHOCARDIOGRAPHY N/A 9/21/2023    Procedure: ECHOCARDIOGRAM, TRANSESOPHAGEAL;  Surgeon: Luisana Rodríguez MD;  Location: [facility] CATH LAB;  Service: Cardiology;  Laterality: N/A;    TREATMENT OF CARDIAC ARRHYTHMIA N/A 12/7/2020    Procedure: Cardioversion or Defibrillation;  Surgeon: Indra Foote MD;  Location: [facility] CATH LAB;  Service: Cardiology;  Laterality: N/A;    TREATMENT OF CARDIAC ARRHYTHMIA N/A 12/30/2020    Procedure: Cardioversion or Defibrillation;  Surgeon: Tyrone Steen MD;  Location: [facility] CATH LAB;  Service: Cardiology;  Laterality: N/A;    TREATMENT OF CARDIAC ARRHYTHMIA N/A 1/5/2023    Procedure: Cardioversion or Defibrillation;  Surgeon: Indra Foote MD;  Location: [facility] CATH LAB;  Service: Cardiology;  Laterality: N/A;    VASCULAR SURGERY         Family History       Problem Relation (Age of Onset)    Diabetes Mother, Father, Sister    Hypertension Mother, Father, Sister          Tobacco Use    Smoking status: Never    Smokeless tobacco: Never   Substance and Sexual Activity    Alcohol use: Not Currently     Alcohol/week: 6.0 standard drinks of alcohol     Types: 6 Cans of beer per week    Drug use: No    Sexual activity: Yes     Partners: Female     Review of Systems   Skin:  Positive for wound.       Objective:     Vital Signs (Most Recent):  Temp: 98.7 °F (37.1 °C) (01/04/24 1101)  Pulse: 79 (01/04/24 1200)  Resp: 18 (01/04/24 1200)  BP: (!) 153/73 (01/04/24 1200)  SpO2: 98 % (01/04/24 1200) Vital Signs (24h Range):  Temp:  [97.9 °F (36.6 °C)-98.7 °F (37.1 °C)] 98.7 °F (37.1 °C)  Pulse:  [79-81] 79  Resp:  [13-30] 18  SpO2:  [93 %-100 %] 98 %  BP: (122-161)/() 153/73     Weight: 80.7 kg (178 lb)  Body mass index is 24.83 kg/m².     Physical Exam  Constitutional:       Appearance: Normal appearance.   Skin:     General: Skin is warm and dry.      Findings: Lesion present.    Neurological:      Mental Status: He is alert.          Laboratory:  All pertinent labs reviewed within the last 24 hours.    Diagnostic Results:  None

## 2024-01-04 NOTE — ASSESSMENT & PLAN NOTE
- consult received for evaluation of skin injury.  - pt presented from [facility] with RLQ pain, fatigue, bradycardia ~30's, weakness, and lab abnormalities including Cr 7.1 and .   - scattered areas of healing partial thickness tissue loss to sacrum with pale pink wound base.   - likely due to moisture from incontinence and friction. Stool episode noted during assessment.  - continue foam dressing to area.  - Immerse surface.  - heel foams in place.  - wedge for offloading.  - jackson catheter.  - turn q2h.  - most recent louisa score of 14 with nutrition sub scale score of 3.  - RD following. Optimize nutrition for wound healing.  - nursing to maintain pressure injury prevention measures and continue wound care per orders.

## 2024-01-04 NOTE — ASSESSMENT & PLAN NOTE
Baseline creatinine 2-2.5 (longstanding DM with bilateral osteomyelitis, HTN)  Multiple comorbitites as well     On admission serum creatinine 7.5  ELIZABETH appears to be multifactorial, possible prolonged pre-renal state/ATN from hemodynamic changes from CHB vs progressive CKD.   Obstruction relieved by jackson placed 12/28/23 with 500 cc of purulent UOP. Supposedly follows with urology in outpatient setting. Is on tamsulosin.   Pyelonephritis with UA showing 3+ leuks with many bacteria. History of proteus in past.   Low effective circulating volume suggested by physical exam, evidence of acute liver injury in setting of HFpEF with severe aortic stenosis. Home medication included metolazone 1 mg bid     Dialysis consent obtained 12/28/23  Renal US with no hydronephrosis   UCPR: 2.05  Azeem 22  Microalbumin / creatinine ratio: 647.3     Plan/Recommendation  -No HD plans today. Will monitor for renal recovery.        -Continue jackson and monitor UOP  -Strict ins and outs  -Avoid nephrotoxic agents if possible and renally dose medications  -Avoid drastic hemodynamic changes if possible

## 2024-01-04 NOTE — PROGRESS NOTES
[facility] - Cardiac Intensive [facility]  Nephrology  Progress Note    Patient Name: Chato Bowie  MRN: [id number]  Admission Date: 12/28/2023  Hospital Length of Stay: 7 days  Attending Provider: Stephen Werner MD   Primary Care Physician: Irlanda Valenzuela -  Principal Problem:Complete heart block    Subjective:     Interval History:   HD completed on yesterday. No UF. Electrolytes stable. 24 hr  mL. No distress on exam. Appears comfortable on RA.   Mentation poor. Unsure of baseline.       Review of patient's allergies indicates:  No Known Allergies  Current Facility-Administered Medications   Medication Frequency    0.9%  NaCl infusion (for blood administration) Q24H PRN    0.9%  NaCl infusion Continuous    0.9%  NaCl infusion PRN    0.9%  NaCl infusion Once    acetaminophen tablet 1,000 mg Q8H    allopurinol split tablet 50 mg Daily    atorvastatin tablet 80 mg Daily    benzonatate capsule 100 mg TID PRN    DAPTOmycin (CUBICIN) 650 mg in sodium chloride 0.9% SolP 50 mL IVPB Q48H    dextrose 10% bolus 125 mL 125 mL PRN    dextrose 10% bolus 250 mL 250 mL PRN    ertapenem (INVANZ) 500 mg in sodium chloride 0.9% 100 mL IVPB Q24H    famotidine tablet 20 mg Daily    folic acid tablet 1 mg Daily    glucagon (human recombinant) injection 1 mg PRN    glucose chewable tablet 16 g PRN    glucose chewable tablet 24 g PRN    heparin (porcine) injection 1,000 Units PRN    heparin (porcine) injection 5,000 Units Q8H    HYDROmorphone injection 0.5 mg Q6H PRN    insulin aspart U-100 pen 0-5 Units QID (AC + HS) PRN    LIDOcaine 5 % patch 1 patch Q24H    magnesium oxide tablet 800 mg PRN    methocarbamoL tablet 500 mg QID PRN    mupirocin 2 % ointment BID    naloxone 0.4 mg/mL injection 0.02 mg PRN    oxyCODONE immediate release tablet 5 mg Q6H PRN    senna-docusate 8.6-50 mg per tablet 2 tablet BID PRN    sodium chloride 0.9% bolus 250 mL 250 mL PRN    sodium chloride 0.9% flush 10 mL Q12H PRN       Objective:     Vital  Signs (Most Recent):  Temp: 98.7 °F (37.1 °C) (01/04/24 1101)  Pulse: 79 (01/04/24 1200)  Resp: 18 (01/04/24 1200)  BP: (!) 153/73 (01/04/24 1200)  SpO2: 98 % (01/04/24 1200) Vital Signs (24h Range):  Temp:  [97.9 °F (36.6 °C)-98.7 °F (37.1 °C)] 98.7 °F (37.1 °C)  Pulse:  [79-81] 79  Resp:  [13-30] 18  SpO2:  [93 %-100 %] 98 %  BP: (122-161)/() 153/73     Weight: 80.7 kg (178 lb) (12/29/23 0703)  Body mass index is 24.83 kg/m².  Body surface area is 2.01 meters squared.    I/O last 3 completed shifts:  In: 1159.8 [P.O.:920; I.V.:239.8]  Out: 1046 [Urine:830; Other:216]     Physical Exam  Vitals and nursing note reviewed.   Constitutional:       General: He is not in acute distress.     Appearance: He is ill-appearing. He is not toxic-appearing.      Interventions: Nasal cannula in place.   HENT:      Head: Normocephalic and atraumatic.      Nose: Nose normal.      Mouth/Throat:      Mouth: Mucous membranes are dry.      Pharynx: No oropharyngeal exudate.   Eyes:      General: No scleral icterus.        Right eye: No discharge.         Left eye: No discharge.   Cardiovascular:      Rate and Rhythm: Normal rate.   Pulmonary:      Effort: Pulmonary effort is normal. No respiratory distress.      Breath sounds: No wheezing or rales.   Abdominal:      General: Abdomen is flat.   Musculoskeletal:      Cervical back: Normal range of motion. No rigidity.      Right lower leg: No edema.      Left lower leg: Edema present.      Comments: Bilateral heels wrapped in ACE bandages.    Lymphadenopathy:      Cervical: No cervical adenopathy.   Neurological:      Mental Status: He is alert and easily aroused.      Comments: Alert, slow to respond   Psychiatric:         Behavior: Behavior is uncooperative.          Significant Labs:  CBC:   Recent Labs   Lab 01/04/24  0414   WBC 7.42   RBC 3.20*   HGB 8.0*   HCT 26.2*      MCV 82   MCH 25.0*   MCHC 30.5*     CMP:   Recent Labs   Lab 01/04/24  0414   GLU 80   CALCIUM 8.3*    ALBUMIN 1.9*   PROT 6.2      K 3.4*   CO2 24      BUN 49*   CREATININE 4.2*   ALKPHOS 89   ALT 34   AST 29   BILITOT 0.5     All labs within the past 24 hours have been reviewed.   Assessment/Plan:     Cardiac/Vascular  * Complete heart block  - defer to primary team     Renal/  Acute renal failure superimposed on stage 3b chronic kidney disease  Baseline creatinine 2-2.5 (longstanding DM with bilateral osteomyelitis, HTN)  Multiple comorbitites as well     On admission serum creatinine 7.5  ELIZABETH appears to be multifactorial, possible prolonged pre-renal state/ATN from hemodynamic changes from CHB vs progressive CKD.   Obstruction relieved by jackson placed 12/28/23 with 500 cc of purulent UOP. Supposedly follows with urology in outpatient setting. Is on tamsulosin.   Pyelonephritis with UA showing 3+ leuks with many bacteria. History of proteus in past.   Low effective circulating volume suggested by physical exam, evidence of acute liver injury in setting of HFpEF with severe aortic stenosis. Home medication included metolazone 1 mg bid     Dialysis consent obtained 12/28/23  Renal US with no hydronephrosis   UCPR: 2.05  Azeem 22  Microalbumin / creatinine ratio: 647.3     Plan/Recommendation  -No HD plans today. Will monitor for renal recovery.        -Continue jackson and monitor UOP  -Strict ins and outs  -Avoid nephrotoxic agents if possible and renally dose medications  -Avoid drastic hemodynamic changes if possible        Thank you for your consult. I will follow-up with patient. Please contact us if you have any additional questions.    Haleigh Camp DNP, FNP-C  Nephrology  [facility] - [facility]

## 2024-01-04 NOTE — ASSESSMENT & PLAN NOTE
Insight/goals of care- fair, understands limited nature of his life expectancy, but is clear he is not ready to die in the immediate future (e.g. removing pacing). Suspect prognosis of weeks if discharged with pacer, would be likely hours to days without.     Social support- Gulshan is MPOA, patient in nursing home prior to admit, unclear what level of support at home     Spiritual- did not assess    Symptom management- pain related to chronic LE wounds, pain well-controlled with current meds    Recommendations  -DNR, full support  -Leigh Gaffney is MPOA, no secondary decision-makers (sister is living but with dementia)  -however, if consent already obtained, reasonable to proceed with Micra placement   -*code status should not necessarily be reversed for this procedure; patient's MPOA has agreed to cardioversion but not intubation or CPR; this is ethically sound, has been documented in ACP note on 1/1, and should be supported   -additionally have agreed to temporarily reverse code status for the procedure if DNR unacceptable per EP team, even though CPR and intubation in conflict with patient's values; patient and niece understand risks of procedures which could include death and find them acceptable  -continue oxycodone 10mg q4hr PRN for pain, hydromorphone 0.5mg IV q6hr PRN for breakthrough/wound care

## 2024-01-04 NOTE — PROGRESS NOTES
[facility]  Cardiac Electrophysiology  Progress Note    Admission Date: 12/28/2023  Code Status: DNR   Attending Physician: Stephen Werner MD   Expected Discharge Date: 1/9/2024  Principal Problem:Complete heart block    Subjective:     Interval History: Had an episode of unresponsiveness on 1/1 and TdP which resolved after 10-20 seconds of chest compressions. After that he was sinus with complete heart block. TVP was placed and he has been paced at a rate of 80. Underlying rhythm still sinus with complete heart block.    Waxing and waning mentation. Yesterday morning as well as today so far he was very clearly able to have full conversations with good mentation. Yesterday palliative care discussed with him and his niece and he would prefer to allow cardioversion but no chest compressions. If absolutely necessary for the procedure to happen, he would be okay with reversing his code status to full code but in a catastrophic event where is intubated with poor prognosis, would likely be palliatively extubated afterward.      Review of Systems   Constitutional: Negative for diaphoresis and fever.   Cardiovascular:  Negative for chest pain, dyspnea on exertion, leg swelling, near-syncope, orthopnea, palpitations, paroxysmal nocturnal dyspnea and syncope.   Respiratory:  Negative for cough and shortness of breath.    Gastrointestinal:  Negative for abdominal pain, diarrhea, nausea and vomiting.   Neurological:  Negative for light-headedness.   Psychiatric/Behavioral:  Negative for altered mental status and substance abuse.      Objective:     Vital Signs (Most Recent):  Temp: 98.3 °F (36.8 °C) (01/04/24 0701)  Pulse: 79 (01/04/24 1000)  Resp: 16 (01/04/24 1000)  BP: 136/65 (01/04/24 1000)  SpO2: 100 % (01/04/24 1000) Vital Signs (24h Range):  Temp:  [97.9 °F (36.6 °C)-98.5 °F (36.9 °C)] 98.3 °F (36.8 °C)  Pulse:  [79-81] 79  Resp:  [13-30] 16  SpO2:  [93 %-100 %] 100 %  BP: (122-161)/()  136/65     Weight: 80.7 kg (178 lb)  Body mass index is 24.83 kg/m².     SpO2: 100 %    Telemetry : Complete heart block, HR 49. In the AM patient had an episode of TdP and then sinus tachy with CHB.     Physical Exam  Constitutional:       General: He is not in acute distress.     Appearance: He is ill-appearing.   HENT:      Mouth/Throat:      Mouth: Mucous membranes are dry.   Cardiovascular:      Rate and Rhythm: Normal rate.      Heart sounds: Murmur heard.      Comments: TVP-paced at 80  Pulmonary:      Effort: Pulmonary effort is normal.      Breath sounds: Normal breath sounds.   Musculoskeletal:      Right lower leg: No edema.      Left lower leg: No edema.   Skin:     General: Skin is warm.   Neurological:      Mental Status: He is disoriented.          Significant Labs:     Recent Labs   Lab 24  0414   WBC 9.68 8.22 7.42   HGB 8.1* 8.5* 8.0*   HCT 26.8* 27.1* 26.2*    294 255         Recent Labs   Lab 24  0316 24  0414    137 139   K 3.7 3.4* 3.4*    100 105   CO2 21* 23 24   BUN 88* 88* 49*   CREATININE 6.4* 6.0* 4.2*   CALCIUM 9.2 8.8 8.3*   PHOS 5.0* 4.9* 3.0         Recent Labs   Lab 246 24  0414   ALKPHOS 91 90 89   BILITOT 0.5 0.4 0.5   PROT 6.6 6.4 6.2   ALT 55* 41 34   AST 40 31 29             Recent Labs   Lab 23  2324 24  0630 24  0912   *  --  155   TROPONINI  --  0.384*  --        Significant Imagin/29/23    Left Ventricle: The left ventricle is normal in size. Ventricular mass is normal. Normal wall thickness. There is concentric remodeling. Septal motion is consistent with bundle branch block. There is normal systolic function with a visually estimated ejection fraction of 55 - 60%. Grade II diastolic dysfunction.    Right Ventricle: Normal right ventricular cavity size. Systolic function is normal.    Left Atrium: Left atrium is severely  dilated.    Aortic Valve: There is moderate aortic valve sclerosis. Moderately restricted motion. There is severe stenosis. Aortic valve area by VTI is 0.86 cm². Aortic valve peak velocity is 4.12 m/s. Mean gradient is 43 mmHg. The dimensionless index is 0.22. There is mild to moderate aortic regurgitation.    Mitral Valve: There is bileaflet sclerosis. There is mild annular dilation present.    Tricuspid Valve: There is mild to moderate regurgitation.    Pulmonary Artery: The estimated pulmonary artery systolic pressure is 72 mmHg.    IVC/SVC: Intermediate venous pressure at 8 mmHg.  Assessment and Plan:     * Complete heart block  Patient with complete heart block, Previous EKG with first degree AV block and LBBB. Patient on metoprolol and amiodarone as an outpatient  Patient hemodynamically stable, but is unable to give a reliable history  Had an episode of [place] Point[place] this am, and then sinus tachycardia with complete heart block  Evaluated by ID who recommended ATB therapy anticipated for 7 days   DNR for now    On longer discussion with the patient and HCPOA (niece), they are okay with reversing code status if needed for pacemaker placement. Code status will likely be reversed for the procedure and then back to DNR afterward.    Recommendations:  -TVP placement due to acute event  -Hold AV iker agents  -Primary discussing hospice and goals of care with family  -Shardaa pending scheduling        Connor M Gillies, MD  Cardiac Electrophysiology  [facility]

## 2024-01-05 ENCOUNTER — ANESTHESIA EVENT (OUTPATIENT)
Dept: MEDSURG UNIT | Facility: HOSPITAL | Age: 74
DRG: 228 | End: 2024-01-05
Payer: MEDICARE

## 2024-01-05 ENCOUNTER — ANESTHESIA (OUTPATIENT)
Dept: MEDSURG UNIT | Facility: HOSPITAL | Age: 74
DRG: 228 | End: 2024-01-05
Payer: MEDICARE

## 2024-01-05 DIAGNOSIS — I48.92 PAROXYSMAL ATRIAL FLUTTER: Primary | Chronic | ICD-10-CM

## 2024-01-05 PROBLEM — E04.2 MULTIPLE THYROID NODULES: Status: RESOLVED | Noted: 2023-06-14 | Resolved: 2024-01-05

## 2024-01-05 LAB
ABO + RH BLD: NORMAL
ALBUMIN SERPL BCP-MCNC: 1.8 G/DL (ref 3.5–5.2)
ALBUMIN SERPL ELPH-MCNC: 2.23 G/DL (ref 3.35–5.55)
ALP SERPL-CCNC: 97 U/L (ref 55–135)
ALPHA1 GLOB SERPL ELPH-MCNC: 0.4 G/DL (ref 0.17–0.41)
ALPHA2 GLOB SERPL ELPH-MCNC: 0.73 G/DL (ref 0.43–0.99)
ALT SERPL W/O P-5'-P-CCNC: 32 U/L (ref 10–44)
ANION GAP SERPL CALC-SCNC: 11 MMOL/L (ref 8–16)
ANION GAP SERPL CALC-SCNC: 9 MMOL/L (ref 8–16)
AST SERPL-CCNC: 31 U/L (ref 10–40)
B-GLOBULIN SERPL ELPH-MCNC: 0.65 G/DL (ref 0.5–1.1)
BASOPHILS # BLD AUTO: 0.05 K/UL (ref 0–0.2)
BASOPHILS # BLD AUTO: 0.07 K/UL (ref 0–0.2)
BASOPHILS # BLD AUTO: 0.07 K/UL (ref 0–0.2)
BASOPHILS NFR BLD: 0.4 % (ref 0–1.9)
BASOPHILS NFR BLD: 0.7 % (ref 0–1.9)
BASOPHILS NFR BLD: 0.8 % (ref 0–1.9)
BILIRUB SERPL-MCNC: 0.4 MG/DL (ref 0.1–1)
BLD GP AB SCN CELLS X3 SERPL QL: NORMAL
BLD PROD TYP BPU: NORMAL
BLOOD UNIT EXPIRATION DATE: NORMAL
BLOOD UNIT TYPE CODE: 7300
BLOOD UNIT TYPE: NORMAL
BUN SERPL-MCNC: 53 MG/DL (ref 8–23)
BUN SERPL-MCNC: 56 MG/DL (ref 8–23)
CALCIUM SERPL-MCNC: 8.4 MG/DL (ref 8.7–10.5)
CALCIUM SERPL-MCNC: 8.5 MG/DL (ref 8.7–10.5)
CHLORIDE SERPL-SCNC: 105 MMOL/L (ref 95–110)
CHLORIDE SERPL-SCNC: 107 MMOL/L (ref 95–110)
CO2 SERPL-SCNC: 24 MMOL/L (ref 23–29)
CO2 SERPL-SCNC: 24 MMOL/L (ref 23–29)
CODING SYSTEM: NORMAL
CREAT SERPL-MCNC: 4.8 MG/DL (ref 0.5–1.4)
CREAT SERPL-MCNC: 5 MG/DL (ref 0.5–1.4)
CROSSMATCH INTERPRETATION: NORMAL
DIFFERENTIAL METHOD BLD: ABNORMAL
DISPENSE STATUS: NORMAL
EOSINOPHIL # BLD AUTO: 0.2 K/UL (ref 0–0.5)
EOSINOPHIL # BLD AUTO: 0.2 K/UL (ref 0–0.5)
EOSINOPHIL # BLD AUTO: 0.3 K/UL (ref 0–0.5)
EOSINOPHIL NFR BLD: 1 % (ref 0–8)
EOSINOPHIL NFR BLD: 2.3 % (ref 0–8)
EOSINOPHIL NFR BLD: 3.1 % (ref 0–8)
ERYTHROCYTE [DISTWIDTH] IN BLOOD BY AUTOMATED COUNT: 17.4 % (ref 11.5–14.5)
ERYTHROCYTE [DISTWIDTH] IN BLOOD BY AUTOMATED COUNT: 18.2 % (ref 11.5–14.5)
ERYTHROCYTE [DISTWIDTH] IN BLOOD BY AUTOMATED COUNT: 18.5 % (ref 11.5–14.5)
EST. GFR  (NO RACE VARIABLE): 11.5 ML/MIN/1.73 M^2
EST. GFR  (NO RACE VARIABLE): 12.1 ML/MIN/1.73 M^2
GAMMA GLOB SERPL ELPH-MCNC: 1.29 G/DL (ref 0.67–1.58)
GLUCOSE SERPL-MCNC: 82 MG/DL (ref 70–110)
GLUCOSE SERPL-MCNC: 86 MG/DL (ref 70–110)
HCT VFR BLD AUTO: 23.4 % (ref 40–54)
HCT VFR BLD AUTO: 23.9 % (ref 40–54)
HCT VFR BLD AUTO: 25.4 % (ref 40–54)
HGB BLD-MCNC: 7.3 G/DL (ref 14–18)
HGB BLD-MCNC: 7.5 G/DL (ref 14–18)
HGB BLD-MCNC: 8 G/DL (ref 14–18)
IMM GRANULOCYTES # BLD AUTO: 0.03 K/UL (ref 0–0.04)
IMM GRANULOCYTES # BLD AUTO: 0.05 K/UL (ref 0–0.04)
IMM GRANULOCYTES # BLD AUTO: 0.16 K/UL (ref 0–0.04)
IMM GRANULOCYTES NFR BLD AUTO: 0.4 % (ref 0–0.5)
IMM GRANULOCYTES NFR BLD AUTO: 0.6 % (ref 0–0.5)
IMM GRANULOCYTES NFR BLD AUTO: 0.9 % (ref 0–0.5)
INTERPRETATION SERPL IFE-IMP: NORMAL
LYMPHOCYTES # BLD AUTO: 0.8 K/UL (ref 1–4.8)
LYMPHOCYTES # BLD AUTO: 0.9 K/UL (ref 1–4.8)
LYMPHOCYTES # BLD AUTO: 1 K/UL (ref 1–4.8)
LYMPHOCYTES NFR BLD: 11.1 % (ref 18–48)
LYMPHOCYTES NFR BLD: 13.6 % (ref 18–48)
LYMPHOCYTES NFR BLD: 4.7 % (ref 18–48)
MAGNESIUM SERPL-MCNC: 2.6 MG/DL (ref 1.6–2.6)
MCH RBC QN AUTO: 24.7 PG (ref 27–31)
MCH RBC QN AUTO: 25.2 PG (ref 27–31)
MCH RBC QN AUTO: 26.3 PG (ref 27–31)
MCHC RBC AUTO-ENTMCNC: 31.2 G/DL (ref 32–36)
MCHC RBC AUTO-ENTMCNC: 31.4 G/DL (ref 32–36)
MCHC RBC AUTO-ENTMCNC: 31.5 G/DL (ref 32–36)
MCV RBC AUTO: 79 FL (ref 82–98)
MCV RBC AUTO: 80 FL (ref 82–98)
MCV RBC AUTO: 84 FL (ref 82–98)
MONOCYTES # BLD AUTO: 0.7 K/UL (ref 0.3–1)
MONOCYTES # BLD AUTO: 1 K/UL (ref 0.3–1)
MONOCYTES # BLD AUTO: 1.1 K/UL (ref 0.3–1)
MONOCYTES NFR BLD: 10.1 % (ref 4–15)
MONOCYTES NFR BLD: 11.5 % (ref 4–15)
MONOCYTES NFR BLD: 6.3 % (ref 4–15)
NEUTROPHILS # BLD AUTO: 15 K/UL (ref 1.8–7.7)
NEUTROPHILS # BLD AUTO: 5.3 K/UL (ref 1.8–7.7)
NEUTROPHILS # BLD AUTO: 6.2 K/UL (ref 1.8–7.7)
NEUTROPHILS NFR BLD: 72.9 % (ref 38–73)
NEUTROPHILS NFR BLD: 72.9 % (ref 38–73)
NEUTROPHILS NFR BLD: 86.7 % (ref 38–73)
NRBC BLD-RTO: 0 /100 WBC
PATHOLOGIST INTERPRETATION IFE: NORMAL
PATHOLOGIST INTERPRETATION SPE: NORMAL
PHOSPHATE SERPL-MCNC: 3.4 MG/DL (ref 2.7–4.5)
PLATELET # BLD AUTO: 213 K/UL (ref 150–450)
PLATELET # BLD AUTO: 226 K/UL (ref 150–450)
PLATELET # BLD AUTO: 240 K/UL (ref 150–450)
PMV BLD AUTO: 8.9 FL (ref 9.2–12.9)
PMV BLD AUTO: 8.9 FL (ref 9.2–12.9)
PMV BLD AUTO: 9 FL (ref 9.2–12.9)
POCT GLUCOSE: 159 MG/DL (ref 70–110)
POCT GLUCOSE: 160 MG/DL (ref 70–110)
POCT GLUCOSE: 90 MG/DL (ref 70–110)
POCT GLUCOSE: 97 MG/DL (ref 70–110)
POTASSIUM SERPL-SCNC: 3.5 MMOL/L (ref 3.5–5.1)
POTASSIUM SERPL-SCNC: 3.8 MMOL/L (ref 3.5–5.1)
PROT SERPL-MCNC: 5.3 G/DL (ref 6–8.4)
PROT SERPL-MCNC: 6 G/DL (ref 6–8.4)
RBC # BLD AUTO: 2.96 M/UL (ref 4.6–6.2)
RBC # BLD AUTO: 2.98 M/UL (ref 4.6–6.2)
RBC # BLD AUTO: 3.04 M/UL (ref 4.6–6.2)
SODIUM SERPL-SCNC: 140 MMOL/L (ref 136–145)
SODIUM SERPL-SCNC: 140 MMOL/L (ref 136–145)
SPECIMEN OUTDATE: NORMAL
TRANS ERYTHROCYTES VOL PATIENT: NORMAL ML
WBC # BLD AUTO: 17.26 K/UL (ref 3.9–12.7)
WBC # BLD AUTO: 7.3 K/UL (ref 3.9–12.7)
WBC # BLD AUTO: 8.45 K/UL (ref 3.9–12.7)

## 2024-01-05 PROCEDURE — 86920 COMPATIBILITY TEST SPIN: CPT

## 2024-01-05 PROCEDURE — 84165 PROTEIN E-PHORESIS SERUM: CPT | Mod: 26,,, | Performed by: PATHOLOGY

## 2024-01-05 PROCEDURE — 85025 COMPLETE CBC W/AUTO DIFF WBC: CPT | Mod: 91

## 2024-01-05 PROCEDURE — D9220A PRA ANESTHESIA: Mod: ANES,,, | Performed by: ANESTHESIOLOGY

## 2024-01-05 PROCEDURE — 99231 SBSQ HOSP IP/OBS SF/LOW 25: CPT | Mod: GC,,, | Performed by: INTERNAL MEDICINE

## 2024-01-05 PROCEDURE — 33274 TCAT INSJ/RPL PERM LDLS PM: CPT | Mod: ,,, | Performed by: INTERNAL MEDICINE

## 2024-01-05 PROCEDURE — 83735 ASSAY OF MAGNESIUM: CPT | Performed by: INTERNAL MEDICINE

## 2024-01-05 PROCEDURE — 63600175 PHARM REV CODE 636 W HCPCS: Performed by: INTERNAL MEDICINE

## 2024-01-05 PROCEDURE — C1785 PMKR, DUAL, RATE-RESP: HCPCS | Performed by: INTERNAL MEDICINE

## 2024-01-05 PROCEDURE — 80053 COMPREHEN METABOLIC PANEL: CPT | Performed by: INTERNAL MEDICINE

## 2024-01-05 PROCEDURE — D9220A PRA ANESTHESIA: Mod: CRNA,,, | Performed by: NURSE ANESTHETIST, CERTIFIED REGISTERED

## 2024-01-05 PROCEDURE — 84100 ASSAY OF PHOSPHORUS: CPT | Performed by: INTERNAL MEDICINE

## 2024-01-05 PROCEDURE — 33274 TCAT INSJ/RPL PERM LDLS PM: CPT | Performed by: INTERNAL MEDICINE

## 2024-01-05 PROCEDURE — P9021 RED BLOOD CELLS UNIT: HCPCS

## 2024-01-05 PROCEDURE — 84165 PROTEIN E-PHORESIS SERUM: CPT | Performed by: INTERNAL MEDICINE

## 2024-01-05 PROCEDURE — 86901 BLOOD TYPING SEROLOGIC RH(D): CPT

## 2024-01-05 PROCEDURE — 86334 IMMUNOFIX E-PHORESIS SERUM: CPT | Performed by: INTERNAL MEDICINE

## 2024-01-05 PROCEDURE — 27000207 HC ISOLATION

## 2024-01-05 PROCEDURE — 37000009 HC ANESTHESIA EA ADD 15 MINS: Performed by: INTERNAL MEDICINE

## 2024-01-05 PROCEDURE — 80048 BASIC METABOLIC PNL TOTAL CA: CPT | Mod: XB | Performed by: INTERNAL MEDICINE

## 2024-01-05 PROCEDURE — 25000003 PHARM REV CODE 250: Performed by: INTERNAL MEDICINE

## 2024-01-05 PROCEDURE — 85025 COMPLETE CBC W/AUTO DIFF WBC: CPT | Mod: 91 | Performed by: STUDENT IN AN ORGANIZED HEALTH CARE EDUCATION/TRAINING PROGRAM

## 2024-01-05 PROCEDURE — 20600001 HC STEP DOWN PRIVATE ROOM

## 2024-01-05 PROCEDURE — 25000003 PHARM REV CODE 250

## 2024-01-05 PROCEDURE — 25000003 PHARM REV CODE 250: Performed by: NURSE ANESTHETIST, CERTIFIED REGISTERED

## 2024-01-05 PROCEDURE — 85025 COMPLETE CBC W/AUTO DIFF WBC: CPT | Performed by: INTERNAL MEDICINE

## 2024-01-05 PROCEDURE — C1769 GUIDE WIRE: HCPCS | Performed by: INTERNAL MEDICINE

## 2024-01-05 PROCEDURE — 93005 ELECTROCARDIOGRAM TRACING: CPT

## 2024-01-05 PROCEDURE — 63600175 PHARM REV CODE 636 W HCPCS

## 2024-01-05 PROCEDURE — 99233 SBSQ HOSP IP/OBS HIGH 50: CPT | Mod: ,,, | Performed by: NURSE PRACTITIONER

## 2024-01-05 PROCEDURE — 86334 IMMUNOFIX E-PHORESIS SERUM: CPT | Mod: 26,,, | Performed by: PATHOLOGY

## 2024-01-05 PROCEDURE — 37000008 HC ANESTHESIA 1ST 15 MINUTES: Performed by: INTERNAL MEDICINE

## 2024-01-05 PROCEDURE — C1786 PMKR, SINGLE, RATE-RESP: HCPCS | Performed by: INTERNAL MEDICINE

## 2024-01-05 PROCEDURE — 02HK3NZ INSERTION OF INTRACARDIAC PACEMAKER INTO RIGHT VENTRICLE, PERCUTANEOUS APPROACH: ICD-10-PCS | Performed by: INTERNAL MEDICINE

## 2024-01-05 PROCEDURE — 63600175 PHARM REV CODE 636 W HCPCS: Performed by: NURSE ANESTHETIST, CERTIFIED REGISTERED

## 2024-01-05 PROCEDURE — C1894 INTRO/SHEATH, NON-LASER: HCPCS | Performed by: INTERNAL MEDICINE

## 2024-01-05 PROCEDURE — 93010 ELECTROCARDIOGRAM REPORT: CPT | Mod: ,,, | Performed by: INTERNAL MEDICINE

## 2024-01-05 PROCEDURE — 25000003 PHARM REV CODE 250: Performed by: STUDENT IN AN ORGANIZED HEALTH CARE EDUCATION/TRAINING PROGRAM

## 2024-01-05 DEVICE — SYS MICRA AV PACING TRANSCATH: Type: IMPLANTABLE DEVICE | Site: HEART | Status: FUNCTIONAL

## 2024-01-05 RX ORDER — HEPARIN SODIUM 1000 [USP'U]/ML
INJECTION, SOLUTION INTRAVENOUS; SUBCUTANEOUS
Status: DISCONTINUED | OUTPATIENT
Start: 2024-01-05 | End: 2024-01-05

## 2024-01-05 RX ORDER — NOREPINEPHRINE BITARTRATE/D5W 4MG/250ML
PLASTIC BAG, INJECTION (ML) INTRAVENOUS
Status: DISPENSED
Start: 2024-01-05 | End: 2024-01-06

## 2024-01-05 RX ORDER — FENTANYL CITRATE 50 UG/ML
INJECTION, SOLUTION INTRAMUSCULAR; INTRAVENOUS
Status: DISCONTINUED | OUTPATIENT
Start: 2024-01-05 | End: 2024-01-05

## 2024-01-05 RX ORDER — HYDROCODONE BITARTRATE AND ACETAMINOPHEN 500; 5 MG/1; MG/1
TABLET ORAL
Status: DISCONTINUED | OUTPATIENT
Start: 2024-01-05 | End: 2024-01-12

## 2024-01-05 RX ORDER — PROPOFOL 10 MG/ML
VIAL (ML) INTRAVENOUS
Status: DISCONTINUED | OUTPATIENT
Start: 2024-01-05 | End: 2024-01-05

## 2024-01-05 RX ORDER — CEFAZOLIN SODIUM 1 G/3ML
INJECTION, POWDER, FOR SOLUTION INTRAMUSCULAR; INTRAVENOUS
Status: DISCONTINUED | OUTPATIENT
Start: 2024-01-05 | End: 2024-01-05

## 2024-01-05 RX ORDER — HEPARIN SOD,PORCINE/0.9 % NACL 1000/500ML
INTRAVENOUS SOLUTION INTRAVENOUS
Status: DISCONTINUED | OUTPATIENT
Start: 2024-01-05 | End: 2024-01-19 | Stop reason: HOSPADM

## 2024-01-05 RX ORDER — POTASSIUM CHLORIDE 750 MG/1
30 CAPSULE, EXTENDED RELEASE ORAL ONCE
Status: COMPLETED | OUTPATIENT
Start: 2024-01-05 | End: 2024-01-05

## 2024-01-05 RX ORDER — LIDOCAINE HYDROCHLORIDE 20 MG/ML
INJECTION, SOLUTION INFILTRATION; PERINEURAL
Status: DISCONTINUED | OUTPATIENT
Start: 2024-01-05 | End: 2024-01-19 | Stop reason: HOSPADM

## 2024-01-05 RX ORDER — PROPOFOL 10 MG/ML
VIAL (ML) INTRAVENOUS CONTINUOUS PRN
Status: DISCONTINUED | OUTPATIENT
Start: 2024-01-05 | End: 2024-01-05

## 2024-01-05 RX ORDER — LIDOCAINE HYDROCHLORIDE 20 MG/ML
INJECTION, SOLUTION EPIDURAL; INFILTRATION; INTRACAUDAL; PERINEURAL
Status: DISCONTINUED | OUTPATIENT
Start: 2024-01-05 | End: 2024-01-05

## 2024-01-05 RX ORDER — NOREPINEPHRINE BITARTRATE/D5W 4MG/250ML
0-3 PLASTIC BAG, INJECTION (ML) INTRAVENOUS CONTINUOUS
Status: DISCONTINUED | OUTPATIENT
Start: 2024-01-05 | End: 2024-01-05

## 2024-01-05 RX ORDER — POTASSIUM CHLORIDE 750 MG/1
10 CAPSULE, EXTENDED RELEASE ORAL ONCE
Status: COMPLETED | OUTPATIENT
Start: 2024-01-05 | End: 2024-01-05

## 2024-01-05 RX ADMIN — CEFAZOLIN 2 G: 330 INJECTION, POWDER, FOR SOLUTION INTRAMUSCULAR; INTRAVENOUS at 10:01

## 2024-01-05 RX ADMIN — ATORVASTATIN CALCIUM 80 MG: 40 TABLET, FILM COATED ORAL at 09:01

## 2024-01-05 RX ADMIN — MUPIROCIN: 20 OINTMENT TOPICAL at 10:01

## 2024-01-05 RX ADMIN — SODIUM CHLORIDE: 0.9 INJECTION, SOLUTION INTRAVENOUS at 09:01

## 2024-01-05 RX ADMIN — ERTAPENEM 500 MG: 1 INJECTION INTRAMUSCULAR; INTRAVENOUS at 01:01

## 2024-01-05 RX ADMIN — ACETAMINOPHEN 1000 MG: 500 TABLET ORAL at 09:01

## 2024-01-05 RX ADMIN — LIDOCAINE HYDROCHLORIDE 100 MG: 20 INJECTION, SOLUTION EPIDURAL; INFILTRATION; INTRACAUDAL; PERINEURAL at 10:01

## 2024-01-05 RX ADMIN — FENTANYL CITRATE 25 MCG: 0.05 INJECTION, SOLUTION INTRAMUSCULAR; INTRAVENOUS at 10:01

## 2024-01-05 RX ADMIN — PROPOFOL 25 MCG/KG/MIN: 10 INJECTION, EMULSION INTRAVENOUS at 10:01

## 2024-01-05 RX ADMIN — HEPARIN SODIUM 5000 UNITS: 5000 INJECTION INTRAVENOUS; SUBCUTANEOUS at 06:01

## 2024-01-05 RX ADMIN — FAMOTIDINE 20 MG: 20 TABLET, FILM COATED ORAL at 09:01

## 2024-01-05 RX ADMIN — POTASSIUM CHLORIDE 10 MEQ: 10 CAPSULE, COATED, EXTENDED RELEASE ORAL at 06:01

## 2024-01-05 RX ADMIN — ACETAMINOPHEN 1000 MG: 500 TABLET ORAL at 06:01

## 2024-01-05 RX ADMIN — MUPIROCIN: 20 OINTMENT TOPICAL at 09:01

## 2024-01-05 RX ADMIN — ALLOPURINOL 50 MG: 300 TABLET ORAL at 09:01

## 2024-01-05 RX ADMIN — FOLIC ACID 1 MG: 1 TABLET ORAL at 09:01

## 2024-01-05 RX ADMIN — POTASSIUM CHLORIDE 30 MEQ: 10 CAPSULE, COATED, EXTENDED RELEASE ORAL at 06:01

## 2024-01-05 RX ADMIN — HEPARIN SODIUM 3000 UNITS: 1000 INJECTION, SOLUTION INTRAVENOUS; SUBCUTANEOUS at 10:01

## 2024-01-05 RX ADMIN — SODIUM CHLORIDE 1000 ML: 0.9 INJECTION, SOLUTION INTRAVENOUS at 04:01

## 2024-01-05 RX ADMIN — BENZONATATE 100 MG: 100 CAPSULE ORAL at 10:01

## 2024-01-05 NOTE — SUBJECTIVE & OBJECTIVE
Interval History:   Electrolytes stable, non-emergent this morning.  840 ml of UOP over the past 24 hours.  Plans for pacemaker placement today.    Oxygenating well on RA this morning.    Review of patient's allergies indicates:  No Known Allergies  Current Facility-Administered Medications   Medication Frequency    0.9%  NaCl infusion (for blood administration) Q24H PRN    0.9%  NaCl infusion Continuous    0.9%  NaCl infusion PRN    acetaminophen tablet 1,000 mg Q8H    allopurinol split tablet 50 mg Daily    atorvastatin tablet 80 mg Daily    benzonatate capsule 100 mg TID PRN    DAPTOmycin (CUBICIN) 650 mg in sodium chloride 0.9% SolP 50 mL IVPB Q48H    dextrose 10% bolus 125 mL 125 mL PRN    dextrose 10% bolus 250 mL 250 mL PRN    ertapenem (INVANZ) 500 mg in sodium chloride 0.9% 100 mL IVPB Q24H    famotidine tablet 20 mg Daily    folic acid tablet 1 mg Daily    glucagon (human recombinant) injection 1 mg PRN    glucose chewable tablet 16 g PRN    glucose chewable tablet 24 g PRN    heparin (porcine) 2,000 Units in sodium chloride 0.9% 1,000 mL PRN    heparin (porcine) injection 1,000 Units PRN    heparin infusion 1,000 units/500 ml in 0.9% NaCl (on sterile field) PRN    HYDROmorphone injection 0.5 mg Q6H PRN    insulin aspart U-100 pen 0-5 Units QID (AC + HS) PRN    LIDOcaine 5 % patch 1 patch Q24H    LIDOcaine HCL 20 mg/ml (2%) injection PRN    magnesium oxide tablet 800 mg PRN    methocarbamoL tablet 500 mg QID PRN    mupirocin 2 % ointment BID    naloxone 0.4 mg/mL injection 0.02 mg PRN    oxyCODONE immediate release tablet 5 mg Q6H PRN    senna-docusate 8.6-50 mg per tablet 2 tablet BID PRN    sodium chloride 0.9% bolus 250 mL 250 mL PRN    sodium chloride 0.9% flush 10 mL Q12H PRN       Objective:     Vital Signs (Most Recent):  Temp: 98.2 °F (36.8 °C) (01/05/24 1300)  Pulse: 60 (01/05/24 1300)  Resp: 19 (01/05/24 1300)  BP: (!) 157/70 (01/05/24 1300)  SpO2: 99 % (01/05/24 1300) Vital Signs (24h  Range):  Temp:  [97.5 °F (36.4 °C)-98.7 °F (37.1 °C)] 98.2 °F (36.8 °C)  Pulse:  [44-80] 60  Resp:  [15-23] 19  SpO2:  [96 %-100 %] 99 %  BP: (123-187)/() 157/70     Weight: 80.7 kg (178 lb) (12/29/23 0703)  Body mass index is 24.83 kg/m².  Body surface area is 2.01 meters squared.    I/O last 3 completed shifts:  In: 1329.9 [P.O.:960; I.V.:369.9]  Out: 1140 [Urine:1140]     Physical Exam  Vitals and nursing note reviewed.   Constitutional:       General: He is not in acute distress.     Appearance: He is ill-appearing. He is not toxic-appearing.      Interventions: Nasal cannula in place.   HENT:      Head: Normocephalic and atraumatic.      Nose: Nose normal.      Mouth/Throat:      Mouth: Mucous membranes are dry.      Pharynx: No oropharyngeal exudate.   Eyes:      General: No scleral icterus.        Right eye: No discharge.         Left eye: No discharge.   Cardiovascular:      Rate and Rhythm: Normal rate.   Pulmonary:      Effort: Pulmonary effort is normal. No respiratory distress.      Breath sounds: No wheezing or rales.   Abdominal:      General: Abdomen is flat.   Musculoskeletal:      Cervical back: Normal range of motion. No rigidity.      Right lower leg: No edema.      Left lower leg: Edema present.   Lymphadenopathy:      Cervical: No cervical adenopathy.   Neurological:      Mental Status: He is alert and easily aroused.      Comments: Alert, slow to respond   Psychiatric:         Behavior: Behavior is cooperative.          Significant Labs:  CBC:   Recent Labs   Lab 01/05/24  0405   WBC 8.45   RBC 2.98*   HGB 7.5*   HCT 23.9*      MCV 80*   MCH 25.2*   MCHC 31.4*     CMP:   Recent Labs   Lab 01/05/24  0405 01/05/24  0920   GLU 86 82   CALCIUM 8.5* 8.4*   ALBUMIN 1.8*  --    PROT 6.0  --     140   K 3.5 3.8   CO2 24 24    107   BUN 56* 53*   CREATININE 4.8* 5.0*   ALKPHOS 97  --    ALT 32  --    AST 31  --    BILITOT 0.4  --

## 2024-01-05 NOTE — PLAN OF CARE
No acute events overnight. Wound care completed with complete CHG bath and linen change. TVP remains in place to J, backup rate set at 80bpm.       Problem: Diabetes Comorbidity  Goal: Blood Glucose Level Within Targeted Range  Outcome: Ongoing, Progressing     Problem: Fluid and Electrolyte Imbalance (Acute Kidney Injury/Impairment)  Goal: Fluid and Electrolyte Balance  Outcome: Ongoing, Progressing     Problem: Impaired Wound Healing  Goal: Optimal Wound Healing  Outcome: Ongoing, Progressing

## 2024-01-05 NOTE — BRIEF OP NOTE
Attending: Karl Marin MD  Date of Procedure: 01/05/24    Post-operative Diagnosis: CHB    Procedure Performed: Micra implantation    Description of Procedure: Patient presented to the EP lab in the fasting state. Prepped and draped in sterile fashion. Prophylactic antibiotics given. Safety time out performed. Sedation per anesthesia. Lidocaine for local anesthetic. Ultrasound guided right femoral vein access x1. Micra leadless pacemaker delivered via right femoral vein access into the right ventricle. Parameters checked with adequate sensing, threshold, and impedance. Sutures x 2 to right groin access site.    EBL: <10 mL    Specimens: none  Complications: no immediate    Plan:  Bedrest x 6 hours  Please notify EP for any evidence of pacemaker malfunction    The attending physician was present for entire duration of the procedure    Shalonda Smart MD, PGY7  Electrophysiology

## 2024-01-05 NOTE — PT/OT/SLP PROGRESS
Speech Language Pathology      Chato Bowie  MRN: [id number]    Patient not seen today secondary to  (NPO and CARIN for procedure upon attempt). Will follow-up 1/8.

## 2024-01-05 NOTE — PROGRESS NOTES
[facility] - [facility]  Cardiology  Progress Note    Patient Name: Chato Bowie  MRN: [id number]  Admission Date: 12/28/2023  Hospital Length of Stay: 8 days  Code Status: DNR   Attending Physician: Frantz Gross MD   Primary Care Physician: Irlanda Valenzuela -  Expected Discharge Date: 1/9/2024  Principal Problem:Complete heart block    Subjective:     Hospital Course:   Admitted for CHB which EP thought to be 2/2 uremia/ELIZABETH and infection. Has not required pacing and is HDS. Recommended holding AV iker agents. BUN and sCr newly elevated, seen by nephrology who started dialysis for clearance. Tolerated dialysis with plans to further dialyze. UCx growing GNR, on cefepime. Continued daptomycin for his BL heel OM. EP holding on PPM insertion for now, would like to see if he improves with dialysis; likely a better candidate for leadless pacemaker given his multiple infections. Consider ID consult for his multidrug resistant organisms and possible pyelonephritis to ensure good source control. Mildly delirious which could be 2/2 his uremia and infection, further assessing with CT head which was unremarkable. Stepped down to  but came back to CCU after he went into torsades on 12/31. CPR was initiated and Pt regained pulse and consciousness; bradycardic post event with HR ~50. Electrolytes replaced. TVP placed in CCU; spoke with SID who confirmed a DNR status but that she would want a pacemaker placed. EP consulted for PPM insertion. Had another episode of torsades (~ 2 mins) that became CHB and then was paced to NSR. TVP placed. EP planning for micra insertion once on abx for his UTI for 72 hours. Code status will need to be reversed to full for the procedure, attempting to contact jero to further discuss. Palliative care consulted for GOC/ACP. Pt more alerted and oriented, states he would like to proceed with the procedure, Micra placed on 1/5. Suitable for stepdown to hospital medicine.      Interval History: No major issues overnight. Feeling better, awaiting procedure. Denies pain left knee, XR knee unremarkable for effusion, fracture, etc     Review of Systems   Constitutional: Negative for chills and fever.   HENT: Negative.     Eyes:  Negative for blurred vision and pain.   Cardiovascular:  Negative for chest pain and leg swelling.   Respiratory:  Negative for cough, shortness of breath and wheezing.    Skin:  Positive for dry skin and poor wound healing. Negative for rash.   Musculoskeletal:  Negative for arthritis.   Gastrointestinal:  Negative for abdominal pain, nausea and vomiting.   Genitourinary:  Negative for dysuria, frequency and hematuria.   Neurological:  Negative for dizziness, headaches and light-headedness.   Psychiatric/Behavioral:  Negative for altered mental status. The patient is not nervous/anxious.      Objective:     Vital Signs (Most Recent):  Temp: 98.2 °F (36.8 °C) (01/05/24 1300)  Pulse: 60 (01/05/24 1300)  Resp: 19 (01/05/24 1300)  BP: (!) 157/70 (01/05/24 1300)  SpO2: 99 % (01/05/24 1300) Vital Signs (24h Range):  Temp:  [97.5 °F (36.4 °C)-98.7 °F (37.1 °C)] 98.2 °F (36.8 °C)  Pulse:  [44-80] 60  Resp:  [15-23] 19  SpO2:  [96 %-100 %] 99 %  BP: (123-187)/() 157/70     Weight: 80.7 kg (178 lb)  Body mass index is 24.83 kg/m².     SpO2: 99 %         Intake/Output Summary (Last 24 hours) at 1/5/2024 1339  Last data filed at 1/5/2024 1300  Gross per 24 hour   Intake 801.56 ml   Output 900 ml   Net -98.44 ml         Lines/Drains/Airways       Peripherally Inserted Central Catheter Line  Duration             PICC Double Lumen 12/07/23 1457 right brachial 28 days              Central Venous Catheter Line  Duration             Trialysis (Dialysis) Catheter 12/29/23 2117 right internal jugular 6 days     Introducer Single Lumen 01/01/24 0927 Internal Jugular Left 4 days              Drain  Duration                  Urethral Catheter 12/28/23 1600 Double-lumen 16 Fr. 7  days              Line  Duration                  Pacer Wires 01/01/24 0928 4 days                       Physical Exam  Vitals and nursing note reviewed.   Constitutional:       General: He is not in acute distress.     Appearance: He is ill-appearing.   HENT:      Mouth/Throat:      Mouth: Mucous membranes are dry.   Eyes:      Extraocular Movements: Extraocular movements intact.      Pupils: Pupils are equal, round, and reactive to light.   Cardiovascular:      Rate and Rhythm: Normal rate and regular rhythm.      Pulses: Normal pulses.      Heart sounds: Murmur (ejection systolic murmur) heard.   Pulmonary:      Effort: Pulmonary effort is normal.      Breath sounds: Normal breath sounds. No wheezing, rhonchi or rales.   Abdominal:      General: Bowel sounds are normal. There is no distension.      Palpations: Abdomen is soft.      Tenderness: There is no abdominal tenderness.   Musculoskeletal:         General: No tenderness.      Right lower leg: Edema (1+ pitting edema 1/3 way up shin) present.      Left lower leg: Edema (1+ pitting edema 1/3 way up shin) present.   Skin:     General: Skin is warm and dry.      Capillary Refill: Capillary refill takes less than 2 seconds.      Findings: No erythema or rash.   Neurological:      Mental Status: He is lethargic, disoriented and confused.            Significant Labs: CMP   Recent Labs   Lab 01/04/24  0414 01/05/24  0405 01/05/24  0920    140 140   K 3.4* 3.5 3.8    105 107   CO2 24 24 24   GLU 80 86 82   BUN 49* 56* 53*   CREATININE 4.2* 4.8* 5.0*   CALCIUM 8.3* 8.5* 8.4*   PROT 6.2 6.0  --    ALBUMIN 1.9* 1.8*  --    BILITOT 0.5 0.4  --    ALKPHOS 89 97  --    AST 29 31  --    ALT 34 32  --    ANIONGAP 10 11 9     , CBC   Recent Labs   Lab 01/04/24 0414 01/05/24  0405   WBC 7.42 8.45   HGB 8.0* 7.5*   HCT 26.2* 23.9*    240     , All pertinent lab results from the last 24 hours have been reviewed., and   Recent Lab Results          01/05/24  0920   01/05/24  0756   01/05/24  0405   01/05/24  0404        Albumin     1.8         ALP     97         ALT     32         Anion Gap 9     11         AST     31         Baso #     0.07         Basophil %     0.8         BILIRUBIN TOTAL     0.4  Comment: For infants and newborns, interpretation of results should be based  on gestational age, weight and in agreement with clinical  observations.    Premature Infant recommended reference ranges:  Up to 24 hours.............<8.0 mg/dL  Up to 48 hours............<12.0 mg/dL  3-5 days..................<15.0 mg/dL  6-29 days.................<15.0 mg/dL           BUN 53     56         Calcium 8.4     8.5         Chloride 107     105         CO2 24     24         Creatinine 5.0     4.8         Differential Method     Automated         eGFR 11.5     12.1         Eos #     0.3         Eosinophil %     3.1         Glucose 82     86         Gran # (ANC)     6.2         Gran %     72.9         Hematocrit     23.9         Hemoglobin     7.5         Immature Grans (Abs)     0.05  Comment: Mild elevation in immature granulocytes is non specific and   can be seen in a variety of conditions including stress response,   acute inflammation, trauma and pregnancy. Correlation with other   laboratory and clinical findings is essential.           Immature Granulocytes     0.6         Immunofix Interp.     SEE COMMENT  Comment: Serum protein electrophoresis and immunofixation results should be   interpreted in clinical context in that some therapeutic agents can   result   in false positive results (example, daratumumab). Correlation with   the   patient s therapeutic regimen is required.  See pathologist's interpretation.           Lymph #     0.9         Lymph %     11.1         Magnesium      2.6         MCH     25.2         MCHC     31.4         MCV     80         Mono #     1.0         Mono %     11.5         MPV     9.0         nRBC     0         Phosphorus Level     3.4          Platelet Count     240         POCT Glucose   90     97       Potassium 3.8     3.5         PROTEIN TOTAL     6.0         Protein, Serum     5.3  Comment: Serum protein electrophoresis and immunofixation results should be   interpreted in clinical context in that some therapeutic agents can   result   in false positive results (example, daratumumab). Correlation with   the   patient s therapeutic regimen is required.           RBC     2.98         RDW     18.2         Sodium 140     140         WBC     8.45               Significant Imaging:  All imaging from the past 24 hours has been reviewed  Assessment and Plan:     * Complete heart block  Patient with complete heart block, previous EKG with first degree AV block and LBBB  Patient on metoprolol and amiodarone as an outpatient  HR in the 30s on admission, had improved to ~ 45   EP consulted, thought it could be 2/2 uremia/ELIZABETH/infection; held off on TVP initially  Stepped down to  with plans to see how he improved with dialysis  However went into torsades on 12/31  TVP placed by IC; s/p Micra insertion with EP on 1/5    Torsades de pointes  Went into torsades overnight 12/31; HR went to 160-170, then Pt lost consciousness and pulse. CPR initiated, Pt regained consciousness and pulse then was transferred back to CCU  Received Mg and K although levels were not critical  Medications reviewed - no obvious meds that could be prolonging QTc  TVP placed by IC  Spoke with SID who confirmed DNR code status but she is happy for a PPM to be inserted  Had another episode of torsades on 1/1 for about 2 mins which then converted to CHB which he was paced out of into a HR ~ 65; spoke with EP who advised to overpace him  Remain on telemetry  Keep K > 4, Mg > 2  Will need to check with EP what other medications they may want given the TdP    Osteomyelitis of foot  Admitted on 11/27 from NH for b/l wounds. 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 Wound swab of R ulcer  with MRSA.   s/p bone biopsy and debridement by podiatry   On 6 weeks of IV abx Dapto with end date end of 1/10/2024    Advanced care planning/counseling discussion  Cardiology team spoke with SID over the phone who confirmed DNR status however advised that she would like a PPM inserted    Acute encephalopathy  Likely uremic encephalopathy in the setting of ARF with  and asterixis on exam. Infections (UTI and OM) could also be contributing  Remains disoriented but alertness improved, answers some questions appropriately. Oriented to place and month.   Currently able to protect airway but will continue to monitor  CT head unremarkable  Continue ertapenem for UTI  Continue daptomycin for OM  SLP advised for minced/moist diet    IMPROVING    Bradycardia  See CHB    UTI (urinary tract infection)  Suspect this patient has Complicated cystitis given retention and positive UA  UCx growing multi drug resistant E coli  ID following, started on ertapenem, anticipate 7 day course, last day 1/6    Paroxysmal atrial flutter  On eliquis at home. Unknown last dose  - Will hold off restating due to PPM insertion    Severe aortic valve stenosis  TTE:  Left Ventricle: The left ventricle is normal in size. Ventricular mass is normal. Normal wall thickness. There is concentric remodeling. Septal motion is consistent with bundle branch block. There is normal systolic function with a visually estimated ejection fraction of 55 - 60%. Grade II diastolic dysfunction.    Right Ventricle: Normal right ventricular cavity size. Systolic function is normal.    Left Atrium: Left atrium is severely dilated.    Aortic Valve: There is moderate aortic valve sclerosis. Moderately restricted motion. There is severe stenosis. Aortic valve area by VTI is 0.86 cm². Aortic valve peak velocity is 4.12 m/s. Mean gradient is 43 mmHg. The dimensionless index is 0.22. There is mild to moderate aortic regurgitation.    Mitral Valve: There is bileaflet  sclerosis. There is mild annular dilation present.    Tricuspid Valve: There is mild to moderate regurgitation.    Pulmonary Artery: The estimated pulmonary artery systolic pressure is 72 mmHg.    IVC/SVC: Intermediate venous pressure at 8 mmHg.    Acute renal failure superimposed on stage 3b chronic kidney disease  Admitted with ELIZABETH on CKD  Initial  with sCr 7.5; improving with dialysis  Nephrology consulted, started on HD for clearance  Still making some urine  Dialysis per nephro  Will need to establish longterm dialysis plan with nephro    GERD (gastroesophageal reflux disease)  Continue famotidine    Anemia  Likely multifactorial given CKD and infection. No signs of bleeding.  Has not received any PRBCs to date. Denies hematemesis, melena, hematochezia, other overt signs of bleeding     Current CBC reviewed-         Lab Results   Component Value Date     HGB 7.2 (L) 12/28/2023     HCT 22.9 (L) 12/28/2023      - Will obtain consent and transfuse if Hb < 7    Type 2 diabetes mellitus with kidney complication, with long-term current use of insulin  Last HbA1c 5.5  POCT TIDWM and prior to bed  LDSSI  Monitor BGLs, start long acting insulin if needed    Dyslipidemia  Continue statin    Epigastric abdominal pain  CTAP showing cholelithiasis, no evidence of acute cholecystitis    RESOLVED      VTE Risk Mitigation (From admission, onward)           Ordered     heparin (porcine) 2,000 Units in sodium chloride 0.9% 1,000 mL  As needed (PRN)         01/05/24 1025     heparin infusion 1,000 units/500 ml in 0.9% NaCl (on sterile field)  As needed (PRN)         01/05/24 1024     heparin (porcine) injection 1,000 Units  As needed (PRN)         12/30/23 1056     IP VTE HIGH RISK PATIENT  Once         12/28/23 1719     Place sequential compression device  Until discontinued         12/28/23 1719                  Marianna Cullen MD  Cardiology  [facility] - [facility]

## 2024-01-05 NOTE — PLAN OF CARE
Recommendation/Intervention:   1) Resume soft and bite sized diet with thin liquids once able, texture per SLP, encourage PO intake  2) Novasource Renal BID once PO diet resumed to promote adequate kcal/protein consumption  3) RD to monitor weight, labs, PO intake/tolerance, skin     Goals: Meet % EEN, EPN by RD f/u  Nutrition Goal Status: progressing towards goal  Communication of RD Recs:  (POC)

## 2024-01-05 NOTE — ASSESSMENT & PLAN NOTE
Patient with complete heart block, previous EKG with first degree AV block and LBBB  Patient on metoprolol and amiodarone as an outpatient  HR in the 30s on admission, had improved to ~ 45   EP consulted, thought it could be 2/2 uremia/ELIZABETH/infection; held off on TVP initially  Stepped down to  with plans to see how he improved with dialysis  However went into torsades on 12/31  TVP placed by IC; s/p Micra insertion with EP on 1/5

## 2024-01-05 NOTE — PLAN OF CARE
POC reviewed with Chato Bowie and family. Questions and concerns addressed. Patient transferred to  for leadless pacemaker 1000. Patient back in room, alert, awake, right femoral site looks clean and intact, no bleeding and no hematomas noted. Left cortus removed per MD order.    1530 - sutures removed from the right groin. No bleeding or hematoma noted. Applied gauze and tegaderm.    1605 - Patient BP 76/52, patient actively bleeding from the right groin with dressing fully saturated. Held pressure, CCU team notified and ordered 1L NS bolus and Levo after bolus is complete.    1610 - 1L NS bolus completed, Levo held per Savannah ALBARRAN order. BP stable at 109/55. MD ordered CBC, type&screen, and 2 Units RBC's.    1630 - MD obtained consent from niece for RBC's transfusion.    See below and flowsheets for full assessment and VS info.       Neuro:  Liliana Coma Scale  Best Eye Response: 4-->(E4) spontaneous  Best Motor Response: 6-->(M6) obeys commands  Best Verbal Response: 4-->(V4) confused  Liliana Coma Scale Score: 14  Assessment Qualifiers: patient not sedated/intubated  Pupil PERRLA: yes    24 hr Temp:  [97.5 °F (36.4 °C)-98.5 °F (36.9 °C)]     CV:  Rhythm: paced rhythm   DVT prophylaxis: VTE Required Core Measure: Pharmacological prophylaxis initiated/maintained                            Pulses  Right Radial Pulse: 2+ (normal), palpation  Left Radial Pulse: 2+ (normal), palpation  Right Dorsalis Pedis Pulse: 1+ (weak), palpation  Left Dorsalis Pedis Pulse: 1+ (weak), palpation  Right Posterior Tibial Pulse: 1+ (weak), palpation  Left Posterior Tibial Pulse: 1+ (weak), palpation    Resp:  Flow (L/min): 1       GI/:  GI prophylaxis: no  Diet/Nutrition Received: mechanical/dental soft  Last Bowel Movement: 01/04/24  Voiding Characteristics: urethral catheter (bladder)       Urethral Catheter 12/28/23 1600 Double-lumen 16 Fr.-Reason for Continuing Urinary Catheterization: Critically ill in ICU and requiring  "hourly monitoring of intake/output   Intake/Output Summary (Last 24 hours) at 1/5/2024 1758  Last data filed at 1/5/2024 1700  Gross per 24 hour   Intake 2118.75 ml   Output 790 ml   Net 1328.75 ml          Labs/Accuchecks:  Recent Labs   Lab 01/04/24  0414 01/05/24  0405 01/05/24  1611   WBC 7.42 8.45 7.30   RBC 3.20* 2.98* 2.96*   HGB 8.0* 7.5* 7.3*   HCT 26.2* 23.9* 23.4*    240 226    No results for input(s): "PT", "INR", "APTT" in the last 168 hours.   Recent Labs     01/05/24  0405 01/05/24  0920    140   K 3.5 3.8   CO2 24 24    107   BUN 56* 53*   CREATININE 4.8* 5.0*   ALKPHOS 97  --    ALT 32  --    AST 31  --    BILITOT 0.4  --        Recent Labs   Lab 01/01/24  0630 01/04/24  0912   CPK  --  155   TROPONINI 0.384*  --     No results for input(s): "PH", "PCO2", "PO2", "HCO3", "POCSATURATED", "BE" in the last 72 hours.    Electrolytes: No replacement orders  Accuchecks: ACHS    Gtts/LDAs:   sodium chloride 0.9% Stopped (01/05/24 1455)    NORepinephrine bitartrate-D5W Stopped (01/05/24 1615)       Lines/Drains/Airways       Peripherally Inserted Central Catheter Line  Duration             PICC Double Lumen 12/07/23 1457 right brachial 29 days              Central Venous Catheter Line  Duration             Trialysis (Dialysis) Catheter 12/29/23 2117 right internal jugular 6 days     Introducer Single Lumen 01/01/24 0927 Internal Jugular Left 4 days              Drain  Duration                  Urethral Catheter 12/28/23 1600 Double-lumen 16 Fr. 8 days              Line  Duration                  Pacer Wires 01/01/24 0928 4 days                    Skin/Wounds  Bathing/Skin Care: bath, complete;back care;dressed/undressed;electrode patches/site rotation;incontinence care;linen changed (01/04/24 1905)  Wounds: Yes  Wound care consulted: Yes    "

## 2024-01-05 NOTE — TRANSFER OF CARE
"Anesthesia Transfer of Care Note    Patient: Chato Bowie    Procedure(s) Performed: Procedure(s) (LRB):  INSERTION, CARDIAC PACEMAKER, LEADLESS (N/A)    Patient location: ICU    Anesthesia Type: general    Transport from OR: Transported from OR on room air with adequate spontaneous ventilation. Continuous ECG monitoring in transport. Continuous SpO2 monitoring in transport. Continuos invasive BP monitoring in transport    Post pain: adequate analgesia    Post assessment: no apparent anesthetic complications    Post vital signs: stable    Level of consciousness: awake and alert    Nausea/Vomiting: no nausea/vomiting    Complications: none    Transfer of care protocol was followed      Last vitals: Visit Vitals  /82   Pulse 50   Temp 36.9 °C (98.4 °F) (Oral)   Resp 18   Ht 5' 11" (1.803 m)   Wt 80.7 kg (178 lb)   SpO2 100%   BMI 24.83 kg/m²     "

## 2024-01-05 NOTE — PROGRESS NOTES
[facility] - Card[facility]  Adult Nutrition  Progress Note    SUMMARY     Recommendation/Intervention:   1) Resume soft and bite sized diet with thin liquids once able, texture per SLP, encourage PO intake  2) Novasource Renal BID once PO diet resumed to promote adequate kcal/protein consumption  3) RD to monitor weight, labs, PO intake/tolerance, skin    Goals: Meet % EEN, EPN by RD f/u  Nutrition Goal Status: progressing towards goal  Communication of RD Recs:  (POC)    Assessment and Plan    Endocrine  Severe malnutrition  Malnutrition Type:  Context: chronic illness  Level: severe    Related to (etiology):   Inadequate energy intake    Signs and Symptoms (as evidenced by):   Malnutrition Characteristic Summary:  Weight Loss (Malnutrition): other (see comments) (15% x 3 months)  Energy Intake (Malnutrition): less than 75% for greater than or equal to 3 months  Subcutaneous Fat (Malnutrition): mild depletion  Muscle Mass (Malnutrition): moderate depletion    Interventions/Recommendations (treatment strategy):  1) Resume soft and bite sized diet with thin liquids once able, texture per SLP, encourage PO intake  2) Novasource Renal BID once PO diet resumed to promote adequate kcal/protein consumption  3) RD to monitor weight, labs, PO intake/tolerance, skin    Nutrition Diagnosis Status:   Continues    Malnutrition Assessment  Malnutrition Context: chronic illness  Malnutrition Level: severe  Skin (Micronutrient): none  Nails (Micronutrient): none  Hair/Scalp (Micronutrient): none  Eyes (Micronutrient): none  Extraoral (Micronutrient): none  Lips/Mucous Membranes (Micronutrient): vertical cracks  Teeth (Micronutrient): none  Tongue (Micronutrient): none  Neck/Chest (Micronutrient): muscle wasting  Musculoskeletal/Lower Extremities: subcutaneous fat loss, muscle wasting       Weight Loss (Malnutrition): other (see comments) (15% x 3 months)  Energy Intake (Malnutrition): less than 75% for greater than or  "equal to 3 months  Subcutaneous Fat (Malnutrition): mild depletion  Muscle Mass (Malnutrition): moderate depletion   Orbital Region (Subcutaneous Fat Loss): mild depletion  Upper Arm Region (Subcutaneous Fat Loss): well nourished   Drury Region (Muscle Loss): moderate depletion  Clavicle Bone Region (Muscle Loss): moderate depletion  Clavicle and Acromion Bone Region (Muscle Loss): mild depletion  Dorsal Hand (Muscle Loss): moderate depletion  Patellar Region (Muscle Loss): moderate depletion  Anterior Thigh Region (Muscle Loss): mild depletion  Posterior Calf Region (Muscle Loss): mild depletion     Reason for Assessment    Reason For Assessment: RD follow-up  Diagnosis:  (complete heart block)  Relevant Medical History: HTN, dyslipidemia, anemia, T2DM, GERD, CKD 3b  Interdisciplinary Rounds: did not attend  General Information Comments: Pt followed by RD. Currently NPO for procedure. Previously was on soft and bite sized diet with thin liquids per SLP recs. Varied recent PO intake- 0, 50, 100% at meals. LBM yesterday 1/4.   Nutrition Discharge Planning: Pending medical course    Nutrition Risk Screen    Nutrition Risk Screen: no indicators present    Nutrition/Diet History    Spiritual, Cultural Beliefs, Jain Practices, Values that Affect Care: no    Anthropometrics    Temp: 98.4 °F (36.9 °C)  Height Method: Stated  Height: 5' 11" (180.3 cm)  Height (inches): 71 in  Weight Method: Standard Scale  Weight: 80.7 kg (178 lb)  Weight (lb): 178 lb  Ideal Body Weight (IBW), Male: 172 lb  % Ideal Body Weight, Male (lb): 103.49 %  BMI (Calculated): 24.8  BMI Grade: 18.5-24.9 - normal    Lab/Procedures/Meds    Pertinent Labs Reviewed: reviewed  Pertinent Labs Comments: Hgb: 7.5, Hct: 23.9, BUN: 56, Creatinine: 4.8, eGFR: 12.1, Calcium: 8.5  Pertinent Medications Reviewed: reviewed   acetaminophen  1,000 mg Oral Q8H    allopurinoL  50 mg Oral Daily    atorvastatin  80 mg Oral Daily    DAPTOmycin (CUBICIN) IV (PEDS and " ADULTS)  8 mg/kg Intravenous Q48H    ertapenem (INVANZ) IVPB  500 mg Intravenous Q24H    famotidine  20 mg Oral Daily    folic acid  1 mg Oral Daily    LIDOcaine  1 patch Transdermal Q24H    mupirocin   Nasal BID     Estimated/Assessed Needs    Weight Used For Calorie Calculations: 80.7 kg (178 lb)  Energy Calorie Requirements (kcal): 1572-7009 kcal  Energy Need Method: Kcal/kg (25-30)  Protein Requirements: 81-97g (1-1.2g/kg)  Weight Used For Protein Calculations: 80.7 kg (178 lb)  Fluid Requirements (mL): 1ml/kcal or per MD  Estimated Fluid Requirement Method: RDA Method  RDA Method (mL): 2018  CHO Requirement: 252 - 303g    Nutrition Prescription Ordered    Current Diet Order: NPO for procedure  Nutrition Order Comments: Previously on soft and bite sized diet  Oral Nutrition Supplement: None ordered at this time    Evaluation of Received Nutrient/Fluid Intake    I/O: + 774 mL since admit  Energy Calories Required: not meeting needs  Protein Required: not meeting needs  Fluid Required:  (As per MD)  Comments: LBM 1/4  Tolerance: tolerating  % Intake of Estimated Energy Needs: 0 - 25 %  % Meal Intake: NPO    Nutrition Risk    Level of Risk/Frequency of Follow-up:  (Follow-up 1x/week)     Monitor and Evaluation    Food and Nutrient Intake: energy intake, food and beverage intake  Food and Nutrient Adminstration: diet order  Knowledge/Beliefs/Attitudes: food and nutrition knowledge/skill  Physical Activity and Function: nutrition-related ADLs and IADLs  Anthropometric Measurements: height/length, weight, weight change, body mass index  Biochemical Data, Medical Tests and Procedures: gastrointestinal profile, electrolyte and renal panel, inflammatory profile, glucose/endocrine profile, lipid profile  Nutrition-Focused Physical Findings: overall appearance     Nutrition Follow-Up    RD Follow-up?: Yes

## 2024-01-05 NOTE — ASSESSMENT & PLAN NOTE
Likely multifactorial given CKD and infection. No signs of bleeding.  Has not received any PRBCs to date. Denies hematemesis, melena, hematochezia, other overt signs of bleeding     Current CBC reviewed-         Lab Results   Component Value Date     HGB 7.2 (L) 12/28/2023     HCT 22.9 (L) 12/28/2023      - Will obtain consent and transfuse if Hb < 7

## 2024-01-05 NOTE — ANESTHESIA PREPROCEDURE EVALUATION
01/05/2024  Chato Bowie is a 73 y.o., male.  Patient Active Problem List   Diagnosis    Abnormal EKG    Epigastric abdominal pain    Abdominal pain    Fever    Intractable vomiting with nausea    ARF (acute renal failure)    Dyslipidemia    Type 2 diabetes mellitus with kidney complication, with long-term current use of insulin    Anemia    GERD (gastroesophageal reflux disease)    Hypokalemia    Alcohol abuse    Left knee pain    NSTEMI (non-ST elevated myocardial infarction)    Acute renal failure superimposed on stage 3b chronic kidney disease    Hypomagnesemia    BPH (benign prostatic hyperplasia)    Severe aortic valve stenosis    Chest pain    Paroxysmal atrial flutter    Acute on chronic diastolic congestive heart failure    Elevated serum creatinine    CKD (chronic kidney disease), stage IV    Fracture of one rib, right side, initial encounter for closed fracture    Acute on chronic diastolic (congestive) heart failure    UTI (urinary tract infection)    Anasarca    Severe malnutrition    Bradycardia    Acute gout of left knee    Debility Due to left knee pain and effusion due to gout? and/or injury    Multiple thyroid nodules    Overweight (BMI 25.0-29.9)    Elevated troponin    Skin rash    Acute encephalopathy    Falls    COVID-19 virus infection    Multiple wounds    Effusion of left knee    Discharge planning issues    Advanced care planning/counseling discussion    Left atrial mass    Urinary retention    Chronic diastolic heart failure    Atherosclerosis of native artery of extremity    Osteomyelitis of foot    Acute delirium    Weakness of left leg    Critical limb ischemia of both lower extremities    Complete heart block    Torsades de pointes    Palliative care encounter    Irritant contact dermatitis due to fecal, urinary or dual incontinence           Pre-op Assessment          Review  of Systems      Physical Exam    Airway:  No airway management difficulties anticipated  Dental:  No active dental issues noted  Chest/Lungs:  Clear to auscultation    Heart:  Rate: Normal  Rhythm: Regular Rhythm  Sounds: Normal        Anesthesia Plan  Type of Anesthesia, risks & benefits discussed:    Anesthesia Type: Gen Supraglottic Airway, Gen ETT, Gen Natural Airway  Informed Consent: Informed consent signed with the Patient representative and all parties understand the risks and agree with anesthesia plan.  All questions answered.   ASA Score: 3  Anesthesia Plan Notes: Chart reviewed. Patient seen and examined. Anesthesia plan discussed and questions answered. E-consent signed. Jose Rafael Du MD    Ready For Surgery From Anesthesia Perspective.     .

## 2024-01-05 NOTE — PROGRESS NOTES
[facility] - Card[facility]  Nephrology  Progress Note    Patient Name: Chato Bowie  MRN: [id number]  Admission Date: 12/28/2023  Hospital Length of Stay: 8 days  Attending Provider: Frantz Gross MD   Primary Care Physician: Irlanda Valenzuela -  Principal Problem:Complete heart block    Subjective:     HPI: 73 year old man with a history of longstanding T2DM, HTN, HFpEF, severe aortic stenosis, chronic bradycardia, CKD3b, newly diagnosed bilateral heel osteo on daptomycin admitted from nursing home for electrolyte abnormalities, bilateral flank pain, leg pain. Per report NH obtained labs had had a BUN of 170 for which he was transferred to [facility] for further evaluation. In the ED the BUN of 170 was confirmed along with a creatinine of 7.5 (baseline creatinine 2-2.5 as of 2 weeks ago). Of note, K is 3.6 with CO2 of 23. A jackson was placed with 500 purulent urine out and per family over phone had been dealing with some prostate issues in the past. UA revealed a specific gravity of 1.010, pH of 6, 2+ protein (in setting of ELIZABETH), 3+ leukocytes with many bacteria and WBC clumps.     Nephrology consulted for ELIZABETH and possible dialysis needs    Interval History:   Electrolytes stable, non-emergent this morning.  840 ml of UOP over the past 24 hours.  Plans for pacemaker placement today.    Oxygenating well on RA this morning.    Review of patient's allergies indicates:  No Known Allergies  Current Facility-Administered Medications   Medication Frequency    0.9%  NaCl infusion (for blood administration) Q24H PRN    0.9%  NaCl infusion Continuous    0.9%  NaCl infusion PRN    acetaminophen tablet 1,000 mg Q8H    allopurinol split tablet 50 mg Daily    atorvastatin tablet 80 mg Daily    benzonatate capsule 100 mg TID PRN    DAPTOmycin (CUBICIN) 650 mg in sodium chloride 0.9% SolP 50 mL IVPB Q48H    dextrose 10% bolus 125 mL 125 mL PRN    dextrose 10% bolus 250 mL 250 mL PRN    ertapenem (INVANZ) 500 mg in sodium  chloride 0.9% 100 mL IVPB Q24H    famotidine tablet 20 mg Daily    folic acid tablet 1 mg Daily    glucagon (human recombinant) injection 1 mg PRN    glucose chewable tablet 16 g PRN    glucose chewable tablet 24 g PRN    heparin (porcine) 2,000 Units in sodium chloride 0.9% 1,000 mL PRN    heparin (porcine) injection 1,000 Units PRN    heparin infusion 1,000 units/500 ml in 0.9% NaCl (on sterile field) PRN    HYDROmorphone injection 0.5 mg Q6H PRN    insulin aspart U-100 pen 0-5 Units QID (AC + HS) PRN    LIDOcaine 5 % patch 1 patch Q24H    LIDOcaine HCL 20 mg/ml (2%) injection PRN    magnesium oxide tablet 800 mg PRN    methocarbamoL tablet 500 mg QID PRN    mupirocin 2 % ointment BID    naloxone 0.4 mg/mL injection 0.02 mg PRN    oxyCODONE immediate release tablet 5 mg Q6H PRN    senna-docusate 8.6-50 mg per tablet 2 tablet BID PRN    sodium chloride 0.9% bolus 250 mL 250 mL PRN    sodium chloride 0.9% flush 10 mL Q12H PRN       Objective:     Vital Signs (Most Recent):  Temp: 98.2 °F (36.8 °C) (01/05/24 1300)  Pulse: 60 (01/05/24 1300)  Resp: 19 (01/05/24 1300)  BP: (!) 157/70 (01/05/24 1300)  SpO2: 99 % (01/05/24 1300) Vital Signs (24h Range):  Temp:  [97.5 °F (36.4 °C)-98.7 °F (37.1 °C)] 98.2 °F (36.8 °C)  Pulse:  [44-80] 60  Resp:  [15-23] 19  SpO2:  [96 %-100 %] 99 %  BP: (123-187)/() 157/70     Weight: 80.7 kg (178 lb) (12/29/23 0703)  Body mass index is 24.83 kg/m².  Body surface area is 2.01 meters squared.    I/O last 3 completed shifts:  In: 1329.9 [P.O.:960; I.V.:369.9]  Out: 1140 [Urine:1140]     Physical Exam  Vitals and nursing note reviewed.   Constitutional:       General: He is not in acute distress.     Appearance: He is ill-appearing. He is not toxic-appearing.      Interventions: Nasal cannula in place.   HENT:      Head: Normocephalic and atraumatic.      Nose: Nose normal.      Mouth/Throat:      Mouth: Mucous membranes are dry.      Pharynx: No oropharyngeal exudate.   Eyes:       General: No scleral icterus.        Right eye: No discharge.         Left eye: No discharge.   Cardiovascular:      Rate and Rhythm: Normal rate.   Pulmonary:      Effort: Pulmonary effort is normal. No respiratory distress.      Breath sounds: No wheezing or rales.   Abdominal:      General: Abdomen is flat.   Musculoskeletal:      Cervical back: Normal range of motion. No rigidity.      Right lower leg: No edema.      Left lower leg: Edema present.   Lymphadenopathy:      Cervical: No cervical adenopathy.   Neurological:      Mental Status: He is alert and easily aroused.      Comments: Alert, slow to respond   Psychiatric:         Behavior: Behavior is cooperative.          Significant Labs:  CBC:   Recent Labs   Lab 01/05/24  0405   WBC 8.45   RBC 2.98*   HGB 7.5*   HCT 23.9*      MCV 80*   MCH 25.2*   MCHC 31.4*     CMP:   Recent Labs   Lab 01/05/24 0405 01/05/24  0920   GLU 86 82   CALCIUM 8.5* 8.4*   ALBUMIN 1.8*  --    PROT 6.0  --     140   K 3.5 3.8   CO2 24 24    107   BUN 56* 53*   CREATININE 4.8* 5.0*   ALKPHOS 97  --    ALT 32  --    AST 31  --    BILITOT 0.4  --         Assessment/Plan:     Cardiac/Vascular  * Complete heart block  - defer to primary team     Renal/  Acute renal failure superimposed on stage 3b chronic kidney disease  Baseline creatinine 2-2.5 (longstanding DM with bilateral osteomyelitis, HTN)  Multiple comorbitites as well     On admission serum creatinine 7.5  ELIZABETH appears to be multifactorial, possible prolonged pre-renal state/ATN from hemodynamic changes from CHB vs progressive CKD.   Obstruction relieved by jackson placed 12/28/23 with 500 cc of purulent UOP. Supposedly follows with urology in outpatient setting. Is on tamsulosin.   Pyelonephritis with UA showing 3+ leuks with many bacteria. History of proteus in past.   Low effective circulating volume suggested by physical exam, evidence of acute liver injury in setting of HFpEF with severe aortic stenosis.  Home medication included metolazone 1 mg bid     Dialysis consent obtained 12/28/23  Renal US with no hydronephrosis   UCPR: 2.05  Azeem 22  Microalbumin / creatinine ratio: 647.3     Plan/Recommendation  -No HD plans today. Will monitor for renal recovery.        -Continue jackson and monitor UOP  -Strict ins and outs  -Avoid nephrotoxic agents if possible and renally dose medications  -Avoid drastic hemodynamic changes if possible      Tico Hicks NP  Nephrology  [facility] - [facility]

## 2024-01-05 NOTE — ASSESSMENT & PLAN NOTE
Baseline creatinine 2-2.5 (longstanding DM with bilateral osteomyelitis, HTN)  Multiple comorbitites as well     On admission serum creatinine 7.5  ELIZABETH appears to be multifactorial, possible prolonged pre-renal state/ATN from hemodynamic changes from CHB vs progressive CKD.   Obstruction relieved by jackson placed 12/28/23 with 500 cc of purulent UOP. Supposedly follows with urology in outpatient setting. Is on tamsulosin.   Pyelonephritis with UA showing 3+ leuks with many bacteria. History of proteus in past.   Low effective circulating volume suggested by physical exam, evidence of acute liver injury in setting of HFpEF with severe aortic stenosis. Home medication included metolazone 1 mg bid     Dialysis consent obtained 12/28/23  Renal US with no hydronephrosis   UCPR: 2.05  Azeem 22  Microalbumin / creatinine ratio: 647.3     Plan/Recommendation  -No HD plans today. Will monitor for renal recovery.        -Continue jackson and monitor UOP  -Strict ins and outs  -Avoid nephrotoxic agents if possible and renally dose medications  -Avoid drastic hemodynamic changes if possible   What Type Of Note Output Would You Prefer (Optional)?: Standard Output How Severe Is Your Skin Lesion?: mild Has Your Skin Lesion Been Treated?: not been treated Is This A New Presentation, Or A Follow-Up?: Skin Lesion

## 2024-01-05 NOTE — ASSESSMENT & PLAN NOTE
Went into torsades overnight 12/31; HR went to 160-170, then Pt lost consciousness and pulse. CPR initiated, Pt regained consciousness and pulse then was transferred back to CCU  Received Mg and K although levels were not critical  Medications reviewed - no obvious meds that could be prolonging QTc  TVP placed by IC  Spoke with MPOA who confirmed DNR code status but she is happy for a PPM to be inserted  Had another episode of torsades on 1/1 for about 2 mins which then converted to CHB which he was paced out of into a HR ~ 65; spoke with EP who advised to overpace him  Remain on telemetry  Keep K > 4, Mg > 2  Will need to check with EP what other medications they may want given the TdP

## 2024-01-05 NOTE — ASSESSMENT & PLAN NOTE
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 Infections (UTI and OM) could also be contributing  Remains disoriented but alertness improved, answers some questions appropriately. Oriented to place and month.   Currently able to protect airway but will continue to monitor  CT head unremarkable  Continue ertapenem for UTI  Continue daptomycin for OM  SLP advised for minced/moist diet    IMPROVING

## 2024-01-05 NOTE — ASSESSMENT & PLAN NOTE
Admitted with ELIZABETH on CKD  Initial  with sCr 7.5; improving with dialysis  Nephrology consulted, started on HD for clearance  Still making some urine  Dialysis per nephro  Will need to establish longterm dialysis plan with nephro

## 2024-01-05 NOTE — ANESTHESIA POSTPROCEDURE EVALUATION
Anesthesia Post Evaluation    Patient: Chato Bowie    Procedure(s) Performed: Procedure(s) (LRB):  INSERTION, CARDIAC PACEMAKER, LEADLESS (N/A)    Final Anesthesia Type: general      Level of consciousness: awake and alert  Post-procedure vital signs: reviewed and stable  Pain control: Pain has been treated.  Airway patency: patent    PONV status: Absent or treated.  Anesthetic complications: no      Cardiovascular status: hemodynamically stable  Respiratory status: unassisted  Hydration status: euvolemic                Vitals Value Taken Time   /71 01/05/24 1245   Temp 36.9 °C (98.4 °F) 01/05/24 1245   Pulse 60 01/05/24 1257   Resp 14 01/05/24 1257   SpO2 100 % 01/05/24 1257   Vitals shown include unvalidated device data.      No case tracking events are documented in the log.      Pain/Avery Score: Pain Rating Prior to Med Admin: 0 (1/5/2024  6:15 AM)  Pain Rating Post Med Admin: 0 (1/5/2024  7:15 AM)

## 2024-01-05 NOTE — CARE UPDATE
CCU team received phone call about Pt bleeding from groin site - BP 80s/50s  Bleeding stopped with pressure prior to team arrival  Levophed ordered but BP improved with NaCl bolus - will give 1L NaCl   Type and screen, CBC, and 2x PRBC ordered (given Hb 7.5 earlier today)  New dressing and sandbag applied  Eliquis ordered but not yet started - discontinued and will hold for now  Will repeat CBC this PM    Marianna Cullen MD  Internal Medicine PGY-3

## 2024-01-05 NOTE — SUBJECTIVE & OBJECTIVE
Interval History: No major issues overnight. Feeling better, awaiting procedure. Denies pain left knee, XR knee unremarkable for effusion, fracture, etc     Review of Systems   Constitutional: Negative for chills and fever.   HENT: Negative.     Eyes:  Negative for blurred vision and pain.   Cardiovascular:  Negative for chest pain and leg swelling.   Respiratory:  Negative for cough, shortness of breath and wheezing.    Skin:  Positive for dry skin and poor wound healing. Negative for rash.   Musculoskeletal:  Negative for arthritis.   Gastrointestinal:  Negative for abdominal pain, nausea and vomiting.   Genitourinary:  Negative for dysuria, frequency and hematuria.   Neurological:  Negative for dizziness, headaches and light-headedness.   Psychiatric/Behavioral:  Negative for altered mental status. The patient is not nervous/anxious.      Objective:     Vital Signs (Most Recent):  Temp: 98.2 °F (36.8 °C) (01/05/24 1300)  Pulse: 60 (01/05/24 1300)  Resp: 19 (01/05/24 1300)  BP: (!) 157/70 (01/05/24 1300)  SpO2: 99 % (01/05/24 1300) Vital Signs (24h Range):  Temp:  [97.5 °F (36.4 °C)-98.7 °F (37.1 °C)] 98.2 °F (36.8 °C)  Pulse:  [44-80] 60  Resp:  [15-23] 19  SpO2:  [96 %-100 %] 99 %  BP: (123-187)/() 157/70     Weight: 80.7 kg (178 lb)  Body mass index is 24.83 kg/m².     SpO2: 99 %         Intake/Output Summary (Last 24 hours) at 1/5/2024 1339  Last data filed at 1/5/2024 1300  Gross per 24 hour   Intake 801.56 ml   Output 900 ml   Net -98.44 ml         Lines/Drains/Airways       Peripherally Inserted Central Catheter Line  Duration             PICC Double Lumen 12/07/23 1457 right brachial 28 days              Central Venous Catheter Line  Duration             Trialysis (Dialysis) Catheter 12/29/23 2117 right internal jugular 6 days     Introducer Single Lumen 01/01/24 0927 Internal Jugular Left 4 days              Drain  Duration                  Urethral Catheter 12/28/23 1600 Double-lumen 16 Fr. 7 days               Line  Duration                  Pacer Wires 01/01/24 0928 4 days                       Physical Exam  Vitals and nursing note reviewed.   Constitutional:       General: He is not in acute distress.     Appearance: He is ill-appearing.   HENT:      Mouth/Throat:      Mouth: Mucous membranes are dry.   Eyes:      Extraocular Movements: Extraocular movements intact.      Pupils: Pupils are equal, round, and reactive to light.   Cardiovascular:      Rate and Rhythm: Normal rate and regular rhythm.      Pulses: Normal pulses.      Heart sounds: Murmur (ejection systolic murmur) heard.   Pulmonary:      Effort: Pulmonary effort is normal.      Breath sounds: Normal breath sounds. No wheezing, rhonchi or rales.   Abdominal:      General: Bowel sounds are normal. There is no distension.      Palpations: Abdomen is soft.      Tenderness: There is no abdominal tenderness.   Musculoskeletal:         General: No tenderness.      Right lower leg: Edema (1+ pitting edema 1/3 way up shin) present.      Left lower leg: Edema (1+ pitting edema 1/3 way up shin) present.   Skin:     General: Skin is warm and dry.      Capillary Refill: Capillary refill takes less than 2 seconds.      Findings: No erythema or rash.   Neurological:      Mental Status: He is lethargic, disoriented and confused.            Significant Labs: CMP   Recent Labs   Lab 01/04/24 0414 01/05/24 0405 01/05/24 0920    140 140   K 3.4* 3.5 3.8    105 107   CO2 24 24 24   GLU 80 86 82   BUN 49* 56* 53*   CREATININE 4.2* 4.8* 5.0*   CALCIUM 8.3* 8.5* 8.4*   PROT 6.2 6.0  --    ALBUMIN 1.9* 1.8*  --    BILITOT 0.5 0.4  --    ALKPHOS 89 97  --    AST 29 31  --    ALT 34 32  --    ANIONGAP 10 11 9     , CBC   Recent Labs   Lab 01/04/24 0414 01/05/24 0405   WBC 7.42 8.45   HGB 8.0* 7.5*   HCT 26.2* 23.9*    240     , All pertinent lab results from the last 24 hours have been reviewed., and   Recent Lab Results         01/05/24 0920    01/05/24  0756   01/05/24  0405   01/05/24  0404        Albumin     1.8         ALP     97         ALT     32         Anion Gap 9     11         AST     31         Baso #     0.07         Basophil %     0.8         BILIRUBIN TOTAL     0.4  Comment: For infants and newborns, interpretation of results should be based  on gestational age, weight and in agreement with clinical  observations.    Premature Infant recommended reference ranges:  Up to 24 hours.............<8.0 mg/dL  Up to 48 hours............<12.0 mg/dL  3-5 days..................<15.0 mg/dL  6-29 days.................<15.0 mg/dL           BUN 53     56         Calcium 8.4     8.5         Chloride 107     105         CO2 24     24         Creatinine 5.0     4.8         Differential Method     Automated         eGFR 11.5     12.1         Eos #     0.3         Eosinophil %     3.1         Glucose 82     86         Gran # (ANC)     6.2         Gran %     72.9         Hematocrit     23.9         Hemoglobin     7.5         Immature Grans (Abs)     0.05  Comment: Mild elevation in immature granulocytes is non specific and   can be seen in a variety of conditions including stress response,   acute inflammation, trauma and pregnancy. Correlation with other   laboratory and clinical findings is essential.           Immature Granulocytes     0.6         Immunofix Interp.     SEE COMMENT  Comment: Serum protein electrophoresis and immunofixation results should be   interpreted in clinical context in that some therapeutic agents can   result   in false positive results (example, daratumumab). Correlation with   the   patient s therapeutic regimen is required.  See pathologist's interpretation.           Lymph #     0.9         Lymph %     11.1         Magnesium      2.6         MCH     25.2         MCHC     31.4         MCV     80         Mono #     1.0         Mono %     11.5         MPV     9.0         nRBC     0         Phosphorus Level     3.4         Platelet  Count     240         POCT Glucose   90     97       Potassium 3.8     3.5         PROTEIN TOTAL     6.0         Protein, Serum     5.3  Comment: Serum protein electrophoresis and immunofixation results should be   interpreted in clinical context in that some therapeutic agents can   result   in false positive results (example, daratumumab). Correlation with   the   patient s therapeutic regimen is required.           RBC     2.98         RDW     18.2         Sodium 140     140         WBC     8.45               Significant Imaging:  All imaging from the past 24 hours has been reviewed

## 2024-01-06 LAB
ALBUMIN SERPL BCP-MCNC: 1.8 G/DL (ref 3.5–5.2)
ALP SERPL-CCNC: 80 U/L (ref 55–135)
ALT SERPL W/O P-5'-P-CCNC: 17 U/L (ref 10–44)
ANION GAP SERPL CALC-SCNC: 9 MMOL/L (ref 8–16)
AST SERPL-CCNC: 26 U/L (ref 10–40)
BASOPHILS # BLD AUTO: 0.1 K/UL (ref 0–0.2)
BASOPHILS NFR BLD: 1 % (ref 0–1.9)
BILIRUB SERPL-MCNC: 0.5 MG/DL (ref 0.1–1)
BLD PROD TYP BPU: NORMAL
BLOOD UNIT EXPIRATION DATE: NORMAL
BLOOD UNIT TYPE CODE: 7300
BLOOD UNIT TYPE: NORMAL
BUN SERPL-MCNC: 59 MG/DL (ref 8–23)
CALCIUM SERPL-MCNC: 8.2 MG/DL (ref 8.7–10.5)
CHLORIDE SERPL-SCNC: 108 MMOL/L (ref 95–110)
CO2 SERPL-SCNC: 24 MMOL/L (ref 23–29)
CODING SYSTEM: NORMAL
CREAT SERPL-MCNC: 5.1 MG/DL (ref 0.5–1.4)
CROSSMATCH INTERPRETATION: NORMAL
DIFFERENTIAL METHOD BLD: ABNORMAL
DISPENSE STATUS: NORMAL
EOSINOPHIL # BLD AUTO: 0.2 K/UL (ref 0–0.5)
EOSINOPHIL NFR BLD: 2.3 % (ref 0–8)
ERYTHROCYTE [DISTWIDTH] IN BLOOD BY AUTOMATED COUNT: 17.5 % (ref 11.5–14.5)
EST. GFR  (NO RACE VARIABLE): 11.2 ML/MIN/1.73 M^2
GLUCOSE SERPL-MCNC: 104 MG/DL (ref 70–110)
HCT VFR BLD AUTO: 23.2 % (ref 40–54)
HGB BLD-MCNC: 6.8 G/DL (ref 14–18)
HGB BLD-MCNC: 7.3 G/DL (ref 14–18)
HGB BLD-MCNC: 7.7 G/DL (ref 14–18)
IMM GRANULOCYTES # BLD AUTO: 0.09 K/UL (ref 0–0.04)
IMM GRANULOCYTES NFR BLD AUTO: 0.9 % (ref 0–0.5)
LYMPHOCYTES # BLD AUTO: 1.1 K/UL (ref 1–4.8)
LYMPHOCYTES NFR BLD: 10.9 % (ref 18–48)
MAGNESIUM SERPL-MCNC: 2.4 MG/DL (ref 1.6–2.6)
MCH RBC QN AUTO: 26.2 PG (ref 27–31)
MCHC RBC AUTO-ENTMCNC: 31.5 G/DL (ref 32–36)
MCV RBC AUTO: 83 FL (ref 82–98)
MONOCYTES # BLD AUTO: 1 K/UL (ref 0.3–1)
MONOCYTES NFR BLD: 9.8 % (ref 4–15)
NEUTROPHILS # BLD AUTO: 7.4 K/UL (ref 1.8–7.7)
NEUTROPHILS NFR BLD: 75.1 % (ref 38–73)
NRBC BLD-RTO: 0 /100 WBC
NUM UNITS TRANS PACKED RBC: NORMAL
PHOSPHATE SERPL-MCNC: 3.9 MG/DL (ref 2.7–4.5)
PLATELET # BLD AUTO: 209 K/UL (ref 150–450)
PMV BLD AUTO: 9.4 FL (ref 9.2–12.9)
POCT GLUCOSE: 129 MG/DL (ref 70–110)
POTASSIUM SERPL-SCNC: 4.1 MMOL/L (ref 3.5–5.1)
PROT SERPL-MCNC: 5.6 G/DL (ref 6–8.4)
RBC # BLD AUTO: 2.79 M/UL (ref 4.6–6.2)
SODIUM SERPL-SCNC: 141 MMOL/L (ref 136–145)
WBC # BLD AUTO: 9.91 K/UL (ref 3.9–12.7)

## 2024-01-06 PROCEDURE — 20600001 HC STEP DOWN PRIVATE ROOM

## 2024-01-06 PROCEDURE — 86920 COMPATIBILITY TEST SPIN: CPT | Performed by: HOSPITALIST

## 2024-01-06 PROCEDURE — 25000003 PHARM REV CODE 250

## 2024-01-06 PROCEDURE — 36430 TRANSFUSION BLD/BLD COMPNT: CPT

## 2024-01-06 PROCEDURE — 85025 COMPLETE CBC W/AUTO DIFF WBC: CPT | Performed by: INTERNAL MEDICINE

## 2024-01-06 PROCEDURE — 25000003 PHARM REV CODE 250: Performed by: INTERNAL MEDICINE

## 2024-01-06 PROCEDURE — 83735 ASSAY OF MAGNESIUM: CPT | Performed by: INTERNAL MEDICINE

## 2024-01-06 PROCEDURE — P9016 RBC LEUKOCYTES REDUCED: HCPCS | Performed by: HOSPITALIST

## 2024-01-06 PROCEDURE — 85018 HEMOGLOBIN: CPT | Mod: 91 | Performed by: HOSPITALIST

## 2024-01-06 PROCEDURE — 25000003 PHARM REV CODE 250: Performed by: STUDENT IN AN ORGANIZED HEALTH CARE EDUCATION/TRAINING PROGRAM

## 2024-01-06 PROCEDURE — 80053 COMPREHEN METABOLIC PANEL: CPT | Performed by: INTERNAL MEDICINE

## 2024-01-06 PROCEDURE — 27000207 HC ISOLATION

## 2024-01-06 PROCEDURE — 84100 ASSAY OF PHOSPHORUS: CPT | Performed by: INTERNAL MEDICINE

## 2024-01-06 PROCEDURE — 63600175 PHARM REV CODE 636 W HCPCS

## 2024-01-06 PROCEDURE — 63600175 PHARM REV CODE 636 W HCPCS: Performed by: INTERNAL MEDICINE

## 2024-01-06 PROCEDURE — 94761 N-INVAS EAR/PLS OXIMETRY MLT: CPT

## 2024-01-06 RX ORDER — HYDROCODONE BITARTRATE AND ACETAMINOPHEN 500; 5 MG/1; MG/1
TABLET ORAL
Status: DISCONTINUED | OUTPATIENT
Start: 2024-01-06 | End: 2024-01-12

## 2024-01-06 RX ADMIN — FOLIC ACID 1 MG: 1 TABLET ORAL at 08:01

## 2024-01-06 RX ADMIN — ATORVASTATIN CALCIUM 80 MG: 40 TABLET, FILM COATED ORAL at 08:01

## 2024-01-06 RX ADMIN — FAMOTIDINE 20 MG: 20 TABLET, FILM COATED ORAL at 08:01

## 2024-01-06 RX ADMIN — ALLOPURINOL 50 MG: 300 TABLET ORAL at 08:01

## 2024-01-06 RX ADMIN — DAPTOMYCIN 650 MG: 350 INJECTION, POWDER, LYOPHILIZED, FOR SOLUTION INTRAVENOUS at 12:01

## 2024-01-06 RX ADMIN — ERTAPENEM 500 MG: 1 INJECTION INTRAMUSCULAR; INTRAVENOUS at 01:01

## 2024-01-06 RX ADMIN — MUPIROCIN: 20 OINTMENT TOPICAL at 08:01

## 2024-01-06 NOTE — PLAN OF CARE
Problem: Infection  Goal: Absence of Infection Signs and Symptoms  Outcome: Ongoing, Progressing     Problem: Adult Inpatient Plan of Care  Goal: Plan of Care Review  Outcome: Ongoing, Progressing  Goal: Absence of Hospital-Acquired Illness or Injury  Outcome: Ongoing, Progressing  Goal: Optimal Comfort and Wellbeing  Outcome: Ongoing, Progressing  Goal: Readiness for Transition of Care  Outcome: Ongoing, Progressing     Problem: Device-Related Complication Risk (Hemodialysis)  Goal: Safe, Effective Therapy Delivery  Outcome: Ongoing, Progressing     Problem: Hemodynamic Instability (Hemodialysis)  Goal: Effective Tissue Perfusion  Outcome: Ongoing, Progressing     Problem: Infection (Hemodialysis)  Goal: Absence of Infection Signs and Symptoms  Outcome: Ongoing, Progressing     Problem: Diabetes Comorbidity  Goal: Blood Glucose Level Within Targeted Range  Outcome: Ongoing, Progressing     Problem: Fluid and Electrolyte Imbalance (Acute Kidney Injury/Impairment)  Goal: Fluid and Electrolyte Balance  Outcome: Ongoing, Progressing     Problem: Renal Function Impairment (Acute Kidney Injury/Impairment)  Goal: Effective Renal Function  Outcome: Ongoing, Progressing     Problem: Impaired Wound Healing  Goal: Optimal Wound Healing  Outcome: Ongoing, Progressing     Problem: Skin Injury Risk Increased  Goal: Skin Health and Integrity  Outcome: Ongoing, Progressing     Problem: Coping Ineffective  Goal: Effective Coping  Outcome: Ongoing, Progressing

## 2024-01-06 NOTE — NURSING TRANSFER
Nursing Transfer Note      1/5/2024   9:59 PM    Nurse giving handoff:Ally PORTILLO RN  Nurse receiving handoff:JOYCE Stewart    Reason patient is being transferred: stepdown from ICU    Transfer From: 3078 To: 329    Transfer via bed - rented wound bed    Transfer with cardiac monitoring    Transported by JOYCE Canales and JOYCE Grimes    Transfer Vital Signs:  Blood Pressure:154/69  Heart Rate:64  O2:100% RA  Temperature:97.7 oral  Respirations:13    Telemetry: NSR HR-64 ttx# [id number]    Additional Lines: De Leon Catheter    4eyes on Skin: yes -- JOYCE Stewart    Medicines sent: none    Any special needs or follow-up needed: f/u CBC and hang 2nd unit of PRBCs    Patient belongings transferred with patient: Yes (Friendsee box and blanket)    Chart send with patient: Yes    Notified: Kim [phone]    Upon arrival to floor: cardiac monitor applied, patient oriented to room, call bell in reach, and bed in lowest position

## 2024-01-06 NOTE — NURSING
Per Dr. Rincon, hold off on giving the 2nd unit of RBCs. Patients hemoglobin is 8 and hematocrit is 25.4. Dr. Rincon also instructed to monitor for bleeding and to wait till morning lab draw to check cbc.

## 2024-01-06 NOTE — PROGRESS NOTES
01/06/24 1430 01/06/24 1445   Vital Signs   Device (Oxygen Therapy) room air room air   BP (!) 146/66 (!) 143/82     Dr. Rivas aware. No new order.

## 2024-01-06 NOTE — PLAN OF CARE
Yesterday evening, patient developed right groin bleed that resolved spontaneously. Hgb stable s/p 1 unit of pRBC transfusion. Tele reviewed- V-paced rhythm. Remains stable  Today, groin site appeared C/D/I. No hematoma noted.   Okay to start eliquis tonight   We will sign off.     Li Bach MD  Cardiovascular Disease PGY5  [facility]

## 2024-01-06 NOTE — PLAN OF CARE
Episode of hypotension yesterday, will likely see rise in creatinine. He is ELIZABETH on advanced CKD, and will likely require the need of initiation of  RRT soon. Will keep monitoring him.

## 2024-01-06 NOTE — PROGRESS NOTES
"[facility] Hospital Medicine  Progress Note    Primary Team: [facility]  Admit Date: 12/28/2023    Subjective:      Hospital Course:   Admitted for CHB which EP thought to be 2/2 uremia/ELIZABETH and infection. Has not required pacing and is HDS. Recommended holding AV iker agents. BUN and sCr newly elevated, seen by nephrology who started dialysis for clearance. Tolerated dialysis with plans to further dialyze. ID recommended erta x 7 days for ESBL EColi UTI. Continued daptomycin for his BL heel OM.  C/b delirium for which CT head which was unremarkable. Stepped down to  but came back to CCU after he went into torsades on 12/31. CPR was initiated and pt regained pulse and consciousness; bradycardic post event with HR ~50. Electrolytes replaced. TVP placed in CCU; spoke with SID who confirmed a DNR status but that she would want a pacemaker placed. EP consulted for PPM insertion. Had another episode of torsades (~ 2 mins) that became CHB and then was paced to NSR. TVP placed. Palliative care consulted for GOC/ACP. Pt more alerted and oriented, states he would like to proceed with the procedure, Micra pacemaker placed on 1/5. Bleeding from groin site with BPs in 80/50s that recovered with 1 L NS bolus. 1 unit prbc as given for Hb 7.3. Stepped down to hospital medicine 1/5.     Interval History:  Alert, eating breakfast. Hb 7.3 s/p 1 unit prbc last night. Repeat Hb this afternoon. No further bleeding. Expresses no complaints. States he is at [facility] for a "bad knee". Will follow up on further Nephrology recommendations today. PTOT ordered.    ROS:  As per HPI  General: no fever, no chills, no weight loss, no fatigue  Cardiovascular: no chest pain, no orthopnea, no dyspnea  Respiratory: no cough, no wheezes, no SOB  All other systems reviewed & are negative.     Past Medical History:   Diagnosis Date    Acute congestive heart failure 3/20/2020    Alcohol abuse     Arthritis     Asthma attack " 2021    Coronary artery disease     Diabetes mellitus     Hyperlipemia     Hypertension     Multiple thyroid nodules 2023    Rhabdomyolysis      Past Surgical History:   Procedure Laterality Date    ECHOCARDIOGRAM,TRANSESOPHAGEAL N/A 2023    Procedure: Transesophageal echo (MARIA) intra-procedure log documentation;  Surgeon: Luisana Rodríguez MD;  Location: [facility] CATH LAB;  Service: Cardiology;  Laterality: N/A;    EYE SURGERY      IRRIGATION AND DEBRIDEMENT Bilateral 2023    Procedure: IRRIGATION AND DEBRIDEMENT;  Surgeon: Jenna Gutiérrez DPM;  Location: [facility] OR;  Service: Podiatry;  Laterality: Bilateral;  Request antibiotic beads    TRANSESOPHAGEAL ECHOCARDIOGRAM WITH POSSIBLE CARDIOVERSION (MARIA W/ POSS CARDIOVERSION) N/A 2023    Procedure: Transesophageal echo (MARIA) intra-procedure log documentation;  Surgeon: Indra Foote MD;  Location: [facility] CATH LAB;  Service: Cardiology;  Laterality: N/A;    TRANSESOPHAGEAL ECHOCARDIOGRAPHY N/A 2023    Procedure: ECHOCARDIOGRAM, TRANSESOPHAGEAL;  Surgeon: Luisana Rodríguez MD;  Location: [facility] CATH LAB;  Service: Cardiology;  Laterality: N/A;    TREATMENT OF CARDIAC ARRHYTHMIA N/A 2020    Procedure: Cardioversion or Defibrillation;  Surgeon: Indra Foote MD;  Location: [facility] CATH LAB;  Service: Cardiology;  Laterality: N/A;    TREATMENT OF CARDIAC ARRHYTHMIA N/A 2020    Procedure: Cardioversion or Defibrillation;  Surgeon: Tyrone Steen MD;  Location: [facility] CATH LAB;  Service: Cardiology;  Laterality: N/A;    TREATMENT OF CARDIAC ARRHYTHMIA N/A 2023    Procedure: Cardioversion or Defibrillation;  Surgeon: Indra Foote MD;  Location: [facility] CATH LAB;  Service: Cardiology;  Laterality: N/A;    VASCULAR SURGERY           Objective:   Last 24 Hour Vital Signs:  BP  Min: 76/52  Max: 174/74  Temp  Av.9 °F (36.6 °C)  Min: 97.4 °F (36.3 °C)  Max: 98.5 °F (36.9 °C)  Pulse  Av.9  Min: 44  Max: 79  Resp  Av.5   Min: 13  Max: 25  SpO2  Av.6 %  Min: 97 %  Max: 100 %  I/O last 3 completed shifts:  In: 2195 [P.O.:360; I.V.:185.8; Blood:326.3; IV Piggyback:1322.9]  Out: 790 [Urine:790]    Physical Examination:  GEN: AAOx2 (person, place, not situation), NAD  HEENT: NCAT, MMM, PERRL, EOMI, oropharynx clear, RIJ trialysis catheter  CV: RRR, systolic ejection murmur  RESP: CTAB, no wheezes/crackles, no increased WOB  ABD: soft, NTND, normoactive BS, no organomegaly  EXTR: no c/c, intact distal pulses x 4, groin dressing c/d/I w/o bleeding, mild pitting edema BLE  : De Leon in place  NEURO: PERRL, EOMI, moving all four extremities, intact sensation to light touch, no focal deficits  SKIN: no rashes, lesions, or color changes  PSYCH: normal affect    Laboratory:  I have reviewed all pertinent lab results/findings within the past 24 hours.    Radiology:  I have reviewed all pertinent imaging results/findings within the past 24 hours.    Current Medications:     Infusions:   sodium chloride 0.9% Stopped (24 1455)        Scheduled:   acetaminophen  1,000 mg Oral Q8H    allopurinoL  50 mg Oral Daily    apixaban  5 mg Oral BID    atorvastatin  80 mg Oral Daily    DAPTOmycin (CUBICIN) IV (PEDS and ADULTS)  8 mg/kg Intravenous Q48H    ertapenem (INVANZ) IVPB  500 mg Intravenous Q24H    famotidine  20 mg Oral Daily    folic acid  1 mg Oral Daily    LIDOcaine  1 patch Transdermal Q24H        PRN:  0.9%  NaCl infusion (for blood administration), 0.9%  NaCl infusion (for blood administration), sodium chloride 0.9%, benzonatate, dextrose 10%, dextrose 10%, glucagon (human recombinant), glucose, glucose, heparin (porcine) 2,000 Units in sodium chloride 0.9% 1,000 mL, heparin (porcine), heparin (porcine), HYDROmorphone, insulin aspart U-100, LIDOcaine HCL 20 mg/ml (2%), magnesium oxide, methocarbamoL, naloxone, oxyCODONE, senna-docusate 8.6-50 mg, sodium chloride 0.9%, sodium chloride 0.9%    Prior records reviewed.        Assessment/Plan:     Chato Bowie is a 73 y.o.male with    Acute renal failure superimposed on stage 3b chronic kidney disease  Admitted with ELIZABETH on CKD  Initial  with sCr 7.5; improving with dialysis  Nephrology consulted, started on HD for clearance  Still making some urine  Dialysis per nephro  Will need to establish longterm dialysis plan with nephro    Anemia of chronic disease  -s/p 1 unit earlier this admission  -Required another unit 1/5 in setting of groin site bleeding that has now resolved.  -Hb 7.3 this AM, repeat his afternoon    Complete heart block  Patient with complete heart block, previous EKG with first degree AV block and LBBB  Patient on metoprolol and amiodarone as an outpatient  HR in the 30s on admission  EP consulted, thought it could be 2/2 uremia/ELIZABETH/infection; held off on TVP initially  Stepped down to HM with plans to see how he improved with dialysis  However went into torsades on 12/31  TVP placed by IC; s/p Micra insertion with EP on 1/5  Hold AV iker agents     Torsades de pointes  Went into torsades overnight 12/31; HR went to 160-170, then lost consciousness and pulse. CPR initiated, Pt regained consciousness and pulse then was transferred back to CCU  Received Mg and K although levels were not critical  Medications reviewed - no obvious meds that could be prolonging QTc  TVP placed by IC  Spoke with MPOA who confirmed DNR code status but she is happy for a PPM to be inserted  Had another episode of torsades on 1/1 for about 2 mins which then converted to CHB which he was paced out of into a HR ~ 65; spoke with EP who advised to overpace him  Remain on telemetry  Keep K > 4, Mg > 2  Will need to check with EP what other medications they may want given the TdP     Osteomyelitis of foot  Admitted on 11/27 from NH for b/l wounds. 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 Wound swab of R ulcer with MRSA.   s/p bone biopsy and debridement by podiatry   On 6 weeks of  IV abx Dapto with end date end of 1/10/2024     Advanced care planning/counseling discussion  Cardiology team spoke with SID over the phone who confirmed DNR status however advised that she would like a PPM inserted     Acute encephalopathy  Likely uremic encephalopathy in the setting of ARF with  and asterixis on exam. Infections (UTI and OM) could also be contributing  Remains disoriented but alertness improved, answers some questions appropriately. Oriented to place and month.   Currently able to protect airway  CT head unremarkable  SLP advised for minced/moist diet  Delirium precautions     UTI (urinary tract infection)  Suspect this patient has complicated cystitis given retention and positive UA  UCx growing multi drug resistant ESBL E coli  ID following, started on ertapenem, anticipate 7 day course, last day 1/7     Paroxysmal atrial flutter  On eliquis at home. Resume this evening now that groin bleed has resolved     Severe aortic valve stenosis  TTE:  Left Ventricle: The left ventricle is normal in size. Ventricular mass is normal. Normal wall thickness. There is concentric remodeling. Septal motion is consistent with bundle branch block. There is normal systolic function with a visually estimated ejection fraction of 55 - 60%. Grade II diastolic dysfunction.    Right Ventricle: Normal right ventricular cavity size. Systolic function is normal.    Left Atrium: Left atrium is severely dilated.    Aortic Valve: There is moderate aortic valve sclerosis. Moderately restricted motion. There is severe stenosis. Aortic valve area by VTI is 0.86 cm². Aortic valve peak velocity is 4.12 m/s. Mean gradient is 43 mmHg. The dimensionless index is 0.22. There is mild to moderate aortic regurgitation.    Mitral Valve: There is bileaflet sclerosis. There is mild annular dilation present.    Tricuspid Valve: There is mild to moderate regurgitation.    Pulmonary Artery: The estimated pulmonary artery systolic  pressure is 72 mmHg.    IVC/SVC: Intermediate venous pressure at 8 mmHg.       GERD (gastroesophageal reflux disease)  Continue famotidine     Type 2 diabetes mellitus with kidney complication, with long-term current use of insulin  Last HbA1c 5.5  POCT TIDWM and prior to bed  LDSSI  Monitor BGLs, start long acting insulin if needed     Dyslipidemia  Continue statin     Epigastric abdominal pain  CTAP showing cholelithiasis, no evidence of acute cholecystitis  RESOLVED      Marilee Rivas MD  Hospital Medicine Staff  [facility]

## 2024-01-06 NOTE — NURSING
Nurses Note -- 4 Eyes      1/6/2024   22:15 PM      Skin assessed during: Admit      [] No Altered Skin Integrity Present    []Prevention Measures Documented      [x] Yes- Altered Skin Integrity Present or Discovered   [] LDA Added if Not in Epic (Describe Wound)   [] New Altered Skin Integrity was Present on Admit and Documented in LDA   [] Wound Image Taken    Wound Care Consulted? No    Attending Nurse:  Pat Rodríguez RN    Second RN/Staff Member:  Ally Saeed RN

## 2024-01-06 NOTE — PLAN OF CARE
Problem: Infection  Goal: Absence of Infection Signs and Symptoms  Outcome: Ongoing, Progressing     Problem: Adult Inpatient Plan of Care  Goal: Plan of Care Review  Outcome: Ongoing, Progressing  Goal: Absence of Hospital-Acquired Illness or Injury  Outcome: Ongoing, Progressing     Problem: Infection (Hemodialysis)  Goal: Absence of Infection Signs and Symptoms  Outcome: Ongoing, Progressing     Problem: Diabetes Comorbidity  Goal: Blood Glucose Level Within Targeted Range  Outcome: Ongoing, Progressing      Plan of care discussed with patient. Patient has no complaints of chest pain/SOB/palpitations.  fall precautions in place,no falls/injuries through the shift. Blood transfusion on going. Discussed medications and care.Patient has no questions at this time.Pt resting comfortably with no acute distress.Call light within reach,bed at lowest position.

## 2024-01-07 LAB
ALBUMIN SERPL BCP-MCNC: 1.8 G/DL (ref 3.5–5.2)
ALP SERPL-CCNC: 74 U/L (ref 55–135)
ALT SERPL W/O P-5'-P-CCNC: 11 U/L (ref 10–44)
ANION GAP SERPL CALC-SCNC: 8 MMOL/L (ref 8–16)
AST SERPL-CCNC: 23 U/L (ref 10–40)
BASOPHILS # BLD AUTO: 0.08 K/UL (ref 0–0.2)
BASOPHILS NFR BLD: 0.9 % (ref 0–1.9)
BILIRUB SERPL-MCNC: 0.5 MG/DL (ref 0.1–1)
BUN SERPL-MCNC: 63 MG/DL (ref 8–23)
CALCIUM SERPL-MCNC: 8.2 MG/DL (ref 8.7–10.5)
CHLORIDE SERPL-SCNC: 110 MMOL/L (ref 95–110)
CO2 SERPL-SCNC: 23 MMOL/L (ref 23–29)
CREAT SERPL-MCNC: 5.5 MG/DL (ref 0.5–1.4)
DIFFERENTIAL METHOD BLD: ABNORMAL
EOSINOPHIL # BLD AUTO: 0.4 K/UL (ref 0–0.5)
EOSINOPHIL NFR BLD: 3.9 % (ref 0–8)
ERYTHROCYTE [DISTWIDTH] IN BLOOD BY AUTOMATED COUNT: 17.7 % (ref 11.5–14.5)
EST. GFR  (NO RACE VARIABLE): 10.3 ML/MIN/1.73 M^2
GLUCOSE SERPL-MCNC: 76 MG/DL (ref 70–110)
HCT VFR BLD AUTO: 21.4 % (ref 40–54)
HGB BLD-MCNC: 7.2 G/DL (ref 14–18)
HGB BLD-MCNC: 7.9 G/DL (ref 14–18)
IMM GRANULOCYTES # BLD AUTO: 0.09 K/UL (ref 0–0.04)
IMM GRANULOCYTES NFR BLD AUTO: 1 % (ref 0–0.5)
LYMPHOCYTES # BLD AUTO: 1.1 K/UL (ref 1–4.8)
LYMPHOCYTES NFR BLD: 12 % (ref 18–48)
MAGNESIUM SERPL-MCNC: 2.4 MG/DL (ref 1.6–2.6)
MCH RBC QN AUTO: 26.3 PG (ref 27–31)
MCHC RBC AUTO-ENTMCNC: 33.6 G/DL (ref 32–36)
MCV RBC AUTO: 78 FL (ref 82–98)
MONOCYTES # BLD AUTO: 1 K/UL (ref 0.3–1)
MONOCYTES NFR BLD: 11.4 % (ref 4–15)
NEUTROPHILS # BLD AUTO: 6.5 K/UL (ref 1.8–7.7)
NEUTROPHILS NFR BLD: 70.8 % (ref 38–73)
NRBC BLD-RTO: 0 /100 WBC
PHOSPHATE SERPL-MCNC: 3.9 MG/DL (ref 2.7–4.5)
PLATELET # BLD AUTO: 182 K/UL (ref 150–450)
PMV BLD AUTO: 8.7 FL (ref 9.2–12.9)
POCT GLUCOSE: 121 MG/DL (ref 70–110)
POCT GLUCOSE: 270 MG/DL (ref 70–110)
POTASSIUM SERPL-SCNC: 4.1 MMOL/L (ref 3.5–5.1)
PROT SERPL-MCNC: 5.4 G/DL (ref 6–8.4)
RBC # BLD AUTO: 2.74 M/UL (ref 4.6–6.2)
SODIUM SERPL-SCNC: 141 MMOL/L (ref 136–145)
WBC # BLD AUTO: 9.14 K/UL (ref 3.9–12.7)

## 2024-01-07 PROCEDURE — 93005 ELECTROCARDIOGRAM TRACING: CPT

## 2024-01-07 PROCEDURE — 97161 PT EVAL LOW COMPLEX 20 MIN: CPT

## 2024-01-07 PROCEDURE — 80053 COMPREHEN METABOLIC PANEL: CPT | Performed by: INTERNAL MEDICINE

## 2024-01-07 PROCEDURE — 97530 THERAPEUTIC ACTIVITIES: CPT

## 2024-01-07 PROCEDURE — 63600175 PHARM REV CODE 636 W HCPCS: Performed by: HOSPITALIST

## 2024-01-07 PROCEDURE — 27000207 HC ISOLATION

## 2024-01-07 PROCEDURE — 25000003 PHARM REV CODE 250: Performed by: STUDENT IN AN ORGANIZED HEALTH CARE EDUCATION/TRAINING PROGRAM

## 2024-01-07 PROCEDURE — 84100 ASSAY OF PHOSPHORUS: CPT | Performed by: INTERNAL MEDICINE

## 2024-01-07 PROCEDURE — 20600001 HC STEP DOWN PRIVATE ROOM

## 2024-01-07 PROCEDURE — 85025 COMPLETE CBC W/AUTO DIFF WBC: CPT | Performed by: INTERNAL MEDICINE

## 2024-01-07 PROCEDURE — 85018 HEMOGLOBIN: CPT | Performed by: HOSPITALIST

## 2024-01-07 PROCEDURE — 83735 ASSAY OF MAGNESIUM: CPT | Performed by: INTERNAL MEDICINE

## 2024-01-07 PROCEDURE — 25000003 PHARM REV CODE 250: Performed by: HOSPITALIST

## 2024-01-07 PROCEDURE — 97535 SELF CARE MNGMENT TRAINING: CPT

## 2024-01-07 PROCEDURE — 63600175 PHARM REV CODE 636 W HCPCS

## 2024-01-07 PROCEDURE — 93010 ELECTROCARDIOGRAM REPORT: CPT | Mod: ,,, | Performed by: INTERNAL MEDICINE

## 2024-01-07 PROCEDURE — 25000003 PHARM REV CODE 250

## 2024-01-07 PROCEDURE — 25000003 PHARM REV CODE 250: Performed by: INTERNAL MEDICINE

## 2024-01-07 PROCEDURE — 97165 OT EVAL LOW COMPLEX 30 MIN: CPT

## 2024-01-07 RX ORDER — NIFEDIPINE 30 MG/1
30 TABLET, EXTENDED RELEASE ORAL DAILY
Status: DISCONTINUED | OUTPATIENT
Start: 2024-01-07 | End: 2024-01-19 | Stop reason: HOSPADM

## 2024-01-07 RX ADMIN — ACETAMINOPHEN 1000 MG: 500 TABLET ORAL at 09:01

## 2024-01-07 RX ADMIN — NIFEDIPINE 30 MG: 30 TABLET, FILM COATED, EXTENDED RELEASE ORAL at 04:01

## 2024-01-07 RX ADMIN — DAPTOMYCIN 650 MG: 350 INJECTION, POWDER, LYOPHILIZED, FOR SOLUTION INTRAVENOUS at 11:01

## 2024-01-07 RX ADMIN — ALLOPURINOL 50 MG: 300 TABLET ORAL at 09:01

## 2024-01-07 RX ADMIN — APIXABAN 5 MG: 5 TABLET, FILM COATED ORAL at 09:01

## 2024-01-07 RX ADMIN — FAMOTIDINE 20 MG: 20 TABLET, FILM COATED ORAL at 09:01

## 2024-01-07 RX ADMIN — ATORVASTATIN CALCIUM 80 MG: 40 TABLET, FILM COATED ORAL at 09:01

## 2024-01-07 RX ADMIN — INSULIN ASPART 3 UNITS: 100 INJECTION, SOLUTION INTRAVENOUS; SUBCUTANEOUS at 12:01

## 2024-01-07 RX ADMIN — FOLIC ACID 1 MG: 1 TABLET ORAL at 09:01

## 2024-01-07 NOTE — PLAN OF CARE
Patient's vss are stable, on room air. No vital signs taken during the hours of 11pm and 5am per Dr's. Orders.      Problem: Infection  Goal: Absence of Infection Signs and Symptoms  Outcome: Ongoing, Progressing     Problem: Adult Inpatient Plan of Care  Goal: Plan of Care Review  Outcome: Ongoing, Progressing  Goal: Patient-Specific Goal (Individualized)  Outcome: Ongoing, Progressing  Goal: Absence of Hospital-Acquired Illness or Injury  Outcome: Ongoing, Progressing  Goal: Optimal Comfort and Wellbeing  Outcome: Ongoing, Progressing  Goal: Readiness for Transition of Care  Outcome: Ongoing, Progressing     Problem: Device-Related Complication Risk (Hemodialysis)  Goal: Safe, Effective Therapy Delivery  Outcome: Ongoing, Progressing     Problem: Hemodynamic Instability (Hemodialysis)  Goal: Effective Tissue Perfusion  Outcome: Ongoing, Progressing     Problem: Infection (Hemodialysis)  Goal: Absence of Infection Signs and Symptoms  Outcome: Ongoing, Progressing     Problem: Diabetes Comorbidity  Goal: Blood Glucose Level Within Targeted Range  Outcome: Ongoing, Progressing     Problem: Fluid and Electrolyte Imbalance (Acute Kidney Injury/Impairment)  Goal: Fluid and Electrolyte Balance  Outcome: Ongoing, Progressing     Problem: Oral Intake Inadequate (Acute Kidney Injury/Impairment)  Goal: Optimal Nutrition Intake  Outcome: Ongoing, Progressing     Problem: Renal Function Impairment (Acute Kidney Injury/Impairment)  Goal: Effective Renal Function  Outcome: Ongoing, Progressing     Problem: Impaired Wound Healing  Goal: Optimal Wound Healing  Outcome: Ongoing, Progressing     Problem: Skin Injury Risk Increased  Goal: Skin Health and Integrity  Outcome: Ongoing, Progressing     Problem: Coping Ineffective  Goal: Effective Coping  Outcome: Ongoing, Progressing

## 2024-01-07 NOTE — PROGRESS NOTES
[facility] Hospital Medicine  Progress Note    Primary Team: [facility] HOSP MED C  Admit Date: 12/28/2023    Subjective:      Hospital Course:   Admitted for CHB which EP thought to be 2/2 uremia/ELIZABETH and infection. Has not required pacing and is HDS. Recommended holding AV iker agents. BUN and sCr newly elevated, seen by nephrology who started dialysis for clearance. Tolerated dialysis with plans to further dialyze. ID recommended erta x 7 days for ESBL EColi UTI. Continued daptomycin for his BL heel OM.  C/b delirium for which CT head which was unremarkable. Stepped down to  but came back to CCU after he went into torsades on 12/31. CPR was initiated and pt regained pulse and consciousness; bradycardic post event with HR ~50. Electrolytes replaced. TVP placed in CCU; spoke with MPOA who confirmed a DNR status but that she would want a pacemaker placed. EP consulted for PPM insertion. Had another episode of torsades (~ 2 mins) that became CHB and then was paced to NSR. TVP placed. Palliative care consulted for GOC/ACP. Pt more alerted and oriented, states he would like to proceed with the procedure, Micra pacemaker placed on 1/5. Bleeding from groin site with BPs in 80/50s that recovered with 1 L NS bolus. 1 unit prbc as given for Hb 7.3. Stepped down to hospital medicine 1/5.     Interval History:  Received another unit of blood yesterday. Hb 6.8 > 7.2, will repeat in afternoon. Cr 5.1 > 5.5. Expresses no complaints. No overt bleeding. Will follow up on further Nephrology recommendations today. PTOT consulted. Refused vitals and glucose checks and meds this AM.     ROS:  As per HPI  General: no fever, no chills, no weight loss, no fatigue  Cardiovascular: no chest pain, no orthopnea, no dyspnea  Respiratory: no cough, no wheezes, no SOB  All other systems reviewed & are negative.     Past Medical History:   Diagnosis Date    Acute congestive heart failure 3/20/2020    Alcohol abuse     Arthritis      Asthma attack 2021    Coronary artery disease     Diabetes mellitus     Hyperlipemia     Hypertension     Multiple thyroid nodules 2023    Rhabdomyolysis      Past Surgical History:   Procedure Laterality Date    ECHOCARDIOGRAM,TRANSESOPHAGEAL N/A 2023    Procedure: Transesophageal echo (MARIA) intra-procedure log documentation;  Surgeon: Luisana Rodríguez MD;  Location: [facility] CATH LAB;  Service: Cardiology;  Laterality: N/A;    EYE SURGERY      IRRIGATION AND DEBRIDEMENT Bilateral 2023    Procedure: IRRIGATION AND DEBRIDEMENT;  Surgeon: Jenna Gutiérrez DPM;  Location: [facility] OR;  Service: Podiatry;  Laterality: Bilateral;  Request antibiotic beads    TRANSESOPHAGEAL ECHOCARDIOGRAM WITH POSSIBLE CARDIOVERSION (MARIA W/ POSS CARDIOVERSION) N/A 2023    Procedure: Transesophageal echo (MARIA) intra-procedure log documentation;  Surgeon: Indra Foote MD;  Location: [facility] CATH LAB;  Service: Cardiology;  Laterality: N/A;    TRANSESOPHAGEAL ECHOCARDIOGRAPHY N/A 2023    Procedure: ECHOCARDIOGRAM, TRANSESOPHAGEAL;  Surgeon: Luisana Rodríguez MD;  Location: [facility] CATH LAB;  Service: Cardiology;  Laterality: N/A;    TREATMENT OF CARDIAC ARRHYTHMIA N/A 2020    Procedure: Cardioversion or Defibrillation;  Surgeon: Indra Foote MD;  Location: [facility] CATH LAB;  Service: Cardiology;  Laterality: N/A;    TREATMENT OF CARDIAC ARRHYTHMIA N/A 2020    Procedure: Cardioversion or Defibrillation;  Surgeon: Tyrone Steen MD;  Location: [facility] CATH LAB;  Service: Cardiology;  Laterality: N/A;    TREATMENT OF CARDIAC ARRHYTHMIA N/A 2023    Procedure: Cardioversion or Defibrillation;  Surgeon: Indra Foote MD;  Location: [facility] CATH LAB;  Service: Cardiology;  Laterality: N/A;    VASCULAR SURGERY           Objective:   Last 24 Hour Vital Signs:  BP  Min: 138/62  Max: 160/73  Temp  Av.1 °F (36.7 °C)  Min: 97.4 °F (36.3 °C)  Max: 98.7 °F (37.1 °C)  Pulse  Av.6  Min: 59  Max:  75  Resp  Av.8  Min: 16  Max: 21  SpO2  Av.7 %  Min: 98 %  Max: 100 %  I/O last 3 completed shifts:  In: 1037.3 [P.O.:354; Blood:683.3]  Out: 400 [Urine:400]    Physical Examination:  GEN: AAOx2 (person, place, not situation), NAD  HEENT: NCAT, MMM, PERRL, EOMI, oropharynx clear, RIJ trialysis catheter  CV: RRR, systolic ejection murmur  RESP: CTAB, no wheezes/crackles, no increased WOB  ABD: soft, NTND, normoactive BS, no organomegaly  EXTR: no c/c, intact distal pulses x 4, groin dressing c/d/I w/o bleeding, mild pitting edema BLE  : [place] in place  NEURO: PERRL, EOMI, moving all four extremities, intact sensation to light touch, no focal deficits  SKIN: no rashes, lesions, or color changes  PSYCH: normal affect    Laboratory:  I have reviewed all pertinent lab results/findings within the past 24 hours.    Radiology:  I have reviewed all pertinent imaging results/findings within the past 24 hours.    Current Medications:     Infusions:   sodium chloride 0.9% Stopped (24 4485)        Scheduled:   acetaminophen  1,000 mg Oral Q8H    allopurinoL  50 mg Oral Daily    apixaban  5 mg Oral BID    atorvastatin  80 mg Oral Daily    DAPTOmycin (CUBICIN) IV (PEDS and ADULTS)  8 mg/kg Intravenous Q48H    famotidine  20 mg Oral Daily    folic acid  1 mg Oral Daily    LIDOcaine  1 patch Transdermal Q24H        PRN:  0.9%  NaCl infusion (for blood administration), 0.9%  NaCl infusion (for blood administration), 0.9%  NaCl infusion (for blood administration), sodium chloride 0.9%, benzonatate, dextrose 10%, dextrose 10%, glucagon (human recombinant), glucose, glucose, heparin (porcine) 2,000 Units in sodium chloride 0.9% 1,000 mL, heparin (porcine), heparin (porcine), insulin aspart U-100, LIDOcaine HCL 20 mg/ml (2%), magnesium oxide, naloxone, senna-docusate 8.6-50 mg, sodium chloride 0.9%, sodium chloride 0.9%    Prior records reviewed.       Assessment/Plan:     Chato Bowie is a 73 y.o.male with    Acute  renal failure superimposed on stage 3b chronic kidney disease  Admitted with ELIZABETH on CKD  Initial  with sCr 7.5; improving with dialysis  Nephrology consulted, started on HD for clearance  Still making some urine  Dialysis per nephro  Will need to establish longterm dialysis plan with nephro    Anemia of chronic disease  -s/p 1 unit earlier this admission  -Required another unit 1/5 in setting of groin site bleeding that has now resolved. 1 more unit 1/6.   -No overt bleeding  -Hb 7.2 this AM, repeat his afternoon    Complete heart block  Patient with complete heart block, previous EKG with first degree AV block and LBBB  Patient on metoprolol and amiodarone as an outpatient  HR in the 30s on admission  EP consulted, thought it could be 2/2 uremia/ELIZABETH/infection; held off on TVP initially  Stepped down to HM with plans to see how he improved with dialysis  However went into torsades on 12/31  TVP placed by IC; s/p Micra insertion with EP on 1/5  Hold AV iker agents     Torsades de pointes  Went into torsades overnight 12/31; HR went to 160-170, then lost consciousness and pulse. CPR initiated, Pt regained consciousness and pulse then was transferred back to CCU  Received Mg and K although levels were not critical  Medications reviewed - no obvious meds that could be prolonging QTc  TVP placed by IC  Spoke with SID who confirmed DNR code status but she is happy for a PPM to be inserted  Had another episode of torsades on 1/1 for about 2 mins which then converted to CHB which he was paced out of into a HR ~ 65; spoke with EP who advised to overpace him  Remain on telemetry  Keep K > 4, Mg > 2  Will need to check with EP what other medications they may want given the TdP     Osteomyelitis of foot  Admitted on 11/27 from NH for b/l wounds. 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 Wound swab of R ulcer with MRSA.   s/p bone biopsy and debridement by podiatry   On 6 weeks of IV abx Dapto with end date end of  1/10/2024     Advanced care planning/counseling discussion  Cardiology team spoke with SID over the phone who confirmed DNR status however advised that she would like a PPM inserted     Acute encephalopathy  Likely uremic encephalopathy in the setting of ARF with  and asterixis on exam. Infections (UTI and OM) could also be contributing  Remains disoriented but alertness improved, answers some questions appropriately. Oriented to place and month.   Currently able to protect airway  CT head unremarkable  SLP advised for minced/moist diet  Delirium precautions     UTI (urinary tract infection)  Suspect this patient has complicated cystitis given retention and positive UA  UCx growing multi drug resistant ESBL E coli  ID following, started on ertapenem, anticipate 7 day course, last day 1/7     Paroxysmal atrial flutter  On eliquis at home. Resume this evening now that groin bleed has resolved     Severe aortic valve stenosis  TTE:  Left Ventricle: The left ventricle is normal in size. Ventricular mass is normal. Normal wall thickness. There is concentric remodeling. Septal motion is consistent with bundle branch block. There is normal systolic function with a visually estimated ejection fraction of 55 - 60%. Grade II diastolic dysfunction.    Right Ventricle: Normal right ventricular cavity size. Systolic function is normal.    Left Atrium: Left atrium is severely dilated.    Aortic Valve: There is moderate aortic valve sclerosis. Moderately restricted motion. There is severe stenosis. Aortic valve area by VTI is 0.86 cm². Aortic valve peak velocity is 4.12 m/s. Mean gradient is 43 mmHg. The dimensionless index is 0.22. There is mild to moderate aortic regurgitation.    Mitral Valve: There is bileaflet sclerosis. There is mild annular dilation present.    Tricuspid Valve: There is mild to moderate regurgitation.    Pulmonary Artery: The estimated pulmonary artery systolic pressure is 72 mmHg.    IVC/SVC:  Intermediate venous pressure at 8 mmHg.       GERD (gastroesophageal reflux disease)  Continue famotidine     Type 2 diabetes mellitus with kidney complication, with long-term current use of insulin  Last HbA1c 5.5  POCT TIDWM and prior to bed  LDSSI  Monitor BGLs, start long acting insulin if needed     Dyslipidemia  Continue statin     Epigastric abdominal pain  CTAP showing cholelithiasis, no evidence of acute cholecystitis  RESOLVED      Marilee Rivas MD  Hospital Medicine Staff  [facility]

## 2024-01-07 NOTE — PLAN OF CARE
Problem: Physical Therapy  Goal: Physical Therapy Goal  Description: Goals to met by 1/21/2024    1. Supine to sit with Stand-by Assistance  2. Sit to supine with Stand-by Assistance  3. Rolling to Left and Right with Stand-by Assistance.  4. Sit to stand transfer with Minimal Assistance  5. Bed to chair transfer with Minimal Assistance using LRAD  6. Gait  x 15 feet with Minimal Assistance using Rolling Walker   7. Sitting at edge of bed x10 minutes with Stand-by Assistance  8. Stand for 5 minutes with Contact Guard Assistance using Rolling Walker  9. Lower extremity exercise program x15 reps per Instruction, with assistance as needed in order to facilitate improved strength, improved postural control, and improvement in functional independence    PT Eval: 1/7/2024

## 2024-01-07 NOTE — PROGRESS NOTES
01/06/24 1801   Vital Signs   Device (Oxygen Therapy) room air   BP (!) 150/67   MAP (mmHg) 97   BP Location Left arm   BP Method Automatic     Post transfusion. Dr. Rivas aware. No new order.

## 2024-01-07 NOTE — NURSING
Informed Dr. Logan of patient's hemoglobin levels taken at 9pm and 4am.     Copy and past below    Hello, fyi patient's hemoglobin level from the 9pm draw is 7.7, bp is 160/73, map is 105.    9:50 PM  EP  Desmond Resendiz, DO  Ok post transfusion?  No complaints?     Tequia:  yes post and he is not complaining of any pain and wants to be left alone.     Tequia:  Patient's hemoglobin level is 7.2, bp at 4:26 was 148/67 with a map of 96. breaths per minute at that time was 21.

## 2024-01-07 NOTE — NURSING
Nurses Note -- 4 Eyes      1/6/2024   6:45PM      Skin assessed during: Q Shift Change      [] No Altered Skin Integrity Present    []Prevention Measures Documented      [x] Yes- Altered Skin Integrity Present or Discovered   [] LDA Added if Not in Epic (Describe Wound)   [] New Altered Skin Integrity was Present on Admit and Documented in LDA   [] Wound Image Taken    Wound Care Consulted? No    Attending Nurse:  Pat Rodríguez RN    Second RN/Staff Member:  James Polanco RN

## 2024-01-07 NOTE — PT/OT/SLP EVAL
Physical Therapy Co-Evaluation and Co-Treatment  Co-evaluation with OT for maximal pt participation, safety, and activity tolerance    Patient Name:  Chato Bowie   MRN:  [id number]  Admit Date: 12/28/2023  Admitting Diagnosis:  Acute renal failure superimposed on stage 3b chronic kidney disease   Length of Stay: 10 days  Recent Surgery: Procedure(s) (LRB):  INSERTION, CARDIAC PACEMAKER, LEADLESS (N/A) 2 Days Post-Op    Recommendations:     Discharge Recommendations:  No Therapy Indicated (Return to NH)  Discharge Equipment Recommendations: none   Justification for Equipment: N/A  Barriers to discharge: Evolving Clinical Presentation    Assessment:     Chato Bowie is a 73 y.o. male admitted with a medical diagnosis of Acute renal failure superimposed on stage 3b chronic kidney disease.  He presents with the following impairments/functional limitations: weakness, impaired functional mobility, gait instability, impaired endurance, pain, decreased safety awareness, impaired cognition, impaired balance, impaired self care skills, decreased lower extremity function.     Pt agreeable to therapy, wants to be repositioned in bed, niece in room assists with social history and reports that plan is for him to return to nursing home and that he has not walked in a few months. Pt tolerates sitting EOB for a few minutes but has increasing posterior lean that makes it too unsafe to attempt to stand at this time, continue to encourage OOB activity and increase in functional mobility    Rehab Prognosis: Fair; patient would benefit from acute skilled PT services to address these deficits and reach maximum level of function.      Treatment Tolerated: Fairly Poorly    Highest level of mobility achieved this visit: sits EOB ~ 10 min with min to max A    Activity with RN/PCT: bed mobility only    Plan:     During this hospitalization, patient to be seen 3 x/week to address the identified rehab impairments via gait training, therapeutic  "activities, neuromuscular re-education, therapeutic exercises and progress towards the established goals.    Plan of Care Expires:  02/07/24    Subjective     RN James notified prior to session. Pt's niece present upon PT entrance into room.    Chief Complaint: pain  Patient/Family Comments/goals: "I'm going to try and do better for you when y'all come back"  Pain/Comfort:  Pain Rating 1:  (no pain at rest, increased while sitting up)  Location - Side 1: Left  Location - Orientation 1: generalized  Location 1: leg  Pain Addressed 1: Cessation of Activity, Distraction, Reposition    Social History:  Residence: lives in a nursing home.  Support available: NH Staff  Equipment Used: wheelchair  Equipment Owned (not using): None  Prior level of function: assist required for ADLs and mobility  Work: Retired.   Drive: no.       Objective:     Additional staff present: OT Alyson    Patient found HOB elevated with: jackson catheter, PICC line, Trialysis     General Precautions: Standard, fall, contact, aspiration   Orthopedic Precautions:N/A   Braces: N/A   Body mass index is 24.83 kg/m².  Oxygen Device: Room Air    Vitals: BP (!) 154/66 (BP Location: Right arm, Patient Position: Lying)   Pulse 62   Temp 98.7 °F (37.1 °C) (Oral)   Resp 20   Ht 5' 11" (1.803 m)   Wt 80.7 kg (178 lb)   SpO2 99%   BMI 24.83 kg/m²     Exams:  Cognition:   Labile  Command following: Follows one-step verbal commands  Fluency: expressive and receptive language deficits  Hearing: Impaired: [place]  Vision:  Intact  Skin Integrity: Visible skin intact    Physical Exam:   Edema - None noted  ROM - LEs passively WFL  Strength - L hip and knee 1/5, R hip and knee 2+/5, WOJCIECH ankle 2/5   Sensation - Intact to light touch  Coordination - No deficits noted    Outcome Measures:    AM-PAC 6 CLICK MOBILITY  Turning over in bed (including adjusting bedclothes, sheets and blankets)?: 2  Sitting down on and standing up from a chair with arms (e.g., wheelchair, " bedside commode, etc.): 1  Moving from lying on back to sitting on the side of the bed?: 2  Moving to and from a bed to a chair (including a wheelchair)?: 1  Need to walk in hospital room?: 1  Climbing 3-5 steps with a railing?: 1  Basic Mobility Total Score: 8     Functional Mobility:    Bed Mobility:   Supine to Sit: maximal assistance of 2 persons; from L side of bed  Scooting anteriorly to EOB to have both feet planted on floor: maximal assistance  Sit to Supine: maximal assistance of 2 persons; to R side of bed    Sitting Balance at Edge of Bed:  Assistance Level Required: Minimal Assistance to Maximum Assistance  Time: 10 min  Postural deviations noted: slouched posture and strong posterior lean  Encouraged: upright sitting  Comments: hip and knee extension contributing to high sliding risk at EOB    Transfers: Deferred due to poor sitting tolerance    Education:  Time provided for education, counseling and discussion of health disposition in regards to patient's current status  All questions answered within PT scope of practice and to patient's satisfaction  PT role in POC to address current functional deficits  Pt educated on proper body mechanics, safety techniques, and energy conservation with PT facilitation and cueing throughout session    Patient left HOB elevated with all lines intact and call button in reach.    GOALS:   Multidisciplinary Problems       Physical Therapy Goals          Problem: Physical Therapy    Goal Priority Disciplines Outcome Goal Variances Interventions   Physical Therapy Goal     PT, PT/OT Ongoing, Progressing     Description: Goals to met by 1/21/2024    1. Supine to sit with Stand-by Assistance  2. Sit to supine with Stand-by Assistance  3. Rolling to Left and Right with Stand-by Assistance.  4. Sit to stand transfer with Minimal Assistance  5. Bed to chair transfer with Minimal Assistance using LRAD  6. Gait  x 15 feet with Minimal Assistance using Rolling Walker   7. Sitting  at edge of bed x10 minutes with Stand-by Assistance  8. Stand for 5 minutes with Contact Guard Assistance using Rolling Walker  9. Lower extremity exercise program x15 reps per Instruction, with assistance as needed in order to facilitate improved strength, improved postural control, and improvement in functional independence                       History:     Past Medical History:   Diagnosis Date    Acute congestive heart failure 3/20/2020    Alcohol abuse     Arthritis     Asthma attack 11/24/2021    Coronary artery disease     Diabetes mellitus     Hyperlipemia     Hypertension     Multiple thyroid nodules 6/14/2023    Rhabdomyolysis        Past Surgical History:   Procedure Laterality Date    ECHOCARDIOGRAM,TRANSESOPHAGEAL N/A 9/21/2023    Procedure: Transesophageal echo (MARIA) intra-procedure log documentation;  Surgeon: Luisana Rodríguez MD;  Location: [facility] CATH LAB;  Service: Cardiology;  Laterality: N/A;    EYE SURGERY      IRRIGATION AND DEBRIDEMENT Bilateral 12/1/2023    Procedure: IRRIGATION AND DEBRIDEMENT;  Surgeon: Jenna Gutiérrez DPM;  Location: [facility] OR;  Service: Podiatry;  Laterality: Bilateral;  Request antibiotic beads    TRANSESOPHAGEAL ECHOCARDIOGRAM WITH POSSIBLE CARDIOVERSION (MARIA W/ POSS CARDIOVERSION) N/A 1/5/2023    Procedure: Transesophageal echo (MARIA) intra-procedure log documentation;  Surgeon: Indra Foote MD;  Location: [facility] CATH LAB;  Service: Cardiology;  Laterality: N/A;    TRANSESOPHAGEAL ECHOCARDIOGRAPHY N/A 9/21/2023    Procedure: ECHOCARDIOGRAM, TRANSESOPHAGEAL;  Surgeon: Luisana Rodríguez MD;  Location: [facility] CATH LAB;  Service: Cardiology;  Laterality: N/A;    TREATMENT OF CARDIAC ARRHYTHMIA N/A 12/7/2020    Procedure: Cardioversion or Defibrillation;  Surgeon: Indra Foote MD;  Location: [facility] CATH LAB;  Service: Cardiology;  Laterality: N/A;    TREATMENT OF CARDIAC ARRHYTHMIA N/A 12/30/2020    Procedure: Cardioversion or Defibrillation;  Surgeon: Tyrone Steen  MD;  Location: [facility] CATH LAB;  Service: Cardiology;  Laterality: N/A;    TREATMENT OF CARDIAC ARRHYTHMIA N/A 1/5/2023    Procedure: Cardioversion or Defibrillation;  Surgeon: Indra Foote MD;  Location: [facility] CATH LAB;  Service: Cardiology;  Laterality: N/A;    VASCULAR SURGERY         Time Tracking:     PT Received On: 01/07/24  PT Start Time: 0931     PT Stop Time: 0958  PT Total Time (min): 27 min     Billable Minutes: Evaluation 1 procedure and Therapeutic Activity 10 min    Rk Robledo, PT, DPT  1/7/2024

## 2024-01-07 NOTE — PROGRESS NOTES
01/07/24 1554   Vital Signs   Temp 98.7 °F (37.1 °C)   Temp Source Oral   Pulse 61   Heart Rate Source Monitor   Resp 20   SpO2 98 %   BP (!) 165/70   MAP (mmHg) 101   BP Location Right arm   Patient Position Lying     Dr. Rivas notified. Nifedipine ordered. Given       01/07/24 1700   Vital Signs   /64   BP Location Right arm   BP Method Automatic   Patient Position Lying

## 2024-01-07 NOTE — PT/OT/SLP EVAL
Occupational Therapy  Co- Evaluation and Co-Treatment    Name: Chato Bowie  MRN: [id number]  Admitting Diagnosis: Acute renal failure superimposed on stage 3b chronic kidney disease  Recent Surgery: Procedure(s) (LRB):  INSERTION, CARDIAC PACEMAKER, LEADLESS (N/A) 2 Days Post-Op    Recommendations:     Discharge Recommendations: No Therapy Indicated (return to NH)  Discharge Equipment Recommendations:  to be determined by next level of care, hospital bed  Barriers to discharge:       Assessment:     Chato Bowie is a 73 y.o. male with a medical diagnosis of Acute renal failure superimposed on stage 3b chronic kidney disease.  Pt presents with decreased endurance and impaired mobility performance as limited by cardiovascular status and generalized weakness. Pt found upright in bed and agreeable for therapy. Pt session focused on EOB ADLs with pt unable to safely stand today 2/2 posterior lean and sliding quickly towards EOB. PTA, pt was living at a NH and per niece has been unable to walk with RW for several months. Pt typically requires A for w/c t/f where he can occasionally propel self. Patient continues to demonstrate the need for occupational therapy on a scheduled basis exhibited by decreased independence with self-care and functional mobility   Performance deficits affecting function: weakness, impaired functional mobility, impaired endurance, gait instability, impaired cardiopulmonary response to activity, impaired self care skills, impaired balance, decreased safety awareness, impaired skin, decreased upper extremity function, decreased lower extremity function.      Rehab Prognosis: Good; patient would benefit from acute skilled OT services to address these deficits and reach maximum level of function.       Plan:     Patient to be seen 3 x/week to address the above listed problems via self-care/home management, therapeutic activities, therapeutic exercises, neuromuscular re-education  Plan of Care  "Expires: 02/07/24  Plan of Care Reviewed with: patient    Subjective     Chief Complaint: pain in heels  Patient/Family Comments/goals: "I'm sorry guys I want to work with ya'll"    Occupational Profile:  Living Environment: Pt lives at [facility] (per niece, plan is to return to NH).  Previous level of function: Pt has not walked in 4-5 months per niece. Pt uses physical A to t/f to w/c where he can occasionally propel self. Pt states he can complete grooming and feeding with setup, receives A for all other ADLs.  Roles and Routines: Not driving; resident  Equipment Used at Home: wheelchair  Assistance upon Discharge: [facility] staff     Pain/Comfort:  Pain Rating 1: 0/10  Pain Rating Post-Intervention 1: 0/10    Patients cultural, spiritual, Islam conflicts given the current situation: no    Objective:   Co-treatment with PT for maximal pt participation, safety, and activity tolerance     Communicated with: RN  prior to session.  Patient found HOB elevated with central line, peripheral IV, jackson catheter upon OT entry to room.    General Precautions: Standard, fall, contact, aspiration  Orthopedic Precautions: N/A  Braces: N/A  Respiratory Status: Room air    Occupational Performance:    Bed Mobility:    Patient completed Rolling/Turning to Left with  maximal assistance and 2 persons  Patient completed Scooting/Bridging with maximal assistance and 2 persons  Patient completed Supine to Sit with maximal assistance and 2 persons  Patient completed Sit to Supine with maximal assistance and 2 persons    Functional Mobility/Transfers:  Functional Mobility: Pt sat EOB ~10 minutes with min-max A, often pushing posteriorly and sliding self forward.    Activities of Daily Living:  Feeding:  setup A of meal upright in bed   Grooming: setup A of oral care items with pt completing upright in bed   Lower Body Dressing: total assistance donning  socks in bed     Cognitive/Visual " Perceptual:  Cognitive/Psychosocial Skills:     -       Oriented to: Person, Place, Time, and Situation   -       Follows Commands/attention:Follows multistep  commands  -       Communication: clear/fluent  -       Memory: No Deficits noted  -       Safety awareness/insight to disability: intact   -       Mood/Affect/Coping skills/emotional control: Appropriate to situation    Physical Exam:  Balance:    -       demo poor sitting balance at EOB despite cues and use of BUEs  Dominant hand:    -       right  Upper Extremity Range of Motion:     -       Right Upper Extremity: WFL  -       Left Upper Extremity: WFL  Upper Extremity Strength:    -       Right Upper Extremity: WFL  -       Left Upper Extremity: WFL   Strength:    -       Right Upper Extremity: WFL  -       Left Upper Extremity: WFL    AMPAC 6 Click ADL:  AMPAC Total Score: 7    Treatment & Education:  Pt educated on role of occupational therapy, POC, and safety during ADLs and functional mobility. Pt and OT discussed importance of safe, continued mobility to optimize daily living skills. Pt verbalized understanding.     White board updated during session. Pt given instruction to call for medical staff/nurse for assistance.       Patient left HOB elevated with all lines intact, call button in reach, and RN notified    GOALS:   Multidisciplinary Problems       Occupational Therapy Goals          Problem: Occupational Therapy    Goal Priority Disciplines Outcome Interventions   Occupational Therapy Goal     OT, PT/OT Ongoing, Progressing    Description: Goals to be met by: 2/7/24 (1 month)     Patient will increase functional independence with ADLs by performing:    Feeding with Supervision.  Grooming while seated with Stand-by Assistance.  Toileting from bedside commode with Moderate Assistance for hygiene and clothing management.   Sitting at edge of bed x10 minutes with Stand-by Assistance.  Rolling to Bilateral with Minimal Assistance.   Squat pivot  transfers with Moderate Assistance.  Toilet transfer to bedside commode with Moderate Assistance.                         History:     Past Medical History:   Diagnosis Date    Acute congestive heart failure 3/20/2020    Alcohol abuse     Arthritis     Asthma attack 11/24/2021    Coronary artery disease     Diabetes mellitus     Hyperlipemia     Hypertension     Multiple thyroid nodules 6/14/2023    Rhabdomyolysis          Past Surgical History:   Procedure Laterality Date    ECHOCARDIOGRAM,TRANSESOPHAGEAL N/A 9/21/2023    Procedure: Transesophageal echo (MARIA) intra-procedure log documentation;  Surgeon: Luisana Rodríguez MD;  Location: [facility] CATH LAB;  Service: Cardiology;  Laterality: N/A;    EYE SURGERY      IRRIGATION AND DEBRIDEMENT Bilateral 12/1/2023    Procedure: IRRIGATION AND DEBRIDEMENT;  Surgeon: Jenna Gutiérrez DPM;  Location: [facility] OR;  Service: Podiatry;  Laterality: Bilateral;  Request antibiotic beads    TRANSESOPHAGEAL ECHOCARDIOGRAM WITH POSSIBLE CARDIOVERSION (MARIA W/ POSS CARDIOVERSION) N/A 1/5/2023    Procedure: Transesophageal echo (MARIA) intra-procedure log documentation;  Surgeon: Indra Foote MD;  Location: [facility] CATH LAB;  Service: Cardiology;  Laterality: N/A;    TRANSESOPHAGEAL ECHOCARDIOGRAPHY N/A 9/21/2023    Procedure: ECHOCARDIOGRAM, TRANSESOPHAGEAL;  Surgeon: Luisana Rodríguez MD;  Location: [facility] CATH LAB;  Service: Cardiology;  Laterality: N/A;    TREATMENT OF CARDIAC ARRHYTHMIA N/A 12/7/2020    Procedure: Cardioversion or Defibrillation;  Surgeon: Indra Foote MD;  Location: [facility] CATH LAB;  Service: Cardiology;  Laterality: N/A;    TREATMENT OF CARDIAC ARRHYTHMIA N/A 12/30/2020    Procedure: Cardioversion or Defibrillation;  Surgeon: Tyrone Steen MD;  Location: [facility] CATH LAB;  Service: Cardiology;  Laterality: N/A;    TREATMENT OF CARDIAC ARRHYTHMIA N/A 1/5/2023    Procedure: Cardioversion or Defibrillation;  Surgeon: Indra Foote MD;  Location: [facility] CATH LAB;   Service: Cardiology;  Laterality: N/A;    VASCULAR SURGERY         Time Tracking:     OT Date of Treatment: 01/07/24  OT Start Time: 0931  OT Stop Time: 0958  OT Total Time (min): 27 min    Billable Minutes:Evaluation 10 min  Self Care/Home Management 17 min    1/7/2024

## 2024-01-07 NOTE — PLAN OF CARE
Problem: Occupational Therapy  Goal: Occupational Therapy Goal  Description: Goals to be met by: 2/7/24 (1 month)     Patient will increase functional independence with ADLs by performing:    Feeding with Supervision.  Grooming while seated with Stand-by Assistance.  Toileting from bedside commode with Moderate Assistance for hygiene and clothing management.   Sitting at edge of bed x10 minutes with Stand-by Assistance.  Rolling to Bilateral with Minimal Assistance.   Squat pivot transfers with Moderate Assistance.  Toilet transfer to bedside commode with Moderate Assistance.    Evaluated pt and established OT POC. Continue OT as tolerated.  Alyson Robledo OT  1/7/2024    Outcome: Ongoing, Progressing

## 2024-01-08 LAB
ALBUMIN SERPL BCP-MCNC: 1.9 G/DL (ref 3.5–5.2)
ALP SERPL-CCNC: 78 U/L (ref 55–135)
ALT SERPL W/O P-5'-P-CCNC: 7 U/L (ref 10–44)
ANION GAP SERPL CALC-SCNC: 11 MMOL/L (ref 8–16)
AST SERPL-CCNC: 22 U/L (ref 10–40)
BASOPHILS # BLD AUTO: 0.09 K/UL (ref 0–0.2)
BASOPHILS NFR BLD: 0.9 % (ref 0–1.9)
BILIRUB SERPL-MCNC: 0.6 MG/DL (ref 0.1–1)
BUN SERPL-MCNC: 61 MG/DL (ref 8–23)
CALCIUM SERPL-MCNC: 8.8 MG/DL (ref 8.7–10.5)
CHLORIDE SERPL-SCNC: 110 MMOL/L (ref 95–110)
CO2 SERPL-SCNC: 22 MMOL/L (ref 23–29)
CREAT SERPL-MCNC: 5.5 MG/DL (ref 0.5–1.4)
DIFFERENTIAL METHOD BLD: ABNORMAL
EOSINOPHIL # BLD AUTO: 0.3 K/UL (ref 0–0.5)
EOSINOPHIL NFR BLD: 3.5 % (ref 0–8)
ERYTHROCYTE [DISTWIDTH] IN BLOOD BY AUTOMATED COUNT: 18.1 % (ref 11.5–14.5)
EST. GFR  (NO RACE VARIABLE): 10.3 ML/MIN/1.73 M^2
GLUCOSE SERPL-MCNC: 77 MG/DL (ref 70–110)
HAV IGM SERPL QL IA: NORMAL
HBV CORE AB SERPL QL IA: NORMAL
HBV SURFACE AB SER-ACNC: 38.18 MIU/ML
HBV SURFACE AB SER-ACNC: REACTIVE M[IU]/ML
HBV SURFACE AG SERPL QL IA: NORMAL
HCT VFR BLD AUTO: 23.8 % (ref 40–54)
HGB BLD-MCNC: 7.5 G/DL (ref 14–18)
IMM GRANULOCYTES # BLD AUTO: 0.05 K/UL (ref 0–0.04)
IMM GRANULOCYTES NFR BLD AUTO: 0.5 % (ref 0–0.5)
LYMPHOCYTES # BLD AUTO: 1.2 K/UL (ref 1–4.8)
LYMPHOCYTES NFR BLD: 12.3 % (ref 18–48)
MAGNESIUM SERPL-MCNC: 2.4 MG/DL (ref 1.6–2.6)
MCH RBC QN AUTO: 26.4 PG (ref 27–31)
MCHC RBC AUTO-ENTMCNC: 31.5 G/DL (ref 32–36)
MCV RBC AUTO: 84 FL (ref 82–98)
MONOCYTES # BLD AUTO: 1.2 K/UL (ref 0.3–1)
MONOCYTES NFR BLD: 12.1 % (ref 4–15)
NEUTROPHILS # BLD AUTO: 6.9 K/UL (ref 1.8–7.7)
NEUTROPHILS NFR BLD: 70.7 % (ref 38–73)
NRBC BLD-RTO: 0 /100 WBC
PHOSPHATE SERPL-MCNC: 4.4 MG/DL (ref 2.7–4.5)
PLATELET # BLD AUTO: 225 K/UL (ref 150–450)
PMV BLD AUTO: 9 FL (ref 9.2–12.9)
POCT GLUCOSE: 119 MG/DL (ref 70–110)
POCT GLUCOSE: 128 MG/DL (ref 70–110)
POCT GLUCOSE: 143 MG/DL (ref 70–110)
POCT GLUCOSE: 97 MG/DL (ref 70–110)
POTASSIUM SERPL-SCNC: 3.7 MMOL/L (ref 3.5–5.1)
PROT SERPL-MCNC: 5.8 G/DL (ref 6–8.4)
RBC # BLD AUTO: 2.84 M/UL (ref 4.6–6.2)
SODIUM SERPL-SCNC: 143 MMOL/L (ref 136–145)
WBC # BLD AUTO: 9.72 K/UL (ref 3.9–12.7)

## 2024-01-08 PROCEDURE — 86709 HEPATITIS A IGM ANTIBODY: CPT | Performed by: HOSPITALIST

## 2024-01-08 PROCEDURE — 99497 ADVNCD CARE PLAN 30 MIN: CPT | Mod: ,,, | Performed by: STUDENT IN AN ORGANIZED HEALTH CARE EDUCATION/TRAINING PROGRAM

## 2024-01-08 PROCEDURE — 86706 HEP B SURFACE ANTIBODY: CPT | Performed by: HOSPITALIST

## 2024-01-08 PROCEDURE — 86704 HEP B CORE ANTIBODY TOTAL: CPT | Performed by: HOSPITALIST

## 2024-01-08 PROCEDURE — 99233 SBSQ HOSP IP/OBS HIGH 50: CPT | Mod: ,,, | Performed by: STUDENT IN AN ORGANIZED HEALTH CARE EDUCATION/TRAINING PROGRAM

## 2024-01-08 PROCEDURE — 84100 ASSAY OF PHOSPHORUS: CPT | Performed by: INTERNAL MEDICINE

## 2024-01-08 PROCEDURE — 86580 TB INTRADERMAL TEST: CPT | Performed by: HOSPITALIST

## 2024-01-08 PROCEDURE — 25000003 PHARM REV CODE 250: Performed by: INTERNAL MEDICINE

## 2024-01-08 PROCEDURE — 85025 COMPLETE CBC W/AUTO DIFF WBC: CPT | Performed by: INTERNAL MEDICINE

## 2024-01-08 PROCEDURE — 99233 SBSQ HOSP IP/OBS HIGH 50: CPT | Mod: ,,, | Performed by: INTERNAL MEDICINE

## 2024-01-08 PROCEDURE — 20600001 HC STEP DOWN PRIVATE ROOM

## 2024-01-08 PROCEDURE — 30200315 PPD INTRADERMAL TEST REV CODE 302: Performed by: HOSPITALIST

## 2024-01-08 PROCEDURE — 97535 SELF CARE MNGMENT TRAINING: CPT

## 2024-01-08 PROCEDURE — 87340 HEPATITIS B SURFACE AG IA: CPT | Performed by: HOSPITALIST

## 2024-01-08 PROCEDURE — 25000003 PHARM REV CODE 250

## 2024-01-08 PROCEDURE — 25000003 PHARM REV CODE 250: Performed by: STUDENT IN AN ORGANIZED HEALTH CARE EDUCATION/TRAINING PROGRAM

## 2024-01-08 PROCEDURE — 83735 ASSAY OF MAGNESIUM: CPT | Performed by: INTERNAL MEDICINE

## 2024-01-08 PROCEDURE — 80053 COMPREHEN METABOLIC PANEL: CPT | Performed by: INTERNAL MEDICINE

## 2024-01-08 PROCEDURE — 25000003 PHARM REV CODE 250: Performed by: HOSPITALIST

## 2024-01-08 PROCEDURE — 92526 ORAL FUNCTION THERAPY: CPT

## 2024-01-08 PROCEDURE — 27000207 HC ISOLATION

## 2024-01-08 RX ORDER — SODIUM CHLORIDE 9 MG/ML
INJECTION, SOLUTION INTRAVENOUS CONTINUOUS
Status: CANCELLED | OUTPATIENT
Start: 2024-01-08 | End: 2024-01-08

## 2024-01-08 RX ORDER — SODIUM CHLORIDE 0.9 % (FLUSH) 0.9 %
10 SYRINGE (ML) INJECTION
Status: DISCONTINUED | OUTPATIENT
Start: 2024-01-08 | End: 2024-01-19 | Stop reason: HOSPADM

## 2024-01-08 RX ADMIN — APIXABAN 5 MG: 5 TABLET, FILM COATED ORAL at 09:01

## 2024-01-08 RX ADMIN — ACETAMINOPHEN 1000 MG: 500 TABLET ORAL at 05:01

## 2024-01-08 RX ADMIN — ATORVASTATIN CALCIUM 80 MG: 40 TABLET, FILM COATED ORAL at 09:01

## 2024-01-08 RX ADMIN — FOLIC ACID 1 MG: 1 TABLET ORAL at 09:01

## 2024-01-08 RX ADMIN — ACETAMINOPHEN 1000 MG: 500 TABLET ORAL at 03:01

## 2024-01-08 RX ADMIN — ALLOPURINOL 50 MG: 300 TABLET ORAL at 09:01

## 2024-01-08 RX ADMIN — NIFEDIPINE 30 MG: 30 TABLET, FILM COATED, EXTENDED RELEASE ORAL at 09:01

## 2024-01-08 RX ADMIN — FAMOTIDINE 20 MG: 20 TABLET, FILM COATED ORAL at 09:01

## 2024-01-08 RX ADMIN — TUBERCULIN PURIFIED PROTEIN DERIVATIVE 5 UNITS: 5 INJECTION, SOLUTION INTRADERMAL at 04:01

## 2024-01-08 NOTE — PROGRESS NOTES
[facility] Hospital Medicine  Progress Note    Primary Team: [facility]  Admit Date: 12/28/2023    Subjective:      Hospital Course:   Admitted for CHB which EP thought to be 2/2 uremia/ELIZABETH and infection. Has not required pacing and is HDS. Recommended holding AV iker agents. BUN and sCr newly elevated, seen by nephrology who started dialysis for clearance. Tolerated dialysis with plans to further dialyze. ID recommended erta x 7 days for ESBL EColi UTI. Continued daptomycin for his BL heel OM.  C/b delirium for which CT head which was unremarkable. Stepped down to  but came back to CCU after he went into torsades on 12/31. CPR was initiated and pt regained pulse and consciousness; bradycardic post event with HR ~50. Electrolytes replaced. TVP placed in CCU; spoke with MPOA who confirmed a DNR status but that she would want a pacemaker placed. EP consulted for PPM insertion. Had another episode of torsades (~ 2 mins) that became CHB and then was paced to NSR. TVP placed. Palliative care consulted for GOC/ACP. Pt more alerted and oriented, states he would like to proceed with the procedure, Micra pacemaker placed on 1/5. Bleeding from groin site with BPs in 80/50s that recovered with 1 L NS bolus. 1 unit prbc as given for Hb 7.3. Stepped down to hospital medicine 1/5.     Interval History:  Hb stable. Cr stable. Expresses no complaints. Worked with PTOT yesterday.  Interventional Nephro planning TDC on Thurs. Discussed plan of care with family over phone yesterday. Hold eliquis, change to heparin gtt until procedure    ROS:  As per HPI  General: no fever, no chills, no weight loss, no fatigue  Cardiovascular: no chest pain, no orthopnea, no dyspnea  Respiratory: no cough, no wheezes, no SOB  All other systems reviewed & are negative.     Past Medical History:   Diagnosis Date    Acute congestive heart failure 3/20/2020    Alcohol abuse     Arthritis     Asthma attack 11/24/2021    Coronary  artery disease     Diabetes mellitus     Hyperlipemia     Hypertension     Multiple thyroid nodules 6/14/2023    Rhabdomyolysis      Past Surgical History:   Procedure Laterality Date    ECHOCARDIOGRAM,TRANSESOPHAGEAL N/A 9/21/2023    Procedure: Transesophageal echo (MARIA) intra-procedure log documentation;  Surgeon: Luisana Rodríguez MD;  Location: [facility] CATH LAB;  Service: Cardiology;  Laterality: N/A;    EYE SURGERY      IMPLANTATION OF LEADLESS PACEMAKER N/A 1/5/2024    Procedure: INSERTION, CARDIAC PACEMAKER, LEADLESS;  Surgeon: Karl Marin MD;  Location: [facility] EP LAB;  Service: Cardiology;  Laterality: N/A;  CHB, [place] PPM (Micra), MDT, ANES, SK, CICU [id number]    IRRIGATION AND DEBRIDEMENT Bilateral 12/1/2023    Procedure: IRRIGATION AND DEBRIDEMENT;  Surgeon: Jenna Gutiérrez DPM;  Location: [facility] OR;  Service: Podiatry;  Laterality: Bilateral;  Request antibiotic beads    TRANSESOPHAGEAL ECHOCARDIOGRAM WITH POSSIBLE CARDIOVERSION (MARIA W/ POSS CARDIOVERSION) N/A 1/5/2023    Procedure: Transesophageal echo (MARIA) intra-procedure log documentation;  Surgeon: Indra Foote MD;  Location: [facility] CATH LAB;  Service: Cardiology;  Laterality: N/A;    TRANSESOPHAGEAL ECHOCARDIOGRAPHY N/A 9/21/2023    Procedure: ECHOCARDIOGRAM, TRANSESOPHAGEAL;  Surgeon: Luisana Rodríguez MD;  Location: [facility] CATH LAB;  Service: Cardiology;  Laterality: N/A;    TREATMENT OF CARDIAC ARRHYTHMIA N/A 12/7/2020    Procedure: Cardioversion or Defibrillation;  Surgeon: Indra Foote MD;  Location: [facility] CATH LAB;  Service: Cardiology;  Laterality: N/A;    TREATMENT OF CARDIAC ARRHYTHMIA N/A 12/30/2020    Procedure: Cardioversion or Defibrillation;  Surgeon: Tyrone Steen MD;  Location: [facility] CATH LAB;  Service: Cardiology;  Laterality: N/A;    TREATMENT OF CARDIAC ARRHYTHMIA N/A 1/5/2023    Procedure: Cardioversion or Defibrillation;  Surgeon: Indra Foote MD;  Location: [facility] CATH LAB;  Service: Cardiology;  Laterality: N/A;     VASCULAR SURGERY           Objective:   Last 24 Hour Vital Signs:  BP  Min: 116/56  Max: 165/70  Temp  Av.2 °F (36.8 °C)  Min: 97.3 °F (36.3 °C)  Max: 98.9 °F (37.2 °C)  Pulse  Av.3  Min: 60  Max: 72  Resp  Av  Min: 18  Max: 20  SpO2  Av.3 %  Min: 98 %  Max: 99 %  I/O last 3 completed shifts:  In: 480 [P.O.:480]  Out: 675 [Urine:675]    Physical Examination:  GEN: AAOx2 (person, place, not situation), NAD  HEENT: NCAT, MMM, PERRL, EOMI, oropharynx clear, RIJ trialysis catheter  CV: RRR, systolic ejection murmur  RESP: CTAB, no wheezes/crackles, no increased WOB  ABD: soft, NTND, normoactive BS, no organomegaly  EXTR: no c/c, intact distal pulses x 4, groin dressing c/d/I w/o bleeding, mild pitting edema BLE  : [place] in place  NEURO: PERRL, EOMI, moving all four extremities, intact sensation to light touch, no focal deficits  SKIN: no rashes, lesions, or color changes  PSYCH: normal affect    Laboratory:  I have reviewed all pertinent lab results/findings within the past 24 hours.    Radiology:  I have reviewed all pertinent imaging results/findings within the past 24 hours.    Current Medications:     Infusions:   sodium chloride 0.9% Stopped (24 1455)        Scheduled:   acetaminophen  1,000 mg Oral Q8H    allopurinoL  50 mg Oral Daily    atorvastatin  80 mg Oral Daily    famotidine  20 mg Oral Daily    folic acid  1 mg Oral Daily    LIDOcaine  1 patch Transdermal Q24H    NIFEdipine  30 mg Oral Daily        PRN:  0.9%  NaCl infusion (for blood administration), 0.9%  NaCl infusion (for blood administration), 0.9%  NaCl infusion (for blood administration), sodium chloride 0.9%, benzonatate, dextrose 10%, dextrose 10%, glucagon (human recombinant), glucose, glucose, heparin (porcine) 2,000 Units in sodium chloride 0.9% 1,000 mL, heparin (porcine), heparin (porcine), insulin aspart U-100, LIDOcaine HCL 20 mg/ml (2%), magnesium oxide, naloxone, senna-docusate 8.6-50 mg, sodium chloride 0.9%,  sodium chloride 0.9%    Prior records reviewed.       Assessment/Plan:     Chato Bowie is a 73 y.o.male with    Acute renal failure superimposed on stage 3b chronic kidney disease  Admitted with ELIZABETH on CKD  Initial  with sCr 7.5; improving with dialysis  Nephrology consulted, started on HD for clearance  Still making some urine  Dialysis per nephro  Nephro recommends TDC placement and HD chair. Interventional Nephro planning on Thurs    Anemia of chronic disease  -s/p 1 unit earlier this admission  -Required another unit 1/5 in setting of groin site bleeding that has now resolved. 1 more unit 1/6.   -No overt bleeding  -Hb stable now    Complete heart block  Patient with complete heart block, previous EKG with first degree AV block and LBBB  Patient on metoprolol and amiodarone as an outpatient  HR in the 30s on admission  EP consulted, thought it could be 2/2 uremia/ELIZABETH/infection; held off on TVP initially  Stepped down to HM with plans to see how he improved with dialysis  However went into torsades on 12/31  TVP placed by IC; s/p Micra insertion with EP on 1/5  Hold AV iker agents     Torsades de pointes  Went into torsades overnight 12/31; HR went to 160-170, then lost consciousness and pulse. CPR initiated, Pt regained consciousness and pulse then was transferred back to CCU  Received Mg and K although levels were not critical  Medications reviewed - no obvious meds that could be prolonging QTc  TVP placed by IC  Spoke with SID who confirmed DNR code status but she is happy for a PPM to be inserted  Had another episode of torsades on 1/1 for about 2 mins which then converted to CHB which he was paced out of into a HR ~ 65; spoke with EP who advised to overpace him  Remain on telemetry  Keep K > 4, Mg > 2  Will need to check with EP what other medications they may want given the TdP     Osteomyelitis of foot  Admitted on 11/27 from NH for b/l wounds. 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  Wound swab of R ulcer with MRSA.   s/p bone biopsy and debridement by podiatry   On 6 weeks of IV abx Dapto with end date end of 1/10/2024     Advanced care planning/counseling discussion  Cardiology team spoke with SID over the phone who confirmed DNR status however advised that she would like a PPM inserted     Acute encephalopathy  Likely uremic encephalopathy in the setting of ARF with  and asterixis on exam. Infections (UTI and OM) could also be contributing  Remains disoriented but alertness improved, answers some questions appropriately. Oriented to place and month.   Currently able to protect airway  CT head unremarkable  SLP advised for minced/moist diet  Delirium precautions     UTI (urinary tract infection)  Suspect this patient has complicated cystitis given retention and positive UA  UCx growing multi drug resistant ESBL E coli  ID following, started on ertapenem, finished 7 day course,     Paroxysmal atrial flutter  On eliquis at home. Hold for TDC placement.      Severe aortic valve stenosis  TTE:  Left Ventricle: The left ventricle is normal in size. Ventricular mass is normal. Normal wall thickness. There is concentric remodeling. Septal motion is consistent with bundle branch block. There is normal systolic function with a visually estimated ejection fraction of 55 - 60%. Grade II diastolic dysfunction.    Right Ventricle: Normal right ventricular cavity size. Systolic function is normal.    Left Atrium: Left atrium is severely dilated.    Aortic Valve: There is moderate aortic valve sclerosis. Moderately restricted motion. There is severe stenosis. Aortic valve area by VTI is 0.86 cm². Aortic valve peak velocity is 4.12 m/s. Mean gradient is 43 mmHg. The dimensionless index is 0.22. There is mild to moderate aortic regurgitation.    Mitral Valve: There is bileaflet sclerosis. There is mild annular dilation present.    Tricuspid Valve: There is mild to moderate regurgitation.    Pulmonary  Artery: The estimated pulmonary artery systolic pressure is 72 mmHg.    IVC/SVC: Intermediate venous pressure at 8 mmHg.       GERD (gastroesophageal reflux disease)  Continue famotidine     Type 2 diabetes mellitus with kidney complication, with long-term current use of insulin  Last HbA1c 5.5  POCT TIDWM and prior to bed  LDSSI  Monitor BGLs, start long acting insulin if needed     Dyslipidemia  Continue statin     Epigastric abdominal pain  CTAP showing cholelithiasis, no evidence of acute cholecystitis  RESOLVED      Marilee Rivas MD  Hospital Medicine Staff  [facility]

## 2024-01-08 NOTE — PLAN OF CARE
Problem: Infection  Goal: Absence of Infection Signs and Symptoms  1/8/2024 0401 by Pat Rodríguez RN  Outcome: Ongoing, Progressing  1/8/2024 0231 by Pat Rodríguez RN  Outcome: Ongoing, Progressing     Problem: Adult Inpatient Plan of Care  Goal: Plan of Care Review  1/8/2024 0401 by Pat Rodríguez RN  Outcome: Ongoing, Progressing  1/8/2024 0231 by Pat Rodríguez RN  Outcome: Ongoing, Progressing  Goal: Absence of Hospital-Acquired Illness or Injury  1/8/2024 0401 by Pat Rodríguez RN  Outcome: Ongoing, Progressing  1/8/2024 0231 by Pat Rodríguez RN  Outcome: Ongoing, Progressing  Goal: Optimal Comfort and Wellbeing  1/8/2024 0401 by Pat Rodríguez RN  Outcome: Ongoing, Progressing  1/8/2024 0231 by Pat Rodríguez RN  Outcome: Ongoing, Progressing  Goal: Readiness for Transition of Care  Outcome: Ongoing, Progressing     Problem: Infection (Hemodialysis)  Goal: Absence of Infection Signs and Symptoms  Outcome: Ongoing, Progressing     Problem: Diabetes Comorbidity  Goal: Blood Glucose Level Within Targeted Range  Outcome: Ongoing, Progressing     Problem: Fluid and Electrolyte Imbalance (Acute Kidney Injury/Impairment)  Goal: Fluid and Electrolyte Balance  1/8/2024 0401 by Pat Rodríguez RN  Outcome: Ongoing, Progressing  1/8/2024 0231 by Pat Rodríguez RN  Outcome: Ongoing, Progressing     Problem: Oral Intake Inadequate (Acute Kidney Injury/Impairment)  Goal: Optimal Nutrition Intake  Outcome: Ongoing, Progressing     Problem: Renal Function Impairment (Acute Kidney Injury/Impairment)  Goal: Effective Renal Function  1/8/2024 0401 by Pat Rodríguez RN  Outcome: Ongoing, Progressing  1/8/2024 0231 by Pat Rodríguez RN  Outcome: Ongoing, Progressing     Problem: Skin Injury Risk Increased  Goal: Skin Health and Integrity  1/8/2024 0401 by Pat Rodríguez RN  Outcome: Ongoing, Progressing  1/8/2024 0231 by Pat Rodríguez RN  Outcome: Ongoing, Progressing

## 2024-01-08 NOTE — ASSESSMENT & PLAN NOTE
73m with CKD3 presented in complete heart block with ELIZABETH, underwent TVP--> micra as well as had trialysis line placed for iHD. Renal function remains poor but not acutely worsening (BL cr 2-3, now ~5.5). Continues to have -700cc/day.    Nephrology recommending tunneled dialysis catheter placement and vascular access planning. Given patient's ongoing clinical decline and frailty with 10+ hospital admissions over the past year, now bedbound status (past several months), and poor quality of life, considerable concern should be given that he may not be a good outpatient HD candidate.     -will continue to explore this with patient's MPOA and attempt to set up family meeting  -appreciate nephrology expertise in commenting on pros/cons of initiating HD in this frail patient in terms of both prognosis and impact on quality of life

## 2024-01-08 NOTE — PT/OT/SLP PROGRESS
"Speech Language Pathology Treatment/Discharge    Patient Name:  Chato Bowie   MRN:  [id number]  Admitting Diagnosis: Acute renal failure superimposed on stage 3b chronic kidney disease    Recommendations:                 General Recommendations:  Follow-up not indicated  Diet recommendations:  Soft & Bite Sized Diet - IDDSI Level 6, Liquid Diet Level: Thin liquids - IDDSI Level 0   Aspiration Precautions: 1 bite/sip at a time, Alternating bites/sips, set-up assistance with meals, Avoid talking while eating, Frequent oral care, HOB to 90 degrees, Monitor for s/s of aspiration, Small bites/sips, and Standard aspiration precautions   General Precautions: Standard, aspiration, fall  Communication strategies:  go to room if call light pushed    Assessment:     Chato Bowie is a 73 y.o. male.  Swallowing abilities appear to be at baseline. Pt appropriate to continue on soft and bite size diet with thin liquids. Aspiration precautions should continue to be followed.  No further skilled SLP services warranted at this time.     Subjective     "If I can just wipe my face with a wet washcloth, I'll be alright."     Pain/Comfort:  Pain Rating 1: 0/10    Respiratory Status: Room air    Objective:     Has the patient been evaluated by SLP for swallowing?   Yes  Keep patient NPO? No   Current Respiratory Status:        Pt agreeable to SLP observing with PO intake to monitor diet tolerance.  Breakfast tray recently delivered. Pt able to tolerate HOB elevated to 45 degrees for PO intake.  Breakfast tray was set up and some assistance provided with feeding initially, but pt able to take over and continue to feed self.  Pt observed consuming juice and water via straw (approx 8 oz observed), bites of scrambled eggs, grits, chopped sausage, and diced peaches.  Oral and pharyngeal management was WFL. Slight throat clear x 1 observed after straw sip of water, but no other instances observed over consumption of 8 oz of thin liquids.  SLP " recommends that pt continue on current diet with aspiration precautions. Education was provided to pt regarding role of SLP, monitoring diet tolerance, aspiration precautions, elevating HOB as much as tolerated during PO intake, and no need for further skilled SLP services at this time.  Pt demonstrated understanding.         Plan:     Plan of Care reviewed with:  patient   SLP Follow-Up:  No       Discharge recommendations:   (no further skilled SLP services warranted at this time to address swallowing abilities)     Time Tracking:     SLP Treatment Date:   01/08/24  Speech Start Time:  0833  Speech Stop Time:  0851     Speech Total Time (min):  18 min    Billable Minutes: Treatment Swallowing Dysfunction 10 and Self Care/Home Management Training 8    01/08/2024

## 2024-01-08 NOTE — ASSESSMENT & PLAN NOTE
Insight/goals of care- fair, understands limited nature of his life expectancy. Initially discussed implications of not having permanent pacemaker placed, which would likely result in very limited life expectancy which did not seem acceptable to patient or his niece. Subsequently with conversations regarding other potential limits we might place on patient's care as he continues to decline including HD.    Social support- Gulshan is SID, patient in nursing home prior to admit, unclear what level of support at home     Spiritual- did not assess    Symptom management- pain related to chronic LE wounds, pain well-controlled with current meds      Recommendations  -DNR, full support  -Leigh Gaffney is MPOA, no secondary decision-makers (sister is living but with dementia)  -ongoing goals of care discussions regarding HD in this patient with poor functional status and progressive clinical decline over the past year  -continue oxycodone 10mg q4hr PRN for pain, hydromorphone 0.5mg IV q6hr PRN for breakthrough/wound care

## 2024-01-08 NOTE — PLAN OF CARE
Seen and examined at bedside, no apparent distress. He will likely need TC and initiate dialysis prior to DC.  Will keep monitoring kidney function while inpatient.

## 2024-01-08 NOTE — PLAN OF CARE
Problem: SLP  Goal: SLP Goal  Description: Speech Language Pathology Goals  Goals expected to be met by 1/9:   1. Pt will tolerate the least restrictive diet without s/s of aspiration.           Outcome: Met  Pt tolerating soft & bite size diet and thin liquids. Aspiration precautions should continue to be followed.  No further skilled SLP services warranted at this time.

## 2024-01-08 NOTE — SUBJECTIVE & OBJECTIVE
Interval History:   Renal function stable today. sCr 5.5. Other electrolytes non emergent. 24 hr  mL. Mentation remain poor on exam. Disoriented during exam. Last HD on 1/3.     Review of patient's allergies indicates:  No Known Allergies  Current Facility-Administered Medications   Medication Frequency    0.9%  NaCl infusion (for blood administration) Q24H PRN    0.9%  NaCl infusion (for blood administration) Q24H PRN    0.9%  NaCl infusion (for blood administration) Q24H PRN    0.9%  NaCl infusion Continuous    0.9%  NaCl infusion PRN    acetaminophen tablet 1,000 mg Q8H    allopurinol split tablet 50 mg Daily    apixaban tablet 5 mg BID    atorvastatin tablet 80 mg Daily    benzonatate capsule 100 mg TID PRN    dextrose 10% bolus 125 mL 125 mL PRN    dextrose 10% bolus 250 mL 250 mL PRN    famotidine tablet 20 mg Daily    folic acid tablet 1 mg Daily    glucagon (human recombinant) injection 1 mg PRN    glucose chewable tablet 16 g PRN    glucose chewable tablet 24 g PRN    heparin (porcine) 2,000 Units in sodium chloride 0.9% 1,000 mL PRN    heparin (porcine) injection 1,000 Units PRN    heparin infusion 1,000 units/500 ml in 0.9% NaCl (on sterile field) PRN    insulin aspart U-100 pen 0-5 Units QID (AC + HS) PRN    LIDOcaine 5 % patch 1 patch Q24H    LIDOcaine HCL 20 mg/ml (2%) injection PRN    magnesium oxide tablet 800 mg PRN    naloxone 0.4 mg/mL injection 0.02 mg PRN    NIFEdipine 24 hr tablet 30 mg Daily    senna-docusate 8.6-50 mg per tablet 2 tablet BID PRN    sodium chloride 0.9% bolus 250 mL 250 mL PRN    sodium chloride 0.9% flush 10 mL Q12H PRN       Objective:     Vital Signs (Most Recent):  Temp: 98.2 °F (36.8 °C) (01/08/24 0812)  Pulse: 62 (01/08/24 1124)  Resp: 20 (01/08/24 0812)  BP: 126/60 (01/08/24 0812)  SpO2: 98 % (01/08/24 0445) Vital Signs (24h Range):  Temp:  [97.3 °F (36.3 °C)-98.7 °F (37.1 °C)] 98.2 °F (36.8 °C)  Pulse:  [60-72] 62  Resp:  [18-20] 20  SpO2:  [98 %-99 %] 98 %  BP:  (120-165)/(56-70) 126/60     Weight: 80.7 kg (178 lb) (12/29/23 0703)  Body mass index is 24.83 kg/m².  Body surface area is 2.01 meters squared.    I/O last 3 completed shifts:  In: 480 [P.O.:480]  Out: 675 [Urine:675]     Physical Exam  Vitals and nursing note reviewed.   Constitutional:       General: He is not in acute distress.     Appearance: He is ill-appearing. He is not toxic-appearing.      Interventions: Nasal cannula in place.   HENT:      Head: Normocephalic and atraumatic.      Nose: Nose normal.      Mouth/Throat:      Mouth: Mucous membranes are dry.      Pharynx: No oropharyngeal exudate.   Eyes:      General: No scleral icterus.        Right eye: No discharge.         Left eye: No discharge.   Cardiovascular:      Rate and Rhythm: Normal rate.   Pulmonary:      Effort: Pulmonary effort is normal. No respiratory distress.      Breath sounds: No wheezing or rales.   Abdominal:      General: Abdomen is flat.   Musculoskeletal:      Cervical back: Normal range of motion. No rigidity.      Right lower leg: No edema.      Left lower leg: Edema present.   Lymphadenopathy:      Cervical: No cervical adenopathy.   Neurological:      Mental Status: He is alert and easily aroused.      Comments: Alert, slow to respond   Psychiatric:         Behavior: Behavior is cooperative.          Significant Labs:  CBC:   Recent Labs   Lab 01/08/24  0445   WBC 9.72   RBC 2.84*   HGB 7.5*   HCT 23.8*      MCV 84   MCH 26.4*   MCHC 31.5*     CMP:   Recent Labs   Lab 01/08/24  0445   GLU 77   CALCIUM 8.8   ALBUMIN 1.9*   PROT 5.8*      K 3.7   CO2 22*      BUN 61*   CREATININE 5.5*   ALKPHOS 78   ALT 7*   AST 22   BILITOT 0.6     All labs within the past 24 hours have been reviewed.

## 2024-01-08 NOTE — SUBJECTIVE & OBJECTIVE
Interval History: Chart reviewed including 24h medication use. Patient resting comfortably in bed, somnolent but arousable, more difficult to understand today. Renal function still poor but stable.       Medications:  Continuous Infusions:   sodium chloride 0.9% Stopped (01/05/24 1455)     Scheduled Meds:   acetaminophen  1,000 mg Oral Q8H    allopurinoL  50 mg Oral Daily    apixaban  5 mg Oral BID    atorvastatin  80 mg Oral Daily    famotidine  20 mg Oral Daily    folic acid  1 mg Oral Daily    LIDOcaine  1 patch Transdermal Q24H    NIFEdipine  30 mg Oral Daily     PRN Meds:0.9%  NaCl infusion (for blood administration), 0.9%  NaCl infusion (for blood administration), 0.9%  NaCl infusion (for blood administration), sodium chloride 0.9%, benzonatate, dextrose 10%, dextrose 10%, glucagon (human recombinant), glucose, glucose, heparin (porcine) 2,000 Units in sodium chloride 0.9% 1,000 mL, heparin (porcine), heparin (porcine), insulin aspart U-100, LIDOcaine HCL 20 mg/ml (2%), magnesium oxide, naloxone, senna-docusate 8.6-50 mg, sodium chloride 0.9%, sodium chloride 0.9%    Objective:     Vital Signs (Most Recent):  Temp: 98.9 °F (37.2 °C) (01/08/24 1219)  Pulse: 61 (01/08/24 1219)  Resp: 18 (01/08/24 1219)  BP: (!) 116/56 (01/08/24 1219)  SpO2: 98 % (01/08/24 1219) Vital Signs (24h Range):  Temp:  [97.3 °F (36.3 °C)-98.9 °F (37.2 °C)] 98.9 °F (37.2 °C)  Pulse:  [60-72] 61  Resp:  [18-20] 18  SpO2:  [98 %-99 %] 98 %  BP: (116-165)/(56-70) 116/56     Weight: 80.7 kg (178 lb)  Body mass index is 24.83 kg/m².       Physical Exam  Vitals and nursing note reviewed.   Constitutional:       General: He is not in acute distress.     Appearance: He is ill-appearing. He is not toxic-appearing.   HENT:      Mouth/Throat:      Mouth: Mucous membranes are moist.   Eyes:      General: No scleral icterus.     Extraocular Movements: Extraocular movements intact.   Neck:      Comments:  trialysis right IJ  Pulmonary:      Effort:  Pulmonary effort is normal. No respiratory distress.   Abdominal:      General: Abdomen is flat.      Palpations: Abdomen is soft.   Skin:     General: Skin is warm and dry.   Neurological:      Mental Status: He is alert.      Comments: Incoherent at times but answers some complex questions, oriented to location and situation                Advance Care Planning   Advance Directives:   Medical Power of : Yes    Goals of Care: Engaged patient at bedside in conversation regarding his care moving forward. He is difficult to understand but answers some questions appropriately. I expressed that his kidneys are still very sick and he is being offered long-term dialysis, which would consist of surgeries to have access placed and typically involves 3 sessions per week lasting multiple hours. I shared that many patient's have a decreased quality of life with dialysis and reviewed our prior conversations during which we discussed his already poor quality of life and his dislike of spending time in the hospital. Patient was not overly forthcoming verbally but nodded that he did not want to go through that and nodded yes that he wanted to focus on time out of the hospital. Patient clearly unable to make complex decisions for himself at this time but these pieces of communication remain consistent with prior expressed values including those expressed by his MPOA. Unable to reach Leigh via telephone today despite multiple attempts        CBC:   Recent Labs   Lab 01/08/24  0445   WBC 9.72   HGB 7.5*   HCT 23.8*   MCV 84        BMP:  Recent Labs   Lab 01/08/24  0445   GLU 77      K 3.7      CO2 22*   BUN 61*   CREATININE 5.5*   CALCIUM 8.8   MG 2.4     LFT:  Lab Results   Component Value Date    AST 22 01/08/2024    ALKPHOS 78 01/08/2024    BILITOT 0.6 01/08/2024     Albumin:   Albumin   Date Value Ref Range Status   01/08/2024 1.9 (L) 3.5 - 5.2 g/dL Final     Protein:   Total Protein   Date Value Ref  Range Status   01/08/2024 5.8 (L) 6.0 - 8.4 g/dL Final     Lactic acid:   Lab Results   Component Value Date    LACTATE 0.8 12/30/2023    LACTATE 0.8 11/27/2023       Reviewed CBC with stable blood counts, CMP with poor but overall stable renal function, UOP remains adequate

## 2024-01-08 NOTE — PROGRESS NOTES
[facility] - Cardiology Step[facility]   Interventional Nephrology  Consult Note    Patient Name: Chato Bowie  MRN: [id number]  Admission Date: 12/28/2023  Hospital Length of Stay: 11 days  Attending Provider: Marilee Rivas MD   Primary Care Physician: Irlanda Valenzuela -  Principal Problem:Acute renal failure superimposed on stage 3b chronic kidney disease  IP consult to Interventional Nephrology  Consult performed by: Gregory Gallo MD  Consult ordered by: Marilee Rivas MD          Subjective:     HPI: 73 year old man with a history of longstanding T2DM, HTN, HFpEF, severe aortic stenosis, chronic bradycardia, CKD3b, newly diagnosed bilateral heel osteo on daptomycin admitted from nursing home for electrolyte abnormalities, bilateral flank pain, leg pain. Per report NH obtained labs had had a BUN of 170 for which he was transferred to [facility] for further evaluation. In the ED the BUN of 170 was confirmed along with a creatinine of 7.5 (baseline creatinine 2-2.5 as of 2 weeks ago). Of note, K is 3.6 with CO2 of 23. A jackson was placed with 500 purulent urine out and per family over phone had been dealing with some prostate issues in the past. UA revealed a specific gravity of 1.010, pH of 6, 2+ protein (in setting of ELIZABETH), 3+ leukocytes with many bacteria and WBC clumps.     Nephrology consulted for ELIZABETH - presumed ATN with possible HRS components as well in setting of pyelonephritis. Dialysis consent was obtained 12/28/23 and started on HD 12/29. Interventional nephrology consulted for tunneled dialysis catheter.     Review of Systems   Constitutional:  Negative for chills and fever.   HENT:  Negative for ear discharge and ear pain.    Eyes:  Negative for blurred vision and double vision.   Respiratory:  Negative for cough and shortness of breath.    Cardiovascular:  Negative for chest pain and palpitations.   Gastrointestinal:  Negative for abdominal pain, constipation, diarrhea, nausea and vomiting.   Genitourinary:  Negative  "for dysuria and frequency.   Musculoskeletal:  Negative for myalgias and neck pain.   Skin:  Negative for itching and rash.   Neurological:  Negative for tingling and headaches.     Physical Exam   Constitutional: He is oriented to person, place, and time. He appears well-developed and well-nourished.   HENT:   Head: Normocephalic and atraumatic.   Eyes: Pupils are equal, round, and reactive to light. Right eye exhibits no discharge. Left eye exhibits no discharge. No scleral icterus.   Cardiovascular: Normal rate and regular rhythm. Exam reveals no friction rub.   No murmur heard.Pulmonary:      Effort: Pulmonary effort is normal. No respiratory distress.      Breath sounds: Normal breath sounds. No stridor. No wheezing.     Abdominal: Soft. Bowel sounds are normal. He exhibits no distension. There is no abdominal tenderness.   Musculoskeletal:         General: No tenderness or deformity.      Cervical back: Normal range of motion and neck supple.   Neurological: He is alert and oriented to person, place, and time. No cranial nerve deficit.   Skin: Skin is warm and dry.          Assessment/Plan:     Acute Renal Failure on CKD 3b  Nephrology consulted for ELIZABETH on CKD, started on dialysis 12/29.   Per nephrology team 1/8/24 :  "-Patient will likely need RRT in the near future given recent ELIZABETH and CKD baseline prior to arrival. We feel it's best to either initiate HD while inpatient prior to discharge or complete vein mapping and clearances needed for AVF placement outpatient (within 1-2 weeks of dc) with plans for initiation while outpatient.   -Would recommend vascular consult for vein mapping and arrangement of AVF placement. Would recommend completing all clearances needed for AVF placement while inpatient. If unable to complete this while inpatient, would recommend IR consult for CVC placement and SW/CM consults for HD chair placement."    Tunneled dialysis catheter consent obtained 1/8/24  Apixiban needs to be " stopped for 3 days.   Case request for Thursday 1/11/2024 made.   Keep NPO night before  Obtain PT/INR and PTT day of      Thank you for your consult. I will follow-up with patient. Please contact us if you have any additional questions.    Gregory Gallo MD  Nephrology  [facility] - [facility] Step[facility]

## 2024-01-08 NOTE — PROGRESS NOTES
[facility]  Palliative Medicine  Progress Note    Patient Name: Chato Bowie  MRN: [id number]  Admission Date: 12/28/2023  Hospital Length of Stay: 11 days  Code Status: DNR   Attending Provider: Marilee Rivas MD  Consulting Provider: Jose Duval MD  Primary Care Physician: Irlanda Valenzuela -  Principal Problem:Acute renal failure superimposed on stage 3b chronic kidney disease    Patient information was obtained from patient, past medical records, and primary team.      Assessment/Plan:     Renal/  * Acute renal failure superimposed on stage 3b chronic kidney disease  73m with CKD3 presented in complete heart block with ELIZABETH, underwent TVP--> micra as well as had trialysis line placed for iHD. Renal function remains poor but not acutely worsening (BL cr 2-3, now ~5.5). Continues to have -700cc/day.    Nephrology recommending tunneled dialysis catheter placement and vascular access planning. Given patient's ongoing clinical decline and frailty with 10+ hospital admissions over the past year, now bedbound status (past several months), and poor quality of life, considerable concern should be given that he may not be a good outpatient HD candidate.     -will continue to explore this with patient's MPOA and attempt to set up family meeting  -appreciate nephrology expertise in commenting on pros/cons of initiating HD in this frail patient in terms of both prognosis and impact on quality of life    Palliative Care  Palliative care encounter  Insight/goals of care- fair, understands limited nature of his life expectancy. Initially discussed implications of not having permanent pacemaker placed, which would likely result in very limited life expectancy which did not seem acceptable to patient or his niece. Subsequently with conversations regarding other potential limits we might place on patient's care as he continues to decline including HD.    Social support- Niece is MPOA, patient in  nursing home prior to admit, unclear what level of support at home     Spiritual- did not assess    Symptom management- pain related to chronic LE wounds, pain well-controlled with current meds      Recommendations  -DNR, full support  -Leigh Gaffney is MPOA, no secondary decision-makers (sister is living but with dementia)  -ongoing goals of care discussions regarding HD in this patient with poor functional status and progressive clinical decline over the past year  -continue oxycodone 10mg q4hr PRN for pain, hydromorphone 0.5mg IV q6hr PRN for breakthrough/wound care        I will follow-up with patient. Please contact us if you have any additional questions.    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 Underwent HD x3, with improvement in mental status. EP consulted to consider pacemaker placement. Palliative care consulted to assist with goals of care.     Hospital Course:  No notes on file    Interval History: Chart reviewed including 24h medication use. Patient resting comfortably in bed, somnolent but arousable, more difficult to understand today. Renal function still poor but stable.       Medications:  Continuous Infusions:   sodium chloride 0.9% Stopped (01/05/24 9955)     Scheduled Meds:   acetaminophen  1,000 mg Oral Q8H    allopurinoL  50 mg Oral Daily    apixaban  5 mg Oral BID    atorvastatin  80 mg Oral Daily    famotidine  20 mg Oral Daily    folic  acid  1 mg Oral Daily    LIDOcaine  1 patch Transdermal Q24H    NIFEdipine  30 mg Oral Daily     PRN Meds:0.9%  NaCl infusion (for blood administration), 0.9%  NaCl infusion (for blood administration), 0.9%  NaCl infusion (for blood administration), sodium chloride 0.9%, benzonatate, dextrose 10%, dextrose 10%, glucagon (human recombinant), glucose, glucose, heparin (porcine) 2,000 Units in sodium chloride 0.9% 1,000 mL, heparin (porcine), heparin (porcine), insulin aspart U-100, LIDOcaine HCL 20 mg/ml (2%), magnesium oxide, naloxone, senna-docusate 8.6-50 mg, sodium chloride 0.9%, sodium chloride 0.9%    Objective:     Vital Signs (Most Recent):  Temp: 98.9 °F (37.2 °C) (01/08/24 1219)  Pulse: 61 (01/08/24 1219)  Resp: 18 (01/08/24 1219)  BP: (!) 116/56 (01/08/24 1219)  SpO2: 98 % (01/08/24 1219) Vital Signs (24h Range):  Temp:  [97.3 °F (36.3 °C)-98.9 °F (37.2 °C)] 98.9 °F (37.2 °C)  Pulse:  [60-72] 61  Resp:  [18-20] 18  SpO2:  [98 %-99 %] 98 %  BP: (116-165)/(56-70) 116/56     Weight: 80.7 kg (178 lb)  Body mass index is 24.83 kg/m².       Physical Exam  Vitals and nursing note reviewed.   Constitutional:       General: He is not in acute distress.     Appearance: He is ill-appearing. He is not toxic-appearing.   HENT:      Mouth/Throat:      Mouth: Mucous membranes are moist.   Eyes:      General: No scleral icterus.     Extraocular Movements: Extraocular movements intact.   Neck:      Comments:  trialysis right IJ  Pulmonary:      Effort: Pulmonary effort is normal. No respiratory distress.   Abdominal:      General: Abdomen is flat.      Palpations: Abdomen is soft.   Skin:     General: Skin is warm and dry.   Neurological:      Mental Status: He is alert.      Comments: Incoherent at times but answers some complex questions, oriented to location and situation                Advance Care Planning  Advance Directives:   Medical Power of : Yes    Goals of Care: Engaged patient at bedside in conversation  regarding his care moving forward. He is difficult to understand but answers some questions appropriately. I expressed that his kidneys are still very sick and he is being offered long-term dialysis, which would consist of surgeries to have access placed and typically involves 3 sessions per week lasting multiple hours. I shared that many patient's have a decreased quality of life with dialysis and reviewed our prior conversations during which we discussed his already poor quality of life and his dislike of spending time in the hospital. Patient was not overly forthcoming verbally but nodded that he did not want to go through that and nodded yes that he wanted to focus on time out of the hospital. Patient clearly unable to make complex decisions for himself at this time but these pieces of communication remain consistent with prior expressed values including those expressed by his MPOA. Unable to reach Leigh via telephone today despite multiple attempts        CBC:   Recent Labs   Lab 01/08/24 0445   WBC 9.72   HGB 7.5*   HCT 23.8*   MCV 84        BMP:  Recent Labs   Lab 01/08/24 0445   GLU 77      K 3.7      CO2 22*   BUN 61*   CREATININE 5.5*   CALCIUM 8.8   MG 2.4     LFT:  Lab Results   Component Value Date    AST 22 01/08/2024    ALKPHOS 78 01/08/2024    BILITOT 0.6 01/08/2024     Albumin:   Albumin   Date Value Ref Range Status   01/08/2024 1.9 (L) 3.5 - 5.2 g/dL Final     Protein:   Total Protein   Date Value Ref Range Status   01/08/2024 5.8 (L) 6.0 - 8.4 g/dL Final     Lactic acid:   Lab Results   Component Value Date    LACTATE 0.8 12/30/2023    LACTATE 0.8 11/27/2023       Reviewed CBC with stable blood counts, CMP with poor but overall stable renal function, UOP remains adequate    In my care of this patient with acute on chronic severe illness with threat to life and/or bodily function, I am recommending goal-concordant care as noted above. I spent a significant amount of time  reviewing external records/ recommendations of other providers, reviewing recent test results, and discussed care with other subspecialists involved    In addition to above, I spent 20 minutes specifically discussing advance care planning and goals of care with patient at bedside.       The above recommendations communicated directly to primary team on 1/8        Jose Duval MD  Palliative Medicine  [facility]

## 2024-01-08 NOTE — ASSESSMENT & PLAN NOTE
Baseline creatinine 2-2.5 (longstanding DM with bilateral osteomyelitis, HTN)  Multiple comorbitites as well     On admission serum creatinine 7.5  ELIZABETH appears to be multifactorial, possible prolonged pre-renal state/ATN from hemodynamic changes from CHB vs progressive CKD.   Obstruction relieved by jackson placed 12/28/23 with 500 cc of purulent UOP. Supposedly follows with urology in outpatient setting. Is on tamsulosin.   Pyelonephritis with UA showing 3+ leuks with many bacteria. History of proteus in past.   Low effective circulating volume suggested by physical exam, evidence of acute liver injury in setting of HFpEF with severe aortic stenosis. Home medication included metolazone 1 mg bid     Dialysis consent obtained 12/28/23  Renal US with no hydronephrosis   UCPR: 2.05  Azeem 22  Microalbumin / creatinine ratio: 647.3     Plan/Recommendation  -Patient will likely need RRT in the near future given recent ELIZABETH and CKD baseline prior to arrival. We feel it's best to either initiate HD while inpatient prior to discharge or complete vein mapping and clearances needed for AVF placement outpatient (within 1-2 weeks of dc) with plans for initiation while outpatient.   -Would recommend vascular consult for vein mapping and arrangement of AVF placement. Would recommend completing all clearances needed for AVF placement. If unable to complete this while inpatient, would recommend IR consult for CVC placement and SW/CM consults for HD chair placement.        -Continue jackson and monitor UOP       -No emergent need for clearance and/or volume removal today.   -Strict ins and outs  -Avoid nephrotoxic agents if possible and renally dose medications  -Avoid drastic hemodynamic changes if possible

## 2024-01-08 NOTE — PROGRESS NOTES
[facility]  Nephrology  Progress Note    Patient Name: Chato Bowie  MRN: [id number]  Admission Date: 12/28/2023  Hospital Length of Stay: 11 days  Attending Provider: Marilee Rivas MD   Primary Care Physician: Irlanda Valenzuela -  Principal Problem:Acute renal failure superimposed on stage 3b chronic kidney disease    Subjective:     Interval History:   Renal function stable today. sCr 5.5. Other electrolytes non emergent. 24 hr  mL. Mentation remain poor on exam. Disoriented during exam. Last HD on 1/3.     Review of patient's allergies indicates:  No Known Allergies  Current Facility-Administered Medications   Medication Frequency    0.9%  NaCl infusion (for blood administration) Q24H PRN    0.9%  NaCl infusion (for blood administration) Q24H PRN    0.9%  NaCl infusion (for blood administration) Q24H PRN    0.9%  NaCl infusion Continuous    0.9%  NaCl infusion PRN    acetaminophen tablet 1,000 mg Q8H    allopurinol split tablet 50 mg Daily    apixaban tablet 5 mg BID    atorvastatin tablet 80 mg Daily    benzonatate capsule 100 mg TID PRN    dextrose 10% bolus 125 mL 125 mL PRN    dextrose 10% bolus 250 mL 250 mL PRN    famotidine tablet 20 mg Daily    folic acid tablet 1 mg Daily    glucagon (human recombinant) injection 1 mg PRN    glucose chewable tablet 16 g PRN    glucose chewable tablet 24 g PRN    heparin (porcine) 2,000 Units in sodium chloride 0.9% 1,000 mL PRN    heparin (porcine) injection 1,000 Units PRN    heparin infusion 1,000 units/500 ml in 0.9% NaCl (on sterile field) PRN    insulin aspart U-100 pen 0-5 Units QID (AC + HS) PRN    LIDOcaine 5 % patch 1 patch Q24H    LIDOcaine HCL 20 mg/ml (2%) injection PRN    magnesium oxide tablet 800 mg PRN    naloxone 0.4 mg/mL injection 0.02 mg PRN    NIFEdipine 24 hr tablet 30 mg Daily    senna-docusate 8.6-50 mg per tablet 2 tablet BID PRN    sodium chloride 0.9% bolus 250 mL 250 mL PRN    sodium chloride 0.9% flush 10 mL Q12H  PRN       Objective:     Vital Signs (Most Recent):  Temp: 98.2 °F (36.8 °C) (01/08/24 0812)  Pulse: 62 (01/08/24 1124)  Resp: 20 (01/08/24 0812)  BP: 126/60 (01/08/24 0812)  SpO2: 98 % (01/08/24 0445) Vital Signs (24h Range):  Temp:  [97.3 °F (36.3 °C)-98.7 °F (37.1 °C)] 98.2 °F (36.8 °C)  Pulse:  [60-72] 62  Resp:  [18-20] 20  SpO2:  [98 %-99 %] 98 %  BP: (120-165)/(56-70) 126/60     Weight: 80.7 kg (178 lb) (12/29/23 0703)  Body mass index is 24.83 kg/m².  Body surface area is 2.01 meters squared.    I/O last 3 completed shifts:  In: 480 [P.O.:480]  Out: 675 [Urine:675]     Physical Exam  Vitals and nursing note reviewed.   Constitutional:       General: He is not in acute distress.     Appearance: He is ill-appearing. He is not toxic-appearing.      Interventions: Nasal cannula in place.   HENT:      Head: Normocephalic and atraumatic.      Nose: Nose normal.      Mouth/Throat:      Mouth: Mucous membranes are dry.      Pharynx: No oropharyngeal exudate.   Eyes:      General: No scleral icterus.        Right eye: No discharge.         Left eye: No discharge.   Cardiovascular:      Rate and Rhythm: Normal rate.   Pulmonary:      Effort: Pulmonary effort is normal. No respiratory distress.      Breath sounds: No wheezing or rales.   Abdominal:      General: Abdomen is flat.   Musculoskeletal:      Cervical back: Normal range of motion. No rigidity.      Right lower leg: No edema.      Left lower leg: Edema present.   Lymphadenopathy:      Cervical: No cervical adenopathy.   Neurological:      Mental Status: He is alert and easily aroused.      Comments: Alert, slow to respond   Psychiatric:         Behavior: Behavior is cooperative.          Significant Labs:  CBC:   Recent Labs   Lab 01/08/24  0445   WBC 9.72   RBC 2.84*   HGB 7.5*   HCT 23.8*      MCV 84   MCH 26.4*   MCHC 31.5*     CMP:   Recent Labs   Lab 01/08/24  0445   GLU 77   CALCIUM 8.8   ALBUMIN 1.9*   PROT 5.8*      K 3.7   CO2 22*   CL  110   BUN 61*   CREATININE 5.5*   ALKPHOS 78   ALT 7*   AST 22   BILITOT 0.6     All labs within the past 24 hours have been reviewed.   Assessment/Plan:     Cardiac/Vascular  Complete heart block  - defer to primary team     Renal/  * Acute renal failure superimposed on stage 3b chronic kidney disease  Baseline creatinine 2-2.5 (longstanding DM with bilateral osteomyelitis, HTN)  Multiple comorbitites as well     On admission serum creatinine 7.5  ELIZABETH appears to be multifactorial, possible prolonged pre-renal state/ATN from hemodynamic changes from CHB vs progressive CKD.   Obstruction relieved by jackson placed 12/28/23 with 500 cc of purulent UOP. Supposedly follows with urology in outpatient setting. Is on tamsulosin.   Pyelonephritis with UA showing 3+ leuks with many bacteria. History of proteus in past.   Low effective circulating volume suggested by physical exam, evidence of acute liver injury in setting of HFpEF with severe aortic stenosis. Home medication included metolazone 1 mg bid     Dialysis consent obtained 12/28/23  Renal US with no hydronephrosis   UCPR: 2.05  Azeem 22  Microalbumin / creatinine ratio: 647.3     Plan/Recommendation  -Patient will likely need RRT in the near future given recent ELIZABETH and CKD baseline prior to arrival. We feel it's best to either initiate HD while inpatient prior to discharge or complete vein mapping and clearances needed for AVF placement outpatient (within 1-2 weeks of dc) with plans for initiation while outpatient.   -Would recommend vascular consult for vein mapping and arrangement of AVF placement. Would recommend completing all clearances needed for AVF placement while inpatient. If unable to complete this while inpatient, would recommend IR consult for CVC placement and SW/CM consults for HD chair placement.        -Continue jackson and monitor UOP       -No emergent need for clearance and/or volume removal today.   -Strict ins and outs  -Avoid nephrotoxic agents  if possible and renally dose medications  -Avoid drastic hemodynamic changes if possible    UTI (urinary tract infection)  See ELIZABETH        Thank you for your consult. I will follow-up with patient. Please contact us if you have any additional questions.    Haleigh Camp DNP, FNP-C  Nephrology  [facility]

## 2024-01-08 NOTE — PLAN OF CARE
[facility]  Discharge Reassessment    Primary Care Provider: Irlanda Valenzuela -    Expected Discharge Date: 1/10/2024    Reassessment (most recent)       Discharge Reassessment - 01/08/24 1235          Discharge Reassessment    Assessment Type Discharge Planning Reassessment     Discharge Plan discussed with: Patient     Communicated ELY with patient/caregiver Date not available/Unable to determine     Discharge Plan A Return to nursing home     Discharge Plan B Hospice/home     DME Needed Upon Discharge  none     Transition of Care Barriers None     Why the patient remains in the hospital Requires continued medical care                 Goals of care discussions ongoing.  Discharge Plan A and Plan B have been determined by review of patient's clinical status, future medical and therapeutic needs, and coverage/benefits for post-acute care in coordination with multidisciplinary team members.  Will continue to follow.      Veena Lozoya LMSW  [facility]  [id number]

## 2024-01-09 LAB
ALBUMIN SERPL BCP-MCNC: 1.8 G/DL (ref 3.5–5.2)
ALP SERPL-CCNC: 85 U/L (ref 55–135)
ALT SERPL W/O P-5'-P-CCNC: 6 U/L (ref 10–44)
ANION GAP SERPL CALC-SCNC: 10 MMOL/L (ref 8–16)
APTT PPP: 36.2 SEC (ref 21–32)
APTT PPP: 36.2 SEC (ref 21–32)
APTT PPP: 90.2 SEC (ref 21–32)
AST SERPL-CCNC: 22 U/L (ref 10–40)
BASOPHILS # BLD AUTO: 0.08 K/UL (ref 0–0.2)
BASOPHILS NFR BLD: 0.9 % (ref 0–1.9)
BILIRUB SERPL-MCNC: 0.5 MG/DL (ref 0.1–1)
BUN SERPL-MCNC: 63 MG/DL (ref 8–23)
CALCIUM SERPL-MCNC: 8.3 MG/DL (ref 8.7–10.5)
CHLORIDE SERPL-SCNC: 106 MMOL/L (ref 95–110)
CO2 SERPL-SCNC: 24 MMOL/L (ref 23–29)
CREAT SERPL-MCNC: 5.2 MG/DL (ref 0.5–1.4)
DIFFERENTIAL METHOD BLD: ABNORMAL
EOSINOPHIL # BLD AUTO: 0.3 K/UL (ref 0–0.5)
EOSINOPHIL NFR BLD: 3.2 % (ref 0–8)
ERYTHROCYTE [DISTWIDTH] IN BLOOD BY AUTOMATED COUNT: 18.5 % (ref 11.5–14.5)
EST. GFR  (NO RACE VARIABLE): 11 ML/MIN/1.73 M^2
GLUCOSE SERPL-MCNC: 81 MG/DL (ref 70–110)
HCT VFR BLD AUTO: 23 % (ref 40–54)
HGB BLD-MCNC: 7.3 G/DL (ref 14–18)
IMM GRANULOCYTES # BLD AUTO: 0.06 K/UL (ref 0–0.04)
IMM GRANULOCYTES NFR BLD AUTO: 0.6 % (ref 0–0.5)
INR PPP: 1.1 (ref 0.8–1.2)
LYMPHOCYTES # BLD AUTO: 1.2 K/UL (ref 1–4.8)
LYMPHOCYTES NFR BLD: 12.5 % (ref 18–48)
MAGNESIUM SERPL-MCNC: 2.2 MG/DL (ref 1.6–2.6)
MCH RBC QN AUTO: 26.6 PG (ref 27–31)
MCHC RBC AUTO-ENTMCNC: 31.7 G/DL (ref 32–36)
MCV RBC AUTO: 84 FL (ref 82–98)
MONOCYTES # BLD AUTO: 1.2 K/UL (ref 0.3–1)
MONOCYTES NFR BLD: 12.6 % (ref 4–15)
NEUTROPHILS # BLD AUTO: 6.6 K/UL (ref 1.8–7.7)
NEUTROPHILS NFR BLD: 70.2 % (ref 38–73)
NRBC BLD-RTO: 0 /100 WBC
PHOSPHATE SERPL-MCNC: 4.4 MG/DL (ref 2.7–4.5)
PLATELET # BLD AUTO: 260 K/UL (ref 150–450)
PMV BLD AUTO: 9 FL (ref 9.2–12.9)
POCT GLUCOSE: 102 MG/DL (ref 70–110)
POCT GLUCOSE: 125 MG/DL (ref 70–110)
POCT GLUCOSE: 138 MG/DL (ref 70–110)
POCT GLUCOSE: 89 MG/DL (ref 70–110)
POTASSIUM SERPL-SCNC: 3.7 MMOL/L (ref 3.5–5.1)
PROT SERPL-MCNC: 5.7 G/DL (ref 6–8.4)
PROTHROMBIN TIME: 11.8 SEC (ref 9–12.5)
RBC # BLD AUTO: 2.74 M/UL (ref 4.6–6.2)
SODIUM SERPL-SCNC: 140 MMOL/L (ref 136–145)
WBC # BLD AUTO: 9.41 K/UL (ref 3.9–12.7)

## 2024-01-09 PROCEDURE — 99497 ADVNCD CARE PLAN 30 MIN: CPT | Mod: ,,, | Performed by: STUDENT IN AN ORGANIZED HEALTH CARE EDUCATION/TRAINING PROGRAM

## 2024-01-09 PROCEDURE — 85610 PROTHROMBIN TIME: CPT | Performed by: HOSPITALIST

## 2024-01-09 PROCEDURE — 85730 THROMBOPLASTIN TIME PARTIAL: CPT | Mod: 91 | Performed by: STUDENT IN AN ORGANIZED HEALTH CARE EDUCATION/TRAINING PROGRAM

## 2024-01-09 PROCEDURE — 85730 THROMBOPLASTIN TIME PARTIAL: CPT | Mod: 91 | Performed by: HOSPITALIST

## 2024-01-09 PROCEDURE — 63600175 PHARM REV CODE 636 W HCPCS: Performed by: HOSPITALIST

## 2024-01-09 PROCEDURE — 20600001 HC STEP DOWN PRIVATE ROOM

## 2024-01-09 PROCEDURE — 25000003 PHARM REV CODE 250: Performed by: STUDENT IN AN ORGANIZED HEALTH CARE EDUCATION/TRAINING PROGRAM

## 2024-01-09 PROCEDURE — 27000207 HC ISOLATION

## 2024-01-09 PROCEDURE — 85025 COMPLETE CBC W/AUTO DIFF WBC: CPT | Performed by: INTERNAL MEDICINE

## 2024-01-09 PROCEDURE — 99233 SBSQ HOSP IP/OBS HIGH 50: CPT | Mod: ,,, | Performed by: INTERNAL MEDICINE

## 2024-01-09 PROCEDURE — 25000003 PHARM REV CODE 250

## 2024-01-09 PROCEDURE — 99233 SBSQ HOSP IP/OBS HIGH 50: CPT | Mod: ,,, | Performed by: STUDENT IN AN ORGANIZED HEALTH CARE EDUCATION/TRAINING PROGRAM

## 2024-01-09 PROCEDURE — 25000003 PHARM REV CODE 250: Performed by: HOSPITALIST

## 2024-01-09 PROCEDURE — 84100 ASSAY OF PHOSPHORUS: CPT | Performed by: INTERNAL MEDICINE

## 2024-01-09 PROCEDURE — 80053 COMPREHEN METABOLIC PANEL: CPT | Performed by: INTERNAL MEDICINE

## 2024-01-09 PROCEDURE — 83735 ASSAY OF MAGNESIUM: CPT | Performed by: INTERNAL MEDICINE

## 2024-01-09 PROCEDURE — 25000003 PHARM REV CODE 250: Performed by: INTERNAL MEDICINE

## 2024-01-09 RX ORDER — HEPARIN SODIUM,PORCINE/D5W 25000/250
0-40 INTRAVENOUS SOLUTION INTRAVENOUS CONTINUOUS
Status: DISCONTINUED | OUTPATIENT
Start: 2024-01-09 | End: 2024-01-12

## 2024-01-09 RX ADMIN — FOLIC ACID 1 MG: 1 TABLET ORAL at 09:01

## 2024-01-09 RX ADMIN — NIFEDIPINE 30 MG: 30 TABLET, FILM COATED, EXTENDED RELEASE ORAL at 09:01

## 2024-01-09 RX ADMIN — ACETAMINOPHEN 1000 MG: 500 TABLET ORAL at 11:01

## 2024-01-09 RX ADMIN — HEPARIN SODIUM AND DEXTROSE 12 UNITS/KG/HR: 10000; 5 INJECTION INTRAVENOUS at 01:01

## 2024-01-09 RX ADMIN — LIDOCAINE 5% 1 PATCH: 700 PATCH TOPICAL at 08:01

## 2024-01-09 RX ADMIN — ALLOPURINOL 50 MG: 300 TABLET ORAL at 09:01

## 2024-01-09 RX ADMIN — FAMOTIDINE 20 MG: 20 TABLET, FILM COATED ORAL at 09:01

## 2024-01-09 RX ADMIN — ATORVASTATIN CALCIUM 80 MG: 40 TABLET, FILM COATED ORAL at 09:01

## 2024-01-09 NOTE — PT/OT/SLP PROGRESS
"Physical Therapy      Patient Name:  Chato Bowie   MRN:  [id number]    Patient not seen today secondary to at 1320 attempt pt shouting "leave me alone" to PT/OT when encouraging mobility. OT asked if he would like therapy to re-visit with him this hospital admission and pt did not state preference with plan  . Will follow-up pending participation.    "

## 2024-01-09 NOTE — PLAN OF CARE
AAOX4,VSS,O2 sats>96% on RA.Plan of care discussed with patient.Patient has no complaints of chest pain/SOB/palpitations.Pt rolling in bed, fall precautions in place,no falls/injuries through the shift.Discussed medications and care.Infusing heparin gtts 12 units/kgs/hr as per EMAR.Patient has no questions at this time.Pt resting comfortably with no acute distress.Call light within reach,bed at lowest position.    Problem: Infection  Goal: Absence of Infection Signs and Symptoms  Outcome: Ongoing, Progressing     Problem: Adult Inpatient Plan of Care  Goal: Plan of Care Review  Outcome: Ongoing, Progressing  Goal: Patient-Specific Goal (Individualized)  Outcome: Ongoing, Progressing  Goal: Absence of Hospital-Acquired Illness or Injury  Outcome: Ongoing, Progressing  Goal: Optimal Comfort and Wellbeing  Outcome: Ongoing, Progressing  Goal: Readiness for Transition of Care  Outcome: Ongoing, Progressing     Problem: Device-Related Complication Risk (Hemodialysis)  Goal: Safe, Effective Therapy Delivery  Outcome: Ongoing, Progressing     Problem: Hemodynamic Instability (Hemodialysis)  Goal: Effective Tissue Perfusion  Outcome: Ongoing, Progressing     Problem: Infection (Hemodialysis)  Goal: Absence of Infection Signs and Symptoms  Outcome: Ongoing, Progressing     Problem: Diabetes Comorbidity  Goal: Blood Glucose Level Within Targeted Range  Outcome: Ongoing, Progressing     Problem: Fluid and Electrolyte Imbalance (Acute Kidney Injury/Impairment)  Goal: Fluid and Electrolyte Balance  Outcome: Ongoing, Progressing     Problem: Oral Intake Inadequate (Acute Kidney Injury/Impairment)  Goal: Optimal Nutrition Intake  Outcome: Ongoing, Progressing     Problem: Renal Function Impairment (Acute Kidney Injury/Impairment)  Goal: Effective Renal Function  Outcome: Ongoing, Progressing     Problem: Impaired Wound Healing  Goal: Optimal Wound Healing  Outcome: Ongoing, Progressing     Problem: Skin Injury Risk  Increased  Goal: Skin Health and Integrity  Outcome: Ongoing, Progressing     Problem: Coping Ineffective  Goal: Effective Coping  Outcome: Ongoing, Progressing

## 2024-01-09 NOTE — SUBJECTIVE & OBJECTIVE
Interval History: Scr 5.2 from 5.5. BP 130s-140s/60s. Oxygenating well on RA. Tentative plans for TDC on 1/11. Palliative continues to follow. Last HD 1/3/24. Scr 5.2 from 5.5 yesterday.     Review of patient's allergies indicates:  No Known Allergies  Current Facility-Administered Medications   Medication Frequency    0.9%  NaCl infusion (for blood administration) Q24H PRN    0.9%  NaCl infusion (for blood administration) Q24H PRN    0.9%  NaCl infusion (for blood administration) Q24H PRN    0.9%  NaCl infusion Continuous    0.9%  NaCl infusion PRN    acetaminophen tablet 1,000 mg Q8H    allopurinol split tablet 50 mg Daily    atorvastatin tablet 80 mg Daily    benzonatate capsule 100 mg TID PRN    dextrose 10% bolus 125 mL 125 mL PRN    dextrose 10% bolus 250 mL 250 mL PRN    famotidine tablet 20 mg Daily    folic acid tablet 1 mg Daily    glucagon (human recombinant) injection 1 mg PRN    glucose chewable tablet 16 g PRN    glucose chewable tablet 24 g PRN    heparin (porcine) 2,000 Units in sodium chloride 0.9% 1,000 mL PRN    heparin (porcine) injection 1,000 Units PRN    heparin infusion 1,000 units/500 ml in 0.9% NaCl (on sterile field) PRN    insulin aspart U-100 pen 0-5 Units QID (AC + HS) PRN    LIDOcaine 5 % patch 1 patch Q24H    LIDOcaine HCL 20 mg/ml (2%) injection PRN    magnesium oxide tablet 800 mg PRN    naloxone 0.4 mg/mL injection 0.02 mg PRN    NIFEdipine 24 hr tablet 30 mg Daily    senna-docusate 8.6-50 mg per tablet 2 tablet BID PRN    sodium chloride 0.9% bolus 250 mL 250 mL PRN    sodium chloride 0.9% flush 10 mL Q12H PRN    sodium chloride 0.9% flush 10 mL PRN       Objective:     Vital Signs (Most Recent):  Temp: 97.4 °F (36.3 °C) (01/09/24 1136)  Pulse: 70 (01/09/24 1136)  Resp: 18 (01/09/24 1136)  BP: (!) 144/64 (01/09/24 1136)  SpO2: 99 % (01/09/24 1136) Vital Signs (24h Range):  Temp:  [97.1 °F (36.2 °C)-98.9 °F (37.2 °C)] 97.4 °F (36.3 °C)  Pulse:  [59-73] 70  Resp:  [17-18] 18  SpO2:   [97 %-100 %] 99 %  BP: (116-144)/(56-64) 144/64     Weight: 80.7 kg (178 lb) (12/29/23 0703)  Body mass index is 24.83 kg/m².  Body surface area is 2.01 meters squared.    I/O last 3 completed shifts:  In: -   Out: 675 [Urine:675]     Physical Exam  Vitals and nursing note reviewed.   Constitutional:       General: He is not in acute distress.     Appearance: He is ill-appearing. He is not toxic-appearing.      Interventions: Nasal cannula in place.   HENT:      Head: Normocephalic and atraumatic.      Nose: Nose normal.      Mouth/Throat:      Mouth: Mucous membranes are dry.      Pharynx: No oropharyngeal exudate.   Eyes:      General: No scleral icterus.        Right eye: No discharge.         Left eye: No discharge.   Cardiovascular:      Rate and Rhythm: Normal rate.   Pulmonary:      Effort: Pulmonary effort is normal. No respiratory distress.      Breath sounds: No wheezing or rales.   Abdominal:      General: Abdomen is flat.   Musculoskeletal:      Cervical back: Normal range of motion. No rigidity.      Right lower leg: No edema.      Left lower leg: Edema present.   Lymphadenopathy:      Cervical: No cervical adenopathy.   Neurological:      Mental Status: He is alert and easily aroused.   Psychiatric:         Behavior: Behavior is cooperative.

## 2024-01-09 NOTE — PROGRESS NOTES
[facility] - [facility] Step[facility]  Nephrology  Progress Note    Patient Name: Chato Bowie  MRN: [id number]  Admission Date: 12/28/2023  Hospital Length of Stay: 12 days  Attending Provider: Marilee Rivas MD   Primary Care Physician: Irlanda Valenzuela -  Principal Problem:Acute renal failure superimposed on stage 3b chronic kidney disease    Subjective:     Interval History: Scr 5.2 from 5.5. BP 130s-140s/60s. Oxygenating well on RA. Tentative plans for TDC on 1/11. Palliative continues to follow. Last HD 1/3/24. Scr 5.2 from 5.5 yesterday.     Review of patient's allergies indicates:  No Known Allergies  Current Facility-Administered Medications   Medication Frequency    0.9%  NaCl infusion (for blood administration) Q24H PRN    0.9%  NaCl infusion (for blood administration) Q24H PRN    0.9%  NaCl infusion (for blood administration) Q24H PRN    0.9%  NaCl infusion Continuous    0.9%  NaCl infusion PRN    acetaminophen tablet 1,000 mg Q8H    allopurinol split tablet 50 mg Daily    atorvastatin tablet 80 mg Daily    benzonatate capsule 100 mg TID PRN    dextrose 10% bolus 125 mL 125 mL PRN    dextrose 10% bolus 250 mL 250 mL PRN    famotidine tablet 20 mg Daily    folic acid tablet 1 mg Daily    glucagon (human recombinant) injection 1 mg PRN    glucose chewable tablet 16 g PRN    glucose chewable tablet 24 g PRN    heparin (porcine) 2,000 Units in sodium chloride 0.9% 1,000 mL PRN    heparin (porcine) injection 1,000 Units PRN    heparin infusion 1,000 units/500 ml in 0.9% NaCl (on sterile field) PRN    insulin aspart U-100 pen 0-5 Units QID (AC + HS) PRN    LIDOcaine 5 % patch 1 patch Q24H    LIDOcaine HCL 20 mg/ml (2%) injection PRN    magnesium oxide tablet 800 mg PRN    naloxone 0.4 mg/mL injection 0.02 mg PRN    NIFEdipine 24 hr tablet 30 mg Daily    senna-docusate 8.6-50 mg per tablet 2 tablet BID PRN    sodium chloride 0.9% bolus 250 mL 250 mL PRN    sodium chloride 0.9% flush 10 mL Q12H PRN    sodium chloride  0.9% flush 10 mL PRN       Objective:     Vital Signs (Most Recent):  Temp: 97.4 °F (36.3 °C) (01/09/24 1136)  Pulse: 70 (01/09/24 1136)  Resp: 18 (01/09/24 1136)  BP: (!) 144/64 (01/09/24 1136)  SpO2: 99 % (01/09/24 1136) Vital Signs (24h Range):  Temp:  [97.1 °F (36.2 °C)-98.9 °F (37.2 °C)] 97.4 °F (36.3 °C)  Pulse:  [59-73] 70  Resp:  [17-18] 18  SpO2:  [97 %-100 %] 99 %  BP: (116-144)/(56-64) 144/64     Weight: 80.7 kg (178 lb) (12/29/23 0703)  Body mass index is 24.83 kg/m².  Body surface area is 2.01 meters squared.    I/O last 3 completed shifts:  In: -   Out: 675 [Urine:675]     Physical Exam  Vitals and nursing note reviewed.   Constitutional:       General: He is not in acute distress.     Appearance: He is ill-appearing. He is not toxic-appearing.      Interventions: Nasal cannula in place.   HENT:      Head: Normocephalic and atraumatic.      Nose: Nose normal.      Mouth/Throat:      Mouth: Mucous membranes are dry.      Pharynx: No oropharyngeal exudate.   Eyes:      General: No scleral icterus.        Right eye: No discharge.         Left eye: No discharge.   Cardiovascular:      Rate and Rhythm: Normal rate.   Pulmonary:      Effort: Pulmonary effort is normal. No respiratory distress.      Breath sounds: No wheezing or rales.   Abdominal:      General: Abdomen is flat.   Musculoskeletal:      Cervical back: Normal range of motion. No rigidity.      Right lower leg: No edema.      Left lower leg: Edema present.   Lymphadenopathy:      Cervical: No cervical adenopathy.   Neurological:      Mental Status: He is alert and easily aroused.   Psychiatric:         Behavior: Behavior is cooperative.                  Assessment/Plan:     Cardiac/Vascular  Complete heart block  - defer to primary team     Renal/  * Acute renal failure superimposed on stage 3b chronic kidney disease  Baseline creatinine 2-2.5 (longstanding DM with bilateral osteomyelitis, HTN)  Multiple comorbitites as well     On admission  serum creatinine 7.5  ELIZABETH appears to be multifactorial, possible prolonged pre-renal state/ATN from hemodynamic changes from CHB vs progressive CKD.   Obstruction relieved by jackson placed 12/28/23 with 500 cc of purulent UOP. Supposedly follows with urology in outpatient setting. Is on tamsulosin.   Pyelonephritis with UA showing 3+ leuks with many bacteria. History of proteus in past.   Low effective circulating volume suggested by physical exam, evidence of acute liver injury in setting of HFpEF with severe aortic stenosis. Home medication included metolazone 1 mg bid     Dialysis consent obtained 12/28/23  Renal US with no hydronephrosis   UCPR: 2.05  Azeem 22  Microalbumin / creatinine ratio: 647.3     Plan/Recommendation  -Patient will likely need RRT in the near future given recent ELIZABETH and CKD baseline prior to arrival. We feel it's best to either initiate HD while inpatient prior to discharge   - Tentative plans for TDC placement on 1/11/24  -Consult SW/CM for HD chair placement        -Continue jackson and monitor UOP       -No emergent need for clearance and/or volume removal today.   -Strict ins and outs  -Avoid nephrotoxic agents if possible and renally dose medications  -Avoid drastic hemodynamic changes if possible        Thank you for your consult. I will follow-up with patient. Please contact us if you have any additional questions.    Tanya Dill DNP  Nephrology  [facility] - [facility]

## 2024-01-09 NOTE — ASSESSMENT & PLAN NOTE
73m with CKD3 presented in complete heart block with ELIZABETH, underwent TVP--> micra as well as had trialysis line placed for iHD. Renal function remains poor but not acutely worsening (BL cr 2-3, now ~5.5). Continues to have -1000cc/day.    Nephrology recommending tunneled dialysis catheter placement and vascular access planning. Given patient's ongoing clinical decline and frailty with 10+ hospital admissions over the past year, now bedbound status (past several months), and poor quality of life, considerable concern should be given that he may not be a good outpatient HD candidate.     -plan for family meeting tomorrow 1/11 at 1pm to discuss next steps  -appreciate nephrology expertise in commenting on pros/cons of initiating HD in this frail patient in terms of both prognosis and impact on quality of life

## 2024-01-09 NOTE — PLAN OF CARE
Problem: Infection  Goal: Absence of Infection Signs and Symptoms  Outcome: Ongoing, Progressing     Problem: Adult Inpatient Plan of Care  Goal: Plan of Care Review  Outcome: Ongoing, Progressing  Goal: Absence of Hospital-Acquired Illness or Injury  Outcome: Ongoing, Progressing  Goal: Optimal Comfort and Wellbeing  Outcome: Ongoing, Progressing     Problem: Fluid and Electrolyte Imbalance (Acute Kidney Injury/Impairment)  Goal: Fluid and Electrolyte Balance  Outcome: Ongoing, Progressing     Problem: Renal Function Impairment (Acute Kidney Injury/Impairment)  Goal: Effective Renal Function  Outcome: Ongoing, Progressing     Problem: Impaired Wound Healing  Goal: Optimal Wound Healing  Outcome: Ongoing, Progressing

## 2024-01-09 NOTE — ASSESSMENT & PLAN NOTE
- Admitted with ELIZABETH on CKD  - Initial  with sCr 7.5; improving with dialysis  - Nephrology consulted, started on HD for clearance  - Still making some urine  - Nephro consulted for TDC, but holding on placement as we are planning further GOC discussion with palliative care to determine home w/hospice vs. not

## 2024-01-09 NOTE — ASSESSMENT & PLAN NOTE
73m with HTN, DM2, ELIZABETH on CKD who presented to [facility] from nursing home in [facility], s/p TVP placement      -s/p micra placement

## 2024-01-09 NOTE — NURSING
"Pt refused wound care for bilateral heels and foot.Pt aggressive and stated "go home".This RN educated importance of dressing change to prevent any further infection.Pt demonstrated no understanding.  "

## 2024-01-09 NOTE — ASSESSMENT & PLAN NOTE
Baseline creatinine 2-2.5 (longstanding DM with bilateral osteomyelitis, HTN)  Multiple comorbitites as well     On admission serum creatinine 7.5  ELIZABETH appears to be multifactorial, possible prolonged pre-renal state/ATN from hemodynamic changes from CHB vs progressive CKD.   Obstruction relieved by jackson placed 12/28/23 with 500 cc of purulent UOP. Supposedly follows with urology in outpatient setting. Is on tamsulosin.   Pyelonephritis with UA showing 3+ leuks with many bacteria. History of proteus in past.   Low effective circulating volume suggested by physical exam, evidence of acute liver injury in setting of HFpEF with severe aortic stenosis. Home medication included metolazone 1 mg bid     Dialysis consent obtained 12/28/23  Renal US with no hydronephrosis   UCPR: 2.05  Azeem 22  Microalbumin / creatinine ratio: 647.3     Plan/Recommendation  -Patient will likely need RRT in the near future given recent ELIZABEHT and CKD baseline prior to arrival. We feel it's best to either initiate HD while inpatient prior to discharge   - Tentative plans for TDC placement on 1/11/24  -Consult SW/LEI for HD chair placement        -Continue jackson and monitor UOP       -No emergent need for clearance and/or volume removal today.   -Strict ins and outs  -Avoid nephrotoxic agents if possible and renally dose medications  -Avoid drastic hemodynamic changes if possible

## 2024-01-09 NOTE — PT/OT/SLP PROGRESS
Occupational Therapy      Patient Name:  Chato Bowie   MRN:  [id number]    Patient not seen today secondary to Patient unwilling to participate (Pt refused OT/PT this afternoon, shouting 'leave me alone' despite gentle encouragement/education on guided mobiltiy. Pt unable to voice reason for refusal and unclear on goals of therapy.). Will follow-up as appropriate.    Alyson Robledo, OT  1/9/2024

## 2024-01-09 NOTE — SUBJECTIVE & OBJECTIVE
Interval History: Chart reviewed including 24h medication use. Patient resting in bed, eating lunch during visit. Awake and interactive, but somewhat confused at times. No acute complaints. Renal function remains stable      Medications:  Continuous Infusions:   sodium chloride 0.9% Stopped (01/05/24 1455)    heparin (porcine) in D5W 12 Units/kg/hr (01/09/24 1324)     Scheduled Meds:   acetaminophen  1,000 mg Oral Q8H    allopurinoL  50 mg Oral Daily    atorvastatin  80 mg Oral Daily    famotidine  20 mg Oral Daily    folic acid  1 mg Oral Daily    LIDOcaine  1 patch Transdermal Q24H    NIFEdipine  30 mg Oral Daily     PRN Meds:0.9%  NaCl infusion (for blood administration), 0.9%  NaCl infusion (for blood administration), 0.9%  NaCl infusion (for blood administration), sodium chloride 0.9%, benzonatate, dextrose 10%, dextrose 10%, glucagon (human recombinant), glucose, glucose, heparin (porcine) 2,000 Units in sodium chloride 0.9% 1,000 mL, heparin (porcine), heparin (PORCINE), heparin (PORCINE), heparin (porcine), insulin aspart U-100, LIDOcaine HCL 20 mg/ml (2%), magnesium oxide, naloxone, senna-docusate 8.6-50 mg, sodium chloride 0.9%, sodium chloride 0.9%, sodium chloride 0.9%    Objective:     Vital Signs (Most Recent):  Temp: 97.4 °F (36.3 °C) (01/09/24 1136)  Pulse: 77 (01/09/24 1230)  Resp: 18 (01/09/24 1136)  BP: (!) 144/64 (01/09/24 1136)  SpO2: 99 % (01/09/24 1136) Vital Signs (24h Range):  Temp:  [97.1 °F (36.2 °C)-98.4 °F (36.9 °C)] 97.4 °F (36.3 °C)  Pulse:  [59-77] 77  Resp:  [17-18] 18  SpO2:  [97 %-100 %] 99 %  BP: (126-144)/(59-64) 144/64     Weight: 80.7 kg (178 lb)  Body mass index is 24.83 kg/m².       Physical Exam  Vitals and nursing note reviewed.   Constitutional:       General: He is not in acute distress.     Appearance: He is ill-appearing. He is not toxic-appearing.   HENT:      Mouth/Throat:      Mouth: Mucous membranes are moist.   Eyes:      General: No scleral icterus.      "Extraocular Movements: Extraocular movements intact.   Neck:      Comments:  trialysis right IJ  Pulmonary:      Effort: Pulmonary effort is normal. No respiratory distress.   Abdominal:      General: Abdomen is flat.      Palpations: Abdomen is soft.   Skin:     General: Skin is warm and dry.   Neurological:      Mental Status: He is alert.      Comments: Incoherent at times but answers some complex questions, oriented to location and situation                Advance Care Planning   Advance Directives:   Medical Power of : Yes    Goals of Care: Continued to engage patient in goals of care conversation regarding consideration of dialysis initiation. Reviewed what life with HD at nursing home might look like and expressed my worry that this change might make his quality of life worse but might extend his life. He shared "it might go fine, but it might go bad". I confirmed this. Currently he seems interested in continuing to talk about the pros and cons. He also asks "could I stop once I start if I'm feeling bad" to which I responded of course. I proposed a plan in which we trial HD over the coming weeks and re-evaluate his quality of life and clinical status. At that time, if things are acceptable we could continue and if not, would discuss what stopping HD might look like. He seemed in line with this plan but clearly unable to fully process the complexity of the decisions. I reiterated that I feel we should involve Leigh in this conversation which he agrees with.          CBC:   Recent Labs   Lab 01/09/24  0430   WBC 9.41   HGB 7.3*   HCT 23.0*   MCV 84        BMP:  Recent Labs   Lab 01/09/24  0430   GLU 81      K 3.7      CO2 24   BUN 63*   CREATININE 5.2*   CALCIUM 8.3*   MG 2.2     LFT:  Lab Results   Component Value Date    AST 22 01/09/2024    ALKPHOS 85 01/09/2024    BILITOT 0.5 01/09/2024     Albumin:   Albumin   Date Value Ref Range Status   01/09/2024 1.8 (L) 3.5 - 5.2 g/dL Final "     Protein:   Total Protein   Date Value Ref Range Status   01/09/2024 5.7 (L) 6.0 - 8.4 g/dL Final     Lactic acid:   Lab Results   Component Value Date    LACTATE 0.8 12/30/2023    LACTATE 0.8 11/27/2023     Reviewed CBC with stable blood counts, CMP with stable but poor renal function

## 2024-01-09 NOTE — ASSESSMENT & PLAN NOTE
- H:H stable, has required about 4u of pRBC during hospitalization  - multifactorial- reported h/o melena- would need EGD/colon, but has not been stable- will need outpatient f/u if patient does not decide to enroll in hospice;also with acute renal failure- also contributing to anemia

## 2024-01-09 NOTE — ASSESSMENT & PLAN NOTE
Insight/goals of care- fair, understands limited nature of his life expectancy. Initially discussed implications of not having permanent pacemaker placed, which would likely result in very limited life expectancy which did not seem acceptable to patient or his niece. Subsequently with conversations regarding other potential limits we might place on patient's care as he continues to decline including HD.    Social support- Gulshan is MPOA, patient in nursing home prior to admit, unclear what level of support at home     Spiritual- did not assess    Symptom management- pain related to chronic LE wounds, pain well-controlled with current meds      Recommendations  -DNR, full support  -Leigh Gaffney is MPOA, no secondary decision-makers (sister is living but with dementia)  -ongoing goals of care discussions regarding HD in this patient with poor functional status and progressive clinical decline over the past year  -*continued difficulty engaging MPOA in discussion despite multiple attempts; patient with limited complex understanding but able to express values  -may benefit from trial of HD with tunneled catheter with palliative care follow-up to determine impact on quality of life  -also reasonable to defer to outpatient given stable renal function over the past few days  -continue oxycodone 10mg q4hr PRN for pain, hydromorphone 0.5mg IV q6hr PRN for breakthrough/wound care

## 2024-01-09 NOTE — PROGRESS NOTES
[facility] - Cardiology Stepdown  Palliative Medicine  Progress Note    Patient Name: Chato Bowie  MRN: [id number]  Admission Date: 12/28/2023  Hospital Length of Stay: 12 days  Code Status: DNR   Attending Provider: Marilee Rivas MD  Consulting Provider: Jose Duval MD  Primary Care Physician: Irlanda Valenzuela -  Principal Problem:Acute renal failure superimposed on stage 3b chronic kidney disease    Patient information was obtained from patient, past medical records, and primary team.      Assessment/Plan:     Cardiac/Vascular  Complete heart block  73m with HTN, DM2, ELIZABETH on CKD who presented to [facility] from nursing home in [facility], s/p TVP placement    -s/p micra placement    Acute Renal Failure  73m with CKD3 presented in complete heart block with ELIZABETH, underwent TVP--> micra as well as had trialysis line placed for iHD. Renal function remains poor but not acutely worsening (BL cr 2-3, now ~5.5). Continues to have -700cc/day.    Nephrology recommending tunneled dialysis catheter placement and vascular access planning. Given patient's ongoing clinical decline and frailty with 10+ hospital admissions over the past year, now bedbound status (past several months), and poor quality of life, considerable concern should be given that he may not be a good outpatient HD candidate.     -will continue to explore this with patient's MPOA and attempt to set up family meeting  -may benefit from trial of HD with tunneled catheter with palliative care follow-up to determine impact on quality of life  -could also consider deferring to outpatient given stability  -appreciate nephrology expertise in commenting on pros/cons of initiating HD in this frail patient in terms of both prognosis and impact on quality of life    Palliative Care  Palliative care encounter  Insight/goals of care- fair, understands limited nature of his life expectancy. Initially discussed implications of not having permanent pacemaker placed,  which would likely result in very limited life expectancy which did not seem acceptable to patient or his niece. Subsequently with conversations regarding other potential limits we might place on patient's care as he continues to decline including HD.    Social support- Niece is MPOA, patient in nursing home prior to admit, unclear what level of support at home     Spiritual- did not assess    Symptom management- pain related to chronic LE wounds, pain well-controlled with current meds      Recommendations  -DNR, full support  -Leigh Gaffney is MPOA, no secondary decision-makers (sister is living but with dementia)  -ongoing goals of care discussions regarding HD in this patient with poor functional status and progressive clinical decline over the past year  -*continued difficulty engaging MPOA in discussion despite multiple attempts; patient with limited complex understanding but able to express values    -continue oxycodone 10mg q4hr PRN for pain, hydromorphone 0.5mg IV q6hr PRN for breakthrough/wound care        I will follow-up with patient. Please contact us if you have any additional questions.    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 Underwent HD x3, with improvement in mental status. EP consulted to consider pacemaker placement. Palliative care consulted to assist with goals of care.     Hospital Course:  No notes on file    Interval History: Chart  reviewed including 24h medication use. Patient resting in bed, eating lunch during visit. Awake and interactive, but somewhat confused at times. No acute complaints. Renal function remains stable      Medications:  Continuous Infusions:   sodium chloride 0.9% Stopped (01/05/24 1455)    heparin (porcine) in D5W 12 Units/kg/hr (01/09/24 1324)     Scheduled Meds:   acetaminophen  1,000 mg Oral Q8H    allopurinoL  50 mg Oral Daily    atorvastatin  80 mg Oral Daily    famotidine  20 mg Oral Daily    folic acid  1 mg Oral Daily    LIDOcaine  1 patch Transdermal Q24H    NIFEdipine  30 mg Oral Daily     PRN Meds:0.9%  NaCl infusion (for blood administration), 0.9%  NaCl infusion (for blood administration), 0.9%  NaCl infusion (for blood administration), sodium chloride 0.9%, benzonatate, dextrose 10%, dextrose 10%, glucagon (human recombinant), glucose, glucose, heparin (porcine) 2,000 Units in sodium chloride 0.9% 1,000 mL, heparin (porcine), heparin (PORCINE), heparin (PORCINE), heparin (porcine), insulin aspart U-100, LIDOcaine HCL 20 mg/ml (2%), magnesium oxide, naloxone, senna-docusate 8.6-50 mg, sodium chloride 0.9%, sodium chloride 0.9%, sodium chloride 0.9%    Objective:     Vital Signs (Most Recent):  Temp: 97.4 °F (36.3 °C) (01/09/24 1136)  Pulse: 77 (01/09/24 1230)  Resp: 18 (01/09/24 1136)  BP: (!) 144/64 (01/09/24 1136)  SpO2: 99 % (01/09/24 1136) Vital Signs (24h Range):  Temp:  [97.1 °F (36.2 °C)-98.4 °F (36.9 °C)] 97.4 °F (36.3 °C)  Pulse:  [59-77] 77  Resp:  [17-18] 18  SpO2:  [97 %-100 %] 99 %  BP: (126-144)/(59-64) 144/64     Weight: 80.7 kg (178 lb)  Body mass index is 24.83 kg/m².       Physical Exam  Vitals and nursing note reviewed.   Constitutional:       General: He is not in acute distress.     Appearance: He is ill-appearing. He is not toxic-appearing.   HENT:      Mouth/Throat:      Mouth: Mucous membranes are moist.   Eyes:      General: No scleral icterus.     Extraocular Movements: Extraocular  "movements intact.   Neck:      Comments:  trialysis right IJ  Pulmonary:      Effort: Pulmonary effort is normal. No respiratory distress.   Abdominal:      General: Abdomen is flat.      Palpations: Abdomen is soft.   Skin:     General: Skin is warm and dry.   Neurological:      Mental Status: He is alert.      Comments: Incoherent at times but answers some complex questions, oriented to location and situation                Advance Care Planning  Advance Directives:   Medical Power of : Yes    Goals of Care: Continued to engage patient in goals of care conversation regarding consideration of dialysis initiation. Reviewed what life with HD at nursing home might look like and expressed my worry that this change might make his quality of life worse but might extend his life. He shared "it might go fine, but it might go bad". I confirmed this. Currently he seems interested in continuing to talk about the pros and cons. He also asks "could I stop once I start if I'm feeling bad" to which I responded of course. I proposed a plan in which we trial HD over the coming weeks and re-evaluate his quality of life and clinical status. At that time, if things are acceptable we could continue and if not, would discuss what stopping HD might look like. He seemed in line with this plan but clearly unable to fully process the complexity of the decisions. I reiterated that I feel we should involve Leigh in this conversation which he agrees with.          CBC:   Recent Labs   Lab 01/09/24  0430   WBC 9.41   HGB 7.3*   HCT 23.0*   MCV 84        BMP:  Recent Labs   Lab 01/09/24  0430   GLU 81      K 3.7      CO2 24   BUN 63*   CREATININE 5.2*   CALCIUM 8.3*   MG 2.2     LFT:  Lab Results   Component Value Date    AST 22 01/09/2024    ALKPHOS 85 01/09/2024    BILITOT 0.5 01/09/2024     Albumin:   Albumin   Date Value Ref Range Status   01/09/2024 1.8 (L) 3.5 - 5.2 g/dL Final     Protein:   Total Protein   Date " Value Ref Range Status   01/09/2024 5.7 (L) 6.0 - 8.4 g/dL Final     Lactic acid:   Lab Results   Component Value Date    LACTATE 0.8 12/30/2023    LACTATE 0.8 11/27/2023     Reviewed CBC with stable blood counts, CMP with stable but poor renal function    In my care of this patient with acute on chronic severe illness with threat to life and/or bodily function, I am recommending goal-concordant care as noted above. I spent a significant amount of time reviewing external records/ recommendations of other providers, reviewing recent test results, and discussed care with other subspecialists involved    In addition to above, I spent 18 minutes specifically discussing advance care planning and goals of care with patient at bedside.     The above recommendations communicated directly to primary team on 1/9    Jose Duval MD  Palliative Medicine  [facility] - Cardiology [facility]

## 2024-01-09 NOTE — PROGRESS NOTES
[facility] Hospital Medicine  Progress Note    Primary Team: [facility]  Admit Date: 12/28/2023    Subjective:      Hospital Course:   Admitted for CHB which EP thought to be 2/2 uremia/ELIZABETH and infection. Has not required pacing and is HDS. Recommended holding AV iker agents. BUN and sCr newly elevated, seen by nephrology who started dialysis for clearance. Tolerated dialysis with plans to further dialyze. ID recommended erta x 7 days for ESBL EColi UTI. Continued daptomycin for his BL heel OM.  C/b delirium for which CT head which was unremarkable. Stepped down to  but came back to CCU after he went into torsades on 12/31. CPR was initiated and pt regained pulse and consciousness; bradycardic post event with HR ~50. Electrolytes replaced. TVP placed in CCU; spoke with MPOA who confirmed a DNR status but that she would want a pacemaker placed. EP consulted for PPM insertion. Had another episode of torsades (~ 2 mins) that became CHB and then was paced to NSR. TVP placed. Palliative care consulted for GOC/ACP. Pt more alerted and oriented, states he would like to proceed with the procedure, Micra pacemaker placed on 1/5. Bleeding from groin site with BPs in 80/50s that recovered with 1 L NS bolus. Stepped down to hospital medicine 1/5. Received 2 unit prbc's 1/5-1/6 with stable H/H since. Nephrology feels that patient will need long-term dialysis so Interventional Nephro planning line placement 1/11.  Heparin gtt in lieu of Eliquis prior to procedure. Return to NH.     Interval History:  No acute events overnight. Dialysis line placement planned for 1/11. Heparin gtt in lieu of Eliquis prior to procedure.     ROS:  As per HPI  General: no fever, no chills, no weight loss, no fatigue  Cardiovascular: no chest pain, no orthopnea, no dyspnea  Respiratory: no cough, no wheezes, no SOB  All other systems reviewed & are negative.     Past Medical History:   Diagnosis Date    Acute congestive  heart failure 3/20/2020    Alcohol abuse     Arthritis     Asthma attack 11/24/2021    Coronary artery disease     Diabetes mellitus     Hyperlipemia     Hypertension     Multiple thyroid nodules 6/14/2023    Rhabdomyolysis      Past Surgical History:   Procedure Laterality Date    ECHOCARDIOGRAM,TRANSESOPHAGEAL N/A 9/21/2023    Procedure: Transesophageal echo (MARIA) intra-procedure log documentation;  Surgeon: Luisana Rodríguez MD;  Location: [facility] CATH LAB;  Service: Cardiology;  Laterality: N/A;    EYE SURGERY      IMPLANTATION OF LEADLESS PACEMAKER N/A 1/5/2024    Procedure: INSERTION, CARDIAC PACEMAKER, LEADLESS;  Surgeon: Karl Marin MD;  Location: [facility] EP LAB;  Service: Cardiology;  Laterality: N/A;  CHB, Leadless PPM (Micra), MDT, NAZ, SK, CICU [id number]    IRRIGATION AND DEBRIDEMENT Bilateral 12/1/2023    Procedure: IRRIGATION AND DEBRIDEMENT;  Surgeon: Jenna Gutiérrez DPM;  Location: [facility] OR;  Service: Podiatry;  Laterality: Bilateral;  Request antibiotic beads    TRANSESOPHAGEAL ECHOCARDIOGRAM WITH POSSIBLE CARDIOVERSION (MARIA W/ POSS CARDIOVERSION) N/A 1/5/2023    Procedure: Transesophageal echo (MARIA) intra-procedure log documentation;  Surgeon: Indra Foote MD;  Location: [facility] CATH LAB;  Service: Cardiology;  Laterality: N/A;    TRANSESOPHAGEAL ECHOCARDIOGRAPHY N/A 9/21/2023    Procedure: ECHOCARDIOGRAM, TRANSESOPHAGEAL;  Surgeon: Luisana Rodríguez MD;  Location: [facility] CATH LAB;  Service: Cardiology;  Laterality: N/A;    TREATMENT OF CARDIAC ARRHYTHMIA N/A 12/7/2020    Procedure: Cardioversion or Defibrillation;  Surgeon: Indra Foote MD;  Location: [facility] CATH LAB;  Service: Cardiology;  Laterality: N/A;    TREATMENT OF CARDIAC ARRHYTHMIA N/A 12/30/2020    Procedure: Cardioversion or Defibrillation;  Surgeon: Tyrone Steen MD;  Location: [facility] CATH LAB;  Service: Cardiology;  Laterality: N/A;    TREATMENT OF CARDIAC ARRHYTHMIA N/A 1/5/2023    Procedure: Cardioversion or Defibrillation;   Surgeon: Indra Foote MD;  Location: [facility] CATH LAB;  Service: Cardiology;  Laterality: N/A;    VASCULAR SURGERY           Objective:   Last 24 Hour Vital Signs:  BP  Min: 116/56  Max: 144/64  Temp  Av.9 °F (36.6 °C)  Min: 97.1 °F (36.2 °C)  Max: 98.9 °F (37.2 °C)  Pulse  Av.1  Min: 59  Max: 73  Resp  Av.9  Min: 17  Max: 18  SpO2  Av.3 %  Min: 97 %  Max: 100 %  I/O last 3 completed shifts:  In: -   Out: 675 [Urine:675]    Physical Examination:  GEN: AAOx2 (person, place, not situation), NAD  HEENT: NCAT, MMM, PERRL, EOMI, oropharynx clear, RIJ trialysis catheter  CV: RRR, systolic ejection murmur  RESP: CTAB, no wheezes/crackles, no increased WOB  ABD: soft, NTND, normoactive BS, no organomegaly  EXTR: no c/c, intact distal pulses x 4, groin dressing c/d/I w/o bleeding, mild pitting edema BLE  : [place] in place  NEURO: PERRL, EOMI, moving all four extremities, intact sensation to light touch, no focal deficits  SKIN: no rashes, lesions, or color changes  PSYCH: normal affect    Laboratory:  I have reviewed all pertinent lab results/findings within the past 24 hours.    Radiology:  I have reviewed all pertinent imaging results/findings within the past 24 hours.    Current Medications:     Infusions:   sodium chloride 0.9% Stopped (24 1455)        Scheduled:   acetaminophen  1,000 mg Oral Q8H    allopurinoL  50 mg Oral Daily    atorvastatin  80 mg Oral Daily    famotidine  20 mg Oral Daily    folic acid  1 mg Oral Daily    LIDOcaine  1 patch Transdermal Q24H    NIFEdipine  30 mg Oral Daily        PRN:  0.9%  NaCl infusion (for blood administration), 0.9%  NaCl infusion (for blood administration), 0.9%  NaCl infusion (for blood administration), sodium chloride 0.9%, benzonatate, dextrose 10%, dextrose 10%, glucagon (human recombinant), glucose, glucose, heparin (porcine) 2,000 Units in sodium chloride 0.9% 1,000 mL, heparin (porcine), heparin (porcine), insulin aspart U-100, LIDOcaine  HCL 20 mg/ml (2%), magnesium oxide, naloxone, senna-docusate 8.6-50 mg, sodium chloride 0.9%, sodium chloride 0.9%, sodium chloride 0.9%    Prior records reviewed.       Assessment/Plan:     Chato Bowie is a 73 y.o.male with    Acute renal failure superimposed on stage 3b chronic kidney disease  Admitted with ELIZABETH on CKD  Initial  with sCr 7.5; improving with dialysis  Nephrology consulted, started on HD for clearance  Still making some urine  Dialysis per nephro  Nephro recommends TDC placement and HD chair. Interventional Nephro planning on Thurs    Anemia of chronic disease  -s/p 1 unit earlier this admission  -Required another unit 1/5 in setting of groin site bleeding that has now resolved. 1 more unit 1/6.   -No overt bleeding  -Hb stable now    Complete heart block  Patient with complete heart block, previous EKG with first degree AV block and LBBB  Patient on metoprolol and amiodarone as an outpatient  HR in the 30s on admission  EP consulted, thought it could be 2/2 uremia/ELIZABETH/infection; held off on TVP initially  Stepped down to HM with plans to see how he improved with dialysis  However went into torsades on 12/31  TVP placed by IC; s/p Micra insertion with EP on 1/5  Hold AV iker agents     Torsades de pointes  Went into torsades overnight 12/31; HR went to 160-170, then lost consciousness and pulse. CPR initiated, Pt regained consciousness and pulse then was transferred back to CCU  Received Mg and K although levels were not critical  Medications reviewed - no obvious meds that could be prolonging QTc  TVP placed by IC  Spoke with SID who confirmed DNR code status but she is happy for a PPM to be inserted  Had another episode of torsades on 1/1 for about 2 mins which then converted to CHB which he was paced out of into a HR ~ 65; spoke with EP who advised to overpace him  Remain on telemetry  Keep K > 4, Mg > 2  Will need to check with EP what other medications they may want given the TdP      Osteomyelitis of foot  Admitted on 11/27 from NH for b/l wounds. 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 Wound swab of R ulcer with MRSA.   s/p bone biopsy and debridement by podiatry   On 6 weeks of IV abx Dapto with end date end of 1/10/2024     Advanced care planning/counseling discussion  Cardiology team spoke with SID over the phone who confirmed DNR status however advised that she would like a PPM inserted     Acute encephalopathy  Likely uremic encephalopathy in the setting of ARF with  and asterixis on exam. Infections (UTI and OM) could also be contributing  Remains disoriented but alertness improved, answers some questions appropriately. Oriented to place and month.   Currently able to protect airway  CT head unremarkable  SLP advised for minced/moist diet  Delirium precautions     UTI (urinary tract infection)  Suspect this patient has complicated cystitis given retention and positive UA  UCx growing multi drug resistant ESBL E coli  ID following, started on ertapenem, finished 7 day course     Paroxysmal atrial flutter  On eliquis at home. Hold for TDC placement. Heparin gtt for now.     Severe aortic valve stenosis  TTE:  Left Ventricle: The left ventricle is normal in size. Ventricular mass is normal. Normal wall thickness. There is concentric remodeling. Septal motion is consistent with bundle branch block. There is normal systolic function with a visually estimated ejection fraction of 55 - 60%. Grade II diastolic dysfunction.    Right Ventricle: Normal right ventricular cavity size. Systolic function is normal.    Left Atrium: Left atrium is severely dilated.    Aortic Valve: There is moderate aortic valve sclerosis. Moderately restricted motion. There is severe stenosis. Aortic valve area by VTI is 0.86 cm². Aortic valve peak velocity is 4.12 m/s. Mean gradient is 43 mmHg. The dimensionless index is 0.22. There is mild to moderate aortic regurgitation.    Mitral Valve: There is  bileaflet sclerosis. There is mild annular dilation present.    Tricuspid Valve: There is mild to moderate regurgitation.    Pulmonary Artery: The estimated pulmonary artery systolic pressure is 72 mmHg.    IVC/SVC: Intermediate venous pressure at 8 mmHg.       GERD (gastroesophageal reflux disease)  Continue famotidine     Type 2 diabetes mellitus with kidney complication, with long-term current use of insulin  Last HbA1c 5.5  POCT TIDWM and prior to bed  LDSSI  Monitor BGLs, start long acting insulin if needed     Dyslipidemia  Continue statin     Epigastric abdominal pain  CTAP showing cholelithiasis, no evidence of acute cholecystitis  RESOLVED      Marilee Rivas MD  [facility] Medicine Staff  [facility]

## 2024-01-10 LAB
ALBUMIN SERPL BCP-MCNC: 1.8 G/DL (ref 3.5–5.2)
ALP SERPL-CCNC: 84 U/L (ref 55–135)
ALT SERPL W/O P-5'-P-CCNC: <5 U/L (ref 10–44)
ANION GAP SERPL CALC-SCNC: 10 MMOL/L (ref 8–16)
APTT PPP: 37.9 SEC (ref 21–32)
APTT PPP: 54.4 SEC (ref 21–32)
APTT PPP: 70.5 SEC (ref 21–32)
AST SERPL-CCNC: 19 U/L (ref 10–40)
BASOPHILS # BLD AUTO: 0.08 K/UL (ref 0–0.2)
BASOPHILS NFR BLD: 0.8 % (ref 0–1.9)
BILIRUB SERPL-MCNC: 0.5 MG/DL (ref 0.1–1)
BUN SERPL-MCNC: 62 MG/DL (ref 8–23)
CALCIUM SERPL-MCNC: 8.2 MG/DL (ref 8.7–10.5)
CHLORIDE SERPL-SCNC: 107 MMOL/L (ref 95–110)
CO2 SERPL-SCNC: 22 MMOL/L (ref 23–29)
CREAT SERPL-MCNC: 5.2 MG/DL (ref 0.5–1.4)
DIFFERENTIAL METHOD BLD: ABNORMAL
EOSINOPHIL # BLD AUTO: 0.3 K/UL (ref 0–0.5)
EOSINOPHIL NFR BLD: 2.9 % (ref 0–8)
ERYTHROCYTE [DISTWIDTH] IN BLOOD BY AUTOMATED COUNT: 18.6 % (ref 11.5–14.5)
EST. GFR  (NO RACE VARIABLE): 11 ML/MIN/1.73 M^2
GLUCOSE SERPL-MCNC: 86 MG/DL (ref 70–110)
HCT VFR BLD AUTO: 22.4 % (ref 40–54)
HGB BLD-MCNC: 7.2 G/DL (ref 14–18)
IMM GRANULOCYTES # BLD AUTO: 0.04 K/UL (ref 0–0.04)
IMM GRANULOCYTES NFR BLD AUTO: 0.4 % (ref 0–0.5)
LYMPHOCYTES # BLD AUTO: 1.3 K/UL (ref 1–4.8)
LYMPHOCYTES NFR BLD: 12.9 % (ref 18–48)
MAGNESIUM SERPL-MCNC: 2.1 MG/DL (ref 1.6–2.6)
MCH RBC QN AUTO: 26.7 PG (ref 27–31)
MCHC RBC AUTO-ENTMCNC: 32.1 G/DL (ref 32–36)
MCV RBC AUTO: 83 FL (ref 82–98)
MONOCYTES # BLD AUTO: 1.2 K/UL (ref 0.3–1)
MONOCYTES NFR BLD: 12.3 % (ref 4–15)
NEUTROPHILS # BLD AUTO: 7.1 K/UL (ref 1.8–7.7)
NEUTROPHILS NFR BLD: 70.7 % (ref 38–73)
NRBC BLD-RTO: 0 /100 WBC
PHOSPHATE SERPL-MCNC: 4.7 MG/DL (ref 2.7–4.5)
PLATELET # BLD AUTO: 267 K/UL (ref 150–450)
PMV BLD AUTO: 8.8 FL (ref 9.2–12.9)
POCT GLUCOSE: 107 MG/DL (ref 70–110)
POCT GLUCOSE: 83 MG/DL (ref 70–110)
POCT GLUCOSE: 97 MG/DL (ref 70–110)
POTASSIUM SERPL-SCNC: 3.5 MMOL/L (ref 3.5–5.1)
PROT SERPL-MCNC: 5.8 G/DL (ref 6–8.4)
RBC # BLD AUTO: 2.7 M/UL (ref 4.6–6.2)
SODIUM SERPL-SCNC: 139 MMOL/L (ref 136–145)
WBC # BLD AUTO: 10.08 K/UL (ref 3.9–12.7)

## 2024-01-10 PROCEDURE — 25000003 PHARM REV CODE 250: Performed by: HOSPITALIST

## 2024-01-10 PROCEDURE — 63600175 PHARM REV CODE 636 W HCPCS: Performed by: HOSPITALIST

## 2024-01-10 PROCEDURE — 25000003 PHARM REV CODE 250: Performed by: INTERNAL MEDICINE

## 2024-01-10 PROCEDURE — 99233 SBSQ HOSP IP/OBS HIGH 50: CPT | Mod: ,,, | Performed by: STUDENT IN AN ORGANIZED HEALTH CARE EDUCATION/TRAINING PROGRAM

## 2024-01-10 PROCEDURE — 80053 COMPREHEN METABOLIC PANEL: CPT | Performed by: INTERNAL MEDICINE

## 2024-01-10 PROCEDURE — 25000003 PHARM REV CODE 250: Performed by: STUDENT IN AN ORGANIZED HEALTH CARE EDUCATION/TRAINING PROGRAM

## 2024-01-10 PROCEDURE — 97112 NEUROMUSCULAR REEDUCATION: CPT

## 2024-01-10 PROCEDURE — 85730 THROMBOPLASTIN TIME PARTIAL: CPT | Performed by: HOSPITALIST

## 2024-01-10 PROCEDURE — 27000207 HC ISOLATION

## 2024-01-10 PROCEDURE — 99233 SBSQ HOSP IP/OBS HIGH 50: CPT | Mod: ,,, | Performed by: INTERNAL MEDICINE

## 2024-01-10 PROCEDURE — 84100 ASSAY OF PHOSPHORUS: CPT | Performed by: INTERNAL MEDICINE

## 2024-01-10 PROCEDURE — 99497 ADVNCD CARE PLAN 30 MIN: CPT | Mod: ,,, | Performed by: STUDENT IN AN ORGANIZED HEALTH CARE EDUCATION/TRAINING PROGRAM

## 2024-01-10 PROCEDURE — 25000003 PHARM REV CODE 250

## 2024-01-10 PROCEDURE — 97530 THERAPEUTIC ACTIVITIES: CPT

## 2024-01-10 PROCEDURE — 20600001 HC STEP DOWN PRIVATE ROOM

## 2024-01-10 PROCEDURE — 85025 COMPLETE CBC W/AUTO DIFF WBC: CPT | Performed by: INTERNAL MEDICINE

## 2024-01-10 PROCEDURE — 83735 ASSAY OF MAGNESIUM: CPT | Performed by: INTERNAL MEDICINE

## 2024-01-10 RX ADMIN — NIFEDIPINE 30 MG: 30 TABLET, FILM COATED, EXTENDED RELEASE ORAL at 09:01

## 2024-01-10 RX ADMIN — HEPARIN SODIUM AND DEXTROSE 11 UNITS/KG/HR: 10000; 5 INJECTION INTRAVENOUS at 05:01

## 2024-01-10 RX ADMIN — FAMOTIDINE 20 MG: 20 TABLET, FILM COATED ORAL at 09:01

## 2024-01-10 RX ADMIN — ALLOPURINOL 50 MG: 300 TABLET ORAL at 09:01

## 2024-01-10 RX ADMIN — ATORVASTATIN CALCIUM 80 MG: 40 TABLET, FILM COATED ORAL at 09:01

## 2024-01-10 RX ADMIN — DAPTOMYCIN 650 MG: 350 INJECTION, POWDER, LYOPHILIZED, FOR SOLUTION INTRAVENOUS at 05:01

## 2024-01-10 RX ADMIN — FOLIC ACID 1 MG: 1 TABLET ORAL at 09:01

## 2024-01-10 RX ADMIN — ACETAMINOPHEN 1000 MG: 500 TABLET ORAL at 03:01

## 2024-01-10 NOTE — SUBJECTIVE & OBJECTIVE
Interval History: Chart reviewed including 24h medication use. Patient undergoing wound care during visit, sleeping on follow-up. Discussed care with pt's niece.      Medications:  Continuous Infusions:   sodium chloride 0.9% Stopped (01/05/24 1455)    heparin (porcine) in D5W 13 Units/kg/hr (01/10/24 1228)     Scheduled Meds:   acetaminophen  1,000 mg Oral Q8H    allopurinoL  50 mg Oral Daily    atorvastatin  80 mg Oral Daily    famotidine  20 mg Oral Daily    folic acid  1 mg Oral Daily    LIDOcaine  1 patch Transdermal Q24H    NIFEdipine  30 mg Oral Daily     PRN Meds:0.9%  NaCl infusion (for blood administration), 0.9%  NaCl infusion (for blood administration), 0.9%  NaCl infusion (for blood administration), sodium chloride 0.9%, benzonatate, dextrose 10%, dextrose 10%, glucagon (human recombinant), glucose, glucose, heparin (porcine) 2,000 Units in sodium chloride 0.9% 1,000 mL, heparin (porcine), heparin (PORCINE), heparin (PORCINE), heparin (porcine), insulin aspart U-100, LIDOcaine HCL 20 mg/ml (2%), magnesium oxide, naloxone, senna-docusate 8.6-50 mg, sodium chloride 0.9%, sodium chloride 0.9%, sodium chloride 0.9%    Objective:     Vital Signs (Most Recent):  Temp: 97.9 °F (36.6 °C) (01/10/24 1515)  Pulse: 64 (01/10/24 1515)  Resp: 18 (01/10/24 1515)  BP: 127/60 (01/10/24 1515)  SpO2: 98 % (01/10/24 1515) Vital Signs (24h Range):  Temp:  [97.7 °F (36.5 °C)-98.2 °F (36.8 °C)] 97.9 °F (36.6 °C)  Pulse:  [63-81] 64  Resp:  [16-19] 18  SpO2:  [97 %-100 %] 98 %  BP: (120-139)/(56-64) 127/60     Weight: 80.7 kg (178 lb)  Body mass index is 24.83 kg/m².       Physical Exam  Vitals and nursing note reviewed.   Constitutional:       General: He is not in acute distress.     Appearance: He is ill-appearing. He is not toxic-appearing.   HENT:      Mouth/Throat:      Mouth: Mucous membranes are moist.   Eyes:      General: No scleral icterus.     Extraocular Movements: Extraocular movements intact.   Neck:       "Comments:  trialysis right IJ  Pulmonary:      Effort: Pulmonary effort is normal. No respiratory distress.   Abdominal:      General: Abdomen is flat.      Palpations: Abdomen is soft.   Skin:     General: Skin is warm and dry.   Neurological:      Mental Status: He is alert.      Comments: Incoherent at times but answers some complex questions, oriented to location and situation                Advance Care Planning   Advance Directives:   Medical Power of : Yes    Goals of Care: Re-engaged patient's niece and Leigh LAU via telephone to discuss his ongoing and future care in order to best align care with previously stated values. Leigh expressed concern noting that "if he is going to just die without dialysis, then we have to do it right?". I explained that patient's kidney disease is severe but not acutely life threatening. I also expressed my worry that while dialysis might modestly extend patient's life, it will definitely detract from his quality of life. Leigh explained that patient has no quality of life and that he "just sits in bed all day doing nothing". I reiterated the importance of considering patient's wishes and values when furthering medicalizing his care, as the time we might buy him does not sounds like valuable time. Leigh agrees that she is unsure if patient does want to initiate long term HD and would accept if he did not want to pursue it but she does not feel comfortable making this decision without patient's input and a conversation with him        CBC:   Recent Labs   Lab 01/10/24  0504   WBC 10.08   HGB 7.2*   HCT 22.4*   MCV 83        BMP:  Recent Labs   Lab 01/10/24  0504   GLU 86      K 3.5      CO2 22*   BUN 62*   CREATININE 5.2*   CALCIUM 8.2*   MG 2.1     LFT:  Lab Results   Component Value Date    AST 19 01/10/2024    ALKPHOS 84 01/10/2024    BILITOT 0.5 01/10/2024     Albumin:   Albumin   Date Value Ref Range Status   01/10/2024 1.8 (L) 3.5 - 5.2 g/dL " Final     Protein:   Total Protein   Date Value Ref Range Status   01/10/2024 5.8 (L) 6.0 - 8.4 g/dL Final     Lactic acid:   Lab Results   Component Value Date    LACTATE 0.8 12/30/2023    LACTATE 0.8 11/27/2023       Reviewed CBC with stable blood counts, CMP with stable but poor renal function

## 2024-01-10 NOTE — SUBJECTIVE & OBJECTIVE
Interval History: CR 5.2. Electrolytes stable. 24 . Plan for TDC tomorrow.     Review of patient's allergies indicates:  No Known Allergies  Current Facility-Administered Medications   Medication Frequency    0.9%  NaCl infusion (for blood administration) Q24H PRN    0.9%  NaCl infusion (for blood administration) Q24H PRN    0.9%  NaCl infusion (for blood administration) Q24H PRN    0.9%  NaCl infusion Continuous    0.9%  NaCl infusion PRN    acetaminophen tablet 1,000 mg Q8H    allopurinol split tablet 50 mg Daily    atorvastatin tablet 80 mg Daily    benzonatate capsule 100 mg TID PRN    dextrose 10% bolus 125 mL 125 mL PRN    dextrose 10% bolus 250 mL 250 mL PRN    famotidine tablet 20 mg Daily    folic acid tablet 1 mg Daily    glucagon (human recombinant) injection 1 mg PRN    glucose chewable tablet 16 g PRN    glucose chewable tablet 24 g PRN    heparin (porcine) 2,000 Units in sodium chloride 0.9% 1,000 mL PRN    heparin (porcine) injection 1,000 Units PRN    heparin 25,000 units in dextrose 5% (100 units/ml) IV bolus from bag - ADDITIONAL PRN BOLUS - 30 units/kg PRN    heparin 25,000 units in dextrose 5% (100 units/ml) IV bolus from bag - ADDITIONAL PRN BOLUS - 60 units/kg PRN    heparin 25,000 units in dextrose 5% 250 mL (100 units/mL) infusion LOW INTENSITY nomogram - [facility] Continuous    heparin infusion 1,000 units/500 ml in 0.9% NaCl (on sterile field) PRN    insulin aspart U-100 pen 0-5 Units QID (AC + HS) PRN    LIDOcaine 5 % patch 1 patch Q24H    LIDOcaine HCL 20 mg/ml (2%) injection PRN    magnesium oxide tablet 800 mg PRN    naloxone 0.4 mg/mL injection 0.02 mg PRN    NIFEdipine 24 hr tablet 30 mg Daily    senna-docusate 8.6-50 mg per tablet 2 tablet BID PRN    sodium chloride 0.9% bolus 250 mL 250 mL PRN    sodium chloride 0.9% flush 10 mL Q12H PRN    sodium chloride 0.9% flush 10 mL PRN       Objective:     Vital Signs (Most Recent):  Temp: 97.9 °F (36.6 °C) (01/10/24 1140)  Pulse: 79  (01/10/24 1140)  Resp: 18 (01/10/24 1140)  BP: 137/63 (01/10/24 1140)  SpO2: 100 % (01/10/24 1140) Vital Signs (24h Range):  Temp:  [97.7 °F (36.5 °C)-98.2 °F (36.8 °C)] 97.9 °F (36.6 °C)  Pulse:  [63-81] 79  Resp:  [16-20] 18  SpO2:  [97 %-100 %] 100 %  BP: (120-139)/(56-64) 137/63     Weight: 80.7 kg (178 lb) (12/29/23 0703)  Body mass index is 24.83 kg/m².  Body surface area is 2.01 meters squared.    I/O last 3 completed shifts:  In: 702 [P.O.:702]  Out: 1240 [Urine:1240]     Physical Exam  Vitals and nursing note reviewed.   Constitutional:       General: He is not in acute distress.     Appearance: He is ill-appearing. He is not toxic-appearing.      Interventions: Nasal cannula in place.   HENT:      Head: Normocephalic.      Nose: Nose normal.      Mouth/Throat:      Mouth: Mucous membranes are dry.      Pharynx: No oropharyngeal exudate.   Cardiovascular:      Rate and Rhythm: Normal rate.   Abdominal:      General: Abdomen is flat.   Musculoskeletal:      Cervical back: Normal range of motion. No rigidity.      Right lower leg: No edema.      Left lower leg: No edema.   Lymphadenopathy:      Cervical: No cervical adenopathy.   Neurological:      Mental Status: He is easily aroused.   Psychiatric:         Behavior: Behavior is cooperative.          Significant Labs:  CBC:   Recent Labs   Lab 01/10/24  0504   WBC 10.08   RBC 2.70*   HGB 7.2*   HCT 22.4*      MCV 83   MCH 26.7*   MCHC 32.1     CMP:   Recent Labs   Lab 01/10/24  0504   GLU 86   CALCIUM 8.2*   ALBUMIN 1.8*   PROT 5.8*      K 3.5   CO2 22*      BUN 62*   CREATININE 5.2*   ALKPHOS 84   ALT <5*   AST 19   BILITOT 0.5     All labs within the past 24 hours have been reviewed.

## 2024-01-10 NOTE — ASSESSMENT & PLAN NOTE
Insight/goals of care- fair, understands limited nature of his life expectancy. Initially discussed implications of not having permanent pacemaker placed, which would likely result in very limited life expectancy which did not seem acceptable to patient or his niece. Subsequently with conversations regarding other potential limits we might place on patient's care as he continues to decline including HD. Patient with limited complex understanding but able to express values    Social support- Niece is SID, patient in nursing home prior to admit, unclear what level of support at home     Spiritual- did not assess    Symptom management- pain related to chronic LE wounds, pain well-controlled with current meds      Recommendations  -DNR, full support  -Leigh Gaffney is MPOA, no secondary decision-makers (sister is living but with dementia)  -ongoing goals of care discussions regarding HD in this patient with poor functional status and progressive clinical decline over the past year  -plan for family meeting 1/11 to further discuss  -may benefit from trial of HD with tunneled catheter with palliative care follow-up to determine impact on quality of life  -also reasonable to defer to outpatient given stable renal function over the past few days  -continue oxycodone 10mg q4hr PRN for pain, hydromorphone 0.5mg IV q6hr PRN for breakthrough/wound care   General

## 2024-01-10 NOTE — PROGRESS NOTES
[facility] Hospital Medicine  Progress Note    Primary Team: [facility]  Admit Date: 12/28/2023    Subjective:      Hospital Course:   Admitted for CHB which EP thought to be 2/2 uremia/ELIZABETH and infection. Has not required pacing and is HDS. Recommended holding AV iker agents. BUN and sCr newly elevated, seen by nephrology who started dialysis for clearance. Tolerated dialysis with plans to further dialyze. ID recommended erta x 7 days for ESBL EColi UTI. Continued daptomycin for his BL heel OM.  C/b delirium for which CT head which was unremarkable. Stepped down to  but came back to CCU after he went into torsades on 12/31. CPR was initiated and pt regained pulse and consciousness; bradycardic post event with HR ~50. Electrolytes replaced. TVP placed in CCU; spoke with MPOA who confirmed a DNR status but that she would want a pacemaker placed. EP consulted for PPM insertion. Had another episode of torsades (~ 2 mins) that became CHB and then was paced to NSR. TVP placed. Palliative care consulted for GOC/ACP. Pt more alerted and oriented, states he would like to proceed with the procedure, Micra pacemaker placed on 1/5. Bleeding from groin site with BPs in 80/50s that recovered with 1 L NS bolus. Stepped down to hospital medicine 1/5. Received 2 unit prbc's 1/5-1/6 with stable H/H since. Nephrology feels that patient will need long-term dialysis however unclear if in agreement with patient's wishes. Palliative Care continuing discussions with HCPOA. Interventional Nephro does have a line placement scheduled 1/11.  Heparin gtt was changed in lieu of Eliquis in case of procedure.    Interval History:  No acute events overnight. Family meeting for final decisionon dialysis did not happen as niece was unable to come; wants to try again tomorrow. Interventional Nephro does have a line placement on the books for tomorrow already. Will continue multidisciplinary discussions.     ROS:  As per  HPI  General: no fever, no chills, no weight loss, no fatigue  Cardiovascular: no chest pain, no orthopnea, no dyspnea  Respiratory: no cough, no wheezes, no SOB  All other systems reviewed & are negative.     Past Medical History:   Diagnosis Date    Acute congestive heart failure 3/20/2020    Alcohol abuse     Arthritis     Asthma attack 11/24/2021    Coronary artery disease     Diabetes mellitus     Hyperlipemia     Hypertension     Multiple thyroid nodules 6/14/2023    Rhabdomyolysis      Past Surgical History:   Procedure Laterality Date    ECHOCARDIOGRAM,TRANSESOPHAGEAL N/A 9/21/2023    Procedure: Transesophageal echo (MARIA) intra-procedure log documentation;  Surgeon: Luisana Rodríguez MD;  Location: [facility] CATH LAB;  Service: Cardiology;  Laterality: N/A;    EYE SURGERY      IMPLANTATION OF LEADLESS PACEMAKER N/A 1/5/2024    Procedure: INSERTION, CARDIAC PACEMAKER, LEADLESS;  Surgeon: Karl Marin MD;  Location: [facility] EP LAB;  Service: Cardiology;  Laterality: N/A;  CHB, [place] PPM (Micra), MDT, ANES, SK, CICU [id number]    IRRIGATION AND DEBRIDEMENT Bilateral 12/1/2023    Procedure: IRRIGATION AND DEBRIDEMENT;  Surgeon: Jenna Gutiérrez DPM;  Location: [facility] OR;  Service: Podiatry;  Laterality: Bilateral;  Request antibiotic beads    TRANSESOPHAGEAL ECHOCARDIOGRAM WITH POSSIBLE CARDIOVERSION (MARIA W/ POSS CARDIOVERSION) N/A 1/5/2023    Procedure: Transesophageal echo (MARIA) intra-procedure log documentation;  Surgeon: Indra Foote MD;  Location: [facility] CATH LAB;  Service: Cardiology;  Laterality: N/A;    TRANSESOPHAGEAL ECHOCARDIOGRAPHY N/A 9/21/2023    Procedure: ECHOCARDIOGRAM, TRANSESOPHAGEAL;  Surgeon: Luisana Rodríguez MD;  Location: [facility] CATH LAB;  Service: Cardiology;  Laterality: N/A;    TREATMENT OF CARDIAC ARRHYTHMIA N/A 12/7/2020    Procedure: Cardioversion or Defibrillation;  Surgeon: Indra Foote MD;  Location: [facility] CATH LAB;  Service: Cardiology;  Laterality: N/A;    TREATMENT OF CARDIAC  ARRHYTHMIA N/A 2020    Procedure: Cardioversion or Defibrillation;  Surgeon: Tyrone Steen MD;  Location: [facility] CATH LAB;  Service: Cardiology;  Laterality: N/A;    TREATMENT OF CARDIAC ARRHYTHMIA N/A 2023    Procedure: Cardioversion or Defibrillation;  Surgeon: Indra Foote MD;  Location: [facility] CATH LAB;  Service: Cardiology;  Laterality: N/A;    VASCULAR SURGERY           Objective:   Last 24 Hour Vital Signs:  BP  Min: 120/59  Max: 139/64  Temp  Av.9 °F (36.6 °C)  Min: 97.7 °F (36.5 °C)  Max: 98.2 °F (36.8 °C)  Pulse  Av.6  Min: 63  Max: 81  Resp  Av.2  Min: 16  Max: 20  SpO2  Av %  Min: 97 %  Max: 100 %  I/O last 3 completed shifts:  In: 702 [P.O.:702]  Out: 1240 [Urine:1240]    Physical Examination:  GEN: AAOx2 (person, place, not situation), NAD  HEENT: NCAT, MMM, PERRL, EOMI, oropharynx clear, RIJ trialysis catheter  CV: RRR, systolic ejection murmur  RESP: CTAB, no wheezes/crackles, no increased WOB  ABD: soft, NTND, normoactive BS, no organomegaly  EXTR: no c/c, intact distal pulses x 4, groin dressing c/d/I w/o bleeding, mild pitting edema BLE  : [place] in place  NEURO: PERRL, EOMI, moving all four extremities, intact sensation to light touch, no focal deficits  SKIN: no rashes, lesions, or color changes  PSYCH: normal affect    Laboratory:  I have reviewed all pertinent lab results/findings within the past 24 hours.    Radiology:  I have reviewed all pertinent imaging results/findings within the past 24 hours.    Current Medications:     Infusions:   sodium chloride 0.9% Stopped (24 1455)    heparin (porcine) in D5W 13 Units/kg/hr (01/10/24 1228)        Scheduled:   acetaminophen  1,000 mg Oral Q8H    allopurinoL  50 mg Oral Daily    atorvastatin  80 mg Oral Daily    famotidine  20 mg Oral Daily    folic acid  1 mg Oral Daily    LIDOcaine  1 patch Transdermal Q24H    NIFEdipine  30 mg Oral Daily        PRN:  0.9%  NaCl infusion (for blood administration), 0.9%   NaCl infusion (for blood administration), 0.9%  NaCl infusion (for blood administration), sodium chloride 0.9%, benzonatate, dextrose 10%, dextrose 10%, glucagon (human recombinant), glucose, glucose, heparin (porcine) 2,000 Units in sodium chloride 0.9% 1,000 mL, heparin (porcine), heparin (PORCINE), heparin (PORCINE), heparin (porcine), insulin aspart U-100, LIDOcaine HCL 20 mg/ml (2%), magnesium oxide, naloxone, senna-docusate 8.6-50 mg, sodium chloride 0.9%, sodium chloride 0.9%, sodium chloride 0.9%    Prior records reviewed.       Assessment/Plan:     Chato Bowie is a 73 y.o.male with    Acute renal failure superimposed on stage 3b chronic kidney disease  Admitted with ELIZABETH on CKD  Initial  with sCr 7.5; improving with dialysis  Nephrology consulted, started on HD for clearance  Still making some urine  Dialysis per nephro  Nephro recommends TDC placement and HD chair. Interventional Nephro had line placement scheduled for Thurs 1/11. Unclear if HD is in agreement with patient's wishes. Palliative actively following and discussing with POA    Anemia of chronic disease  -s/p 1 unit earlier this admission  -Required another unit 1/5 in setting of groin site bleeding that has now resolved. 1 more unit 1/6.   -No overt bleeding  -Hb stable now    Complete heart block  Patient with complete heart block, previous EKG with first degree AV block and LBBB  Patient on metoprolol and amiodarone as an outpatient  HR in the 30s on admission  EP consulted, thought it could be 2/2 uremia/ELIZABETH/infection; held off on TVP initially  Stepped down to HM with plans to see how he improved with dialysis  However went into torsades on 12/31  TVP placed by IC; s/p Micra insertion with EP on 1/5  Hold AV iker agents     Torsades de pointes  Went into torsades overnight 12/31; HR went to 160-170, then lost consciousness and pulse. CPR initiated, Pt regained consciousness and pulse then was transferred back to CCU  Received Mg  and K although levels were not critical  Medications reviewed - no obvious meds that could be prolonging QTc  TVP placed by IC  [facility] but she is happy for a PPM to be inserted  Had another episode of torsades on 1/1 for about 2 mins which then converted to CHB which he was paced out of into a HR ~ 65; spoke with EP who advised to overpace him  Remain on telemetry  Keep K > 4, Mg > 2  Will need to check with EP what other medications they may want given the TdP     Osteomyelitis of foot  Admitted on 11/27 from NH for b/l wounds. 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 Wound swab of R ulcer with MRSA.   s/p bone biopsy and debridement by podiatry   On 6 weeks of IV abx Dapto with end date end of 1/10/2024     Advanced care planning/counseling discussion  Cardiology team spoke with SID over the phone who confirmed DNR status however advised that she would like a PPM inserted     Acute encephalopathy  Likely uremic encephalopathy in the setting of ARF with  and asterixis on exam. Infections (UTI and OM) could also be contributing  Remains disoriented but alertness improved, answers some questions appropriately. Oriented to place and month.   Currently able to protect airway  CT head unremarkable  SLP advised for minced/moist diet  Delirium precautions     UTI (urinary tract infection)  Suspect this patient has complicated cystitis given retention and positive UA  UCx growing multi drug resistant ESBL E coli  ID following, started on ertapenem, finished 7 day course     Paroxysmal atrial flutter  On eliquis at home. Hold for possible TDC placement. Heparin gtt for now.     Severe aortic valve stenosis  TTE:  Left Ventricle: The left ventricle is normal in size. Ventricular mass is normal. Normal wall thickness. There is concentric remodeling. Septal motion is consistent with bundle branch block. There is normal systolic function with a visually estimated ejection  fraction of 55 - 60%. Grade II diastolic dysfunction.    Right Ventricle: Normal right ventricular cavity size. Systolic function is normal.    Left Atrium: Left atrium is severely dilated.    Aortic Valve: There is moderate aortic valve sclerosis. Moderately restricted motion. There is severe stenosis. Aortic valve area by VTI is 0.86 cm². Aortic valve peak velocity is 4.12 m/s. Mean gradient is 43 mmHg. The dimensionless index is 0.22. There is mild to moderate aortic regurgitation.    Mitral Valve: There is bileaflet sclerosis. There is mild annular dilation present.    Tricuspid Valve: There is mild to moderate regurgitation.    Pulmonary Artery: The estimated pulmonary artery systolic pressure is 72 mmHg.    IVC/SVC: Intermediate venous pressure at 8 mmHg.       GERD (gastroesophageal reflux disease)  Continue famotidine     Type 2 diabetes mellitus with kidney complication, with long-term current use of insulin  Last HbA1c 5.5  POCT TIDWM and prior to bed  LDSSI  Monitor BGLs, start long acting insulin if needed     Dyslipidemia  Continue statin     Epigastric abdominal pain  CTAP showing cholelithiasis, no evidence of acute cholecystitis  RESOLVED      Marilee Rivas MD  Hospital Medicine Staff  [facility]

## 2024-01-10 NOTE — PROGRESS NOTES
[facility] - Cardiology Stepdown  Palliative Medicine  Progress Note    Patient Name: Chato Bowie  MRN: [id number]  Admission Date: 12/28/2023  Hospital Length of Stay: 13 days  Code Status: DNR   Attending Provider: Marilee Rivas MD  Consulting Provider: Jose Duval MD  Primary Care Physician: Irlanda Valenzuela -  Principal Problem:Acute renal failure superimposed on stage 3b chronic kidney disease    Patient information was obtained from patient, relative(s), past medical records, and primary team.      Assessment/Plan:     Renal/  * Acute renal failure superimposed on stage 3b chronic kidney disease  73m with CKD3 presented in complete heart block with ELIZABETH, underwent TVP--> micra as well as had trialysis line placed for iHD. Renal function remains poor but not acutely worsening (BL cr 2-3, now ~5.5). Continues to have -1000cc/day.    Nephrology recommending tunneled dialysis catheter placement and vascular access planning. Given patient's ongoing clinical decline and frailty with 10+ hospital admissions over the past year, now bedbound status (past several months), and poor quality of life, considerable concern should be given that he may not be a good outpatient HD candidate.     -plan for family meeting tomorrow 1/11 at 1pm to discuss next steps  -appreciate nephrology expertise in commenting on pros/cons of initiating HD in this frail patient in terms of both prognosis and impact on quality of life    Palliative Care  Palliative care encounter  Insight/goals of care- fair, understands limited nature of his life expectancy. Initially discussed implications of not having permanent pacemaker placed, which would likely result in very limited life expectancy which did not seem acceptable to patient or his niece. Subsequently with conversations regarding other potential limits we might place on patient's care as he continues to decline including HD. Patient with limited complex understanding but able  to express values    Social support- Gulshan is MPOA, patient in nursing home prior to admit, unclear what level of support at home     Spiritual- did not assess    Symptom management- pain related to chronic LE wounds, pain well-controlled with current meds      Recommendations  -DNR, full support  -Leigh Gaffney is MPOA, no secondary decision-makers (sister is living but with dementia)  -ongoing goals of care discussions regarding HD in this patient with poor functional status and progressive clinical decline over the past year  -plan for family meeting 1/11 to further discuss  -may benefit from trial of HD with tunneled catheter with palliative care follow-up to determine impact on quality of life  -also reasonable to defer to outpatient given stable renal function over the past few days  -continue oxycodone 10mg q4hr PRN for pain, hydromorphone 0.5mg IV q6hr PRN for breakthrough/wound care        I will follow-up with patient. Please contact us if you have any additional questions.    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 Underwent HD x3, with improvement in mental status. EP consulted to consider pacemaker placement. Palliative care consulted to assist with goals of care.     Hospital Course:  No notes on file    Interval History: Chart reviewed including 24h medication use. Patient undergoing wound care during visit, sleeping on follow-up. Discussed care  with pt's niece.      Medications:  Continuous Infusions:   sodium chloride 0.9% Stopped (01/05/24 1455)    heparin (porcine) in D5W 13 Units/kg/hr (01/10/24 1228)     Scheduled Meds:   acetaminophen  1,000 mg Oral Q8H    allopurinoL  50 mg Oral Daily    atorvastatin  80 mg Oral Daily    famotidine  20 mg Oral Daily    folic acid  1 mg Oral Daily    LIDOcaine  1 patch Transdermal Q24H    NIFEdipine  30 mg Oral Daily     PRN Meds:0.9%  NaCl infusion (for blood administration), 0.9%  NaCl infusion (for blood administration), 0.9%  NaCl infusion (for blood administration), sodium chloride 0.9%, benzonatate, dextrose 10%, dextrose 10%, glucagon (human recombinant), glucose, glucose, heparin (porcine) 2,000 Units in sodium chloride 0.9% 1,000 mL, heparin (porcine), heparin (PORCINE), heparin (PORCINE), heparin (porcine), insulin aspart U-100, LIDOcaine HCL 20 mg/ml (2%), magnesium oxide, naloxone, senna-docusate 8.6-50 mg, sodium chloride 0.9%, sodium chloride 0.9%, sodium chloride 0.9%    Objective:     Vital Signs (Most Recent):  Temp: 97.9 °F (36.6 °C) (01/10/24 1515)  Pulse: 64 (01/10/24 1515)  Resp: 18 (01/10/24 1515)  BP: 127/60 (01/10/24 1515)  SpO2: 98 % (01/10/24 1515) Vital Signs (24h Range):  Temp:  [97.7 °F (36.5 °C)-98.2 °F (36.8 °C)] 97.9 °F (36.6 °C)  Pulse:  [63-81] 64  Resp:  [16-19] 18  SpO2:  [97 %-100 %] 98 %  BP: (120-139)/(56-64) 127/60     Weight: 80.7 kg (178 lb)  Body mass index is 24.83 kg/m².       Physical Exam  Vitals and nursing note reviewed.   Constitutional:       General: He is not in acute distress.     Appearance: He is ill-appearing. He is not toxic-appearing.   HENT:      Mouth/Throat:      Mouth: Mucous membranes are moist.   Eyes:      General: No scleral icterus.     Extraocular Movements: Extraocular movements intact.   Neck:      Comments:  trialysis right IJ  Pulmonary:      Effort: Pulmonary effort is normal. No respiratory distress.   Abdominal:      General: Abdomen is flat.  "     Palpations: Abdomen is soft.   Skin:     General: Skin is warm and dry.   Neurological:      Mental Status: He is alert.      Comments: Incoherent at times but answers some complex questions, oriented to location and situation                Advance Care Planning  Advance Directives:   Medical Power of : Yes    Goals of Care: Re-engaged patient's niece and SIDLeigh via telephone to discuss his ongoing and future care in order to best align care with previously stated values. Leigh expressed concern noting that "if he is going to just die without dialysis, then we have to do it right?". I explained that patient's kidney disease is severe but not acutely life threatening. I also expressed my worry that while dialysis might modestly extend patient's life, it will definitely detract from his quality of life. Leigh explained that patient has no quality of life and that he "just sits in bed all day doing nothing". I reiterated the importance of considering patient's wishes and values when furthering medicalizing his care, as the time we might buy him does not sounds like valuable time. Leigh agrees that she is unsure if patient does want to initiate long term HD and would accept if he did not want to pursue it but she does not feel comfortable making this decision without patient's input and a conversation with him        CBC:   Recent Labs   Lab 01/10/24  0504   WBC 10.08   HGB 7.2*   HCT 22.4*   MCV 83        BMP:  Recent Labs   Lab 01/10/24  0504   GLU 86      K 3.5      CO2 22*   BUN 62*   CREATININE 5.2*   CALCIUM 8.2*   MG 2.1     LFT:  Lab Results   Component Value Date    AST 19 01/10/2024    ALKPHOS 84 01/10/2024    BILITOT 0.5 01/10/2024     Albumin:   Albumin   Date Value Ref Range Status   01/10/2024 1.8 (L) 3.5 - 5.2 g/dL Final     Protein:   Total Protein   Date Value Ref Range Status   01/10/2024 5.8 (L) 6.0 - 8.4 g/dL Final     Lactic acid:   Lab Results   Component Value " Date    LACTATE 0.8 12/30/2023    LACTATE 0.8 11/27/2023       Reviewed CBC with stable blood counts, CMP with stable but poor renal function    In my care of this patient with acute on chronic severe illness with threat to life and/or bodily function, I am recommending goal-concordant care as noted above. I spent a significant amount of time reviewing external records/ recommendations of other providers, reviewing recent test results, and discussed care with other subspecialists involved    The above recommendations communicated directly to primary team on 1/10    In addition to above, I spent 20 minutes specifically discussing advance care planning and goals of care with patient's family via telephone.       Jose Duval MD  Palliative Medicine  [facility] - Cardiology Stepdown

## 2024-01-10 NOTE — PROGRESS NOTES
[facility] - [facility]iology Stepdown  Nephrology  Progress Note    Patient Name: Chato Bowie  MRN: [id number]  Admission Date: 12/28/2023  Hospital Length of Stay: 13 days  Attending Provider: Marilee Rivas MD   Primary Care Physician: Irlanda Valenzuela -  Principal Problem:Acute renal failure superimposed on stage 3b chronic kidney disease    Subjective:     Interval History: CR 5.2. Electrolytes stable. 24 . Plan for TDC tomorrow.     Review of patient's allergies indicates:  No Known Allergies  Current Facility-Administered Medications   Medication Frequency    0.9%  NaCl infusion (for blood administration) Q24H PRN    0.9%  NaCl infusion (for blood administration) Q24H PRN    0.9%  NaCl infusion (for blood administration) Q24H PRN    0.9%  NaCl infusion Continuous    0.9%  NaCl infusion PRN    acetaminophen tablet 1,000 mg Q8H    allopurinol split tablet 50 mg Daily    atorvastatin tablet 80 mg Daily    benzonatate capsule 100 mg TID PRN    dextrose 10% bolus 125 mL 125 mL PRN    dextrose 10% bolus 250 mL 250 mL PRN    famotidine tablet 20 mg Daily    folic acid tablet 1 mg Daily    glucagon (human recombinant) injection 1 mg PRN    glucose chewable tablet 16 g PRN    glucose chewable tablet 24 g PRN    heparin (porcine) 2,000 Units in sodium chloride 0.9% 1,000 mL PRN    heparin (porcine) injection 1,000 Units PRN    heparin 25,000 units in dextrose 5% (100 units/ml) IV bolus from bag - ADDITIONAL PRN BOLUS - 30 units/kg PRN    heparin 25,000 units in dextrose 5% (100 units/ml) IV bolus from bag - ADDITIONAL PRN BOLUS - 60 units/kg PRN    heparin 25,000 units in dextrose 5% 250 mL (100 units/mL) infusion LOW INTENSITY nomogram - [facility] Continuous    heparin infusion 1,000 units/500 ml in 0.9% NaCl (on sterile field) PRN    insulin aspart U-100 pen 0-5 Units QID (AC + HS) PRN    LIDOcaine 5 % patch 1 patch Q24H    LIDOcaine HCL 20 mg/ml (2%) injection PRN    magnesium oxide tablet 800 mg PRN    naloxone 0.4 mg/mL  injection 0.02 mg PRN    NIFEdipine 24 hr tablet 30 mg Daily    senna-docusate 8.6-50 mg per tablet 2 tablet BID PRN    sodium chloride 0.9% bolus 250 mL 250 mL PRN    sodium chloride 0.9% flush 10 mL Q12H PRN    sodium chloride 0.9% flush 10 mL PRN       Objective:     Vital Signs (Most Recent):  Temp: 97.9 °F (36.6 °C) (01/10/24 1140)  Pulse: 79 (01/10/24 1140)  Resp: 18 (01/10/24 1140)  BP: 137/63 (01/10/24 1140)  SpO2: 100 % (01/10/24 1140) Vital Signs (24h Range):  Temp:  [97.7 °F (36.5 °C)-98.2 °F (36.8 °C)] 97.9 °F (36.6 °C)  Pulse:  [63-81] 79  Resp:  [16-20] 18  SpO2:  [97 %-100 %] 100 %  BP: (120-139)/(56-64) 137/63     Weight: 80.7 kg (178 lb) (12/29/23 0703)  Body mass index is 24.83 kg/m².  Body surface area is 2.01 meters squared.    I/O last 3 completed shifts:  In: 702 [P.O.:702]  Out: 1240 [Urine:1240]     Physical Exam  Vitals and nursing note reviewed.   Constitutional:       General: He is not in acute distress.     Appearance: He is ill-appearing. He is not toxic-appearing.      Interventions: Nasal cannula in place.   HENT:      Head: Normocephalic.      Nose: Nose normal.      Mouth/Throat:      Mouth: Mucous membranes are dry.      Pharynx: No oropharyngeal exudate.   Cardiovascular:      Rate and Rhythm: Normal rate.   Abdominal:      General: Abdomen is flat.   Musculoskeletal:      Cervical back: Normal range of motion. No rigidity.      Right lower leg: No edema.      Left lower leg: No edema.   Lymphadenopathy:      Cervical: No cervical adenopathy.   Neurological:      Mental Status: He is easily aroused.   Psychiatric:         Behavior: Behavior is cooperative.          Significant Labs:  CBC:   Recent Labs   Lab 01/10/24  0504   WBC 10.08   RBC 2.70*   HGB 7.2*   HCT 22.4*      MCV 83   MCH 26.7*   MCHC 32.1     CMP:   Recent Labs   Lab 01/10/24  0504   GLU 86   CALCIUM 8.2*   ALBUMIN 1.8*   PROT 5.8*      K 3.5   CO2 22*      BUN 62*   CREATININE 5.2*   ALKPHOS 84    ALT <5*   AST 19   BILITOT 0.5     All labs within the past 24 hours have been reviewed.       Assessment/Plan:     Renal/  * Acute renal failure superimposed on stage 3b chronic kidney disease  Baseline creatinine 2-2.5 (longstanding DM with bilateral osteomyelitis, HTN)  Multiple comorbitites as well     On admission serum creatinine 7.5  ELIZABETH appears to be multifactorial, possible prolonged pre-renal state/ATN from hemodynamic changes from CHB vs progressive CKD.   Obstruction relieved by jackson placed 12/28/23 with 500 cc of purulent UOP. Supposedly follows with urology in outpatient setting. Is on tamsulosin.   Pyelonephritis with UA showing 3+ leuks with many bacteria. History of proteus in past.   Low effective circulating volume suggested by physical exam, evidence of acute liver injury in setting of HFpEF with severe aortic stenosis. Home medication included metolazone 1 mg bid     Dialysis consent obtained 12/28/23  Renal US with no hydronephrosis   UCPR: 2.05  Azeem 22  Microalbumin / creatinine ratio: 647.3     Plan/Recommendation  -Patient will likely need RRT in the near future given recent ELIZABETH and CKD baseline prior to arrival. We feel it's best to either initiate HD while inpatient prior to discharge   -Plans for TDC placement on 1/11/24  -Consult SW/CM for HD chair placement        -Continue jackson and monitor UOP       -No emergent need for clearance and/or volume removal today.   -Strict ins and outs  -Avoid nephrotoxic agents if possible and renally dose medications  -Avoid drastic hemodynamic changes if possible        Thank you for your consult. I will follow-up with patient. Please contact us if you have any additional questions.    Tanya Dill, LOGAN  Nephrology  [facility] - Cardiology Stepdown

## 2024-01-10 NOTE — ASSESSMENT & PLAN NOTE
- Patient with complete heart block, previous EKG with first degree AV block and LBBB  - Patient on metoprolol and amiodarone as an outpatient  - HR in the 30s on admission  - EP consulted, thought it could be 2/2 uremia/ELIZABETH/infection; held off on TVP initially  - Stepped down to  with plans to see how he improved with dialysis  - However went into torsades on 12/31  - TVP placed by IC; s/p Micra insertion with EP on 1/5  - Holding AV iker agents

## 2024-01-10 NOTE — ASSESSMENT & PLAN NOTE
Baseline creatinine 2-2.5 (longstanding DM with bilateral osteomyelitis, HTN)  Multiple comorbitites as well     On admission serum creatinine 7.5  ELIZABETH appears to be multifactorial, possible prolonged pre-renal state/ATN from hemodynamic changes from CHB vs progressive CKD.   Obstruction relieved by jackson placed 12/28/23 with 500 cc of purulent UOP. Supposedly follows with urology in outpatient setting. Is on tamsulosin.   Pyelonephritis with UA showing 3+ leuks with many bacteria. History of proteus in past.   Low effective circulating volume suggested by physical exam, evidence of acute liver injury in setting of HFpEF with severe aortic stenosis. Home medication included metolazone 1 mg bid     Dialysis consent obtained 12/28/23  Renal US with no hydronephrosis   UCPR: 2.05  Azeem 22  Microalbumin / creatinine ratio: 647.3     Plan/Recommendation  -Patient will likely need RRT in the near future given recent ELIZABETH and CKD baseline prior to arrival. We feel it's best to either initiate HD while inpatient prior to discharge   -Plans for TDC placement on 1/11/24  -Consult SW/LEI for HD chair placement        -Continue jackson and monitor UOP       -No emergent need for clearance and/or volume removal today.   -Strict ins and outs  -Avoid nephrotoxic agents if possible and renally dose medications  -Avoid drastic hemodynamic changes if possible

## 2024-01-10 NOTE — PLAN OF CARE
Problem: Infection  Goal: Absence of Infection Signs and Symptoms  Outcome: Ongoing, Progressing     Problem: Adult Inpatient Plan of Care  Goal: Plan of Care Review  Outcome: Ongoing, Progressing  Goal: Patient-Specific Goal (Individualized)  Outcome: Ongoing, Progressing  Goal: Absence of Hospital-Acquired Illness or Injury  Outcome: Ongoing, Progressing  Goal: Optimal Comfort and Wellbeing  Outcome: Ongoing, Progressing  Goal: Readiness for Transition of Care  Outcome: Ongoing, Progressing     Problem: Device-Related Complication Risk (Hemodialysis)  Goal: Safe, Effective Therapy Delivery  Outcome: Ongoing, Progressing     Problem: Hemodynamic Instability (Hemodialysis)  Goal: Effective Tissue Perfusion  Outcome: Ongoing, Progressing     Problem: Infection (Hemodialysis)  Goal: Absence of Infection Signs and Symptoms  Outcome: Ongoing, Progressing     Problem: Fluid and Electrolyte Imbalance (Acute Kidney Injury/Impairment)  Goal: Fluid and Electrolyte Balance  Outcome: Ongoing, Progressing     Problem: Oral Intake Inadequate (Acute Kidney Injury/Impairment)  Goal: Optimal Nutrition Intake  Outcome: Ongoing, Progressing     Problem: Renal Function Impairment (Acute Kidney Injury/Impairment)  Goal: Effective Renal Function  Outcome: Ongoing, Progressing     Problem: Skin Injury Risk Increased  Goal: Skin Health and Integrity  Outcome: Ongoing, Progressing     Problem: Coping Ineffective  Goal: Effective Coping  Outcome: Ongoing, Progressing

## 2024-01-10 NOTE — PT/OT/SLP PROGRESS
"Occupational Therapy   Co-Treatment  Co-treatment performed due to patient's multiple deficits requiring two skilled therapists to appropriately and safely assess patient's strength and endurance while facilitating functional tasks in addition to accommodating for patient's activity tolerance.      Name: Chato Bowie  MRN: [id number]  Admitting Diagnosis:  Acute renal failure superimposed on stage 3b chronic kidney disease  5 Days Post-Op    Recommendations:     Discharge Recommendations: Low Intensity Therapy  Discharge Equipment Recommendations:  none  Barriers to discharge:  None    Assessment:     Chato Bowie is a 73 y.o. male with a medical diagnosis of Acute renal failure superimposed on stage 3b chronic kidney disease.  He presents with the following performance deficits affecting function are weakness, impaired endurance, impaired self care skills, impaired functional mobility, pain, decreased safety awareness, decreased lower extremity function, decreased upper extremity function, impaired cardiopulmonary response to activity, decreased coordination. PT/OT discussed pt's goals with therapy during this hospital admission and he was agreeable to participate. Pt primarily limited due to B LE pain and required significant assistance along with cues for safety with transfers. Pt would continue to benefit from skilled OT services to maximize functional independence with ADLs and functional mobility, reduce caregiver burden, and facilitate safe discharge in the least restrictive environment.     Rehab Prognosis:  Fair; patient would benefit from acute skilled OT services to address these deficits and reach maximum level of function.       Plan:     Patient to be seen 3 x/week to address the above listed problems via self-care/home management, therapeutic activities, therapeutic exercises, neuromuscular re-education  Plan of Care Expires: 02/07/24  Plan of Care Reviewed with: patient    Subjective   "Put me " "back!"  Chief Complaint: B LE pain with movement  Patient/Family Comments/goals: to walk  Pain/Comfort:  Pain Rating 1: other (see comments) (unrated)  Location - Side 1: Bilateral  Location - Orientation 1: generalized  Location 1: leg  Pain Addressed 1: Reposition, Distraction  Pain Rating Post-Intervention 1: other (see comments) (unrated)    Objective:   Additional staff present:  Shi PT    Communicated with: RN prior to session.  Patient found HOB elevated with PICC line, telemetry, jackson catheter, blood pressure cuff upon OT entry to room.    General Precautions: Standard, fall    Orthopedic Precautions:N/A  Braces: N/A  Respiratory Status: Room air     Occupational Performance:     Bed Mobility:    Patient completed Scooting/Bridging with total assistance and 2 persons  Patient completed Supine to Sit with total assistance and 2 persons  Patient completed Sit to Supine with total assistance and 2 persons     Functional Mobility/Transfers:  Pt sat edge of bed for <10 seconds with total (A)  Pt with significant posterior lean and requesting to return to supine d/t poor tolerance to pain    Activities of Daily Living:  Not performed- pt reported completion of ADLs prior to OT session      [facility] 6 Click ADL: 8    Treatment & Education:  -Education on energy conservation and task modification to maximize safety and (I) during ADLs and mobility  -Education on importance of OOB activity to improve overall activity tolerance and promote recovery  -Pt educated to call for assistance and to transfer with hospital staff only  -Provided education regarding role of OT, POC, & discharge recommendations with pt verbalizing understanding.  Pt had no further questions & when asked whether there were any concerns pt reported none.     Patient left HOB elevated with all lines intact, call button in reach, and RN notified    GOALS:   Multidisciplinary Problems       Occupational Therapy Goals          Problem: Occupational " Therapy    Goal Priority Disciplines Outcome Interventions   Occupational Therapy Goal     OT, PT/OT Ongoing, Progressing    Description: Goals to be met by: 2/7/24 (1 month)     Patient will increase functional independence with ADLs by performing:    Feeding with Supervision.  Grooming while seated with Stand-by Assistance.  Toileting from bedside commode with Moderate Assistance for hygiene and clothing management.   Sitting at edge of bed x10 minutes with Stand-by Assistance.  Rolling to Bilateral with Minimal Assistance.   Squat pivot transfers with Moderate Assistance.  Toilet transfer to bedside commode with Moderate Assistance.                         Time Tracking:     OT Date of Treatment: 01/10/24  OT Start Time: 0923  OT Stop Time: 0946  OT Total Time (min): 23 min    Billable Minutes:Neuromuscular Re-education 23    OT/JASON: OT          1/10/2024

## 2024-01-11 LAB
ALBUMIN SERPL BCP-MCNC: 1.8 G/DL (ref 3.5–5.2)
ALP SERPL-CCNC: 95 U/L (ref 55–135)
ALT SERPL W/O P-5'-P-CCNC: <5 U/L (ref 10–44)
ANION GAP SERPL CALC-SCNC: 10 MMOL/L (ref 8–16)
APTT PPP: 41.6 SEC (ref 21–32)
APTT PPP: 44.7 SEC (ref 21–32)
APTT PPP: 71.7 SEC (ref 21–32)
AST SERPL-CCNC: 21 U/L (ref 10–40)
BASOPHILS # BLD AUTO: 0.11 K/UL (ref 0–0.2)
BASOPHILS NFR BLD: 1.1 % (ref 0–1.9)
BILIRUB SERPL-MCNC: 0.6 MG/DL (ref 0.1–1)
BUN SERPL-MCNC: 62 MG/DL (ref 8–23)
CALCIUM SERPL-MCNC: 8.5 MG/DL (ref 8.7–10.5)
CHLORIDE SERPL-SCNC: 107 MMOL/L (ref 95–110)
CO2 SERPL-SCNC: 24 MMOL/L (ref 23–29)
CREAT SERPL-MCNC: 5 MG/DL (ref 0.5–1.4)
DIFFERENTIAL METHOD BLD: ABNORMAL
EOSINOPHIL # BLD AUTO: 0.2 K/UL (ref 0–0.5)
EOSINOPHIL NFR BLD: 2.3 % (ref 0–8)
ERYTHROCYTE [DISTWIDTH] IN BLOOD BY AUTOMATED COUNT: 18.4 % (ref 11.5–14.5)
EST. GFR  (NO RACE VARIABLE): 11.5 ML/MIN/1.73 M^2
GLUCOSE SERPL-MCNC: 69 MG/DL (ref 70–110)
HCT VFR BLD AUTO: 24.2 % (ref 40–54)
HGB BLD-MCNC: 7.4 G/DL (ref 14–18)
IMM GRANULOCYTES # BLD AUTO: 0.04 K/UL (ref 0–0.04)
IMM GRANULOCYTES NFR BLD AUTO: 0.4 % (ref 0–0.5)
LYMPHOCYTES # BLD AUTO: 1.1 K/UL (ref 1–4.8)
LYMPHOCYTES NFR BLD: 11.2 % (ref 18–48)
MAGNESIUM SERPL-MCNC: 2.2 MG/DL (ref 1.6–2.6)
MCH RBC QN AUTO: 26 PG (ref 27–31)
MCHC RBC AUTO-ENTMCNC: 30.6 G/DL (ref 32–36)
MCV RBC AUTO: 85 FL (ref 82–98)
MONOCYTES # BLD AUTO: 1.1 K/UL (ref 0.3–1)
MONOCYTES NFR BLD: 11.1 % (ref 4–15)
NEUTROPHILS # BLD AUTO: 7.1 K/UL (ref 1.8–7.7)
NEUTROPHILS NFR BLD: 73.9 % (ref 38–73)
NRBC BLD-RTO: 0 /100 WBC
PHOSPHATE SERPL-MCNC: 4.7 MG/DL (ref 2.7–4.5)
PLATELET # BLD AUTO: 304 K/UL (ref 150–450)
PMV BLD AUTO: 9.1 FL (ref 9.2–12.9)
POCT GLUCOSE: 103 MG/DL (ref 70–110)
POCT GLUCOSE: 76 MG/DL (ref 70–110)
POCT GLUCOSE: 82 MG/DL (ref 70–110)
POCT GLUCOSE: 87 MG/DL (ref 70–110)
POTASSIUM SERPL-SCNC: 3.3 MMOL/L (ref 3.5–5.1)
PROT SERPL-MCNC: 6.1 G/DL (ref 6–8.4)
RBC # BLD AUTO: 2.85 M/UL (ref 4.6–6.2)
SODIUM SERPL-SCNC: 141 MMOL/L (ref 136–145)
WBC # BLD AUTO: 9.61 K/UL (ref 3.9–12.7)

## 2024-01-11 PROCEDURE — 20600001 HC STEP DOWN PRIVATE ROOM

## 2024-01-11 PROCEDURE — 25000003 PHARM REV CODE 250: Performed by: INTERNAL MEDICINE

## 2024-01-11 PROCEDURE — 80053 COMPREHEN METABOLIC PANEL: CPT | Performed by: INTERNAL MEDICINE

## 2024-01-11 PROCEDURE — 84100 ASSAY OF PHOSPHORUS: CPT | Performed by: INTERNAL MEDICINE

## 2024-01-11 PROCEDURE — 85730 THROMBOPLASTIN TIME PARTIAL: CPT | Mod: 91 | Performed by: HOSPITALIST

## 2024-01-11 PROCEDURE — 63600175 PHARM REV CODE 636 W HCPCS: Performed by: HOSPITALIST

## 2024-01-11 PROCEDURE — 25000003 PHARM REV CODE 250: Performed by: HOSPITALIST

## 2024-01-11 PROCEDURE — 85730 THROMBOPLASTIN TIME PARTIAL: CPT | Mod: 91 | Performed by: STUDENT IN AN ORGANIZED HEALTH CARE EDUCATION/TRAINING PROGRAM

## 2024-01-11 PROCEDURE — 27000207 HC ISOLATION

## 2024-01-11 PROCEDURE — 85025 COMPLETE CBC W/AUTO DIFF WBC: CPT | Performed by: INTERNAL MEDICINE

## 2024-01-11 PROCEDURE — 25000003 PHARM REV CODE 250: Performed by: STUDENT IN AN ORGANIZED HEALTH CARE EDUCATION/TRAINING PROGRAM

## 2024-01-11 PROCEDURE — 99497 ADVNCD CARE PLAN 30 MIN: CPT | Mod: ,,, | Performed by: STUDENT IN AN ORGANIZED HEALTH CARE EDUCATION/TRAINING PROGRAM

## 2024-01-11 PROCEDURE — 85730 THROMBOPLASTIN TIME PARTIAL: CPT | Performed by: HOSPITALIST

## 2024-01-11 PROCEDURE — 99233 SBSQ HOSP IP/OBS HIGH 50: CPT | Mod: ,,, | Performed by: INTERNAL MEDICINE

## 2024-01-11 PROCEDURE — 83735 ASSAY OF MAGNESIUM: CPT | Performed by: INTERNAL MEDICINE

## 2024-01-11 PROCEDURE — 25000003 PHARM REV CODE 250

## 2024-01-11 PROCEDURE — 99233 SBSQ HOSP IP/OBS HIGH 50: CPT | Mod: ,,, | Performed by: STUDENT IN AN ORGANIZED HEALTH CARE EDUCATION/TRAINING PROGRAM

## 2024-01-11 RX ORDER — POTASSIUM CHLORIDE 750 MG/1
30 CAPSULE, EXTENDED RELEASE ORAL
Status: COMPLETED | OUTPATIENT
Start: 2024-01-11 | End: 2024-01-11

## 2024-01-11 RX ADMIN — ACETAMINOPHEN 1000 MG: 500 TABLET ORAL at 01:01

## 2024-01-11 RX ADMIN — POTASSIUM CHLORIDE 30 MEQ: 10 CAPSULE, COATED, EXTENDED RELEASE ORAL at 09:01

## 2024-01-11 RX ADMIN — NIFEDIPINE 30 MG: 30 TABLET, FILM COATED, EXTENDED RELEASE ORAL at 09:01

## 2024-01-11 RX ADMIN — HEPARIN SODIUM AND DEXTROSE 9 UNITS/KG/HR: 10000; 5 INJECTION INTRAVENOUS at 09:01

## 2024-01-11 RX ADMIN — ATORVASTATIN CALCIUM 80 MG: 40 TABLET, FILM COATED ORAL at 09:01

## 2024-01-11 RX ADMIN — POTASSIUM CHLORIDE 30 MEQ: 10 CAPSULE, COATED, EXTENDED RELEASE ORAL at 11:01

## 2024-01-11 RX ADMIN — FAMOTIDINE 20 MG: 20 TABLET, FILM COATED ORAL at 09:01

## 2024-01-11 RX ADMIN — FOLIC ACID 1 MG: 1 TABLET ORAL at 09:01

## 2024-01-11 RX ADMIN — ALLOPURINOL 50 MG: 300 TABLET ORAL at 09:01

## 2024-01-11 RX ADMIN — LIDOCAINE 5% 1 PATCH: 700 PATCH TOPICAL at 06:01

## 2024-01-11 NOTE — ASSESSMENT & PLAN NOTE
Nutrition consulted. Most recent weight and BMI pending    Measurements:  Wt Readings from Last 1 Encounters:   12/29/23 80.7 kg (178 lb)   Body mass index is 24.83 kg/m².    Patient will been screened and assessed by RD.    Malnutrition Type:  Context: chronic illness  Level: severe    Malnutrition Characteristic Summary:  Weight Loss (Malnutrition): other (see comments) (15% x 3 months)  Energy Intake (Malnutrition): less than 75% for greater than or equal to 3 months  Subcutaneous Fat (Malnutrition): mild depletion  Muscle Mass (Malnutrition): moderate depletion    Interventions/Recommendations (treatment strategy):  1)

## 2024-01-11 NOTE — PLAN OF CARE
Problem: Adult Inpatient Plan of Care  Goal: Plan of Care Review  Outcome: Ongoing, Progressing  Goal: Patient-Specific Goal (Individualized)  Outcome: Ongoing, Progressing  Goal: Absence of Hospital-Acquired Illness or Injury  Outcome: Ongoing, Progressing     Problem: Device-Related Complication Risk (Hemodialysis)  Goal: Safe, Effective Therapy Delivery  Outcome: Ongoing, Progressing     Problem: Diabetes Comorbidity  Goal: Blood Glucose Level Within Targeted Range  Outcome: Ongoing, Progressing     Problem: Oral Intake Inadequate (Acute Kidney Injury/Impairment)  Goal: Optimal Nutrition Intake  Outcome: Ongoing, Progressing     Problem: Impaired Wound Healing  Goal: Optimal Wound Healing  Outcome: Ongoing, Progressing

## 2024-01-11 NOTE — ASSESSMENT & PLAN NOTE
Patient has hypokalemia which is Acute and currently controlled. Most recent potassium levels reviewed-   Lab Results   Component Value Date    K 3.3 (L) 01/11/2024   . Will continue potassium replacement per protocol and recheck repeat levels after replacement completed.

## 2024-01-11 NOTE — ASSESSMENT & PLAN NOTE
- tte/ 12/29  Summary         Left Ventricle: The left ventricle is normal in size. Ventricular mass is normal. Normal wall thickness. There is concentric remodeling. Septal motion is consistent with bundle branch block. There is normal systolic function with a visually estimated ejection fraction of 55 - 60%. Grade II diastolic dysfunction.    Right Ventricle: Normal right ventricular cavity size. Systolic function is normal.    Left Atrium: Left atrium is severely dilated.    Aortic Valve: There is moderate aortic valve sclerosis. Moderately restricted motion. There is severe stenosis. Aortic valve area by VTI is 0.86 cm². Aortic valve peak velocity is 4.12 m/s. Mean gradient is 43 mmHg. The dimensionless index is 0.22. There is mild to moderate aortic regurgitation.    Mitral Valve: There is bileaflet sclerosis. There is mild annular dilation present.    Tricuspid Valve: There is mild to moderate regurgitation.    Pulmonary Artery: The estimated pulmonary artery systolic pressure is 72 mmHg.    IVC/SVC: Intermediate venous pressure at 8 mmHg.    - outpatient f/u with interventional cardiology

## 2024-01-11 NOTE — PROGRESS NOTES
[facility] - Cardiology [facility] Medicine  Progress Note    Patient Name: Chato Bowie  MRN: [id number]  Patient Class: IP- Inpatient   Admission Date: 12/28/2023  Length of Stay: 14 days  Attending Physician: Gabbi Small MD  Primary Care Provider: Irlanda Valenzuela -        Subjective:     Principal Problem:Acute renal failure superimposed on stage 3b chronic kidney disease        HPI:  73yoM w/PMHx of T2DM, HTN, HLD, CHF, CAD, HLD, CKD 3b, alcohol use disorder, and multiple thyroid nodules presented from [facility] with RLQ pain, fatigue, bradycardia ~30's, weakness, and lab abnormalities including Cr 7.1 and . 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  On interview patient lethargic and disoriented, history obtained via chart review.    In ED pt AF, bradycardic ~40's, RR in the mid 20's, BP ~110/50, satting 100% on 2L NC. Labs significant for Hb 7.2, ANC 8.3, Na 132, Cl 87, HCO3 23, AG 22, , Cr 7.5, phos 8.3, Albumin 1.9, AST 74, , Lipase 146. UA with 2+ protein, 2+ occult blood, 3+ leukocytes, >100WBC and many bacteria. EKG showed wide QRS with LBBB, vent rate 45, QT/QTc 652/563. CXR showed mild retraction of R-sided PICC into mid SVC. CT A&P in progress. In ED jackson placed, started on CTX, given fluids, nephrology consulted. Admitting to [facility] for further management.     Overview/Hospital Course:  Admitted for CHB which EP thought to be 2/2 uremia/ELIZABETH and infection. Has not required pacing and is HDS. Recommended holding AV iker agents. BUN and sCr newly elevated, seen by nephrology who started dialysis for clearance. Tolerated dialysis with plans to further dialyze. ID recommended erta x 7 days for ESBL EColi UTI. Continued daptomycin for his BL heel OM.  C/b delirium for which CT head which was unremarkable. Stepped  down to  but came back to CCU after he went into torsades on 12/31. CPR was initiated and pt regained pulse and consciousness; bradycardic post event with HR ~50. Electrolytes replaced. TVP placed in CCU; spoke with SID who confirmed a DNR status but that she would want a pacemaker placed. EP consulted for PPM insertion. Had another episode of torsades (~ 2 mins) that became CHB and then was paced to NSR. TVP placed. Palliative care consulted for GOC/ACP. Pt more alerted and oriented, states he would like to proceed with the procedure, Micra pacemaker placed on 1/5. Bleeding from groin site with BPs in 80/50s that recovered with 1 L NS bolus. Stepped down to hospital medicine 1/5. Received 2 unit prbc's 1/5-1/6 with stable H/H since. Nephrology feels that patient will need long-term dialysis so Interventional Nephro planning line placement 1/11.  Heparin gtt in lieu of Eliquis prior to procedure.     Palliative care with ongoing GOC discussion for TDC vs. Home w/hospice.     Interval History: No complaints per patient this am- he seems disoriented- not able to state his location, time, current president, or reason for current hospitalization. No chest pain, sob, nausea, vomiting.    Objective:     Vital Signs (Most Recent):  Temp: 97.3 °F (36.3 °C) (01/11/24 1218)  Pulse: 69 (01/11/24 1218)  Resp: 16 (01/11/24 1218)  BP: 125/60 (01/11/24 1218)  SpO2: 98 % (01/11/24 1218) Vital Signs (24h Range):  Temp:  [97.3 °F (36.3 °C)-98.1 °F (36.7 °C)] 97.3 °F (36.3 °C)  Pulse:  [62-70] 69  Resp:  [16-18] 16  SpO2:  [97 %-98 %] 98 %  BP: (119-129)/(57-61) 125/60     Weight: 80.7 kg (178 lb)  Body mass index is 24.83 kg/m².    Intake/Output Summary (Last 24 hours) at 1/11/2024 1356  Last data filed at 1/11/2024 0505  Gross per 24 hour   Intake --   Output 825 ml   Net -825 ml      Physical Exam  Gen: in NAD, appears stated age, appears chronically ill   Neuro: awake, oriented to self alone, not oriented to location, time or  "situation, motor, sensory, and strength grossly intact BL  HEENT: NTNC, EOMI, PERRL, MMM, Rt IJ trialysis, muddy sclera  CVS: RRR, sys ejection murmur, no rubs or gallops; S1/S2 auscultated with no S3 or S4; capillary refill < 2 sec  Resp: lungs CTAB, no w/r/r; no belabored breathing or accessory muscle use appreciated   Abd: BS+ in all 4 quadrants; NTND, soft to palpation; no organomegaly appreciated   Extrem: pulses full, equal, and regular over all 4 extremities; no UE edema, BL LE edema  : jackson in place    Significant Labs: All pertinent labs within the past 24 hours have been reviewed.  CBC:   Recent Labs   Lab 01/10/24  0504 01/11/24  0456   WBC 10.08 9.61   HGB 7.2* 7.4*   HCT 22.4* 24.2*    304     CMP:   Recent Labs   Lab 01/10/24  0504 01/11/24  0455    141   K 3.5 3.3*    107   CO2 22* 24   GLU 86 69*   BUN 62* 62*   CREATININE 5.2* 5.0*   CALCIUM 8.2* 8.5*   PROT 5.8* 6.1   ALBUMIN 1.8* 1.8*   BILITOT 0.5 0.6   ALKPHOS 84 95   AST 19 21   ALT <5* <5*   ANIONGAP 10 10     Cardiac Markers: No results for input(s): "CKMB", "MYOGLOBIN", "BNP", "TROPISTAT" in the last 48 hours.    Significant Imaging: I have reviewed all pertinent imaging results/findings within the past 24 hours.    Assessment/Plan:      * Acute renal failure superimposed on stage 3b chronic kidney disease  - Admitted with ELIZABETH on CKD  - Initial  with sCr 7.5; improving with dialysis  - Nephrology consulted, started on HD for clearance  - Still making some urine  - Nephro consulted for TDC, but holding on placement as we are planning further GOC discussion with palliative care to determine home w/hospice vs. not    Irritant contact dermatitis due to fecal, urinary or dual incontinence  - wound care following     Torsades de pointes  - h/o torsades    Complete heart block  - Patient with complete heart block, previous EKG with first degree AV block and LBBB  - Patient on metoprolol and amiodarone as an " outpatient  - HR in the 30s on admission  - EP consulted, thought it could be 2/2 uremia/ELIZABETH/infection; held off on TVP initially  - Stepped down to  with plans to see how he improved with dialysis  - However went into torsades on 12/31  - TVP placed by IC; s/p Micra insertion with EP on 1/5  - Holding AV iker agents    Osteomyelitis of foot  - admitted 11/27 for BL wounds- OM of BL calcaneus and 5th metatarsal- found to be MRSA +iv  - s/p bone biopsy and debridement via podiatriy  - on 6wks of IV dapto- EOT 01/10/24    Advanced care planning/counseling discussion  - GOC w/family today for TDC vs. Not and potential or hospice    Acute encephalopathy  - improved, likely 2/2 uremic encephalopathy and UTI on admission  - some delirium    Bradycardia  - see [facility]    Severe malnutrition  Nutrition consulted. Most recent weight and BMI pending    Measurements:  Wt Readings from Last 1 Encounters:   12/29/23 80.7 kg (178 lb)   Body mass index is 24.83 kg/m².    Patient will been screened and assessed by RD.    Malnutrition Type:  Context: chronic illness  Level: severe    Malnutrition Characteristic Summary:  Weight Loss (Malnutrition): other (see comments) (15% x 3 months)  Energy Intake (Malnutrition): less than 75% for greater than or equal to 3 months  Subcutaneous Fat (Malnutrition): mild depletion  Muscle Mass (Malnutrition): moderate depletion    Interventions/Recommendations (treatment strategy):  1)      UTI (urinary tract infection)  - s/p 7d of ertapenem for ESBL E coli    Paroxysmal atrial flutter  - home eliquis- resume on discharge  - on heparin gtt at this time while awaiting TDC vs. not    Severe aortic valve stenosis  - tte/ 12/29  Summary         Left Ventricle: The left ventricle is normal in size. Ventricular mass is normal. Normal wall thickness. There is concentric remodeling. Septal motion is consistent with bundle branch block. There is normal systolic function with a visually estimated ejection  fraction of 55 - 60%. Grade II diastolic dysfunction.    Right Ventricle: Normal right ventricular cavity size. Systolic function is normal.    Left Atrium: Left atrium is severely dilated.    Aortic Valve: There is moderate aortic valve sclerosis. Moderately restricted motion. There is severe stenosis. Aortic valve area by VTI is 0.86 cm². Aortic valve peak velocity is 4.12 m/s. Mean gradient is 43 mmHg. The dimensionless index is 0.22. There is mild to moderate aortic regurgitation.    Mitral Valve: There is bileaflet sclerosis. There is mild annular dilation present.    Tricuspid Valve: There is mild to moderate regurgitation.    Pulmonary Artery: The estimated pulmonary artery systolic pressure is 72 mmHg.    IVC/SVC: Intermediate venous pressure at 8 mmHg.    - outpatient f/u with interventional cardiology    Hypokalemia  Patient has hypokalemia which is Acute and currently controlled. Most recent potassium levels reviewed-   Lab Results   Component Value Date    K 3.3 (L) 01/11/2024   . Will continue potassium replacement per protocol and recheck repeat levels after replacement completed.     GERD (gastroesophageal reflux disease)  - continue PPI    Anemia  - H:H stable, has required about 4u of pRBC during hospitalization  - multifactorial- reported h/o melena- would need EGD/colon, but has not been stable- will need outpatient f/u if patient does not decide to enroll in hospice;also with acute renal failure- also contributing to anemia    Type 2 diabetes mellitus with kidney complication, with long-term current use of insulin  Patient's FSGs are controlled on current medication regimen.  Last A1c reviewed-   Lab Results   Component Value Date    HGBA1C 5.5 09/20/2023     Most recent fingerstick glucose reviewed-   Recent Labs   Lab 01/10/24  2037 01/11/24  0909 01/11/24  1218 01/11/24  1338   POCTGLUCOSE 83 76 82 103       Current correctional scale  Low  Maintain anti-hyperglycemic dose as follows-    Antihyperglycemics (From admission, onward)      Start     Stop Route Frequency Ordered    12/28/23 1818  insulin aspart U-100 pen 0-5 Units         -- SubQ Before meals & nightly PRN 12/28/23 1719          Hold Oral hypoglycemics while patient is in the hospital.    Dyslipidemia  - Cont home statin    Epigastric abdominal pain  - resolve    VTE Risk Mitigation (From admission, onward)           Ordered     heparin 25,000 units in dextrose 5% (100 units/ml) IV bolus from bag - ADDITIONAL PRN BOLUS - 60 units/kg  As needed (PRN)        Question:  Heparin Infusion Adjustment (DO NOT MODIFY ANSWER)  Answer:  \\[email or web address]\Images\[email or web address]\[email or web address]\heparin LOW INTENSITY nomogram for [facility] [email or web address]    01/09/24 1144     heparin 25,000 units in dextrose 5% (100 units/ml) IV bolus from bag - ADDITIONAL PRN BOLUS - 30 units/kg  As needed (PRN)        Question:  Heparin Infusion Adjustment (DO NOT MODIFY ANSWER)  Answer:  \[email or web address]\Images\[email or web address]\[email or web address]\heparin LOW INTENSITY nomogram for [facility] [email or web address]    01/09/24 1144     heparin 25,000 units in dextrose 5% 250 mL (100 units/mL) infusion LOW INTENSITY nomogram - [facility]  Continuous        Question:  Begin at (units/kg/hr)  Answer:  12    01/09/24 1144     heparin (porcine) 2,000 Units in sodium chloride 0.9% 1,000 mL  As needed (PRN)         01/05/24 1025     heparin infusion 1,000 units/500 ml in 0.9% NaCl (on sterile field)  As needed (PRN)         01/05/24 1024     heparin (porcine) injection 1,000 Units  As needed (PRN)         12/30/23 1056     IP VTE HIGH RISK PATIENT  Once         12/28/23 1719     Place sequential compression device  Until discontinued         12/28/23 1719                    Discharge Planning   ELY: 1/12/2024     Code Status: DNR   Is the patient medically ready for discharge?: No    Reason for patient still in hospital (select all that apply): Patient trending condition  Discharge Plan A: Return to nursing home                     Gabbi Small MD  Department of [facility] Medicine   [facility] - Cardiology Stepdown

## 2024-01-11 NOTE — ASSESSMENT & PLAN NOTE
See ACP 1/3-1/11    Insight/goals of care- fair, understands limited nature of his life expectancy. Initially discussed implications of not having permanent pacemaker placed, which would likely result in very limited life expectancy which did not seem acceptable to patient or his niece. Subsequently with conversations regarding other potential limits we might place on patient's care as he continues to decline including HD. Patient now clearly understanding pros/cons of continuing to pursue HD    Social support- Niece is MPOA, patient in nursing home prior to admit, unclear what level of support at home     Spiritual- did not assess    Symptom management- pain related to chronic LE wounds, pain well-controlled with current meds      Recommendations  -DNR, full support  -Leigh Gaffney is MPOA, no secondary decision-makers (sister is living but with dementia)  -1/11 long conversation with patient at bedside, he able to demonstrate fair understanding of pros/cons of pursuing outpatient HD  -reasonable to proceed with intermediate-term dialysis access and coordination despite significant barriers given nursing home and bedbound status  -continue oxycodone 10mg q4hr PRN for pain, hydromorphone 0.5mg IV q6hr PRN for breakthrough/wound care

## 2024-01-11 NOTE — SUBJECTIVE & OBJECTIVE
Interval History: Chart reviewed including 24h medication use. Patient somnolent but arousable, conversant and lucid this afternoon, answering some complex questions. No active complaints. Niece did not come to hospital past 2 days despite plans for family meeting      Medications:  Continuous Infusions:   sodium chloride 0.9% Stopped (01/05/24 1455)    heparin (porcine) in D5W 9 Units/kg/hr (01/11/24 0907)     Scheduled Meds:   acetaminophen  1,000 mg Oral Q8H    allopurinoL  50 mg Oral Daily    atorvastatin  80 mg Oral Daily    famotidine  20 mg Oral Daily    folic acid  1 mg Oral Daily    LIDOcaine  1 patch Transdermal Q24H    NIFEdipine  30 mg Oral Daily     PRN Meds:0.9%  NaCl infusion (for blood administration), 0.9%  NaCl infusion (for blood administration), 0.9%  NaCl infusion (for blood administration), sodium chloride 0.9%, benzonatate, dextrose 10%, dextrose 10%, glucagon (human recombinant), glucose, glucose, heparin (porcine) 2,000 Units in sodium chloride 0.9% 1,000 mL, heparin (porcine), heparin (PORCINE), heparin (PORCINE), heparin (porcine), insulin aspart U-100, LIDOcaine HCL 20 mg/ml (2%), magnesium oxide, naloxone, senna-docusate 8.6-50 mg, sodium chloride 0.9%, sodium chloride 0.9%, sodium chloride 0.9%    Objective:     Vital Signs (Most Recent):  Temp: 97.3 °F (36.3 °C) (01/11/24 1218)  Pulse: 69 (01/11/24 1218)  Resp: 16 (01/11/24 1218)  BP: 125/60 (01/11/24 1218)  SpO2: 98 % (01/11/24 1218) Vital Signs (24h Range):  Temp:  [97.3 °F (36.3 °C)-98.1 °F (36.7 °C)] 97.3 °F (36.3 °C)  Pulse:  [62-70] 69  Resp:  [16-18] 16  SpO2:  [97 %-98 %] 98 %  BP: (119-129)/(57-61) 125/60     Weight: 80.7 kg (178 lb)  Body mass index is 24.83 kg/m².       Physical Exam  Vitals and nursing note reviewed.   Constitutional:       General: He is not in acute distress.     Appearance: He is ill-appearing. He is not toxic-appearing.   HENT:      Mouth/Throat:      Mouth: Mucous membranes are moist.   Eyes:       "General: No scleral icterus.     Extraocular Movements: Extraocular movements intact.   Neck:      Comments:  trialysis right IJ  Pulmonary:      Effort: Pulmonary effort is normal. No respiratory distress.   Abdominal:      General: Abdomen is flat.      Palpations: Abdomen is soft.   Skin:     General: Skin is warm and dry.   Neurological:      Mental Status: He is alert.      Comments: Incoherent at times but answers some complex questions, oriented to location and situation                Advance Care Planning   Advance Directives:   Medical Power of : Yes    Goals of Care: Engaged patient in further discussion regarding need for dialysis. He is able to recall prior conversations and asks several questions about life with dialysis. He is able to state that dialysis would be likely 3 sessions per week when they "clean his blood for a few hours". He asked questions about how most people feel on HD, and I answered, noting some patients feel better with HD, although many note that the long sessions leave them feeling more fatigued. I also expressed my worry that further medicalizing his life will lead to more time in the hospital which he previously stated was not desirable. He then asked me "what should I do, doc?". I assured him that I could not answer this for him but that we are looking at a decision for care that might help him to live longer but will likely decrease his quality of life. He asked if it would make sense to start dialysis, see how he feels for a few weeks, and then stop if he is not happy with things. I affirmed that this is a reasonable approach to make if we are able to arrange for outpatient HD. I was unable to discuss the above with Leigh despite multiple attempts to reach her via telephone      CBC:   Recent Labs   Lab 01/11/24  0456   WBC 9.61   HGB 7.4*   HCT 24.2*   MCV 85        BMP:  Recent Labs   Lab 01/11/24  0455   GLU 69*      K 3.3*      CO2 24   BUN 62* "   CREATININE 5.0*   CALCIUM 8.5*   MG 2.2     LFT:  Lab Results   Component Value Date    AST 21 01/11/2024    ALKPHOS 95 01/11/2024    BILITOT 0.6 01/11/2024     Albumin:   Albumin   Date Value Ref Range Status   01/11/2024 1.8 (L) 3.5 - 5.2 g/dL Final     Protein:   Total Protein   Date Value Ref Range Status   01/11/2024 6.1 6.0 - 8.4 g/dL Final     Lactic acid:   Lab Results   Component Value Date    LACTATE 0.8 12/30/2023    LACTATE 0.8 11/27/2023       Reviewed CBC with stable blood counts, CMP with stable but poor renal function

## 2024-01-11 NOTE — ASSESSMENT & PLAN NOTE
73m with CKD3 presented in complete heart block with ELIZABETH, underwent TVP--> micra as well as had trialysis line placed for iHD. Renal function remains poor but not acutely worsening (BL cr 2-3, now ~5.5). Continues to have -1000cc/day.    Nephrology recommending tunneled dialysis catheter placement and vascular access planning. Given patient's ongoing clinical decline and frailty with 10+ hospital admissions over the past year, now bedbound status (past several months), and poor quality of life, considerable concern should be given that he may not be a good outpatient HD candidate.     -after discussion with patient on 1/11, confident he understands the pros and cons of initiating HD, asks meaningful questions about what discontinuing would look like if he is not happy with his quality of life over the coming weeks  -appreciate nephrology expertise in commenting on pros/cons of initiating HD in this frail patient in terms of both prognosis and impact on quality of life

## 2024-01-11 NOTE — PROGRESS NOTES
[facility]  Nephrology  Progress Note    Patient Name: Chato Bowie  MRN: [id number]  Admission Date: 12/28/2023  Hospital Length of Stay: 14 days  Attending Provider: Gabbi Small MD   Primary Care Physician: Irlanda Valenzuela -  Principal Problem:Acute renal failure superimposed on stage 3b chronic kidney disease    Subjective:     Interval History: Scr 5.0 from 5.2. 24 UOP 1.5L. TDC postponed. Plan for family meeting today at 1pm.      Review of patient's allergies indicates:  No Known Allergies  Current Facility-Administered Medications   Medication Frequency    0.9%  NaCl infusion (for blood administration) Q24H PRN    0.9%  NaCl infusion (for blood administration) Q24H PRN    0.9%  NaCl infusion (for blood administration) Q24H PRN    0.9%  NaCl infusion Continuous    0.9%  NaCl infusion PRN    acetaminophen tablet 1,000 mg Q8H    allopurinol split tablet 50 mg Daily    atorvastatin tablet 80 mg Daily    benzonatate capsule 100 mg TID PRN    dextrose 10% bolus 125 mL 125 mL PRN    dextrose 10% bolus 250 mL 250 mL PRN    famotidine tablet 20 mg Daily    folic acid tablet 1 mg Daily    glucagon (human recombinant) injection 1 mg PRN    glucose chewable tablet 16 g PRN    glucose chewable tablet 24 g PRN    heparin (porcine) 2,000 Units in sodium chloride 0.9% 1,000 mL PRN    heparin (porcine) injection 1,000 Units PRN    heparin 25,000 units in dextrose 5% (100 units/ml) IV bolus from bag - ADDITIONAL PRN BOLUS - 30 units/kg PRN    heparin 25,000 units in dextrose 5% (100 units/ml) IV bolus from bag - ADDITIONAL PRN BOLUS - 60 units/kg PRN    heparin 25,000 units in dextrose 5% 250 mL (100 units/mL) infusion LOW INTENSITY nomogram - [facility] Continuous    heparin infusion 1,000 units/500 ml in 0.9% NaCl (on sterile field) PRN    insulin aspart U-100 pen 0-5 Units QID (AC + HS) PRN    LIDOcaine 5 % patch 1 patch Q24H    LIDOcaine HCL 20 mg/ml (2%) injection PRN    magnesium oxide tablet 800 mg  PRN    naloxone 0.4 mg/mL injection 0.02 mg PRN    NIFEdipine 24 hr tablet 30 mg Daily    potassium chloride CR capsule 30 mEq Q1H    senna-docusate 8.6-50 mg per tablet 2 tablet BID PRN    sodium chloride 0.9% bolus 250 mL 250 mL PRN    sodium chloride 0.9% flush 10 mL Q12H PRN    sodium chloride 0.9% flush 10 mL PRN       Objective:     Vital Signs (Most Recent):  Temp: 97.8 °F (36.6 °C) (01/11/24 0756)  Pulse: 69 (01/11/24 0756)  Resp: 17 (01/11/24 0756)  BP: 129/60 (01/11/24 0756)  SpO2: 98 % (01/11/24 0756) Vital Signs (24h Range):  Temp:  [97.5 °F (36.4 °C)-98.1 °F (36.7 °C)] 97.8 °F (36.6 °C)  Pulse:  [62-79] 69  Resp:  [17-18] 17  SpO2:  [97 %-100 %] 98 %  BP: (119-137)/(57-63) 129/60     Weight: 80.7 kg (178 lb) (12/29/23 0703)  Body mass index is 24.83 kg/m².  Body surface area is 2.01 meters squared.    I/O last 3 completed shifts:  In: 440 [P.O.:440]  Out: 1825 [Urine:1825]     Physical Exam  Vitals and nursing note reviewed.   Constitutional:       General: He is not in acute distress.     Appearance: He is ill-appearing. He is not toxic-appearing.      Interventions: Nasal cannula in place.   HENT:      Head: Normocephalic.      Nose: Nose normal.      Mouth/Throat:      Mouth: Mucous membranes are dry.      Pharynx: No oropharyngeal exudate.   Cardiovascular:      Rate and Rhythm: Normal rate.   Abdominal:      General: Abdomen is flat.   Musculoskeletal:      Cervical back: Normal range of motion. No rigidity.      Right lower leg: No edema.      Left lower leg: No edema.   Lymphadenopathy:      Cervical: No cervical adenopathy.   Skin:     General: Skin is warm and dry.   Neurological:      Mental Status: He is easily aroused.   Psychiatric:         Behavior: Behavior is cooperative.          Significant Labs:  CBC:   Recent Labs   Lab 01/11/24  0456   WBC 9.61   RBC 2.85*   HGB 7.4*   HCT 24.2*      MCV 85   MCH 26.0*   MCHC 30.6*     CMP:   Recent Labs   Lab 01/11/24  0455   GLU 69*    CALCIUM 8.5*   ALBUMIN 1.8*   PROT 6.1      K 3.3*   CO2 24      BUN 62*   CREATININE 5.0*   ALKPHOS 95   ALT <5*   AST 21   BILITOT 0.6     All labs within the past 24 hours have been reviewed.     Assessment/Plan:     Cardiac/Vascular  Complete heart block  - defer to primary team     Renal/  * Acute renal failure superimposed on stage 3b chronic kidney disease  Baseline creatinine 2-2.5 (longstanding DM with bilateral osteomyelitis, HTN)  Multiple comorbitites as well     On admission serum creatinine 7.5  ELIZABETH appears to be multifactorial, possible prolonged pre-renal state/ATN from hemodynamic changes from CHB vs progressive CKD.   Obstruction relieved by jackson placed 12/28/23 with 500 cc of purulent UOP. Supposedly follows with urology in outpatient setting. Is on tamsulosin.   Pyelonephritis with UA showing 3+ leuks with many bacteria. History of proteus in past.   Low effective circulating volume suggested by physical exam, evidence of acute liver injury in setting of HFpEF with severe aortic stenosis. Home medication included metolazone 1 mg bid     Dialysis consent obtained 12/28/23  Renal US with no hydronephrosis   UCPR: 2.05  Azeem 22  Microalbumin / creatinine ratio: 647.3     Plan/Recommendation    -Patient will likely need RRT in the near future given recent ELIZABETH and CKD baseline prior to arrival. No acute indication for RRT at this time  -Agree with holding off line placement for now -Pending GOC discussions with POA  -Continue jackson and monitor UOP   -Strict ins and outs  -Avoid nephrotoxic agents if possible and renally dose medications  -Avoid drastic hemodynamic changes if possible        Thank you for your consult. I will follow-up with patient. Please contact us if you have any additional questions.    Tanya Dill, LOGAN  Nephrology  [facility]

## 2024-01-11 NOTE — PLAN OF CARE
[facility]  Discharge Reassessment    Primary Care Provider: Irlanda Valenzuela -    Expected Discharge Date: 1/16/2024    Reassessment (most recent)       Discharge Reassessment - 01/11/24 1515          Discharge Reassessment    Assessment Type Discharge Planning Reassessment     Discharge Plan discussed with: Patient     Communicated ELY with patient/caregiver Date not available/Unable to determine     Discharge Plan A New Nursing Home placement - care home care facility   for stretcher dialysis    Discharge Plan B Hospice/home;Return to Nursing Home     DME Needed Upon Discharge  none     Transition of Care Barriers Mobility;Social     Why the patient remains in the hospital Requires continued medical care        Post-Acute Status    Post-Acute Authorization Placement     Post-Acute Placement Status Referrals Sent                 SW informed by Palliative Care physician Dr Duval that after discussion with pt, pt is interesting in pursuing dialysis.  Per chart review and discussion with CM supervisor Carine and nephrology social worker Kay it was determined that pt is unable to sit on the edge of the bed he is unlikely to be accepted by an outpatient dialysis clinic and therefore his only option for dialysis might be moving to [facility], if they are able to accept him.  SW met with pt at bedside and relayed the above, to which pt voiced agreement with sending referral to [facility].  Pt also admitted to  that he has been unable to sit up.  Referral placed to [facility] via [facility] and SW attempted to call facility but no answer.    UPDATE 3:30 PM  SW called [facility] again and left voicemail for admissions requesting call back.      Veena Lozoya LMSW  [facility]  [id number]

## 2024-01-11 NOTE — CARE UPDATE
Interventional Nephrology:    We are still trying to finalize the best care plan for the patient.    In view of his poor quality of life, DNR and the cardiac arrhythmia, with his stable labs and urine output without dialysis, will wait for the final family decision and meeting today.     We are postponing the cuffed tunneled HD cath. insertion till next week.    Hemodialysis could be restarted with the temporary line if it is required.      Please keep us updated on any care plan update.     JEREMIE RUANO.Tim. MD. MILLIE. YUE.  , [employer] [facility] [employer] [facility] ([place]).  Nephrology Consultant. [facility].   [address]. [address]th [place] [address].    email: [email or web address]  Tel: Office: [phone]

## 2024-01-11 NOTE — ASSESSMENT & PLAN NOTE
- wound care following      Anticoagulation Management    Unable to reach Marko today.    Today's INR result of 2.4 is subtherapeutic (goal INR of 2.5-3.5).  Result received from: [facility]    Follow up required to confirm warfarin dose taken and assess for changes    Left message to call [phone]      Anticoagulation clinic to follow up    Leatha Jewell RN

## 2024-01-11 NOTE — ASSESSMENT & PLAN NOTE
Patient's FSGs are controlled on current medication regimen.  Last A1c reviewed-   Lab Results   Component Value Date    HGBA1C 5.5 09/20/2023     Most recent fingerstick glucose reviewed-   Recent Labs   Lab 01/10/24  2037 01/11/24  0909 01/11/24  1218 01/11/24  1338   POCTGLUCOSE 83 76 82 103       Current correctional scale  Low  Maintain anti-hyperglycemic dose as follows-   Antihyperglycemics (From admission, onward)    Start     Stop Route Frequency Ordered    12/28/23 1818  insulin aspart U-100 pen 0-5 Units         -- SubQ Before meals & nightly PRN 12/28/23 1719        Hold Oral hypoglycemics while patient is in the hospital.

## 2024-01-11 NOTE — SUBJECTIVE & OBJECTIVE
Interval History: No complaints per patient this am- he seems disoriented- not able to state his location, time, current president, or reason for current hospitalization. No chest pain, sob, nausea, vomiting.    Objective:     Vital Signs (Most Recent):  Temp: 97.3 °F (36.3 °C) (01/11/24 1218)  Pulse: 69 (01/11/24 1218)  Resp: 16 (01/11/24 1218)  BP: 125/60 (01/11/24 1218)  SpO2: 98 % (01/11/24 1218) Vital Signs (24h Range):  Temp:  [97.3 °F (36.3 °C)-98.1 °F (36.7 °C)] 97.3 °F (36.3 °C)  Pulse:  [62-70] 69  Resp:  [16-18] 16  SpO2:  [97 %-98 %] 98 %  BP: (119-129)/(57-61) 125/60     Weight: 80.7 kg (178 lb)  Body mass index is 24.83 kg/m².    Intake/Output Summary (Last 24 hours) at 1/11/2024 1356  Last data filed at 1/11/2024 0505  Gross per 24 hour   Intake --   Output 825 ml   Net -825 ml      Physical Exam  Gen: in NAD, appears stated age, appears chronically ill   Neuro: awake, oriented to self alone, not oriented to location, time or situation, motor, sensory, and strength grossly intact BL  HEENT: NTNC, EOMI, PERRL, MMM, Rt IJ trialysis, muddy sclera  CVS: RRR, sys ejection murmur, no rubs or gallops; S1/S2 auscultated with no S3 or S4; capillary refill < 2 sec  Resp: lungs CTAB, no w/r/r; no belabored breathing or accessory muscle use appreciated   Abd: BS+ in all 4 quadrants; NTND, soft to palpation; no organomegaly appreciated   Extrem: pulses full, equal, and regular over all 4 extremities; no UE edema, BL LE edema  : jackson in place    Significant Labs: All pertinent labs within the past 24 hours have been reviewed.  CBC:   Recent Labs   Lab 01/10/24  0504 01/11/24  0456   WBC 10.08 9.61   HGB 7.2* 7.4*   HCT 22.4* 24.2*    304     CMP:   Recent Labs   Lab 01/10/24  0504 01/11/24  0455    141   K 3.5 3.3*    107   CO2 22* 24   GLU 86 69*   BUN 62* 62*   CREATININE 5.2* 5.0*   CALCIUM 8.2* 8.5*   PROT 5.8* 6.1   ALBUMIN 1.8* 1.8*   BILITOT 0.5 0.6   ALKPHOS 84 95   AST 19 21   ALT <5*  "<5*   ANIONGAP 10 10     Cardiac Markers: No results for input(s): "CKMB", "MYOGLOBIN", "BNP", "TROPISTAT" in the last 48 hours.    Significant Imaging: I have reviewed all pertinent imaging results/findings within the past 24 hours.  "

## 2024-01-11 NOTE — SUBJECTIVE & OBJECTIVE
Interval History: Scr 5.0 from 5.2. 24 UOP 1.5L. TDC postponed. Plan for family meeting today at 1pm.      Review of patient's allergies indicates:  No Known Allergies  Current Facility-Administered Medications   Medication Frequency    0.9%  NaCl infusion (for blood administration) Q24H PRN    0.9%  NaCl infusion (for blood administration) Q24H PRN    0.9%  NaCl infusion (for blood administration) Q24H PRN    0.9%  NaCl infusion Continuous    0.9%  NaCl infusion PRN    acetaminophen tablet 1,000 mg Q8H    allopurinol split tablet 50 mg Daily    atorvastatin tablet 80 mg Daily    benzonatate capsule 100 mg TID PRN    dextrose 10% bolus 125 mL 125 mL PRN    dextrose 10% bolus 250 mL 250 mL PRN    famotidine tablet 20 mg Daily    folic acid tablet 1 mg Daily    glucagon (human recombinant) injection 1 mg PRN    glucose chewable tablet 16 g PRN    glucose chewable tablet 24 g PRN    heparin (porcine) 2,000 Units in sodium chloride 0.9% 1,000 mL PRN    heparin (porcine) injection 1,000 Units PRN    heparin 25,000 units in dextrose 5% (100 units/ml) IV bolus from bag - ADDITIONAL PRN BOLUS - 30 units/kg PRN    heparin 25,000 units in dextrose 5% (100 units/ml) IV bolus from bag - ADDITIONAL PRN BOLUS - 60 units/kg PRN    heparin 25,000 units in dextrose 5% 250 mL (100 units/mL) infusion LOW INTENSITY nomogram - [facility] Continuous    heparin infusion 1,000 units/500 ml in 0.9% NaCl (on sterile field) PRN    insulin aspart U-100 pen 0-5 Units QID (AC + HS) PRN    LIDOcaine 5 % patch 1 patch Q24H    LIDOcaine HCL 20 mg/ml (2%) injection PRN    magnesium oxide tablet 800 mg PRN    naloxone 0.4 mg/mL injection 0.02 mg PRN    NIFEdipine 24 hr tablet 30 mg Daily    potassium chloride CR capsule 30 mEq Q1H    senna-docusate 8.6-50 mg per tablet 2 tablet BID PRN    sodium chloride 0.9% bolus 250 mL 250 mL PRN    sodium chloride 0.9% flush 10 mL Q12H PRN    sodium chloride 0.9% flush 10 mL PRN       Objective:     Vital Signs (Most  Recent):  Temp: 97.8 °F (36.6 °C) (01/11/24 0756)  Pulse: 69 (01/11/24 0756)  Resp: 17 (01/11/24 0756)  BP: 129/60 (01/11/24 0756)  SpO2: 98 % (01/11/24 0756) Vital Signs (24h Range):  Temp:  [97.5 °F (36.4 °C)-98.1 °F (36.7 °C)] 97.8 °F (36.6 °C)  Pulse:  [62-79] 69  Resp:  [17-18] 17  SpO2:  [97 %-100 %] 98 %  BP: (119-137)/(57-63) 129/60     Weight: 80.7 kg (178 lb) (12/29/23 0703)  Body mass index is 24.83 kg/m².  Body surface area is 2.01 meters squared.    I/O last 3 completed shifts:  In: 440 [P.O.:440]  Out: 1825 [Urine:1825]     Physical Exam  Vitals and nursing note reviewed.   Constitutional:       General: He is not in acute distress.     Appearance: He is ill-appearing. He is not toxic-appearing.      Interventions: Nasal cannula in place.   HENT:      Head: Normocephalic.      Nose: Nose normal.      Mouth/Throat:      Mouth: Mucous membranes are dry.      Pharynx: No oropharyngeal exudate.   Cardiovascular:      Rate and Rhythm: Normal rate.   Abdominal:      General: Abdomen is flat.   Musculoskeletal:      Cervical back: Normal range of motion. No rigidity.      Right lower leg: No edema.      Left lower leg: No edema.   Lymphadenopathy:      Cervical: No cervical adenopathy.   Skin:     General: Skin is warm and dry.   Neurological:      Mental Status: He is easily aroused.   Psychiatric:         Behavior: Behavior is cooperative.          Significant Labs:  CBC:   Recent Labs   Lab 01/11/24 0456   WBC 9.61   RBC 2.85*   HGB 7.4*   HCT 24.2*      MCV 85   MCH 26.0*   MCHC 30.6*     CMP:   Recent Labs   Lab 01/11/24 0455   GLU 69*   CALCIUM 8.5*   ALBUMIN 1.8*   PROT 6.1      K 3.3*   CO2 24      BUN 62*   CREATININE 5.0*   ALKPHOS 95   ALT <5*   AST 21   BILITOT 0.6     All labs within the past 24 hours have been reviewed.

## 2024-01-11 NOTE — ASSESSMENT & PLAN NOTE
Baseline creatinine 2-2.5 (longstanding DM with bilateral osteomyelitis, HTN)  Multiple comorbitites as well     On admission serum creatinine 7.5  ELIZABETH appears to be multifactorial, possible prolonged pre-renal state/ATN from hemodynamic changes from CHB vs progressive CKD.   Obstruction relieved by jackson placed 12/28/23 with 500 cc of purulent UOP. Supposedly follows with urology in outpatient setting. Is on tamsulosin.   Pyelonephritis with UA showing 3+ leuks with many bacteria. History of proteus in past.   Low effective circulating volume suggested by physical exam, evidence of acute liver injury in setting of HFpEF with severe aortic stenosis. Home medication included metolazone 1 mg bid     Dialysis consent obtained 12/28/23  Renal US with no hydronephrosis   UCPR: 2.05  Azeem 22  Microalbumin / creatinine ratio: 647.3     Plan/Recommendation    -Patient will likely need RRT in the near future given recent ELIZABETH and CKD baseline prior to arrival. No acute indication for RRT at this time  -Agree with holding off line placement for now -Pending GOC discussions with POA  -Continue jackson and monitor UOP   -Strict ins and outs  -Avoid nephrotoxic agents if possible and renally dose medications  -Avoid drastic hemodynamic changes if possible

## 2024-01-11 NOTE — PROGRESS NOTES
[facility] - Cardiology Stepdown  Palliative Medicine  Progress Note    Patient Name: Chato Bowie  MRN: [id number]  Admission Date: 12/28/2023  Hospital Length of Stay: 14 days  Code Status: DNR   Attending Provider: Gabbi Small MD  Consulting Provider: Jose Duval MD  Primary Care Physician: Irlanda Valenzuela -  Principal Problem:Acute renal failure superimposed on stage 3b chronic kidney disease    Patient information was obtained from patient, past medical records, and primary team.      Assessment/Plan:     Cardiac/Vascular  Complete heart block  73m with HTN, DM2, ELIZABETH on CKD [facility], s/p TVP placement      -s/p micra placement    Renal/  * Acute renal failure superimposed on stage 3b chronic kidney disease  73m with CKD3 presented in complete heart block with ELIZABETH, underwent TVP--> micra as well as had trialysis line placed for iHD. Renal function remains poor but not acutely worsening (BL cr 2-3, now ~5.5). Continues to have -1000cc/day.    Nephrology recommending tunneled dialysis catheter placement and vascular access planning. Given patient's ongoing clinical decline and frailty with 10+ hospital admissions over the past year, now bedbound status (past several months), and poor quality of life, considerable concern should be given that he may not be a good outpatient HD candidate.     -after discussion with patient on 1/11, confident he understands the pros and cons of initiating HD, asks meaningful questions about what discontinuing would look like if he is not happy with his quality of life over the coming weeks  -appreciate nephrology expertise in commenting on pros/cons of initiating HD in this frail patient in terms of both prognosis and impact on quality of life    Palliative Care  Palliative care encounter  See ACP 1/3-1/11    Insight/goals of care- fair, understands limited nature of his life expectancy. Initially discussed  implications of not having permanent pacemaker placed, which would likely result in very limited life expectancy which did not seem acceptable to patient or his niece. Subsequently with conversations regarding other potential limits we might place on patient's care as he continues to decline including HD. Patient now clearly understanding pros/cons of continuing to pursue HD    Social support- Gulshan is SID, patient in nursing home prior to admit, unclear what level of support at home     Spiritual- did not assess    Symptom management- pain related to chronic LE wounds, pain well-controlled with current meds      Recommendations  -DNR, full support  -Leigh Gaffney is MPOA, no secondary decision-makers (sister is living but with dementia)  -1/11 long conversation with patient at bedside, he able to demonstrate fair understanding of pros/cons of pursuing outpatient HD  -reasonable to proceed with intermediate-term dialysis access and coordination despite significant barriers given nursing home and bedbound status  -continue oxycodone 10mg q4hr PRN for pain, hydromorphone 0.5mg IV q6hr PRN for breakthrough/wound care        I will follow-up with patient. Please contact us if you have any additional questions.    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 Underwent HD x3, with improvement in mental status. EP consulted to consider pacemaker placement. Palliative care  consulted to assist with goals of care.     Hospital Course:  No notes on file    Interval History: Chart reviewed including 24h medication use. Patient somnolent but arousable, conversant and lucid this afternoon, answering some complex questions. No active complaints. Niece did not come to hospital past 2 days despite plans for family meeting      Medications:  Continuous Infusions:   sodium chloride 0.9% Stopped (01/05/24 1455)    heparin (porcine) in D5W 9 Units/kg/hr (01/11/24 0907)     Scheduled Meds:   acetaminophen  1,000 mg Oral Q8H    allopurinoL  50 mg Oral Daily    atorvastatin  80 mg Oral Daily    famotidine  20 mg Oral Daily    folic acid  1 mg Oral Daily    LIDOcaine  1 patch Transdermal Q24H    NIFEdipine  30 mg Oral Daily     PRN Meds:0.9%  NaCl infusion (for blood administration), 0.9%  NaCl infusion (for blood administration), 0.9%  NaCl infusion (for blood administration), sodium chloride 0.9%, benzonatate, dextrose 10%, dextrose 10%, glucagon (human recombinant), glucose, glucose, heparin (porcine) 2,000 Units in sodium chloride 0.9% 1,000 mL, heparin (porcine), heparin (PORCINE), heparin (PORCINE), heparin (porcine), insulin aspart U-100, LIDOcaine HCL 20 mg/ml (2%), magnesium oxide, naloxone, senna-docusate 8.6-50 mg, sodium chloride 0.9%, sodium chloride 0.9%, sodium chloride 0.9%    Objective:     Vital Signs (Most Recent):  Temp: 97.3 °F (36.3 °C) (01/11/24 1218)  Pulse: 69 (01/11/24 1218)  Resp: 16 (01/11/24 1218)  BP: 125/60 (01/11/24 1218)  SpO2: 98 % (01/11/24 1218) Vital Signs (24h Range):  Temp:  [97.3 °F (36.3 °C)-98.1 °F (36.7 °C)] 97.3 °F (36.3 °C)  Pulse:  [62-70] 69  Resp:  [16-18] 16  SpO2:  [97 %-98 %] 98 %  BP: (119-129)/(57-61) 125/60     Weight: 80.7 kg (178 lb)  Body mass index is 24.83 kg/m².       Physical Exam  Vitals and nursing note reviewed.   Constitutional:       General: He is not in acute distress.     Appearance: He is ill-appearing. He is not toxic-appearing.  "  HENT:      Mouth/Throat:      Mouth: Mucous membranes are moist.   Eyes:      General: No scleral icterus.     Extraocular Movements: Extraocular movements intact.   Neck:      Comments:  trialysis right IJ  Pulmonary:      Effort: Pulmonary effort is normal. No respiratory distress.   Abdominal:      General: Abdomen is flat.      Palpations: Abdomen is soft.   Skin:     General: Skin is warm and dry.   Neurological:      Mental Status: He is alert.      Comments: Incoherent at times but answers some complex questions, oriented to location and situation                Advance Care Planning  Advance Directives:   Medical Power of : Yes    Goals of Care: Engaged patient in further discussion regarding need for dialysis. He is able to recall prior conversations and asks several questions about life with dialysis. He is able to state that dialysis would be likely 3 sessions per week when they "clean his blood for a few hours". He asked questions about how most people feel on HD, and I answered, noting some patients feel better with HD, although many note that the long sessions leave them feeling more fatigued. I also expressed my worry that further medicalizing his life will lead to more time in the hospital which he previously stated was not desirable. He then asked me "what should I do, doc?". I assured him that I could not answer this for him but that we are looking at a decision for care that might help him to live longer but will likely decrease his quality of life. He asked if it would make sense to start dialysis, see how he feels for a few weeks, and then stop if he is not happy with things. I affirmed that this is a reasonable approach to make if we are able to arrange for outpatient HD. I was unable to discuss the above with Leigh despite multiple attempts to reach her via telephone      CBC:   Recent Labs   Lab 01/11/24  0456   WBC 9.61   HGB 7.4*   HCT 24.2*   MCV 85        BMP:  Recent " Labs   Lab 01/11/24  0455   GLU 69*      K 3.3*      CO2 24   BUN 62*   CREATININE 5.0*   CALCIUM 8.5*   MG 2.2     LFT:  Lab Results   Component Value Date    AST 21 01/11/2024    ALKPHOS 95 01/11/2024    BILITOT 0.6 01/11/2024     Albumin:   Albumin   Date Value Ref Range Status   01/11/2024 1.8 (L) 3.5 - 5.2 g/dL Final     Protein:   Total Protein   Date Value Ref Range Status   01/11/2024 6.1 6.0 - 8.4 g/dL Final     Lactic acid:   Lab Results   Component Value Date    LACTATE 0.8 12/30/2023    LACTATE 0.8 11/27/2023       Reviewed CBC with stable blood counts, CMP with stable but poor renal function    In my care of this patient with acute on chronic severe illness with threat to life and/or bodily function, I am recommending goal-concordant care as noted above. I spent a significant amount of time reviewing external records/ recommendations of other providers, reviewing recent test results, and discussed care with other subspecialists involved    In addition to above, I spent 20 minutes specifically discussing advance care planning and goals of care with patient at bedside.       The above recommendations communicated directly to primary team on 1/11      Jose Duval MD  Palliative Medicine  [facility] - Cardiology Stepdown

## 2024-01-12 LAB
ALBUMIN SERPL BCP-MCNC: 1.8 G/DL (ref 3.5–5.2)
ALP SERPL-CCNC: 82 U/L (ref 55–135)
ALT SERPL W/O P-5'-P-CCNC: 5 U/L (ref 10–44)
ANION GAP SERPL CALC-SCNC: 9 MMOL/L (ref 8–16)
APTT PPP: 42.1 SEC (ref 21–32)
AST SERPL-CCNC: 19 U/L (ref 10–40)
BACTERIA #/AREA URNS AUTO: ABNORMAL /HPF
BASOPHILS # BLD AUTO: 0.11 K/UL (ref 0–0.2)
BASOPHILS NFR BLD: 1 % (ref 0–1.9)
BILIRUB SERPL-MCNC: 0.5 MG/DL (ref 0.1–1)
BILIRUB UR QL STRIP: NEGATIVE
BUN SERPL-MCNC: 56 MG/DL (ref 8–23)
CALCIUM SERPL-MCNC: 8.4 MG/DL (ref 8.7–10.5)
CHLORIDE SERPL-SCNC: 108 MMOL/L (ref 95–110)
CLARITY UR REFRACT.AUTO: ABNORMAL
CO2 SERPL-SCNC: 23 MMOL/L (ref 23–29)
COLOR UR AUTO: YELLOW
CREAT SERPL-MCNC: 4.6 MG/DL (ref 0.5–1.4)
DIFFERENTIAL METHOD BLD: ABNORMAL
EOSINOPHIL # BLD AUTO: 0.4 K/UL (ref 0–0.5)
EOSINOPHIL NFR BLD: 3.2 % (ref 0–8)
ERYTHROCYTE [DISTWIDTH] IN BLOOD BY AUTOMATED COUNT: 18.3 % (ref 11.5–14.5)
EST. GFR  (NO RACE VARIABLE): 12.7 ML/MIN/1.73 M^2
GLUCOSE SERPL-MCNC: 79 MG/DL (ref 70–110)
GLUCOSE UR QL STRIP: ABNORMAL
HCT VFR BLD AUTO: 22.2 % (ref 40–54)
HGB BLD-MCNC: 7 G/DL (ref 14–18)
HGB UR QL STRIP: ABNORMAL
HYALINE CASTS UR QL AUTO: 0 /LPF
IMM GRANULOCYTES # BLD AUTO: 0.06 K/UL (ref 0–0.04)
IMM GRANULOCYTES NFR BLD AUTO: 0.5 % (ref 0–0.5)
KETONES UR QL STRIP: NEGATIVE
LEUKOCYTE ESTERASE UR QL STRIP: ABNORMAL
LYMPHOCYTES # BLD AUTO: 1.3 K/UL (ref 1–4.8)
LYMPHOCYTES NFR BLD: 11 % (ref 18–48)
MAGNESIUM SERPL-MCNC: 2.1 MG/DL (ref 1.6–2.6)
MCH RBC QN AUTO: 26.3 PG (ref 27–31)
MCHC RBC AUTO-ENTMCNC: 31.5 G/DL (ref 32–36)
MCV RBC AUTO: 84 FL (ref 82–98)
MICROSCOPIC COMMENT: ABNORMAL
MONOCYTES # BLD AUTO: 1.3 K/UL (ref 0.3–1)
MONOCYTES NFR BLD: 11.4 % (ref 4–15)
NEUTROPHILS # BLD AUTO: 8.4 K/UL (ref 1.8–7.7)
NEUTROPHILS NFR BLD: 72.9 % (ref 38–73)
NITRITE UR QL STRIP: NEGATIVE
NRBC BLD-RTO: 0 /100 WBC
PH UR STRIP: 5 [PH] (ref 5–8)
PHOSPHATE SERPL-MCNC: 4.1 MG/DL (ref 2.7–4.5)
PLATELET # BLD AUTO: 307 K/UL (ref 150–450)
PMV BLD AUTO: 8.8 FL (ref 9.2–12.9)
POCT GLUCOSE: 103 MG/DL (ref 70–110)
POTASSIUM SERPL-SCNC: 3.7 MMOL/L (ref 3.5–5.1)
PROT SERPL-MCNC: 6.1 G/DL (ref 6–8.4)
PROT UR QL STRIP: ABNORMAL
RBC # BLD AUTO: 2.66 M/UL (ref 4.6–6.2)
RBC #/AREA URNS AUTO: 10 /HPF (ref 0–4)
SODIUM SERPL-SCNC: 140 MMOL/L (ref 136–145)
SP GR UR STRIP: 1.01 (ref 1–1.03)
SQUAMOUS #/AREA URNS AUTO: 3 /HPF
URN SPEC COLLECT METH UR: ABNORMAL
WBC # BLD AUTO: 11.45 K/UL (ref 3.9–12.7)
WBC #/AREA URNS AUTO: >100 /HPF (ref 0–5)
YEAST UR QL AUTO: ABNORMAL

## 2024-01-12 PROCEDURE — 84100 ASSAY OF PHOSPHORUS: CPT | Performed by: INTERNAL MEDICINE

## 2024-01-12 PROCEDURE — 25000003 PHARM REV CODE 250: Performed by: HOSPITALIST

## 2024-01-12 PROCEDURE — 87186 SC STD MICRODIL/AGAR DIL: CPT | Mod: 59 | Performed by: STUDENT IN AN ORGANIZED HEALTH CARE EDUCATION/TRAINING PROGRAM

## 2024-01-12 PROCEDURE — 80053 COMPREHEN METABOLIC PANEL: CPT | Performed by: INTERNAL MEDICINE

## 2024-01-12 PROCEDURE — 25000003 PHARM REV CODE 250: Performed by: STUDENT IN AN ORGANIZED HEALTH CARE EDUCATION/TRAINING PROGRAM

## 2024-01-12 PROCEDURE — 85730 THROMBOPLASTIN TIME PARTIAL: CPT | Performed by: STUDENT IN AN ORGANIZED HEALTH CARE EDUCATION/TRAINING PROGRAM

## 2024-01-12 PROCEDURE — 87184 SC STD DISK METHOD PER PLATE: CPT | Performed by: STUDENT IN AN ORGANIZED HEALTH CARE EDUCATION/TRAINING PROGRAM

## 2024-01-12 PROCEDURE — 25000003 PHARM REV CODE 250

## 2024-01-12 PROCEDURE — 27000207 HC ISOLATION

## 2024-01-12 PROCEDURE — 81001 URINALYSIS AUTO W/SCOPE: CPT | Performed by: STUDENT IN AN ORGANIZED HEALTH CARE EDUCATION/TRAINING PROGRAM

## 2024-01-12 PROCEDURE — 20600001 HC STEP DOWN PRIVATE ROOM

## 2024-01-12 PROCEDURE — 25000003 PHARM REV CODE 250: Performed by: INTERNAL MEDICINE

## 2024-01-12 PROCEDURE — 87077 CULTURE AEROBIC IDENTIFY: CPT | Performed by: STUDENT IN AN ORGANIZED HEALTH CARE EDUCATION/TRAINING PROGRAM

## 2024-01-12 PROCEDURE — 99233 SBSQ HOSP IP/OBS HIGH 50: CPT | Mod: ,,, | Performed by: INTERNAL MEDICINE

## 2024-01-12 PROCEDURE — 87088 URINE BACTERIA CULTURE: CPT | Performed by: STUDENT IN AN ORGANIZED HEALTH CARE EDUCATION/TRAINING PROGRAM

## 2024-01-12 PROCEDURE — 83735 ASSAY OF MAGNESIUM: CPT | Performed by: INTERNAL MEDICINE

## 2024-01-12 PROCEDURE — 87086 URINE CULTURE/COLONY COUNT: CPT | Performed by: STUDENT IN AN ORGANIZED HEALTH CARE EDUCATION/TRAINING PROGRAM

## 2024-01-12 PROCEDURE — 85025 COMPLETE CBC W/AUTO DIFF WBC: CPT | Performed by: INTERNAL MEDICINE

## 2024-01-12 RX ORDER — POTASSIUM CHLORIDE 750 MG/1
30 CAPSULE, EXTENDED RELEASE ORAL ONCE
Status: COMPLETED | OUTPATIENT
Start: 2024-01-12 | End: 2024-01-12

## 2024-01-12 RX ADMIN — ACETAMINOPHEN 1000 MG: 500 TABLET ORAL at 09:01

## 2024-01-12 RX ADMIN — ALLOPURINOL 50 MG: 300 TABLET ORAL at 09:01

## 2024-01-12 RX ADMIN — POTASSIUM CHLORIDE 30 MEQ: 10 CAPSULE, COATED, EXTENDED RELEASE ORAL at 09:01

## 2024-01-12 RX ADMIN — APIXABAN 5 MG: 5 TABLET, FILM COATED ORAL at 08:01

## 2024-01-12 RX ADMIN — ACETAMINOPHEN 1000 MG: 500 TABLET ORAL at 03:01

## 2024-01-12 RX ADMIN — NIFEDIPINE 30 MG: 30 TABLET, FILM COATED, EXTENDED RELEASE ORAL at 09:01

## 2024-01-12 RX ADMIN — ATORVASTATIN CALCIUM 80 MG: 40 TABLET, FILM COATED ORAL at 09:01

## 2024-01-12 RX ADMIN — FOLIC ACID 1 MG: 1 TABLET ORAL at 09:01

## 2024-01-12 RX ADMIN — FAMOTIDINE 20 MG: 20 TABLET, FILM COATED ORAL at 09:01

## 2024-01-12 NOTE — SUBJECTIVE & OBJECTIVE
Interval History: Scr 4.6 from 5.0 yesterday. Electrolytes stable. Continues with good UOP.     Review of patient's allergies indicates:  No Known Allergies  Current Facility-Administered Medications   Medication Frequency    0.9%  NaCl infusion (for blood administration) Q24H PRN    0.9%  NaCl infusion (for blood administration) Q24H PRN    0.9%  NaCl infusion (for blood administration) Q24H PRN    0.9%  NaCl infusion Continuous    0.9%  NaCl infusion PRN    acetaminophen tablet 1,000 mg Q8H    allopurinol split tablet 50 mg Daily    atorvastatin tablet 80 mg Daily    benzonatate capsule 100 mg TID PRN    dextrose 10% bolus 125 mL 125 mL PRN    dextrose 10% bolus 250 mL 250 mL PRN    famotidine tablet 20 mg Daily    folic acid tablet 1 mg Daily    glucagon (human recombinant) injection 1 mg PRN    glucose chewable tablet 16 g PRN    glucose chewable tablet 24 g PRN    heparin (porcine) 2,000 Units in sodium chloride 0.9% 1,000 mL PRN    heparin (porcine) injection 1,000 Units PRN    heparin 25,000 units in dextrose 5% (100 units/ml) IV bolus from bag - ADDITIONAL PRN BOLUS - 30 units/kg PRN    heparin 25,000 units in dextrose 5% (100 units/ml) IV bolus from bag - ADDITIONAL PRN BOLUS - 60 units/kg PRN    heparin 25,000 units in dextrose 5% 250 mL (100 units/mL) infusion LOW INTENSITY nomogram - [facility] Continuous    heparin infusion 1,000 units/500 ml in 0.9% NaCl (on sterile field) PRN    insulin aspart U-100 pen 0-5 Units QID (AC + HS) PRN    LIDOcaine 5 % patch 1 patch Q24H    LIDOcaine HCL 20 mg/ml (2%) injection PRN    magnesium oxide tablet 800 mg PRN    naloxone 0.4 mg/mL injection 0.02 mg PRN    NIFEdipine 24 hr tablet 30 mg Daily    senna-docusate 8.6-50 mg per tablet 2 tablet BID PRN    sodium chloride 0.9% bolus 250 mL 250 mL PRN    sodium chloride 0.9% flush 10 mL Q12H PRN    sodium chloride 0.9% flush 10 mL PRN       Objective:     Vital Signs (Most Recent):  Temp: 98 °F (36.7 °C) (01/12/24 0824)  Pulse:  78 (01/12/24 0824)  Resp: 20 (01/12/24 0824)  BP: (!) 123/58 (01/12/24 0824)  SpO2: 100 % (01/12/24 0824) Vital Signs (24h Range):  Temp:  [97 °F (36.1 °C)-98.2 °F (36.8 °C)] 98 °F (36.7 °C)  Pulse:  [62-78] 78  Resp:  [16-20] 20  SpO2:  [98 %-100 %] 100 %  BP: (119-139)/(57-62) 123/58     Weight: 80.7 kg (178 lb) (12/29/23 0703)  Body mass index is 24.83 kg/m².  Body surface area is 2.01 meters squared.    I/O last 3 completed shifts:  In: 240 [P.O.:240]  Out: 1635 [Urine:1635]     Physical Exam  Vitals and nursing note reviewed.   Constitutional:       General: He is not in acute distress.     Appearance: He is ill-appearing. He is not toxic-appearing.      Interventions: Nasal cannula in place.   HENT:      Head: Normocephalic.      Nose: Nose normal.      Mouth/Throat:      Mouth: Mucous membranes are dry.      Pharynx: No oropharyngeal exudate.   Cardiovascular:      Rate and Rhythm: Normal rate.   Abdominal:      General: Abdomen is flat.   Musculoskeletal:      Cervical back: Normal range of motion. No rigidity.      Right lower leg: No edema.      Left lower leg: No edema.   Lymphadenopathy:      Cervical: No cervical adenopathy.   Skin:     General: Skin is warm and dry.   Neurological:      Mental Status: He is easily aroused.   Psychiatric:         Behavior: Behavior is cooperative.          Significant Labs:  CBC:   Recent Labs   Lab 01/12/24 0412   WBC 11.45   RBC 2.66*   HGB 7.0*   HCT 22.2*      MCV 84   MCH 26.3*   MCHC 31.5*     CMP:   Recent Labs   Lab 01/12/24 0412   GLU 79   CALCIUM 8.4*   ALBUMIN 1.8*   PROT 6.1      K 3.7   CO2 23      BUN 56*   CREATININE 4.6*   ALKPHOS 82   ALT 5*   AST 19   BILITOT 0.5     All labs within the past 24 hours have been reviewed.

## 2024-01-12 NOTE — ASSESSMENT & PLAN NOTE
- improved, likely 2/2 uremic encephalopathy and UTI on admission  - some delirium today- possibility of hospital acquired?   - Delirium precautions  - will remove jackson  - repeat UA

## 2024-01-12 NOTE — PLAN OF CARE
Problem: Infection  Goal: Absence of Infection Signs and Symptoms  Outcome: Ongoing, Progressing     Problem: Adult Inpatient Plan of Care  Goal: Plan of Care Review  Outcome: Ongoing, Progressing  Goal: Patient-Specific Goal (Individualized)  Outcome: Ongoing, Progressing  Goal: Optimal Comfort and Wellbeing  Outcome: Ongoing, Progressing  Goal: Readiness for Transition of Care  Outcome: Ongoing, Progressing     Problem: Diabetes Comorbidity  Goal: Blood Glucose Level Within Targeted Range  Outcome: Ongoing, Progressing     Problem: Fluid and Electrolyte Imbalance (Acute Kidney Injury/Impairment)  Goal: Fluid and Electrolyte Balance  Outcome: Ongoing, Progressing     Problem: Renal Function Impairment (Acute Kidney Injury/Impairment)  Goal: Effective Renal Function  Outcome: Ongoing, Progressing     Problem: Impaired Wound Healing  Goal: Optimal Wound Healing  Outcome: Ongoing, Progressing     Problem: Coping Ineffective  Goal: Effective Coping  Outcome: Ongoing, Progressing

## 2024-01-12 NOTE — PROGRESS NOTES
"Referral successfully faxed to [facility] central intake for outpt dialysis coordination.    Pt's dialysis referral currently pending medical and financial approval at [facility].    SW will continue to follow with updates.    Kay Issa, LMSW [facility]  [phone]      Your fax has been successfully sent to [phone] at [phone].  ------------------------------------------------------------  From: [id number]  ------------------------------------------------------------  1/11/2024 4:15:22 PM Transmission Record          Sent to [phone] with remote ID "          Result: (4/3;0/0) Line broken (no loop current)          Page record: NONE SENT          Elapsed time: 00:03 on channel 44    1/11/2024 4:20:41 PM Transmission Record          Sent to [phone] with remote ID "[email or web address]"          Result: (0/339;0/0) Success          Page record: 1 - 47          Elapsed time: 26:04 on channel 43    "

## 2024-01-12 NOTE — NURSING
Patient refused 2200 medication, very angry and combative tonight.    Removed leads and very combative when trying to put them back on. After several attempts he calmed down and leads were replaced.

## 2024-01-12 NOTE — PROGRESS NOTES
Per inpt team, pt no longer in need of outpt dialysis. Referral [place] [facility] [place] canceled.    SW will sign off, please reconsult if needed.    Kay Issa LMSW  [facility]  [phone]

## 2024-01-12 NOTE — PROGRESS NOTES
[facility] - Cardiology [facility] Medicine  Progress Note    Patient Name: Chato Bowie  MRN: [id number]  Patient Class: IP- Inpatient   Admission Date: 12/28/2023  Length of Stay: 15 days  Attending Physician: Gabbi Small MD  Primary Care Provider: Irlanda Valenzuela -        Subjective:     Principal Problem:Acute renal failure superimposed on stage 3b chronic kidney disease        HPI:  73yoM w/PMHx of T2DM, HTN, HLD, CHF, CAD, HLD, CKD 3b, alcohol use disorder, and multiple thyroid nodules presented from [facility] with RLQ pain, fatigue, bradycardia ~30's, weakness, and lab abnormalities including Cr 7.1 and . 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  On interview patient lethargic and disoriented, history obtained via chart review.    In ED pt AF, bradycardic ~40's, RR in the mid 20's, BP ~110/50, satting 100% on 2L NC. Labs significant for Hb 7.2, ANC 8.3, Na 132, Cl 87, HCO3 23, AG 22, , Cr 7.5, phos 8.3, Albumin 1.9, AST 74, , Lipase 146. UA with 2+ protein, 2+ occult blood, 3+ leukocytes, >100WBC and many bacteria. EKG showed wide QRS with LBBB, vent rate 45, QT/QTc 652/563. CXR showed mild retraction of R-sided PICC into mid SVC. CT A&P in progress. In ED jackson placed, started on CTX, given fluids, nephrology consulted. Admitting to [facility] for further management.     Overview/Hospital Course:  Admitted for CHB which EP thought to be 2/2 uremia/ELIZABETH and infection. Has not required pacing and is HDS. Recommended holding AV iker agents. BUN and sCr newly elevated, seen by nephrology who started dialysis for clearance. Tolerated dialysis with plans to further dialyze. ID recommended erta x 7 days for ESBL EColi UTI. Continued daptomycin for his BL heel OM.  C/b delirium for which CT head which was unremarkable. Stepped  down to  but came back to CCU after he went into torsades on 12/31. CPR was initiated and pt regained pulse and consciousness; bradycardic post event with HR ~50. Electrolytes replaced. TVP placed in CCU; spoke with SID who confirmed a DNR status but that she would want a pacemaker placed. EP consulted for PPM insertion. Had another episode of torsades (~ 2 mins) that became CHB and then was paced to NSR. TVP placed. Palliative care consulted for GOC/ACP. Pt more alerted and oriented, states he would like to proceed with the procedure, Micra pacemaker placed on 1/5. Bleeding from groin site with BPs in 80/50s that recovered with 1 L NS bolus. Stepped down to hospital medicine 1/5. Received 2 unit prbc's 1/5-1/6 with stable H/H since. Nephrology feels that patient will need long-term dialysis so Interventional Nephro planning line placement 1/11.  Heparin gtt in lieu of Eliquis prior to procedure.     Palliative care with ongoing GOC discussion for TDC vs. Home w/hospice. On further review with nephrology and patient's overall renal function, he was having good UOP and stabilization of renal function. Ok to hold off on TDC placement and outpatient HD arrangement. Stopped heparin gtt 01/12 and resumed home eliquis.    Interval History: No complaints per patient this am- some confusion per POA and RN- but improvement on my visit. He is understanding of holding further TDC placement at this time- no indication for outpatient HD per nephrology. Spoke to patient and his wife's  No chest pain, sob, nausea, vomiting. Good UOP.     Objective:     Vital Signs (Most Recent):  Temp: 98.2 °F (36.8 °C) (01/12/24 1526)  Pulse: 74 (01/12/24 1526)  Resp: 20 (01/12/24 1526)  BP: (!) 124/58 (01/12/24 1526)  SpO2: 99 % (01/12/24 1526) Vital Signs (24h Range):  Temp:  [97 °F (36.1 °C)-98.2 °F (36.8 °C)] 98.2 °F (36.8 °C)  Pulse:  [64-90] 74  Resp:  [19-20] 20  SpO2:  [98 %-100 %] 99 %  BP: (119-139)/(57-62) 124/58     Weight: 80.7 kg  "(178 lb)  Body mass index is 24.83 kg/m².    Intake/Output Summary (Last 24 hours) at 1/12/2024 1605  Last data filed at 1/12/2024 1357  Gross per 24 hour   Intake 582 ml   Output 1410 ml   Net -828 ml      Physical Exam  Gen: in NAD, appears stated age, appears chronically ill   Neuro: awake, oriented to self alone, not oriented to location, time or situation, motor, sensory, and strength grossly intact BL  HEENT: NTNC, EOMI, PERRL, MMM, Rt IJ trialysis, muddy sclera  CVS: RRR, sys ejection murmur, no rubs or gallops; S1/S2 auscultated with no S3 or S4; capillary refill < 2 sec  Resp: lungs CTAB, no w/r/r; no belabored breathing or accessory muscle use appreciated   Abd: BS+ in all 4 quadrants; NTND, soft to palpation; no organomegaly appreciated   Extrem: pulses full, equal, and regular over all 4 extremities; no UE edema, BL LE edema  : jackson in place    Significant Labs: All pertinent labs within the past 24 hours have been reviewed.  CBC:   Recent Labs   Lab 01/11/24  0456 01/12/24  0412   WBC 9.61 11.45   HGB 7.4* 7.0*   HCT 24.2* 22.2*    307     CMP:   Recent Labs   Lab 01/11/24  0455 01/12/24  0412    140   K 3.3* 3.7    108   CO2 24 23   GLU 69* 79   BUN 62* 56*   CREATININE 5.0* 4.6*   CALCIUM 8.5* 8.4*   PROT 6.1 6.1   ALBUMIN 1.8* 1.8*   BILITOT 0.6 0.5   ALKPHOS 95 82   AST 21 19   ALT <5* 5*   ANIONGAP 10 9     Cardiac Markers: No results for input(s): "CKMB", "MYOGLOBIN", "BNP", "TROPISTAT" in the last 48 hours.    Significant Imaging: I have reviewed all pertinent imaging results/findings within the past 24 hours.    Assessment/Plan:      * Acute renal failure superimposed on stage 3b chronic kidney disease  - Admitted with ELIZABETH on CKD  - Initial  with sCr 7.5; improving with dialysis  - Nephrology consulted, started on HD for clearance  - Still making some urine  - Per nephro- ok to hold on TDC- no need to arrange outpatient HD at this time, will need repeat renal " function panel 3 days after discharge and close nephro follow-up- patient and POA updated with regards to plan         Irritant contact dermatitis due to fecal, urinary or dual incontinence  - wound care following       Palliative care encounter        Torsades de pointes  - h/o torsades    Complete heart block  - Patient with complete heart block, previous EKG with first degree AV block and LBBB  - Patient on metoprolol and amiodarone as an outpatient  - HR in the 30s on admission  - EP consulted, thought it could be 2/2 uremia/ELIZABETH/infection; held off on TVP initially  - Stepped down to  with plans to see how he improved with dialysis  - However went into torsades on 12/31  - TVP placed by IC; s/p Micra insertion with EP on 1/5    Osteomyelitis of foot  - admitted 11/27 for BL wounds- OM of BL calcaneus and 5th metatarsal- found to be MRSA +iv  - s/p bone biopsy and debridement via podiatriy  - on 6wks of IV dapto- EOT 01/10/24    Advanced care planning/counseling discussion  - palliative following      Acute encephalopathy  - improved, likely 2/2 uremic encephalopathy and UTI on admission  - some delirium today- possibility of hospital acquired?   - Delirium precautions  - will remove jackson  - repeat UA    Bradycardia  - see CHB    Severe malnutrition  Nutrition consulted. Most recent weight and BMI pending    Measurements:  Wt Readings from Last 1 Encounters:   12/29/23 80.7 kg (178 lb)   Body mass index is 24.83 kg/m².    Patient will been screened and assessed by RD.    Malnutrition Type:  Context: chronic illness  Level: severe    Malnutrition Characteristic Summary:  Weight Loss (Malnutrition): other (see comments) (15% x 3 months)  Energy Intake (Malnutrition): less than 75% for greater than or equal to 3 months  Subcutaneous Fat (Malnutrition): mild depletion  Muscle Mass (Malnutrition): moderate depletion    Interventions/Recommendations (treatment strategy):  1)      UTI (urinary tract infection)  - s/p  7d of ertapenem for ESBL E coli    Paroxysmal atrial flutter  - stop heparin gtt  - resume eliquis 5mg BID    Severe aortic valve stenosis  - tte/ 12/29  Summary         Left Ventricle: The left ventricle is normal in size. Ventricular mass is normal. Normal wall thickness. There is concentric remodeling. Septal motion is consistent with bundle branch block. There is normal systolic function with a visually estimated ejection fraction of 55 - 60%. Grade II diastolic dysfunction.    Right Ventricle: Normal right ventricular cavity size. Systolic function is normal.    Left Atrium: Left atrium is severely dilated.    Aortic Valve: There is moderate aortic valve sclerosis. Moderately restricted motion. There is severe stenosis. Aortic valve area by VTI is 0.86 cm². Aortic valve peak velocity is 4.12 m/s. Mean gradient is 43 mmHg. The dimensionless index is 0.22. There is mild to moderate aortic regurgitation.    Mitral Valve: There is bileaflet sclerosis. There is mild annular dilation present.    Tricuspid Valve: There is mild to moderate regurgitation.    Pulmonary Artery: The estimated pulmonary artery systolic pressure is 72 mmHg.    IVC/SVC: Intermediate venous pressure at 8 mmHg.    - outpatient f/u with interventional cardiology      Hypokalemia  Patient has hypokalemia which is Acute and currently controlled. Most recent potassium levels reviewed-   Lab Results   Component Value Date    K 3.7 01/12/2024   . Will continue potassium replacement per protocol and recheck repeat levels after replacement completed.     GERD (gastroesophageal reflux disease)  - continue PPI      Anemia  - H:H stable, has required about 4u of pRBC during hospitalization  - multifactorial- reported h/o melena- would need EGD/colon, but has not been stable- will need outpatient f/u if patient does not decide to enroll in hospice;also with acute renal failure- also contributing to anemia  - slight decrease in Hg today- per nephro- will  "give epo  - monitor trend- no obvious source of blood loss at this time  - repeat H:H In the AM    Type 2 diabetes mellitus with kidney complication, with long-term current use of insulin  Patient's FSGs are controlled on current medication regimen.  Last A1c reviewed-   Lab Results   Component Value Date    HGBA1C 5.5 09/20/2023     Most recent fingerstick glucose reviewed- No results for input(s): "POCTGLUCOSE" in the last 24 hours.    Current correctional scale  Low  Maintain anti-hyperglycemic dose as follows-   Antihyperglycemics (From admission, onward)      Start     Stop Route Frequency Ordered    12/28/23 1818  insulin aspart U-100 pen 0-5 Units         -- SubQ Before meals & nightly PRN 12/28/23 1719          Hold Oral hypoglycemics while patient is in the hospital.    Dyslipidemia  - Cont home statin      Epigastric abdominal pain  - resolved        VTE Risk Mitigation (From admission, onward)           Ordered     apixaban tablet 5 mg  2 times daily         01/12/24 1615     heparin (porcine) 2,000 Units in sodium chloride 0.9% 1,000 mL  As needed (PRN)         01/05/24 1025     heparin infusion 1,000 units/500 ml in 0.9% NaCl (on sterile field)  As needed (PRN)         01/05/24 1024     heparin (porcine) injection 1,000 Units  As needed (PRN)         12/30/23 1056     IP VTE HIGH RISK PATIENT  Once         12/28/23 1719     Place sequential compression device  Until discontinued         12/28/23 1719                    Discharge Planning   ELY: 1/13/2024     Code Status: DNR   Is the patient medically ready for discharge?: No    Reason for patient still in hospital (select all that apply): Patient trending condition  Discharge Plan A: New Nursing Home placement - group home care facility (for stretcher dialysis)                    Gabbi Small MD  Department of Hospital Medicine   [facility] - Cardiology Stepdown    "

## 2024-01-12 NOTE — PLAN OF CARE
CHW met with patient/family at bedside. Patient experience rounding completed and reviewed the following.     Do you know your discharge plan? Yes or No,    If yes, what is the plan? (Home, Home Health, Rehab, SNF, LTAC, or Other) Yes NH    Have you discussed your needs and preferences with your SW/CM? Yes or No  Yes NH    If you are discharging home, do you have help at home? Yes or No Yes NH    Do you think you will need help additional at home at discharge? Yes or No  No    Do you currently have difficulty keeping up with bills, affording medicine or buying food? Yes or No No    Assigned SW/CM notified of any patient/family needs or concerns. Appropriate resources provided to address patient's needs.  Veena Herbert CHW  Case Management   [phone]

## 2024-01-12 NOTE — PT/OT/SLP DISCHARGE
Occupational Therapy Discharge    Screened and discharged pt from OT services this date 2/2 severe pain impacting any participation with functional mobility and ADLs with therapy. Pt is not appropriate for skilled OT services at this time. Please re-consult if any changes.

## 2024-01-12 NOTE — ASSESSMENT & PLAN NOTE
- Admitted with ELIZABETH on CKD  - Initial  with sCr 7.5; improving with dialysis  - Nephrology consulted, started on HD for clearance  - Still making some urine  - Per nephro- ok to hold on TDC- no need to arrange outpatient HD at this time, will need repeat renal function panel 3 days after discharge and close nephro follow-up- patient and POA updated with regards to plan        see hpi

## 2024-01-12 NOTE — SUBJECTIVE & OBJECTIVE
Interval History: No complaints per patient this am- some confusion per POA and RN- but improvement on my visit. He is understanding of holding further TDC placement at this time- no indication for outpatient HD per nephrology. Spoke to patient and his wife's  No chest pain, sob, nausea, vomiting. Good UOP.     Objective:     Vital Signs (Most Recent):  Temp: 98.2 °F (36.8 °C) (01/12/24 1526)  Pulse: 74 (01/12/24 1526)  Resp: 20 (01/12/24 1526)  BP: (!) 124/58 (01/12/24 1526)  SpO2: 99 % (01/12/24 1526) Vital Signs (24h Range):  Temp:  [97 °F (36.1 °C)-98.2 °F (36.8 °C)] 98.2 °F (36.8 °C)  Pulse:  [64-90] 74  Resp:  [19-20] 20  SpO2:  [98 %-100 %] 99 %  BP: (119-139)/(57-62) 124/58     Weight: 80.7 kg (178 lb)  Body mass index is 24.83 kg/m².    Intake/Output Summary (Last 24 hours) at 1/12/2024 1605  Last data filed at 1/12/2024 1357  Gross per 24 hour   Intake 582 ml   Output 1410 ml   Net -828 ml      Physical Exam  Gen: in NAD, appears stated age, appears chronically ill   Neuro: awake, oriented to self alone, not oriented to location, time or situation, motor, sensory, and strength grossly intact BL  HEENT: NTNC, EOMI, PERRL, MMM, Rt IJ trialysis, muddy sclera  CVS: RRR, sys ejection murmur, no rubs or gallops; S1/S2 auscultated with no S3 or S4; capillary refill < 2 sec  Resp: lungs CTAB, no w/r/r; no belabored breathing or accessory muscle use appreciated   Abd: BS+ in all 4 quadrants; NTND, soft to palpation; no organomegaly appreciated   Extrem: pulses full, equal, and regular over all 4 extremities; no UE edema, BL LE edema  : jackson in place    Significant Labs: All pertinent labs within the past 24 hours have been reviewed.  CBC:   Recent Labs   Lab 01/11/24  0456 01/12/24 0412   WBC 9.61 11.45   HGB 7.4* 7.0*   HCT 24.2* 22.2*    307     CMP:   Recent Labs   Lab 01/11/24  0455 01/12/24 0412    140   K 3.3* 3.7    108   CO2 24 23   GLU 69* 79   BUN 62* 56*   CREATININE 5.0* 4.6*  "  CALCIUM 8.5* 8.4*   PROT 6.1 6.1   ALBUMIN 1.8* 1.8*   BILITOT 0.6 0.5   ALKPHOS 95 82   AST 21 19   ALT <5* 5*   ANIONGAP 10 9     Cardiac Markers: No results for input(s): "CKMB", "MYOGLOBIN", "BNP", "TROPISTAT" in the last 48 hours.    Significant Imaging: I have reviewed all pertinent imaging results/findings within the past 24 hours.  "

## 2024-01-12 NOTE — PT/OT/SLP PROGRESS
Physical Therapy Discharge       Patient Name:  Chato Bowie   MRN:  [id number]    Pt unable to participate in therapy services due to severe foot pain. Continue ROM and positioning with nursing staff. Please re-consult if pt's pain becomes controlled. DC acute PT 1/12.

## 2024-01-12 NOTE — PROGRESS NOTES
[facility] - Cardiology Stepdown  Nephrology  Progress Note    Patient Name: Chato Bowie  MRN: [id number]  Admission Date: 12/28/2023  Hospital Length of Stay: 15 days  Attending Provider: Gabbi Small MD   Primary Care Physician: Irlanda Valenzuela -  Principal Problem:Acute renal failure superimposed on stage 3b chronic kidney disease    Subjective:     Interval History: Scr 4.6 from 5.0 yesterday. Electrolytes stable. Continues with good UOP.     Review of patient's allergies indicates:  No Known Allergies  Current Facility-Administered Medications   Medication Frequency    0.9%  NaCl infusion (for blood administration) Q24H PRN    0.9%  NaCl infusion (for blood administration) Q24H PRN    0.9%  NaCl infusion (for blood administration) Q24H PRN    0.9%  NaCl infusion Continuous    0.9%  NaCl infusion PRN    acetaminophen tablet 1,000 mg Q8H    allopurinol split tablet 50 mg Daily    atorvastatin tablet 80 mg Daily    benzonatate capsule 100 mg TID PRN    dextrose 10% bolus 125 mL 125 mL PRN    dextrose 10% bolus 250 mL 250 mL PRN    famotidine tablet 20 mg Daily    folic acid tablet 1 mg Daily    glucagon (human recombinant) injection 1 mg PRN    glucose chewable tablet 16 g PRN    glucose chewable tablet 24 g PRN    heparin (porcine) 2,000 Units in sodium chloride 0.9% 1,000 mL PRN    heparin (porcine) injection 1,000 Units PRN    heparin 25,000 units in dextrose 5% (100 units/ml) IV bolus from bag - ADDITIONAL PRN BOLUS - 30 units/kg PRN    heparin 25,000 units in dextrose 5% (100 units/ml) IV bolus from bag - ADDITIONAL PRN BOLUS - 60 units/kg PRN    heparin 25,000 units in dextrose 5% 250 mL (100 units/mL) infusion LOW INTENSITY nomogram - [facility] Continuous    heparin infusion 1,000 units/500 ml in 0.9% NaCl (on sterile field) PRN    insulin aspart U-100 pen 0-5 Units QID (AC + HS) PRN    LIDOcaine 5 % patch 1 patch Q24H    LIDOcaine HCL 20 mg/ml (2%) injection PRN    magnesium oxide tablet 800 mg PRN     naloxone 0.4 mg/mL injection 0.02 mg PRN    NIFEdipine 24 hr tablet 30 mg Daily    senna-docusate 8.6-50 mg per tablet 2 tablet BID PRN    sodium chloride 0.9% bolus 250 mL 250 mL PRN    sodium chloride 0.9% flush 10 mL Q12H PRN    sodium chloride 0.9% flush 10 mL PRN       Objective:     Vital Signs (Most Recent):  Temp: 98 °F (36.7 °C) (01/12/24 0824)  Pulse: 78 (01/12/24 0824)  Resp: 20 (01/12/24 0824)  BP: (!) 123/58 (01/12/24 0824)  SpO2: 100 % (01/12/24 0824) Vital Signs (24h Range):  Temp:  [97 °F (36.1 °C)-98.2 °F (36.8 °C)] 98 °F (36.7 °C)  Pulse:  [62-78] 78  Resp:  [16-20] 20  SpO2:  [98 %-100 %] 100 %  BP: (119-139)/(57-62) 123/58     Weight: 80.7 kg (178 lb) (12/29/23 0703)  Body mass index is 24.83 kg/m².  Body surface area is 2.01 meters squared.    I/O last 3 completed shifts:  In: 240 [P.O.:240]  Out: 1635 [Urine:1635]     Physical Exam  Vitals and nursing note reviewed.   Constitutional:       General: He is not in acute distress.     Appearance: He is ill-appearing. He is not toxic-appearing.      Interventions: Nasal cannula in place.   HENT:      Head: Normocephalic.      Nose: Nose normal.      Mouth/Throat:      Mouth: Mucous membranes are dry.      Pharynx: No oropharyngeal exudate.   Cardiovascular:      Rate and Rhythm: Normal rate.   Abdominal:      General: Abdomen is flat.   Musculoskeletal:      Cervical back: Normal range of motion. No rigidity.      Right lower leg: No edema.      Left lower leg: No edema.   Lymphadenopathy:      Cervical: No cervical adenopathy.   Skin:     General: Skin is warm and dry.   Neurological:      Mental Status: He is easily aroused.   Psychiatric:         Behavior: Behavior is cooperative.          Significant Labs:  CBC:   Recent Labs   Lab 01/12/24  0412   WBC 11.45   RBC 2.66*   HGB 7.0*   HCT 22.2*      MCV 84   MCH 26.3*   MCHC 31.5*     CMP:   Recent Labs   Lab 01/12/24  0412   GLU 79   CALCIUM 8.4*   ALBUMIN 1.8*   PROT 6.1      K 3.7    CO2 23      BUN 56*   CREATININE 4.6*   ALKPHOS 82   ALT 5*   AST 19   BILITOT 0.5     All labs within the past 24 hours have been reviewed.         Assessment/Plan:     Cardiac/Vascular  Complete heart block  - defer to primary team     Renal/  * Acute renal failure superimposed on stage 3b chronic kidney disease  Baseline creatinine 2-2.5 (longstanding DM with bilateral osteomyelitis, HTN)  Multiple comorbitites as well     On admission serum creatinine 7.5  ELIZABETH appears to be multifactorial, possible prolonged pre-renal state/ATN from hemodynamic changes from CHB vs progressive CKD.   Obstruction relieved by jackson placed 12/28/23 with 500 cc of purulent UOP. Supposedly follows with urology in outpatient setting. Is on tamsulosin.   Pyelonephritis with UA showing 3+ leuks with many bacteria. History of proteus in past.   Low effective circulating volume suggested by physical exam, evidence of acute liver injury in setting of HFpEF with severe aortic stenosis. Home medication included metolazone 1 mg bid     Dialysis consent obtained 12/28/23  Renal US with no hydronephrosis   UCPR: 2.05  Azeem 22  Microalbumin / creatinine ratio: 647.3     Plan/Recommendation    -In light of Scr improving/improving to new baseline, ok to d/c home with close nephrology follow up for long term dialysis planning and labs in 3 days  -Plan discussed with staff   -Strict ins and outs  -Avoid nephrotoxic agents if possible and renally dose medications  -Avoid drastic hemodynamic changes if possible        Thank you for your consult. I will follow-up with patient. Please contact us if you have any additional questions.    Tanya Dill DNP  Nephrology  [facility] - Cardiology Stepdown

## 2024-01-12 NOTE — ASSESSMENT & PLAN NOTE
Baseline creatinine 2-2.5 (longstanding DM with bilateral osteomyelitis, HTN)  Multiple comorbitites as well     On admission serum creatinine 7.5  ELIZABETH appears to be multifactorial, possible prolonged pre-renal state/ATN from hemodynamic changes from CHB vs progressive CKD.   Obstruction relieved by jackson placed 12/28/23 with 500 cc of purulent UOP. Supposedly follows with urology in outpatient setting. Is on tamsulosin.   Pyelonephritis with UA showing 3+ leuks with many bacteria. History of proteus in past.   Low effective circulating volume suggested by physical exam, evidence of acute liver injury in setting of HFpEF with severe aortic stenosis. Home medication included metolazone 1 mg bid     Dialysis consent obtained 12/28/23  Renal US with no hydronephrosis   UCPR: 2.05  Azeem 22  Microalbumin / creatinine ratio: 647.3     Plan/Recommendation    -In light of Scr improving, ok to d/c home with close nephrology follow up for long term dialysis planning and labs in 3 days  -Plan discussed with staff   -Strict ins and outs  -Avoid nephrotoxic agents if possible and renally dose medications  -Avoid drastic hemodynamic changes if possible

## 2024-01-12 NOTE — ASSESSMENT & PLAN NOTE
Patient has hypokalemia which is Acute and currently controlled. Most recent potassium levels reviewed-   Lab Results   Component Value Date    K 3.7 01/12/2024   . Will continue potassium replacement per protocol and recheck repeat levels after replacement completed.

## 2024-01-12 NOTE — ASSESSMENT & PLAN NOTE
- H:H stable, has required about 4u of pRBC during hospitalization  - multifactorial- reported h/o melena- would need EGD/colon, but has not been stable- will need outpatient f/u if patient does not decide to enroll in hospice;also with acute renal failure- also contributing to anemia  - slight decrease in Hg today- per nephro- will give epo  - monitor trend- no obvious source of blood loss at this time  - repeat H:H In the AM

## 2024-01-12 NOTE — ASSESSMENT & PLAN NOTE
- Patient with complete heart block, previous EKG with first degree AV block and LBBB  - Patient on metoprolol and amiodarone as an outpatient  - HR in the 30s on admission  - EP consulted, thought it could be 2/2 uremia/ELIZABETH/infection; held off on TVP initially  - Stepped down to  with plans to see how he improved with dialysis  - However went into torsades on 12/31  - TVP placed by IC; s/p Micra insertion with EP on 1/5

## 2024-01-12 NOTE — PLAN OF CARE
LINCOLN called José [facility] Mcbride and left another voicemail for admissions requesting call back.      Veena Lozoya LMSW  [facility]  [id number]

## 2024-01-12 NOTE — PLAN OF CARE
01/12/24 1222   Post-Acute Status   Post-Acute Authorization Placement   Post-Acute Placement Status Pending medical clearance/testing     LINCOLN informed by Dr Small that per nephrology pt does not need an outpatient dialysis chair at this time.  Referral sent for pt to return to [facility] when medically stable.  Per discussion with Dr Small she will update pt's niece of discharge plan.      UPDATE 4:08 PM  LINCOLN informed that pt's niece/POA Leigh is inquiring about moving pt to [facility].  LINCOLN called and and spoke with Leigh, who explained that while she does not have a problem with [facility], she would like for him to be closer to family.  LINCOLN advised Leigh to reach out to Case Management at [facility] about moving facilities since the hospital cannot hold up discharge while trying to get pt into a new prison placement, and per Dr Small pt should be ready to discharge tomorrow.  Leigh voiced understanding.      Veena Lozoya, VAHE  [facility]  [id number]

## 2024-01-13 PROBLEM — Z51.5 PALLIATIVE CARE ENCOUNTER: Status: RESOLVED | Noted: 2024-01-03 | Resolved: 2024-01-13

## 2024-01-13 PROBLEM — I82.C11 THROMBOSIS OF RIGHT INTERNAL JUGULAR VEIN: Status: ACTIVE | Noted: 2024-01-13

## 2024-01-13 LAB
ALBUMIN SERPL BCP-MCNC: 1.8 G/DL (ref 3.5–5.2)
ALP SERPL-CCNC: 88 U/L (ref 55–135)
ALT SERPL W/O P-5'-P-CCNC: 6 U/L (ref 10–44)
ANION GAP SERPL CALC-SCNC: 9 MMOL/L (ref 8–16)
AST SERPL-CCNC: 17 U/L (ref 10–40)
BASOPHILS # BLD AUTO: 0.13 K/UL (ref 0–0.2)
BASOPHILS NFR BLD: 1.4 % (ref 0–1.9)
BILIRUB SERPL-MCNC: 0.5 MG/DL (ref 0.1–1)
BUN SERPL-MCNC: 51 MG/DL (ref 8–23)
CALCIUM SERPL-MCNC: 8.4 MG/DL (ref 8.7–10.5)
CHLORIDE SERPL-SCNC: 108 MMOL/L (ref 95–110)
CO2 SERPL-SCNC: 23 MMOL/L (ref 23–29)
CREAT SERPL-MCNC: 4 MG/DL (ref 0.5–1.4)
DIFFERENTIAL METHOD BLD: ABNORMAL
EOSINOPHIL # BLD AUTO: 0.3 K/UL (ref 0–0.5)
EOSINOPHIL NFR BLD: 3.6 % (ref 0–8)
ERYTHROCYTE [DISTWIDTH] IN BLOOD BY AUTOMATED COUNT: 18.4 % (ref 11.5–14.5)
EST. GFR  (NO RACE VARIABLE): 15.1 ML/MIN/1.73 M^2
GLUCOSE SERPL-MCNC: 67 MG/DL (ref 70–110)
HCT VFR BLD AUTO: 22.5 % (ref 40–54)
HGB BLD-MCNC: 7.2 G/DL (ref 14–18)
IMM GRANULOCYTES # BLD AUTO: 0.03 K/UL (ref 0–0.04)
IMM GRANULOCYTES NFR BLD AUTO: 0.3 % (ref 0–0.5)
LYMPHOCYTES # BLD AUTO: 1 K/UL (ref 1–4.8)
LYMPHOCYTES NFR BLD: 11.1 % (ref 18–48)
MAGNESIUM SERPL-MCNC: 1.9 MG/DL (ref 1.6–2.6)
MCH RBC QN AUTO: 27 PG (ref 27–31)
MCHC RBC AUTO-ENTMCNC: 32 G/DL (ref 32–36)
MCV RBC AUTO: 84 FL (ref 82–98)
MONOCYTES # BLD AUTO: 1.1 K/UL (ref 0.3–1)
MONOCYTES NFR BLD: 11.5 % (ref 4–15)
NEUTROPHILS # BLD AUTO: 6.8 K/UL (ref 1.8–7.7)
NEUTROPHILS NFR BLD: 72.1 % (ref 38–73)
NRBC BLD-RTO: 0 /100 WBC
PHOSPHATE SERPL-MCNC: 3.9 MG/DL (ref 2.7–4.5)
PLATELET # BLD AUTO: 330 K/UL (ref 150–450)
PMV BLD AUTO: 9 FL (ref 9.2–12.9)
POCT GLUCOSE: 73 MG/DL (ref 70–110)
POCT GLUCOSE: 75 MG/DL (ref 70–110)
POTASSIUM SERPL-SCNC: 3.7 MMOL/L (ref 3.5–5.1)
PROT SERPL-MCNC: 6.1 G/DL (ref 6–8.4)
RBC # BLD AUTO: 2.67 M/UL (ref 4.6–6.2)
SODIUM SERPL-SCNC: 140 MMOL/L (ref 136–145)
WBC # BLD AUTO: 9.41 K/UL (ref 3.9–12.7)

## 2024-01-13 PROCEDURE — 84100 ASSAY OF PHOSPHORUS: CPT | Performed by: INTERNAL MEDICINE

## 2024-01-13 PROCEDURE — 80053 COMPREHEN METABOLIC PANEL: CPT | Performed by: INTERNAL MEDICINE

## 2024-01-13 PROCEDURE — 20600001 HC STEP DOWN PRIVATE ROOM

## 2024-01-13 PROCEDURE — 25000003 PHARM REV CODE 250: Performed by: INTERNAL MEDICINE

## 2024-01-13 PROCEDURE — 85025 COMPLETE CBC W/AUTO DIFF WBC: CPT | Performed by: INTERNAL MEDICINE

## 2024-01-13 PROCEDURE — 25000003 PHARM REV CODE 250: Performed by: HOSPITALIST

## 2024-01-13 PROCEDURE — 63600175 PHARM REV CODE 636 W HCPCS: Performed by: STUDENT IN AN ORGANIZED HEALTH CARE EDUCATION/TRAINING PROGRAM

## 2024-01-13 PROCEDURE — 25000003 PHARM REV CODE 250

## 2024-01-13 PROCEDURE — 27000207 HC ISOLATION

## 2024-01-13 PROCEDURE — 25000003 PHARM REV CODE 250: Performed by: STUDENT IN AN ORGANIZED HEALTH CARE EDUCATION/TRAINING PROGRAM

## 2024-01-13 PROCEDURE — 83735 ASSAY OF MAGNESIUM: CPT | Performed by: INTERNAL MEDICINE

## 2024-01-13 RX ADMIN — ERTAPENEM 1 G: 1 INJECTION INTRAMUSCULAR; INTRAVENOUS at 11:01

## 2024-01-13 RX ADMIN — ALLOPURINOL 50 MG: 300 TABLET ORAL at 09:01

## 2024-01-13 RX ADMIN — APIXABAN 10 MG: 5 TABLET, FILM COATED ORAL at 08:01

## 2024-01-13 RX ADMIN — ACETAMINOPHEN 1000 MG: 500 TABLET ORAL at 09:01

## 2024-01-13 RX ADMIN — FOLIC ACID 1 MG: 1 TABLET ORAL at 09:01

## 2024-01-13 RX ADMIN — APIXABAN 5 MG: 5 TABLET, FILM COATED ORAL at 09:01

## 2024-01-13 RX ADMIN — NIFEDIPINE 30 MG: 30 TABLET, FILM COATED, EXTENDED RELEASE ORAL at 09:01

## 2024-01-13 RX ADMIN — ACETAMINOPHEN 1000 MG: 500 TABLET ORAL at 01:01

## 2024-01-13 RX ADMIN — ATORVASTATIN CALCIUM 80 MG: 40 TABLET, FILM COATED ORAL at 09:01

## 2024-01-13 RX ADMIN — FAMOTIDINE 20 MG: 20 TABLET, FILM COATED ORAL at 09:01

## 2024-01-13 RX ADMIN — ERYTHROPOIETIN 10000 UNITS: 10000 INJECTION, SOLUTION INTRAVENOUS; SUBCUTANEOUS at 12:01

## 2024-01-13 NOTE — PLAN OF CARE
Problem: Infection  Goal: Absence of Infection Signs and Symptoms  Outcome: Ongoing, Progressing     Problem: Adult Inpatient Plan of Care  Goal: Plan of Care Review  Outcome: Ongoing, Progressing     Problem: Diabetes Comorbidity  Goal: Blood Glucose Level Within Targeted Range  Outcome: Ongoing, Progressing     Problem: Renal Function Impairment (Acute Kidney Injury/Impairment)  Goal: Effective Renal Function  Outcome: Ongoing, Progressing

## 2024-01-13 NOTE — PLAN OF CARE
Problem: Infection  Goal: Absence of Infection Signs and Symptoms  Outcome: Ongoing, Progressing     Problem: Adult Inpatient Plan of Care  Goal: Optimal Comfort and Wellbeing  Outcome: Ongoing, Progressing  Goal: Readiness for Transition of Care  Outcome: Ongoing, Progressing     Problem: Diabetes Comorbidity  Goal: Blood Glucose Level Within Targeted Range  Outcome: Ongoing, Progressing     Problem: Fluid and Electrolyte Imbalance (Acute Kidney Injury/Impairment)  Goal: Fluid and Electrolyte Balance  Outcome: Ongoing, Progressing     Problem: Oral Intake Inadequate (Acute Kidney Injury/Impairment)  Goal: Optimal Nutrition Intake  Outcome: Ongoing, Progressing

## 2024-01-14 LAB
ALBUMIN SERPL BCP-MCNC: 1.8 G/DL (ref 3.5–5.2)
ALP SERPL-CCNC: 86 U/L (ref 55–135)
ALT SERPL W/O P-5'-P-CCNC: 5 U/L (ref 10–44)
ANION GAP SERPL CALC-SCNC: 9 MMOL/L (ref 8–16)
AST SERPL-CCNC: 15 U/L (ref 10–40)
BASOPHILS # BLD AUTO: 0.11 K/UL (ref 0–0.2)
BASOPHILS NFR BLD: 1.2 % (ref 0–1.9)
BILIRUB SERPL-MCNC: 0.4 MG/DL (ref 0.1–1)
BUN SERPL-MCNC: 49 MG/DL (ref 8–23)
CALCIUM SERPL-MCNC: 8.7 MG/DL (ref 8.7–10.5)
CHLORIDE SERPL-SCNC: 107 MMOL/L (ref 95–110)
CO2 SERPL-SCNC: 22 MMOL/L (ref 23–29)
CREAT SERPL-MCNC: 4.2 MG/DL (ref 0.5–1.4)
DIFFERENTIAL METHOD BLD: ABNORMAL
EOSINOPHIL # BLD AUTO: 0.3 K/UL (ref 0–0.5)
EOSINOPHIL NFR BLD: 3.5 % (ref 0–8)
ERYTHROCYTE [DISTWIDTH] IN BLOOD BY AUTOMATED COUNT: 18.1 % (ref 11.5–14.5)
EST. GFR  (NO RACE VARIABLE): 14.2 ML/MIN/1.73 M^2
GLUCOSE SERPL-MCNC: 53 MG/DL (ref 70–110)
HCT VFR BLD AUTO: 23.1 % (ref 40–54)
HGB BLD-MCNC: 7.2 G/DL (ref 14–18)
IMM GRANULOCYTES # BLD AUTO: 0.06 K/UL (ref 0–0.04)
IMM GRANULOCYTES NFR BLD AUTO: 0.7 % (ref 0–0.5)
LYMPHOCYTES # BLD AUTO: 1.2 K/UL (ref 1–4.8)
LYMPHOCYTES NFR BLD: 12.8 % (ref 18–48)
MAGNESIUM SERPL-MCNC: 1.9 MG/DL (ref 1.6–2.6)
MCH RBC QN AUTO: 26.8 PG (ref 27–31)
MCHC RBC AUTO-ENTMCNC: 31.2 G/DL (ref 32–36)
MCV RBC AUTO: 86 FL (ref 82–98)
MONOCYTES # BLD AUTO: 1 K/UL (ref 0.3–1)
MONOCYTES NFR BLD: 11 % (ref 4–15)
NEUTROPHILS # BLD AUTO: 6.4 K/UL (ref 1.8–7.7)
NEUTROPHILS NFR BLD: 70.8 % (ref 38–73)
NRBC BLD-RTO: 0 /100 WBC
PHOSPHATE SERPL-MCNC: 4.1 MG/DL (ref 2.7–4.5)
PLATELET # BLD AUTO: 360 K/UL (ref 150–450)
PMV BLD AUTO: 9 FL (ref 9.2–12.9)
POCT GLUCOSE: 103 MG/DL (ref 70–110)
POCT GLUCOSE: 103 MG/DL (ref 70–110)
POCT GLUCOSE: 109 MG/DL (ref 70–110)
POCT GLUCOSE: 89 MG/DL (ref 70–110)
POTASSIUM SERPL-SCNC: 3.2 MMOL/L (ref 3.5–5.1)
PROT SERPL-MCNC: 6 G/DL (ref 6–8.4)
RBC # BLD AUTO: 2.69 M/UL (ref 4.6–6.2)
SODIUM SERPL-SCNC: 138 MMOL/L (ref 136–145)
WBC # BLD AUTO: 9.07 K/UL (ref 3.9–12.7)

## 2024-01-14 PROCEDURE — 93005 ELECTROCARDIOGRAM TRACING: CPT

## 2024-01-14 PROCEDURE — 80053 COMPREHEN METABOLIC PANEL: CPT | Performed by: INTERNAL MEDICINE

## 2024-01-14 PROCEDURE — 93010 ELECTROCARDIOGRAM REPORT: CPT | Mod: ,,, | Performed by: INTERNAL MEDICINE

## 2024-01-14 PROCEDURE — 85025 COMPLETE CBC W/AUTO DIFF WBC: CPT | Performed by: INTERNAL MEDICINE

## 2024-01-14 PROCEDURE — 25000003 PHARM REV CODE 250: Performed by: INTERNAL MEDICINE

## 2024-01-14 PROCEDURE — 63600175 PHARM REV CODE 636 W HCPCS: Performed by: STUDENT IN AN ORGANIZED HEALTH CARE EDUCATION/TRAINING PROGRAM

## 2024-01-14 PROCEDURE — 27000207 HC ISOLATION

## 2024-01-14 PROCEDURE — 83735 ASSAY OF MAGNESIUM: CPT | Performed by: INTERNAL MEDICINE

## 2024-01-14 PROCEDURE — 63600175 PHARM REV CODE 636 W HCPCS: Performed by: PHYSICIAN ASSISTANT

## 2024-01-14 PROCEDURE — 25000003 PHARM REV CODE 250: Performed by: HOSPITALIST

## 2024-01-14 PROCEDURE — 36415 COLL VENOUS BLD VENIPUNCTURE: CPT | Performed by: STUDENT IN AN ORGANIZED HEALTH CARE EDUCATION/TRAINING PROGRAM

## 2024-01-14 PROCEDURE — 25000003 PHARM REV CODE 250: Performed by: STUDENT IN AN ORGANIZED HEALTH CARE EDUCATION/TRAINING PROGRAM

## 2024-01-14 PROCEDURE — 84100 ASSAY OF PHOSPHORUS: CPT | Performed by: INTERNAL MEDICINE

## 2024-01-14 PROCEDURE — 87040 BLOOD CULTURE FOR BACTERIA: CPT | Mod: 59 | Performed by: STUDENT IN AN ORGANIZED HEALTH CARE EDUCATION/TRAINING PROGRAM

## 2024-01-14 PROCEDURE — 20600001 HC STEP DOWN PRIVATE ROOM

## 2024-01-14 PROCEDURE — 25000003 PHARM REV CODE 250

## 2024-01-14 RX ORDER — DIPHENHYDRAMINE HYDROCHLORIDE 50 MG/ML
25 INJECTION INTRAMUSCULAR; INTRAVENOUS ONCE
Status: COMPLETED | OUTPATIENT
Start: 2024-01-14 | End: 2024-01-14

## 2024-01-14 RX ORDER — POTASSIUM CHLORIDE 20 MEQ/1
20 TABLET, EXTENDED RELEASE ORAL
Status: COMPLETED | OUTPATIENT
Start: 2024-01-14 | End: 2024-01-14

## 2024-01-14 RX ORDER — MAGNESIUM SULFATE HEPTAHYDRATE 40 MG/ML
2 INJECTION, SOLUTION INTRAVENOUS ONCE
Status: COMPLETED | OUTPATIENT
Start: 2024-01-14 | End: 2024-01-14

## 2024-01-14 RX ORDER — MAGNESIUM SULFATE 1 G/100ML
1 INJECTION INTRAVENOUS ONCE
Status: DISCONTINUED | OUTPATIENT
Start: 2024-01-14 | End: 2024-01-14

## 2024-01-14 RX ADMIN — POTASSIUM CHLORIDE 20 MEQ: 1500 TABLET, EXTENDED RELEASE ORAL at 12:01

## 2024-01-14 RX ADMIN — MAGNESIUM SULFATE HEPTAHYDRATE 2 G: 40 INJECTION, SOLUTION INTRAVENOUS at 10:01

## 2024-01-14 RX ADMIN — POTASSIUM CHLORIDE 20 MEQ: 1500 TABLET, EXTENDED RELEASE ORAL at 10:01

## 2024-01-14 RX ADMIN — FAMOTIDINE 20 MG: 20 TABLET, FILM COATED ORAL at 10:01

## 2024-01-14 RX ADMIN — ERTAPENEM 1 G: 1 INJECTION INTRAMUSCULAR; INTRAVENOUS at 11:01

## 2024-01-14 RX ADMIN — APIXABAN 10 MG: 5 TABLET, FILM COATED ORAL at 08:01

## 2024-01-14 RX ADMIN — DIPHENHYDRAMINE HYDROCHLORIDE 25 MG: 50 INJECTION, SOLUTION INTRAMUSCULAR; INTRAVENOUS at 08:01

## 2024-01-14 RX ADMIN — ACETAMINOPHEN 1000 MG: 500 TABLET ORAL at 03:01

## 2024-01-14 RX ADMIN — NIFEDIPINE 30 MG: 30 TABLET, FILM COATED, EXTENDED RELEASE ORAL at 10:01

## 2024-01-14 RX ADMIN — ATORVASTATIN CALCIUM 80 MG: 40 TABLET, FILM COATED ORAL at 10:01

## 2024-01-14 RX ADMIN — LIDOCAINE 5% 1 PATCH: 700 PATCH TOPICAL at 06:01

## 2024-01-14 RX ADMIN — FOLIC ACID 1 MG: 1 TABLET ORAL at 10:01

## 2024-01-14 RX ADMIN — APIXABAN 10 MG: 5 TABLET, FILM COATED ORAL at 10:01

## 2024-01-14 RX ADMIN — ACETAMINOPHEN 1000 MG: 500 TABLET ORAL at 05:01

## 2024-01-14 RX ADMIN — ACETAMINOPHEN 1000 MG: 500 TABLET ORAL at 09:01

## 2024-01-14 RX ADMIN — ALLOPURINOL 50 MG: 300 TABLET ORAL at 10:01

## 2024-01-14 NOTE — ASSESSMENT & PLAN NOTE
Patient's FSGs are controlled on current medication regimen.  Last A1c reviewed-   Lab Results   Component Value Date    HGBA1C 5.5 09/20/2023     Most recent fingerstick glucose reviewed-   Recent Labs   Lab 01/12/24 2014 01/13/24  1236   POCTGLUCOSE 103 73       Current correctional scale  Low  Maintain anti-hyperglycemic dose as follows-   Antihyperglycemics (From admission, onward)    Start     Stop Route Frequency Ordered    12/28/23 1818  insulin aspart U-100 pen 0-5 Units         -- SubQ Before meals & nightly PRN 12/28/23 1719        Hold Oral hypoglycemics while patient is in the hospital.

## 2024-01-14 NOTE — PROGRESS NOTES
[facility] - Cardiology [facility] Medicine  Progress Note    Patient Name: Chato Bowie  MRN: [id number]  Patient Class: IP- Inpatient   Admission Date: 12/28/2023  Length of Stay: 16 days  Attending Physician: Gabbi Small MD  Primary Care Provider: Irlanda Valenzuela -        Subjective:     Principal Problem:Acute renal failure superimposed on stage 3b chronic kidney disease        HPI:  73yoM w/PMHx of T2DM, HTN, HLD, CHF, CAD, HLD, CKD 3b, alcohol use disorder, and multiple thyroid nodules presented from [facility] with RLQ pain, fatigue, bradycardia ~30's, weakness, and lab abnormalities including Cr 7.1 and . 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  On interview patient lethargic and disoriented, history obtained via chart review.    In ED pt AF, bradycardic ~40's, RR in the mid 20's, BP ~110/50, satting 100% on 2L NC. Labs significant for Hb 7.2, ANC 8.3, Na 132, Cl 87, HCO3 23, AG 22, , Cr 7.5, phos 8.3, Albumin 1.9, AST 74, , Lipase 146. UA with 2+ protein, 2+ occult blood, 3+ leukocytes, >100WBC and many bacteria. EKG showed wide QRS with LBBB, vent rate 45, QT/QTc 652/563. CXR showed mild retraction of R-sided PICC into mid SVC. CT A&P in progress. In ED jackson placed, started on CTX, given fluids, nephrology consulted. Admitting to [facility] for further management.     Overview/Hospital Course:  Admitted for CHB which EP thought to be 2/2 uremia/ELIZABETH and infection. Has not required pacing and is HDS. Recommended holding AV iker agents. BUN and sCr newly elevated, seen by nephrology who started dialysis for clearance. Tolerated dialysis with plans to further dialyze. ID recommended erta x 7 days for ESBL EColi UTI. Continued daptomycin for his BL heel OM.  C/b delirium for which CT head which was unremarkable. Stepped  down to  but came back to CCU after he went into torsades on 12/31. CPR was initiated and pt regained pulse and consciousness; bradycardic post event with HR ~50. Electrolytes replaced. TVP placed in CCU; spoke with SID who confirmed a DNR status but that she would want a pacemaker placed. EP consulted for PPM insertion. Had another episode of torsades (~ 2 mins) that became CHB and then was paced to NSR. TVP placed. Palliative care consulted for GOC/ACP. Pt more alerted and oriented, states he would like to proceed with the procedure, Micra pacemaker placed on 1/5. Bleeding from groin site with BPs in 80/50s that recovered with 1 L NS bolus. Stepped down to hospital medicine 1/5. Received 2 unit prbc's 1/5-1/6 with stable H/H since. Nephrology feels that patient will need long-term dialysis so Interventional Nephro planning line placement 1/11.  Heparin gtt in lieu of Eliquis prior to procedure.     Palliative care with ongoing GOC discussion for TDC vs. Home w/hospice. On further review with nephrology and patient's overall renal function, he was having good UOP and stabilization of renal function. Ok to hold off on TDC placement and outpatient HD arrangement. Stopped heparin gtt 01/12 and resumed home eliquis. Trialysis catheter removed 01/13- noted to have some purulence- small amount- at suture site. US of Rt neck- Rt IJ clot appreciated. Eliquis increased to 10mg BID.     Interval History: No complaints per patient this am- trialysis cath removed. No nausea, vomiting, fevers, chills, night sweats, abd pain. Good UOP.     Objective:     Vital Signs (Most Recent):  Temp: 96.4 °F (35.8 °C) (01/13/24 1238)  Pulse: 65 (01/13/24 1528)  Resp: 20 (01/13/24 1238)  BP: (!) 152/68 (01/13/24 1238)  SpO2: 100 % (01/13/24 1238) Vital Signs (24h Range):  Temp:  [96.4 °F (35.8 °C)-98.3 °F (36.8 °C)] 96.4 °F (35.8 °C)  Pulse:  [61-67] 65  Resp:  [18-20] 20  SpO2:  [97 %-100 %] 100 %  BP: (130-152)/(61-68) 152/68     Weight:  "80.7 kg (178 lb)  Body mass index is 24.83 kg/m².    Intake/Output Summary (Last 24 hours) at 1/13/2024 1848  Last data filed at 1/13/2024 1400  Gross per 24 hour   Intake 372 ml   Output 300 ml   Net 72 ml      Physical Exam  Gen: in NAD, appears stated age, appears chronically ill   Neuro: awake, oriented to self alone, not oriented to location, time or situation, motor, sensory, and strength grossly intact BL  HEENT: NTNC, EOMI, PERRL, MMM, muddy sclera- on removal of Rt IJ trialysis line- some purulence appreciated at suture site- no fluctuance appreciated   CVS: RRR, sys ejection murmur, no rubs or gallops; S1/S2 auscultated with no S3 or S4; capillary refill < 2 sec  Resp: lungs CTAB, no w/r/r; no belabored breathing or accessory muscle use appreciated   Abd: BS+ in all 4 quadrants; NTND, soft to palpation; no organomegaly appreciated   Extrem: pulses full, equal, and regular over all 4 extremities; no UE edema, BL LE edema    Significant Labs: All pertinent labs within the past 24 hours have been reviewed.  CBC:   Recent Labs   Lab 01/12/24  0412 01/13/24  0359   WBC 11.45 9.41   HGB 7.0* 7.2*   HCT 22.2* 22.5*    330     CMP:   Recent Labs   Lab 01/12/24  0412 01/13/24  0359    140   K 3.7 3.7    108   CO2 23 23   GLU 79 67*   BUN 56* 51*   CREATININE 4.6* 4.0*   CALCIUM 8.4* 8.4*   PROT 6.1 6.1   ALBUMIN 1.8* 1.8*   BILITOT 0.5 0.5   ALKPHOS 82 88   AST 19 17   ALT 5* 6*   ANIONGAP 9 9     Cardiac Markers: No results for input(s): "CKMB", "MYOGLOBIN", "BNP", "TROPISTAT" in the last 48 hours.    Significant Imaging: I have reviewed all pertinent imaging results/findings within the past 24 hours.    US of Rt Neck:  FINDINGS:  No fluid collection in the soft tissues of the right-side of neck at site of former central line.  Right-sided PICC line is in place.  Echogenic material noted within the right internal jugular vein, which is partially compressible.     Impression:     1. No fluid " collection.  2. Nonocclusive thrombus within the right internal jugular vein.    Assessment/Plan:      * Acute renal failure superimposed on stage 3b chronic kidney disease  - Admitted with ELIZABETH on CKD  - Initial  with sCr 7.5; improving with dialysis  - Nephrology consulted, started on HD for clearance  - Still making some urine  - Per nephro- ok to hold on TDC- no need to arrange outpatient HD at this time, will need repeat renal function panel 3 days after discharge and close nephro follow-up- patient and POA updated with regards to plan         Thrombosis of right internal jugular vein  - US of neck obtained w/some purulence appreciated from suture of trialysis line- no fluid collection  - found to have Rt IJ thrombus  - Increase eliquis to 10mg BID for 7d, then plan to decrease to 5mg BID       UTI (urinary tract infection)  - s/p 7d of ertapenem for ESBL E coli  - Repeat UA 01/12 concerning for recurrent UTI vs. Contaminant- begin erta with previous urine cultures- awaiting final urine culture    Irritant contact dermatitis due to fecal, urinary or dual incontinence  - wound care following       Torsades de pointes  - h/o torsades    Complete heart block  - Patient with complete heart block, previous EKG with first degree AV block and LBBB  - Patient on metoprolol and amiodarone as an outpatient  - HR in the 30s on admission  - EP consulted, thought it could be 2/2 uremia/ELIZABETH/infection; held off on TVP initially  - Stepped down to  with plans to see how he improved with dialysis  - However went into torsades on 12/31  - TVP placed by IC; s/p Micra insertion with EP on 1/5    Osteomyelitis of foot  - admitted 11/27 for BL wounds- OM of BL calcaneus and 5th metatarsal- found to be MRSA +iv  - s/p bone biopsy and debridement via podiatriy  - on 6wks of IV dapto- EOT 01/10/24    Advanced care planning/counseling discussion  - palliative following      Acute encephalopathy  - improved, likely 2/2 uremic  encephalopathy and UTI on admission  - some delirium today- possibility of hospital acquired?   - Delirium precautions  - UA w/possible recurrent UTI- repeat Ucx sent- begin erta in the meantime w/previous Ucx results    Bradycardia  - see CHB    Severe malnutrition  Nutrition consulted. Most recent weight and BMI pending    Measurements:  Wt Readings from Last 1 Encounters:   12/29/23 80.7 kg (178 lb)   Body mass index is 24.83 kg/m².    Patient will been screened and assessed by RD.    Malnutrition Type:  Context: chronic illness  Level: severe    Malnutrition Characteristic Summary:  Weight Loss (Malnutrition): other (see comments) (15% x 3 months)  Energy Intake (Malnutrition): less than 75% for greater than or equal to 3 months  Subcutaneous Fat (Malnutrition): mild depletion  Muscle Mass (Malnutrition): moderate depletion    Interventions/Recommendations (treatment strategy):  1)      Paroxysmal atrial flutter  - stop heparin gtt  - increase eliquis for treatment of VTE- 10mg BID for 7d and then 5mg BID    Severe aortic valve stenosis  - tte/ 12/29  Summary         Left Ventricle: The left ventricle is normal in size. Ventricular mass is normal. Normal wall thickness. There is concentric remodeling. Septal motion is consistent with bundle branch block. There is normal systolic function with a visually estimated ejection fraction of 55 - 60%. Grade II diastolic dysfunction.    Right Ventricle: Normal right ventricular cavity size. Systolic function is normal.    Left Atrium: Left atrium is severely dilated.    Aortic Valve: There is moderate aortic valve sclerosis. Moderately restricted motion. There is severe stenosis. Aortic valve area by VTI is 0.86 cm². Aortic valve peak velocity is 4.12 m/s. Mean gradient is 43 mmHg. The dimensionless index is 0.22. There is mild to moderate aortic regurgitation.    Mitral Valve: There is bileaflet sclerosis. There is mild annular dilation present.    Tricuspid Valve:  There is mild to moderate regurgitation.    Pulmonary Artery: The estimated pulmonary artery systolic pressure is 72 mmHg.    IVC/SVC: Intermediate venous pressure at 8 mmHg.    - outpatient f/u with interventional cardiology      Hypokalemia  Patient has hypokalemia which is Acute and currently controlled. Most recent potassium levels reviewed-   Lab Results   Component Value Date    K 3.7 01/13/2024   . Will continue potassium replacement per protocol and recheck repeat levels after replacement completed.     GERD (gastroesophageal reflux disease)  - continue PPI      Anemia  - H:H stable, has required about 4u of pRBC during hospitalization  - multifactorial- reported h/o melena- would need EGD/colon, but has not been stable- will need outpatient f/u if patient does not decide to enroll in hospice;also with acute renal failure- also contributing to anemia  - H:H stable- giving dose of epo yesterday- never administered 01/12    Type 2 diabetes mellitus with kidney complication, with long-term current use of insulin  Patient's FSGs are controlled on current medication regimen.  Last A1c reviewed-   Lab Results   Component Value Date    HGBA1C 5.5 09/20/2023     Most recent fingerstick glucose reviewed-   Recent Labs   Lab 01/12/24 2014 01/13/24  1236   POCTGLUCOSE 103 73       Current correctional scale  Low  Maintain anti-hyperglycemic dose as follows-   Antihyperglycemics (From admission, onward)      Start     Stop Route Frequency Ordered    12/28/23 1818  insulin aspart U-100 pen 0-5 Units         -- SubQ Before meals & nightly PRN 12/28/23 1719          Hold Oral hypoglycemics while patient is in the hospital.    Dyslipidemia  - Cont home statin      Epigastric abdominal pain  - resolved        VTE Risk Mitigation (From admission, onward)           Ordered     apixaban tablet 10 mg  2 times daily         01/13/24 1846     heparin (porcine) 2,000 Units in sodium chloride 0.9% 1,000 mL  As needed (PRN)          01/05/24 1025     heparin infusion 1,000 units/500 ml in 0.9% NaCl (on sterile field)  As needed (PRN)         01/05/24 1024     heparin (porcine) injection 1,000 Units  As needed (PRN)         12/30/23 1056     IP VTE HIGH RISK PATIENT  Once         12/28/23 1719     Place sequential compression device  Until discontinued         12/28/23 1719                    Discharge Planning   ELY: 1/14/2024     Code Status: DNR   Is the patient medically ready for discharge?: No    Reason for patient still in hospital (select all that apply): Patient trending condition  Discharge Plan A: New Nursing Home placement - care home care facility (for stretcher dialysis)              Gabbi Small MD  Department of Hospital Medicine   [facility] - Cardiology Stepdown

## 2024-01-14 NOTE — ASSESSMENT & PLAN NOTE
- improved, likely 2/2 uremic encephalopathy and UTI on admission  - some delirium today- possibility of hospital acquired?   - Delirium precautions  - UA w/possible recurrent UTI- repeat Ucx sent- begin erta in the meantime w/previous Ucx results

## 2024-01-14 NOTE — PLAN OF CARE
Problem: Infection  Goal: Absence of Infection Signs and Symptoms  Outcome: Ongoing, Progressing     Problem: Diabetes Comorbidity  Goal: Blood Glucose Level Within Targeted Range  Outcome: Ongoing, Progressing     Problem: Fluid and Electrolyte Imbalance (Acute Kidney Injury/Impairment)  Goal: Fluid and Electrolyte Balance  Outcome: Ongoing, Progressing     Problem: Oral Intake Inadequate (Acute Kidney Injury/Impairment)  Goal: Optimal Nutrition Intake  Outcome: Ongoing, Progressing

## 2024-01-14 NOTE — ASSESSMENT & PLAN NOTE
Patient has hypokalemia which is Acute and currently controlled. Most recent potassium levels reviewed-   Lab Results   Component Value Date    K 3.7 01/13/2024   . Will continue potassium replacement per protocol and recheck repeat levels after replacement completed.

## 2024-01-14 NOTE — SUBJECTIVE & OBJECTIVE
Interval History: Some confusion this am. Waxing and waning. No nausea, vomiting, fevers, chills, night sweats, abd pain. Good UOP. UOP may not be totally accurate- patient with wet sheets - condom cath came loose.     Objective:     Vital Signs (Most Recent):  Temp: 97.9 °F (36.6 °C) (01/14/24 1310)  Pulse: 94 (01/14/24 1518)  Resp: 18 (01/14/24 1310)  BP: 128/65 (01/14/24 1310)  SpO2: 99 % (01/14/24 1310) Vital Signs (24h Range):  Temp:  [97 °F (36.1 °C)-98.1 °F (36.7 °C)] 97.9 °F (36.6 °C)  Pulse:  [62-94] 94  Resp:  [16-18] 18  SpO2:  [99 %] 99 %  BP: (122-134)/(60-65) 128/65     Weight: 80.7 kg (178 lb)  Body mass index is 24.83 kg/m².    Intake/Output Summary (Last 24 hours) at 1/14/2024 1528  Last data filed at 1/14/2024 1328  Gross per 24 hour   Intake 480 ml   Output 250 ml   Net 230 ml      Physical Exam  Gen: in NAD, appears stated age, appears chronically ill   Neuro: awake, oriented to self alone, not oriented to location, time or situation, motor, sensory, and strength grossly intact BL  HEENT: NTNC, EOMI, PERRL, MMM, muddy sclera  CVS: RRR, sys ejection murmur, no rubs or gallops; S1/S2 auscultated with no S3 or S4; capillary refill < 2 sec  Resp: lungs CTAB, no w/r/r; no belabored breathing or accessory muscle use appreciated   Abd: BS+ in all 4 quadrants; NTND, soft to palpation; no organomegaly appreciated   Extrem: pulses full, equal, and regular over all 4 extremities; no UE edema, BL LE edema    Significant Labs: All pertinent labs within the past 24 hours have been reviewed.  CBC:   Recent Labs   Lab 01/13/24  0359 01/14/24  0335   WBC 9.41 9.07   HGB 7.2* 7.2*   HCT 22.5* 23.1*    360     CMP:   Recent Labs   Lab 01/13/24  0359 01/14/24  0335    138   K 3.7 3.2*    107   CO2 23 22*   GLU 67* 53*   BUN 51* 49*   CREATININE 4.0* 4.2*   CALCIUM 8.4* 8.7   PROT 6.1 6.0   ALBUMIN 1.8* 1.8*   BILITOT 0.5 0.4   ALKPHOS 88 86   AST 17 15   ALT 6* 5*   ANIONGAP 9 9     Cardiac  "Markers: No results for input(s): "CKMB", "MYOGLOBIN", "BNP", "TROPISTAT" in the last 48 hours.    Significant Imaging: I have reviewed all pertinent imaging results/findings within the past 24 hours.    US of Rt Neck:  FINDINGS:  No fluid collection in the soft tissues of the right-side of neck at site of former central line.  Right-sided PICC line is in place.  Echogenic material noted within the right internal jugular vein, which is partially compressible.     Impression:     1. No fluid collection.  2. Nonocclusive thrombus within the right internal jugular vein.  "

## 2024-01-14 NOTE — ASSESSMENT & PLAN NOTE
- H:H stable, has required about 4u of pRBC during hospitalization  - multifactorial- reported h/o melena- would need EGD/colon, but has not been stable- will need outpatient f/u if patient does not decide to enroll in hospice;also with acute renal failure- also contributing to anemia  - H:H stable- epo administered 01/13

## 2024-01-14 NOTE — SUBJECTIVE & OBJECTIVE
Interval History: No complaints per patient this am- trialysis cath removed. No nausea, vomiting, fevers, chills, night sweats, abd pain. Good UOP.     Objective:     Vital Signs (Most Recent):  Temp: 96.4 °F (35.8 °C) (01/13/24 1238)  Pulse: 65 (01/13/24 1528)  Resp: 20 (01/13/24 1238)  BP: (!) 152/68 (01/13/24 1238)  SpO2: 100 % (01/13/24 1238) Vital Signs (24h Range):  Temp:  [96.4 °F (35.8 °C)-98.3 °F (36.8 °C)] 96.4 °F (35.8 °C)  Pulse:  [61-67] 65  Resp:  [18-20] 20  SpO2:  [97 %-100 %] 100 %  BP: (130-152)/(61-68) 152/68     Weight: 80.7 kg (178 lb)  Body mass index is 24.83 kg/m².    Intake/Output Summary (Last 24 hours) at 1/13/2024 1848  Last data filed at 1/13/2024 1400  Gross per 24 hour   Intake 372 ml   Output 300 ml   Net 72 ml      Physical Exam  Gen: in NAD, appears stated age, appears chronically ill   Neuro: awake, oriented to self alone, not oriented to location, time or situation, motor, sensory, and strength grossly intact BL  HEENT: NTNC, EOMI, PERRL, MMM, muddy sclera- on removal of Rt IJ trialysis line- some purulence appreciated at suture site- no fluctuance appreciated   CVS: RRR, sys ejection murmur, no rubs or gallops; S1/S2 auscultated with no S3 or S4; capillary refill < 2 sec  Resp: lungs CTAB, no w/r/r; no belabored breathing or accessory muscle use appreciated   Abd: BS+ in all 4 quadrants; NTND, soft to palpation; no organomegaly appreciated   Extrem: pulses full, equal, and regular over all 4 extremities; no UE edema, BL LE edema    Significant Labs: All pertinent labs within the past 24 hours have been reviewed.  CBC:   Recent Labs   Lab 01/12/24  0412 01/13/24  0359   WBC 11.45 9.41   HGB 7.0* 7.2*   HCT 22.2* 22.5*    330     CMP:   Recent Labs   Lab 01/12/24  0412 01/13/24  0359    140   K 3.7 3.7    108   CO2 23 23   GLU 79 67*   BUN 56* 51*   CREATININE 4.6* 4.0*   CALCIUM 8.4* 8.4*   PROT 6.1 6.1   ALBUMIN 1.8* 1.8*   BILITOT 0.5 0.5   ALKPHOS 82 88   AST  "19 17   ALT 5* 6*   ANIONGAP 9 9     Cardiac Markers: No results for input(s): "CKMB", "MYOGLOBIN", "BNP", "TROPISTAT" in the last 48 hours.    Significant Imaging: I have reviewed all pertinent imaging results/findings within the past 24 hours.    US of Rt Neck:  FINDINGS:  No fluid collection in the soft tissues of the right-side of neck at site of former central line.  Right-sided PICC line is in place.  Echogenic material noted within the right internal jugular vein, which is partially compressible.     Impression:     1. No fluid collection.  2. Nonocclusive thrombus within the right internal jugular vein.  "

## 2024-01-14 NOTE — ASSESSMENT & PLAN NOTE
Patient has hypokalemia which is Acute and currently controlled. Most recent potassium levels reviewed-   Lab Results   Component Value Date    K 3.2 (L) 01/14/2024   . Will continue potassium replacement per protocol and recheck repeat levels after replacement completed.

## 2024-01-14 NOTE — PROGRESS NOTES
Billy Sepulveda - Cardiology [facility] Medicine  Progress Note    Patient Name: Chato Bowie  MRN: [id number]  Patient Class: IP- Inpatient   Admission Date: 12/28/2023  Length of Stay: 17 days  Attending Physician: Gabbi Small MD  Primary Care Provider: Irlanda Valenzuela -        Subjective:     Principal Problem:Acute renal failure superimposed on stage 3b chronic kidney disease        HPI:  73yoM w/PMHx of T2DM, HTN, HLD, CHF, CAD, HLD, CKD 3b, alcohol use disorder, and multiple thyroid nodules presented from [facility] with RLQ pain, fatigue, bradycardia ~30's, weakness, and lab abnormalities including Cr 7.1 and . 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  On interview patient lethargic and disoriented, history obtained via chart review.    In ED pt AF, bradycardic ~40's, RR in the mid 20's, BP ~110/50, satting 100% on 2L NC. Labs significant for Hb 7.2, ANC 8.3, Na 132, Cl 87, HCO3 23, AG 22, , Cr 7.5, phos 8.3, Albumin 1.9, AST 74, , Lipase 146. UA with 2+ protein, 2+ occult blood, 3+ leukocytes, >100WBC and many bacteria. EKG showed wide QRS with LBBB, vent rate 45, QT/QTc 652/563. CXR showed mild retraction of R-sided PICC into mid SVC. CT A&P in progress. In ED jackson placed, started on CTX, given fluids, nephrology consulted. Admitting to [facility] for further management.     Overview/Hospital Course:  Admitted for CHB which EP thought to be 2/2 uremia/ELIZABETH and infection. Has not required pacing and is HDS. Recommended holding AV iker agents. BUN and sCr newly elevated, seen by nephrology who started dialysis for clearance. Tolerated dialysis with plans to further dialyze. ID recommended erta x 7 days for ESBL EColi UTI. Continued daptomycin for his BL heel OM.  C/b delirium for which CT head which was unremarkable. Stepped  down to  but came back to CCU after he went into torsades on 12/31. CPR was initiated and pt regained pulse and consciousness; bradycardic post event with HR ~50. Electrolytes replaced. TVP placed in CCU; spoke with SID who confirmed a DNR status but that she would want a pacemaker placed. EP consulted for PPM insertion. Had another episode of torsades (~ 2 mins) that became CHB and then was paced to NSR. TVP placed. Palliative care consulted for GOC/ACP. Pt more alerted and oriented, states he would like to proceed with the procedure, Micra pacemaker placed on 1/5. Bleeding from groin site with BPs in 80/50s that recovered with 1 L NS bolus. Stepped down to hospital medicine 1/5. Received 2 unit prbc's 1/5-1/6 with stable H/H since. Nephrology feels that patient will need long-term dialysis so Interventional Nephro planning line placement 1/11.  Heparin gtt in lieu of Eliquis prior to procedure.     Palliative care with ongoing GOC discussion for TDC vs. Home w/hospice. On further review with nephrology and patient's overall renal function, he was having good UOP and stabilization of renal function. Ok to hold off on TDC placement and outpatient HD arrangement. Stopped heparin gtt 01/12 and resumed home eliquis. Trialysis catheter removed 01/13- noted to have some purulence- small amount- at suture site. US of Rt neck- Rt IJ clot appreciated. Eliquis increased to 10mg BID.     Interval History: Some confusion this am. Waxing and waning. No nausea, vomiting, fevers, chills, night sweats, abd pain. Good UOP. UOP may not be totally accurate- patient with wet sheets - condom cath came loose.     Objective:     Vital Signs (Most Recent):  Temp: 97.9 °F (36.6 °C) (01/14/24 1310)  Pulse: 94 (01/14/24 1518)  Resp: 18 (01/14/24 1310)  BP: 128/65 (01/14/24 1310)  SpO2: 99 % (01/14/24 1310) Vital Signs (24h Range):  Temp:  [97 °F (36.1 °C)-98.1 °F (36.7 °C)] 97.9 °F (36.6 °C)  Pulse:  [62-94] 94  Resp:  [16-18] 18  SpO2:   "[99 %] 99 %  BP: (122-134)/(60-65) 128/65     Weight: 80.7 kg (178 lb)  Body mass index is 24.83 kg/m².    Intake/Output Summary (Last 24 hours) at 1/14/2024 1528  Last data filed at 1/14/2024 1328  Gross per 24 hour   Intake 480 ml   Output 250 ml   Net 230 ml      Physical Exam  Gen: in NAD, appears stated age, appears chronically ill   Neuro: awake, oriented to self alone, not oriented to location, time or situation, motor, sensory, and strength grossly intact BL  HEENT: NTNC, EOMI, PERRL, MMM, muddy sclera  CVS: RRR, sys ejection murmur, no rubs or gallops; S1/S2 auscultated with no S3 or S4; capillary refill < 2 sec  Resp: lungs CTAB, no w/r/r; no belabored breathing or accessory muscle use appreciated   Abd: BS+ in all 4 quadrants; NTND, soft to palpation; no organomegaly appreciated   Extrem: pulses full, equal, and regular over all 4 extremities; no UE edema, BL LE edema    Significant Labs: All pertinent labs within the past 24 hours have been reviewed.  CBC:   Recent Labs   Lab 01/13/24  0359 01/14/24  0335   WBC 9.41 9.07   HGB 7.2* 7.2*   HCT 22.5* 23.1*    360     CMP:   Recent Labs   Lab 01/13/24  0359 01/14/24  0335    138   K 3.7 3.2*    107   CO2 23 22*   GLU 67* 53*   BUN 51* 49*   CREATININE 4.0* 4.2*   CALCIUM 8.4* 8.7   PROT 6.1 6.0   ALBUMIN 1.8* 1.8*   BILITOT 0.5 0.4   ALKPHOS 88 86   AST 17 15   ALT 6* 5*   ANIONGAP 9 9     Cardiac Markers: No results for input(s): "CKMB", "MYOGLOBIN", "BNP", "TROPISTAT" in the last 48 hours.    Significant Imaging: I have reviewed all pertinent imaging results/findings within the past 24 hours.    US of Rt Neck:  FINDINGS:  No fluid collection in the soft tissues of the right-side of neck at site of former central line.  Right-sided PICC line is in place.  Echogenic material noted within the right internal jugular vein, which is partially compressible.     Impression:     1. No fluid collection.  2. Nonocclusive thrombus within the right " internal jugular vein.    Assessment/Plan:      * Acute renal failure superimposed on stage 3b chronic kidney disease  - Admitted with ELIZABETH on CKD  - Initial  with sCr 7.5; improving with dialysis  - Nephrology consulted, started on HD for clearance  - Still making some urine  - Per nephro- no need to arrange outpatient HD at this time, will need repeat renal function panel 3 days after discharge and close nephro follow-up- patient and POA updated with regards to plan         Thrombosis of right internal jugular vein  - US of neck obtained w/some purulence appreciated from suture of trialysis line- no fluid collection  - found to have Rt IJ thrombus  - Continue eliquis 10mg BID for 7d, then plan to decrease to 5mg BID       UTI (urinary tract infection)  - s/p 7d of ertapenem for ESBL E coli  - Repeat UA 01/12 concerning for recurrent UTI vs. Contaminant- continue erta with previous urine cultures- awaiting final urine culture    Irritant contact dermatitis due to fecal, urinary or dual incontinence  - wound care following       Torsades de pointes  - h/o torsades    Complete heart block  - Patient with complete heart block, previous EKG with first degree AV block and LBBB  - Patient on metoprolol and amiodarone as an outpatient  - HR in the 30s on admission  - EP consulted, thought it could be 2/2 uremia/ELIZABETH/infection; held off on TVP initially  - Stepped down to  with plans to see how he improved with dialysis  - However went into torsades on 12/31  - TVP placed by IC; s/p Micra insertion with EP on 1/5    Osteomyelitis of foot  - admitted 11/27 for BL wounds- OM of BL calcaneus and 5th metatarsal- found to be MRSA +iv  - s/p bone biopsy and debridement via podiatriy  - on 6wks of IV dapto- EOT 01/10/24    Advanced care planning/counseling discussion  - palliative following      Acute encephalopathy  - improved, likely 2/2 uremic encephalopathy and UTI on admission  - some delirium today- possibility of  hospital acquired?   - Delirium precautions  - UA w/possible recurrent UTI- repeat Ucx pending, on erta- blood cultures sent 01/13, but not obtained until 01/14- will make sure no growth of blood cultures or urine cultures prior to DC    Bradycardia  - see CHB    Severe malnutrition  Nutrition consulted. Most recent weight and BMI pending    Measurements:  Wt Readings from Last 1 Encounters:   12/29/23 80.7 kg (178 lb)   Body mass index is 24.83 kg/m².    Patient will been screened and assessed by RD.    Malnutrition Type:  Context: chronic illness  Level: severe    Malnutrition Characteristic Summary:  Weight Loss (Malnutrition): other (see comments) (15% x 3 months)  Energy Intake (Malnutrition): less than 75% for greater than or equal to 3 months  Subcutaneous Fat (Malnutrition): mild depletion  Muscle Mass (Malnutrition): moderate depletion    Interventions/Recommendations (treatment strategy):  1)      Paroxysmal atrial flutter  - stop heparin gtt  - continue eliquis for treatment of VTE- 10mg BID for 7d and then 5mg BID    Severe aortic valve stenosis  - tte/ 12/29  Summary         Left Ventricle: The left ventricle is normal in size. Ventricular mass is normal. Normal wall thickness. There is concentric remodeling. Septal motion is consistent with bundle branch block. There is normal systolic function with a visually estimated ejection fraction of 55 - 60%. Grade II diastolic dysfunction.    Right Ventricle: Normal right ventricular cavity size. Systolic function is normal.    Left Atrium: Left atrium is severely dilated.    Aortic Valve: There is moderate aortic valve sclerosis. Moderately restricted motion. There is severe stenosis. Aortic valve area by VTI is 0.86 cm². Aortic valve peak velocity is 4.12 m/s. Mean gradient is 43 mmHg. The dimensionless index is 0.22. There is mild to moderate aortic regurgitation.    Mitral Valve: There is bileaflet sclerosis. There is mild annular dilation present.     Tricuspid Valve: There is mild to moderate regurgitation.    Pulmonary Artery: The estimated pulmonary artery systolic pressure is 72 mmHg.    IVC/SVC: Intermediate venous pressure at 8 mmHg.    - outpatient f/u with interventional cardiology      Hypokalemia  Patient has hypokalemia which is Acute and currently controlled. Most recent potassium levels reviewed-   Lab Results   Component Value Date    K 3.2 (L) 01/14/2024   . Will continue potassium replacement per protocol and recheck repeat levels after replacement completed.     GERD (gastroesophageal reflux disease)  - continue PPI      Anemia  - H:H stable, has required about 4u of pRBC during hospitalization  - multifactorial- reported h/o melena- would need EGD/colon, but has not been stable- will need outpatient f/u if patient does not decide to enroll in hospice;also with acute renal failure- also contributing to anemia  - H:H stable- epo administered 01/13    Type 2 diabetes mellitus with kidney complication, with long-term current use of insulin  Patient's FSGs are controlled on current medication regimen.  Last A1c reviewed-   Lab Results   Component Value Date    HGBA1C 5.5 09/20/2023     Most recent fingerstick glucose reviewed-   Recent Labs   Lab 01/13/24  1900 01/14/24  1306 01/14/24  1326   POCTGLUCOSE 75 103 103       Current correctional scale  Low  Maintain anti-hyperglycemic dose as follows-   Antihyperglycemics (From admission, onward)      Start     Stop Route Frequency Ordered    12/28/23 1818  insulin aspart U-100 pen 0-5 Units         -- SubQ Before meals & nightly PRN 12/28/23 1719          Hold Oral hypoglycemics while patient is in the hospital.    Dyslipidemia  - Cont home statin      Epigastric abdominal pain  - resolved        VTE Risk Mitigation (From admission, onward)           Ordered     apixaban tablet 10 mg  2 times daily         01/13/24 1846     heparin (porcine) 2,000 Units in sodium chloride 0.9% 1,000 mL  As needed (PRN)          01/05/24 1025     heparin infusion 1,000 units/500 ml in 0.9% NaCl (on sterile field)  As needed (PRN)         01/05/24 1024     heparin (porcine) injection 1,000 Units  As needed (PRN)         12/30/23 1056     IP VTE HIGH RISK PATIENT  Once         12/28/23 1719     Place sequential compression device  Until discontinued         12/28/23 1719                    Discharge Planning   ELY: 1/15/2024     Code Status: DNR   Is the patient medically ready for discharge?: No    Reason for patient still in hospital (select all that apply): Patient trending condition  Discharge Plan A: New Nursing Home placement - senior care care facility (for stretcher dialysis)                Gabbi Small MD  Department of Hospital Medicine   [facility] - Cardiology Stepdown

## 2024-01-14 NOTE — ASSESSMENT & PLAN NOTE
- US of neck obtained w/some purulence appreciated from suture of trialysis line- no fluid collection  - found to have Rt IJ thrombus  - Continue eliquis 10mg BID for 7d, then plan to decrease to 5mg BID

## 2024-01-14 NOTE — PLAN OF CARE
Problem: Infection  Goal: Absence of Infection Signs and Symptoms  Outcome: Ongoing, Progressing     Problem: Diabetes Comorbidity  Goal: Blood Glucose Level Within Targeted Range  Outcome: Ongoing, Progressing     Problem: Impaired Wound Healing  Goal: Optimal Wound Healing  Outcome: Ongoing, Progressing

## 2024-01-14 NOTE — ASSESSMENT & PLAN NOTE
- improved, likely 2/2 uremic encephalopathy and UTI on admission  - some delirium today- possibility of hospital acquired?   - Delirium precautions  - UA w/possible recurrent UTI- repeat Ucx pending, on erta- blood cultures sent 01/13, but not obtained until 01/14- will make sure no growth of blood cultures or urine cultures prior to DC

## 2024-01-14 NOTE — ASSESSMENT & PLAN NOTE
Patient's FSGs are controlled on current medication regimen.  Last A1c reviewed-   Lab Results   Component Value Date    HGBA1C 5.5 09/20/2023     Most recent fingerstick glucose reviewed-   Recent Labs   Lab 01/13/24  1900 01/14/24  1306 01/14/24  1326   POCTGLUCOSE 75 103 103       Current correctional scale  Low  Maintain anti-hyperglycemic dose as follows-   Antihyperglycemics (From admission, onward)    Start     Stop Route Frequency Ordered    12/28/23 1818  insulin aspart U-100 pen 0-5 Units         -- SubQ Before meals & nightly PRN 12/28/23 1719        Hold Oral hypoglycemics while patient is in the hospital.

## 2024-01-14 NOTE — NURSING
Nurses Note -- 4 Eyes      01/13/24   7:00 PM      Skin assessed during: Q Shift Change      [] No Altered Skin Integrity Present    []Prevention Measures Documented      [x] Yes- Altered Skin Integrity Present or Discovered   [] LDA Added if Not in Epic (Describe Wound)   [] New Altered Skin Integrity was Present on Admit and Documented in LDA   [] Wound Image Taken    Wound Care Consulted? Yes    Attending Nurse:  Pat Rodríguez RN    Second RN/Staff Member:  Malcolm Angeles RN

## 2024-01-14 NOTE — ASSESSMENT & PLAN NOTE
- Admitted with ELIZABETH on CKD  - Initial  with sCr 7.5; improving with dialysis  - Nephrology consulted, started on HD for clearance  - Still making some urine  - Per nephro- no need to arrange outpatient HD at this time, will need repeat renal function panel 3 days after discharge and close nephro follow-up- patient and POA updated with regards to plan

## 2024-01-14 NOTE — ASSESSMENT & PLAN NOTE
- H:H stable, has required about 4u of pRBC during hospitalization  - multifactorial- reported h/o melena- would need EGD/colon, but has not been stable- will need outpatient f/u if patient does not decide to enroll in hospice;also with acute renal failure- also contributing to anemia  - H:H stable- giving dose of epo yesterday- never administered 01/12

## 2024-01-14 NOTE — ASSESSMENT & PLAN NOTE
- US of neck obtained w/some purulence appreciated from suture of trialysis line- no fluid collection  - found to have Rt IJ thrombus  - Increase eliquis to 10mg BID for 7d, then plan to decrease to 5mg BID

## 2024-01-14 NOTE — ASSESSMENT & PLAN NOTE
- Admitted with ELIZABETH on CKD  - Initial  with sCr 7.5; improving with dialysis  - Nephrology consulted, started on HD for clearance  - Still making some urine  - Per nephro- ok to hold on TDC- no need to arrange outpatient HD at this time, will need repeat renal function panel 3 days after discharge and close nephro follow-up- patient and POA updated with regards to plan

## 2024-01-14 NOTE — ASSESSMENT & PLAN NOTE
- s/p 7d of ertapenem for ESBL E coli  - Repeat UA 01/12 concerning for recurrent UTI vs. Contaminant- begin erta with previous urine cultures- awaiting final urine culture

## 2024-01-14 NOTE — ASSESSMENT & PLAN NOTE
- s/p 7d of ertapenem for ESBL E coli  - Repeat UA 01/12 concerning for recurrent UTI vs. Contaminant- continue erta with previous urine cultures- awaiting final urine culture

## 2024-01-15 LAB
ALBUMIN SERPL BCP-MCNC: 2.1 G/DL (ref 3.5–5.2)
ALP SERPL-CCNC: 105 U/L (ref 55–135)
ALT SERPL W/O P-5'-P-CCNC: 6 U/L (ref 10–44)
ANION GAP SERPL CALC-SCNC: 13 MMOL/L (ref 8–16)
AST SERPL-CCNC: 19 U/L (ref 10–40)
BASOPHILS # BLD AUTO: 0.06 K/UL (ref 0–0.2)
BASOPHILS NFR BLD: 0.5 % (ref 0–1.9)
BILIRUB SERPL-MCNC: 0.4 MG/DL (ref 0.1–1)
BUN SERPL-MCNC: 47 MG/DL (ref 8–23)
CALCIUM SERPL-MCNC: 9.1 MG/DL (ref 8.7–10.5)
CHLORIDE SERPL-SCNC: 108 MMOL/L (ref 95–110)
CO2 SERPL-SCNC: 20 MMOL/L (ref 23–29)
CREAT SERPL-MCNC: 3.7 MG/DL (ref 0.5–1.4)
DIFFERENTIAL METHOD BLD: ABNORMAL
EOSINOPHIL # BLD AUTO: 0 K/UL (ref 0–0.5)
EOSINOPHIL NFR BLD: 0.2 % (ref 0–8)
ERYTHROCYTE [DISTWIDTH] IN BLOOD BY AUTOMATED COUNT: 18.2 % (ref 11.5–14.5)
EST. GFR  (NO RACE VARIABLE): 16.5 ML/MIN/1.73 M^2
GLUCOSE SERPL-MCNC: 69 MG/DL (ref 70–110)
HCT VFR BLD AUTO: 28.1 % (ref 40–54)
HGB BLD-MCNC: 8.7 G/DL (ref 14–18)
IMM GRANULOCYTES # BLD AUTO: 0.08 K/UL (ref 0–0.04)
IMM GRANULOCYTES NFR BLD AUTO: 0.6 % (ref 0–0.5)
LYMPHOCYTES # BLD AUTO: 0.5 K/UL (ref 1–4.8)
LYMPHOCYTES NFR BLD: 3.6 % (ref 18–48)
MAGNESIUM SERPL-MCNC: 2.3 MG/DL (ref 1.6–2.6)
MCH RBC QN AUTO: 26.2 PG (ref 27–31)
MCHC RBC AUTO-ENTMCNC: 31 G/DL (ref 32–36)
MCV RBC AUTO: 85 FL (ref 82–98)
MONOCYTES # BLD AUTO: 0.9 K/UL (ref 0.3–1)
MONOCYTES NFR BLD: 7.3 % (ref 4–15)
NEUTROPHILS # BLD AUTO: 10.9 K/UL (ref 1.8–7.7)
NEUTROPHILS NFR BLD: 87.8 % (ref 38–73)
NRBC BLD-RTO: 0 /100 WBC
PHOSPHATE SERPL-MCNC: 4.1 MG/DL (ref 2.7–4.5)
PLATELET # BLD AUTO: 421 K/UL (ref 150–450)
PMV BLD AUTO: 8.7 FL (ref 9.2–12.9)
POCT GLUCOSE: 79 MG/DL (ref 70–110)
POCT GLUCOSE: 84 MG/DL (ref 70–110)
POCT GLUCOSE: 84 MG/DL (ref 70–110)
POTASSIUM SERPL-SCNC: 3.6 MMOL/L (ref 3.5–5.1)
PROT SERPL-MCNC: 7.1 G/DL (ref 6–8.4)
RBC # BLD AUTO: 3.32 M/UL (ref 4.6–6.2)
SODIUM SERPL-SCNC: 141 MMOL/L (ref 136–145)
WBC # BLD AUTO: 12.44 K/UL (ref 3.9–12.7)

## 2024-01-15 PROCEDURE — 63600175 PHARM REV CODE 636 W HCPCS: Performed by: STUDENT IN AN ORGANIZED HEALTH CARE EDUCATION/TRAINING PROGRAM

## 2024-01-15 PROCEDURE — 25000003 PHARM REV CODE 250: Performed by: STUDENT IN AN ORGANIZED HEALTH CARE EDUCATION/TRAINING PROGRAM

## 2024-01-15 PROCEDURE — 63600175 PHARM REV CODE 636 W HCPCS

## 2024-01-15 PROCEDURE — 36415 COLL VENOUS BLD VENIPUNCTURE: CPT | Performed by: INTERNAL MEDICINE

## 2024-01-15 PROCEDURE — 25000003 PHARM REV CODE 250: Performed by: INTERNAL MEDICINE

## 2024-01-15 PROCEDURE — 25000003 PHARM REV CODE 250: Performed by: HOSPITALIST

## 2024-01-15 PROCEDURE — 27000207 HC ISOLATION

## 2024-01-15 PROCEDURE — 83735 ASSAY OF MAGNESIUM: CPT | Performed by: INTERNAL MEDICINE

## 2024-01-15 PROCEDURE — 25000003 PHARM REV CODE 250

## 2024-01-15 PROCEDURE — 80053 COMPREHEN METABOLIC PANEL: CPT | Performed by: INTERNAL MEDICINE

## 2024-01-15 PROCEDURE — 85025 COMPLETE CBC W/AUTO DIFF WBC: CPT | Performed by: INTERNAL MEDICINE

## 2024-01-15 PROCEDURE — 84100 ASSAY OF PHOSPHORUS: CPT | Performed by: INTERNAL MEDICINE

## 2024-01-15 PROCEDURE — 20600001 HC STEP DOWN PRIVATE ROOM

## 2024-01-15 RX ORDER — PYRIDOXINE HYDROCHLORIDE 100 MG/ML
25 INJECTION, SOLUTION INTRAMUSCULAR; INTRAVENOUS ONCE
Status: COMPLETED | OUTPATIENT
Start: 2024-01-15 | End: 2024-01-15

## 2024-01-15 RX ORDER — POTASSIUM CHLORIDE 20 MEQ/1
40 TABLET, EXTENDED RELEASE ORAL ONCE
Status: DISCONTINUED | OUTPATIENT
Start: 2024-01-15 | End: 2024-01-15

## 2024-01-15 RX ADMIN — POTASSIUM BICARBONATE 40 MEQ: 391 TABLET, EFFERVESCENT ORAL at 09:01

## 2024-01-15 RX ADMIN — ALLOPURINOL 50 MG: 300 TABLET ORAL at 09:01

## 2024-01-15 RX ADMIN — FAMOTIDINE 20 MG: 20 TABLET, FILM COATED ORAL at 08:01

## 2024-01-15 RX ADMIN — NIFEDIPINE 30 MG: 30 TABLET, FILM COATED, EXTENDED RELEASE ORAL at 09:01

## 2024-01-15 RX ADMIN — FOLIC ACID 1 MG: 1 TABLET ORAL at 08:01

## 2024-01-15 RX ADMIN — ATORVASTATIN CALCIUM 80 MG: 40 TABLET, FILM COATED ORAL at 09:01

## 2024-01-15 RX ADMIN — PYRIDOXINE HYDROCHLORIDE 25 MG: 100 INJECTION, SOLUTION INTRAMUSCULAR; INTRAVENOUS at 02:01

## 2024-01-15 RX ADMIN — APIXABAN 10 MG: 5 TABLET, FILM COATED ORAL at 09:01

## 2024-01-15 RX ADMIN — ERTAPENEM 1 G: 1 INJECTION INTRAMUSCULAR; INTRAVENOUS at 10:01

## 2024-01-15 NOTE — ASSESSMENT & PLAN NOTE
Patient's FSGs are controlled on current medication regimen.  Last A1c reviewed-   Lab Results   Component Value Date    HGBA1C 5.5 09/20/2023     Most recent fingerstick glucose reviewed-   Recent Labs   Lab 01/14/24  1540 01/14/24  2027 01/15/24  0727 01/15/24  1126   POCTGLUCOSE 89 109 79 84       Current correctional scale  Low  Maintain anti-hyperglycemic dose as follows-   Antihyperglycemics (From admission, onward)    Start     Stop Route Frequency Ordered    12/28/23 1818  insulin aspart U-100 pen 0-5 Units         -- SubQ Before meals & nightly PRN 12/28/23 1719        Hold Oral hypoglycemics while patient is in the hospital.

## 2024-01-15 NOTE — PROGRESS NOTES
[facility] - Cardiology [facility] Medicine  Progress Note    Patient Name: Chato Bowie  MRN: [id number]  Patient Class: IP- Inpatient   Admission Date: 12/28/2023  Length of Stay: 18 days  Attending Physician: Gabbi Small MD  Primary Care Provider: Irlanda Valenzuela -        Subjective:     Principal Problem:Acute renal failure superimposed on stage 3b chronic kidney disease        HPI:  73yoM w/PMHx of T2DM, HTN, HLD, CHF, CAD, HLD, CKD 3b, alcohol use disorder, and multiple thyroid nodules presented from [facility] with RLQ pain, fatigue, bradycardia ~30's, weakness, and lab abnormalities including Cr 7.1 and . 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  On interview patient lethargic and disoriented, history obtained via chart review.    In ED pt AF, bradycardic ~40's, RR in the mid 20's, BP ~110/50, satting 100% on 2L NC. Labs significant for Hb 7.2, ANC 8.3, Na 132, Cl 87, HCO3 23, AG 22, , Cr 7.5, phos 8.3, Albumin 1.9, AST 74, , Lipase 146. UA with 2+ protein, 2+ occult blood, 3+ leukocytes, >100WBC and many bacteria. EKG showed wide QRS with LBBB, vent rate 45, QT/QTc 652/563. CXR showed mild retraction of R-sided PICC into mid SVC. CT A&P in progress. In ED jackson placed, started on CTX, given fluids, nephrology consulted. Admitting to [facility] for further management.     Overview/Hospital Course:  Admitted for CHB which EP thought to be 2/2 uremia/ELIZABETH and infection. Has not required pacing and is HDS. Recommended holding AV iker agents. BUN and sCr newly elevated, seen by nephrology who started dialysis for clearance. Tolerated dialysis with plans to further dialyze. ID recommended erta x 7 days for ESBL EColi UTI. Continued daptomycin for his BL heel OM.  C/b delirium for which CT head which was unremarkable. Stepped  down to  but came back to CCU after he went into torsades on 12/31. CPR was initiated and pt regained pulse and consciousness; bradycardic post event with HR ~50. Electrolytes replaced. TVP placed in CCU; spoke with SID who confirmed a DNR status but that she would want a pacemaker placed. EP consulted for PPM insertion. Had another episode of torsades (~ 2 mins) that became CHB and then was paced to NSR. TVP placed. Palliative care consulted for GOC/ACP. Pt more alerted and oriented, states he would like to proceed with the procedure, Micra pacemaker placed on 1/5. Bleeding from groin site with BPs in 80/50s that recovered with 1 L NS bolus. Stepped down to hospital medicine 1/5. Received 2 unit prbc's 1/5-1/6 with stable H/H since. Nephrology feels that patient will need long-term dialysis so Interventional Nephro planning line placement 1/11.  Heparin gtt in lieu of Eliquis prior to procedure.     Palliative care with ongoing GOC discussion for TDC vs. Home w/hospice. On further review with nephrology and patient's overall renal function, he was having good UOP and stabilization of renal function. Ok to hold off on TDC placement and outpatient HD arrangement. Stopped heparin gtt 01/12 and resumed home eliquis. Trialysis catheter removed 01/13- noted to have some purulence- small amount- at suture site. US of Rt neck- Rt IJ clot appreciated. Eliquis increased to 10mg BID. Repeat Ucx +iv for proteus mirabilis ESBL- planning for 10d of erta.     Interval History: No complaints per patient this am.     Objective:     Vital Signs (Most Recent):  Temp: 99.2 °F (37.3 °C) (01/15/24 1126)  Pulse: 84 (01/15/24 1200)  Resp: 18 (01/15/24 1126)  BP: 126/63 (01/15/24 1126)  SpO2: 97 % (01/15/24 1126) Vital Signs (24h Range):  Temp:  [97.7 °F (36.5 °C)-100.1 °F (37.8 °C)] 99.2 °F (37.3 °C)  Pulse:  [] 84  Resp:  [18] 18  SpO2:  [97 %-99 %] 97 %  BP: (122-143)/(60-66) 126/63     Weight: 80.7 kg (178 lb)  Body mass index  "is 24.83 kg/m².    Intake/Output Summary (Last 24 hours) at 1/15/2024 1332  Last data filed at 1/14/2024 1733  Gross per 24 hour   Intake 120 ml   Output 300 ml   Net -180 ml      Physical Exam  Gen: in NAD, appears stated age, appears chronically ill   Neuro: awake, oriented to self alone, not oriented to location, time or situation, motor, sensory, and strength grossly intact BL  HEENT: NTNC, EOMI, PERRL, MMM, muddy sclera  CVS: RRR, sys ejection murmur, no rubs or gallops; S1/S2 auscultated with no S3 or S4; capillary refill < 2 sec  Resp: lungs CTAB, no w/r/r; no belabored breathing or accessory muscle use appreciated   Abd: BS+ in all 4 quadrants; NTND, soft to palpation; no organomegaly appreciated   Extrem: pulses full, equal, and regular over all 4 extremities; no UE edema, BL LE edema    Significant Labs: All pertinent labs within the past 24 hours have been reviewed.  CBC:   Recent Labs   Lab 01/14/24  0335 01/15/24  0436   WBC 9.07 12.44   HGB 7.2* 8.7*   HCT 23.1* 28.1*    421     CMP:   Recent Labs   Lab 01/14/24  0335 01/15/24  0436    141   K 3.2* 3.6    108   CO2 22* 20*   GLU 53* 69*   BUN 49* 47*   CREATININE 4.2* 3.7*   CALCIUM 8.7 9.1   PROT 6.0 7.1   ALBUMIN 1.8* 2.1*   BILITOT 0.4 0.4   ALKPHOS 86 105   AST 15 19   ALT 5* 6*   ANIONGAP 9 13     Cardiac Markers: No results for input(s): "CKMB", "MYOGLOBIN", "BNP", "TROPISTAT" in the last 48 hours.    Significant Imaging: I have reviewed all pertinent imaging results/findings within the past 24 hours.    US of Rt Neck:  FINDINGS:  No fluid collection in the soft tissues of the right-side of neck at site of former central line.  Right-sided PICC line is in place.  Echogenic material noted within the right internal jugular vein, which is partially compressible.     Impression:     1. No fluid collection.  2. Nonocclusive thrombus within the right internal jugular vein.    Assessment/Plan:      * Acute renal failure superimposed " on stage 3b chronic kidney disease  - Admitted with ELIZABETH on CKD  - Initial  with sCr 7.5; improving with dialysis  - Nephrology consulted, started on HD for clearance  - Still making some urine  - Per nephro- no need to arrange outpatient HD at this time, will need repeat renal function panel 3 days after discharge and close nephro follow-up- patient and POA updated with regards to plan         Thrombosis of right internal jugular vein  - US of neck obtained w/some purulence appreciated from suture of trialysis line- no fluid collection  - found to have Rt IJ thrombus  - Continue eliquis 10mg BID for 7d, then plan to decrease to 5mg BID       UTI (urinary tract infection)  - s/p 7d of ertapenem for ESBL E coli  - Repeat UA 01/12 concerning for recurrent UTI - Ucx w/ESBL proteus mirabilis >100,000- can plan for 10d course of IV erta    Irritant contact dermatitis due to fecal, urinary or dual incontinence  - wound care following       Torsades de pointes  - h/o torsades    Complete heart block  - Patient with complete heart block, previous EKG with first degree AV block and LBBB  - Patient on metoprolol and amiodarone as an outpatient  - HR in the 30s on admission  - EP consulted, thought it could be 2/2 uremia/ELIZABETH/infection; held off on TVP initially  - Stepped down to  with plans to see how he improved with dialysis  - However went into torsades on 12/31  - TVP placed by IC; s/p Micra insertion with EP on 1/5    Osteomyelitis of foot  - admitted 11/27 for BL wounds- OM of BL calcaneus and 5th metatarsal- found to be MRSA +iv  - s/p bone biopsy and debridement via podiatriy  - on 6wks of IV dapto- EOT 01/10/24    Advanced care planning/counseling discussion  - palliative following      Acute encephalopathy  - improved, likely 2/2 uremic encephalopathy and UTI on admission  - some delirium today- possibility of hospital acquired?   - Delirium precautions  - Proteus mirabilis UTI- on IV erta   - Bcx 01/14-  NGTD     Bradycardia  - see CHB    Severe malnutrition  Nutrition consulted. Most recent weight and BMI pending    Measurements:  Wt Readings from Last 1 Encounters:   12/29/23 80.7 kg (178 lb)   Body mass index is 24.83 kg/m².    Patient will been screened and assessed by RD.    Malnutrition Type:  Context: chronic illness  Level: severe    Malnutrition Characteristic Summary:  Weight Loss (Malnutrition): other (see comments) (15% x 3 months)  Energy Intake (Malnutrition): less than 75% for greater than or equal to 3 months  Subcutaneous Fat (Malnutrition): mild depletion  Muscle Mass (Malnutrition): moderate depletion    Interventions/Recommendations (treatment strategy):  1)      Paroxysmal atrial flutter  - stop heparin gtt  - continue eliquis for treatment of VTE- 10mg BID for 7d and then 5mg BID    Severe aortic valve stenosis  - tte/ 12/29  Summary         Left Ventricle: The left ventricle is normal in size. Ventricular mass is normal. Normal wall thickness. There is concentric remodeling. Septal motion is consistent with bundle branch block. There is normal systolic function with a visually estimated ejection fraction of 55 - 60%. Grade II diastolic dysfunction.    Right Ventricle: Normal right ventricular cavity size. Systolic function is normal.    Left Atrium: Left atrium is severely dilated.    Aortic Valve: There is moderate aortic valve sclerosis. Moderately restricted motion. There is severe stenosis. Aortic valve area by VTI is 0.86 cm². Aortic valve peak velocity is 4.12 m/s. Mean gradient is 43 mmHg. The dimensionless index is 0.22. There is mild to moderate aortic regurgitation.    Mitral Valve: There is bileaflet sclerosis. There is mild annular dilation present.    Tricuspid Valve: There is mild to moderate regurgitation.    Pulmonary Artery: The estimated pulmonary artery systolic pressure is 72 mmHg.    IVC/SVC: Intermediate venous pressure at 8 mmHg.    - outpatient f/u with interventional  cardiology      Hypokalemia  Patient has hypokalemia which is Acute and currently controlled. Most recent potassium levels reviewed-   Lab Results   Component Value Date    K 3.6 01/15/2024   . Will continue potassium replacement per protocol and recheck repeat levels after replacement completed.     GERD (gastroesophageal reflux disease)  - continue PPI      Anemia  - H:H stable, has required about 4u of pRBC during hospitalization  - multifactorial- reported h/o melena- would need EGD/colon, but has not been stable- will need outpatient f/u if patient does not decide to enroll in hospice;also with acute renal failure- also contributing to anemia  - H:H stable- epo administered 01/13    Type 2 diabetes mellitus with kidney complication, with long-term current use of insulin  Patient's FSGs are controlled on current medication regimen.  Last A1c reviewed-   Lab Results   Component Value Date    HGBA1C 5.5 09/20/2023     Most recent fingerstick glucose reviewed-   Recent Labs   Lab 01/14/24  1540 01/14/24 2027 01/15/24  0727 01/15/24  1126   POCTGLUCOSE 89 109 79 84       Current correctional scale  Low  Maintain anti-hyperglycemic dose as follows-   Antihyperglycemics (From admission, onward)      Start     Stop Route Frequency Ordered    12/28/23 1818  insulin aspart U-100 pen 0-5 Units         -- SubQ Before meals & nightly PRN 12/28/23 1719          Hold Oral hypoglycemics while patient is in the hospital.    Dyslipidemia  - Cont home statin      Epigastric abdominal pain  - resolved        VTE Risk Mitigation (From admission, onward)           Ordered     apixaban tablet 10 mg  2 times daily         01/13/24 1846     heparin (porcine) 2,000 Units in sodium chloride 0.9% 1,000 mL  As needed (PRN)         01/05/24 1025     heparin infusion 1,000 units/500 ml in 0.9% NaCl (on sterile field)  As needed (PRN)         01/05/24 1024     heparin (porcine) injection 1,000 Units  As needed (PRN)         12/30/23 1056      IP VTE HIGH RISK PATIENT  Once         12/28/23 1719     Place sequential compression device  Until discontinued         12/28/23 1719                    Discharge Planning   ELY: 1/15/2024     Code Status: DNR   Is the patient medically ready for discharge?: No    Reason for patient still in hospital (select all that apply): Patient trending condition  Discharge Plan A: New Nursing Home placement - half-way care facility (for stretcher dialysis)                    Gabbi Small MD  Department of Hospital Medicine   [facility] - Cardiology Stepdown

## 2024-01-15 NOTE — PROGRESS NOTES
Pharmacist Renal Dose Adjustment Note    Chato Bowie is a 73 y.o. male being treated with the medication ertapenem.    Patient Data:    Vital Signs (Most Recent):  Temp: 99.2 °F (37.3 °C) (01/15/24 1126)  Pulse: 86 (01/15/24 1127)  Resp: 18 (01/15/24 1126)  BP: 126/63 (01/15/24 1126)  SpO2: 97 % (01/15/24 1126) Vital Signs (72h Range):  Temp:  [96.4 °F (35.8 °C)-100.1 °F (37.8 °C)]   Pulse:  []   Resp:  [16-20]   BP: (122-152)/(58-68)   SpO2:  [97 %-100 %]      Recent Labs   Lab 01/13/24  0359 01/14/24  0335 01/15/24  0436   CREATININE 4.0* 4.2* 3.7*     Serum creatinine: 3.7 mg/dL (H) 01/15/24 0436  Estimated creatinine clearance: 18.9 mL/min (A)    Medication:Ertapenem 1 gram IV every 24 hrs will be changed to 500 mg every 24 hrs in setting of patient's CrCl < 30 mL/min.    Pharmacist's Name: Keshav Garcia  Pharmacist's Extension: [phone]

## 2024-01-15 NOTE — PLAN OF CARE
Problem: Infection  Goal: Absence of Infection Signs and Symptoms  Outcome: Ongoing, Progressing     Problem: Adult Inpatient Plan of Care  Goal: Plan of Care Review  Outcome: Ongoing, Progressing  Goal: Patient-Specific Goal (Individualized)  Outcome: Ongoing, Progressing  Goal: Absence of Hospital-Acquired Illness or Injury  Outcome: Ongoing, Progressing  Goal: Optimal Comfort and Wellbeing  Outcome: Ongoing, Progressing  Goal: Readiness for Transition of Care  Outcome: Ongoing, Progressing     Problem: Device-Related Complication Risk (Hemodialysis)  Goal: Safe, Effective Therapy Delivery  Outcome: Ongoing, Progressing     Problem: Hemodynamic Instability (Hemodialysis)  Goal: Effective Tissue Perfusion  Outcome: Ongoing, Progressing     Problem: Infection (Hemodialysis)  Goal: Absence of Infection Signs and Symptoms  Outcome: Ongoing, Progressing     Problem: Diabetes Comorbidity  Goal: Blood Glucose Level Within Targeted Range  Outcome: Ongoing, Progressing     Problem: Fluid and Electrolyte Imbalance (Acute Kidney Injury/Impairment)  Goal: Fluid and Electrolyte Balance  Outcome: Ongoing, Progressing     Problem: Oral Intake Inadequate (Acute Kidney Injury/Impairment)  Goal: Optimal Nutrition Intake  Outcome: Ongoing, Progressing     Problem: Renal Function Impairment (Acute Kidney Injury/Impairment)  Goal: Effective Renal Function  Outcome: Ongoing, Progressing     Problem: Impaired Wound Healing  Goal: Optimal Wound Healing  Outcome: Ongoing, Progressing     Problem: Skin Injury Risk Increased  Goal: Skin Health and Integrity  Outcome: Ongoing, Progressing     Problem: Coping Ineffective  Goal: Effective Coping  Outcome: Ongoing, Progressing     Problem: Fall Injury Risk  Goal: Absence of Fall and Fall-Related Injury  Outcome: Ongoing, Progressing   Plan of care discussed with patient. Patient is free of fall/trauma/injury. Denies CP, SOB, or pain/discomfort. All questions addressed. Will continue to  monitor

## 2024-01-15 NOTE — NURSING
When giving AM medications, pt threw up pills. Pt stated he could swallow them crushed in applesauce. Will pull again from pyxis and try again.

## 2024-01-15 NOTE — ASSESSMENT & PLAN NOTE
- s/p 7d of ertapenem for ESBL E coli  - Repeat UA 01/12 concerning for recurrent UTI - Ucx w/ESBL proteus mirabilis >100,000- can plan for 10d course of IV erta

## 2024-01-15 NOTE — ASSESSMENT & PLAN NOTE
Patient has hypokalemia which is Acute and currently controlled. Most recent potassium levels reviewed-   Lab Results   Component Value Date    K 3.6 01/15/2024   . Will continue potassium replacement per protocol and recheck repeat levels after replacement completed.

## 2024-01-15 NOTE — ASSESSMENT & PLAN NOTE
- improved, likely 2/2 uremic encephalopathy and UTI on admission  - some delirium today- possibility of hospital acquired?   - Delirium precautions  - Proteus mirabilis UTI- on IV erta   - Bcx 01/14- NGTD

## 2024-01-15 NOTE — SUBJECTIVE & OBJECTIVE
"Interval History: No complaints per patient this am.     Objective:     Vital Signs (Most Recent):  Temp: 99.2 °F (37.3 °C) (01/15/24 1126)  Pulse: 84 (01/15/24 1200)  Resp: 18 (01/15/24 1126)  BP: 126/63 (01/15/24 1126)  SpO2: 97 % (01/15/24 1126) Vital Signs (24h Range):  Temp:  [97.7 °F (36.5 °C)-100.1 °F (37.8 °C)] 99.2 °F (37.3 °C)  Pulse:  [] 84  Resp:  [18] 18  SpO2:  [97 %-99 %] 97 %  BP: (122-143)/(60-66) 126/63     Weight: 80.7 kg (178 lb)  Body mass index is 24.83 kg/m².    Intake/Output Summary (Last 24 hours) at 1/15/2024 1332  Last data filed at 1/14/2024 1733  Gross per 24 hour   Intake 120 ml   Output 300 ml   Net -180 ml      Physical Exam  Gen: in NAD, appears stated age, appears chronically ill   Neuro: awake, oriented to self alone, not oriented to location, time or situation, motor, sensory, and strength grossly intact BL  HEENT: NTNC, EOMI, PERRL, MMM, muddy sclera  CVS: RRR, sys ejection murmur, no rubs or gallops; S1/S2 auscultated with no S3 or S4; capillary refill < 2 sec  Resp: lungs CTAB, no w/r/r; no belabored breathing or accessory muscle use appreciated   Abd: BS+ in all 4 quadrants; NTND, soft to palpation; no organomegaly appreciated   Extrem: pulses full, equal, and regular over all 4 extremities; no UE edema, BL LE edema    Significant Labs: All pertinent labs within the past 24 hours have been reviewed.  CBC:   Recent Labs   Lab 01/14/24  0335 01/15/24  0436   WBC 9.07 12.44   HGB 7.2* 8.7*   HCT 23.1* 28.1*    421     CMP:   Recent Labs   Lab 01/14/24  0335 01/15/24  0436    141   K 3.2* 3.6    108   CO2 22* 20*   GLU 53* 69*   BUN 49* 47*   CREATININE 4.2* 3.7*   CALCIUM 8.7 9.1   PROT 6.0 7.1   ALBUMIN 1.8* 2.1*   BILITOT 0.4 0.4   ALKPHOS 86 105   AST 15 19   ALT 5* 6*   ANIONGAP 9 13     Cardiac Markers: No results for input(s): "CKMB", "MYOGLOBIN", "BNP", "TROPISTAT" in the last 48 hours.    Significant Imaging: I have reviewed all pertinent imaging " results/findings within the past 24 hours.    US of Rt Neck:  FINDINGS:  No fluid collection in the soft tissues of the right-side of neck at site of former central line.  Right-sided PICC line is in place.  Echogenic material noted within the right internal jugular vein, which is partially compressible.     Impression:     1. No fluid collection.  2. Nonocclusive thrombus within the right internal jugular vein.

## 2024-01-15 NOTE — PLAN OF CARE
Problem: Infection  Goal: Absence of Infection Signs and Symptoms  Outcome: Ongoing, Progressing     Problem: Diabetes Comorbidity  Goal: Blood Glucose Level Within Targeted Range  Outcome: Ongoing, Progressing     Problem: Fluid and Electrolyte Imbalance (Acute Kidney Injury/Impairment)  Goal: Fluid and Electrolyte Balance  Outcome: Ongoing, Progressing     Problem: Oral Intake Inadequate (Acute Kidney Injury/Impairment)  Goal: Optimal Nutrition Intake  Outcome: Ongoing, Progressing     Problem: Renal Function Impairment (Acute Kidney Injury/Impairment)  Goal: Effective Renal Function  Outcome: Ongoing, Progressing     Problem: Skin Injury Risk Increased  Goal: Skin Health and Integrity  Outcome: Ongoing, Progressing

## 2024-01-16 PROBLEM — D72.829 LEUKOCYTOSIS: Status: ACTIVE | Noted: 2024-01-16

## 2024-01-16 PROBLEM — D72.825 BANDEMIA: Status: ACTIVE | Noted: 2024-01-16

## 2024-01-16 LAB
ALBUMIN SERPL BCP-MCNC: 1.8 G/DL (ref 3.5–5.2)
ALP SERPL-CCNC: 119 U/L (ref 55–135)
ALT SERPL W/O P-5'-P-CCNC: 5 U/L (ref 10–44)
ANION GAP SERPL CALC-SCNC: 13 MMOL/L (ref 8–16)
AST SERPL-CCNC: 16 U/L (ref 10–40)
BACTERIA UR CULT: ABNORMAL
BACTERIA UR CULT: ABNORMAL
BASOPHILS # BLD AUTO: 0.05 K/UL (ref 0–0.2)
BASOPHILS # BLD AUTO: 0.06 K/UL (ref 0–0.2)
BASOPHILS NFR BLD: 0.2 % (ref 0–1.9)
BASOPHILS NFR BLD: 0.3 % (ref 0–1.9)
BILIRUB SERPL-MCNC: 0.5 MG/DL (ref 0.1–1)
BUN SERPL-MCNC: 45 MG/DL (ref 8–23)
CALCIUM SERPL-MCNC: 8.8 MG/DL (ref 8.7–10.5)
CHLORIDE SERPL-SCNC: 109 MMOL/L (ref 95–110)
CO2 SERPL-SCNC: 21 MMOL/L (ref 23–29)
CREAT SERPL-MCNC: 3.4 MG/DL (ref 0.5–1.4)
DIFFERENTIAL METHOD BLD: ABNORMAL
DIFFERENTIAL METHOD BLD: ABNORMAL
EOSINOPHIL # BLD AUTO: 0 K/UL (ref 0–0.5)
EOSINOPHIL # BLD AUTO: 0 K/UL (ref 0–0.5)
EOSINOPHIL NFR BLD: 0 % (ref 0–8)
EOSINOPHIL NFR BLD: 0.1 % (ref 0–8)
ERYTHROCYTE [DISTWIDTH] IN BLOOD BY AUTOMATED COUNT: 18.6 % (ref 11.5–14.5)
ERYTHROCYTE [DISTWIDTH] IN BLOOD BY AUTOMATED COUNT: 18.8 % (ref 11.5–14.5)
EST. GFR  (NO RACE VARIABLE): 18.3 ML/MIN/1.73 M^2
GLUCOSE SERPL-MCNC: 44 MG/DL (ref 70–110)
GLUCOSE SERPL-MCNC: 79 MG/DL (ref 70–110)
HCT VFR BLD AUTO: 25.4 % (ref 40–54)
HCT VFR BLD AUTO: 25.4 % (ref 40–54)
HCT VFR BLD AUTO: 25.8 % (ref 40–54)
HGB BLD-MCNC: 7.7 G/DL (ref 14–18)
HGB BLD-MCNC: 7.8 G/DL (ref 14–18)
HGB BLD-MCNC: 8.3 G/DL (ref 14–18)
IMM GRANULOCYTES # BLD AUTO: 0.12 K/UL (ref 0–0.04)
IMM GRANULOCYTES # BLD AUTO: 0.14 K/UL (ref 0–0.04)
IMM GRANULOCYTES NFR BLD AUTO: 0.6 % (ref 0–0.5)
IMM GRANULOCYTES NFR BLD AUTO: 0.7 % (ref 0–0.5)
INFLUENZA A, MOLECULAR: NOT DETECTED
INFLUENZA B, MOLECULAR: NOT DETECTED
LYMPHOCYTES # BLD AUTO: 0.8 K/UL (ref 1–4.8)
LYMPHOCYTES # BLD AUTO: 1 K/UL (ref 1–4.8)
LYMPHOCYTES NFR BLD: 3.7 % (ref 18–48)
LYMPHOCYTES NFR BLD: 4.9 % (ref 18–48)
MAGNESIUM SERPL-MCNC: 2.1 MG/DL (ref 1.6–2.6)
MCH RBC QN AUTO: 26.3 PG (ref 27–31)
MCH RBC QN AUTO: 26.7 PG (ref 27–31)
MCHC RBC AUTO-ENTMCNC: 30.3 G/DL (ref 32–36)
MCHC RBC AUTO-ENTMCNC: 30.7 G/DL (ref 32–36)
MCV RBC AUTO: 87 FL (ref 82–98)
MCV RBC AUTO: 87 FL (ref 82–98)
MONOCYTES # BLD AUTO: 1.9 K/UL (ref 0.3–1)
MONOCYTES # BLD AUTO: 2 K/UL (ref 0.3–1)
MONOCYTES NFR BLD: 9.3 % (ref 4–15)
MONOCYTES NFR BLD: 9.4 % (ref 4–15)
NEUTROPHILS # BLD AUTO: 17.4 K/UL (ref 1.8–7.7)
NEUTROPHILS # BLD AUTO: 18.1 K/UL (ref 1.8–7.7)
NEUTROPHILS NFR BLD: 84.8 % (ref 38–73)
NEUTROPHILS NFR BLD: 86 % (ref 38–73)
NRBC BLD-RTO: 0 /100 WBC
NRBC BLD-RTO: 0 /100 WBC
PHOSPHATE SERPL-MCNC: 3.8 MG/DL (ref 2.7–4.5)
PLATELET # BLD AUTO: 373 K/UL (ref 150–450)
PLATELET # BLD AUTO: 385 K/UL (ref 150–450)
PMV BLD AUTO: 8.3 FL (ref 9.2–12.9)
PMV BLD AUTO: 9.2 FL (ref 9.2–12.9)
POCT GLUCOSE: 114 MG/DL (ref 70–110)
POCT GLUCOSE: 82 MG/DL (ref 70–110)
POCT GLUCOSE: 83 MG/DL (ref 70–110)
POCT GLUCOSE: 96 MG/DL (ref 70–110)
POTASSIUM SERPL-SCNC: 3.4 MMOL/L (ref 3.5–5.1)
PROT SERPL-MCNC: 6.5 G/DL (ref 6–8.4)
RBC # BLD AUTO: 2.92 M/UL (ref 4.6–6.2)
RBC # BLD AUTO: 2.93 M/UL (ref 4.6–6.2)
RSV AG BY MOLECULAR METHOD: NOT DETECTED
SARS-COV-2 RNA RESP QL NAA+PROBE: NOT DETECTED
SODIUM SERPL-SCNC: 143 MMOL/L (ref 136–145)
WBC # BLD AUTO: 20.47 K/UL (ref 3.9–12.7)
WBC # BLD AUTO: 21.11 K/UL (ref 3.9–12.7)

## 2024-01-16 PROCEDURE — 63600175 PHARM REV CODE 636 W HCPCS: Performed by: STUDENT IN AN ORGANIZED HEALTH CARE EDUCATION/TRAINING PROGRAM

## 2024-01-16 PROCEDURE — 87040 BLOOD CULTURE FOR BACTERIA: CPT | Performed by: STUDENT IN AN ORGANIZED HEALTH CARE EDUCATION/TRAINING PROGRAM

## 2024-01-16 PROCEDURE — 85018 HEMOGLOBIN: CPT | Performed by: STUDENT IN AN ORGANIZED HEALTH CARE EDUCATION/TRAINING PROGRAM

## 2024-01-16 PROCEDURE — 20600001 HC STEP DOWN PRIVATE ROOM

## 2024-01-16 PROCEDURE — 82947 ASSAY GLUCOSE BLOOD QUANT: CPT | Performed by: STUDENT IN AN ORGANIZED HEALTH CARE EDUCATION/TRAINING PROGRAM

## 2024-01-16 PROCEDURE — 25000003 PHARM REV CODE 250: Performed by: INTERNAL MEDICINE

## 2024-01-16 PROCEDURE — 0241U SARS-COV2 (COVID) WITH FLU/RSV BY PCR: CPT | Performed by: STUDENT IN AN ORGANIZED HEALTH CARE EDUCATION/TRAINING PROGRAM

## 2024-01-16 PROCEDURE — 36415 COLL VENOUS BLD VENIPUNCTURE: CPT | Performed by: INTERNAL MEDICINE

## 2024-01-16 PROCEDURE — 84100 ASSAY OF PHOSPHORUS: CPT | Performed by: INTERNAL MEDICINE

## 2024-01-16 PROCEDURE — 25000003 PHARM REV CODE 250: Performed by: STUDENT IN AN ORGANIZED HEALTH CARE EDUCATION/TRAINING PROGRAM

## 2024-01-16 PROCEDURE — 83735 ASSAY OF MAGNESIUM: CPT | Performed by: INTERNAL MEDICINE

## 2024-01-16 PROCEDURE — 36415 COLL VENOUS BLD VENIPUNCTURE: CPT | Mod: XB | Performed by: STUDENT IN AN ORGANIZED HEALTH CARE EDUCATION/TRAINING PROGRAM

## 2024-01-16 PROCEDURE — 25000003 PHARM REV CODE 250

## 2024-01-16 PROCEDURE — 85025 COMPLETE CBC W/AUTO DIFF WBC: CPT | Mod: 91 | Performed by: INTERNAL MEDICINE

## 2024-01-16 PROCEDURE — 85014 HEMATOCRIT: CPT | Performed by: STUDENT IN AN ORGANIZED HEALTH CARE EDUCATION/TRAINING PROGRAM

## 2024-01-16 PROCEDURE — 27000207 HC ISOLATION

## 2024-01-16 PROCEDURE — 85025 COMPLETE CBC W/AUTO DIFF WBC: CPT | Performed by: STUDENT IN AN ORGANIZED HEALTH CARE EDUCATION/TRAINING PROGRAM

## 2024-01-16 PROCEDURE — 25000003 PHARM REV CODE 250: Performed by: HOSPITALIST

## 2024-01-16 PROCEDURE — 80053 COMPREHEN METABOLIC PANEL: CPT | Performed by: INTERNAL MEDICINE

## 2024-01-16 RX ADMIN — LIDOCAINE 5% 1 PATCH: 700 PATCH TOPICAL at 06:01

## 2024-01-16 RX ADMIN — NIFEDIPINE 30 MG: 30 TABLET, FILM COATED, EXTENDED RELEASE ORAL at 09:01

## 2024-01-16 RX ADMIN — ALLOPURINOL 50 MG: 300 TABLET ORAL at 09:01

## 2024-01-16 RX ADMIN — APIXABAN 10 MG: 5 TABLET, FILM COATED ORAL at 09:01

## 2024-01-16 RX ADMIN — VANCOMYCIN HYDROCHLORIDE 1250 MG: 1.25 INJECTION, POWDER, LYOPHILIZED, FOR SOLUTION INTRAVENOUS at 09:01

## 2024-01-16 RX ADMIN — ERTAPENEM 500 MG: 1 INJECTION INTRAMUSCULAR; INTRAVENOUS at 09:01

## 2024-01-16 RX ADMIN — ATORVASTATIN CALCIUM 80 MG: 40 TABLET, FILM COATED ORAL at 09:01

## 2024-01-16 RX ADMIN — FOLIC ACID 1 MG: 1 TABLET ORAL at 09:01

## 2024-01-16 RX ADMIN — ACETAMINOPHEN 1000 MG: 500 TABLET ORAL at 05:01

## 2024-01-16 RX ADMIN — POTASSIUM BICARBONATE 50 MEQ: 978 TABLET, EFFERVESCENT ORAL at 10:01

## 2024-01-16 RX ADMIN — ACETAMINOPHEN 1000 MG: 500 TABLET ORAL at 09:01

## 2024-01-16 RX ADMIN — FAMOTIDINE 20 MG: 20 TABLET, FILM COATED ORAL at 09:01

## 2024-01-16 RX ADMIN — ACETAMINOPHEN 1000 MG: 500 TABLET ORAL at 03:01

## 2024-01-16 NOTE — ASSESSMENT & PLAN NOTE
- Admitted with ELIZABETH on CKD  - Initial  with sCr 7.5; improving with dialysis  - Nephrology consulted, started on HD for clearance  - Still making some urine  - Per nephro- no need to arrange outpatient HD at this time, will need repeat renal function panel 3 days after discharge and close nephro follow-up- patient and POA updated with regards to plan   - sCr is slowly improving

## 2024-01-16 NOTE — SUBJECTIVE & OBJECTIVE
Interval History: Patient seems fatigued this am. Poor PO intake- was more interactive a few days ago. 1.1L UOP 01/15. No nausea today- some nausea/vomiting 01/15.     Objective:     Vital Signs (Most Recent):  Temp: 97.9 °F (36.6 °C) (01/16/24 1140)  Pulse: 98 (01/16/24 1514)  Resp: 18 (01/16/24 1140)  BP: (!) 122/55 (01/16/24 1140)  SpO2: 98 % (01/16/24 1140) Vital Signs (24h Range):  Temp:  [97.5 °F (36.4 °C)-97.9 °F (36.6 °C)] 97.9 °F (36.6 °C)  Pulse:  [66-98] 98  Resp:  [18] 18  SpO2:  [97 %-98 %] 98 %  BP: (122-128)/(55-61) 122/55     Weight: 80.7 kg (178 lb)  Body mass index is 24.83 kg/m².    Intake/Output Summary (Last 24 hours) at 1/16/2024 1534  Last data filed at 1/16/2024 0950  Gross per 24 hour   Intake 320 ml   Output 700 ml   Net -380 ml      Physical Exam  Gen: in NAD, appears stated age, appears chronically ill   Neuro: awake, oriented to self alone, not oriented to location, time or situation  HEENT: NTNC, EOMI, PERRL, MMM, muddy sclera  CVS: RRR, sys ejection murmur, no rubs or gallops; S1/S2 auscultated with no S3 or S4; capillary refill < 2 sec  Resp: right basilar rales, no w/r/r; no belabored breathing or accessory muscle use appreciated   Abd: BS+ in all 4 quadrants; NT, diffusely TTP, soft to palpation; no organomegaly appreciated   Extrem: pulses full, equal, and regular over all 4 extremities; no UE edema, BL LE edema    Significant Labs: All pertinent labs within the past 24 hours have been reviewed.  CBC:   Recent Labs   Lab 01/15/24  0436 01/16/24  0401 01/16/24  0957   WBC 12.44 21.11* 20.47*   HGB 8.7* 7.7* 7.8*   HCT 28.1* 25.4* 25.4*    385 373     CMP:   Recent Labs   Lab 01/15/24  0436 01/16/24  0401 01/16/24  0727    143  --    K 3.6 3.4*  --     109  --    CO2 20* 21*  --    GLU 69* 44* 79   BUN 47* 45*  --    CREATININE 3.7* 3.4*  --    CALCIUM 9.1 8.8  --    PROT 7.1 6.5  --    ALBUMIN 2.1* 1.8*  --    BILITOT 0.4 0.5  --    ALKPHOS 105 119  --    AST 19  "16  --    ALT 6* 5*  --    ANIONGAP 13 13  --      Cardiac Markers: No results for input(s): "CKMB", "MYOGLOBIN", "BNP", "TROPISTAT" in the last 48 hours.    Significant Imaging: I have reviewed all pertinent imaging results/findings within the past 24 hours.    US of Rt Neck:  FINDINGS:  No fluid collection in the soft tissues of the right-side of neck at site of former central line.  Right-sided PICC line is in place.  Echogenic material noted within the right internal jugular vein, which is partially compressible.     Impression:     1. No fluid collection.  2. Nonocclusive thrombus within the right internal jugular vein.  "

## 2024-01-16 NOTE — PLAN OF CARE
Recommendations  1. Continue Soft + Bite Sized diet-texture per SLP- encourage po intake   2. Continue ONS- Novasource Renal for optimization of calorie intake   3. RD following     Goals: Meet % EEN, EPN by RD f/u  Nutrition Goal Status: goal not met   Communication of RD Recs:  (POC)

## 2024-01-16 NOTE — PLAN OF CARE
[facility] - Cardiology Stepdown  Discharge Reassessment    Primary Care Provider: Irlanda Valenzuela -    Expected Discharge Date: 1/18/2024    Reassessment (most recent)       Discharge Reassessment - 01/16/24 1714          Discharge Reassessment    Assessment Type Discharge Planning Reassessment     Did the patient's condition or plan change since previous assessment? Yes     Discharge Plan discussed with: Adult children     Communicated ELY with patient/caregiver Date not available/Unable to determine     Discharge Plan A Return to nursing home     Discharge Plan B New Nursing Home placement - halfway care facility     DME Needed Upon Discharge  none     Transition of Care Barriers None     Why the patient remains in the hospital Requires continued medical care        Post-Acute Status    Post-Acute Authorization Placement     Post-Acute Placement Status Pending medical clearance/testing                 Pt is a halfway resident at [facility].  Discharge Plan A and Plan B have been determined by review of patient's clinical status, future medical and therapeutic needs, and coverage/benefits for post-acute care in coordination with multidisciplinary team members.  Will continue to follow.      Veena Lozoya LMSW  [facility]  [id number]

## 2024-01-16 NOTE — ASSESSMENT & PLAN NOTE
- diffusely TTP  - CT A/P ordered- especially in setting of worsening leukocytosis  - adding vanc with erta in setting of leukocytosis of unknown etiology

## 2024-01-16 NOTE — PLAN OF CARE
Problem: Infection  Goal: Absence of Infection Signs and Symptoms  Outcome: Ongoing, Progressing     Problem: Infection (Hemodialysis)  Goal: Absence of Infection Signs and Symptoms  Outcome: Ongoing, Progressing     Problem: Diabetes Comorbidity  Goal: Blood Glucose Level Within Targeted Range  Outcome: Ongoing, Progressing     Problem: Fluid and Electrolyte Imbalance (Acute Kidney Injury/Impairment)  Goal: Fluid and Electrolyte Balance  Outcome: Ongoing, Progressing     Problem: Oral Intake Inadequate (Acute Kidney Injury/Impairment)  Goal: Optimal Nutrition Intake  Outcome: Ongoing, Progressing     Problem: Renal Function Impairment (Acute Kidney Injury/Impairment)  Goal: Effective Renal Function  Outcome: Ongoing, Progressing     Problem: Impaired Wound Healing  Goal: Optimal Wound Healing  Outcome: Ongoing, Progressing     Problem: Skin Injury Risk Increased  Goal: Skin Health and Integrity  Outcome: Ongoing, Progressing     Problem: Coping Ineffective  Goal: Effective Coping  Outcome: Ongoing, Progressing

## 2024-01-16 NOTE — PLAN OF CARE
Problem: Adult Inpatient Plan of Care  Goal: Plan of Care Review  Outcome: Ongoing, Progressing  Flowsheets (Taken 1/16/2024 1535)  Plan of Care Reviewed With: patient  Goal: Patient-Specific Goal (Individualized)  Outcome: Ongoing, Progressing     Problem: Infection  Goal: Absence of Infection Signs and Symptoms  Outcome: Adequate for Care Transition  Intervention: Prevent or Manage Infection  Flowsheets (Taken 1/16/2024 1535)  Fever Reduction/Comfort Measures: fluid intake increased  Infection Management: aseptic technique maintained  Isolation Precautions: precautions maintained     Problem: Adult Inpatient Plan of Care  Goal: Absence of Hospital-Acquired Illness or Injury  Intervention: Identify and Manage Fall Risk  Flowsheets (Taken 1/16/2024 1535)  Safety Promotion/Fall Prevention: assistive device/personal item within reach  Intervention: Prevent Skin Injury  Flowsheets (Taken 1/16/2024 1535)  Body Position: head facing, left     Problem: Adult Inpatient Plan of Care  Goal: Absence of Hospital-Acquired Illness or Injury  Intervention: Identify and Manage Fall Risk  Flowsheets (Taken 1/16/2024 1535)  Safety Promotion/Fall Prevention: assistive device/personal item within reach  Intervention: Prevent Skin Injury  Flowsheets (Taken 1/16/2024 1535)  Body Position: head facing, left

## 2024-01-16 NOTE — CARE UPDATE
I spoke with Leigh ALBERTS. I am concerned that Mr. Reis is declining- looking into additional causes of infection and acute blood loss w/drop in H:H- I discussed that hospice may better serve him at this point, but will re-evaluate how he is looking tomorrow. She is in agreement. She understands that her uncle is in his final few months of his life. If he were to transition, she is ok with NH w/hospice- she discussed the possibility of home hospice, but it doesn't sound like there are many family members that can be present to help take care of Mr. Reis at home, and some hospice at [facility] would be better option for him if that was the path we decided to go. Will f/u on CT of abd/pelvis + CTH. Ultimate plan tomorrow for next steps.         Gabbi Small MD  Department of Hospital Medicine  Department of Pediatrics  1/16/2024

## 2024-01-16 NOTE — PROGRESS NOTES
[facility] - Cardiology Stepdown  Adult Nutrition  Progress Note    SUMMARY       Recommendations  1. Continue Soft + Bite Sized diet-texture per SLP- encourage po intake   2. Continue ONS- Novasource Renal for optimization of calorie intake   3. RD following    Goals: Meet % EEN, EPN by RD f/u  Nutrition Goal Status: goal not met   Communication of RD Recs:  (POC)    Assessment and Plan    Endocrine  Severe malnutrition  Malnutrition Type:  Context: chronic illness  Level: severe    Related to (etiology):   Inadequate energy intake    Signs and Symptoms (as evidenced by):   Malnutrition Characteristic Summary:  Weight Loss (Malnutrition): other (see comments) (15% x 3 months)  Energy Intake (Malnutrition): less than 75% for greater than or equal to 3 months  Subcutaneous Fat (Malnutrition): mild depletion  Muscle Mass (Malnutrition): moderate depletion    Interventions (treatment strategy):  1. Continue Soft + Bite Sized diet-texture per SLP- encourage po intake   2. Continue ONS- Novasource Renal for optimization of calorie intake   3. RD following      Nutrition Diagnosis Status:   Continues          Malnutrition Assessment  Malnutrition Context: chronic illness  Malnutrition Level: severe  Skin (Micronutrient): none  Nails (Micronutrient): none  Hair/Scalp (Micronutrient): none  Eyes (Micronutrient): none  Extraoral (Micronutrient): none  Lips/Mucous Membranes (Micronutrient): vertical cracks  Teeth (Micronutrient): none  Tongue (Micronutrient): none  Neck/Chest (Micronutrient): muscle wasting  Musculoskeletal/Lower Extremities: subcutaneous fat loss, muscle wasting       Weight Loss (Malnutrition): other (see comments) (15% x 3 months)  Energy Intake (Malnutrition): less than 75% for greater than or equal to 3 months  Subcutaneous Fat (Malnutrition): mild depletion  Muscle Mass (Malnutrition): moderate depletion   Orbital Region (Subcutaneous Fat Loss): mild depletion  Upper Arm Region (Subcutaneous Fat  "Loss): well nourished   [place] Region (Muscle Loss): moderate depletion  Clavicle Bone Region (Muscle Loss): moderate depletion  Clavicle and Acromion Bone Region (Muscle Loss): mild depletion  Dorsal Hand (Muscle Loss): moderate depletion  Patellar Region (Muscle Loss): moderate depletion  Anterior Thigh Region (Muscle Loss): mild depletion  Posterior Calf Region (Muscle Loss): mild depletion                 Reason for Assessment    Reason For Assessment: RD follow-up  Diagnosis:  (complete heart block)  Relevant Medical History: HTN, dyslipidemia, anemia, T2DM, GERD, CKD 3b  Interdisciplinary Rounds: did not attend  General Information Comments: RD follow-up: Pt is currently on a soft and bite sized diet, pt consuming 0-25% meal consumption. Pt receiving supplement w/ meals- noted 0% supplement consumption. Malnutrition dx continues. LBM noted-1/16/24  Nutrition Discharge Planning: Pending medical course    Nutrition Risk Screen    Nutrition Risk Screen: no indicators present    Nutrition/Diet History    Spiritual, Cultural Beliefs, Jewish Practices, Values that Affect Care: no    Anthropometrics    Temp: 97.9 °F (36.6 °C)  Height Method: Stated  Height: 5' 11" (180.3 cm)  Height (inches): 71 in  Weight Method: Standard Scale  Weight: 80.7 kg (178 lb)  Weight (lb): 178 lb  Ideal Body Weight (IBW), Male: 172 lb  % Ideal Body Weight, Male (lb): 103.49 %  BMI (Calculated): 24.8  BMI Grade: 18.5-24.9 - normal       Lab/Procedures/Meds    Pertinent Labs Reviewed: reviewed  Pertinent Labs Comments: Hgb: 7.5, Hct: 23.9, BUN: 56, Creatinine: 4.8, eGFR: 12.1, Calcium: 8.5  Pertinent Medications Reviewed: reviewed  Pertinent Medications Comments: heparin, folic acid, atorvastatin, famotidine    Estimated/Assessed Needs    Weight Used For Calorie Calculations: 80.7 kg (178 lb)  Energy Calorie Requirements (kcal): 7282-9286 kcal  Energy Need Method: Kcal/kg (25-30)  Protein Requirements: 81-97g (1-1.2g/kg)  Weight Used For " Protein Calculations: 80.7 kg (178 lb)  Fluid Requirements (mL): 1ml/kcal or per MD  Estimated Fluid Requirement Method: RDA Method  RDA Method (mL): 2018  CHO Requirement: 252 - 303g      Nutrition Prescription Ordered    Current Diet Order: Soft + Bite Sized diet     Evaluation of Received Nutrient/Fluid Intake    I/O: + 774 mL since admit  Energy Calories Required: not meeting needs  Protein Required: not meeting needs  Fluid Required:  (As per MD)  Tolerance: tolerating  % Intake of Estimated Energy Needs: 0 - 25 %  % Meal Intake: 0 - 25 %    Nutrition Risk    Level of Risk/Frequency of Follow-up:  (Follow-up 1x/week)     Monitor and Evaluation    Food and Nutrient Intake: energy intake, food and beverage intake  Food and Nutrient Adminstration: diet order  Knowledge/Beliefs/Attitudes: food and nutrition knowledge/skill  Physical Activity and Function: nutrition-related ADLs and IADLs  Anthropometric Measurements: height/length, weight, weight change, body mass index  Biochemical Data, Medical Tests and Procedures: gastrointestinal profile, electrolyte and renal panel, inflammatory profile, glucose/endocrine profile, lipid profile  Nutrition-Focused Physical Findings: overall appearance     Nutrition Follow-Up    RD Follow-up?: Yes

## 2024-01-17 LAB
ALBUMIN SERPL BCP-MCNC: 1.8 G/DL (ref 3.5–5.2)
ALP SERPL-CCNC: 103 U/L (ref 55–135)
ALT SERPL W/O P-5'-P-CCNC: 6 U/L (ref 10–44)
ANION GAP SERPL CALC-SCNC: 9 MMOL/L (ref 8–16)
AST SERPL-CCNC: 17 U/L (ref 10–40)
BASOPHILS # BLD AUTO: 0.09 K/UL (ref 0–0.2)
BASOPHILS NFR BLD: 0.5 % (ref 0–1.9)
BILIRUB SERPL-MCNC: 0.5 MG/DL (ref 0.1–1)
BUN SERPL-MCNC: 47 MG/DL (ref 8–23)
CALCIUM SERPL-MCNC: 8.5 MG/DL (ref 8.7–10.5)
CHLORIDE SERPL-SCNC: 110 MMOL/L (ref 95–110)
CO2 SERPL-SCNC: 23 MMOL/L (ref 23–29)
CREAT SERPL-MCNC: 3.8 MG/DL (ref 0.5–1.4)
DIFFERENTIAL METHOD BLD: ABNORMAL
EOSINOPHIL # BLD AUTO: 0.2 K/UL (ref 0–0.5)
EOSINOPHIL NFR BLD: 0.9 % (ref 0–8)
ERYTHROCYTE [DISTWIDTH] IN BLOOD BY AUTOMATED COUNT: 18.7 % (ref 11.5–14.5)
EST. GFR  (NO RACE VARIABLE): 16 ML/MIN/1.73 M^2
GLUCOSE SERPL-MCNC: 79 MG/DL (ref 70–110)
HCT VFR BLD AUTO: 24.7 % (ref 40–54)
HGB BLD-MCNC: 7.5 G/DL (ref 14–18)
IMM GRANULOCYTES # BLD AUTO: 0.19 K/UL (ref 0–0.04)
IMM GRANULOCYTES NFR BLD AUTO: 1 % (ref 0–0.5)
LYMPHOCYTES # BLD AUTO: 1 K/UL (ref 1–4.8)
LYMPHOCYTES NFR BLD: 4.8 % (ref 18–48)
MAGNESIUM SERPL-MCNC: 2.2 MG/DL (ref 1.6–2.6)
MCH RBC QN AUTO: 26.3 PG (ref 27–31)
MCHC RBC AUTO-ENTMCNC: 30.4 G/DL (ref 32–36)
MCV RBC AUTO: 87 FL (ref 82–98)
MONOCYTES # BLD AUTO: 1.8 K/UL (ref 0.3–1)
MONOCYTES NFR BLD: 9.3 % (ref 4–15)
NEUTROPHILS # BLD AUTO: 16.5 K/UL (ref 1.8–7.7)
NEUTROPHILS NFR BLD: 83.5 % (ref 38–73)
NRBC BLD-RTO: 0 /100 WBC
PHOSPHATE SERPL-MCNC: 3 MG/DL (ref 2.7–4.5)
PLATELET # BLD AUTO: 429 K/UL (ref 150–450)
PMV BLD AUTO: 8.7 FL (ref 9.2–12.9)
POCT GLUCOSE: 89 MG/DL (ref 70–110)
POCT GLUCOSE: 92 MG/DL (ref 70–110)
POCT GLUCOSE: 95 MG/DL (ref 70–110)
POCT GLUCOSE: 97 MG/DL (ref 70–110)
POTASSIUM SERPL-SCNC: 3.3 MMOL/L (ref 3.5–5.1)
PROCALCITONIN SERPL IA-MCNC: 0.9 NG/ML
PROT SERPL-MCNC: 6.3 G/DL (ref 6–8.4)
RBC # BLD AUTO: 2.85 M/UL (ref 4.6–6.2)
SODIUM SERPL-SCNC: 142 MMOL/L (ref 136–145)
VANCOMYCIN SERPL-MCNC: 14.6 UG/ML
WBC # BLD AUTO: 19.74 K/UL (ref 3.9–12.7)

## 2024-01-17 PROCEDURE — 63600175 PHARM REV CODE 636 W HCPCS: Performed by: STUDENT IN AN ORGANIZED HEALTH CARE EDUCATION/TRAINING PROGRAM

## 2024-01-17 PROCEDURE — 84100 ASSAY OF PHOSPHORUS: CPT | Performed by: INTERNAL MEDICINE

## 2024-01-17 PROCEDURE — 85025 COMPLETE CBC W/AUTO DIFF WBC: CPT | Performed by: INTERNAL MEDICINE

## 2024-01-17 PROCEDURE — 25000003 PHARM REV CODE 250: Performed by: INTERNAL MEDICINE

## 2024-01-17 PROCEDURE — 36415 COLL VENOUS BLD VENIPUNCTURE: CPT | Mod: XB | Performed by: STUDENT IN AN ORGANIZED HEALTH CARE EDUCATION/TRAINING PROGRAM

## 2024-01-17 PROCEDURE — 99233 SBSQ HOSP IP/OBS HIGH 50: CPT | Mod: FS,,, | Performed by: STUDENT IN AN ORGANIZED HEALTH CARE EDUCATION/TRAINING PROGRAM

## 2024-01-17 PROCEDURE — 99497 ADVNCD CARE PLAN 30 MIN: CPT | Mod: ,,, | Performed by: STUDENT IN AN ORGANIZED HEALTH CARE EDUCATION/TRAINING PROGRAM

## 2024-01-17 PROCEDURE — 27000207 HC ISOLATION

## 2024-01-17 PROCEDURE — 80202 ASSAY OF VANCOMYCIN: CPT | Performed by: STUDENT IN AN ORGANIZED HEALTH CARE EDUCATION/TRAINING PROGRAM

## 2024-01-17 PROCEDURE — 83735 ASSAY OF MAGNESIUM: CPT | Performed by: INTERNAL MEDICINE

## 2024-01-17 PROCEDURE — 20600001 HC STEP DOWN PRIVATE ROOM

## 2024-01-17 PROCEDURE — 99233 SBSQ HOSP IP/OBS HIGH 50: CPT | Mod: FS,,, | Performed by: INTERNAL MEDICINE

## 2024-01-17 PROCEDURE — 80053 COMPREHEN METABOLIC PANEL: CPT | Performed by: INTERNAL MEDICINE

## 2024-01-17 PROCEDURE — 63600175 PHARM REV CODE 636 W HCPCS: Performed by: NURSE PRACTITIONER

## 2024-01-17 PROCEDURE — 84145 PROCALCITONIN (PCT): CPT | Performed by: STUDENT IN AN ORGANIZED HEALTH CARE EDUCATION/TRAINING PROGRAM

## 2024-01-17 PROCEDURE — 25000003 PHARM REV CODE 250: Performed by: STUDENT IN AN ORGANIZED HEALTH CARE EDUCATION/TRAINING PROGRAM

## 2024-01-17 PROCEDURE — 25000003 PHARM REV CODE 250: Performed by: HOSPITALIST

## 2024-01-17 PROCEDURE — 25000003 PHARM REV CODE 250

## 2024-01-17 RX ORDER — SODIUM CHLORIDE, SODIUM LACTATE, POTASSIUM CHLORIDE, CALCIUM CHLORIDE 600; 310; 30; 20 MG/100ML; MG/100ML; MG/100ML; MG/100ML
INJECTION, SOLUTION INTRAVENOUS CONTINUOUS
Status: ACTIVE | OUTPATIENT
Start: 2024-01-17 | End: 2024-01-17

## 2024-01-17 RX ADMIN — ACETAMINOPHEN 1000 MG: 500 TABLET ORAL at 05:01

## 2024-01-17 RX ADMIN — ACETAMINOPHEN 1000 MG: 500 TABLET ORAL at 09:01

## 2024-01-17 RX ADMIN — LIDOCAINE 5% 1 PATCH: 700 PATCH TOPICAL at 09:01

## 2024-01-17 RX ADMIN — ALLOPURINOL 50 MG: 300 TABLET ORAL at 10:01

## 2024-01-17 RX ADMIN — FOLIC ACID 1 MG: 1 TABLET ORAL at 10:01

## 2024-01-17 RX ADMIN — APIXABAN 10 MG: 5 TABLET, FILM COATED ORAL at 10:01

## 2024-01-17 RX ADMIN — APIXABAN 10 MG: 5 TABLET, FILM COATED ORAL at 09:01

## 2024-01-17 RX ADMIN — ATORVASTATIN CALCIUM 80 MG: 40 TABLET, FILM COATED ORAL at 10:01

## 2024-01-17 RX ADMIN — SODIUM CHLORIDE: 9 INJECTION, SOLUTION INTRAVENOUS at 09:01

## 2024-01-17 RX ADMIN — FAMOTIDINE 20 MG: 20 TABLET, FILM COATED ORAL at 10:01

## 2024-01-17 RX ADMIN — ERTAPENEM 500 MG: 1 INJECTION INTRAMUSCULAR; INTRAVENOUS at 11:01

## 2024-01-17 RX ADMIN — NIFEDIPINE 30 MG: 30 TABLET, FILM COATED, EXTENDED RELEASE ORAL at 10:01

## 2024-01-17 RX ADMIN — VANCOMYCIN HYDROCHLORIDE 1000 MG: 1 INJECTION, POWDER, LYOPHILIZED, FOR SOLUTION INTRAVENOUS at 11:01

## 2024-01-17 RX ADMIN — SODIUM CHLORIDE, POTASSIUM CHLORIDE, SODIUM LACTATE AND CALCIUM CHLORIDE: 600; 310; 30; 20 INJECTION, SOLUTION INTRAVENOUS at 03:01

## 2024-01-17 NOTE — PROGRESS NOTES
[facility] - Cardiology [facility] Medicine  Progress Note    Patient Name: Chato Bowie  MRN: [id number]  Patient Class: IP- Inpatient   Admission Date: 12/28/2023  Length of Stay: 19 days  Attending Physician: Gabbi Small MD  Primary Care Provider: Irlanda Valenzuela -        Subjective:     Principal Problem:Acute renal failure superimposed on stage 3b chronic kidney disease        HPI:  73yoM w/PMHx of T2DM, HTN, HLD, CHF, CAD, HLD, CKD 3b, alcohol use disorder, and multiple thyroid nodules presented from [facility] with RLQ pain, fatigue, bradycardia ~30's, weakness, and lab abnormalities including Cr 7.1 and . 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  On interview patient lethargic and disoriented, history obtained via chart review.    In ED pt AF, bradycardic ~40's, RR in the mid 20's, BP ~110/50, satting 100% on 2L NC. Labs significant for Hb 7.2, ANC 8.3, Na 132, Cl 87, HCO3 23, AG 22, , Cr 7.5, phos 8.3, Albumin 1.9, AST 74, , Lipase 146. UA with 2+ protein, 2+ occult blood, 3+ leukocytes, >100WBC and many bacteria. EKG showed wide QRS with LBBB, vent rate 45, QT/QTc 652/563. CXR showed mild retraction of R-sided PICC into mid SVC. CT A&P in progress. In ED jackson placed, started on CTX, given fluids, nephrology consulted. Admitting to [facility] for further management.     Overview/Hospital Course:  Admitted for CHB which EP thought to be 2/2 uremia/ELIZABETH and infection. Has not required pacing and is HDS. Recommended holding AV iker agents. BUN and sCr newly elevated, seen by nephrology who started dialysis for clearance. Tolerated dialysis with plans to further dialyze. ID recommended erta x 7 days for ESBL EColi UTI. Continued daptomycin for his BL heel OM.  C/b delirium for which CT head which was unremarkable. Stepped  down to  but came back to CCU after he went into torsades on 12/31. CPR was initiated and pt regained pulse and consciousness; bradycardic post event with HR ~50. Electrolytes replaced. TVP placed in CCU; spoke with SID who confirmed a DNR status but that she would want a pacemaker placed. EP consulted for PPM insertion. Had another episode of torsades (~ 2 mins) that became CHB and then was paced to NSR. TVP placed. Palliative care consulted for GOC/ACP. Pt more alerted and oriented, states he would like to proceed with the procedure, Micra pacemaker placed on 1/5. Bleeding from groin site with BPs in 80/50s that recovered with 1 L NS bolus. Stepped down to hospital medicine 1/5. Received 2 unit prbc's 1/5-1/6 with stable H/H since. Nephrology feels that patient will need long-term dialysis so Interventional Nephro planning line placement 1/11.  Heparin gtt in lieu of Eliquis prior to procedure.     Palliative care with ongoing GOC discussion for TDC vs. Home w/hospice. On further review with nephrology and patient's overall renal function, he was having good UOP and stabilization of renal function. Ok to hold off on TDC placement and outpatient HD arrangement. Stopped heparin gtt 01/12 and resumed home eliquis. Trialysis catheter removed 01/13- noted to have some purulence- small amount- at suture site. US of Rt neck- Rt IJ clot appreciated. Eliquis increased to 10mg BID. Repeat Ucx +iv for proteus mirabilis ESBL- planning for 10d of erta. He had uptrending WBC 01/16- repeat blood cultures ordered. CT A/P obtained due to abd pain-diffuse.     Interval History: Patient seems fatigued this am. Poor PO intake- was more interactive a few days ago. 1.1L UOP 01/15. No nausea today- some nausea/vomiting 01/15.     Objective:     Vital Signs (Most Recent):  Temp: 97.9 °F (36.6 °C) (01/16/24 1140)  Pulse: 98 (01/16/24 1514)  Resp: 18 (01/16/24 1140)  BP: (!) 122/55 (01/16/24 1140)  SpO2: 98 % (01/16/24 1140) Vital Signs  "(24h Range):  Temp:  [97.5 °F (36.4 °C)-97.9 °F (36.6 °C)] 97.9 °F (36.6 °C)  Pulse:  [66-98] 98  Resp:  [18] 18  SpO2:  [97 %-98 %] 98 %  BP: (122-128)/(55-61) 122/55     Weight: 80.7 kg (178 lb)  Body mass index is 24.83 kg/m².    Intake/Output Summary (Last 24 hours) at 1/16/2024 1534  Last data filed at 1/16/2024 0950  Gross per 24 hour   Intake 320 ml   Output 700 ml   Net -380 ml      Physical Exam  Gen: in NAD, appears stated age, appears chronically ill   Neuro: awake, oriented to self alone, not oriented to location, time or situation  HEENT: NTNC, EOMI, PERRL, MMM, muddy sclera  CVS: RRR, sys ejection murmur, no rubs or gallops; S1/S2 auscultated with no S3 or S4; capillary refill < 2 sec  Resp: right basilar rales, no w/r/r; no belabored breathing or accessory muscle use appreciated   Abd: BS+ in all 4 quadrants; NT, diffusely TTP, soft to palpation; no organomegaly appreciated   Extrem: pulses full, equal, and regular over all 4 extremities; no UE edema, BL LE edema    Significant Labs: All pertinent labs within the past 24 hours have been reviewed.  CBC:   Recent Labs   Lab 01/15/24  0436 01/16/24  0401 01/16/24  0957   WBC 12.44 21.11* 20.47*   HGB 8.7* 7.7* 7.8*   HCT 28.1* 25.4* 25.4*    385 373     CMP:   Recent Labs   Lab 01/15/24  0436 01/16/24  0401 01/16/24  0727    143  --    K 3.6 3.4*  --     109  --    CO2 20* 21*  --    GLU 69* 44* 79   BUN 47* 45*  --    CREATININE 3.7* 3.4*  --    CALCIUM 9.1 8.8  --    PROT 7.1 6.5  --    ALBUMIN 2.1* 1.8*  --    BILITOT 0.4 0.5  --    ALKPHOS 105 119  --    AST 19 16  --    ALT 6* 5*  --    ANIONGAP 13 13  --      Cardiac Markers: No results for input(s): "CKMB", "MYOGLOBIN", "BNP", "TROPISTAT" in the last 48 hours.    Significant Imaging: I have reviewed all pertinent imaging results/findings within the past 24 hours.    US of Rt Neck:  FINDINGS:  No fluid collection in the soft tissues of the right-side of neck at site of former " central line.  Right-sided PICC line is in place.  Echogenic material noted within the right internal jugular vein, which is partially compressible.     Impression:     1. No fluid collection.  2. Nonocclusive thrombus within the right internal jugular vein.    Assessment/Plan:      * Acute renal failure superimposed on stage 3b chronic kidney disease  - Admitted with ELIZABETH on CKD  - Initial  with sCr 7.5; improving with dialysis  - Nephrology consulted, started on HD for clearance  - Still making some urine  - Per nephro- no need to arrange outpatient HD at this time, will need repeat renal function panel 3 days after discharge and close nephro follow-up- patient and POA updated with regards to plan   - sCr is slowly improving        Leukocytosis  - worsening leukocytosis today  - unknown etiology- patient does have ESBL UTI, but adequately treated w/IV erta  - CT a/p ordered  - repeat blood cultures  - ordered procal  - begin vanc for MRSA coverage  - CXR w/possible RLL consolidation ?      Thrombosis of right internal jugular vein  - US of neck obtained w/some purulence appreciated from suture of trialysis line- no fluid collection  - found to have Rt IJ thrombus  - Continue eliquis 10mg BID for 7d, then plan to decrease to 5mg BID       UTI (urinary tract infection)  - s/p 7d of ertapenem for ESBL E coli  - Repeat UA 01/12 concerning for recurrent UTI - Ucx w/ESBL proteus mirabilis >100,000- can plan for 10d course of IV erta    Irritant contact dermatitis due to fecal, urinary or dual incontinence  - wound care following       Torsades de pointes  - h/o torsades    Complete heart block  - Patient with complete heart block, previous EKG with first degree AV block and LBBB  - Patient on metoprolol and amiodarone as an outpatient  - HR in the 30s on admission  - EP consulted, thought it could be 2/2 uremia/ELIZABETH/infection; held off on TVP initially  - Stepped down to  with plans to see how he improved with  dialysis  - However went into torsades on 12/31  - TVP placed by IC; s/p Micra insertion with EP on 1/5    Osteomyelitis of foot  - admitted 11/27 for BL wounds- OM of BL calcaneus and 5th metatarsal- found to be MRSA +iv  - s/p bone biopsy and debridement via podiatriy  - on 6wks of IV dapto- EOT 01/10/24    Advanced care planning/counseling discussion  - palliative following  - further GOC discussion with Aleena ALBERTS- if Chato continues to worsen tomorrow, then can plan to transition to NH w/hospice      Acute encephalopathy  - improved, likely 2/2 uremic encephalopathy and UTI on admission  - some delirium today- possibility of hospital acquired?   - Delirium precautions  - Proteus mirabilis UTI- on IV erta   - Bcx 01/14- NGTD   - Repeat Blood cultures today, procal  - CTH, CT A/P to evaluate for additional etiologies  - Begin vanc in addition to erta     Bradycardia  - see CHB    Severe malnutrition  Nutrition consulted. Most recent weight and BMI pending    Measurements:  Wt Readings from Last 1 Encounters:   12/29/23 80.7 kg (178 lb)   Body mass index is 24.83 kg/m².    Patient will been screened and assessed by RD.    Malnutrition Type:  Context: chronic illness  Level: severe    Malnutrition Characteristic Summary:  Weight Loss (Malnutrition): other (see comments) (15% x 3 months)  Energy Intake (Malnutrition): less than 75% for greater than or equal to 3 months  Subcutaneous Fat (Malnutrition): mild depletion  Muscle Mass (Malnutrition): moderate depletion    Interventions/Recommendations (treatment strategy):  1. Continue Soft + Bite Sized diet 2. Continue ONS for optimization of calorie intake 3. RD following      Paroxysmal atrial flutter  - stop heparin gtt  - continue eliquis for treatment of VTE- 10mg BID for 7d and then 5mg BID    Severe aortic valve stenosis  - tte/ 12/29  Summary         Left Ventricle: The left ventricle is normal in size. Ventricular mass is normal. Normal wall thickness. There  is concentric remodeling. Septal motion is consistent with bundle branch block. There is normal systolic function with a visually estimated ejection fraction of 55 - 60%. Grade II diastolic dysfunction.    Right Ventricle: Normal right ventricular cavity size. Systolic function is normal.    Left Atrium: Left atrium is severely dilated.    Aortic Valve: There is moderate aortic valve sclerosis. Moderately restricted motion. There is severe stenosis. Aortic valve area by VTI is 0.86 cm². Aortic valve peak velocity is 4.12 m/s. Mean gradient is 43 mmHg. The dimensionless index is 0.22. There is mild to moderate aortic regurgitation.    Mitral Valve: There is bileaflet sclerosis. There is mild annular dilation present.    Tricuspid Valve: There is mild to moderate regurgitation.    Pulmonary Artery: The estimated pulmonary artery systolic pressure is 72 mmHg.    IVC/SVC: Intermediate venous pressure at 8 mmHg.    - outpatient f/u with interventional cardiology      Hypokalemia  Patient has hypokalemia which is Acute and currently controlled. Most recent potassium levels reviewed-   Lab Results   Component Value Date    K 3.4 (L) 01/16/2024   . Will continue potassium replacement per protocol and recheck repeat levels after replacement completed.     GERD (gastroesophageal reflux disease)  - continue PPI      Anemia  - H:H stable, has required about 4u of pRBC during hospitalization  - multifactorial- reported h/o melena- would need EGD/colon, but has not been stable- will need outpatient f/u if patient does not decide to enroll in hospice;also with acute renal failure- also contributing to anemia  - H:H stable- epo administered 01/13    Type 2 diabetes mellitus with kidney complication, with long-term current use of insulin  Patient's FSGs are controlled on current medication regimen.  Last A1c reviewed-   Lab Results   Component Value Date    HGBA1C 5.5 09/20/2023     Most recent fingerstick glucose reviewed-   Recent  Labs   Lab 01/16/24  0733 01/16/24  1126 01/16/24  1706 01/16/24  1859   POCTGLUCOSE 82 83 96 114*       Current correctional scale  Low  Maintain anti-hyperglycemic dose as follows-   Antihyperglycemics (From admission, onward)      Start     Stop Route Frequency Ordered    12/28/23 1818  insulin aspart U-100 pen 0-5 Units         -- SubQ Before meals & nightly PRN 12/28/23 1719          Hold Oral hypoglycemics while patient is in the hospital.    Dyslipidemia  - Cont home statin      Epigastric abdominal pain  - diffusely TTP  - CT A/P ordered- especially in setting of worsening leukocytosis  - adding vanc with erta in setting of leukocytosis of unknown etiology         VTE Risk Mitigation (From admission, onward)           Ordered     apixaban tablet 10 mg  2 times daily         01/13/24 1846     heparin (porcine) 2,000 Units in sodium chloride 0.9% 1,000 mL  As needed (PRN)         01/05/24 1025     heparin infusion 1,000 units/500 ml in 0.9% NaCl (on sterile field)  As needed (PRN)         01/05/24 1024     heparin (porcine) injection 1,000 Units  As needed (PRN)         12/30/23 1056     IP VTE HIGH RISK PATIENT  Once         12/28/23 1719     Place sequential compression device  Until discontinued         12/28/23 1719                    Discharge Planning   ELY: 1/18/2024     Code Status: DNR   Is the patient medically ready for discharge?: No    Reason for patient still in hospital (select all that apply): Patient trending condition  Discharge Plan A: Return to nursing home                Gabbi Small MD  Department of Hospital Medicine   [facility] - Cardiology Stepdown

## 2024-01-17 NOTE — ASSESSMENT & PLAN NOTE
Patient's FSGs are controlled on current medication regimen.  Last A1c reviewed-   Lab Results   Component Value Date    HGBA1C 5.5 09/20/2023     Most recent fingerstick glucose reviewed-   Recent Labs   Lab 01/16/24  0733 01/16/24  1126 01/16/24  1706 01/16/24  1859   POCTGLUCOSE 82 83 96 114*       Current correctional scale  Low  Maintain anti-hyperglycemic dose as follows-   Antihyperglycemics (From admission, onward)    Start     Stop Route Frequency Ordered    12/28/23 1818  insulin aspart U-100 pen 0-5 Units         -- SubQ Before meals & nightly PRN 12/28/23 1719        Hold Oral hypoglycemics while patient is in the hospital.

## 2024-01-17 NOTE — ASSESSMENT & PLAN NOTE
See ACP 1/3-1/17     Insight/goals of care- fair, understands limited nature of his life expectancy. Initially discussed implications of not having permanent pacemaker placed, which would likely result in very limited life expectancy which did not seem acceptable to patient or his niece. Subsequently with conversations regarding other potential limits we might place on patient's care as he continues to decline including HD. Patient now clearly understanding pros/cons of continuing to pursue HD     Social support- Niece is MPOA, patient in nursing home prior to admit, unclear what level of support at home      Spiritual- did not assess     Symptom management- pain related to chronic LE wounds, pain well-controlled with current meds        Recommendations  -DNR, full support  -Leigh Gaffney is MPOA, no secondary decision-makers (sister is living but with dementia)  -will continue to engage patient and MPOA in conversations around how to best align care given patient's continued decline in hospital  -continue oxycodone 10mg q4hr PRN for pain, hydromorphone 0.5mg IV q6hr PRN for breakthrough/wound care

## 2024-01-17 NOTE — ASSESSMENT & PLAN NOTE
"- worsening leukocytosis today  - unknown etiology- patient does have ESBL UTI, but adequately treated w/IV erta  - CT AP w/"Mildly distended gallbladder with probable mild wall thickening.  Small intraluminal gallbladder stone.  Cholecystitis cannot be excluded.  Consider dedicated ultrasound for further evaluation. Mild ill-defined patchy opacification of the lung bases greater on the right mildly increased from prior may reflect atelectasis or infectious/inflammatory process."  - Bcx 01/16- NGTD  - continue vanc and erta  - if no change in WBC tomorrow and patient still desiring full care, then can plan to re-engage ID  - elevated procal   - CXR w/possible RLL consolidation ?    "

## 2024-01-17 NOTE — PLAN OF CARE
Problem: Infection  Goal: Absence of Infection Signs and Symptoms  Outcome: Ongoing, Progressing     Problem: Adult Inpatient Plan of Care  Goal: Plan of Care Review  Outcome: Ongoing, Progressing  Goal: Patient-Specific Goal (Individualized)  Outcome: Ongoing, Progressing  Goal: Absence of Hospital-Acquired Illness or Injury  Outcome: Ongoing, Progressing  Goal: Optimal Comfort and Wellbeing  Outcome: Ongoing, Progressing  Goal: Readiness for Transition of Care  Outcome: Ongoing, Progressing     Problem: Device-Related Complication Risk (Hemodialysis)  Goal: Safe, Effective Therapy Delivery  Outcome: Ongoing, Progressing     Problem: Hemodynamic Instability (Hemodialysis)  Goal: Effective Tissue Perfusion  Outcome: Ongoing, Progressing     Problem: Infection (Hemodialysis)  Goal: Absence of Infection Signs and Symptoms  Outcome: Ongoing, Progressing     Problem: Diabetes Comorbidity  Goal: Blood Glucose Level Within Targeted Range  Outcome: Ongoing, Progressing     Problem: Fluid and Electrolyte Imbalance (Acute Kidney Injury/Impairment)  Goal: Fluid and Electrolyte Balance  Outcome: Ongoing, Progressing     Problem: Oral Intake Inadequate (Acute Kidney Injury/Impairment)  Goal: Optimal Nutrition Intake  Outcome: Ongoing, Progressing     Problem: Renal Function Impairment (Acute Kidney Injury/Impairment)  Goal: Effective Renal Function  Outcome: Ongoing, Progressing     Problem: Impaired Wound Healing  Goal: Optimal Wound Healing  Outcome: Ongoing, Progressing     Problem: Skin Injury Risk Increased  Goal: Skin Health and Integrity  Outcome: Ongoing, Progressing     Problem: Coping Ineffective  Goal: Effective Coping  Outcome: Ongoing, Progressing     Problem: Fall Injury Risk  Goal: Absence of Fall and Fall-Related Injury  Outcome: Ongoing, Progressing

## 2024-01-17 NOTE — ASSESSMENT & PLAN NOTE
- improved, likely 2/2 uremic encephalopathy and UTI on admission  - some delirium today- possibility of hospital acquired?   - Delirium precautions  - Proteus mirabilis UTI- on IV erta   - Bcx 01/14- NGTD   - Repeat Blood cultures today, procal  - CTH, CT A/P to evaluate for additional etiologies  - Begin vanc in addition to erta

## 2024-01-17 NOTE — PROGRESS NOTES
[facility] - [facility]  Nephrology  Progress Note    Patient Name: Chato Bowie  MRN: [id number]  Admission Date: 12/28/2023  Hospital Length of Stay: 20 days  Attending Provider: Gabbi Small MD   Primary Care Physician: Irlanda Valenzuela -  Principal Problem:Acute renal failure superimposed on stage 3b chronic kidney disease    Subjective:     Interval History: Scr 3.8 from 3.4 yesterday. 24 . Appears dry on exam.     Review of patient's allergies indicates:  No Known Allergies  Current Facility-Administered Medications   Medication Frequency    0.9%  NaCl infusion Continuous    0.9%  NaCl infusion PRN    acetaminophen tablet 1,000 mg Q8H    allopurinol split tablet 50 mg Daily    apixaban tablet 10 mg BID    atorvastatin tablet 80 mg Daily    benzonatate capsule 100 mg TID PRN    dextrose 10% bolus 125 mL 125 mL PRN    dextrose 10% bolus 250 mL 250 mL PRN    ertapenem (INVANZ) 500 mg in sodium chloride 0.9% 100 mL IVPB Q24H    famotidine tablet 20 mg Daily    folic acid tablet 1 mg Daily    glucagon (human recombinant) injection 1 mg PRN    glucose chewable tablet 16 g PRN    glucose chewable tablet 24 g PRN    heparin (porcine) 2,000 Units in sodium chloride 0.9% 1,000 mL PRN    heparin (porcine) injection 1,000 Units PRN    heparin infusion 1,000 units/500 ml in 0.9% NaCl (on sterile field) PRN    insulin aspart U-100 pen 0-5 Units QID (AC + HS) PRN    LIDOcaine 5 % patch 1 patch Q24H    LIDOcaine HCL 20 mg/ml (2%) injection PRN    magnesium oxide tablet 800 mg PRN    naloxone 0.4 mg/mL injection 0.02 mg PRN    NIFEdipine 24 hr tablet 30 mg Daily    senna-docusate 8.6-50 mg per tablet 2 tablet BID PRN    sodium chloride 0.9% bolus 250 mL 250 mL PRN    sodium chloride 0.9% flush 10 mL Q12H PRN    sodium chloride 0.9% flush 10 mL PRN    vancomycin - pharmacy to dose pharmacy to manage frequency       Objective:     Vital Signs (Most Recent):  Temp: 97.3 °F (36.3 °C) (01/17/24 1105)  Pulse: 65  (01/17/24 1118)  Resp: 18 (01/17/24 1105)  BP: (!) 119/59 (01/17/24 1105)  SpO2: 96 % (01/17/24 1105) Vital Signs (24h Range):  Temp:  [97.3 °F (36.3 °C)-98.6 °F (37 °C)] 97.3 °F (36.3 °C)  Pulse:  [65-98] 65  Resp:  [16-18] 18  SpO2:  [92 %-98 %] 96 %  BP: (114-123)/(56-60) 119/59     Weight: 80.7 kg (178 lb) (12/29/23 0703)  Body mass index is 24.83 kg/m².  Body surface area is 2.01 meters squared.    I/O last 3 completed shifts:  In: 1150.1 [P.O.:1020; I.V.:43.9; IV Piggyback:86.2]  Out: 870 [Urine:870]     Physical Exam  Vitals and nursing note reviewed.   Constitutional:       General: He is not in acute distress.     Appearance: He is ill-appearing. He is not toxic-appearing.      Interventions: Nasal cannula in place.   HENT:      Head: Normocephalic.      Nose: Nose normal.      Mouth/Throat:      Mouth: Mucous membranes are dry.      Pharynx: No oropharyngeal exudate.   Cardiovascular:      Rate and Rhythm: Normal rate.   Abdominal:      General: Abdomen is flat.   Musculoskeletal:      Cervical back: Normal range of motion. No rigidity.      Right lower leg: No edema.      Left lower leg: No edema.   Lymphadenopathy:      Cervical: No cervical adenopathy.   Skin:     General: Skin is warm and dry.   Neurological:      Mental Status: He is easily aroused.   Psychiatric:         Behavior: Behavior is cooperative.          Significant Labs:  CBC:   Recent Labs   Lab 01/17/24  0642   WBC 19.74*   RBC 2.85*   HGB 7.5*   HCT 24.7*      MCV 87   MCH 26.3*   MCHC 30.4*     CMP:   Recent Labs   Lab 01/17/24  0642   GLU 79   CALCIUM 8.5*   ALBUMIN 1.8*   PROT 6.3      K 3.3*   CO2 23      BUN 47*   CREATININE 3.8*   ALKPHOS 103   ALT 6*   AST 17   BILITOT 0.5     All labs within the past 24 hours have been reviewed.       Assessment/Plan:     Cardiac/Vascular  Complete heart block  - defer to primary team     Renal/  * Acute renal failure superimposed on stage 3b chronic kidney disease  Baseline  creatinine 2-2.5 (longstanding DM with bilateral osteomyelitis, HTN)  Multiple comorbitites as well     On admission serum creatinine 7.5  ELIZABETH appears to be multifactorial, possible prolonged pre-renal state/ATN from hemodynamic changes from CHB vs progressive CKD.   Obstruction relieved by jackson placed 12/28/23 with 500 cc of purulent UOP. Supposedly follows with urology in outpatient setting. Is on tamsulosin.   Pyelonephritis with UA showing 3+ leuks with many bacteria. History of proteus in past.   Low effective circulating volume suggested by physical exam, evidence of acute liver injury in setting of HFpEF with severe aortic stenosis. Home medication included metolazone 1 mg bid     Dialysis consent obtained 12/28/23  Renal US with no hydronephrosis   UCPR: 2.05  Azeem 22  Microalbumin / creatinine ratio: 647.3     Plan/Recommendation    -Recommend LR @ 100 ml/hour x 5 hours   -Ongoing GOC discussions   -Strict ins and outs  -Avoid nephrotoxic agents if possible and renally dose medications  -Avoid drastic hemodynamic changes if possible        Thank you for your consult. I will follow-up with patient. Please contact us if you have any additional questions.    Tanya Dill, LOGAN  Nephrology  [facility] - [facility] Step[facility]

## 2024-01-17 NOTE — ASSESSMENT & PLAN NOTE
Baseline creatinine 2-2.5 (longstanding DM with bilateral osteomyelitis, HTN)  Multiple comorbitites as well     On admission serum creatinine 7.5  ELIZABETH appears to be multifactorial, possible prolonged pre-renal state/ATN from hemodynamic changes from CHB vs progressive CKD.   Obstruction relieved by jackson placed 12/28/23 with 500 cc of purulent UOP. Supposedly follows with urology in outpatient setting. Is on tamsulosin.   Pyelonephritis with UA showing 3+ leuks with many bacteria. History of proteus in past.   Low effective circulating volume suggested by physical exam, evidence of acute liver injury in setting of HFpEF with severe aortic stenosis. Home medication included metolazone 1 mg bid     Dialysis consent obtained 12/28/23  Renal US with no hydronephrosis   UCPR: 2.05  Azeem 22  Microalbumin / creatinine ratio: 647.3     Plan/Recommendation    -Recommend LR @ 100 ml/hour x 5 hours   -Ongoing GOC discussions   -Strict ins and outs  -Avoid nephrotoxic agents if possible and renally dose medications  -Avoid drastic hemodynamic changes if possible

## 2024-01-17 NOTE — SUBJECTIVE & OBJECTIVE
"Interval History: Much improved mental status today.  Patient is able to converse what happened last week- when discussing hospice and what this means, he became silent and did not ask any further questions. When asking the date- he was incorrect.     Objective:     Vital Signs (Most Recent):  Temp: 97.3 °F (36.3 °C) (01/17/24 1557)  Pulse: 70 (01/17/24 1557)  Resp: 18 (01/17/24 1557)  BP: (!) 110/56 (01/17/24 1557)  SpO2: 95 % (01/17/24 1557) Vital Signs (24h Range):  Temp:  [97.3 °F (36.3 °C)-98.6 °F (37 °C)] 97.3 °F (36.3 °C)  Pulse:  [65-84] 70  Resp:  [16-18] 18  SpO2:  [92 %-98 %] 95 %  BP: (110-123)/(56-60) 110/56     Weight: 80.7 kg (178 lb)  Body mass index is 24.83 kg/m².    Intake/Output Summary (Last 24 hours) at 1/17/2024 1635  Last data filed at 1/17/2024 0926  Gross per 24 hour   Intake 868.1 ml   Output 400 ml   Net 468.1 ml      Physical Exam  Gen: in NAD, appears stated age, appears chronically ill   Neuro: awake, oriented to self alone, not oriented to location, time or situation- when asked the year, he says "2002", when asked the month, he says "28"  HEENT: NTNC, EOMI, PERRL, MMM, muddy sclera  CVS: RRR, sys ejection murmur, no rubs or gallops; S1/S2 auscultated with no S3 or S4; capillary refill < 2 sec  Resp: right basilar rales; no belabored breathing or accessory muscle use appreciated   Abd: BS+ in all 4 quadrants; NTND, soft to palpation; no organomegaly appreciated   Extrem: pulses full, equal, and regular over all 4 extremities; no UE or LE edema    Significant Labs: All pertinent labs within the past 24 hours have been reviewed.  CBC:   Recent Labs   Lab 01/16/24  0401 01/16/24  0957 01/16/24  1814 01/17/24  0642   WBC 21.11* 20.47*  --  19.74*   HGB 7.7* 7.8* 8.3* 7.5*   HCT 25.4* 25.4* 25.8* 24.7*    373  --  429     CMP:   Recent Labs   Lab 01/16/24  0401 01/16/24  0727 01/17/24  0642     --  142   K 3.4*  --  3.3*     --  110   CO2 21*  --  23   GLU 44* 79 79 " "  BUN 45*  --  47*   CREATININE 3.4*  --  3.8*   CALCIUM 8.8  --  8.5*   PROT 6.5  --  6.3   ALBUMIN 1.8*  --  1.8*   BILITOT 0.5  --  0.5   ALKPHOS 119  --  103   AST 16  --  17   ALT 5*  --  6*   ANIONGAP 13  --  9     Cardiac Markers: No results for input(s): "CKMB", "MYOGLOBIN", "BNP", "TROPISTAT" in the last 48 hours.    Significant Imaging: I have reviewed all pertinent imaging results/findings within the past 24 hours.    US of Rt Neck:  FINDINGS:  No fluid collection in the soft tissues of the right-side of neck at site of former central line.  Right-sided PICC line is in place.  Echogenic material noted within the right internal jugular vein, which is partially compressible.     Impression:     1. No fluid collection.  2. Nonocclusive thrombus within the right internal jugular vein.  "

## 2024-01-17 NOTE — ASSESSMENT & PLAN NOTE
- H:H stable, has required about 4u of pRBC during hospitalization  - multifactorial- reported h/o melena- would need EGD/colon, but has not been stable- will need outpatient f/u if patient does not decide to enroll in hospice;also with acute renal failure- also contributing to anemia  - H:H slight decrease today- no obvious source of blood loss other than recurrent blood draws- also setting of acute renal failure on CKD

## 2024-01-17 NOTE — PROGRESS NOTES
Billy Sepulveda - Cardiology [facility] Medicine  Progress Note    Patient Name: Chato Bowie  MRN: [id number]  Patient Class: IP- Inpatient   Admission Date: 12/28/2023  Length of Stay: 20 days  Attending Physician: Gabbi Small MD  Primary Care Provider: Irlanda Valenzuela -        Subjective:     Principal Problem:Acute renal failure superimposed on stage 3b chronic kidney disease        HPI:  73yoM w/PMHx of T2DM, HTN, HLD, CHF, CAD, HLD, CKD 3b, alcohol use disorder, and multiple thyroid nodules presented from [facility] with RLQ pain, fatigue, bradycardia ~30's, weakness, and lab abnormalities including Cr 7.1 and . 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  On interview patient lethargic and disoriented, history obtained via chart review.    In ED pt AF, bradycardic ~40's, RR in the mid 20's, BP ~110/50, satting 100% on 2L NC. Labs significant for Hb 7.2, ANC 8.3, Na 132, Cl 87, HCO3 23, AG 22, , Cr 7.5, phos 8.3, Albumin 1.9, AST 74, , Lipase 146. UA with 2+ protein, 2+ occult blood, 3+ leukocytes, >100WBC and many bacteria. EKG showed wide QRS with LBBB, vent rate 45, QT/QTc 652/563. CXR showed mild retraction of R-sided PICC into mid SVC. CT A&P in progress. In ED jackson placed, started on CTX, given fluids, nephrology consulted. Admitting to [facility] for further management.     Overview/Hospital Course:  Admitted for CHB which EP thought to be 2/2 uremia/ELIZABETH and infection. Has not required pacing and is HDS. Recommended holding AV iker agents. BUN and sCr newly elevated, seen by nephrology who started dialysis for clearance. Tolerated dialysis with plans to further dialyze. ID recommended erta x 7 days for ESBL EColi UTI. Continued daptomycin for his BL heel OM.  C/b delirium for which CT head which was unremarkable. Stepped  down to HM but came back to CCU after he went into torsades on 12/31. CPR was initiated and pt regained pulse and consciousness; bradycardic post event with HR ~50. Electrolytes replaced. TVP placed in CCU; spoke with SID who confirmed a DNR status but that she would want a pacemaker placed. EP consulted for PPM insertion. Had another episode of torsades (~ 2 mins) that became CHB and then was paced to NSR. TVP placed. Palliative care consulted for GOC/ACP. Pt more alerted and oriented, states he would like to proceed with the procedure, Micra pacemaker placed on 1/5. Bleeding from groin site with BPs in 80/50s that recovered with 1 L NS bolus. Stepped down to hospital medicine 1/5. Received 2 unit prbc's 1/5-1/6 with stable H/H since. Nephrology feels that patient will need long-term dialysis so Interventional Nephro planning line placement 1/11.  Heparin gtt in lieu of Eliquis prior to procedure.     Palliative care with ongoing GOC discussion for TDC vs. Home w/hospice. On further review with nephrology and patient's overall renal function, he was having good UOP and stabilization of renal function. Ok to hold off on TDC placement and outpatient HD arrangement. Stopped heparin gtt 01/12 and resumed home eliquis. Trialysis catheter removed 01/13- noted to have some purulence- small amount- at suture site. US of Rt neck- Rt IJ clot appreciated. Eliquis increased to 10mg BID. Repeat Ucx +iv for proteus mirabilis ESBL- planning for 10d of erta. He had uptrending WBC 01/16- repeat blood cultures ordered. CT A/P obtained due to abd pain-diffuse. Vanc added for MRSA coverage. WBC slowly decreased to 19k. Improvement in mentation. Palliative care re-involved for further GOC discussion.     Interval History: Much improved mental status today.  Patient is able to converse what happened last week- when discussing hospice and what this means, he became silent and did not ask any further questions. When asking the date- he  "was incorrect.     Objective:     Vital Signs (Most Recent):  Temp: 97.3 °F (36.3 °C) (01/17/24 1557)  Pulse: 70 (01/17/24 1557)  Resp: 18 (01/17/24 1557)  BP: (!) 110/56 (01/17/24 1557)  SpO2: 95 % (01/17/24 1557) Vital Signs (24h Range):  Temp:  [97.3 °F (36.3 °C)-98.6 °F (37 °C)] 97.3 °F (36.3 °C)  Pulse:  [65-84] 70  Resp:  [16-18] 18  SpO2:  [92 %-98 %] 95 %  BP: (110-123)/(56-60) 110/56     Weight: 80.7 kg (178 lb)  Body mass index is 24.83 kg/m².    Intake/Output Summary (Last 24 hours) at 1/17/2024 1635  Last data filed at 1/17/2024 0926  Gross per 24 hour   Intake 868.1 ml   Output 400 ml   Net 468.1 ml      Physical Exam  Gen: in NAD, appears stated age, appears chronically ill   Neuro: awake, oriented to self alone, not oriented to location, time or situation- when asked the year, he says "2002", when asked the month, he says "28"  HEENT: NTNC, EOMI, PERRL, MMM, muddy sclera  CVS: RRR, sys ejection murmur, no rubs or gallops; S1/S2 auscultated with no S3 or S4; capillary refill < 2 sec  Resp: right basilar rales; no belabored breathing or accessory muscle use appreciated   Abd: BS+ in all 4 quadrants; NTND, soft to palpation; no organomegaly appreciated   Extrem: pulses full, equal, and regular over all 4 extremities; no UE or LE edema    Significant Labs: All pertinent labs within the past 24 hours have been reviewed.  CBC:   Recent Labs   Lab 01/16/24  0401 01/16/24  0957 01/16/24  1814 01/17/24  0642   WBC 21.11* 20.47*  --  19.74*   HGB 7.7* 7.8* 8.3* 7.5*   HCT 25.4* 25.4* 25.8* 24.7*    373  --  429     CMP:   Recent Labs   Lab 01/16/24  0401 01/16/24  0727 01/17/24  0642     --  142   K 3.4*  --  3.3*     --  110   CO2 21*  --  23   GLU 44* 79 79   BUN 45*  --  47*   CREATININE 3.4*  --  3.8*   CALCIUM 8.8  --  8.5*   PROT 6.5  --  6.3   ALBUMIN 1.8*  --  1.8*   BILITOT 0.5  --  0.5   ALKPHOS 119  --  103   AST 16  --  17   ALT 5*  --  6*   ANIONGAP 13  --  9     Cardiac Markers: " "No results for input(s): "CKMB", "MYOGLOBIN", "BNP", "TROPISTAT" in the last 48 hours.    Significant Imaging: I have reviewed all pertinent imaging results/findings within the past 24 hours.    US of Rt Neck:  FINDINGS:  No fluid collection in the soft tissues of the right-side of neck at site of former central line.  Right-sided PICC line is in place.  Echogenic material noted within the right internal jugular vein, which is partially compressible.     Impression:     1. No fluid collection.  2. Nonocclusive thrombus within the right internal jugular vein.    Assessment/Plan:      * Acute renal failure superimposed on stage 3b chronic kidney disease  - Admitted with ELIZABETH on CKD  - Initial  with sCr 7.5; improving with dialysis  - Nephrology consulted, started on HD for clearance  - Still making some urine  - Per nephro- no need to arrange outpatient HD at this time, will need repeat renal function panel 3 days after discharge and close nephro follow-up- patient and POA updated with regards to plan   - sCr worsening today- giving LR at 100mL/hr x5hrs    Leukocytosis  - worsening leukocytosis today  - unknown etiology- patient does have ESBL UTI, but adequately treated w/IV erta  - CT AP w/"Mildly distended gallbladder with probable mild wall thickening.  Small intraluminal gallbladder stone.  Cholecystitis cannot be excluded.  Consider dedicated ultrasound for further evaluation. Mild ill-defined patchy opacification of the lung bases greater on the right mildly increased from prior may reflect atelectasis or infectious/inflammatory process."  - Bcx 01/16- NGTD  - continue vanc and erta  - if no change in WBC tomorrow and patient still desiring full care, then can plan to re-engage ID  - elevated procal   - CXR w/possible RLL consolidation ?      Thrombosis of right internal jugular vein  - US of neck obtained w/some purulence appreciated from suture of trialysis line- no fluid collection  - found to have Rt " IJ thrombus  - Continue eliquis 10mg BID for 7d, then plan to decrease to 5mg BID       UTI (urinary tract infection)  - s/p 7d of ertapenem for ESBL E coli  - Repeat UA 01/12 concerning for recurrent UTI - Ucx w/ESBL proteus mirabilis >100,000- can plan for 10d course of IV erta    Irritant contact dermatitis due to fecal, urinary or dual incontinence  - wound care following       Torsades de pointes  - h/o torsades    Complete heart block  - Patient with complete heart block, previous EKG with first degree AV block and LBBB  - Patient on metoprolol and amiodarone as an outpatient  - HR in the 30s on admission  - EP consulted, thought it could be 2/2 uremia/ELIZABETH/infection; held off on TVP initially  - Stepped down to  with plans to see how he improved with dialysis  - However went into torsades on 12/31  - TVP placed by IC; s/p Micra insertion with EP on 1/5    Osteomyelitis of foot  - admitted 11/27 for BL wounds- OM of BL calcaneus and 5th metatarsal- found to be MRSA +iv  - s/p bone biopsy and debridement via podiatriy  - on 6wks of IV dapto- EOT 01/10/24    Advanced care planning/counseling discussion  - palliative following  - palliative re-engaged today  - further GOC discussions with POA and patient       Acute encephalopathy  - improved, likely 2/2 uremic encephalopathy and UTI on admission  - some delirium today- possibility of hospital acquired?   - Delirium precautions  - Proteus mirabilis UTI- on IV erta   - Bcx 01/14- NGTD   - Bcx 01/16- NGTD  - CTH, CT A/P reviewed  - improved mentation today  - continue erta and vanc while having ongoing GOC discussions    Bradycardia  - see CHB    Severe malnutrition  Nutrition consulted. Most recent weight and BMI pending    Measurements:  Wt Readings from Last 1 Encounters:   12/29/23 80.7 kg (178 lb)   Body mass index is 24.83 kg/m².    Patient will been screened and assessed by RD.    Malnutrition Type:  Context: chronic illness  Level: severe    Malnutrition  Characteristic Summary:  Weight Loss (Malnutrition): other (see comments) (15% x 3 months)  Energy Intake (Malnutrition): less than 75% for greater than or equal to 3 months  Subcutaneous Fat (Malnutrition): mild depletion  Muscle Mass (Malnutrition): moderate depletion    Interventions/Recommendations (treatment strategy):  1. Continue Soft + Bite Sized diet 2. Continue ONS for optimization of calorie intake 3. RD following      Paroxysmal atrial flutter  - stop heparin gtt  - continue eliquis for treatment of VTE- 10mg BID for 7d and then 5mg BID    Severe aortic valve stenosis  - tte/ 12/29  Summary         Left Ventricle: The left ventricle is normal in size. Ventricular mass is normal. Normal wall thickness. There is concentric remodeling. Septal motion is consistent with bundle branch block. There is normal systolic function with a visually estimated ejection fraction of 55 - 60%. Grade II diastolic dysfunction.    Right Ventricle: Normal right ventricular cavity size. Systolic function is normal.    Left Atrium: Left atrium is severely dilated.    Aortic Valve: There is moderate aortic valve sclerosis. Moderately restricted motion. There is severe stenosis. Aortic valve area by VTI is 0.86 cm². Aortic valve peak velocity is 4.12 m/s. Mean gradient is 43 mmHg. The dimensionless index is 0.22. There is mild to moderate aortic regurgitation.    Mitral Valve: There is bileaflet sclerosis. There is mild annular dilation present.    Tricuspid Valve: There is mild to moderate regurgitation.    Pulmonary Artery: The estimated pulmonary artery systolic pressure is 72 mmHg.    IVC/SVC: Intermediate venous pressure at 8 mmHg.    - outpatient f/u with interventional cardiology      Hypokalemia  Patient has hypokalemia which is Acute and currently controlled. Most recent potassium levels reviewed-   Lab Results   Component Value Date    K 3.3 (L) 01/17/2024   . Will continue potassium replacement per protocol and recheck  repeat levels after replacement completed.     GERD (gastroesophageal reflux disease)  - continue PPI      Anemia  - H:H stable, has required about 4u of pRBC during hospitalization  - multifactorial- reported h/o melena- would need EGD/colon, but has not been stable- will need outpatient f/u if patient does not decide to enroll in hospice;also with acute renal failure- also contributing to anemia  - H:H slight decrease today- no obvious source of blood loss other than recurrent blood draws- also setting of acute renal failure on CKD    Type 2 diabetes mellitus with kidney complication, with long-term current use of insulin  Patient's FSGs are controlled on current medication regimen.  Last A1c reviewed-   Lab Results   Component Value Date    HGBA1C 5.5 09/20/2023     Most recent fingerstick glucose reviewed-   Recent Labs   Lab 01/16/24  1859 01/17/24  0822 01/17/24  1105 01/17/24  1601   POCTGLUCOSE 114* 89 95 92       Current correctional scale  Low  Maintain anti-hyperglycemic dose as follows-   Antihyperglycemics (From admission, onward)      Start     Stop Route Frequency Ordered    12/28/23 1818  insulin aspart U-100 pen 0-5 Units         -- SubQ Before meals & nightly PRN 12/28/23 1719          Hold Oral hypoglycemics while patient is in the hospital.    Dyslipidemia  - Cont home statin      Epigastric abdominal pain  - improved  - concern for consideration of constipation  - RUQ US ordered        VTE Risk Mitigation (From admission, onward)           Ordered     apixaban tablet 10 mg  2 times daily         01/13/24 1846     heparin (porcine) 2,000 Units in sodium chloride 0.9% 1,000 mL  As needed (PRN)         01/05/24 1025     heparin infusion 1,000 units/500 ml in 0.9% NaCl (on sterile field)  As needed (PRN)         01/05/24 1024     heparin (porcine) injection 1,000 Units  As needed (PRN)         12/30/23 1056     IP VTE HIGH RISK PATIENT  Once         12/28/23 1719     Place sequential compression  device  Until discontinued         12/28/23 1719                    Discharge Planning   ELY: 1/18/2024     Code Status: DNR   Is the patient medically ready for discharge?: No    Reason for patient still in hospital (select all that apply): Patient trending condition  Discharge Plan A: Return to nursing home                    Gabbi Small MD  Department of Hospital Medicine   [facility] - Cardiology Stepdown

## 2024-01-17 NOTE — ASSESSMENT & PLAN NOTE
Patient has hypokalemia which is Acute and currently controlled. Most recent potassium levels reviewed-   Lab Results   Component Value Date    K 3.3 (L) 01/17/2024   . Will continue potassium replacement per protocol and recheck repeat levels after replacement completed.

## 2024-01-17 NOTE — ASSESSMENT & PLAN NOTE
- worsening leukocytosis today  - unknown etiology- patient does have ESBL UTI, but adequately treated w/IV erta  - CT a/p ordered  - repeat blood cultures  - ordered procal  - begin vanc for MRSA coverage  - CXR w/possible RLL consolidation ?

## 2024-01-17 NOTE — ASSESSMENT & PLAN NOTE
- palliative following  - further GOC discussion with Aleena ALBERTS- if Chato continues to worsen tomorrow, then can plan to transition to NH w/hospice

## 2024-01-17 NOTE — ASSESSMENT & PLAN NOTE
- Admitted with ELIZABETH on CKD  - Initial  with sCr 7.5; improving with dialysis  - Nephrology consulted, started on HD for clearance  - Still making some urine  - Per nephro- no need to arrange outpatient HD at this time, will need repeat renal function panel 3 days after discharge and close nephro follow-up- patient and POA updated with regards to plan   - sCr worsening today- giving LR at 100mL/hr x5hrs

## 2024-01-17 NOTE — SUBJECTIVE & OBJECTIVE
Interval History: Chart reviewed including 24h medication use. Patient resting comfortably in bed, no family at bedside during visit. He is somnolent but arousable, difficult to understand speech mostly disorganized and incoherent.     Medications:  Continuous Infusions:   sodium chloride 0.9% Stopped (01/05/24 1455)    lactated ringers 100 mL/hr at 01/17/24 1522     Scheduled Meds:   acetaminophen  1,000 mg Oral Q8H    allopurinoL  50 mg Oral Daily    apixaban  10 mg Oral BID    atorvastatin  80 mg Oral Daily    ertapenem (INVANZ) IVPB  500 mg Intravenous Q24H    famotidine  20 mg Oral Daily    folic acid  1 mg Oral Daily    LIDOcaine  1 patch Transdermal Q24H    NIFEdipine  30 mg Oral Daily     PRN Meds:sodium chloride 0.9%, benzonatate, dextrose 10%, dextrose 10%, glucagon (human recombinant), glucose, glucose, heparin (porcine) 2,000 Units in sodium chloride 0.9% 1,000 mL, heparin (porcine), heparin (porcine), insulin aspart U-100, LIDOcaine HCL 20 mg/ml (2%), magnesium oxide, naloxone, senna-docusate 8.6-50 mg, sodium chloride 0.9%, sodium chloride 0.9%, sodium chloride 0.9%, Pharmacy to dose Vancomycin consult **AND** vancomycin - pharmacy to dose    Objective:     Vital Signs (Most Recent):  Temp: 97.3 °F (36.3 °C) (01/17/24 1557)  Pulse: 70 (01/17/24 1557)  Resp: 18 (01/17/24 1557)  BP: (!) 110/56 (01/17/24 1557)  SpO2: 95 % (01/17/24 1557) Vital Signs (24h Range):  Temp:  [97.3 °F (36.3 °C)-98.6 °F (37 °C)] 97.3 °F (36.3 °C)  Pulse:  [65-84] 70  Resp:  [16-18] 18  SpO2:  [92 %-96 %] 95 %  BP: (110-123)/(56-60) 110/56     Weight: 80.7 kg (178 lb)  Body mass index is 24.83 kg/m².       Physical Exam  Vitals and nursing note reviewed.   Constitutional:       General: He is not in acute distress.     Appearance: He is ill-appearing. He is not toxic-appearing.   HENT:      Mouth/Throat:      Mouth: Mucous membranes are moist.   Eyes:      General: No scleral icterus.     Extraocular Movements: Extraocular  movements intact.   Neck:      Comments:  trialysis right IJ  Pulmonary:      Effort: Pulmonary effort is normal. No respiratory distress.   Abdominal:      General: Abdomen is flat.      Palpations: Abdomen is soft.   Skin:     General: Skin is warm and dry.   Neurological:      Mental Status: He is alert.      Comments: Incoherent at times but answers some simple questions              Advance Care Planning   Advance Directives:   Do Not Resuscitate Status: Yes    Medical Power of : Yes      Decision Making:  Family answered questions  Goals of Care: Discussed ongoing care plan with patient's MIROSLAVA Abraham via telephone. Shared my worry that his recurrent infections and worsening mental status may be a sign that he cannot survive long-term outside the hospital. Revisited previous conversations during which patient expressed his wishes about end of life care outside the hospital. Leigh recognizes that continued hospital care may not be in line with patient's values and that hospice enrollment may be appropriate. Requests that we speak with patient at bedside in the coming days to determine next steps          CBC:   Recent Labs   Lab 01/17/24  0642   WBC 19.74*   HGB 7.5*   HCT 24.7*   MCV 87        BMP:  Recent Labs   Lab 01/17/24  0642   GLU 79      K 3.3*      CO2 23   BUN 47*   CREATININE 3.8*   CALCIUM 8.5*   MG 2.2     LFT:  Lab Results   Component Value Date    AST 17 01/17/2024    ALKPHOS 103 01/17/2024    BILITOT 0.5 01/17/2024     Albumin:   Albumin   Date Value Ref Range Status   01/17/2024 1.8 (L) 3.5 - 5.2 g/dL Final     Protein:   Total Protein   Date Value Ref Range Status   01/17/2024 6.3 6.0 - 8.4 g/dL Final     Lactic acid:   Lab Results   Component Value Date    LACTATE 0.8 12/30/2023    LACTATE 0.8 11/27/2023       Reviewed CBC with elevated WBC, CMP with stable/slightly improved renal function

## 2024-01-17 NOTE — SUBJECTIVE & OBJECTIVE
Interval History: Scr 3.8 from 3.4 yesterday. 24 . Appears dry on exam.     Review of patient's allergies indicates:  No Known Allergies  Current Facility-Administered Medications   Medication Frequency    0.9%  NaCl infusion Continuous    0.9%  NaCl infusion PRN    acetaminophen tablet 1,000 mg Q8H    allopurinol split tablet 50 mg Daily    apixaban tablet 10 mg BID    atorvastatin tablet 80 mg Daily    benzonatate capsule 100 mg TID PRN    dextrose 10% bolus 125 mL 125 mL PRN    dextrose 10% bolus 250 mL 250 mL PRN    ertapenem (INVANZ) 500 mg in sodium chloride 0.9% 100 mL IVPB Q24H    famotidine tablet 20 mg Daily    folic acid tablet 1 mg Daily    glucagon (human recombinant) injection 1 mg PRN    glucose chewable tablet 16 g PRN    glucose chewable tablet 24 g PRN    heparin (porcine) 2,000 Units in sodium chloride 0.9% 1,000 mL PRN    heparin (porcine) injection 1,000 Units PRN    heparin infusion 1,000 units/500 ml in 0.9% NaCl (on sterile field) PRN    insulin aspart U-100 pen 0-5 Units QID (AC + HS) PRN    LIDOcaine 5 % patch 1 patch Q24H    LIDOcaine HCL 20 mg/ml (2%) injection PRN    magnesium oxide tablet 800 mg PRN    naloxone 0.4 mg/mL injection 0.02 mg PRN    NIFEdipine 24 hr tablet 30 mg Daily    senna-docusate 8.6-50 mg per tablet 2 tablet BID PRN    sodium chloride 0.9% bolus 250 mL 250 mL PRN    sodium chloride 0.9% flush 10 mL Q12H PRN    sodium chloride 0.9% flush 10 mL PRN    vancomycin - pharmacy to dose pharmacy to manage frequency       Objective:     Vital Signs (Most Recent):  Temp: 97.3 °F (36.3 °C) (01/17/24 1105)  Pulse: 65 (01/17/24 1118)  Resp: 18 (01/17/24 1105)  BP: (!) 119/59 (01/17/24 1105)  SpO2: 96 % (01/17/24 1105) Vital Signs (24h Range):  Temp:  [97.3 °F (36.3 °C)-98.6 °F (37 °C)] 97.3 °F (36.3 °C)  Pulse:  [65-98] 65  Resp:  [16-18] 18  SpO2:  [92 %-98 %] 96 %  BP: (114-123)/(56-60) 119/59     Weight: 80.7 kg (178 lb) (12/29/23 0703)  Body mass index is 24.83  kg/m².  Body surface area is 2.01 meters squared.    I/O last 3 completed shifts:  In: 1150.1 [P.O.:1020; I.V.:43.9; IV Piggyback:86.2]  Out: 870 [Urine:870]     Physical Exam  Vitals and nursing note reviewed.   Constitutional:       General: He is not in acute distress.     Appearance: He is ill-appearing. He is not toxic-appearing.      Interventions: Nasal cannula in place.   HENT:      Head: Normocephalic.      Nose: Nose normal.      Mouth/Throat:      Mouth: Mucous membranes are dry.      Pharynx: No oropharyngeal exudate.   Cardiovascular:      Rate and Rhythm: Normal rate.   Abdominal:      General: Abdomen is flat.   Musculoskeletal:      Cervical back: Normal range of motion. No rigidity.      Right lower leg: No edema.      Left lower leg: No edema.   Lymphadenopathy:      Cervical: No cervical adenopathy.   Skin:     General: Skin is warm and dry.   Neurological:      Mental Status: He is easily aroused.   Psychiatric:         Behavior: Behavior is cooperative.          Significant Labs:  CBC:   Recent Labs   Lab 01/17/24  0642   WBC 19.74*   RBC 2.85*   HGB 7.5*   HCT 24.7*      MCV 87   MCH 26.3*   MCHC 30.4*     CMP:   Recent Labs   Lab 01/17/24  0642   GLU 79   CALCIUM 8.5*   ALBUMIN 1.8*   PROT 6.3      K 3.3*   CO2 23      BUN 47*   CREATININE 3.8*   ALKPHOS 103   ALT 6*   AST 17   BILITOT 0.5     All labs within the past 24 hours have been reviewed.

## 2024-01-17 NOTE — ASSESSMENT & PLAN NOTE
- improved, likely 2/2 uremic encephalopathy and UTI on admission  - some delirium today- possibility of hospital acquired?   - Delirium precautions  - Proteus mirabilis UTI- on IV erta   - Bcx 01/14- NGTD   - Bcx 01/16- NGTD  - CT, CT A/P reviewed  - improved mentation today  - continue erta and vanc while having ongoing GOC discussions

## 2024-01-17 NOTE — ASSESSMENT & PLAN NOTE
Patient's FSGs are controlled on current medication regimen.  Last A1c reviewed-   Lab Results   Component Value Date    HGBA1C 5.5 09/20/2023     Most recent fingerstick glucose reviewed-   Recent Labs   Lab 01/16/24  1859 01/17/24  0822 01/17/24  1105 01/17/24  1601   POCTGLUCOSE 114* 89 95 92       Current correctional scale  Low  Maintain anti-hyperglycemic dose as follows-   Antihyperglycemics (From admission, onward)    Start     Stop Route Frequency Ordered    12/28/23 1818  insulin aspart U-100 pen 0-5 Units         -- SubQ Before meals & nightly PRN 12/28/23 1719        Hold Oral hypoglycemics while patient is in the hospital.

## 2024-01-17 NOTE — ASSESSMENT & PLAN NOTE
Nutrition consulted. Most recent weight and BMI pending    Measurements:  Wt Readings from Last 1 Encounters:   12/29/23 80.7 kg (178 lb)   Body mass index is 24.83 kg/m².    Patient will been screened and assessed by RD.    Malnutrition Type:  Context: chronic illness  Level: severe    Malnutrition Characteristic Summary:  Weight Loss (Malnutrition): other (see comments) (15% x 3 months)  Energy Intake (Malnutrition): less than 75% for greater than or equal to 3 months  Subcutaneous Fat (Malnutrition): mild depletion  Muscle Mass (Malnutrition): moderate depletion    Interventions/Recommendations (treatment strategy):  1. Continue Soft + Bite Sized diet 2. Continue ONS for optimization of calorie intake 3. RD following

## 2024-01-17 NOTE — PROGRESS NOTES
Pharmacokinetic Initial Assessment: IV Vancomycin    Assessment/Plan:    Initiate intravenous vancomycin with loading dose of 1250 mg once with subsequent doses when random concentrations are less than 20 mcg/mL  Desired empiric serum trough concentration is 10 to 20 mcg/mL  Draw vancomycin random level on 1/17 at 2100.  Pharmacy will continue to follow and monitor vancomycin.      Please contact pharmacy at extension [phone] with any questions regarding this assessment.     Thank you for the consult,   Suyapa Lorenzo       Patient brief summary:  Chato Bowie is a 73 y.o. male initiated on antimicrobial therapy with IV Vancomycin for treatment of suspected sepsis    Drug Allergies:   Review of patient's allergies indicates:  No Known Allergies    Actual Body Weight:   80.7 kg    Renal Function:   Estimated Creatinine Clearance: 20.6 mL/min (A) (based on SCr of 3.4 mg/dL (H)).    Dialysis Method (if applicable):  Previously on HD but none now with improving sr cr, continue to monitor for new plans    CBC (last 72 hours):  Recent Labs   Lab Result Units 01/14/24  0335 01/15/24  0436 01/16/24  0401 01/16/24  0957 01/16/24  1814   WBC K/uL 9.07 12.44 21.11* 20.47*  --    Hemoglobin g/dL 7.2* 8.7* 7.7* 7.8* 8.3*   Hematocrit % 23.1* 28.1* 25.4* 25.4* 25.8*   Platelets K/uL 360 421 385 373  --    Gran % % 70.8 87.8* 86.0* 84.8*  --    Lymph % % 12.8* 3.6* 3.7* 4.9*  --    Mono % % 11.0 7.3 9.4 9.3  --    Eosinophil % % 3.5 0.2 0.0 0.1  --    Basophil % % 1.2 0.5 0.2 0.3  --    Differential Method  Automated Automated Automated Automated  --        Metabolic Panel (last 72 hours):  Recent Labs   Lab Result Units 01/14/24  0335 01/15/24  0436 01/16/24  0401 01/16/24  0727   Sodium mmol/L 138 141 143  --    Potassium mmol/L 3.2* 3.6 3.4*  --    Chloride mmol/L 107 108 109  --    CO2 mmol/L 22* 20* 21*  --    Glucose mg/dL 53* 69* 44* 79   BUN mg/dL 49* 47* 45*  --    Creatinine mg/dL 4.2* 3.7* 3.4*  --    Albumin g/dL 1.8*  "2.1* 1.8*  --    Total Bilirubin mg/dL 0.4 0.4 0.5  --    Alkaline Phosphatase U/L 86 105 119  --    AST U/L 15 19 16  --    ALT U/L 5* 6* 5*  --    Magnesium mg/dL 1.9 2.3 2.1  --    Phosphorus mg/dL 4.1 4.1 3.8  --        Drug levels (last 3 results):  No results for input(s): "VANCOMYCINRA", "VANCORANDOM", "VANCOMYCINPE", "VANCOPEAK", "VANCOMYCINTR", "VANCOTROUGH" in the last 72 hours.    Microbiologic Results:  Microbiology Results (last 7 days)       Procedure Component Value Units Date/Time    Blood culture [[id number]] Collected: 01/16/24 0958    Order Status: Completed Specimen: Blood from Peripheral, Left Arm Updated: 01/16/24 1715     Blood Culture, Routine No Growth to date    Blood culture [[id number]] Collected: 01/16/24 0957    Order Status: Completed Specimen: Blood Updated: 01/16/24 1715     Blood Culture, Routine No Growth to date    Urine culture [[id number]]  (Abnormal)  (Susceptibility) Collected: 01/12/24 2100    Order Status: Completed Specimen: Urine Updated: 01/16/24 1014     Urine Culture, Routine PROTEUS MIRABILIS ESBL  >100,000 cfu/ml        PROTEUS MIRABILIS  > 100,000 cfu/ml      Blood culture [[id number]] Collected: 01/14/24 0419    Order Status: Completed Specimen: Blood Updated: 01/16/24 0812     Blood Culture, Routine No Growth to date      No Growth to date      No Growth to date    Narrative:      Collection has been rescheduled by [email or web address] at 01/13/2024 19:44 Reason:   Verbally informed Nurse Pat patient refused and she said he is in   and out so try again about 9 pm  Collection has been rescheduled by RO1 at 01/13/2024 23:44 Reason:   Patient Refused/Nurse Lisa is aware  Collection has been rescheduled by [email or web address] at 01/13/2024 19:44 Reason:   Verbally informed Nurse Pat patient refused and she said he is in   and out so try again about 9 pm  Collection has been rescheduled by RO1 at 01/13/2024 23:44 Reason:   Patient Refused/Nurse Lisa is aware    Blood culture [[id number]] " Collected: 01/14/24 0420    Order Status: Completed Specimen: Blood from Antecubital, Left Arm Updated: 01/16/24 0812     Blood Culture, Routine No Growth to date      No Growth to date      No Growth to date    Narrative:      Collection has been rescheduled by [email or web address] at 01/13/2024 19:44 Reason:   Verbally informed Nurse Pat patient refused and she said he is in   and out so try again about 9 pm  Collection has been rescheduled by RO1 at 01/13/2024 23:44 Reason:   Patient Refused/Nurse Lisa is aware  Collection has been rescheduled by [email or web address] at 01/13/2024 19:44 Reason:   Verbally informed Nurse Pat patient refused and she said he is in   and out so try again about 9 pm  Collection has been rescheduled by RO1 at 01/13/2024 23:44 Reason:   Patient Refused/Nurse Lisa is aware    Blood culture [[id number]]     Order Status: Canceled Specimen: Blood     Blood culture [[id number]]     Order Status: Canceled Specimen: Blood     Blood culture [[id number]]     Order Status: Canceled Specimen: Blood     Blood culture [[id number]]     Order Status: Canceled Specimen: Blood     Urine culture [[id number]]     Order Status: Completed Specimen: Urine, Clean Catch

## 2024-01-17 NOTE — ASSESSMENT & PLAN NOTE
Patient has hypokalemia which is Acute and currently controlled. Most recent potassium levels reviewed-   Lab Results   Component Value Date    K 3.4 (L) 01/16/2024   . Will continue potassium replacement per protocol and recheck repeat levels after replacement completed.

## 2024-01-17 NOTE — PROGRESS NOTES
[facility] - [facility] Step[facility]  Palliative Medicine  Progress Note    Patient Name: Chato Bowie  MRN: [id number]  Admission Date: 12/28/2023  Hospital Length of Stay: 20 days  Code Status: DNR   Attending Provider: Gabbi Small MD  Consulting Provider: Jose Duval MD  Primary Care Physician: Irlanda Valenzuela -  Principal Problem:Acute renal failure superimposed on stage 3b chronic kidney disease    Patient information was obtained from patient, relative(s), past medical records, and primary team.      Assessment/Plan:     Oncology  Leukocytosis  11/16 patient with worsening mental status and leukocytosis to 21 concerning for recurrent infection of unknown source, continued on ertapenem and Vanc started    -continue abx for now  -will continue to discuss beneficence of antimicrobial therapy with patient and NOK given recurrent infections and overall failure to thrive    Palliative Care  Palliative care encounter  See ACP 1/3-1/17     Insight/goals of care- fair, understands limited nature of his life expectancy. Initially discussed implications of not having permanent pacemaker placed, which would likely result in very limited life expectancy which did not seem acceptable to patient or his niece. Subsequently with conversations regarding other potential limits we might place on patient's care as he continues to decline including HD. Patient now clearly understanding pros/cons of continuing to pursue HD     Social support- Niece is MPOA, patient in nursing home prior to admit, unclear what level of support at home      Spiritual- did not assess     Symptom management- pain related to chronic LE wounds, pain well-controlled with current meds        Recommendations  -DNR, full support  -Leigh Gaffney is MPOA, no secondary decision-makers (sister is living but with dementia)  -will continue to engage patient and MPOA in conversations around how to best align care given patient's continued decline in  hospital  -continue oxycodone 10mg q4hr PRN for pain, hydromorphone 0.5mg IV q6hr PRN for breakthrough/wound care          I will follow-up with patient. Please contact us if you have any additional questions.    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 Underwent HD x3, with improvement in mental status. EP consulted to consider pacemaker placement. Palliative care consulted to assist with goals of care.         Interval History: Chart reviewed including 24h medication use. Patient resting comfortably in bed, no family at bedside during visit. He is somnolent but arousable, difficult to understand speech mostly disorganized and incoherent.     Medications:  Continuous Infusions:   sodium chloride 0.9% Stopped (01/05/24 1455)    lactated ringers 100 mL/hr at 01/17/24 1522     Scheduled Meds:   acetaminophen  1,000 mg Oral Q8H    allopurinoL  50 mg Oral Daily    apixaban  10 mg Oral BID    atorvastatin  80 mg Oral Daily    ertapenem (INVANZ) IVPB  500 mg Intravenous Q24H    famotidine  20 mg Oral Daily    folic acid  1 mg Oral Daily    LIDOcaine  1 patch Transdermal Q24H    NIFEdipine  30 mg Oral Daily     PRN Meds:sodium chloride 0.9%, benzonatate, dextrose 10%, dextrose 10%, glucagon (human recombinant), glucose, glucose, heparin (porcine) 2,000 Units in sodium chloride 0.9% 1,000 mL, heparin (porcine), heparin (porcine), insulin aspart U-100, LIDOcaine HCL 20  mg/ml (2%), magnesium oxide, naloxone, senna-docusate 8.6-50 mg, sodium chloride 0.9%, sodium chloride 0.9%, sodium chloride 0.9%, Pharmacy to dose Vancomycin consult **AND** vancomycin - pharmacy to dose    Objective:     Vital Signs (Most Recent):  Temp: 97.3 °F (36.3 °C) (01/17/24 1557)  Pulse: 70 (01/17/24 1557)  Resp: 18 (01/17/24 1557)  BP: (!) 110/56 (01/17/24 1557)  SpO2: 95 % (01/17/24 1557) Vital Signs (24h Range):  Temp:  [97.3 °F (36.3 °C)-98.6 °F (37 °C)] 97.3 °F (36.3 °C)  Pulse:  [65-84] 70  Resp:  [16-18] 18  SpO2:  [92 %-96 %] 95 %  BP: (110-123)/(56-60) 110/56     Weight: 80.7 kg (178 lb)  Body mass index is 24.83 kg/m².       Physical Exam  Vitals and nursing note reviewed.   Constitutional:       General: He is not in acute distress.     Appearance: He is ill-appearing. He is not toxic-appearing.   HENT:      Mouth/Throat:      Mouth: Mucous membranes are moist.   Eyes:      General: No scleral icterus.     Extraocular Movements: Extraocular movements intact.   Neck:      Comments:  trialysis right IJ  Pulmonary:      Effort: Pulmonary effort is normal. No respiratory distress.   Abdominal:      General: Abdomen is flat.      Palpations: Abdomen is soft.   Skin:     General: Skin is warm and dry.   Neurological:      Mental Status: He is alert.      Comments: Incoherent at times but answers some simple questions              Advance Care Planning  Advance Directives:   Do Not Resuscitate Status: Yes    Medical Power of : Yes      Decision Making:  Family answered questions  Goals of Care: Discussed ongoing care plan with patient's MIROSLAVA Abraham via telephone. Shared my worry that his recurrent infections and worsening mental status may be a sign that he cannot survive long-term outside the hospital. Revisited previous conversations during which patient expressed his wishes about end of life care outside the hospital. Leigh recognizes that continued hospital care may not be in line with  patient's values and that hospice enrollment may be appropriate. Requests that we speak with patient at bedside in the coming days to determine next steps          CBC:   Recent Labs   Lab 01/17/24  0642   WBC 19.74*   HGB 7.5*   HCT 24.7*   MCV 87        BMP:  Recent Labs   Lab 01/17/24  0642   GLU 79      K 3.3*      CO2 23   BUN 47*   CREATININE 3.8*   CALCIUM 8.5*   MG 2.2     LFT:  Lab Results   Component Value Date    AST 17 01/17/2024    ALKPHOS 103 01/17/2024    BILITOT 0.5 01/17/2024     Albumin:   Albumin   Date Value Ref Range Status   01/17/2024 1.8 (L) 3.5 - 5.2 g/dL Final     Protein:   Total Protein   Date Value Ref Range Status   01/17/2024 6.3 6.0 - 8.4 g/dL Final     Lactic acid:   Lab Results   Component Value Date    LACTATE 0.8 12/30/2023    LACTATE 0.8 11/27/2023       Reviewed CBC with elevated WBC, CMP with stable/slightly improved renal function    In my care of this patient with acute on chronic severe illness with threat to life and/or bodily function, I am recommending goal-concordant care as noted above. I spent a significant amount of time reviewing external records/ recommendations of other providers (hospital medicine), reviewing recent test results (CBC, CMP, urine and blood cultures) and discussed care with other subspecialists involved ()    In addition to above, I spent 18 minutes specifically discussing advance care planning and goals of care with patient's family via telephone.       Jose Duval MD  Palliative Medicine  [facility]

## 2024-01-17 NOTE — ASSESSMENT & PLAN NOTE
- palliative following  - palliative re-engaged today  - further GOC discussions with POA and patient

## 2024-01-18 LAB
ALBUMIN SERPL BCP-MCNC: 1.7 G/DL (ref 3.5–5.2)
ALP SERPL-CCNC: 146 U/L (ref 55–135)
ALT SERPL W/O P-5'-P-CCNC: 8 U/L (ref 10–44)
ANION GAP SERPL CALC-SCNC: 12 MMOL/L (ref 8–16)
AST SERPL-CCNC: 31 U/L (ref 10–40)
BASOPHILS # BLD AUTO: 0.08 K/UL (ref 0–0.2)
BASOPHILS NFR BLD: 0.5 % (ref 0–1.9)
BILIRUB SERPL-MCNC: 0.6 MG/DL (ref 0.1–1)
BUN SERPL-MCNC: 48 MG/DL (ref 8–23)
CALCIUM SERPL-MCNC: 8.4 MG/DL (ref 8.7–10.5)
CHLORIDE SERPL-SCNC: 109 MMOL/L (ref 95–110)
CO2 SERPL-SCNC: 22 MMOL/L (ref 23–29)
CREAT SERPL-MCNC: 3.2 MG/DL (ref 0.5–1.4)
DIFFERENTIAL METHOD BLD: ABNORMAL
EOSINOPHIL # BLD AUTO: 0.2 K/UL (ref 0–0.5)
EOSINOPHIL NFR BLD: 1.6 % (ref 0–8)
ERYTHROCYTE [DISTWIDTH] IN BLOOD BY AUTOMATED COUNT: 18.6 % (ref 11.5–14.5)
EST. GFR  (NO RACE VARIABLE): 19.7 ML/MIN/1.73 M^2
GLUCOSE SERPL-MCNC: 71 MG/DL (ref 70–110)
HCT VFR BLD AUTO: 26.7 % (ref 40–54)
HGB BLD-MCNC: 8.4 G/DL (ref 14–18)
IMM GRANULOCYTES # BLD AUTO: 0.14 K/UL (ref 0–0.04)
IMM GRANULOCYTES NFR BLD AUTO: 0.9 % (ref 0–0.5)
LYMPHOCYTES # BLD AUTO: 0.9 K/UL (ref 1–4.8)
LYMPHOCYTES NFR BLD: 5.6 % (ref 18–48)
MAGNESIUM SERPL-MCNC: 2.1 MG/DL (ref 1.6–2.6)
MCH RBC QN AUTO: 26.5 PG (ref 27–31)
MCHC RBC AUTO-ENTMCNC: 31.5 G/DL (ref 32–36)
MCV RBC AUTO: 84 FL (ref 82–98)
MONOCYTES # BLD AUTO: 1.5 K/UL (ref 0.3–1)
MONOCYTES NFR BLD: 9.5 % (ref 4–15)
NEUTROPHILS # BLD AUTO: 12.7 K/UL (ref 1.8–7.7)
NEUTROPHILS NFR BLD: 81.9 % (ref 38–73)
NRBC BLD-RTO: 0 /100 WBC
PHOSPHATE SERPL-MCNC: 3 MG/DL (ref 2.7–4.5)
PLATELET # BLD AUTO: 400 K/UL (ref 150–450)
PMV BLD AUTO: 9 FL (ref 9.2–12.9)
POCT GLUCOSE: 97 MG/DL (ref 70–110)
POCT GLUCOSE: 98 MG/DL (ref 70–110)
POTASSIUM SERPL-SCNC: 3.3 MMOL/L (ref 3.5–5.1)
PROT SERPL-MCNC: 6.1 G/DL (ref 6–8.4)
RBC # BLD AUTO: 3.17 M/UL (ref 4.6–6.2)
SODIUM SERPL-SCNC: 143 MMOL/L (ref 136–145)
WBC # BLD AUTO: 15.47 K/UL (ref 3.9–12.7)

## 2024-01-18 PROCEDURE — 99499 UNLISTED E&M SERVICE: CPT | Mod: ,,, | Performed by: INTERNAL MEDICINE

## 2024-01-18 PROCEDURE — 84100 ASSAY OF PHOSPHORUS: CPT | Performed by: INTERNAL MEDICINE

## 2024-01-18 PROCEDURE — 36415 COLL VENOUS BLD VENIPUNCTURE: CPT | Performed by: INTERNAL MEDICINE

## 2024-01-18 PROCEDURE — 63600175 PHARM REV CODE 636 W HCPCS: Performed by: STUDENT IN AN ORGANIZED HEALTH CARE EDUCATION/TRAINING PROGRAM

## 2024-01-18 PROCEDURE — 27000207 HC ISOLATION

## 2024-01-18 PROCEDURE — 25000003 PHARM REV CODE 250

## 2024-01-18 PROCEDURE — 80053 COMPREHEN METABOLIC PANEL: CPT | Performed by: INTERNAL MEDICINE

## 2024-01-18 PROCEDURE — 20600001 HC STEP DOWN PRIVATE ROOM

## 2024-01-18 PROCEDURE — 25000003 PHARM REV CODE 250: Performed by: STUDENT IN AN ORGANIZED HEALTH CARE EDUCATION/TRAINING PROGRAM

## 2024-01-18 PROCEDURE — 83735 ASSAY OF MAGNESIUM: CPT | Performed by: INTERNAL MEDICINE

## 2024-01-18 PROCEDURE — 25000003 PHARM REV CODE 250: Performed by: INTERNAL MEDICINE

## 2024-01-18 PROCEDURE — 25000003 PHARM REV CODE 250: Performed by: HOSPITALIST

## 2024-01-18 PROCEDURE — 85025 COMPLETE CBC W/AUTO DIFF WBC: CPT | Performed by: INTERNAL MEDICINE

## 2024-01-18 RX ORDER — POLYETHYLENE GLYCOL 3350 17 G/17G
17 POWDER, FOR SOLUTION ORAL 2 TIMES DAILY
Status: DISCONTINUED | OUTPATIENT
Start: 2024-01-18 | End: 2024-01-19 | Stop reason: HOSPADM

## 2024-01-18 RX ORDER — AMOXICILLIN 250 MG
2 CAPSULE ORAL 2 TIMES DAILY
Status: DISCONTINUED | OUTPATIENT
Start: 2024-01-18 | End: 2024-01-19 | Stop reason: HOSPADM

## 2024-01-18 RX ADMIN — APIXABAN 10 MG: 5 TABLET, FILM COATED ORAL at 10:01

## 2024-01-18 RX ADMIN — NIFEDIPINE 30 MG: 30 TABLET, FILM COATED, EXTENDED RELEASE ORAL at 09:01

## 2024-01-18 RX ADMIN — APIXABAN 10 MG: 5 TABLET, FILM COATED ORAL at 09:01

## 2024-01-18 RX ADMIN — ACETAMINOPHEN 1000 MG: 500 TABLET ORAL at 05:01

## 2024-01-18 RX ADMIN — ATORVASTATIN CALCIUM 80 MG: 40 TABLET, FILM COATED ORAL at 09:01

## 2024-01-18 RX ADMIN — ERTAPENEM 500 MG: 1 INJECTION INTRAMUSCULAR; INTRAVENOUS at 09:01

## 2024-01-18 RX ADMIN — FOLIC ACID 1 MG: 1 TABLET ORAL at 09:01

## 2024-01-18 RX ADMIN — FAMOTIDINE 20 MG: 20 TABLET, FILM COATED ORAL at 09:01

## 2024-01-18 RX ADMIN — ALLOPURINOL 50 MG: 300 TABLET ORAL at 09:01

## 2024-01-18 NOTE — PROGRESS NOTES
[facility] Hospital Medicine  Progress Note    Primary Team: [facility] HOSP MED C  Admit Date: 12/28/2023    Subjective:      HPI:  73yoM w/PMHx of T2DM, HTN, HLD, CHF, CAD, HLD, CKD 3b, alcohol use disorder, and multiple thyroid nodules presented from [facility] with RLQ pain, fatigue, bradycardia ~30's, weakness, and lab abnormalities including Cr 7.1 and . 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  On interview patient lethargic and disoriented, history obtained via chart review.     In ED pt AF, bradycardic ~40's, RR in the mid 20's, BP ~110/50, satting 100% on 2L NC. Labs significant for Hb 7.2, ANC 8.3, Na 132, Cl 87, HCO3 23, AG 22, , Cr 7.5, phos 8.3, Albumin 1.9, AST 74, , Lipase 146. UA with 2+ protein, 2+ occult blood, 3+ leukocytes, >100WBC and many bacteria. EKG showed wide QRS with LBBB, vent rate 45, QT/QTc 652/563. CXR showed mild retraction of R-sided PICC into mid SVC. CT A&P in progress. In ED jackson placed, started on CTX, given fluids, nephrology consulted. Admitting to [facility] for further management.      Overview/Hospital Course:  Admitted for CHB which EP thought to be 2/2 uremia/ELIZABETH and infection. Has not required pacing and is HDS. Recommended holding AV iker agents. BUN and sCr newly elevated, seen by nephrology who started dialysis for clearance. Tolerated dialysis with plans to further dialyze. ID recommended erta x 7 days for ESBL EColi UTI. Continued daptomycin for his BL heel OM.  C/b delirium for which CT head which was unremarkable. Stepped down to  but came back to CCU after he went into torsades on 12/31. CPR was initiated and pt regained pulse and consciousness; bradycardic post event with HR ~50. Electrolytes replaced. TVP placed in CCU; spoke with MPOA who confirmed a DNR status but that  she would want a pacemaker placed. EP consulted for PPM insertion. Had another episode of torsades (~ 2 mins) that became CHB and then was paced to NSR. TVP placed. Palliative care consulted for GOC/ACP. Pt more alerted and oriented, states he would like to proceed with the procedure, Micra pacemaker placed on 1/5. Bleeding from groin site with BPs in 80/50s that recovered with 1 L NS bolus. Stepped down to hospital medicine 1/5. Received 2 unit prbc's 1/5-1/6 with stable H/H since. Nephrology feels that patient will need long-term dialysis so Interventional Nephro planning line placement 1/11.  Heparin gtt in lieu of Eliquis prior to procedure.      Palliative care with ongoing GOC discussion for TDC vs. Home w/hospice. On further review with nephrology and patient's overall renal function, he was having good UOP and stabilization of renal function. Ok to hold off on TDC placement and outpatient HD arrangement. Stopped heparin gtt 01/12 and resumed home eliquis. Trialysis catheter removed 01/13- noted to have some purulence- small amount- at suture site. US of Rt neck- Rt IJ clot appreciated. Eliquis increased to 10mg BID. Repeat Ucx +iv for proteus mirabilis ESBL- planning for 10d of erta. He had uptrending WBC 01/16- repeat blood cultures NGTD. CT A/P obtained due to abd pain-diffuse; unable to rule out cholecystitis. RUQ u/s ordered with similar findings; patient refused HIDA scan. Vanc added for MRSA coverage. WBC improving. mprovement in mentation. Palliative care re-involved for further GOC discussion.     Interval History:  RUQ u/s equivocal; patient refused HIDA scan. Reports generalized abdominal pain; constipation seen on imaging - start scheduled bowel regimen. Palliative continuing GOC discussion with POA.     ROS:  As per HPI  General: no fever, no chills, no weight loss, no fatigue  Cardiovascular: no chest pain, no orthopnea, no dyspnea  Respiratory: no cough, no wheezes, no SOB  All other systems  reviewed & are negative.     Past Medical History:   Diagnosis Date    Acute congestive heart failure 3/20/2020    Alcohol abuse     Arthritis     Asthma attack 11/24/2021    Coronary artery disease     Diabetes mellitus     Hyperlipemia     Hypertension     Multiple thyroid nodules 6/14/2023    Rhabdomyolysis      Past Surgical History:   Procedure Laterality Date    ECHOCARDIOGRAM,TRANSESOPHAGEAL N/A 9/21/2023    Procedure: Transesophageal echo (MARIA) intra-procedure log documentation;  Surgeon: Luisana Rodríguez MD;  Location: [facility] CATH LAB;  Service: Cardiology;  Laterality: N/A;    EYE SURGERY      IMPLANTATION OF LEADLESS PACEMAKER N/A 1/5/2024    Procedure: INSERTION, CARDIAC PACEMAKER, LEADLESS;  Surgeon: Karl Marin MD;  Location: [facility] EP LAB;  Service: Cardiology;  Laterality: N/A;  CHB, [place]less PPM (Micra), MDT, NAZ, SK, CICU [id number]    IRRIGATION AND DEBRIDEMENT Bilateral 12/1/2023    Procedure: IRRIGATION AND DEBRIDEMENT;  Surgeon: Jenna Gutiérrez DPM;  Location: [facility] OR;  Service: Podiatry;  Laterality: Bilateral;  Request antibiotic beads    TRANSESOPHAGEAL ECHOCARDIOGRAM WITH POSSIBLE CARDIOVERSION (MARIA W/ POSS CARDIOVERSION) N/A 1/5/2023    Procedure: Transesophageal echo (MARIA) intra-procedure log documentation;  Surgeon: Indra Foote MD;  Location: [facility] CATH LAB;  Service: Cardiology;  Laterality: N/A;    TRANSESOPHAGEAL ECHOCARDIOGRAPHY N/A 9/21/2023    Procedure: ECHOCARDIOGRAM, TRANSESOPHAGEAL;  Surgeon: Luisana Rodríguez MD;  Location: [facility] CATH LAB;  Service: Cardiology;  Laterality: N/A;    TREATMENT OF CARDIAC ARRHYTHMIA N/A 12/7/2020    Procedure: Cardioversion or Defibrillation;  Surgeon: Indra Foote MD;  Location: [facility] CATH LAB;  Service: Cardiology;  Laterality: N/A;    TREATMENT OF CARDIAC ARRHYTHMIA N/A 12/30/2020    Procedure: Cardioversion or Defibrillation;  Surgeon: Tyrone Steen MD;  Location: [facility] CATH LAB;  Service: Cardiology;  Laterality: N/A;    TREATMENT  OF CARDIAC ARRHYTHMIA N/A 2023    Procedure: Cardioversion or Defibrillation;  Surgeon: Indra Foote MD;  Location: [facility] CATH LAB;  Service: Cardiology;  Laterality: N/A;    VASCULAR SURGERY           Objective:   Last 24 Hour Vital Signs:  BP  Min: 110/56  Max: 131/62  Temp  Av.4 °F (36.3 °C)  Min: 97 °F (36.1 °C)  Max: 97.8 °F (36.6 °C)  Pulse  Av  Min: 66  Max: 81  Resp  Av.5  Min: 18  Max: 20  SpO2  Av.5 %  Min: 94 %  Max: 97 %  I/O last 3 completed shifts:  In: 118 [P.O.:118]  Out: 700 [Urine:700]    Physical Examination:  GEN: AAOx3, NAD  HEENT: NCAT, MMM, PERRL, EOMI, oropharynx clear  CV: RRR, no m/r, no S3/S4  RESP: CTAB, no wheezes/crackles, no increased WOB  ABD: soft, normoactive BS, no organomegaly, mild distension, diffusely tender to palpation, unable to elicit Julian sign  EXTR: no c/c, intact distal pulses x 4, mild pitting edema  NEURO: PERRL, EOMI, moving all four extremities, intact sensation to light touch, no focal deficits  SKIN: no rashes, lesions, or color changes  PSYCH: normal affect    Laboratory:  I have reviewed all pertinent lab results/findings within the past 24 hours.    Radiology:  I have reviewed all pertinent imaging results/findings within the past 24 hours.    Current Medications:     Infusions:   sodium chloride 0.9% 10 mL/hr at 24        Scheduled:   acetaminophen  1,000 mg Oral Q8H    allopurinoL  50 mg Oral Daily    apixaban  10 mg Oral BID    atorvastatin  80 mg Oral Daily    ertapenem (INVANZ) IVPB  500 mg Intravenous Q24H    famotidine  20 mg Oral Daily    folic acid  1 mg Oral Daily    LIDOcaine  1 patch Transdermal Q24H    NIFEdipine  30 mg Oral Daily        PRN:  sodium chloride 0.9%, benzonatate, dextrose 10%, dextrose 10%, glucagon (human recombinant), glucose, glucose, heparin (porcine) 2,000 Units in sodium chloride 0.9% 1,000 mL, heparin (porcine), heparin (porcine), insulin aspart U-100, LIDOcaine HCL 20 mg/ml (2%),  "magnesium oxide, naloxone, senna-docusate 8.6-50 mg, sodium chloride 0.9%, sodium chloride 0.9%, sodium chloride 0.9%, Pharmacy to dose Vancomycin consult **AND** vancomycin - pharmacy to dose    Prior records reviewed.       Assessment/Plan:     Chato Bowie is a 73 y.o.male with    Acute renal failure superimposed on stage 3b chronic kidney disease  - Admitted with ELIZABETH on CKD  - Initial  with sCr 7.5; improving with dialysis  - Nephrology consulted, started on HD for clearance  - Still making some urine  - Per nephro- no need to arrange outpatient HD at this time, will need repeat renal function panel 3 days after discharge and close nephro follow-up- patient and POA updated with regards to plan   - Cr improved after LR at 100mL/hr x5hrs per Nephro recs. Appears mildly overloaded on exam; will avoid further IVFs     Leukocytosis  Healthcare associated pneumonia  - worsening leukocytosis today  - unknown etiology- patient does have ESBL UTI, but adequately treated w/IV erta  - CT AP w/"Mildly distended gallbladder with probable mild wall thickening.  Small intraluminal gallbladder stone.  Cholecystitis cannot be excluded.  Consider dedicated ultrasound for further evaluation. Mild ill-defined patchy opacification of the lung bases greater on the right mildly increased from prior may reflect atelectasis or infectious/inflammatory process."  - Bcx 01/16- NGTD  - continue vanc and erta  - elevated procal   - CXR w/ possible RLL consolidation. Possibly associated healthcare associated pneumonia - transition to PO doxy with continued erta for UTI treatment on discharge     Thrombosis of right internal jugular vein  - US of neck obtained w/some purulence appreciated from suture of trialysis line- no fluid collection  - found to have Rt IJ thrombus  - Continue eliquis 10mg BID for 7d, then plan to decrease to 5mg BID      UTI (urinary tract infection)  - s/p 7d of ertapenem for ESBL E coli  - Repeat UA/Ucx w/ESBL " proteus mirabilis; plan for 10d course of IV erta     Irritant contact dermatitis due to fecal, urinary or dual incontinence  - wound care following      Torsades de pointes  - h/o torsades     Complete heart block  - Patient with complete heart block, previous EKG with first degree AV block and LBBB  - Patient on metoprolol and amiodarone as an outpatient  - HR in the 30s on admission  - EP consulted, thought it could be 2/2 uremia/ELIZABETH/infection; held off on TVP initially  - Stepped down to  with plans to see how he improved with dialysis  - However went into torsades on 12/31  - TVP placed by IC; s/p Micra insertion with EP on 1/5     Osteomyelitis of foot  - admitted 11/27 for BL wounds- OM of BL calcaneus and 5th metatarsal- found to be MRSA +iv  - s/p bone biopsy and debridement via podiatriy  - on 6wks of IV dapto, ended 01/10/24     Advanced care planning/counseling discussion  - palliative following  - further GOC discussions with POA and patient      Acute encephalopathy  - improved, likely 2/2 uremic encephalopathy and UTI on admission  - some delirium today- possibility of hospital acquired?   - Delirium precautions  - improved with initiation of abx for UTI     Severe malnutrition  Nutrition consulted. Most recent weight and BMI pending     Measurements:      Wt Readings from Last 1 Encounters:   12/29/23 80.7 kg (178 lb)   Body mass index is 24.83 kg/m².     Patient will been screened and assessed by RD.     Malnutrition Type:  Context: chronic illness  Level: severe     Malnutrition Characteristic Summary:  Weight Loss (Malnutrition): other (see comments) (15% x 3 months)  Energy Intake (Malnutrition): less than 75% for greater than or equal to 3 months  Subcutaneous Fat (Malnutrition): mild depletion  Muscle Mass (Malnutrition): moderate depletion     Interventions/Recommendations (treatment strategy):  1. Continue Soft + Bite Sized diet 2. Continue ONS for optimization of calorie intake 3. RD  following        Paroxysmal atrial flutter  - stop heparin gtt  - continue eliquis for treatment of VTE- 10mg BID for 7d and then 5mg BID     Severe aortic valve stenosis  - tte/ 12/29  Summary          Left Ventricle: The left ventricle is normal in size. Ventricular mass is normal. Normal wall thickness. There is concentric remodeling. Septal motion is consistent with bundle branch block. There is normal systolic function with a visually estimated ejection fraction of 55 - 60%. Grade II diastolic dysfunction.    Right Ventricle: Normal right ventricular cavity size. Systolic function is normal.    Left Atrium: Left atrium is severely dilated.    Aortic Valve: There is moderate aortic valve sclerosis. Moderately restricted motion. There is severe stenosis. Aortic valve area by VTI is 0.86 cm². Aortic valve peak velocity is 4.12 m/s. Mean gradient is 43 mmHg. The dimensionless index is 0.22. There is mild to moderate aortic regurgitation.    Mitral Valve: There is bileaflet sclerosis. There is mild annular dilation present.    Tricuspid Valve: There is mild to moderate regurgitation.    Pulmonary Artery: The estimated pulmonary artery systolic pressure is 72 mmHg.    IVC/SVC: Intermediate venous pressure at 8 mmHg.     - outpatient f/u with interventional cardiology        Hypokalemia  Patient has hypokalemia which is Acute and currently controlled. Most recent potassium levels reviewed-   Will continue potassium replacement per protocol and recheck repeat levels after replacement completed.      GERD (gastroesophageal reflux disease)  - continue PPI     Anemia  - H:H stable, has required about 4u of pRBC during hospitalization  - multifactorial- reported h/o melena- would need EGD/colon, but has not been stable- will need outpatient f/u if patient does not decide to enroll in hospice; also with acute renal failure- also contributing to anemia     Type 2 diabetes mellitus with kidney complication, with long-term  current use of insulin  Patient's FSGs are controlled on current medication regimen.  Last A1c reviewed-         Lab Results   Component Value Date     HGBA1C 5.5 09/20/2023     Current correctional scale  Low  Maintain anti-hyperglycemic dose as follows-   Antihyperglycemics (From admission, onward)        Start     Stop Route Frequency Ordered     12/28/23 1818   insulin aspart U-100 pen 0-5 Units         -- SubQ Before meals & nightly PRN 12/28/23 1719             Hold Oral hypoglycemics while patient is in the hospital.     Dyslipidemia  - Cont home statin    Abdominal pain  - generalized  - concern for consideration of constipation as seen on imaging - start scheduled bowel regimen  - RUQ US ordered and unable to r/o acute cholecystitis. Pt refusing HIDA scan.        Marilee Rivas MD  Hospital Medicine Staff  [facility]

## 2024-01-18 NOTE — PROGRESS NOTES
Pharmacokinetic Assessment Follow Up: IV Vancomycin    Vancomycin serum concentration assessment(s):    The random level was drawn correctly and can be used to guide therapy at this time. The measurement is within the desired definitive target range of 10 to 20 mcg/mL.  - The random level was drawn ~24 hours after previous dose and resulted at 14.6.    Vancomycin Regimen Plan:    Will re-dose once now with vancomycin 1000 mg.  - Re-dose when the random level is less than 20 mcg/mL, next level to be drawn at 2230 on 1/18.  - Mr. Bowie has ARF on CKD3b. Previously received HD, but has not had any sessions since 1/3. Nephro following.  - Will continue pulse dosing in the setting of ARF on CKD3.    Drug levels (last 3 results):  Recent Labs   Lab Result Units 01/17/24  2121   Vancomycin, Random ug/mL 14.6       Pharmacy will continue to follow and monitor vancomycin.    Please contact pharmacy at extension [phone] for questions regarding this assessment.    Thank you for the consult,   Chandni Beltrán       Patient brief summary:  Chato Bowie is a 73 y.o. male initiated on antimicrobial therapy with IV Vancomycin for treatment of sepsis    The patient's current regimen is pulse dosing in the setting of ARF on CKD3b    Actual Body Weight:   80.7 kg    Renal Function:   Estimated Creatinine Clearance: 18.4 mL/min (A) (based on SCr of 3.8 mg/dL (H)).     Dialysis Method (if applicable):  N/A

## 2024-01-19 VITALS
WEIGHT: 178 LBS | HEART RATE: 75 BPM | TEMPERATURE: 98 F | SYSTOLIC BLOOD PRESSURE: 119 MMHG | OXYGEN SATURATION: 96 % | DIASTOLIC BLOOD PRESSURE: 58 MMHG | RESPIRATION RATE: 18 BRPM | HEIGHT: 71 IN | BODY MASS INDEX: 24.92 KG/M2

## 2024-01-19 LAB
ACID FAST MOD KINY STN SPEC: NORMAL
ALBUMIN SERPL BCP-MCNC: 1.7 G/DL (ref 3.5–5.2)
ALP SERPL-CCNC: 150 U/L (ref 55–135)
ALT SERPL W/O P-5'-P-CCNC: 8 U/L (ref 10–44)
ANION GAP SERPL CALC-SCNC: 8 MMOL/L (ref 8–16)
AST SERPL-CCNC: 34 U/L (ref 10–40)
BACTERIA BLD CULT: NORMAL
BACTERIA BLD CULT: NORMAL
BASOPHILS # BLD AUTO: 0.05 K/UL (ref 0–0.2)
BASOPHILS NFR BLD: 0.3 % (ref 0–1.9)
BILIRUB SERPL-MCNC: 0.6 MG/DL (ref 0.1–1)
BUN SERPL-MCNC: 48 MG/DL (ref 8–23)
CALCIUM SERPL-MCNC: 8.5 MG/DL (ref 8.7–10.5)
CHLORIDE SERPL-SCNC: 112 MMOL/L (ref 95–110)
CO2 SERPL-SCNC: 24 MMOL/L (ref 23–29)
CREAT SERPL-MCNC: 3.1 MG/DL (ref 0.5–1.4)
DIFFERENTIAL METHOD BLD: ABNORMAL
EOSINOPHIL # BLD AUTO: 0.2 K/UL (ref 0–0.5)
EOSINOPHIL NFR BLD: 1.3 % (ref 0–8)
ERYTHROCYTE [DISTWIDTH] IN BLOOD BY AUTOMATED COUNT: 18.6 % (ref 11.5–14.5)
EST. GFR  (NO RACE VARIABLE): 20.4 ML/MIN/1.73 M^2
GLUCOSE SERPL-MCNC: 72 MG/DL (ref 70–110)
HCT VFR BLD AUTO: 24.8 % (ref 40–54)
HGB BLD-MCNC: 7.4 G/DL (ref 14–18)
IMM GRANULOCYTES # BLD AUTO: 0.15 K/UL (ref 0–0.04)
IMM GRANULOCYTES NFR BLD AUTO: 1 % (ref 0–0.5)
LYMPHOCYTES # BLD AUTO: 1.1 K/UL (ref 1–4.8)
LYMPHOCYTES NFR BLD: 7 % (ref 18–48)
MAGNESIUM SERPL-MCNC: 2.1 MG/DL (ref 1.6–2.6)
MCH RBC QN AUTO: 25.9 PG (ref 27–31)
MCHC RBC AUTO-ENTMCNC: 29.8 G/DL (ref 32–36)
MCV RBC AUTO: 87 FL (ref 82–98)
MONOCYTES # BLD AUTO: 1.3 K/UL (ref 0.3–1)
MONOCYTES NFR BLD: 8.3 % (ref 4–15)
MYCOBACTERIUM SPEC QL CULT: NORMAL
NEUTROPHILS # BLD AUTO: 12.3 K/UL (ref 1.8–7.7)
NEUTROPHILS NFR BLD: 82.1 % (ref 38–73)
NRBC BLD-RTO: 0 /100 WBC
PHOSPHATE SERPL-MCNC: 3.4 MG/DL (ref 2.7–4.5)
PLATELET # BLD AUTO: 397 K/UL (ref 150–450)
PMV BLD AUTO: 8 FL (ref 9.2–12.9)
POCT GLUCOSE: 100 MG/DL (ref 70–110)
POTASSIUM SERPL-SCNC: 3 MMOL/L (ref 3.5–5.1)
PROT SERPL-MCNC: 6.3 G/DL (ref 6–8.4)
RBC # BLD AUTO: 2.86 M/UL (ref 4.6–6.2)
SODIUM SERPL-SCNC: 144 MMOL/L (ref 136–145)
VANCOMYCIN SERPL-MCNC: 21.9 UG/ML
WBC # BLD AUTO: 14.99 K/UL (ref 3.9–12.7)

## 2024-01-19 PROCEDURE — 99497 ADVNCD CARE PLAN 30 MIN: CPT | Mod: ,,, | Performed by: STUDENT IN AN ORGANIZED HEALTH CARE EDUCATION/TRAINING PROGRAM

## 2024-01-19 PROCEDURE — 25000003 PHARM REV CODE 250: Performed by: INTERNAL MEDICINE

## 2024-01-19 PROCEDURE — 85025 COMPLETE CBC W/AUTO DIFF WBC: CPT | Performed by: INTERNAL MEDICINE

## 2024-01-19 PROCEDURE — 80202 ASSAY OF VANCOMYCIN: CPT | Performed by: HOSPITALIST

## 2024-01-19 PROCEDURE — 83735 ASSAY OF MAGNESIUM: CPT | Performed by: INTERNAL MEDICINE

## 2024-01-19 PROCEDURE — 25000003 PHARM REV CODE 250: Performed by: STUDENT IN AN ORGANIZED HEALTH CARE EDUCATION/TRAINING PROGRAM

## 2024-01-19 PROCEDURE — 25000003 PHARM REV CODE 250: Performed by: HOSPITALIST

## 2024-01-19 PROCEDURE — 63600175 PHARM REV CODE 636 W HCPCS: Performed by: STUDENT IN AN ORGANIZED HEALTH CARE EDUCATION/TRAINING PROGRAM

## 2024-01-19 PROCEDURE — 25000003 PHARM REV CODE 250

## 2024-01-19 PROCEDURE — 99233 SBSQ HOSP IP/OBS HIGH 50: CPT | Mod: ,,, | Performed by: STUDENT IN AN ORGANIZED HEALTH CARE EDUCATION/TRAINING PROGRAM

## 2024-01-19 PROCEDURE — 80053 COMPREHEN METABOLIC PANEL: CPT | Performed by: INTERNAL MEDICINE

## 2024-01-19 PROCEDURE — 84100 ASSAY OF PHOSPHORUS: CPT | Performed by: INTERNAL MEDICINE

## 2024-01-19 RX ORDER — POLYETHYLENE GLYCOL 3350 17 G/17G
17 POWDER, FOR SOLUTION ORAL 2 TIMES DAILY PRN
Refills: 0
Start: 2024-01-19

## 2024-01-19 RX ORDER — AMOXICILLIN AND CLAVULANATE POTASSIUM 500; 125 MG/1; MG/1
1 TABLET, FILM COATED ORAL 2 TIMES DAILY
Start: 2024-01-19

## 2024-01-19 RX ORDER — DOXYCYCLINE 100 MG/1
100 CAPSULE ORAL EVERY 12 HOURS
Start: 2024-01-19

## 2024-01-19 RX ORDER — FUROSEMIDE 80 MG/1
80 TABLET ORAL 2 TIMES DAILY PRN
Qty: 60 TABLET | Refills: 11
Start: 2024-01-19 | End: 2025-01-18

## 2024-01-19 RX ORDER — ALLOPURINOL 100 MG/1
50 TABLET ORAL DAILY
Start: 2024-01-20

## 2024-01-19 RX ORDER — POTASSIUM CHLORIDE 750 MG/1
30 CAPSULE, EXTENDED RELEASE ORAL ONCE
Status: COMPLETED | OUTPATIENT
Start: 2024-01-19 | End: 2024-01-19

## 2024-01-19 RX ADMIN — ATORVASTATIN CALCIUM 80 MG: 40 TABLET, FILM COATED ORAL at 10:01

## 2024-01-19 RX ADMIN — POTASSIUM CHLORIDE 30 MEQ: 10 CAPSULE, COATED, EXTENDED RELEASE ORAL at 10:01

## 2024-01-19 RX ADMIN — ERTAPENEM 500 MG: 1 INJECTION INTRAMUSCULAR; INTRAVENOUS at 11:01

## 2024-01-19 RX ADMIN — NIFEDIPINE 30 MG: 30 TABLET, FILM COATED, EXTENDED RELEASE ORAL at 10:01

## 2024-01-19 RX ADMIN — FOLIC ACID 1 MG: 1 TABLET ORAL at 10:01

## 2024-01-19 RX ADMIN — ALLOPURINOL 50 MG: 300 TABLET ORAL at 10:01

## 2024-01-19 RX ADMIN — APIXABAN 10 MG: 5 TABLET, FILM COATED ORAL at 10:01

## 2024-01-19 RX ADMIN — FAMOTIDINE 20 MG: 20 TABLET, FILM COATED ORAL at 10:01

## 2024-01-19 NOTE — ASSESSMENT & PLAN NOTE
See ACP 1/3-1/19     Insight/goals of care- fair, understands limited nature of his life expectancy. Initially discussed implications of not having permanent pacemaker placed, which would likely result in very limited life expectancy which did not seem acceptable to patient or his niece. Subsequently with conversations regarding other potential limits we might place on patient's care as he continues to decline including HD. Patient now clearly understanding pros/cons of continuing to pursue HD     Social support- Niece is MPOA, patient in nursing home prior to admit, unclear what level of support at home      Spiritual- did not assess     Symptom management- pain related to chronic LE wounds, pain well-controlled with current meds        Recommendations  -DNR, full support  -Leigh Gaffney is MPOA, no secondary decision-makers (sister is living but with dementia)  -d/c with hospice at nursing home

## 2024-01-19 NOTE — PLAN OF CARE
NURSING HOME ORDERS    01/19/2024  [facility]  [facility]  [address]  [place] [address]  Dept: [phone]  Loc: [phone]     Admit to Nursing Home:  [facility] WITH HOSPICE    Diagnoses:  Active Hospital Problems    Diagnosis  POA    *Acute renal failure superimposed on stage 3b chronic kidney disease [N17.9, N18.32]  Yes    Leukocytosis [D72.829]  No    Thrombosis of right internal jugular vein [I82.C11]  No    Irritant contact dermatitis due to fecal, urinary or dual incontinence [L24.A2]  Yes    Palliative care encounter [Z51.5]  Not Applicable     -      Torsades de pointes [I47.21]  No    Complete heart block [I44.2]  Yes    Osteomyelitis of foot [M86.9]  Yes    Advanced care planning/counseling discussion [Z71.89]  Not Applicable    Acute encephalopathy [G93.40]  Yes    Bradycardia [R00.1]  Yes    Severe malnutrition [E43]  Yes    UTI (urinary tract infection) [N39.0]  Yes    Paroxysmal atrial flutter [I48.92]  Yes     Chronic    Severe aortic valve stenosis [I35.0]  Yes    Hypokalemia [E87.6]  Yes    Type 2 diabetes mellitus with kidney complication, with long-term current use of insulin [E11.29, Z79.4]  Not Applicable     Chronic    GERD (gastroesophageal reflux disease) [K21.9]  Yes     Chronic    Dyslipidemia [E78.5]  Yes    Anemia [D64.9]  Yes    Epigastric abdominal pain [R10.13]  Yes      Resolved Hospital Problems    Diagnosis Date Resolved POA    Acute kidney injury superimposed on CKD [N17.9, N18.9] 12/31/2023 Yes    Multiple thyroid nodules [E04.2] 01/05/2024 Yes    Essential hypertension [I10] 01/03/2024 Yes       Patient is homebound due to:  Acute renal failure superimposed on stage 3b chronic kidney disease    Allergies:Review of patient's allergies indicates:  No Known Allergies    Vitals:  Routine    Diet: cardiac diet and renal diet    Activities:   Activity as tolerated    Goals of Care Treatment Preferences:  Code Status:  DNR    Health care agent: Leigh Evans  Health care agent number: [phone]          What is most important right now is to focus on patient wishes she has had expressed to him in the past of being a DNR.  Accordingly, we have decided that the best plan to meet the patient's goals includes continuing with treatment.          Nursing Precautions:  Aspiration , Fall, and Pressure ulcer prevention    Consults:     CONSULT HOSPICE    Miscellaneous Care: Routine Skin for Bedridden Patients:  Apply moisture barrier cream to all  Wound Care: yes:  Bedside nurse to perform wound care to left and right lateral foot and left heel:        Bedside nurse to perform wound care to right heel:   Cleanse wound with normal saline   Pat dry.   Apply a collagen hydrogel (Woun'Dres) to wound bed   Cover with a foam border (Mepilex   Apply this every two days.      Bedside nurse to perform wound care to buttocks:   Cleanse wound with soap and water   Pat dry.   Apply a foam border (Mepilex) wound bed    Apply this weekly.               Medications: Discontinue all previous medication orders, if any. See new list below.     Medication List        START taking these medications      amoxicillin-clavulanate 500-125mg 500-125 mg Tab  Commonly known as: AUGMENTIN  Take 1 tablet (500 mg total) by mouth 2 (two) times daily. END DATE 1/24/24     doxycycline 100 MG Cap  Commonly known as: VIBRAMYCIN  Take 1 capsule (100 mg total) by mouth every 12 (twelve) hours. END DATE 1/24/24     polyethylene glycol 17 gram Pwpk  Commonly known as: GLYCOLAX  Take 17 g by mouth 2 (two) times daily as needed for Constipation.            CHANGE how you take these medications      allopurinoL 100 MG tablet  Commonly known as: ZYLOPRIM  Take 0.5 tablets (50 mg total) by mouth once daily.  Start taking on: January 20, 2024  What changed: how much to take     furosemide 80 MG tablet  Commonly known as: LASIX  Take 1 tablet (80 mg total) by mouth 2 (two) times  daily as needed (swelling).  What changed:   when to take this  reasons to take this            CONTINUE taking these medications      albuterol 90 mcg/actuation inhaler  Commonly known as: PROVENTIL/VENTOLIN HFA  Inhale 2 puffs into the lungs every 6 (six) hours as needed for Wheezing or Shortness of Breath.     ELIQUIS 5 mg Tab  Generic drug: apixaban  Take 5 mg by mouth 2 (two) times daily.     ferrous sulfate 325 mg (65 mg iron) Tab tablet  Commonly known as: FEOSOL  Take 325 mg by mouth once daily.     folic acid 1 MG tablet  Commonly known as: FOLVITE  Take 1 mg by mouth once daily.     insulin aspart U-100 100 unit/mL injection       ONE DAILY MULTIVITAMIN per tablet  Generic drug: multivitamin  Take 1 tablet by mouth once daily.     rosuvastatin 40 MG Tab  Commonly known as: CRESTOR  Take 40 mg by mouth every evening.     senna-docusate 8.6-50 mg 8.6-50 mg per tablet  Commonly known as: PERICOLACE  Take 2 tablets by mouth 2 (two) times daily as needed for Constipation.     tamsulosin 0.4 mg Cap  Commonly known as: FLOMAX  Take 1 capsule (0.4 mg total) by mouth once daily.     VITAMIN C 500 MG tablet  Generic drug: ascorbic acid (vitamin C)  Take 500 mg by mouth 2 (two) times daily.            STOP taking these medications      amiodarone 200 MG Tab  Commonly known as: PACERONE     gabapentin 100 MG capsule  Commonly known as: NEURONTIN     metOLazone 5 MG tablet  Commonly known as: ZAROXOLYN     metoprolol succinate 25 MG 24 hr tablet  Commonly known as: TOPROL-XL     potassium chloride 10 MEQ Cpsr  Commonly known as: MICRO-K                Immunizations Administered as of 1/19/2024       No immunizations on file.              _________________________________  Marilee Rivas MD  01/19/2024

## 2024-01-19 NOTE — ACP (ADVANCE CARE PLANNING)
Advance Care Planning     Date: 01/19/2024    [facility]  I engaged the family in a voluntary conversation about advance care planning and we specifically addressed what the goals of care would be moving forward, in light of the patient's change in clinical status, specifically progressive decline in health and functional status.  We did specifically address the patient's likely prognosis, which is poor.  We explored the patient's values and preferences for future care.  The family endorses that what is most important right now is to focus on avoiding the hospital, symptom/pain control, quality of life, even if it means sacrificing a little time, and comfort and QOL     Accordingly, we have decided that the best plan to meet the patient's goals includes enrolling in hospice care

## 2024-01-19 NOTE — SUBJECTIVE & OBJECTIVE
Interval History: Chart reviewed including 24h medication use. Patient resting comfortably in bed, somnolent but arousable. No acute complaints      Medications:  Continuous Infusions:   sodium chloride 0.9% 10 mL/hr at 01/17/24 2133     Scheduled Meds:   acetaminophen  1,000 mg Oral Q8H    allopurinoL  50 mg Oral Daily    apixaban  10 mg Oral BID    atorvastatin  80 mg Oral Daily    famotidine  20 mg Oral Daily    folic acid  1 mg Oral Daily    LIDOcaine  1 patch Transdermal Q24H    NIFEdipine  30 mg Oral Daily    polyethylene glycol  17 g Oral BID    senna-docusate 8.6-50 mg  2 tablet Oral BID     PRN Meds:sodium chloride 0.9%, benzonatate, dextrose 10%, dextrose 10%, glucagon (human recombinant), glucose, glucose, heparin (porcine) 2,000 Units in sodium chloride 0.9% 1,000 mL, heparin (porcine), heparin (porcine), insulin aspart U-100, LIDOcaine HCL 20 mg/ml (2%), magnesium oxide, naloxone, sodium chloride 0.9%, sodium chloride 0.9%, sodium chloride 0.9%    Objective:     Vital Signs (Most Recent):  Temp: 97.6 °F (36.4 °C) (01/19/24 1143)  Pulse: 75 (01/19/24 1143)  Resp: 18 (01/19/24 1143)  BP: (!) 119/58 (01/19/24 1143)  SpO2: 96 % (01/19/24 1143) Vital Signs (24h Range):  Temp:  [97.6 °F (36.4 °C)-98.2 °F (36.8 °C)] 97.6 °F (36.4 °C)  Pulse:  [71-80] 75  Resp:  [18] 18  SpO2:  [93 %-96 %] 96 %  BP: (119-121)/(58-60) 119/58     Weight: 80.7 kg (178 lb)  Body mass index is 24.83 kg/m².       Physical Exam  Vitals and nursing note reviewed.   Constitutional:       General: He is not in acute distress.     Appearance: He is ill-appearing. He is not toxic-appearing.   HENT:      Mouth/Throat:      Mouth: Mucous membranes are moist.   Eyes:      General: No scleral icterus.     Extraocular Movements: Extraocular movements intact.   Neck:      Comments:  trialysis right IJ  Pulmonary:      Effort: Pulmonary effort is normal. No respiratory distress.   Abdominal:      General: Abdomen is flat.      Palpations: Abdomen  is soft.   Skin:     General: Skin is warm and dry.   Neurological:      Mental Status: He is alert.      Comments: Incoherent at times but answers some simple questions            Advance Care Planning   Advance Directives:   Medical Power of : Yes    Goals of Care: Discussed care via telephone with patient's niece, Leigh. Reviewed patient's clinical status and previously stated wishes and recommended enrolling with hospice at nursing home on discharge to avoid future rehospitalizations. Leigh agrees with this. Also discuss with patient who is in line with plan         CBC:   Recent Labs   Lab 01/19/24  0811   WBC 14.99*   HGB 7.4*   HCT 24.8*   MCV 87        BMP:  Recent Labs   Lab 01/19/24  0811   GLU 72      K 3.0*   *   CO2 24   BUN 48*   CREATININE 3.1*   CALCIUM 8.5*   MG 2.1     LFT:  Lab Results   Component Value Date    AST 34 01/19/2024    ALKPHOS 150 (H) 01/19/2024    BILITOT 0.6 01/19/2024     Albumin:   Albumin   Date Value Ref Range Status   01/19/2024 1.7 (L) 3.5 - 5.2 g/dL Final     Protein:   Total Protein   Date Value Ref Range Status   01/19/2024 6.3 6.0 - 8.4 g/dL Final     Lactic acid:   Lab Results   Component Value Date    LACTATE 0.8 12/30/2023    LACTATE 0.8 11/27/2023       Reviewed CBC with stable blood counts, CMP with stable renal function

## 2024-01-19 NOTE — NURSING
Attempted to draw labs from PICC line and offer patient water. He told me to get out and turn off the light. Will try again before end of shift.

## 2024-01-19 NOTE — ASSESSMENT & PLAN NOTE
73m with CKD3 presented in complete heart block with ELIZABETH, underwent TVP--> micra as well as had trialysis line placed for iHD. Renal function remains poor but not acutely worsening (BL cr 2-3, now ~5.5). Continues to have -1000cc/day.    Nephrology recommending tunneled dialysis catheter placement and vascular access planning. Given patient's ongoing clinical decline and frailty with 10+ hospital admissions over the past year, now bedbound status (past several months), and poor quality of life, considerable concern should be given that he may not be a good outpatient HD candidate.     -1/19 discussed with jero what end of life from renal failure often looks like  -d/c to NH with hospice

## 2024-01-19 NOTE — PLAN OF CARE
"Patient resting in bed with call light in reach, A&O to person and place, refusing care of any kind. I was able to have him drink apple juice with his eliquis crushed and mixed in. Otherwise, he refused vitals and all other medications. He also refused a lab draw from his PICC line and told me to "Go head" after I attempted to let him know he would not feel a thing. When I tried to proceed he raised his arms as if to hit me and it was then I realized that "Go head" meant get out of his room, not go ahead with the lab draw. I will attempt another lab draw in the morning.  "

## 2024-01-19 NOTE — PLAN OF CARE
01/19/24 1124   Post-Acute Status   Post-Acute Authorization Hospice   Hospice Status Referrals Sent     SW informed by Palliative Care physician Dr Duval that pt's family is now leaning towards hospice.  SW called and spoke with pt's niece/LEXUS Abraham who voiced agreement with plan for pt to return to [facility] with hospice and for SW to reach out to the hospice that is contracted with [facility].  SW called Nonya in admissions at [facility] and informed her of plan, who reported that they contract with [facility].  Referral sent to [facility].      Veena Lozoya LMSW  [facility]  [phone]

## 2024-01-19 NOTE — NURSING
Report called to nurse Live receiving patient to [facility]. Diagnosis and past medical history reviewed. Assessment reviewed. Plan of care discussed. Time allowed for questions, comments or concerns. PICC line double lumen was removed from right upper arm prior to discharge, per ok to do so from receiving nurse Live. Patient also left with personal gray colored blanket and [employer] device with box. Attempted twice to call back nurse to update on her device coming with patient. Patient's rhythm is ventricular pacing noted. Report given to EMS for transportation.

## 2024-01-19 NOTE — CONSULTS
Pharmacokinetic Assessment Follow Up: IV Vancomycin    Vancomycin serum concentration assessment(s):    The random level was drawn correctly and can be used to guide therapy at this time. The measurement is above the desired definitive target range of 15 to 20 mcg/mL.    Vancomycin Regimen Plan:    Re-dose when the random level is less than 20 mcg/mL, next level to be drawn at 1/19 on 1700    Drug levels (last 3 results):  Recent Labs   Lab Result Units 01/17/24  2121 01/19/24  0811   Vancomycin, Random ug/mL 14.6 21.9       Pharmacy will continue to follow and monitor vancomycin.    Please contact pharmacy at extension [phone] for questions regarding this assessment.    Thank you for the consult,   Serina Small       Patient brief summary:  Chato Bowie is a 73 y.o. male initiated on antimicrobial therapy with IV Vancomycin for treatment of sepsis    The patient's current regimen is pulse dosing    Drug Allergies:   Review of patient's allergies indicates:  No Known Allergies    Actual Body Weight:   80.7 kg    Renal Function:   Estimated Creatinine Clearance: 22.6 mL/min (A) (based on SCr of 3.1 mg/dL (H)).,     Dialysis Method (if applicable):  Previously on HD but none now with improving sr cr, continue to monitor for new plans     CBC (last 72 hours):  Recent Labs   Lab Result Units 01/16/24  1814 01/17/24  0642 01/18/24  0413 01/19/24  0811   WBC K/uL  --  19.74* 15.47* 14.99*   Hemoglobin g/dL 8.3* 7.5* 8.4* 7.4*   Hematocrit % 25.8* 24.7* 26.7* 24.8*   Platelets K/uL  --  429 400 397   Gran % %  --  83.5* 81.9* 82.1*   Lymph % %  --  4.8* 5.6* 7.0*   Mono % %  --  9.3 9.5 8.3   Eosinophil % %  --  0.9 1.6 1.3   Basophil % %  --  0.5 0.5 0.3   Differential Method   --  Automated Automated Automated       Metabolic Panel (last 72 hours):  Recent Labs   Lab Result Units 01/17/24  0642 01/18/24  0413 01/19/24  0811   Sodium mmol/L 142 143 144   Potassium mmol/L 3.3* 3.3* 3.0*   Chloride mmol/L 110 109 112*   CO2  mmol/L 23 22* 24   Glucose mg/dL 79 71 72   BUN mg/dL 47* 48* 48*   Creatinine mg/dL 3.8* 3.2* 3.1*   Albumin g/dL 1.8* 1.7* 1.7*   Total Bilirubin mg/dL 0.5 0.6 0.6   Alkaline Phosphatase U/L 103 146* 150*   AST U/L 17 31 34   ALT U/L 6* 8* 8*   Magnesium mg/dL 2.2 2.1 2.1   Phosphorus mg/dL 3.0 3.0 3.4       Vancomycin Administrations:  vancomycin given in the last 96 hours                     vancomycin (VANCOCIN) 1,000 mg in dextrose 5 % (D5W) 250 mL IVPB (Vial-Mate) (mg) 1,000 mg New Bag 01/17/24 2323    vancomycin 1,250 mg in dextrose 5 % (D5W) 250 mL IVPB (Vial-Mate) (mg) 1,250 mg New Bag 01/16/24 2130                    Microbiologic Results:  Microbiology Results (last 7 days)       Procedure Component Value Units Date/Time    Blood culture [[id number]] Collected: 01/14/24 0419    Order Status: Completed Specimen: Blood Updated: 01/19/24 0812     Blood Culture, Routine No growth after 5 days.    Narrative:      Collection has been rescheduled by [email or web address] at 01/13/2024 19:44 Reason:   Verbally informed Nurse Pat patient refused and she said he is in   and out so try again about 9 pm  Collection has been rescheduled by RO1 at 01/13/2024 23:44 Reason:   Patient Refused/Nurse Lisa is aware  Collection has been rescheduled by [email or web address] at 01/13/2024 19:44 Reason:   Verbally informed Nurse Pat patient refused and she said he is in   and out so try again about 9 pm  Collection has been rescheduled by RO1 at 01/13/2024 23:44 Reason:   Patient Refused/Nurse Lisa is aware    Blood culture [[id number]] Collected: 01/14/24 0420    Order Status: Completed Specimen: Blood from Antecubital, Left Arm Updated: 01/19/24 0812     Blood Culture, Routine No growth after 5 days.    Narrative:      Collection has been rescheduled by [email or web address] at 01/13/2024 19:44 Reason:   Verbally informed Nurse Pat patient refused and she said he is in   and out so try again about 9 pm  Collection has been rescheduled by ROCarmela at 01/13/2024 23:44  Reason:   Patient Refused/Nurse Lisa is aware  Collection has been rescheduled by [email or web address] at 01/13/2024 19:44 Reason:   Verbally informed Nurse Pat patient refused and she said he is in   and out so try again about 9 pm  Collection has been rescheduled by RO1 at 01/13/2024 23:44 Reason:   Patient Refused/Nurse Lisa is aware    Blood culture [[id number]] Collected: 01/16/24 0958    Order Status: Completed Specimen: Blood from Peripheral, Left Arm Updated: 01/18/24 1212     Blood Culture, Routine No Growth to date      No Growth to date      No Growth to date    Blood culture [[id number]] Collected: 01/16/24 0957    Order Status: Completed Specimen: Blood Updated: 01/18/24 1212     Blood Culture, Routine No Growth to date      No Growth to date      No Growth to date    Culture, Respiratory with Gram Stain [[id number]]     Order Status: No result Specimen: Respiratory     Urine culture [[id number]]  (Abnormal)  (Susceptibility) Collected: 01/12/24 2100    Order Status: Completed Specimen: Urine Updated: 01/16/24 1014     Urine Culture, Routine PROTEUS MIRABILIS ESBL  >100,000 cfu/ml        PROTEUS MIRABILIS  > 100,000 cfu/ml      Blood culture [[id number]]     Order Status: Canceled Specimen: Blood     Blood culture [[id number]]     Order Status: Canceled Specimen: Blood     Blood culture [[id number]]     Order Status: Canceled Specimen: Blood     Blood culture [[id number]]     Order Status: Canceled Specimen: Blood     Urine culture [[id number]]     Order Status: Completed Specimen: Urine, Clean Catch

## 2024-01-19 NOTE — CONSULTS
VANCOMYCIN DOSING BY PHARMACY DISCONTINUATION NOTE    Chato Bowie is a 73 y.o. male who had been consulted for vancomycin dosing.    The pharmacy consult for vancomycin dosing has been discontinued.     Vancomycin Dosing by Pharmacy Consult will sign-off. Please reconsult if necessary. Thank you for allowing us to participate in this patient's care.       Serina Small, PharmD   [phone]

## 2024-01-19 NOTE — PLAN OF CARE
[facility] - Cardiology Stepdown  Discharge Final Note    Primary Care Provider: Irlanda Valenzuela -    Expected Discharge Date: 1/19/2024    Final Discharge Note (most recent)       Final Note - 01/19/24 1309          Final Note    Assessment Type Final Discharge Note     Anticipated Discharge Disposition Intermediate Care Facility for correction or Supportive Care        Post-Acute Status    Post-Acute Authorization Placement;Hospice     Post-Acute Placement Status Set-up Complete/Auth obtained     Hospice Status Set-up Complete/Auth obtained                 LINCOLN confirmed with Pretty with [facility] that referral was received.  LINCOLN relayed this to Maria Alejandraya in admissions at [facility] who confirmed that they will work with [facility] to get pt admitted and that pt is cleared to return.  Transportation scheduled via stretcher for 3:00pm to return to [facility] (time not guaranteed).  JOYCE Doshi to call report to [phone], room 17A.  JOYCE Doshi was notified of the above.  LINCOLN also called and notified pt's niece/LEXUS Abraham of transfer.  Leigh confirmed that she was contacted by [facility] and is setting up a time to meet with them to sign consents.      Veena Lozoya LMSW  [facility]  [phone]        Important Message from Medicare  Important Message from Medicare regarding Discharge Appeal Rights: Other (comments) (patient going to NH with hospice)     Date IMM was signed: 01/19/24  Time IMM was signed: 2174

## 2024-01-19 NOTE — DISCHARGE SUMMARY
[facility] - Cardiology [facility] Medicine  Discharge Summary      Patient Name: Chato Bowie  MRN: [id number]  RUPESH: [id number]  Patient Class: IP- Inpatient  Admission Date: 12/28/2023  Hospital Length of Stay: 22 days  Discharge Date and Time:  01/19/2024 1:45 PM  Attending Physician: Marilee Rivas MD   Discharging Provider: Marilee Rivas MD  Primary Care [facility] Team: [facility]  Marilee Rivas MD  Primary Care Team: [facility]     HPI:   73yoM w/PMHx of T2DM, HTN, HLD, CHF, CAD, HLD, CKD 3b, alcohol use disorder, and multiple thyroid nodules presented from [facility] with RLQ pain, fatigue, bradycardia ~30's, weakness, and lab abnormalities including Cr 7.1 and . 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  On interview patient lethargic and disoriented, history obtained via chart review.    In ED pt AF, bradycardic ~40's, RR in the mid 20's, BP ~110/50, satting 100% on 2L NC. Labs significant for Hb 7.2, ANC 8.3, Na 132, Cl 87, HCO3 23, AG 22, , Cr 7.5, phos 8.3, Albumin 1.9, AST 74, , Lipase 146. UA with 2+ protein, 2+ occult blood, 3+ leukocytes, >100WBC and many bacteria. EKG showed wide QRS with LBBB, vent rate 45, QT/QTc 652/563. CXR showed mild retraction of R-sided PICC into mid SVC. CT A&P in progress. In ED jackson placed, started on CTX, given fluids, nephrology consulted. Admitting to [facility] for further management.     Procedure(s) (LRB):  Insertion, Catheter, Central Venous, Hemodialysis (N/A)      Hospital Course:   Admitted for CHB which EP thought to be 2/2 uremia/ELIZABETH and infection. Has not required pacing and is HDS. Recommended holding AV iker agents. BUN and sCr newly elevated, seen by nephrology who started dialysis for clearance. Tolerated dialysis with plans to  further dialyze. ID recommended erta x 7 days for ESBL EColi UTI. Continued daptomycin for his BL heel OM.  C/b delirium for which CT head which was unremarkable. Stepped down to  but came back to CCU after he went into torsades on 12/31. CPR was initiated and pt regained pulse and consciousness; bradycardic post event with HR ~50. Electrolytes replaced. TVP placed in CCU; spoke with MPOA who confirmed a DNR status but that she would want a pacemaker placed. EP consulted for PPM insertion. Had another episode of torsades (~ 2 mins) that became CHB and then was paced to NSR. TVP placed. Palliative care consulted for GOC/ACP. Pt more alerted and oriented, states he would like to proceed with the procedure, Micra pacemaker placed on 1/5. Bleeding from groin site with BPs in 80/50s that recovered with 1 L NS bolus. Stepped down to hospital medicine 1/5. Received 2 unit prbc's 1/5-1/6 with stable H/H since. On further review with nephrology and patient's overall renal function, he was having good UOP and progressive improvement of renal function. Ok to hold off on TDC placement and outpatient HD arrangement. Trialysis catheter removed 01/13. US of Rt neck- Rt IJ clot appreciated. Eliquis increased to 10mg BID. Repeat Ucx +iv for proteus mirabilis ESBL- completed 7 d of erta. He had uptrending WBC 01/16- repeat blood cultures NGTD. Vanc added. CT A/P obtained due to abd pain-diffuse; unable to rule out cholecystitis, large amount of stool. RUQ u/s ordered with similar findings; patient refused HIDA scan. WBC improving. Improvement in mentation. Palliative care re-involved for further GOC discussion and POA decided on return to NH with hospice. Abx deescalated to augmentin & doxy.      Goals of Care Treatment Preferences:  Code Status: DNR    Health care agent: Leigh Evans  Health care agent number: [phone]          What is most important right now is to focus on avoiding the hospital, symptom/pain control, quality  of life, even if it means sacrificing a little time, comfort and QOL .  Accordingly, we have decided that the best plan to meet the patient's goals includes enrolling in hospice care.      Consults:   Consults (From admission, onward)          Status Ordering Provider     IP consult to Interventional Nephrology  Once        Provider:  (Not yet assigned)    Completed DAVIN MONTOYA     Inpatient consult to Palliative Care  Once        Provider:  (Not yet assigned)    Completed PRECIOUS BREWER     Inpatient consult to Skin Integrity  Practitioner  Once        Provider:  Paola Alfonso NP    Completed ALESSANDRA MAY     Inpatient consult to Registered Dietitian/Nutritionist  Once        Provider:  (Not yet assigned)    Completed GAVIN MARTINEZ     Inpatient consult to Registered Dietitian/Nutritionist  Once        Provider:  (Not yet assigned)    Completed GAVIN MARTINEZ     Inpatient consult to Infectious Diseases  Once        Provider:  (Not yet assigned)    Completed ALVARO SALES     Inpatient consult to Electrophysiology  Once        Provider:  (Not yet assigned)    Completed PRECIOUS BREWER     Inpatient consult to Registered Dietitian/Nutritionist  Once        Provider:  (Not yet assigned)    Completed RITA SCOTT     Inpatient consult to Nephrology  Once        Provider:  (Not yet assigned)    Completed BIRDIE JOLLEY            No new Assessment & Plan notes have been filed under this hospital service since the last note was generated.  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] 01/13/2024 No    Irritant contact dermatitis due to fecal, urinary or dual incontinence [L24.A2] 01/04/2024 Yes    Palliative care  encounter [Z51.5] 01/03/2024 Not Applicable    Torsades de pointes [I47.21] 01/01/2024 No    Complete heart block [I44.2] 12/29/2023 Yes    Osteomyelitis of foot [M86.9] 11/30/2023 Yes    Advanced care planning/counseling discussion [Z71.89] 09/21/2023 Not Applicable    Acute encephalopathy [G93.40] 07/17/2023 Yes    Bradycardia [R00.1] 01/05/2023 Yes    Severe malnutrition [E43] 05/12/2022 Yes    UTI (urinary tract infection) [N39.0] 09/05/2021 Yes    Paroxysmal atrial flutter [I48.92] 12/07/2020 Yes     Chronic    Severe aortic valve stenosis [I35.0] 11/24/2020 Yes    Hypokalemia [E87.6] 02/14/2019 Yes    Type 2 diabetes mellitus with kidney complication, with long-term current use of insulin [E11.29, Z79.4] 03/10/2017 Not Applicable     Chronic    GERD (gastroesophageal reflux disease) [K21.9] 03/10/2017 Yes     Chronic    Dyslipidemia [E78.5] 03/10/2017 Yes    Anemia [D64.9] 03/10/2017 Yes    Epigastric abdominal pain [R10.13] 03/07/2016 Yes      Problems Resolved During this Admission:    Diagnosis Date Noted Date Resolved POA    Acute kidney injury superimposed on CKD [N17.9, N18.9] 12/28/2023 12/31/2023 Yes    Multiple thyroid nodules [E04.2] 06/14/2023 01/05/2024 Yes    Essential hypertension [I10] 03/10/2017 01/03/2024 Yes       Discharged Condition: stable    Disposition: Another Health Care Inst*    Follow Up:    Patient Instructions:   No discharge procedures on file.    Significant Diagnostic Studies: N/A    Pending Diagnostic Studies:       Procedure Component Value Units Date/Time    Procalcitonin [[id number]] Collected: 01/16/24 0957    Order Status: Sent Lab Status: In process Updated: 01/16/24 0957    Specimen: Blood     Urinalysis [[id number]] Collected: 01/03/24 1501    Order Status: Sent Lab Status: In process Updated: 01/03/24 1506    Specimen: Urine            Medications:  Reconciled Home Medications:      Medication List        START taking these medications      amoxicillin-clavulanate  500-125mg 500-125 mg Tab  Commonly known as: AUGMENTIN  Take 1 tablet (500 mg total) by mouth 2 (two) times daily. END DATE 1/24/24     doxycycline 100 MG Cap  Commonly known as: VIBRAMYCIN  Take 1 capsule (100 mg total) by mouth every 12 (twelve) hours. END DATE 1/24/24     polyethylene glycol 17 gram Pwpk  Commonly known as: GLYCOLAX  Take 17 g by mouth 2 (two) times daily as needed for Constipation.            CHANGE how you take these medications      allopurinoL 100 MG tablet  Commonly known as: ZYLOPRIM  Take 0.5 tablets (50 mg total) by mouth once daily.  Start taking on: January 20, 2024  What changed: how much to take     furosemide 80 MG tablet  Commonly known as: LASIX  Take 1 tablet (80 mg total) by mouth 2 (two) times daily as needed (swelling).  What changed:   when to take this  reasons to take this            CONTINUE taking these medications      albuterol 90 mcg/actuation inhaler  Commonly known as: PROVENTIL/VENTOLIN HFA  Inhale 2 puffs into the lungs every 6 (six) hours as needed for Wheezing or Shortness of Breath.     ELIQUIS 5 mg Tab  Generic drug: apixaban  Take 5 mg by mouth 2 (two) times daily.     ferrous sulfate 325 mg (65 mg iron) Tab tablet  Commonly known as: FEOSOL  Take 325 mg by mouth once daily.     folic acid 1 MG tablet  Commonly known as: FOLVITE  Take 1 mg by mouth once daily.     insulin aspart U-100 100 unit/mL injection  Commonly known as: NovoLOG  Inject into the skin. Per sliding scale     ONE DAILY MULTIVITAMIN per tablet  Generic drug: multivitamin  Take 1 tablet by mouth once daily.     rosuvastatin 40 MG Tab  Commonly known as: CRESTOR  Take 40 mg by mouth every evening.     senna-docusate 8.6-50 mg 8.6-50 mg per tablet  Commonly known as: PERICOLACE  Take 2 tablets by mouth 2 (two) times daily as needed for Constipation.     tamsulosin 0.4 mg Cap  Commonly known as: FLOMAX  Take 1 capsule (0.4 mg total) by mouth once daily.     VITAMIN C 500 MG tablet  Generic drug:  ascorbic acid (vitamin C)  Take 500 mg by mouth 2 (two) times daily.            STOP taking these medications      amiodarone 200 MG Tab  Commonly known as: PACERONE     gabapentin 100 MG capsule  Commonly known as: NEURONTIN     metOLazone 5 MG tablet  Commonly known as: ZAROXOLYN     metoprolol succinate 25 MG 24 hr tablet  Commonly known as: TOPROL-XL     potassium chloride 10 MEQ Cpsr  Commonly known as: MICRO-K              Indwelling Lines/Drains at time of discharge:   Lines/Drains/Airways       Peripherally Inserted Central Catheter Line  Duration             PICC Double Lumen 12/07/23 1457 right brachial 42 days                    Time spent on the discharge of patient: 35 minutes         Marilee Rivas MD  Department of Hospital Medicine  [facility] - Cardiology Stepdown

## 2024-01-19 NOTE — PROGRESS NOTES
[facility] Sepulveda - Cardiology Stepdown  Palliative Medicine  Progress Note    Patient Name: Chato Bowie  MRN: [id number]  Admission Date: 12/28/2023  Hospital Length of Stay: 22 days  Code Status: DNR   Attending Provider: No att. providers found  Consulting Provider: Jose Duval MD  Primary Care Physician: Irlanda Valenzuela -  Principal Problem:Acute renal failure superimposed on stage 3b chronic kidney disease    Patient information was obtained from patient, relative(s), past medical records, and primary team.      Assessment/Plan:     Renal/  * Acute renal failure superimposed on stage 3b chronic kidney disease  73m with CKD3 presented in complete heart block with ELIZABETH, underwent TVP--> micra as well as had trialysis line placed for iHD. Renal function remains poor but not acutely worsening (BL cr 2-3, now ~5.5). Continues to have -1000cc/day.    Nephrology recommending tunneled dialysis catheter placement and vascular access planning. Given patient's ongoing clinical decline and frailty with 10+ hospital admissions over the past year, now bedbound status (past several months), and poor quality of life, considerable concern should be given that he may not be a good outpatient HD candidate.     -1/19 discussed with niece what end of life from renal failure often looks like  -d/c to NH with hospice    Palliative Care  Palliative care encounter  See ACP 1/3-1/19     Insight/goals of care- fair, understands limited nature of his life expectancy. Initially discussed implications of not having permanent pacemaker placed, which would likely result in very limited life expectancy which did not seem acceptable to patient or his niece. Subsequently with conversations regarding other potential limits we might place on patient's care as he continues to decline including HD. Patient now clearly understanding pros/cons of continuing to pursue HD     Social support- Niece is MPOA, patient in nursing home prior to  admit, unclear what level of support at home      Spiritual- did not assess     Symptom management- pain related to chronic LE wounds, pain well-controlled with current meds        Recommendations  -DNR, full support  -LorettaeceLeigh is MPOA, no secondary decision-makers (sister is living but with dementia)  -d/c with hospice at nursing home          I will sign off. Please contact us if you have any additional questions.    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 Underwent HD x3, with improvement in mental status. EP consulted to consider pacemaker placement. Palliative care consulted to assist with goals of care.     Hospital Course:  No notes on file    Interval History: Chart reviewed including 24h medication use. Patient resting comfortably in bed, somnolent but arousable. No acute complaints      Medications:  Continuous Infusions:   sodium chloride 0.9% 10 mL/hr at 01/17/24 2133     Scheduled Meds:   acetaminophen  1,000 mg Oral Q8H    allopurinoL  50 mg Oral Daily    apixaban  10 mg Oral BID    atorvastatin  80 mg Oral Daily    famotidine  20 mg Oral Daily    folic acid  1 mg Oral Daily    LIDOcaine  1 patch Transdermal Q24H    NIFEdipine  30 mg Oral Daily    polyethylene glycol  17 g Oral BID    senna-docusate 8.6-50 mg  2 tablet Oral BID     PRN Meds:sodium chloride 0.9%, benzonatate, dextrose 10%, dextrose 10%, glucagon (human recombinant), glucose,  glucose, heparin (porcine) 2,000 Units in sodium chloride 0.9% 1,000 mL, heparin (porcine), heparin (porcine), insulin aspart U-100, LIDOcaine HCL 20 mg/ml (2%), magnesium oxide, naloxone, sodium chloride 0.9%, sodium chloride 0.9%, sodium chloride 0.9%    Objective:     Vital Signs (Most Recent):  Temp: 97.6 °F (36.4 °C) (01/19/24 1143)  Pulse: 75 (01/19/24 1143)  Resp: 18 (01/19/24 1143)  BP: (!) 119/58 (01/19/24 1143)  SpO2: 96 % (01/19/24 1143) Vital Signs (24h Range):  Temp:  [97.6 °F (36.4 °C)-98.2 °F (36.8 °C)] 97.6 °F (36.4 °C)  Pulse:  [71-80] 75  Resp:  [18] 18  SpO2:  [93 %-96 %] 96 %  BP: (119-121)/(58-60) 119/58     Weight: 80.7 kg (178 lb)  Body mass index is 24.83 kg/m².       Physical Exam  Vitals and nursing note reviewed.   Constitutional:       General: He is not in acute distress.     Appearance: He is ill-appearing. He is not toxic-appearing.   HENT:      Mouth/Throat:      Mouth: Mucous membranes are moist.   Eyes:      General: No scleral icterus.     Extraocular Movements: Extraocular movements intact.   Neck:      Comments:  trialysis right IJ  Pulmonary:      Effort: Pulmonary effort is normal. No respiratory distress.   Abdominal:      General: Abdomen is flat.      Palpations: Abdomen is soft.   Skin:     General: Skin is warm and dry.   Neurological:      Mental Status: He is alert.      Comments: Incoherent at times but answers some simple questions            Advance Care Planning  Advance Directives:   Medical Power of : Yes    Goals of Care: Discussed care via telephone with patient's niece, Leigh. Reviewed patient's clinical status and previously stated wishes and recommended enrolling with hospice at nursing home on discharge to avoid future rehospitalizations. Leigh agrees with this. Also discuss with patient who is in line with plan         CBC:   Recent Labs   Lab 01/19/24  0811   WBC 14.99*   HGB 7.4*   HCT 24.8*   MCV 87        BMP:  Recent Labs   Lab  01/19/24  0811   GLU 72      K 3.0*   *   CO2 24   BUN 48*   CREATININE 3.1*   CALCIUM 8.5*   MG 2.1     LFT:  Lab Results   Component Value Date    AST 34 01/19/2024    ALKPHOS 150 (H) 01/19/2024    BILITOT 0.6 01/19/2024     Albumin:   Albumin   Date Value Ref Range Status   01/19/2024 1.7 (L) 3.5 - 5.2 g/dL Final     Protein:   Total Protein   Date Value Ref Range Status   01/19/2024 6.3 6.0 - 8.4 g/dL Final     Lactic acid:   Lab Results   Component Value Date    LACTATE 0.8 12/30/2023    LACTATE 0.8 11/27/2023       Reviewed CBC with stable blood counts, CMP with stable renal function    In my care of this patient with acute on chronic severe illness with threat to life and/or bodily function, I am recommending goal-concordant care as noted above. I spent a significant amount of time reviewing external records/ recommendations of other providers (), reviewing recent test results (CBC,CMP), and discussed care with other subspecialists involved ()    In addition to above, I spent 20 minutes specifically discussing advance care planning and goals of care with patient at bedside and family via telephone      The above recommendations communicated directly to primary team on 1/19        Jose Duval MD  Palliative Medicine  [facility] - Cardiology [place]

## 2024-01-21 LAB
BACTERIA BLD CULT: NORMAL
BACTERIA BLD CULT: NORMAL

## 2024-01-28 NOTE — ED NOTES
Attempted to call and give report, but nurse informed me that they currently have a code going on right now & she will call me back.   resilient/elastic

## 2024-03-27 NOTE — TELEPHONE ENCOUNTER
Refill Routing Note   Medication(s) are not appropriate for processing by [facility] for the following reason(s):        Responsible provider unclear    [facility] action(s):  Route             Appointments  past 12m or future 3m with PCP    Date Provider   Last Visit   Visit date not found Eleuterio Lopez MD   Next Visit   Visit date not found Eleuterio Lopez MD   ED visits in past 90 days: 0        Note composed:8:26 AM 03/27/2024

## 2024-03-31 RX ORDER — MORPHINE SULFATE 20 MG/ML
SOLUTION ORAL
Qty: 15 ML | Refills: 0 | OUTPATIENT
Start: 2024-03-31

## 2024-04-01 PROBLEM — N17.9 ARF (ACUTE RENAL FAILURE): Status: RESOLVED | Noted: 2017-03-10 | Resolved: 2024-04-01

## 2024-04-01 PROBLEM — N39.0 UTI (URINARY TRACT INFECTION): Status: RESOLVED | Noted: 2021-09-05 | Resolved: 2024-04-01

## 2024-04-08 PROBLEM — N18.32 ACUTE RENAL FAILURE SUPERIMPOSED ON STAGE 3B CHRONIC KIDNEY DISEASE: Status: RESOLVED | Noted: 2020-11-24 | Resolved: 2024-04-08

## 2024-04-08 PROBLEM — N17.9 ACUTE RENAL FAILURE SUPERIMPOSED ON STAGE 3B CHRONIC KIDNEY DISEASE: Status: RESOLVED | Noted: 2020-11-24 | Resolved: 2024-04-08

## 2024-04-09 PROBLEM — I70.244 ATHEROSCLEROSIS OF NATIVE ARTERIES OF LEFT LEG WITH ULCERATION OF HEEL AND MIDFOOT: Status: ACTIVE | Noted: 2024-04-09

## 2024-04-09 PROBLEM — Z95.0 PACEMAKER: Status: ACTIVE | Noted: 2024-04-09

## 2024-04-09 PROBLEM — I70.223 CRITICAL LIMB ISCHEMIA OF BOTH LOWER EXTREMITIES: Status: RESOLVED | Noted: 2023-12-08 | Resolved: 2024-04-09

## 2024-04-10 ENCOUNTER — TELEPHONE (OUTPATIENT)
Dept: ELECTROPHYSIOLOGY | Facility: CLINIC | Age: 74
End: 2024-04-10

## 2024-04-10 NOTE — TELEPHONE ENCOUNTER
Called patient's Healthcare Power of  since patient has no phone number on file. She answered and said to call [facility] to see if he was coming to his appointment. When I called [facility] they notified me that patient is now on Hospice care.

## 2024-04-23 NOTE — ASSESSMENT & PLAN NOTE
"GILBERT NOTE     Problem:  Depression, SI     Behavior:  Pt awake in bed at start of shift. Isolative to room this shift, declines snack. Pt is selectively nonverbal during interaction with this RN. Pt reports his day was \"ok.\" Denies SI, but dismissive when asked about his depression and unable to rate.   Group Participation: Refuses  Appetite/Meals: Declines     Interventions:  1:1 provided with reassurance. No scheduled meds this shift. No PRNs given. Sitter at bedside for safety.    Response:  Pt calm, resting in bed at this time.     Plan:  Will continue to monitor behaviors and effectiveness of interventions while maintaining pt safety.    " Creatine stable for now. BMP reviewed- noted Estimated Creatinine Clearance: 37.1 mL/min (A) (based on SCr of 2.1 mg/dL (H)). according to latest data. Based on current GFR, CKD stage is stage 3 - GFR 30-59.    Monitor UOP and serial BMP and adjust therapy as needed. Renally dose meds.   Avoid nephrotoxic medications and procedures.

## 2024-06-02 NOTE — ASSESSMENT & PLAN NOTE
-Due to HFpEF, CKD, malnutrition, COVID 19 infection and immobility- addressing these issues separately.    02-Jun-2024 Yes

## 2024-07-16 NOTE — NURSING
PER handoff received from JOYCE Mora     Pt resting in bed quietly. NAD noted. No c/o pain.  Fall and safety precautions maintained. Bed alarm activated and audible.. Bed locked in lowest position, with side rails up x2. Call bell and personal items within reach     0 = understands/communicates without difficulty

## 2024-07-22 NOTE — CONSULTS
Your Child's Health  11-14 Year Old Visit      Rogelio Costello  July 22, 2024    Visit Vitals  /62   Pulse 84   Ht 5' 2\" (1.575 m)   Wt 42 kg (92 lb 9.6 oz)   BMI 16.94 kg/m²     Weight: 92.6 lbs      YOUR CHILD'S 11 to 14 YEAR-OLD VISIT    Personal Safety  Violence in the community and schools can impact a child's physical and emotional health. Talk to your child about bullying and emphasize that it should always be reported. Work with schools and administrators to address bullying and intimidation in your child's school. Your child should know they can always call you when they are uncomfortable or concerned that they are getting into a dangerous situation. In dating situations, teens need to know it is always okay to say \"no\" to something they are uncomfortable with and that \"no\" means NO and must be respected. Sadly, youth in this age range are most likely to be targeted for exploitation (human trafficking, prostitution). Tell your child that they should never form a relationship with someone who forbids them to tell their parents about it - these are always dangerous situations.    Smoking and Other Drugs  It's always important to tell your child that you do not want them to smoke or use drugs. When there are family members, friends, or peers who are smoking, discuss with your child how strong the addiction is and how difficult it is to quit. Studies show that youth who are exposed to e-cigarettes are more likely to try them themselves, and using e-cigarettes is a risk factor for smoking regular cigarettes.    Self-Esteem & Emotional Health  A strong sense of self-esteem contributes to good mental health and can help your child succeed in many aspects of life. As your child is becoming more independent, it is still important to spend time together as a family, start discussions about lots of topics (not just about things you disagree on), listen respectfully to your child and answer questions honestly.  Food & Nutrition  Education    Diet Education: Low Salt Diet - Fluid Restriction    Learners: Chato Bowie      Nutrition Education provided with handouts: Low Salt/ Fluid Restrictive Diet      Comments: RD consult for education fluid restriction low salt diet. RD unable to educate pt at time of visit, pt was with other care. Educations attached to pt chart for follow up at a later time.      Thanks!     Katja Pandey, Registration Eligible, Provisional LDN    Make sure you are clear about family rules and expectations regarding their dress, activities, media use, etc.    Success in school also builds self-esteem. Children who do well in school are less likely to be involved in risky behaviors. As school becomes more challenging academically, increasing demands will cause some stress that your child needs to learn to handle in a healthy way. Also, subtle learning or attention problems which may have been overlooked previously may become evident at higher grade levels. Poor school performance could also be a sign of anxiety or depression. Frequent absenteeism is a risk factor for dropping out of school; talk to your doctors or teachers if your child is missing a lot of school.    Involvement with activities that really interest your child is another way to build their self-esteem. Even if they are having some struggles with schoolwork, try to find an opportunity for them to pursue something that really interests them, like music, art, drama or sports.    Preteens and young teens can often be fleming and withdrawn, and this is often normal as long as it does not last long or significantly impact their schoolwork, activities, or relationships. Depression can impact adolescents with typical symptoms being irritability, persistent boredom, poor school performance and withdrawal from activities and relationships. Young teens identifying as lesbian, shabazz, bisexual, transgender, or questioning are particularly at risk for emotional health problems; family acceptance of these young people is one of the strongest factors leading to positive outcomes for them.    Sexuality  While most youth in this age range have not had sexual experiences, your child needs to know that you do not want them to engage in sexual activity and that they can come to you with any questions about sex. Not having sex is the safest way to prevent pregnancy and avoid sexually transmitted infections.  Experimenting with drugs or alcohol will increase your child's risk of making poor decisions, which is another reason to continue to remind them that you do not want them to use drugs or alcohol. Studies show that parents have more influence on teen sexual values and decision-making than peers, media, siblings, teachers, and Yazdanism teachings. But you can't ignore the topic --talk about it! Use what is going on at school, what they are seeing on TV and what is happening with friends to talk about relationships and sex. Discuss how they can respond to pressures to use drugs or engage in sexual activity. If you aren't sure how to talk to your child about sex, find resources at the American Academy of Pediatrics [email or web address] website.     Dental Health  It is important to care for permanent teeth by brushing at least twice a day with a fluoridated toothpaste,  flossing daily and seeing a dentist regularly. Limiting carbonated sodas and other sweet beverages is also important to prevent tooth decay. Gum chewers should choose sugarless gum. Youth who are active in contact sports should wear a mouth guard. Let us know if your water supply comes from a well; fluoride supplements may be indicated.    Body Image and Healthy Lifestyle  Youth at this age can be very sensitive to how they look compared to others. The media, unfortunately, frequently presents unhealthy, unrealistic body images to youth. It's not news that a healthy body size is obtained by eating healthy foods and being physically active every day and limiting unhealthy habits like junk food and too much time spent just sitting. Encourage healthy habits in your child and remember to provide praise about all of their good aspects, not just how they look.    Make your child a partner in healthy lifestyle choices by having them help with meal planning, shopping, and food preparation at this age. Do your best to eat meals as a family as often as you can,  despite busy schedules. (And remember to keep the TV off and phones away from the table - this is one of the best times for a good family face-to-face communication.) Keep healthy choices in the home for in between meals snacks and do NOT keep a lot of junk food and unhealthy snacks in your home - if they are there, your children will eat them! Encourage lots of water to drink and avoid keeping soda and other sweet beverages in your home on a regular basis. Calcium and vitamin D remain very important for optimal bone health at this age. Your child needs 3 to 4 servings of a good source of calcium daily (low-fat or skim milk, yogurt, cheese, calcium-fortified orange juice or other foods). 600 IU of vitamin D daily is recommended. Most people cannot meet their vitamin D needs with their usual diet, so a multivitamin with iron supplement is a good way to get it. (A pure vitamin D supplement with 400 or 600 IU is also okay.)    Finding time to be physically active every day is a challenge with increasing school work and other activities. Encourage at least 60 minutes of physical activity each day. It does not have to be all at the same time. Physical education classes can count for some of it, but other activities, such as biking, dancing, and simply playing with the kids in the neighborhood are usually needed to get to the goal of 60 minutes a day. When participating in sports, make sure appropriate safety equipment is used (helmet, mouth  guard, eye protection, wrist guards, elbow and knee pads, athletic supporter). Limiting how much screen time or media use your child has is often necessary to make sure they have time for the recommended physical activity and exercise.    Sleep   Adequate sleep is important to all aspects of your child's health. Using digital devices before bedtime can interfere with quality sleep. Keeping electronic devices out of your child's room at night is a great step towards ensuring adequate  sleep and rest. If you don't already have set rules about electronic media use, check out the American Academy of Pediatrics [email or web address] website (search for “media use plan”).    Safety On the Land and In the Water   Seatbelts do not fit properly until a child is taller than 4'9\" and weighs more than 80 pounds. Smaller kids in this age group won’t like riding in a booster seat but that is where they are safest. High-backed booster seats should be used if there are low seat backs or no head rests in your car; backless boosters can be used if your car has high seat backs and head rests. Children are safest in the back seat until they are 13 years old. Talk to your child about never getting into a car with someone who is under the influence of drugs or alcohol; let them know that they can always call you for help if this situation ever occurs.     Helmets and other protective gear should always be worn when biking, skating or skateboarding; they may be recommended for additional sports (kayaking, water polo, etc) for older children.  All-terrain vehicles (ATVs) are very dangerous and adolescents under 16 years of age should not be allowed to ride them. When on a boat or engaging in water sports, a [employer] approved lifejacket should always be worn.    Sun and Gun safety  Most teenagers love the sun, but the ultraviolet radiation of the sun causes skin damage (premature aging) and increases the risk of skin cancer. The same sun protection recommendations apply to all ages: do not spend time the sun without using sunscreen with SPF of 15 or higher, avoid prolonged exposure between 11 and 3 PM when the sun intensity is the highest, and wear sunglasses and other sun protective clothing. Use of tanning beds should not be permitted for adolescents.    If you have an adolescent with a history of depression, suicide attempts, or aggressive behaviors, it is very dangerous to keep a firearm in your home. If  firearms are stored in your home, be sure they are stored unloaded and locked with ammunition locked separately and be sure that children do not have access to the keys.    MEDICATION FOR FEVER OR PAIN:   Acetaminophen liquid (e.g., Tylenol or Tempra) may be given every four hours as needed for pain or fever. Acetaminophen liquid is less concentrated than the infant dropper bottle type. Be sure to check which product CONCENTRATION you are using.    CHILDREN’S Tylenol/Acetaminophen  (160 MG/5 mL)    Child’s Weight:  Dose:  36 - 47 pounds:    240 mg (7.5 mL (1 1/2 Teaspoons))  48 - 59 pounds:    320 mg (10.0 mL (2 Teaspoons))  60 - 71 pounds:    400 mg (12.5 mL (2 1/2 Teaspoons))  72 - 95 pounds:    480 mg (15.0 mL (3 Teaspoons))  Greater than 96 pounds:   640 mg (20.0 mL (4 Teaspoons))    CHILDREN’S Tylenol/Acetaminophen MELTAWAYS ( 80 MG tablets)    Child’s Weight:  Dose:  36 - 47 pounds:    240 mg (3 meltaway tablets)  48 - 59 pounds:    320 mg (4 meltaway tablets)  60 - 71 pounds:    400 mg (5 meltaway tablets)  72 - 95 pounds:    480 mg (6 meltaway tablets)  Greater than 96 pounds:   640 mg (8 meltaway tablets)    Pavan (Jr) Tylenol/Acetaminophen MELTAWAYS (160 MG tablets)    Child’s Weight:  Dose:  36 - 47 pounds:    240 mg (1 1/2 meltaway tablets)  48 - 59 pounds:    320 mg (2 meltaway tablets)  60 - 71 pounds:    400 mg (2 1/2 meltaway tablets)  72 - 95 pounds:    480 mg (3 meltaway tablets)  Greater than 96 pounds:   640 mg (4 meltaway tablets)    CHILDREN'S Ibuprofen (e.g., Advil or Motrin) may be given every six hours as needed for pain or fever.    48 - 59 pounds:    200 mg (2 Teaspoons)  60 - 71 pounds:    250 mg (2 1/2 Teaspoons)  72 - 95 pounds:    300 mg (3 Teaspoons)        NEXT VISIT:  IN 1 YEAR    Thank you for entrusting your care to [facility].    Also, check out “Children’s Health” on the [facility] Blog for updates on timely topics regarding children’s health!

## 2024-09-09 NOTE — H&P
How Severe Is Your Skin Lesion?: mild [facility] Medicine  History & Physical    Patient Name: Chato Bowie  MRN: [id number]  Patient Class: OP- Observation  Admission Date: 9/23/2023  Attending Physician: Jose L Evans III, MD   Primary Care Provider: Irlanda Valenzuela -         Patient information was obtained from patient and ER records.     Subjective:     Principal Problem:Sepsis    Chief Complaint:   Chief Complaint   Patient presents with    Abnormal Lab     Pt to ED via EMS from [facility] for abnormal lab. EMS reports LPN at [facility] wouldn't tell him what the abnormal lab was. Pt only c/o L knee pain. Pt was here 09/19/23 and discharged yesterday.         HPI: 72M with pmh of hfref, CAD, DM, HTN, HLD, a fib who presents back due to fever and leukocytosis. Pt discharge order was placed the previous day 9/22, but did not leave until 5:15 on 9/23 prior to shift change to go back to his nursing facility. On review of the pts overnight vitals and labs pt noted to have a fever and new leukocytosis. I canceled pts discharge and alerted nurse of change of plan, but was informed pt had already left the facility. I called pts nursing home ([facility]) and spoke to a nurse. I informed them of his white count and fever overnight and advised that pt should be brought back to the emergency room for re-evaluation if there was not a provider in house. Nursing staff endorsed understanding and informed me they would start the process to have pt re-evaluated. Currently pt states he is feeling ok, but does endorse burning with urination. Denies hematuria, abd pain, n/v/d, cp, ha, blurry vision, sob, cough at this time.      Past Medical History:   Diagnosis Date    Acute congestive heart failure 3/20/2020    Alcohol abuse     Arthritis     Asthma attack 11/24/2021    Coronary artery disease     Diabetes mellitus     Hyperlipemia     Hypertension     Multiple thyroid nodules 6/14/2023    Rhabdomyolysis         Past Surgical History:   Procedure Laterality Date    ECHOCARDIOGRAM,TRANSESOPHAGEAL N/A 9/21/2023    Procedure: Transesophageal echo (MARIA) intra-procedure log documentation;  Surgeon: Luisana Rodríguez MD;  Location: [facility] CATH LAB;  Service: Cardiology;  Laterality: N/A;    EYE SURGERY      TRANSESOPHAGEAL ECHOCARDIOGRAM WITH POSSIBLE CARDIOVERSION (MARIA W/ POSS CARDIOVERSION) N/A 1/5/2023    Procedure: Transesophageal echo (MARIA) intra-procedure log documentation;  Surgeon: Indra Foote MD;  Location: [facility] CATH LAB;  Service: Cardiology;  Laterality: N/A;    TRANSESOPHAGEAL ECHOCARDIOGRAPHY N/A 9/21/2023    Procedure: ECHOCARDIOGRAM, TRANSESOPHAGEAL;  Surgeon: Luisana Rodríguez MD;  Location: [facility] CATH LAB;  Service: Cardiology;  Laterality: N/A;    TREATMENT OF CARDIAC ARRHYTHMIA N/A 12/7/2020    Procedure: Cardioversion or Defibrillation;  Surgeon: Indra Foote MD;  Location: [facility] CATH LAB;  Service: Cardiology;  Laterality: N/A;    TREATMENT OF CARDIAC ARRHYTHMIA N/A 12/30/2020    Procedure: Cardioversion or Defibrillation;  Surgeon: Tyrone Steen MD;  Location: [facility] CATH LAB;  Service: Cardiology;  Laterality: N/A;    TREATMENT OF CARDIAC ARRHYTHMIA N/A 1/5/2023    Procedure: Cardioversion or Defibrillation;  Surgeon: Indra Foote MD;  Location: [facility] CATH LAB;  Service: Cardiology;  Laterality: N/A;    VASCULAR SURGERY         Review of patient's allergies indicates:  No Known Allergies    Current Facility-Administered Medications on File Prior to Encounter   Medication    [COMPLETED] influenza 65up-adj (QUADRIVALENT ADJUVANTED PF) vaccine 0.5 mL    [DISCONTINUED] 0.9%  NaCl infusion (for blood administration)    [DISCONTINUED] acetaminophen tablet 650 mg    [DISCONTINUED] albuterol sulfate nebulizer solution 2.5 mg    [DISCONTINUED] allopurinoL tablet 100 mg    [DISCONTINUED] aluminum-magnesium hydroxide-simethicone 200-200-20 mg/5 mL suspension 30 mL    [DISCONTINUED]  Is This A New Presentation, Or A Follow-Up?: Skin Lesion amiodarone tablet 200 mg    [DISCONTINUED] apixaban tablet 5 mg    [DISCONTINUED] atorvastatin tablet 80 mg    [DISCONTINUED] dextrose 50% injection 12.5 g    [DISCONTINUED] dextrose 50% injection 25 g    [DISCONTINUED] ferrous sulfate tablet 1 each    [DISCONTINUED] folic acid tablet 1 mg    [DISCONTINUED] furosemide injection 40 mg    [DISCONTINUED] gabapentin capsule 100 mg    [DISCONTINUED] glucagon (human recombinant) injection 1 mg    [DISCONTINUED] glucose chewable tablet 16 g    [DISCONTINUED] glucose chewable tablet 24 g    [DISCONTINUED] HYDROcodone-acetaminophen 5-325 mg per tablet 1 tablet    [DISCONTINUED] insulin aspart U-100 pen 0-5 Units    [DISCONTINUED] insulin detemir U-100 (Levemir) pen 5 Units    [DISCONTINUED] LIDOcaine 5 % patch 1 patch    [DISCONTINUED] melatonin tablet 6 mg    [DISCONTINUED] metoprolol succinate (TOPROL-XL) 24 hr tablet 25 mg    [DISCONTINUED] multivitamin tablet    [DISCONTINUED] naloxone 0.4 mg/mL injection 0.02 mg    [DISCONTINUED] ondansetron injection 4 mg    [DISCONTINUED] potassium chloride SA CR tablet 40 mEq    [DISCONTINUED] sodium chloride 0.9% flush 10 mL    [DISCONTINUED] sodium chloride 0.9% flush 10 mL     Current Outpatient Medications on File Prior to Encounter   Medication Sig    albuterol (PROVENTIL/VENTOLIN HFA) 90 mcg/actuation inhaler Inhale 2 puffs into the lungs every 6 (six) hours as needed for Wheezing or Shortness of Breath.    allopurinoL (ZYLOPRIM) 100 MG tablet Take 1 tablet (100 mg total) by mouth once daily.    amiodarone (PACERONE) 200 MG Tab Take 200 mg by mouth once daily.    ELIQUIS 5 mg Tab Take 5 mg by mouth 2 (two) times daily.    folic acid (FOLVITE) 1 MG tablet Take 1 tablet (1 mg total) by mouth once daily.    furosemide (LASIX) 80 MG tablet Take 1 tablet (80 mg total) by mouth 2 (two) times daily.    gabapentin (NEURONTIN) 100 MG capsule Take 1 capsule (100 mg total) by mouth 2 (two) times daily.     "hydrALAZINE (APRESOLINE) 50 MG tablet Take by mouth.    HYDROcodone-acetaminophen (NORCO) 5-325 mg per tablet Take 1 tablet by mouth every 8 (eight) hours as needed for Pain.    insulin detemir U-100, Levemir, 100 unit/mL (3 mL) SubQ InPn pen Inject 7 Units into the skin 2 (two) times daily.    LIDOcaine (LIDODERM) 5 % Place 2 patches onto the skin once daily. Remove & Discard patch within 12 hours or as directed by MD    losartan-hydrochlorothiazide 50-12.5 mg (HYZAAR) 50-12.5 mg per tablet Take 1 tablet by mouth.    metOLazone (ZAROXOLYN) 5 MG tablet Take by mouth.    metoprolol succinate (TOPROL-XL) 25 MG 24 hr tablet Take 25 mg by mouth once daily.    potassium chloride (KLOR-CON) 10 MEQ TbSR Take 10 mEq by mouth once daily.    rosuvastatin (CRESTOR) 40 MG Tab Take 40 mg by mouth every evening.    tamsulosin (FLOMAX) 0.4 mg Cap Take 1 capsule (0.4 mg total) by mouth once daily.    ascorbic acid, vitamin C, (VITAMIN C) 500 MG tablet Take 500 mg by mouth 2 (two) times daily.    BD AUTOSHIELD DUO PEN NEEDLE 30 gauge x 3/16" Ndle     CALAMINE PLUS, PRAMOX-CALAMIN, 1-8 % SprA SMARTSI liberally Topical 4 Times Daily PRN    ferrous sulfate (FEOSOL) 325 mg (65 mg iron) Tab tablet Take 325 mg by mouth once daily.    multivitamin (ONE DAILY MULTIVITAMIN) per tablet Take 1 tablet by mouth once daily.    VASHE WOUND THERAPY 0.033 % IrSl     [DISCONTINUED] benzonatate (TESSALON) 100 MG capsule Take 100 mg by mouth 3 (three) times daily as needed for Cough.    [DISCONTINUED] hydrALAZINE (APRESOLINE) 100 MG tablet Take 0.5 tablets (50 mg total) by mouth every 8 (eight) hours.     Family History       Problem Relation (Age of Onset)    Diabetes Mother, Father, Sister    Hypertension Mother, Father, Sister          Tobacco Use    Smoking status: Never    Smokeless tobacco: Never   Substance and Sexual Activity    Alcohol use: Not Currently     Alcohol/week: 6.0 standard drinks of alcohol     Types: 6 Cans " of beer per week     Comment: daily    Drug use: No    Sexual activity: Yes     Partners: Female     Review of Systems  Objective:     Vital Signs (Most Recent):  Temp: 98.5 °F (36.9 °C) (09/23/23 1330)  Pulse: 66 (09/23/23 1330)  Resp: 14 (09/23/23 1330)  BP: (!) 144/67 (09/23/23 1330)  SpO2: (!) 92 % (09/23/23 1330) Vital Signs (24h Range):  Temp:  [97.9 °F (36.6 °C)-101.1 °F (38.4 °C)] 98.5 °F (36.9 °C)  Pulse:  [59-67] 66  Resp:  [14-24] 14  SpO2:  [92 %-100 %] 92 %  BP: (109-146)/(55-67) 144/67     Weight: 95.3 kg (210 lb)  Body mass index is 29.29 kg/m².     Physical Exam  Vitals reviewed.   Constitutional:       General: He is not in acute distress.     Appearance: He is not toxic-appearing.   HENT:      Head: Normocephalic and atraumatic.      Mouth/Throat:      Mouth: Mucous membranes are moist.      Pharynx: Oropharynx is clear.   Eyes:      General: No scleral icterus.     Extraocular Movements: Extraocular movements intact.   Cardiovascular:      Rate and Rhythm: Normal rate and regular rhythm.   Pulmonary:      Effort: Pulmonary effort is normal. No respiratory distress (no o2. lying back in bed comfortably).   Abdominal:      Palpations: Abdomen is soft.      Tenderness: There is no abdominal tenderness. There is no guarding or rebound.   Musculoskeletal:         General: Swelling (bilateral pitting edema similar to previous day) present.      Cervical back: Neck supple. No rigidity.   Skin:     General: Skin is warm and dry.   Neurological:      General: No focal deficit present.      Mental Status: He is alert and oriented to person, place, and time.   Psychiatric:         Mood and Affect: Mood normal.         Behavior: Behavior normal.                Significant Labs: All pertinent labs within the past 24 hours have been reviewed.    Significant Imaging: I have reviewed all pertinent imaging results/findings within the past 24 hours.    Assessment/Plan:     * Sepsis  This patient does have  evidence of infective focus  My overall impression is sepsis.  Source: Respiratory and Urinary Tract  Antibiotics given-   Antibiotics (72h ago, onward)    Start     Stop Route Frequency Ordered    09/24/23 0000  cefTRIAXone (ROCEPHIN) 2 g in dextrose 5 % in water (D5W) 100 mL IVPB (MB+)         -- IV Every 24 hours (non-standard times) 09/23/23 1153    09/23/23 1300  doxycycline tablet 100 mg         -- Oral Every 12 hours 09/23/23 1153    09/23/23 1230  cefTRIAXone (ROCEPHIN) 1 g in dextrose 5 % in water (D5W) 100 mL IVPB (MB+)         -- IV Once 09/23/23 1153        Latest lactate reviewed-  Recent Labs   Lab 09/23/23  0930   LACTATE 0.9     Organ dysfunction indicated by Acute kidney injury    Fluid challenge Contraindicated- Fluid bolus is contraindicated in this patient due to Congestive Heart Failure     Post- resuscitation assessment No - Post resuscitation assessment not needed       Will Not start Pressors- Levophed for MAP of 65  Source control achieved by: Pt presenting with fever and leukocytosis with concerns for possible respiratory vs urinary infection.    -ceftriaxone/doxy for abx coverage  -urine and sputum cultures pending  -bc ordered    ACP (advance care planning)    Advance Care Planning     Date: 09/23/2023  Pt would like to be full code 2 minutes discussing acp. Palliative care followed pt.        UTI (urinary tract infection)  tx as stated above    CKD (chronic kidney disease), stage IV  Pt with stable renal function.     -continue to monitor and renally dose medications  -outpatient nephrology f/u    Paroxysmal atrial flutter  Pt with h/o a fib on eliquis and metoprolol at home. Currently rate controlled    -continue eliquis and metoprolol    BPH (benign prostatic hyperplasia)  Continue home medications      Hypokalemia  Improved. Continue to replete prn as pt on lasix      Anemia  Stable    -continue to monitor    Type 2 diabetes mellitus with kidney complication, with long-term current  use of insulin  Patient's FSGs are controlled on current medication regimen.  Last A1c reviewed-   Lab Results   Component Value Date    HGBA1C 5.5 09/20/2023     Most recent fingerstick glucose reviewed-   Recent Labs   Lab 09/22/23  1622 09/22/23  1945   POCTGLUCOSE 168* 177*     Current correctional scale  Low   anti-hyperglycemic dose as follows-   Antihyperglycemics (From admission, onward)    Start     Stop Route Frequency Ordered    09/23/23 1251  insulin aspart U-100 pen 0-5 Units         -- SubQ Before meals & nightly PRN 09/23/23 1152        Hold Oral hypoglycemics while patient is in the hospital.      VTE Risk Mitigation (From admission, onward)         Ordered     apixaban tablet 5 mg  2 times daily         09/23/23 1152     Reason for No Pharmacological VTE Prophylaxis  Once        Question:  Reasons:  Answer:  Already adequately anticoagulated on oral Anticoagulants    09/23/23 1152     IP VTE HIGH RISK PATIENT  Once         09/23/23 1152     Place sequential compression device  Until discontinued         09/23/23 1152                   On 09/23/2023, patient should be placed in hospital observation services under my care.        Jose L Evans III, MD  Department of Hospital Medicine  [facility] - Telemetry

## 2024-10-15 NOTE — PT/OT/SLP PROGRESS
Called Dr Del Real at office and left message to call the ER   Physical Therapy      Patient Name:  Chato Bowie   MRN:  [id number]    Patient not seen today secondary to Testing/imaging (xray/CT/MRI). Will follow-up .

## (undated) DEVICE — SEE MEDLINE ITEM 154981

## (undated) DEVICE — NDL 11GA X 6 JAMSHIDI

## (undated) DEVICE — OMNIPAQUE 350MG 150ML VIAL

## (undated) DEVICE — BLADE SURG STAINLESS STEEL #15

## (undated) DEVICE — VISIPAQUE CONTRAST 320MG/100ML

## (undated) DEVICE — Device

## (undated) DEVICE — SPONGE COTTON TRAY 4X4IN

## (undated) DEVICE — KIT SYR REUSABLE

## (undated) DEVICE — NDL 27G X 1 1/4

## (undated) DEVICE — INTRODUCER CATH 6F 11CM

## (undated) DEVICE — PACK ARTHROSCOPY W/ISO BAC

## (undated) DEVICE — DILATOR VESSEL 16FR 20CM

## (undated) DEVICE — STOCKINET 4INX48

## (undated) DEVICE — TOWEL OR DISP STRL BLUE 4/PK

## (undated) DEVICE — DILATOR COONS TAPER 14FR

## (undated) DEVICE — PACK EP DRAPE OMC

## (undated) DEVICE — KIT HAND CONTROL HIGH PRESSUR

## (undated) DEVICE — PAD ABD 8X10 STERILE

## (undated) DEVICE — SOL VASHE INSTILLATION WND 16

## (undated) DEVICE — COVER OVERHEAD SURG LT BLUE

## (undated) DEVICE — GLOVE SURGICAL LATEX SZ 6.5

## (undated) DEVICE — DILATOR VESSEL COONS 18FR 20CM

## (undated) DEVICE — DRESSING GAUZE OIL EMUL 3X8

## (undated) DEVICE — BANDAGE ROLL COTTN 4.5INX4.1YD

## (undated) DEVICE — ELECTRODE PED RESUS ONESTEP

## (undated) DEVICE — KIT MANIFOLD LOW PRESS TUBING

## (undated) DEVICE — KIT PROBE COVER WITH GEL

## (undated) DEVICE — BLANKET UPPER BODY 78.7X29.9IN

## (undated) DEVICE — KIT ARROW OBTURATOR .50PSI

## (undated) DEVICE — TRAY SUT REM SCISSOR FORCEP

## (undated) DEVICE — COLLECTOR SPECIMEN ANAEROBIC

## (undated) DEVICE — PAD ABDOMINAL STERILE 8X10IN

## (undated) DEVICE — ELECTRODE REM PLYHSV RETURN 9

## (undated) DEVICE — SYR 10CC LUER LOCK

## (undated) DEVICE — SHEATH HEMOSTASIS 8.5FR

## (undated) DEVICE — CUP MEDICINE GRAD STRL 2OZ

## (undated) DEVICE — PACK CATH LAB

## (undated) DEVICE — PULSAVAC ZIMMER

## (undated) DEVICE — SWAB CULTURETTE II DUAL

## (undated) DEVICE — INTRO SHEATH CATH 23FRX55.7CM

## (undated) DEVICE — NDL 18GA X1 1/2 REG BEVEL

## (undated) DEVICE — PAD CAST SPECIALIST STRL 4

## (undated) DEVICE — SOL NACL IRR 3000ML

## (undated) DEVICE — PAD DEFIB CADENCE ADULT R2

## (undated) DEVICE — SPONGE GAUZE 16PLY 4X4

## (undated) DEVICE — CATH DXTERITY JL40 100CM 6FR

## (undated) DEVICE — GAUZE SPONGE 4X4 12PLY

## (undated) DEVICE — GUIDEWIRE SAFE T J TIP 145X2.5

## (undated) DEVICE — GUIDE WIRE AMPLATZ .035X1 MDTH

## (undated) DEVICE — PAD PREP 50/CA

## (undated) DEVICE — TOURNIQUET SB QC DP 18X4IN

## (undated) DEVICE — CATH DXTERITY PIGSTR 110CM 6FR

## (undated) DEVICE — CATH DXTERITY NOTO 100CM 6FR

## (undated) DEVICE — CANISTER SUCTION 2 LTR